# Patient Record
Sex: MALE | Race: WHITE | NOT HISPANIC OR LATINO | Employment: OTHER | URBAN - METROPOLITAN AREA
[De-identification: names, ages, dates, MRNs, and addresses within clinical notes are randomized per-mention and may not be internally consistent; named-entity substitution may affect disease eponyms.]

---

## 2017-01-02 ENCOUNTER — HOSPITAL ENCOUNTER (OUTPATIENT)
Dept: CARDIAC REHAB | Facility: CLINIC | Age: 67
End: 2017-01-02
Attending: INTERNAL MEDICINE
Payer: MEDICARE

## 2017-01-02 PROCEDURE — 93798 PHYS/QHP OP CAR RHAB W/ECG: CPT

## 2017-01-02 PROCEDURE — 40000116 ZZH STATISTIC OP CR VISIT

## 2017-01-04 ENCOUNTER — HOSPITAL ENCOUNTER (OUTPATIENT)
Dept: CARDIAC REHAB | Facility: CLINIC | Age: 67
End: 2017-01-04
Attending: INTERNAL MEDICINE
Payer: MEDICARE

## 2017-01-04 PROCEDURE — 40000116 ZZH STATISTIC OP CR VISIT

## 2017-01-04 PROCEDURE — 93798 PHYS/QHP OP CAR RHAB W/ECG: CPT

## 2017-01-05 ENCOUNTER — OFFICE VISIT (OUTPATIENT)
Dept: FAMILY MEDICINE | Facility: CLINIC | Age: 67
End: 2017-01-05
Payer: MEDICARE

## 2017-01-05 VITALS
RESPIRATION RATE: 22 BRPM | HEIGHT: 68 IN | HEART RATE: 74 BPM | BODY MASS INDEX: 33.04 KG/M2 | OXYGEN SATURATION: 94 % | TEMPERATURE: 95.1 F | DIASTOLIC BLOOD PRESSURE: 88 MMHG | WEIGHT: 218 LBS | SYSTOLIC BLOOD PRESSURE: 128 MMHG

## 2017-01-05 DIAGNOSIS — Z95.5 STATUS POST INSERTION OF DRUG-ELUTING STENT INTO LEFT ANTERIOR DESCENDING ARTERY FOR CORONARY ARTERY DISEASE: Primary | ICD-10-CM

## 2017-01-05 DIAGNOSIS — I25.110 ATHEROSCLEROSIS OF NATIVE CORONARY ARTERY OF NATIVE HEART WITH UNSTABLE ANGINA PECTORIS (H): ICD-10-CM

## 2017-01-05 DIAGNOSIS — M12.9 ARTHROPATHY: ICD-10-CM

## 2017-01-05 DIAGNOSIS — F41.9 ANXIETY: ICD-10-CM

## 2017-01-05 PROCEDURE — 99495 TRANSJ CARE MGMT MOD F2F 14D: CPT | Performed by: FAMILY MEDICINE

## 2017-01-05 RX ORDER — NITROGLYCERIN 0.4 MG/1
TABLET SUBLINGUAL
Qty: 25 TABLET | Refills: 0 | Status: SHIPPED | OUTPATIENT
Start: 2017-01-05 | End: 2020-09-30

## 2017-01-05 RX ORDER — ALPRAZOLAM 0.5 MG
0.5 TABLET ORAL 3 TIMES DAILY PRN
Qty: 90 TABLET | Refills: 1 | Status: SHIPPED | OUTPATIENT
Start: 2017-01-05 | End: 2017-03-03

## 2017-01-05 RX ORDER — HYDROCODONE BITARTRATE AND ACETAMINOPHEN 7.5; 325 MG/1; MG/1
1 TABLET ORAL
Qty: 180 TABLET | Refills: 0 | Status: SHIPPED | OUTPATIENT
Start: 2017-01-05 | End: 2017-02-03

## 2017-01-05 RX ORDER — CITALOPRAM HYDROBROMIDE 40 MG/1
40 TABLET ORAL DAILY
Qty: 30 TABLET | Refills: 6 | Status: SHIPPED | OUTPATIENT
Start: 2017-01-05 | End: 2017-08-04

## 2017-01-05 NOTE — PROGRESS NOTES
SUBJECTIVE:                                                    Quan Murphy is a 66 year old male who presents to clinic today for the following health issues:          Hospital Follow-up Visit:    Hospital/Nursing Home/IP Rehab Facility: [facility]  Date of Admission: 12/24/17  Date of Discharge: 12/28/17  Reason(s) for Admission: Unstable angina, Essential hypertension, Chronic diastolic CHF, Chronic pain syndrome, Major recurrent depressive disorder with anxiety            Problems taking medications regularly:  None       Medication changes since discharge: Plavix       Problems adhering to non-medication therapy:  None    Summary of hospitalization:  [facility] discharge summary reviewed  Diagnostic Tests/Treatments reviewed.  Follow up needed: Cardiac rehabilitation and cardiology.  Other Healthcare Providers Involved in Patient s Care:         Specialist appointment - Cardiology  Update since discharge: improved.     Post Discharge Medication Reconciliation: discharge medications reconciled and changed, per note/orders (see AVS).  Plan of care communicated with patient     Coding guidelines for this visit:  Type of Medical   Decision Making Face-to-Face Visit       within 7 Days of discharge Face-to-Face Visit        within 14 days of discharge   Moderate Complexity [id number]   High Complexity [id number]                Problem list and histories reviewed & adjusted, as indicated.  Additional history: as documented    SUBJECTIVE:  Quan  is a 66 year old male who presents for:  Follow-up of his hospitalization for coronary artery stenting. And was found to have unstable angina. Significant occlusion of the left anterior descending artery and a drug-eluting stent was placed in. He has been feeling well. Slight ecchymosis at the site of the catheterization but otherwise well. He has multiple other medical issues including chronic pain chronic sinusitis and anxiety and insomnia.  These all need addressing today as well.    Past Medical History   Diagnosis Date     Allergy, unspecified not elsewhere classified      Seasonal allergies, pollen, dust, smoke and animals     Malignant neoplasm of prostate (H)      Prostate cancer     Coronary atherosclerosis of unspecified type of vessel, native or graft      Coronary artery disease     Past Surgical History   Procedure Laterality Date     C laparoscopy, surgical prostatectomy, retropubic radical, w/nerve sparing  11/30/2004     With full bilateral pelvic lymphadenectomy.  F-[facility].     Hc correct bunion,simple  08/11/2005     x3     Hc remv toe benign bone lesn  08/11/2005     Surgical history of -   1999/1974     lt knee     Surgical history of -   10/2004     lithotripsy     Surgical history of - 11/05     angiogram with stent     C total knee arthroplasty  05/01/08     Left knee     Mohs micrographic procedure  08/23/11     ear and chin-[facility]     Herniorrhaphy inguinal  7/3/2013     Procedure: HERNIORRHAPHY INGUINAL;  Open Repair Inguinal hernia Right with mesh ;  Surgeon: Sam Escobar MD;  Location: [facility]     Arthrodesis foot  7/23/2013     Procedure: ARTHRODESIS FOOT;  Great Toe Arthrodesis Left Foot;  Surgeon: Ash Gonzalez DPM;  Location: [facility] OR     Colonoscopy  10/7/2013     Procedure: COLONOSCOPY;  Colonoscopy;  Surgeon: Mike Fallon MD;  Location: [facility] GI     Arthrodesis foot  6/10/2014     Procedure: ARTHRODESIS FOOT;  Surgeon: Ash Gonzalez DPM;  Location: [facility] OR     Reconstruct forefoot with metatarsophalangeal (mtp) fusion  6/10/2014     Procedure: RECONSTRUCT FOREFOOT WITH METATARSOPHALANGEAL (MTP) FUSION;  Surgeon: Ash Gonzalez DPM;  Location: [facility] OR     Stent, coronary, cesilia       Social History   Substance Use Topics     Smoking status: Never Smoker      Smokeless tobacco: Never Used     Alcohol Use: 5.0 oz/week     10 Cans of beer per week      Comment: occasional      Current Outpatient  Prescriptions   Medication Sig Dispense Refill     nitroglycerin (NITROSTAT) 0.4 MG sublingual tablet For chest pain place 1 tablet under the tongue every 5 minutes for 3 doses. If symptoms persist 5 minutes after 1st dose call 911. 25 tablet 0     HYDROcodone-acetaminophen (NORCO) 7.5-325 MG per tablet Take 1 tablet by mouth 6 times daily 180 tablet 0     ALPRAZolam (XANAX) 0.5 MG tablet Take 1 tablet (0.5 mg) by mouth 3 times daily as needed for anxiety 90 tablet 1     citalopram (CELEXA) 40 MG tablet Take 1 tablet (40 mg) by mouth daily 30 tablet 6     aspirin EC 81 MG EC tablet Take 1 tablet (81 mg) by mouth daily 30 tablet 1     rosuvastatin (CRESTOR) 40 MG tablet Take 1 tablet (40 mg) by mouth daily 30 tablet 1     clopidogrel (PLAVIX) 75 MG tablet Take 1 tablet (75 mg) by mouth daily 30 tablet 1     zolpidem (AMBIEN) 10 MG tablet Take 1 tablet (10 mg) by mouth nightly as needed for sleep 30 tablet 5     lisinopril (PRINIVIL,ZESTRIL) 10 MG tablet TAKE ONE TABLET BY MOUTH EVERY DAY 30 tablet 6     metoprolol (LOPRESSOR) 25 MG tablet Take 1 tablet (25 mg) by mouth 2 times daily 180 tablet 3     hydrOXYzine (VISTARIL) 50 MG capsule Take 1 capsule (50 mg) by mouth 4 times daily as needed for itching 30 capsule 3     cetirizine (ZYRTEC) 10 MG tablet Take 10 mg by mouth daily       fluorouracil (EFUDEX) 5 % cream Apply topically daily as needed        CIALIS 20 MG tablet Take 20 mg by mouth daily as needed        tretinoin (RETIN-A) 0.1 % cream Apply topically daily as needed        nitroglycerin (NITROSTAT) 0.4 MG SL tablet Place 1 tablet under the tongue every 5 minutes as needed for chest pain. 25 tablet 0     Acetaminophen (TYLENOL PO) Take 500 mg by mouth daily as needed        ORDER FOR DME Nebulizer machine 1 Units 0       REVIEW OF SYSTEMS:   5 point ROS negative except as noted above in HPI, including Gen., Resp, CV, GI &  system review.     OBJECTIVE:  Vitals: /88 mmHg  Pulse 74  Temp(Src) 95.1  " F (35.1  C) (Temporal)  Resp 22  Ht 5' 8\" (1.727 m)  Wt 218 lb (98.884 kg)  BMI 33.15 kg/m2  SpO2 94%  BMI= Body mass index is 33.15 kg/(m^2).  Appears in no distress. Ears nose and throat are clear. Lungs are clear to auscultation. Heart regular rhythm S1-S2 no murmur. Skin clear. Extremities normal.    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 He is doing well on his Plavix no bruising no problems. He was put on Crestor and is doing well with this. Renewed his Vicodin and his Xanax and citalopram. Also refreshed his sublingual nitroglycerin tablets. Follow up with him as needed.        Jose Hernandez MD  [facility]                    "

## 2017-01-05 NOTE — NURSING NOTE
"Chief Complaint   Patient presents with     Hospital F/U       Initial /88 mmHg  Pulse 74  Temp(Src) 95.1  F (35.1  C) (Temporal)  Resp 22  Ht 5' 8\" (1.727 m)  Wt 218 lb (98.884 kg)  BMI 33.15 kg/m2  SpO2 94% Estimated body mass index is 33.15 kg/(m^2) as calculated from the following:    Height as of this encounter: 5' 8\" (1.727 m).    Weight as of this encounter: 218 lb (98.884 kg).  BP completed using cuff size: regular    "

## 2017-01-06 ENCOUNTER — HOSPITAL ENCOUNTER (OUTPATIENT)
Dept: CARDIAC REHAB | Facility: CLINIC | Age: 67
End: 2017-01-06
Attending: INTERNAL MEDICINE
Payer: MEDICARE

## 2017-01-06 PROCEDURE — 40000116 ZZH STATISTIC OP CR VISIT

## 2017-01-06 PROCEDURE — 93798 PHYS/QHP OP CAR RHAB W/ECG: CPT

## 2017-01-09 ENCOUNTER — HOSPITAL ENCOUNTER (OUTPATIENT)
Dept: CARDIAC REHAB | Facility: CLINIC | Age: 67
End: 2017-01-09
Attending: INTERNAL MEDICINE
Payer: MEDICARE

## 2017-01-09 PROCEDURE — 93798 PHYS/QHP OP CAR RHAB W/ECG: CPT | Performed by: REHABILITATION PRACTITIONER

## 2017-01-09 PROCEDURE — 40000116 ZZH STATISTIC OP CR VISIT: Performed by: REHABILITATION PRACTITIONER

## 2017-01-10 ENCOUNTER — OFFICE VISIT (OUTPATIENT)
Dept: CARDIOLOGY | Facility: CLINIC | Age: 67
End: 2017-01-10
Payer: MEDICARE

## 2017-01-10 VITALS
DIASTOLIC BLOOD PRESSURE: 72 MMHG | OXYGEN SATURATION: 97 % | HEIGHT: 68 IN | SYSTOLIC BLOOD PRESSURE: 116 MMHG | RESPIRATION RATE: 12 BRPM | WEIGHT: 220.63 LBS | HEART RATE: 60 BPM | BODY MASS INDEX: 33.44 KG/M2

## 2017-01-10 DIAGNOSIS — I25.10 ATHEROSCLEROSIS OF NATIVE CORONARY ARTERY OF NATIVE HEART WITHOUT ANGINA PECTORIS: ICD-10-CM

## 2017-01-10 DIAGNOSIS — I10 HYPERTENSION GOAL BP (BLOOD PRESSURE) < 140/90: ICD-10-CM

## 2017-01-10 DIAGNOSIS — I25.750 CORONARY ARTERY DISEASE INVOLVING NATIVE ARTERY OF TRANSPLANTED HEART WITH UNSTABLE ANGINA PECTORIS (H): Primary | ICD-10-CM

## 2017-01-10 DIAGNOSIS — E78.5 HYPERLIPIDEMIA LDL GOAL <130: ICD-10-CM

## 2017-01-10 PROCEDURE — 99213 OFFICE O/P EST LOW 20 MIN: CPT | Performed by: NURSE PRACTITIONER

## 2017-01-10 RX ORDER — ROSUVASTATIN CALCIUM 40 MG/1
40 TABLET, COATED ORAL DAILY
Qty: 90 TABLET | Refills: 3 | Status: SHIPPED | OUTPATIENT
Start: 2017-01-10 | End: 2018-02-09

## 2017-01-10 RX ORDER — CLOPIDOGREL BISULFATE 75 MG/1
75 TABLET ORAL DAILY
Qty: 90 TABLET | Refills: 3 | Status: ON HOLD | OUTPATIENT
Start: 2017-01-10 | End: 2017-10-29

## 2017-01-10 ASSESSMENT — PAIN SCALES - GENERAL: PAINLEVEL: NO PAIN (0)

## 2017-01-10 NOTE — PATIENT INSTRUCTIONS
Follow up with a cardiologist in 3 months    If you have questions or concerns please call clinic at [phone].    Please call [phone] for scheduling.      It was a pleasure seeing you today!     Reminder: Please bring in all current medications, over the counter supplements and vitamin bottles to your next appointment.

## 2017-01-10 NOTE — PROGRESS NOTES
Cardiology Clinic Progress Note  Quan Murphy MRN# [id number]   YOB: 1950 Age: 66 year old     Reason for visit: Hospital follow up post LAD stenting           Assessment and Plan:     1. Unstable angina/Coronary artery disease:     s/p PCI to proximal LAD with MARGA    Continue Metoprolol, Lisinopril, and rosuvastatin    DAPT with ASA and Plavix for 1 year uninterrupted    Follow up with cardiologist in 3 months    2. Hypertension    Stable on Metoprolol, Lisinopril    3. Hyperlipidemia    Last LDL 40 on 12/13/16    Crestor increased to 40 mg daily    Can change to Lipitor 80 mg daily in the furture if Crestor is cost prohibitive         History of Presenting Illness:    Quan Murphy is a very pleasant 66 year old patient who presents today for follow up post hospital discharge. He has a past medical history of hypertension, hyperlipidemia, anxiety, depression and chronic pain. He was discharged from [facility] on 12/28/6 post an admission for unstable angina.    He presented to the [facility] ED with chest pain, shortness of breath, and diaphoresis. His EKG showed TWI in V3-V6 and negative serial troponins. Due to concern for unstable angina, he was started on a heparin infusion as well as nitroglycerin patch and transferred to [facility]. His echo showed an estimate LVEF of60-65% with Grade I diastolic dysfunction. There was no significant valvular or wall motion abnormalities.     His coronary angiogram revealed the unstable angina was secondary to  A flow limiting stenosis of the proximal LAD by FFR (0.70). He underwent a successful deployment of a 3.0 x 38 mm Synergy drug-eluting stent to the proximal LAD.  His residual nonobstructive disease in the proximal left circumflex the proximal ramus intermedius and the RCA.  He was commenced on dual antiplatelet therapy with aspirin and Plavix.    Today in clinic he states that he has had no recurrence of his anginal symptoms.  He has been  "participating in cardiac rehabilitation without issue.  He is tolerating his current medications including high-intensity dosed Crestor.          This note was completed in part using Dragon voice recognition software. Although reviewed after completion, some word and grammatical errors may occur       Review of Systems:   Review of Systems:  Skin:  Negative     Eyes:  Negative    ENT:  Negative    Respiratory:  Negative    Cardiovascular:  Negative;chest pain;palpitations;edema;lightheadedness;dizziness fatigue;Positive for  Gastroenterology: Negative    Genitourinary:  Negative    Musculoskeletal:  Negative joint pain  Neurologic:  Negative    Psychiatric:  Negative sleep disturbances;anxiety;depression  Heme/Lymph/Imm:  Positive for allergies  Endocrine:  Negative                Physical Exam:     Vitals: /72 mmHg  Pulse 60  Resp 12  Ht 1.727 m (5' 8\")  Wt 100.077 kg (220 lb 10.1 oz)  BMI 33.55 kg/m2  SpO2 97%  Constitutional:  cooperative, alert and oriented, well developed, well nourished, in no acute distress appears anxious      Skin:  warm and dry to the touch, no apparent skin lesions or masses noted        Head:  normocephalic, no masses or lesions        Eyes:  pupils equal and round        ENT:  no pallor or cyanosis, dentition good        Chest:  clear to auscultation;normal symmetry        Cardiac: regular rhythm;normal S1 and S2                  Abdomen:  abdomen soft        Vascular:                                   Groin site intact    Extremities and Back:  no edema        Neurological:  no gross motor deficits;affect appropriate                 Medications:     Current Outpatient Prescriptions   Medication Sig Dispense Refill     rosuvastatin (CRESTOR) 40 MG tablet Take 1 tablet (40 mg) by mouth daily 90 tablet 3     clopidogrel (PLAVIX) 75 MG tablet Take 1 tablet (75 mg) by mouth daily 90 tablet 3     aspirin 81 MG EC tablet Take 1 tablet (81 mg) by mouth daily 90 tablet 3     " nitroglycerin (NITROSTAT) 0.4 MG sublingual tablet For chest pain place 1 tablet under the tongue every 5 minutes for 3 doses. If symptoms persist 5 minutes after 1st dose call 911. 25 tablet 0     HYDROcodone-acetaminophen (NORCO) 7.5-325 MG per tablet Take 1 tablet by mouth 6 times daily 180 tablet 0     ALPRAZolam (XANAX) 0.5 MG tablet Take 1 tablet (0.5 mg) by mouth 3 times daily as needed for anxiety 90 tablet 1     citalopram (CELEXA) 40 MG tablet Take 1 tablet (40 mg) by mouth daily 30 tablet 6     zolpidem (AMBIEN) 10 MG tablet Take 1 tablet (10 mg) by mouth nightly as needed for sleep 30 tablet 5     lisinopril (PRINIVIL,ZESTRIL) 10 MG tablet TAKE ONE TABLET BY MOUTH EVERY DAY 30 tablet 6     metoprolol (LOPRESSOR) 25 MG tablet Take 1 tablet (25 mg) by mouth 2 times daily 180 tablet 3     hydrOXYzine (VISTARIL) 50 MG capsule Take 1 capsule (50 mg) by mouth 4 times daily as needed for itching 30 capsule 3     cetirizine (ZYRTEC) 10 MG tablet Take 10 mg by mouth daily       fluorouracil (EFUDEX) 5 % cream Apply topically daily as needed        CIALIS 20 MG tablet Take 20 mg by mouth daily as needed        tretinoin (RETIN-A) 0.1 % cream Apply topically daily as needed        nitroglycerin (NITROSTAT) 0.4 MG SL tablet Place 1 tablet under the tongue every 5 minutes as needed for chest pain. 25 tablet 0     Acetaminophen (TYLENOL PO) Take 500 mg by mouth daily as needed        ORDER FOR DME Nebulizer machine 1 Units 0     [DISCONTINUED] rosuvastatin (CRESTOR) 40 MG tablet Take 1 tablet (40 mg) by mouth daily 30 tablet 1     [DISCONTINUED] clopidogrel (PLAVIX) 75 MG tablet Take 1 tablet (75 mg) by mouth daily 30 tablet 1           Family History   Problem Relation Age of Onset     Hypertension Father      has had MI      Connective Tissue Disorder Mother      LUPUS     HEART DISEASE Mother      poor valve-needing replacement       Social History     Social History     Marital Status:      Spouse Name: Alessandra      Number of Children: 2     Years of Education: N/A     Occupational History     Not on file.     Social History Main Topics     Smoking status: Never Smoker      Smokeless tobacco: Never Used     Alcohol Use: 5.0 oz/week     10 Cans of beer per week      Comment: occasional      Drug Use: No     Sexual Activity:     Partners: Female     Other Topics Concern     Not on file     Social History Narrative            Past Medical History:     Past Medical History   Diagnosis Date     Allergy, unspecified not elsewhere classified      Seasonal allergies, pollen, dust, smoke and animals     Malignant neoplasm of prostate (H)      Prostate cancer     Coronary atherosclerosis of unspecified type of vessel, native or graft      Coronary artery disease              Past Surgical History:     Past Surgical History   Procedure Laterality Date     C laparoscopy, surgical prostatectomy, retropubic radical, w/nerve sparing  11/30/2004     With full bilateral pelvic lymphadenectomy.  -[facility].     Hc correct bunion,simple  08/11/2005     x3     Hc remv toe benign bone lesn  08/11/2005     Surgical history of -   1999/1974     lt knee     Surgical history of -   10/2004     lithotripsy     Surgical history of -   11/05     angiogram with stent     C total knee arthroplasty  05/01/08     Left knee     Mohs micrographic procedure  08/23/11     ear and chin-[facility]     Herniorrhaphy inguinal  7/3/2013     Procedure: HERNIORRHAPHY INGUINAL;  Open Repair Inguinal hernia Right with mesh ;  Surgeon: Sam Escobar MD;  Location: [facility] OR     Arthrodesis foot  7/23/2013     Procedure: ARTHRODESIS FOOT;  Great Toe Arthrodesis Left Foot;  Surgeon: Ash Gonzalez DPM;  Location: [facility] OR     Colonoscopy  10/7/2013     Procedure: COLONOSCOPY;  Colonoscopy;  Surgeon: Mike Fallon MD;  Location:  GI     Arthrodesis foot  6/10/2014     Procedure: ARTHRODESIS FOOT;  Surgeon: Ash Gonzalez DPM;  Location:  OR      Reconstruct forefoot with metatarsophalangeal (mtp) fusion  6/10/2014     Procedure: RECONSTRUCT FOREFOOT WITH METATARSOPHALANGEAL (MTP) FUSION;  Surgeon: Ash Gonzalez DPM;  Location: [facility] OR     Stent, coronary, cesilia                Allergies:   Animal dander; Dust mites; Pollen extract; and Smoke.       Data:   All laboratory data reviewed:    LAST CHOLESTEROL:  CHOL      129   12/13/2016  HDL       45   12/13/2016  LDL       40   12/25/2016  LDL       40   12/13/2016  TRIG      219   12/13/2016  CHOLHDLRATIO      2.8   11/12/2014    LAST BMP:  NA      138   12/28/2016   POTASSIUM      4.5   12/28/2016  CHLORIDE      104   12/28/2016  LATRELL      8.6   12/28/2016  CO2       27   12/28/2016  BUN       14   12/28/2016  CR     1.15   12/28/2016  GLC       78   12/28/2016    LAST CBC:  WBC      4.3   12/28/2016  RBC     4.74   12/28/2016  HGB     14.2   12/28/2016  HCT     40.9   12/28/2016  MCV       86   12/28/2016  MCH     30.0   12/28/2016  MCHC     34.7   12/28/2016  RDW     13.4   12/28/2016  PLT      127   12/28/2016      DERRICK SWEENEY, APRN, CNP

## 2017-01-10 NOTE — MR AVS SNAPSHOT
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    Please call [phone] for scheduling.      It was a pleasure seeing you today!     Reminder: Please bring in all current medications, over the counter supplements and vitamin bottles to your next appointment.            Follow-ups after your visit        Additional Services     Follow-Up with Cardiologist                 Your next 10 appointments already scheduled     Jan 11, 2017  3:00 PM   Treatment 60 with Crystal Dickerson [facility] ([facility])    [address] [place] [address] Dr [place] [address]   [phone]            Jan 13, 2017  3:00 PM   Treatment 60 with Crystal Dickerson [facility] ([facility])    [address] [place] [address] Dr [place] [address]   [phone]            Jan 16, 2017  3:00 PM   Treatment 60 with Chastity Serrato [facility] ([facility])    [address] [place] [address] Dr [place] [address]   [phone]            Jan 18, 2017  3:00 PM   Treatment 60 with Crystal Dickerson [facility] ([facility])    [address] [place] [address] Dr [place] [address]   [phone]            Jan 20, 2017  3:00 PM    Treatment 60 with Chastity Serrato, JAVI   [facility] ([facility])    [address] [place] [address]   [phone]            Jan 23, 2017  3:00 PM   Treatment 60 with Chela Raphael, EP   [facility] ([facility])    [address] [place] [address]   [phone]            Jan 25, 2017  3:00 PM   Treatment 60 with Crystal Dickerson, [facility] ([facility])    [address] [place] [address]   [phone]            Jan 27, 2017  3:00 PM   Treatment 60 with Crystal Dickerson, [facility] ([facility])    [address] [place] [address]   [phone]              Future tests that were ordered for you today     Open Future Orders        Priority Expected Expires Ordered    Follow-Up with Cardiologist Routine 4/10/2017 1/10/2018 1/10/2017            Who to contact     If you have questions or need follow up information about today's clinic visit or your schedule please contact [facility] directly at [phone].  Normal or non-critical lab and imaging results will be communicated to you by [employer], letter or phone within 4 business days after the clinic has received the results. If you do not hear from us within 7 days, please contact the clinic through [employer] or phone. If you have a critical or abnormal lab result, we will notify you by phone as soon as possible.  Submit refill requests through [employer] or call your pharmacy and they will forward the refill request to us. Please allow 3 business days for your refill to be completed.          Additional Information About Your Visit        [employer] Information     [employer] gives you secure access to your electronic health record. If you see a primary care provider, you can also send messages to your care team and make appointments. If you have  "questions, please call your primary care clinic.  If you do not have a primary care provider, please call [phone] and they will assist you.        Care EveryWhere ID     This is your Care EveryWhere ID. This could be used by other organizations to access your [place] medical records  [id number]        Your Vitals Were     Pulse Respirations Height BMI (Body Mass Index) Pulse Oximetry       60 12 1.727 m (5' 8\") 33.55 kg/m2 97%        Blood Pressure from Last 3 Encounters:   01/10/17 116/72   01/05/17 128/88   12/28/16 125/71    Weight from Last 3 Encounters:   01/10/17 100.077 kg (220 lb 10.1 oz)   01/05/17 98.884 kg (218 lb)   12/30/16 98.431 kg (217 lb)              We Performed the Following     Follow-Up with Cardiac Advanced Practice Provider          Where to get your medicines      These medications were sent to [facility] [address] - [address]  [address] [facility] [address]     Phone:  [phone]    - aspirin 81 MG EC tablet  - clopidogrel 75 MG tablet  - rosuvastatin 40 MG tablet       Primary Care Provider Office Phone # Fax #    Jose Hernandez [phone]       [place] [address] [address] [address]        Thank you!     Thank you for choosing [facility]  for your care. Our goal is always to provide you with excellent care. Hearing back from our patients is one way we can continue to improve our services. Please take a few minutes to complete the written survey that you may receive in the mail after your visit with us. Thank you!             Your Updated Medication List - Protect others around you: Learn how to safely use, store and throw away your medicines at www.disposemymeds.org.          This list is accurate as of: 1/10/17 11:13 AM.  Always use your most recent med list.                   Brand Name Dispense Instructions for use    ALPRAZolam 0.5 MG tablet    XANAX    90 tablet    Take 1 " tablet (0.5 mg) by mouth 3 times daily as needed for anxiety       aspirin 81 MG EC tablet     90 tablet    Take 1 tablet (81 mg) by mouth daily       cetirizine 10 MG tablet    zyrTEC     Take 10 mg by mouth daily       CIALIS 20 MG tablet   Generic drug:  tadalafil      Take 20 mg by mouth daily as needed       citalopram 40 MG tablet    celeXA    30 tablet    Take 1 tablet (40 mg) by mouth daily       clopidogrel 75 MG tablet    PLAVIX    90 tablet    Take 1 tablet (75 mg) by mouth daily       fluorouracil 5 % cream    EFUDEX     Apply topically daily as needed       HYDROcodone-acetaminophen 7.5-325 MG per tablet    NORCO    180 tablet    Take 1 tablet by mouth 6 times daily       hydrOXYzine 50 MG capsule    VISTARIL    30 capsule    Take 1 capsule (50 mg) by mouth 4 times daily as needed for itching       lisinopril 10 MG tablet    PRINIVIL/ZESTRIL    30 tablet    TAKE ONE TABLET BY MOUTH EVERY DAY       metoprolol 25 MG tablet    LOPRESSOR    180 tablet    Take 1 tablet (25 mg) by mouth 2 times daily       * nitroglycerin 0.4 MG sublingual tablet    NITROSTAT    25 tablet    Place 1 tablet under the tongue every 5 minutes as needed for chest pain.       * nitroglycerin 0.4 MG sublingual tablet    NITROSTAT    25 tablet    For chest pain place 1 tablet under the tongue every 5 minutes for 3 doses. If symptoms persist 5 minutes after 1st dose call 911.       order for DME     1 Units    Nebulizer machine       rosuvastatin 40 MG tablet    CRESTOR    90 tablet    Take 1 tablet (40 mg) by mouth daily       tretinoin 0.1 % cream    RETIN-A     Apply topically daily as needed       TYLENOL PO      Take 500 mg by mouth daily as needed       zolpidem 10 MG tablet    AMBIEN    30 tablet    Take 1 tablet (10 mg) by mouth nightly as needed for sleep       * Notice:  This list has 2 medication(s) that are the same as other medications prescribed for you. Read the directions carefully, and ask your doctor or other care  provider to review them with you.

## 2017-01-10 NOTE — Clinical Note
1/10/2017    Jose Hernandez MD  [facility]   [address]   [place] [address]    RE: Quan Murphy       Dear Colleague,    I had the pleasure of seeing Quan Murphy in the [facility].    Cardiology Clinic Progress Note  Quan Murphy MRN# [id number]   YOB: 1950 Age: 66 year old     Reason for visit: Hospital follow up post LAD stenting           Assessment and Plan:     1. Unstable angina/Coronary artery disease:     s/p PCI to proximal LAD with MARGA    Continue Metoprolol, Lisinopril, and rosuvastatin    DAPT with ASA and Plavix for 1 year uninterrupted    Follow up with cardiologist in 3 months    2. Hypertension    Stable on Metoprolol, Lisinopril    3. Hyperlipidemia    Last LDL 40 on 12/13/16    Crestor increased to 40 mg daily    Can change to Lipitor 80 mg daily in the furture if Crestor is cost prohibitive         History of Presenting Illness:    Quan Murphy is a very pleasant 66 year old patient who presents today for follow up post hospital discharge. He has a past medical history of hypertension, hyperlipidemia, anxiety, depression and chronic pain. He was discharged from [facility] on 12/28/6 post an admission for unstable angina.    He presented to the [facility] ED with chest pain, shortness of breath, and diaphoresis. His EKG showed TWI in V3-V6 and negative serial troponins. Due to concern for unstable angina, he was started on a heparin infusion as well as nitroglycerin patch and transferred to [facility]. His echo showed an estimate LVEF of60-65% with Grade I diastolic dysfunction. There was no significant valvular or wall motion abnormalities.     His coronary angiogram revealed the unstable angina was secondary to  A flow limiting stenosis of the proximal LAD by FFR (0.70). He underwent a successful deployment of a 3.0 x 38 mm Synergy drug-eluting stent to the proximal LAD.  His residual nonobstructive  "disease in the proximal left circumflex the proximal ramus intermedius and the RCA.  He was commenced on dual antiplatelet therapy with aspirin and Plavix.    Today in clinic he states that he has had no recurrence of his anginal symptoms.  He has been participating in cardiac rehabilitation without issue.  He is tolerating his current medications including high-intensity dosed Crestor.          This note was completed in part using Dragon voice recognition software. Although reviewed after completion, some word and grammatical errors may occur       Review of Systems:   Review of Systems:  Skin:  Negative     Eyes:  Negative    ENT:  Negative    Respiratory:  Negative    Cardiovascular:  Negative;chest pain;palpitations;edema;lightheadedness;dizziness fatigue;Positive for  Gastroenterology: Negative    Genitourinary:  Negative    Musculoskeletal:  Negative joint pain  Neurologic:  Negative    Psychiatric:  Negative sleep disturbances;anxiety;depression  Heme/Lymph/Imm:  Positive for allergies  Endocrine:  Negative                Physical Exam:     Vitals: /72 mmHg  Pulse 60  Resp 12  Ht 1.727 m (5' 8\")  Wt 100.077 kg (220 lb 10.1 oz)  BMI 33.55 kg/m2  SpO2 97%  Constitutional:  cooperative, alert and oriented, well developed, well nourished, in no acute distress appears anxious      Skin:  warm and dry to the touch, no apparent skin lesions or masses noted        Head:  normocephalic, no masses or lesions        Eyes:  pupils equal and round        ENT:  no pallor or cyanosis, dentition good        Chest:  clear to auscultation;normal symmetry        Cardiac: regular rhythm;normal S1 and S2                  Abdomen:  abdomen soft        Vascular:                                   Groin site intact    Extremities and Back:  no edema        Neurological:  no gross motor deficits;affect appropriate                 Medications:     Current Outpatient Prescriptions   Medication Sig Dispense Refill     " rosuvastatin (CRESTOR) 40 MG tablet Take 1 tablet (40 mg) by mouth daily 90 tablet 3     clopidogrel (PLAVIX) 75 MG tablet Take 1 tablet (75 mg) by mouth daily 90 tablet 3     aspirin 81 MG EC tablet Take 1 tablet (81 mg) by mouth daily 90 tablet 3     nitroglycerin (NITROSTAT) 0.4 MG sublingual tablet For chest pain place 1 tablet under the tongue every 5 minutes for 3 doses. If symptoms persist 5 minutes after 1st dose call 911. 25 tablet 0     HYDROcodone-acetaminophen (NORCO) 7.5-325 MG per tablet Take 1 tablet by mouth 6 times daily 180 tablet 0     ALPRAZolam (XANAX) 0.5 MG tablet Take 1 tablet (0.5 mg) by mouth 3 times daily as needed for anxiety 90 tablet 1     citalopram (CELEXA) 40 MG tablet Take 1 tablet (40 mg) by mouth daily 30 tablet 6     zolpidem (AMBIEN) 10 MG tablet Take 1 tablet (10 mg) by mouth nightly as needed for sleep 30 tablet 5     lisinopril (PRINIVIL,ZESTRIL) 10 MG tablet TAKE ONE TABLET BY MOUTH EVERY DAY 30 tablet 6     metoprolol (LOPRESSOR) 25 MG tablet Take 1 tablet (25 mg) by mouth 2 times daily 180 tablet 3     hydrOXYzine (VISTARIL) 50 MG capsule Take 1 capsule (50 mg) by mouth 4 times daily as needed for itching 30 capsule 3     cetirizine (ZYRTEC) 10 MG tablet Take 10 mg by mouth daily       fluorouracil (EFUDEX) 5 % cream Apply topically daily as needed        CIALIS 20 MG tablet Take 20 mg by mouth daily as needed        tretinoin (RETIN-A) 0.1 % cream Apply topically daily as needed        nitroglycerin (NITROSTAT) 0.4 MG SL tablet Place 1 tablet under the tongue every 5 minutes as needed for chest pain. 25 tablet 0     Acetaminophen (TYLENOL PO) Take 500 mg by mouth daily as needed        ORDER FOR DME Nebulizer machine 1 Units 0     [DISCONTINUED] rosuvastatin (CRESTOR) 40 MG tablet Take 1 tablet (40 mg) by mouth daily 30 tablet 1     [DISCONTINUED] clopidogrel (PLAVIX) 75 MG tablet Take 1 tablet (75 mg) by mouth daily 30 tablet 1           Family History   Problem Relation  Age of Onset     Hypertension Father      has had MI      Connective Tissue Disorder Mother      LUPUS     HEART DISEASE Mother      poor valve-needing replacement       Social History     Social History     Marital Status:      Spouse Name: Alessandra     Number of Children: 2     Years of Education: N/A     Occupational History     Not on file.     Social History Main Topics     Smoking status: Never Smoker      Smokeless tobacco: Never Used     Alcohol Use: 5.0 oz/week     10 Cans of beer per week      Comment: occasional      Drug Use: No     Sexual Activity:     Partners: Female     Other Topics Concern     Not on file     Social History Narrative            Past Medical History:     Past Medical History   Diagnosis Date     Allergy, unspecified not elsewhere classified      Seasonal allergies, pollen, dust, smoke and animals     Malignant neoplasm of prostate (H)      Prostate cancer     Coronary atherosclerosis of unspecified type of vessel, native or graft      Coronary artery disease              Past Surgical History:     Past Surgical History   Procedure Laterality Date     C laparoscopy, surgical prostatectomy, retropubic radical, w/nerve sparing  11/30/2004     With full bilateral pelvic lymphadenectomy.  -[facility].     Hc correct bunion,simple  08/11/2005     x3     Hc remv toe benign bone lesn  08/11/2005     Surgical history of -   1999/1974     lt knee     Surgical history of -   10/2004     lithotripsy     Surgical history of -   11/05     angiogram with stent     C total knee arthroplasty  05/01/08     Left knee     Mohs micrographic procedure  08/23/11     ear and chin-[facility]     Herniorrhaphy inguinal  7/3/2013     Procedure: HERNIORRHAPHY INGUINAL;  Open Repair Inguinal hernia Right with mesh ;  Surgeon: Sam Escobar MD;  Location: [facility] OR     Arthrodesis foot  7/23/2013     Procedure: ARTHRODESIS FOOT;  Great Toe Arthrodesis Left Foot;  Surgeon: Ash Gonzalez DPM;   Location: [facility] OR     Colonoscopy  10/7/2013     Procedure: COLONOSCOPY;  Colonoscopy;  Surgeon: Mike Fallon MD;  Location: [facility] GI     Arthrodesis foot  6/10/2014     Procedure: ARTHRODESIS FOOT;  Surgeon: Ash Gonzalez DPM;  Location: [facility] OR     Reconstruct forefoot with metatarsophalangeal (mtp) fusion  6/10/2014     Procedure: RECONSTRUCT FOREFOOT WITH METATARSOPHALANGEAL (MTP) FUSION;  Surgeon: Ash Gonzalez DPM;  Location: [facility] OR     Stent, coronary, cesilia                Allergies:   Animal dander; Dust mites; Pollen extract; and Smoke.       Data:   All laboratory data reviewed:    LAST CHOLESTEROL:  CHOL      129   12/13/2016  HDL       45   12/13/2016  LDL       40   12/25/2016  LDL       40   12/13/2016  TRIG      219   12/13/2016  CHOLHDLRATIO      2.8   11/12/2014    LAST BMP:  NA      138   12/28/2016   POTASSIUM      4.5   12/28/2016  CHLORIDE      104   12/28/2016  LATRELL      8.6   12/28/2016  CO2       27   12/28/2016  BUN       14   12/28/2016  CR     1.15   12/28/2016  GLC       78   12/28/2016    LAST CBC:  WBC      4.3   12/28/2016  RBC     4.74   12/28/2016  HGB     14.2   12/28/2016  HCT     40.9   12/28/2016  MCV       86   12/28/2016  MCH     30.0   12/28/2016  MCHC     34.7   12/28/2016  RDW     13.4   12/28/2016  PLT      127   12/28/2016    Thank you for allowing me to participate in the care of your patient.    Sincerely,     MARCY MAZA [facility]

## 2017-01-11 ENCOUNTER — HOSPITAL ENCOUNTER (OUTPATIENT)
Dept: CARDIAC REHAB | Facility: CLINIC | Age: 67
End: 2017-01-11
Attending: INTERNAL MEDICINE
Payer: MEDICARE

## 2017-01-11 PROCEDURE — 93798 PHYS/QHP OP CAR RHAB W/ECG: CPT

## 2017-01-11 PROCEDURE — 40000116 ZZH STATISTIC OP CR VISIT

## 2017-01-13 ENCOUNTER — HOSPITAL ENCOUNTER (OUTPATIENT)
Dept: CARDIAC REHAB | Facility: CLINIC | Age: 67
End: 2017-01-13
Attending: INTERNAL MEDICINE
Payer: MEDICARE

## 2017-01-13 PROCEDURE — 93798 PHYS/QHP OP CAR RHAB W/ECG: CPT

## 2017-01-13 PROCEDURE — 40000116 ZZH STATISTIC OP CR VISIT

## 2017-01-16 ENCOUNTER — HOSPITAL ENCOUNTER (OUTPATIENT)
Dept: CARDIAC REHAB | Facility: CLINIC | Age: 67
End: 2017-01-16
Attending: INTERNAL MEDICINE
Payer: MEDICARE

## 2017-01-16 PROCEDURE — 93798 PHYS/QHP OP CAR RHAB W/ECG: CPT

## 2017-01-16 PROCEDURE — 40000116 ZZH STATISTIC OP CR VISIT

## 2017-01-18 ENCOUNTER — HOSPITAL ENCOUNTER (OUTPATIENT)
Facility: CLINIC | Age: 67
Setting detail: SPECIMEN
End: 2017-01-18
Payer: MEDICARE

## 2017-01-18 ENCOUNTER — HOSPITAL ENCOUNTER (OUTPATIENT)
Dept: CARDIAC REHAB | Facility: CLINIC | Age: 67
End: 2017-01-18
Attending: INTERNAL MEDICINE
Payer: MEDICARE

## 2017-01-18 PROCEDURE — 40000116 ZZH STATISTIC OP CR VISIT

## 2017-01-18 PROCEDURE — 93798 PHYS/QHP OP CAR RHAB W/ECG: CPT

## 2017-01-18 PROCEDURE — 40000575 ZZH STATISTIC OP CARDIAC VISIT #2

## 2017-01-18 PROCEDURE — 93797 PHYS/QHP OP CAR RHAB WO ECG: CPT | Mod: 59

## 2017-01-20 ENCOUNTER — HOSPITAL ENCOUNTER (OUTPATIENT)
Dept: CARDIAC REHAB | Facility: CLINIC | Age: 67
End: 2017-01-20
Attending: INTERNAL MEDICINE
Payer: MEDICARE

## 2017-01-20 PROCEDURE — 93798 PHYS/QHP OP CAR RHAB W/ECG: CPT

## 2017-01-20 PROCEDURE — 40000116 ZZH STATISTIC OP CR VISIT

## 2017-01-23 ENCOUNTER — HOSPITAL ENCOUNTER (OUTPATIENT)
Dept: CARDIAC REHAB | Facility: CLINIC | Age: 67
End: 2017-01-23
Attending: INTERNAL MEDICINE
Payer: MEDICARE

## 2017-01-23 PROCEDURE — 93798 PHYS/QHP OP CAR RHAB W/ECG: CPT | Performed by: REHABILITATION PRACTITIONER

## 2017-01-23 PROCEDURE — 40000116 ZZH STATISTIC OP CR VISIT: Performed by: REHABILITATION PRACTITIONER

## 2017-01-25 ENCOUNTER — HOSPITAL ENCOUNTER (OUTPATIENT)
Dept: CARDIAC REHAB | Facility: CLINIC | Age: 67
End: 2017-01-25
Attending: INTERNAL MEDICINE
Payer: MEDICARE

## 2017-01-25 VITALS — WEIGHT: 212 LBS | BODY MASS INDEX: 32.13 KG/M2 | HEIGHT: 68 IN

## 2017-01-25 PROCEDURE — 40000116 ZZH STATISTIC OP CR VISIT: Performed by: REHABILITATION PRACTITIONER

## 2017-01-25 PROCEDURE — 93798 PHYS/QHP OP CAR RHAB W/ECG: CPT | Performed by: REHABILITATION PRACTITIONER

## 2017-01-25 ASSESSMENT — 6 MINUTE WALK TEST (6MWT)
MALE CALC: 1647.99
PREDICTED: 1658.04
GENDER SELECTION: MALE
FEMALE CALC: 1426.88
TOTAL DISTANCE WALKED (FT): 1290

## 2017-01-25 NOTE — PROGRESS NOTES
01/25/17 1500   Session   Session 30 Day Individualized Treatment Plan   Certified through this date 02/23/17   Cardiac Rehab Assessment   Cardiac Rehab Assessment Quan has made great progress in cardiac rehab over the past month. He has lost 5 pounds and increased his exercise tolerance from 2.8 METs for 35 minutes to 3.4 METs for 50+ minutes. Patient reports today that he has been experiencing some anxiety about his recent heart diagnosis, we discussed this and he will watch the emotions and heart disease video, we will discuss coping mechanisms afterward. Barriers to progress have been none. He will continue to benefit from skilled services to develop coping strategies for stress/anxiety as well as assisting patient in a risk factor reduction plan to lower risk of future heart events.   I have suggested HIIT exercise training for the patient as his hemodynamic response to exercise has been stable, he is agreeable to this. Staff will work closely with him to extablish a safe high intensity exercise program. HIIT protocol calls for 5-8 minute warm up period, followed by bouts of high intensity exercise (OMNI scale 8-9) for 1-2 minutes; and bouts of low to moderate intensity exercise (OMNI scale 3-5) for 3 minutes. Repeat 4 to 6 times, followed by 5 minute cool down. It is expected that patient stay within the hemodynamic guidelines set forth in the exercise prescription policy. Initial exercise workload increase can be above policy guidelines but any subsequent increases will continue to be within guidelines outlined by policy.      General Information   Treatment Diagnosis Stent   Date of Treatment Diagnosis 12/27/16   Significant Past CV History None   Other Medical History .pmh   Lead up symptoms Pain in left shoulder that went up to the left side   Hospital Location [facility] Discharge Date 12/28/16   Signs and Symptoms Post Hospital Discharge Anxiety   Outpatient Cardiac Rehab Start Date  "12/30/16   Primary Physician Jose Hernandez    Primary Physician Follow Up Scheduled   Surgeon Nicolas Levine MD   Cardiologist Lillian Conway MD   Ejection Fraction 60-65%   Risk Stratification Low   Summary of Cath Report   Summary of Cath Report Available   LAD Left Anterior Descending (LAD): is a large type 3 vessel which gives rise to septal perforates and two small caliber diagonal vessels. There is a diffuse 50% stenosis in the proximal LAD.    LCX 30 % stenosis in the proxmial LCX   Ramus There is a mild, tubular stenosis with 30-40% in the proximasl RI   RCA irregularities with < 20% stenosis.   Cath Report Comments Unstable angina secondary to flow limiting stenosis to the proximal LAD by FFR   Living and Work Status    Living Arrangements and Social Status house;spouse   Support System Live with an adult   Return to Employment Retired   Preventative Medications   CMS recommended medications Influenza vaccination;Pneumonia vaccination;Lipid Lowering;Beta Blocker;Ace inhibitors;Antiplatelets   Falls Screen   Have you fallen two or more times in the past year? No   Have you fallen and had an injury in the past year? No   Referral Initiated to Physical Therapy No   Pain   Patient Currently in Pain No   Physical Assessments   Incisions Not assessed   Edema Not assessed   Right Lung Sounds not assessed   Left Lung Sounds not assessed   Limitations Orthopedic  (toes and articifical left knee)   Individualized Treatment Plan   Monitored Sessions Scheduled 36   Monitored Sessions Attended 11   Oxygen   Supplemental Oxygen needed No   Nutrition Management - Weight Management   Assessment Re-assessment   Age 66   Weight 96.163 kg (212 lb)   Height 1.727 m (5' 7.99\")   BMI (Calculated) 32.31   Goal Weight 81.647 kg (180 lb)   Initial Rate Your Plate Score. Dietary tool to assess eating patterns. Scores range from 24 to 72. The higher the score the healthier the eating pattern. 42   Nutrition Management - Lipids "   Lipids Labs Available   Date 12/13/16   Total Cholesterol 129   Triglycerides 219   HDL 45   LDL 40   Prescribed Lipid Medication Yes   Statin Intensity High Intensity   Nutrition Management - Diabetes   Diabetes No   Nutrition Management Summary   Dietary Recommendations Low Fat;Low Cholesterol;Low Sodium   Stages of Change for Diet Compliance Preparation   Interventions Planned Attend Nutrition Education Class(es)   Patient Goals Goal #1   Goal #1 Description Pt will achieve a a daily 250 calories reduction through decreased food intake or increased exercised output.   Goal #1 Target Date 02/13/17   Goal #1 Progress Towards Goal 1/25 Patient has averaged weight loss of 1 pound per week (equal to 500 kcal/day reduction), he states diet has changed and portions have reduced, he is also more consistant with his exercise.    Nutrition Target Outcome Weight loss .5-1 lb/week (if BMI > 25)   Psychosocial Management   Psychosocial Assessment Re-assessment   Is there history of clinical depression or increased risk of depression? History of clinical depression   Current Level of Stress per Patient Report Moderate    Current Coping Skills Patient Unable to Identify Personal Coping Skills   Initial Patient Health Questionnaire -9 Score (PHQ-9) for depression. 5-9 Minimal symptoms, 10-14 Minor depression, 15-19 Major depression, moderately severe, > 20 Major depression, severe  9   Initial [facility] Survey score.  Quality of Life:   If total score > 25 review individual areas where patient rated a 4 or 5.  Consider patients current medical condition and what role that plays on the score.   Adjust treatment protocol to improve areas of concern.  Consider the following:  PHQ9 score, DASI, and re-assessment within the next 30 days to assist with developing treatments.  20   Stages of Change Preparation   Interventions Planned Patient to verbalize understanding of behavioral assessment results;Patient to verbalize  understanding of negative impact of stress to personal health;Patient to attend stress management class(es);Patient interested in implementing one strategy to reduce current level of stress/anxiety   Interventions In Progress or Completed Patient verbalizes understanding of negative impact of stress to personal health;Patient attended stress management class(es)   Patient Goal Yes   Goal Description Pt will move from preparation to action    Goal Target Date 02/25/17   Progress Towards Goal Pt will identify 3 possitive ways to deal with stress.   Psychosocial Comments Dr. Hernandez is aware of pt's deppression   Other Core Components - Hypertension   History of or Diagnosis of Hypertension Yes   Currently taking Anti-Hypertensives Beta blocker;Ace Inhibitor   Other Core Components - Tobacco   History of Tobacco Use Never   Other Core Components Summary   Interventions Planned None   Activity/Exercise History   Activity/Exercise Assessment Re-assessment   Activity/Exercise Status prior to event? Sedentary   Number of Days Currently participating in Moderate Physical Activity? 3   Number of Days Currently performing  Aerobic Exercise (including rehab)? 0   Number of Minutes per Session Currently of Aerobic Exercise (average)? 0   Current Stage of Change (Physical Activity) Preparation   Current Stage of Change (Aerobic Exercise) Preparation   Patient Goals Goal #1   Goal #1 Description Pt will improve in at least 2 risk factors  while attending cardiac rehab 3x a week.   Goal #1 Target Date 02/13/17   Goal #1 Progress Towards Goal 1/25 Patient has been increasing his aerobic exercise and successful with weight loss. Current cholesterol status is unknown. Patient will be joining a health club to continue aerobic exercise when discharged from cardiac rehab. Will continue to attend cardiac rehab 3x/week and establish HIIT exercise to assist in bringing risk factors to guidelines.    Exercise Assessment   6 Minute Walk  Predicted - Gender Selection Male   6 Minute Walk Predicted (Male) 1647.99   6 Minute Walk Predicted (Female) 1426.88   Initial 6 Minute Walk Distance (Feet) 1290 ft   Resting HR 68 bpm   Exercise HR 94 bpm   Post Exercise HR 77 bpm   Resting BP 94/62 mmHg   Exercise /62 mmHg   Post Exercise /62 mmHg   Effort Rating 4   Current MET Level 3.4   MET Level Goal 5-6+   ECG Rhythm Sinus rhythm;Sinus bradycardia   Ectopy None   Current Symptoms Denies symptoms   Limitations/Restrictions None   Exercise Prescription   Mode Treadmill;Nustep;Ambulation;Weights   Duration/Time 15-30 min   Frequency 3 daysweek   THR (85% of age predicted max HR) 131.75   OMNI Effort Rating (0-10 Scale) 4-6/10   Progression Progress peak intensity by 1/2 MET per week;Progress per high intensity interval training (HIIT) program;Continuous bouts;Total exercise time of 30-45 minutes;Aerobic exercise to OMNI rating of 5-7, and heart rate at or below target   Recommended Home Exercise   Type of Exercise Walking   Frequency (days per week) 2   Duration (minutes per session) 15-30 min   Effort Rating Recommended 4-6/10   30 Day Exercise Plan Pt will start walking his dog    Current Home Exercise   Type of Exercise Walking   Frequency (days per week) 3   Duration (minutes per session) 10   Follow-up/On-going Support   Provider follow-up needed on the following No follow-up needed   Learning Assessment   Learner Patient   Primary Language English   Preferred Learning Style Listening;Reading;Demonstration;Pictures/Video   Barriers to Learning No barriers noted   Patient Education   Education recommended Exercise Principles   Education classes attended Exercise Principles;Medication Overview     Physician cosignature/electronic signature indicates approval of this ITP document. I have established, reviewed and made necessary   changes to the individualized treatment plan and exercise prescription for this patient.      Physician Name  (printed): ________________________   Date: _______  Time: ______    Physician Signature: ___________________________________________

## 2017-01-27 ENCOUNTER — TELEPHONE (OUTPATIENT)
Dept: FAMILY MEDICINE | Facility: CLINIC | Age: 67
End: 2017-01-27

## 2017-01-27 ENCOUNTER — HOSPITAL ENCOUNTER (OUTPATIENT)
Dept: CARDIAC REHAB | Facility: CLINIC | Age: 67
End: 2017-01-27
Attending: INTERNAL MEDICINE
Payer: MEDICARE

## 2017-01-27 PROCEDURE — 40000116 ZZH STATISTIC OP CR VISIT

## 2017-01-27 PROCEDURE — 93798 PHYS/QHP OP CAR RHAB W/ECG: CPT

## 2017-01-27 NOTE — TELEPHONE ENCOUNTER
----- Message from [employer] sent at 1/27/2017  1:41 PM CST -----  Regarding: Appointment scheduled from [facility]  Contact: [phone]  Appointment For: CHUCHO MONTOYA ([id number])  Visit Type: [facility] OFFICE VISIT SHORT (910)    2/2/2017     4:00 PM  20 mins.  Jose Hernandez MD     [facility]    Patient Comments:  Primary Care  Have had very little energy since last hospital stay,   when heart stent was put in.  Blood pressure sometimes   quite low and appetite is quite minimal.  Might this   have to do with change in meds since surgery?  Have   felt more anxious since procedure, often times to the   point of being nauseous.  Have stayed pace with rehab   though.

## 2017-01-27 NOTE — TELEPHONE ENCOUNTER
"Quan Murphy is a 66 year old male who set up appointment with PCP via [facility].  Patient declined triage but reports that since his last surgery he has been feeling \"beat down.\"  Patient reports that he continues with cardiac rehab, this is going well, but reports a decrease in appetite, decrease in energy, and decrease in drive to do some of the things he used to do.  Patient reports that he understand that some of this is normal after surgery but reports that he is just not coming out of it as fast this time.  Patient is requesting a increase to his anxiety and depression medication.      NURSING ASSESSMENT:  Onset/duration:  Since surgery 12/24/2016  Associated symptoms:  Decrease appetite, decrease in energy.   Pain scale (0-10)   Denies pain.   Last exam/Treatment:  01/05/2017  Allergies:   Allergies   Allergen Reactions     Animal Dander      Dust Mites      Pollen Extract      Smoke.      NURSING PLAN: Routed to provider Yes    RECOMMENDED DISPOSITION:  Patient has appointment scheduled with PCP for 02/02/2017, and is requesting a follow up on his depression and anxiety medications.  Will route to PCP as FYI.   Will comply with recommendation: Yes  If further questions/concerns or if symptoms do not improve, worsen or new symptoms develop, call your PCP or [facility] as soon as possible.    Guideline used:  Telephone Triage Protocols for Nurses, Fifth Edition, Chastity Travis RN      "

## 2017-01-30 ENCOUNTER — HOSPITAL ENCOUNTER (OUTPATIENT)
Dept: CARDIAC REHAB | Facility: CLINIC | Age: 67
End: 2017-01-30
Attending: INTERNAL MEDICINE
Payer: MEDICARE

## 2017-01-30 PROCEDURE — 93798 PHYS/QHP OP CAR RHAB W/ECG: CPT | Performed by: REHABILITATION PRACTITIONER

## 2017-01-30 PROCEDURE — 40000116 ZZH STATISTIC OP CR VISIT: Performed by: REHABILITATION PRACTITIONER

## 2017-01-30 NOTE — ADDENDUM NOTE
Encounter addended by: Chela Raphael EP on: 1/30/2017  9:55 AM<BR>     Documentation filed: Clinical Notes

## 2017-02-01 ENCOUNTER — HOSPITAL ENCOUNTER (OUTPATIENT)
Dept: CARDIAC REHAB | Facility: CLINIC | Age: 67
End: 2017-02-01
Attending: INTERNAL MEDICINE
Payer: MEDICARE

## 2017-02-01 ENCOUNTER — OFFICE VISIT (OUTPATIENT)
Dept: FAMILY MEDICINE | Facility: CLINIC | Age: 67
End: 2017-02-01
Payer: MEDICARE

## 2017-02-01 VITALS
BODY MASS INDEX: 32.09 KG/M2 | HEART RATE: 74 BPM | OXYGEN SATURATION: 99 % | TEMPERATURE: 96 F | WEIGHT: 211 LBS | RESPIRATION RATE: 20 BRPM | DIASTOLIC BLOOD PRESSURE: 60 MMHG | SYSTOLIC BLOOD PRESSURE: 100 MMHG

## 2017-02-01 DIAGNOSIS — Z95.5 STATUS POST INSERTION OF DRUG-ELUTING STENT INTO LEFT ANTERIOR DESCENDING ARTERY FOR CORONARY ARTERY DISEASE: ICD-10-CM

## 2017-02-01 DIAGNOSIS — F41.9 ANXIETY: Primary | ICD-10-CM

## 2017-02-01 DIAGNOSIS — I10 ESSENTIAL HYPERTENSION WITH GOAL BLOOD PRESSURE LESS THAN 140/90: ICD-10-CM

## 2017-02-01 PROCEDURE — 93798 PHYS/QHP OP CAR RHAB W/ECG: CPT

## 2017-02-01 PROCEDURE — 40000116 ZZH STATISTIC OP CR VISIT

## 2017-02-01 PROCEDURE — 93797 PHYS/QHP OP CAR RHAB WO ECG: CPT

## 2017-02-01 PROCEDURE — 40000575 ZZH STATISTIC OP CARDIAC VISIT #2

## 2017-02-01 PROCEDURE — 99214 OFFICE O/P EST MOD 30 MIN: CPT | Performed by: FAMILY MEDICINE

## 2017-02-01 RX ORDER — LISINOPRIL 10 MG/1
10 TABLET ORAL DAILY
Qty: 90 TABLET | Refills: 1 | Status: SHIPPED | OUTPATIENT
Start: 2017-02-01 | End: 2017-04-04

## 2017-02-01 NOTE — NURSING NOTE
"Patient took his last xanax dose this morning and he also took his norco this morning. KB/CMA    Chief Complaint   Patient presents with     Nausea     Dizziness     Light headedness which is sometimes associated with low BP      Sweats     Patient is reporting that he is sweating constantly     Anxiety     Patient would like something stronger for his anxiety because since his heart surgery his has been very anxious about the health of his heart     Medication Request     Xanax and norco. He knows he is asking for them early but reports taking too many especially of the xanax because he is very anxious.       Initial /60 mmHg  Pulse 74  Temp(Src) 96  F (35.6  C) (Temporal)  Resp 20  Wt 211 lb (95.709 kg)  SpO2 99% Estimated body mass index is 32.09 kg/(m^2) as calculated from the following:    Height as of 1/25/17: 5' 7.99\" (1.727 m).    Weight as of this encounter: 211 lb (95.709 kg).  BP completed using cuff size: regular    "

## 2017-02-01 NOTE — PROGRESS NOTES
SUBJECTIVE:                                                    Quan Murphy is a 66 year old male who presents to clinic today for the following health issues:      Chief Complaint   Patient presents with     Nausea     Dizziness     Light headedness which is sometimes associated with low BP      Sweats     Patient is reporting that he is sweating constantly     Anxiety     Patient would like something stronger for his anxiety because since his heart surgery his has been very anxious about the health of his heart     Medication Request     Xanax and norco. He knows he is asking for them early but reports taking too many especially of the xanax because he is very anxious.             Problem list and histories reviewed & adjusted, as indicated.  Additional history: as documented    SUBJECTIVE:  Quan  is a 66 year old male who presents for:  Marked anxiety. He had coronary artery stenting done over the [place] holiday. Unstable angina he did not have any cardiac damage. He has been in cardiac rehabilitation medically doing very well. Psychologically he is a wreck. Recently saw cardiology they were quite pleased He is concerned about his blood pressure. States he is nauseated. Sweats. Worried that his blood pressure is entirely to low. He has been taking more Xanax. He's been taking more Norco. He says the Norco has a calming effect. He also has Ambien he has Celexa 40 mg. He states he's been losing weight because of nausea. Only difference in medications are Plavix and the increased his Crestor to 40 mg a day.    Past Medical History   Diagnosis Date     Allergy, unspecified not elsewhere classified      Seasonal allergies, pollen, dust, smoke and animals     Malignant neoplasm of prostate (H)      Prostate cancer     Coronary atherosclerosis of unspecified type of vessel, native or graft      Coronary artery disease     Past Surgical History   Procedure Laterality Date     C laparoscopy, surgical  prostatectomy, retropubic radical, w/nerve sparing  11/30/2004     With full bilateral pelvic lymphadenectomy.  F-[facility].     Hc correct bunion,simple  08/11/2005     x3     Hc remv toe benign bone lesn  08/11/2005     Surgical history of -   1999/1974     lt knee     Surgical history of -   10/2004     lithotripsy     Surgical history of -   11/05     angiogram with stent     C total knee arthroplasty  05/01/08     Left knee     Mohs micrographic procedure  08/23/11     ear and chin-[facility]     Herniorrhaphy inguinal  7/3/2013     Procedure: HERNIORRHAPHY INGUINAL;  Open Repair Inguinal hernia Right with mesh ;  Surgeon: Sam Escobar MD;  Location: [facility] OR     Arthrodesis foot  7/23/2013     Procedure: ARTHRODESIS FOOT;  Great Toe Arthrodesis Left Foot;  Surgeon: Ash Gonzalez DPM;  Location: [facility] OR     Colonoscopy  10/7/2013     Procedure: COLONOSCOPY;  Colonoscopy;  Surgeon: Mike Fallon MD;  Location: [facility] GI     Arthrodesis foot  6/10/2014     Procedure: ARTHRODESIS FOOT;  Surgeon: Ash Gonzalez DPM;  Location: [facility] OR     Reconstruct forefoot with metatarsophalangeal (mtp) fusion  6/10/2014     Procedure: RECONSTRUCT FOREFOOT WITH METATARSOPHALANGEAL (MTP) FUSION;  Surgeon: Ash Gonzalez DPM;  Location: [facility] OR     Stent, coronary, cesilia       Social History   Substance Use Topics     Smoking status: Never Smoker      Smokeless tobacco: Never Used     Alcohol Use: 5.0 oz/week     10 Cans of beer per week      Comment: occasional      Current Outpatient Prescriptions   Medication Sig Dispense Refill     lisinopril (PRINIVIL/ZESTRIL) 10 MG tablet Take 1 tablet (10 mg) by mouth daily 90 tablet 1     rosuvastatin (CRESTOR) 40 MG tablet Take 1 tablet (40 mg) by mouth daily 90 tablet 3     clopidogrel (PLAVIX) 75 MG tablet Take 1 tablet (75 mg) by mouth daily 90 tablet 3     aspirin 81 MG EC tablet Take 1 tablet (81 mg) by mouth daily 90 tablet 3     nitroglycerin (NITROSTAT) 0.4 MG  sublingual tablet For chest pain place 1 tablet under the tongue every 5 minutes for 3 doses. If symptoms persist 5 minutes after 1st dose call 911. 25 tablet 0     HYDROcodone-acetaminophen (NORCO) 7.5-325 MG per tablet Take 1 tablet by mouth 6 times daily 180 tablet 0     ALPRAZolam (XANAX) 0.5 MG tablet Take 1 tablet (0.5 mg) by mouth 3 times daily as needed for anxiety 90 tablet 1     citalopram (CELEXA) 40 MG tablet Take 1 tablet (40 mg) by mouth daily 30 tablet 6     zolpidem (AMBIEN) 10 MG tablet Take 1 tablet (10 mg) by mouth nightly as needed for sleep 30 tablet 5     metoprolol (LOPRESSOR) 25 MG tablet Take 1 tablet (25 mg) by mouth 2 times daily 180 tablet 3     cetirizine (ZYRTEC) 10 MG tablet Take 10 mg by mouth daily       fluorouracil (EFUDEX) 5 % cream Apply topically daily as needed        CIALIS 20 MG tablet Take 20 mg by mouth daily as needed        tretinoin (RETIN-A) 0.1 % cream Apply topically daily as needed        [DISCONTINUED] lisinopril (PRINIVIL,ZESTRIL) 10 MG tablet TAKE ONE TABLET BY MOUTH EVERY DAY 30 tablet 6     [DISCONTINUED] nitroglycerin (NITROSTAT) 0.4 MG SL tablet Place 1 tablet under the tongue every 5 minutes as needed for chest pain. 25 tablet 0       REVIEW OF SYSTEMS:   5 point ROS negative except as noted above in HPI, including Gen., Resp, CV, GI &  system review.     OBJECTIVE:  Vitals: /60 mmHg  Pulse 74  Temp(Src) 96  F (35.6  C) (Temporal)  Resp 20  Wt 211 lb (95.709 kg)  SpO2 99%  BMI= Body mass index is 32.09 kg/(m^2).  He is very anxious. Ears nose and throat are normal. Lungs are clear. Heart regular rhythm rate in the 70s. Skin clear. Extremities normal.    ASSESSMENT:  #1 anxiety disorder with exacerbation #2 status post drug-eluting stent #3 hypertension #4 hyperlipidemia    PLAN:  I had a discussion with him and not increasing the use of his Norco or Xanax. I told him was concerning to me that he tells me the Norco has a calming effect. He needs  to work getting down on this and getting down the Xanax. Offered counseling he does not want anything to do with this. States he's been there before. We will decrease his lisinopril to 5 mg a day he will cut them in half and see how that is going with his blood pressure. I'm going to contact cardiology and see if lowering his Crestor to 20 mg might be helpful to possibly reduce this nauseous feeling he's had. Really been the only drug change except for the Plavix ,  his total cholesterol and LDL were excellent when he was just taking the 20 Crestor his LDL being in the 50s. We'll follow him up closely here.        Jose Hernandez MD  [facility]

## 2017-02-01 NOTE — Clinical Note
[facility]    02/01/2017    Patient: Quan Murphy  YOB: 1950  Medical Record Number: [id number]                                                                  Controlled Substance Agreement  I understand that my care provider has prescribed controlled substances (narcotics, tranquilizers, and/or stimulants) to help manage my condition(s).  I am taking this medicine to help me function or work.  I know that this is strong medicine.  It could have serious side effects and even cause a dependency on the drug.  If I stop these medicines suddenly, I could have severe withdrawal symptoms.    The risks, benefits, and side effects of these medication(s) were explained to me.  I agree that:  1. I will take part in other treatments as advised by my provider.  This may be psychiatry or counseling, physical therapy, behavioral therapy, group treatment, or a referral to a pain clinic.  I will reduce or stop my medicine when my provider tells me to do so.   2. I will take my medicines as prescribed.  I will not change the dose or schedule unless my provider tells me to.  There will be no refills if I  run out early.   I may be contacted at any time without warning and asked to complete a drug test or pill count.   3. I will keep all my appointments at the clinic.  If I miss appointments or fail to follow instructions, my provider may stop my medicine.  4. I will not ask other providers to prescribe controlled substances. And I will not accept controlled substances from other people. If I need another prescribed controlled substance for a new reason, I will notify my provider within one business day.  5. If I enroll in the [place] Medical Marijuana program, I will tell my provider.  I will also sign an agreement to share my medical records with my provider.  6. I will use one pharmacy to fill all of my controlled substance prescriptions.  If my prescription is mailed to my  pharmacy, it may take 5 to 7 days for my medicine to be ready.  7. I understand that my provider, clinic care team, and pharmacy can track controlled substance prescriptions from other providers through a central database (prescription monitoring program).  8. I will bring in my list of medications (or my medicine bottles) each time I come to the clinic.  REV- 04/2016                                                                                                                                            Page 1 of 2      [facility]    02/01/2017    Patient: Quan Murphy  YOB: 1950  Medical Record Number: [id number]    9. Refills of controlled substances will be made only during office hours.  It is up to me to make sure that I do not run out of my medicines on weekends or holidays.    10. I am responsible for my prescriptions.  If the medicine is lost or stolen, it will not be replaced.   I also agree not to share these medicines with anyone.  11. I agree to not use ANY illegal or recreational drugs.  This includes marijuana, cocaine, bath salts or other drugs.  I agree not to use alcohol unless my provider says I may.  I agree to give urine samples whenever asked.  If I fail to give a urine sample, the provider may stop my medicine.     12. I will tell my nurse or provider right away if I become pregnant or have a new medical problem treated outside of [facility].  13. I understand that this medicine can affect my thinking and judgment.  It may be unsafe for me to drive, use machinery and do dangerous tasks.  I will not do any of these things until I know how the medicine affects me.  If my dose changes, I will wait to see how it affects me.  I will contact my provider if I have concerns about medicine side effects.  I understand that if I do not follow any of the conditions above, my prescriptions or treatment may be stopped.    I agree that my provider, clinic  care team, and pharmacy may work with any city, state or federal law enforcement agency that investigates the misuse, sale, or other diversion of my controlled medicine. I will allow my provider to discuss my care with or share a copy of this agreement with any other treating provider, pharmacy or emergency room where I receive care.  I agree to give up (waive) any right of privacy or confidentiality with respect to these authorizations.   I have read this agreement and have asked questions about anything I did not understand.   ___________________________________    ___________________________  Patient Signature                                                           Date and Time  ___________________________________     ____________________________  Witness                                                                            Date and Time  ___________________________________  Jose Hernandez MD  REV-  04/2016                                                                                                                                                                 Page 2 of 2  Opioid Pain Medicines (also known as Narcotics)  What You Need to Know      What are opioids?   Opioids are pain medicines that must be prescribed by a doctor. Examples are:     morphine (MS Contin, Khushi)    oxycodone (Oxycontin)    oxycodone and acetaminophen (Percocet)    hydrocodone and acetaminophen (Vicodin, Norco)     fentanyl patch (Duragesic)     hydromorphone (Dilaudid)     methadone     What do opioids do well?   Opioids are best for short-term pain after a surgery or injury. They also work well for cancer pain. Unlike other pain medicines, they do not cause liver or kidney failure or ulcers. They may help some people with long-lasting (chronic) pain.     What do opioids NOT do well?   Opioids never get rid of pain entirely, and they do not work well for most patients with chronic pain. Opioids do not reduce  swelling, one of the causes of pain. They also don t work well for nerve pain.     Side effects  Talk to your doctor before you start or decide to keep taking one of these medicines. Side effects include:    Lowers your breathing rate enough that it could cause death    Death due to taking more than the prescribed dose    Serious lifelong opioid use      Dependence is not the same as addiction. Addiction is when people keep using a substance that harms their body, their mind or their relations with others. If you have a history of drug or alcohol abuse, taking opioids can cause a relapse.  Over time, opioids don t work as well. Most people will need higher and higher doses. The higher the dose, the more serious the side effects. We don t know the long-term effects of opioids.   People who have used opioids for a long time have a lower quality of life, worse depression, higher levels of pain and more visits to doctors.  Overdose from prescription drugs is the second leading cause of death in the [place] The risk of overdose rises when opioids are taken with other drugs such as:    Medicines used for anxiety and panic attacks (such as lorazepam, alprazolam, clonazepam    Other sedatives    Alcohol    Illegal drugs such as heroin  Never share your opioids with others. Be sure to store opioids in a secure place, locked if possible.Young children can easily swallow them and overdose.     Are there other ways to manage pain?  Ways to help reduce pain:    Exercise every day.    Treat health problems that may be causing pain.    Treat mental health problems like depression and anxiety.     Worse depression symptoms; Less pleasure in things you usually enjoy    Feeling tired or sluggish    Slower thoughts or cloudy thinking    Being more sensitive to pain over time; Pain is harder to control.    Trouble sleeping or restless sleep    Changes in hormone levels (for example, less testosterone).     Changes in sex drive or ability  to have sex    Long lasting nausea and constipation    Trouble breathing while asleep; This is worse with lung problems like COPD or sleep apnea.    Unsafe driving    Getting sick more often    Itching    Feeling dizzy    Dry mouth    Sweating    Trouble emptying the bladder (peeing). This is worse if you have an enlarged prostate or get urinary tract infections (UTIs).    What else should I know about opioids?  When someone takes opioids for too long or too often, they become dependent. This means that if you stop or reduce the medicine too quickly, you will have withdrawal symptoms.          Practice good sleep habits.  Try to go to bed and get up at the same time every day.    Stop smoking.  Tobacco use can make pain worse.    Do things that you enjoy.    Find a way to work through pain without drugs.  Try deep breathing, meditation, visual imagery and aromatherapy.    Ask your doctor to help you create a plan to manage your pain.

## 2017-02-01 NOTE — MR AVS SNAPSHOT
After Visit Summary   2/1/2017    Quan Murphy    MRN: [id number]           Patient Information     Date Of Birth          1950        Visit Information        Provider Department      2/1/2017 11:20 AM Jose Hernandez MD [facility]        Today's Diagnoses     Status post insertion of drug-eluting stent into left anterior descending artery for coronary artery disease    -  1     Essential hypertension with goal blood pressure less than 140/90         Anxiety           Care Instructions    Decrease Lisinopril to 5 mg a day (1/2  Tablet). Will check on Crestor dose with heart doctors.          Follow-ups after your visit        Your next 10 appointments already scheduled     Feb 01, 2017  2:00 PM   Treatment 60 with Crystal Dickerson [facility] ([facility])    [address] [place] [address] Dr Sophie POWERS [address]   [phone]            Feb 03, 2017  2:00 PM   Treatment 60 with Crystal Dickerson [facility] ([facility])    [address] [place] [address] Dr Sophie POWERS [address]   [phone]            Feb 06, 2017  2:00 PM   Treatment 60 with JAVI Garcia   [facility] ([facility])    [address] [place] [address] Dr Spohie POWERS [address]   [phone]            Feb 08, 2017  2:00 PM   Treatment 60 with Crystal Dickerson [facility] ([facility])    [address] [place] [address] Dr Sophie POWERS [address]   [phone]            Feb 10, 2017  2:00 PM   Treatment 60 with Crystal Dickerson [facility] ([facility])    [address] [place] [address] Dr Sophie POWERS [address]   [phone]            Feb 13, 2017  2:00 PM   Treatment 60 with JAVI Garcia   [facility] ([facility])    [address] [place] [address] Dr Sophie POWERS [address]   [phone]            Feb 15, 2017  2:00 PM    Treatment 60 with Crystal Dickerson [facility] ([facility])    [address] Sophie POWERS [address] [phone]            Feb 17, 2017  2:00 PM   Treatment 60 with Crystal Dickerson [facility] ([facility])    [address] Sophie POWERS [address] [phone]            Feb 20, 2017  2:00 PM   Treatment 60 with JAVI Garcia   [facility] ([facility])    [address] Sophie POWERS [address]   [phone]            Feb 22, 2017  2:00 PM   Treatment 60 with Crystal Dickerson, [facility] ([facility])    [address] Sophie POWERS [id number] [phone]              Who to contact     If you have questions or need follow up information about today's clinic visit or your schedule please contact [facility] directly at [phone].  Normal or non-critical lab and imaging results will be communicated to you by [employer], letter or phone within 4 business days after the clinic has received the results. If you do not hear from us within 7 days, please contact the clinic through [place] or phone. If you have a critical or abnormal lab result, we will notify you by phone as soon as possible.  Submit refill requests through [employer] or call your pharmacy and they will forward the refill request to us. Please allow 3 business days for your refill to be completed.          Additional Information About Your Visit        [employer] Information     [employer] gives you secure access to your electronic health record. If you see a primary care provider, you can also send messages to your care team and make appointments. If you have questions, please call your primary care clinic.  If you do not have a primary care provider, please call [phone] and they will assist you.        Care EveryWhere ID     This is your Care  EveryWhere ID. This could be used by other organizations to access your [place] medical records  [id number]        Your Vitals Were     Pulse Temperature Respirations Pulse Oximetry          74 96  F (35.6  C) (Temporal) 20 99%         Blood Pressure from Last 3 Encounters:   02/01/17 100/60   01/10/17 116/72   01/05/17 128/88    Weight from Last 3 Encounters:   02/01/17 211 lb (95.709 kg)   01/25/17 212 lb (96.163 kg)   01/10/17 220 lb 10.1 oz (100.077 kg)              Today, you had the following     No orders found for display         Today's Medication Changes          These changes are accurate as of: 2/1/17 12:11 PM.  If you have any questions, ask your nurse or doctor.               These medicines have changed or have updated prescriptions.        Dose/Directions    lisinopril 10 MG tablet   Commonly known as:  PRINIVIL/ZESTRIL   This may have changed:  See the new instructions.   Used for:  Essential hypertension with goal blood pressure less than 140/90   Changed by:  Jose Hernandez MD        Dose:  10 mg   Take 1 tablet (10 mg) by mouth daily   Quantity:  90 tablet   Refills:  1            Where to get your medicines      These medications were sent to [facility], [address] [facility] [address] [facility] [address]     Phone:  [phone]    - lisinopril 10 MG tablet             Primary Care Provider Office Phone # Fax #    Jose Hernandez [phone]       [address]   [facility] [place] [address]        Thank you!     Thank you for choosing [facility]  for your care. Our goal is always to provide you with excellent care. Hearing back from our patients is one way we can continue to improve our services. Please take a few minutes to complete the written survey that you may receive in the mail after your visit with us. Thank you!             Your Updated Medication List - Protect others around you:  Learn how to safely use, store and throw away your medicines at www.disposemymeds.org.          This list is accurate as of: 2/1/17 12:11 PM.  Always use your most recent med list.                   Brand Name Dispense Instructions for use    ALPRAZolam 0.5 MG tablet    XANAX    90 tablet    Take 1 tablet (0.5 mg) by mouth 3 times daily as needed for anxiety       aspirin 81 MG EC tablet     90 tablet    Take 1 tablet (81 mg) by mouth daily       cetirizine 10 MG tablet    zyrTEC     Take 10 mg by mouth daily       CIALIS 20 MG tablet   Generic drug:  tadalafil      Take 20 mg by mouth daily as needed       citalopram 40 MG tablet    celeXA    30 tablet    Take 1 tablet (40 mg) by mouth daily       clopidogrel 75 MG tablet    PLAVIX    90 tablet    Take 1 tablet (75 mg) by mouth daily       fluorouracil 5 % cream    EFUDEX     Apply topically daily as needed       HYDROcodone-acetaminophen 7.5-325 MG per tablet    NORCO    180 tablet    Take 1 tablet by mouth 6 times daily       lisinopril 10 MG tablet    PRINIVIL/ZESTRIL    90 tablet    Take 1 tablet (10 mg) by mouth daily       metoprolol 25 MG tablet    LOPRESSOR    180 tablet    Take 1 tablet (25 mg) by mouth 2 times daily       nitroglycerin 0.4 MG sublingual tablet    NITROSTAT    25 tablet    For chest pain place 1 tablet under the tongue every 5 minutes for 3 doses. If symptoms persist 5 minutes after 1st dose call 911.       rosuvastatin 40 MG tablet    CRESTOR    90 tablet    Take 1 tablet (40 mg) by mouth daily       tretinoin 0.1 % cream    RETIN-A     Apply topically daily as needed       zolpidem 10 MG tablet    AMBIEN    30 tablet    Take 1 tablet (10 mg) by mouth nightly as needed for sleep

## 2017-02-02 ENCOUNTER — MYC MEDICAL ADVICE (OUTPATIENT)
Dept: FAMILY MEDICINE | Facility: CLINIC | Age: 67
End: 2017-02-02

## 2017-02-03 ENCOUNTER — MYC REFILL (OUTPATIENT)
Dept: FAMILY MEDICINE | Facility: CLINIC | Age: 67
End: 2017-02-03

## 2017-02-03 ENCOUNTER — HOSPITAL ENCOUNTER (OUTPATIENT)
Dept: CARDIAC REHAB | Facility: CLINIC | Age: 67
End: 2017-02-03
Attending: INTERNAL MEDICINE
Payer: MEDICARE

## 2017-02-03 DIAGNOSIS — M12.9 ARTHROPATHY: ICD-10-CM

## 2017-02-03 PROCEDURE — 93798 PHYS/QHP OP CAR RHAB W/ECG: CPT

## 2017-02-03 PROCEDURE — 40000116 ZZH STATISTIC OP CR VISIT

## 2017-02-03 RX ORDER — HYDROCODONE BITARTRATE AND ACETAMINOPHEN 7.5; 325 MG/1; MG/1
1 TABLET ORAL
Qty: 180 TABLET | Refills: 0 | Status: CANCELLED | OUTPATIENT
Start: 2017-02-03

## 2017-02-03 RX ORDER — HYDROCODONE BITARTRATE AND ACETAMINOPHEN 7.5; 325 MG/1; MG/1
1 TABLET ORAL 4 TIMES DAILY PRN
Qty: 120 TABLET | Refills: 0 | Status: SHIPPED | OUTPATIENT
Start: 2017-02-03 | End: 2017-03-03

## 2017-02-03 NOTE — TELEPHONE ENCOUNTER
Message from [employer]:  Original authorizing provider: Jose Hernandez MD    Quan Murphy would like a refill of the following medications:  HYDROcodone-acetaminophen (NORCO) 7.5-325 MG per tablet [Jose Hernandez MD]    Preferred pharmacy: [address]     Comment:  Not sure exactly what you are asking Rm. Is it just the Norco that you want me to discontinue, or other meds too? As far as the pain medicine goes, I will make this my last request. Had already planned to cut back completely, until this last crisis. Agree it's time. All I'm asking for is some time, especially right now with so much else going on. Down to four a day since I last saw you. Will bring next refill to zero, if approved. Please consider. Thanks for checking on the Crestor

## 2017-02-03 NOTE — TELEPHONE ENCOUNTER
Per Dr. Hernandez he will wean him off of this and he will refill this on Sunday 2/5/17. Patient notified KB/CMA

## 2017-02-06 ENCOUNTER — HOSPITAL ENCOUNTER (OUTPATIENT)
Dept: CARDIAC REHAB | Facility: CLINIC | Age: 67
End: 2017-02-06
Attending: INTERNAL MEDICINE
Payer: MEDICARE

## 2017-02-06 PROCEDURE — 93798 PHYS/QHP OP CAR RHAB W/ECG: CPT | Performed by: REHABILITATION PRACTITIONER

## 2017-02-06 PROCEDURE — 40000116 ZZH STATISTIC OP CR VISIT: Performed by: REHABILITATION PRACTITIONER

## 2017-02-08 ENCOUNTER — HOSPITAL ENCOUNTER (OUTPATIENT)
Dept: CARDIAC REHAB | Facility: CLINIC | Age: 67
End: 2017-02-08
Attending: INTERNAL MEDICINE
Payer: MEDICARE

## 2017-02-08 PROCEDURE — 40000575 ZZH STATISTIC OP CARDIAC VISIT #2

## 2017-02-08 PROCEDURE — 40000116 ZZH STATISTIC OP CR VISIT

## 2017-02-08 PROCEDURE — 93797 PHYS/QHP OP CAR RHAB WO ECG: CPT | Mod: 59

## 2017-02-08 PROCEDURE — 93798 PHYS/QHP OP CAR RHAB W/ECG: CPT

## 2017-02-10 ENCOUNTER — HOSPITAL ENCOUNTER (OUTPATIENT)
Dept: CARDIAC REHAB | Facility: CLINIC | Age: 67
End: 2017-02-10
Attending: INTERNAL MEDICINE
Payer: MEDICARE

## 2017-02-10 PROCEDURE — 40000116 ZZH STATISTIC OP CR VISIT

## 2017-02-10 PROCEDURE — 93798 PHYS/QHP OP CAR RHAB W/ECG: CPT

## 2017-02-13 ENCOUNTER — HOSPITAL ENCOUNTER (OUTPATIENT)
Dept: CARDIAC REHAB | Facility: CLINIC | Age: 67
End: 2017-02-13
Attending: INTERNAL MEDICINE
Payer: MEDICARE

## 2017-02-13 PROCEDURE — 40000116 ZZH STATISTIC OP CR VISIT: Performed by: REHABILITATION PRACTITIONER

## 2017-02-13 PROCEDURE — 93798 PHYS/QHP OP CAR RHAB W/ECG: CPT | Performed by: REHABILITATION PRACTITIONER

## 2017-02-14 ENCOUNTER — MYC REFILL (OUTPATIENT)
Dept: FAMILY MEDICINE | Facility: CLINIC | Age: 67
End: 2017-02-14

## 2017-02-14 DIAGNOSIS — I10 HYPERTENSION GOAL BP (BLOOD PRESSURE) < 140/90: ICD-10-CM

## 2017-02-15 ENCOUNTER — HOSPITAL ENCOUNTER (OUTPATIENT)
Dept: CARDIAC REHAB | Facility: CLINIC | Age: 67
End: 2017-02-15
Attending: INTERNAL MEDICINE
Payer: MEDICARE

## 2017-02-15 PROCEDURE — 40000575 ZZH STATISTIC OP CARDIAC VISIT #2

## 2017-02-15 PROCEDURE — 93798 PHYS/QHP OP CAR RHAB W/ECG: CPT

## 2017-02-15 PROCEDURE — 93797 PHYS/QHP OP CAR RHAB WO ECG: CPT

## 2017-02-15 PROCEDURE — 40000116 ZZH STATISTIC OP CR VISIT

## 2017-02-15 NOTE — TELEPHONE ENCOUNTER
Message from MyChart:  Original authorizing provider: Jose Hernandez MD    Quan Murphy would like a refill of the following medications:  metoprolol (LOPRESSOR) 25 MG tablet [Jose Hernandez MD]    Preferred pharmacy: [facility], [address]    Comment:           Last Written Prescription Date: 02/17/16  Last Fill Quantity: 180, # refills: 3  Last Office Visit with [facility], [facility] or [facility] prescribing provider:  02/01/17   Next 5 appointments (look out 90 days)     Apr 04, 2017  3:00 PM CDT   [facility] with Aramis Ch MD   [facility]    [address] [place] [address] [phone]                   BP Readings from Last 3 Encounters:   02/01/17 100/60   01/10/17 116/72   01/05/17 128/88     No results found for: MICROL  No results found for: MICROALBUMIN  Creatinine   Date Value Ref Range Status   12/28/2016 1.15 0.66 - 1.25 mg/dL Final   ]  GFR Estimate   Date Value Ref Range Status   12/28/2016 64 >60 mL/min/1.7m2 Final     Comment:     Non  GFR Calc   12/26/2016 78 >60 mL/min/1.7m2 Final     Comment:     Non  GFR Calc   12/24/2016 72 >60 mL/min/1.7m2 Final     Comment:     Non  GFR Calc     GFR Estimate If Black   Date Value Ref Range Status   12/28/2016 77 >60 mL/min/1.7m2 Final     Comment:      GFR Calc   12/26/2016 >90   GFR Calc   >60 mL/min/1.7m2 Final   12/24/2016 88 >60 mL/min/1.7m2 Final     Comment:      GFR Calc     Lab Results   Component Value Date    CHOL 129 12/13/2016     Lab Results   Component Value Date    HDL 45 12/13/2016     Lab Results   Component Value Date    LDL 40 12/25/2016    LDL 40 12/13/2016     Lab Results   Component Value Date    TRIG 219 12/13/2016     Lab Results   Component Value Date    CHOLHDLRATIO 2.8 11/12/2014     Lab Results   Component Value Date    AST 28  12/23/2016     Lab Results   Component Value Date    ALT 45 12/23/2016     No results found for: A1C  Potassium   Date Value Ref Range Status   12/28/2016 4.5 3.4 - 5.3 mmol/L Final

## 2017-02-17 ENCOUNTER — HOSPITAL ENCOUNTER (OUTPATIENT)
Dept: CARDIAC REHAB | Facility: CLINIC | Age: 67
End: 2017-02-17
Attending: INTERNAL MEDICINE
Payer: MEDICARE

## 2017-02-17 DIAGNOSIS — I10 HYPERTENSION GOAL BP (BLOOD PRESSURE) < 140/90: ICD-10-CM

## 2017-02-17 PROCEDURE — 40000116 ZZH STATISTIC OP CR VISIT

## 2017-02-17 PROCEDURE — 93798 PHYS/QHP OP CAR RHAB W/ECG: CPT

## 2017-02-17 RX ORDER — METOPROLOL TARTRATE 25 MG/1
25 TABLET, FILM COATED ORAL 2 TIMES DAILY
Qty: 180 TABLET | Refills: 1 | Status: SHIPPED | OUTPATIENT
Start: 2017-02-17 | End: 2017-03-03

## 2017-02-17 NOTE — TELEPHONE ENCOUNTER
Prescription approved per [facility] Refill Protocol.    Olive Travis RN

## 2017-02-17 NOTE — TELEPHONE ENCOUNTER
lopressor      Last Written Prescription Date: 2/17/16  Last Fill Quantity: 180 # refills: 3  Last Office Visit with [facility] or [facility] prescribing provider: 2/1/17  Next 5 appointments (look out 90 days)     Apr 04, 2017  3:00 PM CDT   [facility] Visit with Aramis Ch MD   [facility]    [address] [place] [address] [phone]                   Potassium   Date Value Ref Range Status   12/28/2016 4.5 3.4 - 5.3 mmol/L Final     Creatinine   Date Value Ref Range Status   12/28/2016 1.15 0.66 - 1.25 mg/dL Final     BP Readings from Last 3 Encounters:   02/01/17 100/60   01/10/17 116/72   01/05/17 128/88

## 2017-02-20 ENCOUNTER — HOSPITAL ENCOUNTER (OUTPATIENT)
Dept: CARDIAC REHAB | Facility: CLINIC | Age: 67
End: 2017-02-20
Attending: INTERNAL MEDICINE
Payer: MEDICARE

## 2017-02-20 PROCEDURE — 93797 PHYS/QHP OP CAR RHAB WO ECG: CPT | Mod: 59 | Performed by: REHABILITATION PRACTITIONER

## 2017-02-20 PROCEDURE — 40000116 ZZH STATISTIC OP CR VISIT: Performed by: REHABILITATION PRACTITIONER

## 2017-02-20 PROCEDURE — 93798 PHYS/QHP OP CAR RHAB W/ECG: CPT | Performed by: REHABILITATION PRACTITIONER

## 2017-02-20 PROCEDURE — 40000575 ZZH STATISTIC OP CARDIAC VISIT #2: Performed by: REHABILITATION PRACTITIONER

## 2017-02-21 RX ORDER — METOPROLOL TARTRATE 25 MG/1
TABLET, FILM COATED ORAL
Qty: 180 TABLET | Refills: 2 | Status: SHIPPED | OUTPATIENT
Start: 2017-02-21 | End: 2017-04-04

## 2017-02-21 NOTE — TELEPHONE ENCOUNTER
Prescription approved per [facility] Refill Protocol.    Olive Travis RN

## 2017-02-22 ENCOUNTER — HOSPITAL ENCOUNTER (OUTPATIENT)
Dept: CARDIAC REHAB | Facility: CLINIC | Age: 67
End: 2017-02-22
Attending: INTERNAL MEDICINE
Payer: MEDICARE

## 2017-02-22 VITALS — BODY MASS INDEX: 30.62 KG/M2 | WEIGHT: 202 LBS | HEIGHT: 68 IN

## 2017-02-22 PROCEDURE — 40000116 ZZH STATISTIC OP CR VISIT

## 2017-02-22 PROCEDURE — 93798 PHYS/QHP OP CAR RHAB W/ECG: CPT

## 2017-02-22 ASSESSMENT — 6 MINUTE WALK TEST (6MWT)
FEMALE CALC: 1461.16
MALE CALC: 1674.74
GENDER SELECTION: MALE
PREDICTED: 1684.95
TOTAL DISTANCE WALKED (FT): 1290

## 2017-02-22 NOTE — PROGRESS NOTES
02/22/17 1300   Session   Session 60 Day Individualized Treatment Plan   Certified through this date 03/23/17   Cardiac Rehab Assessment  Quan Murphy  1950     Cardiac Rehab Assessment Quan has made great progress in cardiac rehab over the past month. He has lost 5 pounds and increased his exercise tolerance from 2.8 METs for 35 minutes to 3.4 METs for 50+ minutes. Patient reports today that he has been experiencing some anxiety about his recent heart diagnosis, we discussed this and he was shows the emotions and heart disease video. Barriers to progress have been none. He will continue to benefit from skilled services to develop coping strategies for stress/anxiety as well as assisting patient in a risk factor reduction plan to lower risk of future heart events. I have suggested HIIT exercise training for the patient as his hemodynamic response to exercise has been stable, he is agreeable to this. Staff will work closely with him to extablish a safe high intensity exercise program.  2/22 Quan has been doing well with the HIIT program over the last 30 days and is currently at 5.3 METs.  In addition he as successfully lost 15# and  has been able to identify and implement 3 possitive solution when dealing with stress and anxiety.  From today today until discharge in 2 weeks pt wants to get under 200# and to be able to continue to maintain his current workload for 45 minutes. At this time skilled intervention remains inportant to help Quan be successful in not only his weightloss goal but his aerobic goal.  Staff will continue to monitor pt's vitals to make sure they are within the normal guidliness for exercise.   General Information   Treatment Diagnosis Stent   Date of Treatment Diagnosis 12/27/16   Significant Past CV History None   Comorbidities None   Other Medical History .pmh   Lead up symptoms Pain in left shoulder that went up to the left side   Hospital Location [facility]  "Discharge Date 12/28/16   Signs and Symptoms Post Hospital Discharge Anxiety   Outpatient Cardiac Rehab Start Date 12/30/16   Primary Physician Jose Hernandez    Primary Physician Follow Up Scheduled   Surgeon Nicolas Levine MD   Cardiologist Lillian Conway MD   Ejection Fraction 60-65%   Risk Stratification Low   Summary of Cath Report   Summary of Cath Report Available   LAD Left Anterior Descending (LAD): is a large type 3 vessel which gives rise to septal perforates and two small caliber diagonal vessels. There is a diffuse 50% stenosis in the proximal LAD.    LCX 30 % stenosis in the proxmial LCX   Ramus There is a mild, tubular stenosis with 30-40% in the proximasl RI   RCA irregularities with < 20% stenosis.   Cath Report Comments Unstable angina secondary to flow limiting stenosis to the proximal LAD by FFR   Living and Work Status    Living Arrangements and Social Status house;spouse   Support System Live with an adult   Return to Employment Retired   Preventative Medications   CMS recommended medications Influenza vaccination;Pneumonia vaccination;Lipid Lowering;Beta Blocker;Ace inhibitors;Antiplatelets   Falls Screen   Have you fallen two or more times in the past year? No   Have you fallen and had an injury in the past year? No   Referral Initiated to Physical Therapy No   Pain   Patient Currently in Pain No   Physical Assessments   Incisions Not assessed   Edema Not assessed   Right Lung Sounds not assessed   Left Lung Sounds not assessed   Limitations Orthopedic  (toes and articifical left knee)   Individualized Treatment Plan   Monitored Sessions Scheduled 36   Monitored Sessions Attended 29   Oxygen   Supplemental Oxygen needed No   Nutrition Management - Weight Management   Assessment Re-assessment   Age 66   Weight 91.6 kg (202 lb)   Height 1.727 m (5' 8\")   BMI (Calculated) 30.78   Goal Weight 81.6 kg (180 lb)   Initial Rate Your Plate Score. Dietary tool to assess eating patterns. Scores range " from 24 to 72. The higher the score the healthier the eating pattern. 42   Nutrition Management - Lipids   Lipids Labs Available   Date 12/25/16   LDL 40   Prescribed Lipid Medication Yes   Statin Intensity High Intensity   Nutrition Management - Diabetes   Diabetes No   Nutrition Management Summary   Dietary Recommendations Low Fat;Low Cholesterol;Low Sodium   Stages of Change for Diet Compliance Preparation   Interventions Planned Attend Nutrition Education Class(es)   Patient Goals Goal #1   Goal #1 Description Pt will achieve a a daily 250 calories reduction through decreased food intake or increased exercised output.   Goal #1 Target Date 02/13/17   Goal #1 Date Met 02/15/17   Goal #1 Progress Towards Goal 1/25 Patient has averaged weight loss of 1 pound per week (equal to 500 kcal/day reduction), he states diet has changed and portions have reduced, he is also more consistant with his exercise. 2/22 Pt is down 15# from watching his caloriac intake and increasing his exercise.  Pt would like to be under 200# on discharge.    Nutrition Target Outcome Weight loss .5-1 lb/week (if BMI > 25)   Psychosocial Management   Psychosocial Assessment Re-assessment   Is there history of clinical depression or increased risk of depression? History of clinical depression   Current Level of Stress per Patient Report Moderate    Current Coping Skills Patient Unable to Identify Personal Coping Skills   Initial Patient Health Questionnaire -9 Score (PHQ-9) for depression. 5-9 Minimal symptoms, 10-14 Minor depression, 15-19 Major depression, moderately severe, > 20 Major depression, severe  9   Initial [facility] Survey score.  Quality of Life:   If total score > 25 review individual areas where patient rated a 4 or 5.  Consider patients current medical condition and what role that plays on the score.   Adjust treatment protocol to improve areas of concern.  Consider the following:  PHQ9 score, DASI, and re-assessment within  the next 30 days to assist with developing treatments.  20   Stages of Change Action   Interventions Planned Patient to verbalize understanding of behavioral assessment results;Patient to verbalize understanding of negative impact of stress to personal health;Patient to attend stress management class(es);Patient interested in implementing one strategy to reduce current level of stress/anxiety   Interventions In Progress or Completed Patient verbalizes understanding of negative impact of stress to personal health;Patient attended stress management class(es)   Patient Goal Yes   Goal Description Pt will move from preparation to action    Goal Target Date 02/25/17   Goal Date Met 02/22/17   Progress Towards Goal Pt will identify 3 possitive ways to deal with stress.  2/22 Pt continues to report that his stress is moderate and in addition to medication he uses meditation, biofeed back and walking.   Psychosocial Comments Dr. Hernandez is aware of pt's deppression   Other Core Components - Hypertension   History of or Diagnosis of Hypertension Yes   Currently taking Anti-Hypertensives Beta blocker;Ace Inhibitor   Other Core Components - Tobacco   History of Tobacco Use Never   Other Core Components Summary   Interventions Planned None   Activity/Exercise History   Activity/Exercise Assessment Re-assessment   Activity/Exercise Status prior to event? Sedentary   Number of Days Currently participating in Moderate Physical Activity? 7   Number of Days Currently performing  Aerobic Exercise (including rehab)? 6   Number of Minutes per Session Currently of Aerobic Exercise (average)? 0   Current Stage of Change (Physical Activity) Action   Current Stage of Change (Aerobic Exercise) Action   Patient Goals Goal #1   Goal #1 Description Pt will improve in at least 2 risk factors  while attending cardiac rehab 3x a week.  2/22 Pt walks daily at a less than moderate intensity.  For jose next 2 weeks until discharge pt will focus on  the FITT principle of exercise   Goal #1 Target Date 03/23/17   Goal #1 Progress Towards Goal 1/25 Patient has been increasing his aerobic exercise and successful with weight loss. Current cholesterol status is unknown. Patient will be joining a health club to continue aerobic exercise when discharged from cardiac rehab. Will continue to attend cardiac rehab 3x/week and establish HIIT exercise to assist in bringing risk factors to guidelines.    Exercise Assessment   6 Minute Walk Predicted - Gender Selection Male   6 Minute Walk Predicted (Male) 1674.74   6 Minute Walk Predicted (Female) 1461.16   Initial 6 Minute Walk Distance (Feet) 1290 ft   Resting HR 68 bpm   Exercise HR 99 bpm   Resting BP 92/64   Exercise /72   Effort Rating 6   Current MET Level 5.3   MET Level Goal 5-6+   ECG Rhythm Sinus rhythm;Sinus bradycardia   Ectopy None   Current Symptoms Denies symptoms   Limitations/Restrictions None   Exercise Prescription   Mode Treadmill;Nustep;Ambulation;Weights   Duration/Time 45-60 min   Frequency 3 daysweek   THR (85% of age predicted max HR) 131.75   OMNI Effort Rating (0-10 Scale) 4-6/10   Progression Continuous bouts;Total exercise time of 30-45 minutes;Aerobic exercise to OMNI rating of 5-7, and heart rate at or below target;Progress peak intensity by 1/2 MET per week;Progress per high intensity interval training (HIIT) program   Recommended Home Exercise   Type of Exercise Walking   Frequency (days per week) 3   Duration (minutes per session) 15-30 min   Effort Rating Recommended 4-6/10   30 Day Exercise Plan Pt will start walking his dog    Current Home Exercise   Type of Exercise Walking   Frequency (days per week) 3   Duration (minutes per session) 20-30   Follow-up/On-going Support   Provider follow-up needed on the following No follow-up needed   Learning Assessment   Learner Patient   Primary Language English   Preferred Learning Style Listening;Reading;Demonstration;Pictures/Video    Barriers to Learning No barriers noted   Patient Education   Education recommended Exercise Principles   Education classes attended Blood Pressure;Exercise Principles;Medication Overview;Nutrition   Physician cosignature/electronic signature indicates approval of this ITP document. I have established, reviewed and made necessary changes to the individualized treatment plan and exercise prescription for this patient.

## 2017-02-24 ENCOUNTER — HOSPITAL ENCOUNTER (OUTPATIENT)
Dept: CARDIAC REHAB | Facility: CLINIC | Age: 67
End: 2017-02-24
Attending: INTERNAL MEDICINE
Payer: MEDICARE

## 2017-02-24 PROCEDURE — 40000116 ZZH STATISTIC OP CR VISIT

## 2017-02-24 PROCEDURE — 93798 PHYS/QHP OP CAR RHAB W/ECG: CPT

## 2017-02-27 ENCOUNTER — HOSPITAL ENCOUNTER (OUTPATIENT)
Dept: CARDIAC REHAB | Facility: CLINIC | Age: 67
End: 2017-02-27
Attending: INTERNAL MEDICINE
Payer: MEDICARE

## 2017-02-27 PROCEDURE — 93798 PHYS/QHP OP CAR RHAB W/ECG: CPT | Performed by: REHABILITATION PRACTITIONER

## 2017-02-27 PROCEDURE — 40000116 ZZH STATISTIC OP CR VISIT: Performed by: REHABILITATION PRACTITIONER

## 2017-03-01 ENCOUNTER — HOSPITAL ENCOUNTER (OUTPATIENT)
Dept: CARDIAC REHAB | Facility: CLINIC | Age: 67
End: 2017-03-01
Attending: INTERNAL MEDICINE
Payer: MEDICARE

## 2017-03-01 PROCEDURE — 40000116 ZZH STATISTIC OP CR VISIT

## 2017-03-01 PROCEDURE — 93798 PHYS/QHP OP CAR RHAB W/ECG: CPT

## 2017-03-03 ENCOUNTER — OFFICE VISIT (OUTPATIENT)
Dept: FAMILY MEDICINE | Facility: CLINIC | Age: 67
End: 2017-03-03
Payer: MEDICARE

## 2017-03-03 ENCOUNTER — HOSPITAL ENCOUNTER (OUTPATIENT)
Dept: CARDIAC REHAB | Facility: CLINIC | Age: 67
End: 2017-03-03
Attending: INTERNAL MEDICINE
Payer: MEDICARE

## 2017-03-03 VITALS
DIASTOLIC BLOOD PRESSURE: 70 MMHG | OXYGEN SATURATION: 97 % | BODY MASS INDEX: 30.56 KG/M2 | TEMPERATURE: 95.9 F | HEART RATE: 82 BPM | WEIGHT: 201 LBS | RESPIRATION RATE: 18 BRPM | SYSTOLIC BLOOD PRESSURE: 120 MMHG

## 2017-03-03 DIAGNOSIS — J32.0 CHRONIC MAXILLARY SINUSITIS: Primary | ICD-10-CM

## 2017-03-03 DIAGNOSIS — Z95.5 STATUS POST INSERTION OF DRUG-ELUTING STENT INTO LEFT ANTERIOR DESCENDING ARTERY FOR CORONARY ARTERY DISEASE: ICD-10-CM

## 2017-03-03 DIAGNOSIS — F41.9 ANXIETY: ICD-10-CM

## 2017-03-03 DIAGNOSIS — M12.9 ARTHROPATHY: ICD-10-CM

## 2017-03-03 PROCEDURE — 40000116 ZZH STATISTIC OP CR VISIT

## 2017-03-03 PROCEDURE — 99214 OFFICE O/P EST MOD 30 MIN: CPT | Performed by: FAMILY MEDICINE

## 2017-03-03 PROCEDURE — 93798 PHYS/QHP OP CAR RHAB W/ECG: CPT

## 2017-03-03 RX ORDER — ALPRAZOLAM 0.5 MG
0.5 TABLET ORAL 3 TIMES DAILY PRN
Qty: 90 TABLET | Refills: 1 | Status: SHIPPED | OUTPATIENT
Start: 2017-03-03 | End: 2017-05-05

## 2017-03-03 RX ORDER — HYDROCODONE BITARTRATE AND ACETAMINOPHEN 7.5; 325 MG/1; MG/1
1 TABLET ORAL 4 TIMES DAILY PRN
Qty: 100 TABLET | Refills: 0 | Status: SHIPPED | OUTPATIENT
Start: 2017-03-03 | End: 2017-03-29

## 2017-03-03 NOTE — MR AVS SNAPSHOT
After Visit Summary   3/3/2017    Quan Murphy    MRN: [id number]           Patient Information     Date Of Birth          1950        Visit Information        Provider Department      3/3/2017 1:00 PM Jose Hernandez MD [facility]        Today's Diagnoses     Chronic maxillary sinusitis    -  1    Arthropathy        Anxiety        Status post insertion of drug-eluting stent into left anterior descending artery for coronary artery disease           Follow-ups after your visit        Your next 10 appointments already scheduled     Mar 06, 2017  2:00 PM CST   Treatment 60 with JAVI Garcia   [facility] ([facility])    [address] [place] [address] [address] [place] [address]   [phone]            Mar 08, 2017  2:00 PM CST   Treatment 60 with ZARINA Miller   [facility] ([facility])    [address] [place] [address] [address] [place] [place] [address]   [phone]            Mar 10, 2017  2:00 PM CST   Treatment 60 with Crystal Dickerson [facility] ([facility])    [address] [place] [address] [address] [place] [address]   [phone]            Apr 04, 2017  3:00 PM CDT   [facility] Ch [facility]   [facility]    [address] [place] [id number] [phone]              Who to contact     If you have questions or need follow up information about today's clinic visit or your schedule please contact [facility] directly at [phone].  Normal or non-critical lab and imaging results will be communicated to you by MyChart, letter or phone within 4 business days after the clinic has received the results. If you do not hear from us within 7 days, please contact the clinic through MyChart or phone. If you have a critical or abnormal lab result, we will notify you by phone as soon as possible.  Submit refill  requests through IfOnly or call your pharmacy and they will forward the refill request to us. Please allow 3 business days for your refill to be completed.          Additional Information About Your Visit        G5hart Information     IfOnly gives you secure access to your electronic health record. If you see a primary care provider, you can also send messages to your care team and make appointments. If you have questions, please call your primary care clinic.  If you do not have a primary care provider, please call [phone] and they will assist you.        Care EveryWhere ID     This is your Care EveryWhere ID. This could be used by other organizations to access your [facility] medical records  [id number]        Your Vitals Were     Pulse Temperature Respirations Pulse Oximetry BMI (Body Mass Index)       82 95.9  F (35.5  C) (Temporal) 18 97% 30.56 kg/m2        Blood Pressure from Last 3 Encounters:   03/03/17 120/70   02/01/17 100/60   01/10/17 116/72    Weight from Last 3 Encounters:   03/03/17 201 lb (91.2 kg)   02/22/17 202 lb (91.6 kg)   02/01/17 211 lb (95.7 kg)              Today, you had the following     No orders found for display         Where to get your medicines      Some of these will need a paper prescription and others can be bought over the counter.  Ask your nurse if you have questions.     Bring a paper prescription for each of these medications     ALPRAZolam 0.5 MG tablet    HYDROcodone-acetaminophen 7.5-325 MG per tablet          Primary Care Provider Office Phone # Fax #    Jose Hernandez [phone]       [facility] [address] [place] [address]        Thank you!     Thank you for choosing [facility]  for your care. Our goal is always to provide you with excellent care. Hearing back from our patients is one way we can continue to improve our services. Please take a few minutes to complete the written survey that you may  receive in the mail after your visit with us. Thank you!             Your Updated Medication List - Protect others around you: Learn how to safely use, store and throw away your medicines at www.disposemymeds.org.          This list is accurate as of: 3/3/17 11:59 PM.  Always use your most recent med list.                   Brand Name Dispense Instructions for use    ALPRAZolam 0.5 MG tablet    XANAX    90 tablet    Take 1 tablet (0.5 mg) by mouth 3 times daily as needed for anxiety       aspirin 81 MG EC tablet     90 tablet    Take 1 tablet (81 mg) by mouth daily       cetirizine 10 MG tablet    zyrTEC     Take 10 mg by mouth daily       CIALIS 20 MG tablet   Generic drug:  tadalafil      Take 20 mg by mouth daily as needed       citalopram 40 MG tablet    celeXA    30 tablet    Take 1 tablet (40 mg) by mouth daily       clopidogrel 75 MG tablet    PLAVIX    90 tablet    Take 1 tablet (75 mg) by mouth daily       HYDROcodone-acetaminophen 7.5-325 MG per tablet    NORCO    100 tablet    Take 1 tablet by mouth 4 times daily as needed for moderate to severe pain       lisinopril 10 MG tablet    PRINIVIL/ZESTRIL    90 tablet    Take 1 tablet (10 mg) by mouth daily       metoprolol 25 MG tablet    LOPRESSOR    180 tablet    TAKE ONE TABLET BY MOUTH TWICE A DAY       nitroglycerin 0.4 MG sublingual tablet    NITROSTAT    25 tablet    For chest pain place 1 tablet under the tongue every 5 minutes for 3 doses. If symptoms persist 5 minutes after 1st dose call 911.       rosuvastatin 40 MG tablet    CRESTOR    90 tablet    Take 1 tablet (40 mg) by mouth daily       zolpidem 10 MG tablet    AMBIEN    30 tablet    Take 1 tablet (10 mg) by mouth nightly as needed for sleep

## 2017-03-03 NOTE — NURSING NOTE
"Chief Complaint   Patient presents with     Recheck Medication       Initial /70 (Cuff Size: Adult Regular)  Pulse 82  Temp 95.9  F (35.5  C) (Temporal)  Resp 18  Wt 201 lb (91.2 kg)  SpO2 97%  BMI 30.56 kg/m2 Estimated body mass index is 30.56 kg/(m^2) as calculated from the following:    Height as of 2/22/17: 5' 8\" (1.727 m).    Weight as of this encounter: 201 lb (91.2 kg).  Medication Reconciliation: complete    "

## 2017-03-03 NOTE — PROGRESS NOTES
SUBJECTIVE:                                                    Quan Murphy is a 66 year old male who presents to clinic today for the following health issues:      Medication Followup of pain medications    Taking Medication as prescribed: yes    Side Effects:  None    Medication Helping Symptoms:  yes           Problem list and histories reviewed & adjusted, as indicated.  Additional history: as documented        Reviewed and updated as needed this visit by clinical staff  Tobacco  Allergies  Meds       Reviewed and updated as needed this visit by Provider        SUBJECTIVE:  Quan  is a 66 year old male who presents for:  Discussion of his use of controlled substances narcotic pain medication. Started a gradual decrease in the amount of Vicodin he is using. He was up to 6 a day of 7.5 mg hydrocodone. He is down now to 4 and were going to moving to 3. He is largely taking this because of sinus issues. He states as long as he takes this he has fewer symptoms. He is also using Xanax and Ambien at night. We have tried to get him to use these more on a when necessary basis. He is very anxious and is also on SSRI for anxiety and depression. Worried about what might happen if he discontinues his medications. He has severe nasal congestion and allergies. He has been to allergist. Recently had coronary artery stenting.    Past Medical History   Diagnosis Date     Allergy, unspecified not elsewhere classified      Seasonal allergies, pollen, dust, smoke and animals     Coronary atherosclerosis of unspecified type of vessel, native or graft      Coronary artery disease     Malignant neoplasm of prostate (H)      Prostate cancer     Past Surgical History   Procedure Laterality Date     C laparoscopy, surgical prostatectomy, retropubic radical, w/nerve sparing  11/30/2004     With full bilateral pelvic lymphadenectomy.  [facility].     Hc correct bunion,simple  08/11/2005     x3     Hc remv toe benign bone lesn  08/11/2005      Surgical history of -   1999/1974     lt knee     Surgical history of -   10/2004     lithotripsy     Surgical history of -   11/05     angiogram with stent     C total knee arthroplasty  05/01/08     Left knee     Mohs micrographic procedure  08/23/11     [facility]     Herniorrhaphy inguinal  7/3/2013     Procedure: HERNIORRHAPHY INGUINAL;  Open Repair Inguinal hernia Right with mesh ;  Surgeon: Sam Escobar MD;  Location: [facility] OR     Arthrodesis foot  7/23/2013     Procedure: ARTHRODESIS FOOT;  Great Toe Arthrodesis Left Foot;  Surgeon: Ash Gonzalez DPM;  Location: [facility] OR     Colonoscopy  10/7/2013     Procedure: COLONOSCOPY;  Colonoscopy;  Surgeon: Mike Fallon MD;  Location: [facility] GI     Arthrodesis foot  6/10/2014     Procedure: ARTHRODESIS FOOT;  Surgeon: Ash Gonzalez DPM;  Location: [facility] OR     Reconstruct forefoot with metatarsophalangeal (mtp) fusion  6/10/2014     Procedure: RECONSTRUCT FOREFOOT WITH METATARSOPHALANGEAL (MTP) FUSION;  Surgeon: Ash Gonzalez DPM;  Location: [facility] OR     Stent, coronary, cesilia       Social History   Substance Use Topics     Smoking status: Never Smoker     Smokeless tobacco: Never Used     Alcohol use 5.0 oz/week     10 Cans of beer per week      Comment: occasional      Current Outpatient Prescriptions   Medication Sig Dispense Refill     HYDROcodone-acetaminophen (NORCO) 7.5-325 MG per tablet Take 1 tablet by mouth 4 times daily as needed for moderate to severe pain 100 tablet 0     ALPRAZolam (XANAX) 0.5 MG tablet Take 1 tablet (0.5 mg) by mouth 3 times daily as needed for anxiety 90 tablet 1     metoprolol (LOPRESSOR) 25 MG tablet TAKE ONE TABLET BY MOUTH TWICE A  tablet 2     lisinopril (PRINIVIL/ZESTRIL) 10 MG tablet Take 1 tablet (10 mg) by mouth daily 90 tablet 1     rosuvastatin (CRESTOR) 40 MG tablet Take 1 tablet (40 mg) by mouth daily 90 tablet 3     clopidogrel (PLAVIX) 75 MG tablet Take 1 tablet (75 mg) by  mouth daily 90 tablet 3     aspirin 81 MG EC tablet Take 1 tablet (81 mg) by mouth daily 90 tablet 3     nitroglycerin (NITROSTAT) 0.4 MG sublingual tablet For chest pain place 1 tablet under the tongue every 5 minutes for 3 doses. If symptoms persist 5 minutes after 1st dose call 911. 25 tablet 0     citalopram (CELEXA) 40 MG tablet Take 1 tablet (40 mg) by mouth daily 30 tablet 6     zolpidem (AMBIEN) 10 MG tablet Take 1 tablet (10 mg) by mouth nightly as needed for sleep 30 tablet 5     cetirizine (ZYRTEC) 10 MG tablet Take 10 mg by mouth daily       CIALIS 20 MG tablet Take 20 mg by mouth daily as needed          REVIEW OF SYSTEMS:   5 point ROS negative except as noted above in HPI, including Gen., Resp, CV, GI &  system review.     OBJECTIVE:  Vitals: /70 (Cuff Size: Adult Regular)  Pulse 82  Temp 95.9  F (35.5  C) (Temporal)  Resp 18  Wt 201 lb (91.2 kg)  SpO2 97%  BMI 30.56 kg/m2  BMI= Body mass index is 30.56 kg/(m^2).  He is alert and oriented and appears in no distress. No further exam was done today.    ASSESSMENT:  #1 chronic maxillary sinusitis #2 anxiety #3 poly- arthropathy #4 history of prostate cancer    PLAN:  He has multiple medical problems make him very anxious. He has tried just about everything for his allergies and he states the Vicodin helps the most. I told him this is really not a use for Vicodin and we still need to work it down. He is agreeable to do this. Were trying to go down to 3 a day for on some days. Hopefully 100 pills will last him a month now. We'll try to go down after that. Try to decrease use of the Xanax as well. He seems motivated. Really suggest that he get in to see another allergist. Suggested he could take more Zyrtec a day than just one. He is going to try this. Over 25 minutes was spent on this encounter over half of which was face-to-face with the patient in counseling and discussing different therapeutic options and approaches.    Weight management plan:  Low cholesterol low carb weight loss diet    Jose Hernandez MD  [facility]

## 2017-03-06 ENCOUNTER — HOSPITAL ENCOUNTER (OUTPATIENT)
Dept: CARDIAC REHAB | Facility: CLINIC | Age: 67
End: 2017-03-06
Attending: INTERNAL MEDICINE
Payer: MEDICARE

## 2017-03-06 PROCEDURE — 93798 PHYS/QHP OP CAR RHAB W/ECG: CPT | Performed by: REHABILITATION PRACTITIONER

## 2017-03-06 PROCEDURE — 40000116 ZZH STATISTIC OP CR VISIT: Performed by: REHABILITATION PRACTITIONER

## 2017-03-08 ENCOUNTER — HOSPITAL ENCOUNTER (OUTPATIENT)
Dept: CARDIAC REHAB | Facility: CLINIC | Age: 67
End: 2017-03-08
Attending: INTERNAL MEDICINE
Payer: MEDICARE

## 2017-03-08 PROCEDURE — 93798 PHYS/QHP OP CAR RHAB W/ECG: CPT

## 2017-03-08 PROCEDURE — 40000116 ZZH STATISTIC OP CR VISIT

## 2017-03-10 ENCOUNTER — HOSPITAL ENCOUNTER (OUTPATIENT)
Dept: CARDIAC REHAB | Facility: CLINIC | Age: 67
End: 2017-03-10
Attending: INTERNAL MEDICINE
Payer: MEDICARE

## 2017-03-10 VITALS — HEIGHT: 68 IN | BODY MASS INDEX: 32.89 KG/M2 | WEIGHT: 217 LBS

## 2017-03-10 PROCEDURE — 93798 PHYS/QHP OP CAR RHAB W/ECG: CPT

## 2017-03-10 PROCEDURE — 40000116 ZZH STATISTIC OP CR VISIT

## 2017-03-10 ASSESSMENT — 6 MINUTE WALK TEST (6MWT)
FEMALE CALC: 1409.81
MALE CALC: 1634.87
PREDICTED: 1644.84
TOTAL DISTANCE WALKED (FT): 1650
TOTAL DISTANCE WALKED (FT): 1290
GENDER SELECTION: MALE

## 2017-03-10 NOTE — PROGRESS NOTES
03/10/17 1400   Session   Session Discharge Note   Certified through this date 03/10/17   Cardiac Rehab Assessment  Quan Murphy  1950  66 year old     Cardiac Rehab Assessment Quan has made great progress in cardiac rehab over the past month. He has lost 5 pounds and increased his exercise tolerance from 2.8 METs for 35 minutes to 3.4 METs for 50+ minutes. Patient reports today that he has been experiencing some anxiety about his recent heart diagnosis, we discussed this and he was shows the emotions and heart disease video. Barriers to progress have been none. He will continue to benefit from skilled services to develop coping strategies for stress/anxiety as well as assisting patient in a risk factor reduction plan to lower risk of future heart events. I have suggested HIIT exercise training for the patient as his hemodynamic response to exercise has been stable, he is agreeable to this. Staff will work closely with him to extablish a safe high intensity exercise program.  2/22 Quan has been doing well with the HIIT program over the last 30 days and is currently at 5.3 METs.  In addition he as successfully lost 15# and  has been able to identify and implement 3 possitive solution when dealing with stress and anxiety.  From today today until discharge in 2 weeks pt wants to get under 200# and to be able to continue to maintain his current workload for 45 minutes. At this time skilled intervention remains inportant to help Quan be successful in not only his weightloss goal but his aerobic goal.  Staff will continue to monitor pt's vitals to make sure they are within the normal guidliness for exercise.    You patient was discharged from the cardiac rehab program today because he reached 36 session. During cardiac rehab this patient made significant gains in exercise tolerance. Initially patient tolerated 36 minutes at a peak of 2.8 METs, and is now tolerating 50 minutes at a peak of 5.5 METs. Patient  also increased 6-minute walk test by 28% (an increase of 360 feet). The patient improved his/her weight by 8%, PHQ-9 depression screening by 67%, and Rate your Plate dietary screening by 45%. Please see progress that was made towards goals at the bottom of this treatment plan.  Barriers to progression were none. Today the patient was given instructions on frequency, intensity, and duration for continued home exercise.  Your patient plans to continue walking in addition to going to the local health club 3x a week  for continued aerobic home exercise.   General Information   Treatment Diagnosis Stent   Date of Treatment Diagnosis 12/27/16   Significant Past CV History None   Comorbidities None   Other Medical History Past Medical History   Diagnosis Date     Allergy, unspecified not elsewhere classified      Seasonal allergies, pollen, dust, smoke and animals     Coronary atherosclerosis of unspecified type of vessel, native or graft      Coronary artery disease     Malignant neoplasm of prostate (H)      Prostate cancer      Lead up symptoms Pain in left shoulder that went up to the left side   Hospital Location [facility] Discharge Date 12/28/16   Signs and Symptoms Post Hospital Discharge Anxiety   Outpatient Cardiac Rehab Start Date 12/30/16   Primary Physician Jose Hernandez    Primary Physician Follow Up Scheduled   Surgeon Nicolas Levine MD   Cardiologist Lillian Conway MD   Ejection Fraction 60-65%   Risk Stratification Low   Summary of Cath Report   Summary of Cath Report Available   LAD Left Anterior Descending (LAD): is a large type 3 vessel which gives rise to septal perforates and two small caliber diagonal vessels. There is a diffuse 50% stenosis in the proximal LAD.    LCX 30 % stenosis in the proxmial LCX   Ramus There is a mild, tubular stenosis with 30-40% in the proximasl RI   RCA irregularities with < 20% stenosis.   Cath Report Comments Unstable angina secondary to flow  "limiting stenosis to the proximal LAD by FFR   Living and Work Status    Living Arrangements and Social Status house;spouse   Support System Live with an adult   Return to Employment Retired   Preventative Medications   CMS recommended medications Influenza vaccination;Pneumonia vaccination;Lipid Lowering;Beta Blocker;Ace inhibitors;Antiplatelets   Falls Screen   Have you fallen two or more times in the past year? No   Have you fallen and had an injury in the past year? No   Referral Initiated to Physical Therapy No   Pain   Patient Currently in Pain No   Physical Assessments   Incisions Not assessed   Edema Not assessed   Right Lung Sounds not assessed   Left Lung Sounds not assessed   Limitations Orthopedic  (toes and articifical left knee)   Individualized Treatment Plan   Monitored Sessions Scheduled 36   Monitored Sessions Attended 36   Oxygen   Supplemental Oxygen needed No   Nutrition Management - Weight Management   Assessment Discharge   Age 66   Weight 98.4 kg (217 lb)   Height 1.727 m (5' 7.99\")   BMI (Calculated) 33.07   Goal Weight 81.6 kg (180 lb)   Initial Rate Your Plate Score. Dietary tool to assess eating patterns. Scores range from 24 to 72. The higher the score the healthier the eating pattern. 42   Weight Management Comments 61   Nutrition Management - Lipids   Lipids Labs Available   Date 12/13/16   Total Cholesterol 129   Triglycerides 219   HDL 45   LDL 40   Prescribed Lipid Medication Yes   Statin Intensity High Intensity   Nutrition Management - Diabetes   Diabetes No   Nutrition Management Summary   Dietary Recommendations Low Fat;Low Cholesterol;Low Sodium   Stages of Change for Diet Compliance Preparation   Interventions Planned Attend Nutrition Education Class(es)   Interventions In Progress or Completed Attended Nutrition Class(es)   Patient Goals Goal #1   Goal #1 Description Pt will achieve a a daily 250 calories reduction through decreased food intake or increased exercised output. "   Goal #1 Target Date 02/13/17   Goal #1 Date Met 02/15/17   Goal #1 Progress Towards Goal 1/25 Patient has averaged weight loss of 1 pound per week (equal to 500 kcal/day reduction), he states diet has changed and portions have reduced, he is also more consistant with his exercise. 2/22 Pt is down 15# from watching his caloriac intake and increasing his exercise.  Pt would like to be under 200# on discharge. 3/10 Pt has lost 17# and was under 200 yesterday.   Nutrition Target Outcome Weight loss .5-1 lb/week (if BMI > 25)   Psychosocial Management   Psychosocial Assessment Discharge   Is there history of clinical depression or increased risk of depression? History of clinical depression   Current Level of Stress per Patient Report Mild   Current Coping Skills Patient Unable to Identify Personal Coping Skills   Initial Patient Health Questionnaire -9 Score (PHQ-9) for depression. 5-9 Minimal symptoms, 10-14 Minor depression, 15-19 Major depression, moderately severe, > 20 Major depression, severe  9   Discharge PHQ-9 Score for Depression 3   Initial [employer] COOP Survey score.  Quality of Life:   If total score > 25 review individual areas where patient rated a 4 or 5.  Consider patients current medical condition and what role that plays on the score.   Adjust treatment protocol to improve areas of concern.  Consider the following:  PHQ9 score, DASI, and re-assessment within the next 30 days to assist with developing treatments.  20   Discharge [employer] COOP Survey Score 20   Stages of Change Action   Interventions Planned Patient to verbalize understanding of behavioral assessment results;Patient to verbalize understanding of negative impact of stress to personal health;Patient to attend stress management class(es);Patient interested in implementing one strategy to reduce current level of stress/anxiety   Interventions In Progress or Completed Patient verbalizes understanding of negative impact of stress to  personal health;Patient attended stress management class(es)   Patient Goal Yes   Goal Description Pt will move from preparation to action    Goal Target Date 02/25/17   Goal Date Met 02/22/17   Progress Towards Goal Pt will identify 3 possitive ways to deal with stress.  2/22 Pt continues to report that his stress is moderate and in addition to medication he uses meditation, biofeed back and walking.  3/10 Pt reports that he is in the action phase of stress management and has it undercontrol.   Psychosocial Comments Dr. Hernandez is aware of pt's deppression   Other Core Components - Hypertension   History of or Diagnosis of Hypertension Yes   Currently taking Anti-Hypertensives Beta blocker;Ace Inhibitor   Other Core Components - Tobacco   History of Tobacco Use Never   Other Core Components Summary   Interventions Planned None   Activity/Exercise History   Activity/Exercise Assessment Discharge   Activity/Exercise Status prior to event? Sedentary   Number of Days Currently participating in Moderate Physical Activity? 7   Number of Days Currently performing  Aerobic Exercise (including rehab)? 6   Number of Minutes per Session Currently of Aerobic Exercise (average)? 30   Current Stage of Change (Physical Activity) Action   Current Stage of Change (Aerobic Exercise) Action   Patient Goals Goal #1   Goal #1 Description Pt will improve in at least 2 risk factors  while attending cardiac rehab 3x a week.  2/22 Pt walks daily at a less than moderate intensity.  For the next 2 weeks until discharge pt will focus on the FITT principle of exercise.   Goal #1 Target Date 03/23/17   Goal #1 Date Met 03/10/17   Goal #1 Progress Towards Goal 1/25 Patient has been increasing his aerobic exercise and successful with weight loss. Current cholesterol status is unknown. Patient will be joining a health club to continue aerobic exercise when discharged from cardiac rehab. Will continue to attend cardiac rehab 3x/week and establish  HIIT exercise to assist in bringing risk factors to guidelines. 3/10 Pt is walking 6x a week at a moderate intensity for 30 minutes.    Exercise Assessment   6 Minute Walk Predicted - Gender Selection Male   6 Minute Walk Predicted (Male) 1634.87   6 Minute Walk Predicted (Female) 1409.81   Initial 6 Minute Walk Distance (Feet) 1290 ft   Discharge 6 Minute Walk Distance (Feet) 1650   Resting HR 67 bpm   Exercise HR 93 bpm   Resting /70   Exercise /72   Effort Rating 7   Current MET Level 5.5   MET Level Goal 5-6+   ECG Rhythm Sinus rhythm;Sinus bradycardia   Ectopy None   Current Symptoms Denies symptoms   Limitations/Restrictions None   Exercise Prescription   Mode Treadmill;Nustep;Ambulation;Weights   Duration/Time 45-60 min   Frequency 3 daysweek   THR (85% of age predicted max HR) 131.75   OMNI Effort Rating (0-10 Scale) 4-6/10   Progression Other (see comments)  (Discharge)   Recommended Home Exercise   Type of Exercise Walking   Frequency (days per week) 6   Duration (minutes per session) 30-45 min   Effort Rating Recommended 4-6/10   30 Day Exercise Plan Pt will continue walking in addition to going to the local health club 3x a week   Current Home Exercise   Type of Exercise Walking   Frequency (days per week) 6   Duration (minutes per session) 30   Follow-up/On-going Support   Provider follow-up needed on the following No follow-up needed   Learning Assessment   Learner Patient   Primary Language English   Preferred Learning Style Listening;Reading;Demonstration;Pictures/Video   Barriers to Learning No barriers noted   Patient Education   Education recommended Exercise Principles   Education classes attended Blood Pressure;Exercise Principles;Medication Overview;Nutrition;Anatomy and Physiology of the Heart;Stress Management   Physician cosignature/electronic signature indicates approval of this ITP document. I have established, reviewed and made necessary changes to the individualized treatment  plan and exercise prescription for this patient.

## 2017-03-16 ENCOUNTER — MYC MEDICAL ADVICE (OUTPATIENT)
Dept: FAMILY MEDICINE | Facility: CLINIC | Age: 67
End: 2017-03-16

## 2017-03-16 NOTE — TELEPHONE ENCOUNTER
Mounika is reporting a couple time per day patient is having low BP of around 92/70's.  Wife is mostly concerned because he now has symptoms when he has these hypotension episodes.  He gets cool, pale, moist skin, dizziness, lightheadedness.    Currently she states he is outside and feeling fine, this only happens a couple times per day.  He is drinking about 6-8 glasses of water per day.  His pulses are good.    She is reporting that PCP dropped patient's 10 mg Lisinopril to 1/2 tablet daily, and she is wondering if it would be OK to just stop the Lisinopril?  She is informed she should be taking patient to ED if he has another episode before we are able to get back to them.  Patient understands and agrees to this plan.  Routing to PCP for further advice.    Christa Chapman RN

## 2017-03-29 ENCOUNTER — MYC REFILL (OUTPATIENT)
Dept: FAMILY MEDICINE | Facility: CLINIC | Age: 67
End: 2017-03-29

## 2017-03-29 DIAGNOSIS — M12.9 ARTHROPATHY: ICD-10-CM

## 2017-03-29 RX ORDER — HYDROCODONE BITARTRATE AND ACETAMINOPHEN 7.5; 325 MG/1; MG/1
1 TABLET ORAL 4 TIMES DAILY PRN
Qty: 100 TABLET | Refills: 0 | Status: SHIPPED | OUTPATIENT
Start: 2017-03-29 | End: 2017-04-26

## 2017-03-29 NOTE — TELEPHONE ENCOUNTER
Message from MyChart:  Original authorizing provider: MD Quan Dean would like a refill of the following medications:  HYDROcodone-acetaminophen (NORCO) 7.5-325 MG per tablet [Jose Hernandez MD]    Preferred pharmacy: [address]     Comment:  Reduced amount (100 vs. 180) still sufficient.

## 2017-04-04 ENCOUNTER — OFFICE VISIT (OUTPATIENT)
Dept: CARDIOLOGY | Facility: CLINIC | Age: 67
End: 2017-04-04
Payer: MEDICARE

## 2017-04-04 VITALS
WEIGHT: 200.2 LBS | OXYGEN SATURATION: 98 % | HEIGHT: 68 IN | DIASTOLIC BLOOD PRESSURE: 64 MMHG | SYSTOLIC BLOOD PRESSURE: 86 MMHG | BODY MASS INDEX: 30.34 KG/M2 | HEART RATE: 60 BPM

## 2017-04-04 DIAGNOSIS — I10 HYPERTENSION GOAL BP (BLOOD PRESSURE) < 140/90: ICD-10-CM

## 2017-04-04 DIAGNOSIS — I25.10 ATHEROSCLEROSIS OF NATIVE CORONARY ARTERY OF NATIVE HEART WITHOUT ANGINA PECTORIS: ICD-10-CM

## 2017-04-04 DIAGNOSIS — I25.750 CORONARY ARTERY DISEASE INVOLVING NATIVE ARTERY OF TRANSPLANTED HEART WITH UNSTABLE ANGINA PECTORIS (H): ICD-10-CM

## 2017-04-04 DIAGNOSIS — E78.5 HYPERLIPIDEMIA LDL GOAL <130: ICD-10-CM

## 2017-04-04 DIAGNOSIS — I10 ESSENTIAL HYPERTENSION WITH GOAL BLOOD PRESSURE LESS THAN 140/90: ICD-10-CM

## 2017-04-04 PROCEDURE — 99214 OFFICE O/P EST MOD 30 MIN: CPT | Performed by: INTERNAL MEDICINE

## 2017-04-04 RX ORDER — LISINOPRIL 5 MG/1
5 TABLET ORAL AT BEDTIME
Qty: 90 TABLET | Refills: 3 | Status: SHIPPED | OUTPATIENT
Start: 2017-04-04 | End: 2017-05-08 | Stop reason: DRUGHIGH

## 2017-04-04 NOTE — PROGRESS NOTES
HPI and Plan:   See dictation    Orders Placed This Encounter   Procedures     Basic metabolic panel     Lipid Profile     ALT     Follow-Up with Cardiologist       Orders Placed This Encounter   Medications     lisinopril (PRINIVIL/ZESTRIL) 5 MG tablet     Sig: Take 1 tablet (5 mg) by mouth At Bedtime     Dispense:  90 tablet     Refill:  3       Medications Discontinued During This Encounter   Medication Reason     metoprolol (LOPRESSOR) 25 MG tablet      lisinopril (PRINIVIL/ZESTRIL) 10 MG tablet Reorder         Encounter Diagnoses   Name Primary?     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)      Essential hypertension with goal blood pressure less than 140/90        CURRENT MEDICATIONS:  Current Outpatient Prescriptions   Medication Sig Dispense Refill     lisinopril (PRINIVIL/ZESTRIL) 5 MG tablet Take 1 tablet (5 mg) by mouth At Bedtime 90 tablet 3     HYDROcodone-acetaminophen (NORCO) 7.5-325 MG per tablet Take 1 tablet by mouth 4 times daily as needed for moderate to severe pain 100 tablet 0     ALPRAZolam (XANAX) 0.5 MG tablet Take 1 tablet (0.5 mg) by mouth 3 times daily as needed for anxiety 90 tablet 1     rosuvastatin (CRESTOR) 40 MG tablet Take 1 tablet (40 mg) by mouth daily 90 tablet 3     clopidogrel (PLAVIX) 75 MG tablet Take 1 tablet (75 mg) by mouth daily 90 tablet 3     aspirin 81 MG EC tablet Take 1 tablet (81 mg) by mouth daily 90 tablet 3     citalopram (CELEXA) 40 MG tablet Take 1 tablet (40 mg) by mouth daily 30 tablet 6     zolpidem (AMBIEN) 10 MG tablet Take 1 tablet (10 mg) by mouth nightly as needed for sleep 30 tablet 5     cetirizine (ZYRTEC) 10 MG tablet Take 10 mg by mouth daily       CIALIS 20 MG tablet Take 20 mg by mouth daily as needed         [DISCONTINUED] lisinopril (PRINIVIL/ZESTRIL) 10 MG tablet Take 1 tablet (10 mg) by mouth daily 90 tablet 1     nitroglycerin (NITROSTAT) 0.4 MG sublingual tablet For chest pain place 1 tablet under the tongue every 5 minutes for 3 doses. If symptoms persist 5 minutes after 1st dose call 911. (Patient not taking: Reported on 4/4/2017) 25 tablet 0       ALLERGIES     Allergies   Allergen Reactions     Animal Dander      Dust Mites      Pollen Extract      Smoke.        PAST MEDICAL HISTORY:  Past Medical History:   Diagnosis Date     Allergy, unspecified not elsewhere classified     Seasonal allergies, pollen, dust, smoke and animals     Coronary atherosclerosis of unspecified type of vessel, native or graft     Coronary artery disease     Malignant neoplasm of prostate (H)     Prostate cancer       PAST SURGICAL HISTORY:  Past Surgical History:   Procedure Laterality Date     ARTHRODESIS FOOT  7/23/2013    Procedure: ARTHRODESIS FOOT;  Great Toe Arthrodesis Left Foot;  Surgeon: Ash Gonzalez DPM;  Location: [facility] OR     ARTHRODESIS FOOT  6/10/2014    Procedure: ARTHRODESIS FOOT;  Surgeon: Ash Gonzalez DPM;  Location:  OR     C LAPAROSCOPY, SURGICAL PROSTATECTOMY, RETROPUBIC RADICAL, W/NERVE SPARING  11/30/2004    With full bilateral pelvic lymphadenectomy.  [facility].     C TOTAL KNEE ARTHROPLASTY  05/01/08    Left knee     COLONOSCOPY  10/7/2013    Procedure: COLONOSCOPY;  Colonoscopy;  Surgeon: Mike Fallon MD;  Location:  GI     HC CORRECT BUNION,SIMPLE  08/11/2005    x3     HC REMV TOE BENIGN BONE LESN  08/11/2005     HERNIORRHAPHY INGUINAL  7/3/2013    Procedure: HERNIORRHAPHY INGUINAL;  Open Repair Inguinal hernia Right with mesh ;  Surgeon: Sam Escobar MD;  Location: [facility] OR     MOHS MICROGRAPHIC PROCEDURE  08/23/11    ear and chin-[facility]     RECONSTRUCT FOREFOOT WITH METATARSOPHALANGEAL (MTP) FUSION  6/10/2014    Procedure: RECONSTRUCT FOREFOOT WITH METATARSOPHALANGEAL (MTP)  "FUSION;  Surgeon: Ash Gonzalez DPM;  Location: [facility] OR     STENT, CORONARY, DEMI       SURGICAL HISTORY OF -   1999/1974    lt knee     SURGICAL HISTORY OF -   10/2004    lithotripsy     SURGICAL HISTORY OF -   11/05    angiogram with stent       FAMILY HISTORY:  Family History   Problem Relation Age of Onset     Hypertension Father      has had MI      Connective Tissue Disorder Mother      LUPUS     HEART DISEASE Mother      poor valve-needing replacement       SOCIAL HISTORY:  Social History     Social History     Marital status:      Spouse name: Alessandra     Number of children: 2     Years of education: N/A     Social History Main Topics     Smoking status: Never Smoker     Smokeless tobacco: Never Used     Alcohol use 5.0 oz/week     10 Cans of beer per week      Comment: occasional      Drug use: No     Sexual activity: Yes     Partners: Female     Other Topics Concern     Not on file     Social History Narrative       Review of Systems:  Skin:  Negative       Eyes:  Negative      ENT:  Negative      Respiratory:  Negative       Cardiovascular:  Negative for;palpitations;chest pain;edema Positive for;lightheadedness in morning feels lightheaded has had some low BP readings   Gastroenterology: Negative      Genitourinary:  Negative      Musculoskeletal:  Positive for joint pain had left knee replaced 15 years ago  Neurologic:         Psychiatric:         Heme/Lymph/Imm:         Endocrine:           Physical Exam:  Vitals: BP (!) 86/64 (BP Location: Left arm, Patient Position: Fowlers, Cuff Size: Adult Large)  Pulse 60  Ht 1.727 m (5' 8\")  Wt 90.8 kg (200 lb 3.2 oz)  SpO2 98%  BMI 30.44 kg/m2    Constitutional:  cooperative, alert and oriented, well developed, well nourished, in no acute distress appears anxious      Skin:  warm and dry to the touch, no apparent skin lesions or masses noted        Head:  normocephalic, no masses or lesions        Eyes:  pupils equal and round        ENT:  no pallor " or cyanosis, dentition good        Neck:  carotid pulses are full and equal bilaterally, JVP normal, no carotid bruit, no thyromegaly        Chest:  clear to auscultation;normal symmetry          Cardiac: regular rhythm;normal S1 and S2     no presence of murmur            Abdomen:  abdomen soft;BS normoactive        Vascular: pulses full and equal, no bruits auscultated                                        Extremities and Back:  no edema;no deformities, clubbing, cyanosis, erythema observed              Neurological:  no gross motor deficits;affect appropriate, oriented to time, person and place              CC  No referring provider defined for this encounter.

## 2017-04-04 NOTE — MR AVS SNAPSHOT
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)        Essential hypertension with goal blood pressure less than 140/90           Follow-ups after your visit        Additional Services     Follow-Up with Cardiologist                 Future tests that were ordered for you today     Open Future Orders        Priority Expected Expires Ordered    Basic metabolic panel Routine 10/1/2017 4/4/2018 4/4/2017    Lipid Profile Routine 10/1/2017 4/4/2018 4/4/2017    ALT Routine 10/1/2017 4/4/2018 4/4/2017    Follow-Up with Cardiologist Routine 10/1/2017 4/4/2018 4/4/2017            Who to contact     If you have questions or need follow up information about today's clinic visit or your schedule please contact [facility] directly at [phone].  Normal or non-critical lab and imaging results will be communicated to you by MyChart, letter or phone within 4 business days after the clinic has received the results. If you do not hear from us within 7 days, please contact the clinic through MyChart or phone. If you have a critical or abnormal lab result, we will notify you by phone as soon as possible.  Submit refill requests through [employer] or call your pharmacy and they will forward the refill request to us. Please allow 3 business days for your refill to be completed.          Additional Information About Your Visit        MyChart Information     [employer] gives you secure access to your  "electronic health record. If you see a primary care provider, you can also send messages to your care team and make appointments. If you have questions, please call your primary care clinic.  If you do not have a primary care provider, please call [phone] and they will assist you.        Care EveryWhere ID     This is your Care EveryWhere ID. This could be used by other organizations to access your [facility] medical records  [id number]        Your Vitals Were     Pulse Height Pulse Oximetry BMI (Body Mass Index)          60 1.727 m (5' 8\") 98% 30.44 kg/m2         Blood Pressure from Last 3 Encounters:   04/04/17 (!) 86/64   03/03/17 120/70   02/01/17 100/60    Weight from Last 3 Encounters:   04/04/17 90.8 kg (200 lb 3.2 oz)   03/10/17 98.4 kg (217 lb)   03/03/17 91.2 kg (201 lb)              We Performed the Following     Follow-Up with Cardiologist          Today's Medication Changes          These changes are accurate as of: 4/4/17  3:38 PM.  If you have any questions, ask your nurse or doctor.               These medicines have changed or have updated prescriptions.        Dose/Directions    lisinopril 5 MG tablet   Commonly known as:  PRINIVIL/ZESTRIL   This may have changed:    - medication strength  - how much to take  - when to take this   Used for:  Essential hypertension with goal blood pressure less than 140/90        Dose:  5 mg   Take 1 tablet (5 mg) by mouth At Bedtime   Quantity:  90 tablet   Refills:  3         Stop taking these medicines if you haven't already. Please contact your care team if you have questions.     metoprolol 25 MG tablet   Commonly known as:  LOPRESSOR                Where to get your medicines      These medications were sent to [facility] [place]  [place], [place] - [address]  [address], [place] [address]     Phone:  [phone]     lisinopril 5 MG tablet                Primary Care Provider Office Phone # Fax #    Jose Hernandez [phone] " [phone]       [facility] [address] [place] [address]        Thank you!     Thank you for choosing [facility]  for your care. Our goal is always to provide you with excellent care. Hearing back from our patients is one way we can continue to improve our services. Please take a few minutes to complete the written survey that you may receive in the mail after your visit with us. Thank you!             Your Updated Medication List - Protect others around you: Learn how to safely use, store and throw away your medicines at www.disposemymeds.org.          This list is accurate as of: 4/4/17  3:38 PM.  Always use your most recent med list.                   Brand Name Dispense Instructions for use    ALPRAZolam 0.5 MG tablet    XANAX    90 tablet    Take 1 tablet (0.5 mg) by mouth 3 times daily as needed for anxiety       aspirin 81 MG EC tablet     90 tablet    Take 1 tablet (81 mg) by mouth daily       cetirizine 10 MG tablet    zyrTEC     Take 10 mg by mouth daily       CIALIS 20 MG tablet   Generic drug:  tadalafil      Take 20 mg by mouth daily as needed       citalopram 40 MG tablet    celeXA    30 tablet    Take 1 tablet (40 mg) by mouth daily       clopidogrel 75 MG tablet    PLAVIX    90 tablet    Take 1 tablet (75 mg) by mouth daily       HYDROcodone-acetaminophen 7.5-325 MG per tablet    NORCO    100 tablet    Take 1 tablet by mouth 4 times daily as needed for moderate to severe pain       lisinopril 5 MG tablet    PRINIVIL/ZESTRIL    90 tablet    Take 1 tablet (5 mg) by mouth At Bedtime       nitroglycerin 0.4 MG sublingual tablet    NITROSTAT    25 tablet    For chest pain place 1 tablet under the tongue every 5 minutes for 3 doses. If symptoms persist 5 minutes after 1st dose call 911.       rosuvastatin 40 MG tablet    CRESTOR    90 tablet    Take 1 tablet (40 mg) by mouth daily       zolpidem 10 MG tablet    AMBIEN    30 tablet    Take 1 tablet (10 mg) by  mouth nightly as needed for sleep

## 2017-04-04 NOTE — LETTER
4/4/2017      RE: Quan Murphy  [address] [place] [place] [address]       Dear Colleague,    Thank you for the opportunity to participate in the care of your patient, Quan Murphy, at the [facility]. Please see a copy of my visit note below.    HPI and Plan:   See dictation    Orders Placed This Encounter   Procedures     Basic metabolic panel     Lipid Profile     ALT     Follow-Up with Cardiologist       Orders Placed This Encounter   Medications     lisinopril (PRINIVIL/ZESTRIL) 5 MG tablet     Sig: Take 1 tablet (5 mg) by mouth At Bedtime     Dispense:  90 tablet     Refill:  3       Medications Discontinued During This Encounter   Medication Reason     metoprolol (LOPRESSOR) 25 MG tablet      lisinopril (PRINIVIL/ZESTRIL) 10 MG tablet Reorder         Encounter Diagnoses   Name Primary?     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)      Essential hypertension with goal blood pressure less than 140/90        CURRENT MEDICATIONS:  Current Outpatient Prescriptions   Medication Sig Dispense Refill     lisinopril (PRINIVIL/ZESTRIL) 5 MG tablet Take 1 tablet (5 mg) by mouth At Bedtime 90 tablet 3     HYDROcodone-acetaminophen (NORCO) 7.5-325 MG per tablet Take 1 tablet by mouth 4 times daily as needed for moderate to severe pain 100 tablet 0     ALPRAZolam (XANAX) 0.5 MG tablet Take 1 tablet (0.5 mg) by mouth 3 times daily as needed for anxiety 90 tablet 1     rosuvastatin (CRESTOR) 40 MG tablet Take 1 tablet (40 mg) by mouth daily 90 tablet 3     clopidogrel (PLAVIX) 75 MG tablet Take 1 tablet (75 mg) by mouth daily 90 tablet 3     aspirin 81 MG EC tablet Take 1 tablet (81 mg) by mouth daily 90 tablet 3      citalopram (CELEXA) 40 MG tablet Take 1 tablet (40 mg) by mouth daily 30 tablet 6     zolpidem (AMBIEN) 10 MG tablet Take 1 tablet (10 mg) by mouth nightly as needed for sleep 30 tablet 5     cetirizine (ZYRTEC) 10 MG tablet Take 10 mg by mouth daily       CIALIS 20 MG tablet Take 20 mg by mouth daily as needed        [DISCONTINUED] lisinopril (PRINIVIL/ZESTRIL) 10 MG tablet Take 1 tablet (10 mg) by mouth daily 90 tablet 1     nitroglycerin (NITROSTAT) 0.4 MG sublingual tablet For chest pain place 1 tablet under the tongue every 5 minutes for 3 doses. If symptoms persist 5 minutes after 1st dose call 911. (Patient not taking: Reported on 4/4/2017) 25 tablet 0       ALLERGIES     Allergies   Allergen Reactions     Animal Dander      Dust Mites      Pollen Extract      Smoke.        PAST MEDICAL HISTORY:  Past Medical History:   Diagnosis Date     Allergy, unspecified not elsewhere classified     Seasonal allergies, pollen, dust, smoke and animals     Coronary atherosclerosis of unspecified type of vessel, native or graft     Coronary artery disease     Malignant neoplasm of prostate (H)     Prostate cancer       PAST SURGICAL HISTORY:  Past Surgical History:   Procedure Laterality Date     ARTHRODESIS FOOT  7/23/2013    Procedure: ARTHRODESIS FOOT;  Great Toe Arthrodesis Left Foot;  Surgeon: Ash Gonzalez DPM;  Location: [facility] OR     ARTHRODESIS FOOT  6/10/2014    Procedure: ARTHRODESIS FOOT;  Surgeon: Ash Gonzalez DPM;  Location: [facility] OR     C LAPAROSCOPY, SURGICAL PROSTATECTOMY, RETROPUBIC RADICAL, W/NERVE SPARING  11/30/2004    [facility]     C TOTAL KNEE ARTHROPLASTY  05/01/08    Left knee     COLONOSCOPY  10/7/2013    Procedure: COLONOSCOPY;  Colonoscopy;  Surgeon: Mike Fallon MD;  Location: [facility] GI     HC CORRECT BUNION,SIMPLE  08/11/2005    x3     HC REMV TOE BENIGN BONE LESN  08/11/2005     HERNIORRHAPHY INGUINAL  7/3/2013    Procedure: HERNIORRHAPHY  "INGUINAL;  Open Repair Inguinal hernia Right with mesh ;  Surgeon: Sam Escobar MD;  Location: [facility] OR     MOHS MICROGRAPHIC PROCEDURE  08/23/11    ear and chin-[facility]     RECONSTRUCT FOREFOOT WITH METATARSOPHALANGEAL (MTP) FUSION  6/10/2014    Procedure: RECONSTRUCT FOREFOOT WITH METATARSOPHALANGEAL (MTP) FUSION;  Surgeon: Ash Gonzalez DPM;  Location: [facility] OR     STENT, CORONARY, DEMI       SURGICAL HISTORY OF -   1999/1974    lt knee     SURGICAL HISTORY OF -   10/2004    lithotripsy     SURGICAL HISTORY OF - 11/05    angiogram with stent       FAMILY HISTORY:  Family History   Problem Relation Age of Onset     Hypertension Father      has had MI      Connective Tissue Disorder Mother      LUPUS     HEART DISEASE Mother      poor valve-needing replacement       SOCIAL HISTORY:  Social History     Social History     Marital status:      Spouse name: Alessandra     Number of children: 2     Years of education: N/A     Social History Main Topics     Smoking status: Never Smoker     Smokeless tobacco: Never Used     Alcohol use 5.0 oz/week     10 Cans of beer per week      Comment: occasional      Drug use: No     Sexual activity: Yes     Partners: Female     Other Topics Concern     Not on file     Social History Narrative       Review of Systems:  Skin:  Negative       Eyes:  Negative      ENT:  Negative      Respiratory:  Negative       Cardiovascular:  Negative for;palpitations;chest pain;edema Positive for;lightheadedness in morning feels lightheaded has had some low BP readings   Gastroenterology: Negative      Genitourinary:  Negative      Musculoskeletal:  Positive for joint pain had left knee replaced 15 years ago  Neurologic:         Psychiatric:         Heme/Lymph/Imm:         Endocrine:           Physical Exam:  Vitals: BP (!) 86/64 (BP Location: Left arm, Patient Position: Fowlers, Cuff Size: Adult Large)  Pulse 60  Ht 1.727 m (5' 8\")  Wt 90.8 kg (200 lb 3.2 oz)  SpO2 98%  " BMI 30.44 kg/m2    Constitutional:  cooperative, alert and oriented, well developed, well nourished, in no acute distress appears anxious      Skin:  warm and dry to the touch, no apparent skin lesions or masses noted        Head:  normocephalic, no masses or lesions        Eyes:  pupils equal and round        ENT:  no pallor or cyanosis, dentition good        Neck:  carotid pulses are full and equal bilaterally, JVP normal, no carotid bruit, no thyromegaly        Chest:  clear to auscultation;normal symmetry          Cardiac: regular rhythm;normal S1 and S2     no presence of murmur            Abdomen:  abdomen soft;BS normoactive        Vascular: pulses full and equal, no bruits auscultated                                        Extremities and Back:  no edema;no deformities, clubbing, cyanosis, erythema observed              Neurological:  no gross motor deficits;affect appropriate, oriented to time, person and place              CC  No referring provider defined for this encounter.      Please do not hesitate to contact me if you have any questions/concerns.     Sincerely,     AIDEN ALEXANDRE MD

## 2017-04-04 NOTE — LETTER
4/4/2017    Jose Hernandez MD  [facility]   [address] [place] [address]    RE: Quan GANT Jeffrey       Dear Colleague,    I had the opportunity to see Mr. Quan Murphy in Cardiology Clinic today at the [facility] [place] [facility] in [place] for reevaluation of coronary artery disease.  As you recall, he is a 66-year-old gentleman who developed left shoulder and arm pain on 12/23/2016 and had some abnormal ECG findings in the emergency room which prompted his transfer down to [facility].  Unfortunately, he was stuck in the hospital over the holiday weekend but eventually got to the cardiac catheterization lab on 12/27/2016 for a coronary angiogram.  He had at least a 70% stenosis within the proximal left anterior descending which was significant by fractional flow wire measurement, with a fractional flow reserve of 0.70.  He underwent stenting within a long portion of his proximal LAD with excellent result and he had no other significant coronary artery disease.  His echocardiogram was completely normal and he was discharged without complication.        Since then, his blood pressures have been running low and he has been fatigued and occasionally lightheaded.  His metoprolol dose has been cut down as has his lisinopril dose.  Currently, he is on metoprolol tartrate 12.5 b.i.d. and lisinopril 5 mg each day at bedtime.      PHYSICAL EXAMINATION:  Today his blood pressure is 86/54, heart rate 60 and weight 200 pounds.  His lungs are clear.  His heart rhythm is regular.  He has no cardiac murmurs or carotid bruits.     Outpatient Encounter Prescriptions as of 4/4/2017   Medication Sig Dispense Refill     [DISCONTINUED] lisinopril (PRINIVIL/ZESTRIL) 5 MG tablet Take 1 tablet (5 mg) by mouth At Bedtime 90 tablet 3     [DISCONTINUED] HYDROcodone-acetaminophen (NORCO) 7.5-325 MG per tablet Take 1 tablet by mouth 4 times daily as needed for moderate to severe pain 100  tablet 0     [DISCONTINUED] ALPRAZolam (XANAX) 0.5 MG tablet Take 1 tablet (0.5 mg) by mouth 3 times daily as needed for anxiety 90 tablet 1     rosuvastatin (CRESTOR) 40 MG tablet Take 1 tablet (40 mg) by mouth daily 90 tablet 3     clopidogrel (PLAVIX) 75 MG tablet Take 1 tablet (75 mg) by mouth daily 90 tablet 3     aspirin 81 MG EC tablet Take 1 tablet (81 mg) by mouth daily 90 tablet 3     citalopram (CELEXA) 40 MG tablet Take 1 tablet (40 mg) by mouth daily 30 tablet 6     [DISCONTINUED] zolpidem (AMBIEN) 10 MG tablet Take 1 tablet (10 mg) by mouth nightly as needed for sleep 30 tablet 5     cetirizine (ZYRTEC) 10 MG tablet Take 10 mg by mouth daily       CIALIS 20 MG tablet Take 20 mg by mouth daily as needed        [DISCONTINUED] metoprolol (LOPRESSOR) 25 MG tablet TAKE ONE TABLET BY MOUTH TWICE A  tablet 2     [DISCONTINUED] lisinopril (PRINIVIL/ZESTRIL) 10 MG tablet Take 1 tablet (10 mg) by mouth daily 90 tablet 1     nitroglycerin (NITROSTAT) 0.4 MG sublingual tablet For chest pain place 1 tablet under the tongue every 5 minutes for 3 doses. If symptoms persist 5 minutes after 1st dose call 911. (Patient not taking: Reported on 4/4/2017) 25 tablet 0     No facility-administered encounter medications on file as of 4/4/2017.       IMPRESSIONS:  Mr. Quan Murphy is a 66-year-old gentleman who presented to [facility] Emergency Room with an acute coronary syndrome on 12/23/2016 and was transferred down to [facility], where he underwent stenting of his proximal LAD.  He is doing well generally, although his blood pressure has been low and he has had some fatigue and lightheadedness from this.  I suggested that he eliminate metoprolol altogether.  He did not have a myocardial infarction and has normal left ventricular function.  I do not think metoprolol is necessary.  I will try to continue a small dose of lisinopril 5 mg a day, although we could certainly discontinue  that as well if he continues to have lightheadedness and low blood pressure issues.      He will continue his Crestor, aspirin and Plavix and follow up with me again in 6 months for reevaluation.     Again, thank you for allowing me to participate in the care of your patient.      Sincerely,    AIDEN ALEXANDRE MD     [facility]

## 2017-04-05 NOTE — PROGRESS NOTES
HISTORY OF PRESENT ILLNESS:  I had the opportunity to see Mr. Quan Murphy in Cardiology Clinic today at the [facility] in [place] for reevaluation of coronary artery disease.  As you recall, he is a 66-year-old gentleman who developed left shoulder and arm pain on 12/23/2016 and had some abnormal ECG findings in the emergency room which prompted his transfer down to [facility].  Unfortunately, he was stuck in the hospital over the holiday weekend but eventually got to the cardiac catheterization lab on 12/27/2016 for a coronary angiogram.  He had at least a 70% stenosis within the proximal left anterior descending which was significant by fractional flow wire measurement, with a fractional flow reserve of 0.70.  He underwent stenting within a long portion of his proximal LAD with excellent result and he had no other significant coronary artery disease.  His echocardiogram was completely normal and he was discharged without complication.        Since then, his blood pressures have been running low and he has been fatigued and occasionally lightheaded.  His metoprolol dose has been cut down as has his lisinopril dose.  Currently, he is on metoprolol tartrate 12.5 b.i.d. and lisinopril 5 mg each day at bedtime.      PHYSICAL EXAMINATION:  Today his blood pressure is 86/54, heart rate 60 and weight 200 pounds.  His lungs are clear.  His heart rhythm is regular.  He has no cardiac murmurs or carotid bruits.      IMPRESSIONS:  Mr. Quan Murphy is a 66-year-old gentleman who presented to [facility] Emergency Room with an acute coronary syndrome on 12/23/2016 and was transferred down to [facility], where he underwent stenting of his proximal LAD.  He is doing well generally, although his blood pressure has been low and he has had some fatigue and lightheadedness from this.  I suggested that he eliminate metoprolol altogether.  He did not have a  myocardial infarction and has normal left ventricular function.  I do not think metoprolol is necessary.  I will try to continue a small dose of lisinopril 5 mg a day, although we could certainly discontinue that as well if he continues to have lightheadedness and low blood pressure issues.      He will continue his Crestor, aspirin and Plavix and follow up with me again in 6 months for reevaluation.      cc:      [place] [address]         A[facility] ALEXANDRE MD, [facility]             D: 2017 15:45   T: 2017 09:53   MT: JOVAN      Name:     CHUCHO MONTOYA   MRN:      [id number]        Account:      [id number]   :      1950           Service Date: 2017      Document: [id number]

## 2017-04-26 ENCOUNTER — MYC REFILL (OUTPATIENT)
Dept: FAMILY MEDICINE | Facility: CLINIC | Age: 67
End: 2017-04-26

## 2017-04-26 DIAGNOSIS — M12.9 ARTHROPATHY: ICD-10-CM

## 2017-04-26 RX ORDER — HYDROCODONE BITARTRATE AND ACETAMINOPHEN 7.5; 325 MG/1; MG/1
1 TABLET ORAL 4 TIMES DAILY PRN
Qty: 100 TABLET | Refills: 0 | Status: SHIPPED | OUTPATIENT
Start: 2017-04-26 | End: 2017-05-22

## 2017-04-26 NOTE — TELEPHONE ENCOUNTER
Message from MyChart:  Original authorizing provider: MD Quan Dean would like a refill of the following medications:  HYDROcodone-acetaminophen (NORCO) 7.5-325 MG per tablet [Jose Hernandez MD]    Preferred pharmacy: [address]     Comment:

## 2017-05-05 ENCOUNTER — MYC REFILL (OUTPATIENT)
Dept: FAMILY MEDICINE | Facility: CLINIC | Age: 67
End: 2017-05-05

## 2017-05-05 DIAGNOSIS — F41.9 ANXIETY: ICD-10-CM

## 2017-05-05 NOTE — TELEPHONE ENCOUNTER
Message from MyChart:  Original authorizing provider: MD Quan Dean would like a refill of the following medications:  ALPRAZolam (XANAX) 0.5 MG tablet [Jose Hernandez MD]    Preferred pharmacy: [address]     Comment:

## 2017-05-08 DIAGNOSIS — I10 ESSENTIAL HYPERTENSION WITH GOAL BLOOD PRESSURE LESS THAN 140/90: Primary | ICD-10-CM

## 2017-05-08 RX ORDER — ALPRAZOLAM 0.5 MG
0.5 TABLET ORAL 3 TIMES DAILY PRN
Qty: 90 TABLET | Refills: 1 | Status: SHIPPED | OUTPATIENT
Start: 2017-05-08 | End: 2017-07-05

## 2017-05-22 ENCOUNTER — MYC REFILL (OUTPATIENT)
Dept: FAMILY MEDICINE | Facility: CLINIC | Age: 67
End: 2017-05-22

## 2017-05-22 DIAGNOSIS — M12.9 ARTHROPATHY: ICD-10-CM

## 2017-05-22 DIAGNOSIS — G47.01 SLEEP DISORDER DUE TO A GENERAL MEDICAL CONDITION, INSOMNIA TYPE: ICD-10-CM

## 2017-05-22 NOTE — TELEPHONE ENCOUNTER
Message from MyChart:  Original authorizing provider: MD Quan Dean would like a refill of the following medications:  zolpidem (AMBIEN) 10 MG tablet [Jose Hernandez MD]  HYDROcodone-acetaminophen (NORCO) 7.5-325 MG per tablet [Jose Hernandez MD]    Preferred pharmacy: [address]     Comment:

## 2017-05-24 RX ORDER — ZOLPIDEM TARTRATE 10 MG/1
10 TABLET ORAL
Qty: 30 TABLET | Refills: 5 | Status: SHIPPED | OUTPATIENT
Start: 2017-05-24 | End: 2017-11-12

## 2017-05-24 RX ORDER — HYDROCODONE BITARTRATE AND ACETAMINOPHEN 7.5; 325 MG/1; MG/1
1 TABLET ORAL 4 TIMES DAILY PRN
Qty: 100 TABLET | Refills: 0 | Status: SHIPPED | OUTPATIENT
Start: 2017-05-24 | End: 2017-06-20

## 2017-06-02 ENCOUNTER — MYC MEDICAL ADVICE (OUTPATIENT)
Dept: FAMILY MEDICINE | Facility: CLINIC | Age: 67
End: 2017-06-02

## 2017-06-02 NOTE — ADDENDUM NOTE
Encounter addended by: Crystal Dickerson  on: 6/2/2017 11:49 AM<BR>     Actions taken: Charge Capture section accepted

## 2017-06-02 NOTE — TELEPHONE ENCOUNTER
[facility] also called to fu on the Prior Auth  for the Ambien.  [facility] will be refaxing the info, please advise [phone] listen for the prompt  Thank you,  Aditi Robledo  Patient Representative

## 2017-06-05 NOTE — TELEPHONE ENCOUNTER
I never received anything for a prior auth for Quan.  I called the pharmacy and they will send me what I need to start this.

## 2017-06-06 ENCOUNTER — TELEPHONE (OUTPATIENT)
Dept: FAMILY MEDICINE | Facility: CLINIC | Age: 67
End: 2017-06-06

## 2017-06-06 NOTE — TELEPHONE ENCOUNTER
[facility] called and I answered questions for them to hopefully approve this medication.     PA needed on:Ambien 10mg  Insurance:[employer]  Ins. Phone:[phone]  Patient ID:[id number] Group [id number] PCN meddadv  Please let us know when PA is granted/denied. Thank You      Brandie Travis CPhT  [facility]  [phone]

## 2017-06-06 NOTE — TELEPHONE ENCOUNTER
PA needed on:Ambien 10mg  Insurance:[employer]  Ins. Phone:[phone]  Patient ID:[id number] Group [id number] PCN meddadv  Please let us know when PA is granted/denied. Thank You      Brandie Travis CPhT  [facility] [place]  [phone]

## 2017-06-20 ENCOUNTER — MYC REFILL (OUTPATIENT)
Dept: FAMILY MEDICINE | Facility: CLINIC | Age: 67
End: 2017-06-20

## 2017-06-20 DIAGNOSIS — E29.1 HYPOGONADISM MALE: ICD-10-CM

## 2017-06-20 DIAGNOSIS — M12.9 ARTHROPATHY: ICD-10-CM

## 2017-06-20 DIAGNOSIS — C61 PROSTATE CANCER (H): Primary | ICD-10-CM

## 2017-06-20 NOTE — TELEPHONE ENCOUNTER
Message from MyChart:  Original authorizing provider: MD Quan Dean would like a refill of the following medications:  HYDROcodone-acetaminophen (NORCO) 7.5-325 MG per tablet [Jose Hernandez MD]    Preferred pharmacy: [address]     Comment:  Request is a bit early, but out of town Thursday morning through Monday afternoon. Thank you   Maritza

## 2017-06-21 DIAGNOSIS — C61 PROSTATE CANCER (H): ICD-10-CM

## 2017-06-21 DIAGNOSIS — E29.1 HYPOGONADISM MALE: ICD-10-CM

## 2017-06-21 PROCEDURE — 84153 ASSAY OF PSA TOTAL: CPT | Performed by: UROLOGY

## 2017-06-21 PROCEDURE — 36415 COLL VENOUS BLD VENIPUNCTURE: CPT | Performed by: UROLOGY

## 2017-06-21 PROCEDURE — 84154 ASSAY OF PSA FREE: CPT | Performed by: UROLOGY

## 2017-06-21 PROCEDURE — 84403 ASSAY OF TOTAL TESTOSTERONE: CPT | Performed by: UROLOGY

## 2017-06-21 RX ORDER — HYDROCODONE BITARTRATE AND ACETAMINOPHEN 7.5; 325 MG/1; MG/1
1 TABLET ORAL 4 TIMES DAILY PRN
Qty: 100 TABLET | Refills: 0 | Status: SHIPPED | OUTPATIENT
Start: 2017-06-21 | End: 2017-07-26

## 2017-06-22 LAB
PSA FREE MFR SERPL: NORMAL %
PSA FREE SERPL-MCNC: NORMAL NG/ML
PSA SERPL-MCNC: NORMAL NG/ML

## 2017-06-23 LAB — TESTOST SERPL-MCNC: 210 NG/DL (ref 240–950)

## 2017-07-04 ENCOUNTER — MYC MEDICAL ADVICE (OUTPATIENT)
Dept: FAMILY MEDICINE | Facility: CLINIC | Age: 67
End: 2017-07-04

## 2017-07-05 DIAGNOSIS — F41.9 ANXIETY: ICD-10-CM

## 2017-07-05 NOTE — TELEPHONE ENCOUNTER
alprazolam      Last Written Prescription Date: 05/08/2017  Last Fill Quantity: 90,  # refills: 0   Last Office Visit with [facility], [facility] or [facility] prescribing provider: 03/03/2017    Thank You,  Dany Somers, Pharmacy [facility] [place]

## 2017-07-07 RX ORDER — ALPRAZOLAM 0.5 MG
0.5 TABLET ORAL 3 TIMES DAILY PRN
Qty: 90 TABLET | Refills: 0 | Status: SHIPPED | OUTPATIENT
Start: 2017-07-07 | End: 2017-08-04

## 2017-07-07 NOTE — TELEPHONE ENCOUNTER
Patient called to fu on RX, please advise if a covering provider is able to approve today?  Thank you,  Aditi Robledo  Patient Representative

## 2017-07-14 ENCOUNTER — APPOINTMENT (OUTPATIENT)
Dept: GENERAL RADIOLOGY | Facility: CLINIC | Age: 67
End: 2017-07-14
Attending: FAMILY MEDICINE
Payer: MEDICARE

## 2017-07-14 ENCOUNTER — HOSPITAL ENCOUNTER (EMERGENCY)
Facility: CLINIC | Age: 67
Discharge: HOME | End: 2017-07-14
Attending: FAMILY MEDICINE | Admitting: FAMILY MEDICINE
Payer: MEDICARE

## 2017-07-14 VITALS
DIASTOLIC BLOOD PRESSURE: 83 MMHG | HEIGHT: 68 IN | HEART RATE: 93 BPM | BODY MASS INDEX: 30.16 KG/M2 | OXYGEN SATURATION: 93 % | TEMPERATURE: 97.3 F | RESPIRATION RATE: 14 BRPM | SYSTOLIC BLOOD PRESSURE: 121 MMHG | WEIGHT: 199 LBS
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
## 2017-07-14 DIAGNOSIS — S52.531A CLOSED COLLES' FRACTURE OF RIGHT RADIUS, INITIAL ENCOUNTER: ICD-10-CM

## 2017-07-14 LAB
ANION GAP SERPL CALCULATED.3IONS-SCNC: 13 MMOL/L (ref 3–14)
BASOPHILS # BLD AUTO: 0 10E9/L (ref 0–0.2)
BASOPHILS NFR BLD AUTO: 0.3 %
BUN SERPL-MCNC: 19 MG/DL (ref 7–30)
CALCIUM SERPL-MCNC: 8.6 MG/DL (ref 8.5–10.1)
CHLORIDE SERPL-SCNC: 106 MMOL/L (ref 94–109)
CO2 SERPL-SCNC: 22 MMOL/L (ref 20–32)
CREAT SERPL-MCNC: 1.17 MG/DL (ref 0.66–1.25)
DIFFERENTIAL METHOD BLD: NORMAL
EOSINOPHIL # BLD AUTO: 0 10E9/L (ref 0–0.7)
EOSINOPHIL NFR BLD AUTO: 0.7 %
ERYTHROCYTE [DISTWIDTH] IN BLOOD BY AUTOMATED COUNT: 14.6 % (ref 10–15)
GFR SERPL CREATININE-BSD FRML MDRD: 62 ML/MIN/1.7M2
GLUCOSE SERPL-MCNC: 83 MG/DL (ref 70–99)
HCT VFR BLD AUTO: 41.7 % (ref 40–53)
HGB BLD-MCNC: 13.8 G/DL (ref 13.3–17.7)
IMM GRANULOCYTES # BLD: 0 10E9/L (ref 0–0.4)
IMM GRANULOCYTES NFR BLD: 0.5 %
LYMPHOCYTES # BLD AUTO: 2 10E9/L (ref 0.8–5.3)
LYMPHOCYTES NFR BLD AUTO: 33.9 %
MCH RBC QN AUTO: 28.7 PG (ref 26.5–33)
MCHC RBC AUTO-ENTMCNC: 33.1 G/DL (ref 31.5–36.5)
MCV RBC AUTO: 87 FL (ref 78–100)
MONOCYTES # BLD AUTO: 0.6 10E9/L (ref 0–1.3)
MONOCYTES NFR BLD AUTO: 9.4 %
NEUTROPHILS # BLD AUTO: 3.3 10E9/L (ref 1.6–8.3)
NEUTROPHILS NFR BLD AUTO: 55.2 %
PLATELET # BLD AUTO: 174 10E9/L (ref 150–450)
POTASSIUM SERPL-SCNC: 3.5 MMOL/L (ref 3.4–5.3)
RBC # BLD AUTO: 4.81 10E12/L (ref 4.4–5.9)
SODIUM SERPL-SCNC: 141 MMOL/L (ref 133–144)
WBC # BLD AUTO: 6 10E9/L (ref 4–11)

## 2017-07-14 PROCEDURE — 96361 HYDRATE IV INFUSION ADD-ON: CPT | Performed by: FAMILY MEDICINE

## 2017-07-14 PROCEDURE — 80048 BASIC METABOLIC PNL TOTAL CA: CPT | Performed by: FAMILY MEDICINE

## 2017-07-14 PROCEDURE — 93308 TTE F-UP OR LMTD: CPT | Mod: Z6 | Performed by: FAMILY MEDICINE

## 2017-07-14 PROCEDURE — 99285 EMERGENCY DEPT VISIT HI MDM: CPT | Mod: 25 | Performed by: FAMILY MEDICINE

## 2017-07-14 PROCEDURE — 96374 THER/PROPH/DIAG INJ IV PUSH: CPT | Mod: 59 | Performed by: FAMILY MEDICINE

## 2017-07-14 PROCEDURE — 96376 TX/PRO/DX INJ SAME DRUG ADON: CPT | Performed by: FAMILY MEDICINE

## 2017-07-14 PROCEDURE — 76604 US EXAM CHEST: CPT | Mod: Z6 | Performed by: FAMILY MEDICINE

## 2017-07-14 PROCEDURE — 85025 COMPLETE CBC W/AUTO DIFF WBC: CPT | Performed by: FAMILY MEDICINE

## 2017-07-14 PROCEDURE — 25605 CLTX DST RDL FX/EPHYS SEP W/: CPT | Mod: 54 | Performed by: FAMILY MEDICINE

## 2017-07-14 PROCEDURE — 25605 CLTX DST RDL FX/EPHYS SEP W/: CPT | Performed by: FAMILY MEDICINE

## 2017-07-14 PROCEDURE — 25000125 ZZHC RX 250: Performed by: FAMILY MEDICINE

## 2017-07-14 PROCEDURE — 93308 TTE F-UP OR LMTD: CPT | Performed by: FAMILY MEDICINE

## 2017-07-14 PROCEDURE — 96375 TX/PRO/DX INJ NEW DRUG ADDON: CPT | Mod: 59 | Performed by: FAMILY MEDICINE

## 2017-07-14 PROCEDURE — S0020 INJECTION, BUPIVICAINE HYDRO: HCPCS | Performed by: FAMILY MEDICINE

## 2017-07-14 PROCEDURE — 40000986 XR WRIST PORT RT  2 VW: Mod: RT

## 2017-07-14 PROCEDURE — 25000128 H RX IP 250 OP 636: Performed by: FAMILY MEDICINE

## 2017-07-14 PROCEDURE — 73110 X-RAY EXAM OF WRIST: CPT | Mod: TC,RT

## 2017-07-14 PROCEDURE — 76604 US EXAM CHEST: CPT | Performed by: FAMILY MEDICINE

## 2017-07-14 PROCEDURE — 76705 ECHO EXAM OF ABDOMEN: CPT | Performed by: FAMILY MEDICINE

## 2017-07-14 PROCEDURE — 76705 ECHO EXAM OF ABDOMEN: CPT | Mod: Z6 | Performed by: FAMILY MEDICINE

## 2017-07-14 RX ORDER — SODIUM CHLORIDE 9 MG/ML
1000 INJECTION, SOLUTION INTRAVENOUS CONTINUOUS
Status: DISCONTINUED | OUTPATIENT
Start: 2017-07-14 | End: 2017-07-15 | Stop reason: HOSPADM

## 2017-07-14 RX ORDER — OXYCODONE HYDROCHLORIDE 5 MG/1
5-10 TABLET ORAL EVERY 4 HOURS PRN
Qty: 20 TABLET | Refills: 0 | Status: SHIPPED | OUTPATIENT
Start: 2017-07-14 | End: 2017-08-03

## 2017-07-14 RX ORDER — LIDOCAINE 40 MG/G
CREAM TOPICAL
Status: DISCONTINUED | OUTPATIENT
Start: 2017-07-14 | End: 2017-07-15 | Stop reason: HOSPADM

## 2017-07-14 RX ORDER — BUPIVACAINE HYDROCHLORIDE 2.5 MG/ML
30 INJECTION, SOLUTION EPIDURAL; INFILTRATION; INTRACAUDAL ONCE
Status: COMPLETED | OUTPATIENT
Start: 2017-07-14 | End: 2017-07-14

## 2017-07-14 RX ORDER — LORAZEPAM 2 MG/ML
1 INJECTION INTRAMUSCULAR ONCE
Status: COMPLETED | OUTPATIENT
Start: 2017-07-14 | End: 2017-07-14

## 2017-07-14 RX ORDER — FENTANYL CITRATE 50 UG/ML
50-100 INJECTION, SOLUTION INTRAMUSCULAR; INTRAVENOUS
Status: DISCONTINUED | OUTPATIENT
Start: 2017-07-14 | End: 2017-07-15 | Stop reason: HOSPADM

## 2017-07-14 RX ADMIN — SODIUM CHLORIDE 1000 ML: 9 INJECTION, SOLUTION INTRAVENOUS at 19:38

## 2017-07-14 RX ADMIN — SODIUM CHLORIDE 1000 ML: 9 INJECTION, SOLUTION INTRAVENOUS at 20:42

## 2017-07-14 RX ADMIN — BUPIVACAINE HYDROCHLORIDE 75 MG: 2.5 INJECTION, SOLUTION EPIDURAL; INFILTRATION; INTRACAUDAL at 20:41

## 2017-07-14 RX ADMIN — LORAZEPAM 1 MG: 2 INJECTION INTRAMUSCULAR; INTRAVENOUS at 20:02

## 2017-07-14 RX ADMIN — FENTANYL CITRATE 100 MCG: 50 INJECTION, SOLUTION INTRAMUSCULAR; INTRAVENOUS at 19:38

## 2017-07-14 RX ADMIN — FENTANYL CITRATE 100 MCG: 50 INJECTION, SOLUTION INTRAMUSCULAR; INTRAVENOUS at 20:43

## 2017-07-14 NOTE — ED AVS SNAPSHOT
[facility] Emergency Department    [address] [place] [address]    Phone:  [phone]    Fax:  [phone]                                       Quan Murphy   MRN: [id number]    Department:  [facility] Emergency Department   Date of Visit:  7/14/2017           After Visit Summary Signature Page     I have received my discharge instructions, and my questions have been answered. I have discussed any challenges I see with this plan with the nurse or doctor.    ..........................................................................................................................................  Patient/Patient Representative Signature      ..........................................................................................................................................  Patient Representative Print Name and Relationship to Patient    ..................................................               ................................................  Date                                            Time    ..........................................................................................................................................  Reviewed by Signature/Title    ...................................................              ..............................................  Date                                                            Time

## 2017-07-14 NOTE — ED AVS SNAPSHOT
[facility] Emergency Department    [address]     [place] [address]    Phone:  [phone]    Fax:  [phone]                                       Quan Murphy   MRN: [id number]    Department:  [facility] Emergency Department   Date of Visit:  7/14/2017           Patient Information     Date Of Birth          1950        Your diagnoses for this visit were:     Closed Colles' fracture of right radius, initial encounter        You were seen by Von Dangelo MD.      Follow-up Information     Follow up with Pedro Blanca DO On 7/17/2017.    Specialty:  Orthopedics    Why:  as scheduled in [facility]    Contact information:    [address] [place]            [place] [address]  [phone]          Discharge Instructions       See Dr. Blanca in the CLINIC on Monday as scheduled.  Elevate your right arm above heart level as much as possible to help decrease the swelling.  Splint/sling until seen by orthopedics.  If the splint is too tight, you can loosen the Ace wraps and reapply them.  The splint should hold its shape.  You may use either the Norco or oxycodone as needed for pain.  Use one or the other, not both.  It was nice visiting with both of you again tonight.   I hope you heal quickly.    Thank you for choosing [facility]. We appreciate the opportunity to meet your urgent medical needs. Please let us know if we could have done anything to make your stay more satisfying.    After discharge, please closely monitor for any new or worsening symptoms. Return to the Emergency Department if you develop any acute worsening signs or symptoms.    If you received an opiate pain medication or sedative during your visit, please do not drive for at least 8 hours.     If you had lab work, cultures or imaging studies done during your stay, the final results may still be pending. We will call you if your plan  of care needs to change. However, if you are not improving as expected, please follow up with your primary care provider or clinic.     Start any prescription medications that were prescribed to you and take them as directed.     Please see additional handouts that may be pertinent to your condition.         Future Appointments        Provider Department Dept Phone Center    7/17/2017 8:30 AM Pedro Blanca, DO [facility] [phone] [facility]      24 Hour Appointment Hotline       To make an appointment at any [facility], call [phone] ([phone]). If you don't have a family doctor or clinic, we will help you find one. [facility] are conveniently located to serve the needs of you and your family.             Review of your medicines      START taking        Dose / Directions Last dose taken    oxyCODONE 5 MG IR tablet   Commonly known as:  ROXICODONE   Dose:  5-10 mg   Quantity:  20 tablet        Take 1-2 tablets (5-10 mg) by mouth every 4 hours as needed for moderate to severe pain   Refills:  0          Our records show that you are taking the medicines listed below. If these are incorrect, please call your family doctor or clinic.        Dose / Directions Last dose taken    ALPRAZolam 0.5 MG tablet   Commonly known as:  XANAX   Dose:  0.5 mg   Quantity:  90 tablet        Take 1 tablet (0.5 mg) by mouth 3 times daily as needed for anxiety   Refills:  0        aspirin 81 MG EC tablet   Dose:  81 mg   Quantity:  90 tablet        Take 1 tablet (81 mg) by mouth daily   Refills:  3        cetirizine 10 MG tablet   Commonly known as:  zyrTEC   Dose:  10 mg        Take 10 mg by mouth daily   Refills:  0        CIALIS 20 MG tablet   Dose:  20 mg   Generic drug:  tadalafil        Take 20 mg by mouth daily as needed   Refills:  0        citalopram 40 MG tablet   Commonly known as:  celeXA   Dose:  40 mg   Quantity:  30 tablet        Take 1 tablet (40 mg) by mouth daily    Refills:  6        clopidogrel 75 MG tablet   Commonly known as:  PLAVIX   Dose:  75 mg   Quantity:  90 tablet        Take 1 tablet (75 mg) by mouth daily   Refills:  3        HYDROcodone-acetaminophen 7.5-325 MG per tablet   Commonly known as:  NORCO   Dose:  1 tablet   Quantity:  100 tablet        Take 1 tablet by mouth 4 times daily as needed for moderate to severe pain   Refills:  0        nitroGLYcerin 0.4 MG sublingual tablet   Commonly known as:  NITROSTAT   Quantity:  25 tablet        For chest pain place 1 tablet under the tongue every 5 minutes for 3 doses. If symptoms persist 5 minutes after 1st dose call 911.   Refills:  0        rosuvastatin 40 MG tablet   Commonly known as:  CRESTOR   Dose:  40 mg   Quantity:  90 tablet        Take 1 tablet (40 mg) by mouth daily   Refills:  3        zolpidem 10 MG tablet   Commonly known as:  AMBIEN   Dose:  10 mg   Quantity:  30 tablet        Take 1 tablet (10 mg) by mouth nightly as needed for sleep   Refills:  5                Prescriptions were sent or printed at these locations (1 Prescription)                   [facility]   [address] [address]    Telephone:  [phone]   Fax:  [phone]   Hours:                  Printed at Department/Unit printer (1 of 1)         oxyCODONE (ROXICODONE) 5 MG IR tablet                Procedures and tests performed during your visit     Basic metabolic panel    CBC with platelets differential    POC US ABDOMEN LIMITED (FAST/RUQ)    Peripheral IV catheter    Wrist XR, G/E 3 views, right    XR Wrist Port Right 2 Views      Orders Needing Specimen Collection     None      Pending Results     No orders found from 7/12/2017 to 7/15/2017.            Pending Culture Results     No orders found from 7/12/2017 to 7/15/2017.            Pending Results Instructions     If you had any lab results that were not finalized at the time of your Discharge, you can call the ED Lab Result RN at  [phone]. You will be contacted by this team for any positive Lab results or changes in treatment. The nurses are available 7 days a week from 10A to 6:30P.  You can leave a message 24 hours per day and they will return your call.        Thank you for choosing [facility]       Thank you for choosing [facility] for your care. Our goal is always to provide you with excellent care. Hearing back from our patients is one way we can continue to improve our services. Please take a few minutes to complete the written survey that you may receive in the mail after you visit with us. Thank you!        [employer] Information     [employer] gives you secure access to your electronic health record. If you see a primary care provider, you can also send messages to your care team and make appointments. If you have questions, please call your primary care clinic.  If you do not have a primary care provider, please call [phone] and they will assist you.        Care EveryWhere ID     This is your Care EveryWhere ID. This could be used by other organizations to access your [facility] medical records  [id number]        Equal Access to Services     MAGGIE HARRIS : Hadsaúl Aly, waerikda lujudith, qaybta kaaldeanna onofre, mitchell cox. [facility] [phone].    ATENCIÓN: Si habla español, tiene a recinos disposición servicios gratuitos de asistencia lingüística. Llame al [phone].    We comply with applicable federal civil rights laws and [place] laws. We do not discriminate on the basis of race, color, national origin, age, disability sex, sexual orientation or gender identity.            After Visit Summary       This is your record. Keep this with you and show to your community pharmacist(s) and doctor(s) at your next visit.

## 2017-07-15 NOTE — ED PROVIDER NOTES
[facility] Trauma Record    Level of trauma activation: Trauma Evaluation  MD to beside at: upon arrival     Chief Complaint:    Chief Complaint   Patient presents with     Fall       History of Present Illness: Quan Murphy is a 66 year old male who was brought by private car from home after falling off of a ladder, about 8 feet to the ground. Patient avoided hitting his head on the ground but landed on his right wrist, causing obvious deformity. Patient is also suffered abrasions to his left elbow and left shoulder blade. Patient rates his pain 7/10. Patient walked back to the house after his fall. Patient is breathing well. He states his abdomen is fine.     Patient denies syncope, head trauma, neck pain, and tobacco or street drug use. Patient had a stent placed in his heart last year and he has been taking Plavix ever since. He has been in good health and exercises. Patient does state that he had a couple beers this afternoon but he thinks that that wasn't the reason why he fell.    Prehospital interventions:    Respiratory Support: None   Spinal precautions: NA   Medications: non   Other: N/A    C-collar placement: Not indicated    Spine board placement: Not indicated      EPIC Medication List reviewed.  Additional Reported Medications:   Intoxicants:  Alcohol  Past History:  Problem List:   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, insomnia type     Allergic conjunctivitis     Anxiety     Hyperlipidemia LDL goal <130     Hypertension goal BP (blood pressure) < 140/90     Advanced directives, counseling/discussion     Inguinal hernia     Degenerative arthritis of foot     Hallux rigidus     Coronary atherosclerosis     Arthropathy     Chest pain     Status post  "insertion of drug-eluting stent into left anterior descending artery for coronary artery disease     Medical:   has a past medical history of Allergy, unspecified not elsewhere classified; Coronary atherosclerosis of unspecified type of vessel, native or graft; and Malignant neoplasm of prostate (H).  Surgical:   has a past surgical history that includes LAPAROSCOPY, SURGICAL PROSTATECTOMY, RETROPUBIC RADICAL, W/NERVE SPARING (11/30/2004); CORRECT BUNION,SIMPLE (08/11/2005); REMV TOE BENIGN BONE LESN (08/11/2005); surgical history of - (1999/1974); surgical history of - (10/2004); surgical history of - (11/05); TOTAL KNEE ARTHROPLASTY (05/01/08); Mohs micrographic procedure (08/23/11); Herniorrhaphy inguinal (7/3/2013); Arthrodesis foot (7/23/2013); Colonoscopy (10/7/2013); Arthrodesis foot (6/10/2014); Reconstruct forefoot with metatarsophalangeal (MTP) fusion (6/10/2014); and stent, coronary, cesilia.    Social History:   reports that he has never smoked. He has never used smokeless tobacco. He reports that he drinks about 5.0 oz of alcohol per week  He reports that he does not use illicit drugs.  Family History:  family history includes Connective Tissue Disorder in his mother; HEART DISEASE in his mother; Hypertension in his father.    ROS: All other systems are reviewed and are negative     Examination:  /80  Pulse 93  Temp 97.7  F (36.5  C) (Oral)  Resp 15  Ht 1.727 m (5' 8\")  Wt 90.3 kg (199 lb)  SpO2 99%  BMI 30.26 kg/m2   Primary Survey:    Airway: Intact, Patent and Talking    Breathing: Spontaneous, Bilateral breath sounds and Non labored    Circulation: Awake and alert with normal blood pressure and normal central and peripheral perfusion.  Was initially pale and sweaty but has been working outside.  Color improved once we got him into bed.    Disability:     Pupils: EOMI PERRL      GCS:   Motor 6=Obeys commands   Verbal 5=Oriented   Eye Opening 4=Spontaneous   Total: 15       Environment & " Exposure: Patient was completely exposed and a head-to-toe visual inspection was done.     Secondary Survey:    Neurologic: Alert, oriented, and coherent.,   Motor strength intact in the upper and lower extremities on manual muscle testing except for right wrist strength not tested due to obvious deformity,   Sensation intact in the upper and lower extremities,   Cranial nerves: no cranial deficits and Reflexes intact    HEENT     Eyes: PERRL, EOMI, lids, lashes, conjunctivae and corneas normal     Head: Atraumatic normocephalic, no areas of erythema, ecchymosis, swelling, deformity or other injury     Ears: Pinnas normal. EACs clear.  TMs normal. No hemotympanum otorrhea     Nose/Sinus: No external injury. No pain or instability on palpation. Nares normal. Septum midline. No septal hematoma,     Throat/Oropharynx: Lips, tongue, and buccal mucosa intact without evidence of injury. , Teeth intact, Posterior pharynx clear. and Jaw motion normal and without trismus or jaw tendersness. No malocclusion.     Face: No areas of  erythema, ecchymosis, swelling, or deformity.    Neck/C-Spine: Able to focus attention on neck, Full, pain free active range of motion of neck and No tenderness to palpation or percussion over the midline cervical spine    Chest: External Exam - No areas of  erythema, ecchymosis, swelling, or deformity, crepitus or subcutaneous emphysema       Pulmonary: Breathing unlabored. Breath sounds clear bilaterally with good air entry and no retractions, tachypnea, or adventitious sounds.    Cardiovascular     Heart: Rhythm regular, rate normal, no murmur     Pulses: Bilateral radial normal and Bilateral DP and PT normal    Gastrointestinal:     Abdomen: Non-distended, bowel sounds active, soft, non-tender, no hepatosplenomegaly or masses. No areas of  abrasion, laceration, or ecchymosis.     Rectal: Not examined    Genitourinary: Not examined    Musculoskeletal:      Back:  No midline tenderness to  palpation or percussion over the length of the spine.     Extremities: Obvious deformity of right wrist/distal forearm.  Abrasion on left elbow.  All else normal.  Hips/pelvis stable and nontender.  Legs normal.             C-spine clearance: C-spine clear by Nexus Criteria (No posterior midline tenderness, No intoxication, Normal alertness, No neuro deficit, No significant distracting injury (able to focus attention on neck))  Time cleared: 1925   Spine clearance: Patient competent, no spine tenderness or pain - Time Spine Board Removed: NA   GCS prior to Discharge:    Motor 6=Obeys commands   Verbal 5=Oriented   Eye Opening 4=Spontaneous   Total: 15     Review of Labs/Path/Imaging:   Results for orders placed or performed during the hospital encounter of 07/14/17 (from the past 24 hour(s))   POC US ABDOMEN LIMITED (FAST/RUQ)    Impression    Bedside FAST (Focused Assessment with Sonography in Trauma), performed and interpreted by me.   Indication: Trauma    With the patient in Trendelenburg, the RUQ, LUQ and subxiphoid views were examined for intraabdominal and thoracic free fluid and pericardial effusion. With the patient in reverse Trendelenburg, the suprapubic view was examined for intraabdominal free fluid. Image quality was satisfactory..     Findings: There is no evidence of free fluid above or below bilateral diaphragms, in the splenorenal or hepatorenal space, or in bilateral paracolic gutters. There was no free fluid seen in the pelvis adjacent to the urinary bladder. There is no free fluid within the pericardium.   Extended FAST exam (eFAST):   Indication: Trauma   The chest wall was evaluated for evidence of pneumothorax.     Right side:  Lung sliding artifact  Present     Comet tail artifacts or B-lines  Absent   Left side:  Lung sliding artifact  Present     Comet tail artifacts or B-lines Absent     IMPRESSION:  Negative FAST     CBC with platelets differential   Result Value Ref Range    WBC 6.0 4.0  - 11.0 10e9/L    RBC Count 4.81 4.4 - 5.9 10e12/L    Hemoglobin 13.8 13.3 - 17.7 g/dL    Hematocrit 41.7 40.0 - 53.0 %    MCV 87 78 - 100 fl    MCH 28.7 26.5 - 33.0 pg    MCHC 33.1 31.5 - 36.5 g/dL    RDW 14.6 10.0 - 15.0 %    Platelet Count 174 150 - 450 10e9/L    Diff Method Automated Method     % Neutrophils 55.2 %    % Lymphocytes 33.9 %    % Monocytes 9.4 %    % Eosinophils 0.7 %    % Basophils 0.3 %    % Immature Granulocytes 0.5 %    Absolute Neutrophil 3.3 1.6 - 8.3 10e9/L    Absolute Lymphocytes 2.0 0.8 - 5.3 10e9/L    Absolute Monocytes 0.6 0.0 - 1.3 10e9/L    Absolute Eosinophils 0.0 0.0 - 0.7 10e9/L    Absolute Basophils 0.0 0.0 - 0.2 10e9/L    Abs Immature Granulocytes 0.0 0 - 0.4 10e9/L   Basic metabolic panel   Result Value Ref Range    Sodium 141 133 - 144 mmol/L    Potassium 3.5 3.4 - 5.3 mmol/L    Chloride 106 94 - 109 mmol/L    Carbon Dioxide 22 20 - 32 mmol/L    Anion Gap 13 3 - 14 mmol/L    Glucose 83 70 - 99 mg/dL    Urea Nitrogen 19 7 - 30 mg/dL    Creatinine 1.17 0.66 - 1.25 mg/dL    GFR Estimate 62 >60 mL/min/1.7m2    GFR Estimate If Black 75 >60 mL/min/1.7m2    Calcium 8.6 8.5 - 10.1 mg/dL   Wrist XR, G/E 3 views, right    Narrative    RIGHT WRIST THREE VIEWS   7/14/2017 7:51 PM    HISTORY: Pain    COMPARISON: None.      Impression    IMPRESSION: There is a moderately comminuted fracture of the distal  radius involving the metaphysis and epiphysis. There is mild dorsal  displacement and angulation of the distal fragment. No definite  intra-articular involvement is seen. There is a mildly displaced ulnar  styloid fracture. No other abnormality is seen.      *Note: Due to a large number of results and/or encounters for the requested time period, some results have not been displayed. A complete set of results can be found in Results Review.          ED Course: He was evaluated immediately upon arrival.  FAST exam was negative.  IV placed.  1 L normal saline started.  Right wrist x-ray ordered.   Fentanyl for pain.    Fentanyl helped take the edge off the pain.  He was given Ativan 1 mg IV for anxiety and to augment the pain control.    1954:  Call placed to ortho.  7:59 PM:  Dr Blanca returned my call and reviewed his xrays.  He suggested a hematoma block and an attempt at reducing the fracture to take some of the pressure off the nerve.  This will most likely need surgery.  Will use finger traps to aid in the reduction.    Under ultrasound guidance, 10 cc of Marcaine 0.25% plain was injected at the fracture site after cleansing the overlying skin.  He was placed in finger traps and 10# of traction applied.    He had excellent results with the hematoma block and finger trap traction.  I was able to further manipulate and reduce the fracture.  Post reduction film was taken while still in the finger traps.  Alignment is much improved.  He was placed in a sugar tong splint.    Splinting:    Splint applied to: Right forearm/wrist.   Type of splint:  Sugar tong   Distal neurovascular function intact pre-splint: Yes   Distal neurovascular function intact post-splint: Yes, full movement but he has some tingling of the distal index and middle fingers, suspect from the finger traps rather than neuro deficit.      Impression:  (K99.119U) Closed Colles' fracture of right radius, initial encounter    Comment: closed, s/p reduction and splinting.    Plan:   oxyCODONE (ROXICODONE) 5 MG IR tablet  He will see Dr. Blanca on Monday, 7/17/17, in the [facility].    0 mins of critical care time, not including procedures, spent directly at patient s bedside, reviewing records, and coordinating care with consultants.    This document serves as a record of services personally performed by Von Dangelo MD. It was created on their behalf by Nathaniel Beckett, a trained medical scribe. The creation of this record is based on the provider's personal observations and the statements of the patient. This document has  been checked and approved by the attending provider.       Von Dangelo MD  07/14/17 2243

## 2017-07-15 NOTE — ED NOTES
Patient placed in Chinese Finger Trap by MD.  Stated to wait and see how trap worked for pain control before further analgesia.

## 2017-07-15 NOTE — ED NOTES
Patient reports tingling right Index and Middle Finger after removal from finger trap - Dr. Dangelo notified and came to room to examine.

## 2017-07-15 NOTE — ED NOTES
Trauma eval called and pt brought to room 6 after 8 foot fall from ladder while outside trimming trees.  Deformity to right wrist, abrasions to left elbow and fell onto tailbone.  Denies LOC, pale and diaphoretic.  Dr. Rios at bedside with U/S

## 2017-07-15 NOTE — DISCHARGE INSTRUCTIONS
See Dr. Blanca in the CLINIC on Monday as scheduled.  Elevate your right arm above heart level as much as possible to help decrease the swelling.  Splint/sling until seen by orthopedics.  If the splint is too tight, you can loosen the Ace wraps and reapply them.  The splint should hold its shape.  You may use either the Norco or oxycodone as needed for pain.  Use one or the other, not both.  It was nice visiting with both of you again tonight.   I hope you heal quickly.    Thank you for choosing [facility]. We appreciate the opportunity to meet your urgent medical needs. Please let us know if we could have done anything to make your stay more satisfying.    After discharge, please closely monitor for any new or worsening symptoms. Return to the Emergency Department if you develop any acute worsening signs or symptoms.    If you received an opiate pain medication or sedative during your visit, please do not drive for at least 8 hours.     If you had lab work, cultures or imaging studies done during your stay, the final results may still be pending. We will call you if your plan of care needs to change. However, if you are not improving as expected, please follow up with your primary care provider or clinic.     Start any prescription medications that were prescribed to you and take them as directed.     Please see additional handouts that may be pertinent to your condition.

## 2017-07-17 ENCOUNTER — OFFICE VISIT (OUTPATIENT)
Dept: FAMILY MEDICINE | Facility: OTHER | Age: 67
End: 2017-07-17
Payer: MEDICARE

## 2017-07-17 ENCOUNTER — TELEPHONE (OUTPATIENT)
Dept: ORTHOPEDICS | Facility: OTHER | Age: 67
End: 2017-07-17

## 2017-07-17 ENCOUNTER — TRANSFERRED RECORDS (OUTPATIENT)
Dept: HEALTH INFORMATION MANAGEMENT | Facility: CLINIC | Age: 67
End: 2017-07-17

## 2017-07-17 ENCOUNTER — OFFICE VISIT (OUTPATIENT)
Dept: ORTHOPEDICS | Facility: OTHER | Age: 67
End: 2017-07-17
Payer: MEDICARE

## 2017-07-17 VITALS
TEMPERATURE: 97.1 F | HEART RATE: 68 BPM | WEIGHT: 204 LBS | RESPIRATION RATE: 12 BRPM | SYSTOLIC BLOOD PRESSURE: 118 MMHG | HEIGHT: 68 IN | DIASTOLIC BLOOD PRESSURE: 80 MMHG | BODY MASS INDEX: 30.92 KG/M2

## 2017-07-17 VITALS — WEIGHT: 204 LBS | BODY MASS INDEX: 30.92 KG/M2 | TEMPERATURE: 97.1 F | HEIGHT: 68 IN

## 2017-07-17 DIAGNOSIS — S52.531A CLOSED COLLES' FRACTURE OF RIGHT RADIUS, INITIAL ENCOUNTER: Primary | ICD-10-CM

## 2017-07-17 DIAGNOSIS — Z01.818 PREOP GENERAL PHYSICAL EXAM: Primary | ICD-10-CM

## 2017-07-17 LAB
CREAT SERPL-MCNC: 1.24 MG/DL (ref 0.66–1.25)
GFR SERPL CREATININE-BSD FRML MDRD: 58 ML/MIN/1.7M2
HGB BLD-MCNC: 13.1 G/DL (ref 13.3–17.7)
POTASSIUM SERPL-SCNC: 4.3 MMOL/L (ref 3.4–5.3)

## 2017-07-17 PROCEDURE — 84132 ASSAY OF SERUM POTASSIUM: CPT | Performed by: STUDENT IN AN ORGANIZED HEALTH CARE EDUCATION/TRAINING PROGRAM

## 2017-07-17 PROCEDURE — 99203 OFFICE O/P NEW LOW 30 MIN: CPT | Mod: 57 | Performed by: ORTHOPAEDIC SURGERY

## 2017-07-17 PROCEDURE — 93000 ELECTROCARDIOGRAM COMPLETE: CPT | Performed by: STUDENT IN AN ORGANIZED HEALTH CARE EDUCATION/TRAINING PROGRAM

## 2017-07-17 PROCEDURE — 82565 ASSAY OF CREATININE: CPT | Performed by: STUDENT IN AN ORGANIZED HEALTH CARE EDUCATION/TRAINING PROGRAM

## 2017-07-17 PROCEDURE — 85018 HEMOGLOBIN: CPT | Performed by: STUDENT IN AN ORGANIZED HEALTH CARE EDUCATION/TRAINING PROGRAM

## 2017-07-17 PROCEDURE — 36415 COLL VENOUS BLD VENIPUNCTURE: CPT | Performed by: STUDENT IN AN ORGANIZED HEALTH CARE EDUCATION/TRAINING PROGRAM

## 2017-07-17 PROCEDURE — 99214 OFFICE O/P EST MOD 30 MIN: CPT | Performed by: STUDENT IN AN ORGANIZED HEALTH CARE EDUCATION/TRAINING PROGRAM

## 2017-07-17 RX ORDER — OXYCODONE HYDROCHLORIDE 5 MG/1
5-10 CAPSULE ORAL EVERY 4 HOURS PRN
Qty: 60 CAPSULE | Refills: 0 | Status: SHIPPED | OUTPATIENT
Start: 2017-07-17 | End: 2017-07-27

## 2017-07-17 RX ORDER — HYDROXYZINE PAMOATE 25 MG/1
25 CAPSULE ORAL 3 TIMES DAILY PRN
Qty: 60 CAPSULE | Refills: 0 | Status: SHIPPED | OUTPATIENT
Start: 2017-07-17 | End: 2017-08-03

## 2017-07-17 ASSESSMENT — PAIN SCALES - GENERAL
PAINLEVEL: MODERATE PAIN (5)
PAINLEVEL: MODERATE PAIN (5)

## 2017-07-17 NOTE — NURSING NOTE
"Chief Complaint   Patient presents with     Musculoskeletal Problem     Right wrist injury- DOI: 7/14/17- fell off ladder     Consult     ref: ED       Initial Temp 97.1  F (36.2  C) (Temporal)  Ht 5' 8\" (1.727 m)  Wt 204 lb (92.5 kg)  BMI 31.02 kg/m2 Estimated body mass index is 31.02 kg/(m^2) as calculated from the following:    Height as of this encounter: 5' 8\" (1.727 m).    Weight as of this encounter: 204 lb (92.5 kg).  Medication Reconciliation: complete     Danni Vidal CMA        "

## 2017-07-17 NOTE — NURSING NOTE
"Chief Complaint   Patient presents with     Pre-Op Exam       Initial /80 (BP Location: Left arm, Patient Position: Chair, Cuff Size: Adult Regular)  Pulse 68  Temp 97.1  F (36.2  C) (Temporal)  Resp 12  Ht 5' 7.99\" (1.727 m)  Wt 204 lb (92.5 kg)  BMI 31.03 kg/m2 Estimated body mass index is 31.03 kg/(m^2) as calculated from the following:    Height as of this encounter: 5' 7.99\" (1.727 m).    Weight as of this encounter: 204 lb (92.5 kg).  Medication Reconciliation: complete   Sarahi Heard CMA      "

## 2017-07-17 NOTE — PROGRESS NOTES
ORTHOPEDIC CONSULT      Chief Complaint: Quan Murphy is a 66 year old male who is being seen for Chief Complaint   Patient presents with     Musculoskeletal Problem     Right wrist injury- DOI: 7/14/17- fell off ladder     Consult     ref: ED       History of Present Illness:   Mechanism of Injury: Fell off a ladder approximately 4 feet in the air on an outstretched arm. He was trimming trees.  Location: right wrist   Duration of Pain:  Date of injury: July 14, 2017  Rating of Pain:  moderate.    Pain Quality: aching and sharp  Pain is better with: Rest  Pain is worse with:  flexion, extension, rotation   Treatment so far consists of:  ER evaluation-closed reduction and splinting. Oxycodone and Vistaril.   Associated Features: None  Prior history of related problems:   No previous problem in the affected area.  Presents with his wife            Patient's past medical, surgical, social and family histories reviewed.     Past Medical History:   Diagnosis Date     Allergy, unspecified not elsewhere classified     Seasonal allergies, pollen, dust, smoke and animals     Coronary atherosclerosis of unspecified type of vessel, native or graft     Coronary artery disease     Malignant neoplasm of prostate (H)     Prostate cancer       Past Surgical History:   Procedure Laterality Date     ARTHRODESIS FOOT  7/23/2013    Procedure: ARTHRODESIS FOOT;  Great Toe Arthrodesis Left Foot;  Surgeon: Ash Gonzalez DPM;  [facility]     ARTHRODESIS FOOT  6/10/2014    Procedure: ARTHRODESIS FOOT;  Surgeon: Ash Gonzalez DPM;  Location: [facility]     C LAPAROSCOPY, SURGICAL PROSTATECTOMY, RETROPUBIC RADICAL, W/NERVE SPARING  11/30/2004    With full bilateral pelvic lymphadenectomy.  F-[facility].     C TOTAL KNEE ARTHROPLASTY  05/01/08    Left knee     COLONOSCOPY  10/7/2013    Procedure: COLONOSCOPY;  Colonoscopy;  Surgeon: Mike Fallon MD;  Location:  GI     HC CORRECT BUNION,SIMPLE  08/11/2005    x3     HC REMV TOE  BENIGN BONE LESN  08/11/2005     HERNIORRHAPHY INGUINAL  7/3/2013    Procedure: HERNIORRHAPHY INGUINAL;  Open Repair Inguinal hernia Right with mesh ;  Surgeon: Sam Escobar MD;  Location: [facility]     MOHS MICROGRAPHIC PROCEDURE  08/23/11    ear and chin-[facility]     RECONSTRUCT FOREFOOT WITH METATARSOPHALANGEAL (MTP) FUSION  6/10/2014    Procedure: RECONSTRUCT FOREFOOT WITH METATARSOPHALANGEAL (MTP) FUSION;  Surgeon: Ash Gonzalez DPM;  Location: [facility]     STENT, CORONARY, DEMI       SURGICAL HISTORY OF -   1999/1974    lt knee     SURGICAL HISTORY OF -   10/2004    lithotripsy     SURGICAL HISTORY OF - 11/05    angiogram with stent       Medications:    Current Outpatient Prescriptions on File Prior to Visit:  oxyCODONE (ROXICODONE) 5 MG IR tablet Take 1-2 tablets (5-10 mg) by mouth every 4 hours as needed for moderate to severe pain   ALPRAZolam (XANAX) 0.5 MG tablet Take 1 tablet (0.5 mg) by mouth 3 times daily as needed for anxiety   HYDROcodone-acetaminophen (NORCO) 7.5-325 MG per tablet Take 1 tablet by mouth 4 times daily as needed for moderate to severe pain   zolpidem (AMBIEN) 10 MG tablet Take 1 tablet (10 mg) by mouth nightly as needed for sleep   rosuvastatin (CRESTOR) 40 MG tablet Take 1 tablet (40 mg) by mouth daily   clopidogrel (PLAVIX) 75 MG tablet Take 1 tablet (75 mg) by mouth daily   aspirin 81 MG EC tablet Take 1 tablet (81 mg) by mouth daily   nitroglycerin (NITROSTAT) 0.4 MG sublingual tablet For chest pain place 1 tablet under the tongue every 5 minutes for 3 doses. If symptoms persist 5 minutes after 1st dose call 911.   citalopram (CELEXA) 40 MG tablet Take 1 tablet (40 mg) by mouth daily   cetirizine (ZYRTEC) 10 MG tablet Take 10 mg by mouth daily   CIALIS 20 MG tablet Take 20 mg by mouth daily as needed      No current facility-administered medications on file prior to visit.     Allergies   Allergen Reactions     Animal Dander      Dust Mites      Pollen Extract  "     Smoke.        Social History     Occupational History     Not on file.     Social History Main Topics     Smoking status: Never Smoker     Smokeless tobacco: Never Used     Alcohol use 5.0 oz/week     10 Cans of beer per week      Comment: occasional      Drug use: No     Sexual activity: Yes     Partners: Female       Family History   Problem Relation Age of Onset     Hypertension Father      has had MI      Connective Tissue Disorder Mother      LUPUS     HEART DISEASE Mother      poor valve-needing replacement       REVIEW OF SYSTEMS  10 point review systems performed otherwise negative as noted as per history of present illness.    Physical Exam:  Vitals: Temp 97.1  F (36.2  C) (Temporal)  Ht 5' 8\" (1.727 m)  Wt 204 lb (92.5 kg)  BMI 31.02 kg/m2  BMI= Body mass index is 31.02 kg/(m^2).  Constitutional: healthy, alert and no acute distress   Psychiatric: mentation appears normal and affect normal/bright  NEURO: no focal deficits  RESP: Normal with easy respirations and no use of accessory muscles to breathe, no audible wheezing or retractions  CV: RUE:  hand only-  non-pitting edema         Regular rate and rhythm by palpation  SKIN: No erythema, rashes, excoriation, or breakdown. No evidence of infection. , Sugar tong splint in place. Skin edges intact.  JOINT/EXTREMITIES:right wrist/forearm: No tenderness surrounding the elbow. Fingers are warm and dry with good cap refill. Some minimal swelling into the fingers. Flexors and extensors are grossly intact.     GAIT: not tested     Diagnostic Modalities:  right wrist X-ray: Initial radiographs show comminuted distal radius fracture. Approximate 40-45  apex volar angulation with comminution along the dorsal margin. Small ulnar styloid fracture.  right wrist X-ray: Much improved alignment. Continued dorsal comminution. Translated approximately 20% dorsal  Independent visualization of the images was performed.      Impression: right comminuted displaced " distal radius fracture with smaller styloid fracture    Plan:  All of the above pertinent physical exam and imaging modalities findings was reviewed with Quan and his wife.    Given the nature of the fracture and the risk for malunion, nonunion, loss of motion, and the need for prolonged immobilization causing loss of motion or stiffness, I have recommend open reduction and internal fixation with a plate and screws. I discussed risks, benefits, complication, limitations, and anticipated post-operative course. Risks such as infection, bleeding, non-union, malunion, hardware failure, need for hardware removal, damage to nerves/vessles, scar, scar sensitivity, wound problems, stiffness and loss of motion was discussed. The patient verbalized an understanding of this conversation.  I have recommend this procedure so the patient will  be able to return back to pre-injury function. We also discussed conservative care including casting. I discussed my concern around his dorsal comminution and the likelihood of loss of reduction.    Restrictions: No use of affected extremity    He is on Plavix. He had a stent placed in December. I recommended he talk with his family doctor before discontinuing it.  He will need a history and physical.  Oxycodone and Vistaril prescription provided today.  Return to clinic 10 days post-operatively. , or sooner as needed for changes.  Re-x-ray on return: Yes.    Amor Blanca D.O.    Please schedule for surgery, pre op H&P, and post ops.      Patient Name:  Quan Murphy ([id number]).  :  1950  Gender:  male  Patient Type:  Same Day Surgery  Surgeon:  Pedro Blanca DO  Physician requests assist from:  PA    Procedures:    Right distal radius open reduction and internal fixation   Approach:  NA  Diagnosis:  Closed Colles' fracture of right radius  C-arm:  No  Mini C-arm:   Yes  Pathology Scheduled:  No  Special instruments/supplies:  [employer]  Vendor Rep:   Yes  Anesthesia:  General  Block:  No   Block type:   Time needed:  90 minutes    [facility] Discussed:  Not Applicable    Post op 1:

## 2017-07-17 NOTE — PROGRESS NOTES
[address]  [facility] [address]  [phone]  Dept: [phone]    PRE-OP EVALUATION:  Today's date: 2017    Quan Murphy (: 1950) presents for pre-operative evaluation assessment as requested by Dr. Blanca.  He requires evaluation and anesthesia risk assessment prior to undergoing surgery/procedure for treatment of right wrist fracture .  Proposed procedure: ORIF of right radius and styloid fracture    Date of Surgery/ Procedure: 2017  Time of Surgery/ Procedure: afternoon  Hospital/Surgical Facility: [facility]    Primary Physician: Jose Hernandez  Type of Anesthesia Anticipated: to be determined    Patient has a Health Care Directive or Living Will:  YES     1. YES - Do you have a history of heart attack, stroke, stent, bypass or surgery on an artery in the head, neck, heart or legs? History of drug-eluting stent of the LAD, on Plavix  2. NO - Do you ever have any pain or discomfort in your chest?  3. NO - Do you have a history of  Heart Failure?  4. NO - Are you troubled by shortness of breath when: walking on the level, up a slight hill or at night?  5. NO - Do you currently have a cold, bronchitis or other respiratory infection?  6. NO - Do you have a cough, shortness of breath or wheezing?  7. NO - Do you sometimes get pains in the calves of your legs when you walk?  8. NO - Do you or anyone in your family have previous history of blood clots?  9. YES - Do you or does anyone in your family have a serious bleeding problem such as prolonged bleeding following surgeries or cuts? Is on Plavix, no history of bleeding/clotting disorder  10. NO - Have you ever had problems with anemia or been told to take iron pills?  11. NO - Have you had any abnormal blood loss such as black, tarry or bloody stools, or abnormal vaginal bleeding?  12. YES - Have you ever had a blood transfusion? Radical prostatectomy with autologous transfusion  13. NO -  Have you or any of your relatives ever had problems with anesthesia?  14. NO - Do you have sleep apnea, excessive snoring or daytime drowsiness?  15. NO - Do you have any prosthetic heart valves?  16. YES - Do you have prosthetic joints? Left knee replacement, fused great toe bilaterally  17. NO - Is there any chance that you may be pregnant?      HPI:                                                      Brief HPI related to upcoming procedure: fall from ladder resulting in Colles fracture of right radius with smaller styloid fracture    See problem list for active medical problems.  Problems all longstanding and stable, except as noted/documented.  See ROS for pertinent symptoms related to these conditions.                                                                                                  .    MEDICAL HISTORY:                                                      Patient Active Problem List    Diagnosis Date Noted     Status post insertion of drug-eluting stent into left anterior descending artery for coronary artery disease 01/05/2017     Priority: Medium     Chest pain 12/24/2016     Priority: Medium     Arthropathy 02/04/2014     Priority: Medium     Problem list name updated by automated process. Provider to review       Coronary atherosclerosis      Priority: Medium     Coronary artery disease  Problem list name updated by automated process. Provider to review       Hallux rigidus 07/16/2013     Priority: Medium     Inguinal hernia 06/28/2013     Priority: Medium     Degenerative arthritis of foot 06/28/2013     Priority: Medium     Advanced directives, counseling/discussion 05/24/2013     Priority: Medium     Advance Care Planning:   Receipt of ACP document:  Received: Health Care Directive which was witnessed or notarized on 8-18-08.  Document previously scanned on 7-8-13.  Validation form completed and sent to be scanned.  Code Status needs to be updated to reflect choices in most recent ACP  document. Confirmed/documented designated decision maker(s). See permanent comments section of demographics in clinical tab. View document(s) and details by clicking on code status.   Added by Vandana Platt RN, System ACP Coordinator on 7/22/2013.    Advance Care Planning:   ACP Review and Resources Provided:  Reviewed chart for advance care plan.  Quan Murphy has no plan or code status on file however states presence of ACP document. Copy requested. Confirmed code status reflects current choices pending receipt of document/advance care plan review. Confirmed/documented designated decision maker(s). See permanent comments section of demographics in clinical tab.   Added by Krysten Jasmine on 5/24/2013             Hypertension goal BP (blood pressure) < 140/90 06/12/2012     Priority: Medium     Hyperlipidemia LDL goal <130 12/22/2011     Priority: Medium     Anxiety 11/02/2011     Priority: Medium     Allergic conjunctivitis 07/08/2008     Priority: Medium     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, insomnia type 11/17/2006     Priority: Medium     Problem list name updated by automated process. Provider to review       Acute reaction to stress 01/12/2005     Priority: Medium     Problem list name updated by automated process. Provider to review       Chronic maxillary sinusitis 01/12/2005     Priority: Medium     Contracture of palmar fascia 01/12/2005     Priority: Medium     Benign neoplasm of skin of other and unspecified parts of face 01/12/2005     Priority: Medium     Malignant neoplasm of prostate (H) 11/26/2004     Priority: Medium      Past Medical History:   Diagnosis Date     Allergy, unspecified not elsewhere classified     Seasonal allergies, pollen, dust, smoke and animals     Coronary atherosclerosis of unspecified type of vessel, native or graft     Coronary artery disease     Malignant neoplasm of prostate (H)     Prostate cancer     Past Surgical History:   Procedure Laterality Date      ARTHRODESIS FOOT  7/23/2013    Procedure: ARTHRODESIS FOOT;  Great Toe Arthrodesis Left Foot;  Surgeon: Ash Gonzalez DPM;  [facility]     ARTHRODESIS FOOT  6/10/2014    Procedure: ARTHRODESIS FOOT;  Surgeon: Ash Gonzalez DPM;  Location: [facility]     C LAPAROSCOPY, SURGICAL PROSTATECTOMY, RETROPUBIC RADICAL, W/NERVE SPARING  11/30/2004    With full bilateral pelvic lymphadenectomy.  F-[facility].     C TOTAL KNEE ARTHROPLASTY  05/01/08    Left knee     COLONOSCOPY  10/7/2013    Procedure: COLONOSCOPY;  Colonoscopy;  Surgeon: Mike Fallon MD;  Location: [facility]     HC CORRECT BUNION,SIMPLE  08/11/2005    x3     HC REMV TOE BENIGN BONE LESN  08/11/2005     HERNIORRHAPHY INGUINAL  7/3/2013    Procedure: HERNIORRHAPHY INGUINAL;  Open Repair Inguinal hernia Right with mesh ;  Surgeon: Sam Escobar MD;  Location: [facility]     MOHS MICROGRAPHIC PROCEDURE  08/23/11    ear and chin-[facility]     RECONSTRUCT FOREFOOT WITH METATARSOPHALANGEAL (MTP) FUSION  6/10/2014    Procedure: RECONSTRUCT FOREFOOT WITH METATARSOPHALANGEAL (MTP) FUSION;  Surgeon: Ash Gonzalez DPM;  Location: [facility]     STENT, CORONARY, DEMI       SURGICAL HISTORY OF -   1999/1974    lt knee     SURGICAL HISTORY OF -   10/2004    lithotripsy     SURGICAL HISTORY OF - 11/05    angiogram with stent     Current Outpatient Prescriptions   Medication Sig Dispense Refill     oxyCODONE (OXY-IR) 5 MG capsule Take 1-2 capsules (5-10 mg) by mouth every 4 hours as needed for moderate to severe pain 60 capsule 0     hydrOXYzine (VISTARIL) 25 MG capsule Take 1 capsule (25 mg) by mouth 3 times daily as needed for itching 60 capsule 0     oxyCODONE (ROXICODONE) 5 MG IR tablet Take 1-2 tablets (5-10 mg) by mouth every 4 hours as needed for moderate to severe pain 20 tablet 0     ALPRAZolam (XANAX) 0.5 MG tablet Take 1 tablet (0.5 mg) by mouth 3 times daily as needed for anxiety 90 tablet 0     HYDROcodone-acetaminophen (NORCO) 7.5-325 MG per  tablet Take 1 tablet by mouth 4 times daily as needed for moderate to severe pain 100 tablet 0     zolpidem (AMBIEN) 10 MG tablet Take 1 tablet (10 mg) by mouth nightly as needed for sleep 30 tablet 5     rosuvastatin (CRESTOR) 40 MG tablet Take 1 tablet (40 mg) by mouth daily 90 tablet 3     clopidogrel (PLAVIX) 75 MG tablet Take 1 tablet (75 mg) by mouth daily 90 tablet 3     aspirin 81 MG EC tablet Take 1 tablet (81 mg) by mouth daily 90 tablet 3     nitroglycerin (NITROSTAT) 0.4 MG sublingual tablet For chest pain place 1 tablet under the tongue every 5 minutes for 3 doses. If symptoms persist 5 minutes after 1st dose call 911. 25 tablet 0     citalopram (CELEXA) 40 MG tablet Take 1 tablet (40 mg) by mouth daily 30 tablet 6     cetirizine (ZYRTEC) 10 MG tablet Take 10 mg by mouth daily       CIALIS 20 MG tablet Take 20 mg by mouth daily as needed        OTC products: Aleve, baby Aspirin    Allergies   Allergen Reactions     Animal Dander      Dust Mites      Pollen Extract      Smoke.       Latex Allergy: NO    Social History   Substance Use Topics     Smoking status: Never Smoker     Smokeless tobacco: Never Used     Alcohol use 5.0 oz/week     10 Cans of beer per week      Comment: occasional      History   Drug Use No       REVIEW OF SYSTEMS:                                                    C: NEGATIVE for fever, chills, change in weight  I: NEGATIVE for worrisome rashes, moles or lesions  E: NEGATIVE for vision changes or irritation  E/M: NEGATIVE for ear, mouth and throat problems  R: NEGATIVE for significant cough or SOB  B: NEGATIVE for masses, tenderness or discharge  CV: NEGATIVE for chest pain, palpitations or peripheral edema  GI: NEGATIVE for nausea, abdominal pain, heartburn, or change in bowel habits  : NEGATIVE for frequency, dysuria, or hematuria  M: POSITIVE for pain in right wrist/forearm NEGATIVE for significant arthralgias or myalgia  N: NEGATIVE for weakness, dizziness or  "paresthesias  E: NEGATIVE for temperature intolerance, skin/hair changes  H: NEGATIVE for bleeding problems  P: NEGATIVE for changes in mood or affect    EXAM:                                                    /80 (BP Location: Left arm, Patient Position: Chair, Cuff Size: Adult Regular)  Pulse 68  Temp 97.1  F (36.2  C) (Temporal)  Resp 12  Ht 5' 7.99\" (1.727 m)  Wt 204 lb (92.5 kg)  BMI 31.03 kg/m2    GENERAL APPEARANCE: healthy, alert and no distress     EYES: EOMI,  PERRL     HENT: ear canals and TM's normal and nose and mouth without ulcers or lesions     NECK: no adenopathy, no asymmetry, masses, or scars and thyroid normal to palpation     RESP: lungs clear to auscultation - no rales, rhonchi or wheezes     CV: regular rates and rhythm, normal S1 S2, no S3 or S4 and no murmur, click or rub     ABDOMEN:  soft, nontender, no HSM or masses and bowel sounds normal     MS: extremities normal- no gross deformities noted, no evidence of inflammation in joints, FROM in all extremities.     SKIN: no suspicious lesions or rashes     NEURO: Normal strength and tone, sensory exam grossly normal, mentation intact and speech normal     PSYCH: mentation appears normal. and affect normal/bright     LYMPHATICS: No axillary, cervical, or supraclavicular nodes    DIAGNOSTICS:                                                    EKG: appears normal, NSR, normal axis, normal intervals, no acute ST/T changes c/w ischemia, no LVH by voltage criteria, unchanged from previous tracings  Results for orders placed or performed in visit on 07/17/17   Hemoglobin   Result Value Ref Range    Hemoglobin 13.1 (L) 13.3 - 17.7 g/dL   Potassium   Result Value Ref Range    Potassium 4.3 3.4 - 5.3 mmol/L   Creatinine   Result Value Ref Range    Creatinine 1.24 0.66 - 1.25 mg/dL    GFR Estimate 58 (L) >60 mL/min/1.7m2    GFR Estimate If Black 71 >60 mL/min/1.7m2     *Note: Due to a large number of results and/or encounters for the " requested time period, some results have not been displayed. A complete set of results can be found in Results Review.       Recent Labs   Lab Test  07/14/17   1923  12/28/16   0544   HGB  13.8  14.2   PLT  174  127*   NA  141  138   POTASSIUM  3.5  4.5   CR  1.17  1.15        IMPRESSION:                                                    Reason for surgery/procedure: right comminuted displaced distal radius fracture with smaller styloid fracture  Diagnosis/reason for consult: preop    The proposed surgical procedure is considered INTERMEDIATE risk.    REVISED CARDIAC RISK INDEX  The patient has the following serious cardiovascular risks for perioperative complications such as (MI, PE, VFib and 3  AV Block):  Coronary Artery Disease (MI, positive stress test, angina, Qs on EKG)  INTERPRETATION: 1 risks: Class II (low risk - 0.9% complication rate)    The patient has the following additional risks for perioperative complications:  No identified additional risks      ICD-10-CM    1. Preop general physical exam Z01.818 EKG 12-lead complete w/read - Clinics     Hemoglobin     Potassium     Creatinine       RECOMMENDATIONS:                                                      APPROVAL GIVEN to proceed with proposed procedure, without further diagnostic evaluation       Signed Electronically by: MARCY Monroe CNP    Copy of this evaluation report is provided to requesting physician.    [facility] Preop Guidelines

## 2017-07-17 NOTE — MR AVS SNAPSHOT
After Visit Summary   7/17/2017    Quan Murphy    MRN: [id number]           Patient Information     Date Of Birth          1950        Visit Information        Provider Department      7/17/2017 8:30 AM Pedro Blanca DO [facility]        Today's Diagnoses     Closed Colles' fracture of right radius, initial encounter    -  1       Follow-ups after your visit        Your next 10 appointments already scheduled     Jul 18, 2017   [facility]    [address] [place] [address]  [place] [address] [phone]           From [address]: Exit at [address] on south side of [place]. Turn right on [address]. Turn left at stoplight on [address]. [facility] will be in view two blocks ahead            Jul 18, 2017  3:45 PM CDT   XR SURGERY DAVID LESS THAN 5 MIN FLUORO W STILLS with PHMINI   [facility])    [address] [place] [id number] [phone]           Please bring a list of your current medicines to your exam. (Include vitamins, minerals and over-thecounter medicines.) Leave your valuables at home.  Tell your doctor if there is a chance you may be pregnant.  You do not need to do anything special for this exam.            Jul 27, 2017  9:20 AM CDT   [facility]    [address] [place] [id number] [phone]              Future tests that were ordered for you today     Open Future Orders        Priority Expected Expires Ordered    XR Surgery DAVID L/T 5 Min Fluoro w Stills Routine 7/17/2017 7/17/2018 7/17/2017            Who to contact     If you have questions or need follow up information about today's clinic visit or your schedule please contact [facility] directly at  "[phone].  Normal or non-critical lab and imaging results will be communicated to you by MyChart, letter or phone within 4 business days after the clinic has received the results. If you do not hear from us within 7 days, please contact the clinic through [facility] or phone. If you have a critical or abnormal lab result, we will notify you by phone as soon as possible.  Submit refill requests through [facility] or call your pharmacy and they will forward the refill request to us. Please allow 3 business days for your refill to be completed.          Additional Information About Your Visit        [facility] Information     [facility] gives you secure access to your electronic health record. If you see a primary care provider, you can also send messages to your care team and make appointments. If you have questions, please call your primary care clinic.  If you do not have a primary care provider, please call [phone] and they will assist you.        Care EveryWhere ID     This is your Care EveryWhere ID. This could be used by other organizations to access your [facility] medical records  [id number]        Your Vitals Were     Temperature Height BMI (Body Mass Index)             97.1  F (36.2  C) (Temporal) 5' 8\" (1.727 m) 31.02 kg/m2          Blood Pressure from Last 3 Encounters:   07/17/17 118/80   07/14/17 121/83   04/04/17 (!) 86/64    Weight from Last 3 Encounters:   07/17/17 204 lb (92.5 kg)   07/17/17 204 lb (92.5 kg)   07/14/17 199 lb (90.3 kg)              Today, you had the following     No orders found for display         Today's Medication Changes          These changes are accurate as of: 7/17/17 10:13 AM.  If you have any questions, ask your nurse or doctor.               Start taking these medicines.        Dose/Directions    hydrOXYzine 25 MG capsule   Commonly known as:  VISTARIL   Used for:  Closed Colles' fracture of right radius, initial encounter   Started by:  Pedro Blanca DO        " Dose:  25 mg   Take 1 capsule (25 mg) by mouth 3 times daily as needed for itching   Quantity:  60 capsule   Refills:  0         These medicines have changed or have updated prescriptions.        Dose/Directions    * oxyCODONE 5 MG IR tablet   Commonly known as:  ROXICODONE   This may have changed:  Another medication with the same name was added. Make sure you understand how and when to take each.   Used for:  Closed Colles' fracture of right radius, initial encounter        Dose:  5-10 mg   Take 1-2 tablets (5-10 mg) by mouth every 4 hours as needed for moderate to severe pain   Quantity:  20 tablet   Refills:  0       * oxyCODONE 5 MG capsule   Commonly known as:  OXY-IR   This may have changed:  You were already taking a medication with the same name, and this prescription was added. Make sure you understand how and when to take each.   Used for:  Closed Colles' fracture of right radius, initial encounter   Changed by:  Pedro Blanca DO        Dose:  5-10 mg   Take 1-2 capsules (5-10 mg) by mouth every 4 hours as needed for moderate to severe pain   Quantity:  60 capsule   Refills:  0       * Notice:  This list has 2 medication(s) that are the same as other medications prescribed for you. Read the directions carefully, and ask your doctor or other care provider to review them with you.         Where to get your medicines      These medications were sent to [facility] [place], [place] - [address]  [address] [address]     Phone:  [phone]     hydrOXYzine 25 MG capsule         Some of these will need a paper prescription and others can be bought over the counter.  Ask your nurse if you have questions.     Bring a paper prescription for each of these medications     oxyCODONE 5 MG capsule                Primary Care Provider Office Phone # Fax #    Jose Hernandez [phone]       [place] [id number] [place]  [phone]        Equal Access to Services     [facility]: Hadii linda bragg luhking Jermain, waerikda luqadaha, qaybta kaalmamitchell prajapati. So [facility] [phone].    ATENCIÓN: Si habla español, tiene a recinos disposición servicios gratuitos de asistencia lingüística. uSsiame al [phone].    We comply with applicable federal civil rights laws and [place] laws. We do not discriminate on the basis of race, color, national origin, age, disability sex, sexual orientation or gender identity.            Thank you!     Thank you for choosing [facility]  for your care. Our goal is always to provide you with excellent care. Hearing back from our patients is one way we can continue to improve our services. Please take a few minutes to complete the written survey that you may receive in the mail after your visit with us. Thank you!             Your Updated Medication List - Protect others around you: Learn how to safely use, store and throw away your medicines at www.disposemymeds.org.          This list is accurate as of: 7/17/17 10:13 AM.  Always use your most recent med list.                   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)       cetirizine 10 MG tablet    zyrTEC     Take 10 mg by mouth daily        CIALIS 20 MG tablet   Generic drug:  tadalafil      Take 20 mg by mouth daily as needed        citalopram 40 MG tablet    celeXA    30 tablet    Take 1 tablet (40 mg) by mouth daily    Anxiety       clopidogrel 75 MG tablet    PLAVIX    90 tablet    Take 1 tablet (75 mg) by mouth daily    Coronary artery disease involving native artery of  transplanted heart with unstable angina pectoris (H)       HYDROcodone-acetaminophen 7.5-325 MG per tablet    NORCO    100 tablet    Take 1 tablet by mouth 4 times daily as needed for moderate to severe pain    Arthropathy       hydrOXYzine 25 MG capsule    VISTARIL    60 capsule    Take 1 capsule (25 mg) by mouth 3 times daily as needed for itching    Closed Colles' fracture of right radius, initial encounter       nitroGLYcerin 0.4 MG sublingual tablet    NITROSTAT    25 tablet    For chest pain place 1 tablet under the tongue every 5 minutes for 3 doses. If symptoms persist 5 minutes after 1st dose call 911.    Atherosclerosis of native coronary artery of native heart with unstable angina pectoris (H)       * oxyCODONE 5 MG IR tablet    ROXICODONE    20 tablet    Take 1-2 tablets (5-10 mg) by mouth every 4 hours as needed for moderate to severe pain    Closed Colles' fracture of right radius, initial encounter       * oxyCODONE 5 MG capsule    OXY-IR    60 capsule    Take 1-2 capsules (5-10 mg) by mouth every 4 hours as needed for moderate to severe pain    Closed Colles' fracture of right radius, initial encounter       rosuvastatin 40 MG tablet    CRESTOR    90 tablet    Take 1 tablet (40 mg) by mouth daily    Hyperlipidemia LDL goal <130       zolpidem 10 MG tablet    AMBIEN    30 tablet    Take 1 tablet (10 mg) by mouth nightly as needed for sleep    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, insomnia type       * Notice:  This list has 2 medication(s) that are the same as other medications prescribed for you. Read the directions carefully, and ask your doctor or other care provider to review them with you.

## 2017-07-17 NOTE — TELEPHONE ENCOUNTER
Surgery Scheduled    Date of Surgery 07/18/17 Time of Surgery 3:45pm  Procedure: Right Distal Radius Open Reduction & Internal Fixation  Hospital/Surgical Facility: [facility]  Surgeon: Dr Blanca  Type of Anesthesia Anticipated: General  Pre-Op: 07/17/17 with Neema Solano   Post-Op: 07/27/17 with Dr Blanca  Pre-Certification -to be completed  Consent Signed -to be completed  Hospital Stay -same day procedure    Surgery Packet (and/or) Colonscopy Prep (was given/or mailed) to patient. Patient was also instructed to arrive 1  hour(s) prior to surgery.  Patient understood and agrees to the plan.      Jennifer Coleman  Specialty

## 2017-07-17 NOTE — MR AVS SNAPSHOT
After Visit Summary   7/17/2017    Quan Murphy    MRN: [id number]           Patient Information     Date Of Birth          1950        Visit Information        Provider Department      7/17/2017 8:50 AM Neema Solano APRN CNP [facility]        Today's Diagnoses     Preop general physical exam    -  1      Care Instructions      Before Your Surgery      Call your surgeon if there is any change in your health. This includes signs of a cold or flu (such as a sore throat, runny nose, cough, rash or fever).    Do not smoke, drink alcohol or take over the counter medicine (unless your surgeon or primary care doctor tells you to) for the 24 hours before and after surgery.    If you take prescribed drugs: Follow your doctor s orders about which medicines to take and which to stop until after surgery.    Eating and drinking prior to surgery: follow the instructions from your surgeon    Take a shower or bath the night before surgery. Use the soap your surgeon gave you to gently clean your skin. If you do not have soap from your surgeon, use your regular soap. Do not shave or scrub the surgery site.  Wear clean pajamas and have clean sheets on your bed.           Follow-ups after your visit        Your next 10 appointments already scheduled     Jul 27, 2017  9:20 AM CDT   [facility]    [address] [place] [address] [phone]              Who to contact     If you have questions or need follow up information about today's clinic visit or your schedule please contact [facility] directly at [phone].  Normal or non-critical lab and imaging results will be communicated to you by MyChart, letter or phone within 4 business days after the clinic has received the results. If you do not hear from us within 7 days, please contact the clinic through  "MyChart or phone. If you have a critical or abnormal lab result, we will notify you by phone as soon as possible.  Submit refill requests through [employer] or call your pharmacy and they will forward the refill request to us. Please allow 3 business days for your refill to be completed.          Additional Information About Your Visit        [facility] Information     [facility] gives you secure access to your electronic health record. If you see a primary care provider, you can also send messages to your care team and make appointments. If you have questions, please call your primary care clinic.  If you do not have a primary care provider, please call [phone] and they will assist you.        Care EveryWhere ID     This is your Care EveryWhere ID. This could be used by other organizations to access your [facility] medical records  [id number]        Your Vitals Were     Pulse Temperature Respirations Height BMI (Body Mass Index)       68 97.1  F (36.2  C) (Temporal) 12 5' 7.99\" (1.727 m) 31.03 kg/m2        Blood Pressure from Last 3 Encounters:   07/18/17 122/70   07/17/17 118/80   07/14/17 121/83    Weight from Last 3 Encounters:   07/17/17 204 lb (92.5 kg)   07/17/17 204 lb (92.5 kg)   07/14/17 199 lb (90.3 kg)              We Performed the Following     Creatinine     EKG 12-lead complete w/read - Clinics     Hemoglobin     Potassium          Today's Medication Changes          These changes are accurate as of: 7/17/17 11:59 PM.  If you have any questions, ask your nurse or doctor.               Start taking these medicines.        Dose/Directions    hydrOXYzine 25 MG capsule   Commonly known as:  VISTARIL   Used for:  Closed Colles' fracture of right radius, initial encounter   Started by:  Pedro Blanca,         Dose:  25 mg   Take 1 capsule (25 mg) by mouth 3 times daily as needed for itching   Quantity:  60 capsule   Refills:  0         These medicines have changed or have updated prescriptions.     "    Dose/Directions    * oxyCODONE 5 MG IR tablet   Commonly known as:  ROXICODONE   This may have changed:  Another medication with the same name was added. Make sure you understand how and when to take each.   Used for:  Closed Colles' fracture of right radius, initial encounter        Dose:  5-10 mg   Take 1-2 tablets (5-10 mg) by mouth every 4 hours as needed for moderate to severe pain   Quantity:  20 tablet   Refills:  0       * oxyCODONE 5 MG capsule   Commonly known as:  OXY-IR   This may have changed:  You were already taking a medication with the same name, and this prescription was added. Make sure you understand how and when to take each.   Used for:  Closed Colles' fracture of right radius, initial encounter   Changed by:  Pedro Blanca DO        Dose:  5-10 mg   Take 1-2 capsules (5-10 mg) by mouth every 4 hours as needed for moderate to severe pain   Quantity:  60 capsule   Refills:  0       * Notice:  This list has 2 medication(s) that are the same as other medications prescribed for you. Read the directions carefully, and ask your doctor or other care provider to review them with you.         Where to get your medicines      These medications were sent to [facility] [place], [place] - [address]  [address] [address]     Phone:  [phone]     hydrOXYzine 25 MG capsule         Some of these will need a paper prescription and others can be bought over the counter.  Ask your nurse if you have questions.     Bring a paper prescription for each of these medications     oxyCODONE 5 MG capsule                Primary Care Provider Office Phone # Fax #    Jose Hernandez [phone]       [place] [address] [place] [address]        Equal Access to Services     CHERYL HARRIS AH: Ramírez Aly, angelita ortiz, karlos onofre, mitchell cox. So [facility]  [phone].    ATENCIÓN: Si garima jennings, tiene a recinos disposición servicios gratuitos de asistencia lingüística. Ramin alarcon [phone].    We comply with applicable federal civil rights laws and [place] laws. We do not discriminate on the basis of race, color, national origin, age, disability sex, sexual orientation or gender identity.            Thank you!     Thank you for choosing [facility]  for your care. Our goal is always to provide you with excellent care. Hearing back from our patients is one way we can continue to improve our services. Please take a few minutes to complete the written survey that you may receive in the mail after your visit with us. Thank you!             Your Updated Medication List - Protect others around you: Learn how to safely use, store and throw away your medicines at www.disposemymeds.org.          This list is accurate as of: 7/17/17 11:59 PM.  Always use your most recent med list.                   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)       cetirizine 10 MG tablet    zyrTEC     Take 10 mg by mouth daily        CIALIS 20 MG tablet   Generic drug:  tadalafil      Take 20 mg by mouth daily as needed        citalopram 40 MG tablet    celeXA    30 tablet    Take 1 tablet (40 mg) by mouth daily    Anxiety       clopidogrel 75 MG tablet    PLAVIX    90 tablet    Take 1 tablet (75 mg) by mouth daily    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 If symptoms persist 5 minutes after 1st dose call 911.    Atherosclerosis of native coronary artery of native heart with unstable angina pectoris (H)       * oxyCODONE 5 MG IR tablet    ROXICODONE    20 tablet    Take 1-2 tablets (5-10 mg) by mouth every 4 hours as needed for moderate to severe pain    Closed Colles' fracture of right radius, initial encounter       * oxyCODONE 5 MG capsule    OXY-IR    60 capsule    Take 1-2 capsules (5-10 mg) by mouth every 4 hours as needed for moderate to severe pain    Closed Colles' fracture of right radius, initial encounter       rosuvastatin 40 MG tablet    CRESTOR    90 tablet    Take 1 tablet (40 mg) by mouth daily    Hyperlipidemia LDL goal <130       zolpidem 10 MG tablet    AMBIEN    30 tablet    Take 1 tablet (10 mg) by mouth nightly as needed for sleep    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, insomnia type       * Notice:  This list has 2 medication(s) that are the same as other medications prescribed for you. Read the directions carefully, and ask your doctor or other care provider to review them with you.

## 2017-07-17 NOTE — LETTER
7/17/2017         RE: Quan Murphy  [id number] [facility] [address]        Dear Colleague,    Thank you for referring your patient, Quan Murphy, to the [facility]. Please see a copy of my visit note below.    ORTHOPEDIC CONSULT      Chief Complaint: Quan Murphy is a 66 year old male who is being seen for Chief Complaint   Patient presents with     Musculoskeletal Problem     Right wrist injury- DOI: 7/14/17- fell off ladder     Consult     ref: ED       History of Present Illness:   Mechanism of Injury: Fell off a ladder approximately 4 feet in the air on an outstretched arm. He was trimming trees.  Location: right wrist   Duration of Pain:  Date of injury: July 14, 2017  Rating of Pain:  moderate.    Pain Quality: aching and sharp  Pain is better with: Rest  Pain is worse with:  flexion, extension, rotation   Treatment so far consists of:  ER evaluation-closed reduction and splinting. Oxycodone and Vistaril.   Associated Features: None  Prior history of related problems:   No previous problem in the affected area.  Presents with his wife            Patient's past medical, surgical, social and family histories reviewed.     Past Medical History:   Diagnosis Date     Allergy, unspecified not elsewhere classified     Seasonal allergies, pollen, dust, smoke and animals     Coronary atherosclerosis of unspecified type of vessel, native or graft     Coronary artery disease     Malignant neoplasm of prostate (H)     Prostate cancer       Past Surgical History:   Procedure Laterality Date     ARTHRODESIS FOOT  7/23/2013    Procedure: ARTHRODESIS FOOT;  Great Toe Arthrodesis Left Foot;  Surgeon: Ash Gonzalez DPM;  Location: [facility] OR     ARTHRODESIS FOOT  6/10/2014    Procedure: ARTHRODESIS FOOT;  Surgeon: Ash Gonzalez DPM;  Location: [facility] OR     C LAPAROSCOPY, SURGICAL PROSTATECTOMY, RETROPUBIC RADICAL, W/NERVE SPARING  11/30/2004    With full bilateral pelvic  lymphadenectomy.  F-[facility].     C TOTAL KNEE ARTHROPLASTY  05/01/08    Left knee     COLONOSCOPY  10/7/2013    Procedure: COLONOSCOPY;  Colonoscopy;  Surgeon: Mike Fallon MD;  Location: [facility] GI     HC CORRECT BUNION,SIMPLE  08/11/2005    x3     HC REMV TOE BENIGN BONE LESN  08/11/2005     HERNIORRHAPHY INGUINAL  7/3/2013    Procedure: HERNIORRHAPHY INGUINAL;  Open Repair Inguinal hernia Right with mesh ;  Surgeon: Sam Escobar MD;  Location: [facility] OR     MOHS MICROGRAPHIC PROCEDURE  08/23/11    ear and chin-[facility]     RECONSTRUCT FOREFOOT WITH METATARSOPHALANGEAL (MTP) FUSION  6/10/2014    Procedure: RECONSTRUCT FOREFOOT WITH METATARSOPHALANGEAL (MTP) FUSION;  Surgeon: Ash Gonzalez DPM;  Location: [facility] OR     STENT, CORONARY, DEMI       SURGICAL HISTORY OF -   1999/1974    lt knee     SURGICAL HISTORY OF -   10/2004    lithotripsy     SURGICAL HISTORY OF - 11/05    angiogram with stent       Medications:    Current Outpatient Prescriptions on File Prior to Visit:  oxyCODONE (ROXICODONE) 5 MG IR tablet Take 1-2 tablets (5-10 mg) by mouth every 4 hours as needed for moderate to severe pain   ALPRAZolam (XANAX) 0.5 MG tablet Take 1 tablet (0.5 mg) by mouth 3 times daily as needed for anxiety   HYDROcodone-acetaminophen (NORCO) 7.5-325 MG per tablet Take 1 tablet by mouth 4 times daily as needed for moderate to severe pain   zolpidem (AMBIEN) 10 MG tablet Take 1 tablet (10 mg) by mouth nightly as needed for sleep   rosuvastatin (CRESTOR) 40 MG tablet Take 1 tablet (40 mg) by mouth daily   clopidogrel (PLAVIX) 75 MG tablet Take 1 tablet (75 mg) by mouth daily   aspirin 81 MG EC tablet Take 1 tablet (81 mg) by mouth daily   nitroglycerin (NITROSTAT) 0.4 MG sublingual tablet For chest pain place 1 tablet under the tongue every 5 minutes for 3 doses. If symptoms persist 5 minutes after 1st dose call 911.   citalopram (CELEXA) 40 MG tablet Take 1 tablet (40 mg) by mouth daily   cetirizine (ZYRTEC) 10  "MG tablet Take 10 mg by mouth daily   CIALIS 20 MG tablet Take 20 mg by mouth daily as needed      No current facility-administered medications on file prior to visit.     Allergies   Allergen Reactions     Animal Dander      Dust Mites      Pollen Extract      Smoke.        Social History     Occupational History     Not on file.     Social History Main Topics     Smoking status: Never Smoker     Smokeless tobacco: Never Used     Alcohol use 5.0 oz/week     10 Cans of beer per week      Comment: occasional      Drug use: No     Sexual activity: Yes     Partners: Female       Family History   Problem Relation Age of Onset     Hypertension Father      has had MI      Connective Tissue Disorder Mother      LUPUS     HEART DISEASE Mother      poor valve-needing replacement       REVIEW OF SYSTEMS  10 point review systems performed otherwise negative as noted as per history of present illness.    Physical Exam:  Vitals: Temp 97.1  F (36.2  C) (Temporal)  Ht 5' 8\" (1.727 m)  Wt 204 lb (92.5 kg)  BMI 31.02 kg/m2  BMI= Body mass index is 31.02 kg/(m^2).  Constitutional: healthy, alert and no acute distress   Psychiatric: mentation appears normal and affect normal/bright  NEURO: no focal deficits  RESP: Normal with easy respirations and no use of accessory muscles to breathe, no audible wheezing or retractions  CV: RUE:  hand only-  non-pitting edema         Regular rate and rhythm by palpation  SKIN: No erythema, rashes, excoriation, or breakdown. No evidence of infection. , Sugar tong splint in place. Skin edges intact.  JOINT/EXTREMITIES:right wrist/forearm: No tenderness surrounding the elbow. Fingers are warm and dry with good cap refill. Some minimal swelling into the fingers. Flexors and extensors are grossly intact.     GAIT: not tested     Diagnostic Modalities:  right wrist X-ray: Initial radiographs show comminuted distal radius fracture. Approximate 40-45  apex volar angulation with comminution along the " dorsal margin. Small ulnar styloid fracture.  right wrist X-ray: Much improved alignment. Continued dorsal comminution. Translated approximately 20% dorsal  Independent visualization of the images was performed.      Impression: right comminuted displaced distal radius fracture with smaller styloid fracture    Plan:  All of the above pertinent physical exam and imaging modalities findings was reviewed with Quan and his wife.    Given the nature of the fracture and the risk for malunion, nonunion, loss of motion, and the need for prolonged immobilization causing loss of motion or stiffness, I have recommend open reduction and internal fixation with a plate and screws. I discussed risks, benefits, complication, limitations, and anticipated post-operative course. Risks such as infection, bleeding, non-union, malunion, hardware failure, need for hardware removal, damage to nerves/vessles, scar, scar sensitivity, wound problems, stiffness and loss of motion was discussed. The patient verbalized an understanding of this conversation.  I have recommend this procedure so the patient will  be able to return back to pre-injury function. We also discussed conservative care including casting. I discussed my concern around his dorsal comminution and the likelihood of loss of reduction.    Restrictions: No use of affected extremity    He is on Plavix. He had a stent placed in December. I recommended he talk with his family doctor before discontinuing it.  He will need a history and physical.  Oxycodone and Vistaril prescription provided today.  Return to clinic 10 days post-operatively. , or sooner as needed for changes.  Re-x-ray on return: Yes.    Amor Blanca D.O.    Please schedule for surgery, pre op H&P, and post ops.      Patient Name:  Quan Murphy ([id number]).  :  1950  Gender:  male  Patient Type:  Same Day Surgery  Surgeon:  Pedro Blanca DO  Physician requests assist from:  PA    Procedures:     Right distal radius open reduction and internal fixation   Approach:  NA  Diagnosis:  Closed Colles' fracture of right radius  C-arm:  No  Mini C-arm:   Yes  Pathology Scheduled:  No  Special instruments/supplies:  [employer]  Vendor Rep:  Yes  Anesthesia:  General  Block:  No   Block type:   Time needed:  90 minutes    [facility] Discussed:  Not Applicable    Post op 1:              Again, thank you for allowing me to participate in the care of your patient.        Sincerely,        Perdo Blanca, DO

## 2017-07-18 ENCOUNTER — HOSPITAL ENCOUNTER (OUTPATIENT)
Facility: CLINIC | Age: 67
Discharge: HOME | End: 2017-07-18
Attending: ORTHOPAEDIC SURGERY | Admitting: ORTHOPAEDIC SURGERY
Payer: MEDICARE

## 2017-07-18 ENCOUNTER — ANESTHESIA EVENT (OUTPATIENT)
Dept: SURGERY | Facility: CLINIC | Age: 67
End: 2017-07-18
Payer: MEDICARE

## 2017-07-18 ENCOUNTER — ANESTHESIA (OUTPATIENT)
Dept: SURGERY | Facility: CLINIC | Age: 67
End: 2017-07-18
Payer: MEDICARE

## 2017-07-18 ENCOUNTER — HOSPITAL ENCOUNTER (OUTPATIENT)
Dept: GENERAL RADIOLOGY | Facility: CLINIC | Age: 67
End: 2017-07-18
Attending: ORTHOPAEDIC SURGERY | Admitting: ORTHOPAEDIC SURGERY
Payer: MEDICARE

## 2017-07-18 VITALS
TEMPERATURE: 98.2 F | DIASTOLIC BLOOD PRESSURE: 70 MMHG | OXYGEN SATURATION: 98 % | RESPIRATION RATE: 16 BRPM | SYSTOLIC BLOOD PRESSURE: 122 MMHG | HEART RATE: 67 BPM

## 2017-07-18 DIAGNOSIS — S52.501A FRACTURE OF RIGHT DISTAL RADIUS: ICD-10-CM

## 2017-07-18 DIAGNOSIS — S52.531A CLOSED COLLES' FRACTURE OF RIGHT RADIUS, INITIAL ENCOUNTER: Primary | ICD-10-CM

## 2017-07-18 PROCEDURE — C1713 ANCHOR/SCREW BN/BN,TIS/BN: HCPCS | Performed by: ORTHOPAEDIC SURGERY

## 2017-07-18 PROCEDURE — 40000277 XR SURGERY CARM FLUORO LESS THAN 5 MIN W STILLS

## 2017-07-18 PROCEDURE — 25000125 ZZHC RX 250: Performed by: ORTHOPAEDIC SURGERY

## 2017-07-18 PROCEDURE — 36000058 ZZH SURGERY LEVEL 3 EA 15 ADDTL MIN: Performed by: ORTHOPAEDIC SURGERY

## 2017-07-18 PROCEDURE — 37000008 ZZH ANESTHESIA TECHNICAL FEE, 1ST 30 MIN: Performed by: ORTHOPAEDIC SURGERY

## 2017-07-18 PROCEDURE — 27210794 ZZH OR GENERAL SUPPLY STERILE: Performed by: ORTHOPAEDIC SURGERY

## 2017-07-18 PROCEDURE — 71000014 ZZH RECOVERY PHASE 1 LEVEL 2 FIRST HR: Performed by: ORTHOPAEDIC SURGERY

## 2017-07-18 PROCEDURE — 25000128 H RX IP 250 OP 636: Performed by: NURSE ANESTHETIST, CERTIFIED REGISTERED

## 2017-07-18 PROCEDURE — A9270 NON-COVERED ITEM OR SERVICE: HCPCS | Mod: GY | Performed by: ORTHOPAEDIC SURGERY

## 2017-07-18 PROCEDURE — 71000027 ZZH RECOVERY PHASE 2 EACH 15 MINS: Performed by: ORTHOPAEDIC SURGERY

## 2017-07-18 PROCEDURE — 37000009 ZZH ANESTHESIA TECHNICAL FEE, EACH ADDTL 15 MIN: Performed by: ORTHOPAEDIC SURGERY

## 2017-07-18 PROCEDURE — 27211024 ZZHC OR SUPPLY OTHER OPNP: Performed by: ORTHOPAEDIC SURGERY

## 2017-07-18 PROCEDURE — 25000125 ZZHC RX 250: Performed by: NURSE ANESTHETIST, CERTIFIED REGISTERED

## 2017-07-18 PROCEDURE — 25000128 H RX IP 250 OP 636: Performed by: ORTHOPAEDIC SURGERY

## 2017-07-18 PROCEDURE — 40000306 ZZH STATISTIC PRE PROC ASSESS II: Performed by: ORTHOPAEDIC SURGERY

## 2017-07-18 PROCEDURE — 36000060 ZZH SURGERY LEVEL 3 W FLUORO 1ST 30 MIN: Performed by: ORTHOPAEDIC SURGERY

## 2017-07-18 PROCEDURE — 25607 OPTX DST RD XARTC FX/EPI SEP: CPT | Mod: 78 | Performed by: ORTHOPAEDIC SURGERY

## 2017-07-18 PROCEDURE — 25000566 ZZH SEVOFLURANE, EA 15 MIN: Performed by: ORTHOPAEDIC SURGERY

## 2017-07-18 PROCEDURE — S0020 INJECTION, BUPIVICAINE HYDRO: HCPCS | Performed by: ORTHOPAEDIC SURGERY

## 2017-07-18 PROCEDURE — 25000132 ZZH RX MED GY IP 250 OP 250 PS 637: Mod: GY | Performed by: ORTHOPAEDIC SURGERY

## 2017-07-18 PROCEDURE — 71000015 ZZH RECOVERY PHASE 1 LEVEL 2 EA ADDTL HR: Performed by: ORTHOPAEDIC SURGERY

## 2017-07-18 DEVICE — IMPLANTABLE DEVICE: Type: IMPLANTABLE DEVICE | Site: WRIST | Status: FUNCTIONAL

## 2017-07-18 DEVICE — IMP PEG HANDINN SUBCHONDRAL 2.0X22MM P22000: Type: IMPLANTABLE DEVICE | Site: WRIST | Status: FUNCTIONAL

## 2017-07-18 DEVICE — IMP PEG HANDINN SUBCHONDRAL 2.0X24MM P24000: Type: IMPLANTABLE DEVICE | Site: WRIST | Status: FUNCTIONAL

## 2017-07-18 DEVICE — IMP PEG HANDINN SUBCHONDRAL 2.0X20MM P20000: Type: IMPLANTABLE DEVICE | Site: WRIST | Status: FUNCTIONAL

## 2017-07-18 DEVICE — IMP PLATE HANDINN VOLAR STANDARD RT DVRAR: Type: IMPLANTABLE DEVICE | Site: WRIST | Status: FUNCTIONAL

## 2017-07-18 DEVICE — IMP SCR CORTICAL HANDINN 3.5X16MM CS16000: Type: IMPLANTABLE DEVICE | Site: WRIST | Status: FUNCTIONAL

## 2017-07-18 RX ORDER — FENTANYL CITRATE 50 UG/ML
25-50 INJECTION, SOLUTION INTRAMUSCULAR; INTRAVENOUS
Status: DISCONTINUED | OUTPATIENT
Start: 2017-07-18 | End: 2017-07-18

## 2017-07-18 RX ORDER — CEFAZOLIN SODIUM 1 G/3ML
1 INJECTION, POWDER, FOR SOLUTION INTRAMUSCULAR; INTRAVENOUS SEE ADMIN INSTRUCTIONS
Status: DISCONTINUED | OUTPATIENT
Start: 2017-07-18 | End: 2017-07-18 | Stop reason: HOSPADM

## 2017-07-18 RX ORDER — CEFAZOLIN SODIUM 2 G/100ML
2 INJECTION, SOLUTION INTRAVENOUS
Status: COMPLETED | OUTPATIENT
Start: 2017-07-18 | End: 2017-07-18

## 2017-07-18 RX ORDER — SODIUM CHLORIDE, SODIUM LACTATE, POTASSIUM CHLORIDE, CALCIUM CHLORIDE 600; 310; 30; 20 MG/100ML; MG/100ML; MG/100ML; MG/100ML
INJECTION, SOLUTION INTRAVENOUS CONTINUOUS
Status: DISCONTINUED | OUTPATIENT
Start: 2017-07-18 | End: 2017-07-18

## 2017-07-18 RX ORDER — SODIUM CHLORIDE, SODIUM LACTATE, POTASSIUM CHLORIDE, CALCIUM CHLORIDE 600; 310; 30; 20 MG/100ML; MG/100ML; MG/100ML; MG/100ML
INJECTION, SOLUTION INTRAVENOUS CONTINUOUS
Status: DISCONTINUED | OUTPATIENT
Start: 2017-07-18 | End: 2017-07-19 | Stop reason: HOSPADM

## 2017-07-18 RX ORDER — NALOXONE HYDROCHLORIDE 0.4 MG/ML
.1-.4 INJECTION, SOLUTION INTRAMUSCULAR; INTRAVENOUS; SUBCUTANEOUS
Status: DISCONTINUED | OUTPATIENT
Start: 2017-07-18 | End: 2017-07-19 | Stop reason: HOSPADM

## 2017-07-18 RX ORDER — HYDROCODONE BITARTRATE AND ACETAMINOPHEN 5; 325 MG/1; MG/1
1-2 TABLET ORAL
Status: COMPLETED | OUTPATIENT
Start: 2017-07-18 | End: 2017-07-18

## 2017-07-18 RX ORDER — LIDOCAINE HYDROCHLORIDE 20 MG/ML
INJECTION, SOLUTION INFILTRATION; PERINEURAL PRN
Status: DISCONTINUED | OUTPATIENT
Start: 2017-07-18 | End: 2017-07-18

## 2017-07-18 RX ORDER — LIDOCAINE 40 MG/G
CREAM TOPICAL
Status: DISCONTINUED | OUTPATIENT
Start: 2017-07-18 | End: 2017-07-18 | Stop reason: HOSPADM

## 2017-07-18 RX ORDER — FENTANYL CITRATE 50 UG/ML
INJECTION, SOLUTION INTRAMUSCULAR; INTRAVENOUS PRN
Status: DISCONTINUED | OUTPATIENT
Start: 2017-07-18 | End: 2017-07-18

## 2017-07-18 RX ORDER — DIMENHYDRINATE 50 MG/ML
25 INJECTION, SOLUTION INTRAMUSCULAR; INTRAVENOUS
Status: DISCONTINUED | OUTPATIENT
Start: 2017-07-18 | End: 2017-07-19 | Stop reason: HOSPADM

## 2017-07-18 RX ORDER — MEPERIDINE HYDROCHLORIDE 50 MG/ML
12.5 INJECTION INTRAMUSCULAR; INTRAVENOUS; SUBCUTANEOUS
Status: DISCONTINUED | OUTPATIENT
Start: 2017-07-18 | End: 2017-07-19 | Stop reason: HOSPADM

## 2017-07-18 RX ORDER — FENTANYL CITRATE 50 UG/ML
25-50 INJECTION, SOLUTION INTRAMUSCULAR; INTRAVENOUS
Status: DISCONTINUED | OUTPATIENT
Start: 2017-07-18 | End: 2017-07-19 | Stop reason: HOSPADM

## 2017-07-18 RX ORDER — ONDANSETRON 2 MG/ML
4 INJECTION INTRAMUSCULAR; INTRAVENOUS EVERY 30 MIN PRN
Status: DISCONTINUED | OUTPATIENT
Start: 2017-07-18 | End: 2017-07-19 | Stop reason: HOSPADM

## 2017-07-18 RX ORDER — DEXAMETHASONE SODIUM PHOSPHATE 10 MG/ML
INJECTION, SOLUTION INTRAMUSCULAR; INTRAVENOUS PRN
Status: DISCONTINUED | OUTPATIENT
Start: 2017-07-18 | End: 2017-07-18

## 2017-07-18 RX ORDER — PROPOFOL 10 MG/ML
INJECTION, EMULSION INTRAVENOUS PRN
Status: DISCONTINUED | OUTPATIENT
Start: 2017-07-18 | End: 2017-07-18

## 2017-07-18 RX ORDER — ONDANSETRON 4 MG/1
4 TABLET, ORALLY DISINTEGRATING ORAL EVERY 30 MIN PRN
Status: DISCONTINUED | OUTPATIENT
Start: 2017-07-18 | End: 2017-07-19 | Stop reason: HOSPADM

## 2017-07-18 RX ORDER — ONDANSETRON 2 MG/ML
INJECTION INTRAMUSCULAR; INTRAVENOUS PRN
Status: DISCONTINUED | OUTPATIENT
Start: 2017-07-18 | End: 2017-07-18

## 2017-07-18 RX ORDER — BUPIVACAINE HYDROCHLORIDE 5 MG/ML
INJECTION, SOLUTION PERINEURAL PRN
Status: DISCONTINUED | OUTPATIENT
Start: 2017-07-18 | End: 2017-07-18 | Stop reason: HOSPADM

## 2017-07-18 RX ADMIN — HYDROMORPHONE HYDROCHLORIDE 0.5 MG: 1 INJECTION, SOLUTION INTRAMUSCULAR; INTRAVENOUS; SUBCUTANEOUS at 19:20

## 2017-07-18 RX ADMIN — SODIUM CHLORIDE, POTASSIUM CHLORIDE, SODIUM LACTATE AND CALCIUM CHLORIDE: 600; 310; 30; 20 INJECTION, SOLUTION INTRAVENOUS at 17:19

## 2017-07-18 RX ADMIN — FENTANYL CITRATE 50 MCG: 50 INJECTION INTRAMUSCULAR; INTRAVENOUS at 18:24

## 2017-07-18 RX ADMIN — PROPOFOL 200 MG: 10 INJECTION, EMULSION INTRAVENOUS at 16:32

## 2017-07-18 RX ADMIN — SUCCINYLCHOLINE CHLORIDE 100 MG: 20 INJECTION, SOLUTION INTRAMUSCULAR; INTRAVENOUS at 16:37

## 2017-07-18 RX ADMIN — FENTANYL CITRATE 50 MCG: 50 INJECTION, SOLUTION INTRAMUSCULAR; INTRAVENOUS at 16:32

## 2017-07-18 RX ADMIN — MIDAZOLAM HYDROCHLORIDE 2 MG: 1 INJECTION, SOLUTION INTRAMUSCULAR; INTRAVENOUS at 16:25

## 2017-07-18 RX ADMIN — HYDROMORPHONE HYDROCHLORIDE 0.5 MG: 1 INJECTION, SOLUTION INTRAMUSCULAR; INTRAVENOUS; SUBCUTANEOUS at 18:44

## 2017-07-18 RX ADMIN — ONDANSETRON 4 MG: 2 INJECTION INTRAMUSCULAR; INTRAVENOUS at 17:07

## 2017-07-18 RX ADMIN — LIDOCAINE HYDROCHLORIDE 100 MG: 20 INJECTION, SOLUTION INFILTRATION; PERINEURAL at 16:32

## 2017-07-18 RX ADMIN — PHENYLEPHRINE HYDROCHLORIDE 150 MCG: 10 INJECTION, SOLUTION INTRAMUSCULAR; INTRAVENOUS; SUBCUTANEOUS at 17:10

## 2017-07-18 RX ADMIN — FENTANYL CITRATE 100 MCG: 50 INJECTION, SOLUTION INTRAMUSCULAR; INTRAVENOUS at 16:57

## 2017-07-18 RX ADMIN — MIDAZOLAM HYDROCHLORIDE 2 MG: 1 INJECTION, SOLUTION INTRAMUSCULAR; INTRAVENOUS at 16:57

## 2017-07-18 RX ADMIN — LIDOCAINE HYDROCHLORIDE 1 ML: 10 INJECTION, SOLUTION EPIDURAL; INFILTRATION; INTRACAUDAL; PERINEURAL at 15:17

## 2017-07-18 RX ADMIN — CEFAZOLIN SODIUM 2 G: 2 INJECTION, SOLUTION INTRAVENOUS at 16:41

## 2017-07-18 RX ADMIN — DEXAMETHASONE SODIUM PHOSPHATE 10 MG: 10 INJECTION, SOLUTION INTRAMUSCULAR; INTRAVENOUS at 17:07

## 2017-07-18 RX ADMIN — FENTANYL CITRATE 50 MCG: 50 INJECTION, SOLUTION INTRAMUSCULAR; INTRAVENOUS at 17:32

## 2017-07-18 RX ADMIN — HYDROMORPHONE HYDROCHLORIDE 0.5 MG: 1 INJECTION, SOLUTION INTRAMUSCULAR; INTRAVENOUS; SUBCUTANEOUS at 19:07

## 2017-07-18 RX ADMIN — FENTANYL CITRATE 50 MCG: 50 INJECTION, SOLUTION INTRAMUSCULAR; INTRAVENOUS at 17:51

## 2017-07-18 RX ADMIN — PROPOFOL 100 MG: 10 INJECTION, EMULSION INTRAVENOUS at 16:37

## 2017-07-18 RX ADMIN — PROPOFOL 50 MG: 10 INJECTION, EMULSION INTRAVENOUS at 17:43

## 2017-07-18 RX ADMIN — SODIUM CHLORIDE, POTASSIUM CHLORIDE, SODIUM LACTATE AND CALCIUM CHLORIDE: 600; 310; 30; 20 INJECTION, SOLUTION INTRAVENOUS at 15:16

## 2017-07-18 RX ADMIN — FENTANYL CITRATE 50 MCG: 50 INJECTION INTRAMUSCULAR; INTRAVENOUS at 18:36

## 2017-07-18 RX ADMIN — HYDROMORPHONE HYDROCHLORIDE 0.5 MG: 1 INJECTION, SOLUTION INTRAMUSCULAR; INTRAVENOUS; SUBCUTANEOUS at 18:54

## 2017-07-18 RX ADMIN — HYDROCODONE BITARTRATE AND ACETAMINOPHEN 2 TABLET: 5; 325 TABLET ORAL at 19:21

## 2017-07-18 RX ADMIN — FENTANYL CITRATE 50 MCG: 50 INJECTION INTRAMUSCULAR; INTRAVENOUS at 18:30

## 2017-07-18 RX ADMIN — FENTANYL CITRATE 50 MCG: 50 INJECTION INTRAMUSCULAR; INTRAVENOUS at 18:17

## 2017-07-18 NOTE — ANESTHESIA PREPROCEDURE EVALUATION
Anesthesia Evaluation     . Pt has had prior anesthetic. Type: General and MAC    No history of anesthetic complications          ROS/MED HX    ENT/Pulmonary:  - neg pulmonary ROS   (+)HERMES risk factors hypertension, , . .    Neurologic:  - neg neurologic ROS     Cardiovascular:     (+) Dyslipidemia, hypertension--CAD, --stent,01/05/2017  Drug Eluting Stent .. : . . . :. . Previous cardiac testing date:results:date: results:ECG reviewed date:07/17/2017 results:NSR date: results:          METS/Exercise Tolerance:     Hematologic:  - neg hematologic  ROS       Musculoskeletal:   (+) arthritis, , , -       GI/Hepatic:  - neg GI/hepatic ROS       Renal/Genitourinary: Comment: Elevated creatinine 1.24    (+) Pt has no history of transplant,       Endo:  - neg endo ROS       Psychiatric:     (+) psychiatric history anxiety      Infectious Disease:  - neg infectious disease ROS       Malignancy:      - no malignancy   Other:    - neg other ROS                 Physical Exam  Normal systems: cardiovascular, pulmonary and dental    Airway   Mallampati: I  TM distance: >3 FB  Neck ROM: full    Dental     Cardiovascular   Rhythm and rate: regular and normal      Pulmonary    breath sounds clear to auscultation                    Anesthesia Plan      History & Physical Review  History and physical reviewed and following examination; no interval change.    ASA Status:  2 .    NPO Status:  > 8 hours    Plan for General and LMA with Intravenous and Propofol induction. Maintenance will be Balanced.    PONV prophylaxis:  Ondansetron (or other 5HT-3) and Dexamethasone or Solumedrol       Postoperative Care  Postoperative pain management:  IV analgesics and Oral pain medications.      Consents  Anesthetic plan, risks, benefits and alternatives discussed with:  Patient.  Use of blood products discussed: No .   .                          .

## 2017-07-18 NOTE — BRIEF OP NOTE
[facility] Brief Operative Note    Pre-operative diagnosis: right displaced comminuted distal radius fracture   Post-operative diagnosis: right displaced comminuted distal radius fracture   Procedure: Procedure(s):  OPEN REDUCTION INTERNAL FIXATION WRIST   Surgeon: Pedro Blanca DO   Assistant(s): Tereso Swain CNP   Estimated blood loss:  Fluids:  TT/pressure: 30 ml  900 ml Crystalloids  39 minutes at 250mmHg   Specimens: None   Findings: See dictated operative report for full details [id number]     Amor Blanca D.O.

## 2017-07-18 NOTE — IP AVS SNAPSHOT
[facility] Phase II    [address]     [place] [address]    Phone:  [phone]                                       After Visit Summary   7/18/2017    Quan Murphy    MRN: [id number]           After Visit Summary Signature Page     I have received my discharge instructions, and my questions have been answered. I have discussed any challenges I see with this plan with the nurse or doctor.    ..........................................................................................................................................  Patient/Patient Representative Signature      ..........................................................................................................................................  Patient Representative Print Name and Relationship to Patient    ..................................................               ................................................  Date                                            Time    ..........................................................................................................................................  Reviewed by Signature/Title    ...................................................              ..............................................  Date                                                            Time

## 2017-07-18 NOTE — IP AVS SNAPSHOT
MRN:[id number]                      After Visit Summary   7/18/2017    Quan Murphy    MRN: [id number]           Thank you!     Thank you for choosing [facility] for your care. Our goal is always to provide you with excellent care. Hearing back from our patients is one way we can continue to improve our services. Please take a few minutes to complete the written survey that you may receive in the mail after you visit with us. Thank you!        Patient Information     Date Of Birth          1950        About your hospital stay     You were admitted on:  July 18, 2017 You last received care in the:  [facility] Phase II    You were discharged on:  July 18, 2017       Who to Call     For medical emergencies, please call 911.  For non-urgent questions about your medical care, please call your primary care provider or clinic, [phone]  For questions related to your surgery, please call your surgery clinic        Attending Provider     Provider Pedro Shetty,  Orthopedics       Primary Care Provider Office Phone # Fax #    Jose Hernandez [phone]      After Care Instructions      Diet as Tolerated       Return to diet before surgery, unless instructed otherwise.            Discharge Instructions       Review outpatient procedure discharge instructions with patient as directed by Provider            Discharge Instructions - Lifting Limit (specify)       Lifting limit  0 pounds until seen at Post-op follow up appointment.            Ice to affected area       Ice pack to surgical site every 15 minutes per hour for 24 hours            No Alcohol       For 24 hours post procedure            No Dressing Change       No dressing change until follow up appointment.            No driving or operating machinery        until the day after procedure            Notify Provider       For signs and symptoms of infection: Fever greater than 101, redness,  swelling, heat at site, drainage, pus.            Return to clinic       Return to clinic in 10 days            Wound care       Do not immerse wound in water until sutures removed                  Your next 10 appointments already scheduled     2017  9:20 AM CDT   Return Visit with Pedro Blanca DO   [facility]    [address] [place] [address]   [phone]              Further instructions from your care team       For bathing, you may want to consider sitting in a bathtub with your right arm propped on a pillow, so as to keep your surgical area dry.  [facility] Same-Day Surgery   Adult Discharge Orders & Instructions     For 24 hours after surgery    1. Get plenty of rest.  A responsible adult must stay with you for at least 24 hours after you leave the hospital.   2. Do not drive or use heavy equipment.  If you have weakness or tingling, don't drive or use heavy equipment until this feeling goes away.  3. Do not drink alcohol.  4. Avoid strenuous or risky activities.  Ask for help when climbing stairs.   5. You may feel lightheaded.  IF so, sit for a few minutes before standing.  Have someone help you get up.   6. If you have nausea (feel sick to your stomach): Drink only clear liquids such as apple juice, ginger ale, broth or 7-Up.  Rest may also help.  Be sure to drink enough fluids.  Move to a regular diet as you feel able.  7. You may have a slight fever. Call the doctor if your fever is over 100 F (37.7 C) (taken under the tongue) or lasts longer than 24 hours.  8. You may have a dry mouth, a sore throat, muscle aches or trouble sleeping.  These should go away after 24 hours.  9. Do not make important or legal decisions.   Call your doctor for any of the followin.  Signs of infection (fever, growing tenderness at the surgery site, a large amount of drainage or bleeding, severe pain, foul-smelling drainage,  redness, swelling).    2. It has been over 8 to 10 hours since surgery and you are still not able to urinate (pass water).    3.  Headache for over 24 hours.       To contact a doctor, call [phone]    Pending Results     No orders found from 7/16/2017 to 7/19/2017.            Admission Information     Date & Time Provider Department Dept. Phone    7/18/2017 Pedro Blanca DO [facility] Phase [phone]      Your Vitals Were     Blood Pressure Pulse Temperature Respirations Pulse Oximetry       158/104 67 98.2  F (36.8  C) (Axillary) 9 97%       MyChart Information     [facility] gives you secure access to your electronic health record. If you see a primary care provider, you can also send messages to your care team and make appointments. If you have questions, please call your primary care clinic.  If you do not have a primary care provider, please call [phone] and they will assist you.        Care EveryWhere ID     This is your Care EveryWhere ID. This could be used by other organizations to access your [facility] medical records  [id number]        Equal Access to Services     CHERYL HARRIS : Hadii linda Aly, angelita ortiz, mitchell khan. So [facility] [phone].    ATENCIÓN: Si habla español, tiene a recinos disposición servicios gratuitos de asistencia lingüística. Ramin al [phone].    We comply with applicable federal civil rights laws and [place] laws. We do not discriminate on the basis of race, color, national origin, age, disability sex, sexual orientation or gender identity.               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)        Dose:  81 mg   Take 1 tablet (81 mg) by mouth daily   Quantity:  90 tablet   Refills:  3       cetirizine 10 MG tablet   Commonly known as:  zyrTEC        Dose:  10 mg   Take 10 mg by mouth daily   Refills:  0       CIALIS 20 MG tablet   Generic drug:  tadalafil        Dose:  20 mg   Take 20 mg by mouth daily as needed   Refills:  0       citalopram 40 MG tablet   Commonly known as:  celeXA   Used for:  Anxiety        Dose:  40 mg   Take 1 tablet (40 mg) by mouth daily   Quantity:  30 tablet   Refills:  6       clopidogrel 75 MG tablet   Commonly known as:  PLAVIX   Used for:  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)        Dose:  75 mg   Take 1 tablet (75 mg) by mouth daily   Quantity:  90 tablet   Refills:  3       HYDROcodone-acetaminophen 7.5-325 MG per tablet   Commonly known as:  NORCO   Used for:  Arthropathy        Dose:  1 tablet   Take 1 tablet by mouth 4 times daily as needed for moderate to severe pain   Quantity:  100 tablet   Refills:  0       hydrOXYzine 25 MG capsule   Commonly known as:  VISTARIL   Used for:  Closed Colles' fracture of right radius, initial encounter        Dose:  25 mg   Take 1 capsule (25 mg) by mouth 3 times daily as needed for itching   Quantity:  60 capsule   Refills:  0       nitroGLYcerin 0.4 MG sublingual tablet   Commonly known as:  NITROSTAT   Used for:  Atherosclerosis of native coronary artery of native heart with unstable angina pectoris (H)        For chest pain place 1 tablet under the tongue every 5 minutes for 3 doses. If symptoms persist 5 minutes after 1st dose call 911.   Quantity:  25 tablet   Refills:  0       * oxyCODONE 5 MG IR tablet   Commonly known as:  ROXICODONE   Used for:  Closed Colles' fracture of right radius, initial encounter        Dose:  5-10 mg    Take 1-2 tablets (5-10 mg) by mouth every 4 hours as needed for moderate to severe pain   Quantity:  20 tablet   Refills:  0       * oxyCODONE 5 MG capsule   Commonly known as:  OXY-IR   Used for:  Closed Colles' fracture of right radius, initial encounter        Dose:  5-10 mg   Take 1-2 capsules (5-10 mg) by mouth every 4 hours as needed for moderate to severe pain   Quantity:  60 capsule   Refills:  0       rosuvastatin 40 MG tablet   Commonly known as:  CRESTOR   Used for:  Hyperlipidemia LDL goal <130        Dose:  40 mg   Take 1 tablet (40 mg) by mouth daily   Quantity:  90 tablet   Refills:  3       zolpidem 10 MG tablet   Commonly known as:  AMBIEN   Used for:  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 Read the directions carefully, and ask your doctor or other care provider to review them with you.             Protect others around you: Learn how to safely use, store and throw away your medicines at www.disposemymeds.org.             Medication List: This is a list of all your medications and when to take them. Check marks below indicate your daily home schedule. Keep this list as a reference.      Medications           Morning Afternoon Evening Bedtime As Needed    ALPRAZolam 0.5 MG tablet   Commonly known as:  XANAX   Take 1 tablet (0.5 mg) by mouth 3 times daily as needed for anxiety                                aspirin 81 MG EC tablet   Take 1 tablet (81 mg) by mouth daily                                cetirizine 10 MG tablet   Commonly known as:  zyrTEC   Take 10 mg by mouth daily                                CIALIS 20 MG tablet   Take 20 mg by mouth daily as needed   Generic drug:  tadalafil                                citalopram 40 MG tablet    Commonly known as:  celeXA   Take 1 tablet (40 mg) by mouth daily                                clopidogrel 75 MG tablet   Commonly known as:  PLAVIX   Take 1 tablet (75 mg) by mouth daily                                HYDROcodone-acetaminophen 7.5-325 MG per tablet   Commonly known as:  NORCO   Take 1 tablet by mouth 4 times daily as needed for moderate to severe pain                                hydrOXYzine 25 MG capsule   Commonly known as:  VISTARIL   Take 1 capsule (25 mg) by mouth 3 times daily as needed for itching                                nitroGLYcerin 0.4 MG sublingual tablet   Commonly known as:  NITROSTAT   For chest pain place 1 tablet under the tongue every 5 minutes for 3 doses. If symptoms persist 5 minutes after 1st dose call 911.                                * oxyCODONE 5 MG IR tablet   Commonly known as:  ROXICODONE   Take 1-2 tablets (5-10 mg) by mouth every 4 hours as needed for moderate to severe pain                                * oxyCODONE 5 MG capsule   Commonly known as:  OXY-IR   Take 1-2 capsules (5-10 mg) by mouth every 4 hours as needed for moderate to severe pain                                rosuvastatin 40 MG tablet   Commonly known as:  CRESTOR   Take 1 tablet (40 mg) by mouth daily                                zolpidem 10 MG tablet   Commonly known as:  AMBIEN   Take 1 tablet (10 mg) by mouth nightly as needed for sleep                                * Notice:  This list has 2 medication(s) that are the same as other medications prescribed for you. Read the directions carefully, and ask your doctor or other care provider to review them with you.

## 2017-07-18 NOTE — DISCHARGE INSTRUCTIONS
For bathing, you may want to consider sitting in a bathtub with your right arm propped on a pillow, so as to keep your surgical area dry.  [facility] Same-Day [facility]   Adult Discharge Orders & Instructions     For 24 hours after surgery    1. Get plenty of rest.  A responsible adult must stay with you for at least 24 hours after you leave the hospital.   2. Do not drive or use heavy equipment.  If you have weakness or tingling, don't drive or use heavy equipment until this feeling goes away.  3. Do not drink alcohol.  4. Avoid strenuous or risky activities.  Ask for help when climbing stairs.   5. You may feel lightheaded.  IF so, sit for a few minutes before standing.  Have someone help you get up.   6. If you have nausea (feel sick to your stomach): Drink only clear liquids such as apple juice, ginger ale, broth or 7-Up.  Rest may also help.  Be sure to drink enough fluids.  Move to a regular diet as you feel able.  7. You may have a slight fever. Call the doctor if your fever is over 100 F (37.7 C) (taken under the tongue) or lasts longer than 24 hours.  8. You may have a dry mouth, a sore throat, muscle aches or trouble sleeping.  These should go away after 24 hours.  9. Do not make important or legal decisions.   Call your doctor for any of the followin.  Signs of infection (fever, growing tenderness at the surgery site, a large amount of drainage or bleeding, severe pain, foul-smelling drainage, redness, swelling).    2. It has been over 8 to 10 hours since surgery and you are still not able to urinate (pass water).    3.  Headache for over 24 hours.       To contact a doctor, call [phone]

## 2017-07-18 NOTE — ANESTHESIA CARE TRANSFER NOTE
Patient: Quan Murphy    Procedure(s):  Right distal radius open reduction and internal fixation - Wound Class: I-Clean    Diagnosis: closed colles fracture of right radius  Diagnosis Additional Information: No value filed.    Anesthesia Type:   General, LMA     Note:  Airway :Face Mask  Patient transferred to:PACU        Vitals: (Last set prior to Anesthesia Care Transfer)    CRNA VITALS  7/18/2017 1733 - 7/18/2017 1809      7/18/2017             SpO2: 100 %                Electronically Signed By: MARCY Carmona CRNA  July 18, 2017  6:09 PM

## 2017-07-19 NOTE — OR NURSING
After use of incentive spirometer and up walking the halls, patient's oxygen level was 100% on room air.  Discharge instructions given to Patient and Patient's spouse;  Alessandra.  Also, the importance of the incentive spirometer use at home.  Both verbalized understanding, and were anxious for discharge.

## 2017-07-19 NOTE — ANESTHESIA POSTPROCEDURE EVALUATION
Patient: Quan Murphy    Procedure(s):  Right distal radius open reduction and internal fixation - Wound Class: I-Clean    Diagnosis:closed colles fracture of right radius  Diagnosis Additional Information: No value filed.    Anesthesia Type:  General, LMA    Note:  Anesthesia Post Evaluation    Patient location during evaluation: Phase 2  Patient participation: Able to fully participate in evaluation  Level of consciousness: awake and alert  Pain management: adequate  Airway patency: patent  Cardiovascular status: acceptable  Respiratory status: acceptable  Hydration status: acceptable  PONV: none     Anesthetic complications: None    Comments: Pt is stable. Pain is well controlled with medication. Pt states he is happy with the care he received. DC'd to home with wife.        Last vitals:  Vitals:    07/18/17 1915 07/18/17 1930 07/18/17 1945   BP: (!) 152/96 (!) 158/104    Pulse:      Resp: 10 9    Temp:      SpO2: 98% 99% 97%         Electronically Signed By: MARCY Carmona CRNA  July 18, 2017  7:53 PM

## 2017-07-22 NOTE — OP NOTE
Surgeon / Clinician: Pedro Blanca DO    DATE OF SURGERY:  07/18/2017    PREOPERATIVE DIAGNOSIS:  Right displaced comminuted distal radius fracture.    POSTOPERATIVE DIAGNOSIS:  Right displaced comminuted distal radius fracture.    PROCEDURE:  Open reduction, internal fixation right distal radius fracture.    SURGEON:  Pedro Blanca MD    First Assistant:  Tereso Carroll NP    ANESTHESIA:  General.    ESTIMATED BLOOD LOSS:  30 ml    FLUIDS:  900 ml OF  crystalloid.      TOURNIQUET TIME PRESSURE:  39 minutes at 250 mmHg on his upper arm.      Counts:  Correct.    Disposition:  PACU in stable condition.    Speculation:  Hand innovation plate.    GROSS FINDINGS:  There is a comminuted displaced distal radius fracture with a small ulnar styloid fracture.      INDICATIONS:  Quan Murphy is a 66-year-old male who fell off a ladder sustaining the above injury.  He initially went to provisional reduction in the ER showed improved alignment.  He subsequently was seen in my office.  We discussed treatment options.  Given the initial displaced nature as well as the comminution we discussed surgical fixation.  I reviewed the risks benefits complications alternatives at length.  Postop timeframe for recovery reviewed once reviewed he has opted to proceed surgically.  He was met preoperatively and informed consent was verified.  Appropriate site was marked.  We medically optimized to proceed with surgery from his H and P.     Description of Procedure:  He was transferred to the operating room table without incident.  When deemed appropriate by anesthesia, right upper extremities prepped and draped in normal manner.  Prior to incision a time-out was performed.  The patient surgery and extremities verified, antibiotics administered.  A Esmarch utilized to exsanguinate the extremity.  Tourniquet was inflated to 250 mmHg in his upper arm.  The FCR approach was utilized.  Skin incision was performed sharply.  A combination of blunt  sharp dissection taken down to the FCR  tendon.  The sheath was then incised.  The FCR  was displaced radially and the deep aspect of the sheath was incised.  The FPL was swept out of the way and a blunt retractor was placed.  The pronator was then released off the radial aspect of the bone revealing the fracture.  Of note, the fracture had lost its reduction while in the splint.  With fracture visualized the interposed soft tissue was removed.  A reduction maneuver was performed.  Multiplanar fluoroscopy showed acceptable reduction.  I placed the hand innovation plate and provisionally held with Cheikh wire.  I confirmed placement both distally as well as centered on the AP.  Without alignment I placed the provisional screw cortical to proximal aspect of placing the plate down nicely.  I then drilled the distal holes in standard AO fashion with drill and measure with no issues.  They used the  distal screw was a cortical screw which compressed down.  The remainder were pegs.  I then placed the remainder of the proximal screws.  I evaluated under fluoroscopy that showed an acceptable reduction.  No prominence of the hardware.  Of note, all screws were placed with no issues and no plunging.  Tourniquet was deflated.  There was some oozing along the skin edges that was electrocautery.  The area was copiously irrigated.  The subcutaneous tissues were approximated 2-0 nylon followed by a running 4-0 Monocryl suture and Dermabond of the skin.  A sterile bandage applied with a volar splint.  He was then transferred PACU in stable condition to discharge home.  He already has pain medications at home.  Follow-up appointment scheduled discharge provide this with Doppler.          Pedro Blnaca DO    D:  07/18/2017 19:09 T:  07/22/2017 08:40  Document:  [id number]\DON

## 2017-07-24 ENCOUNTER — MYC MEDICAL ADVICE (OUTPATIENT)
Dept: ORTHOPEDICS | Facility: OTHER | Age: 67
End: 2017-07-24

## 2017-07-26 ENCOUNTER — MYC REFILL (OUTPATIENT)
Dept: FAMILY MEDICINE | Facility: CLINIC | Age: 67
End: 2017-07-26

## 2017-07-26 DIAGNOSIS — M12.9 ARTHROPATHY: ICD-10-CM

## 2017-07-26 RX ORDER — HYDROCODONE BITARTRATE AND ACETAMINOPHEN 7.5; 325 MG/1; MG/1
1 TABLET ORAL 4 TIMES DAILY PRN
Qty: 100 TABLET | Refills: 0 | Status: SHIPPED | OUTPATIENT
Start: 2017-07-26 | End: 2017-08-28

## 2017-07-26 NOTE — TELEPHONE ENCOUNTER
RX was walked to our clinic pharmacy.     Jyoti Muñoz CMA ([facility])  7/26/2017

## 2017-07-26 NOTE — TELEPHONE ENCOUNTER
Message from MyChart:  Original authorizing provider: MD Quan Dean would like a refill of the following medications:  HYDROcodone-acetaminophen (NORCO) 7.5-325 MG per tablet [Jose Hernandez MD]    Preferred pharmacy: [address]     Comment:

## 2017-07-27 ENCOUNTER — RADIANT APPOINTMENT (OUTPATIENT)
Dept: GENERAL RADIOLOGY | Facility: CLINIC | Age: 67
End: 2017-07-27
Attending: ORTHOPAEDIC SURGERY
Payer: MEDICARE

## 2017-07-27 ENCOUNTER — OFFICE VISIT (OUTPATIENT)
Dept: ORTHOPEDICS | Facility: CLINIC | Age: 67
End: 2017-07-27
Payer: MEDICARE

## 2017-07-27 VITALS — TEMPERATURE: 97.3 F | BODY MASS INDEX: 30.84 KG/M2 | HEIGHT: 68 IN | WEIGHT: 203.5 LBS

## 2017-07-27 DIAGNOSIS — S52.531D CLOSED COLLES' FRACTURE OF RIGHT RADIUS WITH ROUTINE HEALING, SUBSEQUENT ENCOUNTER: Primary | ICD-10-CM

## 2017-07-27 DIAGNOSIS — M25.531 RIGHT WRIST PAIN: ICD-10-CM

## 2017-07-27 DIAGNOSIS — Z98.890 POSTOPERATIVE STATE: ICD-10-CM

## 2017-07-27 PROCEDURE — 99024 POSTOP FOLLOW-UP VISIT: CPT | Performed by: ORTHOPAEDIC SURGERY

## 2017-07-27 PROCEDURE — 73110 X-RAY EXAM OF WRIST: CPT | Mod: TC

## 2017-07-27 RX ORDER — OXYCODONE HYDROCHLORIDE 5 MG/1
5-10 CAPSULE ORAL EVERY 4 HOURS PRN
Qty: 60 CAPSULE | Refills: 0 | Status: SHIPPED | OUTPATIENT
Start: 2017-07-27 | End: 2017-08-03

## 2017-07-27 ASSESSMENT — PAIN SCALES - GENERAL: PAINLEVEL: EXTREME PAIN (8)

## 2017-07-27 NOTE — NURSING NOTE
"Chief Complaint   Patient presents with     Surgical Followup     DOS:7/18/17~Open reduction, internal fixation right distal radius fracture~9 days postop       Initial Temp 97.3  F (36.3  C) (Temporal)  Ht 1.725 m (5' 7.9\")  Wt 92.3 kg (203 lb 8 oz)  BMI 31.03 kg/m2 Estimated body mass index is 31.03 kg/(m^2) as calculated from the following:    Height as of this encounter: 1.725 m (5' 7.9\").    Weight as of this encounter: 92.3 kg (203 lb 8 oz).  Medication Reconciliation: complete    "

## 2017-07-27 NOTE — LETTER
7/27/2017         RE: Quan Murphy  [id number] [facility] [id number]        Dear Colleague,    Thank you for referring your patient, Quan Murphy, to the [facility]. Please see a copy of my visit note below.    Orthopedic Clinic Post-Operative Note    CHIEF COMPLAINT:   Chief Complaint   Patient presents with     Surgical Followup     DOS:7/18/17~Open reduction, internal fixation right distal radius fracture~9 days postop     Patient returns after ORIF of right distal radius repair from 7/18/17.      Patient states pain is slowly improving, he has no new concerns, does have feeling, function of fingers.  Patient states no drainage, no fevers, no loss of feeling, no loss of strength no new injury.  Patient states he does have mild numbness of thumb and index when compared to 3-5 fingers on the right hand but not worsening or significantly changed from pre-op.  Patient states complaint with keeping splint clean and dry.      Patient concerns: No    Patient's past medical, surgical, social and family histories reviewed.     Past Medical History:   Diagnosis Date     Allergy, unspecified not elsewhere classified     Seasonal allergies, pollen, dust, smoke and animals     Coronary atherosclerosis of unspecified type of vessel, native or graft     Coronary artery disease     Malignant neoplasm of prostate (H)     Prostate cancer       Past Surgical History:   Procedure Laterality Date     ARTHRODESIS FOOT  7/23/2013    Procedure: ARTHRODESIS FOOT;  Great Toe Arthrodesis Left Foot;  Surgeon: Ash Gonzalez DPM;  Location: [facility] OR     ARTHRODESIS FOOT  6/10/2014    Procedure: ARTHRODESIS FOOT;  Surgeon: Ash Gonzalez DPM;  Location: [facility] OR     C LAPAROSCOPY, SURGICAL PROSTATECTOMY, RETROPUBIC RADICAL, W/NERVE SPARING  11/30/2004    With full bilateral pelvic lymphadenectomy.  F-[facility].     C TOTAL KNEE ARTHROPLASTY  05/01/08    Left knee     COLONOSCOPY  10/7/2013    Procedure:  COLONOSCOPY;  Colonoscopy;  Surgeon: Mike Fallon MD;  Location: [facility] GI     HC CORRECT BUNION,SIMPLE  08/11/2005    x3     HC REMV TOE BENIGN BONE LESN  08/11/2005     HERNIORRHAPHY INGUINAL  7/3/2013    Procedure: HERNIORRHAPHY INGUINAL;  Open Repair Inguinal hernia Right with mesh ;  Surgeon: Sam Escobar MD;  Location: [facility] OR     MOHS MICROGRAPHIC PROCEDURE  08/23/11    ear and chin-[facility]     OPEN REDUCTION INTERNAL FIXATION WRIST Right 7/18/2017    Procedure: OPEN REDUCTION INTERNAL FIXATION WRIST;  Right distal radius open reduction and internal fixation;  Surgeon: Pedro Blanca DO;  Location: [facility]     RECONSTRUCT FOREFOOT WITH METATARSOPHALANGEAL (MTP) FUSION  6/10/2014    Procedure: RECONSTRUCT FOREFOOT WITH METATARSOPHALANGEAL (MTP) FUSION;  Surgeon: Ash Gonzalez DPM;  Location: [facility]     STENT, CORONARY, DEMI       SURGICAL HISTORY OF -   1999/1974    lt knee     SURGICAL HISTORY OF -   10/2004    lithotripsy     SURGICAL HISTORY OF - 11/05    angiogram with stent       Medications:    Current Outpatient Prescriptions on File Prior to Visit:  HYDROcodone-acetaminophen (NORCO) 7.5-325 MG per tablet Take 1 tablet by mouth 4 times daily as needed for moderate to severe pain   hydrOXYzine (VISTARIL) 25 MG capsule Take 1 capsule (25 mg) by mouth 3 times daily as needed for itching   oxyCODONE (ROXICODONE) 5 MG IR tablet Take 1-2 tablets (5-10 mg) by mouth every 4 hours as needed for moderate to severe pain   ALPRAZolam (XANAX) 0.5 MG tablet Take 1 tablet (0.5 mg) by mouth 3 times daily as needed for anxiety   zolpidem (AMBIEN) 10 MG tablet Take 1 tablet (10 mg) by mouth nightly as needed for sleep   rosuvastatin (CRESTOR) 40 MG tablet Take 1 tablet (40 mg) by mouth daily   clopidogrel (PLAVIX) 75 MG tablet Take 1 tablet (75 mg) by mouth daily   aspirin 81 MG EC tablet Take 1 tablet (81 mg) by mouth daily   nitroglycerin (NITROSTAT) 0.4 MG sublingual tablet For chest  "pain place 1 tablet under the tongue every 5 minutes for 3 doses. If symptoms persist 5 minutes after 1st dose call 911.   citalopram (CELEXA) 40 MG tablet Take 1 tablet (40 mg) by mouth daily   cetirizine (ZYRTEC) 10 MG tablet Take 10 mg by mouth daily   CIALIS 20 MG tablet Take 20 mg by mouth daily as needed      No current facility-administered medications on file prior to visit.     Allergies   Allergen Reactions     Animal Dander      Dust Mites      Pollen Extract      Smoke.        Social History     Occupational History     Not on file.     Social History Main Topics     Smoking status: Never Smoker     Smokeless tobacco: Never Used     Alcohol use 5.0 oz/week     10 Cans of beer per week      Comment: occasional      Drug use: No     Sexual activity: Yes     Partners: Female       Family History   Problem Relation Age of Onset     Hypertension Father      has had MI      Connective Tissue Disorder Mother      LUPUS     HEART DISEASE Mother      poor valve-needing replacement       REVIEW OF SYSTEMS  General: negative for, night sweats, dizziness, fatigue  Resp: No shortness of breath and no cough  CV: negative for chest pain, syncope or near-syncope  GI: negative for nausea, vomiting and diarrhea  : negative for dysuria and hematuria  Musculoskeletal: as above  Neurologic: negative for syncope   Hematologic: negative for bleeding disorder    Physical Exam:  Vitals: Temp 97.3  F (36.3  C) (Temporal)  Ht 1.725 m (5' 7.9\")  Wt 92.3 kg (203 lb 8 oz)  BMI 31.03 kg/m2  BMI= Body mass index is 31.03 kg/(m^2).  Constitutional: healthy, alert and no acute distress   Psychiatric: mentation appears normal and affect normal/bright  NEURO: no focal deficits  SKIN: Splint and padding removed.  Skin is without signs of infection including no erythema, incision breakdown or purlent drainage except  proximal portion of incision is slightly opened/dehisced at the superficial layer, approximately 2mm wide by 1cm.  No " drainage. No erythema, non-tender, no s/s of infection.    JOINT/EXTREMITIES: right hand mild swelling to thumb, index finger, does have feeling but slightly decreased.  Cap refill <2, skin pink warm dry.  Does have ROM to fingers intact but tender with full ROM.      Diagnostic Modalities:  right forearm X-ray: previous fracture has been reduced with hardware in place.  No evidence of hardware failure or malunion on xray.  Independent visualization of the images was performed.      Impression:   Chief Complaint   Patient presents with     Surgical Followup     DOS:7/18/17~Open reduction, internal fixation right distal radius fracture~9 days postop   Patient overall doing well, pain is decreasing, no concerns.      Plan:   Activity: non-weight bearing to right arm, can have ROM to fingers  Staples/Sutures out: steri strips used to reinforce approximation of wound.  Tails of monocryl left in place.  Pain controlled: Yes. Continue to use: oxycodone, refill provided, ICE, rest.   Immobilzation: yes with wrist splint  Physical Therapy: will likely start after next visit  Rest, Ice, Elevation  Return to clinic 1, week(s), or sooner as needed for changes.    Re-x-ray on return: No    Scribed by:  Tereso Carroll, MARCY, CNP, DNP  10:22 AM  7/27/2017           Amor Blanca D.O.    Again, thank you for allowing me to participate in the care of your patient.        Sincerely,        Pedro Blanca, DO

## 2017-07-27 NOTE — PROGRESS NOTES
Orthopedic Clinic Post-Operative Note    CHIEF COMPLAINT:   Chief Complaint   Patient presents with     Surgical Followup     DOS:7/18/17~Open reduction, internal fixation right distal radius fracture~9 days postop     Patient returns after ORIF of right distal radius repair from 7/18/17.      Patient states pain is slowly improving, he has no new concerns, does have feeling, function of fingers.  Patient states no drainage, no fevers, no loss of feeling, no loss of strength no new injury.  Patient states he does have mild numbness of thumb and index when compared to 3-5 fingers on the right hand but not worsening or significantly changed from pre-op.  Patient states complaint with keeping splint clean and dry.      Patient concerns: No    Patient's past medical, surgical, social and family histories reviewed.     Past Medical History:   Diagnosis Date     Allergy, unspecified not elsewhere classified     Seasonal allergies, pollen, dust, smoke and animals     Coronary atherosclerosis of unspecified type of vessel, native or graft     Coronary artery disease     Malignant neoplasm of prostate (H)     Prostate cancer       Past Surgical History:   Procedure Laterality Date     ARTHRODESIS FOOT  7/23/2013    Procedure: ARTHRODESIS FOOT;  Great Toe Arthrodesis Left Foot;  Surgeon: Ash Gonzalez DPM;  [facility]     ARTHRODESIS FOOT  6/10/2014    Procedure: ARTHRODESIS FOOT;  Surgeon: Ash Gonzalez DPM;  [facility]     C LAPAROSCOPY, SURGICAL PROSTATECTOMY, RETROPUBIC RADICAL, W/NERVE SPARING  11/30/2004    With full bilateral pelvic lymphadenectomy.  F-[facility].     C TOTAL KNEE ARTHROPLASTY  05/01/08    Left knee     COLONOSCOPY  10/7/2013    Procedure: COLONOSCOPY;  Colonoscopy;  Surgeon: Mike Fallon MD;  Location:  GI     HC CORRECT BUNION,SIMPLE  08/11/2005    x3     HC REMV TOE BENIGN BONE LESN  08/11/2005     HERNIORRHAPHY INGUINAL  7/3/2013    Procedure: HERNIORRHAPHY INGUINAL;  Open  Repair Inguinal hernia Right with mesh ;  Surgeon: Sam Escobar MD;  Location: [facility]     MOHS MICROGRAPHIC PROCEDURE  08/23/11    ear and chin-[facility]     OPEN REDUCTION INTERNAL FIXATION WRIST Right 7/18/2017    Procedure: OPEN REDUCTION INTERNAL FIXATION WRIST;  Right distal radius open reduction and internal fixation;  Surgeon: Pedro Blanca DO;  [facility]: [facility]     RECONSTRUCT FOREFOOT WITH METATARSOPHALANGEAL (MTP) FUSION  6/10/2014    Procedure: RECONSTRUCT FOREFOOT WITH METATARSOPHALANGEAL (MTP) FUSION;  Surgeon: Ash Gonzalez DPM;  [facility]: [facility]     STENT, CORONARY, DEMI       SURGICAL HISTORY OF -   1999/1974    lt knee     SURGICAL HISTORY OF -   10/2004    lithotripsy     SURGICAL HISTORY OF - 11/05    angiogram with stent       Medications:    Current Outpatient Prescriptions on File Prior to Visit:  HYDROcodone-acetaminophen (NORCO) 7.5-325 MG per tablet Take 1 tablet by mouth 4 times daily as needed for moderate to severe pain   hydrOXYzine (VISTARIL) 25 MG capsule Take 1 capsule (25 mg) by mouth 3 times daily as needed for itching   oxyCODONE (ROXICODONE) 5 MG IR tablet Take 1-2 tablets (5-10 mg) by mouth every 4 hours as needed for moderate to severe pain   ALPRAZolam (XANAX) 0.5 MG tablet Take 1 tablet (0.5 mg) by mouth 3 times daily as needed for anxiety   zolpidem (AMBIEN) 10 MG tablet Take 1 tablet (10 mg) by mouth nightly as needed for sleep   rosuvastatin (CRESTOR) 40 MG tablet Take 1 tablet (40 mg) by mouth daily   clopidogrel (PLAVIX) 75 MG tablet Take 1 tablet (75 mg) by mouth daily   aspirin 81 MG EC tablet Take 1 tablet (81 mg) by mouth daily   nitroglycerin (NITROSTAT) 0.4 MG sublingual tablet For chest pain place 1 tablet under the tongue every 5 minutes for 3 doses. If symptoms persist 5 minutes after 1st dose call 911.   citalopram (CELEXA) 40 MG tablet Take 1 tablet (40 mg) by mouth daily   cetirizine (ZYRTEC) 10 MG tablet Take 10 mg by mouth  "daily   CIALIS 20 MG tablet Take 20 mg by mouth daily as needed      No current facility-administered medications on file prior to visit.     Allergies   Allergen Reactions     Animal Dander      Dust Mites      Pollen Extract      Smoke.        Social History     Occupational History     Not on file.     Social History Main Topics     Smoking status: Never Smoker     Smokeless tobacco: Never Used     Alcohol use 5.0 oz/week     10 Cans of beer per week      Comment: occasional      Drug use: No     Sexual activity: Yes     Partners: Female       Family History   Problem Relation Age of Onset     Hypertension Father      has had MI      Connective Tissue Disorder Mother      LUPUS     HEART DISEASE Mother      poor valve-needing replacement       REVIEW OF SYSTEMS  General: negative for, night sweats, dizziness, fatigue  Resp: No shortness of breath and no cough  CV: negative for chest pain, syncope or near-syncope  GI: negative for nausea, vomiting and diarrhea  : negative for dysuria and hematuria  Musculoskeletal: as above  Neurologic: negative for syncope   Hematologic: negative for bleeding disorder    Physical Exam:  Vitals: Temp 97.3  F (36.3  C) (Temporal)  Ht 1.725 m (5' 7.9\")  Wt 92.3 kg (203 lb 8 oz)  BMI 31.03 kg/m2  BMI= Body mass index is 31.03 kg/(m^2).  Constitutional: healthy, alert and no acute distress   Psychiatric: mentation appears normal and affect normal/bright  NEURO: no focal deficits  SKIN: Splint and padding removed.  Skin is without signs of infection including no erythema, incision breakdown or purlent drainage except  proximal portion of incision is slightly opened/dehisced at the superficial layer, approximately 2mm wide by 1cm.  No drainage. No erythema, non-tender, no s/s of infection.    JOINT/EXTREMITIES: right hand mild swelling to thumb, index finger, does have feeling but slightly decreased.  Cap refill <2, skin pink warm dry.  Does have ROM to fingers intact but tender " with full ROM.      Diagnostic Modalities:  right forearm X-ray: previous fracture has been reduced with hardware in place.  No evidence of hardware failure or malunion on xray.  Independent visualization of the images was performed.      Impression:   Chief Complaint   Patient presents with     Surgical Followup     DOS:7/18/17~Open reduction, internal fixation right distal radius fracture~9 days postop   Patient overall doing well, pain is decreasing, no concerns.      Plan:   Activity: non-weight bearing to right arm, can have ROM to fingers  Staples/Sutures out: steri strips used to reinforce approximation of wound.  Tails of monocryl left in place.  Pain controlled: Yes. Continue to use: oxycodone, refill provided, ICE, rest.   Immobilzation: yes with wrist splint  Physical Therapy: will likely start after next visit  Rest, Ice, Elevation  Return to clinic 1, week(s), or sooner as needed for changes.    Re-x-ray on return: No    Scribed by:  MARCY Dodson, CNP, DNP  10:22 AM  7/27/2017           Amor Blanca D.O.

## 2017-07-27 NOTE — MR AVS SNAPSHOT
After Visit Summary   7/27/2017    Quan Murphy    MRN: [id number]           Patient Information     Date Of Birth          1950        Visit Information        Provider Department      7/27/2017 9:20 AM Pedro Blanca DO [facility]        Today's Diagnoses     Closed Colles' fracture of right radius with routine healing, subsequent encounter    -  1    Postoperative state           Follow-ups after your visit        Your next 10 appointments already scheduled     Aug 03, 2017 11:30 AM CDT   Return Visit with Pedro Blanca DO   [facility]    [address] [place] [address] [id number] [phone]              Who to contact     If you have questions or need follow up information about today's clinic visit or your schedule please contact [facility] directly at [phone].  Normal or non-critical lab and imaging results will be communicated to you by Moblyhart, letter or phone within 4 business days after the clinic has received the results. If you do not hear from us within 7 days, please contact the clinic through Moblyhart or phone. If you have a critical or abnormal lab result, we will notify you by phone as soon as possible.  Submit refill requests through [employer] or call your pharmacy and they will forward the refill request to us. Please allow 3 business days for your refill to be completed.          Additional Information About Your Visit        MyChart Information     [employer] gives you secure access to your electronic health record. If you see a primary care provider, you can also send messages to your care team and make appointments. If you have questions, please call your primary care clinic.  If you do not have a primary care provider, please call [phone] and they will assist you.        Care EveryWhere ID     This is your Care EveryWhere ID. This could be used by other  "organizations to access your [facility] medical records  [id number]        Your Vitals Were     Temperature Height BMI (Body Mass Index)             97.3  F (36.3  C) (Temporal) 5' 7.9\" (1.725 m) 31.03 kg/m2          Blood Pressure from Last 3 Encounters:   07/18/17 122/70   07/17/17 118/80   07/14/17 121/83    Weight from Last 3 Encounters:   07/27/17 203 lb 8 oz (92.3 kg)   07/17/17 204 lb (92.5 kg)   07/17/17 204 lb (92.5 kg)                 Where to get your medicines      Some of these will need a paper prescription and others can be bought over the counter.  Ask your nurse if you have questions.     Bring a paper prescription for each of these medications     oxyCODONE 5 MG capsule          Primary Care Provider Office Phone # Fax #    Jose Hernandez [phone]       [facility] [address] [place] [address]        Equal Access to Services     MAGGIE HARRIS : Hadii linda ku hadasho Soomaali, waaxda luqadaha, qaybta kaalmada adeegyada, waxay padmajain anabell cox. So [facility] [phone].    ATENCIÓN: Si habla español, tiene a recinos disposición servicios gratuitos de asistencia lingüística. Llame al [phone].    We comply with applicable federal civil rights laws and [place] laws. We do not discriminate on the basis of race, color, national origin, age, disability sex, sexual orientation or gender identity.            Thank you!     Thank you for choosing [facility]  for your care. Our goal is always to provide you with excellent care. Hearing back from our patients is one way we can continue to improve our services. Please take a few minutes to complete the written survey that you may receive in the mail after your visit with us. Thank you!             Your Updated Medication List - Protect others around you: Learn how to safely use, store and throw away your medicines at www.disposemymeds.org.          This list is accurate as of: " 7/27/17 10:32 AM.  Always use your most recent med list.                   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)       cetirizine 10 MG tablet    zyrTEC     Take 10 mg by mouth daily        CIALIS 20 MG tablet   Generic drug:  tadalafil      Take 20 mg by mouth daily as needed        citalopram 40 MG tablet    celeXA    30 tablet    Take 1 tablet (40 mg) by mouth daily    Anxiety       clopidogrel 75 MG tablet    PLAVIX    90 tablet    Take 1 tablet (75 mg) by mouth daily    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 If symptoms persist 5 minutes after 1st dose call 911.    Atherosclerosis of native coronary artery of native heart with unstable angina pectoris (H)       * oxyCODONE 5 MG IR tablet    ROXICODONE    20 tablet    Take 1-2 tablets (5-10 mg) by mouth every 4 hours as needed for moderate to severe pain    Closed Colles' fracture of right radius, initial encounter       * oxyCODONE 5 MG capsule    OXY-IR    60 capsule    Take 1-2 capsules (5-10 mg) by mouth every 4 hours as needed for moderate to severe pain     Closed Colles' fracture of right radius with routine healing, subsequent encounter       rosuvastatin 40 MG tablet    CRESTOR    90 tablet    Take 1 tablet (40 mg) by mouth daily    Hyperlipidemia LDL goal <130       zolpidem 10 MG tablet    AMBIEN    30 tablet    Take 1 tablet (10 mg) by mouth nightly as needed for sleep    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, insomnia type       * Notice:  This list has 2 medication(s) that are the same as other medications prescribed for you. Read the directions carefully, and ask your doctor or other care provider to review them with you.

## 2017-08-03 ENCOUNTER — OFFICE VISIT (OUTPATIENT)
Dept: ORTHOPEDICS | Facility: CLINIC | Age: 67
End: 2017-08-03
Payer: MEDICARE

## 2017-08-03 VITALS — TEMPERATURE: 97.2 F | HEIGHT: 68 IN | BODY MASS INDEX: 30.77 KG/M2 | WEIGHT: 203 LBS

## 2017-08-03 DIAGNOSIS — S52.531D CLOSED COLLES' FRACTURE OF RIGHT RADIUS WITH ROUTINE HEALING, SUBSEQUENT ENCOUNTER: ICD-10-CM

## 2017-08-03 DIAGNOSIS — Z98.890 S/P ORIF (OPEN REDUCTION INTERNAL FIXATION) FRACTURE: Primary | ICD-10-CM

## 2017-08-03 DIAGNOSIS — Z87.81 S/P ORIF (OPEN REDUCTION INTERNAL FIXATION) FRACTURE: Primary | ICD-10-CM

## 2017-08-03 PROCEDURE — 99024 POSTOP FOLLOW-UP VISIT: CPT | Performed by: ORTHOPAEDIC SURGERY

## 2017-08-03 RX ORDER — OXYCODONE HYDROCHLORIDE 5 MG/1
5-10 CAPSULE ORAL EVERY 8 HOURS PRN
Qty: 50 CAPSULE | Refills: 0 | Status: ON HOLD | OUTPATIENT
Start: 2017-08-03 | End: 2017-10-18

## 2017-08-03 RX ORDER — HYDROXYZINE PAMOATE 25 MG/1
25 CAPSULE ORAL 3 TIMES DAILY PRN
Qty: 60 CAPSULE | Refills: 0 | Status: SHIPPED | OUTPATIENT
Start: 2017-08-03 | End: 2017-10-16

## 2017-08-03 ASSESSMENT — PAIN SCALES - GENERAL: PAINLEVEL: SEVERE PAIN (7)

## 2017-08-03 NOTE — NURSING NOTE
"Chief Complaint   Patient presents with     RECHECK     Right wrist follow up- DOI: 7/14/17     Surgical Followup      DOS:7/18/17~Open reduction, internal fixation right distal radius fracture~2 weeks postop       Initial Temp 97.2  F (36.2  C) (Temporal)  Ht 1.725 m (5' 7.9\")  Wt 92.1 kg (203 lb)  BMI 30.96 kg/m2 Estimated body mass index is 30.96 kg/(m^2) as calculated from the following:    Height as of this encounter: 1.725 m (5' 7.9\").    Weight as of this encounter: 92.1 kg (203 lb).  Medication Reconciliation: complete   [place]/ALICIA     "

## 2017-08-03 NOTE — PROGRESS NOTES
Orthopedic Clinic Post-Operative Note    CHIEF COMPLAINT:   Chief Complaint   Patient presents with     RECHECK     Right wrist follow up- DOI: 7/14/17     Surgical Followup      DOS:7/18/17~Open reduction, internal fixation right distal radius fracture~2 weeks postop       Today:  S/p ORIF of right distal radius fracture. Now 16 days post-op. Patient states the wrist pain is improved, the tailbone is what hurts primarily.  He describes as center low back, is a bruise, no breakdown.  He states it is getting better slowly but continues to hurt.  Otherwise the mild numbness of thumb and index is slowly improving.  He states has started to shower now and incision is no longer draining.  No incision or wrist concerns states the brace is fitting well and comfortable.  Ready to start hand therapy.    No patient concerns.     7/27/17 visit:  Patient returns after ORIF of right distal radius repair from 7/18/17.       Patient states pain is slowly improving, he has no new concerns, does have feeling, function of fingers.  Patient states no drainage, no fevers, no loss of feeling, no loss of strength no new injury.  Patient states he does have mild numbness of thumb and index when compared to 3-5 fingers on the right hand but not worsening or significantly changed from pre-op.  Patient states complaint with keeping splint clean and dry.       Patient concerns: No      Patient's past medical, surgical, social and family histories reviewed.     Past Medical History:   Diagnosis Date     Allergy, unspecified not elsewhere classified     Seasonal allergies, pollen, dust, smoke and animals     Coronary atherosclerosis of unspecified type of vessel, native or graft     Coronary artery disease     Malignant neoplasm of prostate (H)     Prostate cancer       Past Surgical History:   Procedure Laterality Date     ARTHRODESIS FOOT  7/23/2013    Procedure: ARTHRODESIS FOOT;  Great Toe Arthrodesis Left Foot;  Surgeon: Ash Gonzalez  LUIS Champion;  [facility]     ARTHRODESIS FOOT  6/10/2014    Procedure: ARTHRODESIS FOOT;  Surgeon: Ash Gonzalez DPM;  [facility]     C LAPAROSCOPY, SURGICAL PROSTATECTOMY, RETROPUBIC RADICAL, W/NERVE SPARING  11/30/2004    With full bilateral pelvic lymphadenectomy.  F-[facility].     C TOTAL KNEE ARTHROPLASTY  05/01/08    Left knee     COLONOSCOPY  10/7/2013    Procedure: COLONOSCOPY;  Colonoscopy;  Surgeon: Mike Fallon MD;  [facility]     HC CORRECT BUNION,SIMPLE  08/11/2005    x3     HC REMV TOE BENIGN BONE LESN  08/11/2005     HERNIORRHAPHY INGUINAL  7/3/2013    Procedure: HERNIORRHAPHY INGUINAL;  Open Repair Inguinal hernia Right with mesh ;  Surgeon: Sam Escobar MD;  [facility]     MOHS MICROGRAPHIC PROCEDURE  08/23/11    ear and chin-[facility]     OPEN REDUCTION INTERNAL FIXATION WRIST Right 7/18/2017    Procedure: OPEN REDUCTION INTERNAL FIXATION WRIST;  Right distal radius open reduction and internal fixation;  Surgeon: Pedro Blanca DO;  [facility]     RECONSTRUCT FOREFOOT WITH METATARSOPHALANGEAL (MTP) FUSION  6/10/2014    Procedure: RECONSTRUCT FOREFOOT WITH METATARSOPHALANGEAL (MTP) FUSION;  Surgeon: Ash Gonzalez DPM;  [facility]     STENT, CORONARY, DEMI       SURGICAL HISTORY OF -   1999/1974    lt knee     SURGICAL HISTORY OF -   10/2004    lithotripsy     SURGICAL HISTORY OF - 11/05    angiogram with stent       Medications:    Current Outpatient Prescriptions on File Prior to Visit:  oxyCODONE (OXY-IR) 5 MG capsule Take 1-2 capsules (5-10 mg) by mouth every 4 hours as needed for moderate to severe pain   HYDROcodone-acetaminophen (NORCO) 7.5-325 MG per tablet Take 1 tablet by mouth 4 times daily as needed for moderate to severe pain   hydrOXYzine (VISTARIL) 25 MG capsule Take 1 capsule (25 mg) by mouth 3 times daily as needed for itching   oxyCODONE (ROXICODONE) 5 MG IR tablet Take 1-2 tablets (5-10 mg) by mouth every 4  hours as needed for moderate to severe pain   ALPRAZolam (XANAX) 0.5 MG tablet Take 1 tablet (0.5 mg) by mouth 3 times daily as needed for anxiety   zolpidem (AMBIEN) 10 MG tablet Take 1 tablet (10 mg) by mouth nightly as needed for sleep   rosuvastatin (CRESTOR) 40 MG tablet Take 1 tablet (40 mg) by mouth daily   clopidogrel (PLAVIX) 75 MG tablet Take 1 tablet (75 mg) by mouth daily   aspirin 81 MG EC tablet Take 1 tablet (81 mg) by mouth daily   nitroglycerin (NITROSTAT) 0.4 MG sublingual tablet For chest pain place 1 tablet under the tongue every 5 minutes for 3 doses. If symptoms persist 5 minutes after 1st dose call 911.   citalopram (CELEXA) 40 MG tablet Take 1 tablet (40 mg) by mouth daily   cetirizine (ZYRTEC) 10 MG tablet Take 10 mg by mouth daily   CIALIS 20 MG tablet Take 20 mg by mouth daily as needed      No current facility-administered medications on file prior to visit.     Allergies   Allergen Reactions     Animal Dander      Dust Mites      Pollen Extract      Smoke.        Social History     Occupational History     Not on file.     Social History Main Topics     Smoking status: Never Smoker     Smokeless tobacco: Never Used     Alcohol use 5.0 oz/week     10 Cans of beer per week      Comment: occasional      Drug use: No     Sexual activity: Yes     Partners: Female       Family History   Problem Relation Age of Onset     Hypertension Father      has had MI      Connective Tissue Disorder Mother      LUPUS     HEART DISEASE Mother      poor valve-needing replacement       SKIN: .well approximated skin edges, without signs of infection including no erythema, incision breakdown or purlent drainage. Steri strips in place and starting to come off. No drainage.   JOINT/EXTREMITIES: Wrist Exam: WRIST:  Inspection: no swelling  Palpation: Tender: diffusely around wrist, distal radius  Non-tender: non tender otherwise  Range of Motion: limited by pain.  Is able to supinate and pronate.  Range of motion  limited by pain and stiffness  Strength:  strength weaker on operative side then non-operative, improved since last visit  right hand decreased swelling to thumb, index finger, does have feeling but slightly decreased.  Cap refill <2, skin pink warm dry.  Does have ROM to fingers intact but tender with full ROM.      Diagnostic Modalities:  None today.  Independent visualization of the images was performed.      Impression: Chief Complaint   Patient presents with     RECHECK     Right wrist follow up- DOI: 7/14/17     Surgical Followup      DOS:7/18/17~Open reduction, internal fixation right distal radius fracture~2 weeks postop   Patient overall doing well, pain is decreasing, no concerns. Improved from last visit.     Plan:   Will start OT hand therapy for gentle range of motion.  Recommended against weight bearing yet.  Also recommended patient speak to PCP if tailbone continues to bother him or if there are new symptoms.    Activity: Upper extremity:  no lifting, pushing or pulling and ok for full PROM/AROM but no resistence  Staples/Sutures out: Not applicable.  Pain controlled: Yes. Continue to use: rest, Ice, Oxycodone, brace  Immobilzation: No  Physical Therapy: Begin/continue physical therapy. Work on:  gentle range of motion, hand therapy with OT  Rest, Ice, Elevation, Compression  Return to clinic 2, week(s), or sooner as needed for changes.    Re-x-ray on return: Yes.    Scribed by:  MARCY Dodson, CNP, DNP  9:17 AM  8/3/2017        Amor Blanca D.O.

## 2017-08-03 NOTE — LETTER
8/3/2017         RE: Quan Murphy  [id number] [facility] [address]        Dear Colleague,    Thank you for referring your patient, Quan Murphy, to the [facility]. Please see a copy of my visit note below.    Orthopedic Clinic Post-Operative Note    CHIEF COMPLAINT:   Chief Complaint   Patient presents with     RECHECK     Right wrist follow up- DOI: 7/14/17     Surgical Followup      DOS:7/18/17~Open reduction, internal fixation right distal radius fracture~2 weeks postop       Today:  S/p ORIF of right distal radius fracture. Now 16 days post-op. Patient states the wrist pain is improved, the tailbone is what hurts primarily.  He describes as center low back, is a bruise, no breakdown.  He states it is getting better slowly but continues to hurt.  Otherwise the mild numbness of thumb and index is slowly improving.  He states has started to shower now and incision is no longer draining.  No incision or wrist concerns states the brace is fitting well and comfortable.  Ready to start hand therapy.    No patient concerns.     7/27/17 visit:  Patient returns after ORIF of right distal radius repair from 7/18/17.       Patient states pain is slowly improving, he has no new concerns, does have feeling, function of fingers.  Patient states no drainage, no fevers, no loss of feeling, no loss of strength no new injury.  Patient states he does have mild numbness of thumb and index when compared to 3-5 fingers on the right hand but not worsening or significantly changed from pre-op.  Patient states complaint with keeping splint clean and dry.       Patient concerns: No      Patient's past medical, surgical, social and family histories reviewed.     Past Medical History:   Diagnosis Date     Allergy, unspecified not elsewhere classified     Seasonal allergies, pollen, dust, smoke and animals     Coronary atherosclerosis of unspecified type of vessel, native or graft     Coronary artery disease      Malignant neoplasm of prostate (H)     Prostate cancer       Past Surgical History:   Procedure Laterality Date     ARTHRODESIS FOOT  7/23/2013    Procedure: ARTHRODESIS FOOT;  Great Toe Arthrodesis Left Foot;  Surgeon: Ash Gonzalez DPM;  [facility]     ARTHRODESIS FOOT  6/10/2014    Procedure: ARTHRODESIS FOOT;  Surgeon: Ash Gonzalez DPM;  [facility]     C LAPAROSCOPY, SURGICAL PROSTATECTOMY, RETROPUBIC RADICAL, W/NERVE SPARING  11/30/2004    With full bilateral pelvic lymphadenectomy.  F[facility].     C TOTAL KNEE ARTHROPLASTY  05/01/08    Left knee     COLONOSCOPY  10/7/2013    Procedure: COLONOSCOPY;  Colonoscopy;  Surgeon: Mike Fallon MD;  [facility]     HC CORRECT BUNION,SIMPLE  08/11/2005    x3     HC REMV TOE BENIGN BONE LESN  08/11/2005     HERNIORRHAPHY INGUINAL  7/3/2013    Procedure: HERNIORRHAPHY INGUINAL;  Open Repair Inguinal hernia Right with mesh ;  Surgeon: Sam Escobar MD;  [facility]     MOHS MICROGRAPHIC PROCEDURE  08/23/11    ear and chin-[facility]     OPEN REDUCTION INTERNAL FIXATION WRIST Right 7/18/2017    Procedure: OPEN REDUCTION INTERNAL FIXATION WRIST;  Right distal radius open reduction and internal fixation;  Surgeon: Pedro Blanca DO;  [facility]     RECONSTRUCT FOREFOOT WITH METATARSOPHALANGEAL (MTP) FUSION  6/10/2014    Procedure: RECONSTRUCT FOREFOOT WITH METATARSOPHALANGEAL (MTP) FUSION;  Surgeon: Ash Gonzalez DPM;  [facility]     STENT, CORONARY, DEMI       SURGICAL HISTORY OF -   1999/1974    lt knee     SURGICAL HISTORY OF -   10/2004    lithotripsy     SURGICAL HISTORY OF - 11/05    angiogram with stent       Medications:    Current Outpatient Prescriptions on File Prior to Visit:  oxyCODONE (OXY-IR) 5 MG capsule Take 1-2 capsules (5-10 mg) by mouth every 4 hours as needed for moderate to severe pain   HYDROcodone-acetaminophen (NORCO) 7.5-325 MG per tablet Take 1 tablet by mouth 4 times  daily as needed for moderate to severe pain   hydrOXYzine (VISTARIL) 25 MG capsule Take 1 capsule (25 mg) by mouth 3 times daily as needed for itching   oxyCODONE (ROXICODONE) 5 MG IR tablet Take 1-2 tablets (5-10 mg) by mouth every 4 hours as needed for moderate to severe pain   ALPRAZolam (XANAX) 0.5 MG tablet Take 1 tablet (0.5 mg) by mouth 3 times daily as needed for anxiety   zolpidem (AMBIEN) 10 MG tablet Take 1 tablet (10 mg) by mouth nightly as needed for sleep   rosuvastatin (CRESTOR) 40 MG tablet Take 1 tablet (40 mg) by mouth daily   clopidogrel (PLAVIX) 75 MG tablet Take 1 tablet (75 mg) by mouth daily   aspirin 81 MG EC tablet Take 1 tablet (81 mg) by mouth daily   nitroglycerin (NITROSTAT) 0.4 MG sublingual tablet For chest pain place 1 tablet under the tongue every 5 minutes for 3 doses. If symptoms persist 5 minutes after 1st dose call 911.   citalopram (CELEXA) 40 MG tablet Take 1 tablet (40 mg) by mouth daily   cetirizine (ZYRTEC) 10 MG tablet Take 10 mg by mouth daily   CIALIS 20 MG tablet Take 20 mg by mouth daily as needed      No current facility-administered medications on file prior to visit.     Allergies   Allergen Reactions     Animal Dander      Dust Mites      Pollen Extract      Smoke.        Social History     Occupational History     Not on file.     Social History Main Topics     Smoking status: Never Smoker     Smokeless tobacco: Never Used     Alcohol use 5.0 oz/week     10 Cans of beer per week      Comment: occasional      Drug use: No     Sexual activity: Yes     Partners: Female       Family History   Problem Relation Age of Onset     Hypertension Father      has had MI      Connective Tissue Disorder Mother      LUPUS     HEART DISEASE Mother      poor valve-needing replacement       SKIN: .well approximated skin edges, without signs of infection including no erythema, incision breakdown or purlent drainage. Steri strips in place and starting to come off. No drainage.    JOINT/EXTREMITIES: Wrist Exam: WRIST:  Inspection: no swelling  Palpation: Tender: diffusely around wrist, distal radius  Non-tender: non tender otherwise  Range of Motion: limited by pain.  Is able to supinate and pronate.  Range of motion limited by pain and stiffness  Strength:  strength weaker on operative side then non-operative, improved since last visit  right hand decreased swelling to thumb, index finger, does have feeling but slightly decreased.  Cap refill <2, skin pink warm dry.  Does have ROM to fingers intact but tender with full ROM.      Diagnostic Modalities:  None today.  Independent visualization of the images was performed.      Impression: Chief Complaint   Patient presents with     RECHECK     Right wrist follow up- DOI: 7/14/17     Surgical Followup      DOS:7/18/17~Open reduction, internal fixation right distal radius fracture~2 weeks postop   Patient overall doing well, pain is decreasing, no concerns. Improved from last visit.     Plan:   Will start OT hand therapy for gentle range of motion.  Recommended against weight bearing yet.  Also recommended patient speak to PCP if tailbone continues to bother him or if there are new symptoms.    Activity: Upper extremity:  no lifting, pushing or pulling and ok for full PROM/AROM but no resistence  Staples/Sutures out: Not applicable.  Pain controlled: Yes. Continue to use: rest, Ice, Oxycodone, brace  Immobilzation: No  Physical Therapy: Begin/continue physical therapy. Work on:  gentle range of motion, hand therapy with OT  Rest, Ice, Elevation, Compression  Return to clinic 2, week(s), or sooner as needed for changes.    Re-x-ray on return: Yes.    Scribed by:  MARCY Dodson, CNP, DNP  9:17 AM  8/3/2017        Amor Blanca D.O.    Again, thank you for allowing me to participate in the care of your patient.        Sincerely,        Pedro Blanca, DO

## 2017-08-03 NOTE — MR AVS SNAPSHOT
"              After Visit Summary   8/3/2017    Quan Murphy    MRN: [id number]           Patient Information     Date Of Birth          1950        Visit Information        Provider Department      8/3/2017 9:10 AM Pedro Blanca DO [facility]        Today's Diagnoses     S/P ORIF (open reduction internal fixation) fracture    -  1    Closed Colles' fracture of right radius with routine healing, subsequent encounter           Follow-ups after your visit        Additional Services     OCCUPATIONAL THERAPY REFERRAL       *This therapy referral will be filtered to a centralized scheduling office at [facility] and the patient will receive a call to schedule an appointment at a [facility] location most convenient for them. *     Hand therapy, gentle ROM.  2 weeks s/p ORIF of right distal radius    [facility] provides Occupational Therapy evaluation and treatment and many specialty services across the [facility] system.  If requesting a specialty program, please choose from the list below.    If you have not heard from the scheduling office within 2 business days, please call [phone] for all locations, with the exception of [place], please call [phone].     Treatment: Evaluation & Treatment  Special Instructions/Modalities: work on gentle ROM , no resistance  DOI: 7/14/17  Special Programs:   Please be aware that coverage of these services is subject to the terms and limitations of your health insurance plan.  Call member services at your health plan with any benefit or coverage questions.      **Note to Provider:  If you are referring outside of [facility] for the therapy appointment, please list the name of the location in the \"special instructions\" above, print the referral and give to the patient to schedule the appointment.                  Your next 10 appointments already scheduled     Aug 17, 2017 11:30 AM CDT   [facility] with " "[facility], [facility]    [address] [address] [phone]              Who to contact     If you have questions or need follow up information about today's clinic visit or your schedule please contact [facility] directly at [phone].  Normal or non-critical lab and imaging results will be communicated to you by MyChart, letter or phone within 4 business days after the clinic has received the results. If you do not hear from us within 7 days, please contact the clinic through [employer] or phone. If you have a critical or abnormal lab result, we will notify you by phone as soon as possible.  Submit refill requests through [employer] or call your pharmacy and they will forward the refill request to us. Please allow 3 business days for your refill to be completed.          Additional Information About Your Visit        [employer] Information     [employer] gives you secure access to your electronic health record. If you see a primary care provider, you can also send messages to your care team and make appointments. If you have questions, please call your primary care clinic.  If you do not have a primary care provider, please call [phone] and they will assist you.        Care EveryWhere ID     This is your Care EveryWhere ID. This could be used by other organizations to access your [facility] medical records  [id number]        Your Vitals Were     Temperature Height BMI (Body Mass Index)             97.2  F (36.2  C) (Temporal) 5' 7.9\" (1.725 m) 30.96 kg/m2          Blood Pressure from Last 3 Encounters:   07/18/17 122/70   07/17/17 118/80   07/14/17 121/83    Weight from Last 3 Encounters:   08/03/17 203 lb (92.1 kg)   07/27/17 203 lb 8 oz (92.3 kg)   07/17/17 204 lb (92.5 kg)              We Performed the Following     OCCUPATIONAL THERAPY REFERRAL          Today's Medication Changes          These changes are " accurate as of: 8/3/17  9:47 AM.  If you have any questions, ask your nurse or doctor.               These medicines have changed or have updated prescriptions.        Dose/Directions    oxyCODONE 5 MG capsule   Commonly known as:  OXY-IR   This may have changed:  when to take this   Used for:  Closed Colles' fracture of right radius with routine healing, subsequent encounter   Changed by:  Pedro Blanca,         Dose:  5-10 mg   Take 1-2 capsules (5-10 mg) by mouth every 8 hours as needed for moderate to severe pain   Quantity:  50 capsule   Refills:  0            Where to get your medicines      Some of these will need a paper prescription and others can be bought over the counter.  Ask your nurse if you have questions.     Bring a paper prescription for each of these medications     hydrOXYzine 25 MG capsule    oxyCODONE 5 MG capsule                Primary Care Provider Office Phone # Fax #    Joes Hernandez [phone]       [facility] [address] [place] [address]        Equal Access to Services     [facility] : Hadii aad ku hadasho Soomaali, waaxda luqadaha, qaybta kaalmada adeegyada, waxay padmajain haychely hooper . So [facility] [phone].    ATENCIÓN: Si habla español, tiene a recinos disposición servicios gratuitos de asistencia lingüística. [facility] [phone].    We comply with applicable federal civil rights laws and [place] laws. We do not discriminate on the basis of race, color, national origin, age, disability sex, sexual orientation or gender identity.            Thank you!     Thank you for choosing [facility]  for your care. Our goal is always to provide you with excellent care. Hearing back from our patients is one way we can continue to improve our services. Please take a few minutes to complete the written survey that you may receive in the mail after your visit with us. Thank you!             Your Updated  Medication List - Protect others around you: Learn how to safely use, store and throw away your medicines at www.disposemymeds.org.          This list is accurate as of: 8/3/17  9:47 AM.  Always use your most recent med list.                   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)       cetirizine 10 MG tablet    zyrTEC     Take 10 mg by mouth daily        CIALIS 20 MG tablet   Generic drug:  tadalafil      Take 20 mg by mouth daily as needed        citalopram 40 MG tablet    celeXA    30 tablet    Take 1 tablet (40 mg) by mouth daily    Anxiety       clopidogrel 75 MG tablet    PLAVIX    90 tablet    Take 1 tablet (75 mg) by mouth daily    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 If symptoms persist 5 minutes after 1st dose call 911.    Atherosclerosis of native coronary artery of native heart with unstable angina pectoris (H)       oxyCODONE 5 MG capsule    OXY-IR    50 capsule    Take 1-2 capsules (5-10 mg) by mouth every 8 hours as needed for moderate to severe pain    Closed Colles' fracture of right radius with routine healing, subsequent encounter       rosuvastatin 40 MG  tablet    CRESTOR    90 tablet    Take 1 tablet (40 mg) by mouth daily    Hyperlipidemia LDL goal <130       zolpidem 10 MG tablet    AMBIEN    30 tablet    Take 1 tablet (10 mg) by mouth nightly as needed for sleep    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, insomnia type

## 2017-08-04 ENCOUNTER — MYC REFILL (OUTPATIENT)
Dept: FAMILY MEDICINE | Facility: CLINIC | Age: 67
End: 2017-08-04

## 2017-08-04 DIAGNOSIS — F41.9 ANXIETY: ICD-10-CM

## 2017-08-07 RX ORDER — CITALOPRAM HYDROBROMIDE 40 MG/1
40 TABLET ORAL DAILY
Qty: 30 TABLET | Refills: 6 | Status: SHIPPED | OUTPATIENT
Start: 2017-08-07 | End: 2018-03-02

## 2017-08-07 RX ORDER — ALPRAZOLAM 0.5 MG
0.5 TABLET ORAL 3 TIMES DAILY PRN
Qty: 90 TABLET | Refills: 0 | Status: SHIPPED | OUTPATIENT
Start: 2017-08-07 | End: 2017-09-06

## 2017-08-07 NOTE — TELEPHONE ENCOUNTER
[facility]  Original authorizing provider: MARCY Mitchell CNP would like a refill of the following medications:  ALPRAZolam (XANAX) 0.5 MG tablet [MARCY Mitchell CNP]    Preferred pharmacy: [address]     Comment:      Medication renewals requested in this message routed to other providers:  citalopram (CELEXA) 40 MG tablet [Jose Hernandez MD]

## 2017-08-07 NOTE — TELEPHONE ENCOUNTER
Message from [facility]:  Original authorizing provider: MD Quan Dean would like a refill of the following medications:  citalopram (CELEXA) 40 MG tablet [Jose Hernandez MD]    Preferred pharmacy: [address]     Comment:      Medication renewals requested in this message routed to other providers:  ALPRAZolam (XANAX) 0.5 MG tablet [Priyanka Medellin, APRN CNP]

## 2017-08-09 ENCOUNTER — HOSPITAL ENCOUNTER (OUTPATIENT)
Dept: OCCUPATIONAL THERAPY | Facility: CLINIC | Age: 67
Setting detail: THERAPIES SERIES
End: 2017-08-09
Attending: ORTHOPAEDIC SURGERY
Payer: MEDICARE

## 2017-08-09 PROCEDURE — 97165 OT EVAL LOW COMPLEX 30 MIN: CPT | Mod: GO | Performed by: OCCUPATIONAL THERAPIST

## 2017-08-09 PROCEDURE — G8987 SELF CARE CURRENT STATUS: HCPCS | Mod: GO,CK | Performed by: OCCUPATIONAL THERAPIST

## 2017-08-09 PROCEDURE — G8988 SELF CARE GOAL STATUS: HCPCS | Mod: GO,CI | Performed by: OCCUPATIONAL THERAPIST

## 2017-08-09 PROCEDURE — 97110 THERAPEUTIC EXERCISES: CPT | Mod: GO | Performed by: OCCUPATIONAL THERAPIST

## 2017-08-09 PROCEDURE — 40000839 ZZH STATISTIC HAND THERAPY VISIT: Performed by: OCCUPATIONAL THERAPIST

## 2017-08-09 NOTE — PROGRESS NOTES
[facility]      OUTPATIENT OCCUPATIONAL THERAPY HAND EVALUATION  PLAN OF TREATMENT FOR OUTPATIENT REHABILITATION  (COMPLETE FOR INITIAL CLAIMS ONLY)  Patient's Last Name, First Name, M.I.  YOB: 1950  Quan Murphy                        Provider s Name: [facility] Medical Record No.  [id number]     Onset Date: 07/14/17    Start of Care Date: 08/09/17   Type:     ___PT  _X_OT   ___SLP    Medical Diagnosis: ORIF right distal radius    Occupational Therapy Diagnosis:  Decreased independence, ease and efficiency in ADL's and IADL's     Visits from SOC: 1      _________________________________________________________________________________  Plan of Treatment/Functional Goals:  Planned Therapy Interventions:  Paraffin, Strengthening, ROM, Coordination, Stretching, Manual Therapy, Splinting, Education of splint wear, care, fit and precautions, Edema Management, Scar Management, Desensitization, Self Care/Home Management, Home Program     Goals  1.  Goal Target Task: Squeeze shampoo bottle, Hold washcloth and wash UE, Hold/squeeze washcloth       Goal Target Performance Level: No difficulty       Due date: 11/07/17     2. Goal Target Task: Don/Doff pants, Don/Doff shirt       Goal Target Performance Level: No difficulty       Due Date: 11/07/17     3.. Goal Target Task: Open a tight or new jar, Turn key in lock to open door,  and turn to open a door       Goal Target Performance Level: No difficulty       Due Date: 11/07/17       4. Goal Target Task: Turn car key to start car, Shift into gear,  steering wheel       Goal Target Performance Level: No difficulty       Due Date: 11/07/17    5. Goal Target Task: Staying asleep, Falling asleep       Goal Target Performance Level: No difficulty       Due Date: 11/07/17       Treatment Frequency: Therapy Frequency: 2x/week for 3 weeks,  tapering to 1x/week as indicated   Predicated Duration of Therapy Intervention:  Predicted Duration of Therapy Intervention (days/wks): 10 weeks     Nikki Barnes OT         I CERTIFY THE NEED FOR THESE SERVICES FURNISHED UNDER        THIS PLAN OF TREATMENT AND WHILE UNDER MY CARE     (Physician co-signature of this document indicates review and certification of the therapy plan).                  Certification Period:  08/09/17 to 11/07/17            Referring Physician:  Dr. Pedro Blanca     Initial Assessment        See Epic Evaluation Start of Care Date: 08/09/17

## 2017-08-09 NOTE — PROGRESS NOTES
08/09/17 1400   Quick Adds   Quick Adds Fall Risk Screen;Certification   Therapy Certification   Certification date from 08/09/17   Certification date to 11/07/17   Medical Diagnosis ORIF right distal radius    Certification I certify the need for these services furnished under this plan of treatment and while under my care.  (Physician co-signature of this document indicates review and certification of the therapy plan).   General Information/History   Start Of Care Date 08/09/17   Referring Physician Dr. Pedro Blanca    Orders Evaluate And Treat As Indicated;Other   Other Orders work on gentle ROM, no resistance    Orders Date 08/03/17   Medical Diagnosis ORIF right distal radius    Precautions/Limitations No resistance, splint wear if not for hygiene and ROM exercises    Additional Occupational Profile Info/Pertinent history of current problem Asphalt    Previous treatment or current condition Splinting    How/Where did it occur With a fall;During contact with another person   Onset date of current episode/exacerbation 07/14/17   Date of surgery 07/18/17   Surgical procedure ORIF right distal radius    Chronicity New   Hand Dominance Right   Affected side Right   Functional limitations perform activities of daily living;perform required work activities;perform desired leisure / sports activities   Reported Symptoms Pain;Loss of Motion/Stiffness;Loss of strength;Numbness;Edema   QuickDASH [Functional Disability Questionnaire; 0-100 (0=no dysfunction; 100=dysfunction)] (12 on UEFI )   Prior level of function Independent ADL;Independent IADL   Important Activities Working part time as a school lunch and recess attendant    Living environment [facility]   Patient role/Employment history Retired  (Partially retired )   Occupation Working part time as a school lunch and recess attendant    Employment Status (Summer vacation )   Primary Job Tasks Repetitive tasks;Lifting;Reaching;Carrying   Patient/Family goals  statement To get it back to as normal function as possible    Fall Risk Screen   Fall screen completed by OT   Have you fallen 2 or more times in the last year? No   Have you fallen and had an injury in the past year? Yes   Is the patient a fall risk? No   Comments Situational fall off ladder    Pain   Pain Primary Pain Report   Primary Pain Report   Location Right wrist    Radiation Hand;Dorsal Forearm;Volar Forearm   Pain Quality Aching;Throbbing;Shooting   Frequency Intermittent   Scale 6/10   Pain Is Same All Of The Time   Pain Is Exacerbated By Activity/movement   Pain Is Relieved By Rest;Splints;Immobilization   Progression Since Onset Gradually Improving   Edema   Edema Assessed;Distal Palmar Crease;Distal Wrist Crease   Overall  - Left None   Overall  - Right Moderate   Distal Palmar Crease (measured in cm)   - Left 22.6    - Right 23.1    Distal Wrist Crease (measured in cm)   Distal Wrist Crease - - Left 19.5    Distal Wrist Crease - - Right 20.9    Scar/Wound   Scar/Wound Scar   Scar   Scar location Volar wrist/forearm    Appearance Red   Sensitivity Mild   Quality Adhered;Smooth  (Scabs present )   Tenderness   Tenderness Thenar;Hypothenar;CMC Thumb   Overall - Left None    Overall - Right Moderate    CMC Thumb   Left None;-   Right +;Moderate   Thenar   Left None;-   Right +;Mild   Hypothenar   Left -;None   Right Moderate;+   ROM   ROM AROM   AROM   AROM Wrist;Thumb   Comments Patient can obtain full fist, edema limiting, tight range end feel    Thumb   MP Extension - Left 0   MP Extension - Right 0   MP Flexion - Left 57   MP Flexion - Right 34   IP Extension - Left 0   IP Extension - Right 0   IP Flexion - Left 70   IP Flexion - Right 15   RABD - Left 50   RABD - Right 40   PABD - Left 50   PABD - Right 43   Wrist   Wrist Extension - Left 63   Wrist Extension - Right 15   Wrist Flexion- Left 80   Wrist Flexion - Right 30   Wrist Supination- Left 90   Wrist Supination - Right 67   Wrist Pronation-  Left 90   Wrist Pronation - Right 90   Sensation Findings   Sensation Findings Sensation  (Patient reports intermittent numbness with contact)   Sudomotor   - Left Normal   Sensation   - Left WNL Per Patient   Sudomotor - Right Normal   Sensation - Right Decreased Median Nerve Distribution  (Thumb, index, thenar and hypothenar eminence )   Strength   Strength Other (see comments)   Strength Comments Not tested this date due to restrictions    Education Assessment   Preferred Learning Style Listening;Demonstration;Pictures/video   Barriers to Learning No barriers   Therapy Interventions   Planned Therapy Interventions Paraffin;Strengthening;ROM;Coordination;Stretching;Manual Therapy;Splinting;Education of splint wear, care, fit and precautions;Edema Management;Scar Management;Desensitization;Self Care/Home Management;Home Program   Clinical Impression   Criteria for Skilled Therapeutic Interventions Met yes;treatment indicated   OT Diagnosis Decreased independence, ease and efficiency in ADL's and IADL's    Influenced by the following impairments Pain;Edema;Decreased range of motion;Decreased strength;Impaired sensation   Assessment of Occupational Performance 3-5 Performance Deficits   Identified Performance Deficits dressing, bathing, grooming, household management    Clinical Decision Making (Complexity) Low complexity   Therapy Frequency 2x/week for 3 weeks, tapering to 1x/week as indicated    Predicted Duration of Therapy Intervention (days/wks) 10 weeks    Risks and Benefits of Treatment have been explained. Yes   Patient, Family & other staff in agreement with plan of care Yes   Hand Goals   Hand Goals Household Chores;Dressing;Sleeping;Driving;Bathing   Bathing   Current Functional Task Squeezing;Holding   Previous Performance Level Independent   Current Performance Level Severe difficulty   Goal Target Task Squeeze shampoo bottle;Hold washcloth and wash UE;Hold/squeeze washcloth   Goal Target Performance  Level No difficulty   Due date 11/07/17   Dressing   Current Functional Task Dressing LB;Dressing UB   Previous Performance Level Independent   Current Performance Level Severe difficulty   Goal Target Task Don/Doff pants;Don/Doff shirt   Goal Target Performance Level No difficulty   Due Date 11/07/17   Household Chores   Current Functional Task Gripping;Carrying;Pushing   Previous Performance Level Independent   Current Performance Level Severe difficulty   Goal Target Task Open a tight or new jar;Turn key in lock to open door; and turn to open a door   Goal Target Performance Level No difficulty   Due Date 11/07/17   Driving   Current Functional Task Shifting;Turning car key;Opening car door   Previous Performance Level Independent   Current Performance Level Unable   Goal Target Task Turn car key to start car;Shift into gear; steering wheel   Goal Target Performance Level No difficulty   Due Date 11/07/17   Sleeping   Current Functional Task Falling asleep;Sleeping through the night   Previous Performance Level No difficulty   Current Performance Level Moderate difficulty   Goal Target Task Staying asleep;Falling asleep   Goal Target Performance Level No difficulty   Due Date 11/07/17   Total Evaluation Time   Total Evaluation Time (Minutes) 15        Thank you for referring Quan to Occupational TherapyNikki/SHANTELL

## 2017-08-14 ENCOUNTER — HOSPITAL ENCOUNTER (OUTPATIENT)
Dept: OCCUPATIONAL THERAPY | Facility: CLINIC | Age: 67
Setting detail: THERAPIES SERIES
End: 2017-08-14
Attending: ORTHOPAEDIC SURGERY
Payer: MEDICARE

## 2017-08-14 PROCEDURE — 97110 THERAPEUTIC EXERCISES: CPT | Mod: GO | Performed by: OCCUPATIONAL THERAPIST

## 2017-08-14 PROCEDURE — 40000839 ZZH STATISTIC HAND THERAPY VISIT: Performed by: OCCUPATIONAL THERAPIST

## 2017-08-14 PROCEDURE — 97140 MANUAL THERAPY 1/> REGIONS: CPT | Mod: GO | Performed by: OCCUPATIONAL THERAPIST

## 2017-08-16 ENCOUNTER — HOSPITAL ENCOUNTER (OUTPATIENT)
Dept: OCCUPATIONAL THERAPY | Facility: CLINIC | Age: 67
Setting detail: THERAPIES SERIES
End: 2017-08-16
Attending: ORTHOPAEDIC SURGERY
Payer: MEDICARE

## 2017-08-16 PROCEDURE — 40000839 ZZH STATISTIC HAND THERAPY VISIT: Performed by: OCCUPATIONAL THERAPIST

## 2017-08-16 PROCEDURE — 97140 MANUAL THERAPY 1/> REGIONS: CPT | Mod: GO | Performed by: OCCUPATIONAL THERAPIST

## 2017-08-16 PROCEDURE — 97110 THERAPEUTIC EXERCISES: CPT | Mod: GO | Performed by: OCCUPATIONAL THERAPIST

## 2017-08-18 ENCOUNTER — OFFICE VISIT (OUTPATIENT)
Dept: ORTHOPEDICS | Facility: CLINIC | Age: 67
End: 2017-08-18
Payer: MEDICARE

## 2017-08-18 ENCOUNTER — RADIANT APPOINTMENT (OUTPATIENT)
Dept: GENERAL RADIOLOGY | Facility: CLINIC | Age: 67
End: 2017-08-18
Attending: ORTHOPAEDIC SURGERY
Payer: MEDICARE

## 2017-08-18 VITALS — BODY MASS INDEX: 30.39 KG/M2 | WEIGHT: 200.5 LBS | HEIGHT: 68 IN | TEMPERATURE: 96.9 F

## 2017-08-18 DIAGNOSIS — Z98.890 S/P ORIF (OPEN REDUCTION INTERNAL FIXATION) FRACTURE: ICD-10-CM

## 2017-08-18 DIAGNOSIS — M25.551 HIP PAIN, RIGHT: ICD-10-CM

## 2017-08-18 DIAGNOSIS — S52.531D CLOSED COLLES' FRACTURE OF RIGHT RADIUS WITH ROUTINE HEALING, SUBSEQUENT ENCOUNTER: ICD-10-CM

## 2017-08-18 DIAGNOSIS — M70.61 GREATER TROCHANTERIC BURSITIS, RIGHT: Primary | ICD-10-CM

## 2017-08-18 DIAGNOSIS — Z87.81 S/P ORIF (OPEN REDUCTION INTERNAL FIXATION) FRACTURE: ICD-10-CM

## 2017-08-18 PROCEDURE — 20610 DRAIN/INJ JOINT/BURSA W/O US: CPT | Mod: 79 | Performed by: ORTHOPAEDIC SURGERY

## 2017-08-18 PROCEDURE — 99213 OFFICE O/P EST LOW 20 MIN: CPT | Mod: 24 | Performed by: ORTHOPAEDIC SURGERY

## 2017-08-18 PROCEDURE — 99024 POSTOP FOLLOW-UP VISIT: CPT | Performed by: ORTHOPAEDIC SURGERY

## 2017-08-18 PROCEDURE — 73110 X-RAY EXAM OF WRIST: CPT | Mod: TC

## 2017-08-18 PROCEDURE — 73502 X-RAY EXAM HIP UNI 2-3 VIEWS: CPT | Mod: TC

## 2017-08-18 RX ORDER — TRIAMCINOLONE ACETONIDE 40 MG/ML
40 INJECTION, SUSPENSION INTRA-ARTICULAR; INTRAMUSCULAR ONCE
Qty: 1 ML | Refills: 0 | OUTPATIENT
Start: 2017-08-18 | End: 2017-08-18

## 2017-08-18 ASSESSMENT — PAIN SCALES - GENERAL: PAINLEVEL: MILD PAIN (2)

## 2017-08-18 NOTE — LETTER
8/18/2017         RE: Quan Murphy  [id number] [facility] [id number]        Dear Colleague,    Thank you for referring your patient, Quan Murphy, to the [facility]. Please see a copy of my visit note below.    Orthopedic Clinic Post-Operative Note    CHIEF COMPLAINT:   Chief Complaint   Patient presents with     RECHECK     Right wrist follow up- DOI: 7/14/17     Surgical Followup     DOS:7/18/17~Open reduction, internal fixation right distal radius fracture       HISTORY OF PRESENT ILLNESS  Wrist is doing well. Attending therapy.    Complains of right lateral hip pain. This pain began while prior to his fall. He started earlier this year. Lateral location nonradiating. Worse if he's been walking on the treadmill. Tender to the touch.    Patient's past medical, surgical, social and family histories reviewed.     Past Medical History:   Diagnosis Date     Allergy, unspecified not elsewhere classified     Seasonal allergies, pollen, dust, smoke and animals     Coronary atherosclerosis of unspecified type of vessel, native or graft     Coronary artery disease     Malignant neoplasm of prostate (H)     Prostate cancer       Past Surgical History:   Procedure Laterality Date     ARTHRODESIS FOOT  7/23/2013    Procedure: ARTHRODESIS FOOT;  Great Toe Arthrodesis Left Foot;  Surgeon: Ash Gonzalez DPM;  [facility]     ARTHRODESIS FOOT  6/10/2014    Procedure: ARTHRODESIS FOOT;  Surgeon: Ash Gonzalez DPM;  Location: [facility] OR     C LAPAROSCOPY, SURGICAL PROSTATECTOMY, RETROPUBIC RADICAL, W/NERVE SPARING  11/30/2004    With full bilateral pelvic lymphadenectomy.  F-[facility].     C TOTAL KNEE ARTHROPLASTY  05/01/08    Left knee     COLONOSCOPY  10/7/2013    Procedure: COLONOSCOPY;  Colonoscopy;  Surgeon: Mike Fallon MD;  Location: [facility] GI     HC CORRECT BUNION,SIMPLE  08/11/2005    x3     HC REMV TOE BENIGN BONE LESN  08/11/2005     HERNIORRHAPHY INGUINAL  7/3/2013    Procedure:  HERNIORRHAPHY INGUINAL;  Open Repair Inguinal hernia Right with mesh ;  Surgeon: Sam Escobar MD;  Location: [facility] OR     MOHS MICROGRAPHIC PROCEDURE  08/23/11    ear and chin-[facility]     OPEN REDUCTION INTERNAL FIXATION WRIST Right 7/18/2017    Procedure: OPEN REDUCTION INTERNAL FIXATION WRIST;  Right distal radius open reduction and internal fixation;  Surgeon: Pedro Blanca DO;  [facility]     RECONSTRUCT FOREFOOT WITH METATARSOPHALANGEAL (MTP) FUSION  6/10/2014    Procedure: RECONSTRUCT FOREFOOT WITH METATARSOPHALANGEAL (MTP) FUSION;  Surgeon: Ash Gonzalez DPM;  [facility]     STENT, CORONARY, DEMI       SURGICAL HISTORY OF -   1999/1974    lt knee     SURGICAL HISTORY OF -   10/2004    lithotripsy     SURGICAL HISTORY OF - 11/05    angiogram with stent       Medications:    Current Outpatient Prescriptions on File Prior to Visit:  ALPRAZolam (XANAX) 0.5 MG tablet Take 1 tablet (0.5 mg) by mouth 3 times daily as needed for anxiety   citalopram (CELEXA) 40 MG tablet Take 1 tablet (40 mg) by mouth daily   hydrOXYzine (VISTARIL) 25 MG capsule Take 1 capsule (25 mg) by mouth 3 times daily as needed for itching   oxyCODONE (OXY-IR) 5 MG capsule Take 1-2 capsules (5-10 mg) by mouth every 8 hours as needed for moderate to severe pain   HYDROcodone-acetaminophen (NORCO) 7.5-325 MG per tablet Take 1 tablet by mouth 4 times daily as needed for moderate to severe pain   zolpidem (AMBIEN) 10 MG tablet Take 1 tablet (10 mg) by mouth nightly as needed for sleep   rosuvastatin (CRESTOR) 40 MG tablet Take 1 tablet (40 mg) by mouth daily   clopidogrel (PLAVIX) 75 MG tablet Take 1 tablet (75 mg) by mouth daily   aspirin 81 MG EC tablet Take 1 tablet (81 mg) by mouth daily   nitroglycerin (NITROSTAT) 0.4 MG sublingual tablet For chest pain place 1 tablet under the tongue every 5 minutes for 3 doses. If symptoms persist 5 minutes after 1st dose call 911.   cetirizine (ZYRTEC) 10 MG  "tablet Take 10 mg by mouth daily   CIALIS 20 MG tablet Take 20 mg by mouth daily as needed      No current facility-administered medications on file prior to visit.     Allergies   Allergen Reactions     Animal Dander      Dust Mites      Pollen Extract      Smoke.        Social History     Occupational History     Not on file.     Social History Main Topics     Smoking status: Never Smoker     Smokeless tobacco: Never Used     Alcohol use 5.0 oz/week     10 Cans of beer per week      Comment: occasional      Drug use: No     Sexual activity: Yes     Partners: Female       Family History   Problem Relation Age of Onset     Hypertension Father      has had MI      Connective Tissue Disorder Mother      LUPUS     HEART DISEASE Mother      poor valve-needing replacement       REVIEW OF SYSTEMS  General: negative for, night sweats, dizziness, fatigue  Resp: No shortness of breath and no cough  CV: negative for chest pain, syncope or near-syncope  GI: negative for nausea, vomiting and diarrhea  : negative for dysuria and hematuria  Musculoskeletal: as above  Neurologic: negative for syncope   Hematologic: negative for bleeding disorder    Physical Exam:  Vitals: Temp 96.9  F (36.1  C) (Temporal)  Ht 5' 7.9\" (1.725 m)  Wt 200 lb 8 oz (90.9 kg)  BMI 30.58 kg/m2  BMI= Body mass index is 30.58 kg/(m^2).  Constitutional: healthy, alert and no acute distress   Psychiatric: mentation appears normal and affect normal/bright  NEURO: no focal deficits  SKIN: .well healed, no erythema, no incision breakdown and no drainage.  JOINT/EXTREMITIES: Right wrist: Expected stiffness. Distal neurovascular intact. stiffness with extension however the remainder the motion is doing quite well.  Right hip: Focal pain over the greater trochanteric region which reproduces pain. No atrophy. IT band tightness  GAIT: non-antalgic    Diagnostic Modalities:  right wrist X-ray: Distal radius in acceptable alignment with volar fixation plate. " Unchanged. Fracture still visible.  right hip X-ray: No fractures or dislocations. Some minimal joint space narrowing.  Independent visualization of the images was performed.      Impression:   Chief Complaint   Patient presents with     RECHECK     Right wrist follow up- DOI: 7/14/17     Surgical Followup     DOS:7/18/17~Open reduction, internal fixation right distal radius fracture    doing well from the wrist perspective.    Plan:   Continue working range of motion. No lifting pushing or pulling. Wear brace when not working on motion.    Right hip greater trochanter bursitis: We discussed an injection. Would also recommend physical therapy at some point. Would like to see him recover from his wrist before beginning therapy for his hip    The patient was counseled about an  injection, including discussion of risks (including infection), contents of the injection, rationale for performing the injection, and expected benefits of the injection. The skin was prepped with alcohol and betadine and then utilizing sterile technique an injection of the right hip trochanteric bursa was performed. The injection consisted 1ml of Kenalog (40mg per 1 ml) mixed with 1ml of 0.5% Marcaine. The patient tolerated the injection well, and there were no complications. The injection site was covered with a Band-Aid. The injection was performed by Tereso Carroll, APRN, CNP, DNP      Return to clinic 4-6 , week(s), or sooner as needed for changes.    Re-x-ray on return: No    Amor Blanca D.O.    Again, thank you for allowing me to participate in the care of your patient.        Sincerely,        Pedro Blanca,

## 2017-08-18 NOTE — NURSING NOTE
"Chief Complaint   Patient presents with     RECHECK     Right wrist follow up- DOI: 7/14/17     Surgical Followup     DOS:7/18/17~Open reduction, internal fixation right distal radius fracture       Initial Temp 96.9  F (36.1  C) (Temporal)  Ht 1.725 m (5' 7.9\")  Wt 90.9 kg (200 lb 8 oz)  BMI 30.58 kg/m2 Estimated body mass index is 30.58 kg/(m^2) as calculated from the following:    Height as of this encounter: 1.725 m (5' 7.9\").    Weight as of this encounter: 90.9 kg (200 lb 8 oz).  Medication Reconciliation: adriana Vidal CMA        "

## 2017-08-18 NOTE — MR AVS SNAPSHOT
After Visit Summary   8/18/2017    Quan Murphy    MRN: [id number]           Patient Information     Date Of Birth          1950        Visit Information        Provider Department      8/18/2017 3:20 PM Pedro Blanca DO [facility]        Today's Diagnoses     Greater trochanteric bursitis, right    -  1    Closed Colles' fracture of right radius with routine healing, subsequent encounter           Follow-ups after your visit        Your next 10 appointments already scheduled     Aug 22, 2017  1:30 PM CDT   Hand Treatment with Mirna Whitehead, OT   [facility] ([facility])    [address] [place] [address] [address] [place] [address]   [phone]            Aug 24, 2017  3:30 PM CDT   Hand Treatment with Mirna Whitehead, OT   [facility] ([facility])    [place] [facility] [address] [place] [address]   [phone]            Aug 29, 2017  3:30 PM CDT   Hand Treatment with Mirna Whitehead OT   [facility] ([facility])    [address] [place] [address] [address] [place] [address]   [phone]            Aug 31, 2017  3:30 PM CDT   Hand Treatment with Mirna Whitehead, OT   [facility] ([facility])    [place] [facility] [address] [place] [address]   [phone]            Sep 05, 2017  3:15 PM CDT   Hand Treatment with Mirna Whitehead, OT   [facility] ([facility])    [place] [facility] [address] [place] [address]   [phone]            Sep 07, 2017  2:45 PM CDT   Hand Treatment with Mirna Whitehead, OT   [facility] ([facility])    [place] [facility] [address] [place] [address]   [phone]            Sep 20, 2017  2:00 PM CDT   Return Visit with Pedro Blanca DO   [facility]  [facility]    [address] [facility]  [place] [id number] [phone]              Who to contact     If you have questions or need follow up information about today's clinic visit or your schedule please contact [facility] directly at [phone].  Normal or non-critical lab and imaging results will be communicated to you by MyChart, letter or phone within 4 business days after the clinic has received the results. If you do not hear from us within 7 days, please contact the clinic through MyChart or phone. If you have a critical or abnormal lab result, we will notify you by phone as soon as possible.  Submit refill requests through [employer] or call your pharmacy and they will forward the refill request to us. Please allow 3 business days for your refill to be completed.          Additional Information About Your Visit        MyChart Information     [employer] gives you secure access to your electronic health record. If you see a primary care provider, you can also send messages to your care team and make appointments. If you have questions, please call your primary care clinic.  If you do not have a primary care provider, please call [phone] and they will assist you.        Care EveryWhere ID     This is your Care EveryWhere ID. This could be used by other organizations to access your [place] medical records  [id number]        Your Vitals Were     Temperature Height BMI (Body Mass Index)             96.9  F (36.1  C) (Temporal) 1.725 m (5' 7.9\") 30.58 kg/m2          Blood Pressure from Last 3 Encounters:   07/18/17 122/70   07/17/17 118/80   07/14/17 121/83    Weight from Last 3 Encounters:   08/18/17 90.9 kg (200 lb 8 oz)   08/03/17 92.1 kg (203 lb)   07/27/17 92.3 kg (203 lb 8 oz)               Primary Care Provider Office Phone # Fax #    Jose Hernandez [phone]       [address] [place] [address]        Equal Access to " Services     [facility]: Hadii aad ku hadjjking [place], waaxda luqadaha, qaybta kaalmada jemima, mitchell hooper . So [facility] [phone].    ATENCIÓN: Si habla michaela, tiene a recinos disposición servicios gratuitos de asistencia lingüística. Llame al [phone].    We comply with applicable federal civil rights laws and [place] laws. We do not discriminate on the basis of race, color, national origin, age, disability sex, sexual orientation or gender identity.            Thank you!     Thank you for choosing [facility]  for your care. Our goal is always to provide you with excellent care. Hearing back from our patients is one way we can continue to improve our services. Please take a few minutes to complete the written survey that you may receive in the mail after your visit with us. Thank you!             Your Updated Medication List - Protect others around you: Learn how to safely use, store and throw away your medicines at www.disposemymeds.org.          This list is accurate as of: 8/18/17  4:06 PM.  Always use your most recent med list.                   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)       cetirizine 10 MG tablet    zyrTEC     Take 10 mg by mouth daily        CIALIS 20 MG tablet   Generic drug:  tadalafil      Take 20 mg by mouth daily as needed        citalopram 40 MG tablet    celeXA    30 tablet    Take 1 tablet (40 mg) by mouth daily    Anxiety       clopidogrel 75 MG tablet    PLAVIX    90 tablet    Take 1 tablet (75 mg) by mouth daily    Coronary artery disease involving native artery of transplanted heart with unstable  angina pectoris (H)       HYDROcodone-acetaminophen 7.5-325 MG per tablet    NORCO    100 tablet    Take 1 tablet by mouth 4 times daily as needed for moderate to severe pain    Arthropathy       hydrOXYzine 25 MG capsule    VISTARIL    60 capsule    Take 1 capsule (25 mg) by mouth 3 times daily as needed for itching        nitroGLYcerin 0.4 MG sublingual tablet    NITROSTAT    25 tablet    For chest pain place 1 tablet under the tongue every 5 minutes for 3 doses. If symptoms persist 5 minutes after 1st dose call 911.    Atherosclerosis of native coronary artery of native heart with unstable angina pectoris (H)       oxyCODONE 5 MG capsule    OXY-IR    50 capsule    Take 1-2 capsules (5-10 mg) by mouth every 8 hours as needed for moderate to severe pain    Closed Colles' fracture of right radius with routine healing, subsequent encounter       rosuvastatin 40 MG tablet    CRESTOR    90 tablet    Take 1 tablet (40 mg) by mouth daily    Hyperlipidemia LDL goal <130       zolpidem 10 MG tablet    AMBIEN    30 tablet    Take 1 tablet (10 mg) by mouth nightly as needed for sleep    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, insomnia type

## 2017-08-18 NOTE — PROGRESS NOTES
Orthopedic Clinic Post-Operative Note    CHIEF COMPLAINT:   Chief Complaint   Patient presents with     RECHECK     Right wrist follow up- DOI: 7/14/17     Surgical Followup     DOS:7/18/17~Open reduction, internal fixation right distal radius fracture       HISTORY OF PRESENT ILLNESS  Wrist is doing well. Attending therapy.    Complains of right lateral hip pain. This pain began while prior to his fall. He started earlier this year. Lateral location nonradiating. Worse if he's been walking on the treadmill. Tender to the touch.    Patient's past medical, surgical, social and family histories reviewed.     Past Medical History:   Diagnosis Date     Allergy, unspecified not elsewhere classified     Seasonal allergies, pollen, dust, smoke and animals     Coronary atherosclerosis of unspecified type of vessel, native or graft     Coronary artery disease     Malignant neoplasm of prostate (H)     Prostate cancer       Past Surgical History:   Procedure Laterality Date     ARTHRODESIS FOOT  7/23/2013    Procedure: ARTHRODESIS FOOT;  Great Toe Arthrodesis Left Foot;  Surgeon: Ash Gonzalez DPM;  Location: [facility]     ARTHRODESIS FOOT  6/10/2014    Procedure: ARTHRODESIS FOOT;  Surgeon: Ash Gonzalez DPM;  Location: [facility] OR     C LAPAROSCOPY, SURGICAL PROSTATECTOMY, RETROPUBIC RADICAL, W/NERVE SPARING  11/30/2004    With full bilateral pelvic lymphadenectomy.  F-[facility].     C TOTAL KNEE ARTHROPLASTY  05/01/08    Left knee     COLONOSCOPY  10/7/2013    Procedure: COLONOSCOPY;  Colonoscopy;  Surgeon: Mike Fallon MD;  Location:  GI     HC CORRECT BUNION,SIMPLE  08/11/2005    x3     HC REMV TOE BENIGN BONE LESN  08/11/2005     HERNIORRHAPHY INGUINAL  7/3/2013    Procedure: HERNIORRHAPHY INGUINAL;  Open Repair Inguinal hernia Right with mesh ;  Surgeon: Sam Escobar MD;  Location: [facility] OR     MOHS MICROGRAPHIC PROCEDURE  08/23/11    ear and chin-[facility]     OPEN REDUCTION INTERNAL FIXATION WRIST  Right 7/18/2017    Procedure: OPEN REDUCTION INTERNAL FIXATION WRIST;  Right distal radius open reduction and internal fixation;  Surgeon: Pedro Blanca DO;  Location: [facility]     RECONSTRUCT FOREFOOT WITH METATARSOPHALANGEAL (MTP) FUSION  6/10/2014    Procedure: RECONSTRUCT FOREFOOT WITH METATARSOPHALANGEAL (MTP) FUSION;  Surgeon: Ash Gonzalez DPM;  Location: [facility] OR     STENT, CORONARY, DEMI       SURGICAL HISTORY OF -   1999/1974    lt knee     SURGICAL HISTORY OF -   10/2004    lithotripsy     SURGICAL HISTORY OF - 11/05    angiogram with stent       Medications:    Current Outpatient Prescriptions on File Prior to Visit:  ALPRAZolam (XANAX) 0.5 MG tablet Take 1 tablet (0.5 mg) by mouth 3 times daily as needed for anxiety   citalopram (CELEXA) 40 MG tablet Take 1 tablet (40 mg) by mouth daily   hydrOXYzine (VISTARIL) 25 MG capsule Take 1 capsule (25 mg) by mouth 3 times daily as needed for itching   oxyCODONE (OXY-IR) 5 MG capsule Take 1-2 capsules (5-10 mg) by mouth every 8 hours as needed for moderate to severe pain   HYDROcodone-acetaminophen (NORCO) 7.5-325 MG per tablet Take 1 tablet by mouth 4 times daily as needed for moderate to severe pain   zolpidem (AMBIEN) 10 MG tablet Take 1 tablet (10 mg) by mouth nightly as needed for sleep   rosuvastatin (CRESTOR) 40 MG tablet Take 1 tablet (40 mg) by mouth daily   clopidogrel (PLAVIX) 75 MG tablet Take 1 tablet (75 mg) by mouth daily   aspirin 81 MG EC tablet Take 1 tablet (81 mg) by mouth daily   nitroglycerin (NITROSTAT) 0.4 MG sublingual tablet For chest pain place 1 tablet under the tongue every 5 minutes for 3 doses. If symptoms persist 5 minutes after 1st dose call 911.   cetirizine (ZYRTEC) 10 MG tablet Take 10 mg by mouth daily   CIALIS 20 MG tablet Take 20 mg by mouth daily as needed      No current facility-administered medications on file prior to visit.     Allergies   Allergen Reactions     Animal Dander      Dust Mites      Pollen  "Extract      Smoke.        Social History     Occupational History     Not on file.     Social History Main Topics     Smoking status: Never Smoker     Smokeless tobacco: Never Used     Alcohol use 5.0 oz/week     10 Cans of beer per week      Comment: occasional      Drug use: No     Sexual activity: Yes     Partners: Female       Family History   Problem Relation Age of Onset     Hypertension Father      has had MI      Connective Tissue Disorder Mother      LUPUS     HEART DISEASE Mother      poor valve-needing replacement       REVIEW OF SYSTEMS  General: negative for, night sweats, dizziness, fatigue  Resp: No shortness of breath and no cough  CV: negative for chest pain, syncope or near-syncope  GI: negative for nausea, vomiting and diarrhea  : negative for dysuria and hematuria  Musculoskeletal: as above  Neurologic: negative for syncope   Hematologic: negative for bleeding disorder    Physical Exam:  Vitals: Temp 96.9  F (36.1  C) (Temporal)  Ht 5' 7.9\" (1.725 m)  Wt 200 lb 8 oz (90.9 kg)  BMI 30.58 kg/m2  BMI= Body mass index is 30.58 kg/(m^2).  Constitutional: healthy, alert and no acute distress   Psychiatric: mentation appears normal and affect normal/bright  NEURO: no focal deficits  SKIN: .well healed, no erythema, no incision breakdown and no drainage.  JOINT/EXTREMITIES: Right wrist: Expected stiffness. Distal neurovascular intact. stiffness with extension however the remainder the motion is doing quite well.  Right hip: Focal pain over the greater trochanteric region which reproduces pain. No atrophy. IT band tightness  GAIT: non-antalgic    Diagnostic Modalities:  right wrist X-ray: Distal radius in acceptable alignment with volar fixation plate. Unchanged. Fracture still visible.  right hip X-ray: No fractures or dislocations. Some minimal joint space narrowing.  Independent visualization of the images was performed.      Impression:   Chief Complaint   Patient presents with     RECHECK     " Right wrist follow up- DOI: 7/14/17     Surgical Followup     DOS:7/18/17~Open reduction, internal fixation right distal radius fracture    doing well from the wrist perspective.    Plan:   Continue working range of motion. No lifting pushing or pulling. Wear brace when not working on motion.    Right hip greater trochanter bursitis: We discussed an injection. Would also recommend physical therapy at some point. Would like to see him recover from his wrist before beginning therapy for his hip    The patient was counseled about an  injection, including discussion of risks (including infection), contents of the injection, rationale for performing the injection, and expected benefits of the injection. The skin was prepped with alcohol and betadine and then utilizing sterile technique an injection of the right hip trochanteric bursa was performed. The injection consisted 1ml of Kenalog (40mg per 1 ml) mixed with 1ml of 0.5% Marcaine. The patient tolerated the injection well, and there were no complications. The injection site was covered with a Band-Aid. The injection was performed by Tereso Carroll, APRN, CNP, DNP      Return to clinic 4-6 , week(s), or sooner as needed for changes.    Re-x-ray on return: No    Amor Blanca D.O.

## 2017-08-22 ENCOUNTER — HOSPITAL ENCOUNTER (OUTPATIENT)
Dept: OCCUPATIONAL THERAPY | Facility: CLINIC | Age: 67
Setting detail: THERAPIES SERIES
End: 2017-08-22
Attending: ORTHOPAEDIC SURGERY
Payer: MEDICARE

## 2017-08-22 PROCEDURE — 97010 HOT OR COLD PACKS THERAPY: CPT | Mod: GO | Performed by: OCCUPATIONAL THERAPIST

## 2017-08-22 PROCEDURE — 97140 MANUAL THERAPY 1/> REGIONS: CPT | Mod: GO | Performed by: OCCUPATIONAL THERAPIST

## 2017-08-22 PROCEDURE — 97110 THERAPEUTIC EXERCISES: CPT | Mod: GO | Performed by: OCCUPATIONAL THERAPIST

## 2017-08-22 PROCEDURE — 40000839 ZZH STATISTIC HAND THERAPY VISIT: Performed by: OCCUPATIONAL THERAPIST

## 2017-08-22 NOTE — ADDENDUM NOTE
Encounter addended by: Nikki Barnes OT on: 8/21/2017  9:51 PM<BR>     Actions taken: Flowsheet data copied forward, Flowsheet accepted

## 2017-08-24 ENCOUNTER — HOSPITAL ENCOUNTER (OUTPATIENT)
Dept: OCCUPATIONAL THERAPY | Facility: CLINIC | Age: 67
Setting detail: THERAPIES SERIES
End: 2017-08-24
Attending: ORTHOPAEDIC SURGERY
Payer: MEDICARE

## 2017-08-24 PROCEDURE — 97010 HOT OR COLD PACKS THERAPY: CPT | Mod: GO | Performed by: OCCUPATIONAL THERAPIST

## 2017-08-24 PROCEDURE — 40000839 ZZH STATISTIC HAND THERAPY VISIT: Performed by: OCCUPATIONAL THERAPIST

## 2017-08-24 PROCEDURE — 97140 MANUAL THERAPY 1/> REGIONS: CPT | Mod: GO | Performed by: OCCUPATIONAL THERAPIST

## 2017-08-24 PROCEDURE — 97110 THERAPEUTIC EXERCISES: CPT | Mod: GO | Performed by: OCCUPATIONAL THERAPIST

## 2017-08-28 ENCOUNTER — MYC REFILL (OUTPATIENT)
Dept: FAMILY MEDICINE | Facility: CLINIC | Age: 67
End: 2017-08-28

## 2017-08-28 ENCOUNTER — MYC MEDICAL ADVICE (OUTPATIENT)
Dept: ORTHOPEDICS | Facility: CLINIC | Age: 67
End: 2017-08-28

## 2017-08-28 DIAGNOSIS — M12.9 ARTHROPATHY: ICD-10-CM

## 2017-08-28 DIAGNOSIS — Z96.652 STATUS POST TOTAL LEFT KNEE REPLACEMENT: Primary | ICD-10-CM

## 2017-08-28 RX ORDER — HYDROCODONE BITARTRATE AND ACETAMINOPHEN 7.5; 325 MG/1; MG/1
1 TABLET ORAL 4 TIMES DAILY PRN
Qty: 100 TABLET | Refills: 0 | Status: SHIPPED | OUTPATIENT
Start: 2017-08-28 | End: 2017-09-21

## 2017-08-28 NOTE — TELEPHONE ENCOUNTER
Message from MyChart:  Original authorizing provider: MD Quan Dean would like a refill of the following medications:  HYDROcodone-acetaminophen (NORCO) 7.5-325 MG per tablet [Jose Hernandez MD]    Preferred pharmacy: [address]     Comment:

## 2017-08-31 ENCOUNTER — HOSPITAL ENCOUNTER (OUTPATIENT)
Dept: OCCUPATIONAL THERAPY | Facility: CLINIC | Age: 67
Setting detail: THERAPIES SERIES
End: 2017-08-31
Attending: ORTHOPAEDIC SURGERY
Payer: MEDICARE

## 2017-08-31 PROCEDURE — 40000839 ZZH STATISTIC HAND THERAPY VISIT: Performed by: OCCUPATIONAL THERAPIST

## 2017-08-31 PROCEDURE — 97110 THERAPEUTIC EXERCISES: CPT | Mod: GO | Performed by: OCCUPATIONAL THERAPIST

## 2017-08-31 PROCEDURE — 97010 HOT OR COLD PACKS THERAPY: CPT | Mod: GO | Performed by: OCCUPATIONAL THERAPIST

## 2017-08-31 PROCEDURE — 97140 MANUAL THERAPY 1/> REGIONS: CPT | Mod: GO | Performed by: OCCUPATIONAL THERAPIST

## 2017-09-06 ENCOUNTER — MYC REFILL (OUTPATIENT)
Dept: FAMILY MEDICINE | Facility: CLINIC | Age: 67
End: 2017-09-06

## 2017-09-06 DIAGNOSIS — F41.9 ANXIETY: ICD-10-CM

## 2017-09-06 NOTE — TELEPHONE ENCOUNTER
Message from newMentorhart:  Original authorizing provider: MD Quan Dean ALFREDAFortino Murphy would like a refill of the following medications:  ALPRAZolam (XANAX) 0.5 MG tablet [Jose Hernandez MD]    Preferred pharmacy: [address]     Comment:  Could you please include one refill, as before. It got messed up when I needed a refill and you were on vacation. Thanks, New PARMAR\

## 2017-09-07 ENCOUNTER — HOSPITAL ENCOUNTER (OUTPATIENT)
Dept: OCCUPATIONAL THERAPY | Facility: CLINIC | Age: 67
Setting detail: THERAPIES SERIES
End: 2017-09-07
Attending: ORTHOPAEDIC SURGERY
Payer: MEDICARE

## 2017-09-07 PROCEDURE — 97018 PARAFFIN BATH THERAPY: CPT | Mod: GO | Performed by: OCCUPATIONAL THERAPIST

## 2017-09-07 PROCEDURE — 40000839 ZZH STATISTIC HAND THERAPY VISIT: Performed by: OCCUPATIONAL THERAPIST

## 2017-09-07 PROCEDURE — 97140 MANUAL THERAPY 1/> REGIONS: CPT | Mod: GO | Performed by: OCCUPATIONAL THERAPIST

## 2017-09-07 PROCEDURE — 97110 THERAPEUTIC EXERCISES: CPT | Mod: GO | Performed by: OCCUPATIONAL THERAPIST

## 2017-09-08 RX ORDER — ALPRAZOLAM 0.5 MG
0.5 TABLET ORAL 3 TIMES DAILY PRN
Qty: 90 TABLET | Refills: 1 | Status: SHIPPED | OUTPATIENT
Start: 2017-09-08 | End: 2017-11-03

## 2017-09-08 NOTE — TELEPHONE ENCOUNTER
Xanax      Last Written Prescription Date:  8/7/2017  Last Fill Quantity: 90,   # refills: 0  Last Office Visit with [facility], [facility] or [facility] prescribing provider: 3/3/2017  Future Office visit:    Next 5 appointments (look out 90 days)     Sep 20, 2017  2:00 PM CDT   Return Visit with Pedro Blanca DO   [facility] ([address] [address]   [phone]            Oct 09, 2017  1:30 PM CDT   Return Visit with Aramis Ch MD   [facility] ([address] [address]   [phone]                   Routing refill request to provider for review/approval because:  Drug not on the [facility], [facility] or [facility] refill protocol or controlled substance

## 2017-09-14 ENCOUNTER — HOSPITAL ENCOUNTER (OUTPATIENT)
Dept: OCCUPATIONAL THERAPY | Facility: CLINIC | Age: 67
Setting detail: THERAPIES SERIES
End: 2017-09-14
Attending: ORTHOPAEDIC SURGERY
Payer: MEDICARE

## 2017-09-14 PROCEDURE — 97110 THERAPEUTIC EXERCISES: CPT | Mod: GO | Performed by: OCCUPATIONAL THERAPIST

## 2017-09-14 PROCEDURE — 97140 MANUAL THERAPY 1/> REGIONS: CPT | Mod: GO | Performed by: OCCUPATIONAL THERAPIST

## 2017-09-14 PROCEDURE — 40000839 ZZH STATISTIC HAND THERAPY VISIT: Performed by: OCCUPATIONAL THERAPIST

## 2017-09-18 ENCOUNTER — TELEPHONE (OUTPATIENT)
Dept: ORTHOPEDICS | Facility: CLINIC | Age: 67
End: 2017-09-18

## 2017-09-20 ENCOUNTER — RADIANT APPOINTMENT (OUTPATIENT)
Dept: GENERAL RADIOLOGY | Facility: CLINIC | Age: 67
End: 2017-09-20
Attending: ORTHOPAEDIC SURGERY
Payer: MEDICARE

## 2017-09-20 ENCOUNTER — OFFICE VISIT (OUTPATIENT)
Dept: ORTHOPEDICS | Facility: CLINIC | Age: 67
End: 2017-09-20
Payer: MEDICARE

## 2017-09-20 VITALS — BODY MASS INDEX: 29.81 KG/M2 | WEIGHT: 196.7 LBS | HEIGHT: 68 IN | TEMPERATURE: 97.5 F

## 2017-09-20 DIAGNOSIS — Z96.652 STATUS POST TOTAL LEFT KNEE REPLACEMENT: ICD-10-CM

## 2017-09-20 DIAGNOSIS — Z96.652 STATUS POST TOTAL LEFT KNEE REPLACEMENT: Primary | ICD-10-CM

## 2017-09-20 DIAGNOSIS — S52.531D CLOSED COLLES' FRACTURE OF RIGHT RADIUS WITH ROUTINE HEALING, SUBSEQUENT ENCOUNTER: ICD-10-CM

## 2017-09-20 PROCEDURE — 99024 POSTOP FOLLOW-UP VISIT: CPT | Performed by: ORTHOPAEDIC SURGERY

## 2017-09-20 PROCEDURE — 73562 X-RAY EXAM OF KNEE 3: CPT | Mod: TC

## 2017-09-20 PROCEDURE — 99212 OFFICE O/P EST SF 10 MIN: CPT | Mod: 24 | Performed by: ORTHOPAEDIC SURGERY

## 2017-09-20 NOTE — LETTER
"    9/20/2017         RE: Quan Murphy  [id number] [facility] [address]        Dear Colleague,    Thank you for referring your patient, Quan Murphy, to the [facility]. Please see a copy of my visit note below.    Office Visit-Follow up    Chief Complaint: Quan Murphy is a 67 year old male who is being seen for   Chief Complaint   Patient presents with     RECHECK     Right wrist follow up- DOI: 7/14/17     Surgical Followup     DOS:7/18/17~Open reduction, internal fixation right distal radius fracture~9 weeks postop     Knee Pain     left knee pain       History of Present Illness:   Follow-up for 2 reasons one is postop check right wrist. He reports no pain. Things are going well. Happy with his progress.    Also follows up for left knee. He reports it was replaced 15 years ago. Prior to that surgery he underwent a lateral meniscectomy many years prior. He had no issues healing. His functioning very well. He has no pain or instability.      REVIEW OF SYSTEMS  General: negative for, night sweats, dizziness, fatigue  Resp: No shortness of breath and no cough  CV: negative for chest pain, syncope or near-syncope  GI: negative for nausea, vomiting and diarrhea  : negative for dysuria and hematuria  Musculoskeletal: as above  Neurologic: negative for syncope   Hematologic: negative for bleeding disorder    Physical Exam:  Vitals: Temp 97.5  F (36.4  C) (Temporal)  Ht 5' 7.9\" (1.725 m)  Wt 196 lb 11.2 oz (89.2 kg)  BMI 30 kg/m2  BMI= Body mass index is 30 kg/(m^2).  Constitutional: healthy, alert and no acute distress   Psychiatric: mentation appears normal and affect normal/bright  NEURO: no focal deficits  RESP: Normal with easy respirations and no use of accessory muscles to breathe, no audible wheezing or retractions  CV: LLE:  no edema         Regular rate and rhythm by palpation  SKIN: No erythema, rashes, excoriation, or breakdown. No evidence of infection. , Previous well " healed incisions/laceration: Midline total knee arthroplasty incision as well as a oblique lateral anterior based incision-no evidence of infection  JOINT/EXTREMITIES:left knee: 0-120  active motion. Patella tracks midline. There is no instability. Good muscle tone. No focal areas of tenderness.  Right wrist: Some strict restrictions to terminal flexion and extension however no pain. Some soft tissue swelling along the distal radius. Distal neurovascular intact.  GAIT: non-antalgic            Diagnostic Modalities:  left knee X-ray: Well-seated total knee arthroplasty in place. No evidence of loosening. Components are cemented. Alignment normal.  Independent visualization of the images was performed.      Impression: left total knee arthroplasty-15 years out doing well  Right distal radius fracture 9 weeks out doing well    Plan:  All of the above pertinent physical exam and imaging modalities findings was reviewed with Carloz    As far as his knee and recommended routine evaluation every few years.    Doing well from the wrist perspective I recommend he return back to activities as he tolerates. At things slowly. I will expect some soreness and achiness along the way.        Return to clinic PRN, or sooner as needed for changes.  Re-x-ray on return: No    Amor Blanca D.O.          Again, thank you for allowing me to participate in the care of your patient.        Sincerely,        Pedro Blanca, DO

## 2017-09-20 NOTE — PROGRESS NOTES
"Office Visit-Follow up    Chief Complaint: Quan Murphy is a 67 year old male who is being seen for   Chief Complaint   Patient presents with     RECHECK     Right wrist follow up- DOI: 7/14/17     Surgical Followup     DOS:7/18/17~Open reduction, internal fixation right distal radius fracture~9 weeks postop     Knee Pain     left knee pain       History of Present Illness:   Follow-up for 2 reasons one is postop check right wrist. He reports no pain. Things are going well. Happy with his progress.    Also follows up for left knee. He reports it was replaced 15 years ago. Prior to that surgery he underwent a lateral meniscectomy many years prior. He had no issues healing. His functioning very well. He has no pain or instability.      REVIEW OF SYSTEMS  General: negative for, night sweats, dizziness, fatigue  Resp: No shortness of breath and no cough  CV: negative for chest pain, syncope or near-syncope  GI: negative for nausea, vomiting and diarrhea  : negative for dysuria and hematuria  Musculoskeletal: as above  Neurologic: negative for syncope   Hematologic: negative for bleeding disorder    Physical Exam:  Vitals: Temp 97.5  F (36.4  C) (Temporal)  Ht 5' 7.9\" (1.725 m)  Wt 196 lb 11.2 oz (89.2 kg)  BMI 30 kg/m2  BMI= Body mass index is 30 kg/(m^2).  Constitutional: healthy, alert and no acute distress   Psychiatric: mentation appears normal and affect normal/bright  NEURO: no focal deficits  RESP: Normal with easy respirations and no use of accessory muscles to breathe, no audible wheezing or retractions  CV: LLE:  no edema         Regular rate and rhythm by palpation  SKIN: No erythema, rashes, excoriation, or breakdown. No evidence of infection. , Previous well healed incisions/laceration: Midline total knee arthroplasty incision as well as a oblique lateral anterior based incision-no evidence of infection  JOINT/EXTREMITIES:left knee: 0-120  active motion. Patella tracks midline. There is no " instability. Good muscle tone. No focal areas of tenderness.  Right wrist: Some strict restrictions to terminal flexion and extension however no pain. Some soft tissue swelling along the distal radius. Distal neurovascular intact.  GAIT: non-antalgic            Diagnostic Modalities:  left knee X-ray: Well-seated total knee arthroplasty in place. No evidence of loosening. Components are cemented. Alignment normal.  Independent visualization of the images was performed.      Impression: left total knee arthroplasty-15 years out doing well  Right distal radius fracture 9 weeks out doing well    Plan:  All of the above pertinent physical exam and imaging modalities findings was reviewed with Carloz    As far as his knee and recommended routine evaluation every few years.    Doing well from the wrist perspective I recommend he return back to activities as he tolerates. At things slowly. I will expect some soreness and achiness along the way.        Return to clinic PRN, or sooner as needed for changes.  Re-x-ray on return: No    Amor Blanca D.O.

## 2017-09-20 NOTE — NURSING NOTE
"Chief Complaint   Patient presents with     RECHECK     Right wrist follow up- DOI: 7/14/17     Surgical Followup     DOS:7/18/17~Open reduction, internal fixation right distal radius fracture~9 weeks postop     Knee Pain     left knee pain       Initial Temp 97.5  F (36.4  C) (Temporal)  Ht 1.725 m (5' 7.9\")  Wt 89.2 kg (196 lb 11.2 oz)  BMI 30 kg/m2 Estimated body mass index is 30 kg/(m^2) as calculated from the following:    Height as of this encounter: 1.725 m (5' 7.9\").    Weight as of this encounter: 89.2 kg (196 lb 11.2 oz).  Medication Reconciliation: complete      "

## 2017-10-07 DIAGNOSIS — I10 HYPERTENSION GOAL BP (BLOOD PRESSURE) < 140/90: ICD-10-CM

## 2017-10-07 DIAGNOSIS — E78.5 HYPERLIPIDEMIA LDL GOAL <130: ICD-10-CM

## 2017-10-07 LAB
ALT SERPL W P-5'-P-CCNC: 32 U/L (ref 0–70)
ANION GAP SERPL CALCULATED.3IONS-SCNC: 10 MMOL/L (ref 3–14)
BUN SERPL-MCNC: 13 MG/DL (ref 7–30)
CALCIUM SERPL-MCNC: 9.1 MG/DL (ref 8.5–10.1)
CHLORIDE SERPL-SCNC: 108 MMOL/L (ref 94–109)
CHOLEST SERPL-MCNC: 177 MG/DL
CO2 SERPL-SCNC: 26 MMOL/L (ref 20–32)
CREAT SERPL-MCNC: 1 MG/DL (ref 0.66–1.25)
GFR SERPL CREATININE-BSD FRML MDRD: 75 ML/MIN/1.7M2
GLUCOSE SERPL-MCNC: 97 MG/DL (ref 70–99)
HDLC SERPL-MCNC: 87 MG/DL
LDLC SERPL CALC-MCNC: 76 MG/DL
NONHDLC SERPL-MCNC: 90 MG/DL
POTASSIUM SERPL-SCNC: 4.5 MMOL/L (ref 3.4–5.3)
SODIUM SERPL-SCNC: 144 MMOL/L (ref 133–144)
TRIGL SERPL-MCNC: 68 MG/DL

## 2017-10-07 PROCEDURE — 84460 ALANINE AMINO (ALT) (SGPT): CPT | Performed by: INTERNAL MEDICINE

## 2017-10-07 PROCEDURE — 36415 COLL VENOUS BLD VENIPUNCTURE: CPT | Performed by: INTERNAL MEDICINE

## 2017-10-07 PROCEDURE — 80061 LIPID PANEL: CPT | Performed by: INTERNAL MEDICINE

## 2017-10-07 PROCEDURE — 80048 BASIC METABOLIC PNL TOTAL CA: CPT | Performed by: INTERNAL MEDICINE

## 2017-10-08 ENCOUNTER — MYC MEDICAL ADVICE (OUTPATIENT)
Dept: ORTHOPEDICS | Facility: CLINIC | Age: 67
End: 2017-10-08

## 2017-10-09 ENCOUNTER — OFFICE VISIT (OUTPATIENT)
Dept: CARDIOLOGY | Facility: CLINIC | Age: 67
End: 2017-10-09
Payer: MEDICARE

## 2017-10-09 ENCOUNTER — ALLIED HEALTH/NURSE VISIT (OUTPATIENT)
Dept: FAMILY MEDICINE | Facility: CLINIC | Age: 67
End: 2017-10-09
Payer: MEDICARE

## 2017-10-09 VITALS
BODY MASS INDEX: 30.95 KG/M2 | DIASTOLIC BLOOD PRESSURE: 88 MMHG | WEIGHT: 204.2 LBS | HEART RATE: 66 BPM | HEIGHT: 68 IN | OXYGEN SATURATION: 95 % | SYSTOLIC BLOOD PRESSURE: 140 MMHG

## 2017-10-09 DIAGNOSIS — N20.0 CALCULUS OF KIDNEY: Primary | ICD-10-CM

## 2017-10-09 DIAGNOSIS — I10 HYPERTENSION GOAL BP (BLOOD PRESSURE) < 140/90: ICD-10-CM

## 2017-10-09 DIAGNOSIS — Z23 NEED FOR PROPHYLACTIC VACCINATION AND INOCULATION AGAINST INFLUENZA: ICD-10-CM

## 2017-10-09 DIAGNOSIS — Z23 NEED FOR PNEUMOCOCCAL VACCINATION: Primary | ICD-10-CM

## 2017-10-09 DIAGNOSIS — I25.750 CORONARY ARTERY DISEASE INVOLVING NATIVE ARTERY OF TRANSPLANTED HEART WITH UNSTABLE ANGINA PECTORIS (H): Primary | ICD-10-CM

## 2017-10-09 DIAGNOSIS — E78.5 HYPERLIPIDEMIA LDL GOAL <130: ICD-10-CM

## 2017-10-09 PROCEDURE — 99214 OFFICE O/P EST MOD 30 MIN: CPT | Performed by: INTERNAL MEDICINE

## 2017-10-09 PROCEDURE — G0008 ADMIN INFLUENZA VIRUS VAC: HCPCS | Mod: 59

## 2017-10-09 PROCEDURE — 90670 PCV13 VACCINE IM: CPT

## 2017-10-09 PROCEDURE — 90471 IMMUNIZATION ADMIN: CPT

## 2017-10-09 PROCEDURE — G0009 ADMIN PNEUMOCOCCAL VACCINE: HCPCS | Mod: 59

## 2017-10-09 PROCEDURE — 90662 IIV NO PRSV INCREASED AG IM: CPT

## 2017-10-09 PROCEDURE — 90472 IMMUNIZATION ADMIN EACH ADD: CPT

## 2017-10-09 RX ORDER — LISINOPRIL 5 MG/1
5 TABLET ORAL DAILY
Qty: 90 TABLET | Refills: 3 | Status: SHIPPED | OUTPATIENT
Start: 2017-10-09 | End: 2018-10-01

## 2017-10-09 NOTE — PROGRESS NOTES
Injectable Influenza Immunization Documentation    1.  Is the person to be vaccinated sick today?   No    2. Does the person to be vaccinated have an allergy to a component   of the vaccine?   No    3. Has the person to be vaccinated ever had a serious reaction   to influenza vaccine in the past?   No    4. Has the person to be vaccinated ever had Guillain-Barré syndrome?   No    Form completed by Jocelyn Moody, CMA    Screening Questionnaire for Adult Immunization    Are you sick today?   No   Do you have allergies to medications, food, a vaccine component or latex?   No   Have you ever had a serious reaction after receiving a vaccination?   No   Do you have a long-term health problem with heart disease, lung disease, asthma, kidney disease, metabolic disease (e.g. diabetes), anemia, or other blood disorder?   No   Do you have cancer, leukemia, HIV/AIDS, or any other immune system problem?   No   In the past 3 months, have you taken medications that affect  your immune system, such as prednisone, other steroids, or anticancer drugs; drugs for the treatment of rheumatoid arthritis, Crohn s disease, or psoriasis; or have you had radiation treatments?   No   Have you had a seizure, or a brain or other nervous system problem?   No   During the past year, have you received a transfusion of blood or blood     products, or been given immune (gamma) globulin or antiviral drug?   No   For women: Are you pregnant or is there a chance you could become        pregnant during the next month?   No   Have you received any vaccinations in the past 4 weeks?   No     Immunization questionnaire answers were all negative.        Patient instructed to remain in clinic for 15 minutes afterwards, and to report any adverse reaction to me immediately.       Screening performed by Jocelyn Moody on 10/9/2017 at 2:40 PM.

## 2017-10-09 NOTE — LETTER
10/9/2017      RE: Quan Murphy  [address] [facility] [address]       Dear Colleague,    Thank you for the opportunity to participate in the care of your patient, Quan Murphy, at the [facility]. Please see a copy of my visit note below.    HPI and Plan:     Orders Placed This Encounter   Procedures     Basic metabolic panel     Lipid Profile     ALT     Follow-Up with Cardiologist       Orders Placed This Encounter   Medications     lisinopril (PRINIVIL/ZESTRIL) 5 MG tablet     Sig: Take 1 tablet (5 mg) by mouth daily     Dispense:  90 tablet     Refill:  3       There are no discontinued medications.      Encounter Diagnoses   Name Primary?     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) Yes     Hypertension goal BP (blood pressure) < 140/90      Hyperlipidemia LDL goal <130        CURRENT MEDICATIONS:  Current Outpatient Prescriptions   Medication Sig Dispense Refill     lisinopril (PRINIVIL/ZESTRIL) 5 MG tablet Take 1 tablet (5 mg) by mouth daily 90 tablet 3     HYDROcodone-acetaminophen (NORCO) 7.5-325 MG per tablet Take 1 tablet by mouth 4 times daily as needed for moderate to severe pain 100 tablet 0     ALPRAZolam (XANAX) 0.5 MG tablet Take 1 tablet (0.5 mg) by mouth 3 times daily as needed for anxiety 90 tablet 1     citalopram (CELEXA) 40 MG tablet Take 1 tablet (40 mg) by mouth daily 30 tablet 6     hydrOXYzine (VISTARIL) 25 MG capsule Take 1 capsule (25 mg) by mouth 3 times daily as needed for itching 60 capsule 0     zolpidem (AMBIEN) 10 MG tablet Take 1 tablet (10 mg) by mouth nightly as needed for sleep 30 tablet 5     rosuvastatin (CRESTOR) 40 MG tablet Take 1 tablet (40 mg) by mouth daily 90 tablet 3     clopidogrel (PLAVIX) 75 MG tablet Take 1 tablet (75 mg) by mouth daily 90 tablet 3     cetirizine (ZYRTEC) 10 MG tablet Take 10 mg by mouth daily       CIALIS  20 MG tablet Take 20 mg by mouth daily as needed        oxyCODONE (OXY-IR) 5 MG capsule Take 1-2 capsules (5-10 mg) by mouth every 8 hours as needed for moderate to severe pain (Patient not taking: Reported on 10/9/2017) 50 capsule 0     aspirin 81 MG EC tablet Take 1 tablet (81 mg) by mouth daily 90 tablet 3     nitroglycerin (NITROSTAT) 0.4 MG sublingual tablet For chest pain place 1 tablet under the tongue every 5 minutes for 3 doses. If symptoms persist 5 minutes after 1st dose call 911. 25 tablet 0       ALLERGIES     Allergies   Allergen Reactions     Animal Dander      Dust Mites      Pollen Extract      Smoke.        PAST MEDICAL HISTORY:  Past Medical History:   Diagnosis Date     Allergy, unspecified not elsewhere classified     Seasonal allergies, pollen, dust, smoke and animals     Coronary atherosclerosis of unspecified type of vessel, native or graft     Coronary artery disease     Malignant neoplasm of prostate (H)     Prostate cancer       PAST SURGICAL HISTORY:  Past Surgical History:   Procedure Laterality Date     ARTHRODESIS FOOT  7/23/2013    Procedure: ARTHRODESIS FOOT;  Great Toe Arthrodesis Left Foot;  Surgeon: Ash Gonzalez DPM;  Location: [facility] OR     ARTHRODESIS FOOT  6/10/2014    Procedure: ARTHRODESIS FOOT;  Surgeon: Ash Gonzalez DPM;  Location: [facility] OR     C LAPAROSCOPY, SURGICAL PROSTATECTOMY, RETROPUBIC RADICAL, W/NERVE SPARING  11/30/2004    With full bilateral pelvic lymphadenectomy.  [facility].     C TOTAL KNEE ARTHROPLASTY  05/01/08    Left knee     COLONOSCOPY  10/7/2013    Procedure: COLONOSCOPY;  Colonoscopy;  Surgeon: Mike aFllon MD;  Location: [facility] GI     HC CORRECT BUNION,SIMPLE  08/11/2005    x3     HC REMV TOE BENIGN BONE LESN  08/11/2005     HERNIORRHAPHY INGUINAL  7/3/2013    Procedure: HERNIORRHAPHY INGUINAL;  Open Repair Inguinal hernia Right with mesh ;  Surgeon: Sam Escobar MD;  Location: [facility] OR     MOHS MICROGRAPHIC PROCEDURE  08/23/11    ear and  [facility]     OPEN REDUCTION INTERNAL FIXATION WRIST Right 7/18/2017    Procedure: OPEN REDUCTION INTERNAL FIXATION WRIST;  Right distal radius open reduction and internal fixation;  Surgeon: Pedro Blanca DO;  Location: [facility]     RECONSTRUCT FOREFOOT WITH METATARSOPHALANGEAL (MTP) FUSION  6/10/2014    Procedure: RECONSTRUCT FOREFOOT WITH METATARSOPHALANGEAL (MTP) FUSION;  Surgeon: Ash Gonzalez DPM;  Location: [facility] OR     STENT, CORONARY, DEMI       SURGICAL HISTORY OF -   1999/1974    lt knee     SURGICAL HISTORY OF -   10/2004    lithotripsy     SURGICAL HISTORY OF - 11/05    angiogram with stent       FAMILY HISTORY:  Family History   Problem Relation Age of Onset     Hypertension Father      has had MI      Connective Tissue Disorder Mother      LUPUS     HEART DISEASE Mother      poor valve-needing replacement       SOCIAL HISTORY:  Social History     Social History     Marital status:      Spouse name: Alessandra     Number of children: 2     Years of education: N/A     Social History Main Topics     Smoking status: Never Smoker     Smokeless tobacco: Never Used     Alcohol use 5.0 oz/week     10 Cans of beer per week      Comment: occasional      Drug use: No     Sexual activity: Yes     Partners: Female     Other Topics Concern     Not on file     Social History Narrative       Review of Systems:  Skin:  Negative       Eyes:  Negative      ENT:  Negative      Respiratory:  Negative       Cardiovascular:  Negative for;edema;lightheadedness;dizziness;fatigue Positive for;chest pain when gets anxious will get a little chest tightness   Gastroenterology: Negative      Genitourinary:  Positive for kidney stone problem with kidney stones will f/u with Dr. Wang next week  Musculoskeletal:  Positive for joint pain right wrist had plate put in due to fall this summer   Neurologic:  Negative      Psychiatric:  Negative      Heme/Lymph/Imm:  Positive for allergies    Endocrine:   "Negative        Physical Exam:  Vitals: /88 (BP Location: Left arm, Patient Position: Fowlers, Cuff Size: Adult Large)  Pulse 66  Ht 1.725 m (5' 7.9\")  Wt 92.6 kg (204 lb 3.2 oz)  SpO2 95%  BMI 31.14 kg/m2    Constitutional:  cooperative, alert and oriented, well developed, well nourished, in no acute distress appears anxious      Skin:  warm and dry to the touch, no apparent skin lesions or masses noted        Head:  normocephalic, no masses or lesions        Eyes:  pupils equal and round        ENT:  no pallor or cyanosis, dentition good        Neck:  carotid pulses are full and equal bilaterally, JVP normal, no carotid bruit, no thyromegaly        Chest:  clear to auscultation;normal symmetry          Cardiac: regular rhythm;normal S1 and S2     no presence of murmur            Abdomen:  abdomen soft;BS normoactive        Vascular: pulses full and equal, no bruits auscultated                                   Groin site intact    Extremities and Back:  no edema;no deformities, clubbing, cyanosis, erythema observed              Neurological:  no gross motor deficits;affect appropriate, oriented to time, person and place          AIDEN ALEXANDRE MD    "

## 2017-10-09 NOTE — LETTER
10/9/2017    Jose Hernandez MD  [facility] [address] [facility]   [place] [address]      RE: Quan GANT Jeffrey       Dear Colleague,    I had the pleasure of seeing Quan Murphy in the [facility].    I had the opportunity to see Mr. Quan Murphy in Cardiology Clinic today at the [facility] in [place] for re-evaluation of coronary artery disease.  As you know, he is a 67-year-old gentleman who had left shoulder and arm pain in 12/2016 and was transferred to [facility] where he underwent stenting of a 70% proximal LAD stenosis.  His result was good and his echocardiogram was normal.  He has had some low blood pressure readings and we have eventually taken him off of both metoprolol and lisinopril.  However, he shows me some blood pressures taken at home by his wife who is a nurse and they are running over 140/90 recently.  He occasionally has a little bit of left shoulder discomfort during times of emotional stress.  These episodes are rare and non-exertional.  They are brief and not very severe.      PHYSICAL EXAMINATION:  Today, his blood pressure here was 140/88, heart rate 66 and weight 204 pounds.  His lungs are clear.  Heart rhythm is regular.  There are no cardiac murmurs or carotid bruits.      Outpatient Encounter Prescriptions as of 10/9/2017   Medication Sig Dispense Refill     lisinopril (PRINIVIL/ZESTRIL) 5 MG tablet Take 1 tablet (5 mg) by mouth daily 90 tablet 3     HYDROcodone-acetaminophen (NORCO) 7.5-325 MG per tablet Take 1 tablet by mouth 4 times daily as needed for moderate to severe pain 100 tablet 0     ALPRAZolam (XANAX) 0.5 MG tablet Take 1 tablet (0.5 mg) by mouth 3 times daily as needed for anxiety 90 tablet 1     citalopram (CELEXA) 40 MG tablet Take 1 tablet (40 mg) by mouth daily 30 tablet 6     hydrOXYzine (VISTARIL) 25 MG capsule Take 1 capsule (25 mg) by mouth 3 times daily as needed for  itching 60 capsule 0     zolpidem (AMBIEN) 10 MG tablet Take 1 tablet (10 mg) by mouth nightly as needed for sleep 30 tablet 5     rosuvastatin (CRESTOR) 40 MG tablet Take 1 tablet (40 mg) by mouth daily 90 tablet 3     clopidogrel (PLAVIX) 75 MG tablet Take 1 tablet (75 mg) by mouth daily 90 tablet 3     cetirizine (ZYRTEC) 10 MG tablet Take 10 mg by mouth daily       CIALIS 20 MG tablet Take 20 mg by mouth daily as needed        oxyCODONE (OXY-IR) 5 MG capsule Take 1-2 capsules (5-10 mg) by mouth every 8 hours as needed for moderate to severe pain (Patient not taking: Reported on 10/9/2017) 50 capsule 0     aspirin 81 MG EC tablet Take 1 tablet (81 mg) by mouth daily 90 tablet 3     nitroglycerin (NITROSTAT) 0.4 MG sublingual tablet For chest pain place 1 tablet under the tongue every 5 minutes for 3 doses. If symptoms persist 5 minutes after 1st dose call 911. 25 tablet 0     No facility-administered encounter medications on file as of 10/9/2017.      IMPRESSIONS:  Mr. Quan Murphy is a 67-year-old gentleman with a history of stenting of the proximal LAD in 12/2016.  He has dyslipidemia which is well treated with his current medical therapy.  His blood pressure was running low on medication treatment and his medications were eventually stopped.  Now his blood pressure is running high again and I will restart a low dose of lisinopril 5 mg daily.  I will have him follow up with me again in 1 year, or sooner if his left shoulder discomfort gets to be more typical of angina.      Again, thank you for allowing me to participate in the care of your patient.      Sincerely,    AIDEN ALEXANDRE MD     [facility]

## 2017-10-09 NOTE — PROGRESS NOTES
HISTORY OF PRESENT ILLNESS:  I had the opportunity to see Mr. Chucho Murphy in Cardiology Clinic today at the [facility] in [place] for re-evaluation of coronary artery disease.  As you know, he is a 67-year-old gentleman who had left shoulder and arm pain in 2016 and was transferred to [facility] where he underwent stenting of a 70% proximal LAD stenosis.  His result was good and his echocardiogram was normal.  He has had some low blood pressure readings and we have eventually taken him off of both metoprolol and lisinopril.  However, he shows me some blood pressures taken at home by his wife who is a nurse and they are running over 140/90 recently.  He occasionally has a little bit of left shoulder discomfort during times of emotional stress.  These episodes are rare and non-exertional.  They are brief and not very severe.      PHYSICAL EXAMINATION:  Today, his blood pressure here was 140/88, heart rate 66 and weight 204 pounds.  His lungs are clear.  Heart rhythm is regular.  There are no cardiac murmurs or carotid bruits.      IMPRESSIONS:  Mr. Chucho Murphy is a 67-year-old gentleman with a history of stenting of the proximal LAD in 2016.  He has dyslipidemia which is well treated with his current medical therapy.  His blood pressure was running low on medication treatment and his medications were eventually stopped.  Now his blood pressure is running high again and I will restart a low dose of lisinopril 5 mg daily.  I will have him follow up with me again in 1 year, or sooner if his left shoulder discomfort gets to be more typical of angina.      cc:   Jose Hernandez MD   [address]  [address]         AIDEN ALEXANDRE MD, [facility]             D: 10/09/2017 14:17   T: 10/09/2017 15:03   MT: ELANA      Name:     CHUCHO MURPHY   MRN:      [id number]        Account:      [id number]   :      1950            Service Date: 10/09/2017      Document: [id number]

## 2017-10-09 NOTE — PROGRESS NOTES
HPI and Plan:   See dictation    Orders Placed This Encounter   Procedures     Basic metabolic panel     Lipid Profile     ALT     Follow-Up with Cardiologist       Orders Placed This Encounter   Medications     lisinopril (PRINIVIL/ZESTRIL) 5 MG tablet     Sig: Take 1 tablet (5 mg) by mouth daily     Dispense:  90 tablet     Refill:  3       There are no discontinued medications.      Encounter Diagnoses   Name Primary?     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) Yes     Hypertension goal BP (blood pressure) < 140/90      Hyperlipidemia LDL goal <130        CURRENT MEDICATIONS:  Current Outpatient Prescriptions   Medication Sig Dispense Refill     lisinopril (PRINIVIL/ZESTRIL) 5 MG tablet Take 1 tablet (5 mg) by mouth daily 90 tablet 3     HYDROcodone-acetaminophen (NORCO) 7.5-325 MG per tablet Take 1 tablet by mouth 4 times daily as needed for moderate to severe pain 100 tablet 0     ALPRAZolam (XANAX) 0.5 MG tablet Take 1 tablet (0.5 mg) by mouth 3 times daily as needed for anxiety 90 tablet 1     citalopram (CELEXA) 40 MG tablet Take 1 tablet (40 mg) by mouth daily 30 tablet 6     hydrOXYzine (VISTARIL) 25 MG capsule Take 1 capsule (25 mg) by mouth 3 times daily as needed for itching 60 capsule 0     zolpidem (AMBIEN) 10 MG tablet Take 1 tablet (10 mg) by mouth nightly as needed for sleep 30 tablet 5     rosuvastatin (CRESTOR) 40 MG tablet Take 1 tablet (40 mg) by mouth daily 90 tablet 3     clopidogrel (PLAVIX) 75 MG tablet Take 1 tablet (75 mg) by mouth daily 90 tablet 3     cetirizine (ZYRTEC) 10 MG tablet Take 10 mg by mouth daily       CIALIS 20 MG tablet Take 20 mg by mouth daily as needed        oxyCODONE (OXY-IR) 5 MG capsule Take 1-2 capsules (5-10 mg) by mouth every 8 hours as needed for moderate to severe pain (Patient not taking: Reported on 10/9/2017) 50 capsule 0     aspirin 81 MG EC tablet Take 1 tablet (81 mg) by mouth daily 90  tablet 3     nitroglycerin (NITROSTAT) 0.4 MG sublingual tablet For chest pain place 1 tablet under the tongue every 5 minutes for 3 doses. If symptoms persist 5 minutes after 1st dose call 911. 25 tablet 0       ALLERGIES     Allergies   Allergen Reactions     Animal Dander      Dust Mites      Pollen Extract      Smoke.        PAST MEDICAL HISTORY:  Past Medical History:   Diagnosis Date     Allergy, unspecified not elsewhere classified     Seasonal allergies, pollen, dust, smoke and animals     Coronary atherosclerosis of unspecified type of vessel, native or graft     Coronary artery disease     Malignant neoplasm of prostate (H)     Prostate cancer       PAST SURGICAL HISTORY:  Past Surgical History:   Procedure Laterality Date     ARTHRODESIS FOOT  7/23/2013    Procedure: ARTHRODESIS FOOT;  Great Toe Arthrodesis Left Foot;  Surgeon: Ash Gonzalez DPM;  Location: [facility]     ARTHRODESIS FOOT  6/10/2014    Procedure: ARTHRODESIS FOOT;  Surgeon: Ash Gonzalez DPM;  Location: [facility]     C LAPAROSCOPY, SURGICAL PROSTATECTOMY, RETROPUBIC RADICAL, W/NERVE SPARING  11/30/2004    With full bilateral pelvic lymphadenectomy.  F-[facility].     C TOTAL KNEE ARTHROPLASTY  05/01/08    Left knee     COLONOSCOPY  10/7/2013    Procedure: COLONOSCOPY;  Colonoscopy;  Surgeon: Mike Fallon MD;  Location: [facility]     HC CORRECT BUNION,SIMPLE  08/11/2005    x3     HC REMV TOE BENIGN BONE LESN  08/11/2005     HERNIORRHAPHY INGUINAL  7/3/2013    Procedure: HERNIORRHAPHY INGUINAL;  Open Repair Inguinal hernia Right with mesh ;  Surgeon: Sam Escobar MD;  Location: [facility]     MOHS MICROGRAPHIC PROCEDURE  08/23/11    ear and chin-[facility]     OPEN REDUCTION INTERNAL FIXATION WRIST Right 7/18/2017    Procedure: OPEN REDUCTION INTERNAL FIXATION WRIST;  Right distal radius open reduction and internal fixation;  Surgeon: Pedro Blanca DO;  Location: [facility]     RECONSTRUCT FOREFOOT WITH METATARSOPHALANGEAL  "(MTP) FUSION  6/10/2014    Procedure: RECONSTRUCT FOREFOOT WITH METATARSOPHALANGEAL (MTP) FUSION;  Surgeon: Ash Gonzalez DPM;  Location: [facility]     STENT, CORONARY, DEMI       SURGICAL HISTORY OF -   1999/1974    lt knee     SURGICAL HISTORY OF -   10/2004    lithotripsy     SURGICAL HISTORY OF - 11/05    angiogram with stent       FAMILY HISTORY:  Family History   Problem Relation Age of Onset     Hypertension Father      has had MI      Connective Tissue Disorder Mother      LUPUS     HEART DISEASE Mother      poor valve-needing replacement       SOCIAL HISTORY:  Social History     Social History     Marital status:      Spouse name: Alessandra     Number of children: 2     Years of education: N/A     Social History Main Topics     Smoking status: Never Smoker     Smokeless tobacco: Never Used     Alcohol use 5.0 oz/week     10 Cans of beer per week      Comment: occasional      Drug use: No     Sexual activity: Yes     Partners: Female     Other Topics Concern     Not on file     Social History Narrative       Review of Systems:  Skin:  Negative       Eyes:  Negative      ENT:  Negative      Respiratory:  Negative       Cardiovascular:  Negative for;edema;lightheadedness;dizziness;fatigue Positive for;chest pain when gets anxious will get a little chest tightness   Gastroenterology: Negative      Genitourinary:  Positive for kidney stone problem with kidney stones will f/u with Dr. Wang next week  Musculoskeletal:  Positive for joint pain right wrist had plate put in due to fall this summer   Neurologic:  Negative      Psychiatric:  Negative      Heme/Lymph/Imm:  Positive for allergies    Endocrine:  Negative        Physical Exam:  Vitals: /88 (BP Location: Left arm, Patient Position: Fowlers, Cuff Size: Adult Large)  Pulse 66  Ht 1.725 m (5' 7.9\")  Wt 92.6 kg (204 lb 3.2 oz)  SpO2 95%  BMI 31.14 kg/m2    Constitutional:  cooperative, alert and oriented, well developed, well nourished, in " no acute distress appears anxious      Skin:  warm and dry to the touch, no apparent skin lesions or masses noted        Head:  normocephalic, no masses or lesions        Eyes:  pupils equal and round        ENT:  no pallor or cyanosis, dentition good        Neck:  carotid pulses are full and equal bilaterally, JVP normal, no carotid bruit, no thyromegaly        Chest:  clear to auscultation;normal symmetry          Cardiac: regular rhythm;normal S1 and S2     no presence of murmur            Abdomen:  abdomen soft;BS normoactive        Vascular: pulses full and equal, no bruits auscultated                                   Groin site intact    Extremities and Back:  no edema;no deformities, clubbing, cyanosis, erythema observed              Neurological:  no gross motor deficits;affect appropriate, oriented to time, person and place              CC  Aramis Ch MD  [address]  [place] [id number]

## 2017-10-09 NOTE — TELEPHONE ENCOUNTER
Might have some soreness, but if you have some swelling I would back off a little. Do lighter wait and a few less reps. Then progress slowly as you tolerate.

## 2017-10-09 NOTE — MR AVS SNAPSHOT
After Visit Summary   10/9/2017    Quan Murphy    MRN: [id number]           Patient Information     Date Of Birth          1950        Visit Information        Provider Department      10/9/2017 1:30 PM Aramis Ch MD 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 ([facility]    [address] [facility] [id number] [phone]              Future tests that were ordered for you today     Open Future Orders        Priority Expected Expires Ordered    Basic metabolic panel Routine 10/9/2018 10/10/2018 10/9/2017    Lipid Profile Routine 10/9/2018 10/9/2018 10/9/2017    ALT Routine 10/9/2018 10/9/2018 10/9/2017    Follow-Up with Cardiologist Routine 10/9/2018 10/10/2018 10/9/2017    CT Abdomen Pelvis w/o Contrast Routine  10/9/2018 10/9/2017            Who to contact     If you have questions or need follow up information about today's clinic visit or your schedule please contact [facility] directly at [phone].  Normal or non-critical lab and imaging results will be communicated to you by MyChart, letter or phone within 4 business days after the clinic has received the results. If you do not hear from us within 7 days, please contact the clinic through MyChart or phone. If you have a critical or abnormal lab result, we will notify you by phone as soon as possible.  Submit refill requests through incrediblue or call your pharmacy and they will forward the refill request to us. Please allow 3  "business days for your refill to be completed.          Additional Information About Your Visit        MyChart Information     [employer] gives you secure access to your electronic health record. If you see a primary care provider, you can also send messages to your care team and make appointments. If you have questions, please call your primary care clinic.  If you do not have a primary care provider, please call [phone] and they will assist you.        Care EveryWhere ID     This is your Care EveryWhere ID. This could be used by other organizations to access your [facility] medical records  [id number]        Your Vitals Were     Pulse Height Pulse Oximetry BMI (Body Mass Index)          66 1.725 m (5' 7.9\") 95% 31.14 kg/m2         Blood Pressure from Last 3 Encounters:   10/09/17 140/88   07/18/17 122/70   07/17/17 118/80    Weight from Last 3 Encounters:   10/09/17 92.6 kg (204 lb 3.2 oz)   09/20/17 89.2 kg (196 lb 11.2 oz)   08/18/17 90.9 kg (200 lb 8 oz)              We Performed the Following     Follow-Up with Cardiologist          Today's Medication Changes          These changes are accurate as of: 10/9/17  2:12 PM.  If you have any questions, ask your nurse or doctor.               Start taking these medicines.        Dose/Directions    lisinopril 5 MG tablet   Commonly known as:  PRINIVIL/ZESTRIL   Used for:  Hypertension goal BP (blood pressure) < 140/90   Started by:  Aramis Ch MD        Dose:  5 mg   Take 1 tablet (5 mg) by mouth daily   Quantity:  90 tablet   Refills:  3            Where to get your medicines      These medications were sent to [facility] [place], [place] - [address] [address]     Phone:  [phone]     lisinopril 5 MG tablet                Primary Care Provider Office Phone # Fax #    Jose Hernandez [phone]       [place] [address] [address]        Equal " Access to Services     [facility]: Hadii linda bragg luhking Jermain, waaxda luqadaha, qaybta kaalmamitchell prajapati. So [facility] [place] [facility] [phone].    ATENCIÓN: Si habla español, tiene a recinos disposición servicios gratuitos de asistencia lingüística. Llame al [phone].    We comply with applicable federal civil rights laws and [place] laws. We do not discriminate on the basis of race, color, national origin, age, disability, sex, sexual orientation, or gender identity.            Thank you!     Thank you for choosing [facility]  for your care. Our goal is always to provide you with excellent care. Hearing back from our patients is one way we can continue to improve our services. Please take a few minutes to complete the written survey that you may receive in the mail after your visit with us. Thank you!             Your Updated Medication List - Protect others around you: Learn how to safely use, store and throw away your medicines at www.disposemymeds.org.          This list is accurate as of: 10/9/17  2:12 PM.  Always use your most recent med list.                   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)       cetirizine 10 MG tablet    zyrTEC     Take 10 mg by mouth daily        CIALIS 20 MG tablet   Generic drug:  tadalafil      Take 20 mg by mouth daily as needed        citalopram 40 MG tablet    celeXA    30 tablet    Take 1 tablet (40 mg) by mouth daily    Anxiety       clopidogrel 75 MG tablet    PLAVIX    90 tablet    Take 1 tablet (75 mg) by mouth daily    Coronary artery disease involving native artery of transplanted heart  with unstable angina pectoris (H)       HYDROcodone-acetaminophen 7.5-325 MG per tablet    NORCO    100 tablet    Take 1 tablet by mouth 4 times daily as needed for moderate to severe pain    Arthropathy       hydrOXYzine 25 MG capsule    VISTARIL    60 capsule    Take 1 capsule (25 mg) by mouth 3 times daily as needed for itching        lisinopril 5 MG tablet    PRINIVIL/ZESTRIL    90 tablet    Take 1 tablet (5 mg) by mouth daily    Hypertension goal BP (blood pressure) < 140/90       nitroGLYcerin 0.4 MG sublingual tablet    NITROSTAT    25 tablet    For chest pain place 1 tablet under the tongue every 5 minutes for 3 doses. If symptoms persist 5 minutes after 1st dose call 911.    Atherosclerosis of native coronary artery of native heart with unstable angina pectoris (H)       oxyCODONE 5 MG capsule    OXY-IR    50 capsule    Take 1-2 capsules (5-10 mg) by mouth every 8 hours as needed for moderate to severe pain    Closed Colles' fracture of right radius with routine healing, subsequent encounter       rosuvastatin 40 MG tablet    CRESTOR    90 tablet    Take 1 tablet (40 mg) by mouth daily    Hyperlipidemia LDL goal <130       zolpidem 10 MG tablet    AMBIEN    30 tablet    Take 1 tablet (10 mg) by mouth nightly as needed for sleep    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, insomnia type

## 2017-10-09 NOTE — MR AVS SNAPSHOT
After Visit Summary   10/9/2017    Quan Murphy    MRN: [id number]           Patient Information     Date Of Birth          1950        Visit Information        Provider Department      10/9/2017 2:15 PM NL FLOAT TEAM D [facility]        Today's Diagnoses     Need for pneumococcal vaccination    -  1    Need for prophylactic vaccination and inoculation against influenza           Follow-ups after your visit        Future tests that were ordered for you today     Open Future Orders        Priority Expected Expires Ordered    Basic metabolic panel Routine 10/9/2018 10/10/2018 10/9/2017    Lipid Profile Routine 10/9/2018 10/9/2018 10/9/2017    ALT Routine 10/9/2018 10/9/2018 10/9/2017    Follow-Up with Cardiologist Routine 10/9/2018 10/10/2018 10/9/2017    CT Abdomen Pelvis w/o Contrast Routine  10/9/2018 10/9/2017            Who to contact     If you have questions or need follow up information about today's clinic visit or your schedule please contact [facility] directly at [phone].  Normal or non-critical lab and imaging results will be communicated to you by Astarohart, letter or phone within 4 business days after the clinic has received the results. If you do not hear from us within 7 days, please contact the clinic through Astarohart or phone. If you have a critical or abnormal lab result, we will notify you by phone as soon as possible.  Submit refill requests through BlooBox or call your pharmacy and they will forward the refill request to us. Please allow 3 business days for your refill to be completed.          Additional Information About Your Visit        MyChart Information     Bl[employer] gives you secure access to your electronic health record. If you see a primary care provider, you can also send messages to your care team and make appointments. If you have questions, please call your primary care clinic.  If you do not have a primary care provider,  please call [phone] and they will assist you.        Care EveryWhere ID     This is your Care EveryWhere ID. This could be used by other organizations to access your [facility] medical records  [id number]         Blood Pressure from Last 3 Encounters:   10/09/17 140/88   07/18/17 122/70   07/17/17 118/80    Weight from Last 3 Encounters:   10/09/17 204 lb 3.2 oz (92.6 kg)   09/20/17 196 lb 11.2 oz (89.2 kg)   08/18/17 200 lb 8 oz (90.9 kg)              We Performed the Following     ADMIN INFLUENZA (For MEDICARE Patients ONLY) []     ADMIN MEDICARE: Pneumococcal Vaccine ()     FLU VACCINE, INCREASED ANTIGEN, PRESV FREE, AGE 65+ [44364]     Pneumococcal vaccine 13 valent PCV13 IM (Prevnar) [89220]          Today's Medication Changes          These changes are accurate as of: 10/9/17  2:43 PM.  If you have any questions, ask your nurse or doctor.               Start taking these medicines.        Dose/Directions    lisinopril 5 MG tablet   Commonly known as:  PRINIVIL/ZESTRIL   Used for:  Hypertension goal BP (blood pressure) < 140/90   Started by:  Aramis Ch MD        Dose:  5 mg   Take 1 tablet (5 mg) by mouth daily   Quantity:  90 tablet   Refills:  3            Where to get your medicines      These medications were sent to [facility] [place] [address] [facility]   [address] [place] [address] [facility] [address]     Phone:  [phone]     lisinopril 5 MG tablet                Primary Care Provider Office Phone # Fax #    Jose Hernandez [phone]       [facility] [address] [place] [address]   [facility] [address]        Equal Access to Services     CHERYL HARRIS AH: Hadsaúl Aly, waaxda luqadaha, qaybta kaalmada aderadhayada, mitchell cox. So [facility] [phone].    ATENCIÓN: Si habla español, tiene a recinos disposición servicios gratuitos de asistencia lingüística. Llame al [phone].    We comply with applicable  federal civil rights laws and [place] laws. We do not discriminate on the basis of race, color, national origin, age, disability, sex, sexual orientation, or gender identity.            Thank you!     Thank you for choosing [facility]  for your care. Our goal is always to provide you with excellent care. Hearing back from our patients is one way we can continue to improve our services. Please take a few minutes to complete the written survey that you may receive in the mail after your visit with us. Thank you!             Your Updated Medication List - Protect others around you: Learn how to safely use, store and throw away your medicines at www.disposemymeds.org.          This list is accurate as of: 10/9/17  2:43 PM.  Always use your most recent med list.                   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)       cetirizine 10 MG tablet    zyrTEC     Take 10 mg by mouth daily        CIALIS 20 MG tablet   Generic drug:  tadalafil      Take 20 mg by mouth daily as needed        citalopram 40 MG tablet    celeXA    30 tablet    Take 1 tablet (40 mg) by mouth daily    Anxiety       clopidogrel 75 MG tablet    PLAVIX    90 tablet    Take 1 tablet (75 mg) by mouth daily    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)       HYDROcodone-acetaminophen 7.5-325 MG per tablet    NORCO    100 tablet    Take 1 tablet by mouth 4 times daily as needed for moderate to severe pain    Arthropathy       hydrOXYzine 25 MG capsule    VISTARIL    60 capsule    Take 1 capsule (25 mg) by mouth 3 times daily as needed for itching         lisinopril 5 MG tablet    PRINIVIL/ZESTRIL    90 tablet    Take 1 tablet (5 mg) by mouth daily    Hypertension goal BP (blood pressure) < 140/90       nitroGLYcerin 0.4 MG sublingual tablet    NITROSTAT    25 tablet    For chest pain place 1 tablet under the tongue every 5 minutes for 3 doses. If symptoms persist 5 minutes after 1st dose call 911.    Atherosclerosis of native coronary artery of native heart with unstable angina pectoris (H)       oxyCODONE 5 MG capsule    OXY-IR    50 capsule    Take 1-2 capsules (5-10 mg) by mouth every 8 hours as needed for moderate to severe pain    Closed Colles' fracture of right radius with routine healing, subsequent encounter       rosuvastatin 40 MG tablet    CRESTOR    90 tablet    Take 1 tablet (40 mg) by mouth daily    Hyperlipidemia LDL goal <130       zolpidem 10 MG tablet    AMBIEN    30 tablet    Take 1 tablet (10 mg) by mouth nightly as needed for sleep    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, insomnia type

## 2017-10-11 ENCOUNTER — HOSPITAL ENCOUNTER (OUTPATIENT)
Dept: CT IMAGING | Facility: CLINIC | Age: 67
Discharge: HOME | End: 2017-10-11
Attending: UROLOGY | Admitting: UROLOGY
Payer: MEDICARE

## 2017-10-11 DIAGNOSIS — N20.0 CALCULUS OF KIDNEY: ICD-10-CM

## 2017-10-11 PROCEDURE — 74176 CT ABD & PELVIS W/O CONTRAST: CPT

## 2017-10-12 ENCOUNTER — TRANSFERRED RECORDS (OUTPATIENT)
Dept: HEALTH INFORMATION MANAGEMENT | Facility: CLINIC | Age: 67
End: 2017-10-12

## 2017-10-16 ENCOUNTER — OFFICE VISIT (OUTPATIENT)
Dept: FAMILY MEDICINE | Facility: CLINIC | Age: 67
End: 2017-10-16
Payer: MEDICARE

## 2017-10-16 VITALS
WEIGHT: 202 LBS | SYSTOLIC BLOOD PRESSURE: 100 MMHG | BODY MASS INDEX: 30.62 KG/M2 | HEIGHT: 68 IN | DIASTOLIC BLOOD PRESSURE: 60 MMHG | TEMPERATURE: 98.3 F | HEART RATE: 93 BPM

## 2017-10-16 DIAGNOSIS — M12.9 ARTHROPATHY: ICD-10-CM

## 2017-10-16 DIAGNOSIS — Z01.818 PREOP GENERAL PHYSICAL EXAM: Primary | ICD-10-CM

## 2017-10-16 DIAGNOSIS — N20.0 CALCULUS OF KIDNEY: ICD-10-CM

## 2017-10-16 LAB
ERYTHROCYTE [DISTWIDTH] IN BLOOD BY AUTOMATED COUNT: 14.4 % (ref 10–15)
HCT VFR BLD AUTO: 42 % (ref 40–53)
HGB BLD-MCNC: 13.5 G/DL (ref 13.3–17.7)
MCH RBC QN AUTO: 28.9 PG (ref 26.5–33)
MCHC RBC AUTO-ENTMCNC: 32.1 G/DL (ref 31.5–36.5)
MCV RBC AUTO: 90 FL (ref 78–100)
PLATELET # BLD AUTO: 170 10E9/L (ref 150–450)
RBC # BLD AUTO: 4.67 10E12/L (ref 4.4–5.9)
WBC # BLD AUTO: 4.8 10E9/L (ref 4–11)

## 2017-10-16 PROCEDURE — 85027 COMPLETE CBC AUTOMATED: CPT | Performed by: FAMILY MEDICINE

## 2017-10-16 PROCEDURE — 99214 OFFICE O/P EST MOD 30 MIN: CPT | Performed by: FAMILY MEDICINE

## 2017-10-16 PROCEDURE — 36415 COLL VENOUS BLD VENIPUNCTURE: CPT | Performed by: FAMILY MEDICINE

## 2017-10-16 RX ORDER — HYDROCODONE BITARTRATE AND ACETAMINOPHEN 7.5; 325 MG/1; MG/1
1 TABLET ORAL
Qty: 150 TABLET | Refills: 0 | Status: ON HOLD | OUTPATIENT
Start: 2017-10-16 | End: 2017-10-29

## 2017-10-16 RX ORDER — HYDROXYZINE PAMOATE 25 MG/1
25 CAPSULE ORAL 3 TIMES DAILY PRN
Qty: 60 CAPSULE | Refills: 0 | Status: SHIPPED | OUTPATIENT
Start: 2017-10-16 | End: 2017-11-01

## 2017-10-16 RX ORDER — OXYCODONE HYDROCHLORIDE 5 MG/1
5-10 CAPSULE ORAL EVERY 8 HOURS PRN
Qty: 50 CAPSULE | Refills: 0 | Status: CANCELLED | OUTPATIENT
Start: 2017-10-16

## 2017-10-16 NOTE — MR AVS SNAPSHOT
After Visit Summary   10/16/2017    Quan Murphy    MRN: [id number]           Patient Information     Date Of Birth          1950        Visit Information        Provider Department      10/16/2017 3:00 PM Jose Hernandez MD [facility]        Today's Diagnoses     Preop general physical exam    -  1    Calculus of kidney        Arthropathy          Care Instructions      Before Your Surgery      Call your surgeon if there is any change in your health. This includes signs of a cold or flu (such as a sore throat, runny nose, cough, rash or fever).    Do not smoke, drink alcohol or take over the counter medicine (unless your surgeon or primary care doctor tells you to) for the 24 hours before and after surgery.    If you take prescribed drugs: Follow your doctor s orders about which medicines to take and which to stop until after surgery.    Eating and drinking prior to surgery: follow the instructions from your surgeon    Take a shower or bath the night before surgery. Use the soap your surgeon gave you to gently clean your skin. If you do not have soap from your surgeon, use your regular soap. Do not shave or scrub the surgery site.  Wear clean pajamas and have clean sheets on your bed.           Follow-ups after your visit        Your next 10 appointments already scheduled     Oct 18, 2017   Pro[facility] with Me[facility] Torres [facility]   [facility]    [address]  [facility] [id number] [phone]              Who to contact     If you have questions or need follow up information about today's clinic visit or your schedule please contact [facility] directly at [phone].  Normal or non-critical lab and imaging results will be communicated to you by MyChart, letter or phone within 4 business days after the clinic has received the results. If you do not hear from us within 7 days, please contact the clinic through  "MyChart or phone. If you have a critical or abnormal lab result, we will notify you by phone as soon as possible.  Submit refill requests through [employer] or call your pharmacy and they will forward the refill request to us. Please allow 3 business days for your refill to be completed.          Additional Information About Your Visit        [employer] Information     [employer] gives you secure access to your electronic health record. If you see a primary care provider, you can also send messages to your care team and make appointments. If you have questions, please call your primary care clinic.  If you do not have a primary care provider, please call [phone] and they will assist you.        Care EveryWhere ID     This is your Care EveryWhere ID. This could be used by other organizations to access your [facility] medical records  [id number]        Your Vitals Were     Pulse Temperature Height BMI (Body Mass Index)          93 98.3  F (36.8  C) (Tympanic) 5' 7.9\" (1.725 m) 30.8 kg/m2         Blood Pressure from Last 3 Encounters:   10/16/17 100/60   10/09/17 140/88   07/18/17 122/70    Weight from Last 3 Encounters:   10/16/17 202 lb (91.6 kg)   10/09/17 204 lb 3.2 oz (92.6 kg)   09/20/17 196 lb 11.2 oz (89.2 kg)              We Performed the Following     CBC with platelets          Today's Medication Changes          These changes are accurate as of: 10/16/17  5:13 PM.  If you have any questions, ask your nurse or doctor.               These medicines have changed or have updated prescriptions.        Dose/Directions    HYDROcodone-acetaminophen 7.5-325 MG per tablet   Commonly known as:  NORCO   This may have changed:    - when to take this  - reasons to take this   Used for:  Arthropathy   Changed by:  Jose Hernandez MD        Dose:  1 tablet   Take 1 tablet by mouth 5 times daily   Quantity:  150 tablet   Refills:  0       hydrOXYzine 25 MG capsule   Commonly known as:  VISTARIL   This may have changed:  " reasons to take this   Used for:  Arthropathy   Changed by:  Jose Hernandez MD        Dose:  25 mg   Take 1 capsule (25 mg) by mouth 3 times daily as needed for anxiety   Quantity:  60 capsule   Refills:  0            Where to get your medicines      These medications were sent to [facility], [place] - [address] [place] [facility] [place] [address]     Phone:  [phone]     hydrOXYzine 25 MG capsule         Some of these will need a paper prescription and others can be bought over the counter.  Ask your nurse if you have questions.     Bring a paper prescription for each of these medications     HYDROcodone-acetaminophen 7.5-325 MG per tablet                Primary Care Provider Office Phone # Fax #    Jose Hernandez [phone]       [address]   [place] [facility] [address]        Equal Access to Services     [facility]: Hadii aad ku hadasho Soomaali, waaxda luqadaha, qaybta kaalmada adeegyada, waxay padmajain hayaan kay hooper . So [facility] [phone].    ATENCIÓN: Si habla español, tiene a recinos disposición servicios gratuitos de asistencia lingüística. Llame al [phone].    We comply with applicable federal civil rights laws and [place] laws. We do not discriminate on the basis of race, color, national origin, age, disability, sex, sexual orientation, or gender identity.            Thank you!     Thank you for choosing [facility]  for your care. Our goal is always to provide you with excellent care. Hearing back from our patients is one way we can continue to improve our services. Please take a few minutes to complete the written survey that you may receive in the mail after your visit with us. Thank you!             Your Updated Medication List - Protect others around you: Learn how to safely use, store and throw away your medicines at www.disposemymeds.org.          This list is accurate as of:  10/16/17  5:13 PM.  Always use your most recent med list.                   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)       cetirizine 10 MG tablet    zyrTEC     Take 10 mg by mouth daily        CIALIS 20 MG tablet   Generic drug:  tadalafil      Take 20 mg by mouth daily as needed        citalopram 40 MG tablet    celeXA    30 tablet    Take 1 tablet (40 mg) by mouth daily    Anxiety       clopidogrel 75 MG tablet    PLAVIX    90 tablet    Take 1 tablet (75 mg) by mouth daily    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 If symptoms persist 5 minutes after 1st dose call 911.    Atherosclerosis of native coronary artery of native heart with unstable angina pectoris (H)       oxyCODONE 5 MG capsule    OXY-IR    50 capsule    Take 1-2 capsules (5-10 mg) by mouth every 8 hours as needed for moderate to severe pain    Closed Colles' fracture of right radius with routine healing, subsequent encounter       rosuvastatin 40 MG tablet    CRESTOR    90 tablet    Take 1  tablet (40 mg) by mouth daily    Hyperlipidemia LDL goal <130       zolpidem 10 MG tablet    AMBIEN    30 tablet    Take 1 tablet (10 mg) by mouth nightly as needed for sleep    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, insomnia type

## 2017-10-16 NOTE — PROGRESS NOTES
"[address] [address]  [phone]  Dept: [phone]    PRE-OP EVALUATION:  Today's date: 10/16/2017    Quan Murphy (: 1950) presents for pre-operative evaluation assessment as requested by Dr. Torres.  He requires evaluation and anesthesia risk assessment prior to undergoing surgery/procedure for treatment of Stones .  Proposed procedure: Extracorporeal shock wave lithotripsy, bilateral     Patient would like to discuss getting pain meds before his surgery.  He has had this done before and remembers it being very painful.  He has been using more than he should due to the severity of the back pain he's been experiencing. He would also like a refill of his vistaril.     Date of Surgery/ Procedure: 10/18/2017  Time of Surgery/ Procedure: 1535  Hospital/Surgical Facility:  OR     Primary Physician: Jose Hernandez  Type of Anesthesia Anticipated: General    Patient has a Health Care Directive or Living Will:  YES Wife will be brining an up to date one today.      PREOP QUESTIONNAIRE OPTIONS  1. YES - Do you have a history of heart attack, stroke, stent, bypass or surgery on an artery in the head, neck, heart or legs? \" maker\" and iliac artery.  2. NO - Do you ever have any pain or discomfort in your chest?  3. NO - Do you have a history of  Heart Failure?  4. NO - Are you troubled by shortness of breath when: walking on the level, up a slight hill or at night?  5. NO - Do you currently have a cold, bronchitis or other respiratory infection?  6. NO - Do you have a cough, shortness of breath or wheezing?  7. NO - Do you sometimes get pains in the calves of your legs when you walk?  8. NO - Do you or anyone in your family have previous history of blood clots?  9. NO - Do you or does anyone in your family have a serious bleeding problem such as prolonged bleeding following surgeries or cuts?  10. NO - Have you ever had problems with anemia or " been told to take iron pills?  11. NO - Have you had any abnormal blood loss such as black, tarry or bloody stools, or abnormal vaginal bleeding?  12. YES - Have you ever had a blood transfusion? Patients own blood.   13. NO - Have you or any of your relatives ever had problems with anesthesia?  14. NO - Do you have sleep apnea, excessive snoring or daytime drowsiness?  15. NO - Do you have any prosthetic heart valves?  16. YES - Do you have prosthetic joints? Left knee  17. NO - Is there any chance that you may be pregnant?        HPI:                                                      Brief HPI related to upcoming procedure: Recurrence of multiple kidney stones          MEDICAL HISTORY:                                                    Patient Active Problem List    Diagnosis Date Noted     S/P ORIF (open reduction internal fixation) fracture 08/03/2017     Priority: Medium     Closed Colles' fracture of right radius with routine healing, subsequent encounter 08/03/2017     Priority: Medium     Status post insertion of drug-eluting stent into left anterior descending artery for coronary artery disease 01/05/2017     Priority: Medium     Chest pain 12/24/2016     Priority: Medium     Arthropathy 02/04/2014     Priority: Medium     Problem list name updated by automated process. Provider to review       Coronary atherosclerosis      Priority: Medium     Coronary artery disease  Problem list name updated by automated process. Provider to review       Hallux rigidus 07/16/2013     Priority: Medium     Inguinal hernia 06/28/2013     Priority: Medium     Degenerative arthritis of foot 06/28/2013     Priority: Medium     Advanced directives, counseling/discussion 05/24/2013     Priority: Medium     Advance Care Planning:   Receipt of ACP document:  Received: Health Care Directive which was witnessed or notarized on 8-18-08.  Document previously scanned on 7-8-13.  Validation form completed and sent to be scanned.   Code Status needs to be updated to reflect choices in most recent ACP document. Confirmed/documented designated decision maker(s). See permanent comments section of demographics in clinical tab. View document(s) and details by clicking on code status.   Added by Vandana Platt RN, System ACP Coordinator on 7/22/2013.    Advance Care Planning:   ACP Review and Resources Provided:  Reviewed chart for advance care plan.  Quan Murphy has no plan or code status on file however states presence of ACP document. Copy requested. Confirmed code status reflects current choices pending receipt of document/advance care plan review. Confirmed/documented designated decision maker(s). See permanent comments section of demographics in clinical tab.   Added by Krysten Jasmine on 5/24/2013             Hypertension goal BP (blood pressure) < 140/90 06/12/2012     Priority: Medium     Hyperlipidemia LDL goal <130 12/22/2011     Priority: Medium     Anxiety 11/02/2011     Priority: Medium     Allergic conjunctivitis 07/08/2008     Priority: Medium     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, insomnia type 11/17/2006     Priority: Medium     Problem list name updated by automated process. Provider to review       Acute reaction to stress 01/12/2005     Priority: Medium     Problem list name updated by automated process. Provider to review       Chronic maxillary sinusitis 01/12/2005     Priority: Medium     Contracture of palmar fascia 01/12/2005     Priority: Medium     Benign neoplasm of skin of other and unspecified parts of face 01/12/2005     Priority: Medium     Malignant neoplasm of prostate (H) 11/26/2004     Priority: Medium      Past Medical History:   Diagnosis Date     Allergy, unspecified not elsewhere classified     Seasonal allergies, pollen, dust, smoke and animals     Coronary atherosclerosis of unspecified type of vessel, native or graft     Coronary artery disease     Malignant neoplasm of prostate (H)      Prostate cancer     Past Surgical History:   Procedure Laterality Date     ARTHRODESIS FOOT  7/23/2013    Procedure: ARTHRODESIS FOOT;  Great Toe Arthrodesis Left Foot;  Surgeon: Ash Gonzalez DPM;  Location: [facility] OR     ARTHRODESIS FOOT  6/10/2014    Procedure: ARTHRODESIS FOOT;  Surgeon: Ash Gonzalez DPM;  Location: [facility] OR     C LAPAROSCOPY, SURGICAL PROSTATECTOMY, RETROPUBIC RADICAL, W/NERVE SPARING  11/30/2004    With full bilateral pelvic lymphadenectomy.  F-[facility].     C TOTAL KNEE ARTHROPLASTY  05/01/08    Left knee     COLONOSCOPY  10/7/2013    Procedure: COLONOSCOPY;  Colonoscopy;  Surgeon: Mike Fallon MD;  Location: [facility] GI     HC CORRECT BUNION,SIMPLE  08/11/2005    x3     HC REMV TOE BENIGN BONE LESN  08/11/2005     HERNIORRHAPHY INGUINAL  7/3/2013    Procedure: HERNIORRHAPHY INGUINAL;  Open Repair Inguinal hernia Right with mesh ;  Surgeon: Sam Escobar MD;  Location: [facility] OR     MOHS MICROGRAPHIC PROCEDURE  08/23/11    ear and chin-[facility]     OPEN REDUCTION INTERNAL FIXATION WRIST Right 7/18/2017    Procedure: OPEN REDUCTION INTERNAL FIXATION WRIST;  Right distal radius open reduction and internal fixation;  Surgeon: Pedro Blanca DO;  Location: [facility] OR     RECONSTRUCT FOREFOOT WITH METATARSOPHALANGEAL (MTP) FUSION  6/10/2014    Procedure: RECONSTRUCT FOREFOOT WITH METATARSOPHALANGEAL (MTP) FUSION;  Surgeon: Ash Gonzalez DPM;  Location: [facility] OR     STENT, CORONARY, DEMI       SURGICAL HISTORY OF -   1999/1974    lt knee     SURGICAL HISTORY OF -   10/2004    lithotripsy     SURGICAL HISTORY OF - 11/05    angiogram with stent     Current Outpatient Prescriptions   Medication Sig Dispense Refill     lisinopril (PRINIVIL/ZESTRIL) 5 MG tablet Take 1 tablet (5 mg) by mouth daily 90 tablet 3     HYDROcodone-acetaminophen (NORCO) 7.5-325 MG per tablet Take 1 tablet by mouth 4 times daily as needed for moderate to severe pain 100 tablet 0     ALPRAZolam (XANAX)  0.5 MG tablet Take 1 tablet (0.5 mg) by mouth 3 times daily as needed for anxiety 90 tablet 1     citalopram (CELEXA) 40 MG tablet Take 1 tablet (40 mg) by mouth daily 30 tablet 6     hydrOXYzine (VISTARIL) 25 MG capsule Take 1 capsule (25 mg) by mouth 3 times daily as needed for itching 60 capsule 0     oxyCODONE (OXY-IR) 5 MG capsule Take 1-2 capsules (5-10 mg) by mouth every 8 hours as needed for moderate to severe pain (Patient not taking: Reported on 10/9/2017) 50 capsule 0     zolpidem (AMBIEN) 10 MG tablet Take 1 tablet (10 mg) by mouth nightly as needed for sleep 30 tablet 5     rosuvastatin (CRESTOR) 40 MG tablet Take 1 tablet (40 mg) by mouth daily 90 tablet 3     clopidogrel (PLAVIX) 75 MG tablet Take 1 tablet (75 mg) by mouth daily 90 tablet 3     aspirin 81 MG EC tablet Take 1 tablet (81 mg) by mouth daily 90 tablet 3     nitroglycerin (NITROSTAT) 0.4 MG sublingual tablet For chest pain place 1 tablet under the tongue every 5 minutes for 3 doses. If symptoms persist 5 minutes after 1st dose call 911. 25 tablet 0     cetirizine (ZYRTEC) 10 MG tablet Take 10 mg by mouth daily       CIALIS 20 MG tablet Take 20 mg by mouth daily as needed        OTC products: Aspirin    Allergies   Allergen Reactions     Animal Dander      Dust Mites      Pollen Extract      Smoke.       Latex Allergy: NO    Social History   Substance Use Topics     Smoking status: Never Smoker     Smokeless tobacco: Never Used     Alcohol use 5.0 oz/week     10 Cans of beer per week      Comment: occasional      History   Drug Use No       REVIEW OF SYSTEMS:                                                    Constitutional, neuro, ENT, endocrine, pulmonary, cardiac, gastrointestinal, genitourinary, musculoskeletal, integument and psychiatric systems are negative, except as otherwise noted.      EXAM:                                                    There were no vitals taken for this visit.    GENERAL APPEARANCE: healthy, alert and no  distress     EYES: EOMI,  PERRL     HENT: ear canals and TM's normal and nose and mouth without ulcers or lesions     NECK: no adenopathy, no asymmetry, masses, or scars and thyroid normal to palpation     RESP: lungs clear to auscultation - no rales, rhonchi or wheezes     CV: regular rates and rhythm, normal S1 S2, no S3 or S4 and no murmur, click or rub     ABDOMEN:  soft, nontender, no HSM or masses and bowel sounds normal     MS: extremities normal- no gross deformities noted, no evidence of inflammation in joints, FROM in all extremities.     SKIN: no suspicious lesions or rashes     NEURO: Normal strength and tone, sensory exam grossly normal, mentation intact and speech normal     PSYCH: mentation appears normal. and affect normal/bright     LYMPHATICS: No axillary, cervical, or supraclavicular nodes    DIAGNOSTICS:                                                    EKG: appears normal, NSR, normal axis, normal intervals, no acute ST/T changes c/w ischemia, no LVH by voltage criteria, this was from July 2017    Recent Labs   Lab Test  10/07/17   1009  07/17/17   0936  07/14/17   1923  12/28/16   0544   HGB   --   13.1*  13.8  14.2   PLT   --    --   174  127*   NA  144   --   141  138   POTASSIUM  4.5  4.3  3.5  4.5   CR  1.00  1.24  1.17  1.15        IMPRESSION:                                                    Reason for surgery/procedure: Recurrent multiple kidney stones     The proposed surgical procedure is considered INTERMEDIATE risk.    REVISED CARDIAC RISK INDEX  The patient has the following serious cardiovascular risks for perioperative complications such as (MI, PE, VFib and 3  AV Block):  Coronary artery disease and placement     INTERPRETATION: 1 risks: Class II (low risk - 0.9% complication rate)    The patient has the following additional risks for perioperative complications:  No identified additional risks    No diagnosis found.    RECOMMENDATIONS:                                                           --Patient is to take all scheduled medications on the day of surgery EXCEPT for modifications listed below.    Anticoagulant or Antiplatelet Medication Use  ASPIRIN: Discontinue ASA 7-10 days prior to procedure to reduce bleeding risk.  It should be resumed post-operatively.  PLAVIX: No contraindication to stopping plavix.  Stop 7-10 days prior to surgery          APPROVAL GIVEN to proceed with proposed procedure, without further diagnostic evaluation       Signed Electronically by: Jose Hernandez MD    Copy of this evaluation report is provided to requesting physician.    [facility] Preop Guidelines

## 2017-10-16 NOTE — NURSING NOTE
"Chief Complaint   Patient presents with     Pre-Op Exam     bilateral lithotripsy        Initial /60 (BP Location: Left arm, Patient Position: Chair, Cuff Size: Adult Large)  Pulse 93  Temp 98.3  F (36.8  C) (Tympanic)  Ht 5' 7.9\" (1.725 m)  Wt 202 lb (91.6 kg)  BMI 30.8 kg/m2 Estimated body mass index is 30.8 kg/(m^2) as calculated from the following:    Height as of this encounter: 5' 7.9\" (1.725 m).    Weight as of this encounter: 202 lb (91.6 kg).  Medication Reconciliation: complete    "

## 2017-10-17 NOTE — H&P (VIEW-ONLY)
"[address] [address]  [phone]  Dept: [phone]    PRE-OP EVALUATION:  Today's date: 10/16/2017    Quan Murphy (: 1950) presents for pre-operative evaluation assessment as requested by Dr. Torres.  He requires evaluation and anesthesia risk assessment prior to undergoing surgery/procedure for treatment of Stones .  Proposed procedure: Extracorporeal shock wave lithotripsy, bilateral     Patient would like to discuss getting pain meds before his surgery.  He has had this done before and remembers it being very painful.  He has been using more than he should due to the severity of the back pain he's been experiencing. He would also like a refill of his vistaril.     Date of Surgery/ Procedure: 10/18/2017  Time of Surgery/ Procedure: 1535  Hospital/Surgical Facility:  OR     Primary Physician: Jose Hernandez  Type of Anesthesia Anticipated: General    Patient has a Health Care Directive or Living Will:  YES Wife will be brining an up to date one today.      PREOP QUESTIONNAIRE OPTIONS  1. YES - Do you have a history of heart attack, stroke, stent, bypass or surgery on an artery in the head, neck, heart or legs? \" maker\" and iliac artery.  2. NO - Do you ever have any pain or discomfort in your chest?  3. NO - Do you have a history of  Heart Failure?  4. NO - Are you troubled by shortness of breath when: walking on the level, up a slight hill or at night?  5. NO - Do you currently have a cold, bronchitis or other respiratory infection?  6. NO - Do you have a cough, shortness of breath or wheezing?  7. NO - Do you sometimes get pains in the calves of your legs when you walk?  8. NO - Do you or anyone in your family have previous history of blood clots?  9. NO - Do you or does anyone in your family have a serious bleeding problem such as prolonged bleeding following surgeries or cuts?  10. NO - Have you ever had problems with anemia or " been told to take iron pills?  11. NO - Have you had any abnormal blood loss such as black, tarry or bloody stools, or abnormal vaginal bleeding?  12. YES - Have you ever had a blood transfusion? Patients own blood.   13. NO - Have you or any of your relatives ever had problems with anesthesia?  14. NO - Do you have sleep apnea, excessive snoring or daytime drowsiness?  15. NO - Do you have any prosthetic heart valves?  16. YES - Do you have prosthetic joints? Left knee  17. NO - Is there any chance that you may be pregnant?        HPI:                                                      Brief HPI related to upcoming procedure: Recurrence of multiple kidney stones          MEDICAL HISTORY:                                                    Patient Active Problem List    Diagnosis Date Noted     S/P ORIF (open reduction internal fixation) fracture 08/03/2017     Priority: Medium     Closed Colles' fracture of right radius with routine healing, subsequent encounter 08/03/2017     Priority: Medium     Status post insertion of drug-eluting stent into left anterior descending artery for coronary artery disease 01/05/2017     Priority: Medium     Chest pain 12/24/2016     Priority: Medium     Arthropathy 02/04/2014     Priority: Medium     Problem list name updated by automated process. Provider to review       Coronary atherosclerosis      Priority: Medium     Coronary artery disease  Problem list name updated by automated process. Provider to review       Hallux rigidus 07/16/2013     Priority: Medium     Inguinal hernia 06/28/2013     Priority: Medium     Degenerative arthritis of foot 06/28/2013     Priority: Medium     Advanced directives, counseling/discussion 05/24/2013     Priority: Medium     Advance Care Planning:   Receipt of ACP document:  Received: Health Care Directive which was witnessed or notarized on 8-18-08.  Document previously scanned on 7-8-13.  Validation form completed and sent to be scanned.   Code Status needs to be updated to reflect choices in most recent ACP document. Confirmed/documented designated decision maker(s). See permanent comments section of demographics in clinical tab. View document(s) and details by clicking on code status.   Added by Vandana Platt RN, System ACP Coordinator on 7/22/2013.    Advance Care Planning:   ACP Review and Resources Provided:  Reviewed chart for advance care plan.  Quan Murphy has no plan or code status on file however states presence of ACP document. Copy requested. Confirmed code status reflects current choices pending receipt of document/advance care plan review. Confirmed/documented designated decision maker(s). See permanent comments section of demographics in clinical tab.   Added by Krysten Jasmine on 5/24/2013             Hypertension goal BP (blood pressure) < 140/90 06/12/2012     Priority: Medium     Hyperlipidemia LDL goal <130 12/22/2011     Priority: Medium     Anxiety 11/02/2011     Priority: Medium     Allergic conjunctivitis 07/08/2008     Priority: Medium     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, insomnia type 11/17/2006     Priority: Medium     Problem list name updated by automated process. Provider to review       Acute reaction to stress 01/12/2005     Priority: Medium     Problem list name updated by automated process. Provider to review       Chronic maxillary sinusitis 01/12/2005     Priority: Medium     Contracture of palmar fascia 01/12/2005     Priority: Medium     Benign neoplasm of skin of other and unspecified parts of face 01/12/2005     Priority: Medium     Malignant neoplasm of prostate (H) 11/26/2004     Priority: Medium      Past Medical History:   Diagnosis Date     Allergy, unspecified not elsewhere classified     Seasonal allergies, pollen, dust, smoke and animals     Coronary atherosclerosis of unspecified type of vessel, native or graft     Coronary artery disease     Malignant neoplasm of prostate (H)      Prostate cancer     Past Surgical History:   Procedure Laterality Date     ARTHRODESIS FOOT  7/23/2013    Procedure: ARTHRODESIS FOOT;  Great Toe Arthrodesis Left Foot;  Surgeon: Ash Gonzalez DPM;  Location: [facility] OR     ARTHRODESIS FOOT  6/10/2014    Procedure: ARTHRODESIS FOOT;  Surgeon: Ash Gonzalez DPM;  Location: [facility] OR     C LAPAROSCOPY, SURGICAL PROSTATECTOMY, RETROPUBIC RADICAL, W/NERVE SPARING  11/30/2004    With full bilateral pelvic lymphadenectomy.  F-[facility].     C TOTAL KNEE ARTHROPLASTY  05/01/08    Left knee     COLONOSCOPY  10/7/2013    Procedure: COLONOSCOPY;  Colonoscopy;  Surgeon: Mike Fallon MD;  Location: [facility] GI     HC CORRECT BUNION,SIMPLE  08/11/2005    x3     HC REMV TOE BENIGN BONE LESN  08/11/2005     HERNIORRHAPHY INGUINAL  7/3/2013    Procedure: HERNIORRHAPHY INGUINAL;  Open Repair Inguinal hernia Right with mesh ;  Surgeon: Sam Escobar MD;  Location: [facility] OR     MOHS MICROGRAPHIC PROCEDURE  08/23/11    ear and chin-[facility]     OPEN REDUCTION INTERNAL FIXATION WRIST Right 7/18/2017    Procedure: OPEN REDUCTION INTERNAL FIXATION WRIST;  Right distal radius open reduction and internal fixation;  Surgeon: Pedro Blanca DO;  Location: [facility]     RECONSTRUCT FOREFOOT WITH METATARSOPHALANGEAL (MTP) FUSION  6/10/2014    Procedure: RECONSTRUCT FOREFOOT WITH METATARSOPHALANGEAL (MTP) FUSION;  Surgeon: Ash Gonzalez DPM;  Location: [facility]     STENT, CORONARY, DEMI       SURGICAL HISTORY OF -   1999/1974    lt knee     SURGICAL HISTORY OF -   10/2004    lithotripsy     SURGICAL HISTORY OF - 11/05    angiogram with stent     Current Outpatient Prescriptions   Medication Sig Dispense Refill     lisinopril (PRINIVIL/ZESTRIL) 5 MG tablet Take 1 tablet (5 mg) by mouth daily 90 tablet 3     HYDROcodone-acetaminophen (NORCO) 7.5-325 MG per tablet Take 1 tablet by mouth 4 times daily as needed for moderate to severe pain 100 tablet 0     ALPRAZolam (XANAX)  0.5 MG tablet Take 1 tablet (0.5 mg) by mouth 3 times daily as needed for anxiety 90 tablet 1     citalopram (CELEXA) 40 MG tablet Take 1 tablet (40 mg) by mouth daily 30 tablet 6     hydrOXYzine (VISTARIL) 25 MG capsule Take 1 capsule (25 mg) by mouth 3 times daily as needed for itching 60 capsule 0     oxyCODONE (OXY-IR) 5 MG capsule Take 1-2 capsules (5-10 mg) by mouth every 8 hours as needed for moderate to severe pain (Patient not taking: Reported on 10/9/2017) 50 capsule 0     zolpidem (AMBIEN) 10 MG tablet Take 1 tablet (10 mg) by mouth nightly as needed for sleep 30 tablet 5     rosuvastatin (CRESTOR) 40 MG tablet Take 1 tablet (40 mg) by mouth daily 90 tablet 3     clopidogrel (PLAVIX) 75 MG tablet Take 1 tablet (75 mg) by mouth daily 90 tablet 3     aspirin 81 MG EC tablet Take 1 tablet (81 mg) by mouth daily 90 tablet 3     nitroglycerin (NITROSTAT) 0.4 MG sublingual tablet For chest pain place 1 tablet under the tongue every 5 minutes for 3 doses. If symptoms persist 5 minutes after 1st dose call 911. 25 tablet 0     cetirizine (ZYRTEC) 10 MG tablet Take 10 mg by mouth daily       CIALIS 20 MG tablet Take 20 mg by mouth daily as needed        OTC products: Aspirin    Allergies   Allergen Reactions     Animal Dander      Dust Mites      Pollen Extract      Smoke.       Latex Allergy: NO    Social History   Substance Use Topics     Smoking status: Never Smoker     Smokeless tobacco: Never Used     Alcohol use 5.0 oz/week     10 Cans of beer per week      Comment: occasional      History   Drug Use No       REVIEW OF SYSTEMS:                                                    Constitutional, neuro, ENT, endocrine, pulmonary, cardiac, gastrointestinal, genitourinary, musculoskeletal, integument and psychiatric systems are negative, except as otherwise noted.      EXAM:                                                    There were no vitals taken for this visit.    GENERAL APPEARANCE: healthy, alert and no  distress     EYES: EOMI,  PERRL     HENT: ear canals and TM's normal and nose and mouth without ulcers or lesions     NECK: no adenopathy, no asymmetry, masses, or scars and thyroid normal to palpation     RESP: lungs clear to auscultation - no rales, rhonchi or wheezes     CV: regular rates and rhythm, normal S1 S2, no S3 or S4 and no murmur, click or rub     ABDOMEN:  soft, nontender, no HSM or masses and bowel sounds normal     MS: extremities normal- no gross deformities noted, no evidence of inflammation in joints, FROM in all extremities.     SKIN: no suspicious lesions or rashes     NEURO: Normal strength and tone, sensory exam grossly normal, mentation intact and speech normal     PSYCH: mentation appears normal. and affect normal/bright     LYMPHATICS: No axillary, cervical, or supraclavicular nodes    DIAGNOSTICS:                                                    EKG: appears normal, NSR, normal axis, normal intervals, no acute ST/T changes c/w ischemia, no LVH by voltage criteria, this was from July 2017    Recent Labs   Lab Test  10/07/17   1009  07/17/17   0936  07/14/17   1923  12/28/16   0544   HGB   --   13.1*  13.8  14.2   PLT   --    --   174  127*   NA  144   --   141  138   POTASSIUM  4.5  4.3  3.5  4.5   CR  1.00  1.24  1.17  1.15        IMPRESSION:                                                    Reason for surgery/procedure: Recurrent multiple kidney stones     The proposed surgical procedure is considered INTERMEDIATE risk.    REVISED CARDIAC RISK INDEX  The patient has the following serious cardiovascular risks for perioperative complications such as (MI, PE, VFib and 3  AV Block):  Coronary artery disease and placement     INTERPRETATION: 1 risks: Class II (low risk - 0.9% complication rate)    The patient has the following additional risks for perioperative complications:  No identified additional risks    No diagnosis found.    RECOMMENDATIONS:                                                           --Patient is to take all scheduled medications on the day of surgery EXCEPT for modifications listed below.    Anticoagulant or Antiplatelet Medication Use  ASPIRIN: Discontinue ASA 7-10 days prior to procedure to reduce bleeding risk.  It should be resumed post-operatively.  PLAVIX: No contraindication to stopping plavix.  Stop 7-10 days prior to surgery          APPROVAL GIVEN to proceed with proposed procedure, without further diagnostic evaluation       Signed Electronically by: Jose Hernandez MD    Copy of this evaluation report is provided to requesting physician.    [facility] Preop Guidelines

## 2017-10-18 ENCOUNTER — ANESTHESIA EVENT (OUTPATIENT)
Dept: SURGERY | Facility: CLINIC | Age: 67
End: 2017-10-18
Payer: MEDICARE

## 2017-10-18 ENCOUNTER — HOSPITAL ENCOUNTER (OUTPATIENT)
Facility: CLINIC | Age: 67
Setting detail: OBSERVATION
Discharge: HOME | End: 2017-10-19
Attending: UROLOGY | Admitting: INTERNAL MEDICINE
Payer: MEDICARE

## 2017-10-18 ENCOUNTER — ANESTHESIA (OUTPATIENT)
Dept: SURGERY | Facility: CLINIC | Age: 67
End: 2017-10-18
Payer: MEDICARE

## 2017-10-18 ENCOUNTER — SURGERY (OUTPATIENT)
Age: 67
End: 2017-10-18

## 2017-10-18 DIAGNOSIS — N20.0 RENAL STONES: Primary | ICD-10-CM

## 2017-10-18 PROBLEM — R55 SYNCOPE: Status: ACTIVE | Noted: 2017-10-18

## 2017-10-18 LAB
CREAT SERPL-MCNC: 1.67 MG/DL (ref 0.66–1.25)
GFR SERPL CREATININE-BSD FRML MDRD: 41 ML/MIN/1.7M2

## 2017-10-18 PROCEDURE — 99207 ZZC CONSULT E&M CHANGED TO INITIAL LEVEL: CPT | Performed by: INTERNAL MEDICINE

## 2017-10-18 PROCEDURE — 25000128 H RX IP 250 OP 636: Performed by: INTERNAL MEDICINE

## 2017-10-18 PROCEDURE — 25000125 ZZHC RX 250: Performed by: NURSE ANESTHETIST, CERTIFIED REGISTERED

## 2017-10-18 PROCEDURE — 99220 ZZC INITIAL OBSERVATION CARE,LEVL III: CPT | Performed by: INTERNAL MEDICINE

## 2017-10-18 PROCEDURE — 40000648 ZZH STATISTIC LITHOTRIPSY IDENTIFIER: Performed by: UROLOGY

## 2017-10-18 PROCEDURE — 25000128 H RX IP 250 OP 636: Performed by: ANESTHESIOLOGY

## 2017-10-18 PROCEDURE — 25000132 ZZH RX MED GY IP 250 OP 250 PS 637: Mod: GY | Performed by: INTERNAL MEDICINE

## 2017-10-18 PROCEDURE — 25000128 H RX IP 250 OP 636: Performed by: UROLOGY

## 2017-10-18 PROCEDURE — A9270 NON-COVERED ITEM OR SERVICE: HCPCS | Mod: GY | Performed by: INTERNAL MEDICINE

## 2017-10-18 PROCEDURE — 36000063 ZZH SURGERY LEVEL 4 EA 15 ADDTL MIN: Performed by: UROLOGY

## 2017-10-18 PROCEDURE — 40000170 ZZH STATISTIC PRE-PROCEDURE ASSESSMENT II: Performed by: UROLOGY

## 2017-10-18 PROCEDURE — 25000128 H RX IP 250 OP 636: Performed by: NURSE ANESTHETIST, CERTIFIED REGISTERED

## 2017-10-18 PROCEDURE — A9270 NON-COVERED ITEM OR SERVICE: HCPCS | Mod: GY | Performed by: UROLOGY

## 2017-10-18 PROCEDURE — 37000008 ZZH ANESTHESIA TECHNICAL FEE, 1ST 30 MIN: Performed by: UROLOGY

## 2017-10-18 PROCEDURE — 37000009 ZZH ANESTHESIA TECHNICAL FEE, EACH ADDTL 15 MIN: Performed by: UROLOGY

## 2017-10-18 PROCEDURE — 71000012 ZZH RECOVERY PHASE 1 LEVEL 1 FIRST HR: Performed by: UROLOGY

## 2017-10-18 PROCEDURE — 71000013 ZZH RECOVERY PHASE 1 LEVEL 1 EA ADDTL HR: Performed by: UROLOGY

## 2017-10-18 PROCEDURE — G0378 HOSPITAL OBSERVATION PER HR: HCPCS

## 2017-10-18 PROCEDURE — 36415 COLL VENOUS BLD VENIPUNCTURE: CPT | Performed by: INTERNAL MEDICINE

## 2017-10-18 PROCEDURE — 82565 ASSAY OF CREATININE: CPT | Performed by: INTERNAL MEDICINE

## 2017-10-18 PROCEDURE — 25000132 ZZH RX MED GY IP 250 OP 250 PS 637: Mod: GY | Performed by: UROLOGY

## 2017-10-18 PROCEDURE — 36000093 ZZH SURGERY LEVEL 4 1ST 30 MIN: Performed by: UROLOGY

## 2017-10-18 RX ORDER — SODIUM CHLORIDE, SODIUM LACTATE, POTASSIUM CHLORIDE, CALCIUM CHLORIDE 600; 310; 30; 20 MG/100ML; MG/100ML; MG/100ML; MG/100ML
INJECTION, SOLUTION INTRAVENOUS CONTINUOUS
Status: DISCONTINUED | OUTPATIENT
Start: 2017-10-18 | End: 2017-10-18

## 2017-10-18 RX ORDER — LIDOCAINE 40 MG/G
CREAM TOPICAL
Status: DISCONTINUED | OUTPATIENT
Start: 2017-10-18 | End: 2017-10-19 | Stop reason: HOSPADM

## 2017-10-18 RX ORDER — KETOROLAC TROMETHAMINE 15 MG/ML
15 INJECTION, SOLUTION INTRAMUSCULAR; INTRAVENOUS EVERY 6 HOURS PRN
Status: DISCONTINUED | OUTPATIENT
Start: 2017-10-18 | End: 2017-10-19

## 2017-10-18 RX ORDER — MEPERIDINE HYDROCHLORIDE 25 MG/ML
12.5 INJECTION INTRAMUSCULAR; INTRAVENOUS; SUBCUTANEOUS
Status: DISCONTINUED | OUTPATIENT
Start: 2017-10-18 | End: 2017-10-18

## 2017-10-18 RX ORDER — ACETAMINOPHEN 325 MG/1
650 TABLET ORAL EVERY 4 HOURS PRN
Status: DISCONTINUED | OUTPATIENT
Start: 2017-10-18 | End: 2017-10-19 | Stop reason: HOSPADM

## 2017-10-18 RX ORDER — PROPOFOL 10 MG/ML
INJECTION, EMULSION INTRAVENOUS CONTINUOUS PRN
Status: DISCONTINUED | OUTPATIENT
Start: 2017-10-18 | End: 2017-10-18

## 2017-10-18 RX ORDER — HYDROXYZINE PAMOATE 25 MG/1
25 CAPSULE ORAL 3 TIMES DAILY PRN
Status: DISCONTINUED | OUTPATIENT
Start: 2017-10-18 | End: 2017-10-19 | Stop reason: HOSPADM

## 2017-10-18 RX ORDER — ONDANSETRON 2 MG/ML
4 INJECTION INTRAMUSCULAR; INTRAVENOUS EVERY 6 HOURS PRN
Status: DISCONTINUED | OUTPATIENT
Start: 2017-10-18 | End: 2017-10-19 | Stop reason: HOSPADM

## 2017-10-18 RX ORDER — CEPHALEXIN 500 MG/1
500 CAPSULE ORAL 2 TIMES DAILY
Status: DISCONTINUED | OUTPATIENT
Start: 2017-10-18 | End: 2017-10-19 | Stop reason: HOSPADM

## 2017-10-18 RX ORDER — DEXAMETHASONE SODIUM PHOSPHATE 4 MG/ML
INJECTION, SOLUTION INTRA-ARTICULAR; INTRALESIONAL; INTRAMUSCULAR; INTRAVENOUS; SOFT TISSUE PRN
Status: DISCONTINUED | OUTPATIENT
Start: 2017-10-18 | End: 2017-10-18

## 2017-10-18 RX ORDER — SODIUM CHLORIDE, SODIUM LACTATE, POTASSIUM CHLORIDE, CALCIUM CHLORIDE 600; 310; 30; 20 MG/100ML; MG/100ML; MG/100ML; MG/100ML
INJECTION, SOLUTION INTRAVENOUS CONTINUOUS
Status: DISCONTINUED | OUTPATIENT
Start: 2017-10-18 | End: 2017-10-19 | Stop reason: HOSPADM

## 2017-10-18 RX ORDER — HYDROXYZINE HYDROCHLORIDE 50 MG/ML
50 INJECTION, SOLUTION INTRAMUSCULAR EVERY 6 HOURS PRN
Status: DISCONTINUED | OUTPATIENT
Start: 2017-10-18 | End: 2017-10-18

## 2017-10-18 RX ORDER — ONDANSETRON 4 MG/1
4 TABLET, ORALLY DISINTEGRATING ORAL EVERY 6 HOURS PRN
Status: DISCONTINUED | OUTPATIENT
Start: 2017-10-18 | End: 2017-10-19 | Stop reason: HOSPADM

## 2017-10-18 RX ORDER — PHYSOSTIGMINE SALICYLATE 1 MG/ML
1.2 INJECTION INTRAVENOUS
Status: DISCONTINUED | OUTPATIENT
Start: 2017-10-18 | End: 2017-10-18

## 2017-10-18 RX ORDER — PROCHLORPERAZINE 25 MG
12.5 SUPPOSITORY, RECTAL RECTAL EVERY 12 HOURS PRN
Status: DISCONTINUED | OUTPATIENT
Start: 2017-10-18 | End: 2017-10-19 | Stop reason: HOSPADM

## 2017-10-18 RX ORDER — ONDANSETRON 2 MG/ML
INJECTION INTRAMUSCULAR; INTRAVENOUS PRN
Status: DISCONTINUED | OUTPATIENT
Start: 2017-10-18 | End: 2017-10-18

## 2017-10-18 RX ORDER — CEFAZOLIN SODIUM 2 G/100ML
2 INJECTION, SOLUTION INTRAVENOUS
Status: COMPLETED | OUTPATIENT
Start: 2017-10-18 | End: 2017-10-18

## 2017-10-18 RX ORDER — ONDANSETRON 4 MG/1
4 TABLET, ORALLY DISINTEGRATING ORAL EVERY 30 MIN PRN
Status: DISCONTINUED | OUTPATIENT
Start: 2017-10-18 | End: 2017-10-18

## 2017-10-18 RX ORDER — OXYCODONE HYDROCHLORIDE 5 MG/1
5 TABLET ORAL ONCE
Status: COMPLETED | OUTPATIENT
Start: 2017-10-18 | End: 2017-10-18

## 2017-10-18 RX ORDER — CEPHALEXIN 500 MG/1
500 CAPSULE ORAL 3 TIMES DAILY
Qty: 15 CAPSULE | Refills: 0 | Status: SHIPPED | OUTPATIENT
Start: 2017-10-18 | End: 2017-10-23

## 2017-10-18 RX ORDER — PROPOFOL 10 MG/ML
INJECTION, EMULSION INTRAVENOUS PRN
Status: DISCONTINUED | OUTPATIENT
Start: 2017-10-18 | End: 2017-10-18

## 2017-10-18 RX ORDER — HYDROMORPHONE HYDROCHLORIDE 1 MG/ML
.3-.5 INJECTION, SOLUTION INTRAMUSCULAR; INTRAVENOUS; SUBCUTANEOUS EVERY 10 MIN PRN
Status: DISCONTINUED | OUTPATIENT
Start: 2017-10-18 | End: 2017-10-18

## 2017-10-18 RX ORDER — ZOLPIDEM TARTRATE 5 MG/1
5 TABLET ORAL
Status: DISCONTINUED | OUTPATIENT
Start: 2017-10-18 | End: 2017-10-19 | Stop reason: DRUGHIGH

## 2017-10-18 RX ORDER — OXYCODONE HYDROCHLORIDE 5 MG/1
5 TABLET ORAL EVERY 4 HOURS PRN
Qty: 18 TABLET | Refills: 0 | Status: ON HOLD | OUTPATIENT
Start: 2017-10-18 | End: 2017-10-29

## 2017-10-18 RX ORDER — HYDROCODONE BITARTRATE AND ACETAMINOPHEN 7.5; 325 MG/1; MG/1
1 TABLET ORAL EVERY 4 HOURS PRN
Status: DISCONTINUED | OUTPATIENT
Start: 2017-10-18 | End: 2017-10-19 | Stop reason: HOSPADM

## 2017-10-18 RX ORDER — FENTANYL CITRATE 50 UG/ML
25-50 INJECTION, SOLUTION INTRAMUSCULAR; INTRAVENOUS EVERY 5 MIN PRN
Status: DISCONTINUED | OUTPATIENT
Start: 2017-10-18 | End: 2017-10-18

## 2017-10-18 RX ORDER — METOCLOPRAMIDE HYDROCHLORIDE 5 MG/ML
10 INJECTION INTRAMUSCULAR; INTRAVENOUS ONCE
Status: COMPLETED | OUTPATIENT
Start: 2017-10-18 | End: 2017-10-18

## 2017-10-18 RX ORDER — OXYCODONE HYDROCHLORIDE 5 MG/1
5-10 CAPSULE ORAL EVERY 8 HOURS PRN
Status: DISCONTINUED | OUTPATIENT
Start: 2017-10-18 | End: 2017-10-18

## 2017-10-18 RX ORDER — FENTANYL CITRATE 50 UG/ML
INJECTION, SOLUTION INTRAMUSCULAR; INTRAVENOUS PRN
Status: DISCONTINUED | OUTPATIENT
Start: 2017-10-18 | End: 2017-10-18

## 2017-10-18 RX ORDER — NITROGLYCERIN 0.4 MG/1
0.4 TABLET SUBLINGUAL EVERY 5 MIN PRN
Status: DISCONTINUED | OUTPATIENT
Start: 2017-10-18 | End: 2017-10-19 | Stop reason: HOSPADM

## 2017-10-18 RX ORDER — ONDANSETRON 2 MG/ML
4 INJECTION INTRAMUSCULAR; INTRAVENOUS EVERY 30 MIN PRN
Status: DISCONTINUED | OUTPATIENT
Start: 2017-10-18 | End: 2017-10-18

## 2017-10-18 RX ORDER — FENTANYL CITRATE 50 UG/ML
25-50 INJECTION, SOLUTION INTRAMUSCULAR; INTRAVENOUS
Status: DISCONTINUED | OUTPATIENT
Start: 2017-10-18 | End: 2017-10-18

## 2017-10-18 RX ORDER — ALPRAZOLAM 0.5 MG
0.5 TABLET ORAL 3 TIMES DAILY PRN
Status: DISCONTINUED | OUTPATIENT
Start: 2017-10-18 | End: 2017-10-19 | Stop reason: HOSPADM

## 2017-10-18 RX ORDER — NALOXONE HYDROCHLORIDE 0.4 MG/ML
.1-.4 INJECTION, SOLUTION INTRAMUSCULAR; INTRAVENOUS; SUBCUTANEOUS
Status: DISCONTINUED | OUTPATIENT
Start: 2017-10-18 | End: 2017-10-19 | Stop reason: HOSPADM

## 2017-10-18 RX ORDER — NALOXONE HYDROCHLORIDE 0.4 MG/ML
.1-.4 INJECTION, SOLUTION INTRAMUSCULAR; INTRAVENOUS; SUBCUTANEOUS
Status: DISCONTINUED | OUTPATIENT
Start: 2017-10-18 | End: 2017-10-18

## 2017-10-18 RX ORDER — PROCHLORPERAZINE MALEATE 5 MG
5 TABLET ORAL EVERY 6 HOURS PRN
Status: DISCONTINUED | OUTPATIENT
Start: 2017-10-18 | End: 2017-10-19 | Stop reason: HOSPADM

## 2017-10-18 RX ORDER — LIDOCAINE HYDROCHLORIDE 20 MG/ML
INJECTION, SOLUTION INFILTRATION; PERINEURAL PRN
Status: DISCONTINUED | OUTPATIENT
Start: 2017-10-18 | End: 2017-10-18

## 2017-10-18 RX ORDER — ACETAMINOPHEN 650 MG/1
650 SUPPOSITORY RECTAL EVERY 4 HOURS PRN
Status: DISCONTINUED | OUTPATIENT
Start: 2017-10-18 | End: 2017-10-19 | Stop reason: HOSPADM

## 2017-10-18 RX ORDER — ACETAMINOPHEN 10 MG/ML
INJECTION, SOLUTION INTRAVENOUS PRN
Status: DISCONTINUED | OUTPATIENT
Start: 2017-10-18 | End: 2017-10-18

## 2017-10-18 RX ORDER — EPHEDRINE SULFATE 50 MG/ML
INJECTION, SOLUTION INTRAMUSCULAR; INTRAVENOUS; SUBCUTANEOUS PRN
Status: DISCONTINUED | OUTPATIENT
Start: 2017-10-18 | End: 2017-10-18

## 2017-10-18 RX ORDER — SODIUM CHLORIDE, SODIUM LACTATE, POTASSIUM CHLORIDE, CALCIUM CHLORIDE 600; 310; 30; 20 MG/100ML; MG/100ML; MG/100ML; MG/100ML
INJECTION, SOLUTION INTRAVENOUS CONTINUOUS PRN
Status: DISCONTINUED | OUTPATIENT
Start: 2017-10-18 | End: 2017-10-18

## 2017-10-18 RX ADMIN — PHENYLEPHRINE HYDROCHLORIDE 100 MCG: 10 INJECTION INTRAVENOUS at 14:28

## 2017-10-18 RX ADMIN — LIDOCAINE HYDROCHLORIDE 60 MG: 20 INJECTION, SOLUTION INFILTRATION; PERINEURAL at 14:03

## 2017-10-18 RX ADMIN — Medication 7.5 MG: at 14:10

## 2017-10-18 RX ADMIN — FENTANYL CITRATE 50 MCG: 50 INJECTION, SOLUTION INTRAMUSCULAR; INTRAVENOUS at 16:50

## 2017-10-18 RX ADMIN — ONDANSETRON 4 MG: 2 INJECTION INTRAMUSCULAR; INTRAVENOUS at 14:57

## 2017-10-18 RX ADMIN — SODIUM CHLORIDE, POTASSIUM CHLORIDE, SODIUM LACTATE AND CALCIUM CHLORIDE: 600; 310; 30; 20 INJECTION, SOLUTION INTRAVENOUS at 15:38

## 2017-10-18 RX ADMIN — MIDAZOLAM HYDROCHLORIDE 2 MG: 1 INJECTION, SOLUTION INTRAMUSCULAR; INTRAVENOUS at 14:03

## 2017-10-18 RX ADMIN — SODIUM CHLORIDE, POTASSIUM CHLORIDE, SODIUM LACTATE AND CALCIUM CHLORIDE: 600; 310; 30; 20 INJECTION, SOLUTION INTRAVENOUS at 14:01

## 2017-10-18 RX ADMIN — FENTANYL CITRATE 25 MCG: 50 INJECTION, SOLUTION INTRAMUSCULAR; INTRAVENOUS at 15:08

## 2017-10-18 RX ADMIN — CEFAZOLIN SODIUM 2 G: 2 INJECTION, SOLUTION INTRAVENOUS at 14:06

## 2017-10-18 RX ADMIN — KETAMINE HCL-NACL SOLN PREF SY 50 MG/5ML-0.9% (10MG/ML) 10 MG: 10 SOLUTION PREFILLED SYRINGE at 15:18

## 2017-10-18 RX ADMIN — KETOROLAC TROMETHAMINE 15 MG: 15 INJECTION, SOLUTION INTRAMUSCULAR; INTRAVENOUS at 20:42

## 2017-10-18 RX ADMIN — FENTANYL CITRATE 50 MCG: 50 INJECTION, SOLUTION INTRAMUSCULAR; INTRAVENOUS at 17:08

## 2017-10-18 RX ADMIN — FENTANYL CITRATE 50 MCG: 50 INJECTION, SOLUTION INTRAMUSCULAR; INTRAVENOUS at 14:03

## 2017-10-18 RX ADMIN — HYDROMORPHONE HYDROCHLORIDE 0.5 MG: 1 INJECTION, SOLUTION INTRAMUSCULAR; INTRAVENOUS; SUBCUTANEOUS at 17:19

## 2017-10-18 RX ADMIN — PROPOFOL 200 MG: 10 INJECTION, EMULSION INTRAVENOUS at 14:03

## 2017-10-18 RX ADMIN — OXYCODONE HYDROCHLORIDE 5 MG: 5 TABLET ORAL at 17:29

## 2017-10-18 RX ADMIN — HYDROXYZINE PAMOATE 25 MG: 25 CAPSULE ORAL at 22:59

## 2017-10-18 RX ADMIN — FENTANYL CITRATE 50 MCG: 50 INJECTION, SOLUTION INTRAMUSCULAR; INTRAVENOUS at 16:19

## 2017-10-18 RX ADMIN — ONDANSETRON 4 MG: 2 SOLUTION INTRAMUSCULAR; INTRAVENOUS at 16:25

## 2017-10-18 RX ADMIN — ACETAMINOPHEN 1000 MG: 10 INJECTION, SOLUTION INTRAVENOUS at 14:16

## 2017-10-18 RX ADMIN — HYDROMORPHONE HYDROCHLORIDE 0.5 MG: 1 INJECTION, SOLUTION INTRAMUSCULAR; INTRAVENOUS; SUBCUTANEOUS at 17:29

## 2017-10-18 RX ADMIN — HYDROXYZINE HYDROCHLORIDE 50 MG: 50 INJECTION, SOLUTION INTRAMUSCULAR at 17:06

## 2017-10-18 RX ADMIN — PROPOFOL 150 MCG/KG/MIN: 10 INJECTION, EMULSION INTRAVENOUS at 14:03

## 2017-10-18 RX ADMIN — ZOLPIDEM TARTRATE 5 MG: 5 TABLET, FILM COATED ORAL at 20:57

## 2017-10-18 RX ADMIN — CEPHALEXIN 500 MG: 500 CAPSULE ORAL at 20:38

## 2017-10-18 RX ADMIN — METOCLOPRAMIDE 10 MG: 5 INJECTION, SOLUTION INTRAMUSCULAR; INTRAVENOUS at 17:05

## 2017-10-18 RX ADMIN — DEXAMETHASONE SODIUM PHOSPHATE 4 MG: 4 INJECTION, SOLUTION INTRA-ARTICULAR; INTRALESIONAL; INTRAMUSCULAR; INTRAVENOUS; SOFT TISSUE at 14:18

## 2017-10-18 RX ADMIN — KETAMINE HCL-NACL SOLN PREF SY 50 MG/5ML-0.9% (10MG/ML) 10 MG: 10 SOLUTION PREFILLED SYRINGE at 14:06

## 2017-10-18 RX ADMIN — PROPOFOL: 10 INJECTION, EMULSION INTRAVENOUS at 14:37

## 2017-10-18 RX ADMIN — PROPOFOL: 10 INJECTION, EMULSION INTRAVENOUS at 15:22

## 2017-10-18 RX ADMIN — FENTANYL CITRATE 25 MCG: 50 INJECTION, SOLUTION INTRAMUSCULAR; INTRAVENOUS at 14:55

## 2017-10-18 RX ADMIN — HYDROCODONE BITARTRATE AND ACETAMINOPHEN 1 TABLET: 7.5; 325 TABLET ORAL at 22:59

## 2017-10-18 RX ADMIN — ALPRAZOLAM 0.5 MG: 0.5 TABLET ORAL at 20:57

## 2017-10-18 RX ADMIN — PROCHLORPERAZINE EDISYLATE 5 MG: 5 INJECTION INTRAMUSCULAR; INTRAVENOUS at 20:43

## 2017-10-18 RX ADMIN — ONDANSETRON 4 MG: 2 SOLUTION INTRAMUSCULAR; INTRAVENOUS at 19:52

## 2017-10-18 RX ADMIN — FENTANYL CITRATE 50 MCG: 50 INJECTION, SOLUTION INTRAMUSCULAR; INTRAVENOUS at 16:31

## 2017-10-18 RX ADMIN — PHENYLEPHRINE HYDROCHLORIDE 100 MCG: 10 INJECTION INTRAVENOUS at 14:10

## 2017-10-18 RX ADMIN — SODIUM CHLORIDE, POTASSIUM CHLORIDE, SODIUM LACTATE AND CALCIUM CHLORIDE: 600; 310; 30; 20 INJECTION, SOLUTION INTRAVENOUS at 20:53

## 2017-10-18 NOTE — IP AVS SNAPSHOT
[address], [address]    [facility] [address]    Phone:  [phone]                                       After Visit Summary   10/18/2017    Quan Murphy    MRN: [id number]           After Visit Summary Signature Page     I have received my discharge instructions, and my questions have been answered. I have discussed any challenges I see with this plan with the nurse or doctor.    ..........................................................................................................................................  Patient/Patient Representative Signature      ..........................................................................................................................................  Patient Representative Print Name and Relationship to Patient    ..................................................               ................................................  Date                                            Time    ..........................................................................................................................................  Reviewed by Signature/Title    ...................................................              ..............................................  Date                                                            Time

## 2017-10-18 NOTE — OR NURSING
Hospitalist updated- will come down and assess pt- wife at bedside- HR 84 NSR- RR 16- /79- O2 sats 100% w face mask FiO2 15 ltres

## 2017-10-18 NOTE — ANESTHESIA PREPROCEDURE EVALUATION
Anesthesia Evaluation     .             ROS/MED HX    ENT/Pulmonary:       Neurologic:       Cardiovascular:     (+) Dyslipidemia, hypertension--CAD, --stent,1/LAD Drug Eluting Stent .. : . . . :. .       METS/Exercise Tolerance:     Hematologic:         Musculoskeletal:         GI/Hepatic:         Renal/Genitourinary:         Endo:         Psychiatric:     (+) psychiatric history anxiety      Infectious Disease:         Malignancy:   (+) Malignancy History of Prostate          Other:                     Physical Exam  Normal systems: cardiovascular and pulmonary    Airway   Mallampati: II  TM distance: <3 FB  Neck ROM: limited    Dental     Cardiovascular       Pulmonary                     Anesthesia Plan      History & Physical Review  History and physical reviewed and following examination; no interval change.    ASA Status:  2 .    NPO Status:  > 8 hours    Plan for General and LMA with Intravenous and Propofol induction. Maintenance will be TIVA.    PONV prophylaxis:  Ondansetron (or other 5HT-3) and Dexamethasone or Solumedrol  Tylenol,  Ketamine (0.15mg/kg pre-incision and q1H until emergence)      Postoperative Care  Postoperative pain management:  IV analgesics and Oral pain medications.      Consents  Anesthetic plan, risks, benefits and alternatives discussed with:  Patient..                          .  DPreop diagnosis: BILATERLA NEPHROLITHIASIS   Procedure(s):  EXTRACORPOREAL SHOCK WAVE LITHOTRIPSY (ESWL)  Allergies   Allergen Reactions     Animal Dander      Dust Mites      Pollen Extract      Smoke.        No current facility-administered medications on file prior to encounter.   Current Outpatient Prescriptions on File Prior to Encounter:  lisinopril (PRINIVIL/ZESTRIL) 5 MG tablet Take 1 tablet (5 mg) by mouth daily   ALPRAZolam (XANAX) 0.5 MG tablet Take 1 tablet (0.5 mg) by mouth 3 times daily as needed for anxiety   citalopram (CELEXA) 40 MG tablet Take 1 tablet (40 mg) by mouth daily    zolpidem (AMBIEN) 10 MG tablet Take 1 tablet (10 mg) by mouth nightly as needed for sleep   rosuvastatin (CRESTOR) 40 MG tablet Take 1 tablet (40 mg) by mouth daily   cetirizine (ZYRTEC) 10 MG tablet Take 10 mg by mouth daily   oxyCODONE (OXY-IR) 5 MG capsule Take 1-2 capsules (5-10 mg) by mouth every 8 hours as needed for moderate to severe pain (Patient not taking: Reported on 10/9/2017)   clopidogrel (PLAVIX) 75 MG tablet Take 1 tablet (75 mg) by mouth daily (Patient not taking: Reported on 10/16/2017)   aspirin 81 MG EC tablet Take 1 tablet (81 mg) by mouth daily (Patient not taking: Reported on 10/16/2017)   nitroglycerin (NITROSTAT) 0.4 MG sublingual tablet For chest pain place 1 tablet under the tongue every 5 minutes for 3 doses. If symptoms persist 5 minutes after 1st dose call 911.   CIALIS 20 MG tablet Take 20 mg by mouth daily as needed      Hemoglobin   Date Value Ref Range Status   10/16/2017 13.5 13.3 - 17.7 g/dL Final       Potassium   Date Value Ref Range Status   10/07/2017 4.5 3.4 - 5.3 mmol/L Final

## 2017-10-18 NOTE — BRIEF OP NOTE
[facility] Urology Brief Operative Note    Pre-operative diagnosis: BILATERLA NEPHROLITHIASIS    Post-operative diagnosis: Same   Procedure: Procedure(s):  EXTRACORPOREAL SHOCK WAVE LITHOTRIPSY (ESWL)   Surgeon: Meir Torres MD, MD   Assistant(s): None   Anesthesia: LMA   Estimated blood loss: None   Total IV fluids: (See anesthesia record)   Blood transfusion: No transfusion was given during surgery   Total urine output: None   Drains: None   Specimens: None   Implants: None   Findings: See dictation   Complications: None   Condition: Stable   Comments: See dictated operative report for full details    Meir Torres MD

## 2017-10-18 NOTE — DISCHARGE INSTRUCTIONS
You were given a vistaril injection today- do not take your atarax medication until after 12:30 AM [facility]    Same Day Surgery Discharge Instructions for  Sedation and General Anesthesia       It's not unusual to feel dizzy, light-headed or faint for up to 24 hours after surgery or while taking pain medication.  If you have these symptoms: sit for a few minutes before standing and have someone assist you when you get up to walk or use the bathroom.      You should rest and relax for the next 24 hours. We recommend you make arrangements to have an adult stay with you for at least 24 hours after your discharge.  Avoid hazardous and strenuous activity.      DO NOT DRIVE any vehicle or operate mechanical equipment for 24 hours following the end of your surgery.  Even though you may feel normal, your reactions may be affected by the medication you have received.      Do not drink alcoholic beverages for 24 hours following surgery.       Slowly progress to your regular diet as you feel able. It's not unusual to feel nauseated and/or vomit after receiving anesthesia.  If you develop these symptoms, drink clear liquids (apple juice, ginger ale, broth, 7-up, etc. ) until you feel better.  If your nausea and vomiting persists for 24 hours, please notify your surgeon.        All narcotic pain medications, along with inactivity and anesthesia, can cause constipation. Drinking plenty of liquids and increasing fiber intake will help.      For any questions of a medical nature, call your surgeon.      Do not make important decisions for 24 hours.      If you had general anesthesia, you may have a sore throat for a couple of days related to the breathing tube used during surgery.  You may use Cepacol lozenges to help with this discomfort.  If it worsens or if you develop a fever, contact your surgeon.       If you feel your pain is not well managed with the pain medications prescribed by your surgeon, please contact your surgeon's  office to let them know so they can address your concerns.     While you were at the hospital today you were given 1000 mg of Tylenol at 2:10 PM. The recommended daily maximum dose is 4000 mg.     DISCHARGE INSTRUCTIONS FOLLOWING EXTRACORPOREAL SHOCK LITHOTRIPSY (ESWL)      Your stone(s) has been fragmented into many tiny pieces, which must now pass in your urine.  Usually this process is uneventful.  Most fragments pass in the first one or two week, but some may continue to pass for three months or more.  Some pain or discomfort may accompany the passage of these fragments.    To aid in the passage of fragments, drink lots of fluid.  Aim for 1 glass an hour for the next 1 to 2 weeks (2 quarts or more a day).  Most stone patients will benefit from a continued high fluid intake and urine output indefinitely, even after the fragments are gone.  This helps prevent new stone formation.    Strain your urine.  Take the stone fragments to your urologist.  They will have them analyzed to help determine the cause of your stone(s).    You should walk around and resume every day activities.  Activity may help the stone fragments pass.  You should, however, avoid sports or really strenuous exercise for about a week, or at least until there is no more blood in your urine.    You may resume regular diet.    Call your urologist if you have:  a. Persistent severe pain not relieved by oral medications.  b. Fever (over 101 ).  c. Persistent vomiting.    See your urologist as directed.  They will need to take an x-ray to check your progress.  Take your stone fragments to your follow-up appointment.        **If you have questions or concerns about your procedure,   call Dr. Torres at [phone]**

## 2017-10-18 NOTE — OR NURSING
While getting up to recliner- standing up at recliner- suddedenly passed out- Back to bed- Dr Thomson notified, oral airway established- face mask 15 ltres- put on monitor NSR- BP 83/66- shaken, awakens put pale and lethargic-  strong equal bilat- is more awoken now- wife updated- calling Dr Torres for orders to admin for overnight- current HR 80 BP 90/67- RR 15 O2 sats

## 2017-10-18 NOTE — CONSULTS
[facility]    Hospitalist Consultation    Date of Admission:  10/18/2017  Date of Consult (When I saw the patient): 10/18/17    Assessment & Plan   Quan Murphy is a 67 year old male with hx of CAD s/p PCI, dCHF, chronic low back pain, and bilateral nephrolithiasis who was admitted to the Urology service on 10/18/2017 for extracorporeal shock wave lithotripsy. Hospitalist service was consulted for syncope post-op.    Syncope  Lost consciousness while attempting to get up in the PACU. Intra-operatively, he received Fentanyl 100 mg IV x1, Ketamine 20 mg IV x1, and Versed 2 mg IV x1. In the PACU, he received an additional Fentanyl 100 mg IV x2 and Dilaudid 1 mg IV x1 for severe flank pain. Following the syncopal event, he was noted to have a SBP of 70s-80s. He received IV fluids with improvement in BP as well as mentation.   - Syncope is likely related to orthostasis after having received multiple high-dose sedating medications  - Continues on maintenance IV fluids  - Follow orthostatics and monitor on telemetry overnight  - Cardiac source unlikely. TTE in Dec 2016 showed no significant valvular abnormalities. I do not feel this needs to be repeated    Bilateral nephrolithiasis s/p ESWL on 10/18/2017  - Post-op cares as per Urology  - On Keflex BID as per Urology    Chronic low back pain  [PTA: Norco 7.5-325 mg 5x daily, oxycodone 5-10 mg q8h prn]  - Resume PTA Norco tomorrow  - Oxycodone available prn    CAD s/p PCI, native heart, native vessels  Essential hypertension  [PTA: aspirin 81 mg daily, Plavix 75 mg daily, lisinopril 5 mg daily, rosuvastatin 40 mg daily]  - Holding aspirin and Plavix until cleared by Urology  - Holding lisinopril due to post-op hypotension  - Resume rosuvastatin at discharge    Chronic diastolic CHF  - Compensated    Major recurrent depressive disorder with anxiety  [PTA: citalopram 40 mg daily, alprazolam 0.5 mg TID prn]  - Continues on alprazolam prn  - Resume  citalopram at discharge    FEN: Regular diet, LR at 100 ml/h  DVT Prophylaxis: Low Risk/Ambulatory with no VTE prophylaxis indicated  Code Status: Full Code    Disposition: Expected discharge as per Urology, probably tomorrow    Gretta Qing Ramos    Reason for Consult   Reason for consult: I was asked by Dr. Torres to evaluate this patient for syncope.    Primary Care Physician   Jose Hernandez    Chief Complaint   Syncope    History is obtained from the patient, his wife, and review of medical records    History of Present Illness   Quan Murphy is a 67 year old male with hx of CAD s/p PCI, dCHF, chronic low back pain, and bilateral nephrolithiasis who was admitted to the Urology service on 10/18/2017 for extracorporeal shock wave lithotripsy. Hospitalist service was consulted for syncope post-op and to put in admission orders.     The patient underwent uncomplicated ESWL by Dr. Torres earlier today. He was going to be discharged following his procedure, but had a syncopal event while in the PACU. Intra-operatively, he received Fentanyl 100 mg IV x1, Ketamine 20 mg IV x1, and Versed 2 mg IV x1. He was recovering in PACU when he began complaining of 10/10 bilateral flank pain for which he received additional Fentanyl 100 mg IV x2 and Dilaudid 1 mg IV x1. Shortly thereafter, he lost consciousness while attempting to get up to the recliner. Per discussion with the PACU nurse, he was apneic and hypotensive with a blood pressure of 70s-80s systolic. He was initiated on IV fluids with progressive improvement in his mentation as well as his blood pressure to 100s systolic. Currently, he feels somewhat dizzy and lightheaded and continues to have low back pain, but states this is tolerable. He denies chest pain, shortness of breath, nausea, or pain elsewhere. He has been eating and drinking well.    Past Medical History    I have reviewed this patient's medical history and updated the medical record  Past Medical  History:   Diagnosis Date     Allergy, unspecified not elsewhere classified     Seasonal allergies, pollen, dust, smoke and animals     Antiplatelet or antithrombotic long-term use      Anxiety      Arthritis      Chest pain      Chronic sinusitis      Coronary atherosclerosis of unspecified type of vessel, native or graft     Coronary artery disease     Hyperlipidemia      Hypertension      Inguinal hernia      Kidney stones      Malignant neoplasm of prostate (H)     Prostate cancer     Prostate cancer (H)        Past Surgical History   I have reviewed this patient's surgical history and updated the medical record  Past Surgical History:   Procedure Laterality Date     ARTHRODESIS FOOT  7/23/2013    Procedure: ARTHRODESIS FOOT;  Great Toe Arthrodesis Left Foot;  Surgeon: Ash Gonzalez DPM;  Location: [facility] OR     ARTHRODESIS FOOT  6/10/2014    Procedure: ARTHRODESIS FOOT;  Surgeon: Ash Gonzalez DPM;  Location: [facility] OR     C LAPAROSCOPY, SURGICAL PROSTATECTOMY, RETROPUBIC RADICAL, W/NERVE SPARING  11/30/2004    With full bilateral pelvic lymphadenectomy.  [facility].     C TOTAL KNEE ARTHROPLASTY  05/01/08    Left knee     COLONOSCOPY  10/7/2013    Procedure: COLONOSCOPY;  Colonoscopy;  Surgeon: Mike Fallon MD;  Location: [facility] GI     HC CORRECT BUNION,SIMPLE  08/11/2005    x3     HC REMV TOE BENIGN BONE LESN  08/11/2005     HERNIORRHAPHY INGUINAL  7/3/2013    Procedure: HERNIORRHAPHY INGUINAL;  Open Repair Inguinal hernia Right with mesh ;  Surgeon: Sam Escobar MD;  Location: [facility] OR     MOHS MICROGRAPHIC PROCEDURE  08/23/11    ear and chin-[facility]     OPEN REDUCTION INTERNAL FIXATION WRIST Right 7/18/2017    Procedure: OPEN REDUCTION INTERNAL FIXATION WRIST;  Right distal radius open reduction and internal fixation;  Surgeon: Pedro Blanca DO;  Location: [facility] OR     RECONSTRUCT FOREFOOT WITH METATARSOPHALANGEAL (MTP) FUSION  6/10/2014    Procedure: RECONSTRUCT FOREFOOT WITH  METATARSOPHALANGEAL (MTP) FUSION;  Surgeon: Ash Gonzalez DPM;  Location: [facility] OR     STENT, CORONARY, DEMI       SURGICAL HISTORY OF -   1999/1974    lt knee     SURGICAL HISTORY OF -   10/2004    lithotripsy     SURGICAL HISTORY OF -   11/05    angiogram with stent       Prior to Admission Medications   Prior to Admission Medications   Prescriptions Last Dose Informant Patient Reported? Taking?   ALPRAZolam (XANAX) 0.5 MG tablet 10/17/2017 at pm  No Yes   Sig: Take 1 tablet (0.5 mg) by mouth 3 times daily as needed for anxiety   CIALIS 20 MG tablet Unknown at Unknown time Self Yes No   Sig: Take 20 mg by mouth daily as needed    HYDROcodone-acetaminophen (NORCO) 7.5-325 MG per tablet 10/17/2017 at pm  No Yes   Sig: Take 1 tablet by mouth 5 times daily   aspirin 81 MG EC tablet 10/15/2017  No No   Sig: Take 1 tablet (81 mg) by mouth daily   Patient not taking: Reported on 10/16/2017   cetirizine (ZYRTEC) 10 MG tablet 10/18/2017 at am Self Yes Yes   Sig: Take 10 mg by mouth daily   citalopram (CELEXA) 40 MG tablet 10/17/2017 at pm  No Yes   Sig: Take 1 tablet (40 mg) by mouth daily   clopidogrel (PLAVIX) 75 MG tablet 10/12/2017  No No   Sig: Take 1 tablet (75 mg) by mouth daily   Patient not taking: Reported on 10/16/2017   hydrOXYzine (VISTARIL) 25 MG capsule 10/17/2017 at pm  No Yes   Sig: Take 1 capsule (25 mg) by mouth 3 times daily as needed for anxiety   lisinopril (PRINIVIL/ZESTRIL) 5 MG tablet 10/17/2017 at am  No Yes   Sig: Take 1 tablet (5 mg) by mouth daily   nitroglycerin (NITROSTAT) 0.4 MG sublingual tablet More than a month at Unknown time  No No   Sig: For chest pain place 1 tablet under the tongue every 5 minutes for 3 doses. If symptoms persist 5 minutes after 1st dose call 911.   oxyCODONE (OXY-IR) 5 MG capsule More than a month at Unknown time  No No   Sig: Take 1-2 capsules (5-10 mg) by mouth every 8 hours as needed for moderate to severe pain   Patient not taking: Reported on 10/9/2017    rosuvastatin (CRESTOR) 40 MG tablet 10/17/2017 at pm  No Yes   Sig: Take 1 tablet (40 mg) by mouth daily   zolpidem (AMBIEN) 10 MG tablet 10/17/2017 at pm  No Yes   Sig: Take 1 tablet (10 mg) by mouth nightly as needed for sleep      Facility-Administered Medications: None     Allergies   Allergies   Allergen Reactions     Animal Dander      Dust Mites      Pollen Extract      Smoke.        Social History   I have reviewed this patient's social history  He denies history of smoking. He drinks between 2-3 alcoholic beverages per week. He is  and lives with his wife in [place].    Family History   I have reviewed this patient's family history  Family History   Problem Relation Age of Onset     Hypertension Father      has had MI      Connective Tissue Disorder Mother      LUPUS     HEART DISEASE Mother      poor valve-needing replacement       Review of Systems   The 10 point Review of Systems is negative other than noted in the HPI    Physical Exam   Temp: 96.9  F (36.1  C) Temp src: Temporal BP: 103/76   Heart Rate: 80 Resp: 9 SpO2: 100 % O2 Device: Nasal cannula Oxygen Delivery: 4 LPM  Vital Signs with Ranges  Temp:  [96.1  F (35.6  C)-97.3  F (36.3  C)] 96.9  F (36.1  C)  Heart Rate:  [67-86] 80  Resp:  [6-28] 9  BP: ()/() 103/76  SpO2:  [83 %-100 %] 100 %  197 lbs 6 oz    Constitutional: Appears comfortable, NAD  HEENT: Sclera white, conjunctiva clear, EOMI, MMM  Respiratory: Breathing non-labored. Lungs CTAB - no wheezes/crackles/rhonchi  Cardiovascular: Heart RRR, no m/r/g. No pedal edema.   GI: +BS. Abd soft/NT  Lymph/Hematologic: No cervical LAD  Skin: Warm and dry. No rash.  Musculoskeletal: Normal muscle bulk and tone  Neurologic: Alert and appropriate. MURO  Psychiatric: Normal affect    Data   -Data reviewed today: All pertinent laboratory and imaging results from this encounter were reviewed. I personally reviewed no images or EKG's today.    Recent Labs  Lab 10/16/17  1631   WBC  4.8   HGB 13.5   MCV 90          No results found for this or any previous visit (from the past 24 hour(s)).

## 2017-10-18 NOTE — IP AVS SNAPSHOT
MRN:[id number]                      After Visit Summary   10/18/2017    Quan Murphy    MRN: [id number]           Thank you!     Thank you for choosing [facility] for your care. Our goal is always to provide you with excellent care. Hearing back from our patients is one way we can continue to improve our services. Please take a few minutes to complete the written survey that you may receive in the mail after you visit with us. Thank you!        Patient Information     Date Of Birth          1950        About your hospital stay     You were admitted on:  October 18, 2017 You last received care in the:  [place] [facility] [address] [facility]    You were discharged on:  October 19, 2017        Reason for your hospital stay       Kidney stone                  Who to Call     For medical emergencies, please call 911.  For non-urgent questions about your medical care, please call your primary care provider or clinic, [phone]  For questions related to your surgery, please call your surgery clinic        Attending Provider     Provider Specialty    Meir Torres MD Urology    Gretta Ramos MD Internal Medicine       Primary Care Provider Office Phone # Fax #    Jose Hernandez [phone]      After Care Instructions     Diet       Follow this diet upon discharge: Orders Placed This Encounter      Advance Diet as Tolerated: Regular Diet Adult            Diet Instructions       Resume pre procedure diet            Discharge Instructions       When to start anticoagulant medication- after urine clear for 24 hours            Discharge Instructions       Patient to arrange for follow up appointment in 6 weeks with KUB X-ray            Discharge Instructions       .Tylenol and prescribed pain medications are ok to take right away after leaving the hospital. You may resume your over the counter aspirin, NSAIDS (ibuprofen, naproxen, aleve, advil, motrin, etc.), and vitamins /  dietary supplements in 1-2 weeks once your urine is clear of blood and your kidney function has returned to normal and is OK'd by your primary care provider.            Discharge Instructions       -Continue conservative treatment to help pass your kidney stone fragements: take Flomax, increase oral fluids, take pain medications as needed (aovid NSAIDs as instructed. Tylenol OK)and strain urine to retrieve stone.  If a stone is retrieved, you may bring it to our office for analysis.    However, if you develop a fever or chills with shaking, return to your local Emergency Department.            Encourage fluids       Encourage fluids at home to keep urine clear to light pink                  Follow-up Appointments     Follow Up       Please follow up with your primary care provider (Dr Jose Hernandez) next week to check your CREATININE ( kidney function). You can schedule on Front [facility]k [facility]  [address] [place] [address], [address]  [place]. [address]    You may call [phone] with any questions or concerns or if you need to cancel/reschedule appointment.            Follow Up       Please follow up with Dr. Torres in 1-2 months for a routine postoperative check and follow-up of stones. If you strain stones from your urine, you can call the  of our office to arrange to drop them off and then get a stone analysis. It would be best to drop the stones off 2-3 weeks prior to any follow-up appointment, so that Dr. Torres can go over the stone analysis with you at your follow-up appointment.     [facility]  [address] [address]    You may call [phone] with any questions or concerns or if you need to cancel/reschedule appointment.                  Future tests that were ordered for you     XR  KUB [[id number]]                 Further instructions from your care team       You were given a vistaril injection today- do not take your  atarax medication until after 12:30 AM [facility]    Same Day Surgery Discharge Instructions for  Sedation and General Anesthesia       It's not unusual to feel dizzy, light-headed or faint for up to 24 hours after surgery or while taking pain medication.  If you have these symptoms: sit for a few minutes before standing and have someone assist you when you get up to walk or use the bathroom.      You should rest and relax for the next 24 hours. We recommend you make arrangements to have an adult stay with you for at least 24 hours after your discharge.  Avoid hazardous and strenuous activity.      DO NOT DRIVE any vehicle or operate mechanical equipment for 24 hours following the end of your surgery.  Even though you may feel normal, your reactions may be affected by the medication you have received.      Do not drink alcoholic beverages for 24 hours following surgery.       Slowly progress to your regular diet as you feel able. It's not unusual to feel nauseated and/or vomit after receiving anesthesia.  If you develop these symptoms, drink clear liquids (apple juice, ginger ale, broth, 7-up, etc. ) until you feel better.  If your nausea and vomiting persists for 24 hours, please notify your surgeon.        All narcotic pain medications, along with inactivity and anesthesia, can cause constipation. Drinking plenty of liquids and increasing fiber intake will help.      For any questions of a medical nature, call your surgeon.      Do not make important decisions for 24 hours.      If you had general anesthesia, you may have a sore throat for a couple of days related to the breathing tube used during surgery.  You may use Cepacol lozenges to help with this discomfort.  If it worsens or if you develop a fever, contact your surgeon.       If you feel your pain is not well managed with the pain medications prescribed by your surgeon, please contact your surgeon's office to let them know so they can address your concerns.      While you were at the hospital today you were given 1000 mg of Tylenol at 2:10 PM. The recommended daily maximum dose is 4000 mg.     DISCHARGE INSTRUCTIONS FOLLOWING EXTRACORPOREAL SHOCK LITHOTRIPSY (ESWL)      Your stone(s) has been fragmented into many tiny pieces, which must now pass in your urine.  Usually this process is uneventful.  Most fragments pass in the first one or two week, but some may continue to pass for three months or more.  Some pain or discomfort may accompany the passage of these fragments.    To aid in the passage of fragments, drink lots of fluid.  Aim for 1 glass an hour for the next 1 to 2 weeks (2 quarts or more a day).  Most stone patients will benefit from a continued high fluid intake and urine output indefinitely, even after the fragments are gone.  This helps prevent new stone formation.    Strain your urine.  Take the stone fragments to your urologist.  They will have them analyzed to help determine the cause of your stone(s).    You should walk around and resume every day activities.  Activity may help the stone fragments pass.  You should, however, avoid sports or really strenuous exercise for about a week, or at least until there is no more blood in your urine.    You may resume regular diet.    Call your urologist if you have:  a. Persistent severe pain not relieved by oral medications.  b. Fever (over 101 ).  c. Persistent vomiting.    See your urologist as directed.  They will need to take an x-ray to check your progress.  Take your stone fragments to your follow-up appointment.        **If you have questions or concerns about your procedure,   call Dr. Torres at [phone]**            Pending Results     No orders found for last 3 day(s).            Statement of Approval     Ordered          10/19/17 1205  I have reviewed and agree with all the recommendations and orders detailed in this document.  EFFECTIVE NOW     Approved and electronically signed by:  Alistair  "Sridhar Bazzi, DO             Admission Information     Date & Time Provider Department Dept. Phone    10/18/2017 Gretta Ramos MD [place] [address] [facility] [phone]      Your Vitals Were     Blood Pressure Pulse Temperature Respirations Height Weight    147/80 (BP Location: Right arm) 99 95.8  F (35.4  C) (Axillary) 16 1.727 m (5' 8\") 89.5 kg (197 lb 6 oz)    Pulse Oximetry BMI (Body Mass Index)                98% 30.01 kg/m2          MyChart Information     [employer] gives you secure access to your electronic health record. If you see a primary care provider, you can also send messages to your care team and make appointments. If you have questions, please call your primary care clinic.  If you do not have a primary care provider, please call [phone] and they will assist you.        Care EveryWhere ID     This is your Care EveryWhere ID. This could be used by other organizations to access your [facility] medical records  [id number]        Equal Access to Services     MAGGIE HARRIS AH: Hadii linda arvizuo [place], waaxda luqadaha, qaybta kaalmada adeegyada, mitchell hooper . So [facility] [phone].    ATENCIÓN: Si habla español, tiene a recinos disposición servicios gratuitos de asistencia lingüística. Llame al [phone].    We comply with applicable federal civil rights laws and [place] laws. We do not discriminate on the basis of race, color, national origin, age, disability, sex, sexual orientation, or gender identity.               Review of your medicines      START taking        Dose / Directions    cephALEXin 500 MG capsule   Commonly known as:  KEFLEX   Used for:  Renal stones        Dose:  500 mg   Take 1 capsule (500 mg) by mouth 3 times daily for 5 days   Quantity:  15 capsule   Refills:  0       oxyCODONE 5 MG IR tablet   Commonly known as:  ROXICODONE   Used for:  Renal stones   Replaces:  oxyCODONE 5 MG capsule        Dose:  5 mg   Take 1 tablet (5 mg) by " mouth every 4 hours as needed for pain maximum 6 tablet(s) per day   Quantity:  18 tablet   Refills:  0         CONTINUE these medicines which may have CHANGED, or have new prescriptions. If we are uncertain of the size of tablets/capsules you have at home, strength may be listed as something that might have changed.        Dose / Directions    HYDROcodone-acetaminophen 7.5-325 MG per tablet   Commonly known as:  NORCO   This may have changed:    - when to take this  - reasons to take this  - additional instructions   Used for:  Arthropathy        Dose:  1 tablet   Take 1 tablet by mouth 5 times daily   Quantity:  150 tablet   Refills:  0         CONTINUE these medicines which have NOT CHANGED        Dose / Directions    ALPRAZolam 0.5 MG tablet   Commonly known as:  XANAX   Used for:  Anxiety        Dose:  0.5 mg   Take 1 tablet (0.5 mg) by mouth 3 times daily as needed for anxiety   Quantity:  90 tablet   Refills:  1       aspirin 81 MG EC tablet   Used for:  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)        Dose:  81 mg   Take 1 tablet (81 mg) by mouth daily   Quantity:  90 tablet   Refills:  3       cetirizine 10 MG tablet   Commonly known as:  zyrTEC        Dose:  10 mg   Take 10 mg by mouth daily   Refills:  0       CIALIS 20 MG tablet   Generic drug:  tadalafil        Dose:  20 mg   Take 20 mg by mouth daily as needed   Refills:  0       citalopram 40 MG tablet   Commonly known as:  celeXA   Used for:  Anxiety        Dose:  40 mg   Take 1 tablet (40 mg) by mouth daily   Quantity:  30 tablet   Refills:  6       clopidogrel 75 MG tablet   Commonly known as:  PLAVIX   Used for:  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)        Dose:  75 mg   Take 1 tablet (75 mg) by mouth daily   Quantity:  90 tablet   Refills:  3       hydrOXYzine 25 MG capsule   Commonly known as:  VISTARIL   Used for:   Arthropathy   Notes to Patient:  Do not take your atrax until after 12:30 AM        Dose:  25 mg   Take 1 capsule (25 mg) by mouth 3 times daily as needed for anxiety   Quantity:  60 capsule   Refills:  0       lisinopril 5 MG tablet   Commonly known as:  PRINIVIL/ZESTRIL   Used for:  Hypertension goal BP (blood pressure) < 140/90        Dose:  5 mg   Take 1 tablet (5 mg) by mouth daily   Quantity:  90 tablet   Refills:  3       nitroGLYcerin 0.4 MG sublingual tablet   Commonly known as:  NITROSTAT   Used for:  Atherosclerosis of native coronary artery of native heart with unstable angina pectoris (H)        For chest pain place 1 tablet under the tongue every 5 minutes for 3 doses. If symptoms persist 5 minutes after 1st dose call 911.   Quantity:  25 tablet   Refills:  0       rosuvastatin 40 MG tablet   Commonly known as:  CRESTOR   Used for:  Hyperlipidemia LDL goal <130        Dose:  40 mg   Take 1 tablet (40 mg) by mouth daily   Quantity:  90 tablet   Refills:  3       zolpidem 10 MG tablet   Commonly known as:  AMBIEN   Used for:  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, insomnia type        Dose:  10 mg   Take 1 tablet (10 mg) by mouth nightly as needed for sleep   Quantity:  30 tablet   Refills:  5         STOP taking     oxyCODONE 5 MG capsule   Commonly known as:  OXY-IR   Replaced by:  oxyCODONE 5 MG IR tablet                Where to get your medicines      Some of these will need a paper prescription and others can be bought over the counter. Ask your nurse if you have questions.     Bring a paper prescription for each of these medications     cephALEXin 500 MG capsule    oxyCODONE 5 MG IR tablet               ANTIBIOTIC INSTRUCTION     You've Been Prescribed an Antibiotic - Now What?  Your healthcare team thinks that you or your loved one might have an infection. Some infections can be treated with antibiotics, which are powerful, life-saving drugs. Like all medications, antibiotics have side  effects and should only be used when necessary. There are some important things you should know about your antibiotic treatment.      Your healthcare team may run tests before you start taking an antibiotic.    Your team may take samples (e.g., from your blood, urine or other areas) to run tests to look for bacteria. These test can be important to determine if you need an antibiotic at all and, if you do, which antibiotic will work best.      Within a few days, your healthcare team might change or even stop your antibiotic.    Your team may start you on an antibiotic while they are working to find out what is making you sick.    Your team might change your antibiotic because test results show that a different antibiotic would be better to treat your infection.    In some cases, once your team has more information, they learn that you do not need an antibiotic at all. They may find out that you don't have an infection, or that the antibiotic you're taking won't work against your infection. For example, an infection caused by a virus can't be treated with antibiotics. Staying on an antibiotic when you don't need it is more likely to be harmful than helpful.      You may experience side effects from your antibiotic.    Like all medications, antibiotics have side effects. Some of these can be serious.    Let you healthcare team know if you have any known allergies when you are admitted to the hospital.    One significant side effect of nearly all antibiotics is the risk of severe and sometimes deadly diarrhea caused by Clostridium difficile (C. Difficile). This occurs when a person takes antibiotics because some good germs are destroyed. Antibiotic use allows C. diificile to take over, putting patients at high risk for this serious infection.    As a patient or caregiver, it is important to understand your or your loved one's antibiotic treatment. It is especially important for caregivers to speak up when patients can't  speak for themselves. Here are some important questions to ask your healthcare team.    What infection is this antibiotic treating and how do you know I have that infection?    What side effects might occur from this antibiotic?    How long will I need to take this antibiotic?    Is it safe to take this antibiotic with other medications or supplements (e.g., vitamins) that I am taking?     Are there any special directions I need to know about taking this antibiotic? For example, should I take it with food?    How will I be monitored to know whether my infection is responding to the antibiotic?    What tests may help to make sure the right antibiotic is prescribed for me?      Information provided by:  www.cdc.gov/getsmart  U.S. Department of Health and Human Services  Centers for disease Control and Prevention  National Center for Emerging and Zoonotic Infectious Diseases  Division of Healthcare Quality Promotion         Protect others around you: Learn how to safely use, store and throw away your medicines at www.disposemymeds.org.             Medication List: This is a list of all your medications and when to take them. Check marks below indicate your daily home schedule. Keep this list as a reference.      Medications           Morning Afternoon Evening Bedtime As Needed    ALPRAZolam 0.5 MG tablet   Commonly known as:  XANAX   Take 1 tablet (0.5 mg) by mouth 3 times daily as needed for anxiety   Last time this was given:  0.5 mg on 10/19/2017 11:45 AM   Next Dose Due:  10/19 pm, one dose left today                                   aspirin 81 MG EC tablet   Take 1 tablet (81 mg) by mouth daily   Next Dose Due:  Resume per urology instructions                                cephALEXin 500 MG capsule   Commonly known as:  KEFLEX   Take 1 capsule (500 mg) by mouth 3 times daily for 5 days   Last time this was given:  500 mg on 10/19/2017  8:00 AM   Next Dose Due:  10/19 afternoon                                          cetirizine 10 MG tablet   Commonly known as:  zyrTEC   Take 10 mg by mouth daily   Last time this was given:  5 mg on 10/19/2017 11:45 AM   Next Dose Due:  10/20 am                                   CIALIS 20 MG tablet   Take 20 mg by mouth daily as needed   Generic drug:  tadalafil   Next Dose Due:  As needed                                   citalopram 40 MG tablet   Commonly known as:  celeXA   Take 1 tablet (40 mg) by mouth daily   Next Dose Due:  Resume home schedule                                clopidogrel 75 MG tablet   Commonly known as:  PLAVIX   Take 1 tablet (75 mg) by mouth daily   Next Dose Due:  Resume home schedule                                HYDROcodone-acetaminophen 7.5-325 MG per tablet   Commonly known as:  NORCO   Take 1 tablet by mouth 5 times daily   Last time this was given:  1 tablet on 10/19/2017 11:45 AM   Next Dose Due:  After 10/19 3:45pm                                   hydrOXYzine 25 MG capsule   Commonly known as:  VISTARIL   Take 1 capsule (25 mg) by mouth 3 times daily as needed for anxiety   Last time this was given:  25 mg on 10/18/2017 10:59 PM   Notes to Patient:  Do not take your atrax until after 12:30 AM                                   lisinopril 5 MG tablet   Commonly known as:  PRINIVIL/ZESTRIL   Take 1 tablet (5 mg) by mouth daily   Next Dose Due:  Resume home schedule                                nitroGLYcerin 0.4 MG sublingual tablet   Commonly known as:  NITROSTAT   For chest pain place 1 tablet under the tongue every 5 minutes for 3 doses. If symptoms persist 5 minutes after 1st dose call 911.   Next Dose Due:  As needed                                   oxyCODONE 5 MG IR tablet   Commonly known as:  ROXICODONE   Take 1 tablet (5 mg) by mouth every 4 hours as needed for pain maximum 6 tablet(s) per day   Last time this was given:  5 mg on 10/18/2017  5:29 PM   Next Dose Due:  dont take norco and oxycodone together- only use one of them.                                     rosuvastatin 40 MG tablet   Commonly known as:  CRESTOR   Take 1 tablet (40 mg) by mouth daily   Next Dose Due:  Resume home schedule                                zolpidem 10 MG tablet   Commonly known as:  AMBIEN   Take 1 tablet (10 mg) by mouth nightly as needed for sleep   Last time this was given:  5 mg on 10/19/2017  3:09 AM   Next Dose Due:  As needed for sleep

## 2017-10-18 NOTE — ANESTHESIA POSTPROCEDURE EVALUATION
Patient: Quan Murphy    Procedure(s):  BILATERAL EXTRACORPOREAL SHOCKWAVE LITHOTRIPSY  - Wound Class: I-Clean    Diagnosis:BILATERLA NEPHROLITHIASIS   Diagnosis Additional Information: No value filed.    Anesthesia Type:  General, LMA    Note:  Anesthesia Post Evaluation    Patient location during evaluation: PACU  Patient participation: Able to fully participate in evaluation  Level of consciousness: awake  Pain management: adequate  Airway patency: patent  Cardiovascular status: acceptable  Respiratory status: acceptable  Hydration status: acceptable  PONV: none     Anesthetic complications: None          Last vitals:  Vitals:    10/18/17 1630 10/18/17 1645 10/18/17 1700   BP: (!) 157/118 (!) 124/95 103/81   Resp: 14 19 12   Temp: 36.1  C (97  F) 36.3  C (97.3  F) 36.3  C (97.3  F)   SpO2: 100% 99% 95%         Electronically Signed By: Mele Solorzano MD  October 18, 2017  5:13 PM

## 2017-10-18 NOTE — ANESTHESIA CARE TRANSFER NOTE
Patient: Quan Murphy    Procedure(s):  BILATERAL EXTRACORPOREAL SHOCKWAVE LITHOTRIPSY  - Wound Class: I-Clean    Diagnosis: BILATERLA NEPHROLITHIASIS   Diagnosis Additional Information: No value filed.    Anesthesia Type:   General, LMA     Note:  Airway :Face Mask  Patient transferred to:PACU  Comments: Pt exhibits spont resps, all monitors and alarms on in pacu, report given to RN, vss.      Vitals: (Last set prior to Anesthesia Care Transfer)    CRNA VITALS  10/18/2017 1527 - 10/18/2017 1605      10/18/2017             Pulse: 65    Ht Rate: 64    SpO2: 99 %    Resp Rate (observed): 17                Electronically Signed By: MARCY Bella CRNA  October 18, 2017  4:05 PM

## 2017-10-18 NOTE — OR NURSING
VS currently NSR 85 BP 95/74- RR- 20- )2 sats 100% w face mask FiO2 15 ltres- Dr Torres updated- orders received to admit to 88- In House hospitalists consult- ASAT- VS Q4 hours- O2 sat monitoring continual- telemetry continual- Hospitalist to address po keflex anti-Bx and pain medication orders - Charge Nurse updated- pt is now more awake- oral airway removed.

## 2017-10-19 ENCOUNTER — TELEPHONE (OUTPATIENT)
Dept: FAMILY MEDICINE | Facility: CLINIC | Age: 67
End: 2017-10-19

## 2017-10-19 VITALS
SYSTOLIC BLOOD PRESSURE: 147 MMHG | BODY MASS INDEX: 29.91 KG/M2 | HEIGHT: 68 IN | TEMPERATURE: 95.8 F | HEART RATE: 99 BPM | DIASTOLIC BLOOD PRESSURE: 80 MMHG | OXYGEN SATURATION: 98 % | WEIGHT: 197.38 LBS | RESPIRATION RATE: 16 BRPM

## 2017-10-19 LAB
BUN SERPL-MCNC: 25 MG/DL (ref 7–30)
CALCIUM SERPL-MCNC: 8.1 MG/DL (ref 8.5–10.1)
CHLORIDE SERPL-SCNC: 104 MMOL/L (ref 94–109)
CO2 SERPL-SCNC: 23 MMOL/L (ref 20–32)
CREAT SERPL-MCNC: 2.51 MG/DL (ref 0.66–1.25)
GFR SERPL CREATININE-BSD FRML MDRD: 26 ML/MIN/1.7M2
GLUCOSE SERPL-MCNC: 118 MG/DL (ref 70–99)
POTASSIUM SERPL-SCNC: 5 MMOL/L (ref 3.4–5.3)
SODIUM SERPL-SCNC: 136 MMOL/L (ref 133–144)

## 2017-10-19 PROCEDURE — A9270 NON-COVERED ITEM OR SERVICE: HCPCS | Mod: GY | Performed by: INTERNAL MEDICINE

## 2017-10-19 PROCEDURE — G0378 HOSPITAL OBSERVATION PER HR: HCPCS

## 2017-10-19 PROCEDURE — 99207 ZZC CDG-CODE CATEGORY CHANGED: CPT | Performed by: INTERNAL MEDICINE

## 2017-10-19 PROCEDURE — 80048 BASIC METABOLIC PNL TOTAL CA: CPT | Performed by: INTERNAL MEDICINE

## 2017-10-19 PROCEDURE — 36415 COLL VENOUS BLD VENIPUNCTURE: CPT | Performed by: INTERNAL MEDICINE

## 2017-10-19 PROCEDURE — 25000125 ZZHC RX 250: Performed by: INTERNAL MEDICINE

## 2017-10-19 PROCEDURE — 25000132 ZZH RX MED GY IP 250 OP 250 PS 637: Mod: GY | Performed by: INTERNAL MEDICINE

## 2017-10-19 PROCEDURE — 25000128 H RX IP 250 OP 636: Performed by: UROLOGY

## 2017-10-19 PROCEDURE — 25000128 H RX IP 250 OP 636: Performed by: INTERNAL MEDICINE

## 2017-10-19 PROCEDURE — 99217 ZZC OBSERVATION CARE DISCHARGE: CPT | Performed by: INTERNAL MEDICINE

## 2017-10-19 PROCEDURE — A9270 NON-COVERED ITEM OR SERVICE: HCPCS | Mod: GY | Performed by: PHYSICIAN ASSISTANT

## 2017-10-19 PROCEDURE — 25000132 ZZH RX MED GY IP 250 OP 250 PS 637: Mod: GY | Performed by: PHYSICIAN ASSISTANT

## 2017-10-19 RX ORDER — CETIRIZINE HYDROCHLORIDE 10 MG/1
10 TABLET ORAL DAILY
Status: DISCONTINUED | OUTPATIENT
Start: 2017-10-20 | End: 2017-10-19 | Stop reason: HOSPADM

## 2017-10-19 RX ORDER — CETIRIZINE HYDROCHLORIDE 5 MG/1
5 TABLET ORAL DAILY
Status: DISCONTINUED | OUTPATIENT
Start: 2017-10-20 | End: 2017-10-19

## 2017-10-19 RX ORDER — CETIRIZINE HYDROCHLORIDE 5 MG/1
5 TABLET ORAL ONCE
Status: COMPLETED | OUTPATIENT
Start: 2017-10-19 | End: 2017-10-19

## 2017-10-19 RX ORDER — CETIRIZINE HYDROCHLORIDE 10 MG/1
10 TABLET ORAL DAILY
Status: DISCONTINUED | OUTPATIENT
Start: 2017-10-19 | End: 2017-10-19 | Stop reason: DRUGHIGH

## 2017-10-19 RX ADMIN — HYDROCODONE BITARTRATE AND ACETAMINOPHEN 1 TABLET: 7.5; 325 TABLET ORAL at 07:41

## 2017-10-19 RX ADMIN — ONDANSETRON 4 MG: 4 TABLET, ORALLY DISINTEGRATING ORAL at 03:01

## 2017-10-19 RX ADMIN — ZOLPIDEM TARTRATE 5 MG: 5 TABLET, FILM COATED ORAL at 03:09

## 2017-10-19 RX ADMIN — CETIRIZINE HYDROCHLORIDE 5 MG: 5 TABLET ORAL at 11:45

## 2017-10-19 RX ADMIN — KETOROLAC TROMETHAMINE 15 MG: 15 INJECTION, SOLUTION INTRAMUSCULAR; INTRAVENOUS at 03:02

## 2017-10-19 RX ADMIN — HYDROCODONE BITARTRATE AND ACETAMINOPHEN 1 TABLET: 7.5; 325 TABLET ORAL at 11:45

## 2017-10-19 RX ADMIN — ALPRAZOLAM 0.5 MG: 0.5 TABLET ORAL at 05:55

## 2017-10-19 RX ADMIN — CEPHALEXIN 500 MG: 500 CAPSULE ORAL at 08:00

## 2017-10-19 RX ADMIN — SODIUM CHLORIDE, POTASSIUM CHLORIDE, SODIUM LACTATE AND CALCIUM CHLORIDE: 600; 310; 30; 20 INJECTION, SOLUTION INTRAVENOUS at 06:37

## 2017-10-19 RX ADMIN — ALPRAZOLAM 0.5 MG: 0.5 TABLET ORAL at 11:45

## 2017-10-19 RX ADMIN — HYDROCODONE BITARTRATE AND ACETAMINOPHEN 1 TABLET: 7.5; 325 TABLET ORAL at 03:24

## 2017-10-19 RX ADMIN — CETIRIZINE HYDROCHLORIDE 10 MG: 10 TABLET, FILM COATED ORAL at 08:00

## 2017-10-19 NOTE — PLAN OF CARE
Problem: Patient Care Overview  Goal: Plan of Care/Patient Progress Review  Outcome: Adequate for Discharge Date Met:  10/19/17  VSS. RA. A&Ox4. Pain managed with norco, anxiety managed with xanax. Tele: NSR. Voiding adequately, PVRs improving 288, 106. Bloody urine (to be exepcted). Tolerating diet. Up SBA, no lightheadedness. D/c instructions and meds given to pt. D/c home with wife once she gets here.

## 2017-10-19 NOTE — PROVIDER NOTIFICATION
MD Notification    Notified Person:  Urology PA    Notified Persons Name: Ayse    Notification Date/Time: 10/19/17 0945    Notification Interaction:  Talked with Physician    Purpose of Notification: pt's allergies very bad. Zyrtec 10 mg this morning, not working well enough, requesting additional dose today.     Orders Received: additional one time dose of 5 mg of zyrtec.     Comments:

## 2017-10-19 NOTE — TELEPHONE ENCOUNTER
Patient scheduled a appointment through [facility] for 11/1/17 and he had a question about if he needed labs.  Please call him to let him know if he does or not.     Primary Care  This is a follow up visit requested by Dr. Torres  post operative, to make sure creatinine ( kidney  function) is back to normal.   Apparently was a bit  off after lithrotripsy done at [facility]. Will this require  a lab a day or two before the appointment?  [facility]

## 2017-10-19 NOTE — OP NOTE
DATE OF PROCEDURE:  10/18/2017      PROCEDURE:  Bilateral extracorporeal shock wave lithotripsy.      PREOPERATIVE DIAGNOSIS:  Bilateral renal stones.      POSTOPERATIVE DIAGNOSIS:  Bilateral renal stones.      OPERATIVE NOTE:  Informed consent was obtained from Quan Murphy.  He was brought to the operating room, given laryngeal mask anesthesia in the supine position on the lithotripsy table.  Right-sided stones were treated first.  Five stones treated with a total of 3000 shocks with excellent fragmentation seen.  Three visible stones were treated on the left side until fragmentation occurred.  The procedure was terminated at that point.  He tolerated the procedure well, no complications, was taken to recovery room in stable condition.      He will be discharged home from the recovery room with Keflex t.i.d. x5 days and oxycodone p.r.n. for pain.  He can restart his anticoagulants once his urine has been clear for 24 hours.  This will likely be in 4-5 days.  He should return to clinic in 6 weeks' time with a KUB x-ray.         MEIR GLYNN MD             D: 10/18/2017 15:40   T: 10/18/2017 22:48   MT: #[id number]      Name:     QUAN MURPHY   MRN:      [id number]        Account:        [id number]   :      1950           Procedure Date: 10/18/2017      Document: [id number]       cc: Meir Glynn MD

## 2017-10-19 NOTE — PLAN OF CARE
Problem: Patient Care Overview  Goal: Plan of Care/Patient Progress Review  Pt is alert and oriented X4, BP soft, other VSS.  Pt complained of 6/10 flank pain, Toradol given X1, Norco given X1 with some relief.  Pt nauseated nauseated zofran and compazine given X1 with relief.  PIV infusing 100ml/hr.  Pt ambulated in [place] with assist of 1.  Will continue to monitor.

## 2017-10-19 NOTE — PROGRESS NOTES
"Urology POD #1  S/P Bilateral extracorporeal shock wave lithotripsy    Subjective:  Pt found standing at bedside. Pt reports he voided this AM and had some discomfort. He's also having some flank pain, but this is normal for him following ESWL- he had a previous ESWL years ago and it was the same afterwards for him. Pt feels well overall, is very anxious to go home. Wants to leave ASAP. Pt not feeling faint, is ambulating around fine. Pt denies nausea, vomiting, difficulty eating. Pt having some rhinorrhea, he suspects due to allergies in the hospital.     Objective:  /71 (BP Location: Right arm)  Pulse 99  Temp 96  F (35.6  C) (Oral)  Resp 16  Ht 1.727 m (5' 8\")  Wt 89.5 kg (197 lb 6 oz)  SpO2 98%  BMI 30.01 kg/m2    Intake/Output Summary (Last 24 hours) at 10/19/17 0952  Last data filed at 10/19/17 0934   Gross per 24 hour   Intake             4451 ml   Output              200 ml   Net             4251 ml     Labs:  ROUTINE IP LABS (Last four results)  BMP  Recent Labs  Lab 10/19/17  0720 10/18/17  2004   CR 2.51* 1.67*     CBC  Recent Labs  Lab 10/16/17  1631   WBC 4.8   RBC 4.67   HGB 13.5   HCT 42.0   MCV 90   MCH 28.9   MCHC 32.1   RDW 14.4        Exam:  Gen: Alert and oriented, cooperative, NAD  Ab: soft, rounded, NTTP  : NO Catheter in place  Ext: Calves soft, nttp, no edema, no PCDs    Assessment/Plan:  S/P Bilateral extracorporeal shock wave lithotripsy with post-op syncopal episode   -Voiding ok, PVRs continue to improve. Expect blood in the urine. Push fluids    -Avoid nephrotoxic meds, encourage fluids, check Cr with PCP in 1 week   -Pt ok to D/C from a urologic perspective once cleared by hospitalist    Discussed exam, lab work and plan with Dr. Torres  Please contact me with any questions or concerns.     Ayse Mills PA-C  [facility]  Pager:  [phone]  After 4pm and on weekends, please call [phone]    "

## 2017-10-19 NOTE — DISCHARGE SUMMARY
[facility]    Discharge Summary  Hospitalist    Date of Admission:  10/18/2017  Date of Discharge:  10/19/2017  1:57 PM  Discharging Provider: Sridhar Sainz,     Discharge Diagnoses   1. Bilateral nephrolithiasis  2. Syncope, suspect orthostatic  3. Chronic diastolic CHF, not decompensatedf    History of Present Illness   Quan Murphy is a 67 year old male with hx of CAD s/p PCI, dCHF, chronic low back pain, and bilateral nephrolithiasis who was admitted to the Urology service on 10/18/2017 for extracorporeal shock wave lithotripsy. Hospitalist service was consulted for syncope post-op     The patient underwent uncomplicated ESWL by Dr. Torres earlier today. He was going to be discharged following his procedure, but had a syncopal event while in the PACU. Intra-operatively, he received Fentanyl 100 mg IV x1, Ketamine 20 mg IV x1, and Versed 2 mg IV x1. He was recovering in PACU when he began complaining of 10/10 bilateral flank pain for which he received additional Fentanyl 100 mg IV x2 and Dilaudid 1 mg IV x1. Shortly thereafter, he lost consciousness while attempting to get up to the recliner. Per discussion with the PACU nurse, he was apneic and hypotensive with a blood pressure of 70s-80s systolic. He was initiated on IV fluids with progressive improvement in his mentation as well as his blood pressure to 100s systolic. Currently, he feels somewhat dizzy and lightheaded and continues to have low back pain, but states this is tolerable. He denies chest pain, shortness of breath, nausea, or pain elsewhere. He has been eating and drinking well.    Hospital Course   Quan Murphy was admitted on 10/18/2017.  The following problems were addressed during his hospitalization:    Patient initially underwent a ESWL for bilateral nephrolithiasis.  Post-op he had a syncopal episode upon standing.  It was felt to be orthostatic and related to the sedating medications he received.  He was given  IVF and had no further issues.  Patient was then discharged in stable condition.    Sridhar Sainz, DO    Significant Results and Procedures   See below    Pending Results   No pending results  Unresulted Labs Ordered in the Past 30 Days of this Admission     No orders found for last 61 day(s).          Code Status   Full Code       Primary Care Physician   Jose Hernandez    Physical Exam   Temp: 95.8  F (35.4  C) Temp src: Axillary BP: 147/80 Pulse: 99 Heart Rate: 80 Resp: 16 SpO2: 98 % O2 Device: None (Room air) Oxygen Delivery: 1 LPM  Vitals:    10/18/17 1228   Weight: 89.5 kg (197 lb 6 oz)     Vital Signs with Ranges  Temp:  [95.3  F (35.2  C)-97.4  F (36.3  C)] 95.8  F (35.4  C)  Pulse:  [92-99] 99  Heart Rate:  [67-86] 80  Resp:  [6-28] 16  BP: ()/() 147/80  SpO2:  [83 %-100 %] 98 %  I/O last 3 completed shifts:  In: 4091 [P.O.:300; I.V.:3791]  Out: 0     Constitutional: Resting comfortably in bed NAD  Eyes: EOMI  Respiratory: Clear to auscultation.  No respiratory distress  Cardiovascular: RRR. No murmurs  GI: bowel sounds present.  No tenderness    Discharge Disposition   Discharged to home  Condition at discharge: Stable    Consultations This Hospital Stay   HOSPITALIST IP CONSULT    Time Spent on this Encounter   I, Sridhar Sainz, personally saw the patient today and spent less than or equal to 30 minutes discharging this patient.    Discharge Orders     XR  KUB [[id number]]     Discharge Instructions   When to start anticoagulant medication- after urine clear for 24 hours     Diet Instructions   Resume pre procedure diet     Encourage fluids   Encourage fluids at home to keep urine clear to light pink     Discharge Instructions   Patient to arrange for follow up appointment in 6 weeks with KUB X-ray     Follow Up   Please follow up with your primary care provider next week to check your creatinine (kidney function).     [facility]  [address], [address]  [place]. [address]    You may call [phone] with any questions or concerns or if you need to cancel/reschedule appointment.     Discharge Instructions   .Tylenol and prescribed pain medications are ok to take right away after leaving the hospital. You may resume your over the counter aspirin, NSAIDS (ibuprofen, naproxen, aleve, advil, motrin, etc.), and vitamins / dietary supplements in 1-2 weeks once your urine is clear of blood and your kidney function has returned to normal and is OK'd by your primary care provider.     Reason for your hospital stay   Kidney stone     Follow-up and recommended labs and tests    Follow up with primary care provider, Jose Hernandez, within 2-4 weeks, for hospital follow- up. The following labs/tests are recommended: renal panel.     Full Code     Diet   Follow this diet upon discharge: Orders Placed This Encounter     Advance Diet as Tolerated: Regular Diet Adult       Discharge Medications   Current Discharge Medication List      START taking these medications    Details   cephALEXin (KEFLEX) 500 MG capsule Take 1 capsule (500 mg) by mouth 3 times daily for 5 days  Qty: 15 capsule, Refills: 0    Associated Diagnoses: Renal stones      oxyCODONE (ROXICODONE) 5 MG IR tablet Take 1 tablet (5 mg) by mouth every 4 hours as needed for pain maximum 6 tablet(s) per day  Qty: 18 tablet, Refills: 0    Associated Diagnoses: Renal stones         CONTINUE these medications which have NOT CHANGED    Details   hydrOXYzine (VISTARIL) 25 MG capsule Take 1 capsule (25 mg) by mouth 3 times daily as needed for anxiety  Qty: 60 capsule, Refills: 0    Associated Diagnoses: Arthropathy      HYDROcodone-acetaminophen (NORCO) 7.5-325 MG per tablet Take 1 tablet by mouth 5 times daily  Qty: 150 tablet, Refills: 0    Associated Diagnoses: Arthropathy      lisinopril (PRINIVIL/ZESTRIL) 5 MG tablet Take 1 tablet (5 mg) by mouth daily  Qty: 90 tablet, Refills: 3    Associated Diagnoses: Hypertension  goal BP (blood pressure) < 140/90      ALPRAZolam (XANAX) 0.5 MG tablet Take 1 tablet (0.5 mg) by mouth 3 times daily as needed for anxiety  Qty: 90 tablet, Refills: 1    Associated Diagnoses: Anxiety      citalopram (CELEXA) 40 MG tablet Take 1 tablet (40 mg) by mouth daily  Qty: 30 tablet, Refills: 6    Associated Diagnoses: Anxiety      zolpidem (AMBIEN) 10 MG tablet Take 1 tablet (10 mg) by mouth nightly as needed for sleep  Qty: 30 tablet, Refills: 5    Associated Diagnoses: 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, insomnia type      rosuvastatin (CRESTOR) 40 MG tablet Take 1 tablet (40 mg) by mouth daily  Qty: 90 tablet, Refills: 3    Associated Diagnoses: Hyperlipidemia LDL goal <130      cetirizine (ZYRTEC) 10 MG tablet Take 10 mg by mouth daily      clopidogrel (PLAVIX) 75 MG tablet Take 1 tablet (75 mg) by mouth daily  Qty: 90 tablet, Refills: 3    Associated Diagnoses: 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 If symptoms persist 5 minutes after 1st dose call 911.  Qty: 25 tablet, Refills: 0    Associated Diagnoses: Atherosclerosis of native coronary artery of native heart with unstable angina pectoris (H)      CIALIS 20 MG tablet Take 20 mg by mouth daily as needed          STOP taking these medications       oxyCODONE (OXY-IR) 5 MG capsule Comments:   Reason for Stopping:             Allergies   Allergies   Allergen Reactions     Animal Dander      Dust Mites      Pollen Extract      Smoke.      Data   Most Recent 3 CBC's:  Recent Labs   Lab Test  10/16/17   1631  07/17/17   0936   07/14/17 1923 12/28/16   0544   WBC  4.8   --   6.0  4.3   HGB  13.5  13.1*  13.8  14.2   MCV  90   --   87  86   PLT  170   --   174  127*      Most Recent 3 BMP's:  Recent Labs   Lab Test  10/19/17   0720  10/18/17   2004  10/07/17   1009  07/17/17   0936  07/14/17 1923 12/28/16   0544   NA  136   --   144   --   141  138   POTASSIUM  5.0   --   4.5  4.3  3.5  4.5   CHLORIDE  104   --   108   --   106  104   CO2  23   --   26   --   22  27   BUN  25   --   13   --   19  14   CR  2.51*  1.67*  1.00  1.24  1.17  1.15   ANIONGAP   --    --   10   --   13  7   LATRELL  8.1*   --   9.1   --   8.6  8.6   GLC  118*   --   97   --   83  78     Most Recent 2 LFT's:  Recent Labs   Lab Test  10/07/17   1009  12/23/16   2130  12/13/16   1359   AST   --   28  31   ALT  32  45  49   ALKPHOS   --   72  76   BILITOTAL   --   1.1  1.1     Most Recent INR's and Anticoagulation Dosing History:  Anticoagulation Dose History     Recent Dosing and Labs Latest Ref Rng & Units 11/17/2005    INR 0.86 - 1.14 0.99        Most Recent 3 Troponin's:  Recent Labs   Lab Test  12/24/16   1020  12/24/16   0325  12/23/16   2130   TROPI  <0.015  The 99th percentile for upper reference range is 0.045 ug/L.  Troponin values in   the range of 0.045 - 0.120 ug/L may be associated with risks of adverse   clinical events.    <0.015  The 99th percentile for upper reference range is 0.045 ug/L.  Troponin values in   the range of 0.045 - 0.120 ug/L may be associated with risks of adverse   clinical events.    <0.015  The 99th percentile for upper reference range is 0.045 ug/L.  Troponin values in   the range of 0.045 - 0.120 ug/L may be associated with risks of adverse   clinical events.       Most Recent Cholesterol Panel:  Recent Labs   Lab Test  10/07/17   1009   CHOL  177   LDL  76   HDL  87   TRIG  68     Most Recent 6 Bacteria Isolates From Any Culture (See EPIC Reports for Culture Details):No lab results found.  Most Recent TSH, T4 and A1c  Labs:  Recent Labs   Lab Test  10/20/11   1003   TSH  4.33     Results for orders placed or performed during the hospital encounter of 10/11/17   CT Abdomen Pelvis w/o Contrast    Narrative    CT ABDOMEN/PELVIS WITHOUT CONTRAST October 11, 2017 1:50 PM     HISTORY: Calculus of kidney.    TECHNIQUE: Axial CT images of the abdomen and pelvis from the  diaphragm to the symphysis pubis were acquired without IV contrast or  oral contrast. Radiation dose for this scan was reduced using  automated exposure control, adjustment of the mA and/or kV according  to patient size, or iterative reconstruction technique.    COMPARISON: None.    FINDINGS: CT dated 4/4/2005.      Impression    IMPRESSION: Multiple bilateral renal calculi are seen. These range in  size from 1mm-7 mm in diameter. On the right there are at least 13  calculi present. On the left there are at least 12 separate calculi.  No hydronephrosis is identified. No hydroureter is seen. No ureteral  calculi are identified.    Scans through the lung bases are negative. The liver is unremarkable.  Gallbladder is unremarkable. Spleen is grossly normal. Pancreas is  unremarkable. No retroperitoneal adenopathy is seen. Vascular  calcifications are noted. A stent is present in the right iliac  artery. Multiple diverticula are seen in the sigmoid colon but no  diverticulitis is identified. Degenerative changes are seen in the  lumbar spine and sacroiliac joints.    IMPRESSION: Multiple small bilateral renal calculi. No hydronephrosis  or hydroureter.    GRAEME ROSE MD     *Note: Due to a large number of results and/or encounters for the requested time period, some results have not been displayed. A complete set of results can be found in Results Review.

## 2017-10-19 NOTE — PLAN OF CARE
Problem: Patient Care Overview  Goal: Plan of Care/Patient Progress Review  Outcome: No Change  Pt is A&Ox4. Soft BP, other VSS. Pt complained of 6/10 flank pain, Toradol given x1, Norco given x1 with some relief. Pt c/o nausea so Zofran given x1 with relief. Xanax given x1 for restlestness. PIV infusing LR 100mL/hr. Pt removed O2 overnight. Will continue to monitor.

## 2017-10-20 DIAGNOSIS — N20.0 CALCULUS OF KIDNEY: Primary | ICD-10-CM

## 2017-10-24 LAB
APPEARANCE STONE: NORMAL
COMPN STONE: NORMAL
NUMBER STONE: NORMAL
SIZE STONE: NORMAL MM
WT STONE: 14 MG

## 2017-10-26 NOTE — TELEPHONE ENCOUNTER
Patient does not appear to need any further lab work done per Dr. Hernandez. Dr. Hernandez will review his visit with Brian at his upcoming visit with David.

## 2017-10-28 ENCOUNTER — HOSPITAL ENCOUNTER (OUTPATIENT)
Facility: CLINIC | Age: 67
Setting detail: OBSERVATION
Discharge: HOME | End: 2017-10-29
Attending: EMERGENCY MEDICINE | Admitting: FAMILY MEDICINE
Payer: MEDICARE

## 2017-10-28 ENCOUNTER — APPOINTMENT (OUTPATIENT)
Dept: CT IMAGING | Facility: CLINIC | Age: 67
End: 2017-10-28
Attending: EMERGENCY MEDICINE
Payer: MEDICARE

## 2017-10-28 DIAGNOSIS — K64.9 HEMORRHOIDS, UNSPECIFIED HEMORRHOID TYPE: ICD-10-CM

## 2017-10-28 DIAGNOSIS — S37.019A KIDNEY HEMATOMA, UNSPECIFIED LATERALITY, INITIAL ENCOUNTER: ICD-10-CM

## 2017-10-28 DIAGNOSIS — K68.3 RETROPERITONEAL HEMATOMA: ICD-10-CM

## 2017-10-28 LAB
ALBUMIN SERPL-MCNC: 3.1 G/DL (ref 3.4–5)
ALBUMIN UR-MCNC: NEGATIVE MG/DL
ALP SERPL-CCNC: 76 U/L (ref 40–150)
ALT SERPL W P-5'-P-CCNC: 22 U/L (ref 0–70)
ANION GAP SERPL CALCULATED.3IONS-SCNC: 9 MMOL/L (ref 3–14)
APPEARANCE UR: CLEAR
APTT PPP: 47 SEC (ref 22–37)
AST SERPL W P-5'-P-CCNC: 31 U/L (ref 0–45)
BASOPHILS # BLD AUTO: 0 10E9/L (ref 0–0.2)
BASOPHILS NFR BLD AUTO: 0.3 %
BILIRUB SERPL-MCNC: 1.7 MG/DL (ref 0.2–1.3)
BILIRUB UR QL STRIP: NEGATIVE
BUN SERPL-MCNC: 15 MG/DL (ref 7–30)
CALCIUM SERPL-MCNC: 9.1 MG/DL (ref 8.5–10.1)
CHLORIDE SERPL-SCNC: 104 MMOL/L (ref 94–109)
CO2 SERPL-SCNC: 24 MMOL/L (ref 20–32)
COLOR UR AUTO: YELLOW
CREAT SERPL-MCNC: 1.15 MG/DL (ref 0.66–1.25)
DIFFERENTIAL METHOD BLD: ABNORMAL
EOSINOPHIL # BLD AUTO: 0 10E9/L (ref 0–0.7)
EOSINOPHIL NFR BLD AUTO: 0.3 %
ERYTHROCYTE [DISTWIDTH] IN BLOOD BY AUTOMATED COUNT: 14.1 % (ref 10–15)
GFR SERPL CREATININE-BSD FRML MDRD: 64 ML/MIN/1.7M2
GLUCOSE SERPL-MCNC: 99 MG/DL (ref 70–99)
GLUCOSE UR STRIP-MCNC: NEGATIVE MG/DL
HCT VFR BLD AUTO: 29.1 % (ref 40–53)
HEMOCCULT STL QL: NEGATIVE
HGB BLD-MCNC: 8.5 G/DL (ref 13.3–17.7)
HGB BLD-MCNC: 9.3 G/DL (ref 13.3–17.7)
HGB UR QL STRIP: ABNORMAL
IMM GRANULOCYTES # BLD: 0 10E9/L (ref 0–0.4)
IMM GRANULOCYTES NFR BLD: 0.3 %
INR PPP: 1.08 (ref 0.86–1.14)
KETONES UR STRIP-MCNC: NEGATIVE MG/DL
LEUKOCYTE ESTERASE UR QL STRIP: NEGATIVE
LYMPHOCYTES # BLD AUTO: 0.9 10E9/L (ref 0.8–5.3)
LYMPHOCYTES NFR BLD AUTO: 13.6 %
MCH RBC QN AUTO: 28.3 PG (ref 26.5–33)
MCHC RBC AUTO-ENTMCNC: 32 G/DL (ref 31.5–36.5)
MCV RBC AUTO: 88 FL (ref 78–100)
MONOCYTES # BLD AUTO: 0.7 10E9/L (ref 0–1.3)
MONOCYTES NFR BLD AUTO: 9.7 %
MUCOUS THREADS #/AREA URNS LPF: PRESENT /LPF
NEUTROPHILS # BLD AUTO: 5.1 10E9/L (ref 1.6–8.3)
NEUTROPHILS NFR BLD AUTO: 75.8 %
NITRATE UR QL: NEGATIVE
PH UR STRIP: 6 PH (ref 5–7)
PLATELET # BLD AUTO: 450 10E9/L (ref 150–450)
POTASSIUM SERPL-SCNC: 4.2 MMOL/L (ref 3.4–5.3)
PROT SERPL-MCNC: 6.8 G/DL (ref 6.8–8.8)
RBC # BLD AUTO: 3.29 10E12/L (ref 4.4–5.9)
RBC #/AREA URNS AUTO: <1 /HPF (ref 0–2)
SODIUM SERPL-SCNC: 137 MMOL/L (ref 133–144)
SOURCE: ABNORMAL
SP GR UR STRIP: 1.01 (ref 1–1.03)
UROBILINOGEN UR STRIP-MCNC: 0 MG/DL (ref 0–2)
WBC # BLD AUTO: 6.7 10E9/L (ref 4–11)
WBC #/AREA URNS AUTO: 1 /HPF (ref 0–2)

## 2017-10-28 PROCEDURE — 80053 COMPREHEN METABOLIC PANEL: CPT | Performed by: EMERGENCY MEDICINE

## 2017-10-28 PROCEDURE — G0378 HOSPITAL OBSERVATION PER HR: HCPCS

## 2017-10-28 PROCEDURE — 25000128 H RX IP 250 OP 636: Performed by: FAMILY MEDICINE

## 2017-10-28 PROCEDURE — 99285 EMERGENCY DEPT VISIT HI MDM: CPT | Mod: 25

## 2017-10-28 PROCEDURE — 81001 URINALYSIS AUTO W/SCOPE: CPT | Performed by: EMERGENCY MEDICINE

## 2017-10-28 PROCEDURE — 96375 TX/PRO/DX INJ NEW DRUG ADDON: CPT

## 2017-10-28 PROCEDURE — 74176 CT ABD & PELVIS W/O CONTRAST: CPT

## 2017-10-28 PROCEDURE — 25000132 ZZH RX MED GY IP 250 OP 250 PS 637: Mod: GY | Performed by: FAMILY MEDICINE

## 2017-10-28 PROCEDURE — 82272 OCCULT BLD FECES 1-3 TESTS: CPT | Performed by: EMERGENCY MEDICINE

## 2017-10-28 PROCEDURE — A9270 NON-COVERED ITEM OR SERVICE: HCPCS | Mod: GY | Performed by: FAMILY MEDICINE

## 2017-10-28 PROCEDURE — 85018 HEMOGLOBIN: CPT | Mod: 91 | Performed by: FAMILY MEDICINE

## 2017-10-28 PROCEDURE — 99285 EMERGENCY DEPT VISIT HI MDM: CPT | Mod: 25 | Performed by: EMERGENCY MEDICINE

## 2017-10-28 PROCEDURE — 96376 TX/PRO/DX INJ SAME DRUG ADON: CPT

## 2017-10-28 PROCEDURE — 36415 COLL VENOUS BLD VENIPUNCTURE: CPT | Performed by: FAMILY MEDICINE

## 2017-10-28 PROCEDURE — 85025 COMPLETE CBC W/AUTO DIFF WBC: CPT | Performed by: EMERGENCY MEDICINE

## 2017-10-28 PROCEDURE — 99207 ZZC MOONLIGHTING INDICATOR: CPT | Performed by: FAMILY MEDICINE

## 2017-10-28 PROCEDURE — 96374 THER/PROPH/DIAG INJ IV PUSH: CPT

## 2017-10-28 PROCEDURE — 85610 PROTHROMBIN TIME: CPT | Performed by: EMERGENCY MEDICINE

## 2017-10-28 PROCEDURE — 96361 HYDRATE IV INFUSION ADD-ON: CPT

## 2017-10-28 PROCEDURE — 85730 THROMBOPLASTIN TIME PARTIAL: CPT | Performed by: EMERGENCY MEDICINE

## 2017-10-28 PROCEDURE — 99218 ZZC INITIAL OBSERVATION CARE,LEVL I: CPT | Performed by: FAMILY MEDICINE

## 2017-10-28 PROCEDURE — 25000128 H RX IP 250 OP 636: Performed by: EMERGENCY MEDICINE

## 2017-10-28 RX ORDER — ALPRAZOLAM 0.5 MG
0.5 TABLET ORAL 3 TIMES DAILY PRN
Status: DISCONTINUED | OUTPATIENT
Start: 2017-10-28 | End: 2017-10-29 | Stop reason: HOSPADM

## 2017-10-28 RX ORDER — LORAZEPAM 0.5 MG/1
.5-1 TABLET ORAL EVERY 4 HOURS PRN
Status: DISCONTINUED | OUTPATIENT
Start: 2017-10-28 | End: 2017-10-28

## 2017-10-28 RX ORDER — LIDOCAINE 40 MG/G
CREAM TOPICAL
Status: DISCONTINUED | OUTPATIENT
Start: 2017-10-28 | End: 2017-10-28

## 2017-10-28 RX ORDER — LORAZEPAM 2 MG/ML
0.5 INJECTION INTRAMUSCULAR ONCE
Status: COMPLETED | OUTPATIENT
Start: 2017-10-28 | End: 2017-10-28

## 2017-10-28 RX ORDER — HYDROMORPHONE HYDROCHLORIDE 1 MG/ML
0.5 INJECTION, SOLUTION INTRAMUSCULAR; INTRAVENOUS; SUBCUTANEOUS
Status: COMPLETED | OUTPATIENT
Start: 2017-10-28 | End: 2017-10-28

## 2017-10-28 RX ORDER — DOCUSATE SODIUM 100 MG/1
100 CAPSULE, LIQUID FILLED ORAL 2 TIMES DAILY
Status: DISCONTINUED | OUTPATIENT
Start: 2017-10-28 | End: 2017-10-29 | Stop reason: HOSPADM

## 2017-10-28 RX ORDER — POLYETHYLENE GLYCOL 3350 17 G/17G
17 POWDER, FOR SOLUTION ORAL DAILY PRN
Status: DISCONTINUED | OUTPATIENT
Start: 2017-10-28 | End: 2017-10-29 | Stop reason: HOSPADM

## 2017-10-28 RX ORDER — NALOXONE HYDROCHLORIDE 0.4 MG/ML
.1-.4 INJECTION, SOLUTION INTRAMUSCULAR; INTRAVENOUS; SUBCUTANEOUS
Status: DISCONTINUED | OUTPATIENT
Start: 2017-10-28 | End: 2017-10-29 | Stop reason: HOSPADM

## 2017-10-28 RX ORDER — ONDANSETRON 2 MG/ML
4 INJECTION INTRAMUSCULAR; INTRAVENOUS
Status: COMPLETED | OUTPATIENT
Start: 2017-10-28 | End: 2017-10-28

## 2017-10-28 RX ORDER — ONDANSETRON 2 MG/ML
4 INJECTION INTRAMUSCULAR; INTRAVENOUS EVERY 6 HOURS PRN
Status: DISCONTINUED | OUTPATIENT
Start: 2017-10-28 | End: 2017-10-28

## 2017-10-28 RX ORDER — CITALOPRAM HYDROBROMIDE 20 MG/1
40 TABLET ORAL DAILY
Status: DISCONTINUED | OUTPATIENT
Start: 2017-10-29 | End: 2017-10-29 | Stop reason: HOSPADM

## 2017-10-28 RX ORDER — LIDOCAINE 40 MG/G
CREAM TOPICAL
Status: DISCONTINUED | OUTPATIENT
Start: 2017-10-28 | End: 2017-10-29 | Stop reason: HOSPADM

## 2017-10-28 RX ORDER — LORAZEPAM 2 MG/ML
.5-1 INJECTION INTRAMUSCULAR EVERY 4 HOURS PRN
Status: DISCONTINUED | OUTPATIENT
Start: 2017-10-28 | End: 2017-10-28

## 2017-10-28 RX ORDER — DEXTROSE MONOHYDRATE, SODIUM CHLORIDE, AND POTASSIUM CHLORIDE 50; 1.49; 4.5 G/1000ML; G/1000ML; G/1000ML
INJECTION, SOLUTION INTRAVENOUS CONTINUOUS
Status: DISCONTINUED | OUTPATIENT
Start: 2017-10-28 | End: 2017-10-28

## 2017-10-28 RX ORDER — HYDROMORPHONE HYDROCHLORIDE 1 MG/ML
0.5 INJECTION, SOLUTION INTRAMUSCULAR; INTRAVENOUS; SUBCUTANEOUS
Status: DISCONTINUED | OUTPATIENT
Start: 2017-10-28 | End: 2017-10-28

## 2017-10-28 RX ORDER — ZOLPIDEM TARTRATE 5 MG/1
5 TABLET ORAL
Status: DISCONTINUED | OUTPATIENT
Start: 2017-10-28 | End: 2017-10-29 | Stop reason: HOSPADM

## 2017-10-28 RX ORDER — HYDROCORTISONE ACETATE 25 MG/1
25 SUPPOSITORY RECTAL 2 TIMES DAILY
Status: DISCONTINUED | OUTPATIENT
Start: 2017-10-28 | End: 2017-10-28 | Stop reason: DRUGHIGH

## 2017-10-28 RX ORDER — LIDOCAINE 40 MG/G
CREAM TOPICAL
Status: DISCONTINUED | OUTPATIENT
Start: 2017-10-28 | End: 2017-10-29

## 2017-10-28 RX ORDER — BENZOCAINE/MENTHOL 6 MG-10 MG
LOZENGE MUCOUS MEMBRANE 2 TIMES DAILY PRN
Status: DISCONTINUED | OUTPATIENT
Start: 2017-10-28 | End: 2017-10-29 | Stop reason: HOSPADM

## 2017-10-28 RX ORDER — CITALOPRAM HYDROBROMIDE 20 MG/1
40 TABLET ORAL DAILY
Status: DISCONTINUED | OUTPATIENT
Start: 2017-10-28 | End: 2017-10-28

## 2017-10-28 RX ORDER — HYDROCODONE BITARTRATE AND ACETAMINOPHEN 7.5; 325 MG/1; MG/1
1 TABLET ORAL EVERY 4 HOURS PRN
Status: DISCONTINUED | OUTPATIENT
Start: 2017-10-28 | End: 2017-10-29

## 2017-10-28 RX ORDER — AMOXICILLIN 250 MG
1-2 CAPSULE ORAL 2 TIMES DAILY PRN
Status: DISCONTINUED | OUTPATIENT
Start: 2017-10-28 | End: 2017-10-29 | Stop reason: HOSPADM

## 2017-10-28 RX ORDER — SODIUM CHLORIDE 9 MG/ML
INJECTION, SOLUTION INTRAVENOUS CONTINUOUS
Status: DISCONTINUED | OUTPATIENT
Start: 2017-10-28 | End: 2017-10-29

## 2017-10-28 RX ORDER — NALOXONE HYDROCHLORIDE 0.4 MG/ML
.1-.4 INJECTION, SOLUTION INTRAMUSCULAR; INTRAVENOUS; SUBCUTANEOUS
Status: DISCONTINUED | OUTPATIENT
Start: 2017-10-28 | End: 2017-10-28

## 2017-10-28 RX ORDER — HYDROMORPHONE HYDROCHLORIDE 1 MG/ML
0.5 INJECTION, SOLUTION INTRAMUSCULAR; INTRAVENOUS; SUBCUTANEOUS
Status: DISCONTINUED | OUTPATIENT
Start: 2017-10-28 | End: 2017-10-29 | Stop reason: HOSPADM

## 2017-10-28 RX ORDER — LISINOPRIL 2.5 MG/1
5 TABLET ORAL DAILY
Status: DISCONTINUED | OUTPATIENT
Start: 2017-10-28 | End: 2017-10-28

## 2017-10-28 RX ORDER — HYDROCODONE BITARTRATE AND ACETAMINOPHEN 5; 325 MG/1; MG/1
1-2 TABLET ORAL EVERY 4 HOURS PRN
Status: DISCONTINUED | OUTPATIENT
Start: 2017-10-28 | End: 2017-10-28

## 2017-10-28 RX ORDER — ONDANSETRON 4 MG/1
4 TABLET, ORALLY DISINTEGRATING ORAL EVERY 6 HOURS PRN
Status: DISCONTINUED | OUTPATIENT
Start: 2017-10-28 | End: 2017-10-28

## 2017-10-28 RX ORDER — ZOLPIDEM TARTRATE 5 MG/1
10 TABLET ORAL
Status: DISCONTINUED | OUTPATIENT
Start: 2017-10-28 | End: 2017-10-28

## 2017-10-28 RX ORDER — LISINOPRIL 2.5 MG/1
5 TABLET ORAL DAILY
Status: DISCONTINUED | OUTPATIENT
Start: 2017-10-29 | End: 2017-10-29 | Stop reason: HOSPADM

## 2017-10-28 RX ADMIN — SODIUM CHLORIDE 1000 ML: 9 INJECTION, SOLUTION INTRAVENOUS at 14:21

## 2017-10-28 RX ADMIN — HYDROCODONE BITARTRATE AND ACETAMINOPHEN 1 TABLET: 7.5; 325 TABLET ORAL at 19:16

## 2017-10-28 RX ADMIN — HYDROCORTISONE: 10 CREAM TOPICAL at 21:49

## 2017-10-28 RX ADMIN — HYDROMORPHONE HYDROCHLORIDE 0.5 MG: 1 INJECTION, SOLUTION INTRAMUSCULAR; INTRAVENOUS; SUBCUTANEOUS at 13:01

## 2017-10-28 RX ADMIN — LORAZEPAM 0.5 MG: 2 INJECTION INTRAMUSCULAR; INTRAVENOUS at 15:38

## 2017-10-28 RX ADMIN — ONDANSETRON 4 MG: 2 INJECTION INTRAMUSCULAR; INTRAVENOUS at 13:09

## 2017-10-28 RX ADMIN — DOCUSATE SODIUM 100 MG: 100 CAPSULE, LIQUID FILLED ORAL at 21:15

## 2017-10-28 RX ADMIN — HYDROMORPHONE HYDROCHLORIDE 0.5 MG: 1 INJECTION, SOLUTION INTRAMUSCULAR; INTRAVENOUS; SUBCUTANEOUS at 21:15

## 2017-10-28 RX ADMIN — SENNOSIDES AND DOCUSATE SODIUM 1 TABLET: 8.6; 5 TABLET ORAL at 21:15

## 2017-10-28 RX ADMIN — ZOLPIDEM TARTRATE 5 MG: 5 TABLET, FILM COATED ORAL at 21:16

## 2017-10-28 RX ADMIN — SODIUM CHLORIDE: 9 INJECTION, SOLUTION INTRAVENOUS at 21:17

## 2017-10-28 RX ADMIN — HYDROMORPHONE HYDROCHLORIDE 0.5 MG: 1 INJECTION, SOLUTION INTRAMUSCULAR; INTRAVENOUS; SUBCUTANEOUS at 14:21

## 2017-10-28 RX ADMIN — ALPRAZOLAM 0.5 MG: 0.5 TABLET ORAL at 20:05

## 2017-10-28 RX ADMIN — HYDROMORPHONE HYDROCHLORIDE 0.5 MG: 1 INJECTION, SOLUTION INTRAMUSCULAR; INTRAVENOUS; SUBCUTANEOUS at 16:32

## 2017-10-28 RX ADMIN — HYDROMORPHONE HYDROCHLORIDE 0.5 MG: 1 INJECTION, SOLUTION INTRAMUSCULAR; INTRAVENOUS; SUBCUTANEOUS at 12:40

## 2017-10-28 ASSESSMENT — PAIN DESCRIPTION - DESCRIPTORS
DESCRIPTORS: ACHING;CONSTANT

## 2017-10-28 ASSESSMENT — ENCOUNTER SYMPTOMS
VOMITING: 0
RECTAL PAIN: 1
DIARRHEA: 0
CHILLS: 0
CONSTIPATION: 0
BLOOD IN STOOL: 0
FEVER: 0

## 2017-10-28 NOTE — ED NOTES
ED Nursing criteria listed below was addressed during verbal handoff:     Abnormal vitals: N/A  Abnormal results: Yes  Med Reconciliation completed: Yes  Meds given in ED: Yes  Any Overdue Meds: N/A  Core Measures: N/A  Isolation: N/A  Special needs: N/A  Skin assessment: Yes    Observation Patient  Education provided: No ( wife and pt declined)

## 2017-10-28 NOTE — ED PROVIDER NOTES
History     Chief Complaint   Patient presents with     Rectal pain and swelling     The history is provided by the patient.     Quan Murphy is a 67 year old male who presents to the ED with wife for evaluation of rectal pain and swelling. Pain started yesterday afternoon and continued to worsen throughout the night. He had a bowel movement yesterday morning, which was normal. He did not experience pain right away. Later on in the day he started feeling pain around his buttocks and then felt a lump. During the night the lump got bigger, harder and more painful. He noticed that his scrotum was twice the size as well. Has never had a hemorrhoid before. Had extracorporeal shock wave lithotripsy on 10/18/17. Was sent home on Keflex TID x 5 days. Had low-grade fever up to 100.9 a week after surgery. Has not had a fever for the past two days. Denies troubles urinating, chills, diarrhea, constipation, blood in stool or any other associated symptoms.       Problem List:    Patient Active Problem List    Diagnosis Date Noted     Syncope 10/18/2017     Priority: Medium     S/P ORIF (open reduction internal fixation) fracture 08/03/2017     Priority: Medium     Closed Colles' fracture of right radius with routine healing, subsequent encounter 08/03/2017     Priority: Medium     Status post insertion of drug-eluting stent into left anterior descending artery for coronary artery disease 01/05/2017     Priority: Medium     Chest pain 12/24/2016     Priority: Medium     Arthropathy 02/04/2014     Priority: Medium     Problem list name updated by automated process. Provider to review       Coronary atherosclerosis      Priority: Medium     Coronary artery disease  Problem list name updated by automated process. Provider to review       Hallux rigidus 07/16/2013     Priority: Medium     Inguinal hernia 06/28/2013     Priority: Medium     Degenerative arthritis of foot 06/28/2013     Priority: Medium     Advanced directives,  counseling/discussion 05/24/2013     Priority: Medium     Advance Care Planning:   Receipt of ACP document:  Received: Health Care Directive which was witnessed or notarized on 8-18-08.  Document previously scanned on 7-8-13.  Validation form completed and sent to be scanned.  Code Status needs to be updated to reflect choices in most recent ACP document. Confirmed/documented designated decision maker(s). See permanent comments section of demographics in clinical tab. View document(s) and details by clicking on code status.   Added by Vandana Platt RN, System ACP Coordinator on 7/22/2013.    Advance Care Planning:   ACP Review and Resources Provided:  Reviewed chart for advance care plan.  Quan Murphy has no plan or code status on file however states presence of ACP document. Copy requested. Confirmed code status reflects current choices pending receipt of document/advance care plan review. Confirmed/documented designated decision maker(s). See permanent comments section of demographics in clinical tab.   Added by Krysten Jasmine on 5/24/2013             Hypertension goal BP (blood pressure) < 140/90 06/12/2012     Priority: Medium     Hyperlipidemia LDL goal <130 12/22/2011     Priority: Medium     Anxiety 11/02/2011     Priority: Medium     Allergic conjunctivitis 07/08/2008     Priority: Medium     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, insomnia type 11/17/2006     Priority: Medium     Problem list name updated by automated process. Provider to review       Acute reaction to stress 01/12/2005     Priority: Medium     Problem list name updated by automated process. Provider to review       Chronic maxillary sinusitis 01/12/2005     Priority: Medium     Contracture of palmar fascia 01/12/2005     Priority: Medium     Benign neoplasm of skin of other and unspecified parts of face 01/12/2005     Priority: Medium     Malignant neoplasm of prostate (H) 11/26/2004     Priority: Medium        Past Medical  History:    Past Medical History:   Diagnosis Date     Allergy, unspecified not elsewhere classified      Antiplatelet or antithrombotic long-term use      Anxiety      Arthritis      Chest pain      Chronic sinusitis      Coronary atherosclerosis of unspecified type of vessel, native or graft      Hyperlipidemia      Hypertension      Inguinal hernia      Kidney stones      Malignant neoplasm of prostate (H)      Prostate cancer (H)        Past Surgical History:    Past Surgical History:   Procedure Laterality Date     ARTHRODESIS FOOT  7/23/2013    Procedure: ARTHRODESIS FOOT;  Great Toe Arthrodesis Left Foot;  Surgeon: Ash Gonzalez DPM;  Location: [facility]     ARTHRODESIS FOOT  6/10/2014    Procedure: ARTHRODESIS FOOT;  Surgeon: Ash Gonzalez DPM;  Location: [facility] OR     C LAPAROSCOPY, SURGICAL PROSTATECTOMY, RETROPUBIC RADICAL, W/NERVE SPARING  11/30/2004    With full bilateral pelvic lymphadenectomy.  [id number].     C TOTAL KNEE ARTHROPLASTY  05/01/08    Left knee     COLONOSCOPY  10/7/2013    Procedure: COLONOSCOPY;  Colonoscopy;  Surgeon: Mike Fallon MD;  Location:  GI     EXTRACORPOREAL SHOCK WAVE LITHOTRIPSY (ESWL) Bilateral 10/18/2017    Procedure: EXTRACORPOREAL SHOCK WAVE LITHOTRIPSY (ESWL);  BILATERAL EXTRACORPOREAL SHOCKWAVE LITHOTRIPSY ;  Surgeon: Meir Torres MD;  Location: [facility] OR     HC CORRECT BUNION,SIMPLE  08/11/2005    x3     HC REMV TOE BENIGN BONE LESN  08/11/2005     HERNIORRHAPHY INGUINAL  7/3/2013    Procedure: HERNIORRHAPHY INGUINAL;  Open Repair Inguinal hernia Right with mesh ;  Surgeon: Sam Escobar MD;  Location: [facility] OR     MOHS MICROGRAPHIC PROCEDURE  08/23/11    ear and chin-[facility]     OPEN REDUCTION INTERNAL FIXATION WRIST Right 7/18/2017    Procedure: OPEN REDUCTION INTERNAL FIXATION WRIST;  Right distal radius open reduction and internal fixation;  Surgeon: Pedro Blanca DO;  Location: [facility]     RECONSTRUCT FOREFOOT WITH  "METATARSOPHALANGEAL (MTP) FUSION  6/10/2014    Procedure: RECONSTRUCT FOREFOOT WITH METATARSOPHALANGEAL (MTP) FUSION;  Surgeon: Ash Gonzalez DPM;  Location: [facility]     STENT, CORONARY, DEMI       SURGICAL HISTORY OF -   1999/1974    lt knee     SURGICAL HISTORY OF -   10/2004    lithotripsy     SURGICAL HISTORY OF -   11/05    angiogram with stent       Family History:    Family History   Problem Relation Age of Onset     Hypertension Father      has had MI      Connective Tissue Disorder Mother      LUPUS     HEART DISEASE Mother      poor valve-needing replacement       Social History:  Marital Status:   [2]  Social History   Substance Use Topics     Smoking status: Never Smoker     Smokeless tobacco: Never Used     Alcohol use 5.0 oz/week     10 Cans of beer per week      Comment: occasional         Medications:      oxyCODONE (ROXICODONE) 5 MG IR tablet   hydrOXYzine (VISTARIL) 25 MG capsule   HYDROcodone-acetaminophen (NORCO) 7.5-325 MG per tablet   lisinopril (PRINIVIL/ZESTRIL) 5 MG tablet   ALPRAZolam (XANAX) 0.5 MG tablet   citalopram (CELEXA) 40 MG tablet   zolpidem (AMBIEN) 10 MG tablet   rosuvastatin (CRESTOR) 40 MG tablet   cetirizine (ZYRTEC) 10 MG tablet   clopidogrel (PLAVIX) 75 MG tablet   aspirin 81 MG EC tablet   nitroglycerin (NITROSTAT) 0.4 MG sublingual tablet   CIALIS 20 MG tablet       Review of Systems   Constitutional: Negative for chills and fever.   Gastrointestinal: Positive for rectal pain. Negative for blood in stool, constipation, diarrhea and vomiting.   Genitourinary: Positive for scrotal swelling.   All other systems reviewed and are negative.      Physical Exam   BP: 119/86  Pulse: 99  Temp: 97.8  F (36.6  C)  Resp: 19  Height: 172.7 cm (5' 8\")  Weight: 90.7 kg (200 lb)  SpO2: 99 %  Lying Orthostatic BP: 136/86  Lying Orthostatic Pulse: 79 bpm  Sitting Orthostatic BP: 142/88  Sitting Orthostatic Pulse: 81 bpm  Standing Orthostatic BP: 134/84  Standing Orthostatic " Pulse: 81 bpm (no dizziness or lightheadedness)      Physical Exam   Constitutional: He is oriented to person, place, and time. He appears well-developed and well-nourished. He appears distressed.   HENT:   Head: Normocephalic and atraumatic.   Eyes: Conjunctivae are normal. No scleral icterus.   Neck: Normal range of motion. Neck supple.   Genitourinary: Rectal exam shows external hemorrhoid. Rectal exam shows anal tone normal.   Genitourinary Comments: Perirectal ecchymosis and swelling consistent with hemorrhoid; ecchymosis and swelling of the scrotal area   Lymphadenopathy:     He has no cervical adenopathy.   Neurological: He is alert and oriented to person, place, and time.   Skin: Skin is warm and dry. No rash noted. No pallor.   Psychiatric: He has a normal mood and affect. His behavior is normal.   Nursing note and vitals reviewed.    ED Course     ED Course     Procedures               Critical Care time:  none               Labs Ordered and Resulted from Time of ED Arrival Up to the Time of Departure from the ED   CBC WITH PLATELETS DIFFERENTIAL - Abnormal; Notable for the following:        Result Value    RBC Count 3.29 (*)     Hemoglobin 9.3 (*)     Hematocrit 29.1 (*)     All other components within normal limits   PARTIAL THROMBOPLASTIN TIME - Abnormal; Notable for the following:     PTT 47 (*)     All other components within normal limits   COMPREHENSIVE METABOLIC PANEL - Abnormal; Notable for the following:     Bilirubin Total 1.7 (*)     Albumin 3.1 (*)     All other components within normal limits   ROUTINE UA WITH MICROSCOPIC - Abnormal; Notable for the following:     Blood Urine Small (*)     Mucous Urine Present (*)     All other components within normal limits   INR   OCCULT BLOOD STOOL   PERIPHERAL IV CATHETER     Results for orders placed or performed during the hospital encounter of 10/28/17 (from the past 24 hour(s))   CBC with platelets differential   Result Value Ref Range    WBC 6.7 4.0  - 11.0 10e9/L    RBC Count 3.29 (L) 4.4 - 5.9 10e12/L    Hemoglobin 9.3 (L) 13.3 - 17.7 g/dL    Hematocrit 29.1 (L) 40.0 - 53.0 %    MCV 88 78 - 100 fl    MCH 28.3 26.5 - 33.0 pg    MCHC 32.0 31.5 - 36.5 g/dL    RDW 14.1 10.0 - 15.0 %    Platelet Count 450 150 - 450 10e9/L    Diff Method Automated Method     % Neutrophils 75.8 %    % Lymphocytes 13.6 %    % Monocytes 9.7 %    % Eosinophils 0.3 %    % Basophils 0.3 %    % Immature Granulocytes 0.3 %    Absolute Neutrophil 5.1 1.6 - 8.3 10e9/L    Absolute Lymphocytes 0.9 0.8 - 5.3 10e9/L    Absolute Monocytes 0.7 0.0 - 1.3 10e9/L    Absolute Eosinophils 0.0 0.0 - 0.7 10e9/L    Absolute Basophils 0.0 0.0 - 0.2 10e9/L    Abs Immature Granulocytes 0.0 0 - 0.4 10e9/L   INR   Result Value Ref Range    INR 1.08 0.86 - 1.14   Partial thromboplastin time   Result Value Ref Range    PTT 47 (H) 22 - 37 sec   Comprehensive metabolic panel   Result Value Ref Range    Sodium 137 133 - 144 mmol/L    Potassium 4.2 3.4 - 5.3 mmol/L    Chloride 104 94 - 109 mmol/L    Carbon Dioxide 24 20 - 32 mmol/L    Anion Gap 9 3 - 14 mmol/L    Glucose 99 70 - 99 mg/dL    Urea Nitrogen 15 7 - 30 mg/dL    Creatinine 1.15 0.66 - 1.25 mg/dL    GFR Estimate 64 >60 mL/min/1.7m2    GFR Estimate If Black 77 >60 mL/min/1.7m2    Calcium 9.1 8.5 - 10.1 mg/dL    Bilirubin Total 1.7 (H) 0.2 - 1.3 mg/dL    Albumin 3.1 (L) 3.4 - 5.0 g/dL    Protein Total 6.8 6.8 - 8.8 g/dL    Alkaline Phosphatase 76 40 - 150 U/L    ALT 22 0 - 70 U/L    AST 31 0 - 45 U/L   Occult blood stool   Result Value Ref Range    Occult Blood Negative NEG^Negative   CT Abdomen Pelvis WITHOUT Contrast (stone protocol)    Narrative    ABDOMEN AND PELVIS CT WITHOUT CONTRAST 10/28/2017 2:03 PM    HISTORY: Status post lithotripsy with severe pelvic pain and bruising  of the scrotum.    COMPARISON: 10/11/2017    TECHNIQUE: Axial images performed from the lung bases to the ischial  tuberosities without IV or oral contrast. Coronal reformatted  images  were also evaluated. Radiation dose for this scan was reduced using  automated exposure control, adjustment of the mA and/or kV according  to patient size, or iterative reconstruction technique.    FINDINGS: The kidneys are enlarged and heterogeneous in attenuation.  There are areas of increased attenuation in both kidneys consistent  with hemorrhage. There is a moderate amount of perinephric hemorrhage  posterior and inferior to the right kidney, and a small amount of  hemorrhagic fluid surrounding the left kidney. Small nonobstructing  bilateral renal calculi, decreased in number since the previous  examination. There are also small bladder calculi present.    The lung bases are clear. Allowing for unenhanced technique, the  liver, spleen, pancreas, and adrenal glands are unremarkable. The  bowel is normal in caliber. There are scattered colonic diverticula. A  right iliac stent is again noted.      Impression    IMPRESSION:   1. Bilateral renal hematomas which are likely subcapsular. There are  also perinephric hematomas, right greater than left.  2. Bilateral nonobstructing renal calculi as well as bladder calculi.    Findings were discussed with Dr. Sexton at the time of dictation.   UA with Microscopic   Result Value Ref Range    Color Urine Yellow     Appearance Urine Clear     Glucose Urine Negative NEG^Negative mg/dL    Bilirubin Urine Negative NEG^Negative    Ketones Urine Negative NEG^Negative mg/dL    Specific Gravity Urine 1.008 1.003 - 1.035    Blood Urine Small (A) NEG^Negative    pH Urine 6.0 5.0 - 7.0 pH    Protein Albumin Urine Negative NEG^Negative mg/dL    Urobilinogen mg/dL 0.0 0.0 - 2.0 mg/dL    Nitrite Urine Negative NEG^Negative    Leukocyte Esterase Urine Negative NEG^Negative    Source Unspecified Urine     WBC Urine 1 0 - 2 /HPF    RBC Urine <1 0 - 2 /HPF    Mucous Urine Present (A) NEG^Negative /LPF     *Note: Due to a large number of results and/or encounters for the requested  time period, some results have not been displayed. A complete set of results can be found in Results Review.       Medications   HYDROmorphone (PF) (DILAUDID) injection 0.5 mg (0.5 mg Intravenous Given 10/28/17 1421)   ondansetron (ZOFRAN) injection 4 mg (4 mg Intravenous Given 10/28/17 1309)   0.9% sodium chloride BOLUS (0 mLs Intravenous Stopped 10/28/17 1530)   LORazepam (ATIVAN) injection 0.5 mg (0.5 mg Intravenous Given 10/28/17 1538)         Assessments & Plan (with Medical Decision Making)  67-year-old male who is 10 days status post lithotripsy for bilateral ureteral lithiasis who presents with significant rectal pain and swelling consistent with a hemorrhoid.  His workup is also notable for some significant scrotal ecchymosis that is been present since lithotripsy.  His hemoglobin was checked today for the 1st time in 12 days is now 9.3 down from 13.5 on October 16.  Also his PTT is elevated at 47.  This has not been checked for 12 years.  He was on Plavix and aspirin up until 6 days before lithotripsy.  He reports he has not restarted these.  My suspicion is more of the bleeding was acutely we don't have any clear objective documentation of this.  I did consult Dr. Linda, who was on call for his urologist Dr. Torres.  A noncontrast CT is reported as above with a mixed picture of bilateral renal subcapsular hematomas.  Radiologist said the picture looked consistent with a combination of subacute and possible some acute bleeding.  He felt it was appropriate to keep the patient here, monitor serial hemoglobins and INR.  At this point we did not feel it was necessary to replace his clotting factors at this point.  He is hemodynamically stable here with normal orthostatic vital signs.  Rectal exam is negative for occult blood.  We'll continue to hold his anticoagulation.  He is admitted to observation to Dr. Owens.  If he became hemodynamically unstable Dr. Dixon felt the next step would be through  interventional radiology most likely.       I have reviewed the nursing notes.    I have reviewed the findings, diagnosis, plan and need for follow up with the patient.       New Prescriptions    No medications on file       Final diagnoses:   Kidney hematoma, unspecified laterality, initial encounter   Retroperitoneal hematoma   Hemorrhoids, unspecified hemorrhoid type     This document serves as a record of services personally performed by Dwight Sexton MD. It was created on their behalf by Judy Parks, a trained medical scribe. The creation of this record is based on the provider's personal observations and the statements of the patient. This document has been checked and approved by the attending provider.    Note: Chart documentation done in part with Dragon Voice Recognition software. Although reviewed after completion, some word and grammatical errors may remain.        10/28/2017   [facility] EMERGENCY DEPARTMENT     Dwight Sexton MD  10/28/17 0944

## 2017-10-28 NOTE — IP AVS SNAPSHOT
MRN:[id number]                      After Visit Summary   10/28/2017    Quan Murphy    MRN: [id number]           Thank you!     Thank you for choosing [facility] for your care. Our goal is always to provide you with excellent care. Hearing back from our patients is one way we can continue to improve our services. Please take a few minutes to complete the written survey that you may receive in the mail after you visit with us. Thank you!        Patient Information     Date Of Birth          1950        About your hospital stay     You were admitted on:  October 28, 2017 You last received care in the:  [address] [facility]    You were discharged on:  October 29, 2017        Reason for your hospital stay       Active Problems:    Renal hematoma    Retroperitoneal hematoma                    Who to Call     For medical emergencies, please call 911.  For non-urgent questions about your medical care, please call your primary care provider or clinic, [phone]          Attending Provider     Provider Specialty    Dwight Sexton MD Emergency Medicine    [facility], Danny Robledo MD Family Practice       Primary Care Provider Office Phone # Fax #    Jose Hernandez [phone]      After Care Instructions     Activity       Your activity upon discharge: activity as tolerated            Diet       Follow this diet upon discharge: Orders Placed This Encounter      Regular Diet Adult                    Follow-up Appointments     Follow-up and recommended labs and tests       Follow up with primary care provider, Jose Hernandez, within 1-2 wks, for hospital follow- up. The following labs/tests are recommended: hgb.                  Your next 10 appointments already scheduled     Nov 01, 2017  2:00 PM CDT   Kash Contreras with oJse Hernandez MD   [facility]    [address] [place] [id number]  "  [phone]            Nov 27, 2017  1:15 PM CST   XR KUB with PHXR1   [facility] ([facility])    [address] [facility] [address] [phone]           Please bring a list of your current medicines to your exam. (Include vitamins, minerals and over-thecounter medicines.) Leave your valuables at home.  Tell your doctor if there is a chance you may be pregnant.  You do not need to do anything special for this exam.              Pending Results     No orders found for last 3 day(s).            Statement of Approval     Ordered          10/29/17 1426  I have reviewed and agree with all the recommendations and orders detailed in this document.  EFFECTIVE NOW     Approved and electronically signed by:  Danny Owens MD             Admission Information     Date & Time Provider Department Dept. Phone    10/28/2017 Danny Owens MD [address] [facility] [phone]      Your Vitals Were     Blood Pressure Pulse Temperature Respirations Height Weight    138/83 (BP Location: Left arm) 74 97.5  F (36.4  C) (Tympanic) 18 1.727 m (5' 8\") 90 kg (198 lb 6.6 oz)    Pulse Oximetry BMI (Body Mass Index)                99% 30.17 kg/m2          [facility] Information     "[employer] gives you secure access to your electronic health record. If you see a primary care provider, you can also send messages to your care team and make appointments. If you have questions, please call your primary care clinic.  If you do not have a primary care provider, please call [phone] and they will assist you.        Care EveryWhere ID     This is your Care EveryWhere ID. This could be used by other organizations to access your [facility] medical records  [id number]        Equal Access to Services     MAGGIE HARRIS : Ramírez Aly, angelita ortiz, mitchell khan. [facility] [phone].    ATENCIÓN: Si " garima jennings, tiene a recinos disposición servicios gratuitos de asistencia lingüística. Ramin alarcon [phone].    We comply with applicable federal civil rights laws and [place] laws. We do not discriminate on the basis of race, color, national origin, age, disability, sex, sexual orientation, or gender identity.               Review of your medicines      CONTINUE these medicines which may have CHANGED, or have new prescriptions. If we are uncertain of the size of tablets/capsules you have at home, strength may be listed as something that might have changed.        Dose / Directions    oxyCODONE 5 MG IR tablet   Commonly known as:  ROXICODONE   This may have changed:  additional instructions   Used for:  Kidney hematoma, unspecified laterality, initial encounter        Dose:  5 mg   Take 1 tablet (5 mg) by mouth every 4 hours as needed for pain maximum 4 tablet(s) per day   Quantity:  18 tablet   Refills:  0         CONTINUE these medicines which have NOT CHANGED        Dose / Directions    ALPRAZolam 0.5 MG tablet   Commonly known as:  XANAX   Used for:  Anxiety        Dose:  0.5 mg   Take 1 tablet (0.5 mg) by mouth 3 times daily as needed for anxiety   Quantity:  90 tablet   Refills:  1       cetirizine 10 MG tablet   Commonly known as:  zyrTEC        Dose:  10 mg   Take 10 mg by mouth daily   Refills:  0       CIALIS 20 MG tablet   Generic drug:  tadalafil        Dose:  20 mg   Take 20 mg by mouth daily as needed   Refills:  0       citalopram 40 MG tablet   Commonly known as:  celeXA   Used for:  Anxiety        Dose:  40 mg   Take 1 tablet (40 mg) by mouth daily   Quantity:  30 tablet   Refills:  6       hydrOXYzine 25 MG capsule   Commonly known as:  VISTARIL   Used for:  Arthropathy        Dose:  25 mg   Take 1 capsule (25 mg) by mouth 3 times daily as needed for anxiety   Quantity:  60 capsule   Refills:  0       lisinopril 5 MG tablet   Commonly known as:  PRINIVIL/ZESTRIL   Used for:  Hypertension goal BP  (blood pressure) < 140/90        Dose:  5 mg   Take 1 tablet (5 mg) by mouth daily   Quantity:  90 tablet   Refills:  3       nitroGLYcerin 0.4 MG sublingual tablet   Commonly known as:  NITROSTAT   Used for:  Atherosclerosis of native coronary artery of native heart with unstable angina pectoris (H)        For chest pain place 1 tablet under the tongue every 5 minutes for 3 doses. If symptoms persist 5 minutes after 1st dose call 911.   Quantity:  25 tablet   Refills:  0       rosuvastatin 40 MG tablet   Commonly known as:  CRESTOR   Used for:  Hyperlipidemia LDL goal <130        Dose:  40 mg   Take 1 tablet (40 mg) by mouth daily   Quantity:  90 tablet   Refills:  3       zolpidem 10 MG tablet   Commonly known as:  AMBIEN   Used for:  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, insomnia type        Dose:  10 mg   Take 1 tablet (10 mg) by mouth nightly as needed for sleep   Quantity:  30 tablet   Refills:  5         STOP taking     aspirin 81 MG EC tablet           clopidogrel 75 MG tablet   Commonly known as:  PLAVIX           HYDROcodone-acetaminophen 7.5-325 MG per tablet   Commonly known as:  NORCO                Where to get your medicines      Some of these will need a paper prescription and others can be bought over the counter. Ask your nurse if you have questions.     Bring a paper prescription for each of these medications     oxyCODONE 5 MG IR tablet                Protect others around you: Learn how to safely use, store and throw away your medicines at www.disposemymeds.org.             Medication List: This is a list of all your medications and when to take them. Check marks below indicate your daily home schedule. Keep this list as a reference.      Medications           Morning Afternoon Evening Bedtime As Needed    ALPRAZolam 0.5 MG tablet   Commonly known as:  XANAX   Take 1 tablet (0.5 mg) by mouth 3 times daily as needed for anxiety   Last time this was given:  0.5 mg on 10/29/2017  5:23  AM                                   cetirizine 10 MG tablet   Commonly known as:  zyrTEC   Take 10 mg by mouth daily                                   CIALIS 20 MG tablet   Take 20 mg by mouth daily as needed   Generic drug:  tadalafil                                   citalopram 40 MG tablet   Commonly known as:  celeXA   Take 1 tablet (40 mg) by mouth daily   Last time this was given:  40 mg on 10/29/2017  8:16 AM                                   hydrOXYzine 25 MG capsule   Commonly known as:  VISTARIL   Take 1 capsule (25 mg) by mouth 3 times daily as needed for anxiety                                   lisinopril 5 MG tablet   Commonly known as:  PRINIVIL/ZESTRIL   Take 1 tablet (5 mg) by mouth daily   Last time this was given:  5 mg on 10/29/2017  8:15 AM                                   nitroGLYcerin 0.4 MG sublingual tablet   Commonly known as:  NITROSTAT   For chest pain place 1 tablet under the tongue every 5 minutes for 3 doses. If symptoms persist 5 minutes after 1st dose call 911.                                oxyCODONE 5 MG IR tablet   Commonly known as:  ROXICODONE   Take 1 tablet (5 mg) by mouth every 4 hours as needed for pain maximum 4 tablet(s) per day   Last time this was given:  5 mg on 10/29/2017 12:58 PM                                rosuvastatin 40 MG tablet   Commonly known as:  CRESTOR   Take 1 tablet (40 mg) by mouth daily                                zolpidem 10 MG tablet   Commonly known as:  AMBIEN   Take 1 tablet (10 mg) by mouth nightly as needed for sleep   Last time this was given:  5 mg on 10/29/2017  1:51 AM                                          More Information        Patient Education    Ascorbic Acid (Vitamin C), Ferrous Sulfate Oral tablet, extended-release    Carbonyl Iron Chewable tablet    Carbonyl Iron Oral suspension    Ferrous Fumarate Oral capsule, liquid filled    Ferrous Fumarate Oral tablet    Ferrous Fumarate Oral tablet, extended-release    Ferrous  Fumarate, Polysaccharide-Iron Complex Oral capsule    Ferrous Gluconate Oral tablet    Ferrous Sulfate Elixir    Ferrous Sulfate Gastro-resistant tablet    Ferrous Sulfate Oral capsule, extended-release    Ferrous Sulfate Oral drops, solution    Ferrous Sulfate Oral solution    Ferrous Sulfate Oral tablet    Ferrous Sulfate Oral tablet, extended-release    Iron Oral drops, solution    Iron Oral tablet    Iron Oral tablet, extended-release    Polysaccharide-Iron Complex, Heme Iron Polypeptide Oral tablet  Ferrous Sulfate Oral tablet  What is this medicine?  IRON (AHY oscar) replaces iron that is essential to healthy red blood cells. Iron is used to treat iron deficiency anemia. Anemia may cause problems like tiredness, shortness of breath, or slowed growth in children. Only take iron if your doctor has told you to. Do not treat yourself with iron if you are feeling tired. Most healthy people get enough iron in their diets, particularly if they eat cereals, meat, poultry, and fish.  This medicine may be used for other purposes; ask your health care provider or pharmacist if you have questions.  What should I tell my health care provider before I take this medicine?  They need to know if you have any of these conditions:    frequently drink alcohol    bowel disease    hemolytic anemia    iron overload (hemochromatosis, hemosiderosis)    liver disease    problems with swallowing    stomach ulcer or other stomach problems    an unusual or allergic reaction to iron, other medicines, foods, dyes, or preservatives    pregnant or trying to get pregnant    breast-feeding  How should I use this medicine?  Take this medicine by mouth with a glass of water or fruit juice. Follow the directions on the prescription label. Swallow whole. Do not crush or chew. Take this medicine in an upright or sitting position. Try to take any bedtime doses at least 10 minutes before lying down. You may take this medicine with food. Take your  medicine at regular intervals. Do not take your medicine more often than directed. Do not stop taking except on your doctor's advice.  Talk to your pediatrician regarding the use of this medicine in children. While this drug may be prescribed for selected conditions, precautions do apply.  Overdosage: If you think you have taken too much of this medicine contact a poison control center or emergency room at once.  NOTE: This medicine is only for you. Do not share this medicine with others.  What if I miss a dose?  If you miss a dose, take it as soon as you can. If it is almost time for your next dose, take only that dose. Do not take double or extra doses.  What may interact with this medicine?  If you are taking this iron product, you should not take iron in any other medicine or dietary supplement.  This medicine may also interact with the following medications:    alendronate    antacids    cefdinir    chloramphenicol    cholestyramine    deferoxamine    dimercaprol    etidronate    medicines for stomach ulcers or other stomach problems    pancreatic enzymes    quinolone antibiotics (examples: Cipro, Floxin, Levaquin, Tequin and others)    risedronate    tetracycline antibiotics (examples: doxycycline, tetracycline, minocycline, and others)    thyroid hormones  This list may not describe all possible interactions. Give your health care provider a list of all the medicines, herbs, non-prescription drugs, or dietary supplements you use. Also tell them if you smoke, drink alcohol, or use illegal drugs. Some items may interact with your medicine.  What should I watch for while using this medicine?  Use iron supplements only as directed by your health care professional. You will need important blood work while you are taking this medicine. It may take 3 to 6 months of therapy to treat low iron levels. Pregnant women should follow the dose and length of iron treatment as directed by their doctors.  Do not use iron  longer than prescribed, and do not take a higher dose than recommended. Long-term use may cause excess iron to build-up in the body.  Do not take iron with antacids. If you need to take an antacid, take it 2 hours after a dose of iron.  What side effects may I notice from receiving this medicine?  Side effects that you should report to your doctor or health care professional as soon as possible:    allergic reactions like skin rash, itching or hives, swelling of the face, lips, or tongue    blue lips, nails, or palms    dark colored stools (this may be due to the iron, but can indicate a more serious condition)    drowsiness    pain with or difficulty swallowing    pale or clammy skin    seizures    stomach pain    unusually weak or tired    vomiting    weak, fast, or irregular heartbeat  Side effects that usually do not require medical attention (report to your doctor or health care professional if they continue or are bothersome):    constipation    indigestion    nausea or stomach upset  This list may not describe all possible side effects. Call your doctor for medical advice about side effects. You may report side effects to FDA at 7-079-FDA-0790.  Where should I keep my medicine?  Keep out of the reach of children. Even small amounts of iron can be harmful to a child.  Store at room temperature between 15 and 30 degrees C (59 and 86 degrees F). Keep container tightly closed. Throw away any unused medicine after the expiration date.  NOTE:This sheet is a summary. It may not cover all possible information. If you have questions about this medicine, talk to your doctor, pharmacist, or health care provider. Copyright  2016 Gold Standard                Patient Education    Oxycodone Hydrochloride Oral capsule    Oxycodone Hydrochloride Oral solution    Oxycodone Hydrochloride Oral tablet    Oxycodone Hydrochloride Oral tablet [Abuse Deterrent]    Oxycodone Hydrochloride Oral tablet, extended-release  Oxycodone  Hydrochloride Oral tablet  What is this medicine?  OXYCODONE (ox i KOE done) is a pain reliever. It is used to treat moderate to severe pain.  This medicine may be used for other purposes; ask your health care provider or pharmacist if you have questions.  What should I tell my health care provider before I take this medicine?  They need to know if you have any of these conditions:    Concho's disease    brain tumor    drug abuse or addiction    head injury    heart disease    if you frequently drink alcohol containing drinks    kidney disease or problems going to the bathroom    liver disease    lung disease, asthma, or breathing problems    mental problems    an unusual or allergic reaction to oxycodone, codeine, hydrocodone, morphine, other medicines, foods, dyes, or preservatives    pregnant or trying to get pregnant    breast-feeding  How should I use this medicine?  Take this medicine by mouth with a glass of water. Follow the directions on the prescription label. You can take it with or without food. If it upsets your stomach, take it with food. Take your medicine at regular intervals. Do not take it more often than directed. Do not stop taking except on your doctor's advice.  Some brands of this medicine, like Oxecta, have special instructions. Ask your doctor or pharmacist if these directions are for you: Do not cut, crush or chew this medicine. Swallow only one tablet at a time. Do not wet, soak, or lick the tablet before you take it.  Talk to your pediatrician regarding the use of this medicine in children. Special care may be needed.  Overdosage: If you think you have taken too much of this medicine contact a poison control center or emergency room at once.  NOTE: This medicine is only for you. Do not share this medicine with others.  What if I miss a dose?  If you miss a dose, take it as soon as you can. If it is almost time for your next dose, take only that dose. Do not take double or extra  doses.  What may interact with this medicine?    alcohol    antihistamines    certain medicines used for nausea like chlorpromazine, droperidol    erythromycin    ketoconazole    medicines for depression, anxiety, or psychotic disturbances    medicines for sleep    muscle relaxants    naloxone    naltrexone    narcotic medicines (opiates) for pain    nilotinib    phenobarbital    phenytoin    rifampin    ritonavir    voriconazole  This list may not describe all possible interactions. Give your health care provider a list of all the medicines, herbs, non-prescription drugs, or dietary supplements you use. Also tell them if you smoke, drink alcohol, or use illegal drugs. Some items may interact with your medicine.  What should I watch for while using this medicine?  Tell your doctor or health care professional if your pain does not go away, if it gets worse, or if you have new or a different type of pain. You may develop tolerance to the medicine. Tolerance means that you will need a higher dose of the medicine for pain relief. Tolerance is normal and is expected if you take this medicine for a long time.  Do not suddenly stop taking your medicine because you may develop a severe reaction. Your body becomes used to the medicine. This does NOT mean you are addicted. Addiction is a behavior related to getting and using a drug for a non-medical reason. If you have pain, you have a medical reason to take pain medicine. Your doctor will tell you how much medicine to take. If your doctor wants you to stop the medicine, the dose will be slowly lowered over time to avoid any side effects.  You may get drowsy or dizzy when you first start taking this medicine or change doses. Do not drive, use machinery, or do anything that may be dangerous until you know how the medicine affects you. Stand or sit up slowly.  There are different types of narcotic medicines (opiates) for pain. If you take more than one type at the same time,  you may have more side effects. Give your health care provider a list of all medicines you use. Your doctor will tell you how much medicine to take. Do not take more medicine than directed. Call emergency for help if you have problems breathing.  This medicine will cause constipation. Try to have a bowel movement at least every 2 to 3 days. If you do not have a bowel movement for 3 days, call your doctor or health care professional.  Your mouth may get dry. Drinking water, chewing sugarless gum, or sucking on hard candy may help. See your dentist every 6 months.  What side effects may I notice from receiving this medicine?  Side effects that you should report to your doctor or health care professional as soon as possible:    allergic reactions like skin rash, itching or hives, swelling of the face, lips, or tongue    breathing problems    confusion    feeling faint or lightheaded, falls    trouble passing urine or change in the amount of urine    unusually weak or tired  Side effects that usually do not require medical attention (report to your doctor or health care professional if they continue or are bothersome):    constipation    dry mouth    itching    nausea, vomiting    upset stomach  This list may not describe all possible side effects. Call your doctor for medical advice about side effects. You may report side effects to FDA at 7-834-FDA-3138.  Where should I keep my medicine?  Keep out of the reach of children. This medicine can be abused. Keep your medicine in a safe place to protect it from theft. Do not share this medicine with anyone. Selling or giving away this medicine is dangerous and against the law.  Store at room temperature between 15 and 30 degrees C (59 and 86 degrees F). Protect from light. Keep container tightly closed.  This medicine may cause accidental overdose and death if it is taken by other adults, children, or pets. Flush any unused medicine down the toilet to reduce the chance of  harm. Do not use the medicine after the expiration date.  NOTE: This sheet is a summary. It may not cover all possible information. If you have questions about this medicine, talk to your doctor, pharmacist, or health care provider.  NOTE:This sheet is a summary. It may not cover all possible information. If you have questions about this medicine, talk to your doctor, pharmacist, or health care provider. Copyright  2016 Gold Standard

## 2017-10-28 NOTE — ED NOTES
Recent lithotripsy on 10/18 from [facility].  Had significant testicular swelling and bruising after procedure, bruising in groin.  Has been having increasing pain since procedure, was running low grade fevers for about 1 week.  Yesterday developed large swollen area to rectal area.  Has been on plavix and baby aspirin for about 1 year and stopped beginning of October for procedure.
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
## 2017-10-28 NOTE — H&P
[facility] History and Physical    Quan Murphy MRN# [id number]   Age: 67 year old YOB: 1950     Date of Admission:  10/28/2017    Primary care provider: Jose Hernandez          Assessment and Plan:   Quan Murphy is a 67 year old who presented to the ED with complaints of hemorrhoids but was found to have a significant retroperitoneal peritoneal hematoma stemming from his shock wave lithotripsy 10 days ago. In the ED vitals are stable.. Hemoglobin has dropped from 13 preoperatively now to 9. I suspect that the bleeding episode actually happened acutely postoperatively. Reviewing his discharge summary show that he is syncopal, hypotensive episode that was likely his bleeding event, and the blood we are seeing now is old. That said, I would like to bring him in observation and monitor his hemoglobin and vital signs overnight to make sure there is not ongoing occult bleeding.    Plan: Admit. Serial hemoglobins. Serial monitoring of vital signs. Hold aspirin and Plavix. Topical treatment for his hemorrhoids. I suspect low-grade fever is from his body's reabsorption of the hematomas. Discharge in the a.m. if stable. Follow-up with primary next week, we'll send a note to his urologist regarding complication.     Active Problems:    Renal hematoma    Retroperitoneal hematoma                 Chief Complaint:   Rectal pain and swelling      67 year old presents to the emergency department by private vehicle with complaints of hemorrhoids. He has felt more comfortable in his anal area and wanted checked out today. During his evaluation in the ED he was found to have a scrotal hematoma and upon further questioning some flank pain. He's also had a low-grade fever. This started after his shockwave lithotripsy for his bilateral renal stones.    In the ED, he underwent a CT scan which showed bilateral subcapsular hematomas and perinephric hematomas. His urinalysis showed small blood but no  infection. He is not complaining of flank pain. His vital signs were stable. His hemoglobin is 9.3, down from 13 preoperatively.      History is obtained from the patient              Past Medical History:     Past Medical History:   Diagnosis Date     Allergy, unspecified not elsewhere classified     Seasonal allergies, pollen, dust, smoke and animals     Antiplatelet or antithrombotic long-term use      Anxiety      Arthritis      Chest pain      Chronic sinusitis      Coronary atherosclerosis of unspecified type of vessel, native or graft     Coronary artery disease     Hyperlipidemia      Hypertension      Inguinal hernia      Kidney stones      Malignant neoplasm of prostate (H)     Prostate cancer     Prostate cancer (H)              Past Surgical History:      Past Surgical History:   Procedure Laterality Date     ARTHRODESIS FOOT  7/23/2013    Procedure: ARTHRODESIS FOOT;  Great Toe Arthrodesis Left Foot;  Surgeon: Ash Gonzalez DPM;  Location: [facility] OR     ARTHRODESIS FOOT  6/10/2014    Procedure: ARTHRODESIS FOOT;  Surgeon: Ash Gonzalez DPM;  Location:  OR     C LAPAROSCOPY, SURGICAL PROSTATECTOMY, RETROPUBIC RADICAL, W/NERVE SPARING  11/30/2004    With full bilateral pelvic lymphadenectomy.  F-[facility].     C TOTAL KNEE ARTHROPLASTY  05/01/08    Left knee     COLONOSCOPY  10/7/2013    Procedure: COLONOSCOPY;  Colonoscopy;  Surgeon: Mike Fallon MD;  Location:  GI     EXTRACORPOREAL SHOCK WAVE LITHOTRIPSY (ESWL) Bilateral 10/18/2017    Procedure: EXTRACORPOREAL SHOCK WAVE LITHOTRIPSY (ESWL);  BILATERAL EXTRACORPOREAL SHOCKWAVE LITHOTRIPSY ;  Surgeon: Meir Torres MD;  Location: [facility] OR     HC CORRECT BUNION,SIMPLE  08/11/2005    x3     HC REMV TOE BENIGN BONE LESN  08/11/2005     HERNIORRHAPHY INGUINAL  7/3/2013    Procedure: HERNIORRHAPHY INGUINAL;  Open Repair Inguinal hernia Right with mesh ;  Surgeon: Sam Escobar MD;  Location: [facility] OR     MOHS MICROGRAPHIC PROCEDURE  08/23/11     ear and chin-[facility]     OPEN REDUCTION INTERNAL FIXATION WRIST Right 7/18/2017    Procedure: OPEN REDUCTION INTERNAL FIXATION WRIST;  Right distal radius open reduction and internal fixation;  Surgeon: Pedro Blanca DO;  Location: [facility] OR     RECONSTRUCT FOREFOOT WITH METATARSOPHALANGEAL (MTP) FUSION  6/10/2014    Procedure: RECONSTRUCT FOREFOOT WITH METATARSOPHALANGEAL (MTP) FUSION;  Surgeon: Ash Gonzalez DPM;  Location: [facility] OR     STENT, CORONARY, DEMI       SURGICAL HISTORY OF -   1999/1974    lt knee     SURGICAL HISTORY OF -   10/2004    lithotripsy     SURGICAL HISTORY OF - 11/05    angiogram with stent             Social History:     Social History   Substance Use Topics     Smoking status: Never Smoker     Smokeless tobacco: Never Used     Alcohol use 5.0 oz/week     10 Cans of beer per week      Comment: occasional              Family History:   I have reviewed this patient's family history            Allergies:     Allergies   Allergen Reactions     Animal Dander      Dust Mites      Pollen Extract      Smoke.              Medications:     Prescriptions Prior to Admission   Medication Sig Dispense Refill Last Dose     oxyCODONE (ROXICODONE) 5 MG IR tablet Take 1 tablet (5 mg) by mouth every 4 hours as needed for pain maximum 6 tablet(s) per day 18 tablet 0 10/27/2017 at 2100     hydrOXYzine (VISTARIL) 25 MG capsule Take 1 capsule (25 mg) by mouth 3 times daily as needed for anxiety 60 capsule 0 10/28/2017 at 0800     HYDROcodone-acetaminophen (NORCO) 7.5-325 MG per tablet Take 1 tablet by mouth 5 times daily (Patient taking differently: Take 1 tablet by mouth as needed 5 times daily as needed) 150 tablet 0 10/28/2017 at 0800     lisinopril (PRINIVIL/ZESTRIL) 5 MG tablet Take 1 tablet (5 mg) by mouth daily 90 tablet 3 10/28/2017 at 0800     ALPRAZolam (XANAX) 0.5 MG tablet Take 1 tablet (0.5 mg) by mouth 3 times daily as needed for anxiety 90 tablet 1 10/28/2017 at 0800      citalopram (CELEXA) 40 MG tablet Take 1 tablet (40 mg) by mouth daily 30 tablet 6 10/27/2017 at 0800     zolpidem (AMBIEN) 10 MG tablet Take 1 tablet (10 mg) by mouth nightly as needed for sleep 30 tablet 5 10/27/2017 at 2200     rosuvastatin (CRESTOR) 40 MG tablet Take 1 tablet (40 mg) by mouth daily 90 tablet 3 10/27/2017 at 0800     cetirizine (ZYRTEC) 10 MG tablet Take 10 mg by mouth daily   Past Month at Unknown time     clopidogrel (PLAVIX) 75 MG tablet Take 1 tablet (75 mg) by mouth daily (Patient not taking: Reported on 10/16/2017) 90 tablet 3      aspirin 81 MG EC tablet Take 1 tablet (81 mg) by mouth daily (Patient not taking: Reported on 10/16/2017) 90 tablet 3      nitroglycerin (NITROSTAT) 0.4 MG sublingual tablet For chest pain place 1 tablet under the tongue every 5 minutes for 3 doses. If symptoms persist 5 minutes after 1st dose call 911. 25 tablet 0 More than a month at Unknown time     CIALIS 20 MG tablet Take 20 mg by mouth daily as needed    More than a month at Unknown time             Review of Systems:   The Review of Systems is negative other than noted in the HPI           Physical Exam:   Vitals were reviewed  Temp: 96.8  F (36  C) Temp src: Oral BP: 144/85 Pulse: 81 Heart Rate: 80 Resp: 16 SpO2: 99 % O2 Device: None (Room air)    Well-appearing male no distress. Heart regular. Lungs clear unlabored. No flank pain or ecchymosis. The scrotal has ecchymosis but no swelling. He has a large 2 cm thrombosed external hemorrhoid.          Data:   All laboratory data reviewed   All cardiac studies reviewed by me.   All imaging studies reviewed by me.      Attestation:  Care coordination / counseling time: 30 minutes     Danny Owens MD

## 2017-10-28 NOTE — IP AVS SNAPSHOT
[address] [facility]    [address] [place] [address]    Phone:  [phone]                                       After Visit Summary   10/28/2017    Quan Murphy    MRN: [id number]           After Visit Summary Signature Page     I have received my discharge instructions, and my questions have been answered. I have discussed any challenges I see with this plan with the nurse or doctor.    ..........................................................................................................................................  Patient/Patient Representative Signature      ..........................................................................................................................................  Patient Representative Print Name and Relationship to Patient    ..................................................               ................................................  Date                                            Time    ..........................................................................................................................................  Reviewed by Signature/Title    ...................................................              ..............................................  Date                                                            Time

## 2017-10-28 NOTE — ED NOTES
Had lithotripsy 10/18 at [facility]. Week of low grade temp. Rectal pain. Perineal area bruised.color pale.

## 2017-10-29 VITALS
TEMPERATURE: 97.5 F | BODY MASS INDEX: 30.07 KG/M2 | HEIGHT: 68 IN | WEIGHT: 198.41 LBS | DIASTOLIC BLOOD PRESSURE: 83 MMHG | RESPIRATION RATE: 18 BRPM | OXYGEN SATURATION: 99 % | HEART RATE: 74 BPM | SYSTOLIC BLOOD PRESSURE: 138 MMHG

## 2017-10-29 LAB
HGB BLD-MCNC: 8.3 G/DL (ref 13.3–17.7)
HGB BLD-MCNC: 8.6 G/DL (ref 13.3–17.7)

## 2017-10-29 PROCEDURE — 96361 HYDRATE IV INFUSION ADD-ON: CPT

## 2017-10-29 PROCEDURE — 99207 ZZC MOONLIGHTING INDICATOR: CPT | Performed by: FAMILY MEDICINE

## 2017-10-29 PROCEDURE — 25000128 H RX IP 250 OP 636: Performed by: FAMILY MEDICINE

## 2017-10-29 PROCEDURE — A9270 NON-COVERED ITEM OR SERVICE: HCPCS | Mod: GY | Performed by: FAMILY MEDICINE

## 2017-10-29 PROCEDURE — 99217 ZZC OBSERVATION CARE DISCHARGE: CPT | Performed by: FAMILY MEDICINE

## 2017-10-29 PROCEDURE — G0378 HOSPITAL OBSERVATION PER HR: HCPCS

## 2017-10-29 PROCEDURE — 25000132 ZZH RX MED GY IP 250 OP 250 PS 637: Mod: GY | Performed by: FAMILY MEDICINE

## 2017-10-29 PROCEDURE — 36415 COLL VENOUS BLD VENIPUNCTURE: CPT | Performed by: FAMILY MEDICINE

## 2017-10-29 PROCEDURE — 25000125 ZZHC RX 250: Performed by: FAMILY MEDICINE

## 2017-10-29 PROCEDURE — 85018 HEMOGLOBIN: CPT | Mod: 91 | Performed by: FAMILY MEDICINE

## 2017-10-29 PROCEDURE — 96376 TX/PRO/DX INJ SAME DRUG ADON: CPT

## 2017-10-29 RX ORDER — FERROUS SULFATE 325(65) MG
325 TABLET ORAL DAILY
Status: DISCONTINUED | OUTPATIENT
Start: 2017-10-29 | End: 2017-10-29 | Stop reason: HOSPADM

## 2017-10-29 RX ORDER — ONDANSETRON 4 MG/1
4 TABLET, ORALLY DISINTEGRATING ORAL EVERY 6 HOURS PRN
Status: DISCONTINUED | OUTPATIENT
Start: 2017-10-29 | End: 2017-10-29 | Stop reason: HOSPADM

## 2017-10-29 RX ORDER — OXYCODONE HYDROCHLORIDE 5 MG/1
5 TABLET ORAL
Status: DISCONTINUED | OUTPATIENT
Start: 2017-10-29 | End: 2017-10-29 | Stop reason: HOSPADM

## 2017-10-29 RX ORDER — OXYCODONE HYDROCHLORIDE 5 MG/1
5 TABLET ORAL EVERY 4 HOURS PRN
Qty: 18 TABLET | Refills: 0 | Status: SHIPPED | OUTPATIENT
Start: 2017-10-29 | End: 2017-11-01

## 2017-10-29 RX ADMIN — SODIUM CHLORIDE: 9 INJECTION, SOLUTION INTRAVENOUS at 04:38

## 2017-10-29 RX ADMIN — OXYCODONE HYDROCHLORIDE 5 MG: 5 TABLET ORAL at 12:58

## 2017-10-29 RX ADMIN — ALPRAZOLAM 0.5 MG: 0.5 TABLET ORAL at 05:23

## 2017-10-29 RX ADMIN — HYDROMORPHONE HYDROCHLORIDE 0.5 MG: 1 INJECTION, SOLUTION INTRAMUSCULAR; INTRAVENOUS; SUBCUTANEOUS at 08:05

## 2017-10-29 RX ADMIN — HYDROCODONE BITARTRATE AND ACETAMINOPHEN 1 TABLET: 7.5; 325 TABLET ORAL at 07:34

## 2017-10-29 RX ADMIN — HYDROCODONE BITARTRATE AND ACETAMINOPHEN 1 TABLET: 7.5; 325 TABLET ORAL at 00:09

## 2017-10-29 RX ADMIN — CITALOPRAM HYDROBROMIDE 40 MG: 20 TABLET ORAL at 08:16

## 2017-10-29 RX ADMIN — LISINOPRIL 5 MG: 2.5 TABLET ORAL at 08:15

## 2017-10-29 RX ADMIN — HYDROMORPHONE HYDROCHLORIDE 0.5 MG: 1 INJECTION, SOLUTION INTRAMUSCULAR; INTRAVENOUS; SUBCUTANEOUS at 12:25

## 2017-10-29 RX ADMIN — HYDROMORPHONE HYDROCHLORIDE 0.5 MG: 1 INJECTION, SOLUTION INTRAMUSCULAR; INTRAVENOUS; SUBCUTANEOUS at 03:12

## 2017-10-29 RX ADMIN — FERROUS SULFATE 325 MG: 325 TABLET, FILM COATED ORAL at 10:44

## 2017-10-29 RX ADMIN — DOCUSATE SODIUM 100 MG: 100 CAPSULE, LIQUID FILLED ORAL at 08:16

## 2017-10-29 RX ADMIN — ONDANSETRON 4 MG: 4 TABLET, ORALLY DISINTEGRATING ORAL at 10:44

## 2017-10-29 RX ADMIN — ZOLPIDEM TARTRATE 5 MG: 5 TABLET, FILM COATED ORAL at 01:51

## 2017-10-29 ASSESSMENT — PAIN DESCRIPTION - DESCRIPTORS
DESCRIPTORS: ACHING

## 2017-10-29 NOTE — PLAN OF CARE
Problem: Patient Care Overview  Goal: Plan of Care/Patient Progress Review  Outcome: No Change  VSS. Afebrile. Pt c/o pain/discomfor to rectum. PRN pain medication given per request per MAR. Meds effective. No complaints at this time. Pt encouraged to keep pressure off rectal area while in bed.

## 2017-10-29 NOTE — PROGRESS NOTES
"S-(situation): end of shift note    B-(background): renal hematoma, rectal pain    A-(assessment): VSS.  Slight temp this evening, 99.7, recheck was down to 97.9 orally.  Reports constant rectal pain, generally rated at 6/10.  Norco given with no relief.  Order obtained for IV dilaudid, 0.5mg improves pain.  Patient was feeling anxious and he and wife were concerned with no IVF being given due to not \"flushing kidneys\" with stone history and also with being told patient was dehydrated.  Order obtained for NS @ 125mL/hr.  Xanax given for anxiety.  Order received for ambien.  Patient takes 1/2 of dose around 9pm, and then wakes up later to take 2nd half, MD changed order to reflect this preference.  Hgb down to 8.5 from 9.3 this afternoon.  Patient and wife were concerned and asked that it be checked again prior to 6am.  Order received for recheck at midnight.  Hydrocortisone cream received for hemorrhoid.  Patient applied himself.  Up to bathroom independently and uses urinal.  Steady on feet.      R-(recommendations): Continue to monitor per POC.    "

## 2017-10-29 NOTE — PROGRESS NOTES
S-(situation): Patient discharged to home with spouse    B-(background): Observation goals met     A-(assessment): VSS, afebrile, RA, bruising with decrease edema around the scrotal area and tenderness around the hemorrhoids on the buttock with cream applied. Pain decreasing with medication and cream.    R-(recommendations): Discharge instructions reviewed with patient and spouse. Listed belongings gathered and returned to patient.  Patient Education resolved: Yes  New medications-Pt. Has been educated about reason of use and side effects Yes  Home and hospital acquired medications returned to patient Yes  Medication Bin checked and emptied on discharge Yes

## 2017-10-29 NOTE — DISCHARGE SUMMARY
[facility]    Discharge Summary  Hospitalist    Date of Admission:  10/28/2017  Date of Discharge:  10/29/2017  Discharging Provider: Danny Owens      Discharge Diagnoses     Renal hematoma    Retroperitoneal hematoma    * No resolved hospital problems. *      History of Present Illness   Quan Murphy is an 67 year old male  who presented with:    Copied from admission H&P:   67-year-old who presented to ED with complaining of hemorrhoids but was found to have a kidney hematoma and retroperitoneal hemorrhage. This likely occurred from shock wave lithotripsy 10 days ago, but was discovered just now. Review of the hospital discharge note showed that he had a syncopal, apneic, hypotensive event in the PACU. Since then home he's been feeling well. He had a low-grade fever. He was seen in the ER. He was admitted for observation trending of a hemoglobin to make sure this was not an acute bleed after discussion with the on-call urologist.    Hospital Course   Quan Murphy was admitted on 10/28/2017.  He was admitted observation. Initial hemoglobin was 9.3, subsequent hemoglobin the next day is 8.3. He did receive nearly a liter of fluids and I think is dilutional. Hemodynamically he is stable and at his baseline. His home medications were unchanged. He'll be discharged to home on pain medicine with oxycodone, iron supplementation, senna to keep his stools soft, and instructed on hemorrhoidal care at home. He is happy to be discharged. Follow up with his primary in one week, sooner if signs of bleeding develop we did discuss those in detail.    Danny Owens    Significant Results and Procedures   No procedures performed during this admission    Pending Results     Unresulted Labs Ordered in the Past 30 Days of this Admission     No orders found for last 61 day(s).             Primary Care Physician   Jose Hernandez    Physical Exam   198 lbs 6.62 oz  /83 (BP Location:  "Left arm)  Pulse 74  Temp 97.5  F (36.4  C) (Tympanic)  Resp 18  Ht 1.727 m (5' 8\")  Wt 90 kg (198 lb 6.6 oz)  SpO2 99%  BMI 30.17 kg/m2      Well-appearing male no distress. Heart rate appeared mostly related. Flank pain with mild tenderness. No ecchymosis noted. Extremity is warm and perfused no edema. Alert and oriented.    Discharge Disposition   Discharged to home  Condition at discharge: Stable    Consultations This Hospital Stay   None    Please carbon copy to Dr. Brian MD    Time Spent on this Encounter   I, Danny Owens, personally saw the patient today and spent less than or equal to 30 minutes discharging this patient.    Discharge Orders     Reason for your hospital stay   Active Problems:   Renal hematoma   Retroperitoneal hematoma       Follow-up and recommended labs and tests   Follow up with primary care provider, Jose Hernandez, within 1-2 wks, for hospital follow- up. The following labs/tests are recommended: hgb.     Activity   Your activity upon discharge: activity as tolerated     Diet   Follow this diet upon discharge: Orders Placed This Encounter     Regular Diet Adult         Discharge Medications   Current Discharge Medication List      START taking these medications    Details   !! oxyCODONE (ROXICODONE) 5 MG IR tablet Take 1 tablet (5 mg) by mouth every 4 hours as needed for pain maximum 4 tablet(s) per day  Qty: 18 tablet, Refills: 0    Associated Diagnoses: Kidney hematoma, unspecified laterality, initial encounter       !! - Potential duplicate medications found. Please discuss with provider.      CONTINUE these medications which have NOT CHANGED    Details   !! oxyCODONE (ROXICODONE) 5 MG IR tablet Take 1 tablet (5 mg) by mouth every 4 hours as needed for pain maximum 6 tablet(s) per day  Qty: 18 tablet, Refills: 0    Associated Diagnoses: Renal stones      hydrOXYzine (VISTARIL) 25 MG capsule Take 1 capsule (25 mg) by mouth 3 times daily as needed for anxiety  Qty: 60 " capsule, Refills: 0    Associated Diagnoses: Arthropathy      lisinopril (PRINIVIL/ZESTRIL) 5 MG tablet Take 1 tablet (5 mg) by mouth daily  Qty: 90 tablet, Refills: 3    Associated Diagnoses: Hypertension goal BP (blood pressure) < 140/90      ALPRAZolam (XANAX) 0.5 MG tablet Take 1 tablet (0.5 mg) by mouth 3 times daily as needed for anxiety  Qty: 90 tablet, Refills: 1    Associated Diagnoses: Anxiety      citalopram (CELEXA) 40 MG tablet Take 1 tablet (40 mg) by mouth daily  Qty: 30 tablet, Refills: 6    Associated Diagnoses: Anxiety      zolpidem (AMBIEN) 10 MG tablet Take 1 tablet (10 mg) by mouth nightly as needed for sleep  Qty: 30 tablet, Refills: 5    Associated Diagnoses: 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 If symptoms persist 5 minutes after 1st dose call 911.  Qty: 25 tablet, Refills: 0    Associated Diagnoses: Atherosclerosis of native coronary artery of native heart with unstable angina pectoris (H)      CIALIS 20 MG tablet Take 20 mg by mouth daily as needed        !! - Potential duplicate medications found. Please discuss with provider.      STOP taking these medications       HYDROcodone-acetaminophen (NORCO) 7.5-325 MG per tablet Comments:   Reason for Stopping:         clopidogrel (PLAVIX) 75 MG tablet Comments:   Reason for Stopping:         aspirin 81 MG EC tablet Comments:   Reason for Stopping:             Allergies   Allergies   Allergen Reactions     Animal Dander      Dust Mites      Pollen Extract      Smoke.      Data   Most Recent 3 CBC's:  Recent Labs   Lab Test  10/29/17   0515  10/29/17   0001  10/28/17    1845  10/28/17   1210  10/16/17   1631   07/14/17   1923   WBC   --    --    --   6.7  4.8   --   6.0   HGB  8.3*  8.6*  8.5*  9.3*  13.5   < >  13.8   MCV   --    --    --   88  90   --   87   PLT   --    --    --   450  170   --   174    < > = values in this interval not displayed.      Most Recent 3 BMP's:  Recent Labs   Lab Test  10/28/17   1210  10/19/17   0720  10/18/17   2004  10/07/17   1009   07/14/17   1923   NA  137  136   --   144   --   141   POTASSIUM  4.2  5.0   --   4.5   < >  3.5   CHLORIDE  104  104   --   108   --   106   CO2  24  23   --   26   --   22   BUN  15  25   --   13   --   19   CR  1.15  2.51*  1.67*  1.00   < >  1.17   ANIONGAP  9   --    --   10   --   13   LATRELL  9.1  8.1*   --   9.1   --   8.6   GLC  99  118*   --   97   --   83    < > = values in this interval not displayed.     Most Recent 2 LFT's:  Recent Labs   Lab Test  10/28/17   1210  10/07/17   1009  12/23/16   2130   AST  31   --   28   ALT  22  32  45   ALKPHOS  76   --   72   BILITOTAL  1.7*   --   1.1     Most Recent INR's and Anticoagulation Dosing History:  Anticoagulation Dose History     Recent Dosing and Labs Latest Ref Rng & Units 11/17/2005 10/28/2017    INR 0.86 - 1.14 0.99 1.08        Most Recent 3 Troponin's:  Recent Labs   Lab Test  12/24/16   1020  12/24/16   0325  12/23/16   2130   TROPI  <0.015  The 99th percentile for upper reference range is 0.045 ug/L.  Troponin values in   the range of 0.045 - 0.120 ug/L may be associated with risks of adverse   clinical events.    <0.015  The 99th percentile for upper reference range is 0.045 ug/L.  Troponin values in   the range of 0.045 - 0.120 ug/L may be associated with risks of adverse   clinical events.    <0.015  The 99th percentile for upper reference range is 0.045 ug/L.  Troponin values in   the range of 0.045 - 0.120 ug/L may be associated with risks of adverse   clinical events.       Most Recent Cholesterol Panel:  Recent Labs   Lab Test  10/07/17   1006    CHOL  177   LDL  76   HDL  87   TRIG  68     Most Recent 6 Bacteria Isolates From Any Culture (See EPIC Reports for Culture Details):No lab results found.  Most Recent TSH, T4 and A1c Labs:  Recent Labs   Lab Test  10/20/11   1003   TSH  4.33

## 2017-10-30 ENCOUNTER — TELEPHONE (OUTPATIENT)
Dept: INTERNAL MEDICINE | Facility: CLINIC | Age: 67
End: 2017-10-30

## 2017-10-30 NOTE — TELEPHONE ENCOUNTER
Inpatient Visit Date: 10/29/17  Diagnosis / Reason for Visit: Kidney Hematoma, Unspecified Laterality, Initial Encounter

## 2017-11-01 ENCOUNTER — OFFICE VISIT (OUTPATIENT)
Dept: FAMILY MEDICINE | Facility: CLINIC | Age: 67
End: 2017-11-01
Payer: MEDICARE

## 2017-11-01 VITALS
BODY MASS INDEX: 29.92 KG/M2 | DIASTOLIC BLOOD PRESSURE: 66 MMHG | HEART RATE: 104 BPM | HEIGHT: 68 IN | SYSTOLIC BLOOD PRESSURE: 100 MMHG | WEIGHT: 197.4 LBS | OXYGEN SATURATION: 100 % | TEMPERATURE: 98.2 F

## 2017-11-01 DIAGNOSIS — D50.0 IRON DEFICIENCY ANEMIA DUE TO CHRONIC BLOOD LOSS: ICD-10-CM

## 2017-11-01 DIAGNOSIS — M12.9 ARTHROPATHY: ICD-10-CM

## 2017-11-01 DIAGNOSIS — S37.019A KIDNEY HEMATOMA, UNSPECIFIED LATERALITY, INITIAL ENCOUNTER: Primary | ICD-10-CM

## 2017-11-01 LAB
ERYTHROCYTE [DISTWIDTH] IN BLOOD BY AUTOMATED COUNT: 14.4 % (ref 10–15)
HCT VFR BLD AUTO: 30.7 % (ref 40–53)
HGB BLD-MCNC: 9.9 G/DL (ref 13.3–17.7)
MCH RBC QN AUTO: 28.5 PG (ref 26.5–33)
MCHC RBC AUTO-ENTMCNC: 32.2 G/DL (ref 31.5–36.5)
MCV RBC AUTO: 89 FL (ref 78–100)
PLATELET # BLD AUTO: 521 10E9/L (ref 150–450)
RBC # BLD AUTO: 3.47 10E12/L (ref 4.4–5.9)
WBC # BLD AUTO: 9.2 10E9/L (ref 4–11)

## 2017-11-01 PROCEDURE — 99214 OFFICE O/P EST MOD 30 MIN: CPT | Performed by: FAMILY MEDICINE

## 2017-11-01 PROCEDURE — 36415 COLL VENOUS BLD VENIPUNCTURE: CPT | Performed by: FAMILY MEDICINE

## 2017-11-01 PROCEDURE — 85027 COMPLETE CBC AUTOMATED: CPT | Performed by: FAMILY MEDICINE

## 2017-11-01 RX ORDER — HYDROXYZINE PAMOATE 25 MG/1
25 CAPSULE ORAL 3 TIMES DAILY PRN
Qty: 60 CAPSULE | Refills: 0 | Status: SHIPPED | OUTPATIENT
Start: 2017-11-01 | End: 2019-03-15

## 2017-11-01 RX ORDER — OXYCODONE HYDROCHLORIDE 5 MG/1
5 TABLET ORAL EVERY 4 HOURS PRN
Qty: 20 TABLET | Refills: 0 | Status: SHIPPED | OUTPATIENT
Start: 2017-11-01 | End: 2020-01-12

## 2017-11-01 NOTE — PROGRESS NOTES
SUBJECTIVE:   Quan Murphy is a 67 year old male who presents to clinic today for the following health issues:  Patient had a lithotripsy 10/18/2017 and unfortunately had some kidney bleeding. He was then kept over night and released. He then got home and started to notice some bruising in his groin and ended up in the ED over night 10/28/2017. His wife wants an INR done. Patient also c/o hemorrhoids . Had a soft BM this AM. He is on iron pills and pain meds at the moment. Patient would also like to know when he is to start plavix again.     ED/UC Followup:    Facility:  [facility]   Date of visit: 10/28/2017  Reason for visit: Kidney hematoma  Current Status: follow up              Problem list and histories reviewed & adjusted, as indicated.  Additional history: as documented        Reviewed and updated as needed this visit by clinical staff       Reviewed and updated as needed this visit by Provider        SUBJECTIVE:  Quan  is a 67 year old male who presents for:  Above his lithotripsy. He had a very isabel course after this. Developed some bleeding and developed hematoma in the kidney. Had some bleeding into his groin area. He was actually kept overnight in the hospital after an emergency room visit 10 days after this. Hemoglobin dropped down to 8.5. Prior to this procedure he was on Plavix and aspirin because of a stent placed last December. They were stopped before the procedure. He questions if he needs to start back up on these again.    Past Medical History:   Diagnosis Date     Allergy, unspecified not elsewhere classified     Seasonal allergies, pollen, dust, smoke and animals     Antiplatelet or antithrombotic long-term use      Anxiety      Arthritis      Chest pain      Chronic sinusitis      Coronary atherosclerosis of unspecified type of vessel, native or graft     Coronary artery disease     Hyperlipidemia      Hypertension      Inguinal hernia      Kidney stones      Malignant neoplasm of prostate  (H)     Prostate cancer     Prostate cancer (H)      Past Surgical History:   Procedure Laterality Date     ARTHRODESIS FOOT  7/23/2013    Procedure: ARTHRODESIS FOOT;  Great Toe Arthrodesis Left Foot;  Surgeon: Ash Gonzalez DPM;  Location: [facility] OR     ARTHRODESIS FOOT  6/10/2014    Procedure: ARTHRODESIS FOOT;  Surgeon: Ash Gonzalez DPM;  Location: [facility] OR     C LAPAROSCOPY, SURGICAL PROSTATECTOMY, RETROPUBIC RADICAL, W/NERVE SPARING  11/30/2004    With full bilateral pelvic lymphadenectomy.  F-[facility].     C TOTAL KNEE ARTHROPLASTY  05/01/08    Left knee     COLONOSCOPY  10/7/2013    Procedure: COLONOSCOPY;  Colonoscopy;  Surgeon: Mike Fallon MD;  Location: [facility] GI     EXTRACORPOREAL SHOCK WAVE LITHOTRIPSY (ESWL) Bilateral 10/18/2017    Procedure: EXTRACORPOREAL SHOCK WAVE LITHOTRIPSY (ESWL);  BILATERAL EXTRACORPOREAL SHOCKWAVE LITHOTRIPSY ;  Surgeon: Meir Torres MD;  Location: [facility] OR     HC CORRECT BUNION,SIMPLE  08/11/2005    x3     HC REMV TOE BENIGN BONE LESN  08/11/2005     HERNIORRHAPHY INGUINAL  7/3/2013    Procedure: HERNIORRHAPHY INGUINAL;  Open Repair Inguinal hernia Right with mesh ;  Surgeon: Sam Escobar MD;  Location: [facility] OR     MOHS MICROGRAPHIC PROCEDURE  08/23/11    ear and chin-[facility]     OPEN REDUCTION INTERNAL FIXATION WRIST Right 7/18/2017    Procedure: OPEN REDUCTION INTERNAL FIXATION WRIST;  Right distal radius open reduction and internal fixation;  Surgeon: Pedro Blanca DO;  Location: [facility]     RECONSTRUCT FOREFOOT WITH METATARSOPHALANGEAL (MTP) FUSION  6/10/2014    Procedure: RECONSTRUCT FOREFOOT WITH METATARSOPHALANGEAL (MTP) FUSION;  Surgeon: Ash Gonzalez DPM;  Location: [facility]     STENT, CORONARY, DEMI       SURGICAL HISTORY OF -   1999/1974    lt knee     SURGICAL HISTORY OF -   10/2004    lithotripsy     SURGICAL HISTORY OF - 11/05    angiogram with stent     Social History   Substance Use Topics     Smoking status: Never  "Smoker     Smokeless tobacco: Never Used     Alcohol use 5.0 oz/week     10 Cans of beer per week      Comment: occasional      Current Outpatient Prescriptions   Medication Sig Dispense Refill     hydrOXYzine (VISTARIL) 25 MG capsule Take 1 capsule (25 mg) by mouth 3 times daily as needed for anxiety 60 capsule 0     oxyCODONE (ROXICODONE) 5 MG IR tablet Take 1 tablet (5 mg) by mouth every 4 hours as needed for pain maximum 4 tablet(s) per day 20 tablet 0     lisinopril (PRINIVIL/ZESTRIL) 5 MG tablet Take 1 tablet (5 mg) by mouth daily 90 tablet 3     citalopram (CELEXA) 40 MG tablet Take 1 tablet (40 mg) by mouth daily 30 tablet 6     rosuvastatin (CRESTOR) 40 MG tablet Take 1 tablet (40 mg) by mouth daily 90 tablet 3     nitroglycerin (NITROSTAT) 0.4 MG sublingual tablet For chest pain place 1 tablet under the tongue every 5 minutes for 3 doses. If symptoms persist 5 minutes after 1st dose call 911. 25 tablet 0     cetirizine (ZYRTEC) 10 MG tablet Take 10 mg by mouth daily       CIALIS 20 MG tablet Take 20 mg by mouth daily as needed        azithromycin (ZITHROMAX) 250 MG tablet Two tablets first day, then one tablet daily for four days. 6 tablet 0     zolpidem (AMBIEN) 10 MG tablet Take 1 tablet (10 mg) by mouth nightly as needed for sleep 30 tablet 5     HYDROcodone-acetaminophen (NORCO) 7.5-325 MG per tablet Take 1 tablet by mouth every 6 hours as needed for moderate to severe pain maximum 4 tablet(s) per day 120 tablet 0     ALPRAZolam (XANAX) 0.5 MG tablet Take 1 tablet (0.5 mg) by mouth 3 times daily as needed for anxiety 90 tablet 0       REVIEW OF SYSTEMS:   5 point ROS negative except as noted above in HPI, including Gen., Resp, CV, GI &  system review.     OBJECTIVE:  Vitals: /66 (BP Location: Right arm, Patient Position: Chair, Cuff Size: Adult Large)  Pulse 104  Temp 98.2  F (36.8  C) (Tympanic)  Ht 5' 8\" (1.727 m)  Wt 197 lb 6.4 oz (89.5 kg)  SpO2 100%  BMI 30.01 kg/m2  BMI= Body mass " index is 30.01 kg/(m^2).  He is alert and oriented. In mild discomfort. Pale appearing. Neck is supple. Lungs are clear. Heart regular rhythm rate in the 80s. Abdomen is a little tender but bowel sounds are present no masses no distention. Extremities normal. Skin clear. Hemoglobin is up to 9.9.    ASSESSMENT:  #1 kidney hematoma status post lithotripsy #2 iron deficiency anemia secondary to above #3 coronary artery disease status post stent placement    PLAN:  Repeat hemoglobin in a couple of weeks. He seems to be getting better from this. Consulted cardiology regarding the Plavix and he said he could remain off this. This message was relayed to him. We'll continue to follow him. He will be seen by urology as well. He's had an uptake in his pain medication usage and we will watch this closely and try to wean him back down again.        Jose Hernandez MD  [facility]

## 2017-11-01 NOTE — PROGRESS NOTES
"  SUBJECTIVE:   Quan Murphy is a 67 year old male who presents to clinic today for the following health issues:  Patient is in clinic to follow up from his lithotripsy    Post op lithotripsy    {additional problems for provider to add:936221}    Problem list and histories reviewed & adjusted, as indicated.  Additional history: {NONE - AS DOCUMENTED:635643::\"as documented\"}    {HIST REVIEW/ LINKS 2:140644}    Reviewed and updated as needed this visit by clinical staff       Reviewed and updated as needed this visit by Provider         {PROVIDER CHARTING PREFERENCE:761225}    "

## 2017-11-01 NOTE — NURSING NOTE
"Chief Complaint   Patient presents with     RECHECK     Post op lithotripsy      RECHECK     ED visit        Initial /66 (BP Location: Right arm, Patient Position: Chair, Cuff Size: Adult Large)  Pulse 104  Temp 98.2  F (36.8  C) (Tympanic)  Ht 5' 8\" (1.727 m)  Wt 197 lb 6.4 oz (89.5 kg)  SpO2 100%  BMI 30.01 kg/m2 Estimated body mass index is 30.01 kg/(m^2) as calculated from the following:    Height as of this encounter: 5' 8\" (1.727 m).    Weight as of this encounter: 197 lb 6.4 oz (89.5 kg).  Medication Reconciliation: complete    "

## 2017-11-01 NOTE — MR AVS SNAPSHOT
After Visit Summary   11/1/2017    Quan Murphy    MRN: [id number]           Patient Information     Date Of Birth          1950        Visit Information        Provider Department      11/1/2017 2:00 PM Jose Hernandez MD [facility]        Today's Diagnoses     Kidney hematoma, unspecified laterality, initial encounter    -  1    Iron deficiency anemia due to chronic blood loss        Arthropathy           Follow-ups after your visit        Your next 10 appointments already scheduled     Nov 27, 2017  1:15 PM CST   XR KUB with PHXR1   [facility] ([facility])    [address] [place] [address] [id number] [phone]           Please bring a list of your current medicines to your exam. (Include vitamins, minerals and over-thecounter medicines.) Leave your valuables at home.  Tell your doctor if there is a chance you may be pregnant.  You do not need to do anything special for this exam.              Who to contact     If you have questions or need follow up information about today's clinic visit or your schedule please contact [facility] directly at [phone].  Normal or non-critical lab and imaging results will be communicated to you by [employer], letter or phone within 4 business days after the clinic has received the results. If you do not hear from us within 7 days, please contact the clinic through [employer] or phone. If you have a critical or abnormal lab result, we will notify you by phone as soon as possible.  Submit refill requests through [employer] or call your pharmacy and they will forward the refill request to us. Please allow 3 business days for your refill to be completed.          Additional Information About Your Visit        [employer] Information     [employer] gives you secure access to your electronic health record. If you see a primary care provider, you can also send messages to your care team and make  "appointments. If you have questions, please call your primary care clinic.  If you do not have a primary care provider, please call [phone] and they will assist you.        Care EveryWhere ID     This is your Care EveryWhere ID. This could be used by other organizations to access your [facility] medical records  [id number]        Your Vitals Were     Pulse Temperature Height Pulse Oximetry BMI (Body Mass Index)       104 98.2  F (36.8  C) (Tympanic) 5' 8\" (1.727 m) 100% 30.01 kg/m2        Blood Pressure from Last 3 Encounters:   11/01/17 100/66   10/29/17 138/83   10/19/17 147/80    Weight from Last 3 Encounters:   11/01/17 197 lb 6.4 oz (89.5 kg)   10/28/17 198 lb 6.6 oz (90 kg)   10/18/17 197 lb 6 oz (89.5 kg)              We Performed the Following     CBC with platelets          Where to get your medicines      These medications were sent to [facility] [address]   [address] [place] [address] , [facility] [id number]     Phone:  [phone]     hydrOXYzine 25 MG capsule         Some of these will need a paper prescription and others can be bought over the counter.  Ask your nurse if you have questions.     Bring a paper prescription for each of these medications     oxyCODONE IR 5 MG tablet          Primary Care Provider Office Phone # Fax #    Jose Hernandez [phone]       [address]   [facility] [address]        Equal Access to Services     MAGGIE HARRIS AH: Hadii aad ku hadasho Soelainaali, waaxda luqadaha, qaybta kaalmada adeegyada, mitchell cox. So [facility] [phone].    ATENCIÓN: Si habla español, tiene a recinos disposición servicios gratuitos de asistencia lingüística. Llame al [phone].    We comply with applicable federal civil rights laws and [place] laws. We do not discriminate on the basis of race, color, national origin, age, disability, sex, sexual orientation, or gender identity.          "   Thank you!     Thank you for choosing [facility]  for your care. Our goal is always to provide you with excellent care. Hearing back from our patients is one way we can continue to improve our services. Please take a few minutes to complete the written survey that you may receive in the mail after your visit with us. Thank you!             Your Updated Medication List - Protect others around you: Learn how to safely use, store and throw away your medicines at www.disposemymeds.org.          This list is accurate as of: 11/1/17 11:59 PM.  Always use your most recent med list.                   Brand Name Dispense Instructions for use Diagnosis    cetirizine 10 MG tablet    zyrTEC     Take 10 mg by mouth daily        CIALIS 20 MG tablet   Generic drug:  tadalafil      Take 20 mg by mouth daily as needed        citalopram 40 MG tablet    celeXA    30 tablet    Take 1 tablet (40 mg) by mouth daily    Anxiety       hydrOXYzine 25 MG capsule    VISTARIL    60 capsule    Take 1 capsule (25 mg) by mouth 3 times daily as needed for anxiety    Arthropathy       lisinopril 5 MG tablet    PRINIVIL/ZESTRIL    90 tablet    Take 1 tablet (5 mg) by mouth daily    Hypertension goal BP (blood pressure) < 140/90       nitroGLYcerin 0.4 MG sublingual tablet    NITROSTAT    25 tablet    For chest pain place 1 tablet under the tongue every 5 minutes for 3 doses. If symptoms persist 5 minutes after 1st dose call 911.    Atherosclerosis of native coronary artery of native heart with unstable angina pectoris (H)       oxyCODONE IR 5 MG tablet    ROXICODONE    20 tablet    Take 1 tablet (5 mg) by mouth every 4 hours as needed for pain maximum 4 tablet(s) per day    Kidney hematoma, unspecified laterality, initial encounter       rosuvastatin 40 MG tablet    CRESTOR    90 tablet    Take 1 tablet (40 mg) by mouth daily    Hyperlipidemia LDL goal <130

## 2017-11-03 ENCOUNTER — MYC REFILL (OUTPATIENT)
Dept: FAMILY MEDICINE | Facility: CLINIC | Age: 67
End: 2017-11-03

## 2017-11-03 ENCOUNTER — MYC MEDICAL ADVICE (OUTPATIENT)
Dept: FAMILY MEDICINE | Facility: CLINIC | Age: 67
End: 2017-11-03

## 2017-11-03 DIAGNOSIS — F41.9 ANXIETY: ICD-10-CM

## 2017-11-03 RX ORDER — ALPRAZOLAM 0.5 MG
0.5 TABLET ORAL 3 TIMES DAILY PRN
Qty: 90 TABLET | Refills: 0 | Status: SHIPPED | OUTPATIENT
Start: 2017-11-03 | End: 2017-12-03

## 2017-11-03 NOTE — TELEPHONE ENCOUNTER
Xanax - Patient is aware he is not due for this until 11/8/17      Last Written Prescription Date: 9/8/17  Last Fill Quantity: 90,  # refills: 1   Last Office Visit with [facility], [facility] or [facility] prescribing provider: 10/16/17

## 2017-11-03 NOTE — TELEPHONE ENCOUNTER
Message from MyChart:  Original authorizing provider: MD Quan Dean would like a refill of the following medications:  ALPRAZolam (XANAX) 0.5 MG tablet [Jose Hernandez MD]    Preferred pharmacy: [address]     Comment:  Please include one refill, as before. Thanks, New PARMAR

## 2017-11-04 ENCOUNTER — MYC MEDICAL ADVICE (OUTPATIENT)
Dept: FAMILY MEDICINE | Facility: CLINIC | Age: 67
End: 2017-11-04

## 2017-11-06 ENCOUNTER — MYC MEDICAL ADVICE (OUTPATIENT)
Dept: FAMILY MEDICINE | Facility: CLINIC | Age: 67
End: 2017-11-06

## 2017-11-06 DIAGNOSIS — J34.89 SINUS PRESSURE: Primary | ICD-10-CM

## 2017-11-06 NOTE — TELEPHONE ENCOUNTER
"Patient is reporting he has been feeling worn out and \"same old, same old\".  He states the family has been going through some major health issues and in the past and Kate seems to always give his body the strength it needs to keep going.  He is reporting the following symptoms for the past few days: low grade temp, but no actual reading, runny nose, sneezing, facial sinus pressure below his eyes that worsens with position change.      He states in the past if he has let things like this go, he ends up in the ED.  He would like this message sent to Dr. Carlisle in PCP's absence since he knows his history.  Routing to Dr. Carlisle for further advice.    Christa Chapman RN    Message handled by Nurse Triage with [facility] - provider name: Dr. Carlisle.  RN T'd up medication, cannot sign due to high medication reaction with citalopram.     "

## 2017-11-07 RX ORDER — AZITHROMYCIN 250 MG/1
TABLET, FILM COATED ORAL
Qty: 6 TABLET | Refills: 0 | Status: SHIPPED | OUTPATIENT
Start: 2017-11-07 | End: 2017-11-16

## 2017-11-07 RX ORDER — LEVOFLOXACIN 500 MG/1
500 TABLET, FILM COATED ORAL DAILY
Qty: 7 TABLET | Refills: 0 | Status: SHIPPED | OUTPATIENT
Start: 2017-11-07 | End: 2017-11-07 | Stop reason: SINTOL

## 2017-11-07 NOTE — TELEPHONE ENCOUNTER
Per RF- he has never been on levaquin and wants him on a z-pack instead. Will place order.  Monica Ley, [facility]

## 2017-11-07 NOTE — TELEPHONE ENCOUNTER
Pharmacy called and states that the levaquin that was sent in today can cause increased QT waves since he is on the celexa. Need to get the ok to fill med.  Christa Padgett MA

## 2017-11-09 ENCOUNTER — TELEPHONE (OUTPATIENT)
Dept: FAMILY MEDICINE | Facility: CLINIC | Age: 67
End: 2017-11-09

## 2017-11-09 NOTE — TELEPHONE ENCOUNTER
Per Dr. Carlisle he will not be refilling oxycodone for patient at this time.  Pt advised on MD notes as written and states understanding.   Pattie Patel CMA

## 2017-11-09 NOTE — TELEPHONE ENCOUNTER
Reason for Call:  Other prescription    Detailed comments: Pt has had issues after and since his lithotripsy procedure and is wondering if Dr Carlisle would be willing to refill his Oxycodone one more time for him in Dr Gracia absence.  [facility]    Phone Number Patient can be reached at: Home number on file [phone] (home)    Best Time: any    Can we leave a detailed message on this number? YES    Call taken on 11/9/2017 at 8:42 AM by Leisa Nava

## 2017-11-12 ENCOUNTER — MYC REFILL (OUTPATIENT)
Dept: FAMILY MEDICINE | Facility: CLINIC | Age: 67
End: 2017-11-12

## 2017-11-12 DIAGNOSIS — S37.019A KIDNEY HEMATOMA, UNSPECIFIED LATERALITY, INITIAL ENCOUNTER: ICD-10-CM

## 2017-11-12 DIAGNOSIS — G47.01 SLEEP DISORDER DUE TO A GENERAL MEDICAL CONDITION, INSOMNIA TYPE: ICD-10-CM

## 2017-11-12 RX ORDER — OXYCODONE HYDROCHLORIDE 5 MG/1
5 TABLET ORAL EVERY 4 HOURS PRN
Qty: 20 TABLET | Refills: 0 | Status: CANCELLED | OUTPATIENT
Start: 2017-11-12

## 2017-11-13 RX ORDER — HYDROCODONE BITARTRATE AND ACETAMINOPHEN 7.5; 325 MG/1; MG/1
1 TABLET ORAL EVERY 6 HOURS PRN
Qty: 120 TABLET | Refills: 0 | Status: SHIPPED | OUTPATIENT
Start: 2017-11-13 | End: 2017-12-10

## 2017-11-13 RX ORDER — ZOLPIDEM TARTRATE 10 MG/1
10 TABLET ORAL
Qty: 30 TABLET | Refills: 5 | Status: SHIPPED | OUTPATIENT
Start: 2017-11-13 | End: 2018-04-02

## 2017-11-13 NOTE — TELEPHONE ENCOUNTER
Message from [employer]:  Original authorizing provider: Jose Hernandez MD    Quan Murphy would like a refill of the following medications:  oxyCODONE (ROXICODONE) 5 MG IR tablet [Jose Hernandez MD]    Preferred pharmacy: [address]     Comment:  Rm - Requesting Norco refill, not oxycodone. 4/day vs. 5/day would work now. Please consider 4/day (120 vs. 100). Fighting sinus infection but back pain is finally letting up and internal bleeding seems to be getting absorbed now. Would so appreciate it if you could get final approval in on Monday. You're right. Getting hemoglobin back is going to take time. Read Felipe's report this a.m. No wonder I hurt so bad and bled inside. p.s. Need to be there for Pedro scott. Tuff times.

## 2017-11-13 NOTE — TELEPHONE ENCOUNTER
Message from MyChart:  Original authorizing provider: MD Quan Dean would like a refill of the following medications:  zolpidem (AMBIEN) 10 MG tablet [Jose Hernandez MD]    Preferred pharmacy: [address]     Comment:  Almost missed an important one. Thanks Mera

## 2017-11-16 ENCOUNTER — MYC MEDICAL ADVICE (OUTPATIENT)
Dept: FAMILY MEDICINE | Facility: CLINIC | Age: 67
End: 2017-11-16

## 2017-11-16 ENCOUNTER — MYC REFILL (OUTPATIENT)
Dept: FAMILY MEDICINE | Facility: CLINIC | Age: 67
End: 2017-11-16

## 2017-11-16 DIAGNOSIS — S37.019A: Primary | ICD-10-CM

## 2017-11-16 DIAGNOSIS — J34.89 SINUS PRESSURE: ICD-10-CM

## 2017-11-16 RX ORDER — AZITHROMYCIN 250 MG/1
TABLET, FILM COATED ORAL
Qty: 6 TABLET | Refills: 0 | Status: SHIPPED | OUTPATIENT
Start: 2017-11-16 | End: 2018-06-20

## 2017-11-16 NOTE — TELEPHONE ENCOUNTER
Message from [employer]:  Original authorizing provider: Hoang Carlisle MD, MD Quan Murphy would like a refill of the following medications:  azithromycin (ZITHROMAX) 250 MG tablet [Hoang Carlisle MD, MD]    Preferred pharmacy: [address]     Comment:  Rm, Sinus infection is not getting much better. Still coughing and blowing green and yellow. Asked Hoang for some Levaquin when you were on vacation, but he gave me a Z-Pac instead. Didn't do much. Hasn't in the past either. Confident I need something stronger. Being run down I'm sure doesn't help. Please consider and thank you one more time. New PARMAR

## 2017-11-27 ENCOUNTER — TRANSFERRED RECORDS (OUTPATIENT)
Dept: HEALTH INFORMATION MANAGEMENT | Facility: CLINIC | Age: 67
End: 2017-11-27

## 2017-11-27 ENCOUNTER — HOSPITAL ENCOUNTER (OUTPATIENT)
Dept: GENERAL RADIOLOGY | Facility: CLINIC | Age: 67
Discharge: HOME | End: 2017-11-27
Attending: UROLOGY | Admitting: UROLOGY
Payer: MEDICARE

## 2017-11-27 DIAGNOSIS — N20.0 RENAL STONES: ICD-10-CM

## 2017-11-27 DIAGNOSIS — S37.019A: ICD-10-CM

## 2017-11-27 LAB — HGB BLD-MCNC: 11.7 G/DL (ref 13.3–17.7)

## 2017-11-27 PROCEDURE — 85018 HEMOGLOBIN: CPT | Performed by: FAMILY MEDICINE

## 2017-11-27 PROCEDURE — 36415 COLL VENOUS BLD VENIPUNCTURE: CPT | Performed by: FAMILY MEDICINE

## 2017-11-27 PROCEDURE — 74010 XR KUB: CPT | Mod: TC

## 2017-12-03 ENCOUNTER — MYC REFILL (OUTPATIENT)
Dept: FAMILY MEDICINE | Facility: CLINIC | Age: 67
End: 2017-12-03

## 2017-12-03 DIAGNOSIS — F41.9 ANXIETY: ICD-10-CM

## 2017-12-04 RX ORDER — ALPRAZOLAM 0.5 MG
0.5 TABLET ORAL 3 TIMES DAILY PRN
Qty: 90 TABLET | Refills: 1 | Status: SHIPPED | OUTPATIENT
Start: 2017-12-04 | End: 2018-01-30

## 2017-12-04 NOTE — TELEPHONE ENCOUNTER
Message from [employer]:  Original authorizing provider: MD Quan Granado would like a refill of the following medications:  ALPRAZolam (XANAX) 0.5 MG tablet [Shiv Beckett MD]    Preferred pharmacy: [address]     Comment:  You were on vacation when last filled. Please include one refill, as before. p.s. Not sure, but hope all is well with you and your family. New PARMAR

## 2017-12-07 ENCOUNTER — MYC MEDICAL ADVICE (OUTPATIENT)
Dept: FAMILY MEDICINE | Facility: CLINIC | Age: 67
End: 2017-12-07

## 2017-12-07 DIAGNOSIS — R05.9 COUGH: Primary | ICD-10-CM

## 2017-12-07 RX ORDER — LEVOFLOXACIN 500 MG/1
500 TABLET, FILM COATED ORAL DAILY
Qty: 10 TABLET | Refills: 1 | Status: SHIPPED | OUTPATIENT
Start: 2017-12-07 | End: 2018-09-21

## 2017-12-07 RX ORDER — LEVOFLOXACIN 500 MG/1
500 TABLET, FILM COATED ORAL DAILY
Qty: 10 TABLET | Refills: 0 | COMMUNITY
Start: 2017-12-07 | End: 2017-12-07

## 2017-12-10 ENCOUNTER — MYC REFILL (OUTPATIENT)
Dept: FAMILY MEDICINE | Facility: CLINIC | Age: 67
End: 2017-12-10

## 2017-12-10 DIAGNOSIS — S37.019A KIDNEY HEMATOMA, UNSPECIFIED LATERALITY, INITIAL ENCOUNTER: ICD-10-CM

## 2017-12-10 DIAGNOSIS — G47.01 SLEEP DISORDER DUE TO A GENERAL MEDICAL CONDITION, INSOMNIA TYPE: ICD-10-CM

## 2017-12-10 RX ORDER — ZOLPIDEM TARTRATE 10 MG/1
10 TABLET ORAL
Qty: 30 TABLET | Refills: 5 | Status: CANCELLED | OUTPATIENT
Start: 2017-12-10

## 2017-12-11 RX ORDER — HYDROCODONE BITARTRATE AND ACETAMINOPHEN 7.5; 325 MG/1; MG/1
1 TABLET ORAL EVERY 6 HOURS PRN
Qty: 120 TABLET | Refills: 0 | Status: SHIPPED | OUTPATIENT
Start: 2017-12-11 | End: 2018-01-08

## 2017-12-11 NOTE — TELEPHONE ENCOUNTER
Message from MyChart:  Original authorizing provider: MD Quan Dean would like a refill of the following medications:     HYDROcodone-acetaminophen (NORCO) 7.5-325 MG per tablet [Jose Hernandez MD]    Preferred pharmacy: [address]     Comment:

## 2018-01-08 ENCOUNTER — MYC REFILL (OUTPATIENT)
Dept: FAMILY MEDICINE | Facility: CLINIC | Age: 68
End: 2018-01-08

## 2018-01-08 DIAGNOSIS — S37.019A KIDNEY HEMATOMA, UNSPECIFIED LATERALITY, INITIAL ENCOUNTER: ICD-10-CM

## 2018-01-08 RX ORDER — HYDROCODONE BITARTRATE AND ACETAMINOPHEN 7.5; 325 MG/1; MG/1
1 TABLET ORAL EVERY 6 HOURS PRN
Qty: 120 TABLET | Refills: 0 | Status: SHIPPED | OUTPATIENT
Start: 2018-01-08 | End: 2018-02-04

## 2018-01-08 NOTE — TELEPHONE ENCOUNTER
Message from MyChart:  Original authorizing provider: MD Quan Dean would like a refill of the following medications:  HYDROcodone-acetaminophen (NORCO) 7.5-325 MG per tablet [Jose Hernandez MD]    Preferred pharmacy: [address]     Comment:

## 2018-01-30 ENCOUNTER — MYC REFILL (OUTPATIENT)
Dept: FAMILY MEDICINE | Facility: CLINIC | Age: 68
End: 2018-01-30

## 2018-01-30 DIAGNOSIS — F41.9 ANXIETY: ICD-10-CM

## 2018-01-31 RX ORDER — ALPRAZOLAM 0.5 MG
0.5 TABLET ORAL 3 TIMES DAILY PRN
Qty: 90 TABLET | Refills: 1 | Status: SHIPPED | OUTPATIENT
Start: 2018-01-31 | End: 2018-03-28

## 2018-01-31 NOTE — TELEPHONE ENCOUNTER
Message from MyChart:  Original authorizing provider: MD Quan Dean would like a refill of the following medications:  ALPRAZolam (XANAX) 0.5 MG tablet [Jose Hernandez MD]    Preferred pharmacy: [address]     Comment:  thank you - chris acosta

## 2018-02-04 ENCOUNTER — MYC REFILL (OUTPATIENT)
Dept: FAMILY MEDICINE | Facility: CLINIC | Age: 68
End: 2018-02-04

## 2018-02-04 DIAGNOSIS — S37.019A KIDNEY HEMATOMA, UNSPECIFIED LATERALITY, INITIAL ENCOUNTER: ICD-10-CM

## 2018-02-05 RX ORDER — HYDROCODONE BITARTRATE AND ACETAMINOPHEN 7.5; 325 MG/1; MG/1
1 TABLET ORAL EVERY 6 HOURS PRN
Qty: 120 TABLET | Refills: 0 | Status: SHIPPED | OUTPATIENT
Start: 2018-02-05 | End: 2018-03-02

## 2018-02-05 NOTE — TELEPHONE ENCOUNTER
Message from MyChart:  Original authorizing provider: MD Quan Dean would like a refill of the following medications:  HYDROcodone-acetaminophen (NORCO) 7.5-325 MG per tablet [Jose Hernandez MD]    Preferred pharmacy: [address]     Comment:  Side Note: Rm - thanks for continuing to help Pedro. He can't say enough good things about you.

## 2018-02-08 NOTE — ADDENDUM NOTE
Encounter addended by: Mirna Whitehead OT on: 2/8/2018 10:33 AM<BR>     Actions taken: Episode resolved, Sign clinical note

## 2018-02-08 NOTE — PROGRESS NOTES
Outpatient Occupational Therapy Discharge Note     Patient: Quan Murphy  : 1950    Beginning/End Dates of Reporting Period:   to 18   The patient was seen for 8 skilled OT treatment sessions during this reporting period    Referring Provider: Dr Pedro Blanca    Therapy Diagnosis: ORIF radialis/dital effecting his functional use of the hand for BADL/IADLs.    Client Self Report:   The patient requested on OT unitl 17.  Patient has not contacted rehab to set any further OT appointments.  OT discharge at this time.    Objective Measurements:  Patient not available for discharge testing      Goals:   Patient not available for findings and details    Plan:  Discharge from therapy    Reason for Discharge: Patient chooses to discontinue therapy.  Patient has failed to schedule further appointments.  Medicare G-code: Patient did not attend a final scheduled session prior to discharge. Unable to determine discharge functional status.    Discharge Plan: Patient to continue home program.          Thank you for referring Quan To OT services to assist with improve functional use of the hands for daily tasks/activities    If you have any questions or concerns, please contact me at [phone].    Mirna Whitehead MA, OTR/L  [facility]

## 2018-02-09 ENCOUNTER — MYC REFILL (OUTPATIENT)
Dept: CARDIOLOGY | Facility: CLINIC | Age: 68
End: 2018-02-09

## 2018-02-09 DIAGNOSIS — E78.5 HYPERLIPIDEMIA LDL GOAL <130: ICD-10-CM

## 2018-02-09 RX ORDER — ROSUVASTATIN CALCIUM 40 MG/1
40 TABLET, COATED ORAL DAILY
Qty: 90 TABLET | Refills: 3 | Status: SHIPPED | OUTPATIENT
Start: 2018-02-09 | End: 2019-02-04

## 2018-02-09 NOTE — TELEPHONE ENCOUNTER
Message from [employer]:  Sona Cha RN Fri Feb 9, 2018 8:37 AM        ----- Message -----   From: Quan Murphy   Sent: 2/9/2018 8:23 AM   To: [facility] Heart Team 5  Subject: Medication Renewal Request     Original authorizing provider: MARCY MAZA CNP would like a refill of the following medications:  rosuvastatin (CRESTOR) 40 MG tablet [MARCY MAZA CNP]    Preferred pharmacy: [address]     Comment:

## 2018-02-25 DIAGNOSIS — I10 BENIGN ESSENTIAL HYPERTENSION: Primary | ICD-10-CM

## 2018-02-26 NOTE — TELEPHONE ENCOUNTER
"Requested Prescriptions   Pending Prescriptions Disp Refills     aspirin 81 MG EC tablet 90 tablet 3     Sig: Take 1 tablet (81 mg) by mouth daily    Analgesics (Non-Narcotic Tylenol and ASA Only) Passed    2/25/2018 10:20 AM       Passed - Recent or future visit with authorizing provider's specialty    Patient had office visit in the last year or has a visit in the next 30 days with authorizing provider.  See \"Patient Info\" tab in inbasket, or \"Choose Columns\" in Meds & Orders section of the refill encounter.            Passed - Patient is age 20 years or older    If ASA is flagged for ages under 20 years old. Forward to provider for confirmation Ryes Syndrome is not a concern.              Last Written Prescription Date:  1/10/2017 D/C 10/29/2017  Last Fill Quantity: 90,  # refills: 3   Last office visit: 11/1/2017 with prescribing provider:  11/1/2017   Future Office Visit:      "

## 2018-03-02 ENCOUNTER — MYC REFILL (OUTPATIENT)
Dept: FAMILY MEDICINE | Facility: CLINIC | Age: 68
End: 2018-03-02

## 2018-03-02 DIAGNOSIS — F41.9 ANXIETY: ICD-10-CM

## 2018-03-02 DIAGNOSIS — S37.019A KIDNEY HEMATOMA, UNSPECIFIED LATERALITY, INITIAL ENCOUNTER: ICD-10-CM

## 2018-03-02 RX ORDER — HYDROCODONE BITARTRATE AND ACETAMINOPHEN 7.5; 325 MG/1; MG/1
1 TABLET ORAL EVERY 6 HOURS PRN
Qty: 120 TABLET | Refills: 0 | Status: SHIPPED | OUTPATIENT
Start: 2018-03-02 | End: 2018-04-01

## 2018-03-02 RX ORDER — CITALOPRAM HYDROBROMIDE 40 MG/1
40 TABLET ORAL DAILY
Qty: 30 TABLET | Refills: 6 | Status: SHIPPED | OUTPATIENT
Start: 2018-03-02 | End: 2018-09-20

## 2018-03-02 NOTE — TELEPHONE ENCOUNTER
Message from MyChart:  Original authorizing provider: MD Quan Dean would like a refill of the following medications:  citalopram (CELEXA) 40 MG tablet [Jose Hernandez MD]  HYDROcodone-acetaminophen (NORCO) 7.5-325 MG per tablet [Jose Hernandez MD]    Preferred pharmacy: [address]     Comment:

## 2018-03-28 ENCOUNTER — MYC REFILL (OUTPATIENT)
Dept: FAMILY MEDICINE | Facility: CLINIC | Age: 68
End: 2018-03-28

## 2018-03-28 DIAGNOSIS — F41.9 ANXIETY: ICD-10-CM

## 2018-03-28 RX ORDER — ALPRAZOLAM 0.5 MG
0.5 TABLET ORAL 3 TIMES DAILY PRN
Qty: 90 TABLET | Refills: 1 | Status: SHIPPED | OUTPATIENT
Start: 2018-03-28 | End: 2018-05-25

## 2018-03-28 NOTE — TELEPHONE ENCOUNTER
Message from MyChart:  Original authorizing provider: MD Quan Dean would like a refill of the following medications:  ALPRAZolam (XANAX) 0.5 MG tablet [Jose Hernandez MD]    Preferred pharmacy: [address]     Comment:

## 2018-04-01 ENCOUNTER — MYC REFILL (OUTPATIENT)
Dept: FAMILY MEDICINE | Facility: CLINIC | Age: 68
End: 2018-04-01

## 2018-04-01 DIAGNOSIS — S37.019A KIDNEY HEMATOMA, UNSPECIFIED LATERALITY, INITIAL ENCOUNTER: ICD-10-CM

## 2018-04-02 ENCOUNTER — TELEPHONE (OUTPATIENT)
Dept: FAMILY MEDICINE | Facility: CLINIC | Age: 68
End: 2018-04-02

## 2018-04-02 ENCOUNTER — MYC REFILL (OUTPATIENT)
Dept: FAMILY MEDICINE | Facility: CLINIC | Age: 68
End: 2018-04-02

## 2018-04-02 DIAGNOSIS — G47.01 SLEEP DISORDER DUE TO A GENERAL MEDICAL CONDITION, INSOMNIA TYPE: ICD-10-CM

## 2018-04-02 RX ORDER — HYDROCODONE BITARTRATE AND ACETAMINOPHEN 7.5; 325 MG/1; MG/1
1 TABLET ORAL EVERY 6 HOURS PRN
Qty: 120 TABLET | Refills: 0 | Status: SHIPPED | OUTPATIENT
Start: 2018-04-02 | End: 2018-04-30

## 2018-04-02 NOTE — TELEPHONE ENCOUNTER
Prior Authorization Retail Medication Request    Medication/Dose: ambien  ICD code (if different than what is on RX):     Previously Tried and Failed:     Rationale:       Insurance Name:  Medicare part d  Insurance ID:  [id number]      Pharmacy Information (if different than what is on RX)  Name:     Phone:

## 2018-04-02 NOTE — TELEPHONE ENCOUNTER
Message from MyChart:  Original authorizing provider: MD Quan Dean would like a refill of the following medications:  HYDROcodone-acetaminophen (NORCO) 7.5-325 MG per tablet [Jose Hernandez MD]    Preferred pharmacy: [address]     Comment:

## 2018-04-03 RX ORDER — ZOLPIDEM TARTRATE 10 MG/1
10 TABLET ORAL
Qty: 30 TABLET | Refills: 5 | Status: SHIPPED | OUTPATIENT
Start: 2018-04-03 | End: 2018-04-30

## 2018-04-03 NOTE — TELEPHONE ENCOUNTER
"Message from amaysimt:  Original authorizing provider: MD Quan Dean would like a refill of the following medications:  zolpidem (AMBIEN) 10 MG tablet [Jose Hernandez MD]    Preferred pharmacy: [address]    Comment:  This has a refill on it but I have notified that it needs \"new approval\". If you can assist me in this regard, I would appreciate it very much. It's getting hard to keep up with all the regs. Thank you for your help in this matter. [place] Murphy  "

## 2018-04-04 NOTE — TELEPHONE ENCOUNTER
PA Initiation    Medication: ambien  Insurance Company: Silver [employer] Part D - Phone [phone] Fax [phone]  Pharmacy Filling the Rx: [address]   Filling Pharmacy Phone: [phone]  Filling Pharmacy Fax:    Start Date: 4/4/2018    THIS HAS BEEN SUBMITTED BY THE PRIOR-AUTHORIZATION TEAM. ANY QUESTIONS PLEASE CALL [phone]. THANK YOU

## 2018-04-04 NOTE — TELEPHONE ENCOUNTER
Prior Authorization Approval    Authorization Effective Date: 1/4/2018  Authorization Expiration Date: 4/4/2019  Medication: ambien APPROVED   Approved Dose/Quantity:   Reference #:     Insurance Company: Silver [employer] Part D - Phone [phone] Fax [phone]  Expected CoPay: $12.82     CoPay Card Available:      Foundation Assistance Needed:    Which Pharmacy is filling the prescription (Not needed for infusion/clinic administered): [facility] [address]   Pharmacy Notified: Yes  Patient Notified: Yes

## 2018-04-30 ENCOUNTER — MYC REFILL (OUTPATIENT)
Dept: FAMILY MEDICINE | Facility: CLINIC | Age: 68
End: 2018-04-30

## 2018-04-30 DIAGNOSIS — G47.01 SLEEP DISORDER DUE TO A GENERAL MEDICAL CONDITION, INSOMNIA TYPE: ICD-10-CM

## 2018-04-30 DIAGNOSIS — S37.019A KIDNEY HEMATOMA, UNSPECIFIED LATERALITY, INITIAL ENCOUNTER: ICD-10-CM

## 2018-04-30 RX ORDER — HYDROCODONE BITARTRATE AND ACETAMINOPHEN 7.5; 325 MG/1; MG/1
1 TABLET ORAL EVERY 6 HOURS PRN
Qty: 120 TABLET | Refills: 0 | Status: SHIPPED | OUTPATIENT
Start: 2018-04-30 | End: 2018-05-29

## 2018-04-30 RX ORDER — ZOLPIDEM TARTRATE 10 MG/1
10 TABLET ORAL
Qty: 30 TABLET | Refills: 5 | Status: SHIPPED | OUTPATIENT
Start: 2018-04-30 | End: 2018-05-29

## 2018-05-07 ENCOUNTER — TELEPHONE (OUTPATIENT)
Dept: FAMILY MEDICINE | Facility: CLINIC | Age: 68
End: 2018-05-07

## 2018-05-07 ENCOUNTER — MYC MEDICAL ADVICE (OUTPATIENT)
Dept: FAMILY MEDICINE | Facility: CLINIC | Age: 68
End: 2018-05-07

## 2018-05-07 NOTE — TELEPHONE ENCOUNTER
----- Message from Quan Murphy sent at 5/5/2018 10:52 AM CDT -----  Regarding: Appointment Request ()  Contact: [phone]  Appointment Request From: Quan Murphy    With Provider: Jose Hernandez MD [-Primary Care Physician-]    Preferred Date Range: Any    Preferred Times: Any    Reason: To address the following health maintenance concerns.  Advance Directive Planning Q5 Yrs    Comments:  Please note for your records that this was done in the fall of 2017.

## 2018-05-07 NOTE — TELEPHONE ENCOUNTER
Per Dr. Hernandez, patient needs to be seen. Left message for patient to return call. Please assist in scheduling either in an acute spot with Dr. Hernandez on Wednesday, or with a different provider if he wants to be seen sooner.

## 2018-05-09 ENCOUNTER — OFFICE VISIT (OUTPATIENT)
Dept: FAMILY MEDICINE | Facility: CLINIC | Age: 68
End: 2018-05-09
Payer: MEDICARE

## 2018-05-09 VITALS
BODY MASS INDEX: 30.36 KG/M2 | RESPIRATION RATE: 18 BRPM | TEMPERATURE: 97 F | OXYGEN SATURATION: 98 % | HEIGHT: 69 IN | HEART RATE: 74 BPM | DIASTOLIC BLOOD PRESSURE: 58 MMHG | SYSTOLIC BLOOD PRESSURE: 106 MMHG | WEIGHT: 205 LBS

## 2018-05-09 DIAGNOSIS — H60.502 ACUTE OTITIS EXTERNA OF LEFT EAR, UNSPECIFIED TYPE: Primary | ICD-10-CM

## 2018-05-09 PROCEDURE — 99213 OFFICE O/P EST LOW 20 MIN: CPT | Performed by: FAMILY MEDICINE

## 2018-05-09 RX ORDER — NEOMYCIN SULFATE, POLYMYXIN B SULFATE AND HYDROCORTISONE 10; 3.5; 1 MG/ML; MG/ML; [USP'U]/ML
4 SUSPENSION/ DROPS AURICULAR (OTIC) 4 TIMES DAILY
Qty: 10 ML | Refills: 0 | Status: SHIPPED | OUTPATIENT
Start: 2018-05-09 | End: 2018-07-22

## 2018-05-09 ASSESSMENT — PAIN SCALES - GENERAL: PAINLEVEL: NO PAIN (0)

## 2018-05-09 NOTE — PROGRESS NOTES
SUBJECTIVE:   Quan Murphy is a 67 year old male who presents to clinic today for the following health issues:      Acute Illness   Acute illness concerns: Ear infection possible  Onset: 6 days ago 5/3/18    Fever: no    Chills/Sweats: YES- sweats at night    Headache (location?): no    Sinus Pressure:no    Conjunctivitis:  no    Ear Pain: YES- left     Rhinorrhea: no    Congestion: no    Sore Throat: no     Cough: no    Wheeze: no    Decreased Appetite: no    Nausea: YES- little bit lately usually mornings    Vomiting: no    Diarrhea:  no    Dysuria/Freq.: no    Fatigue/Achiness: YES- fatigue    Sick/Strep Exposure: no      Therapies Tried and outcome: over the counter wax products           Problem list and histories reviewed & adjusted, as indicated.  Additional history: as documented        Reviewed and updated as needed this visit by clinical staff  Tobacco  Allergies  Meds  Med Hx  Surg Hx  Fam Hx  Soc Hx      Reviewed and updated as needed this visit by Provider        SUBJECTIVE:  Quna  is a 67 year old male who presents for: On and off draining from his left ear.  Not really any pain.  This is been going on for about a week.  States he had one episode of night sweats but no fevers.  No congestion.    Past Medical History:   Diagnosis Date     Allergy, unspecified not elsewhere classified     Seasonal allergies, pollen, dust, smoke and animals     Antiplatelet or antithrombotic long-term use      Anxiety      Arthritis      Chest pain      Chronic sinusitis      Coronary atherosclerosis of unspecified type of vessel, native or graft     Coronary artery disease     Hyperlipidemia      Hypertension      Inguinal hernia      Kidney stones      Malignant neoplasm of prostate (H)     Prostate cancer     Prostate cancer (H)      Past Surgical History:   Procedure Laterality Date     ARTHRODESIS FOOT  7/23/2013    Procedure: ARTHRODESIS FOOT;  Great Toe Arthrodesis Left Foot;  Surgeon: Ash Gonzalez  LUIS Champion;  Location: [facility] OR     ARTHRODESIS FOOT  6/10/2014    Procedure: ARTHRODESIS FOOT;  Surgeon: Ash Gonzalez DPM;  Location: [facility] OR     C LAPAROSCOPY, SURGICAL PROSTATECTOMY, RETROPUBIC RADICAL, W/NERVE SPARING  11/30/2004    With full bilateral pelvic lymphadenectomy.  F-[facility].     C TOTAL KNEE ARTHROPLASTY  05/01/08    Left knee     COLONOSCOPY  10/7/2013    Procedure: COLONOSCOPY;  Colonoscopy;  Surgeon: Mike Fallon MD;  Location: [facility] GI     EXTRACORPOREAL SHOCK WAVE LITHOTRIPSY (ESWL) Bilateral 10/18/2017    Procedure: EXTRACORPOREAL SHOCK WAVE LITHOTRIPSY (ESWL);  BILATERAL EXTRACORPOREAL SHOCKWAVE LITHOTRIPSY ;  Surgeon: Meir Torres MD;  Location: [facility] OR     HC CORRECT BUNION,SIMPLE  08/11/2005    x3     HC REMV TOE BENIGN BONE LESN  08/11/2005     HERNIORRHAPHY INGUINAL  7/3/2013    Procedure: HERNIORRHAPHY INGUINAL;  Open Repair Inguinal hernia Right with mesh ;  Surgeon: Sam Escobar MD;  Location: [facility] OR     MOHS MICROGRAPHIC PROCEDURE  08/23/11    ear and chin-[facility]     OPEN REDUCTION INTERNAL FIXATION WRIST Right 7/18/2017    Procedure: OPEN REDUCTION INTERNAL FIXATION WRIST;  Right distal radius open reduction and internal fixation;  Surgeon: Pedro Blanca DO;  Location: [facility] OR     RECONSTRUCT FOREFOOT WITH METATARSOPHALANGEAL (MTP) FUSION  6/10/2014    Procedure: RECONSTRUCT FOREFOOT WITH METATARSOPHALANGEAL (MTP) FUSION;  Surgeon: Ash Gonzalez DPM;  Location: [facility] OR     STENT, CORONARY, DEMI       SURGICAL HISTORY OF -   1999/1974    lt knee     SURGICAL HISTORY OF -   10/2004    lithotripsy     SURGICAL HISTORY OF - 11/05    angiogram with stent     Social History   Substance Use Topics     Smoking status: Never Smoker     Smokeless tobacco: Never Used     Alcohol use 5.0 oz/week     10 Cans of beer per week      Comment: occasional      Current Outpatient Prescriptions   Medication Sig Dispense Refill     ALPRAZolam (XANAX) 0.5 MG  "tablet Take 1 tablet (0.5 mg) by mouth 3 times daily as needed for anxiety 90 tablet 1     aspirin 81 MG EC tablet Take 1 tablet (81 mg) by mouth daily 90 tablet 3     azithromycin (ZITHROMAX) 250 MG tablet Two tablets first day, then one tablet daily for four days. 6 tablet 0     cetirizine (ZYRTEC) 10 MG tablet Take 10 mg by mouth daily       CIALIS 20 MG tablet Take 20 mg by mouth daily as needed        citalopram (CELEXA) 40 MG tablet Take 1 tablet (40 mg) by mouth daily 30 tablet 6     HYDROcodone-acetaminophen (NORCO) 7.5-325 MG per tablet Take 1 tablet by mouth every 6 hours as needed for moderate to severe pain maximum 4 tablet(s) per day 120 tablet 0     hydrOXYzine (VISTARIL) 25 MG capsule Take 1 capsule (25 mg) by mouth 3 times daily as needed for anxiety 60 capsule 0     levofloxacin (LEVAQUIN) 500 MG tablet Take 1 tablet (500 mg) by mouth daily 10 tablet 1     lisinopril (PRINIVIL/ZESTRIL) 5 MG tablet Take 1 tablet (5 mg) by mouth daily 90 tablet 3     neomycin-polymyxin-hydrocortisone (CORTISPORIN) 3.5-59035-2 otic suspension Place 4 drops Into the left ear 4 times daily 10 mL 0     nitroglycerin (NITROSTAT) 0.4 MG sublingual tablet For chest pain place 1 tablet under the tongue every 5 minutes for 3 doses. If symptoms persist 5 minutes after 1st dose call 911. 25 tablet 0     oxyCODONE (ROXICODONE) 5 MG IR tablet Take 1 tablet (5 mg) by mouth every 4 hours as needed for pain maximum 4 tablet(s) per day 20 tablet 0     rosuvastatin (CRESTOR) 40 MG tablet Take 1 tablet (40 mg) by mouth daily 90 tablet 3     zolpidem (AMBIEN) 10 MG tablet Take 1 tablet (10 mg) by mouth nightly as needed for sleep 30 tablet 5       REVIEW OF SYSTEMS:   5 point ROS negative except as noted above in HPI, including Gen., Resp, CV, GI &  system review.     OBJECTIVE:  Vitals: /58  Pulse 74  Temp 97  F (36.1  C) (Temporal)  Resp 18  Ht 5' 8.5\" (1.74 m)  Wt 205 lb (93 kg)  SpO2 98%  BMI 30.71 kg/m2  BMI= Body " mass index is 30.71 kg/(m^2).  He is alert appears in no distress.  Eyes are normal.  Left canal is a little bit swollen.  Some bogginess noted.  TM looks okay.  No tenderness to traction on the tragus or in the periauricular area.  Right canal and TM are normal.  Throat clear.  Neck supple no adenopathy.    ASSESSMENT:  Left external otitis    PLAN:  Cortisporin drops up to 4 times a day.  Probably for 5 days to week.  Follow-up if not improving.        Jose Hernandez MD  [facility]

## 2018-05-09 NOTE — MR AVS SNAPSHOT
"              After Visit Summary   5/9/2018    Quan Murphy    MRN: [id number]           Patient Information     Date Of Birth          1950        Visit Information        Provider Department      5/9/2018 1:40 PM Jose Hernandez MD [facility]        Today's Diagnoses     Acute otitis externa of left ear, unspecified type    -  1       Follow-ups after your visit        Who to contact     If you have questions or need follow up information about today's clinic visit or your schedule please contact [facility] directly at [phone].  Normal or non-critical lab and imaging results will be communicated to you by [facility] [employer], letter or phone within 4 business days after the clinic has received the results. If you do not hear from us within 7 days, please contact the clinic through [employer] or phone. If you have a critical or abnormal lab result, we will notify you by phone as soon as possible.  Submit refill requests through [employer] or call your pharmacy and they will forward the refill request to us. Please allow 3 business days for your refill to be completed.          Additional Information About Your Visit        MyChart Information     [employer] gives you secure access to your electronic health record. If you see a primary care provider, you can also send messages to your care team and make appointments. If you have questions, please call your primary care clinic.  If you do not have a primary care provider, please call [phone] and they will assist you.        Care EveryWhere ID     This is your Care EveryWhere ID. This could be used by other organizations to access your [facility] medical records  [id number]        Your Vitals Were     Pulse Temperature Respirations Height Pulse Oximetry BMI (Body Mass Index)    74 97  F (36.1  C) (Temporal) 18 5' 8.5\" (1.74 m) 98% 30.71 kg/m2       Blood Pressure from Last 3 Encounters:   05/09/18 106/58   11/01/17 100/66   10/29/17 " 138/83    Weight from Last 3 Encounters:   05/09/18 205 lb (93 kg)   11/01/17 197 lb 6.4 oz (89.5 kg)   10/28/17 198 lb 6.6 oz (90 kg)              Today, you had the following     No orders found for display         Today's Medication Changes          These changes are accurate as of 5/9/18  2:35 PM.  If you have any questions, ask your nurse or doctor.               Start taking these medicines.        Dose/Directions    neomycin-polymyxin-hydrocortisone 3.5-21416-0 otic suspension   Commonly known as:  CORTISPORIN   Used for:  Acute otitis externa of left ear, unspecified type   Started by:  Jose Hernandez MD        Dose:  4 drop   Place 4 drops Into the left ear 4 times daily   Quantity:  10 mL   Refills:  0            Where to get your medicines      These medications were sent to [facility] [address]  [address] [facility] [address]     Phone:  [phone]     neomycin-polymyxin-hydrocortisone 3.5-80373-1 otic suspension                Primary Care Provider Office Phone # Fax #    Jose Hernandez [phone]       [address] [address]        Equal Access to Services     MAGGIE HARRIS : Hadii linda bragg hadasho Soomaali, waaxda luqadaha, qaybta kaalmada adeegyada, mitchell cox. So [facility] [phone].    ATENCIÓN: Si habla español, tiene a recinos disposición servicios gratuitos de asistencia lingüística. Llame al [phone].    We comply with applicable federal civil rights laws and [place] laws. We do not discriminate on the basis of race, color, national origin, age, disability, sex, sexual orientation, or gender identity.            Thank you!     Thank you for choosing [facility]  for your care. Our goal is always to provide you with excellent care. Hearing back from our patients is one way we can continue to improve our services. Please take a few minutes to complete the  written survey that you may receive in the mail after your visit with us. Thank you!             Your Updated Medication List - Protect others around you: Learn how to safely use, store and throw away your medicines at www.[employer] [facility].org.          This list is accurate as of 5/9/18  2:35 PM.  Always use your most recent med list.                   Brand Name Dispense Instructions for use Diagnosis    ALPRAZolam 0.5 MG tablet    XANAX    90 tablet    Take 1 tablet (0.5 mg) by mouth 3 times daily as needed for anxiety    Anxiety       aspirin 81 MG EC tablet     90 tablet    Take 1 tablet (81 mg) by mouth daily    Benign essential hypertension       azithromycin 250 MG tablet    ZITHROMAX    6 tablet    Two tablets first day, then one tablet daily for four days.    Sinus pressure       cetirizine 10 MG tablet    zyrTEC     Take 10 mg by mouth daily        CIALIS 20 MG tablet   Generic drug:  tadalafil      Take 20 mg by mouth daily as needed        citalopram 40 MG tablet    celeXA    30 tablet    Take 1 tablet (40 mg) by mouth daily    Anxiety       HYDROcodone-acetaminophen 7.5-325 MG per tablet    NORCO    120 tablet    Take 1 tablet by mouth every 6 hours as needed for moderate to severe pain maximum 4 tablet(s) per day    Kidney hematoma, unspecified laterality, initial encounter       hydrOXYzine 25 MG capsule    VISTARIL    60 capsule    Take 1 capsule (25 mg) by mouth 3 times daily as needed for anxiety    Arthropathy       levofloxacin 500 MG tablet    LEVAQUIN    10 tablet    Take 1 tablet (500 mg) by mouth daily    Cough       lisinopril 5 MG tablet    PRINIVIL/ZESTRIL    90 tablet    Take 1 tablet (5 mg) by mouth daily    Hypertension goal BP (blood pressure) < 140/90       neomycin-polymyxin-hydrocortisone 3.5-72054-4 otic suspension    CORTISPORIN    10 mL    Place 4 drops Into the left ear 4 times daily    Acute otitis externa of left ear, unspecified type       nitroGLYcerin 0.4 MG sublingual tablet     NITROSTAT    25 tablet    For chest pain place 1 tablet under the tongue every 5 minutes for 3 doses. If symptoms persist 5 minutes after 1st dose call 911.    Atherosclerosis of native coronary artery of native heart with unstable angina pectoris (H)       oxyCODONE IR 5 MG tablet    ROXICODONE    20 tablet    Take 1 tablet (5 mg) by mouth every 4 hours as needed for pain maximum 4 tablet(s) per day    Kidney hematoma, unspecified laterality, initial encounter       rosuvastatin 40 MG tablet    CRESTOR    90 tablet    Take 1 tablet (40 mg) by mouth daily    Hyperlipidemia LDL goal <130       zolpidem 10 MG tablet    AMBIEN    30 tablet    Take 1 tablet (10 mg) by mouth nightly as needed for sleep    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, insomnia type

## 2018-05-21 ENCOUNTER — MYC MEDICAL ADVICE (OUTPATIENT)
Dept: FAMILY MEDICINE | Facility: CLINIC | Age: 68
End: 2018-05-21

## 2018-05-21 DIAGNOSIS — J01.01 ACUTE RECURRENT MAXILLARY SINUSITIS: ICD-10-CM

## 2018-05-21 DIAGNOSIS — J32.9 SINUSITIS: Primary | ICD-10-CM

## 2018-05-22 ENCOUNTER — MYC MEDICAL ADVICE (OUTPATIENT)
Dept: FAMILY MEDICINE | Facility: CLINIC | Age: 68
End: 2018-05-22

## 2018-05-25 ENCOUNTER — MYC REFILL (OUTPATIENT)
Dept: FAMILY MEDICINE | Facility: CLINIC | Age: 68
End: 2018-05-25

## 2018-05-25 DIAGNOSIS — F41.9 ANXIETY: ICD-10-CM

## 2018-05-25 RX ORDER — ALPRAZOLAM 0.5 MG
0.5 TABLET ORAL 3 TIMES DAILY PRN
Qty: 90 TABLET | Refills: 1 | Status: SHIPPED | OUTPATIENT
Start: 2018-05-25 | End: 2018-07-22

## 2018-05-25 NOTE — TELEPHONE ENCOUNTER
Message from MyChart:  Original authorizing provider: MD Quan Dean would like a refill of the following medications:  ALPRAZolam (XANAX) 0.5 MG tablet [Jose Hernandez MD]    Preferred pharmacy: [address]     Comment:  LOV - 05/09/18

## 2018-05-29 ENCOUNTER — MYC REFILL (OUTPATIENT)
Dept: FAMILY MEDICINE | Facility: CLINIC | Age: 68
End: 2018-05-29

## 2018-05-29 DIAGNOSIS — S37.019A KIDNEY HEMATOMA, UNSPECIFIED LATERALITY, INITIAL ENCOUNTER: ICD-10-CM

## 2018-05-29 DIAGNOSIS — G47.01 SLEEP DISORDER DUE TO A GENERAL MEDICAL CONDITION, INSOMNIA TYPE: ICD-10-CM

## 2018-05-29 NOTE — TELEPHONE ENCOUNTER
Message from [email or web address]:  Original authorizing provider: MD Quan Dean ALFREDAFortino Murphy would like a refill of the following medications:  HYDROcodone-acetaminophen (NORCO) 7.5-325 MG per tablet [Jose Hernandez MD]  zolpidem (AMBIEN) 10 MG tablet [Jose Hernandez MD]    Preferred pharmacy: [address]     Comment:  Rm, Would like to try some Patenal ? (eye drops) again, if that is okay with you. Dry with a lot of pollen this spring.  LOV-05/09/2018

## 2018-05-30 RX ORDER — ZOLPIDEM TARTRATE 10 MG/1
10 TABLET ORAL
Qty: 30 TABLET | Refills: 5 | Status: SHIPPED | OUTPATIENT
Start: 2018-05-30 | End: 2018-10-17

## 2018-05-30 RX ORDER — HYDROCODONE BITARTRATE AND ACETAMINOPHEN 7.5; 325 MG/1; MG/1
1 TABLET ORAL EVERY 6 HOURS PRN
Qty: 120 TABLET | Refills: 0 | Status: SHIPPED | OUTPATIENT
Start: 2018-05-30 | End: 2018-06-27

## 2018-05-31 ENCOUNTER — MYC MEDICAL ADVICE (OUTPATIENT)
Dept: FAMILY MEDICINE | Facility: CLINIC | Age: 68
End: 2018-05-31

## 2018-05-31 DIAGNOSIS — H10.13 ALLERGIC CONJUNCTIVITIS, BILATERAL: Primary | ICD-10-CM

## 2018-05-31 RX ORDER — OLOPATADINE HYDROCHLORIDE 1 MG/ML
1 SOLUTION/ DROPS OPHTHALMIC 2 TIMES DAILY
Qty: 5 ML | Refills: 3 | Status: SHIPPED | OUTPATIENT
Start: 2018-05-31 | End: 2020-09-14

## 2018-05-31 NOTE — TELEPHONE ENCOUNTER
Quan calls this morning to inquire about his refill requests and if they have been taken to [facility] yet.     Quan is also asking about the refill of the medication he requested for his eyes/allergies.

## 2018-06-13 DIAGNOSIS — E29.1 MALE HYPOGONADISM: ICD-10-CM

## 2018-06-13 DIAGNOSIS — C61 PROSTATE CANCER (H): Primary | ICD-10-CM

## 2018-06-13 DIAGNOSIS — N20.0 KIDNEY STONES: ICD-10-CM

## 2018-06-17 NOTE — PATIENT INSTRUCTIONS
Date: 6/16/2018    Time: 10:57 PM    Patient Placed On BIPAP/CPAP/ Non-Invasive Ventilation? Yes    If no must comment. Facial area red/color change? Yes           If YES are Blister/Lesion present? Yes   If yes must notify nursing staff  BIPAP/CPAP skin barrier?   Yes    Skin barrier type:Liquicel       Comments:  RN was notified of small lesion on patient's nose    General Gunning Per my chart message reviewed by Dr. Ch, patient is to discontinue the lisinopril due to low BP's.

## 2018-06-20 ENCOUNTER — OFFICE VISIT (OUTPATIENT)
Dept: PODIATRY | Facility: CLINIC | Age: 68
End: 2018-06-20
Payer: MEDICARE

## 2018-06-20 ENCOUNTER — RADIANT APPOINTMENT (OUTPATIENT)
Dept: GENERAL RADIOLOGY | Facility: CLINIC | Age: 68
End: 2018-06-20
Attending: PODIATRIST
Payer: MEDICARE

## 2018-06-20 VITALS
WEIGHT: 210 LBS | DIASTOLIC BLOOD PRESSURE: 86 MMHG | SYSTOLIC BLOOD PRESSURE: 136 MMHG | HEIGHT: 69 IN | BODY MASS INDEX: 31.1 KG/M2

## 2018-06-20 DIAGNOSIS — M21.622 TAILOR'S BUNION OF LEFT FOOT: Primary | ICD-10-CM

## 2018-06-20 DIAGNOSIS — C61 PROSTATE CANCER (H): ICD-10-CM

## 2018-06-20 DIAGNOSIS — N20.0 KIDNEY STONES: ICD-10-CM

## 2018-06-20 DIAGNOSIS — E29.1 MALE HYPOGONADISM: ICD-10-CM

## 2018-06-20 LAB
CREAT SERPL-MCNC: 1.15 MG/DL (ref 0.66–1.25)
GFR SERPL CREATININE-BSD FRML MDRD: 63 ML/MIN/1.7M2
PSA SERPL-MCNC: 0.09 UG/L (ref 0–4)

## 2018-06-20 PROCEDURE — 82565 ASSAY OF CREATININE: CPT | Performed by: UROLOGY

## 2018-06-20 PROCEDURE — 84153 ASSAY OF PSA TOTAL: CPT | Performed by: UROLOGY

## 2018-06-20 PROCEDURE — 73630 X-RAY EXAM OF FOOT: CPT | Mod: TC

## 2018-06-20 PROCEDURE — 36415 COLL VENOUS BLD VENIPUNCTURE: CPT | Performed by: UROLOGY

## 2018-06-20 PROCEDURE — 84403 ASSAY OF TOTAL TESTOSTERONE: CPT | Performed by: UROLOGY

## 2018-06-20 PROCEDURE — 99203 OFFICE O/P NEW LOW 30 MIN: CPT | Performed by: PODIATRIST

## 2018-06-20 ASSESSMENT — PAIN SCALES - GENERAL: PAINLEVEL: MILD PAIN (2)

## 2018-06-20 NOTE — PATIENT INSTRUCTIONS
Tailors bunion or bunionette - Reliable shoe stores: To maximize your experience and provide the best possible fit.  Be sure to show them your foot concerns and tell them Dr. Gonzalez sent you.      Stores listed in bold have only athletic shoes, and stores that are not bold are mostly casual or variety of shoes    [employer]  [address] [place] [address]  [phone]    [employer]  [address] [place] [address]  [phone]    [employer] [place]  [address] [place] [address]  [phone]    [employer]  [address] [facility] [facility] [address]  [phone]    [employer]  [address] [place] [address]  [phone]    [employer]  [address] [place] [address]  [phone]                         [employer] Locations:     [address] [place] [address]   [phone]     [address] [place] [address] [address] [place] [address]   [phone]     [address] [place] [address]   [phone]     [address] [place] [address]   [phone]     [address] [place] [address] [place] [address]   [phone]     [address] [place] [address]   [phone]     [address]  [facility] [address]   [place] [address]   [phone]     [address] [facility]. [address]   [place], [place] [id number] [phone]             How to find reasonable shoes          The correct width    Correct Fitting    Correct Length      Foot Distortion    Posture Distortion                          Torsional Rigidity      Grasp behind the heel and underneath the foot and twist      Bad    Excessive torsion/twist in midfoot     Less torsion/twist in midfoot is better                   Heel Counter Rigidity      Grasp just above   midsole and squeeze      Bad    Soft heel counter      Good    Rigid Heel Counter      Flexion Rigidity      Grasp shoe and bend from forefoot to rearfoot

## 2018-06-20 NOTE — PROGRESS NOTES
HPI:  Quan Murphy is a 67 year old male who is seen in consultation at the request of self.    Pt presents for eval of:   (Onset, Location, L/R, Character, Treatments, Injury if yes)    XR Left foot today, 6/20/2018     Onset Fall 2017, lateral Left bunion pain. No injury noted. 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    Weight management plan: Patient was referred to their PCP to discuss a diet and exercise plan.     Patient to follow up with Primary Care provider regarding elevated blood pressure.    ROS:  10 point ROS neg other than the symptoms noted above in the HPI.    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, insomnia type     Allergic conjunctivitis     Anxiety     Hyperlipidemia LDL goal <130     Hypertension goal BP (blood pressure) < 140/90     Advanced directives, counseling/discussion     Inguinal hernia     Degenerative arthritis of foot     Hallux rigidus     Coronary atherosclerosis     Arthropathy     Chest pain     Status post insertion of drug-eluting stent into left anterior descending artery for coronary artery disease     S/P ORIF (open reduction internal fixation) fracture     Closed Colles' fracture of right radius with routine healing, subsequent encounter     Syncope     Renal hematoma     Retroperitoneal hematoma       PAST MEDICAL HISTORY:   Past Medical History:   Diagnosis Date     Allergy, unspecified not elsewhere classified     Seasonal allergies, pollen, dust, smoke and animals     Antiplatelet or antithrombotic long-term use      Anxiety      Arthritis      Chest pain      Chronic sinusitis      Coronary atherosclerosis of  unspecified type of vessel, native or graft     Coronary artery disease     Hyperlipidemia      Hypertension      Inguinal hernia      Kidney stones      Malignant neoplasm of prostate (H)     Prostate cancer     Prostate cancer (H)         PAST SURGICAL HISTORY:   Past Surgical History:   Procedure Laterality Date     ARTHRODESIS FOOT  7/23/2013    Procedure: ARTHRODESIS FOOT;  Great Toe Arthrodesis Left Foot;  Surgeon: Ahs Gonzalez DPM;  Location: [facility]     ARTHRODESIS FOOT  6/10/2014    Procedure: ARTHRODESIS FOOT;  Surgeon: Ash Gonzalez DPM;  Location: [facility] OR     C LAPAROSCOPY, SURGICAL PROSTATECTOMY, RETROPUBIC RADICAL, W/NERVE SPARING  11/30/2004    With full bilateral pelvic lymphadenectomy.  [facility].     C TOTAL KNEE ARTHROPLASTY  05/01/08    Left knee     COLONOSCOPY  10/7/2013    Procedure: COLONOSCOPY;  Colonoscopy;  Surgeon: Mike Fallon MD;  Location: [facility]     EXTRACORPOREAL SHOCK WAVE LITHOTRIPSY (ESWL) Bilateral 10/18/2017    Procedure: EXTRACORPOREAL SHOCK WAVE LITHOTRIPSY (ESWL);  BILATERAL EXTRACORPOREAL SHOCKWAVE LITHOTRIPSY ;  Surgeon: Meir Torres MD;  Location: [facility] OR     HC CORRECT BUNION,SIMPLE  08/11/2005    x3     HC REMV TOE BENIGN BONE LESN  08/11/2005     HERNIORRHAPHY INGUINAL  7/3/2013    Procedure: HERNIORRHAPHY INGUINAL;  Open Repair Inguinal hernia Right with mesh ;  Surgeon: Sam Escobar MD;  Location: [facility] OR     MOHS MICROGRAPHIC PROCEDURE  08/23/11    ear and chin-[facility]     OPEN REDUCTION INTERNAL FIXATION WRIST Right 7/18/2017    Procedure: OPEN REDUCTION INTERNAL FIXATION WRIST;  Right distal radius open reduction and internal fixation;  Surgeon: Pedro Blanca DO;  Location: [facility]     RECONSTRUCT FOREFOOT WITH METATARSOPHALANGEAL (MTP) FUSION  6/10/2014    Procedure: RECONSTRUCT FOREFOOT WITH METATARSOPHALANGEAL (MTP) FUSION;  Surgeon: Ash Gonzalez DPM;  Location: [facility]     STENT, CORONARY, DEMI       SURGICAL  HISTORY OF -   1999/1974    lt knee     SURGICAL HISTORY OF -   10/2004    lithotripsy     SURGICAL HISTORY OF -   11/05    angiogram with stent        MEDICATIONS:   Current Outpatient Prescriptions:      ALPRAZolam (XANAX) 0.5 MG tablet, Take 1 tablet (0.5 mg) by mouth 3 times daily as needed for anxiety, Disp: 90 tablet, Rfl: 1     aspirin 81 MG EC tablet, Take 1 tablet (81 mg) by mouth daily, Disp: 90 tablet, Rfl: 3     cetirizine (ZYRTEC) 10 MG tablet, Take 10 mg by mouth daily, Disp: , Rfl:      CIALIS 20 MG tablet, Take 20 mg by mouth daily as needed , Disp: , Rfl:      citalopram (CELEXA) 40 MG tablet, Take 1 tablet (40 mg) by mouth daily, Disp: 30 tablet, Rfl: 6     HYDROcodone-acetaminophen (NORCO) 7.5-325 MG per tablet, Take 1 tablet by mouth every 6 hours as needed for moderate to severe pain maximum 4 tablet(s) per day, Disp: 120 tablet, Rfl: 0     hydrOXYzine (VISTARIL) 25 MG capsule, Take 1 capsule (25 mg) by mouth 3 times daily as needed for anxiety, Disp: 60 capsule, Rfl: 0     levofloxacin (LEVAQUIN) 500 MG tablet, Take 1 tablet (500 mg) by mouth daily, Disp: 10 tablet, Rfl: 1     lisinopril (PRINIVIL/ZESTRIL) 5 MG tablet, Take 1 tablet (5 mg) by mouth daily, Disp: 90 tablet, Rfl: 3     neomycin-polymyxin-hydrocortisone (CORTISPORIN) 3.5-23625-1 otic suspension, Place 4 drops Into the left ear 4 times daily, Disp: 10 mL, Rfl: 0     nitroglycerin (NITROSTAT) 0.4 MG sublingual tablet, For chest pain place 1 tablet under the tongue every 5 minutes for 3 doses. If symptoms persist 5 minutes after 1st dose call 911., Disp: 25 tablet, Rfl: 0     olopatadine (PATANOL) 0.1 % ophthalmic solution, Place 1 drop into both eyes 2 times daily, Disp: 5 mL, Rfl: 3     oxyCODONE (ROXICODONE) 5 MG IR tablet, Take 1 tablet (5 mg) by mouth every 4 hours as needed for pain maximum 4 tablet(s) per day, Disp: 20 tablet, Rfl: 0     rosuvastatin (CRESTOR) 40 MG tablet, Take 1 tablet (40 mg) by mouth daily, Disp: 90 tablet,  "Rfl: 3     zolpidem (AMBIEN) 10 MG tablet, Take 1 tablet (10 mg) by mouth nightly as needed for sleep, Disp: 30 tablet, Rfl: 5     ALLERGIES:    Allergies   Allergen Reactions     Animal Dander      Dust Mites      Pollen Extract      Smoke.         SOCIAL HISTORY:   Social History     Social History     Marital status:      Spouse name: Alessandra     Number of children: 2     Years of education: N/A     Occupational History     Not on file.     Social History Main Topics     Smoking status: Never Smoker     Smokeless tobacco: Never Used     Alcohol use 5.0 oz/week     10 Cans of beer per week      Comment: occasional      Drug use: No     Sexual activity: Yes     Partners: Female     Other Topics Concern     Not on file     Social History Narrative        FAMILY HISTORY:   Family History   Problem Relation Age of Onset     Hypertension Father      has had MI      Connective Tissue Disorder Mother      LUPUS     HEART DISEASE Mother      poor valve-needing replacement        EXAM:Vitals: /86 (BP Location: Left arm, Cuff Size: Adult Regular)  Ht 5' 8.5\" (1.74 m)  Wt 210 lb (95.3 kg)  BMI 31.47 kg/m2  BMI= Body mass index is 31.47 kg/(m^2).    General appearance: Patient is alert and fully cooperative with history & exam.  No sign of distress is noted during the visit.     Psychiatric: Affect is pleasant & appropriate.  Patient appears motivated to improve health.     Respiratory: Breathing is regular & unlabored while sitting.     HEENT: Hearing is intact to spoken word.  Speech is clear.  No gross evidence of visual impairment that would impact ambulation.     Vascular: DP & PT pulses are intact & regular bilaterally.  No significant edema or varicosities noted.  CFT and skin temperature is normal to both lower extremities.     Neurologic: Lower extremity sensation is intact to light touch.  No evidence of weakness or contracture in the lower extremities.  No evidence of neuropathy.    Dermatologic: Skin " is intact to both lower extremities with adequate texture, turgor and tone about the integument.  No paronychia or evidence of soft tissue infection is noted.     Musculoskeletal: Patient is ambulatory with custom molded orthotic and proper shoe gear.  Moderate prominence of the left foot tailor's bunion which is nonreducible.  Adequate range of motion through the first MPJ.    Radiographs: 3 views of the left foot demonstrate slightly elevated intermetatarsal angle and lateral deviation angle of the left fifth metatarsal phalangeal joint.  Radiographic union of first metatarsal phalangeal joint.  Overall metatarsus adductus of the forefoot at the metatarsal phalangeal joints.    Creatinine (mg/dL)   Date Value   06/20/2018 1.15   10/28/2017 1.15   10/19/2017 2.51 (H)   10/18/2017 1.67 (H)   10/07/2017 1.00   07/17/2017 1.24        ASSESSMENT:       ICD-10-CM    1. Tailor's bunion of left foot M21.622 XR Foot Left G/E 3 Views        PLAN:  Reviewed patient's chart in [facility].      6/20/2018   Interpret xray   Discussed options of accommodation as well as surgical reconstruction and recovery  All questions were answered.  Written instructions dispensed  Follow-up as needed    Ash Gonzalez DPM

## 2018-06-20 NOTE — MR AVS SNAPSHOT
After Visit Summary   6/20/2018    Quan Murphy    MRN: [id number]           Patient Information     Date Of Birth          1950        Visit Information        Provider Department      6/20/2018 2:00 PM Ash Gonzalez, [facility]        Today's Diagnoses     Tailor's bunion of left foot    -  1      Care Instructions    Tailors bunion or bunionette - Reliable shoe stores: To maximize your experience and provide the best possible fit.  Be sure to show them your foot concerns and tell them Dr. Gonzalez sent you.      Stores listed in bold have only athletic shoes, and stores that are not bold are mostly casual or variety of shoes    [employer]  [address] [place] [address]  [phone]    [employer]  [address] [place] [address]  [phone]    [employer] [place]  [address] [place] [address]  [phone]    [employer] [facility]  [address] [facility] [address]  [phone]    [employer]  [address] [place] [address]  [phone]    [employer]  [address] [place] [address]  [phone]                         [employer] Locations:     [address] [place] [address]   [phone]     [address] [place] [address] [address] [place] [address]   [phone]     [address] [place] [address]   [phone]     [address] [place] [address]   [phone]     [address] [place] [address] [place] [address]   [phone]     [address] [place] [address]   [phone]     [address]  [facility] [address]   [place] [address]   [phone]     [address]   [place], [place] [id number] [phone]             How to find reasonable shoes          The correct width    Correct Fitting    Correct Length      Foot Distortion    Posture Distortion                          Torsional Rigidity      Grasp behind the heel and  underneath the foot and twist      Bad    Excessive torsion/twist in midfoot     Less torsion/twist in midfoot is better                   Heel Counter Rigidity      Grasp just above   midsole and squeeze      Bad    Soft heel counter      Good    Rigid Heel Counter      Flexion Rigidity      Grasp shoe and bend from forefoot to rearfoot                        Follow-ups after your visit        Your next 10 appointments already scheduled     Jun 28, 2018 10:00 AM CDT   (Arrive by 9:45 AM)   XR KUB with PHXR2   [facility] ([facility])    [address] [place] [address] [phone]           Please bring a list of your current medicines to your exam. (Include vitamins, minerals and over-thecounter medicines.) Leave your valuables at home.  Tell your doctor if there is a chance you may be pregnant.  You do not need to do anything special for this exam.              Who to contact     If you have questions or need follow up information about today's clinic visit or your schedule please contact [facility] directly at [phone].  Normal or non-critical lab and imaging results will be communicated to you by Babl[employer], letter or phone within 4 business days after the clinic has received the results. If you do not hear from us within 7 days, please contact the clinic through [employer] or phone. If you have a critical or abnormal lab result, we will notify you by phone as soon as possible.  Submit refill requests through [employer] [place] or call your pharmacy and they will forward the refill request to us. Please allow 3 business days for your refill to be completed.          Additional Information About Your Visit        [employer] Information     [employer] gives you secure access to your electronic health record. If you see a primary care provider, you can also send messages to your care team and make appointments. If you have questions, please call your primary care clinic.  " If you do not have a primary care provider, please call [phone] and they will assist you.        Care EveryWhere ID     This is your Care EveryWhere ID. This could be used by other organizations to access your [facility] medical records  [id number]        Your Vitals Were     Height BMI (Body Mass Index)                5' 8.5\" (1.74 m) 31.47 kg/m2           Blood Pressure from Last 3 Encounters:   06/20/18 136/86   05/09/18 106/58   11/01/17 100/66    Weight from Last 3 Encounters:   06/20/18 210 lb (95.3 kg)   05/09/18 205 lb (93 kg)   11/01/17 197 lb 6.4 oz (89.5 kg)              We Performed the Following     XR Foot Left G/E 3 Views        Primary Care Provider Office Phone # Fax #    Jose Hernandez [phone]       [address] [facility] [address]        Equal Access to Services     [facility] : Hadii aad ku hadasho Soomaali, waaxda luqadaha, qaybta kaalmada adeegyada, waxay idiin hayalfn kay mckeonarapedro hooper . So [facility] [phone].    ATENCIÓN: Si habla español, tiene a recinos disposición servicios gratuitos de asistencia lingüística. Llame al [phone].    We comply with applicable federal civil rights laws and [place] laws. We do not discriminate on the basis of race, color, national origin, age, disability, sex, sexual orientation, or gender identity.            Thank you!     Thank you for choosing [facility]  for your care. Our goal is always to provide you with excellent care. Hearing back from our patients is one way we can continue to improve our services. Please take a few minutes to complete the written survey that you may receive in the mail after your visit with us. Thank you!             Your Updated Medication List - Protect others around you: Learn how to safely use, store and throw away your medicines at www.disposemymeds.org.          This list is accurate as of 6/20/18  2:40 PM.  Always use your most recent med list.                   " Brand Name Dispense Instructions for use Diagnosis    ALPRAZolam 0.5 MG tablet    XANAX    90 tablet    Take 1 tablet (0.5 mg) by mouth 3 times daily as needed for anxiety    Anxiety       aspirin 81 MG EC tablet     90 tablet    Take 1 tablet (81 mg) by mouth daily    Benign essential hypertension       cetirizine 10 MG tablet    zyrTEC     Take 10 mg by mouth daily        CIALIS 20 MG tablet   Generic drug:  tadalafil      Take 20 mg by mouth daily as needed        citalopram 40 MG tablet    celeXA    30 tablet    Take 1 tablet (40 mg) by mouth daily    Anxiety       HYDROcodone-acetaminophen 7.5-325 MG per tablet    NORCO    120 tablet    Take 1 tablet by mouth every 6 hours as needed for moderate to severe pain maximum 4 tablet(s) per day    Kidney hematoma, unspecified laterality, initial encounter       hydrOXYzine 25 MG capsule    VISTARIL    60 capsule    Take 1 capsule (25 mg) by mouth 3 times daily as needed for anxiety    Arthropathy       levofloxacin 500 MG tablet    LEVAQUIN    10 tablet    Take 1 tablet (500 mg) by mouth daily    Cough       lisinopril 5 MG tablet    PRINIVIL/ZESTRIL    90 tablet    Take 1 tablet (5 mg) by mouth daily    Hypertension goal BP (blood pressure) < 140/90       neomycin-polymyxin-hydrocortisone 3.5-06629-0 otic suspension    CORTISPORIN    10 mL    Place 4 drops Into the left ear 4 times daily    Acute otitis externa of left ear, unspecified type       nitroGLYcerin 0.4 MG sublingual tablet    NITROSTAT    25 tablet    For chest pain place 1 tablet under the tongue every 5 minutes for 3 doses. If symptoms persist 5 minutes after 1st dose call 911.    Atherosclerosis of native coronary artery of native heart with unstable angina pectoris (H)       olopatadine 0.1 % ophthalmic solution    PATANOL    5 mL    Place 1 drop into both eyes 2 times daily    Allergic conjunctivitis, bilateral       oxyCODONE IR 5 MG tablet    ROXICODONE    20 tablet    Take 1 tablet (5 mg) by mouth  every 4 hours as needed for pain maximum 4 tablet(s) per day    Kidney hematoma, unspecified laterality, initial encounter       rosuvastatin 40 MG tablet    CRESTOR    90 tablet    Take 1 tablet (40 mg) by mouth daily    Hyperlipidemia LDL goal <130       zolpidem 10 MG tablet    AMBIEN    30 tablet    Take 1 tablet (10 mg) by mouth nightly as needed for sleep    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, insomnia type

## 2018-06-22 LAB — TESTOST SERPL-MCNC: 1009 NG/DL (ref 240–950)

## 2018-06-27 ENCOUNTER — MYC REFILL (OUTPATIENT)
Dept: FAMILY MEDICINE | Facility: CLINIC | Age: 68
End: 2018-06-27

## 2018-06-27 DIAGNOSIS — S37.019A KIDNEY HEMATOMA, UNSPECIFIED LATERALITY, INITIAL ENCOUNTER: ICD-10-CM

## 2018-06-27 RX ORDER — HYDROCODONE BITARTRATE AND ACETAMINOPHEN 7.5; 325 MG/1; MG/1
1 TABLET ORAL EVERY 6 HOURS PRN
Qty: 120 TABLET | Refills: 0 | Status: SHIPPED | OUTPATIENT
Start: 2018-06-27 | End: 2018-07-22

## 2018-06-27 NOTE — TELEPHONE ENCOUNTER
Message from MyChart:  Original authorizing provider: MD Quan Dean would like a refill of the following medications:  HYDROcodone-acetaminophen (NORCO) 7.5-325 MG per tablet [Jose Hernandez MD]    Preferred pharmacy: [address]     Comment:

## 2018-06-27 NOTE — TELEPHONE ENCOUNTER
nbaco      Last Written Prescription Date:  5/30/18  Last Fill Quantity: 120,   # refills: 0  Last Office Visit: 5/09/18  Future Office visit:       Routing refill request to provider for review/approval because:  Drug not on the [facility], P or [facility] refill protocol or controlled substance

## 2018-06-28 ENCOUNTER — HOSPITAL ENCOUNTER (OUTPATIENT)
Dept: GENERAL RADIOLOGY | Facility: CLINIC | Age: 68
Discharge: HOME | End: 2018-06-28
Attending: UROLOGY | Admitting: UROLOGY
Payer: MEDICARE

## 2018-06-28 ENCOUNTER — TRANSFERRED RECORDS (OUTPATIENT)
Dept: HEALTH INFORMATION MANAGEMENT | Facility: CLINIC | Age: 68
End: 2018-06-28

## 2018-06-28 DIAGNOSIS — N20.0 KIDNEY STONES: ICD-10-CM

## 2018-06-28 PROCEDURE — 74019 RADEX ABDOMEN 2 VIEWS: CPT | Mod: TC

## 2018-07-22 ENCOUNTER — MYC REFILL (OUTPATIENT)
Dept: FAMILY MEDICINE | Facility: CLINIC | Age: 68
End: 2018-07-22

## 2018-07-22 DIAGNOSIS — S37.019A KIDNEY HEMATOMA, UNSPECIFIED LATERALITY, INITIAL ENCOUNTER: ICD-10-CM

## 2018-07-22 DIAGNOSIS — H60.502 ACUTE OTITIS EXTERNA OF LEFT EAR, UNSPECIFIED TYPE: ICD-10-CM

## 2018-07-22 DIAGNOSIS — F41.9 ANXIETY: ICD-10-CM

## 2018-07-23 RX ORDER — HYDROCODONE BITARTRATE AND ACETAMINOPHEN 7.5; 325 MG/1; MG/1
1 TABLET ORAL EVERY 6 HOURS PRN
Qty: 120 TABLET | Refills: 0 | Status: SHIPPED | OUTPATIENT
Start: 2018-07-23 | End: 2018-08-21

## 2018-07-23 RX ORDER — ALPRAZOLAM 0.5 MG
0.5 TABLET ORAL 3 TIMES DAILY PRN
Qty: 90 TABLET | Refills: 1 | Status: SHIPPED | OUTPATIENT
Start: 2018-07-23 | End: 2018-09-19

## 2018-07-23 RX ORDER — NEOMYCIN SULFATE, POLYMYXIN B SULFATE AND HYDROCORTISONE 10; 3.5; 1 MG/ML; MG/ML; [USP'U]/ML
4 SUSPENSION/ DROPS AURICULAR (OTIC) 4 TIMES DAILY
Qty: 10 ML | Refills: 0 | Status: SHIPPED | OUTPATIENT
Start: 2018-07-23 | End: 2020-07-15

## 2018-07-23 NOTE — TELEPHONE ENCOUNTER
Requested Prescriptions   Pending Prescriptions Disp Refills     neomycin-polymyxin-hydrocortisone (CORTISPORIN) 3.5-85580-9 otic suspension 10 mL 0     Sig: Place 4 drops Into the left ear 4 times daily    There is no refill protocol information for this order   Last Written Prescription Date:  05/09/2018  Last Fill Quantity: 10ml,  # refills: 0   Last office visit: 5/9/2018 with prescribing provider:  05/09/2018   Future Office Visit:         ALPRAZolam (XANAX) 0.5 MG tablet 90 tablet 1     Sig: Take 1 tablet (0.5 mg) by mouth 3 times daily as needed for anxiety    There is no refill protocol information for this order   Last Written Prescription Date:  05/25/2018  Last Fill Quantity: 90,  # refills: 1   Last office visit: 5/9/2018 with prescribing provider:  05/09/2018   Future Office Visit:         HYDROcodone-acetaminophen (NORCO) 7.5-325 MG per tablet 120 tablet 0     Sig: Take 1 tablet by mouth every 6 hours as needed for moderate to severe pain maximum 4 tablet(s) per day    There is no refill protocol information for this order      'Last Written Prescription Date:  06/27/2018  Last Fill Quantity: 120,  # refills: 0   Last office visit: 5/9/2018 with prescribing provider:  05/09/2018   Future Office Visit:

## 2018-08-21 ENCOUNTER — MYC REFILL (OUTPATIENT)
Dept: FAMILY MEDICINE | Facility: CLINIC | Age: 68
End: 2018-08-21

## 2018-08-21 DIAGNOSIS — S37.019A KIDNEY HEMATOMA, UNSPECIFIED LATERALITY, INITIAL ENCOUNTER: ICD-10-CM

## 2018-08-21 NOTE — TELEPHONE ENCOUNTER
Message from Get.comhart:  Original authorizing provider: MD Quan Dean ALFREDAFortino Kearnssalvatore would like a refill of the following medications:  HYDROcodone-acetaminophen (NORCO) 7.5-325 MG per tablet [Jose Hernandez MD]    Preferred pharmacy: [address]     Comment:  Rm, Looking out a few weeks at my messaging chart, I was wondering if I could schedule a blood draw for cholestrol and get my flu shot on the same day? (They may have done a cholestrol check a short time back as part of a visit with Dr. Ch.) Just let me know. Thanks, New PARMAR          Last Written Prescription Date:  07/23/18  Last Fill Quantity: 120,   # refills: 0  Last Office Visit: 5/09/18  Future Office visit:       Routing refill request to provider for review/approval because:  Drug not on the [facility], [facility] or  Health refill protocol or controlled substance

## 2018-08-22 RX ORDER — HYDROCODONE BITARTRATE AND ACETAMINOPHEN 7.5; 325 MG/1; MG/1
1 TABLET ORAL EVERY 6 HOURS PRN
Qty: 120 TABLET | Refills: 0 | Status: SHIPPED | OUTPATIENT
Start: 2018-08-22 | End: 2018-09-19

## 2018-08-23 ENCOUNTER — TELEPHONE (OUTPATIENT)
Dept: FAMILY MEDICINE | Facility: CLINIC | Age: 68
End: 2018-08-23

## 2018-08-23 NOTE — TELEPHONE ENCOUNTER
Left message for patient informing labs are not due until 10/9/18 for cholesterol and there are future orders in to have done before 10/9/18. Also informed that the flu shot is not available.  Gretta Barron MA

## 2018-08-24 DIAGNOSIS — E78.5 HYPERLIPIDEMIA LDL GOAL <130: ICD-10-CM

## 2018-08-24 DIAGNOSIS — I10 HYPERTENSION GOAL BP (BLOOD PRESSURE) < 140/90: ICD-10-CM

## 2018-08-24 LAB
ALT SERPL W P-5'-P-CCNC: 40 U/L (ref 0–70)
ANION GAP SERPL CALCULATED.3IONS-SCNC: 12 MMOL/L (ref 3–14)
BUN SERPL-MCNC: 17 MG/DL (ref 7–30)
CALCIUM SERPL-MCNC: 8.8 MG/DL (ref 8.5–10.1)
CHLORIDE SERPL-SCNC: 105 MMOL/L (ref 94–109)
CHOLEST SERPL-MCNC: 170 MG/DL
CO2 SERPL-SCNC: 25 MMOL/L (ref 20–32)
CREAT SERPL-MCNC: 1.18 MG/DL (ref 0.66–1.25)
GFR SERPL CREATININE-BSD FRML MDRD: 62 ML/MIN/1.7M2
GLUCOSE SERPL-MCNC: 91 MG/DL (ref 70–99)
HDLC SERPL-MCNC: 70 MG/DL
LDLC SERPL CALC-MCNC: 63 MG/DL
NONHDLC SERPL-MCNC: 100 MG/DL
POTASSIUM SERPL-SCNC: 4 MMOL/L (ref 3.4–5.3)
SODIUM SERPL-SCNC: 142 MMOL/L (ref 133–144)
TRIGL SERPL-MCNC: 186 MG/DL

## 2018-08-24 PROCEDURE — 80048 BASIC METABOLIC PNL TOTAL CA: CPT | Performed by: INTERNAL MEDICINE

## 2018-08-24 PROCEDURE — 80061 LIPID PANEL: CPT | Performed by: INTERNAL MEDICINE

## 2018-08-24 PROCEDURE — 84460 ALANINE AMINO (ALT) (SGPT): CPT | Performed by: INTERNAL MEDICINE

## 2018-08-24 PROCEDURE — 36415 COLL VENOUS BLD VENIPUNCTURE: CPT | Performed by: INTERNAL MEDICINE

## 2018-09-19 ENCOUNTER — MYC REFILL (OUTPATIENT)
Dept: FAMILY MEDICINE | Facility: CLINIC | Age: 68
End: 2018-09-19

## 2018-09-19 DIAGNOSIS — F41.9 ANXIETY: ICD-10-CM

## 2018-09-19 DIAGNOSIS — S37.019A KIDNEY HEMATOMA, UNSPECIFIED LATERALITY, INITIAL ENCOUNTER: ICD-10-CM

## 2018-09-19 RX ORDER — ALPRAZOLAM 0.5 MG
0.5 TABLET ORAL 3 TIMES DAILY PRN
Qty: 90 TABLET | Refills: 1 | Status: CANCELLED | OUTPATIENT
Start: 2018-09-19

## 2018-09-19 RX ORDER — HYDROCODONE BITARTRATE AND ACETAMINOPHEN 7.5; 325 MG/1; MG/1
1 TABLET ORAL EVERY 6 HOURS PRN
Qty: 120 TABLET | Refills: 0 | Status: CANCELLED | OUTPATIENT
Start: 2018-09-19

## 2018-09-19 NOTE — TELEPHONE ENCOUNTER
Message from MyChart:  Original authorizing provider: MD Quan Dean ALFREDAFortino Murphy would like a refill of the following medications:  ALPRAZolam (XANAX) 0.5 MG tablet [Jose Hernandez MD]  HYDROcodone-acetaminophen (NORCO) 7.5-325 MG per tablet [Jose Hernandez MD]    Preferred pharmacy: [address]     Comment:        Last Written Prescription Date:  07/23/18-alprazolam, 08/22/18  Last Fill Quantity: 90, 122  # refills: 1, 0  Last Office Visit: 05/09/18  Future Office visit:       Routing refill request to provider for review/approval because:  Drug not on the [facility], [facility] or [facility] refill protocol or controlled substance

## 2018-09-20 ENCOUNTER — MYC REFILL (OUTPATIENT)
Dept: FAMILY MEDICINE | Facility: CLINIC | Age: 68
End: 2018-09-20

## 2018-09-20 DIAGNOSIS — F41.9 ANXIETY: ICD-10-CM

## 2018-09-20 RX ORDER — HYDROCODONE BITARTRATE AND ACETAMINOPHEN 7.5; 325 MG/1; MG/1
1 TABLET ORAL EVERY 6 HOURS PRN
Qty: 120 TABLET | Refills: 0 | Status: SHIPPED | OUTPATIENT
Start: 2018-09-20 | End: 2018-10-17

## 2018-09-20 RX ORDER — ALPRAZOLAM 0.5 MG
0.5 TABLET ORAL 3 TIMES DAILY PRN
Qty: 90 TABLET | Refills: 1 | Status: SHIPPED | OUTPATIENT
Start: 2018-09-20 | End: 2018-11-13

## 2018-09-20 NOTE — TELEPHONE ENCOUNTER
Message from MyChart:  Original authorizing provider: MD Quan Dean ALFREDAFortino Murphy would like a refill of the following medications:  ALPRAZolam (XANAX) 0.5 MG tablet [Jose Hernandez MD]  HYDROcodone-acetaminophen (NORCO) 7.5-325 MG per tablet [Jose Hernandez MD]    Preferred pharmacy: [address]    Comment:  Team, Not sure if the first request got through. Rm usually responds same day. New PARMAR

## 2018-09-20 NOTE — TELEPHONE ENCOUNTER
"Requested Prescriptions   Pending Prescriptions Disp Refills     citalopram (CELEXA) 40 MG tablet [Pharmacy Med Name: CITALOPRAM HYDROBROMIDE 40MG TABS] 30 tablet 6    Last Written Prescription Date:  03/02/2018  Last Fill Quantity: 30,  # refills: 6   Last office visit: 5/9/2018 with prescribing provider:  05/09/2018Wilson    Future Office Visit:     Sig: TAKE ONE TABLET BY MOUTH EVERY DAY    SSRIs Protocol Passed    9/20/2018  6:22 PM       Passed - Recent (12 mo) or future (30 days) visit within the authorizing provider's specialty    Patient had office visit in the last 12 months or has a visit in the next 30 days with authorizing provider or within the authorizing provider's specialty.  See \"Patient Info\" tab in inbasket, or \"Choose Columns\" in Meds & Orders section of the refill encounter.           Passed - Patient is age 18 or older          "

## 2018-09-21 RX ORDER — CITALOPRAM HYDROBROMIDE 40 MG/1
40 TABLET ORAL DAILY
Qty: 30 TABLET | Refills: 6 | Status: SHIPPED | OUTPATIENT
Start: 2018-09-21 | End: 2019-04-11

## 2018-09-21 NOTE — TELEPHONE ENCOUNTER
"Requested Prescriptions   Pending Prescriptions Disp Refills     citalopram (CELEXA) 40 MG tablet 30 tablet 6    Last Written Prescription Date:  3/2/18  Last Fill Quantity: 30,  # refills: 6   Last office visit: 5/9/2018 with prescribing provider:  5/9/18   Future Office Visit:     Sig: Take 1 tablet (40 mg) by mouth daily    SSRIs Protocol Passed    9/21/2018  7:40 AM       Passed - Recent (12 mo) or future (30 days) visit within the authorizing provider's specialty    Patient had office visit in the last 12 months or has a visit in the next 30 days with authorizing provider or within the authorizing provider's specialty.  See \"Patient Info\" tab in inbasket, or \"Choose Columns\" in Meds & Orders section of the refill encounter.           Passed - Patient is age 18 or older          "

## 2018-09-21 NOTE — TELEPHONE ENCOUNTER
Message from [employer]:  Original authorizing provider: MD Quan Dean ALFREDAFortino Murphy would like a refill of the following medications:  citalopram (CELEXA) 40 MG tablet [Jose Hernandez MD]    Preferred pharmacy: [address]     Comment:  Called its in, thinking that I had a refill. Did not, so you may get a duplicate request. My mistake. Thanks, New PRAMAR

## 2018-09-21 NOTE — TELEPHONE ENCOUNTER
Prescription approved per [facility] Refill Protocol.  Nichol Hernandez RN

## 2018-09-24 RX ORDER — CITALOPRAM HYDROBROMIDE 40 MG/1
TABLET ORAL
Qty: 30 TABLET | Refills: 6 | OUTPATIENT
Start: 2018-09-24

## 2018-09-24 NOTE — TELEPHONE ENCOUNTER
Rx was sent 9/21/2018 for 1 months and 6 refills. Patient should have medication through 3/21/2019  Pharmacy notified via E-prescribe refusal  Chela Garsia, RN, BSN

## 2018-10-01 ENCOUNTER — TELEPHONE (OUTPATIENT)
Dept: FAMILY MEDICINE | Facility: CLINIC | Age: 68
End: 2018-10-01

## 2018-10-01 DIAGNOSIS — M12.9 ARTHROPATHY: ICD-10-CM

## 2018-10-01 DIAGNOSIS — I10 HYPERTENSION GOAL BP (BLOOD PRESSURE) < 140/90: ICD-10-CM

## 2018-10-01 RX ORDER — LISINOPRIL 5 MG/1
5 TABLET ORAL DAILY
Qty: 90 TABLET | Refills: 0 | Status: SHIPPED | OUTPATIENT
Start: 2018-10-01 | End: 2018-11-06

## 2018-10-01 NOTE — TELEPHONE ENCOUNTER
----- Message from Quan Murphy sent at 9/30/2018 10:18 AM CDT -----  Regarding: Appointment Request  Contact: [phone]  Appointment Request From: Quan Murphy    With Provider: AIDEN ALEXANDRE MD [[facility]; [place]]    Preferred Date Range: From 9/30/2018 To 10/5/2018    Preferred Times: Any    Reason for visit: Request an Appointment    Comments:  Annual check up

## 2018-10-08 ENCOUNTER — ALLIED HEALTH/NURSE VISIT (OUTPATIENT)
Dept: FAMILY MEDICINE | Facility: CLINIC | Age: 68
End: 2018-10-08
Payer: MEDICARE

## 2018-10-08 DIAGNOSIS — Z23 NEED FOR PROPHYLACTIC VACCINATION AND INOCULATION AGAINST INFLUENZA: Primary | ICD-10-CM

## 2018-10-08 PROCEDURE — 90662 IIV NO PRSV INCREASED AG IM: CPT

## 2018-10-08 PROCEDURE — G0008 ADMIN INFLUENZA VIRUS VAC: HCPCS

## 2018-10-08 NOTE — MR AVS SNAPSHOT
After Visit Summary   10/8/2018    Quan Murphy    MRN: [id number]           Patient Information     Date Of Birth          1950        Visit Information        Provider Department      10/8/2018 2:00 PM MICHAEL MONTEZ MA [facility]        Today's Diagnoses     Need for prophylactic vaccination and inoculation against influenza    -  1       Follow-ups after your visit        Your next 10 appointments already scheduled     Nov 06, 2018  3:00 PM CST   Return Visit with Aramis Ch MD   [facility] ([facility])    [address] [place] [address] [phone]              Who to contact     If you have questions or need follow up information about today's clinic visit or your schedule please contact [facility] directly at [phone].  Normal or non-critical lab and imaging results will be communicated to you by MyChart, letter or phone within 4 business days after the clinic has received the results. If you do not hear from us within 7 days, please contact the clinic through [email or web address] or phone. If you have a critical or abnormal lab result, we will notify you by phone as soon as possible.  Submit refill requests through [employer] or call your pharmacy and they will forward the refill request to us. Please allow 3 business days for your refill to be completed.          Additional Information About Your Visit        MyChart Information     [employer] gives you secure access to your electronic health record. If you see a primary care provider, you can also send messages to your care team and make appointments. If you have questions, please call your primary care clinic.  If you do not have a primary care provider, please call [phone] and they will assist you.        Care EveryWhere ID     This is your Care EveryWhere ID. This could be used by other organizations to access your [facility]  records  [id number]         Blood Pressure from Last 3 Encounters:   06/20/18 136/86   05/09/18 106/58   11/01/17 100/66    Weight from Last 3 Encounters:   06/20/18 210 lb (95.3 kg)   05/09/18 205 lb (93 kg)   11/01/17 197 lb 6.4 oz (89.5 kg)              We Performed the Following     FLU VACCINE, INCREASED ANTIGEN, PRESV FREE, AGE 65+ [31409]     Vaccine Administration, Initial [79678]        Primary Care Provider Office Phone # Fax #    Jose Hernandez [phone]       [address] [facility] [address]        Equal Access to Services     MAGGIE HARRIS : Ramírez Aly, wajaime ortiz, qaybta kaalmada jemima, mitchell cox. So [facility] [phone].    ATENCIÓN: Si habla español, tiene a recinos disposición servicios gratuitos de asistencia lingüística. Llame al [phone].    We comply with applicable federal civil rights laws and [place] laws. We do not discriminate on the basis of race, color, national origin, age, disability, sex, sexual orientation, or gender identity.            Thank you!     Thank you for choosing [facility]  for your care. Our goal is always to provide you with excellent care. Hearing back from our patients is one way we can continue to improve our services. Please take a few minutes to complete the written survey that you may receive in the mail after your visit with us. Thank you!             Your Updated Medication List - Protect others around you: Learn how to safely use, store and throw away your medicines at www.disposemymeds.org.          This list is accurate as of 10/8/18  2:04 PM.  Always use your most recent med list.                   Brand Name Dispense Instructions for use Diagnosis    ALPRAZolam 0.5 MG tablet    XANAX    90 tablet    Take 1 tablet (0.5 mg) by mouth 3 times daily as needed for anxiety    Anxiety       aspirin 81 MG EC tablet     90 tablet    Take 1 tablet (81 mg) by  mouth daily    Benign essential hypertension       cetirizine 10 MG tablet    zyrTEC     Take 10 mg by mouth daily        CIALIS 20 MG tablet   Generic drug:  tadalafil      Take 20 mg by mouth daily as needed        citalopram 40 MG tablet    celeXA    30 tablet    Take 1 tablet (40 mg) by mouth daily    Anxiety       HYDROcodone-acetaminophen 7.5-325 MG per tablet    NORCO    120 tablet    Take 1 tablet by mouth every 6 hours as needed for moderate to severe pain maximum 4 tablet(s) per day    Kidney hematoma, unspecified laterality, initial encounter       hydrOXYzine 25 MG capsule    VISTARIL    60 capsule    Take 1 capsule (25 mg) by mouth 3 times daily as needed for anxiety    Arthropathy       lisinopril 5 MG tablet    PRINIVIL/ZESTRIL    90 tablet    Take 1 tablet (5 mg) by mouth daily    Hypertension goal BP (blood pressure) < 140/90       neomycin-polymyxin-hydrocortisone 3.5-13522-4 otic suspension    CORTISPORIN    10 mL    Place 4 drops Into the left ear 4 times daily    Acute otitis externa of left ear, unspecified type       nitroGLYcerin 0.4 MG sublingual tablet    NITROSTAT    25 tablet    For chest pain place 1 tablet under the tongue every 5 minutes for 3 doses. If symptoms persist 5 minutes after 1st dose call 911.    Atherosclerosis of native coronary artery of native heart with unstable angina pectoris (H)       olopatadine 0.1 % ophthalmic solution    PATANOL    5 mL    Place 1 drop into both eyes 2 times daily    Allergic conjunctivitis, bilateral       oxyCODONE IR 5 MG tablet    ROXICODONE    20 tablet    Take 1 tablet (5 mg) by mouth every 4 hours as needed for pain maximum 4 tablet(s) per day    Kidney hematoma, unspecified laterality, initial encounter       rosuvastatin 40 MG tablet    CRESTOR    90 tablet    Take 1 tablet (40 mg) by mouth daily    Hyperlipidemia LDL goal <130       zolpidem 10 MG tablet    AMBIEN    30 tablet    Take 1 tablet (10 mg) by mouth nightly as needed for  sleep    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, insomnia type

## 2018-10-08 NOTE — PROGRESS NOTES
Injectable Influenza Immunization Documentation    1.  Is the person to be vaccinated sick today?   No    2. Does the person to be vaccinated have an allergy to a component   of the vaccine?   No  Egg Allergy Algorithm Link    3. Has the person to be vaccinated ever had a serious reaction   to influenza vaccine in the past?   No    4. Has the person to be vaccinated ever had Guillain-Barré syndrome?   No    Form completed by Louann Harrell CMA  Prior to injection verified patient identity using patient's name and date of birth.  Due to injection administration, patient instructed to remain in clinic for 15 minutes  afterwards, and to report any adverse reaction to me immediately.

## 2018-10-17 ENCOUNTER — MYC REFILL (OUTPATIENT)
Dept: FAMILY MEDICINE | Facility: CLINIC | Age: 68
End: 2018-10-17

## 2018-10-17 ENCOUNTER — MYC MEDICAL ADVICE (OUTPATIENT)
Dept: FAMILY MEDICINE | Facility: CLINIC | Age: 68
End: 2018-10-17

## 2018-10-17 DIAGNOSIS — G47.01 SLEEP DISORDER DUE TO A GENERAL MEDICAL CONDITION, INSOMNIA TYPE: ICD-10-CM

## 2018-10-17 DIAGNOSIS — S37.019A KIDNEY HEMATOMA, UNSPECIFIED LATERALITY, INITIAL ENCOUNTER: ICD-10-CM

## 2018-10-17 RX ORDER — ZOLPIDEM TARTRATE 10 MG/1
10 TABLET ORAL
Qty: 30 TABLET | Refills: 5 | Status: CANCELLED | OUTPATIENT
Start: 2018-10-17

## 2018-10-17 RX ORDER — HYDROCODONE BITARTRATE AND ACETAMINOPHEN 7.5; 325 MG/1; MG/1
1 TABLET ORAL EVERY 6 HOURS PRN
Qty: 120 TABLET | Refills: 0 | Status: SHIPPED | OUTPATIENT
Start: 2018-10-17 | End: 2018-11-13

## 2018-10-17 NOTE — TELEPHONE ENCOUNTER
Message from MyChart:  Original authorizing provider: MD Quan Dean ALFREDAFortino Murphy would like a refill of the following medications:   HYDROcodone-acetaminophen (NORCO) 7.5-325 MG per tablet [Jose Hernandez MD]    Preferred pharmacy: [address]    Comment:        Last Written Prescription Date:  09/20/18  Last Fill Quantity: 120,   # refills: 0  Last Office Visit: 05/09/18  Future Office visit:    Next 5 appointments (look out 90 days)     Nov 06, 2018  3:00 PM CST   Return Visit with Aramis Ch MD   [facility] ([address] [address] [phone]                   Routing refill request to provider for review/approval because:  Drug not on the [facility], [facility] or  Health refill protocol or controlled substance

## 2018-10-18 RX ORDER — ZOLPIDEM TARTRATE 10 MG/1
10 TABLET ORAL
Qty: 30 TABLET | Refills: 5 | Status: SHIPPED | OUTPATIENT
Start: 2018-10-18 | End: 2019-04-02

## 2018-10-18 NOTE — TELEPHONE ENCOUNTER
Message from MyChart:  Original authorizing provider: MD Quan Dean ALFREDAFortino Kearnssalvatore would like a refill of the following medications:  zolpidem (AMBIEN) 10 MG tablet [Jose Hernandez MD]    Preferred pharmacy: [address]    Comment:  Received notice that this med had a refill. Bottle said no refill. Call in said no refill. Not sure where disconnect arose.        Last Written Prescription Date:  05/30/18  Last Fill Quantity: 30,   # refills: 5  Last Office Visit: 05/09/18  Future Office visit:    Next 5 appointments (look out 90 days)     Nov 06, 2018  3:00 PM CST   Return Visit with Aramis Ch MD   [facility] ([address] [id number] [phone]                   Routing refill request to provider for review/approval because:  Drug not on the G, P or  Health refill protocol or controlled substance

## 2018-11-06 ENCOUNTER — OFFICE VISIT (OUTPATIENT)
Dept: CARDIOLOGY | Facility: CLINIC | Age: 68
End: 2018-11-06
Payer: MEDICARE

## 2018-11-06 VITALS
RESPIRATION RATE: 12 BRPM | DIASTOLIC BLOOD PRESSURE: 62 MMHG | HEART RATE: 72 BPM | OXYGEN SATURATION: 97 % | HEIGHT: 69 IN | BODY MASS INDEX: 31.44 KG/M2 | SYSTOLIC BLOOD PRESSURE: 124 MMHG | WEIGHT: 212.3 LBS

## 2018-11-06 DIAGNOSIS — I25.750 CORONARY ARTERY DISEASE INVOLVING NATIVE ARTERY OF TRANSPLANTED HEART WITH UNSTABLE ANGINA PECTORIS (H): Primary | ICD-10-CM

## 2018-11-06 DIAGNOSIS — E78.5 HYPERLIPIDEMIA LDL GOAL <130: ICD-10-CM

## 2018-11-06 PROCEDURE — 99213 OFFICE O/P EST LOW 20 MIN: CPT | Performed by: INTERNAL MEDICINE

## 2018-11-06 ASSESSMENT — PAIN SCALES - GENERAL: PAINLEVEL: NO PAIN (0)

## 2018-11-06 NOTE — LETTER
11/6/2018      Jose Hernandez MD  [address] [facility] [address]      RE: Quan Murphy       Dear Colleague,    I had the pleasure of seeing Quan Murphy in the [facility].    Service Date: 11/06/2018      HISTORY OF PRESENT ILLNESS:  I had the opportunity to see Mr. Quan Murphy in Cardiology Clinic today at the [facility] in [place] for reevaluation of coronary artery disease and dyslipidemia.  He is a 68-year-old gentleman who had anginal symptoms including left shoulder and left arm pain in 12/2016.  He was transferred to [facility] where he was found to have a significant proximal LAD stenosis treated with angioplasty and stenting.  His echocardiogram was normal.  He showed no evidence of previous infarction.  Initially his blood pressure was low, and metoprolol and lisinopril were discontinued.  He remained on Crestor for treatment of his dyslipidemia, which seemed to be his primary cardiac risk factor.  His blood pressure was a little higher last year, and I started a low dose of lisinopril.  His blood pressure again was low, and he was lightheaded.  He eventually stopped that medication, and his blood pressures have been good.  He is not experiencing any recurrence of cardiac symptoms.  No chest pain, shortness of breath or lightheadedness at this point.      PHYSICAL EXAMINATION:  On examination today his blood pressure is 124/62, heart rate 72 and weight 212 pounds.  His lungs are clear.  His heart rhythm is regular.  He has no cardiac murmurs or carotid bruits.  He has no lower extremity edema.      IMPRESSIONS:  Mr. Quan Murphy is a 68-year-old gentleman with hypercholesterolemia which is currently well treated on Crestor.  His LDL is 63 and HDL 70.  He has undergone angioplasty and stenting of the proximal LAD but had no other significant residual coronary artery disease.  He is feeling well without any  recurrent cardiac symptoms, and I will continue his current medications.  I will have him follow up with me again in 1 year.      cc:   Jose Hernandez MD    [address]  [address]         AIDEN ALEXANDRE MD, [facility]             D: 2018   T: 2018   MT: NIDHI      Name:     CHUCHO MONTOYA   MRN:      [id number]        Account:      [id number]   :      1950           Service Date: 2018      Document: [id number]         Outpatient Encounter Prescriptions as of 2018   Medication Sig Dispense Refill     ALPRAZolam (XANAX) 0.5 MG tablet Take 1 tablet (0.5 mg) by mouth 3 times daily as needed for anxiety 90 tablet 1     aspirin 81 MG EC tablet Take 1 tablet (81 mg) by mouth daily 90 tablet 3     cetirizine (ZYRTEC) 10 MG tablet Take 10 mg by mouth daily       CIALIS 20 MG tablet Take 20 mg by mouth daily as needed        citalopram (CELEXA) 40 MG tablet Take 1 tablet (40 mg) by mouth daily 30 tablet 6     HYDROcodone-acetaminophen (NORCO) 7.5-325 MG per tablet Take 1 tablet by mouth every 6 hours as needed for moderate to severe pain maximum 4 tablet(s) per day 120 tablet 0     hydrOXYzine (VISTARIL) 25 MG capsule Take 1 capsule (25 mg) by mouth 3 times daily as needed for anxiety 60 capsule 0     olopatadine (PATANOL) 0.1 % ophthalmic solution Place 1 drop into both eyes 2 times daily 5 mL 3     rosuvastatin (CRESTOR) 40 MG tablet Take 1 tablet (40 mg) by mouth daily 90 tablet 3     zolpidem (AMBIEN) 10 MG tablet Take 1 tablet (10 mg) by mouth nightly as needed for sleep 30 tablet 5     neomycin-polymyxin-hydrocortisone (CORTISPORIN) 3.5-10698-4 otic suspension Place 4 drops Into the left ear 4 times daily (Patient not taking: Reported on 2018) 10 mL 0     nitroglycerin (NITROSTAT) 0.4 MG sublingual tablet For chest pain place 1 tablet under the tongue every 5 minutes for 3 doses. If symptoms persist 5 minutes after 1st dose call  911. 25 tablet 0     oxyCODONE (ROXICODONE) 5 MG IR tablet Take 1 tablet (5 mg) by mouth every 4 hours as needed for pain maximum 4 tablet(s) per day (Patient not taking: Reported on 11/6/2018) 20 tablet 0     [DISCONTINUED] lisinopril (PRINIVIL/ZESTRIL) 5 MG tablet Take 1 tablet (5 mg) by mouth daily (Patient not taking: Reported on 11/6/2018) 90 tablet 0     No facility-administered encounter medications on file as of 11/6/2018.        Again, thank you for allowing me to participate in the care of your patient.      Sincerely,    AIDEN ALEXANDRE MD     [facility]

## 2018-11-06 NOTE — LETTER
11/6/2018    Jose Hernandez MD  [address] [facility] [address]    RE: Quan Murphy       Dear Colleague,    I had the pleasure of seeing Quan Murphy in the [facility].    HPI and Plan:   See dictation    Orders Placed This Encounter   Procedures     Lipid Profile     ALT     Follow-Up with Cardiologist       No orders of the defined types were placed in this encounter.      Medications Discontinued During This Encounter   Medication Reason     lisinopril (PRINIVIL/ZESTRIL) 5 MG tablet          Encounter Diagnoses   Name Primary?     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) Yes       CURRENT MEDICATIONS:  Current Outpatient Prescriptions   Medication Sig Dispense Refill     ALPRAZolam (XANAX) 0.5 MG tablet Take 1 tablet (0.5 mg) by mouth 3 times daily as needed for anxiety 90 tablet 1     aspirin 81 MG EC tablet Take 1 tablet (81 mg) by mouth daily 90 tablet 3     cetirizine (ZYRTEC) 10 MG tablet Take 10 mg by mouth daily       CIALIS 20 MG tablet Take 20 mg by mouth daily as needed        citalopram (CELEXA) 40 MG tablet Take 1 tablet (40 mg) by mouth daily 30 tablet 6     HYDROcodone-acetaminophen (NORCO) 7.5-325 MG per tablet Take 1 tablet by mouth every 6 hours as needed for moderate to severe pain maximum 4 tablet(s) per day 120 tablet 0     hydrOXYzine (VISTARIL) 25 MG capsule Take 1 capsule (25 mg) by mouth 3 times daily as needed for anxiety 60 capsule 0     olopatadine (PATANOL) 0.1 % ophthalmic solution Place 1 drop into both eyes 2 times daily 5 mL 3     rosuvastatin (CRESTOR) 40 MG tablet Take 1 tablet (40 mg) by mouth daily 90 tablet 3     zolpidem (AMBIEN) 10 MG tablet Take 1 tablet (10 mg) by mouth nightly as needed for sleep 30 tablet 5     neomycin-polymyxin-hydrocortisone (CORTISPORIN) 3.5-26140-0 otic suspension Place 4 drops Into the left ear 4  times daily (Patient not taking: Reported on 11/6/2018) 10 mL 0     nitroglycerin (NITROSTAT) 0.4 MG sublingual tablet For chest pain place 1 tablet under the tongue every 5 minutes for 3 doses. If symptoms persist 5 minutes after 1st dose call 911. 25 tablet 0     oxyCODONE (ROXICODONE) 5 MG IR tablet Take 1 tablet (5 mg) by mouth every 4 hours as needed for pain maximum 4 tablet(s) per day (Patient not taking: Reported on 11/6/2018) 20 tablet 0       ALLERGIES     Allergies   Allergen Reactions     Animal Dander      Dust Mites      Pollen Extract      Smoke.        PAST MEDICAL HISTORY:  Past Medical History:   Diagnosis Date     Allergy, unspecified not elsewhere classified     Seasonal allergies, pollen, dust, smoke and animals     Antiplatelet or antithrombotic long-term use      Anxiety      Arthritis      Chest pain      Chronic sinusitis      Coronary atherosclerosis of unspecified type of vessel, native or graft     Coronary artery disease     Hyperlipidemia      Hypertension      Inguinal hernia      Kidney stones      Malignant neoplasm of prostate (H)     Prostate cancer     Prostate cancer (H)        PAST SURGICAL HISTORY:  Past Surgical History:   Procedure Laterality Date     ARTHRODESIS FOOT  7/23/2013    Procedure: ARTHRODESIS FOOT;  Great Toe Arthrodesis Left Foot;  Surgeon: Ash Gonzalez DPM;  Location: [facility] OR     ARTHRODESIS FOOT  6/10/2014    Procedure: ARTHRODESIS FOOT;  Surgeon: Ash Gonzalez DPM;  Location:  OR     C LAPAROSCOPY, SURGICAL PROSTATECTOMY, RETROPUBIC RADICAL, W/NERVE SPARING  11/30/2004    With full bilateral pelvic lymphadenectomy.  [facility].     C TOTAL KNEE ARTHROPLASTY  05/01/08    Left knee     COLONOSCOPY  10/7/2013    Procedure: COLONOSCOPY;  Colonoscopy;  Surgeon: Mike Fallon MD;  Location:  GI     EXTRACORPOREAL SHOCK WAVE LITHOTRIPSY (ESWL) Bilateral 10/18/2017    Procedure: EXTRACORPOREAL SHOCK WAVE LITHOTRIPSY (ESWL);  BILATERAL EXTRACORPOREAL  SHOCKWAVE LITHOTRIPSY ;  Surgeon: Meir Torres MD;  Location: [facility] OR     HC CORRECT BUNION,SIMPLE  08/11/2005    x3     HC REMV TOE BENIGN BONE LESN  08/11/2005     HERNIORRHAPHY INGUINAL  7/3/2013    Procedure: HERNIORRHAPHY INGUINAL;  Open Repair Inguinal hernia Right with mesh ;  Surgeon: Sam Escobar MD;  Location: [facility] OR     MOHS MICROGRAPHIC PROCEDURE  08/23/11    ear and chin-[facility]     OPEN REDUCTION INTERNAL FIXATION WRIST Right 7/18/2017    Procedure: OPEN REDUCTION INTERNAL FIXATION WRIST;  Right distal radius open reduction and internal fixation;  Surgeon: Pedro Blanca DO;  Location: [facility] OR     RECONSTRUCT FOREFOOT WITH METATARSOPHALANGEAL (MTP) FUSION  6/10/2014    Procedure: RECONSTRUCT FOREFOOT WITH METATARSOPHALANGEAL (MTP) FUSION;  Surgeon: Ash Gonzalez DPM;  Location: [facility] OR     STENT, CORONARY, DEMI       SURGICAL HISTORY OF -   1999/1974    lt knee     SURGICAL HISTORY OF -   10/2004    lithotripsy     SURGICAL HISTORY OF - 11/05    angiogram with stent       FAMILY HISTORY:  Family History   Problem Relation Age of Onset     Hypertension Father      has had MI      Connective Tissue Disorder Mother      LUPUS     HEART DISEASE Mother      poor valve-needing replacement       SOCIAL HISTORY:  Social History     Social History     Marital status:      Spouse name: Alessandra     Number of children: 2     Years of education: N/A     Social History Main Topics     Smoking status: Never Smoker     Smokeless tobacco: Never Used     Alcohol use 5.0 oz/week     10 Cans of beer per week      Comment: occasional      Drug use: No     Sexual activity: Yes     Partners: Female     Other Topics Concern     Not on file     Social History Narrative       Review of Systems:  Skin:  Negative       Eyes:  Negative      ENT:  Negative postnasal drainage chronic  Respiratory:  Negative       Cardiovascular:  Negative  "for;edema;lightheadedness;dizziness;fatigue;palpitations;chest pain Positive for    Gastroenterology: Negative      Genitourinary:  Positive for kidney stone problem with kidney stones will f/u with Dr. Wang  Musculoskeletal:  Positive for joint pain    Neurologic:  Negative      Psychiatric:  Negative sleep disturbances;anxiety;depression worries  Heme/Lymph/Imm:  Positive for allergies    Endocrine:  Negative        Physical Exam:  Vitals: /62 (BP Location: Right arm, Cuff Size: Adult Regular)  Pulse 72  Resp 12  Ht 1.74 m (5' 8.5\")  Wt 96.3 kg (212 lb 4.8 oz)  SpO2 97%  BMI 31.81 kg/m2    Constitutional:  cooperative, alert and oriented, well developed, well nourished, in no acute distress        Skin:  warm and dry to the touch, no apparent skin lesions or masses noted          Head:  normocephalic, no masses or lesions        Eyes:  pupils equal and round        Lymph:No Cervical lymphadenopathy present     ENT:  no pallor or cyanosis, dentition good        Neck:           Respiratory:  clear to auscultation;normal symmetry         Cardiac: regular rhythm;normal S1 and S2     no presence of murmur          pulses full and equal, no bruits auscultated                                   Groin site intact    GI:  abdomen soft;BS normoactive        Extremities and Muscular Skeletal:  no edema;no deformities, clubbing, cyanosis, erythema observed              Neurological:  no gross motor deficits;affect appropriate        Psych:  oriented to time, person and place        CC  No referring provider defined for this encounter.                Thank you for allowing me to participate in the care of your patient.      Sincerely,     AIDEN ALEXANDRE MD     [facility]    cc:   No referring provider defined for this encounter.        "

## 2018-11-06 NOTE — MR AVS SNAPSHOT
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)    -  1    Hyperlipidemia LDL goal <130           Follow-ups after your visit        Additional Services     Follow-Up with Cardiologist                 Future tests that were ordered for you today     Open Future Orders        Priority Expected Expires Ordered    Lipid Profile Routine 11/6/2019 11/6/2019 11/6/2018    ALT Routine 11/6/2019 11/6/2019 11/6/2018    Follow-Up with Cardiologist Routine 11/6/2019 11/7/2019 11/6/2018            Who to contact     If you have questions or need follow up information about today's clinic visit or your schedule please contact [facility] directly at [phone].  Normal or non-critical lab and imaging results will be communicated to you by [email or web address], letter or phone within 4 business days after the clinic has received the results. If you do not hear from us within 7 days, please contact the clinic through [employer] or phone. If you have a critical or abnormal lab result, we will notify you by phone as soon as possible.  Submit refill requests through [employer] or call your pharmacy and they will forward the refill request to us. Please allow 3 business days for your refill to be completed.          Additional Information About Your Visit        [email or web address] Information     [employer] gives you secure access to your electronic health record. If you see a primary care provider, you can also send messages to your care team and make appointments. If you have questions, please call your primary care clinic.  If you do not have  "a primary care provider, please call [phone] and they will assist you.        Care EveryWhere ID     This is your Care EveryWhere ID. This could be used by other organizations to access your [place] medical records  [id number]        Your Vitals Were     Pulse Respirations Height Pulse Oximetry BMI (Body Mass Index)       72 12 1.74 m (5' 8.5\") 97% 31.81 kg/m2        Blood Pressure from Last 3 Encounters:   11/06/18 124/62   06/20/18 136/86   05/09/18 106/58    Weight from Last 3 Encounters:   11/06/18 96.3 kg (212 lb 4.8 oz)   06/20/18 95.3 kg (210 lb)   05/09/18 93 kg (205 lb)                 Today's Medication Changes          These changes are accurate as of 11/6/18  3:33 PM.  If you have any questions, ask your nurse or doctor.               Stop taking these medicines if you haven't already. Please contact your care team if you have questions.     lisinopril 5 MG tablet   Commonly known as:  PRINIVIL/ZESTRIL                    Primary Care Provider Office Phone # Fax #    Jose Hernandez [phone]       [address] [facility] [id number]        Equal Access to Services     MAGGIE HARRIS : Hadii linda ku hadasho Soomaali, waaxda luqadaha, qaybta kaalmada adeegyada, mitchell cox. So [facility] [phone].    ATENCIÓN: Si habla español, tiene a recinos disposición servicios gratuitos de asistencia lingüística. Llame al [phone].    We comply with applicable federal civil rights laws and [place] laws. We do not discriminate on the basis of race, color, national origin, age, disability, sex, sexual orientation, or gender identity.            Thank you!     Thank you for choosing [facility]  for your care. Our goal is always to provide you with excellent care. Hearing back from our patients is one way we can continue to improve our services. Please take a few minutes to complete the written survey that you may " receive in the mail after your visit with us. Thank you!             Your Updated Medication List - Protect others around you: Learn how to safely use, store and throw away your medicines at www.disposemymeds.org.          This list is accurate as of 11/6/18  3:33 PM.  Always use your most recent med list.                   Brand Name Dispense Instructions for use Diagnosis    ALPRAZolam 0.5 MG tablet    XANAX    90 tablet    Take 1 tablet (0.5 mg) by mouth 3 times daily as needed for anxiety    Anxiety       aspirin 81 MG EC tablet     90 tablet    Take 1 tablet (81 mg) by mouth daily    Benign essential hypertension       cetirizine 10 MG tablet    zyrTEC     Take 10 mg by mouth daily        CIALIS 20 MG tablet   Generic drug:  tadalafil      Take 20 mg by mouth daily as needed        citalopram 40 MG tablet    celeXA    30 tablet    Take 1 tablet (40 mg) by mouth daily    Anxiety       HYDROcodone-acetaminophen 7.5-325 MG per tablet    NORCO    120 tablet    Take 1 tablet by mouth every 6 hours as needed for moderate to severe pain maximum 4 tablet(s) per day    Kidney hematoma, unspecified laterality, initial encounter       hydrOXYzine 25 MG capsule    VISTARIL    60 capsule    Take 1 capsule (25 mg) by mouth 3 times daily as needed for anxiety    Arthropathy       neomycin-polymyxin-hydrocortisone 3.5-04683-6 otic suspension    CORTISPORIN    10 mL    Place 4 drops Into the left ear 4 times daily    Acute otitis externa of left ear, unspecified type       nitroGLYcerin 0.4 MG sublingual tablet    NITROSTAT    25 tablet    For chest pain place 1 tablet under the tongue every 5 minutes for 3 doses. If symptoms persist 5 minutes after 1st dose call 911.    Atherosclerosis of native coronary artery of native heart with unstable angina pectoris (H)       olopatadine 0.1 % ophthalmic solution    PATANOL    5 mL    Place 1 drop into both eyes 2 times daily    Allergic conjunctivitis, bilateral       oxyCODONE IR 5 MG  tablet    ROXICODONE    20 tablet    Take 1 tablet (5 mg) by mouth every 4 hours as needed for pain maximum 4 tablet(s) per day    Kidney hematoma, unspecified laterality, initial encounter       rosuvastatin 40 MG tablet    CRESTOR    90 tablet    Take 1 tablet (40 mg) by mouth daily    Hyperlipidemia LDL goal <130       zolpidem 10 MG tablet    AMBIEN    30 tablet    Take 1 tablet (10 mg) by mouth nightly as needed for sleep    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, insomnia type

## 2018-11-06 NOTE — PROGRESS NOTES
HPI and Plan:   See dictation    Orders Placed This Encounter   Procedures     Lipid Profile     ALT     Follow-Up with Cardiologist       No orders of the defined types were placed in this encounter.      Medications Discontinued During This Encounter   Medication Reason     lisinopril (PRINIVIL/ZESTRIL) 5 MG tablet          Encounter Diagnoses   Name Primary?     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) Yes       CURRENT MEDICATIONS:  Current Outpatient Prescriptions   Medication Sig Dispense Refill     ALPRAZolam (XANAX) 0.5 MG tablet Take 1 tablet (0.5 mg) by mouth 3 times daily as needed for anxiety 90 tablet 1     aspirin 81 MG EC tablet Take 1 tablet (81 mg) by mouth daily 90 tablet 3     cetirizine (ZYRTEC) 10 MG tablet Take 10 mg by mouth daily       CIALIS 20 MG tablet Take 20 mg by mouth daily as needed        citalopram (CELEXA) 40 MG tablet Take 1 tablet (40 mg) by mouth daily 30 tablet 6     HYDROcodone-acetaminophen (NORCO) 7.5-325 MG per tablet Take 1 tablet by mouth every 6 hours as needed for moderate to severe pain maximum 4 tablet(s) per day 120 tablet 0     hydrOXYzine (VISTARIL) 25 MG capsule Take 1 capsule (25 mg) by mouth 3 times daily as needed for anxiety 60 capsule 0     olopatadine (PATANOL) 0.1 % ophthalmic solution Place 1 drop into both eyes 2 times daily 5 mL 3     rosuvastatin (CRESTOR) 40 MG tablet Take 1 tablet (40 mg) by mouth daily 90 tablet 3     zolpidem (AMBIEN) 10 MG tablet Take 1 tablet (10 mg) by mouth nightly as needed for sleep 30 tablet 5     neomycin-polymyxin-hydrocortisone (CORTISPORIN) 3.5-37631-4 otic suspension Place 4 drops Into the left ear 4 times daily (Patient not taking: Reported on 11/6/2018) 10 mL 0     nitroglycerin (NITROSTAT) 0.4 MG sublingual tablet For chest pain place 1 tablet under the tongue every 5 minutes for 3 doses. If symptoms persist 5 minutes  after 1st dose call 911. 25 tablet 0     oxyCODONE (ROXICODONE) 5 MG IR tablet Take 1 tablet (5 mg) by mouth every 4 hours as needed for pain maximum 4 tablet(s) per day (Patient not taking: Reported on 11/6/2018) 20 tablet 0       ALLERGIES     Allergies   Allergen Reactions     Animal Dander      Dust Mites      Pollen Extract      Smoke.        PAST MEDICAL HISTORY:  Past Medical History:   Diagnosis Date     Allergy, unspecified not elsewhere classified     Seasonal allergies, pollen, dust, smoke and animals     Antiplatelet or antithrombotic long-term use      Anxiety      Arthritis      Chest pain      Chronic sinusitis      Coronary atherosclerosis of unspecified type of vessel, native or graft     Coronary artery disease     Hyperlipidemia      Hypertension      Inguinal hernia      Kidney stones      Malignant neoplasm of prostate (H)     Prostate cancer     Prostate cancer (H)        PAST SURGICAL HISTORY:  Past Surgical History:   Procedure Laterality Date     ARTHRODESIS FOOT  7/23/2013    Procedure: ARTHRODESIS FOOT;  Great Toe Arthrodesis Left Foot;  Surgeon: Ash Gonzalez DPM;  Location: [facility] OR     ARTHRODESIS FOOT  6/10/2014    Procedure: ARTHRODESIS FOOT;  Surgeon: Ash Gonzalez DPM;  Location: [facility] OR     C LAPAROSCOPY, SURGICAL PROSTATECTOMY, RETROPUBIC RADICAL, W/NERVE SPARING  11/30/2004    With full bilateral pelvic lymphadenectomy.  [facility].     C TOTAL KNEE ARTHROPLASTY  05/01/08    Left knee     COLONOSCOPY  10/7/2013    Procedure: COLONOSCOPY;  Colonoscopy;  Surgeon: Mike Fallon MD;  Location: [facility] GI     EXTRACORPOREAL SHOCK WAVE LITHOTRIPSY (ESWL) Bilateral 10/18/2017    Procedure: EXTRACORPOREAL SHOCK WAVE LITHOTRIPSY (ESWL);  BILATERAL EXTRACORPOREAL SHOCKWAVE LITHOTRIPSY ;  Surgeon: Meir Torres MD;  Location: [facility] OR     HC CORRECT BUNION,SIMPLE  08/11/2005    x3     HC REMV TOE BENIGN BONE LESN  08/11/2005     HERNIORRHAPHY INGUINAL  7/3/2013    Procedure:  HERNIORRHAPHY INGUINAL;  Open Repair Inguinal hernia Right with mesh ;  Surgeon: Sam Escobar MD;  Location: [facility] OR     MOHS MICROGRAPHIC PROCEDURE  08/23/11    ear and chin-[facility]     OPEN REDUCTION INTERNAL FIXATION WRIST Right 7/18/2017    Procedure: OPEN REDUCTION INTERNAL FIXATION WRIST;  Right distal radius open reduction and internal fixation;  Surgeon: Pedro Blanca DO;  Location: [facility]     RECONSTRUCT FOREFOOT WITH METATARSOPHALANGEAL (MTP) FUSION  6/10/2014    Procedure: RECONSTRUCT FOREFOOT WITH METATARSOPHALANGEAL (MTP) FUSION;  Surgeon: Ash Gonzalez DPM;  Location: [facility]     STENT, CORONARY, DEMI       SURGICAL HISTORY OF -   1999/1974    lt knee     SURGICAL HISTORY OF -   10/2004    lithotripsy     SURGICAL HISTORY OF - 11/05    angiogram with stent       FAMILY HISTORY:  Family History   Problem Relation Age of Onset     Hypertension Father      has had MI      Connective Tissue Disorder Mother      LUPUS     HEART DISEASE Mother      poor valve-needing replacement       SOCIAL HISTORY:  Social History     Social History     Marital status:      Spouse name: Alessandra     Number of children: 2     Years of education: N/A     Social History Main Topics     Smoking status: Never Smoker     Smokeless tobacco: Never Used     Alcohol use 5.0 oz/week     10 Cans of beer per week      Comment: occasional      Drug use: No     Sexual activity: Yes     Partners: Female     Other Topics Concern     Not on file     Social History Narrative       Review of Systems:  Skin:  Negative       Eyes:  Negative      ENT:  Negative postnasal drainage chronic  Respiratory:  Negative       Cardiovascular:  Negative for;edema;lightheadedness;dizziness;fatigue;palpitations;chest pain Positive for    Gastroenterology: Negative      Genitourinary:  Positive for kidney stone problem with kidney stones will f/u with Dr. Wang  Musculoskeletal:  Positive for joint pain    Neurologic:   "Negative      Psychiatric:  Negative sleep disturbances;anxiety;depression worries  Heme/Lymph/Imm:  Positive for allergies    Endocrine:  Negative        Physical Exam:  Vitals: /62 (BP Location: Right arm, Cuff Size: Adult Regular)  Pulse 72  Resp 12  Ht 1.74 m (5' 8.5\")  Wt 96.3 kg (212 lb 4.8 oz)  SpO2 97%  BMI 31.81 kg/m2    Constitutional:  cooperative, alert and oriented, well developed, well nourished, in no acute distress        Skin:  warm and dry to the touch, no apparent skin lesions or masses noted          Head:  normocephalic, no masses or lesions        Eyes:  pupils equal and round        Lymph:No Cervical lymphadenopathy present     ENT:  no pallor or cyanosis, dentition good        Neck:           Respiratory:  clear to auscultation;normal symmetry         Cardiac: regular rhythm;normal S1 and S2     no presence of murmur          pulses full and equal, no bruits auscultated                                   Groin site intact    GI:  abdomen soft;BS normoactive        Extremities and Muscular Skeletal:  no edema;no deformities, clubbing, cyanosis, erythema observed              Neurological:  no gross motor deficits;affect appropriate        Psych:  oriented to time, person and place        CC  No referring provider defined for this encounter.              "

## 2018-11-07 NOTE — PROGRESS NOTES
Service Date: 11/06/2018      HISTORY OF PRESENT ILLNESS:  I had the opportunity to see Mr. Quan Murphy in Cardiology Clinic today at the [facility] in [place] for reevaluation of coronary artery disease and dyslipidemia.  He is a 68-year-old gentleman who had anginal symptoms including left shoulder and left arm pain in 12/2016.  He was transferred to [facility] where he was found to have a significant proximal LAD stenosis treated with angioplasty and stenting.  His echocardiogram was normal.  He showed no evidence of previous infarction.  Initially his blood pressure was low, and metoprolol and lisinopril were discontinued.  He remained on Crestor for treatment of his dyslipidemia, which seemed to be his primary cardiac risk factor.  His blood pressure was a little higher last year, and I started a low dose of lisinopril.  His blood pressure again was low, and he was lightheaded.  He eventually stopped that medication, and his blood pressures have been good.  He is not experiencing any recurrence of cardiac symptoms.  No chest pain, shortness of breath or lightheadedness at this point.      PHYSICAL EXAMINATION:  On examination today his blood pressure is 124/62, heart rate 72 and weight 212 pounds.  His lungs are clear.  His heart rhythm is regular.  He has no cardiac murmurs or carotid bruits.  He has no lower extremity edema.      IMPRESSIONS:  Mr. Quan Murphy is a 68-year-old gentleman with hypercholesterolemia which is currently well treated on Crestor.  His LDL is 63 and HDL 70.  He has undergone angioplasty and stenting of the proximal LAD but had no other significant residual coronary artery disease.  He is feeling well without any recurrent cardiac symptoms, and I will continue his current medications.  I will have him follow up with me again in 1 year.      cc:   Jose Hernandez MD    [address]   [id number]         AIDEN ALEXANDRE MD, [facility]             D: 2018   T: 2018   MT: NIDHI      Name:     CHUCHO MONTOYA   MRN:      [id number]        Account:      [id number]   :      1950           Service Date: 2018      Document: [id number]

## 2018-11-13 ENCOUNTER — MYC REFILL (OUTPATIENT)
Dept: FAMILY MEDICINE | Facility: CLINIC | Age: 68
End: 2018-11-13

## 2018-11-13 DIAGNOSIS — F41.9 ANXIETY: ICD-10-CM

## 2018-11-13 DIAGNOSIS — S37.019A KIDNEY HEMATOMA, UNSPECIFIED LATERALITY, INITIAL ENCOUNTER: ICD-10-CM

## 2018-11-14 RX ORDER — HYDROCODONE BITARTRATE AND ACETAMINOPHEN 7.5; 325 MG/1; MG/1
1 TABLET ORAL EVERY 6 HOURS PRN
Qty: 120 TABLET | Refills: 0 | Status: SHIPPED | OUTPATIENT
Start: 2018-11-14 | End: 2018-12-11

## 2018-11-14 RX ORDER — ALPRAZOLAM 0.5 MG
0.5 TABLET ORAL 3 TIMES DAILY PRN
Qty: 90 TABLET | Refills: 1 | Status: SHIPPED | OUTPATIENT
Start: 2018-11-14 | End: 2019-01-08

## 2018-11-14 NOTE — TELEPHONE ENCOUNTER
Message from MyChart:  Original authorizing provider: MD Quan Dean would like a refill of the following medications:  ALPRAZolam (XANAX) 0.5 MG tablet [Jose Hernandez MD]  HYDROcodone-acetaminophen (NORCO) 7.5-325 MG per tablet [Jose Hernandez MD]    Preferred pharmacy: [address]     Comment:

## 2018-11-14 NOTE — TELEPHONE ENCOUNTER
norco      Last Written Prescription Date:  10/17/18  Last Fill Quantity: 120,   # refills: 0  Last Office Visit: 5/09/18  Future Office visit:       Routing refill request to provider for review/approval because:  Drug not on the [facility], [facility] or [facility] refill protocol or controlled substance    xanax      Last Written Prescription Date:  9/20/18  Last Fill Quantity: 90,   # refills: 1  Last Office Visit: 5/09/18  Future Office visit:       Routing refill request to provider for review/approval because:  Drug not on the [facility], [facility] or [facility] refill protocol or controlled substance

## 2018-12-11 ENCOUNTER — MYC REFILL (OUTPATIENT)
Dept: FAMILY MEDICINE | Facility: CLINIC | Age: 68
End: 2018-12-11

## 2018-12-11 DIAGNOSIS — S37.019A KIDNEY HEMATOMA, UNSPECIFIED LATERALITY, INITIAL ENCOUNTER: ICD-10-CM

## 2018-12-11 RX ORDER — HYDROCODONE BITARTRATE AND ACETAMINOPHEN 7.5; 325 MG/1; MG/1
1 TABLET ORAL EVERY 6 HOURS PRN
Qty: 120 TABLET | Refills: 0 | Status: SHIPPED | OUTPATIENT
Start: 2018-12-11 | End: 2019-01-08

## 2018-12-11 NOTE — TELEPHONE ENCOUNTER
Norco 7.5-325 MG       Last Written Prescription Date:  11/14/18  Last Fill Quantity: 120,   # refills: 0  Last Office Visit: 5/9/18  Future Office visit:       Routing refill request to provider for review/approval because:  Drug not on the [facility], [facility] or [facility] refill protocol or controlled substance

## 2018-12-12 ENCOUNTER — MYC MEDICAL ADVICE (OUTPATIENT)
Dept: FAMILY MEDICINE | Facility: CLINIC | Age: 68
End: 2018-12-12

## 2018-12-13 NOTE — TELEPHONE ENCOUNTER
Left message for patient to return call. Per PCP patient needs to schedule next available spot to discuss medication. Please assist patient in this when returns call.  Thank you  Gretta Barron MA

## 2018-12-13 NOTE — TELEPHONE ENCOUNTER
Patient returned call, message below per Gretta was relayed, no further questions, patient states he will schedule an appt through [facility].  Thank you,  Aditi Robledo  Patient Representative

## 2018-12-15 ENCOUNTER — MYC MEDICAL ADVICE (OUTPATIENT)
Dept: FAMILY MEDICINE | Facility: CLINIC | Age: 68
End: 2018-12-15

## 2018-12-26 ENCOUNTER — E-VISIT (OUTPATIENT)
Dept: FAMILY MEDICINE | Facility: CLINIC | Age: 68
End: 2018-12-26
Payer: MEDICARE

## 2018-12-26 DIAGNOSIS — J01.00 ACUTE MAXILLARY SINUSITIS, RECURRENCE NOT SPECIFIED: Primary | ICD-10-CM

## 2018-12-26 PROCEDURE — 99207 ZZC NO BILLABLE SERVICE THIS VISIT: CPT | Performed by: FAMILY MEDICINE

## 2018-12-28 ENCOUNTER — MYC MEDICAL ADVICE (OUTPATIENT)
Dept: FAMILY MEDICINE | Facility: CLINIC | Age: 68
End: 2018-12-28

## 2018-12-28 RX ORDER — AMOXICILLIN 875 MG
875 TABLET ORAL 2 TIMES DAILY
Qty: 20 TABLET | Refills: 0 | Status: SHIPPED | OUTPATIENT
Start: 2018-12-28 | End: 2019-01-07

## 2018-12-28 NOTE — PATIENT INSTRUCTIONS
Thank you for choosing us for your care. I have placed an order for a prescription so that you can start treatment. View your full visit summary for details by clicking on the link below. Your pharmacist will able to address any questions you may have about the medication.     If you're not feeling better within 5-7 days, please schedule an appointment.  You can schedule an appointment right here in [place], or call [phone]  If the visit is for the same symptoms as your e-visit, we'll refund the cost of your e-visit if seen within seven days.      Sinusitis (Antibiotic Treatment)    The sinuses are air-filled spaces within the bones of the face. They connect to the inside of the nose. Sinusitis is an inflammation of the tissue that lines the sinuses. Sinusitis can occur during a cold. It can also happen due to allergies to pollens and other particles in the air. Sinusitis can cause symptoms of sinus congestion and a feeling of fullness. A sinus infection causes fever, headache, and facial pain. There is often green or yellow fluid draining from the nose or into the back of the throat (post-nasal drip). You have been given antibiotics to treat this condition.  Home care    Take the full course of antibiotics as instructed. Do not stop taking them, even when you feel better.    Drink plenty of water, hot tea, and other liquids. This may help thin nasal mucus. It also may help your sinuses drain fluids.    Heat may help soothe painful areas of your face. Use a towel soaked in hot water. Or,  the shower and direct the warm spray onto your face. Using a vaporizer along with a menthol rub at night may also help soothe symptoms.     An expectorant with guaifenesin may help thin nasal mucus and help your sinuses drain fluids.    You can use an over-the-counter decongestant, unless a similar medicine was prescribed to you. Nasal sprays work the fastest. Use one that contains phenylephrine or oxymetazoline.  First blow your nose gently. Then use the spray. Do not use these medicines more often than directed on the label. If you do, your symptoms may get worse. You may also take pills that contain pseudoephedrine. Don t use products that combine multiple medicines. This is because side effects may be increased. Read labels. You can also ask the pharmacist for help. (People with high blood pressure should not use decongestants. They can raise blood pressure.)    Over-the-counter antihistamines may help if allergies contributed to your sinusitis.      Do not use nasal rinses or irrigation during an acute sinus infection, unless your healthcare provider tells you to. Rinsing may spread the infection to other areas in your sinuses.    Use acetaminophen or ibuprofen to control pain, unless another pain medicine was prescribed to you. If you have chronic liver or kidney disease or ever had a stomach ulcer, talk with your healthcare provider before using these medicines. (Aspirin should never be taken by anyone under age 18 who is ill with a fever. It may cause severe liver damage.)    Don't smoke. This can make symptoms worse.  Follow-up care  Follow up with your healthcare provider or our staff if you are better in 1 week.  When to seek medical advice  Call your healthcare provider if any of these occur:    Facial pain or headache that gets worse    Stiff neck    Unusual drowsiness or confusion    Swelling of your forehead or eyelids    Vision problems, such as blurred or double vision    Fever of 100.4 F (38 C) or higher, or as directed by your healthcare provider    Seizure    Breathing problems    Symptoms don't go away in 10 days  Prevention  Here are steps you can take to help prevent an infection:    Keep good hand washing habits.    Don t have close contact with people who have sore throats, colds, or other upper respiratory infections.    Don t smoke, and stay away from secondhand smoke.    Stay up to date with of  your vaccines.  Date Last Reviewed: 11/1/2017 2000-2018 [employer]. [address] [place] [address], [place] [address]. All rights reserved. This information is not intended as a substitute for professional medical care. Always follow your healthcare professional's instructions.

## 2019-01-08 ENCOUNTER — MYC REFILL (OUTPATIENT)
Dept: FAMILY MEDICINE | Facility: CLINIC | Age: 69
End: 2019-01-08

## 2019-01-08 DIAGNOSIS — F41.9 ANXIETY: ICD-10-CM

## 2019-01-08 DIAGNOSIS — S37.019A KIDNEY HEMATOMA, UNSPECIFIED LATERALITY, INITIAL ENCOUNTER: ICD-10-CM

## 2019-01-09 ENCOUNTER — CARE COORDINATION (OUTPATIENT)
Dept: CARDIOLOGY | Facility: CLINIC | Age: 69
End: 2019-01-09

## 2019-01-09 DIAGNOSIS — I10 BENIGN ESSENTIAL HYPERTENSION: Primary | ICD-10-CM

## 2019-01-09 RX ORDER — ALPRAZOLAM 0.5 MG
0.5 TABLET ORAL 3 TIMES DAILY PRN
Qty: 90 TABLET | Refills: 1 | Status: SHIPPED | OUTPATIENT
Start: 2019-01-09 | End: 2019-03-01

## 2019-01-09 RX ORDER — HYDROCODONE BITARTRATE AND ACETAMINOPHEN 7.5; 325 MG/1; MG/1
1 TABLET ORAL EVERY 6 HOURS PRN
Qty: 120 TABLET | Refills: 0 | Status: SHIPPED | OUTPATIENT
Start: 2019-01-09 | End: 2019-02-05

## 2019-01-09 NOTE — PROGRESS NOTES
Received refill request for pt's lisinopril 5 mg daily. Lisinopril no longer on medication list, and it looks like it was stopped at 11/6/18 OV with . I left message with pt to verify if he stopped his lisinopril. Nettie MIRANDA January 9, 2019, 3:08 PM

## 2019-01-14 RX ORDER — LISINOPRIL 5 MG/1
5 TABLET ORAL DAILY
Qty: 90 TABLET | Refills: 3 | Status: SHIPPED | OUTPATIENT
Start: 2019-01-14 | End: 2019-11-12

## 2019-01-14 NOTE — PROGRESS NOTES
Pt called back and confirmed that he is taking lisinopril 5 mg daily. He said he was never instructed to stop it. I don't see it mentioned in 's last OV note that he should stop it. Script sent to [facility]. Nettie MIRANDA January 14, 2019, 3:27 PM

## 2019-01-28 DIAGNOSIS — K21.00 GASTROESOPHAGEAL REFLUX DISEASE WITH ESOPHAGITIS: Primary | ICD-10-CM

## 2019-01-28 RX ORDER — OMEPRAZOLE 40 MG/1
40 CAPSULE, DELAYED RELEASE ORAL DAILY
Qty: 90 CAPSULE | Refills: 0 | Status: SHIPPED | OUTPATIENT
Start: 2019-01-28 | End: 2019-06-04

## 2019-01-28 NOTE — TELEPHONE ENCOUNTER
Please see [facility] message below.  Pt scheduled self an appt thru [facility] want to make sure they are ok to wait until 2/1/2019    Appointment For: Quan Murphy ([id number])   Visit Type: MYCHART OFFICE VISIT SHORT (91)      2/1/2019     2:20 PM  20 mins.  Jose Hernandez MD     [facility]      Patient Comments:      Stomach pain has resumed.  Worse at certain times.  Not looking for any increases in medication.  Would like however to see if there is anything that would help relax GI track, hopefully without causing drowsiness.

## 2019-01-28 NOTE — TELEPHONE ENCOUNTER
Called and left message for patient. Please inform a prescription of omeprazole was sent to Dr. Hernandez for approval once approved will be sent to  pharmacy.  Thank you  Gretta Barron MA

## 2019-02-02 DIAGNOSIS — E78.5 HYPERLIPIDEMIA LDL GOAL <130: ICD-10-CM

## 2019-02-04 RX ORDER — ROSUVASTATIN CALCIUM 40 MG/1
40 TABLET, COATED ORAL DAILY
Qty: 90 TABLET | Refills: 3 | Status: SHIPPED | OUTPATIENT
Start: 2019-02-04 | End: 2020-01-23

## 2019-02-05 ENCOUNTER — MYC REFILL (OUTPATIENT)
Dept: FAMILY MEDICINE | Facility: CLINIC | Age: 69
End: 2019-02-05

## 2019-02-05 DIAGNOSIS — S37.019A KIDNEY HEMATOMA, UNSPECIFIED LATERALITY, INITIAL ENCOUNTER: ICD-10-CM

## 2019-02-05 NOTE — TELEPHONE ENCOUNTER
Norco 7.5-325 MG       Last Written Prescription Date:  1/9/19  Last Fill Quantity: 120,   # refills: 0  Last Office Visit: 5/9/18  Future Office visit:       Routing refill request to provider for review/approval because:  Drug not on the [facility], [facility] or [facility] refill protocol or controlled substance

## 2019-02-06 RX ORDER — HYDROCODONE BITARTRATE AND ACETAMINOPHEN 7.5; 325 MG/1; MG/1
1 TABLET ORAL EVERY 6 HOURS PRN
Qty: 120 TABLET | Refills: 0 | Status: SHIPPED | OUTPATIENT
Start: 2019-02-06 | End: 2019-03-01

## 2019-02-14 DIAGNOSIS — I10 BENIGN ESSENTIAL HYPERTENSION: ICD-10-CM

## 2019-02-15 RX ORDER — ASPIRIN 81 MG/1
TABLET, COATED ORAL
Qty: 90 TABLET | Refills: 1 | Status: SHIPPED | OUTPATIENT
Start: 2019-02-15 | End: 2019-09-02

## 2019-02-15 NOTE — TELEPHONE ENCOUNTER
"asa  Last Written Prescription Date:  2/26/2018  Last Fill Quantity: 90,  # refills: 3   Last office visit: 10/8/2018 with prescribing provider:      Future Office Visit:      Requested Prescriptions   Pending Prescriptions Disp Refills     ASPIRIN LOW DOSE 81 MG EC tablet [Pharmacy Med Name: ASPIRIN LOW DOSE 81MG TBEC] 90 tablet 3     Sig: TAKE ONE TABLET BY MOUTH EVERY DAY    Analgesics (Non-Narcotic Tylenol and ASA Only) Passed - 2/14/2019  4:25 PM       Passed - Recent (12 mo) or future (30 days) visit within the authorizing provider's specialty    Patient had office visit in the last 12 months or has a visit in the next 30 days with authorizing provider or within the authorizing provider's specialty.  See \"Patient Info\" tab in inbasket, or \"Choose Columns\" in Meds & Orders section of the refill encounter.             Passed - Patient is age 20 years or older    If ASA is flagged for ages under 20 years old. Forward to provider for confirmation Ryes Syndrome is not a concern.             Passed - Medication is active on med list          Prescription approved per [facility] Refill Protocol.      Nichol Hernandez RN on 2/15/2019 at 3:13 PM      "

## 2019-02-26 ENCOUNTER — TELEPHONE (OUTPATIENT)
Dept: FAMILY MEDICINE | Facility: CLINIC | Age: 69
End: 2019-02-26

## 2019-02-26 NOTE — TELEPHONE ENCOUNTER
Prior Authorization Not Needed per Insurance    Medication: HYDROcodone-acetaminophen (NORCO) 7.5-325 MG per tablet - PA NOT NEEDED PER INS  Insurance Company: [employer] - Phone [phone] Fax [phone]  Expected CoPay:      Pharmacy Filling the Rx: [facility] [address]   Pharmacy Notified:  NO  Patient Notified:  NO    I received a phone call from [facility] from their PA department, the representative's name is Louann and the patient initiated their own PA.  I just answered the clinical questions with her on the phone and it was approved based on the answers.  She did state that she we should receive the approval letter to the PA department directly ([phone]).  I called the pharmacy so that they have documentation that it has been approved.  I also called the patient to let him know.  It was a very generic message that I left and I did leave the PA Team's direct phone number if he has further questions [phone]

## 2019-02-26 NOTE — TELEPHONE ENCOUNTER
Prior Authorization Approval    Authorization Effective Date: 11/28/2018  Authorization Expiration Date: 2/26/2020  Medication: zolpidem (AMBIEN) 10 MG tablet -PA APPROVED 02/26/2019  Approved Dose/Quantity:   Reference #: [id number]   Insurance Company: [facility] [employer] - Phone [phone] Fax [phone]  Expected CoPay:       CoPay Card Available:      Foundation Assistance Needed:    Which Pharmacy is filling the prescription (Not needed for infusion/clinic administered): [facility] [address]   Pharmacy Notified: Yes  Patient Notified: Yes    I received a phone call from [facility] from their PA department, the representative's name is Louann and the patient initiated their own PA.  I just answered the clinical questions with her on the phone and it was approved based on the answers.  She did state that she we should receive the approval letter to the PA department directly ([phone]).  I called the pharmacy so that they have documentation that it has been approved.  I also called the patient to let him know.  It was a very generic message that I left and I did leave the PA Team's direct phone number if he has further questions [phone].     Once the approval letter has been received, I will update the documentation.

## 2019-03-01 ENCOUNTER — MYC REFILL (OUTPATIENT)
Dept: FAMILY MEDICINE | Facility: CLINIC | Age: 69
End: 2019-03-01

## 2019-03-01 DIAGNOSIS — S37.019A KIDNEY HEMATOMA, UNSPECIFIED LATERALITY, INITIAL ENCOUNTER: ICD-10-CM

## 2019-03-01 DIAGNOSIS — F41.9 ANXIETY: ICD-10-CM

## 2019-03-01 RX ORDER — HYDROCODONE BITARTRATE AND ACETAMINOPHEN 7.5; 325 MG/1; MG/1
1 TABLET ORAL EVERY 6 HOURS PRN
Qty: 120 TABLET | Refills: 0 | Status: SHIPPED | OUTPATIENT
Start: 2019-03-01 | End: 2019-04-02

## 2019-03-01 RX ORDER — ALPRAZOLAM 0.5 MG
0.5 TABLET ORAL 3 TIMES DAILY PRN
Qty: 90 TABLET | Refills: 1 | Status: SHIPPED | OUTPATIENT
Start: 2019-03-01 | End: 2019-04-30

## 2019-03-01 NOTE — TELEPHONE ENCOUNTER
Hydrocodone and alprazolam      Last Written Prescription Date:  02/06/19-hydrocodone, 01/09/19  Last Fill Quantity: 120,90   # refills: 0, 1  Last Office Visit: 05/09/18  Future Office visit:    Next 5 appointments (look out 90 days)    Mar 15, 2019  2:00 PM CDT  Kash Contreras with Jose Hernandez MD  [facility] [address] [place] [id number] [phone]           Routing refill request to provider for review/approval because:  Drug not on the [facility], [facility] or  Health refill protocol or controlled substance

## 2019-03-15 ENCOUNTER — OFFICE VISIT (OUTPATIENT)
Dept: FAMILY MEDICINE | Facility: CLINIC | Age: 69
End: 2019-03-15
Payer: MEDICARE

## 2019-03-15 VITALS
HEART RATE: 80 BPM | WEIGHT: 216.2 LBS | OXYGEN SATURATION: 97 % | DIASTOLIC BLOOD PRESSURE: 64 MMHG | TEMPERATURE: 97.9 F | HEIGHT: 69 IN | RESPIRATION RATE: 18 BRPM | SYSTOLIC BLOOD PRESSURE: 122 MMHG | BODY MASS INDEX: 32.02 KG/M2

## 2019-03-15 DIAGNOSIS — S37.019A KIDNEY HEMATOMA, UNSPECIFIED LATERALITY, INITIAL ENCOUNTER: ICD-10-CM

## 2019-03-15 DIAGNOSIS — M12.9 ARTHROPATHY: ICD-10-CM

## 2019-03-15 DIAGNOSIS — G47.01 SLEEP DISORDER DUE TO A GENERAL MEDICAL CONDITION, INSOMNIA TYPE: ICD-10-CM

## 2019-03-15 DIAGNOSIS — Z00.00 ROUTINE GENERAL MEDICAL EXAMINATION AT A HEALTH CARE FACILITY: Primary | ICD-10-CM

## 2019-03-15 DIAGNOSIS — F41.9 ANXIETY: ICD-10-CM

## 2019-03-15 PROCEDURE — G0438 PPPS, INITIAL VISIT: HCPCS | Performed by: FAMILY MEDICINE

## 2019-03-15 RX ORDER — ALPRAZOLAM 0.5 MG
0.5 TABLET ORAL 3 TIMES DAILY PRN
Qty: 90 TABLET | Refills: 1 | Status: CANCELLED | OUTPATIENT
Start: 2019-03-15

## 2019-03-15 RX ORDER — ZOLPIDEM TARTRATE 10 MG/1
10 TABLET ORAL
Qty: 30 TABLET | Refills: 5 | Status: CANCELLED | OUTPATIENT
Start: 2019-03-15

## 2019-03-15 RX ORDER — HYDROXYZINE PAMOATE 25 MG/1
25 CAPSULE ORAL 3 TIMES DAILY PRN
Qty: 60 CAPSULE | Refills: 0 | Status: SHIPPED | OUTPATIENT
Start: 2019-03-15 | End: 2019-04-02

## 2019-03-15 RX ORDER — HYDROCODONE BITARTRATE AND ACETAMINOPHEN 7.5; 325 MG/1; MG/1
1 TABLET ORAL EVERY 6 HOURS PRN
Qty: 120 TABLET | Refills: 0 | Status: CANCELLED | OUTPATIENT
Start: 2019-03-15

## 2019-03-15 ASSESSMENT — ENCOUNTER SYMPTOMS
DYSURIA: 0
WEAKNESS: 0
HEADACHES: 0
DIZZINESS: 0
NAUSEA: 0
PARESTHESIAS: 0
CHILLS: 0
EYE PAIN: 0
ARTHRALGIAS: 1
DIARRHEA: 0
ABDOMINAL PAIN: 0
HEMATOCHEZIA: 0
COUGH: 0
SHORTNESS OF BREATH: 0
CONSTIPATION: 0
FEVER: 0
SORE THROAT: 0
FREQUENCY: 0
MYALGIAS: 0
NERVOUS/ANXIOUS: 1
HEMATURIA: 0
HEARTBURN: 1
JOINT SWELLING: 1

## 2019-03-15 ASSESSMENT — MIFFLIN-ST. JEOR: SCORE: 1741.31

## 2019-03-15 ASSESSMENT — ACTIVITIES OF DAILY LIVING (ADL): CURRENT_FUNCTION: NO ASSISTANCE NEEDED

## 2019-03-15 NOTE — PATIENT INSTRUCTIONS
Preventive Health Recommendations:     See your health care provider every year to    Review health changes.     Discuss preventive care.      Review your medicines if your doctor has prescribed any.    Talk with your health care provider about whether you should have a test to screen for prostate cancer (PSA).    Every 3 years, have a diabetes test (fasting glucose). If you are at risk for diabetes, you should have this test more often.    Every 5 years, have a cholesterol test. Have this test more often if you are at risk for high cholesterol or heart disease.     Every 10 years, have a colonoscopy. Or, have a yearly FIT test (stool test). These exams will check for colon cancer.    Talk to with your health care provider about screening for Abdominal Aortic Aneurysm if you have a family history of AAA or have a history of smoking.  Shots:     Get a flu shot each year.     Get a tetanus shot every 10 years.     Talk to your doctor about your pneumonia vaccines. There are now two you should receive - Pneumovax (PPSV 23) and Prevnar (PCV 13).    Talk to your pharmacist about a shingles vaccine.     Talk to your doctor about the hepatitis B vaccine.  Nutrition:     Eat at least 5 servings of fruits and vegetables each day.     Eat whole-grain bread, whole-wheat pasta and brown rice instead of white grains and rice.     Get adequate Calcium and Vitamin D.   Lifestyle    Exercise for at least 150 minutes a week (30 minutes a day, 5 days a week). This will help you control your weight and prevent disease.     Limit alcohol to one drink per day.     No smoking.     Wear sunscreen to prevent skin cancer.     See your dentist every six months for an exam and cleaning.     See your eye doctor every 1 to 2 years to screen for conditions such as glaucoma, macular degeneration and cataracts.    Personalized Prevention Plan  You are due for the preventive services outlined below.  Your care team is available to assist you in  scheduling these services.  If you have already completed any of these items, please share that information with your care team to update in your medical record.    Health Maintenance Due   Topic Date Due     Zoster (Shingles) Vaccine (2 of 3) 03/30/2016     Annual Wellness Visit  07/31/2016     FALL RISK ASSESSMENT  01/05/2018     Discuss Advance Directive Planning  05/24/2018

## 2019-03-15 NOTE — PROGRESS NOTES
"SUBJECTIVE:   Quan Murphy is a 68 year old male who presents for Preventive Visit.      Are you in the first 12 months of your Medicare coverage?  No    Annual Wellness Visit     In general, how would you rate your overall health?  Good    Frequency of exercise:  2-3 days/week    Do you usually eat at least 4 servings of fruit and vegetables a day, include whole grains    & fiber and avoid regularly eating high fat or \"junk\" foods?  Yes    Taking medications regularly:  Yes    Medication side effects:  None    Ability to successfully perform activities of daily living:  No assistance needed    Home Safety:  No safety concerns identified    Hearing Impairment:  No hearing concerns    In the past 6 months, have you been bothered by leaking of urine?  No    In general, how would you rate your overall mental or emotional health?  Good    PHQ-2 Total Score: 0    Additional concerns today:  No    Do you feel safe in your environment? Yes    Do you have a Health Care Directive? No: Advance care planning was reviewed with patient; patient declined at this time.    Patient would like to discuss possible referral for ortho.    Fall risk  Fallen 2 or more times in the past year?: No  Any fall with injury in the past year?: No  Cognitive Screening   1) Repeat 3 items (Leader, Season, Table)    2) Clock draw: NORMAL  3) 3 item recall: Recalls 3 objects  Results: NORMAL clock, 1-2 items recalled: COGNITIVE IMPAIRMENT LESS LIKELY    Mini-CogTM Copyright S Diana. Licensed by the author for use in [facility]; reprinted with permission (lou@.[facility]). All rights reserved.      Do you have sleep apnea, excessive snoring or daytime drowsiness?: yes    Reviewed and updated as needed this visit by clinical staff  Tobacco  Allergies  Med Hx  Surg Hx  Fam Hx  Soc Hx        Reviewed and updated as needed this visit by Provider        Social History     Tobacco Use     Smoking status: Never Smoker     Smokeless " tobacco: Never Used   Substance Use Topics     Alcohol use: Yes     Alcohol/week: 5.0 oz     Types: 10 Cans of beer per week     Comment: occasional        Alcohol Use 3/15/2019   If you drink alcohol do you typically have greater than 3 drinks per day OR greater than 7 drinks per week? No               Current providers sharing in care for this patient include:   Patient Care Team:  Jose Hernandez MD as PCP - General  Jose Hernandez MD as Assigned PCP    The following health maintenance items are reviewed in Epic and correct as of today:  Health Maintenance   Topic Date Due     ZOSTER IMMUNIZATION (2 of 3) 03/30/2016     MEDICARE ANNUAL WELLNESS VISIT  07/31/2016     FALL RISK ASSESSMENT  01/05/2018     ADVANCE DIRECTIVE PLANNING Q5 YRS  05/24/2018     PHQ-2 Q1 YR  05/09/2019     LIPID MONITORING Q1 YEAR  08/24/2019     COLON CANCER SCREEN (SYSTEM ASSIGNED)  10/07/2023     DTAP/TDAP/TD IMMUNIZATION (3 - Td) 12/13/2026     INFLUENZA VACCINE  Completed     AORTIC ANEURYSM SCREENING (SYSTEM ASSIGNED)  Completed     HEPATITIS C SCREENING  Completed     IPV IMMUNIZATION  Aged Out     MENINGITIS IMMUNIZATION  Aged Out           Review of Systems   Constitutional: Negative for chills and fever.   HENT: Negative for congestion, ear pain, hearing loss and sore throat.    Eyes: Negative for pain and visual disturbance.   Respiratory: Negative for cough and shortness of breath.    Cardiovascular: Negative for chest pain and peripheral edema.   Gastrointestinal: Positive for heartburn. Negative for abdominal pain, constipation, diarrhea, hematochezia and nausea.   Genitourinary: Positive for urgency. Negative for discharge, dysuria, frequency, genital sores, hematuria and impotence.   Musculoskeletal: Positive for arthralgias and joint swelling. Negative for myalgias.   Skin: Negative for rash.   Neurological: Negative for dizziness, weakness, headaches and paresthesias.   Psychiatric/Behavioral: Negative for mood  "changes. The patient is nervous/anxious.      Constitutional, HEENT, cardiovascular, pulmonary, GI, , musculoskeletal, neuro, skin, endocrine and psych systems are negative, except as otherwise noted.    OBJECTIVE:   There were no vitals taken for this visit. Estimated body mass index is 31.81 kg/m  as calculated from the following:    Height as of 11/6/18: 1.74 m (5' 8.5\").    Weight as of 11/6/18: 96.3 kg (212 lb 4.8 oz).  Physical Exam  GENERAL: healthy, alert and no distress  EYES: Eyes grossly normal to inspection, PERRL and conjunctivae and sclerae normal  HENT: ear canals and TM's normal, nose and mouth without ulcers or lesions  NECK: no adenopathy, no asymmetry, masses, or scars and thyroid normal to palpation  RESP: lungs clear to auscultation - no rales, rhonchi or wheezes  CV: regular rate and rhythm, normal S1 S2, no S3 or S4, no murmur, click or rub, no peripheral edema and peripheral pulses strong  ABDOMEN: soft, nontender, no hepatosplenomegaly, no masses and bowel sounds normal  MS: no gross musculoskeletal defects noted, no edema  SKIN: no suspicious lesions or rashes  NEURO: Normal strength and tone, mentation intact and speech normal  PSYCH: mentation appears normal, affect normal/bright    Diagnostic Test Results:  none     ASSESSMENT / PLAN:   1. Routine general medical examination at a health care facility  He has multiple problems in his medical history from cardiac to urologic sinus but is actually quite stable right now and has no complaints.    2. Arthropathy  This was renewed it is helped him.  - hydrOXYzine (VISTARIL) 25 MG capsule; Take 1 capsule (25 mg) by mouth 3 times daily as needed for anxiety  Dispense: 60 capsule; Refill: 0    3. Kidney hematoma, unspecified laterality, initial encounter      4. Anxiety  Multiple medications.    5. 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, insomnia type  Chronically takes Ambien.      End of Life Planning:  Patient currently has an " "advanced directive: Yes.  Practitioner is supportive of decision.    COUNSELING:  Reviewed preventive health counseling, as reflected in patient instructions       Regular exercise       Healthy diet/nutrition       Vision screening       Hearing screening    BP Readings from Last 1 Encounters:   11/06/18 124/62     Estimated body mass index is 31.81 kg/m  as calculated from the following:    Height as of 11/6/18: 1.74 m (5' 8.5\").    Weight as of 11/6/18: 96.3 kg (212 lb 4.8 oz).      Weight management plan: Discussed healthy diet and exercise guidelines     reports that  has never smoked. he has never used smokeless tobacco.      Appropriate preventive services were discussed with this patient, including applicable screening as appropriate for cardiovascular disease, diabetes, osteopenia/osteoporosis, and glaucoma.  As appropriate for age/gender, discussed screening for colorectal cancer, prostate cancer, breast cancer, and cervical cancer. Checklist reviewing preventive services available has been given to the patient.    Reviewed patients plan of care and provided an AVS. The Basic Care Plan (routine screening as documented in Health Maintenance) for Quan meets the Care Plan requirement. This Care Plan has been established and reviewed with the Patient.    Counseling Resources:  ATP IV Guidelines  Pooled Cohorts Equation Calculator  Breast Cancer Risk Calculator  FRAX Risk Assessment  ICSI Preventive Guidelines  Dietary Guidelines for Americans, 2010  USDA's MyPlate  ASA Prophylaxis  Lung CA Screening    Jose Hernandez MD  [facility]  "

## 2019-04-02 ENCOUNTER — MYC REFILL (OUTPATIENT)
Dept: FAMILY MEDICINE | Facility: CLINIC | Age: 69
End: 2019-04-02

## 2019-04-02 DIAGNOSIS — S37.019A KIDNEY HEMATOMA, UNSPECIFIED LATERALITY, INITIAL ENCOUNTER: ICD-10-CM

## 2019-04-02 DIAGNOSIS — M12.9 ARTHROPATHY: ICD-10-CM

## 2019-04-02 DIAGNOSIS — G47.01 SLEEP DISORDER DUE TO A GENERAL MEDICAL CONDITION, INSOMNIA TYPE: ICD-10-CM

## 2019-04-02 NOTE — TELEPHONE ENCOUNTER
Noceo 7.5-325 MG       Last Written Prescription Date:  3/1/19  Last Fill Quantity: 120,   # refills: 0  Last Office Visit: 3/15/19  Future Office visit:       Routing refill request to provider for review/approval because:  Drug not on the [facility], [facility] or [facility] refill protocol or controlled substance  Zolpidem       Last Written Prescription Date:  10/18/18  Last Fill Quantity: 30,   # refills: 5  Last Office Visit: 3/15/19  Future Office visit:       Routing refill request to provider for review/approval because:  Drug not on the [facility], [facility] or [facility] refill protocol or controlled substance

## 2019-04-03 RX ORDER — HYDROXYZINE PAMOATE 25 MG/1
25 CAPSULE ORAL 3 TIMES DAILY PRN
Qty: 60 CAPSULE | Refills: 0 | Status: SHIPPED | OUTPATIENT
Start: 2019-04-03 | End: 2019-04-30

## 2019-04-03 RX ORDER — ZOLPIDEM TARTRATE 10 MG/1
10 TABLET ORAL
Qty: 30 TABLET | Refills: 5 | Status: SHIPPED | OUTPATIENT
Start: 2019-04-03 | End: 2019-09-18

## 2019-04-03 RX ORDER — HYDROCODONE BITARTRATE AND ACETAMINOPHEN 7.5; 325 MG/1; MG/1
1 TABLET ORAL EVERY 6 HOURS PRN
Qty: 120 TABLET | Refills: 0 | Status: SHIPPED | OUTPATIENT
Start: 2019-04-03 | End: 2019-04-30

## 2019-04-11 ENCOUNTER — MYC REFILL (OUTPATIENT)
Dept: FAMILY MEDICINE | Facility: CLINIC | Age: 69
End: 2019-04-11

## 2019-04-11 DIAGNOSIS — F41.9 ANXIETY: ICD-10-CM

## 2019-04-15 RX ORDER — CITALOPRAM HYDROBROMIDE 40 MG/1
40 TABLET ORAL DAILY
Qty: 30 TABLET | Refills: 10 | Status: SHIPPED | OUTPATIENT
Start: 2019-04-15 | End: 2020-03-06

## 2019-04-15 NOTE — TELEPHONE ENCOUNTER
"Prescription approved per RN refill protocol.  Chela Garsia RN, BSN      Celexa  Last Written Prescription Date:  9/21/2018  Last Fill Quantity: 30,  # refills: 6   Last office visit: 3/15/2019 with prescribing provider: edy   Future Office Visit:      Requested Prescriptions   Pending Prescriptions Disp Refills     citalopram (CELEXA) 40 MG tablet 30 tablet 6     Sig: Take 1 tablet (40 mg) by mouth daily       SSRIs Protocol Passed - 4/11/2019 10:56 AM        Passed - Recent (12 mo) or future (30 days) visit within the authorizing provider's specialty     Patient had office visit in the last 12 months or has a visit in the next 30 days with authorizing provider or within the authorizing provider's specialty.  See \"Patient Info\" tab in inbasket, or \"Choose Columns\" in Meds & Orders section of the refill encounter.              Passed - Medication is active on med list        Passed - Patient is age 18 or older          Chela Garsia RN on 4/15/2019 at 1:23 PM    "

## 2019-04-30 ENCOUNTER — MYC REFILL (OUTPATIENT)
Dept: FAMILY MEDICINE | Facility: CLINIC | Age: 69
End: 2019-04-30

## 2019-04-30 DIAGNOSIS — S37.019A KIDNEY HEMATOMA, UNSPECIFIED LATERALITY, INITIAL ENCOUNTER: ICD-10-CM

## 2019-04-30 DIAGNOSIS — F41.9 ANXIETY: ICD-10-CM

## 2019-04-30 DIAGNOSIS — M12.9 ARTHROPATHY: ICD-10-CM

## 2019-04-30 RX ORDER — HYDROXYZINE PAMOATE 25 MG/1
25 CAPSULE ORAL 3 TIMES DAILY PRN
Qty: 60 CAPSULE | Refills: 0 | Status: SHIPPED | OUTPATIENT
Start: 2019-04-30 | End: 2019-05-18

## 2019-04-30 NOTE — TELEPHONE ENCOUNTER
Controlled Substance Refill Request for Hydrocodone  Problem List Complete:  No     PROVIDER TO CONSIDER COMPLETION OF PROBLEM LIST AND OVERVIEW/CONTROLLED SUBSTANCE AGREEMENT    Last Written Prescription Date:  4/3/2019  Last Fill Quantity: 120,   # refills: 0    THE MOST RECENT OFFICE VISIT MUST BE WITHIN THE PAST 3 MONTHS. AT LEAST ONE FACE TO FACE VISIT MUST OCCUR EVERY 6 MONTHS. ADDITIONAL VISITS CAN BE VIRTUAL.  (THIS STATEMENT SHOULD BE DELETED.)    [place] [facility] [place]: 3/15/2019    Future Office visit:     Controlled substance agreement:   Encounter-Level CSA - 02/01/2017:    Controlled Substance Agreement - Scan on 2/6/2017 11:13 AM: CONTROLLED SUBSTANCE AGREEMENT (below)       Patient-Level CSA:    There are no patient-level csa.         Last Urine Drug Screen: No results found for: CDAUT, No results found for: COMDAT, No results found for: THC13, PCP13, COC13, MAMP13, OPI13, AMP13, BZO13, TCA13, MTD13, BAR13, OXY13, PPX13, BUP13     [email or web address]       checked in past 3 months?  Yes 4/30/2019      Controlled Substance Refill Request for Alprazolam  Problem List Complete:  No     PROVIDER TO CONSIDER COMPLETION OF PROBLEM LIST AND OVERVIEW/CONTROLLED SUBSTANCE AGREEMENT    Last Written Prescription Date:  3/1/2019  Last Fill Quantity: 90,   # refills: 1

## 2019-05-01 RX ORDER — HYDROCODONE BITARTRATE AND ACETAMINOPHEN 7.5; 325 MG/1; MG/1
1 TABLET ORAL EVERY 6 HOURS PRN
Qty: 120 TABLET | Refills: 0 | Status: SHIPPED | OUTPATIENT
Start: 2019-05-01 | End: 2019-05-27

## 2019-05-01 RX ORDER — ALPRAZOLAM 0.5 MG
0.5 TABLET ORAL 3 TIMES DAILY PRN
Qty: 90 TABLET | Refills: 1 | Status: SHIPPED | OUTPATIENT
Start: 2019-05-01 | End: 2019-06-26

## 2019-05-03 ENCOUNTER — TELEPHONE (OUTPATIENT)
Dept: FAMILY MEDICINE | Facility: CLINIC | Age: 69
End: 2019-05-03

## 2019-05-18 ENCOUNTER — MYC REFILL (OUTPATIENT)
Dept: FAMILY MEDICINE | Facility: CLINIC | Age: 69
End: 2019-05-18

## 2019-05-18 DIAGNOSIS — M12.9 ARTHROPATHY: ICD-10-CM

## 2019-05-21 ENCOUNTER — MYC REFILL (OUTPATIENT)
Dept: FAMILY MEDICINE | Facility: CLINIC | Age: 69
End: 2019-05-21

## 2019-05-21 DIAGNOSIS — M12.9 ARTHROPATHY: ICD-10-CM

## 2019-05-21 RX ORDER — HYDROXYZINE PAMOATE 25 MG/1
25 CAPSULE ORAL 3 TIMES DAILY PRN
Qty: 60 CAPSULE | Refills: 3 | Status: SHIPPED | OUTPATIENT
Start: 2019-05-21 | End: 2019-09-23

## 2019-05-22 RX ORDER — HYDROXYZINE PAMOATE 25 MG/1
25 CAPSULE ORAL 3 TIMES DAILY PRN
Qty: 60 CAPSULE | Refills: 0 | OUTPATIENT
Start: 2019-05-22

## 2019-05-22 NOTE — TELEPHONE ENCOUNTER
"Hydroxyzine  Last Written Prescription Date:  5/21/2019  Last Fill Quantity: 60,  # refills: 3   Last office visit: 3/15/2019 with prescribing provider:  David   Future Office Visit:      Requested Prescriptions   Pending Prescriptions Disp Refills     hydrOXYzine (VISTARIL) 25 MG capsule 60 capsule 0     Sig: Take 1 capsule (25 mg) by mouth 3 times daily as needed for anxiety       Antihistamines Protocol Passed - 5/21/2019  1:38 PM        Passed - Recent (12 mo) or future (30 days) visit within the authorizing provider's specialty     Patient had office visit in the last 12 months or has a visit in the next 30 days with authorizing provider or within the authorizing provider's specialty.  See \"Patient Info\" tab in inbasket, or \"Choose Columns\" in Meds & Orders section of the refill encounter.              Passed - Patient is age 3 or older     Apply age and/or weight-based dosing for peds patients age 3 and older.    Forward request to provider for patients under the age of 3.          Passed - Medication is active on med list        Rx was sent 5/21/2019 for 60 tabs and 3 refills. Pharmacy notified via E-prescribe refusal    Chela Garsia RN on 5/22/2019 at 1:59 PM    "

## 2019-05-27 ENCOUNTER — MYC REFILL (OUTPATIENT)
Dept: FAMILY MEDICINE | Facility: CLINIC | Age: 69
End: 2019-05-27

## 2019-05-27 DIAGNOSIS — S37.019A KIDNEY HEMATOMA, UNSPECIFIED LATERALITY, INITIAL ENCOUNTER: ICD-10-CM

## 2019-05-28 NOTE — TELEPHONE ENCOUNTER
NORCO      Last Written Prescription Date:  5/01/19  Last Fill Quantity: 120,   # refills: 0  Last Office Visit: 03/15/19  Future Office visit:       Routing refill request to provider for review/approval because:  Drug not on the [facility], [facility] or [facility] refill protocol or controlled substance  RUTH Rolle

## 2019-05-29 ENCOUNTER — MYC REFILL (OUTPATIENT)
Dept: FAMILY MEDICINE | Facility: CLINIC | Age: 69
End: 2019-05-29

## 2019-05-29 DIAGNOSIS — S37.019A KIDNEY HEMATOMA, UNSPECIFIED LATERALITY, INITIAL ENCOUNTER: ICD-10-CM

## 2019-05-30 RX ORDER — HYDROCODONE BITARTRATE AND ACETAMINOPHEN 7.5; 325 MG/1; MG/1
1 TABLET ORAL EVERY 6 HOURS PRN
Qty: 120 TABLET | Refills: 0 | Status: SHIPPED | OUTPATIENT
Start: 2019-05-30 | End: 2019-06-26

## 2019-05-31 RX ORDER — HYDROCODONE BITARTRATE AND ACETAMINOPHEN 7.5; 325 MG/1; MG/1
1 TABLET ORAL EVERY 6 HOURS PRN
Qty: 120 TABLET | Refills: 0 | OUTPATIENT
Start: 2019-05-31

## 2019-05-31 NOTE — TELEPHONE ENCOUNTER
Requested Prescriptions   Pending Prescriptions Disp Refills     HYDROcodone-acetaminophen (NORCO) 7.5-325 MG per tablet 120 tablet 0     Sig: Take 1 tablet by mouth every 6 hours as needed for moderate to severe pain maximum 4 tablet(s) per day       There is no refill protocol information for this order        Last Written Prescription Date:  5/30/2019  Last Fill Quantity: 120,  # refills:    Last office visit: 3/15/2019 with prescribing provider:     Future Office Visit:      Denied  Duplicate  Renay Jackson RN      '

## 2019-06-03 DIAGNOSIS — C61 MALIGNANT NEOPLASM OF PROSTATE (H): Primary | ICD-10-CM

## 2019-06-03 PROCEDURE — 84403 ASSAY OF TOTAL TESTOSTERONE: CPT | Performed by: UROLOGY

## 2019-06-03 PROCEDURE — 36415 COLL VENOUS BLD VENIPUNCTURE: CPT | Performed by: UROLOGY

## 2019-06-03 PROCEDURE — 84153 ASSAY OF PSA TOTAL: CPT | Performed by: UROLOGY

## 2019-06-04 ENCOUNTER — MYC REFILL (OUTPATIENT)
Dept: FAMILY MEDICINE | Facility: CLINIC | Age: 69
End: 2019-06-04

## 2019-06-04 DIAGNOSIS — K21.00 GASTROESOPHAGEAL REFLUX DISEASE WITH ESOPHAGITIS: ICD-10-CM

## 2019-06-04 LAB — PSA SERPL-MCNC: 0.1 UG/L (ref 0–4)

## 2019-06-06 ENCOUNTER — HOSPITAL ENCOUNTER (OUTPATIENT)
Dept: ULTRASOUND IMAGING | Facility: CLINIC | Age: 69
Discharge: HOME | End: 2019-06-06
Attending: UROLOGY | Admitting: UROLOGY
Payer: MEDICARE

## 2019-06-06 ENCOUNTER — TRANSFERRED RECORDS (OUTPATIENT)
Dept: HEALTH INFORMATION MANAGEMENT | Facility: CLINIC | Age: 69
End: 2019-06-06

## 2019-06-06 DIAGNOSIS — E29.1 HYPOGONADISM MALE: ICD-10-CM

## 2019-06-06 DIAGNOSIS — C61 PROSTATE CANCER (H): ICD-10-CM

## 2019-06-06 DIAGNOSIS — N20.0 KIDNEY STONES: ICD-10-CM

## 2019-06-06 DIAGNOSIS — N20.0 NEPHROLITHIASIS: Primary | ICD-10-CM

## 2019-06-06 LAB — TESTOST SERPL-MCNC: 301 NG/DL (ref 240–950)

## 2019-06-06 PROCEDURE — 76770 US EXAM ABDO BACK WALL COMP: CPT

## 2019-06-06 RX ORDER — OMEPRAZOLE 40 MG/1
40 CAPSULE, DELAYED RELEASE ORAL DAILY
Qty: 90 CAPSULE | Refills: 2 | Status: SHIPPED | OUTPATIENT
Start: 2019-06-06 | End: 2020-02-24

## 2019-06-06 NOTE — TELEPHONE ENCOUNTER
"Requested Prescriptions   Pending Prescriptions Disp Refills     omeprazole (PRILOSEC) 40 MG DR capsule 90 capsule 0     Sig: Take 1 capsule (40 mg) by mouth daily   Last Written Prescription Date:  1/28/2019  Last Fill Quantity: 90,  # refills: 0   Last office visit: 3/15/2019 with prescribing provider:     Future Office Visit:        PPI Protocol Passed - 6/4/2019 11:04 AM        Passed - Not on Clopidogrel (unless Pantoprazole ordered)        Passed - No diagnosis of osteoporosis on record        Passed - Recent (12 mo) or future (30 days) visit within the authorizing provider's specialty     Patient had office visit in the last 12 months or has a visit in the next 30 days with authorizing provider or within the authorizing provider's specialty.  See \"Patient Info\" tab in inbasket, or \"Choose Columns\" in Meds & Orders section of the refill encounter.              Passed - Medication is active on med list        Passed - Patient is age 18 or older        Prescription approved per [facility] Refill Protocol.  Renay Jackson RN    "

## 2019-06-13 ENCOUNTER — TELEPHONE (OUTPATIENT)
Dept: FAMILY MEDICINE | Facility: CLINIC | Age: 69
End: 2019-06-13

## 2019-06-13 NOTE — TELEPHONE ENCOUNTER
Please see [employer] message from pt:    ----- Message from [employer] sent at 6/12/2019  2:02 PM CDT -----      Topic: Procedural Question      Is my chart now having problems processing information in a timely manner?  My doctor is notorious for addressing my chart  requests promptly and for some time now this has not been the case.  If Dr. Hernandez is on overload, I accept that.  If it however is a my chart malfunction, I would like to know that so I can plan accordingly.  (I tried calling their help line, but got tired of being on hold.)

## 2019-06-13 NOTE — TELEPHONE ENCOUNTER
Patient is aware that we have a new refill process and that he needs to ask for his medications 3 to 4 days prior to being out.

## 2019-06-23 ENCOUNTER — MYC REFILL (OUTPATIENT)
Dept: FAMILY MEDICINE | Facility: CLINIC | Age: 69
End: 2019-06-23

## 2019-06-23 DIAGNOSIS — F41.9 ANXIETY: ICD-10-CM

## 2019-06-23 DIAGNOSIS — S37.019A KIDNEY HEMATOMA, UNSPECIFIED LATERALITY, INITIAL ENCOUNTER: ICD-10-CM

## 2019-06-23 RX ORDER — ALPRAZOLAM 0.5 MG
0.5 TABLET ORAL 3 TIMES DAILY PRN
Qty: 90 TABLET | Refills: 1 | Status: CANCELLED | OUTPATIENT
Start: 2019-06-23

## 2019-06-23 RX ORDER — HYDROCODONE BITARTRATE AND ACETAMINOPHEN 7.5; 325 MG/1; MG/1
1 TABLET ORAL EVERY 6 HOURS PRN
Qty: 120 TABLET | Refills: 0 | Status: CANCELLED | OUTPATIENT
Start: 2019-06-23

## 2019-06-26 RX ORDER — ALPRAZOLAM 0.5 MG
0.5 TABLET ORAL 3 TIMES DAILY PRN
Qty: 90 TABLET | Refills: 1 | Status: SHIPPED | OUTPATIENT
Start: 2019-06-26 | End: 2019-08-22

## 2019-06-26 RX ORDER — HYDROCODONE BITARTRATE AND ACETAMINOPHEN 7.5; 325 MG/1; MG/1
1 TABLET ORAL EVERY 6 HOURS PRN
Qty: 120 TABLET | Refills: 0 | Status: SHIPPED | OUTPATIENT
Start: 2019-06-26 | End: 2019-07-22

## 2019-06-26 NOTE — TELEPHONE ENCOUNTER
Controlled Substance Refill Request for alprazolam  Problem List Complete:  No   Last Written Prescription Date:  5/1/2019  Last Fill Quantity: 90,   # refills: 1        Controlled Substance Refill Request for Norco  Problem List Complete:  No   Last Written Prescription Date:  5/30/2019  Last Fill Quantity: 120,   # refills: 0  Last Office Visit with [facility] primary care provider: 3/15/2019    Future Office visit:   Next 5 appointments (look out 90 days)    Jun 28, 2019 11:40 AM CDT  Kash Coffman [facility] [address] [place] [id number]  [phone]          Controlled substance agreement:   Encounter-Level CSA - 02/01/2017:    Controlled Substance Agreement - Scan on 2/6/2017 11:13 AM: CONTROLLED SUBSTANCE AGREEMENT (below)       Patient-Level CSA:    There are no patient-level csa.         Last Urine Drug Screen: No results found for: CDAUT, No results found for: COMDAT, No results found for: THC13, PCP13, COC13, MAMP13, OPI13, AMP13, BZO13, TCA13, MTD13, BAR13, OXY13, PPX13, BUP13     [email or web address]       checked in past 3 months?  Yes 4/30/2019

## 2019-06-27 ENCOUNTER — ALLIED HEALTH/NURSE VISIT (OUTPATIENT)
Dept: FAMILY MEDICINE | Facility: CLINIC | Age: 69
End: 2019-06-27
Payer: COMMERCIAL

## 2019-06-27 DIAGNOSIS — J30.2 SEASONAL ALLERGIC RHINITIS, UNSPECIFIED TRIGGER: Primary | ICD-10-CM

## 2019-06-27 RX ORDER — DEXAMETHASONE SODIUM PHOSPHATE 10 MG/ML
8 INJECTION INTRAMUSCULAR; INTRAVENOUS ONCE
Status: DISCONTINUED | OUTPATIENT
Start: 2019-06-27 | End: 2019-06-27 | Stop reason: CLARIF

## 2019-06-28 ENCOUNTER — ALLIED HEALTH/NURSE VISIT (OUTPATIENT)
Dept: FAMILY MEDICINE | Facility: CLINIC | Age: 69
End: 2019-06-28
Payer: MEDICARE

## 2019-06-28 DIAGNOSIS — J30.2 SEASONAL ALLERGIC RHINITIS, UNSPECIFIED TRIGGER: Primary | ICD-10-CM

## 2019-06-28 PROCEDURE — 96372 THER/PROPH/DIAG INJ SC/IM: CPT

## 2019-06-28 RX ORDER — DEXAMETHASONE SODIUM PHOSPHATE 10 MG/ML
8 INJECTION INTRAMUSCULAR; INTRAVENOUS ONCE
Status: COMPLETED | OUTPATIENT
Start: 2019-06-28 | End: 2019-06-28

## 2019-06-28 RX ADMIN — DEXAMETHASONE SODIUM PHOSPHATE 8 MG: 10 INJECTION INTRAMUSCULAR; INTRAVENOUS at 11:13

## 2019-06-28 NOTE — PROGRESS NOTES
Prior to injection, verified patient identity using patient's name and date of birth.  Due to injection administration, patient instructed to remain in clinic for 15 minutes  afterwards, and to report any adverse reaction to me immediately.    Decadron    Drug Amount Wasted:  Yes: 2 mg/ml   Vial/Syringe: Single dose vial  Expiration Date:  11/20  Location: Left Gluteus Sang    Nita Livingston [facility]

## 2019-07-22 ENCOUNTER — MYC REFILL (OUTPATIENT)
Dept: FAMILY MEDICINE | Facility: CLINIC | Age: 69
End: 2019-07-22

## 2019-07-22 DIAGNOSIS — J32.0 CHRONIC MAXILLARY SINUSITIS: Primary | ICD-10-CM

## 2019-07-22 DIAGNOSIS — M12.9 ARTHROPATHY: ICD-10-CM

## 2019-07-22 DIAGNOSIS — S37.019A KIDNEY HEMATOMA, UNSPECIFIED LATERALITY, INITIAL ENCOUNTER: ICD-10-CM

## 2019-07-22 DIAGNOSIS — M19.079: ICD-10-CM

## 2019-07-23 RX ORDER — HYDROCODONE BITARTRATE AND ACETAMINOPHEN 7.5; 325 MG/1; MG/1
1 TABLET ORAL EVERY 6 HOURS PRN
Qty: 28 TABLET | Refills: 0 | Status: SHIPPED | OUTPATIENT
Start: 2019-07-23 | End: 2019-07-30

## 2019-07-23 NOTE — TELEPHONE ENCOUNTER
I will give him #28 to last him a week, then has to talk with Dr Hernandez.  He should not need narcotic pain medications for sinus disease.  He should start weaning this down by try 2-3 a day.

## 2019-07-23 NOTE — TELEPHONE ENCOUNTER
Requested Prescriptions   Pending Prescriptions Disp Refills     HYDROcodone-acetaminophen (NORCO) 7.5-325 MG per tablet 120 tablet 0     Sig: Take 1 tablet by mouth every 6 hours as needed for moderate to severe pain maximum 4 tablet(s) per day       There is no refill protocol information for this order        Last Written Prescription Date:  6/26/2019  Last Fill Quantity: 120,  # refills: 0   Last office visit: 3/15/2019 with prescribing provider:     Future Office Visit:      Kidney hematoma, unspecified laterality, initial encounter [S37.019A]   Routing refill request to provider for review/approval because:  Drug not on the [facility] refill protocol       Renay Jackson RN

## 2019-07-23 NOTE — TELEPHONE ENCOUNTER
Patient calls asking for Nita to call him, in regards to the medication refill.  Is informed we are waiting for Dr Ponce's determination on this.  He jsut asks again f

## 2019-07-23 NOTE — TELEPHONE ENCOUNTER
This doesn't have a chronic diagnosis for pain management. If for kidney hematoma that should be resolved by now.    Please check on reason for the pain.

## 2019-07-23 NOTE — TELEPHONE ENCOUNTER
Patient states that it is used for chronic sinusitis and joint pain.     Nita Livingston, [facility]

## 2019-08-01 ENCOUNTER — MYC MEDICAL ADVICE (OUTPATIENT)
Dept: FAMILY MEDICINE | Facility: CLINIC | Age: 69
End: 2019-08-01

## 2019-08-05 ENCOUNTER — APPOINTMENT (OUTPATIENT)
Dept: CT IMAGING | Facility: CLINIC | Age: 69
End: 2019-08-05
Attending: FAMILY MEDICINE
Payer: MEDICARE

## 2019-08-05 ENCOUNTER — HOSPITAL ENCOUNTER (EMERGENCY)
Facility: CLINIC | Age: 69
Discharge: HOME | End: 2019-08-05
Attending: FAMILY MEDICINE | Admitting: FAMILY MEDICINE
Payer: MEDICARE

## 2019-08-05 VITALS
OXYGEN SATURATION: 96 % | DIASTOLIC BLOOD PRESSURE: 77 MMHG | RESPIRATION RATE: 18 BRPM | SYSTOLIC BLOOD PRESSURE: 123 MMHG | TEMPERATURE: 96.6 F

## 2019-08-05 DIAGNOSIS — A09 INFECTIOUS DIARRHEA IN ADULT PATIENT: ICD-10-CM

## 2019-08-05 DIAGNOSIS — R10.84 ABDOMINAL PAIN, GENERALIZED: ICD-10-CM

## 2019-08-05 LAB
ALBUMIN SERPL-MCNC: 3.3 G/DL (ref 3.4–5)
ALP SERPL-CCNC: 77 U/L (ref 40–150)
ALT SERPL W P-5'-P-CCNC: 46 U/L (ref 0–70)
ANION GAP SERPL CALCULATED.3IONS-SCNC: 6 MMOL/L (ref 3–14)
AST SERPL W P-5'-P-CCNC: 33 U/L (ref 0–45)
BASOPHILS # BLD AUTO: 0 10E9/L (ref 0–0.2)
BASOPHILS NFR BLD AUTO: 0.3 %
BILIRUB SERPL-MCNC: 0.6 MG/DL (ref 0.2–1.3)
BUN SERPL-MCNC: 21 MG/DL (ref 7–30)
CALCIUM SERPL-MCNC: 8.4 MG/DL (ref 8.5–10.1)
CHLORIDE SERPL-SCNC: 106 MMOL/L (ref 94–109)
CO2 SERPL-SCNC: 26 MMOL/L (ref 20–32)
CREAT SERPL-MCNC: 1.22 MG/DL (ref 0.66–1.25)
CRP SERPL-MCNC: <2.9 MG/L (ref 0–8)
DIFFERENTIAL METHOD BLD: ABNORMAL
EOSINOPHIL NFR BLD AUTO: 1.7 %
ERYTHROCYTE [DISTWIDTH] IN BLOOD BY AUTOMATED COUNT: 13.4 % (ref 10–15)
ERYTHROCYTE [SEDIMENTATION RATE] IN BLOOD BY WESTERGREN METHOD: 6 MM/H (ref 0–20)
GFR SERPL CREATININE-BSD FRML MDRD: 61 ML/MIN/{1.73_M2}
GLUCOSE SERPL-MCNC: 103 MG/DL (ref 70–99)
HCT VFR BLD AUTO: 37.2 % (ref 40–53)
HGB BLD-MCNC: 12.4 G/DL (ref 13.3–17.7)
IMM GRANULOCYTES # BLD: 0 10E9/L (ref 0–0.4)
IMM GRANULOCYTES NFR BLD: 0.2 %
LIPASE SERPL-CCNC: 128 U/L (ref 73–393)
LYMPHOCYTES # BLD AUTO: 1.4 10E9/L (ref 0.8–5.3)
LYMPHOCYTES NFR BLD AUTO: 23.6 %
MCH RBC QN AUTO: 28.8 PG (ref 26.5–33)
MCHC RBC AUTO-ENTMCNC: 33.3 G/DL (ref 31.5–36.5)
MCV RBC AUTO: 86 FL (ref 78–100)
MONOCYTES # BLD AUTO: 0.5 10E9/L (ref 0–1.3)
MONOCYTES NFR BLD AUTO: 8.6 %
NEUTROPHILS # BLD AUTO: 3.8 10E9/L (ref 1.6–8.3)
NEUTROPHILS NFR BLD AUTO: 65.6 %
NRBC # BLD AUTO: 0 10*3/UL
NRBC BLD AUTO-RTO: 0 /100
PLATELET # BLD AUTO: 147 10E9/L (ref 150–450)
POTASSIUM SERPL-SCNC: 3.7 MMOL/L (ref 3.4–5.3)
PROT SERPL-MCNC: 6.1 G/DL (ref 6.8–8.8)
RBC # BLD AUTO: 4.31 10E12/L (ref 4.4–5.9)
SODIUM SERPL-SCNC: 138 MMOL/L (ref 133–144)
WBC # BLD AUTO: 5.7 10E9/L (ref 4–11)

## 2019-08-05 PROCEDURE — 85652 RBC SED RATE AUTOMATED: CPT | Performed by: FAMILY MEDICINE

## 2019-08-05 PROCEDURE — 96374 THER/PROPH/DIAG INJ IV PUSH: CPT | Mod: 59 | Performed by: FAMILY MEDICINE

## 2019-08-05 PROCEDURE — 99285 EMERGENCY DEPT VISIT HI MDM: CPT | Mod: 25 | Performed by: FAMILY MEDICINE

## 2019-08-05 PROCEDURE — 80053 COMPREHEN METABOLIC PANEL: CPT | Performed by: FAMILY MEDICINE

## 2019-08-05 PROCEDURE — 96375 TX/PRO/DX INJ NEW DRUG ADDON: CPT | Performed by: FAMILY MEDICINE

## 2019-08-05 PROCEDURE — 25000125 ZZHC RX 250: Performed by: FAMILY MEDICINE

## 2019-08-05 PROCEDURE — 86140 C-REACTIVE PROTEIN: CPT | Performed by: FAMILY MEDICINE

## 2019-08-05 PROCEDURE — 83690 ASSAY OF LIPASE: CPT | Performed by: FAMILY MEDICINE

## 2019-08-05 PROCEDURE — 25000128 H RX IP 250 OP 636: Performed by: FAMILY MEDICINE

## 2019-08-05 PROCEDURE — 85025 COMPLETE CBC W/AUTO DIFF WBC: CPT | Performed by: FAMILY MEDICINE

## 2019-08-05 PROCEDURE — 99285 EMERGENCY DEPT VISIT HI MDM: CPT | Mod: Z6 | Performed by: FAMILY MEDICINE

## 2019-08-05 PROCEDURE — 96361 HYDRATE IV INFUSION ADD-ON: CPT | Performed by: FAMILY MEDICINE

## 2019-08-05 PROCEDURE — 74177 CT ABD & PELVIS W/CONTRAST: CPT

## 2019-08-05 RX ORDER — ONDANSETRON 2 MG/ML
4 INJECTION INTRAMUSCULAR; INTRAVENOUS EVERY 30 MIN PRN
Status: DISCONTINUED | OUTPATIENT
Start: 2019-08-05 | End: 2019-08-05 | Stop reason: HOSPADM

## 2019-08-05 RX ORDER — LORAZEPAM 2 MG/ML
0.5 INJECTION INTRAMUSCULAR ONCE
Status: COMPLETED | OUTPATIENT
Start: 2019-08-05 | End: 2019-08-05

## 2019-08-05 RX ORDER — SODIUM CHLORIDE 9 MG/ML
1000 INJECTION, SOLUTION INTRAVENOUS CONTINUOUS
Status: DISCONTINUED | OUTPATIENT
Start: 2019-08-05 | End: 2019-08-05 | Stop reason: HOSPADM

## 2019-08-05 RX ORDER — IOPAMIDOL 755 MG/ML
500 INJECTION, SOLUTION INTRAVASCULAR ONCE
Status: COMPLETED | OUTPATIENT
Start: 2019-08-05 | End: 2019-08-05

## 2019-08-05 RX ADMIN — IOPAMIDOL 100 ML: 755 INJECTION, SOLUTION INTRAVENOUS at 07:34

## 2019-08-05 RX ADMIN — SODIUM CHLORIDE 60 ML: 9 INJECTION, SOLUTION INTRAVENOUS at 07:33

## 2019-08-05 RX ADMIN — SODIUM CHLORIDE 1000 ML: 9 INJECTION, SOLUTION INTRAVENOUS at 06:57

## 2019-08-05 RX ADMIN — LORAZEPAM 0.5 MG: 2 INJECTION INTRAMUSCULAR; INTRAVENOUS at 06:59

## 2019-08-05 RX ADMIN — ONDANSETRON 4 MG: 2 INJECTION INTRAMUSCULAR; INTRAVENOUS at 06:58

## 2019-08-05 NOTE — DISCHARGE INSTRUCTIONS
Please collect stool and return for testing.  Wash hands thoroughly.  Maintain good hydration.    Red flags that would require return for reassessment include:  Fever greater than 100.4 Fahrenheit  Repetitive vomiting  Bloody stools  Abdominal distention with worsening of abdominal discomfort    Most of these infections are self-limiting and do not require antibiotic therapy.  If you are having a less than 6 loose stools per day we would prefer that she not use Imodium or other medications that help reduce frequency of diarrhea.  Limited diarrhea is beneficial helping flush your system of the infection.

## 2019-08-05 NOTE — ED PROVIDER NOTES
Emergency Department Patient Sign-out       Brief HPI:  This is a 69 year old male signed out to me by Dr. Dangelo .  See initial ED Provider note for details of the presentation.         Patient was signed out pending CT the abdomen and pelvis with both IV and oral contrast.  Indication was abdominal cramping, abdominal pain, chills and diarrhea.     Laboratory work was normal for CBC, CRP  Patient has been unable to give a stool sample in the emergency department    CT shows no evidence for colitis, diverticulitis or other inflammatory/ischemic processes.          Exam:   Patient Vitals for the past 24 hrs:   BP Temp Temp src Heart Rate Resp SpO2   08/05/19 0745 123/77 -- -- -- 18 96 %   08/05/19 0617 (!) 132/93 96.6  F (35.9  C) Oral 78 20 92 %           ED RESULTS:   Results for orders placed or performed during the hospital encounter of 08/05/19 (from the past 24 hour(s))   CBC with platelets differential     Status: Abnormal    Collection Time: 08/05/19  6:49 AM   Result Value Ref Range    WBC 5.7 4.0 - 11.0 10e9/L    RBC Count 4.31 (L) 4.4 - 5.9 10e12/L    Hemoglobin 12.4 (L) 13.3 - 17.7 g/dL    Hematocrit 37.2 (L) 40.0 - 53.0 %    MCV 86 78 - 100 fl    MCH 28.8 26.5 - 33.0 pg    MCHC 33.3 31.5 - 36.5 g/dL    RDW 13.4 10.0 - 15.0 %    Platelet Count 147 (L) 150 - 450 10e9/L    Diff Method Automated Method     % Neutrophils 65.6 %    % Lymphocytes 23.6 %    % Monocytes 8.6 %    % Eosinophils 1.7 %    % Basophils 0.3 %    % Immature Granulocytes 0.2 %    Nucleated RBCs 0 0 /100    Absolute Neutrophil 3.8 1.6 - 8.3 10e9/L    Absolute Lymphocytes 1.4 0.8 - 5.3 10e9/L    Absolute Monocytes 0.5 0.0 - 1.3 10e9/L    Absolute Basophils 0.0 0.0 - 0.2 10e9/L    Abs Immature Granulocytes 0.0 0 - 0.4 10e9/L    Absolute Nucleated RBC 0.0    Comprehensive metabolic panel     Status: Abnormal    Collection Time: 08/05/19  6:49 AM   Result Value Ref Range    Sodium 138 133 - 144 mmol/L    Potassium 3.7 3.4 - 5.3 mmol/L     Chloride 106 94 - 109 mmol/L    Carbon Dioxide 26 20 - 32 mmol/L    Anion Gap 6 3 - 14 mmol/L    Glucose 103 (H) 70 - 99 mg/dL    Urea Nitrogen 21 7 - 30 mg/dL    Creatinine 1.22 0.66 - 1.25 mg/dL    GFR Estimate 61 >60 mL/min/[1.73_m2]    GFR Estimate If Black 70 >60 mL/min/[1.73_m2]    Calcium 8.4 (L) 8.5 - 10.1 mg/dL    Bilirubin Total 0.6 0.2 - 1.3 mg/dL    Albumin 3.3 (L) 3.4 - 5.0 g/dL    Protein Total 6.1 (L) 6.8 - 8.8 g/dL    Alkaline Phosphatase 77 40 - 150 U/L    ALT 46 0 - 70 U/L    AST 33 0 - 45 U/L   Lipase     Status: None    Collection Time: 08/05/19  6:49 AM   Result Value Ref Range    Lipase 128 73 - 393 U/L   CRP inflammation     Status: None    Collection Time: 08/05/19  6:49 AM   Result Value Ref Range    CRP Inflammation <2.9 0.0 - 8.0 mg/L   Erythrocyte sedimentation rate auto     Status: None    Collection Time: 08/05/19  6:49 AM   Result Value Ref Range    Sed Rate 6 0 - 20 mm/h   CT Abdomen Pelvis w Contrast     Status: None (Preliminary result)    Collection Time: 08/05/19  7:46 AM    Narrative    CT ABDOMEN AND PELVIS WITH CONTRAST 8/5/2019 8:31 AM    HISTORY: Abdominal pain and diarrhea. Evaluate for colitis. Known  history of kidney stones.    TECHNIQUE: Helical axial scans from dome of liver through pubic  symphysis with 100 mL Isovue 370  IV contrast. Radiation dose for this  scan was reduced using automated exposure control, adjustment of the  mA and/or kV according to patient size, or iterative reconstruction  technique.    COMPARISON: 10/28/2017.    FINDINGS: The liver, spleen, pancreas and adrenal glands bilaterally  are unremarkable. Interval resolution of previously seen bilateral  abnormal kidney enlargement and perinephric hemorrhage. The prior exam  also showed multiple tiny nonobstructing renal collecting system  calculi bilaterally. Evaluation for calculi is compromised due to  intravenous contrast on the current study, but there is probably only  a single nonobstructing  calculus in the midpole of the left kidney  measuring 2 mm. No urinary tract obstruction bilaterally. Vascular  calcifications are seen. The bowel and mesentery in the upper abdomen  are unremarkable.    Scans through the pelvis show diverticulosis in the distal descending  and sigmoid colon with no evidence for diverticulitis. No free fluid.  The appendix is not definitely identified, but there is no CT evidence  for appendicitis. There are two bladder calculi measuring 0.8 and 1.0  cm. The prior exam showed only a single bladder calculus measuring 0.7  cm. There has also been interval development of mild thickening of the  urinary bladder wall which may be related to cystitis, and clinical  correlation is recommended. Small left inguinal hernia containing only  fat without change. Right iliac vascular stent again noted. Curvature  of the lumbar spine convex to the left and multilevel advanced  degenerative disc disease in the lumbar region.      Impression    IMPRESSION:  1. Interval resolution of bilateral abnormal renal enlargement  associated with perinephric hemorrhage. The kidneys currently are  unremarkable with the exception of a probable tiny nonobstructing  calculus in the midpole collecting system of the left kidney.  2. Vascular calcifications in right iliac artery stent again noted.  3. Distal descending and sigmoid colon diverticulosis.  4. 0.8 and 1.0 cm bladder calculi.  5. Mild thickening of the wall of the urinary bladder may indicate  cystitis, and clinical correlation is recommended.  6. Small left inguinal hernia.       ED MEDICATIONS:   Medications   0.9% sodium chloride BOLUS (1,000 mLs Intravenous New Bag 8/5/19 0657)     Followed by   sodium chloride 0.9% infusion (has no administration in time range)   ondansetron (ZOFRAN) injection 4 mg (4 mg Intravenous Given 8/5/19 4858)   LORazepam (ATIVAN) injection 0.5 mg (0.5 mg Intravenous Given 8/5/19 0659)   sodium chloride (PF) 0.9% PF flush 3  mL (3 mLs Intravenous Given 8/5/19 0732)   Saline Bag 100mL  CT  flush use only (60 mLs Intravenous Given 8/5/19 0733)   iopamidol (ISOVUE-370) solution 500 mL (100 mLs Intravenous Given 8/5/19 0734)         Impression:    ICD-10-CM    1. Abdominal pain, generalized R10.84    2. Infectious diarrhea in adult patient A09 Clostridium difficile toxin B PCR     Enteric Bacteria and Virus Panel by NARAYAN Stool       Plan:    Clinically the patient is feeling much improved.  He had no stool output since ED arrival.  We suspect that this is been triggered by improperly cooked hamburger.  He is not showing any dysentery.  Plan to discharge home.  Collect stool sample.    Refer to discharge instructions.      Dhiraj Hernandez Daniel Eugene, DO  08/05/19 0980

## 2019-08-05 NOTE — ED AVS SNAPSHOT
[facility] Emergency Department  [address] [place] [address]  Phone:  [phone]  Fax:  [phone]                                    Quan Murphy   MRN: [id number]    Department:  [facility] Emergency Department   Date of Visit:  8/5/2019           After Visit Summary Signature Page    I have received my discharge instructions, and my questions have been answered. I have discussed any challenges I see with this plan with the nurse or doctor.    ..........................................................................................................................................  Patient/Patient Representative Signature      ..........................................................................................................................................  Patient Representative Print Name and Relationship to Patient    ..................................................               ................................................  Date                                   Time    ..........................................................................................................................................  Reviewed by Signature/Title    ...................................................              ..............................................  Date                                               Time          22EPIC Rev 08/18

## 2019-08-05 NOTE — ED PROVIDER NOTES
History     Chief Complaint   Patient presents with     Abdominal Pain     HPI  Quan Murphy is a 69 year old male who presents to the ED this morning with a 3-day history of abdominal pain cramping and diarrhea.  [place]  He likes them a little on the raw side.  It may have been undercooked.  That night he developed abdominal cramping and diarrhea.  Was sitting on the toilet, shivering and did not know if he was going to vomit.  [place] nauseous and his mouth was watering but never did vomit.  Has had diarrhea all weekend but no blood.  Every time he tries to eat or drink he has more cramping and diarrhea.  Oral fluid intake has thus been down.  Mouth feels dry.  Has not had much sleep tonight because he is been on the toilet so much.    Has not had any recent antibiotics.  Maybe 3 months ago when he had a sinus infection.  Has had some pain and cramping off and on over the last few months but nothing like this.  Has used Prilosec in the past which helped but this seems different.     Here with his wife, Alessandra.  Likes to have a couple of beers in the evening.    Allergies:  Allergies   Allergen Reactions     Animal Dander      Dust Mites      Pollen Extract      Smoke.        Problem List:    Patient Active Problem List    Diagnosis Date Noted     Renal hematoma 10/28/2017     Priority: Medium     Retroperitoneal hematoma 10/28/2017     Priority: Medium     Syncope 10/18/2017     Priority: Medium     S/P ORIF (open reduction internal fixation) fracture 08/03/2017     Priority: Medium     Closed Colles' fracture of right radius with routine healing, subsequent encounter 08/03/2017     Priority: Medium     Status post insertion of drug-eluting stent into left anterior descending artery for coronary artery disease 01/05/2017     Priority: Medium     Chest pain 12/24/2016     Priority: Medium     Arthropathy 02/04/2014     Priority: Medium     Problem list name updated by  automated process. Provider to review       Coronary atherosclerosis      Priority: Medium     Coronary artery disease  Problem list name updated by automated process. Provider to review       Hallux rigidus 07/16/2013     Priority: Medium     Inguinal hernia 06/28/2013     Priority: Medium     Degenerative arthritis of foot 06/28/2013     Priority: Medium     Advanced directives, counseling/discussion 05/24/2013     Priority: Medium     Advance Care Planning:   Receipt of ACP document:  Received: Health Care Directive which was witnessed or notarized on 8-18-08.  Document previously scanned on 7-8-13.  Validation form completed and sent to be scanned.  Code Status needs to be updated to reflect choices in most recent ACP document. Confirmed/documented designated decision maker(s). See permanent comments section of demographics in clinical tab. View document(s) and details by clicking on code status.   Added by Vandana Platt RN, System ACP Coordinator on 7/22/2013.    Advance Care Planning:   ACP Review and Resources Provided:  Reviewed chart for advance care plan.  Quan Murphy has no plan or code status on file however states presence of ACP document. Copy requested. Confirmed code status reflects current choices pending receipt of document/advance care plan review. Confirmed/documented designated decision maker(s). See permanent comments section of demographics in clinical tab.   Added by Krysten Jasmine on 5/24/2013             Hypertension goal BP (blood pressure) < 140/90 06/12/2012     Priority: Medium     Hyperlipidemia LDL goal <130 12/22/2011     Priority: Medium     Anxiety 11/02/2011     Priority: Medium     Allergic conjunctivitis 07/08/2008     Priority: Medium     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, insomnia type 11/17/2006     Priority: Medium     Problem list name updated by automated process. Provider to review       Acute reaction to stress 01/12/2005     Priority: Medium     Problem  list name updated by automated process. Provider to review       Chronic maxillary sinusitis 01/12/2005     Priority: Medium     Contracture of palmar fascia 01/12/2005     Priority: Medium     Benign neoplasm of skin of other and unspecified parts of face 01/12/2005     Priority: Medium     Malignant neoplasm of prostate (H) 11/26/2004     Priority: Medium        Past Medical History:    Past Medical History:   Diagnosis Date     Allergy, unspecified not elsewhere classified      Antiplatelet or antithrombotic long-term use      Anxiety      Arthritis      Chest pain      Chronic sinusitis      Coronary atherosclerosis of unspecified type of vessel, native or graft      Hyperlipidemia      Hypertension      Inguinal hernia      Kidney stones      Malignant neoplasm of prostate (H)      Prostate cancer (H)        Past Surgical History:    Past Surgical History:   Procedure Laterality Date     ARTHRODESIS FOOT  7/23/2013    Procedure: ARTHRODESIS FOOT;  Great Toe Arthrodesis Left Foot;  Surgeon: Ash Gonzalez DPM;  Location: [facility] OR     ARTHRODESIS FOOT  6/10/2014    Procedure: ARTHRODESIS FOOT;  Surgeon: Ash Gonzalez DPM;  Location: [facility] OR     C LAPAROSCOPY, SURGICAL PROSTATECTOMY, RETROPUBIC RADICAL, W/NERVE SPARING  11/30/2004    [facility]     C TOTAL KNEE ARTHROPLASTY  05/01/08    Left knee     COLONOSCOPY  10/7/2013    Procedure: COLONOSCOPY;  Colonoscopy;  Surgeon: Mike Fallon MD;  Location: [facility]     EXTRACORPOREAL SHOCK WAVE LITHOTRIPSY (ESWL) Bilateral 10/18/2017    Procedure: EXTRACORPOREAL SHOCK WAVE LITHOTRIPSY (ESWL);  BILATERAL EXTRACORPOREAL SHOCKWAVE LITHOTRIPSY ;  Surgeon: Meir Torres MD;  Location: [facility] OR     HC CORRECT BUNION,SIMPLE  08/11/2005    x3     HC REMV TOE BENIGN BONE LESN  08/11/2005     HERNIORRHAPHY INGUINAL  7/3/2013    Procedure: HERNIORRHAPHY INGUINAL;  Open Repair Inguinal hernia Right with mesh ;  Surgeon: Luz  MD Sam;  Location: [facility] OR     MOHS MICROGRAPHIC PROCEDURE  08/23/11    ear and chin-[facility]     OPEN REDUCTION INTERNAL FIXATION WRIST Right 7/18/2017    Procedure: OPEN REDUCTION INTERNAL FIXATION WRIST;  Right distal radius open reduction and internal fixation;  Surgeon: Pedro Blanca DO;  Location: [facility]     RECONSTRUCT FOREFOOT WITH METATARSOPHALANGEAL (MTP) FUSION  6/10/2014    Procedure: RECONSTRUCT FOREFOOT WITH METATARSOPHALANGEAL (MTP) FUSION;  Surgeon: Ash Gonzalez DPM;  Location: [facility]     STENT, CORONARY, DEMI       SURGICAL HISTORY OF -   1999/1974    lt knee     SURGICAL HISTORY OF -   10/2004    lithotripsy     SURGICAL HISTORY OF - 11/05    angiogram with stent       Family History:    Family History   Problem Relation Age of Onset     Hypertension Father         has had MI      Connective Tissue Disorder Mother         LUPUS     Heart Disease Mother         poor valve-needing replacement       Social History:  Marital Status:   [2]  Social History     Tobacco Use     Smoking status: Never Smoker     Smokeless tobacco: Never Used   Substance Use Topics     Alcohol use: Yes     Alcohol/week: 5.0 oz     Types: 10 Cans of beer per week     Comment: occasional      Drug use: No        Medications:      ALPRAZolam (XANAX) 0.5 MG tablet   ASPIRIN LOW DOSE 81 MG EC tablet   cetirizine (ZYRTEC) 10 MG tablet   citalopram (CELEXA) 40 MG tablet   HYDROcodone-acetaminophen (NORCO) 7.5-325 MG per tablet   hydrOXYzine (VISTARIL) 25 MG capsule   lisinopril (PRINIVIL/ZESTRIL) 5 MG tablet   nitroglycerin (NITROSTAT) 0.4 MG sublingual tablet   omeprazole (PRILOSEC) 40 MG DR capsule   rosuvastatin (CRESTOR) 40 MG tablet   zolpidem (AMBIEN) 10 MG tablet   CIALIS 20 MG tablet   neomycin-polymyxin-hydrocortisone (CORTISPORIN) 3.5-51362-2 otic suspension   olopatadine (PATANOL) 0.1 % ophthalmic solution   oxyCODONE (ROXICODONE) 5 MG IR tablet         Review of Systems   All  other systems reviewed and are negative.      Physical Exam   BP: (!) 132/93  Heart Rate: 78  Temp: 96.6  F (35.9  C)  Resp: 20  SpO2: 92 %      Physical Exam   Constitutional: He is oriented to person, place, and time. He appears well-developed and well-nourished. No distress.   HENT:   Head: Normocephalic.   Oral mucosa is tacky   Eyes: EOM are normal.   Cardiovascular: Normal rate and regular rhythm.   Pulmonary/Chest: Effort normal and breath sounds normal.   Abdominal: Soft. Normal appearance. There is generalized tenderness ( slightly more in the LLQ). There is no rebound.   Neurological: He is oriented to person, place, and time.   Skin: Skin is warm and dry.       ED Course  (with Medical Decision Making)    69-year-old gentleman with a 3-day history of abdominal cramping and diarrhea after eating undercooked hamburger.  Has not document any fevers and no blood in the stool.  Last course of antibiotics was about 3 months ago for sinus infection.  Nothing more recent.  Every time he eats or drinks has more waves of cramping and diarrhea and his oral fluid intake is been down.    IV placed.  Labs drawn.  If he has more diarrhea will send for stool culture and C. difficile studies along with stool guaiac.  1 L normal saline while waiting for labs to come back.  Zofran for nausea.  Ativan 0.5 mg for the cramping.  Abdominal CT ordered to look for evidence of colitis.    Dr Hernandez will assume his care at change of shift and follow up on the lab/CT results.          Procedures               Critical Care time:  none              Medications   0.9% sodium chloride BOLUS (1,000 mLs Intravenous New Bag 8/5/19 0657)     Followed by   sodium chloride 0.9% infusion (has no administration in time range)   ondansetron (ZOFRAN) injection 4 mg (4 mg Intravenous Given 8/5/19 0658)   sodium chloride (PF) 0.9% PF flush 3 mL (has no administration in time range)   Saline Bag 100mL  CT  flush use only (has no administration in  time range)   iopamidol (ISOVUE-370) solution 500 mL (has no administration in time range)   LORazepam (ATIVAN) injection 0.5 mg (0.5 mg Intravenous Given 8/5/19 0604)       Assessments & Plan     I have reviewed the nursing notes.    I have reviewed the findings, diagnosis, plan and need for follow up with the patient.          Medication List      There are no discharge medications for this visit.         Final diagnoses:   Abdominal pain, generalized       8/5/2019   [facility] EMERGENCY DEPARTMENT     Von Dangelo MD  08/05/19 9888

## 2019-08-06 ENCOUNTER — TELEPHONE (OUTPATIENT)
Dept: EMERGENCY MEDICINE | Facility: CLINIC | Age: 69
End: 2019-08-06

## 2019-08-06 ENCOUNTER — TELEPHONE (OUTPATIENT)
Dept: FAMILY MEDICINE | Facility: CLINIC | Age: 69
End: 2019-08-06

## 2019-08-06 PROCEDURE — 82274 ASSAY TEST FOR BLOOD FECAL: CPT | Performed by: EMERGENCY MEDICINE

## 2019-08-06 PROCEDURE — 87506 IADNA-DNA/RNA PROBE TQ 6-11: CPT | Performed by: EMERGENCY MEDICINE

## 2019-08-06 PROCEDURE — 87493 C DIFF AMPLIFIED PROBE: CPT | Mod: XU | Performed by: EMERGENCY MEDICINE

## 2019-08-06 NOTE — TELEPHONE ENCOUNTER
Reason for call:  Patient reporting a symptom    Symptom or request: Patient was seen in the ED yesterday for stomach cramps and loose stools. He had a lot of lab work done including CDiff. It came back negative. Cramps and loose stools have not gotten any better today. Wondering what he can do for this. Asking for a triage nurse to call him back.     Duration (how long have symptoms been present): 5 days    Have you been treated for this before? Yes    Additional comments:     Phone Number patient can be reached at:  Home number on file [phone] (home)    Best Time:  any    Can we leave a detailed message on this number:  YES    Call taken on 8/6/2019 at 3:36 PM by China Palma

## 2019-08-06 NOTE — TELEPHONE ENCOUNTER
".    S:  New is requesting ATIVAN from Dr David CARMICHAEL New is on day 5 of cramping and diarrhea. He thinks he may have gotten some food poisoning. Currently he states he has \"about a half a dozen loose stools per day.\"  And \"the cramping wakes me up at night.\"  He is aware that cdiff is normal and that he had some blood in his sample.New states he was given a shot of avitivan in the ED and it helped his cramping.  A: cramping and diarrhea  R:Sending to David for review on Wednesday.       Nichol Hernandez RN      "

## 2019-08-06 NOTE — ED TRIAGE NOTES
Attempted to call pt to answer questions about results, but no answer and no identifying voicemail. Called his wife's cell phone also. Voicemail left.

## 2019-08-07 ENCOUNTER — ALLIED HEALTH/NURSE VISIT (OUTPATIENT)
Dept: FAMILY MEDICINE | Facility: CLINIC | Age: 69
End: 2019-08-07
Payer: MEDICARE

## 2019-08-07 ENCOUNTER — TELEPHONE (OUTPATIENT)
Dept: FAMILY MEDICINE | Facility: CLINIC | Age: 69
End: 2019-08-07

## 2019-08-07 DIAGNOSIS — Z23 NEED FOR VACCINATION: Primary | ICD-10-CM

## 2019-08-07 PROCEDURE — 90471 IMMUNIZATION ADMIN: CPT

## 2019-08-07 PROCEDURE — 90750 HZV VACC RECOMBINANT IM: CPT

## 2019-08-07 NOTE — TELEPHONE ENCOUNTER
Per Dr. Hernandez: Patient is already on Xanax which is essentially the same as Ativan. Call in Lomotil 2 tabs by mouth every 6 hours PRN diarrhea #30.  Called patient to notify.

## 2019-08-07 NOTE — TELEPHONE ENCOUNTER
Patient calls reporting his my chart told him he was due for lab work.  States a message was to have been sent to Dr Hernandez  for an order.  He now states to disregard this, he will have this done end of October as advised by Dr Ch.

## 2019-08-15 NOTE — MR AVS SNAPSHOT
After Visit Summary   9/20/2017    Quan Murphy    MRN: [id number]           Patient Information     Date Of Birth          1950        Visit Information        Provider Department      9/20/2017 2:00 PM Pedro Blanca,  [facility]        Today's Diagnoses     Status post total left knee replacement    -  1    Closed Colles' fracture of right radius with routine healing, subsequent encounter           Follow-ups after your visit        Your next 10 appointments already scheduled     Oct 07, 2017 10:00 AM CDT   LAB with NL LAB PMC   [facility] ([facility]    [address] [place] [phone]           Patient must bring picture ID. Patient should be prepared to give a urine specimen  Please do not eat 10-12 hours before your appointment if you are coming in fasting for labs on lipids, cholesterol, or glucose (sugar). Pregnant women should follow their Care Team instructions. Water with medications is okay. Do not drink coffee or other fluids. If you have concerns about taking  your medications, please ask at office or if scheduling via [employer], send a message by clicking on Secure Messaging, Message Your Care Team.            Oct 09, 2017  1:30 PM CDT   Return Visit with Aramis Ch MD   [facility] ([address] [phone]              Who to contact     If you have questions or need follow up information about today's clinic visit or your schedule please contact [facility] directly at [phone].  Normal or non-critical lab and imaging results will be communicated to you by MyChart, letter or phone within 4 business days after the clinic has received the results. If you do not hear from us within 7 days, please contact the clinic through MyChart or phone. If you have a critical or abnormal lab result, we  "will notify you by phone as soon as possible.  Submit refill requests through [employer] or call your pharmacy and they will forward the refill request to us. Please allow 3 business days for your refill to be completed.          Additional Information About Your Visit        [employer] Information     [employer] gives you secure access to your electronic health record. If you see a primary care provider, you can also send messages to your care team and make appointments. If you have questions, please call your primary care clinic.  If you do not have a primary care provider, please call [phone] and they will assist you.        Care EveryWhere ID     This is your Care EveryWhere ID. This could be used by other organizations to access your [place] medical records  [id number]        Your Vitals Were     Temperature Height BMI (Body Mass Index)             97.5  F (36.4  C) (Temporal) 5' 7.9\" (1.725 m) 30 kg/m2          Blood Pressure from Last 3 Encounters:   07/18/17 122/70   07/17/17 118/80   07/14/17 121/83    Weight from Last 3 Encounters:   09/20/17 196 lb 11.2 oz (89.2 kg)   08/18/17 200 lb 8 oz (90.9 kg)   08/03/17 203 lb (92.1 kg)              Today, you had the following     No orders found for display       Primary Care Provider Office Phone # Fax #    Jose Hernandez [phone]       [facility] [address] [place] [address]        Equal Access to Services     [facility] : Hadii aad ku hadasho Soomaali, waaxda luqadaha, qaybta kaalmada adeegyada, mitchell hooper . So [facility] [phone].    ATENCIÓN: Si habla español, tiene a recinos disposición servicios gratuitos de asistencia lingüística. Llame al [phone].    We comply with applicable federal civil rights laws and [place] laws. We do not discriminate on the basis of race, color, national origin, age, disability sex, sexual orientation or gender identity.            Thank you!     " Thank you for choosing [facility]  for your care. Our goal is always to provide you with excellent care. Hearing back from our patients is one way we can continue to improve our services. Please take a few minutes to complete the written survey that you may receive in the mail after your visit with us. Thank you!             Your Updated Medication List - Protect others around you: Learn how to safely use, store and throw away your medicines at www.disposemymeds.org.          This list is accurate as of: 9/20/17  2:20 PM.  Always use your most recent med list.                   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)       cetirizine 10 MG tablet    zyrTEC     Take 10 mg by mouth daily        CIALIS 20 MG tablet   Generic drug:  tadalafil      Take 20 mg by mouth daily as needed        citalopram 40 MG tablet    celeXA    30 tablet    Take 1 tablet (40 mg) by mouth daily    Anxiety       clopidogrel 75 MG tablet    PLAVIX    90 tablet    Take 1 tablet (75 mg) by mouth daily    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 If symptoms persist 5 minutes after 1st dose call  911.    Atherosclerosis of native coronary artery of native heart with unstable angina pectoris (H)       oxyCODONE 5 MG capsule    OXY-IR    50 capsule    Take 1-2 capsules (5-10 mg) by mouth every 8 hours as needed for moderate to severe pain    Closed Colles' fracture of right radius with routine healing, subsequent encounter       rosuvastatin 40 MG tablet    CRESTOR    90 tablet    Take 1 tablet (40 mg) by mouth daily    Hyperlipidemia LDL goal <130       zolpidem 10 MG tablet    AMBIEN    30 tablet    Take 1 tablet (10 mg) by mouth nightly as needed for sleep    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, insomnia type          No

## 2019-08-22 ENCOUNTER — MYC REFILL (OUTPATIENT)
Dept: FAMILY MEDICINE | Facility: CLINIC | Age: 69
End: 2019-08-22

## 2019-08-22 DIAGNOSIS — F41.9 ANXIETY: ICD-10-CM

## 2019-08-22 DIAGNOSIS — M12.9 ARTHROPATHY: ICD-10-CM

## 2019-08-22 DIAGNOSIS — S37.019A KIDNEY HEMATOMA, UNSPECIFIED LATERALITY, INITIAL ENCOUNTER: ICD-10-CM

## 2019-08-22 DIAGNOSIS — J32.0 CHRONIC MAXILLARY SINUSITIS: ICD-10-CM

## 2019-08-22 RX ORDER — ALPRAZOLAM 0.5 MG
0.5 TABLET ORAL 3 TIMES DAILY PRN
Qty: 90 TABLET | Refills: 1 | Status: SHIPPED | OUTPATIENT
Start: 2019-08-22 | End: 2019-10-15

## 2019-08-22 RX ORDER — HYDROCODONE BITARTRATE AND ACETAMINOPHEN 7.5; 325 MG/1; MG/1
1 TABLET ORAL EVERY 6 HOURS PRN
Qty: 120 TABLET | Refills: 0 | Status: SHIPPED | OUTPATIENT
Start: 2019-08-22 | End: 2019-09-18

## 2019-08-22 NOTE — TELEPHONE ENCOUNTER
Norco 7.5-325 MG       Last Written Prescription Date:  8/1/19  Last Fill Quantity: 120,   # refills: 0  Last Office Visit: 3/15/19  Future Office visit:       Routing refill request to provider for review/approval because:  Drug not on the [facility], [facility] or [facility] refill protocol or controlled substance  Alprazolam   0.5 MG     Last Written Prescription Date:  6-26-19  Last Fill Quantity: 90,   # refills: 1  Last Office Visit: 3/15/19  Future Office visit:       Routing refill request to provider for review/approval because:  Drug not on the [facility], [facility] or [facility] refill protocol or controlled substance

## 2019-09-02 DIAGNOSIS — I10 BENIGN ESSENTIAL HYPERTENSION: ICD-10-CM

## 2019-09-04 RX ORDER — ASPIRIN 81 MG/1
TABLET ORAL
Qty: 90 TABLET | Refills: 1 | Status: SHIPPED | OUTPATIENT
Start: 2019-09-04 | End: 2020-03-06

## 2019-09-04 NOTE — TELEPHONE ENCOUNTER
"Aspirin  Last Written Prescription Date:  2/15/2019  Last Fill Quantity: 90,  # refills: 1   Last office visit: 3/15/2019 with prescribing provider:  David   Future Office Visit:      Requested Prescriptions   Pending Prescriptions Disp Refills     ASPIRIN ADULT LOW STRENGTH 81 MG EC tablet [Pharmacy Med Name: ASPIRIN ADULT LOW STRENGTH 81 TBEC] 90 tablet 1     Sig: TAKE ONE TABLET BY MOUTH ONCE DAILY       Analgesics (Non-Narcotic Tylenol and ASA Only) Passed - 9/2/2019 12:41 PM        Passed - Recent (12 mo) or future (30 days) visit within the authorizing provider's specialty     Patient had office visit in the last 12 months or has a visit in the next 30 days with authorizing provider or within the authorizing provider's specialty.  See \"Patient Info\" tab in inbasket, or \"Choose Columns\" in Meds & Orders section of the refill encounter.              Passed - Patient is age 20 years or older     If ASA is flagged for ages under 20 years old. Forward to provider for confirmation Ryes Syndrome is not a concern.              Passed - Medication is active on med list        Prescription approved per RN refill protocol.  Chela Garsia RN on 9/4/2019 at 12:18 PM    "

## 2019-09-18 ENCOUNTER — MYC REFILL (OUTPATIENT)
Dept: FAMILY MEDICINE | Facility: CLINIC | Age: 69
End: 2019-09-18

## 2019-09-18 DIAGNOSIS — G47.01 SLEEP DISORDER DUE TO A GENERAL MEDICAL CONDITION, INSOMNIA TYPE: ICD-10-CM

## 2019-09-18 DIAGNOSIS — M12.9 ARTHROPATHY: ICD-10-CM

## 2019-09-18 DIAGNOSIS — J32.0 CHRONIC MAXILLARY SINUSITIS: ICD-10-CM

## 2019-09-18 DIAGNOSIS — S37.019A KIDNEY HEMATOMA, UNSPECIFIED LATERALITY, INITIAL ENCOUNTER: ICD-10-CM

## 2019-09-18 RX ORDER — HYDROCODONE BITARTRATE AND ACETAMINOPHEN 7.5; 325 MG/1; MG/1
1 TABLET ORAL EVERY 6 HOURS PRN
Qty: 120 TABLET | Refills: 0 | Status: SHIPPED | OUTPATIENT
Start: 2019-09-18 | End: 2019-10-22

## 2019-09-18 RX ORDER — ZOLPIDEM TARTRATE 10 MG/1
10 TABLET ORAL
Qty: 30 TABLET | Refills: 5 | Status: SHIPPED | OUTPATIENT
Start: 2019-09-18 | End: 2020-03-06

## 2019-09-18 NOTE — TELEPHONE ENCOUNTER
Hydrocodone and zolpidem      Last Written Prescription Date:  08/22/19-hydro, 04/03/19-zolpidem  Last Fill Quantity: 120, 30,   # refills: 0,5  Last Office Visit: 03/15/19  Future Office visit:       Routing refill request to provider for review/approval because:  Drug not on the [facility], P or [facility] refill protocol or controlled substance

## 2019-09-23 ENCOUNTER — MYC REFILL (OUTPATIENT)
Dept: FAMILY MEDICINE | Facility: CLINIC | Age: 69
End: 2019-09-23

## 2019-09-23 ENCOUNTER — IMMUNIZATION (OUTPATIENT)
Dept: FAMILY MEDICINE | Facility: CLINIC | Age: 69
End: 2019-09-23
Payer: MEDICARE

## 2019-09-23 DIAGNOSIS — M12.9 ARTHROPATHY: ICD-10-CM

## 2019-09-23 DIAGNOSIS — Z23 NEED FOR PROPHYLACTIC VACCINATION AND INOCULATION AGAINST INFLUENZA: Primary | ICD-10-CM

## 2019-09-23 PROCEDURE — G0008 ADMIN INFLUENZA VIRUS VAC: HCPCS

## 2019-09-23 PROCEDURE — 90662 IIV NO PRSV INCREASED AG IM: CPT

## 2019-09-23 RX ORDER — HYDROXYZINE PAMOATE 25 MG/1
25 CAPSULE ORAL 3 TIMES DAILY PRN
Qty: 60 CAPSULE | Refills: 3 | Status: CANCELLED | OUTPATIENT
Start: 2019-09-23

## 2019-09-25 RX ORDER — HYDROXYZINE PAMOATE 25 MG/1
25 CAPSULE ORAL 3 TIMES DAILY PRN
Qty: 60 CAPSULE | Refills: 3 | Status: SHIPPED | OUTPATIENT
Start: 2019-09-25 | End: 2019-12-05

## 2019-09-25 RX ORDER — HYDROXYZINE PAMOATE 25 MG/1
CAPSULE ORAL
Qty: 60 CAPSULE | Refills: 3 | OUTPATIENT
Start: 2019-09-25

## 2019-09-25 NOTE — TELEPHONE ENCOUNTER
"Hydroxyzine  Last Written Prescription Date:  5/21/2019  Last Fill Quantity: 60,  # refills: 3   Last office visit: 3/15/2019 with prescribing provider:  David   Future Office Visit:   Next 5 appointments (look out 90 days)    Nov 12, 2019  1:00 PM CST  Return Visit with Aramis Ch MD  [facility] ([facility]) [address] [place] [id number] [phone]           Requested Prescriptions   Pending Prescriptions Disp Refills     hydrOXYzine (VISTARIL) 25 MG capsule 60 capsule 3     Sig: Take 1 capsule (25 mg) by mouth 3 times daily as needed for anxiety       Antihistamines Protocol Passed - 9/23/2019 10:57 AM        Passed - Recent (12 mo) or future (30 days) visit within the authorizing provider's specialty     Patient had office visit in the last 12 months or has a visit in the next 30 days with authorizing provider or within the authorizing provider's specialty.  See \"Patient Info\" tab in inbasket, or \"Choose Columns\" in Meds & Orders section of the refill encounter.              Passed - Patient is age 3 or older     Apply age and/or weight-based dosing for peds patients age 3 and older.    Forward request to provider for patients under the age of 3.          Passed - Medication is active on med list        Prescription approved per RN refill protocol.  Chela Garsia RN on 9/25/2019 at 12:34 PM    "

## 2019-09-28 ENCOUNTER — HEALTH MAINTENANCE LETTER (OUTPATIENT)
Age: 69
End: 2019-09-28

## 2019-10-15 ENCOUNTER — MYC REFILL (OUTPATIENT)
Dept: FAMILY MEDICINE | Facility: CLINIC | Age: 69
End: 2019-10-15

## 2019-10-15 DIAGNOSIS — F41.9 ANXIETY: ICD-10-CM

## 2019-10-15 DIAGNOSIS — M12.9 ARTHROPATHY: ICD-10-CM

## 2019-10-15 RX ORDER — HYDROXYZINE PAMOATE 25 MG/1
25 CAPSULE ORAL 3 TIMES DAILY PRN
Qty: 60 CAPSULE | Refills: 3 | Status: CANCELLED | OUTPATIENT
Start: 2019-10-15

## 2019-10-16 RX ORDER — ALPRAZOLAM 0.5 MG
0.5 TABLET ORAL 3 TIMES DAILY PRN
Qty: 90 TABLET | Refills: 1 | Status: SHIPPED | OUTPATIENT
Start: 2019-10-16 | End: 2019-12-13

## 2019-10-16 NOTE — TELEPHONE ENCOUNTER
xanax      Last Written Prescription Date:  8/22/19  Last Fill Quantity: 90,   # refills: 1  Last Office Visit: 3/15/19  Future Office visit:    Next 5 appointments (look out 90 days)    Nov 12, 2019  1:00 PM CST  Return Visit with Aramis Ch MD  [facility] ([facility]) [address] [place] [id number] [phone]           Routing refill request to provider for review/approval because:  Drug not on the [facility], [facility] or  Health refill protocol or controlled substance    He has refills remaining on his vistaril.

## 2019-10-22 ENCOUNTER — MYC REFILL (OUTPATIENT)
Dept: FAMILY MEDICINE | Facility: CLINIC | Age: 69
End: 2019-10-22

## 2019-10-22 DIAGNOSIS — S37.019A KIDNEY HEMATOMA, UNSPECIFIED LATERALITY, INITIAL ENCOUNTER: ICD-10-CM

## 2019-10-22 DIAGNOSIS — J32.0 CHRONIC MAXILLARY SINUSITIS: ICD-10-CM

## 2019-10-22 DIAGNOSIS — M12.9 ARTHROPATHY: ICD-10-CM

## 2019-10-22 NOTE — TELEPHONE ENCOUNTER
HYDROcodone-acetaminophen (NORCO) 7.5-325 MG per tablet       Last Written Prescription Date:  9/18/19  Last Fill Quantity: 120,   # refills: 0  Last Office Visit: 3/15/19  Future Office visit:    Next 5 appointments (look out 90 days)    Nov 12, 2019  1:00 PM CST  Return Visit with Aramis Ch MD  [facility] ([address] [phone]           Routing refill request to provider for review/approval because:  Drug not on the [facility], [facility] or  Health refill protocol or controlled substance

## 2019-10-23 RX ORDER — HYDROCODONE BITARTRATE AND ACETAMINOPHEN 7.5; 325 MG/1; MG/1
1 TABLET ORAL EVERY 6 HOURS PRN
Qty: 120 TABLET | Refills: 0 | Status: SHIPPED | OUTPATIENT
Start: 2019-10-23 | End: 2019-11-17

## 2019-11-04 ENCOUNTER — HOSPITAL ENCOUNTER (OUTPATIENT)
Dept: GENERAL RADIOLOGY | Facility: CLINIC | Age: 69
Discharge: HOME | End: 2019-11-04
Attending: UROLOGY | Admitting: UROLOGY
Payer: MEDICARE

## 2019-11-04 ENCOUNTER — TRANSFERRED RECORDS (OUTPATIENT)
Dept: HEALTH INFORMATION MANAGEMENT | Facility: CLINIC | Age: 69
End: 2019-11-04

## 2019-11-04 DIAGNOSIS — R35.1 NOCTURIA: ICD-10-CM

## 2019-11-04 DIAGNOSIS — E78.5 HYPERLIPIDEMIA LDL GOAL <130: ICD-10-CM

## 2019-11-04 LAB
ALT SERPL W P-5'-P-CCNC: 38 U/L (ref 0–70)
CHOLEST SERPL-MCNC: 159 MG/DL
HDLC SERPL-MCNC: 61 MG/DL
LDLC SERPL CALC-MCNC: 45 MG/DL
NONHDLC SERPL-MCNC: 98 MG/DL
TRIGL SERPL-MCNC: 266 MG/DL

## 2019-11-04 PROCEDURE — 80061 LIPID PANEL: CPT | Performed by: INTERNAL MEDICINE

## 2019-11-04 PROCEDURE — 74019 RADEX ABDOMEN 2 VIEWS: CPT | Mod: TC

## 2019-11-04 PROCEDURE — 36415 COLL VENOUS BLD VENIPUNCTURE: CPT | Performed by: INTERNAL MEDICINE

## 2019-11-04 PROCEDURE — 84460 ALANINE AMINO (ALT) (SGPT): CPT | Performed by: INTERNAL MEDICINE

## 2019-11-12 ENCOUNTER — OFFICE VISIT (OUTPATIENT)
Dept: CARDIOLOGY | Facility: CLINIC | Age: 69
End: 2019-11-12
Payer: MEDICARE

## 2019-11-12 VITALS
HEART RATE: 74 BPM | OXYGEN SATURATION: 97 % | RESPIRATION RATE: 12 BRPM | BODY MASS INDEX: 32.88 KG/M2 | WEIGHT: 222 LBS | SYSTOLIC BLOOD PRESSURE: 142 MMHG | HEIGHT: 69 IN | DIASTOLIC BLOOD PRESSURE: 78 MMHG

## 2019-11-12 DIAGNOSIS — I10 BENIGN ESSENTIAL HYPERTENSION: ICD-10-CM

## 2019-11-12 DIAGNOSIS — E78.5 HYPERLIPIDEMIA LDL GOAL <70: ICD-10-CM

## 2019-11-12 DIAGNOSIS — I25.750 CORONARY ARTERY DISEASE INVOLVING NATIVE ARTERY OF TRANSPLANTED HEART WITH UNSTABLE ANGINA PECTORIS (H): Primary | ICD-10-CM

## 2019-11-12 PROCEDURE — 99214 OFFICE O/P EST MOD 30 MIN: CPT | Performed by: INTERNAL MEDICINE

## 2019-11-12 RX ORDER — LISINOPRIL 10 MG/1
10 TABLET ORAL DAILY
Qty: 90 TABLET | Refills: 3 | Status: SHIPPED | OUTPATIENT
Start: 2019-11-12 | End: 2020-10-08

## 2019-11-12 ASSESSMENT — MIFFLIN-ST. JEOR: SCORE: 1759.43

## 2019-11-12 ASSESSMENT — PAIN SCALES - GENERAL: PAINLEVEL: NO PAIN (0)

## 2019-11-12 NOTE — PROGRESS NOTES
HPI and Plan:   See dictation    Orders Placed This Encounter   Procedures     Lipid Profile     ALT     Basic metabolic panel     Follow-Up with Cardiologist       Orders Placed This Encounter   Medications     lisinopril (PRINIVIL/ZESTRIL) 10 MG tablet     Sig: Take 1 tablet (10 mg) by mouth daily     Dispense:  90 tablet     Refill:  3       Medications Discontinued During This Encounter   Medication Reason     lisinopril (PRINIVIL/ZESTRIL) 5 MG tablet          Encounter Diagnoses   Name Primary?     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) Yes     Hyperlipidemia LDL goal <70        CURRENT MEDICATIONS:  Current Outpatient Medications   Medication Sig Dispense Refill     ALPRAZolam (XANAX) 0.5 MG tablet Take 1 tablet (0.5 mg) by mouth 3 times daily as needed for anxiety 90 tablet 1     ASPIRIN ADULT LOW STRENGTH 81 MG EC tablet TAKE ONE TABLET BY MOUTH ONCE DAILY 90 tablet 1     CIALIS 20 MG tablet Take 20 mg by mouth daily as needed        citalopram (CELEXA) 40 MG tablet Take 1 tablet (40 mg) by mouth daily 30 tablet 10     HYDROcodone-acetaminophen (NORCO) 7.5-325 MG per tablet Take 1 tablet by mouth every 6 hours as needed for moderate to severe pain maximum 4 tablet(s) per day 120 tablet 0     hydrOXYzine (VISTARIL) 25 MG capsule Take 1 capsule (25 mg) by mouth 3 times daily as needed for anxiety 60 capsule 3     lisinopril (PRINIVIL/ZESTRIL) 10 MG tablet Take 1 tablet (10 mg) by mouth daily 90 tablet 3     omeprazole (PRILOSEC) 40 MG DR capsule Take 1 capsule (40 mg) by mouth daily 90 capsule 2     rosuvastatin (CRESTOR) 40 MG tablet Take 1 tablet (40 mg) by mouth daily 90 tablet 3     zolpidem (AMBIEN) 10 MG tablet Take 1 tablet (10 mg) by mouth nightly as needed for sleep 30 tablet 5     cetirizine (ZYRTEC) 10 MG tablet Take 10 mg by mouth daily       neomycin-polymyxin-hydrocortisone (CORTISPORIN)  3.5-61498-9 otic suspension Place 4 drops Into the left ear 4 times daily (Patient not taking: Reported on 11/6/2018) 10 mL 0     nitroglycerin (NITROSTAT) 0.4 MG sublingual tablet For chest pain place 1 tablet under the tongue every 5 minutes for 3 doses. If symptoms persist 5 minutes after 1st dose call 911. (Patient not taking: Reported on 11/12/2019) 25 tablet 0     olopatadine (PATANOL) 0.1 % ophthalmic solution Place 1 drop into both eyes 2 times daily (Patient not taking: Reported on 3/15/2019) 5 mL 3     oxyCODONE (ROXICODONE) 5 MG IR tablet Take 1 tablet (5 mg) by mouth every 4 hours as needed for pain maximum 4 tablet(s) per day (Patient not taking: Reported on 11/6/2018) 20 tablet 0       ALLERGIES     Allergies   Allergen Reactions     Animal Dander      Dust Mites      Pollen Extract      Smoke.        PAST MEDICAL HISTORY:  Past Medical History:   Diagnosis Date     Allergy, unspecified not elsewhere classified     Seasonal allergies, pollen, dust, smoke and animals     Antiplatelet or antithrombotic long-term use      Anxiety      Arthritis      Chest pain      Chronic sinusitis      Coronary atherosclerosis of unspecified type of vessel, native or graft     Coronary artery disease     Hyperlipidemia      Hypertension      Inguinal hernia      Kidney stones      Malignant neoplasm of prostate (H)     Prostate cancer     Prostate cancer (H)        PAST SURGICAL HISTORY:  Past Surgical History:   Procedure Laterality Date     ARTHRODESIS FOOT  7/23/2013    Procedure: ARTHRODESIS FOOT;  Great Toe Arthrodesis Left Foot;  Surgeon: Ash Gonzalez DPM;  Location: [facility] OR     ARTHRODESIS FOOT  6/10/2014    Procedure: ARTHRODESIS FOOT;  Surgeon: Ash Gonzalez DPM;  Location: [facility] OR     C LAPAROSCOPY, SURGICAL PROSTATECTOMY, RETROPUBIC RADICAL, W/NERVE SPARING  11/30/2004    With full bilateral pelvic lymphadenectomy.  F[facility].     C TOTAL KNEE ARTHROPLASTY  05/01/08    Left knee     COLONOSCOPY   10/7/2013    Procedure: COLONOSCOPY;  Colonoscopy;  Surgeon: Mike Fallon MD;  Location: [facility]     EXTRACORPOREAL SHOCK WAVE LITHOTRIPSY (ESWL) Bilateral 10/18/2017    Procedure: EXTRACORPOREAL SHOCK WAVE LITHOTRIPSY (ESWL);  BILATERAL EXTRACORPOREAL SHOCKWAVE LITHOTRIPSY ;  Surgeon: Meir Torres MD;  Location: [facility] OR     HC CORRECT BUNION,SIMPLE  08/11/2005    x3     HC REMV TOE BENIGN BONE LESN  08/11/2005     HERNIORRHAPHY INGUINAL  7/3/2013    Procedure: HERNIORRHAPHY INGUINAL;  Open Repair Inguinal hernia Right with mesh ;  Surgeon: Sam Escobar MD;  Location: [facility] OR     MOHS MICROGRAPHIC PROCEDURE  08/23/11    ear and chin-[facility]     OPEN REDUCTION INTERNAL FIXATION WRIST Right 7/18/2017    Procedure: OPEN REDUCTION INTERNAL FIXATION WRIST;  Right distal radius open reduction and internal fixation;  Surgeon: Pedro Blanca DO;  Location: [facility]     RECONSTRUCT FOREFOOT WITH METATARSOPHALANGEAL (MTP) FUSION  6/10/2014    Procedure: RECONSTRUCT FOREFOOT WITH METATARSOPHALANGEAL (MTP) FUSION;  Surgeon: Ash Gonzalez DPM;  Location: [facility]     STENT, CORONARY, DEMI       SURGICAL HISTORY OF -   1999/1974    lt knee     SURGICAL HISTORY OF -   10/2004    lithotripsy     SURGICAL HISTORY OF - 11/05    angiogram with stent       FAMILY HISTORY:  Family History   Problem Relation Age of Onset     Hypertension Father         has had MI      Connective Tissue Disorder Mother         LUPUS     Heart Disease Mother         poor valve-needing replacement       SOCIAL HISTORY:  Social History     Socioeconomic History     Marital status:      Spouse name: Alessandra     Number of children: 2     Years of education: Not on file     Highest education level: Not on file   Occupational History     Not on file   Social Needs     Financial resource strain: Not on file     Food insecurity:     Worry: Not on file     Inability: Not on file     Transportation needs:     Medical: Not on  file     Non-medical: Not on file   Tobacco Use     Smoking status: Never Smoker     Smokeless tobacco: Never Used   Substance and Sexual Activity     Alcohol use: Yes     Alcohol/week: 8.3 standard drinks     Types: 10 Cans of beer per week     Comment: occasional      Drug use: No     Sexual activity: Yes     Partners: Female   Lifestyle     Physical activity:     Days per week: Not on file     Minutes per session: Not on file     Stress: Not on file   Relationships     Social connections:     Talks on phone: Not on file     Gets together: Not on file     Attends Islam service: Not on file     Active member of club or organization: Not on file     Attends meetings of clubs or organizations: Not on file     Relationship status: Not on file     Intimate partner violence:     Fear of current or ex partner: Not on file     Emotionally abused: Not on file     Physically abused: Not on file     Forced sexual activity: Not on file   Other Topics Concern      Service Not Asked     Blood Transfusions Not Asked     Caffeine Concern Not Asked     Occupational Exposure Not Asked     Hobby Hazards Not Asked     Sleep Concern Not Asked     Stress Concern Not Asked     Weight Concern Not Asked     Special Diet Not Asked     Back Care Not Asked     Exercise Not Asked     Bike Helmet Not Asked     Seat Belt Not Asked     Self-Exams Not Asked     Parent/sibling w/ CABG, MI or angioplasty before 65F 55M? Not Asked   Social History Narrative     Not on file       Review of Systems:  Skin:  Negative       Eyes:  Negative      ENT:  Negative postnasal drainage chronic  Respiratory:  Negative       Cardiovascular:  Negative for;edema;lightheadedness;dizziness;fatigue;palpitations;chest pain      Gastroenterology: Negative      Genitourinary:  Positive for kidney stone problem with kidney stones will f/u with Dr. Wang  Musculoskeletal:  Positive for joint pain right wrist had plate put in due to fall this summer  "  Neurologic:  Negative      Psychiatric:  Negative sleep disturbances;anxiety;depression worries  Heme/Lymph/Imm:  Positive for allergies    Endocrine:  Negative        Physical Exam:  Vitals: BP (!) 142/78 (BP Location: Right arm, Cuff Size: Adult Regular)   Pulse 74   Resp 12   Ht 1.74 m (5' 8.5\")   Wt 100.7 kg (222 lb)   SpO2 97%   BMI 33.26 kg/m      Constitutional:  cooperative, alert and oriented, well developed, well nourished, in no acute distress        Skin:  warm and dry to the touch, no apparent skin lesions or masses noted          Head:  normocephalic, no masses or lesions        Eyes:  pupils equal and round        Lymph:No Cervical lymphadenopathy present     ENT:  no pallor or cyanosis, dentition good        Neck:           Respiratory:  clear to auscultation;normal symmetry         Cardiac: regular rhythm;normal S1 and S2     no presence of murmur          pulses full and equal, no bruits auscultated                                   Groin site intact    GI:  abdomen soft;BS normoactive        Extremities and Muscular Skeletal:  no edema;no deformities, clubbing, cyanosis, erythema observed              Neurological:  no gross motor deficits;affect appropriate        Psych:  oriented to time, person and place        CC  No referring provider defined for this encounter.              "

## 2019-11-12 NOTE — PATIENT INSTRUCTIONS
Take Blood pressure at home periodically, a couple times each week, and let me know if the blood pressure is over 140/90.     Increase lisinopril to 10 mg daily. If you feel lightheaded, cut back to lisinopril 5 mg daily and keep checking blood pressures.

## 2019-11-12 NOTE — LETTER
11/12/2019      Jose Hernandez MD  [address] [facility] [address]      RE: Quan Murphy       Dear Colleague,    I had the pleasure of seeing Quan Murphy in the [facility].    Service Date: 11/12/2019      HISTORY OF PRESENT ILLNESS:  I had the opportunity to see Mr. Quan Murphy in Cardiology Clinic today at the [facility] in [place] for reevaluation of coronary artery disease and dyslipidemia.  Mr. Murphy is a 69-year-old gentleman who had symptoms of left shoulder and left arm discomfort in 12/2016 when he presented to [facility] in [place].  He was transferred to [facility] and found to have evidence of severe proximal LAD disease treated with angioplasty and stenting.  He had no evidence of infarction and his left ventricular function was normal.  He was started on metoprolol and lisinopril, but they were discontinued due to low blood pressures.  His cholesterol numbers have been well controlled on Crestor.      This year, he has been doing well.  He has not been having any recurrent chest, shoulder or left arm discomfort symptoms.  He has been able to tolerate a low dose of lisinopril 5 mg daily without lightheadedness.  His cholesterol numbers are excellent with an LDL down to 45.  His basic metabolic panel and ALT are normal.      PHYSICAL EXAMINATION:  His blood pressure was 142/78, heart rate 74 and weight 222 pounds.  He is up about 10 pounds since last year.  His lungs are clear.  His heart rhythm is regular.  There are no cardiac murmurs or carotid bruits.      IMPRESSIONS:  Mr. Quan Murphy is a 69-year-old gentleman with dyslipidemia and probably mild hypertension and a history of coronary artery disease with stenting of the proximal LAD in 2016.      I suggested that we try increasing his lisinopril to 10 mg a day to get better blood pressure control.  If he has lightheadedness, he  will decrease that back down to 5 mg a day.  His wife is a nurse and she will take his blood pressure periodically and let us know if it continues to run high in which case we can increase his medications further.  Otherwise, I will have him continue his other medications and plan on following up with me again in 1 year for reevaluation.      cc:   Jose Hernandez MD   [address]  [address]         AIDEN ALEXANDRE MD, [facility]             D: 2019   T: 2019   MT: ELANA      Name:     CHUCHO MONTOYA   MRN:      [id number]        Account:      [id number]   :      1950           Service Date: 2019      Document: [id number]         Outpatient Encounter Medications as of 2019   Medication Sig Dispense Refill     ALPRAZolam (XANAX) 0.5 MG tablet Take 1 tablet (0.5 mg) by mouth 3 times daily as needed for anxiety 90 tablet 1     ASPIRIN ADULT LOW STRENGTH 81 MG EC tablet TAKE ONE TABLET BY MOUTH ONCE DAILY 90 tablet 1     CIALIS 20 MG tablet Take 20 mg by mouth daily as needed        citalopram (CELEXA) 40 MG tablet Take 1 tablet (40 mg) by mouth daily 30 tablet 10     HYDROcodone-acetaminophen (NORCO) 7.5-325 MG per tablet Take 1 tablet by mouth every 6 hours as needed for moderate to severe pain maximum 4 tablet(s) per day 120 tablet 0     hydrOXYzine (VISTARIL) 25 MG capsule Take 1 capsule (25 mg) by mouth 3 times daily as needed for anxiety 60 capsule 3     lisinopril (PRINIVIL/ZESTRIL) 10 MG tablet Take 1 tablet (10 mg) by mouth daily 90 tablet 3     omeprazole (PRILOSEC) 40 MG DR capsule Take 1 capsule (40 mg) by mouth daily 90 capsule 2     rosuvastatin (CRESTOR) 40 MG tablet Take 1 tablet (40 mg) by mouth daily 90 tablet 3     zolpidem (AMBIEN) 10 MG tablet Take 1 tablet (10 mg) by mouth nightly as needed for sleep 30 tablet 5     cetirizine (ZYRTEC) 10 MG tablet Take 10 mg by mouth daily       neomycin-polymyxin-hydrocortisone  (CORTISPORIN) 3.5-06879-6 otic suspension Place 4 drops Into the left ear 4 times daily (Patient not taking: Reported on 11/6/2018) 10 mL 0     nitroglycerin (NITROSTAT) 0.4 MG sublingual tablet For chest pain place 1 tablet under the tongue every 5 minutes for 3 doses. If symptoms persist 5 minutes after 1st dose call 911. (Patient not taking: Reported on 11/12/2019) 25 tablet 0     olopatadine (PATANOL) 0.1 % ophthalmic solution Place 1 drop into both eyes 2 times daily (Patient not taking: Reported on 3/15/2019) 5 mL 3     oxyCODONE (ROXICODONE) 5 MG IR tablet Take 1 tablet (5 mg) by mouth every 4 hours as needed for pain maximum 4 tablet(s) per day (Patient not taking: Reported on 11/6/2018) 20 tablet 0     [DISCONTINUED] lisinopril (PRINIVIL/ZESTRIL) 5 MG tablet Take 1 tablet (5 mg) by mouth daily 90 tablet 3     No facility-administered encounter medications on file as of 11/12/2019.        Again, thank you for allowing me to participate in the care of your patient.      Sincerely,    AIDEN ALEXANDRE MD     [facility]

## 2019-11-12 NOTE — LETTER
11/12/2019    Jose Hernandez MD  [address] [facility] [address]    RE: Quan Murphy       Dear Colleague,    I had the pleasure of seeing Quan Murphy in the [facility].    HPI and Plan:   See dictation    Orders Placed This Encounter   Procedures     Lipid Profile     ALT     Basic metabolic panel     Follow-Up with Cardiologist       Orders Placed This Encounter   Medications     lisinopril (PRINIVIL/ZESTRIL) 10 MG tablet     Sig: Take 1 tablet (10 mg) by mouth daily     Dispense:  90 tablet     Refill:  3       Medications Discontinued During This Encounter   Medication Reason     lisinopril (PRINIVIL/ZESTRIL) 5 MG tablet          Encounter Diagnoses   Name Primary?     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) Yes     Hyperlipidemia LDL goal <70        CURRENT MEDICATIONS:  Current Outpatient Medications   Medication Sig Dispense Refill     ALPRAZolam (XANAX) 0.5 MG tablet Take 1 tablet (0.5 mg) by mouth 3 times daily as needed for anxiety 90 tablet 1     ASPIRIN ADULT LOW STRENGTH 81 MG EC tablet TAKE ONE TABLET BY MOUTH ONCE DAILY 90 tablet 1     CIALIS 20 MG tablet Take 20 mg by mouth daily as needed        citalopram (CELEXA) 40 MG tablet Take 1 tablet (40 mg) by mouth daily 30 tablet 10     HYDROcodone-acetaminophen (NORCO) 7.5-325 MG per tablet Take 1 tablet by mouth every 6 hours as needed for moderate to severe pain maximum 4 tablet(s) per day 120 tablet 0     hydrOXYzine (VISTARIL) 25 MG capsule Take 1 capsule (25 mg) by mouth 3 times daily as needed for anxiety 60 capsule 3     lisinopril (PRINIVIL/ZESTRIL) 10 MG tablet Take 1 tablet (10 mg) by mouth daily 90 tablet 3     omeprazole (PRILOSEC) 40 MG DR capsule Take 1 capsule (40 mg) by mouth daily 90 capsule 2     rosuvastatin (CRESTOR) 40 MG tablet Take 1 tablet (40 mg) by mouth daily 90 tablet 3      zolpidem (AMBIEN) 10 MG tablet Take 1 tablet (10 mg) by mouth nightly as needed for sleep 30 tablet 5     cetirizine (ZYRTEC) 10 MG tablet Take 10 mg by mouth daily       neomycin-polymyxin-hydrocortisone (CORTISPORIN) 3.5-84881-3 otic suspension Place 4 drops Into the left ear 4 times daily (Patient not taking: Reported on 11/6/2018) 10 mL 0     nitroglycerin (NITROSTAT) 0.4 MG sublingual tablet For chest pain place 1 tablet under the tongue every 5 minutes for 3 doses. If symptoms persist 5 minutes after 1st dose call 911. (Patient not taking: Reported on 11/12/2019) 25 tablet 0     olopatadine (PATANOL) 0.1 % ophthalmic solution Place 1 drop into both eyes 2 times daily (Patient not taking: Reported on 3/15/2019) 5 mL 3     oxyCODONE (ROXICODONE) 5 MG IR tablet Take 1 tablet (5 mg) by mouth every 4 hours as needed for pain maximum 4 tablet(s) per day (Patient not taking: Reported on 11/6/2018) 20 tablet 0       ALLERGIES     Allergies   Allergen Reactions     Animal Dander      Dust Mites      Pollen Extract      Smoke.        PAST MEDICAL HISTORY:  Past Medical History:   Diagnosis Date     Allergy, unspecified not elsewhere classified     Seasonal allergies, pollen, dust, smoke and animals     Antiplatelet or antithrombotic long-term use      Anxiety      Arthritis      Chest pain      Chronic sinusitis      Coronary atherosclerosis of unspecified type of vessel, native or graft     Coronary artery disease     Hyperlipidemia      Hypertension      Inguinal hernia      Kidney stones      Malignant neoplasm of prostate (H)     Prostate cancer     Prostate cancer (H)        PAST SURGICAL HISTORY:  Past Surgical History:   Procedure Laterality Date     ARTHRODESIS FOOT  7/23/2013    Procedure: ARTHRODESIS FOOT;  Great Toe Arthrodesis Left Foot;  Surgeon: Ash Gonzalez DPM;  Location: [facility] OR     ARTHRODESIS FOOT  6/10/2014    Procedure: ARTHRODESIS FOOT;  Surgeon: Ash Gonzalez DPM;  Location:  OR      C LAPAROSCOPY, SURGICAL PROSTATECTOMY, RETROPUBIC RADICAL, W/NERVE SPARING  11/30/2004    With full bilateral pelvic lymphadenectomy.  F-[facility].     C TOTAL KNEE ARTHROPLASTY  05/01/08    Left knee     COLONOSCOPY  10/7/2013    Procedure: COLONOSCOPY;  Colonoscopy;  Surgeon: Mike Fallon MD;  Location: [facility] GI     EXTRACORPOREAL SHOCK WAVE LITHOTRIPSY (ESWL) Bilateral 10/18/2017    Procedure: EXTRACORPOREAL SHOCK WAVE LITHOTRIPSY (ESWL);  BILATERAL EXTRACORPOREAL SHOCKWAVE LITHOTRIPSY ;  Surgeon: Meir Torres MD;  Location: [facility] OR     HC CORRECT BUNION,SIMPLE  08/11/2005    x3     HC REMV TOE BENIGN BONE LESN  08/11/2005     HERNIORRHAPHY INGUINAL  7/3/2013    Procedure: HERNIORRHAPHY INGUINAL;  Open Repair Inguinal hernia Right with mesh ;  Surgeon: Sam Escobar MD;  Location: [facility] OR     MOHS MICROGRAPHIC PROCEDURE  08/23/11    ear and chin-[facility]     OPEN REDUCTION INTERNAL FIXATION WRIST Right 7/18/2017    Procedure: OPEN REDUCTION INTERNAL FIXATION WRIST;  Right distal radius open reduction and internal fixation;  Surgeon: Pedro Blanca DO;  Location: [facility] OR     RECONSTRUCT FOREFOOT WITH METATARSOPHALANGEAL (MTP) FUSION  6/10/2014    Procedure: RECONSTRUCT FOREFOOT WITH METATARSOPHALANGEAL (MTP) FUSION;  Surgeon: Ash Gonzalez DPM;  Location: [facility] OR     STENT, CORONARY, DEMI       SURGICAL HISTORY OF -   1999/1974    lt knee     SURGICAL HISTORY OF -   10/2004    lithotripsy     SURGICAL HISTORY OF - 11/05    angiogram with stent       FAMILY HISTORY:  Family History   Problem Relation Age of Onset     Hypertension Father         has had MI      Connective Tissue Disorder Mother         LUPUS     Heart Disease Mother         poor valve-needing replacement       SOCIAL HISTORY:  Social History     Socioeconomic History     Marital status:      Spouse name: Alessandra     Number of children: 2     Years of education: Not on file     Highest education level: Not on file    Occupational History     Not on file   Social Needs     Financial resource strain: Not on file     Food insecurity:     Worry: Not on file     Inability: Not on file     Transportation needs:     Medical: Not on file     Non-medical: Not on file   Tobacco Use     Smoking status: Never Smoker     Smokeless tobacco: Never Used   Substance and Sexual Activity     Alcohol use: Yes     Alcohol/week: 8.3 standard drinks     Types: 10 Cans of beer per week     Comment: occasional      Drug use: No     Sexual activity: Yes     Partners: Female   Lifestyle     Physical activity:     Days per week: Not on file     Minutes per session: Not on file     Stress: Not on file   Relationships     Social connections:     Talks on phone: Not on file     Gets together: Not on file     Attends Zoroastrianism service: Not on file     Active member of club or organization: Not on file     Attends meetings of clubs or organizations: Not on file     Relationship status: Not on file     Intimate partner violence:     Fear of current or ex partner: Not on file     Emotionally abused: Not on file     Physically abused: Not on file     Forced sexual activity: Not on file   Other Topics Concern      Service Not Asked     Blood Transfusions Not Asked     Caffeine Concern Not Asked     Occupational Exposure Not Asked     Hobby Hazards Not Asked     Sleep Concern Not Asked     Stress Concern Not Asked     Weight Concern Not Asked     Special Diet Not Asked     Back Care Not Asked     Exercise Not Asked     Bike Helmet Not Asked     Seat Belt Not Asked     Self-Exams Not Asked     Parent/sibling w/ CABG, MI or angioplasty before 65F 55M? Not Asked   Social History Narrative     Not on file       Review of Systems:  Skin:  Negative       Eyes:  Negative      ENT:  Negative postnasal drainage chronic  Respiratory:  Negative       Cardiovascular:  Negative for;edema;lightheadedness;dizziness;fatigue;palpitations;chest pain      Gastroenterology:  "Negative      Genitourinary:  Positive for kidney stone problem with kidney stones will f/u with Dr. Wang  Musculoskeletal:  Positive for joint pain right wrist had plate put in due to fall this summer   Neurologic:  Negative      Psychiatric:  Negative sleep disturbances;anxiety;depression worries  Heme/Lymph/Imm:  Positive for allergies    Endocrine:  Negative        Physical Exam:  Vitals: BP (!) 142/78 (BP Location: Right arm, Cuff Size: Adult Regular)   Pulse 74   Resp 12   Ht 1.74 m (5' 8.5\")   Wt 100.7 kg (222 lb)   SpO2 97%   BMI 33.26 kg/m       Constitutional:  cooperative, alert and oriented, well developed, well nourished, in no acute distress        Skin:  warm and dry to the touch, no apparent skin lesions or masses noted          Head:  normocephalic, no masses or lesions        Eyes:  pupils equal and round        Lymph:No Cervical lymphadenopathy present     ENT:  no pallor or cyanosis, dentition good        Neck:           Respiratory:  clear to auscultation;normal symmetry         Cardiac: regular rhythm;normal S1 and S2     no presence of murmur          pulses full and equal, no bruits auscultated                                   Groin site intact    GI:  abdomen soft;BS normoactive        Extremities and Muscular Skeletal:  no edema;no deformities, clubbing, cyanosis, erythema observed              Neurological:  no gross motor deficits;affect appropriate        Psych:  oriented to time, person and place        CC  No referring provider defined for this encounter.                Thank you for allowing me to participate in the care of your patient.      Sincerely,     AIDEN ALEXANDRE MD     [facility]    cc:   No referring provider defined for this encounter.        "

## 2019-11-12 NOTE — PROGRESS NOTES
Service Date: 11/12/2019      HISTORY OF PRESENT ILLNESS:  I had the opportunity to see Mr. Quan Murphy in Cardiology Clinic today at the [facility] in [place] for reevaluation of coronary artery disease and dyslipidemia.  Mr. Murphy is a 69-year-old gentleman who had symptoms of left shoulder and left arm discomfort in 12/2016 when he presented to [facility] in [place].  He was transferred to [facility] and found to have evidence of severe proximal LAD disease treated with angioplasty and stenting.  He had no evidence of infarction and his left ventricular function was normal.  He was started on metoprolol and lisinopril, but they were discontinued due to low blood pressures.  His cholesterol numbers have been well controlled on Crestor.      This year, he has been doing well.  He has not been having any recurrent chest, shoulder or left arm discomfort symptoms.  He has been able to tolerate a low dose of lisinopril 5 mg daily without lightheadedness.  His cholesterol numbers are excellent with an LDL down to 45.  His basic metabolic panel and ALT are normal.      PHYSICAL EXAMINATION:  His blood pressure was 142/78, heart rate 74 and weight 222 pounds.  He is up about 10 pounds since last year.  His lungs are clear.  His heart rhythm is regular.  There are no cardiac murmurs or carotid bruits.      IMPRESSIONS:  Mr. Quan Murphy is a 69-year-old gentleman with dyslipidemia and probably mild hypertension and a history of coronary artery disease with stenting of the proximal LAD in 2016.      I suggested that we try increasing his lisinopril to 10 mg a day to get better blood pressure control.  If he has lightheadedness, he will decrease that back down to 5 mg a day.  His wife is a nurse and she will take his blood pressure periodically and let us know if it continues to run high in which case we can increase his medications further.  Otherwise, I  will have him continue his other medications and plan on following up with me again in 1 year for reevaluation.      cc:   Jose Hernandez MD   [address]  [address]         AIDEN ALEXANDRE MD, [facility]             D: 2019   T: 2019   MT: ELANA      Name:     CHUCHO MONTOYA   MRN:      [id number]        Account:      [id number]   :      1950           Service Date: 2019      Document: [id number]

## 2019-11-17 ENCOUNTER — MYC REFILL (OUTPATIENT)
Dept: FAMILY MEDICINE | Facility: CLINIC | Age: 69
End: 2019-11-17

## 2019-11-17 DIAGNOSIS — J32.0 CHRONIC MAXILLARY SINUSITIS: ICD-10-CM

## 2019-11-17 DIAGNOSIS — S37.019A KIDNEY HEMATOMA, UNSPECIFIED LATERALITY, INITIAL ENCOUNTER: ICD-10-CM

## 2019-11-17 DIAGNOSIS — M12.9 ARTHROPATHY: ICD-10-CM

## 2019-11-18 NOTE — TELEPHONE ENCOUNTER
Norco 7.5-325 MG       Last Written Prescription Date:  10/23/19  Last Fill Quantity: 120,   # refills: 0  Last Office Visit: 3/15/19  Future Office visit:       Routing refill request to provider for review/approval because:  Drug not on the [facility], [facility] or [facility] refill protocol or controlled substance

## 2019-11-19 NOTE — TELEPHONE ENCOUNTER
Requested Prescriptions   Pending Prescriptions Disp Refills     ALPRAZolam (XANAX) 0.5 MG tablet 90 tablet 1     Sig: Take 1 tablet (0.5 mg) by mouth 3 times daily as needed for anxiety    There is no refill protocol information for this order   Last Written Prescription Date:  11/14/18  Last Fill Quantity: 90,  # refills: 1   Last office visit: 10/8/2018 with prescribing provider:     Future Office Visit:      Routing refill request to provider for review/approval because:  Drug not on the [facility] refill protocol            HYDROcodone-acetaminophen (NORCO) 7.5-325 MG per tablet 120 tablet 0     Sig: Take 1 tablet by mouth every 6 hours as needed for moderate to severe pain maximum 4 tablet(s) per day    There is no refill protocol information for this order      Last Written Prescription Date:  12/11/18  Last Fill Quantity: 120,  # refills: 0   Last office visit: 10/8/2018 with prescribing provider:     Future Office Visit:      Routing refill request to provider for review/approval because:  Drug not on the [facility] refill protocol     Renay Jackson RN             Former smoker

## 2019-11-20 RX ORDER — HYDROCODONE BITARTRATE AND ACETAMINOPHEN 7.5; 325 MG/1; MG/1
1 TABLET ORAL EVERY 6 HOURS PRN
Qty: 120 TABLET | Refills: 0 | Status: SHIPPED | OUTPATIENT
Start: 2019-11-20 | End: 2019-12-13

## 2019-11-29 ENCOUNTER — MYC MEDICAL ADVICE (OUTPATIENT)
Dept: FAMILY MEDICINE | Facility: CLINIC | Age: 69
End: 2019-11-29

## 2019-11-29 DIAGNOSIS — R21 RASH: Primary | ICD-10-CM

## 2019-11-29 RX ORDER — KETOCONAZOLE 20 MG/G
CREAM TOPICAL 2 TIMES DAILY
Qty: 60 G | Refills: 1 | Status: SHIPPED | OUTPATIENT
Start: 2019-11-29 | End: 2020-10-23

## 2019-12-05 ENCOUNTER — MYC REFILL (OUTPATIENT)
Dept: FAMILY MEDICINE | Facility: CLINIC | Age: 69
End: 2019-12-05

## 2019-12-05 DIAGNOSIS — M12.9 ARTHROPATHY: ICD-10-CM

## 2019-12-06 RX ORDER — HYDROXYZINE PAMOATE 25 MG/1
25 CAPSULE ORAL 3 TIMES DAILY PRN
Qty: 60 CAPSULE | Refills: 3 | Status: SHIPPED | OUTPATIENT
Start: 2019-12-06 | End: 2020-03-17

## 2019-12-06 NOTE — TELEPHONE ENCOUNTER
Routing refill request to provider for review/approval because:  Drug not on the [facility] refill protocol for this diagnosis    Anita Damon RN on 12/6/2019 at 1:06 PM

## 2019-12-06 NOTE — TELEPHONE ENCOUNTER
"Requested Prescriptions   Pending Prescriptions Disp Refills     hydrOXYzine (VISTARIL) 25 MG capsule 60 capsule 3     Sig: Take 1 capsule (25 mg) by mouth 3 times daily as needed for anxiety   Last Written Prescription Date:  9/25/19  Last Fill Quantity: 60,  # refills: 3   Last office visit: 3/15/19 David  Future Office Visit:        Antihistamines Protocol Passed - 12/5/2019  8:54 AM        Passed - Recent (12 mo) or future (30 days) visit within the authorizing provider's specialty     Patient has had an office visit with the authorizing provider or a provider within the authorizing providers department within the previous 12 mos or has a future within next 30 days. See \"Patient Info\" tab in inbasket, or \"Choose Columns\" in Meds & Orders section of the refill encounter.              Passed - Patient is age 3 or older     Apply age and/or weight-based dosing for peds patients age 3 and older.    Forward request to provider for patients under the age of 3.          Passed - Medication is active on med list          "

## 2019-12-19 ENCOUNTER — MYC MEDICAL ADVICE (OUTPATIENT)
Dept: FAMILY MEDICINE | Facility: CLINIC | Age: 69
End: 2019-12-19

## 2019-12-19 DIAGNOSIS — R21 RASH: Primary | ICD-10-CM

## 2019-12-19 RX ORDER — FLUCONAZOLE 100 MG/1
100 TABLET ORAL DAILY
Qty: 14 TABLET | Refills: 0 | Status: SHIPPED | OUTPATIENT
Start: 2019-12-19 | End: 2020-05-01

## 2020-01-12 ENCOUNTER — MYC REFILL (OUTPATIENT)
Dept: FAMILY MEDICINE | Facility: CLINIC | Age: 70
End: 2020-01-12

## 2020-01-12 DIAGNOSIS — S37.019A KIDNEY HEMATOMA, UNSPECIFIED LATERALITY, INITIAL ENCOUNTER: ICD-10-CM

## 2020-01-13 NOTE — TELEPHONE ENCOUNTER
Roxicodone 5 MG       Last Written Prescription Date:  11/1/17  Last Fill Quantity: 20,   # refills: 0  Last Office Visit: 3/15/19  Future Office visit:       Routing refill request to provider for review/approval because:  Drug not on the [facility], [facility] or [facility] refill protocol or controlled substance

## 2020-01-14 ENCOUNTER — MYC REFILL (OUTPATIENT)
Dept: FAMILY MEDICINE | Facility: CLINIC | Age: 70
End: 2020-01-14

## 2020-01-14 DIAGNOSIS — J32.0 CHRONIC MAXILLARY SINUSITIS: ICD-10-CM

## 2020-01-14 DIAGNOSIS — J01.00 ACUTE MAXILLARY SINUSITIS, RECURRENCE NOT SPECIFIED: Primary | ICD-10-CM

## 2020-01-14 DIAGNOSIS — M12.9 ARTHROPATHY: ICD-10-CM

## 2020-01-14 DIAGNOSIS — S37.019A KIDNEY HEMATOMA, UNSPECIFIED LATERALITY, INITIAL ENCOUNTER: ICD-10-CM

## 2020-01-14 RX ORDER — OXYCODONE HYDROCHLORIDE 5 MG/1
5 TABLET ORAL EVERY 4 HOURS PRN
Qty: 10 TABLET | Refills: 0 | Status: SHIPPED | OUTPATIENT
Start: 2020-01-14 | End: 2020-02-11

## 2020-01-14 RX ORDER — AZITHROMYCIN 250 MG/1
TABLET, FILM COATED ORAL
Qty: 6 TABLET | Refills: 0 | Status: SHIPPED | OUTPATIENT
Start: 2020-01-14 | End: 2020-03-08

## 2020-01-14 RX ORDER — HYDROCODONE BITARTRATE AND ACETAMINOPHEN 7.5; 325 MG/1; MG/1
1 TABLET ORAL EVERY 6 HOURS PRN
Qty: 120 TABLET | Refills: 0 | Status: SHIPPED | OUTPATIENT
Start: 2020-01-14 | End: 2020-02-11

## 2020-01-14 NOTE — TELEPHONE ENCOUNTER
Fighting first sinus infection of the year.  Coughing up a lot of junk too.  Wondering if you could prescribe an antibiotic for me also.     Norco      Last Written Prescription Date:  12/16/19  Last Fill Quantity: 120,   # refills: 0  Last Office Visit: 3/15/19  Future Office visit:       Routing refill request to provider for review/approval because:  Drug not on the [facility], [facility] or [facility] refill protocol or controlled substance

## 2020-01-23 ENCOUNTER — MYC REFILL (OUTPATIENT)
Dept: CARDIOLOGY | Facility: CLINIC | Age: 70
End: 2020-01-23

## 2020-01-23 DIAGNOSIS — E78.5 HYPERLIPIDEMIA LDL GOAL <130: ICD-10-CM

## 2020-01-24 RX ORDER — ROSUVASTATIN CALCIUM 40 MG/1
40 TABLET, COATED ORAL DAILY
Qty: 90 TABLET | Refills: 3 | Status: SHIPPED | OUTPATIENT
Start: 2020-01-24 | End: 2021-03-02

## 2020-02-10 DIAGNOSIS — F41.9 ANXIETY: ICD-10-CM

## 2020-02-11 ENCOUNTER — MYC REFILL (OUTPATIENT)
Dept: FAMILY MEDICINE | Facility: CLINIC | Age: 70
End: 2020-02-11

## 2020-02-11 ENCOUNTER — APPOINTMENT (OUTPATIENT)
Dept: GENERAL RADIOLOGY | Facility: CLINIC | Age: 70
End: 2020-02-11
Attending: EMERGENCY MEDICINE
Payer: MEDICARE

## 2020-02-11 ENCOUNTER — ANESTHESIA EVENT (OUTPATIENT)
Dept: GASTROENTEROLOGY | Facility: CLINIC | Age: 70
End: 2020-02-11
Payer: MEDICARE

## 2020-02-11 ENCOUNTER — HOSPITAL ENCOUNTER (EMERGENCY)
Facility: CLINIC | Age: 70
Discharge: HOME | End: 2020-02-11
Attending: EMERGENCY MEDICINE | Admitting: EMERGENCY MEDICINE
Payer: MEDICARE

## 2020-02-11 VITALS
WEIGHT: 223 LBS | SYSTOLIC BLOOD PRESSURE: 129 MMHG | OXYGEN SATURATION: 95 % | DIASTOLIC BLOOD PRESSURE: 89 MMHG | RESPIRATION RATE: 18 BRPM | HEART RATE: 66 BPM | HEIGHT: 68 IN | BODY MASS INDEX: 33.8 KG/M2 | TEMPERATURE: 99 F

## 2020-02-11 DIAGNOSIS — J32.0 CHRONIC MAXILLARY SINUSITIS: ICD-10-CM

## 2020-02-11 DIAGNOSIS — M12.9 ARTHROPATHY: ICD-10-CM

## 2020-02-11 DIAGNOSIS — K21.9 GASTROESOPHAGEAL REFLUX DISEASE, ESOPHAGITIS PRESENCE NOT SPECIFIED: ICD-10-CM

## 2020-02-11 DIAGNOSIS — K22.89 ESOPHAGEAL PAIN: ICD-10-CM

## 2020-02-11 DIAGNOSIS — S37.019A KIDNEY HEMATOMA, UNSPECIFIED LATERALITY, INITIAL ENCOUNTER: ICD-10-CM

## 2020-02-11 DIAGNOSIS — F41.9 ANXIETY: ICD-10-CM

## 2020-02-11 LAB
ALBUMIN SERPL-MCNC: 3.6 G/DL (ref 3.4–5)
ALP SERPL-CCNC: 82 U/L (ref 40–150)
ALT SERPL W P-5'-P-CCNC: 30 U/L (ref 0–70)
ANION GAP SERPL CALCULATED.3IONS-SCNC: 8 MMOL/L (ref 3–14)
AST SERPL W P-5'-P-CCNC: 29 U/L (ref 0–45)
BASOPHILS # BLD AUTO: 0 10E9/L (ref 0–0.2)
BASOPHILS NFR BLD AUTO: 0.2 %
BILIRUB SERPL-MCNC: 0.8 MG/DL (ref 0.2–1.3)
BUN SERPL-MCNC: 21 MG/DL (ref 7–30)
CALCIUM SERPL-MCNC: 8.3 MG/DL (ref 8.5–10.1)
CHLORIDE SERPL-SCNC: 109 MMOL/L (ref 94–109)
CO2 SERPL-SCNC: 22 MMOL/L (ref 20–32)
CREAT SERPL-MCNC: 1.09 MG/DL (ref 0.66–1.25)
CRP SERPL-MCNC: 8.6 MG/L (ref 0–8)
DEPRECATED S PYO AG THROAT QL EIA: NORMAL
DIFFERENTIAL METHOD BLD: ABNORMAL
EOSINOPHIL NFR BLD AUTO: 0.7 %
ERYTHROCYTE [DISTWIDTH] IN BLOOD BY AUTOMATED COUNT: 14.6 % (ref 10–15)
ERYTHROCYTE [SEDIMENTATION RATE] IN BLOOD BY WESTERGREN METHOD: 8 MM/H (ref 0–20)
GFR SERPL CREATININE-BSD FRML MDRD: 69 ML/MIN/{1.73_M2}
GLUCOSE SERPL-MCNC: 91 MG/DL (ref 70–99)
HCT VFR BLD AUTO: 36.8 % (ref 40–53)
HGB BLD-MCNC: 12.1 G/DL (ref 13.3–17.7)
IMM GRANULOCYTES # BLD: 0 10E9/L (ref 0–0.4)
IMM GRANULOCYTES NFR BLD: 0.5 %
LIPASE SERPL-CCNC: 99 U/L (ref 73–393)
LYMPHOCYTES # BLD AUTO: 0.8 10E9/L (ref 0.8–5.3)
LYMPHOCYTES NFR BLD AUTO: 9.3 %
MCH RBC QN AUTO: 27.9 PG (ref 26.5–33)
MCHC RBC AUTO-ENTMCNC: 32.9 G/DL (ref 31.5–36.5)
MCV RBC AUTO: 85 FL (ref 78–100)
MONOCYTES # BLD AUTO: 0.7 10E9/L (ref 0–1.3)
MONOCYTES NFR BLD AUTO: 8.2 %
NEUTROPHILS # BLD AUTO: 6.5 10E9/L (ref 1.6–8.3)
NEUTROPHILS NFR BLD AUTO: 81.1 %
NRBC # BLD AUTO: 0 10*3/UL
NRBC BLD AUTO-RTO: 0 /100
PLATELET # BLD AUTO: 149 10E9/L (ref 150–450)
POTASSIUM SERPL-SCNC: 4.4 MMOL/L (ref 3.4–5.3)
PROT SERPL-MCNC: 6.6 G/DL (ref 6.8–8.8)
RBC # BLD AUTO: 4.34 10E12/L (ref 4.4–5.9)
SODIUM SERPL-SCNC: 139 MMOL/L (ref 133–144)
SPECIMEN SOURCE: NORMAL
WBC # BLD AUTO: 8 10E9/L (ref 4–11)

## 2020-02-11 PROCEDURE — 80053 COMPREHEN METABOLIC PANEL: CPT | Performed by: EMERGENCY MEDICINE

## 2020-02-11 PROCEDURE — 71046 X-RAY EXAM CHEST 2 VIEWS: CPT | Mod: TC

## 2020-02-11 PROCEDURE — 85652 RBC SED RATE AUTOMATED: CPT | Performed by: EMERGENCY MEDICINE

## 2020-02-11 PROCEDURE — 25000132 ZZH RX MED GY IP 250 OP 250 PS 637: Mod: GY | Performed by: EMERGENCY MEDICINE

## 2020-02-11 PROCEDURE — 87880 STREP A ASSAY W/OPTIC: CPT | Performed by: EMERGENCY MEDICINE

## 2020-02-11 PROCEDURE — 87081 CULTURE SCREEN ONLY: CPT | Performed by: EMERGENCY MEDICINE

## 2020-02-11 PROCEDURE — 86140 C-REACTIVE PROTEIN: CPT | Performed by: EMERGENCY MEDICINE

## 2020-02-11 PROCEDURE — 83690 ASSAY OF LIPASE: CPT | Performed by: EMERGENCY MEDICINE

## 2020-02-11 PROCEDURE — 25000125 ZZHC RX 250: Performed by: EMERGENCY MEDICINE

## 2020-02-11 PROCEDURE — 99284 EMERGENCY DEPT VISIT MOD MDM: CPT | Mod: Z6 | Performed by: EMERGENCY MEDICINE

## 2020-02-11 PROCEDURE — 99284 EMERGENCY DEPT VISIT MOD MDM: CPT | Mod: 25 | Performed by: EMERGENCY MEDICINE

## 2020-02-11 PROCEDURE — 85025 COMPLETE CBC W/AUTO DIFF WBC: CPT | Performed by: EMERGENCY MEDICINE

## 2020-02-11 RX ORDER — ALPRAZOLAM 0.5 MG
TABLET ORAL
Qty: 90 TABLET | Refills: 1 | Status: SHIPPED | OUTPATIENT
Start: 2020-02-13 | End: 2020-04-08

## 2020-02-11 RX ORDER — SUCRALFATE ORAL 1 G/10ML
1 SUSPENSION ORAL 4 TIMES DAILY
Qty: 420 ML | Refills: 0 | Status: SHIPPED | OUTPATIENT
Start: 2020-02-11 | End: 2020-07-15

## 2020-02-11 RX ORDER — HYDROCODONE BITARTRATE AND ACETAMINOPHEN 7.5; 325 MG/1; MG/1
1 TABLET ORAL EVERY 6 HOURS PRN
Qty: 120 TABLET | Refills: 0 | Status: SHIPPED | OUTPATIENT
Start: 2020-02-11 | End: 2020-03-15

## 2020-02-11 RX ORDER — OXYCODONE HYDROCHLORIDE 5 MG/1
5 TABLET ORAL EVERY 6 HOURS PRN
Qty: 12 TABLET | Refills: 0 | Status: SHIPPED | OUTPATIENT
Start: 2020-02-11 | End: 2021-05-12

## 2020-02-11 RX ORDER — ALPRAZOLAM 0.5 MG
0.5 TABLET ORAL 3 TIMES DAILY PRN
Qty: 90 TABLET | Refills: 1 | OUTPATIENT
Start: 2020-02-11

## 2020-02-11 RX ADMIN — LIDOCAINE HYDROCHLORIDE 30 ML: 20 SOLUTION ORAL; TOPICAL at 11:10

## 2020-02-11 ASSESSMENT — MIFFLIN-ST. JEOR: SCORE: 1756.02

## 2020-02-11 NOTE — TELEPHONE ENCOUNTER
Alprazolam 0.5 MG       Last Written Prescription Date:  12/18/19  Last Fill Quantity: 90,   # refills: 1  Last Office Visit: 3/15/19  Future Office visit:       Routing refill request to provider for review/approval because:  Drug not on the [facility], [facility] or [facility] refill protocol or controlled substance

## 2020-02-11 NOTE — ED AVS SNAPSHOT
[facility]  [address] [facility] [address]   [place] [address]  Phone:  [phone]  Fax:  [phone]                                    Quan Murphy   MRN: [id number]    Department:  [facility] Emergency Department   Date of Visit:  2/11/2020           After Visit Summary Signature Page    I have received my discharge instructions, and my questions have been answered. I have discussed any challenges I see with this plan with the nurse or doctor.    ..........................................................................................................................................  Patient/Patient Representative Signature      ..........................................................................................................................................  Patient Representative Print Name and Relationship to Patient    ..................................................               ................................................  Date                                   Time    ..........................................................................................................................................  Reviewed by Signature/Title    ...................................................              ..............................................  Date                                               Time          22EPIC Rev 08/18

## 2020-02-11 NOTE — DISCHARGE INSTRUCTIONS
Follow the instructions for your endoscopy tomorrow.    Afterwards, you can continue your omeprazole and try Carafate up to 4 times daily as needed for pain/discomfort.    I will send a message to Dr. Hernandez about your ED visit and endoscopy so you can have good follow-up.  Return at anytime for worsening, changes or concerns.    I hope that you start to improve quickly!!

## 2020-02-11 NOTE — H&P
[facility], [place] History and Physical    Quan Murphy MRN# [id number]   Age: 69 year old YOB: 1950     Date of Admission:  (Not on file)    Home clinic: [facility]  Primary care provider: Jose Hernandez          Impression and Plan:   Impression:   Esophageal pain [K22.8]  Gastroesophageal reflux disease, esophagitis presence not specified [K21.9]  Last EGD 2013      Plan:   Proceed to EGD as planned.  The procedure, risks(bleeding, perforation), benefits and alternatives were discussed and the patient agrees to proceed. Cleared for Anesthesia             Chief Complaint:   Esophageal pain [K22.8]  Gastroesophageal reflux disease, esophagitis presence not specified [K21.9]    History is obtained from the patient          History of Present Illness:   This 69 year old male is being seen at this time for evaluation for EGD.  Developed a sore throat 1 week ago.  It then progressed to dysphagia.  Seen in ER yesterday.  Scheduled for EGD today.  No other complaints           Past Medical History:     Past Medical History:   Diagnosis Date     Allergy, unspecified not elsewhere classified     Seasonal allergies, pollen, dust, smoke and animals     Antiplatelet or antithrombotic long-term use      Anxiety      Arthritis      Chest pain      Chronic sinusitis      Coronary atherosclerosis of unspecified type of vessel, native or graft     Coronary artery disease     Hyperlipidemia      Hypertension      Inguinal hernia      Kidney stones      Malignant neoplasm of prostate (H)     Prostate cancer     Prostate cancer (H)             Past Surgical History:     Past Surgical History:   Procedure Laterality Date     ARTHRODESIS FOOT  7/23/2013    Procedure: ARTHRODESIS FOOT;  Great Toe Arthrodesis Left Foot;  Surgeon: Ash Gonzalez DPM;  [facility]: [facility]     ARTHRODESIS FOOT  6/10/2014    Procedure: ARTHRODESIS FOOT;  Surgeon: Ash Gonzalez DPM;  Location:  OR     C  LAPAROSCOPY, SURGICAL PROSTATECTOMY, RETROPUBIC RADICAL, W/NERVE SPARING  11/30/2004    With full bilateral pelvic lymphadenectomy.  F[facility].     C TOTAL KNEE ARTHROPLASTY  05/01/08    Left knee     COLONOSCOPY  10/7/2013    Procedure: COLONOSCOPY;  Colonoscopy;  Surgeon: Mike Fallon MD;  Location: [facility]     EXTRACORPOREAL SHOCK WAVE LITHOTRIPSY (ESWL) Bilateral 10/18/2017    Procedure: EXTRACORPOREAL SHOCK WAVE LITHOTRIPSY (ESWL);  BILATERAL EXTRACORPOREAL SHOCKWAVE LITHOTRIPSY ;  Surgeon: Meir Torres MD;  Location: [facility] OR     HC CORRECT BUNION,SIMPLE  08/11/2005    x3     HC REMV TOE BENIGN BONE LESN  08/11/2005     HERNIORRHAPHY INGUINAL  7/3/2013    Procedure: HERNIORRHAPHY INGUINAL;  Open Repair Inguinal hernia Right with mesh ;  Surgeon: Sam Escobar MD;  Location: [facility] OR     MOHS MICROGRAPHIC PROCEDURE  08/23/11    ear and chin-[facility]     OPEN REDUCTION INTERNAL FIXATION WRIST Right 7/18/2017    Procedure: OPEN REDUCTION INTERNAL FIXATION WRIST;  Right distal radius open reduction and internal fixation;  Surgeon: Pedro Blanca DO;  Location: [facility]     RECONSTRUCT FOREFOOT WITH METATARSOPHALANGEAL (MTP) FUSION  6/10/2014    Procedure: RECONSTRUCT FOREFOOT WITH METATARSOPHALANGEAL (MTP) FUSION;  Surgeon: Ash Gonzalez DPM;  Location: [facility]     STENT, CORONARY, DEMI       SURGICAL HISTORY OF -   1999/1974    lt knee     SURGICAL HISTORY OF -   10/2004    lithotripsy     SURGICAL HISTORY OF - 11/05    angiogram with stent            Social History:     Social History     Tobacco Use     Smoking status: Never Smoker     Smokeless tobacco: Never Used   Substance Use Topics     Alcohol use: Yes     Alcohol/week: 8.3 standard drinks     Types: 10 Cans of beer per week     Comment: occasional             Family History:     Family History   Problem Relation Age of Onset     Hypertension Father         has had MI      Connective Tissue Disorder Mother         LUPUS      Heart Disease Mother         poor valve-needing replacement            Immunizations:     VACCINE/DOSE   Diptheria   DPT   DTAP   HBIG   Hepatitis A   Hepatitis B   HIB   Influenza   Measles   Meningococcal   MMR   Mumps   Pneumococcal   Polio   Rubella   Small Pox   TDAP   Varicella   Zoster            Allergies:     Allergies   Allergen Reactions     Animal Dander      Azithromycin Nausea and Vomiting     Dust Mites      Pollen Extract      Smoke.             Medications:     No current facility-administered medications for this encounter.      Current Outpatient Medications   Medication Sig     ASPIRIN ADULT LOW STRENGTH 81 MG EC tablet TAKE ONE TABLET BY MOUTH ONCE DAILY     [START ON 2/13/2020] ALPRAZolam (XANAX) 0.5 MG tablet TAKE ONE TABLET BY MOUTH THREE TIMES A DAY AS NEEDED FOR ANXIETY     azithromycin (ZITHROMAX) 250 MG tablet Two tablets first day, then one tablet daily for four days.     cetirizine (ZYRTEC) 10 MG tablet Take 10 mg by mouth daily     CIALIS 20 MG tablet Take 20 mg by mouth daily as needed      citalopram (CELEXA) 40 MG tablet Take 1 tablet (40 mg) by mouth daily     HYDROcodone-acetaminophen (NORCO) 7.5-325 MG per tablet Take 1 tablet by mouth every 6 hours as needed for moderate to severe pain maximum 4 tablet(s) per day     hydrOXYzine (VISTARIL) 25 MG capsule Take 1 capsule (25 mg) by mouth 3 times daily as needed for anxiety     ketoconazole (NIZORAL) 2 % external cream Apply topically 2 times daily     lisinopril (PRINIVIL/ZESTRIL) 10 MG tablet Take 1 tablet (10 mg) by mouth daily     neomycin-polymyxin-hydrocortisone (CORTISPORIN) 3.5-18193-0 otic suspension Place 4 drops Into the left ear 4 times daily (Patient not taking: Reported on 11/6/2018)     nitroglycerin (NITROSTAT) 0.4 MG sublingual tablet For chest pain place 1 tablet under the tongue every 5 minutes for 3 doses. If symptoms persist 5 minutes after 1st dose call 911. (Patient not taking: Reported on 11/12/2019)      olopatadine (PATANOL) 0.1 % ophthalmic solution Place 1 drop into both eyes 2 times daily (Patient not taking: Reported on 3/15/2019)     omeprazole (PRILOSEC) 40 MG DR capsule Take 1 capsule (40 mg) by mouth daily     oxyCODONE (ROXICODONE) 5 MG tablet Take 1 tablet (5 mg) by mouth every 6 hours as needed for pain     rosuvastatin (CRESTOR) 40 MG tablet Take 1 tablet (40 mg) by mouth daily     sucralfate (CARAFATE) 1 GM/10ML suspension Take 10 mLs (1 g) by mouth 4 times daily     zolpidem (AMBIEN) 10 MG tablet Take 1 tablet (10 mg) by mouth nightly as needed for sleep             Review of Systems:   The review of systems was positive for the following findings.  dysphagia.  The remainder of the review of systems was unremarkable.          Physical Exam:   All vitals have been reviewed  There were no vitals taken for this visit.  No intake or output data in the 24 hours ending 02/11/20 1613  SHEENT examination revealed NC/AT, EOMI  Examination of the chest revealed CTA.  Examination of the heart revealed S1. S2. (-)m/r/g.  Examination of the abdomen revealed soft, non tender, non disten ded  The neuromuscular examination was WNL          Data:   All laboratory data reviewed  Results for orders placed or performed during the hospital encounter of 02/11/20   XR Chest 2 Views     Status: None (Preliminary result)    Narrative    CHEST TWO VIEWS   2/11/2020 11:17 AM     HISTORY: Chest pain - throat/esophagus/epigastric.    COMPARISON: Chest x-ray dated 12/23/2016 and 3/23/2012.    FINDINGS:  Broad-based dextroconvex curvature of the mid to lower  thoracic spine is similar in appearance to the prior study dated  12/23/2016 and may be slightly more prominent than on the prior study  dated 3/23/2012. Lungs are grossly clear. Heart size, mediastinum and  pulmonary vascularity are within normal limits. No pneumothorax,  significant pleural fluid collection, or acute fracture is identified.  Amorphous calcification projected  over the cephalad aspect of the  right humeral head could represent an ossified intra-articular right  shoulder free body. It could also represent calcific tendinitis.  Spondylotic spurring is seen in the spine. No fracture.       Impression    IMPRESSION:   1. No evidence of acute cardiopulmonary disease is seen.   2. Dextroconvex lateral curvature of the thoracic spine is again  noted.  3. Question ossified intra-articular free body in the right shoulder  versus possible calcific tendinitis or summation artifact. Recommend  clinical correlation.  5. No acute fracture, pneumothorax, pleural fluid collection,  pneumonia, or congestive heart failure to suggest etiology for  patient's chest pain.   CBC with platelets differential     Status: Abnormal   Result Value Ref Range    WBC 8.0 4.0 - 11.0 10e9/L    RBC Count 4.34 (L) 4.4 - 5.9 10e12/L    Hemoglobin 12.1 (L) 13.3 - 17.7 g/dL    Hematocrit 36.8 (L) 40.0 - 53.0 %    MCV 85 78 - 100 fl    MCH 27.9 26.5 - 33.0 pg    MCHC 32.9 31.5 - 36.5 g/dL    RDW 14.6 10.0 - 15.0 %    Platelet Count 149 (L) 150 - 450 10e9/L    Diff Method Automated Method     % Neutrophils 81.1 %    % Lymphocytes 9.3 %    % Monocytes 8.2 %    % Eosinophils 0.7 %    % Basophils 0.2 %    % Immature Granulocytes 0.5 %    Nucleated RBCs 0 0 /100    Absolute Neutrophil 6.5 1.6 - 8.3 10e9/L    Absolute Lymphocytes 0.8 0.8 - 5.3 10e9/L    Absolute Monocytes 0.7 0.0 - 1.3 10e9/L    Absolute Basophils 0.0 0.0 - 0.2 10e9/L    Abs Immature Granulocytes 0.0 0 - 0.4 10e9/L    Absolute Nucleated RBC 0.0    Erythrocyte sedimentation rate auto     Status: None   Result Value Ref Range    Sed Rate 8 0 - 20 mm/h   CRP inflammation     Status: Abnormal   Result Value Ref Range    CRP Inflammation 8.6 (H) 0.0 - 8.0 mg/L   Comprehensive metabolic panel     Status: Abnormal   Result Value Ref Range    Sodium 139 133 - 144 mmol/L    Potassium 4.4 3.4 - 5.3 mmol/L    Chloride 109 94 - 109 mmol/L    Carbon Dioxide 22 20  - 32 mmol/L    Anion Gap 8 3 - 14 mmol/L    Glucose 91 70 - 99 mg/dL    Urea Nitrogen 21 7 - 30 mg/dL    Creatinine 1.09 0.66 - 1.25 mg/dL    GFR Estimate 69 >60 mL/min/[1.73_m2]    GFR Estimate If Black 80 >60 mL/min/[1.73_m2]    Calcium 8.3 (L) 8.5 - 10.1 mg/dL    Bilirubin Total 0.8 0.2 - 1.3 mg/dL    Albumin 3.6 3.4 - 5.0 g/dL    Protein Total 6.6 (L) 6.8 - 8.8 g/dL    Alkaline Phosphatase 82 40 - 150 U/L    ALT 30 0 - 70 U/L    AST 29 0 - 45 U/L   Lipase     Status: None   Result Value Ref Range    Lipase 99 73 - 393 U/L   Rapid strep screen     Status: None   Result Value Ref Range    Specimen Description Throat     Rapid Strep A Screen       NEGATIVE: No Group A streptococcal antigen detected by immunoassay, await culture report.     -     Sam Escobar MD, FACS

## 2020-02-11 NOTE — TELEPHONE ENCOUNTER
Alprazolam 0.5 MG       Last Written Prescription Date:  12/18/19  Last Fill Quantity: 90,   # refills: 1  Last Office Visit: 3/15/19  Future Office visit:       Routing refill request to provider for review/approval because:  Drug not on the [facility], [facility] or [facility] refill protocol or controlled substance  Norco 73-. MG       Last Written Prescription Date:  1/14/2020  Last Fill Quantity: 120,   # refills: 0  Last Office Visit: 3/15/19  Future Office visit:       Routing refill request to provider for review/approval because:  Drug not on the [facility], [facility] or [facility] refill protocol or controlled substance

## 2020-02-11 NOTE — ED TRIAGE NOTES
He has had a sore throat since Sunday and some cough.  His throat pain radiates to his stomach when he swallows.

## 2020-02-12 ENCOUNTER — HOSPITAL ENCOUNTER (OUTPATIENT)
Facility: CLINIC | Age: 70
Discharge: HOME | End: 2020-02-12
Attending: SPECIALIST | Admitting: SPECIALIST
Payer: MEDICARE

## 2020-02-12 ENCOUNTER — ANESTHESIA (OUTPATIENT)
Dept: GASTROENTEROLOGY | Facility: CLINIC | Age: 70
End: 2020-02-12
Payer: MEDICARE

## 2020-02-12 ENCOUNTER — TELEPHONE (OUTPATIENT)
Dept: FAMILY MEDICINE | Facility: CLINIC | Age: 70
End: 2020-02-12

## 2020-02-12 VITALS — DIASTOLIC BLOOD PRESSURE: 76 MMHG | SYSTOLIC BLOOD PRESSURE: 147 MMHG | OXYGEN SATURATION: 92 % | HEART RATE: 78 BPM

## 2020-02-12 DIAGNOSIS — B37.81 CANDIDA INFECTION, ESOPHAGEAL (H): Primary | ICD-10-CM

## 2020-02-12 LAB — UPPER GI ENDOSCOPY: NORMAL

## 2020-02-12 PROCEDURE — 88342 IMHCHEM/IMCYTCHM 1ST ANTB: CPT | Mod: 26 | Performed by: SPECIALIST

## 2020-02-12 PROCEDURE — 25000125 ZZHC RX 250: Performed by: NURSE ANESTHETIST, CERTIFIED REGISTERED

## 2020-02-12 PROCEDURE — 88305 TISSUE EXAM BY PATHOLOGIST: CPT | Performed by: SPECIALIST

## 2020-02-12 PROCEDURE — 88312 SPECIAL STAINS GROUP 1: CPT | Mod: 26 | Performed by: SPECIALIST

## 2020-02-12 PROCEDURE — 43235 EGD DIAGNOSTIC BRUSH WASH: CPT | Performed by: SPECIALIST

## 2020-02-12 PROCEDURE — 88305 TISSUE EXAM BY PATHOLOGIST: CPT | Mod: 26 | Performed by: SPECIALIST

## 2020-02-12 PROCEDURE — 43239 EGD BIOPSY SINGLE/MULTIPLE: CPT | Performed by: SPECIALIST

## 2020-02-12 PROCEDURE — 88312 SPECIAL STAINS GROUP 1: CPT | Performed by: SPECIALIST

## 2020-02-12 PROCEDURE — 25000128 H RX IP 250 OP 636: Performed by: NURSE ANESTHETIST, CERTIFIED REGISTERED

## 2020-02-12 PROCEDURE — 25800030 ZZH RX IP 258 OP 636: Performed by: NURSE ANESTHETIST, CERTIFIED REGISTERED

## 2020-02-12 PROCEDURE — 40000296 ZZH STATISTIC ENDO RECOVERY CLASS 1:2 FIRST HOUR: Performed by: SPECIALIST

## 2020-02-12 PROCEDURE — 88342 IMHCHEM/IMCYTCHM 1ST ANTB: CPT | Performed by: SPECIALIST

## 2020-02-12 PROCEDURE — 25000125 ZZHC RX 250: Performed by: SPECIALIST

## 2020-02-12 PROCEDURE — 37000008 ZZH ANESTHESIA TECHNICAL FEE, 1ST 30 MIN: Performed by: SPECIALIST

## 2020-02-12 RX ORDER — NALOXONE HYDROCHLORIDE 0.4 MG/ML
.1-.4 INJECTION, SOLUTION INTRAMUSCULAR; INTRAVENOUS; SUBCUTANEOUS
Status: DISCONTINUED | OUTPATIENT
Start: 2020-02-12 | End: 2020-02-12 | Stop reason: HOSPADM

## 2020-02-12 RX ORDER — ONDANSETRON 2 MG/ML
4 INJECTION INTRAMUSCULAR; INTRAVENOUS EVERY 30 MIN PRN
Status: DISCONTINUED | OUTPATIENT
Start: 2020-02-12 | End: 2020-02-12 | Stop reason: HOSPADM

## 2020-02-12 RX ORDER — ONDANSETRON 4 MG/1
4 TABLET, ORALLY DISINTEGRATING ORAL EVERY 30 MIN PRN
Status: DISCONTINUED | OUTPATIENT
Start: 2020-02-12 | End: 2020-02-12 | Stop reason: HOSPADM

## 2020-02-12 RX ORDER — SODIUM CHLORIDE, SODIUM LACTATE, POTASSIUM CHLORIDE, CALCIUM CHLORIDE 600; 310; 30; 20 MG/100ML; MG/100ML; MG/100ML; MG/100ML
INJECTION, SOLUTION INTRAVENOUS CONTINUOUS
Status: DISCONTINUED | OUTPATIENT
Start: 2020-02-12 | End: 2020-02-12 | Stop reason: HOSPADM

## 2020-02-12 RX ORDER — LIDOCAINE HYDROCHLORIDE 20 MG/ML
INJECTION, SOLUTION INFILTRATION; PERINEURAL PRN
Status: DISCONTINUED | OUTPATIENT
Start: 2020-02-12 | End: 2020-02-12

## 2020-02-12 RX ORDER — LIDOCAINE 40 MG/G
CREAM TOPICAL
Status: DISCONTINUED | OUTPATIENT
Start: 2020-02-12 | End: 2020-02-12 | Stop reason: HOSPADM

## 2020-02-12 RX ORDER — FLUMAZENIL 0.1 MG/ML
0.2 INJECTION, SOLUTION INTRAVENOUS
Status: DISCONTINUED | OUTPATIENT
Start: 2020-02-12 | End: 2020-02-12 | Stop reason: HOSPADM

## 2020-02-12 RX ORDER — NYSTATIN 100000/ML
500000 SUSPENSION, ORAL (FINAL DOSE FORM) ORAL 4 TIMES DAILY
Qty: 60 ML | Refills: 3 | Status: SHIPPED | OUTPATIENT
Start: 2020-02-12 | End: 2020-07-15

## 2020-02-12 RX ORDER — FENTANYL CITRATE 50 UG/ML
INJECTION, SOLUTION INTRAMUSCULAR; INTRAVENOUS PRN
Status: DISCONTINUED | OUTPATIENT
Start: 2020-02-12 | End: 2020-02-12

## 2020-02-12 RX ORDER — PROPOFOL 10 MG/ML
INJECTION, EMULSION INTRAVENOUS PRN
Status: DISCONTINUED | OUTPATIENT
Start: 2020-02-12 | End: 2020-02-12

## 2020-02-12 RX ORDER — GLYCOPYRROLATE 0.2 MG/ML
INJECTION, SOLUTION INTRAMUSCULAR; INTRAVENOUS PRN
Status: DISCONTINUED | OUTPATIENT
Start: 2020-02-12 | End: 2020-02-12

## 2020-02-12 RX ADMIN — PROPOFOL 20 MG: 10 INJECTION, EMULSION INTRAVENOUS at 08:49

## 2020-02-12 RX ADMIN — FENTANYL CITRATE 50 MCG: 50 INJECTION, SOLUTION INTRAMUSCULAR; INTRAVENOUS at 08:37

## 2020-02-12 RX ADMIN — PROPOFOL 10 MG: 10 INJECTION, EMULSION INTRAVENOUS at 08:51

## 2020-02-12 RX ADMIN — LIDOCAINE HYDROCHLORIDE 1 ML: 10 INJECTION, SOLUTION EPIDURAL; INFILTRATION; INTRACAUDAL; PERINEURAL at 07:52

## 2020-02-12 RX ADMIN — PROPOFOL 50 MG: 10 INJECTION, EMULSION INTRAVENOUS at 08:43

## 2020-02-12 RX ADMIN — SODIUM CHLORIDE, POTASSIUM CHLORIDE, SODIUM LACTATE AND CALCIUM CHLORIDE: 600; 310; 30; 20 INJECTION, SOLUTION INTRAVENOUS at 07:52

## 2020-02-12 RX ADMIN — PROPOFOL 50 MG: 10 INJECTION, EMULSION INTRAVENOUS at 08:44

## 2020-02-12 RX ADMIN — GLYCOPYRROLATE 0.1 MG: 0.2 INJECTION, SOLUTION INTRAMUSCULAR; INTRAVENOUS at 08:37

## 2020-02-12 RX ADMIN — LIDOCAINE HYDROCHLORIDE 100 MG: 20 INJECTION, SOLUTION INFILTRATION; PERINEURAL at 08:43

## 2020-02-12 NOTE — ANESTHESIA PREPROCEDURE EVALUATION
Anesthesia Pre-Procedure Evaluation    Patient: Quan Murphy   MRN: [id number] : 1950          Preoperative Diagnosis: Esophageal pain [K22.8]  Gastroesophageal reflux disease, esophagitis presence not specified [K21.9]    Procedure(s):  ESOPHAGOGASTRODUODENOSCOPY (EGD)    Past Medical History:   Diagnosis Date     Allergy, unspecified not elsewhere classified     Seasonal allergies, pollen, dust, smoke and animals     Antiplatelet or antithrombotic long-term use      Anxiety      Arthritis      Chest pain      Chronic sinusitis      Coronary atherosclerosis of unspecified type of vessel, native or graft     Coronary artery disease     Hyperlipidemia      Hypertension      Inguinal hernia      Kidney stones      Malignant neoplasm of prostate (H)     Prostate cancer     Prostate cancer (H)      Past Surgical History:   Procedure Laterality Date     ARTHRODESIS FOOT  2013    Procedure: ARTHRODESIS FOOT;  Great Toe Arthrodesis Left Foot;  Surgeon: Ash Gonzalez DPM;  [facility]     ARTHRODESIS FOOT  6/10/2014    Procedure: ARTHRODESIS FOOT;  Surgeon: Ash Gonzalez DPM;  Location: [facility]     C LAPAROSCOPY, SURGICAL PROSTATECTOMY, RETROPUBIC RADICAL, W/NERVE SPARING  2004    With full bilateral pelvic lymphadenectomy.  [facility].     C TOTAL KNEE ARTHROPLASTY  08    Left knee     COLONOSCOPY  10/7/2013    Procedure: COLONOSCOPY;  Colonoscopy;  Surgeon: Mike Fallon MD;  [facility]     EXTRACORPOREAL SHOCK WAVE LITHOTRIPSY (ESWL) Bilateral 10/18/2017    Procedure: EXTRACORPOREAL SHOCK WAVE LITHOTRIPSY (ESWL);  BILATERAL EXTRACORPOREAL SHOCKWAVE LITHOTRIPSY ;  Surgeon: Meir Torres MD;  Location: [facility] OR     HC CORRECT BUNION,SIMPLE  08/11/2005    x3     HC REMV TOE BENIGN BONE LESN  2005     HERNIORRHAPHY INGUINAL  7/3/2013    Procedure: HERNIORRHAPHY INGUINAL;  Open Repair Inguinal hernia Right with mesh ;  Surgeon: Sam Escobar MD;  Location: [facility]      MOHS MICROGRAPHIC PROCEDURE  08/23/11    ear and chin-[facility]     OPEN REDUCTION INTERNAL FIXATION WRIST Right 7/18/2017    Procedure: OPEN REDUCTION INTERNAL FIXATION WRIST;  Right distal radius open reduction and internal fixation;  Surgeon: Pedro Blanca DO;  [facility]: [facility]     RECONSTRUCT FOREFOOT WITH METATARSOPHALANGEAL (MTP) FUSION  6/10/2014    Procedure: RECONSTRUCT FOREFOOT WITH METATARSOPHALANGEAL (MTP) FUSION;  Surgeon: Ash Gonzalez DPM;  Location: [facility]     STENT, CORONARY, DEMI       SURGICAL HISTORY OF -   1999/1974    lt knee     SURGICAL HISTORY OF -   10/2004    lithotripsy     SURGICAL HISTORY OF -   11/05    angiogram with stent       Anesthesia Evaluation     . Pt has had prior anesthetic. Type: General and MAC    No history of anesthetic complications          ROS/MED HX    ENT/Pulmonary:  - neg pulmonary ROS   (+)HERMES risk factors hypertension, , . .    Neurologic:  - neg neurologic ROS     Cardiovascular:     (+) Dyslipidemia, hypertension--CAD, --stent,01/05/2017  Drug Eluting Stent .. Taking blood thinners Pt has not received instructions: Instructions Given to patient: taking ASA. . fainting (syncope) (2017). :. . Previous cardiac testing date:results:date: results:ECG reviewed date:07/17/2017-2-12-20 results:NSR  2/12/20- SR date: results:          METS/Exercise Tolerance:     Hematologic:  - neg hematologic  ROS       Musculoskeletal:   (+) arthritis,  -       GI/Hepatic: Comment: Sore throat currently    (+) GERD Asymptomatic on medication, Other GI/Hepatic 8.3 standard drinks per week      Renal/Genitourinary:     (+) Nephrolithiasis , Pt has no history of transplant,       Endo:  - neg endo ROS       Psychiatric:     (+) psychiatric history anxiety      Infectious Disease:  - neg infectious disease ROS       Malignancy:      - no malignancy   Other:    - neg other ROS                      Physical Exam  Normal systems: cardiovascular and  "pulmonary    Airway   Mallampati: II  TM distance: <3 FB  Neck ROM: limited    Dental   (+) caps    Cardiovascular   Rhythm and rate: regular and normal      Pulmonary    breath sounds clear to auscultation            Lab Results   Component Value Date    WBC 8.0 02/11/2020    HGB 12.1 (L) 02/11/2020    HCT 36.8 (L) 02/11/2020     (L) 02/11/2020    CRP 8.6 (H) 02/11/2020    SED 8 02/11/2020     02/11/2020    POTASSIUM 4.4 02/11/2020    CHLORIDE 109 02/11/2020    CO2 22 02/11/2020    BUN 21 02/11/2020    CR 1.09 02/11/2020    GLC 91 02/11/2020    LATRELL 8.3 (L) 02/11/2020    ALBUMIN 3.6 02/11/2020    PROTTOTAL 6.6 (L) 02/11/2020    ALT 30 02/11/2020    AST 29 02/11/2020    ALKPHOS 82 02/11/2020    BILITOTAL 0.8 02/11/2020    LIPASE 99 02/11/2020    PTT 47 (H) 10/28/2017    INR 1.08 10/28/2017    TSH 4.33 10/20/2011       Preop Vitals  BP Readings from Last 3 Encounters:   02/11/20 129/89   11/12/19 (!) 142/78   08/05/19 123/77    Pulse Readings from Last 3 Encounters:   02/11/20 66   11/12/19 74   03/15/19 80      Resp Readings from Last 3 Encounters:   02/11/20 18   11/12/19 12   08/05/19 18    SpO2 Readings from Last 3 Encounters:   02/11/20 95%   11/12/19 97%   08/05/19 96%      Temp Readings from Last 1 Encounters:   02/11/20 99  F (37.2  C) (Oral)    Ht Readings from Last 1 Encounters:   02/11/20 1.727 m (5' 8\")      Wt Readings from Last 1 Encounters:   02/11/20 101.2 kg (223 lb)    Estimated body mass index is 33.91 kg/m  as calculated from the following:    Height as of 2/11/20: 1.727 m (5' 8\").    Weight as of 2/11/20: 101.2 kg (223 lb).       Anesthesia Plan      History & Physical Review  History and physical reviewed and following examination; no interval change.    ASA Status:  2 .    NPO Status:  > 8 hours    Plan for MAC Maintenance will be TIVA.  Reason for MAC:  Difficulty with conscious sedation (QS)         Postoperative Care  Postoperative pain management:  IV analgesics.  "     Consents  Anesthetic plan, risks, benefits and alternatives discussed with:  Patient.  Use of blood products discussed: No .   .                 MARCY Neumann CRNA

## 2020-02-12 NOTE — DISCHARGE INSTRUCTIONS
[facility]    Home Care Following Endoscopy          Activity:    You have just undergone an endoscopic procedure usually performed with conscious sedation.  Do not work or operate machinery (including a car) for at least 12 hours.      I encourage you to walk and attempt to pass this air as soon as possible.    Diet:    Return to the diet you were on before your procedure but eat lightly for the first 12-24 hours.    Drink plenty of water.    Resume any regular medications unless otherwise advised by your physician.  Please begin any new medication prescribed as a result of your procedure as directed by your physician.     If you had any biopsy or polyp removed please refrain from aspirin or aspirin products for 2 days.  If on Coumadin please restart as instructed by your physician.   Pain:    You may take Tylenol as needed for pain.  Expected Recovery:    You can expect some mild abdominal fullness and/or discomfort due to the air used to inflate your intestinal tract. It is also normal to have a mild sore throat after upper endoscopy.    Call Your Physician if You Have:    After Upper Endoscopy:  o Shoulder, back or chest pain.  o Difficulty breathing or swallowing.  o Vomiting blood.    Any questions or concerns about your recovery, please call [phone] or after hours [phone] Nurse Advice Line.    Follow-up Care:                   See your regular DR in 7-10 days to go over results and discuss findings.

## 2020-02-12 NOTE — ANESTHESIA CARE TRANSFER NOTE
Patient: Quan Murphy    Procedure(s):  ESOPHAGOGASTRODUODENOSCOPY (EGD)    Diagnosis: Esophageal pain [K22.8]  Gastroesophageal reflux disease, esophagitis presence not specified [K21.9]  Diagnosis Additional Information: No value filed.    Anesthesia Type:   MAC     Note:  Airway :Room Air  Patient transferred to:Phase II  Handoff Report: Identifed the Patient, Identified the Reponsible Provider, Reviewed the pertinent medical history, Discussed the surgical course, Reviewed Intra-OP anesthesia mangement and issues during anesthesia, Set expectations for post-procedure period and Allowed opportunity for questions and acknowledgement of understanding      Vitals: (Last set prior to Anesthesia Care Transfer)    CRNA VITALS  2/12/2020 0832 - 2/12/2020 0906      2/12/2020             Resp Rate (observed):  23                Electronically Signed By: MARCY Neumann CRNA  February 12, 2020  9:06 AM

## 2020-02-12 NOTE — ED PROVIDER NOTES
History     Chief Complaint   Patient presents with     Pharyngitis     HPI  History per patient and medical records    This is a 69-year-old male with history of hypertension, hyperlipidemia, coronary disease status post stenting, prostate cancer, presenting with pharyngitis.  Patient has noted 2 days of throat pain.  The pain extends from his throat down to his stomach/epigastric area.  It was at its worst this morning and he noted the pain increased when trying to swallow his coffee and some pain medications.  No difficulty swallowing or foreign body sensation.  No fevers or chills.  He has had reflux symptoms, especially at night and usually takes omeprazole 40 mg daily.  He has history of coronary disease but this pain is not the same as the pain related to his heart disease.  He denies any nausea or vomiting.  He is not on any nonsteroidal anti-inflammatories or prednisone.  No blackness or blood in his stools.  No shortness of breath.  He has had an occasional cough.  He does not smoke.  No recent illnesses, fevers, chills, vomiting.  No mouth sores.      Allergies:  Allergies   Allergen Reactions     Animal Dander      Azithromycin Nausea and Vomiting     Dust Mites      Pollen Extract      Smoke.        Problem List:    Patient Active Problem List    Diagnosis Date Noted     Renal hematoma 10/28/2017     Priority: Medium     Retroperitoneal hematoma 10/28/2017     Priority: Medium     Syncope 10/18/2017     Priority: Medium     S/P ORIF (open reduction internal fixation) fracture 08/03/2017     Priority: Medium     Closed Colles' fracture of right radius with routine healing, subsequent encounter 08/03/2017     Priority: Medium     Status post insertion of drug-eluting stent into left anterior descending artery for coronary artery disease 01/05/2017     Priority: Medium     Chest pain 12/24/2016     Priority: Medium     Arthropathy 02/04/2014     Priority: Medium     Problem list name updated by automated  process. Provider to review       Coronary atherosclerosis      Priority: Medium     Coronary artery disease  Problem list name updated by automated process. Provider to review       Hallux rigidus 07/16/2013     Priority: Medium     Inguinal hernia 06/28/2013     Priority: Medium     Degenerative arthritis of foot 06/28/2013     Priority: Medium     Advanced directives, counseling/discussion 05/24/2013     Priority: Medium     Advance Care Planning:   Receipt of ACP document:  Received: Health Care Directive which was witnessed or notarized on 8-18-08.  Document previously scanned on 7-8-13.  Validation form completed and sent to be scanned.  Code Status needs to be updated to reflect choices in most recent ACP document. Confirmed/documented designated decision maker(s). See permanent comments section of demographics in clinical tab. View document(s) and details by clicking on code status.   Added by Vandana Platt RN, System ACP Coordinator on 7/22/2013.    Advance Care Planning:   ACP Review and Resources Provided:  Reviewed chart for advance care plan.  Quan Murphy has no plan or code status on file however states presence of ACP document. Copy requested. Confirmed code status reflects current choices pending receipt of document/advance care plan review. Confirmed/documented designated decision maker(s). See permanent comments section of demographics in clinical tab.   Added by Krysten Jasmine on 5/24/2013             Hypertension goal BP (blood pressure) < 140/90 06/12/2012     Priority: Medium     Hyperlipidemia LDL goal <130 12/22/2011     Priority: Medium     Anxiety 11/02/2011     Priority: Medium     Allergic conjunctivitis 07/08/2008     Priority: Medium     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, insomnia type 11/17/2006     Priority: Medium     Problem list name updated by automated process. Provider to review       Acute reaction to stress 01/12/2005     Priority: Medium     Problem list name  updated by automated process. Provider to review       Chronic maxillary sinusitis 01/12/2005     Priority: Medium     Contracture of palmar fascia 01/12/2005     Priority: Medium     Benign neoplasm of skin of other and unspecified parts of face 01/12/2005     Priority: Medium     Malignant neoplasm of prostate (H) 11/26/2004     Priority: Medium        Past Medical History:    Past Medical History:   Diagnosis Date     Allergy, unspecified not elsewhere classified      Antiplatelet or antithrombotic long-term use      Anxiety      Arthritis      Chest pain      Chronic sinusitis      Coronary atherosclerosis of unspecified type of vessel, native or graft      Hyperlipidemia      Hypertension      Inguinal hernia      Kidney stones      Malignant neoplasm of prostate (H)      Prostate cancer (H)        Past Surgical History:    Past Surgical History:   Procedure Laterality Date     ARTHRODESIS FOOT  7/23/2013    Procedure: ARTHRODESIS FOOT;  Great Toe Arthrodesis Left Foot;  Surgeon: Ash Gonzalez DPM;  Location: [facility]     ARTHRODESIS FOOT  6/10/2014    Procedure: ARTHRODESIS FOOT;  Surgeon: Ash Gonzalez DPM;  Location: [facility] OR     C LAPAROSCOPY, SURGICAL PROSTATECTOMY, RETROPUBIC RADICAL, W/NERVE SPARING  11/30/2004    With full bilateral pelvic lymphadenectomy.  [facility].     C TOTAL KNEE ARTHROPLASTY  05/01/08    Left knee     COLONOSCOPY  10/7/2013    Procedure: COLONOSCOPY;  Colonoscopy;  Surgeon: Mike Fallon MD;  Location: [facility]     EXTRACORPOREAL SHOCK WAVE LITHOTRIPSY (ESWL) Bilateral 10/18/2017    Procedure: EXTRACORPOREAL SHOCK WAVE LITHOTRIPSY (ESWL);  BILATERAL EXTRACORPOREAL SHOCKWAVE LITHOTRIPSY ;  Surgeon: Meir Torres MD;  Location: [facility] OR     HC CORRECT BUNION,SIMPLE  08/11/2005    x3     HC REMV TOE BENIGN BONE LESN  08/11/2005     HERNIORRHAPHY INGUINAL  7/3/2013    Procedure: HERNIORRHAPHY INGUINAL;  Open Repair Inguinal hernia Right with mesh ;  Surgeon: Sam Escobar,  MD;  Location: [facility] OR     MOHS MICROGRAPHIC PROCEDURE  11    ear and chin-[facility]     OPEN REDUCTION INTERNAL FIXATION WRIST Right 2017    Procedure: OPEN REDUCTION INTERNAL FIXATION WRIST;  Right distal radius open reduction and internal fixation;  Surgeon: Pedro Blanca DO;  Location: [facility]     RECONSTRUCT FOREFOOT WITH METATARSOPHALANGEAL (MTP) FUSION  6/10/2014    Procedure: RECONSTRUCT FOREFOOT WITH METATARSOPHALANGEAL (MTP) FUSION;  Surgeon: Ash Gonzalez DPM;  Location: [facility]     STENT, CORONARY, DEMI       SURGICAL HISTORY OF -       lt knee     SURGICAL HISTORY OF -   10/2004    lithotripsy     SURGICAL HISTORY OF     angiogram with stent       Family History:    Family History   Problem Relation Age of Onset     Hypertension Father         has had MI      Connective Tissue Disorder Mother         LUPUS     Heart Disease Mother         poor valve-needing replacement       Social History:  Marital Status:   [2]  Social History     Tobacco Use     Smoking status: Never Smoker     Smokeless tobacco: Never Used   Substance Use Topics     Alcohol use: Yes     Alcohol/week: 8.3 standard drinks     Types: 10 Cans of beer per week     Comment: occasional      Drug use: No        Medications:    No current facility-administered medications on file prior to encounter.   [START ON 2020] ALPRAZolam (XANAX) 0.5 MG tablet, TAKE ONE TABLET BY MOUTH THREE TIMES A DAY AS NEEDED FOR ANXIETY  ASPIRIN ADULT LOW STRENGTH 81 MG EC tablet, TAKE ONE TABLET BY MOUTH ONCE DAILY  azithromycin (ZITHROMAX) 250 MG tablet, Two tablets first day, then one tablet daily for four days.  cetirizine (ZYRTEC) 10 MG tablet, Take 10 mg by mouth daily  CIALIS 20 MG tablet, Take 20 mg by mouth daily as needed   citalopram (CELEXA) 40 MG tablet, Take 1 tablet (40 mg) by mouth daily  [] fluconazole (DIFLUCAN) 100 MG tablet, Take 1 tablet (100 mg) by mouth daily for 14  "days  HYDROcodone-acetaminophen (NORCO) 7.5-325 MG per tablet, Take 1 tablet by mouth every 6 hours as needed for moderate to severe pain maximum 4 tablet(s) per day  hydrOXYzine (VISTARIL) 25 MG capsule, Take 1 capsule (25 mg) by mouth 3 times daily as needed for anxiety  ketoconazole (NIZORAL) 2 % external cream, Apply topically 2 times daily  lisinopril (PRINIVIL/ZESTRIL) 10 MG tablet, Take 1 tablet (10 mg) by mouth daily  neomycin-polymyxin-hydrocortisone (CORTISPORIN) 3.5-89165-6 otic suspension, Place 4 drops Into the left ear 4 times daily (Patient not taking: Reported on 11/6/2018)  nitroglycerin (NITROSTAT) 0.4 MG sublingual tablet, For chest pain place 1 tablet under the tongue every 5 minutes for 3 doses. If symptoms persist 5 minutes after 1st dose call 911. (Patient not taking: Reported on 11/12/2019)  olopatadine (PATANOL) 0.1 % ophthalmic solution, Place 1 drop into both eyes 2 times daily (Patient not taking: Reported on 3/15/2019)  omeprazole (PRILOSEC) 40 MG DR capsule, Take 1 capsule (40 mg) by mouth daily  rosuvastatin (CRESTOR) 40 MG tablet, Take 1 tablet (40 mg) by mouth daily  zolpidem (AMBIEN) 10 MG tablet, Take 1 tablet (10 mg) by mouth nightly as needed for sleep       Review of Systems  All other ROS reviewed and are negative or non-contributory except as stated in HPI.    Physical Exam   BP: 129/83  Pulse: 79  Temp: 99  F (37.2  C)  Resp: 18  Height: 172.7 cm (5' 8\")  Weight: 101.2 kg (223 lb)  SpO2: 94 %      Physical Exam  Vitals signs and nursing note reviewed.   Constitutional:       Appearance: He is well-developed and normal weight.      Comments: Pleasant, mildly anxious, older male sitting in the bed   HENT:      Head: Normocephalic.      Right Ear: Tympanic membrane normal.      Left Ear: Tympanic membrane normal.      Nose: No congestion.      Mouth/Throat:      Mouth: Mucous membranes are moist. No oral lesions.      Pharynx: Oropharynx is clear.   Eyes:      General: No " scleral icterus.     Conjunctiva/sclera: Conjunctivae normal.      Pupils: Pupils are equal, round, and reactive to light.   Neck:      Musculoskeletal: Normal range of motion and neck supple.   Cardiovascular:      Rate and Rhythm: Normal rate and regular rhythm.      Heart sounds: Normal heart sounds.   Pulmonary:      Effort: Pulmonary effort is normal.      Breath sounds: Normal breath sounds.   Chest:      Chest wall: No tenderness.   Abdominal:      General: Bowel sounds are normal.      Palpations: Abdomen is soft. There is no mass.      Comments: Mild epigastric tenderness without rebound   Musculoskeletal: Normal range of motion.      Right lower leg: No edema.      Left lower leg: No edema.   Skin:     General: Skin is warm and dry.      Findings: No rash.   Neurological:      General: No focal deficit present.      Mental Status: He is alert and oriented to person, place, and time.   Psychiatric:         Mood and Affect: Mood normal.         Behavior: Behavior normal.         ED Course (with Medical Decision Making)    Pt seen and examined by me.  RN and EPIC notes reviewed.      Patient with sore throat pain.  Pain extends down his esophagus into his epigastric area.  Pain worse with swallowing.  He has had a history of reflux and is on higher dose PPI.  No obvious intraoral lesions on exam.  Pain may be due to strep, esophageal candidiasis, reflux, gastritis, numerous other causes.  Plan strep swab, labs, chest x-ray.  Will try a GI cocktail.  Check lipase.    Strep was negative.  CBC shows slightly low hemoglobin at 12.1 but otherwise unremarkable.  Inflammatory markers are unremarkable and chemistry panel and lipase are all normal.  Chest x-ray shows no evidence of acute, cardio pulmonary disease.    Patient noted relief of his pain symptoms with GI cocktail.    I think he needs an endoscopy.  I was able to set 1 up for him tomorrow and he was reassured.  I am going to give him an Rx for Carafate to  use if needed.  I am going to send a message to his primary care provider to let them know about his ED visit and to follow-up with the endoscopy.  If he has any worsening, changes or concerns he can return at any time.        Procedures    Results for orders placed or performed during the hospital encounter of 02/11/20 (from the past 24 hour(s))   Rapid strep screen   Result Value Ref Range    Specimen Description Throat     Rapid Strep A Screen       NEGATIVE: No Group A streptococcal antigen detected by immunoassay, await culture report.   CBC with platelets differential   Result Value Ref Range    WBC 8.0 4.0 - 11.0 10e9/L    RBC Count 4.34 (L) 4.4 - 5.9 10e12/L    Hemoglobin 12.1 (L) 13.3 - 17.7 g/dL    Hematocrit 36.8 (L) 40.0 - 53.0 %    MCV 85 78 - 100 fl    MCH 27.9 26.5 - 33.0 pg    MCHC 32.9 31.5 - 36.5 g/dL    RDW 14.6 10.0 - 15.0 %    Platelet Count 149 (L) 150 - 450 10e9/L    Diff Method Automated Method     % Neutrophils 81.1 %    % Lymphocytes 9.3 %    % Monocytes 8.2 %    % Eosinophils 0.7 %    % Basophils 0.2 %    % Immature Granulocytes 0.5 %    Nucleated RBCs 0 0 /100    Absolute Neutrophil 6.5 1.6 - 8.3 10e9/L    Absolute Lymphocytes 0.8 0.8 - 5.3 10e9/L    Absolute Monocytes 0.7 0.0 - 1.3 10e9/L    Absolute Basophils 0.0 0.0 - 0.2 10e9/L    Abs Immature Granulocytes 0.0 0 - 0.4 10e9/L    Absolute Nucleated RBC 0.0    Erythrocyte sedimentation rate auto   Result Value Ref Range    Sed Rate 8 0 - 20 mm/h   CRP inflammation   Result Value Ref Range    CRP Inflammation 8.6 (H) 0.0 - 8.0 mg/L   Comprehensive metabolic panel   Result Value Ref Range    Sodium 139 133 - 144 mmol/L    Potassium 4.4 3.4 - 5.3 mmol/L    Chloride 109 94 - 109 mmol/L    Carbon Dioxide 22 20 - 32 mmol/L    Anion Gap 8 3 - 14 mmol/L    Glucose 91 70 - 99 mg/dL    Urea Nitrogen 21 7 - 30 mg/dL    Creatinine 1.09 0.66 - 1.25 mg/dL    GFR Estimate 69 >60 mL/min/[1.73_m2]    GFR Estimate If Black 80 >60 mL/min/[1.73_m2]     Calcium 8.3 (L) 8.5 - 10.1 mg/dL    Bilirubin Total 0.8 0.2 - 1.3 mg/dL    Albumin 3.6 3.4 - 5.0 g/dL    Protein Total 6.6 (L) 6.8 - 8.8 g/dL    Alkaline Phosphatase 82 40 - 150 U/L    ALT 30 0 - 70 U/L    AST 29 0 - 45 U/L   Lipase   Result Value Ref Range    Lipase 99 73 - 393 U/L   XR Chest 2 Views    Narrative    CHEST TWO VIEWS   2/11/2020 11:17 AM     HISTORY: Chest pain - throat/esophagus/epigastric.    COMPARISON: Chest x-ray dated 12/23/2016 and 3/23/2012.    FINDINGS:  Broad-based dextroconvex curvature of the mid to lower  thoracic spine is similar in appearance to the prior study dated  12/23/2016 and may be slightly more prominent than on the prior study  dated 3/23/2012. Lungs are grossly clear. Heart size, mediastinum and  pulmonary vascularity are within normal limits. No pneumothorax,  significant pleural fluid collection, or acute fracture is identified.  Amorphous calcification projected over the cephalad aspect of the  right humeral head could represent an ossified intra-articular right  shoulder free body. It could also represent calcific tendinitis.  Spondylotic spurring is seen in the spine. No fracture.       Impression    IMPRESSION:   1. No evidence of acute cardiopulmonary disease is seen.   2. Dextroconvex lateral curvature of the thoracic spine is again  noted.  3. Question ossified intra-articular free body in the right shoulder  versus possible calcific tendinitis or summation artifact. Recommend  clinical correlation.  5. No acute fracture, pneumothorax, pleural fluid collection,  pneumonia, or congestive heart failure to suggest etiology for  patient's chest pain.    SHANE CONTRERAS MD     *Note: Due to a large number of results and/or encounters for the requested time period, some results have not been displayed. A complete set of results can be found in Results Review.       Medications   lidocaine (XYLOCAINE) 2 % 15 mL, alum & mag hydroxide-simethicone (MYLANTA ES/MAALOX  ES) 15 mL  GI Cocktail (30 mLs Oral Given 2/11/20 1110)       Assessments & Plan     I have reviewed the findings, diagnosis, plan and need for follow up with the patient.    Discharge Medication List as of 2/11/2020 12:57 PM      START taking these medications    Details   sucralfate (CARAFATE) 1 GM/10ML suspension Take 10 mLs (1 g) by mouth 4 times daily, Disp-420 mL, R-0, E-Prescribe      ALPRAZolam (XANAX) 0.5 MG tablet TAKE ONE TABLET BY MOUTH THREE TIMES A DAY AS NEEDED FOR ANXIETY, Disp-90 tablet, R-1, E-Prescribe      HYDROcodone-acetaminophen (NORCO) 7.5-325 MG per tablet Take 1 tablet by mouth every 6 hours as needed for moderate to severe pain maximum 4 tablet(s) per day, Disp-120 tablet, R-0, E-Prescribe             Final diagnoses:   Esophageal pain   Gastroesophageal reflux disease, esophagitis presence not specified     Disposition: Patient discharged home in stable condition.  Plan as above.  Return for concerns.     Note: Chart documentation done in part with Dragon Voice Recognition software. Although reviewed after completion, some word and grammatical errors may remain.     2/11/2020   [facility] EMERGENCY DEPARTMENT     Janet Colon MD  02/11/20 [id number]

## 2020-02-12 NOTE — TELEPHONE ENCOUNTER
Reason for Call:  Work In Appointment, Requested Provider:  Jose Hernandez MD    PCP: Jose Hernandez    Reason for visit: Follow up    Duration of symptoms: NA    Have you been treated for this in the past? Yes    Additional comments: Pt had EGD today 2/12/20 and needs to schedule follow up for 7-10 days. Please call pt for work in.    Can we leave a detailed message on this number? YES    Phone number patient can be reached at: Home number on file [phone] (home)    Best Time: Anytime    Call taken on 2/12/2020 at 10:46 AM by Tammi Scott

## 2020-02-12 NOTE — ANESTHESIA POSTPROCEDURE EVALUATION
Patient: Quan GANT Jeffrey    Procedure(s):  ESOPHAGOGASTRODUODENOSCOPY (EGD)    Diagnosis:Esophageal pain [K22.8]  Gastroesophageal reflux disease, esophagitis presence not specified [K21.9]  Diagnosis Additional Information: No value filed.    Anesthesia Type:  MAC    Note:  Anesthesia Post Evaluation    Patient location during evaluation: Phase 2 and Bedside  Patient participation: Able to fully participate in evaluation  Level of consciousness: awake  Pain management: adequate  Airway patency: patent  Cardiovascular status: acceptable and hemodynamically stable  Respiratory status: acceptable, room air and nonlabored ventilation  Hydration status: stable  PONV: none     Anesthetic complications: None    Comments: Patient was happy with the anesthesia care received and no anesthesia related complications were noted.  I will follow up with the patient again if it is needed.        Last vitals:  Vitals:    02/12/20 0740   SpO2: 94%         Electronically Signed By: MARCY Neumann CRNA  February 12, 2020  9:07 AM

## 2020-02-13 LAB
BACTERIA SPEC CULT: NORMAL
SPECIMEN SOURCE: NORMAL

## 2020-02-13 NOTE — RESULT ENCOUNTER NOTE
Final Beta strep group A r/o culture is NEGATIVE for Group A streptococcus.    No treatment or change in treatment per [facility] Strep protocol

## 2020-02-14 ENCOUNTER — MYC MEDICAL ADVICE (OUTPATIENT)
Dept: FAMILY MEDICINE | Facility: CLINIC | Age: 70
End: 2020-02-14

## 2020-02-14 NOTE — TELEPHONE ENCOUNTER
Dr. Hernandez can see this patient Monday at 10:20 AM. Patient called and notified. He was instructed that he should ignore the [facility] message telling him to come in at noon. He understands that he should come in at 10:20 AM.    Jessica Bynum on 2/14/2020 at 3:28 PM

## 2020-02-17 ENCOUNTER — TELEPHONE (OUTPATIENT)
Dept: FAMILY MEDICINE | Facility: CLINIC | Age: 70
End: 2020-02-17

## 2020-02-17 ENCOUNTER — OFFICE VISIT (OUTPATIENT)
Dept: FAMILY MEDICINE | Facility: CLINIC | Age: 70
End: 2020-02-17
Payer: MEDICARE

## 2020-02-17 VITALS
TEMPERATURE: 98.1 F | BODY MASS INDEX: 32.08 KG/M2 | OXYGEN SATURATION: 97 % | RESPIRATION RATE: 16 BRPM | WEIGHT: 216.6 LBS | DIASTOLIC BLOOD PRESSURE: 84 MMHG | HEIGHT: 69 IN | HEART RATE: 103 BPM | SYSTOLIC BLOOD PRESSURE: 136 MMHG

## 2020-02-17 DIAGNOSIS — K44.9 PARAESOPHAGEAL HERNIA: Primary | ICD-10-CM

## 2020-02-17 DIAGNOSIS — B37.0 THRUSH: ICD-10-CM

## 2020-02-17 LAB — COPATH REPORT: NORMAL

## 2020-02-17 PROCEDURE — 99214 OFFICE O/P EST MOD 30 MIN: CPT | Performed by: FAMILY MEDICINE

## 2020-02-17 RX ORDER — FLUCONAZOLE 100 MG/1
100 TABLET ORAL DAILY
Qty: 10 TABLET | Refills: 0 | Status: SHIPPED | OUTPATIENT
Start: 2020-02-17 | End: 2020-03-09

## 2020-02-17 ASSESSMENT — MIFFLIN-ST. JEOR: SCORE: 1738.1

## 2020-02-17 NOTE — TELEPHONE ENCOUNTER
ONCOLOGY INTAKE: Records Information      APPT INFORMATION: [id number] Dr. Fuentes Adams  Referring provider:  Dr. 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 Internal Referral    Has patient been seen for any external appt for this diagnosis? No    If yes, where? NA    Has patient had any imaging or procedures outside of [facility] for this condition? No      If Yes, where? NA    ADDITIONAL INFORMATION:  None

## 2020-02-17 NOTE — TELEPHONE ENCOUNTER
Action    Action Taken 2/17/20: Images from [facility] resolved, and now viewable to PACS  2:43 PM       RECORDS STATUS - ALL OTHER DIAGNOSIS      [facility]   DATE RECEIVED: 2/17/20   NOTES STATUS DETAILS   OFFICE NOTE from referring provider Jose Bush MD: 2/17/20   OFFICE NOTE from medical oncologist     DISCHARGE SUMMARY from hospital [facility] 2/12/20 12/29/19: David   DISCHARGE REPORT from the ER [facility] 2/11/20   OPERATIVE REPORT Epic 2/12/20: EGD   MEDICATION LIST [facility] 2/17/20   CLINICAL TRIAL TREATMENTS TO DATE     LABS     PATHOLOGY REPORTS [facility] 2/12/20: Path   ANYTHING RELATED TO DIAGNOSIS Epic 2/11/20   GENONOMIC TESTING     TYPE:     IMAGING (NEED IMAGES & REPORT)     XR PACS 2/11/20   CT SCANS PACS 12/29/19 - Hernandez, Report in [facility]   MRI     MAMMO     ULTRASOUND     PET

## 2020-02-18 ENCOUNTER — OFFICE VISIT (OUTPATIENT)
Dept: SURGERY | Facility: CLINIC | Age: 70
End: 2020-02-18
Attending: FAMILY MEDICINE
Payer: MEDICARE

## 2020-02-18 ENCOUNTER — PRE VISIT (OUTPATIENT)
Dept: ONCOLOGY | Facility: CLINIC | Age: 70
End: 2020-02-18

## 2020-02-18 ENCOUNTER — ALLIED HEALTH/NURSE VISIT (OUTPATIENT)
Dept: FAMILY MEDICINE | Facility: CLINIC | Age: 70
End: 2020-02-18
Payer: MEDICARE

## 2020-02-18 VITALS
OXYGEN SATURATION: 96 % | SYSTOLIC BLOOD PRESSURE: 132 MMHG | WEIGHT: 216.3 LBS | HEIGHT: 69 IN | DIASTOLIC BLOOD PRESSURE: 88 MMHG | HEART RATE: 64 BPM | RESPIRATION RATE: 14 BRPM | TEMPERATURE: 98.1 F | BODY MASS INDEX: 32.04 KG/M2

## 2020-02-18 DIAGNOSIS — K44.9 PARAESOPHAGEAL HERNIA: ICD-10-CM

## 2020-02-18 DIAGNOSIS — Z23 NEED FOR VACCINATION: Primary | ICD-10-CM

## 2020-02-18 DIAGNOSIS — K44.9 HIATAL HERNIA: ICD-10-CM

## 2020-02-18 PROCEDURE — 90750 HZV VACC RECOMBINANT IM: CPT

## 2020-02-18 PROCEDURE — 90471 IMMUNIZATION ADMIN: CPT

## 2020-02-18 PROCEDURE — 99207 ZZC NO CHARGE NURSE ONLY: CPT

## 2020-02-18 PROCEDURE — 99214 OFFICE O/P EST MOD 30 MIN: CPT | Mod: ZP | Performed by: THORACIC SURGERY (CARDIOTHORACIC VASCULAR SURGERY)

## 2020-02-18 PROCEDURE — G0463 HOSPITAL OUTPT CLINIC VISIT: HCPCS | Mod: ZF

## 2020-02-18 ASSESSMENT — PAIN SCALES - GENERAL: PAINLEVEL: NO PAIN (0)

## 2020-02-18 ASSESSMENT — MIFFLIN-ST. JEOR: SCORE: 1736.74

## 2020-02-18 NOTE — LETTER
2/18/2020       RE: Quan Murphy  [address]  [facility] [address]     Dear Colleague,    Thank you for referring your patient, Quan Murphy, to the [facility]. Please see a copy of my visit note below.    THORACIC SURGERY - NEW PATIENT OFFICE VISIT      Dear Dr. Hernandez,    I saw Mr. Murphy at your request in consultation for the evaluation and treatment of a sliding hiatal hernia.     HPI  Quan Murphy is a 69 year old man with recent dysphagia and odynophagia who was found to have a small hiatal hernia on EGD performed 2/12/20. He has a history of chronic sinusitis and was recently on antibiotics. He had some abdominal pain starting about a month ago and started taking omeprazole, which helped with the pain. He subsequently had a sore throat starting about 2 weeks ago leading to a visit to the ED. The subsequent EGD demonstrated esophageal candidiasis and concern for a small paraesophageal hernia. He did not tolerate initial treatment for his candidiasis and just started diflucan yesterday. Overall, his sore throat and abdominal pain are much improved.     Previsit Tests   CT Chest/Ab/Pelvis (8/5/19)     IMPRESSION:  1. No evidence of hiatal hernia    EGD (2/12/20) -           Impression:          - Esophageal plaques were found, suspicious for                        candidiasis. Biopsied.                        - Z-line irregular, 42 cm from the incisors. Biopsied. Diaphragm pinch at 45 cm                       - Small paraesophageal hernia.     PMH  HTN  HLD  CAD s/p stenting in 2016  Multiple kidney stones    Past Medical History:   Diagnosis Date     Allergy, unspecified not elsewhere classified     Seasonal allergies, pollen, dust, smoke and animals     Antiplatelet or antithrombotic long-term use      Anxiety      Arthritis      Chest pain      Chronic sinusitis      Coronary atherosclerosis of unspecified type of vessel, native or graft     Coronary artery disease      Hyperlipidemia      Hypertension      Inguinal hernia      Kidney stones      Malignant neoplasm of prostate (H)     Prostate cancer     Prostate cancer (H)         PSH  Inguinal hernia  Multiple orthopedic procedures  Lithotripsy x 2    Past Surgical History:   Procedure Laterality Date     ARTHRODESIS FOOT  7/23/2013    Procedure: ARTHRODESIS FOOT;  Great Toe Arthrodesis Left Foot;  Surgeon: Ash Gonzalez DPM;  Location: [facility]     ARTHRODESIS FOOT  6/10/2014    Procedure: ARTHRODESIS FOOT;  Surgeon: Ash Gonzalez DPM;  Location: [facility] OR     C LAPAROSCOPY, SURGICAL PROSTATECTOMY, RETROPUBIC RADICAL, W/NERVE SPARING  11/30/2004    With full bilateral pelvic lymphadenectomy.  [facility].     C TOTAL KNEE ARTHROPLASTY  05/01/08    Left knee     COLONOSCOPY  10/7/2013    Procedure: COLONOSCOPY;  Colonoscopy;  Surgeon: Mike Fallon MD;  Location:  GI     ESOPHAGOSCOPY, GASTROSCOPY, DUODENOSCOPY (EGD), COMBINED N/A 2/12/2020    Procedure: ESOPHAGOGASTRODUODENOSCOPY (EGD);  Surgeon: Sam Escobar MD;  Location: [facility]     EXTRACORPOREAL SHOCK WAVE LITHOTRIPSY (ESWL) Bilateral 10/18/2017    Procedure: EXTRACORPOREAL SHOCK WAVE LITHOTRIPSY (ESWL);  BILATERAL EXTRACORPOREAL SHOCKWAVE LITHOTRIPSY ;  Surgeon: Meir Torres MD;  Location: [facility] OR     HC CORRECT BUNION,SIMPLE  08/11/2005    x3     HC REMV TOE BENIGN BONE LESN  08/11/2005     HERNIORRHAPHY INGUINAL  7/3/2013    Procedure: HERNIORRHAPHY INGUINAL;  Open Repair Inguinal hernia Right with mesh ;  Surgeon: Sam Escobar MD;  Location: [facility] OR     MOHS MICROGRAPHIC PROCEDURE  08/23/11    ear and chin-[facility]     OPEN REDUCTION INTERNAL FIXATION WRIST Right 7/18/2017    Procedure: OPEN REDUCTION INTERNAL FIXATION WRIST;  Right distal radius open reduction and internal fixation;  Surgeon: Pedro Blanca DO;  Location: [facility]     RECONSTRUCT FOREFOOT WITH METATARSOPHALANGEAL (MTP) FUSION  6/10/2014    Procedure: RECONSTRUCT  FOREFOOT WITH METATARSOPHALANGEAL (MTP) FUSION;  Surgeon: Ash Gonzalez DPM;  Location: [facility] OR     STENT, CORONARY, DEMI       SURGICAL HISTORY OF -   1999/1974    lt knee     SURGICAL HISTORY OF -   10/2004    lithotripsy     SURGICAL HISTORY OF -   11/05    angiogram with stent        ETOH - 1-2 drinks per day  TOB - Never smoker  He is on a low dose opioid for chronic sinusitis    Physical examination  BMI 32.22  Overall well appearing, anxious    From a personal perspective, patient is here with his wife. They live in [place], [place]. He is a retired  and . He works part-time at a local school.       IMPRESSION   (K44.9) Hiatal hernia     This is a 69 year old man with what appears to be a sliding hiatal hernia, not a paraesophageal hernia. His symptoms were likely related to esophageal candidiasis and not his hernia, which he has likely had for many years. His symptoms have nearly resolved with PPI and candida treatment.    PLAN  I spent a total of 30 minutes with Mr. Murphy and his wife, more than 50% of which were spent in counseling, coordination of care, and face-to-face time. I reviewed the plan as follows:  - Discussed with patient that his symptoms are likely unrelated to his hiatal hernia  - Recommended completion of candidiasis treatment and discontinuing PPI if his symptoms resolve completely  - Educated patient on worsening symptoms related to hiatal hernia including dysphagia and regurgitation, recommended that he contact our office if he notices worsening of these symptoms  - Follow up in clinic as needed    All questions were answered and Quan Murphy and present family were in agreement with the plan.  I appreciate the opportunity to participate in the care of your patient and will keep you updated.  Sincerely,    Fuentes Adams

## 2020-02-18 NOTE — NURSING NOTE
"Oncology Rooming Note    February 18, 2020 1:57 PM   Quan Murphy is a 69 year old male who presents for:    Chief Complaint   Patient presents with     Oncology Clinic Visit     New; Paraesophageal Hernia     Initial Vitals: /88   Pulse 64   Temp 98.1  F (36.7  C) (Oral)   Resp 14   Ht 1.745 m (5' 8.7\")   Wt 98.1 kg (216 lb 4.8 oz)   SpO2 96%   BMI 32.22 kg/m   Estimated body mass index is 32.22 kg/m  as calculated from the following:    Height as of this encounter: 1.745 m (5' 8.7\").    Weight as of this encounter: 98.1 kg (216 lb 4.8 oz). Body surface area is 2.18 meters squared.  No Pain (0) Comment: Data Unavailable   No LMP for male patient.  Allergies reviewed: Yes  Medications reviewed: Yes    Medications: Medication refills not needed today.  Pharmacy name entered into [facility]:    [facility] [address] [facility]    Clinical concerns: New pt to discuss paraesophageal hernia. Dr Adams was notified.      Priyanka Mckeon CMA              "

## 2020-02-18 NOTE — PROGRESS NOTES
THORACIC SURGERY - NEW PATIENT OFFICE VISIT      Dear Dr. Hernandez,    I saw Mr. Murphy at your request in consultation for the evaluation and treatment of a sliding hiatal hernia.     HPI  Quan Murphy is a 69 year old man with recent dysphagia and odynophagia who was found to have a small hiatal hernia on EGD performed 2/12/20. He has a history of chronic sinusitis and was recently on antibiotics. He had some abdominal pain starting about a month ago and started taking omeprazole, which helped with the pain. He subsequently had a sore throat starting about 2 weeks ago leading to a visit to the ED. The subsequent EGD demonstrated esophageal candidiasis and concern for a small paraesophageal hernia. He did not tolerate initial treatment for his candidiasis and just started diflucan yesterday. Overall, his sore throat and abdominal pain are much improved.     Previsit Tests   CT Chest/Ab/Pelvis (8/5/19)     IMPRESSION:  1. No evidence of hiatal hernia    EGD (2/12/20) -           Impression:          - Esophageal plaques were found, suspicious for                        candidiasis. Biopsied.                        - Z-line irregular, 42 cm from the incisors. Biopsied. Diaphragm pinch at 45 cm                       - Small paraesophageal hernia.     PMH  HTN  HLD  CAD s/p stenting in 2016  Multiple kidney stones    Past Medical History:   Diagnosis Date     Allergy, unspecified not elsewhere classified     Seasonal allergies, pollen, dust, smoke and animals     Antiplatelet or antithrombotic long-term use      Anxiety      Arthritis      Chest pain      Chronic sinusitis      Coronary atherosclerosis of unspecified type of vessel, native or graft     Coronary artery disease     Hyperlipidemia      Hypertension      Inguinal hernia      Kidney stones      Malignant neoplasm of prostate (H)     Prostate cancer     Prostate cancer (H)         PSH  Inguinal hernia  Multiple orthopedic procedures  Lithotripsy x 2    Past  Surgical History:   Procedure Laterality Date     ARTHRODESIS FOOT  7/23/2013    Procedure: ARTHRODESIS FOOT;  Great Toe Arthrodesis Left Foot;  Surgeon: Ash Gonzalez DPM;  Location: [facility]     ARTHRODESIS FOOT  6/10/2014    Procedure: ARTHRODESIS FOOT;  Surgeon: Ash Gonzalez DPM;  Location: [facility]     C LAPAROSCOPY, SURGICAL PROSTATECTOMY, RETROPUBIC RADICAL, W/NERVE SPARING  11/30/2004    With full bilateral pelvic lymphadenectomy.  [facility].     C TOTAL KNEE ARTHROPLASTY  05/01/08    Left knee     COLONOSCOPY  10/7/2013    Procedure: COLONOSCOPY;  Colonoscopy;  Surgeon: Mike Fallon MD;  Location: [facility]     ESOPHAGOSCOPY, GASTROSCOPY, DUODENOSCOPY (EGD), COMBINED N/A 2/12/2020    Procedure: ESOPHAGOGASTRODUODENOSCOPY (EGD);  Surgeon: Sam Escobar MD;  [facility]     EXTRACORPOREAL SHOCK WAVE LITHOTRIPSY (ESWL) Bilateral 10/18/2017    Procedure: EXTRACORPOREAL SHOCK WAVE LITHOTRIPSY (ESWL);  BILATERAL EXTRACORPOREAL SHOCKWAVE LITHOTRIPSY ;  Surgeon: Meir Torres MD;  Location: [facility] OR     HC CORRECT BUNION,SIMPLE  08/11/2005    x3     HC REMV TOE BENIGN BONE LESN  08/11/2005     HERNIORRHAPHY INGUINAL  7/3/2013    Procedure: HERNIORRHAPHY INGUINAL;  Open Repair Inguinal hernia Right with mesh ;  Surgeon: Sam Escobar MD;  Location: [facility]     MOHS MICROGRAPHIC PROCEDURE  08/23/11    ear and chin-[facility]     OPEN REDUCTION INTERNAL FIXATION WRIST Right 7/18/2017    Procedure: OPEN REDUCTION INTERNAL FIXATION WRIST;  Right distal radius open reduction and internal fixation;  Surgeon: Pedro Blanca DO;  Location: [facility]     RECONSTRUCT FOREFOOT WITH METATARSOPHALANGEAL (MTP) FUSION  6/10/2014    Procedure: RECONSTRUCT FOREFOOT WITH METATARSOPHALANGEAL (MTP) FUSION;  Surgeon: Ash Gonzalez DPM;  Location: [facility]     STENT, CORONARY, DEMI       SURGICAL HISTORY OF -   1999/1974    lt knee     SURGICAL HISTORY OF -   10/2004    lithotripsy     SURGICAL  HISTORY OF -   11/05    angiogram with stent        ETOH - 1-2 drinks per day  TOB - Never smoker  He is on a low dose opioid for chronic sinusitis    Physical examination  BMI 32.22  Overall well appearing, anxious    From a personal perspective, patient is here with his wife. They live in [place], [place]. He is a retired  and . He works part-time at a local school.       IMPRESSION   (K44.9) Hiatal hernia     This is a 69 year old man with what appears to be a sliding hiatal hernia, not a paraesophageal hernia. His symptoms were likely related to esophageal candidiasis and not his hernia, which he has likely had for many years. His symptoms have nearly resolved with PPI and candida treatment.    PLAN  I spent a total of 30 minutes with Mr. Murphy and his wife, more than 50% of which were spent in counseling, coordination of care, and face-to-face time. I reviewed the plan as follows:  - Discussed with patient that his symptoms are likely unrelated to his hiatal hernia  - Recommended completion of candidiasis treatment and discontinuing PPI if his symptoms resolve completely  - Educated patient on worsening symptoms related to hiatal hernia including dysphagia and regurgitation, recommended that he contact our office if he notices worsening of these symptoms  - Follow up in clinic as needed    All questions were answered and Quan Murphy and present family were in agreement with the plan.  I appreciate the opportunity to participate in the care of your patient and will keep you updated.  Sincerely,    Fuentes Adams

## 2020-02-20 ENCOUNTER — MYC MEDICAL ADVICE (OUTPATIENT)
Dept: FAMILY MEDICINE | Facility: CLINIC | Age: 70
End: 2020-02-20

## 2020-02-22 ENCOUNTER — MYC REFILL (OUTPATIENT)
Dept: FAMILY MEDICINE | Facility: CLINIC | Age: 70
End: 2020-02-22

## 2020-02-22 DIAGNOSIS — K21.00 GASTROESOPHAGEAL REFLUX DISEASE WITH ESOPHAGITIS: ICD-10-CM

## 2020-02-24 ENCOUNTER — MYC REFILL (OUTPATIENT)
Dept: FAMILY MEDICINE | Facility: CLINIC | Age: 70
End: 2020-02-24

## 2020-02-24 DIAGNOSIS — K21.00 GASTROESOPHAGEAL REFLUX DISEASE WITH ESOPHAGITIS: Primary | ICD-10-CM

## 2020-02-24 DIAGNOSIS — K21.00 GASTROESOPHAGEAL REFLUX DISEASE WITH ESOPHAGITIS: ICD-10-CM

## 2020-02-25 RX ORDER — OMEPRAZOLE 40 MG/1
40 CAPSULE, DELAYED RELEASE ORAL DAILY
Qty: 90 CAPSULE | Refills: 3 | Status: SHIPPED | OUTPATIENT
Start: 2020-02-25 | End: 2020-07-15

## 2020-02-25 NOTE — TELEPHONE ENCOUNTER
"Omeprazole  Last Written Prescription Date:  6/6/19  Last Fill Quantity: 90,  # refills: 2   Last office visit: 2/18/2020 with prescribing provider:   Dr. Hernandez   Future Office Visit:  NONE  Requested Prescriptions   Pending Prescriptions Disp Refills     omeprazole (PRILOSEC) 40 MG DR capsule 90 capsule 2     Sig: Take 1 capsule (40 mg) by mouth daily       PPI Protocol Passed - 2/25/2020 10:27 AM        Passed - Not on Clopidogrel (unless Pantoprazole ordered)        Passed - No diagnosis of osteoporosis on record        Passed - Recent (12 mo) or future (30 days) visit within the authorizing provider's specialty     Patient has had an office visit with the authorizing provider or a provider within the authorizing providers department within the previous 12 mos or has a future within next 30 days. See \"Patient Info\" tab in inbasket, or \"Choose Columns\" in Meds & Orders section of the refill encounter.            Passed - Medication is active on med list        Passed - Patient is age 18 or older        Prescription approved per [facility] Refill Protocol.  RUTH Rolle      "

## 2020-02-26 RX ORDER — OMEPRAZOLE 40 MG/1
40 CAPSULE, DELAYED RELEASE ORAL DAILY
Qty: 90 CAPSULE | Refills: 2 | OUTPATIENT
Start: 2020-02-26

## 2020-02-26 RX ORDER — OMEPRAZOLE 40 MG/1
CAPSULE, DELAYED RELEASE ORAL
Qty: 90 CAPSULE | Refills: 2 | OUTPATIENT
Start: 2020-02-26

## 2020-02-26 NOTE — TELEPHONE ENCOUNTER
"omeprazole  Last Written Prescription Date:  2/25/2020  Last Fill Quantity: 90,  # refills: 3   Last office visit: 2/18/2020 with prescribing provider:      Future Office Visit:      Requested Prescriptions   Pending Prescriptions Disp Refills     omeprazole (PRILOSEC) 40 MG DR capsule 90 capsule 2     Sig: Take 1 capsule (40 mg) by mouth daily       PPI Protocol Passed - 2/24/2020  9:07 AM        Passed - Not on Clopidogrel (unless Pantoprazole ordered)        Passed - No diagnosis of osteoporosis on record        Passed - Recent (12 mo) or future (30 days) visit within the authorizing provider's specialty     Patient has had an office visit with the authorizing provider or a provider within the authorizing providers department within the previous 12 mos or has a future within next 30 days. See \"Patient Info\" tab in inbasket, or \"Choose Columns\" in Meds & Orders section of the refill encounter.              Passed - Medication is active on med list        Passed - Patient is age 18 or older          Duplicate-denied      Nichol Hernandez RN on 2/26/2020 at 11:22 AM    "

## 2020-02-27 NOTE — TELEPHONE ENCOUNTER
Omeprazole  Last Written Prescription Date:  02/25/2020  Last Fill Quantity: 90,  # refills: 3   Last office visit: 2/18/2020 with prescribing provider:     Future Office Visit:      Declined filled yesterday.  Lanny Mendoza RN BSN

## 2020-03-02 ENCOUNTER — MYC MEDICAL ADVICE (OUTPATIENT)
Dept: FAMILY MEDICINE | Facility: CLINIC | Age: 70
End: 2020-03-02

## 2020-03-06 ENCOUNTER — MYC REFILL (OUTPATIENT)
Dept: FAMILY MEDICINE | Facility: CLINIC | Age: 70
End: 2020-03-06

## 2020-03-06 DIAGNOSIS — F41.9 ANXIETY: ICD-10-CM

## 2020-03-06 DIAGNOSIS — I10 BENIGN ESSENTIAL HYPERTENSION: ICD-10-CM

## 2020-03-06 DIAGNOSIS — G47.01 SLEEP DISORDER DUE TO A GENERAL MEDICAL CONDITION, INSOMNIA TYPE: ICD-10-CM

## 2020-03-06 NOTE — TELEPHONE ENCOUNTER
"Celexa  Last Written Prescription Date:  /04/15/2019/  Last Fill Quantity: 30,  # refills: 10   Last office visit: 2/18/2020 with prescribing provider:  David Wise Office Visit:      Aspirin  Last Written Prescription Date:  09/04/2019  Last Fill Quantity: 90,  # refills: 1   Last office visit: 2/18/2020 with prescribing provider:  David Wise Office Visit:    Requested Prescriptions   Pending Prescriptions Disp Refills     citalopram (CELEXA) 40 MG tablet 30 tablet 10     Sig: Take 1 tablet (40 mg) by mouth daily       SSRIs Protocol Passed - 3/6/2020  8:27 AM        Passed - Recent (12 mo) or future (30 days) visit within the authorizing provider's specialty     Patient has had an office visit with the authorizing provider or a provider within the authorizing providers department within the previous 12 mos or has a future within next 30 days. See \"Patient Info\" tab in inbasket, or \"Choose Columns\" in Meds & Orders section of the refill encounter.              Passed - Medication is active on med list        Passed - Patient is age 18 or older        aspirin (ASPIRIN ADULT LOW STRENGTH) 81 MG EC tablet 90 tablet 1     Sig: Take 1 tablet (81 mg) by mouth daily       Analgesics (Non-Narcotic Tylenol and ASA Only) Passed - 3/6/2020  8:27 AM        Passed - Recent (12 mo) or future (30 days) visit within the authorizing provider's specialty     Patient has had an office visit with the authorizing provider or a provider within the authorizing providers department within the previous 12 mos or has a future within next 30 days. See \"Patient Info\" tab in inbasket, or \"Choose Columns\" in Meds & Orders section of the refill encounter.              Passed - Patient is age 20 years or older     If ASA is flagged for ages under 20 years old. Forward to provider for confirmation Ryes Syndrome is not a concern.              Passed - Medication is active on med list      Kesha Meeks CMA       "

## 2020-03-06 NOTE — TELEPHONE ENCOUNTER
Zolpidem 10 MG       Last Written Prescription Date:  9-18-19  Last Fill Quantity: 30,   # refills: 5  Last Office Visit: 2/17/2020  Future Office visit:       Routing refill request to provider for review/approval because:  Drug not on the [facility], [facility] or [facility] refill protocol or controlled substance

## 2020-03-08 ENCOUNTER — MYC REFILL (OUTPATIENT)
Dept: FAMILY MEDICINE | Facility: CLINIC | Age: 70
End: 2020-03-08

## 2020-03-08 DIAGNOSIS — J01.00 ACUTE MAXILLARY SINUSITIS, RECURRENCE NOT SPECIFIED: ICD-10-CM

## 2020-03-09 ENCOUNTER — MYC REFILL (OUTPATIENT)
Dept: FAMILY MEDICINE | Facility: CLINIC | Age: 70
End: 2020-03-09

## 2020-03-09 DIAGNOSIS — B37.0 THRUSH: ICD-10-CM

## 2020-03-09 RX ORDER — CITALOPRAM HYDROBROMIDE 40 MG/1
40 TABLET ORAL DAILY
Qty: 30 TABLET | Refills: 10 | Status: SHIPPED | OUTPATIENT
Start: 2020-03-09 | End: 2021-01-08

## 2020-03-09 RX ORDER — AZITHROMYCIN 250 MG/1
TABLET, FILM COATED ORAL
Qty: 6 TABLET | Refills: 0 | Status: SHIPPED | OUTPATIENT
Start: 2020-03-09 | End: 2020-05-01

## 2020-03-09 NOTE — TELEPHONE ENCOUNTER
Prescription approved per [facility] Refill Protocol.  Nichol Hernandez RN

## 2020-03-09 NOTE — TELEPHONE ENCOUNTER
"Ongoing \"sinus infections\" was given zpak 1/14/2020. Symptoms back. Sending to PCP for review. Seen in ED 2/11 for throat pain, strep negative.     \"Last dose appears to have caused a yeast problem.  Sore throat and chest congestion have returned.  Could I try an antibiotic where that might be less of a concern.  This has been going on for some time now -(coughing up green gunk and having a very sore  throat - although strep was ruled out).  I see you on the 25th, but I hate to wait that long to get something started.\"    Nichol Hernandez RN      "

## 2020-03-09 NOTE — TELEPHONE ENCOUNTER
"Pt called stating he is frustrated that he specifically stated he came down with a yeast infection that caused many issues the last time he used a zpack and then was still prescribed this for his ongoing upper respiratory infection. He is asking to be prescribed ampicillin, as he knows this is affective and does not cause any reactions. I did inform him that Dr. Hernandez and most other doctors are gone for the day and he is also aware that Dr. Hernandez is out tomorrow. In talking with RUTH Schaefer, she said she would talk to one of the late providers and see if there is any assistance that can be done yet tonight. Pt was made aware of this and stated that he would very much appreciate if it could be addressed tonight as he is very ill and the \"history of other doctors getting back to me when David is out is horrible at best.\" Please call pt to inform of medication request status. [phone].  "

## 2020-03-10 RX ORDER — FLUCONAZOLE 100 MG/1
100 TABLET ORAL DAILY
Qty: 10 TABLET | Refills: 0 | Status: SHIPPED | OUTPATIENT
Start: 2020-03-10 | End: 2020-03-18

## 2020-03-11 RX ORDER — ZOLPIDEM TARTRATE 10 MG/1
10 TABLET ORAL
Qty: 30 TABLET | Refills: 5 | Status: SHIPPED | OUTPATIENT
Start: 2020-03-11 | End: 2020-08-26

## 2020-03-11 NOTE — TELEPHONE ENCOUNTER
Patient calling regarding this refill. He is out of this medication. Please call him back when ready.

## 2020-03-15 ENCOUNTER — MYC REFILL (OUTPATIENT)
Dept: FAMILY MEDICINE | Facility: CLINIC | Age: 70
End: 2020-03-15

## 2020-03-15 DIAGNOSIS — J32.0 CHRONIC MAXILLARY SINUSITIS: ICD-10-CM

## 2020-03-15 DIAGNOSIS — S37.019A KIDNEY HEMATOMA, UNSPECIFIED LATERALITY, INITIAL ENCOUNTER: ICD-10-CM

## 2020-03-15 DIAGNOSIS — M12.9 ARTHROPATHY: ICD-10-CM

## 2020-03-16 ENCOUNTER — MYC REFILL (OUTPATIENT)
Dept: FAMILY MEDICINE | Facility: CLINIC | Age: 70
End: 2020-03-16

## 2020-03-16 DIAGNOSIS — M12.9 ARTHROPATHY: ICD-10-CM

## 2020-03-16 RX ORDER — HYDROXYZINE PAMOATE 25 MG/1
25 CAPSULE ORAL 3 TIMES DAILY PRN
Qty: 60 CAPSULE | Refills: 3 | Status: CANCELLED | OUTPATIENT
Start: 2020-03-16

## 2020-03-16 RX ORDER — HYDROCODONE BITARTRATE AND ACETAMINOPHEN 7.5; 325 MG/1; MG/1
1 TABLET ORAL EVERY 6 HOURS PRN
Qty: 120 TABLET | Refills: 0 | Status: SHIPPED | OUTPATIENT
Start: 2020-03-16 | End: 2020-04-08

## 2020-03-16 NOTE — TELEPHONE ENCOUNTER
HYDROcodone-acetaminophen (NORCO) 7.5-325 MG per tablet       Last Written Prescription Date:  2/11/20  Last Fill Quantity: 120,   # refills: 0  Last Office Visit: 2/17/20  Future Office visit:       Routing refill request to provider for review/approval because:  Drug not on the [facility], [facility] or [facility] refill protocol or controlled substance

## 2020-03-17 ENCOUNTER — MYC REFILL (OUTPATIENT)
Dept: FAMILY MEDICINE | Facility: CLINIC | Age: 70
End: 2020-03-17

## 2020-03-17 DIAGNOSIS — M12.9 ARTHROPATHY: ICD-10-CM

## 2020-03-17 RX ORDER — HYDROXYZINE PAMOATE 25 MG/1
25 CAPSULE ORAL 3 TIMES DAILY PRN
Qty: 60 CAPSULE | Refills: 3 | Status: SHIPPED | OUTPATIENT
Start: 2020-03-17 | End: 2020-06-04

## 2020-03-17 NOTE — TELEPHONE ENCOUNTER
This was filled #60 with 3 refills on 3/17/2020.  Closing this encounter.  MELLY GarzonN, RN

## 2020-03-17 NOTE — TELEPHONE ENCOUNTER
"Requested Prescriptions   Pending Prescriptions Disp Refills     hydrOXYzine (VISTARIL) 25 MG capsule 60 capsule 3     Sig: Take 1 capsule (25 mg) by mouth 3 times daily as needed for anxiety       Antihistamines Protocol Passed - 3/16/2020  2:48 PM        Passed - Recent (12 mo) or future (30 days) visit within the authorizing provider's specialty     Patient has had an office visit with the authorizing provider or a provider within the authorizing providers department within the previous 12 mos or has a future within next 30 days. See \"Patient Info\" tab in inbasket, or \"Choose Columns\" in Meds & Orders section of the refill encounter.              Passed - Patient is age 3 or older     Apply age and/or weight-based dosing for peds patients age 3 and older.    Forward request to provider for patients under the age of 3.          Passed - Medication is active on med list             " Yes, record another set of vital signs

## 2020-03-17 NOTE — TELEPHONE ENCOUNTER
hydrOXYzine (VISTARIL) 25 MG capsule      Last Written Prescription Date:  12/06/2019  Last Fill Quantity: 60,   # refills: 3  Last Office Visit: 02/17/2020  Future Office visit:       Routing refill request to provider for review/approval because:  Drug not on the [facility], [facility] or [facility] refill protocol or controlled substance

## 2020-03-18 ENCOUNTER — MYC REFILL (OUTPATIENT)
Dept: FAMILY MEDICINE | Facility: CLINIC | Age: 70
End: 2020-03-18

## 2020-03-18 DIAGNOSIS — B37.0 THRUSH: ICD-10-CM

## 2020-03-18 RX ORDER — FLUCONAZOLE 100 MG/1
100 TABLET ORAL DAILY
Qty: 10 TABLET | Refills: 0 | Status: SHIPPED | OUTPATIENT
Start: 2020-03-18 | End: 2020-04-08

## 2020-03-18 NOTE — TELEPHONE ENCOUNTER
Prescription approved per [facility] Refill Protocol.  Nichol Hernandez RN

## 2020-03-18 NOTE — TELEPHONE ENCOUNTER
"Requested Prescriptions   Pending Prescriptions Disp Refills     fluconazole (DIFLUCAN) 100 MG tablet 10 tablet 0     Sig: Take 1 tablet (100 mg) by mouth daily       Antifungal Agents Failed - 3/18/2020 10:15 AM        Failed - Not Fluconazole or Terconazole      If oral Fluconazole or Terconazole, may refill if indicated in progress notes.           Passed - Recent (12 mo) or future (30 days) visit within the authorizing provider's specialty     Patient has had an office visit with the authorizing provider or a provider within the authorizing providers department within the previous 12 mos or has a future within next 30 days. See \"Patient Info\" tab in inbasket, or \"Choose Columns\" in Meds & Orders section of the refill encounter.              Passed - Medication is active on med list             "

## 2020-03-20 ENCOUNTER — TELEPHONE (OUTPATIENT)
Dept: FAMILY MEDICINE | Facility: CLINIC | Age: 70
End: 2020-03-20

## 2020-03-20 NOTE — TELEPHONE ENCOUNTER
Quan was on the schedule for a Wellness Visit 3/25/20. He has declined to reschedule at this time and has denied needing refills for any medications. Quan will reach out to us should he needs anything.  Jessica Bynum on 3/20/2020 at 11:53 AM

## 2020-04-08 ENCOUNTER — MYC REFILL (OUTPATIENT)
Dept: FAMILY MEDICINE | Facility: CLINIC | Age: 70
End: 2020-04-08

## 2020-04-08 DIAGNOSIS — J32.0 CHRONIC MAXILLARY SINUSITIS: ICD-10-CM

## 2020-04-08 DIAGNOSIS — M12.9 ARTHROPATHY: ICD-10-CM

## 2020-04-08 DIAGNOSIS — B37.0 THRUSH: ICD-10-CM

## 2020-04-08 DIAGNOSIS — S37.019A KIDNEY HEMATOMA, UNSPECIFIED LATERALITY, INITIAL ENCOUNTER: ICD-10-CM

## 2020-04-08 DIAGNOSIS — F41.9 ANXIETY: ICD-10-CM

## 2020-04-08 NOTE — TELEPHONE ENCOUNTER
xanax      Last Written Prescription Date:  4/8/20  Last Fill Quantity: 90,   # refills: 1  Last Office Visit: 2/17/20  Future Office visit:       Routing refill request to provider for review/approval because:  Drug not on the [facility], [facility] or [facility] refill protocol or controlled substance    norco      Last Written Prescription Date:  3/16/20  Last Fill Quantity: 120,   # refills: 0  Last Office Visit: 2/17/20  Future Office visit:       Routing refill request to provider for review/approval because:  Drug not on the [facility], [facility] or [facility] refill protocol or controlled substance

## 2020-04-09 RX ORDER — FLUCONAZOLE 100 MG/1
100 TABLET ORAL DAILY
Qty: 10 TABLET | Refills: 0 | Status: SHIPPED | OUTPATIENT
Start: 2020-04-09 | End: 2020-07-15

## 2020-04-09 RX ORDER — ALPRAZOLAM 0.5 MG
TABLET ORAL
Qty: 90 TABLET | Refills: 1 | OUTPATIENT
Start: 2020-04-09

## 2020-04-09 RX ORDER — HYDROCODONE BITARTRATE AND ACETAMINOPHEN 7.5; 325 MG/1; MG/1
1 TABLET ORAL EVERY 6 HOURS PRN
Qty: 120 TABLET | Refills: 0 | Status: SHIPPED | OUTPATIENT
Start: 2020-04-13 | End: 2020-05-08

## 2020-04-09 NOTE — TELEPHONE ENCOUNTER
Routing refill request to provider for review/approval because:  Drug not on the [facility] refill protocol   MELLY GarzonN, RN

## 2020-04-30 ENCOUNTER — TELEPHONE (OUTPATIENT)
Dept: FAMILY MEDICINE | Facility: CLINIC | Age: 70
End: 2020-04-30

## 2020-04-30 ENCOUNTER — NURSE TRIAGE (OUTPATIENT)
Dept: NURSING | Facility: CLINIC | Age: 70
End: 2020-04-30

## 2020-04-30 DIAGNOSIS — B35.6 TINEA CRURIS: Primary | ICD-10-CM

## 2020-04-30 NOTE — TELEPHONE ENCOUNTER
Reason for call:  Patient reporting a symptom    Symptom or request: yeast infection groin area. Persists, has used ketoconazole, and Fluconazole.     Duration (how long have symptoms been present): ongoing for a month or more     Have you been treated for this before? Yes    Additional comments: asking Dr Hernandez  if there is another type of medication or cream to try. Uses [facility].     Phone Number patient can be reached at:  Home number on file [phone] (home)    Best Time:  Any time    Can we leave a detailed message on this number:  YES    Call taken on 4/30/2020 at 3:51 PM by Kirsten Morris

## 2020-05-01 RX ORDER — ITRACONAZOLE 100 MG/1
200 CAPSULE ORAL DAILY
Qty: 14 CAPSULE | Refills: 0 | Status: SHIPPED | OUTPATIENT
Start: 2020-05-01 | End: 2020-10-23

## 2020-05-01 NOTE — TELEPHONE ENCOUNTER
Per Dr. Hernandez, he wants patient to try sporanox 100 mg, two daily for one one week.  Also, get OTC Lamisil cream and apply twice daily.  Patient has been informed of this.  Rx is pending

## 2020-05-01 NOTE — TELEPHONE ENCOUNTER
Pt called and stated that he made a call to the clinic today and he wants to make sure he didn't miss the callback.      Review of chart indicates the Pt has not had a call back yet.    Writer informed Pt that his DrFortino Has not reviewed the message and it doesn't appear that Pt missed a call.      Writer informed Pt that he will likely hear back from the clinic tomorrow.    Ash Ragland RN on 4/30/2020 at 7:39 PM    Reason for Disposition    General information question, no triage required and triager able to answer question    Protocols used: INFORMATION ONLY CALL-A-AH

## 2020-05-02 ENCOUNTER — TELEPHONE (OUTPATIENT)
Dept: FAMILY MEDICINE | Facility: CLINIC | Age: 70
End: 2020-05-02

## 2020-05-02 NOTE — TELEPHONE ENCOUNTER
Prior Authorization Retail Medication Request    Medication/Dose: Prior Auth Needed Itraconazole 100mg caps  ICD code (if different than what is on RX):  --  Previously Tried and Failed:  --  Rationale:  Prior Auth needed on Itraconazole 100mg caps    Insurance Name:  Medicareblue [phone]  Insurance ID:  [id number]      Pharmacy Information (if different than what is on RX)  Name:  [facility]  Phone:  [phone]    Shelly Ponce, Pharmacy Technician  [facility]  [phone]

## 2020-05-04 NOTE — TELEPHONE ENCOUNTER
Prior Authorization Approval    Authorization Effective Date: 2/4/2020  Authorization Expiration Date: 10/31/2020  Medication: traconazole 100mg caps-APPROVED  Approved Dose/Quantity:   Reference #:     Insurance Company: Medicare Blue - Phone [phone] Fax [phone]  Expected CoPay:       CoPay Card Available:      Foundation Assistance Needed:    Which Pharmacy is filling the prescription (Not needed for infusion/clinic administered): [facility] [address]   Pharmacy Notified: Yes  Patient Notified: No    Pharmacy will notify patient when medication is ready.

## 2020-05-04 NOTE — TELEPHONE ENCOUNTER
Central Prior Authorization Team   Phone: [phone]    PA Initiation    Medication: traconazole 100mg caps-Initiated  Insurance Company: Medicare Blue - Phone [phone] Fax [phone]  Pharmacy Filling the Rx: [address]   Filling Pharmacy Phone: [phone]  Filling Pharmacy Fax:    Start Date: 5/4/2020

## 2020-05-06 ENCOUNTER — TELEPHONE (OUTPATIENT)
Dept: FAMILY MEDICINE | Facility: CLINIC | Age: 70
End: 2020-05-06

## 2020-05-06 NOTE — TELEPHONE ENCOUNTER
Dr Hernandez,    Contacting you regarding interaction of newly prescribed Itraconazole with patient's Alprazolam.  Combination is listed as contraindicated (see below).  Please contact pharmacy to let us know how you would like to proceed.    Thank you,  Anil Welsh PharmD - Float Pharmacist, on behalf of [facility]      Title ALPRAZolam / Itraconazole  Risk Rating X: Avoid combination  Summary Itraconazole may increase the serum concentration of ALPRAZolam. Severity Major Reliability Rating Good   Patient Management Avoid the use of itraconazole and alprazolam in combination. Alprazolam [place] prescribing information specifically lists this combination as contraindicated.  Discussion Alprazolam [place] prescribing information lists its use in combination with itraconazole as contraindicated due to the potential for increased alprazolam exposure and enhanced or prolonged sedative effects.1 In a published clinical study, coadministration of itraconazole (200 mg/day orally for 6 days) increased the AUC of alprazolam (0.8 mg single oral dose) by roughly 2.7 fold in healthy volunteers.1,2 Itraconazole significantly enhanced alprazolam pharmacodynamic effects via several measures (patient reported sleepiness, digit symbol substitution test, several others).    The suspected primary mechanism of this interaction is itraconazole inhibition of CY mediated alprazolam metabolism.

## 2020-05-07 NOTE — TELEPHONE ENCOUNTER
Per Dr. Hernandez he needs to cut back on alprazolam or stop the sporanox.  Pharmacy notified and will let the patient know.

## 2020-07-01 ENCOUNTER — TELEPHONE (OUTPATIENT)
Dept: FAMILY MEDICINE | Facility: CLINIC | Age: 70
End: 2020-07-01

## 2020-07-15 ENCOUNTER — OFFICE VISIT (OUTPATIENT)
Dept: FAMILY MEDICINE | Facility: CLINIC | Age: 70
End: 2020-07-15
Payer: MEDICARE

## 2020-07-15 VITALS
SYSTOLIC BLOOD PRESSURE: 118 MMHG | OXYGEN SATURATION: 96 % | HEART RATE: 74 BPM | DIASTOLIC BLOOD PRESSURE: 74 MMHG | BODY MASS INDEX: 32.62 KG/M2 | WEIGHT: 219 LBS | TEMPERATURE: 97.5 F

## 2020-07-15 DIAGNOSIS — Z00.01 ENCOUNTER FOR GENERAL ADULT MEDICAL EXAMINATION WITH ABNORMAL FINDINGS: Primary | ICD-10-CM

## 2020-07-15 DIAGNOSIS — H60.63 CHRONIC OTITIS EXTERNA OF BOTH EARS, UNSPECIFIED TYPE: ICD-10-CM

## 2020-07-15 DIAGNOSIS — K21.00 GASTROESOPHAGEAL REFLUX DISEASE WITH ESOPHAGITIS: ICD-10-CM

## 2020-07-15 PROCEDURE — G0438 PPPS, INITIAL VISIT: HCPCS | Performed by: FAMILY MEDICINE

## 2020-07-15 PROCEDURE — 99214 OFFICE O/P EST MOD 30 MIN: CPT | Mod: 25 | Performed by: FAMILY MEDICINE

## 2020-07-15 RX ORDER — OMEPRAZOLE 40 MG/1
40 CAPSULE, DELAYED RELEASE ORAL DAILY
Qty: 90 CAPSULE | Refills: 3 | Status: ON HOLD | OUTPATIENT
Start: 2020-07-15 | End: 2023-01-01

## 2020-07-15 RX ORDER — TRIAMCINOLONE ACETONIDE 1 MG/G
CREAM TOPICAL 2 TIMES DAILY
Qty: 30 G | Refills: 1 | Status: SHIPPED | OUTPATIENT
Start: 2020-07-15 | End: 2020-10-23

## 2020-07-15 NOTE — PROGRESS NOTES
"Subjective     Quan Murphy is a 69 year old male who presents to clinic today for the following health issues:  Chief Complaint   Patient presents with     Wellness Visit     Gastrophageal Reflux     seen a few months ago still seems to be an issue     Ear Problem     ears are crusty and have drainage       HPI       Annual Wellness Visit    Are you in the first 12 months of your Medicare Part B coverage?  No    Physical Health:    In general, how would you rate your overall physical health? good    Outside of work, how many days during the week do you exercise?2-3 days/week    Outside of work, approximately how many minutes a day do you exercise?greater than 60 minutes    If you drink alcohol do you typically have >3 drinks per day or >7 drinks per week? No    Do you usually eat at least 4 servings of fruit and vegetables a day, include whole grains & fiber and avoid regularly eating high fat or \"junk\" foods? Yes    Do you have any problems taking medications regularly? No    Do you have any side effects from medications? none    Needs assistance for the following daily activities: no assistance needed    Which of the following safety concerns are present in your home?  none identified     Hearing impairment: No    In the past 6 months, have you been bothered by leaking of urine? no    Mental Health:    In general, how would you rate your overall mental or emotional health? good  PHQ-2 Score: 0    Do you feel safe in your environment? Yes    Have you ever done Advance Care Planning? (For example, a Health Directive, POLST, or a discussion with a medical provider or your loved ones about your wishes)? Yes, advance care planning is on file.    Fall risk:  Fallen 2 or more times in the past year?: No  Any fall with injury in the past year?: No    Cognitive Screenin) Repeat 3 items (Leader, Season, Table)    2) Clock draw: NORMAL  3) 3 item recall: Recalls 3 objects  Results: 3 items recalled: COGNITIVE " IMPAIRMENT LESS LIKELY    Mini-CogTM Copyright LUCI Ortiz. Licensed by the author for use in [facility]; reprinted with permission (lou@.[facility]). All rights reserved.      Do you have sleep apnea, excessive snoring or daytime drowsiness?: no    Current providers sharing in care for this patient include:   Patient Care Team:  Jose Hernandez MD as PCP - General  Jose Hernandez MD as Assigned PCP  Preventive Health Recommendations  See your health care provider every year to    Review health changes.     Discuss preventive care.      Review your medicines if your doctor has prescribed any.    Talk with your health care provider about whether you should have a test to screen for prostate cancer (PSA).    Every 3 years, have a diabetes test (fasting glucose). If you are at risk for diabetes, you should have this test more often.    Every 5 years, have a cholesterol test. Have this test more often if you are at risk for high cholesterol or heart disease.     Every 10 years, have a colonoscopy. Or, have a yearly FIT test (stool test). These exams will check for colon cancer.    Talk to with your health care provider about screening for Abdominal Aortic Aneurysm if you have a family history of AAA or have a history of smoking.    Shots:     Get a flu shot each year.     Get a tetanus shot every 10 years.     Talk to your doctor about your pneumonia vaccines. There are now two you should receive - Pneumovax (PPSV 23) and Prevnar (PCV 13).    Talk to your pharmacist about a shingles vaccine.     Talk to your doctor about the hepatitis B vaccine.    Nutrition:     Eat at least 5 servings of fruits and vegetables each day.     Eat whole-grain bread, whole-wheat pasta and brown rice instead of white grains and rice.     Get adequate Calcium and Vitamin D.     Lifestyle    Exercise for at least 150 minutes a week (30 minutes a day, 5 days a week). This will help you control your weight and prevent disease.      Limit alcohol to one drink per day.     No smoking.     Wear sunscreen to prevent skin cancer.     See your dentist every six months for an exam and cleaning.     See your eye doctor every 1 to 2 years to screen for conditions such as glaucoma, macular degeneration and cataracts.    Personalized Prevention Plan  You are due for the preventive services outlined below.  Your care team is available to assist you in scheduling these services.  If you have already completed any of these items, please share that information with your care team to update in your medical record.  Health Maintenance Due   Topic Date Due     PHQ-2  01/01/2020     Annual Wellness Visit  03/15/2020     FALL RISK ASSESSMENT  03/15/2020             Several issues today.  One is crusting in his outer ears.  Itches a bit.  No pain.    Second issue is his GERD.  He was seen and scoped and has a bit of a hernia in the esophagus.  Wants to get back on PPI.  This is helped him.    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, insomnia type     Allergic conjunctivitis     Anxiety     Hyperlipidemia LDL goal <130     Hypertension goal BP (blood pressure) < 140/90     Advanced directives, counseling/discussion     Inguinal hernia     Degenerative arthritis of foot     Hallux rigidus     Coronary atherosclerosis     Arthropathy     Chest pain     Status post insertion of drug-eluting stent into left anterior descending artery for coronary artery disease     S/P ORIF (open reduction internal fixation) fracture     Closed Colles' fracture of right radius with routine healing, subsequent encounter     Syncope     Renal hematoma     Retroperitoneal hematoma     Hiatal hernia     Past Surgical History:    Procedure Laterality Date     ARTHRODESIS FOOT  7/23/2013    Procedure: ARTHRODESIS FOOT;  Great Toe Arthrodesis Left Foot;  Surgeon: Ash Gonzalez DPM;  Location: [facility]     ARTHRODESIS FOOT  6/10/2014    Procedure: ARTHRODESIS FOOT;  Surgeon: Ash Gonzalez DPM;  Location: [facility] OR     C LAPAROSCOPY, SURGICAL PROSTATECTOMY, RETROPUBIC RADICAL, W/NERVE SPARING  11/30/2004    With full bilateral pelvic lymphadenectomy.  F-[facility].     C TOTAL KNEE ARTHROPLASTY  05/01/08    Left knee     COLONOSCOPY  10/7/2013    Procedure: COLONOSCOPY;  Colonoscopy;  Surgeon: Mike Fallon MD;  Location: [facility]     ESOPHAGOSCOPY, GASTROSCOPY, DUODENOSCOPY (EGD), COMBINED N/A 2/12/2020    Procedure: ESOPHAGOGASTRODUODENOSCOPY (EGD);  Surgeon: Sam Escobar MD;  [facility]     EXTRACORPOREAL SHOCK WAVE LITHOTRIPSY (ESWL) Bilateral 10/18/2017    Procedure: EXTRACORPOREAL SHOCK WAVE LITHOTRIPSY (ESWL);  BILATERAL EXTRACORPOREAL SHOCKWAVE LITHOTRIPSY ;  Surgeon: Meir Torres MD;  Location: [facility] OR     HC CORRECT BUNION,SIMPLE  08/11/2005    x3     HC REMV TOE BENIGN BONE LESN  08/11/2005     HERNIORRHAPHY INGUINAL  7/3/2013    Procedure: HERNIORRHAPHY INGUINAL;  Open Repair Inguinal hernia Right with mesh ;  Surgeon: Sam Escobar MD;  Location: [facility]     MOHS MICROGRAPHIC PROCEDURE  08/23/11    ear and chin-[facility]     OPEN REDUCTION INTERNAL FIXATION WRIST Right 7/18/2017    Procedure: OPEN REDUCTION INTERNAL FIXATION WRIST;  Right distal radius open reduction and internal fixation;  Surgeon: Pedro Blanca DO;  Location: [facility]     RECONSTRUCT FOREFOOT WITH METATARSOPHALANGEAL (MTP) FUSION  6/10/2014    Procedure: RECONSTRUCT FOREFOOT WITH METATARSOPHALANGEAL (MTP) FUSION;  Surgeon: Ash Gonzalez DPM;  Location: [facility]     STENT, CORONARY, DEMI       SURGICAL HISTORY OF -   1999/1974    lt knee     SURGICAL HISTORY OF -   10/2004    lithotripsy     SURGICAL HISTORY OF -   11/05     angiogram with stent       Social History     Tobacco Use     Smoking status: Never Smoker     Smokeless tobacco: Never Used   Substance Use Topics     Alcohol use: Yes     Alcohol/week: 8.3 standard drinks     Types: 10 Cans of beer per week     Comment: 2 beers a day      Family History   Problem Relation Age of Onset     Hypertension Father         has had MI      Connective Tissue Disorder Mother         LUPUS     Heart Disease Mother         poor valve-needing replacement         Current Outpatient Medications   Medication Sig Dispense Refill     ALPRAZolam (XANAX) 0.5 MG tablet Take 1 tablet (0.5 mg) by mouth 3 times daily as needed for anxiety TAKE ONE TABLET BY MOUTH THREE TIMES A DAY AS NEEDED FOR ANXIETY 90 tablet 1     aspirin (ASPIRIN ADULT LOW STRENGTH) 81 MG EC tablet Take 1 tablet (81 mg) by mouth daily 90 tablet 3     cetirizine (ZYRTEC) 10 MG tablet Take 10 mg by mouth daily       CIALIS 20 MG tablet Take 20 mg by mouth daily as needed        citalopram (CELEXA) 40 MG tablet Take 1 tablet (40 mg) by mouth daily 30 tablet 10     HYDROcodone-acetaminophen (NORCO) 7.5-325 MG per tablet Take 1 tablet by mouth every 6 hours as needed for moderate to severe pain maximum 4 tablet(s) per day 120 tablet 0     hydrOXYzine (VISTARIL) 25 MG capsule Take 1 capsule (25 mg) by mouth 3 times daily as needed for anxiety 90 capsule 1     itraconazole (SPORANOX) 100 MG capsule Take 2 capsules (200 mg) by mouth daily 14 capsule 0     ketoconazole (NIZORAL) 2 % external cream Apply topically 2 times daily 60 g 1     lisinopril (PRINIVIL/ZESTRIL) 10 MG tablet Take 1 tablet (10 mg) by mouth daily 90 tablet 3     olopatadine (PATANOL) 0.1 % ophthalmic solution Place 1 drop into both eyes 2 times daily 5 mL 3     omeprazole (PRILOSEC) 40 MG DR capsule Take 1 capsule (40 mg) by mouth daily 90 capsule 3     oxyCODONE (ROXICODONE) 5 MG tablet Take 1 tablet (5 mg) by mouth every 6 hours as needed for pain 12 tablet 0      rosuvastatin (CRESTOR) 40 MG tablet Take 1 tablet (40 mg) by mouth daily 90 tablet 3     triamcinolone (KENALOG) 0.1 % external cream Apply topically 2 times daily 30 g 1     zolpidem (AMBIEN) 10 MG tablet Take 1 tablet (10 mg) by mouth nightly as needed for sleep 30 tablet 5     nitroglycerin (NITROSTAT) 0.4 MG sublingual tablet For chest pain place 1 tablet under the tongue every 5 minutes for 3 doses. If symptoms persist 5 minutes after 1st dose call 911. (Patient not taking: Reported on 7/15/2020) 25 tablet 0       Reviewed and updated as needed this visit by Provider         Review of Systems   Constitutional, HEENT, cardiovascular, pulmonary, GI, , musculoskeletal, neuro, skin, endocrine and psych systems are negative, except as otherwise noted.      Objective    /74   Pulse 74   Temp 97.5  F (36.4  C) (Temporal)   Wt 99.3 kg (219 lb)   SpO2 96%   BMI 32.62 kg/m    Body mass index is 32.62 kg/m .  Physical Exam   GENERAL: healthy, alert and no distress  EYES: Eyes grossly normal to inspection, PERRL and conjunctivae and sclerae normal  HENT: normal cephalic/atraumatic, both ears: normal TM mobility on insufflation and flaking crusting area at the entry of the outer canal both sides., nose and mouth without ulcers or lesions, oropharynx clear and oral mucous membranes moist  NECK: no adenopathy, no asymmetry, masses, or scars and thyroid normal to palpation  RESP: lungs clear to auscultation - no rales, rhonchi or wheezes  CV: regular rate and rhythm, normal S1 S2, no S3 or S4, no murmur, click or rub, no peripheral edema and peripheral pulses strong  ABDOMEN: soft, nontender, no hepatosplenomegaly, no masses and bowel sounds normal  MS: no gross musculoskeletal defects noted, no edema  SKIN: no suspicious lesions or rashes  NEURO: Normal strength and tone, mentation intact and speech normal  PSYCH: mentation appears normal, affect normal/bright    Diagnostic Test Results:  none         Assessment  "& Plan     1. Encounter for general adult medical examination with abnormal findings  Doing better.  Serious problems with allergies, anxiety and sinus.  Chronically takes also diazepam's and opiates for this.    2. Gastroesophageal reflux disease with esophagitis  Recent EGD and consult with chest surgeon.  Recommend he be on omeprazole he feels better when he is on it.  He had gone off for a while.  - omeprazole (PRILOSEC) 40 MG DR capsule; Take 1 capsule (40 mg) by mouth daily  Dispense: 90 capsule; Refill: 3    3. Chronic otitis externa of both ears, unspecified type  We will have him take some triamcinolone cream with a Q-tip and apply it to the entrance of the canals bilaterally.  - triamcinolone (KENALOG) 0.1 % external cream; Apply topically 2 times daily  Dispense: 30 g; Refill: 1     BMI:   Estimated body mass index is 32.62 kg/m  as calculated from the following:    Height as of 2/18/20: 1.745 m (5' 8.7\").    Weight as of this encounter: 99.3 kg (219 lb).   Weight management plan: Discussed healthy diet and exercise guidelines            Return in about 53 weeks (around 7/21/2021) for Annual Wellness Visit.    Jose Hernandez MD  [facility]    "

## 2020-07-17 NOTE — PATIENT INSTRUCTIONS
Patient Education   Personalized Prevention Plan  You are due for the preventive services outlined below.  Your care team is available to assist you in scheduling these services.  If you have already completed any of these items, please share that information with your care team to update in your medical record.  Health Maintenance Due   Topic Date Due     FALL RISK ASSESSMENT  03/15/2020        
73

## 2020-07-31 ENCOUNTER — MYC REFILL (OUTPATIENT)
Dept: FAMILY MEDICINE | Facility: CLINIC | Age: 70
End: 2020-07-31

## 2020-07-31 DIAGNOSIS — M12.9 ARTHROPATHY: ICD-10-CM

## 2020-07-31 RX ORDER — HYDROXYZINE PAMOATE 25 MG/1
25 CAPSULE ORAL 3 TIMES DAILY PRN
Qty: 90 CAPSULE | Refills: 3 | Status: SHIPPED | OUTPATIENT
Start: 2020-07-31 | End: 2020-11-02

## 2020-07-31 NOTE — TELEPHONE ENCOUNTER
Prescription approved per [facility] Refill Protocol.    Anita Damon RN on 7/31/2020 at 1:06 PM

## 2020-08-26 ENCOUNTER — MYC MEDICAL ADVICE (OUTPATIENT)
Dept: FAMILY MEDICINE | Facility: CLINIC | Age: 70
End: 2020-08-26

## 2020-09-13 ENCOUNTER — MYC MEDICAL ADVICE (OUTPATIENT)
Dept: FAMILY MEDICINE | Facility: CLINIC | Age: 70
End: 2020-09-13

## 2020-09-14 ENCOUNTER — VIRTUAL VISIT (OUTPATIENT)
Dept: FAMILY MEDICINE | Facility: CLINIC | Age: 70
End: 2020-09-14
Payer: MEDICARE

## 2020-09-14 DIAGNOSIS — H10.13 ALLERGIC CONJUNCTIVITIS, BILATERAL: Primary | ICD-10-CM

## 2020-09-14 PROCEDURE — 99213 OFFICE O/P EST LOW 20 MIN: CPT | Mod: 95 | Performed by: PHYSICIAN ASSISTANT

## 2020-09-14 RX ORDER — OLOPATADINE HYDROCHLORIDE 1 MG/ML
1 SOLUTION/ DROPS OPHTHALMIC 2 TIMES DAILY
Qty: 5 ML | Refills: 0 | Status: SHIPPED | OUTPATIENT
Start: 2020-09-14 | End: 2021-03-04

## 2020-09-14 RX ORDER — AZELASTINE HYDROCHLORIDE 0.5 MG/ML
1 SOLUTION/ DROPS OPHTHALMIC 2 TIMES DAILY
Qty: 6 ML | Refills: 0 | Status: SHIPPED | OUTPATIENT
Start: 2020-09-14 | End: 2021-03-04

## 2020-09-14 ASSESSMENT — ENCOUNTER SYMPTOMS
EYE REDNESS: 1
FATIGUE: 0
RHINORRHEA: 0
SORE THROAT: 0
PHOTOPHOBIA: 0
EYE ITCHING: 1
EYE PAIN: 0
SINUS PRESSURE: 0
HEADACHES: 0
FEVER: 0
SINUS PAIN: 0
EYE DISCHARGE: 0
COUGH: 0

## 2020-09-14 NOTE — PROGRESS NOTES
"Quan Murphy is a 70 year old male who is being evaluated via a billable telephone visit.      The patient has been notified of following:     \"This telephone visit will be conducted via a call between you and your physician/provider. We have found that certain health care needs can be provided without the need for a physical exam.  This service lets us provide the care you need with a short phone conversation.  If a prescription is necessary we can send it directly to your pharmacy.  If lab work is needed we can place an order for that and you can then stop by our lab to have the test done at a later time.    Telephone visits are billed at different rates depending on your insurance coverage. During this emergency period, for some insurers they may be billed the same as an in-person visit.  Please reach out to your insurance provider with any questions.    If during the course of the call the physician/provider feels a telephone visit is not appropriate, you will not be charged for this service.\"    Patient has given verbal consent for Telephone visit?  Yes    What phone number would you like to be contacted at?     How would you like to obtain your AVS? Pasqualehart    Subjective     Quan Murphy is a 70 year old male who presents via phone visit today for the following health issues:    HPI    Acute Illness  Acute illness concerns: pink eye   Onset/Duration: 10 days   Symptoms:  Fever: no  Chills/Sweats: no  Headache (location?): no  Sinus Pressure: no  Conjunctivitis:  YES, left eye   Ear Pain: no  Rhinorrhea: no  Congestion: no  Sore Throat: no  Cough: no  Wheeze: no  Decreased Appetite: no  Nausea: no  Vomiting: no  Diarrhea: no  Dysuria/Freq.: no  Dysuria or Hematuria: no  Fatigue/Achiness: no  Sick/Strep Exposure: no  Therapies tried and outcome: None    Quan is evaluated via telephone visit today for red itchy eyes. He states his left eye has been red and itchy with some discharge when he wakes up in the " morning for the last 10 days or so. Now symptoms are starting gradually in his right eye too today. He denies changes in vision, light sensitivity and pain. He has not had any discharge throughout the day. He does use sustain eye drops for lubrication regularly. He does have seasonal allergies and has used patanol eye drops in the past but when he turned 66, this was no longer covered by his medicare insurance.     Review of Systems   Constitutional: Negative for fatigue and fever.   HENT: Negative for congestion, ear pain, rhinorrhea, sinus pressure, sinus pain and sore throat.    Eyes: Positive for redness and itching. Negative for photophobia, pain, discharge and visual disturbance.   Respiratory: Negative for cough.    Neurological: Negative for headaches.      Objective        Vitals:  No vitals were obtained today due to virtual visit.    healthy, alert and no distress  PSYCH: Alert and oriented times 3; coherent speech, normal   rate and volume, able to articulate logical thoughts, able   to abstract reason, no tangential thoughts, no hallucinations   or delusions  His affect is normal and pleasant  RESP: No cough, no audible wheezing, able to talk in full sentences  Remainder of exam unable to be completed due to telephone visits      Assessment/Plan:    Assessment & Plan     Allergic conjunctivitis, bilateral  - olopatadine (PATANOL) 0.1 % ophthalmic solution; Place 1 drop into both eyes 2 times daily  - azelastine (OPTIVAR) 0.05 % ophthalmic solution; Apply 1 drop to eye 2 times daily     Patient Instructions   Prescriptions send for Patanol and Azelastine- take whichever is covered by insurance.  If these are too expensive, start over the counter antihistamine eye drop such as Zaditor or Alaway (ketotifen) every 8-12 hours as directed on box    Cool packs are soothing and may help reduce swelling.  Please follow up with primary care provider if not improving, worsening or new symptoms or for any adverse  reactions to medications.       No follow-ups on file.    Tish James PA-C  [facility]    Phone call duration:  9 minutes

## 2020-09-14 NOTE — PATIENT INSTRUCTIONS
Prescriptions send for Patanol and Azelastine- take whichever is covered by insurance.  If these are too expensive, start over the counter antihistamine eye drop such as Zaditor or Alaway (ketotifen) every 8-12 hours as directed on box    Cool packs are soothing and may help reduce swelling.  Please follow up with primary care provider if not improving, worsening or new symptoms or for any adverse reactions to medications.

## 2020-09-14 NOTE — TELEPHONE ENCOUNTER
Left message for patient to return call.  Patient can be put on [facility] schedule.     Dipti Hilton MA 9/14/2020

## 2020-09-21 ENCOUNTER — IMMUNIZATION (OUTPATIENT)
Dept: FAMILY MEDICINE | Facility: CLINIC | Age: 70
End: 2020-09-21
Payer: MEDICARE

## 2020-09-21 DIAGNOSIS — Z23 NEED FOR PROPHYLACTIC VACCINATION AND INOCULATION AGAINST INFLUENZA: Primary | ICD-10-CM

## 2020-09-21 PROCEDURE — 90662 IIV NO PRSV INCREASED AG IM: CPT

## 2020-09-21 PROCEDURE — 99207 ZZC NO CHARGE NURSE ONLY: CPT

## 2020-09-21 PROCEDURE — G0008 ADMIN INFLUENZA VIRUS VAC: HCPCS

## 2020-09-25 ENCOUNTER — MYC MEDICAL ADVICE (OUTPATIENT)
Dept: CARDIOLOGY | Facility: CLINIC | Age: 70
End: 2020-09-25

## 2020-09-25 NOTE — TELEPHONE ENCOUNTER
Messaged pt in [facility] regarding appts. He doesn't have anything scheduled at this time. Sent blood pressures and med list to Dr. Ch in the meantime.  Judy Ramírez RN on 9/25/2020 at 5:21 PM      Current Outpatient Medications   Medication     [START ON 9/29/2020] ALPRAZolam (XANAX) 0.5 MG tablet     aspirin (ASPIRIN ADULT LOW STRENGTH) 81 MG EC tablet     azelastine (OPTIVAR) 0.05 % ophthalmic solution     cetirizine (ZYRTEC) 10 MG tablet     CIALIS 20 MG tablet     citalopram (CELEXA) 40 MG tablet     [START ON 9/29/2020] HYDROcodone-acetaminophen (NORCO) 7.5-325 MG per tablet     hydrOXYzine (VISTARIL) 25 MG capsule     itraconazole (SPORANOX) 100 MG capsule     ketoconazole (NIZORAL) 2 % external cream     lisinopril (PRINIVIL/ZESTRIL) 10 MG tablet     nitroglycerin (NITROSTAT) 0.4 MG sublingual tablet     olopatadine (PATANOL) 0.1 % ophthalmic solution     omeprazole (PRILOSEC) 40 MG DR capsule     oxyCODONE (ROXICODONE) 5 MG tablet     rosuvastatin (CRESTOR) 40 MG tablet     triamcinolone (KENALOG) 0.1 % external cream     zolpidem (AMBIEN) 10 MG tablet     No current facility-administered medications for this visit.          Quan Murphy, Aramis Braswell MD 5 hours ago (12:11 PM)          Dr. Ch,     I am scheduled to see you in November, with lab draws scheduled for sometime before November 6th.  Will that still happen with all that is going on with Covid 19?  One concern I have is that lately my blood pressure is running 140 to 150 over 85 to 100.  This have been morning readings.  Should I increase my meds?  Right now you have me on 10 mg of lisiniplil per day.  I am retired now so I can see you sooner if you prefer.     Thank you,  Quan Murphy ([birth year])

## 2020-09-30 ENCOUNTER — MYC REFILL (OUTPATIENT)
Dept: FAMILY MEDICINE | Facility: CLINIC | Age: 70
End: 2020-09-30

## 2020-09-30 DIAGNOSIS — J01.00 ACUTE MAXILLARY SINUSITIS, RECURRENCE NOT SPECIFIED: Primary | ICD-10-CM

## 2020-09-30 DIAGNOSIS — I25.110 ATHEROSCLEROSIS OF NATIVE CORONARY ARTERY OF NATIVE HEART WITH UNSTABLE ANGINA PECTORIS (H): ICD-10-CM

## 2020-09-30 NOTE — TELEPHONE ENCOUNTER
Routing refill request to provider for review/approval because:   Pt is not on erectile dysfunction medications     Last Written Prescription Date:  1/15/17  Last Fill Quantity: 25,  # refills: 0   Last office visit: 7/15/2020 with prescribing provider:     Future Office Visit:   Next 5 appointments (look out 90 days)    Oct 20, 2020  2:30 PM CDT  Return Visit with Aramis Ch MD  [facility] ([facility]) [address] [place] [id number] [phone]         Christa Chapman, MELLYN, RN

## 2020-10-02 RX ORDER — NITROGLYCERIN 0.4 MG/1
TABLET SUBLINGUAL
Qty: 25 TABLET | Refills: 0 | Status: SHIPPED | OUTPATIENT
Start: 2020-10-02

## 2020-10-05 NOTE — TELEPHONE ENCOUNTER
I agree that we need to adjust the BP meds. Let's increase the lisinopril to 20 mg daily and continue checking BP's. We will make sure to do a BMP with the labs. EE

## 2020-10-07 ENCOUNTER — MYC MEDICAL ADVICE (OUTPATIENT)
Dept: CARDIOLOGY | Facility: CLINIC | Age: 70
End: 2020-10-07

## 2020-10-08 ENCOUNTER — MYC MEDICAL ADVICE (OUTPATIENT)
Dept: CARDIOLOGY | Facility: CLINIC | Age: 70
End: 2020-10-08

## 2020-10-08 DIAGNOSIS — I10 BENIGN ESSENTIAL HYPERTENSION: ICD-10-CM

## 2020-10-08 RX ORDER — LISINOPRIL 20 MG/1
20 TABLET ORAL DAILY
Qty: 90 TABLET | Refills: 3 | Status: SHIPPED | OUTPATIENT
Start: 2020-10-08 | End: 2020-10-20

## 2020-10-13 DIAGNOSIS — E29.1 TESTICULAR HYPOFUNCTION: Primary | ICD-10-CM

## 2020-10-13 LAB — PSA SERPL-MCNC: 0.14 UG/L (ref 0–4)

## 2020-10-13 PROCEDURE — 36415 COLL VENOUS BLD VENIPUNCTURE: CPT | Performed by: UROLOGY

## 2020-10-13 PROCEDURE — 84153 ASSAY OF PSA TOTAL: CPT | Performed by: UROLOGY

## 2020-10-14 ENCOUNTER — TRANSFERRED RECORDS (OUTPATIENT)
Dept: HEALTH INFORMATION MANAGEMENT | Facility: CLINIC | Age: 70
End: 2020-10-14

## 2020-10-15 LAB — TESTOST SERPL-MCNC: 484 NG/DL (ref 240–950)

## 2020-10-18 DIAGNOSIS — I10 BENIGN ESSENTIAL HYPERTENSION: ICD-10-CM

## 2020-10-18 DIAGNOSIS — E78.5 HYPERLIPIDEMIA LDL GOAL <70: ICD-10-CM

## 2020-10-18 LAB
ALT SERPL W P-5'-P-CCNC: 33 U/L (ref 0–70)
ANION GAP SERPL CALCULATED.3IONS-SCNC: 5 MMOL/L (ref 3–14)
BUN SERPL-MCNC: 16 MG/DL (ref 7–30)
CALCIUM SERPL-MCNC: 9 MG/DL (ref 8.5–10.1)
CHLORIDE SERPL-SCNC: 108 MMOL/L (ref 94–109)
CHOLEST SERPL-MCNC: 128 MG/DL
CO2 SERPL-SCNC: 29 MMOL/L (ref 20–32)
CREAT SERPL-MCNC: 1.31 MG/DL (ref 0.66–1.25)
GFR SERPL CREATININE-BSD FRML MDRD: 55 ML/MIN/{1.73_M2}
GLUCOSE SERPL-MCNC: 88 MG/DL (ref 70–99)
HDLC SERPL-MCNC: 66 MG/DL
LDLC SERPL CALC-MCNC: 32 MG/DL
NONHDLC SERPL-MCNC: 62 MG/DL
POTASSIUM SERPL-SCNC: 3.8 MMOL/L (ref 3.4–5.3)
SODIUM SERPL-SCNC: 142 MMOL/L (ref 133–144)
TRIGL SERPL-MCNC: 149 MG/DL

## 2020-10-18 PROCEDURE — 80048 BASIC METABOLIC PNL TOTAL CA: CPT | Performed by: INTERNAL MEDICINE

## 2020-10-18 PROCEDURE — 84460 ALANINE AMINO (ALT) (SGPT): CPT | Performed by: INTERNAL MEDICINE

## 2020-10-18 PROCEDURE — 80061 LIPID PANEL: CPT | Performed by: INTERNAL MEDICINE

## 2020-10-18 PROCEDURE — 36415 COLL VENOUS BLD VENIPUNCTURE: CPT | Performed by: INTERNAL MEDICINE

## 2020-10-20 ENCOUNTER — OFFICE VISIT (OUTPATIENT)
Dept: CARDIOLOGY | Facility: CLINIC | Age: 70
End: 2020-10-20
Attending: INTERNAL MEDICINE
Payer: MEDICARE

## 2020-10-20 VITALS
BODY MASS INDEX: 32.33 KG/M2 | WEIGHT: 218.3 LBS | HEART RATE: 72 BPM | DIASTOLIC BLOOD PRESSURE: 86 MMHG | SYSTOLIC BLOOD PRESSURE: 136 MMHG | OXYGEN SATURATION: 94 % | HEIGHT: 69 IN

## 2020-10-20 DIAGNOSIS — I25.750 CORONARY ARTERY DISEASE INVOLVING NATIVE ARTERY OF TRANSPLANTED HEART WITH UNSTABLE ANGINA PECTORIS (H): Primary | ICD-10-CM

## 2020-10-20 DIAGNOSIS — I10 BENIGN ESSENTIAL HYPERTENSION: ICD-10-CM

## 2020-10-20 DIAGNOSIS — E78.5 HYPERLIPIDEMIA LDL GOAL <130: ICD-10-CM

## 2020-10-20 PROCEDURE — 99214 OFFICE O/P EST MOD 30 MIN: CPT | Performed by: INTERNAL MEDICINE

## 2020-10-20 RX ORDER — METOPROLOL SUCCINATE 25 MG/1
25 TABLET, EXTENDED RELEASE ORAL DAILY
Qty: 90 TABLET | Refills: 3 | Status: SHIPPED | OUTPATIENT
Start: 2020-10-20 | End: 2020-11-03

## 2020-10-20 RX ORDER — LISINOPRIL 10 MG/1
10 TABLET ORAL DAILY
Qty: 90 TABLET | Refills: 3 | Status: SHIPPED | OUTPATIENT
Start: 2020-10-20 | End: 2020-11-03

## 2020-10-20 ASSESSMENT — MIFFLIN-ST. JEOR: SCORE: 1740.81

## 2020-10-20 NOTE — LETTER
10/20/2020      Jose Hernandez MD  [address] [facility] [address]      RE: Quan Murphy       Dear Colleague,    I had the pleasure of seeing Quan Murphy in the [facility].    Service Date: 10/20/2020      HISTORY OF PRESENT ILLNESS:  I had the opportunity to see Mr. Quan Murphy in Cardiology Clinic today at [facility] in [place] for reevaluation of coronary artery disease, hypertension and dyslipidemia.        Mr. Murphy is a 70-year-old gentleman who had left shoulder and left arm discomfort in 12/2016 and was transferred down to [facility] for evaluation.  He was found to have a severe proximal LAD stenosis treated with angioplasty and stenting.  His left ventricular function was normal.  He was initially on metoprolol and lisinopril.  Metoprolol was discontinued due to low blood pressures.  He has hypercholesterolemia treated well with Crestor.      He continues to do well without any recurrent symptoms of chest, shoulder or arm discomfort.  His breathing is good and he has no lightheadedness or dizziness.  He was recently evaluated in the emergency room for some balance problems, but there was no specific problem identified.  I increased his lisinopril last year for treatment of elevated blood pressures.  This year, his blood pressures at home have continued to be elevated in the 140s and 150s and his creatinine is up from 1.1 to 1.3.  His cholesterol numbers are excellent.      PHYSICAL EXAMINATION:  His blood pressure is a little better at 136/86, heart rate 72 and weight 218 pounds.  His lungs are clear.  His heart rhythm is regular.  He has no cardiac murmurs or carotid bruits.      IMPRESSIONS:  Mr. Quan Murphy is a 70-year-old gentleman with hypertension, dyslipidemia and coronary artery disease including proximal LAD stenosis treated in 2016 with coronary stenting.      I am glad that he is doing well and  has no recurrent coronary artery disease symptoms.  However, I am concerned that his creatinine is up slightly.  This may have been due simply to some dehydration on the day of testing, but I suggested we decrease his lisinopril to 10 mg a day and add metoprolol XL 25 mg a day for additional blood pressure control.  I will recheck his creatinine in 2 weeks.  His wife is a nurse and will continue checking his blood pressures at home.  If his blood pressures are elevated above 140/90, I would add additional metoprolol 25 mg for a total of 50 mg daily.  If his creatinine continues to be elevated, I would stop his lisinopril altogether and increase his metoprolol at that point.      I will plan to see him back again in 1 year for reevaluation.      cc:      [place] [address]         AIDEN ALEXANDRE MD, [facility]             D: 10/20/2020   T: 10/20/2020   MT:       Name:     CHUCHO MONTOYA   MRN:      [id number]        Account:      [id number]   :      1950           Service Date: 10/20/2020      Document: [id number]        Outpatient Encounter Medications as of 10/20/2020   Medication Sig Dispense Refill     ALPRAZolam (XANAX) 0.5 MG tablet Take 1 tablet (0.5 mg) by mouth 3 times daily as needed for anxiety TAKE ONE TABLET BY MOUTH THREE TIMES A DAY AS NEEDED FOR ANXIETY 90 tablet 1     aspirin (ASPIRIN ADULT LOW STRENGTH) 81 MG EC tablet Take 1 tablet (81 mg) by mouth daily 90 tablet 3     azelastine (OPTIVAR) 0.05 % ophthalmic solution Apply 1 drop to eye 2 times daily 6 mL 0     citalopram (CELEXA) 40 MG tablet Take 1 tablet (40 mg) by mouth daily 30 tablet 10     HYDROcodone-acetaminophen (NORCO) 7.5-325 MG per tablet Take 1 tablet by mouth every 6 hours as needed for moderate to severe pain maximum 4 tablet(s) per day 120 tablet 0     hydrOXYzine (VISTARIL) 25 MG capsule Take 1 capsule (25 mg) by mouth 3 times daily as needed for  anxiety 90 capsule 3     itraconazole (SPORANOX) 100 MG capsule Take 2 capsules (200 mg) by mouth daily 14 capsule 0     ketoconazole (NIZORAL) 2 % external cream Apply topically 2 times daily 60 g 1     lisinopril (ZESTRIL) 10 MG tablet Take 1 tablet (10 mg) by mouth daily 90 tablet 3     metoprolol succinate ER (TOPROL-XL) 25 MG 24 hr tablet Take 1 tablet (25 mg) by mouth daily 90 tablet 3     omeprazole (PRILOSEC) 40 MG DR capsule Take 1 capsule (40 mg) by mouth daily 90 capsule 3     rosuvastatin (CRESTOR) 40 MG tablet Take 1 tablet (40 mg) by mouth daily 90 tablet 3     triamcinolone (KENALOG) 0.1 % external cream Apply topically 2 times daily 30 g 1     zolpidem (AMBIEN) 10 MG tablet Take 1 tablet (10 mg) by mouth nightly as needed for sleep 30 tablet 5     cetirizine (ZYRTEC) 10 MG tablet Take 10 mg by mouth daily       CIALIS 20 MG tablet Take 20 mg by mouth daily as needed        nitroGLYcerin (NITROSTAT) 0.4 MG sublingual tablet For chest pain place 1 tablet under the tongue every 5 minutes for 3 doses. If symptoms persist 5 minutes after 1st dose call 911. (Patient not taking: Reported on 10/20/2020) 25 tablet 0     olopatadine (PATANOL) 0.1 % ophthalmic solution Place 1 drop into both eyes 2 times daily (Patient not taking: Reported on 10/20/2020) 5 mL 0     oxyCODONE (ROXICODONE) 5 MG tablet Take 1 tablet (5 mg) by mouth every 6 hours as needed for pain (Patient not taking: Reported on 10/20/2020) 12 tablet 0     [DISCONTINUED] lisinopril (ZESTRIL) 20 MG tablet Take 1 tablet (20 mg) by mouth daily 90 tablet 3     No facility-administered encounter medications on file as of 10/20/2020.        Again, thank you for allowing me to participate in the care of your patient.      Sincerely,    AIDEN ALEXANDRE MD     [facility]

## 2020-10-20 NOTE — PROGRESS NOTES
Service Date: 10/20/2020      HISTORY OF PRESENT ILLNESS:  I had the opportunity to see Mr. Quan Murphy in Cardiology Clinic today at [facility] in [place] for reevaluation of coronary artery disease, hypertension and dyslipidemia.        Mr. Murphy is a 70-year-old gentleman who had left shoulder and left arm discomfort in 12/2016 and was transferred down to [facility] for evaluation.  He was found to have a severe proximal LAD stenosis treated with angioplasty and stenting.  His left ventricular function was normal.  He was initially on metoprolol and lisinopril.  Metoprolol was discontinued due to low blood pressures.  He has hypercholesterolemia treated well with Crestor.      He continues to do well without any recurrent symptoms of chest, shoulder or arm discomfort.  His breathing is good and he has no lightheadedness or dizziness.  He was recently evaluated in the emergency room for some balance problems, but there was no specific problem identified.  I increased his lisinopril last year for treatment of elevated blood pressures.  This year, his blood pressures at home have continued to be elevated in the 140s and 150s and his creatinine is up from 1.1 to 1.3.  His cholesterol numbers are excellent.      PHYSICAL EXAMINATION:  His blood pressure is a little better at 136/86, heart rate 72 and weight 218 pounds.  His lungs are clear.  His heart rhythm is regular.  He has no cardiac murmurs or carotid bruits.      IMPRESSIONS:  Mr. Quan Murphy is a 70-year-old gentleman with hypertension, dyslipidemia and coronary artery disease including proximal LAD stenosis treated in 2016 with coronary stenting.      I am glad that he is doing well and has no recurrent coronary artery disease symptoms.  However, I am concerned that his creatinine is up slightly.  This may have been due simply to some dehydration on the day of testing, but I suggested we decrease his lisinopril to 10  mg a day and add metoprolol XL 25 mg a day for additional blood pressure control.  I will recheck his creatinine in 2 weeks.  His wife is a nurse and will continue checking his blood pressures at home.  If his blood pressures are elevated above 140/90, I would add additional metoprolol 25 mg for a total of 50 mg daily.  If his creatinine continues to be elevated, I would stop his lisinopril altogether and increase his metoprolol at that point.      I will plan to see him back again in 1 year for reevaluation.      cc:      [place] [address]         AIDEN ALEXANDRE MD, [facility]             D: 10/20/2020   T: 10/20/2020   MT: JOVAN      Name:     CHUCHO MONTOYA   MRN:      [id number]        Account:      [id number]   :      1950           Service Date: 10/20/2020      Document: [id number]

## 2020-10-20 NOTE — PROGRESS NOTES
HPI and Plan:   See dictation    Orders Placed This Encounter   Procedures     Basic metabolic panel     Lipid Profile     ALT     Basic metabolic panel     Follow-Up with Cardiologist       Orders Placed This Encounter   Medications     lisinopril (ZESTRIL) 10 MG tablet     Sig: Take 1 tablet (10 mg) by mouth daily     Dispense:  90 tablet     Refill:  3     metoprolol succinate ER (TOPROL-XL) 25 MG 24 hr tablet     Sig: Take 1 tablet (25 mg) by mouth daily     Dispense:  90 tablet     Refill:  3       Medications Discontinued During This Encounter   Medication Reason     lisinopril (ZESTRIL) 20 MG tablet          Encounter Diagnoses   Name Primary?     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) Yes     Benign essential hypertension      Hyperlipidemia LDL goal <130        CURRENT MEDICATIONS:  Current Outpatient Medications   Medication Sig Dispense Refill     ALPRAZolam (XANAX) 0.5 MG tablet Take 1 tablet (0.5 mg) by mouth 3 times daily as needed for anxiety TAKE ONE TABLET BY MOUTH THREE TIMES A DAY AS NEEDED FOR ANXIETY 90 tablet 1     aspirin (ASPIRIN ADULT LOW STRENGTH) 81 MG EC tablet Take 1 tablet (81 mg) by mouth daily 90 tablet 3     azelastine (OPTIVAR) 0.05 % ophthalmic solution Apply 1 drop to eye 2 times daily 6 mL 0     citalopram (CELEXA) 40 MG tablet Take 1 tablet (40 mg) by mouth daily 30 tablet 10     HYDROcodone-acetaminophen (NORCO) 7.5-325 MG per tablet Take 1 tablet by mouth every 6 hours as needed for moderate to severe pain maximum 4 tablet(s) per day 120 tablet 0     hydrOXYzine (VISTARIL) 25 MG capsule Take 1 capsule (25 mg) by mouth 3 times daily as needed for anxiety 90 capsule 3     itraconazole (SPORANOX) 100 MG capsule Take 2 capsules (200 mg) by mouth daily 14 capsule 0     ketoconazole (NIZORAL) 2 % external cream Apply topically 2 times daily 60 g 1     lisinopril (ZESTRIL) 10 MG tablet Take 1 tablet (10 mg) by mouth  daily 90 tablet 3     metoprolol succinate ER (TOPROL-XL) 25 MG 24 hr tablet Take 1 tablet (25 mg) by mouth daily 90 tablet 3     omeprazole (PRILOSEC) 40 MG DR capsule Take 1 capsule (40 mg) by mouth daily 90 capsule 3     rosuvastatin (CRESTOR) 40 MG tablet Take 1 tablet (40 mg) by mouth daily 90 tablet 3     triamcinolone (KENALOG) 0.1 % external cream Apply topically 2 times daily 30 g 1     zolpidem (AMBIEN) 10 MG tablet Take 1 tablet (10 mg) by mouth nightly as needed for sleep 30 tablet 5     cetirizine (ZYRTEC) 10 MG tablet Take 10 mg by mouth daily       CIALIS 20 MG tablet Take 20 mg by mouth daily as needed        nitroGLYcerin (NITROSTAT) 0.4 MG sublingual tablet For chest pain place 1 tablet under the tongue every 5 minutes for 3 doses. If symptoms persist 5 minutes after 1st dose call 911. (Patient not taking: Reported on 10/20/2020) 25 tablet 0     olopatadine (PATANOL) 0.1 % ophthalmic solution Place 1 drop into both eyes 2 times daily (Patient not taking: Reported on 10/20/2020) 5 mL 0     oxyCODONE (ROXICODONE) 5 MG tablet Take 1 tablet (5 mg) by mouth every 6 hours as needed for pain (Patient not taking: Reported on 10/20/2020) 12 tablet 0       ALLERGIES     Allergies   Allergen Reactions     Animal Dander      Azithromycin Nausea and Vomiting     Dust Mites      Pollen Extract      Smoke.        PAST MEDICAL HISTORY:  Past Medical History:   Diagnosis Date     Allergy, unspecified not elsewhere classified     Seasonal allergies, pollen, dust, smoke and animals     Antiplatelet or antithrombotic long-term use      Anxiety      Arthritis      Chest pain      Chronic sinusitis      Coronary atherosclerosis of unspecified type of vessel, native or graft     Coronary artery disease     Hyperlipidemia      Hypertension      Inguinal hernia      Kidney stones      Malignant neoplasm of prostate (H)     Prostate cancer     Prostate cancer (H)        PAST SURGICAL HISTORY:  Past Surgical History:    Procedure Laterality Date     ARTHRODESIS FOOT  7/23/2013    Procedure: ARTHRODESIS FOOT;  Great Toe Arthrodesis Left Foot;  Surgeon: Ash Gonzalez DPM;  Location: [facility] OR     ARTHRODESIS FOOT  6/10/2014    Procedure: ARTHRODESIS FOOT;  Surgeon: Ash Gonzalez DPM;  Location: [facility] OR     C LAPAROSCOPY, SURGICAL PROSTATECTOMY, RETROPUBIC RADICAL, W/NERVE SPARING  11/30/2004    With full bilateral pelvic lymphadenectomy.  F-[facility].     C TOTAL KNEE ARTHROPLASTY  05/01/08    Left knee     COLONOSCOPY  10/7/2013    Procedure: COLONOSCOPY;  Colonoscopy;  Surgeon: Mike Fallon MD;  Location: [facility]     ESOPHAGOSCOPY, GASTROSCOPY, DUODENOSCOPY (EGD), COMBINED N/A 2/12/2020    Procedure: ESOPHAGOGASTRODUODENOSCOPY (EGD);  Surgeon: Sam Escobar MD;  Location: [facility]     EXTRACORPOREAL SHOCK WAVE LITHOTRIPSY (ESWL) Bilateral 10/18/2017    Procedure: EXTRACORPOREAL SHOCK WAVE LITHOTRIPSY (ESWL);  BILATERAL EXTRACORPOREAL SHOCKWAVE LITHOTRIPSY ;  Surgeon: Meir Torres MD;  Location: [facility] OR     HC CORRECT BUNION,SIMPLE  08/11/2005    x3     HC REMV TOE BENIGN BONE LESN  08/11/2005     HERNIORRHAPHY INGUINAL  7/3/2013    Procedure: HERNIORRHAPHY INGUINAL;  Open Repair Inguinal hernia Right with mesh ;  Surgeon: Sam Escobar MD;  Location: [facility] OR     MOHS MICROGRAPHIC PROCEDURE  08/23/11    ear and chin-[facility]     OPEN REDUCTION INTERNAL FIXATION WRIST Right 7/18/2017    Procedure: OPEN REDUCTION INTERNAL FIXATION WRIST;  Right distal radius open reduction and internal fixation;  Surgeon: Pedro Blanca DO;  Location: [facility]     RECONSTRUCT FOREFOOT WITH METATARSOPHALANGEAL (MTP) FUSION  6/10/2014    Procedure: RECONSTRUCT FOREFOOT WITH METATARSOPHALANGEAL (MTP) FUSION;  Surgeon: Ash Gonzalez DPM;  Location: [facility]     STENT, CORONARY, DEMI       SURGICAL HISTORY OF -   1999/1974    lt knee     SURGICAL HISTORY OF -   10/2004    lithotripsy     SURGICAL HISTORY OF -   11/05     angiogram with stent       FAMILY HISTORY:  Family History   Problem Relation Age of Onset     Hypertension Father         has had MI      Connective Tissue Disorder Mother         LUPUS     Heart Disease Mother         poor valve-needing replacement       SOCIAL HISTORY:  Social History     Socioeconomic History     Marital status:      Spouse name: Alessandra     Number of children: 2     Years of education: Not on file     Highest education level: Not on file   Occupational History     Not on file   Social Needs     Financial resource strain: Not on file     Food insecurity     Worry: Not on file     Inability: Not on file     Transportation needs     Medical: Not on file     Non-medical: Not on file   Tobacco Use     Smoking status: Never Smoker     Smokeless tobacco: Never Used   Substance and Sexual Activity     Alcohol use: Yes     Alcohol/week: 8.3 standard drinks     Types: 10 Cans of beer per week     Comment: 2 beers a day      Drug use: No     Sexual activity: Yes     Partners: Female   Lifestyle     Physical activity     Days per week: Not on file     Minutes per session: Not on file     Stress: Not on file   Relationships     Social connections     Talks on phone: Not on file     Gets together: Not on file     Attends [employer] service: Not on file     Active member of club or organization: Not on file     Attends meetings of clubs or organizations: Not on file     Relationship status: Not on file     Intimate partner violence     Fear of current or ex partner: Not on file     Emotionally abused: Not on file     Physically abused: Not on file     Forced sexual activity: Not on file   Other Topics Concern      Service Not Asked     Blood Transfusions Not Asked     Caffeine Concern Not Asked     Occupational Exposure Not Asked     Hobby Hazards Not Asked     Sleep Concern Not Asked     Stress Concern Not Asked     Weight Concern Not Asked     Special Diet Not Asked     Back Care Not Asked      "Exercise Not Asked     Bike Helmet Not Asked     Seat Belt Not Asked     Self-Exams Not Asked     Parent/sibling w/ CABG, MI or angioplasty before 65F 55M? Not Asked   Social History Narrative     Not on file       Review of Systems:  Skin:  Negative       Eyes:  Negative      ENT:  Positive for nasal congestion    Respiratory:  Negative       Cardiovascular:  Negative for;palpitations;chest pain;edema Positive for;lightheadedness;dizziness little jitters and dizzy today/ loses balance a little bit  Gastroenterology: Negative      Genitourinary:  Negative      Musculoskeletal:  Negative      Neurologic:  Positive for tremors    Psychiatric:  Positive for anxiety;sleep disturbances    Heme/Lymph/Imm:  Positive for allergies    Endocrine:  Negative        Physical Exam:  Vitals: /86 (BP Location: Left arm, Patient Position: Fowlers, Cuff Size: Adult Large)   Pulse 72   Ht 1.745 m (5' 8.7\")   Wt 99 kg (218 lb 4.8 oz)   SpO2 94%   BMI 32.52 kg/m      Constitutional:  cooperative, alert and oriented, well developed, well nourished, in no acute distress        Skin:  warm and dry to the touch, no apparent skin lesions or masses noted          Head:  normocephalic, no masses or lesions        Eyes:  pupils equal and round        Lymph:No Cervical lymphadenopathy present     ENT:  no pallor or cyanosis, dentition good        Neck:           Respiratory:  clear to auscultation;normal symmetry         Cardiac: regular rhythm;normal S1 and S2     no presence of murmur          pulses full and equal, no bruits auscultated                                   Groin site intact    GI:  abdomen soft;BS normoactive        Extremities and Muscular Skeletal:  no edema;no deformities, clubbing, cyanosis, erythema observed              Neurological:  no gross motor deficits;affect appropriate        Psych:  oriented to time, person and place        CC  Aramis Ch MD  [address]  [place] [id number]              "

## 2020-10-20 NOTE — LETTER
10/20/2020    Jose Hernandez MD  [address] [facility] [id number]    RE: Quan Murphy       Dear Colleague,    I had the pleasure of seeing Quan Murphy in the [facility].    HPI and Plan:   See dictation    Orders Placed This Encounter   Procedures     Basic metabolic panel     Lipid Profile     ALT     Basic metabolic panel     Follow-Up with Cardiologist       Orders Placed This Encounter   Medications     lisinopril (ZESTRIL) 10 MG tablet     Sig: Take 1 tablet (10 mg) by mouth daily     Dispense:  90 tablet     Refill:  3     metoprolol succinate ER (TOPROL-XL) 25 MG 24 hr tablet     Sig: Take 1 tablet (25 mg) by mouth daily     Dispense:  90 tablet     Refill:  3       Medications Discontinued During This Encounter   Medication Reason     lisinopril (ZESTRIL) 20 MG tablet          Encounter Diagnoses   Name Primary?     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) Yes     Benign essential hypertension      Hyperlipidemia LDL goal <130        CURRENT MEDICATIONS:  Current Outpatient Medications   Medication Sig Dispense Refill     ALPRAZolam (XANAX) 0.5 MG tablet Take 1 tablet (0.5 mg) by mouth 3 times daily as needed for anxiety TAKE ONE TABLET BY MOUTH THREE TIMES A DAY AS NEEDED FOR ANXIETY 90 tablet 1     aspirin (ASPIRIN ADULT LOW STRENGTH) 81 MG EC tablet Take 1 tablet (81 mg) by mouth daily 90 tablet 3     azelastine (OPTIVAR) 0.05 % ophthalmic solution Apply 1 drop to eye 2 times daily 6 mL 0     citalopram (CELEXA) 40 MG tablet Take 1 tablet (40 mg) by mouth daily 30 tablet 10     HYDROcodone-acetaminophen (NORCO) 7.5-325 MG per tablet Take 1 tablet by mouth every 6 hours as needed for moderate to severe pain maximum 4 tablet(s) per day 120 tablet 0     hydrOXYzine (VISTARIL) 25 MG capsule Take 1 capsule (25 mg) by mouth 3 times daily as needed for anxiety 90 capsule 3     itraconazole  (SPORANOX) 100 MG capsule Take 2 capsules (200 mg) by mouth daily 14 capsule 0     ketoconazole (NIZORAL) 2 % external cream Apply topically 2 times daily 60 g 1     lisinopril (ZESTRIL) 10 MG tablet Take 1 tablet (10 mg) by mouth daily 90 tablet 3     metoprolol succinate ER (TOPROL-XL) 25 MG 24 hr tablet Take 1 tablet (25 mg) by mouth daily 90 tablet 3     omeprazole (PRILOSEC) 40 MG DR capsule Take 1 capsule (40 mg) by mouth daily 90 capsule 3     rosuvastatin (CRESTOR) 40 MG tablet Take 1 tablet (40 mg) by mouth daily 90 tablet 3     triamcinolone (KENALOG) 0.1 % external cream Apply topically 2 times daily 30 g 1     zolpidem (AMBIEN) 10 MG tablet Take 1 tablet (10 mg) by mouth nightly as needed for sleep 30 tablet 5     cetirizine (ZYRTEC) 10 MG tablet Take 10 mg by mouth daily       CIALIS 20 MG tablet Take 20 mg by mouth daily as needed        nitroGLYcerin (NITROSTAT) 0.4 MG sublingual tablet For chest pain place 1 tablet under the tongue every 5 minutes for 3 doses. If symptoms persist 5 minutes after 1st dose call 911. (Patient not taking: Reported on 10/20/2020) 25 tablet 0     olopatadine (PATANOL) 0.1 % ophthalmic solution Place 1 drop into both eyes 2 times daily (Patient not taking: Reported on 10/20/2020) 5 mL 0     oxyCODONE (ROXICODONE) 5 MG tablet Take 1 tablet (5 mg) by mouth every 6 hours as needed for pain (Patient not taking: Reported on 10/20/2020) 12 tablet 0       ALLERGIES     Allergies   Allergen Reactions     Animal Dander      Azithromycin Nausea and Vomiting     Dust Mites      Pollen Extract      Smoke.        PAST MEDICAL HISTORY:  Past Medical History:   Diagnosis Date     Allergy, unspecified not elsewhere classified     Seasonal allergies, pollen, dust, smoke and animals     Antiplatelet or antithrombotic long-term use      Anxiety      Arthritis      Chest pain      Chronic sinusitis      Coronary atherosclerosis of unspecified type of vessel, native or graft     Coronary artery  disease     Hyperlipidemia      Hypertension      Inguinal hernia      Kidney stones      Malignant neoplasm of prostate (H)     Prostate cancer     Prostate cancer (H)        PAST SURGICAL HISTORY:  Past Surgical History:   Procedure Laterality Date     ARTHRODESIS FOOT  7/23/2013    Procedure: ARTHRODESIS FOOT;  Great Toe Arthrodesis Left Foot;  Surgeon: Ash Gonzalez DPM;  Location: [facility] OR     ARTHRODESIS FOOT  6/10/2014    Procedure: ARTHRODESIS FOOT;  Surgeon: Ash Gonzalez DPM;  Location: [facility] OR     C LAPAROSCOPY, SURGICAL PROSTATECTOMY, RETROPUBIC RADICAL, W/NERVE SPARING  11/30/2004    With full bilateral pelvic lymphadenectomy.  [facility].     C TOTAL KNEE ARTHROPLASTY  05/01/08    Left knee     COLONOSCOPY  10/7/2013    Procedure: COLONOSCOPY;  Colonoscopy;  Surgeon: Mike Fallon MD;  Location:  GI     ESOPHAGOSCOPY, GASTROSCOPY, DUODENOSCOPY (EGD), COMBINED N/A 2/12/2020    Procedure: ESOPHAGOGASTRODUODENOSCOPY (EGD);  Surgeon: Sam Escobar MD;  Location: [facility] GI     EXTRACORPOREAL SHOCK WAVE LITHOTRIPSY (ESWL) Bilateral 10/18/2017    Procedure: EXTRACORPOREAL SHOCK WAVE LITHOTRIPSY (ESWL);  BILATERAL EXTRACORPOREAL SHOCKWAVE LITHOTRIPSY ;  Surgeon: Meir Torres MD;  Location: [facility] OR     HC CORRECT BUNION,SIMPLE  08/11/2005    x3     HC REMV TOE BENIGN BONE LESN  08/11/2005     HERNIORRHAPHY INGUINAL  7/3/2013    Procedure: HERNIORRHAPHY INGUINAL;  Open Repair Inguinal hernia Right with mesh ;  Surgeon: Sam Escobar MD;  Location: [facility] OR     MOHS MICROGRAPHIC PROCEDURE  08/23/11    ear and chin-[facility]     OPEN REDUCTION INTERNAL FIXATION WRIST Right 7/18/2017    Procedure: OPEN REDUCTION INTERNAL FIXATION WRIST;  Right distal radius open reduction and internal fixation;  Surgeon: Pedro Blanca DO;  Location: [facility] OR     RECONSTRUCT FOREFOOT WITH METATARSOPHALANGEAL (MTP) FUSION  6/10/2014    Procedure: RECONSTRUCT FOREFOOT WITH METATARSOPHALANGEAL  (MTP) FUSION;  Surgeon: Ash Gonzalez DPM;  Location: [facility] OR     STENT, CORONARY, DEMI       SURGICAL HISTORY OF -   1999/1974    lt knee     SURGICAL HISTORY OF -   10/2004    lithotripsy     SURGICAL HISTORY OF - 11/05    angiogram with stent       FAMILY HISTORY:  Family History   Problem Relation Age of Onset     Hypertension Father         has had MI      Connective Tissue Disorder Mother         LUPUS     Heart Disease Mother         poor valve-needing replacement       SOCIAL HISTORY:  Social History     Socioeconomic History     Marital status:      Spouse name: Alessandra     Number of children: 2     Years of education: Not on file     Highest education level: Not on file   Occupational History     Not on file   Social Needs     Financial resource strain: Not on file     Food insecurity     Worry: Not on file     Inability: Not on file     Transportation needs     Medical: Not on file     Non-medical: Not on file   Tobacco Use     Smoking status: Never Smoker     Smokeless tobacco: Never Used   Substance and Sexual Activity     Alcohol use: Yes     Alcohol/week: 8.3 standard drinks     Types: 10 Cans of beer per week     Comment: 2 beers a day      Drug use: No     Sexual activity: Yes     Partners: Female   Lifestyle     Physical activity     Days per week: Not on file     Minutes per session: Not on file     Stress: Not on file   Relationships     Social connections     Talks on phone: Not on file     Gets together: Not on file     Attends Muslim service: Not on file     Active member of club or organization: Not on file     Attends meetings of clubs or organizations: Not on file     Relationship status: Not on file     Intimate partner violence     Fear of current or ex partner: Not on file     Emotionally abused: Not on file     Physically abused: Not on file     Forced sexual activity: Not on file   Other Topics Concern      Service Not Asked     Blood Transfusions Not Asked      "Caffeine Concern Not Asked     Occupational Exposure Not Asked     Hobby Hazards Not Asked     Sleep Concern Not Asked     Stress Concern Not Asked     Weight Concern Not Asked     Special Diet Not Asked     Back Care Not Asked     Exercise Not Asked     Bike Helmet Not Asked     Seat Belt Not Asked     Self-Exams Not Asked     Parent/sibling w/ CABG, MI or angioplasty before 65F 55M? Not Asked   Social History Narrative     Not on file       Review of Systems:  Skin:  Negative       Eyes:  Negative      ENT:  Positive for nasal congestion    Respiratory:  Negative       Cardiovascular:  Negative for;palpitations;chest pain;edema Positive for;lightheadedness;dizziness little jitters and dizzy today/ loses balance a little bit  Gastroenterology: Negative      Genitourinary:  Negative      Musculoskeletal:  Negative      Neurologic:  Positive for tremors    Psychiatric:  Positive for anxiety;sleep disturbances    Heme/Lymph/Imm:  Positive for allergies    Endocrine:  Negative        Physical Exam:  Vitals: /86 (BP Location: Left arm, Patient Position: Fowlers, Cuff Size: Adult Large)   Pulse 72   Ht 1.745 m (5' 8.7\")   Wt 99 kg (218 lb 4.8 oz)   SpO2 94%   BMI 32.52 kg/m      Constitutional:  cooperative, alert and oriented, well developed, well nourished, in no acute distress        Skin:  warm and dry to the touch, no apparent skin lesions or masses noted          Head:  normocephalic, no masses or lesions        Eyes:  pupils equal and round        Lymph:No Cervical lymphadenopathy present     ENT:  no pallor or cyanosis, dentition good        Neck:           Respiratory:  clear to auscultation;normal symmetry         Cardiac: regular rhythm;normal S1 and S2     no presence of murmur          pulses full and equal, no bruits auscultated                                   Groin site intact    GI:  abdomen soft;BS normoactive        Extremities and Muscular Skeletal:  no edema;no deformities, clubbing, " cyanosis, erythema observed              Neurological:  no gross motor deficits;affect appropriate        Psych:  oriented to time, person and place        CC  Aramis Ch MD  [address]  [place] [id number]                  Thank you for allowing me to participate in the care of your patient.      Sincerely,     ARAMIS CH MD     [facility]    cc:   Aramis Ch MD  [address]  [place] [id number]

## 2020-10-21 ENCOUNTER — MYC MEDICAL ADVICE (OUTPATIENT)
Dept: FAMILY MEDICINE | Facility: CLINIC | Age: 70
End: 2020-10-21

## 2020-10-23 ENCOUNTER — VIRTUAL VISIT (OUTPATIENT)
Dept: FAMILY MEDICINE | Facility: CLINIC | Age: 70
End: 2020-10-23
Payer: MEDICARE

## 2020-10-23 DIAGNOSIS — F41.9 ANXIETY: ICD-10-CM

## 2020-10-23 PROCEDURE — 99442 PR PHYSICIAN TELEPHONE EVALUATION 11-20 MIN: CPT | Mod: 95 | Performed by: FAMILY MEDICINE

## 2020-10-23 RX ORDER — ALPRAZOLAM 0.5 MG
0.5 TABLET ORAL 4 TIMES DAILY PRN
Qty: 120 TABLET | Refills: 1 | Status: SHIPPED | OUTPATIENT
Start: 2020-10-23 | End: 2020-12-16

## 2020-10-23 RX ORDER — ALPRAZOLAM 0.5 MG
0.5 TABLET ORAL 4 TIMES DAILY PRN
Qty: 120 TABLET | Refills: 1 | Status: SHIPPED | OUTPATIENT
Start: 2020-10-23 | End: 2020-10-23

## 2020-10-23 ASSESSMENT — ANXIETY QUESTIONNAIRES
5. BEING SO RESTLESS THAT IT IS HARD TO SIT STILL: NOT AT ALL
6. BECOMING EASILY ANNOYED OR IRRITABLE: NOT AT ALL
2. NOT BEING ABLE TO STOP OR CONTROL WORRYING: NEARLY EVERY DAY
1. FEELING NERVOUS, ANXIOUS, OR ON EDGE: NEARLY EVERY DAY
GAD7 TOTAL SCORE: 12
3. WORRYING TOO MUCH ABOUT DIFFERENT THINGS: NEARLY EVERY DAY
7. FEELING AFRAID AS IF SOMETHING AWFUL MIGHT HAPPEN: NOT AT ALL
IF YOU CHECKED OFF ANY PROBLEMS ON THIS QUESTIONNAIRE, HOW DIFFICULT HAVE THESE PROBLEMS MADE IT FOR YOU TO DO YOUR WORK, TAKE CARE OF THINGS AT HOME, OR GET ALONG WITH OTHER PEOPLE: NOT DIFFICULT AT ALL

## 2020-10-23 ASSESSMENT — PATIENT HEALTH QUESTIONNAIRE - PHQ9
SUM OF ALL RESPONSES TO PHQ QUESTIONS 1-9: 5
5. POOR APPETITE OR OVEREATING: NEARLY EVERY DAY

## 2020-10-23 NOTE — PROGRESS NOTES
"Quan Murphy is a 70 year old male who is being evaluated via a billable telephone visit.      The patient has been notified of following:     \"This telephone visit will be conducted via a call between you and your physician/provider. We have found that certain health care needs can be provided without the need for a physical exam.  This service lets us provide the care you need with a short phone conversation.  If a prescription is necessary we can send it directly to your pharmacy.  If lab work is needed we can place an order for that and you can then stop by our lab to have the test done at a later time.    Telephone visits are billed at different rates depending on your insurance coverage. During this emergency period, for some insurers they may be billed the same as an in-person visit.  Please reach out to your insurance provider with any questions.    If during the course of the call the physician/provider feels a telephone visit is not appropriate, you will not be charged for this service.\"    Patient has given verbal consent for Telephone visit?  Yes    What phone number would you like to be contacted at? [phone]    How would you like to obtain your AVS? Kash Sanford     Quan Murphy is a 70 year old male who presents via phone visit today for the following health issues: Discussion of his anxiety.  He has been stressed out recently with some issues.  He cannot sleep.  Between Vistaril and Xanax they do help.  He is also on Celexa.  He confides in me that he was drinking too much liquor.  He thinks if he has another 0.5 Xanax a day may help reduce this.  Will allow him to do this.  I told him that he has a combination of the benzodiazepines and the hydrocodone this is concern for respiratory depression.  He is aware of this.    HPI     Anxiety Follow-Up    How are you doing with your anxiety since your last visit? Worsened     Are you having other symptoms that might be associated with " anxiety? No    Have you had a significant life event? No     Are you feeling depressed? Yes:  He is on citalopram    Do you have any concerns with your use of alcohol or other drugs? No    Social History     Tobacco Use     Smoking status: Never Smoker     Smokeless tobacco: Never Used   Substance Use Topics     Alcohol use: Yes     Alcohol/week: 8.3 standard drinks     Types: 10 Cans of beer per week     Comment: 2 beers a day      Drug use: No     AME-7 SCORE 10/23/2020   Total Score 12     No flowsheet data found.  No flowsheet data found.  AME-7  10/23/2020   1. Feeling nervous, anxious, or on edge 3   2. Not being able to stop or control worrying 3   3. Worrying too much about different things 3   4. Trouble relaxing 3   5. Being so restless that it is hard to sit still 0   6. Becoming easily annoyed or irritable 0   7. Feeling afraid, as if something awful might happen 0   AME-7 Total Score 12   If you checked any problems, how difficult have they made it for you to do your work, take care of things at home, or get along with other people? Not difficult at all   PHQ-9 score:  No flowsheet data found.        How many servings of fruits and vegetables do you eat daily?  0-1    On average, how many sweetened beverages do you drink each day (Examples: soda, juice, sweet tea, etc.  Do NOT count diet or artificially sweetened beverages)?   0    How many days per week do you exercise enough to make your heart beat faster? 3 or less    How many minutes a day do you exercise enough to make your heart beat faster? 60 or more    How many days per week do you miss taking your medication? 0             Review of Systems   Constitutional, HEENT, cardiovascular, pulmonary, gi and gu systems are negative, except as otherwise noted.       Objective   Vitals - Patient Reported  Weight (Patient Reported): 98.9 kg (218 lb)  Pain Score: No Pain (0)        healthy, alert and mild distress  PSYCH: Alert and oriented times 3;  coherent speech, normal   rate and volume, able to articulate logical thoughts, able   to abstract reason, no tangential thoughts, no hallucinations   or delusions  His affect is normal and pleasant  RESP: No cough, no audible wheezing, able to talk in full sentences  Remainder of exam unable to be completed due to telephone visits            Assessment/Plan:    Assessment & Plan     Anxiety  Somewhat reluctantly have been alone to increase his Xanax to up to 4 times a day.  He states he needs to take 1 in the middle the night because he wakes up and cannot get back to sleep.  He had been getting into drinking some bourbon and thought it was too much.  He rather use the Xanax.  He is on 40 mg of Celexa he is on hydroxyzine which also helps him he may even take that more a day than 3 times.  Very anxious individual.  Again had a discussion with him and he understands my concerns over the opioids and benzodiazepine use.  He is also taking Ambien at night.  I know he is not abusing these items but he certainly has dependent and has developed some tolerance to them.  - ALPRAZolam (XANAX) 0.5 MG tablet; Take 1 tablet (0.5 mg) by mouth 4 times daily as needed for anxiety        See Patient Instructions    No follow-ups on file.    Jose Hernandez MD  [facility]    Phone call duration:  12 minutes

## 2020-10-24 DIAGNOSIS — S37.019A KIDNEY HEMATOMA, UNSPECIFIED LATERALITY, INITIAL ENCOUNTER: ICD-10-CM

## 2020-10-24 DIAGNOSIS — J32.0 CHRONIC MAXILLARY SINUSITIS: ICD-10-CM

## 2020-10-24 DIAGNOSIS — M12.9 ARTHROPATHY: ICD-10-CM

## 2020-10-24 ASSESSMENT — ANXIETY QUESTIONNAIRES: GAD7 TOTAL SCORE: 12

## 2020-10-26 RX ORDER — HYDROCODONE BITARTRATE AND ACETAMINOPHEN 7.5; 325 MG/1; MG/1
1 TABLET ORAL EVERY 6 HOURS PRN
Qty: 120 TABLET | Refills: 0 | Status: SHIPPED | OUTPATIENT
Start: 2020-10-26 | End: 2020-11-19

## 2020-10-26 NOTE — TELEPHONE ENCOUNTER
[facility]      Last Written Prescription Date:  9/29/2020  Last Fill Quantity: 120,   # refills: 0  Last Office Visit: 10/23/2020  Future Office visit:       Routing refill request to provider for review/approval because:  Drug not on the [facility], [facility] or [facility] refill protocol or controlled substance

## 2020-11-02 ENCOUNTER — MYC MEDICAL ADVICE (OUTPATIENT)
Dept: CARDIOLOGY | Facility: CLINIC | Age: 70
End: 2020-11-02

## 2020-11-02 DIAGNOSIS — I10 BENIGN ESSENTIAL HYPERTENSION: ICD-10-CM

## 2020-11-02 LAB
ANION GAP SERPL CALCULATED.3IONS-SCNC: 5 MMOL/L (ref 3–14)
BUN SERPL-MCNC: 18 MG/DL (ref 7–30)
CALCIUM SERPL-MCNC: 9.4 MG/DL (ref 8.5–10.1)
CHLORIDE SERPL-SCNC: 107 MMOL/L (ref 94–109)
CO2 SERPL-SCNC: 28 MMOL/L (ref 20–32)
CREAT SERPL-MCNC: 1.93 MG/DL (ref 0.66–1.25)
GFR SERPL CREATININE-BSD FRML MDRD: 34 ML/MIN/{1.73_M2}
GLUCOSE SERPL-MCNC: 106 MG/DL (ref 70–99)
POTASSIUM SERPL-SCNC: 4.6 MMOL/L (ref 3.4–5.3)
SODIUM SERPL-SCNC: 140 MMOL/L (ref 133–144)

## 2020-11-02 PROCEDURE — 36415 COLL VENOUS BLD VENIPUNCTURE: CPT | Performed by: INTERNAL MEDICINE

## 2020-11-02 PROCEDURE — 80048 BASIC METABOLIC PNL TOTAL CA: CPT | Performed by: INTERNAL MEDICINE

## 2020-11-03 RX ORDER — METOPROLOL SUCCINATE 50 MG/1
50 TABLET, EXTENDED RELEASE ORAL DAILY
Qty: 90 TABLET | Refills: 3 | Status: SHIPPED | OUTPATIENT
Start: 2020-11-03 | End: 2021-11-02

## 2020-11-15 DIAGNOSIS — I10 BENIGN ESSENTIAL HYPERTENSION: Primary | ICD-10-CM

## 2020-11-15 LAB
ANION GAP SERPL CALCULATED.3IONS-SCNC: 6 MMOL/L (ref 3–14)
BUN SERPL-MCNC: 17 MG/DL (ref 7–30)
CALCIUM SERPL-MCNC: 9.1 MG/DL (ref 8.5–10.1)
CHLORIDE SERPL-SCNC: 106 MMOL/L (ref 94–109)
CO2 SERPL-SCNC: 28 MMOL/L (ref 20–32)
CREAT SERPL-MCNC: 1.21 MG/DL (ref 0.66–1.25)
GFR SERPL CREATININE-BSD FRML MDRD: 61 ML/MIN/{1.73_M2}
GLUCOSE SERPL-MCNC: 94 MG/DL (ref 70–99)
POTASSIUM SERPL-SCNC: 3.9 MMOL/L (ref 3.4–5.3)
SODIUM SERPL-SCNC: 140 MMOL/L (ref 133–144)

## 2020-11-15 PROCEDURE — 80048 BASIC METABOLIC PNL TOTAL CA: CPT | Performed by: INTERNAL MEDICINE

## 2020-11-15 PROCEDURE — 36415 COLL VENOUS BLD VENIPUNCTURE: CPT | Performed by: INTERNAL MEDICINE

## 2020-11-16 ENCOUNTER — CARE COORDINATION (OUTPATIENT)
Dept: CARDIOLOGY | Facility: CLINIC | Age: 70
End: 2020-11-16

## 2020-11-16 DIAGNOSIS — I10 BENIGN ESSENTIAL HYPERTENSION: Primary | ICD-10-CM

## 2020-11-16 NOTE — PROGRESS NOTES
BMP results from 11/15/20 noted. Pt had repeat bmp after stopping lisinopril, and taking Aleve/advil due to decreased renal function with last bmp. His metoprolol succinate was increased to 50 mg daily.   Pt wrote a my chart stating he is happy with his results, but that he would feel more comfortable with the creatinine even lower. Pt stated his BP and pulse rate are great on the increased metoprolol XL dose. Pt wanted to know if  is ok with him doing 2-3 strenuous workouts at the gym every week as long as he stays hydrated. I will send an update on results to  to see if pt needs to have another bmp anytime soon. ESletteboe RN November 16, 2020, 9:57 AM    Component      Latest Ref Rng & Units 11/2/2020 11/15/2020   Sodium      133 - 144 mmol/L 140 140   Potassium      3.4 - 5.3 mmol/L 4.6 3.9   Chloride      94 - 109 mmol/L 107 106   Carbon Dioxide      20 - 32 mmol/L 28 28   Anion Gap      3 - 14 mmol/L 5 6   Glucose      70 - 99 mg/dL 106 (H) 94   Urea Nitrogen      7 - 30 mg/dL 18 17   Creatinine      0.66 - 1.25 mg/dL 1.93 (H) 1.21   GFR Estimate      >60 mL/min/1.73:m2 34 (L) 61   GFR Estimate If Black      >60 mL/min/1.73:m2 40 (L) 70   Calcium      8.5 - 10.1 mg/dL 9.4 9.1

## 2020-11-16 NOTE — TELEPHONE ENCOUNTER
"I am ok with him doing \"strenuous\" workouts at the gym. I am happy with the BMP. Creat normalized. If he would be more comfortable watching the creatinine more, we could repeat a BMP in a month. EE  "

## 2020-11-17 ENCOUNTER — DOCUMENTATION ONLY (OUTPATIENT)
Dept: CARDIOLOGY | Facility: CLINIC | Age: 70
End: 2020-11-17

## 2020-11-17 NOTE — PROGRESS NOTES
Patient called today to schedule follow up lab work, and wanted to verify that all future orders were placed so we can release them, and not sent to any other facility as there has been some confusion in the past. Thank you! -lab

## 2020-11-24 ENCOUNTER — VIRTUAL VISIT (OUTPATIENT)
Dept: FAMILY MEDICINE | Facility: OTHER | Age: 70
End: 2020-11-24
Payer: COMMERCIAL

## 2020-11-24 DIAGNOSIS — Z20.822 SUSPECTED COVID-19 VIRUS INFECTION: Primary | ICD-10-CM

## 2020-11-24 PROCEDURE — 99421 OL DIG E/M SVC 5-10 MIN: CPT | Performed by: PHYSICIAN ASSISTANT

## 2020-11-24 NOTE — PROGRESS NOTES
"Date: 2020 13:26:29  Clinician: Aniyah Lai  Clinician NPI: [id number]  Patient: Quan Murphy  Patient : 1950  Patient Address: [address] [place] [address]  Patient Phone: [phone]  Visit Protocol: URI  Patient Summary:  Quan is a 70 year old ( : 1950 ) male who initiated a [facility] Visit for COVID-19 (Coronavirus) evaluation and screening. When asked the question \"Please sign me up to receive news, health information and promotions from [facility].\", Quan responded \"Yes\".    Quan states his symptoms started gradually 3-4 days ago. After his symptoms started, they improved and then got worse again.   His symptoms consist of facial pain or pressure, myalgia, malaise, a sore throat, a headache, enlarged lymph nodes, and nasal congestion. He is experiencing difficulty breathing due to nasal congestion but he is not short of breath.   Symptom details     Nasal secretions: The color of his mucus is clear.    Sore throat: Quan reports having moderate throat pain (4-6 on a 10 point pain scale), does not have exudate on his tonsils, and can swallow liquids. The lymph nodes in his neck are enlarged. A rash has not appeared on the skin since the sore throat started.     Facial pain or pressure: The facial pain or pressure does not feel worse when bending or leaning forward.     Headache: He states the headache is mild (1-3 on a 10 point pain scale).      Quan denies having vomiting, rhinitis, chills, teeth pain, ageusia, diarrhea, ear pain, wheezing, fever, cough, nausea, and anosmia. He also denies taking antibiotic medication in the past month and having recent facial or sinus surgery in the past 60 days.   Precipitating events  Quan is not sure if he has been exposed to someone with strep throat. He has not recently been exposed to someone with influenza. Quan has been in close contact with the following high risk individuals: adults 65 or older and children under the age of " 5.   Pertinent COVID-19 (Coronavirus) information  Quan does not work or volunteer as healthcare worker or a . In the past 14 days, Quan has not worked or volunteered at a healthcare facility or group living setting.   In the past 14 days, he also has not lived in a congregate living setting.   Quan has not had a close contact with a laboratory-confirmed COVID-19 patient within 14 days of symptom onset.    Since December 2019, Quan has not been tested for COVID-19 and has had upper respiratory infection (URI) or influenza-like illness.      Date(s) of previous URI or influenza-like illness (free-text): November and January 2020     Symptoms Quan experienced during previous URI or influenza-like illness as reported by the patient (free-text): Fever, body aches, coughing, sneezing, chronic sinusitis        Pertinent medical history  Quan had 2 sinus infections within the past year.   Quan does not need a return to work/school note.   Weight: 220 lbs   Quan does not smoke or use smokeless tobacco.   Weight: 220 lbs    MEDICATIONS: Ambien oral, metoprolol succinate oral, Norco oral, Crestor oral, Celexa oral, alprazolam (bulk), ALLERGIES: NKDA  Clinician Response:  Dear Quan,   Your symptoms show that you may have coronavirus (COVID-19). This illness can cause fever, cough and trouble breathing. Many people get a mild case and get better on their own. Some people can get very sick.  What should I do?  We would like to test you for this virus.   1. Please call [phone] to schedule your visit. Explain that you were referred by [facility] to have a COVID-19 test. Be ready to share your [facility] visit ID number.  * If you need to schedule in [facility] please call [phone] or for [employer] [place] employees please call [phone].  * If you need to schedule in the [place] area please call [phone]. [place] employees call [phone].  The following will serve as your written order for this COVID  "Test, ordered by me, for the indication of suspected COVID [Z20.828]: The test will be ordered in [employer], our electronic health record, after you are scheduled. It will show as ordered and authorized by Dequan Carroll MD.  Order: COVID-19 (Coronavirus) PCR for SYMPTOMATIC testing from [facility].   2. When it's time for your COVID test:  Stay at least 6 feet away from others. (If someone will drive you to your test, stay in the backseat, as far away from the  as you can.)   Cover your mouth and nose with a mask, tissue or washcloth.  Go straight to the testing site. Don't make any stops on the way there or back.      3.Starting now: Stay home and away from others (self-isolate) until:   You've had no fever---and no medicine that reduces fever---for one full day (24 hours). And...   Your other symptoms have gotten better. For example, your cough or breathing has improved. And...   At least 10 days have passed since your symptoms started.       During this time, don't leave the house except for testing or medical care.   Stay in your own room, even for meals. Use your own bathroom if you can.   Stay away from others in your home. No hugging, kissing or shaking hands. No visitors.  Don't go to work, school or anywhere else.    Clean \"high touch\" surfaces often (doorknobs, counters, handles, etc.). Use a household cleaning spray or wipes. You'll find a full list of  on the EPA website: www.epa.gov/pesticide-registration/list-n-disinfectants-use-against-sars-cov-2.   Cover your mouth and nose with a mask, tissue or washcloth to avoid spreading germs.  Wash your hands and face often. Use soap and water.  Caregivers in these groups are at risk for severe illness due to COVID-19:  o People 65 years and older  o People who live in a nursing home or long-term care facility  o People with chronic disease (lung, heart, cancer, diabetes, kidney, liver, immunologic)  o People who have a weakened immune system, including " those who:   Are in cancer treatment  Take medicine that weakens the immune system, such as corticosteroids  Had a bone marrow or organ transplant  Have an immune deficiency  Have poorly controlled HIV or AIDS  Are obese (body mass index of 40 or higher)  Smoke regularly   o Caregivers should wear gloves while washing dishes, handling laundry and cleaning bedrooms and bathrooms.  o Use caution when washing and drying laundry: Don't shake dirty laundry, and use the warmest water setting that you can.  o For more tips, go to www.cdc.gov/coronavirus/2019-ncov/downloads/10Things.pdf.    4.Sign up for [employer]. We know it's scary to hear that you might have COVID-19. We want to track your symptoms to make sure you're okay over the next 2 weeks. Please look for an email from [employer]---this is a free, online program that we'll use to keep in touch. To sign up, follow the link in the email. Learn more at [email or web address]  How can I take care of myself?   Get lots of rest. Drink extra fluids (unless a doctor has told you not to).   Take Tylenol (acetaminophen) for fever or pain. If you have liver or kidney problems, ask your family doctor if it's okay to take Tylenol.   Adults can take either:    650 mg (two 325 mg pills) every 4 to 6 hours, or...   1,000 mg (two 500 mg pills) every 8 hours as needed.    Note: Don't take more than 3,000 mg in one day. Acetaminophen is found in many medicines (both prescribed and over-the-counter medicines). Read all labels to be sure you don't take too much.   For children, check the Tylenol bottle for the right dose. The dose is based on the child's age or weight.    If you have other health problems (like cancer, heart failure, an organ transplant or severe kidney disease): Call your specialty clinic if you don't feel better in the next 2 days.       Know when to call 911. Emergency warning signs include:    Trouble breathing or shortness of breath Pain or  pressure in the chest that doesn't go away Feeling confused like you haven't felt before, or not being able to wake up Bluish-colored lips or face.  Where can I get more information?   [facility] -- About COVID-19: [email or web address]   CDC -- What to Do If You're Sick: www.cdc.gov/coronavirus/2019-ncov/about/steps-when-sick.html   CDC -- Ending Home Isolation: www.cdc.gov/coronavirus/2019-ncov/hcp/disposition-in-home-patients.html   [facility] -- Caring for Someone: www.cdc.gov/coronavirus/2019-ncov/if-you-are-sick/care-for-someone.html   [facility] -- Interim Guidance for Hospital Discharge to Home: www.health.[facility].mn./diseases/coronavirus/hcp/hospdischarge.pdf   [facility] clinical trials (COVID-19 research studies): clinicalaffairs.[facility]-clinical-trials    Below are the COVID-19 hotlines at the [facility] of Health ([facility]). Interpreters are available.    For health questions: Call [phone] or 1-448.186.1299 (7 a.m. to 7 p.m.) For questions about schools and childcare: Call 373-593-7324 or 1-368.690.4174 (7 a.m. to 7 p.m.)    Diagnosis: Contact with and (suspected) exposure to other viral communicable diseases  Diagnosis ICD: Z20.828

## 2020-11-25 DIAGNOSIS — Z20.822 SUSPECTED COVID-19 VIRUS INFECTION: ICD-10-CM

## 2020-11-25 PROCEDURE — U0003 INFECTIOUS AGENT DETECTION BY NUCLEIC ACID (DNA OR RNA); SEVERE ACUTE RESPIRATORY SYNDROME CORONAVIRUS 2 (SARS-COV-2) (CORONAVIRUS DISEASE [COVID-19]), AMPLIFIED PROBE TECHNIQUE, MAKING USE OF HIGH THROUGHPUT TECHNOLOGIES AS DESCRIBED BY CMS-2020-01-R: HCPCS | Performed by: FAMILY MEDICINE

## 2020-11-26 LAB
SARS-COV-2 RNA SPEC QL NAA+PROBE: NOT DETECTED
SPECIMEN SOURCE: NORMAL

## 2020-12-08 ENCOUNTER — MYC MEDICAL ADVICE (OUTPATIENT)
Dept: FAMILY MEDICINE | Facility: CLINIC | Age: 70
End: 2020-12-08

## 2020-12-20 ENCOUNTER — MYC MEDICAL ADVICE (OUTPATIENT)
Dept: FAMILY MEDICINE | Facility: CLINIC | Age: 70
End: 2020-12-20

## 2020-12-20 DIAGNOSIS — I10 BENIGN ESSENTIAL HYPERTENSION: ICD-10-CM

## 2020-12-20 LAB
ANION GAP SERPL CALCULATED.3IONS-SCNC: 3 MMOL/L (ref 3–14)
BUN SERPL-MCNC: 15 MG/DL (ref 7–30)
CALCIUM SERPL-MCNC: 8.7 MG/DL (ref 8.5–10.1)
CHLORIDE SERPL-SCNC: 108 MMOL/L (ref 94–109)
CO2 SERPL-SCNC: 29 MMOL/L (ref 20–32)
CREAT SERPL-MCNC: 1.22 MG/DL (ref 0.66–1.25)
GFR SERPL CREATININE-BSD FRML MDRD: 60 ML/MIN/{1.73_M2}
GLUCOSE SERPL-MCNC: 85 MG/DL (ref 70–99)
POTASSIUM SERPL-SCNC: 4.2 MMOL/L (ref 3.4–5.3)
SODIUM SERPL-SCNC: 140 MMOL/L (ref 133–144)

## 2020-12-20 PROCEDURE — 80048 BASIC METABOLIC PNL TOTAL CA: CPT | Performed by: INTERNAL MEDICINE

## 2020-12-20 PROCEDURE — 36415 COLL VENOUS BLD VENIPUNCTURE: CPT | Performed by: INTERNAL MEDICINE

## 2020-12-21 NOTE — PROGRESS NOTES
Updated patient with results via [facility]. BMP was done just for a one month recheck to make sure creatinine was remaining stable.     Component      Latest Ref Rng & Units 12/20/2020   Sodium      133 - 144 mmol/L 140   Potassium      3.4 - 5.3 mmol/L 4.2   Chloride      94 - 109 mmol/L 108   Carbon Dioxide      20 - 32 mmol/L 29   Anion Gap      3 - 14 mmol/L 3   Glucose      70 - 99 mg/dL 85   Urea Nitrogen      7 - 30 mg/dL 15   Creatinine      0.66 - 1.25 mg/dL 1.22   GFR Estimate      >60 mL/min/1.73:m2 60 (L)   GFR Estimate If Black      >60 mL/min/1.73:m2 69   Calcium      8.5 - 10.1 mg/dL 8.7

## 2021-01-08 ENCOUNTER — MYC MEDICAL ADVICE (OUTPATIENT)
Dept: FAMILY MEDICINE | Facility: CLINIC | Age: 71
End: 2021-01-08

## 2021-01-08 DIAGNOSIS — F41.9 ANXIETY: ICD-10-CM

## 2021-01-08 RX ORDER — CITALOPRAM HYDROBROMIDE 40 MG/1
TABLET ORAL
Qty: 30 TABLET | Refills: 8 | Status: SHIPPED | OUTPATIENT
Start: 2021-01-08 | End: 2021-09-16

## 2021-01-08 NOTE — TELEPHONE ENCOUNTER
"  Requested Prescriptions   Pending Prescriptions Disp Refills     citalopram (CELEXA) 40 MG tablet [Pharmacy Med Name: CITALOPRAM HYDROBROMIDE 40MG TABS] 30 tablet 10     Sig: TAKE ONE TABLET BY MOUTH ONCE DAILY   10/23/2020    SSRIs Protocol Passed - 1/8/2021 11:35 AM        Passed - Recent (12 mo) or future (30 days) visit within the authorizing provider's specialty     Patient has had an office visit with the authorizing provider or a provider within the authorizing providers department within the previous 12 mos or has a future within next 30 days. See \"Patient Info\" tab in inbasket, or \"Choose Columns\" in Meds & Orders section of the refill encounter.              Passed - Medication is active on med list        Passed - Patient is age 18 or older         Prescription approved per [facility] Refill Protocol.  Renay Jackson RN      "

## 2021-01-13 DIAGNOSIS — I10 BENIGN ESSENTIAL HYPERTENSION: ICD-10-CM

## 2021-01-13 RX ORDER — ASPIRIN 81 MG/1
TABLET ORAL
Qty: 90 TABLET | Refills: 1 | Status: SHIPPED | OUTPATIENT
Start: 2021-01-13 | End: 2022-02-02

## 2021-01-13 NOTE — TELEPHONE ENCOUNTER
Prescription approved per [facility] Refill Protocol.    MELLY CarlosN, RN  [facility]

## 2021-01-15 ENCOUNTER — MYC MEDICAL ADVICE (OUTPATIENT)
Dept: CARDIOLOGY | Facility: CLINIC | Age: 71
End: 2021-01-15

## 2021-02-22 ENCOUNTER — MYC MEDICAL ADVICE (OUTPATIENT)
Dept: FAMILY MEDICINE | Facility: CLINIC | Age: 71
End: 2021-02-22

## 2021-02-22 DIAGNOSIS — M12.9 ARTHROPATHY: ICD-10-CM

## 2021-02-23 ENCOUNTER — MYC MEDICAL ADVICE (OUTPATIENT)
Dept: FAMILY MEDICINE | Facility: CLINIC | Age: 71
End: 2021-02-23

## 2021-02-24 RX ORDER — HYDROXYZINE PAMOATE 25 MG/1
25 CAPSULE ORAL 4 TIMES DAILY PRN
Qty: 120 CAPSULE | Refills: 3 | Status: SHIPPED | OUTPATIENT
Start: 2021-02-24 | End: 2021-06-23

## 2021-02-24 NOTE — TELEPHONE ENCOUNTER
Prescription approved per [facility] Refill Protocol.  Nichol Hernandez RN

## 2021-03-02 DIAGNOSIS — E78.5 HYPERLIPIDEMIA LDL GOAL <130: ICD-10-CM

## 2021-03-02 RX ORDER — ROSUVASTATIN CALCIUM 40 MG/1
40 TABLET, COATED ORAL DAILY
Qty: 90 TABLET | Refills: 1 | Status: SHIPPED | OUTPATIENT
Start: 2021-03-02 | End: 2021-06-23

## 2021-03-04 ENCOUNTER — OFFICE VISIT (OUTPATIENT)
Dept: FAMILY MEDICINE | Facility: CLINIC | Age: 71
End: 2021-03-04
Payer: MEDICARE

## 2021-03-04 VITALS
DIASTOLIC BLOOD PRESSURE: 82 MMHG | HEIGHT: 69 IN | BODY MASS INDEX: 32.29 KG/M2 | TEMPERATURE: 96.2 F | HEART RATE: 82 BPM | SYSTOLIC BLOOD PRESSURE: 130 MMHG | WEIGHT: 218 LBS | RESPIRATION RATE: 18 BRPM | OXYGEN SATURATION: 96 %

## 2021-03-04 DIAGNOSIS — J32.0 CHRONIC MAXILLARY SINUSITIS: ICD-10-CM

## 2021-03-04 DIAGNOSIS — F41.9 ANXIETY: ICD-10-CM

## 2021-03-04 DIAGNOSIS — R07.0 THROAT PAIN: Primary | ICD-10-CM

## 2021-03-04 PROCEDURE — 99213 OFFICE O/P EST LOW 20 MIN: CPT | Performed by: FAMILY MEDICINE

## 2021-03-04 ASSESSMENT — MIFFLIN-ST. JEOR: SCORE: 1736.28

## 2021-03-04 ASSESSMENT — PAIN SCALES - GENERAL: PAINLEVEL: NO PAIN (0)

## 2021-03-04 NOTE — PROGRESS NOTES
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  This needs to be evaluated by ENT and he has an appointment in 2 and half weeks.  In the meantime we will put him on some Augmentin.  No acute difficulty swallowing.  - amoxicillin-clavulanate (AUGMENTIN) 875-125 MG tablet; Take 1 tablet by mouth 2 times daily for 10 days             BMI:   Estimated body mass index is 32.66 kg/m  as calculated from the following:    Height as of this encounter: 1.74 m (5' 8.5\").    Weight as of this encounter: 98.9 kg (218 lb).           No follow-ups on file.    Jose Hernandez MD  [facility] is [facility] 70 year [facility] who presents for the following health issues : As noted below.  Is been going on for a while.  Feels like something is in the left side of his throat.  No difficulty swallowing.  But it just does not go away.    HPI       Concern - Mouth problem  Onset: months  Description: feels like something is stuck in the back of his throat. Mild irritation    Intensity: mild  Progression of Symptoms:  same  Accompanying Signs & Symptoms: none  Previous history of similar problem: none  Precipitating factors:        Worsened by: none  Alleviating factors:        Improved by: none  Therapies tried and outcome: tried gargling and neddi pot- no relief. Has an appointment with ENT at the end of this month.        Review of Systems   Constitutional, HEENT, cardiovascular, pulmonary, gi and gu systems are negative, except as otherwise noted.      Objective    /82 (BP Location: Left arm, Patient Position: Sitting, Cuff Size: Adult Large)   Pulse 82   Temp 96.2  F (35.7  C) (Temporal)   Resp 18   Ht 1.74 m (5' 8.5\")   Wt 98.9 kg (218 lb)   SpO2 96%   BMI 32.66 kg/m    Body mass index is 32.66 kg/m .  Physical Exam   GENERAL: healthy, alert and no distress  EYES: Eyes grossly normal to inspection, PERRL and conjunctivae and " sclerae normal  HENT: normal cephalic/atraumatic, ear canals and TM's normal and erythema and perhaps some swelling in the left tonsillar pillar with the area of leukoplakia.  Not able to palpate for fluctuance because of active gag reflex.  NECK: no adenopathy, no asymmetry, masses, or scars and thyroid normal to palpation  RESP: lungs clear to auscultation - no rales, rhonchi or wheezes  SKIN: no suspicious lesions or rashes  PSYCH: mentation appears normal, affect normal/bright

## 2021-03-05 ENCOUNTER — MYC MEDICAL ADVICE (OUTPATIENT)
Dept: FAMILY MEDICINE | Facility: CLINIC | Age: 71
End: 2021-03-05

## 2021-03-19 NOTE — PROGRESS NOTES
ENT Consultation    Quan Murphy who is a 70 year old male seen in consultation at the request of self.      History of Present Illness - Quan Murphy is a 70 year old male presents with chief complaint of soreness and discomfort in the left side of his throat foreign body sensation also discomfort in his neck on the left side.  Right ear does not bother him.  He started noticing those symptoms couple months ago.  He was initially started on a course of Augmentin for possible infectious etiology but the symptoms felt improved.  He is not a smoker never did.  He does have history of prostate cancer.      Body mass index is 33.19 kg/m .    Weight management plan: Patient was referred to their PCP to discuss a diet and exercise plan.    BP Readings from Last 1 Encounters:   03/22/21 138/80       BP noted to be well controlled today in office.     Quan IS NOT a smoker/uses chewing tobacco.      Past Medical History -   Past Medical History:   Diagnosis Date     Allergy, unspecified not elsewhere classified     Seasonal allergies, pollen, dust, smoke and animals     Antiplatelet or antithrombotic long-term use      Anxiety      Arthritis      Chest pain      Chronic sinusitis      Coronary atherosclerosis of unspecified type of vessel, native or graft     Coronary artery disease     Hyperlipidemia      Hypertension      Inguinal hernia      Kidney stones      Malignant neoplasm of prostate (H)     Prostate cancer     Prostate cancer (H)        Current Medications -   Current Outpatient Medications:      ALPRAZolam (XANAX) 0.5 MG tablet, TAKE 1 TABLET (0.5 MG) BY MOUTH 4 TIMES DAILY AS NEEDED FOR ANXIETY, Disp: 120 tablet, Rfl: 1     ASPIRIN ADULT LOW STRENGTH 81 MG EC tablet, TAKE ONE TABLET BY MOUTH ONCE DAILY, Disp: 90 tablet, Rfl: 1     citalopram (CELEXA) 40 MG tablet, TAKE ONE TABLET BY MOUTH ONCE DAILY, Disp: 30 tablet, Rfl: 8     HYDROcodone-acetaminophen (NORCO) 7.5-325 MG per tablet, Take 1 tablet by  mouth every 6 hours as needed for moderate to severe pain maximum 4 tablet(s) per day, Disp: 120 tablet, Rfl: 0     hydrOXYzine (VISTARIL) 25 MG capsule, TAKE 1 CAPSULE (25 MG) BY MOUTH 4 TIMES DAILY AS NEEDED FOR ANXIETY, Disp: 120 capsule, Rfl: 3     metoprolol succinate ER (TOPROL-XL) 50 MG 24 hr tablet, Take 1 tablet (50 mg) by mouth daily, Disp: 90 tablet, Rfl: 3     omeprazole (PRILOSEC) 40 MG DR capsule, Take 1 capsule (40 mg) by mouth daily, Disp: 90 capsule, Rfl: 3     rosuvastatin (CRESTOR) 40 MG tablet, Take 1 tablet (40 mg) by mouth daily, Disp: 90 tablet, Rfl: 1     zolpidem (AMBIEN) 10 MG tablet, TAKE ONE TABLET BY MOUTH EVERY EVENING AS NEEDED FOR SLEEP, Disp: 30 tablet, Rfl: 5     nitroGLYcerin (NITROSTAT) 0.4 MG sublingual tablet, For chest pain place 1 tablet under the tongue every 5 minutes for 3 doses. If symptoms persist 5 minutes after 1st dose call 911. (Patient not taking: Reported on 10/20/2020), Disp: 25 tablet, Rfl: 0     oxyCODONE (ROXICODONE) 5 MG tablet, Take 1 tablet (5 mg) by mouth every 6 hours as needed for pain (Patient not taking: Reported on 10/20/2020), Disp: 12 tablet, Rfl: 0    Allergies -   Allergies   Allergen Reactions     Animal Dander      Azithromycin Nausea and Vomiting     Dust Mites      Pollen Extract      Smoke.        Social History -   Social History     Socioeconomic History     Marital status:      Spouse name: Alessandra     Number of children: 2     Years of education: Not on file     Highest education level: Not on file   Occupational History     Not on file   Social Needs     Financial resource strain: Not on file     Food insecurity     Worry: Not on file     Inability: Not on file     Transportation needs     Medical: Not on file     Non-medical: Not on file   Tobacco Use     Smoking status: Never Smoker     Smokeless tobacco: Never Used   Substance and Sexual Activity     Alcohol use: Yes     Alcohol/week: 8.3 standard drinks     Types: 10 Cans of beer  "per week     Comment: 2 beers a day      Drug use: No     Sexual activity: Yes     Partners: Female   Lifestyle     Physical activity     Days per week: Not on file     Minutes per session: Not on file     Stress: Not on file   Relationships     Social connections     Talks on phone: Not on file     Gets together: Not on file     Attends [employer] service: Not on file     Active member of club or organization: Not on file     Attends meetings of clubs or organizations: Not on file     Relationship status: Not on file     Intimate partner violence     Fear of current or ex partner: Not on file     Emotionally abused: Not on file     Physically abused: Not on file     Forced sexual activity: Not on file   Other Topics Concern      Service Not Asked     Blood Transfusions Not Asked     Caffeine Concern Not Asked     Occupational Exposure Not Asked     Hobby Hazards Not Asked     Sleep Concern Not Asked     Stress Concern Not Asked     Weight Concern Not Asked     Special Diet Not Asked     Back Care Not Asked     Exercise Not Asked     Bike Helmet Not Asked     Seat Belt Not Asked     Self-Exams Not Asked     Parent/sibling w/ CABG, MI or angioplasty before 65F 55M? Not Asked   Social History Narrative     Not on file       Family History -   Family History   Problem Relation Age of Onset     Hypertension Father         has had MI      Connective Tissue Disorder Mother         LUPUS     Heart Disease Mother         poor valve-needing replacement       Review of Systems - As per HPI and PMHx, otherwise review of system review of the head and neck negative. Otherwise 10+ review of system is negative    Physical Exam  /80   Temp 97  F (36.1  C) (Temporal)   Ht 1.745 m (5' 8.7\")   Wt 101.1 kg (222 lb 12.5 oz)   BMI 33.19 kg/m    BMI: Body mass index is 33.19 kg/m .    General - The patient is well nourished and well developed, and appears to have good nutritional status.  Alert and oriented to person and " place, answers questions and cooperates with examination appropriately.    SKIN - No suspicious lesions or rashes.  Respiration - No respiratory distress.  Head and Face - Normocephalic and atraumatic, with no gross asymmetry noted of the contour of the facial features.  The facial nerve is intact, with strong symmetric movements.    Voice and Breathing - The patient was breathing comfortably without the use of accessory muscles. The patients voice was clear and strong, and had appropriate pitch and quality.    Ears - Bilateral pinna and EACs with normal appearing overlying skin. Tympanic membrane intact with good mobility on pneumatic otoscopy bilaterally. Bony landmarks of the ossicular chain are normal. The tympanic membranes are normal in appearance. No retraction, perforation, or masses.  No fluid or purulence was seen in the external canal or the middle ear.     Eyes - Extraocular movements intact.  Sclera were not icteric or injected, conjunctiva were pink and moist.    Mouth - Examination of the oral cavity showed pink, healthy oral mucosa.  The tongue was mobile and midline, and the dentition were in good condition.      Throat - The walls of the oropharynx were smooth, pink, moist, symmetric, and had no lesions or ulcerations.  The tonsillar pillars and soft palate were symmetric.  The uvula was midline on elevation.  Left tonsil appears to be 3+ in size with quite indurated erythematous irregular.         neck - Normal midline excursion of the laryngotracheal complex during swallowing.  Full range of motion on passive movement.  Palpation of the occipital, submental, submandibular, internal jugular chain, and supraclavicular nodes did not demonstrate any abnormal lymph nodes or masses.  The carotid pulse was palpable bilaterally.  Palpation of the thyroid was soft and smooth, with no nodules or goiter appreciated.  The trachea was mobile and midline.  There is definitely tenderness on palpation around  level 2 level 3 area on the left side of the neck but no discrete lymphadenopathy appreciated.    Nose - External contour is symmetric, no gross deflection or scars.  Nasal mucosa is pink and moist with no abnormal mucus.  The septum was midline and non-obstructive, turbinates of normal size and position.  No polyps, masses, or purulence noted on examination.    Neuro - Nonfocal neuro exam is normal, CN 2 through 12 intact, normal gait and muscle tone.      Performed in clinic today:  Biopsy of the left tonsillar tumor.  Patient status and benefits of the biopsy of the left tonsil which is to go ahead with it.  Patient is set up anesthetized with Cetacaine.  Then local anesthetic was performed using 1% lidocaine 1 100,000 epinephrine.  Using 4 mm punch biopsy tonsils entered steroids anterior pillar.  Tissues obtained using cup forceps both for pathology as well as for virology looking at HPV type XVI and XVIII.  Patient tolerated procedure well.  Hemostasis was controlled with silver nitrate.      A/P - Quan Murphy is a 70 year old male with left tonsil tumor.  Will await the results of pathology in the virology for HPV positivity.  Also CT scan of the soft tissues of the neck will be obtained.  Patient is referred to the [facility] tumor board assuming positive results of the work-up.      Too Christensen MD

## 2021-03-22 ENCOUNTER — OFFICE VISIT (OUTPATIENT)
Dept: OTOLARYNGOLOGY | Facility: CLINIC | Age: 71
End: 2021-03-22
Payer: MEDICARE

## 2021-03-22 VITALS
WEIGHT: 222.78 LBS | BODY MASS INDEX: 33 KG/M2 | HEIGHT: 69 IN | TEMPERATURE: 97 F | DIASTOLIC BLOOD PRESSURE: 80 MMHG | SYSTOLIC BLOOD PRESSURE: 138 MMHG

## 2021-03-22 DIAGNOSIS — C09.9 MALIGNANT NEOPLASM OF TONSIL (H): Primary | ICD-10-CM

## 2021-03-22 PROCEDURE — 40808 BIOPSY OF MOUTH LESION: CPT | Performed by: OTOLARYNGOLOGY

## 2021-03-22 PROCEDURE — 99N1020 PR STATISTIC H-SEND OUTS PREP: Performed by: OTOLARYNGOLOGY

## 2021-03-22 PROCEDURE — G0452 MOLECULAR PATHOLOGY INTERPR: HCPCS | Performed by: PATHOLOGY

## 2021-03-22 PROCEDURE — 99N1102: Performed by: OTOLARYNGOLOGY

## 2021-03-22 PROCEDURE — 99204 OFFICE O/P NEW MOD 45 MIN: CPT | Mod: 25 | Performed by: OTOLARYNGOLOGY

## 2021-03-22 PROCEDURE — 88305 TISSUE EXAM BY PATHOLOGIST: CPT | Performed by: PATHOLOGY

## 2021-03-22 PROCEDURE — 88342 IMHCHEM/IMCYTCHM 1ST ANTB: CPT | Performed by: PATHOLOGY

## 2021-03-22 PROCEDURE — 87624 HPV HI-RISK TYP POOLED RSLT: CPT | Performed by: OTOLARYNGOLOGY

## 2021-03-22 ASSESSMENT — MIFFLIN-ST. JEOR: SCORE: 1761.13

## 2021-03-23 ENCOUNTER — HOSPITAL ENCOUNTER (OUTPATIENT)
Dept: CT IMAGING | Facility: CLINIC | Age: 71
Discharge: HOME | End: 2021-03-23
Attending: OTOLARYNGOLOGY | Admitting: OTOLARYNGOLOGY
Payer: MEDICARE

## 2021-03-23 DIAGNOSIS — C09.9 MALIGNANT NEOPLASM OF TONSIL (H): ICD-10-CM

## 2021-03-23 PROCEDURE — 250N000011 HC RX IP 250 OP 636: Performed by: RADIOLOGY

## 2021-03-23 PROCEDURE — 250N000009 HC RX 250: Performed by: RADIOLOGY

## 2021-03-23 PROCEDURE — G1004 CDSM NDSC: HCPCS

## 2021-03-23 RX ORDER — IOPAMIDOL 755 MG/ML
500 INJECTION, SOLUTION INTRAVASCULAR ONCE
Status: COMPLETED | OUTPATIENT
Start: 2021-03-23 | End: 2021-03-23

## 2021-03-23 RX ADMIN — SODIUM CHLORIDE 68 ML: 9 INJECTION, SOLUTION INTRAVENOUS at 15:11

## 2021-03-23 RX ADMIN — IOPAMIDOL 80 ML: 755 INJECTION, SOLUTION INTRAVENOUS at 15:11

## 2021-03-24 ENCOUNTER — MYC MEDICAL ADVICE (OUTPATIENT)
Dept: FAMILY MEDICINE | Facility: CLINIC | Age: 71
End: 2021-03-24

## 2021-03-24 ENCOUNTER — TELEPHONE (OUTPATIENT)
Dept: OTOLARYNGOLOGY | Facility: CLINIC | Age: 71
End: 2021-03-24
Payer: COMMERCIAL

## 2021-03-24 ENCOUNTER — TELEPHONE (OUTPATIENT)
Dept: OTOLARYNGOLOGY | Facility: OTHER | Age: 71
End: 2021-03-24

## 2021-03-24 DIAGNOSIS — R59.0 CERVICAL LYMPHADENOPATHY: ICD-10-CM

## 2021-03-24 DIAGNOSIS — D49.0 TONSILLAR TUMOR: Primary | ICD-10-CM

## 2021-03-24 LAB — COPATH REPORT: NORMAL

## 2021-03-24 NOTE — TELEPHONE ENCOUNTER
Reason for Call:  Request for results:    Name of test or procedure: CT Scan     Date of test of procedure: yesterday     Pt saw the results on Mychart (without provider explanation or plan)    Pt is very concerned that his tonsil is malignant and has metastasized in his lymph nodes.     Pt and wife are hoping to learn the plan and next steps. He is aware we are still waiting on pathology but right now he would like to speak with provider or staff so that he can sleep tonight.         OK to leave the result message on voice mail or with a family member? YES    Phone number Patient can be reached at:  Cell number on file:    Telephone Information:   Mobile [phone]   Or wife Alessandra at [phone]        Call taken on 3/24/2021 at 11:50 AM by Suri Davison

## 2021-03-24 NOTE — TELEPHONE ENCOUNTER
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

## 2021-03-24 NOTE — TELEPHONE ENCOUNTER
Patient states that Dr. Christensen reached out to patient this afternoon and answered some of his questions. He does have an appointment at the  of  on Monday morning. He was just looking for Dr. Hernandez's opinion because he trusts you.    Patient was informed that it is alright with Dr. Hernandez to bump his Norco up to 5 a day and once he is over this crisis we will go back to the 4 a day.     Patient states that he has is questions answered there is no need for Dr. Hernandez to call him back at this time.     He is thankful for the phone call.     Nita Livingston, [facility]

## 2021-03-24 NOTE — TELEPHONE ENCOUNTER
Dr. Hernandez, You do NOT have any appt openings virtual or otherwise until 3/31/21. New wants to talk with you ASAP. Please make recommendation?   Thank you..............RUTH Rolle

## 2021-03-24 NOTE — TELEPHONE ENCOUNTER
[facility] Call Center    Phone Message    May a detailed message be left on voicemail: no     Reason for Call: Appointment Intake    Referring Provider Name: Too Christensen MD in  ENT  Diagnosis and/or Symptoms: Malignant neoplasm of tonsil    Action Taken: Message routed to:  [facility] ([facility] [[id number]]    Travel Screening: Not Applicable

## 2021-03-25 DIAGNOSIS — C09.9 SQUAMOUS CELL CARCINOMA OF TONSIL (H): Primary | ICD-10-CM

## 2021-03-25 NOTE — TELEPHONE ENCOUNTER
Dr. Christensen spoke with patient yesterday. He said it's ok to close this encounter and he will get the HPV results to his inbasket when results.  Christa Moran RN on 3/25/2021 at 11:24 AM

## 2021-03-29 ENCOUNTER — PRE VISIT (OUTPATIENT)
Dept: OTOLARYNGOLOGY | Facility: CLINIC | Age: 71
End: 2021-03-29

## 2021-03-31 ENCOUNTER — PATIENT OUTREACH (OUTPATIENT)
Dept: OTOLARYNGOLOGY | Facility: CLINIC | Age: 71
End: 2021-03-31

## 2021-03-31 NOTE — TELEPHONE ENCOUNTER
Called and left message for patient after receiving message regarding questions patient has prior to his upcoming appointments with Dr. Fnag and Dr. Robbins on Friday. Left direct line and encouraged patient return call to further discuss his questions.     Chela Mariano, RN, BSN

## 2021-04-02 ENCOUNTER — OFFICE VISIT (OUTPATIENT)
Dept: OTOLARYNGOLOGY | Facility: CLINIC | Age: 71
End: 2021-04-02
Payer: MEDICARE

## 2021-04-02 ENCOUNTER — HOSPITAL ENCOUNTER (OUTPATIENT)
Dept: PET IMAGING | Facility: CLINIC | Age: 71
End: 2021-04-02
Attending: OTOLARYNGOLOGY
Payer: MEDICARE

## 2021-04-02 ENCOUNTER — ALLIED HEALTH/NURSE VISIT (OUTPATIENT)
Dept: SPEECH THERAPY | Facility: CLINIC | Age: 71
End: 2021-04-02

## 2021-04-02 VITALS
BODY MASS INDEX: 33.31 KG/M2 | TEMPERATURE: 96.6 F | WEIGHT: 224.87 LBS | HEIGHT: 69 IN | OXYGEN SATURATION: 96 % | HEART RATE: 80 BPM

## 2021-04-02 DIAGNOSIS — C09.9 SQUAMOUS CELL CARCINOMA OF TONSIL (H): Primary | ICD-10-CM

## 2021-04-02 DIAGNOSIS — C09.9 SQUAMOUS CELL CARCINOMA OF TONSIL (H): ICD-10-CM

## 2021-04-02 LAB — COPATH REPORT: NORMAL

## 2021-04-02 PROCEDURE — 74177 CT ABD & PELVIS W/CONTRAST: CPT | Mod: 26

## 2021-04-02 PROCEDURE — 70491 CT SOFT TISSUE NECK W/DYE: CPT

## 2021-04-02 PROCEDURE — G1004 CDSM NDSC: HCPCS | Mod: GC

## 2021-04-02 PROCEDURE — 78816 PET IMAGE W/CT FULL BODY: CPT | Mod: PI,MG

## 2021-04-02 PROCEDURE — 78816 PET IMAGE W/CT FULL BODY: CPT | Mod: 26

## 2021-04-02 PROCEDURE — 74177 CT ABD & PELVIS W/CONTRAST: CPT

## 2021-04-02 PROCEDURE — 70491 CT SOFT TISSUE NECK W/DYE: CPT | Mod: 26 | Performed by: RADIOLOGY

## 2021-04-02 PROCEDURE — 99215 OFFICE O/P EST HI 40 MIN: CPT | Mod: 25 | Performed by: OTOLARYNGOLOGY

## 2021-04-02 PROCEDURE — 71260 CT THORAX DX C+: CPT | Mod: 26

## 2021-04-02 PROCEDURE — 250N000011 HC RX IP 250 OP 636: Performed by: OTOLARYNGOLOGY

## 2021-04-02 PROCEDURE — 99204 OFFICE O/P NEW MOD 45 MIN: CPT | Performed by: RADIOLOGY

## 2021-04-02 PROCEDURE — A9552 F18 FDG: HCPCS | Performed by: OTOLARYNGOLOGY

## 2021-04-02 PROCEDURE — 31575 DIAGNOSTIC LARYNGOSCOPY: CPT | Performed by: OTOLARYNGOLOGY

## 2021-04-02 PROCEDURE — 343N000001 HC RX 343: Performed by: OTOLARYNGOLOGY

## 2021-04-02 RX ORDER — IOPAMIDOL 755 MG/ML
50-135 INJECTION, SOLUTION INTRAVASCULAR ONCE
Status: COMPLETED | OUTPATIENT
Start: 2021-04-02 | End: 2021-04-02

## 2021-04-02 RX ADMIN — FLUDEOXYGLUCOSE F-18 13.19 MCI.: 500 INJECTION, SOLUTION INTRAVENOUS at 12:56

## 2021-04-02 RX ADMIN — IOPAMIDOL 135 ML: 755 INJECTION, SOLUTION INTRAVENOUS at 13:54

## 2021-04-02 ASSESSMENT — MIFFLIN-ST. JEOR: SCORE: 1767.44

## 2021-04-02 NOTE — LETTER
4/2/2021       RE: Quan Murphy  [address]  [place] [address]     Dear Colleague,    Thank you for referring your patient, Quan Murphy, to the [facility] [place] [facility]. Please see a copy of my visit note below.    Dear Dr. Christensen:    I had the pleasure of meeting Quan Murphy in consultation today at the [facility] at your request.     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 The patient has a history of foreign body sensation on the left side starting a few months ago. He tried gargling and using a neti pot with no improvement. He also developed left neck discomfort which radiates into his head. He saw primary care on 3/4/2021. He was treated with a course of antibiotics with no improvement. He saw seen by Dr Christensen on 3/22/2021. At that time he had an enlarged left tonsil. A biopsy was performed at that time which was consistent with a p16+ SCC. He had a CT scan on 3/23/2021 which showed a 2.9 x 2.7 x 3.5 cm left tonsil cancer, involving the soft palate, left level II and III lymphadenopathy. He had a PET scan performed today which showed the primary tumor in the tonsil and soft palate, left-sided lymphadenopathy with SHAYNE present (3.4 x 2.0 cm, 1.8 x 1.8 cm), right-sided node (1.4 x 0.9 cm) with FDG activity, no distant disease.    He says today that he has no issues with his swallowing.  He did have some tenderness after the biopsy.  He denies any coughing or choking on foods or liquids.  He has no changes in his weight.    He sees his dentist yearly.    He has borderline normal kidney function: Cr 1.22, GFR 60.    He got his COVID vaccine.        Past medical history: prostate cancer, anxiety, CAD, hypertension, hyperlipidemia    Past surgical history: cardiac angioplasty and stenting in 2016,  radical prostatectomy    Social history: Nonsmoker. Chewing tobacco use occasionally when fishing. . Drink 2-3 beers per day. Lives with wife (nurse). Retired .     Family history: Negative    MEDICATIONS:     Current Outpatient Medications   Medication Sig Dispense Refill     ALPRAZolam (XANAX) 0.5 MG tablet TAKE 1 TABLET (0.5 MG) BY MOUTH 4 TIMES DAILY AS NEEDED FOR ANXIETY 120 tablet 1     ASPIRIN ADULT LOW STRENGTH 81 MG EC tablet TAKE ONE TABLET BY MOUTH ONCE DAILY 90 tablet 1     citalopram (CELEXA) 40 MG tablet TAKE ONE TABLET BY MOUTH ONCE DAILY 30 tablet 8     HYDROcodone-acetaminophen (NORCO) 7.5-325 MG per tablet Take 1 tablet by mouth every 6 hours as needed for moderate to severe pain maximum 4 tablet(s) per day 120 tablet 0     hydrOXYzine (VISTARIL) 25 MG capsule TAKE 1 CAPSULE (25 MG) BY MOUTH 4 TIMES DAILY AS NEEDED FOR ANXIETY 120 capsule 3     metoprolol succinate ER (TOPROL-XL) 50 MG 24 hr tablet Take 1 tablet (50 mg) by mouth daily 90 tablet 3     nitroGLYcerin (NITROSTAT) 0.4 MG sublingual tablet For chest pain place 1 tablet under the tongue every 5 minutes for 3 doses. If symptoms persist 5 minutes after 1st dose call 911. 25 tablet 0     omeprazole (PRILOSEC) 40 MG DR capsule Take 1 capsule (40 mg) by mouth daily 90 capsule 3     oxyCODONE (ROXICODONE) 5 MG tablet Take 1 tablet (5 mg) by mouth every 6 hours as needed for pain 12 tablet 0     rosuvastatin (CRESTOR) 40 MG tablet Take 1 tablet (40 mg) by mouth daily 90 tablet 1     zolpidem (AMBIEN) 10 MG tablet TAKE ONE TABLET BY MOUTH EVERY EVENING AS NEEDED FOR SLEEP 30 tablet 5       ALLERGIES:    Allergies   Allergen Reactions     Animal Dander      Azithromycin Nausea and Vomiting     Dust Mites      Pollen Extract      Smoke.        HABITS/SOCIAL HISTORY:   Nonsmoker. Chewing tobacco use occasionally when fishing. . Drink 2-3 beers per day.   Lives with wife (nurse). Retired .      Social History     Socioeconomic History     Marital status:      Spouse name: Alessandra     Number of children: 2     Years of education: Not on file     Highest education level: Not on file   Occupational History     Not on file   Social Needs     Financial resource strain: Not on file     Food insecurity     Worry: Not on file     Inability: Not on file     Transportation needs     Medical: Not on file     Non-medical: Not on file   Tobacco Use     Smoking status: Never Smoker     Smokeless tobacco: Never Used   Substance and Sexual Activity     Alcohol use: Yes     Alcohol/week: 8.3 standard drinks     Types: 10 Cans of beer per week     Comment: 2 beers a day      Drug use: No     Sexual activity: Yes     Partners: Female   Lifestyle     Physical activity     Days per week: Not on file     Minutes per session: Not on file     Stress: Not on file   Relationships     Social connections     Talks on phone: Not on file     Gets together: Not on file     Attends [employer] service: Not on file     Active member of club or organization: Not on file     Attends meetings of clubs or organizations: Not on file     Relationship status: Not on file     Intimate partner violence     Fear of current or ex partner: Not on file     Emotionally abused: Not on file     Physically abused: Not on file     Forced sexual activity: Not on file   Other Topics Concern      Service Not Asked     Blood Transfusions Not Asked     Caffeine Concern Not Asked     Occupational Exposure Not Asked     Hobby Hazards Not Asked     Sleep Concern Not Asked     Stress Concern Not Asked     Weight Concern Not Asked     Special Diet Not Asked     Back Care Not Asked     Exercise Not Asked     Bike Helmet Not Asked     Seat Belt Not Asked     Self-Exams Not Asked     Parent/sibling w/ CABG, MI or angioplasty before 65F 55M? Not Asked   Social History Narrative     Not on file       PAST MEDICAL HISTORY:   Past Medical History:   Diagnosis  Date     Allergy, unspecified not elsewhere classified     Seasonal allergies, pollen, dust, smoke and animals     Antiplatelet or antithrombotic long-term use      Anxiety      Arthritis      Chest pain      Chronic sinusitis      Coronary atherosclerosis of unspecified type of vessel, native or graft     Coronary artery disease     Hyperlipidemia      Hypertension      Inguinal hernia      Kidney stones      Malignant neoplasm of prostate (H)     Prostate cancer     Prostate cancer (H)         PAST SURGICAL HISTORY:   Past Surgical History:   Procedure Laterality Date     ARTHRODESIS FOOT  7/23/2013    Procedure: ARTHRODESIS FOOT;  Great Toe Arthrodesis Left Foot;  Surgeon: Ash Gonzalez DPM;  Location: [facility] OR     ARTHRODESIS FOOT  6/10/2014    Procedure: ARTHRODESIS FOOT;  Surgeon: Ash Gonzalez DPM;  Location: [facility] OR     C LAPAROSCOPY, SURGICAL PROSTATECTOMY, RETROPUBIC RADICAL, W/NERVE SPARING  11/30/2004    With full bilateral pelvic lymphadenectomy.  [facility].     C TOTAL KNEE ARTHROPLASTY  05/01/08    Left knee     COLONOSCOPY  10/7/2013    Procedure: COLONOSCOPY;  Colonoscopy;  Surgeon: Mike Fallon MD;  Location:  GI     ESOPHAGOSCOPY, GASTROSCOPY, DUODENOSCOPY (EGD), COMBINED N/A 2/12/2020    Procedure: ESOPHAGOGASTRODUODENOSCOPY (EGD);  Surgeon: Sam Escobar MD;  Location:  GI     EXTRACORPOREAL SHOCK WAVE LITHOTRIPSY (ESWL) Bilateral 10/18/2017    Procedure: EXTRACORPOREAL SHOCK WAVE LITHOTRIPSY (ESWL);  BILATERAL EXTRACORPOREAL SHOCKWAVE LITHOTRIPSY ;  Surgeon: Meir Torres MD;  Location: [facility] OR     HC CORRECT BUNION,SIMPLE  08/11/2005    x3     HC REMV TOE BENIGN BONE LESN  08/11/2005     HERNIORRHAPHY INGUINAL  7/3/2013    Procedure: HERNIORRHAPHY INGUINAL;  Open Repair Inguinal hernia Right with mesh ;  Surgeon: Sam Escobar MD;  Location: [facility] OR     MOHS MICROGRAPHIC PROCEDURE  08/23/11    ear and chin-[facility]     OPEN REDUCTION INTERNAL FIXATION  "WRIST Right 7/18/2017    Procedure: OPEN REDUCTION INTERNAL FIXATION WRIST;  Right distal radius open reduction and internal fixation;  Surgeon: Pedro Blanca DO;  Location: [facility] OR     RECONSTRUCT FOREFOOT WITH METATARSOPHALANGEAL (MTP) FUSION  6/10/2014    Procedure: RECONSTRUCT FOREFOOT WITH METATARSOPHALANGEAL (MTP) FUSION;  Surgeon: Ash Gonzalez DPM;  Location: [facility] OR     STENT, CORONARY, DEMI       SURGICAL HISTORY OF -   1999/1974    lt knee     SURGICAL HISTORY OF -   10/2004    lithotripsy     SURGICAL HISTORY OF -   11/05    angiogram with stent       FAMILY HISTORY:    Family History   Problem Relation Age of Onset     Hypertension Father         has had MI      Connective Tissue Disorder Mother         LUPUS     Heart Disease Mother         poor valve-needing replacement       REVIEW OF SYSTEMS:  12 point ROS was negative other than the symptoms noted above in the HPI.  Patient Supplied Answers to Review of Systems  [facility] ROS 3/28/2021   Constitutional Problems with sleep   Neurology Headache   Psychology Frequently feeling anxious   Ears, Nose, Throat Nasal congestion or drainage, Sore throat, Trouble swallowing   Allergy/Immunology Allergies or hay fever   Hematologic Lymph node swelling         PHYSICAL EXAMINATION:   Pulse 80   Temp 96.6  F (35.9  C) (Temporal)   Ht 1.74 m (5' 8.5\")   Wt 102 kg (224 lb 13.9 oz)   SpO2 96%   BMI 33.69 kg/m     Appearance:   normal; NAD, age-appropriate appearance, well-developed, normal habitus   Communication:   normal; communicates verbally, normal voice quality   Head/Face:   inspection -  Normal; no scars or visible lesions   Salivary glands -  Normal size, no tenderness, swelling, or palpable masses   Facial strength -  Normal and symmetric    Skin:  normal, no rash   Ears:  auricle (AD) -  normal  EAC (AD) -  normal  TM (AD) -  Normal, no effusion  auricle (AS) -  normal  EAC (AS) -  normal  TM (AS) -  Normal, no effusion  Normal clinical " speech reception   Nose:  Ext. inspection -  Normal  Internal Inspection -  Normal mucosa, septum, and turbinates   Nasopharynx:  normal mucosa, no masses   Oral Cavity:  lips -  Normal mucosa, oral competence, and stoma size   Age-appropriate dentition, healthy gingival mucosa   Hard palate, buccal, floor of mouth mucosa normal   Tongue - normal movement, no lesions, no palpable masses   Oropharynx:  Left tonsil with an ulcerative lesion extending onto the soft palate, extends towards the uvula but does not cross midline, on palpation intraorally the tumor extends towards but not involving the RMT, does not appear to extend into the base of tongue or vallecula  On scope exam there is submucosal extension of the tumor into the soft palate extending up towards the nasopharynx, and inferiorly has some fullness along the lateral pharyngeal wall extending towards the AE fold on the left   Hypopharynx:  Normal pyriform sinus and pharyngeal wall mucosa   No pooled secretions    Larynx:  Epiglottis, AE folds, false vocal cords, true vocal cords, arytenoids normal in appearance, bilaterally mobile cords    Neck: No visible mass or asymmetry   thyroid -  Normal   Normal range of motion   Lymphatic:  Level 3 node about 2.5 cm in size palpable in the neck that is fixed to the SCM, level 2 node palpable in the neck about 2 cm in size   Cardiovascular:  warm, pink, well-perfused extremities without swelling, tenderness, or edema   Respiratory:  Normal respiratory effort, no stridor   Neuro/Psych.:  mood/affect -  normal  mental status -  normal           PROCEDURES:   Flexible fiberoptic laryngoscopy: Scope exam was indicated due to tonsil cancer. Verbal consent was obtained. The nasal cavity was prepped with an aerosolized solution of topical anesthetic and vasoconstrictive agent. The scope was passed through the anterior nasal cavity and advanced. Inspection of the nasopharynx revealed no gross abnormality.  There is  submucosal extension of the tumor into the soft palate on the left side.  There is fullness along the lateral pharyngeal wall extending towards the AE fold.  There is tumor in the left tonsil.  The vallecula is clear. The epiglottis, AE folds, false cords, true cords, arytenoids are normal.  Inspection of the larynx revealed bilaterally mobile vocal cords. Pyriform sinuses are symmetric. The airway is patent. Procedure tolerated well with no immediate complications noted.                    RESULTS REVIEWED:   I reviewed the notes from Dr Christensen, notes from PCP and cardiology, pathology report, CT/PET scan report from 4/2021, CT report from 3/2021 (15 minutes spent on previsit chart review on 4/2/201)    I independently reviewed the PET/CT scan images from 4/2021    I independently reviewed the CT scan images from 3/2021 which show a left tonsil mass with soft palate involvement, vessel present along the posterior aspect of the tumor but plane present between carotid, left level II and III metastatic lymphadenopathy, level III node appears to have less clearly defined borders with concerns for SHAYNE, does have some enlargement of the right tonsil    Care discussed with Dr Robbins and SLP team      IMPRESSION AND PLAN:   Quan Murphy is a 70 year old man with a 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    He had a PET scan today. This shows tumor involvement in the left tonsil extending into the soft palate.  Clinically extends towards the RMT but does not involve the RMT itself.  There is lymphadenopathy on the left side with clinical and radiologic SHAYNE.  On imaging he does have a contralateral node that is FDG avid that is concerning for metastatic disease.    Dr Robbins and I discussed treatment options with him. We discussed that given his clinical exam and his imaging findings he would be most appropriate for definitive chemoradiation.  I am concerned not only about the soft palate involvement but also the clinical and  radiologic signs of any that would require triple modality therapy if we are to start with surgery.    Dr. Robbins spoke with the patient in detail about radiation therapy along with the side effects.      I have placed a medical oncology referral.    I have ordered a baseline video swallow study to be done prior to treatment.     We discussed with him that we will try to avoid a PEG. He will need one if he loses more than 10% of his weight.  He is motivated to avoid the feeding tube.    He has a dentist. He was recommended to get an appointment for cleaning and fluoride treatment.     We will review his case at tumor board on Friday but I do not anticipate any major changes in the plan.    I will see him back 6 weeks post treatment in [facility] D clinic in conjunction with Dr Robbins.  We will then see him back in 12 weeks post treatment in [facility] D clinic with his PET scan.    Thank you very much for the opportunity to participate in the care of your patient.      Ligia Fang MD, M.D.  Otolaryngology- Head & Neck Surgery      This note was dictated with voice recognition software and then edited. Please excuse any unintentional errors.         CC:  Ahmet Robbins MD  Department of Radiation Oncology  [facility]      Too Christensen MD  [address] [place] [address]

## 2021-04-02 NOTE — PROGRESS NOTES
Dear Dr. Christensen:    I had the pleasure of meeting Quan Murphy in consultation today at the [facility] at your request.     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 The patient has a history of foreign body sensation on the left side starting a few months ago. He tried gargling and using a neti pot with no improvement. He also developed left neck discomfort which radiates into his head. He saw primary care on 3/4/2021. He was treated with a course of antibiotics with no improvement. He saw seen by Dr Christensen on 3/22/2021. At that time he had an enlarged left tonsil. A biopsy was performed at that time which was consistent with a p16+ SCC. He had a CT scan on 3/23/2021 which showed a 2.9 x 2.7 x 3.5 cm left tonsil cancer, involving the soft palate, left level II and III lymphadenopathy. He had a PET scan performed today which showed the primary tumor in the tonsil and soft palate, left-sided lymphadenopathy with SHAYNE present (3.4 x 2.0 cm, 1.8 x 1.8 cm), right-sided node (1.4 x 0.9 cm) with FDG activity, no distant disease.    He says today that he has no issues with his swallowing.  He did have some tenderness after the biopsy.  He denies any coughing or choking on foods or liquids.  He has no changes in his weight.    He sees his dentist yearly.    He has borderline normal kidney function: Cr 1.22, GFR 60.    He got his COVID vaccine.        Past medical history: prostate cancer, anxiety, CAD, hypertension, hyperlipidemia    Past surgical history: cardiac angioplasty and stenting in 2016, radical prostatectomy    Social history: Nonsmoker. Chewing tobacco use occasionally when fishing. . Drink 2-3 beers per day. Lives with wife (nurse). Retired .     Family history: Negative    MEDICATIONS:     Current Outpatient Medications   Medication Sig Dispense Refill     ALPRAZolam (XANAX)  0.5 MG tablet TAKE 1 TABLET (0.5 MG) BY MOUTH 4 TIMES DAILY AS NEEDED FOR ANXIETY 120 tablet 1     ASPIRIN ADULT LOW STRENGTH 81 MG EC tablet TAKE ONE TABLET BY MOUTH ONCE DAILY 90 tablet 1     citalopram (CELEXA) 40 MG tablet TAKE ONE TABLET BY MOUTH ONCE DAILY 30 tablet 8     HYDROcodone-acetaminophen (NORCO) 7.5-325 MG per tablet Take 1 tablet by mouth every 6 hours as needed for moderate to severe pain maximum 4 tablet(s) per day 120 tablet 0     hydrOXYzine (VISTARIL) 25 MG capsule TAKE 1 CAPSULE (25 MG) BY MOUTH 4 TIMES DAILY AS NEEDED FOR ANXIETY 120 capsule 3     metoprolol succinate ER (TOPROL-XL) 50 MG 24 hr tablet Take 1 tablet (50 mg) by mouth daily 90 tablet 3     nitroGLYcerin (NITROSTAT) 0.4 MG sublingual tablet For chest pain place 1 tablet under the tongue every 5 minutes for 3 doses. If symptoms persist 5 minutes after 1st dose call 911. 25 tablet 0     omeprazole (PRILOSEC) 40 MG DR capsule Take 1 capsule (40 mg) by mouth daily 90 capsule 3     oxyCODONE (ROXICODONE) 5 MG tablet Take 1 tablet (5 mg) by mouth every 6 hours as needed for pain 12 tablet 0     rosuvastatin (CRESTOR) 40 MG tablet Take 1 tablet (40 mg) by mouth daily 90 tablet 1     zolpidem (AMBIEN) 10 MG tablet TAKE ONE TABLET BY MOUTH EVERY EVENING AS NEEDED FOR SLEEP 30 tablet 5       ALLERGIES:    Allergies   Allergen Reactions     Animal Dander      Azithromycin Nausea and Vomiting     Dust Mites      Pollen Extract      Smoke.        HABITS/SOCIAL HISTORY:   Nonsmoker. Chewing tobacco use occasionally when fishing. . Drink 2-3 beers per day.   Lives with wife (nurse). Retired .     Social History     Socioeconomic History     Marital status:      Spouse name: Alessandra     Number of children: 2     Years of education: Not on file     Highest education level: Not on file   Occupational History     Not on file   Social Needs     Financial resource strain: Not on file     Food insecurity     Worry: Not on  file     Inability: Not on file     Transportation needs     Medical: Not on file     Non-medical: Not on file   Tobacco Use     Smoking status: Never Smoker     Smokeless tobacco: Never Used   Substance and Sexual Activity     Alcohol use: Yes     Alcohol/week: 8.3 standard drinks     Types: 10 Cans of beer per week     Comment: 2 beers a day      Drug use: No     Sexual activity: Yes     Partners: Female   Lifestyle     Physical activity     Days per week: Not on file     Minutes per session: Not on file     Stress: Not on file   Relationships     Social connections     Talks on phone: Not on file     Gets together: Not on file     Attends Jewish service: Not on file     Active member of club or organization: Not on file     Attends meetings of clubs or organizations: Not on file     Relationship status: Not on file     Intimate partner violence     Fear of current or ex partner: Not on file     Emotionally abused: Not on file     Physically abused: Not on file     Forced sexual activity: Not on file   Other Topics Concern      Service Not Asked     Blood Transfusions Not Asked     Caffeine Concern Not Asked     Occupational Exposure Not Asked     Hobby Hazards Not Asked     Sleep Concern Not Asked     Stress Concern Not Asked     Weight Concern Not Asked     Special Diet Not Asked     Back Care Not Asked     Exercise Not Asked     Bike Helmet Not Asked     Seat Belt Not Asked     Self-Exams Not Asked     Parent/sibling w/ CABG, MI or angioplasty before 65F 55M? Not Asked   Social History Narrative     Not on file       PAST MEDICAL HISTORY:   Past Medical History:   Diagnosis Date     Allergy, unspecified not elsewhere classified     Seasonal allergies, pollen, dust, smoke and animals     Antiplatelet or antithrombotic long-term use      Anxiety      Arthritis      Chest pain      Chronic sinusitis      Coronary atherosclerosis of unspecified type of vessel, native or graft     Coronary artery disease      Hyperlipidemia      Hypertension      Inguinal hernia      Kidney stones      Malignant neoplasm of prostate ([facility])     Prostate cancer     Prostate cancer ([facility])         PAST SURGICAL HISTORY:   Past Surgical History:   Procedure Laterality Date     ARTHRODESIS FOOT  7/23/2013    Procedure: ARTHRODESIS FOOT;  Great Toe Arthrodesis Left Foot;  Surgeon: Ash Gonzalez DPM;  Location: [facility] OR     ARTHRODESIS FOOT  6/10/2014    Procedure: ARTHRODESIS FOOT;  Surgeon: Ash Gonzalez DPM;  Location: [facility] OR     C LAPAROSCOPY, SURGICAL PROSTATECTOMY, RETROPUBIC RADICAL, W/NERVE SPARING  11/30/2004    With full bilateral pelvic lymphadenectomy.  [facility].     C TOTAL KNEE ARTHROPLASTY  05/01/08    Left knee     COLONOSCOPY  10/7/2013    Procedure: COLONOSCOPY;  Colonoscopy;  Surgeon: Mike Fallon MD;  Location:  GI     ESOPHAGOSCOPY, GASTROSCOPY, DUODENOSCOPY (EGD), COMBINED N/A 2/12/2020    Procedure: ESOPHAGOGASTRODUODENOSCOPY (EGD);  Surgeon: Sam Escobar MD;  Location:  GI     EXTRACORPOREAL SHOCK WAVE LITHOTRIPSY (ESWL) Bilateral 10/18/2017    Procedure: EXTRACORPOREAL SHOCK WAVE LITHOTRIPSY (ESWL);  BILATERAL EXTRACORPOREAL SHOCKWAVE LITHOTRIPSY ;  Surgeon: Meir Torres MD;  Location: [facility] OR     HC CORRECT BUNION,SIMPLE  08/11/2005    x3     HC REMV TOE BENIGN BONE LESN  08/11/2005     HERNIORRHAPHY INGUINAL  7/3/2013    Procedure: HERNIORRHAPHY INGUINAL;  Open Repair Inguinal hernia Right with mesh ;  Surgeon: Sam Escobar MD;  Location: [facility] OR     MOHS MICROGRAPHIC PROCEDURE  08/23/11    ear and chin-[facility]     OPEN REDUCTION INTERNAL FIXATION WRIST Right 7/18/2017    Procedure: OPEN REDUCTION INTERNAL FIXATION WRIST;  Right distal radius open reduction and internal fixation;  Surgeon: Pedro Blanca DO;  Location: [facility] OR     RECONSTRUCT FOREFOOT WITH METATARSOPHALANGEAL (MTP) FUSION  6/10/2014    Procedure: RECONSTRUCT FOREFOOT WITH METATARSOPHALANGEAL (MTP)  "FUSION;  Surgeon: Ash Gonzalez DPM;  Location: [facility]     STENT, CORONARY, DEMI       SURGICAL HISTORY OF -   1999/1974    lt knee     SURGICAL HISTORY OF -   10/2004    lithotripsy     SURGICAL HISTORY OF -   11/05    angiogram with stent       FAMILY HISTORY:    Family History   Problem Relation Age of Onset     Hypertension Father         has had MI      Connective Tissue Disorder Mother         LUPUS     Heart Disease Mother         poor valve-needing replacement       REVIEW OF SYSTEMS:  12 point ROS was negative other than the symptoms noted above in the HPI.  Patient Supplied Answers to Review of Systems  [facility] ROS 3/28/2021   Constitutional Problems with sleep   Neurology Headache   Psychology Frequently feeling anxious   Ears, Nose, Throat Nasal congestion or drainage, Sore throat, Trouble swallowing   Allergy/Immunology Allergies or hay fever   Hematologic Lymph node swelling         PHYSICAL EXAMINATION:   Pulse 80   Temp 96.6  F (35.9  C) (Temporal)   Ht 1.74 m (5' 8.5\")   Wt 102 kg (224 lb 13.9 oz)   SpO2 96%   BMI 33.69 kg/m     Appearance:   normal; NAD, age-appropriate appearance, well-developed, normal habitus   Communication:   normal; communicates verbally, normal voice quality   Head/Face:   inspection -  Normal; no scars or visible lesions   Salivary glands -  Normal size, no tenderness, swelling, or palpable masses   Facial strength -  Normal and symmetric    Skin:  normal, no rash   Ears:  auricle (AD) -  normal  EAC (AD) -  normal  TM (AD) -  Normal, no effusion  auricle (AS) -  normal  EAC (AS) -  normal  TM (AS) -  Normal, no effusion  Normal clinical speech reception   Nose:  Ext. inspection -  Normal  Internal Inspection -  Normal mucosa, septum, and turbinates   Nasopharynx:  normal mucosa, no masses   Oral Cavity:  lips -  Normal mucosa, oral competence, and stoma size   Age-appropriate dentition, healthy gingival mucosa   Hard palate, buccal, floor of mouth mucosa normal   " Tongue - normal movement, no lesions, no palpable masses   Oropharynx:  Left tonsil with an ulcerative lesion extending onto the soft palate, extends towards the uvula but does not cross midline, on palpation intraorally the tumor extends towards but not involving the RMT, does not appear to extend into the base of tongue or vallecula  On scope exam there is submucosal extension of the tumor into the soft palate extending up towards the nasopharynx, and inferiorly has some fullness along the lateral pharyngeal wall extending towards the AE fold on the left   Hypopharynx:  Normal pyriform sinus and pharyngeal wall mucosa   No pooled secretions    Larynx:  Epiglottis, AE folds, false vocal cords, true vocal cords, arytenoids normal in appearance, bilaterally mobile cords    Neck: No visible mass or asymmetry   thyroid -  Normal   Normal range of motion   Lymphatic:  Level 3 node about 2.5 cm in size palpable in the neck that is fixed to the SCM, level 2 node palpable in the neck about 2 cm in size   Cardiovascular:  warm, pink, well-perfused extremities without swelling, tenderness, or edema   Respiratory:  Normal respiratory effort, no stridor   Neuro/Psych.:  mood/affect -  normal  mental status -  normal           PROCEDURES:   Flexible fiberoptic laryngoscopy: Scope exam was indicated due to tonsil cancer. Verbal consent was obtained. The nasal cavity was prepped with an aerosolized solution of topical anesthetic and vasoconstrictive agent. The scope was passed through the anterior nasal cavity and advanced. Inspection of the nasopharynx revealed no gross abnormality.  There is submucosal extension of the tumor into the soft palate on the left side.  There is fullness along the lateral pharyngeal wall extending towards the AE fold.  There is tumor in the left tonsil.  The vallecula is clear. The epiglottis, AE folds, false cords, true cords, arytenoids are normal.  Inspection of the larynx revealed bilaterally  mobile vocal cords. Pyriform sinuses are symmetric. The airway is patent. Procedure tolerated well with no immediate complications noted.                    RESULTS REVIEWED:   I reviewed the notes from Dr Christensen, notes from PCP and cardiology, pathology report, CT/PET scan report from 4/2021, CT report from 3/2021 (15 minutes spent on previsit chart review on 4/2/201)    I independently reviewed the PET/CT scan images from 4/2021    I independently reviewed the CT scan images from 3/2021 which show a left tonsil mass with soft palate involvement, vessel present along the posterior aspect of the tumor but plane present between carotid, left level II and III metastatic lymphadenopathy, level III node appears to have less clearly defined borders with concerns for SHAYNE, does have some enlargement of the right tonsil    Care discussed with Dr Robbins and SLP team      IMPRESSION AND PLAN:   Quan Murphy is a 70 year old man with a 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    He had a PET scan today. This shows tumor involvement in the left tonsil extending into the soft palate.  Clinically extends towards the RMT but does not involve the RMT itself.  There is lymphadenopathy on the left side with clinical and radiologic SHAYNE.  On imaging he does have a contralateral node that is FDG avid that is concerning for metastatic disease.    Dr Robbins and I discussed treatment options with him. We discussed that given his clinical exam and his imaging findings he would be most appropriate for definitive chemoradiation.  I am concerned not only about the soft palate involvement but also the clinical and radiologic signs of any that would require triple modality therapy if we are to start with surgery.    Dr. Robbins spoke with the patient in detail about radiation therapy along with the side effects.      I have placed a medical oncology referral.    I have ordered a baseline video swallow study to be done prior to treatment.     We  discussed with him that we will try to avoid a PEG. He will need one if he loses more than 10% of his weight.  He is motivated to avoid the feeding tube.    He has a dentist. He was recommended to get an appointment for cleaning and fluoride treatment.     We will review his case at tumor board on Friday but I do not anticipate any major changes in the plan.    I will see him back 6 weeks post treatment in [facility] clinic in conjunction with Dr Robbins.  We will then see him back in 12 weeks post treatment in [facility] clinic with his PET scan.    Thank you very much for the opportunity to participate in the care of your patient.      Ligia Fang MD, M.D.  Otolaryngology- Head & Neck Surgery      This note was dictated with voice recognition software and then edited. Please excuse any unintentional errors.         CC:  Ahmet Robbins MD  Department of Radiation Oncology  [facility]      Too Christensen MD  [address] [place] [address]

## 2021-04-02 NOTE — PROGRESS NOTES
Pt seen for brief allied health visit per MD request. Pt accompanied by his wife. Pt has newly dx T2N1 SCC p16+ of the left tonsil with bilateral neck disease. He will be undergoing chemoXRT. Introduced self and services. Pt will benefit from video swallow study prior to beginning treatment.     Time spent with pt: 4 minutes    JAJA Vaca MA (music), CCC-SLP   Speech Language Pathologist  [facility] Trained Vocologist   [facility]  Dept. of Otolaryngology  Department of Rehabilitation Services  [address] [place] [id number]  Email: [email or web address]   Phone: [phone]  Pronouns: she/her/hers

## 2021-04-02 NOTE — LETTER
2021      RE: Quan Murphy   [address]  [facility] [address]          Department of Radiation Oncology  [facility]  [address] [place] [address]  [phone]       Consultation Note    Name: Quan Murphy MRN: [id number]   : 1950   Date of Service: 2021  Referring: Dr. Ligia Fang / head and neck surgery     Reason for consultation: cT2 N2 M0 p16-positive squamous cell carcinoma of the left tonsil    History of Present Illness   Mr. Murphy is a 70 year old male who presented to ENT on 3/22/2021 with a couple month history of left-sided otalgia and globus sensation. Physical examination revealed a large left tonsil with biopsy (specimen: [id number]) confirming a p16-positive squamous cell carcinoma. He was subsequently referred to ENT multiD clinic and underwent a staging PET/CT earlier today that demonstrates a 2.7 cm FDG-avid left palatine tonsil lesion along with two hypermetabolic left cervical lymph nodes in levels II and IV measuring 1.8 and 3.4 cm, respectively, as well as a FDG-avid 1.4 cm right level II lymph node. The left cervical adenopathy additionally appeared to have extranodal extension with invasion of the adjacent sternocleidomastoid muscle. There was no evidence of distant metastatic disease.     On exam today, Mr. Murphy reports that overall he is doing fairly well with mildly increased left oropharyngeal pain following his recent biopsy. He is eating a regular diet without difficulty and his remaining ROS are otherwise negative.    Past Medical History:    Prostate cancer    CAD    HTN    Hyperlipidemia    Anxiety    Inguinal hernia    Nephrolithiasis    Chronic sinusitis    Past Surgical History:    Radical prostatectomy    Left total knee arthroplasty    Mohs excision of several small head and neck lesions    Inguinal herniorrhaphy    Metatarsophalangeal fusion    Coronary stent    ORIF right wrist    Extracorporeal shock wave  lithotripsy    Chemotherapy History:  None    Radiation History:  None    Pregnant: Not Applicable  Implanted Cardiac Devices: No    Medications:  Current Outpatient Medications   Medication Sig Dispense Refill     ALPRAZolam (XANAX) 0.5 MG tablet TAKE 1 TABLET (0.5 MG) BY MOUTH 4 TIMES DAILY AS NEEDED FOR ANXIETY 120 tablet 1     ASPIRIN ADULT LOW STRENGTH 81 MG EC tablet TAKE ONE TABLET BY MOUTH ONCE DAILY 90 tablet 1     citalopram (CELEXA) 40 MG tablet TAKE ONE TABLET BY MOUTH ONCE DAILY 30 tablet 8     HYDROcodone-acetaminophen (NORCO) 7.5-325 MG per tablet Take 1 tablet by mouth every 4 hours as needed for moderate to severe pain maximum 5 tablet(s) per day 150 tablet 0     hydrOXYzine (VISTARIL) 25 MG capsule TAKE 1 CAPSULE (25 MG) BY MOUTH 4 TIMES DAILY AS NEEDED FOR ANXIETY 120 capsule 3     metoprolol succinate ER (TOPROL-XL) 50 MG 24 hr tablet Take 1 tablet (50 mg) by mouth daily 90 tablet 3     nitroGLYcerin (NITROSTAT) 0.4 MG sublingual tablet For chest pain place 1 tablet under the tongue every 5 minutes for 3 doses. If symptoms persist 5 minutes after 1st dose call 911. 25 tablet 0     omeprazole (PRILOSEC) 40 MG DR capsule Take 1 capsule (40 mg) by mouth daily 90 capsule 3     oxyCODONE (ROXICODONE) 5 MG tablet Take 1 tablet (5 mg) by mouth every 6 hours as needed for pain 12 tablet 0     rosuvastatin (CRESTOR) 40 MG tablet Take 1 tablet (40 mg) by mouth daily 90 tablet 1     zolpidem (AMBIEN) 10 MG tablet TAKE ONE TABLET BY MOUTH EVERY EVENING AS NEEDED FOR SLEEP 30 tablet 5      Allergies:    Azithromycin    Social History:  Tobacco: Occasional chewing tobacco. Non-smoker.  Alcohol: 2-3 beers/day  Employment: Retired   Lives in [place], [place] with his wife    Family History:    No history of head and neck cancer    Review of Systems   A 10-point review of systems was performed. Pertinent findings are noted in the HPI.    Physical Exam   ECOG Status: 0    Vitals:  Weight: 102  kg  P: 80  Pain: Mild    General: Healthy-appearing 70 year old gentleman seated comfortably in an examination chair in [employer]  HEENT: NC/AT. EOMI. EACs clear bilaterally with normal appearing TMs. No rhinorrhea or epistaxis. Moist mucus membranes. There is a large exophytic lesion centered within the left tonsil that extends onto the left soft palate and extends to, but does not involve the uvula. Laterally, the tumor involves the anterior tonsillar pillar but does not extend to the left RMT. Inferiorly, there is no tumor palpated along the left glossotonsillar sulcus and the left base of tongue does not appear to be involved by the tumor.   Neck: There is a firm, fixed mass measuring approximately 3 cm in the left level II/III with a similarly-sized lesion located just inferior to this mass. There is no right cervical adenopathy appreciated.   Pulmonary: No wheezing, stridor or respiratory distress  Skin: Normal color and turgor    Dr. Fang performed a flexible nasopharyngoscopy in clinic. Please see her note for complete details. Briefly, this demonstrated a large exophytic tumor arising from the left tonsil without involvement of the left base of tongue, vallecula or epiglottis.    Imaging/Path/Labs   Imaging: Reviewed    Path: Reviewed    Labs: Reviewed    Assessment    Mr. Murphy is a 70 year old male with a newly diagnosed cT2 N2 M0 [id number] cell carcinoma of the left tonsil.    Plan   Dr. Fang and I both had lengthy discussions with Mr. Murphy and his wife regarding his newly diagnosed left tonsillar tumor. In light of the degree of soft palate involvement and bilateral otto involvement with radiographic evidence concerning for extranodal extension, we did not feel that he would be a great candidate for surgical resection. We instead proposed treatment using an organ-sparing approach with concurrent chemoradiotherapy. I specifically discussed a treatment plan consisting of 70 Gy delivered in 33  once-daily fractions with concurrent chemotherapy pending evaluation by our medical oncology colleagues. I reviewed the anticipated toxicities associated with this treatment and answered Mr. Murphy and his wife's questions to their stated satisfaction.    I briefly discussed PEG placement in the prophylactic vs reactive setting. Given Mr. Murphy' overall good performance status and his desire to avoid feeding tube placement, I discussed that it would be reasonable to consider reactive placement assuming he is not found to have any silent aspiration by our SLP team on baseline VSS.    I will plan to have Mr. Murphy return to radiation oncology clinic in the following 1-2 weeks to undergo a CT-simulation for radiotherapy treatment-planning purposes with treatment to commence shortly thereafter. In the interim, Dr. Fang and I will present his case at our next upcoming interdisciplinary head and neck tumor board and will also arrange for him to be seen by one of our medical oncology colleagues to evaluate his candidacy for concurrent chemotherapy.    Ahmet Robbins MD/PhD    Dept of Radiation Oncology  [facility]

## 2021-04-02 NOTE — NURSING NOTE
"Chief Complaint   Patient presents with     Consult     Malinant neoplasm of the tonsil       Pulse 80, temperature 96.6  F (35.9  C), temperature source Temporal, height 1.74 m (5' 8.5\"), weight 102 kg (224 lb 13.9 oz), SpO2 96 %.    Rhea Ovalles, EMT    "

## 2021-04-05 ENCOUNTER — TELEPHONE (OUTPATIENT)
Dept: OTOLARYNGOLOGY | Facility: CLINIC | Age: 71
End: 2021-04-05

## 2021-04-05 DIAGNOSIS — C09.9 SQUAMOUS CELL CARCINOMA OF TONSIL (H): Primary | ICD-10-CM

## 2021-04-05 NOTE — TELEPHONE ENCOUNTER
Returned call to patient who states that he would [facility] instead of his local [facility]. Dental referral placed and advised patient he will receive a call from dental clinic to schedule first consult. Patient was encouraged to call writer if he does not hear from them by tomorrow to arrange appointment. Patient verbalized understanding and was encouraged to call with further questions or concerns.     Chela Mariano, RN, BSN

## 2021-04-05 NOTE — TELEPHONE ENCOUNTER
[facility] Call Center    Phone Message    May a detailed message be left on voicemail: yes     Reason for Call: Other: Pt calling to let Team know that he wants to go to our Dentist that they referred last Friday at his Appt, Chela is gone so wanted me to send a message , Pt wanted a call back to discuss  Thank you,    Action Taken: Message routed to:  [facility] ([facility] ENT    Travel Screening: Not Applicable

## 2021-04-07 NOTE — PROGRESS NOTES
Head & Neck Tumor Conference Note        Status: New  Staff: Dr. 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 The patient has a history of foreign body sensation on the left side starting a few months ago. He tried gargling and using a neti pot with no improvement. He also developed left neck discomfort which radiates into his head. He saw primary care on 3/4/2021. He was treated with a course of antibiotics with no improvement. He saw seen by Dr Christensen on 3/22/2021. At that time he had an enlarged left tonsil. A biopsy was performed at that time which was consistent with a p16+ SCC. He had a CT scan on 3/23/2021 which showed a 2.9 x 2.7 x 3.5 cm left tonsil cancer, involving the soft palate, left level II and III lymphadenopathy. He had a PET scan performed today which showed the primary tumor in the tonsil and soft palate, left-sided lymphadenopathy with SHAYNE present (3.4 x 2.0 cm, 1.8 x 1.8 cm), right-sided node (1.4 x 0.9 cm) with FDG activity, no distant disease. A scope exam demonstrated a left sided tumor involving the tonsil with extension into the soft palpate and fullness along the left pharyngeal wall.     Pertinent PMH:   Past Medical History:   Diagnosis Date     Allergy, unspecified not elsewhere classified     Seasonal allergies, pollen, dust, smoke and animals     Antiplatelet or antithrombotic long-term use      Anxiety      Arthritis      Chest pain      Chronic sinusitis      Coronary atherosclerosis of unspecified type of vessel, native or graft     Coronary artery disease     Hyperlipidemia      Hypertension      Inguinal hernia      Kidney stones      Malignant neoplasm of prostate (H)     Prostate cancer     Prostate cancer (H)         Smoking Hx:   Social History     Tobacco Use      Smoking status: Never Smoker     Smokeless tobacco: Never Used   Substance Use Topics     Alcohol use: Yes     Alcohol/week: 8.3 standard drinks     Types: 10 Cans of beer per week     Comment: 2 beers a day      Drug use: No     Imaging:   PET 4/2/21  IMPRESSION:     1. FDG avid left axillary mass representing the biopsy-proven squamous  cell carcinoma.   2. Presumed metastatic, hypermetabolic left level 2/3 lymph nodes with  extracapsular extension and invasion of the SCM muscle. Single FDG  avid right level 2 lymph node suspicious for metastatic disease.     3. Please see separately dictated whole body PET/CT report for  evaluation of the thorax.    IMPRESSION:      1. No evidence of metastatic disease in the chest, abdomen, or pelvis.  See dedicated neuroradiology report of high-resolution PET/CT the neck  for findings concerning cervical malignancy.    Pathology:   Tonsil biopsy   SPECIMEN(S):   Left tonsil biopsy     FINAL DIAGNOSIS:   Oropharynx, left tonsil: Biopsy:   - Nonkeratinizing squamous cell carcinoma, P16 positive     Tumor Board Recommendation:   Discussion: This patients imaging was reviewed at tumor conference today. The above biopsy proven mass was noted as well as otto disease within left level II and III. Level II nodes are semi necrotic while level III are large and demonstrate likely extranodal extension with invasion of the SCM. After review a recommendation was made for chemorads.     Arun Arce MD PGY-3  Otolaryngology- Head and Neck Surgery    Documentation / Disclaimer Cancer Tumor Board Note  Cancer tumor board recommendations do not override what is determined to be reasonable care and treatment, which is dependent on the circumstances of a patient's case; the patient's medical, social, and personal concerns; and the clinical judgment of the oncologist [physician].

## 2021-04-08 ENCOUNTER — MYC MEDICAL ADVICE (OUTPATIENT)
Dept: FAMILY MEDICINE | Facility: CLINIC | Age: 71
End: 2021-04-08

## 2021-04-08 ENCOUNTER — TELEPHONE (OUTPATIENT)
Dept: FAMILY MEDICINE | Facility: CLINIC | Age: 71
End: 2021-04-08

## 2021-04-08 DIAGNOSIS — F41.9 ANXIETY: ICD-10-CM

## 2021-04-08 NOTE — TELEPHONE ENCOUNTER
Patient states that he called his insurance and got a over-ride to get his medication filled. Patient was informed that he is only allowed 5 tablets a day. He will not be able to go up to 6 tablets a day.  There will not be no further changes.     Nita Livingston, [facility]

## 2021-04-08 NOTE — PROGRESS NOTES
Department of Radiation Oncology  [address] [address]  [phone]       Consultation Note    Name: Quan Murphy MRN: [id number]   : 1950   Date of Service: 2021  Referring: Dr. Ligia Fang / head and neck surgery     Reason for consultation: cT2 N2 M0 p16-positive squamous cell carcinoma of the left tonsil    History of Present Illness   Mr. Murphy is a 70 year old male who presented to ENT on 3/22/2021 with a couple month history of left-sided otalgia and globus sensation. Physical examination revealed a large left tonsil with biopsy (specimen: [id number]) confirming a p16-positive squamous cell carcinoma. He was subsequently referred to ENT multiD clinic and underwent a staging PET/CT earlier today that demonstrates a 2.7 cm FDG-avid left palatine tonsil lesion along with two hypermetabolic left cervical lymph nodes in levels II and IV measuring 1.8 and 3.4 cm, respectively, as well as a FDG-avid 1.4 cm right level II lymph node. The left cervical adenopathy additionally appeared to have extranodal extension with invasion of the adjacent sternocleidomastoid muscle. There was no evidence of distant metastatic disease.     On exam today, Mr. Murphy reports that overall he is doing fairly well with mildly increased left oropharyngeal pain following his recent biopsy. He is eating a regular diet without difficulty and his remaining ROS are otherwise negative.    Past Medical History:    Prostate cancer    CAD    HTN    Hyperlipidemia    Anxiety    Inguinal hernia    Nephrolithiasis    Chronic sinusitis    Past Surgical History:    Radical prostatectomy    Left total knee arthroplasty    Mohs excision of several small head and neck lesions    Inguinal herniorrhaphy    Metatarsophalangeal fusion    Coronary stent    ORIF right wrist    Extracorporeal shock wave lithotripsy    Chemotherapy History:  None    Radiation  History:  None    Pregnant: Not Applicable  Implanted Cardiac Devices: No    Medications:  Current Outpatient Medications   Medication Sig Dispense Refill     ALPRAZolam (XANAX) 0.5 MG tablet TAKE 1 TABLET (0.5 MG) BY MOUTH 4 TIMES DAILY AS NEEDED FOR ANXIETY 120 tablet 1     ASPIRIN ADULT LOW STRENGTH 81 MG EC tablet TAKE ONE TABLET BY MOUTH ONCE DAILY 90 tablet 1     citalopram (CELEXA) 40 MG tablet TAKE ONE TABLET BY MOUTH ONCE DAILY 30 tablet 8     HYDROcodone-acetaminophen (NORCO) 7.5-325 MG per tablet Take 1 tablet by mouth every 4 hours as needed for moderate to severe pain maximum 5 tablet(s) per day 150 tablet 0     hydrOXYzine (VISTARIL) 25 MG capsule TAKE 1 CAPSULE (25 MG) BY MOUTH 4 TIMES DAILY AS NEEDED FOR ANXIETY 120 capsule 3     metoprolol succinate ER (TOPROL-XL) 50 MG 24 hr tablet Take 1 tablet (50 mg) by mouth daily 90 tablet 3     nitroGLYcerin (NITROSTAT) 0.4 MG sublingual tablet For chest pain place 1 tablet under the tongue every 5 minutes for 3 doses. If symptoms persist 5 minutes after 1st dose call 911. 25 tablet 0     omeprazole (PRILOSEC) 40 MG DR capsule Take 1 capsule (40 mg) by mouth daily 90 capsule 3     oxyCODONE (ROXICODONE) 5 MG tablet Take 1 tablet (5 mg) by mouth every 6 hours as needed for pain 12 tablet 0     rosuvastatin (CRESTOR) 40 MG tablet Take 1 tablet (40 mg) by mouth daily 90 tablet 1     zolpidem (AMBIEN) 10 MG tablet TAKE ONE TABLET BY MOUTH EVERY EVENING AS NEEDED FOR SLEEP 30 tablet 5      Allergies:    Azithromycin    Social History:  Tobacco: Occasional chewing tobacco. Non-smoker.  Alcohol: 2-3 beers/day  Employment: Retired   Lives in [place], [place] with his wife    Family History:    No history of head and neck cancer    Review of Systems   A 10-point review of systems was performed. Pertinent findings are noted in the HPI.    Physical Exam   ECOG Status: 0    Vitals:  Weight: 102 kg  P: 80  Pain: Mild    General: Healthy-appearing 70 year  old gentleman seated comfortably in an examination chair in [facility]  HEENT: NC/AT. EOMI. EACs clear bilaterally with normal appearing TMs. No rhinorrhea or epistaxis. Moist mucus membranes. There is a large exophytic lesion centered within the left tonsil that extends onto the left soft palate and extends to, but does not involve the uvula. Laterally, the tumor involves the anterior tonsillar pillar but does not extend to the left RMT. Inferiorly, there is no tumor palpated along the left glossotonsillar sulcus and the left base of tongue does not appear to be involved by the tumor.   Neck: There is a firm, fixed mass measuring approximately 3 cm in the left level II/III with a similarly-sized lesion located just inferior to this mass. There is no right cervical adenopathy appreciated.   Pulmonary: No wheezing, stridor or respiratory distress  Skin: Normal color and turgor    Dr. Fang performed a flexible nasopharyngoscopy in clinic. Please see her note for complete details. Briefly, this demonstrated a large exophytic tumor arising from the left tonsil without involvement of the left base of tongue, vallecula or epiglottis.    Imaging/Path/Labs   Imaging: Reviewed    Path: Reviewed    Labs: Reviewed    Assessment    Mr. 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    Plan   Dr. Fang and I both had lengthy discussions with Mr. Murphy and his wife regarding his newly diagnosed left tonsillar tumor. In light of the degree of soft palate involvement and bilateral otto involvement with radiographic evidence concerning for extranodal extension, we did not feel that he would be a great candidate for surgical resection. We instead proposed treatment using an organ-sparing approach with concurrent chemoradiotherapy. I specifically discussed a treatment plan consisting of 70 Gy delivered in 33 once-daily fractions with concurrent chemotherapy pending  evaluation by our medical oncology colleagues. I reviewed the anticipated toxicities associated with this treatment and answered Mr. Murphy and his wife's questions to their stated satisfaction.    I briefly discussed PEG placement in the prophylactic vs reactive setting. Given Mr. Murphy' overall good performance status and his desire to avoid feeding tube placement, I discussed that it would be reasonable to consider reactive placement assuming he is not found to have any silent aspiration by our SLP team on baseline VSS.    I will plan to have Mr. Murphy return to radiation oncology clinic in the following 1-2 weeks to undergo a CT-simulation for radiotherapy treatment-planning purposes with treatment to commence shortly thereafter. In the interim, Dr. Fang and I will present his case at our next upcoming interdisciplinary head and neck tumor board and will also arrange for him to be seen by one of our medical oncology colleagues to evaluate his candidacy for concurrent chemotherapy.    Ahmet Robbins MD/PhD    Dept of Radiation Oncology  [facility]

## 2021-04-08 NOTE — TELEPHONE ENCOUNTER
Patient is calling and stating he needs to speak to PCP or Donda urgently.  He states he was told by PCP that he could increase his Norco to 5 pills per day and that PCP would just send in a new prescription when he ran out.      Patient is reporting that he is currently out and that is because the pharmacy is wrong and he is due just based off of increasing to 5 per day.    RN called pharmacy and discussed with pharmacist.  Patient has been increasingly getting out of control with the medications he has been on and insisting he get refills early for the past couple of months.      *Patient picked up #120 at 3:46pm on 3/16/2021 (should not have taken 4 pills this day)  *Message from 3/24/2021 stating he could increase to 5 pills per day  *Even if he tool 5 pills on 3/24/21 (which patient was contacted at 6:03pm on 3/24/2021) the 120 pills should have lasted him through 3/9/2021 (this includes him taking 4 pills on the 16th) with 3 pills for the 10th.  *Patient is taking more than prescribed and insurance will not cover him to  the new prescription as he is not to be due to be out until 4/9/2021    Routing to PCP for further advice.  Patient states he is completely out.    Christa Chapman RN

## 2021-04-09 ENCOUNTER — TUMOR CONFERENCE (OUTPATIENT)
Dept: ONCOLOGY | Facility: CLINIC | Age: 71
End: 2021-04-09
Payer: MEDICARE

## 2021-04-09 LAB — COPATH REPORT: NORMAL

## 2021-04-09 RX ORDER — ALPRAZOLAM 0.5 MG
0.5 TABLET ORAL 4 TIMES DAILY PRN
Qty: 120 TABLET | Refills: 1 | Status: SHIPPED | OUTPATIENT
Start: 2021-04-09 | End: 2021-05-26

## 2021-04-09 NOTE — TELEPHONE ENCOUNTER
Xanax      Last Written Prescription Date:  02/12/2021  Last Fill Quantity: 120,   # refills: 1  Last Office Visit: 03/04/2021  Future Office visit:    Next 5 appointments (look out 90 days)    Apr 14, 2021 12:30 PM  Return Visit with [facility] Rad Onc Nurse  [facility]iation [facility] ([facility] ) [address]  [facility] [id number] [phone]       Routing refill request to provider for review/approval because:  Drug not on the [facility], [facility] or [facility] refill protocol or controlled substance    Olive Travis RN

## 2021-04-09 NOTE — PROGRESS NOTES
Called and spoke with patient regarding tumor board review. PET scan results were reviewed:    IMPRESSION:      1. No evidence of metastatic disease in the chest, abdomen, or pelvis.  See dedicated neuroradiology report of high-resolution PET/CT the neck  for findings concerning cervical malignancy.     2. Incidental findings including 8 mm bladder stone, patent right  common iliac artery stent, and colon diverticulosis without evidence  of diverticulitis.      IMPRESSION:     1. FDG avid left axillary mass representing the biopsy-proven squamous  cell carcinoma.   2. Presumed metastatic, hypermetabolic left level 2/3 lymph nodes with  extracapsular extension and invasion of the SCM muscle. Single FDG  avid right level 2 lymph node suspicious for metastatic disease.     3. Please see separately dictated whole body PET/CT report for  evaluation of the thorax.    Reviewed with patient that tumor board recommendation is to proceed with plan for chemo/rads. Patient has completed dental clearance and will obtain radiation guards and trays next week. He will proceed with medical oncology consult on Tuesday and simulation scan on Wednesday with Dr. Robbins. Patient in agreement with plan.     All questions answered and patient was encouraged to call with further questions or concerns.     Chela Mariano, RN, BSN

## 2021-04-13 ENCOUNTER — VIRTUAL VISIT (OUTPATIENT)
Dept: ONCOLOGY | Facility: CLINIC | Age: 71
End: 2021-04-13
Attending: OTOLARYNGOLOGY
Payer: MEDICARE

## 2021-04-13 DIAGNOSIS — C09.9 SQUAMOUS CELL CARCINOMA OF TONSIL (H): Primary | ICD-10-CM

## 2021-04-13 DIAGNOSIS — E83.42 HYPOMAGNESEMIA: ICD-10-CM

## 2021-04-13 DIAGNOSIS — C09.9 SQUAMOUS CELL CARCINOMA OF LEFT TONSIL (H): ICD-10-CM

## 2021-04-13 PROCEDURE — 999N001193 HC VIDEO/TELEPHONE VISIT; NO CHARGE

## 2021-04-13 PROCEDURE — 99205 OFFICE O/P NEW HI 60 MIN: CPT | Mod: 95 | Performed by: INTERNAL MEDICINE

## 2021-04-13 RX ORDER — ALBUTEROL SULFATE 0.83 MG/ML
2.5 SOLUTION RESPIRATORY (INHALATION)
Status: CANCELLED | OUTPATIENT
Start: 2021-04-26

## 2021-04-13 RX ORDER — EPINEPHRINE 1 MG/ML
0.3 INJECTION, SOLUTION INTRAMUSCULAR; SUBCUTANEOUS EVERY 5 MIN PRN
Status: CANCELLED | OUTPATIENT
Start: 2021-04-26

## 2021-04-13 RX ORDER — PALONOSETRON 0.05 MG/ML
0.25 INJECTION, SOLUTION INTRAVENOUS ONCE
Status: CANCELLED
Start: 2021-04-26

## 2021-04-13 RX ORDER — TRIAMCINOLONE ACETONIDE 1 MG/G
CREAM TOPICAL
COMMUNITY
Start: 2021-04-04 | End: 2021-05-14

## 2021-04-13 RX ORDER — SODIUM CHLORIDE 9 MG/ML
1000 INJECTION, SOLUTION INTRAVENOUS CONTINUOUS PRN
Status: CANCELLED
Start: 2021-04-26

## 2021-04-13 RX ORDER — METHYLPREDNISOLONE SODIUM SUCCINATE 125 MG/2ML
125 INJECTION, POWDER, LYOPHILIZED, FOR SOLUTION INTRAMUSCULAR; INTRAVENOUS
Status: CANCELLED
Start: 2021-04-26

## 2021-04-13 RX ORDER — LORAZEPAM 2 MG/ML
0.5 INJECTION INTRAMUSCULAR EVERY 4 HOURS PRN
Status: CANCELLED
Start: 2021-04-26

## 2021-04-13 RX ORDER — DIPHENHYDRAMINE HYDROCHLORIDE 50 MG/ML
50 INJECTION INTRAMUSCULAR; INTRAVENOUS
Status: CANCELLED
Start: 2021-04-26

## 2021-04-13 RX ORDER — TESTOSTERONE 1.62 MG/G
GEL TRANSDERMAL
COMMUNITY
End: 2022-02-10

## 2021-04-13 RX ORDER — MEPERIDINE HYDROCHLORIDE 25 MG/ML
25 INJECTION INTRAMUSCULAR; INTRAVENOUS; SUBCUTANEOUS EVERY 30 MIN PRN
Status: CANCELLED | OUTPATIENT
Start: 2021-04-26

## 2021-04-13 RX ORDER — HEPARIN SODIUM (PORCINE) LOCK FLUSH IV SOLN 100 UNIT/ML 100 UNIT/ML
5 SOLUTION INTRAVENOUS
Status: CANCELLED | OUTPATIENT
Start: 2021-04-26

## 2021-04-13 RX ORDER — ALBUTEROL SULFATE 90 UG/1
1-2 AEROSOL, METERED RESPIRATORY (INHALATION)
Status: CANCELLED
Start: 2021-04-26

## 2021-04-13 RX ORDER — HEPARIN SODIUM,PORCINE 10 UNIT/ML
5 VIAL (ML) INTRAVENOUS
Status: CANCELLED | OUTPATIENT
Start: 2021-04-26

## 2021-04-13 RX ORDER — NALOXONE HYDROCHLORIDE 0.4 MG/ML
.1-.4 INJECTION, SOLUTION INTRAMUSCULAR; INTRAVENOUS; SUBCUTANEOUS
Status: CANCELLED | OUTPATIENT
Start: 2021-04-26

## 2021-04-13 NOTE — PROGRESS NOTES
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 Christensen). Path: SCC, non keratinizing, p16 +gardenia  3/23/21 CT neck. 2.9 x 2.7 x 3.5 cm L tonsil fullness involving soft palate, level 2-3 neck nodes  4/2/21 PET/CT. 2.7 x 2.2 x 2.7 cm L palatine tonsil mass (SUV 24), extension to oral cavity, 3.4 x 2.0 cm L level 4 node invading SCM, 1.8 x 1.8 cm L level 2A/3 node (SUV 7.9) w/ECS, 1.4 x 0.9 cm R level 2A node (SUV 6.7)    ASSESSMENT AND PLAN  SCC L tonsil, [id number], p16 +gardenia, ECS +gardenia: We reviewed the rationale for adding chemotherapy as a radiosensitizer to radiation. Because of CKD with intermittent SURESH, I recommended weekly cisplatin instead of HD cisplatin for about 6 doses concurrently with radiation. We reviewed the potential side effects such as hearing loss/tinnitus, neuropathy, risk of infection which can sometimes be life threatening, SURESH, hypoK/mg, and N/V. Reviewed weekly visits in [facility] and weekly visits with Dr. Robbins. Has simulation scheduled tomorrow and will coordinate with Dr. Robbins's clinic but aim to start likely 4/26. Getting his fluoride trays tomorrow. He and Dr. Robbins have discussed a reactive PEG and he's motivated to eat. Video swallow scheduled 4/27. PRN antiemetics with compazine and ondansetron, can redose emend in clinic if issues arise.     Anticipated acute on chronic opioid use: Uses hydrocodone-acetaminophen daily at night to help with his sinuses, has had longstanding issues with that and has tried many many  interventions without success. He discussed his concern that we would hesitate to use opioids to manage mucositis-related pain. I reassured him that we would manage the pain with opioids and non-opioid measures as we usually do, and acknowledge that he might need more than someone who is opioid naive. If we're having trouble managing pain, we can involve our colleagues in Palliative Care. Oxycodone has not worked well for other pain in the past; I explained that we often use methadone in this situation. Hydromorphone is another option that comes as a liquid. He will continue to get hydrocodone-acetaminophen from his PCP. Discussed anticipation and expectation that he will be able to wean off additional opioids in about a month after chemoradiation is completed.    CKD: Discussed risk of SURESH with cisplatin. Will change to carbo if needed.     Anxiety: Uses alprazolam nightly along with zolpidem, which has worked pretty well. Asked him to let us know if anxiety is getting worse. Would rather avoid benzodiazepines to manage this. Avoid lorazepam.     ASCVD:  See note by Dr. Ch, Cardiology, from 10/20/20. Might need to add metoprolol if his BP is consistently >140/90. Would also decrease lisinopril if creatinine is a problem.     Exercise: He can continue going to the gym as he tolerates.     COVID: Has been vaccinated    70 minutes spent on the date of the encounter doing chart review, history and exam, documentation and further activities per the note     Shanthi Schrader MD  Associate Professor of Medicine  Hematology, Oncology and Transplantation      SUBJECTIVE  Mr. Murphy is a 71 yo male who presents today to establish care with me for newly diagnosed SCC L tonsil. He is accompanied on the video visit by his wife. He's doing ok overall physically. Still has a bit of sore throat after the tonsil bx. Eating and drinking fine. No weight loss, no dysphagia. Has some bladder spasms sometimes on a chronic basis, no  change from baseline, that started after the prostatectomy. Breathing fine. Goes to the gym 3 times per week.   Some trigeminal neuralgia on the left face intermittently - longstanding.   No other numbness/tingling  No hearing loss or tinnitus  Oxycodone tends not to work as well in the past.  No leg swelling, chest pain/pressure  No other symptoms     PAST MEDICAL HISTORY  SCC as above  ASCVD. Angina in 12/2016. [facility] [place] with proximal LAD stenosis, s/p PTCA and stent. Not on metoprolol due to hypotension. Ses Dr. Aramis Ch, Cardiologist.  CKD baseline ~ 1.2-1.3, up to 1.93 on 11/2/20  HTN  Dyslipidemia  H/o prostate ca s/p robotic prostatectomy about 16 years ago followed by Dr. Meir Torres at [facility] in [place]  Nephrolithasis  Anxiety, insomnia. Takes zolpidem and alprazolam nightly   OA  Chronic sinusitus. Takes chronic hydrocodone-acetaminophen for this  Inguinal hernia  GERD, hiatal hernia. Met with Dr. Adams 2/18/20, symptoms not due to hiatal hernia  ORIF R radius 2017  H/o retroperitoneal hematoma 10/2017. Admitted to [facility]. [place] to be related to lithotripsy 10 days prior  Hemorrhoids  PAD s/p R iliac artery stent after injury during [facility] [place]    CURRENT OUTPATIENT MEDICATIONS  Current Outpatient Medications   Medication Sig Dispense Refill     ALPRAZolam (XANAX) 0.5 MG tablet TAKE 1 TABLET (0.5 MG) BY MOUTH 4 TIMES DAILY AS NEEDED FOR ANXIETY 120 tablet 1     ASPIRIN ADULT LOW STRENGTH 81 MG EC tablet TAKE ONE TABLET BY MOUTH ONCE DAILY 90 tablet 1     citalopram (CELEXA) 40 MG tablet TAKE ONE TABLET BY MOUTH ONCE DAILY 30 tablet 8     HYDROcodone-acetaminophen (NORCO) 7.5-325 MG per tablet Take 1 tablet by mouth every 4 hours as needed for moderate to severe pain maximum 5 tablet(s) per day 150 tablet 0     hydrOXYzine (VISTARIL) 25 MG capsule TAKE 1 CAPSULE (25 MG) BY MOUTH 4 TIMES DAILY AS NEEDED FOR ANXIETY 120 capsule 3     metoprolol succinate ER (TOPROL-XL) 50 MG 24 hr tablet Take 1 tablet  "(50 mg) by mouth daily 90 tablet 3     nitroGLYcerin (NITROSTAT) 0.4 MG sublingual tablet For chest pain place 1 tablet under the tongue every 5 minutes for 3 doses. If symptoms persist 5 minutes after 1st dose call 911. 25 tablet 0     omeprazole (PRILOSEC) 40 MG DR capsule Take 1 capsule (40 mg) by mouth daily 90 capsule 3     oxyCODONE (ROXICODONE) 5 MG tablet Take 1 tablet (5 mg) by mouth every 6 hours as needed for pain 12 tablet 0     rosuvastatin (CRESTOR) 40 MG tablet Take 1 tablet (40 mg) by mouth daily 90 tablet 1     testosterone (ANDROGEL 1.62 % PUMP) 20.25 MG/ACT gel testosterone 20.25 mg/1.25 gram (1.62 %) transdermal gel pump       triamcinolone (KENALOG) 0.1 % external cream        zolpidem (AMBIEN) 10 MG tablet TAKE ONE TABLET BY MOUTH EVERY EVENING AS NEEDED FOR SLEEP 30 tablet 5     ALLERGIES  Allergies   Allergen Reactions     Animal Dander      Azithromycin Nausea and Vomiting     Dust Mites      Pollen Extract      Smoke.      SOCIAL HISTORY: . Wife is a nurse, Alessandra, retired, used to work in med-surg at the [employer]. Never smoker. Drinks beer regularly per notes.     FAMILY HISTORY:   Family History   Problem Relation Age of Onset     Hypertension Father         has had MI      Connective Tissue Disorder Mother         LUPUS     Heart Disease Mother         poor valve-needing replacement      REVIEW OF SYSTEMS  As above in the HPI, o/w complete 12-point ROS was negative.    PHYSICAL EXAM   4/2/2021   Weight 102 kg (224 lb 13.9 oz)   Height 1.74 m (5' 8.5\")   BSA (Calculated - sq m) 2.22   Temp 96.6  F (35.9  C)   Pulse 80     Wt Readings from Last 3 Encounters:   04/02/21 102 kg (224 lb 13.9 oz)   03/22/21 101.1 kg (222 lb 12.5 oz)   03/04/21 98.9 kg (218 lb)     GEN: NAD  HEENT: EOMI, no icterus, injection or pallor.  NEURO: alert    Remainder of physical exam deferred due to public health emergency and limitations of video visit.    LABORATORY AND IMAGING STUDIES  Results " "for CHUCHO MONTOYA (MRN [id number]) as of 4/13/2021 20:16   10/18/2020 09:50 11/2/2020 12:47 11/15/2020 11:55 11/25/2020 10:08 12/20/2020 13:01   Sodium 142 140 140  140   Potassium 3.8 4.6 3.9  4.2   Chloride 108 107 106  108   Carbon Dioxide 29 28 28  29   Urea Nitrogen 16 18 17  15   Creatinine 1.31 (H) 1.93 (H) 1.21  1.22   GFR Estimate 55 (L) 34 (L) 61  60 (L)   GFR Estimate If Black 64 40 (L) 70  69   Calcium 9.0 9.4 9.1  8.7     Labs were independently reviewed and interpreted by clair Paul Report Patient Name: CHUCHO MONTOYA   MR#: [id number]   Specimen #: [id number]   Collected: 3/22/2021   Received: 3/23/2021   Reported: 3/24/2021 16:11   Ordering Phy(s): MAMADOU GAVIN     For improved result formatting, select 'View Enhanced Report Format' under    Linked Documents section.     SPECIMEN(S):   Left tonsil biopsy     FINAL DIAGNOSIS:   Oropharynx, left tonsil: Biopsy:   - Nonkeratinizing squamous cell carcinoma, P16 positive     Electronically signed out by:     Terry Seo M.D.      CT Soft Tissue Neck w Contrast  Addendum: Addendum:   The first sentence in the impression should read as FDG avid left   \"tonsillary\" mass instead of \"axillary\".      MIKY SEXTON MD  Narrative: EXAM: PET CT fusion examination 4/2/2021  1. Neck CT with contrast  2. PET study of the neck  3. PET CT fusion study of the neck    Indication: Squamous cell carcinoma of tonsil (H), initial workup.    COMPARISON: CT 3/23/2021 and CTA 12/29/2019.    TECHNIQUE: Please refer to the accompanying whole body PET-CT for  report of the dose and whole body PET-CT findings.  Regarding the neck, axial images were obtained after nonionic  intravenous contrast administration, with sagittal and coronal  reconstructions performed. Neck CT images were reviewed in bone, soft  tissue, and lung windows, with review of the fused PET-CT images as  well in multiple planes.    FINDINGS:  FDG-avid lesion in the left palatine tonsil measuring 2.7 " x 2.2 x 2.7  cm (trans x AP x sag) with max SUV 24.0. There is minimal extension  into the oral cavity. There are multiple enlarged, FDG-avid cervical  lymph nodes as follows on series 5:    1. Location: Left level 4, image 135              Size: 3.4 x 2.0 cm, max uptake: 26.0 SUV. There is no fat  plane between this lymph node and the SCM muscle. There is evidence  for extracapsular extension and invasion of the SCM muscle.  2. Location: Left level 2A/3, image 181 with central necrosis             Size: 1.8 x 1.8 cm, max uptake: 7.9 SUV. There is evidence  for extracapsular extension and invasion of the SCM muscle.  3. Location: Right level 2A, image 109              Size: 1.4 x 0.9 cm, max uptake: 6.7 SUV    The remaining mucosal spaces are unremarkable. No abnormality of the  tongue base, major salivary glands, thyroid gland. No flow is seen  within the left internal jugular vein, similar to 12/29/2019.  Remaining major cervical vasculature is patent. The paranasal sinuses  and mastoid air cells are clear.     Please see separately dictated whole body PET/CT report for evaluation  of the thorax.  Impression: IMPRESSION:    1. FDG avid left axillary mass representing the biopsy-proven squamous  cell carcinoma.   2. Presumed metastatic, hypermetabolic left level 2/3 lymph nodes with  extracapsular extension and invasion of the SCM muscle. Single FDG  avid right level 2 lymph node suspicious for metastatic disease.    3. Please see separately dictated whole body PET/CT report for  evaluation of the thorax.    I have personally reviewed the examination and initial interpretation  and I agree with the findings.    MIKY SEXTON MD   Combined Report of:    PET and CT on  4/2/2021 :     1. PET of the neck, chest, abdomen, and pelvis.  2. PET CT Fusion for Attenuation Correction and Anatomical  Localization:    3. Diagnostic CT scan of the chest, abdomen, and pelvis with  intravenous contrast for interpretation.  3.  CT of the chest, abdomen and pelvis obtained for diagnostic  interpretation.  4. 3D MIP and PET-CT fused images were processed on an independent  workstation and archived to PACS and reviewed by a radiologist.     Technique:  1. PET: The patient received 13.19 mCi of F-18-FDG; the serum glucose  was 94 mg/dL prior to administration, body weight was 101.1 kg. Images  were evaluated in the axial, sagittal, and coronal planes as well as  the rotational whole body MIP. Images were acquired from the Vertex to  the Feet.     UPTAKE WAS MEASURED AT 63 MINUTES.      BACKGROUND:  Liver SUV max= 4.4,   Aorta Blood SUV Max: 3.2.      2. CT: Volumetric acquisition for clinical interpretation of the  chest, abdomen, and pelvis acquired at 3 mm sections  after the  uneventful administration of intravenous contrast. The chest, abdomen,  and pelvis were evaluated at 5 mm sections in bone, soft tissue, and  lung windows.       The patient received 135 cc. Of Isovue 370 intravenously for the  examination.    High resolution images of the neck were obtained with multiple oblique  projection reformats.     3. 3D MIP and PET-CT fused images were processed on an independent  workstation and archived to PACS and reviewed by a radiologist.     INDICATION: 70 year old male with scc of tonsil; Squamous cell  carcinoma of tonsil (H)     ADDITIONAL INFORMATION OBTAINED FROM EMR: Recently diagnosed P16+ SCC  of the left tonsil. Recent CT shows left level II and III  lymphadenopathy.      COMPARISON: CT 3/23/2021     FINDINGS:      Head, neck:  Please see dedicated neuroradiology report for findings of the  high-resolution PET/CT the neck.     Chest:  No suspicious FDG uptake in the chest.      No pathologically enlarged mediastinal, hilar or axillary lymph nodes.  No suspicious lung nodules or evidence for infection.   No significant  pericardial or plural effusions. Left coronary artery stent.  Nonaneurysmal thoracic aorta with mild  atherosclerosis.     Abdomen, pelvis:  No suspicious FDG uptake in the abdomen or pelvis.     No suspicious hepatic lesions. There is No splenomegaly or evidence  for splenic or pancreatic mass lesion. No suspicious adrenal mass  lesions or opaque gallbladder calculi.  There is symmetric  nephrographic renal phase without hydronephrosis. 8 mm calcified stone  in the dependent bladder. Colonic diverticulosis without evidence for  evidence for diverticulitis, bowel obstruction or free fluid.  Right  common iliac artery stent appears patent.     Lower extremities:   No abnormal masses or hypermetabolic lesions.     Bones:   No suspicious FDG uptake in the skeleton. No suspicious lytic or  blastic osseous lesions. Thoracolumbar scoliosis with convex right  thoracic component centered at T8 and convex left component centered  at L2.                                                                          IMPRESSION:      1. No evidence of metastatic disease in the chest, abdomen, or pelvis.  See dedicated neuroradiology report of high-resolution PET/CT the neck  for findings concerning cervical malignancy.     2. Incidental findings including 8 mm bladder stone, patent right  common iliac artery stent, and colon diverticulosis without evidence  of diverticulitis.     I have personally reviewed the examination and initial interpretation  and I agree with the findings.     ROHAN RAMIREZ MD    Imaging was personally reviewed and interpreted by me       Quan is a 70 year old who is being evaluated via a billable video visit.      How would you like to obtain your AVS? MyChart  If the video visit is dropped, the invitation should be resent by: Send to e-mail at: [email or web address]  Will anyone else be joining your video visit? No       I have reviewed and updated the patient's allergies and medication list.    Concerns: Wants to know if he's going to die, what is chemo and radiation like, how hard is it going to get  "to swallow - LOTS of questions  Refills: none     Vitals - Patient Reported  Weight (Patient Reported): 102.1 kg (225 lb)  Height (Patient Reported): 174 cm (5' 8.5\")  BMI (Based on Pt Reported Ht/Wt): 33.71    Darcie Tam CMA    Video Start Time: 1:33 PM    Video-Visit Details  Type of service:  Video Visit  Video End Time: 2:30 PM  Originating Location (pt. Location): Home  Distant Location (provider location):  [facility] CANCER [facility]   Platform used for Video Visit: [place]    "

## 2021-04-13 NOTE — LETTER
4/13/2021         RE: Quan Murphy  [address] [facility] [address]        Dear Colleague,    Thank you for referring your patient, Quan Murphy, to the [facility] CANCER CLINIC. Please see a copy of my visit note below.    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 Christensen). Path: SCC, non keratinizing, p16 +gardenia  3/23/21 CT neck. 2.9 x 2.7 x 3.5 cm L tonsil fullness involving soft palate, level 2-3 neck nodes  4/2/21 PET/CT. 2.7 x 2.2 x 2.7 cm L palatine tonsil mass (SUV 24), extension to oral cavity, 3.4 x 2.0 cm L level 4 node invading SCM, 1.8 x 1.8 cm L level 2A/3 node (SUV 7.9) w/ECS, 1.4 x 0.9 cm R level 2A node (SUV 6.7)    ASSESSMENT AND PLAN  SCC L tonsil, gP3R2C4, p16 +gardenia, ECS +gardenia: We reviewed the rationale for adding chemotherapy as a radiosensitizer to radiation. Because of CKD with intermittent SURESH, I recommended weekly cisplatin instead of HD cisplatin for about 6 doses concurrently with radiation. We reviewed the potential side effects such as hearing loss/tinnitus, neuropathy, risk of infection which can sometimes be life threatening, SURESH, hypoK/mg, and N/V. Reviewed weekly visits in [facility] and weekly visits with Dr. Robbins. Has simulation scheduled tomorrow and will coordinate with Dr. Robbins's clinic but aim to start likely 4/26. Getting his fluoride trays tomorrow. He and Dr. Robbins have discussed a reactive PEG and he's motivated to eat. Video swallow scheduled 4/27. PRN antiemetics with  compazine and ondansetron, can redose emend in clinic if issues arise.     Anticipated acute on chronic opioid use: Uses hydrocodone-acetaminophen daily at night to help with his sinuses, has had longstanding issues with that and has tried many many interventions without success. He discussed his concern that we would hesitate to use opioids to manage mucositis-related pain. I reassured him that we would manage the pain with opioids and non-opioid measures as we usually do, and acknowledge that he might need more than someone who is opioid naive. If we're having trouble managing pain, we can involve our colleagues in Palliative Care. Oxycodone has not worked well for other pain in the past; I explained that we often use methadone in this situation. Hydromorphone is another option that comes as a liquid. He will continue to get hydrocodone-acetaminophen from his PCP. Discussed anticipation and expectation that he will be able to wean off additional opioids in about a month after chemoradiation is completed.    CKD: Discussed risk of SURESH with cisplatin. Will change to carbo if needed.     Anxiety: Uses alprazolam nightly along with zolpidem, which has worked pretty well. Asked him to let us know if anxiety is getting worse. Would rather avoid benzodiazepines to manage this. Avoid lorazepam.     ASCVD:  See note by Dr. Ch, Cardiology, from 10/20/20. Might need to add metoprolol if his BP is consistently >140/90. Would also decrease lisinopril if creatinine is a problem.     Exercise: He can continue going to the gym as he tolerates.     COVID: Has been vaccinated    70 minutes spent on the date of the encounter doing chart review, history and exam, documentation and further activities per the note     Shanthi Schrader MD  Associate Professor of Medicine  Hematology, Oncology and Transplantation      SUBJECTIVE  Mr. Murphy is a 69 yo male who presents today to establish care with me for newly diagnosed SCC L tonsil.  He is accompanied on the video visit by his wife. He's doing ok overall physically. Still has a bit of sore throat after the tonsil bx. Eating and drinking fine. No weight loss, no dysphagia. Has some bladder spasms sometimes on a chronic basis, no change from baseline, that started after the prostatectomy. Breathing fine. Goes to the gym 3 times per week.   Some trigeminal neuralgia on the left face intermittently - longstanding.   No other numbness/tingling  No hearing loss or tinnitus  Oxycodone tends not to work as well in the past.  No leg swelling, chest pain/pressure  No other symptoms     PAST MEDICAL HISTORY  SCC as above  ASCVD. Angina in 12/2016. [facility] with proximal LAD stenosis, s/p PTCA and stent. Not on metoprolol due to hypotension. Ses Dr. Aramis Ch, Cardiologist.  CKD baseline ~ 1.2-1.3, up to 1.93 on 11/2/20  HTN  Dyslipidemia  H/o prostate ca s/p robotic prostatectomy about 16 years ago followed by Dr. Meir Torres at [facility] in [place]  Nephrolithasis  Anxiety, insomnia. Takes zolpidem and alprazolam nightly   OA  Chronic sinusitus. Takes chronic hydrocodone-acetaminophen for this  Inguinal hernia  GERD, hiatal hernia. Met with Dr. Adams 2/18/20, symptoms not due to hiatal hernia  ORIF R radius 2017  H/o retroperitoneal hematoma 10/2017. Admitted to [facility] [place] to be related to lithotripsy 10 days prior  Hemorrhoids  PAD s/p R iliac artery stent after injury during [facility]    CURRENT OUTPATIENT MEDICATIONS  Current Outpatient Medications   Medication Sig Dispense Refill     ALPRAZolam (XANAX) 0.5 MG tablet TAKE 1 TABLET (0.5 MG) BY MOUTH 4 TIMES DAILY AS NEEDED FOR ANXIETY 120 tablet 1     ASPIRIN ADULT LOW STRENGTH 81 MG EC tablet TAKE ONE TABLET BY MOUTH ONCE DAILY 90 tablet 1     citalopram (CELEXA) 40 MG tablet TAKE ONE TABLET BY MOUTH ONCE DAILY 30 tablet 8     HYDROcodone-acetaminophen (NORCO) 7.5-325 MG per tablet Take 1 tablet by mouth every 4 hours as needed for moderate to  "severe pain maximum 5 tablet(s) per day 150 tablet 0     hydrOXYzine (VISTARIL) 25 MG capsule TAKE 1 CAPSULE (25 MG) BY MOUTH 4 TIMES DAILY AS NEEDED FOR ANXIETY 120 capsule 3     metoprolol succinate ER (TOPROL-XL) 50 MG 24 hr tablet Take 1 tablet (50 mg) by mouth daily 90 tablet 3     nitroGLYcerin (NITROSTAT) 0.4 MG sublingual tablet For chest pain place 1 tablet under the tongue every 5 minutes for 3 doses. If symptoms persist 5 minutes after 1st dose call 911. 25 tablet 0     omeprazole (PRILOSEC) 40 MG DR capsule Take 1 capsule (40 mg) by mouth daily 90 capsule 3     oxyCODONE (ROXICODONE) 5 MG tablet Take 1 tablet (5 mg) by mouth every 6 hours as needed for pain 12 tablet 0     rosuvastatin (CRESTOR) 40 MG tablet Take 1 tablet (40 mg) by mouth daily 90 tablet 1     testosterone (ANDROGEL 1.62 % PUMP) 20.25 MG/ACT gel testosterone 20.25 mg/1.25 gram (1.62 %) transdermal gel pump       triamcinolone (KENALOG) 0.1 % external cream        zolpidem (AMBIEN) 10 MG tablet TAKE ONE TABLET BY MOUTH EVERY EVENING AS NEEDED FOR SLEEP 30 tablet 5     ALLERGIES  Allergies   Allergen Reactions     Animal Dander      Azithromycin Nausea and Vomiting     Dust Mites      Pollen Extract      Smoke.      SOCIAL HISTORY: . Wife is a nurse, Alessandra, retired, used to work in [employer] at the [employer] in [place]. Never smoker. Drinks beer regularly per notes.     FAMILY HISTORY:   Family History   Problem Relation Age of Onset     Hypertension Father         has had MI      Connective Tissue Disorder Mother         LUPUS     Heart Disease Mother         poor valve-needing replacement      REVIEW OF SYSTEMS  As above in the HPI, o/w complete 12-point ROS was negative.    PHYSICAL EXAM   4/2/2021   Weight 102 kg (224 lb 13.9 oz)   Height 1.74 m (5' 8.5\")   BSA (Calculated - sq m) 2.22   Temp 96.6  F (35.9  C)   Pulse 80     Wt Readings from Last 3 Encounters:   04/02/21 102 kg (224 lb 13.9 oz)   03/22/21 101.1 kg (222 lb 12.5 oz) " "  03/04/21 98.9 kg (218 lb)     GEN: NAD  HEENT: EOMI, no icterus, injection or pallor.  NEURO: alert    Remainder of physical exam deferred due to public health emergency and limitations of video visit.    LABORATORY AND IMAGING STUDIES  Results for CHUHCO MONTOYA (MRN [id number]) as of 4/13/2021 20:16   10/18/2020 09:50 11/2/2020 12:47 11/15/2020 11:55 11/25/2020 10:08 12/20/2020 13:01   Sodium 142 140 140  140   Potassium 3.8 4.6 3.9  4.2   Chloride 108 107 106  108   Carbon Dioxide 29 28 28  29   Urea Nitrogen 16 18 17  15   Creatinine 1.31 (H) 1.93 (H) 1.21  1.22   GFR Estimate 55 (L) 34 (L) 61  60 (L)   GFR Estimate If Black 64 40 (L) 70  69   Calcium 9.0 9.4 9.1  8.7     Labs were independently reviewed and interpreted by clair Paul Report Patient Name: CHUCHO MONTOYA   MR#: [id number]   Specimen #: [id number]   Collected: 3/22/2021   Received: 3/23/2021   Reported: 3/24/2021 16:11   Ordering Phy(s): MAMADOU GAVIN     For improved result formatting, select 'View Enhanced Report Format' under    Linked Documents section.     SPECIMEN(S):   Left tonsil biopsy     FINAL DIAGNOSIS:   Oropharynx, left tonsil: Biopsy:   - Nonkeratinizing squamous cell carcinoma, P16 positive     Electronically signed out by:     Terry Seo M.D.      CT Soft Tissue Neck w Contrast  Addendum: Addendum:   The first sentence in the impression should read as FDG avid left   \"tonsillary\" mass instead of \"axillary\".      MIKY SEXTON MD  Narrative: EXAM: PET CT fusion examination 4/2/2021  1. Neck CT with contrast  2. PET study of the neck  3. PET CT fusion study of the neck    Indication: Squamous cell carcinoma of tonsil (H), initial workup.    COMPARISON: CT 3/23/2021 and CTA 12/29/2019.    TECHNIQUE: Please refer to the accompanying whole body PET-CT for  report of the dose and whole body PET-CT findings.  Regarding the neck, axial images were obtained after nonionic  intravenous contrast administration, with sagittal and " coronal  reconstructions performed. Neck CT images were reviewed in bone, soft  tissue, and lung windows, with review of the fused PET-CT images as  well in multiple planes.    FINDINGS:  FDG-avid lesion in the left palatine tonsil measuring 2.7 x 2.2 x 2.7  cm (trans x AP x sag) with max SUV 24.0. There is minimal extension  into the oral cavity. There are multiple enlarged, FDG-avid cervical  lymph nodes as follows on series 5:    1. Location: Left level 4, image 135              Size: 3.4 x 2.0 cm, max uptake: 26.0 SUV. There is no fat  plane between this lymph node and the SCM muscle. There is evidence  for extracapsular extension and invasion of the SCM muscle.  2. Location: Left level 2A/3, image 181 with central necrosis             Size: 1.8 x 1.8 cm, max uptake: 7.9 SUV. There is evidence  for extracapsular extension and invasion of the SCM muscle.  3. Location: Right level 2A, image 109              Size: 1.4 x 0.9 cm, max uptake: 6.7 SUV    The remaining mucosal spaces are unremarkable. No abnormality of the  tongue base, major salivary glands, thyroid gland. No flow is seen  within the left internal jugular vein, similar to 12/29/2019.  Remaining major cervical vasculature is patent. The paranasal sinuses  and mastoid air cells are clear.     Please see separately dictated whole body PET/CT report for evaluation  of the thorax.  Impression: IMPRESSION:    1. FDG avid left axillary mass representing the biopsy-proven squamous  cell carcinoma.   2. Presumed metastatic, hypermetabolic left level 2/3 lymph nodes with  extracapsular extension and invasion of the SCM muscle. Single FDG  avid right level 2 lymph node suspicious for metastatic disease.    3. Please see separately dictated whole body PET/CT report for  evaluation of the thorax.    I have personally reviewed the examination and initial interpretation  and I agree with the findings.    MIKY SEXTON MD   Combined Report of:    PET and CT on   4/2/2021 :     1. PET of the neck, chest, abdomen, and pelvis.  2. PET CT Fusion for Attenuation Correction and Anatomical  Localization:    3. Diagnostic CT scan of the chest, abdomen, and pelvis with  intravenous contrast for interpretation.  3. CT of the chest, abdomen and pelvis obtained for diagnostic  interpretation.  4. 3D MIP and PET-CT fused images were processed on an independent  workstation and archived to PACS and reviewed by a radiologist.     Technique:  1. PET: The patient received 13.19 mCi of F-18-FDG; the serum glucose  was 94 mg/dL prior to administration, body weight was 101.1 kg. Images  were evaluated in the axial, sagittal, and coronal planes as well as  the rotational whole body MIP. Images were acquired from the Vertex to  the Feet.     UPTAKE WAS MEASURED AT 63 MINUTES.      BACKGROUND:  Liver SUV max= 4.4,   Aorta Blood SUV Max: 3.2.      2. CT: Volumetric acquisition for clinical interpretation of the  chest, abdomen, and pelvis acquired at 3 mm sections  after the  uneventful administration of intravenous contrast. The chest, abdomen,  and pelvis were evaluated at 5 mm sections in bone, soft tissue, and  lung windows.       The patient received 135 cc. Of Isovue 370 intravenously for the  examination.    High resolution images of the neck were obtained with multiple oblique  projection reformats.     3. 3D MIP and PET-CT fused images were processed on an independent  workstation and archived to PACS and reviewed by a radiologist.     INDICATION: 70 year old male with scc of tonsil; Squamous cell  carcinoma of tonsil (H)     ADDITIONAL INFORMATION OBTAINED FROM EMR: Recently diagnosed P16+ SCC  of the left tonsil. Recent CT shows left level II and III  lymphadenopathy.      COMPARISON: CT 3/23/2021     FINDINGS:      Head, neck:  Please see dedicated neuroradiology report for findings of the  high-resolution PET/CT the neck.     Chest:  No suspicious FDG uptake in the chest.      No  pathologically enlarged mediastinal, hilar or axillary lymph nodes.  No suspicious lung nodules or evidence for infection.   No significant  pericardial or plural effusions. Left coronary artery stent.  Nonaneurysmal thoracic aorta with mild atherosclerosis.     Abdomen, pelvis:  No suspicious FDG uptake in the abdomen or pelvis.     No suspicious hepatic lesions. There is No splenomegaly or evidence  for splenic or pancreatic mass lesion. No suspicious adrenal mass  lesions or opaque gallbladder calculi.  There is symmetric  nephrographic renal phase without hydronephrosis. 8 mm calcified stone  in the dependent bladder. Colonic diverticulosis without evidence for  evidence for diverticulitis, bowel obstruction or free fluid.  Right  common iliac artery stent appears patent.     Lower extremities:   No abnormal masses or hypermetabolic lesions.     Bones:   No suspicious FDG uptake in the skeleton. No suspicious lytic or  blastic osseous lesions. Thoracolumbar scoliosis with convex right  thoracic component centered at T8 and convex left component centered  at L2.                                                                          IMPRESSION:      1. No evidence of metastatic disease in the chest, abdomen, or pelvis.  See dedicated neuroradiology report of high-resolution PET/CT the neck  for findings concerning cervical malignancy.     2. Incidental findings including 8 mm bladder stone, patent right  common iliac artery stent, and colon diverticulosis without evidence  of diverticulitis.     I have personally reviewed the examination and initial interpretation  and I agree with the findings.     ROHAN RAMIREZ MD    Imaging was personally reviewed and interpreted by me       Again, thank you for allowing me to participate in the care of your patient.      Sincerely,    Shanthi Schrader MD

## 2021-04-14 ENCOUNTER — OFFICE VISIT (OUTPATIENT)
Dept: RADIATION ONCOLOGY | Facility: CLINIC | Age: 71
End: 2021-04-14
Attending: RADIOLOGY
Payer: MEDICARE

## 2021-04-14 DIAGNOSIS — C09.9 SQUAMOUS CELL CARCINOMA OF LEFT TONSIL (H): Primary | ICD-10-CM

## 2021-04-14 DIAGNOSIS — C09.9 SQUAMOUS CELL CARCINOMA OF LEFT TONSIL (H): ICD-10-CM

## 2021-04-14 DIAGNOSIS — R13.10 DYSPHAGIA: Primary | ICD-10-CM

## 2021-04-14 PROCEDURE — 77334 RADIATION TREATMENT AID(S): CPT | Mod: 26 | Performed by: RADIOLOGY

## 2021-04-14 PROCEDURE — 77263 THER RADIOLOGY TX PLNG CPLX: CPT | Performed by: RADIOLOGY

## 2021-04-14 PROCEDURE — 77334 RADIATION TREATMENT AID(S): CPT | Performed by: RADIOLOGY

## 2021-04-14 NOTE — LETTER
4/14/2021         RE: Quan Murphy  [address] [facility] [address]        Dear Colleague,    Thank you for referring your patient, Quan Murphy, to the [facility] RADIATION ONCOLOGY. Please see a copy of my visit note below.    Treatment Planning      Again, thank you for allowing me to participate in the care of your patient.        Sincerely,        [facility] Rad Onc Nurse

## 2021-04-14 NOTE — LETTER
4/14/2021         RE: Quan Murphy  [address] [facility] [address]        Dear Colleague,    Thank you for referring your patient, Quan Murphy, to the [facility] RADIATION ONCOLOGY. Please see a copy of my visit note below.    Radiation Therapy Patient Education    Person involved with teaching: Patient    Patient educational needs for self management of treatment-related side effects assessment completed.  EPIC Patient Ed tab contains Patient Learning Assessment    Education Materials Given  Radiation Therapy for Head and Neck    Educational Topics Discussed  Side effects expected, Pain management, Skin care, Nutrition and weight loss and When to call MD/RN    Response To Teaching  Verbalizes understanding    GYN Only  Vaginal Dilator-given and educated: N/A    Referrals sent: Dental, Speech and Swallowing and Nutrition and Palliative    Chemotherapy?  Yes, medical oncology notified of start date 04/28/20 thru 0614/21    Spent a lot of time talking about pain management and the role of palliative care.        Again, thank you for allowing me to participate in the care of your patient.        Sincerely,        Ahmet Robbins MD

## 2021-04-14 NOTE — LETTER
4/14/2021      RE: Quan Murphy  [address]  [facility] [address]       Treatment Planning      [facility] Rad Onc Nurse

## 2021-04-14 NOTE — PROGRESS NOTES
Radiation Therapy Patient Education    Person involved with teaching: Patient    Patient educational needs for self management of treatment-related side effects assessment completed.  [facility] Patient Ed tab contains Patient Learning Assessment    Education Materials Given  Radiation Therapy for Head and Neck    Educational Topics Discussed  Side effects expected, Pain management, Skin care, Nutrition and weight loss and When to call MD/RN    Response To Teaching  Verbalizes understanding    GYN Only  Vaginal Dilator-given and educated: N/A    Referrals sent: Dental, Speech and Swallowing and Nutrition and Palliative    Chemotherapy?  Yes, medical oncology notified of start date 04/28/20 thru 0614/21    Spent a lot of time talking about pain management and the role of palliative care.

## 2021-04-16 ENCOUNTER — VIRTUAL VISIT (OUTPATIENT)
Dept: ONCOLOGY | Facility: CLINIC | Age: 71
End: 2021-04-16
Attending: RADIOLOGY
Payer: MEDICARE

## 2021-04-16 DIAGNOSIS — N18.2 CKD (CHRONIC KIDNEY DISEASE) STAGE 2, GFR 60-89 ML/MIN: ICD-10-CM

## 2021-04-16 DIAGNOSIS — C09.9 SQUAMOUS CELL CARCINOMA OF TONSIL (H): Primary | ICD-10-CM

## 2021-04-16 PROCEDURE — 97802 MEDICAL NUTRITION INDIV IN: CPT | Mod: TEL | Performed by: DIETITIAN, REGISTERED

## 2021-04-16 NOTE — LETTER
"    4/16/2021         RE: Quan Murphy  [address] [facility] [address]        Dear Colleague,    Thank you for referring your patient, Quan Murphy, to the [facility] CANCER CLINIC. Please see a copy of my visit note below.    The patient has been notified of the following:      \"We have found that certain health care needs can be provided without the need for a face to face visit.  This service lets us provide the care you need with a phone conversation.       I will have full access to your [facility] medical record during this entire phone call.   I will be taking notes for your medical record.      Since this is like an office visit, we will bill your insurance company for this service.       There are potential benefits and risks of telephone visits (e.g. limits to patient confidentiality) that differ from in-person visits.?  Confidentiality still applies for telephone services, and nobody will record the visit.  It is important to be in a quiet, private space that is free of distractions (including cell phone or other devices) during the visit.??      If during the course of the call I believe a telephone visit is not appropriate, you will not be charged for this service\"     Consent has been obtained for this service by care team member: Yes     CLINICAL NUTRITION SERVICES - ASSESSMENT NOTE    Quan Murphy 70 year old referred for MNT related to SCC of tonsil     Time Spent: 45 minutes  Visit Type: phone  Referring Physician: Itzel Christian accompanied by: self     NUTRITION HISTORY  Factors affecting nutrition intake include: none at this time  Current diet/appetite: Good - general diet  Chemotherapy: Starting 4/28 - LD Cisplatin  Radiation: Starting 4/28    Quan denies barriers to eating and drinking at this time.   He has obtained some food and nutritional supplement resources in preparation for upcoming treatment.  He has purchased [facility] ermias powder and powerade sports drink.   He is " hoping to prevent having to get a feeding tube.     ANTHROPOMETRICS  Height: 68  Weight: 224 lbs/102kg  BMI: 33  Weight Status:  Obesity Grade I BMI 30-34.9  IBW: 156 lbs (143%)  Weight History:   Wt Readings from Last 6 Encounters:   04/02/21 102 kg (224 lb 13.9 oz)   03/22/21 101.1 kg (222 lb 12.5 oz)   03/04/21 98.9 kg (218 lb)   10/20/20 99 kg (218 lb 4.8 oz)   07/15/20 99.3 kg (219 lb)   02/18/20 98.1 kg (216 lb 4.8 oz)       Dosing Weight: 78kg (adjusted for obesity)    Medications/vitamins/minerals/herbals:   Reviewed    Labs:   Labs reviewed    NUTRITION FOCUSED PHYSICAL ASSESSMENT FOR DIAGNOSING MALNUTRITION:  Not observed due to Covid 19 pandemic - no clinic contact  Consult for education only      ASSESSED NUTRITION NEEDS:  Estimated Energy Needs: >2400 kcals (30 Kcal/Kg)  Justification: maintenance  Estimated Protein Needs: 100 grams protein (1.2 g pro/Kg)  Justification: increased needs impending CRT treatment  Estimated Fluid Needs: >3000  mL   Justification: increased needs impending chemo (cisplatin) with CKD    NUTRITION DIAGNOSIS:  Predicted inadequate nutrient intake related to cancer treatment to head/neck region     INTERVENTIONS  Provided written & verbal education:   - Discussed strategies to help fortify meals and snacks.  - Encouraged to focus on smaller, more frequent meals.  Encouraged to have a protein source with each meal and snack.   Reviewed sources of protein.   - Reviewed nutrition and hydration needs. Advised pt to aim for at least >2400kcal and >100g protein daily. Advised pt to aim for 12 to 14 cups non-caffeine containing beverages daily. Discussed importance of fluids with CKD and chemotherapy.   - Reviewed common barriers to eating with cancer treatment.  Discussed ways to cope with mucositis and dysgeusia.   - Reviewed oral nutrition supplement options (Mass Pro weight ermias, CIB with [employer] milk etc). Encouraged utilizing these ONS in home made shakes/smoothies to prevent  flavor fatigue.      Provided pt with corresponding education materials/handouts on:  High Calorie/High Protein Recipe booklet, Tips to Increase the Calories in Your Diet, Sources of Protein    Pt verbalize understanding of materials provided during consult.   Patient Understanding: Excellent  Expected patient engagement: Excellent     Goals  1.  Aim for 5-6 small frequent meals  2.  Aim for 2400kcal and 100g protein/day  3. Weight maintenance     Follow-Up Plans: Pt has RD contact information for questions.      MONITORING AND EVALUATION:  -Food/beverage intake  -Weight trends    Crystal Ramos RD, LD          Again, thank you for allowing me to participate in the care of your patient.        Sincerely,        Crystal Ramos RD

## 2021-04-16 NOTE — PROGRESS NOTES
"The patient has been notified of the following:      \"We have found that certain health care needs can be provided without the need for a face to face visit.  This service lets us provide the care you need with a phone conversation.       I will have full access to your [facility] medical record during this entire phone call.   I will be taking notes for your medical record.      Since this is like an office visit, we will bill your insurance company for this service.       There are potential benefits and risks of telephone visits (e.g. limits to patient confidentiality) that differ from in-person visits.?  Confidentiality still applies for telephone services, and nobody will record the visit.  It is important to be in a quiet, private space that is free of distractions (including cell phone or other devices) during the visit.??      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   He has obtained some food and nutritional supplement resources in preparation for upcoming treatment.  He has purchased [employer] ermias powder and powerade sports drink.   He is hoping to prevent having to get a feeding tube.     ANTHROPOMETRICS  Height: 68  Weight: 224 lbs/102kg  BMI: 33  Weight Status:  Obesity Grade I BMI 30-34.9  IBW: 156 lbs (143%)  Weight History:   Wt Readings from Last 6 Encounters:   04/02/21 102 kg " (224 lb 13.9 oz)   03/22/21 101.1 kg (222 lb 12.5 oz)   03/04/21 98.9 kg (218 lb)   10/20/20 99 kg (218 lb 4.8 oz)   07/15/20 99.3 kg (219 lb)   02/18/20 98.1 kg (216 lb 4.8 oz)       Dosing Weight: 78kg (adjusted for obesity)    Medications/vitamins/minerals/herbals:   Reviewed    Labs:   Labs reviewed    NUTRITION FOCUSED PHYSICAL ASSESSMENT FOR DIAGNOSING MALNUTRITION:  Not observed due to Covid 19 pandemic - no clinic contact  Consult for education only      ASSESSED NUTRITION NEEDS:  Estimated Energy Needs: >2400 kcals (30 Kcal/Kg)  Justification: maintenance  Estimated Protein Needs: 100 grams protein (1.2 g pro/Kg)  Justification: increased needs impending CRT treatment  Estimated Fluid Needs: >3000  mL   Justification: increased needs impending chemo (cisplatin) with CKD    NUTRITION DIAGNOSIS:  Predicted inadequate nutrient intake related to cancer treatment to head/neck region     INTERVENTIONS  Provided written & verbal education:   - Discussed strategies to help fortify meals and snacks.  - Encouraged to focus on smaller, more frequent meals.  Encouraged to have a protein source with each meal and snack.   Reviewed sources of protein.   - Reviewed nutrition and hydration needs. Advised pt to aim for at least >2400kcal and >100g protein daily. Advised pt to aim for 12 to 14 cups non-caffeine containing beverages daily. Discussed importance of fluids with CKD and chemotherapy.   - Reviewed common barriers to eating with cancer treatment.  Discussed ways to cope with mucositis and dysgeusia.   - Reviewed oral nutrition supplement options (Mass [employer] weight ermias, CIB with [employer] milk etc). Encouraged utilizing these ONS in home made shakes/smoothies to prevent flavor fatigue.      Provided pt with corresponding education materials/handouts on:  High Calorie/High Protein Recipe booklet, Tips to Increase the Calories in Your Diet, Sources of Protein    Pt verbalize understanding of materials provided during  consult.   Patient Understanding: Excellent  Expected patient engagement: Excellent     Goals  1.  Aim for 5-6 small frequent meals  2.  Aim for 2400kcal and 100g protein/day  3. Weight maintenance     Follow-Up Plans: Pt has RD contact information for questions.      MONITORING AND EVALUATION:  -Food/beverage intake  -Weight trends    Crystal Ramos RD, LD

## 2021-04-27 ENCOUNTER — THERAPY VISIT (OUTPATIENT)
Dept: SPEECH THERAPY | Facility: CLINIC | Age: 71
End: 2021-04-27
Attending: OTOLARYNGOLOGY
Payer: MEDICARE

## 2021-04-27 ENCOUNTER — TELEPHONE (OUTPATIENT)
Dept: ONCOLOGY | Facility: CLINIC | Age: 71
End: 2021-04-27

## 2021-04-27 ENCOUNTER — ANCILLARY PROCEDURE (OUTPATIENT)
Dept: GENERAL RADIOLOGY | Facility: CLINIC | Age: 71
End: 2021-04-27
Attending: OTOLARYNGOLOGY
Payer: MEDICARE

## 2021-04-27 DIAGNOSIS — C09.9 SQUAMOUS CELL CARCINOMA OF TONSIL (H): ICD-10-CM

## 2021-04-27 DIAGNOSIS — R13.12 OROPHARYNGEAL DYSPHAGIA: Primary | ICD-10-CM

## 2021-04-27 PROCEDURE — 92610 EVALUATE SWALLOWING FUNCTION: CPT | Mod: GN | Performed by: SPEECH-LANGUAGE PATHOLOGIST

## 2021-04-27 PROCEDURE — 92611 MOTION FLUOROSCOPY/SWALLOW: CPT | Mod: GN | Performed by: SPEECH-LANGUAGE PATHOLOGIST

## 2021-04-27 PROCEDURE — 92526 ORAL FUNCTION THERAPY: CPT | Mod: GN | Performed by: SPEECH-LANGUAGE PATHOLOGIST

## 2021-04-27 PROCEDURE — 74230 X-RAY XM SWLNG FUNCJ C+: CPT | Mod: GC | Performed by: RADIOLOGY

## 2021-04-27 RX ORDER — ONDANSETRON 8 MG/1
8 TABLET, ORALLY DISINTEGRATING ORAL EVERY 8 HOURS PRN
Qty: 30 TABLET | Refills: 11 | Status: SHIPPED | OUTPATIENT
Start: 2021-04-27 | End: 2021-06-28

## 2021-04-27 RX ORDER — PROCHLORPERAZINE MALEATE 10 MG
5 TABLET ORAL EVERY 6 HOURS PRN
Qty: 30 TABLET | Refills: 11 | Status: SHIPPED | OUTPATIENT
Start: 2021-04-27 | End: 2021-06-28

## 2021-04-27 RX ORDER — BARIUM SULFATE 400 MG/ML
30 SUSPENSION ORAL ONCE
Status: COMPLETED | OUTPATIENT
Start: 2021-04-27 | End: 2021-04-27

## 2021-04-27 RX ADMIN — BARIUM SULFATE 30 ML: 400 SUSPENSION ORAL at 11:16

## 2021-04-27 NOTE — PROGRESS NOTES
Speech-Language Pathology Department   EVALUATION  [facility]  Video Swallow Study Evaluation    04/27/21 1100   General Information   Type Of Visit Initial   Start Of Care Date 04/27/21   Referring Physician Dr. Ligia Fang   Orders Evaluate And Treat   Orders Comment Video Swallow Study   Medical Diagnosis SCC left tonsil   Onset Of Illness/injury Or Date Of Surgery 04/27/21   Precautions/limitations No Known Precautions/limitations   Hearing Functional in 1:1 setting   Pertinent History of Current Problem/OT: Additional Occupational Profile Info Quan Murphy is a 70 year old male with a [id number] p16+ SCC of the left tonsil. He is about to begin definitive chemoXRT. He will not be getting a prophylactic PEG. He presents today for baseline video swallow study accompanied by his wife. Endorses throat discomfort and odynophagia. Denies modifying his diet, coughing/TC or feeling of stasis with PO.   Respiratory Status Room air   Prior Level Of Function Swallowing   Prior Level Of Function Comment Regular textures and thin liquids   General Observations Pt is pleasant and cooperative throughout evaluation. Reports being nervous about starting treatment.   Patient/family Goals To avoid a feeding tube during chemoXRT   General Information Comments Pt is accompanied today by his wife   Clinical Swallow Evaluation   Oral Musculature generally intact   Structural Abnormalities present  (tumor observed originating from left tonsil)   Dentition present and adequate   Mucosal Quality adequate   Mandibular Strength and Mobility intact   Oral Labial Strength and Mobility WFL   Lingual Strength and Mobility WFL   Velar Elevation intact   Buccal Strength and Mobility intact   Laryngeal Function Cough;Swallow;Voicing initiated   Oral Musculature Comments Tumor observed originating from left tonsil; otherwise WFL   Additional evaluation(s) completed today Yes   Rationale for  completing additional evaluation To further assess pharyngeal phase   VFSS Evaluation   VFSS Additional Documentation Yes   VFSS Eval: Radiology   Radiologist Resident   Views Taken left lateral;A/P   Physical Location of Procedure [facility]   VFSS Eval: Thin Liquid Texture Trial   Mode of Presentation, Thin Liquid cup;self-fed   Order of Presentation Series 1,2,8,9,11  (9-tablet, 11-AP)   Preparatory Phase WFL   Oral Phase, Thin Liquid Premature pharyngeal entry   Pharyngeal Phase, Thin Liquid Residue in valleculae;Pharyngeal wall coating;Residue in pyriform sinus  (trace)   Rosenbek's Penetration Aspiration Scale: Thin Liquid Trial Results 2 - contrast enters airway, remains above the vocal cords, no residue remains (penetration)   Diagnostic Statement Intermittent penetration with no remaining residuals in laryngeal vestibule. No aspiration. Trace vallecular, piriform and posterior pharyngeal wall residue. Barium tablet passed without difficulty. In AP, premature spillage to the left piriform is observed.    VFSS Eval: Nectar Thick Liquid Texture Trial   Mode of Presentation, Nectar cup;self-fed   Order of Presentation Series 3   Preparatory Phase WFL   Oral Phase, Nectar WFL   Pharyngeal Phase, Nectar Pharyngeal wall coating;Residue in valleculae  (trace)   Rosenbek's Penetration Aspiration Scale: Nectar-Thick Liquid Trial Results 1 - no aspiration, contrast does not enter airway   Diagnostic Statement No penetration/aspiration. Trace posterior pharyngeal wall and vallecular residue.   VFSS Eval: Puree Solid Texture Trial   Mode of Presentation, Puree spoon;self-fed   Order of Presentation Series 4,5,10  (10-AP)   Preparatory Phase WFL   Oral Phase, Puree WFL   Pharyngeal Phase, Puree WFL   Rosenbek's Penetration Aspiration Scale: Puree Food Trial Results 1 - no aspiration, contrast does not enter airway   Diagnostic Statement No penetration/aspiration or significant residuals. AP reveals left preference.     VFSS Eval: Solid Food Texture Trial   Mode of Presentation, Solid self-fed   Order of Presentation Series 6,7   Preparatory Phase WFL   Oral Phase, Solid WFL   Pharyngeal Phase, Solid WFL   Rosenbek's Penetration Aspiration Scale: Solid Food Trial Results 1 - no aspiration, contrast does not enter airway   Diagnostic Statement No penetration/aspiration or significant residuals.    Swallow Compensations   Swallow Compensations No compensations were used   Educational Assessment   Barriers to Learning No barriers   General Therapy Interventions   Planned Therapy Interventions Dysphagia Treatment   Dysphagia treatment Oropharyngeal exercise training;Modified diet education;Instruction of safe swallow strategies;Compensatory strategies for swallowing   Swallow Eval: Clinical Impressions   Skilled Criteria for Therapy Intervention Skilled criteria met.  Treatment indicated.   Dysphagia Outcome Severity Scale (RIVAS) Level 5 - RIVAS   Treatment Diagnosis Mild oropharyngeal dysphagia expected to worsen to moderately severe during XRT   Diet texture recommendations Regular diet;Thin liquids   Recommended Feeding/Eating Techniques alternate between small bites and sips of food/liquid;maintain upright posture during/after eating for 30 mins;small sips/bites   Rehab Potential good, to achieve stated therapy goals   Predicted Duration of Therapy Intervention (days/wks) 2x/month for 6 months   Anticipated Discharge Disposition home w/ outpatient services   Risks and Benefits of Treatment have been explained. Yes   Patient, family and/or staff in agreement with Plan of Care Yes   Clinical Impression Comments Pt presents today with Mild oropharyngeal dysphagia expected to worsen to moderately severe during XRT.Tumor observed originating from left tonsil; oral motor examination is otherwise WFL. Prompt swallow response with adequate airway protection. Intermittent penetration with thin liquids with no remaining residuals in  laryngeal vestibule. No other penetration or any aspiration throughout the study. With liquids, trace residuals in valleculae, piriforms and on posterior pharyngeal wall. Barium tablet passed without difficulty. In AP, premature spillage to left piriform sinus with thin liquids is observed, as well as a left preference with puree textures. Recommend pt continue with regular, moist textures and thin liquids with use of safe swallow strategies. Trained pt and his wife re: anatomy/physiology of swallowing, results of today's study, and diet recommendations. Pt will benefit from ongoing SLP services to maximize swallow function during XRT and reduce risk of XRT induced fibrosis and subsequent risk of progressive dysphagia.    Swallow Goals   SLP Swallow Goals 1;2   Swallow Goal 1   Goal Identifier Diet   Goal Description 1. Pt will tolerate regular textures and thin liquids with no overt clinical s/sx of penetration/aspiration in 100% of PO trials.    Target Date 07/26/21   Swallow Goal 2   Goal Identifier Exercises   Goal Description 2. Pt will complete 10 reps of 5/5 oropharyngeal and jaw strengthening/ROM exercises with minimal written and/or verbal cues.    Target Date 07/26/21   Total Session Time   SLP Eval: oral/pharyngeal swallow function, clinical minutes (61729) 12   SLP Eval: VideoFluoroscopic Swallow function Minutes (97877) 15   Total Evaluation Time 27   Therapy Certification   Certification date from 04/27/21   Certification date to 07/26/21   Medical Diagnosis Oropharyngeal dysphagia   Certification I certify the need for these services furnished under this plan of treatment and while under my care.  (Physician co-signature of this document indicates review and certification of the therapy plan).     Thank you for the referral of Quan Murphy. If you have any questions about this report, please contact me using the information below.     DUSTY Vaca. (holland), MA, CCC-SLP   Speech Language  Pathologist  RODY Trained Vocologist   [facility]  Dept. of Otolaryngology  Department of Rehabilitation Services  [address] [place] [id number]  Email: [email or web address]   Pronouns: she/her/hers

## 2021-04-27 NOTE — TELEPHONE ENCOUNTER
Prior Authorization Approval    Authorization Effective Date: 4/27/2021  Authorization Expiration Date: 4/27/2022  Medication: Ondansetron - APPROVED  Approved Dose/Quantity: 30/10  Reference #:     Insurance Company: [employer] Part D - Phone [phone] Fax [phone]  Expected CoPay:       CoPay Card Available:      Foundation Assistance Needed:    Which Pharmacy is filling the prescription (Not needed for infusion/clinic administered):    Pharmacy Notified:    Patient Notified:          Samantha Francois CPhT  [facility]  Oncology Pharmacy Liaison  [email or web address]  Phone: [phone]  Fax: [phone]

## 2021-04-27 NOTE — TELEPHONE ENCOUNTER
PA Initiation    Medication: Ondansetron - Submitted  Insurance Company: [employer] Part D - Phone [phone] Fax [phone]  Pharmacy Filling the Rx:    Filling Pharmacy Phone:    Filling Pharmacy Fax:    Start Date: 4/27/2021        Samantha Francois CPhT  [facility]  Oncology Pharmacy Liaison  [email or web address]  Phone: [phone]  Fax: [phone]

## 2021-04-28 ENCOUNTER — APPOINTMENT (OUTPATIENT)
Dept: RADIATION ONCOLOGY | Facility: CLINIC | Age: 71
End: 2021-04-28
Attending: RADIOLOGY
Payer: MEDICARE

## 2021-04-28 ENCOUNTER — INFUSION THERAPY VISIT (OUTPATIENT)
Dept: ONCOLOGY | Facility: CLINIC | Age: 71
End: 2021-04-28
Attending: INTERNAL MEDICINE
Payer: MEDICARE

## 2021-04-28 VITALS — BODY MASS INDEX: 33.95 KG/M2 | HEIGHT: 69 IN

## 2021-04-28 VITALS
BODY MASS INDEX: 33.95 KG/M2 | HEART RATE: 94 BPM | RESPIRATION RATE: 16 BRPM | OXYGEN SATURATION: 96 % | TEMPERATURE: 97.2 F | WEIGHT: 226.6 LBS | DIASTOLIC BLOOD PRESSURE: 95 MMHG | SYSTOLIC BLOOD PRESSURE: 143 MMHG

## 2021-04-28 DIAGNOSIS — C09.9 SQUAMOUS CELL CARCINOMA OF LEFT TONSIL (H): Primary | ICD-10-CM

## 2021-04-28 DIAGNOSIS — E83.42 HYPOMAGNESEMIA: ICD-10-CM

## 2021-04-28 DIAGNOSIS — F41.9 ANXIETY: ICD-10-CM

## 2021-04-28 LAB
ALBUMIN SERPL-MCNC: 3.5 G/DL (ref 3.4–5)
ALP SERPL-CCNC: 99 U/L (ref 40–150)
ALT SERPL W P-5'-P-CCNC: 86 U/L (ref 0–70)
ANION GAP SERPL CALCULATED.3IONS-SCNC: 9 MMOL/L (ref 3–14)
AST SERPL W P-5'-P-CCNC: 59 U/L (ref 0–45)
AST SERPL W P-5'-P-CCNC: ABNORMAL U/L (ref 0–45)
BASOPHILS # BLD AUTO: 0 10E9/L (ref 0–0.2)
BASOPHILS NFR BLD AUTO: 0.6 %
BILIRUB SERPL-MCNC: 0.7 MG/DL (ref 0.2–1.3)
BUN SERPL-MCNC: 23 MG/DL (ref 7–30)
CALCIUM SERPL-MCNC: 8.9 MG/DL (ref 8.5–10.1)
CHLORIDE SERPL-SCNC: 105 MMOL/L (ref 94–109)
CO2 SERPL-SCNC: 23 MMOL/L (ref 20–32)
CREAT SERPL-MCNC: 1.03 MG/DL (ref 0.66–1.25)
DIFFERENTIAL METHOD BLD: NORMAL
EOSINOPHIL # BLD AUTO: 0.1 10E9/L (ref 0–0.7)
EOSINOPHIL NFR BLD AUTO: 1.5 %
ERYTHROCYTE [DISTWIDTH] IN BLOOD BY AUTOMATED COUNT: 14.2 % (ref 10–15)
GFR SERPL CREATININE-BSD FRML MDRD: 73 ML/MIN/{1.73_M2}
GLUCOSE SERPL-MCNC: 101 MG/DL (ref 70–99)
HCT VFR BLD AUTO: 43.4 % (ref 40–53)
HGB BLD-MCNC: 14.4 G/DL (ref 13.3–17.7)
IMM GRANULOCYTES # BLD: 0 10E9/L (ref 0–0.4)
IMM GRANULOCYTES NFR BLD: 0.2 %
LYMPHOCYTES # BLD AUTO: 1.6 10E9/L (ref 0.8–5.3)
LYMPHOCYTES NFR BLD AUTO: 30.1 %
MAGNESIUM SERPL-MCNC: 2 MG/DL (ref 1.6–2.3)
MCH RBC QN AUTO: 27.6 PG (ref 26.5–33)
MCHC RBC AUTO-ENTMCNC: 33.2 G/DL (ref 31.5–36.5)
MCV RBC AUTO: 83 FL (ref 78–100)
MONOCYTES # BLD AUTO: 0.6 10E9/L (ref 0–1.3)
MONOCYTES NFR BLD AUTO: 10.6 %
NEUTROPHILS # BLD AUTO: 3.1 10E9/L (ref 1.6–8.3)
NEUTROPHILS NFR BLD AUTO: 57 %
NRBC # BLD AUTO: 0 10*3/UL
NRBC BLD AUTO-RTO: 0 /100
PLATELET # BLD AUTO: 159 10E9/L (ref 150–450)
POTASSIUM SERPL-SCNC: 4.8 MMOL/L (ref 3.4–5.3)
PROT SERPL-MCNC: 7.4 G/DL (ref 6.8–8.8)
RBC # BLD AUTO: 5.21 10E12/L (ref 4.4–5.9)
SODIUM SERPL-SCNC: 137 MMOL/L (ref 133–144)
WBC # BLD AUTO: 5.5 10E9/L (ref 4–11)

## 2021-04-28 PROCEDURE — 84460 ALANINE AMINO (ALT) (SGPT): CPT | Performed by: PHYSICIAN ASSISTANT

## 2021-04-28 PROCEDURE — 96375 TX/PRO/DX INJ NEW DRUG ADDON: CPT

## 2021-04-28 PROCEDURE — 96367 TX/PROPH/DG ADDL SEQ IV INF: CPT

## 2021-04-28 PROCEDURE — 83735 ASSAY OF MAGNESIUM: CPT | Performed by: INTERNAL MEDICINE

## 2021-04-28 PROCEDURE — 77332 RADIATION TREATMENT AID(S): CPT | Performed by: RADIOLOGY

## 2021-04-28 PROCEDURE — 99215 OFFICE O/P EST HI 40 MIN: CPT | Performed by: PHYSICIAN ASSISTANT

## 2021-04-28 PROCEDURE — 96413 CHEMO IV INFUSION 1 HR: CPT

## 2021-04-28 PROCEDURE — 77332 RADIATION TREATMENT AID(S): CPT | Mod: 26 | Performed by: RADIOLOGY

## 2021-04-28 PROCEDURE — 84155 ASSAY OF PROTEIN SERUM: CPT | Performed by: PHYSICIAN ASSISTANT

## 2021-04-28 PROCEDURE — 250N000011 HC RX IP 250 OP 636: Performed by: INTERNAL MEDICINE

## 2021-04-28 PROCEDURE — 85025 COMPLETE CBC W/AUTO DIFF WBC: CPT | Performed by: INTERNAL MEDICINE

## 2021-04-28 PROCEDURE — 84075 ASSAY ALKALINE PHOSPHATASE: CPT | Performed by: PHYSICIAN ASSISTANT

## 2021-04-28 PROCEDURE — 84450 TRANSFERASE (AST) (SGOT): CPT | Performed by: PHYSICIAN ASSISTANT

## 2021-04-28 PROCEDURE — 258N000003 HC RX IP 258 OP 636: Performed by: INTERNAL MEDICINE

## 2021-04-28 PROCEDURE — 96415 CHEMO IV INFUSION ADDL HR: CPT

## 2021-04-28 PROCEDURE — 80048 BASIC METABOLIC PNL TOTAL CA: CPT | Performed by: PHYSICIAN ASSISTANT

## 2021-04-28 PROCEDURE — 82040 ASSAY OF SERUM ALBUMIN: CPT | Performed by: PHYSICIAN ASSISTANT

## 2021-04-28 PROCEDURE — 82247 BILIRUBIN TOTAL: CPT | Performed by: PHYSICIAN ASSISTANT

## 2021-04-28 PROCEDURE — 77014 PR CT GUIDE FOR PLACEMENT RADIATION THERAPY FIELDS: CPT | Mod: 26 | Performed by: RADIOLOGY

## 2021-04-28 PROCEDURE — 77386 HC IMRT TREATMENT DELIVERY, COMPLEX: CPT | Performed by: RADIOLOGY

## 2021-04-28 RX ORDER — PALONOSETRON 0.05 MG/ML
0.25 INJECTION, SOLUTION INTRAVENOUS ONCE
Status: COMPLETED | OUTPATIENT
Start: 2021-04-28 | End: 2021-04-28

## 2021-04-28 RX ADMIN — PALONOSETRON 0.25 MG: 0.05 INJECTION, SOLUTION INTRAVENOUS at 08:53

## 2021-04-28 RX ADMIN — DEXAMETHASONE SODIUM PHOSPHATE: 10 INJECTION, SOLUTION INTRAMUSCULAR; INTRAVENOUS at 09:05

## 2021-04-28 RX ADMIN — MAGNESIUM SULFATE HEPTAHYDRATE: 500 INJECTION, SOLUTION INTRAMUSCULAR; INTRAVENOUS at 09:34

## 2021-04-28 RX ADMIN — CISPLATIN 90 MG: 1 INJECTION, SOLUTION INTRAVENOUS at 10:52

## 2021-04-28 ASSESSMENT — PAIN SCALES - GENERAL: PAINLEVEL: NO PAIN (0)

## 2021-04-28 NOTE — PROGRESS NOTES
Infusion Nursing Note:  Quan Murphy presents today for Day 1 Cycle 1 Cisplatin.    Patient seen by provider today: Yes: CARLOS Kiser   present during visit today: Not Applicable.    Note: Quan arrives new to infusion today doing OK. Patient new to oncology infusion room and is receiving Cisplatin for the first time. Pt oriented to infusion room, including chair, nutrition station and call light. New patient teaching done previously by CARLOS Kiser and Dr. Schrader. Writer reinforced chemotherapy teaching/side effects and schedule.     Pt instructed to call care coordinator, triage (or MD on call if after hours/weekends) with chills/temp >=100.5, questions/concerns. Pt stated understanding of plan.     Intravenous Access:  Peripheral IV placed.    Treatment Conditions:  Lab Results   Component Value Date    HGB 14.4 04/28/2021     Lab Results   Component Value Date    WBC 5.5 04/28/2021      Lab Results   Component Value Date    ANEU 3.1 04/28/2021     Lab Results   Component Value Date     04/28/2021      Lab Results   Component Value Date     04/28/2021                   Lab Results   Component Value Date    POTASSIUM 4.8 04/28/2021           Lab Results   Component Value Date    MAG 2.0 04/28/2021            Lab Results   Component Value Date    CR 1.03 04/28/2021                   Lab Results   Component Value Date    LATRELL 8.9 04/28/2021                Lab Results   Component Value Date    BILITOTAL 0.7 04/28/2021           Lab Results   Component Value Date    ALBUMIN 3.5 04/28/2021                    Lab Results   Component Value Date    ALT 86 04/28/2021           Lab Results   Component Value Date    AST Canceled, Test credited 04/28/2021       Results reviewed, labs MET treatment parameters, ok to proceed with treatment.    Post Infusion Assessment:  Patient tolerated infusion without incident.  Blood return noted pre and post infusion.  Site patent and intact, free from  redness, edema or discomfort.  No evidence of extravasations.  Access discontinued per protocol.   Patient voided pre, during and post Cisplatin without difficulty.     Discharge Plan:   Prescription refills given for Zofran and Compazine.  Discharge instructions reviewed with: Patient.  Patient and/or family verbalized understanding of discharge instructions and all questions answered.  Copy of AVS reviewed with patient and/or family.  Patient will return 5/5 for next appointment.  Patient discharged in stable condition accompanied by: self.  Departure Mode: Ambulatory.    Zuleyka Jeronimo RN

## 2021-04-28 NOTE — PATIENT INSTRUCTIONS
Contact Numbers  [facility]: [phone] (for symptom and scheduling needs)    Please call the [facility] Triage line if you experience a temperature greater than or equal to 100.4, shaking chills, have uncontrolled nausea, vomiting and/or diarrhea, dizziness, shortness of breath, chest pain, bleeding, unexplained bruising, or if you have any other new/concerning symptoms, questions or concerns.     If you are having any concerning symptoms or wish to speak to a provider before your next infusion visit, please call your care coordinator or triage to notify them so we can adequately serve you.     If you need a refill on a narcotic prescription or other medication, please call triage before your infusion appointment.

## 2021-04-28 NOTE — PATIENT INSTRUCTIONS
Contact Numbers  [facility]: [phone] (for symptom and scheduling needs)    Please call the [facility] Triage line if you experience a temperature greater than or equal to 100.4, shaking chills, have uncontrolled nausea, vomiting and/or diarrhea, dizziness, shortness of breath, chest pain, bleeding, unexplained bruising, or if you have any other new/concerning symptoms, questions or concerns.     If you are having any concerning symptoms or wish to speak to a provider before your next infusion visit, please call your care coordinator or triage to notify them so we can adequately serve you.     If you need a refill on a narcotic prescription or other medication, please call triage before your infusion appointment.      1. May take Compazine anytime for nausea  2. OK to take Zofran anytime starting Saturday morning

## 2021-04-28 NOTE — NURSING NOTE
Chief Complaint   Patient presents with     Blood Draw     labs drawn with IV start by rn in lab. vital signs taken.     Labs drawn with IV start by RN in lab.  Vital signs taken.  Pt checked in to next appointment.    Jennifer Ramsey RN

## 2021-04-28 NOTE — NURSING NOTE
"Oncology Rooming Note    April 28, 2021 6:58 AM   Quan Murphy is a 70 year old male who presents for:    Chief Complaint   Patient presents with     Blood Draw     labs drawn with IV start by rn in lab. vital signs taken.     Oncology Clinic Visit     Pt is here for a rtn for Squamous Cell Carcinoma of Tonsil     Initial Vitals: Blood Pressure (Abnormal) 143/95 (BP Location: Right arm, Patient Position: Sitting, Cuff Size: Adult Regular)   Pulse 94   Temperature 97.2  F (36.2  C) (Tympanic)   Respiration 16   Weight 102.8 kg (226 lb 9.6 oz)   Oxygen Saturation 96%   Body Mass Index 33.95 kg/m   Estimated body mass index is 33.95 kg/m  as calculated from the following:    Height as of 4/2/21: 1.74 m (5' 8.5\").    Weight as of this encounter: 102.8 kg (226 lb 9.6 oz). Body surface area is 2.23 meters squared.  No Pain (0) Comment: Data Unavailable   No LMP for male patient.  Allergies reviewed: Yes  Medications reviewed: Yes    Medications: Medication refills not needed today.  Pharmacy name entered into [employer]:    [facility] [place], [place] - [address] [place] [address] [place] [address] - [address]    Clinical concerns: none       Ashley Hu MA            "

## 2021-04-28 NOTE — LETTER
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 Christensen). Path: SCC, non keratinizing, p16 +gardenia  3/23/21 CT neck. 2.9 x 2.7 x 3.5 cm L tonsil fullness involving soft palate, level 2-3 neck nodes  4/2/21 PET/CT. 2.7 x 2.2 x 2.7 cm L palatine tonsil mass (SUV 24), extension to oral cavity, 3.4 x 2.0 cm L level 4 node invading SCM, 1.8 x 1.8 cm L level 2A/3 node (SUV 7.9) w/ECS, 1.4 x 0.9 cm R level 2A node (SUV 6.7)    SUBJECTIVE  Patient reports that he feels anxious and tired.  He does feel panicky at times about his cancer diagnosis.  He denies any pain or trouble swallowing.  He reports eating okay.  About a week ago he did vomit once for unclear reasons.  He reports having chronic bladder spasms related to his prior prostate surgery.  He typically is up about every 2 hours at night to urinate.  He has chronic sinus issues for which he is taking Norco.  He is very worried about having sufficient pain control as he goes through his treatment.  He currently is drinking about 2 beers per day.  He denies other concerns.    REVIEW OF SYSTEMS  Patient denies any of the following except if noted above: fevers, chills, difficulty with energy, vision or hearing changes, chest pain, dyspnea, abdominal pain, nausea, diarrhea, constipation, new urinary concerns, headaches, numbness, or tingling.     CURRENT OUTPATIENT MEDICATIONS  Current Outpatient Medications   Medication  Sig Dispense Refill     ALPRAZolam (XANAX) 0.5 MG tablet TAKE 1 TABLET (0.5 MG) BY MOUTH 4 TIMES DAILY AS NEEDED FOR ANXIETY 120 tablet 1     ASPIRIN ADULT LOW STRENGTH 81 MG EC tablet TAKE ONE TABLET BY MOUTH ONCE DAILY 90 tablet 1     citalopram (CELEXA) 40 MG tablet TAKE ONE TABLET BY MOUTH ONCE DAILY 30 tablet 8     HYDROcodone-acetaminophen (NORCO) 7.5-325 MG per tablet Take 1 tablet by mouth every 4 hours as needed for moderate to severe pain maximum 5 tablet(s) per day 150 tablet 0     hydrOXYzine (VISTARIL) 25 MG capsule TAKE 1 CAPSULE (25 MG) BY MOUTH 4 TIMES DAILY AS NEEDED FOR ANXIETY 120 capsule 3     metoprolol succinate ER (TOPROL-XL) 50 MG 24 hr tablet Take 1 tablet (50 mg) by mouth daily 90 tablet 3     nitroGLYcerin (NITROSTAT) 0.4 MG sublingual tablet For chest pain place 1 tablet under the tongue every 5 minutes for 3 doses. If symptoms persist 5 minutes after 1st dose call 911. 25 tablet 0     omeprazole (PRILOSEC) 40 MG DR capsule Take 1 capsule (40 mg) by mouth daily 90 capsule 3     ondansetron (ZOFRAN-ODT) 8 MG ODT tab Take 1 tablet (8 mg) by mouth every 8 hours as needed for nausea 30 tablet 11     oxyCODONE (ROXICODONE) 5 MG tablet Take 1 tablet (5 mg) by mouth every 6 hours as needed for pain 12 tablet 0     prochlorperazine (COMPAZINE) 10 MG tablet Take 0.5 tablets (5 mg) by mouth every 6 hours as needed (Nausea/Vomiting) 30 tablet 11     rosuvastatin (CRESTOR) 40 MG tablet Take 1 tablet (40 mg) by mouth daily 90 tablet 1     testosterone (ANDROGEL 1.62 % PUMP) 20.25 MG/ACT gel testosterone 20.25 mg/1.25 gram (1.62 %) transdermal gel pump       triamcinolone (KENALOG) 0.1 % external cream        zolpidem (AMBIEN) 10 MG tablet TAKE ONE TABLET BY MOUTH EVERY EVENING AS NEEDED FOR SLEEP 30 tablet 5     PHYSICAL EXAM  General: The patient is a pleasant male in no acute distress.  BP (!) 143/95 (BP Location: Right arm, Patient Position: Sitting, Cuff Size: Adult Regular)   Pulse 94   Temp  97.2  F (36.2  C) (Tympanic)   Resp 16   Wt 102.8 kg (226 lb 9.6 oz)   SpO2 96%   BMI 33.95 kg/m    Wt Readings from Last 10 Encounters:   04/28/21 102.8 kg (226 lb 9.6 oz)   04/02/21 102 kg (224 lb 13.9 oz)   03/22/21 101.1 kg (222 lb 12.5 oz)   03/04/21 98.9 kg (218 lb)   10/20/20 99 kg (218 lb 4.8 oz)   07/15/20 99.3 kg (219 lb)   02/18/20 98.1 kg (216 lb 4.8 oz)   02/17/20 98.2 kg (216 lb 9.6 oz)   02/11/20 101.2 kg (223 lb)   11/12/19 100.7 kg (222 lb)   HEENT: EOMI, PERRL. Sclerae are anicteric. Oral mucosa is pink and moist. Left tonsil with ulcerative lesion.   Lymph: Neck is supple with Level 3 node about 2.5 cm in size palpable in the neck that is fixed to the SCM, level 2 node palpable in the neck about 2 cm in size, no other lymphadenopathy in the cervical or supraclavicular areas.   Heart: Regular rate and rhythm.   Lungs: Clear to auscultation bilaterally.   Abdomen: Bowel sounds present, soft, nontender with no palpable hepatosplenomegaly or masses.   Extremities: No lower extremity edema noted bilaterally.   Neuro: Cranial nerves II through XII are grossly intact.  Skin: No rashes, petechiae, or bruising noted on exposed skin.    LABORATORY AND IMAGING STUDIES   4/28/2021 06:43   Sodium 137   Potassium 4.8   Chloride 105   Carbon Dioxide 23   Urea Nitrogen 23   Creatinine 1.03   GFR Estimate 73   GFR Estimate If Black 85   Calcium 8.9   Anion Gap 9   Magnesium 2.0   Albumin 3.5   Protein Total 7.4   Bilirubin Total 0.7   Alkaline Phosphatase 99   ALT 86 (H)   AST Canceled, Test credited   Glucose 101 (H)   WBC 5.5   Hemoglobin 14.4   Hematocrit 43.4   Platelet Count 159   RBC Count 5.21   MCV 83   MCH 27.6   MCHC 33.2   RDW 14.2   Diff Method Automated Method   % Neutrophils 57.0   % Lymphocytes 30.1   % Monocytes 10.6   % Eosinophils 1.5   % Basophils 0.6   % Immature Granulocytes 0.2   Nucleated RBCs 0   Absolute Neutrophil 3.1   Absolute Lymphocytes 1.6   Absolute Monocytes 0.6   Absolute  Eosinophils 0.1   Absolute Basophils 0.0   Abs Immature Granulocytes 0.0   Absolute Nucleated RBC 0.0     ASSESSMENT AND PLAN  SCC L tonsil, [id number], p16 +gardenia, ECS +gardenia: We reviewed the rationale for adding chemotherapy as a radiosensitizer to radiation. 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 We discussed the potential side effects of chemoradiation therapy and their management in detail including nephrotoxicity, nausea, vomiting, anorexia, tinnitus, hearing loss, peripheral neuropathy, myelosuppression, elevated LFT's, mucositis, throat pain, neck pain and erythema with skin breakdown, xerostomia, dysgeusia, and hypothyroidism. Antiemetics, saliva replacement, increased fluid and caloric needs, skin and mouth care were all discussed in detail.  Patient was given a handout on side effect management. Patient was instructed that he will follow up with us in clinic weekly. He was given the triage number to call with any concerns.    Anxiety: AME 7 score today was 10, which falls into the moderate anxiety category. Patient has tried a number of medications in the past. Will refer him to psychiatry to see what might be most helpful for him. He is currently on the maximum dose of Celexa at 40 mg daily. Upon chart review, it appears he has previously been on Abilify, buspirone, escitalopram, sertraline, and hydroxyzine. He was not able to recall this list of medications when I attempted to discuss his mental health history with him.     Anticipated acute on chronic opioid use: Uses hydrocodone-acetaminophen daily at night to help with his sinuses, has had longstanding issues with that and has tried many many interventions without success. We again reviewed that we will assist in managing his cancer treatment associated pain through his treatment. He is also set up to meet with Dr. Serrano on 5/14.     CKD: Discussed risk  of SURESH with cisplatin. Will change to carbo if needed.     Anxiety: Uses alprazolam nightly along with zolpidem, which has worked pretty well. Asked him to let us know if anxiety is getting worse. Would rather avoid benzodiazepines to manage this. Avoid lorazepam.     ASCVD:  See note by Dr. Ch, Cardiology, from 10/20/20. Might need to add metoprolol if his BP is consistently >140/90. Would also decrease lisinopril if creatinine is a problem.     Exercise: He can continue going to the gym as he tolerates.     COVID: Has been vaccinated.    Alcohol use: Recommend minimizing use through his cancer treatment.     Nita Nolasco PA-C  [facility]  [address], [place] [address] [phone]    80 minutes spent on the date of the encounter doing chart review, review of test results, interpretation of tests, patient visit and documentation               Nita Nolasco PA-C

## 2021-04-28 NOTE — LETTER
4/28/2021         RE: Quan Murphy  [address] [facility] [address]        Dear Colleague,    Thank you for referring your patient, Quan Murphy, to the [facility]. Please see a copy of my visit note below.    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 Christensen). Path: SCC, non keratinizing, p16 +gardenia  3/23/21 CT neck. 2.9 x 2.7 x 3.5 cm L tonsil fullness involving soft palate, level 2-3 neck nodes  4/2/21 PET/CT. 2.7 x 2.2 x 2.7 cm L palatine tonsil mass (SUV 24), extension to oral cavity, 3.4 x 2.0 cm L level 4 node invading SCM, 1.8 x 1.8 cm L level 2A/3 node (SUV 7.9) w/ECS, 1.4 x 0.9 cm R level 2A node (SUV 6.7)    SUBJECTIVE  Patient reports that he feels anxious and tired.  He does feel panicky at times about his cancer diagnosis.  He denies any pain or trouble swallowing.  He reports eating okay.  About a week ago he did vomit once for unclear reasons.  He reports having chronic bladder spasms related to his prior prostate surgery.  He typically is up about every 2 hours at night to urinate.  He has chronic sinus issues for which he is taking Norco.  He is very worried about having sufficient pain control as he goes through his treatment.  He currently is drinking about 2 beers per day.  He denies other concerns.    REVIEW OF SYSTEMS  Patient denies any of the following except if noted above: fevers, chills, difficulty with energy, vision or hearing changes, chest pain, dyspnea, abdominal pain,  nausea, diarrhea, constipation, new urinary concerns, headaches, numbness, or tingling.     CURRENT OUTPATIENT MEDICATIONS  Current Outpatient Medications   Medication Sig Dispense Refill     ALPRAZolam (XANAX) 0.5 MG tablet TAKE 1 TABLET (0.5 MG) BY MOUTH 4 TIMES DAILY AS NEEDED FOR ANXIETY 120 tablet 1     ASPIRIN ADULT LOW STRENGTH 81 MG EC tablet TAKE ONE TABLET BY MOUTH ONCE DAILY 90 tablet 1     citalopram (CELEXA) 40 MG tablet TAKE ONE TABLET BY MOUTH ONCE DAILY 30 tablet 8     HYDROcodone-acetaminophen (NORCO) 7.5-325 MG per tablet Take 1 tablet by mouth every 4 hours as needed for moderate to severe pain maximum 5 tablet(s) per day 150 tablet 0     hydrOXYzine (VISTARIL) 25 MG capsule TAKE 1 CAPSULE (25 MG) BY MOUTH 4 TIMES DAILY AS NEEDED FOR ANXIETY 120 capsule 3     metoprolol succinate ER (TOPROL-XL) 50 MG 24 hr tablet Take 1 tablet (50 mg) by mouth daily 90 tablet 3     nitroGLYcerin (NITROSTAT) 0.4 MG sublingual tablet For chest pain place 1 tablet under the tongue every 5 minutes for 3 doses. If symptoms persist 5 minutes after 1st dose call 911. 25 tablet 0     omeprazole (PRILOSEC) 40 MG DR capsule Take 1 capsule (40 mg) by mouth daily 90 capsule 3     ondansetron (ZOFRAN-ODT) 8 MG ODT tab Take 1 tablet (8 mg) by mouth every 8 hours as needed for nausea 30 tablet 11     oxyCODONE (ROXICODONE) 5 MG tablet Take 1 tablet (5 mg) by mouth every 6 hours as needed for pain 12 tablet 0     prochlorperazine (COMPAZINE) 10 MG tablet Take 0.5 tablets (5 mg) by mouth every 6 hours as needed (Nausea/Vomiting) 30 tablet 11     rosuvastatin (CRESTOR) 40 MG tablet Take 1 tablet (40 mg) by mouth daily 90 tablet 1     testosterone (ANDROGEL 1.62 % PUMP) 20.25 MG/ACT gel testosterone 20.25 mg/1.25 gram (1.62 %) transdermal gel pump       triamcinolone (KENALOG) 0.1 % external cream        zolpidem (AMBIEN) 10 MG tablet TAKE ONE TABLET BY MOUTH EVERY EVENING AS NEEDED FOR SLEEP 30 tablet 5     PHYSICAL EXAM  General:  The patient is a pleasant male in no acute distress.  BP (!) 143/95 (BP Location: Right arm, Patient Position: Sitting, Cuff Size: Adult Regular)   Pulse 94   Temp 97.2  F (36.2  C) (Tympanic)   Resp 16   Wt 102.8 kg (226 lb 9.6 oz)   SpO2 96%   BMI 33.95 kg/m    Wt Readings from Last 10 Encounters:   04/28/21 102.8 kg (226 lb 9.6 oz)   04/02/21 102 kg (224 lb 13.9 oz)   03/22/21 101.1 kg (222 lb 12.5 oz)   03/04/21 98.9 kg (218 lb)   10/20/20 99 kg (218 lb 4.8 oz)   07/15/20 99.3 kg (219 lb)   02/18/20 98.1 kg (216 lb 4.8 oz)   02/17/20 98.2 kg (216 lb 9.6 oz)   02/11/20 101.2 kg (223 lb)   11/12/19 100.7 kg (222 lb)   HEENT: EOMI, PERRL. Sclerae are anicteric. Oral mucosa is pink and moist. Left tonsil with ulcerative lesion.   Lymph: Neck is supple with Level 3 node about 2.5 cm in size palpable in the neck that is fixed to the SCM, level 2 node palpable in the neck about 2 cm in size, no other lymphadenopathy in the cervical or supraclavicular areas.   Heart: Regular rate and rhythm.   Lungs: Clear to auscultation bilaterally.   Abdomen: Bowel sounds present, soft, nontender with no palpable hepatosplenomegaly or masses.   Extremities: No lower extremity edema noted bilaterally.   Neuro: Cranial nerves II through XII are grossly intact.  Skin: No rashes, petechiae, or bruising noted on exposed skin.    LABORATORY AND IMAGING STUDIES   4/28/2021 06:43   Sodium 137   Potassium 4.8   Chloride 105   Carbon Dioxide 23   Urea Nitrogen 23   Creatinine 1.03   GFR Estimate 73   GFR Estimate If Black 85   Calcium 8.9   Anion Gap 9   Magnesium 2.0   Albumin 3.5   Protein Total 7.4   Bilirubin Total 0.7   Alkaline Phosphatase 99   ALT 86 (H)   AST Canceled, Test credited   Glucose 101 (H)   WBC 5.5   Hemoglobin 14.4   Hematocrit 43.4   Platelet Count 159   RBC Count 5.21   MCV 83   MCH 27.6   MCHC 33.2   RDW 14.2   Diff Method Automated Method   % Neutrophils 57.0   % Lymphocytes 30.1   % Monocytes 10.6   %  Eosinophils 1.5   % Basophils 0.6   % Immature Granulocytes 0.2   Nucleated RBCs 0   Absolute Neutrophil 3.1   Absolute Lymphocytes 1.6   Absolute Monocytes 0.6   Absolute Eosinophils 0.1   Absolute Basophils 0.0   Abs Immature Granulocytes 0.0   Absolute Nucleated RBC 0.0     ASSESSMENT AND PLAN  SCC L tonsil, [id number], p16 +gardenia, ECS +gardenia: We reviewed the rationale for adding chemotherapy as a radiosensitizer to radiation. 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 We discussed the potential side effects of chemoradiation therapy and their management in detail including nephrotoxicity, nausea, vomiting, anorexia, tinnitus, hearing loss, peripheral neuropathy, myelosuppression, elevated LFT's, mucositis, throat pain, neck pain and erythema with skin breakdown, xerostomia, dysgeusia, and hypothyroidism. Antiemetics, saliva replacement, increased fluid and caloric needs, skin and mouth care were all discussed in detail.  Patient was given a handout on side effect management. Patient was instructed that he will follow up with us in clinic weekly. He was given the triage number to call with any concerns.    Anxiety: AME 7 score today was 10, which falls into the moderate anxiety category. Patient has tried a number of medications in the past. Will refer him to psychiatry to see what might be most helpful for him. He is currently on the maximum dose of Celexa at 40 mg daily. Upon chart review, it appears he has previously been on Abilify, buspirone, escitalopram, sertraline, and hydroxyzine. He was not able to recall this list of medications when I attempted to discuss his mental health history with him.     Anticipated acute on chronic opioid use: Uses hydrocodone-acetaminophen daily at night to help with his sinuses, has had longstanding issues with that and has tried many many interventions without success. We again  reviewed that we will assist in managing his cancer treatment associated pain through his treatment. He is also set up to meet with Dr. Serrano on 5/14.     CKD: Discussed risk of SURESH with cisplatin. Will change to carbo if needed.     Anxiety: Uses alprazolam nightly along with zolpidem, which has worked pretty well. Asked him to let us know if anxiety is getting worse. Would rather avoid benzodiazepines to manage this. Avoid lorazepam.     ASCVD:  See note by Dr. Ch, Cardiology, from 10/20/20. Might need to add metoprolol if his BP is consistently >140/90. Would also decrease lisinopril if creatinine is a problem.     Exercise: He can continue going to the gym as he tolerates.     COVID: Has been vaccinated.    Alcohol use: Recommend minimizing use through his cancer treatment.     Nita Nolasco PA-C  [facility]  [address] [place] [address]  [phone]    80 minutes spent on the date of the encounter doing chart review, review of test results, interpretation of tests, patient visit and documentation             Again, thank you for allowing me to participate in the care of your patient.        Sincerely,        Nita Nolasco PA-C

## 2021-04-28 NOTE — PROGRESS NOTES
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 Christensen). Path: SCC, non keratinizing, p16 +gardenia  3/23/21 CT neck. 2.9 x 2.7 x 3.5 cm L tonsil fullness involving soft palate, level 2-3 neck nodes  4/2/21 PET/CT. 2.7 x 2.2 x 2.7 cm L palatine tonsil mass (SUV 24), extension to oral cavity, 3.4 x 2.0 cm L level 4 node invading SCM, 1.8 x 1.8 cm L level 2A/3 node (SUV 7.9) w/ECS, 1.4 x 0.9 cm R level 2A node (SUV 6.7)    SUBJECTIVE  Patient reports that he feels anxious and tired.  He does feel panicky at times about his cancer diagnosis.  He denies any pain or trouble swallowing.  He reports eating okay.  About a week ago he did vomit once for unclear reasons.  He reports having chronic bladder spasms related to his prior prostate surgery.  He typically is up about every 2 hours at night to urinate.  He has chronic sinus issues for which he is taking Norco.  He is very worried about having sufficient pain control as he goes through his treatment.  He currently is drinking about 2 beers per day.  He denies other concerns.    REVIEW OF SYSTEMS  Patient denies any of the following except if noted above: fevers, chills, difficulty with energy, vision or hearing changes, chest pain, dyspnea, abdominal pain, nausea, diarrhea, constipation, new urinary concerns, headaches, numbness, or tingling.     CURRENT OUTPATIENT MEDICATIONS  Current Outpatient Medications   Medication Sig Dispense Refill     ALPRAZolam (XANAX) 0.5 MG tablet TAKE 1 TABLET (0.5 MG) BY  MOUTH 4 TIMES DAILY AS NEEDED FOR ANXIETY 120 tablet 1     ASPIRIN ADULT LOW STRENGTH 81 MG EC tablet TAKE ONE TABLET BY MOUTH ONCE DAILY 90 tablet 1     citalopram (CELEXA) 40 MG tablet TAKE ONE TABLET BY MOUTH ONCE DAILY 30 tablet 8     HYDROcodone-acetaminophen (NORCO) 7.5-325 MG per tablet Take 1 tablet by mouth every 4 hours as needed for moderate to severe pain maximum 5 tablet(s) per day 150 tablet 0     hydrOXYzine (VISTARIL) 25 MG capsule TAKE 1 CAPSULE (25 MG) BY MOUTH 4 TIMES DAILY AS NEEDED FOR ANXIETY 120 capsule 3     metoprolol succinate ER (TOPROL-XL) 50 MG 24 hr tablet Take 1 tablet (50 mg) by mouth daily 90 tablet 3     nitroGLYcerin (NITROSTAT) 0.4 MG sublingual tablet For chest pain place 1 tablet under the tongue every 5 minutes for 3 doses. If symptoms persist 5 minutes after 1st dose call 911. 25 tablet 0     omeprazole (PRILOSEC) 40 MG DR capsule Take 1 capsule (40 mg) by mouth daily 90 capsule 3     ondansetron (ZOFRAN-ODT) 8 MG ODT tab Take 1 tablet (8 mg) by mouth every 8 hours as needed for nausea 30 tablet 11     oxyCODONE (ROXICODONE) 5 MG tablet Take 1 tablet (5 mg) by mouth every 6 hours as needed for pain 12 tablet 0     prochlorperazine (COMPAZINE) 10 MG tablet Take 0.5 tablets (5 mg) by mouth every 6 hours as needed (Nausea/Vomiting) 30 tablet 11     rosuvastatin (CRESTOR) 40 MG tablet Take 1 tablet (40 mg) by mouth daily 90 tablet 1     testosterone (ANDROGEL 1.62 % PUMP) 20.25 MG/ACT gel testosterone 20.25 mg/1.25 gram (1.62 %) transdermal gel pump       triamcinolone (KENALOG) 0.1 % external cream        zolpidem (AMBIEN) 10 MG tablet TAKE ONE TABLET BY MOUTH EVERY EVENING AS NEEDED FOR SLEEP 30 tablet 5     PHYSICAL EXAM  General: The patient is a pleasant male in no acute distress.  BP (!) 143/95 (BP Location: Right arm, Patient Position: Sitting, Cuff Size: Adult Regular)   Pulse 94   Temp 97.2  F (36.2  C) (Tympanic)   Resp 16   Wt 102.8 kg (226 lb 9.6 oz)   SpO2 96%    BMI 33.95 kg/m    Wt Readings from Last 10 Encounters:   04/28/21 102.8 kg (226 lb 9.6 oz)   04/02/21 102 kg (224 lb 13.9 oz)   03/22/21 101.1 kg (222 lb 12.5 oz)   03/04/21 98.9 kg (218 lb)   10/20/20 99 kg (218 lb 4.8 oz)   07/15/20 99.3 kg (219 lb)   02/18/20 98.1 kg (216 lb 4.8 oz)   02/17/20 98.2 kg (216 lb 9.6 oz)   02/11/20 101.2 kg (223 lb)   11/12/19 100.7 kg (222 lb)   HEENT: EOMI, PERRL. Sclerae are anicteric. Oral mucosa is pink and moist. Left tonsil with ulcerative lesion.   Lymph: Neck is supple with Level 3 node about 2.5 cm in size palpable in the neck that is fixed to the SCM, level 2 node palpable in the neck about 2 cm in size, no other lymphadenopathy in the cervical or supraclavicular areas.   Heart: Regular rate and rhythm.   Lungs: Clear to auscultation bilaterally.   Abdomen: Bowel sounds present, soft, nontender with no palpable hepatosplenomegaly or masses.   Extremities: No lower extremity edema noted bilaterally.   Neuro: Cranial nerves II through XII are grossly intact.  Skin: No rashes, petechiae, or bruising noted on exposed skin.    LABORATORY AND IMAGING STUDIES   4/28/2021 06:43   Sodium 137   Potassium 4.8   Chloride 105   Carbon Dioxide 23   Urea Nitrogen 23   Creatinine 1.03   GFR Estimate 73   GFR Estimate If Black 85   Calcium 8.9   Anion Gap 9   Magnesium 2.0   Albumin 3.5   Protein Total 7.4   Bilirubin Total 0.7   Alkaline Phosphatase 99   ALT 86 (H)   AST Canceled, Test credited   Glucose 101 (H)   WBC 5.5   Hemoglobin 14.4   Hematocrit 43.4   Platelet Count 159   RBC Count 5.21   MCV 83   MCH 27.6   MCHC 33.2   RDW 14.2   Diff Method Automated Method   % Neutrophils 57.0   % Lymphocytes 30.1   % Monocytes 10.6   % Eosinophils 1.5   % Basophils 0.6   % Immature Granulocytes 0.2   Nucleated RBCs 0   Absolute Neutrophil 3.1   Absolute Lymphocytes 1.6   Absolute Monocytes 0.6   Absolute Eosinophils 0.1   Absolute Basophils 0.0   Abs Immature Granulocytes 0.0   Absolute  Nucleated RBC 0.0     ASSESSMENT AND PLAN  SCC L tonsil, qH0M2V0, p16 +gardenia, ECS +gardenia: We reviewed the rationale for adding chemotherapy as a radiosensitizer to radiation. Because of CKD with intermittent USRESH, weekly cisplatin was recommended instead of HD cisplatin for about 6 doses concurrently with radiation. We discussed the potential side effects of chemoradiation therapy and their management in detail including nephrotoxicity, nausea, vomiting, anorexia, tinnitus, hearing loss, peripheral neuropathy, myelosuppression, elevated LFT's, mucositis, throat pain, neck pain and erythema with skin breakdown, xerostomia, dysgeusia, and hypothyroidism. Antiemetics, saliva replacement, increased fluid and caloric needs, skin and mouth care were all discussed in detail.  Patient was given a handout on side effect management. Patient was instructed that he will follow up with us in clinic weekly. He was given the triage number to call with any concerns.    Anxiety: AME 7 score today was 10, which falls into the moderate anxiety category. Patient has tried a number of medications in the past. Will refer him to psychiatry to see what might be most helpful for him. He is currently on the maximum dose of Celexa at 40 mg daily. Upon chart review, it appears he has previously been on Abilify, buspirone, escitalopram, sertraline, and hydroxyzine. He was not able to recall this list of medications when I attempted to discuss his mental health history with him.     Anticipated acute on chronic opioid use: Uses hydrocodone-acetaminophen daily at night to help with his sinuses, has had longstanding issues with that and has tried many many interventions without success. We again reviewed that we will assist in managing his cancer treatment associated pain through his treatment. He is also set up to meet with Dr. Serrano on 5/14.     CKD: Discussed risk of SURESH with cisplatin. Will change to carbo if needed.     Anxiety: Uses alprazolam  nightly along with zolpidem, which has worked pretty well. Asked him to let us know if anxiety is getting worse. Would rather avoid benzodiazepines to manage this. Avoid lorazepam.     ASCVD:  See note by Dr. Ch, Cardiology, from 10/20/20. Might need to add metoprolol if his BP is consistently >140/90. Would also decrease lisinopril if creatinine is a problem.     Exercise: He can continue going to the gym as he tolerates.     COVID: Has been vaccinated.    Alcohol use: Recommend minimizing use through his cancer treatment.     Nita Nolasco PA-C  [facility]  [address], [place] [address] [phone]    80 minutes spent on the date of the encounter doing chart review, review of test results, interpretation of tests, patient visit and documentation

## 2021-04-28 NOTE — PATIENT INSTRUCTIONS
Contact Numbers  [facility]: [phone] (for symptom and scheduling needs)    Please call the [facility] Triage line if you experience a temperature greater than or equal to 100.4, shaking chills, have uncontrolled nausea, vomiting and/or diarrhea, dizziness, shortness of breath, chest pain, bleeding, unexplained bruising, or if you have any other new/concerning symptoms, questions or concerns.     If you are having any concerning symptoms or wish to speak to a provider before your next infusion visit, please call your care coordinator or triage to notify them so we can adequately serve you.     If you need a refill on a narcotic prescription or other medication, please call triage before your infusion appointment.          Cisplatin side effect management  Decreased kidney function- Drink at least 64oz of fluids daily (water, gatorade, powerade, milk, juice, tea)  Nausea/Vomiting- Through IV  Aloxi,  Decadron, and Emend  Zofran (ondansetron) 8 mg every 8 hours as needed, starting 72 hours after chemotherapy  Compazine (prochlorperazine) 5 mg every 6 hours as needed any day  Decreased appetite- Use nutritional supplements like Ensure, Boost, Dugspur Instant Breakfast, or Scandi shakes with total calorie intake goal of 9993-2623/day, push through tube if unable to swallow  Ringing in ears or hearing loss- monitor and let us know in clinic if ringing or hearing loss lasts more than 1 week  Numbness/Tingling in hands/feet- monitor and let us know in clinic if persists or painful  Decreased blood counts and elevated liver tests- monitor in clinic with weekly lab checks. Need to call or go to ED with any fever of 100.4 or greater     Radiation side effect management  Mouth sores- Start 1 tsp salt/ 1 tsp baking soda in 1 cup of water swishes at least 4 times per day to prevent and minimize sore, may be prescribed Magic Mouthwash to numb mouth once sores occur  Throat pain- May take Tylenol 1000 mg every 8 hours, if  insufficient, contact us in clinic for stronger pain medications  Neck pain and redness- Apply lotions to neck as recommended and prescribed by radiation oncology, including Aquaphor  Increased throat secretions and dry mouth- Carry a water bottle and take sips frequently, may use Biotene products for saliva replacement, May use Mucinex (guaifenesin) for thick throat secretions  Loss of taste- Continue to eat frequent small meals, eat foods that do still taste good, use nutritional supplements, as above  Thrush with white tongue- contact clinic for prescription Nystatin  Nausea-see above  Hypothyroidism- Will watch for symptoms in clinic    You will be seen in clinic on a weekly basis to manage symptoms

## 2021-04-29 ENCOUNTER — OFFICE VISIT (OUTPATIENT)
Dept: RADIATION ONCOLOGY | Facility: CLINIC | Age: 71
End: 2021-04-29
Attending: RADIOLOGY
Payer: MEDICARE

## 2021-04-29 ENCOUNTER — PATIENT OUTREACH (OUTPATIENT)
Dept: ONCOLOGY | Facility: CLINIC | Age: 71
End: 2021-04-29

## 2021-04-29 VITALS
DIASTOLIC BLOOD PRESSURE: 91 MMHG | SYSTOLIC BLOOD PRESSURE: 172 MMHG | BODY MASS INDEX: 34.91 KG/M2 | HEART RATE: 76 BPM | WEIGHT: 233 LBS

## 2021-04-29 DIAGNOSIS — C09.9 SQUAMOUS CELL CARCINOMA OF LEFT TONSIL (H): Primary | ICD-10-CM

## 2021-04-29 PROCEDURE — 77386 HC IMRT TREATMENT DELIVERY, COMPLEX: CPT | Performed by: RADIOLOGY

## 2021-04-29 NOTE — PROGRESS NOTES
RADIATION ONCOLOGY WEEKLY ON TREATMENT VISIT   Encounter Date: 2021    Patient Name: Quan Murphy  MRN: [id number]  : 1950     Disease and Stage: cT2 N2 M0 p16 positive squamous cell carcinoma of the left tonsil  Treatment Site: Oropharynx and bilateral neck  Current Dose/Planned Total Dose: 424 / 6996 cGy  Daily Fraction Size: 212 cGy/day, 5 times/week  Concurrent Chemotherapy: Yes  Drug and Frequency: Cisplatin (40 mg/m ) weekly    Treatment Summary:    2021: Initiation of radiotherapy. Cycle 1, day 1 of weekly cisplatin.    2021: Weekly RT visit. Current dose of 424 cGy. Tolerating treatment well. No issues.    ED visits/Hospitalizations:  None    Missed Treatments:  None    Subjective: Mr. Murphy presents to clinic today for his weekly on-treatment visit. He has tolerated his first 2 fractions of head and neck radiotherapy very well and has no concerns or complaints. He reports that he is compliant with his salt/soda rinses, SLP exercises and nutrition.     ROS:   Constitutional  Pain (0-10): 0  Fatigue: None    CNS  Headaches: None    ENT  Mucositis: None    Cardiopulmonary  Dyspnea: None    GI  Nausea/vomiting: None    Nutrition  PEG: No  Diet: Regular    Integumentary  Dermatitis: None    Objective:   Current weight: 105.7 kg    BP: 172/91 (sitting), 172/90 (standing)  Pulse: 76 (sitting), 83 (standing)    General: Healthy-appearing 70-year-old gentleman seated comfortably in a chair in no acute distress  HEENT: NC/AT.  EOMI.  No rhinorrhea or epistaxis.  Moist mucous membranes.  Exophytic mass involving the left tonsillar fossa extending onto the left soft palate.  No oral thrush.  Pulmonary: No wheezing, stridor or respiratory distress  Skin: No erythema    Treatment-related toxicities (CTCAE v5.0):  None    Assessment:    Mr. 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 He is receiving curative-intent concurrent  chemoradiation with weekly cisplatin.  He has tolerated the initiation of treatment well with no significant acute radiation-induced toxicities.    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:    Continue chemoradiotherapy    Pain management:    No symptoms requiring medical management    Fluids/Electrolytes/Nutrition:    Continue diet as tolerated with caloric goals as recommended by the oncology dietitian    Dermatitis:    Continue twice daily Aquaphor use to the lower face bilateral neck    Mucositis:    Continue frequent salt/soda rinses    Mosaiq chart and setup information reviewed  IGRT images reviewed    Medication list reviewed    Ahmet Robbins MD/PhD    Dept of Radiation Oncology  [facility]

## 2021-04-29 NOTE — PROGRESS NOTES
Talked to patient at length today.  Chemoteach done.  Materials sent via [employer].    1. Introduction-roles of MD, RIVERA/NP, RNCC reviewed in detail.  2. Contact information given.  3. Education done.  4. Infection prevention, neutropenia and when to call RNCC reviewed in detail.. Port and peg information given.  5. Schedule reviewed.    Patient is highly anxious regarding pain and we discussed this at length.  Reassurance given numerous times that we do not want our patients to be in pain and that it is treated appropriately.  Patient is very concerned that he will have pain before his 5/24 palliative care visit.  Process reviewed for how we treat pain prior to palliative appt.  Patient knows to call me directly and that I will discuss with provider.  Patient has also decided not to pursue psychiatry appt. At this time and he has canceled that.      Many questions asked and answered.  My direct dial phone number given to patient.  He was very happy to receive this.    Mirna Ramires MSN, RN, OCN  RN Care Coordinator  [facility]  [phone]

## 2021-04-30 ENCOUNTER — APPOINTMENT (OUTPATIENT)
Dept: RADIATION ONCOLOGY | Facility: CLINIC | Age: 71
End: 2021-04-30
Attending: RADIOLOGY
Payer: MEDICARE

## 2021-04-30 PROCEDURE — 77386 HC IMRT TREATMENT DELIVERY, COMPLEX: CPT | Performed by: RADIOLOGY

## 2021-04-30 PROCEDURE — 77014 PR CT GUIDE FOR PLACEMENT RADIATION THERAPY FIELDS: CPT | Mod: 26 | Performed by: RADIOLOGY

## 2021-05-03 ENCOUNTER — APPOINTMENT (OUTPATIENT)
Dept: RADIATION ONCOLOGY | Facility: CLINIC | Age: 71
End: 2021-05-03
Attending: RADIOLOGY
Payer: MEDICARE

## 2021-05-03 PROCEDURE — 77014 PR CT GUIDE FOR PLACEMENT RADIATION THERAPY FIELDS: CPT | Mod: 26 | Performed by: RADIOLOGY

## 2021-05-03 PROCEDURE — 77386 HC IMRT TREATMENT DELIVERY, COMPLEX: CPT | Performed by: RADIOLOGY

## 2021-05-04 ENCOUNTER — APPOINTMENT (OUTPATIENT)
Dept: RADIATION ONCOLOGY | Facility: CLINIC | Age: 71
End: 2021-05-04
Attending: RADIOLOGY
Payer: MEDICARE

## 2021-05-04 PROCEDURE — 77427 RADIATION TX MANAGEMENT X5: CPT | Performed by: RADIOLOGY

## 2021-05-04 PROCEDURE — 77386 HC IMRT TREATMENT DELIVERY, COMPLEX: CPT | Performed by: RADIOLOGY

## 2021-05-04 PROCEDURE — 77014 PR CT GUIDE FOR PLACEMENT RADIATION THERAPY FIELDS: CPT | Mod: 26 | Performed by: RADIOLOGY

## 2021-05-04 PROCEDURE — 77336 RADIATION PHYSICS CONSULT: CPT | Performed by: RADIOLOGY

## 2021-05-04 NOTE — PROGRESS NOTES
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 Christensen). Path: SCC, non keratinizing, p16 +gardenia  3/23/21 CT neck. 2.9 x 2.7 x 3.5 cm L tonsil fullness involving soft palate, level 2-3 neck nodes  4/2/21 PET/CT. 2.7 x 2.2 x 2.7 cm L palatine tonsil mass (SUV 24), extension to oral cavity, 3.4 x 2.0 cm L level 4 node invading SCM, 1.8 x 1.8 cm L level 2A/3 node (SUV 7.9) w/ECS, 1.4 x 0.9 cm R level 2A node (SUV 6.7)    Concurrent chemoradiation with weekly cisplatin began 4/28/21    SUBJECTIVE  New is feeling okay today. He notes some intermittent nausea so is taking compazine 3x per day along with zofran as needed. His bowels are moving, he takes stool softeners as needed.     Perhaps has low buzz in his ears but did not really notice until I asked about it. As far as neuropathy, he notes soft tingling in his fingertips. He has still been able to play his guitar which he enjoys.     Throat is uncomfortable when he does swallowing exercises but overall pain is controlled with prn norco q4 hours, he takes 5 total throughout the day . He has no limitation of eating right now but they have purchased high protein powder in anticipation. Feels he is staying well hydrated, drinking at least 8 glasses of water per day. No dizziness or urinary concerns.     He stopped his lisinopril and increased his metoprolol but cannot remember to what dose.    He denies other concerns.    REVIEW OF SYSTEMS  Patient denies any of the following except if noted above: fevers, chills,  difficulty with energy, vision or hearing changes, chest pain, dyspnea, abdominal pain, nausea, diarrhea, constipation, new urinary concerns, headaches, numbness, or tingling.     CURRENT OUTPATIENT MEDICATIONS  Current Outpatient Medications   Medication Sig Dispense Refill     ALPRAZolam (XANAX) 0.5 MG tablet TAKE 1 TABLET (0.5 MG) BY MOUTH 4 TIMES DAILY AS NEEDED FOR ANXIETY 120 tablet 1     ASPIRIN ADULT LOW STRENGTH 81 MG EC tablet TAKE ONE TABLET BY MOUTH ONCE DAILY 90 tablet 1     citalopram (CELEXA) 40 MG tablet TAKE ONE TABLET BY MOUTH ONCE DAILY 30 tablet 8     HYDROcodone-acetaminophen (NORCO) 7.5-325 MG per tablet Take 1 tablet by mouth every 4 hours as needed for moderate to severe pain maximum 5 tablet(s) per day 150 tablet 0     hydrOXYzine (VISTARIL) 25 MG capsule TAKE 1 CAPSULE (25 MG) BY MOUTH 4 TIMES DAILY AS NEEDED FOR ANXIETY 120 capsule 3     metoprolol succinate ER (TOPROL-XL) 50 MG 24 hr tablet Take 1 tablet (50 mg) by mouth daily 90 tablet 3     nitroGLYcerin (NITROSTAT) 0.4 MG sublingual tablet For chest pain place 1 tablet under the tongue every 5 minutes for 3 doses. If symptoms persist 5 minutes after 1st dose call 911. 25 tablet 0     omeprazole (PRILOSEC) 40 MG DR capsule Take 1 capsule (40 mg) by mouth daily 90 capsule 3     ondansetron (ZOFRAN-ODT) 8 MG ODT tab Take 1 tablet (8 mg) by mouth every 8 hours as needed for nausea 30 tablet 11     oxyCODONE (ROXICODONE) 5 MG tablet Take 1 tablet (5 mg) by mouth every 6 hours as needed for pain 12 tablet 0     prochlorperazine (COMPAZINE) 10 MG tablet Take 0.5 tablets (5 mg) by mouth every 6 hours as needed (Nausea/Vomiting) 30 tablet 11     rosuvastatin (CRESTOR) 40 MG tablet Take 1 tablet (40 mg) by mouth daily 90 tablet 1     testosterone (ANDROGEL 1.62 % PUMP) 20.25 MG/ACT gel testosterone 20.25 mg/1.25 gram (1.62 %) transdermal gel pump       triamcinolone (KENALOG) 0.1 % external cream        zolpidem (AMBIEN) 10 MG tablet TAKE ONE  TABLET BY MOUTH EVERY EVENING AS NEEDED FOR SLEEP 30 tablet 5     PHYSICAL EXAM  General: The patient is a pleasant male in no acute distress.  There were no vitals taken for this visit.  Wt Readings from Last 10 Encounters:   04/29/21 105.7 kg (233 lb)   04/28/21 102.8 kg (226 lb 9.6 oz)   04/02/21 102 kg (224 lb 13.9 oz)   03/22/21 101.1 kg (222 lb 12.5 oz)   03/04/21 98.9 kg (218 lb)   10/20/20 99 kg (218 lb 4.8 oz)   07/15/20 99.3 kg (219 lb)   02/18/20 98.1 kg (216 lb 4.8 oz)   02/17/20 98.2 kg (216 lb 9.6 oz)   02/11/20 101.2 kg (223 lb)   HEENT: EOMI, PERRL. Sclerae are anicteric. Oral mucosa is pink and moist. Left tonsil with ulcerative lesion.   Lymph: Neck is supple with Level 3 left node about 2 cm in size palpable in the neck. level 2 node palpable in the neck about 2 cm in size, no other lymphadenopathy in the cervical or supraclavicular areas.   Heart: Regular rate and rhythm.   Lungs: Clear to auscultation bilaterally.   Abdomen: Bowel sounds present, soft, nontender with no palpable hepatosplenomegaly or masses.   Extremities: No lower extremity edema noted bilaterally.   Neuro: Cranial nerves II through XII are grossly intact.  Skin: No rashes, petechiae, or bruising noted on exposed skin.    LABORATORY AND IMAGING STUDIES     Ref. Range 5/5/2021 08:00   Sodium Latest Ref Range: 133 - 144 mmol/L 137   Potassium Latest Ref Range: 3.4 - 5.3 mmol/L 4.4   Chloride Latest Ref Range: 94 - 109 mmol/L 106   Carbon Dioxide Latest Ref Range: 20 - 32 mmol/L 25   Urea Nitrogen Latest Ref Range: 7 - 30 mg/dL 23   Creatinine Latest Ref Range: 0.66 - 1.25 mg/dL 1.35 (H)   GFR Estimate Latest Ref Range: >60 mL/min/1.73_m2 53 (L)   GFR Estimate If Black Latest Ref Range: >60 mL/min/1.73_m2 61   Calcium Latest Ref Range: 8.5 - 10.1 mg/dL 9.2   Anion Gap Latest Ref Range: 3 - 14 mmol/L 6   Magnesium Latest Ref Range: 1.6 - 2.3 mg/dL 2.2   Albumin Latest Ref Range: 3.4 - 5.0 g/dL 3.5   Protein Total Latest Ref Range:  6.8 - 8.8 g/dL 7.2   Bilirubin Total Latest Ref Range: 0.2 - 1.3 mg/dL 0.5   Alkaline Phosphatase Latest Ref Range: 40 - 150 U/L 91   ALT Latest Ref Range: 0 - 70 U/L 55   AST Latest Ref Range: 0 - 45 U/L 38   Glucose Latest Ref Range: 70 - 99 mg/dL 101 (H)   WBC Latest Ref Range: 4.0 - 11.0 10e9/L 7.4   Hemoglobin Latest Ref Range: 13.3 - 17.7 g/dL 14.0   Hematocrit Latest Ref Range: 40.0 - 53.0 % 42.5   Platelet Count Latest Ref Range: 150 - 450 10e9/L 194   RBC Count Latest Ref Range: 4.4 - 5.9 10e12/L 5.03   MCV Latest Ref Range: 78 - 100 fl 85   MCH Latest Ref Range: 26.5 - 33.0 pg 27.8   MCHC Latest Ref Range: 31.5 - 36.5 g/dL 32.9   RDW Latest Ref Range: 10.0 - 15.0 % 14.0   Diff Method Unknown Automated Method   % Neutrophils Latest Units: % 72.8   % Lymphocytes Latest Units: % 12.7   % Monocytes Latest Units: % 12.2   % Eosinophils Latest Units: % 1.9   % Basophils Latest Units: % 0.1   % Immature Granulocytes Latest Units: % 0.3   Nucleated RBCs Latest Ref Range: 0 /100 0   Absolute Neutrophil Latest Ref Range: 1.6 - 8.3 10e9/L 5.4   Absolute Lymphocytes Latest Ref Range: 0.8 - 5.3 10e9/L 0.9   Absolute Monocytes Latest Ref Range: 0.0 - 1.3 10e9/L 0.9   Absolute Eosinophils Latest Ref Range: 0.0 - 0.7 10e9/L 0.1   Absolute Basophils Latest Ref Range: 0.0 - 0.2 10e9/L 0.0   Abs Immature Granulocytes Latest Ref Range: 0 - 0.4 10e9/L 0.0   Absolute Nucleated RBC Unknown 0.0       ASSESSMENT AND PLAN  SCC L tonsil, sP2M1K0, p16 +gardenia, ECS +gardenia: began chemotherapy 4/28/21 as a radiosensitizer to radiation with weekly cisplatin, recommended because of CKD with intermittent SURESH. Plan for about 6 doses concurrently with radiation. He tolerated first week okay ex some nausea and kidney injury. Cr Cl within limits to proceed with same dose. Will update Dr. Schrader and see what her threshold is to switch to carbo.   --follow up weekly    SURESH on CKD; baseline looks to be around 1.1: 1.35 today. BUN wnl and he feels  well hydrated so likely mostly 2/2 cisplatin. Getting about 1.5 L total fluid today with treatment and he will continue to push PO liquid ~80 oz daily.   --checking FENA and UA  --recheck Creat after fluids  --consider switch to carbo pending trend    Nausea: using compazine and zofran prn. No ativan    Nutrition: weight stable (4/29 seems like outlier). No limit on PO intake yet.     Anxiety: evident during visit. Patient has tried a number of medications in the past. Referral placed to psychiatry last week, watch for appt to be scheduled. He is also scheduled with palliative next Friday and then could offer management as well. He is on celexa 40 mg daily. He is currently on the maximum dose of Celexa at 40 mg daily. Upon chart review, it appears he has previously been on Abilify, buspirone, escitalopram, sertraline, and hydroxyzine.    Anticipated acute on chronic opioid use: Uses hydrocodone-acetaminophen 4x daily at baseline for his sinuses, has had longstanding issues with that and has tried many many interventions without success. We again reviewed that we will assist in managing his cancer treatment associated pain through his treatment. He is also set up to meet with Dr. Serrano on 5/14.   --he states that his anxiety will go down once he knows we will refill medication for him. I reassured him of this.   --now using 5 norco per day for pain control/throat pain. This is controlled today.     Anxiety: Uses alprazolam nightly along with zolpidem. Would rather avoid benzodiazepines to manage this. Avoid lorazepam. Psychiatry as above.     ASCVD:  See note by Dr. Ch, Cardiology, from 10/20/20. Confirmed he stopped lisinopril and metoprolol increased, but he cannot recall what dose. BP slightly high still today but anxiety could contribute. Monitor trend    Exercise: He can continue going to the gym as he tolerates.     COVID: Has been vaccinated.    Alcohol use: Recommend minimizing use through his cancer  treatment.     80 minutes spent on the date of the encounter doing chart review, review of test results, interpretation of tests, patient visit, documentation, discussion with other provider(s) and discussion with family     Leisa Zarate CNP on 5/5/2021 at 10:10 AM

## 2021-05-05 ENCOUNTER — INFUSION THERAPY VISIT (OUTPATIENT)
Dept: ONCOLOGY | Facility: CLINIC | Age: 71
End: 2021-05-05
Attending: INTERNAL MEDICINE
Payer: MEDICARE

## 2021-05-05 ENCOUNTER — THERAPY VISIT (OUTPATIENT)
Dept: SPEECH THERAPY | Facility: CLINIC | Age: 71
End: 2021-05-05
Payer: MEDICARE

## 2021-05-05 ENCOUNTER — APPOINTMENT (OUTPATIENT)
Dept: LAB | Facility: CLINIC | Age: 71
End: 2021-05-05
Attending: INTERNAL MEDICINE
Payer: MEDICARE

## 2021-05-05 ENCOUNTER — APPOINTMENT (OUTPATIENT)
Dept: RADIATION ONCOLOGY | Facility: CLINIC | Age: 71
End: 2021-05-05
Attending: RADIOLOGY
Payer: MEDICARE

## 2021-05-05 VITALS
WEIGHT: 225.3 LBS | DIASTOLIC BLOOD PRESSURE: 85 MMHG | OXYGEN SATURATION: 96 % | SYSTOLIC BLOOD PRESSURE: 151 MMHG | TEMPERATURE: 98.4 F | BODY MASS INDEX: 33.75 KG/M2 | HEART RATE: 80 BPM

## 2021-05-05 DIAGNOSIS — N18.2 CKD (CHRONIC KIDNEY DISEASE) STAGE 2, GFR 60-89 ML/MIN: ICD-10-CM

## 2021-05-05 DIAGNOSIS — F41.9 ANXIETY: ICD-10-CM

## 2021-05-05 DIAGNOSIS — C09.9 SQUAMOUS CELL CARCINOMA OF LEFT TONSIL (H): Primary | ICD-10-CM

## 2021-05-05 DIAGNOSIS — E83.42 HYPOMAGNESEMIA: ICD-10-CM

## 2021-05-05 DIAGNOSIS — R13.12 OROPHARYNGEAL DYSPHAGIA: Primary | ICD-10-CM

## 2021-05-05 DIAGNOSIS — C09.9 SQUAMOUS CELL CARCINOMA OF TONSIL (H): ICD-10-CM

## 2021-05-05 LAB
ALBUMIN SERPL-MCNC: 3.5 G/DL (ref 3.4–5)
ALBUMIN UR-MCNC: NEGATIVE MG/DL
ALP SERPL-CCNC: 91 U/L (ref 40–150)
ALT SERPL W P-5'-P-CCNC: 55 U/L (ref 0–70)
ANION GAP SERPL CALCULATED.3IONS-SCNC: 6 MMOL/L (ref 3–14)
APPEARANCE UR: CLEAR
AST SERPL W P-5'-P-CCNC: 38 U/L (ref 0–45)
BASOPHILS # BLD AUTO: 0 10E9/L (ref 0–0.2)
BASOPHILS NFR BLD AUTO: 0.1 %
BILIRUB SERPL-MCNC: 0.5 MG/DL (ref 0.2–1.3)
BILIRUB UR QL STRIP: NEGATIVE
BUN SERPL-MCNC: 23 MG/DL (ref 7–30)
CALCIUM SERPL-MCNC: 9.2 MG/DL (ref 8.5–10.1)
CHLORIDE SERPL-SCNC: 106 MMOL/L (ref 94–109)
CO2 SERPL-SCNC: 25 MMOL/L (ref 20–32)
COLOR UR AUTO: YELLOW
CREAT SERPL-MCNC: 1.13 MG/DL (ref 0.66–1.25)
CREAT SERPL-MCNC: 1.35 MG/DL (ref 0.66–1.25)
CREAT UR-MCNC: 73 MG/DL
DIFFERENTIAL METHOD BLD: NORMAL
EOSINOPHIL # BLD AUTO: 0.1 10E9/L (ref 0–0.7)
EOSINOPHIL NFR BLD AUTO: 1.9 %
ERYTHROCYTE [DISTWIDTH] IN BLOOD BY AUTOMATED COUNT: 14 % (ref 10–15)
FRACT EXCRET NA UR+SERPL-RTO: 0.8 %
GFR SERPL CREATININE-BSD FRML MDRD: 53 ML/MIN/{1.73_M2}
GFR SERPL CREATININE-BSD FRML MDRD: 66 ML/MIN/{1.73_M2}
GLUCOSE SERPL-MCNC: 101 MG/DL (ref 70–99)
GLUCOSE UR STRIP-MCNC: NEGATIVE MG/DL
HCT VFR BLD AUTO: 42.5 % (ref 40–53)
HGB BLD-MCNC: 14 G/DL (ref 13.3–17.7)
HGB UR QL STRIP: NEGATIVE
IMM GRANULOCYTES # BLD: 0 10E9/L (ref 0–0.4)
IMM GRANULOCYTES NFR BLD: 0.3 %
KETONES UR STRIP-MCNC: NEGATIVE MG/DL
LEUKOCYTE ESTERASE UR QL STRIP: NEGATIVE
LYMPHOCYTES # BLD AUTO: 0.9 10E9/L (ref 0.8–5.3)
LYMPHOCYTES NFR BLD AUTO: 12.7 %
MAGNESIUM SERPL-MCNC: 2.2 MG/DL (ref 1.6–2.3)
MCH RBC QN AUTO: 27.8 PG (ref 26.5–33)
MCHC RBC AUTO-ENTMCNC: 32.9 G/DL (ref 31.5–36.5)
MCV RBC AUTO: 85 FL (ref 78–100)
MONOCYTES # BLD AUTO: 0.9 10E9/L (ref 0–1.3)
MONOCYTES NFR BLD AUTO: 12.2 %
NEUTROPHILS # BLD AUTO: 5.4 10E9/L (ref 1.6–8.3)
NEUTROPHILS NFR BLD AUTO: 72.8 %
NITRATE UR QL: NEGATIVE
NRBC # BLD AUTO: 0 10*3/UL
NRBC BLD AUTO-RTO: 0 /100
PH UR STRIP: 5 PH (ref 5–7)
PLATELET # BLD AUTO: 194 10E9/L (ref 150–450)
POTASSIUM SERPL-SCNC: 4.4 MMOL/L (ref 3.4–5.3)
PROT SERPL-MCNC: 7.2 G/DL (ref 6.8–8.8)
RBC # BLD AUTO: 5.03 10E12/L (ref 4.4–5.9)
RBC #/AREA URNS AUTO: <1 /HPF (ref 0–2)
SODIUM SERPL-SCNC: 137 MMOL/L (ref 133–144)
SODIUM UR-SCNC: 67 MMOL/L
SOURCE: NORMAL
SP GR UR STRIP: 1.01 (ref 1–1.03)
UROBILINOGEN UR STRIP-MCNC: 0 MG/DL (ref 0–2)
WBC # BLD AUTO: 7.4 10E9/L (ref 4–11)
WBC #/AREA URNS AUTO: <1 /HPF (ref 0–5)

## 2021-05-05 PROCEDURE — 82565 ASSAY OF CREATININE: CPT | Mod: 91 | Performed by: NURSE PRACTITIONER

## 2021-05-05 PROCEDURE — 250N000011 HC RX IP 250 OP 636: Performed by: NURSE PRACTITIONER

## 2021-05-05 PROCEDURE — 85025 COMPLETE CBC W/AUTO DIFF WBC: CPT | Performed by: INTERNAL MEDICINE

## 2021-05-05 PROCEDURE — 96413 CHEMO IV INFUSION 1 HR: CPT

## 2021-05-05 PROCEDURE — 92526 ORAL FUNCTION THERAPY: CPT | Mod: GN | Performed by: SPEECH-LANGUAGE PATHOLOGIST

## 2021-05-05 PROCEDURE — G0463 HOSPITAL OUTPT CLINIC VISIT: HCPCS

## 2021-05-05 PROCEDURE — 77386 HC IMRT TREATMENT DELIVERY, COMPLEX: CPT | Performed by: RADIOLOGY

## 2021-05-05 PROCEDURE — 99417 PROLNG OP E/M EACH 15 MIN: CPT | Performed by: NURSE PRACTITIONER

## 2021-05-05 PROCEDURE — 81001 URINALYSIS AUTO W/SCOPE: CPT | Performed by: NURSE PRACTITIONER

## 2021-05-05 PROCEDURE — 80053 COMPREHEN METABOLIC PANEL: CPT | Performed by: INTERNAL MEDICINE

## 2021-05-05 PROCEDURE — 82570 ASSAY OF URINE CREATININE: CPT | Performed by: NURSE PRACTITIONER

## 2021-05-05 PROCEDURE — 96367 TX/PROPH/DG ADDL SEQ IV INF: CPT

## 2021-05-05 PROCEDURE — 83735 ASSAY OF MAGNESIUM: CPT | Performed by: INTERNAL MEDICINE

## 2021-05-05 PROCEDURE — 84300 ASSAY OF URINE SODIUM: CPT | Performed by: NURSE PRACTITIONER

## 2021-05-05 PROCEDURE — 99215 OFFICE O/P EST HI 40 MIN: CPT | Performed by: NURSE PRACTITIONER

## 2021-05-05 PROCEDURE — 258N000003 HC RX IP 258 OP 636: Performed by: NURSE PRACTITIONER

## 2021-05-05 PROCEDURE — 96375 TX/PRO/DX INJ NEW DRUG ADDON: CPT

## 2021-05-05 RX ORDER — SODIUM CHLORIDE 9 MG/ML
1000 INJECTION, SOLUTION INTRAVENOUS CONTINUOUS PRN
Status: CANCELLED
Start: 2021-05-05

## 2021-05-05 RX ORDER — METHYLPREDNISOLONE SODIUM SUCCINATE 125 MG/2ML
125 INJECTION, POWDER, LYOPHILIZED, FOR SOLUTION INTRAMUSCULAR; INTRAVENOUS
Status: CANCELLED
Start: 2021-05-05

## 2021-05-05 RX ORDER — ALBUTEROL SULFATE 90 UG/1
1-2 AEROSOL, METERED RESPIRATORY (INHALATION)
Status: CANCELLED
Start: 2021-05-05

## 2021-05-05 RX ORDER — MEPERIDINE HYDROCHLORIDE 25 MG/ML
25 INJECTION INTRAMUSCULAR; INTRAVENOUS; SUBCUTANEOUS EVERY 30 MIN PRN
Status: CANCELLED | OUTPATIENT
Start: 2021-05-05

## 2021-05-05 RX ORDER — PALONOSETRON 0.05 MG/ML
0.25 INJECTION, SOLUTION INTRAVENOUS ONCE
Status: CANCELLED
Start: 2021-05-05

## 2021-05-05 RX ORDER — ALBUTEROL SULFATE 0.83 MG/ML
2.5 SOLUTION RESPIRATORY (INHALATION)
Status: CANCELLED | OUTPATIENT
Start: 2021-05-05

## 2021-05-05 RX ORDER — DIPHENHYDRAMINE HYDROCHLORIDE 50 MG/ML
50 INJECTION INTRAMUSCULAR; INTRAVENOUS
Status: CANCELLED
Start: 2021-05-05

## 2021-05-05 RX ORDER — PALONOSETRON 0.05 MG/ML
0.25 INJECTION, SOLUTION INTRAVENOUS ONCE
Status: COMPLETED | OUTPATIENT
Start: 2021-05-05 | End: 2021-05-05

## 2021-05-05 RX ORDER — LORAZEPAM 2 MG/ML
0.5 INJECTION INTRAMUSCULAR EVERY 4 HOURS PRN
Status: CANCELLED
Start: 2021-05-05

## 2021-05-05 RX ORDER — NALOXONE HYDROCHLORIDE 0.4 MG/ML
.1-.4 INJECTION, SOLUTION INTRAMUSCULAR; INTRAVENOUS; SUBCUTANEOUS
Status: CANCELLED | OUTPATIENT
Start: 2021-05-05

## 2021-05-05 RX ORDER — EPINEPHRINE 1 MG/ML
0.3 INJECTION, SOLUTION INTRAMUSCULAR; SUBCUTANEOUS EVERY 5 MIN PRN
Status: CANCELLED | OUTPATIENT
Start: 2021-05-05

## 2021-05-05 RX ORDER — HEPARIN SODIUM,PORCINE 10 UNIT/ML
5 VIAL (ML) INTRAVENOUS
Status: CANCELLED | OUTPATIENT
Start: 2021-05-05

## 2021-05-05 RX ORDER — HEPARIN SODIUM (PORCINE) LOCK FLUSH IV SOLN 100 UNIT/ML 100 UNIT/ML
5 SOLUTION INTRAVENOUS
Status: CANCELLED | OUTPATIENT
Start: 2021-05-05

## 2021-05-05 RX ADMIN — PALONOSETRON HYDROCHLORIDE 0.25 MG: 0.25 INJECTION, SOLUTION INTRAVENOUS at 09:55

## 2021-05-05 RX ADMIN — DEXAMETHASONE SODIUM PHOSPHATE: 10 INJECTION, SOLUTION INTRAMUSCULAR; INTRAVENOUS at 10:02

## 2021-05-05 RX ADMIN — CISPLATIN 90 MG: 1 INJECTION, SOLUTION INTRAVENOUS at 11:51

## 2021-05-05 RX ADMIN — MAGNESIUM SULFATE HEPTAHYDRATE: 500 INJECTION, SOLUTION INTRAMUSCULAR; INTRAVENOUS at 10:26

## 2021-05-05 ASSESSMENT — PAIN SCALES - GENERAL: PAINLEVEL: NO PAIN (0)

## 2021-05-05 NOTE — PROGRESS NOTES
Infusion Nursing Note:  Quan Murphy presents today for C1 D8 Cisplatin.    Patient seen by provider today: Yes: Leisa Zarate NP   present during visit today: Not Applicable.    Note: pt had provider visit prior to infusion.  Pt states he is feeling well for treatment.  Reviewed today's plan of care with patient.  UA sent  Post Cisplatitin Cr drawn    Intravenous Access:  Peripheral IV placed.     Treatment Conditions:  Lab Results   Component Value Date    HGB 14.0 05/05/2021     Lab Results   Component Value Date    WBC 7.4 05/05/2021      Lab Results   Component Value Date    ANEU 5.4 05/05/2021     Lab Results   Component Value Date     05/05/2021      Lab Results   Component Value Date     05/05/2021                   Lab Results   Component Value Date    POTASSIUM 4.4 05/05/2021           Lab Results   Component Value Date    MAG 2.2 05/05/2021            Lab Results   Component Value Date    CR 1.35 05/05/2021                   Lab Results   Component Value Date    LATRELL 9.2 05/05/2021                Lab Results   Component Value Date    BILITOTAL 0.5 05/05/2021           Lab Results   Component Value Date    ALBUMIN 3.5 05/05/2021                    Lab Results   Component Value Date    ALT 55 05/05/2021           Lab Results   Component Value Date    AST 38 05/05/2021       Results reviewed, labs MET treatment parameters, ok to proceed with treatment.      Post Infusion Assessment:  Patient tolerated infusion without incident.  Blood return noted pre and post infusion.  Site patent and intact, free from redness, edema or discomfort.  No evidence of extravasations.  Access discontinued per protocol.       Discharge Plan:   Patient declined prescription refills.  Discharge instructions reviewed with: Patient.  Patient and/or family verbalized understanding of discharge instructions and all questions answered.  Copy of AVS reviewed with patient and/or family.  Patient will return  5/12/21 for next appointment.  Patient discharged in stable condition accompanied by: self.  Departure Mode: Ambulatory.    Chastity Arriola RN

## 2021-05-05 NOTE — PROGRESS NOTES
Chief Complaint   Patient presents with     Blood Draw     PIV blood draw, vitals taken and checked into next appointment     Blood drawn from left wrist.    -Krisatl GANT CMA

## 2021-05-05 NOTE — PATIENT INSTRUCTIONS
[facility] Triage and after hours / weekends / holidays:  [phone]    Please call the triage or after hours line if you experience a temperature greater than or equal to 100.5, shaking chills, have uncontrolled nausea, vomiting and/or diarrhea, dizziness, shortness of breath, chest pain, bleeding, unexplained bruising, or if you have any other new/concerning symptoms, questions or concerns.      If you are having any concerning symptoms or wish to speak to a provider before your next infusion visit, please call your care coordinator or triage to notify them so we can adequately serve you.     If you need a refill on a narcotic prescription or other medication, please call before your infusion appointment.                 May 2021      [place] Monday Tuesday Wednesday Thursday Friday Saturday                                 1       2     3    TREATMENT  12:45 PM   (30 min.)   [facility] RAD ONC VARIAN   [facility] Radiation Oncology 4    TREATMENT  12:45 PM   (30 min.)   [facility] RAD ONC [facility] Radiation Oncology 5    LAB PERIPHERAL   7:30 AM   (15 min.)    [facility] DRAW   [facility]    RETURN   8:00 AM   (45 min.)   Leisa Zarate, CNP   [facility]    [facility] ONC INFUSION 360   9:00 AM   (360 min.)    ONC INFUSION NURSE   [facility]    SWALLOW TREATMENT  10:00 AM   (30 min.)   Judy Bunch, SLP   [facility]    TREATMENT  12:45 PM   (30 min.)   [facility] RAD ONC VARIAN   [facility] Radiation Oncology 6    TREATMENT  12:45 PM   (30 min.)   [facility] RAD ONC [facility] Radiation Oncology    OTV   1:15 PM   (15 min.)   Ahmet Robbins MD   [facility] Radiation Oncology 7    TREATMENT  12:45 PM   (30 min.)   [facility] RAD ONC [facility] Radiation Oncology 8       9     10    TREATMENT  12:45 PM   (30  min.)   [facility] RAD ONC [facility] Radiation Oncology 11    TREATMENT  12:45 PM   (30 min.)   [facility] RAD ONC [facility] Radiation Oncology 12    LAB PERIPHERAL   7:30 AM   (15 min.)   [facility]   [facility]    RETURN   8:00 AM   (45 min.)   Leisa Zarate CNP   [facility]    [facility] ONC INFUSION 360   9:00 AM   (360 min.)   [facility] ONC INFUSION NURSE   [facility]    TREATMENT  12:45 PM   (30 min.)   [facility] RAD ONC [facility] Radiation Oncology 13    TREATMENT  12:45 PM   (30 min.)   [facility] RAD ONC [facility] Radiation Oncology    OTV   1:15 PM   (15 min.)   Ahmet Robbins MD   [facility] Radiation Oncology 14    NEW   9:05 AM   (80 min.)   Elier Serrano MD   [facility]    TREATMENT  12:45 PM   (30 min.)   [facility] RAD ONC [facility] Radiation Oncology 15       16     17    TELEPHONE VISIT RETURN   9:00 AM   (30 min.)   Crystal Marino RD   [facility]    TREATMENT  12:45 PM   (30 min.)   [facility] RAD ONC [facility] Radiation Oncology 18    TREATMENT  12:45 PM   (30 min.)   [facility] RAD ONC [facility] Radiation Oncology 19    LAB PERIPHERAL   7:30 AM   (15 min.)   [facility]   [facility]    RETURN   8:00 AM   (45 min.)   [facility]   9:00 AM   (360 min.)   [facility] ONC INFUSION NURSE   [facility]    SWALLOW TREATMENT   9:30 AM   (30 min.)   Reba Oshea SLP   [facility]    TREATMENT  12:45 PM   (30 min.)   [facility] RAD ONC [facility] Radiation Oncology 20    TREATMENT  12:45 PM   (30 min.)   [facility] RAD ONC [facility]  [facility] Radiation Oncology    OTV   1:15 PM   (15 min.)   Ahmet Robbins MD   [facility] Radiation Oncology 21    TREATMENT  12:45 PM   (30 min.)   [facility] RAD ONC VARIAN   [facility] Radiation Oncology 22       23     24    TREATMENT  12:45 PM   (30 min.)   [facility] RAD ONC VARIAN   [facility] Radiation Oncology 25    TREATMENT  12:45 PM   (30 min.)   [facility] RAD ONC [facility] Radiation Oncology 26    TREATMENT  12:45 PM   (30 min.)   [facility] RAD ONC [facility] Radiation Oncology    LAB PERIPHERAL   1:30 PM   (15 min.)   [facility]   [facility]    RETURN   1:45 PM   (30 min.)   Shanthi Schrader MD   [facility] 27    [facility] ONC INFUSION 360   9:00 AM   (360 min.)   [facility] ONC INFUSION NURSE   [facility]    TREATMENT  12:45 PM   (30 min.)   [facility] RAD ONC [facility] Radiation Oncology    OTV   1:15 PM   (15 min.)   Ahmet Robbins MD   [facility] Radiation Oncology 28    TREATMENT  12:45 PM   (30 min.)   [facility] RAD ONC [facility] Radiation Oncology 29       30     31 June 2021 Sunday Monday Tuesday Wednesday Thursday Friday Saturday             1    TREATMENT  12:45 PM   (30 min.)   [facility] RAD ONC VARIAN   [facility] Radiation Oncology 2    LAB PERIPHERAL   7:30 AM   (15 min.)   [facility]   [facility]    RETURN   7:55 AM   (50 min.)   Leisa Zarate CNP   [facility]    [facility] ONC INFUSION 360   9:00 AM   (360 min.)   [facility] ONC INFUSION NURSE   [facility]    SWALLOW TREATMENT   9:30 AM   (30 min.)   Reba Oshea, SLP   [facility]    TREATMENT  12:45 PM   (30 min.)   [facility] RAD ONC [facility]  Radiation Oncology 3    TREATMENT  12:45 PM   (30 min.)   [facility] RAD ONC VARIAN   [facility] Radiation Oncology    OTV   1:15 PM   (15 min.)   Ahmet Robbins MD   [facility] Radiation Oncology 4    TREATMENT  12:45 PM   (30 min.)   [facility] RAD ONC VARIAN   [facility] Radiation Oncology 5       6     7    TREATMENT  12:45 PM   (30 min.)   [facility] RAD ONC [facility] Radiation Oncology 8    LAB PERIPHERAL   8:30 AM   (15 min.)   [facility]   [facility]    RETURN   8:45 AM   (30 min.)   Shanthi Schrader MD   [facility]    [facility] ONC INFUSION 120  10:00 AM   (120 min.)   UC ONC INFUSION NURSE   [facility]    TREATMENT  12:45 PM   (30 min.)   [facility] RAD ONC VARIAN   [facility] Radiation Oncology 9    TREATMENT  12:45 PM   (30 min.)   [facility] ONC [facility] Radiation Oncology 10    TREATMENT  12:45 PM   (30 min.)   [facility] RAD ONC [facility] Radiation On[facility]    OTV   1:15 PM   (15 min.)   Ahmet Robbins MD   [facility] Radiation Oncology 11    TREATMENT  12:45 PM   (30 min.)   [facility] RAD ONC [facility] Radiation Oncology 12       13     14    TREATMENT  12:45 PM   (30 min.)   [facility] ONC [facility] Radiation Oncology 15     16    SWALLOW TREATMENT   9:30 AM   (30 min.)   Reba Oshea SLP   [facility] 17     18     19       20     21     22     23     24     25     26       27     28     29     30    SWALLOW TREATMENT   9:30 AM   (30 min.)   Reba Oshea SLP   [facility]                               Recent Results (from the past 24 hour(s))   CBC with platelets differential    Collection Time: 05/05/21  8:00 AM   Result Value Ref Range    WBC 7.4 4.0 - 11.0 10e9/L    RBC Count 5.03 4.4 - 5.9  10e12/L    Hemoglobin 14.0 13.3 - 17.7 g/dL    Hematocrit 42.5 40.0 - 53.0 %    MCV 85 78 - 100 fl    MCH 27.8 26.5 - 33.0 pg    MCHC 32.9 31.5 - 36.5 g/dL    RDW 14.0 10.0 - 15.0 %    Platelet Count 194 150 - 450 10e9/L    Diff Method Automated Method     % Neutrophils 72.8 %    % Lymphocytes 12.7 %    % Monocytes 12.2 %    % Eosinophils 1.9 %    % Basophils 0.1 %    % Immature Granulocytes 0.3 %    Nucleated RBCs 0 0 /100    Absolute Neutrophil 5.4 1.6 - 8.3 10e9/L    Absolute Lymphocytes 0.9 0.8 - 5.3 10e9/L    Absolute Monocytes 0.9 0.0 - 1.3 10e9/L    Absolute Eosinophils 0.1 0.0 - 0.7 10e9/L    Absolute Basophils 0.0 0.0 - 0.2 10e9/L    Abs Immature Granulocytes 0.0 0 - 0.4 10e9/L    Absolute Nucleated RBC 0.0    Comprehensive metabolic panel    Collection Time: 05/05/21  8:00 AM   Result Value Ref Range    Sodium 137 133 - 144 mmol/L    Potassium 4.4 3.4 - 5.3 mmol/L    Chloride 106 94 - 109 mmol/L    Carbon Dioxide 25 20 - 32 mmol/L    Anion Gap 6 3 - 14 mmol/L    Glucose 101 (H) 70 - 99 mg/dL    Urea Nitrogen 23 7 - 30 mg/dL    Creatinine 1.35 (H) 0.66 - 1.25 mg/dL    GFR Estimate 53 (L) >60 mL/min/[1.73_m2]    GFR Estimate If Black 61 >60 mL/min/[1.73_m2]    Calcium 9.2 8.5 - 10.1 mg/dL    Bilirubin Total 0.5 0.2 - 1.3 mg/dL    Albumin 3.5 3.4 - 5.0 g/dL    Protein Total 7.2 6.8 - 8.8 g/dL    Alkaline Phosphatase 91 40 - 150 U/L    ALT 55 0 - 70 U/L    AST 38 0 - 45 U/L   Magnesium    Collection Time: 05/05/21  8:00 AM   Result Value Ref Range    Magnesium 2.2 1.6 - 2.3 mg/dL   Routine UA with micro reflex to culture    Collection Time: 05/05/21 10:40 AM    Specimen: Unspecified Urine   Result Value Ref Range    Color Urine Yellow     Appearance Urine Clear     Glucose Urine Negative NEG^Negative mg/dL    Bilirubin Urine Negative NEG^Negative    Ketones Urine Negative NEG^Negative mg/dL    Specific Gravity Urine 1.014 1.003 - 1.035    Blood Urine Negative NEG^Negative    pH Urine 5.0 5.0 - 7.0 pH     Protein Albumin Urine Negative NEG^Negative mg/dL    Urobilinogen mg/dL 0.0 0.0 - 2.0 mg/dL    Nitrite Urine Negative NEG^Negative    Leukocyte Esterase Urine Negative NEG^Negative    Source Unspecified Urine     WBC Urine <1 0 - 5 /HPF    RBC Urine <1 0 - 2 /HPF

## 2021-05-05 NOTE — LETTER
5/5/2021         RE: Quan Murphy  [address] [facility] [address]        Dear Colleague,    Thank you for referring your patient, Quan Murphy, to the [facility]. Please see a copy of my visit note below.    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 Christensen). Path: SCC, non keratinizing, p16 +gardenia  3/23/21 CT neck. 2.9 x 2.7 x 3.5 cm L tonsil fullness involving soft palate, level 2-3 neck nodes  4/2/21 PET/CT. 2.7 x 2.2 x 2.7 cm L palatine tonsil mass (SUV 24), extension to oral cavity, 3.4 x 2.0 cm L level 4 node invading SCM, 1.8 x 1.8 cm L level 2A/3 node (SUV 7.9) w/ECS, 1.4 x 0.9 cm R level 2A node (SUV 6.7)    Concurrent chemoradiation with weekly cisplatin began 4/28/21    SUBJECTIVE  New is feeling okay today. He notes some intermittent nausea so is taking compazine 3x per day along with zofran as needed. His bowels are moving, he takes stool softeners as needed.     Perhaps has low buzz in his ears but did not really notice until I asked about it. As far as neuropathy, he notes soft tingling in his fingertips. He has still been able to play his guitar which he enjoys.     Throat is uncomfortable when he does swallowing exercises but overall pain is controlled with prn norco q4 hours, he takes 5 total throughout the day . He has no limitation of eating right now but they have purchased high protein powder in anticipation. Feels he is staying well hydrated, drinking at least 8 glasses of water per day. No  dizziness or urinary concerns.     He stopped his lisinopril and increased his metoprolol but cannot remember to what dose.    He denies other concerns.    REVIEW OF SYSTEMS  Patient denies any of the following except if noted above: fevers, chills, difficulty with energy, vision or hearing changes, chest pain, dyspnea, abdominal pain, nausea, diarrhea, constipation, new urinary concerns, headaches, numbness, or tingling.     CURRENT OUTPATIENT MEDICATIONS  Current Outpatient Medications   Medication Sig Dispense Refill     ALPRAZolam (XANAX) 0.5 MG tablet TAKE 1 TABLET (0.5 MG) BY MOUTH 4 TIMES DAILY AS NEEDED FOR ANXIETY 120 tablet 1     ASPIRIN ADULT LOW STRENGTH 81 MG EC tablet TAKE ONE TABLET BY MOUTH ONCE DAILY 90 tablet 1     citalopram (CELEXA) 40 MG tablet TAKE ONE TABLET BY MOUTH ONCE DAILY 30 tablet 8     HYDROcodone-acetaminophen (NORCO) 7.5-325 MG per tablet Take 1 tablet by mouth every 4 hours as needed for moderate to severe pain maximum 5 tablet(s) per day 150 tablet 0     hydrOXYzine (VISTARIL) 25 MG capsule TAKE 1 CAPSULE (25 MG) BY MOUTH 4 TIMES DAILY AS NEEDED FOR ANXIETY 120 capsule 3     metoprolol succinate ER (TOPROL-XL) 50 MG 24 hr tablet Take 1 tablet (50 mg) by mouth daily 90 tablet 3     nitroGLYcerin (NITROSTAT) 0.4 MG sublingual tablet For chest pain place 1 tablet under the tongue every 5 minutes for 3 doses. If symptoms persist 5 minutes after 1st dose call 911. 25 tablet 0     omeprazole (PRILOSEC) 40 MG DR capsule Take 1 capsule (40 mg) by mouth daily 90 capsule 3     ondansetron (ZOFRAN-ODT) 8 MG ODT tab Take 1 tablet (8 mg) by mouth every 8 hours as needed for nausea 30 tablet 11     oxyCODONE (ROXICODONE) 5 MG tablet Take 1 tablet (5 mg) by mouth every 6 hours as needed for pain 12 tablet 0     prochlorperazine (COMPAZINE) 10 MG tablet Take 0.5 tablets (5 mg) by mouth every 6 hours as needed (Nausea/Vomiting) 30 tablet 11     rosuvastatin (CRESTOR) 40 MG tablet Take 1 tablet (40  mg) by mouth daily 90 tablet 1     testosterone (ANDROGEL 1.62 % PUMP) 20.25 MG/ACT gel testosterone 20.25 mg/1.25 gram (1.62 %) transdermal gel pump       triamcinolone (KENALOG) 0.1 % external cream        zolpidem (AMBIEN) 10 MG tablet TAKE ONE TABLET BY MOUTH EVERY EVENING AS NEEDED FOR SLEEP 30 tablet 5     PHYSICAL EXAM  General: The patient is a pleasant male in no acute distress.  There were no vitals taken for this visit.  Wt Readings from Last 10 Encounters:   04/29/21 105.7 kg (233 lb)   04/28/21 102.8 kg (226 lb 9.6 oz)   04/02/21 102 kg (224 lb 13.9 oz)   03/22/21 101.1 kg (222 lb 12.5 oz)   03/04/21 98.9 kg (218 lb)   10/20/20 99 kg (218 lb 4.8 oz)   07/15/20 99.3 kg (219 lb)   02/18/20 98.1 kg (216 lb 4.8 oz)   02/17/20 98.2 kg (216 lb 9.6 oz)   02/11/20 101.2 kg (223 lb)   HEENT: EOMI, PERRL. Sclerae are anicteric. Oral mucosa is pink and moist. Left tonsil with ulcerative lesion.   Lymph: Neck is supple with Level 3 left node about 2 cm in size palpable in the neck. level 2 node palpable in the neck about 2 cm in size, no other lymphadenopathy in the cervical or supraclavicular areas.   Heart: Regular rate and rhythm.   Lungs: Clear to auscultation bilaterally.   Abdomen: Bowel sounds present, soft, nontender with no palpable hepatosplenomegaly or masses.   Extremities: No lower extremity edema noted bilaterally.   Neuro: Cranial nerves II through XII are grossly intact.  Skin: No rashes, petechiae, or bruising noted on exposed skin.    LABORATORY AND IMAGING STUDIES     Ref. Range 5/5/2021 08:00   Sodium Latest Ref Range: 133 - 144 mmol/L 137   Potassium Latest Ref Range: 3.4 - 5.3 mmol/L 4.4   Chloride Latest Ref Range: 94 - 109 mmol/L 106   Carbon Dioxide Latest Ref Range: 20 - 32 mmol/L 25   Urea Nitrogen Latest Ref Range: 7 - 30 mg/dL 23   Creatinine Latest Ref Range: 0.66 - 1.25 mg/dL 1.35 (H)   GFR Estimate Latest Ref Range: >60 mL/min/1.73_m2 53 (L)   GFR Estimate If Black Latest Ref Range:  >60 mL/min/1.73_m2 61   Calcium Latest Ref Range: 8.5 - 10.1 mg/dL 9.2   Anion Gap Latest Ref Range: 3 - 14 mmol/L 6   Magnesium Latest Ref Range: 1.6 - 2.3 mg/dL 2.2   Albumin Latest Ref Range: 3.4 - 5.0 g/dL 3.5   Protein Total Latest Ref Range: 6.8 - 8.8 g/dL 7.2   Bilirubin Total Latest Ref Range: 0.2 - 1.3 mg/dL 0.5   Alkaline Phosphatase Latest Ref Range: 40 - 150 U/L 91   ALT Latest Ref Range: 0 - 70 U/L 55   AST Latest Ref Range: 0 - 45 U/L 38   Glucose Latest Ref Range: 70 - 99 mg/dL 101 (H)   WBC Latest Ref Range: 4.0 - 11.0 10e9/L 7.4   Hemoglobin Latest Ref Range: 13.3 - 17.7 g/dL 14.0   Hematocrit Latest Ref Range: 40.0 - 53.0 % 42.5   Platelet Count Latest Ref Range: 150 - 450 10e9/L 194   RBC Count Latest Ref Range: 4.4 - 5.9 10e12/L 5.03   MCV Latest Ref Range: 78 - 100 fl 85   MCH Latest Ref Range: 26.5 - 33.0 pg 27.8   MCHC Latest Ref Range: 31.5 - 36.5 g/dL 32.9   RDW Latest Ref Range: 10.0 - 15.0 % 14.0   Diff Method Unknown Automated Method   % Neutrophils Latest Units: % 72.8   % Lymphocytes Latest Units: % 12.7   % Monocytes Latest Units: % 12.2   % Eosinophils Latest Units: % 1.9   % Basophils Latest Units: % 0.1   % Immature Granulocytes Latest Units: % 0.3   Nucleated RBCs Latest Ref Range: 0 /100 0   Absolute Neutrophil Latest Ref Range: 1.6 - 8.3 10e9/L 5.4   Absolute Lymphocytes Latest Ref Range: 0.8 - 5.3 10e9/L 0.9   Absolute Monocytes Latest Ref Range: 0.0 - 1.3 10e9/L 0.9   Absolute Eosinophils Latest Ref Range: 0.0 - 0.7 10e9/L 0.1   Absolute Basophils Latest Ref Range: 0.0 - 0.2 10e9/L 0.0   Abs Immature Granulocytes Latest Ref Range: 0 - 0.4 10e9/L 0.0   Absolute Nucleated RBC Unknown 0.0       ASSESSMENT AND PLAN  SCC L tonsil, [id number], p16 +gardenia, ECS +gardenia: began chemotherapy 4/28/21 as a radiosensitizer to radiation with weekly cisplatin, recommended because of CKD with intermittent SURESH. Plan for about 6 doses concurrently with radiation. He tolerated first week okay ex some  nausea and kidney injury. Cr Cl within limits to proceed with same dose. Will update Dr. Schrader and see what her threshold is to switch to carbo.   --follow up weekly    SURESH on CKD; baseline looks to be around 1.1: 1.35 today. BUN wnl and he feels well hydrated so likely mostly 2/2 cisplatin. Getting about 1.5 L total fluid today with treatment and he will continue to push PO liquid ~80 oz daily.   --checking FENA and UA  --recheck Creat after fluids  --consider switch to carbo pending trend    Nausea: using compazine and zofran prn. No ativan    Nutrition: weight stable (4/29 seems like outlier). No limit on PO intake yet.     Anxiety: evident during visit. Patient has tried a number of medications in the past. Referral placed to psychiatry last week, watch for appt to be scheduled. He is also scheduled with palliative next Friday and then could offer management as well. He is on celexa 40 mg daily. He is currently on the maximum dose of Celexa at 40 mg daily. Upon chart review, it appears he has previously been on Abilify, buspirone, escitalopram, sertraline, and hydroxyzine.    Anticipated acute on chronic opioid use: Uses hydrocodone-acetaminophen 4x daily at baseline for his sinuses, has had longstanding issues with that and has tried many many interventions without success. We again reviewed that we will assist in managing his cancer treatment associated pain through his treatment. He is also set up to meet with Dr. Serrano on 5/14.   --he states that his anxiety will go down once he knows we will refill medication for him. I reassured him of this.   --now using 5 norco per day for pain control/throat pain. This is controlled today.     Anxiety: Uses alprazolam nightly along with zolpidem. Would rather avoid benzodiazepines to manage this. Avoid lorazepam. Psychiatry as above.     ASCVD:  See note by Dr. hC, Cardiology, from 10/20/20. Confirmed he stopped lisinopril and metoprolol increased, but he cannot  recall what dose. BP slightly high still today but anxiety could contribute. Monitor trend    Exercise: He can continue going to the gym as he tolerates.     COVID: Has been vaccinated.    Alcohol use: Recommend minimizing use through his cancer treatment.     80 minutes spent on the date of the encounter doing chart review, review of test results, interpretation of tests, patient visit, documentation, discussion with other provider(s) and discussion with family     Leisa Zarate CNP on 5/5/2021 at 10:10 AM             Chief Complaint   Patient presents with     Blood Draw     PIV blood draw, vitals taken and checked into next appointment     Blood drawn from left wrist.    -Kristal GANT CMA      Again, thank you for allowing me to participate in the care of your patient.        Sincerely,        Leisa Zarate CNP

## 2021-05-05 NOTE — NURSING NOTE
"Oncology Rooming Note    May 5, 2021 8:14 AM   Quan Murphy is a 70 year old male who presents for:    Chief Complaint   Patient presents with     Blood Draw     PIV blood draw, vitals taken and checked into next appointment     Oncology Clinic Visit     squamous cell carcinoma of tonsil      Initial Vitals: BP (!) 151/85   Pulse 80   Temp 98.4  F (36.9  C) (Oral)   Wt 102.2 kg (225 lb 4.8 oz)   SpO2 96%   BMI 33.75 kg/m   Estimated body mass index is 33.75 kg/m  as calculated from the following:    Height as of 4/28/21: 1.74 m (5' 8.5\").    Weight as of this encounter: 102.2 kg (225 lb 4.8 oz). Body surface area is 2.22 meters squared.  No Pain (0) Comment: Data Unavailable   No LMP for male patient.  Allergies reviewed: Yes  Medications reviewed: Yes    Medications: Medication refills not needed today.  Pharmacy name entered into [facility]:    [facility] [place], [place] - [address] [place] [address] [place] [address] - [address]    Clinical concerns: none       Benita Walker CMA              "

## 2021-05-06 ENCOUNTER — APPOINTMENT (OUTPATIENT)
Dept: RADIATION ONCOLOGY | Facility: CLINIC | Age: 71
End: 2021-05-06
Attending: RADIOLOGY
Payer: MEDICARE

## 2021-05-06 VITALS
BODY MASS INDEX: 34.16 KG/M2 | WEIGHT: 228 LBS | DIASTOLIC BLOOD PRESSURE: 107 MMHG | HEART RATE: 75 BPM | SYSTOLIC BLOOD PRESSURE: 152 MMHG

## 2021-05-06 DIAGNOSIS — C09.9 SQUAMOUS CELL CARCINOMA OF LEFT TONSIL (H): Primary | ICD-10-CM

## 2021-05-06 PROCEDURE — 77386 HC IMRT TREATMENT DELIVERY, COMPLEX: CPT | Performed by: RADIOLOGY

## 2021-05-06 NOTE — PROGRESS NOTES
RADIATION ONCOLOGY WEEKLY ON TREATMENT VISIT   Encounter Date: May 6, 2021    Patient Name: Quan Murphy  MRN: [id number]  : 1950     Disease and Stage: cT2 N2 M0 p16 positive squamous cell carcinoma of the left tonsil  Treatment Site: Oropharynx and bilateral neck  Current Dose/Planned Total Dose: 1484 / 6996 cGy  Daily Fraction Size: 212 cGy/day, 5 times/week  Concurrent Chemotherapy: Yes  Drug and Frequency: Cisplatin (40 mg/m ) weekly    Treatment Summary:    2021: Initiation of radiotherapy. Cycle 1, day 1 of weekly cisplatin.    2021: Weekly RT visit. Current dose of 424 cGy. Tolerating treatment well. No issues.    2021: Cycle 1, day 8 of weekly cisplatin.    2021: Weekly RT visit. Current dose of 1484 cGy. Tolerating treatment well.    ED visits/Hospitalizations:  None    Missed Treatments:  None    Subjective: Mr. Murphy presents to clinic today for his weekly on-treatment visit. He continues to tolerate head and neck chemoradiotherapy very well and has no pressing concerns or complaints on examination. He is eating a regular diet without difficulty and denies any dysphagia or odynophagia. He remains on Norco for treatment of his long-standing chronic pain and reports that his oral cavity/oropharyngeal symptoms are well-controlled at this time. He is compliant with his recommended skin/oral cares and his remaining ROS are otherwise negative.    ROS:   Constitutional  Pain (0-10): 0  Fatigue: None    CNS  Headaches: None    ENT  Mucositis: None    Cardiopulmonary  Dyspnea: None    GI  Nausea/vomiting: None    Nutrition  PEG: No  Diet: Regular    Integumentary  Dermatitis: None    Objective:   Current weight: 103.4 kg  Last week's weight: 105.7 kg  Starting weight: 105.7 kg    BP: 152/107 (sitting), 171/100 (standing)  Pulse: 75 (sitting), 79 (standing)     General: Healthy-appearing 70-year-old gentleman seated comfortably in an examination chair in no acute distress  HEENT:  NC/AT.  EOMI.  No rhinorrhea or epistaxis.  Moist mucous membranes.  There is a scant amount of patchy mucositis involving the left tonsillar fossa and the soft palate.  No oral thrush.  Pulmonary: No wheezing, stridor or respiratory distress  Skin: No erythema    Treatment-related toxicities (CTCAE v5.0):  None    Assessment:    Mr. 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 He is receiving curative-intent concurrent chemoradiation with weekly cisplatin. He is tolerating treatment well with no significant acute therapy-induced toxicities.    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:    Continue chemoradiotherapy    Pain management:    Continue current cares with prescribed Norco    Palliative care consult scheduled for 5/14/2021     Fluids/Electrolytes/Nutrition:    Continue diet as tolerated with caloric goals as recommended by the oncology dietitian    Dermatitis:    Continue twice daily Aquaphor use to the lower face bilateral neck    Mucositis:    Pain control as described above    Continue frequent salt/soda rinses    Anxiety:    Continue Celexa    Follow-up with psychiatry consult    Mosaiq chart and setup information reviewed  IGRT images reviewed    Medication list reviewed    Ahmet Robbins MD/PhD    Dept of Radiation Oncology  [facility]

## 2021-05-07 ENCOUNTER — APPOINTMENT (OUTPATIENT)
Dept: RADIATION ONCOLOGY | Facility: CLINIC | Age: 71
End: 2021-05-07
Attending: RADIOLOGY
Payer: MEDICARE

## 2021-05-07 PROCEDURE — 77386 HC IMRT TREATMENT DELIVERY, COMPLEX: CPT | Performed by: RADIOLOGY

## 2021-05-07 PROCEDURE — 77014 PR CT GUIDE FOR PLACEMENT RADIATION THERAPY FIELDS: CPT | Mod: 26 | Performed by: RADIOLOGY

## 2021-05-10 ENCOUNTER — APPOINTMENT (OUTPATIENT)
Dept: RADIATION ONCOLOGY | Facility: CLINIC | Age: 71
End: 2021-05-10
Attending: RADIOLOGY
Payer: MEDICARE

## 2021-05-10 PROCEDURE — 77386 HC IMRT TREATMENT DELIVERY, COMPLEX: CPT | Performed by: RADIOLOGY

## 2021-05-10 PROCEDURE — 77014 PR CT GUIDE FOR PLACEMENT RADIATION THERAPY FIELDS: CPT | Mod: 26 | Performed by: RADIOLOGY

## 2021-05-11 ENCOUNTER — APPOINTMENT (OUTPATIENT)
Dept: RADIATION ONCOLOGY | Facility: CLINIC | Age: 71
End: 2021-05-11
Attending: RADIOLOGY
Payer: MEDICARE

## 2021-05-11 PROCEDURE — 77386 HC IMRT TREATMENT DELIVERY, COMPLEX: CPT | Performed by: RADIOLOGY

## 2021-05-11 PROCEDURE — 77014 PR CT GUIDE FOR PLACEMENT RADIATION THERAPY FIELDS: CPT | Mod: 26 | Performed by: RADIOLOGY

## 2021-05-11 PROCEDURE — 77427 RADIATION TX MANAGEMENT X5: CPT | Performed by: RADIOLOGY

## 2021-05-11 PROCEDURE — 77336 RADIATION PHYSICS CONSULT: CPT | Performed by: RADIOLOGY

## 2021-05-11 NOTE — PROGRESS NOTES
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 Christensen). Path: SCC, non keratinizing, p16 +gardenia  3/23/21 CT neck. 2.9 x 2.7 x 3.5 cm L tonsil fullness involving soft palate, level 2-3 neck nodes  4/2/21 PET/CT. 2.7 x 2.2 x 2.7 cm L palatine tonsil mass (SUV 24), extension to oral cavity, 3.4 x 2.0 cm L level 4 node invading SCM, 1.8 x 1.8 cm L level 2A/3 node (SUV 7.9) w/ECS, 1.4 x 0.9 cm R level 2A node (SUV 6.7)    Concurrent chemoradiation with weekly cisplatin began 4/28/21    YENNY Wolff had an increase in pain since his visit with Dr. Robbins last week. Had a rougher weekend. He notes pain in his mouth so increased his norco from 1 tab every 4 hours to 2 tabs every 4 hours for relief. He has been taking Excedrin in addition to this. Doing less of swallowing exercises because of pain.     For nausea management, he is taking compazine TID and zofran only as needed for break through. No emesis and besides taste, he feels he is tolerating food intake okay. He is hungry in the morning but finds himself more snacking later in the day. Still feels he is hydrating okay. No dizziness or LH.    No f/c/breathing concerns. No other new concerns.      10 point review of systems otherwise negative    CURRENT OUTPATIENT MEDICATIONS  Current Outpatient Medications   Medication Sig Dispense Refill     ALPRAZolam (XANAX) 0.5 MG tablet TAKE 1 TABLET (0.5 MG) BY MOUTH 4 TIMES DAILY AS NEEDED FOR ANXIETY 120 tablet 1     ASPIRIN ADULT LOW STRENGTH 81 MG EC tablet TAKE ONE  TABLET BY MOUTH ONCE DAILY 90 tablet 1     citalopram (CELEXA) 40 MG tablet TAKE ONE TABLET BY MOUTH ONCE DAILY 30 tablet 8     HYDROcodone-acetaminophen (NORCO) 7.5-325 MG per tablet Take 1 tablet by mouth every 4 hours as needed for moderate to severe pain maximum 5 tablet(s) per day 150 tablet 0     hydrOXYzine (VISTARIL) 25 MG capsule TAKE 1 CAPSULE (25 MG) BY MOUTH 4 TIMES DAILY AS NEEDED FOR ANXIETY 120 capsule 3     metoprolol succinate ER (TOPROL-XL) 50 MG 24 hr tablet Take 1 tablet (50 mg) by mouth daily 90 tablet 3     nitroGLYcerin (NITROSTAT) 0.4 MG sublingual tablet For chest pain place 1 tablet under the tongue every 5 minutes for 3 doses. If symptoms persist 5 minutes after 1st dose call 911. 25 tablet 0     omeprazole (PRILOSEC) 40 MG DR capsule Take 1 capsule (40 mg) by mouth daily 90 capsule 3     ondansetron (ZOFRAN-ODT) 8 MG ODT tab Take 1 tablet (8 mg) by mouth every 8 hours as needed for nausea 30 tablet 11     oxyCODONE (ROXICODONE) 5 MG tablet Take 1 tablet (5 mg) by mouth every 6 hours as needed for pain 12 tablet 0     prochlorperazine (COMPAZINE) 10 MG tablet Take 0.5 tablets (5 mg) by mouth every 6 hours as needed (Nausea/Vomiting) 30 tablet 11     rosuvastatin (CRESTOR) 40 MG tablet Take 1 tablet (40 mg) by mouth daily 90 tablet 1     testosterone (ANDROGEL 1.62 % PUMP) 20.25 MG/ACT gel testosterone 20.25 mg/1.25 gram (1.62 %) transdermal gel pump       triamcinolone (KENALOG) 0.1 % external cream        zolpidem (AMBIEN) 10 MG tablet TAKE ONE TABLET BY MOUTH EVERY EVENING AS NEEDED FOR SLEEP 30 tablet 5     PHYSICAL EXAM  BP (!) 144/84 (BP Location: Right arm, Patient Position: Sitting, Cuff Size: Adult Large)   Pulse 70   Temp 98.2  F (36.8  C) (Tympanic)   Resp 16   Wt 101.3 kg (223 lb 6.4 oz)   SpO2 96%   BMI 33.47 kg/m    Wt Readings from Last 10 Encounters:   05/06/21 103.4 kg (228 lb)   05/05/21 102.2 kg (225 lb 4.8 oz)   04/29/21 105.7 kg (233 lb)   04/28/21 102.8 kg (226  lb 9.6 oz)   04/02/21 102 kg (224 lb 13.9 oz)   03/22/21 101.1 kg (222 lb 12.5 oz)   03/04/21 98.9 kg (218 lb)   10/20/20 99 kg (218 lb 4.8 oz)   07/15/20 99.3 kg (219 lb)   02/18/20 98.1 kg (216 lb 4.8 oz)   General: The patient is a pleasant male in no acute distress.  HEENT: EOMI, PERRL. Sclerae are anicteric. Oral mucosa is pink and moist. Left tonsil with ulcerative lesion.   Lymph: Neck is supple with Level 3 left node about 2 cm in size palpable in the neck. level 2 node less palpable today, no other lymphadenopathy in the cervical or supraclavicular areas.   Heart: Regular rate and rhythm.   Lungs: Clear to auscultation bilaterally.   Abdomen: Bowel sounds present, soft, nontender with no palpable hepatosplenomegaly or masses.   Extremities: No lower extremity edema noted bilaterally.   Neuro: Cranial nerves II through XII are grossly intact.  Skin: No rashes, petechiae, or bruising noted on exposed skin.    LABORATORY AND IMAGING STUDIES     Ref. Range 5/12/2021 07:59   Sodium Latest Ref Range: 133 - 144 mmol/L 136   Potassium Latest Ref Range: 3.4 - 5.3 mmol/L 3.9   Chloride Latest Ref Range: 94 - 109 mmol/L 106   Carbon Dioxide Latest Ref Range: 20 - 32 mmol/L 27   Urea Nitrogen Latest Ref Range: 7 - 30 mg/dL 18   Creatinine Latest Ref Range: 0.66 - 1.25 mg/dL 1.15   GFR Estimate Latest Ref Range: >60 mL/min/1.73_m2 64   GFR Estimate If Black Latest Ref Range: >60 mL/min/1.73_m2 74   Calcium Latest Ref Range: 8.5 - 10.1 mg/dL 9.0   Anion Gap Latest Ref Range: 3 - 14 mmol/L 3   Magnesium Latest Ref Range: 1.6 - 2.3 mg/dL 1.9   Albumin Latest Ref Range: 3.4 - 5.0 g/dL 3.3 (L)   Protein Total Latest Ref Range: 6.8 - 8.8 g/dL 6.9   Bilirubin Total Latest Ref Range: 0.2 - 1.3 mg/dL 0.3   Alkaline Phosphatase Latest Ref Range: 40 - 150 U/L 78   ALT Latest Ref Range: 0 - 70 U/L 39   AST Latest Ref Range: 0 - 45 U/L 26   Glucose Latest Ref Range: 70 - 99 mg/dL 114 (H)   WBC Latest Ref Range: 4.0 - 11.0 10e9/L 5.7    Hemoglobin Latest Ref Range: 13.3 - 17.7 g/dL 13.3   Hematocrit Latest Ref Range: 40.0 - 53.0 % 40.7   Platelet Count Latest Ref Range: 150 - 450 10e9/L 154   RBC Count Latest Ref Range: 4.4 - 5.9 10e12/L 4.80   MCV Latest Ref Range: 78 - 100 fl 85   MCH Latest Ref Range: 26.5 - 33.0 pg 27.7   MCHC Latest Ref Range: 31.5 - 36.5 g/dL 32.7   RDW Latest Ref Range: 10.0 - 15.0 % 14.0   Diff Method Unknown Automated Method   % Neutrophils Latest Units: % 76.4   % Lymphocytes Latest Units: % 10.5   % Monocytes Latest Units: % 10.4   % Eosinophils Latest Units: % 1.8   % Basophils Latest Units: % 0.5   % Immature Granulocytes Latest Units: % 0.4   Nucleated RBCs Latest Ref Range: 0 /100 0   Absolute Neutrophil Latest Ref Range: 1.6 - 8.3 10e9/L 4.4   Absolute Lymphocytes Latest Ref Range: 0.8 - 5.3 10e9/L 0.6 (L)   Absolute Monocytes Latest Ref Range: 0.0 - 1.3 10e9/L 0.6   Absolute Eosinophils Latest Ref Range: 0.0 - 0.7 10e9/L 0.1   Absolute Basophils Latest Ref Range: 0.0 - 0.2 10e9/L 0.0   Abs Immature Granulocytes Latest Ref Range: 0 - 0.4 10e9/L 0.0   Absolute Nucleated RBC Unknown 0.0       ASSESSMENT AND PLAN  SCC L tonsil, [id number], p16 +gardenia, ECS +gardenia: began chemotherapy 4/28/21 as a radiosensitizer to radiation with weekly cisplatin, recommended because of CKD with intermittent SURESH. Plan for about 6 doses concurrently with radiation. He is tolerating okay ex some nausea and increase in pain. Kidney function back to baseline. Proceed today.   --follow up weekly    CKD; baseline looks to be around 1.1: SURESH resolved, Cr better today. FENA and recheck last week supporing pre-renal    Nausea: using compazine TID and zofran prn. No ativan. Consider addition of zyprexa    Nutrition: weight stable (4/29 seems like outlier). Pain starting to limit PO intake     Acute treatment pain on chronic opioid use: has historically used hydrocodone-acetaminophen 4x daily at baseline for his sinuses, has had longstanding issues with  that and has tried many many interventions without success. We again reviewed that we will assist in managing his cancer treatment associated pain through his treatment.   --This last week he doubled his norco dosing from 5 tabs daily last week to 2 tabs q 4 hours. He is also taking Excedrin. We reviewed that with the amount of norco alone he is taking he is >4g tylenol which is above daily recommendation, addition of Excedrin is adding even more tylenol, plus NSAIDs which we recommend against. We discussed options and ultimately agreed to try something else with similar MME between now and Friday (palliative care visit), to see if it works, and then can make adjustments with Dr. Serrano pending next couple days.   --Starting 2-4 mg PO dilaudid every 4 hours prn, asked for first dose he start with 2mg but anticipate he will need/tolerate 4mg. We discussed the need to monittor for over-sedation and reiterated this is instead of norco and he should not be mixing or self increasing, he verbalized understanding. I will ask lena MIRANDACC to touch base with him tomorrow to see how the change is going.    --Recommended he take tylenol TID scheduled and stop Excedrin as it is too much acetaminophen and has NSAIDs  --start MMW prn; discussed short lived relief but particularly good for prior to eating or if needs acute breakthrough relief could try.   --Of note, I let Dr. Serrano know about the above plan and our discussion in preparation for their visit Friday.      Anxiety: Patient has tried a number of medications in the past. Referral placed to psychiatry after one of his initial visits but he cancelled this as he feels it is unnecessary. He is scheduled with palliative Friday and they may be able to offer management as well.   --He is currently on the maximum dose of Celexa at 40 mg daily. Upon chart review, it appears he has previously been on Abilify, buspirone, escitalopram, sertraline, and hydroxyzine.  --Also  uses alprazolam nightly along with zolpidem for sleep. Would rather avoid benzodiazepines to manage this. Avoiding lorazepam.   --consider zyprexa?    ASCVD:  See note by Dr. Ch, Cardiology, from 10/20/20. Confirmed he stopped lisinopril and metoprolol increased, but he cannot recall what dose. BP slightly high still today but anxiety could contribute. Monitor trend    Exercise: He can continue going to the gym as he tolerates.     COVID: Has been vaccinated.    Alcohol use: Recommend minimizing use through his cancer treatment.     90 minutes spent on the date of the encounter doing chart review, review of test results, interpretation of tests, patient visit, documentation, discussion with other provider(s) and discussion with family     Leisa Zarate CNP on 5/12/2021 at 10:40 AM

## 2021-05-12 ENCOUNTER — APPOINTMENT (OUTPATIENT)
Dept: LAB | Facility: CLINIC | Age: 71
End: 2021-05-12
Attending: INTERNAL MEDICINE
Payer: MEDICARE

## 2021-05-12 ENCOUNTER — INFUSION THERAPY VISIT (OUTPATIENT)
Dept: ONCOLOGY | Facility: CLINIC | Age: 71
End: 2021-05-12
Attending: INTERNAL MEDICINE
Payer: MEDICARE

## 2021-05-12 ENCOUNTER — HOSPITAL ENCOUNTER (EMERGENCY)
Facility: CLINIC | Age: 71
Discharge: HOME | End: 2021-05-13
Attending: NURSE PRACTITIONER | Admitting: NURSE PRACTITIONER
Payer: MEDICARE

## 2021-05-12 ENCOUNTER — APPOINTMENT (OUTPATIENT)
Dept: RADIATION ONCOLOGY | Facility: CLINIC | Age: 71
End: 2021-05-12
Attending: RADIOLOGY
Payer: MEDICARE

## 2021-05-12 ENCOUNTER — APPOINTMENT (OUTPATIENT)
Dept: GENERAL RADIOLOGY | Facility: CLINIC | Age: 71
End: 2021-05-12
Attending: NURSE PRACTITIONER
Payer: MEDICARE

## 2021-05-12 ENCOUNTER — TELEPHONE (OUTPATIENT)
Dept: ONCOLOGY | Facility: CLINIC | Age: 71
End: 2021-05-12

## 2021-05-12 VITALS
OXYGEN SATURATION: 96 % | BODY MASS INDEX: 33.47 KG/M2 | RESPIRATION RATE: 16 BRPM | DIASTOLIC BLOOD PRESSURE: 84 MMHG | HEART RATE: 70 BPM | TEMPERATURE: 98.2 F | SYSTOLIC BLOOD PRESSURE: 144 MMHG | WEIGHT: 223.4 LBS

## 2021-05-12 DIAGNOSIS — E83.42 HYPOMAGNESEMIA: ICD-10-CM

## 2021-05-12 DIAGNOSIS — G89.3 CANCER RELATED PAIN: ICD-10-CM

## 2021-05-12 DIAGNOSIS — F41.9 ANXIETY: ICD-10-CM

## 2021-05-12 DIAGNOSIS — N18.2 CKD (CHRONIC KIDNEY DISEASE) STAGE 2, GFR 60-89 ML/MIN: ICD-10-CM

## 2021-05-12 DIAGNOSIS — R50.9 FEVER AND CHILLS: ICD-10-CM

## 2021-05-12 DIAGNOSIS — C09.9 SQUAMOUS CELL CARCINOMA OF LEFT TONSIL (H): Primary | ICD-10-CM

## 2021-05-12 DIAGNOSIS — R94.4 ABNORMAL RENAL FUNCTION TEST: ICD-10-CM

## 2021-05-12 LAB
ALBUMIN SERPL-MCNC: 3.2 G/DL (ref 3.4–5)
ALBUMIN SERPL-MCNC: 3.3 G/DL (ref 3.4–5)
ALP SERPL-CCNC: 76 U/L (ref 40–150)
ALP SERPL-CCNC: 78 U/L (ref 40–150)
ALT SERPL W P-5'-P-CCNC: 35 U/L (ref 0–70)
ALT SERPL W P-5'-P-CCNC: 39 U/L (ref 0–70)
ANION GAP SERPL CALCULATED.3IONS-SCNC: 3 MMOL/L (ref 3–14)
ANION GAP SERPL CALCULATED.3IONS-SCNC: 6 MMOL/L (ref 3–14)
AST SERPL W P-5'-P-CCNC: 21 U/L (ref 0–45)
AST SERPL W P-5'-P-CCNC: 26 U/L (ref 0–45)
BASOPHILS # BLD AUTO: 0 10E9/L (ref 0–0.2)
BASOPHILS # BLD AUTO: 0 10E9/L (ref 0–0.2)
BASOPHILS NFR BLD AUTO: 0.1 %
BASOPHILS NFR BLD AUTO: 0.5 %
BILIRUB SERPL-MCNC: 0.3 MG/DL (ref 0.2–1.3)
BILIRUB SERPL-MCNC: 0.3 MG/DL (ref 0.2–1.3)
BUN SERPL-MCNC: 15 MG/DL (ref 7–30)
BUN SERPL-MCNC: 18 MG/DL (ref 7–30)
CALCIUM SERPL-MCNC: 8.6 MG/DL (ref 8.5–10.1)
CALCIUM SERPL-MCNC: 9 MG/DL (ref 8.5–10.1)
CHLORIDE SERPL-SCNC: 105 MMOL/L (ref 94–109)
CHLORIDE SERPL-SCNC: 106 MMOL/L (ref 94–109)
CO2 SERPL-SCNC: 25 MMOL/L (ref 20–32)
CO2 SERPL-SCNC: 27 MMOL/L (ref 20–32)
CREAT SERPL-MCNC: 1.15 MG/DL (ref 0.66–1.25)
CREAT SERPL-MCNC: 1.44 MG/DL (ref 0.66–1.25)
DIFFERENTIAL METHOD BLD: ABNORMAL
DIFFERENTIAL METHOD BLD: ABNORMAL
EOSINOPHIL # BLD AUTO: 0 10E9/L (ref 0–0.7)
EOSINOPHIL # BLD AUTO: 0.1 10E9/L (ref 0–0.7)
EOSINOPHIL NFR BLD AUTO: 0 %
EOSINOPHIL NFR BLD AUTO: 1.8 %
ERYTHROCYTE [DISTWIDTH] IN BLOOD BY AUTOMATED COUNT: 14 % (ref 10–15)
ERYTHROCYTE [DISTWIDTH] IN BLOOD BY AUTOMATED COUNT: 14.2 % (ref 10–15)
GFR SERPL CREATININE-BSD FRML MDRD: 49 ML/MIN/{1.73_M2}
GFR SERPL CREATININE-BSD FRML MDRD: 64 ML/MIN/{1.73_M2}
GLUCOSE SERPL-MCNC: 114 MG/DL (ref 70–99)
GLUCOSE SERPL-MCNC: 138 MG/DL (ref 70–99)
HCT VFR BLD AUTO: 37.1 % (ref 40–53)
HCT VFR BLD AUTO: 40.7 % (ref 40–53)
HGB BLD-MCNC: 12.3 G/DL (ref 13.3–17.7)
HGB BLD-MCNC: 13.3 G/DL (ref 13.3–17.7)
IMM GRANULOCYTES # BLD: 0 10E9/L (ref 0–0.4)
IMM GRANULOCYTES # BLD: 0 10E9/L (ref 0–0.4)
IMM GRANULOCYTES NFR BLD: 0.4 %
IMM GRANULOCYTES NFR BLD: 0.5 %
LABORATORY COMMENT REPORT: NORMAL
LACTATE BLD-SCNC: 1 MMOL/L (ref 0.7–2)
LYMPHOCYTES # BLD AUTO: 0 10E9/L (ref 0.8–5.3)
LYMPHOCYTES # BLD AUTO: 0.6 10E9/L (ref 0.8–5.3)
LYMPHOCYTES NFR BLD AUTO: 0.5 %
LYMPHOCYTES NFR BLD AUTO: 10.5 %
MAGNESIUM SERPL-MCNC: 1.9 MG/DL (ref 1.6–2.3)
MCH RBC QN AUTO: 27.7 PG (ref 26.5–33)
MCH RBC QN AUTO: 28.3 PG (ref 26.5–33)
MCHC RBC AUTO-ENTMCNC: 32.7 G/DL (ref 31.5–36.5)
MCHC RBC AUTO-ENTMCNC: 33.2 G/DL (ref 31.5–36.5)
MCV RBC AUTO: 85 FL (ref 78–100)
MCV RBC AUTO: 86 FL (ref 78–100)
MONOCYTES # BLD AUTO: 0.4 10E9/L (ref 0–1.3)
MONOCYTES # BLD AUTO: 0.6 10E9/L (ref 0–1.3)
MONOCYTES NFR BLD AUTO: 10.4 %
MONOCYTES NFR BLD AUTO: 4.8 %
NEUTROPHILS # BLD AUTO: 4.4 10E9/L (ref 1.6–8.3)
NEUTROPHILS # BLD AUTO: 7.5 10E9/L (ref 1.6–8.3)
NEUTROPHILS NFR BLD AUTO: 76.4 %
NEUTROPHILS NFR BLD AUTO: 94.1 %
NRBC # BLD AUTO: 0 10*3/UL
NRBC # BLD AUTO: 0 10*3/UL
NRBC BLD AUTO-RTO: 0 /100
NRBC BLD AUTO-RTO: 0 /100
PLATELET # BLD AUTO: 152 10E9/L (ref 150–450)
PLATELET # BLD AUTO: 154 10E9/L (ref 150–450)
POTASSIUM SERPL-SCNC: 3.9 MMOL/L (ref 3.4–5.3)
POTASSIUM SERPL-SCNC: 4.3 MMOL/L (ref 3.4–5.3)
PROT SERPL-MCNC: 6.9 G/DL (ref 6.8–8.8)
PROT SERPL-MCNC: 6.9 G/DL (ref 6.8–8.8)
RBC # BLD AUTO: 4.34 10E12/L (ref 4.4–5.9)
RBC # BLD AUTO: 4.8 10E12/L (ref 4.4–5.9)
SARS-COV-2 RNA RESP QL NAA+PROBE: NEGATIVE
SODIUM SERPL-SCNC: 136 MMOL/L (ref 133–144)
SODIUM SERPL-SCNC: 136 MMOL/L (ref 133–144)
SPECIMEN SOURCE: NORMAL
WBC # BLD AUTO: 5.7 10E9/L (ref 4–11)
WBC # BLD AUTO: 7.9 10E9/L (ref 4–11)

## 2021-05-12 PROCEDURE — C9803 HOPD COVID-19 SPEC COLLECT: HCPCS | Performed by: NURSE PRACTITIONER

## 2021-05-12 PROCEDURE — 258N000003 HC RX IP 258 OP 636: Performed by: NURSE PRACTITIONER

## 2021-05-12 PROCEDURE — 85025 COMPLETE CBC W/AUTO DIFF WBC: CPT | Performed by: INTERNAL MEDICINE

## 2021-05-12 PROCEDURE — 96367 TX/PROPH/DG ADDL SEQ IV INF: CPT

## 2021-05-12 PROCEDURE — 96374 THER/PROPH/DIAG INJ IV PUSH: CPT | Performed by: NURSE PRACTITIONER

## 2021-05-12 PROCEDURE — 96361 HYDRATE IV INFUSION ADD-ON: CPT | Performed by: NURSE PRACTITIONER

## 2021-05-12 PROCEDURE — 96375 TX/PRO/DX INJ NEW DRUG ADDON: CPT

## 2021-05-12 PROCEDURE — 71045 X-RAY EXAM CHEST 1 VIEW: CPT

## 2021-05-12 PROCEDURE — 85025 COMPLETE CBC W/AUTO DIFF WBC: CPT | Performed by: NURSE PRACTITIONER

## 2021-05-12 PROCEDURE — 99417 PROLNG OP E/M EACH 15 MIN: CPT | Performed by: NURSE PRACTITIONER

## 2021-05-12 PROCEDURE — 77386 HC IMRT TREATMENT DELIVERY, COMPLEX: CPT | Performed by: RADIOLOGY

## 2021-05-12 PROCEDURE — 80053 COMPREHEN METABOLIC PANEL: CPT | Performed by: INTERNAL MEDICINE

## 2021-05-12 PROCEDURE — 83605 ASSAY OF LACTIC ACID: CPT | Performed by: NURSE PRACTITIONER

## 2021-05-12 PROCEDURE — 96413 CHEMO IV INFUSION 1 HR: CPT

## 2021-05-12 PROCEDURE — 250N000011 HC RX IP 250 OP 636: Performed by: NURSE PRACTITIONER

## 2021-05-12 PROCEDURE — G0463 HOSPITAL OUTPT CLINIC VISIT: HCPCS

## 2021-05-12 PROCEDURE — 80053 COMPREHEN METABOLIC PANEL: CPT | Performed by: NURSE PRACTITIONER

## 2021-05-12 PROCEDURE — 83735 ASSAY OF MAGNESIUM: CPT | Performed by: INTERNAL MEDICINE

## 2021-05-12 PROCEDURE — 99285 EMERGENCY DEPT VISIT HI MDM: CPT | Performed by: NURSE PRACTITIONER

## 2021-05-12 PROCEDURE — 87635 SARS-COV-2 COVID-19 AMP PRB: CPT | Performed by: NURSE PRACTITIONER

## 2021-05-12 PROCEDURE — 99215 OFFICE O/P EST HI 40 MIN: CPT | Performed by: NURSE PRACTITIONER

## 2021-05-12 PROCEDURE — 87040 BLOOD CULTURE FOR BACTERIA: CPT | Performed by: NURSE PRACTITIONER

## 2021-05-12 PROCEDURE — 99285 EMERGENCY DEPT VISIT HI MDM: CPT | Mod: 25 | Performed by: NURSE PRACTITIONER

## 2021-05-12 RX ORDER — SODIUM CHLORIDE 9 MG/ML
1000 INJECTION, SOLUTION INTRAVENOUS CONTINUOUS PRN
Status: CANCELLED
Start: 2021-05-12

## 2021-05-12 RX ORDER — MEPERIDINE HYDROCHLORIDE 25 MG/ML
25 INJECTION INTRAMUSCULAR; INTRAVENOUS; SUBCUTANEOUS EVERY 30 MIN PRN
Status: CANCELLED | OUTPATIENT
Start: 2021-05-12

## 2021-05-12 RX ORDER — METHYLPREDNISOLONE SODIUM SUCCINATE 125 MG/2ML
125 INJECTION, POWDER, LYOPHILIZED, FOR SOLUTION INTRAMUSCULAR; INTRAVENOUS
Status: CANCELLED
Start: 2021-05-12

## 2021-05-12 RX ORDER — PALONOSETRON 0.05 MG/ML
0.25 INJECTION, SOLUTION INTRAVENOUS ONCE
Status: CANCELLED
Start: 2021-05-12

## 2021-05-12 RX ORDER — LORAZEPAM 2 MG/ML
0.5 INJECTION INTRAMUSCULAR EVERY 4 HOURS PRN
Status: CANCELLED
Start: 2021-05-12

## 2021-05-12 RX ORDER — ALBUTEROL SULFATE 90 UG/1
1-2 AEROSOL, METERED RESPIRATORY (INHALATION)
Status: CANCELLED
Start: 2021-05-12

## 2021-05-12 RX ORDER — NALOXONE HYDROCHLORIDE 0.4 MG/ML
.1-.4 INJECTION, SOLUTION INTRAMUSCULAR; INTRAVENOUS; SUBCUTANEOUS
Status: CANCELLED | OUTPATIENT
Start: 2021-05-12

## 2021-05-12 RX ORDER — ALBUTEROL SULFATE 0.83 MG/ML
2.5 SOLUTION RESPIRATORY (INHALATION)
Status: CANCELLED | OUTPATIENT
Start: 2021-05-12

## 2021-05-12 RX ORDER — EPINEPHRINE 1 MG/ML
0.3 INJECTION, SOLUTION INTRAMUSCULAR; SUBCUTANEOUS EVERY 5 MIN PRN
Status: CANCELLED | OUTPATIENT
Start: 2021-05-12

## 2021-05-12 RX ORDER — DIPHENHYDRAMINE HYDROCHLORIDE 50 MG/ML
50 INJECTION INTRAMUSCULAR; INTRAVENOUS
Status: CANCELLED
Start: 2021-05-12

## 2021-05-12 RX ORDER — HEPARIN SODIUM (PORCINE) LOCK FLUSH IV SOLN 100 UNIT/ML 100 UNIT/ML
5 SOLUTION INTRAVENOUS
Status: CANCELLED | OUTPATIENT
Start: 2021-05-12

## 2021-05-12 RX ORDER — HYDROMORPHONE HYDROCHLORIDE 1 MG/ML
0.5 INJECTION, SOLUTION INTRAMUSCULAR; INTRAVENOUS; SUBCUTANEOUS ONCE
Status: COMPLETED | OUTPATIENT
Start: 2021-05-12 | End: 2021-05-12

## 2021-05-12 RX ORDER — HEPARIN SODIUM,PORCINE 10 UNIT/ML
5 VIAL (ML) INTRAVENOUS
Status: CANCELLED | OUTPATIENT
Start: 2021-05-12

## 2021-05-12 RX ORDER — HYDROMORPHONE HYDROCHLORIDE 2 MG/1
2-4 TABLET ORAL EVERY 4 HOURS PRN
Qty: 36 TABLET | Refills: 0 | Status: SHIPPED | OUTPATIENT
Start: 2021-05-12 | End: 2021-05-19

## 2021-05-12 RX ORDER — PALONOSETRON 0.05 MG/ML
0.25 INJECTION, SOLUTION INTRAVENOUS ONCE
Status: COMPLETED | OUTPATIENT
Start: 2021-05-12 | End: 2021-05-12

## 2021-05-12 RX ADMIN — SODIUM CHLORIDE 1000 ML: 9 INJECTION, SOLUTION INTRAVENOUS at 22:47

## 2021-05-12 RX ADMIN — MAGNESIUM SULFATE HEPTAHYDRATE: 500 INJECTION, SOLUTION INTRAMUSCULAR; INTRAVENOUS at 10:24

## 2021-05-12 RX ADMIN — DEXAMETHASONE SODIUM PHOSPHATE: 10 INJECTION, SOLUTION INTRAMUSCULAR; INTRAVENOUS at 09:58

## 2021-05-12 RX ADMIN — CISPLATIN 90 MG: 1 INJECTION, SOLUTION INTRAVENOUS at 11:01

## 2021-05-12 RX ADMIN — HYDROMORPHONE HYDROCHLORIDE 0.5 MG: 1 INJECTION, SOLUTION INTRAMUSCULAR; INTRAVENOUS; SUBCUTANEOUS at 23:07

## 2021-05-12 RX ADMIN — PALONOSETRON HYDROCHLORIDE 0.25 MG: 0.25 INJECTION, SOLUTION INTRAVENOUS at 09:42

## 2021-05-12 ASSESSMENT — PAIN SCALES - GENERAL: PAINLEVEL: NO PAIN (0)

## 2021-05-12 NOTE — TELEPHONE ENCOUNTER
PA Initiation    Medication: Hydromorphone - Submitted  Insurance Company: [employer] - Phone [phone] Fax [phone]  Pharmacy Filling the Rx:    Filling Pharmacy Phone:    Filling Pharmacy Fax:    Start Date: 5/12/2021        Samantha Francois CPhT  [facility]  Oncology Pharmacy Liaison  [email or web address]  Phone: [phone]  Fax: [phone]

## 2021-05-12 NOTE — Clinical Note
5/12/2021         RE: Quan Murphy  [address] [facility] [address]        Dear Colleague,    Thank you for referring your patient, Quan Murphy, to the [facility] CANCER CLINIC. Please see a copy of my visit note below.    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 Christensen). Path: SCC, non keratinizing, p16 +gardenia  3/23/21 CT neck. 2.9 x 2.7 x 3.5 cm L tonsil fullness involving soft palate, level 2-3 neck nodes  4/2/21 PET/CT. 2.7 x 2.2 x 2.7 cm L palatine tonsil mass (SUV 24), extension to oral cavity, 3.4 x 2.0 cm L level 4 node invading SCM, 1.8 x 1.8 cm L level 2A/3 node (SUV 7.9) w/ECS, 1.4 x 0.9 cm R level 2A node (SUV 6.7)    Concurrent chemoradiation with weekly cisplatin began 4/28/21    SUBJECTIVE              New is feeling okay today. He notes some intermittent nausea so is taking compazine 3x per day along with zofran as needed. His bowels are moving, he takes stool softeners as needed.     Perhaps has low buzz in his ears but did not really notice until I asked about it. As far as neuropathy, he notes soft tingling in his fingertips. He has still been able to play his guitar which he enjoys.     Throat is uncomfortable when he does swallowing exercises but overall pain is controlled with prn norco q4 hours, he takes 5 total throughout the day . He has no limitation of eating right now but they have purchased high protein powder in anticipation. Feels he is staying well hydrated, drinking at least 8 glasses of  water per day. No dizziness or urinary concerns.     He stopped his lisinopril and increased his metoprolol but cannot remember to what dose.    He denies other concerns.    REVIEW OF SYSTEMS  Patient denies any of the following except if noted above: fevers, chills, difficulty with energy, vision or hearing changes, chest pain, dyspnea, abdominal pain, nausea, diarrhea, constipation, new urinary concerns, headaches, numbness, or tingling.     CURRENT OUTPATIENT MEDICATIONS  Current Outpatient Medications   Medication Sig Dispense Refill     ALPRAZolam (XANAX) 0.5 MG tablet TAKE 1 TABLET (0.5 MG) BY MOUTH 4 TIMES DAILY AS NEEDED FOR ANXIETY 120 tablet 1     ASPIRIN ADULT LOW STRENGTH 81 MG EC tablet TAKE ONE TABLET BY MOUTH ONCE DAILY 90 tablet 1     citalopram (CELEXA) 40 MG tablet TAKE ONE TABLET BY MOUTH ONCE DAILY 30 tablet 8     HYDROcodone-acetaminophen (NORCO) 7.5-325 MG per tablet Take 1 tablet by mouth every 4 hours as needed for moderate to severe pain maximum 5 tablet(s) per day 150 tablet 0     hydrOXYzine (VISTARIL) 25 MG capsule TAKE 1 CAPSULE (25 MG) BY MOUTH 4 TIMES DAILY AS NEEDED FOR ANXIETY 120 capsule 3     metoprolol succinate ER (TOPROL-XL) 50 MG 24 hr tablet Take 1 tablet (50 mg) by mouth daily 90 tablet 3     nitroGLYcerin (NITROSTAT) 0.4 MG sublingual tablet For chest pain place 1 tablet under the tongue every 5 minutes for 3 doses. If symptoms persist 5 minutes after 1st dose call 911. 25 tablet 0     omeprazole (PRILOSEC) 40 MG DR capsule Take 1 capsule (40 mg) by mouth daily 90 capsule 3     ondansetron (ZOFRAN-ODT) 8 MG ODT tab Take 1 tablet (8 mg) by mouth every 8 hours as needed for nausea 30 tablet 11     oxyCODONE (ROXICODONE) 5 MG tablet Take 1 tablet (5 mg) by mouth every 6 hours as needed for pain 12 tablet 0     prochlorperazine (COMPAZINE) 10 MG tablet Take 0.5 tablets (5 mg) by mouth every 6 hours as needed (Nausea/Vomiting) 30 tablet 11     rosuvastatin (CRESTOR) 40 MG tablet  Take 1 tablet (40 mg) by mouth daily 90 tablet 1     testosterone (ANDROGEL 1.62 % PUMP) 20.25 MG/ACT gel testosterone 20.25 mg/1.25 gram (1.62 %) transdermal gel pump       triamcinolone (KENALOG) 0.1 % external cream        zolpidem (AMBIEN) 10 MG tablet TAKE ONE TABLET BY MOUTH EVERY EVENING AS NEEDED FOR SLEEP 30 tablet 5     PHYSICAL EXAM  General: The patient is a pleasant male in no acute distress.  There were no vitals taken for this visit.  Wt Readings from Last 10 Encounters:   05/06/21 103.4 kg (228 lb)   05/05/21 102.2 kg (225 lb 4.8 oz)   04/29/21 105.7 kg (233 lb)   04/28/21 102.8 kg (226 lb 9.6 oz)   04/02/21 102 kg (224 lb 13.9 oz)   03/22/21 101.1 kg (222 lb 12.5 oz)   03/04/21 98.9 kg (218 lb)   10/20/20 99 kg (218 lb 4.8 oz)   07/15/20 99.3 kg (219 lb)   02/18/20 98.1 kg (216 lb 4.8 oz)   HEENT: EOMI, PERRL. Sclerae are anicteric. Oral mucosa is pink and moist. Left tonsil with ulcerative lesion.   Lymph: Neck is supple with Level 3 left node about 2 cm in size palpable in the neck. level 2 node palpable in the neck about 2 cm in size, no other lymphadenopathy in the cervical or supraclavicular areas.   Heart: Regular rate and rhythm.   Lungs: Clear to auscultation bilaterally.   Abdomen: Bowel sounds present, soft, nontender with no palpable hepatosplenomegaly or masses.   Extremities: No lower extremity edema noted bilaterally.   Neuro: Cranial nerves II through XII are grossly intact.  Skin: No rashes, petechiae, or bruising noted on exposed skin.    LABORATORY AND IMAGING STUDIES        ASSESSMENT AND PLAN  SCC L tonsil, [id number], p16 +gardenia, ECS +gardenia: began chemotherapy 4/28/21 as a radiosensitizer to radiation with weekly cisplatin, recommended because of CKD with intermittent SURESH. Plan for about 6 doses concurrently with radiation. He tolerated first week okay ex some nausea and kidney injury. Cr Cl within limits to proceed with same dose. Will update Dr. Schrader and see what her threshold is  to switch to carbo.   --follow up weekly    SURESH on CKD; baseline looks to be around 1.1: 1.35 today. BUN wnl and he feels well hydrated so likely mostly 2/2 cisplatin. Getting about 1.5 L total fluid today with treatment and he will continue to push PO liquid ~80 oz daily.   --checking FENA and UA  --recheck Creat after fluids  --consider switch to carbo pending trend    Nausea: using compazine and zofran prn. No ativan    Nutrition: weight stable (4/29 seems like outlier). No limit on PO intake yet.     Anxiety: evident during visit. Patient has tried a number of medications in the past. Referral placed to psychiatry last week, watch for appt to be scheduled. He is also scheduled with palliative next Friday and then could offer management as well. He is on celexa 40 mg daily. He is currently on the maximum dose of Celexa at 40 mg daily. Upon chart review, it appears he has previously been on Abilify, buspirone, escitalopram, sertraline, and hydroxyzine.    Anticipated acute on chronic opioid use: Uses hydrocodone-acetaminophen 4x daily at baseline for his sinuses, has had longstanding issues with that and has tried many many interventions without success. We again reviewed that we will assist in managing his cancer treatment associated pain through his treatment. He is also set up to meet with Dr. Serrano on 5/14.   --he states that his anxiety will go down once he knows we will refill medication for him. I reassured him of this.   --now using 5 norco per day for pain control/throat pain. This is controlled today.     Anxiety: Uses alprazolam nightly along with zolpidem. Would rather avoid benzodiazepines to manage this. Avoid lorazepam. Psychiatry as above.     ASCVD:  See note by Dr. Ch, Cardiology, from 10/20/20. Confirmed he stopped lisinopril and metoprolol increased, but he cannot recall what dose. BP slightly high still today but anxiety could contribute. Monitor trend    Exercise: He can continue going  to the gym as he tolerates.     COVID: Has been vaccinated.    Alcohol use: Recommend minimizing use through his cancer treatment.               Again, thank you for allowing me to participate in the care of your patient.        Sincerely,        Leisa Zarate, CNP

## 2021-05-12 NOTE — NURSING NOTE
"Oncology Rooming Note    May 12, 2021 8:10 AM   Quan Murphy is a 70 year old male who presents for:    Chief Complaint   Patient presents with     Oncology Clinic Visit     RETURN; SQUAMOUS CELL CARCINOMA OF TONSIL     Blood Draw     labs drawn with piv start by rn.  vs taken     Initial Vitals: BP (!) 144/84 (BP Location: Right arm, Patient Position: Sitting, Cuff Size: Adult Large)   Pulse 70   Temp 98.2  F (36.8  C) (Tympanic)   Resp 16   Wt 101.3 kg (223 lb 6.4 oz)   SpO2 96%   BMI 33.47 kg/m   Estimated body mass index is 33.47 kg/m  as calculated from the following:    Height as of 4/28/21: 1.74 m (5' 8.5\").    Weight as of this encounter: 101.3 kg (223 lb 6.4 oz). Body surface area is 2.21 meters squared.  No Pain (0) Comment: Data Unavailable   No LMP for male patient.  Allergies reviewed: Yes  Medications reviewed: Yes    Medications: Medication refills not needed today.  Pharmacy name entered into EPIC:    [facility] [place], [place] - [address] [place] [address] [address] 2019 - [place], [place] - [address] [facility] [place], [place] - [address] [place] [address] [id number]    Clinical concerns: No new concerns.        Mary Jane Michelle CMA              "

## 2021-05-12 NOTE — TELEPHONE ENCOUNTER
Prior Authorization Approval    Authorization Effective Date: 5/12/2021  Authorization Expiration Date: 5/12/2022  Medication: Hydromorphone - APPROVED  Approved Dose/Quantity:   Reference #:     Insurance Company: [employer] - Phone [phone] Fax [phone]  Expected CoPay:       CoPay Card Available:      Foundation Assistance Needed:    Which Pharmacy is filling the prescription (Not needed for infusion/clinic administered):    Pharmacy Notified:    Patient Notified:          Samantha Francois CPhT  [facility]  Oncology Pharmacy Liaison  [email or web address]  Phone: [phone]  Fax: [phone]

## 2021-05-12 NOTE — NURSING NOTE
Chief Complaint   Patient presents with     Oncology Clinic Visit     RETURN; SQUAMOUS CELL CARCINOMA OF TONSIL     Blood Draw     labs drawn with piv start by rn.  vs taken     Labs drawn with PIV start by rn.  Pt tolerated well.  VS taken and pt checked in for next appt.    Alia Contreras RN

## 2021-05-12 NOTE — PROGRESS NOTES
Infusion Nursing Note:  Quan Murphy presents today for C1 D15 Cisplatin.    Patient seen by provider today: Yes: Leisa Zarate NP   present during visit today: Not Applicable.    Note: Patient arrives feeling well. No further complaints or concerns after RIVERA visit. Stated he had some nausea and didn't feel well a couple days after his first infusion, but side effects were manageable with PRN antiemetics.    Intravenous Access:  Peripheral IV placed.    Treatment Conditions:  Lab Results   Component Value Date    HGB 13.3 05/12/2021     Lab Results   Component Value Date    WBC 5.7 05/12/2021      Lab Results   Component Value Date    ANEU 4.4 05/12/2021     Lab Results   Component Value Date     05/12/2021      Lab Results   Component Value Date     05/12/2021                   Lab Results   Component Value Date    POTASSIUM 3.9 05/12/2021           Lab Results   Component Value Date    MAG 1.9 05/12/2021            Lab Results   Component Value Date    CR 1.15 05/12/2021                   Lab Results   Component Value Date    LATRELL 9.0 05/12/2021                Lab Results   Component Value Date    BILITOTAL 0.3 05/12/2021           Lab Results   Component Value Date    ALBUMIN 3.3 05/12/2021                    Lab Results   Component Value Date    ALT 39 05/12/2021           Lab Results   Component Value Date    AST 26 05/12/2021       Results reviewed, labs MET treatment parameters, ok to proceed with treatment.      Post Infusion Assessment:  Patient tolerated infusion without incident.  Patient voided pre, during and post Cisplatin.  Blood return noted pre and post infusion.  Site patent and intact, free from redness, edema or discomfort.  No evidence of extravasations.  Access discontinued per protocol.       Discharge Plan:   Prescription refills given for MMW and Dilaudid.  Discharge instructions reviewed with: Patient.  Patient and/or family verbalized understanding of discharge  instructions and all questions answered.  AVS to patient via [employer].  Patient will return 5/19 for next appointment.   Patient discharged in stable condition accompanied by: self.  Departure Mode: Ambulatory.    Beatrice Hicks RN

## 2021-05-13 ENCOUNTER — TELEPHONE (OUTPATIENT)
Dept: ONCOLOGY | Facility: CLINIC | Age: 71
End: 2021-05-13

## 2021-05-13 ENCOUNTER — OFFICE VISIT (OUTPATIENT)
Dept: RADIATION ONCOLOGY | Facility: CLINIC | Age: 71
End: 2021-05-13
Attending: RADIOLOGY
Payer: MEDICARE

## 2021-05-13 VITALS
BODY MASS INDEX: 34.01 KG/M2 | WEIGHT: 227 LBS | HEART RATE: 76 BPM | SYSTOLIC BLOOD PRESSURE: 150 MMHG | DIASTOLIC BLOOD PRESSURE: 87 MMHG

## 2021-05-13 VITALS
RESPIRATION RATE: 18 BRPM | DIASTOLIC BLOOD PRESSURE: 99 MMHG | OXYGEN SATURATION: 96 % | HEART RATE: 101 BPM | TEMPERATURE: 100.1 F | SYSTOLIC BLOOD PRESSURE: 161 MMHG

## 2021-05-13 DIAGNOSIS — C09.9 SQUAMOUS CELL CARCINOMA OF LEFT TONSIL (H): Primary | ICD-10-CM

## 2021-05-13 LAB
ALBUMIN UR-MCNC: 30 MG/DL
APPEARANCE UR: CLEAR
BILIRUB UR QL STRIP: NEGATIVE
COLOR UR AUTO: YELLOW
GLUCOSE UR STRIP-MCNC: 50 MG/DL
HGB UR QL STRIP: NEGATIVE
KETONES UR STRIP-MCNC: NEGATIVE MG/DL
LEUKOCYTE ESTERASE UR QL STRIP: NEGATIVE
MUCOUS THREADS #/AREA URNS LPF: PRESENT /LPF
NITRATE UR QL: NEGATIVE
PH UR STRIP: 5 PH (ref 5–7)
RBC #/AREA URNS AUTO: <1 /HPF (ref 0–2)
SOURCE: ABNORMAL
SP GR UR STRIP: 1.03 (ref 1–1.03)
UROBILINOGEN UR STRIP-MCNC: 0 MG/DL (ref 0–2)
WBC #/AREA URNS AUTO: 1 /HPF (ref 0–5)

## 2021-05-13 PROCEDURE — 77386 HC IMRT TREATMENT DELIVERY, COMPLEX: CPT | Performed by: RADIOLOGY

## 2021-05-13 PROCEDURE — 81001 URINALYSIS AUTO W/SCOPE: CPT | Performed by: NURSE PRACTITIONER

## 2021-05-13 ASSESSMENT — ENCOUNTER SYMPTOMS
COUGH: 0
SORE THROAT: 1
APPETITE CHANGE: 0
CHILLS: 1
MUSCULOSKELETAL NEGATIVE: 1
CONSTIPATION: 0
NEUROLOGICAL NEGATIVE: 1
FEVER: 1
NAUSEA: 1
ABDOMINAL PAIN: 0
VOMITING: 0
DIARRHEA: 0
SHORTNESS OF BREATH: 0

## 2021-05-13 NOTE — PROGRESS NOTES
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 Cycle 1, day 1 of weekly cisplatin.    2021: Weekly RT visit. Current dose of 424 cGy. Tolerating treatment well. No issues.    2021: Cycle 1, day 8 of weekly cisplatin.    2021: Weekly RT visit. Current dose of 1484 cGy. Tolerating treatment well.    2021: Cycle 1, day 15 of weekly cisplatin    2021: Weekly RT visit. Current dose of 2544 cGy. Moderate oropharyngeal pain.    ED visits/Hospitalizations:  1. 2021: Presentation to ED with low-grade fevers (99.6-100.1  F) and tachycardia to the low 100-120s. Work-up negative. Symptoms improved with IV fluid support and pain medication.    Missed Treatments:  None    Subjective: Mr. Murphy presents to clinic today for his weekly on-treatment visit. He describes moderate oropharyngeal pain which he rates as a 7/10 in severity. He has been taking acetaminophen and viscous lidocaine and was recently started on antibiotic by the medical oncology service. He is eating a soft diet without any reported dysphagia and his weight has remained stable over the past week.    ROS:   Constitutional  Pain (0-10): 7 (mouth), 7 (throat), 3 (skin)  Fatigue: Moderate    CNS  Headaches: Mild    ENT  Mucositis: Moderate    Cardiopulmonary  Dyspnea: None    GI  Nausea/vomiting: Mild nausea without vomiting    Nutrition  PEG: No  Diet: Soft/puréed    Integumentary  Dermatitis: Mild    Objective:   Current weight: 103.0 kg  Last week's weight: 103.4  kg  Starting weight: 105.7 kg    BP: 150/87 (sitting), 161/89 (standing)  Pulse: 76 (sitting), 80 (standing)     General: Healthy-appearing 70-year-old gentleman seated comfortably in an examination chair in no acute distress  HEENT: NC/AT.  EOMI.  No rhinorrhea or epistaxis.  Mildly dry mucous membranes.  Patchy mucositis involving the left lateral oral tongue, left posterior buccal mucosa, left retromolar trigone and left tonsillar fossa.  No oral thrush.  Pulmonary: No wheezing, stridor or respiratory distress   Skin: Mild erythema of the left lower face and bilateral neck.  No desquamation    Treatment-related toxicities (CTCAE v5.0):    Mucositis: Grade 2    Dermatitis: Grade 1    Assessment:    Mr. 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 He is receiving curative-intent concurrent chemoradiation with weekly cisplatin. He is developing progressive treatment-induced mucositis and dermatitis.    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:    Continue chemoradiotherapy    Pain management:    Continue acetaminophen and viscous lidocaine as needed for mild to moderate pain    Continue Dilaudid as prescribed for moderate to severe breakthrough symptoms    Follow-up with palliative care as scheduled on 5/14/2021     Fluids/Electrolytes/Nutrition:    Continue diet as tolerated with caloric goals as recommended by the oncology dietitian    Dermatitis:    Continue twice daily Aquaphor use to the lower face bilateral neck    Mucositis:    Pain control as described above    Continue frequent salt/soda rinses    Anxiety:    Continue Celexa    Follow-up with mental health consult (currently pending scheduling)    Mosaiq chart and setup information reviewed  IGRT images reviewed    Medication list reviewed    Ahmet Robbins MD/PhD    Dept of Radiation Oncology  [facility]

## 2021-05-13 NOTE — DISCHARGE INSTRUCTIONS
Continue to use Tylenol 650 mg every 4 - 6 hours as needed.  Drink plenty of fluids.  Follow-up tomorrow as planned with oncology. You should have kidney function labs rechecked.

## 2021-05-13 NOTE — ED TRIAGE NOTES
Patient been getting chemo but is having fevers. Patient has had both covid shots. Patient in third week of treatment.

## 2021-05-13 NOTE — ED PROVIDER NOTES
History     Chief Complaint   Patient presents with     Fever     HPI  Quan Murphy is a 70 year old male with history of squamous cell carcinoma of the left tonsil (on chemo and radiation), Htn, hyperlipidemia and prostate cancer who presents to the emergency department for evaluation of fever. Patient had chemo and radiation today (first treatment was 4/282/2021). Labs prior to chemo were unremarkable. Patient spiked a fever up to 102 at home today.  Denies cough or congestion. Denies chest pain or shortness of breath. Denies abdominal pain. Denies dysuria. He has nausea since being on chemo but states well controlled with home medications. Patient tells me he his pain medication was changed today from taking tylenol and Norco to taking Dilaudid. Patient thinks his fever is related to stopping taking Tylenol. Patient was instructed to go the ED if he has fevers >101. When his fever developed this evening he took tylenol and presented here. Temp now is 99.6.     Allergies:  Allergies   Allergen Reactions     Animal Dander      Azithromycin Nausea and Vomiting     Dust Mites      Pollen Extract      Smoke.        Problem List:    Patient Active Problem List    Diagnosis Date Noted     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     Priority: Medium     Check 9.21 for f/u Fujioka       Hypomagnesemia 04/13/2021     Priority: Medium     Hiatal hernia 02/17/2020     Priority: Medium     Renal hematoma 10/28/2017     Priority: Medium     Retroperitoneal hematoma 10/28/2017     Priority: Medium     Syncope 10/18/2017     Priority: Medium     S/P ORIF (open reduction internal fixation) fracture 08/03/2017     Priority: Medium     Closed Colles' fracture of right radius with routine healing, subsequent encounter 08/03/2017     Priority: Medium     Status post insertion of drug-eluting stent into left anterior descending artery for coronary artery disease 01/05/2017     Priority: Medium     Chest pain  12/24/2016     Priority: Medium     Arthropathy 02/04/2014     Priority: Medium     Problem list name updated by automated process. Provider to review       Coronary atherosclerosis      Priority: Medium     Coronary artery disease  Problem list name updated by automated process. Provider to review       Hallux rigidus 07/16/2013     Priority: Medium     Inguinal hernia 06/28/2013     Priority: Medium     Degenerative arthritis of foot 06/28/2013     Priority: Medium     Advanced directives, counseling/discussion 05/24/2013     Priority: Medium     Advance Care Planning:   Receipt of ACP document:  Received: Health Care Directive which was witnessed or notarized on 8-18-08.  Document previously scanned on 7-8-13.  Validation form completed and sent to be scanned.  Code Status needs to be updated to reflect choices in most recent ACP document. Confirmed/documented designated decision maker(s). See permanent comments section of demographics in clinical tab. View document(s) and details by clicking on code status.   Added by Vandana Platt RN, System ACP Coordinator on 7/22/2013.    Advance Care Planning:   ACP Review and Resources Provided:  Reviewed chart for advance care plan.  Quan ALFREDA Murphy has no plan or code status on file however states presence of ACP document. Copy requested. Confirmed code status reflects current choices pending receipt of document/advance care plan review. Confirmed/documented designated decision maker(s). See permanent comments section of demographics in clinical tab.   Added by Krysten Jasmine on 5/24/2013             Hypertension goal BP (blood pressure) < 140/90 06/12/2012     Priority: Medium     Hyperlipidemia LDL goal <130 12/22/2011     Priority: Medium     Anxiety 11/02/2011     Priority: Medium     Allergic conjunctivitis 07/08/2008     Priority: Medium     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, insomnia type 11/17/2006     Priority: Medium     Problem list name updated by  automated process. Provider to review       Acute reaction to stress 01/12/2005     Priority: Medium     Problem list name updated by automated process. Provider to review       Chronic maxillary sinusitis 01/12/2005     Priority: Medium     Contracture of palmar fascia 01/12/2005     Priority: Medium     Benign neoplasm of skin of other and unspecified parts of face 01/12/2005     Priority: Medium     Malignant neoplasm of prostate (H) 11/26/2004     Priority: Medium        Past Medical History:    Past Medical History:   Diagnosis Date     Allergy, unspecified not elsewhere classified      Antiplatelet or antithrombotic long-term use      Anxiety      Arthritis      Chest pain      Chronic sinusitis      Coronary atherosclerosis of unspecified type of vessel, native or graft      Hyperlipidemia      Hypertension      Inguinal hernia      Kidney stones      Malignant neoplasm of prostate (H)      Prostate cancer (H)        Past Surgical History:    Past Surgical History:   Procedure Laterality Date     ARTHRODESIS FOOT  7/23/2013    Procedure: ARTHRODESIS FOOT;  Great Toe Arthrodesis Left Foot;  Surgeon: Ash Gonzalez DPM;  Location: [facility] OR     ARTHRODESIS FOOT  6/10/2014    Procedure: ARTHRODESIS FOOT;  Surgeon: Ash Gonzalez DPM;  Location:  OR     C LAPAROSCOPY, SURGICAL PROSTATECTOMY, RETROPUBIC RADICAL, W/NERVE SPARING  11/30/2004    [facility]     C TOTAL KNEE ARTHROPLASTY  05/01/08    Left knee     COLONOSCOPY  10/7/2013    Procedure: COLONOSCOPY;  Colonoscopy;  Surgeon: Mike Fallon MD;  Location:  GI     ESOPHAGOSCOPY, GASTROSCOPY, DUODENOSCOPY (EGD), COMBINED N/A 2/12/2020    Procedure: ESOPHAGOGASTRODUODENOSCOPY (EGD);  Surgeon: Sam Escobar MD;  Location:  GI     EXTRACORPOREAL SHOCK WAVE LITHOTRIPSY (ESWL) Bilateral 10/18/2017    Procedure: EXTRACORPOREAL SHOCK WAVE LITHOTRIPSY (ESWL);  BILATERAL EXTRACORPOREAL SHOCKWAVE LITHOTRIPSY ;   Surgeon: Meir Torres MD;  Location: [facility] OR     HC CORRECT BUNION,SIMPLE  08/11/2005    x3     HC REMV TOE BENIGN BONE LESN  08/11/2005     HERNIORRHAPHY INGUINAL  7/3/2013    Procedure: HERNIORRHAPHY INGUINAL;  Open Repair Inguinal hernia Right with mesh ;  Surgeon: Sam Escobar MD;  Location: [facility] OR     MOHS MICROGRAPHIC PROCEDURE  08/23/11    ear and chin-[facility]     OPEN REDUCTION INTERNAL FIXATION WRIST Right 7/18/2017    Procedure: OPEN REDUCTION INTERNAL FIXATION WRIST;  Right distal radius open reduction and internal fixation;  Surgeon: Pedro Blanca DO;  Location: [facility] OR     RECONSTRUCT FOREFOOT WITH METATARSOPHALANGEAL (MTP) FUSION  6/10/2014    Procedure: RECONSTRUCT FOREFOOT WITH METATARSOPHALANGEAL (MTP) FUSION;  Surgeon: Ash Gonzalez DPM;  Location: [facility] OR     STENT, CORONARY, DEMI       SURGICAL HISTORY OF -   1999/1974    lt knee     SURGICAL HISTORY OF -   10/2004    lithotripsy     SURGICAL HISTORY OF - 11/05    angiogram with stent       Family History:    Family History   Problem Relation Age of Onset     Hypertension Father         has had MI      Connective Tissue Disorder Mother         LUPUS     Heart Disease Mother         poor valve-needing replacement       Social History:  Marital Status:   [2]  Social History     Tobacco Use     Smoking status: Never Smoker     Smokeless tobacco: Never Used   Substance Use Topics     Alcohol use: Yes     Alcohol/week: 8.3 standard drinks     Types: 10 Cans of beer per week     Comment: 2 beers a day      Drug use: No        Medications:    ALPRAZolam (XANAX) 0.5 MG tablet  ASPIRIN ADULT LOW STRENGTH 81 MG EC tablet  citalopram (CELEXA) 40 MG tablet  HYDROmorphone (DILAUDID) 2 MG tablet  hydrOXYzine (VISTARIL) 25 MG capsule  magic mouthwash (ENTER INGREDIENTS IN COMMENTS) suspension  metoprolol succinate ER (TOPROL-XL) 50 MG 24 hr tablet  nitroGLYcerin (NITROSTAT) 0.4 MG sublingual tablet  omeprazole  (PRILOSEC) 40 MG DR capsule  ondansetron (ZOFRAN-ODT) 8 MG ODT tab  prochlorperazine (COMPAZINE) 10 MG tablet  rosuvastatin (CRESTOR) 40 MG tablet  testosterone (ANDROGEL 1.62 % PUMP) 20.25 MG/ACT gel  triamcinolone (KENALOG) 0.1 % external cream  zolpidem (AMBIEN) 10 MG tablet          Review of Systems   Constitutional: Positive for chills and fever. Negative for appetite change.   HENT: Positive for sore throat (due to tonsilar cancer). Negative for congestion.    Respiratory: Negative for cough and shortness of breath.    Cardiovascular: Negative for chest pain.   Gastrointestinal: Positive for nausea (due to chemo). Negative for abdominal pain, constipation, diarrhea and vomiting.   Genitourinary: Negative.    Musculoskeletal: Negative.    Skin: Negative.    Neurological: Negative.    All other systems reviewed and are negative.      Physical Exam   BP: (!) 143/102  Pulse: 123  Temp: 99.6  F (37.6  C)  Resp: 18  SpO2: 95 %      Physical Exam  Constitutional:       General: He is in acute distress.      Appearance: Normal appearance. He is ill-appearing.   HENT:      Head: Normocephalic and atraumatic.      Nose: Nose normal. No congestion.      Mouth/Throat:      Mouth: Mucous membranes are moist.      Comments: Left sided tonsillar mass.  Eyes:      General: No scleral icterus.     Conjunctiva/sclera: Conjunctivae normal.   Cardiovascular:      Rate and Rhythm: Regular rhythm. Tachycardia present.   Pulmonary:      Effort: Pulmonary effort is normal. No respiratory distress.      Breath sounds: Normal breath sounds.   Abdominal:      General: There is no distension.      Palpations: Abdomen is soft.      Tenderness: There is no abdominal tenderness.   Musculoskeletal: Normal range of motion.   Skin:     General: Skin is warm and dry.   Neurological:      General: No focal deficit present.      Mental Status: He is alert and oriented to person, place, and time.         ED Course        Procedures                Results for orders placed or performed during the hospital encounter of 05/12/21 (from the past 24 hour(s))   CBC with platelets differential   Result Value Ref Range    WBC 7.9 4.0 - 11.0 10e9/L    RBC Count 4.34 (L) 4.4 - 5.9 10e12/L    Hemoglobin 12.3 (L) 13.3 - 17.7 g/dL    Hematocrit 37.1 (L) 40.0 - 53.0 %    MCV 86 78 - 100 fl    MCH 28.3 26.5 - 33.0 pg    MCHC 33.2 31.5 - 36.5 g/dL    RDW 14.2 10.0 - 15.0 %    Platelet Count 152 150 - 450 10e9/L    Diff Method Automated Method     % Neutrophils 94.1 %    % Lymphocytes 0.5 %    % Monocytes 4.8 %    % Eosinophils 0.0 %    % Basophils 0.1 %    % Immature Granulocytes 0.5 %    Nucleated RBCs 0 0 /100    Absolute Neutrophil 7.5 1.6 - 8.3 10e9/L    Absolute Lymphocytes 0.0 (L) 0.8 - 5.3 10e9/L    Absolute Monocytes 0.4 0.0 - 1.3 10e9/L    Absolute Eosinophils 0.0 0.0 - 0.7 10e9/L    Absolute Basophils 0.0 0.0 - 0.2 10e9/L    Abs Immature Granulocytes 0.0 0 - 0.4 10e9/L    Absolute Nucleated RBC 0.0    Comprehensive metabolic panel   Result Value Ref Range    Sodium 136 133 - 144 mmol/L    Potassium 4.3 3.4 - 5.3 mmol/L    Chloride 105 94 - 109 mmol/L    Carbon Dioxide 25 20 - 32 mmol/L    Anion Gap 6 3 - 14 mmol/L    Glucose 138 (H) 70 - 99 mg/dL    Urea Nitrogen 15 7 - 30 mg/dL    Creatinine 1.44 (H) 0.66 - 1.25 mg/dL    GFR Estimate 49 (L) >60 mL/min/[1.73_m2]    GFR Estimate If Black 57 (L) >60 mL/min/[1.73_m2]    Calcium 8.6 8.5 - 10.1 mg/dL    Bilirubin Total 0.3 0.2 - 1.3 mg/dL    Albumin 3.2 (L) 3.4 - 5.0 g/dL    Protein Total 6.9 6.8 - 8.8 g/dL    Alkaline Phosphatase 76 40 - 150 U/L    ALT 35 0 - 70 U/L    AST 21 0 - 45 U/L   Lactic acid whole blood   Result Value Ref Range    Lactic Acid 1.0 0.7 - 2.0 mmol/L   Symptomatic SARS-CoV-2 COVID-19 Virus (Coronavirus) by PCR    Specimen: Nasopharyngeal   Result Value Ref Range    SARS-CoV-2 Virus Specimen Source Nasopharyngeal     SARS-CoV-2 PCR Result NEGATIVE     SARS-CoV-2 PCR Comment (Note)    XR Chest  Port 1 View    Narrative    EXAM: XR CHEST PORT 1 VIEW  LOCATION: [facility]  DATE/TIME: 5/12/2021 11:13 PM    INDICATION: Fever  COMPARISON: None.      Impression    IMPRESSION: Scoliosis. Normal heart size. Linear atelectasis left base.   UA with Microscopic   Result Value Ref Range    Color Urine Yellow     Appearance Urine Clear     Glucose Urine 50 (A) NEG^Negative mg/dL    Bilirubin Urine Negative NEG^Negative    Ketones Urine Negative NEG^Negative mg/dL    Specific Gravity Urine 1.026 1.003 - 1.035    Blood Urine Negative NEG^Negative    pH Urine 5.0 5.0 - 7.0 pH    Protein Albumin Urine 30 (A) NEG^Negative mg/dL    Urobilinogen mg/dL 0.0 0.0 - 2.0 mg/dL    Nitrite Urine Negative NEG^Negative    Leukocyte Esterase Urine Negative NEG^Negative    Source Midstream Urine     WBC Urine 1 0 - 5 /HPF    RBC Urine <1 0 - 2 /HPF    Mucous Urine Present (A) NEG^Negative /LPF     *Note: Due to a large number of results and/or encounters for the requested time period, some results have not been displayed. A complete set of results can be found in Results Review.       Medications   0.9% sodium chloride BOLUS (0 mLs Intravenous Stopped 5/13/21 0106)   HYDROmorphone (PF) (DILAUDID) injection 0.5 mg (0.5 mg Intravenous Given 5/12/21 2307)       Assessments & Plan (with Medical Decision Making)     70 year old male with history of squamous cell carcinoma of the left tonsil (on chemo and radiation), Htn, hyperlipidemia and prostate cancer who presents to the emergency department for evaluation of fever. Started this evening with Temp up to 102 at home.  Denies cough or congestion.  Denies chest pain or shortness of breath.  Denies urinary symptoms.  Patient has ongoing nausea from chemo, but improves with his home medications.  Denies vomiting or diarrhea.    On arrival temp here is 99.6 and on recheck is 100.1.  Tachycardia with heart rate 123 b/min.  Normotensive.  Lung sounds CTA.  No hypoxia.  WBC is normal.   Hemoglobin 12.3.  Lactic acid is normal. Glucose 138.  Electrolytes are normal.  Creatinine 1.44 which is increased 1.15 this morning.  GFR is low at 49 and was 64 this morning. Covid-19 is negative. UA reveals 30 protein, 50 glucose, but no evidence of infection. Chest xray is negative.  Patient was given IV NS 1 liter bolus and his heart rate improved down to 100 b/min.  Patient was given Dilaudid 0.5 mg IV for pain.      I consulted with Dr. Hogue, on call for oncology regarding fevers, unknown source with no leukocytosis, blood cultures pending. No neutropenia. Dr. Hogue recommends patient can be discharged home and should follow-up tomorrow with Dr. Robbins ([facility] oncology) as planned.     I reviewed the lab and urine findings with patient.  I discussed the recommendation from oncology.  Patient instructed to continue to drink plenty of fluids.  Tylenol 650mg every 4-6 hours as needed.  Patient made aware that he needs to have his kidney function labs rechecked.        I have reviewed the nursing notes.    I have reviewed the findings, diagnosis, plan and need for follow up with the patient.      New Prescriptions    No medications on file       Final diagnoses:   Fever and chills   Abnormal renal function test       5/12/2021   [facility] EMERGENCY DEPT     Mumtaz, MARCY Joyce CNP  05/13/21 0114

## 2021-05-13 NOTE — TELEPHONE ENCOUNTER
Pt has tonsillar carcinomq squamous on XRT at [facility] ED with low grad temp. Other VS stable Temp 100.1Give fluids Tylenol and WBC 7.9.Will see Dr Robbins in Radiation oncology in morning

## 2021-05-14 ENCOUNTER — APPOINTMENT (OUTPATIENT)
Dept: RADIATION ONCOLOGY | Facility: CLINIC | Age: 71
End: 2021-05-14
Attending: RADIOLOGY
Payer: MEDICARE

## 2021-05-14 ENCOUNTER — OFFICE VISIT (OUTPATIENT)
Dept: PALLIATIVE CARE | Facility: CLINIC | Age: 71
End: 2021-05-14
Attending: INTERNAL MEDICINE
Payer: MEDICARE

## 2021-05-14 VITALS
DIASTOLIC BLOOD PRESSURE: 75 MMHG | WEIGHT: 228.2 LBS | SYSTOLIC BLOOD PRESSURE: 125 MMHG | HEART RATE: 82 BPM | RESPIRATION RATE: 16 BRPM | HEIGHT: 69 IN | TEMPERATURE: 98.2 F | BODY MASS INDEX: 33.8 KG/M2 | OXYGEN SATURATION: 97 %

## 2021-05-14 DIAGNOSIS — G47.01 INSOMNIA DUE TO MEDICAL CONDITION: ICD-10-CM

## 2021-05-14 DIAGNOSIS — C09.9 SQUAMOUS CELL CARCINOMA OF LEFT TONSIL (H): ICD-10-CM

## 2021-05-14 DIAGNOSIS — G89.3 NEOPLASM RELATED PAIN: Primary | ICD-10-CM

## 2021-05-14 PROCEDURE — G0463 HOSPITAL OUTPT CLINIC VISIT: HCPCS

## 2021-05-14 PROCEDURE — 99205 OFFICE O/P NEW HI 60 MIN: CPT | Performed by: INTERNAL MEDICINE

## 2021-05-14 PROCEDURE — 77386 HC IMRT TREATMENT DELIVERY, COMPLEX: CPT | Performed by: RADIOLOGY

## 2021-05-14 RX ORDER — METHADONE HYDROCHLORIDE 5 MG/1
2.5 TABLET ORAL EVERY 8 HOURS
Qty: 45 TABLET | Refills: 0 | Status: SHIPPED | OUTPATIENT
Start: 2021-05-14 | End: 2021-05-19

## 2021-05-14 RX ORDER — HYDROCODONE BITARTRATE AND ACETAMINOPHEN 10; 325 MG/1; MG/1
1-2 TABLET ORAL EVERY 4 HOURS PRN
Qty: 150 TABLET | Refills: 0 | Status: SHIPPED | OUTPATIENT
Start: 2021-05-14 | End: 2021-05-24

## 2021-05-14 RX ORDER — DOXEPIN HYDROCHLORIDE 10 MG/ML
SOLUTION ORAL
Qty: 118 ML | Refills: 0 | Status: SHIPPED | OUTPATIENT
Start: 2021-05-14 | End: 2021-06-28

## 2021-05-14 ASSESSMENT — MIFFLIN-ST. JEOR: SCORE: 1782.55

## 2021-05-14 ASSESSMENT — PAIN SCALES - GENERAL: PAINLEVEL: NO PAIN (0)

## 2021-05-14 NOTE — PROGRESS NOTES
Palliative Care Outpatient Clinic    (This note was transcribed using voice recognition software. While I review and edit the transcription, I may miss errors, and the software sometimes does unexpected capitalizations and formatting that I miss. Please let me know of any serious mistranscriptions and I will addend this note.)    Patient ID:  Medical - He has SCC L tonsil dx 3/2021 [id number] p16+  Definitive chemoradiotherapy 4-5/2021    He has CKD and chronic pain (headaches/sinus pain) on long-term hydrocodone therapy.  On ambien and alprazolam for insomnia/anxiety    Social - Lives with wife. 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     Care Planning -     Opioid Safety -   +naloxone  See 5/14/2021 opioid risk/safety discussion; I consider him higher risk due to long history of higher risk polysubstance use (opioids, benzos, alcohol), although without a clear BRENNAN history, self-titration of meds, anxiety.     History:  History gathered today from: patient, medical chart    About 10y hx of chronic pain sinus/HA pain for which he takes #4 7.5 mg hydrocodone/apap tabs for.  Remote hx depression, been on citalopram long term; currently denies active depression sx.  Long hx of anxiety, and insomnia; takes ambien and 3-4 0.5 mg tabs of Xanax overnight. Eg takes xanax, sleeps a few h, wakes up, takes another, etc.   Daily drinking of alcohol long-term. CAGE negative. Has cut down with his cancer but indicates interest in continuing to drink and eg asks me today if he can take wine (beer hurts his tongue and he considers liquor to be too strong).     Discusses self-adjusting meds in past (see below).    Currently he's a few weeks into definitive chemorads for his tonsilar cancer with now escalating severe pain in L face/oral cavity. He knows it will worsen. Increased his HC to #2 tabs at a time (10 a day), then this week onc told him to stop that, and do dilaudid;  2mg did nothing, 4mg a little, he ended up taking 4mg dilaudid with 1 hydrocodone tab which was helpful. No one told him to do this, he knows Leisa told him to put away the hydrocodone. Manages constipation ok with senna, prune juice. No sedation.     PE: There were no vitals taken for this visit.   Wt Readings from Last 3 Encounters:   05/13/21 103 kg (227 lb)   05/12/21 101.3 kg (223 lb 6.4 oz)   05/06/21 103.4 kg (228 lb)     Alert NAD   Plethoric face  ENT: large obvious L tonsilar mass, widespread ulcerations mass, palate, L tongue  Clear sensorium      Data reviewed:  I reviewed recent labs and imaging, my comments:  Cr 1.44, Alb 3.2  PET April L tonisilar mass + local LN invading SCM; no distant mets     database reviewed: Alpraz 0.5mg #120 5/8; Ambien; Hydrocodone/APAP 7.5 mg #150 5/6, 4/8.      Impression & Recommendations:  71 yo with SCC L tonsil undergoing definitive chemoradiation complicated by severe pain and mucositis.  Long-term chronic opioid use related to sinus/allergy HAs, anxiety, insomnia chronic benzodiazepine use; hx of daily alcohol drinking.    Long discussion with him & wife about this. What he is going through is really painful and he will require higher, and probably 'high' doses of opioids to get him through this life saving therapy. I also worry he is at high risk of harm from opioids given his long term use, anxiety, chronic alcohol and benzodiazepine use; hx of self-titration of meds (within recent past has increased hydrocodone on his own, mixed hydrocodone and dilaudid, and increased xanax use in the past too). Eg he discusses when he first started opioids he immediately felt good on them, like himself, his anxiety/worries much better--d/w him this only happens to ~~15% of patients exposed to opioids but it is a risk factor for developing a use disorder. I don't think he clearly has an BRENNAN although may have a mild one, and he is at high risk of developing one; plus the risks of  concomitant benzo use.  I had a detailed and maryse discussion with him about this today which he was engaged in, asked good questions, and was respectful.     For pain:  Stop dilaudid  Hydrocodone 10mg/APAP tabs, up to 10 a day (prior was on 10 a day of 7.5 mg tabs)  Methadone 2.5mg tid  (I'd like to start gabap too but didn't add today given all the other changes)    For insomnia and mucositis:  Doxepin  Asked him to limit xanax to 2 pills overnight not 4.  Ok to continue ambien.    No alcohol at all until a doctor tells him it is safe to resume    D/w him CBT for insomnia as a long term thing when he is through his cancer treatments.    D/w him realistically we'll escalate opioids during his tx, that is necessary and inevitable to get him through this life saving therapy, but will be important to reduce doses back to his long term doses after he's done; this typically takes 1-2 months; at this point we'd hand his opioid / pain management back to his PCP who has managed it long term. D/w him long term he could consider seeing a cephalalgist; vs a chronic pain program; he may benefit from a sleep eval as well given his insomnia polypharmacy. D/w him buprenorphine may be a better long term opioid for him given its excellent safety profile vs other opioids.     We'll call him next week  Follow-up 2-3 weeks    90 minutes spent on the date of the encounter doing chart review, history and exam, patient education & counseling, documentation and other activities as noted above.    Thank you for involving us in the patient's care.   Elier Serrano MD / Palliative Medicine / Text me via [facility].

## 2021-05-14 NOTE — PATIENT INSTRUCTIONS
Thank you for meeting with us in the [facility] Palliative Care Clinic.    How to get a hold of us:  For non-urgent matters, MyChart works best.    For more urgent matters, or if you prefer not to use MyChart, call our clinic nurse coordinator Gabriella Shaw RN at [phone].    We have an on-call number for evenings and weekends. Please call this only if you are having uncontrolled symptoms or serious side effects from your medicines: [phone].     For refills, please give us a week (5 working days) notice. We don't always have providers available everyday to do refills. If you call the day you run out of your medicine, we may not be able to refill it in time, so call 5 days in advance!      [facility] Palliative Care [facility]   The [facility] Palliative Care Team is committed to treating your pain and other symptoms. This handout is for all patients treated with opioid medications in our clinics.     What are opioid medicines?   Opioid medications are used to ease some types of pain and shortness of breath. They are sometimes called narcotics. They include: Morphine (MS Contin, Roxanol), Oxycodone (OxyContin, OxyFast, Percocet), Hydrocodone (Vicodin, Norco), Hydromorphone (Dilaudid), Fentanyl (Duragesic), Methadone (Dolophine).   Are opioid medicines safe to take?   Opioid medicines are safe when you follow the directions from your doctor or medical provider.  Opioids can cause serious side effects and become unsafe if you do not follow your instructions.   To make sure you are taking opioid medicines safely, we may ask you to bring in your pill bottles or to allow us to test your urine. Our goal is to keep you safe and to improve your ability to function & be active. If we don t think opioids are safely doing that, we will work with you to taper you off of them.   Do not drive unless your medical provider tells you it is safe.   Do not take opioids with  alcohol or anxiety/sleep medicines unless your doctor tells you it is ok to do so.   Are opioids addictive?   Addiction means you crave a drug and have trouble limiting the amount you use.   Addiction is more likely to happen if you take opioids to relieve stress or to feel altered. If you or your loved one feels this way when taking opioid medicines, let your medical provider know. We may refer you for additional assessment or services if this is a concern.   Your body will get used to the opioid medicines if you take them for more than two weeks in a row. This means if you stop them suddenly, you may have withdrawal. If this occurs, you will feel uncomfortable (nausea, diarrhea, increased pain). This does not mean you are addicted. Never stop taking your pain medicines all at once unless your medical provider tells you to. If you think you need less medicine, your medical provider will help you to safely lower your dose.   What is the safest way to store opioids?   Keep your medicines in a safe place away from children, teens, pets, and visitors. Be careful about who knows that you have these medicines.   How do I get rid of my old opioids?   Opioids should be brought to your [facility] drop-off site, or you can purchase a disposal kit from your local pharmacy. If neither of these options is available, the Food and Drug Administration recommends that you flush unused opioids down the toilet.   Do not share or sell your pain medicines. This is illegal and very dangerous. We cannot prescribe opioid pain medicines to you if you do this.   How do I get refills?   Opioid medicine prescriptions have special safety features. This means it may take longer to refill than other medicines. Our clinic cannot prescribe them on weekends or at night.     Give us one week s notice when requesting a refill. This gives us the time we need to get it signed and back to you. It may be possible to mail prescriptions to  you.      Constipation and Opioid (Pain Medicine) Use    Constipation is when you are not able to have a bowel movement (BM) for several days. Constipation can also mean that stools that are hard or difficult to pass without straining and pushing. Constipation is a common problem and the reason many people are admitted to the hospital. Even if you are not eating, you still need to have a bowel movement at least every other day.      Taking opioids (pain medicine) will make you constipated. This problem will not go away as long as you are taking opioids. Common opioids which are prescribed are: hydrocodone (Vicodin , Norco , Lortab ); morphine (MSContin , MSIR ); oxycodone (OxyContin , Percocet , OxyIR , Roxicodone); hydromorphone (Dilaudid , Exalgo ); fentanyl (Duragesic ); methadone.    What can I do to avoid constipation?  Most people taking opioids need to take laxatives every day to prevent constipation.  See the guide below for using laxatives.       Step Medicine Directions     1.  You should start taking these medicines the same day you start taking opioids (pain medication).    Senna 8.6mg tablets (over the counter medication - you do not need a prescription).  Also known as Senokot or Ex-lax 1-2 tablets twice a day. May increase up to 4 tablets twice a day if needed for a daily bowel movement and may need to decrease if your bowels become loose.   2.  If you have not had a bowel movement in 48 hours, CALL YOUR DOCTOR. Your doctor may tell you to use one of the medicines listed below:    Miralax (polyethylene glycol) Over the counter medication - you do not need a prescription.  17g dissolved in 4-8oz of liquid once or twice a day. Again, may need to increase to twice a day if not having a daily bowel movement or decrease if stools become loose.    Milk of Magnesia (Over the counter medication - you do not need a prescription.) 1-2 tablespoons once a day           When should I call my doctor?  Call your  doctor if you have not had a bowel movement 24 hours after following the above steps.    You should also call your doctor if you have any of the following symptoms:    Watery stools (this can be a sign of severe constipation or too much bowel medicine)    No bowel movement in 48 hours     Small stools; Hard stools    Pain    Special Instructions:    _______________________________________________________________    _______________________________________________________________    _______________________________________________________________    _______________________________________________________________    _______________________________________________________________    _______________________________________________________________    _______________________________________________________________    _______________________________________________________________    _______________________________________________________________        INVESTIGATE BUPRENORPHINE FOR LONG-TERM CHRONIC PAIN.

## 2021-05-14 NOTE — NURSING NOTE
"Oncology Rooming Note    May 14, 2021 9:25 AM   Quan Murphy is a 70 year old male who presents for:    Chief Complaint   Patient presents with     Oncology Clinic Visit     New pt- Squamous cell carcinoma of left tonsil     Initial Vitals: /75 (BP Location: Right arm, Patient Position: Sitting, Cuff Size: Adult Large)   Pulse 82   Temp 98.2  F (36.8  C) (Tympanic)   Resp 16   Ht 1.74 m (5' 8.5\")   Wt 103.5 kg (228 lb 3.2 oz)   SpO2 97%   BMI 34.19 kg/m   Estimated body mass index is 34.19 kg/m  as calculated from the following:    Height as of this encounter: 1.74 m (5' 8.5\").    Weight as of this encounter: 103.5 kg (228 lb 3.2 oz). Body surface area is 2.24 meters squared.  No Pain (0) Comment: Data Unavailable   No LMP for male patient.  Allergies reviewed: Yes  Medications reviewed: Yes    Medications: Medication refills not needed today.  Pharmacy name entered into [employer]:    [facility] [place], [place] - [address] [place] [address] [address] - [place], [place] - [address]  [facility] [place], [place] - [address] [place] [address] [phone]    Clinical concerns: N/A       Chiquis Orozco CMA              "

## 2021-05-14 NOTE — LETTER
5/14/2021       RE: Quan Murphy  [address]  [place] [facility] [address]     Dear Colleague,    [facility] you [facility] your patient, [facility]an Murphy[facility]. Please see a copy of my visit note below.    Palliative Care Outpatient Clinic    (This note was transcribed using voice recognition software. While I review and edit the transcription, I may miss errors, and the software sometimes does unexpected capitalizations and formatting that I miss. Please let me know of any serious mistranscriptions and I will addend this note.)    Patient ID:  Medical - He has SCC L tonsil dx 3/2021 [id number] p16+  Definitive chemoradiotherapy 4-5/2021    He has CKD and chronic pain (headaches/sinus pain) on long-term hydrocodone therapy.  On ambien and alprazolam for insomnia/anxiety    Social - Lives with wife. 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     Care Planning -     Opioid Safety -   +naloxone  See 5/14/2021 opioid risk/safety discussion; I consider him higher risk due to long history of higher risk polysubstance use (opioids, benzos, alcohol), although without a clear BRENNAN history, self-titration of meds, anxiety.     History:  History gathered today from: patient, medical chart    About 10y hx of chronic pain sinus/HA pain for which he takes #4 7.5 mg hydrocodone/apap tabs for.  Remote hx depression, been on citalopram long term; currently denies active depression sx.  Long hx of anxiety, and insomnia; takes ambien and 3-4 0.5 mg tabs of Xanax overnight. Eg takes xanax, sleeps a few h, wakes up, takes another, etc.   Daily drinking of alcohol long-term. CAGE negative. Has cut down with his cancer but indicates interest in continuing to drink and eg asks me today if he can take wine (beer hurts his tongue and he considers liquor to be too strong).     Discusses  self-adjusting meds in past (see below).    Currently he's a few weeks into definitive chemorads for his tonsilar cancer with now escalating severe pain in L face/oral cavity. He knows it will worsen. Increased his HC to #2 tabs at a time (10 a day), then this week onc told him to stop that, and do dilaudid; 2mg did nothing, 4mg a little, he ended up taking 4mg dilaudid with 1 hydrocodone tab which was helpful. No one told him to do this, he knows Leisa told him to put away the hydrocodone. Manages constipation ok with senna, prune juice. No sedation.     PE: There were no vitals taken for this visit.   Wt Readings from Last 3 Encounters:   05/13/21 103 kg (227 lb)   05/12/21 101.3 kg (223 lb 6.4 oz)   05/06/21 103.4 kg (228 lb)     Alert NAD   Plethoric face  ENT: large obvious L tonsilar mass, widespread ulcerations mass, palate, L tongue  Clear sensorium      Data reviewed:  I reviewed recent labs and imaging, my comments:  Cr 1.44, Alb 3.2  PET April L tonisilar mass + local LN invading SCM; no distant mets     database reviewed: Alpraz 0.5mg #120 5/8; Ambien; Hydrocodone/APAP 7.5 mg #150 5/6, 4/8.      Impression & Recommendations:  71 yo with SCC L tonsil undergoing definitive chemoradiation complicated by severe pain and mucositis.  Long-term chronic opioid use related to sinus/allergy HAs, anxiety, insomnia chronic benzodiazepine use; hx of daily alcohol drinking.    Long discussion with him & wife about this. What he is going through is really painful and he will require higher, and probably 'high' doses of opioids to get him through this life saving therapy. I also worry he is at high risk of harm from opioids given his long term use, anxiety, chronic alcohol and benzodiazepine use; hx of self-titration of meds (within recent past has increased hydrocodone on his own, mixed hydrocodone and dilaudid, and increased xanax use in the past too). Eg he discusses when he first started opioids he immediately  felt good on them, like himself, his anxiety/worries much better--d/w him this only happens to ~~15% of patients exposed to opioids but it is a risk factor for developing a use disorder. I don't think he clearly has an BRENNAN although may have a mild one, and he is at high risk of developing one; plus the risks of concomitant benzo use.  I had a detailed and maryse discussion with him about this today which he was engaged in, asked good questions, and was respectful.     For pain:  Stop dilaudid  Hydrocodone 10mg/APAP tabs, up to 10 a day (prior was on 10 a day of 7.5 mg tabs)  Methadone 2.5mg tid  (I'd like to start gabap too but didn't add today given all the other changes)    For insomnia and mucositis:  Doxepin  Asked him to limit xanax to 2 pills overnight not 4.  Ok to continue ambien.    No alcohol at all until a doctor tells him it is safe to resume    D/w him CBT for insomnia as a long term thing when he is through his cancer treatments.    D/w him realistically we'll escalate opioids during his tx, that is necessary and inevitable to get him through this life saving therapy, but will be important to reduce doses back to his long term doses after he's done; this typically takes 1-2 months; at this point we'd hand his opioid / pain management back to his PCP who has managed it long term. D/w him long term he could consider seeing a cephalalgist; vs a chronic pain program; he may benefit from a sleep eval as well given his insomnia polypharmacy. D/w him buprenorphine may be a better long term opioid for him given its excellent safety profile vs other opioids.     We'll call him next week  Follow-up 2-3 weeks    90 minutes spent on the date of the encounter doing chart review, history and exam, patient education & counseling, documentation and other activities as noted above.    Thank you for involving us in the patient's care.   Elier Serrano MD / Palliative Medicine / Text me via [facility].

## 2021-05-17 ENCOUNTER — HOSPITAL ENCOUNTER (OUTPATIENT)
Dept: NUTRITION | Facility: CLINIC | Age: 71
Discharge: HOME | End: 2021-05-17
Attending: RADIOLOGY | Admitting: RADIOLOGY
Payer: MEDICARE

## 2021-05-17 ENCOUNTER — APPOINTMENT (OUTPATIENT)
Dept: RADIATION ONCOLOGY | Facility: CLINIC | Age: 71
End: 2021-05-17
Attending: RADIOLOGY
Payer: MEDICARE

## 2021-05-17 ENCOUNTER — PATIENT OUTREACH (OUTPATIENT)
Dept: PALLIATIVE CARE | Facility: CLINIC | Age: 71
End: 2021-05-17

## 2021-05-17 DIAGNOSIS — G89.3 NEOPLASM RELATED PAIN: ICD-10-CM

## 2021-05-17 DIAGNOSIS — C09.9 SQUAMOUS CELL CARCINOMA OF LEFT TONSIL (H): ICD-10-CM

## 2021-05-17 PROCEDURE — 97803 MED NUTRITION INDIV SUBSEQ: CPT | Mod: TEL,GZ | Performed by: DIETITIAN, REGISTERED

## 2021-05-17 PROCEDURE — 77386 HC IMRT TREATMENT DELIVERY, COMPLEX: CPT | Performed by: RADIOLOGY

## 2021-05-17 NOTE — PROGRESS NOTES
"Received 2 VMs from patient. He advised he spoke with Dr. Serrano last week and he had some long term concerns about patient's pain medication. He says he doesn't see himself \"going down that road,\" but he is trying to listen.     The \"extra strong dose\" of hydrocodone and that is working. Doesn't think the methadone is working though and asks if that dose can be adjusted.     He also advised that Dr. Serrano asked him to try to decrease is xanax from 4 down to 2 tabs, he says he tried but without 4 he won't sleep. Without sleep he is run down and already has a compromised immune system so doesn't know what to do.     Nausea also an issue - last night he tried to hold back on pain meds and nausea was really bad and he ended up throwing up from pain. Says that was awful with how his throat has been.     Also wanted to mention that it is important that we know he has not touched any alcohol. He says he brought up the possibility of wine, but that didn't go well. Says he doesn't know why he brought it up since it doesn't sound good and would burn. Wants to be clear to MD that no alcohol is in the mix right now.     --    Called him back - he thinks the hydrocodone is working. He says he is allowed up to #10 tabs per day and so that is what he is taking. Says sometimes he tried to take only one at a time, but that he usually finds he needs the second one soon after so mostly taking 2 at a time every 5 hours or so. Is sticking with no more the #10 in 24 hour period. Feels hydrocodone is working well and he was glad to have it today.     Says last night nausea was an issue as he was trying to stretch the interval between hydrocodone doses and that made him nauseated and he did vomit. Nausea is better today. Thinks it gets bad on weekends following treatment.    Doesn't think methadone is doing anything. Is taking 1/2 tab TID. Denies side effects. No constipation (except some from zofran he manages with stool softener). " "Explained methadone takes 5-7 days to get to peak effective level for patients and that generally we do not recommend dose adjustments until day 5 at the earliest. He verbalized understanding.     He reports that sleeping has always been an issue for him. He says he does tend to wake up overnight and if it's been 5-6 hours will take meds then so he doesn't get behind on pain and have to play catch up.     Doxepine \"doesn't do anything but burn\" and he is still needing to take 4 xanax. He wonders if he could try 2 ambien, thinks he could then reduce xanax. Advised he should not make that adjustment on his own, but that I would of course let the MD know his question. He is clear he will not adjust medications on his own.     He also reiterates again that he is not using alcohol right now and will not.      Will route update to MD for review.  "

## 2021-05-17 NOTE — PROGRESS NOTES
"The patient has been notified of the following:      \"We have found that certain health care needs can be provided without the need for a face to face visit.  This service lets us provide the care you need with a phone conversation.       I will have full access to your [facility] medical record during this entire phone call.   I will be taking notes for your medical record.      Since this is like an office visit, we will bill your insurance company for this service.       There are potential benefits and risks of telephone visits (e.g. limits to patient confidentiality) that differ from in-person visits.?  Confidentiality still applies for telephone services, and nobody will record the visit.  It is important to be in a quiet, private space that is free of distractions (including cell phone or other devices) during the visit.??      If during the course of the call I believe a telephone visit is not appropriate, you will not be charged for this service\"     Consent has been obtained for this service by care team member: Yes     CLINICAL NUTRITION SERVICES - REASSESSMENT NOTE   EVALUATION OF PREVIOUS PLAN OF CARE:   Referring Physician: Itzel  Time spent with patient: 30 minutes.    Current diet/appetite: Good - general diet  Chemotherapy: Starting 4/28 - LD Cisplatin  Radiation: Starting 4/28 (~14/33 fractions completed)  Accompanied by: his wife, Mounika  Monitoring from previous assessment:   -Food intake - New tells me that eating has become more difficult due to odynophagia and dysgeusia.  He has been focusing on softer foods such as pancakes, soups and mashed potatoes.  He is interested in finding other foods to help maximize calories and protein to meet his nutrition needs in prevention of needing a feeding tube.    He is willing to take protein shakes as he likes the flavor of [employer] and [employer].    -Fluid/beverage intake - 2 cups electrolyte fluids and >8 cups water/day  -Liquid meal replacement or " supplement - [employer] powder, 1 shake /day mixed with [employer] protein shakes.   -Weight trends - up 4 lb since initiation of treatment  Wt Readings from Last 6 Encounters:   05/14/21 103.5 kg (228 lb 3.2 oz)   05/13/21 103 kg (227 lb)   05/12/21 101.3 kg (223 lb 6.4 oz)   05/06/21 103.4 kg (228 lb)   05/05/21 102.2 kg (225 lb 4.8 oz)   04/29/21 105.7 kg (233 lb)     Previous Goals:   1.  Aim for 5-6 small frequent meals  2.  Aim for 2400kcal and 100g protein/day  3. Weight maintenance   Evaluation: Met /ongoing  Previous Nutrition Diagnosis:   Predicted inadequate nutrient intake related to cancer treatment to head/neck region   Evaluation: No change   NEW FINDINGS:   No new findings   CURRENT NUTRITION DIAGNOSIS   Predicted inadequate nutrient intake related to cancer treatment to head/neck region   INTERVENTIONS   Composition of Meals and Snacks - reviewed ways to fortify foods with calories and protein. Encouraged to add fats and proteins to foods such as olive oil, butter, powdered milk etc. Reviewed soft foods to try such as cream based soups, cream of wheat, polenta and home made smoothies/shakes. Reviewed calorie, protein and fluid needs (2400kcal, 100g protein, >10 cups non-caffeine containing bevg)  Medical Food Supplement - reviewed supplements and shakes such as Scandi and Benecalorie.    Goals  1.  Aim for 5-6 small frequent meals  2.  Aim for 2400kcal and 100g protein/day  3. Weight maintenance      Follow-Up Plans: Pt has RD contact information for questions.  Scheduled for follow-up in one week, May 24th at 9:30 - message sent to rad/onc scheduling.     MONITORING AND EVALUATION:  -Food/beverage intake  -Weight trends     Crystal Ramos RD, LD

## 2021-05-18 ENCOUNTER — APPOINTMENT (OUTPATIENT)
Dept: RADIATION ONCOLOGY | Facility: CLINIC | Age: 71
End: 2021-05-18
Attending: RADIOLOGY
Payer: MEDICARE

## 2021-05-18 PROCEDURE — 77386 HC IMRT TREATMENT DELIVERY, COMPLEX: CPT | Performed by: RADIOLOGY

## 2021-05-18 PROCEDURE — 77427 RADIATION TX MANAGEMENT X5: CPT | Performed by: RADIOLOGY

## 2021-05-18 PROCEDURE — 77336 RADIATION PHYSICS CONSULT: CPT | Performed by: RADIOLOGY

## 2021-05-18 PROCEDURE — 77014 PR CT GUIDE FOR PLACEMENT RADIATION THERAPY FIELDS: CPT | Mod: 26 | Performed by: RADIOLOGY

## 2021-05-18 NOTE — PROGRESS NOTES
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 Christensen). Path: SCC, non keratinizing, p16 +gardenia  3/23/21 CT neck. 2.9 x 2.7 x 3.5 cm L tonsil fullness involving soft palate, level 2-3 neck nodes  4/2/21 PET/CT. 2.7 x 2.2 x 2.7 cm L palatine tonsil mass (SUV 24), extension to oral cavity, 3.4 x 2.0 cm L level 4 node invading SCM, 1.8 x 1.8 cm L level 2A/3 node (SUV 7.9) w/ECS, 1.4 x 0.9 cm R level 2A node (SUV 6.7)    Concurrent chemoradiation with weekly cisplatin began 4/28/21    SUBJECTIVE  New is taking 10 norco tabs in 24 hours, every 4-5 hours he will take 2 tabs with near good pain control. No alcohol use. As far as nutrition, he is mainly doing liquid/soft intake now with 2 shakes that are high-debby (unsure how many cals) plus pudding, cream of wheat, pancakes, etc. In addition to sipping on Gatorade, he drinks at least 3 water bottles of water per day. Sometimes feels full when he is drinking all these liquids. He is using biotene regularly and doing the salt and soda rinses. He can't recall if he tried MMW but does note the doxepin burned when he tried it. He is doing swallowing exercises. Nausea controlled with compazine TID. Secretions are thicker.    No change in hearing, no neuropathy. No fever or chills since last Thursday when in ED. No breathing concerns. No other new concerns.      10 point review of systems otherwise negative    CURRENT OUTPATIENT MEDICATIONS  Current Outpatient Medications   Medication Sig Dispense Refill     ALPRAZolam  (XANAX) 0.5 MG tablet TAKE 1 TABLET (0.5 MG) BY MOUTH 4 TIMES DAILY AS NEEDED FOR ANXIETY 120 tablet 1     ASPIRIN ADULT LOW STRENGTH 81 MG EC tablet TAKE ONE TABLET BY MOUTH ONCE DAILY 90 tablet 1     citalopram (CELEXA) 40 MG tablet TAKE ONE TABLET BY MOUTH ONCE DAILY 30 tablet 8     doxepin (SINEQUAN) 10 MG/ML (HIGH CONC) solution Mouth pain: take 1.5 mL mixed with equal amount tap water. Swish for 60 sec then spit. Take every 4 h as needed. Sleep: take 0.6 mL at night. May repeat this once more overnight. Do not take more than 2 xanax overnight. 118 mL 0     HYDROcodone-acetaminophen (NORCO)  MG per tablet Take 1-2 tablets by mouth every 4 hours as needed for severe pain (No more than 10 a day. Stop dilaudid.) 150 tablet 0     HYDROmorphone (DILAUDID) 2 MG tablet Take 1-2 tablets (2-4 mg) by mouth every 4 hours as needed for moderate to severe pain 36 tablet 0     hydrOXYzine (VISTARIL) 25 MG capsule TAKE 1 CAPSULE (25 MG) BY MOUTH 4 TIMES DAILY AS NEEDED FOR ANXIETY 120 capsule 3     magic mouthwash (ENTER INGREDIENTS IN COMMENTS) suspension Take 10 mLs by mouth every 4 hours as needed (pain) Swish and spit 200 mL 1     methadone (DOLOPHINE) 5 MG tablet Take 0.5 tablets (2.5 mg) by mouth every 8 hours 45 tablet 0     metoprolol succinate ER (TOPROL-XL) 50 MG 24 hr tablet Take 1 tablet (50 mg) by mouth daily 90 tablet 3     nitroGLYcerin (NITROSTAT) 0.4 MG sublingual tablet For chest pain place 1 tablet under the tongue every 5 minutes for 3 doses. If symptoms persist 5 minutes after 1st dose call 911. 25 tablet 0     omeprazole (PRILOSEC) 40 MG DR capsule Take 1 capsule (40 mg) by mouth daily 90 capsule 3     ondansetron (ZOFRAN-ODT) 8 MG ODT tab Take 1 tablet (8 mg) by mouth every 8 hours as needed for nausea 30 tablet 11     prochlorperazine (COMPAZINE) 10 MG tablet Take 0.5 tablets (5 mg) by mouth every 6 hours as needed (Nausea/Vomiting) 30 tablet 11     rosuvastatin (CRESTOR) 40 MG tablet Take 1  tablet (40 mg) by mouth daily 90 tablet 1     testosterone (ANDROGEL 1.62 % PUMP) 20.25 MG/ACT gel testosterone 20.25 mg/1.25 gram (1.62 %) transdermal gel pump       zolpidem (AMBIEN) 10 MG tablet TAKE ONE TABLET BY MOUTH EVERY EVENING AS NEEDED FOR SLEEP 30 tablet 5     PHYSICAL EXAM  /86   Pulse 83   Temp 98.8  F (37.1  C) (Oral)   Resp 16   Wt 99.9 kg (220 lb 4.8 oz)   SpO2 97%   BMI 33.01 kg/m    Wt Readings from Last 10 Encounters:   05/14/21 103.5 kg (228 lb 3.2 oz)   05/13/21 103 kg (227 lb)   05/12/21 101.3 kg (223 lb 6.4 oz)   05/06/21 103.4 kg (228 lb)   05/05/21 102.2 kg (225 lb 4.8 oz)   04/29/21 105.7 kg (233 lb)   04/28/21 102.8 kg (226 lb 9.6 oz)   04/02/21 102 kg (224 lb 13.9 oz)   03/22/21 101.1 kg (222 lb 12.5 oz)   03/04/21 98.9 kg (218 lb)   General: The patient is a pleasant male in no acute distress.  HEENT: EOMI, PERRL. Sclerae are anicteric. Oral mucosa is pink, diffuse mucositis.   Lymph: Neck is supple with Level 3 left node about 2 cm in size palpable in the neck. level 2 node less palpable today, no other lymphadenopathy in the cervical or supraclavicular areas.   Heart: Regular rate and rhythm.   Lungs: Clear to auscultation bilaterally.   Abdomen: Bowel sounds present, soft, nontender with no palpable hepatosplenomegaly or masses.   Extremities: No lower extremity edema noted bilaterally.   Neuro: Cranial nerves II through XII are grossly intact.  Skin: No rashes, petechiae, or bruising noted on exposed skin.    LABORATORY AND IMAGING STUDIES     Ref. Range 5/19/2021 07:54   Sodium Latest Ref Range: 133 - 144 mmol/L 135   Potassium Latest Ref Range: 3.4 - 5.3 mmol/L 4.0   Chloride Latest Ref Range: 94 - 109 mmol/L 102   Carbon Dioxide Latest Ref Range: 20 - 32 mmol/L 28   Urea Nitrogen Latest Ref Range: 7 - 30 mg/dL 23   Creatinine Latest Ref Range: 0.66 - 1.25 mg/dL 1.16   GFR Estimate Latest Ref Range: >60 mL/min/1.73_m2 64   GFR Estimate If Black Latest Ref Range: >60  mL/min/1.73_m2 74   Calcium Latest Ref Range: 8.5 - 10.1 mg/dL 8.9   Anion Gap Latest Ref Range: 3 - 14 mmol/L 4   Magnesium Latest Ref Range: 1.6 - 2.3 mg/dL 1.9   Albumin Latest Ref Range: 3.4 - 5.0 g/dL 3.0 (L)   Protein Total Latest Ref Range: 6.8 - 8.8 g/dL 6.8   Bilirubin Total Latest Ref Range: 0.2 - 1.3 mg/dL 0.4   Alkaline Phosphatase Latest Ref Range: 40 - 150 U/L 69   ALT Latest Ref Range: 0 - 70 U/L 22   AST Latest Ref Range: 0 - 45 U/L 17   Glucose Latest Ref Range: 70 - 99 mg/dL 106 (H)   WBC Latest Ref Range: 4.0 - 11.0 10e9/L 5.1   Hemoglobin Latest Ref Range: 13.3 - 17.7 g/dL 11.7 (L)   Hematocrit Latest Ref Range: 40.0 - 53.0 % 36.2 (L)   Platelet Count Latest Ref Range: 150 - 450 10e9/L 140 (L)   RBC Count Latest Ref Range: 4.4 - 5.9 10e12/L 4.18 (L)   MCV Latest Ref Range: 78 - 100 fl 87   MCH Latest Ref Range: 26.5 - 33.0 pg 28.0   MCHC Latest Ref Range: 31.5 - 36.5 g/dL 32.3   RDW Latest Ref Range: 10.0 - 15.0 % 13.9   Diff Method Unknown Automated Method   % Neutrophils Latest Units: % 72.4   % Lymphocytes Latest Units: % 10.6   % Monocytes Latest Units: % 14.6   % Eosinophils Latest Units: % 1.8   % Basophils Latest Units: % 0.4   % Immature Granulocytes Latest Units: % 0.2   Nucleated RBCs Latest Ref Range: 0 /100 0   Absolute Neutrophil Latest Ref Range: 1.6 - 8.3 10e9/L 3.7   Absolute Lymphocytes Latest Ref Range: 0.8 - 5.3 10e9/L 0.5 (L)   Absolute Monocytes Latest Ref Range: 0.0 - 1.3 10e9/L 0.7   Absolute Eosinophils Latest Ref Range: 0.0 - 0.7 10e9/L 0.1   Absolute Basophils Latest Ref Range: 0.0 - 0.2 10e9/L 0.0   Abs Immature Granulocytes Latest Ref Range: 0 - 0.4 10e9/L 0.0   Absolute Nucleated RBC Unknown 0.0       ASSESSMENT AND PLAN  SCC L tonsil, [id number], p16 +gardenia, ECS +gardenia: began chemotherapy 4/28/21 as a radiosensitizer to radiation with weekly cisplatin, recommended because of CKD with intermittent SURESH. Plan for about 6 doses concurrently with radiation. He is tolerating okay  ex some nausea and increase in pain. Kidney function around baseline. Proceed today with week 4.   --follow up weekly, sees Dr. Schrader next week    CKD; baseline looks to be around 1.1: SURESH resolved, Cr better today. RADHA supporing pre-renal    Nausea: using compazine TID and can take zofran prn starting saturday. No ativan.     Nutrition: weight down a bit. Talked about increasing shakes from 2 to 3x daily and adding butter/whole milk to cream of wheat in the morning. He meets with Crystal Monday. Offered IVF if he is feeling dehydrated at any point during the week, declined for now but will let us know.     Fever: had LFG last week day after chemo prompting trip to ED. No fevers since and no ifx concerns today. monitor    Acute treatment pain on chronic opioid use: has historically used hydrocodone-acetaminophen 4x daily at baseline for his sinuses, has had longstanding issues with that and has tried many many interventions without success. Pain increasing with treatment as expected. Recently saw Dr. Reza, please see his note for full details but in brief he started New on Norco  with max 10 tabs daily and methadone 2.5 TID. This is keeping New's pain under tolerable control but he asks about increasing methadone to 6x daily from 3 since the 2.5 mg seems to not be helping. Reminded him why he should not self titrate and how we need to direct increases, especially with onset of methadone effect. Confirmed he is not taking dilaudid in addition to the above.   -again discussed tyring MMW prn; particularly good for prior to eating or if needs acute breakthrough relief could try.   --Will send message to Gabriella in follow up of her phone call 5/17 with New, perhaps methadone can be increased prior to weekend.     Anxiety: Patient has tried a number of medications in the past. Referral placed to psychiatry after one of his initial visits but he cancelled this as he feels it is unnecessary.   --He is  currently on the maximum dose of Celexa at 40 mg daily. Upon chart review, it appears he has previously been on Abilify, buspirone, escitalopram, sertraline, and hydroxyzine.  --Also uses alprazolam nightly along with zolpidem for sleep, he was unable to wean down on alprazolam as Dr. Serrano recommended     ASCVD:  See note by Dr. Ch, Cardiology, from 10/20/20. Confirmed he stopped lisinopril and metoprolol increased, but he cannot recall what dose. BP ok today. Monitor trend    Exercise: He can continue going to the gym as he tolerates.     COVID: Has been vaccinated.    Alcohol use: abstaining per last visit with Dr. Serrano     60 minutes spent on the date of the encounter doing chart review, review of test results, interpretation of tests, patient visit, documentation and discussion with other provider(s)     Leisa Zarate CNP on 5/19/2021 at 9:02 AM

## 2021-05-19 ENCOUNTER — APPOINTMENT (OUTPATIENT)
Dept: LAB | Facility: CLINIC | Age: 71
End: 2021-05-19
Attending: INTERNAL MEDICINE
Payer: MEDICARE

## 2021-05-19 ENCOUNTER — ONCOLOGY VISIT (OUTPATIENT)
Dept: ONCOLOGY | Facility: CLINIC | Age: 71
End: 2021-05-19
Attending: INTERNAL MEDICINE
Payer: MEDICARE

## 2021-05-19 ENCOUNTER — APPOINTMENT (OUTPATIENT)
Dept: RADIATION ONCOLOGY | Facility: CLINIC | Age: 71
End: 2021-05-19
Attending: RADIOLOGY
Payer: MEDICARE

## 2021-05-19 ENCOUNTER — THERAPY VISIT (OUTPATIENT)
Dept: SPEECH THERAPY | Facility: CLINIC | Age: 71
End: 2021-05-19
Payer: MEDICARE

## 2021-05-19 VITALS
TEMPERATURE: 98.8 F | WEIGHT: 220.3 LBS | DIASTOLIC BLOOD PRESSURE: 86 MMHG | RESPIRATION RATE: 16 BRPM | HEART RATE: 83 BPM | OXYGEN SATURATION: 97 % | BODY MASS INDEX: 33.01 KG/M2 | SYSTOLIC BLOOD PRESSURE: 131 MMHG

## 2021-05-19 DIAGNOSIS — C09.9 SQUAMOUS CELL CARCINOMA OF LEFT TONSIL (H): Primary | ICD-10-CM

## 2021-05-19 DIAGNOSIS — C09.9 SQUAMOUS CELL CARCINOMA OF TONSIL (H): ICD-10-CM

## 2021-05-19 DIAGNOSIS — E83.42 HYPOMAGNESEMIA: ICD-10-CM

## 2021-05-19 DIAGNOSIS — R13.12 OROPHARYNGEAL DYSPHAGIA: Primary | ICD-10-CM

## 2021-05-19 LAB
ALBUMIN SERPL-MCNC: 3 G/DL (ref 3.4–5)
ALP SERPL-CCNC: 69 U/L (ref 40–150)
ALT SERPL W P-5'-P-CCNC: 22 U/L (ref 0–70)
ANION GAP SERPL CALCULATED.3IONS-SCNC: 4 MMOL/L (ref 3–14)
AST SERPL W P-5'-P-CCNC: 17 U/L (ref 0–45)
BACTERIA SPEC CULT: NO GROWTH
BACTERIA SPEC CULT: NO GROWTH
BASOPHILS # BLD AUTO: 0 10E9/L (ref 0–0.2)
BASOPHILS NFR BLD AUTO: 0.4 %
BILIRUB SERPL-MCNC: 0.4 MG/DL (ref 0.2–1.3)
BUN SERPL-MCNC: 23 MG/DL (ref 7–30)
CALCIUM SERPL-MCNC: 8.9 MG/DL (ref 8.5–10.1)
CHLORIDE SERPL-SCNC: 102 MMOL/L (ref 94–109)
CO2 SERPL-SCNC: 28 MMOL/L (ref 20–32)
CREAT SERPL-MCNC: 1.16 MG/DL (ref 0.66–1.25)
DIFFERENTIAL METHOD BLD: ABNORMAL
EOSINOPHIL # BLD AUTO: 0.1 10E9/L (ref 0–0.7)
EOSINOPHIL NFR BLD AUTO: 1.8 %
ERYTHROCYTE [DISTWIDTH] IN BLOOD BY AUTOMATED COUNT: 13.9 % (ref 10–15)
GFR SERPL CREATININE-BSD FRML MDRD: 64 ML/MIN/{1.73_M2}
GLUCOSE SERPL-MCNC: 106 MG/DL (ref 70–99)
HCT VFR BLD AUTO: 36.2 % (ref 40–53)
HGB BLD-MCNC: 11.7 G/DL (ref 13.3–17.7)
IMM GRANULOCYTES # BLD: 0 10E9/L (ref 0–0.4)
IMM GRANULOCYTES NFR BLD: 0.2 %
LYMPHOCYTES # BLD AUTO: 0.5 10E9/L (ref 0.8–5.3)
LYMPHOCYTES NFR BLD AUTO: 10.6 %
MAGNESIUM SERPL-MCNC: 1.9 MG/DL (ref 1.6–2.3)
MCH RBC QN AUTO: 28 PG (ref 26.5–33)
MCHC RBC AUTO-ENTMCNC: 32.3 G/DL (ref 31.5–36.5)
MCV RBC AUTO: 87 FL (ref 78–100)
MONOCYTES # BLD AUTO: 0.7 10E9/L (ref 0–1.3)
MONOCYTES NFR BLD AUTO: 14.6 %
NEUTROPHILS # BLD AUTO: 3.7 10E9/L (ref 1.6–8.3)
NEUTROPHILS NFR BLD AUTO: 72.4 %
NRBC # BLD AUTO: 0 10*3/UL
NRBC BLD AUTO-RTO: 0 /100
PLATELET # BLD AUTO: 140 10E9/L (ref 150–450)
POTASSIUM SERPL-SCNC: 4 MMOL/L (ref 3.4–5.3)
PROT SERPL-MCNC: 6.8 G/DL (ref 6.8–8.8)
RBC # BLD AUTO: 4.18 10E12/L (ref 4.4–5.9)
SODIUM SERPL-SCNC: 135 MMOL/L (ref 133–144)
SPECIMEN SOURCE: NORMAL
SPECIMEN SOURCE: NORMAL
WBC # BLD AUTO: 5.1 10E9/L (ref 4–11)

## 2021-05-19 PROCEDURE — 250N000011 HC RX IP 250 OP 636: Performed by: NURSE PRACTITIONER

## 2021-05-19 PROCEDURE — 96413 CHEMO IV INFUSION 1 HR: CPT

## 2021-05-19 PROCEDURE — 96367 TX/PROPH/DG ADDL SEQ IV INF: CPT

## 2021-05-19 PROCEDURE — 80053 COMPREHEN METABOLIC PANEL: CPT | Performed by: NURSE PRACTITIONER

## 2021-05-19 PROCEDURE — 258N000003 HC RX IP 258 OP 636: Performed by: NURSE PRACTITIONER

## 2021-05-19 PROCEDURE — 83735 ASSAY OF MAGNESIUM: CPT | Performed by: NURSE PRACTITIONER

## 2021-05-19 PROCEDURE — 96375 TX/PRO/DX INJ NEW DRUG ADDON: CPT

## 2021-05-19 PROCEDURE — 92526 ORAL FUNCTION THERAPY: CPT | Mod: GN | Performed by: SPEECH-LANGUAGE PATHOLOGIST

## 2021-05-19 PROCEDURE — 99215 OFFICE O/P EST HI 40 MIN: CPT | Performed by: NURSE PRACTITIONER

## 2021-05-19 PROCEDURE — 85025 COMPLETE CBC W/AUTO DIFF WBC: CPT | Performed by: NURSE PRACTITIONER

## 2021-05-19 PROCEDURE — 77386 HC IMRT TREATMENT DELIVERY, COMPLEX: CPT | Performed by: RADIOLOGY

## 2021-05-19 RX ORDER — PALONOSETRON 0.05 MG/ML
0.25 INJECTION, SOLUTION INTRAVENOUS ONCE
Status: CANCELLED
Start: 2021-05-19

## 2021-05-19 RX ORDER — LORAZEPAM 2 MG/ML
0.5 INJECTION INTRAMUSCULAR EVERY 4 HOURS PRN
Status: CANCELLED
Start: 2021-05-19

## 2021-05-19 RX ORDER — MEPERIDINE HYDROCHLORIDE 25 MG/ML
25 INJECTION INTRAMUSCULAR; INTRAVENOUS; SUBCUTANEOUS EVERY 30 MIN PRN
Status: CANCELLED | OUTPATIENT
Start: 2021-05-19

## 2021-05-19 RX ORDER — NALOXONE HYDROCHLORIDE 0.4 MG/ML
.1-.4 INJECTION, SOLUTION INTRAMUSCULAR; INTRAVENOUS; SUBCUTANEOUS
Status: CANCELLED | OUTPATIENT
Start: 2021-05-19

## 2021-05-19 RX ORDER — PALONOSETRON 0.05 MG/ML
0.25 INJECTION, SOLUTION INTRAVENOUS ONCE
Status: COMPLETED | OUTPATIENT
Start: 2021-05-19 | End: 2021-05-19

## 2021-05-19 RX ORDER — SODIUM CHLORIDE 9 MG/ML
1000 INJECTION, SOLUTION INTRAVENOUS CONTINUOUS PRN
Status: CANCELLED
Start: 2021-05-19

## 2021-05-19 RX ORDER — METHYLPREDNISOLONE SODIUM SUCCINATE 125 MG/2ML
125 INJECTION, POWDER, LYOPHILIZED, FOR SOLUTION INTRAMUSCULAR; INTRAVENOUS
Status: CANCELLED
Start: 2021-05-19

## 2021-05-19 RX ORDER — EPINEPHRINE 1 MG/ML
0.3 INJECTION, SOLUTION INTRAMUSCULAR; SUBCUTANEOUS EVERY 5 MIN PRN
Status: CANCELLED | OUTPATIENT
Start: 2021-05-19

## 2021-05-19 RX ORDER — HEPARIN SODIUM (PORCINE) LOCK FLUSH IV SOLN 100 UNIT/ML 100 UNIT/ML
5 SOLUTION INTRAVENOUS
Status: CANCELLED | OUTPATIENT
Start: 2021-05-19

## 2021-05-19 RX ORDER — ALBUTEROL SULFATE 0.83 MG/ML
2.5 SOLUTION RESPIRATORY (INHALATION)
Status: CANCELLED | OUTPATIENT
Start: 2021-05-19

## 2021-05-19 RX ORDER — DIPHENHYDRAMINE HYDROCHLORIDE 50 MG/ML
50 INJECTION INTRAMUSCULAR; INTRAVENOUS
Status: CANCELLED
Start: 2021-05-19

## 2021-05-19 RX ORDER — ALBUTEROL SULFATE 90 UG/1
1-2 AEROSOL, METERED RESPIRATORY (INHALATION)
Status: CANCELLED
Start: 2021-05-19

## 2021-05-19 RX ORDER — METHADONE HYDROCHLORIDE 5 MG/1
5 TABLET ORAL EVERY 8 HOURS
Qty: 45 TABLET | Refills: 0 | COMMUNITY
Start: 2021-05-19 | End: 2021-05-24

## 2021-05-19 RX ORDER — HEPARIN SODIUM,PORCINE 10 UNIT/ML
5 VIAL (ML) INTRAVENOUS
Status: CANCELLED | OUTPATIENT
Start: 2021-05-19

## 2021-05-19 RX ADMIN — PALONOSETRON HYDROCHLORIDE 0.25 MG: 0.25 INJECTION, SOLUTION INTRAVENOUS at 09:54

## 2021-05-19 RX ADMIN — MAGNESIUM SULFATE HEPTAHYDRATE: 500 INJECTION, SOLUTION INTRAMUSCULAR; INTRAVENOUS at 09:54

## 2021-05-19 RX ADMIN — DEXAMETHASONE SODIUM PHOSPHATE: 10 INJECTION, SOLUTION INTRAMUSCULAR; INTRAVENOUS at 09:57

## 2021-05-19 RX ADMIN — CISPLATIN 90 MG: 1 INJECTION INTRAVENOUS at 11:05

## 2021-05-19 ASSESSMENT — PAIN SCALES - GENERAL: PAINLEVEL: MODERATE PAIN (4)

## 2021-05-19 NOTE — NURSING NOTE
"Oncology Rooming Note    May 19, 2021 8:06 AM   Quan Murphy is a 70 year old male who presents for:    Chief Complaint   Patient presents with     Blood Draw     Labs drawn via PIV start by RN. VS taken.     Initial Vitals: /86   Pulse 83   Temp 98.8  F (37.1  C) (Oral)   Resp 16   Wt 99.9 kg (220 lb 4.8 oz)   SpO2 97%   BMI 33.01 kg/m   Estimated body mass index is 33.01 kg/m  as calculated from the following:    Height as of 5/14/21: 1.74 m (5' 8.5\").    Weight as of this encounter: 99.9 kg (220 lb 4.8 oz). Body surface area is 2.2 meters squared.  Moderate Pain (4) Comment: Data Unavailable   No LMP for male patient.  Allergies reviewed: Yes  Medications reviewed: Yes    Medications: Medication refills not needed today.  Pharmacy name entered into EPIC:    [facility] [place], [place] - [address] [place] [address] DR GARSIA [facility] - [place], [place] - [address] [place] [address] PHARMACY [place], [place] - [address] [facility] [phone]    Clinical concerns: Pt had some medication changes from palliative care. Leisa was notified.      Priyanka Mckeon CMA              "

## 2021-05-19 NOTE — LETTER
5/19/2021         RE: Quan Murphy  [address] [facility] [address]        Dear Colleague,    Thank you for referring your patient, Quan Murphy, to the [facility]. Please see a copy of my visit note below.    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 Christensen). Path: SCC, non keratinizing, p16 +gardenia  3/23/21 CT neck. 2.9 x 2.7 x 3.5 cm L tonsil fullness involving soft palate, level 2-3 neck nodes  4/2/21 PET/CT. 2.7 x 2.2 x 2.7 cm L palatine tonsil mass (SUV 24), extension to oral cavity, 3.4 x 2.0 cm L level 4 node invading SCM, 1.8 x 1.8 cm L level 2A/3 node (SUV 7.9) w/ECS, 1.4 x 0.9 cm R level 2A node (SUV 6.7)    Concurrent chemoradiation with weekly cisplatin began 4/28/21    SUBJECTIVE  New is taking 10 norco tabs in 24 hours, every 4-5 hours he will take 2 tabs with near good pain control. No alcohol use. As far as nutrition, he is mainly doing liquid/soft intake now with 2 shakes that are high-debby (unsure how many cals) plus pudding, cream of wheat, pancakes, etc. In addition to sipping on Gatorade, he drinks at least 3 water bottles of water per day. Sometimes feels full when he is drinking all these liquids. He is using biotene regularly and doing the salt and soda rinses. He can't recall if he tried MMW but does note the doxepin burned when he tried it. He is doing swallowing exercises. Nausea controlled with compazine TID. Secretions are thicker.    No change in hearing, no neuropathy. No fever or  chills since last Thursday when in ED. No breathing concerns. No other new concerns.      10 point review of systems otherwise negative    CURRENT OUTPATIENT MEDICATIONS  Current Outpatient Medications   Medication Sig Dispense Refill     ALPRAZolam (XANAX) 0.5 MG tablet TAKE 1 TABLET (0.5 MG) BY MOUTH 4 TIMES DAILY AS NEEDED FOR ANXIETY 120 tablet 1     ASPIRIN ADULT LOW STRENGTH 81 MG EC tablet TAKE ONE TABLET BY MOUTH ONCE DAILY 90 tablet 1     citalopram (CELEXA) 40 MG tablet TAKE ONE TABLET BY MOUTH ONCE DAILY 30 tablet 8     doxepin (SINEQUAN) 10 MG/ML (HIGH CONC) solution Mouth pain: take 1.5 mL mixed with equal amount tap water. Swish for 60 sec then spit. Take every 4 h as needed. Sleep: take 0.6 mL at night. May repeat this once more overnight. Do not take more than 2 xanax overnight. 118 mL 0     HYDROcodone-acetaminophen (NORCO)  MG per tablet Take 1-2 tablets by mouth every 4 hours as needed for severe pain (No more than 10 a day. Stop dilaudid.) 150 tablet 0     HYDROmorphone (DILAUDID) 2 MG tablet Take 1-2 tablets (2-4 mg) by mouth every 4 hours as needed for moderate to severe pain 36 tablet 0     hydrOXYzine (VISTARIL) 25 MG capsule TAKE 1 CAPSULE (25 MG) BY MOUTH 4 TIMES DAILY AS NEEDED FOR ANXIETY 120 capsule 3     magic mouthwash (ENTER INGREDIENTS IN COMMENTS) suspension Take 10 mLs by mouth every 4 hours as needed (pain) Swish and spit 200 mL 1     methadone (DOLOPHINE) 5 MG tablet Take 0.5 tablets (2.5 mg) by mouth every 8 hours 45 tablet 0     metoprolol succinate ER (TOPROL-XL) 50 MG 24 hr tablet Take 1 tablet (50 mg) by mouth daily 90 tablet 3     nitroGLYcerin (NITROSTAT) 0.4 MG sublingual tablet For chest pain place 1 tablet under the tongue every 5 minutes for 3 doses. If symptoms persist 5 minutes after 1st dose call 911. 25 tablet 0     omeprazole (PRILOSEC) 40 MG DR capsule Take 1 capsule (40 mg) by mouth daily 90 capsule 3     ondansetron (ZOFRAN-ODT) 8 MG ODT tab Take 1 tablet  (8 mg) by mouth every 8 hours as needed for nausea 30 tablet 11     prochlorperazine (COMPAZINE) 10 MG tablet Take 0.5 tablets (5 mg) by mouth every 6 hours as needed (Nausea/Vomiting) 30 tablet 11     rosuvastatin (CRESTOR) 40 MG tablet Take 1 tablet (40 mg) by mouth daily 90 tablet 1     testosterone (ANDROGEL 1.62 % PUMP) 20.25 MG/ACT gel testosterone 20.25 mg/1.25 gram (1.62 %) transdermal gel pump       zolpidem (AMBIEN) 10 MG tablet TAKE ONE TABLET BY MOUTH EVERY EVENING AS NEEDED FOR SLEEP 30 tablet 5     PHYSICAL EXAM  /86   Pulse 83   Temp 98.8  F (37.1  C) (Oral)   Resp 16   Wt 99.9 kg (220 lb 4.8 oz)   SpO2 97%   BMI 33.01 kg/m    Wt Readings from Last 10 Encounters:   05/14/21 103.5 kg (228 lb 3.2 oz)   05/13/21 103 kg (227 lb)   05/12/21 101.3 kg (223 lb 6.4 oz)   05/06/21 103.4 kg (228 lb)   05/05/21 102.2 kg (225 lb 4.8 oz)   04/29/21 105.7 kg (233 lb)   04/28/21 102.8 kg (226 lb 9.6 oz)   04/02/21 102 kg (224 lb 13.9 oz)   03/22/21 101.1 kg (222 lb 12.5 oz)   03/04/21 98.9 kg (218 lb)   General: The patient is a pleasant male in no acute distress.  HEENT: EOMI, PERRL. Sclerae are anicteric. Oral mucosa is pink, diffuse mucositis.   Lymph: Neck is supple with Level 3 left node about 2 cm in size palpable in the neck. level 2 node less palpable today, no other lymphadenopathy in the cervical or supraclavicular areas.   Heart: Regular rate and rhythm.   Lungs: Clear to auscultation bilaterally.   Abdomen: Bowel sounds present, soft, nontender with no palpable hepatosplenomegaly or masses.   Extremities: No lower extremity edema noted bilaterally.   Neuro: Cranial nerves II through XII are grossly intact.  Skin: No rashes, petechiae, or bruising noted on exposed skin.    LABORATORY AND IMAGING STUDIES     Ref. Range 5/19/2021 07:54   Sodium Latest Ref Range: 133 - 144 mmol/L 135   Potassium Latest Ref Range: 3.4 - 5.3 mmol/L 4.0   Chloride Latest Ref Range: 94 - 109 mmol/L 102   Carbon  Dioxide Latest Ref Range: 20 - 32 mmol/L 28   Urea Nitrogen Latest Ref Range: 7 - 30 mg/dL 23   Creatinine Latest Ref Range: 0.66 - 1.25 mg/dL 1.16   GFR Estimate Latest Ref Range: >60 mL/min/1.73_m2 64   GFR Estimate If Black Latest Ref Range: >60 mL/min/1.73_m2 74   Calcium Latest Ref Range: 8.5 - 10.1 mg/dL 8.9   Anion Gap Latest Ref Range: 3 - 14 mmol/L 4   Magnesium Latest Ref Range: 1.6 - 2.3 mg/dL 1.9   Albumin Latest Ref Range: 3.4 - 5.0 g/dL 3.0 (L)   Protein Total Latest Ref Range: 6.8 - 8.8 g/dL 6.8   Bilirubin Total Latest Ref Range: 0.2 - 1.3 mg/dL 0.4   Alkaline Phosphatase Latest Ref Range: 40 - 150 U/L 69   ALT Latest Ref Range: 0 - 70 U/L 22   AST Latest Ref Range: 0 - 45 U/L 17   Glucose Latest Ref Range: 70 - 99 mg/dL 106 (H)   WBC Latest Ref Range: 4.0 - 11.0 10e9/L 5.1   Hemoglobin Latest Ref Range: 13.3 - 17.7 g/dL 11.7 (L)   Hematocrit Latest Ref Range: 40.0 - 53.0 % 36.2 (L)   Platelet Count Latest Ref Range: 150 - 450 10e9/L 140 (L)   RBC Count Latest Ref Range: 4.4 - 5.9 10e12/L 4.18 (L)   MCV Latest Ref Range: 78 - 100 fl 87   MCH Latest Ref Range: 26.5 - 33.0 pg 28.0   MCHC Latest Ref Range: 31.5 - 36.5 g/dL 32.3   RDW Latest Ref Range: 10.0 - 15.0 % 13.9   Diff Method Unknown Automated Method   % Neutrophils Latest Units: % 72.4   % Lymphocytes Latest Units: % 10.6   % Monocytes Latest Units: % 14.6   % Eosinophils Latest Units: % 1.8   % Basophils Latest Units: % 0.4   % Immature Granulocytes Latest Units: % 0.2   Nucleated RBCs Latest Ref Range: 0 /100 0   Absolute Neutrophil Latest Ref Range: 1.6 - 8.3 10e9/L 3.7   Absolute Lymphocytes Latest Ref Range: 0.8 - 5.3 10e9/L 0.5 (L)   Absolute Monocytes Latest Ref Range: 0.0 - 1.3 10e9/L 0.7   Absolute Eosinophils Latest Ref Range: 0.0 - 0.7 10e9/L 0.1   Absolute Basophils Latest Ref Range: 0.0 - 0.2 10e9/L 0.0   Abs Immature Granulocytes Latest Ref Range: 0 - 0.4 10e9/L 0.0   Absolute Nucleated RBC Unknown 0.0       ASSESSMENT AND  PLAN  SCC L tonsil, [id number], p16 +gardenia, ECS +gardenia: began chemotherapy 4/28/21 as a radiosensitizer to radiation with weekly cisplatin, recommended because of CKD with intermittent SURESH. Plan for about 6 doses concurrently with radiation. He is tolerating okay ex some nausea and increase in pain. Kidney function around baseline. Proceed today with week 4.   --follow up weekly, sees Dr. Schrader next week    CKD; baseline looks to be around 1.1: SURESH resolved, Cr better today. FENA supporing pre-renal    Nausea: using compazine TID and can take zofran prn starting saturday. No ativan.     Nutrition: weight down a bit. Talked about increasing shakes from 2 to 3x daily and adding butter/whole milk to cream of wheat in the morning. He meets with Crystal Monday. Offered IVF if he is feeling dehydrated at any point during the week, declined for now but will let us know.     Fever: had LFG last week day after chemo prompting trip to ED. No fevers since and no ifx concerns today. monitor    Acute treatment pain on chronic opioid use: has historically used hydrocodone-acetaminophen 4x daily at baseline for his sinuses, has had longstanding issues with that and has tried many many interventions without success. Pain increasing with treatment as expected. Recently saw Dr. Reza, please see his note for full details but in brief he started New on Norco  with max 10 tabs daily and methadone 2.5 TID. This is keeping New's pain under tolerable control but he asks about increasing methadone to 6x daily from 3 since the 2.5 mg seems to not be helping. Reminded him why he should not self titrate and how we need to direct increases, especially with onset of methadone effect. Confirmed he is not taking dilaudid in addition to the above.   -again discussed tyring MMW prn; particularly good for prior to eating or if needs acute breakthrough relief could try.   --Will send message to Gabriella in follow up of her phone call 5/17 with  New, perhaps methadone can be increased prior to weekend.     Anxiety: Patient has tried a number of medications in the past. Referral placed to psychiatry after one of his initial visits but he cancelled this as he feels it is unnecessary.   --He is currently on the maximum dose of Celexa at 40 mg daily. Upon chart review, it appears he has previously been on Abilify, buspirone, escitalopram, sertraline, and hydroxyzine.  --Also uses alprazolam nightly along with zolpidem for sleep, he was unable to wean down on alprazolam as Dr. Serrano recommended     ASCVD:  See note by Dr. Ch, Cardiology, from 10/20/20. Confirmed he stopped lisinopril and metoprolol increased, but he cannot recall what dose. BP ok today. Monitor trend    Exercise: He can continue going to the gym as he tolerates.     COVID: Has been vaccinated.    Alcohol use: abstaining per last visit with Dr. Serrano     60 minutes spent on the date of the encounter doing chart review, review of test results, interpretation of tests, patient visit, documentation and discussion with other provider(s)     Leisa Zarate CNP on 5/19/2021 at 9:02 AM            Again, thank you for allowing me to participate in the care of your patient.        Sincerely,        Leisa Zarate CNP

## 2021-05-19 NOTE — NURSING NOTE
Chief Complaint   Patient presents with     Blood Draw     Labs drawn via PIV start by RN. VS taken.     Labs drawn with PIV start by rn.  Pt tolerated well.  VS taken and pt checked in for next appt.    Slade Armstrong RN

## 2021-05-19 NOTE — PROGRESS NOTES
Per MD, okay to stick with 4 xanax per night as that is what he has been doing long term. Called and left  for patient advising him of that.

## 2021-05-19 NOTE — PROGRESS NOTES
"Called patient and advised of MD's below recommendation for methadone increase to 5mg Q8H. He verbalized understanding and repeated my instructions back to me.     Reminded him it takes a few days for methadone to get to peak effective level in his body and not to make any changes to his medication on his own. He tells me that Leisa reinforced no med changes on his own today and that he will not. He said he will be honest with us on how he is taking his meds.      He asks for an update on his xanax question - wants to stick with 4 tabs, 2 wasn't enough. Is really worried that without 4 tabs he won't sleep as well and then will be more run down and feels this would be really tough for him since he is already \"comprimised\" with infusion. Advised I would check with the MD on the xanax again. He was in agreement and will wait for my call back. Doesn't want more, just wants to stick with 4 during treatment.  "

## 2021-05-19 NOTE — PROGRESS NOTES
Thanks for your note today Leisa and yours yesterday Gabriella    Now it has been a full 4 days now on methadone.  Increase to 5mg tid    Can you call him KM to let him know? Thanks. And, again, reinforce the idea that he cannot self-adjust/increase his meds. Appreciate it.

## 2021-05-19 NOTE — PROGRESS NOTES
Infusion Nursing Note:  Quan GANT Jeffrey presents today for Cycle 1 Day 22 Cisplatin.    Patient seen by provider today: Yes: Leisa Zarate NP.    present during visit today: Not Applicable.    Note: Patient had swallow therapy visit in infusion. Voiding in large amounts pre during and post cisplatin. Requires no intervention for pain today in infusion.     Intravenous Access:  Peripheral IV placed.    Treatment Conditions:  Lab Results   Component Value Date    HGB 11.7 05/19/2021     Lab Results   Component Value Date    WBC 5.1 05/19/2021      Lab Results   Component Value Date    ANEU 3.7 05/19/2021     Lab Results   Component Value Date     05/19/2021      Lab Results   Component Value Date     05/19/2021                   Lab Results   Component Value Date    POTASSIUM 4.0 05/19/2021           Lab Results   Component Value Date    MAG 1.9 05/19/2021            Lab Results   Component Value Date    CR 1.16 05/19/2021                   Lab Results   Component Value Date    LATRELL 8.9 05/19/2021                Lab Results   Component Value Date    BILITOTAL 0.4 05/19/2021           Lab Results   Component Value Date    ALBUMIN 3.0 05/19/2021                    Lab Results   Component Value Date    ALT 22 05/19/2021           Lab Results   Component Value Date    AST 17 05/19/2021       Results reviewed, labs MET treatment parameters, ok to proceed with treatment.      Post Infusion Assessment:  Patient tolerated infusion without incident.  Blood return noted pre and post infusion.  Access discontinued per protocol.   To radiation following infusion.     Discharge Plan:   Patient declined prescription refills.  AVS to patient via [facility].  Patient will return 5/27 for next appointment.   Patient discharged in stable condition accompanied by: self.  Departure Mode: Ambulatory.  Face to Face time: 0.    Shanthi Magallanes RN

## 2021-05-20 ENCOUNTER — APPOINTMENT (OUTPATIENT)
Dept: RADIATION ONCOLOGY | Facility: CLINIC | Age: 71
End: 2021-05-20
Attending: RADIOLOGY
Payer: MEDICARE

## 2021-05-20 VITALS
WEIGHT: 222 LBS | BODY MASS INDEX: 33.26 KG/M2 | SYSTOLIC BLOOD PRESSURE: 126 MMHG | HEART RATE: 73 BPM | DIASTOLIC BLOOD PRESSURE: 74 MMHG

## 2021-05-20 DIAGNOSIS — C61 MALIGNANT NEOPLASM OF PROSTATE (H): Primary | ICD-10-CM

## 2021-05-20 PROCEDURE — 77386 HC IMRT TREATMENT DELIVERY, COMPLEX: CPT | Performed by: RADIOLOGY

## 2021-05-20 NOTE — PROGRESS NOTES
RADIATION ONCOLOGY WEEKLY ON TREATMENT VISIT   Encounter Date: May 20, 2021    Patient Name: Quan Murphy  MRN: [id number]  : 1950     Disease and Stage: cT2 N2 M0 p16 positive squamous cell carcinoma of the left tonsil  Treatment Site: Oropharynx and bilateral neck  Current Dose/Planned Total Dose: 3604 / 6996 cGy  Daily Fraction Size: 212 cGy/day, 5 times/week  Concurrent Chemotherapy: Yes  Drug and Frequency: Cisplatin (40 mg/m ) weekly    Treatment Summary:    2021: Initiation of radiotherapy. Cycle 1, day 1 of weekly cisplatin.    2021: Weekly RT visit. Current dose of 424 cGy. Tolerating treatment well. No issues.    2021: Cycle 1, day 8 of weekly cisplatin.    2021: Weekly RT visit. Current dose of 1484 cGy. Tolerating treatment well.    2021: Cycle 1, day 15 of weekly cisplatin    2021: Weekly RT visit. Current dose of 2544 cGy. Moderate oropharyngeal pain.    2021: Cycle one, day 22 of weekly cisplatin    2021: Weekly RT visit. Current dose of 3604 cGy. Moderate to severe odynophagia. 2 kg weight loss over the past week.    ED visits/Hospitalizations:  1. 2021: Presentation to ED with low-grade fevers (99.6-100.1  F) and tachycardia to the low 100-120s. Work-up negative. Symptoms improved with IV fluid support and pain medication.    Missed Treatments:  None    Subjective: Mr. Murphy presents to clinic today for his weekly on-treatment visit. He was seen by palliative care on 2021 where his pain medication regimen was reviewed and adjusted, namely discontinuation of Dilaudid with initiation of methadone and continuation of as-needed hydrocodone for breakthrough symptoms. He currently describes his pain as a 7-9/10 in severity although is satisfied with his current analgesic regimen. He continues to take a small amount of solid food with his diet predominantly puréed/liquid in nature. His weight is down approximately 2 kg over the past week.    ROS:    Constitutional  Pain (0-10): 7 (mouth), 9 (throat), 6 (skin)  Fatigue: Moderate    CNS  Headaches: None    ENT  Mucositis: Moderate to severe    Cardiopulmonary  Dyspnea: None    GI  Nausea/vomiting: Mild nausea without vomiting    Nutrition  PEG: No  Diet: Puréed/liquid    Integumentary  Dermatitis: Moderate    Objective:   Current weight: 100.7 kg  Last week's weight: 103.0 kg  Starting weight: 105.7 kg    BP: 126/74 (sitting), 130/70 (standing)  Pulse: 73 (sitting), 75 (standing)     General: Mildly fatigued-appearing 70-year-old gentleman seated comfortably in an examination chair no acute distress  HEENT: NC/AT.  EOMI.  No rhinorrhea or epistaxis.  Mildly dry mucous membranes with thickened oral cavity secretions.  Confluent mucositis involving the left posterior lateral oral tongue, left tonsillar fossa, left soft palate and posterior oropharyngeal wall.  No thrush.  Pulmonary: No wheezing, stridor or respiratory distress   Skin: Moderate erythema of the left lower face and bilateral neck.  No desquamation    Treatment-related toxicities (CTCAE v5.0):    Mucositis: Grade 2    Dermatitis: Grade 2    Assessment:    Mr. 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 He is receiving curative-intent concurrent chemoradiation with weekly cisplatin. He continues to have the anticipated acute radiation-induced mucositis and dermatitis.    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:    Continue chemoradiotherapy    Pain management:    Continue pain control regimen as delineated by the palliative care service with methadone for long-acting pain control and as needed Norco for moderate to severe breakthrough symptoms    Fluids/Electrolytes/Nutrition:    Continue diet as tolerated with caloric goals as recommended by the oncology dietitian    Dermatitis:    Continue twice daily Aquaphor use to the lower face bilateral  neck    Mucositis:    Pain control as described above    Continue frequent salt/soda rinses    Anxiety:    Continue Celexa    Mosaiq chart and setup information reviewed  IGRT images reviewed    Medication list reviewed    Ahmet Robbins MD/PhD    Dept of Radiation Oncology  [facility]

## 2021-05-21 ENCOUNTER — APPOINTMENT (OUTPATIENT)
Dept: RADIATION ONCOLOGY | Facility: CLINIC | Age: 71
End: 2021-05-21
Attending: RADIOLOGY
Payer: MEDICARE

## 2021-05-21 PROCEDURE — 77386 HC IMRT TREATMENT DELIVERY, COMPLEX: CPT | Performed by: RADIOLOGY

## 2021-05-21 PROCEDURE — 77014 PR CT GUIDE FOR PLACEMENT RADIATION THERAPY FIELDS: CPT | Mod: 26 | Performed by: RADIOLOGY

## 2021-05-24 ENCOUNTER — HOSPITAL ENCOUNTER (OUTPATIENT)
Dept: NUTRITION | Facility: CLINIC | Age: 71
Discharge: HOME | End: 2021-05-24
Attending: RADIOLOGY | Admitting: RADIOLOGY
Payer: MEDICARE

## 2021-05-24 ENCOUNTER — PATIENT OUTREACH (OUTPATIENT)
Dept: PALLIATIVE CARE | Facility: CLINIC | Age: 71
End: 2021-05-24

## 2021-05-24 ENCOUNTER — TELEPHONE (OUTPATIENT)
Dept: ONCOLOGY | Facility: CLINIC | Age: 71
End: 2021-05-24

## 2021-05-24 ENCOUNTER — APPOINTMENT (OUTPATIENT)
Dept: RADIATION ONCOLOGY | Facility: CLINIC | Age: 71
End: 2021-05-24
Attending: RADIOLOGY
Payer: MEDICARE

## 2021-05-24 DIAGNOSIS — G89.3 NEOPLASM RELATED PAIN: ICD-10-CM

## 2021-05-24 DIAGNOSIS — C09.9 SQUAMOUS CELL CARCINOMA OF LEFT TONSIL (H): ICD-10-CM

## 2021-05-24 PROCEDURE — 77386 HC IMRT TREATMENT DELIVERY, COMPLEX: CPT | Performed by: RADIOLOGY

## 2021-05-24 PROCEDURE — 97803 MED NUTRITION INDIV SUBSEQ: CPT | Mod: TEL,GZ | Performed by: DIETITIAN, REGISTERED

## 2021-05-24 RX ORDER — HYDROCODONE BITARTRATE AND ACETAMINOPHEN 10; 325 MG/1; MG/1
1-2 TABLET ORAL EVERY 4 HOURS PRN
Qty: 150 TABLET | Refills: 0 | Status: SHIPPED | OUTPATIENT
Start: 2021-05-24 | End: 2021-06-21

## 2021-05-24 RX ORDER — METHADONE HYDROCHLORIDE 5 MG/1
5 TABLET ORAL 4 TIMES DAILY
Qty: 45 TABLET | Refills: 0 | COMMUNITY
Start: 2021-05-24 | End: 2021-05-24

## 2021-05-24 NOTE — TELEPHONE ENCOUNTER
PA Initiation    Medication: Hydrocodone-Acetaminophen - Submitted  Insurance Company: [employer] - Phone [phone] Fax [phone]  Pharmacy Filling the Rx:    Filling Pharmacy Phone:    Filling Pharmacy Fax:    Start Date: 5/24/2021        Samantha Franocis CPhT  [facility]  Oncology Pharmacy Liaison  [email or web address]  Phone: [phone]  Fax: [phone]

## 2021-05-24 NOTE — TELEPHONE ENCOUNTER
Prior Authorization Approval    Authorization Effective Date: 5/24/2021  Authorization Expiration Date: 5/24/2022  Medication: Hydrocodone-Acetaminophen - APPROVED  Approved Dose/Quantity:   Reference #:     Insurance Company: [employer] - Phone [phone] Fax [phone]  Expected CoPay:       CoPay Card Available:      Foundation Assistance Needed:    Which Pharmacy is filling the prescription (Not needed for infusion/clinic administered):    Pharmacy Notified:    Patient Notified:          Samantha Francois CPhT  [facility]  Oncology Pharmacy Liaison  [email or web address]  Phone: [phone]  Fax: [phone]

## 2021-05-24 NOTE — PROGRESS NOTES
"The patient has been notified of the following:      \"We have found that certain health care needs can be provided without the need for a face to face visit.  This service lets us provide the care you need with a phone conversation.       I will have full access to your [facility] medical record during this entire phone call.   I will be taking notes for your medical record.      Since this is like an office visit, we will bill your insurance company for this service.       There are potential benefits and risks of telephone visits (e.g. limits to patient confidentiality) that differ from in-person visits.?  Confidentiality still applies for telephone services, and nobody will record the visit.  It is important to be in a quiet, private space that is free of distractions (including cell phone or other devices) during the visit.??      If during the course of the call I believe a telephone visit is not appropriate, you will not be charged for this service\"     Consent has been obtained for this service by care team member: Yes       CLINICAL NUTRITION SERVICES - REASSESSMENT NOTE   EVALUATION OF PREVIOUS PLAN OF CARE:   Referring Physician: Itzel  Time spent with patient: 15 minutes.     Current diet/appetite: Good - general diet  Chemotherapy: Starting 4/28 - LD Cisplatin  Radiation: Starting 4/28 (~19/33 fractions completed)  Accompanied by: his wife, Mounika    Monitoring from previous assessment:   -Food intake - New tells me that eating remains very difficult due to odynophagia and dysgeusia.  He is relying mostly on [employer] and [employer] shakes.  He mixed 1340 bulk with whole milk, core power and ice cream.   He is striving for 2-3 shakes /day which is equal to >2800cal and 100 g protein/day.     -Fluid/beverage intake - fluids have also become more difficult.  He continues to strive for 2 cups electrolyte fluids and >6 cups water/day    -Weight trends -   Wt Readings from Last 6 Encounters:   05/20/21 100.7 kg (222 " lb)   05/19/21 99.9 kg (220 lb 4.8 oz)   05/14/21 103.5 kg (228 lb 3.2 oz)   05/13/21 103 kg (227 lb)   05/12/21 101.3 kg (223 lb 6.4 oz)   05/06/21 103.4 kg (228 lb)     Previous Goals:   1.  Aim for 5-6 small frequent meals  2.  Aim for 2400kcal and 100g protein/day  3. Weight maintenance       Evaluation: Not met   Previous Nutrition Diagnosis:   Predicted inadequate nutrient intake related to cancer treatment to head/neck region   Evaluation: Declining   NEW FINDINGS:   Weight loss of 6 lb x 1 week   CURRENT NUTRITION DIAGNOSIS   Inadequate oral intake related to odynophagia, mucositis and dysgeusia as evidenced by 6 lb wt loss x past one week.    INTERVENTIONS   Composition of Meals and Snacks - Encouraged to continue making home made shakes using Bulk 1340 powder and having 2-3 times/day.   Encouraged to try soft cereals such as cream of wheat.   Reviewed calorie, protein and fluid needs (2400kcal, 100g protein, >10 cups non-caffeine containing bevg)      Goals  1.  Aim for 2-3 Bulk 1340 shakes/day  2.  Aim for >10 cups water/electrolyte fluids/day  3. Weight maintenance      Follow-Up Plans: Pt has RD contact information for questions.  Scheduled for follow-up in one week, June 1st at 9:30 - message sent to MG scheduling (cancer center)     MONITORING AND EVALUATION:  -Food/beverage intake  -Weight trends     Crystal Ramos, DARLENE, LD

## 2021-05-24 NOTE — PROGRESS NOTES
Per MD - okay to increase methadone to 5mg QID. Also refill for norco completed.     Called patient to discuss and verify how many methadone he has left, but unable to reach him. Left VM asking for call back.

## 2021-05-24 NOTE — PROGRESS NOTES
Received call back - advised okay to increase methadone to 5mg QID. He was very grateful to hear of that okay. Discussed norco needs to remain at #10 tabs total per day.     He advised he has #11 methadone tabs left. Advised I would request the refill of that as well.     Last refill: 5/14/2021

## 2021-05-24 NOTE — PROGRESS NOTES
"Called patient to check in about methadone increase - he tells me that he feels like it has \"helped tremendously, but not sure we are there yet.\" He says someone told him \"the worst is yet to come\" and he has found that to be true. Pain is more intense.     He does think that methadone is helpful, but says he would like to be able to take 1 more tab during the day. Doesn't feel it lasts 8 hours, more like 6 hours.   Has still been taking #10 total norco daily. Was really trying to space out the norco and the methadone. Taking norco early then methadone mid morning. We talked about this at length - advised take the methadone scheduled consistently and okay if norco happens to be at the same time, doesn't need to time the two of them together. He verbalized understanding, says he got confused as he was told not to take dilaudid and norco together - confirmed again he should not be taking any dilaudid right now, just the norco. We discussed that both of those meds are short acting opiates and he should be on only one at a time. He verbalized understanding.     Says he has been logging when he is taking his pain meds, but not at home with log. Is sure he is taking #10 norco in 24 hours. Would really like 1 more dose of methadone during the day.     No side effects - BMs every other day with stool softeners.     He tells me that weekends are the worst, thinks that is when his chemo side effects kick in.     Further states he \"can't emphasize enough how much the methadone is helping\" and that he thinks one more tab would just right for him.     He notes if he was able to just lay around and not do his swallowing exercises or eat/drink then he would be at a tolerable pain level, but anything touching his throat is almost unbearable.     He also asks about refills, again not a home though. We reviewed dates and he thinks he has enough norco to make it to Thursday and beginning of Friday potentially. Will count methadone " when he gets home - wants me to guarantee a script on Friday for him as he doesn't want to run out over the weekend. Reassured him we also don't want him to run out over the weekend and then we could update the script and get that sent in.     He is aware of apt Friday morning. He will call me when he gets home with a count of how much methadone he has left.    He also asks if we will take over prescribing ambien and xanax - PCP was prescribing but is on leave and he says they have had trouble in the past with covering MDs. Does not need refills of that now, just wants to know what to do in the future.    Last refill norco: 5/14/2021  Last office visit: 5/14/2021  Scheduled for follow up 4/28/2021    [place]  Reviewed.

## 2021-05-25 ENCOUNTER — APPOINTMENT (OUTPATIENT)
Dept: RADIATION ONCOLOGY | Facility: CLINIC | Age: 71
End: 2021-05-25
Attending: RADIOLOGY
Payer: MEDICARE

## 2021-05-25 PROCEDURE — 77386 HC IMRT TREATMENT DELIVERY, COMPLEX: CPT | Performed by: RADIOLOGY

## 2021-05-25 PROCEDURE — 77336 RADIATION PHYSICS CONSULT: CPT | Performed by: RADIOLOGY

## 2021-05-25 PROCEDURE — 77014 PR CT GUIDE FOR PLACEMENT RADIATION THERAPY FIELDS: CPT | Mod: 26 | Performed by: RADIOLOGY

## 2021-05-25 PROCEDURE — 77427 RADIATION TX MANAGEMENT X5: CPT | Performed by: RADIOLOGY

## 2021-05-25 RX ORDER — METHADONE HYDROCHLORIDE 5 MG/1
5 TABLET ORAL 4 TIMES DAILY
Qty: 120 TABLET | Refills: 0 | Status: SHIPPED | OUTPATIENT
Start: 2021-05-25 | End: 2021-06-01

## 2021-05-26 ENCOUNTER — APPOINTMENT (OUTPATIENT)
Dept: LAB | Facility: CLINIC | Age: 71
End: 2021-05-26
Attending: INTERNAL MEDICINE
Payer: MEDICARE

## 2021-05-26 ENCOUNTER — ONCOLOGY VISIT (OUTPATIENT)
Dept: ONCOLOGY | Facility: CLINIC | Age: 71
End: 2021-05-26
Attending: INTERNAL MEDICINE
Payer: MEDICARE

## 2021-05-26 ENCOUNTER — APPOINTMENT (OUTPATIENT)
Dept: RADIATION ONCOLOGY | Facility: CLINIC | Age: 71
End: 2021-05-26
Attending: RADIOLOGY
Payer: MEDICARE

## 2021-05-26 VITALS
SYSTOLIC BLOOD PRESSURE: 117 MMHG | DIASTOLIC BLOOD PRESSURE: 78 MMHG | RESPIRATION RATE: 18 BRPM | HEART RATE: 90 BPM | HEIGHT: 69 IN | TEMPERATURE: 98.3 F | BODY MASS INDEX: 31.43 KG/M2 | OXYGEN SATURATION: 97 % | WEIGHT: 212.2 LBS

## 2021-05-26 DIAGNOSIS — K12.1 ORAL ULCER: ICD-10-CM

## 2021-05-26 DIAGNOSIS — F41.9 ANXIETY: ICD-10-CM

## 2021-05-26 DIAGNOSIS — Z91.89 AT RISK FOR PROLONGED QT INTERVAL SYNDROME: ICD-10-CM

## 2021-05-26 DIAGNOSIS — E83.42 HYPOMAGNESEMIA: ICD-10-CM

## 2021-05-26 DIAGNOSIS — R50.9 FEVER AND CHILLS: ICD-10-CM

## 2021-05-26 DIAGNOSIS — E44.0 MODERATE PROTEIN-CALORIE MALNUTRITION (H): ICD-10-CM

## 2021-05-26 DIAGNOSIS — C09.9 SQUAMOUS CELL CARCINOMA OF LEFT TONSIL (H): Primary | ICD-10-CM

## 2021-05-26 LAB
ALBUMIN SERPL-MCNC: 2.8 G/DL (ref 3.4–5)
ALP SERPL-CCNC: 58 U/L (ref 40–150)
ALT SERPL W P-5'-P-CCNC: 25 U/L (ref 0–70)
ANION GAP SERPL CALCULATED.3IONS-SCNC: 9 MMOL/L (ref 3–14)
AST SERPL W P-5'-P-CCNC: 36 U/L (ref 0–45)
BASOPHILS # BLD AUTO: 0 10E9/L (ref 0–0.2)
BASOPHILS NFR BLD AUTO: 0.4 %
BILIRUB SERPL-MCNC: 0.5 MG/DL (ref 0.2–1.3)
BUN SERPL-MCNC: 27 MG/DL (ref 7–30)
CALCIUM SERPL-MCNC: 8.6 MG/DL (ref 8.5–10.1)
CHLORIDE SERPL-SCNC: 101 MMOL/L (ref 94–109)
CO2 SERPL-SCNC: 28 MMOL/L (ref 20–32)
CREAT SERPL-MCNC: 1.33 MG/DL (ref 0.66–1.25)
DIFFERENTIAL METHOD BLD: ABNORMAL
EOSINOPHIL # BLD AUTO: 0 10E9/L (ref 0–0.7)
EOSINOPHIL NFR BLD AUTO: 0.4 %
ERYTHROCYTE [DISTWIDTH] IN BLOOD BY AUTOMATED COUNT: 14.6 % (ref 10–15)
GFR SERPL CREATININE-BSD FRML MDRD: 54 ML/MIN/{1.73_M2}
GLUCOSE SERPL-MCNC: 93 MG/DL (ref 70–99)
HCT VFR BLD AUTO: 33.3 % (ref 40–53)
HGB BLD-MCNC: 10.7 G/DL (ref 13.3–17.7)
IMM GRANULOCYTES # BLD: 0 10E9/L (ref 0–0.4)
IMM GRANULOCYTES NFR BLD: 0.4 %
LYMPHOCYTES # BLD AUTO: 0.2 10E9/L (ref 0.8–5.3)
LYMPHOCYTES NFR BLD AUTO: 8.3 %
MAGNESIUM SERPL-MCNC: 1.8 MG/DL (ref 1.6–2.3)
MCH RBC QN AUTO: 28.7 PG (ref 26.5–33)
MCHC RBC AUTO-ENTMCNC: 32.1 G/DL (ref 31.5–36.5)
MCV RBC AUTO: 89 FL (ref 78–100)
MONOCYTES # BLD AUTO: 0.5 10E9/L (ref 0–1.3)
MONOCYTES NFR BLD AUTO: 19 %
NEUTROPHILS # BLD AUTO: 1.8 10E9/L (ref 1.6–8.3)
NEUTROPHILS NFR BLD AUTO: 71.5 %
NRBC # BLD AUTO: 0 10*3/UL
NRBC BLD AUTO-RTO: 0 /100
PLATELET # BLD AUTO: 129 10E9/L (ref 150–450)
POTASSIUM SERPL-SCNC: 4.4 MMOL/L (ref 3.4–5.3)
PROT SERPL-MCNC: 6.8 G/DL (ref 6.8–8.8)
RBC # BLD AUTO: 3.73 10E12/L (ref 4.4–5.9)
SODIUM SERPL-SCNC: 138 MMOL/L (ref 133–144)
WBC # BLD AUTO: 2.5 10E9/L (ref 4–11)

## 2021-05-26 PROCEDURE — 83735 ASSAY OF MAGNESIUM: CPT | Performed by: INTERNAL MEDICINE

## 2021-05-26 PROCEDURE — 99215 OFFICE O/P EST HI 40 MIN: CPT | Performed by: INTERNAL MEDICINE

## 2021-05-26 PROCEDURE — 36415 COLL VENOUS BLD VENIPUNCTURE: CPT

## 2021-05-26 PROCEDURE — 80053 COMPREHEN METABOLIC PANEL: CPT | Performed by: INTERNAL MEDICINE

## 2021-05-26 PROCEDURE — 85025 COMPLETE CBC W/AUTO DIFF WBC: CPT | Performed by: INTERNAL MEDICINE

## 2021-05-26 PROCEDURE — 77386 HC IMRT TREATMENT DELIVERY, COMPLEX: CPT | Performed by: RADIOLOGY

## 2021-05-26 PROCEDURE — 87529 HSV DNA AMP PROBE: CPT | Mod: 59 | Performed by: INTERNAL MEDICINE

## 2021-05-26 RX ORDER — PALONOSETRON 0.05 MG/ML
0.25 INJECTION, SOLUTION INTRAVENOUS ONCE
Status: CANCELLED
Start: 2021-05-26

## 2021-05-26 RX ORDER — SODIUM CHLORIDE 9 MG/ML
1000 INJECTION, SOLUTION INTRAVENOUS CONTINUOUS PRN
Status: CANCELLED
Start: 2021-05-26

## 2021-05-26 RX ORDER — EPINEPHRINE 1 MG/ML
0.3 INJECTION, SOLUTION INTRAMUSCULAR; SUBCUTANEOUS EVERY 5 MIN PRN
Status: CANCELLED | OUTPATIENT
Start: 2021-05-26

## 2021-05-26 RX ORDER — ALPRAZOLAM 0.5 MG
0.5 TABLET ORAL 4 TIMES DAILY PRN
Qty: 120 TABLET | Refills: 0 | Status: ON HOLD | OUTPATIENT
Start: 2021-05-26 | End: 2021-06-09

## 2021-05-26 RX ORDER — ALBUTEROL SULFATE 90 UG/1
1-2 AEROSOL, METERED RESPIRATORY (INHALATION)
Status: CANCELLED
Start: 2021-05-26

## 2021-05-26 RX ORDER — HEPARIN SODIUM,PORCINE 10 UNIT/ML
5 VIAL (ML) INTRAVENOUS
Status: CANCELLED | OUTPATIENT
Start: 2021-05-26

## 2021-05-26 RX ORDER — METHYLPREDNISOLONE SODIUM SUCCINATE 125 MG/2ML
125 INJECTION, POWDER, LYOPHILIZED, FOR SOLUTION INTRAMUSCULAR; INTRAVENOUS
Status: CANCELLED
Start: 2021-05-26

## 2021-05-26 RX ORDER — HEPARIN SODIUM (PORCINE) LOCK FLUSH IV SOLN 100 UNIT/ML 100 UNIT/ML
5 SOLUTION INTRAVENOUS
Status: CANCELLED | OUTPATIENT
Start: 2021-05-26

## 2021-05-26 RX ORDER — LORAZEPAM 2 MG/ML
0.5 INJECTION INTRAMUSCULAR EVERY 4 HOURS PRN
Status: CANCELLED
Start: 2021-05-26

## 2021-05-26 RX ORDER — ALBUTEROL SULFATE 0.83 MG/ML
2.5 SOLUTION RESPIRATORY (INHALATION)
Status: CANCELLED | OUTPATIENT
Start: 2021-05-26

## 2021-05-26 RX ORDER — MEPERIDINE HYDROCHLORIDE 25 MG/ML
25 INJECTION INTRAMUSCULAR; INTRAVENOUS; SUBCUTANEOUS EVERY 30 MIN PRN
Status: CANCELLED | OUTPATIENT
Start: 2021-05-26

## 2021-05-26 RX ORDER — NALOXONE HYDROCHLORIDE 0.4 MG/ML
.1-.4 INJECTION, SOLUTION INTRAMUSCULAR; INTRAVENOUS; SUBCUTANEOUS
Status: CANCELLED | OUTPATIENT
Start: 2021-05-26

## 2021-05-26 RX ORDER — DIPHENHYDRAMINE HYDROCHLORIDE 50 MG/ML
50 INJECTION INTRAMUSCULAR; INTRAVENOUS
Status: CANCELLED
Start: 2021-05-26

## 2021-05-26 ASSESSMENT — MIFFLIN-ST. JEOR: SCORE: 1709.97

## 2021-05-26 ASSESSMENT — PAIN SCALES - GENERAL: PAINLEVEL: MODERATE PAIN (5)

## 2021-05-26 NOTE — LETTER
5/26/2021         RE: Quan Murpyh  [address]  [facility] [address]        Dear Colleague,    Thank you for referring your patient, Quan Murphy, to the [facility]. Please see a copy of my visit note below.    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 Christensen). Path: SCC, non keratinizing, p16 +gardenia  3/23/21 CT neck. 2.9 x 2.7 x 3.5 cm L tonsil fullness involving soft palate, level 2-3 neck nodes  4/2/21 PET/CT. 2.7 x 2.2 x 2.7 cm L palatine tonsil mass (SUV 24), extension to oral cavity, 3.4 x 2.0 cm L level 4 node invading SCM, 1.8 x 1.8 cm L level 2A/3 node (SUV 7.9) w/ECS, 1.4 x 0.9 cm R level 2A node (SUV 6.7)  4/28~current  Chemoradiation with weekly cisplatin. No HD cisplatin due to CKD.  5/21/21 [facility] ED for fever to 102. No localizing source, cultures negative, not neutropenic    ASSESSMENT AND PLAN  SCC L tonsil, [id number], p16 +gardenia, ECS +gardenia: Currently in week 5 of chemoradiation. Having the expected toxicities. Got quite difficult in the last few days. Continue treatment. No neuropathy. Might be having very slight tinnitus.    Malnutrition, weight loss: Has lost about 15 pounds, with about 10 pounds in the last week alone. He did ok with oral intake yesterday but has essentially eaten nothing today. Seeing we still have 3 weeks of radiation left, I think he needs a Gtube. He was reluctant to get one upfront, and is really disappointed about that now, but discussed the rationale and  negative impact of malnutrition. Will start working on getting that scheduled at the  with thoracic surgery    Mucositis: Unlikely, but will check HSV PCR just in case.     Fever, chills: Having intermittent temps to the 99 range mostly a few days after cisplatin. No other symptoms to suggest infection. HSV swab as above. Blood culture and UA/UC tomorrow.     Secretions: He's having a hard time while getting radiation. Unfortunately, has issues from BPH already and we don't have drugs that do not have a risk of causing worsening urinary retention.      Constipation: Taking senna + colace 2 tabs of both BID and having trouble. Asked him to add miralax, combine the senna + colace into one, and take milk of magnesia prn.    Anticipated acute on chronic opioid use: Working with Dr. Serrano on this; methadone increased this week and will start the 5 mg QID today or tomorrow. Will get EKG tomorrow to look at QTc.    CKD: Creatinine stable    Anxiety, depression: Will ask Dr. Serrano if he can recommend an antidepressant to get us through the acute period.     ASCVD:  See note by Dr. Ch, Cardiology, from 10/20/20. Might need to add metoprolol if his BP is consistently >140/90. Would also decrease lisinopril if creatinine is a problem.    Review of the result(s) of each unique test - CMP, CBC pd    40 minutes spent on the date of the encounter doing chart review, history and exam, documentation and further activities per the note     Shanthi Schrader MD  Associate Professor of Medicine  Hematology, Oncology and Transplantation      YENNY Wolff returns today for follow up of tonsil cancer, just starting week 5 of chemoradiation. Having more trouble eating since last week. Yesterday had half egg, cream of wheat, sausage x 2, 1 malt (1340 calories - made with KEYUR powder, milk, ice protein). Hasn't really had anything today (now mid-afternoon). Have some issues with constipation. Taking senna-S 2 tabs BID. Says he  spikes temps a few days after each chemo and has occasional chills.   No hearing loss. Maybe a tiny bit of tinnitus  No other numbness/tingling  For mucositis, he's going to increase methadone to 5 mg q8h - plans to pick it up today. Is very worried about running out of prn hydrocodone-acetaminophen.  No other new problems.     PAST MEDICAL HISTORY  SCC as above  ASCVD. Angina in 12/2016. [facility] with proximal LAD stenosis, s/p PTCA and stent. Not on metoprolol due to hypotension. Ses Dr. Aramis Ch, Cardiologist.  CKD baseline ~ 1.2-1.3, up to 1.93 on 11/2/20  HTN  Dyslipidemia  H/o prostate ca s/p robotic prostatectomy about 16 years ago followed by Dr. Meir Torres at [facility] in [place]  Nephrolithasis  Anxiety, insomnia. Takes zolpidem and alprazolam nightly   OA  Chronic sinusitus. Takes chronic hydrocodone-acetaminophen for this  Inguinal hernia  GERD, hiatal hernia. Met with Dr. Adams 2/18/20, symptoms not due to hiatal hernia  ORIF R radius 2017  H/o retroperitoneal hematoma 10/2017. Admitted to [facility]. [place] to be related to lithotripsy 10 days prior  Hemorrhoids  PAD s/p R iliac artery stent after injury during [facility]    CURRENT OUTPATIENT MEDICATIONS  Current Outpatient Medications   Medication Sig Dispense Refill     ASPIRIN ADULT LOW STRENGTH 81 MG EC tablet TAKE ONE TABLET BY MOUTH ONCE DAILY 90 tablet 1     citalopram (CELEXA) 40 MG tablet TAKE ONE TABLET BY MOUTH ONCE DAILY 30 tablet 8     doxepin (SINEQUAN) 10 MG/ML (HIGH CONC) solution Mouth pain: take 1.5 mL mixed with equal amount tap water. Swish for 60 sec then spit. Take every 4 h as needed. Sleep: take 0.6 mL at night. May repeat this once more overnight. Do not take more than 2 xanax overnight. 118 mL 0     guaiFENesin (MUCINEX) 600 MG 12 hr tablet Take 2 tablets (1,200 mg) by mouth 2 times daily 60 tablet 1     HYDROcodone-acetaminophen (NORCO)  MG per tablet Take 1-2 tablets by mouth every 4 hours as needed for severe pain  (No more than 10 a day. Stop dilaudid.) 150 tablet 0     hydrOXYzine (VISTARIL) 25 MG capsule TAKE 1 CAPSULE (25 MG) BY MOUTH 4 TIMES DAILY AS NEEDED FOR ANXIETY 120 capsule 3     magic mouthwash (ENTER INGREDIENTS IN COMMENTS) suspension Take 10 mLs by mouth every 4 hours as needed (pain) Swish and spit 200 mL 1     methadone (DOLOPHINE) 5 MG tablet Take 1 tablet (5 mg) by mouth 4 times daily 120 tablet 0     metoprolol succinate ER (TOPROL-XL) 50 MG 24 hr tablet Take 1 tablet (50 mg) by mouth daily 90 tablet 3     naloxone (NARCAN) 4 MG/0.1ML nasal spray Spray 1 spray (4 mg) into one nostril alternating nostrils as needed for opioid reversal every 2-3 minutes until assistance arrives 0.2 mL      nitroGLYcerin (NITROSTAT) 0.4 MG sublingual tablet For chest pain place 1 tablet under the tongue every 5 minutes for 3 doses. If symptoms persist 5 minutes after 1st dose call 911. 25 tablet 0     omeprazole (PRILOSEC) 40 MG DR capsule Take 1 capsule (40 mg) by mouth daily 90 capsule 3     ondansetron (ZOFRAN-ODT) 8 MG ODT tab Take 1 tablet (8 mg) by mouth every 8 hours as needed for nausea 30 tablet 11     prochlorperazine (COMPAZINE) 10 MG tablet Take 0.5 tablets (5 mg) by mouth every 6 hours as needed (Nausea/Vomiting) 30 tablet 11     rosuvastatin (CRESTOR) 40 MG tablet Take 1 tablet (40 mg) by mouth daily 90 tablet 1     testosterone (ANDROGEL 1.62 % PUMP) 20.25 MG/ACT gel testosterone 20.25 mg/1.25 gram (1.62 %) transdermal gel pump       zolpidem (AMBIEN) 10 MG tablet TAKE ONE TABLET BY MOUTH EVERY EVENING AS NEEDED FOR SLEEP 30 tablet 5     ALPRAZolam (XANAX) 0.5 MG tablet TAKE 1 TABLET (0.5 MG) BY MOUTH 4 TIMES DAILY AS NEEDED FOR ANXIETY 120 tablet 0     ALLERGIES  Allergies   Allergen Reactions     Animal Dander      Azithromycin Nausea and Vomiting     Dust Mites      Pollen Extract      Smoke.      REVIEW OF SYSTEMS  As above in the HPI, o/w complete 12-point ROS was negative.    PHYSICAL EXAM  /78    "Pulse 90   Temp 98.3  F (36.8  C) (Oral)   Resp 18   Ht 1.74 m (5' 8.5\")   Wt 96.3 kg (212 lb 3.2 oz)   SpO2 97%   BMI 31.80 kg/m    Wt Readings from Last 3 Encounters:   05/26/21 96.3 kg (212 lb 3.2 oz)   05/20/21 100.7 kg (222 lb)   05/19/21 99.9 kg (220 lb 4.8 oz)     GEN: NAD  HEENT: EOMI, no icterus, injection or pallor. Oropharynx shows a clean based ulcer on the hard palate, other expected erythematous changes in the oropharynx from radiation  NECK: Mild radiation dermatitis  LUNGS: clear bilaterally  CV: regular, no murmurs, rubs, or gallops  ABDOMEN: soft, non-tender, non-distended, normal bowel sounds  EXT: warm, no edema  NEURO: alert    LABORATORY AND IMAGING STUDIES  Results for MANUEL MONTOYA (MRN [id number]) as of 5/26/2021 14:34   5/19/2021 07:54   Sodium 135   Potassium 4.0   Chloride 102   Carbon Dioxide 28   Urea Nitrogen 23   Creatinine 1.16   GFR Estimate 64   GFR Estimate If Black 74   Calcium 8.9   Anion Gap 4   Magnesium 1.9   Albumin 3.0 (L)   Protein Total 6.8   Bilirubin Total 0.4   Alkaline Phosphatase 69   ALT 22   AST 17   Glucose 106 (H)   WBC 5.1   Hemoglobin 11.7 (L)   Hematocrit 36.2 (L)   Platelet Count 140 (L)   RBC Count 4.18 (L)   MCV 87   MCH 28.0   MCHC 32.3   RDW 13.9   Diff Method Automated Method   % Neutrophils 72.4   % Lymphocytes 10.6   % Monocytes 14.6   % Eosinophils 1.8   % Basophils 0.4   % Immature Granulocytes 0.2   Nucleated RBCs 0   Absolute Neutrophil 3.7   Absolute Lymphocytes 0.5 (L)   Absolute Monocytes 0.7   Absolute Eosinophils 0.1   Absolute Basophils 0.0   Abs Immature Granulocytes 0.0   Absolute Nucleated RBC 0.0     Labs were independently reviewed and interpreted by me          Again, thank you for allowing me to participate in the care of your patient.        Sincerely,        Shanthi Schrader MD    "

## 2021-05-26 NOTE — PROGRESS NOTES
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 Christensen). Path: SCC, non keratinizing, p16 +gardenia  3/23/21 CT neck. 2.9 x 2.7 x 3.5 cm L tonsil fullness involving soft palate, level 2-3 neck nodes  4/2/21 PET/CT. 2.7 x 2.2 x 2.7 cm L palatine tonsil mass (SUV 24), extension to oral cavity, 3.4 x 2.0 cm L level 4 node invading SCM, 1.8 x 1.8 cm L level 2A/3 node (SUV 7.9) w/ECS, 1.4 x 0.9 cm R level 2A node (SUV 6.7)  4/28~current  Chemoradiation with weekly cisplatin. No HD cisplatin due to CKD.  5/21/21 [facility] ED for fever to 102. No localizing source, cultures negative, not neutropenic    ASSESSMENT AND PLAN  SCC L tonsil, [id number], p16 +gardenia, ECS +gardenia: Currently in week 5 of chemoradiation. Having the expected toxicities. Got quite difficult in the last few days. Continue treatment. No neuropathy. Might be having very slight tinnitus.    Malnutrition, weight loss: Has lost about 15 pounds, with about 10 pounds in the last week alone. He did ok with oral intake yesterday but has essentially eaten nothing today. Seeing we still have 3 weeks of radiation left, I think he needs a Gtube. He was reluctant to get one upfront, and is really disappointed about that now, but discussed the rationale and negative impact of malnutrition. Will start working on getting that scheduled at the  with thoracic surgery    Mucositis: Unlikely, but will check HSV PCR just in case.     Fever, chills: Having intermittent temps to the 99 range mostly a few days after  cisplatin. No other symptoms to suggest infection. HSV swab as above. Blood culture and UA/UC tomorrow.     Secretions: He's having a hard time while getting radiation. Unfortunately, has issues from BPH already and we don't have drugs that do not have a risk of causing worsening urinary retention.      Constipation: Taking senna + colace 2 tabs of both BID and having trouble. Asked him to add miralax, combine the senna + colace into one, and take milk of magnesia prn.    Anticipated acute on chronic opioid use: Working with Dr. Serrano on this; methadone increased this week and will start the 5 mg QID today or tomorrow. Will get EKG tomorrow to look at QTc.    CKD: Creatinine stable    Anxiety, depression: Will ask Dr. Serrano if he can recommend an antidepressant to get us through the acute period.     ASCVD:  See note by Dr. Ch, Cardiology, from 10/20/20. Might need to add metoprolol if his BP is consistently >140/90. Would also decrease lisinopril if creatinine is a problem.    Review of the result(s) of each unique test - CMP, CBC pd    40 minutes spent on the date of the encounter doing chart review, history and exam, documentation and further activities per the note     Shanthi Schrader MD  Associate Professor of Medicine  Hematology, Oncology and Transplantation      SUBJECTIVE  New returns today for follow up of tonsil cancer, just starting week 5 of chemoradiation. Having more trouble eating since last week. Yesterday had half egg, cream of wheat, sausage x 2, 1 malt (1340 calories - made with KEYUR powder, milk, ice protein). Hasn't really had anything today (now mid-afternoon). Have some issues with constipation. Taking senna-S 2 tabs BID. Says he spikes temps a few days after each chemo and has occasional chills.   No hearing loss. Maybe a tiny bit of tinnitus  No other numbness/tingling  For mucositis, he's going to increase methadone to 5 mg q8h - plans to pick it up today. Is very worried about  running out of prn hydrocodone-acetaminophen.  No other new problems.     PAST MEDICAL HISTORY  SCC as above  ASCVD. Angina in 12/2016. [facility] with proximal LAD stenosis, s/p PTCA and stent. Not on metoprolol due to hypotension. Ses Dr. Aramis Ch, Cardiologist.  CKD baseline ~ 1.2-1.3, up to 1.93 on 11/2/20  HTN  Dyslipidemia  H/o prostate ca s/p robotic prostatectomy about 16 years ago followed by Dr. Meir Torres at [facility] in [place]  Nephrolithasis  Anxiety, insomnia. Takes zolpidem and alprazolam nightly   OA  Chronic sinusitus. Takes chronic hydrocodone-acetaminophen for this  Inguinal hernia  GERD, hiatal hernia. Met with Dr. Adams 2/18/20, symptoms not due to hiatal hernia  ORIF R radius 2017  H/o retroperitoneal hematoma 10/2017. Admitted to [facility]. [place] to be related to lithotripsy 10 days prior  Hemorrhoids  PAD s/p R iliac artery stent after injury during [facility]    CURRENT OUTPATIENT MEDICATIONS  Current Outpatient Medications   Medication Sig Dispense Refill     ASPIRIN ADULT LOW STRENGTH 81 MG EC tablet TAKE ONE TABLET BY MOUTH ONCE DAILY 90 tablet 1     citalopram (CELEXA) 40 MG tablet TAKE ONE TABLET BY MOUTH ONCE DAILY 30 tablet 8     doxepin (SINEQUAN) 10 MG/ML (HIGH CONC) solution Mouth pain: take 1.5 mL mixed with equal amount tap water. Swish for 60 sec then spit. Take every 4 h as needed. Sleep: take 0.6 mL at night. May repeat this once more overnight. Do not take more than 2 xanax overnight. 118 mL 0     guaiFENesin (MUCINEX) 600 MG 12 hr tablet Take 2 tablets (1,200 mg) by mouth 2 times daily 60 tablet 1     HYDROcodone-acetaminophen (NORCO)  MG per tablet Take 1-2 tablets by mouth every 4 hours as needed for severe pain (No more than 10 a day. Stop dilaudid.) 150 tablet 0     hydrOXYzine (VISTARIL) 25 MG capsule TAKE 1 CAPSULE (25 MG) BY MOUTH 4 TIMES DAILY AS NEEDED FOR ANXIETY 120 capsule 3     magic mouthwash (ENTER INGREDIENTS IN COMMENTS) suspension Take 10 mLs by  "mouth every 4 hours as needed (pain) Swish and spit 200 mL 1     methadone (DOLOPHINE) 5 MG tablet Take 1 tablet (5 mg) by mouth 4 times daily 120 tablet 0     metoprolol succinate ER (TOPROL-XL) 50 MG 24 hr tablet Take 1 tablet (50 mg) by mouth daily 90 tablet 3     naloxone (NARCAN) 4 MG/0.1ML nasal spray Spray 1 spray (4 mg) into one nostril alternating nostrils as needed for opioid reversal every 2-3 minutes until assistance arrives 0.2 mL      nitroGLYcerin (NITROSTAT) 0.4 MG sublingual tablet For chest pain place 1 tablet under the tongue every 5 minutes for 3 doses. If symptoms persist 5 minutes after 1st dose call 911. 25 tablet 0     omeprazole (PRILOSEC) 40 MG DR capsule Take 1 capsule (40 mg) by mouth daily 90 capsule 3     ondansetron (ZOFRAN-ODT) 8 MG ODT tab Take 1 tablet (8 mg) by mouth every 8 hours as needed for nausea 30 tablet 11     prochlorperazine (COMPAZINE) 10 MG tablet Take 0.5 tablets (5 mg) by mouth every 6 hours as needed (Nausea/Vomiting) 30 tablet 11     rosuvastatin (CRESTOR) 40 MG tablet Take 1 tablet (40 mg) by mouth daily 90 tablet 1     testosterone (ANDROGEL 1.62 % PUMP) 20.25 MG/ACT gel testosterone 20.25 mg/1.25 gram (1.62 %) transdermal gel pump       zolpidem (AMBIEN) 10 MG tablet TAKE ONE TABLET BY MOUTH EVERY EVENING AS NEEDED FOR SLEEP 30 tablet 5     ALPRAZolam (XANAX) 0.5 MG tablet TAKE 1 TABLET (0.5 MG) BY MOUTH 4 TIMES DAILY AS NEEDED FOR ANXIETY 120 tablet 0     ALLERGIES  Allergies   Allergen Reactions     Animal Dander      Azithromycin Nausea and Vomiting     Dust Mites      Pollen Extract      Smoke.      REVIEW OF SYSTEMS  As above in the HPI, o/w complete 12-point ROS was negative.    PHYSICAL EXAM  /78   Pulse 90   Temp 98.3  F (36.8  C) (Oral)   Resp 18   Ht 1.74 m (5' 8.5\")   Wt 96.3 kg (212 lb 3.2 oz)   SpO2 97%   BMI 31.80 kg/m    Wt Readings from Last 3 Encounters:   05/26/21 96.3 kg (212 lb 3.2 oz)   05/20/21 100.7 kg (222 lb)   05/19/21 99.9 " kg (220 lb 4.8 oz)     GEN: NAD  HEENT: EOMI, no icterus, injection or pallor. Oropharynx shows a clean based ulcer on the hard palate, other expected erythematous changes in the oropharynx from radiation  NECK: Mild radiation dermatitis  LUNGS: clear bilaterally  CV: regular, no murmurs, rubs, or gallops  ABDOMEN: soft, non-tender, non-distended, normal bowel sounds  EXT: warm, no edema  NEURO: alert    LABORATORY AND IMAGING STUDIES  Results for MANUEL MONTOYA (MRN [id number]) as of 5/26/2021 14:34   5/19/2021 07:54   Sodium 135   Potassium 4.0   Chloride 102   Carbon Dioxide 28   Urea Nitrogen 23   Creatinine 1.16   GFR Estimate 64   GFR Estimate If Black 74   Calcium 8.9   Anion Gap 4   Magnesium 1.9   Albumin 3.0 (L)   Protein Total 6.8   Bilirubin Total 0.4   Alkaline Phosphatase 69   ALT 22   AST 17   Glucose 106 (H)   WBC 5.1   Hemoglobin 11.7 (L)   Hematocrit 36.2 (L)   Platelet Count 140 (L)   RBC Count 4.18 (L)   MCV 87   MCH 28.0   MCHC 32.3   RDW 13.9   Diff Method Automated Method   % Neutrophils 72.4   % Lymphocytes 10.6   % Monocytes 14.6   % Eosinophils 1.8   % Basophils 0.4   % Immature Granulocytes 0.2   Nucleated RBCs 0   Absolute Neutrophil 3.7   Absolute Lymphocytes 0.5 (L)   Absolute Monocytes 0.7   Absolute Eosinophils 0.1   Absolute Basophils 0.0   Abs Immature Granulocytes 0.0   Absolute Nucleated RBC 0.0     Labs were independently reviewed and interpreted by me

## 2021-05-26 NOTE — TELEPHONE ENCOUNTER
Xanax      Last Written Prescription Date:  4/9/2021  Last Fill Quantity: 120,   # refills: 1  Last Office Visit: 3/4/2021  Future Office visit:    Next 5 appointments (look out 90 days)    Jun 02, 2021  8:15 AM  (Arrive by 8:00 AM)  Return Visit with Leisa Zarate CNP  [facility] [place] ) [address] [place] [id number]  [phone]   Jun 08, 2021  9:00 AM  (Arrive by 8:45 AM)  Return Visit with Shanthi Schrader MD  [facility] ([facility] [place] ) [address] [place] [id number] [phone]           Routing refill request to provider for review/approval because:  Drug not on the [facility], P or [facility] refill protocol or controlled substance

## 2021-05-26 NOTE — NURSING NOTE
Chief Complaint   Patient presents with     Blood Draw     VPT blood draw and vitals      Venipuncture labs drawn from left arm. [facility]IC labs drawn today due to unsigned orders. Provider notified.

## 2021-05-27 ENCOUNTER — INFUSION THERAPY VISIT (OUTPATIENT)
Dept: ONCOLOGY | Facility: CLINIC | Age: 71
End: 2021-05-27
Attending: INTERNAL MEDICINE
Payer: MEDICARE

## 2021-05-27 ENCOUNTER — OFFICE VISIT (OUTPATIENT)
Dept: RADIATION ONCOLOGY | Facility: CLINIC | Age: 71
End: 2021-05-27
Attending: RADIOLOGY
Payer: MEDICARE

## 2021-05-27 VITALS
BODY MASS INDEX: 32.13 KG/M2 | WEIGHT: 214.4 LBS | DIASTOLIC BLOOD PRESSURE: 76 MMHG | HEART RATE: 87 BPM | SYSTOLIC BLOOD PRESSURE: 135 MMHG

## 2021-05-27 VITALS
TEMPERATURE: 98.3 F | RESPIRATION RATE: 16 BRPM | SYSTOLIC BLOOD PRESSURE: 138 MMHG | DIASTOLIC BLOOD PRESSURE: 82 MMHG | OXYGEN SATURATION: 96 % | HEART RATE: 98 BPM

## 2021-05-27 DIAGNOSIS — C09.9 SQUAMOUS CELL CARCINOMA OF LEFT TONSIL (H): Primary | ICD-10-CM

## 2021-05-27 DIAGNOSIS — C09.9 SQUAMOUS CELL CARCINOMA OF LEFT TONSIL (H): ICD-10-CM

## 2021-05-27 DIAGNOSIS — E83.42 HYPOMAGNESEMIA: Primary | ICD-10-CM

## 2021-05-27 DIAGNOSIS — R50.9 FEVER AND CHILLS: ICD-10-CM

## 2021-05-27 LAB
ALBUMIN UR-MCNC: 30 MG/DL
APPEARANCE UR: CLEAR
BILIRUB UR QL STRIP: NEGATIVE
COLOR UR AUTO: YELLOW
GLUCOSE UR STRIP-MCNC: NEGATIVE MG/DL
HGB UR QL STRIP: NEGATIVE
HSV1 DNA SPEC QL NAA+PROBE: NOT DETECTED
HSV2 DNA SPEC QL NAA+PROBE: NOT DETECTED
KETONES UR STRIP-MCNC: 20 MG/DL
LABORATORY COMMENT REPORT: NORMAL
LEUKOCYTE ESTERASE UR QL STRIP: NEGATIVE
MUCOUS THREADS #/AREA URNS LPF: PRESENT /LPF
NITRATE UR QL: NEGATIVE
PH UR STRIP: 5 PH (ref 5–7)
RBC #/AREA URNS AUTO: 1 /HPF (ref 0–2)
SOURCE: ABNORMAL
SP GR UR STRIP: 1.02 (ref 1–1.03)
SPECIMEN SOURCE: NORMAL
SQUAMOUS #/AREA URNS AUTO: <1 /HPF (ref 0–1)
UROBILINOGEN UR STRIP-MCNC: 0 MG/DL (ref 0–2)
WBC #/AREA URNS AUTO: 3 /HPF (ref 0–5)

## 2021-05-27 PROCEDURE — 96367 TX/PROPH/DG ADDL SEQ IV INF: CPT

## 2021-05-27 PROCEDURE — 77386 HC IMRT TREATMENT DELIVERY, COMPLEX: CPT | Performed by: RADIOLOGY

## 2021-05-27 PROCEDURE — 96413 CHEMO IV INFUSION 1 HR: CPT

## 2021-05-27 PROCEDURE — 258N000003 HC RX IP 258 OP 636: Performed by: INTERNAL MEDICINE

## 2021-05-27 PROCEDURE — 87086 URINE CULTURE/COLONY COUNT: CPT | Performed by: INTERNAL MEDICINE

## 2021-05-27 PROCEDURE — 96375 TX/PRO/DX INJ NEW DRUG ADDON: CPT

## 2021-05-27 PROCEDURE — 250N000011 HC RX IP 250 OP 636: Performed by: INTERNAL MEDICINE

## 2021-05-27 PROCEDURE — 81001 URINALYSIS AUTO W/SCOPE: CPT | Performed by: INTERNAL MEDICINE

## 2021-05-27 PROCEDURE — 999N000127 HC STATISTIC PERIPHERAL IV START W US GUIDANCE

## 2021-05-27 PROCEDURE — 87040 BLOOD CULTURE FOR BACTERIA: CPT | Performed by: INTERNAL MEDICINE

## 2021-05-27 PROCEDURE — 93010 ELECTROCARDIOGRAM REPORT: CPT | Performed by: INTERNAL MEDICINE

## 2021-05-27 PROCEDURE — 93005 ELECTROCARDIOGRAM TRACING: CPT

## 2021-05-27 RX ORDER — PALONOSETRON 0.05 MG/ML
0.25 INJECTION, SOLUTION INTRAVENOUS ONCE
Status: COMPLETED | OUTPATIENT
Start: 2021-05-27 | End: 2021-05-27

## 2021-05-27 RX ADMIN — CISPLATIN 90 MG: 1 INJECTION, SOLUTION INTRAVENOUS at 11:19

## 2021-05-27 RX ADMIN — MAGNESIUM SULFATE HEPTAHYDRATE: 500 INJECTION, SOLUTION INTRAMUSCULAR; INTRAVENOUS at 10:10

## 2021-05-27 RX ADMIN — PALONOSETRON HYDROCHLORIDE 0.25 MG: 0.25 INJECTION, SOLUTION INTRAVENOUS at 10:42

## 2021-05-27 RX ADMIN — DEXAMETHASONE SODIUM PHOSPHATE: 10 INJECTION, SOLUTION INTRAMUSCULAR; INTRAVENOUS at 10:42

## 2021-05-27 ASSESSMENT — PAIN SCALES - GENERAL: PAINLEVEL: MODERATE PAIN (4)

## 2021-05-27 NOTE — LETTER
2021         RE: Quan Murphy   [address] [facility] [address]        Dear Colleague,    Thank you for referring your patient, Quan Murphy, to the [facility] RADIATION ONCOLOGY. Please see a copy of my visit note below.    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 Cycle 1, day 1 of weekly cisplatin.    2021: Weekly RT visit. Current dose of 424 cGy. Tolerating treatment well. No issues.    2021: Cycle 1, day 8 of weekly cisplatin.    2021: Weekly RT visit. Current dose of 1484 cGy. Tolerating treatment well.    2021: Cycle 1, day 15 of weekly cisplatin    2021: Weekly RT visit. Current dose of 2544 cGy. Moderate oropharyngeal pain.    2021: Cycle one, day 22 of weekly cisplatin    2021: Weekly RT visit. Current dose of 3604 cGy. Moderate to severe odynophagia. 2 kg weight loss over the past week.    2021: Cycle 1, day 29 of weekly cisplatin. Weekly RT visit. Current dose of 4664 cGy. Severe oral cavity and oropharyngeal pain. 3 kg weight loss over the past week.    ED visits/Hospitalizations:  1. 2021: Presentation to ED with low-grade fevers (99.6-100.1  F) and tachycardia to the low 100-120s. Work-up negative. Symptoms improved with IV fluid support and pain medication.    Missed Treatments:  None    Subjective: Mr. Murphy presents to clinic today for his weekly on-treatment visit.  His biggest complaint today is related to moderate to severe oral cavity and oropharyngeal  pain which he rates as an 8-9/10 in severity along with thickened oral cavity secretions. He is currently using methadone 5 mg every 6 hours with hydrocodone 2 tabs every 5-6 h for control of his symptoms. His p.o. intake has been minimal over the past week with small amounts of liquids and puréed foods by mouth. His weight is down approximately 3 kg over the past week as well. Medical oncology has scheduled him for PEG placement given his weight loss over the past 2 weeks.    ROS:   Constitutional  Pain (0-10): 8 (mouth), 9 (throat), 8 (skin)  Fatigue: Moderate to severe    CNS  Headaches: None    ENT  Mucositis: Severe    Cardiopulmonary  Dyspnea: None    GI  Nausea/vomiting: Mild nausea without vomiting    Nutrition  PEG: No  Diet: Puréed/liquid    Integumentary  Dermatitis: Moderate    Objective:   Current weight: 97.3 kg  Last week's weight: 100.7 kg  Starting weight: 105.7 kg    BP: 135/76 (sitting), 148/76 (standing)  Pulse: 87 (sitting), 91 (standing)    General: Moderately fatigued-appearing 70-year-old gentleman seated in an examination chair in mild discomfort secondary to oral cavity/oropharyngeal pain.  HEENT: NC/AT.  EOMI.  No rhinorrhea or epistaxis.  Moderately thickened oral cavity secretions which pooled within the anterior floor of mouth.  Confluent mucositis predominantly involving the left lateral oral tongue, left RMT, left soft palate and posterior oropharyngeal wall.  No evidence of oral thrush.  Pulmonary: No wheezing, stridor or respiratory distress  Skin: Moderate erythema of the bilateral neck without desquamation.      Treatment-related toxicities (CTCAE v5.0):    Mucositis: Grade 2    Dermatitis: Grade 2    Assessment:    Mr. 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 He is receiving curative-intent concurrent chemoradiation with weekly cisplatin. He has developed worsening mucositis and associated decreased p.o. intake over  the past week.    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:    Continue chemoradiotherapy    Pain management:    Continue pain control regimen as delineated by the palliative care service with methadone for long-acting pain control and as needed Norco for moderate to severe breakthrough symptoms    Fluids/Electrolytes/Nutrition:    Agree with PEG placement    Recommended taking small but frequent quantities of high caloric foods to minimize weight loss in the interim    Dermatitis:    Continue twice daily Aquaphor use to the lower face bilateral neck    Mucositis:    Pain control as described above    Continue frequent salt/soda rinses    Anxiety:    Continue Celexa    Mosaiq chart and setup information reviewed  IGRT images reviewed    Medication list reviewed    Ahmet Robbins MD/PhD    Dept of Radiation Oncology  [facility]

## 2021-05-27 NOTE — PROGRESS NOTES
Infusion Nursing Note:  Quan GANT Somsalvatore presents today for cycle 1 day 29 Cisplatin.    Patient seen by provider today: No   present during visit today: Not Applicable.    Note: cycle 1 day 29 cisplatin  EKG competed QTc 430. Blood cultures drawn and urine collected per treatment plan orders today.    Pt void prior, during, and after cisplatin    Intravenous Access:  PIV     Treatment Conditions:  Lab Results   Component Value Date    HGB 10.7 05/26/2021     Lab Results   Component Value Date    WBC 2.5 05/26/2021      Lab Results   Component Value Date    ANEU 1.8 05/26/2021     Lab Results   Component Value Date     05/26/2021      Lab Results   Component Value Date     05/26/2021                   Lab Results   Component Value Date    POTASSIUM 4.4 05/26/2021           Lab Results   Component Value Date    MAG 1.8 05/26/2021            Lab Results   Component Value Date    CR 1.33 05/26/2021                   Lab Results   Component Value Date    LATRELL 8.6 05/26/2021                Lab Results   Component Value Date    BILITOTAL 0.5 05/26/2021           Lab Results   Component Value Date    ALBUMIN 2.8 05/26/2021                    Lab Results   Component Value Date    ALT 25 05/26/2021           Lab Results   Component Value Date    AST 36 05/26/2021       Results reviewed, labs MET treatment parameters, ok to proceed with treatment.    Post Infusion Assessment:  Patient tolerated infusion without incident.  Blood return noted pre and post infusion.  Site patent and intact, free from redness, edema or discomfort.  No evidence of extravasations.  Access discontinued per protocol.       Discharge Plan:   Patient declined prescription refills.  Discharge instructions reviewed with: Patient.  Patient and/or family verbalized understanding of discharge instructions and all questions answered.  AVS to patient via [employer].  Patient will return 6/2/21 for next appointment.   Patient discharged in  stable condition accompanied by: self.  Departure Mode: Ambulatory.      Sushma Vasquez RN

## 2021-05-27 NOTE — PROGRESS NOTES
EKG ordered by Dr. Schrader for prolong QTc syndrome follow up. EKG performed, given to RN for evaluation and transmitted to chart.    -Kristal GANT CMA

## 2021-05-27 NOTE — PATIENT INSTRUCTIONS
Contact Numbers:   [facility] Main Line: [phone]    Call triage to speak with triage if you are experiencing chills and/or temperature greater than or equal to 100.5, uncontrolled nausea/vomiting, diarrhea, constipation, dizziness, shortness of breath, chest pain, bleeding, unexplained bruising, or any new/concerning symptoms, questions/concerns.     If you are having any concerning symptoms or wish to speak to a provider before your next infusion visit, please call your care coordinator or triage to notify them so we can adequately serve you.     If you need a refill on a medication or narcotic prescription, please call triage or your care coordinator before your infusion appointment.                 May 2021      [place] Monday Tuesday Wednesday Thursday Friday Saturday                                 1       2     3    TREATMENT  12:45 PM   (30 min.)   [facility] RAD ONC VARIAN   [facility] Radiation Oncology 4    TREATMENT  12:45 PM   (30 min.)   [facility] RAD ONC [facility] Radiation Oncology 5    LAB PERIPHERAL   7:30 AM   (15 min.)   [facility] LAB DRAW   [facility]    RETURN   8:00 AM   (45 min.)   Leisa Zarate, CNP   [facility]    ONC INFUSION 6 HR (360 MIN)   9:00 AM   (360 min.)   [facility] ONC INFUSION NURSE   [facility]    SWALLOW TREATMENT  10:00 AM   (30 min.)   Judy Bunhc, SLP   [facility]    TREATMENT  12:45 PM   (30 min.)   [facility] RAD ONC VARIAN   [facility] Radiation Oncology 6    TREATMENT  12:45 PM   (30 min.)   [facility] RAD ONC [facility] Radiation Oncology    OTV   1:15 PM   (15 min.)   Ahmet Robbins MD   [facility] Radiation Oncology 7    TREATMENT  12:45 PM   (30 min.)   [facility] RAD ONC [facility] Radiation Oncology 8       9     10    TREATMENT  12:45 PM   (30 min.)   UMP RAD ONC  VARIAN   [facility] Radiation Oncology 11    TREATMENT  12:45 PM   (30 min.)   UMP RAD ONC VARIAN   [facility] Radiation Oncology 12    LAB PERIPHERAL   7:30 AM   (15 min.)   [facility] LAB DRAW   [facility]    RETURN   8:00 AM   (45 min.)   Leisa Zarate CNP   [facility]    ONC INFUSION 6 HR (360 MIN)   9:00 AM   (360 min.)   [facility] ONC INFUSION NURSE   [facility]    TREATMENT  12:45 PM   (30 min.)   [facility] RAD ONC [facility] Radiation Oncology    Admission   9:34 PM   Gemini Pandya APRN CNP   [facility]   (Discharge: 5/13/2021)    XR CHEST PORT 1 VIEW  10:15 PM   (20 min.)   PHXP1   [facility] 13    TREATMENT  12:45 PM   (30 min.)   [facility] RAD ONC [facility] Radiation Oncology    OTV   1:15 PM   (15 min.)   Ahmet Robbins MD   [facility] Radiation Oncology 14    NEW   9:05 AM   (80 min.)   Elier Serrano MD   [facility]    TREATMENT  12:45 PM   (30 min.)   [facility] RAD ONC [facility] Radiation Oncology 15       16     17    TELEPHONE VISIT RETURN   9:00 AM   (30 min.)   Crystal Marino RD   [facility]    TREATMENT  12:45 PM   (30 min.)   [facility] RAD ONC [facility] Radiation Oncology 18    TREATMENT  12:45 PM   (30 min.)   [facility] RAD ONC [facility] Radiation Oncology 19    LAB PERIPHERAL   7:30 AM   (15 min.)   [facility] DRAW   [facility]    RETURN   8:00 AM   (45 min.)   Leisa Zarate CNP   [facility]    ONC INFUSION 6 HR (360 MIN)   9:00 AM   (360 min.)   [facility] ONC INFUSION NURSE   [facility]    SWALLOW TREATMENT   9:30 AM   (30 min.)   Reba Oshea SLP   M  [facility]    TREATMENT  12:45 PM   (30 min.)   [facility] RAD ONC VARIAN   [facility] Radiation Oncology 20    TREATMENT  12:45 PM   (30 min.)   UMP RAD ONC [facility] Radiation Oncology    OTV   1:15 PM   (15 min.)   Ahmet Robbins MD   [facility] Radiation Oncology 21    TREATMENT  12:45 PM   (30 min.)   [facility] RAD ONC [facility] Radiation Oncology 22       23     24    TELEPHONE VISIT RETURN   9:30 AM   (30 min.)   Crystal Marino RD   [facility]    TREATMENT  12:45 PM   (30 min.)   [facility] RAD ONC [facility] Radiation Oncology 25    TREATMENT  12:45 PM   (30 min.)   [facility] RAD ONC [facility] Radiation Oncology 26    TREATMENT  12:45 PM   (30 min.)   [facility] RAD ONC [facility] Radiation Oncology    LAB PERIPHERAL   1:30 PM   (15 min.)   [facility] DRAW   [facility]    RETURN   1:45 PM   (30 min.)   Shanthi Schrader MD   [facility] 27    ONC INFUSION 6 HR (360 MIN)   9:00 AM   (360 min.)   [facility] ONC INFUSION NURSE   [facility]    TREATMENT  12:45 PM   (30 min.)   [facility] RAD ONC [facility] Radiation Oncology    OTV   1:15 PM   (15 min.)   Ahmet Robbins MD   [facility] Radiation Oncology 28    TELEPHONE VISIT RETURN   9:20 AM   (40 min.)   Elier Serrano MD   [facility]    TREATMENT  12:45 PM   (30 min.)   [facility] RAD ONC VARIAN   [facility] Radiation Oncology 29       30     31 June 2021 Sunday Monday Tuesday Wednesday Thursday Friday Saturday 1    TELEPHONE VISIT RETURN   9:30 AM   (30 min.)   Crystal Marino RD   [facility]    TREATMENT  12:45 PM    (30 min.)   [facility] RAD ONC [facility] Radiation Oncology 2    LAB PERIPHERAL   7:30 AM   (15 min.)   [facility] DRAW   [facility]    RETURN   8:00 AM   (45 min.)   Leisa Zarate CNP   [facility]    ONC INFUSION 6 HR (360 MIN)   9:00 AM   (360 min.)   [facility] ONC INFUSION NURSE   [facility]    SWALLOW TREATMENT   9:30 AM   (30 min.)   Reba Oshea SLP   [facility]    TREATMENT  12:45 PM   (30 min.)   [facility] RAD ONC [facility] Radiation Oncology 3    TREATMENT  12:45 PM   (30 min.)   [facility] RAD ONC [facility] Radiation Oncology    OTV   1:15 PM   (15 min.)   Ahmet Robbins MD   [facility] Radiation Oncology 4    TREATMENT  12:45 PM   (30 min.)   [facility] RAD ONC [facility] Radiation Oncology 5       6     7    TREATMENT  12:45 PM   (30 min.)   [facility] RAD ONC [facility] Radiation Oncology 8    LAB PERIPHERAL   8:30 AM   (15 min.)   [facility] DRAW   [facility]    RETURN   8:45 AM   (30 min.)   Shanthi Schrader MD   [facility]    ONC INFUSION 2 HR (120 MIN)  10:00 AM   (120 min.)   [facility] ONC INFUSION NURSE   [facility]    TREATMENT  12:45 PM   (30 min.)   [facility] RAD ONC [facility] Radiation Oncology 9    TREATMENT  12:45 PM   (30 min.)   [facility] RAD ONC [facility] Radiation Oncology 10    TREATMENT  12:45 PM   (30 min.)   [facility] RAD ONC [facility] Radiation Oncology    OTV   1:15 PM   (15 min.)   Ahmet Robbins MD   [facility] Radiation Oncology 11    TREATMENT  12:45 PM   (30 min.)   [facility] RAD ONC [facility] Radiation Oncology 12       13     14    TREATMENT  12:45 PM   (30 min.)   [facility] RAD ONC [facility]  [facility] Radiation Oncology 15     16    SWALLOW TREATMENT   9:30 AM   (30 min.)   Reba Oshea SLP M [facility] 17     18     19       20     21     22     23     24     25     26       27     28     29     30    SWALLOW TREATMENT   9:30 AM   (30 min.)   Reba Oshea SLP M [facility]                                Lab Results:  Recent Results (from the past 12 hour(s))   UA reflex to Microscopic    Collection Time: 05/27/21 10:15 AM   Result Value Ref Range    Color Urine Yellow     Appearance Urine Clear     Glucose Urine Negative NEG^Negative mg/dL    Bilirubin Urine Negative NEG^Negative    Ketones Urine 20 (A) NEG^Negative mg/dL    Specific Gravity Urine 1.024 1.003 - 1.035    Blood Urine Negative NEG^Negative    pH Urine 5.0 5.0 - 7.0 pH    Protein Albumin Urine 30 (A) NEG^Negative mg/dL    Urobilinogen mg/dL 0.0 0.0 - 2.0 mg/dL    Nitrite Urine Negative NEG^Negative    Leukocyte Esterase Urine Negative NEG^Negative    Source Unspecified Urine     RBC Urine 1 0 - 2 /HPF    WBC Urine 3 0 - 5 /HPF    Squamous Epithelial /HPF Urine <1 0 - 1 /HPF    Mucous Urine Present (A) NEG^Negative /LPF

## 2021-05-27 NOTE — PROGRESS NOTES
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 Cycle 1, day 1 of weekly cisplatin.    2021: Weekly RT visit. Current dose of 424 cGy. Tolerating treatment well. No issues.    2021: Cycle 1, day 8 of weekly cisplatin.    2021: Weekly RT visit. Current dose of 1484 cGy. Tolerating treatment well.    2021: Cycle 1, day 15 of weekly cisplatin    2021: Weekly RT visit. Current dose of 2544 cGy. Moderate oropharyngeal pain.    2021: Cycle one, day 22 of weekly cisplatin    2021: Weekly RT visit. Current dose of 3604 cGy. Moderate to severe odynophagia. 2 kg weight loss over the past week.    2021: Cycle 1, day 29 of weekly cisplatin. Weekly RT visit. Current dose of 4664 cGy. Severe oral cavity and oropharyngeal pain. 3 kg weight loss over the past week.    ED visits/Hospitalizations:  1. 2021: Presentation to ED with low-grade fevers (99.6-100.1  F) and tachycardia to the low 100-120s. Work-up negative. Symptoms improved with IV fluid support and pain medication.    Missed Treatments:  None    Subjective: Mr. Murphy presents to clinic today for his weekly on-treatment visit.  His biggest complaint today is related to moderate to severe oral cavity and oropharyngeal pain which he rates as an 8-9/10 in severity along with thickened oral cavity secretions. He is currently using methadone 5 mg every 6 hours with hydrocodone 2 tabs every 5-6 h for control of his symptoms. His p.o. intake has been minimal over the past  week with small amounts of liquids and puréed foods by mouth. His weight is down approximately 3 kg over the past week as well. Medical oncology has scheduled him for PEG placement given his weight loss over the past 2 weeks.    ROS:   Constitutional  Pain (0-10): 8 (mouth), 9 (throat), 8 (skin)  Fatigue: Moderate to severe    CNS  Headaches: None    ENT  Mucositis: Severe    Cardiopulmonary  Dyspnea: None    GI  Nausea/vomiting: Mild nausea without vomiting    Nutrition  PEG: No  Diet: Puréed/liquid    Integumentary  Dermatitis: Moderate    Objective:   Current weight: 97.3 kg  Last week's weight: 100.7 kg  Starting weight: 105.7 kg    BP: 135/76 (sitting), 148/76 (standing)  Pulse: 87 (sitting), 91 (standing)    General: Moderately fatigued-appearing 70-year-old gentleman seated in an examination chair in mild discomfort secondary to oral cavity/oropharyngeal pain.  HEENT: NC/AT.  EOMI.  No rhinorrhea or epistaxis.  Moderately thickened oral cavity secretions which pooled within the anterior floor of mouth.  Confluent mucositis predominantly involving the left lateral oral tongue, left RMT, left soft palate and posterior oropharyngeal wall.  No evidence of oral thrush.  Pulmonary: No wheezing, stridor or respiratory distress  Skin: Moderate erythema of the bilateral neck without desquamation.      Treatment-related toxicities (CTCAE v5.0):    Mucositis: Grade 2    Dermatitis: Grade 2    Assessment:    Mr. 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 He is receiving curative-intent concurrent chemoradiation with weekly cisplatin. He has developed worsening mucositis and associated decreased p.o. intake over the past week.    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:    Continue chemoradiotherapy    Pain management:    Continue pain control regimen as delineated by the palliative care service with methadone for long-acting  pain control and as needed Norco for moderate to severe breakthrough symptoms    Fluids/Electrolytes/Nutrition:    Agree with PEG placement    Recommended taking small but frequent quantities of high caloric foods to minimize weight loss in the interim    Dermatitis:    Continue twice daily Aquaphor use to the lower face bilateral neck    Mucositis:    Pain control as described above    Continue frequent salt/soda rinses    Anxiety:    Continue Celexa    Mosaiq chart and setup information reviewed  IGRT images reviewed    Medication list reviewed    Ahmet Robbins MD/PhD    Dept of Radiation Oncology  [facility]

## 2021-05-28 ENCOUNTER — VIRTUAL VISIT (OUTPATIENT)
Dept: PALLIATIVE CARE | Facility: CLINIC | Age: 71
End: 2021-05-28
Attending: INTERNAL MEDICINE
Payer: MEDICARE

## 2021-05-28 ENCOUNTER — PREP FOR PROCEDURE (OUTPATIENT)
Dept: SURGERY | Facility: CLINIC | Age: 71
End: 2021-05-28

## 2021-05-28 ENCOUNTER — APPOINTMENT (OUTPATIENT)
Dept: RADIATION ONCOLOGY | Facility: CLINIC | Age: 71
End: 2021-05-28
Attending: RADIOLOGY
Payer: MEDICARE

## 2021-05-28 VITALS
DIASTOLIC BLOOD PRESSURE: 75 MMHG | HEART RATE: 75 BPM | SYSTOLIC BLOOD PRESSURE: 117 MMHG | BODY MASS INDEX: 32.23 KG/M2 | WEIGHT: 215.1 LBS

## 2021-05-28 DIAGNOSIS — R62.51 FAILURE TO THRIVE (0-17): ICD-10-CM

## 2021-05-28 DIAGNOSIS — C76.0 HEAD AND NECK CANCER (H): Primary | ICD-10-CM

## 2021-05-28 DIAGNOSIS — G89.3 NEOPLASM RELATED PAIN: ICD-10-CM

## 2021-05-28 DIAGNOSIS — C09.9 SQUAMOUS CELL CARCINOMA OF LEFT TONSIL (H): Primary | ICD-10-CM

## 2021-05-28 LAB
BACTERIA SPEC CULT: NO GROWTH
Lab: NORMAL
SPECIMEN SOURCE: NORMAL

## 2021-05-28 PROCEDURE — 999N000109 HC STATISTIC OP CR VISIT

## 2021-05-28 PROCEDURE — 77386 HC IMRT TREATMENT DELIVERY, COMPLEX: CPT | Performed by: RADIOLOGY

## 2021-05-28 PROCEDURE — 99443 PR PHYSICIAN TELEPHONE EVALUATION 21-30 MIN: CPT | Mod: 95 | Performed by: INTERNAL MEDICINE

## 2021-05-28 RX ORDER — SILVER SULFADIAZINE 10 MG/G
CREAM TOPICAL DAILY
Qty: 400 G | Refills: 0 | Status: SHIPPED | OUTPATIENT
Start: 2021-05-28 | End: 2021-06-28

## 2021-05-28 NOTE — PROGRESS NOTES
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 Cycle 1, day 1 of weekly cisplatin.    2021: Weekly RT visit. Current dose of 424 cGy. Tolerating treatment well. No issues.    2021: Cycle 1, day 8 of weekly cisplatin.    2021: Weekly RT visit. Current dose of 1484 cGy. Tolerating treatment well.    2021: Cycle 1, day 15 of weekly cisplatin    2021: Weekly RT visit. Current dose of 2544 cGy. Moderate oropharyngeal pain.    2021: Cycle one, day 22 of weekly cisplatin    2021: Weekly RT visit. Current dose of 3604 cGy. Moderate to severe odynophagia. 2 kg weight loss over the past week.    2021: Cycle 1, day 29 of weekly cisplatin. Weekly RT visit. Current dose of 4664 cGy. Severe oral cavity and oropharyngeal pain. 3 kg weight loss over the past week.    ED visits/Hospitalizations:  1. 2021: Presentation to ED with low-grade fevers (99.6-100.1  F) and tachycardia to the low 100-120s. Work-up negative. Symptoms improved with IV fluid support and pain medication.    Missed Treatments:  None    Subjective: Mr. Murphy presents to clinic today for his weekly on-treatment visit.  His biggest complaint today is related to moderate to severe oral cavity and oropharyngeal pain which he rates as an 8-9/10 in severity along with thickened oral cavity secretions. He is currently using methadone 5 mg every 6 hours with hydrocodone 2 tabs every 5-6 h for control of his symptoms. His p.o. intake has been minimal over the past week with  small amounts of liquids and puréed foods by mouth. His weight is down approximately 3 kg over the past week as well. Medical oncology has scheduled him for PEG placement given his weight loss over the past 2 weeks.    ROS:   Constitutional  Pain (0-10): 8 (mouth), 9 (throat), 8 (skin)  Fatigue: Moderate to severe    CNS  Headaches: None    ENT  Mucositis: Severe    Cardiopulmonary  Dyspnea: None    GI  Nausea/vomiting: Mild nausea without vomiting    Nutrition  PEG: No  Diet: Puréed/liquid    Integumentary  Dermatitis: Moderate    Objective:   Wt Readings from Last 4 Encounters:   05/27/21 97.3 kg (214 lb 6.4 oz)   05/26/21 96.3 kg (212 lb 3.2 oz)   05/20/21 100.7 kg (222 lb)   05/19/21 99.9 kg (220 lb 4.8 oz)       Last week's weight: 100.7 kg  Starting weight: 105.7 kg    BP sitting 95/68 repeated sitting and was 117/75 p-78  Standing 124/78 repeat 116/74 p-80    General: Moderately fatigued-appearing 70-year-old gentleman seated in an examination chair in mild discomfort secondary to oral cavity/oropharyngeal pain.  HEENT: NC/AT.  EOMI.  No rhinorrhea or epistaxis.  Moderately thickened oral cavity secretions which pooled within the anterior floor of mouth.  Confluent mucositis predominantly involving the left lateral oral tongue, left RMT, left soft palate and posterior oropharyngeal wall.  No evidence of oral thrush.  Pulmonary: No wheezing, stridor or respiratory distress  Skin: Moderate erythema of the bilateral neck without desquamation.  There is yellow crusting scattered where blistered skin opened.      Treatment-related toxicities (CTCAE v5.0):    Mucositis: Grade 2    Dermatitis: Grade 2    Assessment:    Mr. 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 He is receiving curative-intent concurrent chemoradiation with weekly cisplatin. He has developed worsening mucositis and associated decreased p.o. intake over the past week.    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  He will try hard at this.    IV fluids not available over the holiday weekend in [place].  If he is not able to take in adequate fluids, then he should go to ER in [place].    Dermatitis:    Continue twice daily Aquaphor use to the lower face bilateral neck    He does have blisters that opened and have a yellow crust to them now.  Silvadene given to use BID with aquaphor over where the blisters opened.    Mucositis:    Pain control as described above    Continue frequent salt/soda rinses    Anxiety:    Continue Celexa    Mosaiq chart and setup information reviewed  IGRT images reviewed    Medication list reviewed    Maribell Martines NP  Dept of Radiation Oncology  [facility]

## 2021-05-28 NOTE — PROGRESS NOTES
"New is a 70 year old who is being evaluated via a billable telephone visit.      What phone number would you like to be contacted at? [phone]  How would you like to obtain your AVS? Kash  Vitals - Patient Reported  Weight (Patient Reported): 97.1 kg (214 lb)  Height (Patient Reported): 174 cm (5' 8.5\")  BMI (Based on Pt Reported Ht/Wt): 32.06  Pain Score: No Pain (0)  Phone call duration:  minutes    Lauren Jensen MA    P[facility]    (This note was transcribed using voice recognition software. While I review and edit the transcription, I may miss errors, and the software sometimes does unexpected capitalizations and formatting that I miss. Please let me know of any serious mistranscriptions and I will addend this note.)    Patient ID:  Medical - He has SCC L tonsil dx 3/2021 [id number] p16+  Definitive chemoradiotherapy 4-5/2021     He has CKD and chronic pain (headaches/sinus pain) on long-term hydrocodone therapy.  On ambien and alprazolam for insomnia/anxiety     Social - Lives with wife. 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      Care Planning -      Opioid Safety -   +naloxone  See 5/14/2021 opioid risk/safety discussion; I consider him higher risk due to long history of higher risk polysubstance use (opioids, benzos, alcohol), although without a clear BRENNAN history, self-titration of meds, anxiety. Expectations for opioid tapering after cancer treatment discussed.     History:  History gathered today from: patient, family/loved ones, medical chart    \"Pain control is working good\" but also feels mucositis is worsening as his treatment continues. He's about 3/4 done with RT.  Methadone increase to 5mg qid from tid 'has been a life saver.' Continues on #10 hydrocodone a day.  No sedation, but his wife notes he is not cognitively 100% on the meds.  She does all the driving.    Bowels--2 senna bid but still constipated. " Dr Schrader put him on Senna-S yesterday he notes.     Handling fluids orally well he reports. Getting a feeding tube per Dr Schrader.    Not drinking any alcohol.       PE: There were no vitals taken for this visit.   Wt Readings from Last 3 Encounters:   05/27/21 97.3 kg (214 lb 6.4 oz)   05/26/21 96.3 kg (212 lb 3.2 oz)   05/20/21 100.7 kg (222 lb)     On the phone, patient sounds alert and in NAD.  Speaking full sentences, no cough, no noisy resps. Voice hoarse, wet.  Affect full; pleasant; interactive; clear sounding sensorium; fluent speech; memory/thought processes/fund of knowledge all normal.         Data reviewed:  I reviewed recent labs and imaging, my comments:  Cr 1.3  LfTs nl     database reviewed: y      Impression & Recommendations:    69 yo with H&N cancer on definitive chemoradiotherapy causing severe pain    We'll touch base Tuesday to see how he's doing; probably will increase to 30mg a day next week but too early to do that today (METHADONE)    OIC--reminded them Dr Schrader recommended PEG too, and instructed them to use it daily; he can mix it in his gatorade    Commiserated with him about the FT and let him know a lot of people can't get through this without a FT and he should just proceed. Reminded him that he's not 'dieting' and losing weight (which would mean he's mostly losing fat), this type of weight loss he's having he is losing muscle+fat together and that's not good for his health      30 minutes spent on the date of the encounter doing chart review, history and exam, patient education & counseling, documentation and other activities as noted above.  Over 21 min on the phone    Thank you for involving us in the patient's care.   Eiler Serrano MD / Palliative Medicine / Text me via [facility].

## 2021-05-28 NOTE — LETTER
"5/28/2021       RE: Quan Murphy  [address]  [facility] [address]     Dear Colleague,    [facility] you [facility] your [facility] Murphy[facility]. Please see a copy of my visit note below.    New is a 70 year old who is being evaluated via a billable telephone visit.      What phone number would you like to be contacted at? [phone]  How would you like to obtain your AVS? MyChart  Vitals - Patient Reported  Weight (Patient Reported): 97.1 kg (214 lb)  Height (Patient Reported): 174 cm (5' 8.5\")  BMI (Based on Pt Reported Ht/Wt): 32.06  Pain Score: No Pain (0)  Phone call duration:  minutes    Lauren Jensen MA    Palliative Care Outpatient Clinic    (This note was transcribed using voice recognition software. While I review and edit the transcription, I may miss errors, and the software sometimes does unexpected capitalizations and formatting that I miss. Please let me know of any serious mistranscriptions and I will addend this note.)    Patient ID:  Medical - He has SCC L tonsil dx 3/2021 [id number] p16+  Definitive chemoradiotherapy 4-5/2021     He has CKD and chronic pain (headaches/sinus pain) on long-term hydrocodone therapy.  On ambien and alprazolam for insomnia/anxiety     Social - Lives with wife. 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      Care Planning -      Opioid Safety -   +naloxone  See 5/14/2021 opioid risk/safety discussion; I consider him higher risk due to long history of higher risk polysubstance use (opioids, benzos, alcohol), although without a clear BRENNAN history, self-titration of meds, anxiety. Expectations for opioid tapering after cancer treatment discussed.     History:  History gathered today from: patient, family/loved ones, medical chart    \"Pain control is working good\" but also feels mucositis is worsening " as his treatment continues. He's about 3/4 done with RT.  Methadone increase to 5mg qid from tid 'has been a life saver.' Continues on #10 hydrocodone a day.  No sedation, but his wife notes he is not cognitively 100% on the meds.  She does all the driving.    Bowels--2 senna bid but still constipated. Dr Schrader put him on Senna-S yesterday he notes.     Handling fluids orally well he reports. Getting a feeding tube per Dr Schrader.    Not drinking any alcohol.       PE: There were no vitals taken for this visit.   Wt Readings from Last 3 Encounters:   05/27/21 97.3 kg (214 lb 6.4 oz)   05/26/21 96.3 kg (212 lb 3.2 oz)   05/20/21 100.7 kg (222 lb)     On the phone, patient sounds alert and in NAD.  Speaking full sentences, no cough, no noisy resps. Voice hoarse, wet.  Affect full; pleasant; interactive; clear sounding sensorium; fluent speech; memory/thought processes/fund of knowledge all normal.         Data reviewed:  I reviewed recent labs and imaging, my comments:  Cr 1.3  LfTs nl     database reviewed: y      Impression & Recommendations:    69 yo with H&N cancer on definitive chemoradiotherapy causing severe pain    We'll touch base Tuesday to see how he's doing; probably will increase to 30mg a day next week but too early to do that today (METHADONE)    OIC--reminded them Dr Schrader recommended PEG too, and instructed them to use it daily; he can mix it in his gatorade    Commiserated with him about the FT and let him know a lot of people can't get through this without a FT and he should just proceed. Reminded him that he's not 'dieting' and losing weight (which would mean he's mostly losing fat), this type of weight loss he's having he is losing muscle+fat together and that's not good for his health      30 minutes spent on the date of the encounter doing chart review, history and exam, patient education & counseling, documentation and other activities as noted above.  Over 21 min on the phone    Thank  you for involving us in the patient's care.   Elier Serrano MD / Palliative Medicine / Text me via [facility].

## 2021-05-28 NOTE — PROGRESS NOTES
[facility] CANCER CLINIC    RETURN PATIENT VISIT NOTE    PATIENT NAME: Quan Murphy MRN # [id number]  DATE OF VISIT: Jun 2, 2021 YOB: 1950    CANCER TYPE: SCC L tonsil, p16 +gardenia  STAGE: cT2N2 (II)  ECOG PS: 1    Cancer Staging  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)  Staging form: Pharynx - Oropharynx, AJCC 8th Edition  - Clinical stage from 4/13/2021: Stage II (cT2, cN2, cM0) - Signed by Shanthi Schrader MD on 4/13/2021      SUMMARY  3/22/21 L tonsil bx in clinic (Dr. Christensen). Path: SCC, non keratinizing, p16 +gardenia  3/23/21 CT neck. 2.9 x 2.7 x 3.5 cm L tonsil fullness involving soft palate, level 2-3 neck nodes  4/2/21 PET/CT. 2.7 x 2.2 x 2.7 cm L palatine tonsil mass (SUV 24), extension to oral cavity, 3.4 x 2.0 cm L level 4 node invading SCM, 1.8 x 1.8 cm L level 2A/3 node (SUV 7.9) w/ECS, 1.4 x 0.9 cm R level 2A node (SUV 6.7)    Concurrent chemoradiation with weekly cisplatin began 4/28/21    SUBJECTIVE  New is doing poorly today and feels defeated as he feels he is trying hard to follow recommendations. He is having pain and thick secretions that are limiting his PO intake. The secretions make him feel sick during radiation treatment. He is drinking water and drinks 1/4 of a 1300 calorie shake each day. No other new sx. Not sure when PEG is scheduled for.     10 point review of systems otherwise negative    CURRENT OUTPATIENT MEDICATIONS  Current Outpatient Medications   Medication Sig Dispense Refill     ALPRAZolam (XANAX) 0.5 MG tablet TAKE 1 TABLET (0.5 MG) BY MOUTH 4 TIMES DAILY AS NEEDED FOR ANXIETY 120 tablet 0     ASPIRIN ADULT LOW STRENGTH 81 MG EC tablet TAKE ONE TABLET BY MOUTH ONCE DAILY 90 tablet 1     citalopram (CELEXA) 40 MG tablet TAKE ONE TABLET BY MOUTH ONCE DAILY 30 tablet 8     doxepin (SINEQUAN) 10 MG/ML (HIGH CONC) solution Mouth pain: take 1.5 mL mixed with equal amount tap water. Swish for 60 sec then spit. Take every 4 h as needed. Sleep: take  0.6 mL at night. May repeat this once more overnight. Do not take more than 2 xanax overnight. 118 mL 0     guaiFENesin (MUCINEX) 600 MG 12 hr tablet Take 2 tablets (1,200 mg) by mouth 2 times daily 60 tablet 1     HYDROcodone-acetaminophen (NORCO)  MG per tablet Take 1-2 tablets by mouth every 4 hours as needed for severe pain (No more than 10 a day. Stop dilaudid.) 150 tablet 0     hydrOXYzine (VISTARIL) 25 MG capsule TAKE 1 CAPSULE (25 MG) BY MOUTH 4 TIMES DAILY AS NEEDED FOR ANXIETY 120 capsule 3     magic mouthwash (ENTER INGREDIENTS IN COMMENTS) suspension Take 10 mLs by mouth every 4 hours as needed (pain) Swish and spit 200 mL 1     methadone (DOLOPHINE) 5 MG tablet Take 1 tablet (5 mg) by mouth 4 times daily 120 tablet 0     metoprolol succinate ER (TOPROL-XL) 50 MG 24 hr tablet Take 1 tablet (50 mg) by mouth daily 90 tablet 3     naloxone (NARCAN) 4 MG/0.1ML nasal spray Spray 1 spray (4 mg) into one nostril alternating nostrils as needed for opioid reversal every 2-3 minutes until assistance arrives 0.2 mL      nitroGLYcerin (NITROSTAT) 0.4 MG sublingual tablet For chest pain place 1 tablet under the tongue every 5 minutes for 3 doses. If symptoms persist 5 minutes after 1st dose call 911. 25 tablet 0     omeprazole (PRILOSEC) 40 MG DR capsule Take 1 capsule (40 mg) by mouth daily 90 capsule 3     ondansetron (ZOFRAN-ODT) 8 MG ODT tab Take 1 tablet (8 mg) by mouth every 8 hours as needed for nausea 30 tablet 11     prochlorperazine (COMPAZINE) 10 MG tablet Take 0.5 tablets (5 mg) by mouth every 6 hours as needed (Nausea/Vomiting) 30 tablet 11     rosuvastatin (CRESTOR) 40 MG tablet Take 1 tablet (40 mg) by mouth daily 90 tablet 1     silver sulfADIAZINE (SILVADENE) 1 % external cream Apply topically daily 400 g 0     testosterone (ANDROGEL 1.62 % PUMP) 20.25 MG/ACT gel testosterone 20.25 mg/1.25 gram (1.62 %) transdermal gel pump       zolpidem (AMBIEN) 10 MG tablet TAKE ONE TABLET BY MOUTH EVERY  EVENING AS NEEDED FOR SLEEP 30 tablet 5     PHYSICAL EXAM  /83 (BP Location: Right arm, Patient Position: Sitting, Cuff Size: Adult Regular)   Pulse 82   Temp 99  F (37.2  C) (Oral)   Resp 16   Wt 94.7 kg (208 lb 11.2 oz)   SpO2 94%   BMI 31.27 kg/m    Wt Readings from Last 10 Encounters:   05/27/21 97.3 kg (214 lb 6.4 oz)   05/26/21 96.3 kg (212 lb 3.2 oz)   05/20/21 100.7 kg (222 lb)   05/19/21 99.9 kg (220 lb 4.8 oz)   05/14/21 103.5 kg (228 lb 3.2 oz)   05/13/21 103 kg (227 lb)   05/12/21 101.3 kg (223 lb 6.4 oz)   05/06/21 103.4 kg (228 lb)   05/05/21 102.2 kg (225 lb 4.8 oz)   04/29/21 105.7 kg (233 lb)   General: The patient is a pleasant male; fatigued and more flat affect that previously   HEENT: EOMI, PERRL. Sclerae are anicteric. Oral mucosa is pink, diffuse mucositis.   Lymph: nodes less palpable than prior  Heart: Regular rate and rhythm.   Lungs: Clear to auscultation bilaterally.   Abdomen: Bowel sounds present, soft, nontender  Extremities: No lower extremity edema noted bilaterally.   Neuro: Cranial nerves II through XII are grossly intact.  Skin: dermatitis to neck    LABORATORY AND IMAGING STUDIES     Ref. Range 6/2/2021 08:06   Sodium Latest Ref Range: 133 - 144 mmol/L 139   Potassium Latest Ref Range: 3.4 - 5.3 mmol/L 4.1   Chloride Latest Ref Range: 94 - 109 mmol/L 101   Carbon Dioxide Latest Ref Range: 20 - 32 mmol/L 30   Urea Nitrogen Latest Ref Range: 7 - 30 mg/dL 29   Creatinine Latest Ref Range: 0.66 - 1.25 mg/dL 1.58 (H)   GFR Estimate Latest Ref Range: >60 mL/min/1.73_m2 44 (L)   GFR Estimate If Black Latest Ref Range: >60 mL/min/1.73_m2 51 (L)   Calcium Latest Ref Range: 8.5 - 10.1 mg/dL 8.5   Anion Gap Latest Ref Range: 3 - 14 mmol/L 7   Magnesium Latest Ref Range: 1.6 - 2.3 mg/dL 1.2 (L)   Albumin Latest Ref Range: 3.4 - 5.0 g/dL 2.6 (L)   Protein Total Latest Ref Range: 6.8 - 8.8 g/dL 6.3 (L)   Bilirubin Total Latest Ref Range: 0.2 - 1.3 mg/dL 0.6   Alkaline Phosphatase  Latest Ref Range: 40 - 150 U/L 54   ALT Latest Ref Range: 0 - 70 U/L 23   AST Latest Ref Range: 0 - 45 U/L 23   Glucose Latest Ref Range: 70 - 99 mg/dL 100 (H)   WBC Latest Ref Range: 4.0 - 11.0 10e9/L 2.5 (L)   Hemoglobin Latest Ref Range: 13.3 - 17.7 g/dL 10.3 (L)   Hematocrit Latest Ref Range: 40.0 - 53.0 % 31.4 (L)   Platelet Count Latest Ref Range: 150 - 450 10e9/L 102 (L)   RBC Count Latest Ref Range: 4.4 - 5.9 10e12/L 3.62 (L)   MCV Latest Ref Range: 78 - 100 fl 87   MCH Latest Ref Range: 26.5 - 33.0 pg 28.5   MCHC Latest Ref Range: 31.5 - 36.5 g/dL 32.8   RDW Latest Ref Range: 10.0 - 15.0 % 14.4   Diff Method Unknown Automated Method   % Neutrophils Latest Units: % 65.7   % Lymphocytes Latest Units: % 8.1   % Monocytes Latest Units: % 25.0   % Eosinophils Latest Units: % 0.8   % Basophils Latest Units: % 0.4   % Immature Granulocytes Latest Units: % 0.0   Nucleated RBCs Latest Ref Range: 0 /100 0   Absolute Neutrophil Latest Ref Range: 1.6 - 8.3 10e9/L 1.6   Absolute Lymphocytes Latest Ref Range: 0.8 - 5.3 10e9/L 0.2 (L)   Absolute Monocytes Latest Ref Range: 0.0 - 1.3 10e9/L 0.6   Absolute Eosinophils Latest Ref Range: 0.0 - 0.7 10e9/L 0.0   Absolute Basophils Latest Ref Range: 0.0 - 0.2 10e9/L 0.0   Abs Immature Granulocytes Latest Ref Range: 0 - 0.4 10e9/L 0.0   Absolute Nucleated RBC Unknown 0.0     ASSESSMENT AND PLAN  SCC L tonsil, lW1N6O1, p16 +gardenia, ECS +gardenia: Currently in week 6 of chemoradiation. Having the expected toxicities, now with very limited PO intake and continued weight loss. New is having general FTT. Plan to hold cisplatin today (could make up next week) and admit to hospital for urgent PEG placement, tube feeding initiation, and IVF.  D/w Dr. Schrader. Unfortunately no beds available for direct admission so will send him to ED as I do not feel outpatient management is safe at this point. He has received 2L IVF and IV mag. I gave verbal hand off to ED provider Dr. Oshea.     CKD:  Creatinine high today, likely decreased PO intake and chemo. Holding cisplatin and giving IVF in clinic. Monitor for improvement and stability inpatient    Malnutrition, weight loss: ongoing, down another 7 lbs since last week. Barely getting any nutrition PO. we were working on getting PEG through thoracic surgery but at this point we are not sure it can happen this week and waiting until next week is not an option, hence admission for nutrition stability      Mucositis: s/s rinses and pain control as below.  HSV negative     Fever, chills: Intermittent temps to the 99 range mostly a few days after cisplatin. No other symptoms to suggest infection. HSV swab negative as above. Blood cultures and UA/UC negative from last week     Secretions: He's having a hard time while getting radiation. Could try guaifenessin once has tube.      Constipation: senna, colace, andmiralax, and take milk of magnesia prn.     Anticipated acute on chronic opioid use: Working with Dr. Serrano on this; methadone now at 10 mg TID. And 10 norco tabs max daily.     Anxiety, depression: Affect flat today. Has support of wife. On celexa and Xanax. Monitor. Would look to Dr. Serrano for futher recs      ASCVD:  See note by Dr. Ch, Cardiology, from 10/20/20. BP ok today. Per previous notes add metoprolol if his BP is consistently >140/90. CrCl okay to continue lisinopril.      60 minutes spent on the date of the encounter doing chart review, review of test results, interpretation of tests, patient visit, documentation, discussion with other provider(s) and discussion with family     Leisa Zarate CNP on 6/2/2021 at 12:21 PM

## 2021-05-28 NOTE — LETTER
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 Cycle 1, day 1 of weekly cisplatin.    2021: Weekly RT visit. Current dose of 424 cGy. Tolerating treatment well. No issues.    2021: Cycle 1, day 8 of weekly cisplatin.    2021: Weekly RT visit. Current dose of 1484 cGy. Tolerating treatment well.    2021: Cycle 1, day 15 of weekly cisplatin    2021: Weekly RT visit. Current dose of 2544 cGy. Moderate oropharyngeal pain.    2021: Cycle one, day 22 of weekly cisplatin    2021: Weekly RT visit. Current dose of 3604 cGy. Moderate to severe odynophagia. 2 kg weight loss over the past week.    2021: Cycle 1, day 29 of weekly cisplatin. Weekly RT visit. Current dose of 4664 cGy. Severe oral cavity and oropharyngeal pain. 3 kg weight loss over the past week.    ED visits/Hospitalizations:  1. 2021: Presentation to ED with low-grade fevers (99.6-100.1  F) and tachycardia to the low 100-120s. Work-up negative. Symptoms improved with IV fluid support and pain medication.    Missed Treatments:  None    Subjective: Mr. Murphy presents to clinic today for his weekly on-treatment visit.  His biggest complaint today is related to moderate to severe oral cavity and oropharyngeal pain which he rates as an 8-9/10 in severity along with thickened oral cavity secretions. He is currently using methadone 5 mg every 6 hours with hydrocodone 2 tabs every 5-6 h for control  of his symptoms. His p.o. intake has been minimal over the past week with small amounts of liquids and puréed foods by mouth. His weight is down approximately 3 kg over the past week as well. Medical oncology has scheduled him for PEG placement given his weight loss over the past 2 weeks.    ROS:   Constitutional  Pain (0-10): 8 (mouth), 9 (throat), 8 (skin)  Fatigue: Moderate to severe    CNS  Headaches: None    ENT  Mucositis: Severe    Cardiopulmonary  Dyspnea: None    GI  Nausea/vomiting: Mild nausea without vomiting    Nutrition  PEG: No  Diet: Puréed/liquid    Integumentary  Dermatitis: Moderate    Objective:   Wt Readings from Last 4 Encounters:   05/27/21 97.3 kg (214 lb 6.4 oz)   05/26/21 96.3 kg (212 lb 3.2 oz)   05/20/21 100.7 kg (222 lb)   05/19/21 99.9 kg (220 lb 4.8 oz)       Last week's weight: 100.7 kg  Starting weight: 105.7 kg    BP sitting 95/68 repeated sitting and was 117/75 p-78  Standing 124/78 repeat 116/74 p-80    General: Moderately fatigued-appearing 70-year-old gentleman seated in an examination chair in mild discomfort secondary to oral cavity/oropharyngeal pain.  HEENT: NC/AT.  EOMI.  No rhinorrhea or epistaxis.  Moderately thickened oral cavity secretions which pooled within the anterior floor of mouth.  Confluent mucositis predominantly involving the left lateral oral tongue, left RMT, left soft palate and posterior oropharyngeal wall.  No evidence of oral thrush.  Pulmonary: No wheezing, stridor or respiratory distress  Skin: Moderate erythema of the bilateral neck without desquamation.  There is yellow crusting scattered where blistered skin opened.      Treatment-related toxicities (CTCAE v5.0):    Mucositis: Grade 2    Dermatitis: Grade 2    Assessment:    Mr. 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 He is receiving curative-intent concurrent chemoradiation with weekly cisplatin. He has developed worsening mucositis  and associated decreased p.o. intake over the past week.    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  He will try hard at this.    IV fluids not available over the holiday weekend in [place].  If he is not able to take in adequate fluids, then he should go to ER in [place].    Dermatitis:    Continue twice daily Aquaphor use to the lower face bilateral neck    He does have blisters that opened and have a yellow crust to them now.  Silvadene given to use BID with aquaphor over where the blisters opened.    Mucositis:    Pain control as described above    Continue frequent salt/soda rinses    Anxiety:    Continue Celexa    Mosaiq chart and setup information reviewed  IGRT images reviewed    Medication list reviewed    Maribell Martines NP  Dept of Radiation Oncology  [facility]       MARCY Tafoya CNP

## 2021-05-28 NOTE — LETTER
2021         RE: Quan Murphy   [address] [facility] [address]        Dear Colleague,    Thank you for referring your patient, Quan Murphy, to the [facility] RADIATION ONCOLOGY. Please see a copy of my visit note below.    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 Cycle 1, day 1 of weekly cisplatin.    2021: Weekly RT visit. Current dose of 424 cGy. Tolerating treatment well. No issues.    2021: Cycle 1, day 8 of weekly cisplatin.    2021: Weekly RT visit. Current dose of 1484 cGy. Tolerating treatment well.    2021: Cycle 1, day 15 of weekly cisplatin    2021: Weekly RT visit. Current dose of 2544 cGy. Moderate oropharyngeal pain.    2021: Cycle one, day 22 of weekly cisplatin    2021: Weekly RT visit. Current dose of 3604 cGy. Moderate to severe odynophagia. 2 kg weight loss over the past week.    2021: Cycle 1, day 29 of weekly cisplatin. Weekly RT visit. Current dose of 4664 cGy. Severe oral cavity and oropharyngeal pain. 3 kg weight loss over the past week.    ED visits/Hospitalizations:  1. 2021: Presentation to ED with low-grade fevers (99.6-100.1  F) and tachycardia to the low 100-120s. Work-up negative. Symptoms improved with IV fluid support and pain medication.    Missed Treatments:  None    Subjective: Mr. Murphy presents to clinic today for his weekly on-treatment visit.  His biggest complaint today is related to moderate to severe oral cavity and oropharyngeal pain which  he rates as an 8-9/10 in severity along with thickened oral cavity secretions. He is currently using methadone 5 mg every 6 hours with hydrocodone 2 tabs every 5-6 h for control of his symptoms. His p.o. intake has been minimal over the past week with small amounts of liquids and puréed foods by mouth. His weight is down approximately 3 kg over the past week as well. Medical oncology has scheduled him for PEG placement given his weight loss over the past 2 weeks.    ROS:   Constitutional  Pain (0-10): 8 (mouth), 9 (throat), 8 (skin)  Fatigue: Moderate to severe    CNS  Headaches: None    ENT  Mucositis: Severe    Cardiopulmonary  Dyspnea: None    GI  Nausea/vomiting: Mild nausea without vomiting    Nutrition  PEG: No  Diet: Puréed/liquid    Integumentary  Dermatitis: Moderate    Objective:   Wt Readings from Last 4 Encounters:   05/27/21 97.3 kg (214 lb 6.4 oz)   05/26/21 96.3 kg (212 lb 3.2 oz)   05/20/21 100.7 kg (222 lb)   05/19/21 99.9 kg (220 lb 4.8 oz)       Last week's weight: 100.7 kg  Starting weight: 105.7 kg    BP sitting 95/68 repeated sitting and was 117/75 p-78  Standing 124/78 repeat 116/74 p-80    General: Moderately fatigued-appearing 70-year-old gentleman seated in an examination chair in mild discomfort secondary to oral cavity/oropharyngeal pain.  HEENT: NC/AT.  EOMI.  No rhinorrhea or epistaxis.  Moderately thickened oral cavity secretions which pooled within the anterior floor of mouth.  Confluent mucositis predominantly involving the left lateral oral tongue, left RMT, left soft palate and posterior oropharyngeal wall.  No evidence of oral thrush.  Pulmonary: No wheezing, stridor or respiratory distress  Skin: Moderate erythema of the bilateral neck without desquamation.  There is yellow crusting scattered where blistered skin opened.      Treatment-related toxicities (CTCAE v5.0):    Mucositis: Grade 2    Dermatitis: Grade 2    Assessment:    Mr. 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 He is receiving curative-intent concurrent chemoradiation with weekly cisplatin. He has developed worsening mucositis and associated decreased p.o. intake over the past week.    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  He will try hard at this.    IV fluids not available over the holiday weekend in [place].  If he is not able to take in adequate fluids, then he should go to ER in [place].    Dermatitis:    Continue twice daily Aquaphor use to the lower face bilateral neck    He does have blisters that opened and have a yellow crust to them now.  Silvadene given to use BID with aquaphor over where the blisters opened.    Mucositis:    Pain control as described above    Continue frequent salt/soda rinses    Anxiety:    Continue Celexa    Mosaiq chart and setup information reviewed  IGRT images reviewed    Medication list reviewed    Maribell Martines NP  Dept of Radiation Oncology  [facility]       Again, thank you for allowing me to participate in the care of your patient.        Sincerely,        MARCY Tafoya CNP

## 2021-06-01 ENCOUNTER — APPOINTMENT (OUTPATIENT)
Dept: RADIATION ONCOLOGY | Facility: CLINIC | Age: 71
End: 2021-06-01
Attending: RADIOLOGY
Payer: MEDICARE

## 2021-06-01 ENCOUNTER — VIRTUAL VISIT (OUTPATIENT)
Dept: ONCOLOGY | Facility: CLINIC | Age: 71
End: 2021-06-01
Payer: MEDICARE

## 2021-06-01 ENCOUNTER — PATIENT OUTREACH (OUTPATIENT)
Dept: PALLIATIVE CARE | Facility: CLINIC | Age: 71
End: 2021-06-01

## 2021-06-01 VITALS — WEIGHT: 209 LBS | BODY MASS INDEX: 31.32 KG/M2 | DIASTOLIC BLOOD PRESSURE: 82 MMHG | SYSTOLIC BLOOD PRESSURE: 145 MMHG

## 2021-06-01 DIAGNOSIS — N18.2 CKD (CHRONIC KIDNEY DISEASE) STAGE 2, GFR 60-89 ML/MIN: Primary | ICD-10-CM

## 2021-06-01 DIAGNOSIS — Z91.89 AT RISK FOR PROLONGED QT INTERVAL SYNDROME: ICD-10-CM

## 2021-06-01 DIAGNOSIS — C09.9 SQUAMOUS CELL CARCINOMA OF LEFT TONSIL (H): Primary | ICD-10-CM

## 2021-06-01 DIAGNOSIS — C09.9 SQUAMOUS CELL CARCINOMA OF LEFT TONSIL (H): ICD-10-CM

## 2021-06-01 DIAGNOSIS — E44.0 MODERATE PROTEIN-CALORIE MALNUTRITION (H): ICD-10-CM

## 2021-06-01 DIAGNOSIS — G89.3 NEOPLASM RELATED PAIN: ICD-10-CM

## 2021-06-01 LAB — INTERPRETATION ECG - MUSE: NORMAL

## 2021-06-01 PROCEDURE — 98967 PH1 ASSMT&MGMT NQHP 11-20: CPT | Performed by: DIETITIAN, REGISTERED

## 2021-06-01 PROCEDURE — 77386 HC IMRT TREATMENT DELIVERY, COMPLEX: CPT | Performed by: RADIOLOGY

## 2021-06-01 RX ORDER — METHADONE HYDROCHLORIDE 5 MG/1
10 TABLET ORAL 3 TIMES DAILY
Qty: 120 TABLET | Refills: 0 | Status: ON HOLD | COMMUNITY
Start: 2021-06-01 | End: 2021-06-09

## 2021-06-01 NOTE — PROGRESS NOTES
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 Cycle 1, day 1 of weekly cisplatin.    2021: Weekly RT visit. Current dose of 424 cGy. Tolerating treatment well. No issues.    2021: Cycle 1, day 8 of weekly cisplatin.    2021: Weekly RT visit. Current dose of 1484 cGy. Tolerating treatment well.    2021: Cycle 1, day 15 of weekly cisplatin    2021: Weekly RT visit. Current dose of 2544 cGy. Moderate oropharyngeal pain.    2021: Cycle one, day 22 of weekly cisplatin    2021: Weekly RT visit. Current dose of 3604 cGy. Moderate to severe odynophagia. 2 kg weight loss over the past week.    2021: Cycle 1, day 29 of weekly cisplatin. Weekly RT visit. Current dose of 4664 cGy. Severe oral cavity and oropharyngeal pain. 3 kg weight loss over the past week.    ED visits/Hospitalizations:  1. 2021: Presentation to ED with low-grade fevers (99.6-100.1  F) and tachycardia to the low 100-120s. Work-up negative. Symptoms improved with IV fluid support and pain medication.    Missed Treatments:  None    Subjective: Mr. Murphy presents to clinic today for his weekly on-treatment visit.  His biggest complaint today is related to moderate to severe oral cavity and oropharyngeal pain which he rates as an 8-9/10 in severity along with thickened oral cavity secretions. He is currently using methadone 5 mg every 6 hours with hydrocodone 2 tabs every 5-6 h for control of his symptoms.  He feels that his pain is intolerable and a major part of  why he can't eat. His p.o. intake has been minimal over the past week with small amounts of liquids and puréed foods by mouth. He is doing 1340 shakes. He continues to lose weight.. Medical oncology has scheduled him for PEG placement given his weight loss over the past 2 weeks.This has not been scheduled yet.  He gets chemo and fluids tomorrow and probably needs fluids a couple times a week.    ROS:   Constitutional  Pain (0-10): 8 (mouth), 9 (throat), 8 (skin)  Fatigue: Moderate to severe    CNS  Headaches: None    ENT  Mucositis: Severe    Cardiopulmonary  Dyspnea: None    GI  Nausea/vomiting: Mild nausea without vomiting    Nutrition  PEG: No  Diet: Puréed/liquid    Integumentary  Dermatitis: Moderate    Objective:   Wt Readings from Last 4 Encounters:   06/01/21 94.8 kg (209 lb)   05/28/21 97.6 kg (215 lb 1.6 oz)   05/27/21 97.3 kg (214 lb 6.4 oz)   05/26/21 96.3 kg (212 lb 3.2 oz)     Last week's weight: 100.7 kg  Starting weight: 105.7 kg    BP sitting 95/68 repeated sitting and was 117/75 p-78  Standing 124/78 repeat 116/74 p-80    General: Moderately fatigued-appearing 70-year-old gentleman seated in an examination chair in mild discomfort secondary to oral cavity/oropharyngeal pain.  He appears to be in more pain today and irritable.  HEENT: NC/AT.  EOMI.  No rhinorrhea or epistaxis.  Moderately thickened oral cavity secretions which pooled within the anterior floor of mouth.  Confluent mucositis predominantly involving the left lateral oral tongue, left RMT, left soft palate and posterior oropharyngeal wall.  No evidence of oral thrush.  Pulmonary: No wheezing, stridor or respiratory distress  Skin: Moderate erythema of the bilateral neck without desquamation.        Treatment-related toxicities (CTCAE v5.0):    Mucositis: Grade 2    Dermatitis: Grade 2    Assessment:    Mr. 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 He is receiving  curative-intent concurrent chemoradiation with weekly cisplatin. He has developed worsening mucositis and associated decreased p.o. intake over the past week.    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  I did message them that I think his pain needs to be addressed as it continues to increase as we go through treatment.    Fluids/Electrolytes/Nutrition:    Agree with PEG placement    Recommended taking small but frequent quantities of high caloric foods to minimize weight loss in the interim    Recommended 2 shakes of 1340 until PEG placement.  He will try hard at this.    I think he should have IV fluids scheduled routinely until we get his PEG. Will discuss with med onc.    Dermatitis:    Continue twice daily Aquaphor use to the lower face bilateral neck    He does have blisters that opened and have a yellow crust to them now.  Silvadene given to use BID with aquaphor over where the blisters opened.    Mucositis:    Pain control as described above    Continue frequent salt/soda rinses    Anxiety:    Continue Celexa    Mosaiq chart and setup information reviewed  IGRT images reviewed    Medication list reviewed    Maribell Martines NP  Dept of Radiation Oncology  [facility]

## 2021-06-01 NOTE — TELEPHONE ENCOUNTER
FUTURE VISIT INFORMATION      SURGERY INFORMATION:    Date: TBD- Oncology    Consult: Virtual visit 21    RECORDS REQUESTED FROM:       Primary Care Provider: Jose Hernandez MD- [facility]    Pertinent Medical History: hypertension, syncope    Most recent EKG+ Tracin21    Most recent ECHO:  16

## 2021-06-01 NOTE — PROGRESS NOTES
"The patient has been notified of the following:      \"We have found that certain health care needs can be provided without the need for a face to face visit.  This service lets us provide the care you need with a phone conversation.       I will have full access to your [place] medical record during this entire phone call.   I will be taking notes for your medical record.      Since this is like an office visit, we will bill your insurance company for this service.       There are potential benefits and risks of telephone visits (e.g. limits to patient confidentiality) that differ from in-person visits.?  Confidentiality still applies for telephone services, and nobody will record the visit.  It is important to be in a quiet, private space that is free of distractions (including cell phone or other devices) during the visit.??      If during the course of the call I believe a telephone visit is not appropriate, you will not be charged for this service\"     Consent has been obtained for this service by care team member: Yes     CLINICAL NUTRITION SERVICES - REASSESSMENT NOTE   EVALUATION OF PREVIOUS PLAN OF CARE:   Referring Physician: Itzel  Time spent with patient: 15 minutes.    Current diet: nutrition shakes, soft foods only  Current appetite/intake: poor  PEG Tube: Pending placement       Monitoring from previous assessment:   -Food/ONS intake and tolerance - taking 2/3 to 1 full 1340 debby shake daily.  He has not been able to get more than one shake in each day. His wife is making them with [employer], scandi shake and ice cream.  Each shake is ~1500 debby, 50g protein.   He expresses his concern with increased difficulty taking in these shakes due to gag relfex.   -Weight trends -   Wt Readings from Last 6 Encounters:   05/28/21 97.6 kg (215 lb 1.6 oz)   05/27/21 97.3 kg (214 lb 6.4 oz)   05/26/21 96.3 kg (212 lb 3.2 oz)   05/20/21 100.7 kg (222 lb)   05/19/21 99.9 kg (220 lb 4.8 oz)   05/14/21 103.5 kg (228 lb " 3.2 oz)     Previous Goals:   1.  Aim for 2-3 Bulk 1340 shakes/day  2.  Aim for >10 cups water/electrolyte fluids/day  3. Weight maintenance   Evaluation: Not met   Previous Nutrition Diagnosis:   Inadequate oral intake related to odynophagia, mucositis and dysgeusia as evidenced by 6 lb wt loss x past one week.    Evaluation: Declining   NEW FINDINGS:   6% wt loss   CURRENT NUTRITION DIAGNOSIS   Inadequate oral intake related to odynophagia, mucositis and dysgeusia as evidenced by 6% wt loss x past four weeks    INTERVENTIONS   RECOMMENDATIONS FOR MD/PROVIDER TO ORDER DUE TO INABILITY TO MEET NUTRITION NEEDS ORALLY:   Enteral Nutrition   Formula: Isosource 1.5 debby  Volume: 6 cartons/day (1500mL, 1140ml free water)  Provisions:  2250kcal (29kcal/kg), 96g protein (1.2g/kg), 322g CHO, 31g fiber  Dosing wt: 78kg    Suggested Tube feeding schedule via gravity bag feedings  Day 1: 1 carton formula TID spread 3-4 hours apart   Day 2: 1 1/2 cartons formula TID spread 3-4 hours apart   Day 3: 2 cartons formula TID spread 3-4 hours apart    Flush with 30-60mL water before and after each feeding  Flush with additional 120 mL water QID to meet 100% hydration      Implementation  Medical Food Supplement - reviewed current intake and encouraged to consume at least 75% of estimated nutrition needs (1800 debby, 75g protein/day). Reviewed nutrition composition of home made shakes and ways to thin them out to prevent gag reflex.  Enteral Nutrition - reviewed EN via PEG tube. Encouraged FT placement if not able to meet at least 75% estimated nutrition needs x next 1-2 days.    Encouraged to inquire about IFV 2-3 times/week throughout remainder of treatment on 1-2 weeks post treatment.   Collaboration with Maribell BANKS regarding PEG inquiry and recs for IVF.  Goals   1. Aim for >75% of estimated nutrition needs (1800kcal, 75g protein)  2. Aim for at least 10 cups water/electrolyte fluids/day  3. Weight stability; <1kg wt  loss/week    MONITORING AND EVALUATION:  -Food/beverage intake versus need for PEG tube  -Weight trends     Crystal Ramos RD, LD

## 2021-06-01 NOTE — LETTER
"    6/1/2021         RE: Quan Murphy  [address] [place] [address]        Dear Colleague,    Thank you for referring your patient, Quan Murphy, to the [facility]. Please see a copy of my visit note below.    The patient has been notified of the following:      \"We have found that certain health care needs can be provided without the need for a face to face visit.  This service lets us provide the care you need with a phone conversation.       I will have full access to your [place] medical record during this entire phone call.   I will be taking notes for your medical record.      Since this is like an office visit, we will bill your insurance company for this service.       There are potential benefits and risks of telephone visits (e.g. limits to patient confidentiality) that differ from in-person visits.?  Confidentiality still applies for telephone services, and nobody will record the visit.  It is important to be in a quiet, private space that is free of distractions (including cell phone or other devices) during the visit.??      If during the course of the call I believe a telephone visit is not appropriate, you will not be charged for this service\"     Consent has been obtained for this service by care team member: Yes     CLINICAL NUTRITION SERVICES - REASSESSMENT NOTE   EVALUATION OF PREVIOUS PLAN OF CARE:   Referring Physician: Itzel  Time spent with patient: 15 minutes.    Current diet: nutrition shakes, soft foods only  Current appetite/intake: poor  PEG Tube: Pending placement       Monitoring from previous assessment:   -Food/ONS intake and tolerance - taking 2/3 to 1 full 1340 debby shake daily.  He has not been able to get more than one shake in each day. His wife is making them with [employer], scandi shake and ice cream.  Each shake is ~1500 debby, 50g protein.   He expresses his concern with increased difficulty taking in these shakes due to gag relfex. "   -Weight trends -   Wt Readings from Last 6 Encounters:   05/28/21 97.6 kg (215 lb 1.6 oz)   05/27/21 97.3 kg (214 lb 6.4 oz)   05/26/21 96.3 kg (212 lb 3.2 oz)   05/20/21 100.7 kg (222 lb)   05/19/21 99.9 kg (220 lb 4.8 oz)   05/14/21 103.5 kg (228 lb 3.2 oz)     Previous Goals:   1.  Aim for 2-3 Bulk 1340 shakes/day  2.  Aim for >10 cups water/electrolyte fluids/day  3. Weight maintenance   Evaluation: Not met   Previous Nutrition Diagnosis:   Inadequate oral intake related to odynophagia, mucositis and dysgeusia as evidenced by 6 lb wt loss x past one week.    Evaluation: Declining   NEW FINDINGS:   6% wt loss   CURRENT NUTRITION DIAGNOSIS   Inadequate oral intake related to odynophagia, mucositis and dysgeusia as evidenced by 6% wt loss x past four weeks    INTERVENTIONS   RECOMMENDATIONS FOR MD/PROVIDER TO ORDER DUE TO INABILITY TO MEET NUTRITION NEEDS ORALLY:   Enteral Nutrition   Formula: Isosource 1.5 debby  Volume: 6 cartons/day (1500mL, 1140ml free water)  Provisions:  2250kcal (29kcal/kg), 96g protein (1.2g/kg), 322g CHO, 31g fiber  Dosing wt: 78kg    Suggested Tube feeding schedule via gravity bag feedings  Day 1: 1 carton formula TID spread 3-4 hours apart   Day 2: 1 1/2 cartons formula TID spread 3-4 hours apart   Day 3: 2 cartons formula TID spread 3-4 hours apart    Flush with 30-60mL water before and after each feeding  Flush with additional 120 mL water QID to meet 100% hydration      Implementation  Medical Food Supplement - reviewed current intake and encouraged to consume at least 75% of estimated nutrition needs (1800 debby, 75g protein/day). Reviewed nutrition composition of home made shakes and ways to thin them out to prevent gag reflex.  Enteral Nutrition - reviewed EN via PEG tube. Encouraged FT placement if not able to meet at least 75% estimated nutrition needs x next 1-2 days.    Encouraged to inquire about IFV 2-3 times/week throughout remainder of treatment on 1-2 weeks post treatment.    Collaboration with Maribell BANKS regarding PEG inquiry and recs for IVF.  Goals   1. Aim for >75% of estimated nutrition needs (1800kcal, 75g protein)  2. Aim for at least 10 cups water/electrolyte fluids/day  3. Weight stability; <1kg wt loss/week    MONITORING AND EVALUATION:  -Food/beverage intake versus need for PEG tube  -Weight trends     Crystal Ramos RD, LD      Again, thank you for allowing me to participate in the care of your patient.        Sincerely,        Crystal Ramos RD

## 2021-06-01 NOTE — PROGRESS NOTES
"Received VM from patient's wife asking me to reach out again - they were in apts earlier when I tried to call to check in.     Called him back and was able to reach him. He reports that he is still having \"unbelievable pain.\" Notes he thinks meds need to be adjusted. He reports that there isn't a moment in the day that he is not hurting.     Confirms he is taking methadone 1 tab QID + #10 total norco daily, has not been able to decrease that at all.     Denies side effects. Says BMs are going well using Senna-s, having daily BM and feels he is emptying his bowels. Not using miralax, says he hasn't needed that yet.     He is hoping to bring the pain down a little more and that that might help him eat as he hasn't really been able to.     Advised I would update Dr. Serrano and follow up with recommendations. He was okay with that plan.   "

## 2021-06-01 NOTE — LETTER
2021         RE: Quan Murphy   [address] [facility] [address]        Dear Colleague,    Thank you for referring your patient, Quan Murphy, to the [facility] RADI[facility]. Please see a copy of my visit note below.    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 Cycle 1, day 1 of weekly cisplatin.    2021: Weekly RT visit. Current dose of 424 cGy. Tolerating treatment well. No issues.    2021: Cycle 1, day 8 of weekly cisplatin.    2021: Weekly RT visit. Current dose of 1484 cGy. Tolerating treatment well.    2021: Cycle 1, day 15 of weekly cisplatin    2021: Weekly RT visit. Current dose of 2544 cGy. Moderate oropharyngeal pain.    2021: Cycle one, day 22 of weekly cisplatin    2021: Weekly RT visit. Current dose of 3604 cGy. Moderate to severe odynophagia. 2 kg weight loss over the past week.    2021: Cycle 1, day 29 of weekly cisplatin. Weekly RT visit. Current dose of 4664 cGy. Severe oral cavity and oropharyngeal pain. 3 kg weight loss over the past week.    ED visits/Hospitalizations:  1. 2021: Presentation to ED with low-grade fevers (99.6-100.1  F) and tachycardia to the low 100-120s. Work-up negative. Symptoms improved with IV fluid support and pain medication.    Missed Treatments:  None    Subjective: Mr. Murphy presents to clinic today for his weekly on-treatment visit.  His biggest complaint today is related to moderate to severe oral cavity and oropharyngeal pain which  he rates as an 8-9/10 in severity along with thickened oral cavity secretions. He is currently using methadone 5 mg every 6 hours with hydrocodone 2 tabs every 5-6 h for control of his symptoms.  He feels that his pain is intolerable and a major part of why he can't eat. His p.o. intake has been minimal over the past week with small amounts of liquids and puréed foods by mouth. He is doing 1340 shakes. He continues to lose weight.. Medical oncology has scheduled him for PEG placement given his weight loss over the past 2 weeks.This has not been scheduled yet.  He gets chemo and fluids tomorrow and probably needs fluids a couple times a week.    ROS:   Constitutional  Pain (0-10): 8 (mouth), 9 (throat), 8 (skin)  Fatigue: Moderate to severe    CNS  Headaches: None    ENT  Mucositis: Severe    Cardiopulmonary  Dyspnea: None    GI  Nausea/vomiting: Mild nausea without vomiting    Nutrition  PEG: No  Diet: Puréed/liquid    Integumentary  Dermatitis: Moderate    Objective:   Wt Readings from Last 4 Encounters:   06/01/21 94.8 kg (209 lb)   05/28/21 97.6 kg (215 lb 1.6 oz)   05/27/21 97.3 kg (214 lb 6.4 oz)   05/26/21 96.3 kg (212 lb 3.2 oz)     Last week's weight: 100.7 kg  Starting weight: 105.7 kg    BP sitting 95/68 repeated sitting and was 117/75 p-78  Standing 124/78 repeat 116/74 p-80    General: Moderately fatigued-appearing 70-year-old gentleman seated in an examination chair in mild discomfort secondary to oral cavity/oropharyngeal pain.  He appears to be in more pain today and irritable.  HEENT: NC/AT.  EOMI.  No rhinorrhea or epistaxis.  Moderately thickened oral cavity secretions which pooled within the anterior floor of mouth.  Confluent mucositis predominantly involving the left lateral oral tongue, left RMT, left soft palate and posterior oropharyngeal wall.  No evidence of oral thrush.  Pulmonary: No wheezing, stridor or respiratory distress  Skin: Moderate erythema of the bilateral neck without  desquamation.        Treatment-related toxicities (CTCAE v5.0):    Mucositis: Grade 2    Dermatitis: Grade 2    Assessment:    Mr. 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 He is receiving curative-intent concurrent chemoradiation with weekly cisplatin. He has developed worsening mucositis and associated decreased p.o. intake over the past week.    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  I did message them that I think his pain needs to be addressed as it continues to increase as we go through treatment.    Fluids/Electrolytes/Nutrition:    Agree with PEG placement    Recommended taking small but frequent quantities of high caloric foods to minimize weight loss in the interim    Recommended 2 shakes of 1340 until PEG placement.  He will try hard at this.    I think he should have IV fluids scheduled routinely until we get his PEG. Will discuss with med onc.    Dermatitis:    Continue twice daily Aquaphor use to the lower face bilateral neck    He does have blisters that opened and have a yellow crust to them now.  Silvadene given to use BID with aquaphor over where the blisters opened.    Mucositis:    Pain control as described above    Continue frequent salt/soda rinses    Anxiety:    Continue Celexa    Mosaiq chart and setup information reviewed  IGRT images reviewed    Medication list reviewed    Maribell Martines NP  Dept of Radiation Oncology  [facility]       Again, thank you for allowing me to participate in the care of your patient.        Sincerely,        MARCY Tafoya CNP

## 2021-06-01 NOTE — PROGRESS NOTES
Attempted to call patient to check in following apt last week, but unable to reach him. I did leave a VM asking for call back - phone seemed to go straight to VM.     Called patient's wife in addition to patient's phone #, also left VM asking for call back and with my direct phone #.

## 2021-06-01 NOTE — LETTER
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 Cycle 1, day 1 of weekly cisplatin.    2021: Weekly RT visit. Current dose of 424 cGy. Tolerating treatment well. No issues.    2021: Cycle 1, day 8 of weekly cisplatin.    2021: Weekly RT visit. Current dose of 1484 cGy. Tolerating treatment well.    2021: Cycle 1, day 15 of weekly cisplatin    2021: Weekly RT visit. Current dose of 2544 cGy. Moderate oropharyngeal pain.    2021: Cycle one, day 22 of weekly cisplatin    2021: Weekly RT visit. Current dose of 3604 cGy. Moderate to severe odynophagia. 2 kg weight loss over the past week.    2021: Cycle 1, day 29 of weekly cisplatin. Weekly RT visit. Current dose of 4664 cGy. Severe oral cavity and oropharyngeal pain. 3 kg weight loss over the past week.    ED visits/Hospitalizations:  1. 2021: Presentation to ED with low-grade fevers (99.6-100.1  F) and tachycardia to the low 100-120s. Work-up negative. Symptoms improved with IV fluid support and pain medication.    Missed Treatments:  None    Subjective: Mr. Murphy presents to clinic today for his weekly on-treatment visit.  His biggest complaint today is related to moderate to severe oral cavity and oropharyngeal pain which he rates as an 8-9/10 in severity along with thickened oral cavity secretions. He is currently using methadone 5 mg every 6 hours with hydrocodone 2 tabs every 5-6 h for control  of his symptoms.  He feels that his pain is intolerable and a major part of why he can't eat. His p.o. intake has been minimal over the past week with small amounts of liquids and puréed foods by mouth. He is doing 1340 shakes. He continues to lose weight.. Medical oncology has scheduled him for PEG placement given his weight loss over the past 2 weeks.This has not been scheduled yet.  He gets chemo and fluids tomorrow and probably needs fluids a couple times a week.    ROS:   Constitutional  Pain (0-10): 8 (mouth), 9 (throat), 8 (skin)  Fatigue: Moderate to severe    CNS  Headaches: None    ENT  Mucositis: Severe    Cardiopulmonary  Dyspnea: None    GI  Nausea/vomiting: Mild nausea without vomiting    Nutrition  PEG: No  Diet: Puréed/liquid    Integumentary  Dermatitis: Moderate    Objective:   Wt Readings from Last 4 Encounters:   06/01/21 94.8 kg (209 lb)   05/28/21 97.6 kg (215 lb 1.6 oz)   05/27/21 97.3 kg (214 lb 6.4 oz)   05/26/21 96.3 kg (212 lb 3.2 oz)     Last week's weight: 100.7 kg  Starting weight: 105.7 kg    BP sitting 95/68 repeated sitting and was 117/75 p-78  Standing 124/78 repeat 116/74 p-80    General: Moderately fatigued-appearing 70-year-old gentleman seated in an examination chair in mild discomfort secondary to oral cavity/oropharyngeal pain.  He appears to be in more pain today and irritable.  HEENT: NC/AT.  EOMI.  No rhinorrhea or epistaxis.  Moderately thickened oral cavity secretions which pooled within the anterior floor of mouth.  Confluent mucositis predominantly involving the left lateral oral tongue, left RMT, left soft palate and posterior oropharyngeal wall.  No evidence of oral thrush.  Pulmonary: No wheezing, stridor or respiratory distress  Skin: Moderate erythema of the bilateral neck without desquamation.        Treatment-related toxicities (CTCAE v5.0):    Mucositis: Grade 2    Dermatitis: Grade 2    Assessment:    Mr. Murphy is a 70 year old male with a cT2 N2 M0 p16  positive squamous cell carcinoma of the left tonsil. He is receiving curative-intent concurrent chemoradiation with weekly cisplatin. He has developed worsening mucositis and associated decreased p.o. intake over the past week.    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  I did message them that I think his pain needs to be addressed as it continues to increase as we go through treatment.    Fluids/Electrolytes/Nutrition:    Agree with PEG placement    Recommended taking small but frequent quantities of high caloric foods to minimize weight loss in the interim    Recommended 2 shakes of 1340 until PEG placement.  He will try hard at this.    I think he should have IV fluids scheduled routinely until we get his PEG. Will discuss with med onc.    Dermatitis:    Continue twice daily Aquaphor use to the lower face bilateral neck    He does have blisters that opened and have a yellow crust to them now.  Silvadene given to use BID with aquaphor over where the blisters opened.    Mucositis:    Pain control as described above    Continue frequent salt/soda rinses    Anxiety:    Continue Celexa    Mosaiq chart and setup information reviewed  IGRT images reviewed    Medication list reviewed    Maribell Martines NP  Dept of Radiation Oncology  [facility]       MARCY Tafoya CNP

## 2021-06-01 NOTE — PROGRESS NOTES
Called patient and advised of methadone dose increase recommendation from MD below. Reiterated several time we were changing from QID dosing to TID dosing. Reviewed at length he should take methadone 2 tabs TID. He verbalized understanding and repeated my instructions back to me. We agreed to check in Friday, asked him to call sooner with any changes/questions/concerns.

## 2021-06-02 ENCOUNTER — APPOINTMENT (OUTPATIENT)
Dept: LAB | Facility: CLINIC | Age: 71
DRG: 640 | End: 2021-06-02
Attending: INTERNAL MEDICINE
Payer: MEDICARE

## 2021-06-02 ENCOUNTER — HOSPITAL ENCOUNTER (INPATIENT)
Facility: CLINIC | Age: 71
LOS: 7 days | Discharge: HOME | DRG: 640 | End: 2021-06-09
Attending: INTERNAL MEDICINE | Admitting: STUDENT IN AN ORGANIZED HEALTH CARE EDUCATION/TRAINING PROGRAM
Payer: MEDICARE

## 2021-06-02 ENCOUNTER — THERAPY VISIT (OUTPATIENT)
Dept: SPEECH THERAPY | Facility: CLINIC | Age: 71
End: 2021-06-02
Payer: MEDICARE

## 2021-06-02 ENCOUNTER — ONCOLOGY VISIT (OUTPATIENT)
Dept: ONCOLOGY | Facility: CLINIC | Age: 71
DRG: 640 | End: 2021-06-02
Attending: INTERNAL MEDICINE
Payer: MEDICARE

## 2021-06-02 VITALS
OXYGEN SATURATION: 94 % | RESPIRATION RATE: 16 BRPM | TEMPERATURE: 99 F | HEART RATE: 82 BPM | DIASTOLIC BLOOD PRESSURE: 83 MMHG | SYSTOLIC BLOOD PRESSURE: 135 MMHG | WEIGHT: 208.7 LBS | BODY MASS INDEX: 31.27 KG/M2

## 2021-06-02 DIAGNOSIS — F41.9 ANXIETY: ICD-10-CM

## 2021-06-02 DIAGNOSIS — C09.9 SQUAMOUS CELL CARCINOMA OF LEFT TONSIL (H): ICD-10-CM

## 2021-06-02 DIAGNOSIS — C09.9 SQUAMOUS CELL CARCINOMA OF LEFT TONSIL (H): Primary | ICD-10-CM

## 2021-06-02 DIAGNOSIS — N18.2 CKD (CHRONIC KIDNEY DISEASE) STAGE 2, GFR 60-89 ML/MIN: ICD-10-CM

## 2021-06-02 DIAGNOSIS — R50.9 FEVER AND CHILLS: ICD-10-CM

## 2021-06-02 DIAGNOSIS — E83.42 HYPOMAGNESEMIA: ICD-10-CM

## 2021-06-02 DIAGNOSIS — R11.0 NAUSEA: ICD-10-CM

## 2021-06-02 DIAGNOSIS — G89.3 NEOPLASM RELATED PAIN: ICD-10-CM

## 2021-06-02 DIAGNOSIS — C09.9 SQUAMOUS CELL CARCINOMA OF TONSIL (H): ICD-10-CM

## 2021-06-02 DIAGNOSIS — G89.3 CANCER RELATED PAIN: ICD-10-CM

## 2021-06-02 DIAGNOSIS — R13.12 OROPHARYNGEAL DYSPHAGIA: Primary | ICD-10-CM

## 2021-06-02 DIAGNOSIS — K12.1 ORAL ULCER: ICD-10-CM

## 2021-06-02 DIAGNOSIS — R62.51 FAILURE TO THRIVE (0-17): Primary | ICD-10-CM

## 2021-06-02 DIAGNOSIS — Z78.9 ON ENTERAL NUTRITION: ICD-10-CM

## 2021-06-02 LAB
ALBUMIN SERPL-MCNC: 2.4 G/DL (ref 3.4–5)
ALBUMIN SERPL-MCNC: 2.6 G/DL (ref 3.4–5)
ALP SERPL-CCNC: 49 U/L (ref 40–150)
ALP SERPL-CCNC: 54 U/L (ref 40–150)
ALT SERPL W P-5'-P-CCNC: 23 U/L (ref 0–70)
ALT SERPL W P-5'-P-CCNC: 24 U/L (ref 0–70)
ANION GAP SERPL CALCULATED.3IONS-SCNC: 3 MMOL/L (ref 3–14)
ANION GAP SERPL CALCULATED.3IONS-SCNC: 7 MMOL/L (ref 3–14)
AST SERPL W P-5'-P-CCNC: 23 U/L (ref 0–45)
AST SERPL W P-5'-P-CCNC: 24 U/L (ref 0–45)
BACTERIA SPEC CULT: NO GROWTH
BACTERIA SPEC CULT: NO GROWTH
BASOPHILS # BLD AUTO: 0 10E9/L (ref 0–0.2)
BASOPHILS # BLD AUTO: 0 10E9/L (ref 0–0.2)
BASOPHILS NFR BLD AUTO: 0.4 %
BASOPHILS NFR BLD AUTO: 0.6 %
BILIRUB SERPL-MCNC: 0.5 MG/DL (ref 0.2–1.3)
BILIRUB SERPL-MCNC: 0.6 MG/DL (ref 0.2–1.3)
BUN SERPL-MCNC: 24 MG/DL (ref 7–30)
BUN SERPL-MCNC: 29 MG/DL (ref 7–30)
CALCIUM SERPL-MCNC: 8 MG/DL (ref 8.5–10.1)
CALCIUM SERPL-MCNC: 8.5 MG/DL (ref 8.5–10.1)
CHLORIDE SERPL-SCNC: 101 MMOL/L (ref 94–109)
CHLORIDE SERPL-SCNC: 103 MMOL/L (ref 94–109)
CO2 SERPL-SCNC: 30 MMOL/L (ref 20–32)
CO2 SERPL-SCNC: 31 MMOL/L (ref 20–32)
CREAT SERPL-MCNC: 1.22 MG/DL (ref 0.66–1.25)
CREAT SERPL-MCNC: 1.58 MG/DL (ref 0.66–1.25)
DIFFERENTIAL METHOD BLD: ABNORMAL
DIFFERENTIAL METHOD BLD: ABNORMAL
EOSINOPHIL # BLD AUTO: 0 10E9/L (ref 0–0.7)
EOSINOPHIL # BLD AUTO: 0 10E9/L (ref 0–0.7)
EOSINOPHIL NFR BLD AUTO: 0.8 %
EOSINOPHIL NFR BLD AUTO: 1.1 %
ERYTHROCYTE [DISTWIDTH] IN BLOOD BY AUTOMATED COUNT: 14.4 % (ref 10–15)
ERYTHROCYTE [DISTWIDTH] IN BLOOD BY AUTOMATED COUNT: 14.6 % (ref 10–15)
GFR SERPL CREATININE-BSD FRML MDRD: 44 ML/MIN/{1.73_M2}
GFR SERPL CREATININE-BSD FRML MDRD: 60 ML/MIN/{1.73_M2}
GLUCOSE SERPL-MCNC: 100 MG/DL (ref 70–99)
GLUCOSE SERPL-MCNC: 93 MG/DL (ref 70–99)
HCT VFR BLD AUTO: 29.3 % (ref 40–53)
HCT VFR BLD AUTO: 31.4 % (ref 40–53)
HGB BLD-MCNC: 10.3 G/DL (ref 13.3–17.7)
HGB BLD-MCNC: 9.6 G/DL (ref 13.3–17.7)
IMM GRANULOCYTES # BLD: 0 10E9/L (ref 0–0.4)
IMM GRANULOCYTES # BLD: 0 10E9/L (ref 0–0.4)
IMM GRANULOCYTES NFR BLD: 0 %
IMM GRANULOCYTES NFR BLD: 0 %
LABORATORY COMMENT REPORT: NORMAL
LYMPHOCYTES # BLD AUTO: 0.2 10E9/L (ref 0.8–5.3)
LYMPHOCYTES # BLD AUTO: 0.2 10E9/L (ref 0.8–5.3)
LYMPHOCYTES NFR BLD AUTO: 11.2 %
LYMPHOCYTES NFR BLD AUTO: 8.1 %
MAGNESIUM SERPL-MCNC: 1.2 MG/DL (ref 1.6–2.3)
MAGNESIUM SERPL-MCNC: 1.5 MG/DL (ref 1.6–2.3)
MCH RBC QN AUTO: 28.5 PG (ref 26.5–33)
MCH RBC QN AUTO: 28.7 PG (ref 26.5–33)
MCHC RBC AUTO-ENTMCNC: 32.8 G/DL (ref 31.5–36.5)
MCHC RBC AUTO-ENTMCNC: 32.8 G/DL (ref 31.5–36.5)
MCV RBC AUTO: 87 FL (ref 78–100)
MCV RBC AUTO: 88 FL (ref 78–100)
MONOCYTES # BLD AUTO: 0.4 10E9/L (ref 0–1.3)
MONOCYTES # BLD AUTO: 0.6 10E9/L (ref 0–1.3)
MONOCYTES NFR BLD AUTO: 20.7 %
MONOCYTES NFR BLD AUTO: 25 %
NEUTROPHILS # BLD AUTO: 1.2 10E9/L (ref 1.6–8.3)
NEUTROPHILS # BLD AUTO: 1.6 10E9/L (ref 1.6–8.3)
NEUTROPHILS NFR BLD AUTO: 65.7 %
NEUTROPHILS NFR BLD AUTO: 66.4 %
NRBC # BLD AUTO: 0 10*3/UL
NRBC # BLD AUTO: 0 10*3/UL
NRBC BLD AUTO-RTO: 0 /100
NRBC BLD AUTO-RTO: 0 /100
PLATELET # BLD AUTO: 102 10E9/L (ref 150–450)
PLATELET # BLD AUTO: 88 10E9/L (ref 150–450)
PLATELET # BLD EST: ABNORMAL 10*3/UL
PLATELET # BLD EST: ABNORMAL 10*3/UL
POTASSIUM SERPL-SCNC: 3.7 MMOL/L (ref 3.4–5.3)
POTASSIUM SERPL-SCNC: 4.1 MMOL/L (ref 3.4–5.3)
PROT SERPL-MCNC: 5.8 G/DL (ref 6.8–8.8)
PROT SERPL-MCNC: 6.3 G/DL (ref 6.8–8.8)
RBC # BLD AUTO: 3.35 10E12/L (ref 4.4–5.9)
RBC # BLD AUTO: 3.62 10E12/L (ref 4.4–5.9)
RBC MORPH BLD: ABNORMAL
SARS-COV-2 RNA RESP QL NAA+PROBE: NEGATIVE
SARS-COV-2 RNA RESP QL NAA+PROBE: NORMAL
SODIUM SERPL-SCNC: 137 MMOL/L (ref 133–144)
SODIUM SERPL-SCNC: 139 MMOL/L (ref 133–144)
SPECIMEN SOURCE: NORMAL
WBC # BLD AUTO: 1.8 10E9/L (ref 4–11)
WBC # BLD AUTO: 2.5 10E9/L (ref 4–11)

## 2021-06-02 PROCEDURE — 258N000003 HC RX IP 258 OP 636: Performed by: NURSE PRACTITIONER

## 2021-06-02 PROCEDURE — 999N000128 HC STATISTIC PERIPHERAL IV START W/O US GUIDANCE

## 2021-06-02 PROCEDURE — 96365 THER/PROPH/DIAG IV INF INIT: CPT

## 2021-06-02 PROCEDURE — G0463 HOSPITAL OUTPT CLINIC VISIT: HCPCS | Mod: 25,27

## 2021-06-02 PROCEDURE — 96366 THER/PROPH/DIAG IV INF ADDON: CPT

## 2021-06-02 PROCEDURE — 96361 HYDRATE IV INFUSION ADD-ON: CPT

## 2021-06-02 PROCEDURE — 258N000003 HC RX IP 258 OP 636: Performed by: STUDENT IN AN ORGANIZED HEALTH CARE EDUCATION/TRAINING PROGRAM

## 2021-06-02 PROCEDURE — 83735 ASSAY OF MAGNESIUM: CPT | Performed by: STUDENT IN AN ORGANIZED HEALTH CARE EDUCATION/TRAINING PROGRAM

## 2021-06-02 PROCEDURE — 80053 COMPREHEN METABOLIC PANEL: CPT | Performed by: NURSE PRACTITIONER

## 2021-06-02 PROCEDURE — 83735 ASSAY OF MAGNESIUM: CPT | Performed by: NURSE PRACTITIONER

## 2021-06-02 PROCEDURE — 120N000002 HC R&B MED SURG/OB UMMC

## 2021-06-02 PROCEDURE — 87635 SARS-COV-2 COVID-19 AMP PRB: CPT | Performed by: NURSE PRACTITIONER

## 2021-06-02 PROCEDURE — 84132 ASSAY OF SERUM POTASSIUM: CPT | Performed by: STUDENT IN AN ORGANIZED HEALTH CARE EDUCATION/TRAINING PROGRAM

## 2021-06-02 PROCEDURE — 250N000011 HC RX IP 250 OP 636: Performed by: NURSE PRACTITIONER

## 2021-06-02 PROCEDURE — 85025 COMPLETE CBC W/AUTO DIFF WBC: CPT | Performed by: STUDENT IN AN ORGANIZED HEALTH CARE EDUCATION/TRAINING PROGRAM

## 2021-06-02 PROCEDURE — 99222 1ST HOSP IP/OBS MODERATE 55: CPT | Mod: AI | Performed by: STUDENT IN AN ORGANIZED HEALTH CARE EDUCATION/TRAINING PROGRAM

## 2021-06-02 PROCEDURE — 92526 ORAL FUNCTION THERAPY: CPT | Mod: GN | Performed by: SPEECH-LANGUAGE PATHOLOGIST

## 2021-06-02 PROCEDURE — 36415 COLL VENOUS BLD VENIPUNCTURE: CPT | Performed by: STUDENT IN AN ORGANIZED HEALTH CARE EDUCATION/TRAINING PROGRAM

## 2021-06-02 PROCEDURE — 99215 OFFICE O/P EST HI 40 MIN: CPT | Performed by: NURSE PRACTITIONER

## 2021-06-02 PROCEDURE — 80053 COMPREHEN METABOLIC PANEL: CPT | Performed by: STUDENT IN AN ORGANIZED HEALTH CARE EDUCATION/TRAINING PROGRAM

## 2021-06-02 PROCEDURE — 85025 COMPLETE CBC W/AUTO DIFF WBC: CPT | Performed by: NURSE PRACTITIONER

## 2021-06-02 PROCEDURE — 84100 ASSAY OF PHOSPHORUS: CPT | Performed by: STUDENT IN AN ORGANIZED HEALTH CARE EDUCATION/TRAINING PROGRAM

## 2021-06-02 PROCEDURE — 250N000013 HC RX MED GY IP 250 OP 250 PS 637: Performed by: STUDENT IN AN ORGANIZED HEALTH CARE EDUCATION/TRAINING PROGRAM

## 2021-06-02 RX ORDER — ALBUTEROL SULFATE 90 UG/1
1-2 AEROSOL, METERED RESPIRATORY (INHALATION)
Status: CANCELLED
Start: 2021-06-02

## 2021-06-02 RX ORDER — ACETAMINOPHEN 325 MG/1
650 TABLET ORAL EVERY 4 HOURS PRN
Status: DISCONTINUED | OUTPATIENT
Start: 2021-06-02 | End: 2021-06-07

## 2021-06-02 RX ORDER — METHYLPREDNISOLONE SODIUM SUCCINATE 125 MG/2ML
125 INJECTION, POWDER, LYOPHILIZED, FOR SOLUTION INTRAMUSCULAR; INTRAVENOUS
Status: CANCELLED
Start: 2021-06-02

## 2021-06-02 RX ORDER — AMOXICILLIN 250 MG
2 CAPSULE ORAL 2 TIMES DAILY PRN
Status: DISCONTINUED | OUTPATIENT
Start: 2021-06-02 | End: 2021-06-09 | Stop reason: HOSPADM

## 2021-06-02 RX ORDER — HEPARIN SODIUM,PORCINE 10 UNIT/ML
5 VIAL (ML) INTRAVENOUS
Status: CANCELLED | OUTPATIENT
Start: 2021-06-02

## 2021-06-02 RX ORDER — DIPHENHYDRAMINE HYDROCHLORIDE 50 MG/ML
50 INJECTION INTRAMUSCULAR; INTRAVENOUS
Status: CANCELLED
Start: 2021-06-02

## 2021-06-02 RX ORDER — LIDOCAINE 40 MG/G
CREAM TOPICAL
Status: DISCONTINUED | OUTPATIENT
Start: 2021-06-02 | End: 2021-06-09 | Stop reason: HOSPADM

## 2021-06-02 RX ORDER — ONDANSETRON 2 MG/ML
4 INJECTION INTRAMUSCULAR; INTRAVENOUS EVERY 6 HOURS PRN
Status: DISCONTINUED | OUTPATIENT
Start: 2021-06-02 | End: 2021-06-09 | Stop reason: HOSPADM

## 2021-06-02 RX ORDER — MEPERIDINE HYDROCHLORIDE 25 MG/ML
25 INJECTION INTRAMUSCULAR; INTRAVENOUS; SUBCUTANEOUS EVERY 30 MIN PRN
Status: CANCELLED | OUTPATIENT
Start: 2021-06-02

## 2021-06-02 RX ORDER — AMOXICILLIN 250 MG
1 CAPSULE ORAL 2 TIMES DAILY PRN
Status: DISCONTINUED | OUTPATIENT
Start: 2021-06-02 | End: 2021-06-09 | Stop reason: HOSPADM

## 2021-06-02 RX ORDER — ALBUTEROL SULFATE 5 MG/ML
2.5 SOLUTION RESPIRATORY (INHALATION)
Status: CANCELLED | OUTPATIENT
Start: 2021-06-02

## 2021-06-02 RX ORDER — EPINEPHRINE 1 MG/ML
0.3 INJECTION, SOLUTION INTRAMUSCULAR; SUBCUTANEOUS EVERY 5 MIN PRN
Status: CANCELLED | OUTPATIENT
Start: 2021-06-02

## 2021-06-02 RX ORDER — MINERAL OIL/HYDROPHIL PETROLAT
OINTMENT (GRAM) TOPICAL
Status: DISCONTINUED | OUTPATIENT
Start: 2021-06-02 | End: 2021-06-09 | Stop reason: HOSPADM

## 2021-06-02 RX ORDER — NALOXONE HYDROCHLORIDE 0.4 MG/ML
0.2 INJECTION, SOLUTION INTRAMUSCULAR; INTRAVENOUS; SUBCUTANEOUS
Status: CANCELLED | OUTPATIENT
Start: 2021-06-02

## 2021-06-02 RX ORDER — HEPARIN SODIUM (PORCINE) LOCK FLUSH IV SOLN 100 UNIT/ML 100 UNIT/ML
5 SOLUTION INTRAVENOUS
Status: CANCELLED | OUTPATIENT
Start: 2021-06-02

## 2021-06-02 RX ORDER — HYDROCODONE BITARTRATE AND ACETAMINOPHEN 10; 325 MG/1; MG/1
1-2 TABLET ORAL EVERY 4 HOURS PRN
Status: DISCONTINUED | OUTPATIENT
Start: 2021-06-02 | End: 2021-06-05

## 2021-06-02 RX ORDER — METHADONE HYDROCHLORIDE 10 MG/1
10 TABLET ORAL 3 TIMES DAILY
Status: DISCONTINUED | OUTPATIENT
Start: 2021-06-02 | End: 2021-06-04

## 2021-06-02 RX ORDER — LANOLIN ALCOHOL/MO/W.PET/CERES
3 CREAM (GRAM) TOPICAL
Status: DISCONTINUED | OUTPATIENT
Start: 2021-06-02 | End: 2021-06-09 | Stop reason: HOSPADM

## 2021-06-02 RX ORDER — ROSUVASTATIN CALCIUM 20 MG/1
40 TABLET, COATED ORAL DAILY
Status: DISCONTINUED | OUTPATIENT
Start: 2021-06-03 | End: 2021-06-07

## 2021-06-02 RX ORDER — ONDANSETRON 4 MG/1
4 TABLET, ORALLY DISINTEGRATING ORAL EVERY 6 HOURS PRN
Status: DISCONTINUED | OUTPATIENT
Start: 2021-06-02 | End: 2021-06-09 | Stop reason: HOSPADM

## 2021-06-02 RX ORDER — CITALOPRAM HYDROBROMIDE 20 MG/1
40 TABLET ORAL DAILY
Status: DISCONTINUED | OUTPATIENT
Start: 2021-06-03 | End: 2021-06-07 | Stop reason: ALTCHOICE

## 2021-06-02 RX ORDER — DIPHENHYDRAMINE HYDROCHLORIDE AND LIDOCAINE HYDROCHLORIDE AND ALUMINUM HYDROXIDE AND MAGNESIUM HYDRO
10 KIT EVERY 4 HOURS PRN
Status: DISCONTINUED | OUTPATIENT
Start: 2021-06-02 | End: 2021-06-09 | Stop reason: HOSPADM

## 2021-06-02 RX ORDER — MAGNESIUM SULFATE HEPTAHYDRATE 40 MG/ML
4 INJECTION, SOLUTION INTRAVENOUS ONCE
Status: COMPLETED | OUTPATIENT
Start: 2021-06-02 | End: 2021-06-03

## 2021-06-02 RX ORDER — METOPROLOL SUCCINATE 50 MG/1
50 TABLET, EXTENDED RELEASE ORAL DAILY
Status: DISCONTINUED | OUTPATIENT
Start: 2021-06-03 | End: 2021-06-07 | Stop reason: ALTCHOICE

## 2021-06-02 RX ORDER — ALBUTEROL SULFATE 0.83 MG/ML
2.5 SOLUTION RESPIRATORY (INHALATION)
Status: CANCELLED | OUTPATIENT
Start: 2021-06-02

## 2021-06-02 RX ORDER — SODIUM CHLORIDE, SODIUM LACTATE, POTASSIUM CHLORIDE, CALCIUM CHLORIDE 600; 310; 30; 20 MG/100ML; MG/100ML; MG/100ML; MG/100ML
INJECTION, SOLUTION INTRAVENOUS CONTINUOUS
Status: DISCONTINUED | OUTPATIENT
Start: 2021-06-02 | End: 2021-06-04

## 2021-06-02 RX ORDER — SILVER SULFADIAZINE 10 MG/G
CREAM TOPICAL DAILY
Status: DISCONTINUED | OUTPATIENT
Start: 2021-06-03 | End: 2021-06-09 | Stop reason: HOSPADM

## 2021-06-02 RX ORDER — HYDROXYZINE HYDROCHLORIDE 25 MG/1
50 TABLET, FILM COATED ORAL EVERY 6 HOURS PRN
Status: DISCONTINUED | OUTPATIENT
Start: 2021-06-02 | End: 2021-06-07

## 2021-06-02 RX ORDER — ALPRAZOLAM 0.25 MG
0.5 TABLET ORAL 4 TIMES DAILY PRN
Status: DISCONTINUED | OUTPATIENT
Start: 2021-06-02 | End: 2021-06-07

## 2021-06-02 RX ORDER — POLYETHYLENE GLYCOL 3350 17 G/17G
17 POWDER, FOR SOLUTION ORAL DAILY PRN
Status: DISCONTINUED | OUTPATIENT
Start: 2021-06-02 | End: 2021-06-09 | Stop reason: HOSPADM

## 2021-06-02 RX ORDER — HYDROXYZINE PAMOATE 25 MG/1
25 CAPSULE ORAL 4 TIMES DAILY PRN
Status: DISCONTINUED | OUTPATIENT
Start: 2021-06-02 | End: 2021-06-02

## 2021-06-02 RX ADMIN — MAGNESIUM SULFATE HEPTAHYDRATE: 500 INJECTION, SOLUTION INTRAMUSCULAR; INTRAVENOUS at 10:50

## 2021-06-02 RX ADMIN — Medication 1000 ML: at 09:29

## 2021-06-02 RX ADMIN — SODIUM CHLORIDE, POTASSIUM CHLORIDE, SODIUM LACTATE AND CALCIUM CHLORIDE: 600; 310; 30; 20 INJECTION, SOLUTION INTRAVENOUS at 22:22

## 2021-06-02 RX ADMIN — METHADONE HYDROCHLORIDE 10 MG: 10 TABLET ORAL at 23:45

## 2021-06-02 ASSESSMENT — PAIN SCALES - GENERAL: PAINLEVEL: EXTREME PAIN (8)

## 2021-06-02 NOTE — LETTER
6/2/2021         RE: Quan Murphy  [address] [facility] [address]        Dear Colleague,    Thank you for referring your patient, Quan Murphy, to the [facility]. Please see a copy of my visit note below.    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 Christensen). Path: SCC, non keratinizing, p16 +gardenia  3/23/21 CT neck. 2.9 x 2.7 x 3.5 cm L tonsil fullness involving soft palate, level 2-3 neck nodes  4/2/21 PET/CT. 2.7 x 2.2 x 2.7 cm L palatine tonsil mass (SUV 24), extension to oral cavity, 3.4 x 2.0 cm L level 4 node invading SCM, 1.8 x 1.8 cm L level 2A/3 node (SUV 7.9) w/ECS, 1.4 x 0.9 cm R level 2A node (SUV 6.7)    Concurrent chemoradiation with weekly cisplatin began 4/28/21    SUBJECTIVE  New is doing poorly today and feels defeated as he feels he is trying hard to follow recommendations. He is having pain and thick secretions that are limiting his PO intake. The secretions make him feel sick during radiation treatment. He is drinking water and drinks 1/4 of a 1300 calorie shake each day. No other new sx. Not sure when PEG is scheduled for.     10 point review of systems otherwise negative    CURRENT OUTPATIENT MEDICATIONS  Current Outpatient Medications   Medication Sig Dispense Refill     ALPRAZolam (XANAX) 0.5 MG tablet TAKE 1 TABLET (0.5 MG) BY MOUTH 4 TIMES DAILY AS NEEDED FOR ANXIETY 120 tablet 0     ASPIRIN ADULT LOW STRENGTH 81 MG EC tablet TAKE ONE TABLET BY MOUTH ONCE DAILY 90 tablet 1     citalopram  (CELEXA) 40 MG tablet TAKE ONE TABLET BY MOUTH ONCE DAILY 30 tablet 8     doxepin (SINEQUAN) 10 MG/ML (HIGH CONC) solution Mouth pain: take 1.5 mL mixed with equal amount tap water. Swish for 60 sec then spit. Take every 4 h as needed. Sleep: take 0.6 mL at night. May repeat this once more overnight. Do not take more than 2 xanax overnight. 118 mL 0     guaiFENesin (MUCINEX) 600 MG 12 hr tablet Take 2 tablets (1,200 mg) by mouth 2 times daily 60 tablet 1     HYDROcodone-acetaminophen (NORCO)  MG per tablet Take 1-2 tablets by mouth every 4 hours as needed for severe pain (No more than 10 a day. Stop dilaudid.) 150 tablet 0     hydrOXYzine (VISTARIL) 25 MG capsule TAKE 1 CAPSULE (25 MG) BY MOUTH 4 TIMES DAILY AS NEEDED FOR ANXIETY 120 capsule 3     magic mouthwash (ENTER INGREDIENTS IN COMMENTS) suspension Take 10 mLs by mouth every 4 hours as needed (pain) Swish and spit 200 mL 1     methadone (DOLOPHINE) 5 MG tablet Take 1 tablet (5 mg) by mouth 4 times daily 120 tablet 0     metoprolol succinate ER (TOPROL-XL) 50 MG 24 hr tablet Take 1 tablet (50 mg) by mouth daily 90 tablet 3     naloxone (NARCAN) 4 MG/0.1ML nasal spray Spray 1 spray (4 mg) into one nostril alternating nostrils as needed for opioid reversal every 2-3 minutes until assistance arrives 0.2 mL      nitroGLYcerin (NITROSTAT) 0.4 MG sublingual tablet For chest pain place 1 tablet under the tongue every 5 minutes for 3 doses. If symptoms persist 5 minutes after 1st dose call 911. 25 tablet 0     omeprazole (PRILOSEC) 40 MG DR capsule Take 1 capsule (40 mg) by mouth daily 90 capsule 3     ondansetron (ZOFRAN-ODT) 8 MG ODT tab Take 1 tablet (8 mg) by mouth every 8 hours as needed for nausea 30 tablet 11     prochlorperazine (COMPAZINE) 10 MG tablet Take 0.5 tablets (5 mg) by mouth every 6 hours as needed (Nausea/Vomiting) 30 tablet 11     rosuvastatin (CRESTOR) 40 MG tablet Take 1 tablet (40 mg) by mouth daily 90 tablet 1     silver sulfADIAZINE  (SILVADENE) 1 % external cream Apply topically daily 400 g 0     testosterone (ANDROGEL 1.62 % PUMP) 20.25 MG/ACT gel testosterone 20.25 mg/1.25 gram (1.62 %) transdermal gel pump       zolpidem (AMBIEN) 10 MG tablet TAKE ONE TABLET BY MOUTH EVERY EVENING AS NEEDED FOR SLEEP 30 tablet 5     PHYSICAL EXAM  /83 (BP Location: Right arm, Patient Position: Sitting, Cuff Size: Adult Regular)   Pulse 82   Temp 99  F (37.2  C) (Oral)   Resp 16   Wt 94.7 kg (208 lb 11.2 oz)   SpO2 94%   BMI 31.27 kg/m    Wt Readings from Last 10 Encounters:   05/27/21 97.3 kg (214 lb 6.4 oz)   05/26/21 96.3 kg (212 lb 3.2 oz)   05/20/21 100.7 kg (222 lb)   05/19/21 99.9 kg (220 lb 4.8 oz)   05/14/21 103.5 kg (228 lb 3.2 oz)   05/13/21 103 kg (227 lb)   05/12/21 101.3 kg (223 lb 6.4 oz)   05/06/21 103.4 kg (228 lb)   05/05/21 102.2 kg (225 lb 4.8 oz)   04/29/21 105.7 kg (233 lb)   General: The patient is a pleasant male; fatigued and more flat affect that previously   HEENT: EOMI, PERRL. Sclerae are anicteric. Oral mucosa is pink, diffuse mucositis.   Lymph: nodes less palpable than prior  Heart: Regular rate and rhythm.   Lungs: Clear to auscultation bilaterally.   Abdomen: Bowel sounds present, soft, nontender  Extremities: No lower extremity edema noted bilaterally.   Neuro: Cranial nerves II through XII are grossly intact.  Skin: dermatitis to neck    LABORATORY AND IMAGING STUDIES     Ref. Range 6/2/2021 08:06   Sodium Latest Ref Range: 133 - 144 mmol/L 139   Potassium Latest Ref Range: 3.4 - 5.3 mmol/L 4.1   Chloride Latest Ref Range: 94 - 109 mmol/L 101   Carbon Dioxide Latest Ref Range: 20 - 32 mmol/L 30   Urea Nitrogen Latest Ref Range: 7 - 30 mg/dL 29   Creatinine Latest Ref Range: 0.66 - 1.25 mg/dL 1.58 (H)   GFR Estimate Latest Ref Range: >60 mL/min/1.73_m2 44 (L)   GFR Estimate If Black Latest Ref Range: >60 mL/min/1.73_m2 51 (L)   Calcium Latest Ref Range: 8.5 - 10.1 mg/dL 8.5   Anion Gap Latest Ref Range: 3 - 14  mmol/L 7   Magnesium Latest Ref Range: 1.6 - 2.3 mg/dL 1.2 (L)   Albumin Latest Ref Range: 3.4 - 5.0 g/dL 2.6 (L)   Protein Total Latest Ref Range: 6.8 - 8.8 g/dL 6.3 (L)   Bilirubin Total Latest Ref Range: 0.2 - 1.3 mg/dL 0.6   Alkaline Phosphatase Latest Ref Range: 40 - 150 U/L 54   ALT Latest Ref Range: 0 - 70 U/L 23   AST Latest Ref Range: 0 - 45 U/L 23   Glucose Latest Ref Range: 70 - 99 mg/dL 100 (H)   WBC Latest Ref Range: 4.0 - 11.0 10e9/L 2.5 (L)   Hemoglobin Latest Ref Range: 13.3 - 17.7 g/dL 10.3 (L)   Hematocrit Latest Ref Range: 40.0 - 53.0 % 31.4 (L)   Platelet Count Latest Ref Range: 150 - 450 10e9/L 102 (L)   RBC Count Latest Ref Range: 4.4 - 5.9 10e12/L 3.62 (L)   MCV Latest Ref Range: 78 - 100 fl 87   MCH Latest Ref Range: 26.5 - 33.0 pg 28.5   MCHC Latest Ref Range: 31.5 - 36.5 g/dL 32.8   RDW Latest Ref Range: 10.0 - 15.0 % 14.4   Diff Method Unknown Automated Method   % Neutrophils Latest Units: % 65.7   % Lymphocytes Latest Units: % 8.1   % Monocytes Latest Units: % 25.0   % Eosinophils Latest Units: % 0.8   % Basophils Latest Units: % 0.4   % Immature Granulocytes Latest Units: % 0.0   Nucleated RBCs Latest Ref Range: 0 /100 0   Absolute Neutrophil Latest Ref Range: 1.6 - 8.3 10e9/L 1.6   Absolute Lymphocytes Latest Ref Range: 0.8 - 5.3 10e9/L 0.2 (L)   Absolute Monocytes Latest Ref Range: 0.0 - 1.3 10e9/L 0.6   Absolute Eosinophils Latest Ref Range: 0.0 - 0.7 10e9/L 0.0   Absolute Basophils Latest Ref Range: 0.0 - 0.2 10e9/L 0.0   Abs Immature Granulocytes Latest Ref Range: 0 - 0.4 10e9/L 0.0   Absolute Nucleated RBC Unknown 0.0     ASSESSMENT AND PLAN  SCC L tonsil, bE6D2Q1, p16 +gardenia, ECS +gardenia: Currently in week 6 of chemoradiation. Having the expected toxicities, now with very limited PO intake and continued weight loss. New is having general FTT. Plan to hold cisplatin today (could make up next week) and admit to hospital for urgent PEG placement, tube feeding initiation, and IVF.  D/w  Dr. Schrader. Unfortunately no beds available for direct admission so will send him to ED as I do not feel outpatient management is safe at this point. He has received 2L IVF and IV mag. I gave verbal hand off to ED provider Dr. Oshea.     CKD: Creatinine high today, likely decreased PO intake and chemo. Holding cisplatin and giving IVF in clinic. Monitor for improvement and stability inpatient    Malnutrition, weight loss: ongoing, down another 7 lbs since last week. Barely getting any nutrition PO. we were working on getting PEG through thoracic surgery but at this point we are not sure it can happen this week and waiting until next week is not an option, hence admission for nutrition stability      Mucositis: s/s rinses and pain control as below.  HSV negative     Fever, chills: Intermittent temps to the 99 range mostly a few days after cisplatin. No other symptoms to suggest infection. HSV swab negative as above. Blood cultures and UA/UC negative from last week     Secretions: He's having a hard time while getting radiation. Could try guaifenessin once has tube.      Constipation: senna, colace, andmiralax, and take milk of magnesia prn.     Anticipated acute on chronic opioid use: Working with Dr. Serrano on this; methadone now at 10 mg TID. And 10 norco tabs max daily.     Anxiety, depression: Affect flat today. Has support of wife. On celexa and Xanax. Monitor. Would look to Dr. Serrano for futher recs      ASCVD:  See note by Dr. Ch, Cardiology, from 10/20/20. BP ok today. Per previous notes add metoprolol if his BP is consistently >140/90. CrCl okay to continue lisinopril.      60 minutes spent on the date of the encounter doing chart review, review of test results, interpretation of tests, patient visit, documentation, discussion with other provider(s) and discussion with family     Leisa Zarate CNP on 6/2/2021 at 12:21 PM            Again, thank you for allowing me to participate in the care of  your patient.        Sincerely,        Leisa Zarate, CNP

## 2021-06-02 NOTE — PROGRESS NOTES
Infusion Nursing Note:  Quan Murphy presents today for Cycle 1 Day 36 Cisplatin - DEFERRED - Fluids and Electrolyte replacement.    Patient seen by provider today: Yes: Leisa Zarate NP   present during visit today: Not Applicable.    Note:     Per ROSY Zarate NP/Beatrice Hicks, RN @0907 6/2/21:  - Deferring Cisplatin one week; pt getting directly admitted for PEG tube placement  - Pt has not been eating/drinking x 2 weeks (10lb weight loss per pt)  - Give 2L NS and replace magnesium IV per protocol    Patient aware of plan. Speech therapy visited pt during infusion.     Per ROSY Zarate NP/eBatrice Hicks RN 6/2/21:  - Could be a long wait for DA; send pt through the ED  - OK for wife to bring patient over  - No radiation today  - OK to leave PIV in for admission    Patient updated with plan and verbalized understanding.    Intravenous Access:  Peripheral IV placed.    Treatment Conditions:  Lab Results   Component Value Date    HGB 10.3 06/02/2021     Lab Results   Component Value Date    WBC 2.5 06/02/2021      Lab Results   Component Value Date    ANEU 1.6 06/02/2021     Lab Results   Component Value Date     06/02/2021      Lab Results   Component Value Date     06/02/2021                   Lab Results   Component Value Date    POTASSIUM 4.1 06/02/2021           Lab Results   Component Value Date    MAG 1.2 06/02/2021            Lab Results   Component Value Date    CR 1.58 06/02/2021                   Lab Results   Component Value Date    LATRELL 8.5 06/02/2021                Lab Results   Component Value Date    BILITOTAL 0.6 06/02/2021           Lab Results   Component Value Date    ALBUMIN 2.6 06/02/2021                    Lab Results   Component Value Date    ALT 23 06/02/2021           Lab Results   Component Value Date    AST 23 06/02/2021       Results reviewed, labs MET treatment parameters, ok to proceed with treatment. See ROSY above.      Post Infusion  Assessment:  Patient tolerated infusion without incident.  Blood return noted pre and post infusion.  Site patent and intact, free from redness, edema or discomfort.  No evidence of extravasations.  PIV saline locked and wrapped for ED admission.    Discharge Plan:   Patient declined prescription refills.  Discharge instructions reviewed with: Patient.  Patient and/or family verbalized understanding of discharge instructions and all questions answered.  AVS to patient via MYCHART.  Patient will return 6/8 for IV fluids.   Patient discharged in stable condition accompanied by: self.  Departure Mode: Ambulatory.  Face to Face time: 5.      Beatrice Hicks RN

## 2021-06-02 NOTE — NURSING NOTE
"Oncology Rooming Note    June 2, 2021 8:16 AM   Quan Murphy is a 70 year old male who presents for:    Chief Complaint   Patient presents with     Blood Draw     labs drawn with IV start by rn in lab.  vital signs taken.     Oncology Clinic Visit     TONSIL CANCER     Initial Vitals: /83 (BP Location: Right arm, Patient Position: Sitting, Cuff Size: Adult Regular)   Pulse 82   Temp 99  F (37.2  C) (Oral)   Resp 16   Wt 94.7 kg (208 lb 11.2 oz)   SpO2 94%   BMI 31.27 kg/m   Estimated body mass index is 31.27 kg/m  as calculated from the following:    Height as of 5/26/21: 1.74 m (5' 8.5\").    Weight as of this encounter: 94.7 kg (208 lb 11.2 oz). Body surface area is 2.14 meters squared.  Extreme Pain (8) Comment: Data Unavailable   No LMP for male patient.  Allergies reviewed: Yes  Medications reviewed: Yes    Medications: Medication refills not needed today.  Pharmacy name entered into [employer]:    [facility] [place], [place] - [address] [place] [address] [address] - [place], [place] - [address]  [facility] [place], [place] - [address] [place] [address] [phone]  [facility] - [place], [place] - [address]    Clinical concerns: Buildup of phlegm, makes it hard to get through radiation.        Mary Jane Michelle, [facility]              "

## 2021-06-03 ENCOUNTER — APPOINTMENT (OUTPATIENT)
Dept: RADIATION ONCOLOGY | Facility: CLINIC | Age: 71
End: 2021-06-03
Attending: RADIOLOGY
Payer: MEDICARE

## 2021-06-03 VITALS
WEIGHT: 211 LBS | SYSTOLIC BLOOD PRESSURE: 142 MMHG | DIASTOLIC BLOOD PRESSURE: 70 MMHG | HEART RATE: 68 BPM | BODY MASS INDEX: 31.62 KG/M2

## 2021-06-03 DIAGNOSIS — C61 MALIGNANT NEOPLASM OF PROSTATE (H): Primary | ICD-10-CM

## 2021-06-03 LAB — PHOSPHATE SERPL-MCNC: 2.5 MG/DL (ref 2.5–4.5)

## 2021-06-03 PROCEDURE — 258N000003 HC RX IP 258 OP 636: Performed by: STUDENT IN AN ORGANIZED HEALTH CARE EDUCATION/TRAINING PROGRAM

## 2021-06-03 PROCEDURE — 250N000013 HC RX MED GY IP 250 OP 250 PS 637: Performed by: STUDENT IN AN ORGANIZED HEALTH CARE EDUCATION/TRAINING PROGRAM

## 2021-06-03 PROCEDURE — 250N000011 HC RX IP 250 OP 636: Performed by: STUDENT IN AN ORGANIZED HEALTH CARE EDUCATION/TRAINING PROGRAM

## 2021-06-03 PROCEDURE — 99221 1ST HOSP IP/OBS SF/LOW 40: CPT | Mod: GC | Performed by: STUDENT IN AN ORGANIZED HEALTH CARE EDUCATION/TRAINING PROGRAM

## 2021-06-03 PROCEDURE — 77336 RADIATION PHYSICS CONSULT: CPT | Performed by: RADIOLOGY

## 2021-06-03 PROCEDURE — 99222 1ST HOSP IP/OBS MODERATE 55: CPT | Mod: GC | Performed by: STUDENT IN AN ORGANIZED HEALTH CARE EDUCATION/TRAINING PROGRAM

## 2021-06-03 PROCEDURE — 77386 HC IMRT TREATMENT DELIVERY, COMPLEX: CPT | Performed by: RADIOLOGY

## 2021-06-03 PROCEDURE — 77427 RADIATION TX MANAGEMENT X5: CPT | Performed by: RADIOLOGY

## 2021-06-03 PROCEDURE — 250N000009 HC RX 250: Performed by: STUDENT IN AN ORGANIZED HEALTH CARE EDUCATION/TRAINING PROGRAM

## 2021-06-03 PROCEDURE — 99233 SBSQ HOSP IP/OBS HIGH 50: CPT | Performed by: STUDENT IN AN ORGANIZED HEALTH CARE EDUCATION/TRAINING PROGRAM

## 2021-06-03 PROCEDURE — G0463 HOSPITAL OUTPT CLINIC VISIT: HCPCS

## 2021-06-03 PROCEDURE — 120N000002 HC R&B MED SURG/OB UMMC

## 2021-06-03 RX ORDER — HYDROMORPHONE HYDROCHLORIDE 1 MG/ML
0.5 INJECTION, SOLUTION INTRAMUSCULAR; INTRAVENOUS; SUBCUTANEOUS
Status: DISCONTINUED | OUTPATIENT
Start: 2021-06-03 | End: 2021-06-04

## 2021-06-03 RX ORDER — MAGNESIUM OXIDE 400 MG/1
400 TABLET ORAL 3 TIMES DAILY
Status: COMPLETED | OUTPATIENT
Start: 2021-06-03 | End: 2021-06-04

## 2021-06-03 RX ADMIN — MAGNESIUM OXIDE TAB 400 MG (241.3 MG ELEMENTAL MG) 400 MG: 400 (241.3 MG) TAB at 13:32

## 2021-06-03 RX ADMIN — Medication 3 MG: at 03:23

## 2021-06-03 RX ADMIN — CITALOPRAM HYDROBROMIDE 40 MG: 20 TABLET ORAL at 07:26

## 2021-06-03 RX ADMIN — ROSUVASTATIN CALCIUM 40 MG: 20 TABLET, FILM COATED ORAL at 07:26

## 2021-06-03 RX ADMIN — METOPROLOL SUCCINATE 50 MG: 50 TABLET, EXTENDED RELEASE ORAL at 07:26

## 2021-06-03 RX ADMIN — METHADONE HYDROCHLORIDE 10 MG: 10 TABLET ORAL at 15:22

## 2021-06-03 RX ADMIN — HYDROCODONE BITARTRATE AND ACETAMINOPHEN 2 TABLET: 10; 325 TABLET ORAL at 13:32

## 2021-06-03 RX ADMIN — SODIUM CHLORIDE, POTASSIUM CHLORIDE, SODIUM LACTATE AND CALCIUM CHLORIDE: 600; 310; 30; 20 INJECTION, SOLUTION INTRAVENOUS at 07:25

## 2021-06-03 RX ADMIN — METHADONE HYDROCHLORIDE 10 MG: 10 TABLET ORAL at 19:47

## 2021-06-03 RX ADMIN — HYDROCODONE BITARTRATE AND ACETAMINOPHEN 2 TABLET: 10; 325 TABLET ORAL at 07:26

## 2021-06-03 RX ADMIN — OMEPRAZOLE 40 MG: 20 CAPSULE, DELAYED RELEASE ORAL at 07:26

## 2021-06-03 RX ADMIN — SODIUM CHLORIDE, POTASSIUM CHLORIDE, SODIUM LACTATE AND CALCIUM CHLORIDE: 600; 310; 30; 20 INJECTION, SOLUTION INTRAVENOUS at 17:53

## 2021-06-03 RX ADMIN — METHADONE HYDROCHLORIDE 10 MG: 10 TABLET ORAL at 09:38

## 2021-06-03 RX ADMIN — SILVER SULFADIAZINE: 10 CREAM TOPICAL at 09:38

## 2021-06-03 RX ADMIN — HYDROCODONE BITARTRATE AND ACETAMINOPHEN 2 TABLET: 10; 325 TABLET ORAL at 17:52

## 2021-06-03 RX ADMIN — MAGNESIUM SULFATE IN WATER 4 G: 40 INJECTION, SOLUTION INTRAVENOUS at 01:02

## 2021-06-03 RX ADMIN — HYDROMORPHONE HYDROCHLORIDE 0.5 MG: 1 INJECTION, SOLUTION INTRAMUSCULAR; INTRAVENOUS; SUBCUTANEOUS at 22:20

## 2021-06-03 RX ADMIN — MAGNESIUM OXIDE TAB 400 MG (241.3 MG ELEMENTAL MG) 400 MG: 400 (241.3 MG) TAB at 09:39

## 2021-06-03 RX ADMIN — HYDROCODONE BITARTRATE AND ACETAMINOPHEN 2 TABLET: 10; 325 TABLET ORAL at 03:19

## 2021-06-03 RX ADMIN — MAGNESIUM OXIDE TAB 400 MG (241.3 MG ELEMENTAL MG) 400 MG: 400 (241.3 MG) TAB at 19:47

## 2021-06-03 SDOH — ECONOMIC STABILITY: TRANSPORTATION INSECURITY
IN THE PAST 12 MONTHS, HAS LACK OF TRANSPORTATION KEPT YOU FROM MEETINGS, WORK, OR FROM GETTING THINGS NEEDED FOR DAILY LIVING?: NOT ASKED

## 2021-06-03 SDOH — ECONOMIC STABILITY: FOOD INSECURITY: WITHIN THE PAST 12 MONTHS, THE FOOD YOU BOUGHT JUST DIDN'T LAST AND YOU DIDN'T HAVE MONEY TO GET MORE.: NOT ASKED

## 2021-06-03 SDOH — ECONOMIC STABILITY: TRANSPORTATION INSECURITY
IN THE PAST 12 MONTHS, HAS THE LACK OF TRANSPORTATION KEPT YOU FROM MEDICAL APPOINTMENTS OR FROM GETTING MEDICATIONS?: NOT ASKED

## 2021-06-03 SDOH — ECONOMIC STABILITY: INCOME INSECURITY: HOW HARD IS IT FOR YOU TO PAY FOR THE VERY BASICS LIKE FOOD, HOUSING, MEDICAL CARE, AND HEATING?: NOT ASKED

## 2021-06-03 SDOH — ECONOMIC STABILITY: FOOD INSECURITY: WITHIN THE PAST 12 MONTHS, YOU WORRIED THAT YOUR FOOD WOULD RUN OUT BEFORE YOU GOT MONEY TO BUY MORE.: NOT ASKED

## 2021-06-03 ASSESSMENT — ACTIVITIES OF DAILY LIVING (ADL)
DEPENDENT_IADLS:: TRANSPORTATION
ADLS_ACUITY_SCORE: 15

## 2021-06-03 NOTE — PLAN OF CARE
[id number]    Blood pressure (!) 140/77, pulse 68, temperature 97  F (36.1  C), temperature source Oral, resp. rate 18, weight 94.5 kg (208 lb 4.8 oz), SpO2 93 %.    Hypertensive, on daily metoprolol; continue monitoring. Other VSS. Drowsy at times. Scheduled methadone and PRN Norco for throat pain management. Voids spontaneously, urinal at bedside. SBA d/t intermittent drowsiness. LR infusing at 100mL/hr. PEG tube placement scheduled for tomorrow; start PO contrast at 2000 and NPO starting midnight.     Continue w/ POC.

## 2021-06-03 NOTE — H&P
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     #SCC of the left tonsil (cT2, cN2, cM0)  #weight loss  #Failure to thrive  #Nausea  #Hypomagnesemia  Patient presenting with 10# weight loss in setting of known malignancy. Follows with Dr. Schrader of oncology currently in week 6 of chemoradiation, which is now on hold after discussion with oncology and radiation in hopes to get him a feeding tube placed for ongoing nutritional support given his weight loss, worsening renal function. As outpatient, were working on getting PEG placed via thoracic surgery, however has not happened yet so admitted to expedite care given his rapid decline.   -pain medications as below  -oncology consult placed  -will need to discuss with teams (thoracic vs GI vs IR) best team to place PEG; planned as thoracic surgery as outpatient  -nutrition consult; will start calorie counts  -zofran/compazine PRN  -continue silvadene for radiation induced injury; wound consult placed to assess to ensure ongoing adequate care  -continue omeprazole    #?SURESH on CKD2 vs progressive CKD  Patient with increased creatinine to 1.58 in setting of poor oral intake. Would favor SURESH on CKD2 d/t prerenal injury. Given IVF in clinic today. Will continue overnight and check repeat BMP  -trend BMP  -continue IVF overnight given NPO @ [facility]    #Chronic pain  Patient follows with Dr. Reza of palliative care. Currently on, per patient, methadone 10mg TID with up to 10 maximum  norco. Would like to discuss changing his methadone to 5mg Q4H with palliative care during his stay if  possible  -continue PTA regimen  -consider palliative care consult    #HLD  -continue crestor    #mucositis  -continue magic mouthwash, baking soda mouth rinses PRN    #Anxiety/depression  -continue atarax PRN (has been helpful per pt; would like to try increased dose to 50mg)  -continue celexa  -continue xanax PRN  -consider palliative care consult as above    Goals of Care  Had discussion with patient and spouse regarding his overall clinical status and his goals of care. In discussing with him, goals remain primarily around maintaining his function while also prolonging life with reasonable quality where able. Notes that they had not talked much about resuscitation status previously, but after discussion with provider and family, would like to be DNR/DNI. Will update code status to indicate this.          Diet: Calorie Counts  Regular Diet Adult  NPO for Medical/Clinical Reasons Except for: Meds, Ice Chips @ [facility]  DVT Prophylaxis: Pneumatic Compression Devices  Stone Catheter: not present  Code Status: No CPR- Do NOT Intubate         Disposition Plan   Expected discharge: 2 - 3 days, recommended to prior living arrangement once feeding tube placed.  Entered: Ruth Brantley MD 06/02/2021, 9:33 PM     The patient's care was discussed with the Bedside Nurse, Patient and Patient's Family.    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     Per clinic note:  New is doing poorly today and feels  defeated as he feels he is trying hard to follow recommendations. He is having pain and thick secretions that are limiting his PO intake. The secretions make him feel sick during radiation treatment. He is drinking water and drinks 1/4 of a 1300 calorie shake each day. No other new sx. Not sure when PEG is scheduled for.     When seen by this provider, notes much the same issues. States he just is unable to maintain adequate oral intake and inability to keep up with nutrition d/t phlegm, nausea, and poor appetite. States overall he is also having quite a bit of anxiety d/t managing his secretions.     No fever, chills, headache, abdominal pain, chest pain. Notes 10# weight loss over 2 weeks. Notes feeling of abdominal bloating with secretions. No reports of aspiration or feelings of choking.     Review of Systems    The 10 point Review of Systems is negative other than noted in the HPI or here.     Past Medical History    I have reviewed this patient's medical history and updated it with pertinent information if needed.   Past Medical History:   Diagnosis Date     Allergy, unspecified not elsewhere classified     Seasonal allergies, pollen, dust, smoke and animals     Antiplatelet or antithrombotic long-term use      Anxiety      Arthritis      Chest pain      Chronic sinusitis      Coronary atherosclerosis of unspecified type of vessel, native or graft     Coronary artery disease     Hyperlipidemia      Hypertension      Inguinal hernia      Kidney stones      Malignant neoplasm of prostate (H)     Prostate cancer     Prostate cancer (H)        Past Surgical History   I have reviewed this patient's surgical history and updated it with pertinent information if needed.  Past Surgical History:   Procedure Laterality Date     ARTHRODESIS FOOT  7/23/2013    Procedure: ARTHRODESIS FOOT;  Great Toe Arthrodesis Left Foot;  Surgeon: Ash Gonzalez DPM;  Location: [facility] OR     ARTHRODESIS FOOT  6/10/2014    Procedure:  ARTHRODESIS FOOT;  Surgeon: Ash Gonzalez DPM;  Location: [facility] OR     C LAPAROSCOPY, SURGICAL PROSTATECTOMY, RETROPUBIC RADICAL, W/NERVE SPARING  11/30/2004    With full bilateral pelvic lymphadenectomy.  F-[facility].     C TOTAL KNEE ARTHROPLASTY  05/01/08    Left knee     COLONOSCOPY  10/7/2013    Procedure: COLONOSCOPY;  Colonoscopy;  Surgeon: Mike Fallon MD;  Location: [facility] GI     ESOPHAGOSCOPY, GASTROSCOPY, DUODENOSCOPY (EGD), COMBINED N/A 2/12/2020    Procedure: ESOPHAGOGASTRODUODENOSCOPY (EGD);  Surgeon: Sam Escobar MD;  Location: [facility]     EXTRACORPOREAL SHOCK WAVE LITHOTRIPSY (ESWL) Bilateral 10/18/2017    Procedure: EXTRACORPOREAL SHOCK WAVE LITHOTRIPSY (ESWL);  BILATERAL EXTRACORPOREAL SHOCKWAVE LITHOTRIPSY ;  Surgeon: Meir Torres MD;  Location: [facility] OR     HC CORRECT BUNION,SIMPLE  08/11/2005    x3     HC REMV TOE BENIGN BONE LESN  08/11/2005     HERNIORRHAPHY INGUINAL  7/3/2013    Procedure: HERNIORRHAPHY INGUINAL;  Open Repair Inguinal hernia Right with mesh ;  Surgeon: Sam Escobar MD;  Location: [facility] OR     MOHS MICROGRAPHIC PROCEDURE  08/23/11    ear and chin-[facility]     OPEN REDUCTION INTERNAL FIXATION WRIST Right 7/18/2017    Procedure: OPEN REDUCTION INTERNAL FIXATION WRIST;  Right distal radius open reduction and internal fixation;  Surgeon: Pedro Blanca DO;  Location: [facility]     RECONSTRUCT FOREFOOT WITH METATARSOPHALANGEAL (MTP) FUSION  6/10/2014    Procedure: RECONSTRUCT FOREFOOT WITH METATARSOPHALANGEAL (MTP) FUSION;  Surgeon: Ash Gonzalez DPM;  Location: [facility] OR     STENT, CORONARY, DEMI       SURGICAL HISTORY OF -   1999/1974    lt knee     SURGICAL HISTORY OF -   10/2004    lithotripsy     SURGICAL HISTORY OF - 11/05    angiogram with stent       Social History   I have reviewed this patient's social history and updated it with pertinent information if needed.  Social History     Tobacco Use     Smoking status: Never Smoker     Smokeless  tobacco: Never Used   Substance Use Topics     Alcohol use: Yes     Alcohol/week: 8.3 standard drinks     Types: 10 Cans of beer per week     Comment: 2 beers a day      Drug use: No       Family History   I have reviewed this patient's family history and updated it with pertinent information if needed.  Family History   Problem Relation Age of Onset     Hypertension Father         has had MI      Connective Tissue Disorder Mother         LUPUS     Heart Disease Mother         poor valve-needing replacement       Prior to Admission Medications   Prior to Admission Medications   Prescriptions Last Dose Informant Patient Reported? Taking?   ALPRAZolam (XANAX) 0.5 MG tablet   No No   Sig: TAKE 1 TABLET (0.5 MG) BY MOUTH 4 TIMES DAILY AS NEEDED FOR ANXIETY   ASPIRIN ADULT LOW STRENGTH 81 MG EC tablet   No No   Sig: TAKE ONE TABLET BY MOUTH ONCE DAILY   HYDROcodone-acetaminophen (NORCO)  MG per tablet   No No   Sig: Take 1-2 tablets by mouth every 4 hours as needed for severe pain (No more than 10 a day. Stop dilaudid.)   citalopram (CELEXA) 40 MG tablet   No No   Sig: TAKE ONE TABLET BY MOUTH ONCE DAILY   doxepin (SINEQUAN) 10 MG/ML (HIGH CONC) solution   No No   Sig: Mouth pain: take 1.5 mL mixed with equal amount tap water. Swish for 60 sec then spit. Take every 4 h as needed. Sleep: take 0.6 mL at night. May repeat this once more overnight. Do not take more than 2 xanax overnight.   guaiFENesin (MUCINEX) 600 MG 12 hr tablet   No No   Sig: Take 2 tablets (1,200 mg) by mouth 2 times daily   hydrOXYzine (VISTARIL) 25 MG capsule   No No   Sig: TAKE 1 CAPSULE (25 MG) BY MOUTH 4 TIMES DAILY AS NEEDED FOR ANXIETY   magic mouthwash (ENTER INGREDIENTS IN COMMENTS) suspension   No No   Sig: Take 10 mLs by mouth every 4 hours as needed (pain) Swish and spit   methadone (DOLOPHINE) 5 MG tablet   Yes No   Sig: Take 2 tablets (10 mg) by mouth 3 times daily . NO LONGER TAKE IT 4 TIMES A DAY.   metoprolol succinate ER  (TOPROL-XL) 50 MG 24 hr tablet   No No   Sig: Take 1 tablet (50 mg) by mouth daily   naloxone (NARCAN) 4 MG/0.1ML nasal spray   Yes No   Sig: Spray 1 spray (4 mg) into one nostril alternating nostrils as needed for opioid reversal every 2-3 minutes until assistance arrives   nitroGLYcerin (NITROSTAT) 0.4 MG sublingual tablet   No No   Sig: For chest pain place 1 tablet under the tongue every 5 minutes for 3 doses. If symptoms persist 5 minutes after 1st dose call 911.   omeprazole (PRILOSEC) 40 MG DR capsule   No No   Sig: Take 1 capsule (40 mg) by mouth daily   ondansetron (ZOFRAN-ODT) 8 MG ODT tab   No No   Sig: Take 1 tablet (8 mg) by mouth every 8 hours as needed for nausea   prochlorperazine (COMPAZINE) 10 MG tablet   No No   Sig: Take 0.5 tablets (5 mg) by mouth every 6 hours as needed (Nausea/Vomiting)   rosuvastatin (CRESTOR) 40 MG tablet   No No   Sig: Take 1 tablet (40 mg) by mouth daily   silver sulfADIAZINE (SILVADENE) 1 % external cream   No No   Sig: Apply topically daily   testosterone (ANDROGEL 1.62 % PUMP) 20.25 MG/ACT gel   Yes No   Sig: testosterone 20.25 mg/1.25 gram (1.62 %) transdermal gel pump   zolpidem (AMBIEN) 10 MG tablet   No No   Sig: TAKE ONE TABLET BY MOUTH EVERY EVENING AS NEEDED FOR SLEEP      Facility-Administered Medications: None     Allergies   Allergies   Allergen Reactions     Animal Dander      Azithromycin Nausea and Vomiting     Dust Mites      Pollen Extract      Smoke.        Physical Exam   Vital Signs: Temp: 96.2  F (35.7  C) Temp src: Axillary   Pulse: 82   Resp: 16 SpO2: 98 % O2 Device: None (Room air)    Weight: 211 lbs 4.8 oz    General Appearance: chronically ill appearing male in NAD sitting in hospital bed, polite and conversational  HEENT: at/nc, eomi, significant phlegm in mouth/oral cavity, hoarse sounding with breathing, no stridor, managing secretions  Respiratory: ctab on ra, nonlabored  Cardiovascular: rrr, s1, s2  GI: soft, obese, nt  Skin: warm and dry,  evidence of prior radiation on neck  Musculoskeletal: no LE edema, moving all 4, no gross deformity  Neurologic: alert, interactive, speech fluent, grossly nonfocal    Data   Data reviewed today: I reviewed all medications, new labs and imaging results over the last 24 hours. I personally reviewed no images or EKG's today.    Recent Labs   Lab 06/02/21  0806   WBC 2.5*   HGB 10.3*   MCV 87   *      POTASSIUM 4.1   CHLORIDE 101   CO2 30   BUN 29   CR 1.58*   ANIONGAP 7   LATRELL 8.5   *   ALBUMIN 2.6*   PROTTOTAL 6.3*   BILITOTAL 0.6   ALKPHOS 54   ALT 23   AST 23

## 2021-06-03 NOTE — CONSULTS
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  He was seen in clinic on 6/2/2021 and was found to be doing extremely poorly with weakness, severely limited p.o. intake as well as difficulty managing secretions.  His last cisplatin was on 5/27.  He had been referred but that had not been scheduled yet for a PEG as outpatient so he was admitted for poor p.o. intake, failure to thrive and placement of feeding tube.    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 intake  #Failure to thrive    Summary of Recommendations:    Agree with placement of PEG tube tomorrow with IR    Nutrition consultation for tube feed recommendations    Electrolyte repletion per primary team.     May need close monitoring once feeds are started for risk of refeeding syndrome.     We will notify Dr. Schrader of his admission, and also discuss whether we should just skip the last dose of cisplatin (was due yesterday)    Thank you for involving us in the care of this patient. We will continue to follow during the hospitalization.    Patient was seen and plan of care developed with Dr. Reece           History of Present Illness:   History obtained from chart review and confirmed with patient.    Quan Murphy is a 70 year old male with a past medical history of squamous cell cancer of his left tonsil (oncology history outlined below) who is currently undergoing concurrent  chemoradiation with weekly cisplatin.  Dr. Schrader is his primary oncologist.. He presented for his clinic visit on 6/2/2021 with very limited p.o. intake and continued weight loss and failure to thrive.  He was admitted to the hospital to get IV fluids and undergo PEG tube placement for initiation of tube feeding.  At time of interview today he reports feeling quite weak and that radiation has been rough on him.  He and his wife are eager to get the tube placed so that he can start working on improving his nutritional status.      Oncology history  CANCER TYPE: SCC L tonsil, p16 +gardenia  STAGE: cT2N2 (II)  ECOG PS: 1     Cancer Staging  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)  Staging form: Pharynx - Oropharynx, AJCC 8th Edition  - Clinical stage from 4/13/2021: Stage II (cT2, cN2, cM0) - Signed by Shanthi Schrader MD on 4/13/2021        SUMMARY  3/22/21            L tonsil bx in clinic (Dr. Christensen). Path: SCC, non keratinizing, p16 +gardenia  3/23/21            CT neck. 2.9 x 2.7 x 3.5 cm L tonsil fullness involving soft palate, level 2-3 neck nodes  4/2/21              PET/CT. 2.7 x 2.2 x 2.7 cm L palatine tonsil mass (SUV 24), extension to oral cavity, 3.4 x 2.0 cm L level 4 node invading SCM, 1.8 x 1.8 cm L level 2A/3 node (SUV 7.9) w/ECS, 1.4 x 0.9 cm R level 2A node (SUV 6.7)     Concurrent chemoradiation with weekly cisplatin began 4/28/21          Review of Systems:   A comprehensive ROS was performed with the patient and was found to be negative with the exception of that noted in the HPI above.       Past Medical History:     Past Medical History:   Diagnosis Date     Allergy, unspecified not elsewhere classified     Seasonal allergies, pollen, dust, smoke and animals     Antiplatelet or antithrombotic long-term use      Anxiety      Arthritis      Chest pain      Chronic sinusitis      Coronary atherosclerosis of unspecified type of vessel, native or graft     Coronary artery disease     Hyperlipidemia       Hypertension      Inguinal hernia      Kidney stones      Malignant neoplasm of prostate (H)     Prostate cancer     Prostate cancer (H)             Past Surgical History:     Past Surgical History:   Procedure Laterality Date     ARTHRODESIS FOOT  7/23/2013    Procedure: ARTHRODESIS FOOT;  Great Toe Arthrodesis Left Foot;  Surgeon: Ash Gonzalez DPM;  Location: [facility] OR     ARTHRODESIS FOOT  6/10/2014    Procedure: ARTHRODESIS FOOT;  Surgeon: Ash Gonzalez DPM;  Location: [facility] OR     C LAPAROSCOPY, SURGICAL PROSTATECTOMY, RETROPUBIC RADICAL, W/NERVE SPARING  11/30/2004    With full bilateral pelvic lymphadenectomy.  F[facility].     C TOTAL KNEE ARTHROPLASTY  05/01/08    Left knee     COLONOSCOPY  10/7/2013    Procedure: COLONOSCOPY;  Colonoscopy;  Surgeon: Mike Fallon MD;  Location:  GI     ESOPHAGOSCOPY, GASTROSCOPY, DUODENOSCOPY (EGD), COMBINED N/A 2/12/2020    Procedure: ESOPHAGOGASTRODUODENOSCOPY (EGD);  Surgeon: Sam Escobar MD;  Location:  GI     EXTRACORPOREAL SHOCK WAVE LITHOTRIPSY (ESWL) Bilateral 10/18/2017    Procedure: EXTRACORPOREAL SHOCK WAVE LITHOTRIPSY (ESWL);  BILATERAL EXTRACORPOREAL SHOCKWAVE LITHOTRIPSY ;  Surgeon: Meir Torres MD;  Location: [facility] OR     HC CORRECT BUNION,SIMPLE  08/11/2005    x3     HC REMV TOE BENIGN BONE LESN  08/11/2005     HERNIORRHAPHY INGUINAL  7/3/2013    Procedure: HERNIORRHAPHY INGUINAL;  Open Repair Inguinal hernia Right with mesh ;  Surgeon: Sam Escobar MD;  Location: [facility] OR     MOHS MICROGRAPHIC PROCEDURE  08/23/11    ear and chin-[facility]     OPEN REDUCTION INTERNAL FIXATION WRIST Right 7/18/2017    Procedure: OPEN REDUCTION INTERNAL FIXATION WRIST;  Right distal radius open reduction and internal fixation;  Surgeon: Pedro Blanca DO;  Location: [facility] OR     RECONSTRUCT FOREFOOT WITH METATARSOPHALANGEAL (MTP) FUSION  6/10/2014    Procedure: RECONSTRUCT FOREFOOT WITH METATARSOPHALANGEAL (MTP) FUSION;  Surgeon:  Ash Gonzalez DPM;  Location: [facility] OR     STENT, CORONARY, DEMI       SURGICAL HISTORY OF -   1999/1974    lt knee     SURGICAL HISTORY OF -   10/2004    lithotripsy     SURGICAL HISTORY OF - 11/05    angiogram with stent            Social History:     Social History     Socioeconomic History     Marital status:      Spouse name: Alessandra     Number of children: 2     Years of education: Not on file     Highest education level: Not on file   Occupational History     Not on file   Social Needs     Financial resource strain: Not on file     Food insecurity     Worry: Not on file     Inability: Not on file     Transportation needs     Medical: Not on file     Non-medical: Not on file   Tobacco Use     Smoking status: Never Smoker     Smokeless tobacco: Never Used   Substance and Sexual Activity     Alcohol use: Yes     Alcohol/week: 8.3 standard drinks     Types: 10 Cans of beer per week     Comment: 2 beers a day      Drug use: No     Sexual activity: Yes     Partners: Female   Lifestyle     Physical activity     Days per week: Not on file     Minutes per session: Not on file     Stress: Not on file   Relationships     Social connections     Talks on phone: Not on file     Gets together: Not on file     Attends Zoroastrianism service: Not on file     Active member of club or organization: Not on file     Attends meetings of clubs or organizations: Not on file     Relationship status: Not on file     Intimate partner violence     Fear of current or ex partner: Not on file     Emotionally abused: Not on file     Physically abused: Not on file     Forced sexual activity: Not on file   Other Topics Concern      Service Not Asked     Blood Transfusions Not Asked     Caffeine Concern Not Asked     Occupational Exposure Not Asked     Hobby Hazards Not Asked     Sleep Concern Not Asked     Stress Concern Not Asked     Weight Concern Not Asked     Special Diet Not Asked     Back Care Not Asked     Exercise Not Asked      Bike Helmet Not Asked     Seat Belt Not Asked     Self-Exams Not Asked     Parent/sibling w/ CABG, MI or angioplasty before 65F 55M? Not Asked   Social History Narrative     Not on file            Family History:     Family History   Problem Relation Age of Onset     Hypertension Father         has had MI      Connective Tissue Disorder Mother         LUPUS     Heart Disease Mother         poor valve-needing replacement            Allergies:     Allergies   Allergen Reactions     Animal Dander      Azithromycin Nausea and Vomiting     Dust Mites      Pollen Extract      Smoke.             Medications:     Medications Prior to Admission   Medication Sig Dispense Refill Last Dose     ALPRAZolam (XANAX) 0.5 MG tablet TAKE 1 TABLET (0.5 MG) BY MOUTH 4 TIMES DAILY AS NEEDED FOR ANXIETY 120 tablet 0      ASPIRIN ADULT LOW STRENGTH 81 MG EC tablet TAKE ONE TABLET BY MOUTH ONCE DAILY 90 tablet 1      citalopram (CELEXA) 40 MG tablet TAKE ONE TABLET BY MOUTH ONCE DAILY 30 tablet 8      doxepin (SINEQUAN) 10 MG/ML (HIGH CONC) solution Mouth pain: take 1.5 mL mixed with equal amount tap water. Swish for 60 sec then spit. Take every 4 h as needed. Sleep: take 0.6 mL at night. May repeat this once more overnight. Do not take more than 2 xanax overnight. 118 mL 0      guaiFENesin (MUCINEX) 600 MG 12 hr tablet Take 2 tablets (1,200 mg) by mouth 2 times daily 60 tablet 1      HYDROcodone-acetaminophen (NORCO)  MG per tablet Take 1-2 tablets by mouth every 4 hours as needed for severe pain (No more than 10 a day. Stop dilaudid.) 150 tablet 0      hydrOXYzine (VISTARIL) 25 MG capsule TAKE 1 CAPSULE (25 MG) BY MOUTH 4 TIMES DAILY AS NEEDED FOR ANXIETY 120 capsule 3      magic mouthwash (ENTER INGREDIENTS IN COMMENTS) suspension Take 10 mLs by mouth every 4 hours as needed (pain) Swish and spit 200 mL 1      methadone (DOLOPHINE) 5 MG tablet Take 2 tablets (10 mg) by mouth 3 times daily . NO LONGER TAKE IT 4 TIMES A DAY. 120  tablet 0      metoprolol succinate ER (TOPROL-XL) 50 MG 24 hr tablet Take 1 tablet (50 mg) by mouth daily 90 tablet 3      naloxone (NARCAN) 4 MG/0.1ML nasal spray Spray 1 spray (4 mg) into one nostril alternating nostrils as needed for opioid reversal every 2-3 minutes until assistance arrives 0.2 mL       nitroGLYcerin (NITROSTAT) 0.4 MG sublingual tablet For chest pain place 1 tablet under the tongue every 5 minutes for 3 doses. If symptoms persist 5 minutes after 1st dose call 911. 25 tablet 0      omeprazole (PRILOSEC) 40 MG DR capsule Take 1 capsule (40 mg) by mouth daily 90 capsule 3      ondansetron (ZOFRAN-ODT) 8 MG ODT tab Take 1 tablet (8 mg) by mouth every 8 hours as needed for nausea 30 tablet 11      prochlorperazine (COMPAZINE) 10 MG tablet Take 0.5 tablets (5 mg) by mouth every 6 hours as needed (Nausea/Vomiting) 30 tablet 11      rosuvastatin (CRESTOR) 40 MG tablet Take 1 tablet (40 mg) by mouth daily 90 tablet 1      silver sulfADIAZINE (SILVADENE) 1 % external cream Apply topically daily 400 g 0      testosterone (ANDROGEL 1.62 % PUMP) 20.25 MG/ACT gel testosterone 20.25 mg/1.25 gram (1.62 %) transdermal gel pump        zolpidem (AMBIEN) 10 MG tablet TAKE ONE TABLET BY MOUTH EVERY EVENING AS NEEDED FOR SLEEP 30 tablet 5             Physical Exam:   /65 (BP Location: Right arm)   Pulse 69   Temp 97.3  F (36.3  C) (Oral)   Resp 18   Wt 94.5 kg (208 lb 4.8 oz)   SpO2 94%   BMI 31.21 kg/m    Vitals:    06/02/21 2022 06/03/21 0908   Weight: 95.8 kg (211 lb 4.8 oz) 94.5 kg (208 lb 4.8 oz)     General: Appears well, sitting in bed, in no acute distress.  Heme/Lymph: No overt bleeding.  Skin: No concerning lesions,.  Radiation-induced skin changes over the neck and upper chest   HEENT: NCAT. EOMI, anicteric sclera.   Respiratory: Non-labored breathing,  Cardiovascular: Regular rate   Gastrointestinal: Normoactive bowel sounds. Abdomen soft, non-distended, and non-tender.  Extremities: No  extremity edema.   Neurologic: A&O x 3,       Data:   I have personally reviewed the following labs/imaging:  CBC  Recent Labs   Lab 06/02/21 2152 06/02/21  0806   WBC 1.8* 2.5*   RBC 3.35* 3.62*   HGB 9.6* 10.3*   HCT 29.3* 31.4*   MCV 88 87   MCH 28.7 28.5   MCHC 32.8 32.8   RDW 14.6 14.4   PLT 88* 102*     CMP  Recent Labs   Lab 06/02/21 2152 06/02/21  0806    139   POTASSIUM 3.7 4.1   CHLORIDE 103 101   CO2 31 30   ANIONGAP 3 7   GLC 93 100*   BUN 24 29   CR 1.22 1.58*   GFRESTIMATED 60* 44*   GFRESTBLACK 69 51*   LATRELL 8.0* 8.5   MAG 1.5* 1.2*   PHOS 2.5  --    PROTTOTAL 5.8* 6.3*   ALBUMIN 2.4* 2.6*   BILITOTAL 0.5 0.6   ALKPHOS 49 54   AST 24 23   ALT 24 23     INRNo lab results found in last 7 days.

## 2021-06-03 NOTE — PROGRESS NOTES
CLINICAL NUTRITION SERVICES - ASSESSMENT NOTE     Nutrition Prescription    RECOMMENDATIONS FOR MDs/PROVIDERS TO ORDER:  Total fluids per Provider    Malnutrition Status:    Unable to determine at this time    Recommendations already ordered by Registered Dietitian (RD):  None at this time    Future/Additional Recommendations:  - Once enteral access (PEG) obtained and tube ready to use, recommend initiating with Jevity 1.5 Chad @ 10 ml/hr x 8 hrs. If tolerated and lytes within acceptable limits (K+ > 3.0, Mg++ > 1.5 and PO4 > 2.0), adv rate by 10 ml every 8 hrs to goal of 60 ml/hr. Regimen provides 1440 ml/day, 2160 kcals (28 kcals/kg), 92 g PRO (1.2 g/kg), 1094 ml free H20, 311 g CHO, and 30 g fiber daily.  -  Once TF initiated, order multivitamin/mineral (Certavite 15 ml/day) via TF to help ensure micronutrient needs are met due to potential for interruptions to infusion.  -  Once TF initiated, order daily check of K+, Mg and PO4 until TF adv to goal rate to evaluate for refeeding and need for lyte replacement ((per already ordered lyte protocol).  - H2O flushes of 30 ml every 4 hrs (for tube patency).    - Recommendations for transitioning to bolus TFs (only if FT is gastric)    Once patient is tolerating continuous goal TF rate for at least 8 hrs, would transition to bolus TFs as follows; Stop continuous TF for 1-2 hrs to let stomach empty prior to starting gravity feeding. Then begin first bolus with 0.5 cans (125 ml) and if tolerates after approx 4 hrs without GI complaints and/or residuals < 500 ml, rec adv each subsequent bolus by 0.5 cans (125 ml) every ~4 hrs until reach goal regimen of Jevity 1.5, 2 cans TID. Regimen provides 1422 ml, 2133 kcals (28 kcals/kg), 91 g PRO (1.2 g/kg), 1081 ml free H20, 307 g CHO, and 30 g fiber daily.     *Do not give feedings at night while patient asleep (during the day only).    * Change H2O flushes to 140 ml H2O before and after each bolus TID (to provide an addtl 840 ml  "H2O) to meet est fld needs.             REASON FOR ASSESSMENT  Quan Murphy is a/an 70 year old male assessed by the dietitian for positive Admission Nutrition Risk Screen for weight loss of  2 - 13 pounds and eating poorly d/t decreased appetite and Provider consult for failure to thrive.    NUTRITION HISTORY  Unable to obtain from pt secondary to sleeping at time of visit.   Per chart review, pt has been followed by outpatient RD with last visit via phone conversation on 6/1 regarding po progress and possible PEG placement in near future. Reported that pt's po intakes consisting of taking 2/3 to 1 full shake daily (1340 debby shake daily) as his primary source nutrition, but as had increased difficulty taking in these shakes due to gag relfex. PO intakes have also been hindered by odynophagia, mucositis and dysgeusia. Noted pt has been receiving chemo and radiation (week 6) in outpatient setting.     CURRENT NUTRITION ORDERS  Diet: adv from NPO to Regular @ 1330 today  Intakes: No meals ordered at this time    SLP consult for dysphagia, but unable x 2 attempts.    LABS  K+ and PO4 WNL, Mg ++ < normal x 2 days, but trending upward to current value of 1.5 (low)    MEDICATIONS  MAX-OX (po) TID (ordered today).  Pt also received IV dose of magnesium sulfate overnight.    ANTHROPOMETRICS  Height: 0 cm (Data Unavailable). Per chart review, height of 174 cm (5'8.5\").  Most Recent Weight: 94.5 kg (208 lbs) today - lowest wt this admission    IBW: 71.4 kg  BMI: Obesity Grade I BMI 30-34.9  Weight History: Per review of EMR, wt loss of 20 lbs (8.8% loss) x past 3 weeks.     Wt Readings from Last 10 Encounters:   06/03/21 94.5 kg (208 lb 4.8 oz)   06/03/21 95.7 kg (211 lb)   06/02/21 94.7 kg (208 lb 11.2 oz)   06/01/21 94.8 kg (209 lb)   05/28/21 97.6 kg (215 lb 1.6 oz)   05/27/21 97.3 kg (214 lb 6.4 oz)   05/26/21 96.3 kg (212 lb 3.2 oz)   05/20/21 100.7 kg (222 lb)   05/19/21 99.9 kg (220 lb 4.8 oz)   05/14/21 103.5 kg " (228 lb 3.2 oz)       Dosing Weight: 77 kg (adjusted based on lowest wt of 94.5 kg and IBW)    ASSESSED NUTRITION NEEDS  Estimated Energy Needs:1925 -2310 kcals/day (25 -30 kcals/kg)  Justification: Maintenance and Obese  Estimated Protein Needs:  grams protein/day (1.2 - 1.5 grams of pro/kg)  Justification: Repletion  Estimated Fluid Needs: (1 mL/kcal)   Justification: Maintenance    PHYSICAL FINDINGS  See malnutrition section below.  Chronically ill appearing   WOC consult for radiation burns. Recommendation made to apply silvadene cream to burn area then cover with layer of aquaphor ointment.        MALNUTRITION  % Intake: Unable to assess, but suspect poor d/t side effects from chemoradiation treatment  % Weight Loss: > 2% in 1 week (severe)  Subcutaneous Fat Loss: Unable to assess  Muscle Loss: Unable to assess  Fluid Accumulation/Edema: None noted per chart revieww  Malnutrition Diagnosis: Unable to determine due to unable to complete all parameters of nutritional assessment    NUTRITION DIAGNOSIS  Inadequate oral intake related to side effects from chemoradiation (decreased appetite, odynophagia, mucositis and dysgeusia) hindering po as evidenced by wt loss of 20 lbs (8.8% loss) x past 3 weeks, poor po intakes per chart review and no po intakes at present.    INTERVENTIONS  Implementation  Nutrition Education: No education needs assessed at this time   Collaboration with Provider regarding nutrition POC. Informed that plan is for pt to have a PEG placed by IR tomorrow (no NGT placement at this time)     Goals  Initiation of nutrition support within 24 to 48 hrs     Monitoring/Evaluation  Progress toward goals will be monitored and evaluated per protocol.    Marcelina Swanson RD,LD  6A/7D pager [phone]

## 2021-06-03 NOTE — PLAN OF CARE
0700 - 1500  VS stable, afebrile. Pt c/o throat/neck pain - PRN norco given x2. Pt denied SOB/n/v. Radiation done this AM. WOC nurse came to see pt for rash on neck - encouraged to use cream, see WOC orders. NPO at midnight in preparation for PEG tube placement 6/4 - will need to drink PO contrast tonight beginning at 2000. IV patent, LR infusing as ordered. Spouse at bedside. No significant events, continue POC.

## 2021-06-03 NOTE — PROGRESS NOTES
[facility]    Medicine Progress Note - Hospitalist Service, Gold 1       Date of Admission:  6/2/2021    Assessment & Plan            Quan Murphy is a 70 year old male with PMH of stage II SCC of the left tonsil (cT2, cN2, cM0) c/b limited oral intake and ongoing weight loss, CKD, mucositis, anxiety and depression who presents for failure to thrive, severe malnutrition and need for feeding tube placement.     #SCC of the left tonsil (cT2, cN2, cM0)  # severe malnutrition  #Failure to thrive  #Nausea  #Hypomagnesemia    Patient has had 10lb weight loss, very poor oral intake and difficulty taking medications.  Patient strongly doesn't want NJ tube, will pursue PEG with IR tomorrow.    - appreciate IR and oncology assistance  - nutrition consult, will monitor for RFS after TF start  - home care referral  -  Omeprazole  - NPO after MN  - appreciate assistance of Dr. Fang    # Radiation burns: continue silvadene, consider wound care consult    #?SURESH on CKD2 vs progressive CKD  Patient with increased creatinine to 1.58 in setting of poor oral intake, improved today.    -continue fluids  - follow BMP    #Chronic pain  Patient follows with Dr. Reza of palliative care. Currently on, per patient, methadone 10mg TID with up to 10 maximum  norco. Would like to discuss changing his methadone to 5mg Q4H with palliative care during his stay if possible    -continue PTA regimen, add IV for pain with drinking contrast  -consider palliative care consult    #HLD  -continue rosuvastatin     #mucositis  -continue magic mouthwash, baking soda mouth rinses PRN    #Anxiety/depression  -continue atarax PRN (has been helpful per pt; would like to try increased dose to 50mg)  -continue celexa  -continue xanax PRN  -consider palliative care consult as above    Goals of Care  This cancer is likely curable and will continue restorative goals.              Diet: Calorie Counts  NPO for  Medical/Clinical Reasons Except for: Meds, Ice Chips    DVT Prophylaxis: Pneumatic Compression Devices  Stone Catheter: not present  Code Status: No CPR- Do NOT Intubate           Disposition Plan   Expected discharge: 2 - 3 days, recommended to prior living arrangement once TF started.  Entered: Keyur Montemayor MD 06/03/2021, 7:16 AM       The patient's care was discussed with the Patient, Patient's Family and IR Consultant.    Keyur Montemayor MD  Hospitalist Service, [address]  Contact information available via [facility] Paging/Directory  Please see sign in/sign out for up to date coverage information  ______________________________________________________________________    Interval History      No major events, feeling better after hydration.  Pain well controlled but quite severe with oral intake.  Doesn't think he can tolerate a nasal feeding tube.    4 point review of systems otherwise negative.      Data reviewed today: I reviewed all medications, new labs and imaging results over the last 24 hours. I personally reviewed no images or EKG's today.    Physical Exam   Vital Signs: Temp: 99.2  F (37.3  C) Temp src: Oral BP: 133/77 Pulse: 90   Resp: 18 SpO2: 94 % O2 Device: Nasal cannula Oxygen Delivery: 1.5 LPM  Weight: 211 lbs 4.8 oz  General Appearance: Uncomfortable, sitting up in bed  Respiratory: clear to auscultation bilaterally with good air entry   Cardiovascular: normal rate, regular rhythm.   GI: soft, non-distended, non-distended.  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     Data   Recent Labs   Lab 06/02/21  2152 06/02/21  0806   WBC 1.8* 2.5*   HGB 9.6* 10.3*   MCV 88 87   PLT 88* 102*    139   POTASSIUM 3.7 4.1   CHLORIDE 103 101   CO2 31 30   BUN 24 29   CR 1.22 1.58*   ANIONGAP 3 7   LATRELL 8.0* 8.5    GLC 93 100*   ALBUMIN 2.4* 2.6*   PROTTOTAL 5.8* 6.3*   BILITOTAL 0.5 0.6   ALKPHOS 49 54   ALT 24 23   AST 24 23

## 2021-06-03 NOTE — UTILIZATION REVIEW
Admission Status; Secondary Review Determination       Under the authority of the Utilization Management Committee, the utilization review process indicated a secondary review on the above patient. The review outcome is based on review of the medical records, discussions with staff, and applying clinical experience noted on the date of the review.     (x) Inpatient Status Appropriate - This patient's medical care is consistent with medical management for inpatient care and reasonable inpatient medical practice.     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  This is a very complex patient with significant weight loss failure to thrive currently receiving chemoradiation with a severe mucositis requiring complex pain management nutrition consultation feeding tube insertion.  Has evidence of acute kidney injury    This is a conditional review for a Medicare patient, at the time of this review patient is anticipated to require at least 1 more night of hospital care; however if plan changes and discharges before 2 midnights please send for post discharge review.    This document was produced using voice recognition software       The information on this document is developed by the utilization review team in order for the business office to ensure compliance. This only denotes the appropriateness of proper admission status and does not reflect the quality of care rendered.   The definitions of Inpatient Status and Observation Status used in making the determination above are those provided in the CMS Coverage Manual, Chapter 1 and Chapter 6, section 70.4.   Sincerely,   TAYLOR MCKENZIE MD   System Medical Director   Utilization Management   [facility].

## 2021-06-03 NOTE — CONSULTS
Care Management Initial Consult    General Information  Assessment completed with: Patient, Care Team Member, -chart review   Type of CM/SW Visit: Initial Assessment    Primary Care Provider verified and updated as needed: Yes   Readmission within the last 30 days: No previous admission in last 30 days      Reason for Consult: Discharge Planning  Advance Care Planning: Reviewed: Present on chart        Communication Assessment  Patient's communication style: Spoken language (English or Bilingual)    Hearing Difficulty or Deaf: no   Wear Glasses or Blind: yes(reading)    Cognitive  Cognitive/Neuro/Behavioral: WDL                      Living Environment:   People in home: Spouse     Current living Arrangements: House      Able to return to prior arrangements: Yes    Family/Social Support:  Care provided by: Self, spouse/significant other  Provides care for: No one     Description of Support System: Supportive, Involved       Current Resources:   Patient receiving home care services: No  Community Resources: OP Infusion, Palliative Care  Equipment currently used at home: None  Supplies currently used at home: None    Employment/Financial:  Employment Status: Retired        Financial Concerns: No concerns identified     Lifestyle & Psychosocial Needs:        Socioeconomic History     Marital status:      Spouse name: Alessandra     Number of children: 2     Years of education: Not on file     Highest education level: Not on file   Occupational History     Occupation: Retired     Tobacco Use     Smoking status: Never Smoker     Smokeless tobacco: Never Used   Substance and Sexual Activity     Alcohol use: Yes     Alcohol/week: 8.3 standard drinks     Types: 10 Cans of beer per week     Comment: 2 beers a day      Drug use: No     Sexual activity: Yes     Partners: Female       Functional Status:  Prior to admission patient needed assistance:   Dependent ADLs: Independent  Dependent IADLs: Transportation  Assesssment of  Functional Status: At functional baseline    Mental Health Status:  Mental Health Status: Current Concern  Mental Health Management: Medication    Chemical Dependency Status:  Chemical Dependency Status: No Current Concerns       Values/Beliefs:  Spiritual, Cultural Beliefs, Rastafari Practices, Values that affect care: No               Additional Information:    Patient was admitted for poor oral intake, failure to thrive and placement of feeding tube. Per team, PEG tube will be placed Friday, with plans to start tube feeds.    Per protocol, writer submitted a benefit check to [facility] for enteral nutrition.     RNCC will follow.    Eve Oshea RN, BSN, PHN  Care Coordinator   P: [phone], [facility]

## 2021-06-03 NOTE — PLAN OF CARE
SLP: Bedside swallow eval orders received. Attempted to see pt x2, however pt initially off unit for radiation and then NPO for possible PEG per RN. Will follow up as appropriate.

## 2021-06-03 NOTE — CONSULTS
WO Nurse Inpatient Wound Assessment   Reason for consultation: Evaluate and treat  neck wounds    Assessment  Neck wounds due to radiation  Status: initial assessment    Treatment Plan  Neck wounds: Daily    Apply silvadene cream to burn area then cover with layer of aquaphor ointment.    Encourage pt to apply cream/ointment to improve pain over time.      Orders Written  Recommended provider order: None, at this time  WOC Nurse follow-up plan:weekly  Nursing to notify the Provider(s) and re-consult the WOC Nurse if wound(s) deteriorates or new skin concern.    Patient History  According to provider note(s):  Patient is a 70 year old male with PMH of stage II squamous cell carcinoma of left tonsil (cT2, cN2, cM0) with failure to thrive, who presents for consideration of PEG placement. He is currently afebrile and hemodynamically stable, in no apparent distress.    Objective Data    Active Diet Order  Orders Placed This Encounter      NPO for Medical/Clinical Reasons Except for: Meds, Ice Chips      NPO per Anesthesia Guidelines for Procedure/Surgery Except for: Meds      Output:   I/O last 3 completed shifts:  In: 861.67 [I.V.:861.67]  Out: 300 [Urine:300]    Risk Assessment:   Sensory Perception: 4-->no impairment  Moisture: 4-->rarely moist  Activity: 3-->walks occasionally  Mobility: 3-->slightly limited  Nutrition: 2-->probably inadequate  Friction and Shear: 3-->no apparent problem  Harjit Score: 19                          Labs:   Recent Labs   Lab 06/02/21  2152   ALBUMIN 2.4*   HGB 9.6*   WBC 1.8*       Physical Exam  Areas of skin assessed: focused neck    Wound Location:  Neck    Left neck    neck    Date of last photo 6/3  Wound History: Pt with radiation burns, very painful.  Per wife and pt uses silvadene and aquaphor ointment at home but has been refusing.  Declined WOC to apply today 6/3.  Would benefit from at least having aquaphor on burn area to soothe.      Wound Base: 50 % burn, 50% crusted  drainage     Palpation of the wound bed: normal      Drainage: scant     Description of drainage: serous     Measurements (length x width x depth, in cm) not measured, see pictures  Periwound skin: intact      Color: normal and consistent with surrounding tissue      Temperature: normal   Odor: none  Pain: moderate, aching      Interventions  Visual inspection and assessment completed   Wound Care Rationale Protect periwound skin, Provide protection  and Pain reduction  Wound Care: pt declined  Supplies: discussed with RN, discussed with family and discussed with patient  Current off-loading measures: Pillows  Current support surface: Standard  Atmos Air mattress  Education provided to: importance of repositioning, plan of care and wound progress  Discussed plan of care with Patient and Nurse    Michelle Stanton RN

## 2021-06-03 NOTE — PROGRESS NOTES
RADIATION ONCOLOGY WEEKLY ON TREATMENT VISIT   Encounter Date: Radha 3, 2021    Patient Name: Quan Murphy  MRN: [id number]  : 1950     Disease and Stage: cT2 N2 M0 p16 positive squamous cell carcinoma of the left tonsil  Treatment Site: Oropharynx and bilateral neck  Current Dose/Planned Total Dose: 5300 / 6996 cGy  Daily Fraction Size: 212 cGy/day, 5 times/week  Concurrent Chemotherapy: Yes  Drug and Frequency: Cisplatin (40 mg/m ) weekly    Treatment Summary:    2021: Initiation of radiotherapy. Cycle 1, day 1 of weekly cisplatin.    2021: Weekly RT visit. Current dose of 424 cGy. Tolerating treatment well. No issues.    2021: Cycle 1, day 8 of weekly cisplatin.    2021: Weekly RT visit. Current dose of 1484 cGy. Tolerating treatment well.    2021: Cycle 1, day 15 of weekly cisplatin    2021: Weekly RT visit. Current dose of 2544 cGy. Moderate oropharyngeal pain.    2021: Cycle one, day 22 of weekly cisplatin    2021: Weekly RT visit. Current dose of 3604 cGy. Moderate to severe odynophagia. 2 kg weight loss over the past week.    2021: Cycle 1, day 29 of weekly cisplatin. Weekly RT visit. Current dose of 4664 cGy. Severe oral cavity and oropharyngeal pain. 3 kg weight loss over the past week.    2021: Admitted for failure to thrive    6/3/2021: Weekly RT visit. Current dose of 5300 cGy. Moderate to severe oropharyngeal pain. Minimal p.o. intake. Weight down approximately 2 kg over the past week.    ED visits/Hospitalizations:  1. 2021: Presentation to ED with low-grade fevers (99.6-100.1  F) and tachycardia to the low 100-120s. Work-up negative. Symptoms improved with IV fluid support and pain medication.  2. 2021 - present: Admitted for failure to thrive and expedited PEG placement.    Missed Treatments:  1. 2021: 1-day treatment break between fractions 23-24 secondary to the Memorial Day holiday  2. 2021: 1-day treatment break between  fractions 24-25 secondary to hospital admission    Subjective: Mr. Murphy presents to clinic today for his weekly on-treatment visit.  He was admitted yesterday for failure to thrive and expedited PEG placement given his inability to tolerate p.o. with ongoing weight loss.    ROS:   Constitutional  Pain (0-10): 7  Fatigue: Moderate to severe    CNS  Headaches: None    ENT  Mucositis: Severe    Cardiopulmonary  Dyspnea: None    GI  Nausea/vomiting: None    Nutrition  PEG: No  Diet: Puréed/liquid    Integumentary  Dermatitis: Moderate    Objective:   Current weight: 95.7 kg  Last week's weight: 97.3 kg  Starting weight: 105.7 kg    BP: 142/70   Pulse: 68     General: Mildly fatigued-appearing 70-year-old gentleman seated in a wheelchair in no acute distress  HEENT: NC/AT.  EOMI.  No rhinorrhea or epistaxis.  Thickened oral cavity secretions.  Confluent mucositis involving the left tonsillar fossa with extension onto the left soft palate and patchy shallow ulcerations throughout the remainder of the posterior oral cavity and visualized oropharynx.  No evidence of oral thrush.  Pulmonary: No wheezing, stridor or respiratory distress  Skin: Brisk erythema of the bilateral neck.  Patchy desquamation confined to the skin folds.    Treatment-related toxicities (CTCAE v5.0):    Mucositis: Grade 3    Dermatitis: Grade 2    Assessment:    Mr. 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 He is receiving curative-intent concurrent chemoradiation with weekly cisplatin. He continues to have moderate to severe mucositis and dermatitis and is now admitted due to poor p.o. intake and failure to thrive.    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:    Continue chemoradiotherapy    Pain management:    Continue pain control regimen per inpatient team    Fluids/Electrolytes/Nutrition:    Continue with PEG placement during this  hospitalization    Follow-up with oncology dietitian for ongoing nutritional assessment    Dermatitis:    Continue BID/TID Aquaphor use to the lower face bilateral neck    Continue Silvadene application twice daily to the areas of moist desquamation    Mucositis:    Pain control as described above    Continue frequent salt/soda rinses    Anxiety:    Continue Celexa    Mosaiq chart and setup information reviewed  IGRT images reviewed    Medication list reviewed    Ahmet Robbins MD/PhD    Dept of Radiation Oncology  [facility]

## 2021-06-03 NOTE — CONSULTS
Interventional Radiology Consult Service Note    Patient is on IR schedule 6/4 for a Image guided placement of a percutaneous gastrostomy tube.   Labs WNL for procedure. COVID neg.    Orders for NPO, scrubs, contrast and antibiotics have been entered.  Consent will be done prior to procedure.     Please contact the IR charge RN at [phone] for estimated time of procedure.     Case discussed with Dr. Jon from IR and Dr. Montemayor. This is a 70 year old man with PMH significant for stage II squamous cell carcinoma of left tonsil (cT2, cN2, cM0) with limited oral intake due to dysphagia secondary to chemoradiation and increased secretions. Admitted 6/2 as a direct admission for consideration of feeding tube placement. No history of abdominal surgery. IR is consulted for G tube placement. Primary team is looking to do this expeditiously as this is the reason for admission. Pt does not have NG access in place due to mucositis. He is reportedly able to drink oral contrast. IR will plan to proceed with G tube placement 6/4, NG will be placed by IR for insufflation of the stomach, after pt is sedated. This will be removed at the end of our procedure.    Tube must remain in x6 weeks after placement.     Expected date of discharge: TBD (ideally after G tube placement)    Vitals:   /65 (BP Location: Right arm)   Pulse 69   Temp 97.3  F (36.3  C) (Oral)   Resp 18   Wt 94.5 kg (208 lb 4.8 oz)   SpO2 94%   BMI 31.21 kg/m      Pertinent Labs:     Lab Results   Component Value Date    WBC 1.8 (L) 06/02/2021    WBC 2.5 (L) 06/02/2021    WBC 2.5 (L) 05/26/2021       Lab Results   Component Value Date    HGB 9.6 06/02/2021    HGB 10.3 06/02/2021    HGB 10.7 05/26/2021       Lab Results   Component Value Date    PLT 88 06/02/2021     06/02/2021     05/26/2021       Lab Results   Component Value Date    INR 1.08 10/28/2017    PTT 47 (H) 10/28/2017       Lab Results   Component Value Date    POTASSIUM 3.7  06/02/2021        MARCY Hernandez CNP  Interventional Radiology  Pager: [phone]

## 2021-06-03 NOTE — PROGRESS NOTES
CLINICAL NUTRITION SERVICES - BRIEF NOTE      Order received for calorie counts. Pt is currently NPO and hence unable to take po at this time.    Intervention:  - Discontinue order for calorie counts      Marcelina Swanson RD,LD  6A/7D pager [phone]

## 2021-06-03 NOTE — PLAN OF CARE
PT: Pt gone for radiation now, also has PEG scheduled for today. PT will check back as schedules allow and as pt appropriate. Otherwise PT will reschedule eval to tomorrow.

## 2021-06-03 NOTE — PLAN OF CARE
Time: 4068-7852    Afebrile with a max BP of 147/80, OVSS on 1.5L of O2 via nasal cannula. Mag+ replaced, check with AM labs. PRN NORCO given. NPO since midnight for possible PEG tube placement today. SBA. PIV infusing LR 100mL/hr. Adequate UO into bedside urinal. Linens changed for water spill. A/Ox4 but very drowsy even before pain meds. Continue with POC.

## 2021-06-03 NOTE — CONSULTS
THORACIC SURGERY CONSULT NOTE    Consult Reason: PEG placement    HPI: 70 year old man with PMH significant for stage II squamous cell carcinoma of left tonsil (cT2, cN2, cM0) with limited oral intake due to dysphagia  secondary to chemoradiation and increased secretions.  Patient notes a 10 pound weight loss over the last 2 weeks. States he was supposed to see thoracic surgery for placement of PEG due to ongoing weight loss, is to be seen by thoracic surgery as outpatient to schedule PEG tube placement. No previous abdominal surgeries. Patient currently denies any Chest pain, SOB, difficulty breathing, constipation, nausea or vomiting.    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 He is currently afebrile and hemodynamically stable, in no apparent distress.    - Will attempt to schedule while patient inpatient    Discussed with [facility] Resident Dr. Redmond who discussed with staff    PMH:  Past Medical History:   Diagnosis Date     Allergy, unspecified not elsewhere classified     Seasonal allergies, pollen, dust, smoke and animals     Antiplatelet or antithrombotic long-term use      Anxiety      Arthritis      Chest pain      Chronic sinusitis      Coronary atherosclerosis of unspecified type of vessel, native or graft     Coronary artery disease     Hyperlipidemia      Hypertension      Inguinal hernia      Kidney stones      Malignant neoplasm of prostate (H)     Prostate cancer     Prostate cancer (H)        PSH:  Past Surgical History:   Procedure Laterality Date     ARTHRODESIS FOOT  7/23/2013    Procedure: ARTHRODESIS FOOT;  Great Toe Arthrodesis Left Foot;  Surgeon: Ash Gonzalez DPM;  Location: [facility] OR     ARTHRODESIS FOOT  6/10/2014    Procedure: ARTHRODESIS FOOT;  Surgeon: Ash Gonzalez DPM;  Location: [facility] OR     C LAPAROSCOPY, SURGICAL PROSTATECTOMY,  RETROPUBIC RADICAL, W/NERVE SPARING  11/30/2004    With full bilateral pelvic lymphadenectomy.  [facility].     C TOTAL KNEE ARTHROPLASTY  05/01/08    Left knee     COLONOSCOPY  10/7/2013    Procedure: COLONOSCOPY;  Colonoscopy;  Surgeon: Mike Fallon MD;  Location: [facility] GI     ESOPHAGOSCOPY, GASTROSCOPY, DUODENOSCOPY (EGD), COMBINED N/A 2/12/2020    Procedure: ESOPHAGOGASTRODUODENOSCOPY (EGD);  Surgeon: Sam Escobar MD;  Location: [facility]     EXTRACORPOREAL SHOCK WAVE LITHOTRIPSY (ESWL) Bilateral 10/18/2017    Procedure: EXTRACORPOREAL SHOCK WAVE LITHOTRIPSY (ESWL);  BILATERAL EXTRACORPOREAL SHOCKWAVE LITHOTRIPSY ;  Surgeon: Meir Torres MD;  Location: [facility] OR     HC CORRECT BUNION,SIMPLE  08/11/2005    x3     HC REMV TOE BENIGN BONE LESN  08/11/2005     HERNIORRHAPHY INGUINAL  7/3/2013    Procedure: HERNIORRHAPHY INGUINAL;  Open Repair Inguinal hernia Right with mesh ;  Surgeon: Sam Escobar MD;  Location: [facility] OR     MOHS MICROGRAPHIC PROCEDURE  08/23/11    ear and chin-[facility]     OPEN REDUCTION INTERNAL FIXATION WRIST Right 7/18/2017    Procedure: OPEN REDUCTION INTERNAL FIXATION WRIST;  Right distal radius open reduction and internal fixation;  Surgeon: Pedro Blanca DO;  Location: [facility] OR     RECONSTRUCT FOREFOOT WITH METATARSOPHALANGEAL (MTP) FUSION  6/10/2014    Procedure: RECONSTRUCT FOREFOOT WITH METATARSOPHALANGEAL (MTP) FUSION;  Surgeon: Ash Gonzalez DPM;  Location: [facility] OR     STENT, CORONARY, DEMI       SURGICAL HISTORY OF -   1999/1974    lt knee     SURGICAL HISTORY OF -   10/2004    lithotripsy     SURGICAL HISTORY OF - 11/05    angiogram with stent       FH:  family history includes Connective Tissue Disorder in his mother; Heart Disease in his mother; Hypertension in his father.      Allergies:  Allergies   Allergen Reactions     Animal Dander      Azithromycin Nausea and Vomiting     Dust Mites      Pollen Extract      Smoke.        Home  Meds:  Medications Prior to Admission   Medication Sig Dispense Refill Last Dose     ALPRAZolam (XANAX) 0.5 MG tablet TAKE 1 TABLET (0.5 MG) BY MOUTH 4 TIMES DAILY AS NEEDED FOR ANXIETY 120 tablet 0      ASPIRIN ADULT LOW STRENGTH 81 MG EC tablet TAKE ONE TABLET BY MOUTH ONCE DAILY 90 tablet 1      citalopram (CELEXA) 40 MG tablet TAKE ONE TABLET BY MOUTH ONCE DAILY 30 tablet 8      doxepin (SINEQUAN) 10 MG/ML (HIGH CONC) solution Mouth pain: take 1.5 mL mixed with equal amount tap water. Swish for 60 sec then spit. Take every 4 h as needed. Sleep: take 0.6 mL at night. May repeat this once more overnight. Do not take more than 2 xanax overnight. 118 mL 0      guaiFENesin (MUCINEX) 600 MG 12 hr tablet Take 2 tablets (1,200 mg) by mouth 2 times daily 60 tablet 1      HYDROcodone-acetaminophen (NORCO)  MG per tablet Take 1-2 tablets by mouth every 4 hours as needed for severe pain (No more than 10 a day. Stop dilaudid.) 150 tablet 0      hydrOXYzine (VISTARIL) 25 MG capsule TAKE 1 CAPSULE (25 MG) BY MOUTH 4 TIMES DAILY AS NEEDED FOR ANXIETY 120 capsule 3      magic mouthwash (ENTER INGREDIENTS IN COMMENTS) suspension Take 10 mLs by mouth every 4 hours as needed (pain) Swish and spit 200 mL 1      methadone (DOLOPHINE) 5 MG tablet Take 2 tablets (10 mg) by mouth 3 times daily . NO LONGER TAKE IT 4 TIMES A DAY. 120 tablet 0      metoprolol succinate ER (TOPROL-XL) 50 MG 24 hr tablet Take 1 tablet (50 mg) by mouth daily 90 tablet 3      naloxone (NARCAN) 4 MG/0.1ML nasal spray Spray 1 spray (4 mg) into one nostril alternating nostrils as needed for opioid reversal every 2-3 minutes until assistance arrives 0.2 mL       nitroGLYcerin (NITROSTAT) 0.4 MG sublingual tablet For chest pain place 1 tablet under the tongue every 5 minutes for 3 doses. If symptoms persist 5 minutes after 1st dose call 911. 25 tablet 0      omeprazole (PRILOSEC) 40 MG DR capsule Take 1 capsule (40 mg) by mouth daily 90 capsule 3       ondansetron (ZOFRAN-ODT) 8 MG ODT tab Take 1 tablet (8 mg) by mouth every 8 hours as needed for nausea 30 tablet 11      prochlorperazine (COMPAZINE) 10 MG tablet Take 0.5 tablets (5 mg) by mouth every 6 hours as needed (Nausea/Vomiting) 30 tablet 11      rosuvastatin (CRESTOR) 40 MG tablet Take 1 tablet (40 mg) by mouth daily 90 tablet 1      silver sulfADIAZINE (SILVADENE) 1 % external cream Apply topically daily 400 g 0      testosterone (ANDROGEL 1.62 % PUMP) 20.25 MG/ACT gel testosterone 20.25 mg/1.25 gram (1.62 %) transdermal gel pump        zolpidem (AMBIEN) 10 MG tablet TAKE ONE TABLET BY MOUTH EVERY EVENING AS NEEDED FOR SLEEP 30 tablet 5        ROS: 10 point review of system negative unless specified in HPI    Physical Exam:  Temp:  [96.2  F (35.7  C)-99.7  F (37.6  C)] 97.3  F (36.3  C)  Pulse:  [65-90] 69  Resp:  [16-20] 18  BP: (124-147)/(65-80) 124/65  SpO2:  [88 %-98 %] 94 %    Awake, alert, NAD  Non labored breathing on RA  RRR  Soft, non tender, non distended  Warm well perfused  No edema  Pleasant and conversative      Labs:  CBC  Recent Labs   Lab 06/02/21  2152 06/02/21  0806   WBC 1.8* 2.5*   HGB 9.6* 10.3*   PLT 88* 102*     BMP  Recent Labs   Lab 06/02/21 2152 06/02/21  0806    139   POTASSIUM 3.7 4.1   CHLORIDE 103 101   CO2 31 30   BUN 24 29   CR 1.22 1.58*   GLC 93 100*     LFT  Recent Labs   Lab 06/02/21  2152 06/02/21  0806   AST 24 23   ALT 24 23   ALKPHOS 49 54   BILITOTAL 0.5 0.6   ALBUMIN 2.4* 2.6*       Trever Glover MD  Surgery Resident PGY1

## 2021-06-04 ENCOUNTER — PRE VISIT (OUTPATIENT)
Dept: SURGERY | Facility: CLINIC | Age: 71
End: 2021-06-04

## 2021-06-04 ENCOUNTER — APPOINTMENT (OUTPATIENT)
Dept: GENERAL RADIOLOGY | Facility: CLINIC | Age: 71
DRG: 640 | End: 2021-06-04
Attending: INTERNAL MEDICINE
Payer: MEDICARE

## 2021-06-04 ENCOUNTER — ALLIED HEALTH/NURSE VISIT (OUTPATIENT)
Dept: RADIATION ONCOLOGY | Facility: CLINIC | Age: 71
End: 2021-06-04
Attending: RADIOLOGY
Payer: MEDICARE

## 2021-06-04 ENCOUNTER — APPOINTMENT (OUTPATIENT)
Dept: SPEECH THERAPY | Facility: CLINIC | Age: 71
DRG: 640 | End: 2021-06-04
Attending: STUDENT IN AN ORGANIZED HEALTH CARE EDUCATION/TRAINING PROGRAM
Payer: MEDICARE

## 2021-06-04 LAB
ALBUMIN SERPL-MCNC: 2.3 G/DL (ref 3.4–5)
ALP SERPL-CCNC: 60 U/L (ref 40–150)
ALT SERPL W P-5'-P-CCNC: 29 U/L (ref 0–70)
ANION GAP SERPL CALCULATED.3IONS-SCNC: 4 MMOL/L (ref 3–14)
AST SERPL W P-5'-P-CCNC: 34 U/L (ref 0–45)
BASE EXCESS BLDA CALC-SCNC: 5.5 MMOL/L
BASOPHILS # BLD AUTO: 0 10E9/L (ref 0–0.2)
BASOPHILS NFR BLD AUTO: 0.4 %
BILIRUB SERPL-MCNC: 0.4 MG/DL (ref 0.2–1.3)
BUN SERPL-MCNC: 21 MG/DL (ref 7–30)
CALCIUM SERPL-MCNC: 8.8 MG/DL (ref 8.5–10.1)
CHLORIDE SERPL-SCNC: 102 MMOL/L (ref 94–109)
CO2 SERPL-SCNC: 31 MMOL/L (ref 20–32)
CREAT SERPL-MCNC: 1.29 MG/DL (ref 0.66–1.25)
DIFFERENTIAL METHOD BLD: ABNORMAL
EOSINOPHIL # BLD AUTO: 0 10E9/L (ref 0–0.7)
EOSINOPHIL NFR BLD AUTO: 0 %
ERYTHROCYTE [DISTWIDTH] IN BLOOD BY AUTOMATED COUNT: 15 % (ref 10–15)
GFR SERPL CREATININE-BSD FRML MDRD: 56 ML/MIN/{1.73_M2}
GLUCOSE BLDC GLUCOMTR-MCNC: 118 MG/DL (ref 70–99)
GLUCOSE BLDC GLUCOMTR-MCNC: 135 MG/DL (ref 70–99)
GLUCOSE SERPL-MCNC: 131 MG/DL (ref 70–99)
GRAM STN SPEC: NORMAL
HCO3 BLD-SCNC: 29 MMOL/L (ref 21–28)
HCT VFR BLD AUTO: 32.6 % (ref 40–53)
HGB BLD-MCNC: 10.3 G/DL (ref 13.3–17.7)
IMM GRANULOCYTES # BLD: 0 10E9/L (ref 0–0.4)
IMM GRANULOCYTES NFR BLD: 0.4 %
INTERPRETATION ECG - MUSE: NORMAL
LACTATE BLD-SCNC: 2.8 MMOL/L (ref 0.7–2)
LYMPHOCYTES # BLD AUTO: 0.1 10E9/L (ref 0.8–5.3)
LYMPHOCYTES NFR BLD AUTO: 3.5 %
Lab: NORMAL
MAGNESIUM SERPL-MCNC: 1.8 MG/DL (ref 1.6–2.3)
MCH RBC QN AUTO: 28.6 PG (ref 26.5–33)
MCHC RBC AUTO-ENTMCNC: 31.6 G/DL (ref 31.5–36.5)
MCV RBC AUTO: 91 FL (ref 78–100)
MONOCYTES # BLD AUTO: 0.5 10E9/L (ref 0–1.3)
MONOCYTES NFR BLD AUTO: 21.1 %
MRSA DNA SPEC QL NAA+PROBE: NEGATIVE
NEUTROPHILS # BLD AUTO: 1.7 10E9/L (ref 1.6–8.3)
NEUTROPHILS NFR BLD AUTO: 74.6 %
NRBC # BLD AUTO: 0 10*3/UL
NRBC BLD AUTO-RTO: 0 /100
O2/TOTAL GAS SETTING VFR VENT: 100 %
PCO2 BLD: 35 MM HG (ref 35–45)
PH BLD: 7.52 PH (ref 7.35–7.45)
PLATELET # BLD AUTO: 116 10E9/L (ref 150–450)
PLATELET # BLD EST: ABNORMAL 10*3/UL
PO2 BLD: 300 MM HG (ref 80–105)
POTASSIUM SERPL-SCNC: 4.6 MMOL/L (ref 3.4–5.3)
PROT SERPL-MCNC: 6 G/DL (ref 6.8–8.8)
RBC # BLD AUTO: 3.6 10E12/L (ref 4.4–5.9)
SODIUM SERPL-SCNC: 138 MMOL/L (ref 133–144)
SPECIMEN SOURCE: NORMAL
SPECIMEN SOURCE: NORMAL
TROPONIN I SERPL-MCNC: 0.04 UG/L (ref 0–0.04)
WBC # BLD AUTO: 2.3 10E9/L (ref 4–11)

## 2021-06-04 PROCEDURE — 999N000157 HC STATISTIC RCP TIME EA 10 MIN

## 2021-06-04 PROCEDURE — 83605 ASSAY OF LACTIC ACID: CPT | Performed by: INTERNAL MEDICINE

## 2021-06-04 PROCEDURE — 87070 CULTURE OTHR SPECIMN AEROBIC: CPT | Performed by: STUDENT IN AN ORGANIZED HEALTH CARE EDUCATION/TRAINING PROGRAM

## 2021-06-04 PROCEDURE — 71045 X-RAY EXAM CHEST 1 VIEW: CPT | Mod: 26 | Performed by: RADIOLOGY

## 2021-06-04 PROCEDURE — 272N000588 ZZ HC TUBE GASTRO CR5

## 2021-06-04 PROCEDURE — 84484 ASSAY OF TROPONIN QUANT: CPT | Performed by: INTERNAL MEDICINE

## 2021-06-04 PROCEDURE — 85025 COMPLETE CBC W/AUTO DIFF WBC: CPT | Performed by: INTERNAL MEDICINE

## 2021-06-04 PROCEDURE — 82803 BLOOD GASES ANY COMBINATION: CPT | Performed by: STUDENT IN AN ORGANIZED HEALTH CARE EDUCATION/TRAINING PROGRAM

## 2021-06-04 PROCEDURE — 999N000127 HC STATISTIC PERIPHERAL IV START W US GUIDANCE

## 2021-06-04 PROCEDURE — 999N000043 HC STATISTIC CTO2 CONT OXYGEN TECH TIME EA 90 MIN

## 2021-06-04 PROCEDURE — 71045 X-RAY EXAM CHEST 1 VIEW: CPT

## 2021-06-04 PROCEDURE — 999N000035 HC STATISTIC CODE BLUE NO ACCESS REQUIRED

## 2021-06-04 PROCEDURE — 250N000011 HC RX IP 250 OP 636: Performed by: STUDENT IN AN ORGANIZED HEALTH CARE EDUCATION/TRAINING PROGRAM

## 2021-06-04 PROCEDURE — 92610 EVALUATE SWALLOWING FUNCTION: CPT | Mod: GN

## 2021-06-04 PROCEDURE — 80053 COMPREHEN METABOLIC PANEL: CPT | Performed by: INTERNAL MEDICINE

## 2021-06-04 PROCEDURE — 36415 COLL VENOUS BLD VENIPUNCTURE: CPT | Performed by: INTERNAL MEDICINE

## 2021-06-04 PROCEDURE — 77014 PR CT GUIDE FOR PLACEMENT RADIATION THERAPY FIELDS: CPT | Mod: 26 | Performed by: RADIOLOGY

## 2021-06-04 PROCEDURE — 93005 ELECTROCARDIOGRAM TRACING: CPT

## 2021-06-04 PROCEDURE — 99233 SBSQ HOSP IP/OBS HIGH 50: CPT | Performed by: STUDENT IN AN ORGANIZED HEALTH CARE EDUCATION/TRAINING PROGRAM

## 2021-06-04 PROCEDURE — 87077 CULTURE AEROBIC IDENTIFY: CPT | Performed by: STUDENT IN AN ORGANIZED HEALTH CARE EDUCATION/TRAINING PROGRAM

## 2021-06-04 PROCEDURE — 87040 BLOOD CULTURE FOR BACTERIA: CPT | Performed by: STUDENT IN AN ORGANIZED HEALTH CARE EDUCATION/TRAINING PROGRAM

## 2021-06-04 PROCEDURE — 272N000151 HC KIT CR11

## 2021-06-04 PROCEDURE — 83735 ASSAY OF MAGNESIUM: CPT | Performed by: INTERNAL MEDICINE

## 2021-06-04 PROCEDURE — 120N000002 HC R&B MED SURG/OB UMMC

## 2021-06-04 PROCEDURE — 92526 ORAL FUNCTION THERAPY: CPT | Mod: GN

## 2021-06-04 PROCEDURE — 99291 CRITICAL CARE FIRST HOUR: CPT | Mod: GC | Performed by: INTERNAL MEDICINE

## 2021-06-04 PROCEDURE — 250N000013 HC RX MED GY IP 250 OP 250 PS 637: Performed by: STUDENT IN AN ORGANIZED HEALTH CARE EDUCATION/TRAINING PROGRAM

## 2021-06-04 PROCEDURE — 87641 MR-STAPH DNA AMP PROBE: CPT | Performed by: STUDENT IN AN ORGANIZED HEALTH CARE EDUCATION/TRAINING PROGRAM

## 2021-06-04 PROCEDURE — 82962 GLUCOSE BLOOD TEST: CPT

## 2021-06-04 PROCEDURE — 87640 STAPH A DNA AMP PROBE: CPT | Performed by: STUDENT IN AN ORGANIZED HEALTH CARE EDUCATION/TRAINING PROGRAM

## 2021-06-04 PROCEDURE — 77386 HC IMRT TREATMENT DELIVERY, COMPLEX: CPT | Performed by: RADIOLOGY

## 2021-06-04 PROCEDURE — 87205 SMEAR GRAM STAIN: CPT | Performed by: STUDENT IN AN ORGANIZED HEALTH CARE EDUCATION/TRAINING PROGRAM

## 2021-06-04 PROCEDURE — 99222 1ST HOSP IP/OBS MODERATE 55: CPT | Performed by: NURSE PRACTITIONER

## 2021-06-04 PROCEDURE — 36415 COLL VENOUS BLD VENIPUNCTURE: CPT | Performed by: STUDENT IN AN ORGANIZED HEALTH CARE EDUCATION/TRAINING PROGRAM

## 2021-06-04 PROCEDURE — 258N000003 HC RX IP 258 OP 636: Performed by: STUDENT IN AN ORGANIZED HEALTH CARE EDUCATION/TRAINING PROGRAM

## 2021-06-04 PROCEDURE — 93010 ELECTROCARDIOGRAM REPORT: CPT | Performed by: INTERNAL MEDICINE

## 2021-06-04 PROCEDURE — 999N000215 HC STATISTIC HFNC ADULT NON-CPAP

## 2021-06-04 RX ORDER — METHADONE HYDROCHLORIDE 5 MG/1
5 TABLET ORAL EVERY 12 HOURS SCHEDULED
Status: DISCONTINUED | OUTPATIENT
Start: 2021-06-04 | End: 2021-06-04

## 2021-06-04 RX ORDER — HYDROCODONE BITARTRATE AND ACETAMINOPHEN 10; 325 MG/1; MG/1
1-2 TABLET ORAL EVERY 6 HOURS PRN
Status: DISCONTINUED | OUTPATIENT
Start: 2021-06-04 | End: 2021-06-05

## 2021-06-04 RX ORDER — CEFAZOLIN SODIUM 2 G/100ML
2 INJECTION, SOLUTION INTRAVENOUS
Status: COMPLETED | OUTPATIENT
Start: 2021-06-04 | End: 2021-06-07

## 2021-06-04 RX ORDER — METHADONE HYDROCHLORIDE 5 MG/1
5 TABLET ORAL 3 TIMES DAILY
Status: DISCONTINUED | OUTPATIENT
Start: 2021-06-04 | End: 2021-06-04

## 2021-06-04 RX ORDER — PIPERACILLIN SODIUM, TAZOBACTAM SODIUM 4; .5 G/20ML; G/20ML
4.5 INJECTION, POWDER, LYOPHILIZED, FOR SOLUTION INTRAVENOUS EVERY 6 HOURS
Status: DISCONTINUED | OUTPATIENT
Start: 2021-06-04 | End: 2021-06-06

## 2021-06-04 RX ORDER — METHADONE HYDROCHLORIDE 5 MG/1
5 TABLET ORAL 3 TIMES DAILY
Status: DISCONTINUED | OUTPATIENT
Start: 2021-06-04 | End: 2021-06-06

## 2021-06-04 RX ADMIN — ALPRAZOLAM 0.5 MG: 0.25 TABLET ORAL at 12:35

## 2021-06-04 RX ADMIN — HYDROCODONE BITARTRATE AND ACETAMINOPHEN 1 TABLET: 10; 325 TABLET ORAL at 14:43

## 2021-06-04 RX ADMIN — METHADONE HYDROCHLORIDE 5 MG: 5 TABLET ORAL at 19:59

## 2021-06-04 RX ADMIN — SODIUM CHLORIDE, POTASSIUM CHLORIDE, SODIUM LACTATE AND CALCIUM CHLORIDE: 600; 310; 30; 20 INJECTION, SOLUTION INTRAVENOUS at 08:53

## 2021-06-04 RX ADMIN — SODIUM CHLORIDE, POTASSIUM CHLORIDE, SODIUM LACTATE AND CALCIUM CHLORIDE: 600; 310; 30; 20 INJECTION, SOLUTION INTRAVENOUS at 04:15

## 2021-06-04 RX ADMIN — PIPERACILLIN SODIUM AND TAZOBACTAM SODIUM 4.5 G: 4; .5 INJECTION, POWDER, LYOPHILIZED, FOR SOLUTION INTRAVENOUS at 08:10

## 2021-06-04 RX ADMIN — OMEPRAZOLE 40 MG: 20 CAPSULE, DELAYED RELEASE ORAL at 08:16

## 2021-06-04 RX ADMIN — CITALOPRAM HYDROBROMIDE 40 MG: 20 TABLET ORAL at 08:16

## 2021-06-04 RX ADMIN — ALPRAZOLAM 0.5 MG: 0.25 TABLET ORAL at 23:34

## 2021-06-04 RX ADMIN — HYDROCODONE BITARTRATE AND ACETAMINOPHEN 1 TABLET: 10; 325 TABLET ORAL at 15:56

## 2021-06-04 RX ADMIN — MAGNESIUM OXIDE TAB 400 MG (241.3 MG ELEMENTAL MG) 400 MG: 400 (241.3 MG) TAB at 08:16

## 2021-06-04 RX ADMIN — PIPERACILLIN SODIUM AND TAZOBACTAM SODIUM 4.5 G: 4; .5 INJECTION, POWDER, LYOPHILIZED, FOR SOLUTION INTRAVENOUS at 13:20

## 2021-06-04 RX ADMIN — PIPERACILLIN SODIUM AND TAZOBACTAM SODIUM 4.5 G: 4; .5 INJECTION, POWDER, LYOPHILIZED, FOR SOLUTION INTRAVENOUS at 19:59

## 2021-06-04 RX ADMIN — ALPRAZOLAM 0.5 MG: 0.25 TABLET ORAL at 18:29

## 2021-06-04 RX ADMIN — MAGNESIUM OXIDE TAB 400 MG (241.3 MG ELEMENTAL MG) 400 MG: 400 (241.3 MG) TAB at 19:59

## 2021-06-04 RX ADMIN — MAGNESIUM OXIDE TAB 400 MG (241.3 MG ELEMENTAL MG) 400 MG: 400 (241.3 MG) TAB at 13:20

## 2021-06-04 RX ADMIN — ALPRAZOLAM 0.5 MG: 0.25 TABLET ORAL at 01:27

## 2021-06-04 RX ADMIN — HYDROCODONE BITARTRATE AND ACETAMINOPHEN 2 TABLET: 10; 325 TABLET ORAL at 21:56

## 2021-06-04 RX ADMIN — METOPROLOL SUCCINATE 50 MG: 50 TABLET, EXTENDED RELEASE ORAL at 08:17

## 2021-06-04 RX ADMIN — ONDANSETRON 4 MG: 2 INJECTION INTRAMUSCULAR; INTRAVENOUS at 05:41

## 2021-06-04 RX ADMIN — ROSUVASTATIN CALCIUM 40 MG: 20 TABLET, FILM COATED ORAL at 08:15

## 2021-06-04 RX ADMIN — ACETAMINOPHEN 650 MG: 325 TABLET, FILM COATED ORAL at 08:42

## 2021-06-04 RX ADMIN — SILVER SULFADIAZINE: 10 CREAM TOPICAL at 10:47

## 2021-06-04 RX ADMIN — DOCUSATE SODIUM 50 MG AND SENNOSIDES 8.6 MG 1 TABLET: 8.6; 5 TABLET, FILM COATED ORAL at 21:58

## 2021-06-04 RX ADMIN — METHADONE HYDROCHLORIDE 5 MG: 5 TABLET ORAL at 17:13

## 2021-06-04 ASSESSMENT — ACTIVITIES OF DAILY LIVING (ADL)
ADLS_ACUITY_SCORE: 15

## 2021-06-04 NOTE — PROGRESS NOTES
I rounded on the patient on 6/4/2021. Patient known to me from his H&N cancer history, currently undergoing treatment. Had event this morning that resulted in ICU admission, likely from respiratory suppression from narcotics. He was stable when I saw him this morning. We reviewed again that he has a very treatable cancer. He was encouraged to continue his treatment especially since he is almost done. We discussed that he will still need the PEG even though he is almost done with treatment. I explained that we expect the symptoms to continue after treatment but will get better with time. He was inquiring about a PICC with TPN instead, and I recommended that ultimately he does need the enteral nutrition. It sounds like the PEG will be rescheduled for Monday. I spoke with Dr Montemayor about the patient who agrees with the PEG plan. Updated Dr Robbins as well who will be continuing patient's treatment.    Ligia Fang MD    Department of Otolaryngology

## 2021-06-04 NOTE — PROGRESS NOTES
Spoke with patient's wife today. Patient in ICU currently, but she thinks he will be going to step down unit today. Things have been tough, but she feels like she is doing okay and has support she needs for herself and for patient.

## 2021-06-04 NOTE — PROGRESS NOTES
Responded to the code blue on the patient. Started bagging the patient as he was not able to maintain oxygen saturations by himself. He was spontaneously breathing throughout the code with saturations of % with 100% BVM. Narcan was given with the patient waking up. After patient started to arouse he vomitted copious amounts. He was then transported down to 4C on 25L oxymask and then placed on a HHFNC of 50L 100% with saturations in the mid 90s.     Slade Horton RRT

## 2021-06-04 NOTE — PROGRESS NOTES
Hematology / Oncology  Daily Progress Note   Date of Service: 06/04/2021  Patient: Quan Murphy  MRN: [id number]  Admission Date: 6/2/2021  Hospital Day # 2  Cancer Diagnosis: SCC of L tonsil  Primary Outpatient Oncologist: Dr. Schrader  Current Treatment Plan: Concurrent chemoradiation, completed weekly cisplatin     Summary & Recommendations:   - Recommend CT chest to further evaluate aspiration.   - Agree with consulting inpatient palliative team for their recommendations on pain regimen moving forward, given concern for narcotic/BZD-related respiratory depression. Patient follows with palliative outpatient.  - Proceed with PEG placement when able.  - After discussion with Dr. Schrader, patient will not make up last dose of cisplatin he missed. He has completed weekly cisplatin. Continue radiation per [facility].    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 He was seen in clinic on 6/2/2021 and was found to be doing extremely poorly with weakness, severely limited PO intake as well as difficulty managing secretions. His last cisplatin was on 5/27. He had been referred but that had not been scheduled yet for a PEG as outpatient so he was admitted for poor PO intake, failure to thrive and placement of feeding tube. In early morning 6/4, patient suffered respiratory arrest secondary to aspiration vs narcotic respiratory depression, now improved.      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  PET/CT showed 2.7 x 2.2 x 2.7 cm L palatine tonsil mass (SUV 24), extension to oral cavity, 3.4 x 2.0  cm L level 4 node invading SCM, 1.8 x 1.8 cm L level 2A/3 node (SUV 7.9) w/ECS, 1.4 x 0.9 cm R level 2A node (SUV 6.7). He began concurrent chemoradiation with weekly cisplatin on 4/28. He presented for his clinic visit on 6/2/2021 with very limited PO intake and continued weight loss and failure to thrive. He was admitted to the hospital to get IV fluids and undergo PEG tube placement for initiation of tube feeding.  - Continue XRT while inpatient.  - Patient missed last dose of weekly cisplatin due to admission. After discussion with Dr. Schrader, there is no need to make up this dose. He has therefore completed his chemotherapy.   - PEG placement and TF initiation when able. Monitor for refeeding syndrome. Appreciate RD input.   - IVF and electrolyte replacement per primary team.   - PT/OT consult.  - Patient follows with Dr. Serrano with palliative outpatient.     #Pancytopenia  Likely secondary to chemotherapy and/or chronic diease.   - Monitor CBC  - Patient is not nearing transfusion parameters    #Recent h/o respiratory arrest  #AHRF, improving  #Aspiration vs iatrogenic respiratory depression   In early morning 6/4, patient suffered respiratory arrest. Code status DNR/DNI. Spontaneous return of breathing after respirations with BVM and Narcan administration. Suspect etiology of aspiration vs narcotic respiratory depression. Patient now on low flow nasal cannula.   - Management per ICU/medicine teams.   - Recommend CT chest for further evaluation of aspiration.  - Agree with consulting inpatient palliative team for their recommendations on pain regimen moving forward, given concern for narcotic/BZD-related respiratory depression.     Patient was seen and plan of care was discussed with attending physician Dr. Reece.    Thank you for the opportunity to partake in this patient's plan of care. Please do not hesitate to page with questions. We will continue to follow.     Brittni Hill PA-C  "  Hematology/Oncology   Pager: [phone]  ___________________________________________________________________    Subjective & Interval History:    In early morning, patient suffered respiratory arrest. Code status DNR/DNI. Code blue called. Spontaneous return of breathing after respirations with BVM and Narcan administration. O2 needs have been since improving.     Patient is very alarmed this morning after last night's events. He felt that he almost . He has no shortness of breath at this time. He is starting to have more recurring abdominal pain. He wants to find the \"[place]\" of pain regimens where is pain is well-controlled but he does not experience respiratory depression. I assured him that we want the same thing. He is anxious during conversation, and voices that he needs the Xanax at least four times a day. He takes his PRN pain meds consistently at home. Discuss oncologic plans/recommendations. Patient and wife voice understanding. Questions were answered at bedside.     Physical Exam:    Blood pressure (!) 154/90, pulse 74, temperature 102.1  F (38.9  C), temperature source Oral, resp. rate 11, weight 94.5 kg (208 lb 4.8 oz), SpO2 100 %.    General: sitting up in bed, no acute distress  HEENT: sclera anicteric, EOMI, MMM  Neck: supple, normal ROM, radiation dermatitis  CV: RRR, normal S1/S2, no m/r/g  Resp: CTAB, no wheezing/crackles, normal respiratory effort on nasal cannula  GI: soft, non-distended  MSK: warm and well-perfused, normal tone  Skin: no rashes on limited exam, no jaundice  Neuro: Alert and interactive, moves all extremities equally, no focal deficits    Labs & Studies: I personally reviewed the following studies:  ROUTINE LABS (Last four results):  CMP  Recent Labs   Lab 21  0624 21  2152 21  0806    137 139   POTASSIUM 4.6 3.7 4.1   CHLORIDE 102 103 101   CO2 31 31 30   ANIONGAP 4 3 7   * 93 100*   BUN 21 24 29   CR 1.29* 1.22 1.58*   GFRESTIMATED 56* 60* " 44*   GFRESTBLACK 65 69 51*   LATRELL 8.8 8.0* 8.5   MAG 1.8 1.5* 1.2*   PHOS  --  2.5  --    PROTTOTAL 6.0* 5.8* 6.3*   ALBUMIN 2.3* 2.4* 2.6*   BILITOTAL 0.4 0.5 0.6   ALKPHOS 60 49 54   AST 34 24 23   ALT 29 24 23     CBC  Recent Labs   Lab 06/04/21  0624 06/02/21  2152 06/02/21  0806   WBC 2.3* 1.8* 2.5*   RBC 3.60* 3.35* 3.62*   HGB 10.3* 9.6* 10.3*   HCT 32.6* 29.3* 31.4*   MCV 91 88 87   MCH 28.6 28.7 28.5   MCHC 31.6 32.8 32.8   RDW 15.0 14.6 14.4   * 88* 102*     INRNo lab results found in last 7 days.  Medications list for reference:  Current Facility-Administered Medications   Medication     acetaminophen (TYLENOL) tablet 650 mg     ALPRAZolam (XANAX) tablet 0.5 mg     ceFAZolin (ANCEF) intermittent infusion 2 g in 100 mL dextrose PRE-MIX     citalopram (celeXA) tablet 40 mg     [Held by provider] HYDROcodone-acetaminophen (NORCO)  MG per tablet 1-2 tablet     [Held by provider] HYDROmorphone (PF) (DILAUDID) injection 0.5 mg     hydrOXYzine (ATARAX) tablet 50 mg     [START ON 6/6/2021] iohexol (OMNIPAQUE) solution 50 mL     lidocaine (LMX4) cream     lidocaine 1 % 0.1-1 mL     magic mouthwash suspension (diphenhydrAMINE, lidocaine, aluminum-magnesium & simethicone)     magnesium oxide (MAG-OX) tablet 400 mg     May take regular AM medications except those listed below     melatonin tablet 3 mg     methadone (DOLOPHINE) tablet 5 mg     metoprolol succinate ER (TOPROL-XL) 24 hr tablet 50 mg     mineral oil-hydrophilic petrolatum (AQUAPHOR)     omeprazole (priLOSEC) CR capsule 40 mg     ondansetron (ZOFRAN-ODT) ODT tab 4 mg    Or     ondansetron (ZOFRAN) injection 4 mg     piperacillin-tazobactam (ZOSYN) 4.5 g vial to attach to  mL bag     polyethylene glycol (MIRALAX) Packet 17 g     rosuvastatin (CRESTOR) tablet 40 mg     senna-docusate (SENOKOT-S/PERICOLACE) 8.6-50 MG per tablet 1 tablet    Or     senna-docusate (SENOKOT-S/PERICOLACE) 8.6-50 MG per tablet 2 tablet     silver sulfADIAZINE  (SILVADENE) 1 % cream     sodium chloride (PF) 0.9% PF flush 3 mL     sodium chloride (PF) 0.9% PF flush 3 mL     sodium chloride 0.9 % bag TABLE SOLN

## 2021-06-04 NOTE — PROGRESS NOTES
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  He was seen in clinic on 6/2/2021 and was found to be doing extremely poorly with weakness, severely limited p.o. intake as well as difficulty managing secretions.  His last cisplatin was on 5/27.  He had been referred but that had not been scheduled yet for a PEG as outpatient so he was admitted for poor p.o. intake, failure to thrive and placement of feeding tube. Pt has been followed by OP ST caseload. Pt's PO intake has been very limited by odynophagia. Clinical swallow eval completed per MD orders to further assess oropharyngeal swalllow function.    Past History of Dysphagia Pt has been followed by OP SLP at ENT clinic. Previous VFSS completed pre chemoXRT on 4/27/21. Pt with intermittent penetration of thin liquids and a regular diet with thin liquids was recommended at that time. Since then, pt's PO intake has been significantly impacted by odynophagia. Pt has been mostly taking cold liquids and very soft solids. Reports 10lb weight loss and is currently admitted with plan for PEG.    Type of Evaluation   Type of Evaluation Swallow Evaluation   Oral Motor   Oral Musculature generally intact   Structural Abnormalities none present   Mucosal Quality adequate   Dentition (Oral Motor)   Dentition (Oral Motor) adequate dentition   Facial Symmetry (Oral Motor)   Facial Symmetry (Oral Motor) WNL   Lip Function (Oral Motor)   Lip Range of Motion (Oral Motor) WNL   Tongue Function (Oral Motor)   Tongue ROM (Oral Motor) WNL   Jaw Function  (Oral Motor)   Jaw Function (Oral Motor) range of motion impairment   Jaw Range of Motion Impairment minimal impairment   Cough/Swallow/Gag Reflex (Oral Motor)   Soft Palate/Velum (Oral Motor) WNL  (mild erythema )   Volitional Throat Clear/Cough (Oral Motor) WNL   Vocal Quality/Secretion Management (Oral Motor)   Vocal Quality (Oral Motor) WFL   General Swallowing Observations   Current Diet/Method of Nutritional Intake (General Swallowing Observations, NIS) NPO   Respiratory Support (General Swallowing Observations) nasal cannula   Swallowing Evaluation Clinical swallow evaluation   Clinical Swallow Evaluation   Feeding Assistance no assistance needed   Clinical Swallow Evaluation Textures Trialed Thin Liquids   Clinical Swallow Eval: Thin Liquid Texture Trial   Mode of Presentation, Thin Liquids straw;self-fed   Volume of Liquid or Food Presented  3oz   Oral Phase of Swallow WFL   Pharyngeal Phase of Swallow intact   Diagnostic Statement No overt s/sx of aspiration   Swallowing Recommendations   Diet Consistency Recommendations thin liquids;clear liquid diet;other (see comments)  (ADAT)   Supervision Level for Intake close supervision needed   Mode of Delivery Recommendations bolus size, small;food moistened;slow rate of intake   Swallowing Maneuver Recommendations alternate food and liquid intake;extra swallow   Monitoring/Assistance Required (Eating/Swallowing) stop eating activities when fatigue is present;monitor for cough or change in vocal quality with intake;optimize oral intake to minimize need for tube feeding;cue for finger/lingual sweep if oral pocketing present   Recommended Feeding/Eating Techniques (Swallow Eval) maintain upright sitting position for eating;moisten oral mucosa prior to intake;minimize distractions during oral intake;provide oral hygiene prior to intake;provide 6 smaller meals throughout day   Medication Administration Recommendations, Swallowing (SLP) as tolerated   General Therapy  Interventions   Planned Therapy Interventions Dysphagia Treatment   Dysphagia treatment Compensatory strategies for swallowing;Instruction of safe swallow strategies;Modified diet education;Oropharyngeal exercise training   SLP Therapy Assessment/Plan   Criteria for Skilled Therapeutic Interventions Met (SLP Eval) yes;treatment indicated   SLP Diagnosis mild-moderate oropharyngeal dysphagia    Rehab Potential (SLP Eval) fair, will monitor progress closely   Therapy Frequency (SLP Eval) 5 times/wk   Predicted Duration of Therapy Intervention (SLP Eval) 2 weeks   Comment, Therapy Assessment/Plan (SLP) Limited clinical swallow eval completed per MD orders. Pt presents with mild-moderate oropharyngeal dysphagia and odynophagia related to chemoXRT. Pt's oral musculature revealed mild erythema on soft palate and mildy reduced jaw ROM. Pt was only agreeable to small amount of liquid trials due to odynophagia. Pt tolerated thin liquids via straw with no overt s/sx of aspiration. Pt declined additional PO trials despite encouragement due to reduced pain management; RN aware. Recommend pt initiate clear liquids and ADAT. Pt should be fully upright and alert for all PO, take small sips/bites, slow pacing, and utilize extra/hard swallow strategy. ST to continue to follow targeting diet tolerance and oropharyngeal exercises. Pt will require OP ST follow up at ENT clinic upon discharge.    Therapy Plan Review/Discharge Plan (SLP)   Therapy Plan Review (SLP) evaluation/treatment results reviewed;care plan/treatment goals reviewed;risks/benefits reviewed;current/potential barriers reviewed;participants voiced agreement with care plan;participants included;patient;spouse/significant other   Demonstrates Need for Referral to Another Service (SLP) clinical nutrition services/dietitian;occupational therapist;physical therapist;respiratory therapist   SLP Discharge Planning    SLP Discharge Recommendation (DC Rec) home with outpatient  speech therapy   SLP Rationale for DC Rec pt benefits from ongoing ST targeting dysphagia management and oropharyngeal exercises during chemoXRT   SLP Brief overview of current status  Recommend pt initiate clear liquids and ADAT. Pt should be fully upright and alert for all PO, take small sips/bites, slow pacing, and utilize extra/hard swallow strategy.     Total Evaluation Time   Total Evaluation Time (Minutes) 14

## 2021-06-04 NOTE — PLAN OF CARE
Problem: Adult Inpatient Plan of Care  Goal: Optimal Comfort and Wellbeing  6/4/2021 0652 by Marjorie Ruvalcaba RN  Outcome: Declining     Pt noted to be making a loud sound as if snoring. Pt was found having periods of apnea and skin pale. Pulse present. Not responding to name or noise. Sternal rub done with no response from patient. Code was initiated. Code team arrived unit. Pt is No CPR and DNI. Ampu bag placed and 02 provided. Pt was given narcan and pt had a large amount of emesis. Zofran given IV. Pt is alert but not appropriate with questions. Pt was transported to . MD to update wife.

## 2021-06-04 NOTE — CONSULTS
[facility]  Palliative Care Consultation Note    Patient: Quan Murphy  Date of Admission:  6/2/2021    Requesting Clinician / Team: Hospital Medicine  Reason for consult: Pain management  Symptom management  Patient and family support    Recommendations\discussion:   Patient seen and examined.  Discussed with primary care hospital medicine provider.  Reviewed with outpatient palliative care provider.  Recommend ongoing methadone use at 5 mg every 8 hours. (Note home dose was 10 mg every 8 hours)  If ineffective would increase methadone slowly to 7.5 mg every 8 hours after 48-72 hours.  Do not make any adjustments after that period of time for at least 4 to 5 days.  Do not recommend any dosing frequency less than every 8 hours at this time.  Recommend scheduled Tylenol 650 mg every 6 hours. Could use oxycodone or hydromorphone tablets every 4 hours as needed for breakthrough pain.  Recommend outpatient follow-up with Dr. Serrano after discharge.  Would avoid anxiolytics at present time.  Plan is for patient to remain in hospital until feeding tube placed 6/7/2021.      These recommendations have been discussed with patient, pt family, bedside RN and primary MD.      Thank you for the opportunity to participate in the care of this patient and family. Our team: will continue to follow.     During regular M-F work hours -- if you are not sure who specifically to contact -- please contact us by sending a text page to our team consult pager at [phone].    After regular work hours and on weekends/holidays, you can call our answering service at [phone]. Also, who's on call for us is available in [employer].   David VIDAL NP  Nurse Practitioner- Lead Advanced Practice Provider  [facility] Palliative Medicine Consult Service   [phone]  TT spent: 40 minutes of which 30 minutes were spent in direct face to face contact with patient/family. Greater  than 50% of time spent counseling and/or coordinating care.     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 Following admission had what appeared to be aspiration/ respiratory suppression event (query if related to sedation from pain meds) and led to ICU admission. He is followed by Dr Schrader and is receiving chemo/rad for cancer directed therapies. Last saw Dr Serrano in PC clinic at the end of May.   Scheduled for IR guided G tube 6/7.     Today, the patient was seen for #Stage II squamous cell carcinoma of his left tonsil, currently undergoing concurrent chemoradiation- pain management.   #Pancytopenia  #Poor p.o. intake  #Failure to thrive    Prognosis, Goals, & Planning:      Functional Status just prior to hospitalization: 1 (Restricted in physically strenuous activity but ambulatory and able to carry out work of a light or sedentary nature)      Prognosis, Goals, and/or Advance Care Planning were addressed today: Yes        Summary/Comments: Patient has been told he has a potentially curative cancer.  He is interested in ongoing cares.  Can go off on tangents during discussion but otherwise seems alert and aware of present problems.      Patient's decision making preferences: independently          Patient has decision-making capacity today for complex decisions: Yes            I have concerns about the patient/family's health literacy today: No           Patient has a completed Health Care Directive: Yes, and on file.      Code status: No CPR / No Intubation    Coping, Meaning, & Spirituality:   Mood, coping, and/or  meaning in the context of serious illness were addressed today: Yes  Summary/Comments: Is aware his cancer is potentially highly curative.  Trying to get through adverse effects of current treatments.    Social: Social - Lives with wife. 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  2 adult sons 1 of whom lives in [place] [place] [facility] [place] [facility] the other [facility].  He has 5 grandchildren.    History of Present Illness:  History gathered today from: patient, family/loved ones, medical chart, medical team members, unit team members  As outpatient, were working on getting PEG placed via thoracic surgery, however has not happened yet so admitted to expedite care given his rapid decline.  His decline included weight loss, weakness, general malaise and ongoing complaints of pain both integument from radiation burn and mucositis in the back of the throat.  -pain medications as above    Key Palliative Symptom Data:  # Pain severity the last 12 hours: low  # Dyspnea severity the last 12 hours: low  # Anxiety severity the last 12 hours: low    Patient is on opioids: assessed and bowels ok/no needed changes to plan of care today.    ROS:  Comprehensive ROS is reviewed and is negative except as here & per HPI:      Past Medical History:  Past Medical History:   Diagnosis Date     Allergy, unspecified not elsewhere classified     Seasonal allergies, pollen, dust, smoke and animals     Antiplatelet or antithrombotic long-term use      Anxiety      Arthritis      Chest pain      Chronic sinusitis      Coronary atherosclerosis of unspecified type of vessel, native or graft     Coronary artery disease     Hyperlipidemia      Hypertension      Inguinal hernia      Kidney stones      Malignant neoplasm of prostate (H)     Prostate cancer     Prostate cancer (H)         Past Surgical History:  Past Surgical History:   Procedure Laterality Date     ARTHRODESIS FOOT  7/23/2013     Procedure: ARTHRODESIS FOOT;  Great Toe Arthrodesis Left Foot;  Surgeon: Ash Gonzalez DPM;  Location: [facility] OR     ARTHRODESIS FOOT  6/10/2014    Procedure: ARTHRODESIS FOOT;  Surgeon: Ash Gonzalez DPM;  Location: [facility] OR     C LAPAROSCOPY, SURGICAL PROSTATECTOMY, RETROPUBIC RADICAL, W/NERVE SPARING  11/30/2004    With full bilateral pelvic lymphadenectomy.  F-[facility].     C TOTAL KNEE ARTHROPLASTY  05/01/08    Left knee     COLONOSCOPY  10/7/2013    Procedure: COLONOSCOPY;  Colonoscopy;  Surgeon: Mike Fallon MD;  Location: [facility]     ESOPHAGOSCOPY, GASTROSCOPY, DUODENOSCOPY (EGD), COMBINED N/A 2/12/2020    Procedure: ESOPHAGOGASTRODUODENOSCOPY (EGD);  Surgeon: Sam Escobar MD;  Location: [facility]     EXTRACORPOREAL SHOCK WAVE LITHOTRIPSY (ESWL) Bilateral 10/18/2017    Procedure: EXTRACORPOREAL SHOCK WAVE LITHOTRIPSY (ESWL);  BILATERAL EXTRACORPOREAL SHOCKWAVE LITHOTRIPSY ;  Surgeon: Meir Torres MD;  Location: [facility] OR     HC CORRECT BUNION,SIMPLE  08/11/2005    x3     HC REMV TOE BENIGN BONE LESN  08/11/2005     HERNIORRHAPHY INGUINAL  7/3/2013    Procedure: HERNIORRHAPHY INGUINAL;  Open Repair Inguinal hernia Right with mesh ;  Surgeon: Sam Escobar MD;  Location: [facility] OR     MOHS MICROGRAPHIC PROCEDURE  08/23/11    ear and chin-[facility]     OPEN REDUCTION INTERNAL FIXATION WRIST Right 7/18/2017    Procedure: OPEN REDUCTION INTERNAL FIXATION WRIST;  Right distal radius open reduction and internal fixation;  Surgeon: Pedro Blanca DO;  Location: [facility]     RECONSTRUCT FOREFOOT WITH METATARSOPHALANGEAL (MTP) FUSION  6/10/2014    Procedure: RECONSTRUCT FOREFOOT WITH METATARSOPHALANGEAL (MTP) FUSION;  Surgeon: Ash Gonzalez DPM;  Location: [facility] OR     STENT, CORONARY, DEMI       SURGICAL HISTORY OF -   1999/1974    lt knee     SURGICAL HISTORY OF -   10/2004    lithotripsy     SURGICAL HISTORY OF - 11/05    angiogram with stent         Family History:  Family  History   Problem Relation Age of Onset     Hypertension Father         has had MI      Connective Tissue Disorder Mother         LUPUS     Heart Disease Mother         poor valve-needing replacement        Allergies:  Allergies   Allergen Reactions     Animal Dander      Azithromycin Nausea and Vomiting     Dust Mites      Pollen Extract      Smoke.         Medications:  I have reviewed this patient's medication profile and medications from this hospitalization.     Physical Exam:  Vital Signs: Temp: 102.1  F (38.9  C) Temp src: Oral BP: (!) 154/90 Pulse: 74   Resp: 11 SpO2: 100 % O2 Device: Nasal cannula Oxygen Delivery: 3 LPM  Weight: 208 lbs 4.8 oz   General Appearance:  Uncomfortable, sitting up in ICU bed.  Loquacious.  HEENT: Symmetric features. Oropharynx with intact dentition.  Mucositis in the posterior pharynx.  No active bleeding.  NC/AT.  EOMI   respiratory: clear to auscultation anteriorly and bilaterally without wheeze or crackles.  Good air entry   Cardiovascular: S1-S2 without murmur.  Pulses are regular.  Monitor with sinus regular rhythm.  GI: soft, non-distended, non-distended.  No hepatosplenomegaly or mass.  Brief exam.  Skin: widespread thoracic radiation dermatitis with 1x4 cm open area over the left clavicular head  Other: Note radiation mucositis in oral cavity    Data reviewed:  ROUTINE IP LABS (Last four results)  BMP  Recent Labs   Lab 06/04/21 0624 06/02/21 2152 06/02/21  0806    137 139   POTASSIUM 4.6 3.7 4.1   CHLORIDE 102 103 101   LATRELL 8.8 8.0* 8.5   CO2 31 31 30   BUN 21 24 29   CR 1.29* 1.22 1.58*   * 93 100*     CBC  Recent Labs   Lab 06/04/21 0624 06/02/21 2152 06/02/21  0806   WBC 2.3* 1.8* 2.5*   RBC 3.60* 3.35* 3.62*   HGB 10.3* 9.6* 10.3*   HCT 32.6* 29.3* 31.4*   MCV 91 88 87   MCH 28.6 28.7 28.5   MCHC 31.6 32.8 32.8   RDW 15.0 14.6 14.4   * 88* 102*     INRNo lab results found in last 7 days.

## 2021-06-04 NOTE — PROGRESS NOTES
IR follow-up note.    Pt was on IR schedule 6/4 for G tube placement. Given events early this AM and current patient condition (still on O2, likely recent aspiration event), IR will defer G tube placement until 6/7. Contrast will need to be given 6/6 at 2000. This will be sent to the bedside by IR RN charge.    Discussed with Dr. Campbell from IR and Dr. Montemayor.    Alycia Chowdhury DNP, APRN  Interventional Radiology   IR on-call pager: [phone]     Warm

## 2021-06-04 NOTE — PLAN OF CARE
Admitted/transferred from: 7D  Reason for admit/transfer: Code Blue/Unresponsive  Patient status upon admit/transfer: Alert and confused,   Interventions: HiFlo oxygen, EKG, Chest X-Ray, ABG, labs  Plan: Assess labs and determine if patient will have PEG Tube placement today.   2 RN assessment: completed by Gretta Tee RN, Riana Espinoza RN  Result of skin assessment and interventions/actions: WDL- Painful rash left lower/middle (reported from previous RN it is from radiation.  Height/Weight, drug cacm/94.5kg  Patient belongings: At bedside  MDRO education: Not applicable    Took over patient care at 0615 AM. A&O to person. Aspiration precautions. NPO for possible Peg tube placement today.

## 2021-06-04 NOTE — PROGRESS NOTES
"Care Management Follow Up    Length of Stay (days): 2    Expected Discharge Date: 06/09/21     Concerns to be Addressed: Discharge Planning     Patient plan of care discussed at interdisciplinary rounds: Yes    Anticipated Discharge Disposition: TBD   Anticipated Discharge Services: TBD  Anticipated Discharge DME: N/A    Patient/family educated on Medicare website which has current facility and service quality ratings: N/A  Education Provided on the Discharge Plan: N/A  Patient/Family in Agreement with the Plan: N/A    Referrals Placed by CM/SW: [facility]   Private pay costs discussed: Not applicable    Additional Information:    Writer received notification from [employer] [facility] [email or web address] regarding enteral nutrition coverage.     Per [facility], \"Patient Quan Murphy does not meet Medicare criteria for enteral TF, he is self-pay. Cost for Jevity 7 cans daily is $28.88 per day for formula and supplies, if it s on a pump there is also a $75.63 monthly charge. Nursing is covered of he is medically homebound if not [facility] charges $90.00 per visit.     Per our nutrition team in regards to Medicare criteria this is what they said:  Does not meet criteria currently, does not yet have tube and as of yesterday was drinking oral supplements. If tube is placed, would also need documentation of need for > 90 days\".    Per chart review, due to patient's status, IR is deferring G-tube placement until 6/7.    Eve Oshea, RN, BSN, PHN  Care Coordinator   P: [phone], [facility]               "

## 2021-06-04 NOTE — PLAN OF CARE
4C PT: Cancel; PT consult acknowledged and appreciated. Per RN, pt not appropriate for PT evaluation this AM 2/2 recent code, busy with other providers in PM. Will reschedule PT evaluation.

## 2021-06-04 NOTE — SIGNIFICANT EVENT
Significant Event Note    Time of event: 5:47 AM June 4, 2021    Description of event:  Code blue called for respiratory arrest. Per nursing staff, patient had been awake 40-45 minutes prior to the event and was alert, oriented x3, and able to change into his pajamas/night clothes. On my evaluation, code team was present and administering rescue breaths using bag-valve mask ventilation. Patient's code status was confirmed to be DNR/DNI. Per medication review, patient had received Methadone as well as PRN Oxycodone and 1 dose of IV Dilaudid for severe pain. Narcan 0.04 mg IV was administered x1 with improvement in respiratory status and mental status. Patient experienced several episodes of emesis after Narcan was administered but he was able to protect his airway. He was transferred to  for closer respiratory monitoring.    Plan:  70 year old male with a PMH notable for SCC of left tonsil c/b limited oral intake/ongoign weight loss who was admitted with severe malnutrition with need for feeding tube placement, with hospital course c/b acute respiratory failure with improvement s/p Narcan.    Patient received 10 mg TID of Methadone yesterday along with 4 does of PRN Percocet and 1 PRN of IV Dilaudid. However, last dose of narcotic was at 2230 so the time course is slightly unusual, though concomitant administration of Xanax may have contributed. That being said, his symptomatic improvement after administration of Narcan is otherwise difficult to explain.    Will start with broad work-up for cardiac, respiratory, and infectious causes. Remain on 4C given concern that the may need another dose of Narcan given short half-life of the medication.    - Check EKG, CXR   - CBC, CMP, lactate, troponin  - Patient's code status is DNR/DNI  - Family (wife) updated on transfer from hospital medicine floor  - Plan discussed with ICU team, who will admit the patient to their service for closer monitoring    Morgan Hicks,  MD  [facility] medicine Attending  06/04/21 at 6:12 AM

## 2021-06-04 NOTE — PLAN OF CARE
Problem: Adult Inpatient Plan of Care  Goal: Optimal Comfort and Wellbeing  Outcome: Improving     Problem: Oral Intake Inadequate  Goal: Improved Oral Intake  Outcome: Change based on patient need/priority     Pt ate 10% of food for supper. Pt stated that he has no taste for food but drank cold carbonated liquids. Pt was given ginger ale and ice water. Contrast mixed with 600 ml of water given to patient drank without problems. Pt was given scheduled methadone for pain and PRN oxy and dilaudid for neck and throat pain. Pt is now NPO at midnight for tube placement in the morning. Skin scabbed and dried to neck and chest area. Cream applied. BP (!) 152/65 (BP Location: Right arm)   Pulse 78   Temp 98.1  F (36.7  C) (Oral)   Resp 18   Wt 94.5 kg (208 lb 4.8 oz)   SpO2 93%. Pt is not compliant with 02. Pt noted to be anxious tonight, fidgeting and restless. Pt stated anxiety and xanax 0.5 g PO given for anxiety. Pt is resting at this time. IV fluid LR infusing at 100 ml/hr. Uses urinal and occasionally uses the bathroom. SBA with cares. Pt will be monitored through the night.

## 2021-06-04 NOTE — PROGRESS NOTES
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    # Respiratory arrest due to opioids  # Likely aspiration pneumonitis    Patient developed respiratory arrest early this morning, many hours after receiving opioids and benzodiazepines, but also suspect a degree of accumulation of methadone.  Patient initially on HFNC but weaned to NC oxygen now.    - continue to monitor closely  - oxygen as needed  - continue piperacillin/tazobactam, consider stopping if no clear evidence of pneumonia  - monitor opioid use carefully, plan as below   - follow sputum culture    #SCC of the left tonsil (cT2, cN2, cM0)  # severe malnutrition  #Failure to thrive  #Nausea  #Hypomagnesemia    Patient has had 10lb weight loss, very poor oral intake and difficulty taking medications.  Patient strongly doesn't want NJ tube and is agreeable to a PEG tube.  Will delay until Monday given events of today.    - appreciate IR and oncology assistance  - nutrition consult, will monitor for RFS after TF start  - monitor intake, but will hold off on TPN for now  - home care referral  -  Omeprazole  - NPO after MN Sunday night  - appreciate assistance of Dr. Fang    # Radiation burns: continue silvadene, wound care consult    # acute kidney injury, resolved  Patient with increased creatinine to 1.58 in setting of poor oral intake, now improved.     -continue fluids with poor intake  - follow BMP    #Chronic pain    Patient follows with Dr. Reza of palliative care.  At  admission was on  methadone 10mg TID with up to 10 maximum  norco.  Last night was given dilaudid 0.5 mg IV x1 for pain with drinking contrast and had arrest many hours later.    - change methadone to 5 mg TID  - continue norco 1-2 tablets q6h PRN, increase if needed, will try to consider switching off combination medication tomorrow.   - stop IV dilaudid  - palliative care assistance appreciated      #HLD  -continue rosuvastatin     #mucositis  -continue magic mouthwash, baking soda mouth rinses PRN    #Anxiety/depression  -continue atarax PRN   -continue celexa  -continue xanax PRN  -consider palliative care consult as above    Goals of Care  This cancer is likely curable and will continue restorative goals.              Diet: NPO per Anesthesia Guidelines for Procedure/Surgery Except for: Meds  Regular Diet Adult    DVT Prophylaxis: Pneumatic Compression Devices  Stone Catheter: not present  Code Status: No CPR- Do NOT Intubate           Disposition Plan   Expected discharge: 2 - 3 days, recommended to prior living arrangement once TF started.  Entered: Keyur Montemayor MD 06/04/2021, 4:44 PM       The patient's care was discussed with the Patient, Patient's Family and IR Consultant.    Keyur Montemayor MD  Hospitalist Service, [address]  Contact information available via [facility] Paging/Directory  Please see sign in/sign out for up to date coverage information  ______________________________________________________________________    Interval History      Respiratory arrest early this morning with improvement with narcan; had received additional opioids last night for drinking contrast.  Feels ok this morning, a bit confused about last night's events.  Pain well controlled.     4 point review of systems otherwise negative.      Data reviewed today: I reviewed all medications, new labs and imaging results over the last 24 hours. I personally reviewed  no images or EKG's today.    Physical Exam   Vital Signs: Temp: 98.2  F (36.8  C) Temp src: Oral BP: 133/87 Pulse: 77   Resp: 15 SpO2: 95 % O2 Device: Nasal cannula Oxygen Delivery: 1 LPM  Weight: 208 lbs 4.8 oz  General Appearance: Sitting in bed, comfortable  Respiratory: mildly increased work of breathing, basilar coarse crackles.   Cardiovascular: normal rate, regular rhythm.   GI: soft, non-distended, non-distended.  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     Data   Recent Labs   Lab 06/04/21  0624 06/02/21  2152 06/02/21  0806   WBC 2.3* 1.8* 2.5*   HGB 10.3* 9.6* 10.3*   MCV 91 88 87   * 88* 102*    137 139   POTASSIUM 4.6 3.7 4.1   CHLORIDE 102 103 101   CO2 31 31 30   BUN 21 24 29   CR 1.29* 1.22 1.58*   ANIONGAP 4 3 7   LATRELL 8.8 8.0* 8.5   * 93 100*   ALBUMIN 2.3* 2.4* 2.6*   PROTTOTAL 6.0* 5.8* 6.3*   BILITOTAL 0.4 0.5 0.6   ALKPHOS 60 49 54   ALT 29 24 23   AST 34 24 23   TROPI 0.035  --   --

## 2021-06-04 NOTE — H&P
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 Initially admitted for FTT, and briefly on the MICU service after episode of decreased responsiveness (possibly from benzos and opiate PRNs), and emesis overnight. Weaning down on O2 needs, protecting airway and mental status at baseline.    Today:  - stabilized mental status  - hypoxemia improving, now on NC  - treating sepsis due to aspiration pneumonia with Zosyn  - holding narcotics  - transfer to the floor    PLAN:  #Acute encephalopathy likely 2/2 polypharmacy (possible opiate overdose), resolved  Acutely became unresponsive early in the AM on 6/4 and \"Code Blue\" called. Responded well to Narcan x 1 during that time. Does chronically receive narcotics, including methadone, as an outpatient. Oddly, his unresponsive episodes occurred over 6+ hours after his last dose of opiates. He did receive Xanax early in the morning so possible there was interaction with all of these sedating medications, and with Narcan some of this sedation was acutely lifted. Mental status now back to baseline.  - hold opiates for now as below    #Chronic pain  Patient follows with Dr. Reza of palliative care. Currently on, per patient, methadone 10mg TID with up to 10 maximum  norco. Would like to discuss changing his methadone to 5mg Q4H with palliative care during his stay if possible.  - holding methadone, Norco, Dilaudid after MICU admission   - will likely need methadone to continue but could consider lower dose today (defer to medicine)  -consider palliative care consult    #Anxiety/depression  -continue atarax PRN " (has been helpful per pt; would like to try increased dose to 50mg)  -continue celexa  -continue xanax PRN   - will defer to accepting medicine team re: whether to discontinue this  -consider palliative care consult as above    Pulmonary:  #Acute hypoxemic respiratory failure, improving  Acute episode of AMS and emesis in the early AM on 6/4 and subsequently developed hypoxemia. Bag-masked when respiratory code called and successfully switched to high-flow O2. Quickly weaning down on supplemental O2 (currently on 3LPM). Because of fever and concern for aspiration, started on broad-spectrum aspiration pneumonia coverage.  - wean O2 as needed, to keep sats > 92%  - ABx as below    Cardiovascular:    #HLD  -continue rosuvastatin    GI/Nutrition:    #Emesis  Could also be secondary to polypharmacy or narcotic withdrawal after Narcan. Seems to be stable at this time.  - Zofran PRN  - PPI    #Severe malnutrition  #Failure to thrive  #Nausea  Planning to do percutaneous feeding tube on the floor due to poor PO intake, impaired swallowing function, and intolerance of NJ tube. Recent 10 lb weight loss, very poor oral intake and difficulty taking medications.  Patient strongly doesn't want NJ tube, will pursue PEG.  - IR consulted to place feeding tube; initially scheduled for today   - IR will call medicine team re: scheduling of tube placement   - NPO    Renal/Fluids/Electrolytes:  #?SURESH on CKD2 vs progressive CKD, improving  Patient with increased creatinine to 1.58 in setting of poor oral intake, now slowly improving. Did receive fluids, so this is also consistent with pre-renal disease.  - follow BMP  - discontinued maintenance LR to avoid volume overload given hypoxia    #Hypomagnesemia  Goal Mg of 2.  - trend daily    Endocrine:  No active issues.     ID:   #Sepsis due to likely aspiration pneumonia  Witnessed emesis around the time of desaturations on 6/4. Then spiked fever to 102.1F later in the morning. WBC low at  2.3 this morning. He meets sepsis criteria. CXR does not show any significant infiltrates.  - start Zosyn  - vanc started this morning   - can discontinue pending MRSA swab  - blood and sputum cultures    Hematology:    #SCC of the left tonsil (cT2, cN2, cM0)  Recently diagnosed (March 2021) and receiving chemotherapy with cisplatin and radiation.    #Leukopenia  Likely 2/2 chemo versus sepsis/infection (though has been going on for only the last few days and not when cisplatin was started). Not neutropenic.  - trend WBC    Musculoskeletal/Skin:  #Mucositis  -continue magic mouthwash, baking soda mouth rinses PRN    General Cares/Prophylaxis:    DVT Prophylaxis: mechanical, encourage ambulation  GI Prophylaxis: PPI  Restraints: none    Lines/tubes/drains:  - PIV x 1    Disposition:  - Transferring to the floor, intermediate floor    Staffed with Dr. 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 Early in the morning on 6/4, a rapid response was called around 5:47 AM for respiratory arrest. Per the overnight/rapid notes he became unresponsive after ambulating and being oriented (last seen normal less than an hour before the event). Was bag-masked and received Narcan x 1. He had several episodes of emesis after Narcan but was able to protect his airway. Code status confirmed to be DNR/DNI. Notably receives methadone scheduled, Norco PRN (received 20 mg x 4 on 6/3 and Dilaudid 0.5 mg x 1, but no doses just before this episode), and Xanax (at ~1 AM).    He was brought to the  "MICU and developed a fever. Saturating normally on high-flow oxygen. Upon my interactions with him several hours after the event, he was responding appropriately to my questions. He endorses anxiety and discomfort from the emesis and says he \"had a horrible night.\" Not having any shortness of breath, chest pain or pain with urination. He is asking for sips of water.    REVIEW OF SYSTEMS: 10-point ROS otherwise negative.    PAST MEDICAL HISTORY:   Past Medical History:   Diagnosis Date     Allergy, unspecified not elsewhere classified     Seasonal allergies, pollen, dust, smoke and animals     Antiplatelet or antithrombotic long-term use      Anxiety      Arthritis      Chest pain      Chronic sinusitis      Coronary atherosclerosis of unspecified type of vessel, native or graft     Coronary artery disease     Hyperlipidemia      Hypertension      Inguinal hernia      Kidney stones      Malignant neoplasm of prostate (H)     Prostate cancer     Prostate cancer (H)      SURGICAL HISTORY:  Past Surgical History:   Procedure Laterality Date     ARTHRODESIS FOOT  7/23/2013    Procedure: ARTHRODESIS FOOT;  Great Toe Arthrodesis Left Foot;  Surgeon: Ash Gonzalez DPM;  Location: [facility] OR     ARTHRODESIS FOOT  6/10/2014    Procedure: ARTHRODESIS FOOT;  Surgeon: Ash Gonzalez DPM;  Location:  OR     C LAPAROSCOPY, SURGICAL PROSTATECTOMY, RETROPUBIC RADICAL, W/NERVE SPARING  11/30/2004    With full bilateral pelvic lymphadenectomy.  F[facility].     C TOTAL KNEE ARTHROPLASTY  05/01/08    Left knee     COLONOSCOPY  10/7/2013    Procedure: COLONOSCOPY;  Colonoscopy;  Surgeon: Mike Fallon MD;  Location:  GI     ESOPHAGOSCOPY, GASTROSCOPY, DUODENOSCOPY (EGD), COMBINED N/A 2/12/2020    Procedure: ESOPHAGOGASTRODUODENOSCOPY (EGD);  Surgeon: Sam Escobar MD;  Location:  GI     EXTRACORPOREAL SHOCK WAVE LITHOTRIPSY (ESWL) Bilateral 10/18/2017    Procedure: EXTRACORPOREAL SHOCK WAVE LITHOTRIPSY (ESWL);  BILATERAL " EXTRACORPOREAL SHOCKWAVE LITHOTRIPSY ;  Surgeon: Meir Torres MD;  Location: [facility] OR     HC CORRECT BUNION,SIMPLE  08/11/2005    x3     HC REMV TOE BENIGN BONE LESN  08/11/2005     HERNIORRHAPHY INGUINAL  7/3/2013    Procedure: HERNIORRHAPHY INGUINAL;  Open Repair Inguinal hernia Right with mesh ;  Surgeon: Sam Escobar MD;  Location: [facility] OR     MOHS MICROGRAPHIC PROCEDURE  08/23/11    ear and chin-[facility]     OPEN REDUCTION INTERNAL FIXATION WRIST Right 7/18/2017    Procedure: OPEN REDUCTION INTERNAL FIXATION WRIST;  Right distal radius open reduction and internal fixation;  Surgeon: Pedro Blanca DO;  Location: [facility] OR     RECONSTRUCT FOREFOOT WITH METATARSOPHALANGEAL (MTP) FUSION  6/10/2014    Procedure: RECONSTRUCT FOREFOOT WITH METATARSOPHALANGEAL (MTP) FUSION;  Surgeon: Ash Gonzalez DPM;  Location: [facility] OR     STENT, CORONARY, DEMI       SURGICAL HISTORY OF -   1999/1974    lt knee     SURGICAL HISTORY OF -   10/2004    lithotripsy     SURGICAL HISTORY OF - 11/05    angiogram with stent     SOCIAL HISTORY:  Social History     Socioeconomic History     Marital status:      Spouse name: Alessandra     Number of children: 2     Years of education: None     Highest education level: None   Occupational History     Occupation: Retired   Social Needs     Financial resource strain: None     Food insecurity     Worry: None     Inability: None     Transportation needs     Medical: None     Non-medical: None   Tobacco Use     Smoking status: Never Smoker     Smokeless tobacco: Never Used   Substance and Sexual Activity     Alcohol use: Yes     Alcohol/week: 8.3 standard drinks     Types: 10 Cans of beer per week     Comment: 2 beers a day      Drug use: No     Sexual activity: Yes     Partners: Female   Lifestyle     Physical activity     Days per week: None     Minutes per session: None     Stress: None   Relationships     Social connections     Talks on phone: None     Gets  together: None     Attends Tenriism service: None     Active member of club or organization: None     Attends meetings of clubs or organizations: None     Relationship status: None     Intimate partner violence     Fear of current or ex partner: None     Emotionally abused: None     Physically abused: None     Forced sexual activity: None   Other Topics Concern      Service Not Asked     Blood Transfusions Not Asked     Caffeine Concern Not Asked     Occupational Exposure Not Asked     Hobby Hazards Not Asked     Sleep Concern Not Asked     Stress Concern Not Asked     Weight Concern Not Asked     Special Diet Not Asked     Back Care Not Asked     Exercise Not Asked     Bike Helmet Not Asked     Seat Belt Not Asked     Self-Exams Not Asked     Parent/sibling w/ CABG, MI or angioplasty before 65F 55M? Not Asked   Social History Narrative     None     FAMILY HISTORY:   Family History   Problem Relation Age of Onset     Hypertension Father         has had MI      Connective Tissue Disorder Mother         LUPUS     Heart Disease Mother         poor valve-needing replacement     ALLERGIES:   Allergies   Allergen Reactions     Animal Dander      Azithromycin Nausea and Vomiting     Dust Mites      Pollen Extract      Smoke.      MEDICATIONS:  Medications reviewed by me.    PHYSICAL EXAMINATION:  Temp:  [95.8  F (35.4  C)-98.1  F (36.7  C)] 98.1  F (36.7  C)  Pulse:  [65-81] 81  Resp:  [18-19] 18  BP: (124-152)/(65-77) 138/68  SpO2:  [93 %-94 %] 94 %     General: sitting up in bed, in mild distress  HEENT: neck supple, normocephalic. Skin changes in the L neck/upper chest from radiation exposure.  Neuro: fully alert and oriented  Pulm/Resp: Clear breath sounds bilaterally without rhonchi, crackles or wheeze,  breathing non-labored  CV: RRR without murmurs or rubs  Abdomen: Soft, non-distended, non-tender  Incisions/Skin: see above re: neck skin changes    LABS: Reviewed.   Arterial Blood Gases   No lab results  found in last 7 days.  Complete Blood Count   Recent Labs   Lab 06/02/21  2152 06/02/21  0806   WBC 1.8* 2.5*   HGB 9.6* 10.3*   PLT 88* 102*     Basic Metabolic Panel  Recent Labs   Lab 06/02/21  2152 06/02/21  0806    139   POTASSIUM 3.7 4.1   CHLORIDE 103 101   CO2 31 30   BUN 24 29   CR 1.22 1.58*   GLC 93 100*     Liver Function Tests  Recent Labs   Lab 06/02/21 2152 06/02/21  0806   AST 24 23   ALT 24 23   ALKPHOS 49 54   BILITOTAL 0.5 0.6   ALBUMIN 2.4* 2.6*     Pancreatic Enzymes  No lab results found in last 7 days.     Coagulation Profile  No lab results found in last 7 days.    IMAGING:  No results found for this or any previous visit (from the past 24 hour(s)).

## 2021-06-04 NOTE — PLAN OF CARE
ICU End of Shift Summary. See flowsheets for vital signs and detailed assessment.    Changes this shift:  Temperature high 102.1 (o), Tylenol given, MD notified and blood cultures & sputum cultures sent; temperature low 98.2(o). Weaned off HFNC, now on 1L nc for comfort. A & Ox 4, c/o left neck /throat pain 2/2 radiation prn norco given x 2 (1 pill) and prn Xanax 0.5 mg given x 2.   Radiation treatment done this afternoon.    Plan:  Transfer orders in place for Adult Med/Surg, radiation treatment to be done tomorrow morning and prn antianxiety and prn analgesics available. Continue w/goals of care.      Problem: Attention and Thought Clarity Impairment (Delirium)  Goal: Improved Attention and Thought Clarity  Outcome: Improving     Problem: Altered Behavior (Delirium)  Goal: Improved Behavioral Control  Outcome: Improving

## 2021-06-05 ENCOUNTER — APPOINTMENT (OUTPATIENT)
Dept: PHYSICAL THERAPY | Facility: CLINIC | Age: 71
DRG: 640 | End: 2021-06-05
Attending: STUDENT IN AN ORGANIZED HEALTH CARE EDUCATION/TRAINING PROGRAM
Payer: MEDICARE

## 2021-06-05 ENCOUNTER — ALLIED HEALTH/NURSE VISIT (OUTPATIENT)
Dept: RADIATION ONCOLOGY | Facility: CLINIC | Age: 71
End: 2021-06-05
Attending: RADIOLOGY
Payer: MEDICARE

## 2021-06-05 ENCOUNTER — APPOINTMENT (OUTPATIENT)
Dept: SPEECH THERAPY | Facility: CLINIC | Age: 71
DRG: 640 | End: 2021-06-05
Attending: INTERNAL MEDICINE
Payer: MEDICARE

## 2021-06-05 LAB
ANION GAP SERPL CALCULATED.3IONS-SCNC: 3 MMOL/L (ref 3–14)
BUN SERPL-MCNC: 21 MG/DL (ref 7–30)
CALCIUM SERPL-MCNC: 8.2 MG/DL (ref 8.5–10.1)
CHLORIDE SERPL-SCNC: 98 MMOL/L (ref 94–109)
CO2 SERPL-SCNC: 33 MMOL/L (ref 20–32)
CREAT SERPL-MCNC: 1.35 MG/DL (ref 0.66–1.25)
ERYTHROCYTE [DISTWIDTH] IN BLOOD BY AUTOMATED COUNT: 14.8 % (ref 10–15)
GFR SERPL CREATININE-BSD FRML MDRD: 53 ML/MIN/{1.73_M2}
GLUCOSE SERPL-MCNC: 96 MG/DL (ref 70–99)
HCT VFR BLD AUTO: 29.1 % (ref 40–53)
HGB BLD-MCNC: 9.3 G/DL (ref 13.3–17.7)
MCH RBC QN AUTO: 28.6 PG (ref 26.5–33)
MCHC RBC AUTO-ENTMCNC: 32 G/DL (ref 31.5–36.5)
MCV RBC AUTO: 90 FL (ref 78–100)
PLATELET # BLD AUTO: 103 10E9/L (ref 150–450)
POTASSIUM SERPL-SCNC: 3.9 MMOL/L (ref 3.4–5.3)
RBC # BLD AUTO: 3.25 10E12/L (ref 4.4–5.9)
SODIUM SERPL-SCNC: 135 MMOL/L (ref 133–144)
WBC # BLD AUTO: 2.2 10E9/L (ref 4–11)

## 2021-06-05 PROCEDURE — 120N000002 HC R&B MED SURG/OB UMMC

## 2021-06-05 PROCEDURE — 85027 COMPLETE CBC AUTOMATED: CPT | Performed by: STUDENT IN AN ORGANIZED HEALTH CARE EDUCATION/TRAINING PROGRAM

## 2021-06-05 PROCEDURE — 99232 SBSQ HOSP IP/OBS MODERATE 35: CPT | Performed by: STUDENT IN AN ORGANIZED HEALTH CARE EDUCATION/TRAINING PROGRAM

## 2021-06-05 PROCEDURE — 97530 THERAPEUTIC ACTIVITIES: CPT | Mod: GP

## 2021-06-05 PROCEDURE — 92526 ORAL FUNCTION THERAPY: CPT | Mod: GN

## 2021-06-05 PROCEDURE — 250N000013 HC RX MED GY IP 250 OP 250 PS 637: Performed by: STUDENT IN AN ORGANIZED HEALTH CARE EDUCATION/TRAINING PROGRAM

## 2021-06-05 PROCEDURE — 36415 COLL VENOUS BLD VENIPUNCTURE: CPT | Performed by: STUDENT IN AN ORGANIZED HEALTH CARE EDUCATION/TRAINING PROGRAM

## 2021-06-05 PROCEDURE — 97116 GAIT TRAINING THERAPY: CPT | Mod: GP

## 2021-06-05 PROCEDURE — 250N000011 HC RX IP 250 OP 636: Performed by: STUDENT IN AN ORGANIZED HEALTH CARE EDUCATION/TRAINING PROGRAM

## 2021-06-05 PROCEDURE — 77386 HC IMRT TREATMENT DELIVERY, COMPLEX: CPT | Performed by: RADIOLOGY

## 2021-06-05 PROCEDURE — 250N000013 HC RX MED GY IP 250 OP 250 PS 637: Performed by: HOSPITALIST

## 2021-06-05 PROCEDURE — 97161 PT EVAL LOW COMPLEX 20 MIN: CPT | Mod: GP

## 2021-06-05 PROCEDURE — 999N000127 HC STATISTIC PERIPHERAL IV START W US GUIDANCE

## 2021-06-05 PROCEDURE — 80048 BASIC METABOLIC PNL TOTAL CA: CPT | Performed by: STUDENT IN AN ORGANIZED HEALTH CARE EDUCATION/TRAINING PROGRAM

## 2021-06-05 RX ORDER — NALOXONE HYDROCHLORIDE 0.4 MG/ML
0.2 INJECTION, SOLUTION INTRAMUSCULAR; INTRAVENOUS; SUBCUTANEOUS
Status: DISCONTINUED | OUTPATIENT
Start: 2021-06-05 | End: 2021-06-09 | Stop reason: HOSPADM

## 2021-06-05 RX ORDER — HYDROCODONE BITARTRATE AND ACETAMINOPHEN 10; 325 MG/1; MG/1
1 TABLET ORAL EVERY 4 HOURS PRN
Status: DISCONTINUED | OUTPATIENT
Start: 2021-06-05 | End: 2021-06-07

## 2021-06-05 RX ORDER — NALOXONE HYDROCHLORIDE 0.4 MG/ML
0.4 INJECTION, SOLUTION INTRAMUSCULAR; INTRAVENOUS; SUBCUTANEOUS
Status: DISCONTINUED | OUTPATIENT
Start: 2021-06-05 | End: 2021-06-09 | Stop reason: HOSPADM

## 2021-06-05 RX ORDER — CETIRIZINE HYDROCHLORIDE 5 MG/1
5-10 TABLET ORAL DAILY PRN
Status: DISCONTINUED | OUTPATIENT
Start: 2021-06-05 | End: 2021-06-07

## 2021-06-05 RX ADMIN — ACETAMINOPHEN 650 MG: 325 TABLET, FILM COATED ORAL at 02:23

## 2021-06-05 RX ADMIN — PIPERACILLIN SODIUM AND TAZOBACTAM SODIUM 4.5 G: 4; .5 INJECTION, POWDER, LYOPHILIZED, FOR SOLUTION INTRAVENOUS at 01:21

## 2021-06-05 RX ADMIN — ONDANSETRON 4 MG: 2 INJECTION INTRAMUSCULAR; INTRAVENOUS at 22:46

## 2021-06-05 RX ADMIN — PIPERACILLIN SODIUM AND TAZOBACTAM SODIUM 4.5 G: 4; .5 INJECTION, POWDER, LYOPHILIZED, FOR SOLUTION INTRAVENOUS at 08:20

## 2021-06-05 RX ADMIN — HYDROCODONE BITARTRATE AND ACETAMINOPHEN 2 TABLET: 10; 325 TABLET ORAL at 04:59

## 2021-06-05 RX ADMIN — ROSUVASTATIN CALCIUM 40 MG: 20 TABLET, FILM COATED ORAL at 08:03

## 2021-06-05 RX ADMIN — ALPRAZOLAM 0.5 MG: 0.25 TABLET ORAL at 20:19

## 2021-06-05 RX ADMIN — CETIRIZINE HYDROCHLORIDE 10 MG: 5 TABLET ORAL at 02:24

## 2021-06-05 RX ADMIN — METHADONE HYDROCHLORIDE 5 MG: 5 TABLET ORAL at 14:56

## 2021-06-05 RX ADMIN — METHADONE HYDROCHLORIDE 5 MG: 5 TABLET ORAL at 20:11

## 2021-06-05 RX ADMIN — HYDROCODONE BITARTRATE AND ACETAMINOPHEN 1 TABLET: 10; 325 TABLET ORAL at 18:29

## 2021-06-05 RX ADMIN — ALPRAZOLAM 0.5 MG: 0.25 TABLET ORAL at 08:19

## 2021-06-05 RX ADMIN — PIPERACILLIN SODIUM AND TAZOBACTAM SODIUM 4.5 G: 4; .5 INJECTION, POWDER, LYOPHILIZED, FOR SOLUTION INTRAVENOUS at 18:53

## 2021-06-05 RX ADMIN — SILVER SULFADIAZINE: 10 CREAM TOPICAL at 08:21

## 2021-06-05 RX ADMIN — METHADONE HYDROCHLORIDE 5 MG: 5 TABLET ORAL at 08:09

## 2021-06-05 RX ADMIN — METOPROLOL SUCCINATE 50 MG: 50 TABLET, EXTENDED RELEASE ORAL at 08:09

## 2021-06-05 RX ADMIN — HYDROXYZINE HYDROCHLORIDE 50 MG: 25 TABLET, FILM COATED ORAL at 02:24

## 2021-06-05 RX ADMIN — PIPERACILLIN SODIUM AND TAZOBACTAM SODIUM 4.5 G: 4; .5 INJECTION, POWDER, LYOPHILIZED, FOR SOLUTION INTRAVENOUS at 12:35

## 2021-06-05 RX ADMIN — HYDROCODONE BITARTRATE AND ACETAMINOPHEN 1 TABLET: 10; 325 TABLET ORAL at 22:46

## 2021-06-05 RX ADMIN — OMEPRAZOLE 40 MG: 20 CAPSULE, DELAYED RELEASE ORAL at 08:09

## 2021-06-05 RX ADMIN — HYDROCODONE BITARTRATE AND ACETAMINOPHEN 2 TABLET: 10; 325 TABLET ORAL at 12:35

## 2021-06-05 RX ADMIN — DOCUSATE SODIUM 50 MG AND SENNOSIDES 8.6 MG 1 TABLET: 8.6; 5 TABLET, FILM COATED ORAL at 18:32

## 2021-06-05 RX ADMIN — ONDANSETRON 4 MG: 2 INJECTION INTRAMUSCULAR; INTRAVENOUS at 02:42

## 2021-06-05 RX ADMIN — CITALOPRAM HYDROBROMIDE 40 MG: 20 TABLET ORAL at 08:08

## 2021-06-05 ASSESSMENT — ACTIVITIES OF DAILY LIVING (ADL)
ADLS_ACUITY_SCORE: 14
ADLS_ACUITY_SCORE: 15

## 2021-06-05 NOTE — SUMMARY OF CARE
Pt brought, 3 bags, medical supplies, under garmets, vicenta pack, phone, wallet, battery and , mucinex change of clothes, earbuds misc. Supplies.

## 2021-06-05 NOTE — PLAN OF CARE
Assumed cares [id number]. VSS on RA ex HTN. Transferred from ICU following respiratory arrest. Patient stable now and was admitted to  for feeding tube placement. A&Ox4. Up with DAVID CHEUNG on for safety. Patient presents as anxious and manages with PRN Xanax QID and Atarax q6. Reports pain in his neck r/t radiation treatment and managing with PRN Norco q6 and scheduled pain medications. Radiation burns on neck, otherwise skin intact. Urinal at bedside and no BM this shift. Planning to have radiation treatment this AM. Will continue POC.

## 2021-06-05 NOTE — PLAN OF CARE
"Transferred to: 5B) at 0000  Status at time of transfer: stable, A/Ox4, anxious  Belongings: with patient  Stone removed? (if no, why?): voiding in urinal  Chart: Sent with patient  Family notified:  Alessandra (wife)    Several medications including narcotics found in patients room this shift. Medications were not in prescribed or dispensed bottles. Patient stated \"I have only taken the medications you guys have given to me since I have been admitted.\" Patient was educated on medication safety, medications were counted with 2nd RN, removed from patient's room, and given to security. Wife Alessandra was updated regarding medications.     "

## 2021-06-05 NOTE — PROGRESS NOTES
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     # Respiratory arrest due to opioids  # Likely aspiration pneumonitis    Patient developed respiratory arrest early yesterday morning, many hours after receiving opioids and benzodiazepines, but also suspect a degree of accumulation of methadone.  Patient initially on HFNC but weaned off oxygen now.  Interestingly patient states that he has been finding pills in his oropharynx and wondering if perhaps they are accumulating and not being swallowed.  There was some concern from nursing overnight about possible surreptitious use of home medications.        - continue to monitor closely  - oxygen as needed  - continue piperacillin/tazobactam, probably stop tomorrow, am PCT.   - monitor opioid use carefully, plan as below   - follow sputum culture    #SCC of the left tonsil (cT2, cN2, cM0)  # severe malnutrition  #Failure to thrive  #Nausea  #Hypomagnesemia    Patient has had 10lb weight loss, very poor oral intake and difficulty taking medications.  Patient strongly doesn't want NJ tube and is agreeable to a PEG tube, plan for this Monday.  Will encourage oral intake between now and then, no acute indication for TPN.     - appreciate IR and oncology assistance  - nutrition consult, will monitor for RFS after TF start  - monitor intake, but will hold off on TPN for now  - home care referral for TF  -  Omeprazole  - NPO after MN Sunday night  - appreciate assistance of   Leoncio    # Radiation burns: continue silvadene, wound care consult    # acute kidney injury, resolved  Patient with increased creatinine to 1.58 in setting of poor oral intake, now improved.     -continue fluids with poor intake  - follow BMP    #Chronic pain    Patient follows with Dr. Reza of palliative care.  At admission was on  methadone 10mg TID with up to 10 maximum  norco, but concern about excess dosing here in the hospital leading to respiratory arrest.     - continue methadone to 5 mg TID, consider slow increase in coming days  - change norco to 1 tablets q4h PRN  - consider changing norco to oxycodone and adding scheduled tylenol tomorrow, but patient quite reticent about this idea.      # Pancytopenia:  Likely effect of systemic radiation and chemotherapy.   Will recheck CBC with differential tomorrow.         #HLD  -continue rosuvastatin     #mucositis  -continue magic mouthwash, baking soda mouth rinses PRN    #Anxiety/depression  -continue atarax PRN   -continue celexa  -continue xanax PRN  -consider palliative care consult as above    Goals of Care     This cancer is likely curable and will continue restorative goals.              Diet: NPO per Anesthesia Guidelines for Procedure/Surgery Except for: Meds  Regular Diet Adult    DVT Prophylaxis: Pneumatic Compression Devices  Stone Catheter: not present  Code Status: No CPR- Do NOT Intubate           Disposition Plan   Expected discharge: 2 - 3 days, recommended to prior living arrangement once TF started.  Entered: Keyur Montemayor MD 06/05/2021, 8:43 AM       The patient's care was discussed with the Patient, Patient's Family and IR Consultant.    Keyur Montemayor MD  Hospitalist Service, [address]  Contact information available via [facility] Paging/Directory  Please see sign in/sign out for up to date coverage  information  ______________________________________________________________________    Interval History      Feeling quite well this morning, feeling some anxiety but pain fairly well controlled.  Not eating much.  Last bowel movement was 2 days ago.  Would like pain meds more frequently.  Able to wean off oxygen.     4 point review of systems otherwise negative.      Data reviewed today: I reviewed all medications, new labs and imaging results over the last 24 hours. I personally reviewed no images or EKG's today.    Physical Exam   Vital Signs: Temp: 95.6  F (35.3  C) Temp src: Oral BP: (!) 149/76 Pulse: 65   Resp: 18 SpO2: 100 % O2 Device: None (Room air) Oxygen Delivery: 1 LPM  Weight: 208 lbs 4.8 oz  General Appearance: Lying in bed, comfortable  Respiratory: clear to auscultation bilaterally with good air entry   Cardiovascular: normal rate, regular rhythm.   GI: soft, non-distended, non-distended.  No hepatosplenomegaly or mass  Skin: widespread thoracic radiation dermatitis with 1x4 cm open area over the left clavicular head, much improved.   Other: Severe radiation mucositis in oral cavity, somewhat improved.     Data   Recent Labs   Lab 06/05/21  0658 06/04/21  0624 06/02/21  2152   WBC 2.2* 2.3* 1.8*   HGB 9.3* 10.3* 9.6*   MCV 90 91 88   * 116* 88*    138 137   POTASSIUM 3.9 4.6 3.7   CHLORIDE 98 102 103   CO2 33* 31 31   BUN 21 21 24   CR 1.35* 1.29* 1.22   ANIONGAP 3 4 3   LATRELL 8.2* 8.8 8.0*   GLC 96 131* 93   ALBUMIN  --  2.3* 2.4*   PROTTOTAL  --  6.0* 5.8*   BILITOTAL  --  0.4 0.5   ALKPHOS  --  60 49   ALT  --  29 24   AST  --  34 24   TROPI  --  0.035  --

## 2021-06-05 NOTE — PLAN OF CARE
BP (!) 158/81 (BP Location: Right arm)   Pulse 67   Temp 97.8  F (36.6  C) (Oral)   Resp 18   Wt 97.6 kg (215 lb 1.6 oz)   SpO2 100%   BMI 32.23 kg/m      Assumed care 0700 to 1900  Pt rounded on hourly  Michael: alert and oriented. Pt has some anxiety. PRN xanax given once.   Pain: Pt has pain in his neck and with swallowing due to radiation. PRN NORCO given.   GI/: Denies nausea. Bowel sounds present. Good urine output clear yellow urine. Pt had BM today. Pt did not eat much 25% of breakfast due to pain with swallowing. Pt ate ice cream for a snack.   Cardiac: WDL   Respiratory: denies SOB lung sounds clear bilaterally  Skin: Red dry flaky skin to neck. Silver medication cream applied and Eucerin applied   Lines: R PIV saline locked intermittent infusing antibiotics   Assist level: I. Pt went for several walks this shift.   Will continue to monitor and follow plan of care.   Plan: PEG tube placement on Monday.

## 2021-06-05 NOTE — PROGRESS NOTES
I visited with Mr Murphy today. He was working with SLP at the time of the visit. Able to eat some ice cream with some throat discomfort but improved from previous. He has his swallowing exercises. We both discussed with him that it will be important for him to continue to try to eat even after the tube goes in. Patient is concerned about timing of radiation and wants to get done as soon as possible. Explained that Dr Robbins is facilitating this by the treating on Saturday. We explained this as well yesterday, patient asking about BID radiation which we explained is not recommended. He had questions about follow-up visits. Explained that appointments will be less frequent after completing treatment but does need to continue to be closely monitored after initially finishing, needs to see SLP as well as providers. Explained that he will see me and Dr Robbins together, likely with CT scan at 6 weeks post treatment.     Ligia Fang MD    Department of Otolaryngology

## 2021-06-05 NOTE — PLAN OF CARE
Reason for transfer: No longer require ICU level of care.  Transferred from: 4C  Report received from: RUTH Gutierrez  2 RN skin assessment completed by: Joyti WHITE RN and Darlene PARMAR RN  Bed algorithm reevaluated: Yes  Was Pulsate ordered?: No  Belongings: See ABIDA High note.

## 2021-06-05 NOTE — PROGRESS NOTES
06/05/21 0900   Quick Adds   Type of Visit Initial PT Evaluation  (Sophie Conklin, PT, DPT )       Present no   Living Environment   People in home spouse  (wife is retired RN )   Current Living Arrangements house  (split level )   Home Accessibility stairs within home   Number of Stairs, Within Home, Primary 7  (7 up 7 down handrail )   Stair Railings, Within Home, Primary railings safe and in good condition   Transportation Anticipated car, drives self;family or friend will provide   Living Environment Comments Pt lives with SO in split level home. tub shower with grab bar. both pt and SO drive. wife is retired RN    Self-Care   Usual Activity Tolerance good   Current Activity Tolerance moderate   Regular Exercise Yes   Activity/Exercise Type strength training;walking   Exercise Amount/Frequency 3-5 times/wk   Equipment Currently Used at Home none   Activity/Exercise/Self-Care Comment Pt reports going to the gym 3x/week. does not utilize AD at home.    Disability/Function   Hearing Difficulty or Deaf no   Wear Glasses or Blind no   Vision Management reports reading glasses   Concentrating, Remembering or Making Decisions Difficulty no   Difficulty Communicating no   Difficulty Eating/Swallowing yes   Eating/Swallowing eating;swallowing liquids;swallowing solid food   Walking or Climbing Stairs Difficulty no   Dressing/Bathing Difficulty no   Toileting issues no   Doing Errands Independently Difficulty (such as shopping) yes   Errands Management with SO    Fall history within last six months no   Change in Functional Status Since Onset of Current Illness/Injury yes   General Information   Onset of Illness/Injury or Date of Surgery 06/05/21   Referring Physician  Ruth Brantley MD   Patient/Family Therapy Goals Statement (PT) return home, maintain activity tolernace    Pertinent History of Current Problem (include personal factors and/or comorbidities that impact the POC) 70 year old  male known to palliative care clinic (Dr Serrano)  with PMH of 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 Following admission had what appeared to be aspiration/ respiratory suppression event (query if related to sedation from pain meds) and led to ICU admission.   Existing Precautions/Restrictions fall   General Observations activity: up with A    Cognition   Orientation Status (Cognition) oriented x 4   Affect/Mental Status (Cognition) WNL   Follows Commands (Cognition) WNL   Pain Assessment   Patient Currently in Pain Yes, see Vital Sign flowsheet   Integumentary/Edema   Integumentary/Edema no deficits were identifed   Posture    Posture Forward head position   Range of Motion (ROM)   ROM Comment WFL throughout    Strength   Strength Comments minor deconditioning    Bed Mobility   Comment (Bed Mobility) not observed today    Transfers   Transfer Safety Comments mod indep STS    Gait/Stairs (Locomotion)   Comment (Gait/Stairs) supervision without AD    Balance   Balance Comments not formally assessed   Sensory Examination   Sensory Perception WFL   Coordination   Coordination no deficits were identified   Muscle Tone   Muscle Tone no deficits were identified   Clinical Impression   Criteria for Skilled Therapeutic Intervention yes, treatment indicated   PT Diagnosis (PT) dec functional mobility    Influenced by the following impairments deconditioning, pain    Functional limitations due to impairments gait, activity tolerance, balance    Clinical Presentation Evolving/Changing  (ongoing cx treatments )   Clinical Presentation Rationale PMH, clinical judgement, current mobility    Clinical Decision Making (Complexity) low complexity    Therapy Frequency (PT) 3x/week   Predicted Duration of Therapy Intervention (days/wks) week    Planned Therapy Interventions (PT) balance training;gait training;home exercise program;strengthening   Anticipated Equipment Needs at Discharge (PT)   (TBD)   Risk & Benefits of therapy have been explained evaluation/treatment results reviewed;care plan/treatment goals reviewed;risks/benefits reviewed;participants included;patient   Clinical Impression Comments PT eval completed, tx indicated    PT Discharge Planning    PT Discharge Recommendation (DC Rec) home with assist   PT Rationale for DC Rec Pt performs most mobility mod indep - supervised. would benefit from A for fatigue management throughout treatment    PT Brief overview of current status  supervision - mod indep    Total Evaluation Time   Total Evaluation Time (Minutes) 8

## 2021-06-05 NOTE — PROVIDER NOTIFICATION
5B. 5232-1. D.B. Please place PRN Narcan orders. On narcotics and required Narcan this AM. Thanks, Jyoti #[id number]

## 2021-06-06 LAB
ANION GAP SERPL CALCULATED.3IONS-SCNC: 4 MMOL/L (ref 3–14)
BASOPHILS # BLD AUTO: 0 10E9/L (ref 0–0.2)
BASOPHILS NFR BLD AUTO: 0 %
BUN SERPL-MCNC: 15 MG/DL (ref 7–30)
CALCIUM SERPL-MCNC: 8.2 MG/DL (ref 8.5–10.1)
CHLORIDE SERPL-SCNC: 100 MMOL/L (ref 94–109)
CO2 SERPL-SCNC: 34 MMOL/L (ref 20–32)
CREAT SERPL-MCNC: 1.25 MG/DL (ref 0.66–1.25)
DIFFERENTIAL METHOD BLD: ABNORMAL
EOSINOPHIL # BLD AUTO: 0 10E9/L (ref 0–0.7)
EOSINOPHIL NFR BLD AUTO: 1.1 %
ERYTHROCYTE [DISTWIDTH] IN BLOOD BY AUTOMATED COUNT: 14.7 % (ref 10–15)
GFR SERPL CREATININE-BSD FRML MDRD: 58 ML/MIN/{1.73_M2}
GLUCOSE BLDC GLUCOMTR-MCNC: 85 MG/DL (ref 70–99)
GLUCOSE SERPL-MCNC: 69 MG/DL (ref 70–99)
HCT VFR BLD AUTO: 30.9 % (ref 40–53)
HGB BLD-MCNC: 9.7 G/DL (ref 13.3–17.7)
IMM GRANULOCYTES # BLD: 0 10E9/L (ref 0–0.4)
IMM GRANULOCYTES NFR BLD: 0.5 %
LYMPHOCYTES # BLD AUTO: 0.2 10E9/L (ref 0.8–5.3)
LYMPHOCYTES NFR BLD AUTO: 10.3 %
MAGNESIUM SERPL-MCNC: 1.5 MG/DL (ref 1.6–2.3)
MCH RBC QN AUTO: 28.2 PG (ref 26.5–33)
MCHC RBC AUTO-ENTMCNC: 31.4 G/DL (ref 31.5–36.5)
MCV RBC AUTO: 90 FL (ref 78–100)
MONOCYTES # BLD AUTO: 0.5 10E9/L (ref 0–1.3)
MONOCYTES NFR BLD AUTO: 27 %
NEUTROPHILS # BLD AUTO: 1.1 10E9/L (ref 1.6–8.3)
NEUTROPHILS NFR BLD AUTO: 61.1 %
NRBC # BLD AUTO: 0 10*3/UL
NRBC BLD AUTO-RTO: 0 /100
PLATELET # BLD AUTO: 114 10E9/L (ref 150–450)
PLATELET # BLD EST: ABNORMAL 10*3/UL
POTASSIUM SERPL-SCNC: 3.9 MMOL/L (ref 3.4–5.3)
PROCALCITONIN SERPL-MCNC: 0.43 NG/ML
RBC # BLD AUTO: 3.44 10E12/L (ref 4.4–5.9)
SODIUM SERPL-SCNC: 138 MMOL/L (ref 133–144)
WBC # BLD AUTO: 1.9 10E9/L (ref 4–11)

## 2021-06-06 PROCEDURE — 80048 BASIC METABOLIC PNL TOTAL CA: CPT | Performed by: STUDENT IN AN ORGANIZED HEALTH CARE EDUCATION/TRAINING PROGRAM

## 2021-06-06 PROCEDURE — 82962 GLUCOSE BLOOD TEST: CPT

## 2021-06-06 PROCEDURE — 120N000002 HC R&B MED SURG/OB UMMC

## 2021-06-06 PROCEDURE — 36415 COLL VENOUS BLD VENIPUNCTURE: CPT | Performed by: STUDENT IN AN ORGANIZED HEALTH CARE EDUCATION/TRAINING PROGRAM

## 2021-06-06 PROCEDURE — 84145 PROCALCITONIN (PCT): CPT | Performed by: STUDENT IN AN ORGANIZED HEALTH CARE EDUCATION/TRAINING PROGRAM

## 2021-06-06 PROCEDURE — 258N000003 HC RX IP 258 OP 636: Performed by: STUDENT IN AN ORGANIZED HEALTH CARE EDUCATION/TRAINING PROGRAM

## 2021-06-06 PROCEDURE — 85025 COMPLETE CBC W/AUTO DIFF WBC: CPT | Performed by: STUDENT IN AN ORGANIZED HEALTH CARE EDUCATION/TRAINING PROGRAM

## 2021-06-06 PROCEDURE — 83735 ASSAY OF MAGNESIUM: CPT | Performed by: STUDENT IN AN ORGANIZED HEALTH CARE EDUCATION/TRAINING PROGRAM

## 2021-06-06 PROCEDURE — 99232 SBSQ HOSP IP/OBS MODERATE 35: CPT | Performed by: STUDENT IN AN ORGANIZED HEALTH CARE EDUCATION/TRAINING PROGRAM

## 2021-06-06 PROCEDURE — 250N000013 HC RX MED GY IP 250 OP 250 PS 637: Performed by: STUDENT IN AN ORGANIZED HEALTH CARE EDUCATION/TRAINING PROGRAM

## 2021-06-06 PROCEDURE — 250N000013 HC RX MED GY IP 250 OP 250 PS 637: Performed by: HOSPITALIST

## 2021-06-06 PROCEDURE — 250N000011 HC RX IP 250 OP 636: Performed by: STUDENT IN AN ORGANIZED HEALTH CARE EDUCATION/TRAINING PROGRAM

## 2021-06-06 RX ORDER — MAGNESIUM OXIDE 400 MG/1
400 TABLET ORAL 3 TIMES DAILY
Status: DISCONTINUED | OUTPATIENT
Start: 2021-06-06 | End: 2021-06-07

## 2021-06-06 RX ADMIN — METHADONE HYDROCHLORIDE 5 MG: 5 TABLET ORAL at 07:57

## 2021-06-06 RX ADMIN — PIPERACILLIN SODIUM AND TAZOBACTAM SODIUM 4.5 G: 4; .5 INJECTION, POWDER, LYOPHILIZED, FOR SOLUTION INTRAVENOUS at 07:56

## 2021-06-06 RX ADMIN — HYDROCODONE BITARTRATE AND ACETAMINOPHEN 1 TABLET: 10; 325 TABLET ORAL at 06:46

## 2021-06-06 RX ADMIN — ALPRAZOLAM 0.5 MG: 0.25 TABLET ORAL at 20:32

## 2021-06-06 RX ADMIN — DOCUSATE SODIUM 50 MG AND SENNOSIDES 8.6 MG 1 TABLET: 8.6; 5 TABLET, FILM COATED ORAL at 07:56

## 2021-06-06 RX ADMIN — MAGNESIUM OXIDE TAB 400 MG (241.3 MG ELEMENTAL MG) 400 MG: 400 (241.3 MG) TAB at 20:24

## 2021-06-06 RX ADMIN — HYDROCODONE BITARTRATE AND ACETAMINOPHEN 1 TABLET: 10; 325 TABLET ORAL at 02:46

## 2021-06-06 RX ADMIN — METOPROLOL SUCCINATE 50 MG: 50 TABLET, EXTENDED RELEASE ORAL at 07:57

## 2021-06-06 RX ADMIN — CETIRIZINE HYDROCHLORIDE 10 MG: 5 TABLET ORAL at 22:26

## 2021-06-06 RX ADMIN — MAGNESIUM OXIDE TAB 400 MG (241.3 MG ELEMENTAL MG) 400 MG: 400 (241.3 MG) TAB at 08:15

## 2021-06-06 RX ADMIN — Medication 7.5 MG: at 20:24

## 2021-06-06 RX ADMIN — MAGNESIUM OXIDE TAB 400 MG (241.3 MG ELEMENTAL MG) 400 MG: 400 (241.3 MG) TAB at 14:23

## 2021-06-06 RX ADMIN — HYDROCODONE BITARTRATE AND ACETAMINOPHEN 1 TABLET: 10; 325 TABLET ORAL at 12:04

## 2021-06-06 RX ADMIN — PIPERACILLIN SODIUM AND TAZOBACTAM SODIUM 4.5 G: 4; .5 INJECTION, POWDER, LYOPHILIZED, FOR SOLUTION INTRAVENOUS at 00:43

## 2021-06-06 RX ADMIN — Medication 7.5 MG: at 14:23

## 2021-06-06 RX ADMIN — CITALOPRAM HYDROBROMIDE 40 MG: 20 TABLET ORAL at 07:56

## 2021-06-06 RX ADMIN — ALPRAZOLAM 0.5 MG: 0.25 TABLET ORAL at 07:56

## 2021-06-06 RX ADMIN — SILVER SULFADIAZINE: 10 CREAM TOPICAL at 08:13

## 2021-06-06 RX ADMIN — HYDROCODONE BITARTRATE AND ACETAMINOPHEN 1 TABLET: 10; 325 TABLET ORAL at 16:09

## 2021-06-06 RX ADMIN — HYDROCODONE BITARTRATE AND ACETAMINOPHEN 1 TABLET: 10; 325 TABLET ORAL at 22:26

## 2021-06-06 RX ADMIN — OMEPRAZOLE 40 MG: 20 CAPSULE, DELAYED RELEASE ORAL at 07:56

## 2021-06-06 RX ADMIN — DEXTROSE AND SODIUM CHLORIDE: 5; 900 INJECTION, SOLUTION INTRAVENOUS at 18:06

## 2021-06-06 RX ADMIN — ROSUVASTATIN CALCIUM 40 MG: 20 TABLET, FILM COATED ORAL at 07:56

## 2021-06-06 ASSESSMENT — ACTIVITIES OF DAILY LIVING (ADL)
ADLS_ACUITY_SCORE: 14
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
## 2021-06-06 NOTE — PLAN OF CARE
BP (!) 161/86 (BP Location: Left arm)   Pulse 73   Temp 97.8  F (36.6  C) (Oral)   Resp 18   Wt 97.6 kg (215 lb 1.6 oz)   SpO2 95%   BMI 32.23 kg/m    Assumed care 0700 to 1900  Pt rounded on hourly  Michael: alert and oriented. Pt has some anxiety. PRN xanax given once.   Pain: Pt has pain in his neck and with swallowing due to radiation. PRN NORCO given. Pt also has scheduled methadone  GI/: Denies nausea. Bowel sounds present. Good urine output clear yellow urine. Pt had 2 BMs today. Pt did not eat much  due to pain with swallowing. Pt ate ice cream for a snack. and 75% of dinner  Cardiac: WDL   Respiratory: denies SOB lung sounds clear bilaterally  Skin: Red dry flaky skin to neck. Silver medication cream applied and Eucerin applied   Lines: R PIV saline locked intermittent infusing antibiotics   Assist level: I. Pt went for walks this shift.   Will continue to monitor and follow plan of care.   Plan: PEG tube placement on Monday.

## 2021-06-07 ENCOUNTER — APPOINTMENT (OUTPATIENT)
Dept: INTERVENTIONAL RADIOLOGY/VASCULAR | Facility: CLINIC | Age: 71
DRG: 640 | End: 2021-06-07
Attending: PHYSICIAN ASSISTANT
Payer: MEDICARE

## 2021-06-07 ENCOUNTER — APPOINTMENT (OUTPATIENT)
Dept: RADIATION ONCOLOGY | Facility: CLINIC | Age: 71
End: 2021-06-07
Attending: RADIOLOGY
Payer: MEDICARE

## 2021-06-07 DIAGNOSIS — C09.9 SQUAMOUS CELL CARCINOMA OF TONSIL (H): Primary | ICD-10-CM

## 2021-06-07 LAB
ANION GAP SERPL CALCULATED.3IONS-SCNC: 3 MMOL/L (ref 3–14)
B-HCG FREE SERPL-ACNC: 1.5 [IU]/L (ref 0.86–1.14)
BACTERIA SPEC CULT: ABNORMAL
BACTERIA SPEC CULT: ABNORMAL
BASOPHILS # BLD AUTO: 0 10E9/L (ref 0–0.2)
BASOPHILS NFR BLD AUTO: 0 %
BUN SERPL-MCNC: 11 MG/DL (ref 7–30)
CALCIUM SERPL-MCNC: 7.8 MG/DL (ref 8.5–10.1)
CHLORIDE SERPL-SCNC: 101 MMOL/L (ref 94–109)
CO2 SERPL-SCNC: 33 MMOL/L (ref 20–32)
CREAT SERPL-MCNC: 1.19 MG/DL (ref 0.66–1.25)
DIFFERENTIAL METHOD BLD: ABNORMAL
EOSINOPHIL # BLD AUTO: 0 10E9/L (ref 0–0.7)
EOSINOPHIL NFR BLD AUTO: 0 %
ERYTHROCYTE [DISTWIDTH] IN BLOOD BY AUTOMATED COUNT: 14.6 % (ref 10–15)
GFR SERPL CREATININE-BSD FRML MDRD: 62 ML/MIN/{1.73_M2}
GLUCOSE SERPL-MCNC: 94 MG/DL (ref 70–99)
HCT VFR BLD AUTO: 30.1 % (ref 40–53)
HGB BLD-MCNC: 9.5 G/DL (ref 13.3–17.7)
INR PPP: 1.49 (ref 0.86–1.14)
LYMPHOCYTES # BLD AUTO: 0.1 10E9/L (ref 0.8–5.3)
LYMPHOCYTES NFR BLD AUTO: 8 %
MCH RBC QN AUTO: 28.1 PG (ref 26.5–33)
MCHC RBC AUTO-ENTMCNC: 31.6 G/DL (ref 31.5–36.5)
MCV RBC AUTO: 89 FL (ref 78–100)
METAMYELOCYTES # BLD: 0 10E9/L
METAMYELOCYTES NFR BLD MANUAL: 2.7 %
MONOCYTES # BLD AUTO: 0.2 10E9/L (ref 0–1.3)
MONOCYTES NFR BLD AUTO: 14.3 %
NEUTROPHILS # BLD AUTO: 1.3 10E9/L (ref 1.6–8.3)
NEUTROPHILS NFR BLD AUTO: 75 %
PLATELET # BLD AUTO: 116 10E9/L (ref 150–450)
POTASSIUM SERPL-SCNC: 3.2 MMOL/L (ref 3.4–5.3)
POTASSIUM SERPL-SCNC: 3.3 MMOL/L (ref 3.4–5.3)
RBC # BLD AUTO: 3.38 10E12/L (ref 4.4–5.9)
RBC MORPH BLD: NORMAL
SODIUM SERPL-SCNC: 136 MMOL/L (ref 133–144)
SPECIMEN SOURCE: ABNORMAL
WBC # BLD AUTO: 1.7 10E9/L (ref 4–11)

## 2021-06-07 PROCEDURE — 250N000009 HC RX 250: Performed by: RADIOLOGY

## 2021-06-07 PROCEDURE — 85610 PROTHROMBIN TIME: CPT

## 2021-06-07 PROCEDURE — 250N000013 HC RX MED GY IP 250 OP 250 PS 637: Performed by: INTERNAL MEDICINE

## 2021-06-07 PROCEDURE — C1769 GUIDE WIRE: HCPCS

## 2021-06-07 PROCEDURE — 250N000013 HC RX MED GY IP 250 OP 250 PS 637: Performed by: STUDENT IN AN ORGANIZED HEALTH CARE EDUCATION/TRAINING PROGRAM

## 2021-06-07 PROCEDURE — 99152 MOD SED SAME PHYS/QHP 5/>YRS: CPT

## 2021-06-07 PROCEDURE — 250N000011 HC RX IP 250 OP 636: Performed by: RADIOLOGY

## 2021-06-07 PROCEDURE — 99233 SBSQ HOSP IP/OBS HIGH 50: CPT | Performed by: STUDENT IN AN ORGANIZED HEALTH CARE EDUCATION/TRAINING PROGRAM

## 2021-06-07 PROCEDURE — 99152 MOD SED SAME PHYS/QHP 5/>YRS: CPT | Mod: GC | Performed by: RADIOLOGY

## 2021-06-07 PROCEDURE — 85025 COMPLETE CBC W/AUTO DIFF WBC: CPT | Performed by: STUDENT IN AN ORGANIZED HEALTH CARE EDUCATION/TRAINING PROGRAM

## 2021-06-07 PROCEDURE — 120N000002 HC R&B MED SURG/OB UMMC

## 2021-06-07 PROCEDURE — 250N000011 HC RX IP 250 OP 636: Performed by: NURSE PRACTITIONER

## 2021-06-07 PROCEDURE — 272N000588 ZZ HC TUBE GASTRO CR5

## 2021-06-07 PROCEDURE — 84132 ASSAY OF SERUM POTASSIUM: CPT | Performed by: STUDENT IN AN ORGANIZED HEALTH CARE EDUCATION/TRAINING PROGRAM

## 2021-06-07 PROCEDURE — 258N000003 HC RX IP 258 OP 636: Performed by: STUDENT IN AN ORGANIZED HEALTH CARE EDUCATION/TRAINING PROGRAM

## 2021-06-07 PROCEDURE — 85610 PROTHROMBIN TIME: CPT | Performed by: PATHOLOGY

## 2021-06-07 PROCEDURE — 49440 PLACE GASTROSTOMY TUBE PERC: CPT | Mod: GC | Performed by: RADIOLOGY

## 2021-06-07 PROCEDURE — 250N000011 HC RX IP 250 OP 636: Performed by: STUDENT IN AN ORGANIZED HEALTH CARE EDUCATION/TRAINING PROGRAM

## 2021-06-07 PROCEDURE — 80048 BASIC METABOLIC PNL TOTAL CA: CPT | Performed by: STUDENT IN AN ORGANIZED HEALTH CARE EDUCATION/TRAINING PROGRAM

## 2021-06-07 PROCEDURE — 36415 COLL VENOUS BLD VENIPUNCTURE: CPT | Performed by: PATHOLOGY

## 2021-06-07 PROCEDURE — 49440 PLACE GASTROSTOMY TUBE PERC: CPT

## 2021-06-07 PROCEDURE — 99153 MOD SED SAME PHYS/QHP EA: CPT

## 2021-06-07 PROCEDURE — 255N000002 HC RX 255 OP 636: Performed by: RADIOLOGY

## 2021-06-07 PROCEDURE — 36415 COLL VENOUS BLD VENIPUNCTURE: CPT | Performed by: STUDENT IN AN ORGANIZED HEALTH CARE EDUCATION/TRAINING PROGRAM

## 2021-06-07 PROCEDURE — 250N000013 HC RX MED GY IP 250 OP 250 PS 637: Performed by: HOSPITALIST

## 2021-06-07 PROCEDURE — 77386 HC IMRT TREATMENT DELIVERY, COMPLEX: CPT | Performed by: RADIOLOGY

## 2021-06-07 PROCEDURE — 272N000151 HC KIT CR11

## 2021-06-07 PROCEDURE — 99233 SBSQ HOSP IP/OBS HIGH 50: CPT | Performed by: INTERNAL MEDICINE

## 2021-06-07 PROCEDURE — 0DH63UZ INSERTION OF FEEDING DEVICE INTO STOMACH, PERCUTANEOUS APPROACH: ICD-10-PCS | Performed by: RADIOLOGY

## 2021-06-07 RX ORDER — DEXTROSE MONOHYDRATE 100 MG/ML
INJECTION, SOLUTION INTRAVENOUS CONTINUOUS PRN
Status: DISCONTINUED | OUTPATIENT
Start: 2021-06-07 | End: 2021-06-09 | Stop reason: HOSPADM

## 2021-06-07 RX ORDER — NALOXONE HYDROCHLORIDE 0.4 MG/ML
0.2 INJECTION, SOLUTION INTRAMUSCULAR; INTRAVENOUS; SUBCUTANEOUS
Status: DISCONTINUED | OUTPATIENT
Start: 2021-06-07 | End: 2021-06-07 | Stop reason: HOSPADM

## 2021-06-07 RX ORDER — ACETAMINOPHEN 325 MG/10.15ML
650 LIQUID ORAL EVERY 4 HOURS PRN
Status: DISCONTINUED | OUTPATIENT
Start: 2021-06-07 | End: 2021-06-09 | Stop reason: HOSPADM

## 2021-06-07 RX ORDER — FENTANYL CITRATE 50 UG/ML
25-50 INJECTION, SOLUTION INTRAMUSCULAR; INTRAVENOUS EVERY 5 MIN PRN
Status: DISCONTINUED | OUTPATIENT
Start: 2021-06-07 | End: 2021-06-07 | Stop reason: HOSPADM

## 2021-06-07 RX ORDER — HYDROXYZINE HYDROCHLORIDE 25 MG/1
50 TABLET, FILM COATED ORAL EVERY 6 HOURS PRN
Status: DISCONTINUED | OUTPATIENT
Start: 2021-06-07 | End: 2021-06-09 | Stop reason: HOSPADM

## 2021-06-07 RX ORDER — POTASSIUM CHLORIDE 750 MG/1
40 TABLET, EXTENDED RELEASE ORAL ONCE
Status: COMPLETED | OUTPATIENT
Start: 2021-06-07 | End: 2021-06-07

## 2021-06-07 RX ORDER — OMEPRAZOLE
40 KIT
Status: DISCONTINUED | OUTPATIENT
Start: 2021-06-08 | End: 2021-06-09 | Stop reason: HOSPADM

## 2021-06-07 RX ORDER — ALPRAZOLAM 0.25 MG
0.5 TABLET ORAL 4 TIMES DAILY PRN
Status: DISCONTINUED | OUTPATIENT
Start: 2021-06-07 | End: 2021-06-09 | Stop reason: HOSPADM

## 2021-06-07 RX ORDER — POTASSIUM CHLORIDE 20MEQ/15ML
40 LIQUID (ML) ORAL ONCE
Status: COMPLETED | OUTPATIENT
Start: 2021-06-07 | End: 2021-06-08

## 2021-06-07 RX ORDER — METHADONE HYDROCHLORIDE 5 MG/5ML
7.5 SOLUTION ORAL 3 TIMES DAILY
Status: DISCONTINUED | OUTPATIENT
Start: 2021-06-07 | End: 2021-06-07

## 2021-06-07 RX ORDER — CETIRIZINE HYDROCHLORIDE 5 MG/1
5-10 TABLET ORAL DAILY PRN
Status: DISCONTINUED | OUTPATIENT
Start: 2021-06-07 | End: 2021-06-09 | Stop reason: HOSPADM

## 2021-06-07 RX ORDER — CITALOPRAM HYDROBROMIDE 20 MG/10ML
40 SOLUTION, ORAL ORAL DAILY
Status: DISCONTINUED | OUTPATIENT
Start: 2021-06-08 | End: 2021-06-09 | Stop reason: HOSPADM

## 2021-06-07 RX ORDER — IODIXANOL 320 MG/ML
50 INJECTION, SOLUTION INTRAVASCULAR ONCE
Status: COMPLETED | OUTPATIENT
Start: 2021-06-07 | End: 2021-06-07

## 2021-06-07 RX ORDER — ACETAMINOPHEN 325 MG/10.15ML
650 LIQUID ORAL 3 TIMES DAILY
Status: DISCONTINUED | OUTPATIENT
Start: 2021-06-07 | End: 2021-06-09 | Stop reason: HOSPADM

## 2021-06-07 RX ORDER — NALOXONE HYDROCHLORIDE 0.4 MG/ML
0.4 INJECTION, SOLUTION INTRAMUSCULAR; INTRAVENOUS; SUBCUTANEOUS
Status: DISCONTINUED | OUTPATIENT
Start: 2021-06-07 | End: 2021-06-07 | Stop reason: HOSPADM

## 2021-06-07 RX ORDER — FLUMAZENIL 0.1 MG/ML
0.2 INJECTION, SOLUTION INTRAVENOUS
Status: DISCONTINUED | OUTPATIENT
Start: 2021-06-07 | End: 2021-06-07 | Stop reason: HOSPADM

## 2021-06-07 RX ORDER — ROSUVASTATIN CALCIUM 40 MG/1
40 TABLET, COATED ORAL DAILY
Status: DISCONTINUED | OUTPATIENT
Start: 2021-06-08 | End: 2021-06-09 | Stop reason: HOSPADM

## 2021-06-07 RX ORDER — METHADONE HYDROCHLORIDE 5 MG/5ML
7.5 SOLUTION ORAL 3 TIMES DAILY
Status: DISCONTINUED | OUTPATIENT
Start: 2021-06-07 | End: 2021-06-09 | Stop reason: HOSPADM

## 2021-06-07 RX ORDER — HYDROCODONE BITARTRATE AND ACETAMINOPHEN 10; 325 MG/1; MG/1
1 TABLET ORAL EVERY 4 HOURS PRN
Status: DISCONTINUED | OUTPATIENT
Start: 2021-06-07 | End: 2021-06-09 | Stop reason: HOSPADM

## 2021-06-07 RX ORDER — AMINO AC/PROTEIN HYDR/WHEY PRO 10G-100/30
1 LIQUID (ML) ORAL 2 TIMES DAILY
Status: DISCONTINUED | OUTPATIENT
Start: 2021-06-07 | End: 2021-06-09 | Stop reason: HOSPADM

## 2021-06-07 RX ORDER — LIDOCAINE HYDROCHLORIDE 20 MG/ML
JELLY TOPICAL ONCE
Status: COMPLETED | OUTPATIENT
Start: 2021-06-07 | End: 2021-06-07

## 2021-06-07 RX ADMIN — GLUCAGON HYDROCHLORIDE 1 MG: 1 INJECTION, POWDER, FOR SOLUTION INTRAMUSCULAR; INTRAVENOUS; SUBCUTANEOUS at 11:24

## 2021-06-07 RX ADMIN — Medication 25 MG: at 20:00

## 2021-06-07 RX ADMIN — CITALOPRAM HYDROBROMIDE 40 MG: 20 TABLET ORAL at 07:28

## 2021-06-07 RX ADMIN — POTASSIUM CHLORIDE 40 MEQ: 750 TABLET, EXTENDED RELEASE ORAL at 12:39

## 2021-06-07 RX ADMIN — CEFAZOLIN SODIUM 2 G: 2 INJECTION, SOLUTION INTRAVENOUS at 11:50

## 2021-06-07 RX ADMIN — METHADONE HYDROCHLORIDE 7.5 MG: 5 SOLUTION ORAL at 21:56

## 2021-06-07 RX ADMIN — HYDROCODONE BITARTRATE AND ACETAMINOPHEN 1 TABLET: 10; 325 TABLET ORAL at 02:17

## 2021-06-07 RX ADMIN — ROSUVASTATIN CALCIUM 40 MG: 20 TABLET, FILM COATED ORAL at 07:28

## 2021-06-07 RX ADMIN — IODIXANOL 20 ML: 320 INJECTION, SOLUTION INTRAVASCULAR at 11:56

## 2021-06-07 RX ADMIN — ACETAMINOPHEN 650 MG: 325 TABLET, FILM COATED ORAL at 01:48

## 2021-06-07 RX ADMIN — LIDOCAINE HYDROCHLORIDE: 20 JELLY TOPICAL at 11:54

## 2021-06-07 RX ADMIN — Medication 7.5 MG: at 14:08

## 2021-06-07 RX ADMIN — Medication 1 PACKET: at 20:02

## 2021-06-07 RX ADMIN — LIDOCAINE HYDROCHLORIDE 15 ML: 10 INJECTION, SOLUTION EPIDURAL; INFILTRATION; INTRACAUDAL; PERINEURAL at 11:52

## 2021-06-07 RX ADMIN — Medication 15 ML: at 18:23

## 2021-06-07 RX ADMIN — HYDROCODONE BITARTRATE AND ACETAMINOPHEN 1 TABLET: 10; 325 TABLET ORAL at 16:37

## 2021-06-07 RX ADMIN — MAGNESIUM OXIDE TAB 400 MG (241.3 MG ELEMENTAL MG) 400 MG: 400 (241.3 MG) TAB at 07:26

## 2021-06-07 RX ADMIN — DEXTROSE AND SODIUM CHLORIDE: 5; 900 INJECTION, SOLUTION INTRAVENOUS at 06:53

## 2021-06-07 RX ADMIN — ONDANSETRON 4 MG: 2 INJECTION INTRAMUSCULAR; INTRAVENOUS at 11:35

## 2021-06-07 RX ADMIN — HYDROCODONE BITARTRATE AND ACETAMINOPHEN 1 TABLET: 10; 325 TABLET ORAL at 12:42

## 2021-06-07 RX ADMIN — METOPROLOL SUCCINATE 50 MG: 50 TABLET, EXTENDED RELEASE ORAL at 07:28

## 2021-06-07 RX ADMIN — MAGNESIUM OXIDE TAB 400 MG (241.3 MG ELEMENTAL MG) 400 MG: 400 (241.3 MG) TAB at 14:10

## 2021-06-07 RX ADMIN — FENTANYL CITRATE 150 MCG: 50 INJECTION, SOLUTION INTRAMUSCULAR; INTRAVENOUS at 11:46

## 2021-06-07 RX ADMIN — ALPRAZOLAM 0.5 MG: 0.25 TABLET ORAL at 15:37

## 2021-06-07 RX ADMIN — MIDAZOLAM 3.5 MG: 1 INJECTION INTRAMUSCULAR; INTRAVENOUS at 11:46

## 2021-06-07 RX ADMIN — OMEPRAZOLE 40 MG: 20 CAPSULE, DELAYED RELEASE ORAL at 07:27

## 2021-06-07 RX ADMIN — CETIRIZINE HYDROCHLORIDE 5 MG: 5 TABLET ORAL at 07:40

## 2021-06-07 RX ADMIN — ACETAMINOPHEN 650 MG: 325 SOLUTION ORAL at 20:00

## 2021-06-07 RX ADMIN — HYDROCODONE BITARTRATE AND ACETAMINOPHEN 1 TABLET: 10; 325 TABLET ORAL at 06:24

## 2021-06-07 RX ADMIN — Medication 7.5 MG: at 07:26

## 2021-06-07 RX ADMIN — ALPRAZOLAM 0.5 MG: 0.25 TABLET ORAL at 01:48

## 2021-06-07 RX ADMIN — CETIRIZINE HYDROCHLORIDE 5 MG: 5 TABLET ORAL at 07:27

## 2021-06-07 ASSESSMENT — ACTIVITIES OF DAILY LIVING (ADL)
ADLS_ACUITY_SCORE: 14
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
## 2021-06-07 NOTE — PROVIDER NOTIFICATION
5B. 5232-1. D.B. Pt reporting severe pain in throat and requesting additional pain medication, something stronger than Tylenol. Thanks, Jyoti #[id number]    Addendum: Provider instructed to give Norco early.

## 2021-06-07 NOTE — PROCEDURES
[facility]    Procedure: Percutaneous gastrtostomy tube placement    Date/Time: 6/7/2021 12:09 PM  Performed by: Eusebio Rodríguez MD  Authorized by: Eusbeio Rodríguez MD   IR Fellow Physician: Eusebio Rodríguez  Radiology Resident Physician: Cece Magana      UNIVERSAL PROTOCOL   Site Marked: Yes  Prior Images Obtained and Reviewed:  Yes  Required items: Required blood products, implants, devices and special equipment available    Patient identity confirmed:  Verbally with patient, arm band, provided demographic data and hospital-assigned identification number  Patient was reevaluated immediately before administering moderate or deep sedation or anesthesia  Confirmation Checklist:  Patient's identity using two indicators, relevant allergies, procedure was appropriate and matched the consent or emergent situation and correct equipment/implants were available  Time out: Immediately prior to the procedure a time out was called    Universal Protocol: the Joint Commission Universal Protocol was followed    Preparation: Patient was prepped and draped in usual sterile fashion    ESBL (mL):  2         ANESTHESIA    Anesthesia: Local infiltration  Local Anesthetic:  Lidocaine 1% without epinephrine  Anesthetic Total (mL):  5      SEDATION    Patient Sedated: Yes    Sedation:  Fentanyl and midazolam  Vital signs: Vital signs monitored during sedation    Fluoroscopy Time: 6 minute(s)  See dictated procedure note for full details.  Findings: .    Specimens: none    Complications: None    Condition: Stable    Plan: F/u 7 days for 1 x suture and 2 x skin fastener removal    PROCEDURE   Patient Tolerance:  Patient tolerated the procedure well with no immediate complications    Length of time physician/provider present for 1:1 monitoring during sedation: 25

## 2021-06-07 NOTE — IR NOTE
Patient Name: Quan Murphy  Medical Record Number: [id number]  Today's Date: 6/7/2021    Procedure: Percutaneous G Tube Placement  Proceduralist: Cece De La Torre MD (On-Call Pager #[phone])    Patient in room: 1026  Procedure Start: 1118  Procedure end: 1155  Sedation medications administered: 3.5 mg midazolam, 150 mcg fentanyl    Report given to: Edith MIRANDA on  at 1212  : n/a    Other Notes: Pt arrived to IR room 3 from . Consent reviewed. Pt denies any questions or concerns regarding procedure. Pt positioned supine and monitored per protocol. Pt tolerated procedure. Pt transferred back to .

## 2021-06-07 NOTE — PROGRESS NOTES
CLINICAL NUTRITION SERVICES - REASSESSMENT NOTE     Nutrition Prescription    RECOMMENDATIONS FOR MDs/PROVIDERS TO ORDER:  Continue with TF + PO to meet nutrition needs.     Malnutrition Status:    Severe malnutrition in the context of chronic condition    Recommendations already ordered by Registered Dietitian (RD):  Ordered the following EN orders per previous RD recs on 6/3:     Access: PEG tube  Dosing wt: 77 kg (adjusted based on lowest wt of 94.5 kg and IBW)    EN: initiate tube feeds with Jevity 1.5 Chad @ 10 ml/hr x 8 hrs. If tolerated and lytes within acceptable limits (K+ > 3.0, Mg++ > 1.5 and PO4 > 2.0), advance rate by 10 ml every 8 hrs to goal of 50 ml/hr.     --Provision: Jevity 1.5 @ goal 50 ml/hr (1200 ml/day) to provide 1800 kcals (23  Kcal/kg/day + Pending PO), 76 gm PRO (1.0 g/kg/day), 912 ml free H2O, 258 gm CHO and 25 gm Fiber daily.    -Modules: Prosource protein supplement: 1 packet BID via FT ( provides additional 80 kcal and 22 gms protein). TF+Prosource=> 1880 kcal (24 kcal/kg + Pending PO) and 98 gm protein/day (1.3 gm/kg).     -Multivitamin/mineral: (Certavite 15 ml/day) via TF to help ensure micronutrient needs are met due to potential for interruptions to infusion.    - Refeeding precautions: Once TF initiated, check daily K+, Mg++ and PO4 until TF advances to goal rate to evaluate for refeeding and need for Lyte replacement    -Free water flushes: 60 ml every 4 hrs (for tube patency).    Future/Additional Recommendations:  Enteral Nutrition recommendations for Bolus TFs when patient is tolerating goal TF infusion x 24 hours:     Formula: Jevity 1.5 Chad  Volume: 5 cartons/day (1200 mL, 912 ml free water)  Provisions: 1800 kcal (23 kcal/kg + pending PO), 76 gm protein (1.0 g/kg), 258 gm CHO, 25 gm fiber     Suggested Tube feeding schedule via gravity feedings  Day 1: 1/2 carton formula QID spread 3-4 hours apart     Day 2: 1 carton formula QID spread 3-4 hours apart     Day 3:  1 1/2  cartons (360 ml) formula BID; plus add'l 1 carton (240 ml) as 3rd and 4th feeding  - spread 3-4 hours apart      **Flush with 120 mL water before and after each feeding to meet 100% hydration      **Do not give feedings at night while patient asleep (during the day only).        ** IF patient's weight declines, recommend increasing to 6 cartons/day (1 1/2 carton QID)       Provider consult: Registered Dietitian to order TF per Medical Nutrition Therapy Guidelines  --Per chart review, Nutrition consult for TF, start later this afternoon    EVALUATION OF THE PROGRESS TOWARD GOALS   Diet:   --Clear Liquid, NPO since midnight for PEG placement     Nutrition Support:   --PEG placed today 6/7/21    Intake:   --Previously on regular diet with frequent NPO status for test / procedures/SLP evaluation. Intake recorded as 25% and 75% of 2 meals since admit.        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     --Skin: Per chart review, Severe radiation mucositis in oral cavity, somewhat improved.     Labs noted:  K+:3.2 (L), Mg++/Phos:N/A   WBC:1.7 (L)  Ketones urine:20 (5/27),  BUN:11, Cr:1.19, GFR:62    Wt trend: Admit wt 95.8 kg on 6/2/21 --> up to 97.6 kg (6/5), likely reflects volume status changes. Lowest wt 94.5 kg on 6/3.      GI/stool: x6 (6/6), x1 (6/5)  Meds reviewed: Bowel regimen       MALNUTRITION   % Intake: suspect < 75% for > 1 month  (Severe)   % Weight Loss: previously  > 2% in 1 week (severe)  Subcutaneous Fat Loss: Unable to assess  Muscle Loss: Unable to assess  Fluid Accumulation/Edema: None noted  Malnutrition Diagnosis: Severe malnutrition in the context of chronic  condition    Previous Goals   Initiation of nutrition support within 24 to 48 hrs  Evaluation: Met    Previous Nutrition Diagnosis  Inadequate oral intake related to side effects from chemoradiation (decreased appetite, odynophagia, mucositis and dysgeusia) hindering po as evidenced by wt loss of 20 lbs (8.8% loss) x past 3 weeks, poor po intakes per chart review and no po intakes at present.  Evaluation: No change    CURRENT NUTRITION DIAGNOSIS  Inadequate oral intake related to side effects from chemoradiation (decreased appetite, odynophagia, mucositis and dysgeusia) hindering po as evidenced by wt loss of 20 lbs (8.8% loss) x past 3 weeks, poor po intakes PTA per chart review and Plan to start TF support tonight via newly placed PEG     INTERVENTIONS  Implementation  Enteral Nutrition - Initiate  Feeding tube flush    Goals  Total avg nutritional intake to meet a minimum of 1925 kcal/kg and 92 gm PRO/kg daily (per dosing wt 77 kg adjusted wt).    Monitoring/Evaluation  Progress toward goals will be monitored and evaluated per protocol.    Raad Gray RD/GRUPO  Pager [phone]

## 2021-06-07 NOTE — PRE-PROCEDURE
GENERAL PRE-PROCEDURE:   Procedure:  G tube placement  Date/Time:  6/7/2021 10:46 AM    Written consent obtained?: Yes    Risks and benefits: Risks, benefits and alternatives were discussed    Consent given by:  Patient  Patient states understanding of procedure being performed: Yes    Patient's understanding of procedure matches consent: Yes    Procedure consent matches procedure scheduled: Yes    Expected level of sedation:  Moderate  Appropriately NPO:  Yes  ASA Class:  Class 3- Severe systemic disease, definite functional limitations  Mallampati  :  Grade 2- soft palate, base of uvula, tonsillar pillars, and portion of posterior pharyngeal wall visible  Lungs:  Other (comment)  Heart:  Other (comment)  History & Physical reviewed:  History and physical reviewed and no updates needed  Statement of review:  I have reviewed the lab findings, diagnostic data, medications, and the plan for sedation

## 2021-06-07 NOTE — PROGRESS NOTES
BRIEF ONCOLOGY NOTE      This patient was not seen by the oncology team today. Chart and labs were reviewed.     Agree with PEG placement today with subsequent initiation of TF. Appreciate palliative team's input for symptom management. Continue XRT through 6/14 with RadOnc. From oncologic perspective, we do not have any further recommendations for this admission. We will sign off.     Patient was scheduled for appointment with primary oncologist Dr. Schrader tomorrow 6/8. We requested that this be rescheduled by at least one week (6/15 or next available).     Please do not hesitate to reach out if there are questions/concerns that arise. Thank you for your consultation and continued care of this patient.     Brittni Hill PA-C  Hematology/Oncology  Pager: [phone]  Desk phone: [phone]
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
## 2021-06-07 NOTE — PROGRESS NOTES
"[facility]  Palliative Care Daily Progress Note       Recommendations & Counseling       Pain control: I reviewed New's records and his pain medications prior to this admission were changing as his pain increased and it appears that he was taking methadone 10mg TID along with up to 10 tabs maximum  norco and doxepin swish and spit.  For sleep he was taking ambien and xanax (Dr Serrano tried to decrease to max of 2 tabs at night but that didn't work for him so he went back to 4 tabs at night). I met with New and wife, along with Dr Montemayor and at that time, New was agreeable to scheduling tylenol and continued PRN opioid but when I went back to ask more questions about his medications PTA, New became very angry with me. He feels his care is not good here and that everyone is suspicious of his medication use and everyone is \"ganging up on him\" and no one knows what medications he is on and no one has his medications straight and that he will never come back to the  for his care and that when he leaves here, he will go back to his PCP who has been managing his medications for a long time and knows him well and then he asked me to leave. I remain concerned about self escalation of his medications and will continue to work on a scheduled regimen in order to avoid harm. He did tell me that he was only taking Narco 4 times a day so we will go back to that regimen and see if his pain is managed. He has a hx of etoh abuse and Dr Serrano counseled no etoh use.     Gtube placed today which could help to decrease his throat / mouth pain since he doesn't have to force himself to eat enough to get proper nutrition.     Continue methadone 7.5mg TID (note that his methadone has had many changes recently due to increasing pain with radiation: 5/17-5/24 5mg TID; 5/24-6/1 5mg QID; 6/1 increased 10mg TID then episode of sedation in the hospital and decreased to 5mg TID) "     Will schedule tylenol 650mg TID    Continue narco 10/325mg q4hrs PRN for now. Would ideally like to increase scheduled tylenol to 650mg QID and decrease narco 10/325mg to q6hrs PRN is pain is well enough controlled on the increase methadone.     Will hold off on restarting doxepin rinse as patient feels that he is taking too many medications.     Insomnia - continue long standing ambien and xanax        Thank you for the opportunity to continue to participate in the care of this patient and family.  Please feel free to contact on-call palliative provider with any emergent needs.  We can be reached via team pager [phone] (answered 8-4:30 Monday-Friday); after-hours answering service ([phone]);     Neema Andrew MD / Palliative Medicine / Pager [phone]     Total time spent was 55 minutes spent in reviewing record, patient examination and family counseling, discussion with primary team and documentation. >50% of time was spent counseling and/or coordination of care regarding pain management, symptom management, plan of care.     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 Following admission had what appeared to be aspiration/ respiratory suppression event (query if related to sedation from pain meds) and led to ICU admission. He is followed by Dr Schrader and is receiving chemo/rad for cancer directed therapies. Last saw Dr Serrano in PC clinic at the end of May.   Had placement of IR guided G tube on 6/7.     Today, the patient was seen for:  SCC of left tonsil   Throat pain due to  above  FTT  constipation    Prognosis, Goals, or Advance Care Planning was addressed today with: Yes. Dr Montemayor discussed potential for cure of cancer with current treatment.     Mood, coping, and/or meaning in the context of serious illness were addressed today: Yes.              Interval History:     Chart review/discussion with unit or clinical team members:   No acute events over night. Pt asking for more frequent Narco availability     Hx of chronic pain (headaches/sinuses) on long term hydrocodone therapy.   PTA:  Hydrocodone 10mg/APAP tabs, up to 10 a day (prior was on 10 a day of 7.5 mg tabs)  Methadone 5mg qid  (I'd like to start gabap too but didn't add today given all the other changes)     For insomnia and mucositis:  Doxepin  Asked him to limit xanax to 2 pills overnight not 4.  Ok to continue ambien.    Per patient or family/caregivers today:  Patient and wife very upset with care at the hospital. He was mad that he was initially turned away from the hospital as no beds available and then drove 60 miles home only to be called back with bed availability. New also very upset feeling like no one knows what medications he is taking and no one trusting his ability to take his medications.     Key Palliative Symptoms:   Pain location: throat and neck as well as chronic head / sinus pain  # Pain severity the last 12 hours: moderate  # Dyspnea severity the last 12 hours: none    Patient is on opioids: assessed and bowels ok/no needed changes to plan of care today.           Review of Systems:     Besides above, a complete 10+ ROS was reviewed and is unremarkable           Medications:     I have reviewed this patient's medication profile and medications during this hospitalization.    Noted meds:    Citalopram 40mg every day  Methadone was 10mg TID PTA admission, then decreased to 5mg TID on 6/4 then increased to 7.5mg TID on 6/6  Narco 10/325mg q4hrs PRN - taking every 4 hours.   Xanax 0.5mg QID PRN  Tylenol  650mg q4hrs PRN             Physical Exam:   Vitals were reviewed  Temp: 98.2  F (36.8  C) Temp src: Oral BP: (!) 154/81 Pulse: 67   Resp: 15 SpO2: 94 % O2 Device: Nasal cannula Oxygen Delivery: 2 LPM  Gen: alert, sitting up in bed, appears stated age, in NAD  Eyes: Conjunctiva clear. Sclera anicteric .  HENT: NCAT; mucous membranes slightly dry, no clear mucosal lesions  Resp: no increased work of breathing, speaking full sentences  Msk: no gross deformity, no sarcopenia  Skin:  no jaundice  Neuro: A&O x 3; CN II-XII grossly intact;   Mental status/Psych: alert, engaged; sensorium intact             Data Reviewed:     Reviewed recent labs and pertinent imaging

## 2021-06-07 NOTE — PLAN OF CARE
Cancel: Attempted to see pt x2 today, pt in IR for PEG tube placement on first attempt and meeting with provider on second attempt. Session ultimately canceled, speech therapy will plan to follow up with pt tomorrow as he is able/appropriate.

## 2021-06-07 NOTE — PLAN OF CARE
"PT cancel: Attempted to see patient this morning however patient politely declined, stating he \"didn't have any problems with mobility\" and he was getting ready to go for his peg. Patient agreeable to PT coming to work with him prior to his discharge tomorrow.   "

## 2021-06-08 ENCOUNTER — HOME INFUSION (PRE-WILLOW HOME INFUSION) (OUTPATIENT)
Dept: PHARMACY | Facility: CLINIC | Age: 71
End: 2021-06-08

## 2021-06-08 ENCOUNTER — APPOINTMENT (OUTPATIENT)
Dept: RADIATION ONCOLOGY | Facility: CLINIC | Age: 71
End: 2021-06-08
Attending: RADIOLOGY
Payer: MEDICARE

## 2021-06-08 DIAGNOSIS — C09.9 SQUAMOUS CELL CARCINOMA OF TONSIL (H): Primary | ICD-10-CM

## 2021-06-08 LAB
ANION GAP SERPL CALCULATED.3IONS-SCNC: 8 MMOL/L (ref 3–14)
BASOPHILS # BLD AUTO: 0 10E9/L (ref 0–0.2)
BASOPHILS NFR BLD AUTO: 0 %
BUN SERPL-MCNC: 10 MG/DL (ref 7–30)
CALCIUM SERPL-MCNC: 8 MG/DL (ref 8.5–10.1)
CHLORIDE SERPL-SCNC: 105 MMOL/L (ref 94–109)
CO2 SERPL-SCNC: 25 MMOL/L (ref 20–32)
CREAT SERPL-MCNC: 1.14 MG/DL (ref 0.66–1.25)
DIFFERENTIAL METHOD BLD: ABNORMAL
EOSINOPHIL # BLD AUTO: 0 10E9/L (ref 0–0.7)
EOSINOPHIL NFR BLD AUTO: 0.5 %
ERYTHROCYTE [DISTWIDTH] IN BLOOD BY AUTOMATED COUNT: 14.9 % (ref 10–15)
GFR SERPL CREATININE-BSD FRML MDRD: 65 ML/MIN/{1.73_M2}
GLUCOSE SERPL-MCNC: 102 MG/DL (ref 70–99)
HCT VFR BLD AUTO: 29.6 % (ref 40–53)
HGB BLD-MCNC: 9.5 G/DL (ref 13.3–17.7)
IMM GRANULOCYTES # BLD: 0 10E9/L (ref 0–0.4)
IMM GRANULOCYTES NFR BLD: 0.9 %
LYMPHOCYTES # BLD AUTO: 0.2 10E9/L (ref 0.8–5.3)
LYMPHOCYTES NFR BLD AUTO: 7.3 %
MAGNESIUM SERPL-MCNC: 1.3 MG/DL (ref 1.6–2.3)
MCH RBC QN AUTO: 28.4 PG (ref 26.5–33)
MCHC RBC AUTO-ENTMCNC: 32.1 G/DL (ref 31.5–36.5)
MCV RBC AUTO: 88 FL (ref 78–100)
MONOCYTES # BLD AUTO: 0.4 10E9/L (ref 0–1.3)
MONOCYTES NFR BLD AUTO: 20 %
NEUTROPHILS # BLD AUTO: 1.6 10E9/L (ref 1.6–8.3)
NEUTROPHILS NFR BLD AUTO: 71.3 %
NRBC # BLD AUTO: 0 10*3/UL
NRBC BLD AUTO-RTO: 0 /100
PHOSPHATE SERPL-MCNC: 2.2 MG/DL (ref 2.5–4.5)
PHOSPHATE SERPL-MCNC: 2.3 MG/DL (ref 2.5–4.5)
PLATELET # BLD AUTO: 136 10E9/L (ref 150–450)
POTASSIUM SERPL-SCNC: 3.6 MMOL/L (ref 3.4–5.3)
RBC # BLD AUTO: 3.35 10E12/L (ref 4.4–5.9)
SODIUM SERPL-SCNC: 139 MMOL/L (ref 133–144)
WBC # BLD AUTO: 2.2 10E9/L (ref 4–11)

## 2021-06-08 PROCEDURE — 250N000013 HC RX MED GY IP 250 OP 250 PS 637: Performed by: INTERNAL MEDICINE

## 2021-06-08 PROCEDURE — 85025 COMPLETE CBC W/AUTO DIFF WBC: CPT | Performed by: STUDENT IN AN ORGANIZED HEALTH CARE EDUCATION/TRAINING PROGRAM

## 2021-06-08 PROCEDURE — 250N000013 HC RX MED GY IP 250 OP 250 PS 637: Performed by: STUDENT IN AN ORGANIZED HEALTH CARE EDUCATION/TRAINING PROGRAM

## 2021-06-08 PROCEDURE — 80048 BASIC METABOLIC PNL TOTAL CA: CPT | Performed by: STUDENT IN AN ORGANIZED HEALTH CARE EDUCATION/TRAINING PROGRAM

## 2021-06-08 PROCEDURE — 99233 SBSQ HOSP IP/OBS HIGH 50: CPT | Performed by: INTERNAL MEDICINE

## 2021-06-08 PROCEDURE — 250N000011 HC RX IP 250 OP 636: Performed by: STUDENT IN AN ORGANIZED HEALTH CARE EDUCATION/TRAINING PROGRAM

## 2021-06-08 PROCEDURE — 99233 SBSQ HOSP IP/OBS HIGH 50: CPT | Performed by: STUDENT IN AN ORGANIZED HEALTH CARE EDUCATION/TRAINING PROGRAM

## 2021-06-08 PROCEDURE — 36415 COLL VENOUS BLD VENIPUNCTURE: CPT | Performed by: STUDENT IN AN ORGANIZED HEALTH CARE EDUCATION/TRAINING PROGRAM

## 2021-06-08 PROCEDURE — 84100 ASSAY OF PHOSPHORUS: CPT | Performed by: INTERNAL MEDICINE

## 2021-06-08 PROCEDURE — 77386 HC IMRT TREATMENT DELIVERY, COMPLEX: CPT | Performed by: RADIOLOGY

## 2021-06-08 PROCEDURE — 258N000003 HC RX IP 258 OP 636: Performed by: STUDENT IN AN ORGANIZED HEALTH CARE EDUCATION/TRAINING PROGRAM

## 2021-06-08 PROCEDURE — 84100 ASSAY OF PHOSPHORUS: CPT | Performed by: STUDENT IN AN ORGANIZED HEALTH CARE EDUCATION/TRAINING PROGRAM

## 2021-06-08 PROCEDURE — 120N000002 HC R&B MED SURG/OB UMMC

## 2021-06-08 PROCEDURE — 250N000011 HC RX IP 250 OP 636: Performed by: INTERNAL MEDICINE

## 2021-06-08 PROCEDURE — 36592 COLLECT BLOOD FROM PICC: CPT | Performed by: INTERNAL MEDICINE

## 2021-06-08 PROCEDURE — 83735 ASSAY OF MAGNESIUM: CPT | Performed by: STUDENT IN AN ORGANIZED HEALTH CARE EDUCATION/TRAINING PROGRAM

## 2021-06-08 RX ORDER — MAGNESIUM SULFATE HEPTAHYDRATE 40 MG/ML
4 INJECTION, SOLUTION INTRAVENOUS ONCE
Status: COMPLETED | OUTPATIENT
Start: 2021-06-08 | End: 2021-06-08

## 2021-06-08 RX ADMIN — POTASSIUM & SODIUM PHOSPHATES POWDER PACK 280-160-250 MG 1 PACKET: 280-160-250 PACK at 08:48

## 2021-06-08 RX ADMIN — HYDROCODONE BITARTRATE AND ACETAMINOPHEN 1 TABLET: 10; 325 TABLET ORAL at 18:09

## 2021-06-08 RX ADMIN — Medication 1 PACKET: at 19:57

## 2021-06-08 RX ADMIN — CITALOPRAM HYDROBROMIDE 40 MG: 10 SOLUTION ORAL at 08:47

## 2021-06-08 RX ADMIN — ALPRAZOLAM 0.5 MG: 0.25 TABLET ORAL at 18:58

## 2021-06-08 RX ADMIN — POTASSIUM & SODIUM PHOSPHATES POWDER PACK 280-160-250 MG 1 PACKET: 280-160-250 PACK at 14:06

## 2021-06-08 RX ADMIN — OMEPRAZOLE 40 MG: KIT at 08:48

## 2021-06-08 RX ADMIN — METHADONE HYDROCHLORIDE 7.5 MG: 5 SOLUTION ORAL at 05:58

## 2021-06-08 RX ADMIN — POTASSIUM & SODIUM PHOSPHATES POWDER PACK 280-160-250 MG 1 PACKET: 280-160-250 PACK at 19:57

## 2021-06-08 RX ADMIN — Medication 25 MG: at 08:48

## 2021-06-08 RX ADMIN — MAGNESIUM SULFATE IN WATER 4 G: 40 INJECTION, SOLUTION INTRAVENOUS at 14:06

## 2021-06-08 RX ADMIN — HYDROCODONE BITARTRATE AND ACETAMINOPHEN 1 TABLET: 10; 325 TABLET ORAL at 00:37

## 2021-06-08 RX ADMIN — HYDROCODONE BITARTRATE AND ACETAMINOPHEN 1 TABLET: 10; 325 TABLET ORAL at 08:47

## 2021-06-08 RX ADMIN — ONDANSETRON 4 MG: 2 INJECTION INTRAMUSCULAR; INTRAVENOUS at 06:03

## 2021-06-08 RX ADMIN — Medication 25 MG: at 19:57

## 2021-06-08 RX ADMIN — METHADONE HYDROCHLORIDE 7.5 MG: 5 SOLUTION ORAL at 14:05

## 2021-06-08 RX ADMIN — POTASSIUM CHLORIDE 40 MEQ: 40 SOLUTION ORAL at 00:38

## 2021-06-08 RX ADMIN — SILVER SULFADIAZINE: 10 CREAM TOPICAL at 10:59

## 2021-06-08 RX ADMIN — Medication 1 PACKET: at 08:50

## 2021-06-08 RX ADMIN — ACETAMINOPHEN 650 MG: 325 SOLUTION ORAL at 03:13

## 2021-06-08 RX ADMIN — ALPRAZOLAM 0.5 MG: 0.25 TABLET ORAL at 10:27

## 2021-06-08 RX ADMIN — ACETAMINOPHEN 650 MG: 325 SOLUTION ORAL at 14:05

## 2021-06-08 RX ADMIN — ROSUVASTATIN CALCIUM 40 MG: 40 TABLET, COATED ORAL at 19:57

## 2021-06-08 RX ADMIN — HYDROXYZINE HYDROCHLORIDE 50 MG: 25 TABLET, FILM COATED ORAL at 21:38

## 2021-06-08 RX ADMIN — ACETAMINOPHEN 650 MG: 325 SOLUTION ORAL at 19:57

## 2021-06-08 RX ADMIN — ALPRAZOLAM 0.5 MG: 0.25 TABLET ORAL at 00:37

## 2021-06-08 RX ADMIN — METHADONE HYDROCHLORIDE 7.5 MG: 5 SOLUTION ORAL at 21:29

## 2021-06-08 RX ADMIN — Medication 15 ML: at 08:48

## 2021-06-08 RX ADMIN — CETIRIZINE HYDROCHLORIDE 10 MG: 5 TABLET ORAL at 21:38

## 2021-06-08 RX ADMIN — ACETAMINOPHEN 650 MG: 325 SOLUTION ORAL at 08:48

## 2021-06-08 RX ADMIN — DEXTROSE AND SODIUM CHLORIDE: 5; 900 INJECTION, SOLUTION INTRAVENOUS at 18:18

## 2021-06-08 RX ADMIN — DEXTROSE AND SODIUM CHLORIDE: 5; 900 INJECTION, SOLUTION INTRAVENOUS at 01:04

## 2021-06-08 ASSESSMENT — ACTIVITIES OF DAILY LIVING (ADL)
ADLS_ACUITY_SCORE: 14

## 2021-06-08 NOTE — PHARMACY-CONSULT NOTE
Pharmacy Tube Feeding Consult    Medication reviewed for administration by feeding tube and for potential food/drug interactions.    Recommendation: No changes are needed at this time.     Pharmacy will continue to follow as new medications are ordered.     Lanny Moffett, EmmanuelD

## 2021-06-08 NOTE — PLAN OF CARE
Care assumed: [id number]   Vitals  Pain  BP (!) 147/82 (BP Location: Right arm)   Pulse 60   Temp 98.3  F (36.8  C) (Oral)   Resp 18   Wt 98.1 kg (216 lb 3.2 oz)   SpO2 95% RA   BMI 32.39 kg/m        Pain around Peg tube; pain meds given    Activity  Ind.    Neuro/Mood  A&Ox 4     Denies numbness and tingling    Cardiac  No signs of acute distress    Respiratory  No signs of acute respiratory distress    GI/  Voids spontaneously      Diet/ Nutrition  Tolerating Clear  diet   Tube feedings at 10mL/hr. Progress by 10mL/hr every 8 hours. Changed to 20mL/hr at 0330 next at 1130    Skin, Wounds, LDAs Skin mostly intact ex peg tube site   R PIV infusing D5 NS 0.9 at 75mL/hr      Plan of Care  Other notes Continue to monitor and update team with changes   Slept comfortably; One episode of nausea possibly due to tube feedings. Zofran given  Calls appropriately

## 2021-06-08 NOTE — PROGRESS NOTES
Hematology / Oncology  Daily Progress Note   Date of Service: 06/08/2021  Patient: Quan Murphy  MRN: [id number]  Admission Date: 6/2/2021  Hospital Day # 6  Cancer Diagnosis: SCC of L tonsil  Primary Outpatient Oncologist: Dr. Schrader  Current Treatment Plan: Concurrent chemoradiation, completed weekly cisplatin      Summary & Recommendations:   Oncology team was asked by patient's primary oncologist to continue to follow patient for continuity of care. Patient continues to do well. S/p PEG placement yesterday. TF currently at 30 mL/hr. Pain is well-controlled; however, patient is drowsy during our visit. No new recommendations today from oncologic perspective. Tentative plan to discharge tomorrow. Oncologic follow up with [email or web address] on 6/21, which was closest available.      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 He was seen in clinic on 6/2/2021 and was found to be doing extremely poorly with weakness, severely limited PO intake as well as difficulty managing secretions. His last cisplatin was on 5/27. He had been referred but that had not been scheduled yet for a PEG as outpatient so he was admitted for poor PO intake, failure to thrive and placement of feeding tube. In early morning 6/4, patient suffered respiratory arrest secondary to aspiration vs narcotic respiratory depression, now improved.      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  PET/CT showed 2.7 x 2.2 x 2.7 cm L palatine tonsil mass (SUV 24), extension to oral cavity, 3.4 x 2.0 cm L level 4 node invading SCM, 1.8  x 1.8 cm L level 2A/3 node (SUV 7.9) w/ECS, 1.4 x 0.9 cm R level 2A node (SUV 6.7). He began concurrent chemoradiation with weekly cisplatin on 4/28. He presented for his clinic visit on 6/2/2021 with very limited PO intake and continued weight loss and failure to thrive. He was admitted to the hospital to get IV fluids and undergo PEG tube placement for initiation of tube feeding.  - Continue XRT while inpatient, to be completed 6/14.  - Patient missed last dose of weekly cisplatin due to admission. After discussion with Dr. Schrader, there is no need to make up this dose. He has therefore completed his chemotherapy.   - PEG placement 6/9 and TF are currently being titrated. Monitor for refeeding syndrome. Appreciate RD input.   - IVF and electrolyte replacement per primary team.   - PT/OT consult.  - Patient follows with Dr. Serrano with palliative outpatient.   - [facility] visit on 6/21     #Pancytopenia  Likely secondary to chemotherapy and/or chronic diease.   - Monitor CBC  - Patient is not nearing transfusion parameters     #Recent h/o respiratory arrest  #AHRF, resolved  In early morning 6/4, patient suffered respiratory arrest. Code status DNR/DNI. Spontaneous return of breathing after respirations with BVM and Narcan administration. Suspect etiology of aspiration vs narcotic respiratory depression. Patient now on low flow nasal cannula.   - Management per medicine team.  - Patient on 1L NC for comfort. Will not discharge home on oxygen.   - Appreciate inpatient palliative team for their recommendations on pain regimen, given concern for narcotic/BZD-related respiratory depression.     Patient was seen and plan of care was discussed with attending physician Dr. Reece.    Thank you for the opportunity to partake in this patient's plan of care. Please do not hesitate to page with questions.    Brittni Hill PA-C   Hematology/Oncology   Pager:  [phone]  ___________________________________________________________________    Subjective & Interval History:    No acute events noted overnight. Patient is doing well today. He feels that he has found the \"sweet spot\" of pain control, where is pain is well-controlled but he can still breathe. He is somnolent during exam but just received dose of pain med. Easily wakes to voice, but nods off during conversation. States that PEG placement went well. Wife educated on TF and will administer at home. Patient is ready to discharge. Several questions were answered regarding outpatient appointments and prescriptions. Plan per patient and wife to discharge tomorrow.     Physical Exam:    Blood pressure (!) 145/81, pulse 73, temperature 98.3  F (36.8  C), temperature source Oral, resp. rate 18, weight 98.1 kg (216 lb 3.2 oz), SpO2 91 %.    General: pleasant, lying in bed, no acute distress, somnolent  HEENT: sclera anicteric, EOMI, dry mucous membranes  Neck: supple, normal ROM  Resp: normal respiratory effort on 1L NC for comfort  GI: soft, PEG tube insertion site c/d/i  MSK: warm and well-perfused, normal tone  Skin: no rashes on limited exam, no jaundice  Neuro: Alert and interactive, moves all extremities equally, no focal deficits    Labs & Studies: I personally reviewed the following studies:  ROUTINE LABS (Last four results):  CMP  Recent Labs   Lab 06/08/21  0840 06/08/21  0533 06/07/21  2052 06/07/21  0455 06/06/21  0544 06/05/21  0658 06/04/21  0624 06/02/21  2152 06/02/21  0806   NA  --  139  --  136 138 135 138 137 139   POTASSIUM  --  3.6 3.3* 3.2* 3.9 3.9 4.6 3.7 4.1   CHLORIDE  --  105  --  101 100 98 102 103 101   CO2  --  25  --  33* 34* 33* 31 31 30   ANIONGAP  --  8  --  3 4 3 4 3 7   GLC  --  102*  --  94 69* 96 131* 93 100*   BUN  --  10  --  11 15 21 21 24 29   CR  --  1.14  --  1.19 1.25 1.35* 1.29* 1.22 1.58*   GFRESTIMATED  --  65  --  62 58* 53* 56* 60* 44*   GFRESTBLACK  --  75  --  71 67 61 65 69 " 51*   LATRELL  --  8.0*  --  7.8* 8.2* 8.2* 8.8 8.0* 8.5   MAG  --  1.3*  --   --  1.5*  --  1.8 1.5* 1.2*   PHOS 2.3* 2.2*  --   --   --   --   --  2.5  --    PROTTOTAL  --   --   --   --   --   --  6.0* 5.8* 6.3*   ALBUMIN  --   --   --   --   --   --  2.3* 2.4* 2.6*   BILITOTAL  --   --   --   --   --   --  0.4 0.5 0.6   ALKPHOS  --   --   --   --   --   --  60 49 54   AST  --   --   --   --   --   --  34 24 23   ALT  --   --   --   --   --   --  29 24 23     CBC  Recent Labs   Lab 06/08/21  0533 06/07/21  0455 06/06/21  0544 06/05/21  0658   WBC 2.2* 1.7* 1.9* 2.2*   RBC 3.35* 3.38* 3.44* 3.25*   HGB 9.5* 9.5* 9.7* 9.3*   HCT 29.6* 30.1* 30.9* 29.1*   MCV 88 89 90 90   MCH 28.4 28.1 28.2 28.6   MCHC 32.1 31.6 31.4* 32.0   RDW 14.9 14.6 14.7 14.8   * 116* 114* 103*     INR  Recent Labs   Lab 06/07/21  0953   INR 1.49*     Medications list for reference:  Current Facility-Administered Medications   Medication     acetaminophen (TYLENOL) solution 650 mg     acetaminophen (TYLENOL) solution 650 mg     ALPRAZolam (XANAX) tablet 0.5 mg     cetirizine (zyrTEC) tablet 5-10 mg     citalopram (celeXA) solution 40 mg     dextrose 10% infusion     dextrose 5% and 0.9% NaCl infusion     HOLD: Metformin and metformin containing medications if patient received IV contrast     HYDROcodone-acetaminophen (NORCO)  MG per tablet 1 tablet     hydrOXYzine (ATARAX) tablet 50 mg     lidocaine (LMX4) cream     lidocaine 1 % 0.1-1 mL     magic mouthwash suspension (diphenhydrAMINE, lidocaine, aluminum-magnesium & simethicone)     magnesium sulfate 4 g in 100 mL sterile water (premade)     melatonin tablet 3 mg     methadone (DOLPHINE) solution 7.5 mg     metoprolol (LOPRESSOR) suspension 25 mg     mineral oil-hydrophilic petrolatum (AQUAPHOR)     multivitamins w/minerals (CERTAVITE) liquid 15 mL     naloxone (NARCAN) injection 0.2 mg    Or     naloxone (NARCAN) injection 0.4 mg    Or     naloxone (NARCAN) injection 0.2 mg    Or      naloxone (NARCAN) injection 0.4 mg     omeprazole (FIRST-OMEPRAZOLE) suspension 40 mg     ondansetron (ZOFRAN-ODT) ODT tab 4 mg    Or     ondansetron (ZOFRAN) injection 4 mg     polyethylene glycol (MIRALAX) Packet 17 g     potassium & sodium phosphates (NEUTRA-PHOS) Packet 1 packet     protein modular (PROSOURCE TF) 1 packet     rosuvastatin (CRESTOR) tablet 40 mg     senna-docusate (SENOKOT-S/PERICOLACE) 8.6-50 MG per tablet 1 tablet    Or     senna-docusate (SENOKOT-S/PERICOLACE) 8.6-50 MG per tablet 2 tablet     silver sulfADIAZINE (SILVADENE) 1 % cream     sodium chloride (PF) 0.9% PF flush 3 mL     sodium chloride (PF) 0.9% PF flush 3 mL

## 2021-06-08 NOTE — PLAN OF CARE
Pt A&O x4, VSS on RA. On 1L NC for comfort. Independent in room. Pt had high anxiety this AM after coming back from radiation and wanting to discharge. Talked with team about discharge planning and decided to wait and reevaluate discharge tomorrow due to decreases in electrolytes this AM for possible refeeding syndrome and monitor new TF increases. Given Xanax x1 with good relief from anxiety. Also given norco x2 for neck pain. Wife in room and very attentive to patient needs. Peg tube running TF at 30 mL/hr. Can be turned up q8h until goal rate of 50 mL/hr is reached. TF increase to 40 mL/hr at 1900.  Mg and phos. Replaced. Recheck in AM. All meds given through Peg tube. Moved to private room this evening. Pt anxiety appears much more managed. Tolerating clear liquid diet. PIV running D5NS at 75 mL/hr. Denies nausea. Slept for good part of day. Expressed not being able to sleep well for last couple nights. Possible discharge tomorrow. Will continue to monitor.

## 2021-06-08 NOTE — PROGRESS NOTES
[facility]     6/8/21:  Received referral from Eve Oshea RNCC for Enteral feedings.  Benefits verified.  Pt has Medicare and BCBS supplement plans.  He meets Medicare criteria for enteral tube feeds and is covered 100%, anticipated AVTAR of 90 days or more must be documented in patient s medical chart in order for Medicare to cover. Nursing is only covered if patient is homebound if not cost is $90.00 per visit if hospitals bills for it.  Spoke with patient's spouse Alessandra to review home infusion services, review benefits and offer choice of providers.  They would like to use hospitals for home infusion.  New is hoping to discharge as soon as tomorrow and will be going home on enteral feeds.  He has never done home enteral therapy before however his spouse is a retired RN and will be able to assist.  Provided patient and spouse with information about hospitals services.  Inquired about administration method preferred (gravity vs syringe) and assessed supply needs.  Patient and spouse would prefer to do syringe bolus feeds at home.  Informed them about supplies and delivery of supplies, storage of formula, and 24/7 availability of I staff while on enteral therapy.   Spouse verbalized understanding of all information given.  She is willing and able to learn and manage home enteral therapy  Questions answered.  Patient will not have home care nursing after discharge.  I Liaison will follow along to assist with discharge home with infusion needs.  Plan for [facility] liaison to meet with spouse at bedside tomorrow to teach syringe bolus feeding method, if patient is able to convert to bolus feeds then.     Thank you for the referral.     6/9/21:  New is discharging today and going home on enteral therapy.   Spouse needs teaching on syringe bolus feeds.  Met with patient and spouse Alessandra at bedside and provided instruction in enteral administration using syringe bolus method.  Attached y-extension to feeding tube and secured  tube with flexitrac securement device.  Reviewed steps for syringe bolus feeds, prosource administration, and water flushes with spouse.  Spouse is a retired RN and familiar with enteral feedings.  Patient quite anxious to leave during visit and spouse did not want to give a bolus feed at this time due to patient's anxiety.  Reviewed RD recs for initiating bolus feeds at home.  I reinforced water flushing for both patency and to maintain hydration.  Patient and spouse will leave hospital with some formula and supplies.  Plan for [facility] to deliver home supplies by 6pm this evening.    Spouse verbalized understanding of information given.  She demonstrated very good technique with set up and verbalized good understanding of process. Stated she feels comfortable with administering patient's feeding independently at home.  Encouraged her to call [facility] 24/7 triage line with any questions or concerns.  Patient is ready for discharge from [facility] perspective.      RUTH Cruz  [facility] Nurse Liaison   [email or web address]  Cell: [phone] M-F  [facility] Main: [phone]

## 2021-06-08 NOTE — PROGRESS NOTES
[id number]  Pt is alert and oriented x4. VSS. Saturating well on 1L O2. Has chronic throat and neck pain. Also has mild pain on PEG insertion site. Scheduled tylenol and methadone given and provided moderate relief. Denies SOB, chest pain, nausea and vomiting. PEG tube dressing dry and intact.Tolerating TF at 10ml/hr, no residual aspirated. TF to increase 10ml every 8 hours, next due at 0230. Up independently and voiding freely. No BM. Still needs potassium replacement and recheck.
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
## 2021-06-09 ENCOUNTER — APPOINTMENT (OUTPATIENT)
Dept: RADIATION ONCOLOGY | Facility: CLINIC | Age: 71
End: 2021-06-09
Attending: RADIOLOGY
Payer: MEDICARE

## 2021-06-09 ENCOUNTER — HOME INFUSION (PRE-WILLOW HOME INFUSION) (OUTPATIENT)
Dept: PHARMACY | Facility: CLINIC | Age: 71
End: 2021-06-09

## 2021-06-09 VITALS
SYSTOLIC BLOOD PRESSURE: 151 MMHG | TEMPERATURE: 97.2 F | HEART RATE: 68 BPM | OXYGEN SATURATION: 93 % | DIASTOLIC BLOOD PRESSURE: 81 MMHG | WEIGHT: 215.9 LBS | BODY MASS INDEX: 32.35 KG/M2 | RESPIRATION RATE: 16 BRPM

## 2021-06-09 DIAGNOSIS — E43 SEVERE PROTEIN-CALORIE MALNUTRITION (H): ICD-10-CM

## 2021-06-09 DIAGNOSIS — E83.42 HYPOMAGNESEMIA: ICD-10-CM

## 2021-06-09 DIAGNOSIS — C09.9 SQUAMOUS CELL CARCINOMA OF LEFT TONSIL (H): Primary | ICD-10-CM

## 2021-06-09 DIAGNOSIS — Z13.29 SCREENING FOR HYPOTHYROIDISM: ICD-10-CM

## 2021-06-09 LAB
ANION GAP SERPL CALCULATED.3IONS-SCNC: 4 MMOL/L (ref 3–14)
BUN SERPL-MCNC: 10 MG/DL (ref 7–30)
CALCIUM SERPL-MCNC: 8.1 MG/DL (ref 8.5–10.1)
CHLORIDE SERPL-SCNC: 103 MMOL/L (ref 94–109)
CO2 SERPL-SCNC: 31 MMOL/L (ref 20–32)
CREAT SERPL-MCNC: 1.13 MG/DL (ref 0.66–1.25)
GFR SERPL CREATININE-BSD FRML MDRD: 66 ML/MIN/{1.73_M2}
GLUCOSE SERPL-MCNC: 96 MG/DL (ref 70–99)
MAGNESIUM SERPL-MCNC: 1.8 MG/DL (ref 1.6–2.3)
PHOSPHATE SERPL-MCNC: 3 MG/DL (ref 2.5–4.5)
POTASSIUM SERPL-SCNC: 3.5 MMOL/L (ref 3.4–5.3)
SODIUM SERPL-SCNC: 138 MMOL/L (ref 133–144)

## 2021-06-09 PROCEDURE — 36415 COLL VENOUS BLD VENIPUNCTURE: CPT | Performed by: STUDENT IN AN ORGANIZED HEALTH CARE EDUCATION/TRAINING PROGRAM

## 2021-06-09 PROCEDURE — 83735 ASSAY OF MAGNESIUM: CPT | Performed by: STUDENT IN AN ORGANIZED HEALTH CARE EDUCATION/TRAINING PROGRAM

## 2021-06-09 PROCEDURE — 250N000013 HC RX MED GY IP 250 OP 250 PS 637: Performed by: STUDENT IN AN ORGANIZED HEALTH CARE EDUCATION/TRAINING PROGRAM

## 2021-06-09 PROCEDURE — 80048 BASIC METABOLIC PNL TOTAL CA: CPT | Performed by: STUDENT IN AN ORGANIZED HEALTH CARE EDUCATION/TRAINING PROGRAM

## 2021-06-09 PROCEDURE — 250N000013 HC RX MED GY IP 250 OP 250 PS 637: Performed by: INTERNAL MEDICINE

## 2021-06-09 PROCEDURE — 77386 HC IMRT TREATMENT DELIVERY, COMPLEX: CPT | Performed by: RADIOLOGY

## 2021-06-09 PROCEDURE — 77336 RADIATION PHYSICS CONSULT: CPT | Performed by: RADIOLOGY

## 2021-06-09 PROCEDURE — 99239 HOSP IP/OBS DSCHRG MGMT >30: CPT | Performed by: INTERNAL MEDICINE

## 2021-06-09 PROCEDURE — 84100 ASSAY OF PHOSPHORUS: CPT | Performed by: STUDENT IN AN ORGANIZED HEALTH CARE EDUCATION/TRAINING PROGRAM

## 2021-06-09 RX ORDER — ALPRAZOLAM 0.5 MG
0.5 TABLET ORAL 3 TIMES DAILY PRN
Refills: 0
Start: 2021-06-09 | End: 2021-07-02

## 2021-06-09 RX ORDER — METHADONE HYDROCHLORIDE 5 MG/1
7.5 TABLET ORAL 3 TIMES DAILY
Refills: 0
Start: 2021-06-09 | End: 2021-06-21

## 2021-06-09 RX ORDER — AMINO AC/PROTEIN HYDR/WHEY PRO 10G-100/30
1 LIQUID (ML) ORAL 2 TIMES DAILY
Qty: 60 PACKET | Refills: 0 | Status: SHIPPED | OUTPATIENT
Start: 2021-06-09 | End: 2021-07-09

## 2021-06-09 RX ADMIN — HYDROCODONE BITARTRATE AND ACETAMINOPHEN 1 TABLET: 10; 325 TABLET ORAL at 06:44

## 2021-06-09 RX ADMIN — HYDROXYZINE HYDROCHLORIDE 50 MG: 25 TABLET, FILM COATED ORAL at 05:21

## 2021-06-09 RX ADMIN — ACETAMINOPHEN 650 MG: 325 SOLUTION ORAL at 08:14

## 2021-06-09 RX ADMIN — ALPRAZOLAM 0.5 MG: 0.25 TABLET ORAL at 02:42

## 2021-06-09 RX ADMIN — Medication 15 ML: at 08:14

## 2021-06-09 RX ADMIN — SILVER SULFADIAZINE: 10 CREAM TOPICAL at 10:05

## 2021-06-09 RX ADMIN — ALPRAZOLAM 0.5 MG: 0.25 TABLET ORAL at 11:06

## 2021-06-09 RX ADMIN — Medication 25 MG: at 08:14

## 2021-06-09 RX ADMIN — OMEPRAZOLE 40 MG: KIT at 08:14

## 2021-06-09 RX ADMIN — Medication 1 PACKET: at 08:15

## 2021-06-09 RX ADMIN — HYDROCODONE BITARTRATE AND ACETAMINOPHEN 1 TABLET: 10; 325 TABLET ORAL at 11:08

## 2021-06-09 RX ADMIN — CETIRIZINE HYDROCHLORIDE 10 MG: 5 TABLET ORAL at 06:58

## 2021-06-09 RX ADMIN — HYDROCODONE BITARTRATE AND ACETAMINOPHEN 1 TABLET: 10; 325 TABLET ORAL at 02:42

## 2021-06-09 RX ADMIN — METHADONE HYDROCHLORIDE 7.5 MG: 5 SOLUTION ORAL at 05:21

## 2021-06-09 RX ADMIN — CITALOPRAM HYDROBROMIDE 40 MG: 10 SOLUTION ORAL at 08:14

## 2021-06-09 ASSESSMENT — ACTIVITIES OF DAILY LIVING (ADL)
ADLS_ACUITY_SCORE: 14

## 2021-06-09 NOTE — PLAN OF CARE
Night Shift Note  Per MD Expected discharge: 1-2 days, recommended to prior living arrangement once no further concern for refeeding syndrome.  Tolerating tube feeding, goal of 50 ml/hour reached at 0300. Gastric tube placed June 7, 2021. Clear liquid diet. Medications via tube. Complained of pain and anxiety, xanax and NORCO given. Continuous pulse oximetry in use to monitor respiratory status 2/2 respiratory arrest related to narcotic and benzodiazepine usage. Check lab result for magnesium and phosphorus both were replaced yesterday. BMP posted Mg is 1.8 and phos is 3.0. Continue with current plan of nursing care, and update MD with concerns.

## 2021-06-09 NOTE — PROGRESS NOTES
Nutrition Brief - Contacted by MD to start Bolus TF regimen, plan for patient to discharge today,    Refeeding labs: K+/Mg++/phos being replaced, normal limits today. Ongoing potential for refeeding syndrome   EN: Jevity 1.5 @ 50 ml/hr, reached goal rate this morning via Gastrostomy tube.     Interventions:  Ordered Enteral Nutrition recommendations for Bolus TFs:       Formula: Jevity 1.5 Chad  Volume: 5 cartons/day (1200 mL, 912 ml free water)  Provisions: 1800 kcal (23 kcal/kg + pending PO), 76 gm protein (1.0 g/kg), 258 gm CHO, 25 gm fiber     Suggested Tube feeding schedule via gravity feedings    Day 1: Stop continuous TF for 1-2 hrs to let stomach empty prior to starting gravity feeding. Begin first gravity feeding with 1/2 carton formula (120 ml ), x 4 feedings.   --Spread 3-4 hours apart      Day 2: 1 carton formula x 4 feedings,   --Spread each feeding by 3-4 hours apart to allow stomach empty      Day 3:  If tolerated, Then increase to goal volume of  1 1/2 cartons (360 ml) formula x 2 feedings; plus add'l 1 carton (240 ml) as 3rd and 4th feeding , Total of 4 feeding per day.  --Spread each feeding by 3-4 hours apart to allow stomach empty        **Flush with 120 mL water before and after each feeding if TF to provide full hydration.         **Do not give feedings at night while patient asleep (during the day only).    ------------------------------------------------------------------------------------------------------------------  -Modules: Prosource protein supplement (provided by [employer]) @ 1 Packet per feeding tube Twice per day (Infuse via syringe down feeding tube). Flush feeding tube with 15-30 mL of water before and after administration.  Do not mix with other medications.    ---TF+Prosource=> 1880 kcal (24 kcal/kg + Pending PO) and 98 gm protein/day (1.3 gm/kg).      ------------------------------------------------------------------------------------------------------------------    ** IF patient's weight declines, Recommend increasing to 6 cartons/day (1 1/2 carton x 4 feedings per day      ** IF patient to have abdominal bloating and diarrhea with Jevity 1.5, switch formula to a non fiber base regimen of Osmolite 1.5 @  5 cans per day. Same regimen as above.        Raad Gray RD/GRUPO  Pager [phone]

## 2021-06-09 NOTE — PLAN OF CARE
Assumed cares 0700 until discharge around 1145.     BP (!) 151/81 (BP Location: Left arm)   Pulse 68   Temp 97.2  F (36.2  C) (Oral)   Resp 16   Wt 97.9 kg (215 lb 14.4 oz)   SpO2 93%   BMI 32.35 kg/m       Pain: Reported in throat and near PEG site, admin norco x 1.  Neuro: Anxious, admin xanax x 1.  Respiratory: Lung sounds diminished on RA.   Cardiac/Neurovascular: HR and pulses WDL. Trace LE edema.  GI/: Bowel sounds hyperactive. Abd taut/slightly distended and is tender in LUQ near PEG. PEG site slightly reddened. Adequate UOP.  Nutrition: Was on cont TFs, changed over to bolus this shift. Pt's spouse received TF education from [facility] infusion. On clears PO.  Activity: Up SBA.  Skin: Neck reddened and peeling, applied scheduled cream. PEG site also slightly reddened as noted above.  Events this shift: All lytes WDL. Radiation this shift. Pt then medically ready for discharge.     Discharged to: Home  Transportation: Spouse  Time: 1145  Prescriptions: Sent to outside pharmacy.  Belongings: Sent with pt.  PIV/Access: Removed.  Paperwork: Reviewed AVS with pt and pt's spouse, both verbalized understanding. Pt and spouse aware of necessary follow up, bolus TFs and TF admin, and of medication changes.       Shelley Alcaraz RN on 6/9/2021 at 11:45 AM

## 2021-06-09 NOTE — PLAN OF CARE
PT 5B: cancel    Pt discharged prior to PT session this date.    Physical Therapy Discharge Summary    Reason for therapy discharge:    Discharged to home.    Progress towards therapy goal(s). See goals on Care Plan in [facility] electronic health record for goal details.  Goals partially met.  Barriers to achieving goals:   discharge from facility.    Therapy recommendation(s):    Continue home exercise program.

## 2021-06-09 NOTE — PROGRESS NOTES
Care Management Discharge Note    Discharge Date: 06/09/21       Discharge Disposition: Home, Home Infusion    Discharge Services: None    Discharge DME: None    Discharge Transportation: family or friend will provide    Private pay costs discussed: Not applicable  Patient/family educated on Medicare website which has current facility and service quality ratings: (N/A)    Education Provided on the Discharge Plan:  Yes  Persons Notified of Discharge Plans: Patient and wife  Patient/Family in Agreement with the Plan: yes    Handoff Referral Completed: Yes    Additional Information:  Patient is a 70 year old male with PMH of 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   Patient discharged today. Patient discharged with [facility] established enteral feds. Wife had bedside teaching with I liaison. Patient did not have coverage for home nursing and did not have needs for PT/OT.  Patient will have [place] [facility] [place], while there receiving radiation.  Patients wife said PCP is on leave, RNCC encouraged her to still see PCP covering for primary PCP patient has needs and should be seen.       Juliana Jama, RN    Juliana Jama RN, BSN  5B RN Care Coordinator  [phone] phone  [phone] pager    Weekend or Holiday Care Coordinator [phone] pager  Job Code [id number]

## 2021-06-09 NOTE — PLAN OF CARE
D/I: Pt A&O x4, Vitally stable on 1L NC for comfort, but sats in mid 90s on RA. Up with SBA to the bathroom. Atarax and Zyrtec given this evening. PEG tube running TF at 40 mL/hr. Increase q8h until goal rate of 50 mL/hr. Last increase @ 1900 and next TF increase to goal rate @0300.  Mg and phos recheck in AM. All meds given via PEG tube. Tolerating clear liquid diet. D5NS infusing via L PIV @ 75 mL/hr. Denies nausea. Sleeping in between cares.   Plan: Possible discharge tomorrow. Will continue to monitor. Pt would like his Zyrtec in the morning.

## 2021-06-09 NOTE — PLAN OF CARE
SLP: Attempted to see pt for dysphagia tx, however pt was preparing for discharge. Encouraged ongoing completion of oropharyngeal exercises. Pt has OP ST follow up scheduled 6/16 at ENT clinic.     Speech Language Therapy Discharge Summary    Reason for therapy discharge:    Discharged to home with outpatient therapy.    Progress towards therapy goal(s). See goals on Care Plan in [facility] electronic health record for goal details.  Goals not met.  Barriers to achieving goals:   discharge from facility.    Therapy recommendation(s):    Continued therapy is recommended.  Rationale/Recommendations:  Continued ST targeting dysphagia/oropharyngeal exercises during chemoXRT.    Recommend pt continue regular textures and thin liquids. Pt should be fully upright and alert for all PO, take small sips/bites, slow pacing, and utilize extra/hard swallow strategy.
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
## 2021-06-10 ENCOUNTER — TELEPHONE (OUTPATIENT)
Dept: FAMILY MEDICINE | Facility: CLINIC | Age: 71
End: 2021-06-10

## 2021-06-10 ENCOUNTER — PATIENT OUTREACH (OUTPATIENT)
Dept: NURSING | Facility: CLINIC | Age: 71
End: 2021-06-10
Payer: MEDICARE

## 2021-06-10 ENCOUNTER — OFFICE VISIT (OUTPATIENT)
Dept: RADIATION ONCOLOGY | Facility: CLINIC | Age: 71
End: 2021-06-10
Attending: RADIOLOGY
Payer: MEDICARE

## 2021-06-10 VITALS
BODY MASS INDEX: 30.72 KG/M2 | WEIGHT: 205 LBS | SYSTOLIC BLOOD PRESSURE: 143 MMHG | DIASTOLIC BLOOD PRESSURE: 86 MMHG | HEART RATE: 84 BPM

## 2021-06-10 DIAGNOSIS — R62.7 FAILURE TO THRIVE IN ADULT: Primary | ICD-10-CM

## 2021-06-10 DIAGNOSIS — C09.9 SQUAMOUS CELL CARCINOMA OF LEFT TONSIL (H): Primary | ICD-10-CM

## 2021-06-10 LAB
BACTERIA SPEC CULT: NO GROWTH
SPECIMEN SOURCE: NORMAL

## 2021-06-10 PROCEDURE — 77386 HC IMRT TREATMENT DELIVERY, COMPLEX: CPT | Performed by: RADIOLOGY

## 2021-06-10 ASSESSMENT — ACTIVITIES OF DAILY LIVING (ADL): DEPENDENT_IADLS:: TRANSPORTATION

## 2021-06-10 NOTE — PROGRESS NOTES
Clinic Care Coordination Contact    Clinic Care Coordination Contact  OUTREACH    Referral Information:  Referral Source: IP Report    Primary Diagnosis: Oncology    Chief Complaint   Patient presents with     Clinic Care Coordination - Post Hospital     Clinic Care Coordination RN         Universal Utilization: Hospital admission 6/2/-6/9/2021 Failure to thrive G-Tube placement   Clinic Utilization  Difficulty keeping appointments:: No  Compliance Concerns: No  No-Show Concerns: No  No PCP office visit in Past Year: No  Utilization    Last refreshed: 6/10/2021  6:12 AM: Hospital Admissions 1           Last refreshed: 6/10/2021  6:12 AM: ED Visits 1           Last refreshed: 6/10/2021  6:12 AM: No Show Count (past year) 1              Current as of: 6/10/2021  6:12 AM              Clinical Concerns:  Current Medical Concerns:  CC RN spoke to wife.  Wife reports the patient might be experiencing PTSD.  Patient Very anxious and poor sleep.  Patient had too many staff visiting him in the hospital with multiple instructions   Wife is managing his tube feeds and she is a nurse.  Wife has a call out to the Nutritionist for her questions.  Crushing most of his medication and administering via tube feeds   Patient is using mostly Norco for pain with relief   Patient has several Oncology future visits for treatment   Wife reminded of making PCP visit in 2-3 days and she agrees with the plan   Current Behavioral Concerns: See above   Education Provided to patient: CC RN will send introductory letter with Elsa Barrios CC RN contact for any future needs       Health Maintenance Reviewed: Not assessed  Clinical Pathway: None    Medication Management:  Medication reconciliation status: Medications reviewed and reconciled.  Changed medications per patient report.     Functional Status:  Dependent ADLs:: Independent  Dependent IADLs:: Transportation    Living Situation:  Current living arrangement:: I live in a private  home with spouse    Lifestyle & Psychosocial Needs:        Tube Feeding: Yes  Tube Feeding: G-tube  Transportation means:: Family     Chemical Dependency Status: No Current Concerns  Informal Support system:: Spouse   Socioeconomic History     Marital status:      Spouse name: Alessandra     Number of children: 2     Years of education: Not on file     Highest education level: Not on file   Occupational History     Occupation: Retired     Tobacco Use     Smoking status: Never Smoker     Smokeless tobacco: Never Used   Substance and Sexual Activity     Alcohol use: Yes     Alcohol/week: 8.3 standard drinks     Types: 10 Cans of beer per week     Comment: 2 beers a day      Drug use: No     Sexual activity: Yes     Partners: Female            Resources and Interventions:  Current Resources:      Community Resources: OP Infusion, Palliative Care  Supplies used at home:: Enteral Nutrition & Supplies  Equipment Currently Used at Home: none  Employment Status: retired)   )    Advance Care Plan/Directive  Advanced Care Plans/Directives on file:: Yes  Type Advanced Care Plans/Directives: Advanced Directive - On File    Referrals Placed: None     Goals:       Patient/Caregiver understanding: Wife expresses understanding of discharge INSTRUCTIONS        Future Appointments              Tomorrow Elier Serrano MD [facility]    Tomorrow [facility] RAD ONC [facility], [facility]    In 4 days [facility] RAD ONC VARIAN [facility]iation [facility], [facility]    In 6 days Reba Oshea SLP [facility]    In 1 week [facility] D[facility]    In 1 week Leisa Zarate CNP [facility]    In 2 weeks Reba Oshea SLP [facility]    In 3 weeks Elier Serrano MD [facility]  [facility]    In 1 month [facility]    In 1 month Ahmet Robbins MD [facility]    In 1 month Ligia Fang MD [facility]    In 2 months [facility]    In 2 months Ahmet Robbins MD [facility]    In 2 months Ligia Fang MD [facility]          Plan: Patients wife will make a PCP hospital follow up appointment in 2-3 days   Patient will keep future PCP, Oncology and Palliative appointments   No unmet needs, no future care coordination needed at this time   [facility]   Holly Asencio RN, Care Coordinator   [facility] [place]   E-mail [email or web address]   [phone]

## 2021-06-10 NOTE — LETTER
Health Care Home - Access Care Plan    About Me:    Patient Name:  Quan Murphy    YOB: 1950  Age:                      70 year old   [place] MRN:     [id number] Telephone Information:   Home Phone [phone]   Mobile [phone]       Address:  [address] [facility] [address] Email address:  [email or web address]      Emergency Contact(s)   Name Relationship Lgl Grd Work Phone Home Phone Mobile Phone   1. BRUNO MURPHY Spouse No none [phone]   2. GORDON MURPHY Son No [phone]             Health Maintenance: Routine Health maintenance Reviewed: Not assessed    My Access Plan  Medical Emergency 911   Questions or concerns during clinic hours Primary Clinic Line, I will call the clinic directly: [facility] - [phone]   24 Hour Appointment Line [phone] or  [phone] [facility] [phone]) (toll free)   24 Hour Nurse Line [phone] (toll free)   Questions or concerns outside clinic hours 24 Hour Appointment Line, I will call the after-hours on-call line:   [facility] [phone] or [phone]) (toll-free)   Preferred Urgent Care     Preferred Hospital [facility]  [phone]   Preferred Pharmacy [facility] [address]      Behavioral Health Crisis Line The National Suicide Prevention Lifeline at 1-724.563.1714 or 915                     My Care Team Members  Patient Care Team       Relationship Specialty Notifications Start End    Jose Hernandez MD PCP - General   11/7/01     Phone: [phone] Fax: [phone]         [address] [place] [address]    Jose Hernandez MD Assigned PCP   10/4/12     Phone: [phone] Fax: [phone]         [address] [facility] [address]    Aramis Ch MD Assigned Heart and Vascular Provider   10/23/20     Phone:  [phone] Fax: [phone] [address] [facility] [address]    Ligia Fang MD Assigned Surgical Provider   4/11/21     Phone: [phone] Fax: [phone]         [address] [facility] [address]    Ahmet Robbins MD Assigned Cancer Care Provider   4/25/21     Phone: [phone] Fax: [phone]         [address] [facility] [address]    Mirna Ramires, RN Specialty Care Coordinator Hematology & Oncology Abnormal results only, Admissions 4/29/21     Phone: [phone] Pager: [phone]        Elier Serrano MD Assigned Palliative Care Provider   5/23/21     Phone: [phone] Fax: [phone] [address] [facility] [address]           My Medical and Care Information  Problem List   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, insomnia type     Allergic conjunctivitis     Anxiety     Hyperlipidemia LDL goal <130     Hypertension goal BP (blood pressure) < 140/90     Advanced directives, counseling/discussion     Inguinal hernia     Degenerative arthritis of foot     Hallux rigidus     Coronary atherosclerosis     Arthropathy     Chest pain     Status post insertion of drug-eluting stent into left anterior descending artery for coronary artery disease     S/P ORIF (open reduction internal fixation) fracture     Closed Colles' fracture of right radius with routine healing, subsequent encounter     Syncope     Renal hematoma     Retroperitoneal hematoma     Hiatal hernia     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)     Hypomagnesemia     Failure to thrive (0-17)      Current Medications and Allergies:  See printed Medication Report

## 2021-06-10 NOTE — PROGRESS NOTES
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 Cycle 1, day 1 of weekly cisplatin.    2021: Weekly RT visit. Current dose of 424 cGy. Tolerating treatment well. No issues.    2021: Cycle 1, day 8 of weekly cisplatin.    2021: Weekly RT visit. Current dose of 1484 cGy. Tolerating treatment well.    2021: Cycle 1, day 15 of weekly cisplatin    2021: Weekly RT visit. Current dose of 2544 cGy. Moderate oropharyngeal pain.    2021: Cycle one, day 22 of weekly cisplatin    2021: Weekly RT visit. Current dose of 3604 cGy. Moderate to severe odynophagia. 2 kg weight loss over the past week.    2021: Cycle 1, day 29 of weekly cisplatin. Weekly RT visit. Current dose of 4664 cGy. Severe oral cavity and oropharyngeal pain. 3 kg weight loss over the past week.    2021: Admitted for failure to thrive    6/3/2021: Weekly RT visit. Current dose of 5300 cGy. Moderate to severe oropharyngeal pain. Minimal p.o. intake. Weight down approximately 2 kg over the past week.    2021: Respiratory arrest felt to be secondary to polypharmacy    2021: PEG placement    2021: Discharged home    6/10/2021: Weekly RT visit. Current dose of 6572 cGy. Ongoing moderate to severe oropharyngeal pain. Weight down approximately 2.5 kg over the past week.    ED visits/Hospitalizations:  1. 2021: Presentation to ED with low-grade fevers (99.6-100.1  F) and tachycardia to the low 100-120s. Work-up negative. Symptoms improved with IV  fluid support and pain medication.  2. 6/2/2021 - 6/9/2021: Admitted for failure to thrive and expedited PEG placement.    Missed Treatments:  1. 5/31/2021: 1-day treatment break between fractions 23-24 secondary to the Memorial Day holiday. Missed fraction was made up over the following weekend.  2. 6/2/2021: 1-day treatment break between fractions 24-25 secondary to hospital admission.     Subjective: Mr. Murphy presents to clinic today for his weekly on-treatment visit. He was discharged home yesterday and reports that he has been roughly stable since that time. He continues on a mix of short and long-acting narcotic pain medication and reports adequate relief of his symptoms on this regimen. Regarding caloric intake, he is currently taking 4 cans daily via PEG with small amounts of liquids by mouth. He does report ongoing issues with mild to moderate insomnia and inquires about restarting his Ambien prior to bedtime.    ROS:   Constitutional  Pain (0-10): 7 (mild), 7 (throat), 0 (skin)  Fatigue: Moderate to severe    CNS  Headaches: None    ENT  Mucositis: Severe    Cardiopulmonary  Dyspnea: None    GI  Nausea/vomiting: None    Nutrition  PEG: Yes  Diet: PEG dependent for 100% of caloric intake    Integumentary  Dermatitis: Moderate to severe    Objective:   Current weight: 93.0 kg  Last week's weight: 95.7 kg  Starting weight: 105.7 kg    BP: 143/86 (sitting), 135/100 (standing)  Pulse: 84 (sitting), 86 (standing)    General: Moderately fatigued-appearing 70-year-old gentleman seated in an examination chair no acute distress  HEENT: NC/AT.  EOMI.  No rhinorrhea or epistaxis.  Thickened oral cavity secretions.  Confluent mucositis throughout the oral cavity and visualized oropharynx.  Patchy ulcerations throughout the bilateral posterolateral oral tongue.  No evidence of thrush.  Pulmonary: No wheezing, stridor or respiratory distress  Skin: Brisk erythema involving the bilateral neck.  Patchy desquamation  extending beyond the skin folds predominantly on the left.    Treatment-related toxicities (CTCAE v5.0):    Mucositis: Grade 3    Dermatitis: Grade 3    Assessment:    Mr. 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 He is receiving curative-intent concurrent chemoradiation with weekly cisplatin. He was recently discharged following inpatient admission for failure to thrive secondary to poor p.o. intake and pain control.    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:    Complete chemoradiotherapy on Saturday, 6/12/2021    Follow-up with radiation oncology NP next week and weekly thereafter for symptom assessment    Follow-up in ENT multi D clinic on 7/30/2021 with CT neck prior    Pain management:    Continue current pain control regimen    Fluids/Electrolytes/Nutrition:    Continue caloric intake with nutritional goals as delineated by the oncology dietitian    Dermatitis:    Continue BID/TID Aquaphor use to the lower face bilateral neck    Continue Silvadene application twice daily to the areas of moist desquamation    Mucositis:    Pain control as described above    Continue frequent salt/soda rinses    Anxiety:    Continue Celexa    Insomnia:    Recommended against restarting Ambien due to concerns of polypharmacy and his ongoing opioid needs for pain control    Follow-up with palliative care regarding recommendations for alternatives to benzodiazepines    Mosaiq chart and setup information reviewed  IGRT images reviewed    Medication list reviewed    Ahmet Robbins MD/PhD    Dept of Radiation Oncology  [facility]

## 2021-06-10 NOTE — LETTER
[facility] CARE COORDINATION  [address] [facility] [address]  Radha 10, 2021    Quan Murphy  [address] [facility] [address]      Dear Quan,    I am a clinic care coordinator who works with Jose Hernandez MD at [facility] . I wanted to thank your wife for spending the time to talk with me.  Below is a description of clinic care coordination and how I can further assist you.      The clinic care coordination team is made up of a registered nurse,  and community health worker who understand the health care system. The goal of clinic care coordination is to help you manage your health and improve access to the health care system in the most efficient manner. The team can assist you in meeting your health care goals by providing education, coordinating services, strengthening the communication among your providers and supporting you with any resource needs.    Please feel free to contact [place] nurse care coordinator at [phone] with any questions or concerns. We are focused on providing you with the highest-quality healthcare experience possible and that all starts with you.     Sincerely,     [facility]   Holly Asencio RN, Care Coordinator   [facility] [place]   E-mail [email or web address]   [phone]     Enclosed: I have enclosed a copy of a 24 Hour Access Plan. This has helpful phone numbers for you to call when needed. Please keep this in an easy to access place to use as needed.

## 2021-06-10 NOTE — LETTER
6/10/2021         RE: Quan Murphy   [address] [facility] [address]        Dear Colleague,    Thank you for referring your patient, Quan Murphy, to the [facility] RADIATION ONCOLOGY. Please see a copy of my visit note below.    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 Cycle 1, day 1 of weekly cisplatin.    2021: Weekly RT visit. Current dose of 424 cGy. Tolerating treatment well. No issues.    2021: Cycle 1, day 8 of weekly cisplatin.    2021: Weekly RT visit. Current dose of 1484 cGy. Tolerating treatment well.    2021: Cycle 1, day 15 of weekly cisplatin    2021: Weekly RT visit. Current dose of 2544 cGy. Moderate oropharyngeal pain.    2021: Cycle one, day 22 of weekly cisplatin    2021: Weekly RT visit. Current dose of 3604 cGy. Moderate to severe odynophagia. 2 kg weight loss over the past week.    2021: Cycle 1, day 29 of weekly cisplatin. Weekly RT visit. Current dose of 4664 cGy. Severe oral cavity and oropharyngeal pain. 3 kg weight loss over the past week.    2021: Admitted for failure to thrive    6/3/2021: Weekly RT visit. Current dose of 5300 cGy. Moderate to severe oropharyngeal pain. Minimal p.o. intake. Weight down approximately 2 kg over the past week.    2021: Respiratory arrest felt to be secondary to polypharmacy    2021: PEG placement    2021: Discharged home    6/10/2021: Weekly RT visit. Current dose of 6572 cGy. Ongoing moderate to severe  oropharyngeal pain. Weight down approximately 2.5 kg over the past week.    ED visits/Hospitalizations:  1. 5/12/2021: Presentation to ED with low-grade fevers (99.6-100.1  F) and tachycardia to the low 100-120s. Work-up negative. Symptoms improved with IV fluid support and pain medication.  2. 6/2/2021 - 6/9/2021: Admitted for failure to thrive and expedited PEG placement.    Missed Treatments:  1. 5/31/2021: 1-day treatment break between fractions 23-24 secondary to the Memorial Day holiday. Missed fraction was made up over the following weekend.  2. 6/2/2021: 1-day treatment break between fractions 24-25 secondary to hospital admission.     Subjective: Mr. Murphy presents to clinic today for his weekly on-treatment visit. He was discharged home yesterday and reports that he has been roughly stable since that time. He continues on a mix of short and long-acting narcotic pain medication and reports adequate relief of his symptoms on this regimen. Regarding caloric intake, he is currently taking 4 cans daily via PEG with small amounts of liquids by mouth. He does report ongoing issues with mild to moderate insomnia and inquires about restarting his Ambien prior to bedtime.    ROS:   Constitutional  Pain (0-10): 7 (mild), 7 (throat), 0 (skin)  Fatigue: Moderate to severe    CNS  Headaches: None    ENT  Mucositis: Severe    Cardiopulmonary  Dyspnea: None    GI  Nausea/vomiting: None    Nutrition  PEG: Yes  Diet: PEG dependent for 100% of caloric intake    Integumentary  Dermatitis: Moderate to severe    Objective:   Current weight: 93.0 kg  Last week's weight: 95.7 kg  Starting weight: 105.7 kg    BP: 143/86 (sitting), 135/100 (standing)  Pulse: 84 (sitting), 86 (standing)    General: Moderately fatigued-appearing 70-year-old gentleman seated in an examination chair no acute distress  HEENT: NC/AT.  EOMI.  No rhinorrhea or epistaxis.  Thickened oral cavity secretions.  Confluent mucositis throughout the oral cavity  and visualized oropharynx.  Patchy ulcerations throughout the bilateral posterolateral oral tongue.  No evidence of thrush.  Pulmonary: No wheezing, stridor or respiratory distress  Skin: Brisk erythema involving the bilateral neck.  Patchy desquamation extending beyond the skin folds predominantly on the left.    Treatment-related toxicities (CTCAE v5.0):    Mucositis: Grade 3    Dermatitis: Grade 3    Assessment:    Mr. 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 He is receiving curative-intent concurrent chemoradiation with weekly cisplatin. He was recently discharged following inpatient admission for failure to thrive secondary to poor p.o. intake and pain control.    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:    Complete chemoradiotherapy on Saturday, 6/12/2021    Follow-up with radiation oncology NP next week and weekly thereafter for symptom assessment    Follow-up in ENT multi D clinic on 7/30/2021 with CT neck prior    Pain management:    Continue current pain control regimen    Fluids/Electrolytes/Nutrition:    Continue caloric intake with nutritional goals as delineated by the oncology dietitian    Dermatitis:    Continue BID/TID Aquaphor use to the lower face bilateral neck    Continue Silvadene application twice daily to the areas of moist desquamation    Mucositis:    Pain control as described above    Continue frequent salt/soda rinses    Anxiety:    Continue Celexa    Insomnia:    Recommended against restarting Ambien due to concerns of polypharmacy and his ongoing opioid needs for pain control    Follow-up with palliative care regarding recommendations for alternatives to benzodiazepines    Mosaiq chart and setup information reviewed  IGRT images reviewed    Medication list reviewed    Ahmet Robbins MD/PhD    Dept of Radiation Oncology  [facility]       Again, thank you  for allowing me to participate in the care of your patient.        Sincerely,        Ahmet Robbins MD

## 2021-06-10 NOTE — LETTER
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 Cycle 1, day 1 of weekly cisplatin.    2021: Weekly RT visit. Current dose of 424 cGy. Tolerating treatment well. No issues.    2021: Cycle 1, day 8 of weekly cisplatin.    2021: Weekly RT visit. Current dose of 1484 cGy. Tolerating treatment well.    2021: Cycle 1, day 15 of weekly cisplatin    2021: Weekly RT visit. Current dose of 2544 cGy. Moderate oropharyngeal pain.    2021: Cycle one, day 22 of weekly cisplatin    2021: Weekly RT visit. Current dose of 3604 cGy. Moderate to severe odynophagia. 2 kg weight loss over the past week.    2021: Cycle 1, day 29 of weekly cisplatin. Weekly RT visit. Current dose of 4664 cGy. Severe oral cavity and oropharyngeal pain. 3 kg weight loss over the past week.    2021: Admitted for failure to thrive    6/3/2021: Weekly RT visit. Current dose of 5300 cGy. Moderate to severe oropharyngeal pain. Minimal p.o. intake. Weight down approximately 2 kg over the past week.    2021: Respiratory arrest felt to be secondary to polypharmacy    2021: PEG placement    2021: Discharged home    6/10/2021: Weekly RT visit. Current dose of 6572 cGy. Ongoing moderate to severe oropharyngeal pain. Weight down approximately 2.5 kg over the past week.    ED visits/Hospitalizations:  1. 2021: Presentation to ED with low-grade fevers (99.6-100.1  F) and  tachycardia to the low 100-120s. Work-up negative. Symptoms improved with IV fluid support and pain medication.  2. 6/2/2021 - 6/9/2021: Admitted for failure to thrive and expedited PEG placement.    Missed Treatments:  1. 5/31/2021: 1-day treatment break between fractions 23-24 secondary to the Memorial Day holiday. Missed fraction was made up over the following weekend.  2. 6/2/2021: 1-day treatment break between fractions 24-25 secondary to hospital admission.     Subjective: Mr. Murphy presents to clinic today for his weekly on-treatment visit. He was discharged home yesterday and reports that he has been roughly stable since that time. He continues on a mix of short and long-acting narcotic pain medication and reports adequate relief of his symptoms on this regimen. Regarding caloric intake, he is currently taking 4 cans daily via PEG with small amounts of liquids by mouth. He does report ongoing issues with mild to moderate insomnia and inquires about restarting his Ambien prior to bedtime.    ROS:   Constitutional  Pain (0-10): 7 (mild), 7 (throat), 0 (skin)  Fatigue: Moderate to severe    CNS  Headaches: None    ENT  Mucositis: Severe    Cardiopulmonary  Dyspnea: None    GI  Nausea/vomiting: None    Nutrition  PEG: Yes  Diet: PEG dependent for 100% of caloric intake    Integumentary  Dermatitis: Moderate to severe    Objective:   Current weight: 93.0 kg  Last week's weight: 95.7 kg  Starting weight: 105.7 kg    BP: 143/86 (sitting), 135/100 (standing)  Pulse: 84 (sitting), 86 (standing)    General: Moderately fatigued-appearing 70-year-old gentleman seated in an examination chair no acute distress  HEENT: NC/AT.  EOMI.  No rhinorrhea or epistaxis.  Thickened oral cavity secretions.  Confluent mucositis throughout the oral cavity and visualized oropharynx.  Patchy ulcerations throughout the bilateral posterolateral oral tongue.  No evidence of thrush.  Pulmonary: No wheezing, stridor or respiratory  distress  Skin: Brisk erythema involving the bilateral neck.  Patchy desquamation extending beyond the skin folds predominantly on the left.    Treatment-related toxicities (CTCAE v5.0):    Mucositis: Grade 3    Dermatitis: Grade 3    Assessment:    Mr. 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 He is receiving curative-intent concurrent chemoradiation with weekly cisplatin. He was recently discharged following inpatient admission for failure to thrive secondary to poor p.o. intake and pain control.    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:    Complete chemoradiotherapy on Saturday, 6/12/2021    Follow-up with radiation oncology NP next week and weekly thereafter for symptom assessment    Follow-up in ENT multi D clinic on 7/30/2021 with CT neck prior    Pain management:    Continue current pain control regimen    Fluids/Electrolytes/Nutrition:    Continue caloric intake with nutritional goals as delineated by the oncology dietitian    Dermatitis:    Continue BID/TID Aquaphor use to the lower face bilateral neck    Continue Silvadene application twice daily to the areas of moist desquamation    Mucositis:    Pain control as described above    Continue frequent salt/soda rinses    Anxiety:    Continue Celexa    Insomnia:    Recommended against restarting Ambien due to concerns of polypharmacy and his ongoing opioid needs for pain control    Follow-up with palliative care regarding recommendations for alternatives to benzodiazepines    Mosaiq chart and setup information reviewed  IGRT images reviewed    Medication list reviewed    Ahmet Robbins MD/PhD    Dept of Radiation Oncology  [facility]

## 2021-06-11 ENCOUNTER — VIRTUAL VISIT (OUTPATIENT)
Dept: PALLIATIVE CARE | Facility: CLINIC | Age: 71
End: 2021-06-11
Attending: INTERNAL MEDICINE
Payer: MEDICARE

## 2021-06-11 ENCOUNTER — APPOINTMENT (OUTPATIENT)
Dept: RADIATION ONCOLOGY | Facility: CLINIC | Age: 71
End: 2021-06-11
Attending: RADIOLOGY
Payer: MEDICARE

## 2021-06-11 DIAGNOSIS — C09.9 SQUAMOUS CELL CARCINOMA OF LEFT TONSIL (H): ICD-10-CM

## 2021-06-11 DIAGNOSIS — E83.42 HYPOMAGNESEMIA: ICD-10-CM

## 2021-06-11 DIAGNOSIS — G47.01 INSOMNIA DUE TO MEDICAL CONDITION: ICD-10-CM

## 2021-06-11 DIAGNOSIS — C09.9 SQUAMOUS CELL CARCINOMA OF LEFT TONSIL (H): Primary | ICD-10-CM

## 2021-06-11 DIAGNOSIS — E43 SEVERE PROTEIN-CALORIE MALNUTRITION (H): ICD-10-CM

## 2021-06-11 DIAGNOSIS — G89.3 NEOPLASM RELATED PAIN: ICD-10-CM

## 2021-06-11 DIAGNOSIS — Z78.9 ON ENTERAL NUTRITION: ICD-10-CM

## 2021-06-11 LAB
ALBUMIN SERPL-MCNC: 2.4 G/DL (ref 3.4–5)
ALP SERPL-CCNC: 55 U/L (ref 40–150)
ALT SERPL W P-5'-P-CCNC: 23 U/L (ref 0–70)
ANION GAP SERPL CALCULATED.3IONS-SCNC: 4 MMOL/L (ref 3–14)
AST SERPL W P-5'-P-CCNC: 28 U/L (ref 0–45)
BASOPHILS # BLD AUTO: 0 10E9/L (ref 0–0.2)
BASOPHILS NFR BLD AUTO: 0.4 %
BILIRUB SERPL-MCNC: 0.3 MG/DL (ref 0.2–1.3)
BUN SERPL-MCNC: 16 MG/DL (ref 7–30)
CALCIUM SERPL-MCNC: 8.3 MG/DL (ref 8.5–10.1)
CHLORIDE SERPL-SCNC: 102 MMOL/L (ref 94–109)
CO2 SERPL-SCNC: 32 MMOL/L (ref 20–32)
CREAT SERPL-MCNC: 1.2 MG/DL (ref 0.66–1.25)
DIFFERENTIAL METHOD BLD: ABNORMAL
EOSINOPHIL # BLD AUTO: 0 10E9/L (ref 0–0.7)
EOSINOPHIL NFR BLD AUTO: 0.8 %
ERYTHROCYTE [DISTWIDTH] IN BLOOD BY AUTOMATED COUNT: 15.9 % (ref 10–15)
GFR SERPL CREATININE-BSD FRML MDRD: 61 ML/MIN/{1.73_M2}
GLUCOSE SERPL-MCNC: 114 MG/DL (ref 70–99)
HCT VFR BLD AUTO: 29.5 % (ref 40–53)
HGB BLD-MCNC: 9.6 G/DL (ref 13.3–17.7)
IMM GRANULOCYTES # BLD: 0 10E9/L (ref 0–0.4)
IMM GRANULOCYTES NFR BLD: 0.8 %
LYMPHOCYTES # BLD AUTO: 0.3 10E9/L (ref 0.8–5.3)
LYMPHOCYTES NFR BLD AUTO: 13.3 %
MAGNESIUM SERPL-MCNC: 1.3 MG/DL (ref 1.6–2.3)
MCH RBC QN AUTO: 29.1 PG (ref 26.5–33)
MCHC RBC AUTO-ENTMCNC: 32.5 G/DL (ref 31.5–36.5)
MCV RBC AUTO: 89 FL (ref 78–100)
MONOCYTES # BLD AUTO: 0.4 10E9/L (ref 0–1.3)
MONOCYTES NFR BLD AUTO: 15.4 %
NEUTROPHILS # BLD AUTO: 1.7 10E9/L (ref 1.6–8.3)
NEUTROPHILS NFR BLD AUTO: 69.3 %
NRBC # BLD AUTO: 0 10*3/UL
NRBC BLD AUTO-RTO: 0 /100
PHOSPHATE SERPL-MCNC: 3.1 MG/DL (ref 2.5–4.5)
PLATELET # BLD AUTO: 162 10E9/L (ref 150–450)
POTASSIUM SERPL-SCNC: 3.7 MMOL/L (ref 3.4–5.3)
PROT SERPL-MCNC: 6 G/DL (ref 6.8–8.8)
RBC # BLD AUTO: 3.3 10E12/L (ref 4.4–5.9)
SODIUM SERPL-SCNC: 138 MMOL/L (ref 133–144)
WBC # BLD AUTO: 2.5 10E9/L (ref 4–11)

## 2021-06-11 PROCEDURE — 84100 ASSAY OF PHOSPHORUS: CPT | Performed by: INTERNAL MEDICINE

## 2021-06-11 PROCEDURE — 999N001193 HC VIDEO/TELEPHONE VISIT; NO CHARGE

## 2021-06-11 PROCEDURE — 80053 COMPREHEN METABOLIC PANEL: CPT | Performed by: INTERNAL MEDICINE

## 2021-06-11 PROCEDURE — 85025 COMPLETE CBC W/AUTO DIFF WBC: CPT | Performed by: INTERNAL MEDICINE

## 2021-06-11 PROCEDURE — 36415 COLL VENOUS BLD VENIPUNCTURE: CPT | Performed by: INTERNAL MEDICINE

## 2021-06-11 PROCEDURE — 77014 PR CT GUIDE FOR PLACEMENT RADIATION THERAPY FIELDS: CPT | Mod: 26 | Performed by: RADIOLOGY

## 2021-06-11 PROCEDURE — 99443 PR PHYSICIAN TELEPHONE EVALUATION 21-30 MIN: CPT | Mod: 95 | Performed by: INTERNAL MEDICINE

## 2021-06-11 PROCEDURE — 77386 HC IMRT TREATMENT DELIVERY, COMPLEX: CPT | Performed by: RADIOLOGY

## 2021-06-11 PROCEDURE — 83735 ASSAY OF MAGNESIUM: CPT | Performed by: INTERNAL MEDICINE

## 2021-06-11 RX ORDER — PROCHLORPERAZINE 25 MG
25 SUPPOSITORY, RECTAL RECTAL EVERY 12 HOURS PRN
Qty: 15 SUPPOSITORY | Refills: 1 | Status: SHIPPED | OUTPATIENT
Start: 2021-06-11 | End: 2021-06-21

## 2021-06-11 NOTE — PROGRESS NOTES
"New is a 70 year old who is being evaluated via a billable telephone visit.      What phone number would you like to be contacted at? [phone]  How would you like to obtain your AVS? MyChart   Vitals - Patient Reported  Weight (Patient Reported): 93 kg (205 lb)  Height (Patient Reported): 174 cm (5' 8.5\")  BMI (Based on Pt Reported Ht/Wt): 30.72  Pain Score: No Pain (0)       Lauren Jensen MA    [facility]    (This note was transcribed using voice recognition software. While I review and edit the transcription, I may miss errors, and the software sometimes does unexpected capitalizations and formatting that I miss. Please let me know of any serious mistranscriptions and I will addend this note.)    Patient ID:  Medical - He has SCC L tonsil dx 3/2021 [id number] p16+  Definitive chemoradiotherapy 4-6/2021 6/2/2021 admitted with FTT/urgent need for GT placement and had a respiratory arrest 2 d later inpatient thought to be drug-induced. Got PEG and was discharged.  6/12/2021 radiation end date    He has CKD and chronic pain (headaches/sinus pain) on long-term hydrocodone therapy.  On ambien and alprazolam for insomnia/anxiety    Social - Lives with wife. 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     Opioid Safety -  +naloxone  See 5/14/2021 opioid risk/safety discussion; I consider him higher risk due to long history of higher risk polysubstance use (opioids, benzos, alcohol), although without a clear BRENNAN history, self-titration of meds, anxiety. Expectations for opioid tapering after cancer treatment discussed. Knows plan is to taper him down to his baseline opioid use and will 'hand back' his chronic pain management to his PCP at that point.       History:  History gathered today from: patient, medical chart    Losing his voice  We celebrated being 2 fx away from completing his RT! We commiserated about how difficult it has " been.   PEG working well overall, wife manages it capably; appreciates being able to use the tube for meds.    We discussed opioid tapering expectations and timeframe (unrealistic to begin tapering for probably a couple weeks after RT ends)    Pain:  Doing ok: methadone 7.5 mg tid he was discharged on; he is actually taking 5 tabs a day spaced evenly out and reports that he thought someone told him that (?).  Vicodin 10mgs tabs: remains on 6 a day the last week since discharge    PE: There were no vitals taken for this visit.   Wt Readings from Last 3 Encounters:   06/10/21 93 kg (205 lb)   06/08/21 97.9 kg (215 lb 14.4 oz)   06/03/21 95.7 kg (211 lb)     Alert sensorium appears clear on phone  Voice hoarse but totally intelliglbe      Data reviewed:  I reviewed recent labs and imaging, my comments:  Cr 1.13     database reviewed: y      Impression & Recommendations:  71 yo with head and neck cancer completing up definitve chemoradiotherapy complicated by severe pain; high dose opioid use in the context of baseline chronic opioid therapy for chronic pain; inadvertent/iatrogenic inpatient overdose requiring naloxone; high risk long-term chemical use (benzos, ambien although off ambien at the moment he reports, alcohol -- not drinking at the moment).     Celebrated completing RT!  D/w him pain will begin improving soon, in next 2-3 weeks likely  Closely reviewed with him how he should be taking his meds, as prescribed  We will need to taper him off methadone entirely and I plan to taper him down to his baseline vicodin use at which point we'll hand back his chronic pain mgt to his primary care doctor who's done it long-term.  This process can take 6-12 weeks typically, often there's a turning point in which pain rapidly starts improving.    I d/w him I really think he should taper off the alprazolam long term too but also think he should prioritize his energy on opioid tapering in the coming months, that's the top  priority, then would focus on alprazolam. Long term I think he'll be better off on daily ambien than daily alprazolam; neither are ideal but daily ambien is far safer/better for him than chronic daily benzo use and we discussed that.    35 minutes spent on the date of the encounter doing chart review, history and exam, patient education & counseling, documentation and other activities as noted above.    25 min on the phone  Thank you for involving us in the patient's care.   Elier Serrano MD / Palliative Medicine / Text me via [facility].

## 2021-06-11 NOTE — LETTER
"6/11/2021       RE: Quan Murphy  [address]  [facility] [address]     Dear Colleague,    [facility] you [facility] Quan Murphy[facility]. Please see a copy of my visit note below.    New is a 70 year old who is being evaluated via a billable telephone visit.      What phone number would you like to be contacted at? [phone]  How would you like to obtain your AVS? MyChart   Vitals - Patient Reported  Weight (Patient Reported): 93 kg (205 lb)  Height (Patient Reported): 174 cm (5' 8.5\")  BMI (Based on Pt Reported Ht/Wt): 30.72  Pain Score: No Pain (0)       Lauren Jensen MA    P[facility]    (This note was transcribed using voice recognition software. While I review and edit the transcription, I may miss errors, and the software sometimes does unexpected capitalizations and formatting that I miss. Please let me know of any serious mistranscriptions and I will addend this note.)    Patient ID:  Medical - He has SCC L tonsil dx 3/2021 [id number] p16+  Definitive chemoradiotherapy 4-6/2021 6/2/2021 admitted with FTT/urgent need for GT placement and had a respiratory arrest 2 d later inpatient thought to be drug-induced. Got PEG and was discharged.  6/12/2021 radiation end date    He has CKD and chronic pain (headaches/sinus pain) on long-term hydrocodone therapy.  On ambien and alprazolam for insomnia/anxiety    Social - Lives with wife. 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     Opioid Safety -  +naloxone  See 5/14/2021 opioid risk/safety discussion; I consider him higher risk due to long history of higher risk polysubstance use (opioids, benzos, alcohol), although without a clear BRENNAN history, self-titration of meds, anxiety. Expectations for opioid tapering after cancer treatment discussed. Knows plan is to " taper him down to his baseline opioid use and will 'hand back' his chronic pain management to his PCP at that point.       History:  History gathered today from: patient, medical chart    Losing his voice  We celebrated being 2 fx away from completing his RT! We commiserated about how difficult it has been.   PEG working well overall, wife manages it capably; appreciates being able to use the tube for meds.    We discussed opioid tapering expectations and timeframe (unrealistic to begin tapering for probably a couple weeks after RT ends)    Pain:  Doing ok: methadone 7.5 mg tid he was discharged on; he is actually taking 5 tabs a day spaced evenly out and reports that he thought someone told him that (?).  Vicodin 10mgs tabs: remains on 6 a day the last week since discharge    PE: There were no vitals taken for this visit.   Wt Readings from Last 3 Encounters:   06/10/21 93 kg (205 lb)   06/08/21 97.9 kg (215 lb 14.4 oz)   06/03/21 95.7 kg (211 lb)     Alert sensorium appears clear on phone  Voice hoarse but totally intelliglbe      Data reviewed:  I reviewed recent labs and imaging, my comments:  Cr 1.13     database reviewed: y      Impression & Recommendations:  69 yo with head and neck cancer completing up definitve chemoradiotherapy complicated by severe pain; high dose opioid use in the context of baseline chronic opioid therapy for chronic pain; inadvertent/iatrogenic inpatient overdose requiring naloxone; high risk long-term chemical use (benzos, ambien although off ambien at the moment he reports, alcohol -- not drinking at the moment).     Celebrated completing RT!  D/w him pain will begin improving soon, in next 2-3 weeks likely  Closely reviewed with him how he should be taking his meds, as prescribed  We will need to taper him off methadone entirely and I plan to taper him down to his baseline vicodin use at which point we'll hand back his chronic pain mgt to his primary care doctor who's done it  long-term.  This process can take 6-12 weeks typically, often there's a turning point in which pain rapidly starts improving.    I d/w him I really think he should taper off the alprazolam long term too but also think he should prioritize his energy on opioid tapering in the coming months, that's the top priority, then would focus on alprazolam. Long term I think he'll be better off on daily ambien than daily alprazolam; neither are ideal but daily ambien is far safer/better for him than chronic daily benzo use and we discussed that.    35 minutes spent on the date of the encounter doing chart review, history and exam, patient education & counseling, documentation and other activities as noted above.    25 min on the phone  Thank you for involving us in the patient's care.   Elier Serrano MD / Palliative Medicine / Text me via [facility].              Again, thank you for allowing me to participate in the care of your patient.      Sincerely,    Elier Serrano MD

## 2021-06-12 ENCOUNTER — APPOINTMENT (OUTPATIENT)
Dept: RADIATION ONCOLOGY | Facility: CLINIC | Age: 71
End: 2021-06-12
Attending: RADIOLOGY
Payer: MEDICARE

## 2021-06-12 ENCOUNTER — HOME INFUSION (PRE-WILLOW HOME INFUSION) (OUTPATIENT)
Dept: PHARMACY | Facility: CLINIC | Age: 71
End: 2021-06-12

## 2021-06-12 PROCEDURE — 77014 PR CT GUIDE FOR PLACEMENT RADIATION THERAPY FIELDS: CPT | Mod: 26 | Performed by: RADIOLOGY

## 2021-06-12 PROCEDURE — 77427 RADIATION TX MANAGEMENT X5: CPT | Performed by: RADIOLOGY

## 2021-06-12 PROCEDURE — 77336 RADIATION PHYSICS CONSULT: CPT | Performed by: RADIOLOGY

## 2021-06-12 PROCEDURE — 77386 HC IMRT TREATMENT DELIVERY, COMPLEX: CPT | Performed by: RADIOLOGY

## 2021-06-14 ENCOUNTER — VIRTUAL VISIT (OUTPATIENT)
Dept: RADIATION ONCOLOGY | Facility: CLINIC | Age: 71
End: 2021-06-14
Attending: RADIOLOGY
Payer: MEDICARE

## 2021-06-14 DIAGNOSIS — E83.42 HYPOMAGNESEMIA: ICD-10-CM

## 2021-06-14 DIAGNOSIS — C09.9 SQUAMOUS CELL CARCINOMA OF LEFT TONSIL (H): ICD-10-CM

## 2021-06-14 DIAGNOSIS — C09.9 SQUAMOUS CELL CARCINOMA OF LEFT TONSIL (H): Primary | ICD-10-CM

## 2021-06-14 LAB
ALBUMIN SERPL-MCNC: 2.4 G/DL (ref 3.4–5)
ALP SERPL-CCNC: 53 U/L (ref 40–150)
ALT SERPL W P-5'-P-CCNC: 29 U/L (ref 0–70)
ANION GAP SERPL CALCULATED.3IONS-SCNC: 2 MMOL/L (ref 3–14)
AST SERPL W P-5'-P-CCNC: 34 U/L (ref 0–45)
BASOPHILS # BLD AUTO: 0 10E9/L (ref 0–0.2)
BASOPHILS NFR BLD AUTO: 0.2 %
BILIRUB SERPL-MCNC: 0.3 MG/DL (ref 0.2–1.3)
BUN SERPL-MCNC: 21 MG/DL (ref 7–30)
CALCIUM SERPL-MCNC: 9.3 MG/DL (ref 8.5–10.1)
CHLORIDE SERPL-SCNC: 98 MMOL/L (ref 94–109)
CO2 SERPL-SCNC: 36 MMOL/L (ref 20–32)
CREAT SERPL-MCNC: 1.3 MG/DL (ref 0.66–1.25)
DIFFERENTIAL METHOD BLD: ABNORMAL
EOSINOPHIL # BLD AUTO: 0 10E9/L (ref 0–0.7)
EOSINOPHIL NFR BLD AUTO: 0.2 %
ERYTHROCYTE [DISTWIDTH] IN BLOOD BY AUTOMATED COUNT: 16.4 % (ref 10–15)
GFR SERPL CREATININE-BSD FRML MDRD: 55 ML/MIN/{1.73_M2}
GLUCOSE SERPL-MCNC: 90 MG/DL (ref 70–99)
HCT VFR BLD AUTO: 32.3 % (ref 40–53)
HGB BLD-MCNC: 10 G/DL (ref 13.3–17.7)
IMM GRANULOCYTES # BLD: 0 10E9/L (ref 0–0.4)
IMM GRANULOCYTES NFR BLD: 0.2 %
LYMPHOCYTES # BLD AUTO: 0.2 10E9/L (ref 0.8–5.3)
LYMPHOCYTES NFR BLD AUTO: 5.6 %
MAGNESIUM SERPL-MCNC: 1.8 MG/DL (ref 1.6–2.3)
MCH RBC QN AUTO: 28.7 PG (ref 26.5–33)
MCHC RBC AUTO-ENTMCNC: 31 G/DL (ref 31.5–36.5)
MCV RBC AUTO: 93 FL (ref 78–100)
MONOCYTES # BLD AUTO: 1.1 10E9/L (ref 0–1.3)
MONOCYTES NFR BLD AUTO: 25.8 %
NEUTROPHILS # BLD AUTO: 2.8 10E9/L (ref 1.6–8.3)
NEUTROPHILS NFR BLD AUTO: 68 %
NRBC # BLD AUTO: 0 10*3/UL
NRBC BLD AUTO-RTO: 0 /100
PLATELET # BLD AUTO: 258 10E9/L (ref 150–450)
PLATELET # BLD EST: ABNORMAL 10*3/UL
POTASSIUM SERPL-SCNC: 4.5 MMOL/L (ref 3.4–5.3)
PROT SERPL-MCNC: 6.3 G/DL (ref 6.8–8.8)
RBC # BLD AUTO: 3.49 10E12/L (ref 4.4–5.9)
RBC MORPH BLD: NORMAL
SODIUM SERPL-SCNC: 136 MMOL/L (ref 133–144)
WBC # BLD AUTO: 4.1 10E9/L (ref 4–11)

## 2021-06-14 PROCEDURE — 80053 COMPREHEN METABOLIC PANEL: CPT | Performed by: INTERNAL MEDICINE

## 2021-06-14 PROCEDURE — 85025 COMPLETE CBC W/AUTO DIFF WBC: CPT | Performed by: INTERNAL MEDICINE

## 2021-06-14 PROCEDURE — 36415 COLL VENOUS BLD VENIPUNCTURE: CPT | Performed by: INTERNAL MEDICINE

## 2021-06-14 PROCEDURE — 83735 ASSAY OF MAGNESIUM: CPT | Performed by: INTERNAL MEDICINE

## 2021-06-14 NOTE — PROGRESS NOTES
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  He is only 2 days out from treatment so has not noticed any improvement in his symptoms.  He continues to have a lot of phlegm.  Nutrition is 100% via PEG tube and they are not able to get in quite as much as recommended by dietitian.  They have not been weighing him but recommended they weigh him every couple of days and write it down keep track to see if he is losing weight.  His pain is adequately controlled and palliative care is working with him.  His wife does his tube feedings and also does free water feels like he has been getting a decent amount there.  He is very fatigued and spends a lot of his day sleeping.  He has only taken a few bites of food recommended that he eat a few bites and every day.  He states he is working on his swallowing exercises.  He continues to moisturize.  Did have labs done today and it looks like his creatinine is slightly elevated so encouraged to push fluids.  He is in a better mood today than when he was going through treatment.    ROS:  Complete review of systems is negative except for symptoms discussed in subjective above.    Current Outpatient Medications   Medication     ALPRAZolam (XANAX) 0.5 MG tablet     ASPIRIN ADULT LOW STRENGTH 81 MG EC tablet     citalopram (CELEXA) 40 MG tablet     doxepin (SINEQUAN) 10 MG/ML (HIGH CONC) solution     guaiFENesin (MUCINEX) 600 MG 12 hr tablet     HYDROcodone-acetaminophen (NORCO)  MG per tablet     hydrOXYzine (VISTARIL) 25 MG capsule      magic mouthwash (ENTER INGREDIENTS IN COMMENTS) suspension     methadone (DOLOPHINE) 5 MG tablet     metoprolol succinate ER (TOPROL-XL) 50 MG 24 hr tablet     naloxone (NARCAN) 4 MG/0.1ML nasal spray     nitroGLYcerin (NITROSTAT) 0.4 MG sublingual tablet     omeprazole (PRILOSEC) 40 MG DR capsule     ondansetron (ZOFRAN-ODT) 8 MG ODT tab     prochlorperazine (COMPAZINE) 10 MG tablet     prochlorperazine (COMPAZINE) 25 MG suppository     protein modular (PROSOURCE TF) LIQD     rosuvastatin (CRESTOR) 40 MG tablet     silver sulfADIAZINE (SILVADENE) 1 % external cream     testosterone (ANDROGEL 1.62 % PUMP) 20.25 MG/ACT gel     zolpidem (AMBIEN) 10 MG tablet     No current facility-administered medications for this visit.           Allergies   Allergen Reactions     Animal Dander      Azithromycin Nausea and Vomiting     Dust Mites      Pollen Extract      Smoke.        Past Medical History:   Diagnosis Date     Allergy, unspecified not elsewhere classified     Seasonal allergies, pollen, dust, smoke and animals     Antiplatelet or antithrombotic long-term use      Anxiety      Arthritis      Chest pain      Chronic sinusitis      Coronary atherosclerosis of unspecified type of vessel, native or graft     Coronary artery disease     Hyperlipidemia      Hypertension      Inguinal hernia      Kidney stones      Malignant neoplasm of prostate (H)     Prostate cancer     Prostate cancer (H)          PHYSICAL EXAM:  Gen: Alert, in NAD.  He was sleeping when I called him but he did wake up and seemed in good spirits.        LABS AND IMAGING:  Reviewed.    IMPRESSION:   Mr. Murphy is a 70 year old male with a squamous cell carcinoma of the left tonsil s/p chemoradiation now 2 days out since completion of treatment and is stable and it is too soon to see any improvement in his acute side effects.    PLAN:   1. Follow-up with me next week.  Follow up with Dr. Whiteside in 1 month.   Continue close follow up with ENT and medical  oncology.  2. Continue to moisturize with aquaphor and when skin is completely healed can change to preferred moisturizer.  Discussed importance of sun avoidance or sun protection.  3. Fatigue should continue to improve.  4. Continue oral cares with salt and soda rinses.  Routine dental follow up at least every 6 months.  Continue to use fluoride trays or fluoride treatments. Continue jaw, neck and swallowing exercises.  5. Treatment related pain should continue to diminish.  Recommended to slowly wean off pain medications when pain decreases.  He will be on a weaning schedule per palliative care.  His wife did state that she thinks he might be taking a little extra pain medication.  I did tell Quan that if he is taking any additional pain medication he has been a run out early and he will not be given any additional meds in addition to it not being safe.  6. Continue to push fluids and caloric intake to maintain weight.  Try to increase oral intake and decrease calories through PEG tube.  Push fluids as creatinine is slightly elevated.  Encouraged New to eat some food by mouth everyday.  I want him to weigh himself at home every 2 days and record his weight.  Report back in next week with weights.        Time on phone:  23 minutes    Maribell Martines NP   Radiation Oncology

## 2021-06-14 NOTE — LETTER
2021         RE: Quan Murphy   [address]  [facility] [address]        Dear Colleague,    Thank you for referring your patient, Quan Murphy, to the [facility] RADIATION ONCOLOGY. Please see a copy of my visit note below.    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  He is only 2 days out from treatment so has not noticed any improvement in his symptoms.  He continues to have a lot of phlegm.  Nutrition is 100% via PEG tube and they are not able to get in quite as much as recommended by dietitian.  They have not been weighing him but recommended they weigh him every couple of days and write it down keep track to see if he is losing weight.  His pain is adequately controlled and palliative care is working with him.  His wife does his tube feedings and also does free water feels like he has been getting a decent amount there.  He is very fatigued and spends a lot of his day sleeping.  He has only taken a few bites of food recommended that he eat a few bites and every day.  He states he is working on his swallowing exercises.  He continues to moisturize.  Did have labs done today and it looks like his creatinine is slightly elevated so encouraged to push fluids.  He is in a better mood today than when he was going through treatment.    ROS:  Complete review of systems is negative except for symptoms discussed in subjective above.    Current Outpatient Medications   Medication     ALPRAZolam (XANAX) 0.5 MG tablet     ASPIRIN ADULT LOW STRENGTH 81  MG EC tablet     citalopram (CELEXA) 40 MG tablet     doxepin (SINEQUAN) 10 MG/ML (HIGH CONC) solution     guaiFENesin (MUCINEX) 600 MG 12 hr tablet     HYDROcodone-acetaminophen (NORCO)  MG per tablet     hydrOXYzine (VISTARIL) 25 MG capsule     magic mouthwash (ENTER INGREDIENTS IN COMMENTS) suspension     methadone (DOLOPHINE) 5 MG tablet     metoprolol succinate ER (TOPROL-XL) 50 MG 24 hr tablet     naloxone (NARCAN) 4 MG/0.1ML nasal spray     nitroGLYcerin (NITROSTAT) 0.4 MG sublingual tablet     omeprazole (PRILOSEC) 40 MG DR capsule     ondansetron (ZOFRAN-ODT) 8 MG ODT tab     prochlorperazine (COMPAZINE) 10 MG tablet     prochlorperazine (COMPAZINE) 25 MG suppository     protein modular (PROSOURCE TF) LIQD     rosuvastatin (CRESTOR) 40 MG tablet     silver sulfADIAZINE (SILVADENE) 1 % external cream     testosterone (ANDROGEL 1.62 % PUMP) 20.25 MG/ACT gel     zolpidem (AMBIEN) 10 MG tablet     No current facility-administered medications for this visit.           Allergies   Allergen Reactions     Animal Dander      Azithromycin Nausea and Vomiting     Dust Mites      Pollen Extract      Smoke.        Past Medical History:   Diagnosis Date     Allergy, unspecified not elsewhere classified     Seasonal allergies, pollen, dust, smoke and animals     Antiplatelet or antithrombotic long-term use      Anxiety      Arthritis      Chest pain      Chronic sinusitis      Coronary atherosclerosis of unspecified type of vessel, native or graft     Coronary artery disease     Hyperlipidemia      Hypertension      Inguinal hernia      Kidney stones      Malignant neoplasm of prostate (H)     Prostate cancer     Prostate cancer (H)          PHYSICAL EXAM:  Gen: Alert, in NAD.  He was sleeping when I called him but he did wake up and seemed in good spirits.        LABS AND IMAGING:  Reviewed.    IMPRESSION:   Mr. Murphy is a 70 year old male with a squamous cell carcinoma of the left tonsil s/p chemoradiation now 2  days out since completion of treatment and is stable and it is too soon to see any improvement in his acute side effects.    PLAN:   1. Follow-up with me next week.  Follow up with Dr. Whiteside in 1 month.   Continue close follow up with ENT and medical oncology.  2. Continue to moisturize with aquaphor and when skin is completely healed can change to preferred moisturizer.  Discussed importance of sun avoidance or sun protection.  3. Fatigue should continue to improve.  4. Continue oral cares with salt and soda rinses.  Routine dental follow up at least every 6 months.  Continue to use fluoride trays or fluoride treatments. Continue jaw, neck and swallowing exercises.  5. Treatment related pain should continue to diminish.  Recommended to slowly wean off pain medications when pain decreases.  He will be on a weaning schedule per palliative care.  His wife did state that she thinks he might be taking a little extra pain medication.  I did tell Quan that if he is taking any additional pain medication he has been a run out early and he will not be given any additional meds in addition to it not being safe.  6. Continue to push fluids and caloric intake to maintain weight.  Try to increase oral intake and decrease calories through PEG tube.  Push fluids as creatinine is slightly elevated.  Encouraged New to eat some food by mouth everyday.  I want him to weigh himself at home every 2 days and record his weight.  Report back in next week with weights.        Time on phone:  23 minutes    Maribell Martines NP   Radiation Oncology

## 2021-06-14 NOTE — PROGRESS NOTES
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 Christensen). Path: SCC, non keratinizing, p16 +gardenia  3/23/21 CT neck. 2.9 x 2.7 x 3.5 cm L tonsil fullness involving soft palate, level 2-3 neck nodes  4/2/21 PET/CT. 2.7 x 2.2 x 2.7 cm L palatine tonsil mass (SUV 24), extension to oral cavity, 3.4 x 2.0 cm L level 4 node invading SCM, 1.8 x 1.8 cm L level 2A/3 node (SUV 7.9) w/ECS, 1.4 x 0.9 cm R level 2A node (SUV 6.7)  4/28~6/12/21  Chemoradiation with weekly cisplatin. No HD cisplatin due to CKD. Cisplatin held 6/2/21 due to malnutrition, SURESH  5/21/21 [facility] ED for fever to 102. No localizing source, cultures negative, not neutropenic  6/2~6/9/21 [facility] for malnutrition, inability to tolerate PO, SURESH. C/b respiratory arrest due to opioids, aspiration pneumonia, anxiety    ASSESSMENT AND PLAN   SCC L tonsil, [id number], p16 +gardenia, ECS +gardenia: Completed chemoradiation, interruptions as above and as summarized in Dr. Robbins's notes. I reviewed the anticipated course for recovery. Unfortunately, he's not likely to agree to come to the  in the next couple of weeks so we'll have to try to do our best. Has follow up with Dr. Robbins 7/30 with CT. 3 month PET/CT already scheduled 9/3. I will see him afterward that day in addition to Dr. Fang and Dr. Robbins's appointments. Continue weekly visits for now, but will change to video as again, [place] willing [place] [facility] [place].     Dysphagia: Scheduled to see Reba Oshea today, but he's  not willing to come to the [facility]. Messaged her to see what other options we might have. I requested to have an appt with her on 7/2 when he's coming down for an appt with Dr. Serrano.     Gtube site infection: Some pain when manipulating the tube, tenderness and a little green discharge. Amox-clav x 7 days. Monitor for diarrhea.     Secretions: Outlined anticipated course. Change guaifenesin to liquid instead of capsules    Malnutrition, weight loss: Tube feedings doing ok, getting in 4 cartons per day, doing bolus feedings without difficulty, trying to increase to 5.     Constipation: Not discussed today.    Anticipated acute on chronic opioid use: Working with Dr. Serrano on this; see his note for anticipated weaning off and timeframe.     CKD: Creatinine stable     ASCVD:  See note by Dr. Ch, Cardiology, from 10/20/20. Monitor BP; if persistently on the lower side, will have him follow up with his PCP or Dr. Ch; if that's going to be delayed, then will decrease lisinopril temporarily.     Review of the result(s) of each unique test - CMP, CBC, mg    30 minutes spent on the call.     Shanthi Schrader MD  Associate Professor of Medicine  Hematology, Oncology and Transplantation      SUBJECTIVE  New returns for follow up of L tonsil cancer. Completed chemoradiation this past Sat. Alessandra is doing the talking given the difficulty talking from mucositis. We had an in person visit but New declined to come down to the clinic today. He's also not able to do video visits   Doing bolus feedings so he has more time away from being connected. Goal is 5 cartons of Jevity. Still doing 2 cartons of Prosource. Pat has been asked to give him 2 liters of water in addition to the tube feedings.   Crushing meds that are able to be crushed   Not eating almost anything by mouth but sipping on liquids.   Alessandra is doing the talking because of the mucositis  Having some small amount of green drainage from the bottom part of the Gtube.  A little bit of redness in one corner that's improving. But it's tender when the tube is moving   Up and moving around all of the time, only sleeps 20 minutes at a time, even at night  Having 1-2 tiny BMs daily but Pat doesn't think he's constipated.   Had a really hard time in the hospital     PAST MEDICAL HISTORY  SCC as above  ASCVD. Angina in 12/2016. [facility] with proximal LAD stenosis, s/p PTCA and stent. Not on metoprolol due to hypotension. Ses Dr. Aramis Ch, Cardiologist.  CKD baseline ~ 1.2-1.3, up to 1.93 on 11/2/20  HTN  Dyslipidemia  H/o prostate ca s/p robotic prostatectomy about 16 years ago followed by Dr. Meir Torres at [facility] in [place]  Nephrolithasis  Anxiety, insomnia. Takes zolpidem and alprazolam nightly   OA  Chronic sinusitus. Takes chronic hydrocodone-acetaminophen for this  Inguinal hernia  GERD, hiatal hernia. Met with Dr. Adams 2/18/20, symptoms not due to hiatal hernia  ORIF R radius 2017  H/o retroperitoneal hematoma 10/2017. Admitted to [facility]. [place] to be related to lithotripsy 10 days prior  Hemorrhoids  PAD s/p R iliac artery stent after injury during [facility]    CURRENT OUTPATIENT MEDICATIONS  Current Outpatient Medications   Medication Sig Dispense Refill     ALPRAZolam (XANAX) 0.5 MG tablet Take 1 tablet (0.5 mg) by mouth 3 times daily as needed for anxiety  0     amoxicillin-clavulanate (AUGMENTIN-ES) 600-42.9 MG/5ML suspension Take 5 mLs (600 mg) by mouth 2 times daily 200 mL 0     ASPIRIN ADULT LOW STRENGTH 81 MG EC tablet TAKE ONE TABLET BY MOUTH ONCE DAILY 90 tablet 1     citalopram (CELEXA) 40 MG tablet TAKE ONE TABLET BY MOUTH ONCE DAILY 30 tablet 8     doxepin (SINEQUAN) 10 MG/ML (HIGH CONC) solution Mouth pain: take 1.5 mL mixed with equal amount tap water. Swish for 60 sec then spit. Take every 4 h as needed. Sleep: take 0.6 mL at night. May repeat this once more overnight. Do not take more than 2 xanax overnight. 118 mL 0     guaiFENesin (MUCINEX) 600 MG 12  hr tablet Take 2 tablets (1,200 mg) by mouth 2 times daily 60 tablet 1     guaiFENesin (ROBITUSSIN) 100 MG/5ML SYRP Take 10 mLs by mouth 2 times daily 473 mL 11     HYDROcodone-acetaminophen (NORCO)  MG per tablet Take 1-2 tablets by mouth every 4 hours as needed for severe pain (No more than 10 a day. Stop dilaudid.) 150 tablet 0     hydrOXYzine (VISTARIL) 25 MG capsule TAKE 1 CAPSULE (25 MG) BY MOUTH 4 TIMES DAILY AS NEEDED FOR ANXIETY 120 capsule 3     magic mouthwash (ENTER INGREDIENTS IN COMMENTS) suspension Take 10 mLs by mouth every 4 hours as needed (pain) Swish and spit 200 mL 1     methadone (DOLOPHINE) 5 MG tablet Take 1.5 tablets (7.5 mg) by mouth 3 times daily . NO LONGER TAKE IT 4 TIMES A DAY.  0     metoprolol succinate ER (TOPROL-XL) 50 MG 24 hr tablet Take 1 tablet (50 mg) by mouth daily 90 tablet 3     naloxone (NARCAN) 4 MG/0.1ML nasal spray Spray 1 spray (4 mg) into one nostril alternating nostrils as needed for opioid reversal every 2-3 minutes until assistance arrives 0.2 mL      nitroGLYcerin (NITROSTAT) 0.4 MG sublingual tablet For chest pain place 1 tablet under the tongue every 5 minutes for 3 doses. If symptoms persist 5 minutes after 1st dose call 911. 25 tablet 0     omeprazole (PRILOSEC) 40 MG DR capsule Take 1 capsule (40 mg) by mouth daily 90 capsule 3     ondansetron (ZOFRAN-ODT) 8 MG ODT tab Take 1 tablet (8 mg) by mouth every 8 hours as needed for nausea 30 tablet 11     prochlorperazine (COMPAZINE) 10 MG tablet Take 0.5 tablets (5 mg) by mouth every 6 hours as needed (Nausea/Vomiting) 30 tablet 11     prochlorperazine (COMPAZINE) 25 MG suppository Place 1 suppository (25 mg) rectally every 12 hours as needed for nausea 15 suppository 1     protein modular (PROSOURCE TF) LIQD 1 packet by Per Feeding Tube route 2 times daily 60 packet 0     rosuvastatin (CRESTOR) 40 MG tablet Take 1 tablet (40 mg) by mouth daily 90 tablet 1     silver sulfADIAZINE (SILVADENE) 1 % external  cream Apply topically daily 400 g 0     testosterone (ANDROGEL 1.62 % PUMP) 20.25 MG/ACT gel testosterone 20.25 mg/1.25 gram (1.62 %) transdermal gel pump       zolpidem (AMBIEN) 10 MG tablet TAKE ONE TABLET BY MOUTH EVERY EVENING AS NEEDED FOR SLEEP 30 tablet 5     ALLERGIES  Allergies   Allergen Reactions     Animal Dander      Azithromycin Nausea and Vomiting     Dust Mites      Pollen Extract      Smoke.       REVIEW OF SYSTEMS  As above in the HPI, o/w complete 12-point ROS was negative.    PHYSICAL EXAM  There were no vitals taken for this visit. due to virtual telephone visit   Wt Readings from Last 3 Encounters:   05/25/21 75 kg (165 lb 6.4 oz)   05/05/21 74.4 kg (164 lb)   05/05/21 74.8 kg (164 lb 14.4 oz)     GEN: NAD    Remainder of physical exam deferred due to public health emergency and limitations of telephone visit     LABORATORY AND IMAGING STUDIES  Component Date Value     WBC 06/14/2021 4.1      RBC Count 06/14/2021 3.49*     Hemoglobin 06/14/2021 10.0*     Hematocrit 06/14/2021 32.3*     MCV 06/14/2021 93      MCH 06/14/2021 28.7      MCHC 06/14/2021 31.0*     RDW 06/14/2021 16.4*     Platelet Count 06/14/2021 258      Diff Method 06/14/2021 Automated Method      % Neutrophils 06/14/2021 68.0      % Lymphocytes 06/14/2021 5.6      % Monocytes 06/14/2021 25.8      % Eosinophils 06/14/2021 0.2      % Basophils 06/14/2021 0.2      % Immature Granulocytes 06/14/2021 0.2      Nucleated RBCs 06/14/2021 0      Absolute Neutrophil 06/14/2021 2.8      Absolute Lymphocytes 06/14/2021 0.2*     Absolute Monocytes 06/14/2021 1.1      Absolute Eosinophils 06/14/2021 0.0      Absolute Basophils 06/14/2021 0.0      Abs Immature Granulocytes 06/14/2021 0.0      Absolute Nucleated RBC 06/14/2021 0.0      RBC Morphology 06/14/2021 Normal      Platelet Estimate 06/14/2021 Automated count confirmed.  Platelet morphology is normal.      Sodium 06/14/2021 136      Potassium 06/14/2021 4.5      Chloride 06/14/2021 98   "    Carbon Dioxide 06/14/2021 36*     Anion Gap 06/14/2021 2*     Glucose 06/14/2021 90      Urea Nitrogen 06/14/2021 21      Creatinine 06/14/2021 1.30*     GFR Estimate 06/14/2021 55*        GFR Estimate If Black 06/14/2021 64         Calcium 06/14/2021 9.3      Bilirubin Total 06/14/2021 0.3      Albumin 06/14/2021 2.4*     Protein Total 06/14/2021 6.3*     Alkaline Phosphatase 06/14/2021 53      ALT 06/14/2021 29      AST 06/14/2021 34      Magnesium 06/14/2021 1.8      Labs were independently reviewed and interpreted by me      New is a 70 year old who is being evaluated via a billable telephone visit.      What phone number would you like to be contacted at? [phone]  How would you like to obtain your AVS? MyChart   Vitals - Patient Reported  Weight (Patient Reported): 93 kg (205 lb)  Height (Patient Reported): 174 cm (5' 8.5\")  BMI (Based on Pt Reported Ht/Wt): 30.72  Pain Score: Severe Pain (7)  Pain Loc: Other - see comment(TUBE IN HIS THROAT)    Lauren Jensen MA     "

## 2021-06-15 ENCOUNTER — HOME INFUSION (PRE-WILLOW HOME INFUSION) (OUTPATIENT)
Dept: PHARMACY | Facility: CLINIC | Age: 71
End: 2021-06-15

## 2021-06-15 ENCOUNTER — OFFICE VISIT (OUTPATIENT)
Dept: INTERNAL MEDICINE | Facility: CLINIC | Age: 71
End: 2021-06-15
Payer: MEDICARE

## 2021-06-15 ENCOUNTER — VIRTUAL VISIT (OUTPATIENT)
Dept: ONCOLOGY | Facility: CLINIC | Age: 71
End: 2021-06-15
Attending: INTERNAL MEDICINE
Payer: MEDICARE

## 2021-06-15 VITALS
OXYGEN SATURATION: 91 % | BODY MASS INDEX: 30.27 KG/M2 | DIASTOLIC BLOOD PRESSURE: 54 MMHG | WEIGHT: 202 LBS | HEART RATE: 90 BPM | TEMPERATURE: 95.8 F | SYSTOLIC BLOOD PRESSURE: 96 MMHG | RESPIRATION RATE: 18 BRPM

## 2021-06-15 DIAGNOSIS — D61.818 PANCYTOPENIA (H): ICD-10-CM

## 2021-06-15 DIAGNOSIS — Z95.5 STATUS POST INSERTION OF DRUG-ELUTING STENT INTO LEFT ANTERIOR DESCENDING ARTERY FOR CORONARY ARTERY DISEASE: ICD-10-CM

## 2021-06-15 DIAGNOSIS — N18.31 STAGE 3A CHRONIC KIDNEY DISEASE (H): ICD-10-CM

## 2021-06-15 DIAGNOSIS — R62.7 FAILURE TO THRIVE IN ADULT: ICD-10-CM

## 2021-06-15 DIAGNOSIS — E87.8 ELECTROLYTE IMBALANCE: ICD-10-CM

## 2021-06-15 DIAGNOSIS — F41.9 ANXIETY: ICD-10-CM

## 2021-06-15 DIAGNOSIS — N17.9 ACUTE RENAL FAILURE, UNSPECIFIED ACUTE RENAL FAILURE TYPE (H): ICD-10-CM

## 2021-06-15 DIAGNOSIS — R09.2 RESPIRATORY ARREST (H): ICD-10-CM

## 2021-06-15 DIAGNOSIS — L08.9 LOCAL INFECTION OF SKIN AND SUBCUTANEOUS TISSUE: ICD-10-CM

## 2021-06-15 DIAGNOSIS — C09.9 SQUAMOUS CELL CARCINOMA OF LEFT TONSIL (H): ICD-10-CM

## 2021-06-15 DIAGNOSIS — G47.01 SLEEP DISORDER DUE TO A GENERAL MEDICAL CONDITION, INSOMNIA TYPE: Primary | ICD-10-CM

## 2021-06-15 DIAGNOSIS — C09.9 SQUAMOUS CELL CARCINOMA OF LEFT TONSIL (H): Primary | ICD-10-CM

## 2021-06-15 PROCEDURE — 99443 PR PHYSICIAN TELEPHONE EVALUATION 21-30 MIN: CPT | Performed by: INTERNAL MEDICINE

## 2021-06-15 PROCEDURE — 999N001193 HC VIDEO/TELEPHONE VISIT; NO CHARGE

## 2021-06-15 PROCEDURE — 99495 TRANSJ CARE MGMT MOD F2F 14D: CPT | Performed by: INTERNAL MEDICINE

## 2021-06-15 RX ORDER — AMOXICILLIN AND CLAVULANATE POTASSIUM 600; 42.9 MG/5ML; MG/5ML
600 POWDER, FOR SUSPENSION ORAL 2 TIMES DAILY
Qty: 200 ML | Refills: 0 | Status: SHIPPED | OUTPATIENT
Start: 2021-06-15 | End: 2022-02-02

## 2021-06-15 ASSESSMENT — PAIN SCALES - GENERAL: PAINLEVEL: NO PAIN (0)
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
## 2021-06-15 NOTE — LETTER
6/15/2021         RE: Quan Murphy  [address] [facility] [address]        Dear Colleague,    Thank you for referring your patient, Quan Murphy, to the [facility]. Please see a copy of my visit note below.       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 Christensen). Path: SCC, non keratinizing, p16 +gardenia  3/23/21 CT neck. 2.9 x 2.7 x 3.5 cm L tonsil fullness involving soft palate, level 2-3 neck nodes  4/2/21 PET/CT. 2.7 x 2.2 x 2.7 cm L palatine tonsil mass (SUV 24), extension to oral cavity, 3.4 x 2.0 cm L level 4 node invading SCM, 1.8 x 1.8 cm L level 2A/3 node (SUV 7.9) w/ECS, 1.4 x 0.9 cm R level 2A node (SUV 6.7)  4/28~6/12/21  Chemoradiation with weekly cisplatin. No HD cisplatin due to CKD. Cisplatin held 6/2/21 due to malnutrition, SURESH  5/21/21 [facility] ED for fever to 102. No localizing source, cultures negative, not neutropenic  6/2~6/9/21 [facility] for malnutrition, inability to tolerate PO, SURESH. C/b respiratory arrest due to opioids, aspiration pneumonia, anxiety    ASSESSMENT AND PLAN   SCC L tonsil, [id number], p16 +gardenia, ECS +gardenia: Completed chemoradiation, interruptions as above and as summarized in Dr. Robbins's notes. I reviewed the anticipated course for recovery. Unfortunately, he's not likely to agree to come to the [facility] in the next couple of weeks so we'll have to try to do our best. Has follow up with Dr. Robbins 7/30 with CT. 3 month PET/CT already scheduled 9/3. I will see him afterward that day in  addition to Dr. Fang and Dr. Robbins's appointments. Continue weekly visits for now, but will change to video as again, he's not willing to come down to the [facility] for at least a couple of weeks.     Dysphagia: Scheduled to see Reba Oshea today, but he's not willing to come to the [facility]. Messaged her to see what other options we might have. I requested to have an appt with her on 7/2 when he's coming down for an appt with Dr. Serrano.     Gtube site infection: Some pain when manipulating the tube, tenderness and a little green discharge. Amox-clav x 7 days. Monitor for diarrhea.     Secretions: Outlined anticipated course. Change guaifenesin to liquid instead of capsules    Malnutrition, weight loss: Tube feedings doing ok, getting in 4 cartons per day, doing bolus feedings without difficulty, trying to increase to 5.     Constipation: Not discussed today.    Anticipated acute on chronic opioid use: Working with Dr. Serrano on this; see his note for anticipated weaning off and timeframe.     CKD: Creatinine stable     ASCVD:  See note by Dr. Ch, Cardiology, from 10/20/20. Monitor BP; if persistently on the lower side, will have him follow up with his PCP or Dr. Ch; if that's going to be delayed, then will decrease lisinopril temporarily.     Review of the result(s) of each unique test - CMP, CBC, mg    30 minutes spent on the call.     Shanthi Schrader MD  Associate Professor of Medicine  Hematology, Oncology and Transplantation      SUBJECTIVE  New returns for follow up of L tonsil cancer. Completed chemoradiation this past Sat. Alessandra is doing the talking given the difficulty talking from mucositis. We had an in person visit but New declined to come down to the clinic today. He's also not able to do video visits   Doing bolus feedings so he has more time away from being connected. Goal is 5 cartons of Jevity. Still doing 2 cartons of Prosource. Pat has been asked to give him 2 liters of water in addition  to the tube feedings.   Crushing meds that are able to be crushed   Not eating almost anything by mouth but sipping on liquids.   Alessandra is doing the talking because of the mucositis  Having some small amount of green drainage from the bottom part of the Gtube. A little bit of redness in one corner that's improving. But it's tender when the tube is moving   Up and moving around all of the time, only sleeps 20 minutes at a time, even at night  Having 1-2 tiny BMs daily but Pat doesn't think he's constipated.   Had a really hard time in the hospital     PAST MEDICAL HISTORY  SCC as above  ASCVD. Angina in 12/2016. [facility] with proximal LAD stenosis, s/p PTCA and stent. Not on metoprolol due to hypotension. Ses Dr. Aramis Ch, Cardiologist.  CKD baseline ~ 1.2-1.3, up to 1.93 on 11/2/20  HTN  Dyslipidemia  H/o prostate ca s/p robotic prostatectomy about 16 years ago followed by Dr. Meir Torres at [facility] in [place]  Nephrolithasis  Anxiety, insomnia. Takes zolpidem and alprazolam nightly   OA  Chronic sinusitus. Takes chronic hydrocodone-acetaminophen for this  Inguinal hernia  GERD, hiatal hernia. Met with Dr. Adams 2/18/20, symptoms not due to hiatal hernia  ORIF R radius 2017  H/o retroperitoneal hematoma 10/2017. Admitted to [facility]. [place] to be related to lithotripsy 10 days prior  Hemorrhoids  PAD s/p R iliac artery stent after injury during [facility]    CURRENT OUTPATIENT MEDICATIONS  Current Outpatient Medications   Medication Sig Dispense Refill     ALPRAZolam (XANAX) 0.5 MG tablet Take 1 tablet (0.5 mg) by mouth 3 times daily as needed for anxiety  0     amoxicillin-clavulanate (AUGMENTIN-ES) 600-42.9 MG/5ML suspension Take 5 mLs (600 mg) by mouth 2 times daily 200 mL 0     ASPIRIN ADULT LOW STRENGTH 81 MG EC tablet TAKE ONE TABLET BY MOUTH ONCE DAILY 90 tablet 1     citalopram (CELEXA) 40 MG tablet TAKE ONE TABLET BY MOUTH ONCE DAILY 30 tablet 8     doxepin (SINEQUAN) 10 MG/ML (HIGH CONC) solution Mouth  pain: take 1.5 mL mixed with equal amount tap water. Swish for 60 sec then spit. Take every 4 h as needed. Sleep: take 0.6 mL at night. May repeat this once more overnight. Do not take more than 2 xanax overnight. 118 mL 0     guaiFENesin (MUCINEX) 600 MG 12 hr tablet Take 2 tablets (1,200 mg) by mouth 2 times daily 60 tablet 1     guaiFENesin (ROBITUSSIN) 100 MG/5ML SYRP Take 10 mLs by mouth 2 times daily 473 mL 11     HYDROcodone-acetaminophen (NORCO)  MG per tablet Take 1-2 tablets by mouth every 4 hours as needed for severe pain (No more than 10 a day. Stop dilaudid.) 150 tablet 0     hydrOXYzine (VISTARIL) 25 MG capsule TAKE 1 CAPSULE (25 MG) BY MOUTH 4 TIMES DAILY AS NEEDED FOR ANXIETY 120 capsule 3     magic mouthwash (ENTER INGREDIENTS IN COMMENTS) suspension Take 10 mLs by mouth every 4 hours as needed (pain) Swish and spit 200 mL 1     methadone (DOLOPHINE) 5 MG tablet Take 1.5 tablets (7.5 mg) by mouth 3 times daily . NO LONGER TAKE IT 4 TIMES A DAY.  0     metoprolol succinate ER (TOPROL-XL) 50 MG 24 hr tablet Take 1 tablet (50 mg) by mouth daily 90 tablet 3     naloxone (NARCAN) 4 MG/0.1ML nasal spray Spray 1 spray (4 mg) into one nostril alternating nostrils as needed for opioid reversal every 2-3 minutes until assistance arrives 0.2 mL      nitroGLYcerin (NITROSTAT) 0.4 MG sublingual tablet For chest pain place 1 tablet under the tongue every 5 minutes for 3 doses. If symptoms persist 5 minutes after 1st dose call 911. 25 tablet 0     omeprazole (PRILOSEC) 40 MG DR capsule Take 1 capsule (40 mg) by mouth daily 90 capsule 3     ondansetron (ZOFRAN-ODT) 8 MG ODT tab Take 1 tablet (8 mg) by mouth every 8 hours as needed for nausea 30 tablet 11     prochlorperazine (COMPAZINE) 10 MG tablet Take 0.5 tablets (5 mg) by mouth every 6 hours as needed (Nausea/Vomiting) 30 tablet 11     prochlorperazine (COMPAZINE) 25 MG suppository Place 1 suppository (25 mg) rectally every 12 hours as needed for nausea  15 suppository 1     protein modular (PROSOURCE TF) LIQD 1 packet by Per Feeding Tube route 2 times daily 60 packet 0     rosuvastatin (CRESTOR) 40 MG tablet Take 1 tablet (40 mg) by mouth daily 90 tablet 1     silver sulfADIAZINE (SILVADENE) 1 % external cream Apply topically daily 400 g 0     testosterone (ANDROGEL 1.62 % PUMP) 20.25 MG/ACT gel testosterone 20.25 mg/1.25 gram (1.62 %) transdermal gel pump       zolpidem (AMBIEN) 10 MG tablet TAKE ONE TABLET BY MOUTH EVERY EVENING AS NEEDED FOR SLEEP 30 tablet 5     ALLERGIES  Allergies   Allergen Reactions     Animal Dander      Azithromycin Nausea and Vomiting     Dust Mites      Pollen Extract      Smoke.       REVIEW OF SYSTEMS  As above in the HPI, o/w complete 12-point ROS was negative.    PHYSICAL EXAM  There were no vitals taken for this visit. due to virtual telephone visit   Wt Readings from Last 3 Encounters:   05/25/21 75 kg (165 lb 6.4 oz)   05/05/21 74.4 kg (164 lb)   05/05/21 74.8 kg (164 lb 14.4 oz)     GEN: NAD    Remainder of physical exam deferred due to public health emergency and limitations of telephone visit     LABORATORY AND IMAGING STUDIES  Component Date Value     WBC 06/14/2021 4.1      RBC Count 06/14/2021 3.49*     Hemoglobin 06/14/2021 10.0*     Hematocrit 06/14/2021 32.3*     MCV 06/14/2021 93      MCH 06/14/2021 28.7      MCHC 06/14/2021 31.0*     RDW 06/14/2021 16.4*     Platelet Count 06/14/2021 258      Diff Method 06/14/2021 Automated Method      % Neutrophils 06/14/2021 68.0      % Lymphocytes 06/14/2021 5.6      % Monocytes 06/14/2021 25.8      % Eosinophils 06/14/2021 0.2      % Basophils 06/14/2021 0.2      % Immature Granulocytes 06/14/2021 0.2      Nucleated RBCs 06/14/2021 0      Absolute Neutrophil 06/14/2021 2.8      Absolute Lymphocytes 06/14/2021 0.2*     Absolute Monocytes 06/14/2021 1.1      Absolute Eosinophils 06/14/2021 0.0      Absolute Basophils 06/14/2021 0.0      Abs Immature Granulocytes 06/14/2021 0.0       "Absolute Nucleated RBC 06/14/2021 0.0      RBC Morphology 06/14/2021 Normal      Platelet Estimate 06/14/2021 Automated count confirmed.  Platelet morphology is normal.      Sodium 06/14/2021 136      Potassium 06/14/2021 4.5      Chloride 06/14/2021 98      Carbon Dioxide 06/14/2021 36*     Anion Gap 06/14/2021 2*     Glucose 06/14/2021 90      Urea Nitrogen 06/14/2021 21      Creatinine 06/14/2021 1.30*     GFR Estimate 06/14/2021 55*        GFR Estimate If Black 06/14/2021 64         Calcium 06/14/2021 9.3      Bilirubin Total 06/14/2021 0.3      Albumin 06/14/2021 2.4*     Protein Total 06/14/2021 6.3*     Alkaline Phosphatase 06/14/2021 53      ALT 06/14/2021 29      AST 06/14/2021 34      Magnesium 06/14/2021 1.8      Labs were independently reviewed and interpreted by me      New is a 70 year old who is being evaluated via a billable telephone visit.      What phone number would you like to be contacted at? [phone]  How would you like to obtain your AVS? MyChart   Vitals - Patient Reported  Weight (Patient Reported): 93 kg (205 lb)  Height (Patient Reported): 174 cm (5' 8.5\")  BMI (Based on Pt Reported Ht/Wt): 30.72  Pain Score: Severe Pain (7)  Pain Loc: Other - see comment(TUBE IN HIS THROAT)    Lauren Jensen MA         Again, thank you for allowing me to participate in the care of your patient.        Sincerely,        Shanthi Schrader MD    "

## 2021-06-16 ENCOUNTER — ONCOLOGY VISIT (OUTPATIENT)
Dept: RADIATION ONCOLOGY | Facility: CLINIC | Age: 71
End: 2021-06-16

## 2021-06-16 ENCOUNTER — VIRTUAL VISIT (OUTPATIENT)
Dept: SPEECH THERAPY | Facility: CLINIC | Age: 71
End: 2021-06-16
Payer: MEDICARE

## 2021-06-16 DIAGNOSIS — R13.12 OROPHARYNGEAL DYSPHAGIA: Primary | ICD-10-CM

## 2021-06-16 DIAGNOSIS — C09.9 SQUAMOUS CELL CARCINOMA OF TONSIL (H): ICD-10-CM

## 2021-06-16 PROCEDURE — 92526 ORAL FUNCTION THERAPY: CPT | Mod: GN | Performed by: SPEECH-LANGUAGE PATHOLOGIST

## 2021-06-16 NOTE — PROGRESS NOTES
This is a recent snapshot of the patient's [facility] medical record.  For current drug dose and complete information and questions, call [phone] or In Basket pool, fv home infusion ([id number])  CSN Number:  [id number]

## 2021-06-16 NOTE — PROGRESS NOTES
Quan Murphy is a 70 year old male who is being seen via a billable video visit.      Patient has given verbal consent for Video visit? Yes    Video Start Time: 0849    Telehealth Visit Details    Type of Service:  Telehealth    Video End Time (time video stopped): 0910    Originating Location (pt. location): Home    Additional Participants in Telehealth Visit: Pt's wife present    Distant Location (provider location[facility]     Mode of Communication (Audio Visual or Audio Only):  Audio Visual    Reba Oshea, SLP  June 16, 2021

## 2021-06-18 DIAGNOSIS — E83.42 HYPOMAGNESEMIA: ICD-10-CM

## 2021-06-18 DIAGNOSIS — C09.9 SQUAMOUS CELL CARCINOMA OF LEFT TONSIL (H): ICD-10-CM

## 2021-06-18 LAB
ALBUMIN SERPL-MCNC: 2.3 G/DL (ref 3.4–5)
ALP SERPL-CCNC: 52 U/L (ref 40–150)
ALT SERPL W P-5'-P-CCNC: 54 U/L (ref 0–70)
ANION GAP SERPL CALCULATED.3IONS-SCNC: 4 MMOL/L (ref 3–14)
AST SERPL W P-5'-P-CCNC: 54 U/L (ref 0–45)
BASOPHILS # BLD AUTO: 0 10E9/L (ref 0–0.2)
BASOPHILS NFR BLD AUTO: 0.2 %
BILIRUB SERPL-MCNC: 0.3 MG/DL (ref 0.2–1.3)
BUN SERPL-MCNC: 22 MG/DL (ref 7–30)
CALCIUM SERPL-MCNC: 8.7 MG/DL (ref 8.5–10.1)
CHLORIDE SERPL-SCNC: 97 MMOL/L (ref 94–109)
CO2 SERPL-SCNC: 34 MMOL/L (ref 20–32)
CREAT SERPL-MCNC: 1.16 MG/DL (ref 0.66–1.25)
DIFFERENTIAL METHOD BLD: ABNORMAL
EOSINOPHIL # BLD AUTO: 0 10E9/L (ref 0–0.7)
EOSINOPHIL NFR BLD AUTO: 0.2 %
ERYTHROCYTE [DISTWIDTH] IN BLOOD BY AUTOMATED COUNT: 16.2 % (ref 10–15)
GFR SERPL CREATININE-BSD FRML MDRD: 64 ML/MIN/{1.73_M2}
GLUCOSE SERPL-MCNC: 117 MG/DL (ref 70–99)
HCT VFR BLD AUTO: 31.2 % (ref 40–53)
HGB BLD-MCNC: 9.7 G/DL (ref 13.3–17.7)
IMM GRANULOCYTES # BLD: 0 10E9/L (ref 0–0.4)
IMM GRANULOCYTES NFR BLD: 0.6 %
LYMPHOCYTES # BLD AUTO: 0.4 10E9/L (ref 0.8–5.3)
LYMPHOCYTES NFR BLD AUTO: 7.3 %
MAGNESIUM SERPL-MCNC: 2.1 MG/DL (ref 1.6–2.3)
MCH RBC QN AUTO: 28.8 PG (ref 26.5–33)
MCHC RBC AUTO-ENTMCNC: 31.1 G/DL (ref 31.5–36.5)
MCV RBC AUTO: 93 FL (ref 78–100)
MONOCYTES # BLD AUTO: 1.1 10E9/L (ref 0–1.3)
MONOCYTES NFR BLD AUTO: 21.4 %
NEUTROPHILS # BLD AUTO: 3.6 10E9/L (ref 1.6–8.3)
NEUTROPHILS NFR BLD AUTO: 70.3 %
NRBC # BLD AUTO: 0 10*3/UL
NRBC BLD AUTO-RTO: 0 /100
PLATELET # BLD AUTO: 331 10E9/L (ref 150–450)
PLATELET # BLD EST: ABNORMAL 10*3/UL
POTASSIUM SERPL-SCNC: 4.1 MMOL/L (ref 3.4–5.3)
PROT SERPL-MCNC: 6.6 G/DL (ref 6.8–8.8)
RBC # BLD AUTO: 3.37 10E12/L (ref 4.4–5.9)
RBC MORPH BLD: NORMAL
SODIUM SERPL-SCNC: 135 MMOL/L (ref 133–144)
WBC # BLD AUTO: 5.1 10E9/L (ref 4–11)

## 2021-06-18 PROCEDURE — 83735 ASSAY OF MAGNESIUM: CPT | Performed by: INTERNAL MEDICINE

## 2021-06-18 PROCEDURE — 36415 COLL VENOUS BLD VENIPUNCTURE: CPT | Performed by: INTERNAL MEDICINE

## 2021-06-18 PROCEDURE — 85025 COMPLETE CBC W/AUTO DIFF WBC: CPT | Performed by: INTERNAL MEDICINE

## 2021-06-18 PROCEDURE — 80053 COMPREHEN METABOLIC PANEL: CPT | Performed by: INTERNAL MEDICINE

## 2021-06-18 NOTE — PROCEDURES
Radiotherapy Treatment Summary          Date of Report: 2021     PATIENT: CHUCHO MURPHY  MEDICAL RECORD NO: [id number]  : 1950     DIAGNOSIS: C09.0 Malignant neoplasm of tonsillar fossa  INTENT OF RADIOTHERAPY: Curative (primary)  PATHOLOGY: p16-positive squamous cell carcinoma  STAGE: cT2 N2 M0   CONCURRENT SYSTEMIC THERAPY: Cisplatin 40 mg/m2     Details of the treatments summarized below are found in records kept in the Department of Radiation Oncology at [facility].     Treatment Summary:  Treatment Site Dose  Modality From  To  Elapsed Days Fx.  Gross disease   6,996 cGy 06 X   4/28/2021  2021  45  33  Right level II node 6,600 cGy 06 X   4/28/2021  2021  45  33  High risk tissues 6,300 cGy 06 X   4/28/2021  2021  45  33  Elective lymphatics 5,700 cGy 06 X   4/28/2021  2021  45  33     Dose per Fraction:   Gross disease: 212 cGy  Right level II node: 200 cGy  High risk tissues: 191 cGy  Elective lymphatics: 173 cGy     COMMENTS:  Mr. Murphy is a 70 year old gentleman who was diagnosed with a stage II p16-positive squamous cell carcinoma of the left tonsil in 3/2021 after he presented with a 1-month history of left-sided otalgia and globus sensation. Staging evaluation revealed a 2.7 x 2.2 x 2.7 cm left tonsil primary tumor with multiple involved cervical lymph nodes including a 1.8 cm left level II/III node, a 3.4 cm left level IV node and a suspicious 1.4 cm contralateral right level II node. He received curative-intent chemoradiotherapy as described above.     The primary tumor and grossly-involved ipsilateral nodes received a dose of 70 Gy in 33 once-daily fractions using 6 MV photons delivered via a 3 non-coplanar volumetric modulated arc therapy technique. The indeterminate right level II node received 66 Gy; the high risk tissues, consisting of the surrounding left oropharyngeal mucosa and the remainder of left levels II-IV and right level II, received 63 Gy; and the  elective lymphatics, defined as left level V, right levels III-V and the bilateral retropharyngeal regions, received 57 Gy using a simultaneous integrated boost technique. Weekly cisplatin 40 mg/m2 was administered for radiosensitization purposes.      Mr. Murphy developed progressive radiation-induced mucositis and dermatitis during treatment. His mucositis was managed with opioid narcotics for pain control with frequent salt/soda rinses used to treat his oral secretions. He had a history of long-standing narcotic use secondary to chronic pain and the palliative medicine team was consulted prior to treatment for assistance in pain management. Adequate pain control was challenging during his treatment course and he ultimately was placed on a combination of long-acting methadone with PRN [place] for breakthrough symptoms. His dermatitis was managed with BID/TID Aquaphor with BID Silvadene added towards the end of therapy to the areas of moist desquamation.     Mr. Murphy underwent reactive PEG placement on 6/7/2021 after he was unable to maintain adequate nutrition by mouth. He remained PEG dependent at the end of therapy for all of his nutritional needs with only small sips of liquids by mouth. He lost approximately 12.5 kg throughout therapy. Mr. Murphy was seen in the ED on 5/12/2021 after he presented with low-grade fevers and tachycardia. A work-up was negative and his symptoms resolved with IV fluid support and pain medications. He was hospitalized from 6/2/2021 - 6/9/2021 with failure to thrive after he was unable to maintain adequate caloric/fluid intake by mouth and he was treated with supportive cares and underwent expedited PEG placement. During his hospitalization, he had a respiratory arrest on 6/4/2021 which was felt to be secondary to polypharmacy. He recovered uneventfully, had the PEG placed on 6/7/2021 as previously described, and was discharged home on 6/9/2021.      Mr. Murphy received 5  infusions with weekly cisplatin. His infusions were held after 5/27/2021 due to deconditioning. He did have two separate 1-day breaks in his radiotherapy course, the first between fractions 23-24 secondary to the Memorial Day holiday and the second between fractions 24-25 secondary to his hospital admission. Both missed fractions were made up over the following weekends and he otherwise did not have any additional breaks in treatment.     Acute Toxicity Profile (CTCAE v5.0)  Mucositis: Grade 3  Dermatitis: Grade 3     PAIN MANAGEMENT:   Continue methadone 7.5 mg TID  Continue Norco 10 mg tabs q4h as need for breakthrough pain  Titrate pain medications as able following improvement of radiation-induced mucositis     FOLLOW UP PLAN:   Follow-up in radiation oncology clinic with NP in 1 week and weekly thereafter as needed for symptom assessment  Follow-up in ENT multiD clinic in 6 weeks with repeat CT neck      Staff Physician: Ahmet Robbins MD, PhD  Physicist: Kalpesh Bowens PhD     CC:   Ligia Ramires RN                                 Radiation Oncology:  [facility] [address] [place] [address]

## 2021-06-18 NOTE — PROGRESS NOTES
"New is a 70 year old who is being evaluated via a billable telephone visit.      What phone number would you like to be contacted at? [phone]  How would you like to obtain your AVS? 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 Christensen). Path: SCC, non keratinizing, p16 +gardenia  3/23/21              CT neck. 2.9 x 2.7 x 3.5 cm L tonsil fullness involving soft palate, level 2-3 neck nodes  4/2/21                PET/CT. 2.7 x 2.2 x 2.7 cm L palatine tonsil mass (SUV 24), extension to oral cavity, 3.4 x 2.0 cm L level 4 node invading SCM, 1.8 x 1.8 cm L level 2A/3 node (SUV 7.9) w/ECS, 1.4 x 0.9 cm R level 2A node (SUV 6.7)  4/28~6/12/21     Chemoradiation with weekly cisplatin. No HD cisplatin due to CKD. Cisplatin held 6/2/21 due to malnutrition, SURESH  5/21/21              [facility] ED for fever to 102. No localizing source, cultures negative, not neutropenic  6/2~6/9/21         [facility] for malnutrition, inability to tolerate PO, SURESH. C/b respiratory arrest due to opioids, aspiration pneumonia, anxiety     SUBJECTIVE  This past week has been \"great\". He is feeling happy to be home, being in the hospital was really hard for him. He now is relaxed at home. He is taking his pain medication as prescribed and working with palliative " care. He is getting his goal amt of 5 cans jevity and 2 pro-source. Maribell recommended he add a scoop of 1340 powder to each scoop. He is sipping water and doing his swallowing exercises despite throat discomfort. He denies feelings of dehydration. His BM's remain sporadic but he is managing with miralax and senna without issue. He comments he has cut back on ambien and Xanax. G tube site is still somewhat red but goopey drainage and tenderness has resolved with antibiotic. No f/c/breathing concerns.     10 point review of systems otherwise negative    CURRENT OUTPATIENT MEDICATIONS  Current Outpatient Medications   Medication Sig Dispense Refill     ALPRAZolam (XANAX) 0.5 MG tablet Take 1 tablet (0.5 mg) by mouth 3 times daily as needed for anxiety  0     amoxicillin-clavulanate (AUGMENTIN-ES) 600-42.9 MG/5ML suspension Take 5 mLs (600 mg) by mouth 2 times daily 200 mL 0     ASPIRIN ADULT LOW STRENGTH 81 MG EC tablet TAKE ONE TABLET BY MOUTH ONCE DAILY 90 tablet 1     citalopram (CELEXA) 40 MG tablet TAKE ONE TABLET BY MOUTH ONCE DAILY 30 tablet 8     doxepin (SINEQUAN) 10 MG/ML (HIGH CONC) solution Mouth pain: take 1.5 mL mixed with equal amount tap water. Swish for 60 sec then spit. Take every 4 h as needed. Sleep: take 0.6 mL at night. May repeat this once more overnight. Do not take more than 2 xanax overnight. 118 mL 0     guaiFENesin (MUCINEX) 600 MG 12 hr tablet Take 2 tablets (1,200 mg) by mouth 2 times daily 60 tablet 1     guaiFENesin (ROBITUSSIN) 100 MG/5ML SYRP Take 10 mLs by mouth 2 times daily 473 mL 11     HYDROcodone-acetaminophen (NORCO)  MG per tablet Take 1-2 tablets by mouth every 4 hours as needed for severe pain (No more than 10 a day. Stop dilaudid.) 150 tablet 0     hydrOXYzine (VISTARIL) 25 MG capsule TAKE 1 CAPSULE (25 MG) BY MOUTH 4 TIMES DAILY AS NEEDED FOR ANXIETY 120 capsule 3     magic mouthwash (ENTER INGREDIENTS IN COMMENTS) suspension Take 10 mLs by mouth every 4 hours as  needed (pain) Swish and spit 200 mL 1     methadone (DOLOPHINE) 5 MG tablet Take 1.5 tablets (7.5 mg) by mouth 3 times daily . NO LONGER TAKE IT 4 TIMES A DAY.  0     metoprolol succinate ER (TOPROL-XL) 50 MG 24 hr tablet Take 1 tablet (50 mg) by mouth daily 90 tablet 3     naloxone (NARCAN) 4 MG/0.1ML nasal spray Spray 1 spray (4 mg) into one nostril alternating nostrils as needed for opioid reversal every 2-3 minutes until assistance arrives 0.2 mL      nitroGLYcerin (NITROSTAT) 0.4 MG sublingual tablet For chest pain place 1 tablet under the tongue every 5 minutes for 3 doses. If symptoms persist 5 minutes after 1st dose call 911. 25 tablet 0     omeprazole (PRILOSEC) 40 MG DR capsule Take 1 capsule (40 mg) by mouth daily 90 capsule 3     ondansetron (ZOFRAN-ODT) 8 MG ODT tab Take 1 tablet (8 mg) by mouth every 8 hours as needed for nausea 30 tablet 11     prochlorperazine (COMPAZINE) 10 MG tablet Take 0.5 tablets (5 mg) by mouth every 6 hours as needed (Nausea/Vomiting) 30 tablet 11     prochlorperazine (COMPAZINE) 25 MG suppository Place 1 suppository (25 mg) rectally every 12 hours as needed for nausea 15 suppository 1     protein modular (PROSOURCE TF) LIQD 1 packet by Per Feeding Tube route 2 times daily 60 packet 0     rosuvastatin (CRESTOR) 40 MG tablet Take 1 tablet (40 mg) by mouth daily 90 tablet 1     silver sulfADIAZINE (SILVADENE) 1 % external cream Apply topically daily 400 g 0     testosterone (ANDROGEL 1.62 % PUMP) 20.25 MG/ACT gel testosterone 20.25 mg/1.25 gram (1.62 %) transdermal gel pump       zolpidem (AMBIEN) 10 MG tablet TAKE ONE TABLET BY MOUTH EVERY EVENING AS NEEDED FOR SLEEP 30 tablet 5     PHYSICAL EXAM  There were no vitals taken for this visit.  Wt Readings from Last 10 Encounters:   06/15/21 91.6 kg (202 lb)   06/10/21 93 kg (205 lb)   06/08/21 97.9 kg (215 lb 14.4 oz)   06/03/21 95.7 kg (211 lb)   06/02/21 94.7 kg (208 lb 11.2 oz)   06/01/21 94.8 kg (209 lb)   05/28/21 97.6 kg  "(215 lb 1.6 oz)   05/27/21 97.3 kg (214 lb 6.4 oz)   05/26/21 96.3 kg (212 lb 3.2 oz)   05/20/21 100.7 kg (222 lb)     Objective:  There were no vitals taken for this visit.  General: alert and no distress  Psych: Alert and oriented times; coherent speech, normal rate and volume, able to articulate logical thoughts, able to abstract reason, no tangential thoughts, no hallucinations or delusions  Patient's affect is appropriate.   Pulm: Speaking in full sentences, unlabored, no audible wheezes or cough.  The rest of a comprehensive physical examination is deferred due to PHE (public health emergency) video restrictions\"    LABORATORY AND IMAGING STUDIES     Ref. Range 6/18/2021 13:06   Sodium Latest Ref Range: 133 - 144 mmol/L 135   Potassium Latest Ref Range: 3.4 - 5.3 mmol/L 4.1   Chloride Latest Ref Range: 94 - 109 mmol/L 97   Carbon Dioxide Latest Ref Range: 20 - 32 mmol/L 34 (H)   Urea Nitrogen Latest Ref Range: 7 - 30 mg/dL 22   Creatinine Latest Ref Range: 0.66 - 1.25 mg/dL 1.16   GFR Estimate Latest Ref Range: >60 mL/min/1.73_m2 64   GFR Estimate If Black Latest Ref Range: >60 mL/min/1.73_m2 74   Calcium Latest Ref Range: 8.5 - 10.1 mg/dL 8.7   Anion Gap Latest Ref Range: 3 - 14 mmol/L 4   Magnesium Latest Ref Range: 1.6 - 2.3 mg/dL 2.1   Albumin Latest Ref Range: 3.4 - 5.0 g/dL 2.3 (L)   Protein Total Latest Ref Range: 6.8 - 8.8 g/dL 6.6 (L)   Bilirubin Total Latest Ref Range: 0.2 - 1.3 mg/dL 0.3   Alkaline Phosphatase Latest Ref Range: 40 - 150 U/L 52   ALT Latest Ref Range: 0 - 70 U/L 54   AST Latest Ref Range: 0 - 45 U/L 54 (H)   Glucose Latest Ref Range: 70 - 99 mg/dL 117 (H)   WBC Latest Ref Range: 4.0 - 11.0 10e9/L 5.1   Hemoglobin Latest Ref Range: 13.3 - 17.7 g/dL 9.7 (L)   Hematocrit Latest Ref Range: 40.0 - 53.0 % 31.2 (L)   Platelet Count Latest Ref Range: 150 - 450 10e9/L 331   RBC Count Latest Ref Range: 4.4 - 5.9 10e12/L 3.37 (L)   MCV Latest Ref Range: 78 - 100 fl 93   MCH Latest Ref Range: " 26.5 - 33.0 pg 28.8   MCHC Latest Ref Range: 31.5 - 36.5 g/dL 31.1 (L)   RDW Latest Ref Range: 10.0 - 15.0 % 16.2 (H)   Diff Method Unknown Automated Method   % Neutrophils Latest Units: % 70.3   % Lymphocytes Latest Units: % 7.3   % Monocytes Latest Units: % 21.4   % Eosinophils Latest Units: % 0.2   % Basophils Latest Units: % 0.2   % Immature Granulocytes Latest Units: % 0.6   Nucleated RBCs Latest Ref Range: 0 /100 0   Absolute Neutrophil Latest Ref Range: 1.6 - 8.3 10e9/L 3.6   Absolute Lymphocytes Latest Ref Range: 0.8 - 5.3 10e9/L 0.4 (L)   Absolute Monocytes Latest Ref Range: 0.0 - 1.3 10e9/L 1.1   Absolute Eosinophils Latest Ref Range: 0.0 - 0.7 10e9/L 0.0   Absolute Basophils Latest Ref Range: 0.0 - 0.2 10e9/L 0.0   Abs Immature Granulocytes Latest Ref Range: 0 - 0.4 10e9/L 0.0   Absolute Nucleated RBC Unknown 0.0   RBC Morphology Unknown Normal   Platelet Estimate Unknown Automated count confirmed.  Platelet morphology is normal.       ASSESSMENT AND PLAN  SCC L tonsil, [id number], p16 +gardenia, ECS +gardenia: Completed chemoradiation, interruptions as above and as summarized in Dr. Robbins's notes. Again reviewed anticipated course for recovery, he has expected toxicities today. Despite my request, New did not want to adjust his appt next week from [facility], to in person 7/2 when he plans to come down for Dr. Serrano. I will try and add on labs 7/2.   --Has follow up with Dr. Robbins 7/30 with CT. 3 month PET/CT already scheduled 9/3. I will see him afterward that day in addition to Dr. Fang and Dr. Robbins's appointments. Continue frequent visits for now, virtually per his preference.     Dysphagia: meeting with Reba Oshea 7/9. reiterated need to continue exercises and PO intake to prevent long term sequelae. He should     Gtube site infection: goopey drainage and tenderness resolved. No tenderness with using unless pulled in wrong direction.     Secretions: Reviewed anticipated course. Guaifenesin prn       Malnutrition, weight loss: Tube feedings going fine, getting in 5 cartons per day via doing bolus. Weight fluctuating by a couple lbs,   but chris things this is dependent on the scales here. I recommended he weigh himself daily same time at home and contact us or [facility] if still losing weight     Constipation: senna and miralax prn.     Anticipated acute on chronic opioid use: Working with Dr. Serrano on this; see his note for anticipated weaning off and timeframe. confirmed he will be down at the clinic 7/2 for his visit.      CKD: Creatinine stable; labs 7/2      ASCVD:  See note by Dr. Ch, Cardiology, from 10/20/20. If BP persistently on the lower side, would have him follow up with his PCP or Dr. Ch; if that's going to be delayed, lisinopril could be decreased temporarily. not discussed in detail today thought patient is without sx hypotension.     20 minutes spent on the date of the encounter doing chart review, review of test results, interpretation of tests, patient visit, documentation, discussion with other provider(s) and discussion with family, in addition to 14 minutes spent on the phone with the patient.     Leisa Zarate, CNP on 6/21/2021 at 4:27 PM

## 2021-06-21 ENCOUNTER — VIRTUAL VISIT (OUTPATIENT)
Dept: ONCOLOGY | Facility: CLINIC | Age: 71
End: 2021-06-21
Attending: NURSE PRACTITIONER
Payer: MEDICARE

## 2021-06-21 ENCOUNTER — PATIENT OUTREACH (OUTPATIENT)
Dept: PALLIATIVE CARE | Facility: CLINIC | Age: 71
End: 2021-06-21

## 2021-06-21 DIAGNOSIS — G89.3 NEOPLASM RELATED PAIN: ICD-10-CM

## 2021-06-21 DIAGNOSIS — F41.9 ANXIETY: ICD-10-CM

## 2021-06-21 DIAGNOSIS — G89.3 CANCER RELATED PAIN: ICD-10-CM

## 2021-06-21 DIAGNOSIS — M12.9 ARTHROPATHY: ICD-10-CM

## 2021-06-21 DIAGNOSIS — E43 SEVERE PROTEIN-CALORIE MALNUTRITION (H): ICD-10-CM

## 2021-06-21 DIAGNOSIS — C09.9 SQUAMOUS CELL CARCINOMA OF LEFT TONSIL (H): ICD-10-CM

## 2021-06-21 DIAGNOSIS — N18.2 CKD (CHRONIC KIDNEY DISEASE) STAGE 2, GFR 60-89 ML/MIN: ICD-10-CM

## 2021-06-21 DIAGNOSIS — C09.9 SQUAMOUS CELL CARCINOMA OF LEFT TONSIL (H): Primary | ICD-10-CM

## 2021-06-21 PROCEDURE — 99442 PR PHYSICIAN TELEPHONE EVALUATION 11-20 MIN: CPT | Mod: 95 | Performed by: NURSE PRACTITIONER

## 2021-06-21 PROCEDURE — 999N001193 HC VIDEO/TELEPHONE VISIT; NO CHARGE

## 2021-06-21 NOTE — PROGRESS NOTES
Received VM from patient's wife requesting refills of norco (#18 tabs left), methadone (#22 tabs left), and xanax (#66 tabs left). Called her back - advised my understanding is that palliative has not been filling xanax, that has been continued by patient's PCP as we have not made any changes (attempted to have patient decrease, but he was unsuccessful).     Last refill norco: 5/24/2021  Last refill methadone: 5/25/2021 (dose was adjusted twice since last refill)  Last office visit: 6/11/2021  Scheduled for follow up 7/2/2021     Will route request to MD for review.     Reviewed [place]  Report.

## 2021-06-21 NOTE — LETTER
"    6/21/2021         RE: Quan Murphy  [address]  [place] [facility] [address]        Dear Colleague,    Thank you for referring your patient, Quan Murphy, to the [facility]. Please see a copy of my visit note below.    New is a 70 year old who is being evaluated via a billable telephone visit.      What phone number would you like to be contacted at? [phone]  How would you like to obtain your AVS? 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 Christensen). Path: SCC, non keratinizing, p16 +gardenia  3/23/21              CT neck. 2.9 x 2.7 x 3.5 cm L tonsil fullness involving soft palate, level 2-3 neck nodes  4/2/21                PET/CT. 2.7 x 2.2 x 2.7 cm L palatine tonsil mass (SUV 24), extension to oral cavity, 3.4 x 2.0 cm L level 4 node invading SCM, 1.8 x 1.8 cm L level 2A/3 node (SUV 7.9) w/ECS, 1.4 x 0.9 cm R level 2A node (SUV 6.7)  4/28~6/12/21     Chemoradiation with weekly cisplatin. No HD cisplatin due to CKD. Cisplatin held 6/2/21 due to malnutrition, SURESH  5/21/21              [facility] ED for fever to 102. No localizing source, cultures negative, not neutropenic  6/2~6/9/21         [facility] for malnutrition, inability to tolerate PO, SURESH. C/b respiratory arrest due to opioids, " "aspiration pneumonia, anxiety     SUBJECTIVE  This past week has been \"great\". He is feeling happy to be home, being in the hospital was really hard for him. He now is relaxed at home. He is taking his pain medication as prescribed and working with palliative care. He is getting his goal amt of 5 cans jevity and 2 pro-source. Maribell recommended he add a scoop of 1340 powder to each scoop. He is sipping water and doing his swallowing exercises despite throat discomfort. He denies feelings of dehydration. His BM's remain sporadic but he is managing with miralax and senna without issue. He comments he has cut back on ambien and Xanax. G tube site is still somewhat red but goopey drainage and tenderness has resolved with antibiotic. No f/c/breathing concerns.     10 point review of systems otherwise negative    CURRENT OUTPATIENT MEDICATIONS  Current Outpatient Medications   Medication Sig Dispense Refill     ALPRAZolam (XANAX) 0.5 MG tablet Take 1 tablet (0.5 mg) by mouth 3 times daily as needed for anxiety  0     amoxicillin-clavulanate (AUGMENTIN-ES) 600-42.9 MG/5ML suspension Take 5 mLs (600 mg) by mouth 2 times daily 200 mL 0     ASPIRIN ADULT LOW STRENGTH 81 MG EC tablet TAKE ONE TABLET BY MOUTH ONCE DAILY 90 tablet 1     citalopram (CELEXA) 40 MG tablet TAKE ONE TABLET BY MOUTH ONCE DAILY 30 tablet 8     doxepin (SINEQUAN) 10 MG/ML (HIGH CONC) solution Mouth pain: take 1.5 mL mixed with equal amount tap water. Swish for 60 sec then spit. Take every 4 h as needed. Sleep: take 0.6 mL at night. May repeat this once more overnight. Do not take more than 2 xanax overnight. 118 mL 0     guaiFENesin (MUCINEX) 600 MG 12 hr tablet Take 2 tablets (1,200 mg) by mouth 2 times daily 60 tablet 1     guaiFENesin (ROBITUSSIN) 100 MG/5ML SYRP Take 10 mLs by mouth 2 times daily 473 mL 11     HYDROcodone-acetaminophen (NORCO)  MG per tablet Take 1-2 tablets by mouth every 4 hours as needed for severe pain (No more than 10 a " day. Stop dilaudid.) 150 tablet 0     hydrOXYzine (VISTARIL) 25 MG capsule TAKE 1 CAPSULE (25 MG) BY MOUTH 4 TIMES DAILY AS NEEDED FOR ANXIETY 120 capsule 3     magic mouthwash (ENTER INGREDIENTS IN COMMENTS) suspension Take 10 mLs by mouth every 4 hours as needed (pain) Swish and spit 200 mL 1     methadone (DOLOPHINE) 5 MG tablet Take 1.5 tablets (7.5 mg) by mouth 3 times daily . NO LONGER TAKE IT 4 TIMES A DAY.  0     metoprolol succinate ER (TOPROL-XL) 50 MG 24 hr tablet Take 1 tablet (50 mg) by mouth daily 90 tablet 3     naloxone (NARCAN) 4 MG/0.1ML nasal spray Spray 1 spray (4 mg) into one nostril alternating nostrils as needed for opioid reversal every 2-3 minutes until assistance arrives 0.2 mL      nitroGLYcerin (NITROSTAT) 0.4 MG sublingual tablet For chest pain place 1 tablet under the tongue every 5 minutes for 3 doses. If symptoms persist 5 minutes after 1st dose call 911. 25 tablet 0     omeprazole (PRILOSEC) 40 MG DR capsule Take 1 capsule (40 mg) by mouth daily 90 capsule 3     ondansetron (ZOFRAN-ODT) 8 MG ODT tab Take 1 tablet (8 mg) by mouth every 8 hours as needed for nausea 30 tablet 11     prochlorperazine (COMPAZINE) 10 MG tablet Take 0.5 tablets (5 mg) by mouth every 6 hours as needed (Nausea/Vomiting) 30 tablet 11     prochlorperazine (COMPAZINE) 25 MG suppository Place 1 suppository (25 mg) rectally every 12 hours as needed for nausea 15 suppository 1     protein modular (PROSOURCE TF) LIQD 1 packet by Per Feeding Tube route 2 times daily 60 packet 0     rosuvastatin (CRESTOR) 40 MG tablet Take 1 tablet (40 mg) by mouth daily 90 tablet 1     silver sulfADIAZINE (SILVADENE) 1 % external cream Apply topically daily 400 g 0     testosterone (ANDROGEL 1.62 % PUMP) 20.25 MG/ACT gel testosterone 20.25 mg/1.25 gram (1.62 %) transdermal gel pump       zolpidem (AMBIEN) 10 MG tablet TAKE ONE TABLET BY MOUTH EVERY EVENING AS NEEDED FOR SLEEP 30 tablet 5     PHYSICAL EXAM  There were no vitals taken  "for this visit.  Wt Readings from Last 10 Encounters:   06/15/21 91.6 kg (202 lb)   06/10/21 93 kg (205 lb)   06/08/21 97.9 kg (215 lb 14.4 oz)   06/03/21 95.7 kg (211 lb)   06/02/21 94.7 kg (208 lb 11.2 oz)   06/01/21 94.8 kg (209 lb)   05/28/21 97.6 kg (215 lb 1.6 oz)   05/27/21 97.3 kg (214 lb 6.4 oz)   05/26/21 96.3 kg (212 lb 3.2 oz)   05/20/21 100.7 kg (222 lb)     Objective:  There were no vitals taken for this visit.  General: alert and no distress  Psych: Alert and oriented times; coherent speech, normal rate and volume, able to articulate logical thoughts, able to abstract reason, no tangential thoughts, no hallucinations or delusions  Patient's affect is appropriate.   Pulm: Speaking in full sentences, unlabored, no audible wheezes or cough.  The rest of a comprehensive physical examination is deferred due to PHE (public health emergency) video restrictions\"    LABORATORY AND IMAGING STUDIES     Ref. Range 6/18/2021 13:06   Sodium Latest Ref Range: 133 - 144 mmol/L 135   Potassium Latest Ref Range: 3.4 - 5.3 mmol/L 4.1   Chloride Latest Ref Range: 94 - 109 mmol/L 97   Carbon Dioxide Latest Ref Range: 20 - 32 mmol/L 34 (H)   Urea Nitrogen Latest Ref Range: 7 - 30 mg/dL 22   Creatinine Latest Ref Range: 0.66 - 1.25 mg/dL 1.16   GFR Estimate Latest Ref Range: >60 mL/min/1.73_m2 64   GFR Estimate If Black Latest Ref Range: >60 mL/min/1.73_m2 74   Calcium Latest Ref Range: 8.5 - 10.1 mg/dL 8.7   Anion Gap Latest Ref Range: 3 - 14 mmol/L 4   Magnesium Latest Ref Range: 1.6 - 2.3 mg/dL 2.1   Albumin Latest Ref Range: 3.4 - 5.0 g/dL 2.3 (L)   Protein Total Latest Ref Range: 6.8 - 8.8 g/dL 6.6 (L)   Bilirubin Total Latest Ref Range: 0.2 - 1.3 mg/dL 0.3   Alkaline Phosphatase Latest Ref Range: 40 - 150 U/L 52   ALT Latest Ref Range: 0 - 70 U/L 54   AST Latest Ref Range: 0 - 45 U/L 54 (H)   Glucose Latest Ref Range: 70 - 99 mg/dL 117 (H)   WBC Latest Ref Range: 4.0 - 11.0 10e9/L 5.1   Hemoglobin Latest Ref Range: " 13.3 - 17.7 g/dL 9.7 (L)   Hematocrit Latest Ref Range: 40.0 - 53.0 % 31.2 (L)   Platelet Count Latest Ref Range: 150 - 450 10e9/L 331   RBC Count Latest Ref Range: 4.4 - 5.9 10e12/L 3.37 (L)   MCV Latest Ref Range: 78 - 100 fl 93   MCH Latest Ref Range: 26.5 - 33.0 pg 28.8   MCHC Latest Ref Range: 31.5 - 36.5 g/dL 31.1 (L)   RDW Latest Ref Range: 10.0 - 15.0 % 16.2 (H)   Diff Method Unknown Automated Method   % Neutrophils Latest Units: % 70.3   % Lymphocytes Latest Units: % 7.3   % Monocytes Latest Units: % 21.4   % Eosinophils Latest Units: % 0.2   % Basophils Latest Units: % 0.2   % Immature Granulocytes Latest Units: % 0.6   Nucleated RBCs Latest Ref Range: 0 /100 0   Absolute Neutrophil Latest Ref Range: 1.6 - 8.3 10e9/L 3.6   Absolute Lymphocytes Latest Ref Range: 0.8 - 5.3 10e9/L 0.4 (L)   Absolute Monocytes Latest Ref Range: 0.0 - 1.3 10e9/L 1.1   Absolute Eosinophils Latest Ref Range: 0.0 - 0.7 10e9/L 0.0   Absolute Basophils Latest Ref Range: 0.0 - 0.2 10e9/L 0.0   Abs Immature Granulocytes Latest Ref Range: 0 - 0.4 10e9/L 0.0   Absolute Nucleated RBC Unknown 0.0   RBC Morphology Unknown Normal   Platelet Estimate Unknown Automated count confirmed.  Platelet morphology is normal.       ASSESSMENT AND PLAN  SCC L tonsil, [id number], p16 +gardenia, ECS +gardenia: Completed chemoradiation, interruptions as above and as summarized in Dr. Robbins's notes. Again reviewed anticipated course for recovery, he has expected toxicities today. Despite my request, New did not want to adjust his appt next week from [facility], to in person 7/2 when he plans to come down for Dr. Serrano. I will try and add on labs 7/2.   --Has follow up with Dr. Robbins 7/30 with CT. 3 month PET/CT already scheduled 9/3. I will see him afterward that day in addition to Dr. Fang and Dr. Robbins's appointments. Continue frequent visits for now, virtually per his preference.     Dysphagia: meeting with Reba Oshea 7/9. reiterated need to continue  exercises and PO intake to prevent long term sequelae. He should     Gtube site infection: goopey drainage and tenderness resolved. No tenderness with using unless pulled in wrong direction.     Secretions: Reviewed anticipated course. Guaifenesin prn      Malnutrition, weight loss: Tube feedings going fine, getting in 5 cartons per day via doing bolus. Weight fluctuating by a couple lbs,   but chris things this is dependent on the scales here. I recommended he weigh himself daily same time at home and contact us or [facility] if still losing weight     Constipation: senna and miralax prn.     Anticipated acute on chronic opioid use: Working with Dr. Serrano on this; see his note for anticipated weaning off and timeframe. confirmed he will be down at the clinic 7/2 for his visit.      CKD: Creatinine stable; labs 7/2      ASCVD:  See note by Dr. Ch, Cardiology, from 10/20/20. If BP persistently on the lower side, would have him follow up with his PCP or Dr. Ch; if that's going to be delayed, lisinopril could be decreased temporarily. not discussed in detail today thought patient is without sx hypotension.     20 minutes spent on the date of the encounter doing chart review, review of test results, interpretation of tests, patient visit, documentation, discussion with other provider(s) and discussion with family, in addition to 14 minutes spent on the phone with the patient.     Leisa Zarate CNP on 6/21/2021 at 4:27 PM          Again, thank you for allowing me to participate in the care of your patient.        Sincerely,        Leisa Zarate CNP

## 2021-06-22 ENCOUNTER — PATIENT OUTREACH (OUTPATIENT)
Dept: PALLIATIVE CARE | Facility: CLINIC | Age: 71
End: 2021-06-22

## 2021-06-22 RX ORDER — METHADONE HYDROCHLORIDE 5 MG/1
7.5 TABLET ORAL 3 TIMES DAILY
Qty: 135 TABLET | Refills: 0 | Status: SHIPPED | OUTPATIENT
Start: 2021-06-22 | End: 2021-07-15

## 2021-06-22 RX ORDER — HYDROCODONE BITARTRATE AND ACETAMINOPHEN 10; 325 MG/1; MG/1
1-2 TABLET ORAL EVERY 4 HOURS PRN
Qty: 150 TABLET | Refills: 0 | Status: SHIPPED | OUTPATIENT
Start: 2021-06-22 | End: 2021-07-02

## 2021-06-22 NOTE — PROGRESS NOTES
Received message patient having trouble filling one of the scripts at the pharmacy.     Called [facility] to verify issue - norco going through insurance fine, but methadone is coming up as refill too soon.     Reviewed [facility], it was run for #120 for 40 days last month - spoke to [facility] about this (where it was filled). This was an override they had to get from insurance in order to fill for patient and a PA was supposed to have been requested, this doesn't seem to have happened.     Since last fill, script has been changed 2 times.     New PA for current methadone dosing (7.5mg TID) requested urgently via [facility].     -Will wait for response.     It is possible that even with PA we might need to get an early refill override? Will wait for insurance response before taking next steps.     All of this info was communicated to patient and patient's wife.

## 2021-06-22 NOTE — PROGRESS NOTES
Received PA approval for methadone - called pharmacy and confirmed they were able to get script to process through, they will fill for patient to  later this evening along with norco.     Called and advised patient's wife this was done as well.     PA approved for #135 tabs/30 days -- 3/24/2021-6/22/2022

## 2021-06-23 ENCOUNTER — TELEPHONE (OUTPATIENT)
Dept: ONCOLOGY | Facility: CLINIC | Age: 71
End: 2021-06-23

## 2021-06-23 DIAGNOSIS — E78.5 HYPERLIPIDEMIA LDL GOAL <130: ICD-10-CM

## 2021-06-23 RX ORDER — HYDROXYZINE PAMOATE 25 MG/1
25 CAPSULE ORAL 4 TIMES DAILY PRN
Qty: 120 CAPSULE | Refills: 4 | Status: SHIPPED | OUTPATIENT
Start: 2021-06-23 | End: 2021-09-17

## 2021-06-23 RX ORDER — ROSUVASTATIN CALCIUM 40 MG/1
40 TABLET, COATED ORAL DAILY
Qty: 90 TABLET | Refills: 0 | Status: SHIPPED | OUTPATIENT
Start: 2021-06-23 | End: 2021-08-22

## 2021-06-23 NOTE — TELEPHONE ENCOUNTER
"  Requested Prescriptions   Pending Prescriptions Disp Refills     hydrOXYzine (VISTARIL) 25 MG capsule [Pharmacy Med Name: hydrOXYzine 25mg PAMOATE CAP] 120 capsule 3     Sig: TAKE 1 CAPSULE (25 MG) BY MOUTH 4 TIMES DAILY AS NEEDED FOR ANXIETY       Antihistamines Protocol Passed - 6/21/2021 10:40 AM        Passed - Recent (12 mo) or future (30 days) visit within the authorizing provider's specialty     Patient has had an office visit with the authorizing provider or a provider within the authorizing providers department within the previous 12 mos or has a future within next 30 days. See \"Patient Info\" tab in inbasket, or \"Choose Columns\" in Meds & Orders section of the refill encounter.          Passed - Patient is age 3 or older     Apply age and/or weight-based dosing for peds patients age 3 and older.    Forward request to provider for patients under the age of 3.          Passed - Medication is active on med list         Prescription approved per [facility] Refill Protocol.  Renay Jackson RN    "

## 2021-06-23 NOTE — TELEPHONE ENCOUNTER
Prior Authorization Retail Medication Request    Medication/Dose: METHADONE CHL 5MG HEDY  ICD code (if different than what is on RX):    Previously Tried and Failed:    Rationale:      Insurance Name:  Medicare  Insurance ID:  [id number]      Pharmacy Information (if different than what is on RX)  Name:  [facility]   Phone:  [phone]

## 2021-06-24 ENCOUNTER — MYC MEDICAL ADVICE (OUTPATIENT)
Dept: INTERNAL MEDICINE | Facility: CLINIC | Age: 71
End: 2021-06-24

## 2021-06-24 NOTE — PROGRESS NOTES
This is a recent snapshot of the patient's [facility] medical record.  For current drug dose and complete information and questions, call [phone] or In Basket pool, fv home infusion ([id number])  CSN Number:  [id number]

## 2021-06-25 NOTE — PROGRESS NOTES
This is a recent snapshot of the patient's [facility] medical record.  For current drug dose and complete information and questions, call [phone] or In Basket pool, fv home infusion ([id number])  CSN Number:  [id number]

## 2021-06-28 ENCOUNTER — VIRTUAL VISIT (OUTPATIENT)
Dept: RADIATION ONCOLOGY | Facility: CLINIC | Age: 71
End: 2021-06-28
Attending: RADIOLOGY
Payer: MEDICARE

## 2021-06-28 DIAGNOSIS — C09.9 SQUAMOUS CELL CARCINOMA OF LEFT TONSIL (H): Primary | ICD-10-CM

## 2021-06-28 NOTE — LETTER
2021         RE: Quan Murphy   [address] [facility] [address]        Dear Colleague,    Thank you for referring your patient, Quan Murphy, to the [facility] RADIATION ONCOLOGY. Please see a copy of my visit note below.    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  He is just over 2 weeeks out from treatment so has only noticed slight improvement in his symptoms. Phlegm is starting to decrease and notices a dry mouth. He has stopped the guaifenesin.  His weight has stabilized around 190.  He is eating 2 small meals a day now and continuing feedings.   His pain is adequately controlled and palliative care is working with him.  He is currently taking methadone 3 times daily and Norco 1 every 4 hours.  He is not taking Ambien and has reduced his Xanax amount.  He states he has not been drinking.  He states he is working on his swallowing exercise. He continues to moisturize.  He is using fluoride.  His mood is good. Skin is almost completely healed. Energy is improving.    ROS:  Complete review of systems is negative except for symptoms discussed in subjective above.    Current Outpatient Medications   Medication     ALPRAZolam (XANAX) 0.5 MG tablet     amoxicillin-clavulanate (AUGMENTIN-ES) 600-42.9 MG/5ML suspension     ASPIRIN ADULT LOW STRENGTH 81 MG EC tablet     citalopram (CELEXA) 40 MG tablet     doxepin (SINEQUAN) 10 MG/ML (HIGH CONC) solution     guaiFENesin (MUCINEX) 600 MG 12 hr tablet     HYDROcodone-acetaminophen (NORCO)   MG per tablet     hydrOXYzine (VISTARIL) 25 MG capsule     methadone (DOLOPHINE) 5 MG tablet     metoprolol succinate ER (TOPROL-XL) 50 MG 24 hr tablet     naloxone (NARCAN) 4 MG/0.1ML nasal spray     nitroGLYcerin (NITROSTAT) 0.4 MG sublingual tablet     omeprazole (PRILOSEC) 40 MG DR capsule     ondansetron (ZOFRAN-ODT) 8 MG ODT tab     prochlorperazine (COMPAZINE) 10 MG tablet     protein modular (PROSOURCE TF) LIQD     rosuvastatin (CRESTOR) 40 MG tablet     silver sulfADIAZINE (SILVADENE) 1 % external cream     testosterone (ANDROGEL 1.62 % PUMP) 20.25 MG/ACT gel     zolpidem (AMBIEN) 10 MG tablet     No current facility-administered medications for this visit.           Allergies   Allergen Reactions     Animal Dander      Azithromycin Nausea and Vomiting     Dust Mites      Pollen Extract      Smoke.        Past Medical History:   Diagnosis Date     Allergy, unspecified not elsewhere classified     Seasonal allergies, pollen, dust, smoke and animals     Antiplatelet or antithrombotic long-term use      Anxiety      Arthritis      Chest pain      Chronic sinusitis      Coronary atherosclerosis of unspecified type of vessel, native or graft     Coronary artery disease     Depressive disorder 1995     History of blood transfusion 12/15/2004    Prostate Surgery - My own blood     Hyperlipidemia      Hypertension      Inguinal hernia      Kidney stones      Malignant neoplasm of prostate (H)     Prostate cancer     Prostate cancer (H)          PHYSICAL EXAM:  Gen: Alert, in NAD.  Seems more alert this week. Mood is good.      LABS AND IMAGING:  Reviewed.    IMPRESSION:   Mr. Murphy is a 70 year old male with a squamous cell carcinoma of the left tonsil s/p chemoradiation now 9 days out since completion of treatment and is stable and it is too soon to see any improvement in his acute side effects.    PLAN:   1. Follow-up with me late  next week.  Follow up with Dr. Whiteside in 1 month.   Continue close follow up with  ENT and medical oncology.  2. Continue to moisturize with aquaphor and when skin is completely healed can change to preferred moisturizer.  Discussed importance of sun avoidance or sun protection.  3. Fatigue should continue to improve.  4. Continue oral cares with salt and soda rinses.  Routine dental follow up at least every 6 months.  Continue to use fluoride trays or fluoride treatments. Continue jaw, neck and swallowing exercises.  5. Treatment related pain should continue to diminish.  Recommended to slowly wean off pain medications when pain decreases.  He will be on a weaning schedule per palliative care.  6.    Continue to push fluids and caloric intake to maintain weight.  Try to increase oral intake and decrease calories through PEG tube while maintaining weight.      Time on phone:  18 minutes    Maribell Martines NP   Radiation Oncology

## 2021-06-28 NOTE — PROGRESS NOTES
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  He is just over 2 weeeks out from treatment so has only noticed slight improvement in his symptoms. Phlegm is starting to decrease and notices a dry mouth. He has stopped the guaifenesin.  His weight has stabilized around 190.  He is eating 2 small meals a day now and continuing feedings.   His pain is adequately controlled and palliative care is working with him.  He is currently taking methadone 3 times daily and Norco 1 every 4 hours.  He is not taking Ambien and has reduced his Xanax amount.  He states he has not been drinking.  He states he is working on his swallowing exercise. He continues to moisturize.  He is using fluoride.  His mood is good. Skin is almost completely healed. Energy is improving.    ROS:  Complete review of systems is negative except for symptoms discussed in subjective above.    Current Outpatient Medications   Medication     ALPRAZolam (XANAX) 0.5 MG tablet     amoxicillin-clavulanate (AUGMENTIN-ES) 600-42.9 MG/5ML suspension     ASPIRIN ADULT LOW STRENGTH 81 MG EC tablet     citalopram (CELEXA) 40 MG tablet     doxepin (SINEQUAN) 10 MG/ML (HIGH CONC) solution     guaiFENesin (MUCINEX) 600 MG 12 hr tablet     HYDROcodone-acetaminophen (NORCO)  MG per tablet     hydrOXYzine (VISTARIL) 25 MG capsule     methadone (DOLOPHINE) 5 MG tablet     metoprolol succinate ER (TOPROL-XL) 50 MG 24 hr tablet     naloxone (NARCAN) 4 MG/0.1ML nasal spray     nitroGLYcerin (NITROSTAT) 0.4 MG sublingual tablet      omeprazole (PRILOSEC) 40 MG DR capsule     ondansetron (ZOFRAN-ODT) 8 MG ODT tab     prochlorperazine (COMPAZINE) 10 MG tablet     protein modular (PROSOURCE TF) LIQD     rosuvastatin (CRESTOR) 40 MG tablet     silver sulfADIAZINE (SILVADENE) 1 % external cream     testosterone (ANDROGEL 1.62 % PUMP) 20.25 MG/ACT gel     zolpidem (AMBIEN) 10 MG tablet     No current facility-administered medications for this visit.           Allergies   Allergen Reactions     Animal Dander      Azithromycin Nausea and Vomiting     Dust Mites      Pollen Extract      Smoke.        Past Medical History:   Diagnosis Date     Allergy, unspecified not elsewhere classified     Seasonal allergies, pollen, dust, smoke and animals     Antiplatelet or antithrombotic long-term use      Anxiety      Arthritis      Chest pain      Chronic sinusitis      Coronary atherosclerosis of unspecified type of vessel, native or graft     Coronary artery disease     Depressive disorder 1995     History of blood transfusion 12/15/2004    Prostate Surgery - My own blood     Hyperlipidemia      Hypertension      Inguinal hernia      Kidney stones      Malignant neoplasm of prostate (H)     Prostate cancer     Prostate cancer (H)          PHYSICAL EXAM:  Gen: Alert, in NAD.  Seems more alert this week. Mood is good.      LABS AND IMAGING:  Reviewed.    IMPRESSION:   Mr. Murphy is a 70 year old male with a squamous cell carcinoma of the left tonsil s/p chemoradiation now 9 days out since completion of treatment and is stable and it is too soon to see any improvement in his acute side effects.    PLAN:   1. Follow-up with me late  next week.  Follow up with Dr. Whiteside in 1 month.   Continue close follow up with ENT and medical oncology.  2. Continue to moisturize with aquaphor and when skin is completely healed can change to preferred moisturizer.  Discussed importance of sun avoidance or sun protection.  3. Fatigue should continue to improve.  4. Continue  oral cares with salt and soda rinses.  Routine dental follow up at least every 6 months.  Continue to use fluoride trays or fluoride treatments. Continue jaw, neck and swallowing exercises.  5. Treatment related pain should continue to diminish.  Recommended to slowly wean off pain medications when pain decreases.  He will be on a weaning schedule per palliative care.  6.    Continue to push fluids and caloric intake to maintain weight.  Try to increase oral intake and decrease calories through PEG tube while maintaining weight.      Time on phone:  18 minutes    Maribell Martines NP   Radiation Oncology

## 2021-06-29 ENCOUNTER — VIRTUAL VISIT (OUTPATIENT)
Dept: ONCOLOGY | Facility: CLINIC | Age: 71
End: 2021-06-29
Attending: INTERNAL MEDICINE
Payer: MEDICARE

## 2021-06-29 DIAGNOSIS — C09.9 SQUAMOUS CELL CARCINOMA OF LEFT TONSIL (H): Primary | ICD-10-CM

## 2021-06-29 PROCEDURE — 99443 PR PHYSICIAN TELEPHONE EVALUATION 21-30 MIN: CPT | Mod: 95 | Performed by: PHYSICIAN ASSISTANT

## 2021-06-29 PROCEDURE — 999N001193 HC VIDEO/TELEPHONE VISIT; NO CHARGE

## 2021-06-29 NOTE — PROGRESS NOTES
"New is a 70 year old who is being evaluated via a billable telephone visit.      What phone number would you like to be contacted at? [phone]  How would you like to obtain your AVS? MyChart     Vitals - Patient Reported  Systolic (Patient Reported): 92  Diastolic (Patient Reported): 64  Weight (Patient Reported): 86.6 kg (191 lb)  Height (Patient Reported): 174 cm (5' 8.5\")  BMI (Based on Pt Reported Ht/Wt): 28.62  Pain Score: Extreme Pain (8)  Pain Loc: Throat    Chiquis Ernesto ELI    Phone call duration: 23 minutes    Oncology/Hematology Visit Note  Jun 29, 2021    CANCER TYPE: SCC L tonsil, p16 +gardenia  STAGE: cT2N2 (II)  ECOG PS: 1     Cancer Staging  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)  Staging form: Pharynx - Oropharynx, AJCC 8th Edition  - Clinical stage from 4/13/2021: Stage II (cT2, cN2, cM0) - Signed by Shanthi Schrader MD on 4/13/2021        SUMMARY  3/22/21              L tonsil bx in clinic (Dr. Christensen). Path: SCC, non keratinizing, p16 +gardenia  3/23/21              CT neck. 2.9 x 2.7 x 3.5 cm L tonsil fullness involving soft palate, level 2-3 neck nodes  4/2/21                PET/CT. 2.7 x 2.2 x 2.7 cm L palatine tonsil mass (SUV 24), extension to oral cavity, 3.4 x 2.0 cm L level 4 node invading SCM, 1.8 x 1.8 cm L level 2A/3 node (SUV 7.9) w/ECS, 1.4 x 0.9 cm R level 2A node (SUV 6.7)  4/28~6/12/21     Chemoradiation with weekly cisplatin. No HD cisplatin due to CKD. Cisplatin held 6/2/21 due to malnutrition, SURESH  5/21/21              [facility] ED for fever to 102. No localizing source, cultures negative, not neutropenic  6/2~6/9/21         [facility] for malnutrition, inability to tolerate PO, SURESH. C/b respiratory arrest due to opioids, aspiration pneumonia, anxiety    He was called today for oncology follow-up after treatment.     Interval History:  New was called today for oncology follow-up. He overall is feeling well. He is starting to eat more-doing 2 meals per day in addition to 5 " cartons of tube feedings. His weight is stable. He is having normal bowel movements. His dry mouth is improved. He continues to have throat/mouth pain but is able to manage with Methadone and Norco and is planning on tapering with Dr. Serrano. Talking and swallowing make the pain worse. He has been monitoing his BP and HR, both WNL, no dizziness, chest pain, or SOB. He still isn't sleeping well at night though admits he isn't very active during the day so he isn't that tired at night. He denies any fevers, does cough occasionally and uses Robitussin PRN. His prior G tube infection is resolved and he denies any GI concerns. No hearing issues.     Current Outpatient Medications   Medication Sig Dispense Refill     ALPRAZolam (XANAX) 0.5 MG tablet Take 1 tablet (0.5 mg) by mouth 3 times daily as needed for anxiety  0     amoxicillin-clavulanate (AUGMENTIN-ES) 600-42.9 MG/5ML suspension Take 5 mLs (600 mg) by mouth 2 times daily 200 mL 0     ASPIRIN ADULT LOW STRENGTH 81 MG EC tablet TAKE ONE TABLET BY MOUTH ONCE DAILY 90 tablet 1     citalopram (CELEXA) 40 MG tablet TAKE ONE TABLET BY MOUTH ONCE DAILY 30 tablet 8     HYDROcodone-acetaminophen (NORCO)  MG per tablet Take 1-2 tablets by mouth every 4 hours as needed for severe pain (No more than 10 a day. Stop dilaudid.) 150 tablet 0     hydrOXYzine (VISTARIL) 25 MG capsule TAKE 1 CAPSULE (25 MG) BY MOUTH 4 TIMES DAILY AS NEEDED FOR ANXIETY 120 capsule 4     methadone (DOLOPHINE) 5 MG tablet Take 1.5 tablets (7.5 mg) by mouth 3 times daily . NO LONGER TAKE IT 4 TIMES A DAY. 135 tablet 0     metoprolol succinate ER (TOPROL-XL) 50 MG 24 hr tablet Take 1 tablet (50 mg) by mouth daily 90 tablet 3     naloxone (NARCAN) 4 MG/0.1ML nasal spray Spray 1 spray (4 mg) into one nostril alternating nostrils as needed for opioid reversal every 2-3 minutes until assistance arrives 0.2 mL      nitroGLYcerin (NITROSTAT) 0.4 MG sublingual tablet For chest pain place 1 tablet under  the tongue every 5 minutes for 3 doses. If symptoms persist 5 minutes after 1st dose call 911. 25 tablet 0     omeprazole (PRILOSEC) 40 MG DR capsule Take 1 capsule (40 mg) by mouth daily 90 capsule 3     protein modular (PROSOURCE TF) LIQD 1 packet by Per Feeding Tube route 2 times daily 60 packet 0     rosuvastatin (CRESTOR) 40 MG tablet Take 1 tablet (40 mg) by mouth daily 90 tablet 0     testosterone (ANDROGEL 1.62 % PUMP) 20.25 MG/ACT gel testosterone 20.25 mg/1.25 gram (1.62 %) transdermal gel pump       zolpidem (AMBIEN) 10 MG tablet TAKE ONE TABLET BY MOUTH EVERY EVENING AS NEEDED FOR SLEEP 30 tablet 5       Physical Examination:  There were no vitals taken for this visit.  Wt Readings from Last 10 Encounters:   06/15/21 91.6 kg (202 lb)   06/10/21 93 kg (205 lb)   06/08/21 97.9 kg (215 lb 14.4 oz)   06/03/21 95.7 kg (211 lb)   06/02/21 94.7 kg (208 lb 11.2 oz)   06/01/21 94.8 kg (209 lb)   05/28/21 97.6 kg (215 lb 1.6 oz)   05/27/21 97.3 kg (214 lb 6.4 oz)   05/26/21 96.3 kg (212 lb 3.2 oz)   05/20/21 100.7 kg (222 lb)     Unable to do physical exam 2/2 telephone visit. Well sounding in no distress. Normal speech and thought process. Good voice quality. No audible wheezing or cough.    Laboratory Data:  Results for MANUEL MONTOYA (MRN [id number]) as of 6/30/2021 15:57   6/18/2021 13:06   Sodium 135   Potassium 4.1   Chloride 97   Carbon Dioxide 34 (H)   Urea Nitrogen 22   Creatinine 1.16   GFR Estimate 64   GFR Estimate If Black 74   Calcium 8.7   Anion Gap 4   Magnesium 2.1   Albumin 2.3 (L)   Protein Total 6.6 (L)   Bilirubin Total 0.3   Alkaline Phosphatase 52   ALT 54   AST 54 (H)   Glucose 117 (H)   WBC 5.1   Hemoglobin 9.7 (L)   Hematocrit 31.2 (L)   Platelet Count 331   RBC Count 3.37 (L)   MCV 93   MCH 28.8   MCHC 31.1 (L)   RDW 16.2 (H)   Diff Method Automated Method   % Neutrophils 70.3   % Lymphocytes 7.3   % Monocytes 21.4   % Eosinophils 0.2   % Basophils 0.2   % Immature Granulocytes 0.6    Nucleated RBCs 0   Absolute Neutrophil 3.6   Absolute Lymphocytes 0.4 (L)   Absolute Monocytes 1.1   Absolute Eosinophils 0.0   Absolute Basophils 0.0   Abs Immature Granulocytes 0.0   Absolute Nucleated RBC 0.0   RBC Morphology Normal   Platelet Estimate Automated count confirmed.  Platelet morphology is normal.       Assessment and Plan:  1. Onc  SCC L tonsil, [id number], p16 +gardenia, ECS +gardenia: Completed chemoradiation 6/12/21. Recovering as expected after treatment. Prior labs reviewed and no concerning findings. Discussed in detail with patient the expected timing of recovery. Will have [facility] follow-up with labs end of July in addition to CT and rad onc/ENT visit. Will have PET in September.    2. ENT  Dysphagia: Improving, has speech follow-up alter this week. Continue exercises    Pain with acute on chronic opioid use, on methadone and Norco. Will work on tapering with Dr. Serrano. Visit scheduled 7/2. Continue salt/soda swishes as well    Phlegm/secretions improving, discussed dry mouth can be long term issue.    3. FEN  Continue tube feedings -can decrease as he adds in more oral intake. Has RD follow-up scheduled     Lab check this Friday. Hx CKD, will monitor creat.     4. Cards  No active concerns, continue Metoprolol and Crestor. Will need PCP or cardsfollow-up for ASCVD in future.      10 minutes spent on the date of the encounter doing chart review and documentation, in addition to 23 minutes spent on the phone with the patient.     Tariq Waller PA-C  [facility]  [address] [place] [address] [phone]

## 2021-06-29 NOTE — LETTER
"    6/29/2021         RE: Quan Murphy  [address]  [facility] [address]      New is a 70 year old who is being evaluated via a billable telephone visit.      What phone number would you like to be contacted at? [phone]  How would you like to obtain your AVS? 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 Christensen). Path: SCC, non keratinizing, p16 +gardenia  3/23/21              CT neck. 2.9 x 2.7 x 3.5 cm L tonsil fullness involving soft palate, level 2-3 neck nodes  4/2/21                PET/CT. 2.7 x 2.2 x 2.7 cm L palatine tonsil mass (SUV 24), extension to oral cavity, 3.4 x 2.0 cm L level 4 node invading SCM, 1.8 x 1.8 cm L level 2A/3 node (SUV 7.9) w/ECS, 1.4 x 0.9 cm R level 2A node (SUV 6.7)  4/28~6/12/21     Chemoradiation with weekly cisplatin. No HD cisplatin due to CKD. Cisplatin held 6/2/21 due to malnutrition, SURESH  5/21/21              [facility] ED for fever to 102. No localizing source, cultures negative, not neutropenic  6/2~6/9/21         [facility] for malnutrition, inability to tolerate PO, SURESH. C/b respiratory arrest due to opioids, aspiration pneumonia, anxiety    He was called today for oncology follow-up after treatment.     Interval History:  New was called today for oncology follow-up. He overall is " feeling well. He is starting to eat more-doing 2 meals per day in addition to 5 cartons of tube feedings. His weight is stable. He is having normal bowel movements. His dry mouth is improved. He continues to have throat/mouth pain but is able to manage with Methadone and Norco and is planning on tapering with Dr. Serrano. Talking and swallowing make the pain worse. He has been monitoing his BP and HR, both WNL, no dizziness, chest pain, or SOB. He still isn't sleeping well at night though admits he isn't very active during the day so he isn't that tired at night. He denies any fevers, does cough occasionally and uses Robitussin PRN. His prior G tube infection is resolved and he denies any GI concerns. No hearing issues.     Current Outpatient Medications   Medication Sig Dispense Refill     ALPRAZolam (XANAX) 0.5 MG tablet Take 1 tablet (0.5 mg) by mouth 3 times daily as needed for anxiety  0     amoxicillin-clavulanate (AUGMENTIN-ES) 600-42.9 MG/5ML suspension Take 5 mLs (600 mg) by mouth 2 times daily 200 mL 0     ASPIRIN ADULT LOW STRENGTH 81 MG EC tablet TAKE ONE TABLET BY MOUTH ONCE DAILY 90 tablet 1     citalopram (CELEXA) 40 MG tablet TAKE ONE TABLET BY MOUTH ONCE DAILY 30 tablet 8     HYDROcodone-acetaminophen (NORCO)  MG per tablet Take 1-2 tablets by mouth every 4 hours as needed for severe pain (No more than 10 a day. Stop dilaudid.) 150 tablet 0     hydrOXYzine (VISTARIL) 25 MG capsule TAKE 1 CAPSULE (25 MG) BY MOUTH 4 TIMES DAILY AS NEEDED FOR ANXIETY 120 capsule 4     methadone (DOLOPHINE) 5 MG tablet Take 1.5 tablets (7.5 mg) by mouth 3 times daily . NO LONGER TAKE IT 4 TIMES A DAY. 135 tablet 0     metoprolol succinate ER (TOPROL-XL) 50 MG 24 hr tablet Take 1 tablet (50 mg) by mouth daily 90 tablet 3     naloxone (NARCAN) 4 MG/0.1ML nasal spray Spray 1 spray (4 mg) into one nostril alternating nostrils as needed for opioid reversal every 2-3 minutes until assistance arrives 0.2 mL       nitroGLYcerin (NITROSTAT) 0.4 MG sublingual tablet For chest pain place 1 tablet under the tongue every 5 minutes for 3 doses. If symptoms persist 5 minutes after 1st dose call 911. 25 tablet 0     omeprazole (PRILOSEC) 40 MG DR capsule Take 1 capsule (40 mg) by mouth daily 90 capsule 3     protein modular (PROSOURCE TF) LIQD 1 packet by Per Feeding Tube route 2 times daily 60 packet 0     rosuvastatin (CRESTOR) 40 MG tablet Take 1 tablet (40 mg) by mouth daily 90 tablet 0     testosterone (ANDROGEL 1.62 % PUMP) 20.25 MG/ACT gel testosterone 20.25 mg/1.25 gram (1.62 %) transdermal gel pump       zolpidem (AMBIEN) 10 MG tablet TAKE ONE TABLET BY MOUTH EVERY EVENING AS NEEDED FOR SLEEP 30 tablet 5       Physical Examination:  There were no vitals taken for this visit.  Wt Readings from Last 10 Encounters:   06/15/21 91.6 kg (202 lb)   06/10/21 93 kg (205 lb)   06/08/21 97.9 kg (215 lb 14.4 oz)   06/03/21 95.7 kg (211 lb)   06/02/21 94.7 kg (208 lb 11.2 oz)   06/01/21 94.8 kg (209 lb)   05/28/21 97.6 kg (215 lb 1.6 oz)   05/27/21 97.3 kg (214 lb 6.4 oz)   05/26/21 96.3 kg (212 lb 3.2 oz)   05/20/21 100.7 kg (222 lb)     Unable to do physical exam 2/2 telephone visit. Well sounding in no distress. Normal speech and thought process. Good voice quality. No audible wheezing or cough.    Laboratory Data:  Results for MANUEL MONTOYA (MRN [id number]) as of 6/30/2021 15:57   6/18/2021 13:06   Sodium 135   Potassium 4.1   Chloride 97   Carbon Dioxide 34 (H)   Urea Nitrogen 22   Creatinine 1.16   GFR Estimate 64   GFR Estimate If Black 74   Calcium 8.7   Anion Gap 4   Magnesium 2.1   Albumin 2.3 (L)   Protein Total 6.6 (L)   Bilirubin Total 0.3   Alkaline Phosphatase 52   ALT 54   AST 54 (H)   Glucose 117 (H)   WBC 5.1   Hemoglobin 9.7 (L)   Hematocrit 31.2 (L)   Platelet Count 331   RBC Count 3.37 (L)   MCV 93   MCH 28.8   MCHC 31.1 (L)   RDW 16.2 (H)   Diff Method Automated Method   % Neutrophils 70.3   % Lymphocytes 7.3   %  Monocytes 21.4   % Eosinophils 0.2   % Basophils 0.2   % Immature Granulocytes 0.6   Nucleated RBCs 0   Absolute Neutrophil 3.6   Absolute Lymphocytes 0.4 (L)   Absolute Monocytes 1.1   Absolute Eosinophils 0.0   Absolute Basophils 0.0   Abs Immature Granulocytes 0.0   Absolute Nucleated RBC 0.0   RBC Morphology Normal   Platelet Estimate Automated count confirmed.  Platelet morphology is normal.       Assessment and Plan:  1. Onc  SCC L tonsil, sO1L0F1, p16 +gardenia, ECS +gardenia: Completed chemoradiation 6/12/21. Recovering as expected after treatment. Prior labs reviewed and no concerning findings. Discussed in detail with patient the expected timing of recovery. Will have [facility] follow-up with labs end of July in addition to CT and rad onc/ENT visit. Will have PET in September.    2. ENT  Dysphagia: Improving, has speech follow-up alter this week. Continue exercises    Pain with acute on chronic opioid use, on methadone and Norco. Will work on tapering with Dr. Serrano. Visit scheduled 7/2. Continue salt/soda swishes as well    Phlegm/secretions improving, discussed dry mouth can be long term issue.    3. FEN  Continue tube feedings -can decrease as he adds in more oral intake. Has RD follow-up scheduled     Lab check this Friday. Hx CKD, will monitor creat.     4. Cards  No active concerns, continue Metoprolol and Crestor. Will need PCP or cardsfollow-up for ASCVD in future.      10 minutes spent on the date of the encounter doing chart review and documentation, in addition to 23 minutes spent on the phone with the patient.     Tariq Waller PA-C  [facility]  [address], [place] [address] [phone]              CARLOS Hernandez

## 2021-06-29 NOTE — Clinical Note
"    6/29/2021         RE: Quan Murphy  [address]  [facility] [address]        Dear Colleague,    Thank you for referring your patient, Quan Murphy, to the [facility]. Please see a copy of my visit note below.    New is a 70 year old who is being evaluated via a billable telephone visit.      What phone number would you like to be contacted at? [phone]  How would you like to obtain your AVS? MyChart     Vitals - Patient Reported  Systolic (Patient Reported): 92  Diastolic (Patient Reported): 64  Weight (Patient Reported): 86.6 kg (191 lb)  Height (Patient Reported): 174 cm (5' 8.5\")  BMI (Based on Pt Reported Ht/Wt): 28.62  Pain Score: Extreme Pain (8)  Pain Loc: Throat    Chiquis Ernesto RMA    Phone call duration: 23 minutes    *Feeling OK  *Starting to eat-2 meals per day, doing 5 cartons of tube feedings per day. Weight 191lb  *Regular BM  *Dry mouth improving   *Pain is very slowly improving-methadone and norco, working with rosielle to taper  *Burning with talking and swallowing   */74 today HR 76, no dizziness, chest pain or SOB  *Still not sleeping well at night, admits he isn't as active during the day  *No fevers, cough occasionally, has Guaifenesin PRN  *G tube infection resolved. No GI concerns.   *No hearing issues       Again, thank you for allowing me to participate in the care of your patient.        Sincerely,        CARLOS Hernandez  "

## 2021-06-30 ENCOUNTER — VIRTUAL VISIT (OUTPATIENT)
Dept: ONCOLOGY | Facility: CLINIC | Age: 71
End: 2021-06-30
Attending: NURSE PRACTITIONER
Payer: MEDICARE

## 2021-06-30 DIAGNOSIS — C09.9 SQUAMOUS CELL CARCINOMA OF LEFT TONSIL (H): ICD-10-CM

## 2021-06-30 DIAGNOSIS — E44.0 MODERATE PROTEIN-CALORIE MALNUTRITION (H): ICD-10-CM

## 2021-06-30 DIAGNOSIS — N18.2 CKD (CHRONIC KIDNEY DISEASE) STAGE 2, GFR 60-89 ML/MIN: Primary | ICD-10-CM

## 2021-06-30 PROCEDURE — 97802 MEDICAL NUTRITION INDIV IN: CPT | Mod: 95,TEL | Performed by: DIETITIAN, REGISTERED

## 2021-06-30 NOTE — PROGRESS NOTES
"The patient has been notified of the following:      \"We have found that certain health care needs can be provided without the need for a face to face visit.  This service lets us provide the care you need with a phone conversation.       I will have full access to your [place] medical record during this entire phone call.   I will be taking notes for your medical record.      Since this is like an office visit, we will bill your insurance company for this service.       There are potential benefits and risks of telephone visits (e.g. limits to patient confidentiality) that differ from in-person visits.?  Confidentiality still applies for telephone services, and nobody will record the visit.  It is important to be in a quiet, private space that is free of distractions (including cell phone or other devices) during the visit.??      If during the course of the call I believe a telephone visit is not appropriate, you will not be charged for this service\"     Consent has been obtained for this service by care team member: Yes     CLINICAL NUTRITION SERVICES - REASSESSMENT NOTE   EVALUATION OF PREVIOUS PLAN OF CARE:   Referring Physician: Itzel  Time spent with patient: 15 minutes.     Current diet: soft foods, pureed foods  Current appetite/intake: improving  PEG Tube: 6/9/21 - dependent on 5 cartons of Jevity 1.5/day with 2 servings of Prosource protein modular  Monitoring from previous assessment:   -Food/beverage - taking ~2 small meals/day consisting of 1-2 eggs applesauce and squash with mashed potatoes, butter and gravy.  He has not been taking nutrition shakes but is receptive to this if it helps him wean off tube feeding.  He tells me that he doesn't have an appetite for food with his feedings.   His primary barriers to eating are low appetite and desire to eat due to lack of taste and burning in his mouth.   -EN intake -  Good tolerance to gravity bag feedings; would like to start weaning  Enteral Nutrition "   Formula: Jevity 1.5 debby  Volume: 5 cartons/day (1250mL, 950 ml free water)  Provisions:  1875kcal (24kcal/kg), 80 g protein (1.0g/kg), 268g CHO, 26g fiber  Dosing wt: 78kg    -Weight trends - reported wt today on home scale 190 lb - he tells me it fluctuates day to day 189-192 lb andhas been stable at this weight over the past two weeks  Wt Readings from Last 6 Encounters:   06/15/21 91.6 kg (202 lb)   06/10/21 93 kg (205 lb)   06/08/21 97.9 kg (215 lb 14.4 oz)   06/03/21 95.7 kg (211 lb)   06/02/21 94.7 kg (208 lb 11.2 oz)   06/01/21 94.8 kg (209 lb)     Previous Goals:   1. Aim for >75% of estimated nutrition needs (1800kcal, 75g protein)  2. Aim for at least 10 cups water/electrolyte fluids/day  3. Weight stability; <1kg wt loss/week  Evaluation: Not met   Previous Nutrition Diagnosis:   Inadequate oral intake related to odynophagia, mucositis and dysgeusia as evidenced by 6% wt loss x past four weeks    Evaluation: Improving   NEW FINDINGS:   -PEG placed 6/9  -Completion of treatment 2 weeks ago  -Ready to start weaning EN   CURRENT NUTRITION DIAGNOSIS   Inadequate oral intake related to dysgeusia, mucositis as evidenced by pt dependent upon EN to meet 75% of nutrition needs.    INTERVENTIONS   Recommendations / Nutrition Prescription   1. Start weaning EN - reduce by 1 carton each week (continue to take prosource modular)   --4 cartons/day x 1 week; if weight maintained with improved PO, decrease further   --3 cartons/day x 1 week; if weight maintained with improved PO, decrease further   --2 cartons/day x 1 week; if weight maintained with improved PO, decrease further   EN wean goal by July 30th   2. Increase PO intake by ~400 debby, 20g protein with each carton of formula weaned   Implementation  EN - discussed weaning process with improved PO intake   PO - discussed ways to maximize calories/protein in soft/pureed foods  Nutrition provisions - reviewed calorie, protein needs of 2200 debby, 100g protein/day.    ONS - reviewed shakes to try that may replace cartons of EN (Boost Plus, Ensure Plus, Ensure Enlive)  Goals   1. EN and PO to provided 100% of est nutrition needs with weaning of EN  2. Weight stability through weaning process    Follow up/Monitoring:   -Food intake  -Enteral Nutrition intake  -Weight trends    Crystal Ramos RD, [facility] [place]  [phone]

## 2021-06-30 NOTE — LETTER
"    6/30/2021         RE: Quan Murphy  [address] [facility] [address]        Dear Colleague,    Thank you for referring your patient, Quan Murphy, to the [facility]. Please see a copy of my visit note below.    The patient has been notified of the following:      \"We have found that certain health care needs can be provided without the need for a face to face visit.  This service lets us provide the care you need with a phone conversation.       I will have full access to your [place] medical record during this entire phone call.   I will be taking notes for your medical record.      Since this is like an office visit, we will bill your insurance company for this service.       There are potential benefits and risks of telephone visits (e.g. limits to patient confidentiality) that differ from in-person visits.?  Confidentiality still applies for telephone services, and nobody will record the visit.  It is important to be in a quiet, private space that is free of distractions (including cell phone or other devices) during the visit.??      If during the course of the call I believe a telephone visit is not appropriate, you will not be charged for this service\"     Consent has been obtained for this service by care team member: Yes     CLINICAL NUTRITION SERVICES - REASSESSMENT NOTE   EVALUATION OF PREVIOUS PLAN OF CARE:   Referring Physician: Itzel  Time spent with patient: 15 minutes.     Current diet: soft foods, pureed foods  Current appetite/intake: improving  PEG Tube: 6/9/21 - dependent on 5 cartons of Jevity 1.5/day with 2 servings of Prosource protein modular  Monitoring from previous assessment:   -Food/beverage - taking ~2 small meals/day consisting of 1-2 eggs applesauce and squash with mashed potatoes, butter and gravy.  He has not been taking nutrition shakes but is receptive to this if it helps him wean off tube feeding.  He tells me that he doesn't have an " appetite for food with his feedings.   His primary barriers to eating are low appetite and desire to eat due to lack of taste and burning in his mouth.   -EN intake -  Good tolerance to gravity bag feedings; would like to start weaning  Enteral Nutrition   Formula: Jevity 1.5 debby  Volume: 5 cartons/day (1250mL, 950 ml free water)  Provisions:  1875kcal (24kcal/kg), 80 g protein (1.0g/kg), 268g CHO, 26g fiber  Dosing wt: 78kg    -Weight trends - reported wt today on home scale 190 lb - he tells me it fluctuates day to day 189-192 lb andhas been stable at this weight over the past two weeks  Wt Readings from Last 6 Encounters:   06/15/21 91.6 kg (202 lb)   06/10/21 93 kg (205 lb)   06/08/21 97.9 kg (215 lb 14.4 oz)   06/03/21 95.7 kg (211 lb)   06/02/21 94.7 kg (208 lb 11.2 oz)   06/01/21 94.8 kg (209 lb)     Previous Goals:   1. Aim for >75% of estimated nutrition needs (1800kcal, 75g protein)  2. Aim for at least 10 cups water/electrolyte fluids/day  3. Weight stability; <1kg wt loss/week  Evaluation: Not met   Previous Nutrition Diagnosis:   Inadequate oral intake related to odynophagia, mucositis and dysgeusia as evidenced by 6% wt loss x past four weeks    Evaluation: Improving   NEW FINDINGS:   -PEG placed 6/9  -Completion of treatment 2 weeks ago  -Ready to start weaning EN   CURRENT NUTRITION DIAGNOSIS   Inadequate oral intake related to dysgeusia, mucositis as evidenced by pt dependent upon EN to meet 75% of nutrition needs.    INTERVENTIONS   Recommendations / Nutrition Prescription   1. Start weaning EN - reduce by 1 carton each week (continue to take prosource modular)   --4 cartons/day x 1 week; if weight maintained with improved PO, decrease further   --3 cartons/day x 1 week; if weight maintained with improved PO, decrease further   --2 cartons/day x 1 week; if weight maintained with improved PO, decrease further   EN wean goal by July 30th   2. Increase PO intake by ~400 debby, 20g protein with each  carton of formula weaned   Implementation  EN - discussed weaning process with improved PO intake   PO - discussed ways to maximize calories/protein in soft/pureed foods  Nutrition provisions - reviewed calorie, protein needs of 2200 debby, 100g protein/day.   ONS - reviewed shakes to try that may replace cartons of EN (Boost Plus, Ensure Plus, Ensure Enlive)  Goals   1. EN and PO to provided 100% of est nutrition needs with weaning of EN  2. Weight stability through weaning process    Follow up/Monitoring:   -Food intake  -Enteral Nutrition intake  -Weight trends    Crystal Ramos RD, [facility]  [phone]                Again, thank you for allowing me to participate in the care of your patient.        Sincerely,        Crystal Ramos RD

## 2021-07-02 ENCOUNTER — OFFICE VISIT (OUTPATIENT)
Dept: PALLIATIVE CARE | Facility: CLINIC | Age: 71
End: 2021-07-02
Attending: INTERNAL MEDICINE
Payer: MEDICARE

## 2021-07-02 ENCOUNTER — APPOINTMENT (OUTPATIENT)
Dept: LAB | Facility: CLINIC | Age: 71
End: 2021-07-02
Attending: INTERNAL MEDICINE
Payer: MEDICARE

## 2021-07-02 ENCOUNTER — THERAPY VISIT (OUTPATIENT)
Dept: SPEECH THERAPY | Facility: CLINIC | Age: 71
End: 2021-07-02
Payer: MEDICARE

## 2021-07-02 VITALS
BODY MASS INDEX: 29.94 KG/M2 | OXYGEN SATURATION: 95 % | HEART RATE: 70 BPM | WEIGHT: 199.8 LBS | TEMPERATURE: 98.6 F | DIASTOLIC BLOOD PRESSURE: 72 MMHG | RESPIRATION RATE: 16 BRPM | SYSTOLIC BLOOD PRESSURE: 132 MMHG

## 2021-07-02 DIAGNOSIS — G89.3 NEOPLASM RELATED PAIN: ICD-10-CM

## 2021-07-02 DIAGNOSIS — C09.9 SQUAMOUS CELL CARCINOMA OF LEFT TONSIL (H): Primary | ICD-10-CM

## 2021-07-02 DIAGNOSIS — G47.01 INSOMNIA DUE TO MEDICAL CONDITION: ICD-10-CM

## 2021-07-02 DIAGNOSIS — R13.12 OROPHARYNGEAL DYSPHAGIA: Primary | ICD-10-CM

## 2021-07-02 DIAGNOSIS — C09.9 SQUAMOUS CELL CARCINOMA OF TONSIL (H): ICD-10-CM

## 2021-07-02 LAB
ALBUMIN SERPL-MCNC: 2.3 G/DL (ref 3.4–5)
ALP SERPL-CCNC: 60 U/L (ref 40–150)
ALT SERPL W P-5'-P-CCNC: 37 U/L (ref 0–70)
ANION GAP SERPL CALCULATED.3IONS-SCNC: 6 MMOL/L (ref 3–14)
AST SERPL W P-5'-P-CCNC: 34 U/L (ref 0–45)
BASOPHILS # BLD AUTO: 0 10E9/L (ref 0–0.2)
BASOPHILS NFR BLD AUTO: 0.3 %
BILIRUB SERPL-MCNC: 0.3 MG/DL (ref 0.2–1.3)
BUN SERPL-MCNC: 23 MG/DL (ref 7–30)
CALCIUM SERPL-MCNC: 8.5 MG/DL (ref 8.5–10.1)
CHLORIDE SERPL-SCNC: 100 MMOL/L (ref 94–109)
CO2 SERPL-SCNC: 29 MMOL/L (ref 20–32)
CREAT SERPL-MCNC: 1.14 MG/DL (ref 0.66–1.25)
DIFFERENTIAL METHOD BLD: ABNORMAL
EOSINOPHIL # BLD AUTO: 0 10E9/L (ref 0–0.7)
EOSINOPHIL NFR BLD AUTO: 0.3 %
ERYTHROCYTE [DISTWIDTH] IN BLOOD BY AUTOMATED COUNT: 16.3 % (ref 10–15)
GFR SERPL CREATININE-BSD FRML MDRD: 65 ML/MIN/{1.73_M2}
GLUCOSE SERPL-MCNC: 110 MG/DL (ref 70–99)
HCT VFR BLD AUTO: 29.4 % (ref 40–53)
HGB BLD-MCNC: 9.3 G/DL (ref 13.3–17.7)
IMM GRANULOCYTES # BLD: 0 10E9/L (ref 0–0.4)
IMM GRANULOCYTES NFR BLD: 0.3 %
LYMPHOCYTES # BLD AUTO: 0.4 10E9/L (ref 0.8–5.3)
LYMPHOCYTES NFR BLD AUTO: 5.6 %
MAGNESIUM SERPL-MCNC: 2 MG/DL (ref 1.6–2.3)
MCH RBC QN AUTO: 28.6 PG (ref 26.5–33)
MCHC RBC AUTO-ENTMCNC: 31.6 G/DL (ref 31.5–36.5)
MCV RBC AUTO: 91 FL (ref 78–100)
MONOCYTES # BLD AUTO: 0.8 10E9/L (ref 0–1.3)
MONOCYTES NFR BLD AUTO: 11.4 %
NEUTROPHILS # BLD AUTO: 5.5 10E9/L (ref 1.6–8.3)
NEUTROPHILS NFR BLD AUTO: 82.1 %
NRBC # BLD AUTO: 0 10*3/UL
NRBC BLD AUTO-RTO: 0 /100
PLATELET # BLD AUTO: 219 10E9/L (ref 150–450)
POTASSIUM SERPL-SCNC: 4.1 MMOL/L (ref 3.4–5.3)
PROT SERPL-MCNC: 6.2 G/DL (ref 6.8–8.8)
RBC # BLD AUTO: 3.25 10E12/L (ref 4.4–5.9)
SODIUM SERPL-SCNC: 135 MMOL/L (ref 133–144)
WBC # BLD AUTO: 6.6 10E9/L (ref 4–11)

## 2021-07-02 PROCEDURE — 83735 ASSAY OF MAGNESIUM: CPT | Performed by: INTERNAL MEDICINE

## 2021-07-02 PROCEDURE — G0463 HOSPITAL OUTPT CLINIC VISIT: HCPCS

## 2021-07-02 PROCEDURE — 85025 COMPLETE CBC W/AUTO DIFF WBC: CPT | Performed by: INTERNAL MEDICINE

## 2021-07-02 PROCEDURE — 36415 COLL VENOUS BLD VENIPUNCTURE: CPT

## 2021-07-02 PROCEDURE — 99215 OFFICE O/P EST HI 40 MIN: CPT | Performed by: INTERNAL MEDICINE

## 2021-07-02 PROCEDURE — 80053 COMPREHEN METABOLIC PANEL: CPT | Performed by: INTERNAL MEDICINE

## 2021-07-02 PROCEDURE — 92526 ORAL FUNCTION THERAPY: CPT | Mod: GN | Performed by: SPEECH-LANGUAGE PATHOLOGIST

## 2021-07-02 RX ORDER — HYDROCODONE BITARTRATE AND ACETAMINOPHEN 10; 325 MG/1; MG/1
1-2 TABLET ORAL EVERY 4 HOURS PRN
Qty: 150 TABLET | Refills: 0 | Status: SHIPPED | OUTPATIENT
Start: 2021-07-02 | End: 2021-07-15

## 2021-07-02 ASSESSMENT — PAIN SCALES - GENERAL: PAINLEVEL: MODERATE PAIN (5)

## 2021-07-02 NOTE — NURSING NOTE
"Oncology Rooming Note    July 2, 2021 10:04 AM   Quan Murphy is a 70 year old male who presents for:    Chief Complaint   Patient presents with     Blood Draw     Labs drawn from  by RN in lab. Vitals taken. Checked into next appointment.      Initial Vitals: /72 (BP Location: Right arm, Patient Position: Sitting, Cuff Size: Adult Regular)   Pulse 70   Temp 98.6  F (37  C) (Tympanic)   Resp 16   Wt 90.6 kg (199 lb 12.8 oz)   SpO2 95%   BMI 29.94 kg/m   Estimated body mass index is 29.94 kg/m  as calculated from the following:    Height as of 5/26/21: 1.74 m (5' 8.5\").    Weight as of this encounter: 90.6 kg (199 lb 12.8 oz). Body surface area is 2.09 meters squared.  Moderate Pain (5) Comment: Data Unavailable   No LMP for male patient.  Allergies reviewed: Yes  Medications reviewed: Yes    Medications: MEDICATION REFILLS NEEDED TODAY. Provider was NOT notified.  Pharmacy name entered into [employer]:    [facility] [place], [place] - [address] [place] [address] [address] GARSIA [address] - [place], [place] - [facility] [address]  [facility] [place], [place] - [address] [place] [address] [phone]  [facility] - [place], [place] - [address] [facility]    Clinical concerns: New concerns: Need refills: hydrocodone, methadone. Shortness of breath worsened since tube placement-needs more antibiotics wound still has green drainage.        Angela Stevenson LPN July 2, 2021 10:05 AM                "

## 2021-07-02 NOTE — LETTER
7/2/2021       RE: Quan Murphy  [address]  [facility] [address]     Dear Colleague,    [facility] you [facility]an Murphy[facility]. Please see a copy of my visit note below.    Palliative Care Outpatient Clinic    (This note was transcribed using voice recognition software. While I review and edit the transcription, I may miss errors, and the software sometimes does unexpected capitalizations and formatting that I miss. Please let me know of any serious mistranscriptions and I will addend this note.)    Patient ID:  Medical - He has SCC L tonsil dx 3/2021 [id number] p16+  Definitive chemoradiotherapy 4-6/2021 6/2/2021 admitted with FTT/urgent need for GT placement and had a respiratory arrest 2 d later inpatient thought to be drug-induced. Got PEG and was discharged.  6/12/2021 radiation end date     He has CKD and chronic pain (headaches/sinus pain) on long-term hydrocodone therapy.  On ambien and alprazolam for insomnia/anxiety     Social - Lives with wife. 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      Opioid Safety -  +naloxone  See 5/14/2021 opioid risk/safety discussion; I consider him higher risk due to long history of higher risk polysubstance use (opioids, benzos, alcohol), self-titration of meds, anxiety. Expectations for opioid tapering after cancer treatment discussed. Knows plan is to taper him down to his baseline opioid use and will 'hand back' his chronic pain management to his PCP at that point.     History:  History gathered today from: patient, family/loved ones, medical chart    Feels like he is making some improvemetns.  Pain remains severe, but taking more and more PO little by little.    Taking 20mg hydrocodone multiple times a day as he has been, continues on methadone, no side effects    Insomnia: he has  stopped xanax and ambien and alcohol completely!  He notes sleep is poor but actually not terrible, it's not been a disaster    Abx finishing up for GT and they are concerned if they should continue; minimal redness and some scant yellow green crusting/discharge    PE: /72 (BP Location: Right arm, Patient Position: Sitting, Cuff Size: Adult Regular)   Pulse 70   Temp 98.6  F (37  C) (Tympanic)   Resp 16   Wt 90.6 kg (199 lb 12.8 oz)   SpO2 95%   BMI 29.94 kg/m     Wt Readings from Last 3 Encounters:   07/02/21 90.6 kg (199 lb 12.8 oz)   06/15/21 91.6 kg (202 lb)   06/10/21 93 kg (205 lb)     Alert NAD  Neck sl red no ulcer  Tonuge with L ulcer  Some mild mucositis  Clear sensorium    GT with minimal redness at insertion lip. Scant yellow-green crusting      Data reviewed:  I reviewed recent labs and imaging, my comments:  Cr 1.16  Alb 2.3  Hgb 9.3     database reviewed: 6/22 #135 methadone, #150 HC 10mg      Impression & Recommendations:    69 yo man with L tonsillar cancer s/p definitive chemorads complicated by severe pain     Pain:  Time to begin opioid tapering  Will decrease methadone to 5mg tid  Will not force hydrocodone changes today--will see him in about 2 weeks and decrease then.  I anticipate he will need concrete tapering instructions and won't necessarily be able to do it without them  D/w him expectations we'll get off methadone entirely and down to his baseline hydrocodone use in the next 2 months    Insomnia:  It's encouraging stopping all those meds has not been a disaster for him  Big picture told him I think we prioritize getting off opioids and xanax and alcohol as he is doing, if he needs Ambien to sleep that's ok and not a priority at the moment comparatively    Nasal discahrge--he's using an oxymetolazine agent, d/w him to use saline spray instead     Follow-up 2 week      45 minutes spent on the date of the encounter doing chart review, history and exam, patient education &  counseling, documentation and other activities as noted above.    Thank you for involving us in the patient's care.   Elier Serrano MD / Palliative Medicine / Text me via [facility].          Again, thank you for allowing me to participate in the care of your patient.      Sincerely,    Elier Serrano MD

## 2021-07-02 NOTE — PROGRESS NOTES
Palliative [facility] Outpatient [facility]    (This note was transcribed using voice recognition software. While I review and edit the transcription, I may miss errors, and the software sometimes does unexpected capitalizations and formatting that I miss. Please let me know of any serious mistranscriptions and I will addend this note.)    Patient ID:  Medical - He has SCC L tonsil dx 3/2021 [id number] p16+  Definitive chemoradiotherapy 4-6/2021 6/2/2021 admitted with FTT/urgent need for GT placement and had a respiratory arrest 2 d later inpatient thought to be drug-induced. Got PEG and was discharged.  6/12/2021 radiation end date     He has CKD and chronic pain (headaches/sinus pain) on long-term hydrocodone therapy.  On ambien and alprazolam for insomnia/anxiety     Social - Lives with wife. 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      Opioid Safety -  +naloxone  See 5/14/2021 opioid risk/safety discussion; I consider him higher risk due to long history of higher risk polysubstance use (opioids, benzos, alcohol), self-titration of meds, anxiety. Expectations for opioid tapering after cancer treatment discussed. Knows plan is to taper him down to his baseline opioid use and will 'hand back' his chronic pain management to his PCP at that point.     History:  History gathered today from: patient, family/loved ones, medical chart    Feels like he is making some improvemetns.  Pain remains severe, but taking more and more PO little by little.    Taking 20mg hydrocodone multiple times a day as he has been, continues on methadone, no side effects    Insomnia: he has stopped xanax and ambien and alcohol completely!  He notes sleep is poor but actually not terrible, it's not been a disaster    Abx finishing up for GT and they are concerned if they should continue; minimal redness and some scant yellow green crusting/discharge    PE: /72 (BP Location: Right arm,  Patient Position: Sitting, Cuff Size: Adult Regular)   Pulse 70   Temp 98.6  F (37  C) (Tympanic)   Resp 16   Wt 90.6 kg (199 lb 12.8 oz)   SpO2 95%   BMI 29.94 kg/m     Wt Readings from Last 3 Encounters:   07/02/21 90.6 kg (199 lb 12.8 oz)   06/15/21 91.6 kg (202 lb)   06/10/21 93 kg (205 lb)     Alert NAD  Neck sl red no ulcer  Tonuge with L ulcer  Some mild mucositis  Clear sensorium    GT with minimal redness at insertion lip. Scant yellow-green crusting      Data reviewed:  I reviewed recent labs and imaging, my comments:  Cr 1.16  Alb 2.3  Hgb 9.3     database reviewed: 6/22 #135 methadone, #150 HC 10mg      Impression & Recommendations:    71 yo man with L tonsillar cancer s/p definitive chemorads complicated by severe pain     Pain:  Time to begin opioid tapering  Will decrease methadone to 5mg tid  Will not force hydrocodone changes today--will see him in about 2 weeks and decrease then.  I anticipate he will need concrete tapering instructions and won't necessarily be able to do it without them  D/w him expectations we'll get off methadone entirely and down to his baseline hydrocodone use in the next 2 months    Insomnia:  It's encouraging stopping all those meds has not been a disaster for him  Big picture told him I think we prioritize getting off opioids and xanax and alcohol as he is doing, if he needs Ambien to sleep that's ok and not a priority at the moment comparatively    Nasal discahrge--he's using an oxymetolazine agent, d/w him to use saline spray instead     Follow-up 2 week      45 minutes spent on the date of the encounter doing chart review, history and exam, patient education & counseling, documentation and other activities as noted above.    Thank you for involving us in the patient's care.   Elier Serrano MD / Palliative Medicine / Text me via [facility].

## 2021-07-02 NOTE — NURSING NOTE
Chief Complaint   Patient presents with     Blood Draw     Labs drawn from  by RN in lab. Vitals taken. Checked into next appointment.      Labs drawn by venipuncture by RN in lab.  Vital signs taken.  Pt checked in to next appointment.    Aniyah Knowles RN

## 2021-07-08 ENCOUNTER — VIRTUAL VISIT (OUTPATIENT)
Dept: RADIATION ONCOLOGY | Facility: CLINIC | Age: 71
End: 2021-07-08
Attending: RADIOLOGY
Payer: MEDICARE

## 2021-07-08 DIAGNOSIS — C09.9 SQUAMOUS CELL CARCINOMA OF LEFT TONSIL (H): Primary | ICD-10-CM

## 2021-07-08 NOTE — PROGRESS NOTES
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  He is just over 3 weeeks out from treatment and is seeing improvement.  Phlegm is starting to decrease and notices a dry mouth.  His weight has stabilized around 190.  He is eating more and a variety of foods a day now and decreased feedings to 3 cartons a day.   His pain is adequately controlled and palliative care is working with him and he is weaning off methadone.  He is not taking Ambien and has reduced his Xanax amount.  He states he has not been drinking.  He states he is working on his swallowing exercise. He continues to moisturize but skin is healed.  He is using fluoride.  His mood is good. Energy is improving.    ROS:  Complete review of systems is negative except for symptoms discussed in subjective above.    Current Outpatient Medications   Medication     amoxicillin-clavulanate (AUGMENTIN-ES) 600-42.9 MG/5ML suspension     ASPIRIN ADULT LOW STRENGTH 81 MG EC tablet     citalopram (CELEXA) 40 MG tablet     HYDROcodone-acetaminophen (NORCO)  MG per tablet     hydrOXYzine (VISTARIL) 25 MG capsule     methadone (DOLOPHINE) 5 MG tablet     metoprolol succinate ER (TOPROL-XL) 50 MG 24 hr tablet     naloxone (NARCAN) 4 MG/0.1ML nasal spray     nitroGLYcerin (NITROSTAT) 0.4 MG sublingual tablet     omeprazole (PRILOSEC) 40 MG DR capsule     protein modular (PROSOURCE TF) LIQD     rosuvastatin (CRESTOR) 40 MG tablet     testosterone (ANDROGEL 1.62 % PUMP) 20.25 MG/ACT gel     zolpidem (AMBIEN) 10 MG tablet     No  current facility-administered medications for this visit.           Allergies   Allergen Reactions     Animal Dander      Azithromycin Nausea and Vomiting     Dust Mites      Pollen Extract      Smoke.        Past Medical History:   Diagnosis Date     Allergy, unspecified not elsewhere classified     Seasonal allergies, pollen, dust, smoke and animals     Antiplatelet or antithrombotic long-term use      Anxiety      Arthritis      Chest pain      Chronic sinusitis      Coronary atherosclerosis of unspecified type of vessel, native or graft     Coronary artery disease     Depressive disorder 1995     History of blood transfusion 12/15/2004    Prostate Surgery - My own blood     Hyperlipidemia      Hypertension      Inguinal hernia      Kidney stones      Malignant neoplasm of prostate (H)     Prostate cancer     Prostate cancer (H)          PHYSICAL EXAM:  Gen: Alert, in NAD.  Seems more alert this week. Mood is good.      LABS AND IMAGING:  Reviewed.    IMPRESSION:   Mr. Murphy is a 70 year old male with a squamous cell carcinoma of the left tonsil s/p chemoradiation now 3+ weeks out since completion of treatment and is seeing improvement of his symptoms.    PLAN:   1. Follow up with Dr. Whiteside in 3 weeks.   Continue close follow up with ENT and medical oncology.  2. Continue to moisturize with aquaphor and when skin is completely healed can change to preferred moisturizer.  Discussed importance of sun avoidance or sun protection.  3. Fatigue should continue to improve.  4. Continue oral cares with salt and soda rinses.  Routine dental follow up at least every 6 months.  Continue to use fluoride trays or fluoride treatments. Continue jaw, neck and swallowing exercises.  5. Treatment related pain should continue to diminish.  Recommended to slowly wean off pain medications when pain decreases-  He will be on a weaning schedule per palliative care.  6.    Continue to push fluids and caloric intake to maintain weight.   Try to increase oral intake and decrease calories through PEG tube while maintaining weight.      Time on phone:  10 minutes    Maribell Martines NP   Radiation Oncology

## 2021-07-08 NOTE — LETTER
2021         RE: Quan Murphy   [address] [facility] [address]        Dear Colleague,    Thank you for referring your patient, Quan Murphy, to the [facility] RADIATION ONCOLOGY. Please see a copy of my visit note below.    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  He is just over 3 weeeks out from treatment and is seeing improvement.  Phlegm is starting to decrease and notices a dry mouth.  His weight has stabilized around 190.  He is eating more and a variety of foods a day now and decreased feedings to 3 cartons a day.   His pain is adequately controlled and palliative care is working with him and he is weaning off methadone.  He is not taking Ambien and has reduced his Xanax amount.  He states he has not been drinking.  He states he is working on his swallowing exercise. He continues to moisturize but skin is healed.  He is using fluoride.  His mood is good. Energy is improving.    ROS:  Complete review of systems is negative except for symptoms discussed in subjective above.    Current Outpatient Medications   Medication     amoxicillin-clavulanate (AUGMENTIN-ES) 600-42.9 MG/5ML suspension     ASPIRIN ADULT LOW STRENGTH 81 MG EC tablet     citalopram (CELEXA) 40 MG tablet     HYDROcodone-acetaminophen (NORCO)  MG per tablet     hydrOXYzine (VISTARIL) 25 MG capsule     methadone (DOLOPHINE) 5 MG tablet     metoprolol succinate ER (TOPROL-XL) 50 MG 24 hr tablet     naloxone (NARCAN) 4 MG/0.1ML nasal spray     nitroGLYcerin  (NITROSTAT) 0.4 MG sublingual tablet     omeprazole (PRILOSEC) 40 MG DR capsule     protein modular (PROSOURCE TF) LIQD     rosuvastatin (CRESTOR) 40 MG tablet     testosterone (ANDROGEL 1.62 % PUMP) 20.25 MG/ACT gel     zolpidem (AMBIEN) 10 MG tablet     No current facility-administered medications for this visit.           Allergies   Allergen Reactions     Animal Dander      Azithromycin Nausea and Vomiting     Dust Mites      Pollen Extract      Smoke.        Past Medical History:   Diagnosis Date     Allergy, unspecified not elsewhere classified     Seasonal allergies, pollen, dust, smoke and animals     Antiplatelet or antithrombotic long-term use      Anxiety      Arthritis      Chest pain      Chronic sinusitis      Coronary atherosclerosis of unspecified type of vessel, native or graft     Coronary artery disease     Depressive disorder 1995     History of blood transfusion 12/15/2004    Prostate Surgery - My own blood     Hyperlipidemia      Hypertension      Inguinal hernia      Kidney stones      Malignant neoplasm of prostate (H)     Prostate cancer     Prostate cancer (H)          PHYSICAL EXAM:  Gen: Alert, in NAD.  Seems more alert this week. Mood is good.      LABS AND IMAGING:  Reviewed.    IMPRESSION:   Mr. Murphy is a 70 year old male with a squamous cell carcinoma of the left tonsil s/p chemoradiation now 3+ weeks out since completion of treatment and is seeing improvement of his symptoms.    PLAN:   1. Follow up with Dr. Whiteside in 3 weeks.   Continue close follow up with ENT and medical oncology.  2. Continue to moisturize with aquaphor and when skin is completely healed can change to preferred moisturizer.  Discussed importance of sun avoidance or sun protection.  3. Fatigue should continue to improve.  4. Continue oral cares with salt and soda rinses.  Routine dental follow up at least every 6 months.  Continue to use fluoride trays or fluoride treatments. Continue jaw, neck and swallowing  exercises.  5. Treatment related pain should continue to diminish.  Recommended to slowly wean off pain medications when pain decreases-  He will be on a weaning schedule per palliative care.  6.    Continue to push fluids and caloric intake to maintain weight.  Try to increase oral intake and decrease calories through PEG tube while maintaining weight.      Time on phone:  10 minutes    Maribell Martines NP   Radiation Oncology

## 2021-07-14 ENCOUNTER — VIRTUAL VISIT (OUTPATIENT)
Dept: ONCOLOGY | Facility: CLINIC | Age: 71
End: 2021-07-14
Attending: DIETITIAN, REGISTERED
Payer: MEDICARE

## 2021-07-14 DIAGNOSIS — N18.31 STAGE 3A CHRONIC KIDNEY DISEASE (H): ICD-10-CM

## 2021-07-14 DIAGNOSIS — C09.9 SQUAMOUS CELL CARCINOMA OF LEFT TONSIL (H): Primary | ICD-10-CM

## 2021-07-14 PROCEDURE — 999N000109 HC STATISTIC OP CR VISIT

## 2021-07-14 NOTE — PROGRESS NOTES
Nutrition Services:   Called and spoke with both New and his wife, Mounika.     New is frustrated and defeated with his ability to further wean EN; he is down to 3 cartons/day from 5 cartons 3 weeks ago.     He is having a lot of pain in his throat, extreme dry mouth and absolutely no appetite.   He doesn't feel as though if he further weans his EN (to 2 cartons) that it will increase his appetite.  He feels like the tube is 'psychologically decreasing appetite'.       He is currently eating the following:  Breakfast - bites of eggs and applesauce  Lunch - 1/2 bulk 1340 shake made with ice cream, milk and Ensure  Dinner - bites of mashed potatoes, 1/2 cup soup    Weight trends: reported wt today 182 lb at home  July 2nd at home 191.8 lb  July 13th at home 187 lb  July 14th at home 182 lb (? 5 lb wt loss)  Wt Readings from Last 5 Encounters:   07/02/21 90.6 kg (199 lb 12.8 oz)   06/15/21 91.6 kg (202 lb)   06/10/21 93 kg (205 lb)   06/08/21 97.9 kg (215 lb 14.4 oz)   06/03/21 95.7 kg (211 lb)     He is concerned with his weight loss as he is not able to improve his intake with lack of appetite, sore throat and dry mouth.  He denies fatigue with wt loss and has recently started going the gym, engaging in very minimal exercise.  He feels like this has given more energy    He would like to try Medical Marijuana.    He is also willing to try a prescription for Marinol or Megace until he can get registered for Medical marijuana (if able).   He would also like something for dry mouth and sore throat.       Interventions/recommendations:  -Discussed appetite stimulant and potential meds to help improve dry mouth (in addition moist foods, adding water to meals) and sore throat.   -Reviewed moist, calorie dense foods and how to add moisture to foods.     Recommend:  1. Stay at 3 cartons of formula and increase his oral shakes (Boost Plus, Ensure Plus) by one/day  2. Increase formula to 4 cartons/day.  He has lost a lot of  weight in the past few weeks.     Message sent to CNP regarding patients concerns and questions for medication management.     RD will follow-up with patient in 1 week.     Crystal Ramos RDN, GRUPON

## 2021-07-14 NOTE — LETTER
7/14/2021         RE: Quan Murphy  [address] [facility] [address]        Dear Colleague,    Thank you for referring your patient, Quan Murphy, to the [facility] CANCER CLINIC. Please see a copy of my visit note below.    Nutrition Services:   Called and spoke with both New and his wife, Mounika.     New is frustrated and defeated with his ability to further wean EN; he is down to 3 cartons/day from 5 cartons 3 weeks ago.     He is having a lot of pain in his throat, extreme dry mouth and absolutely no appetite.   He doesn't feel as though if he further weans his EN (to 2 cartons) that it will increase his appetite.  He feels like the tube is 'psychologically decreasing appetite'.       He is currently eating the following:  Breakfast - bites of eggs and applesauce  Lunch - 1/2 bulk 1340 shake made with ice cream, milk and Ensure  Dinner - bites of mashed potatoes, 1/2 cup soup    Weight trends: reported wt today 182 lb at home  July 2nd at home 191.8 lb  July 13th at home 187 lb  July 14th at home 182 lb (? 5 lb wt loss)  Wt Readings from Last 5 Encounters:   07/02/21 90.6 kg (199 lb 12.8 oz)   06/15/21 91.6 kg (202 lb)   06/10/21 93 kg (205 lb)   06/08/21 97.9 kg (215 lb 14.4 oz)   06/03/21 95.7 kg (211 lb)     He is concerned with his weight loss as he is not able to improve his intake with lack of appetite, sore throat and dry mouth.  He denies fatigue with wt loss and has recently started going the gym, engaging in very minimal exercise.  He feels like this has given more energy    He would like to try Medical Marijuana.    He is also willing to try a prescription for Marinol or Megace until he can get registered for Medical marijuana (if able).   He would also like something for dry mouth and sore throat.       Interventions/recommendations:  -Discussed appetite stimulant and potential meds to help improve dry mouth (in addition moist foods, adding water to meals) and sore  throat.   -Reviewed moist, calorie dense foods and how to add moisture to foods.     Recommend:  1. Stay at 3 cartons of formula and increase his oral shakes (Boost Plus, Ensure Plus) by one/day  2. Increase formula to 4 cartons/day.  He has lost a lot of weight in the past few weeks.     Message sent to CNP regarding patients concerns and questions for medication management.     RD will follow-up with patient in 1 week.     Crystal Ramos RDN, LDN          Again, thank you for allowing me to participate in the care of your patient.        Sincerely,        Crystal Ramos RD

## 2021-07-15 ENCOUNTER — TELEPHONE (OUTPATIENT)
Dept: SPEECH THERAPY | Facility: CLINIC | Age: 71
End: 2021-07-15

## 2021-07-15 ENCOUNTER — VIRTUAL VISIT (OUTPATIENT)
Dept: RADIATION ONCOLOGY | Facility: CLINIC | Age: 71
End: 2021-07-15
Attending: NURSE PRACTITIONER
Payer: MEDICARE

## 2021-07-15 DIAGNOSIS — C09.9 SQUAMOUS CELL CARCINOMA OF LEFT TONSIL (H): Primary | ICD-10-CM

## 2021-07-15 DIAGNOSIS — C09.9 SQUAMOUS CELL CARCINOMA OF LEFT TONSIL (H): ICD-10-CM

## 2021-07-15 DIAGNOSIS — G89.3 NEOPLASM RELATED PAIN: ICD-10-CM

## 2021-07-15 RX ORDER — HYDROCODONE BITARTRATE AND ACETAMINOPHEN 10; 325 MG/1; MG/1
1-2 TABLET ORAL EVERY 4 HOURS PRN
Qty: 150 TABLET | Refills: 0 | Status: SHIPPED | OUTPATIENT
Start: 2021-07-17 | End: 2021-07-27

## 2021-07-15 RX ORDER — CEVIMELINE HYDROCHLORIDE 30 MG/1
30 CAPSULE ORAL 3 TIMES DAILY
Qty: 120 CAPSULE | Refills: 11 | Status: SHIPPED | OUTPATIENT
Start: 2021-07-15 | End: 2021-07-19

## 2021-07-15 RX ORDER — METHADONE HYDROCHLORIDE 5 MG/1
7.5 TABLET ORAL 3 TIMES DAILY
Qty: 135 TABLET | Refills: 0 | Status: SHIPPED | OUTPATIENT
Start: 2021-07-20 | End: 2021-07-16

## 2021-07-15 RX ORDER — DRONABINOL 2.5 MG/1
2.5 CAPSULE ORAL
Qty: 28 CAPSULE | Refills: 3 | Status: SHIPPED | OUTPATIENT
Start: 2021-07-15 | End: 2023-02-08

## 2021-07-15 NOTE — LETTER
7/15/2021         RE: Quan Murphy   [address]  [facility] [address]        Dear Colleague,    Thank you for referring your patient, Quan Murphy, to the [facility] RADIATION ONCOLOGY. Please see a copy of my visit note below.    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  He he is 1 month out from treatment and I received a message from the dietitian about his decreased appetite and weight loss.  Quan states that he thought he had lost 5 pounds but yesterday afternoon he weighed himself again and his weight was stable.  He did try to call the dietitian back and let her know.  He still has no appetite and decreased taste which is making it hard for him to eat.  He would like to try Marinol as he discussed it with the dietitian.  He also is struggling with a dry mouth and would like to try something to help with his dry mouth.  He is drinking lots of water.  Also discussed Biotene products.  He is working with palliative care on decreasing his pain meds.  He really wants to get his feeding tube out.    ROS:  Complete review of systems is negative except for symptoms discussed in subjective above.    Current Outpatient Medications   Medication     cevimeline (EVOXAC) 30 MG capsule     dronabinol (MARINOL) 2.5 MG capsule     amoxicillin-clavulanate (AUGMENTIN-ES) 600-42.9 MG/5ML suspension     ASPIRIN ADULT LOW STRENGTH 81 MG EC tablet     citalopram (CELEXA) 40 MG tablet     HYDROcodone-acetaminophen (NORCO)  MG per tablet     hydrOXYzine  (VISTARIL) 25 MG capsule     methadone (DOLOPHINE) 5 MG tablet     metoprolol succinate ER (TOPROL-XL) 50 MG 24 hr tablet     naloxone (NARCAN) 4 MG/0.1ML nasal spray     nitroGLYcerin (NITROSTAT) 0.4 MG sublingual tablet     omeprazole (PRILOSEC) 40 MG DR capsule     rosuvastatin (CRESTOR) 40 MG tablet     testosterone (ANDROGEL 1.62 % PUMP) 20.25 MG/ACT gel     zolpidem (AMBIEN) 10 MG tablet     No current facility-administered medications for this visit.          Allergies   Allergen Reactions     Animal Dander      Azithromycin Nausea and Vomiting     Dust Mites      Pollen Extract      Smoke.        Past Medical History:   Diagnosis Date     Allergy, unspecified not elsewhere classified     Seasonal allergies, pollen, dust, smoke and animals     Antiplatelet or antithrombotic long-term use      Anxiety      Arthritis      Chest pain      Chronic sinusitis      Coronary atherosclerosis of unspecified type of vessel, native or graft     Coronary artery disease     Depressive disorder 1995     History of blood transfusion 12/15/2004    Prostate Surgery - My own blood     Hyperlipidemia      Hypertension      Inguinal hernia      Kidney stones      Malignant neoplasm of prostate (H)     Prostate cancer     Prostate cancer (H)          PHYSICAL EXAM:  Gen: Alert, in NAD.  Seems more alert this week. Mood is good.  Weight at home today 186lbs    LABS AND IMAGING:  Reviewed.    IMPRESSION:   Mr. Murphy is a 70 year old male with a squamous cell carcinoma of the left tonsil s/p chemoradiation now 3+ weeks out since completion of treatment and is seeing improvement of his symptoms.    PLAN:   1. Follow up with Dr. Whiteside in 2 weeks.  2. Try Marinol twice daily to see if it improves appetite.  3. Evoxac prescribed for dry mouth.  Discussed other ways to manage xerostomia.        Time on phone:  8 minutes    Maribell Martines NP   Radiation Oncology

## 2021-07-15 NOTE — TELEPHONE ENCOUNTER
Spoke with pt and his wife today via telephone. Reports decreased PO intake d/t decreased appetite and xerostomia. Encouraged pt to try to increase daily PO especially in regards to improving dysgeusia and working towards weaning off PEG. Pt agreeable. Pt plans to continue following with Dietician. Plan to see pt for formal therapy session on 7/30 in conjunction with ENT clinic visit.     JAJA Vaca MA (music), CCC-SLP   Speech Language Pathologist  [place] Trained Vocologist   [facility] [place]  Dept. of Otolaryngology  Department of Rehabilitation Services  [address] [place] [id number]  Email: [email or web address]   Phone: [phone]  Pronouns: she/her/hers

## 2021-07-15 NOTE — PROGRESS NOTES
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  He he is 1 month out from treatment and I received a message from the dietitian about his decreased appetite and weight loss.  Quan states that he thought he had lost 5 pounds but yesterday afternoon he weighed himself again and his weight was stable.  He did try to call the dietitian back and let her know.  He still has no appetite and decreased taste which is making it hard for him to eat.  He would like to try Marinol as he discussed it with the dietitian.  He also is struggling with a dry mouth and would like to try something to help with his dry mouth.  He is drinking lots of water.  Also discussed Biotene products.  He is working with palliative care on decreasing his pain meds.  He really wants to get his feeding tube out.    ROS:  Complete review of systems is negative except for symptoms discussed in subjective above.    Current Outpatient Medications   Medication     cevimeline (EVOXAC) 30 MG capsule     dronabinol (MARINOL) 2.5 MG capsule     amoxicillin-clavulanate (AUGMENTIN-ES) 600-42.9 MG/5ML suspension     ASPIRIN ADULT LOW STRENGTH 81 MG EC tablet     citalopram (CELEXA) 40 MG tablet     HYDROcodone-acetaminophen (NORCO)  MG per tablet     hydrOXYzine (VISTARIL) 25 MG capsule     methadone (DOLOPHINE) 5 MG tablet     metoprolol succinate ER (TOPROL-XL) 50 MG 24 hr tablet     naloxone (NARCAN) 4 MG/0.1ML nasal spray     nitroGLYcerin (NITROSTAT) 0.4 MG sublingual tablet     omeprazole (PRILOSEC) 40 MG   capsule     rosuvastatin (CRESTOR) 40 MG tablet     testosterone (ANDROGEL 1.62 % PUMP) 20.25 MG/ACT gel     zolpidem (AMBIEN) 10 MG tablet     No current facility-administered medications for this visit.          Allergies   Allergen Reactions     Animal Dander      Azithromycin Nausea and Vomiting     Dust Mites      Pollen Extract      Smoke.        Past Medical History:   Diagnosis Date     Allergy, unspecified not elsewhere classified     Seasonal allergies, pollen, dust, smoke and animals     Antiplatelet or antithrombotic long-term use      Anxiety      Arthritis      Chest pain      Chronic sinusitis      Coronary atherosclerosis of unspecified type of vessel, native or graft     Coronary artery disease     Depressive disorder 1995     History of blood transfusion 12/15/2004    Prostate Surgery - My own blood     Hyperlipidemia      Hypertension      Inguinal hernia      Kidney stones      Malignant neoplasm of prostate (H)     Prostate cancer     Prostate cancer (H)          PHYSICAL EXAM:  Gen: Alert, in NAD.  Seems more alert this week. Mood is good.  Weight at home today 186lbs    LABS AND IMAGING:  Reviewed.    IMPRESSION:   Mr. Murphy is a 70 year old male with a squamous cell carcinoma of the left tonsil s/p chemoradiation now 3+ weeks out since completion of treatment and is seeing improvement of his symptoms.    PLAN:   1. Follow up with Dr. Whiteside in 2 weeks.  2. Try Marinol twice daily to see if it improves appetite.  3. Evoxac prescribed for dry mouth.  Discussed other ways to manage xerostomia.        Time on phone:  8 minutes    Maribell Martines NP   Radiation Oncology

## 2021-07-15 NOTE — TELEPHONE ENCOUNTER
Received note from triage patient requesting refills of pain meds.    Last refill norco: 7/2/2021  Last refill methadone: 6/22/2021  Last office visit: 7/2/2021  Message sent to  for follow up apt.    [place]  Report reviewed.

## 2021-07-16 ENCOUNTER — VIRTUAL VISIT (OUTPATIENT)
Dept: PALLIATIVE CARE | Facility: CLINIC | Age: 71
End: 2021-07-16
Attending: INTERNAL MEDICINE
Payer: MEDICARE

## 2021-07-16 DIAGNOSIS — G89.3 NEOPLASM RELATED PAIN: ICD-10-CM

## 2021-07-16 DIAGNOSIS — C09.9 SQUAMOUS CELL CARCINOMA OF LEFT TONSIL (H): ICD-10-CM

## 2021-07-16 PROCEDURE — 999N001193 HC VIDEO/TELEPHONE VISIT; NO CHARGE

## 2021-07-16 PROCEDURE — 99442 PR PHYSICIAN TELEPHONE EVALUATION 11-20 MIN: CPT | Mod: 95 | Performed by: INTERNAL MEDICINE

## 2021-07-16 RX ORDER — METHADONE HYDROCHLORIDE 5 MG/1
2.5 TABLET ORAL 3 TIMES DAILY
Qty: 28 TABLET | Refills: 0 | Status: SHIPPED | OUTPATIENT
Start: 2021-07-20 | End: 2021-07-22

## 2021-07-16 NOTE — PROGRESS NOTES
"New is a 70 year old who is being evaluated via a billable telephone visit.      What phone number would you like to be contacted at? [phone]  How would you like to obtain your AVS? Kash     Vitals - Patient Reported  Weight (Patient Reported): 86.2 kg (190 lb)  Height (Patient Reported): 174 cm (5' 8.5\")  BMI (Based on Pt Reported Ht/Wt): 28.47  Pain Score: Severe Pain (6)  Pain Loc: Throat    [facility]iquis [facility]      Palliative Care Outpatient Clinic    (This note was transcribed using voice recognition software. While I review and edit the transcription, I may miss errors, and the software sometimes does unexpected capitalizations and formatting that I miss. Please let me know of any serious mistranscriptions and I will addend this note.)    Patient ID:  Medical - He has SCC L tonsil dx 3/2021 [id number] p16+  Definitive chemoradiotherapy 4-6/2021 6/2/2021 admitted with FTT/urgent need for GT placement and had a respiratory arrest 2 d later inpatient thought to be drug-induced. Got PEG and was discharged.  6/12/2021 radiation end date     He has CKD and chronic pain (headaches/sinus pain) on long-term hydrocodone therapy.  On ambien and alprazolam for insomnia/anxiety     Social - Lives with wife. 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      Opioid Safety -  +naloxone  See 5/14/2021 opioid risk/safety discussion; I consider him higher risk due to long history of higher risk polysubstance use (opioids, benzos, alcohol), self-titration of meds, anxiety. Expectations for opioid tapering after cancer treatment discussed. Knows plan is to taper him down to his baseline opioid use and will 'hand back' his chronic pain management to his PCP at that point.       History:  History gathered today from: patient, medical chart    Last visit decreased methadone to 5mg tid.    \"We are making progress, but just not as fast as I'd have hoped.\"  Throat pain " continues, hurts to swallow mckenzie solids.  No major problems with decreasing the methadone. He actually is taking it 5mg bid sometimes not tid as Rx'd.     Dry mouth, anorectic; tough to swallow. Maribell Shalondataurus ordered cevimeline which he has not yet received due to insurance.    PE: There were no vitals taken for this visit.   Wt Readings from Last 3 Encounters:   07/02/21 90.6 kg (199 lb 12.8 oz)   06/15/21 91.6 kg (202 lb)   06/10/21 93 kg (205 lb)     Sounds good on phone--voice strong, not wet, no dysarthria  Clear sensorium      Data reviewed:  I reviewed recent labs and imaging, my comments:  Cr 1.14     database reviewed: y      Impression & Recommendations:  69 yo with H&N cancer s/p definitive chemorads complicated by severe pain    Decrease methadone to 2.5 mg tid for 2 weeks then 2.5mg bid for one week then stop.  Continue hydrocodone prn; I don't expect he'll be readily reducing the dose during the methadone taper.  He knows I'm gone for a few weeks.   I'll see him mid Aug when I'm back and actively taper hydrocodone then.   He knows to call [facility] RN with issues in the interim      Xerostomia: it's possible his PA for cevimeline has been lost I'll reach out to rad onc    16 min on phone.    Thank you for involving us in the patient's care.   Elier Serrano MD / Palliative Medicine / Text me via [facility].

## 2021-07-16 NOTE — LETTER
"7/16/2021       RE: Quan Murphy  [address]  [place] [address]     Dear Colleague,    Thank you for referring your patient, Quan Murphy, [facility]. Please see a copy of my visit note below.    Vitals - Patient Reported  Weight (Patient Reported): 86.2 kg (190 lb)  Height (Patient Reported): 174 cm (5' 8.5\")  BMI (Based on Pt Reported Ht/Wt): 28.47  Pain Score: Severe Pain (6)  Pain Loc: Throat    Chiquis [facility]    Palliative Care Outpatient Clinic    (This note was transcribed using voice recognition software. While I review and edit the transcription, I may miss errors, and the software sometimes does unexpected capitalizations and formatting that I miss. Please let me know of any serious mistranscriptions and I will addend this note.)    Patient ID:  Medical - He has SCC L tonsil dx 3/2021 [id number] p16+  Definitive chemoradiotherapy 4-6/2021 6/2/2021 admitted with FTT/urgent need for GT placement and had a respiratory arrest 2 d later inpatient thought to be drug-induced. Got PEG and was discharged.  6/12/2021 radiation end date     He has CKD and chronic pain (headaches/sinus pain) on long-term hydrocodone therapy.  On ambien and alprazolam for insomnia/anxiety     Social - Lives with wife. 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      Opioid Safety -  +naloxone  See 5/14/2021 opioid risk/safety discussion; I consider him higher risk due to long history of higher risk polysubstance use (opioids, benzos, alcohol), self-titration of meds, anxiety. Expectations for opioid tapering after cancer treatment discussed. Knows plan is to taper him down to his baseline opioid use and will 'hand back' his chronic pain management to his PCP at that point.     History:  History gathered today from: patient, medical chart    Last visit decreased " "methadone to 5mg tid.    \"We are making progress, but just not as fast as I'd have hoped.\"  Throat pain continues, hurts to swallow mckenzie solids.  No major problems with decreasing the methadone. He actually is taking it 5mg bid sometimes not tid as Rx'd.     Dry mouth, anorectic; tough to swallow. Maribell Martines ordered cevimeline which he has not yet received due to insurance.  PE: There were no vitals taken for this visit.   Wt Readings from Last 3 Encounters:   07/02/21 90.6 kg (199 lb 12.8 oz)   06/15/21 91.6 kg (202 lb)   06/10/21 93 kg (205 lb)     Sounds good on phone--voice strong, not wet, no dysarthria  Clear sensorium    Data reviewed:  I reviewed recent labs and imaging, my comments:  [place] 1.14     database reviewed: y    Impression & Recommendations:  71 yo with H&N cancer s/p definitive chemorads complicated by severe pain    Decrease methadone to 2.5 mg tid for 2 weeks then 2.5mg bid for one week then stop.  Continue hydrocodone prn; I don't expect he'll be readily reducing the dose during the methadone taper.  He knows I'm gone for a few weeks.   I'll see him mid Aug when I'm back and actively taper hydrocodone then.   He knows to call [facility] RN with issues in the interim    Xerostomia: it's possible his PA for cevimeline has been lost I'll reach out to rad onc    16 min on phone.    Again, thank you for allowing me to participate in the care of your patient.      Sincerely,  Elier Serrano MD  "

## 2021-07-19 ENCOUNTER — DOCUMENTATION ONLY (OUTPATIENT)
Dept: RADIATION ONCOLOGY | Facility: CLINIC | Age: 71
End: 2021-07-19

## 2021-07-19 DIAGNOSIS — K11.7 XEROSTOMIA: ICD-10-CM

## 2021-07-19 DIAGNOSIS — C09.9 SQUAMOUS CELL CARCINOMA OF LEFT TONSIL (H): Primary | ICD-10-CM

## 2021-07-19 RX ORDER — PILOCARPINE HYDROCHLORIDE 5 MG/1
5 TABLET, FILM COATED ORAL 3 TIMES DAILY
Qty: 90 TABLET | Refills: 11 | Status: SHIPPED | OUTPATIENT
Start: 2021-07-19 | End: 2021-07-19

## 2021-07-19 RX ORDER — CEVIMELINE HYDROCHLORIDE 30 MG/1
30 CAPSULE ORAL 3 TIMES DAILY
Qty: 120 CAPSULE | Refills: 11
Start: 2021-07-19 | End: 2021-08-19

## 2021-07-19 NOTE — PROGRESS NOTES
I was notified that his Evoxac was not covered so sent a script for Salagen.  As soon as a new script was sent, he was approved for the Evoxac.  He will try the evoxac.

## 2021-07-20 ENCOUNTER — VIRTUAL VISIT (OUTPATIENT)
Dept: ONCOLOGY | Facility: CLINIC | Age: 71
End: 2021-07-20
Payer: MEDICARE

## 2021-07-20 DIAGNOSIS — C61 MALIGNANT NEOPLASM OF PROSTATE (H): Primary | ICD-10-CM

## 2021-07-20 PROCEDURE — 99207 PR NO CHARGE LOS: CPT | Performed by: DIETITIAN, REGISTERED

## 2021-07-20 NOTE — LETTER
7/20/2021         RE: Quan Murphy  [address] [place] [address]        Dear Colleague,    Thank you for referring your patient, Quan Murphy, to the [facility] [place] [facility]. Please see a copy of my visit note below.    Nutrition Services - Brief note:    Called New and his wife, Mounika today to follow-up on concerns of xerostomia and low appetite for PO.   He tells me that he will be picking up his medication for dry mouth (Evoxac) today after 4pm as it took a week to get approved.   He tells me that he has not obtained Marinol as his pharmacy does not carry it.      His biggest concern at this time is lack of pain control and xerostomia.    He would like to get these under control and see if this improves his appetite before he starts an appetite stimulant.     He remains on 4 cartons of tube feeding formula (Jevity 1.5 debby) daily.   He has been eating small bites of soft foods as well:  Breakfast - bites of eggs and applesauce  Lunch - 1/2 bulk 1340 shake made with ice cream, milk and Ensure  Dinner - bites of mashed potatoes, 1/2 cup soup    He tells me that his weight has been stable at 186 lb on his home scale.   Wt Readings from Last 6 Encounters:   07/02/21 90.6 kg (199 lb 12.8 oz)   06/15/21 91.6 kg (202 lb)   06/10/21 93 kg (205 lb)   06/08/21 97.9 kg (215 lb 14.4 oz)   06/03/21 95.7 kg (211 lb)   06/02/21 94.7 kg (208 lb 11.2 oz)     He will continue to take 4 cartons of EN formula daily in addition to striving for more PO intake.    RD will follow-up in one week    Crystal Ramos RDN, LDN  [facility]  [phone]            Again, thank you for allowing me to participate in the care of your patient.        Sincerely,        Crystal Ramos RD

## 2021-07-20 NOTE — PROGRESS NOTES
Nutrition Services - Brief note:    Called New and his wife, Mounika today to follow-up on concerns of xerostomia and low appetite for PO.   He tells me that he will be picking up his medication for dry mouth (Evoxac) today after 4pm as it took a week to get approved.   He tells me that he has not obtained Marinol as his pharmacy does not carry it.      His biggest concern at this time is lack of pain control and xerostomia.    He would like to get these under control and see if this improves his appetite before he starts an appetite stimulant.     He remains on 4 cartons of tube feeding formula (Jevity 1.5 debby) daily.   He has been eating small bites of soft foods as well:  Breakfast - bites of eggs and applesauce  Lunch - 1/2 bulk 1340 shake made with ice cream, milk and Ensure  Dinner - bites of mashed potatoes, 1/2 cup soup    He tells me that his weight has been stable at 186 lb on his home scale.   Wt Readings from Last 6 Encounters:   07/02/21 90.6 kg (199 lb 12.8 oz)   06/15/21 91.6 kg (202 lb)   06/10/21 93 kg (205 lb)   06/08/21 97.9 kg (215 lb 14.4 oz)   06/03/21 95.7 kg (211 lb)   06/02/21 94.7 kg (208 lb 11.2 oz)     He will continue to take 4 cartons of EN formula daily in addition to striving for more PO intake.    RD will follow-up in one week    Crystal Ramos, SRIKANTH, CELY  [facility]  [phone]

## 2021-07-22 ENCOUNTER — PATIENT OUTREACH (OUTPATIENT)
Dept: PALLIATIVE CARE | Facility: CLINIC | Age: 71
End: 2021-07-22

## 2021-07-22 DIAGNOSIS — G89.3 NEOPLASM RELATED PAIN: ICD-10-CM

## 2021-07-22 DIAGNOSIS — C09.9 SQUAMOUS CELL CARCINOMA OF LEFT TONSIL (H): ICD-10-CM

## 2021-07-22 RX ORDER — METHADONE HYDROCHLORIDE 5 MG/1
5 TABLET ORAL 2 TIMES DAILY
Qty: 28 TABLET | Refills: 0 | COMMUNITY
Start: 2021-07-22 | End: 2021-09-08

## 2021-07-22 NOTE — PROGRESS NOTES
Called him    Let's slow down the methadone taper.     Methadone 5mg bid for two weeks.   Then 2.5mg in morning then 5mg in evening.   Then I'll be back and we'll go from there.  D/w him often when the healing really kicks in pain can get better fast but that hasn't happened yet for him.    Gabriella can you call him in a week thank you.  I can reorder more methadone for him before I go next week -- I havne't done the math tho on when he will run out    Leave at 10 hydrocones a day

## 2021-07-22 NOTE — PROGRESS NOTES
"Received VM from patient asking for call back. In his message he reports that he is struggling with pain \"really, really bad.\"    Spoke with Dr. Serrano last week and had a plan for methadone wean with steady hydrocodone. States weaning off methadone he is fine with, doesn't like methadone overall. Doesn't think it is doing much and makes him feel wobbly.     Hydrocodone has always been his \"go to\" but he worries he has built a tolerance as he feels the allowed amount (10 tabs per day) is not doing it anymore.     Wants to continue methadone wean, but something stronger to replace hydrocodone. States when he hurts so bad he can't eat or swallow.     States he feels he needs something stronger, what he is taking now is not doing it anymore. Further states hs is \"begging for help\" and has real concerns with pain.      Also notes that MD has patient at zolpidem 10mg per day, he wants to go up to 15-20mg as that is the only thing that helps with sleep.    --    Reviewed last office visit note form patient's visit last week.     Per note:     \"Decrease methadone to 2.5 mg tid for 2 weeks then 2.5mg bid for one week then stop.  Continue hydrocodone prn; I don't expect he'll be readily reducing the dose during the methadone taper.  He knows I'm gone for a few weeks.   I'll see him mid Aug when I'm back and actively taper hydrocodone then.   He knows to call [facility] RN with issues in the interim\"    --    Called patient to discuss. He says that he can swallow a bit better and that the sores in his throat are slowly starting to help, however pain is bad. States if he didn't have to eat he would probably be okay. But if he is going to eat and gain weight, he needs help as pain is out of control.    He tells me that swallowing anything except pure liquid (and even when swallowing pure liquid), his throat hurts and burns like fire.    He further states that he does not want to run out of his meds.     I asked how he was " "taking his meds - he reports he is taking methadone as scheduled, 0.5 tab TID for a total of 1.5 tab per day since speaking with MD last week. Regarding the hydrocodone, he says he is allowed up to 10 per day, but has been averaging 12 per day so he is going to be a little short this month.     Discussed with patient that he should not be self-escalating his pain medications on his own and that if his prescribed amount isn't working he needs to call us. Explained I was worried about his safety.      He got very upset with me. He says over the last week he realized he was taking more so called us right away. He says when he is in pain he takes what he needs to in order to stop hurting.     He says he knows we have had a conversation about this before and since our conversation he has been \"very good,\" including stopping xanax and alcohol.     He tells me that he tried to call me last week but no one called him back. I was off last week and explained I was alarmed to hear no one returned his call. Upon further discussion, he says pain got worse after he spoke with Dr. Serrano last Friday and that he tried to call me earlier this week. Again explained I had no VMs from him - has has my direct phone # and knows how to reach us directly. He cannot tell me where he left his message.    He says that when the methadone was decreased was when he started to use more hydrocodone.He says he just gets so tired of pain that all he wants is for it to stop. He tells me that he is doing the best he can.     He tells me that he has #85 tabs of hydrocodone left at the time of our call.     He also asks me about zolpidem - he is on 10mg. Doesn't think it is as good as ambien. Explained those are the same medications, zolpidem is generic for ambien. He says that explains it and generics do not work as well as brand name for him. He wants to be able to take a 0.5-1 tab extra for sleep at night as that is what helps. Explained his PCP " "office was managing this med, not our team.    He further tells me that he really wants to be off methadone. He doesn't like how it makes him feels and it doesn't help. Pointed out that with decreased methadone his pain seemed to increase, he agrees but says he wants to be off methadone.    He says he feels the approach to his pain should be to \"knock it out completely and then cut back\" and not to just use the most minimal amount. He states \"isn't that what palliative care is about?\" \"Give me what I need and then cut back.\"    He says his main goal is to get the gtube out, but he can't eat without pain.     He states he wasn't to reiterate 2 things:   1. He is sorry he is angry and upset, he his just hurting bad  2. He believes I did not get his message, but he called.     He says he hasn't eaten breakfast yet due to pain, but knows he needs to. He says pain is getting old and taking a toll on him mentally, physically, and spiritually.     Advised I would get this update to Dr. Serrano to review. He would like a call back today if possible.     21 mins spent on the phone with patient.  "

## 2021-07-26 DIAGNOSIS — G47.01 SLEEP DISORDER DUE TO A GENERAL MEDICAL CONDITION, INSOMNIA TYPE: ICD-10-CM

## 2021-07-26 DIAGNOSIS — G89.3 NEOPLASM RELATED PAIN: ICD-10-CM

## 2021-07-26 DIAGNOSIS — C09.9 SQUAMOUS CELL CARCINOMA OF LEFT TONSIL (H): ICD-10-CM

## 2021-07-27 ENCOUNTER — VIRTUAL VISIT (OUTPATIENT)
Dept: ONCOLOGY | Facility: CLINIC | Age: 71
End: 2021-07-27
Payer: MEDICARE

## 2021-07-27 DIAGNOSIS — G89.3 NEOPLASM RELATED PAIN: ICD-10-CM

## 2021-07-27 DIAGNOSIS — C09.9 SQUAMOUS CELL CARCINOMA OF LEFT TONSIL (H): ICD-10-CM

## 2021-07-27 DIAGNOSIS — C61 MALIGNANT NEOPLASM OF PROSTATE (H): Primary | ICD-10-CM

## 2021-07-27 PROCEDURE — 97803 MED NUTRITION INDIV SUBSEQ: CPT | Mod: 95 | Performed by: DIETITIAN, REGISTERED

## 2021-07-27 RX ORDER — HYDROCODONE BITARTRATE AND ACETAMINOPHEN 10; 325 MG/1; MG/1
1-2 TABLET ORAL EVERY 4 HOURS PRN
Qty: 150 TABLET | Refills: 0 | Status: SHIPPED | OUTPATIENT
Start: 2021-08-02 | End: 2021-07-27

## 2021-07-27 RX ORDER — ZOLPIDEM TARTRATE 10 MG/1
TABLET ORAL
Qty: 30 TABLET | Refills: 0 | Status: SHIPPED | OUTPATIENT
Start: 2021-07-27 | End: 2021-08-18

## 2021-07-27 NOTE — TELEPHONE ENCOUNTER
Routing to covering provider to review. PCP out of clinic.  Routing refill request to provider for review/approval because:  Drug not on the [facility] refill protocol     Last Written Prescription Date:  2/12/2021  Last Fill Quantity: 30,  # refills: 5   Last office visit: 6/15/2021 with prescribing provider:  demetri     Requested Prescriptions   Pending Prescriptions Disp Refills     zolpidem (AMBIEN) 10 MG tablet [Pharmacy Med Name: ZOLPIDEM TARTRATE 10MG TABS] 30 tablet 0     Sig: TAKE 1 TABLET BY MOUTH EVERY EVENING AS NEEDED FOR SLEEP       There is no refill protocol information for this order        Chela Garsia RN on 7/27/2021 at 9:24 AM

## 2021-07-27 NOTE — PROGRESS NOTES
"The patient has been notified of the following:      \"We have found that certain health care needs can be provided without the need for a face to face visit.  This service lets us provide the care you need with a phone conversation.       I will have full access to your [facility] medical record during this entire phone call.   I will be taking notes for your medical record.      Since this is like an office visit, we will bill your insurance company for this service.       There are potential benefits and risks of telephone visits (e.g. limits to patient confidentiality) that differ from in-person visits.?  Confidentiality still applies for telephone services, and nobody will record the visit.  It is important to be in a quiet, private space that is free of distractions (including cell phone or other devices) during the visit.??      If during the course of the call I believe a telephone visit is not appropriate, you will not be charged for this service\"     Consent has been obtained for this service by care team member: Yes     CLINICAL NUTRITION SERVICES - REASSESSMENT NOTE   EVALUATION OF PREVIOUS PLAN OF CARE:   Referring Physician: Itzel  Time spent with patient: 30 minutes.    Current diet: soft foods, liquids only  Current appetite/intake: poor  PEG Tube: dependent    Monitoring from previous assessment:   -Food intake - taking only bites of cream based soup. He admits to struggling most with cotton mouth and lack of appetite with this.  This has significantly inhibited his ability and desire to take soft/solid foods.  He tells me that his throat pain has been slightly better.   He tend to do better with home made shakes and Ensure.    He also feels as though his feeding tube is psychologically inhibiting his ability to move forward and progress PO intake.  He expresses he desire to wanting it out.    -Liquid meal replacement or supplement - Ensure Plus - using for home made shakes with Bulk 1340  -EN intake - " remains on 3-5 cartons of formula/day (Jevity 1.5 debby with prosource BID).   -Weight trends - stable on home scale  Wt Readings from Last 6 Encounters:   07/02/21 90.6 kg (199 lb 12.8 oz)   06/15/21 91.6 kg (202 lb)   06/10/21 93 kg (205 lb)   06/08/21 97.9 kg (215 lb 14.4 oz)   06/03/21 95.7 kg (211 lb)   06/02/21 94.7 kg (208 lb 11.2 oz)     Previous Goals:   1. EN and PO to provided 100% of est nutrition needs with weaning of EN  2. Weight stability through weaning process  Evaluation: Not met   Previous Nutrition Diagnosis:   Inadequate oral intake related to dysgeusia, mucositis as evidenced by pt dependent upon EN to meet 75% of nutrition needs.   Evaluation: Improving   NEW FINDINGS:   No new findings   CURRENT NUTRITION DIAGNOSIS   Inadequate oral intake related to xerostomia and decreased appetite as evidenced by inability to wean off tube feeding with lack of PO intake.    INTERVENTIONS   Recommendations / Nutrition Prescription   1. Start replacing all EN with PO shakes (Ensure Plus, Bulk 1340) pending SLP clearance  2. Aim for at least 1936-5932 debby and 80-100g protein/day via PO intake   Implementation  Composition of Meals and Snacks - discussed that if he can replace EN with ONS PO, and stabalize his weight x 1 month with sole PO intake, this may justify have feeding tube removed.    Reviewed oral calorie and protein goals as above.   Goals   1. PO intake of >2000 debby, 80g protein/day   2. Weight stability at 200 lbs     Follow up/Monitoring: Follow-up in 2 weeks.   Food intake  Enteral Nutrition intake/wean  Weight trends     Crystal Ramos, SRIKANTH, LDN  [facility]  [phone]

## 2021-07-27 NOTE — LETTER
"    7/27/2021         RE: Quan Murphy  [address] [facility] [address]        Dear Colleague,    Thank you for referring your patient, Quan Murphy, to the [facility] [place]. Please see a copy of my visit note below.    The patient has been notified of the following:      \"We have found that certain health care needs can be provided without the need for a face to face visit.  This service lets us provide the care you need with a phone conversation.       I will have full access to your [facility] medical record during this entire phone call.   I will be taking notes for your medical record.      Since this is like an office visit, we will bill your insurance company for this service.       There are potential benefits and risks of telephone visits (e.g. limits to patient confidentiality) that differ from in-person visits.?  Confidentiality still applies for telephone services, and nobody will record the visit.  It is important to be in a quiet, private space that is free of distractions (including cell phone or other devices) during the visit.??      If during the course of the call I believe a telephone visit is not appropriate, you will not be charged for this service\"     Consent has been obtained for this service by care team member: Yes     CLINICAL NUTRITION SERVICES - REASSESSMENT NOTE   EVALUATION OF PREVIOUS PLAN OF CARE:   Referring Physician: Itzel  Time spent with patient: 30 minutes.    Current diet: soft foods, liquids only  Current appetite/intake: poor  PEG Tube: dependent    Monitoring from previous assessment:   -Food intake - taking only bites of cream based soup. He admits to struggling most with cotton mouth and lack of appetite with this.  This has significantly inhibited his ability and desire to take soft/solid foods.  He tells me that his throat pain has been slightly better.   He tend to do better with home made shakes and Ensure.    He also feels as though " his feeding tube is psychologically inhibiting his ability to move forward and progress PO intake.  He expresses he desire to wanting it out.    -Liquid meal replacement or supplement - Ensure Plus - using for home made shakes with Bulk 1340  -EN intake - remains on 3-5 cartons of formula/day (Jevity 1.5 debby with prosource BID).   -Weight trends - stable on home scale  Wt Readings from Last 6 Encounters:   07/02/21 90.6 kg (199 lb 12.8 oz)   06/15/21 91.6 kg (202 lb)   06/10/21 93 kg (205 lb)   06/08/21 97.9 kg (215 lb 14.4 oz)   06/03/21 95.7 kg (211 lb)   06/02/21 94.7 kg (208 lb 11.2 oz)     Previous Goals:   1. EN and PO to provided 100% of est nutrition needs with weaning of EN  2. Weight stability through weaning process  Evaluation: Not met   Previous Nutrition Diagnosis:   Inadequate oral intake related to dysgeusia, mucositis as evidenced by pt dependent upon EN to meet 75% of nutrition needs.   Evaluation: Improving   NEW FINDINGS:   No new findings   CURRENT NUTRITION DIAGNOSIS   Inadequate oral intake related to xerostomia and decreased appetite as evidenced by inability to wean off tube feeding with lack of PO intake.    INTERVENTIONS   Recommendations / Nutrition Prescription   1. Start replacing all EN with PO shakes (Ensure Plus, Bulk 1340) pending SLP clearance  2. Aim for at least 6379-0558 debby and 80-100g protein/day via PO intake   Implementation  Composition of Meals and Snacks - discussed that if he can replace EN with ONS PO, and stabalize his weight x 1 month with sole PO intake, this may justify have feeding tube removed.    Reviewed oral calorie and protein goals as above.   Goals   1. PO intake of >2000 debby, 80g protein/day   2. Weight stability at 200 lbs     Follow up/Monitoring: Follow-up in 2 weeks.   Food intake  Enteral Nutrition intake/wean  Weight trends     Crystal Ramos RDN, [place]  [facility]  [phone]                Again, thank you for allowing  me to participate in the care of your patient.        Sincerely,        Crystal Ramos RD

## 2021-07-27 NOTE — TELEPHONE ENCOUNTER
Received [employer] message from pharmacy requesting refill of hydrocodone.      Last refill: 7/18 (15 day supply), pt spoke with RN and MD on 7/22, stated he was taking more than 10 tabs/day. Since methadone was adjusted to a slower taper he is taking 10 tabs daily of Norco, however will run out on Friday. Dr. Serrano aware and ok with early fill on 7/30/21  Last office visit: 7/16/21  Scheduled for follow up 8/18/21      Will route request to MD for review.      Reviewed [place]  Report.

## 2021-07-27 NOTE — TELEPHONE ENCOUNTER
Received [employer] message from pharmacy requesting refill of hydrocodone.     Last refill: 7/18 (15 day supply), earliest fill date adjusted to 8/2  Last office visit: 7/16/21  Scheduled for follow up 8/18/21     Will route request to MD for review.     Reviewed [place]  Report.

## 2021-07-28 RX ORDER — HYDROCODONE BITARTRATE AND ACETAMINOPHEN 10; 325 MG/1; MG/1
1-2 TABLET ORAL EVERY 4 HOURS PRN
Qty: 150 TABLET | Refills: 0 | Status: SHIPPED | OUTPATIENT
Start: 2021-07-30 | End: 2021-08-11

## 2021-07-29 NOTE — PROGRESS NOTES
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 Christensen). Path: SCC, non keratinizing, p16 +gardenia  3/23/21              CT neck. 2.9 x 2.7 x 3.5 cm L tonsil fullness involving soft palate, level 2-3 neck nodes  4/2/21                PET/CT. 2.7 x 2.2 x 2.7 cm L palatine tonsil mass (SUV 24), extension to oral cavity, 3.4 x 2.0 cm L level 4 node invading SCM, 1.8 x 1.8 cm L level 2A/3 node (SUV 7.9) w/ECS, 1.4 x 0.9 cm R level 2A node (SUV 6.7)  4/28~6/12/21     Chemoradiation with weekly cisplatin. No HD cisplatin due to CKD. Cisplatin held 6/2/21 due to malnutrition, SURESH  5/21/21              [facility] ED for fever to 102. No localizing source, cultures negative, not neutropenic  6/2~6/9/21         [facility] for malnutrition, inability to tolerate PO, SURESH. C/b respiratory arrest due to opioids, aspiration pneumonia, anxiety    He was seen today for oncology follow-up     Interval History:  -Doing okay, finally feels he may be turning a corner  -Frustrated we are recommended against removal of his PEG as it is psychologically interfering with his ability to eat.   -Has dry mouth, drinks lots of water  -Throat feels sore/burning  -Drinking 5-6 boost cartons daily, also eating eggs, soft toast in addition   -No cough or SOB, but wakes up at night with dry mouth which causes him to cough  -Wants to exercise more but his tube is preventing him  -Birthday is tomorrow  -Denies neuropathy or other residual side effects from chemotherapy     10 point review of systems otherwise negative     Current Outpatient Medications   Medication Sig Dispense Refill      amoxicillin-clavulanate (AUGMENTIN-ES) 600-42.9 MG/5ML suspension Take 5 mLs (600 mg) by mouth 2 times daily 200 mL 0     ASPIRIN ADULT LOW STRENGTH 81 MG EC tablet TAKE ONE TABLET BY MOUTH ONCE DAILY 90 tablet 1     cevimeline (EVOXAC) 30 MG capsule Take 1 capsule (30 mg) by mouth 3 times daily 120 capsule 11     citalopram (CELEXA) 40 MG tablet TAKE ONE TABLET BY MOUTH ONCE DAILY 30 tablet 8     dronabinol (MARINOL) 2.5 MG capsule Take 1 capsule (2.5 mg) by mouth 2 times daily (before meals) 28 capsule 3     HYDROcodone-acetaminophen (NORCO)  MG per tablet Take 1-2 tablets by mouth every 4 hours as needed for severe pain (. No more than 10 tabs in 24 hours.) 150 tablet 0     hydrOXYzine (VISTARIL) 25 MG capsule TAKE 1 CAPSULE (25 MG) BY MOUTH 4 TIMES DAILY AS NEEDED FOR ANXIETY 120 capsule 4     methadone (DOLOPHINE) 5 MG tablet Take 1 tablet (5 mg) by mouth 2 times daily For 2 weeks. Then 1/2 tab in morning and 1 tab in the evening. 28 tablet 0     metoprolol succinate ER (TOPROL-XL) 50 MG 24 hr tablet Take 1 tablet (50 mg) by mouth daily 90 tablet 3     naloxone (NARCAN) 4 MG/0.1ML nasal spray Spray 1 spray (4 mg) into one nostril alternating nostrils as needed for opioid reversal every 2-3 minutes until assistance arrives 0.2 mL      nitroGLYcerin (NITROSTAT) 0.4 MG sublingual tablet For chest pain place 1 tablet under the tongue every 5 minutes for 3 doses. If symptoms persist 5 minutes after 1st dose call 911. 25 tablet 0     omeprazole (PRILOSEC) 40 MG DR capsule Take 1 capsule (40 mg) by mouth daily 90 capsule 3     prochlorperazine (COMPAZINE) 5 MG tablet TAKE 1 TABLET BY MOUTH EVERY 6 HOURS AS NEEDED FOR NAUSEA OR VOMITING       rosuvastatin (CRESTOR) 40 MG tablet Take 1 tablet (40 mg) by mouth daily 90 tablet 0     testosterone (ANDROGEL 1.62 % PUMP) 20.25 MG/ACT gel testosterone 20.25 mg/1.25 gram (1.62 %) transdermal gel pump       zolpidem (AMBIEN) 10 MG tablet TAKE 1 TABLET BY MOUTH EVERY  EVENING AS NEEDED FOR SLEEP 30 tablet 0     Physical Examination:  BP (!) 154/88 (BP Location: Left arm, Patient Position: Sitting, Cuff Size: Adult Regular)   Pulse 72   Temp 97.6  F (36.4  C) (Oral)   Resp 16   Wt 89 kg (196 lb 1.6 oz)   SpO2 97%   BMI 29.38 kg/m    Wt Readings from Last 10 Encounters:   07/02/21 90.6 kg (199 lb 12.8 oz)   06/15/21 91.6 kg (202 lb)   06/10/21 93 kg (205 lb)   06/08/21 97.9 kg (215 lb 14.4 oz)   06/03/21 95.7 kg (211 lb)   06/02/21 94.7 kg (208 lb 11.2 oz)   06/01/21 94.8 kg (209 lb)   05/28/21 97.6 kg (215 lb 1.6 oz)   05/27/21 97.3 kg (214 lb 6.4 oz)   05/26/21 96.3 kg (212 lb 3.2 oz)     General: No acute distress; diaphoretic.   HEENT: Sclera anicteric. Oral mucosa pink; irritated mucosa L side of mouth. Thrush scattered on tongue and oropharnyx  Lymph: No lymphadenopathy in neck  Heart: Regular, rate, and rhythm  Lungs: Clear to ascultation bilaterally  Abdomen: Positive bowel sounds. Soft, non-distended, non-tender. PEG without local tenderness of erythema   Extremities: no lower extremity edema  Neuro: Cranial nerves grossly intact  Rash: none    Laboratory Data:  Results for MANUEL MONTOYA (MRN [id number]) as of 7/30/2021 15:20   Ref. Range 7/30/2021 10:10   Sodium Latest Ref Range: 133 - 144 mmol/L 137   Potassium Latest Ref Range: 3.4 - 5.3 mmol/L 3.9   Chloride Latest Ref Range: 94 - 109 mmol/L 101   Carbon Dioxide Latest Ref Range: 20 - 32 mmol/L 31   Urea Nitrogen Latest Ref Range: 7 - 30 mg/dL 20   Creatinine Latest Ref Range: 0.66 - 1.25 mg/dL 0.99   GFR Estimate Latest Ref Range: >60 mL/min/1.73m2 77   Calcium Latest Ref Range: 8.5 - 10.1 mg/dL 9.5   Anion Gap Latest Ref Range: 3 - 14 mmol/L 5   Magnesium Latest Ref Range: 1.6 - 2.3 mg/dL 2.0   Albumin Latest Ref Range: 3.4 - 5.0 g/dL 3.2 (L)   Protein Total Latest Ref Range: 6.8 - 8.8 g/dL 7.2   Bilirubin Total Latest Ref Range: 0.2 - 1.3 mg/dL 0.5   Alkaline Phosphatase Latest Ref Range: 40 - 150 U/L 72    ALT Latest Ref Range: 0 - 70 U/L 57   AST Latest Ref Range: 0 - 45 U/L 35   Glucose Latest Ref Range: 70 - 99 mg/dL 150 (H)   WBC Latest Ref Range: 4.0 - 11.0 10e3/uL 5.4   Hemoglobin Latest Ref Range: 13.3 - 17.7 g/dL 10.7 (L)   Hematocrit Latest Ref Range: 40.0 - 53.0 % 34.0 (L)   Platelet Count Latest Ref Range: 150 - 450 10e3/uL 222   RBC Count Latest Ref Range: 4.40 - 5.90 10e6/uL 3.64 (L)   MCV Latest Ref Range: 78 - 100 fL 93   MCH Latest Ref Range: 26.5 - 33.0 pg 29.4   MCHC Latest Ref Range: 31.5 - 36.5 g/dL 31.5   RDW Latest Ref Range: 10.0 - 15.0 % 16.3 (H)   % Neutrophils Latest Units: % 82   % Lymphocytes Latest Units: % 9   % Monocytes Latest Units: % 7   % Eosinophils Latest Units: % 1   Absolute Basophils Latest Ref Range: 0.0 - 0.2 10e3/uL 0.0   % Basophils Latest Units: % 0   Absolute Eosinophils Latest Ref Range: 0.0 - 0.7 10e3/uL 0.1   Absolute Immature Granulocytes Latest Ref Range: <=0.0 10e3/uL 0.1 (H)   Absolute Lymphocytes Latest Ref Range: 0.8 - 5.3 10e3/uL 0.5 (L)   Absolute Monocytes Latest Ref Range: 0.0 - 1.3 10e3/uL 0.4   % Immature Granulocytes Latest Units: % 1   Absolute Neutrophils Latest Ref Range: 1.6 - 8.3 10e3/uL 4.4   Absolute NRBCs Latest Units: 10e3/uL 0.0   NRBCs per 100 WBC Latest Ref Range: <1 /100 0         Assessment and Plan:  1. Onc  SCC L tonsil, [id number], p16 +gardenia, ECS +gardenia: Completed chemoradiation 6/12/21. Recovering as expected after treatment. This morning's labs reviewed and no concerning findings. Again reviewed with patient the expected timing of recovery. Unless acute events develop in the next 4 weeks, follow up with Dr. Schrader should suffice.   -CT and rad onc/ENT visit is this afternoon.   -PET is scheduled for September.    2. ENT  Dysphagia: Seeing speech today. He should continue exercises    Pain with acute on chronic opioid use, on methadone and Norco. He has tapering plan with Dr. Serrano; has follow up with him next week. Continue salt/soda  swishes as well    Phlegm/secretions: improving    Thrush: fluconazole x7 days. Pharmacy will review the QT risk with him and if New chooses, can change to nystatin, otherwise based on methadone dose fluconazole was felt to be safe by pharmacy.     Xerostomia: has tried cevimeline and another medication (unsure of name) without relief. Asked he discuss with ENT as I am less familiar with third line treatments. Otherwise encouraged good PO liquid intake  - again discussed dry mouth can be long term issue..     3. MURRAY  Stopped PEG feedings four days ago and now doing 5-6  Ensure/boost daily orally. He is doing some soft food as well but really not doing much for solids. He recently met with RD. I recommended he monitor his weight daily at home for a reliable trend.   -I reviewed the rationale for our recommendation to keep PEG tube until he can maintain weight on oral food alone for a period of time.     Hx CKD, creat stable    4. Cards  No active concerns, continue Metoprolol and Crestor. Will need PCP or cardsfollow-up for ASCVD in future.      60 minutes spent on the date of the encounter doing chart review, review of test results, interpretation of tests, patient visit, documentation and discussion with other provider(s)     Leisa Zarate CNP on 7/30/2021 at 3:34 PM

## 2021-07-30 ENCOUNTER — THERAPY VISIT (OUTPATIENT)
Dept: SPEECH THERAPY | Facility: CLINIC | Age: 71
End: 2021-07-30
Payer: MEDICARE

## 2021-07-30 ENCOUNTER — OFFICE VISIT (OUTPATIENT)
Dept: OTOLARYNGOLOGY | Facility: CLINIC | Age: 71
End: 2021-07-30
Payer: MEDICARE

## 2021-07-30 ENCOUNTER — LAB (OUTPATIENT)
Dept: LAB | Facility: CLINIC | Age: 71
End: 2021-07-30
Payer: MEDICARE

## 2021-07-30 ENCOUNTER — ANCILLARY PROCEDURE (OUTPATIENT)
Dept: CT IMAGING | Facility: CLINIC | Age: 71
End: 2021-07-30
Attending: OTOLARYNGOLOGY
Payer: MEDICARE

## 2021-07-30 ENCOUNTER — ONCOLOGY VISIT (OUTPATIENT)
Dept: ONCOLOGY | Facility: CLINIC | Age: 71
End: 2021-07-30
Attending: NURSE PRACTITIONER
Payer: MEDICARE

## 2021-07-30 VITALS — WEIGHT: 196.21 LBS | BODY MASS INDEX: 29.4 KG/M2

## 2021-07-30 VITALS
RESPIRATION RATE: 16 BRPM | BODY MASS INDEX: 29.38 KG/M2 | HEART RATE: 72 BPM | TEMPERATURE: 97.6 F | WEIGHT: 196.1 LBS | OXYGEN SATURATION: 97 % | SYSTOLIC BLOOD PRESSURE: 154 MMHG | DIASTOLIC BLOOD PRESSURE: 88 MMHG

## 2021-07-30 DIAGNOSIS — C09.9 SQUAMOUS CELL CARCINOMA OF TONSIL (H): ICD-10-CM

## 2021-07-30 DIAGNOSIS — C09.9 SQUAMOUS CELL CARCINOMA OF LEFT TONSIL (H): Primary | ICD-10-CM

## 2021-07-30 DIAGNOSIS — R13.12 OROPHARYNGEAL DYSPHAGIA: Primary | ICD-10-CM

## 2021-07-30 DIAGNOSIS — E83.42 HYPOMAGNESEMIA: ICD-10-CM

## 2021-07-30 DIAGNOSIS — C09.9 SQUAMOUS CELL CARCINOMA OF TONSIL (H): Primary | ICD-10-CM

## 2021-07-30 DIAGNOSIS — Z13.29 SCREENING FOR HYPOTHYROIDISM: ICD-10-CM

## 2021-07-30 DIAGNOSIS — C09.9 SQUAMOUS CELL CARCINOMA OF LEFT TONSIL (H): ICD-10-CM

## 2021-07-30 DIAGNOSIS — C61 MALIGNANT NEOPLASM OF PROSTATE (H): Primary | ICD-10-CM

## 2021-07-30 LAB
ALBUMIN SERPL-MCNC: 3.2 G/DL (ref 3.4–5)
ALP SERPL-CCNC: 72 U/L (ref 40–150)
ALT SERPL W P-5'-P-CCNC: 57 U/L (ref 0–70)
ANION GAP SERPL CALCULATED.3IONS-SCNC: 5 MMOL/L (ref 3–14)
AST SERPL W P-5'-P-CCNC: 35 U/L (ref 0–45)
BASOPHILS # BLD AUTO: 0 10E3/UL (ref 0–0.2)
BASOPHILS NFR BLD AUTO: 0 %
BILIRUB SERPL-MCNC: 0.5 MG/DL (ref 0.2–1.3)
BUN SERPL-MCNC: 20 MG/DL (ref 7–30)
CALCIUM SERPL-MCNC: 9.5 MG/DL (ref 8.5–10.1)
CHLORIDE BLD-SCNC: 101 MMOL/L (ref 94–109)
CO2 SERPL-SCNC: 31 MMOL/L (ref 20–32)
CREAT SERPL-MCNC: 0.99 MG/DL (ref 0.66–1.25)
EOSINOPHIL # BLD AUTO: 0.1 10E3/UL (ref 0–0.7)
EOSINOPHIL NFR BLD AUTO: 1 %
ERYTHROCYTE [DISTWIDTH] IN BLOOD BY AUTOMATED COUNT: 16.3 % (ref 10–15)
GFR SERPL CREATININE-BSD FRML MDRD: 77 ML/MIN/1.73M2
GLUCOSE BLD-MCNC: 150 MG/DL (ref 70–99)
HCT VFR BLD AUTO: 34 % (ref 40–53)
HGB BLD-MCNC: 10.7 G/DL (ref 13.3–17.7)
IMM GRANULOCYTES # BLD: 0.1 10E3/UL
IMM GRANULOCYTES NFR BLD: 1 %
LYMPHOCYTES # BLD AUTO: 0.5 10E3/UL (ref 0.8–5.3)
LYMPHOCYTES NFR BLD AUTO: 9 %
MAGNESIUM SERPL-MCNC: 2 MG/DL (ref 1.6–2.3)
MCH RBC QN AUTO: 29.4 PG (ref 26.5–33)
MCHC RBC AUTO-ENTMCNC: 31.5 G/DL (ref 31.5–36.5)
MCV RBC AUTO: 93 FL (ref 78–100)
MONOCYTES # BLD AUTO: 0.4 10E3/UL (ref 0–1.3)
MONOCYTES NFR BLD AUTO: 7 %
NEUTROPHILS # BLD AUTO: 4.4 10E3/UL (ref 1.6–8.3)
NEUTROPHILS NFR BLD AUTO: 82 %
NRBC # BLD AUTO: 0 10E3/UL
NRBC BLD AUTO-RTO: 0 /100
PLATELET # BLD AUTO: 222 10E3/UL (ref 150–450)
POTASSIUM BLD-SCNC: 3.9 MMOL/L (ref 3.4–5.3)
PROT SERPL-MCNC: 7.2 G/DL (ref 6.8–8.8)
RBC # BLD AUTO: 3.64 10E6/UL (ref 4.4–5.9)
SODIUM SERPL-SCNC: 137 MMOL/L (ref 133–144)
WBC # BLD AUTO: 5.4 10E3/UL (ref 4–11)

## 2021-07-30 PROCEDURE — 85025 COMPLETE CBC W/AUTO DIFF WBC: CPT

## 2021-07-30 PROCEDURE — 92526 ORAL FUNCTION THERAPY: CPT | Mod: GN | Performed by: SPEECH-LANGUAGE PATHOLOGIST

## 2021-07-30 PROCEDURE — 80053 COMPREHEN METABOLIC PANEL: CPT

## 2021-07-30 PROCEDURE — 99024 POSTOP FOLLOW-UP VISIT: CPT | Performed by: RADIOLOGY

## 2021-07-30 PROCEDURE — G1004 CDSM NDSC: HCPCS | Performed by: RADIOLOGY

## 2021-07-30 PROCEDURE — 99214 OFFICE O/P EST MOD 30 MIN: CPT | Mod: 25 | Performed by: OTOLARYNGOLOGY

## 2021-07-30 PROCEDURE — 83735 ASSAY OF MAGNESIUM: CPT

## 2021-07-30 PROCEDURE — 36415 COLL VENOUS BLD VENIPUNCTURE: CPT

## 2021-07-30 PROCEDURE — 99215 OFFICE O/P EST HI 40 MIN: CPT | Performed by: NURSE PRACTITIONER

## 2021-07-30 PROCEDURE — 31575 DIAGNOSTIC LARYNGOSCOPY: CPT | Performed by: OTOLARYNGOLOGY

## 2021-07-30 PROCEDURE — 70491 CT SOFT TISSUE NECK W/DYE: CPT | Mod: MG | Performed by: RADIOLOGY

## 2021-07-30 RX ORDER — FLUCONAZOLE 200 MG/1
200 TABLET ORAL DAILY
Qty: 7 TABLET | Refills: 0 | Status: SHIPPED | OUTPATIENT
Start: 2021-07-30 | End: 2021-07-30

## 2021-07-30 RX ORDER — PROCHLORPERAZINE MALEATE 5 MG
TABLET ORAL
COMMUNITY
Start: 2021-07-28 | End: 2022-02-10

## 2021-07-30 RX ORDER — IOPAMIDOL 755 MG/ML
100 INJECTION, SOLUTION INTRAVASCULAR ONCE
Status: COMPLETED | OUTPATIENT
Start: 2021-07-30 | End: 2021-07-30

## 2021-07-30 RX ORDER — FLUCONAZOLE 200 MG/1
200 TABLET ORAL DAILY
Qty: 7 TABLET | Refills: 0 | Status: SHIPPED | OUTPATIENT
Start: 2021-07-30 | End: 2021-08-13

## 2021-07-30 RX ADMIN — IOPAMIDOL 100 ML: 755 INJECTION, SOLUTION INTRAVASCULAR at 15:34

## 2021-07-30 ASSESSMENT — PAIN SCALES - GENERAL
PAINLEVEL: EXTREME PAIN (8)
PAINLEVEL: MODERATE PAIN (4)

## 2021-07-30 NOTE — PROGRESS NOTES
Rehabilitation Services      OUTPATIENT SPEECH LANGUAGE PATHOLOGY  PLAN OF TREATMENT FOR OUTPATIENT REHABILITATION    Patient's Last Name, First Name, M.I.                         YOB: 1950  Quan Murphy     Provider's Name   Yoselin Wendymisbah, SLP    Medical Record No.  [id number]      Start of Care Date:  04/27/21    Onset Date:  04/27/21   Type:     ___PT   ____OT  ___X_SLP Medical Diagnosis:  Oropharyngeal dysphagia      Treatment Diagnosis:  Mild oropharyngeal dysphagia expected to worsen to moderately severe during XRT        _________________________________________________________________________________  Plan of Treatment:    Frequency/Duration: 1x/month in conjunction with ENT clinic visits     Goals:  Goal Identifier Diet   Goal Description 1. Pt will tolerate regular textures and thin liquids with no overt clinical s/sx of penetration/aspiration in 100% of PO trials.    Target Date 10/24/21   Date Met      Progress (detail required for progress note): Goal not met. Pt just recently stopped using his feeding tube and is only taking liquids and some purees by mouth currently. Reports xerostomia, dysgeusia and lack of appetite are all barriers to greater PO intake in regards to frequency and variety. He will benefit from ongoing SLP services to ensure tolerance of regular textures and thin liquids.      Goal Identifier Exercises   Goal Description 2. Pt will complete 10 reps of 5/5 oropharyngeal and jaw strengthening/ROM exercises with minimal written and/or verbal cues.    Target Date 10/24/21   Date Met      Progress (detail required for progress note): Goal not met. Pt is intermittently completing exercises, but not at prescribed frequency. He will benefit from ongoing SLP services to facilitate exercise completion given risk of XRT induced fibrosis, and subsequent risk of progressive dysphagia which could  lead to G tube dependence.          Certification date from 07/26/21 to 10/24/21.    Yoselin Duong, SLP          I CERTIFY THE NEED FOR THESE SERVICES FURNISHED UNDER        THIS PLAN OF TREATMENT AND WHILE UNDER MY CARE     (Physician attestation of this document indicates review and certification of the therapy plan).                Referring Provider: Dr. Ligia Fang

## 2021-07-30 NOTE — LETTER
7/30/2021       RE: Quan Murphy  [address] [facility] [address]     Dear Colleague,    Thank you for referring your patient, Quan Murphy, to the [facility] EAR NOSE AND THROAT CLINIC [place] at [facility]. Please see a copy of my visit note below.    Dear Dr. Christensen:    I had the pleasure of seeing Quan Murphy in follow-up today at the [facility].     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 The patient has a history of foreign body sensation on the left side starting a few months ago. He tried gargling and using a neti pot with no improvement. He also developed left neck discomfort which radiates into his head. He saw primary care on 3/4/2021. He was treated with a course of antibiotics with no improvement. He saw seen by Dr Christensen on 3/22/2021. At that time he had an enlarged left tonsil. A biopsy was performed at that time which was consistent with a p16+ SCC. He had a CT scan on 3/23/2021 which showed a 2.9 x 2.7 x 3.5 cm left tonsil cancer, involving the soft palate, left level II and III lymphadenopathy. He had a PET scan performed today which showed the primary tumor in the tonsil and soft palate, left-sided lymphadenopathy with SHAYNE present (3.4 x 2.0 cm, 1.8 x 1.8 cm), right-sided node (1.4 x 0.9 cm) with FDG activity, no distant disease.      Interval history:  He comes in today for follow-up. He was last seen as a new patient in April 2021. He was treated with definitive chemoradiation from 4/28/2021 to 6/12/2021. He had a total of 6996 cGy under the care of Dr Robbins. He had weekly cisplatin for a total of 5 cycles under the care of Dr Schrader. He had a difficult time with pain control during his treatment secondary to his chronic pain medication use and pain medications were managed by palliative care. He was hospitalized from  6/2/2021 to 6/9/2021 for failure to thrive. He had reactive PEG tube placement on 6/7/2021 due to inability to maintain adequate nutrition and intolerance of the idea of an NG tube.  He says that things are slowly improving since he completed his treatment.  He continues to struggle with dry mouth.  He is taking about 5-6 shakes/Ensure per day.  He last used his PEG tube about 4 to 5 days ago.  He is taking all of his medications by mouth.  He says that he is having more pain at night, some pain during the daytime.  He is taking Vicodin and methadone.  He is doing the salt and soda rinses.  He is not using any Biotene products.  He is taking the cevimeline for dry mouth.  He has no ear pain.  He feels like his hearing is muffled.  He is going to the gym every other day.  He feels like his breathing is okay with the exception of waking at night feeling short of breath related to inability to breathe through his nose from dry mouth.  He continues to follow with palliative care for his pain control.      MEDICATIONS:     Current Outpatient Medications   Medication Sig Dispense Refill     amoxicillin-clavulanate (AUGMENTIN-ES) 600-42.9 MG/5ML suspension Take 5 mLs (600 mg) by mouth 2 times daily 200 mL 0     ASPIRIN ADULT LOW STRENGTH 81 MG EC tablet TAKE ONE TABLET BY MOUTH ONCE DAILY 90 tablet 1     cevimeline (EVOXAC) 30 MG capsule Take 1 capsule (30 mg) by mouth 3 times daily 120 capsule 11     citalopram (CELEXA) 40 MG tablet TAKE ONE TABLET BY MOUTH ONCE DAILY 30 tablet 8     dronabinol (MARINOL) 2.5 MG capsule Take 1 capsule (2.5 mg) by mouth 2 times daily (before meals) 28 capsule 3     fluconazole (DIFLUCAN) 200 MG tablet Take 1 tablet (200 mg) by mouth daily 7 tablet 0     HYDROcodone-acetaminophen (NORCO)  MG per tablet Take 1-2 tablets by mouth every 4 hours as needed for severe pain (. No more than 10 tabs in 24 hours.) 150 tablet 0     hydrOXYzine (VISTARIL) 25 MG capsule TAKE 1 CAPSULE (25 MG) BY  MOUTH 4 TIMES DAILY AS NEEDED FOR ANXIETY 120 capsule 4     methadone (DOLOPHINE) 5 MG tablet Take 1 tablet (5 mg) by mouth 2 times daily For 2 weeks. Then 1/2 tab in morning and 1 tab in the evening. 28 tablet 0     metoprolol succinate ER (TOPROL-XL) 50 MG 24 hr tablet Take 1 tablet (50 mg) by mouth daily 90 tablet 3     naloxone (NARCAN) 4 MG/0.1ML nasal spray Spray 1 spray (4 mg) into one nostril alternating nostrils as needed for opioid reversal every 2-3 minutes until assistance arrives 0.2 mL      nitroGLYcerin (NITROSTAT) 0.4 MG sublingual tablet For chest pain place 1 tablet under the tongue every 5 minutes for 3 doses. If symptoms persist 5 minutes after 1st dose call 911. 25 tablet 0     omeprazole (PRILOSEC) 40 MG DR capsule Take 1 capsule (40 mg) by mouth daily 90 capsule 3     prochlorperazine (COMPAZINE) 5 MG tablet TAKE 1 TABLET BY MOUTH EVERY 6 HOURS AS NEEDED FOR NAUSEA OR VOMITING       rosuvastatin (CRESTOR) 40 MG tablet Take 1 tablet (40 mg) by mouth daily 90 tablet 0     testosterone (ANDROGEL 1.62 % PUMP) 20.25 MG/ACT gel testosterone 20.25 mg/1.25 gram (1.62 %) transdermal gel pump       zolpidem (AMBIEN) 10 MG tablet TAKE 1 TABLET BY MOUTH EVERY EVENING AS NEEDED FOR SLEEP 30 tablet 0       ALLERGIES:    Allergies   Allergen Reactions     Animal Dander      Azithromycin Nausea and Vomiting     Dust Mites      Pollen Extract      Smoke.        HABITS/SOCIAL HISTORY:   Nonsmoker. Chewing tobacco use occasionally when fishing. . Drink 2-3 beers per day.   Lives with wife (nurse). Retired .     Social History     Socioeconomic History     Marital status:      Spouse name: Alessandra     Number of children: 2     Years of education: Not on file     Highest education level: Not on file   Occupational History     Occupation: Retired   Tobacco Use     Smoking status: Never Smoker     Smokeless tobacco: Never Used   Substance and Sexual Activity     Alcohol use: Yes      Alcohol/week: 8.3 standard drinks     Types: 10 Cans of beer per week     Comment: 2 beers a day      Drug use: No     Sexual activity: Yes     Partners: Female     Birth control/protection: Post-menopausal, Female Surgical   Other Topics Concern      Service Not Asked     Blood Transfusions Not Asked     Caffeine Concern Not Asked     Occupational Exposure Not Asked     Hobby Hazards Not Asked     Sleep Concern Not Asked     Stress Concern Not Asked     Weight Concern Not Asked     Special Diet Not Asked     Back Care Not Asked     Exercise Not Asked     Bike Helmet Not Asked     Seat Belt Not Asked     Self-Exams Not Asked     Parent/sibling w/ CABG, MI or angioplasty before 65F 55M? No   Social History Narrative     Not on file     Social Determinants of Health     Financial Resource Strain:      Difficulty of Paying Living Expenses:    Food Insecurity:      Worried About Running Out of Food in the Last Year:      Ran Out of Food in the Last Year:    Transportation Needs:      Lack of Transportation (Medical):      Lack of Transportation (Non-Medical):    Physical Activity:      Days of Exercise per Week:      Minutes of Exercise per Session:    Stress:      Feeling of Stress :    Social Connections:      Frequency of Communication with Friends and Family:      Frequency of Social Gatherings with Friends and Family:      Attends [employer] Services:      Active Member of Clubs or Organizations:      Attends Club or Organization Meetings:      Marital Status:    Intimate Partner Violence:      Fear of Current or Ex-Partner:      Emotionally Abused:      Physically Abused:      Sexually Abused:        PAST MEDICAL HISTORY:   Past Medical History:   Diagnosis Date     Allergic rhinitis      Allergy, unspecified not elsewhere classified     Seasonal allergies, pollen, dust, smoke and animals     Antiplatelet or antithrombotic long-term use      Anxiety      Arthritis      Chest pain      Chronic sinusitis       Coronary atherosclerosis of unspecified type of vessel, native or graft     Coronary artery disease     Depressive disorder 1995     Gastroesophageal reflux disease 2020    Cleared with medication     Head injury 1954     Hiatal hernia 2015    Right Side     History of blood transfusion 12/15/2004    Prostate Surgery - My own blood     Hyperlipidemia      Hypertension      Inguinal hernia      Kidney problem 10/08/2017    Lithotripsy     Kidney stones      Malignant neoplasm of prostate (H)     Prostate cancer     Prostate cancer (H)         PAST SURGICAL HISTORY:   Past Surgical History:   Procedure Laterality Date     ARTHRODESIS FOOT  7/23/2013    Procedure: ARTHRODESIS FOOT;  Great Toe Arthrodesis Left Foot;  Surgeon: Ash Gonzalez DPM;  Location: [facility] OR     ARTHRODESIS FOOT  6/10/2014    Procedure: ARTHRODESIS FOOT;  Surgeon: Ash Gonzalez DPM;  Location: [facility] OR     BIOPSY  10/1/2004    dermatologist biopsies     C LAPAROSCOPY, SURGICAL PROSTATECTOMY, RETROPUBIC RADICAL, W/NERVE SPARING  11/30/2004    With full bilateral pelvic lymphadenectomy.  [facility].     C TOTAL KNEE ARTHROPLASTY  05/01/08    Left knee     COLONOSCOPY  10/7/2013    Procedure: COLONOSCOPY;  Colonoscopy;  Surgeon: Mike Fallon MD;  Location:  GI     ESOPHAGOSCOPY, GASTROSCOPY, DUODENOSCOPY (EGD), COMBINED N/A 2/12/2020    Procedure: ESOPHAGOGASTRODUODENOSCOPY (EGD);  Surgeon: Sam Escobar MD;  Location:  GI     EXTRACORPOREAL SHOCK WAVE LITHOTRIPSY (ESWL) Bilateral 10/18/2017    Procedure: EXTRACORPOREAL SHOCK WAVE LITHOTRIPSY (ESWL);  BILATERAL EXTRACORPOREAL SHOCKWAVE LITHOTRIPSY ;  Surgeon: Meir Torres MD;  Location: [facility] OR     HC CORRECT BUNION,SIMPLE  08/11/2005    x3     HC REMV TOE BENIGN BONE LESN  08/11/2005     HEAD & NECK SURGERY  1954    From a fall     HERNIORRHAPHY INGUINAL  7/3/2013    Procedure: HERNIORRHAPHY INGUINAL;  Open Repair Inguinal hernia Right with mesh ;  Surgeon: Sam Escobar MD;   Location: [facility] OR     IR GASTROSTOMY TUBE PERCUTANEOUS PLCMNT  6/7/2021     MOHS MICROGRAPHIC PROCEDURE  08/23/11    ear and chin-[facility]     OPEN REDUCTION INTERNAL FIXATION WRIST Right 7/18/2017    Procedure: OPEN REDUCTION INTERNAL FIXATION WRIST;  Right distal radius open reduction and internal fixation;  Surgeon: Pedro Blanca DO;  Location: [facility] OR     RECONSTRUCT FOREFOOT WITH METATARSOPHALANGEAL (MTP) FUSION  6/10/2014    Procedure: RECONSTRUCT FOREFOOT WITH METATARSOPHALANGEAL (MTP) FUSION;  Surgeon: Ash Gonzalez DPM;  Location: [facility] OR     STENT, CORONARY, DEMI       SURGICAL HISTORY OF -   1999/1974    lt knee     SURGICAL HISTORY OF -   10/2004    lithotripsy     SURGICAL HISTORY OF -   11/05    angiogram with stent     VASCULAR SURGERY  11/17/2005    Puncture of iliac artery during and angiogram       FAMILY HISTORY:    Family History   Problem Relation Age of Onset     Hypertension Father         Lived to age 87     Connective Tissue Disorder Mother         LUPUS     Heart Disease Mother         Valve replacement     Anxiety Disorder Mother         Lived to age 84     Dementia Mother         Nursing Home (lived to age 86)       REVIEW OF SYSTEMS:  12 point ROS was negative other than the symptoms noted above in the HPI.  Patient Supplied Answers to Review of Systems  [facility] ENT ROS 7/25/2021   Constitutional Problems with sleep   Neurology -   Psychology -   Ears, Nose, Throat Nasal congestion or drainage, Sore throat, Trouble swallowing   Allergy/Immunology Allergies or hay fever   Hematologic -         PHYSICAL EXAMINATION:   Wt 89 kg (196 lb 3.4 oz)   BMI 29.40 kg/m     Appearance:   normal; NAD, age-appropriate appearance, well-developed, normal habitus   Communication:   normal; communicates verbally, normal voice quality, intermittent stutter   Head/Face:   inspection -  Normal; no scars or visible lesions   Salivary glands -  Normal size, no tenderness, swelling, or  palpable masses   Facial strength -  Normal and symmetric    Skin:  normal, no rash   Ears:  auricle (AD) -  normal  EAC (AD) -  normal  TM (AD) -  Normal, no effusion  auricle (AS) -  normal  EAC (AS) -  normal  TM (AS) -  Serous effusion  Normal clinical speech reception   Nose:  Ext. inspection -  Normal  Internal Inspection -  Normal mucosa, septum, and turbinates   Nasopharynx:  normal mucosa, no masses   Oral Cavity:  lips -  Normal mucosa, oral competence, and stoma size   Age-appropriate dentition, healthy gingival mucosa   Hard palate, buccal, floor of mouth mucosa with xerostomia   Tongue - normal movement, thrush along dorsal and ventral surface, xerostomia, mucositis along lateral tongue   Oropharynx:  There are radiation changes to the mucosa throughout.  There is mucositis along the entire oropharynx both anteriorly and posteriorly on scope exam.  This extends along the soft palate.  It extends along the base of tongue the left side.  There is no obvious residual masses.  The pharyngeal exam is slightly limited due to the patient's gag reflex.   Hypopharynx:  Normal pyriform sinus   Posterior pharyngeal wall with mucositis present   No pooled secretions    Larynx:  Epiglottis, AE folds, false vocal cords, true vocal cords normal in appearance, bilaterally mobile cords   The left arytenoid and inferior AE fold have expected radiation edema that is not obstructive   Neck: No visible mass or asymmetry   thyroid -  Normal   Normal range of motion   Lymphatic:  No palpable masses   Cardiovascular:  warm, pink, well-perfused extremities without swelling, tenderness, or edema   Respiratory:  Normal respiratory effort, no stridor   Neuro/Psych.:  mood/affect -  normal  mental status -  normal         PROCEDURES:   Flexible fiberoptic laryngoscopy: Scope exam was indicated due to tonsil cancer. Verbal consent was obtained. The nasal cavity was prepped with an aerosolized solution of topical anesthetic and  vasoconstrictive agent. The scope was passed through the anterior nasal cavity and advanced. Inspection of the nasopharynx revealed no gross abnormality.  There is mucositis along the posterior pharyngeal wall and extending into the lateral pharyngeal wall and left base of tongue.  There is no obvious residual mass in the left tonsil/oropharynx.  The epiglottis has very mild thickening.  There is radiation edema of the inferior AE fold on the left side extending to the left arytenoid.  This is partially overlying the glottis but is not obstructive.  The vocal cords are mobile bilaterally.  The piriform sinuses are clear. The airway is patent. Procedure tolerated well with no immediate complications noted.                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 He completed definitive chemoradiation on 6/12/2021.    He is recovering from the side effects of his treatment.  He has residual mucositis in his oropharynx which I anticipate will improve.  He should continue with the salt and soda rinses to help with this.  We discussed using a humidifier to help with the dryness.  He does have thrush on exam.  It sounds like he was given a prescription for nystatin earlier today.    He was seen by SLP again today. We spent considerable time counseling him about our recommendations for the PEG to stay in place.  The patient received a consistent message from myself, Dr. Robbins, SLP team that he needs to continue to not use his PEG tube for more than just a few days, ideally for a month, and maintain his weight prior to removal.  We would like to wait until after his PET scan is negative before we proceed with removal, which is about a month from now.  He was understandably disappointed by  this news but we did explain the reasoning for decision making.    He has a lab visit on the same day as his PET scan already scheduled, TSH ordered by Dr Schrader.     We will then see him back in 12 weeks post treatment in multi D clinic with his PET scan and labs. His scan/labs and follow-up visit are already scheduled.    Thank you very much for the opportunity to participate in the care of your patient.      Ligia Fang MD, M.D.  Otolaryngology- Head & Neck Surgery    This note was dictated with voice recognition software and then edited. Please excuse any unintentional errors.     CC:  Ahmet Robbins MD  Department of Radiation Oncology  [facility]    Too Christensen MD  [address] [place] [address]

## 2021-07-30 NOTE — NURSING NOTE
"Oncology Rooming Note    July 30, 2021 2:37 PM   Quan Murphy is a 70 year old male who presents for:    No chief complaint on file.    Initial Vitals: There were no vitals taken for this visit. Estimated body mass index is 29.94 kg/m  as calculated from the following:    Height as of 5/26/21: 1.74 m (5' 8.5\").    Weight as of 7/2/21: 90.6 kg (199 lb 12.8 oz). There is no height or weight on file to calculate BSA.  Data Unavailable Comment: Data Unavailable   No LMP for male patient.  Allergies reviewed: Yes  Medications reviewed: Yes    Medications: Medication refills not needed today.  Pharmacy name entered into Scalix:    [facility] [address] [facility] - [place], [place] - [address]  [facility] [place], [place] - [address] [place] [address] [phone]  [facility] [place], [place] - [address]    Clinical concerns: New concerns: Can the tube come out today? Ongoing sore throat, cotton mouth, nausea since radiation.        Angela Stevenson LPN July 30, 2021 2:38 PM                  "

## 2021-07-30 NOTE — PROGRESS NOTES
Dear Dr. Christensen:    I had the pleasure of seeing Quan Murphy in follow-up today at the [facility].     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 The patient has a history of foreign body sensation on the left side starting a few months ago. He tried gargling and using a neti pot with no improvement. He also developed left neck discomfort which radiates into his head. He saw primary care on 3/4/2021. He was treated with a course of antibiotics with no improvement. He saw seen by Dr Christensen on 3/22/2021. At that time he had an enlarged left tonsil. A biopsy was performed at that time which was consistent with a p16+ SCC. He had a CT scan on 3/23/2021 which showed a 2.9 x 2.7 x 3.5 cm left tonsil cancer, involving the soft palate, left level II and III lymphadenopathy. He had a PET scan performed today which showed the primary tumor in the tonsil and soft palate, left-sided lymphadenopathy with SHAYNE present (3.4 x 2.0 cm, 1.8 x 1.8 cm), right-sided node (1.4 x 0.9 cm) with FDG activity, no distant disease.      Interval history:  He comes in today for follow-up. He was last seen as a new patient in April 2021. He was treated with definitive chemoradiation from 4/28/2021 to 6/12/2021. He had a total of 6996 cGy under the care of Dr Robbins. He had weekly cisplatin for a total of 5 cycles under the care of Dr Schrader. He had a difficult time with pain control during his treatment secondary to his chronic pain medication use and pain medications were managed by palliative care. He was hospitalized from 6/2/2021 to 6/9/2021 for failure to thrive. He had reactive PEG tube placement on 6/7/2021 due to inability to maintain adequate nutrition and intolerance of the idea of an NG tube.  He says that things are slowly improving since he completed his treatment.  He continues to struggle with dry mouth.  He is taking about  5-6 shakes/Ensure per day.  He last used his PEG tube about 4 to 5 days ago.  He is taking all of his medications by mouth.  He says that he is having more pain at night, some pain during the daytime.  He is taking Vicodin and methadone.  He is doing the salt and soda rinses.  He is not using any Biotene products.  He is taking the cevimeline for dry mouth.  He has no ear pain.  He feels like his hearing is muffled.  He is going to the gym every other day.  He feels like his breathing is okay with the exception of waking at night feeling short of breath related to inability to breathe through his nose from dry mouth.  He continues to follow with palliative care for his pain control.      MEDICATIONS:     Current Outpatient Medications   Medication Sig Dispense Refill     amoxicillin-clavulanate (AUGMENTIN-ES) 600-42.9 MG/5ML suspension Take 5 mLs (600 mg) by mouth 2 times daily 200 mL 0     ASPIRIN ADULT LOW STRENGTH 81 MG EC tablet TAKE ONE TABLET BY MOUTH ONCE DAILY 90 tablet 1     cevimeline (EVOXAC) 30 MG capsule Take 1 capsule (30 mg) by mouth 3 times daily 120 capsule 11     citalopram (CELEXA) 40 MG tablet TAKE ONE TABLET BY MOUTH ONCE DAILY 30 tablet 8     dronabinol (MARINOL) 2.5 MG capsule Take 1 capsule (2.5 mg) by mouth 2 times daily (before meals) 28 capsule 3     fluconazole (DIFLUCAN) 200 MG tablet Take 1 tablet (200 mg) by mouth daily 7 tablet 0     HYDROcodone-acetaminophen (NORCO)  MG per tablet Take 1-2 tablets by mouth every 4 hours as needed for severe pain (. No more than 10 tabs in 24 hours.) 150 tablet 0     hydrOXYzine (VISTARIL) 25 MG capsule TAKE 1 CAPSULE (25 MG) BY MOUTH 4 TIMES DAILY AS NEEDED FOR ANXIETY 120 capsule 4     methadone (DOLOPHINE) 5 MG tablet Take 1 tablet (5 mg) by mouth 2 times daily For 2 weeks. Then 1/2 tab in morning and 1 tab in the evening. 28 tablet 0     metoprolol succinate ER (TOPROL-XL) 50 MG 24 hr tablet Take 1 tablet (50 mg) by mouth daily 90 tablet 3      naloxone (NARCAN) 4 MG/0.1ML nasal spray Spray 1 spray (4 mg) into one nostril alternating nostrils as needed for opioid reversal every 2-3 minutes until assistance arrives 0.2 mL      nitroGLYcerin (NITROSTAT) 0.4 MG sublingual tablet For chest pain place 1 tablet under the tongue every 5 minutes for 3 doses. If symptoms persist 5 minutes after 1st dose call 911. 25 tablet 0     omeprazole (PRILOSEC) 40 MG DR capsule Take 1 capsule (40 mg) by mouth daily 90 capsule 3     prochlorperazine (COMPAZINE) 5 MG tablet TAKE 1 TABLET BY MOUTH EVERY 6 HOURS AS NEEDED FOR NAUSEA OR VOMITING       rosuvastatin (CRESTOR) 40 MG tablet Take 1 tablet (40 mg) by mouth daily 90 tablet 0     testosterone (ANDROGEL 1.62 % PUMP) 20.25 MG/ACT gel testosterone 20.25 mg/1.25 gram (1.62 %) transdermal gel pump       zolpidem (AMBIEN) 10 MG tablet TAKE 1 TABLET BY MOUTH EVERY EVENING AS NEEDED FOR SLEEP 30 tablet 0       ALLERGIES:    Allergies   Allergen Reactions     Animal Dander      Azithromycin Nausea and Vomiting     Dust Mites      Pollen Extract      Smoke.        HABITS/SOCIAL HISTORY:   Nonsmoker. Chewing tobacco use occasionally when fishing. . Drink 2-3 beers per day.   Lives with wife (nurse). Retired .     Social History     Socioeconomic History     Marital status:      Spouse name: Alessandra     Number of children: 2     Years of education: Not on file     Highest education level: Not on file   Occupational History     Occupation: Retired   Tobacco Use     Smoking status: Never Smoker     Smokeless tobacco: Never Used   Substance and Sexual Activity     Alcohol use: Yes     Alcohol/week: 8.3 standard drinks     Types: 10 Cans of beer per week     Comment: 2 beers a day      Drug use: No     Sexual activity: Yes     Partners: Female     Birth control/protection: Post-menopausal, Female Surgical   Other Topics Concern      Service Not Asked     Blood Transfusions Not Asked     Caffeine  Concern Not Asked     Occupational Exposure Not Asked     Hobby Hazards Not Asked     Sleep Concern Not Asked     Stress Concern Not Asked     Weight Concern Not Asked     Special Diet Not Asked     Back Care Not Asked     Exercise Not Asked     Bike Helmet Not Asked     Seat Belt Not Asked     Self-Exams Not Asked     Parent/sibling w/ CABG, MI or angioplasty before 65F 55M? No   Social History Narrative     Not on file     Social Determinants of Health     Financial Resource Strain:      Difficulty of Paying Living Expenses:    Food Insecurity:      Worried About Running Out of Food in the Last Year:      Ran Out of Food in the Last Year:    Transportation Needs:      Lack of Transportation (Medical):      Lack of Transportation (Non-Medical):    Physical Activity:      Days of Exercise per Week:      Minutes of Exercise per Session:    Stress:      Feeling of Stress :    Social Connections:      Frequency of Communication with Friends and Family:      Frequency of Social Gatherings with Friends and Family:      Attends Mandaeism Services:      Active Member of Clubs or Organizations:      Attends Club or Organization Meetings:      Marital Status:    Intimate Partner Violence:      Fear of Current or Ex-Partner:      Emotionally Abused:      Physically Abused:      Sexually Abused:        PAST MEDICAL HISTORY:   Past Medical History:   Diagnosis Date     Allergic rhinitis      Allergy, unspecified not elsewhere classified     Seasonal allergies, pollen, dust, smoke and animals     Antiplatelet or antithrombotic long-term use      Anxiety      Arthritis      Chest pain      Chronic sinusitis      Coronary atherosclerosis of unspecified type of vessel, native or graft     Coronary artery disease     Depressive disorder 1995     Gastroesophageal reflux disease 2020    Cleared with medication     Head injury 1954     Hiatal hernia 2015    Right Side     History of blood transfusion 12/15/2004    Prostate Surgery - My  own blood     Hyperlipidemia      Hypertension      Inguinal hernia      Kidney problem 10/08/2017    Lithotripsy     Kidney stones      Malignant neoplasm of prostate (H)     Prostate cancer     Prostate cancer (H)         PAST SURGICAL HISTORY:   Past Surgical History:   Procedure Laterality Date     ARTHRODESIS FOOT  7/23/2013    Procedure: ARTHRODESIS FOOT;  Great Toe Arthrodesis Left Foot;  Surgeon: Ash Gonzalez DPM;  Location: [facility] OR     ARTHRODESIS FOOT  6/10/2014    Procedure: ARTHRODESIS FOOT;  Surgeon: Ash Gonzalez DPM;  Location: [facility] OR     BIOPSY  10/1/2004    dermatologist biopsies     C LAPAROSCOPY, SURGICAL PROSTATECTOMY, RETROPUBIC RADICAL, W/NERVE SPARING  11/30/2004    With full bilateral pelvic lymphadenectomy.  [facility].     C TOTAL KNEE ARTHROPLASTY  05/01/08    Left knee     COLONOSCOPY  10/7/2013    Procedure: COLONOSCOPY;  Colonoscopy;  Surgeon: Mike Fallon MD;  Location: [facility]     ESOPHAGOSCOPY, GASTROSCOPY, DUODENOSCOPY (EGD), COMBINED N/A 2/12/2020    Procedure: ESOPHAGOGASTRODUODENOSCOPY (EGD);  Surgeon: Sam Escobar MD;  Location: [facility]     EXTRACORPOREAL SHOCK WAVE LITHOTRIPSY (ESWL) Bilateral 10/18/2017    Procedure: EXTRACORPOREAL SHOCK WAVE LITHOTRIPSY (ESWL);  BILATERAL EXTRACORPOREAL SHOCKWAVE LITHOTRIPSY ;  Surgeon: Meir Torres MD;  Location: [facility] OR     HC CORRECT BUNION,SIMPLE  08/11/2005    x3     HC REMV TOE BENIGN BONE LESN  08/11/2005     HEAD & NECK SURGERY  1954    From a fall     HERNIORRHAPHY INGUINAL  7/3/2013    Procedure: HERNIORRHAPHY INGUINAL;  Open Repair Inguinal hernia Right with mesh ;  Surgeon: Sam Escobar MD;  Location: [facility]     IR GASTROSTOMY TUBE PERCUTANEOUS PLCMNT  6/7/2021     MOHS MICROGRAPHIC PROCEDURE  08/23/11    ear and chin-[facility]     OPEN REDUCTION INTERNAL FIXATION WRIST Right 7/18/2017    Procedure: OPEN REDUCTION INTERNAL FIXATION WRIST;  Right distal radius open reduction and internal fixation;   Surgeon: Pedro Blanca DO;  Location: [facility]     RECONSTRUCT FOREFOOT WITH METATARSOPHALANGEAL (MTP) FUSION  6/10/2014    Procedure: RECONSTRUCT FOREFOOT WITH METATARSOPHALANGEAL (MTP) FUSION;  Surgeon: Ash Gonzalez DPM;  Location: [facility]     STENT, CORONARY, DEMI       SURGICAL HISTORY OF -   1999/1974    lt knee     SURGICAL HISTORY OF -   10/2004    lithotripsy     SURGICAL HISTORY OF -   11/05    angiogram with stent     VASCULAR SURGERY  11/17/2005    Puncture of iliac artery during and angiogram       FAMILY HISTORY:    Family History   Problem Relation Age of Onset     Hypertension Father         Lived to age 87     Connective Tissue Disorder Mother         LUPUS     Heart Disease Mother         Valve replacement     Anxiety Disorder Mother         Lived to age 84     Dementia Mother         Nursing Home (lived to age 86)       REVIEW OF SYSTEMS:  12 point ROS was negative other than the symptoms noted above in the HPI.  Patient Supplied Answers to Review of Systems  [facility] ENT ROS 7/25/2021   Constitutional Problems with sleep   Neurology -   Psychology -   Ears, Nose, Throat Nasal congestion or drainage, Sore throat, Trouble swallowing   Allergy/Immunology Allergies or hay fever   Hematologic -         PHYSICAL EXAMINATION:   Wt 89 kg (196 lb 3.4 oz)   BMI 29.40 kg/m     Appearance:   normal; NAD, age-appropriate appearance, well-developed, normal habitus   Communication:   normal; communicates verbally, normal voice quality, intermittent stutter   Head/Face:   inspection -  Normal; no scars or visible lesions   Salivary glands -  Normal size, no tenderness, swelling, or palpable masses   Facial strength -  Normal and symmetric    Skin:  normal, no rash   Ears:  auricle (AD) -  normal  EAC (AD) -  normal  TM (AD) -  Normal, no effusion  auricle (AS) -  normal  EAC (AS) -  normal  TM (AS) -  Serous effusion  Normal clinical speech reception   Nose:  Ext. inspection -  Normal  Internal  Inspection -  Normal mucosa, septum, and turbinates   Nasopharynx:  normal mucosa, no masses   Oral Cavity:  lips -  Normal mucosa, oral competence, and stoma size   Age-appropriate dentition, healthy gingival mucosa   Hard palate, buccal, floor of mouth mucosa with xerostomia   Tongue - normal movement, thrush along dorsal and ventral surface, xerostomia, mucositis along lateral tongue   Oropharynx:  There are radiation changes to the mucosa throughout.  There is mucositis along the entire oropharynx both anteriorly and posteriorly on scope exam.  This extends along the soft palate.  It extends along the base of tongue the left side.  There is no obvious residual masses.  The pharyngeal exam is slightly limited due to the patient's gag reflex.   Hypopharynx:  Normal pyriform sinus   Posterior pharyngeal wall with mucositis present   No pooled secretions    Larynx:  Epiglottis, AE folds, false vocal cords, true vocal cords normal in appearance, bilaterally mobile cords   The left arytenoid and inferior AE fold have expected radiation edema that is not obstructive   Neck: No visible mass or asymmetry   thyroid -  Normal   Normal range of motion   Lymphatic:  No palpable masses   Cardiovascular:  warm, pink, well-perfused extremities without swelling, tenderness, or edema   Respiratory:  Normal respiratory effort, no stridor   Neuro/Psych.:  mood/affect -  normal  mental status -  normal         PROCEDURES:   Flexible fiberoptic laryngoscopy: Scope exam was indicated due to tonsil cancer. Verbal consent was obtained. The nasal cavity was prepped with an aerosolized solution of topical anesthetic and vasoconstrictive agent. The scope was passed through the anterior nasal cavity and advanced. Inspection of the nasopharynx revealed no gross abnormality.  There is mucositis along the posterior pharyngeal wall and extending into the lateral pharyngeal wall and left base of tongue.  There is no obvious residual mass in the  left tonsil/oropharynx.  The epiglottis has very mild thickening.  There is radiation edema of the inferior AE fold on the left side extending to the left arytenoid.  This is partially overlying the glottis but is not obstructive.  The vocal cords are mobile bilaterally.  The piriform sinuses are clear. The airway is patent. Procedure tolerated well with no immediate complications noted.                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 He completed definitive chemoradiation on 6/12/2021.    He is recovering from the side effects of his treatment.  He has residual mucositis in his oropharynx which I anticipate will improve.  He should continue with the salt and soda rinses to help with this.  We discussed using a humidifier to help with the dryness.  He does have thrush on exam.  It sounds like he was given a prescription for nystatin earlier today.    He was seen by SLP again today. We spent considerable time counseling him about our recommendations for the PEG to stay in place.  The patient received a consistent message from myself, Dr. Robbins, SLP team that he needs to continue to not use his PEG tube for more than just a few days, ideally for a month, and maintain his weight prior to removal.  We would like to wait until after his PET scan is negative before we proceed with removal, which is about a month from now.  He was understandably disappointed by this news but we did explain the reasoning for decision making.    He has a lab visit on the same day as his PET scan already scheduled, TSH ordered by Dr Schrader.     We will then see him back in 12 weeks post treatment in multi D clinic with his PET scan and labs. His scan/labs and follow-up visit are already  scheduled.    Thank you very much for the opportunity to participate in the care of your patient.      Ligia Fang MD, M.D.  Otolaryngology- Head & Neck Surgery      This note was dictated with voice recognition software and then edited. Please excuse any unintentional errors.         CC:  Ahmet Robbins MD  Department of Radiation Oncology  [facility]      Too Christensen MD  [address] [place] [address]

## 2021-07-30 NOTE — LETTER
2021      RE: Quan Murphy  [address]  [facility] [address]          Department of Radiation Oncology  [facility]  [address] [place] [address]  [phone]       Radiation Oncology Follow-up Visit  2021      Quan Murphy  MRN: [id number]   : 1950     DISEASE TREATED:   cT2 N2 M0 p16 positive squamous cell carcinoma of the left tonsil    RADIATION THERAPY DELIVERED:   Left oropharynx and neck: 6996 cGy in 33 fractions, from 2021 - 2021    SYSTEMIC THERAPY:  Cisplatin 40 mg/m  weekly x5 weeks    INTERVAL SINCE COMPLETION OF RADIATION THERAPY:   7 weeks    SUBJECTIVE:   Quan Murphy is a 70 year old male with a PMH significant for a stage II p16-positive squamous cell carcinoma of the left tonsil diagnosed in 3/2021 after he presented with a 1 month history of left-sided otalgia and globus sensation. Staging evaluation revealed a 2.7 x 2.2 x 2.7 cm left tonsillar primary tumor with multiple involved cervical lymph nodes including a 1.8 cm left level 2/3 node, a 3.4 cm left level 4 node and a suspicious 1.4 cm contralateral right level 2 node. He received curative intent chemoradiotherapy with concurrent weekly cisplatin as described above.    Mr. Murphy returns to ENT multi D clinic today for a routine posttreatment disease surveillance visit. On examination, he is continuing to make a slow recovery from his acute treatment-related toxicities. He has remained PEG dependent until approximately 4-5 days ago when he transitioned to a puréed/liquid p.o. diet. He is currently taking approximately 5-6 protein shakes daily and has not used his PEG over this timeframe for caloric supplementation. He does inquire about removal of his PEG today in clinic. He continues to have a symptomatic dry mouth and is having stable, ongoing oropharyngeal pain which he manages with as needed Vicodin and scheduled methadone. His energy level is improving  and he has returned to the gym for light workouts on an every-other-day basis.    PHYSICAL EXAM:  Weight: 89 kg  BP: 154/88  Pulse: 72  Temp: 36.4  C  Pain: 8     General: Mildly fatigued-appearing 70-year-old gentleman seated comfortably in an examination chair in no acute distress  HEENT: NC/AT.  EOMI.  No rhinorrhea or epistaxis.  EACs clear bilaterally with a small serous effusion noted behind the left TM.  Dry mucous membranes with mildly thickened oral cavity secretions.  Resolving scattered mucositis throughout the oral cavity, involving the left posterolateral oral tongue and soft palate.  No visible evidence of residual tumor.   Neck: Mild to moderate submental lymphedema.  Persistent 1.5-2 cm firm but mobile left level 3 node.  Pulmonary: No wheezing, stridor or respiratory distress  Skin: Resolving desquamation of the bilateral neck.  Scattered hyperpigmentation throughout the bilateral neck (left > right).    Dr. Fang performed a flexible nasopharyngoscopy in clinic.  Please see her note for complete details regarding this procedure.  Briefly, evaluation reveals no evidence of residual tumor.  There are extensive post-radiation treatment changes involving the base of tongue, epiglottis and left AE fold.    LABS AND IMAGIN2021 labs:  Electrolytes: WNL  LFTs: WNL  WBC: 5.4  Hemoglobin: 10.7  Platelets: 222    2021 CT neck:  Near complete resolution of left tonsillar mass with decreased size of metastatic adenopathy with a residual left level 2 node measuring 1.3 cm (previously 1.8 cm) and a 2.6 cm left level 3 node (previously 3.3 cm) with no additional cervical adenopathy appreciated.    IMPRESSION:   Mr. 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 He is currently a month and half out from the completion of treatment and is making a gradual recovery from his acute radiation-induced  toxicities. Interval imaging and clinical evaluation has demonstrated a positive response to therapy.    PLAN:   1. Follow-up in ENT multi D clinic in 6 weeks in coordination with 3-month posttreatment PET/CT  2. Continue p.o. diet with discontinuation of PEG once he is able to maintain adequate caloric intake by mouth with no weight loss for 1 month    Ahmet Robbins MD/PhD    Department of Radiation Oncology  [facility]

## 2021-07-30 NOTE — LETTER
7/30/2021         RE: Quan Murphy  [address]  [place] [address]        Dear Colleague,    Thank you for referring your patient, Quan Murphy, to the [facility]. Please see a copy of my visit note below.      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 Christensen). Path: SCC, non keratinizing, p16 +gardenia  3/23/21              CT neck. 2.9 x 2.7 x 3.5 cm L tonsil fullness involving soft palate, level 2-3 neck nodes  4/2/21                PET/CT. 2.7 x 2.2 x 2.7 cm L palatine tonsil mass (SUV 24), extension to oral cavity, 3.4 x 2.0 cm L level 4 node invading SCM, 1.8 x 1.8 cm L level 2A/3 node (SUV 7.9) w/ECS, 1.4 x 0.9 cm R level 2A node (SUV 6.7)  4/28~6/12/21     Chemoradiation with weekly cisplatin. No HD cisplatin due to CKD. Cisplatin held 6/2/21 due to malnutrition, SURESH  5/21/21              [facility] ED for fever to 102. No localizing source, cultures negative, not neutropenic  6/2~6/9/21         [facility] for malnutrition, inability to tolerate PO, SURESH. C/b respiratory arrest due to opioids, aspiration pneumonia, anxiety    He was seen today for oncology follow-up     Interval History:  -Doing okay, finally feels he may be turning a corner  -Frustrated we are recommended against removal of his PEG as it is psychologically interfering with his ability to eat.   -Has dry mouth, drinks lots of water  -Throat feels sore/burning  -Drinking 5-6 boost cartons daily, also eating eggs, soft toast in addition   -No cough or SOB, but wakes up at night with dry mouth which causes him to cough  -Wants to exercise more but his tube is  preventing him  -Birthday is tomorrow  -Denies neuropathy or other residual side effects from chemotherapy     10 point review of systems otherwise negative     Current Outpatient Medications   Medication Sig Dispense Refill     amoxicillin-clavulanate (AUGMENTIN-ES) 600-42.9 MG/5ML suspension Take 5 mLs (600 mg) by mouth 2 times daily 200 mL 0     ASPIRIN ADULT LOW STRENGTH 81 MG EC tablet TAKE ONE TABLET BY MOUTH ONCE DAILY 90 tablet 1     cevimeline (EVOXAC) 30 MG capsule Take 1 capsule (30 mg) by mouth 3 times daily 120 capsule 11     citalopram (CELEXA) 40 MG tablet TAKE ONE TABLET BY MOUTH ONCE DAILY 30 tablet 8     dronabinol (MARINOL) 2.5 MG capsule Take 1 capsule (2.5 mg) by mouth 2 times daily (before meals) 28 capsule 3     HYDROcodone-acetaminophen (NORCO)  MG per tablet Take 1-2 tablets by mouth every 4 hours as needed for severe pain (. No more than 10 tabs in 24 hours.) 150 tablet 0     hydrOXYzine (VISTARIL) 25 MG capsule TAKE 1 CAPSULE (25 MG) BY MOUTH 4 TIMES DAILY AS NEEDED FOR ANXIETY 120 capsule 4     methadone (DOLOPHINE) 5 MG tablet Take 1 tablet (5 mg) by mouth 2 times daily For 2 weeks. Then 1/2 tab in morning and 1 tab in the evening. 28 tablet 0     metoprolol succinate ER (TOPROL-XL) 50 MG 24 hr tablet Take 1 tablet (50 mg) by mouth daily 90 tablet 3     naloxone (NARCAN) 4 MG/0.1ML nasal spray Spray 1 spray (4 mg) into one nostril alternating nostrils as needed for opioid reversal every 2-3 minutes until assistance arrives 0.2 mL      nitroGLYcerin (NITROSTAT) 0.4 MG sublingual tablet For chest pain place 1 tablet under the tongue every 5 minutes for 3 doses. If symptoms persist 5 minutes after 1st dose call 911. 25 tablet 0     omeprazole (PRILOSEC) 40 MG DR capsule Take 1 capsule (40 mg) by mouth daily 90 capsule 3     prochlorperazine (COMPAZINE) 5 MG tablet TAKE 1 TABLET BY MOUTH EVERY 6 HOURS AS NEEDED FOR NAUSEA OR VOMITING       rosuvastatin (CRESTOR) 40 MG tablet Take 1  tablet (40 mg) by mouth daily 90 tablet 0     testosterone (ANDROGEL 1.62 % PUMP) 20.25 MG/ACT gel testosterone 20.25 mg/1.25 gram (1.62 %) transdermal gel pump       zolpidem (AMBIEN) 10 MG tablet TAKE 1 TABLET BY MOUTH EVERY EVENING AS NEEDED FOR SLEEP 30 tablet 0     Physical Examination:  BP (!) 154/88 (BP Location: Left arm, Patient Position: Sitting, Cuff Size: Adult Regular)   Pulse 72   Temp 97.6  F (36.4  C) (Oral)   Resp 16   Wt 89 kg (196 lb 1.6 oz)   SpO2 97%   BMI 29.38 kg/m    Wt Readings from Last 10 Encounters:   07/02/21 90.6 kg (199 lb 12.8 oz)   06/15/21 91.6 kg (202 lb)   06/10/21 93 kg (205 lb)   06/08/21 97.9 kg (215 lb 14.4 oz)   06/03/21 95.7 kg (211 lb)   06/02/21 94.7 kg (208 lb 11.2 oz)   06/01/21 94.8 kg (209 lb)   05/28/21 97.6 kg (215 lb 1.6 oz)   05/27/21 97.3 kg (214 lb 6.4 oz)   05/26/21 96.3 kg (212 lb 3.2 oz)     General: No acute distress; diaphoretic.   HEENT: Sclera anicteric. Oral mucosa pink; irritated mucosa L side of mouth. Thrush scattered on tongue and oropharnyx  Lymph: No lymphadenopathy in neck  Heart: Regular, rate, and rhythm  Lungs: Clear to ascultation bilaterally  Abdomen: Positive bowel sounds. Soft, non-distended, non-tender. PEG without local tenderness of erythema   Extremities: no lower extremity edema  Neuro: Cranial nerves grossly intact  Rash: none    Laboratory Data:  Results for MANUEL MONTOYA (MRN [id number]) as of 7/30/2021 15:20   Ref. Range 7/30/2021 10:10   Sodium Latest Ref Range: 133 - 144 mmol/L 137   Potassium Latest Ref Range: 3.4 - 5.3 mmol/L 3.9   Chloride Latest Ref Range: 94 - 109 mmol/L 101   Carbon Dioxide Latest Ref Range: 20 - 32 mmol/L 31   Urea Nitrogen Latest Ref Range: 7 - 30 mg/dL 20   Creatinine Latest Ref Range: 0.66 - 1.25 mg/dL 0.99   GFR Estimate Latest Ref Range: >60 mL/min/1.73m2 77   Calcium Latest Ref Range: 8.5 - 10.1 mg/dL 9.5   Anion Gap Latest Ref Range: 3 - 14 mmol/L 5   Magnesium Latest Ref Range: 1.6 - 2.3  mg/dL 2.0   Albumin Latest Ref Range: 3.4 - 5.0 g/dL 3.2 (L)   Protein Total Latest Ref Range: 6.8 - 8.8 g/dL 7.2   Bilirubin Total Latest Ref Range: 0.2 - 1.3 mg/dL 0.5   Alkaline Phosphatase Latest Ref Range: 40 - 150 U/L 72   ALT Latest Ref Range: 0 - 70 U/L 57   AST Latest Ref Range: 0 - 45 U/L 35   Glucose Latest Ref Range: 70 - 99 mg/dL 150 (H)   WBC Latest Ref Range: 4.0 - 11.0 10e3/uL 5.4   Hemoglobin Latest Ref Range: 13.3 - 17.7 g/dL 10.7 (L)   Hematocrit Latest Ref Range: 40.0 - 53.0 % 34.0 (L)   Platelet Count Latest Ref Range: 150 - 450 10e3/uL 222   RBC Count Latest Ref Range: 4.40 - 5.90 10e6/uL 3.64 (L)   MCV Latest Ref Range: 78 - 100 fL 93   MCH Latest Ref Range: 26.5 - 33.0 pg 29.4   MCHC Latest Ref Range: 31.5 - 36.5 g/dL 31.5   RDW Latest Ref Range: 10.0 - 15.0 % 16.3 (H)   % Neutrophils Latest Units: % 82   % Lymphocytes Latest Units: % 9   % Monocytes Latest Units: % 7   % Eosinophils Latest Units: % 1   Absolute Basophils Latest Ref Range: 0.0 - 0.2 10e3/uL 0.0   % Basophils Latest Units: % 0   Absolute Eosinophils Latest Ref Range: 0.0 - 0.7 10e3/uL 0.1   Absolute Immature Granulocytes Latest Ref Range: <=0.0 10e3/uL 0.1 (H)   Absolute Lymphocytes Latest Ref Range: 0.8 - 5.3 10e3/uL 0.5 (L)   Absolute Monocytes Latest Ref Range: 0.0 - 1.3 10e3/uL 0.4   % Immature Granulocytes Latest Units: % 1   Absolute Neutrophils Latest Ref Range: 1.6 - 8.3 10e3/uL 4.4   Absolute NRBCs Latest Units: 10e3/uL 0.0   NRBCs per 100 WBC Latest Ref Range: <1 /100 0         Assessment and Plan:  1. Onc  SCC L tonsil, [id number], p16 +gardenia, ECS +gardenia: Completed chemoradiation 6/12/21. Recovering as expected after treatment. This morning's labs reviewed and no concerning findings. Again reviewed with patient the expected timing of recovery. Unless acute events develop in the next 4 weeks, follow up with Dr. Schrader should suffice.   -CT and rad onc/ENT visit is this afternoon.   -PET is scheduled for September.    2.  ENT  Dysphagia: Seeing speech today. He should continue exercises    Pain with acute on chronic opioid use, on methadone and Norco. He has tapering plan with Dr. Serrano; has follow up with him next week. Continue salt/soda swishes as well    Phlegm/secretions: improving    Thrush: fluconazole x7 days. Pharmacy will review the QT risk with him and if New chooses, can change to nystatin, otherwise based on methadone dose fluconazole was felt to be safe by pharmacy.     Xerostomia: has tried cevimeline and another medication (unsure of name) without relief. Asked he discuss with ENT as I am less familiar with third line treatments. Otherwise encouraged good PO liquid intake  - again discussed dry mouth can be long term issue..     3. MURRAY  Stopped PEG feedings four days ago and now doing 5-6  Ensure/boost daily orally. He is doing some soft food as well but really not doing much for solids. He recently met with RD. I recommended he monitor his weight daily at home for a reliable trend.   -I reviewed the rationale for our recommendation to keep PEG tube until he can maintain weight on oral food alone for a period of time.     Hx CKD, creat stable    4. Cards  No active concerns, continue Metoprolol and Crestor. Will need PCP or cardsfollow-up for ASCVD in future.      60 minutes spent on the date of the encounter doing chart review, review of test results, interpretation of tests, patient visit, documentation and discussion with other provider(s)     Leisa Zarate CNP on 7/30/2021 at 3:34 PM                   Again, thank you for allowing me to participate in the care of your patient.        Sincerely,        Leisa Zarate CNP

## 2021-07-30 NOTE — NURSING NOTE
Chief Complaint   Patient presents with     RECHECK     follow up     Weight 89 kg (196 lb 3.4 oz).    Mati Littlejohn LPN

## 2021-08-02 ENCOUNTER — PATIENT OUTREACH (OUTPATIENT)
Dept: OTOLARYNGOLOGY | Facility: CLINIC | Age: 71
End: 2021-08-02

## 2021-08-02 NOTE — PROGRESS NOTES
Called patient with the following CT scan results:      Impression:  1. Near-complete resolution of left tonsillar mass.  2. Decrease in the size of left sided metastatic nodes. No new nodes.        Reviewed with patient that we will continue with follow-up as scheduled and PET scan on 9/3. Patient was encouraged to call sooner with any further questions or concerns.       Chela Mariano, RN, BSN

## 2021-08-10 ENCOUNTER — VIRTUAL VISIT (OUTPATIENT)
Dept: ONCOLOGY | Facility: CLINIC | Age: 71
End: 2021-08-10
Payer: MEDICARE

## 2021-08-10 DIAGNOSIS — C09.9 SQUAMOUS CELL CARCINOMA OF LEFT TONSIL (H): Primary | ICD-10-CM

## 2021-08-10 PROCEDURE — 97803 MED NUTRITION INDIV SUBSEQ: CPT | Mod: 95 | Performed by: DIETITIAN, REGISTERED

## 2021-08-10 NOTE — PROGRESS NOTES
"The patient has been notified of the following:      \"We have found that certain health care needs can be provided without the need for a face to face visit.  This service lets us provide the care you need with a phone conversation.       I will have full access to your [place] medical record during this entire phone call.   I will be taking notes for your medical record.      Since this is like an office visit, we will bill your insurance company for this service.       There are potential benefits and risks of telephone visits (e.g. limits to patient confidentiality) that differ from in-person visits.?  Confidentiality still applies for telephone services, and nobody will record the visit.  It is important to be in a quiet, private space that is free of distractions (including cell phone or other devices) during the visit.??      If during the course of the call I believe a telephone visit is not appropriate, you will not be charged for this service\"     Consent has been obtained for this service by care team member: Yes     CLINICAL NUTRITION SERVICES - REASSESSMENT NOTE   EVALUATION OF PREVIOUS PLAN OF CARE:   Referring Physician: Itzel  Time spent with patient: 30 minutes.    Current diet: soft foods  Current appetite/intake: fair/improved  PEG Tube: Yes, has not used his tube for two weeks since 7/27.   He is flushing his tube several times/day and taking his ProSource via FT BID.       Monitoring from previous assessment:   -Food intake - New's intake has increased significantly.  He is now eating applesauce, toast with butter, cream of wheat, pancakes with syrup and butter, variety of soups and Ensure Plus  -EN intake/wean: New stopped using his feeding tube 2 weeks ago since previous RD visit.  He has replaced his tube feeding formula with 5 bottles of Ensure Plus.  He would like to have his feeding tube removed on 9/3 at his next ENT visit.   -Liquid meal replacement or supplement - 5 bottles Ensure " Plus - 75g protein, 1875 calories  -Weight trends - trending up per pt report from home scale 183-->193 lb today  Wt Readings from Last 6 Encounters:   21 89 kg (196 lb 3.4 oz)   21 89 kg (196 lb 1.6 oz)   21 90.6 kg (199 lb 12.8 oz)   06/15/21 91.6 kg (202 lb)   06/10/21 93 kg (205 lb)   21 97.9 kg (215 lb 14.4 oz)     Previous Goals:   1. PO intake of >2000 debby, 80g protein/day   2. Weight stability at 200 lbs   Evaluation: Met/onoing for the next 3 weeks to justify removal of feeding tube.    Previous Nutrition Diagnosis:   Inadequate oral intake related to xerostomia and decreased appetite as evidenced by inability to wean off tube feeding with lack of PO intake.    Evaluation: Improving   NEW FINDINGS:   No longer using PEG tube for calories    CURRENT NUTRITION DIAGNOSIS   No nutrition diagnosis identified at this time   INTERVENTIONS   Recommendations / Nutrition Prescription   1. >2200 calories, 100g protein/day via PO intake; 8 cups non-caffeine containing beverages/day   2. Continue taking 5 Ensure Plus/day as PO intake increase, may try replacing Ensure ONS with soft/solid foods   Implementation  Composition of Meals and Snacks, General/healthful diet and Medical Food Supplement - reviewed total calorie, protein and hydration needs as above  Goals   1. PO intake of ONS/soft, solid foods to meet 100% of estimated nutrition needs   2. Weight maintenance     Follow up/Monitorin week follow-up  Food intake and Weight

## 2021-08-10 NOTE — LETTER
"    8/10/2021         RE: Quan Murphy  [address] [facility] [address]        Dear Colleague,    Thank you for referring your patient, Quan Murphy, to the [facility] [place]. Please see a copy of my visit note below.    The patient has been notified of the following:      \"We have found that certain health care needs can be provided without the need for a face to face visit.  This service lets us provide the care you need with a phone conversation.       I will have full access to your [place] medical record during this entire phone call.   I will be taking notes for your medical record.      Since this is like an office visit, we will bill your insurance company for this service.       There are potential benefits and risks of telephone visits (e.g. limits to patient confidentiality) that differ from in-person visits.?  Confidentiality still applies for telephone services, and nobody will record the visit.  It is important to be in a quiet, private space that is free of distractions (including cell phone or other devices) during the visit.??      If during the course of the call I believe a telephone visit is not appropriate, you will not be charged for this service\"     Consent has been obtained for this service by care team member: Yes     CLINICAL NUTRITION SERVICES - REASSESSMENT NOTE   EVALUATION OF PREVIOUS PLAN OF CARE:   Referring Physician: Itzel  Time spent with patient: 30 minutes.    Current diet: soft foods  Current appetite/intake: fair/improved  PEG Tube: Yes, has not used his tube for two weeks since 7/27.   He is flushing his tube several times/day and taking his ProSource via FT BID.       Monitoring from previous assessment:   -Food intake - New's intake has increased significantly.  He is now eating applesauce, toast with butter, cream of wheat, pancakes with syrup and butter, variety of soups and Ensure Plus  -EN intake/wean: New stopped using his feeding " tube 2 weeks ago since previous RD visit.  He has replaced his tube feeding formula with 5 bottles of Ensure Plus.  He would like to have his feeding tube removed on 9/3 at his next ENT visit.   -Liquid meal replacement or supplement - 5 bottles Ensure Plus - 75g protein, 1875 calories  -Weight trends - trending up per pt report from home scale 183-->193 lb today  Wt Readings from Last 6 Encounters:   21 89 kg (196 lb 3.4 oz)   21 89 kg (196 lb 1.6 oz)   21 90.6 kg (199 lb 12.8 oz)   06/15/21 91.6 kg (202 lb)   06/10/21 93 kg (205 lb)   21 97.9 kg (215 lb 14.4 oz)     Previous Goals:   1. PO intake of >2000 debby, 80g protein/day   2. Weight stability at 200 lbs   Evaluation: Met/onoing for the next 3 weeks to justify removal of feeding tube.    Previous Nutrition Diagnosis:   Inadequate oral intake related to xerostomia and decreased appetite as evidenced by inability to wean off tube feeding with lack of PO intake.    Evaluation: Improving   NEW FINDINGS:   No longer using PEG tube for calories    CURRENT NUTRITION DIAGNOSIS   No nutrition diagnosis identified at this time   INTERVENTIONS   Recommendations / Nutrition Prescription   1. >2200 calories, 100g protein/day via PO intake; 8 cups non-caffeine containing beverages/day   2. Continue taking 5 Ensure Plus/day as PO intake increase, may try replacing Ensure ONS with soft/solid foods   Implementation  Composition of Meals and Snacks, General/healthful diet and Medical Food Supplement - reviewed total calorie, protein and hydration needs as above  Goals   1. PO intake of ONS/soft, solid foods to meet 100% of estimated nutrition needs   2. Weight maintenance     Follow up/Monitorin week follow-up  Food intake and Weight          Again, thank you for allowing me to participate in the care of your patient.        Sincerely,        Crystal Ramos RD

## 2021-08-11 ENCOUNTER — PATIENT OUTREACH (OUTPATIENT)
Dept: PALLIATIVE CARE | Facility: CLINIC | Age: 71
End: 2021-08-11

## 2021-08-11 DIAGNOSIS — G89.3 NEOPLASM RELATED PAIN: ICD-10-CM

## 2021-08-11 DIAGNOSIS — C09.9 SQUAMOUS CELL CARCINOMA OF LEFT TONSIL (H): ICD-10-CM

## 2021-08-11 RX ORDER — HYDROCODONE BITARTRATE AND ACETAMINOPHEN 10; 325 MG/1; MG/1
1-2 TABLET ORAL EVERY 4 HOURS PRN
Qty: 150 TABLET | Refills: 0 | Status: SHIPPED | OUTPATIENT
Start: 2021-08-12 | End: 2021-08-18

## 2021-08-11 NOTE — PROGRESS NOTES
Received VM from patient requesting refill of norco, will be out on the 13th.    Last refill: 7/30/2021  Last office visit: 7/16/2021  Scheduled for follow up 8/18/2021     Will route request to MD for review.     Reviewed [place]  Report.

## 2021-08-12 NOTE — PROGRESS NOTES
Department of Radiation Oncology  [facility]  [address] [place] [address]  [phone]       Radiation Oncology Follow-up Visit  2021      Quan Murphy  MRN: [id number]   : 1950     DISEASE TREATED:   cT2 N2 M0 p16 positive squamous cell carcinoma of the left tonsil    RADIATION THERAPY DELIVERED:   Left oropharynx and neck: 6996 cGy in 33 fractions, from 2021 - 2021    SYSTEMIC THERAPY:  Cisplatin 40 mg/m  weekly x5 weeks    INTERVAL SINCE COMPLETION OF RADIATION THERAPY:   7 weeks    SUBJECTIVE:   Quan Murphy is a 70 year old male with a PMH significant for a stage II p16-positive squamous cell carcinoma of the left tonsil diagnosed in 3/2021 after he presented with a 1 month history of left-sided otalgia and globus sensation. Staging evaluation revealed a 2.7 x 2.2 x 2.7 cm left tonsillar primary tumor with multiple involved cervical lymph nodes including a 1.8 cm left level 2/3 node, a 3.4 cm left level 4 node and a suspicious 1.4 cm contralateral right level 2 node. He received curative intent chemoradiotherapy with concurrent weekly cisplatin as described above.    Mr. Murphy returns to ENT multi D clinic today for a routine posttreatment disease surveillance visit. On examination, he is continuing to make a slow recovery from his acute treatment-related toxicities. He has remained PEG dependent until approximately 4-5 days ago when he transitioned to a puréed/liquid p.o. diet. He is currently taking approximately 5-6 protein shakes daily and has not used his PEG over this timeframe for caloric supplementation. He does inquire about removal of his PEG today in clinic. He continues to have a symptomatic dry mouth and is having stable, ongoing oropharyngeal pain which he manages with as needed Vicodin and scheduled methadone. His energy level is improving and he has returned to the gym for light workouts on an every-other-day  basis.    PHYSICAL EXAM:  Weight: 89 kg  BP: 154/88  Pulse: 72  Temp: 36.4  C  Pain: 8     General: Mildly fatigued-appearing 70-year-old gentleman seated comfortably in an examination chair in no acute distress  HEENT: NC/AT.  EOMI.  No rhinorrhea or epistaxis.  EACs clear bilaterally with a small serous effusion noted behind the left TM.  Dry mucous membranes with mildly thickened oral cavity secretions.  Resolving scattered mucositis throughout the oral cavity, involving the left posterolateral oral tongue and soft palate.  No visible evidence of residual tumor.   Neck: Mild to moderate submental lymphedema.  Persistent 1.5-2 cm firm but mobile left level 3 node.  Pulmonary: No wheezing, stridor or respiratory distress  Skin: Resolving desquamation of the bilateral neck.  Scattered hyperpigmentation throughout the bilateral neck (left > right).    Dr. Fang performed a flexible nasopharyngoscopy in clinic.  Please see her note for complete details regarding this procedure.  Briefly, evaluation reveals no evidence of residual tumor.  There are extensive post-radiation treatment changes involving the base of tongue, epiglottis and left AE fold.    LABS AND IMAGIN2021 labs:  Electrolytes: WNL  LFTs: WNL  WBC: 5.4  Hemoglobin: 10.7  Platelets: 222    2021 CT neck:  Near complete resolution of left tonsillar mass with decreased size of metastatic adenopathy with a residual left level 2 node measuring 1.3 cm (previously 1.8 cm) and a 2.6 cm left level 3 node (previously 3.3 cm) with no additional cervical adenopathy appreciated.    IMPRESSION:   Mr. 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 He is currently a month and half out from the completion of treatment and is making a gradual recovery from his acute radiation-induced toxicities. Interval imaging and clinical evaluation has demonstrated a positive  response to therapy.    PLAN:   1. Follow-up in ENT multi D clinic in 6 weeks in coordination with 3-month posttreatment PET/CT  2. Continue p.o. diet with discontinuation of PEG once he is able to maintain adequate caloric intake by mouth with no weight loss for 1 month    Ahmet Robbins MD/PhD    Department of Radiation Oncology  [facility]

## 2021-08-13 ENCOUNTER — TELEPHONE (OUTPATIENT)
Dept: ONCOLOGY | Facility: CLINIC | Age: 71
End: 2021-08-13

## 2021-08-13 DIAGNOSIS — C09.9 SQUAMOUS CELL CARCINOMA OF LEFT TONSIL (H): ICD-10-CM

## 2021-08-13 RX ORDER — FLUCONAZOLE 200 MG/1
200 TABLET ORAL DAILY
Qty: 7 TABLET | Refills: 0 | Status: SHIPPED | OUTPATIENT
Start: 2021-08-13 | End: 2022-02-02

## 2021-08-13 NOTE — TELEPHONE ENCOUNTER
Continues to have white patches on sides of mouth and sides of tongue.Some pain in mouth, side of tongue is a bit raw on side where he received the radiation, chemotherapy.    Wondering if another round of the diflucan will help to make thrush go completely away.   No other concerns at present. Still trying to mix in solids so he can get his peg tube out as soon as possible.    If they do send a prescription please send to [facility] in [place].

## 2021-08-13 NOTE — TELEPHONE ENCOUNTER
Per Leisa Zarate sent more fluconazole and he should be rinsing his mouth 4x a day with salt/baking soda water rinse 4 x a day to keep mouth clean.   Call placed to New to relay above information, he has been rinsing his mouth with baking soda/salt water rinses.

## 2021-08-18 ENCOUNTER — VIRTUAL VISIT (OUTPATIENT)
Dept: PALLIATIVE CARE | Facility: CLINIC | Age: 71
End: 2021-08-18
Attending: INTERNAL MEDICINE
Payer: MEDICARE

## 2021-08-18 DIAGNOSIS — C09.9 SQUAMOUS CELL CARCINOMA OF LEFT TONSIL (H): ICD-10-CM

## 2021-08-18 DIAGNOSIS — G89.3 NEOPLASM RELATED PAIN: ICD-10-CM

## 2021-08-18 DIAGNOSIS — G47.01 SLEEP DISORDER DUE TO A GENERAL MEDICAL CONDITION, INSOMNIA TYPE: ICD-10-CM

## 2021-08-18 PROCEDURE — 99215 OFFICE O/P EST HI 40 MIN: CPT | Mod: 95 | Performed by: INTERNAL MEDICINE

## 2021-08-18 RX ORDER — ZOLPIDEM TARTRATE 12.5 MG/1
12.5 TABLET, FILM COATED, EXTENDED RELEASE ORAL
Qty: 30 TABLET | Refills: 3 | Status: SHIPPED | OUTPATIENT
Start: 2021-08-26 | End: 2021-12-06

## 2021-08-18 RX ORDER — HYDROCODONE BITARTRATE AND ACETAMINOPHEN 10; 325 MG/1; MG/1
1-2 TABLET ORAL EVERY 4 HOURS PRN
Qty: 150 TABLET | Refills: 0 | COMMUNITY
Start: 2021-08-18 | End: 2021-08-24

## 2021-08-18 NOTE — LETTER
"8/18/2021       RE: Quan Murphy  [address]  [facility] [address]     Dear Colleague,    Thank you for referring your patient, Quan Murphy[facility]. Please see a copy of my visit note below.    Palliative Care Outpatient Clinic    Patient ID:  Medical - He has SCC L tonsil dx 3/2021 [id number] p16+  Definitive chemoradiotherapy 4-6/2021 6/2/2021 admitted with FTT/urgent need for GT placement and had a respiratory arrest 2 d later inpatient thought to be drug-induced. Got PEG and was discharged.  6/12/2021 radiation end date     He has CKD and chronic pain (headaches/sinus pain) on long-term hydrocodone therapy.  On ambien and alprazolam for insomnia/anxiety     Social - Lives with wife. 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      Opioid Safety -  +naloxone  See 5/14/2021 opioid risk/safety discussion; I consider him higher risk due to long history of higher risk polysubstance use (opioids, benzos, alcohol), self-titration of meds, anxiety. Expectations for opioid tapering after cancer treatment discussed. Knows plan is to taper him down to his baseline opioid use and will 'hand back' his chronic pain management to his PCP at that point.     History:  History gathered today from: patient, medical chart    \"I'm doing better.\" Slow though, but feels he continues to make regular progress.  PO: soft foods & liquids going well.  Dry mouth is an issue - he is not taking the cevimeline it seems, and I reminded he has refills on it.    Pain:  Good in AM, worse in afternoon and PM. Eating tougher in PM due to pain.  HC 10mg 10 tabs a day.  Confusion about methadone dose: I instructed him to taper and it was on his pill bottle but he reports his current pill bottle says otherwise and I couldn't really figure out what's going on. He's taking 1/2 tab qid of " methadone currently.     HC 10mg/325 8/12/21. Was on #120 HC 7.5/325 tabs monthly long-term.    Insomnia: ambien works but wakes up a few hours later. No side effects.    PE: There were no vitals taken for this visit.   Wt Readings from Last 3 Encounters:   07/30/21 89 kg (196 lb 3.4 oz)   07/30/21 89 kg (196 lb 1.6 oz)   07/02/21 90.6 kg (199 lb 12.8 oz)     Alert  Looks healthier  Clear sensorium  Speech fluent, voice very slightly wet      Data reviewed:  I reviewed recent labs and imaging, my comments:  Cr 0.99    CT  Impression:  1. Near-complete resolution of left tonsillar mass.  2. Decrease in the size of left sided metastatic nodes. No new nodes. '     database reviewed: y    Impression & Recommendations:  72 yo with H&N cancer s/p definitive chemorads.     Pain improving--it will continue to be a slow recovery and we will continue to taper    Methadone 1/2 tab tid for a week, then bid for a week, then daily for a week, then stop.  Vicodin: 9 a day for 2 weeks, then 8 a day, then I'll see him in 3 weeks.    Ok to switch Ambien from 10 to 12.5mgCR at night    41 minutes spent on the date of the encounter doing chart review, history and exam, patient education & counseling, documentation and other activities as noted above.    Thank you for involving us in the patient's care.   Elier Serrano MD / Palliative Medicine / Text me via [facility].

## 2021-08-18 NOTE — PROGRESS NOTES
"New is a 71 year old who is being evaluated via a billable video visit.      How would you like to obtain your AVS? Mail a copy  If the video visit is dropped, the invitation should be resent by: Text to cell phone: [phone]  Will anyone else be joining your video visit?   Spouse, Alessandra will be joining him on the visit     Palliative Care Outpatient Clinic      Patient ID:  Medical - He has SCC L tonsil dx 3/2021 [id number] p16+  Definitive chemoradiotherapy 4-6/2021 6/2/2021 admitted with FTT/urgent need for GT placement and had a respiratory arrest 2 d later inpatient thought to be drug-induced. Got PEG and was discharged.  6/12/2021 radiation end date     He has CKD and chronic pain (headaches/sinus pain) on long-term hydrocodone therapy.  On ambien and alprazolam for insomnia/anxiety     Social - Lives with wife. 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      Opioid Safety -  +naloxone  See 5/14/2021 opioid risk/safety discussion; I consider him higher risk due to long history of higher risk polysubstance use (opioids, benzos, alcohol), self-titration of meds, anxiety. Expectations for opioid tapering after cancer treatment discussed. Knows plan is to taper him down to his baseline opioid use and will 'hand back' his chronic pain management to his PCP at that point.     History:  History gathered today from: patient, medical chart    \"I'm doing better.\" Slow though, but feels he continues to make regular progress.  PO: soft foods & liquids going well.  Dry mouth is an issue - he is not taking the cevimeline it seems, and I reminded he has refills on it.    Pain:  Good in AM, worse in afternoon and PM. Eating tougher in PM due to pain.  HC 10mg 10 tabs a day.  Confusion about methadone dose: I instructed him to taper and it was on his pill bottle but he reports his current pill bottle says otherwise and I couldn't really figure out what's going on. " He's taking 1/2 tab qid of methadone currently.     HC 10mg/325 8/12/21. Was on #120 HC 7.5/325 tabs monthly long-term.    Insomnia: ambien works but wakes up a few hours later. No side effects.    PE: There were no vitals taken for this visit.   Wt Readings from Last 3 Encounters:   07/30/21 89 kg (196 lb 3.4 oz)   07/30/21 89 kg (196 lb 1.6 oz)   07/02/21 90.6 kg (199 lb 12.8 oz)     Alert  Looks healthier  Clear sensorium  Speech fluent, voice very slightly wet      Data reviewed:  I reviewed recent labs and imaging, my comments:  Cr 0.99    CT  Impression:  1. Near-complete resolution of left tonsillar mass.  2. Decrease in the size of left sided metastatic nodes. No new nodes. '     database reviewed: y      Impression & Recommendations:  70 yo with H&N cancer s/p definitive chemorads.     Pain improving--it will continue to be a slow recovery and we will continue to taper    Methadone 1/2 tab tid for a week, then bid for a week, then daily for a week, then stop.  Vicodin: 9 a day for 2 weeks, then 8 a day, then I'll see him in 3 weeks.    Ok to switch Ambien from 10 to 12.5mgCR at night    41 minutes spent on the date of the encounter doing chart review, history and exam, patient education & counseling, documentation and other activities as noted above.    Thank you for involving us in the patient's care.   Elier Serrano MD / Palliative Medicine / Text me via [facility].      Video-Visit Details  Type of service:  video  Originating Location (pt. Location): Home  Distant Location (provider location):  [facility]  Platform used for Video Visit: [place]

## 2021-08-18 NOTE — PATIENT INSTRUCTIONS
METHADONE:  Take 1/2 tablet, 3 times a day for a week.  Then 1/2 tablet, 2 times a day for a week.  Then 1/2 tablet once a day for a week.    HYDROCODONE:  Take a maximum of 9 tabs a day for 2 weeks.  Then reduce to a maximum of 8 tabs a day until you see me.

## 2021-08-19 DIAGNOSIS — C09.9 SQUAMOUS CELL CARCINOMA OF LEFT TONSIL (H): ICD-10-CM

## 2021-08-19 RX ORDER — CEVIMELINE HYDROCHLORIDE 30 MG/1
30 CAPSULE ORAL 3 TIMES DAILY
Qty: 120 CAPSULE | Refills: 11 | Status: SHIPPED | OUTPATIENT
Start: 2021-08-19 | End: 2023-01-01

## 2021-08-24 ENCOUNTER — HOSPITAL ENCOUNTER (EMERGENCY)
Facility: CLINIC | Age: 71
Discharge: HOME | End: 2021-08-24
Attending: NURSE PRACTITIONER | Admitting: NURSE PRACTITIONER
Payer: MEDICARE

## 2021-08-24 ENCOUNTER — VIRTUAL VISIT (OUTPATIENT)
Dept: ONCOLOGY | Facility: CLINIC | Age: 71
End: 2021-08-24
Payer: MEDICARE

## 2021-08-24 ENCOUNTER — PATIENT OUTREACH (OUTPATIENT)
Dept: PALLIATIVE CARE | Facility: CLINIC | Age: 71
End: 2021-08-24

## 2021-08-24 ENCOUNTER — TELEPHONE (OUTPATIENT)
Dept: INTERNAL MEDICINE | Facility: CLINIC | Age: 71
End: 2021-08-24

## 2021-08-24 VITALS
OXYGEN SATURATION: 99 % | RESPIRATION RATE: 16 BRPM | DIASTOLIC BLOOD PRESSURE: 78 MMHG | HEART RATE: 72 BPM | SYSTOLIC BLOOD PRESSURE: 140 MMHG | BODY MASS INDEX: 29.37 KG/M2 | WEIGHT: 196 LBS | TEMPERATURE: 98.5 F

## 2021-08-24 DIAGNOSIS — L08.9 SKIN INFECTION AT GASTROSTOMY TUBE SITE (H): ICD-10-CM

## 2021-08-24 DIAGNOSIS — K94.22 SKIN INFECTION AT GASTROSTOMY TUBE SITE (H): ICD-10-CM

## 2021-08-24 DIAGNOSIS — G89.3 NEOPLASM RELATED PAIN: ICD-10-CM

## 2021-08-24 DIAGNOSIS — C09.9 SQUAMOUS CELL CARCINOMA OF LEFT TONSIL (H): ICD-10-CM

## 2021-08-24 DIAGNOSIS — C09.9 SQUAMOUS CELL CARCINOMA OF LEFT TONSIL (H): Primary | ICD-10-CM

## 2021-08-24 PROCEDURE — 99283 EMERGENCY DEPT VISIT LOW MDM: CPT | Performed by: NURSE PRACTITIONER

## 2021-08-24 PROCEDURE — 97803 MED NUTRITION INDIV SUBSEQ: CPT | Mod: 95 | Performed by: DIETITIAN, REGISTERED

## 2021-08-24 PROCEDURE — 99284 EMERGENCY DEPT VISIT MOD MDM: CPT | Performed by: NURSE PRACTITIONER

## 2021-08-24 RX ORDER — HYDROCODONE BITARTRATE AND ACETAMINOPHEN 10; 325 MG/1; MG/1
1-2 TABLET ORAL EVERY 4 HOURS PRN
Qty: 112 TABLET | Refills: 0 | Status: SHIPPED | OUTPATIENT
Start: 2021-08-25 | End: 2021-09-02

## 2021-08-24 RX ORDER — CEPHALEXIN 500 MG/1
500 CAPSULE ORAL 4 TIMES DAILY
Qty: 28 CAPSULE | Refills: 0 | Status: SHIPPED | OUTPATIENT
Start: 2021-08-24 | End: 2021-08-31

## 2021-08-24 NOTE — LETTER
"    8/24/2021         RE: Quan Murphy  [address] [facility] [address]        Dear Colleague,    Thank you for referring your patient, Quan Murphy, to the [facility] [place]. Please see a copy of my visit note below.    The patient has been notified of the following:      \"We have found that certain health care needs can be provided without the need for a face to face visit.  This service lets us provide the care you need with a phone conversation.       I will have full access to your [place] medical record during this entire phone call.   I will be taking notes for your medical record.      Since this is like an office visit, we will bill your insurance company for this service.       There are potential benefits and risks of telephone visits (e.g. limits to patient confidentiality) that differ from in-person visits.?  Confidentiality still applies for telephone services, and nobody will record the visit.  It is important to be in a quiet, private space that is free of distractions (including cell phone or other devices) during the visit.??      If during the course of the call I believe a telephone visit is not appropriate, you will not be charged for this service\"     Consent has been obtained for this service by care team member: Yes     CLINICAL NUTRITION SERVICES - REASSESSMENT NOTE   EVALUATION OF PREVIOUS PLAN OF CARE:   Referring Physician: Elliot   Time spent with patient: 30 minutes.    Current diet: regular foods, soft foods  Current appetite/intake: improved appetite and intake  PEG Tube: Not using; has not used for one month    New expresses his concern about his feeding tube as it's tender at sight, bright red, dime size red, gray green drainage.  He is adamant that he wants his feeding tube out this week versus waiting for his PET scan on 9/3.     Monitoring from previous assessment:   -Food intake - He has been eating 3 small meals/day.  He has added a lot of " foods to his diet as well.   He is eating eggs, sausage, tuna and chicken salad, cottage cheese, peaches and pears, baked fish, cooked vegetables.  He continues to eat soft cereals with 2% milk, yogurt, pancakes with syrup/butter and nutrition shakes  -Liquid meal replacement or supplement - taking 5 Ensure Enlive daily (1750 debby, 100 g protein) - meeting 100% of protein needs and 80% calorie needs  -Weight trends -   Wt Readings from Last 7 Encounters:   21 89 kg (196 lb 3.4 oz)   21 89 kg (196 lb 1.6 oz)   21 90.6 kg (199 lb 12.8 oz)   06/15/21 91.6 kg (202 lb)   06/10/21 93 kg (205 lb)   21 97.9 kg (215 lb 14.4 oz)   21 95.7 kg (211 lb)     Previous Goals:   1. PO intake of ONS/soft, solid foods to meet 100% of estimated nutrition needs   2. Weight maintenance   Evaluation: Met   Previous Nutrition Diagnosis:   No nutrition diagnosis identified at this time   Evaluation: No change   NEW FINDINGS:   ? Infected tube site   CURRENT NUTRITION DIAGNOSIS   No nutrition diagnosis identified at this time   INTERVENTIONS   Recommendations / Nutrition Prescription   1. Suggested reduce ONS to 4/day  2. Increase PO intake of solid, soft, most foods by 400 debby, 20g protein/day   Implementation  Composition of Meals and Snacks, General/healthful diet and Medical Food Supplement - reviewed total calorie, protein and hydration needs as above.    Goals   1. PO intake of ONS/soft, solid foods to meet 100% of estimated nutrition needs   2. Weight maintenance      Follow up/Monitorin week follow-up - one month  Food intake and Weight      Again, thank you for allowing me to participate in the care of your patient.        Sincerely,        Crystal Ramos RD

## 2021-08-24 NOTE — PROGRESS NOTES
Received VM from patient requesting refill of norco. He notes he has a few days left, but wanted to give us notice on the refill.  Notes he is on a schedule from Dr. Serrano and he will know how to change the script. Has enough methadone for now.     Last refill: 8/12/2021  Last office visit: 8/18/2021  Message sent to  for follow up     Will route request to MD for review.     Reviewed [place]  Report.

## 2021-08-24 NOTE — TELEPHONE ENCOUNTER
Prior Authorization Retail Medication Request- [id number]    Medication/Dose: hydrOXYzine (VISTARIL) 25 MG capsule  ICD code (if different than what is on RX):    Previously Tried and Failed:    Rationale:      Insurance Name:  Medicare   Insurance ID:  [id number]       Pharmacy Information (if different than what is on RX)  Name:    Phone:

## 2021-08-24 NOTE — TELEPHONE ENCOUNTER
Prior Authorization Approval    Authorization Effective Date: 5/26/2021  Authorization Expiration Date: 8/24/2022  Medication: hydrOXYzine (VISTARIL) 25 MG capsule-APPROVED  Approved Dose/Quantity:   Reference #:     Insurance Company: Medicare Blue - Phone [phone] Fax [phone]  Expected CoPay:       CoPay Card Available:      Foundation Assistance Needed:    Which Pharmacy is filling the prescription (Not needed for infusion/clinic administered): [facility] [address]   Pharmacy Notified: Yes  Patient Notified: No    Pharmacy will notify patient when medication is ready.

## 2021-08-24 NOTE — PROGRESS NOTES
"The patient has been notified of the following:      \"We have found that certain health care needs can be provided without the need for a face to face visit.  This service lets us provide the care you need with a phone conversation.       I will have full access to your [facility] medical record during this entire phone call.   I will be taking notes for your medical record.      Since this is like an office visit, we will bill your insurance company for this service.       There are potential benefits and risks of telephone visits (e.g. limits to patient confidentiality) that differ from in-person visits.?  Confidentiality still applies for telephone services, and nobody will record the visit.  It is important to be in a quiet, private space that is free of distractions (including cell phone or other devices) during the visit.??      If during the course of the call I believe a telephone visit is not appropriate, you will not be charged for this service\"     Consent has been obtained for this service by care team member: Yes     CLINICAL NUTRITION SERVICES - REASSESSMENT NOTE   EVALUATION OF PREVIOUS PLAN OF CARE:   Referring Physician: Elliot   Time spent with patient: 30 minutes.    Current diet: regular foods, soft foods  Current appetite/intake: improved appetite and intake  PEG Tube: Not using; has not used for one month    New expresses his concern about his feeding tube as it's tender at sight, bright red, dime size red, gray green drainage.  He is adamant that he wants his feeding tube out this week versus waiting for his PET scan on 9/3.     Monitoring from previous assessment:   -Food intake - He has been eating 3 small meals/day.  He has added a lot of foods to his diet as well.   He is eating eggs, sausage, tuna and chicken salad, cottage cheese, peaches and pears, baked fish, cooked vegetables.  He continues to eat soft cereals with 2% milk, yogurt, pancakes with syrup/butter and nutrition " shakes  -Liquid meal replacement or supplement - taking 5 Ensure Enlive daily (1750 debby, 100 g protein) - meeting 100% of protein needs and 80% calorie needs  -Weight trends -   Wt Readings from Last 7 Encounters:   21 89 kg (196 lb 3.4 oz)   21 89 kg (196 lb 1.6 oz)   21 90.6 kg (199 lb 12.8 oz)   06/15/21 91.6 kg (202 lb)   06/10/21 93 kg (205 lb)   21 97.9 kg (215 lb 14.4 oz)   21 95.7 kg (211 lb)     Previous Goals:   1. PO intake of ONS/soft, solid foods to meet 100% of estimated nutrition needs   2. Weight maintenance   Evaluation: Met   Previous Nutrition Diagnosis:   No nutrition diagnosis identified at this time   Evaluation: No change   NEW FINDINGS:   ? Infected tube site   CURRENT NUTRITION DIAGNOSIS   No nutrition diagnosis identified at this time   INTERVENTIONS   Recommendations / Nutrition Prescription   1. Suggested reduce ONS to 4/day  2. Increase PO intake of solid, soft, most foods by 400 debby, 20g protein/day   Implementation  Composition of Meals and Snacks, General/healthful diet and Medical Food Supplement - reviewed total calorie, protein and hydration needs as above.    Goals   1. PO intake of ONS/soft, solid foods to meet 100% of estimated nutrition needs   2. Weight maintenance      Follow up/Monitorin week follow-up - one month  Food intake and Weight

## 2021-08-24 NOTE — ED TRIAGE NOTES
Pt presents with redness and drainage from G tube site. Pt would like g tube removed. Pt was diagnosed with tonsilar cancer. Pt able to eat soft foods now.

## 2021-08-24 NOTE — TELEPHONE ENCOUNTER
Central Prior Authorization Team   Phone: [phone]      PA Initiation    Medication: hydrOXYzine (VISTARIL) 25 MG capsule-Initiated  Insurance Company: Medicare Blue - Phone [phone] Fax [phone]  Pharmacy Filling the Rx: [address]   Filling Pharmacy Phone: [phone]  Filling Pharmacy Fax:    Start Date: 8/24/2021

## 2021-08-25 ENCOUNTER — HOME INFUSION (PRE-WILLOW HOME INFUSION) (OUTPATIENT)
Dept: PHARMACY | Facility: CLINIC | Age: 71
End: 2021-08-25
Payer: MEDICARE

## 2021-08-25 ENCOUNTER — PATIENT OUTREACH (OUTPATIENT)
Dept: CARE COORDINATION | Facility: CLINIC | Age: 71
End: 2021-08-25

## 2021-08-25 DIAGNOSIS — Z71.89 OTHER SPECIFIED COUNSELING: ICD-10-CM

## 2021-08-25 NOTE — DISCHARGE INSTRUCTIONS
G-tube removed.  Keep site clean and dry. Change dressings as needed.  Recheck in 1 week as planned.  Return sooner for fevers, increased redness, drainage or pain.

## 2021-08-25 NOTE — ED PROVIDER NOTES
History     Chief Complaint   Patient presents with     Wound Check     G tube . Pt would like it out. Redness and drainage.      HPI  Quan Murphy is a 71 year old male who presents to the emergency department with concern for g-tube site infection. Patient noticed over the last 2-3 days increased purulent discharge from the g-tube site.  Yesterday small amount of redness around the g-tube site. Today the redness increased and now about 2cm around the g-tube site and feels more painful. Denies fever or chills. He has not used the g-tube for nutrition for the last 1 month. Patient tells me that he contacted his oncologist and was instructed to come here for possible g-tube infection to have the g-tube removed and get on antibiotics. Patient states he has been wanting the tube removed for the last few weeks but oncology was being cautious and had wanted him to wait until his next PET scan. He is very adamant that he wants the g-tube removed.    Allergies:  Allergies   Allergen Reactions     Animal Dander      Azithromycin Nausea and Vomiting     Dust Mites      Pollen Extract      Smoke.        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     Priority: Medium     Check 9.21 for f/u Fujioka       Hypomagnesemia 04/13/2021     Priority: Medium     Hiatal hernia 02/17/2020     Priority: Medium     Renal hematoma 10/28/2017     Priority: Medium     Retroperitoneal hematoma 10/28/2017     Priority: Medium     Syncope 10/18/2017     Priority: Medium     S/P ORIF (open reduction internal fixation) fracture 08/03/2017     Priority: Medium     Closed Colles' fracture of right radius with routine healing, subsequent encounter 08/03/2017     Priority: Medium     Status post insertion of drug-eluting stent into left  anterior descending artery for coronary artery disease 01/05/2017     Priority: Medium     Chest pain 12/24/2016     Priority: Medium     Arthropathy 02/04/2014     Priority: Medium     Problem list name updated by automated process. Provider to review       Coronary atherosclerosis      Priority: Medium     Coronary artery disease  Problem list name updated by automated process. Provider to review       Hallux rigidus 07/16/2013     Priority: Medium     Inguinal hernia 06/28/2013     Priority: Medium     Degenerative arthritis of foot 06/28/2013     Priority: Medium     Advanced directives, counseling/discussion 05/24/2013     Priority: Medium     Advance Care Planning:   Receipt of ACP document:  Received: Health Care Directive which was witnessed or notarized on 8-18-08.  Document previously scanned on 7-8-13.  Validation form completed and sent to be scanned.  Code Status needs to be updated to reflect choices in most recent ACP document. Confirmed/documented designated decision maker(s). See permanent comments section of demographics in clinical tab. View document(s) and details by clicking on code status.   Added by Vandana Platt RN, System ACP Coordinator on 7/22/2013.    Advance Care Planning:   ACP Review and Resources Provided:  Reviewed chart for advance care plan.  Quan ALFREDA Murphy has no plan or code status on file however states presence of ACP document. Copy requested. Confirmed code status reflects current choices pending receipt of document/advance care plan review. Confirmed/documented designated decision maker(s). See permanent comments section of demographics in clinical tab.   Added by Krysten Jasmine on 5/24/2013             Hypertension goal BP (blood pressure) < 140/90 06/12/2012     Priority: Medium     Hyperlipidemia LDL goal <130 12/22/2011     Priority: Medium     Anxiety 11/02/2011     Priority: Medium     Allergic conjunctivitis 07/08/2008     Priority: Medium     Sleep disorder due to a  general medical condition, insomnia type 11/17/2006     Priority: Medium     Problem list name updated by automated process. Provider to review       Acute reaction to stress 01/12/2005     Priority: Medium     Problem list name updated by automated process. Provider to review       Chronic maxillary sinusitis 01/12/2005     Priority: Medium     Contracture of palmar fascia 01/12/2005     Priority: Medium     Benign neoplasm of skin of other and unspecified parts of face 01/12/2005     Priority: Medium     Malignant neoplasm of prostate (H) 11/26/2004     Priority: Medium        Past Medical History:    Past Medical History:   Diagnosis Date     Allergic rhinitis      Allergy, unspecified not elsewhere classified      Antiplatelet or antithrombotic long-term use      Anxiety      Arthritis      Chest pain      Chronic sinusitis      Coronary atherosclerosis of unspecified type of vessel, native or graft      Depressive disorder 1995     Gastroesophageal reflux disease 2020     Head injury 1954     Hiatal hernia 2015     History of blood transfusion 12/15/2004     Hyperlipidemia      Hypertension      Inguinal hernia      Kidney problem 10/08/2017     Kidney stones      Malignant neoplasm of prostate (H)      Prostate cancer (H)        Past Surgical History:    Past Surgical History:   Procedure Laterality Date     ARTHRODESIS FOOT  7/23/2013    Procedure: ARTHRODESIS FOOT;  Great Toe Arthrodesis Left Foot;  Surgeon: Ash Gonzalez DPM;  Location: [facility] OR     ARTHRODESIS FOOT  6/10/2014    Procedure: ARTHRODESIS FOOT;  Surgeon: Ash Gonzalez DPM;  Location: [facility] OR     BIOPSY  10/1/2004    dermatologist biopsies     C LAPAROSCOPY, SURGICAL PROSTATECTOMY, RETROPUBIC RADICAL, W/NERVE SPARING  11/30/2004    With full bilateral pelvic lymphadenectomy.  [facility].     C TOTAL KNEE ARTHROPLASTY  05/01/08    Left knee     COLONOSCOPY  10/7/2013    Procedure: COLONOSCOPY;  Colonoscopy;  Surgeon: Mike Fallon MD;   Location: [facility]     ESOPHAGOSCOPY, GASTROSCOPY, DUODENOSCOPY (EGD), COMBINED N/A 2/12/2020    Procedure: ESOPHAGOGASTRODUODENOSCOPY (EGD);  Surgeon: Sam Escobar MD;  Location: [facility]     EXTRACORPOREAL SHOCK WAVE LITHOTRIPSY (ESWL) Bilateral 10/18/2017    Procedure: EXTRACORPOREAL SHOCK WAVE LITHOTRIPSY (ESWL);  BILATERAL EXTRACORPOREAL SHOCKWAVE LITHOTRIPSY ;  Surgeon: Meir Torres MD;  Location: [facility] OR     HC CORRECT BUNION,SIMPLE  08/11/2005    x3     HC REMV TOE BENIGN BONE LESN  08/11/2005     HEAD & NECK SURGERY  1954    From a fall     HERNIORRHAPHY INGUINAL  7/3/2013    Procedure: HERNIORRHAPHY INGUINAL;  Open Repair Inguinal hernia Right with mesh ;  Surgeon: Sam Escobar MD;  Location: [facility]     IR GASTROSTOMY TUBE PERCUTANEOUS PLCMNT  6/7/2021     MOHS MICROGRAPHIC PROCEDURE  08/23/11    ear and chin-[facility]     OPEN REDUCTION INTERNAL FIXATION WRIST Right 7/18/2017    Procedure: OPEN REDUCTION INTERNAL FIXATION WRIST;  Right distal radius open reduction and internal fixation;  Surgeon: Pedro Blanca DO;  Location: [facility]     RECONSTRUCT FOREFOOT WITH METATARSOPHALANGEAL (MTP) FUSION  6/10/2014    Procedure: RECONSTRUCT FOREFOOT WITH METATARSOPHALANGEAL (MTP) FUSION;  Surgeon: Ash Gonzalez DPM;  Location: [facility]     STENT, CORONARY, DEMI       SURGICAL HISTORY OF -   1999/1974    lt knee     SURGICAL HISTORY OF -   10/2004    lithotripsy     SURGICAL HISTORY OF -   11/05    angiogram with stent     VASCULAR SURGERY  11/17/2005    Puncture of iliac artery during and angiogram       Family History:    Family History   Problem Relation Age of Onset     Hypertension Father         Lived to age 87     Connective Tissue Disorder Mother         LUPUS     Heart Disease Mother         Valve replacement     Anxiety Disorder Mother         Lived to age 84     Dementia Mother         Nursing Home (lived to age 86)       Social History:  Marital Status:    [2]  Social History     Tobacco Use     Smoking status: Never Smoker     Smokeless tobacco: Never Used   Substance Use Topics     Alcohol use: Yes     Alcohol/week: 8.3 standard drinks     Types: 10 Cans of beer per week     Comment: 2 beers a day      Drug use: No        Medications:    cephALEXin (KEFLEX) 500 MG capsule  amoxicillin-clavulanate (AUGMENTIN-ES) 600-42.9 MG/5ML suspension  ASPIRIN ADULT LOW STRENGTH 81 MG EC tablet  cevimeline (EVOXAC) 30 MG capsule  citalopram (CELEXA) 40 MG tablet  dronabinol (MARINOL) 2.5 MG capsule  fluconazole (DIFLUCAN) 200 MG tablet  [START ON 8/25/2021] HYDROcodone-acetaminophen (NORCO)  MG per tablet  hydrOXYzine (VISTARIL) 25 MG capsule  methadone (DOLOPHINE) 5 MG tablet  metoprolol succinate ER (TOPROL-XL) 50 MG 24 hr tablet  naloxone (NARCAN) 4 MG/0.1ML nasal spray  nitroGLYcerin (NITROSTAT) 0.4 MG sublingual tablet  omeprazole (PRILOSEC) 40 MG DR capsule  prochlorperazine (COMPAZINE) 5 MG tablet  rosuvastatin (CRESTOR) 40 MG tablet  testosterone (ANDROGEL 1.62 % PUMP) 20.25 MG/ACT gel  [START ON 8/26/2021] zolpidem ER (AMBIEN CR) 12.5 MG CR tablet          Review of Systems  As mentioned above in the history present illness. All other systems were reviewed and are negative.    Physical Exam   BP: (!) 166/85  Pulse: 72  Temp: 98.5  F (36.9  C)  Resp: 20  Weight: 88.9 kg (196 lb)  SpO2: 96 %      Physical Exam    GENERAL APPEARANCE: alert and oriented. NAD.   RESP: lungs clear to auscultation - no rales, rhonchi or wheezes  CV: regular rates and rhythm, normal S1 S2, no murmur noted  ABDOMEN:  soft, tenderness to the g-tube site, no HSM or masses and bowel sounds normal  SKIN: brown/yellow drainage from g-tube site noted on dressing. 2cm area of erythema around the g-tube site.    ED Course        Procedures          Removal of G-tube:  --g-tube balloon deflated using 20 ml syringe. 12 ml of fluid removed from the balloon.  Tube easily removed without any resistance.  Site cleansed with normally saline and dressing applied.         No results found. However, due to the size of the patient record, not all encounters were searched. Please check Results Review for a complete set of results.    Medications - No data to display    Assessments & Plan (with Medical Decision Making)   Keflex 500 mg four times a day for 7 days.  G-tube removed.  Keep site clean and dry. Change dressings as needed.  Recheck in 1 week as planned.  Return sooner for fevers, increased redness, drainage or pain.ve reviewed the nursing notes.    I have reviewed the findings, diagnosis, plan and need for follow up with the patient.      Discharge Medication List as of 8/24/2021  7:10 PM      START taking these medications    Details   cephALEXin (KEFLEX) 500 MG capsule Take 1 capsule (500 mg) by mouth 4 times daily for 7 days, Disp-28 capsule, R-0, E-Prescribe             Final diagnoses:   Skin infection at gastrostomy tube site (H)       8/24/2021   [facility] EMERGENCY DEPT     Mumtaz, MARCY Joyce CNP  08/24/21 1935

## 2021-08-25 NOTE — PROGRESS NOTES
Clinic Care Coordination Contact  Community Health Worker Initial Outreach    Patient accepts CC: No, patient declines at this time.    [facility]: Post-Discharge Note  SITUATION                                                      Admission:    Admission Date: 08/24/21   Reason for Admission: Skin infection at gastrostomy tube site, removal of g-tube  Discharge:   Discharge Date: 08/24/21  Discharge Diagnosis: Skin infection at gastrostomy tube site, removal of g-tube    BACKGROUND                                                      Quan Murphy is a 71 year old male who presents to the emergency department with concern for g-tube site infection.    ASSESSMENT      Enrollment  Primary Care Care Coordination Status: Declined (Patient may be interested in the future.)    Discharge Assessment  How are you doing now that you are home?: much better  How are your symptoms? (Red Flag symptoms escalate to triage hotline per guidelines): Improved  Do you feel your condition is stable enough to be safe at home until your provider visit?: Yes  Does the patient have their discharge instructions? : Yes  Does the patient have questions regarding their discharge instructions? : No  Were you started on any new medications or were there changes to any of your previous medications? : Yes  Does the patient have all of their medications?: Yes  Do you have questions regarding any of your medications? : No  Do you have all of your needed medical supplies or equipment (DME)?  (i.e. oxygen tank, CPAP, cane, etc.): Yes  Discharge follow-up appointment scheduled within 14 calendar days? : Yes  Discharge Follow Up Appointment Scheduled with?: Specialty Care Provider    Post-op (Clinicians Only)  Did the patient have surgery or a procedure: No (g-tube removal)  Fever: No  Chills: No  Eating & Drinking: eating and drinking without complaints/concerns  PO Intake: regular diet  Bowel Function: normal  Date of last BM:  08/24/21  Urinary Status: voiding without complaint/concerns      PLAN                                                      Outpatient Plan:      Recheck in 1 week as planned.  Return sooner for fevers, increased redness, drainage or pain.    Future Appointments   Date Time Provider Department [place]   8/31/2021  1:30 PM [facility] COVID VACCINE MODERNA [facility]   9/3/2021 12:00 PM UUPET1 [facility]   9/3/2021  2:30 PM  [facility] LAB DRAW [facility]   9/3/2021  3:00 PM Ahmet Robbins MD [facility]   9/3/2021  3:00 PM Ligia Fang MD [facility]   9/3/2021  3:45 PM Shanthi Schrader MD [facility]   9/28/2021 10:00 AM Crystal Marino, RD [facility]         For any urgent concerns, please contact our 24 hour nurse triage line: [phone] ([phone])        Kristal Montemayor  Community Health Worker  [facility]  Ph: [phone]

## 2021-08-28 ENCOUNTER — HEALTH MAINTENANCE LETTER (OUTPATIENT)
Age: 71
End: 2021-08-28

## 2021-08-31 ENCOUNTER — IMMUNIZATION (OUTPATIENT)
Dept: FAMILY MEDICINE | Facility: CLINIC | Age: 71
End: 2021-08-31
Payer: MEDICARE

## 2021-08-31 PROCEDURE — 91301 PR COVID VAC MODERNA 100 MCG/0.5 ML IM: CPT

## 2021-08-31 PROCEDURE — 0013A PR COVID VAC MODERNA 100 MCG/0.5 ML IM: CPT

## 2021-09-01 DIAGNOSIS — B37.0 THRUSH: Primary | ICD-10-CM

## 2021-09-01 RX ORDER — NYSTATIN 100000/ML
500000 SUSPENSION, ORAL (FINAL DOSE FORM) ORAL 4 TIMES DAILY
Qty: 240 ML | Refills: 0 | Status: SHIPPED | OUTPATIENT
Start: 2021-09-01 | End: 2022-02-02

## 2021-09-02 ENCOUNTER — PATIENT OUTREACH (OUTPATIENT)
Dept: PALLIATIVE CARE | Facility: CLINIC | Age: 71
End: 2021-09-02

## 2021-09-02 DIAGNOSIS — G89.3 NEOPLASM RELATED PAIN: ICD-10-CM

## 2021-09-02 DIAGNOSIS — C09.9 SQUAMOUS CELL CARCINOMA OF LEFT TONSIL (H): ICD-10-CM

## 2021-09-02 RX ORDER — HYDROCODONE BITARTRATE AND ACETAMINOPHEN 10; 325 MG/1; MG/1
1-2 TABLET ORAL EVERY 4 HOURS PRN
Qty: 24 TABLET | Refills: 0 | Status: SHIPPED | OUTPATIENT
Start: 2021-09-06 | End: 2021-09-03

## 2021-09-02 NOTE — PROGRESS NOTES
Received VM from patient requesting refill of norco. He states he has a couple days left. Notes he will finish last methadone tomorrow. He states that he would like to respectfully ask the MD to consider keeping him on #8 tabs norco for now.     Has had continued pain in throat/tongue with a yeast infection, which has made it more tough to eat. Easier to eat when pain is controlled.      Attempted to call patient verify how many tabs he has left as I show refill due next Tuesday 9/7 - left  for call back when he is able.     Last refill: 8/25/2021 (#112 for 14 day supply)  Last office visit: 8/18/2021  Scheduled for follow up 9/8/2021     Will route request to MD for review.     Reviewed [place]  Report.

## 2021-09-02 NOTE — PROGRESS NOTES
Received VM from patient again wanting to give us med counts. Reports he has #25 tabs oxycodone left - would run out Monday. Also reiterates he would like to stay on #8 tabs per day per MD agrees. Throat still quite tender and sore, tough when eating.     Has 1/2 tab methadone left for tomorrow.

## 2021-09-02 NOTE — PROGRESS NOTES
Patient returned my call - thinks he only has enough last to Sunday, hoping he could get meds before Tuesday. Asks me to call to let him know when script can be picked up.

## 2021-09-03 ENCOUNTER — OFFICE VISIT (OUTPATIENT)
Dept: OTOLARYNGOLOGY | Facility: CLINIC | Age: 71
End: 2021-09-03
Payer: MEDICARE

## 2021-09-03 ENCOUNTER — THERAPY VISIT (OUTPATIENT)
Dept: SPEECH THERAPY | Facility: CLINIC | Age: 71
End: 2021-09-03
Payer: MEDICARE

## 2021-09-03 ENCOUNTER — HOSPITAL ENCOUNTER (OUTPATIENT)
Dept: PET IMAGING | Facility: CLINIC | Age: 71
Setting detail: NUCLEAR MEDICINE
Discharge: HOME | End: 2021-09-03
Attending: OTOLARYNGOLOGY | Admitting: OTOLARYNGOLOGY
Payer: MEDICARE

## 2021-09-03 ENCOUNTER — ONCOLOGY VISIT (OUTPATIENT)
Dept: ONCOLOGY | Facility: CLINIC | Age: 71
End: 2021-09-03
Attending: INTERNAL MEDICINE
Payer: MEDICARE

## 2021-09-03 ENCOUNTER — HOSPITAL ENCOUNTER (OUTPATIENT)
Dept: PET IMAGING | Facility: CLINIC | Age: 71
End: 2021-09-03
Attending: OTOLARYNGOLOGY
Payer: MEDICARE

## 2021-09-03 ENCOUNTER — APPOINTMENT (OUTPATIENT)
Dept: LAB | Facility: CLINIC | Age: 71
End: 2021-09-03
Attending: INTERNAL MEDICINE
Payer: MEDICARE

## 2021-09-03 VITALS — HEIGHT: 68 IN | WEIGHT: 196.21 LBS | BODY MASS INDEX: 29.74 KG/M2 | TEMPERATURE: 96.3 F

## 2021-09-03 VITALS
SYSTOLIC BLOOD PRESSURE: 114 MMHG | TEMPERATURE: 98.7 F | WEIGHT: 196 LBS | BODY MASS INDEX: 29.37 KG/M2 | HEART RATE: 66 BPM | DIASTOLIC BLOOD PRESSURE: 76 MMHG | RESPIRATION RATE: 16 BRPM | OXYGEN SATURATION: 98 %

## 2021-09-03 DIAGNOSIS — R13.12 OROPHARYNGEAL DYSPHAGIA: Primary | ICD-10-CM

## 2021-09-03 DIAGNOSIS — C09.9 SQUAMOUS CELL CARCINOMA OF TONSIL (H): ICD-10-CM

## 2021-09-03 DIAGNOSIS — C09.9 SQUAMOUS CELL CARCINOMA OF LEFT TONSIL (H): Primary | ICD-10-CM

## 2021-09-03 DIAGNOSIS — E03.4 HYPOTHYROIDISM DUE TO ACQUIRED ATROPHY OF THYROID: ICD-10-CM

## 2021-09-03 LAB
ALBUMIN SERPL-MCNC: 3.4 G/DL (ref 3.4–5)
ALP SERPL-CCNC: 100 U/L (ref 40–150)
ALT SERPL W P-5'-P-CCNC: 52 U/L (ref 0–70)
ANION GAP SERPL CALCULATED.3IONS-SCNC: 5 MMOL/L (ref 3–14)
AST SERPL W P-5'-P-CCNC: 27 U/L (ref 0–45)
BASOPHILS # BLD AUTO: 0 10E3/UL (ref 0–0.2)
BASOPHILS NFR BLD AUTO: 0 %
BILIRUB SERPL-MCNC: 0.5 MG/DL (ref 0.2–1.3)
BUN SERPL-MCNC: 20 MG/DL (ref 7–30)
CALCIUM SERPL-MCNC: 9.4 MG/DL (ref 8.5–10.1)
CHLORIDE BLD-SCNC: 98 MMOL/L (ref 94–109)
CO2 SERPL-SCNC: 32 MMOL/L (ref 20–32)
CREAT SERPL-MCNC: 1.08 MG/DL (ref 0.66–1.25)
EOSINOPHIL # BLD AUTO: 0 10E3/UL (ref 0–0.7)
EOSINOPHIL NFR BLD AUTO: 1 %
ERYTHROCYTE [DISTWIDTH] IN BLOOD BY AUTOMATED COUNT: 14.4 % (ref 10–15)
GFR SERPL CREATININE-BSD FRML MDRD: 69 ML/MIN/1.73M2
GLUCOSE BLD-MCNC: 107 MG/DL (ref 70–99)
HCT VFR BLD AUTO: 44.1 % (ref 40–53)
HGB BLD-MCNC: 13.7 G/DL (ref 13.3–17.7)
IMM GRANULOCYTES # BLD: 0 10E3/UL
IMM GRANULOCYTES NFR BLD: 0 %
LYMPHOCYTES # BLD AUTO: 0.4 10E3/UL (ref 0.8–5.3)
LYMPHOCYTES NFR BLD AUTO: 11 %
MAGNESIUM SERPL-MCNC: 2.2 MG/DL (ref 1.6–2.3)
MCH RBC QN AUTO: 29.5 PG (ref 26.5–33)
MCHC RBC AUTO-ENTMCNC: 31.1 G/DL (ref 31.5–36.5)
MCV RBC AUTO: 95 FL (ref 78–100)
MONOCYTES # BLD AUTO: 0.5 10E3/UL (ref 0–1.3)
MONOCYTES NFR BLD AUTO: 12 %
NEUTROPHILS # BLD AUTO: 3.1 10E3/UL (ref 1.6–8.3)
NEUTROPHILS NFR BLD AUTO: 76 %
NRBC # BLD AUTO: 0 10E3/UL
NRBC BLD AUTO-RTO: 0 /100
PLATELET # BLD AUTO: 206 10E3/UL (ref 150–450)
POTASSIUM BLD-SCNC: 4.4 MMOL/L (ref 3.4–5.3)
PROT SERPL-MCNC: 7.4 G/DL (ref 6.8–8.8)
RBC # BLD AUTO: 4.64 10E6/UL (ref 4.4–5.9)
SODIUM SERPL-SCNC: 135 MMOL/L (ref 133–144)
T4 FREE SERPL-MCNC: 0.4 NG/DL (ref 0.76–1.46)
TSH SERPL DL<=0.005 MIU/L-ACNC: 42.18 MU/L (ref 0.4–4)
WBC # BLD AUTO: 4.1 10E3/UL (ref 4–11)

## 2021-09-03 PROCEDURE — 85025 COMPLETE CBC W/AUTO DIFF WBC: CPT

## 2021-09-03 PROCEDURE — 36415 COLL VENOUS BLD VENIPUNCTURE: CPT

## 2021-09-03 PROCEDURE — 70491 CT SOFT TISSUE NECK W/DYE: CPT

## 2021-09-03 PROCEDURE — 92526 ORAL FUNCTION THERAPY: CPT | Mod: GN | Performed by: SPEECH-LANGUAGE PATHOLOGIST

## 2021-09-03 PROCEDURE — 84443 ASSAY THYROID STIM HORMONE: CPT

## 2021-09-03 PROCEDURE — 70491 CT SOFT TISSUE NECK W/DYE: CPT | Mod: 26 | Performed by: STUDENT IN AN ORGANIZED HEALTH CARE EDUCATION/TRAINING PROGRAM

## 2021-09-03 PROCEDURE — 78816 PET IMAGE W/CT FULL BODY: CPT | Mod: 26 | Performed by: STUDENT IN AN ORGANIZED HEALTH CARE EDUCATION/TRAINING PROGRAM

## 2021-09-03 PROCEDURE — A9552 F18 FDG: HCPCS | Performed by: OTOLARYNGOLOGY

## 2021-09-03 PROCEDURE — 80053 COMPREHEN METABOLIC PANEL: CPT

## 2021-09-03 PROCEDURE — 84439 ASSAY OF FREE THYROXINE: CPT

## 2021-09-03 PROCEDURE — 250N000011 HC RX IP 250 OP 636: Performed by: OTOLARYNGOLOGY

## 2021-09-03 PROCEDURE — G0463 HOSPITAL OUTPT CLINIC VISIT: HCPCS | Mod: 25

## 2021-09-03 PROCEDURE — 74177 CT ABD & PELVIS W/CONTRAST: CPT

## 2021-09-03 PROCEDURE — 99214 OFFICE O/P EST MOD 30 MIN: CPT | Mod: 25 | Performed by: OTOLARYNGOLOGY

## 2021-09-03 PROCEDURE — 74177 CT ABD & PELVIS W/CONTRAST: CPT | Mod: 26 | Performed by: STUDENT IN AN ORGANIZED HEALTH CARE EDUCATION/TRAINING PROGRAM

## 2021-09-03 PROCEDURE — 31575 DIAGNOSTIC LARYNGOSCOPY: CPT | Performed by: OTOLARYNGOLOGY

## 2021-09-03 PROCEDURE — 99214 OFFICE O/P EST MOD 30 MIN: CPT | Performed by: INTERNAL MEDICINE

## 2021-09-03 PROCEDURE — 83735 ASSAY OF MAGNESIUM: CPT

## 2021-09-03 PROCEDURE — 78816 PET IMAGE W/CT FULL BODY: CPT | Mod: PS,MG

## 2021-09-03 PROCEDURE — 343N000001 HC RX 343: Performed by: OTOLARYNGOLOGY

## 2021-09-03 PROCEDURE — G1004 CDSM NDSC: HCPCS | Mod: GC | Performed by: STUDENT IN AN ORGANIZED HEALTH CARE EDUCATION/TRAINING PROGRAM

## 2021-09-03 PROCEDURE — 71260 CT THORAX DX C+: CPT | Mod: 26 | Performed by: STUDENT IN AN ORGANIZED HEALTH CARE EDUCATION/TRAINING PROGRAM

## 2021-09-03 RX ORDER — HYDROCODONE BITARTRATE AND ACETAMINOPHEN 10; 325 MG/1; MG/1
1-2 TABLET ORAL EVERY 4 HOURS PRN
Qty: 24 TABLET | Refills: 0 | Status: SHIPPED | OUTPATIENT
Start: 2021-09-05 | End: 2021-09-08

## 2021-09-03 RX ORDER — LEVOTHYROXINE SODIUM 137 UG/1
137 TABLET ORAL DAILY
Qty: 30 TABLET | Refills: 11 | Status: SHIPPED | OUTPATIENT
Start: 2021-09-03 | End: 2022-02-02

## 2021-09-03 RX ORDER — IOPAMIDOL 755 MG/ML
50-135 INJECTION, SOLUTION INTRAVASCULAR ONCE
Status: COMPLETED | OUTPATIENT
Start: 2021-09-03 | End: 2021-09-03

## 2021-09-03 RX ADMIN — IOPAMIDOL 120 ML: 755 INJECTION, SOLUTION INTRAVENOUS at 12:45

## 2021-09-03 RX ADMIN — FLUDEOXYGLUCOSE F-18 11.68 MCI.: 500 INJECTION, SOLUTION INTRAVENOUS at 11:59

## 2021-09-03 ASSESSMENT — MIFFLIN-ST. JEOR: SCORE: 1624.5

## 2021-09-03 ASSESSMENT — PAIN SCALES - GENERAL
PAINLEVEL: MILD PAIN (3)
PAINLEVEL: NO PAIN (0)

## 2021-09-03 NOTE — PROGRESS NOTES
Dear Dr. Christensen:    I had the pleasure of seeing Quan Murphy in follow-up today at the [facility].     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 The patient has a history of foreign body sensation on the left side starting a few months ago. He tried gargling and using a neti pot with no improvement. He also developed left neck discomfort which radiates into his head. He saw primary care on 3/4/2021. He was treated with a course of antibiotics with no improvement. He saw seen by Dr Christensen on 3/22/2021. At that time he had an enlarged left tonsil. A biopsy was performed at that time which was consistent with a p16+ SCC. He had a CT scan on 3/23/2021 which showed a 2.9 x 2.7 x 3.5 cm left tonsil cancer, involving the soft palate, left level II and III lymphadenopathy. He had a PET scan performed today which showed the primary tumor in the tonsil and soft palate, left-sided lymphadenopathy with SHAYNE present (3.4 x 2.0 cm, 1.8 x 1.8 cm), right-sided node (1.4 x 0.9 cm) with FDG activity, no distant disease.    He was treated with definitive chemoradiation from 4/28/2021 to 6/12/2021. He had a total of 6996 cGy under the care of Dr Robbins. He had weekly cisplatin for a total of 5 cycles under the care of Dr Schrader. He had a difficult time with pain control during his treatment secondary to his chronic pain medication use and pain medications were managed by palliative care. He was hospitalized from 6/2/2021 to 6/9/2021 for failure to thrive. He had reactive PEG tube placement on 6/7/2021 due to inability to maintain adequate nutrition and intolerance of the idea of an NG tube.        Interval history:  He comes in today for follow-up. He was last seen in July 2021.  He comes in today with his PET scan and thyroid labs.  His PET scan was negative for recurrent disease.  His TSH is elevated with a low T4.  He was  seen in the emergency department in August 2021 with concerns for a PEG site infection.  He did have his PEG tube removed at that time.  He says that he continues to have pain with swallowing of solid food.  He has burning of his mouth with certain foods.  He does not have any issues with drinking of liquids.  He continues to struggle with a dry mouth.  He thinks he is getting some benefit from the cevimeline.  He has dropped his weight a little bit, is out of 196 pounds.  He continues to feel like his left ear is plugged.  He is using salt and soda rinses.  He is working towards transitioning his pain regimen controlled to his primary care from palliative care.      MEDICATIONS:     Current Outpatient Medications   Medication Sig Dispense Refill     amoxicillin-clavulanate (AUGMENTIN-ES) 600-42.9 MG/5ML suspension Take 5 mLs (600 mg) by mouth 2 times daily 200 mL 0     ASPIRIN ADULT LOW STRENGTH 81 MG EC tablet TAKE ONE TABLET BY MOUTH ONCE DAILY 90 tablet 1     cevimeline (EVOXAC) 30 MG capsule Take 1 capsule (30 mg) by mouth 3 times daily 120 capsule 11     citalopram (CELEXA) 40 MG tablet TAKE ONE TABLET BY MOUTH ONCE DAILY 30 tablet 8     dronabinol (MARINOL) 2.5 MG capsule Take 1 capsule (2.5 mg) by mouth 2 times daily (before meals) 28 capsule 3     fluconazole (DIFLUCAN) 200 MG tablet Take 1 tablet (200 mg) by mouth daily 7 tablet 0     [START ON 9/5/2021] HYDROcodone-acetaminophen (NORCO)  MG per tablet Take 1-2 tablets by mouth every 4 hours as needed for severe pain . Take maximum of 8 tabs a day. 24 tablet 0     hydrOXYzine (VISTARIL) 25 MG capsule TAKE 1 CAPSULE (25 MG) BY MOUTH 4 TIMES DAILY AS NEEDED FOR ANXIETY 120 capsule 4     levothyroxine (SYNTHROID/LEVOTHROID) 137 MCG tablet Take 1 tablet (137 mcg) by mouth daily 30 tablet 11     methadone (DOLOPHINE) 5 MG tablet Take 5 mg by mouth 2 times daily TAke 1/2 tab 3 times a day for a week, then twice a day for a week, then once a day for a week,  then stop.  28 tablet 0     metoprolol succinate ER (TOPROL-XL) 50 MG 24 hr tablet Take 1 tablet (50 mg) by mouth daily 90 tablet 3     naloxone (NARCAN) 4 MG/0.1ML nasal spray Spray 1 spray (4 mg) into one nostril alternating nostrils as needed for opioid reversal every 2-3 minutes until assistance arrives 0.2 mL      nitroGLYcerin (NITROSTAT) 0.4 MG sublingual tablet For chest pain place 1 tablet under the tongue every 5 minutes for 3 doses. If symptoms persist 5 minutes after 1st dose call 911. 25 tablet 0     nystatin (MYCOSTATIN) 097734 UNIT/ML suspension Take 5 mLs (500,000 Units) by mouth 4 times daily 240 mL 0     omeprazole (PRILOSEC) 40 MG DR capsule Take 1 capsule (40 mg) by mouth daily 90 capsule 3     prochlorperazine (COMPAZINE) 5 MG tablet TAKE 1 TABLET BY MOUTH EVERY 6 HOURS AS NEEDED FOR NAUSEA OR VOMITING       rosuvastatin (CRESTOR) 40 MG tablet Take 1 tablet (40 mg) by mouth daily 90 tablet 0     testosterone (ANDROGEL 1.62 % PUMP) 20.25 MG/ACT gel testosterone 20.25 mg/1.25 gram (1.62 %) transdermal gel pump       zolpidem ER (AMBIEN CR) 12.5 MG CR tablet Take 1 tablet (12.5 mg) by mouth nightly as needed for sleep 30 tablet 3       ALLERGIES:    Allergies   Allergen Reactions     Animal Dander      Azithromycin Nausea and Vomiting     Dust Mites      Pollen Extract      Smoke.        HABITS/SOCIAL HISTORY:   Nonsmoker. Chewing tobacco use occasionally when fishing. . Drink 2-3 beers per day.   Lives with wife (nurse).   Retired .     Social History     Socioeconomic History     Marital status:      Spouse name: Alessandra     Number of children: 2     Years of education: Not on file     Highest education level: Not on file   Occupational History     Occupation: Retired   Tobacco Use     Smoking status: Never Smoker     Smokeless tobacco: Never Used   Substance and Sexual Activity     Alcohol use: Yes     Alcohol/week: 8.3 standard drinks     Types: 10 Cans of beer per  week     Comment: 2 beers a day      Drug use: No     Sexual activity: Yes     Partners: Female     Birth control/protection: Post-menopausal, Female Surgical   Other Topics Concern      Service Not Asked     Blood Transfusions Not Asked     Caffeine Concern Not Asked     Occupational Exposure Not Asked     Hobby Hazards Not Asked     Sleep Concern Not Asked     Stress Concern Not Asked     Weight Concern Not Asked     Special Diet Not Asked     Back Care Not Asked     Exercise Not Asked     Bike Helmet Not Asked     Seat Belt Not Asked     Self-Exams Not Asked     Parent/sibling w/ CABG, MI or angioplasty before 65F 55M? No   Social History Narrative     Not on file     Social Determinants of Health     Financial Resource Strain:      Difficulty of Paying Living Expenses:    Food Insecurity:      Worried About Running Out of Food in the Last Year:      Ran Out of Food in the Last Year:    Transportation Needs:      Lack of Transportation (Medical):      Lack of Transportation (Non-Medical):    Physical Activity:      Days of Exercise per Week:      Minutes of Exercise per Session:    Stress:      Feeling of Stress :    Social Connections:      Frequency of Communication with Friends and Family:      Frequency of Social Gatherings with Friends and Family:      Attends [employer] Services:      Active Member of Clubs or Organizations:      Attends Club or Organization Meetings:      Marital Status:    Intimate Partner Violence:      Fear of Current or Ex-Partner:      Emotionally Abused:      Physically Abused:      Sexually Abused:        PAST MEDICAL HISTORY:   Past Medical History:   Diagnosis Date     Allergic rhinitis      Allergy, unspecified not elsewhere classified     Seasonal allergies, pollen, dust, smoke and animals     Antiplatelet or antithrombotic long-term use      Anxiety      Arthritis      Chest pain      Chronic sinusitis      Coronary atherosclerosis of unspecified type of vessel, native  or graft     Coronary artery disease     Depressive disorder 1995     Gastroesophageal reflux disease 2020    Cleared with medication     Head injury 1954     Hiatal hernia 2015    Right Side     History of blood transfusion 12/15/2004    Prostate Surgery - My own blood     Hyperlipidemia      Hypertension      Inguinal hernia      Kidney problem 10/08/2017    Lithotripsy     Kidney stones      Malignant neoplasm of prostate ([facility])     Prostate cancer     Prostate cancer ([facility])         PAST SURGICAL HISTORY:   Past Surgical History:   Procedure Laterality Date     ARTHRODESIS FOOT  7/23/2013    Procedure: ARTHRODESIS FOOT;  Great Toe Arthrodesis Left Foot;  Surgeon: Ash Gonzalez DPM;  Location: [facility]     ARTHRODESIS FOOT  6/10/2014    Procedure: ARTHRODESIS FOOT;  Surgeon: Ash Gonzalez DPM;  Location: [facility] OR     BIOPSY  10/1/2004    dermatologist biopsies     C LAPAROSCOPY, SURGICAL PROSTATECTOMY, RETROPUBIC RADICAL, W/NERVE SPARING  11/30/2004    With full bilateral pelvic lymphadenectomy.  [facility].     C TOTAL KNEE ARTHROPLASTY  05/01/08    Left knee     COLONOSCOPY  10/7/2013    Procedure: COLONOSCOPY;  Colonoscopy;  Surgeon: Mike Fallon MD;  Location: [facility]     ESOPHAGOSCOPY, GASTROSCOPY, DUODENOSCOPY (EGD), COMBINED N/A 2/12/2020    Procedure: ESOPHAGOGASTRODUODENOSCOPY (EGD);  Surgeon: Sam Escobar MD;  Location: [facility]     EXTRACORPOREAL SHOCK WAVE LITHOTRIPSY (ESWL) Bilateral 10/18/2017    Procedure: EXTRACORPOREAL SHOCK WAVE LITHOTRIPSY (ESWL);  BILATERAL EXTRACORPOREAL SHOCKWAVE LITHOTRIPSY ;  Surgeon: Meir Torres MD;  Location: [facility] OR     HC CORRECT BUNION,SIMPLE  08/11/2005    x3     HC REMV TOE BENIGN BONE LESN  08/11/2005     HEAD & NECK SURGERY  1954    From a fall     HERNIORRHAPHY INGUINAL  7/3/2013    Procedure: HERNIORRHAPHY INGUINAL;  Open Repair Inguinal hernia Right with mesh ;  Surgeon: Sam Escobar MD;  Location: [facility]     IR GASTROSTOMY TUBE PERCUTANEOUS PLCMNT   "6/7/2021     MOHS MICROGRAPHIC PROCEDURE  08/23/11    ear and chin-[facility]     OPEN REDUCTION INTERNAL FIXATION WRIST Right 7/18/2017    Procedure: OPEN REDUCTION INTERNAL FIXATION WRIST;  Right distal radius open reduction and internal fixation;  Surgeon: Pedro Blanca DO;  Location: [facility]     RECONSTRUCT FOREFOOT WITH METATARSOPHALANGEAL (MTP) FUSION  6/10/2014    Procedure: RECONSTRUCT FOREFOOT WITH METATARSOPHALANGEAL (MTP) FUSION;  Surgeon: Ash Gonzalez DPM;  Location: [facility]     STENT, CORONARY, DEMI       SURGICAL HISTORY OF -   1999/1974    lt knee     SURGICAL HISTORY OF -   10/2004    lithotripsy     SURGICAL HISTORY OF -   11/05    angiogram with stent     VASCULAR SURGERY  11/17/2005    Puncture of iliac artery during and angiogram       FAMILY HISTORY:    Family History   Problem Relation Age of Onset     Hypertension Father         Lived to age 87     Connective Tissue Disorder Mother         LUPUS     Heart Disease Mother         Valve replacement     Anxiety Disorder Mother         Lived to age 84     Dementia Mother         Nursing Home (lived to age 86)       REVIEW OF SYSTEMS:  12 point ROS was negative other than the symptoms noted above in the HPI.  Patient Supplied Answers to Review of Systems  [facility] ENT ROS 7/25/2021   Constitutional Problems with sleep   Neurology -   Psychology -   Ears, Nose, Throat Nasal congestion or drainage, Sore throat, Trouble swallowing   Allergy/Immunology Allergies or hay fever   Hematologic -         PHYSICAL EXAMINATION:   Temp (!) 96.3  F (35.7  C) (Temporal)   Ht 1.727 m (5' 8\")   Wt 89 kg (196 lb 3.4 oz)   BMI 29.83 kg/m     Appearance:   normal; NAD, age-appropriate appearance, well-developed, normal habitus   Communication:   normal; communicates verbally, normal voice quality, intermittent stutter   Head/Face:   inspection -  Normal; no scars or visible lesions   Salivary glands -  Normal size, no tenderness, swelling, or " palpable masses   Facial strength -  Normal and symmetric    Skin:  normal, no rash   Ears:  auricle (AD) -  normal  EAC (AD) -  normal  TM (AD) -  Normal, no effusion  auricle (AS) -  normal  EAC (AS) -  normal  TM (AS) -  Serous effusion  Normal clinical speech reception   Nose:  Ext. inspection -  Normal  Internal Inspection -  Normal mucosa, septum, and turbinates   Nasopharynx:  normal mucosa, no masses   Oral Cavity:  lips -  Normal mucosa, oral competence, and stoma size   Age-appropriate dentition, healthy gingival mucosa   Hard palate, buccal, floor of mouth mucosa with xerostomia   Tongue - normal movement, no masses or mucosal lesions   Oropharynx:  There are radiation changes to the mucosa throughout.  There is residual mucositis along the left tonsillar fossa, left base of tongue, left lateral pharyngeal wall, spares the vallecula  There are no secretions in the vallecula  Palpation is limited of the oropharynx due to gag reflex  No obvious residual disease   Hypopharynx:  Normal pyriform sinus    No pooled secretions    Larynx:  Epiglottis, false vocal cords, true vocal cords normal in appearance, bilaterally mobile cords   The left arytenoid and AE fold have radiation edema that is not obstructive   Neck: No palpable masses  Lymphedema present   Lymphatic:  No palpable masses   Cardiovascular:  warm, pink, well-perfused extremities without swelling, tenderness, or edema   Respiratory:  Normal respiratory effort, no stridor   Neuro/Psych.:  mood/affect -  normal  mental status -  normal         PROCEDURES:   Flexible fiberoptic laryngoscopy: Scope exam was indicated due to tonsil cancer. Verbal consent was obtained. The nasal cavity was prepped with an aerosolized solution of topical anesthetic and vasoconstrictive agent. The scope was passed through the anterior nasal cavity and advanced. Inspection of the nasopharynx revealed no gross abnormality.  There is mucositis along the left lateral pharyngeal  wall and extending into the left base of tongue.  There is no obvious residual mass in the left tonsil/oropharynx.  The epiglottis has very mild thickening.  There is radiation edema of the left AE fold and left arytenoid.  This is not obstructive.  The vocal cords are mobile bilaterally.  The piriform sinuses are clear. The airway is patent. Procedure tolerated well with no immediate complications noted.                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 He completed definitive chemoradiation on 6/12/2021.    He continues to recover from his treatment.  He has no evidence of residual disease.  He does have mucositis present in the left oropharynx along the base of tongue and tonsil.  This is consistent with his PET scan findings.    He was cleared to see the dentist for routine cleaning.    He was seen by SLP team today who spent considerable time working with him again about his oral nutrition and swallowing exercises.    A lymphedema referral was placed.    He had his thyroid labs checked today which show that his TSH is significantly elevated and his T4 is low.  We did start him on some Synthroid today.  We will recheck his labs in a couple of months when he returns to clinic.  We discussed that having his thyroid replacement may help with how he is feeling overall.    He had his PET scan today which appears to be negative for recurrent disease.  We will plan on repeating his imaging in March 2022.    I would like to see him back in clinic in 2 months.  He will return to multidisciplinary clinic in 4 months.    Thank you very much for the opportunity to participate in the care of your patient.      Ligia Fang MD, M.D.  Otolaryngology- Head & Neck Surgery      This  note was dictated with voice recognition software and then edited. Please excuse any unintentional errors.         CC:  Ahmet Robbins MD  Department of Radiation Oncology  [facility]      Too Christensen MD  [address] [place] [address]      Shanthi Schrader MD  Department of Medical Oncology  [facility]

## 2021-09-03 NOTE — PROGRESS NOTES
Department of Radiation Oncology  [facility]  [address] [place] [address]  [phone]       Radiation Oncology Follow-up Visit  September 3, 2021      Quan Murphy  MRN: [id number]   : 1950     DISEASE TREATED:   cT2 N2 M0 p16 positive squamous cell carcinoma of the left tonsil    RADIATION THERAPY DELIVERED:   Left oropharynx and neck: 6996 cGy in 33 fractions, from 2021 - 2021    SYSTEMIC THERAPY:  Cisplatin 40 mg/m  weekly x5 weeks    INTERVAL SINCE COMPLETION OF RADIATION THERAPY:   3 months    SUBJECTIVE:   Quan Murphy is a 70 year old male with a PMH significant for a stage II p16-positive squamous cell carcinoma of the left tonsil diagnosed in 3/2021 after he presented with a 1 month history of left-sided otalgia and globus sensation. Staging evaluation revealed a 2.7 x 2.2 x 2.7 cm left tonsillar primary tumor with multiple involved cervical lymph nodes including a 1.8 cm left level 2/3 node, a 3.4 cm left level 4 node and a suspicious 1.4 cm contralateral right level 2 node. He received curative intent chemoradiotherapy with concurrent weekly cisplatin as described above.    Mr. Murphy returns to ENT multiD clinic today for a routine posttreatment disease surveillance visit. On examination, he is continuing to make a gradual recovery from his acute radiation-induced toxicities. He presented to the ED locally on 2021 with concerns for a PEG site infection with 2-3 days of purulent drainage from his PEG insertion site. The PEG was removed uneventfully in the ED and he was placed on antibiotics. He is eating a solid diet although reports ongoing mild to moderate odynophagia as well as burning with certain spicy/acidic foods. He also describes ongoing treatment-induced xerostomia which she is treating with cevimeline.    PHYSICAL EXAM:  Weight: 89 kg  BP: 114/76  Pulse: 66     General: Fatigued appearing 71-year-old gentleman seated  comfortably in an examination chair in no acute distress  HEENT: NC/AT.  EOMI.  No rhinorrhea or epistaxis.  EACs clear bilaterally.  Small serous effusion on the left with a normal-appearing TM on the right.  Moderately dry mucous membranes with thickened oral secretions.  Post-radiotherapy changes with resolving mucositis predominantly involving the left oropharynx and left posterior lateral bucca mucosa and gingiva.  No thrush.  No evidence of residual disease.  Neck: Mild fibrosis of the bilateral neck.  Moderate submental lymphedema.  No palpable cervical adenopathy bilaterally.  Pulmonary: No wheezing, stridor or respiratory distress  Skin: Mild to moderate hyperpigmentation throughout the bilateral neck.  No desquamation or ulceration.    Dr. Fang performed a flexible nasopharyngoscopy in clinic today. Please see her note for complete details regarding this procedure. Briefly, this demonstrates post-radiotherapy changes with mild edema of the left AE fold and arytenoid. No evidence of residual disease.    LABS AND IMAGIN/3/2021 PET/CT:  Posttreatment changes with no evidence of residual disease.    9/3/2021 labs:  TSH: 42.2  Free T4: 0.40    IMPRESSION:   Mr. 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 He is 3 months out from the completion of treatment and is clinically and radiologically CHANDA.    PLAN:   1. Follow-up with Dr. Fang in 2 months and in multiD clinic in 4 months  2. Start Synthroid 137 mcg daily and recheck TSH in 2 months  3. Referral placed for lymphedema therapy    Ahmet Robbins MD/PhD    Department of Radiation Oncology  [facility]

## 2021-09-03 NOTE — LETTER
9/3/2021       RE: Quan Murphy  [address]  [facility] [address]     Dear Colleague,    Thank you for referring your patient, Quan Murphy, to the [facility] [place] [facility]. Please see a copy of my visit note below.    Dear Dr. Christensen:    I had the pleasure of seeing Quan Murphy in follow-up today at the [facility].     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 The patient has a history of foreign body sensation on the left side starting a few months ago. He tried gargling and using a neti pot with no improvement. He also developed left neck discomfort which radiates into his head. He saw primary care on 3/4/2021. He was treated with a course of antibiotics with no improvement. He saw seen by Dr Christensen on 3/22/2021. At that time he had an enlarged left tonsil. A biopsy was performed at that time which was consistent with a p16+ SCC. He had a CT scan on 3/23/2021 which showed a 2.9 x 2.7 x 3.5 cm left tonsil cancer, involving the soft palate, left level II and III lymphadenopathy. He had a PET scan performed today which showed the primary tumor in the tonsil and soft palate, left-sided lymphadenopathy with SHAYNE present (3.4 x 2.0 cm, 1.8 x 1.8 cm), right-sided node (1.4 x 0.9 cm) with FDG activity, no distant disease.    He was treated with definitive chemoradiation from 4/28/2021 to 6/12/2021. He had a total of 6996 cGy under the care of Dr Robbins. He had weekly cisplatin for a total of 5 cycles under the care of Dr Schrader. He had a difficult time with pain control during his treatment secondary to his chronic pain medication use and pain medications were managed by palliative care. He was hospitalized from 6/2/2021 to 6/9/2021 for failure to thrive. He had reactive PEG tube placement on 6/7/2021 due to  inability to maintain adequate nutrition and intolerance of the idea of an NG tube.        Interval history:  He comes in today for follow-up. He was last seen in July 2021.  He comes in today with his PET scan and thyroid labs.  His PET scan was negative for recurrent disease.  His TSH is elevated with a low T4.  He was seen in the emergency department in August 2021 with concerns for a PEG site infection.  He did have his PEG tube removed at that time.  He says that he continues to have pain with swallowing of solid food.  He has burning of his mouth with certain foods.  He does not have any issues with drinking of liquids.  He continues to struggle with a dry mouth.  He thinks he is getting some benefit from the cevimeline.  He has dropped his weight a little bit, is out of 196 pounds.  He continues to feel like his left ear is plugged.  He is using salt and soda rinses.  He is working towards transitioning his pain regimen controlled to his primary care from palliative care.      MEDICATIONS:     Current Outpatient Medications   Medication Sig Dispense Refill     amoxicillin-clavulanate (AUGMENTIN-ES) 600-42.9 MG/5ML suspension Take 5 mLs (600 mg) by mouth 2 times daily 200 mL 0     ASPIRIN ADULT LOW STRENGTH 81 MG EC tablet TAKE ONE TABLET BY MOUTH ONCE DAILY 90 tablet 1     cevimeline (EVOXAC) 30 MG capsule Take 1 capsule (30 mg) by mouth 3 times daily 120 capsule 11     citalopram (CELEXA) 40 MG tablet TAKE ONE TABLET BY MOUTH ONCE DAILY 30 tablet 8     dronabinol (MARINOL) 2.5 MG capsule Take 1 capsule (2.5 mg) by mouth 2 times daily (before meals) 28 capsule 3     fluconazole (DIFLUCAN) 200 MG tablet Take 1 tablet (200 mg) by mouth daily 7 tablet 0     [START ON 9/5/2021] HYDROcodone-acetaminophen (NORCO)  MG per tablet Take 1-2 tablets by mouth every 4 hours as needed for severe pain . Take maximum of 8 tabs a day. 24 tablet 0     hydrOXYzine (VISTARIL) 25 MG capsule TAKE 1 CAPSULE (25 MG) BY MOUTH 4  TIMES DAILY AS NEEDED FOR ANXIETY 120 capsule 4     levothyroxine (SYNTHROID/LEVOTHROID) 137 MCG tablet Take 1 tablet (137 mcg) by mouth daily 30 tablet 11     methadone (DOLOPHINE) 5 MG tablet Take 5 mg by mouth 2 times daily TAke 1/2 tab 3 times a day for a week, then twice a day for a week, then once a day for a week, then stop.  28 tablet 0     metoprolol succinate ER (TOPROL-XL) 50 MG 24 hr tablet Take 1 tablet (50 mg) by mouth daily 90 tablet 3     naloxone (NARCAN) 4 MG/0.1ML nasal spray Spray 1 spray (4 mg) into one nostril alternating nostrils as needed for opioid reversal every 2-3 minutes until assistance arrives 0.2 mL      nitroGLYcerin (NITROSTAT) 0.4 MG sublingual tablet For chest pain place 1 tablet under the tongue every 5 minutes for 3 doses. If symptoms persist 5 minutes after 1st dose call 911. 25 tablet 0     nystatin (MYCOSTATIN) 192062 UNIT/ML suspension Take 5 mLs (500,000 Units) by mouth 4 times daily 240 mL 0     omeprazole (PRILOSEC) 40 MG DR capsule Take 1 capsule (40 mg) by mouth daily 90 capsule 3     prochlorperazine (COMPAZINE) 5 MG tablet TAKE 1 TABLET BY MOUTH EVERY 6 HOURS AS NEEDED FOR NAUSEA OR VOMITING       rosuvastatin (CRESTOR) 40 MG tablet Take 1 tablet (40 mg) by mouth daily 90 tablet 0     testosterone (ANDROGEL 1.62 % PUMP) 20.25 MG/ACT gel testosterone 20.25 mg/1.25 gram (1.62 %) transdermal gel pump       zolpidem ER (AMBIEN CR) 12.5 MG CR tablet Take 1 tablet (12.5 mg) by mouth nightly as needed for sleep 30 tablet 3       ALLERGIES:    Allergies   Allergen Reactions     Animal Dander      Azithromycin Nausea and Vomiting     Dust Mites      Pollen Extract      Smoke.        HABITS/SOCIAL HISTORY:   Nonsmoker. Chewing tobacco use occasionally when fishing. . Drink 2-3 beers per day.   Lives with wife (nurse).   Retired .     Social History     Socioeconomic History     Marital status:      Spouse name: Alessandra     Number of children: 2      Years of education: Not on file     Highest education level: Not on file   Occupational History     Occupation: Retired   Tobacco Use     Smoking status: Never Smoker     Smokeless tobacco: Never Used   Substance and Sexual Activity     Alcohol use: Yes     Alcohol/week: 8.3 standard drinks     Types: 10 Cans of beer per week     Comment: 2 beers a day      Drug use: No     Sexual activity: Yes     Partners: Female     Birth control/protection: Post-menopausal, Female Surgical   Other Topics Concern      Service Not Asked     Blood Transfusions Not Asked     Caffeine Concern Not Asked     Occupational Exposure Not Asked     Hobby Hazards Not Asked     Sleep Concern Not Asked     Stress Concern Not Asked     Weight Concern Not Asked     Special Diet Not Asked     Back Care Not Asked     Exercise Not Asked     Bike Helmet Not Asked     Seat Belt Not Asked     Self-Exams Not Asked     Parent/sibling w/ CABG, MI or angioplasty before 65F 55M? No   Social History Narrative     Not on file     Social Determinants of Health     Financial Resource Strain:      Difficulty of Paying Living Expenses:    Food Insecurity:      Worried About Running Out of Food in the Last Year:      Ran Out of Food in the Last Year:    Transportation Needs:      Lack of Transportation (Medical):      Lack of Transportation (Non-Medical):    Physical Activity:      Days of Exercise per Week:      Minutes of Exercise per Session:    Stress:      Feeling of Stress :    Social Connections:      Frequency of Communication with Friends and Family:      Frequency of Social Gatherings with Friends and Family:      Attends Yazidism Services:      Active Member of Clubs or Organizations:      Attends Club or Organization Meetings:      Marital Status:    Intimate Partner Violence:      Fear of Current or Ex-Partner:      Emotionally Abused:      Physically Abused:      Sexually Abused:        PAST MEDICAL HISTORY:   Past Medical History:    Diagnosis Date     Allergic rhinitis      Allergy, unspecified not elsewhere classified     Seasonal allergies, pollen, dust, smoke and animals     Antiplatelet or antithrombotic long-term use      Anxiety      Arthritis      Chest pain      Chronic sinusitis      Coronary atherosclerosis of unspecified type of vessel, native or graft     Coronary artery disease     Depressive disorder 1995     Gastroesophageal reflux disease 2020    Cleared with medication     Head injury 1954     Hiatal hernia 2015    Right Side     History of blood transfusion 12/15/2004    Prostate Surgery - My own blood     Hyperlipidemia      Hypertension      Inguinal hernia      Kidney problem 10/08/2017    Lithotripsy     Kidney stones      Malignant neoplasm of prostate (H)     Prostate cancer     Prostate cancer (H)         PAST SURGICAL HISTORY:   Past Surgical History:   Procedure Laterality Date     ARTHRODESIS FOOT  7/23/2013    Procedure: ARTHRODESIS FOOT;  Great Toe Arthrodesis Left Foot;  Surgeon: Ash Gonzalez DPM;  Location: [facility] OR     ARTHRODESIS FOOT  6/10/2014    Procedure: ARTHRODESIS FOOT;  Surgeon: Ash Gonzalez DPM;  Location: [facility] OR     BIOPSY  10/1/2004    dermatologist biopsies     C LAPAROSCOPY, SURGICAL PROSTATECTOMY, RETROPUBIC RADICAL, W/NERVE SPARING  11/30/2004    With full bilateral pelvic lymphadenectomy.  -[facility].     C TOTAL KNEE ARTHROPLASTY  05/01/08    Left knee     COLONOSCOPY  10/7/2013    Procedure: COLONOSCOPY;  Colonoscopy;  Surgeon: Mike Fallon MD;  Location:  GI     ESOPHAGOSCOPY, GASTROSCOPY, DUODENOSCOPY (EGD), COMBINED N/A 2/12/2020    Procedure: ESOPHAGOGASTRODUODENOSCOPY (EGD);  Surgeon: Sam Escobar MD;  Location:  GI     EXTRACORPOREAL SHOCK WAVE LITHOTRIPSY (ESWL) Bilateral 10/18/2017    Procedure: EXTRACORPOREAL SHOCK WAVE LITHOTRIPSY (ESWL);  BILATERAL EXTRACORPOREAL SHOCKWAVE LITHOTRIPSY ;  Surgeon: Meir Torres MD;  Location:  OR      CORRECT  "BUNION,SIMPLE  08/11/2005    x3     HC REMV TOE BENIGN BONE LESN  08/11/2005     HEAD & NECK SURGERY  1954    From a fall     HERNIORRHAPHY INGUINAL  7/3/2013    Procedure: HERNIORRHAPHY INGUINAL;  Open Repair Inguinal hernia Right with mesh ;  Surgeon: Sam Escobar MD;  Location: [facility] OR     IR GASTROSTOMY TUBE PERCUTANEOUS PLCMNT  6/7/2021     MOHS MICROGRAPHIC PROCEDURE  08/23/11    ear and chin-[facility]     OPEN REDUCTION INTERNAL FIXATION WRIST Right 7/18/2017    Procedure: OPEN REDUCTION INTERNAL FIXATION WRIST;  Right distal radius open reduction and internal fixation;  Surgeon: Pedro Blanca DO;  Location: [facility] OR     RECONSTRUCT FOREFOOT WITH METATARSOPHALANGEAL (MTP) FUSION  6/10/2014    Procedure: RECONSTRUCT FOREFOOT WITH METATARSOPHALANGEAL (MTP) FUSION;  Surgeon: Ash Gonzalez DPM;  Location: [facility] OR     STENT, CORONARY, DEMI       SURGICAL HISTORY OF -   1999/1974    lt knee     SURGICAL HISTORY OF -   10/2004    lithotripsy     SURGICAL HISTORY OF -   11/05    angiogram with stent     VASCULAR SURGERY  11/17/2005    Puncture of iliac artery during and angiogram       FAMILY HISTORY:    Family History   Problem Relation Age of Onset     Hypertension Father         Lived to age 87     Connective Tissue Disorder Mother         LUPUS     Heart Disease Mother         Valve replacement     Anxiety Disorder Mother         Lived to age 84     Dementia Mother         Nursing Home (lived to age 86)       REVIEW OF SYSTEMS:  12 point ROS was negative other than the symptoms noted above in the HPI.  Patient Supplied Answers to Review of Systems  [facility] ENT ROS 7/25/2021   Constitutional Problems with sleep   Neurology -   Psychology -   Ears, Nose, Throat Nasal congestion or drainage, Sore throat, Trouble swallowing   Allergy/Immunology Allergies or hay fever   Hematologic -         PHYSICAL EXAMINATION:   Temp (!) 96.3  F (35.7  C) (Temporal)   Ht 1.727 m (5' 8\")   Wt 89 kg (196 lb " 3.4 oz)   BMI 29.83 kg/m     Appearance:   normal; NAD, age-appropriate appearance, well-developed, normal habitus   Communication:   normal; communicates verbally, normal voice quality, intermittent stutter   Head/Face:   inspection -  Normal; no scars or visible lesions   Salivary glands -  Normal size, no tenderness, swelling, or palpable masses   Facial strength -  Normal and symmetric    Skin:  normal, no rash   Ears:  auricle (AD) -  normal  EAC (AD) -  normal  TM (AD) -  Normal, no effusion  auricle (AS) -  normal  EAC (AS) -  normal  TM (AS) -  Serous effusion  Normal clinical speech reception   Nose:  Ext. inspection -  Normal  Internal Inspection -  Normal mucosa, septum, and turbinates   Nasopharynx:  normal mucosa, no masses   Oral Cavity:  lips -  Normal mucosa, oral competence, and stoma size   Age-appropriate dentition, healthy gingival mucosa   Hard palate, buccal, floor of mouth mucosa with xerostomia   Tongue - normal movement, no masses or mucosal lesions   Oropharynx:  There are radiation changes to the mucosa throughout.  There is residual mucositis along the left tonsillar fossa, left base of tongue, left lateral pharyngeal wall, spares the vallecula  There are no secretions in the vallecula  Palpation is limited of the oropharynx due to gag reflex  No obvious residual disease   Hypopharynx:  Normal pyriform sinus    No pooled secretions    Larynx:  Epiglottis, false vocal cords, true vocal cords normal in appearance, bilaterally mobile cords   The left arytenoid and AE fold have radiation edema that is not obstructive   Neck: No palpable masses  Lymphedema present   Lymphatic:  No palpable masses   Cardiovascular:  warm, pink, well-perfused extremities without swelling, tenderness, or edema   Respiratory:  Normal respiratory effort, no stridor   Neuro/Psych.:  mood/affect -  normal  mental status -  normal         PROCEDURES:   Flexible fiberoptic laryngoscopy: Scope exam was indicated due to  tonsil cancer. Verbal consent was obtained. The nasal cavity was prepped with an aerosolized solution of topical anesthetic and vasoconstrictive agent. The scope was passed through the anterior nasal cavity and advanced. Inspection of the nasopharynx revealed no gross abnormality.  There is mucositis along the left lateral pharyngeal wall and extending into the left base of tongue.  There is no obvious residual mass in the left tonsil/oropharynx.  The epiglottis has very mild thickening.  There is radiation edema of the left AE fold and left arytenoid.  This is not obstructive.  The vocal cords are mobile bilaterally.  The piriform sinuses are clear. The airway is patent. Procedure tolerated well with no immediate complications noted.                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 He completed definitive chemoradiation on 6/12/2021.    He continues to recover from his treatment.  He has no evidence of residual disease.  He does have mucositis present in the left oropharynx along the base of tongue and tonsil.  This is consistent with his PET scan findings.    He was cleared to see the dentist for routine cleaning.    He was seen by SLP team today who spent considerable time working with him again about his oral nutrition and swallowing exercises.    A lymphedema referral was placed.    He had his thyroid labs checked today which show that his TSH is significantly elevated and his T4 is low.  We did start him on some Synthroid today.  We will recheck his labs in a couple of months when he returns to clinic.  We discussed that having his thyroid replacement may help with how he is feeling overall.    He had his PET scan today which appears to be negative for recurrent  disease.  We will plan on repeating his imaging in March 2022.    I would like to see him back in clinic in 2 months.  He will return to multidisciplinary clinic in 4 months.    Thank you very much for the opportunity to participate in the care of your patient.      Ligia Fang MD, M.D.  Otolaryngology- Head & Neck Surgery      This note was dictated with voice recognition software and then edited. Please excuse any unintentional errors.         CC:  Ahmet Robbins MD  Department of Radiation Oncology  [facility]      Too Christensen MD  [address] [place] [address]      Shantih Schrader MD  Department of Medical Oncology  [facility]

## 2021-09-03 NOTE — LETTER
9/3/2021         RE: Quan Murphy  [address]  [facility] [address]        Dear Colleague,    Thank you for referring your patient, Quan Murphy, to the [facility]. Please see a copy of my visit note below.       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 Christensen). Path: SCC, non keratinizing, p16 +gardenia  3/23/21 CT neck. 2.9 x 2.7 x 3.5 cm L tonsil fullness involving soft palate, level 2-3 neck nodes  4/2/21 PET/CT. 2.7 x 2.2 x 2.7 cm L palatine tonsil mass (SUV 24), extension to oral cavity, 3.4 x 2.0 cm L level 4 node invading SCM, 1.8 x 1.8 cm L level 2A/3 node (SUV 7.9) w/ECS, 1.4 x 0.9 cm R level 2A node (SUV 6.7)  4/28~6/12/21  Chemoradiation with weekly cisplatin. No HD cisplatin due to CKD. Cisplatin held 6/2/21 due to malnutrition, SURESH  5/21/21 [facility] ED for fever to 102. No localizing source, cultures negative, not neutropenic  6/2~6/9/21 [facility] for malnutrition, inability to tolerate PO, SURESH. C/b respiratory arrest due to opioids, aspiration pneumonia, anxiety    ASSESSMENT AND PLAN   SCC L tonsil, [id number], p16 +gardenia, ECS +gardenia: 3 months after chemoradiation. I see a mildly hypermetabolic area on the L tonsil area, and a necrotic node in the L neck, but PET/CT report came back during our visit and felt to be post-treatment changes, fortunately. Dr. Robbins and Dr. Fang's note was pending at the time of our visit but New reports they had no concerns on scope exam (ADDENDUM: had a spot of mucositis at  the site of the mild FDG uptake in the tonsil area). Will see him in 6 months after the next CT. Survivorship clinic referral.     Dysphagia: Working with speech path. He had the Gtube removed against our advice a few weeks ago. No weight loss recently.    Lymphedema: referral placed by Dr. Fang earlier today    Hypothyroidism: Starting levothyroxine, prescribed by Dr. Fang    Oral cavity pain: Still has some sores on his tongue, which is more prolonged than we would typically see, but the bulk of the mucositis is resolved. Dr. Serrano has been managing pain given complex pain syndrome coming into the diagnosis. Remains on oxycodone, is on this chronically    CKD: Better    Pulm nodules: Stable    ASCVD:  See note by Dr. Ch, Cardiology    Review of the result(s) of each unique test - CMp, CBC pd, TSH  Independent interpretation of a test performed by another physician/other qualified health care professional (not separately reported) - PET/CT    40 minutes spent on the date of the encounter doing chart review, history and exam, documentation and further activities per the note     Shanthi Schrader MD  Associate Professor of Medicine  Hematology, Oncology and Transplantation      YENNY Wolff returns for 3 month post chemoradiation follow up. Saw Dr. Robbins and Dr. Fang earlier today. Doing ok but struggling to feel better. Eating some but still not really eating solid foods well at all. Nothing is tasting good either. Some dry mouth but managing, keeps water with him and is taking cevimeline, which is helping a little bit. No numbness/tingling. Hearing ok. Breathing ok. Still tired. Frustrated with how long it's taking to feel better and worried about the chronic pain. No other new problems.     PAST MEDICAL HISTORY  SCC as above  ASCVD. Angina in 12/2016. [facility] with proximal LAD stenosis, s/p PTCA and stent. Not on metoprolol due to hypotension. Ses Dr. Aramis Ch, Cardiologist.  CKD baseline ~ 1.2-1.3, up to  1.93 on 11/2/20  HTN  Dyslipidemia  H/o prostate ca s/p robotic prostatectomy about 16 years ago followed by Dr. Meir Torres at [facility] in [place]  Nephrolithasis  Anxiety, insomnia. Takes zolpidem and alprazolam nightly   OA  Chronic sinusitus. Takes chronic hydrocodone-acetaminophen for this  Inguinal hernia  GERD, hiatal hernia. Met with Dr. Adams 2/18/20, symptoms not due to hiatal hernia  ORIF R radius 2017  H/o retroperitoneal hematoma 10/2017. Admitted to [facility]. [place] to be related to lithotripsy 10 days prior  Hemorrhoids  PAD s/p R iliac artery stent after injury during [facility]    CURRENT OUTPATIENT MEDICATIONS  Current Outpatient Medications   Medication Sig Dispense Refill     amoxicillin-clavulanate (AUGMENTIN-ES) 600-42.9 MG/5ML suspension Take 5 mLs (600 mg) by mouth 2 times daily 200 mL 0     ASPIRIN ADULT LOW STRENGTH 81 MG EC tablet TAKE ONE TABLET BY MOUTH ONCE DAILY 90 tablet 1     cevimeline (EVOXAC) 30 MG capsule Take 1 capsule (30 mg) by mouth 3 times daily 120 capsule 11     citalopram (CELEXA) 40 MG tablet TAKE ONE TABLET BY MOUTH ONCE DAILY 30 tablet 8     dronabinol (MARINOL) 2.5 MG capsule Take 1 capsule (2.5 mg) by mouth 2 times daily (before meals) 28 capsule 3     fluconazole (DIFLUCAN) 200 MG tablet Take 1 tablet (200 mg) by mouth daily 7 tablet 0     [START ON 9/5/2021] HYDROcodone-acetaminophen (NORCO)  MG per tablet Take 1-2 tablets by mouth every 4 hours as needed for severe pain . Take maximum of 8 tabs a day. 24 tablet 0     hydrOXYzine (VISTARIL) 25 MG capsule TAKE 1 CAPSULE (25 MG) BY MOUTH 4 TIMES DAILY AS NEEDED FOR ANXIETY 120 capsule 4     levothyroxine (SYNTHROID/LEVOTHROID) 137 MCG tablet Take 1 tablet (137 mcg) by mouth daily 30 tablet 11     methadone (DOLOPHINE) 5 MG tablet Take 5 mg by mouth 2 times daily TAke 1/2 tab 3 times a day for a week, then twice a day for a week, then once a day for a week, then stop.  28 tablet 0     metoprolol succinate  ER (TOPROL-XL) 50 MG 24 hr tablet Take 1 tablet (50 mg) by mouth daily 90 tablet 3     naloxone (NARCAN) 4 MG/0.1ML nasal spray Spray 1 spray (4 mg) into one nostril alternating nostrils as needed for opioid reversal every 2-3 minutes until assistance arrives 0.2 mL      nitroGLYcerin (NITROSTAT) 0.4 MG sublingual tablet For chest pain place 1 tablet under the tongue every 5 minutes for 3 doses. If symptoms persist 5 minutes after 1st dose call 911. 25 tablet 0     nystatin (MYCOSTATIN) 337554 UNIT/ML suspension Take 5 mLs (500,000 Units) by mouth 4 times daily 240 mL 0     omeprazole (PRILOSEC) 40 MG DR capsule Take 1 capsule (40 mg) by mouth daily 90 capsule 3     prochlorperazine (COMPAZINE) 5 MG tablet TAKE 1 TABLET BY MOUTH EVERY 6 HOURS AS NEEDED FOR NAUSEA OR VOMITING       rosuvastatin (CRESTOR) 40 MG tablet Take 1 tablet (40 mg) by mouth daily 90 tablet 0     testosterone (ANDROGEL 1.62 % PUMP) 20.25 MG/ACT gel testosterone 20.25 mg/1.25 gram (1.62 %) transdermal gel pump       zolpidem ER (AMBIEN CR) 12.5 MG CR tablet Take 1 tablet (12.5 mg) by mouth nightly as needed for sleep 30 tablet 3     ALLERGIES  Allergies   Allergen Reactions     Animal Dander      Azithromycin Nausea and Vomiting     Dust Mites      Pollen Extract      Smoke.       REVIEW OF SYSTEMS  As above in the HPI, o/w complete 12-point ROS was negative.    PHYSICAL EXAM  /76   Pulse 66   Temp 98.7  F (37.1  C) (Oral)   Resp 16   Wt 88.9 kg (196 lb)   SpO2 98%   BMI 29.37 kg/m    Wt Readings from Last 3 Encounters:   05/25/21 75 kg (165 lb 6.4 oz)   05/05/21 74.4 kg (164 lb)   05/05/21 74.8 kg (164 lb 14.4 oz)     GEN: NAD  HEENT: EOMI, no icterus, injection or pallor. Oropharynx clear  LUNGS: clear bilaterally  CV: regular, no murmurs, rubs, or gallops  ABDOMEN: soft, non-tender, non-distended, normal bowel sounds  EXT: warm, no edema  NEURO: alert  SKIN: no rashes. Chronic changes from radiation    LABORATORY AND IMAGING  STUDIES  Results for MANUEL MONTOYA (MRN [id number]) as of 9/13/2021 12:37   9/3/2021 14:40   Sodium 135   Potassium 4.4   Chloride 98   Carbon Dioxide 32   Urea Nitrogen 20   Creatinine 1.08   GFR Estimate 69   Calcium 9.4   Anion Gap 5   Magnesium 2.2   Albumin 3.4   Protein Total 7.4   Bilirubin Total 0.5   Alkaline Phosphatase 100   ALT 52   AST 27   T4 Free 0.40 (L)   TSH 42.18 (H)   Glucose 107 (H)   WBC 4.1   Hemoglobin 13.7   Hematocrit 44.1   Platelet Count 206   RBC Count 4.64   MCV 95   MCH 29.5   MCHC 31.1 (L)   RDW 14.4   % Neutrophils 76   % Lymphocytes 11   % Monocytes 12   % Eosinophils 1   Absolute Basophils 0.0   % Basophils 0     Labs were independently reviewed and interpreted by me    CT Soft tissue neck w contrast  Narrative: CT SOFT TISSUE NECK W CONTRAST 7/30/2021 3:49 PM    History:  history of oropharynx cancer s/p chemoradiation; Squamous  cell carcinoma of tonsil (H)  ICD-10: Squamous cell carcinoma of tonsil (H)      Comparison:      Technique: Following intravenous administration of nonionic iodinated  contrast medium, thin section helical CT images were obtained from the  skull base down to the level of the aortic arch.  Axial, coronal and  sagittal reformations were performed with 2-3 mm slice thickness  reconstruction. Images were reviewed in soft tissue, lung and bone  windows.    Contrast: Isovue 370 100cc    Findings:   Evaluation of the mucosal space demonstrates interval resolution of  previously seen left tonsillar mass. There is soft tissue edema within  the next bases with thickening of the epiglottis. There is also  thickening of the left aryepiglottic fold with effacement of the left  piriform sinus.     Decrease in the size of metastatic nodes. For example cystic left  level IIa node measures 1.3 cm, previously 1.8 cm. Left level 3 node  measures 2.6 cm, previously 3.3 cm. There is again no fat plane  between these nodes and the SCM muscle. No new lymphadenopathy.  Other  smaller left neck nodes are stable.    The tongue base appears normal. The major salivary glands appear  unremarkable. The thyroid gland appears normal.    There is no evident cervical lymphadenopathy. The fascial spaces in  the neck are intact bilaterally. The major vascular structures in the  neck appear unremarkable.    Evaluation of the osseous structures demonstrate no worrisome lytic or  sclerotic lesion. No overt spinal canal or neuroforaminal stenosis.  The visualized paranasal sinuses are clear. The mastoid air cells are  clear.     The visualized lung apices are clear.  Impression: Impression:  1. Near-complete resolution of left tonsillar mass.  2. Decrease in the size of left sided metastatic nodes. No new nodes.     MIKY SEXTON MD         SYSTEM ID:  [id number]     Study Result    Narrative & Impression   Combined Report of: PET and CT on  9/3/2021 1:32 PM :     1. PET of the neck, chest, abdomen, and pelvis.  2. PET CT Fusion for Attenuation Correction and Anatomical  Localization:    3. Diagnostic CT scan of the chest, abdomen, and pelvis with  intravenous contrast for interpretation.  3. CT of the chest, abdomen and pelvis obtained for diagnostic  interpretation.  4. 3D MIP and PET-CT fused images were processed on an independent  workstation and archived to PACS and reviewed by a radiologist.     Technique:     1. PET: The patient received 11.68 mCi of F-18-FDG; the serum glucose  was 109 prior to administration, body weight was 88.9 kg. Images were  evaluated in the axial, sagittal, and coronal planes as well as the  rotational whole body MIP. Images were acquired from the Vertex to the  Feet.     UPTAKE WAS MEASURED AT 60 MINUTES.      BACKGROUND:  Liver SUV max= 5.19,   Aorta Blood SUV Max: 3.3.      2. CT: Volumetric acquisition for clinical interpretation of the  chest, abdomen, and pelvis acquired at 3 mm sections after the  uneventful administration of intravenous contrast. The  chest, abdomen,  and pelvis were evaluated at 5 mm sections in bone, soft tissue, and  lung windows.       The patient received 120 cc of Isovue 370 intravenously for the  examination.    High resolution images of the neck were obtained with multiple oblique  projection reformats.     3. 3D MIP and PET-CT fused images were processed on an independent  workstation and archived to PACS and reviewed by a radiologist.     INDICATION: Squamous cell carcinoma of tonsil (H); patient with  oropharyngeal cancer s/p chemoradiation, eval for recurrence     ADDITIONAL INFORMATION OBTAINED FROM EMR: 70 year old man with a  squamous cell carcinoma of the left tonsil, treated with definitive  chemoradiation from 4/28/2021 to 6/12/2021. He had a total of 6996  cGy. He had weekly cisplatin for a total of 5 cycles.     COMPARISON: PET/CT 4/2/2021.     FINDINGS:      HEAD/NECK:  See dedicated neuroradiology report for the results of the high  resolution PET/CT of the head and neck.      CHEST:  There is no suspicious FDG uptake in the chest.      There are no pathologically enlarged mediastinal, hilar or axillary  lymph nodes. Prominent left axillary nodes with mild FDG uptake,  likely related to the COVID vaccination. There is no evidence for  infection.  Scattered punctate calcified nodules suggesting an old  calcified granulomas, for example 2 mm nodule in the left upper lobe  (series 8, image 34) and 2 mm nodule in the right upper lobe (series  8, image 43). There is no significant pericardial or pleural  effusions. Left coronary artery stent.     ABDOMEN AND PELVIS:  There is no suspicious FDG uptake in the abdomen or pelvis.     There are no suspicious hepatic lesions. There is no splenomegaly or  evidence for splenic or pancreatic mass lesion. Small splenule. There  are no suspicious adrenal mass lesions. Cholelithiasis without  evidence of acute cholecystitis. There is symmetric nephrographic  renal phase without  hydronephrosis. There is no evidence for bowel  obstruction or free fluid. Layering two bladder stones. Punctate  nephrolithiasis in the renal isabelle bilaterally. Colonic diverticulosis  without evidence of acute diverticulitis. Right common iliac artery  stent appears patent. Fat-containing left inguinal and periumbilical  hernia. Sequela of the prior PEG tube.     LOWER EXTREMITIES:   No abnormal masses or hypermetabolic lesions.     BONES:   There is no abnormal FDG uptake in the skeleton. Thoracolumbar  scoliosis with convexity to the right about the thoracic spine.  Multilevel degenerative changes in the spine.                                                                      IMPRESSION: In this patient with history of squamous cell carcinoma of  the tonsil:     1. No evidence of metastasis in the body.     2. Incidental findings including nonobstructing nephrolithiasis and  bladder stones, cholelithiasis without evidence of acute  cholecystitis, and colonic diverticulosis without evidence of acute  diverticulitis.     3. Please see separate dictation for the high resolution PET-CT of the  head and neck performed at the same time for discussion of findings.      I have personally reviewed the examination and initial interpretation  and I agree with the findings.     NICHELLE BENITEZ MD      Imaging was personally reviewed and interpreted by me as above         Again, thank you for allowing me to participate in the care of your patient.        Sincerely,        Shanthi Schrader MD

## 2021-09-03 NOTE — NURSING NOTE
Chief Complaint   Patient presents with     Blood Draw     Labs drawn via  by RN in lab. VS taken.      Nikki Sarah RN

## 2021-09-03 NOTE — PROGRESS NOTES
Spoke with patient - advised MD prescribed enough for #8 tabs Monday, Tuesday, Wednesday next week. He sees Dr. Serrano Wednesday and they will determine next steps at that apt. Script dated for Monday.     Patient to call and verify his pharmacy open Monday with holiday weekend - if they are not open he will call to let me know so I can request script be resent with fill day for Sunday instead.

## 2021-09-03 NOTE — PROGRESS NOTES
Patient's wife called back, their pharmacy is not open Monday due to the holiday. Hoping script can be resent for Sunday. Advised I would ask MD to resend dated for Sunday and that script will still be for Mon, Tues, Wed and expected to last until patient's apt with MD. They are in agreement.

## 2021-09-03 NOTE — PATIENT INSTRUCTIONS
F/u with Dr Fang on regular clinic day in 2 months with labs on same day    Start thyroid medication - prescription sent to pharmacy

## 2021-09-03 NOTE — PROGRESS NOTES
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 Christensen). Path: SCC, non keratinizing, p16 +gardenia  3/23/21 CT neck. 2.9 x 2.7 x 3.5 cm L tonsil fullness involving soft palate, level 2-3 neck nodes  4/2/21 PET/CT. 2.7 x 2.2 x 2.7 cm L palatine tonsil mass (SUV 24), extension to oral cavity, 3.4 x 2.0 cm L level 4 node invading SCM, 1.8 x 1.8 cm L level 2A/3 node (SUV 7.9) w/ECS, 1.4 x 0.9 cm R level 2A node (SUV 6.7)  4/28~6/12/21  Chemoradiation with weekly cisplatin. No HD cisplatin due to CKD. Cisplatin held 6/2/21 due to malnutrition, SURESH  5/21/21 [facility] ED for fever to 102. No localizing source, cultures negative, not neutropenic  6/2~6/9/21 [facility] for malnutrition, inability to tolerate PO, SURESH. C/b respiratory arrest due to opioids, aspiration pneumonia, anxiety    ASSESSMENT AND PLAN   SCC L tonsil, [id number], p16 +gardenia, ECS +gardenia: 3 months after chemoradiation. I see a mildly hypermetabolic area on the L tonsil area, and a necrotic node in the L neck, but PET/CT report came back during our visit and felt to be post-treatment changes, fortunately. Dr. Robbins and Dr. Fang's note was pending at the time of our visit but New reports they had no concerns on scope exam (ADDENDUM: had a spot of mucositis at the site of the mild FDG uptake in the tonsil area). Will see him in 6 months after the next CT. Survivorship clinic referral.     Dysphagia: Working with speech path. He had the Gtube removed against our advice a few weeks ago. No weight loss  recently.    Lymphedema: referral placed by Dr. Fang earlier today    Hypothyroidism: Starting levothyroxine, prescribed by Dr. Fang    Oral cavity pain: Still has some sores on his tongue, which is more prolonged than we would typically see, but the bulk of the mucositis is resolved. Dr. Serrano has been managing pain given complex pain syndrome coming into the diagnosis. Remains on oxycodone, is on this chronically    CKD: Better    Pulm nodules: Stable    ASCVD:  See note by Dr. Ch, Cardiology    Review of the result(s) of each unique test - CMp, CBC pd, TSH  Independent interpretation of a test performed by another physician/other qualified health care professional (not separately reported) - PET/CT    40 minutes spent on the date of the encounter doing chart review, history and exam, documentation and further activities per the note     Shanthi Schrader MD  Associate Professor of Medicine  Hematology, Oncology and Transplantation      YENNY Wolff returns for 3 month post chemoradiation follow up. Saw Dr. Robbins and Dr. Fang earlier today. Doing ok but struggling to feel better. Eating some but still not really eating solid foods well at all. Nothing is tasting good either. Some dry mouth but managing, keeps water with him and is taking cevimeline, which is helping a little bit. No numbness/tingling. Hearing ok. Breathing ok. Still tired. Frustrated with how long it's taking to feel better and worried about the chronic pain. No other new problems.     PAST MEDICAL HISTORY  SCC as above  ASCVD. Angina in 12/2016. [facility] with proximal LAD stenosis, s/p PTCA and stent. Not on metoprolol due to hypotension. Ses Dr. Aramis Ch, Cardiologist.  CKD baseline ~ 1.2-1.3, up to 1.93 on 11/2/20  HTN  Dyslipidemia  H/o prostate ca s/p robotic prostatectomy about 16 years ago followed by Dr. Meir Torres at [facility] in [place]  Nephrolithasis  Anxiety, insomnia. Takes zolpidem and alprazolam nightly   OA  Chronic  sinusitus. Takes chronic hydrocodone-acetaminophen for this  Inguinal hernia  GERD, hiatal hernia. Met with Dr. Adams 2/18/20, symptoms not due to hiatal hernia  ORIF R radius 2017  H/o retroperitoneal hematoma 10/2017. Admitted to [facility]. [place] to be related to lithotripsy 10 days prior  Hemorrhoids  PAD s/p R iliac artery stent after injury during [facility]    CURRENT OUTPATIENT MEDICATIONS  Current Outpatient Medications   Medication Sig Dispense Refill     amoxicillin-clavulanate (AUGMENTIN-ES) 600-42.9 MG/5ML suspension Take 5 mLs (600 mg) by mouth 2 times daily 200 mL 0     ASPIRIN ADULT LOW STRENGTH 81 MG EC tablet TAKE ONE TABLET BY MOUTH ONCE DAILY 90 tablet 1     cevimeline (EVOXAC) 30 MG capsule Take 1 capsule (30 mg) by mouth 3 times daily 120 capsule 11     citalopram (CELEXA) 40 MG tablet TAKE ONE TABLET BY MOUTH ONCE DAILY 30 tablet 8     dronabinol (MARINOL) 2.5 MG capsule Take 1 capsule (2.5 mg) by mouth 2 times daily (before meals) 28 capsule 3     fluconazole (DIFLUCAN) 200 MG tablet Take 1 tablet (200 mg) by mouth daily 7 tablet 0     [START ON 9/5/2021] HYDROcodone-acetaminophen (NORCO)  MG per tablet Take 1-2 tablets by mouth every 4 hours as needed for severe pain . Take maximum of 8 tabs a day. 24 tablet 0     hydrOXYzine (VISTARIL) 25 MG capsule TAKE 1 CAPSULE (25 MG) BY MOUTH 4 TIMES DAILY AS NEEDED FOR ANXIETY 120 capsule 4     levothyroxine (SYNTHROID/LEVOTHROID) 137 MCG tablet Take 1 tablet (137 mcg) by mouth daily 30 tablet 11     methadone (DOLOPHINE) 5 MG tablet Take 5 mg by mouth 2 times daily TAke 1/2 tab 3 times a day for a week, then twice a day for a week, then once a day for a week, then stop.  28 tablet 0     metoprolol succinate ER (TOPROL-XL) 50 MG 24 hr tablet Take 1 tablet (50 mg) by mouth daily 90 tablet 3     naloxone (NARCAN) 4 MG/0.1ML nasal spray Spray 1 spray (4 mg) into one nostril alternating nostrils as needed for opioid reversal every 2-3 minutes until  assistance arrives 0.2 mL      nitroGLYcerin (NITROSTAT) 0.4 MG sublingual tablet For chest pain place 1 tablet under the tongue every 5 minutes for 3 doses. If symptoms persist 5 minutes after 1st dose call 911. 25 tablet 0     nystatin (MYCOSTATIN) 877904 UNIT/ML suspension Take 5 mLs (500,000 Units) by mouth 4 times daily 240 mL 0     omeprazole (PRILOSEC) 40 MG DR capsule Take 1 capsule (40 mg) by mouth daily 90 capsule 3     prochlorperazine (COMPAZINE) 5 MG tablet TAKE 1 TABLET BY MOUTH EVERY 6 HOURS AS NEEDED FOR NAUSEA OR VOMITING       rosuvastatin (CRESTOR) 40 MG tablet Take 1 tablet (40 mg) by mouth daily 90 tablet 0     testosterone (ANDROGEL 1.62 % PUMP) 20.25 MG/ACT gel testosterone 20.25 mg/1.25 gram (1.62 %) transdermal gel pump       zolpidem ER (AMBIEN CR) 12.5 MG CR tablet Take 1 tablet (12.5 mg) by mouth nightly as needed for sleep 30 tablet 3     ALLERGIES  Allergies   Allergen Reactions     Animal Dander      Azithromycin Nausea and Vomiting     Dust Mites      Pollen Extract      Smoke.       REVIEW OF SYSTEMS  As above in the HPI, o/w complete 12-point ROS was negative.    PHYSICAL EXAM  /76   Pulse 66   Temp 98.7  F (37.1  C) (Oral)   Resp 16   Wt 88.9 kg (196 lb)   SpO2 98%   BMI 29.37 kg/m    Wt Readings from Last 3 Encounters:   05/25/21 75 kg (165 lb 6.4 oz)   05/05/21 74.4 kg (164 lb)   05/05/21 74.8 kg (164 lb 14.4 oz)     GEN: NAD  HEENT: EOMI, no icterus, injection or pallor. Oropharynx clear  LUNGS: clear bilaterally  CV: regular, no murmurs, rubs, or gallops  ABDOMEN: soft, non-tender, non-distended, normal bowel sounds  EXT: warm, no edema  NEURO: alert  SKIN: no rashes. Chronic changes from radiation    LABORATORY AND IMAGING STUDIES  Results for MANUEL MONTOYA (MRN [id number]) as of 9/13/2021 12:37   9/3/2021 14:40   Sodium 135   Potassium 4.4   Chloride 98   Carbon Dioxide 32   Urea Nitrogen 20   Creatinine 1.08   GFR Estimate 69   Calcium 9.4   Anion Gap 5    Magnesium 2.2   Albumin 3.4   Protein Total 7.4   Bilirubin Total 0.5   Alkaline Phosphatase 100   ALT 52   AST 27   T4 Free 0.40 (L)   TSH 42.18 (H)   Glucose 107 (H)   WBC 4.1   Hemoglobin 13.7   Hematocrit 44.1   Platelet Count 206   RBC Count 4.64   MCV 95   MCH 29.5   MCHC 31.1 (L)   RDW 14.4   % Neutrophils 76   % Lymphocytes 11   % Monocytes 12   % Eosinophils 1   Absolute Basophils 0.0   % Basophils 0     Labs were independently reviewed and interpreted by me    CT Soft tissue neck w contrast  Narrative: CT SOFT TISSUE NECK W CONTRAST 7/30/2021 3:49 PM    History:  history of oropharynx cancer s/p chemoradiation; Squamous  cell carcinoma of tonsil (H)  ICD-10: Squamous cell carcinoma of tonsil (H)      Comparison:      Technique: Following intravenous administration of nonionic iodinated  contrast medium, thin section helical CT images were obtained from the  skull base down to the level of the aortic arch.  Axial, coronal and  sagittal reformations were performed with 2-3 mm slice thickness  reconstruction. Images were reviewed in soft tissue, lung and bone  windows.    Contrast: Isovue 370 100cc    Findings:   Evaluation of the mucosal space demonstrates interval resolution of  previously seen left tonsillar mass. There is soft tissue edema within  the next bases with thickening of the epiglottis. There is also  thickening of the left aryepiglottic fold with effacement of the left  piriform sinus.     Decrease in the size of metastatic nodes. For example cystic left  level IIa node measures 1.3 cm, previously 1.8 cm. Left level 3 node  measures 2.6 cm, previously 3.3 cm. There is again no fat plane  between these nodes and the SCM muscle. No new lymphadenopathy. Other  smaller left neck nodes are stable.    The tongue base appears normal. The major salivary glands appear  unremarkable. The thyroid gland appears normal.    There is no evident cervical lymphadenopathy. The fascial spaces in  the neck are  intact bilaterally. The major vascular structures in the  neck appear unremarkable.    Evaluation of the osseous structures demonstrate no worrisome lytic or  sclerotic lesion. No overt spinal canal or neuroforaminal stenosis.  The visualized paranasal sinuses are clear. The mastoid air cells are  clear.     The visualized lung apices are clear.  Impression: Impression:  1. Near-complete resolution of left tonsillar mass.  2. Decrease in the size of left sided metastatic nodes. No new nodes.     MIKY SEXTON MD         SYSTEM ID:  [id number]     Study Result    Narrative & Impression   Combined Report of: PET and CT on  9/3/2021 1:32 PM :     1. PET of the neck, chest, abdomen, and pelvis.  2. PET CT Fusion for Attenuation Correction and Anatomical  Localization:    3. Diagnostic CT scan of the chest, abdomen, and pelvis with  intravenous contrast for interpretation.  3. CT of the chest, abdomen and pelvis obtained for diagnostic  interpretation.  4. 3D MIP and PET-CT fused images were processed on an independent  workstation and archived to PACS and reviewed by a radiologist.     Technique:     1. PET: The patient received 11.68 mCi of F-18-FDG; the serum glucose  was 109 prior to administration, body weight was 88.9 kg. Images were  evaluated in the axial, sagittal, and coronal planes as well as the  rotational whole body MIP. Images were acquired from the Vertex to the  Feet.     UPTAKE WAS MEASURED AT 60 MINUTES.      BACKGROUND:  Liver SUV max= 5.19,   Aorta Blood SUV Max: 3.3.      2. CT: Volumetric acquisition for clinical interpretation of the  chest, abdomen, and pelvis acquired at 3 mm sections after the  uneventful administration of intravenous contrast. The chest, abdomen,  and pelvis were evaluated at 5 mm sections in bone, soft tissue, and  lung windows.       The patient received 120 cc of Isovue 370 intravenously for the  examination.    High resolution images of the neck were obtained with  multiple oblique  projection reformats.     3. 3D MIP and PET-CT fused images were processed on an independent  workstation and archived to PACS and reviewed by a radiologist.     INDICATION: Squamous cell carcinoma of tonsil (H); patient with  oropharyngeal cancer s/p chemoradiation, eval for recurrence     ADDITIONAL INFORMATION OBTAINED FROM EMR: 70 year old man with a  squamous cell carcinoma of the left tonsil, treated with definitive  chemoradiation from 4/28/2021 to 6/12/2021. He had a total of 6996  cGy. He had weekly cisplatin for a total of 5 cycles.     COMPARISON: PET/CT 4/2/2021.     FINDINGS:      HEAD/NECK:  See dedicated neuroradiology report for the results of the high  resolution PET/CT of the head and neck.      CHEST:  There is no suspicious FDG uptake in the chest.      There are no pathologically enlarged mediastinal, hilar or axillary  lymph nodes. Prominent left axillary nodes with mild FDG uptake,  likely related to the COVID vaccination. There is no evidence for  infection.  Scattered punctate calcified nodules suggesting an old  calcified granulomas, for example 2 mm nodule in the left upper lobe  (series 8, image 34) and 2 mm nodule in the right upper lobe (series  8, image 43). There is no significant pericardial or pleural  effusions. Left coronary artery stent.     ABDOMEN AND PELVIS:  There is no suspicious FDG uptake in the abdomen or pelvis.     There are no suspicious hepatic lesions. There is no splenomegaly or  evidence for splenic or pancreatic mass lesion. Small splenule. There  are no suspicious adrenal mass lesions. Cholelithiasis without  evidence of acute cholecystitis. There is symmetric nephrographic  renal phase without hydronephrosis. There is no evidence for bowel  obstruction or free fluid. Layering two bladder stones. Punctate  nephrolithiasis in the renal isabelle bilaterally. Colonic diverticulosis  without evidence of acute diverticulitis. Right common iliac  artery  stent appears patent. Fat-containing left inguinal and periumbilical  hernia. Sequela of the prior PEG tube.     LOWER EXTREMITIES:   No abnormal masses or hypermetabolic lesions.     BONES:   There is no abnormal FDG uptake in the skeleton. Thoracolumbar  scoliosis with convexity to the right about the thoracic spine.  Multilevel degenerative changes in the spine.                                                                      IMPRESSION: In this patient with history of squamous cell carcinoma of  the tonsil:     1. No evidence of metastasis in the body.     2. Incidental findings including nonobstructing nephrolithiasis and  bladder stones, cholelithiasis without evidence of acute  cholecystitis, and colonic diverticulosis without evidence of acute  diverticulitis.     3. Please see separate dictation for the high resolution PET-CT of the  head and neck performed at the same time for discussion of findings.      I have personally reviewed the examination and initial interpretation  and I agree with the findings.     NICHELLE BENITEZ MD      Imaging was personally reviewed and interpreted by me as above

## 2021-09-03 NOTE — LETTER
9/3/2021      RE: Quan Murphy  [address]  [facility] [address]          Department of Radiation Oncology  [facility]  [address] [place] [address]  [phone]       Radiation Oncology Follow-up Visit  September 3, 2021      Quan Murphy  MRN: [id number]   : 1950     DISEASE TREATED:   cT2 N2 M0 p16 positive squamous cell carcinoma of the left tonsil    RADIATION THERAPY DELIVERED:   Left oropharynx and neck: 6996 cGy in 33 fractions, from 2021 - 2021    SYSTEMIC THERAPY:  Cisplatin 40 mg/m  weekly x5 weeks    INTERVAL SINCE COMPLETION OF RADIATION THERAPY:   3 months    SUBJECTIVE:   Quan Murphy is a 70 year old male with a PMH significant for a stage II p16-positive squamous cell carcinoma of the left tonsil diagnosed in 3/2021 after he presented with a 1 month history of left-sided otalgia and globus sensation. Staging evaluation revealed a 2.7 x 2.2 x 2.7 cm left tonsillar primary tumor with multiple involved cervical lymph nodes including a 1.8 cm left level 2/3 node, a 3.4 cm left level 4 node and a suspicious 1.4 cm contralateral right level 2 node. He received curative intent chemoradiotherapy with concurrent weekly cisplatin as described above.    Mr. Murphy returns to ENT multiD clinic today for a routine posttreatment disease surveillance visit. On examination, he is continuing to make a gradual recovery from his acute radiation-induced toxicities. He presented to the ED locally on 2021 with concerns for a PEG site infection with 2-3 days of purulent drainage from his PEG insertion site. The PEG was removed uneventfully in the ED and he was placed on antibiotics. He is eating a solid diet although reports ongoing mild to moderate odynophagia as well as burning with certain spicy/acidic foods. He also describes ongoing treatment-induced xerostomia which she is treating with cevimeline.    PHYSICAL EXAM:  Weight: 89 kg  BP:  114/76  Pulse: 66     General: Fatigued appearing 71-year-old gentleman seated comfortably in an examination chair in no acute distress  HEENT: NC/AT.  EOMI.  No rhinorrhea or epistaxis.  EACs clear bilaterally.  Small serous effusion on the left with a normal-appearing TM on the right.  Moderately dry mucous membranes with thickened oral secretions.  Post-radiotherapy changes with resolving mucositis predominantly involving the left oropharynx and left posterior lateral bucca mucosa and gingiva.  No thrush.  No evidence of residual disease.  Neck: Mild fibrosis of the bilateral neck.  Moderate submental lymphedema.  No palpable cervical adenopathy bilaterally.  Pulmonary: No wheezing, stridor or respiratory distress  Skin: Mild to moderate hyperpigmentation throughout the bilateral neck.  No desquamation or ulceration.    Dr. Fang performed a flexible nasopharyngoscopy in clinic today. Please see her note for complete details regarding this procedure. Briefly, this demonstrates post-radiotherapy changes with mild edema of the left AE fold and arytenoid. No evidence of residual disease.    LABS AND IMAGIN/3/2021 PET/CT:  Posttreatment changes with no evidence of residual disease.    9/3/2021 labs:  TSH: 42.2  Free T4: 0.40    IMPRESSION:   Mr. 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 He is 3 months out from the completion of treatment and is clinically and radiologically CHANDA.    PLAN:   1. Follow-up with Dr. Fang in 2 months and in multiD clinic in 4 months  2. Start Synthroid 137 mcg daily and recheck TSH in 2 months  3. Referral placed for lymphedema therapy    Ahmet Robbins MD/PhD    Department of Radiation Oncology  [facility]

## 2021-09-08 ENCOUNTER — VIRTUAL VISIT (OUTPATIENT)
Dept: PALLIATIVE CARE | Facility: CLINIC | Age: 71
End: 2021-09-08
Attending: INTERNAL MEDICINE
Payer: MEDICARE

## 2021-09-08 DIAGNOSIS — C09.9 SQUAMOUS CELL CARCINOMA OF LEFT TONSIL (H): ICD-10-CM

## 2021-09-08 DIAGNOSIS — G89.3 NEOPLASM RELATED PAIN: ICD-10-CM

## 2021-09-08 PROCEDURE — 999N000109 HC STATISTIC OP CR VISIT

## 2021-09-08 PROCEDURE — 99214 OFFICE O/P EST MOD 30 MIN: CPT | Mod: 95 | Performed by: INTERNAL MEDICINE

## 2021-09-08 RX ORDER — HYDROCODONE BITARTRATE AND ACETAMINOPHEN 10; 325 MG/1; MG/1
1-2 TABLET ORAL EVERY 4 HOURS PRN
Qty: 105 TABLET | Refills: 0 | Status: SHIPPED | OUTPATIENT
Start: 2021-09-08 | End: 2021-09-17

## 2021-09-08 NOTE — PROGRESS NOTES
New is a 71 year old who is being evaluated via a billable video visit.  Patient reviewed medications and allergies via [employer] e-check in.     How would you like to obtain your AVS? [employer]  If the video visit is dropped, the invitation should be resent by: Send to e-mail at: [email or web address]  Will anyone else be joining your video visit? Yes: wife, Alessandra. How would they like to receive their invitation? Text to cell phone: N/A        Palliative Care Outpatient Clinic      Patient ID:  Medical - He has SCC L tonsil dx 3/2021 [id number] p16+  Definitive chemoradiotherapy 4-6/2021 6/2/2021 admitted with FTT/urgent need for GT placement and had a respiratory arrest 2 d later inpatient thought to be drug-induced. Got PEG and was discharged.  6/12/2021 radiation end date  9/3/2021 eval showed CHANDA but has continued L tongue/pharynx mucositis seen on visual exam and PET     He has CKD and chronic pain (headaches/sinus pain) on long-term hydrocodone therapy (#120 hydrocodone 7.5mg/APAP tabs a month)  On ambien and alprazolam for insomnia/anxiety     Social - Lives with wife. 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     Opioid Safety -  +naloxone  See 5/14/2021 opioid risk/safety discussion; I consider him higher risk due to long history of higher risk polysubstance use (opioids, benzos, alcohol), self-titration of meds, anxiety. Expectations for opioid tapering after cancer treatment discussed. Knows plan is to taper him down to his baseline opioid use and will 'hand back' his chronic pain management to his PCP at that point.     History:  History gathered today from: patient, medical chart    9/3 he saw his oncology team members. No evidence of persistent disease. Dr Fang saw ongoing mucositis ~L base of tongue. PET corroborated this. G tube is out and he's taking all by PO and continues to work with SLP. It hurts to eat, he is maintaining his weight though at  "196#.     Continues to have severe pain in his mouth, L tongue. Continues to take 8 Hydrocodone tabs a day.  Denies side effects, no constipation. He is off methadone now and \"I'm not missing that at all.\" He observes how much he likes the hydrocodone and feels stress     He is seeing his PCP again this month. He wonders if his PCP will take over opioid prescribing.    He is starting lymphedema tx next week for his neck lymphedema    PE: There were no vitals taken for this visit.   Wt Readings from Last 3 Encounters:   09/03/21 88.9 kg (196 lb)   09/03/21 89 kg (196 lb 3.4 oz)   08/24/21 88.9 kg (196 lb)     Alert NAD  Speech fluent, voice strong, occasional dry cough, speaking full sentences  Fullness in neck c/w lymphedema      Data reviewed:  I reviewed recent labs and imaging, my comments:  PET last week with post tx inflammation L oral cavity, CHANDA.  Cr 1  TSH high     database reviewed: y      Impression & Recommendations:    70 yo with definitively treated head and neck cancer complicated by mucositis, ongoing pain.  We are slowly tapering his opioids. He has persistent oral mucositis.    Will do one more week of 8 tabs a day, then reduce to 7 tabs a day max.    He sees his PCP in a week or 2 and my sense is that Mr Murphy hopes to transfer his pain care back to Dr Hernandez then. I let him know of course that's fine with me; however I am happy to continue to work with New to get his pain meds back down to his pre-cancer baseline dose before 'handing back' his pain care to Dr Hernandez if that's what Dr Hernandez prefers. New will let me know.     My overall plan would be to continue to drop by one tab a day q2 weeks until he's down to 4 tabs a day, then drop the dose back down to HC 7.5mg tabs--4 a day: this is his pre cancer baseline opioid dose.     Follow-up with me 1 month if he is going to continue pain care with me, otherwise prn.     I tried to give him encouragement--it's a long tough recovery but " he's doing it and it will continue to get better for him!    35 minutes spent on the date of the encounter doing chart review, history and exam, patient education & counseling, documentation and other activities as noted above.    Thank you for involving us in the patient's care.   Elier Serrano MD / Palliative Medicine / Shaylee self via [facility].    Originating Location (pt. Location): Home    Distant Location (provider location): Home  Platform used for Video Visit: [employer]

## 2021-09-08 NOTE — LETTER
9/8/2021       RE: Quan Murphy  [address]  [facility] [address]     Dear Colleague,    Thank you for referring your patient, [facility]an Murphy[facility]. Please see a copy of my visit note below.    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 Got PEG and was discharged.  6/12/2021 radiation end date  9/3/2021 eval showed CHANDA but has continued L tongue/pharynx mucositis seen on visual exam and PET     He has CKD and chronic pain (headaches/sinus pain) on long-term hydrocodone therapy (#120 hydrocodone 7.5mg/APAP tabs a month)  On ambien and alprazolam for insomnia/anxiety     Social - Lives with wife. 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     Opioid Safety -  +naloxone  See 5/14/2021 opioid risk/safety discussion; I consider him higher risk due to long history of higher risk polysubstance use (opioids, benzos, alcohol), self-titration of meds, anxiety. Expectations for opioid tapering after cancer treatment discussed. Knows plan is to taper him down to his baseline opioid use and will 'hand back' his chronic pain management to his PCP at that point.     History:  History gathered today from: patient, medical chart    9/3 he saw his oncology team members. No evidence of persistent disease. Dr Fang saw ongoing mucositis ~L base of tongue. PET corroborated this. G tube is out and he's taking all by PO and continues to work with SLP. It hurts to eat, he is maintaining his weight though at 196#.     Continues to have severe pain in his mouth, L tongue. Continues to take 8 Hydrocodone tabs a day.  Denies side effects, no  "constipation. He is off methadone now and \"I'm not missing that at all.\" He observes how much he likes the hydrocodone and feels stress     He is seeing his PCP again this month. He wonders if his PCP will take over opioid prescribing.    He is starting lymphedema tx next week for his neck lymphedema    PE: There were no vitals taken for this visit.   Wt Readings from Last 3 Encounters:   09/03/21 88.9 kg (196 lb)   09/03/21 89 kg (196 lb 3.4 oz)   08/24/21 88.9 kg (196 lb)     Alert NAD  Speech fluent, voice strong, occasional dry cough, speaking full sentences  Fullness in neck c/w lymphedema      Data reviewed:  I reviewed recent labs and imaging, my comments:  PET last week with post tx inflammation L oral cavity, CHANDA.  Cr 1  TSH high     database reviewed: y      Impression & Recommendations:    72 yo with definitively treated head and neck cancer complicated by mucositis, ongoing pain.  We are slowly tapering his opioids. He has persistent oral mucositis.    Will do one more week of 8 tabs a day, then reduce to 7 tabs a day max.    He sees his PCP in a week or 2 and my sense is that Mr Murphy hopes to transfer his pain care back to Dr Hernandez then. I let him know of course that's fine with me; however I am happy to continue to work with New to get his pain meds back down to his pre-cancer baseline dose before 'handing back' his pain care to Dr Hernandez if that's what Dr Hernandez prefers. New will let me know.     My overall plan would be to continue to drop by one tab a day q2 weeks until he's down to 4 tabs a day, then drop the dose back down to HC 7.5mg tabs--4 a day: this is his pre cancer baseline opioid dose.     Follow-up with me 1 month if he is going to continue pain care with me, otherwise prn.     I tried to give him encouragement--it's a long tough recovery but he's doing it and it will continue to get better for him!    35 minutes spent on the date of the encounter doing chart review, history " and exam, patient education & counseling, documentation and other activities as noted above.    Thank you for involving us in the patient's care.   Elier Serrano MD / Palliative Medicine / Text me via [facility].      Again, thank you for allowing me to participate in the care of your patient.      Sincerely,    Elier Serrano MD

## 2021-09-09 ENCOUNTER — PATIENT OUTREACH (OUTPATIENT)
Dept: OTOLARYNGOLOGY | Facility: CLINIC | Age: 71
End: 2021-09-09

## 2021-09-09 NOTE — PROGRESS NOTES
Called patient with the following PET results:     Impression: In this patient with p16+ SCC of the left tonsil status  post chemoradiotherapy;  Primary: 1} Expected post-treatment changes in the neck without  evidence of recurrent disease at the left tonsillar region. Mild left  tonsillar mucosal enhancement with associated hypermetabolism likely  represents post chemoradiotherapy changes/mucositis. Continued  attention on follow-up.  Lymph node: 1}  Decrease in size of the cystic necrotic lymph nodes  without suspicious FDG uptake in the left level 2A and 3, likely  represent treated metastatic nodes. No new suspicious lymphadenopathy    IMPRESSION: In this patient with history of squamous cell carcinoma of  the tonsil:     1. No evidence of metastasis in the body.     2. Incidental findings including nonobstructing nephrolithiasis and  bladder stones, cholelithiasis without evidence of acute  cholecystitis, and colonic diverticulosis without evidence of acute  diverticulitis.       Reviewed with patient he should proceed with follow-up with PCP regarding bladder stones and gallstones. Patient will continue with plan for follow with Dr. Fang on 11/15. Patient was encouraged to call sooner with any further questions or concerns.     Chela Mariano, RN, BSN

## 2021-09-15 ENCOUNTER — HOSPITAL ENCOUNTER (OUTPATIENT)
Dept: PHYSICAL THERAPY | Facility: CLINIC | Age: 71
Setting detail: THERAPIES SERIES
End: 2021-09-15
Attending: OTOLARYNGOLOGY
Payer: MEDICARE

## 2021-09-15 DIAGNOSIS — E03.4 HYPOTHYROIDISM DUE TO ACQUIRED ATROPHY OF THYROID: ICD-10-CM

## 2021-09-15 PROCEDURE — 97530 THERAPEUTIC ACTIVITIES: CPT | Mod: GP | Performed by: PHYSICAL THERAPIST

## 2021-09-15 PROCEDURE — 97161 PT EVAL LOW COMPLEX 20 MIN: CPT | Mod: GP | Performed by: PHYSICAL THERAPIST

## 2021-09-16 ENCOUNTER — TELEPHONE (OUTPATIENT)
Dept: UROLOGY | Facility: CLINIC | Age: 71
End: 2021-09-16

## 2021-09-16 ENCOUNTER — PATIENT OUTREACH (OUTPATIENT)
Dept: OTOLARYNGOLOGY | Facility: CLINIC | Age: 71
End: 2021-09-16

## 2021-09-16 DIAGNOSIS — Z85.46 PERSONAL HISTORY OF MALIGNANT NEOPLASM OF PROSTATE: Primary | ICD-10-CM

## 2021-09-16 DIAGNOSIS — R68.2 DRY MOUTH: Primary | ICD-10-CM

## 2021-09-16 DIAGNOSIS — F41.9 ANXIETY: ICD-10-CM

## 2021-09-16 RX ORDER — PILOCARPINE HYDROCHLORIDE 5 MG/1
5 TABLET, FILM COATED ORAL 3 TIMES DAILY
Qty: 90 TABLET | Refills: 3 | Status: SHIPPED | OUTPATIENT
Start: 2021-09-16 | End: 2022-08-19

## 2021-09-16 RX ORDER — CITALOPRAM HYDROBROMIDE 40 MG/1
TABLET ORAL
Qty: 90 TABLET | Refills: 0 | Status: SHIPPED | OUTPATIENT
Start: 2021-09-16 | End: 2021-12-15

## 2021-09-16 NOTE — PROGRESS NOTES
"   09/15/21 1426   Quick Adds   Quick Adds Certification   Rehab Discipline   Discipline PT   Type of Visit   Type of visit Initial Edema Evaluation       present No   General Information   Start of care 09/15/21   Referring physician Dr. Ligia Fang MD   Orders Evaluate and treat as indicated   Order date 09/03/21   Medical diagnosis Hypothyroidism due to acquired atrophy of thyroid (E03.4)   Onset of illness / date of surgery 05/15/21  (\"Middle of May\" )   Edema onset 05/15/21  (\"Middle of May\")   Affected body parts Head / Neck   Edema etiology Radiation;Chemo   Location - Radiation L side of neck    Radiation comments Ended 6/12/21   Pertinent history of current problem (PT: include personal factors and/or comorbidities that impact the POC; OT: include additional occupational profile info) Patient is a 69 y/o male with T2N2 p16+ SCC of the left tonsil diagnosed via biopsy in March 2021.  CT scan showed a 2.9 x 2.7 x 3.5 cm left tonsil cancer, involving the soft palate, left level II and III lymphadenopathy.  He was treated with chemoradiation from 4/28/21 to 6/12/21.  He had a difficult time with pain control during his treatment secondary to his chronic pain medication use.  He was hospitalized from 6/2/21-6/9/21 due to failure to thrive.  He had reactive PEG tube placement on 6/7/21 due to inability to maintain adequate nutrition and intolerance of the idea of an NG tube.  He began noticing increased swelling around middle-end of May.  He has been seen by SLP who spent time working with him on his oral nutrition and swallowing exercises.  He is currently on a soft/liquid diet.     Surgical / medical history reviewed Yes   Edema special tests   (None)   Prior level of functional mobility Independent   Prior treatment   (None - has tried some self massage)   Patient role / employment history Retired   Living environment House / [facility]   Assistive device comments None   Fall Risk " "Screen   Fall screen completed by PT   Have you fallen 2 or more times in the past year? No   Have you fallen and had an injury in the past year? No   Is patient a fall risk? No   Abuse Screen (yes response referral indicated)   Feels Unsafe at Home or Work/School no   Feels Threatened by Someone no   Does Anyone Try to Keep You From Having Contact with Others or Doing Things Outside Your Home? no   Physical Signs of Abuse Present no   System Outcome Measures   Outcome Measures Lymphedema   Lymphedema Life Impact Scale (score range 0-72). A higher score indicates greater impairment. 20   Subjective Report   Patient report of symptoms Just a burning sensation in L side of his throat when swallowing - denies any feelings of throbbing, heaviness, fullness   Patient / Family Goals   Patient / family goals statement Learn how to manage edema   Pain   Patient currently in pain Yes   Pain location L side of throat/neck   Pain description Burning   Pain description comment \"Burning sometimes on the left side when I swallow\"   Edema Exam / Assessment   Skin condition Intact;Non-pitting   Ulceration No   Girth Measurements   Girth Measurements Refer to separate girth measurement flowsheet   Range of Motion   ROM comments Decreased cervical AROM    Posture   Posture Forward head position;Protracted shoulders   Activities of Daily Living   Activities of Daily Living Independent   Bed Mobility   Bed mobility Independent   Transfers   Transfers Independent   Gait / Locomotion   Gait / Locomotion Independent   Coordination   Coordination Gross motor coordination appropriate   Planned Edema Interventions   Planned edema interventions Manual lymph drainage;Fit for compression garment;Exercises;Precautions to prevent infection / exacerbation;Education;Manual therapy;Skin care / precautions;Soft tissue mobilization;Myofascial release;Home management program development   Clinical Impression   Criteria for skilled therapeutic " intervention met Yes   Therapy diagnosis L neck lymphedema   Influenced by the following impairments / conditions Edema;Stage 1   Functional limitations due to impairments / conditions Talking, eating, swallowing   Clinical Presentation Stable/Uncomplicated   Clinical Presentation Rationale Based on observation, history, evaluation and clinical judgment.    Clinical Decision Making (Complexity) Low complexity   Treatment Frequency 1x/week   Treatment duration 90 days    Patient / family and/or staff in agreement with plan of care Yes   Risks and benefits of therapy have been explained Yes   Clinical impression comments Patient presents with signs and symptoms consistent with Stage 1 lymphedema in head/neck following diagnosis of tonsil cancer with subsequent radiation therapy.  He will benefit from skilled therapy services to provide CDT including compression garment, MLD, education, instruction in self management.     Education Assessment   Preferred learning style Listening;Reading;Demonstration;Pictures / video   Barriers to learning No barriers   Goals   Edema Eval Goals 1;2   Goal 1   Goal identifier 1   Goal description Patient will demonstrate reduced girth of 2 cm at neck landmarks in order to demonstrate improvement in submandibular and anterior cervical edema.   Target date 12/13/21   Goal 2   Goal identifier 2   Goal description Patient will demonstrate independent ability to correct perform self MLD on head/neck in order to ensure maintenance of swelling reduction and improved drainage after discharge from therapy.    Target date 12/13/21   Total Evaluation Time   PT Eval, Low Complexity Minutes (57022) 35   Certification   Certification date from 09/15/21   Certification date to 12/13/21   Medical Diagnosis Hypothyroidism due to acquired atrophy of thyroid (E03.4)   Certification I certify the need for these services furnished under this plan of treatment and while under my care.  (Physician  co-signature of this document indicates review and certification of the therapy plan).            Shelley Bello PT, DPT, CLT[facility]  Physical Therapist     Phone: [phone]  Email: [email or web address]

## 2021-09-16 NOTE — PROGRESS NOTES
Received a call from patient indicating that he is feeling his dry mouth is worsening and is wondering if there is anything additional he can try. Discussed with him that we can attempt to change his medication from Evoxac to Salagen to see if this would be more beneficial. Dr. Fang and Dr. Robbins were in agreement with this change.     New prescription sent to pharmacy and patient was encouraged to call if there is no improvement or worsening.     Chela Mariano, RN, BSN

## 2021-09-16 NOTE — TELEPHONE ENCOUNTER
appt in nov, has not seen Dr Rogers. Former Felipe pt    Brisa BENITEZ RN Specialty Triage 9/16/2021 9:49 AM

## 2021-09-16 NOTE — TELEPHONE ENCOUNTER
[place]  Next 5 appointments (look out 90 days)    Nov 09, 2021 11:30 AM  Return Visit with Aramis Ch MD  [facility] [place] ([facility] ) [address] [place] [id number]  [phone]   Dec 13, 2021  1:45 PM  (Arrive by 1:30 PM)  Return Visit with Leisa Zarate CNP  [facility] ([facility] ) [address] [place] [id number] [phone]

## 2021-09-16 NOTE — TELEPHONE ENCOUNTER
Reason for Call:  Other call back    Detailed comments: pt calling to request that PSA level labs be ordered before appt- please call and let pt know if labs are placed     Phone Number Patient can be reached at: Cell number on file:    Telephone Information:   Mobile [phone]       Best Time: any    Can we leave a detailed message on this number? YES    Call taken on 9/16/2021 at 8:17 AM by Maura Song

## 2021-09-16 NOTE — PROGRESS NOTES
"                                                                                                            [facility]        OUTPATIENT PHYSICAL THERAPY EDEMA EVALUATION  PLAN OF TREATMENT FOR OUTPATIENT REHABILITATION  (COMPLETE FOR INITIAL CLAIMS ONLY)  Patient's Last Name, First Name, THANHYANG KearnssalvatoreQuan  ALFREDA                           Provider s Name:   [facility] Medical Record No.  [id number]     Start of Care Date:  09/15/21   Onset Date:  05/15/21 (\"Middle of May\")   Type:  PT   Medical Diagnosis:  Hypothyroidism due to acquired atrophy of thyroid (E03.4)   Therapy Diagnosis:  L neck lymphedema Visits from SOC:  1                                     __________________________________________________________________________________   Plan of Treatment/Functional Goals:    Manual lymph drainage, Fit for compression garment, Exercises, Precautions to prevent infection / exacerbation, Education, Manual therapy, Skin care / precautions, Soft tissue mobilization, Myofascial release, Home management program development        GOALS  1. Goal description: Patient will demonstrate reduced girth of 2 cm at neck landmarks in order to demonstrate improvement in submandibular and anterior cervical edema.       Target date: 12/13/21    2. Goal description: Patient will demonstrate independent ability to correct perform self MLD on head/neck in order to ensure maintenance of swelling reduction and improved drainage after discharge from therapy.        Target date: 12/13/21             Treatment Frequency: 1x/week   Treatment duration: 90 days     Shelley Bello, PT, DPT, CLT-BETO                                    I CERTIFY THE NEED FOR THESE SERVICES FURNISHED UNDER        THIS PLAN OF TREATMENT AND WHILE UNDER MY CARE     (Physician co-signature of this document indicates review and certification of the therapy plan).                   Certification date from: 09/15/21       " Certification date to: 12/13/21           Referring physician: Dr. Ligia Fang MD   Initial Assessment  See Epic Evaluation- Start of care: 09/15/21

## 2021-09-17 ENCOUNTER — OFFICE VISIT (OUTPATIENT)
Dept: FAMILY MEDICINE | Facility: CLINIC | Age: 71
End: 2021-09-17
Payer: MEDICARE

## 2021-09-17 VITALS
RESPIRATION RATE: 20 BRPM | HEART RATE: 90 BPM | DIASTOLIC BLOOD PRESSURE: 86 MMHG | TEMPERATURE: 97.7 F | BODY MASS INDEX: 29.13 KG/M2 | SYSTOLIC BLOOD PRESSURE: 128 MMHG | WEIGHT: 191.6 LBS | OXYGEN SATURATION: 95 %

## 2021-09-17 DIAGNOSIS — G89.3 NEOPLASM RELATED PAIN: Primary | ICD-10-CM

## 2021-09-17 DIAGNOSIS — F41.9 ANXIETY: ICD-10-CM

## 2021-09-17 DIAGNOSIS — M12.9 ARTHROPATHY: ICD-10-CM

## 2021-09-17 DIAGNOSIS — C09.9 SQUAMOUS CELL CARCINOMA OF LEFT TONSIL (H): ICD-10-CM

## 2021-09-17 PROCEDURE — 99214 OFFICE O/P EST MOD 30 MIN: CPT | Mod: 25 | Performed by: FAMILY MEDICINE

## 2021-09-17 PROCEDURE — G0008 ADMIN INFLUENZA VIRUS VAC: HCPCS | Performed by: FAMILY MEDICINE

## 2021-09-17 PROCEDURE — 90662 IIV NO PRSV INCREASED AG IM: CPT | Performed by: FAMILY MEDICINE

## 2021-09-17 RX ORDER — HYDROXYZINE PAMOATE 25 MG/1
25 CAPSULE ORAL 4 TIMES DAILY PRN
Qty: 120 CAPSULE | Refills: 4 | Status: SHIPPED | OUTPATIENT
Start: 2021-09-17 | End: 2022-02-09

## 2021-09-17 RX ORDER — ALPRAZOLAM 0.5 MG
0.5 TABLET ORAL 3 TIMES DAILY PRN
Qty: 60 TABLET | Refills: 0 | Status: SHIPPED | OUTPATIENT
Start: 2021-09-17 | End: 2021-10-13

## 2021-09-17 RX ORDER — HYDROCODONE BITARTRATE AND ACETAMINOPHEN 10; 325 MG/1; MG/1
1-2 TABLET ORAL EVERY 4 HOURS PRN
Qty: 105 TABLET | Refills: 0 | Status: SHIPPED | OUTPATIENT
Start: 2021-09-17 | End: 2021-09-30

## 2021-09-17 ASSESSMENT — PAIN SCALES - GENERAL: PAINLEVEL: NO PAIN (0)

## 2021-09-17 NOTE — PROGRESS NOTES
"    Assessment & Plan     Neoplasm related pain  His pain management has been turned over to primary care again from his palliative care providers.  They started a tapering on him.  We have given him 2 weeks now of 7 tablets a day and then hope to go down to 6 tablets a day of the Norco for another month.  Then she like to continue tapering.  - HYDROcodone-acetaminophen (NORCO)  MG per tablet; Take 1-2 tablets by mouth every 4 hours as needed for severe pain . Take maximum of  7 tabs a day.    Anxiety  Approved some Xanax for him.  He was really beside himself today.  He was scheduled for a full physical along with dealing with all these other issues.  He appeared 18 minutes late for various reasons.  He told him he could not be seen and is really unsettled him.  He announced that he could be seen for some issues but we would not be doing a physical today.  He is on top dose Celexa and hydroxyzine as well.  - ALPRAZolam (XANAX) 0.5 MG tablet; Take 1 tablet (0.5 mg) by mouth 3 times daily as needed for anxiety    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)  Managed by oncology.  This is what is causing much of his pain and anxiety.  - HYDROcodone-acetaminophen (NORCO)  MG per tablet; Take 1-2 tablets by mouth every 4 hours as needed for severe pain . Take maximum of  7 tabs a day.    Arthropathy  Baseline issues with arthropathy.  - hydrOXYzine (VISTARIL) 25 MG capsule; Take 1 capsule (25 mg) by mouth 4 times daily as needed for anxiety             BMI:   Estimated body mass index is 29.13 kg/m  as calculated from the following:    Height as of 9/3/21: 1.727 m (5' 8\").    Weight as of this encounter: 86.9 kg (191 lb 9.6 oz).   Most concerned of his diet.  He is just on liquid diet now.  He has lost some weight but watching his caloric intake.        No follow-ups on file.    Jose Hernandez MD  [facility]    Claribel Wolff is a 71 year old who presents for the following health " issues     HPI     Chief Complaint   Patient presents with     Follow Up     discuss cancer and follow ups           Review of Systems   Constitutional, HEENT, cardiovascular, pulmonary, gi and gu systems are negative, except as otherwise noted.      Objective    /86 (BP Location: Right arm, Patient Position: Sitting, Cuff Size: Adult Regular)   Pulse 90   Temp 97.7  F (36.5  C) (Temporal)   Resp 20   Wt 86.9 kg (191 lb 9.6 oz)   SpO2 95%   BMI 29.13 kg/m    Body mass index is 29.13 kg/m .  Physical Exam   GENERAL: healthy, alert and no distress  EYES: Eyes grossly normal to inspection, PERRL and conjunctivae and sclerae normal  PSYCH: tearful, anxious and speech pressured

## 2021-09-21 ENCOUNTER — HOSPITAL ENCOUNTER (OUTPATIENT)
Dept: PHYSICAL THERAPY | Facility: CLINIC | Age: 71
Setting detail: THERAPIES SERIES
End: 2021-09-21
Attending: OTOLARYNGOLOGY
Payer: MEDICARE

## 2021-09-21 PROCEDURE — 97140 MANUAL THERAPY 1/> REGIONS: CPT | Mod: GP | Performed by: PHYSICAL THERAPIST

## 2021-09-24 ENCOUNTER — MYC MEDICAL ADVICE (OUTPATIENT)
Dept: FAMILY MEDICINE | Facility: CLINIC | Age: 71
End: 2021-09-24

## 2021-09-24 NOTE — CONFIDENTIAL NOTE
My Chart message sent to patient. Dr. Hernandez done for the day. Suggest he be seen at  in [place].....RUTH Rolle

## 2021-09-26 ENCOUNTER — PATIENT OUTREACH (OUTPATIENT)
Dept: ONCOLOGY | Facility: CLINIC | Age: 71
End: 2021-09-26

## 2021-09-28 ENCOUNTER — VIRTUAL VISIT (OUTPATIENT)
Dept: ONCOLOGY | Facility: CLINIC | Age: 71
End: 2021-09-28
Payer: MEDICARE

## 2021-09-28 DIAGNOSIS — C09.9 SQUAMOUS CELL CARCINOMA OF LEFT TONSIL (H): ICD-10-CM

## 2021-09-28 DIAGNOSIS — C61 MALIGNANT NEOPLASM OF PROSTATE (H): Primary | ICD-10-CM

## 2021-09-28 PROCEDURE — 98967 PH1 ASSMT&MGMT NQHP 11-20: CPT | Performed by: DIETITIAN, REGISTERED

## 2021-09-28 NOTE — LETTER
"    9/28/2021         RE: Quan Murphy  [address] [facility] [address]        Dear Colleague,    Thank you for referring your patient, Quan Murphy, to the [facility] [place]. Please see a copy of my visit note below.    The patient has been notified of the following:      \"We have found that certain health care needs can be provided without the need for a face to face visit.  This service lets us provide the care you need with a phone conversation.       I will have full access to your [place] medical record during this entire phone call.   I will be taking notes for your medical record.      Since this is like an office visit, we will bill your insurance company for this service.       There are potential benefits and risks of telephone visits (e.g. limits to patient confidentiality) that differ from in-person visits.?  Confidentiality still applies for telephone services, and nobody will record the visit.  It is important to be in a quiet, private space that is free of distractions (including cell phone or other devices) during the visit.??      If during the course of the call I believe a telephone visit is not appropriate, you will not be charged for this service\"     Consent has been obtained for this service by care team member: Yes     CLINICAL NUTRITION SERVICES - REASSESSMENT NOTE   EVALUATION OF PREVIOUS PLAN OF CARE:   Referring Physician: Itzel/Barbara   Time spent with patient: 15 minutes.    Current diet: soft/pureed foods  Current appetite/intake: fair appetite  PEG Tube: Removed ~5 weeks ago in urgent care due to concern with infection    Monitoring from previous assessment:   -Food intake - Still struggles extensively with dry mouth; poor appetite with dry mouth.   Has been trying to find foods that also taste good.  He still struggles with dysgeusia.    B - egg, sausage, pancake w/syrup and butter  L - shakes; rice pudding  D - shakes - total of 5 /day (1750 debby, " 75g protein)  -Liquid meal replacement or supplement -   -Weight trends -  Down 5 lb x past 2 weeks; has regained 2 lb per his report on home scale 193 lb  Wt Readings from Last 8 Encounters:   09/17/21 86.9 kg (191 lb 9.6 oz)   09/03/21 88.9 kg (196 lb)   09/03/21 89 kg (196 lb 3.4 oz)   08/24/21 88.9 kg (196 lb)   07/30/21 89 kg (196 lb 3.4 oz)   07/30/21 89 kg (196 lb 1.6 oz)   07/02/21 90.6 kg (199 lb 12.8 oz)   06/15/21 91.6 kg (202 lb)     Previous Goals:   1. PO intake of ONS/soft, solid foods to meet 100% of estimated nutrition needs   2. Weight maintenance   Evaluation: Not met   Previous Nutrition Diagnosis:   No nutrition diagnosis identified at this time   Evaluation: Declining   NEW FINDINGS:   5 lb wt loss x 2 weeks   CURRENT NUTRITION DIAGNOSIS   Inadequate oral intake related to xerostomia as evidenced by 5 lb wt loss, difficulty eating   INTERVENTIONS   Recommendations / Nutrition Prescription   1. Suggested reduce ONS to 4/day  2. Increase PO intake of solid, soft, most foods by 400 debby, 20g protein/day     Implementation  Composition of Meals and Snacks, General/healthful diet and Medical Food Supplement - reviewed total calorie, protein and hydration needs as above.  Encouraged to aim for >2200 calories and >85g protein/day.  Reviewed ONS provisions (350 debby, 15g protein) with comparison to meals.   Reviewed importance of weight maintenance.     Goals   1. PO intake of ONS/soft, solid foods to meet 100% of estimated nutrition needs   2. Weight maintenance      Follow up/Monitoring:  prn      Crystal Ramos RDN, LDN  [facility]  [phone]          .      Again, thank you for allowing me to participate in the care of your patient.        Sincerely,        Crystal Ramos RD

## 2021-09-28 NOTE — PROGRESS NOTES
"The patient has been notified of the following:      \"We have found that certain health care needs can be provided without the need for a face to face visit.  This service lets us provide the care you need with a phone conversation.       I will have full access to your [facility] medical record during this entire phone call.   I will be taking notes for your medical record.      Since this is like an office visit, we will bill your insurance company for this service.       There are potential benefits and risks of telephone visits (e.g. limits to patient confidentiality) that differ from in-person visits.?  Confidentiality still applies for telephone services, and nobody will record the visit.  It is important to be in a quiet, private space that is free of distractions (including cell phone or other devices) during the visit.??      If during the course of the call I believe a telephone visit is not appropriate, you will not be charged for this service\"     Consent has been obtained for this service by care team member: Yes     CLINICAL NUTRITION SERVICES - REASSESSMENT NOTE   EVALUATION OF PREVIOUS PLAN OF CARE:   Referring Physician: Itzel/Barbara   Time spent with patient: 15 minutes.    Current diet: soft/pureed foods  Current appetite/intake: fair appetite  PEG Tube: Removed ~5 weeks ago in urgent care due to concern with infection    Monitoring from previous assessment:   -Food intake - Still struggles extensively with dry mouth; poor appetite with dry mouth.   Has been trying to find foods that also taste good.  He still struggles with dysgeusia.    B - egg, sausage, pancake w/syrup and butter  L - shakes; rice pudding  D - shakes - total of 5 /day (1750 debby, 75g protein)  -Liquid meal replacement or supplement -   -Weight trends -  Down 5 lb x past 2 weeks; has regained 2 lb per his report on home scale 193 lb  Wt Readings from Last 8 Encounters:   09/17/21 86.9 kg (191 lb 9.6 oz)   09/03/21 88.9 kg (196 lb) "   09/03/21 89 kg (196 lb 3.4 oz)   08/24/21 88.9 kg (196 lb)   07/30/21 89 kg (196 lb 3.4 oz)   07/30/21 89 kg (196 lb 1.6 oz)   07/02/21 90.6 kg (199 lb 12.8 oz)   06/15/21 91.6 kg (202 lb)     Previous Goals:   1. PO intake of ONS/soft, solid foods to meet 100% of estimated nutrition needs   2. Weight maintenance   Evaluation: Not met   Previous Nutrition Diagnosis:   No nutrition diagnosis identified at this time   Evaluation: Declining   NEW FINDINGS:   5 lb wt loss x 2 weeks   CURRENT NUTRITION DIAGNOSIS   Inadequate oral intake related to xerostomia as evidenced by 5 lb wt loss, difficulty eating   INTERVENTIONS   Recommendations / Nutrition Prescription   1. Suggested reduce ONS to 4/day  2. Increase PO intake of solid, soft, most foods by 400 debby, 20g protein/day     Implementation  Composition of Meals and Snacks, General/healthful diet and Medical Food Supplement - reviewed total calorie, protein and hydration needs as above.  Encouraged to aim for >2200 calories and >85g protein/day.  Reviewed ONS provisions (350 debby, 15g protein) with comparison to meals.   Reviewed importance of weight maintenance.     Goals   1. PO intake of ONS/soft, solid foods to meet 100% of estimated nutrition needs   2. Weight maintenance      Follow up/Monitoring:  winter Ramos RDN, GR[place]  [facility]  [phone]          .

## 2021-09-30 ENCOUNTER — HOSPITAL ENCOUNTER (OUTPATIENT)
Dept: PHYSICAL THERAPY | Facility: CLINIC | Age: 71
Setting detail: THERAPIES SERIES
End: 2021-09-30
Attending: OTOLARYNGOLOGY
Payer: MEDICARE

## 2021-09-30 ENCOUNTER — OFFICE VISIT (OUTPATIENT)
Dept: FAMILY MEDICINE | Facility: CLINIC | Age: 71
End: 2021-09-30
Payer: MEDICARE

## 2021-09-30 VITALS
HEART RATE: 87 BPM | BODY MASS INDEX: 29.24 KG/M2 | SYSTOLIC BLOOD PRESSURE: 112 MMHG | RESPIRATION RATE: 18 BRPM | OXYGEN SATURATION: 94 % | WEIGHT: 192.3 LBS | DIASTOLIC BLOOD PRESSURE: 68 MMHG | TEMPERATURE: 97 F

## 2021-09-30 DIAGNOSIS — G89.3 NEOPLASM RELATED PAIN: ICD-10-CM

## 2021-09-30 DIAGNOSIS — H65.05 RECURRENT ACUTE SEROUS OTITIS MEDIA OF LEFT EAR: Primary | ICD-10-CM

## 2021-09-30 DIAGNOSIS — C09.9 SQUAMOUS CELL CARCINOMA OF LEFT TONSIL (H): ICD-10-CM

## 2021-09-30 PROCEDURE — 99214 OFFICE O/P EST MOD 30 MIN: CPT | Performed by: FAMILY MEDICINE

## 2021-09-30 PROCEDURE — 97140 MANUAL THERAPY 1/> REGIONS: CPT | Mod: GP | Performed by: PHYSICAL THERAPIST

## 2021-09-30 RX ORDER — HYDROCODONE BITARTRATE AND ACETAMINOPHEN 10; 325 MG/1; MG/1
1-2 TABLET ORAL EVERY 4 HOURS PRN
Qty: 180 TABLET | Refills: 0 | Status: SHIPPED | OUTPATIENT
Start: 2021-09-30 | End: 2021-10-27

## 2021-09-30 RX ORDER — IBUPROFEN 200 MG
200 TABLET ORAL
COMMUNITY
End: 2022-02-02

## 2021-09-30 RX ORDER — CETIRIZINE HYDROCHLORIDE 10 MG/1
10 TABLET ORAL DAILY
COMMUNITY
End: 2023-01-01

## 2021-09-30 ASSESSMENT — PAIN SCALES - GENERAL: PAINLEVEL: MILD PAIN (2)

## 2021-09-30 NOTE — PROGRESS NOTES
Chief Complaint   Patient presents with     Ear Problem     Fluid in left ear now causing soreness     Recheck Medication     Dronabinol is not working.  Is there another med that can help increase appetite.       Other     Has dry mouth, would like to try Guiffin? to help with that.  Would also like a refill for his pain medication.         Assessment & Plan     Recurrent acute serous otitis media of left ear  He has been under treatment for 5 days with Augmentin.  Was seen in urgent care.  His ear is a little better.  Still serous fluid.  Will finish up his treatment.  If it starts coming back symptomatic again we will have him see ENT probably refill another round.    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  Suspicion is that this swelling inside is blocking off his eustachian tube from the left middle ear.  Fully the swelling goes down he has less trouble with serous otitis on the left.  Is considered cancer free at this time.  - HYDROcodone-acetaminophen (NORCO)  MG per tablet; Take 1-2 tablets by mouth every 4 hours as needed for severe pain . Take maximum of 6 tabs a day.    Neoplasm related pain  Discussed again pain reduction on him.  We will now go with 60 mg a day of hydrocodone.  He was at 70.  At 1 time he was at 100.  We will do this 60 mg daily for a month and see about cutting down some more.  He was on Vicodin prior to this neoplasm really for chronic sinus issues seem like it always helped with his drainage.  He was on 3 or 4-day to the surgery.  Like to get him back to baseline.  We had a discussion on this.  He also takes Vistaril which she thinks helps with the effectiveness of the Vicodin.  - HYDROcodone-acetaminophen (NORCO)  MG per tablet; Take 1-2 tablets by mouth every 4 hours as needed for severe pain . Take maximum of 6 tabs a day.             BMI:   Estimated body mass index is 29.24 kg/m  as calculated  "from the following:    Height as of 9/3/21: 1.727 m (5' 8\").    Weight as of this encounter: 87.2 kg (192 lb 4.8 oz).           No follow-ups on file.    Jose Hernandez MD  [facility] LAURIE Wolff is a 71 year old who presents for the following health issues : Issues as noted above assessment and plan.  Serous otitis of his left ear has been somewhat recurrent they have no antibiotic he was in the urgent care last week and because of his getting bad.  He is better now.  Had 5 days of Augmentin.  The other is pain medication continuing a tapering dose very slow.  But he is tolerating things well.  Pain is much more manageable.    HPI           Review of Systems   Constitutional, HEENT, cardiovascular, pulmonary, gi and gu systems are negative, except as otherwise noted.      Objective    /68 (BP Location: Left arm, Patient Position: Sitting)   Pulse 87   Temp 97  F (36.1  C) (Temporal)   Resp 18   Wt 87.2 kg (192 lb 4.8 oz)   SpO2 94%   BMI 29.24 kg/m    Body mass index is 29.24 kg/m .  Physical Exam   GENERAL: healthy, alert and no distress  EYES: Eyes grossly normal to inspection, PERRL and conjunctivae and sclerae normal  HENT: normal cephalic/atraumatic, right ear: normal: no effusions, no erythema, normal landmarks and left ear: M shows no erythema but no serous fluid behind not bulging.  Canal is clear.  NECK: Some scarring and a little bit of swelling noted on the left side yet not tender.  No significant nodes.  RESP: lungs clear to auscultation - no rales, rhonchi or wheezes  MS: no gross musculoskeletal defects noted, no edema  SKIN: no suspicious lesions or rashes  NEURO: Normal strength and tone, mentation intact and speech normal  PSYCH: mentation appears normal, affect normal/bright                "

## 2021-09-30 NOTE — PROGRESS NOTES
Outpatient Physical Therapy Discharge Note     Patient: Quan Murphy  : 1950    Beginning/End Dates of Reporting Period:  9/15/2021 to 2021    Referring Provider: Dr. Ligia Fang MD    Therapy Diagnosis: L neck lymphedema     Client Self Report: Has been in a lot of pain lately, has been on antibiotics for an ear drum issue lately.  Swelling in his throat feels like sunburn.  Has noticed decreased swelling in his neck, but hasn't noticed any difference in the left side of his face, said it seems to make it more sore when he does the MLD on that area.      Objective Measurements:  Objective Measure: Neck circumference - superior   Details: 47.9 cm     Objective Measure: Neck circumference - middle   Details: 45.3 cm     Objective Measure: Neck circumference - inferior   Details: 44.1 cm       Goals:  Goal Identifier 1   Goal Description Patient will demonstrate reduced girth of 2 cm at neck landmarks in order to demonstrate improvement in submandibular and anterior cervical edema.   Target Date 21   Date Met  21   Progress (detail required for progress note):  Demonstrates decreased head/neck volume, decreased L face volume.      Goal Identifier 2   Goal Description Patient will demonstrate independent ability to correct perform self MLD on head/neck in order to ensure maintenance of swelling reduction and improved drainage after discharge from therapy.    Target Date 21   Date Met  21   Progress (detail required for progress note):         Plan:  Discharge from therapy.    Discharge:    Reason for Discharge: Patient has met all goals.  Patient feels he has a good understanding of how to manage symptoms on his own at home, discussed what to look out for in the future and when to call back if there are any questions.      Equipment Issued: N/A    Discharge Plan: Patient to continue home program.        Shelley Bello, PT, DPT, CLT-BETO [facility]  Therapist     Phone: [phone]  Email: [email or web address]

## 2021-10-04 ENCOUNTER — TELEPHONE (OUTPATIENT)
Dept: RADIATION ONCOLOGY | Facility: CLINIC | Age: 71
End: 2021-10-04

## 2021-10-04 NOTE — TELEPHONE ENCOUNTER
New called today to say that he continues to have a sore left side of throat and plugged ear, even post antibiotics. Completed radiation treatment in June 2021 and was concerned about the length of time it is taking him to heal.

## 2021-10-12 DIAGNOSIS — F41.9 ANXIETY: ICD-10-CM

## 2021-10-13 RX ORDER — ALPRAZOLAM 0.5 MG
TABLET ORAL
Qty: 60 TABLET | Refills: 0 | Status: SHIPPED | OUTPATIENT
Start: 2021-10-13 | End: 2021-11-08

## 2021-10-13 NOTE — TELEPHONE ENCOUNTER
Xanax      Last Written Prescription Date:  9/17/201  Last Fill Quantity: 60,   # refills: 0  Last Office Visit: 9/30/2021  Future Office visit:    Next 5 appointments (look out 90 days)      Nov 09, 2021 11:30 AM  Return Visit with Aramis Ch MD  [facility] ([facility] ) [address] [place] [id number] [phone]     Dec 13, 2021  1:45 PM  (Arrive by 1:30 PM)  Return Visit with Leisa Zarate CNP  [facility] ([facility] ) [address] [place] [id number] [phone]             Routing refill request to provider for review/approval because:  Drug not on the [facility], [facility] or [facility] refill protocol or controlled substance

## 2021-10-19 ENCOUNTER — TELEPHONE (OUTPATIENT)
Dept: CARDIOLOGY | Facility: CLINIC | Age: 71
End: 2021-10-19

## 2021-10-19 NOTE — TELEPHONE ENCOUNTER
I called pt in response to Jennifer's note. Pt has multiple lab appts set up prior to different provider visits. Pt said the lab on 11/1 is for 's visit, 11/9 for , and 11/15 for . Pt will keep 11/9 lab visit with  for flp and bmp as he doesn't want to fast for the 11/1 lab visit. TSH added to 11/1 lab visit, as there is already an order from . I told pt he would need to check with 's nursing team after the 11/1 TSH results come back to see if he still needs 11/15 lab appt as I don't know what labs are needed for that visit. Nettie MIRANDA October 19, 2021, 4:37 PM

## 2021-10-19 NOTE — TELEPHONE ENCOUNTER
Reason for Call:  Other request    Detailed comments: Patient will be in to see Dr. Ch with lab work prior on November 9th and would like to ask Dr. Ch to add a thyroid lab as he is on different medication for this.      Phone Number Patient can be reached at: Home number on file [phone] (home) or Cell number on file:    Telephone Information:   Mobile [phone]       Best Time: any    Can we leave a detailed message on this number? YES    Call taken on 10/19/2021 at 3:57 PM by Jennifer Carter

## 2021-10-25 ENCOUNTER — MYC MEDICAL ADVICE (OUTPATIENT)
Dept: OTOLARYNGOLOGY | Facility: CLINIC | Age: 71
End: 2021-10-25

## 2021-10-27 ENCOUNTER — MYC REFILL (OUTPATIENT)
Dept: FAMILY MEDICINE | Facility: CLINIC | Age: 71
End: 2021-10-27

## 2021-10-27 DIAGNOSIS — G89.3 NEOPLASM RELATED PAIN: ICD-10-CM

## 2021-10-27 DIAGNOSIS — C09.9 SQUAMOUS CELL CARCINOMA OF LEFT TONSIL (H): ICD-10-CM

## 2021-10-27 NOTE — PROGRESS NOTES
"History of Present Illness - Quan Murphy is a 71 year old male presenting in clinic today for a recheck on Patient presents with:  RECHECK: mucus, sore throat    Patient with history of left tonsil cancer with a local metastasis to level 2 nodes status post chemoradiation.  He completed his radiation therapy in June.  However since still is very dry mouth problems with sense of taste more importantly presents because of pressure in his left ear.  Sometimes he is able to \"pop\" his ear feels slightly better but in between still feels hollow on the left side of the head with pressure.  Does appear to affect his hearing.  He denies any vertigo or dizziness.  He is being followed very carefully at the head neck oncology center at the [facility].        BP Readings from Last 1 Encounters:   09/30/21 112/68       BP noted to be well controlled today in office.     Quan IS NOT a smoker/uses chewing tobacco.        Past Medical History -   Past Medical History:   Diagnosis Date     Allergic rhinitis      Allergy, unspecified not elsewhere classified     Seasonal allergies, pollen, dust, smoke and animals     Antiplatelet or antithrombotic long-term use      Anxiety      Arthritis      Chest pain      Chronic sinusitis      Coronary atherosclerosis of unspecified type of vessel, native or graft     Coronary artery disease     Depressive disorder 1995     Gastroesophageal reflux disease 2020    Cleared with medication     Head injury 1954     Hiatal hernia 2015    Right Side     History of blood transfusion 12/15/2004    Prostate Surgery - My own blood     Hyperlipidemia      Hypertension      Inguinal hernia      Kidney problem 10/08/2017    Lithotripsy     Kidney stones      Malignant neoplasm of prostate (H)     Prostate cancer     Prostate cancer (H)        Current Medications -   Current Outpatient Medications:      ALPRAZolam (XANAX) 0.5 MG tablet, TAKE ONE TABLET BY MOUTH THREE TIMES A DAY AS NEEDED " FOR ANXIETY, Disp: 60 tablet, Rfl: 0     amoxicillin-clavulanate (AUGMENTIN-ES) 600-42.9 MG/5ML suspension, Take 5 mLs (600 mg) by mouth 2 times daily (Patient not taking: Reported on 9/17/2021), Disp: 200 mL, Rfl: 0     ASPIRIN ADULT LOW STRENGTH 81 MG EC tablet, TAKE ONE TABLET BY MOUTH ONCE DAILY (Patient not taking: Reported on 9/17/2021), Disp: 90 tablet, Rfl: 1     cetirizine (ZYRTEC) 10 MG tablet, Take 10 mg by mouth, Disp: , Rfl:      cevimeline (EVOXAC) 30 MG capsule, Take 1 capsule (30 mg) by mouth 3 times daily, Disp: 120 capsule, Rfl: 11     citalopram (CELEXA) 40 MG tablet, TAKE ONE TABLET BY MOUTH ONCE DAILY, Disp: 90 tablet, Rfl: 0     dronabinol (MARINOL) 2.5 MG capsule, Take 1 capsule (2.5 mg) by mouth 2 times daily (before meals), Disp: 28 capsule, Rfl: 3     fluconazole (DIFLUCAN) 200 MG tablet, Take 1 tablet (200 mg) by mouth daily, Disp: 7 tablet, Rfl: 0     HYDROcodone-acetaminophen (NORCO)  MG per tablet, Take 1-2 tablets by mouth every 4 hours as needed for severe pain . Take maximum of 6 tabs a day., Disp: 180 tablet, Rfl: 0     hydrOXYzine (VISTARIL) 25 MG capsule, Take 1 capsule (25 mg) by mouth 4 times daily as needed for anxiety, Disp: 120 capsule, Rfl: 4     ibuprofen (ADVIL/MOTRIN) 200 MG tablet, Take 200 mg by mouth, Disp: , Rfl:      levothyroxine (SYNTHROID/LEVOTHROID) 137 MCG tablet, Take 1 tablet (137 mcg) by mouth daily, Disp: 30 tablet, Rfl: 11     metoprolol succinate ER (TOPROL-XL) 50 MG 24 hr tablet, Take 1 tablet (50 mg) by mouth daily, Disp: 90 tablet, Rfl: 3     naloxone (NARCAN) 4 MG/0.1ML nasal spray, Spray 1 spray (4 mg) into one nostril alternating nostrils as needed for opioid reversal every 2-3 minutes until assistance arrives (Patient not taking: Reported on 9/30/2021), Disp: 0.2 mL, Rfl:      nitroGLYcerin (NITROSTAT) 0.4 MG sublingual tablet, For chest pain place 1 tablet under the tongue every 5 minutes for 3 doses. If symptoms persist 5 minutes after 1st  dose call 911. (Patient not taking: Reported on 9/30/2021), Disp: 25 tablet, Rfl: 0     nystatin (MYCOSTATIN) 753296 UNIT/ML suspension, Take 5 mLs (500,000 Units) by mouth 4 times daily (Patient not taking: Reported on 9/30/2021), Disp: 240 mL, Rfl: 0     omeprazole (PRILOSEC) 40 MG DR capsule, Take 1 capsule (40 mg) by mouth daily (Patient not taking: Reported on 9/30/2021), Disp: 90 capsule, Rfl: 3     pilocarpine (SALAGEN) 5 MG tablet, Take 1 tablet (5 mg) by mouth 3 times daily, Disp: 90 tablet, Rfl: 3     prochlorperazine (COMPAZINE) 5 MG tablet, TAKE 1 TABLET BY MOUTH EVERY 6 HOURS AS NEEDED FOR NAUSEA OR VOMITING, Disp: , Rfl:      rosuvastatin (CRESTOR) 40 MG tablet, Take 1 tablet (40 mg) by mouth daily, Disp: 90 tablet, Rfl: 0     testosterone (ANDROGEL 1.62 % PUMP) 20.25 MG/ACT gel, testosterone 20.25 mg/1.25 gram (1.62 %) transdermal gel pump, Disp: , Rfl:      zolpidem ER (AMBIEN CR) 12.5 MG CR tablet, Take 1 tablet (12.5 mg) by mouth nightly as needed for sleep, Disp: 30 tablet, Rfl: 3    Allergies -   Allergies   Allergen Reactions     Animal Dander      Azithromycin Nausea and Vomiting     Dust Mites      Pollen Extract      Smoke.        Social History -   Social History     Socioeconomic History     Marital status:      Spouse name: Alessandra     Number of children: 2     Years of education: Not on file     Highest education level: Not on file   Occupational History     Occupation: Retired   Tobacco Use     Smoking status: Never Smoker     Smokeless tobacco: Never Used   Substance and Sexual Activity     Alcohol use: Yes     Alcohol/week: 8.3 standard drinks     Types: 10 Cans of beer per week     Comment: occasional     Drug use: No     Sexual activity: Yes     Partners: Female     Birth control/protection: Female Surgical   Other Topics Concern      Service Not Asked     Blood Transfusions Not Asked     Caffeine Concern Not Asked     Occupational Exposure Not Asked     Hobby Hazards Not  Asked     Sleep Concern Not Asked     Stress Concern Not Asked     Weight Concern Not Asked     Special Diet Not Asked     Back Care Not Asked     Exercise Not Asked     Bike Helmet Not Asked     Seat Belt Not Asked     Self-Exams Not Asked     Parent/sibling w/ CABG, MI or angioplasty before 65F 55M? No   Social History Narrative     Not on file     Social Determinants of Health     Financial Resource Strain:      Difficulty of Paying Living Expenses:    Food Insecurity:      Worried About Running Out of Food in the Last Year:      Ran Out of Food in the Last Year:    Transportation Needs:      Lack of Transportation (Medical):      Lack of Transportation (Non-Medical):    Physical Activity:      Days of Exercise per Week:      Minutes of Exercise per Session:    Stress:      Feeling of Stress :    Social Connections:      Frequency of Communication with Friends and Family:      Frequency of Social Gatherings with Friends and Family:      Attends [employer] Services:      Active Member of Clubs or Organizations:      Attends Club or Organization Meetings:      Marital Status:    Intimate Partner Violence:      Fear of Current or Ex-Partner:      Emotionally Abused:      Physically Abused:      Sexually Abused:        Family History -   Family History   Problem Relation Age of Onset     Hypertension Father         Lived to age 87     Connective Tissue Disorder Mother         LUPUS     Heart Disease Mother         Valve replacement     Anxiety Disorder Mother         Lived to age 84     Dementia Mother         Nursing Home (lived to age 86)       Review of Systems - As per HPI and PMHx, otherwise review of system review of the head and neck negative. Otherwise 10+ review of system is negative    Physical Exam  There were no vitals taken for this visit.  BMI: There is no height or weight on file to calculate BMI.    General - The patient is well nourished and well developed, and appears to have good nutritional status.   Alert and oriented to person and place, answers questions and cooperates with examination appropriately.    SKIN - No suspicious lesions or rashes.  Respiration - No respiratory distress.  Head and Face - Normocephalic and atraumatic, with no gross asymmetry noted of the contour of the facial features.  The facial nerve is intact, with strong symmetric movements.    Voice and Breathing - The patient was breathing comfortably without the use of accessory muscles. The patients voice was clear and strong, and had appropriate pitch and quality.    Ears - Bilateral pinna and EACs with normal appearing overlying skin.  Right tympanic membrane intact with good mobility on pneumatic otoscopy y. Bony landmarks of the ossicular chain are normal. The tympanic membranes are normal in appearance. No retraction, perforation, or masses.  No fluid or purulence was seen in the external canal or the middle ear.  On the left side tympanic membrane somewhat retracted large amount of serous fluid seen behind it.  Ear canal appears to be clear and dry.    Eyes - Extraocular movements intact.  Sclera were not icteric or injected, conjunctiva were pink and moist.    Mouth - Examination of the oral cavity showed pale, somewhat dry  oral mucosa. No lesions or ulcerations noted.  The tongue was mobile and midline, and the dentition were in good condition.      Throat - The walls of the oropharynx were smooth, pink, but dry , symmetric, and had no lesions or ulcerations.  The tonsillar pillars and soft palate were symmetric. Tonsils are 1+. The uvula was midline on elevation.    Neck - Normal midline excursion of the laryngotracheal complex during swallowing.  Full range of motion on passive movement.  Palpation of the occipital, submental, submandibular, internal jugular chain, and supraclavicular nodes did not demonstrate any abnormal lymph nodes or masses.  The carotid pulse was palpable bilaterally.  Palpation of the thyroid was soft and  smooth, with no nodules or goiter appreciated.  The trachea was mobile and midline.    Nose - External contour is symmetric, no gross deflection or scars.  Nasal mucosa is pink and moist with no abnormal mucus.  The septum was midline and non-obstructive, turbinates of normal size and position.  No polyps, masses, or purulence noted on examination.    Neuro - Nonfocal neuro exam is normal, CN 2 through 12 intact, normal gait and muscle tone.      Performed in clinic today: This point discussed further therapy of his ear.  Considering how uncomfortable he has we discussed attentional myringotomy during the centesis and then the if symptoms recur when control perforation closes potential placement with tube.  He understands risks and benefits of myringotomy and wished to have it done.  Patient is topically anesthetized under the guidance of magnified speculum in the posterior inferior quadrant.  Myringotomy blade is used to make a small radial incision.  Large amount of serous fluid is suctioned.  Patient tolerated procedure well and feels immediately better with most pressure gone and hearing improved.        A/P - Quan ALFREDA Jeffrey is a 71 year old male Patient presents with:  RECHECK: mucus, sore throat    Patient status post radiation therapy 4 months ago has chronic serous otitis media involving left ear.  At this point we performed successful myringotomy and myringotomy centesis.  He feels much better now.  Would like to reevaluate in 3 weeks.  Quan should follow up in 3 weeks at which point ventilation to be considered.    At Quan next appointment they will need a hearing test.      Too Christensen MD

## 2021-10-27 NOTE — TELEPHONE ENCOUNTER
HYDROcodone-acetaminophen (NORCO)  MG per tablet    Next 5 appointments (look out 90 days)    Nov 04, 2021 10:45 AM  Return Visit with Too Christensen MD  [facility] ([facility] ) [address] [place] [id number]  [phone]   Nov 09, 2021 11:30 AM  Return Visit with Aramis Ch MD  [facility] ([facility] ) [address] [place] [id number]  [phone]   Dec 13, 2021  1:45 PM  (Arrive by 1:30 PM)  Return Visit with Leisa Zarate CNP  [facility] ([facility] ) [address] [place] [id number] [phone]           Routing refill request to provider for review/approval because:  Drug not on the [facility], [facility] or [facility] refill protocol or controlled substance  Lauren Finley MA

## 2021-10-28 RX ORDER — HYDROCODONE BITARTRATE AND ACETAMINOPHEN 10; 325 MG/1; MG/1
1-2 TABLET ORAL EVERY 4 HOURS PRN
Qty: 180 TABLET | Refills: 0 | Status: SHIPPED | OUTPATIENT
Start: 2021-10-28 | End: 2021-11-24

## 2021-11-01 ENCOUNTER — LAB (OUTPATIENT)
Dept: LAB | Facility: CLINIC | Age: 71
End: 2021-11-01
Payer: MEDICARE

## 2021-11-01 DIAGNOSIS — C09.9 SQUAMOUS CELL CARCINOMA OF LEFT TONSIL (H): ICD-10-CM

## 2021-11-01 DIAGNOSIS — Z85.46 PERSONAL HISTORY OF MALIGNANT NEOPLASM OF PROSTATE: ICD-10-CM

## 2021-11-01 DIAGNOSIS — E03.4 HYPOTHYROIDISM DUE TO ACQUIRED ATROPHY OF THYROID: ICD-10-CM

## 2021-11-01 LAB
ALBUMIN SERPL-MCNC: 3.4 G/DL (ref 3.4–5)
ALP SERPL-CCNC: 78 U/L (ref 40–150)
ALT SERPL W P-5'-P-CCNC: 76 U/L (ref 0–70)
ANION GAP SERPL CALCULATED.3IONS-SCNC: 5 MMOL/L (ref 3–14)
AST SERPL W P-5'-P-CCNC: 34 U/L (ref 0–45)
BASOPHILS # BLD AUTO: 0 10E3/UL (ref 0–0.2)
BASOPHILS NFR BLD AUTO: 1 %
BILIRUB SERPL-MCNC: 0.7 MG/DL (ref 0.2–1.3)
BUN SERPL-MCNC: 27 MG/DL (ref 7–30)
CALCIUM SERPL-MCNC: 8.9 MG/DL (ref 8.5–10.1)
CHLORIDE BLD-SCNC: 103 MMOL/L (ref 94–109)
CO2 SERPL-SCNC: 31 MMOL/L (ref 20–32)
CREAT SERPL-MCNC: 1.09 MG/DL (ref 0.66–1.25)
EOSINOPHIL # BLD AUTO: 0.1 10E3/UL (ref 0–0.7)
EOSINOPHIL NFR BLD AUTO: 2 %
ERYTHROCYTE [DISTWIDTH] IN BLOOD BY AUTOMATED COUNT: 13.3 % (ref 10–15)
GFR SERPL CREATININE-BSD FRML MDRD: 68 ML/MIN/1.73M2
GLUCOSE BLD-MCNC: 79 MG/DL (ref 70–99)
HCT VFR BLD AUTO: 47.3 % (ref 40–53)
HGB BLD-MCNC: 15.1 G/DL (ref 13.3–17.7)
IMM GRANULOCYTES # BLD: 0 10E3/UL
IMM GRANULOCYTES NFR BLD: 0 %
LYMPHOCYTES # BLD AUTO: 0.5 10E3/UL (ref 0.8–5.3)
LYMPHOCYTES NFR BLD AUTO: 9 %
MAGNESIUM SERPL-MCNC: 2.2 MG/DL (ref 1.6–2.3)
MCH RBC QN AUTO: 28.6 PG (ref 26.5–33)
MCHC RBC AUTO-ENTMCNC: 31.9 G/DL (ref 31.5–36.5)
MCV RBC AUTO: 90 FL (ref 78–100)
MONOCYTES # BLD AUTO: 0.6 10E3/UL (ref 0–1.3)
MONOCYTES NFR BLD AUTO: 11 %
NEUTROPHILS # BLD AUTO: 4.4 10E3/UL (ref 1.6–8.3)
NEUTROPHILS NFR BLD AUTO: 77 %
NRBC # BLD AUTO: 0 10E3/UL
NRBC BLD AUTO-RTO: 0 /100
PLATELET # BLD AUTO: 164 10E3/UL (ref 150–450)
POTASSIUM BLD-SCNC: 4.1 MMOL/L (ref 3.4–5.3)
PROT SERPL-MCNC: 6.9 G/DL (ref 6.8–8.8)
PSA SERPL-MCNC: 0.29 UG/L (ref 0–4)
RBC # BLD AUTO: 5.28 10E6/UL (ref 4.4–5.9)
SODIUM SERPL-SCNC: 139 MMOL/L (ref 133–144)
TSH SERPL DL<=0.005 MIU/L-ACNC: 1.2 MU/L (ref 0.4–4)
WBC # BLD AUTO: 5.7 10E3/UL (ref 4–11)

## 2021-11-01 PROCEDURE — 83735 ASSAY OF MAGNESIUM: CPT

## 2021-11-01 PROCEDURE — 84153 ASSAY OF PSA TOTAL: CPT

## 2021-11-01 PROCEDURE — 84443 ASSAY THYROID STIM HORMONE: CPT

## 2021-11-01 PROCEDURE — 80053 COMPREHEN METABOLIC PANEL: CPT

## 2021-11-01 PROCEDURE — 36415 COLL VENOUS BLD VENIPUNCTURE: CPT

## 2021-11-01 PROCEDURE — 85025 COMPLETE CBC W/AUTO DIFF WBC: CPT

## 2021-11-02 DIAGNOSIS — I10 BENIGN ESSENTIAL HYPERTENSION: ICD-10-CM

## 2021-11-02 RX ORDER — METOPROLOL SUCCINATE 50 MG/1
50 TABLET, EXTENDED RELEASE ORAL DAILY
Qty: 90 TABLET | Refills: 0 | Status: SHIPPED | OUTPATIENT
Start: 2021-11-02 | End: 2022-02-01

## 2021-11-03 ENCOUNTER — OFFICE VISIT (OUTPATIENT)
Dept: UROLOGY | Facility: CLINIC | Age: 71
End: 2021-11-03
Payer: MEDICARE

## 2021-11-03 VITALS
WEIGHT: 207.1 LBS | RESPIRATION RATE: 16 BRPM | DIASTOLIC BLOOD PRESSURE: 73 MMHG | BODY MASS INDEX: 31.49 KG/M2 | HEART RATE: 75 BPM | SYSTOLIC BLOOD PRESSURE: 118 MMHG | OXYGEN SATURATION: 97 %

## 2021-11-03 DIAGNOSIS — E29.1 HYPOGONADISM MALE: ICD-10-CM

## 2021-11-03 DIAGNOSIS — C61 MALIGNANT NEOPLASM OF PROSTATE (H): Primary | ICD-10-CM

## 2021-11-03 DIAGNOSIS — N21.0 BLADDER STONE: ICD-10-CM

## 2021-11-03 DIAGNOSIS — Z87.442 HISTORY OF RENAL STONE: ICD-10-CM

## 2021-11-03 LAB
ALBUMIN UR-MCNC: NEGATIVE MG/DL
APPEARANCE UR: CLEAR
BILIRUB UR QL STRIP: NEGATIVE
COLOR UR AUTO: YELLOW
GLUCOSE UR STRIP-MCNC: NEGATIVE MG/DL
HGB UR QL STRIP: NEGATIVE
KETONES UR STRIP-MCNC: NEGATIVE MG/DL
LEUKOCYTE ESTERASE UR QL STRIP: NEGATIVE
NITRATE UR QL: NEGATIVE
PH UR STRIP: 6 [PH] (ref 5–7)
SP GR UR STRIP: 1.01 (ref 1–1.03)
UROBILINOGEN UR STRIP-MCNC: NORMAL MG/DL

## 2021-11-03 PROCEDURE — 99204 OFFICE O/P NEW MOD 45 MIN: CPT | Mod: 25 | Performed by: UROLOGY

## 2021-11-03 PROCEDURE — 51798 US URINE CAPACITY MEASURE: CPT | Performed by: UROLOGY

## 2021-11-03 PROCEDURE — 81003 URINALYSIS AUTO W/O SCOPE: CPT | Performed by: UROLOGY

## 2021-11-03 PROCEDURE — 52000 CYSTOURETHROSCOPY: CPT | Performed by: UROLOGY

## 2021-11-03 ASSESSMENT — PAIN SCALES - GENERAL: PAINLEVEL: NO PAIN (0)

## 2021-11-03 NOTE — PROGRESS NOTES
S: Patient is a pleasant 71-year-old gentleman who was seen for a consultation with regard to several urological issues.  First of all patient has history of prostate cancer status post radical retropubic prostatectomy a number of years ago.  He was seen by Dr. Torres in the past.  His PSA however has been rising.  In 2018 it was 0.09, in 2019 0.1, in 2020 0.14, in 2021 0.29.  He has been on testosterone replacement therapy for about 5 years.  He uses AndroGel.  This is for hypogonadism.  Patient underwent treatment for tonsillar cancer recently with his PET scan showing evidence of bladder stones.  Patient denies any problems with urination.  Current Outpatient Medications   Medication Sig Dispense Refill     ALPRAZolam (XANAX) 0.5 MG tablet TAKE ONE TABLET BY MOUTH THREE TIMES A DAY AS NEEDED FOR ANXIETY 60 tablet 0     cetirizine (ZYRTEC) 10 MG tablet Take 10 mg by mouth       cevimeline (EVOXAC) 30 MG capsule Take 1 capsule (30 mg) by mouth 3 times daily 120 capsule 11     citalopram (CELEXA) 40 MG tablet TAKE ONE TABLET BY MOUTH ONCE DAILY 90 tablet 0     dronabinol (MARINOL) 2.5 MG capsule Take 1 capsule (2.5 mg) by mouth 2 times daily (before meals) 28 capsule 3     HYDROcodone-acetaminophen (NORCO)  MG per tablet Take 1-2 tablets by mouth every 4 hours as needed for severe pain . Take maximum of 6 tabs a day. 180 tablet 0     hydrOXYzine (VISTARIL) 25 MG capsule Take 1 capsule (25 mg) by mouth 4 times daily as needed for anxiety 120 capsule 4     ibuprofen (ADVIL/MOTRIN) 200 MG tablet Take 200 mg by mouth       levothyroxine (SYNTHROID/LEVOTHROID) 137 MCG tablet Take 1 tablet (137 mcg) by mouth daily 30 tablet 11     metoprolol succinate ER (TOPROL-XL) 50 MG 24 hr tablet Take 1 tablet (50 mg) by mouth daily 90 tablet 0     pilocarpine (SALAGEN) 5 MG tablet Take 1 tablet (5 mg) by mouth 3 times daily 90 tablet 3     prochlorperazine (COMPAZINE) 5 MG tablet TAKE 1 TABLET BY MOUTH EVERY 6 HOURS AS NEEDED  FOR NAUSEA OR VOMITING       rosuvastatin (CRESTOR) 40 MG tablet Take 1 tablet (40 mg) by mouth daily 90 tablet 0     testosterone (ANDROGEL 1.62 % PUMP) 20.25 MG/ACT gel testosterone 20.25 mg/1.25 gram (1.62 %) transdermal gel pump       zolpidem ER (AMBIEN CR) 12.5 MG CR tablet Take 1 tablet (12.5 mg) by mouth nightly as needed for sleep 30 tablet 3     amoxicillin-clavulanate (AUGMENTIN-ES) 600-42.9 MG/5ML suspension Take 5 mLs (600 mg) by mouth 2 times daily (Patient not taking: Reported on 9/17/2021) 200 mL 0     ASPIRIN ADULT LOW STRENGTH 81 MG EC tablet TAKE ONE TABLET BY MOUTH ONCE DAILY (Patient not taking: Reported on 9/17/2021) 90 tablet 1     fluconazole (DIFLUCAN) 200 MG tablet Take 1 tablet (200 mg) by mouth daily (Patient not taking: Reported on 11/3/2021) 7 tablet 0     naloxone (NARCAN) 4 MG/0.1ML nasal spray Spray 1 spray (4 mg) into one nostril alternating nostrils as needed for opioid reversal every 2-3 minutes until assistance arrives (Patient not taking: Reported on 9/30/2021) 0.2 mL      nitroGLYcerin (NITROSTAT) 0.4 MG sublingual tablet For chest pain place 1 tablet under the tongue every 5 minutes for 3 doses. If symptoms persist 5 minutes after 1st dose call 911. (Patient not taking: Reported on 9/30/2021) 25 tablet 0     nystatin (MYCOSTATIN) 503676 UNIT/ML suspension Take 5 mLs (500,000 Units) by mouth 4 times daily (Patient not taking: Reported on 9/30/2021) 240 mL 0     omeprazole (PRILOSEC) 40 MG DR capsule Take 1 capsule (40 mg) by mouth daily (Patient not taking: Reported on 9/30/2021) 90 capsule 3     Allergies   Allergen Reactions     Animal Dander      Azithromycin Nausea and Vomiting     Dust Mites      Pollen Extract      Smoke.      Past Medical History:   Diagnosis Date     Allergic rhinitis      Allergy, unspecified not elsewhere classified     Seasonal allergies, pollen, dust, smoke and animals     Antiplatelet or antithrombotic long-term use      Anxiety      Arthritis       Chest pain      Chronic sinusitis      Coronary atherosclerosis of unspecified type of vessel, native or graft     Coronary artery disease     Depressive disorder 1995     Gastroesophageal reflux disease 2020    Cleared with medication     Head injury 1954     Hiatal hernia 2015    Right Side     History of blood transfusion 12/15/2004    Prostate Surgery - My own blood     Hyperlipidemia      Hypertension      Inguinal hernia      Kidney problem 10/08/2017    Lithotripsy     Kidney stones      Malignant neoplasm of prostate (H)     Prostate cancer     Prostate cancer (H)      Past Surgical History:   Procedure Laterality Date     ARTHRODESIS FOOT  7/23/2013    Procedure: ARTHRODESIS FOOT;  Great Toe Arthrodesis Left Foot;  Surgeon: Ash Gonzalez DPM;  Location: [facility] OR     ARTHRODESIS FOOT  6/10/2014    Procedure: ARTHRODESIS FOOT;  Surgeon: Ash Gonzalez DPM;  Location: [facility] OR     BIOPSY  10/1/2004    dermatologist biopsies     C LAPAROSCOPY, SURGICAL PROSTATECTOMY, RETROPUBIC RADICAL, W/NERVE SPARING  11/30/2004    With full bilateral pelvic lymphadenectomy.  [facility].     C TOTAL KNEE ARTHROPLASTY  05/01/08    Left knee     COLONOSCOPY  10/7/2013    Procedure: COLONOSCOPY;  Colonoscopy;  Surgeon: Mike Fallon MD;  Location:  GI     ESOPHAGOSCOPY, GASTROSCOPY, DUODENOSCOPY (EGD), COMBINED N/A 2/12/2020    Procedure: ESOPHAGOGASTRODUODENOSCOPY (EGD);  Surgeon: Sam Escobar MD;  Location: [facility]     EXTRACORPOREAL SHOCK WAVE LITHOTRIPSY (ESWL) Bilateral 10/18/2017    Procedure: EXTRACORPOREAL SHOCK WAVE LITHOTRIPSY (ESWL);  BILATERAL EXTRACORPOREAL SHOCKWAVE LITHOTRIPSY ;  Surgeon: Meir Torres MD;  Location: [facility] OR     HC CORRECT BUNION,SIMPLE  08/11/2005    x3     HC REMV TOE BENIGN BONE LESN  08/11/2005     HEAD & NECK SURGERY  1954    From a fall     HERNIORRHAPHY INGUINAL  7/3/2013    Procedure: HERNIORRHAPHY INGUINAL;  Open Repair Inguinal hernia Right with mesh ;  Surgeon: Luz  MD Sam;  Location: [facility] OR     IR GASTROSTOMY TUBE PERCUTANEOUS PLCMNT  6/7/2021     MOHS MICROGRAPHIC PROCEDURE  08/23/11    ear and chin-[facility]     OPEN REDUCTION INTERNAL FIXATION WRIST Right 7/18/2017    Procedure: OPEN REDUCTION INTERNAL FIXATION WRIST;  Right distal radius open reduction and internal fixation;  Surgeon: Pedro Blanca DO;  Location: [facility]     RECONSTRUCT FOREFOOT WITH METATARSOPHALANGEAL (MTP) FUSION  6/10/2014    Procedure: RECONSTRUCT FOREFOOT WITH METATARSOPHALANGEAL (MTP) FUSION;  Surgeon: Ash Gonzalez DPM;  Location: [facility] OR     STENT, CORONARY, DEMI       SURGICAL HISTORY OF -   1999/1974    lt knee     SURGICAL HISTORY OF -   10/2004    lithotripsy     SURGICAL HISTORY OF - 11/05    angiogram with stent     VASCULAR SURGERY  11/17/2005    Puncture of iliac artery during and angiogram      Family History   Problem Relation Age of Onset     Hypertension Father         Lived to age 87     Connective Tissue Disorder Mother         LUPUS     Heart Disease Mother         Valve replacement     Anxiety Disorder Mother         Lived to age 84     Dementia Mother         Nursing Home (lived to age 86)     Social History     Socioeconomic History     Marital status:      Spouse name: Alessandra     Number of children: 2     Years of education: None     Highest education level: None   Occupational History     Occupation: Retired   Tobacco Use     Smoking status: Never Smoker     Smokeless tobacco: Never Used   Substance and Sexual Activity     Alcohol use: Yes     Alcohol/week: 8.3 standard drinks     Types: 10 Cans of beer per week     Comment: occasional     Drug use: No     Sexual activity: Yes     Partners: Female     Birth control/protection: Female Surgical   Other Topics Concern      Service Not Asked     Blood Transfusions Not Asked     Caffeine Concern Not Asked     Occupational Exposure Not Asked     Hobby Hazards Not Asked     Sleep Concern  Not Asked     Stress Concern Not Asked     Weight Concern Not Asked     Special Diet Not Asked     Back Care Not Asked     Exercise Not Asked     Bike Helmet Not Asked     Seat Belt Not Asked     Self-Exams Not Asked     Parent/sibling w/ CABG, MI or angioplasty before 65F 55M? No   Social History Narrative     None     Social Determinants of Health     Financial Resource Strain:      Difficulty of Paying Living Expenses:    Food Insecurity:      Worried About Running Out of Food in the Last Year:      Ran Out of Food in the Last Year:    Transportation Needs:      Lack of Transportation (Medical):      Lack of Transportation (Non-Medical):    Physical Activity:      Days of Exercise per Week:      Minutes of Exercise per Session:    Stress:      Feeling of Stress :    Social Connections:      Frequency of Communication with Friends and Family:      Frequency of Social Gatherings with Friends and Family:      Attends Scientologist Services:      Active Member of Clubs or Organizations:      Attends Club or Organization Meetings:      Marital Status:    Intimate Partner Violence:      Fear of Current or Ex-Partner:      Emotionally Abused:      Physically Abused:      Sexually Abused:        REVIEW OF SYSTEMS  =================  C: NEGATIVE for fever, chills, change in weight  I: NEGATIVE for worrisome rashes, moles or lesions  E/M: NEGATIVE for ear, mouth and throat problems  R: NEGATIVE for significant cough or SHORTNESS OF BREATH  CV:  NEGATIVE for chest pain, palpitations or peripheral edema  GI: NEGATIVE for nausea, abdominal pain, heartburn, or change in bowel habits  NEURO: NEGATIVE numbness/weakness  : see HPI  PSYCH: NEGATIVE depression/anxiety  LYmph: no new enlarged lymph nodes  Ortho: no new trauma/movements      Physical Exam:  /73 (BP Location: Right arm, Patient Position: Sitting, Cuff Size: Adult Regular)   Pulse 75   Resp 16   Wt 93.9 kg (207 lb 1.6 oz)   SpO2 97%   BMI 31.49 kg/m      Patient is pleasant, in no acute distress, good general condition.  Heart:  negative, PMI normal  Lung: no evidence of respiratory distress    Abdomen: Soft, nondistended, non tender. No masses. No rebound or guarding.   Exam: No CVA tenderness.  Penis no discharge.  Testes no masses.  No scrotal skin lesion.  Skin: Warm and dry.  No redness.  Neuro: grossly normal  Musculaskeletal: moving all extremities  Psych normal mood and affect  Musculoskeletal  moving all extremities  Hematologic/Lymphatic/Immunologic: normal ant/post cervical, axillary, supraclavicular and inguinal nodes      Patient is draped and prepped.  Flexible cystoscopy placed under direct vision.      The anterior urethra is normal   The prostate is missing.       In the bladder there are 2 bladder stones seen.    Assessment/Plan:  (C61) Malignant neoplasm of prostate (H)  (primary encounter diagnosis)  Comment:    Plan: Discussed PSA elevation after radical retropubic prostatectomy.  Patient needs to stop taking AndroGel at this time since it may be why his cancer is growing.  We will recheck PSA in 3 months after stopping AndroGel.    (N21.0) Bladder stone  Comment:    Plan: CYSTOURETHROSCOPY (67211)            We will remove sometime next year.    (E29.1) Hypogonadism male  Comment:    Plan: Stop AndroGel.  Withdrawal symptoms discussed.    (Z87.237) History of renal stone  Comment:    Plan: No renal stones seen on recent CT scan

## 2021-11-03 NOTE — PATIENT INSTRUCTIONS
Your next cystoscopy is scheduled on 1/5/22 at 8:30 am. Please arrive 15 minutes prior to the time listed. Please call [phone] if you need to reschedule this appointment.          Patient Education     Cystoscopy    Cystoscopy is a procedure that lets your healthcare provider look directly inside your urethra and bladder. It can be used to:     Help diagnose a problem with your urethra, bladder, or kidneys.    Take a sample (biopsy) of bladder or urethral tissue.    Treat certain problems (such as removing kidney stones).    Place a tube (stent) to bypass a blockage.    Take special X-rays of the kidneys.  Based on the findings, your provider may advise other tests or treatments.   What is a cystoscope?  A cystoscope is a telescope-like tube that contains lenses and small glass wires that make bright light (fiberoptics). The cystoscope may be straight and rigid. Or it may be flexible to bend around curves in the urethra. The healthcare provider may look directly into the cystoscope, or project the image onto a screen.   Getting ready    Ask your healthcare provider if you should stop taking any medicines before the procedure.    Follow any directions you are given for not eating or drinking before the procedure.    Follow any other instructions your healthcare provider gives you.    Tell your healthcare provider before the exam if you:     Take any medicines, such as aspirin or blood thinners. This also includes any over-the-counter and prescription medicines, vitamins, herbs, and other supplements.    Have allergies to any medicines    Are pregnant or think you may be pregnant    During the procedure  Cystoscopy is done in the healthcare provider s office, surgery center, or hospital. The doctor and a nurse are present during the procedure. It takes only a few minutes. It takes longer if a biopsy, X-ray, or treatment needs to be done.   During the procedure:    You lie on an exam table on your back, knees bent  and legs apart. You are covered with a drape.    Your urethra and the area around it are washed. Anesthetic jelly may be applied to numb the urethra. Other pain medicine is often not needed. In some cases, you may be offered a mild sedative to help you relax. If a more extensive procedure is to be done, such as a biopsy or kidney stone removal, general anesthesia may be needed. Often a one-time antibiotic is given.    The cystoscope is inserted. A sterile fluid is put into the bladder to expand it. You may feel pressure from this fluid.    When the procedure is done, the cystoscope is removed.  After the procedure  If you had a sedative, general anesthesia, or spinal anesthesia, you must have someone drive you home. Once you re home:     Drink plenty of fluids.    You may have burning or light bleeding when you urinate. This is normal.    Medicines may be prescribed to ease any mild pain or prevent infection. Take these as directed.    Call your healthcare provider if you have heavy bleeding or blood clots, burning that lasts more than a day, a fever over  100  F  ( 38 C ), or trouble urinating.  [employer] last reviewed this educational content on 10/1/2019    8193-3403 [employer]. All rights reserved. This information is not intended as a substitute for professional medical care. Always follow your healthcare professional's instructions.

## 2021-11-03 NOTE — PROGRESS NOTES
Bladder Scan performed. 78 mL maximum residual urine detected after 3 scans. MD informed. Elsy Leahy RN, BSN [facility]

## 2021-11-04 ENCOUNTER — OFFICE VISIT (OUTPATIENT)
Dept: OTOLARYNGOLOGY | Facility: CLINIC | Age: 71
End: 2021-11-04
Payer: MEDICARE

## 2021-11-04 VITALS
BODY MASS INDEX: 31.22 KG/M2 | HEIGHT: 68 IN | TEMPERATURE: 96.5 F | SYSTOLIC BLOOD PRESSURE: 100 MMHG | WEIGHT: 206 LBS | DIASTOLIC BLOOD PRESSURE: 60 MMHG

## 2021-11-04 DIAGNOSIS — H65.22 LEFT CHRONIC SEROUS OTITIS MEDIA: ICD-10-CM

## 2021-11-04 DIAGNOSIS — Z85.818 HISTORY OF CANCER TONSIL: Primary | ICD-10-CM

## 2021-11-04 PROCEDURE — 69420 INCISION OF EARDRUM: CPT | Mod: LT | Performed by: OTOLARYNGOLOGY

## 2021-11-04 PROCEDURE — 99213 OFFICE O/P EST LOW 20 MIN: CPT | Mod: 25 | Performed by: OTOLARYNGOLOGY

## 2021-11-04 ASSESSMENT — MIFFLIN-ST. JEOR: SCORE: 1668.91

## 2021-11-04 NOTE — LETTER
"    11/4/2021         RE: Quan Murphy  [address] [facility] [address]        Dear Colleague,    Thank you for referring your patient, Quan Murphy, to the [facility]. Please see a copy of my visit note below.    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  He completed his radiation therapy in June.  However since still is very dry mouth problems with sense of taste more importantly presents because of pressure in his left ear.  Sometimes he is able to \"pop\" his ear feels slightly better but in between still feels hollow on the left side of the head with pressure.  Does appear to affect his hearing.  He denies any vertigo or dizziness.  He is being followed very carefully at the head neck oncology center at the [facility].        BP Readings from Last 1 Encounters:   09/30/21 112/68       BP noted to be well controlled today in office.     Quan IS NOT a smoker/uses chewing tobacco.        Past Medical History -   Past Medical History:   Diagnosis Date     Allergic rhinitis      Allergy, unspecified not elsewhere classified     Seasonal allergies, pollen, dust, smoke and animals     Antiplatelet or antithrombotic long-term use      Anxiety      Arthritis      Chest pain      Chronic sinusitis      Coronary atherosclerosis of unspecified type of vessel, native or graft     Coronary artery disease     Depressive disorder 1995     Gastroesophageal reflux disease 2020    Cleared with medication     Head injury 1954     Hiatal hernia 2015    Right Side     History of blood transfusion 12/15/2004    Prostate Surgery - My own blood     Hyperlipidemia      Hypertension      Inguinal hernia      Kidney problem 10/08/2017    Lithotripsy     Kidney " stones      Malignant neoplasm of prostate (H)     Prostate cancer     Prostate cancer (H)        Current Medications -   Current Outpatient Medications:      ALPRAZolam (XANAX) 0.5 MG tablet, TAKE ONE TABLET BY MOUTH THREE TIMES A DAY AS NEEDED FOR ANXIETY, Disp: 60 tablet, Rfl: 0     amoxicillin-clavulanate (AUGMENTIN-ES) 600-42.9 MG/5ML suspension, Take 5 mLs (600 mg) by mouth 2 times daily (Patient not taking: Reported on 9/17/2021), Disp: 200 mL, Rfl: 0     ASPIRIN ADULT LOW STRENGTH 81 MG EC tablet, TAKE ONE TABLET BY MOUTH ONCE DAILY (Patient not taking: Reported on 9/17/2021), Disp: 90 tablet, Rfl: 1     cetirizine (ZYRTEC) 10 MG tablet, Take 10 mg by mouth, Disp: , Rfl:      cevimeline (EVOXAC) 30 MG capsule, Take 1 capsule (30 mg) by mouth 3 times daily, Disp: 120 capsule, Rfl: 11     citalopram (CELEXA) 40 MG tablet, TAKE ONE TABLET BY MOUTH ONCE DAILY, Disp: 90 tablet, Rfl: 0     dronabinol (MARINOL) 2.5 MG capsule, Take 1 capsule (2.5 mg) by mouth 2 times daily (before meals), Disp: 28 capsule, Rfl: 3     fluconazole (DIFLUCAN) 200 MG tablet, Take 1 tablet (200 mg) by mouth daily, Disp: 7 tablet, Rfl: 0     HYDROcodone-acetaminophen (NORCO)  MG per tablet, Take 1-2 tablets by mouth every 4 hours as needed for severe pain . Take maximum of 6 tabs a day., Disp: 180 tablet, Rfl: 0     hydrOXYzine (VISTARIL) 25 MG capsule, Take 1 capsule (25 mg) by mouth 4 times daily as needed for anxiety, Disp: 120 capsule, Rfl: 4     ibuprofen (ADVIL/MOTRIN) 200 MG tablet, Take 200 mg by mouth, Disp: , Rfl:      levothyroxine (SYNTHROID/LEVOTHROID) 137 MCG tablet, Take 1 tablet (137 mcg) by mouth daily, Disp: 30 tablet, Rfl: 11     metoprolol succinate ER (TOPROL-XL) 50 MG 24 hr tablet, Take 1 tablet (50 mg) by mouth daily, Disp: 90 tablet, Rfl: 3     naloxone (NARCAN) 4 MG/0.1ML nasal spray, Spray 1 spray (4 mg) into one nostril alternating nostrils as needed for opioid reversal every 2-3 minutes until assistance  arrives (Patient not taking: Reported on 9/30/2021), Disp: 0.2 mL, Rfl:      nitroGLYcerin (NITROSTAT) 0.4 MG sublingual tablet, For chest pain place 1 tablet under the tongue every 5 minutes for 3 doses. If symptoms persist 5 minutes after 1st dose call 911. (Patient not taking: Reported on 9/30/2021), Disp: 25 tablet, Rfl: 0     nystatin (MYCOSTATIN) 494442 UNIT/ML suspension, Take 5 mLs (500,000 Units) by mouth 4 times daily (Patient not taking: Reported on 9/30/2021), Disp: 240 mL, Rfl: 0     omeprazole (PRILOSEC) 40 MG DR capsule, Take 1 capsule (40 mg) by mouth daily (Patient not taking: Reported on 9/30/2021), Disp: 90 capsule, Rfl: 3     pilocarpine (SALAGEN) 5 MG tablet, Take 1 tablet (5 mg) by mouth 3 times daily, Disp: 90 tablet, Rfl: 3     prochlorperazine (COMPAZINE) 5 MG tablet, TAKE 1 TABLET BY MOUTH EVERY 6 HOURS AS NEEDED FOR NAUSEA OR VOMITING, Disp: , Rfl:      rosuvastatin (CRESTOR) 40 MG tablet, Take 1 tablet (40 mg) by mouth daily, Disp: 90 tablet, Rfl: 0     testosterone (ANDROGEL 1.62 % PUMP) 20.25 MG/ACT gel, testosterone 20.25 mg/1.25 gram (1.62 %) transdermal gel pump, Disp: , Rfl:      zolpidem ER (AMBIEN CR) 12.5 MG CR tablet, Take 1 tablet (12.5 mg) by mouth nightly as needed for sleep, Disp: 30 tablet, Rfl: 3    Allergies -   Allergies   Allergen Reactions     Animal Dander      Azithromycin Nausea and Vomiting     Dust Mites      Pollen Extract      Smoke.        Social History -   Social History     Socioeconomic History     Marital status:      Spouse name: Alessandra     Number of children: 2     Years of education: Not on file     Highest education level: Not on file   Occupational History     Occupation: Retired   Tobacco Use     Smoking status: Never Smoker     Smokeless tobacco: Never Used   Substance and Sexual Activity     Alcohol use: Yes     Alcohol/week: 8.3 standard drinks     Types: 10 Cans of beer per week     Comment: occasional     Drug use: No     Sexual activity:  Yes     Partners: Female     Birth control/protection: Female Surgical   Other Topics Concern      Service Not Asked     Blood Transfusions Not Asked     Caffeine Concern Not Asked     Occupational Exposure Not Asked     Hobby Hazards Not Asked     Sleep Concern Not Asked     Stress Concern Not Asked     Weight Concern Not Asked     Special Diet Not Asked     Back Care Not Asked     Exercise Not Asked     Bike Helmet Not Asked     Seat Belt Not Asked     Self-Exams Not Asked     Parent/sibling w/ CABG, MI or angioplasty before 65F 55M? No   Social History Narrative     Not on file     Social Determinants of Health     Financial Resource Strain:      Difficulty of Paying Living Expenses:    Food Insecurity:      Worried About Running Out of Food in the Last Year:      Ran Out of Food in the Last Year:    Transportation Needs:      Lack of Transportation (Medical):      Lack of Transportation (Non-Medical):    Physical Activity:      Days of Exercise per Week:      Minutes of Exercise per Session:    Stress:      Feeling of Stress :    Social Connections:      Frequency of Communication with Friends and Family:      Frequency of Social Gatherings with Friends and Family:      Attends Voodoo Services:      Active Member of Clubs or Organizations:      Attends Club or Organization Meetings:      Marital Status:    Intimate Partner Violence:      Fear of Current or Ex-Partner:      Emotionally Abused:      Physically Abused:      Sexually Abused:        Family History -   Family History   Problem Relation Age of Onset     Hypertension Father         Lived to age 87     Connective Tissue Disorder Mother         LUPUS     Heart Disease Mother         Valve replacement     Anxiety Disorder Mother         Lived to age 84     Dementia Mother         Nursing Home (lived to age 86)       Review of Systems - As per HPI and PMHx, otherwise review of system review of the head and neck negative. Otherwise 10+ review of  system is negative    Physical Exam  There were no vitals taken for this visit.  BMI: There is no height or weight on file to calculate BMI.    General - The patient is well nourished and well developed, and appears to have good nutritional status.  Alert and oriented to person and place, answers questions and cooperates with examination appropriately.    SKIN - No suspicious lesions or rashes.  Respiration - No respiratory distress.  Head and Face - Normocephalic and atraumatic, with no gross asymmetry noted of the contour of the facial features.  The facial nerve is intact, with strong symmetric movements.    Voice and Breathing - The patient was breathing comfortably without the use of accessory muscles. The patients voice was clear and strong, and had appropriate pitch and quality.    Ears - Bilateral pinna and EACs with normal appearing overlying skin.  Right tympanic membrane intact with good mobility on pneumatic otoscopy y. Bony landmarks of the ossicular chain are normal. The tympanic membranes are normal in appearance. No retraction, perforation, or masses.  No fluid or purulence was seen in the external canal or the middle ear.  On the left side tympanic membrane somewhat retracted large amount of serous fluid seen behind it.  Ear canal appears to be clear and dry.    Eyes - Extraocular movements intact.  Sclera were not icteric or injected, conjunctiva were pink and moist.    Mouth - Examination of the oral cavity showed pale, somewhat dry  oral mucosa. No lesions or ulcerations noted.  The tongue was mobile and midline, and the dentition were in good condition.      Throat - The walls of the oropharynx were smooth, pink, but dry , symmetric, and had no lesions or ulcerations.  The tonsillar pillars and soft palate were symmetric. Tonsils are 1+. The uvula was midline on elevation.    Neck - Normal midline excursion of the laryngotracheal complex during swallowing.  Full range of motion on passive  movement.  Palpation of the occipital, submental, submandibular, internal jugular chain, and supraclavicular nodes did not demonstrate any abnormal lymph nodes or masses.  The carotid pulse was palpable bilaterally.  Palpation of the thyroid was soft and smooth, with no nodules or goiter appreciated.  The trachea was mobile and midline.    Nose - External contour is symmetric, no gross deflection or scars.  Nasal mucosa is pink and moist with no abnormal mucus.  The septum was midline and non-obstructive, turbinates of normal size and position.  No polyps, masses, or purulence noted on examination.    Neuro - Nonfocal neuro exam is normal, CN 2 through 12 intact, normal gait and muscle tone.      Performed in clinic today: This point discussed further therapy of his ear.  Considering how uncomfortable he has we discussed attentional myringotomy during the centesis and then the if symptoms recur when control perforation closes potential placement with tube.  He understands risks and benefits of myringotomy and wished to have it done.  Patient is topically anesthetized under the guidance of magnified speculum in the posterior inferior quadrant.  Myringotomy blade is used to make a small radial incision.  Large amount of serous fluid is suctioned.  Patient tolerated procedure well and feels immediately better with most pressure gone and hearing improved.        A/P - Quan Murpyh is a 71 year old male Patient presents with:  RECHECK: mucus, sore throat    Patient status post radiation therapy 4 months ago has chronic serous otitis media involving left ear.  At this point we performed successful myringotomy and myringotomy centesis.  He feels much better now.  Would like to reevaluate in 3 weeks.  Quan should follow up in 3 weeks at which point ventilation to be considered.    At Quan next appointment they will need a hearing test.      Too Christensen MD            Again, thank you for allowing me to participate  in the care of your patient.        Sincerely,        Too Christensen MD, MD

## 2021-11-07 DIAGNOSIS — F41.9 ANXIETY: ICD-10-CM

## 2021-11-08 RX ORDER — ALPRAZOLAM 0.5 MG
TABLET ORAL
Qty: 60 TABLET | Refills: 0 | Status: SHIPPED | OUTPATIENT
Start: 2021-11-08 | End: 2021-11-29

## 2021-11-08 NOTE — TELEPHONE ENCOUNTER
Xanax      Last Written Prescription Date:  10/13/2021  Last Fill Quantity: 60,   # refills: 0  Last Office Visit: 9/30/2021  Future Office visit:    Next 5 appointments (look out 90 days)      Nov 09, 2021 11:30 AM  Return Visit with Aramis Ch MD  [facility] ([facility] ) [address] [facility] [address]  [phone]     Nov 24, 2021  4:15 PM  Return Visit with Too Christensen MD  [facility] ([facility] ) [address] [address]  [phone]     Feb 02, 2022  1:00 PM  Return Visit with David Rogers MD  [facility] ([facility] ) [address] [facility] [address]  [phone]             Routing refill request to provider for review/approval because:  Drug not on the [facility], [facility] or [facility] refill protocol or controlled substance

## 2021-11-09 ENCOUNTER — OFFICE VISIT (OUTPATIENT)
Dept: CARDIOLOGY | Facility: CLINIC | Age: 71
End: 2021-11-09
Payer: MEDICARE

## 2021-11-09 VITALS
HEART RATE: 74 BPM | DIASTOLIC BLOOD PRESSURE: 64 MMHG | OXYGEN SATURATION: 98 % | HEIGHT: 68 IN | BODY MASS INDEX: 31.24 KG/M2 | SYSTOLIC BLOOD PRESSURE: 106 MMHG | WEIGHT: 206.1 LBS

## 2021-11-09 DIAGNOSIS — I25.750 CORONARY ARTERY DISEASE INVOLVING NATIVE ARTERY OF TRANSPLANTED HEART WITH UNSTABLE ANGINA PECTORIS (H): ICD-10-CM

## 2021-11-09 DIAGNOSIS — E78.5 HYPERLIPIDEMIA LDL GOAL <130: ICD-10-CM

## 2021-11-09 DIAGNOSIS — I10 BENIGN ESSENTIAL HYPERTENSION: Primary | ICD-10-CM

## 2021-11-09 LAB
ALT SERPL W P-5'-P-CCNC: 74 U/L (ref 0–70)
ANION GAP SERPL CALCULATED.3IONS-SCNC: 4 MMOL/L (ref 3–14)
BUN SERPL-MCNC: 24 MG/DL (ref 7–30)
CALCIUM SERPL-MCNC: 8.9 MG/DL (ref 8.5–10.1)
CHLORIDE BLD-SCNC: 105 MMOL/L (ref 94–109)
CHOLEST SERPL-MCNC: 135 MG/DL
CO2 SERPL-SCNC: 30 MMOL/L (ref 20–32)
CREAT SERPL-MCNC: 1.11 MG/DL (ref 0.66–1.25)
FASTING STATUS PATIENT QL REPORTED: YES
GFR SERPL CREATININE-BSD FRML MDRD: 67 ML/MIN/1.73M2
GLUCOSE BLD-MCNC: 100 MG/DL (ref 70–99)
HDLC SERPL-MCNC: 48 MG/DL
LDLC SERPL CALC-MCNC: 34 MG/DL
NONHDLC SERPL-MCNC: 87 MG/DL
POTASSIUM BLD-SCNC: 4.1 MMOL/L (ref 3.4–5.3)
SODIUM SERPL-SCNC: 139 MMOL/L (ref 133–144)
TRIGL SERPL-MCNC: 263 MG/DL

## 2021-11-09 PROCEDURE — 36415 COLL VENOUS BLD VENIPUNCTURE: CPT | Performed by: INTERNAL MEDICINE

## 2021-11-09 PROCEDURE — 80048 BASIC METABOLIC PNL TOTAL CA: CPT | Performed by: INTERNAL MEDICINE

## 2021-11-09 PROCEDURE — 99214 OFFICE O/P EST MOD 30 MIN: CPT | Performed by: INTERNAL MEDICINE

## 2021-11-09 PROCEDURE — 80061 LIPID PANEL: CPT | Performed by: INTERNAL MEDICINE

## 2021-11-09 PROCEDURE — 84460 ALANINE AMINO (ALT) (SGPT): CPT | Performed by: INTERNAL MEDICINE

## 2021-11-09 ASSESSMENT — MIFFLIN-ST. JEOR: SCORE: 1669.36

## 2021-11-09 NOTE — LETTER
11/9/2021    Ramakrishna King, DO  [address] [place] [address]    RE: Quan Murphy       Dear Colleague,    I had the pleasure of seeing Quan Murphy in the [facility].    HPI and Plan:   See dictation  Today's clinic visit entailed:  Review of the result(s) of each unique test - BMP, ECG, FLP, ALT  Ordering of each unique test  Prescription drug management  30 minutes spent on the date of the encounter doing chart review, review of test results, patient visit and documentation   Provider  Link to [facility] Help Grid     The level of medical decision making during this visit was of moderate complexity.    Orders Placed This Encounter   Procedures     Basic metabolic panel     Lipid Profile     ALT     Lipid Profile     ALT     Follow-Up with Cardiologist     Echocardiogram Complete       No orders of the defined types were placed in this encounter.      There are no discontinued medications.      Encounter Diagnoses   Name Primary?     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)        CURRENT MEDICATIONS:  Current Outpatient Medications   Medication Sig Dispense Refill     ALPRAZolam (XANAX) 0.5 MG tablet TAKE ONE TABLET BY MOUTH THREE TIMES A DAY AS NEEDED FOR ANXIETY 60 tablet 0     cetirizine (ZYRTEC) 10 MG tablet Take 10 mg by mouth       cevimeline (EVOXAC) 30 MG capsule Take 1 capsule (30 mg) by mouth 3 times daily 120 capsule 11     citalopram (CELEXA) 40 MG tablet TAKE ONE TABLET BY MOUTH ONCE DAILY 90 tablet 0     dronabinol (MARINOL) 2.5 MG capsule Take 1 capsule (2.5 mg) by mouth 2 times daily (before meals) 28 capsule 3     HYDROcodone-acetaminophen (NORCO)  MG per tablet Take 1-2 tablets by mouth every 4 hours as needed for severe pain . Take maximum of 6 tabs a day. 180 tablet 0      hydrOXYzine (VISTARIL) 25 MG capsule Take 1 capsule (25 mg) by mouth 4 times daily as needed for anxiety 120 capsule 4     ibuprofen (ADVIL/MOTRIN) 200 MG tablet Take 200 mg by mouth       levothyroxine (SYNTHROID/LEVOTHROID) 137 MCG tablet Take 1 tablet (137 mcg) by mouth daily 30 tablet 11     metoprolol succinate ER (TOPROL-XL) 50 MG 24 hr tablet Take 1 tablet (50 mg) by mouth daily 90 tablet 0     pilocarpine (SALAGEN) 5 MG tablet Take 1 tablet (5 mg) by mouth 3 times daily 90 tablet 3     prochlorperazine (COMPAZINE) 5 MG tablet TAKE 1 TABLET BY MOUTH EVERY 6 HOURS AS NEEDED FOR NAUSEA OR VOMITING       rosuvastatin (CRESTOR) 40 MG tablet Take 1 tablet (40 mg) by mouth daily 90 tablet 0     testosterone (ANDROGEL 1.62 % PUMP) 20.25 MG/ACT gel testosterone 20.25 mg/1.25 gram (1.62 %) transdermal gel pump       zolpidem ER (AMBIEN CR) 12.5 MG CR tablet Take 1 tablet (12.5 mg) by mouth nightly as needed for sleep 30 tablet 3     amoxicillin-clavulanate (AUGMENTIN-ES) 600-42.9 MG/5ML suspension Take 5 mLs (600 mg) by mouth 2 times daily (Patient not taking: Reported on 9/17/2021) 200 mL 0     ASPIRIN ADULT LOW STRENGTH 81 MG EC tablet TAKE ONE TABLET BY MOUTH ONCE DAILY (Patient not taking: Reported on 9/17/2021) 90 tablet 1     fluconazole (DIFLUCAN) 200 MG tablet Take 1 tablet (200 mg) by mouth daily (Patient not taking: Reported on 11/3/2021) 7 tablet 0     naloxone (NARCAN) 4 MG/0.1ML nasal spray Spray 1 spray (4 mg) into one nostril alternating nostrils as needed for opioid reversal every 2-3 minutes until assistance arrives (Patient not taking: Reported on 9/30/2021) 0.2 mL      nitroGLYcerin (NITROSTAT) 0.4 MG sublingual tablet For chest pain place 1 tablet under the tongue every 5 minutes for 3 doses. If symptoms persist 5 minutes after 1st dose call 911. (Patient not taking: Reported on 9/30/2021) 25 tablet 0     nystatin (MYCOSTATIN) 928434 UNIT/ML suspension Take 5 mLs (500,000 Units) by mouth 4 times  daily (Patient not taking: Reported on 9/30/2021) 240 mL 0     omeprazole (PRILOSEC) 40 MG DR capsule Take 1 capsule (40 mg) by mouth daily (Patient not taking: Reported on 9/30/2021) 90 capsule 3       ALLERGIES     Allergies   Allergen Reactions     Animal Dander      Azithromycin Nausea and Vomiting     Dust Mites      Pollen Extract      Smoke.        PAST MEDICAL HISTORY:  Past Medical History:   Diagnosis Date     Allergic rhinitis      Allergy, unspecified not elsewhere classified     Seasonal allergies, pollen, dust, smoke and animals     Antiplatelet or antithrombotic long-term use      Anxiety      Arthritis      Chest pain      Chronic sinusitis      Coronary atherosclerosis of unspecified type of vessel, native or graft     Coronary artery disease     Depressive disorder 1995     Gastroesophageal reflux disease 2020    Cleared with medication     Head injury 1954     Hiatal hernia 2015    Right Side     History of blood transfusion 12/15/2004    Prostate Surgery - My own blood     Hyperlipidemia      Hypertension      Inguinal hernia      Kidney problem 10/08/2017    Lithotripsy     Kidney stones      Malignant neoplasm of prostate (H)     Prostate cancer     Prostate cancer (H)        PAST SURGICAL HISTORY:  Past Surgical History:   Procedure Laterality Date     ARTHRODESIS FOOT  7/23/2013    Procedure: ARTHRODESIS FOOT;  Great Toe Arthrodesis Left Foot;  Surgeon: Ash Gonzalez DPM;  Location: [facility] OR     ARTHRODESIS FOOT  6/10/2014    Procedure: ARTHRODESIS FOOT;  Surgeon: Ash Gonzalez DPM;  Location: [facility] OR     BIOPSY  10/1/2004    dermatologist biopsies     C LAPAROSCOPY, SURGICAL PROSTATECTOMY, RETROPUBIC RADICAL, W/NERVE SPARING  11/30/2004    With full bilateral pelvic lymphadenectomy.  [facility].     C TOTAL KNEE ARTHROPLASTY  05/01/08    Left knee     COLONOSCOPY  10/7/2013    Procedure: COLONOSCOPY;  Colonoscopy;  Surgeon: Mike Fallon MD;  Location:  GI     ESOPHAGOSCOPY,  GASTROSCOPY, DUODENOSCOPY (EGD), COMBINED N/A 2/12/2020    Procedure: ESOPHAGOGASTRODUODENOSCOPY (EGD);  Surgeon: Sam Escobar MD;  Location: [facility] GI     EXTRACORPOREAL SHOCK WAVE LITHOTRIPSY (ESWL) Bilateral 10/18/2017    Procedure: EXTRACORPOREAL SHOCK WAVE LITHOTRIPSY (ESWL);  BILATERAL EXTRACORPOREAL SHOCKWAVE LITHOTRIPSY ;  Surgeon: Meir Torres MD;  Location: [facility] OR     HC CORRECT BUNION,SIMPLE  08/11/2005    x3     HC REMV TOE BENIGN BONE LESN  08/11/2005     HEAD & NECK SURGERY  1954    From a fall     HERNIORRHAPHY INGUINAL  7/3/2013    Procedure: HERNIORRHAPHY INGUINAL;  Open Repair Inguinal hernia Right with mesh ;  Surgeon: Sam Escobar MD;  Location: [facility] OR     IR GASTROSTOMY TUBE PERCUTANEOUS PLCMNT  6/7/2021     MOHS MICROGRAPHIC PROCEDURE  08/23/11    ear and chin-[facility]     OPEN REDUCTION INTERNAL FIXATION WRIST Right 7/18/2017    Procedure: OPEN REDUCTION INTERNAL FIXATION WRIST;  Right distal radius open reduction and internal fixation;  Surgeon: Pedro Blanca DO;  Location: [facility] OR     RECONSTRUCT FOREFOOT WITH METATARSOPHALANGEAL (MTP) FUSION  6/10/2014    Procedure: RECONSTRUCT FOREFOOT WITH METATARSOPHALANGEAL (MTP) FUSION;  Surgeon: Ash Gonzalez DPM;  Location: [facility] OR     STENT, CORONARY, DEMI       SURGICAL HISTORY OF -   1999/1974    lt knee     SURGICAL HISTORY OF -   10/2004    lithotripsy     SURGICAL HISTORY OF -   11/05    angiogram with stent     VASCULAR SURGERY  11/17/2005    Puncture of iliac artery during and angiogram       FAMILY HISTORY:  Family History   Problem Relation Age of Onset     Hypertension Father         Lived to age 87     Connective Tissue Disorder Mother         LUPUS     Heart Disease Mother         Valve replacement     Anxiety Disorder Mother         Lived to age 84     Dementia Mother         Nursing Home (lived to age 86)       SOCIAL HISTORY:  Social History     Socioeconomic History     Marital status:       Spouse name: Alessandra     Number of children: 2     Years of education: Not on file     Highest education level: Not on file   Occupational History     Occupation: Retired   Tobacco Use     Smoking status: Never Smoker     Smokeless tobacco: Never Used   Substance and Sexual Activity     Alcohol use: Yes     Alcohol/week: 8.3 standard drinks     Types: 10 Cans of beer per week     Comment: occasional     Drug use: No     Sexual activity: Yes     Partners: Female     Birth control/protection: Female Surgical   Other Topics Concern      Service Not Asked     Blood Transfusions Not Asked     Caffeine Concern Not Asked     Occupational Exposure Not Asked     Hobby Hazards Not Asked     Sleep Concern Not Asked     Stress Concern Not Asked     Weight Concern Not Asked     Special Diet Not Asked     Back Care Not Asked     Exercise Not Asked     Bike Helmet Not Asked     Seat Belt Not Asked     Self-Exams Not Asked     Parent/sibling w/ CABG, MI or angioplasty before 65F 55M? No   Social History Narrative     Not on file     Social Determinants of Health     Financial Resource Strain: Not on file   Food Insecurity: Not on file   Transportation Needs: Not on file   Physical Activity: Not on file   Stress: Not on file   Social Connections: Not on file   Intimate Partner Violence: Not on file   Housing Stability: Not on file       Review of Systems:  Skin:  Negative       Eyes:  Negative      ENT:  Negative      Respiratory:  Negative       Cardiovascular:  Negative for;palpitations;chest pain;edema;lightheadedness;dizziness Positive for;fatigue    Gastroenterology: Negative      Genitourinary:  Negative      Musculoskeletal:  Negative      Neurologic:  Negative      Psychiatric:  Negative      Heme/Lymph/Imm:  Positive for allergies    Endocrine:  Positive for thyroid disorder      Physical Exam:  Vitals: /64 (BP Location: Left arm, Patient Position: Sitting, Cuff Size: Adult Regular)   Pulse 74   Ht 1.727 m  "(5' 8\")   Wt 93.5 kg (206 lb 1.6 oz)   SpO2 98%   BMI 31.34 kg/m      Constitutional:           Skin:             Head:           Eyes:           Lymph:      ENT:           Neck:           Respiratory:            Cardiac:                                                           GI:           Extremities and Muscular Skeletal:                 Neurological:           Psych:           CC  No referring provider defined for this encounter.                  Thank you for allowing me to participate in the care of your patient.      Sincerely,     AIDEN ALEXANDRE MD     [facility]  cc:   No referring provider defined for this encounter.        "

## 2021-11-09 NOTE — PROGRESS NOTES
Service Date: 2021    Ramakrishna King MD  [facility]  [address] [place]  [address]    RE:  Quan Murphy  MRN:  [id number]  :  1950    Dear Dr. King:    I had the opportunity to see Mr. Quan Murphy in Cardiology Clinic today at [facility] in [place] for reevaluation of coronary artery disease and risk factors including hypertension and dyslipidemia.  He is a 71-year-old gentleman who had left shoulder and left arm discomfort in 2016 and was transferred down to [facility] for evaluation.  He was found to have severe proximal LAD stenosis treated with angioplasty and stenting.  His left ventricular function has remained normal.    Since then, he has been stable from a cardiac standpoint.  He was initially on lisinopril and metoprolol, but metoprolol was discontinued due to low blood pressures.  His lisinopril was increased when his blood pressures increased and he developed some mild kidney dysfunction.  Since then, lisinopril has been discontinued altogether and he has gone back to metoprolol for blood pressure control.    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  This has included chemotherapy and radiation.  This has been extremely uncomfortable.  He still has discomfort and problems with swallowing despite completing his treatment in .  He has been able to maintain his weight and no longer has to have a G tube in place.  He has not been very active recently because of symptoms of persistent fatigue, but notices no problems with shortness of breath, lightheadedness or syncope.  He has no chest discomfort.    Recent laboratory investigations looked good.  His basic metabolic panel is normal and his cholesterol numbers are excellent, probably partly due to reduced oral intake.  His LDL  is 34 and HDL is 48.    PHYSICAL EXAMINATION:  His blood pressure is 106/64, heart rate 74 and weight 206 pounds.  His lungs are clear.  Heart rhythm is regular.  He has no cardiac murmurs or carotid bruits.    IMPRESSIONS:  Mr. Quan Murphy is a 71-year-old gentleman with a history of unstable angina treated with stenting of his proximal LAD at the end of 2016.  His hypertension and dyslipidemia are well controlled and his cardiac status appears stable.  Unfortunately, he has been undergoing treatment for squamous cell carcinoma of the tonsil and soft palate with chemotherapy and radiation and has had a very difficult time with that.  I am hopeful that he will continue to recover from that difficult treatment, but fortunately, his cardiac issues appear stable.  He is not having any concerning symptoms.  I will not make any medication changes.  I will plan to see him back again in 1 year for reevaluation.    Sincerely,    Aramis Ch MD, [facility]        D: 2021   T: 2021   MT: anita    Name:     QUAN MURPHY  MRN:      [id number]        Account:      [id number]   :      1950           Service Date: 2021       Document: [id number]

## 2021-11-09 NOTE — LETTER
11/9/2021       RE: Quan Murphy  [address]  [facility] [address]     Dear Colleague,    Thank you for referring your patient, Quan Murphy, to the [facility] [place] at [facility]. Please see a copy of my visit note below.    HPI and Plan:   See dictation  Today's clinic visit entailed:  Review of the result(s) of each unique test - BMP, ECG, FLP, ALT  Ordering of each unique test  Prescription drug management  30 minutes spent on the date of the encounter doing chart review, review of test results, patient visit and documentation   Provider  Link to [facility] Help Grid     The level of medical decision making during this visit was of moderate complexity.    Orders Placed This Encounter   Procedures     Basic metabolic panel     Lipid Profile     ALT     Lipid Profile     ALT     Follow-Up with Cardiologist     Echocardiogram Complete       No orders of the defined types were placed in this encounter.      There are no discontinued medications.      Encounter Diagnoses   Name Primary?     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)        CURRENT MEDICATIONS:  Current Outpatient Medications   Medication Sig Dispense Refill     ALPRAZolam (XANAX) 0.5 MG tablet TAKE ONE TABLET BY MOUTH THREE TIMES A DAY AS NEEDED FOR ANXIETY 60 tablet 0     cetirizine (ZYRTEC) 10 MG tablet Take 10 mg by mouth       cevimeline (EVOXAC) 30 MG capsule Take 1 capsule (30 mg) by mouth 3 times daily 120 capsule 11     citalopram (CELEXA) 40 MG tablet TAKE ONE TABLET BY MOUTH ONCE DAILY 90 tablet 0     dronabinol (MARINOL) 2.5 MG capsule Take 1 capsule (2.5 mg) by mouth 2 times daily (before meals) 28 capsule 3     HYDROcodone-acetaminophen (NORCO)  MG per tablet Take 1-2 tablets by mouth every 4 hours as needed for severe pain . Take  maximum of 6 tabs a day. 180 tablet 0     hydrOXYzine (VISTARIL) 25 MG capsule Take 1 capsule (25 mg) by mouth 4 times daily as needed for anxiety 120 capsule 4     ibuprofen (ADVIL/MOTRIN) 200 MG tablet Take 200 mg by mouth       levothyroxine (SYNTHROID/LEVOTHROID) 137 MCG tablet Take 1 tablet (137 mcg) by mouth daily 30 tablet 11     metoprolol succinate ER (TOPROL-XL) 50 MG 24 hr tablet Take 1 tablet (50 mg) by mouth daily 90 tablet 0     pilocarpine (SALAGEN) 5 MG tablet Take 1 tablet (5 mg) by mouth 3 times daily 90 tablet 3     prochlorperazine (COMPAZINE) 5 MG tablet TAKE 1 TABLET BY MOUTH EVERY 6 HOURS AS NEEDED FOR NAUSEA OR VOMITING       rosuvastatin (CRESTOR) 40 MG tablet Take 1 tablet (40 mg) by mouth daily 90 tablet 0     testosterone (ANDROGEL 1.62 % PUMP) 20.25 MG/ACT gel testosterone 20.25 mg/1.25 gram (1.62 %) transdermal gel pump       zolpidem ER (AMBIEN CR) 12.5 MG CR tablet Take 1 tablet (12.5 mg) by mouth nightly as needed for sleep 30 tablet 3     amoxicillin-clavulanate (AUGMENTIN-ES) 600-42.9 MG/5ML suspension Take 5 mLs (600 mg) by mouth 2 times daily (Patient not taking: Reported on 9/17/2021) 200 mL 0     ASPIRIN ADULT LOW STRENGTH 81 MG EC tablet TAKE ONE TABLET BY MOUTH ONCE DAILY (Patient not taking: Reported on 9/17/2021) 90 tablet 1     fluconazole (DIFLUCAN) 200 MG tablet Take 1 tablet (200 mg) by mouth daily (Patient not taking: Reported on 11/3/2021) 7 tablet 0     naloxone (NARCAN) 4 MG/0.1ML nasal spray Spray 1 spray (4 mg) into one nostril alternating nostrils as needed for opioid reversal every 2-3 minutes until assistance arrives (Patient not taking: Reported on 9/30/2021) 0.2 mL      nitroGLYcerin (NITROSTAT) 0.4 MG sublingual tablet For chest pain place 1 tablet under the tongue every 5 minutes for 3 doses. If symptoms persist 5 minutes after 1st dose call 911. (Patient not taking: Reported on 9/30/2021) 25 tablet 0     nystatin (MYCOSTATIN) 076283 UNIT/ML suspension Take  5 mLs (500,000 Units) by mouth 4 times daily (Patient not taking: Reported on 9/30/2021) 240 mL 0     omeprazole (PRILOSEC) 40 MG DR capsule Take 1 capsule (40 mg) by mouth daily (Patient not taking: Reported on 9/30/2021) 90 capsule 3       ALLERGIES     Allergies   Allergen Reactions     Animal Dander      Azithromycin Nausea and Vomiting     Dust Mites      Pollen Extract      Smoke.        PAST MEDICAL HISTORY:  Past Medical History:   Diagnosis Date     Allergic rhinitis      Allergy, unspecified not elsewhere classified     Seasonal allergies, pollen, dust, smoke and animals     Antiplatelet or antithrombotic long-term use      Anxiety      Arthritis      Chest pain      Chronic sinusitis      Coronary atherosclerosis of unspecified type of vessel, native or graft     Coronary artery disease     Depressive disorder 1995     Gastroesophageal reflux disease 2020    Cleared with medication     Head injury 1954     Hiatal hernia 2015    Right Side     History of blood transfusion 12/15/2004    Prostate Surgery - My own blood     Hyperlipidemia      Hypertension      Inguinal hernia      Kidney problem 10/08/2017    Lithotripsy     Kidney stones      Malignant neoplasm of prostate (H)     Prostate cancer     Prostate cancer (H)        PAST SURGICAL HISTORY:  Past Surgical History:   Procedure Laterality Date     ARTHRODESIS FOOT  7/23/2013    Procedure: ARTHRODESIS FOOT;  Great Toe Arthrodesis Left Foot;  Surgeon: Ash Gonzalez DPM;  Location: [facility] OR     ARTHRODESIS FOOT  6/10/2014    Procedure: ARTHRODESIS FOOT;  Surgeon: Ash Gonzalez DPM;  Location: [facility] OR     BIOPSY  10/1/2004    dermatologist biopsies     C LAPAROSCOPY, SURGICAL PROSTATECTOMY, RETROPUBIC RADICAL, W/NERVE SPARING  11/30/2004    With full bilateral pelvic lymphadenectomy.  F-[facility].     C TOTAL KNEE ARTHROPLASTY  05/01/08    Left knee     COLONOSCOPY  10/7/2013    Procedure: COLONOSCOPY;  Colonoscopy;  Surgeon: Mike Fallon MD;   Location: [facility]     ESOPHAGOSCOPY, GASTROSCOPY, DUODENOSCOPY (EGD), COMBINED N/A 2/12/2020    Procedure: ESOPHAGOGASTRODUODENOSCOPY (EGD);  Surgeon: Sam Escobar MD;  Location: [facility]     EXTRACORPOREAL SHOCK WAVE LITHOTRIPSY (ESWL) Bilateral 10/18/2017    Procedure: EXTRACORPOREAL SHOCK WAVE LITHOTRIPSY (ESWL);  BILATERAL EXTRACORPOREAL SHOCKWAVE LITHOTRIPSY ;  Surgeon: Meir Torres MD;  Location: [facility] OR     HC CORRECT BUNION,SIMPLE  08/11/2005    x3     HC REMV TOE BENIGN BONE LESN  08/11/2005     HEAD & NECK SURGERY  1954    From a fall     HERNIORRHAPHY INGUINAL  7/3/2013    Procedure: HERNIORRHAPHY INGUINAL;  Open Repair Inguinal hernia Right with mesh ;  Surgeon: Sam Escobar MD;  Location: [facility] OR     IR GASTROSTOMY TUBE PERCUTANEOUS PLCMNT  6/7/2021     MOHS MICROGRAPHIC PROCEDURE  08/23/11    ear and chin-[facility]     OPEN REDUCTION INTERNAL FIXATION WRIST Right 7/18/2017    Procedure: OPEN REDUCTION INTERNAL FIXATION WRIST;  Right distal radius open reduction and internal fixation;  Surgeon: Pedro Blanca DO;  Location: [facility]     RECONSTRUCT FOREFOOT WITH METATARSOPHALANGEAL (MTP) FUSION  6/10/2014    Procedure: RECONSTRUCT FOREFOOT WITH METATARSOPHALANGEAL (MTP) FUSION;  Surgeon: Ash Gonzalez DPM;  Location: [facility] OR     STENT, CORONARY, DEMI       SURGICAL HISTORY OF -   1999/1974    lt knee     SURGICAL HISTORY OF -   10/2004    lithotripsy     SURGICAL HISTORY OF -   11/05    angiogram with stent     VASCULAR SURGERY  11/17/2005    Puncture of iliac artery during and angiogram       FAMILY HISTORY:  Family History   Problem Relation Age of Onset     Hypertension Father         Lived to age 87     Connective Tissue Disorder Mother         LUPUS     Heart Disease Mother         Valve replacement     Anxiety Disorder Mother         Lived to age 84     Dementia Mother         Nursing Home (lived to age 86)       SOCIAL HISTORY:  Social History     Socioeconomic  History     Marital status:      Spouse name: Alessandra     Number of children: 2     Years of education: Not on file     Highest education level: Not on file   Occupational History     Occupation: Retired   Tobacco Use     Smoking status: Never Smoker     Smokeless tobacco: Never Used   Substance and Sexual Activity     Alcohol use: Yes     Alcohol/week: 8.3 standard drinks     Types: 10 Cans of beer per week     Comment: occasional     Drug use: No     Sexual activity: Yes     Partners: Female     Birth control/protection: Female Surgical   Other Topics Concern      Service Not Asked     Blood Transfusions Not Asked     Caffeine Concern Not Asked     Occupational Exposure Not Asked     Hobby Hazards Not Asked     Sleep Concern Not Asked     Stress Concern Not Asked     Weight Concern Not Asked     Special Diet Not Asked     Back Care Not Asked     Exercise Not Asked     Bike Helmet Not Asked     Seat Belt Not Asked     Self-Exams Not Asked     Parent/sibling w/ CABG, MI or angioplasty before 65F 55M? No   Social History Narrative     Not on file     Social Determinants of Health     Financial Resource Strain: Not on file   Food Insecurity: Not on file   Transportation Needs: Not on file   Physical Activity: Not on file   Stress: Not on file   Social Connections: Not on file   Intimate Partner Violence: Not on file   Housing Stability: Not on file       Review of Systems:  Skin:  Negative       Eyes:  Negative      ENT:  Negative      Respiratory:  Negative       Cardiovascular:  Negative for;palpitations;chest pain;edema;lightheadedness;dizziness Positive for;fatigue    Gastroenterology: Negative      Genitourinary:  Negative      Musculoskeletal:  Negative      Neurologic:  Negative      Psychiatric:  Negative      Heme/Lymph/Imm:  Positive for allergies    Endocrine:  Positive for thyroid disorder      Physical Exam:  Vitals: /64 (BP Location: Left arm, Patient Position: Sitting, Cuff Size: Adult  "Regular)   Pulse 74   Ht 1.727 m (5' 8\")   Wt 93.5 kg (206 lb 1.6 oz)   SpO2 98%   BMI 31.34 kg/m      Constitutional:           Skin:             Head:           Eyes:           Lymph:      ENT:           Neck:           Respiratory:            Cardiac:                                                           GI:           Extremities and Muscular Skeletal:                 Neurological:           Psych:             Service Date: 2021    Ramakrishna King MD  [facility]  [address] [place]  [address]    RE:  Quan Murphy  MRN:  [id number]  :  1950    Dear Dr. King:    I had the opportunity to see Mr. Quan Murphy in Cardiology Clinic today at [facility] in [place] for reevaluation of coronary artery disease and risk factors including hypertension and dyslipidemia.  He is a 71-year-old gentleman who had left shoulder and left arm discomfort in 2016 and was transferred down to [facility] for evaluation.  He was found to have severe proximal LAD stenosis treated with angioplasty and stenting.  His left ventricular function has remained normal.    Since then, he has been stable from a cardiac standpoint.  He was initially on lisinopril and metoprolol, but metoprolol was discontinued due to low blood pressures.  His lisinopril was increased when his blood pressures increased and he developed some mild kidney dysfunction.  Since then, lisinopril has been discontinued altogether and he has gone back to metoprolol for blood pressure control.    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  This has included chemotherapy and radiation.  This has been extremely uncomfortable.  He still has discomfort and problems with swallowing despite completing his treatment in .  He has been able to maintain " his weight and no longer has to have a G tube in place.  He has not been very active recently because of symptoms of persistent fatigue, but notices no problems with shortness of breath, lightheadedness or syncope.  He has no chest discomfort.    Recent laboratory investigations looked good.  His basic metabolic panel is normal and his cholesterol numbers are excellent, probably partly due to reduced oral intake.  His LDL is 34 and HDL is 48.    PHYSICAL EXAMINATION:  His blood pressure is 106/64, heart rate 74 and weight 206 pounds.  His lungs are clear.  Heart rhythm is regular.  He has no cardiac murmurs or carotid bruits.    IMPRESSIONS:  Mr. Quan Murphy is a 71-year-old gentleman with a history of unstable angina treated with stenting of his proximal LAD at the end of 2016.  His hypertension and dyslipidemia are well controlled and his cardiac status appears stable.  Unfortunately, he has been undergoing treatment for squamous cell carcinoma of the tonsil and soft palate with chemotherapy and radiation and has had a very difficult time with that.  I am hopeful that he will continue to recover from that difficult treatment, but fortunately, his cardiac issues appear stable.  He is not having any concerning symptoms.  I will not make any medication changes.  I will plan to see him back again in 1 year for reevaluation.        Aramis Ch MD, [facility]        D: 2021   T: 2021   MT: anita    Name:     QUAN MURPHY  MRN:      [id number]        Account:      [id number]   :      1950           Service Date: 2021     Document: [id number]

## 2021-11-09 NOTE — PROGRESS NOTES
HPI and Plan:   See dictation  Today's clinic visit entailed:  Review of the result(s) of each unique test - BMP, ECG, FLP, ALT  Ordering of each unique test  Prescription drug management  30 minutes spent on the date of the encounter doing chart review, review of test results, patient visit and documentation   Provider  Link to [facility] Help Grid     The level of medical decision making during this visit was of moderate complexity.    Orders Placed This Encounter   Procedures     Basic metabolic panel     Lipid Profile     ALT     Lipid Profile     ALT     Follow-Up with Cardiologist     Echocardiogram Complete       No orders of the defined types were placed in this encounter.      There are no discontinued medications.      Encounter Diagnoses   Name Primary?     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)        CURRENT MEDICATIONS:  Current Outpatient Medications   Medication Sig Dispense Refill     ALPRAZolam (XANAX) 0.5 MG tablet TAKE ONE TABLET BY MOUTH THREE TIMES A DAY AS NEEDED FOR ANXIETY 60 tablet 0     cetirizine (ZYRTEC) 10 MG tablet Take 10 mg by mouth       cevimeline (EVOXAC) 30 MG capsule Take 1 capsule (30 mg) by mouth 3 times daily 120 capsule 11     citalopram (CELEXA) 40 MG tablet TAKE ONE TABLET BY MOUTH ONCE DAILY 90 tablet 0     dronabinol (MARINOL) 2.5 MG capsule Take 1 capsule (2.5 mg) by mouth 2 times daily (before meals) 28 capsule 3     HYDROcodone-acetaminophen (NORCO)  MG per tablet Take 1-2 tablets by mouth every 4 hours as needed for severe pain . Take maximum of 6 tabs a day. 180 tablet 0     hydrOXYzine (VISTARIL) 25 MG capsule Take 1 capsule (25 mg) by mouth 4 times daily as needed for anxiety 120 capsule 4     ibuprofen (ADVIL/MOTRIN) 200 MG tablet Take 200 mg by mouth       levothyroxine (SYNTHROID/LEVOTHROID) 137 MCG tablet Take 1 tablet (137  mcg) by mouth daily 30 tablet 11     metoprolol succinate ER (TOPROL-XL) 50 MG 24 hr tablet Take 1 tablet (50 mg) by mouth daily 90 tablet 0     pilocarpine (SALAGEN) 5 MG tablet Take 1 tablet (5 mg) by mouth 3 times daily 90 tablet 3     prochlorperazine (COMPAZINE) 5 MG tablet TAKE 1 TABLET BY MOUTH EVERY 6 HOURS AS NEEDED FOR NAUSEA OR VOMITING       rosuvastatin (CRESTOR) 40 MG tablet Take 1 tablet (40 mg) by mouth daily 90 tablet 0     testosterone (ANDROGEL 1.62 % PUMP) 20.25 MG/ACT gel testosterone 20.25 mg/1.25 gram (1.62 %) transdermal gel pump       zolpidem ER (AMBIEN CR) 12.5 MG CR tablet Take 1 tablet (12.5 mg) by mouth nightly as needed for sleep 30 tablet 3     amoxicillin-clavulanate (AUGMENTIN-ES) 600-42.9 MG/5ML suspension Take 5 mLs (600 mg) by mouth 2 times daily (Patient not taking: Reported on 9/17/2021) 200 mL 0     ASPIRIN ADULT LOW STRENGTH 81 MG EC tablet TAKE ONE TABLET BY MOUTH ONCE DAILY (Patient not taking: Reported on 9/17/2021) 90 tablet 1     fluconazole (DIFLUCAN) 200 MG tablet Take 1 tablet (200 mg) by mouth daily (Patient not taking: Reported on 11/3/2021) 7 tablet 0     naloxone (NARCAN) 4 MG/0.1ML nasal spray Spray 1 spray (4 mg) into one nostril alternating nostrils as needed for opioid reversal every 2-3 minutes until assistance arrives (Patient not taking: Reported on 9/30/2021) 0.2 mL      nitroGLYcerin (NITROSTAT) 0.4 MG sublingual tablet For chest pain place 1 tablet under the tongue every 5 minutes for 3 doses. If symptoms persist 5 minutes after 1st dose call 911. (Patient not taking: Reported on 9/30/2021) 25 tablet 0     nystatin (MYCOSTATIN) 301591 UNIT/ML suspension Take 5 mLs (500,000 Units) by mouth 4 times daily (Patient not taking: Reported on 9/30/2021) 240 mL 0     omeprazole (PRILOSEC) 40 MG DR capsule Take 1 capsule (40 mg) by mouth daily (Patient not taking: Reported on 9/30/2021) 90 capsule 3       ALLERGIES     Allergies   Allergen Reactions     Animal  Dander      Azithromycin Nausea and Vomiting     Dust Mites      Pollen Extract      Smoke.        PAST MEDICAL HISTORY:  Past Medical History:   Diagnosis Date     Allergic rhinitis      Allergy, unspecified not elsewhere classified     Seasonal allergies, pollen, dust, smoke and animals     Antiplatelet or antithrombotic long-term use      Anxiety      Arthritis      Chest pain      Chronic sinusitis      Coronary atherosclerosis of unspecified type of vessel, native or graft     Coronary artery disease     Depressive disorder 1995     Gastroesophageal reflux disease 2020    Cleared with medication     Head injury 1954     Hiatal hernia 2015    Right Side     History of blood transfusion 12/15/2004    Prostate Surgery - My own blood     Hyperlipidemia      Hypertension      Inguinal hernia      Kidney problem 10/08/2017    Lithotripsy     Kidney stones      Malignant neoplasm of prostate (H)     Prostate cancer     Prostate cancer (H)        PAST SURGICAL HISTORY:  Past Surgical History:   Procedure Laterality Date     ARTHRODESIS FOOT  7/23/2013    Procedure: ARTHRODESIS FOOT;  Great Toe Arthrodesis Left Foot;  Surgeon: Ash Gonzalez DPM;  Location: [facility] OR     ARTHRODESIS FOOT  6/10/2014    Procedure: ARTHRODESIS FOOT;  Surgeon: Ash Gonzalez DPM;  Location: [facility] OR     BIOPSY  10/1/2004    dermatologist biopsies     C LAPAROSCOPY, SURGICAL PROSTATECTOMY, RETROPUBIC RADICAL, W/NERVE SPARING  11/30/2004    With full bilateral pelvic lymphadenectomy.  [facility].     C TOTAL KNEE ARTHROPLASTY  05/01/08    Left knee     COLONOSCOPY  10/7/2013    Procedure: COLONOSCOPY;  Colonoscopy;  Surgeon: Mike Fallon MD;  Location:  GI     ESOPHAGOSCOPY, GASTROSCOPY, DUODENOSCOPY (EGD), COMBINED N/A 2/12/2020    Procedure: ESOPHAGOGASTRODUODENOSCOPY (EGD);  Surgeon: Sam Escobar MD;  Location:  GI     EXTRACORPOREAL SHOCK WAVE LITHOTRIPSY (ESWL) Bilateral 10/18/2017    Procedure: EXTRACORPOREAL SHOCK WAVE  LITHOTRIPSY (ESWL);  BILATERAL EXTRACORPOREAL SHOCKWAVE LITHOTRIPSY ;  Surgeon: Mier Torres MD;  Location: [facility] OR     HC CORRECT BUNION,SIMPLE  08/11/2005    x3     HC REMV TOE BENIGN BONE LESN  08/11/2005     HEAD & NECK SURGERY  1954    From a fall     HERNIORRHAPHY INGUINAL  7/3/2013    Procedure: HERNIORRHAPHY INGUINAL;  Open Repair Inguinal hernia Right with mesh ;  Surgeon: Sam Escobar MD;  Location: [facility] OR     IR GASTROSTOMY TUBE PERCUTANEOUS PLCMNT  6/7/2021     MOHS MICROGRAPHIC PROCEDURE  08/23/11    ear and chin-[facility]     OPEN REDUCTION INTERNAL FIXATION WRIST Right 7/18/2017    Procedure: OPEN REDUCTION INTERNAL FIXATION WRIST;  Right distal radius open reduction and internal fixation;  Surgeon: Pedro Blanca DO;  Location: [facility]     RECONSTRUCT FOREFOOT WITH METATARSOPHALANGEAL (MTP) FUSION  6/10/2014    Procedure: RECONSTRUCT FOREFOOT WITH METATARSOPHALANGEAL (MTP) FUSION;  Surgeon: Ash Gonzalez DPM;  Location: [facility] OR     STENT, CORONARY, DEMI       SURGICAL HISTORY OF -   1999/1974    lt knee     SURGICAL HISTORY OF -   10/2004    lithotripsy     SURGICAL HISTORY OF - 11/05    angiogram with stent     VASCULAR SURGERY  11/17/2005    Puncture of iliac artery during and angiogram       FAMILY HISTORY:  Family History   Problem Relation Age of Onset     Hypertension Father         Lived to age 87     Connective Tissue Disorder Mother         LUPUS     Heart Disease Mother         Valve replacement     Anxiety Disorder Mother         Lived to age 84     Dementia Mother         Nursing Home (lived to age 86)       SOCIAL HISTORY:  Social History     Socioeconomic History     Marital status:      Spouse name: Alessandra     Number of children: 2     Years of education: Not on file     Highest education level: Not on file   Occupational History     Occupation: Retired   Tobacco Use     Smoking status: Never Smoker     Smokeless tobacco: Never Used  "  Substance and Sexual Activity     Alcohol use: Yes     Alcohol/week: 8.3 standard drinks     Types: 10 Cans of beer per week     Comment: occasional     Drug use: No     Sexual activity: Yes     Partners: Female     Birth control/protection: Female Surgical   Other Topics Concern      Service Not Asked     Blood Transfusions Not Asked     Caffeine Concern Not Asked     Occupational Exposure Not Asked     Hobby Hazards Not Asked     Sleep Concern Not Asked     Stress Concern Not Asked     Weight Concern Not Asked     Special Diet Not Asked     Back Care Not Asked     Exercise Not Asked     Bike Helmet Not Asked     Seat Belt Not Asked     Self-Exams Not Asked     Parent/sibling w/ CABG, MI or angioplasty before 65F 55M? No   Social History Narrative     Not on file     Social Determinants of Health     Financial Resource Strain: Not on file   Food Insecurity: Not on file   Transportation Needs: Not on file   Physical Activity: Not on file   Stress: Not on file   Social Connections: Not on file   Intimate Partner Violence: Not on file   Housing Stability: Not on file       Review of Systems:  Skin:  Negative       Eyes:  Negative      ENT:  Negative      Respiratory:  Negative       Cardiovascular:  Negative for;palpitations;chest pain;edema;lightheadedness;dizziness Positive for;fatigue    Gastroenterology: Negative      Genitourinary:  Negative      Musculoskeletal:  Negative      Neurologic:  Negative      Psychiatric:  Negative      Heme/Lymph/Imm:  Positive for allergies    Endocrine:  Positive for thyroid disorder      Physical Exam:  Vitals: /64 (BP Location: Left arm, Patient Position: Sitting, Cuff Size: Adult Regular)   Pulse 74   Ht 1.727 m (5' 8\")   Wt 93.5 kg (206 lb 1.6 oz)   SpO2 98%   BMI 31.34 kg/m      Constitutional:           Skin:             Head:           Eyes:           Lymph:      ENT:           Neck:           Respiratory:            Cardiac:                              "                              GI:           Extremities and Muscular Skeletal:                 Neurological:           Psych:           CC  No referring provider defined for this encounter.

## 2021-11-13 NOTE — PROGRESS NOTES
Dear Dr. Christensen:    I had the pleasure of seeing Quan Murphy in follow-up today at the [facility].     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 The patient has a history of foreign body sensation on the left side starting a few months ago. He tried gargling and using a neti pot with no improvement. He also developed left neck discomfort which radiates into his head. He saw primary care on 3/4/2021. He was treated with a course of antibiotics with no improvement. He saw seen by Dr Christensen on 3/22/2021. At that time he had an enlarged left tonsil. A biopsy was performed at that time which was consistent with a p16+ SCC. He had a CT scan on 3/23/2021 which showed a 2.9 x 2.7 x 3.5 cm left tonsil cancer, involving the soft palate, left level II and III lymphadenopathy. He had a PET scan performed today which showed the primary tumor in the tonsil and soft palate, left-sided lymphadenopathy with SHAYNE present (3.4 x 2.0 cm, 1.8 x 1.8 cm), right-sided node (1.4 x 0.9 cm) with FDG activity, no distant disease.    He was treated with definitive chemoradiation from 4/28/2021 to 6/12/2021. He had a total of 6996 cGy under the care of Dr Robbins. He had weekly cisplatin for a total of 5 cycles under the care of Dr Schrader. He had a difficult time with pain control during his treatment secondary to his chronic pain medication use and pain medications were managed by palliative care. He was hospitalized from 6/2/2021 to 6/9/2021 for failure to thrive. He had reactive PEG tube placement on 6/7/2021 due to inability to maintain adequate nutrition and intolerance of the idea of an NG tube.  His PET scan was negative in September 2021.      Interval history:  He comes in today for follow-up. He was last seen in September 2021. His PET was negative at that time. He was started on synthroid for hypothyroidism. His TSH recheck  this month was normal. He saw medical oncology in September 2021. He has been seeing the lymphedema therapist. His pain control has been handed over from palliative care back to his PCP. He saw his PCP in September for ear issues, treated with a course of antibiotics. He has been seen by urology in November 2021 for rising PSA. Dr Christensen performed a myringotomy in November 2021 for his ear issues. He saw cardiology in November 2021.    He says that things are recovering some from his treatment. He continues to deal with dry mouth. He says his taste is about 30% normal. He says his eating is fine. He is doing his swallowing exercises, stopped doing his stretching exercises. He noticed after stopping the exercises that he had some problems with chewing. He says his weight is increased to stable. He thinks his hearing is better since the myringotomy. His other ear related symptoms have also resolved. He says he is drained of energy and lacks motivation. He is going to the gym occasionally. He stopped the salt and soda rinses. He has no sore throat. He has not been to see the dentist. He is not using a humidifier. He is doing neck massage daily.       MEDICATIONS:     Current Outpatient Medications   Medication Sig Dispense Refill     ALPRAZolam (XANAX) 0.5 MG tablet TAKE ONE TABLET BY MOUTH THREE TIMES A DAY AS NEEDED FOR ANXIETY 60 tablet 0     cetirizine (ZYRTEC) 10 MG tablet Take 10 mg by mouth       cevimeline (EVOXAC) 30 MG capsule Take 1 capsule (30 mg) by mouth 3 times daily 120 capsule 11     citalopram (CELEXA) 40 MG tablet TAKE ONE TABLET BY MOUTH ONCE DAILY 90 tablet 0     dronabinol (MARINOL) 2.5 MG capsule Take 1 capsule (2.5 mg) by mouth 2 times daily (before meals) 28 capsule 3     HYDROcodone-acetaminophen (NORCO)  MG per tablet Take 1-2 tablets by mouth every 4 hours as needed for severe pain . Take maximum of 6 tabs a day. 180 tablet 0     hydrOXYzine (VISTARIL) 25 MG capsule Take 1 capsule  (25 mg) by mouth 4 times daily as needed for anxiety 120 capsule 4     ibuprofen (ADVIL/MOTRIN) 200 MG tablet Take 200 mg by mouth       levothyroxine (SYNTHROID/LEVOTHROID) 137 MCG tablet Take 1 tablet (137 mcg) by mouth daily 30 tablet 11     metoprolol succinate ER (TOPROL-XL) 50 MG 24 hr tablet Take 1 tablet (50 mg) by mouth daily 90 tablet 0     pilocarpine (SALAGEN) 5 MG tablet Take 1 tablet (5 mg) by mouth 3 times daily 90 tablet 3     prochlorperazine (COMPAZINE) 5 MG tablet TAKE 1 TABLET BY MOUTH EVERY 6 HOURS AS NEEDED FOR NAUSEA OR VOMITING       rosuvastatin (CRESTOR) 40 MG tablet Take 1 tablet (40 mg) by mouth daily 90 tablet 0     testosterone (ANDROGEL 1.62 % PUMP) 20.25 MG/ACT gel testosterone 20.25 mg/1.25 gram (1.62 %) transdermal gel pump       zolpidem ER (AMBIEN CR) 12.5 MG CR tablet Take 1 tablet (12.5 mg) by mouth nightly as needed for sleep 30 tablet 3     amoxicillin-clavulanate (AUGMENTIN-ES) 600-42.9 MG/5ML suspension Take 5 mLs (600 mg) by mouth 2 times daily (Patient not taking: Reported on 9/17/2021) 200 mL 0     ASPIRIN ADULT LOW STRENGTH 81 MG EC tablet TAKE ONE TABLET BY MOUTH ONCE DAILY (Patient not taking: Reported on 9/17/2021) 90 tablet 1     fluconazole (DIFLUCAN) 200 MG tablet Take 1 tablet (200 mg) by mouth daily (Patient not taking: Reported on 11/3/2021) 7 tablet 0     naloxone (NARCAN) 4 MG/0.1ML nasal spray Spray 1 spray (4 mg) into one nostril alternating nostrils as needed for opioid reversal every 2-3 minutes until assistance arrives (Patient not taking: Reported on 9/30/2021) 0.2 mL      nitroGLYcerin (NITROSTAT) 0.4 MG sublingual tablet For chest pain place 1 tablet under the tongue every 5 minutes for 3 doses. If symptoms persist 5 minutes after 1st dose call 911. (Patient not taking: Reported on 9/30/2021) 25 tablet 0     nystatin (MYCOSTATIN) 570657 UNIT/ML suspension Take 5 mLs (500,000 Units) by mouth 4 times daily (Patient not taking: Reported on 9/30/2021) 240  mL 0     omeprazole (PRILOSEC) 40 MG DR capsule Take 1 capsule (40 mg) by mouth daily (Patient not taking: Reported on 9/30/2021) 90 capsule 3       ALLERGIES:    Allergies   Allergen Reactions     Animal Dander      Azithromycin Nausea and Vomiting     Dust Mites      Pollen Extract      Smoke.        HABITS/SOCIAL HISTORY:   Nonsmoker. Chewing tobacco use occasionally when fishing. . Drink 2-3 beers per day.   Lives with wife (nurse).   Retired .     Social History     Socioeconomic History     Marital status:      Spouse name: Alessandra     Number of children: 2     Years of education: Not on file     Highest education level: Not on file   Occupational History     Occupation: Retired   Tobacco Use     Smoking status: Never Smoker     Smokeless tobacco: Never Used   Substance and Sexual Activity     Alcohol use: Yes     Alcohol/week: 8.3 standard drinks     Types: 10 Cans of beer per week     Comment: occasional     Drug use: No     Sexual activity: Yes     Partners: Female     Birth control/protection: Female Surgical   Other Topics Concern      Service Not Asked     Blood Transfusions Not Asked     Caffeine Concern Not Asked     Occupational Exposure Not Asked     Hobby Hazards Not Asked     Sleep Concern Not Asked     Stress Concern Not Asked     Weight Concern Not Asked     Special Diet Not Asked     Back Care Not Asked     Exercise Not Asked     Bike Helmet Not Asked     Seat Belt Not Asked     Self-Exams Not Asked     Parent/sibling w/ CABG, MI or angioplasty before 65F 55M? No   Social History Narrative     Not on file     Social Determinants of Health     Financial Resource Strain: Not on file   Food Insecurity: Not on file   Transportation Needs: Not on file   Physical Activity: Not on file   Stress: Not on file   Social Connections: Not on file   Intimate Partner Violence: Not on file   Housing Stability: Not on file       PAST MEDICAL HISTORY:   Past Medical History:    Diagnosis Date     Allergic rhinitis      Allergy, unspecified not elsewhere classified     Seasonal allergies, pollen, dust, smoke and animals     Antiplatelet or antithrombotic long-term use      Anxiety      Arthritis      Chest pain      Chronic sinusitis      Coronary atherosclerosis of unspecified type of vessel, native or graft     Coronary artery disease     Depressive disorder 1995     Gastroesophageal reflux disease 2020    Cleared with medication     Head injury 1954     Hiatal hernia 2015    Right Side     History of blood transfusion 12/15/2004    Prostate Surgery - My own blood     Hyperlipidemia      Hypertension      Inguinal hernia      Kidney problem 10/08/2017    Lithotripsy     Kidney stones      Malignant neoplasm of prostate (H)     Prostate cancer     Prostate cancer (H)         PAST SURGICAL HISTORY:   Past Surgical History:   Procedure Laterality Date     ARTHRODESIS FOOT  7/23/2013    Procedure: ARTHRODESIS FOOT;  Great Toe Arthrodesis Left Foot;  Surgeon: Ash Gonzalez DPM;  Location: [facility] OR     ARTHRODESIS FOOT  6/10/2014    Procedure: ARTHRODESIS FOOT;  Surgeon: Ash Gonzalez DPM;  Location: [facility] OR     BIOPSY  10/1/2004    dermatologist biopsies     C LAPAROSCOPY, SURGICAL PROSTATECTOMY, RETROPUBIC RADICAL, W/NERVE SPARING  11/30/2004    With full bilateral pelvic lymphadenectomy.  -[facility].     C TOTAL KNEE ARTHROPLASTY  05/01/08    Left knee     COLONOSCOPY  10/7/2013    Procedure: COLONOSCOPY;  Colonoscopy;  Surgeon: Mike Fallon MD;  Location:  GI     ESOPHAGOSCOPY, GASTROSCOPY, DUODENOSCOPY (EGD), COMBINED N/A 2/12/2020    Procedure: ESOPHAGOGASTRODUODENOSCOPY (EGD);  Surgeon: Sam Escobar MD;  Location:  GI     EXTRACORPOREAL SHOCK WAVE LITHOTRIPSY (ESWL) Bilateral 10/18/2017    Procedure: EXTRACORPOREAL SHOCK WAVE LITHOTRIPSY (ESWL);  BILATERAL EXTRACORPOREAL SHOCKWAVE LITHOTRIPSY ;  Surgeon: Meir Torres MD;  Location:  OR      CORRECT  "BUNION,SIMPLE  08/11/2005    x3     HC REMV TOE BENIGN BONE LESN  08/11/2005     HEAD & NECK SURGERY  1954    From a fall     HERNIORRHAPHY INGUINAL  7/3/2013    Procedure: HERNIORRHAPHY INGUINAL;  Open Repair Inguinal hernia Right with mesh ;  Surgeon: Sam Escobar MD;  Location: [facility] OR     IR GASTROSTOMY TUBE PERCUTANEOUS PLCMNT  6/7/2021     MOHS MICROGRAPHIC PROCEDURE  08/23/11    ear and chin-[facility]     OPEN REDUCTION INTERNAL FIXATION WRIST Right 7/18/2017    Procedure: OPEN REDUCTION INTERNAL FIXATION WRIST;  Right distal radius open reduction and internal fixation;  Surgeon: Pedro Blanca DO;  Location: [facility]     RECONSTRUCT FOREFOOT WITH METATARSOPHALANGEAL (MTP) FUSION  6/10/2014    Procedure: RECONSTRUCT FOREFOOT WITH METATARSOPHALANGEAL (MTP) FUSION;  Surgeon: Ash Gonzalez DPM;  Location: [facility] OR     STENT, CORONARY, DEMI       SURGICAL HISTORY OF -   1999/1974    lt knee     SURGICAL HISTORY OF -   10/2004    lithotripsy     SURGICAL HISTORY OF -   11/05    angiogram with stent     VASCULAR SURGERY  11/17/2005    Puncture of iliac artery during and angiogram       FAMILY HISTORY:    Family History   Problem Relation Age of Onset     Hypertension Father         Lived to age 87     Connective Tissue Disorder Mother         LUPUS     Heart Disease Mother         Valve replacement     Anxiety Disorder Mother         Lived to age 84     Dementia Mother         Nursing Home (lived to age 86)       REVIEW OF SYSTEMS:  12 point ROS was negative other than the symptoms noted above in the HPI.  Patient Supplied Answers to Review of Systems  [facility] ENT ROS 7/25/2021   Constitutional Problems with sleep   Neurology -   Psychology -   Ears, Nose, Throat Nasal congestion or drainage, Sore throat, Trouble swallowing   Allergy/Immunology Allergies or hay fever   Hematologic -         PHYSICAL EXAMINATION:   Pulse 68   Temp 96.9  F (36.1  C) (Temporal)   Ht 1.727 m (5' 8\")   Wt 93.9 " kg (207 lb)   SpO2 97%   BMI 31.47 kg/m     Appearance:   normal; NAD, age-appropriate appearance, well-developed, normal habitus   Communication:   normal; communicates verbally, normal voice quality, slightly hoarse/rough quality   Head/Face:   inspection -  Normal; no scars or visible lesions   Salivary glands -  Normal size, no tenderness, swelling, or palpable masses   Facial strength -  Normal and symmetric    Skin:  normal, no rash   Ears:  auricle (AD) -  normal  EAC (AD) -  normal  TM (AD) -  Normal, no effusion  auricle (AS) -  normal  EAC (AS) -  normal  TM (AS) -  Crusting over myringotomy site, no effusion  Normal clinical speech reception   Nose:  Ext. inspection -  Normal  Internal Inspection -  Normal mucosa, septum, and turbinates   Nasopharynx:  normal mucosa, no masses   Oral Cavity:  lips -  Normal mucosa, oral competence, and stoma size   Age-appropriate dentition, healthy gingival mucosa   Hard palate, buccal, floor of mouth mucosa with xerostomia   Tongue - normal movement, no masses or mucosal lesions   Oropharynx:  There are radiation changes to the mucosa throughout.    There are no secretions in the vallecula  Palpation is limited of the oropharynx due to gag reflex, no palpable masses, no mucosal lesions  Base of tongue - no masses or mucosal lesions  No obvious residual disease   Hypopharynx:  Normal pyriform sinus    No pooled secretions    Larynx:  Epiglottis, false vocal cords, true vocal cords normal in appearance, bilaterally mobile cords   The left arytenoid and AE fold have mild radiation edema that is not obstructive - improved   Neck: No palpable masses  Lymphedema present but improved   Lymphatic:  No palpable masses   Cardiovascular:  warm, pink, well-perfused extremities without swelling, tenderness, or edema   Respiratory:  Normal respiratory effort, no stridor   Neuro/Psych.:  mood/affect -  normal  mental status -  normal         PROCEDURES:   Flexible fiberoptic  laryngoscopy: Scope exam was indicated due to tonsil cancer. Verbal consent was obtained. The nasal cavity was prepped with an aerosolized solution of topical anesthetic and vasoconstrictive agent. The scope was passed through the anterior nasal cavity and advanced. Inspection of the nasopharynx revealed no gross abnormality.  There is mucositis along the left lateral pharyngeal wall and extending into the left base of tongue.  There is no obvious residual mass in the left tonsil/oropharynx.  The epiglottis has very mild thickening.  There is radiation edema of the left AE fold and left arytenoid.  This is not obstructive.  The vocal cords are mobile bilaterally.  The piriform sinuses are clear. The airway is patent. Procedure tolerated well with no immediate complications noted.                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 He completed definitive chemoradiation on 6/12/2021.    He continues to recover from his treatment.  We discussed options for the dry mouth including sugar free lemon drops. He was encouraged to get a humidifier. He was encouraged to restart the salt and soda rinses. He can use a neti pot/sinus rinse.    He should make a dental appointment.    He was seen by SLP team today who reinforced continuing the exercises.    He is doing his lymphedema therapy at home.    His serous effusion has resolved with myringotomy. He had questions today about tube placement. We discussed the risks and benefits.     His TSH was normal on recheck in November 2021. He is on synthroid. Will recheck in May 2022.    We will plan on repeating his imaging in March 2022.    I would like to see him back in [facility] clinic in 2 months.      Thank you very much for the opportunity to participate in the care  of your patient.      Ligia Fang MD, M.D.  Otolaryngology- Head & Neck Surgery      This note was dictated with voice recognition software and then edited. Please excuse any unintentional errors.         CC:  Ahmet Robbins MD  Department of Radiation Oncology  [facility]      Too Christensen MD  [address] [place] [address]      Shanthi Schrader MD  Department of Medical Oncology  [facility]

## 2021-11-15 ENCOUNTER — THERAPY VISIT (OUTPATIENT)
Dept: SPEECH THERAPY | Facility: CLINIC | Age: 71
End: 2021-11-15
Payer: MEDICARE

## 2021-11-15 ENCOUNTER — LAB (OUTPATIENT)
Dept: LAB | Facility: CLINIC | Age: 71
End: 2021-11-15
Payer: MEDICARE

## 2021-11-15 ENCOUNTER — OFFICE VISIT (OUTPATIENT)
Dept: OTOLARYNGOLOGY | Facility: CLINIC | Age: 71
End: 2021-11-15
Payer: MEDICARE

## 2021-11-15 VITALS
HEART RATE: 68 BPM | HEIGHT: 68 IN | WEIGHT: 207 LBS | OXYGEN SATURATION: 97 % | BODY MASS INDEX: 31.37 KG/M2 | TEMPERATURE: 96.9 F

## 2021-11-15 DIAGNOSIS — C09.9 SQUAMOUS CELL CARCINOMA OF LEFT TONSIL (H): ICD-10-CM

## 2021-11-15 DIAGNOSIS — E78.5 HYPERLIPIDEMIA LDL GOAL <130: ICD-10-CM

## 2021-11-15 DIAGNOSIS — C09.9 SQUAMOUS CELL CARCINOMA OF TONSIL (H): ICD-10-CM

## 2021-11-15 DIAGNOSIS — R13.12 OROPHARYNGEAL DYSPHAGIA: Primary | ICD-10-CM

## 2021-11-15 DIAGNOSIS — C61 MALIGNANT NEOPLASM OF PROSTATE (H): ICD-10-CM

## 2021-11-15 DIAGNOSIS — C09.9 SQUAMOUS CELL CARCINOMA OF TONSIL (H): Primary | ICD-10-CM

## 2021-11-15 DIAGNOSIS — I10 BENIGN ESSENTIAL HYPERTENSION: ICD-10-CM

## 2021-11-15 LAB
ALT SERPL W P-5'-P-CCNC: 66 U/L (ref 0–70)
ANION GAP SERPL CALCULATED.3IONS-SCNC: 2 MMOL/L (ref 3–14)
BASOPHILS # BLD AUTO: 0 10E3/UL (ref 0–0.2)
BASOPHILS NFR BLD AUTO: 1 %
BUN SERPL-MCNC: 27 MG/DL (ref 7–30)
CALCIUM SERPL-MCNC: 9.5 MG/DL (ref 8.5–10.1)
CHLORIDE BLD-SCNC: 104 MMOL/L (ref 94–109)
CHOLEST SERPL-MCNC: 156 MG/DL
CO2 SERPL-SCNC: 30 MMOL/L (ref 20–32)
CREAT SERPL-MCNC: 1.18 MG/DL (ref 0.66–1.25)
EOSINOPHIL # BLD AUTO: 0.1 10E3/UL (ref 0–0.7)
EOSINOPHIL NFR BLD AUTO: 2 %
ERYTHROCYTE [DISTWIDTH] IN BLOOD BY AUTOMATED COUNT: 13.2 % (ref 10–15)
FASTING STATUS PATIENT QL REPORTED: YES
GFR SERPL CREATININE-BSD FRML MDRD: 62 ML/MIN/1.73M2
GLUCOSE BLD-MCNC: 106 MG/DL (ref 70–99)
HCT VFR BLD AUTO: 47.3 % (ref 40–53)
HDLC SERPL-MCNC: 51 MG/DL
HGB BLD-MCNC: 15.3 G/DL (ref 13.3–17.7)
IMM GRANULOCYTES # BLD: 0 10E3/UL
IMM GRANULOCYTES NFR BLD: 0 %
LDLC SERPL CALC-MCNC: 65 MG/DL
LYMPHOCYTES # BLD AUTO: 0.7 10E3/UL (ref 0.8–5.3)
LYMPHOCYTES NFR BLD AUTO: 16 %
MAGNESIUM SERPL-MCNC: 2.3 MG/DL (ref 1.6–2.3)
MCH RBC QN AUTO: 28 PG (ref 26.5–33)
MCHC RBC AUTO-ENTMCNC: 32.3 G/DL (ref 31.5–36.5)
MCV RBC AUTO: 87 FL (ref 78–100)
MONOCYTES # BLD AUTO: 0.6 10E3/UL (ref 0–1.3)
MONOCYTES NFR BLD AUTO: 13 %
NEUTROPHILS # BLD AUTO: 3 10E3/UL (ref 1.6–8.3)
NEUTROPHILS NFR BLD AUTO: 68 %
NONHDLC SERPL-MCNC: 105 MG/DL
NRBC # BLD AUTO: 0 10E3/UL
NRBC BLD AUTO-RTO: 0 /100
PLATELET # BLD AUTO: 148 10E3/UL (ref 150–450)
POTASSIUM BLD-SCNC: 4.3 MMOL/L (ref 3.4–5.3)
PSA SERPL-MCNC: 0.15 UG/L (ref 0–4)
RBC # BLD AUTO: 5.46 10E6/UL (ref 4.4–5.9)
SODIUM SERPL-SCNC: 136 MMOL/L (ref 133–144)
TRIGL SERPL-MCNC: 200 MG/DL
WBC # BLD AUTO: 4.4 10E3/UL (ref 4–11)

## 2021-11-15 PROCEDURE — 31575 DIAGNOSTIC LARYNGOSCOPY: CPT | Performed by: OTOLARYNGOLOGY

## 2021-11-15 PROCEDURE — 80048 BASIC METABOLIC PNL TOTAL CA: CPT | Performed by: PATHOLOGY

## 2021-11-15 PROCEDURE — 83735 ASSAY OF MAGNESIUM: CPT | Performed by: PATHOLOGY

## 2021-11-15 PROCEDURE — 84153 ASSAY OF PSA TOTAL: CPT | Performed by: PATHOLOGY

## 2021-11-15 PROCEDURE — 99214 OFFICE O/P EST MOD 30 MIN: CPT | Mod: 25 | Performed by: OTOLARYNGOLOGY

## 2021-11-15 PROCEDURE — 84460 ALANINE AMINO (ALT) (SGPT): CPT | Performed by: PATHOLOGY

## 2021-11-15 PROCEDURE — 36415 COLL VENOUS BLD VENIPUNCTURE: CPT | Performed by: PATHOLOGY

## 2021-11-15 PROCEDURE — 85025 COMPLETE CBC W/AUTO DIFF WBC: CPT | Performed by: PATHOLOGY

## 2021-11-15 PROCEDURE — 80061 LIPID PANEL: CPT | Performed by: PATHOLOGY

## 2021-11-15 PROCEDURE — 92526 ORAL FUNCTION THERAPY: CPT | Mod: GN | Performed by: SPEECH-LANGUAGE PATHOLOGIST

## 2021-11-15 ASSESSMENT — MIFFLIN-ST. JEOR: SCORE: 1673.45

## 2021-11-15 ASSESSMENT — PAIN SCALES - GENERAL: PAINLEVEL: NO PAIN (0)

## 2021-11-15 NOTE — PROGRESS NOTES
Rehabilitation Services      OUTPATIENT SPEECH LANGUAGE PATHOLOGY  PLAN OF TREATMENT FOR OUTPATIENT REHABILITATION    Patient's Last Name, First Name, M.I.                         YOB: 1950  Quan Murphy     Provider's Name   Yoselin Wendymisbah, SLP    Medical Record No.  [id number]      Start of Care Date:  04/27/21    Onset Date:  04/27/21   Type:     ___PT   ____OT  ___X_SLP Medical Diagnosis:  Oropharyngeal dysphagia      Treatment Diagnosis:  Mild oropharyngeal dysphagia expected to worsen to moderately severe during XRT           _________________________________________________________________________________  Plan of Treatment:     Frequency/Duration: 1x/2-3months in conjunction with ENT clinic visits            Goals:  Goal Identifier Diet   Goal Description 1. Pt will tolerate regular textures and thin liquids with no overt clinical s/sx of penetration/aspiration in 100% of PO trials.    Target Date 01/22/22   Date Met      Progress (detail required for progress note):  Goal not met. Pt demonstrates improvement in swallow function, but is still not taking regular textures/thin liquids with all meals. Reports xerostomia and dysgeusia are all barriers.He will benefit from ongoing SLP services to ensure tolerance of regular textures and thin liquids.      Goal Identifier Exercises   Goal Description 2. Pt will complete 10 reps of 5/5 oropharyngeal and jaw strengthening/ROM exercises with minimal written and/or verbal cues.    Target Date 01/22/22   Date Met      Progress (detail required for progress note):  Goal not met. Pt is intermittently completing exercises, but not at prescribed frequency. He will benefit from ongoing SLP services to facilitate exercise completion given risk of XRT induced fibrosis, and subsequent risk of progressive dysphagia which could lead to G tube dependence.          Certification date  from 10/24/2021 to 1/22/2022.    Yoselin Duong, SLP          I CERTIFY THE NEED FOR THESE SERVICES FURNISHED UNDER        THIS PLAN OF TREATMENT AND WHILE UNDER MY CARE     (Physician attestation of this document indicates review and certification of the therapy plan).                Referring Provider: Dr. Ligia Fang

## 2021-11-15 NOTE — LETTER
11/15/2021       RE: Quan Murphy  [address]  [facility] [address]     Dear Colleague,    Thank you for referring your patient, Quan Murphy, to the [facility] EAR NOSE AND THROAT CLINIC [place] at [facility]. Please see a copy of my visit note below.    Dear Dr. Christensen:    I had the pleasure of seeing Quan Murphy in follow-up today at the [facility] Otolaryngology [facility].     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 The patient has a history of foreign body sensation on the left side starting a few months ago. He tried gargling and using a neti pot with no improvement. He also developed left neck discomfort which radiates into his head. He saw primary care on 3/4/2021. He was treated with a course of antibiotics with no improvement. He saw seen by Dr Crhistensen on 3/22/2021. At that time he had an enlarged left tonsil. A biopsy was performed at that time which was consistent with a p16+ SCC. He had a CT scan on 3/23/2021 which showed a 2.9 x 2.7 x 3.5 cm left tonsil cancer, involving the soft palate, left level II and III lymphadenopathy. He had a PET scan performed today which showed the primary tumor in the tonsil and soft palate, left-sided lymphadenopathy with SHAYNE present (3.4 x 2.0 cm, 1.8 x 1.8 cm), right-sided node (1.4 x 0.9 cm) with FDG activity, no distant disease.    He was treated with definitive chemoradiation from 4/28/2021 to 6/12/2021. He had a total of 6996 cGy under the care of Dr Robbins. He had weekly cisplatin for a total of 5 cycles under the care of Dr Schrader. He had a difficult time with pain control during his treatment secondary to his chronic pain medication use and pain medications were managed by palliative care. He was hospitalized from 6/2/2021 to 6/9/2021 for failure to thrive. He had reactive PEG tube placement on 6/7/2021 due to  inability to maintain adequate nutrition and intolerance of the idea of an NG tube.  His PET scan was negative in September 2021.      Interval history:  He comes in today for follow-up. He was last seen in September 2021. His PET was negative at that time. He was started on synthroid for hypothyroidism. His TSH recheck this month was normal. He saw medical oncology in September 2021. He has been seeing the lymphedema therapist. His pain control has been handed over from palliative care back to his PCP. He saw his PCP in September for ear issues, treated with a course of antibiotics. He has been seen by urology in November 2021 for rising PSA. Dr Christensen performed a myringotomy in November 2021 for his ear issues. He saw cardiology in November 2021.    He says that things are recovering some from his treatment. He continues to deal with dry mouth. He says his taste is about 30% normal. He says his eating is fine. He is doing his swallowing exercises, stopped doing his stretching exercises. He noticed after stopping the exercises that he had some problems with chewing. He says his weight is increased to stable. He thinks his hearing is better since the myringotomy. His other ear related symptoms have also resolved. He says he is drained of energy and lacks motivation. He is going to the gym occasionally. He stopped the salt and soda rinses. He has no sore throat. He has not been to see the dentist. He is not using a humidifier. He is doing neck massage daily.       MEDICATIONS:     Current Outpatient Medications   Medication Sig Dispense Refill     ALPRAZolam (XANAX) 0.5 MG tablet TAKE ONE TABLET BY MOUTH THREE TIMES A DAY AS NEEDED FOR ANXIETY 60 tablet 0     cetirizine (ZYRTEC) 10 MG tablet Take 10 mg by mouth       cevimeline (EVOXAC) 30 MG capsule Take 1 capsule (30 mg) by mouth 3 times daily 120 capsule 11     citalopram (CELEXA) 40 MG tablet TAKE ONE TABLET BY MOUTH ONCE DAILY 90 tablet 0     dronabinol  (MARINOL) 2.5 MG capsule Take 1 capsule (2.5 mg) by mouth 2 times daily (before meals) 28 capsule 3     HYDROcodone-acetaminophen (NORCO)  MG per tablet Take 1-2 tablets by mouth every 4 hours as needed for severe pain . Take maximum of 6 tabs a day. 180 tablet 0     hydrOXYzine (VISTARIL) 25 MG capsule Take 1 capsule (25 mg) by mouth 4 times daily as needed for anxiety 120 capsule 4     ibuprofen (ADVIL/MOTRIN) 200 MG tablet Take 200 mg by mouth       levothyroxine (SYNTHROID/LEVOTHROID) 137 MCG tablet Take 1 tablet (137 mcg) by mouth daily 30 tablet 11     metoprolol succinate ER (TOPROL-XL) 50 MG 24 hr tablet Take 1 tablet (50 mg) by mouth daily 90 tablet 0     pilocarpine (SALAGEN) 5 MG tablet Take 1 tablet (5 mg) by mouth 3 times daily 90 tablet 3     prochlorperazine (COMPAZINE) 5 MG tablet TAKE 1 TABLET BY MOUTH EVERY 6 HOURS AS NEEDED FOR NAUSEA OR VOMITING       rosuvastatin (CRESTOR) 40 MG tablet Take 1 tablet (40 mg) by mouth daily 90 tablet 0     testosterone (ANDROGEL 1.62 % PUMP) 20.25 MG/ACT gel testosterone 20.25 mg/1.25 gram (1.62 %) transdermal gel pump       zolpidem ER (AMBIEN CR) 12.5 MG CR tablet Take 1 tablet (12.5 mg) by mouth nightly as needed for sleep 30 tablet 3     amoxicillin-clavulanate (AUGMENTIN-ES) 600-42.9 MG/5ML suspension Take 5 mLs (600 mg) by mouth 2 times daily (Patient not taking: Reported on 9/17/2021) 200 mL 0     ASPIRIN ADULT LOW STRENGTH 81 MG EC tablet TAKE ONE TABLET BY MOUTH ONCE DAILY (Patient not taking: Reported on 9/17/2021) 90 tablet 1     fluconazole (DIFLUCAN) 200 MG tablet Take 1 tablet (200 mg) by mouth daily (Patient not taking: Reported on 11/3/2021) 7 tablet 0     naloxone (NARCAN) 4 MG/0.1ML nasal spray Spray 1 spray (4 mg) into one nostril alternating nostrils as needed for opioid reversal every 2-3 minutes until assistance arrives (Patient not taking: Reported on 9/30/2021) 0.2 mL      nitroGLYcerin (NITROSTAT) 0.4 MG sublingual tablet For chest  pain place 1 tablet under the tongue every 5 minutes for 3 doses. If symptoms persist 5 minutes after 1st dose call 911. (Patient not taking: Reported on 9/30/2021) 25 tablet 0     nystatin (MYCOSTATIN) 105509 UNIT/ML suspension Take 5 mLs (500,000 Units) by mouth 4 times daily (Patient not taking: Reported on 9/30/2021) 240 mL 0     omeprazole (PRILOSEC) 40 MG DR capsule Take 1 capsule (40 mg) by mouth daily (Patient not taking: Reported on 9/30/2021) 90 capsule 3       ALLERGIES:    Allergies   Allergen Reactions     Animal Dander      Azithromycin Nausea and Vomiting     Dust Mites      Pollen Extract      Smoke.        HABITS/SOCIAL HISTORY:   Nonsmoker. Chewing tobacco use occasionally when fishing. . Drink 2-3 beers per day.   Lives with wife (nurse).   Retired .     Social History     Socioeconomic History     Marital status:      Spouse name: Alessandra     Number of children: 2     Years of education: Not on file     Highest education level: Not on file   Occupational History     Occupation: Retired   Tobacco Use     Smoking status: Never Smoker     Smokeless tobacco: Never Used   Substance and Sexual Activity     Alcohol use: Yes     Alcohol/week: 8.3 standard drinks     Types: 10 Cans of beer per week     Comment: occasional     Drug use: No     Sexual activity: Yes     Partners: Female     Birth control/protection: Female Surgical   Other Topics Concern      Service Not Asked     Blood Transfusions Not Asked     Caffeine Concern Not Asked     Occupational Exposure Not Asked     Hobby Hazards Not Asked     Sleep Concern Not Asked     Stress Concern Not Asked     Weight Concern Not Asked     Special Diet Not Asked     Back Care Not Asked     Exercise Not Asked     Bike Helmet Not Asked     Seat Belt Not Asked     Self-Exams Not Asked     Parent/sibling w/ CABG, MI or angioplasty before 65F 55M? No   Social History Narrative     Not on file     Social Determinants of Health      Financial Resource Strain: Not on file   Food Insecurity: Not on file   Transportation Needs: Not on file   Physical Activity: Not on file   Stress: Not on file   Social Connections: Not on file   Intimate Partner Violence: Not on file   Housing Stability: Not on file       PAST MEDICAL HISTORY:   Past Medical History:   Diagnosis Date     Allergic rhinitis      Allergy, unspecified not elsewhere classified     Seasonal allergies, pollen, dust, smoke and animals     Antiplatelet or antithrombotic long-term use      Anxiety      Arthritis      Chest pain      Chronic sinusitis      Coronary atherosclerosis of unspecified type of vessel, native or graft     Coronary artery disease     Depressive disorder 1995     Gastroesophageal reflux disease 2020    Cleared with medication     Head injury 1954     Hiatal hernia 2015    Right Side     History of blood transfusion 12/15/2004    Prostate Surgery - My own blood     Hyperlipidemia      Hypertension      Inguinal hernia      Kidney problem 10/08/2017    Lithotripsy     Kidney stones      Malignant neoplasm of prostate (H)     Prostate cancer     Prostate cancer (H)         PAST SURGICAL HISTORY:   Past Surgical History:   Procedure Laterality Date     ARTHRODESIS FOOT  7/23/2013    Procedure: ARTHRODESIS FOOT;  Great Toe Arthrodesis Left Foot;  Surgeon: Ash Gonzalez DPM;  Location: [facility] OR     ARTHRODESIS FOOT  6/10/2014    Procedure: ARTHRODESIS FOOT;  Surgeon: Ash Gonzalez DPM;  Location: [facility] OR     BIOPSY  10/1/2004    dermatologist biopsies     C LAPAROSCOPY, SURGICAL PROSTATECTOMY, RETROPUBIC RADICAL, W/NERVE SPARING  11/30/2004    With full bilateral pelvic lymphadenectomy.  F-[facility].     C TOTAL KNEE ARTHROPLASTY  05/01/08    Left knee     COLONOSCOPY  10/7/2013    Procedure: COLONOSCOPY;  Colonoscopy;  Surgeon: Mike Fallon MD;  Location:  GI     ESOPHAGOSCOPY, GASTROSCOPY, DUODENOSCOPY (EGD), COMBINED N/A 2/12/2020    Procedure:  ESOPHAGOGASTRODUODENOSCOPY (EGD);  Surgeon: Sam Escobar MD;  Location: [facility] GI     EXTRACORPOREAL SHOCK WAVE LITHOTRIPSY (ESWL) Bilateral 10/18/2017    Procedure: EXTRACORPOREAL SHOCK WAVE LITHOTRIPSY (ESWL);  BILATERAL EXTRACORPOREAL SHOCKWAVE LITHOTRIPSY ;  Surgeon: Meir Torres MD;  Location: [facility] OR     HC CORRECT BUNION,SIMPLE  08/11/2005    x3     HC REMV TOE BENIGN BONE LESN  08/11/2005     HEAD & NECK SURGERY  1954    From a fall     HERNIORRHAPHY INGUINAL  7/3/2013    Procedure: HERNIORRHAPHY INGUINAL;  Open Repair Inguinal hernia Right with mesh ;  Surgeon: Sam Escobar MD;  Location: [facility] OR     IR GASTROSTOMY TUBE PERCUTANEOUS PLCMNT  6/7/2021     MOHS MICROGRAPHIC PROCEDURE  08/23/11    ear and chin-[facility]     OPEN REDUCTION INTERNAL FIXATION WRIST Right 7/18/2017    Procedure: OPEN REDUCTION INTERNAL FIXATION WRIST;  Right distal radius open reduction and internal fixation;  Surgeon: Pedro Blanca DO;  Location: [facility] OR     RECONSTRUCT FOREFOOT WITH METATARSOPHALANGEAL (MTP) FUSION  6/10/2014    Procedure: RECONSTRUCT FOREFOOT WITH METATARSOPHALANGEAL (MTP) FUSION;  Surgeon: Ash Gonzalez DPM;  Location: [facility] OR     STENT, CORONARY, DEMI       SURGICAL HISTORY OF -   1999/1974    lt knee     SURGICAL HISTORY OF -   10/2004    lithotripsy     SURGICAL HISTORY OF -   11/05    angiogram with stent     VASCULAR SURGERY  11/17/2005    Puncture of iliac artery during and angiogram       FAMILY HISTORY:    Family History   Problem Relation Age of Onset     Hypertension Father         Lived to age 87     Connective Tissue Disorder Mother         LUPUS     Heart Disease Mother         Valve replacement     Anxiety Disorder Mother         Lived to age 84     Dementia Mother         Nursing Home (lived to age 86)       REVIEW OF SYSTEMS:  12 point ROS was negative other than the symptoms noted above in the HPI.  Patient Supplied Answers to Review of Systems  [facility] ENT ROS  "7/25/2021   Constitutional Problems with sleep   Neurology -   Psychology -   Ears, Nose, Throat Nasal congestion or drainage, Sore throat, Trouble swallowing   Allergy/Immunology Allergies or hay fever   Hematologic -         PHYSICAL EXAMINATION:   Pulse 68   Temp 96.9  F (36.1  C) (Temporal)   Ht 1.727 m (5' 8\")   Wt 93.9 kg (207 lb)   SpO2 97%   BMI 31.47 kg/m     Appearance:   normal; NAD, age-appropriate appearance, well-developed, normal habitus   Communication:   normal; communicates verbally, normal voice quality, slightly hoarse/rough quality   Head/Face:   inspection -  Normal; no scars or visible lesions   Salivary glands -  Normal size, no tenderness, swelling, or palpable masses   Facial strength -  Normal and symmetric    Skin:  normal, no rash   Ears:  auricle (AD) -  normal  EAC (AD) -  normal  TM (AD) -  Normal, no effusion  auricle (AS) -  normal  EAC (AS) -  normal  TM (AS) -  Crusting over myringotomy site, no effusion  Normal clinical speech reception   Nose:  Ext. inspection -  Normal  Internal Inspection -  Normal mucosa, septum, and turbinates   Nasopharynx:  normal mucosa, no masses   Oral Cavity:  lips -  Normal mucosa, oral competence, and stoma size   Age-appropriate dentition, healthy gingival mucosa   Hard palate, buccal, floor of mouth mucosa with xerostomia   Tongue - normal movement, no masses or mucosal lesions   Oropharynx:  There are radiation changes to the mucosa throughout.    There are no secretions in the vallecula  Palpation is limited of the oropharynx due to gag reflex, no palpable masses, no mucosal lesions  Base of tongue - no masses or mucosal lesions  No obvious residual disease   Hypopharynx:  Normal pyriform sinus    No pooled secretions    Larynx:  Epiglottis, false vocal cords, true vocal cords normal in appearance, bilaterally mobile cords   The left arytenoid and AE fold have mild radiation edema that is not obstructive - improved   Neck: No palpable " masses  Lymphedema present but improved   Lymphatic:  No palpable masses   Cardiovascular:  warm, pink, well-perfused extremities without swelling, tenderness, or edema   Respiratory:  Normal respiratory effort, no stridor   Neuro/Psych.:  mood/affect -  normal  mental status -  normal         PROCEDURES:   Flexible fiberoptic laryngoscopy: Scope exam was indicated due to tonsil cancer. Verbal consent was obtained. The nasal cavity was prepped with an aerosolized solution of topical anesthetic and vasoconstrictive agent. The scope was passed through the anterior nasal cavity and advanced. Inspection of the nasopharynx revealed no gross abnormality.  There is mucositis along the left lateral pharyngeal wall and extending into the left base of tongue.  There is no obvious residual mass in the left tonsil/oropharynx.  The epiglottis has very mild thickening.  There is radiation edema of the left AE fold and left arytenoid.  This is not obstructive.  The vocal cords are mobile bilaterally.  The piriform sinuses are clear. The airway is patent. Procedure tolerated well with no immediate complications noted.                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 He completed definitive chemoradiation on 6/12/2021.    He continues to recover from his treatment.  We discussed options for the dry mouth including sugar free lemon drops. He was encouraged to get a humidifier. He was encouraged to restart the salt and soda rinses. He can use a neti pot/sinus rinse.    He should make a dental appointment.    He was seen by SLP team today who reinforced continuing the exercises.    He is doing his lymphedema therapy at home.    His serous effusion has resolved with myringotomy. He had questions today about tube  placement. We discussed the risks and benefits.     His TSH was normal on recheck in November 2021. He is on synthroid. Will recheck in May 2022.    We will plan on repeating his imaging in March 2022.    I would like to see him back in multiD clinic in 2 months.      Thank you very much for the opportunity to participate in the care of your patient.      Ligia Fang MD, M.D.  Otolaryngology- Head & Neck Surgery      This note was dictated with voice recognition software and then edited. Please excuse any unintentional errors.         CC:  Ahmet Robbins MD  Department of Radiation Oncology  [facility]      Too Christensen MD  [address] [place] [address]      Shanthi Schrader MD  Department of Medical Oncology  [facility]

## 2021-11-22 DIAGNOSIS — E78.5 HYPERLIPIDEMIA LDL GOAL <130: ICD-10-CM

## 2021-11-22 RX ORDER — ROSUVASTATIN CALCIUM 40 MG/1
40 TABLET, COATED ORAL DAILY
Qty: 90 TABLET | Refills: 3 | Status: SHIPPED | OUTPATIENT
Start: 2021-11-22 | End: 2022-11-15

## 2021-11-24 ENCOUNTER — MYC REFILL (OUTPATIENT)
Dept: FAMILY MEDICINE | Facility: CLINIC | Age: 71
End: 2021-11-24

## 2021-11-24 DIAGNOSIS — C09.9 SQUAMOUS CELL CARCINOMA OF LEFT TONSIL (H): ICD-10-CM

## 2021-11-24 DIAGNOSIS — G89.3 NEOPLASM RELATED PAIN: ICD-10-CM

## 2021-11-26 ENCOUNTER — MYC REFILL (OUTPATIENT)
Dept: INTERNAL MEDICINE | Facility: CLINIC | Age: 71
End: 2021-11-26
Payer: MEDICARE

## 2021-11-26 DIAGNOSIS — F41.9 ANXIETY: ICD-10-CM

## 2021-11-26 RX ORDER — HYDROCODONE BITARTRATE AND ACETAMINOPHEN 10; 325 MG/1; MG/1
1 TABLET ORAL EVERY 6 HOURS PRN
Qty: 28 TABLET | Refills: 0 | Status: SHIPPED | OUTPATIENT
Start: 2021-11-26 | End: 2021-11-29

## 2021-11-26 NOTE — TELEPHONE ENCOUNTER
Pending Prescriptions:                       Disp   Refills    HYDROcodone-acetaminophen (NORCO)  M*180 ta*0        Sig: Take 1-2 tablets by mouth every 4 hours as needed for           severe pain . Take maximum of 6 tabs a day.      Routing refill request to provider for review/approval because:  Drug not on the [facility] refill protocol     Anita Damon RN on 11/26/2021 at 9:18 AM

## 2021-11-28 ENCOUNTER — MYC MEDICAL ADVICE (OUTPATIENT)
Dept: FAMILY MEDICINE | Facility: CLINIC | Age: 71
End: 2021-11-28
Payer: MEDICARE

## 2021-11-28 DIAGNOSIS — G89.3 NEOPLASM RELATED PAIN: ICD-10-CM

## 2021-11-28 DIAGNOSIS — C09.9 SQUAMOUS CELL CARCINOMA OF LEFT TONSIL (H): ICD-10-CM

## 2021-11-29 NOTE — TELEPHONE ENCOUNTER
Pending Prescriptions:                       Disp   Refills    ALPRAZolam (XANAX) 0.5 MG tablet           60 tab*0            Routing refill request to provider for review/approval because:  Drug not on the [facility] refill protocol     Anita Damon RN on 11/29/2021 at 8:37 AM

## 2021-11-30 RX ORDER — HYDROCODONE BITARTRATE AND ACETAMINOPHEN 10; 325 MG/1; MG/1
1 TABLET ORAL EVERY 6 HOURS PRN
Qty: 120 TABLET | Refills: 0 | Status: SHIPPED | OUTPATIENT
Start: 2021-11-30 | End: 2021-12-27

## 2021-11-30 RX ORDER — ALPRAZOLAM 0.5 MG
0.5 TABLET ORAL 3 TIMES DAILY PRN
Qty: 60 TABLET | Refills: 0 | Status: SHIPPED | OUTPATIENT
Start: 2021-11-30 | End: 2022-02-02

## 2021-12-06 DIAGNOSIS — G47.01 SLEEP DISORDER DUE TO A GENERAL MEDICAL CONDITION, INSOMNIA TYPE: ICD-10-CM

## 2021-12-06 RX ORDER — ZOLPIDEM TARTRATE 12.5 MG/1
12.5 TABLET, FILM COATED, EXTENDED RELEASE ORAL
Qty: 30 TABLET | Refills: 3 | Status: SHIPPED | OUTPATIENT
Start: 2021-12-06 | End: 2022-04-01

## 2021-12-13 DIAGNOSIS — F41.9 ANXIETY: ICD-10-CM

## 2021-12-13 NOTE — PROGRESS NOTES
This is a recent snapshot of the patient's [facility] medical record.  For current drug dose and complete information and questions, call [phone] or In Basket pool, [facility] home infusion ([id number])  CSN Number:  [id number]

## 2021-12-13 NOTE — PROGRESS NOTES
This is a recent snapshot of the patient's [facility] medical record.  For current drug dose and complete information and questions, call [phone] or In Basket pool, [facility]v home infusion ([id number])  CSN Number:  [id number]

## 2021-12-15 RX ORDER — CITALOPRAM HYDROBROMIDE 40 MG/1
TABLET ORAL
Qty: 90 TABLET | Refills: 0 | Status: SHIPPED | OUTPATIENT
Start: 2021-12-15 | End: 2022-03-17

## 2021-12-15 NOTE — TELEPHONE ENCOUNTER
Pending Prescriptions:                       Disp   Refills    citalopram (CELEXA) 40 MG tablet [Pharmacy*90 tab*0        Sig: TAKE ONE TABLET BY MOUTH ONCE DAILY      Routing refill request to provider for review/approval because:  Drug interaction warning    Aniyah Medeiros RN

## 2021-12-27 ENCOUNTER — MYC REFILL (OUTPATIENT)
Dept: INTERNAL MEDICINE | Facility: CLINIC | Age: 71
End: 2021-12-27
Payer: MEDICARE

## 2021-12-27 ENCOUNTER — MYC REFILL (OUTPATIENT)
Dept: FAMILY MEDICINE | Facility: CLINIC | Age: 71
End: 2021-12-27
Payer: MEDICARE

## 2021-12-27 DIAGNOSIS — G89.3 NEOPLASM RELATED PAIN: ICD-10-CM

## 2021-12-27 DIAGNOSIS — F41.9 ANXIETY: ICD-10-CM

## 2021-12-27 DIAGNOSIS — C09.9 SQUAMOUS CELL CARCINOMA OF LEFT TONSIL (H): ICD-10-CM

## 2021-12-27 RX ORDER — HYDROCODONE BITARTRATE AND ACETAMINOPHEN 10; 325 MG/1; MG/1
1 TABLET ORAL EVERY 6 HOURS PRN
Qty: 30 TABLET | Refills: 0 | Status: SHIPPED | OUTPATIENT
Start: 2021-12-27 | End: 2021-12-30

## 2021-12-27 RX ORDER — ALPRAZOLAM 0.5 MG
0.5 TABLET ORAL 2 TIMES DAILY PRN
Qty: 60 TABLET | Refills: 0 | Status: SHIPPED | OUTPATIENT
Start: 2021-12-27 | End: 2022-01-24

## 2021-12-27 NOTE — TELEPHONE ENCOUNTER
HYDROcodone-acetaminophen (NORCO)  MG per tablet         Last Written Prescription Date:  11/30/21  Last Fill Quantity: 120,   # refills: 0  Last Office Visit: 9/30/21  Future Office visit:    Next 5 appointments (look out 90 days)    Feb 02, 2022  1:00 PM  Return Visit with David Rogers MD  [facility] ([facility] ) [address] [place] [id number] [phone]           Routing refill request to provider for review/approval because:  Drug not on the [facility], [facility] or [facility] refill protocol or controlled substance

## 2021-12-27 NOTE — TELEPHONE ENCOUNTER
ALPRAZolam (XANAX) 0.5 MG tablet        Last Written Prescription Date:  11/30/21  Last Fill Quantity: 60,   # refills: 0  Last Office Visit: 9/30/21  Future Office visit:    Next 5 appointments (look out 90 days)    Feb 02, 2022  1:00 PM  Return Visit with David Rogers MD  [facility] ([facility] ) [address] [place] [id number] [phone]           Routing refill request to provider for review/approval because:  Drug not on the [facility], [facility] or [facility] refill protocol or controlled substance

## 2021-12-29 ENCOUNTER — MYC MEDICAL ADVICE (OUTPATIENT)
Dept: FAMILY MEDICINE | Facility: CLINIC | Age: 71
End: 2021-12-29
Payer: MEDICARE

## 2021-12-29 ENCOUNTER — MYC REFILL (OUTPATIENT)
Dept: FAMILY MEDICINE | Facility: CLINIC | Age: 71
End: 2021-12-29
Payer: MEDICARE

## 2021-12-29 DIAGNOSIS — G89.3 NEOPLASM RELATED PAIN: ICD-10-CM

## 2021-12-29 DIAGNOSIS — C09.9 SQUAMOUS CELL CARCINOMA OF LEFT TONSIL (H): ICD-10-CM

## 2021-12-29 RX ORDER — HYDROCODONE BITARTRATE AND ACETAMINOPHEN 10; 325 MG/1; MG/1
1 TABLET ORAL EVERY 6 HOURS PRN
Qty: 30 TABLET | Refills: 0 | Status: CANCELLED | OUTPATIENT
Start: 2021-12-29

## 2021-12-29 NOTE — TELEPHONE ENCOUNTER
norco      Last Written Prescription Date:  12/27/21  Last Fill Quantity: 30,   # refills: 0  Last Office Visit: 9/30/21  Future Office visit:    Next 5 appointments (look out 90 days)    Feb 02, 2022  1:00 PM  Return Visit with David Rogers MD  [facility] ([facility] ) [address] [place] [id number]  [phone]           Routing refill request to provider for review/approval because:  Drug not on the [facility], [facility] or [facility] refill protocol or controlled substance

## 2021-12-30 DIAGNOSIS — G89.3 NEOPLASM RELATED PAIN: ICD-10-CM

## 2021-12-30 DIAGNOSIS — C09.9 SQUAMOUS CELL CARCINOMA OF LEFT TONSIL (H): ICD-10-CM

## 2021-12-30 RX ORDER — HYDROCODONE BITARTRATE AND ACETAMINOPHEN 10; 325 MG/1; MG/1
TABLET ORAL
Qty: 30 TABLET | Refills: 0 | Status: SHIPPED | OUTPATIENT
Start: 2021-12-30 | End: 2022-02-02

## 2021-12-30 NOTE — TELEPHONE ENCOUNTER
Norco      Last Written Prescription Date:  12/27/2021  Last Fill Quantity: 30,   # refills: 0  Last Office Visit: 9/30/2021  Future Office visit:    Next 5 appointments (look out 90 days)      Feb 02, 2022  1:00 PM  Return Visit with David Rogers MD  [facility] ([facility] ) [address] [place] [id number]  [phone]             Routing refill request to provider for review/approval because:  Drug not on the [facility], [facility] or [facility] refill protocol or controlled substance

## 2022-01-09 ENCOUNTER — MYC REFILL (OUTPATIENT)
Dept: INTERNAL MEDICINE | Facility: CLINIC | Age: 72
End: 2022-01-09
Payer: MEDICARE

## 2022-01-09 DIAGNOSIS — C09.9 SQUAMOUS CELL CARCINOMA OF LEFT TONSIL (H): ICD-10-CM

## 2022-01-09 DIAGNOSIS — G89.3 NEOPLASM RELATED PAIN: ICD-10-CM

## 2022-01-09 RX ORDER — HYDROCODONE BITARTRATE AND ACETAMINOPHEN 10; 325 MG/1; MG/1
TABLET ORAL
Qty: 30 TABLET | Refills: 0 | Status: CANCELLED | OUTPATIENT
Start: 2022-01-09

## 2022-01-10 RX ORDER — HYDROCODONE BITARTRATE AND ACETAMINOPHEN 10; 325 MG/1; MG/1
1 TABLET ORAL 4 TIMES DAILY PRN
Qty: 120 TABLET | Refills: 0 | Status: SHIPPED | OUTPATIENT
Start: 2022-01-11 | End: 2022-02-08

## 2022-01-12 ENCOUNTER — MYC MEDICAL ADVICE (OUTPATIENT)
Dept: FAMILY MEDICINE | Facility: CLINIC | Age: 72
End: 2022-01-12
Payer: MEDICARE

## 2022-01-12 NOTE — TELEPHONE ENCOUNTER
Patient is given info on testing and treatment for his COVID exposure via [employer].  Closing this encounter.  MELLY GarzonN, RN

## 2022-01-21 DIAGNOSIS — F41.9 ANXIETY: ICD-10-CM

## 2022-01-21 RX ORDER — ALPRAZOLAM 0.5 MG
TABLET ORAL
Qty: 60 TABLET | Refills: 0 | OUTPATIENT
Start: 2022-01-21

## 2022-01-21 NOTE — TELEPHONE ENCOUNTER
Routing to covering provider to review. PCP out of clinic.  Routing refill request to provider for review/approval because:  Drug not on the [facility] refill protocol   Riana Garsia, RN, BSN

## 2022-01-25 RX ORDER — ALPRAZOLAM 0.5 MG
0.5 TABLET ORAL 2 TIMES DAILY PRN
Qty: 60 TABLET | Refills: 0 | Status: SHIPPED | OUTPATIENT
Start: 2022-01-25 | End: 2022-02-20

## 2022-01-25 NOTE — TELEPHONE ENCOUNTER
Please fill script if okay. Patient last script was filled on 12/27/2021 now within the 72 hour window.   Lauren Finley MA

## 2022-01-28 ENCOUNTER — LAB (OUTPATIENT)
Dept: LAB | Facility: CLINIC | Age: 72
End: 2022-01-28
Payer: MEDICARE

## 2022-01-28 DIAGNOSIS — C61 MALIGNANT NEOPLASM OF PROSTATE (H): ICD-10-CM

## 2022-01-28 LAB — PSA SERPL-MCNC: 0.09 UG/L (ref 0–4)

## 2022-01-28 PROCEDURE — 36415 COLL VENOUS BLD VENIPUNCTURE: CPT

## 2022-01-28 PROCEDURE — 84153 ASSAY OF PSA TOTAL: CPT

## 2022-02-01 DIAGNOSIS — I10 BENIGN ESSENTIAL HYPERTENSION: ICD-10-CM

## 2022-02-01 RX ORDER — METOPROLOL SUCCINATE 50 MG/1
50 TABLET, EXTENDED RELEASE ORAL DAILY
Qty: 90 TABLET | Refills: 2 | Status: SHIPPED | OUTPATIENT
Start: 2022-02-01 | End: 2022-11-01

## 2022-02-02 ENCOUNTER — TELEPHONE (OUTPATIENT)
Dept: UROLOGY | Facility: CLINIC | Age: 72
End: 2022-02-02

## 2022-02-02 ENCOUNTER — OFFICE VISIT (OUTPATIENT)
Dept: UROLOGY | Facility: CLINIC | Age: 72
End: 2022-02-02
Payer: MEDICARE

## 2022-02-02 ENCOUNTER — PREP FOR PROCEDURE (OUTPATIENT)
Dept: UROLOGY | Facility: CLINIC | Age: 72
End: 2022-02-02

## 2022-02-02 VITALS
OXYGEN SATURATION: 97 % | DIASTOLIC BLOOD PRESSURE: 63 MMHG | BODY MASS INDEX: 30.5 KG/M2 | WEIGHT: 200.6 LBS | SYSTOLIC BLOOD PRESSURE: 99 MMHG | HEART RATE: 74 BPM | TEMPERATURE: 97.6 F

## 2022-02-02 DIAGNOSIS — N21.0 BLADDER STONE: ICD-10-CM

## 2022-02-02 DIAGNOSIS — N21.0 BLADDER STONES: Primary | ICD-10-CM

## 2022-02-02 DIAGNOSIS — C61 MALIGNANT NEOPLASM OF PROSTATE (H): Primary | ICD-10-CM

## 2022-02-02 PROCEDURE — 51798 US URINE CAPACITY MEASURE: CPT | Performed by: UROLOGY

## 2022-02-02 PROCEDURE — 99214 OFFICE O/P EST MOD 30 MIN: CPT | Mod: 25 | Performed by: UROLOGY

## 2022-02-02 RX ORDER — CEFAZOLIN SODIUM 2 G/100ML
2 INJECTION, SOLUTION INTRAVENOUS SEE ADMIN INSTRUCTIONS
Status: CANCELLED | OUTPATIENT
Start: 2022-02-02

## 2022-02-02 RX ORDER — CEFAZOLIN SODIUM 2 G/100ML
2 INJECTION, SOLUTION INTRAVENOUS
Status: CANCELLED | OUTPATIENT
Start: 2022-02-02

## 2022-02-02 ASSESSMENT — PAIN SCALES - GENERAL: PAINLEVEL: NO PAIN (0)

## 2022-02-02 NOTE — TELEPHONE ENCOUNTER
Reason for Call:  Other call back    Detailed comments: pt scheduled 6 month follow-up office visit and labs- was wondering if Rogers also wanted to have x-ray done beforehand as when he used to see Felipe he had them done before visits.     Phone Number Patient can be reached at: Home number on file [phone] (home)    Best Time:any    Can we leave a detailed message on this number? YES    Call taken on 2/2/2022 at 2:46 PM by Maura Song

## 2022-02-02 NOTE — PROGRESS NOTES
Chief Complaint   Patient presents with     RECHECK     PSA       Quan Murphy is a 71 year old male who presents today for follow up of   Chief Complaint   Patient presents with     RECHECK     PSA    f/u for prostate cancer/bladder stone.  He is s/p RRP in the past.  He was receiving TRT with psa rising.  Recently his TRT has been discontinued, his psa has decreased to 0.09.  He has intermittent obstructive flow with his bladder stones.    Current Outpatient Medications   Medication Sig Dispense Refill     ALPRAZolam (XANAX) 0.5 MG tablet Take 1 tablet (0.5 mg) by mouth 2 times daily as needed for anxiety 60 tablet 0     ALPRAZolam (XANAX) 0.5 MG tablet Take 1 tablet (0.5 mg) by mouth 2 times daily as needed for anxiety 60 tablet 0     amoxicillin-clavulanate (AUGMENTIN-ES) 600-42.9 MG/5ML suspension Take 5 mLs (600 mg) by mouth 2 times daily (Patient not taking: Reported on 9/17/2021) 200 mL 0     ASPIRIN ADULT LOW STRENGTH 81 MG EC tablet TAKE ONE TABLET BY MOUTH ONCE DAILY (Patient not taking: Reported on 9/17/2021) 90 tablet 1     cetirizine (ZYRTEC) 10 MG tablet Take 10 mg by mouth       cevimeline (EVOXAC) 30 MG capsule Take 1 capsule (30 mg) by mouth 3 times daily 120 capsule 11     citalopram (CELEXA) 40 MG tablet TAKE ONE TABLET BY MOUTH ONCE DAILY 90 tablet 0     dronabinol (MARINOL) 2.5 MG capsule Take 1 capsule (2.5 mg) by mouth 2 times daily (before meals) 28 capsule 3     fluconazole (DIFLUCAN) 200 MG tablet Take 1 tablet (200 mg) by mouth daily (Patient not taking: Reported on 11/3/2021) 7 tablet 0     HYDROcodone-acetaminophen (NORCO)  MG per tablet Take 1 tablet by mouth 4 times daily as needed for severe pain 120 tablet 0     hydrOXYzine (VISTARIL) 25 MG capsule Take 1 capsule (25 mg) by mouth 4 times daily as needed for anxiety 120 capsule 4     ibuprofen (ADVIL/MOTRIN) 200 MG tablet Take 200 mg by mouth       levothyroxine (SYNTHROID/LEVOTHROID) 137 MCG tablet Take 1 tablet (137 mcg)  by mouth daily 30 tablet 11     metoprolol succinate ER (TOPROL-XL) 50 MG 24 hr tablet Take 1 tablet (50 mg) by mouth daily 90 tablet 2     naloxone (NARCAN) 4 MG/0.1ML nasal spray Spray 1 spray (4 mg) into one nostril alternating nostrils as needed for opioid reversal every 2-3 minutes until assistance arrives (Patient not taking: Reported on 9/30/2021) 0.2 mL      nitroGLYcerin (NITROSTAT) 0.4 MG sublingual tablet For chest pain place 1 tablet under the tongue every 5 minutes for 3 doses. If symptoms persist 5 minutes after 1st dose call 911. (Patient not taking: Reported on 9/30/2021) 25 tablet 0     nystatin (MYCOSTATIN) 691359 UNIT/ML suspension Take 5 mLs (500,000 Units) by mouth 4 times daily (Patient not taking: Reported on 9/30/2021) 240 mL 0     omeprazole (PRILOSEC) 40 MG DR capsule Take 1 capsule (40 mg) by mouth daily (Patient not taking: Reported on 9/30/2021) 90 capsule 3     pilocarpine (SALAGEN) 5 MG tablet Take 1 tablet (5 mg) by mouth 3 times daily 90 tablet 3     prochlorperazine (COMPAZINE) 5 MG tablet TAKE 1 TABLET BY MOUTH EVERY 6 HOURS AS NEEDED FOR NAUSEA OR VOMITING       rosuvastatin (CRESTOR) 40 MG tablet Take 1 tablet (40 mg) by mouth daily 90 tablet 3     testosterone (ANDROGEL 1.62 % PUMP) 20.25 MG/ACT gel testosterone 20.25 mg/1.25 gram (1.62 %) transdermal gel pump       zolpidem ER (AMBIEN CR) 12.5 MG CR tablet Take 1 tablet (12.5 mg) by mouth nightly as needed for sleep 30 tablet 3     Allergies   Allergen Reactions     Animal Dander      Azithromycin Nausea and Vomiting     Dust Mites      Pollen Extract      Smoke.       Past Medical History:   Diagnosis Date     Allergic rhinitis      Allergy, unspecified not elsewhere classified     Seasonal allergies, pollen, dust, smoke and animals     Antiplatelet or antithrombotic long-term use      Anxiety      Arthritis      Chest pain      Chronic sinusitis      Coronary atherosclerosis of unspecified type of vessel, native or graft      Coronary artery disease     Depressive disorder 1995     Gastroesophageal reflux disease 2020    Cleared with medication     Head injury 1954     Hiatal hernia 2015    Right Side     History of blood transfusion 12/15/2004    Prostate Surgery - My own blood     Hyperlipidemia      Hypertension      Inguinal hernia      Kidney problem 10/08/2017    Lithotripsy     Kidney stones      Malignant neoplasm of prostate (H)     Prostate cancer     Prostate cancer (H)      Past Surgical History:   Procedure Laterality Date     ARTHRODESIS FOOT  7/23/2013    Procedure: ARTHRODESIS FOOT;  Great Toe Arthrodesis Left Foot;  Surgeon: Ash Gonzalez DPM;  Location: [facility]     ARTHRODESIS FOOT  6/10/2014    Procedure: ARTHRODESIS FOOT;  Surgeon: Ash Gonzalez DPM;  Location: [facility]     BIOPSY  10/1/2004    dermatologist biopsies     COLONOSCOPY  10/7/2013    Procedure: COLONOSCOPY;  Colonoscopy;  Surgeon: Mike Fallon MD;  Location: [facility]     ESOPHAGOSCOPY, GASTROSCOPY, DUODENOSCOPY (EGD), COMBINED N/A 2/12/2020    Procedure: ESOPHAGOGASTRODUODENOSCOPY (EGD);  Surgeon: Sam Escobar MD;  Location: [facility]     EXTRACORPOREAL SHOCK WAVE LITHOTRIPSY (ESWL) Bilateral 10/18/2017    Procedure: EXTRACORPOREAL SHOCK WAVE LITHOTRIPSY (ESWL);  BILATERAL EXTRACORPOREAL SHOCKWAVE LITHOTRIPSY ;  Surgeon: Meir Torres MD;  Location: [facility]     HC CORRECT BUNION,SIMPLE  08/11/2005    x3     HC REMV TOE BENIGN BONE LESN  08/11/2005     HEAD & NECK SURGERY  1954    From a fall     HERNIORRHAPHY INGUINAL  7/3/2013    Procedure: HERNIORRHAPHY INGUINAL;  Open Repair Inguinal hernia Right with mesh ;  Surgeon: Sam Escobar MD;  Location: [facility]     IR GASTROSTOMY TUBE PERCUTANEOUS PLCMNT  6/7/2021     MOHS MICROGRAPHIC PROCEDURE  08/23/11    ear and chin-[facility]     OPEN REDUCTION INTERNAL FIXATION WRIST Right 7/18/2017    Procedure: OPEN REDUCTION INTERNAL FIXATION WRIST;  Right distal radius open reduction  and internal fixation;  Surgeon: Pedro Blanca DO;  Location: [facility]     RECONSTRUCT FOREFOOT WITH METATARSOPHALANGEAL (MTP) FUSION  6/10/2014    Procedure: RECONSTRUCT FOREFOOT WITH METATARSOPHALANGEAL (MTP) FUSION;  Surgeon: Ash Gonzalez DPM;  Location: [facility]     STENT, CORONARY, DEMI       SURGICAL HISTORY OF -   1999/1974    lt knee     SURGICAL HISTORY OF -   10/2004    lithotripsy     SURGICAL HISTORY OF -   11/05    angiogram with stent     VASCULAR SURGERY  11/17/2005    Puncture of iliac artery during and angiogram     [facility] LAPAROSCOPY, SURGICAL PROSTATECTOMY, RETROPUBIC RADICAL, W/NERVE SPARING  11/30/2004    With full bilateral pelvic lymphadenectomy.  [facility].     [facility] TOTAL KNEE ARTHROPLASTY  05/01/08    Left knee     Family History   Problem Relation Age of Onset     Hypertension Father         Lived to age 87     Connective Tissue Disorder Mother         LUPUS     Heart Disease Mother         Valve replacement     Anxiety Disorder Mother         Lived to age 84     Dementia Mother         Nursing Home (lived to age 86)     Social History     Socioeconomic History     Marital status:      Spouse name: Alessandra     Number of children: 2     Years of education: None     Highest education level: None   Occupational History     Occupation: Retired   Tobacco Use     Smoking status: Never Smoker     Smokeless tobacco: Never Used   Substance and Sexual Activity     Alcohol use: Yes     Alcohol/week: 8.3 standard drinks     Types: 10 Cans of beer per week     Comment: occasional     Drug use: No     Sexual activity: Yes     Partners: Female     Birth control/protection: Female Surgical   Other Topics Concern      Service Not Asked     Blood Transfusions Not Asked     Caffeine Concern Not Asked     Occupational Exposure Not Asked     Hobby Hazards Not Asked     Sleep Concern Not Asked     Stress Concern Not Asked     Weight Concern Not Asked     Special Diet Not Asked     Back Care Not  Asked     Exercise Not Asked     Bike Helmet Not Asked     Seat Belt Not Asked     Self-Exams Not Asked     Parent/sibling w/ CABG, MI or angioplasty before 65F 55M? No   Social History Narrative     None     Social Determinants of Health     Financial Resource Strain: Not on file   Food Insecurity: Not on file   Transportation Needs: Not on file   Physical Activity: Not on file   Stress: Not on file   Social Connections: Not on file   Intimate Partner Violence: Not on file   Housing Stability: Not on file       REVIEW OF SYSTEMS  =================  C: NEGATIVE for fever, chills, change in weight  I: NEGATIVE for worrisome rashes, moles or lesions  E/M: NEGATIVE for ear, mouth and throat problems  R: NEGATIVE for significant cough or SHORTNESS OF BREATH  CV:  NEGATIVE for chest pain, palpitations or peripheral edema  GI: NEGATIVE for nausea, abdominal pain, heartburn, or change in bowel habits  NEURO: NEGATIVE numbness/weakness  : see HPI  PSYCH: NEGATIVE depression/anxiety  LYmph: no new enlarged lymph nodes  Ortho: no new trauma/movements    Physical Exam:  BP 99/63 (BP Location: Right leg, Patient Position: Sitting, Cuff Size: Adult Regular)   Pulse 74   Temp 97.6  F (36.4  C) (Temporal)   Wt 91 kg (200 lb 9.6 oz)   SpO2 97%   BMI 30.50 kg/m     Patient is pleasant, in no acute distress, good general condition.  Lung: no evidence of respiratory distress    Abdomen: Soft, nondistended, non tender. No masses. No rebound or guarding.   Exam: no cva tenderness.  Bladder scan 150 ml.  Skin: Warm and dry.  No redness.  Psych: normal mood and affect  Neuro: alert and oriented  Musculaskeletal: moving all extremities    Assessment/Plan:   (C61) Malignant neoplasm of prostate (H)  (primary encounter diagnosis)  Comment:    Plan: MEASURE POST-VOID RESIDUAL URINE/BLADDER         CAPACITY, US NON-IMAGING, PSA tumor marker         Recheck psa in six months        Stop TRT indefinitely    (N21.0) Bladder  stone  Comment:    Plan: schedule for cysto and removal.

## 2022-02-02 NOTE — PROGRESS NOTES
Bladder Scan performed. 150 maximum residual urine detected after 3 scans. MD informed   Sona Alonzo RN on 2/2/2022 at 1:09 PM

## 2022-02-02 NOTE — TELEPHONE ENCOUNTER
Type of surgery: CYSTOSCOPY and bladder stone removal    Location of surgery: [facility]  Date and time of surgery: 2/16  Surgeon: Sue  Pre-Op Appt Date: 2/10  Post-Op Appt Date: 2/12   Packet sent out: Not Applicable  Pre-cert/Authorization completed:  Not Applicable  Date: na

## 2022-02-03 DIAGNOSIS — Z11.59 ENCOUNTER FOR SCREENING FOR OTHER VIRAL DISEASES: Primary | ICD-10-CM

## 2022-02-03 NOTE — TELEPHONE ENCOUNTER
Called and spoke with patient's wife who stated she will give the message to the patient that an x-ray is not needed prior to next appointment with Dr. Rogers.    Anita VERMA RN Urology 2/3/2022 10:00 AM

## 2022-02-04 ENCOUNTER — THERAPY VISIT (OUTPATIENT)
Dept: SPEECH THERAPY | Facility: CLINIC | Age: 72
End: 2022-02-04
Payer: MEDICARE

## 2022-02-04 ENCOUNTER — OFFICE VISIT (OUTPATIENT)
Dept: OTOLARYNGOLOGY | Facility: CLINIC | Age: 72
End: 2022-02-04
Payer: MEDICARE

## 2022-02-04 VITALS
WEIGHT: 197 LBS | OXYGEN SATURATION: 98 % | BODY MASS INDEX: 29.86 KG/M2 | SYSTOLIC BLOOD PRESSURE: 101 MMHG | DIASTOLIC BLOOD PRESSURE: 65 MMHG | HEART RATE: 75 BPM | HEIGHT: 68 IN | TEMPERATURE: 96.5 F

## 2022-02-04 DIAGNOSIS — C09.9 SQUAMOUS CELL CARCINOMA OF TONSIL (H): Primary | ICD-10-CM

## 2022-02-04 DIAGNOSIS — C09.9 SQUAMOUS CELL CARCINOMA OF TONSIL (H): ICD-10-CM

## 2022-02-04 DIAGNOSIS — R13.12 OROPHARYNGEAL DYSPHAGIA: Primary | ICD-10-CM

## 2022-02-04 PROCEDURE — 31575 DIAGNOSTIC LARYNGOSCOPY: CPT | Performed by: OTOLARYNGOLOGY

## 2022-02-04 PROCEDURE — 99213 OFFICE O/P EST LOW 20 MIN: CPT | Mod: 25 | Performed by: RADIOLOGY

## 2022-02-04 PROCEDURE — 92526 ORAL FUNCTION THERAPY: CPT | Mod: GN | Performed by: SPEECH-LANGUAGE PATHOLOGIST

## 2022-02-04 PROCEDURE — 99214 OFFICE O/P EST MOD 30 MIN: CPT | Mod: 25 | Performed by: OTOLARYNGOLOGY

## 2022-02-04 ASSESSMENT — PAIN SCALES - GENERAL: PAINLEVEL: NO PAIN (0)

## 2022-02-04 ASSESSMENT — MIFFLIN-ST. JEOR: SCORE: 1628.09

## 2022-02-04 NOTE — PROGRESS NOTES
Dear Dr. Christensen:    I had the pleasure of seeing Quan Murphy in follow-up today at the [facility].     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 The patient has a history of foreign body sensation on the left side starting a few months ago. He tried gargling and using a neti pot with no improvement. He also developed left neck discomfort which radiates into his head. He saw primary care on 3/4/2021. He was treated with a course of antibiotics with no improvement. He saw seen by Dr Christensen on 3/22/2021. At that time he had an enlarged left tonsil. A biopsy was performed at that time which was consistent with a p16+ SCC. He had a CT scan on 3/23/2021 which showed a 2.9 x 2.7 x 3.5 cm left tonsil cancer, involving the soft palate, left level II and III lymphadenopathy. He had a PET scan performed today which showed the primary tumor in the tonsil and soft palate, left-sided lymphadenopathy with SHAYNE present (3.4 x 2.0 cm, 1.8 x 1.8 cm), right-sided node (1.4 x 0.9 cm) with FDG activity, no distant disease.    He was treated with definitive chemoradiation from 4/28/2021 to 6/12/2021. He had a total of 6996 cGy under the care of Dr Robbins. He had weekly cisplatin for a total of 5 cycles under the care of Dr Schrader. He had a difficult time with pain control during his treatment secondary to his chronic pain medication use and pain medications were managed by palliative care. He was hospitalized from 6/2/2021 to 6/9/2021 for failure to thrive. He had reactive PEG tube placement on 6/7/2021 due to inability to maintain adequate nutrition and intolerance of the idea of an NG tube.  His PET scan was negative in September 2021. Dr Christensen performed a myringotomy in November 2021 for his ear issues.       Interval history:  He comes in today for follow-up. He was last seen in November 2021. He had a visit in February  for his elevated PSA, scheduled for cystoscopy with bladder stone removal.  He says that his ear has improved since his last visit.  He avoided tube placement.  He did have Covid a couple of weeks ago and noticed that he did have some increased plugging of his ear.  He says his ear will open up when he is swallowing.  He is having no pain with swallowing.  He does have to have fluids when he eats meats.  He is doing a few shakes per day to supplement.  He says his taste is about 50% of normal.  His weight is stable.  He is noticing that his hearing is worse when he has the plugging episodes of the ear.  He feels like his energy is less, not going to the gym due to the cold.  He is having neck stiffness after exercising.  He is trying to stretch his neck.  He is less regular about doing his swallowing exercises.  He wakes up feeling like his throat is clogged.  He is not using humidifier.      MEDICATIONS:     Current Outpatient Medications   Medication Sig Dispense Refill     ALPRAZolam (XANAX) 0.5 MG tablet Take 1 tablet (0.5 mg) by mouth 2 times daily as needed for anxiety 60 tablet 0     ALPRAZolam (XANAX) 0.5 MG tablet Take 1 tablet (0.5 mg) by mouth 2 times daily as needed for anxiety 60 tablet 0     cetirizine (ZYRTEC) 10 MG tablet Take 10 mg by mouth       cevimeline (EVOXAC) 30 MG capsule Take 1 capsule (30 mg) by mouth 3 times daily 120 capsule 11     citalopram (CELEXA) 40 MG tablet TAKE ONE TABLET BY MOUTH ONCE DAILY 90 tablet 0     dronabinol (MARINOL) 2.5 MG capsule Take 1 capsule (2.5 mg) by mouth 2 times daily (before meals) 28 capsule 3     HYDROcodone-acetaminophen (NORCO)  MG per tablet Take 1 tablet by mouth 4 times daily as needed for severe pain 120 tablet 0     hydrOXYzine (VISTARIL) 25 MG capsule Take 1 capsule (25 mg) by mouth 4 times daily as needed for anxiety 120 capsule 4     metoprolol succinate ER (TOPROL-XL) 50 MG 24 hr tablet Take 1 tablet (50 mg) by mouth daily 90 tablet 2      pilocarpine (SALAGEN) 5 MG tablet Take 1 tablet (5 mg) by mouth 3 times daily 90 tablet 3     rosuvastatin (CRESTOR) 40 MG tablet Take 1 tablet (40 mg) by mouth daily 90 tablet 3     zolpidem ER (AMBIEN CR) 12.5 MG CR tablet Take 1 tablet (12.5 mg) by mouth nightly as needed for sleep 30 tablet 3     naloxone (NARCAN) 4 MG/0.1ML nasal spray Spray 1 spray (4 mg) into one nostril alternating nostrils as needed for opioid reversal every 2-3 minutes until assistance arrives (Patient not taking: Reported on 9/30/2021) 0.2 mL      nitroGLYcerin (NITROSTAT) 0.4 MG sublingual tablet For chest pain place 1 tablet under the tongue every 5 minutes for 3 doses. If symptoms persist 5 minutes after 1st dose call 911. (Patient not taking: Reported on 9/30/2021) 25 tablet 0     omeprazole (PRILOSEC) 40 MG DR capsule Take 1 capsule (40 mg) by mouth daily (Patient not taking: Reported on 9/30/2021) 90 capsule 3     prochlorperazine (COMPAZINE) 5 MG tablet TAKE 1 TABLET BY MOUTH EVERY 6 HOURS AS NEEDED FOR NAUSEA OR VOMITING       testosterone (ANDROGEL 1.62 % PUMP) 20.25 MG/ACT gel testosterone 20.25 mg/1.25 gram (1.62 %) transdermal gel pump         ALLERGIES:    Allergies   Allergen Reactions     Animal Dander      Azithromycin Nausea and Vomiting     Dust Mites      Pollen Extract      Smoke.        HABITS/SOCIAL HISTORY:   Nonsmoker. Chewing tobacco use occasionally when fishing. . Drink 2-3 beers per day.   Lives with wife (nurse).   Retired .     Social History     Socioeconomic History     Marital status:      Spouse name: Alessandra     Number of children: 2     Years of education: Not on file     Highest education level: Not on file   Occupational History     Occupation: Retired   Tobacco Use     Smoking status: Never Smoker     Smokeless tobacco: Never Used   Substance and Sexual Activity     Alcohol use: Yes     Alcohol/week: 8.3 standard drinks     Types: 10 Cans of beer per week     Comment:  occasional     Drug use: No     Sexual activity: Yes     Partners: Female     Birth control/protection: Female Surgical   Other Topics Concern      Service Not Asked     Blood Transfusions Not Asked     Caffeine Concern Not Asked     Occupational Exposure Not Asked     Hobby Hazards Not Asked     Sleep Concern Not Asked     Stress Concern Not Asked     Weight Concern Not Asked     Special Diet Not Asked     Back Care Not Asked     Exercise Not Asked     Bike Helmet Not Asked     Seat Belt Not Asked     Self-Exams Not Asked     Parent/sibling w/ CABG, MI or angioplasty before 65F 55M? No   Social History Narrative     Not on file     Social Determinants of Health     Financial Resource Strain: Not on file   Food Insecurity: Not on file   Transportation Needs: Not on file   Physical Activity: Not on file   Stress: Not on file   Social Connections: Not on file   Intimate Partner Violence: Not on file   Housing Stability: Not on file       PAST MEDICAL HISTORY:   Past Medical History:   Diagnosis Date     Allergic rhinitis      Allergy, unspecified not elsewhere classified     Seasonal allergies, pollen, dust, smoke and animals     Antiplatelet or antithrombotic long-term use      Anxiety      Arthritis      Chest pain      Chronic sinusitis      Coronary atherosclerosis of unspecified type of vessel, native or graft     Coronary artery disease     Depressive disorder 1995     Gastroesophageal reflux disease 2020    Cleared with medication     Head injury 1954     Hiatal hernia 2015    Right Side     History of blood transfusion 12/15/2004    Prostate Surgery - My own blood     Hyperlipidemia      Hypertension      Inguinal hernia      Kidney problem 10/08/2017    Lithotripsy     Kidney stones      Malignant neoplasm of prostate ([facility])     Prostate cancer     Prostate cancer ([facility])         PAST SURGICAL HISTORY:   Past Surgical History:   Procedure Laterality Date     ARTHRODESIS FOOT  7/23/2013    Procedure:  ARTHRODESIS FOOT;  Great Toe Arthrodesis Left Foot;  Surgeon: Ash Gonzalez DPM;  Location: [facility]     ARTHRODESIS FOOT  6/10/2014    Procedure: ARTHRODESIS FOOT;  Surgeon: Ash Gonzalez DPM;  Location: [facility]     BIOPSY  10/1/2004    dermatologist biopsies     COLONOSCOPY  10/7/2013    Procedure: COLONOSCOPY;  Colonoscopy;  Surgeon: Mike Fallon MD;  Location: [facility]     ESOPHAGOSCOPY, GASTROSCOPY, DUODENOSCOPY (EGD), COMBINED N/A 2/12/2020    Procedure: ESOPHAGOGASTRODUODENOSCOPY (EGD);  Surgeon: Sam Escobar MD;  Location: [facility]     EXTRACORPOREAL SHOCK WAVE LITHOTRIPSY (ESWL) Bilateral 10/18/2017    Procedure: EXTRACORPOREAL SHOCK WAVE LITHOTRIPSY (ESWL);  BILATERAL EXTRACORPOREAL SHOCKWAVE LITHOTRIPSY ;  Surgeon: Meir Torres MD;  Location: [facility] OR     HC CORRECT BUNION,SIMPLE  08/11/2005    x3     HC REMV TOE BENIGN BONE LESN  08/11/2005     HEAD & NECK SURGERY  1954    From a fall     HERNIORRHAPHY INGUINAL  7/3/2013    Procedure: HERNIORRHAPHY INGUINAL;  Open Repair Inguinal hernia Right with mesh ;  Surgeon: Sam Escobar MD;  Location: [facility]     IR GASTROSTOMY TUBE PERCUTANEOUS PLCMNT  6/7/2021     MOHS MICROGRAPHIC PROCEDURE  08/23/11    [facility]     OPEN REDUCTION INTERNAL FIXATION WRIST Right 7/18/2017    Procedure: OPEN REDUCTION INTERNAL FIXATION WRIST;  Right distal radius open reduction and internal fixation;  Surgeon: Pedro Blanca DO;  Location: [facility]     RECONSTRUCT FOREFOOT WITH METATARSOPHALANGEAL (MTP) FUSION  6/10/2014    Procedure: RECONSTRUCT FOREFOOT WITH METATARSOPHALANGEAL (MTP) FUSION;  Surgeon: Ash Gonzalez DPM;  Location: [facility]     STENT, CORONARY, DEMI       SURGICAL HISTORY OF -   1999/1974    lt knee     SURGICAL HISTORY OF -   10/2004    lithotripsy     SURGICAL HISTORY OF -   11/05    angiogram with stent     VASCULAR SURGERY  11/17/2005    Puncture of iliac artery during and angiogram     [facility]  "LAPAROSCOPY, SURGICAL PROSTATECTOMY, RETROPUBIC RADICAL, W/NERVE SPARING  11/30/2004    With full bilateral pelvic lymphadenectomy.  F-[facility].     ZZC TOTAL KNEE ARTHROPLASTY  05/01/08    Left knee       FAMILY HISTORY:    Family History   Problem Relation Age of Onset     Hypertension Father         Lived to age 87     Connective Tissue Disorder Mother         LUPUS     Heart Disease Mother         Valve replacement     Anxiety Disorder Mother         Lived to age 84     Dementia Mother         Nursing Home (lived to age 86)       REVIEW OF SYSTEMS:  12 point ROS was negative other than the symptoms noted above in the HPI.  Patient Supplied Answers to Review of Systems  [facility] ENT ROS 1/31/2022   Constitutional Unexplained fever or night sweats   Neurology -   Psychology -   Ears, Nose, Throat Hearing loss, Nasal congestion or drainage   Allergy/Immunology Allergies or hay fever   Hematologic -         PHYSICAL EXAMINATION:   /65 (BP Location: Right arm, Patient Position: Sitting, Cuff Size: Adult Regular)   Pulse 75   Temp (!) 96.5  F (35.8  C) (Temporal)   Ht 1.727 m (5' 8\")   Wt 89.4 kg (197 lb)   SpO2 98%   BMI 29.95 kg/m     Appearance:   normal; NAD, age-appropriate appearance, well-developed, normal habitus   Communication:   normal; communicates verbally, normal voice quality, slightly hoarse/rough quality   Head/Face:   inspection -  Normal; no scars or visible lesions   Salivary glands -  Normal size, no tenderness, swelling, or palpable masses   Facial strength -  Normal and symmetric    Skin:  normal, no rash   Ears:  auricle (AD) -  normal  EAC (AD) -  normal  TM (AD) -  Normal, no effusion  auricle (AS) -  normal  EAC (AS) -  normal  TM (AS) -  Small serous effusion  Normal clinical speech reception   Nose:  Ext. inspection -  Normal  Internal Inspection -  Normal mucosa, septum, and turbinates   Nasopharynx:  normal mucosa, no masses   Oral Cavity:  lips -  Normal mucosa, oral competence, and " stoma size   Age-appropriate dentition, healthy gingival mucosa  Mildly dry mucus membranes   Hard palate, buccal, floor of mouth mucosa with xerostomia   Tongue - normal movement, no masses or mucosal lesions   Oropharynx:  There are radiation changes to the mucosa throughout with scattered telangiectasias  Traction of the mucosa of the palate and tonsillar fossa, left greater than right  There are no secretions in the vallecula  Palpation is limited of the oropharynx due to gag reflex, no palpable masses, no mucosal lesions  Base of tongue - no masses or mucosal lesions  No residual disease   Hypopharynx:  Normal pyriform sinus    No pooled secretions    Larynx:  Epiglottis, false vocal cords, true vocal cords normal in appearance, bilaterally mobile cords   The left arytenoid and AE fold have mild radiation edema that is not obstructive    Neck: No palpable masses  Moderate submental lymphedema  Mild fibrosis bilateral neck   Lymphatic:  No palpable masses   Cardiovascular:  warm, pink, well-perfused extremities without swelling, tenderness, or edema   Respiratory:  Normal respiratory effort, no stridor   Neuro/Psych.:  mood/affect -  normal  mental status -  normal         PROCEDURES:   Flexible fiberoptic laryngoscopy: Scope exam was indicated due to tonsil cancer. Verbal consent was obtained. The nasal cavity was prepped with an aerosolized solution of topical anesthetic and vasoconstrictive agent. The scope was passed through the anterior nasal cavity and advanced. Inspection of the nasopharynx revealed no gross abnormality.  There is no obvious residual mass in the left tonsil/oropharynx.  The epiglottis has very minimal thickening.  There is radiation edema of the left AE fold and left arytenoid.  This is not obstructive.  The vocal cords are mobile bilaterally.  The piriform sinuses are clear. The airway is patent. Procedure tolerated well with no immediate complications noted.          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 He completed definitive chemoradiation on 6/12/2021.    He continues to recover from his treatment.  He was recommended to use a humidifier to try and help with some of his thick phlegm.  He can also use a salt and soda rinses.  He does have a serous effusion on the left but this is improved and is less symptomatic.    He was seen by SLP team today who reinforced continuing the exercises.     He is doing his lymphedema therapy at home.  We discussed the importance of doing this to help with the internal and external lymphedema.    His TSH was normal on recheck in November 2021. He is on synthroid. Will recheck in May 2022.    We will plan on repeating his imaging in March 2022.    I would like to see him back in 2 months with imaging.  He will return to [facility] clinic in 4 months.    Thank you very much for the opportunity to participate in the care of your patient.      Ligia Fang MD  Otolaryngology- Head & Neck Surgery      This note was dictated with voice recognition software and then edited. Please excuse any unintentional errors.         CC:  Ahmet Robbins MD  Department of Radiation Oncology  [facility]      Too Christensen MD  [address] [place] [address]      Shanthi Schrader MD  Department of Medical Oncology  [facility]

## 2022-02-04 NOTE — LETTER
2/4/2022       RE: Quan Murphy  [address]  [place] [address]     Dear Colleague,    Thank you for referring your patient, Quan Murphy, to the [facility] [place] [facility]. Please see a copy of my visit note below.    Dear Dr. Christensen:    I had the pleasure of seeing Quan Murphy in follow-up today at the [facility].     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 The patient has a history of foreign body sensation on the left side starting a few months ago. He tried gargling and using a neti pot with no improvement. He also developed left neck discomfort which radiates into his head. He saw primary care on 3/4/2021. He was treated with a course of antibiotics with no improvement. He saw seen by Dr Christensen on 3/22/2021. At that time he had an enlarged left tonsil. A biopsy was performed at that time which was consistent with a p16+ SCC. He had a CT scan on 3/23/2021 which showed a 2.9 x 2.7 x 3.5 cm left tonsil cancer, involving the soft palate, left level II and III lymphadenopathy. He had a PET scan performed today which showed the primary tumor in the tonsil and soft palate, left-sided lymphadenopathy with SHAYNE present (3.4 x 2.0 cm, 1.8 x 1.8 cm), right-sided node (1.4 x 0.9 cm) with FDG activity, no distant disease.    He was treated with definitive chemoradiation from 4/28/2021 to 6/12/2021. He had a total of 6996 cGy under the care of Dr Robbins. He had weekly cisplatin for a total of 5 cycles under the care of Dr Schrader. He had a difficult time with pain control during his treatment secondary to his chronic pain medication use and pain medications were managed by palliative care. He was hospitalized from 6/2/2021 to 6/9/2021 for failure to thrive. He had reactive PEG tube placement on 6/7/2021 due to  inability to maintain adequate nutrition and intolerance of the idea of an NG tube.  His PET scan was negative in September 2021. Dr Christensen performed a myringotomy in November 2021 for his ear issues.       Interval history:  He comes in today for follow-up. He was last seen in November 2021. He had a visit in February for his elevated PSA, scheduled for cystoscopy with bladder stone removal.  He says that his ear has improved since his last visit.  He avoided tube placement.  He did have Covid a couple of weeks ago and noticed that he did have some increased plugging of his ear.  He says his ear will open up when he is swallowing.  He is having no pain with swallowing.  He does have to have fluids when he eats meats.  He is doing a few shakes per day to supplement.  He says his taste is about 50% of normal.  His weight is stable.  He is noticing that his hearing is worse when he has the plugging episodes of the ear.  He feels like his energy is less, not going to the gym due to the cold.  He is having neck stiffness after exercising.  He is trying to stretch his neck.  He is less regular about doing his swallowing exercises.  He wakes up feeling like his throat is clogged.  He is not using humidifier.      MEDICATIONS:     Current Outpatient Medications   Medication Sig Dispense Refill     ALPRAZolam (XANAX) 0.5 MG tablet Take 1 tablet (0.5 mg) by mouth 2 times daily as needed for anxiety 60 tablet 0     ALPRAZolam (XANAX) 0.5 MG tablet Take 1 tablet (0.5 mg) by mouth 2 times daily as needed for anxiety 60 tablet 0     cetirizine (ZYRTEC) 10 MG tablet Take 10 mg by mouth       cevimeline (EVOXAC) 30 MG capsule Take 1 capsule (30 mg) by mouth 3 times daily 120 capsule 11     citalopram (CELEXA) 40 MG tablet TAKE ONE TABLET BY MOUTH ONCE DAILY 90 tablet 0     dronabinol (MARINOL) 2.5 MG capsule Take 1 capsule (2.5 mg) by mouth 2 times daily (before meals) 28 capsule 3     HYDROcodone-acetaminophen (NORCO)   MG per tablet Take 1 tablet by mouth 4 times daily as needed for severe pain 120 tablet 0     hydrOXYzine (VISTARIL) 25 MG capsule Take 1 capsule (25 mg) by mouth 4 times daily as needed for anxiety 120 capsule 4     metoprolol succinate ER (TOPROL-XL) 50 MG 24 hr tablet Take 1 tablet (50 mg) by mouth daily 90 tablet 2     pilocarpine (SALAGEN) 5 MG tablet Take 1 tablet (5 mg) by mouth 3 times daily 90 tablet 3     rosuvastatin (CRESTOR) 40 MG tablet Take 1 tablet (40 mg) by mouth daily 90 tablet 3     zolpidem ER (AMBIEN CR) 12.5 MG CR tablet Take 1 tablet (12.5 mg) by mouth nightly as needed for sleep 30 tablet 3     naloxone (NARCAN) 4 MG/0.1ML nasal spray Spray 1 spray (4 mg) into one nostril alternating nostrils as needed for opioid reversal every 2-3 minutes until assistance arrives (Patient not taking: Reported on 9/30/2021) 0.2 mL      nitroGLYcerin (NITROSTAT) 0.4 MG sublingual tablet For chest pain place 1 tablet under the tongue every 5 minutes for 3 doses. If symptoms persist 5 minutes after 1st dose call 911. (Patient not taking: Reported on 9/30/2021) 25 tablet 0     omeprazole (PRILOSEC) 40 MG DR capsule Take 1 capsule (40 mg) by mouth daily (Patient not taking: Reported on 9/30/2021) 90 capsule 3     prochlorperazine (COMPAZINE) 5 MG tablet TAKE 1 TABLET BY MOUTH EVERY 6 HOURS AS NEEDED FOR NAUSEA OR VOMITING       testosterone (ANDROGEL 1.62 % PUMP) 20.25 MG/ACT gel testosterone 20.25 mg/1.25 gram (1.62 %) transdermal gel pump         ALLERGIES:    Allergies   Allergen Reactions     Animal Dander      Azithromycin Nausea and Vomiting     Dust Mites      Pollen Extract      Smoke.        HABITS/SOCIAL HISTORY:   Nonsmoker. Chewing tobacco use occasionally when fishing. . Drink 2-3 beers per day.   Lives with wife (nurse).   Retired .     Social History     Socioeconomic History     Marital status:      Spouse name: Alessandra     Number of children: 2     Years of education:  Not on file     Highest education level: Not on file   Occupational History     Occupation: Retired   Tobacco Use     Smoking status: Never Smoker     Smokeless tobacco: Never Used   Substance and Sexual Activity     Alcohol use: Yes     Alcohol/week: 8.3 standard drinks     Types: 10 Cans of beer per week     Comment: occasional     Drug use: No     Sexual activity: Yes     Partners: Female     Birth control/protection: Female Surgical   Other Topics Concern      Service Not Asked     Blood Transfusions Not Asked     Caffeine Concern Not Asked     Occupational Exposure Not Asked     Hobby Hazards Not Asked     Sleep Concern Not Asked     Stress Concern Not Asked     Weight Concern Not Asked     Special Diet Not Asked     Back Care Not Asked     Exercise Not Asked     Bike Helmet Not Asked     Seat Belt Not Asked     Self-Exams Not Asked     Parent/sibling w/ CABG, MI or angioplasty before 65F 55M? No   Social History Narrative     Not on file     Social Determinants of Health     Financial Resource Strain: Not on file   Food Insecurity: Not on file   Transportation Needs: Not on file   Physical Activity: Not on file   Stress: Not on file   Social Connections: Not on file   Intimate Partner Violence: Not on file   Housing Stability: Not on file       PAST MEDICAL HISTORY:   Past Medical History:   Diagnosis Date     Allergic rhinitis      Allergy, unspecified not elsewhere classified     Seasonal allergies, pollen, dust, smoke and animals     Antiplatelet or antithrombotic long-term use      Anxiety      Arthritis      Chest pain      Chronic sinusitis      Coronary atherosclerosis of unspecified type of vessel, native or graft     Coronary artery disease     Depressive disorder 1995     Gastroesophageal reflux disease 2020    Cleared with medication     Head injury 1954     Hiatal hernia 2015    Right Side     History of blood transfusion 12/15/2004    Prostate Surgery - My own blood     Hyperlipidemia       Hypertension      Inguinal hernia      Kidney problem 10/08/2017    Lithotripsy     Kidney stones      Malignant neoplasm of prostate (H)     Prostate cancer     Prostate cancer (H)         PAST SURGICAL HISTORY:   Past Surgical History:   Procedure Laterality Date     ARTHRODESIS FOOT  7/23/2013    Procedure: ARTHRODESIS FOOT;  Great Toe Arthrodesis Left Foot;  Surgeon: Ash Gonzalez DPM;  Location: [facility] OR     ARTHRODESIS FOOT  6/10/2014    Procedure: ARTHRODESIS FOOT;  Surgeon: Ash Gonzalez DPM;  Location: [facility] OR     BIOPSY  10/1/2004    dermatologist biopsies     COLONOSCOPY  10/7/2013    Procedure: COLONOSCOPY;  Colonoscopy;  Surgeon: Mike Fallon MD;  Location: [facility]     ESOPHAGOSCOPY, GASTROSCOPY, DUODENOSCOPY (EGD), COMBINED N/A 2/12/2020    Procedure: ESOPHAGOGASTRODUODENOSCOPY (EGD);  Surgeon: Sam Escobar MD;  Location: [facility]     EXTRACORPOREAL SHOCK WAVE LITHOTRIPSY (ESWL) Bilateral 10/18/2017    Procedure: EXTRACORPOREAL SHOCK WAVE LITHOTRIPSY (ESWL);  BILATERAL EXTRACORPOREAL SHOCKWAVE LITHOTRIPSY ;  Surgeon: Meir Torres MD;  Location: [facility] OR     HC CORRECT BUNION,SIMPLE  08/11/2005    x3     HC REMV TOE BENIGN BONE LESN  08/11/2005     HEAD & NECK SURGERY  1954    From a fall     HERNIORRHAPHY INGUINAL  7/3/2013    Procedure: HERNIORRHAPHY INGUINAL;  Open Repair Inguinal hernia Right with mesh ;  Surgeon: Sam Escobar MD;  Location: [facility] OR     IR GASTROSTOMY TUBE PERCUTANEOUS PLCMNT  6/7/2021     MOHS MICROGRAPHIC PROCEDURE  08/23/11    [facility]     OPEN REDUCTION INTERNAL FIXATION WRIST Right 7/18/2017    Procedure: OPEN REDUCTION INTERNAL FIXATION WRIST;  Right distal radius open reduction and internal fixation;  Surgeon: Pedro Blanca DO;  Location: [facility]     RECONSTRUCT FOREFOOT WITH METATARSOPHALANGEAL (MTP) FUSION  6/10/2014    Procedure: RECONSTRUCT FOREFOOT WITH METATARSOPHALANGEAL (MTP) FUSION;  Surgeon: Ash Gonzalez  "LUIS Champion;  Location: [facility] OR     STENT, CORONARY, DEMI       SURGICAL HISTORY OF -   1999/1974    lt knee     SURGICAL HISTORY OF -   10/2004    lithotripsy     SURGICAL HISTORY OF -   11/05    angiogram with stent     VASCULAR SURGERY  11/17/2005    Puncture of iliac artery during and angiogram     [facility] LAPAROSCOPY, SURGICAL PROSTATECTOMY, RETROPUBIC RADICAL, W/NERVE SPARING  11/30/2004    With full bilateral pelvic lymphadenectomy.  [facility].     [facility] TOTAL KNEE ARTHROPLASTY  05/01/08    Left knee       FAMILY HISTORY:    Family History   Problem Relation Age of Onset     Hypertension Father         Lived to age 87     Connective Tissue Disorder Mother         LUPUS     Heart Disease Mother         Valve replacement     Anxiety Disorder Mother         Lived to age 84     Dementia Mother         Nursing Home (lived to age 86)       REVIEW OF SYSTEMS:  12 point ROS was negative other than the symptoms noted above in the HPI.  Patient Supplied Answers to Review of Systems  [facility] ENT ROS 1/31/2022   Constitutional Unexplained fever or night sweats   Neurology -   Psychology -   Ears, Nose, Throat Hearing loss, Nasal congestion or drainage   Allergy/Immunology Allergies or hay fever   Hematologic -         PHYSICAL EXAMINATION:   /65 (BP Location: Right arm, Patient Position: Sitting, Cuff Size: Adult Regular)   Pulse 75   Temp (!) 96.5  F (35.8  C) (Temporal)   Ht 1.727 m (5' 8\")   Wt 89.4 kg (197 lb)   SpO2 98%   BMI 29.95 kg/m     Appearance:   normal; NAD, age-appropriate appearance, well-developed, normal habitus   Communication:   normal; communicates verbally, normal voice quality, slightly hoarse/rough quality   Head/Face:   inspection -  Normal; no scars or visible lesions   Salivary glands -  Normal size, no tenderness, swelling, or palpable masses   Facial strength -  Normal and symmetric    Skin:  normal, no rash   Ears:  auricle (AD) -  normal  EAC (AD) -  normal  TM (AD) -  Normal, no " effusion  auricle (AS) -  normal  EAC (AS) -  normal  TM (AS) -  Small serous effusion  Normal clinical speech reception   Nose:  Ext. inspection -  Normal  Internal Inspection -  Normal mucosa, septum, and turbinates   Nasopharynx:  normal mucosa, no masses   Oral Cavity:  lips -  Normal mucosa, oral competence, and stoma size   Age-appropriate dentition, healthy gingival mucosa  Mildly dry mucus membranes   Hard palate, buccal, floor of mouth mucosa with xerostomia   Tongue - normal movement, no masses or mucosal lesions   Oropharynx:  There are radiation changes to the mucosa throughout with scattered telangiectasias  Traction of the mucosa of the palate and tonsillar fossa, left greater than right  There are no secretions in the vallecula  Palpation is limited of the oropharynx due to gag reflex, no palpable masses, no mucosal lesions  Base of tongue - no masses or mucosal lesions  No residual disease   Hypopharynx:  Normal pyriform sinus    No pooled secretions    Larynx:  Epiglottis, false vocal cords, true vocal cords normal in appearance, bilaterally mobile cords   The left arytenoid and AE fold have mild radiation edema that is not obstructive    Neck: No palpable masses  Moderate submental lymphedema  Mild fibrosis bilateral neck   Lymphatic:  No palpable masses   Cardiovascular:  warm, pink, well-perfused extremities without swelling, tenderness, or edema   Respiratory:  Normal respiratory effort, no stridor   Neuro/Psych.:  mood/affect -  normal  mental status -  normal         PROCEDURES:   Flexible fiberoptic laryngoscopy: Scope exam was indicated due to tonsil cancer. Verbal consent was obtained. The nasal cavity was prepped with an aerosolized solution of topical anesthetic and vasoconstrictive agent. The scope was passed through the anterior nasal cavity and advanced. Inspection of the nasopharynx revealed no gross abnormality.  There is no obvious residual mass in the left tonsil/oropharynx.  The  epiglottis has very minimal thickening.  There is radiation edema of the left AE fold and left arytenoid.  This is not obstructive.  The vocal cords are mobile bilaterally.  The piriform sinuses are clear. The airway is patent. Procedure tolerated well with no immediate complications noted.          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 He completed definitive chemoradiation on 6/12/2021.    He continues to recover from his treatment.  He was recommended to use a humidifier to try and help with some of his thick phlegm.  He can also use a salt and soda rinses.  He does have a serous effusion on the left but this is improved and is less symptomatic.    He was seen by SLP team today who reinforced continuing the exercises.     He is doing his lymphedema therapy at home.  We discussed the importance of doing this to help with the internal and external lymphedema.    His TSH was normal on recheck in November 2021. He is on synthroid. Will recheck in May 2022.    We will plan on repeating his imaging in March 2022.    I would like to see him back in 2 months with imaging.  He will return to [facility] clinic in 4 months.    Thank you very much for the opportunity to participate in the care of your patient.      Ligia Fang MD  Otolaryngology- Head & Neck Surgery    This note was dictated with voice recognition software and then edited. Please excuse any unintentional errors.       CC:  Ahmet Robbins MD  Department of Radiation Oncology  [facility]      Too Christensen MD  [address] [place] [address]      Shanthi Schrader MD  Department of Medical Oncology  [facility]

## 2022-02-04 NOTE — NURSING NOTE
"Chief Complaint   Patient presents with     RECHECK     2 month follow up in multi d       Blood pressure 101/65, pulse 75, temperature (!) 96.5  F (35.8  C), temperature source Temporal, height 1.727 m (5' 8\"), weight 89.4 kg (197 lb), SpO2 98 %.    Rhea Ovalles, EMT    "

## 2022-02-04 NOTE — LETTER
2022      RE: Quan Murphy  [address]  [facility] [address]          Department of Radiation Oncology  [facility]  [address] [place] [address]  [phone]       Radiation Oncology Follow-up Visit  2022      Quan Murphy  MRN: [id number]   : 1950     DISEASE TREATED:   cT2 N2 M0 p16 positive squamous cell carcinoma of the left tonsil    RADIATION THERAPY DELIVERED:   Left oropharynx and neck: 6996 cGy in 33 fractions, from 2021 - 2021    SYSTEMIC THERAPY:  Cisplatin 40 mg/m  weekly x5 weeks    INTERVAL SINCE COMPLETION OF RADIATION THERAPY:   8 months    SUBJECTIVE:   Quan Murphy is a 71 year old male with a PMH significant for a stage II p16-positive squamous cell carcinoma of the left tonsil diagnosed in 3/2021 after he presented with a 1 month history of left-sided otalgia and globus sensation. Staging evaluation revealed a 2.7 x 2.2 x 2.7 cm left tonsillar primary tumor with multiple involved cervical lymph nodes including a 1.8 cm left level 2/3 node, a 3.4 cm left level 4 node and a suspicious 1.4 cm contralateral right level 2 node. He received curative intent chemoradiotherapy with concurrent weekly cisplatin as described above.    Mr. Murphy returns to ENT multiD clinic today for a routine posttreatment disease surveillance visit. Overall, he reports that he is doing okay with increased mucus and plugging of his ear which he relates to a recent Covid infection that he is recovering from. He is eating a regular diet with the avoidance of some very dry foods due to ongoing treatment-induced xerostomia. He otherwise denies any odynophagia or dysphagia and reports that his taste continues to improve although is still suppressed compared to his pre-treatment baseline.    PHYSICAL EXAM:  Weight: 89.4 kg  BP: 101/65  Pulse: 75    General: Healthy-appearing 71-year-old gentleman seated comfortably in an examination chair no  acute distress  HEENT: NC/AT. EOMI. EACs clear bilaterally. Small serous effusion on the left with a normal-appearing TM on the right. No rhinorrhea or epistaxis. Mildly dry mucous membranes with tacky oral secretions. Scattered telangiectasias involving the oropharynx including the soft palate and posterior oropharyngeal wall. No visible oral cavity lesions. Palpation of the buccal mucosa, gingiva, floor mouth, oral tongue and bilateral tongue base reveal no nodularity, induration or masses.  Neck: Mild fibrosis of bilateral neck. No discrete cervical adenopathy palpated bilaterally.  Pulmonary: No wheezing or stridor respiratory distress  Skin: Scattered telangiectasias throughout the bilateral neck. No desquamation or ulceration.    Dr. Fang performed a flexible nasopharyngoscopy in clinic today. Please see her note for complete details regarding this procedure. Briefly, examination revealed post-treatment changes involving the oropharynx and supraglottic larynx without any evidence of recurrent disease.    LABS AND IMAGING:  No recent labs or imaging    IMPRESSION:   Mr. 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 He is 8 months out from the completion of treatment and remains clinically CHANDA.    PLAN:   1. Follow-up with Dr. Fang in 2 months and in multiD clinic in 4 months  2. Recheck TSH in 5/2022  3. CT neck and chest in 3/2022    Ahmet Robbins MD/PhD    Department of Radiation Oncology  [facility]

## 2022-02-07 NOTE — PROGRESS NOTES
[facility]    OUTPATIENT SPEECH LANGUAGE PATHOLOGY  PLAN OF TREATMENT FOR OUTPATIENT REHABILITATION AND PROGRESS NOTE                                                          Patient's Last Name, First Name, Quan Goodrich Date of Birth  1950   Provider's Name  [facility] Medical Record No.  [id number]    Onset Date  04/27/2021 Start of Care Date  04/27/2021   Type:     __PT   ___OT   _X_SLP Medical Diagnosis  Squamous cell carcinoma of tonsil   SLP Diagnosis  Oropharyngeal dysphagia Plan of Treatment  Frequency/Duration: 1x/2-3 months in conjunction with ENT clinic visit  Certification date from 1/22/22 to 4/22/22     Goals:  Goal Identifier Diet   Goal Description 1. Pt will tolerate regular textures and thin liquids with no overt clinical s/sx of penetration/aspiration in 100% of PO trials.    Target Date 04/22/22   Date Met      Progress (detail required for progress note):  Goal not met. Pt continues to demonstrate oropharyngeal dysphagia characterized by dysgeusia and xerostomia. He will benefit from ongoing SLP services to ensure tolerance of regular textures and thin liquids.      Goal Identifier Exercises   Goal Description 2. Pt will complete 10 reps of 5/5 oropharyngeal and jaw strengthening/ROM exercises with minimal written and/or verbal cues.    Target Date 04/22/22   Date Met      Progress (detail required for progress note):  Goal not met. Pt is intermittently completing exercises, but not at prescribed frequency. He will benefit from ongoing SLP services to facilitate exercise completion given risk of XRT induced fibrosis, and subsequent risk of progressive dysphagia which could lead to G tube dependence.          Beginning/End Dates of Progress Note Reporting Period:  10/24/22 to 1/22/22    Progress Toward Goals:   Progress this  reporting period: Please see above    Client Self (Subjective) Report for Progress Note Reporting Period: Quan Murphy is a 71-year-old male with a mQ2F1V8 p16+ SCC of the left tonsil s/p definitive chemoXRT finishing on 6/12/21. He had PEG placed near the end of treatment, but has since had this removed. He returns today for dysphagia follow up and is seen in conjunction with ENT MultiD clinic visit. Reports bothersome phlegm especially in the morning when he wakes up.                 I CERTIFY THE NEED FOR THESE SERVICES FURNISHED UNDER        THIS PLAN OF TREATMENT AND WHILE UNDER MY CARE     (Physician attestation of this document indicates review and certification of the therapy plan).                Referring Provider: Dr. Ligia Duong, SLP

## 2022-02-08 ENCOUNTER — MYC REFILL (OUTPATIENT)
Dept: INTERNAL MEDICINE | Facility: CLINIC | Age: 72
End: 2022-02-08
Payer: MEDICARE

## 2022-02-08 DIAGNOSIS — G89.3 NEOPLASM RELATED PAIN: ICD-10-CM

## 2022-02-08 DIAGNOSIS — M12.9 ARTHROPATHY: ICD-10-CM

## 2022-02-09 RX ORDER — HYDROCODONE BITARTRATE AND ACETAMINOPHEN 10; 325 MG/1; MG/1
1 TABLET ORAL 4 TIMES DAILY PRN
Qty: 120 TABLET | Refills: 0 | Status: SHIPPED | OUTPATIENT
Start: 2022-02-09 | End: 2022-03-03

## 2022-02-09 RX ORDER — HYDROXYZINE PAMOATE 25 MG/1
CAPSULE ORAL
Qty: 120 CAPSULE | Refills: 4 | Status: SHIPPED | OUTPATIENT
Start: 2022-02-09 | End: 2022-06-30 | Stop reason: ALTCHOICE

## 2022-02-09 NOTE — TELEPHONE ENCOUNTER
Pending Prescriptions:                       Disp   Refills    HYDROcodone-acetaminophen (NORCO)  M*120 ta*0        Sig: Take 1 tablet by mouth 4 times daily as needed for           severe pain    Routing refill request to provider for review/approval because:  Drug not on the [facility] refill protocol    Disp Refills Start End BERTIN   HYDROcodone-acetaminophen (NORCO)  MG per tablet 120 tablet 0 1/11/2022 2/10/2022 --   Sig - Route: Take 1 tablet by mouth 4 times daily as needed for severe pain - Oral

## 2022-02-10 ENCOUNTER — OFFICE VISIT (OUTPATIENT)
Dept: FAMILY MEDICINE | Facility: CLINIC | Age: 72
End: 2022-02-10
Payer: MEDICARE

## 2022-02-10 ENCOUNTER — ANESTHESIA EVENT (OUTPATIENT)
Dept: SURGERY | Facility: CLINIC | Age: 72
End: 2022-02-10
Payer: MEDICARE

## 2022-02-10 VITALS
DIASTOLIC BLOOD PRESSURE: 80 MMHG | SYSTOLIC BLOOD PRESSURE: 116 MMHG | OXYGEN SATURATION: 98 % | BODY MASS INDEX: 30.56 KG/M2 | RESPIRATION RATE: 14 BRPM | HEART RATE: 87 BPM | WEIGHT: 201 LBS | TEMPERATURE: 98.4 F

## 2022-02-10 DIAGNOSIS — Z01.818 PREOP GENERAL PHYSICAL EXAM: Primary | ICD-10-CM

## 2022-02-10 DIAGNOSIS — N21.0 BLADDER STONES: ICD-10-CM

## 2022-02-10 PROCEDURE — 99214 OFFICE O/P EST MOD 30 MIN: CPT | Performed by: FAMILY MEDICINE

## 2022-02-10 PROCEDURE — 93000 ELECTROCARDIOGRAM COMPLETE: CPT | Performed by: FAMILY MEDICINE

## 2022-02-10 ASSESSMENT — PAIN SCALES - GENERAL: PAINLEVEL: NO PAIN (0)

## 2022-02-10 NOTE — PATIENT INSTRUCTIONS
Preparing for Your Surgery  Getting started  A nurse will call you to review your health history and instructions. They will give you an arrival time based on your scheduled surgery time. Please be ready to share:    Your doctor's clinic name and phone number    Your medical, surgical and anesthesia history    A list of allergies and sensitivities    A list of medicines, including herbal treatments and over-the-counter drugs    Whether the patient has a legal guardian (ask how to send us the papers in advance)  Please tell us if you're pregnant--or if there's any chance you might be pregnant. Some surgeries may injure a fetus (unborn baby), so they require a pregnancy test. Surgeries that are safe for a fetus don't always need a test, and you can choose whether to have one.   If you have a child who's having surgery, please ask for a copy of Preparing for Your Child's Surgery.    Preparing for surgery    Within 30 days of surgery: Have a pre-op exam (sometimes called an H&P, or History and Physical). This can be done at a clinic or pre-operative center.  ? If you're having a , you may not need this exam. Talk to your care team.    At your pre-op exam, talk to your care team about all medicines you take. If you need to stop any medicines before surgery, ask when to start taking them again.  ? We do this for your safety. Many medicines can make you bleed too much during surgery. Some change how well surgery (anesthesia) drugs work.    Call your insurance company to let them know you're having surgery. (If you don't have insurance, call [phone].)    Call your clinic if there's any change in your health. This includes signs of a cold or flu (sore throat, runny nose, cough, rash, fever). It also includes a scrape or scratch near the surgery site.    If you have questions on the day of surgery, call your hospital or surgery center.  COVID testing  You may need to be tested for COVID-19 before having  surgery. If so, your surgical team will give you instructions for scheduling this test, separate from your preoperative history and physical.  Eating and drinking guidelines  For your safety: Unless your surgeon tells you otherwise, follow the guidelines below.    Eat and drink as usual until 8 hours before surgery. After that, no food or milk.    Drink clear liquids until 2 hours before surgery. These are liquids you can see through, like water, Gatorade and Propel Water. You may also have black coffee and tea (no cream or milk).    Nothing by mouth within 2 hours of surgery. This includes gum, candy and breath mints.    If you drink alcohol: Stop drinking it the night before surgery.    If your care team tells you to take medicine on the morning of surgery, it's okay to take it with a sip of water.  Preventing infection    Shower or bathe the night before and morning of your surgery. Follow the instructions your clinic gave you. (If no instructions, use regular soap.)    Don't shave or clip hair near your surgery site. We'll remove the hair if needed.    Don't smoke or vape the morning of surgery. You may chew nicotine gum up to 2 hours before surgery. A nicotine patch is okay.  ? Note: Some surgeries require you to completely quit smoking and nicotine. Check with your surgeon.    Your care team will make every effort to keep you safe from infection. We will:  ? Clean our hands often with soap and water (or an alcohol-based hand rub).  ? Clean the skin at your surgery site with a special soap that kills germs.  ? Give you a special gown to keep you warm. (Cold raises the risk of infection.)  ? Wear special hair covers, masks, gowns and gloves during surgery.  ? Give antibiotic medicine, if prescribed. Not all surgeries need antibiotics.  What to bring on the day of surgery    Photo ID and insurance card    Copy of your health care directive, if you have one    Glasses and hearing aides (bring cases)  ? You can't  wear contacts during surgery    Inhaler and eye drops, if you use them (tell us about these when you arrive)    CPAP machine or breathing device, if you use them    A few personal items, if spending the night    If you have . . .  ? A pacemaker, ICD (cardiac defibrillator) or other implant: Bring the ID card.  ? An implanted stimulator: Bring the remote control.  ? A legal guardian: Bring a copy of the certified (court-stamped) guardianship papers.  Please remove any jewelry, including body piercings. Leave jewelry and other valuables at home.  If you're going home the day of surgery    You must have a responsible adult drive you home. They should stay with you overnight as well.    If you don't have someone to stay with you, and you aren't safe to go home alone, we may keep you overnight. Insurance often won't pay for this.  After surgery  If it's hard to control your pain or you need more pain medicine, please call your surgeon's office.  Questions?   If you have any questions for your care team, list them here: _________________________________________________________________________________________________________________________________________________________________________ ____________________________________ ____________________________________ ____________________________________  For informational purposes only. Not to replace the advice of your health care provider. Copyright   2003, 2019 [facility]. All rights reserved. Clinically reviewed by Melissa Tovar MD. [employer] [id number] - REV 07/21.

## 2022-02-10 NOTE — PROGRESS NOTES
[address] [address]  Phone: [phone]  Fax: [phone]  Primary Provider: Mesha Hernandez  Pre-op Performing Provider: MESHA HERNANDEZ      PREOPERATIVE EVALUATION:  Today's date: 2/10/2022    Quan Murphy is a 71 year old male who presents for a preoperative evaluation.    Surgical Information:  Surgery/Procedure: CYSTOSCOPY and bladder stone removal  Surgery Location: [facility]  Surgeon: Sue  Surgery Date: 2/16/22  Time of Surgery: 415pm  Where patient plans to recover: At home with family  Fax number for surgical facility: Note does not need to be faxed, will be available electronically in Epic.    Type of Anesthesia Anticipated: General    Assessment & Plan     The proposed surgical procedure is considered INTERMEDIATE risk.    Preop general physical exam    - EKG 12-lead complete w/read - Clinics    Bladder stones             Risks and Recommendations:  The patient has the following additional risks and recommendations for perioperative complications:   -History of squamous cell carcinoma of the tonsil        RECOMMENDATION:  APPROVAL GIVEN to proceed with proposed procedure, without further diagnostic evaluation.                      Subjective     HPI related to upcoming procedure: Removal of bladder stones.    Preop Questions 2/3/2022   1. Have you ever had a heart attack or stroke? No   2. Have you ever had surgery on your heart or blood vessels, such as a stent placement, a coronary artery bypass, or surgery on an artery in your head, neck, heart, or legs? YES -    3. Do you have chest pain with activity? No   4. Do you have a history of  heart failure? No   5. Do you currently have a cold, bronchitis or symptoms of other infection? YES -    6. Do you have a cough, shortness of breath, or wheezing? No   7. Do you or anyone in your family have previous history of blood clots? No   8. Do you or does anyone in your family have a serious  bleeding problem such as prolonged bleeding following surgeries or cuts? No   9. Have you ever had problems with anemia or been told to take iron pills? YES -    10. Have you had any abnormal blood loss such as black, tarry or bloody stools? No   11. Have you ever had a blood transfusion? YES -    11a. Have you ever had a transfusion reaction? No   12. Are you willing to have a blood transfusion if it is medically needed before, during, or after your surgery? Yes   13. Have you or any of your relatives ever had problems with anesthesia? No   14. Do you have sleep apnea, excessive snoring or daytime drowsiness? No   15. Do you have any artifical heart valves or other implanted medical devices like a pacemaker, defibrillator, or continuous glucose monitor? No   16. Do you have artificial joints? YES -    17. Are you allergic to latex? No       Health Care Directive:  Patient has a Health Care Directive on file      Preoperative Review of :   reviewed - controlled substances reflected in medication list.          Review of Systems  Constitutional, neuro, ENT, endocrine, pulmonary, cardiac, gastrointestinal, genitourinary, musculoskeletal, integument and psychiatric systems are negative, except as otherwise noted.    Patient Active Problem List    Diagnosis Date Noted     Chronic kidney disease, stage 3 (H) 06/15/2021     Priority: Medium     Failure to thrive (0-17) 06/02/2021     Priority: Medium     Replacing diagnoses that were inactivated after the 10/1/2021 regulatory import.       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  Could send to  for SURV       Hypomagnesemia 04/13/2021     Priority: Medium     Hiatal hernia 02/17/2020     Priority: Medium     Renal hematoma 10/28/2017     Priority: Medium     Retroperitoneal hematoma 10/28/2017     Priority: Medium     Syncope 10/18/2017     Priority: Medium     S/P ORIF (open reduction internal  fixation) fracture 08/03/2017     Priority: Medium     Closed Colles' fracture of right radius with routine healing, subsequent encounter 08/03/2017     Priority: Medium     Status post insertion of drug-eluting stent into left anterior descending artery for coronary artery disease 01/05/2017     Priority: Medium     Chest pain 12/24/2016     Priority: Medium     Arthropathy 02/04/2014     Priority: Medium     Problem list name updated by automated process. Provider to review       Coronary atherosclerosis      Priority: Medium     Coronary artery disease  Problem list name updated by automated process. Provider to review       Hallux rigidus 07/16/2013     Priority: Medium     Inguinal hernia 06/28/2013     Priority: Medium     Degenerative arthritis of foot 06/28/2013     Priority: Medium     Advanced directives, counseling/discussion 05/24/2013     Priority: Medium     Advance Care Planning:   Receipt of ACP document:  Received: Health Care Directive which was witnessed or notarized on 8-18-08.  Document previously scanned on 7-8-13.  Validation form completed and sent to be scanned.  Code Status needs to be updated to reflect choices in most recent ACP document. Confirmed/documented designated decision maker(s). See permanent comments section of demographics in clinical tab. View document(s) and details by clicking on code status.   Added by Vandana Platt RN, System ACP Coordinator on 7/22/2013.    Advance Care Planning:   ACP Review and Resources Provided:  Reviewed chart for advance care plan.  Quan Murphy has no plan or code status on file however states presence of ACP document. Copy requested. Confirmed code status reflects current choices pending receipt of document/advance care plan review. Confirmed/documented designated decision maker(s). See permanent comments section of demographics in clinical tab.   Added by Krysten Jasmine on 5/24/2013             Hypertension goal BP (blood pressure) < 140/90  06/12/2012     Priority: Medium     Hyperlipidemia LDL goal <130 12/22/2011     Priority: Medium     Anxiety 11/02/2011     Priority: Medium     Allergic conjunctivitis 07/08/2008     Priority: Medium     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, insomnia type 11/17/2006     Priority: Medium     Problem list name updated by automated process. Provider to review       Acute reaction to stress 01/12/2005     Priority: Medium     Problem list name updated by automated process. Provider to review       Chronic maxillary sinusitis 01/12/2005     Priority: Medium     Contracture of palmar fascia 01/12/2005     Priority: Medium     Benign neoplasm of skin of other and unspecified parts of face 01/12/2005     Priority: Medium     Malignant neoplasm of prostate (H) 11/26/2004     Priority: Medium      Past Medical History:   Diagnosis Date     Allergic rhinitis      Allergy, unspecified not elsewhere classified     Seasonal allergies, pollen, dust, smoke and animals     Antiplatelet or antithrombotic long-term use      Anxiety      Arthritis      Chest pain      Chronic sinusitis      Coronary atherosclerosis of unspecified type of vessel, native or graft     Coronary artery disease     Depressive disorder 1995     Gastroesophageal reflux disease 2020    Cleared with medication     Head injury 1954     Hiatal hernia 2015    Right Side     History of blood transfusion 12/15/2004    Prostate Surgery - My own blood     Hyperlipidemia      Hypertension      Inguinal hernia      Kidney problem 10/08/2017    Lithotripsy     Kidney stones      Malignant neoplasm of prostate (H)     Prostate cancer     Prostate cancer (H)      Syncope      Past Surgical History:   Procedure Laterality Date     ARTHRODESIS FOOT  7/23/2013    Procedure: ARTHRODESIS FOOT;  Great Toe Arthrodesis Left Foot;  Surgeon: Ash Gonzalez DPM;  Location: [facility] OR     ARTHRODESIS FOOT  6/10/2014    Procedure: ARTHRODESIS FOOT;  Surgeon: Ash Gonzalez  LUIS Champion;  Location: [facility] OR     BIOPSY  10/1/2004    dermatologist biopsies     COLONOSCOPY  10/7/2013    Procedure: COLONOSCOPY;  Colonoscopy;  Surgeon: Mike Fallon MD;  Location: [facility]     ESOPHAGOSCOPY, GASTROSCOPY, DUODENOSCOPY (EGD), COMBINED N/A 2/12/2020    Procedure: ESOPHAGOGASTRODUODENOSCOPY (EGD);  Surgeon: Sam Escobar MD;  Location: [facility]     EXTRACORPOREAL SHOCK WAVE LITHOTRIPSY (ESWL) Bilateral 10/18/2017    Procedure: EXTRACORPOREAL SHOCK WAVE LITHOTRIPSY (ESWL);  BILATERAL EXTRACORPOREAL SHOCKWAVE LITHOTRIPSY ;  Surgeon: Meir Torres MD;  Location: [facility] OR     HC CORRECT BUNION,SIMPLE  08/11/2005    x3     HC REMV TOE BENIGN BONE LESN  08/11/2005     HEAD & NECK SURGERY  1954    From a fall     HERNIORRHAPHY INGUINAL  7/3/2013    Procedure: HERNIORRHAPHY INGUINAL;  Open Repair Inguinal hernia Right with mesh ;  Surgeon: Sam Escobar MD;  Location: [facility]     IR GASTROSTOMY TUBE PERCUTANEOUS PLCMNT  6/7/2021     MOHS MICROGRAPHIC PROCEDURE  08/23/11    [facility]     OPEN REDUCTION INTERNAL FIXATION WRIST Right 7/18/2017    Procedure: OPEN REDUCTION INTERNAL FIXATION WRIST;  Right distal radius open reduction and internal fixation;  Surgeon: Pedro Blanca DO;  Location: [facility]     RECONSTRUCT FOREFOOT WITH METATARSOPHALANGEAL (MTP) FUSION  6/10/2014    Procedure: RECONSTRUCT FOREFOOT WITH METATARSOPHALANGEAL (MTP) FUSION;  Surgeon: Ash Gonzalez DPM;  Location: [facility]     STENT, CORONARY, DEMI       SURGICAL HISTORY OF -   1999/1974    lt knee     SURGICAL HISTORY OF -   10/2004    lithotripsy     SURGICAL HISTORY OF -   11/05    angiogram with stent     VASCULAR SURGERY  11/17/2005    Puncture of iliac artery during and angiogram     [facility] LAPAROSCOPY, SURGICAL PROSTATECTOMY, RETROPUBIC RADICAL, W/NERVE SPARING  11/30/2004    With full bilateral pelvic lymphadenectomy.  [facility].     [facility] TOTAL KNEE ARTHROPLASTY  05/01/08    Left knee      Current Outpatient Medications   Medication Sig Dispense Refill     cetirizine (ZYRTEC) 10 MG tablet Take 10 mg by mouth       cevimeline (EVOXAC) 30 MG capsule Take 1 capsule (30 mg) by mouth 3 times daily 120 capsule 11     citalopram (CELEXA) 40 MG tablet TAKE ONE TABLET BY MOUTH ONCE DAILY 90 tablet 0     dronabinol (MARINOL) 2.5 MG capsule Take 1 capsule (2.5 mg) by mouth 2 times daily (before meals) 28 capsule 3     HYDROcodone-acetaminophen (NORCO)  MG per tablet Take 1 tablet by mouth 4 times daily as needed for severe pain 120 tablet 0     hydrOXYzine (VISTARIL) 25 MG capsule TAKE ONE CAPSULE BY MOUTH FOUR TIMES A DAY AS NEEDED FOR ANXIETY 120 capsule 4     metoprolol succinate ER (TOPROL-XL) 50 MG 24 hr tablet Take 1 tablet (50 mg) by mouth daily 90 tablet 2     omeprazole (PRILOSEC) 40 MG DR capsule Take 1 capsule (40 mg) by mouth daily 90 capsule 3     pilocarpine (SALAGEN) 5 MG tablet Take 1 tablet (5 mg) by mouth 3 times daily 90 tablet 3     rosuvastatin (CRESTOR) 40 MG tablet Take 1 tablet (40 mg) by mouth daily 90 tablet 3     zolpidem ER (AMBIEN CR) 12.5 MG CR tablet Take 1 tablet (12.5 mg) by mouth nightly as needed for sleep 30 tablet 3     ALPRAZolam (XANAX) 0.5 MG tablet Take 1 tablet (0.5 mg) by mouth 2 times daily as needed for anxiety (Patient not taking: Reported on 2/10/2022) 60 tablet 0     nitroGLYcerin (NITROSTAT) 0.4 MG sublingual tablet For chest pain place 1 tablet under the tongue every 5 minutes for 3 doses. If symptoms persist 5 minutes after 1st dose call 911. (Patient not taking: Reported on 9/30/2021) 25 tablet 0       Allergies   Allergen Reactions     Animal Dander      Azithromycin Nausea and Vomiting     Dust Mites      Pollen Extract      Smoke.         Social History     Tobacco Use     Smoking status: Never Smoker     Smokeless tobacco: Never Used   Substance Use Topics     Alcohol use: Yes     Alcohol/week: 8.3 standard drinks     Types: 10 Cans of beer per  week     Comment: occasional     Family History   Problem Relation Age of Onset     Hypertension Father         Lived to age 87     Connective Tissue Disorder Mother         LUPUS     Heart Disease Mother         Valve replacement     Anxiety Disorder Mother         Lived to age 84     Dementia Mother         Nursing Home (lived to age 86)     History   Drug Use No         Objective     /80   Pulse 87   Temp 98.4  F (36.9  C) (Temporal)   Resp 14   Wt 91.2 kg (201 lb)   SpO2 98%   BMI 30.56 kg/m      Physical Exam    GENERAL APPEARANCE: healthy, alert and no distress     EYES: EOMI,  PERRL     HENT: ear canals and TM's normal and nose and mouth without ulcers or lesions     NECK: no adenopathy, no asymmetry, masses, or scars and thyroid normal to palpation     RESP: lungs clear to auscultation - no rales, rhonchi or wheezes     CV: regular rates and rhythm, normal S1 S2, no S3 or S4 and no murmur, click or rub     ABDOMEN:  soft, nontender, no HSM or masses and bowel sounds normal     MS: extremities normal- no gross deformities noted, no evidence of inflammation in joints, FROM in all extremities.     SKIN: no suspicious lesions or rashes     NEURO: Normal strength and tone, sensory exam grossly normal, mentation intact and speech normal     PSYCH: mentation appears normal. and affect normal/bright     LYMPHATICS: No cervical adenopathy    Recent Labs   Lab Test 11/15/21  1107 11/15/21  1106 11/09/21  1215 11/01/21  1337 06/07/21  2052 06/07/21  0953   HGB 15.3  --   --  15.1   < >  --    *  --   --  164   < >  --    INR  --   --   --   --   --  1.49*   NA  --  136 139 139   < >  --    POTASSIUM  --  4.3 4.1 4.1   < >  --    CR  --  1.18 1.11 1.09   < >  --     < > = values in this interval not displayed.        Diagnostics:  No labs were ordered during this visit.   EKG: appears normal, NSR, normal axis, normal intervals, no acute ST/T changes c/w ischemia, no LVH by voltage criteria,  nonspecific ST-T changes    Revised Cardiac Risk Index (RCRI):  The patient has the following serious cardiovascular risks for perioperative complications:       RCRI Interpretation: 1 point: Class II (low risk - 0.9% complication rate)           Signed Electronically by: Jose Hernandez MD  Copy of this evaluation report is provided to requesting physician.

## 2022-02-10 NOTE — H&P (VIEW-ONLY)
[address] [address]  Phone: [phone]  Fax: [phone]  Primary Provider: Mesha Hernandez  Pre-op Performing Provider: MESHA HERNANDEZ      PREOPERATIVE EVALUATION:  Today's date: 2/10/2022    Quan Murphy is a 71 year old male who presents for a preoperative evaluation.    Surgical Information:  Surgery/Procedure: CYSTOSCOPY and bladder stone removal  Surgery Location: [facility]  Surgeon: Sue  Surgery Date: 2/16/22  Time of Surgery: 415pm  Where patient plans to recover: At home with family  Fax number for surgical facility: Note does not need to be faxed, will be available electronically in Epic.    Type of Anesthesia Anticipated: General    Assessment & Plan     The proposed surgical procedure is considered INTERMEDIATE risk.    Preop general physical exam    - EKG 12-lead complete w/read - Clinics    Bladder stones             Risks and Recommendations:  The patient has the following additional risks and recommendations for perioperative complications:   -History of squamous cell carcinoma of the tonsil        RECOMMENDATION:  APPROVAL GIVEN to proceed with proposed procedure, without further diagnostic evaluation.                      Subjective     HPI related to upcoming procedure: Removal of bladder stones.    Preop Questions 2/3/2022   1. Have you ever had a heart attack or stroke? No   2. Have you ever had surgery on your heart or blood vessels, such as a stent placement, a coronary artery bypass, or surgery on an artery in your head, neck, heart, or legs? YES -    3. Do you have chest pain with activity? No   4. Do you have a history of  heart failure? No   5. Do you currently have a cold, bronchitis or symptoms of other infection? YES -    6. Do you have a cough, shortness of breath, or wheezing? No   7. Do you or anyone in your family have previous history of blood clots? No   8. Do you or does anyone in your family have a serious  bleeding problem such as prolonged bleeding following surgeries or cuts? No   9. Have you ever had problems with anemia or been told to take iron pills? YES -    10. Have you had any abnormal blood loss such as black, tarry or bloody stools? No   11. Have you ever had a blood transfusion? YES -    11a. Have you ever had a transfusion reaction? No   12. Are you willing to have a blood transfusion if it is medically needed before, during, or after your surgery? Yes   13. Have you or any of your relatives ever had problems with anesthesia? No   14. Do you have sleep apnea, excessive snoring or daytime drowsiness? No   15. Do you have any artifical heart valves or other implanted medical devices like a pacemaker, defibrillator, or continuous glucose monitor? No   16. Do you have artificial joints? YES -    17. Are you allergic to latex? No       Health Care Directive:  Patient has a Health Care Directive on file      Preoperative Review of :   reviewed - controlled substances reflected in medication list.          Review of Systems  Constitutional, neuro, ENT, endocrine, pulmonary, cardiac, gastrointestinal, genitourinary, musculoskeletal, integument and psychiatric systems are negative, except as otherwise noted.    Patient Active Problem List    Diagnosis Date Noted     Chronic kidney disease, stage 3 (H) 06/15/2021     Priority: Medium     Failure to thrive (0-17) 06/02/2021     Priority: Medium     Replacing diagnoses that were inactivated after the 10/1/2021 regulatory import.       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  Could send to  for SURV       Hypomagnesemia 04/13/2021     Priority: Medium     Hiatal hernia 02/17/2020     Priority: Medium     Renal hematoma 10/28/2017     Priority: Medium     Retroperitoneal hematoma 10/28/2017     Priority: Medium     Syncope 10/18/2017     Priority: Medium     S/P ORIF (open reduction internal  fixation) fracture 08/03/2017     Priority: Medium     Closed Colles' fracture of right radius with routine healing, subsequent encounter 08/03/2017     Priority: Medium     Status post insertion of drug-eluting stent into left anterior descending artery for coronary artery disease 01/05/2017     Priority: Medium     Chest pain 12/24/2016     Priority: Medium     Arthropathy 02/04/2014     Priority: Medium     Problem list name updated by automated process. Provider to review       Coronary atherosclerosis      Priority: Medium     Coronary artery disease  Problem list name updated by automated process. Provider to review       Hallux rigidus 07/16/2013     Priority: Medium     Inguinal hernia 06/28/2013     Priority: Medium     Degenerative arthritis of foot 06/28/2013     Priority: Medium     Advanced directives, counseling/discussion 05/24/2013     Priority: Medium     Advance Care Planning:   Receipt of ACP document:  Received: Health Care Directive which was witnessed or notarized on 8-18-08.  Document previously scanned on 7-8-13.  Validation form completed and sent to be scanned.  Code Status needs to be updated to reflect choices in most recent ACP document. Confirmed/documented designated decision maker(s). See permanent comments section of demographics in clinical tab. View document(s) and details by clicking on code status.   Added by Vandana Platt RN, System ACP Coordinator on 7/22/2013.    Advance Care Planning:   ACP Review and Resources Provided:  Reviewed chart for advance care plan.  Quan Murphy has no plan or code status on file however states presence of ACP document. Copy requested. Confirmed code status reflects current choices pending receipt of document/advance care plan review. Confirmed/documented designated decision maker(s). See permanent comments section of demographics in clinical tab.   Added by Krysten Jasmine on 5/24/2013             Hypertension goal BP (blood pressure) < 140/90  06/12/2012     Priority: Medium     Hyperlipidemia LDL goal <130 12/22/2011     Priority: Medium     Anxiety 11/02/2011     Priority: Medium     Allergic conjunctivitis 07/08/2008     Priority: Medium     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, insomnia type 11/17/2006     Priority: Medium     Problem list name updated by automated process. Provider to review       Acute reaction to stress 01/12/2005     Priority: Medium     Problem list name updated by automated process. Provider to review       Chronic maxillary sinusitis 01/12/2005     Priority: Medium     Contracture of palmar fascia 01/12/2005     Priority: Medium     Benign neoplasm of skin of other and unspecified parts of face 01/12/2005     Priority: Medium     Malignant neoplasm of prostate (H) 11/26/2004     Priority: Medium      Past Medical History:   Diagnosis Date     Allergic rhinitis      Allergy, unspecified not elsewhere classified     Seasonal allergies, pollen, dust, smoke and animals     Antiplatelet or antithrombotic long-term use      Anxiety      Arthritis      Chest pain      Chronic sinusitis      Coronary atherosclerosis of unspecified type of vessel, native or graft     Coronary artery disease     Depressive disorder 1995     Gastroesophageal reflux disease 2020    Cleared with medication     Head injury 1954     Hiatal hernia 2015    Right Side     History of blood transfusion 12/15/2004    Prostate Surgery - My own blood     Hyperlipidemia      Hypertension      Inguinal hernia      Kidney problem 10/08/2017    Lithotripsy     Kidney stones      Malignant neoplasm of prostate (H)     Prostate cancer     Prostate cancer (H)      Syncope      Past Surgical History:   Procedure Laterality Date     ARTHRODESIS FOOT  7/23/2013    Procedure: ARTHRODESIS FOOT;  Great Toe Arthrodesis Left Foot;  Surgeon: Ash Gonzalez DPM;  [facility]     ARTHRODESIS FOOT  6/10/2014    Procedure: ARTHRODESIS FOOT;  Surgeon: Ash Gonzalez  LUIS Champion;  Location: [facility]     BIOPSY  10/1/2004    dermatologist biopsies     COLONOSCOPY  10/7/2013    Procedure: COLONOSCOPY;  Colonoscopy;  Surgeon: Mike Fallon MD;  [facility]     ESOPHAGOSCOPY, GASTROSCOPY, DUODENOSCOPY (EGD), COMBINED N/A 2/12/2020    Procedure: ESOPHAGOGASTRODUODENOSCOPY (EGD);  Surgeon: Sam Escobar MD;  [facility]     EXTRACORPOREAL SHOCK WAVE LITHOTRIPSY (ESWL) Bilateral 10/18/2017    Procedure: EXTRACORPOREAL SHOCK WAVE LITHOTRIPSY (ESWL);  BILATERAL EXTRACORPOREAL SHOCKWAVE LITHOTRIPSY ;  Surgeon: Meir Torres MD;  Location: [facility] OR     HC CORRECT BUNION,SIMPLE  08/11/2005    x3     HC REMV TOE BENIGN BONE LESN  08/11/2005     HEAD & NECK SURGERY  1954    From a fall     HERNIORRHAPHY INGUINAL  7/3/2013    Procedure: HERNIORRHAPHY INGUINAL;  Open Repair Inguinal hernia Right with mesh ;  Surgeon: Sam Escobar MD;  [facility]     IR GASTROSTOMY TUBE PERCUTANEOUS PLCMNT  6/7/2021     MOHS MICROGRAPHIC PROCEDURE  08/23/11    ear and chin-[facility]     OPEN REDUCTION INTERNAL FIXATION WRIST Right 7/18/2017    Procedure: OPEN REDUCTION INTERNAL FIXATION WRIST;  Right distal radius open reduction and internal fixation;  Surgeon: Pedro Blanca DO;  [facility]     RECONSTRUCT FOREFOOT WITH METATARSOPHALANGEAL (MTP) FUSION  6/10/2014    Procedure: RECONSTRUCT FOREFOOT WITH METATARSOPHALANGEAL (MTP) FUSION;  Surgeon: Ash Gonzalez DPM;  [facility]     STENT, CORONARY, DEMI       SURGICAL HISTORY OF -   1999/1974    lt knee     SURGICAL HISTORY OF -   10/2004    lithotripsy     SURGICAL HISTORY OF -   11/05    angiogram with stent     VASCULAR SURGERY  11/17/2005    Puncture of iliac artery during and angiogram     [facility] LAPAROSCOPY, SURGICAL PROSTATECTOMY, RETROPUBIC RADICAL, W/NERVE SPARING  11/30/2004    With full bilateral pelvic lymphadenectomy.  [facility].     [facility] TOTAL KNEE ARTHROPLASTY  05/01/08    Left knee      Current Outpatient Medications   Medication Sig Dispense Refill     cetirizine (ZYRTEC) 10 MG tablet Take 10 mg by mouth       cevimeline (EVOXAC) 30 MG capsule Take 1 capsule (30 mg) by mouth 3 times daily 120 capsule 11     citalopram (CELEXA) 40 MG tablet TAKE ONE TABLET BY MOUTH ONCE DAILY 90 tablet 0     dronabinol (MARINOL) 2.5 MG capsule Take 1 capsule (2.5 mg) by mouth 2 times daily (before meals) 28 capsule 3     HYDROcodone-acetaminophen (NORCO)  MG per tablet Take 1 tablet by mouth 4 times daily as needed for severe pain 120 tablet 0     hydrOXYzine (VISTARIL) 25 MG capsule TAKE ONE CAPSULE BY MOUTH FOUR TIMES A DAY AS NEEDED FOR ANXIETY 120 capsule 4     metoprolol succinate ER (TOPROL-XL) 50 MG 24 hr tablet Take 1 tablet (50 mg) by mouth daily 90 tablet 2     omeprazole (PRILOSEC) 40 MG DR capsule Take 1 capsule (40 mg) by mouth daily 90 capsule 3     pilocarpine (SALAGEN) 5 MG tablet Take 1 tablet (5 mg) by mouth 3 times daily 90 tablet 3     rosuvastatin (CRESTOR) 40 MG tablet Take 1 tablet (40 mg) by mouth daily 90 tablet 3     zolpidem ER (AMBIEN CR) 12.5 MG CR tablet Take 1 tablet (12.5 mg) by mouth nightly as needed for sleep 30 tablet 3     ALPRAZolam (XANAX) 0.5 MG tablet Take 1 tablet (0.5 mg) by mouth 2 times daily as needed for anxiety (Patient not taking: Reported on 2/10/2022) 60 tablet 0     nitroGLYcerin (NITROSTAT) 0.4 MG sublingual tablet For chest pain place 1 tablet under the tongue every 5 minutes for 3 doses. If symptoms persist 5 minutes after 1st dose call 911. (Patient not taking: Reported on 9/30/2021) 25 tablet 0       Allergies   Allergen Reactions     Animal Dander      Azithromycin Nausea and Vomiting     Dust Mites      Pollen Extract      Smoke.         Social History     Tobacco Use     Smoking status: Never Smoker     Smokeless tobacco: Never Used   Substance Use Topics     Alcohol use: Yes     Alcohol/week: 8.3 standard drinks     Types: 10 Cans of beer per  week     Comment: occasional     Family History   Problem Relation Age of Onset     Hypertension Father         Lived to age 87     Connective Tissue Disorder Mother         LUPUS     Heart Disease Mother         Valve replacement     Anxiety Disorder Mother         Lived to age 84     Dementia Mother         Nursing Home (lived to age 86)     History   Drug Use No         Objective     /80   Pulse 87   Temp 98.4  F (36.9  C) (Temporal)   Resp 14   Wt 91.2 kg (201 lb)   SpO2 98%   BMI 30.56 kg/m      Physical Exam    GENERAL APPEARANCE: healthy, alert and no distress     EYES: EOMI,  PERRL     HENT: ear canals and TM's normal and nose and mouth without ulcers or lesions     NECK: no adenopathy, no asymmetry, masses, or scars and thyroid normal to palpation     RESP: lungs clear to auscultation - no rales, rhonchi or wheezes     CV: regular rates and rhythm, normal S1 S2, no S3 or S4 and no murmur, click or rub     ABDOMEN:  soft, nontender, no HSM or masses and bowel sounds normal     MS: extremities normal- no gross deformities noted, no evidence of inflammation in joints, FROM in all extremities.     SKIN: no suspicious lesions or rashes     NEURO: Normal strength and tone, sensory exam grossly normal, mentation intact and speech normal     PSYCH: mentation appears normal. and affect normal/bright     LYMPHATICS: No cervical adenopathy    Recent Labs   Lab Test 11/15/21  1107 11/15/21  1106 11/09/21  1215 11/01/21  1337 06/07/21  2052 06/07/21  0953   HGB 15.3  --   --  15.1   < >  --    *  --   --  164   < >  --    INR  --   --   --   --   --  1.49*   NA  --  136 139 139   < >  --    POTASSIUM  --  4.3 4.1 4.1   < >  --    CR  --  1.18 1.11 1.09   < >  --     < > = values in this interval not displayed.        Diagnostics:  No labs were ordered during this visit.   EKG: appears normal, NSR, normal axis, normal intervals, no acute ST/T changes c/w ischemia, no LVH by voltage criteria,  nonspecific ST-T changes    Revised Cardiac Risk Index (RCRI):  The patient has the following serious cardiovascular risks for perioperative complications:       RCRI Interpretation: 1 point: Class II (low risk - 0.9% complication rate)           Signed Electronically by: Jose Hernandez MD  Copy of this evaluation report is provided to requesting physician.

## 2022-02-12 ENCOUNTER — LAB (OUTPATIENT)
Dept: LAB | Facility: CLINIC | Age: 72
End: 2022-02-12
Payer: MEDICARE

## 2022-02-12 DIAGNOSIS — Z11.59 ENCOUNTER FOR SCREENING FOR OTHER VIRAL DISEASES: ICD-10-CM

## 2022-02-12 PROCEDURE — U0005 INFEC AGEN DETEC AMPLI PROBE: HCPCS

## 2022-02-12 PROCEDURE — U0003 INFECTIOUS AGENT DETECTION BY NUCLEIC ACID (DNA OR RNA); SEVERE ACUTE RESPIRATORY SYNDROME CORONAVIRUS 2 (SARS-COV-2) (CORONAVIRUS DISEASE [COVID-19]), AMPLIFIED PROBE TECHNIQUE, MAKING USE OF HIGH THROUGHPUT TECHNOLOGIES AS DESCRIBED BY CMS-2020-01-R: HCPCS

## 2022-02-13 LAB — SARS-COV-2 RNA RESP QL NAA+PROBE: NEGATIVE

## 2022-02-16 ENCOUNTER — ANESTHESIA (OUTPATIENT)
Dept: SURGERY | Facility: CLINIC | Age: 72
End: 2022-02-16
Payer: MEDICARE

## 2022-02-16 ENCOUNTER — HOSPITAL ENCOUNTER (OUTPATIENT)
Facility: CLINIC | Age: 72
Discharge: HOME | End: 2022-02-16
Attending: UROLOGY | Admitting: UROLOGY
Payer: MEDICARE

## 2022-02-16 VITALS
RESPIRATION RATE: 16 BRPM | SYSTOLIC BLOOD PRESSURE: 128 MMHG | TEMPERATURE: 97.5 F | HEIGHT: 68 IN | WEIGHT: 201 LBS | HEART RATE: 62 BPM | BODY MASS INDEX: 30.46 KG/M2 | DIASTOLIC BLOOD PRESSURE: 78 MMHG | OXYGEN SATURATION: 99 %

## 2022-02-16 DIAGNOSIS — N21.0 BLADDER STONES: Primary | ICD-10-CM

## 2022-02-16 PROCEDURE — 360N000075 HC SURGERY LEVEL 2, PER MIN: Performed by: UROLOGY

## 2022-02-16 PROCEDURE — 272N000001 HC OR GENERAL SUPPLY STERILE: Performed by: UROLOGY

## 2022-02-16 PROCEDURE — 52317 REMOVE BLADDER STONE: CPT | Performed by: UROLOGY

## 2022-02-16 PROCEDURE — 250N000025 HC SEVOFLURANE, PER MIN: Performed by: UROLOGY

## 2022-02-16 PROCEDURE — 710N000010 HC RECOVERY PHASE 1, LEVEL 2, PER MIN: Performed by: UROLOGY

## 2022-02-16 PROCEDURE — 999N000141 HC STATISTIC PRE-PROCEDURE NURSING ASSESSMENT: Performed by: UROLOGY

## 2022-02-16 PROCEDURE — 250N000013 HC RX MED GY IP 250 OP 250 PS 637: Performed by: NURSE ANESTHETIST, CERTIFIED REGISTERED

## 2022-02-16 PROCEDURE — 258N000003 HC RX IP 258 OP 636: Performed by: NURSE ANESTHETIST, CERTIFIED REGISTERED

## 2022-02-16 PROCEDURE — 250N000009 HC RX 250: Performed by: NURSE ANESTHETIST, CERTIFIED REGISTERED

## 2022-02-16 PROCEDURE — 710N000012 HC RECOVERY PHASE 2, PER MINUTE: Performed by: UROLOGY

## 2022-02-16 PROCEDURE — 370N000017 HC ANESTHESIA TECHNICAL FEE, PER MIN: Performed by: UROLOGY

## 2022-02-16 PROCEDURE — 250N000011 HC RX IP 250 OP 636: Performed by: NURSE ANESTHETIST, CERTIFIED REGISTERED

## 2022-02-16 RX ORDER — FENTANYL CITRATE 50 UG/ML
INJECTION, SOLUTION INTRAMUSCULAR; INTRAVENOUS PRN
Status: DISCONTINUED | OUTPATIENT
Start: 2022-02-16 | End: 2022-02-16

## 2022-02-16 RX ORDER — ONDANSETRON 2 MG/ML
INJECTION INTRAMUSCULAR; INTRAVENOUS PRN
Status: DISCONTINUED | OUTPATIENT
Start: 2022-02-16 | End: 2022-02-16

## 2022-02-16 RX ORDER — CEPHALEXIN 500 MG/1
500 CAPSULE ORAL 3 TIMES DAILY
Qty: 9 CAPSULE | Refills: 0 | Status: SHIPPED | OUTPATIENT
Start: 2022-02-16 | End: 2022-02-19

## 2022-02-16 RX ORDER — HYDROCODONE BITARTRATE AND ACETAMINOPHEN 5; 325 MG/1; MG/1
1-2 TABLET ORAL EVERY 4 HOURS PRN
Qty: 10 TABLET | Refills: 0 | Status: SHIPPED | OUTPATIENT
Start: 2022-02-16 | End: 2022-04-21

## 2022-02-16 RX ORDER — LIDOCAINE HYDROCHLORIDE 20 MG/ML
INJECTION, SOLUTION INFILTRATION; PERINEURAL PRN
Status: DISCONTINUED | OUTPATIENT
Start: 2022-02-16 | End: 2022-02-16

## 2022-02-16 RX ORDER — SODIUM CHLORIDE, SODIUM LACTATE, POTASSIUM CHLORIDE, CALCIUM CHLORIDE 600; 310; 30; 20 MG/100ML; MG/100ML; MG/100ML; MG/100ML
INJECTION, SOLUTION INTRAVENOUS CONTINUOUS
Status: DISCONTINUED | OUTPATIENT
Start: 2022-02-16 | End: 2022-02-16 | Stop reason: HOSPADM

## 2022-02-16 RX ORDER — DIMENHYDRINATE 50 MG/ML
25 INJECTION, SOLUTION INTRAMUSCULAR; INTRAVENOUS
Status: DISCONTINUED | OUTPATIENT
Start: 2022-02-16 | End: 2022-02-16 | Stop reason: HOSPADM

## 2022-02-16 RX ORDER — HYDROMORPHONE HYDROCHLORIDE 1 MG/ML
0.4 INJECTION, SOLUTION INTRAMUSCULAR; INTRAVENOUS; SUBCUTANEOUS EVERY 5 MIN PRN
Status: DISCONTINUED | OUTPATIENT
Start: 2022-02-16 | End: 2022-02-16 | Stop reason: HOSPADM

## 2022-02-16 RX ORDER — ONDANSETRON 2 MG/ML
4 INJECTION INTRAMUSCULAR; INTRAVENOUS EVERY 30 MIN PRN
Status: DISCONTINUED | OUTPATIENT
Start: 2022-02-16 | End: 2022-02-16 | Stop reason: HOSPADM

## 2022-02-16 RX ORDER — CEFAZOLIN SODIUM 2 G/100ML
2 INJECTION, SOLUTION INTRAVENOUS SEE ADMIN INSTRUCTIONS
Status: DISCONTINUED | OUTPATIENT
Start: 2022-02-16 | End: 2022-02-16 | Stop reason: HOSPADM

## 2022-02-16 RX ORDER — MEPERIDINE HYDROCHLORIDE 25 MG/ML
12.5 INJECTION INTRAMUSCULAR; INTRAVENOUS; SUBCUTANEOUS
Status: DISCONTINUED | OUTPATIENT
Start: 2022-02-16 | End: 2022-02-16 | Stop reason: HOSPADM

## 2022-02-16 RX ORDER — OXYCODONE HYDROCHLORIDE 5 MG/1
5 TABLET ORAL EVERY 4 HOURS PRN
Status: DISCONTINUED | OUTPATIENT
Start: 2022-02-16 | End: 2022-02-16 | Stop reason: HOSPADM

## 2022-02-16 RX ORDER — CEFAZOLIN SODIUM 2 G/100ML
2 INJECTION, SOLUTION INTRAVENOUS
Status: DISCONTINUED | OUTPATIENT
Start: 2022-02-16 | End: 2022-02-16 | Stop reason: HOSPADM

## 2022-02-16 RX ORDER — EPHEDRINE SULFATE 50 MG/ML
INJECTION, SOLUTION INTRAMUSCULAR; INTRAVENOUS; SUBCUTANEOUS PRN
Status: DISCONTINUED | OUTPATIENT
Start: 2022-02-16 | End: 2022-02-16

## 2022-02-16 RX ORDER — ONDANSETRON 4 MG/1
4 TABLET, ORALLY DISINTEGRATING ORAL EVERY 30 MIN PRN
Status: DISCONTINUED | OUTPATIENT
Start: 2022-02-16 | End: 2022-02-16 | Stop reason: HOSPADM

## 2022-02-16 RX ORDER — ALBUTEROL SULFATE 0.83 MG/ML
2.5 SOLUTION RESPIRATORY (INHALATION) EVERY 4 HOURS PRN
Status: DISCONTINUED | OUTPATIENT
Start: 2022-02-16 | End: 2022-02-16 | Stop reason: HOSPADM

## 2022-02-16 RX ORDER — FENTANYL CITRATE 50 UG/ML
50 INJECTION, SOLUTION INTRAMUSCULAR; INTRAVENOUS EVERY 5 MIN PRN
Status: DISCONTINUED | OUTPATIENT
Start: 2022-02-16 | End: 2022-02-16 | Stop reason: HOSPADM

## 2022-02-16 RX ORDER — PROPOFOL 10 MG/ML
INJECTION, EMULSION INTRAVENOUS PRN
Status: DISCONTINUED | OUTPATIENT
Start: 2022-02-16 | End: 2022-02-16

## 2022-02-16 RX ORDER — FENTANYL CITRATE 50 UG/ML
50 INJECTION, SOLUTION INTRAMUSCULAR; INTRAVENOUS
Status: DISCONTINUED | OUTPATIENT
Start: 2022-02-16 | End: 2022-02-16 | Stop reason: HOSPADM

## 2022-02-16 RX ADMIN — FENTANYL CITRATE 50 MCG: 50 INJECTION, SOLUTION INTRAMUSCULAR; INTRAVENOUS at 17:03

## 2022-02-16 RX ADMIN — OXYCODONE HYDROCHLORIDE 5 MG: 5 TABLET ORAL at 18:28

## 2022-02-16 RX ADMIN — FENTANYL CITRATE 50 MCG: 50 INJECTION, SOLUTION INTRAMUSCULAR; INTRAVENOUS at 17:23

## 2022-02-16 RX ADMIN — SUGAMMADEX 200 MG: 100 INJECTION, SOLUTION INTRAVENOUS at 17:35

## 2022-02-16 RX ADMIN — Medication 5 MG: at 17:23

## 2022-02-16 RX ADMIN — ROCURONIUM BROMIDE 50 MG: 50 INJECTION, SOLUTION INTRAVENOUS at 17:12

## 2022-02-16 RX ADMIN — Medication 10 MG: at 17:26

## 2022-02-16 RX ADMIN — PROPOFOL 200 MG: 10 INJECTION, EMULSION INTRAVENOUS at 17:06

## 2022-02-16 RX ADMIN — MIDAZOLAM 1 MG: 1 INJECTION INTRAMUSCULAR; INTRAVENOUS at 17:00

## 2022-02-16 RX ADMIN — MIDAZOLAM 1 MG: 1 INJECTION INTRAMUSCULAR; INTRAVENOUS at 17:12

## 2022-02-16 RX ADMIN — LIDOCAINE HYDROCHLORIDE 1 ML: 10 INJECTION, SOLUTION EPIDURAL; INFILTRATION; INTRACAUDAL; PERINEURAL at 15:17

## 2022-02-16 RX ADMIN — SODIUM CHLORIDE, POTASSIUM CHLORIDE, SODIUM LACTATE AND CALCIUM CHLORIDE: 600; 310; 30; 20 INJECTION, SOLUTION INTRAVENOUS at 15:17

## 2022-02-16 RX ADMIN — LIDOCAINE HYDROCHLORIDE 80 MG: 20 INJECTION, SOLUTION INFILTRATION; PERINEURAL at 17:06

## 2022-02-16 RX ADMIN — Medication 10 MG: at 17:25

## 2022-02-16 RX ADMIN — ONDANSETRON 4 MG: 2 INJECTION INTRAMUSCULAR; INTRAVENOUS at 17:29

## 2022-02-16 RX ADMIN — SUGAMMADEX 100 MG: 100 INJECTION, SOLUTION INTRAVENOUS at 17:39

## 2022-02-16 NOTE — ANESTHESIA PREPROCEDURE EVALUATION
Anesthesia Pre-Procedure Evaluation    Patient: Quan Murphy   MRN: [id number] : 1950        Preoperative Diagnosis: Bladder stones [N21.0]    Procedure : Procedure(s):  CYSTOSCOPY and bladder stone removal          Past Medical History:   Diagnosis Date     Allergic rhinitis      Allergy, unspecified not elsewhere classified     Seasonal allergies, pollen, dust, smoke and animals     Antiplatelet or antithrombotic long-term use      Anxiety      Arthritis      Chest pain      Chronic sinusitis      Coronary atherosclerosis of unspecified type of vessel, native or graft     Coronary artery disease     Depressive disorder      Gastroesophageal reflux disease     Cleared with medication     Head injury      Hiatal hernia 2015    Right Side     History of blood transfusion 12/15/2004    Prostate Surgery - My own blood     Hyperlipidemia      Hypertension      Inguinal hernia      Kidney problem 10/08/2017    Lithotripsy     Kidney stones      Malignant neoplasm of prostate (H)     Prostate cancer     Prostate cancer (H)      Syncope       Past Surgical History:   Procedure Laterality Date     ARTHRODESIS FOOT  2013    Procedure: ARTHRODESIS FOOT;  Great Toe Arthrodesis Left Foot;  Surgeon: Ash Gonzalez DPM;  [facility]     ARTHRODESIS FOOT  6/10/2014    Procedure: ARTHRODESIS FOOT;  Surgeon: Ash Gonzalez DPM;  [facility]     BIOPSY  10/1/2004    dermatologist biopsies     COLONOSCOPY  10/7/2013    Procedure: COLONOSCOPY;  Colonoscopy;  Surgeon: Mike Fallon MD;  Location:  GI     ESOPHAGOSCOPY, GASTROSCOPY, DUODENOSCOPY (EGD), COMBINED N/A 2020    Procedure: ESOPHAGOGASTRODUODENOSCOPY (EGD);  Surgeon: Sam Escobar MD;  Location:  GI     EXTRACORPOREAL SHOCK WAVE LITHOTRIPSY (ESWL) Bilateral 10/18/2017    Procedure: EXTRACORPOREAL SHOCK WAVE LITHOTRIPSY (ESWL);  BILATERAL EXTRACORPOREAL SHOCKWAVE LITHOTRIPSY ;  Surgeon: Meir Torres MD;   Location: [facility] OR     HC CORRECT BUNION,SIMPLE  08/11/2005    x3     HC REMV TOE BENIGN BONE LESN  08/11/2005     HEAD & NECK SURGERY  1954    From a fall     HERNIORRHAPHY INGUINAL  7/3/2013    Procedure: HERNIORRHAPHY INGUINAL;  Open Repair Inguinal hernia Right with mesh ;  Surgeon: Sam Escobar MD;  [facility]     IR GASTROSTOMY TUBE PERCUTANEOUS PLCMNT  6/7/2021     MOHS MICROGRAPHIC PROCEDURE  08/23/11    ear and chin-[facility]     OPEN REDUCTION INTERNAL FIXATION WRIST Right 7/18/2017    Procedure: OPEN REDUCTION INTERNAL FIXATION WRIST;  Right distal radius open reduction and internal fixation;  Surgeon: Pedro Blanca DO;  [facility]     RECONSTRUCT FOREFOOT WITH METATARSOPHALANGEAL (MTP) FUSION  6/10/2014    Procedure: RECONSTRUCT FOREFOOT WITH METATARSOPHALANGEAL (MTP) FUSION;  Surgeon: Ash Gonzalez DPM;  [facility]     STENT, CORONARY, DEMI       SURGICAL HISTORY OF -   1999/1974    lt knee     SURGICAL HISTORY OF -   10/2004    lithotripsy     SURGICAL HISTORY OF -   11/05    angiogram with stent     VASCULAR SURGERY  11/17/2005    Puncture of iliac artery during and angiogram     [facility] LAPAROSCOPY, SURGICAL PROSTATECTOMY, RETROPUBIC RADICAL, W/NERVE SPARING  11/30/2004    With full bilateral pelvic lymphadenectomy.  [facility].     [facility] TOTAL KNEE ARTHROPLASTY  05/01/08    Left knee      Allergies   Allergen Reactions     Animal Dander      Azithromycin Nausea and Vomiting     Dust Mites      Pollen Extract      Smoke.       Social History     Tobacco Use     Smoking status: Never Smoker     Smokeless tobacco: Never Used   Substance Use Topics     Alcohol use: Yes     Alcohol/week: 8.3 standard drinks     Types: 10 Cans of beer per week     Comment: occasional      Wt Readings from Last 1 Encounters:   02/10/22 91.2 kg (201 lb)        Anesthesia Evaluation            ROS/MED HX  ENT/Pulmonary: Comment: Chronic sinusitis     Squamous cell carcinoma of left tonsil      (+) sleep apnea,     Neurologic:       Cardiovascular: Comment: Coronary atherosclerosis    (+) Dyslipidemia hypertension--CAD --stent-1/5/2017. fainting (syncope). Previous cardiac testing   Echo: Date: Results:    Stress Test: Date: Results:    ECG Reviewed: Date: 2/10/2022 Results:  SR  Cath: Date: Results:   (-) taking anticoagulants/antiplatelets   METS/Exercise Tolerance:     Hematologic:       Musculoskeletal:   (+) arthritis,     GI/Hepatic:     (+) GERD, Asymptomatic on medication, hiatal hernia,     Renal/Genitourinary:     (+) renal disease, type: CRI, Pt does not require dialysis,     Endo:       Psychiatric/Substance Use:     (+) psychiatric history anxiety     Infectious Disease:       Malignancy:   (+) Malignancy, History of Prostate and Skin.Prostate CA Active status post Surgery.        Other:            Physical Exam    Airway  airway exam normal      Mallampati: II   TM distance: > 3 FB   Neck ROM: full   Mouth opening: > 3 cm    Respiratory Devices and Support         Dental  no notable dental history         Cardiovascular   cardiovascular exam normal       Rhythm and rate: regular and normal     Pulmonary   pulmonary exam normal        breath sounds clear to auscultation           OUTSIDE LABS:  CBC:   Lab Results   Component Value Date    WBC 4.4 11/15/2021    WBC 5.7 11/01/2021    HGB 15.3 11/15/2021    HGB 15.1 11/01/2021    HCT 47.3 11/15/2021    HCT 47.3 11/01/2021     (L) 11/15/2021     11/01/2021     BMP:   Lab Results   Component Value Date     11/15/2021     11/09/2021    POTASSIUM 4.3 11/15/2021    POTASSIUM 4.1 11/09/2021    CHLORIDE 104 11/15/2021    CHLORIDE 105 11/09/2021    CO2 30 11/15/2021    CO2 30 11/09/2021    BUN 27 11/15/2021    BUN 24 11/09/2021    CR 1.18 11/15/2021    CR 1.11 11/09/2021     (H) 11/15/2021     (H) 11/09/2021     COAGS:   Lab Results   Component Value Date    PTT 47 (H) 10/28/2017    INR 1.49 (H) 06/07/2021      POC:   Lab Results   Component Value Date    BGM 85 06/06/2021     HEPATIC:   Lab Results   Component Value Date    ALBUMIN 3.4 11/01/2021    PROTTOTAL 6.9 11/01/2021    ALT 66 11/15/2021    AST 34 11/01/2021    ALKPHOS 78 11/01/2021    BILITOTAL 0.7 11/01/2021     OTHER:   Lab Results   Component Value Date    PH 7.52 (H) 06/04/2021    LACT 2.8 (H) 06/04/2021    LATRELL 9.5 11/15/2021    PHOS 3.1 06/11/2021    MAG 2.3 11/15/2021    LIPASE 99 02/11/2020    TSH 1.20 11/01/2021    T4 0.40 (L) 09/03/2021    CRP 8.6 (H) 02/11/2020    SED 8 02/11/2020       Anesthesia Plan    ASA Status:  3   NPO Status:  NPO Appropriate    Anesthesia Type: General.     - Airway: LMA   Induction: Intravenous, Propofol.   Maintenance: Balanced.        Consents    Anesthesia Plan(s) and associated risks, benefits, and realistic alternatives discussed. Questions answered and patient/representative(s) expressed understanding.     - Discussed: Risks, Benefits and Alternatives for BOTH SEDATION and the PROCEDURE were discussed     - Discussed with:  Patient      - Extended Intubation/Ventilatory Support Discussed: No.      - Patient is DNR/DNI Status: No    Use of blood products discussed: No .     Postoperative Care    Pain management: Oral pain medications.   PONV prophylaxis: Ondansetron (or other 5HT-3)     Comments:    Other Comments: The risks and benefits of anesthesia, and the alternatives where applicable, have been discussed with the patient, and they wish to proceed.            MARCY Still CRNA

## 2022-02-16 NOTE — ANESTHESIA PROCEDURE NOTES
Airway       Patient location during procedure: OR       Procedure Start/Stop Times: 2/16/2022 5:11 PM  Staff -        CRNA: Delmi Bloom APRN CRNA       Performed By: CRNA  Consent for Airway        Urgency: elective  Indications and Patient Condition       Indications for airway management: mago-procedural       Induction type:intravenous       Mask difficulty assessment: 1 - vent by mask    Final Airway Details       Final airway type: endotracheal airway       Successful airway: ETT - single  Endotracheal Airway Details        Successful intubation technique: video laryngoscopy       VL Blade Size: Glidescope 4       Position: Right    Post intubation assessment        Placement verified by: capnometry, equal breath sounds and chest rise        Number of attempts at approach: 2       Number of other approaches attempted: 1       Secured with: plastic tape       Ease of procedure: easy       Dentition: Intact and Unchanged    Additional Comments       Unable to seat LMA properly, LMA DC'd.  Placed #7 ETT without difficulty with [place] scope.

## 2022-02-16 NOTE — OP NOTE
Preop diagnosis: Bladder stones    Postop diagnosis: Same    Procedure done: litholapaxy of 2 stones combined size greater than 2 cm    Procedure    Patient brought to the OR suite and placed in a dorsolithotomy decision.  After general anesthesia induced, he was draped and prepped in usual surgical fashion.  Patient received preop antibiotics.  A 23 [place] rigid cystoscope was then placed into the bladder.  The bladder stones were identified.  Using the stone  the stones were crushed into multiple small pieces and retrieved with a evacuator.  Patient tolerated procedure well.  No complications identified during procedure.  There was minimal bleeding during the operation.

## 2022-02-16 NOTE — ANESTHESIA CARE TRANSFER NOTE
Patient: Quan Murphy    Procedure: Procedure(s):  CYSTOSCOPY and bladder stone removal       Diagnosis: Bladder stones [N21.0]  Diagnosis Additional Information: No value filed.    Anesthesia Type:   General     Note:    Oropharynx: oropharynx clear of all foreign objects and spontaneously breathing  Level of Consciousness: drowsy  Oxygen Supplementation: face mask    Independent Airway: airway patency satisfactory and stable  Dentition: dentition unchanged  Vital Signs Stable: post-procedure vital signs reviewed and stable  Report to RN Given: handoff report given  Patient transferred to: PACU    Handoff Report: Identifed the Patient, Identified the Reponsible Provider, Reviewed the pertinent medical history, Discussed the surgical course, Reviewed Intra-OP anesthesia mangement and issues during anesthesia, Set expectations for post-procedure period and Allowed opportunity for questions and acknowledgement of understanding      Vitals:  Vitals Value Taken Time   /87 02/16/22 1751   Temp     Pulse 71 02/16/22 1754   Resp 12 02/16/22 1754   SpO2 100 % 02/16/22 1754   Vitals shown include unvalidated device data.    Electronically Signed By: MARCY Still CRNA  February 16, 2022  5:55 PM

## 2022-02-16 NOTE — BRIEF OP NOTE
[facility]    Brief Operative Note    Pre-operative diagnosis: Bladder stones [N21.0]  Post-operative diagnosis Same as pre-operative diagnosis    Procedure: Procedure(s):  CYSTOSCOPY and bladder stone removal  Surgeon: Surgeon(s) and Role:     * David Rogers MD - Primary  Anesthesia: General   Estimated Blood Loss: None    Drains: None  Specimens: * No specimens in log *  Findings:   None.  Complications: None.  Implants: * No implants in log *

## 2022-02-17 PROBLEM — R62.51 FAILURE TO THRIVE IN PEDIATRIC PATIENT: Status: ACTIVE | Noted: 2021-06-02

## 2022-02-17 NOTE — ANESTHESIA POSTPROCEDURE EVALUATION
Patient: Quan Murphy    Procedure: Procedure(s):  CYSTOSCOPY and bladder stone removal       Diagnosis:Bladder stones [N21.0]  Diagnosis Additional Information: No value filed.    Anesthesia Type:  General    Note:  Disposition: Outpatient   Postop Pain Control: Uneventful            Sign Out: Well controlled pain   PONV: No   Neuro/Psych: Uneventful            Sign Out: Acceptable/Baseline neuro status   Airway/Respiratory: Uneventful            Sign Out: Acceptable/Baseline resp. status   CV/Hemodynamics: Uneventful            Sign Out: Acceptable CV status   Other NRE: NONE   DID A NON-ROUTINE EVENT OCCUR? No    Event details/Postop Comments:  Pt was happy with anesthesia care.  No complications.  I will follow up with the pt if needed.           Last vitals:  Vitals Value Taken Time   /78 02/16/22 1810   Temp 97.7  F (36.5  C) 02/16/22 1814   Pulse 61 02/16/22 1814   Resp 25 02/16/22 1814   SpO2 100 % 02/16/22 1814   Vitals shown include unvalidated device data.    Electronically Signed By: MARCY Still CRNA  February 16, 2022  6:28 PM

## 2022-02-17 NOTE — PROGRESS NOTES
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 Staging evaluation revealed a 2.7 x 2.2 x 2.7 cm left tonsillar primary tumor with multiple involved cervical lymph nodes including a 1.8 cm left level 2/3 node, a 3.4 cm left level 4 node and a suspicious 1.4 cm contralateral right level 2 node. He received curative intent chemoradiotherapy with concurrent weekly cisplatin as described above.    Mr. Murphy returns to ENT multiD clinic today for a routine posttreatment disease surveillance visit. Overall, he reports that he is doing okay with increased mucus and plugging of his ear which he relates to a recent Covid infection that he is recovering from. He is eating a regular diet with the avoidance of some very dry foods due to ongoing treatment-induced xerostomia. He otherwise denies any odynophagia or dysphagia and reports that his taste continues to improve although is still suppressed compared to his pre-treatment baseline.    PHYSICAL EXAM:  Weight: 89.4 kg  BP: 101/65  Pulse: 75    General: Healthy-appearing 71-year-old gentleman seated comfortably in an examination chair no acute distress  HEENT: NC/AT. EOMI. EACs clear bilaterally. Small serous  effusion on the left with a normal-appearing TM on the right. No rhinorrhea or epistaxis. Mildly dry mucous membranes with tacky oral secretions. Scattered telangiectasias involving the oropharynx including the soft palate and posterior oropharyngeal wall. No visible oral cavity lesions. Palpation of the buccal mucosa, gingiva, floor mouth, oral tongue and bilateral tongue base reveal no nodularity, induration or masses.  Neck: Mild fibrosis of bilateral neck. No discrete cervical adenopathy palpated bilaterally.  Pulmonary: No wheezing or stridor respiratory distress  Skin: Scattered telangiectasias throughout the bilateral neck. No desquamation or ulceration.    Dr. Fang performed a flexible nasopharyngoscopy in clinic today. Please see her note for complete details regarding this procedure. Briefly, examination revealed post-treatment changes involving the oropharynx and supraglottic larynx without any evidence of recurrent disease.    LABS AND IMAGING:  No recent labs or imaging    IMPRESSION:   Mr. 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 He is 8 months out from the completion of treatment and remains clinically CHANDA.    PLAN:   1. Follow-up with Dr. Fang in 2 months and in multiD clinic in 4 months  2. Recheck TSH in 5/2022  3. CT neck and chest in 3/2022    Ahmet Robbins MD/PhD    Department of Radiation Oncology  [facility]

## 2022-02-17 NOTE — DISCHARGE INSTRUCTIONS
Discharge Instructions Following Cystoscopy   Dr. Rogers    Following your cystoscopy today, you may experience:  1. Small amounts of bleeding.  2. A mild burning sensation, especially with voiding for a day or two.    To relieve these symptoms:  1. Drink fluids to keep your urine clear.    2. Rest.    If you have any heavy bleeding, please call our office at [phone]   For post operative appointments please call [phone]    [facility] Same-Day Surgery Anesthesia  Adult Discharge Orders & Instructions     For 24 hours after surgery    1. Get plenty of rest.  A responsible adult must stay with you for at least 24 hours after you leave the hospital.   2. Do not drive or use heavy equipment.  If you have weakness or tingling, don't drive or use heavy equipment until this feeling goes away.  3. Do not drink alcohol.  4. Avoid strenuous or risky activities.  Ask for help when climbing stairs.   5. You may feel lightheaded.  If so, sit for a few minutes before standing.  Have someone help you get up.   6. You may have a slight fever. Call the doctor if your fever is over 100 F (37.7 C) (taken under the tongue) or lasts longer than 24 hours.  7. You may have a dry mouth, a sore throat, muscle aches or trouble sleeping.  These should go away after 24 hours.  8. Do not make important or legal decisions.  We don t expect you to have any problems from the surgery or treatment you had today. Just in case, here s what to do if you have pain, upset stomach (nausea), bleeding or infection:  Pain:  Take medicines your doctor has prescribed or over-the-counter medicine they have suggested. Resting and using ice packs can help, too. For surgery on an arm or leg, raise it on a pillow to ease swelling. Call your doctor if these methods don t work.  Copyright [employer], Licensed under [employer]  Upset stomach (nausea):  Take anti-nausea medicine approved by your doctor. Drink clear liquids like apple  juice, ginger ale, broth or 7-Up. Be sure to drink enough fluids. Rest can help, too. Move to normal foods when you re ready. Bleeding:  In the first 24 hours, you may see a little blood on your dressing, about the size of a quarter. You don t need to worry about this much blood, but if the blood spot keeps getting bigger:    Put pressure on the wound if you can, AND    Call your doctor.  Copyright [facility], Licensed under [employer]  Fever/Infection: Please call your doctor if you have any of these signs:    Redness    Swelling    Wound feels warm    Pain gets worse    Bad-smelling fluid leaks from wound    Fever or chills  Call your doctor for any of the followin.  It has been over 8 to 10 hours since surgery and you are still not able to urinate (pass water).    2.  Headache for over 24 hours.    3.  Numbness, tingling or weakness in your legs the day after surgery (if you had spinal anesthesia).    24 Hour Nurse advice line: [phone]

## 2022-02-20 ENCOUNTER — MYC REFILL (OUTPATIENT)
Dept: INTERNAL MEDICINE | Facility: CLINIC | Age: 72
End: 2022-02-20
Payer: MEDICARE

## 2022-02-20 DIAGNOSIS — F41.9 ANXIETY: ICD-10-CM

## 2022-02-21 RX ORDER — ALPRAZOLAM 0.5 MG
0.5 TABLET ORAL 2 TIMES DAILY PRN
Qty: 60 TABLET | Refills: 0 | Status: SHIPPED | OUTPATIENT
Start: 2022-02-21 | End: 2022-03-11

## 2022-02-21 NOTE — TELEPHONE ENCOUNTER
Requested Prescriptions   Pending Prescriptions Disp Refills     ALPRAZolam (XANAX) 0.5 MG tablet 60 tablet 0     Sig: Take 1 tablet (0.5 mg) by mouth 2 times daily as needed for anxiety        Last Written Prescription Date:  01/25/2022  Last Fill Quantity: 60,   # refills: 0  Last Office Visit: 02/10/2022  Future Office visit:       Routing refill request to provider for review/approval because:  Drug not on the [facility] or [facility] refill protocol or controlled substance

## 2022-02-22 ENCOUNTER — MYC MEDICAL ADVICE (OUTPATIENT)
Dept: OTOLARYNGOLOGY | Facility: CLINIC | Age: 72
End: 2022-02-22
Payer: MEDICARE

## 2022-02-22 ENCOUNTER — MYC MEDICAL ADVICE (OUTPATIENT)
Dept: CARDIOLOGY | Facility: CLINIC | Age: 72
End: 2022-02-22
Payer: MEDICARE

## 2022-02-22 DIAGNOSIS — R07.9 CHEST PAIN: ICD-10-CM

## 2022-02-22 DIAGNOSIS — I25.750 CORONARY ARTERY DISEASE INVOLVING NATIVE ARTERY OF TRANSPLANTED HEART WITH UNSTABLE ANGINA PECTORIS (H): Primary | ICD-10-CM

## 2022-02-22 DIAGNOSIS — E03.4 HYPOTHYROIDISM DUE TO ACQUIRED ATROPHY OF THYROID: ICD-10-CM

## 2022-02-24 NOTE — TELEPHONE ENCOUNTER
reviewed pt's my chart messages regarding his chest discomfort symptoms and recent EKG.    Aramis Ch MD Sletteboe, Erin B., RN; P [facility] Heart Team 7  I reviewed the ECG's. Minor diffuse T wave abnormality. Basically normal ECG from 2/10/22. No significant changes from his previous ECG's from May and June 2021. But I am still concerned about the chest pain symptoms. He hasn't had a stress test since his LAD stent in 2016. The last stress test was 2005. I think we have better technology since then. I would recommend a stress test because chest pain with emotional stress is a common sign of worsening artery blockage. This doesn't mean he will need an angiogram, but it will tell us if there is a serious problem while we can still fix it.   EE       I sent pt a my chart update with 's recommendations. I will wait to hear back from pt regarding whether he would like to proceed with a stress test at this time. Nettie MIRANDA February 24, 2022, 9:51 AM

## 2022-02-24 NOTE — TELEPHONE ENCOUNTER
Writer called patient to clarify MyChart message. LM to call back with direct number.    Susy Cpoe RN on 2/24/2022 at 8:42 AM

## 2022-02-25 ENCOUNTER — TELEPHONE (OUTPATIENT)
Dept: UROLOGY | Facility: CLINIC | Age: 72
End: 2022-02-25
Payer: MEDICARE

## 2022-02-25 ENCOUNTER — TELEPHONE (OUTPATIENT)
Dept: FAMILY MEDICINE | Facility: CLINIC | Age: 72
End: 2022-02-25
Payer: MEDICARE

## 2022-02-25 RX ORDER — LEVOTHYROXINE SODIUM 137 UG/1
137 TABLET ORAL DAILY
Qty: 30 TABLET | Refills: 11 | Status: SHIPPED | OUTPATIENT
Start: 2022-02-25 | End: 2022-06-02 | Stop reason: DRUGHIGH

## 2022-02-25 NOTE — TELEPHONE ENCOUNTER
"Pt states he is out of Levothyroxine, and has this taken care. Pt states, \"on the 2/2/2022 visit with Dr. Rogers Levothyroxine  was discontinued and this must of been a clerical error. I have since called my doctor at the [facility] and they have ordered a new prescription for me. This is all taken care of, thank you for calling me back.\"    At this time no further follow-up is needed. Closing encounter.    Anita VERMA RN Urology 2/25/2022 2:07 PM    "

## 2022-02-25 NOTE — TELEPHONE ENCOUNTER
Reason for Call:  Other call back    Detailed comments: Patient states Dr. Rogers stopped a medication and New has questions about it. He also sent a mychart.  Please call    Phone Number Patient can be reached at: Home number on file [phone] (home) or Cell number on file:    Telephone Information:   Mobile [phone]       Best Time: any    Can we leave a detailed message on this number? YES    Call taken on 2/25/2022 at 9:38 AM by Jennifer Carter

## 2022-02-25 NOTE — TELEPHONE ENCOUNTER
Signed Prescriptions:                        Disp   Refills    levothyroxine (SYNTHROID/LEVOTHROID) 137 M*30 tab*11       Sig: Take 1 tablet (137 mcg) by mouth daily  Authorizing Provider: MAGO TURNER  Ordering User: ADILIA EVANS, RN on 2/25/2022 at 12:45 PM

## 2022-02-25 NOTE — TELEPHONE ENCOUNTER
Patient returned call, silvio explained our reasoning for reaching out to Dr. Rogers to follow-up on if there was a concern with kidney function and taking the medication.     I also sent a message to Dr. Fang for Ok to refill.     Awaiting patient response from Dr. Rogers and Dr. Fang.     Susy Cope, RN on 2/25/2022 at 9:34 AM

## 2022-02-25 NOTE — TELEPHONE ENCOUNTER
Message given per Dr Hernandez:    Tell him he can take up to 3 a day but should not do this for long and only if needed and maybe he can make this prescription last by going to just 1 a day down the road for a while     - patient agrees with plan.    Bri Oshea XRO/

## 2022-02-26 ENCOUNTER — HOSPITAL ENCOUNTER (EMERGENCY)
Facility: CLINIC | Age: 72
Discharge: HOME | End: 2022-02-26
Attending: PHYSICIAN ASSISTANT | Admitting: PHYSICIAN ASSISTANT
Payer: MEDICARE

## 2022-02-26 VITALS
SYSTOLIC BLOOD PRESSURE: 127 MMHG | HEART RATE: 64 BPM | DIASTOLIC BLOOD PRESSURE: 91 MMHG | OXYGEN SATURATION: 97 % | TEMPERATURE: 98.1 F | RESPIRATION RATE: 18 BRPM

## 2022-02-26 DIAGNOSIS — R07.9 CHEST PAIN: ICD-10-CM

## 2022-02-26 DIAGNOSIS — F41.9 ANXIETY: ICD-10-CM

## 2022-02-26 LAB
ANION GAP SERPL CALCULATED.3IONS-SCNC: 8 MMOL/L (ref 3–14)
BASOPHILS # BLD AUTO: 0 10E3/UL (ref 0–0.2)
BASOPHILS NFR BLD AUTO: 1 %
BUN SERPL-MCNC: 15 MG/DL (ref 7–30)
CALCIUM SERPL-MCNC: 9.2 MG/DL (ref 8.5–10.1)
CHLORIDE BLD-SCNC: 106 MMOL/L (ref 94–109)
CO2 SERPL-SCNC: 24 MMOL/L (ref 20–32)
CREAT SERPL-MCNC: 1.04 MG/DL (ref 0.66–1.25)
EOSINOPHIL # BLD AUTO: 0 10E3/UL (ref 0–0.7)
EOSINOPHIL NFR BLD AUTO: 1 %
ERYTHROCYTE [DISTWIDTH] IN BLOOD BY AUTOMATED COUNT: 13.3 % (ref 10–15)
GFR SERPL CREATININE-BSD FRML MDRD: 77 ML/MIN/1.73M2
GLUCOSE BLD-MCNC: 98 MG/DL (ref 70–99)
HCT VFR BLD AUTO: 36.6 % (ref 40–53)
HGB BLD-MCNC: 12.5 G/DL (ref 13.3–17.7)
HOLD SPECIMEN: NORMAL
IMM GRANULOCYTES # BLD: 0 10E3/UL
IMM GRANULOCYTES NFR BLD: 0 %
LYMPHOCYTES # BLD AUTO: 0.7 10E3/UL (ref 0.8–5.3)
LYMPHOCYTES NFR BLD AUTO: 20 %
MCH RBC QN AUTO: 29.8 PG (ref 26.5–33)
MCHC RBC AUTO-ENTMCNC: 34.2 G/DL (ref 31.5–36.5)
MCV RBC AUTO: 87 FL (ref 78–100)
MONOCYTES # BLD AUTO: 0.4 10E3/UL (ref 0–1.3)
MONOCYTES NFR BLD AUTO: 11 %
NEUTROPHILS # BLD AUTO: 2.3 10E3/UL (ref 1.6–8.3)
NEUTROPHILS NFR BLD AUTO: 67 %
NRBC # BLD AUTO: 0 10E3/UL
NRBC BLD AUTO-RTO: 0 /100
PLATELET # BLD AUTO: 163 10E3/UL (ref 150–450)
POTASSIUM BLD-SCNC: 4 MMOL/L (ref 3.4–5.3)
RBC # BLD AUTO: 4.2 10E6/UL (ref 4.4–5.9)
SODIUM SERPL-SCNC: 138 MMOL/L (ref 133–144)
TROPONIN I SERPL HS-MCNC: 5 NG/L
TROPONIN I SERPL HS-MCNC: 6 NG/L
WBC # BLD AUTO: 3.4 10E3/UL (ref 4–11)

## 2022-02-26 PROCEDURE — 80048 BASIC METABOLIC PNL TOTAL CA: CPT | Performed by: PHYSICIAN ASSISTANT

## 2022-02-26 PROCEDURE — 36415 COLL VENOUS BLD VENIPUNCTURE: CPT | Performed by: PHYSICIAN ASSISTANT

## 2022-02-26 PROCEDURE — 84484 ASSAY OF TROPONIN QUANT: CPT | Performed by: PHYSICIAN ASSISTANT

## 2022-02-26 PROCEDURE — 85025 COMPLETE CBC W/AUTO DIFF WBC: CPT | Performed by: PHYSICIAN ASSISTANT

## 2022-02-26 PROCEDURE — 250N000013 HC RX MED GY IP 250 OP 250 PS 637: Performed by: PHYSICIAN ASSISTANT

## 2022-02-26 PROCEDURE — 99284 EMERGENCY DEPT VISIT MOD MDM: CPT | Performed by: PHYSICIAN ASSISTANT

## 2022-02-26 PROCEDURE — 99284 EMERGENCY DEPT VISIT MOD MDM: CPT | Mod: 25 | Performed by: PHYSICIAN ASSISTANT

## 2022-02-26 PROCEDURE — 93010 ELECTROCARDIOGRAM REPORT: CPT | Performed by: PHYSICIAN ASSISTANT

## 2022-02-26 PROCEDURE — 93005 ELECTROCARDIOGRAM TRACING: CPT | Performed by: PHYSICIAN ASSISTANT

## 2022-02-26 RX ORDER — LORAZEPAM 0.5 MG/1
0.5 TABLET ORAL EVERY 8 HOURS PRN
Qty: 8 TABLET | Refills: 0 | Status: SHIPPED | OUTPATIENT
Start: 2022-02-26 | End: 2022-04-21 | Stop reason: ALTCHOICE

## 2022-02-26 RX ORDER — ALPRAZOLAM 0.5 MG
0.5 TABLET ORAL ONCE
Status: DISCONTINUED | OUTPATIENT
Start: 2022-02-26 | End: 2022-02-26

## 2022-02-26 RX ORDER — LORAZEPAM 1 MG/1
1 TABLET ORAL ONCE
Status: COMPLETED | OUTPATIENT
Start: 2022-02-26 | End: 2022-02-26

## 2022-02-26 RX ADMIN — LORAZEPAM 1 MG: 1 TABLET ORAL at 12:37

## 2022-02-26 NOTE — ED TRIAGE NOTES
Pt presents with chest pain and pain that is radiating to the left arm and jaw. Pt is a bit short of breath. History of stents.  Pain intermittent.

## 2022-02-26 NOTE — ED PROVIDER NOTES
"  History     Chief Complaint   Patient presents with     Chest Pain       HPI  Quan Murphy is a 71 year old male with a history of CAD s/p stenting, chronic kidney disease,  who presents to the emergency department complaining of chest pain. The patient reports he has had intermittent left-sided chest pain for a while now.  He describes it as a burning sensation in his left lower chest that occasionally radiates through his collarbone down his left jaw.  He denies associated shortness of breath, nausea, lightheadedness.  He has gotten sweaty at times.  Yesterday he was going to go up to [employer] to see family but had to turn around at [place] due to chest pain and he was sweaty at that time.  He took 2 nitro which helped but then it gave him a headache.  Currently he denies any symptoms.  He takes a full dose aspirin in the morning.  He denies recent illnesses or fevers.  No leg swelling.  He has a stress test scheduled for Tuesday, 3 days from now, and he reports it has been \"weighing on his mind\" for the last week.  He reports increased amount of stress lately in general and the stress test is making him more anxious.    Per chart review he actually called his clinic provider yesterday asking if he could increase his Xanax up to 3/day from 2/day.        Allergies:  Allergies   Allergen Reactions     Animal Dander      Azithromycin Nausea and Vomiting     Dust Mites      Pollen Extract      Smoke.        Problem List:    Patient Active Problem List    Diagnosis Date Noted     Chronic kidney disease, stage 3 (H) 06/15/2021     Priority: Medium     Failure to thrive (0-17) 06/02/2021     Priority: Medium     Replacing diagnoses that were inactivated after the 10/1/2021 regulatory import.       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     Priority: Medium     Complete prior to 3.16 abner Beard SURV       Hypomagnesemia 04/13/2021     Priority: Medium     Hiatal hernia 02/17/2020     Priority: " Medium     Renal hematoma 10/28/2017     Priority: Medium     Retroperitoneal hematoma 10/28/2017     Priority: Medium     Syncope 10/18/2017     Priority: Medium     S/P ORIF (open reduction internal fixation) fracture 08/03/2017     Priority: Medium     Closed Colles' fracture of right radius with routine healing, subsequent encounter 08/03/2017     Priority: Medium     Status post insertion of drug-eluting stent into left anterior descending artery for coronary artery disease 01/05/2017     Priority: Medium     Chest pain 12/24/2016     Priority: Medium     Arthropathy 02/04/2014     Priority: Medium     Problem list name updated by automated process. Provider to review       Coronary atherosclerosis      Priority: Medium     Coronary artery disease  Problem list name updated by automated process. Provider to review       Hallux rigidus 07/16/2013     Priority: Medium     Inguinal hernia 06/28/2013     Priority: Medium     Degenerative arthritis of foot 06/28/2013     Priority: Medium     Advanced directives, counseling/discussion 05/24/2013     Priority: Medium     Advance Care Planning:   Receipt of ACP document:  Received: Health Care Directive which was witnessed or notarized on 8-18-08.  Document previously scanned on 7-8-13.  Validation form completed and sent to be scanned.  Code Status needs to be updated to reflect choices in most recent ACP document. Confirmed/documented designated decision maker(s). See permanent comments section of demographics in clinical tab. View document(s) and details by clicking on code status.   Added by Vandana Platt RN, System ACP Coordinator on 7/22/2013.    Advance Care Planning:   ACP Review and Resources Provided:  Reviewed chart for advance care plan.  Quan Murphy has no plan or code status on file however states presence of ACP document. Copy requested. Confirmed code status reflects current choices pending receipt of document/advance care plan review.  Confirmed/documented designated decision maker(s). See permanent comments section of demographics in clinical tab.   Added by Krysten Jasmine on 5/24/2013             Hypertension goal BP (blood pressure) < 140/90 06/12/2012     Priority: Medium     Hyperlipidemia LDL goal <130 12/22/2011     Priority: Medium     Anxiety 11/02/2011     Priority: Medium     Allergic conjunctivitis 07/08/2008     Priority: Medium     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, insomnia type 11/17/2006     Priority: Medium     Problem list name updated by automated process. Provider to review       Acute reaction to stress 01/12/2005     Priority: Medium     Problem list name updated by automated process. Provider to review       Chronic maxillary sinusitis 01/12/2005     Priority: Medium     Contracture of palmar fascia 01/12/2005     Priority: Medium     Benign neoplasm of skin of other and unspecified parts of face 01/12/2005     Priority: Medium     Malignant neoplasm of prostate (H) 11/26/2004     Priority: Medium        Past Medical History:    Past Medical History:   Diagnosis Date     Allergic rhinitis      Allergy, unspecified not elsewhere classified      Antiplatelet or antithrombotic long-term use      Anxiety      Arthritis      Chest pain      Chronic sinusitis      Coronary atherosclerosis of unspecified type of vessel, native or graft      Depressive disorder 1995     Gastroesophageal reflux disease 2020     Head injury 1954     Hiatal hernia 2015     History of blood transfusion 12/15/2004     Hyperlipidemia      Hypertension      Inguinal hernia      Kidney problem 10/08/2017     Kidney stones      Malignant neoplasm of prostate (H)      Prostate cancer (H)      Syncope        Past Surgical History:    Past Surgical History:   Procedure Laterality Date     ARTHRODESIS FOOT  7/23/2013    Procedure: ARTHRODESIS FOOT;  Great Toe Arthrodesis Left Foot;  Surgeon: Ash Gonzalez DPM;  Location: [facility]     ARTHRODESIS  FOOT  6/10/2014    Procedure: ARTHRODESIS FOOT;  Surgeon: Ash Gonzalez DPM;  Location: [facility]     BIOPSY  10/1/2004    dermatologist biopsies     COLONOSCOPY  10/7/2013    Procedure: COLONOSCOPY;  Colonoscopy;  Surgeon: Mike Fallon MD;  Location: [facility]     CYSTOSCOPY N/A 2/16/2022    Procedure: CYSTOSCOPY and bladder stone removal;  Surgeon: David Rogers MD;  Location[facility]     ESOPHAGOSCOPY, GASTROSCOPY, DUODENOSCOPY (EGD), COMBINED N/A 2/12/2020    Procedure: ESOPHAGOGASTRODUODENOSCOPY (EGD);  Surgeon: Sam Escobar MD;  Location[facility]     EXTRACORPOREAL SHOCK WAVE LITHOTRIPSY (ESWL) Bilateral 10/18/2017    Procedure: EXTRACORPOREAL SHOCK WAVE LITHOTRIPSY (ESWL);  BILATERAL EXTRACORPOREAL SHOCKWAVE LITHOTRIPSY ;  Surgeon: Meir Torres MD;  Location: [facility] OR     HC CORRECT BUNION,SIMPLE  08/11/2005    x3     HC REMV TOE BENIGN BONE LESN  08/11/2005     HEAD & NECK SURGERY  1954    From a fall     HERNIORRHAPHY INGUINAL  7/3/2013    Procedure: HERNIORRHAPHY INGUINAL;  Open Repair Inguinal hernia Right with mesh ;  Surgeon: Sam Escobar MD;  Location: [facility]     IR GASTROSTOMY TUBE PERCUTANEOUS PLCMNT  6/7/2021     MOHS MICROGRAPHIC PROCEDURE  08/23/11    [facility]     OPEN REDUCTION INTERNAL FIXATION WRIST Right 7/18/2017    Procedure: OPEN REDUCTION INTERNAL FIXATION WRIST;  Right distal radius open reduction and internal fixation;  Surgeon: Pedro Blanca DO;  Location: [facility]     RECONSTRUCT FOREFOOT WITH METATARSOPHALANGEAL (MTP) FUSION  6/10/2014    Procedure: RECONSTRUCT FOREFOOT WITH METATARSOPHALANGEAL (MTP) FUSION;  Surgeon: Ash Gonzalez DPM;  Location: [facility]     STENT, CORONARY, DEMI       SURGICAL HISTORY OF -   1999/1974    lt knee     SURGICAL HISTORY OF -   10/2004    lithotripsy     SURGICAL HISTORY OF -   11/05    angiogram with stent     VASCULAR SURGERY  11/17/2005    Puncture of iliac artery during and angiogram     [facility]  LAPAROSCOPY, SURGICAL PROSTATECTOMY, RETROPUBIC RADICAL, W/NERVE SPARING  11/30/2004    With full bilateral pelvic lymphadenectomy.  [facility].     ZZC TOTAL KNEE ARTHROPLASTY  05/01/08    Left knee       Family History:    Family History   Problem Relation Age of Onset     Hypertension Father         Lived to age 87     Connective Tissue Disorder Mother         LUPUS     Heart Disease Mother         Valve replacement     Anxiety Disorder Mother         Lived to age 84     Dementia Mother         Nursing Home (lived to age 86)       Social History:  Marital Status:   [2]  Social History     Tobacco Use     Smoking status: Never Smoker     Smokeless tobacco: Never Used   Vaping Use     Vaping Use: Never used   Substance Use Topics     Alcohol use: Yes     Alcohol/week: 8.3 standard drinks     Types: 10 Cans of beer per week     Comment: occasional     Drug use: No        Medications:    LORazepam (ATIVAN) 0.5 MG tablet  ALPRAZolam (XANAX) 0.5 MG tablet  cetirizine (ZYRTEC) 10 MG tablet  cevimeline (EVOXAC) 30 MG capsule  citalopram (CELEXA) 40 MG tablet  dronabinol (MARINOL) 2.5 MG capsule  HYDROcodone-acetaminophen (NORCO)  MG per tablet  HYDROcodone-acetaminophen (NORCO) 5-325 MG tablet  hydrOXYzine (VISTARIL) 25 MG capsule  levothyroxine (SYNTHROID/LEVOTHROID) 137 MCG tablet  metoprolol succinate ER (TOPROL-XL) 50 MG 24 hr tablet  nitroGLYcerin (NITROSTAT) 0.4 MG sublingual tablet  omeprazole (PRILOSEC) 40 MG DR capsule  pilocarpine (SALAGEN) 5 MG tablet  rosuvastatin (CRESTOR) 40 MG tablet  zolpidem ER (AMBIEN CR) 12.5 MG CR tablet          Review of Systems   All other systems reviewed and are negative.      Physical Exam   BP: (!) 142/89  Pulse: 66  Temp: 98.1  F (36.7  C)  Resp: 20  SpO2: 99 %      Physical Exam  Vitals and nursing note reviewed.   Constitutional:       General: He is not in acute distress.     Appearance: He is well-developed. He is not diaphoretic.   HENT:      Head: Normocephalic  and atraumatic.      Nose: Nose normal.   Eyes:      Conjunctiva/sclera: Conjunctivae normal.      Pupils: Pupils are equal, round, and reactive to light.   Cardiovascular:      Rate and Rhythm: Normal rate and regular rhythm.      Heart sounds: Normal heart sounds.   Pulmonary:      Effort: Pulmonary effort is normal. No respiratory distress.      Breath sounds: Normal breath sounds.   Abdominal:      General: Bowel sounds are normal. There is no distension.      Palpations: Abdomen is soft.      Tenderness: There is no abdominal tenderness.   Musculoskeletal:         General: No deformity.      Cervical back: Neck supple.      Right lower leg: No edema.      Left lower leg: No edema.   Skin:     General: Skin is warm and dry.   Neurological:      General: No focal deficit present.      Mental Status: He is alert and oriented to person, place, and time. Mental status is at baseline.      Coordination: Coordination normal.   Psychiatric:         Mood and Affect: Mood is anxious and depressed.         Behavior: Behavior normal.         ED Course             Procedures              EKG Interpretation:      Interpreted by Joelle Chawla PA-C  Time reviewed: 1220  Symptoms at time of EKG: none, hx of intermittent chest pain   Rhythm: normal sinus   Rate: normal  Axis: normal  Ectopy: none  Conduction: normal  ST Segments/ T Waves: No ST-T wave changes  Q Waves: none  Comparison to prior: Unchanged     Clinical Impression: normal EKG      Results for orders placed or performed during the hospital encounter of 02/26/22 (from the past 24 hour(s))   CBC with platelets differential    Narrative    The following orders were created for panel order CBC with platelets differential.  Procedure                               Abnormality         Status                     ---------                               -----------         ------                     CBC with platelets and d...[[id number]]  Abnormal            Final  result                 Please view results for these tests on the individual orders.   Basic metabolic panel   Result Value Ref Range    Sodium 138 133 - 144 mmol/L    Potassium 4.0 3.4 - 5.3 mmol/L    Chloride 106 94 - 109 mmol/L    Carbon Dioxide (CO2) 24 20 - 32 mmol/L    Anion Gap 8 3 - 14 mmol/L    Urea Nitrogen 15 7 - 30 mg/dL    Creatinine 1.04 0.66 - 1.25 mg/dL    Calcium 9.2 8.5 - 10.1 mg/dL    Glucose 98 70 - 99 mg/dL    GFR Estimate 77 >60 mL/min/1.73m2   Troponin I   Result Value Ref Range    Troponin I High Sensitivity 6 <79 ng/L   CBC with platelets and differential   Result Value Ref Range    WBC Count 3.4 (L) 4.0 - 11.0 10e3/uL    RBC Count 4.20 (L) 4.40 - 5.90 10e6/uL    Hemoglobin 12.5 (L) 13.3 - 17.7 g/dL    Hematocrit 36.6 (L) 40.0 - 53.0 %    MCV 87 78 - 100 fL    MCH 29.8 26.5 - 33.0 pg    MCHC 34.2 31.5 - 36.5 g/dL    RDW 13.3 10.0 - 15.0 %    Platelet Count 163 150 - 450 10e3/uL    % Neutrophils 67 %    % Lymphocytes 20 %    % Monocytes 11 %    % Eosinophils 1 %    % Basophils 1 %    % Immature Granulocytes 0 %    NRBCs per 100 WBC 0 <1 /100    Absolute Neutrophils 2.3 1.6 - 8.3 10e3/uL    Absolute Lymphocytes 0.7 (L) 0.8 - 5.3 10e3/uL    Absolute Monocytes 0.4 0.0 - 1.3 10e3/uL    Absolute Eosinophils 0.0 0.0 - 0.7 10e3/uL    Absolute Basophils 0.0 0.0 - 0.2 10e3/uL    Absolute Immature Granulocytes 0.0 <=0.4 10e3/uL    Absolute NRBCs 0.0 10e3/uL   Troponin I   Result Value Ref Range    Troponin I High Sensitivity 5 <79 ng/L     *Note: Due to a large number of results and/or encounters for the requested time period, some results have not been displayed. A complete set of results can be found in Results Review.       Medications   LORazepam (ATIVAN) tablet 1 mg (1 mg Oral Given 2/26/22 1237)         Assessments & Plan (with Medical Decision Making)  Quan GANT Jeffrey is a 71 year old male who presented to the ED complaining of chest pain.  This has been an ongoing issue for him for a while but  in the last few days it has gotten worse.  Pain radiates into his collarbone and left arm.  Denies associated shortness of breath, nausea, or lightheadedness.  Has gotten clammy at times.  Reports increased stress.  Has a stress test scheduled in 3 days.  On arrival to the ED he was mildly hypertensive but otherwise had normal vital signs.  Exam today demonstrated no acute abnormalities.  He had already taken full dose aspirin today so no aspirin given and since he was not having any chest pain here no need for nitro.  He did seem quite anxious or so he received a dose of Ativan which she reported helped things greatly.  EKG today showed a sinus rhythm without signs of ischemia.  Initial high-sensitivity troponin was normal at 6.  CBC showed mildly low white count of 3.4 and hemoglobin of 12.5.  His BMP was unremarkable.  Based on high-sensitivity troponin pathway, we did recheck his troponin to ensure no significant change 2 hours after the initial and it was stable at 5.  I discussed these results with the patient and he was very reassured.  Question if there is some component of anxiety to his symptoms.  He admits that he has been perseverating on the upcoming stress test and is very worried he may need another angiogram.  We discussed ways to cope with anxiety and I will give him a few tablets of Ativan since it helped here to get him through the next couple days.  Otherwise no signs of acute coronary syndrome at this time.  He was advised to return to the ED if he does develop any worsening symptoms.  All questions were answered and patient was discharged home in suitable condition.     I have reviewed the nursing notes.    I have reviewed the findings, diagnosis, plan and need for follow up with the patient.    New Prescriptions    LORAZEPAM (ATIVAN) 0.5 MG TABLET    Take 1 tablet (0.5 mg) by mouth every 8 hours as needed for anxiety       Final diagnoses:   Chest pain   Anxiety     Note: Chart documentation  done in part with Dragon Voice Recognition software. Although reviewed after completion, some word and grammatical errors may remain.     2/26/2022   [facility] EMERGENCY DEPT     Joelle Chawla PA-C  02/26/22 7508

## 2022-02-26 NOTE — DISCHARGE INSTRUCTIONS
Your cardiac work-up today was very reassuring.  There are no signs that you are having a heart attack today.  I think it is very reasonable to go home and wait for your stress test on Tuesday to look at things further.  Try to take it easy and practice some things that will help you relax.  Use the Ativan sparingly as needed if you get too anxious.  If you have any worsening concerns please return to the emergency department.    Thank you for choosing [facility]'s Emergency Department. It was a pleasure taking care of you today. If you have any questions, please call [phone].    Joelle Chawla PA-C

## 2022-03-01 ENCOUNTER — HOSPITAL ENCOUNTER (OUTPATIENT)
Dept: NUCLEAR MEDICINE | Facility: CLINIC | Age: 72
Setting detail: NUCLEAR MEDICINE
End: 2022-03-01
Attending: INTERNAL MEDICINE
Payer: MEDICARE

## 2022-03-01 ENCOUNTER — HOSPITAL ENCOUNTER (OUTPATIENT)
Dept: CARDIOLOGY | Facility: CLINIC | Age: 72
End: 2022-03-01
Attending: INTERNAL MEDICINE
Payer: MEDICARE

## 2022-03-01 DIAGNOSIS — I25.750 CORONARY ARTERY DISEASE INVOLVING NATIVE ARTERY OF TRANSPLANTED HEART WITH UNSTABLE ANGINA PECTORIS (H): ICD-10-CM

## 2022-03-01 DIAGNOSIS — R07.9 CHEST PAIN: ICD-10-CM

## 2022-03-01 LAB
CV STRESS MAX HR HE: 126
NUC STRESS EJECTION FRACTION: 61 %
RATE PRESSURE PRODUCT: NORMAL
STRESS ANGINA INDEX: 0
STRESS ECHO BASELINE DIASTOLIC HE: 76
STRESS ECHO BASELINE HR: 81 BPM
STRESS ECHO BASELINE SYSTOLIC BP: 118
STRESS ECHO CALCULATED PERCENT HR: 84 %
STRESS ECHO LAST STRESS DIASTOLIC BP: 90
STRESS ECHO LAST STRESS SYSTOLIC BP: 189
STRESS ECHO POST ESTIMATED WORKLOAD: 10 METS
STRESS ECHO POST EXERCISE DUR MIN: 9 MIN
STRESS ECHO POST EXERCISE DUR SEC: 35 SEC
STRESS ECHO TARGET HR: 150

## 2022-03-01 PROCEDURE — 93018 CV STRESS TEST I&R ONLY: CPT | Performed by: INTERNAL MEDICINE

## 2022-03-01 PROCEDURE — A9502 TC99M TETROFOSMIN: HCPCS | Performed by: INTERNAL MEDICINE

## 2022-03-01 PROCEDURE — G1004 CDSM NDSC: HCPCS | Performed by: INTERNAL MEDICINE

## 2022-03-01 PROCEDURE — 93017 CV STRESS TEST TRACING ONLY: CPT

## 2022-03-01 PROCEDURE — 93016 CV STRESS TEST SUPVJ ONLY: CPT | Performed by: INTERNAL MEDICINE

## 2022-03-01 PROCEDURE — 343N000001 HC RX 343: Performed by: INTERNAL MEDICINE

## 2022-03-01 PROCEDURE — 78452 HT MUSCLE IMAGE SPECT MULT: CPT | Mod: 26 | Performed by: INTERNAL MEDICINE

## 2022-03-01 PROCEDURE — G1004 CDSM NDSC: HCPCS

## 2022-03-01 RX ADMIN — TETROFOSMIN 31.8 MCI.: 1.38 INJECTION, POWDER, LYOPHILIZED, FOR SOLUTION INTRAVENOUS at 13:30

## 2022-03-01 RX ADMIN — TETROFOSMIN 10.7 MCI.: 1.38 INJECTION, POWDER, LYOPHILIZED, FOR SOLUTION INTRAVENOUS at 12:00

## 2022-03-01 NOTE — TELEPHONE ENCOUNTER
Call out to pt to review Nuc Stress Test results and also because I noted pt was seen in the [facility] on 2/26/22 for chest pain. ACS was r/o as troponin and EKG were negative. New states he went to the ED because he was sitting at rest thinking and had onset of chest pain. He states he has a lot of family stress right now and feels reassured that his ED evaluation turned out ok the other day. New has an RIVERA OV scheduled in a couple of weeks which he will keep to discuss his symptoms and stress test further. I told him the test states it's probably negative for ischemia and his pumping function was reported as normal so that's reassuring but did tell him it's not 100% specific so if he has recurrent chest pain that persists he should always be evaluated in the ED. Pt verbalized understanding. Judy Ramírez RN on 3/1/2022 at 3:44 PM

## 2022-03-11 ENCOUNTER — MYC REFILL (OUTPATIENT)
Dept: INTERNAL MEDICINE | Facility: CLINIC | Age: 72
End: 2022-03-11
Payer: MEDICARE

## 2022-03-11 DIAGNOSIS — F41.9 ANXIETY: ICD-10-CM

## 2022-03-11 RX ORDER — ALPRAZOLAM 0.5 MG
0.5 TABLET ORAL 2 TIMES DAILY PRN
Qty: 60 TABLET | Refills: 0 | Status: SHIPPED | OUTPATIENT
Start: 2022-03-11 | End: 2022-04-10

## 2022-03-13 ENCOUNTER — MYC MEDICAL ADVICE (OUTPATIENT)
Dept: FAMILY MEDICINE | Facility: CLINIC | Age: 72
End: 2022-03-13
Payer: MEDICARE

## 2022-03-13 DIAGNOSIS — F41.9 ANXIETY: ICD-10-CM

## 2022-03-14 RX ORDER — ALPRAZOLAM 0.5 MG
0.5 TABLET ORAL 3 TIMES DAILY PRN
Qty: 90 TABLET | Refills: 0 | Status: CANCELLED | OUTPATIENT
Start: 2022-03-14

## 2022-03-15 DIAGNOSIS — F41.9 ANXIETY: ICD-10-CM

## 2022-03-16 ENCOUNTER — VIRTUAL VISIT (OUTPATIENT)
Dept: ONCOLOGY | Facility: CLINIC | Age: 72
End: 2022-03-16
Attending: INTERNAL MEDICINE
Payer: MEDICARE

## 2022-03-16 ENCOUNTER — MYC REFILL (OUTPATIENT)
Dept: FAMILY MEDICINE | Facility: CLINIC | Age: 72
End: 2022-03-16
Payer: MEDICARE

## 2022-03-16 DIAGNOSIS — C09.9 SQUAMOUS CELL CARCINOMA OF LEFT TONSIL (H): Primary | ICD-10-CM

## 2022-03-16 DIAGNOSIS — F41.9 ANXIETY: ICD-10-CM

## 2022-03-16 PROCEDURE — 99442 PR PHYSICIAN TELEPHONE EVALUATION 11-20 MIN: CPT | Mod: 95 | Performed by: INTERNAL MEDICINE

## 2022-03-16 NOTE — PROGRESS NOTES
[facility]    PATIENT NAME: Quan Murphy  MRN # [id number]   DATE OF VISIT: March 16, 2022  YOB: 1950     CANCER TYPE: SCC L tonsil, p16 +gardenia  STAGE: cT2N2 (II)  ECOG PS: 1    PD-L1:  NGS:    SUMMARY  3/22/21 L tonsil bx in clinic (Dr. Christensen). Path: SCC, non keratinizing, p16 +gardenia  3/23/21 CT neck. 2.9 x 2.7 x 3.5 cm L tonsil fullness involving soft palate, level 2-3 neck nodes  4/2/21 PET/CT. 2.7 x 2.2 x 2.7 cm L palatine tonsil mass (SUV 24), extension to oral cavity, 3.4 x 2.0 cm L level 4 node invading SCM, 1.8 x 1.8 cm L level 2A/3 node (SUV 7.9) w/ECS, 1.4 x 0.9 cm R level 2A node (SUV 6.7)  4/28~6/12/21  Chemoradiation with weekly cisplatin. No HD cisplatin due to CKD. Cisplatin held 6/2/21 due to malnutrition, SURESH  5/21/21 [facility] ED for fever to 102. No localizing source, cultures negative, not neutropenic  6/2~6/9/21 [facility] for malnutrition, inability to tolerate PO, SURESH. C/b respiratory arrest due to opioids, aspiration pneumonia, anxiety    ASSESSMENT AND PLAN   SCC L tonsil, [id number], p16 +gardenia, ECS +gardenia: 9 months after chemoradiation. CT in April. If no surprises, discussed he longer needs to see me and will continue to follow up with Dr. Fang and Dr. Robbins.     Lymphedema: doing massages    Hypothyroidism: on levothyroxine     CKD: Better    20 minutes spent on this phone call    Shanthi Schrader MD  Associate Professor of Medicine  Hematology, Oncology and Transplantation      SUBJECTIVE  New returns for 9 month post chemoradiation follow up. CTs are planned 4/8.     Was in the ED for chest pain. Trop negative x 2, EKG unremarkable. Treadmill stress test 3/1 was negative for inducible ischemia. EF was 61%.   Dry mouth persists. Has tried sour candy  Dentist in the next month  Some gum discomfort  Doing massages  Eating ok    PAST MEDICAL HISTORY  SCC as above  ASCVD. Angina in 12/2016. [facility] with proximal LAD stenosis, s/p PTCA and stent. Not on metoprolol due to  hypotension. Ses Dr. Aramis Ch, Cardiologist.  CKD baseline ~ 1.2-1.3, up to 1.93 on 11/2/20  HTN  Dyslipidemia  H/o prostate ca s/p robotic prostatectomy about 16 years ago followed by Dr. Meir Torres at [facility] in [place]  Nephrolithasis.   Bladder stones. S/p litholapaxy of 2 stones 2/16/22 (Dr. Rogers)  Anxiety, insomnia. Takes zolpidem and alprazolam nightly   OA  Chronic sinusitus. Takes chronic hydrocodone-acetaminophen for this  Inguinal hernia  GERD, hiatal hernia. Met with Dr. Adams 2/18/20, symptoms not due to hiatal hernia  ORIF R radius 2017  H/o retroperitoneal hematoma 10/2017. Admitted to [facility]. [place] to be related to lithotripsy 10 days prior  Hemorrhoids  PAD s/p R iliac artery stent after injury during [facility]    CURRENT OUTPATIENT MEDICATIONS  Current Outpatient Medications   Medication Sig Dispense Refill     ALPRAZolam (XANAX) 0.5 MG tablet Take 1 tablet (0.5 mg) by mouth 2 times daily as needed for anxiety 60 tablet 0     cetirizine (ZYRTEC) 10 MG tablet Take 10 mg by mouth       cevimeline (EVOXAC) 30 MG capsule Take 1 capsule (30 mg) by mouth 3 times daily 120 capsule 11     citalopram (CELEXA) 40 MG tablet TAKE ONE TABLET BY MOUTH ONCE DAILY 90 tablet 0     dronabinol (MARINOL) 2.5 MG capsule Take 1 capsule (2.5 mg) by mouth 2 times daily (before meals) 28 capsule 3     HYDROcodone-acetaminophen (NORCO)  MG per tablet Take 1 tablet by mouth 4 times daily as needed for severe pain 120 tablet 0     HYDROcodone-acetaminophen (NORCO) 5-325 MG tablet Take 1-2 tablets by mouth every 4 hours as needed for moderate to severe pain 10 tablet 0     hydrOXYzine (VISTARIL) 25 MG capsule TAKE ONE CAPSULE BY MOUTH FOUR TIMES A DAY AS NEEDED FOR ANXIETY 120 capsule 4     levothyroxine (SYNTHROID/LEVOTHROID) 137 MCG tablet Take 1 tablet (137 mcg) by mouth daily 30 tablet 11     LORazepam (ATIVAN) 0.5 MG tablet Take 1 tablet (0.5 mg) by mouth every 8 hours as needed for anxiety 8 tablet 0      metoprolol succinate ER (TOPROL-XL) 50 MG 24 hr tablet Take 1 tablet (50 mg) by mouth daily 90 tablet 2     nitroGLYcerin (NITROSTAT) 0.4 MG sublingual tablet For chest pain place 1 tablet under the tongue every 5 minutes for 3 doses. If symptoms persist 5 minutes after 1st dose call 911. (Patient not taking: Reported on 9/30/2021) 25 tablet 0     omeprazole (PRILOSEC) 40 MG DR capsule Take 1 capsule (40 mg) by mouth daily 90 capsule 3     pilocarpine (SALAGEN) 5 MG tablet Take 1 tablet (5 mg) by mouth 3 times daily 90 tablet 3     rosuvastatin (CRESTOR) 40 MG tablet Take 1 tablet (40 mg) by mouth daily 90 tablet 3     zolpidem ER (AMBIEN CR) 12.5 MG CR tablet Take 1 tablet (12.5 mg) by mouth nightly as needed for sleep 30 tablet 3     ALLERGIES  Allergies   Allergen Reactions     Animal Dander      Azithromycin Nausea and Vomiting     Dust Mites      Pollen Extract      Smoke.       REVIEW OF SYSTEMS  As above in the HPI, o/w complete 12-point ROS was negative.    PHYSICAL EXAM  There were no vitals taken for this visit.  GEN: NAD    Remainder of physical exam deferred due to public health emergency and limitations of telephone visit.    LABORATORY AND IMAGING STUDIES   2/26/2022 12:11   Sodium 138   Potassium 4.0   Chloride 106   Carbon Dioxide 24   Urea Nitrogen 15   Creatinine 1.04   GFR Estimate 77   Calcium 9.2   Anion Gap 8   Troponin I High Sensitivity 6   Glucose 98   WBC 3.4 (L)   Hemoglobin 12.5 (L)   Hematocrit 36.6 (L)   Platelet Count 163   RBC Count 4.20 (L)   MCV 87   MCH 29.8   MCHC 34.2   RDW 13.3   % Neutrophils 67   % Lymphocytes 20   % Monocytes 11   % Eosinophils 1   % Basophils 1   Absolute Basophils 0.0   Absolute Eosinophils 0.0   Absolute Immature Granulocytes 0.0   Absolute Lymphocytes 0.7 (L)   Absolute Monocytes 0.4   % Immature Granulocytes 0   Absolute Neutrophils 2.3   Absolute NRBCs 0.0   NRBCs per 100 WBC 0     Labs were independently reviewed and interpreted by clair Wolff is  a 71 year old who is being evaluated via a billable telephone visit.    Phone [phone] (Mobile)  Started as a video visit but he     How would you like to obtain your AVS? Kash Chan VF

## 2022-03-16 NOTE — LETTER
3/16/2022     RE: Quan Murphy  [address] [facility] [address]    Dear Colleague,    Thank you for referring your patient, Quan Murphy, to the [facility]. Please see a copy of my visit note below.       [facility]    PATIENT NAME: Quan Murphy  MRN # [id number]   DATE OF VISIT: March 16, 2022  YOB: 1950     CANCER TYPE: SCC L tonsil, p16 +gardenia  STAGE: cT2N2 (II)  ECOG PS: 1    PD-L1:  NGS:    SUMMARY  3/22/21 L tonsil bx in clinic (Dr. Christensen). Path: SCC, non keratinizing, p16 +gardenia  3/23/21 CT neck. 2.9 x 2.7 x 3.5 cm L tonsil fullness involving soft palate, level 2-3 neck nodes  4/2/21 PET/CT. 2.7 x 2.2 x 2.7 cm L palatine tonsil mass (SUV 24), extension to oral cavity, 3.4 x 2.0 cm L level 4 node invading SCM, 1.8 x 1.8 cm L level 2A/3 node (SUV 7.9) w/ECS, 1.4 x 0.9 cm R level 2A node (SUV 6.7)  4/28~6/12/21  Chemoradiation with weekly cisplatin. No HD cisplatin due to CKD. Cisplatin held 6/2/21 due to malnutrition, SURESH  5/21/21 [facility] ED for fever to 102. No localizing source, cultures negative, not neutropenic  6/2~6/9/21 [facility] for malnutrition, inability to tolerate PO, SURESH. C/b respiratory arrest due to opioids, aspiration pneumonia, anxiety    ASSESSMENT AND PLAN   SCC L tonsil, [id number], p16 +gardenia, ECS +gardenia: 9 months after chemoradiation. CT in April. If no surprises, discussed he longer needs to see me and will continue to follow up with Dr. Fang and Dr. Robbins.     Lymphedema: doing massages    Hypothyroidism: on levothyroxine     CKD: Better    20 minutes spent on this phone call    Shanthi Schrader MD  Associate Professor of Medicine  Hematology, Oncology and Transplantation      SUBJECTIVE  New returns for 9 month post chemoradiation follow up. CTs are planned 4/8.     Was in the ED for chest pain. Trop negative x 2, EKG unremarkable. Treadmill stress test 3/1 was negative for inducible ischemia. EF was 61%.   Dry mouth  persists. Has tried sour candy  Dentist in the next month  Some gum discomfort  Doing massages  Eating ok    PAST MEDICAL HISTORY  SCC as above  ASCVD. Angina in 12/2016. [facility] with proximal LAD stenosis, s/p PTCA and stent. Not on metoprolol due to hypotension. Moni Ch, Cardiologist.  CKD baseline ~ 1.2-1.3, up to 1.93 on 11/2/20  HTN  Dyslipidemia  H/o prostate ca s/p robotic prostatectomy about 16 years ago followed by Dr. Meir Torres at [facility] in [place]  Nephrolithasis.   Bladder stones. S/p litholapaxy of 2 stones 2/16/22 (Dr. Rogers)  Anxiety, insomnia. Takes zolpidem and alprazolam nightly   OA  Chronic sinusitus. Takes chronic hydrocodone-acetaminophen for this  Inguinal hernia  GERD, hiatal hernia. Met with Dr. Adams 2/18/20, symptoms not due to hiatal hernia  ORIF R radius 2017  H/o retroperitoneal hematoma 10/2017. Admitted to [facility]. [place] to be related to lithotripsy 10 days prior  Hemorrhoids  PAD s/p R iliac artery stent after injury during [facility]    CURRENT OUTPATIENT MEDICATIONS  Current Outpatient Medications   Medication Sig Dispense Refill     ALPRAZolam (XANAX) 0.5 MG tablet Take 1 tablet (0.5 mg) by mouth 2 times daily as needed for anxiety 60 tablet 0     cetirizine (ZYRTEC) 10 MG tablet Take 10 mg by mouth       cevimeline (EVOXAC) 30 MG capsule Take 1 capsule (30 mg) by mouth 3 times daily 120 capsule 11     citalopram (CELEXA) 40 MG tablet TAKE ONE TABLET BY MOUTH ONCE DAILY 90 tablet 0     dronabinol (MARINOL) 2.5 MG capsule Take 1 capsule (2.5 mg) by mouth 2 times daily (before meals) 28 capsule 3     HYDROcodone-acetaminophen (NORCO)  MG per tablet Take 1 tablet by mouth 4 times daily as needed for severe pain 120 tablet 0     HYDROcodone-acetaminophen (NORCO) 5-325 MG tablet Take 1-2 tablets by mouth every 4 hours as needed for moderate to severe pain 10 tablet 0     hydrOXYzine (VISTARIL) 25 MG capsule TAKE ONE CAPSULE BY MOUTH FOUR TIMES A DAY AS NEEDED  FOR ANXIETY 120 capsule 4     levothyroxine (SYNTHROID/LEVOTHROID) 137 MCG tablet Take 1 tablet (137 mcg) by mouth daily 30 tablet 11     LORazepam (ATIVAN) 0.5 MG tablet Take 1 tablet (0.5 mg) by mouth every 8 hours as needed for anxiety 8 tablet 0     metoprolol succinate ER (TOPROL-XL) 50 MG 24 hr tablet Take 1 tablet (50 mg) by mouth daily 90 tablet 2     nitroGLYcerin (NITROSTAT) 0.4 MG sublingual tablet For chest pain place 1 tablet under the tongue every 5 minutes for 3 doses. If symptoms persist 5 minutes after 1st dose call 911. (Patient not taking: Reported on 9/30/2021) 25 tablet 0     omeprazole (PRILOSEC) 40 MG DR capsule Take 1 capsule (40 mg) by mouth daily 90 capsule 3     pilocarpine (SALAGEN) 5 MG tablet Take 1 tablet (5 mg) by mouth 3 times daily 90 tablet 3     rosuvastatin (CRESTOR) 40 MG tablet Take 1 tablet (40 mg) by mouth daily 90 tablet 3     zolpidem ER (AMBIEN CR) 12.5 MG CR tablet Take 1 tablet (12.5 mg) by mouth nightly as needed for sleep 30 tablet 3     ALLERGIES  Allergies   Allergen Reactions     Animal Dander      Azithromycin Nausea and Vomiting     Dust Mites      Pollen Extract      Smoke.       REVIEW OF SYSTEMS  As above in the HPI, o/w complete 12-point ROS was negative.    PHYSICAL EXAM  There were no vitals taken for this visit.  GEN: NAD    Remainder of physical exam deferred due to public health emergency and limitations of telephone visit.    LABORATORY AND IMAGING STUDIES   2/26/2022 12:11   Sodium 138   Potassium 4.0   Chloride 106   Carbon Dioxide 24   Urea Nitrogen 15   Creatinine 1.04   GFR Estimate 77   Calcium 9.2   Anion Gap 8   Troponin I High Sensitivity 6   Glucose 98   WBC 3.4 (L)   Hemoglobin 12.5 (L)   Hematocrit 36.6 (L)   Platelet Count 163   RBC Count 4.20 (L)   MCV 87   MCH 29.8   MCHC 34.2   RDW 13.3   % Neutrophils 67   % Lymphocytes 20   % Monocytes 11   % Eosinophils 1   % Basophils 1   Absolute Basophils 0.0   Absolute Eosinophils 0.0   Absolute  Immature Granulocytes 0.0   Absolute Lymphocytes 0.7 (L)   Absolute Monocytes 0.4   % Immature Granulocytes 0   Absolute Neutrophils 2.3   Absolute NRBCs 0.0   NRBCs per 100 WBC 0     Labs were independently reviewed and interpreted by me    New is a 71 year old who is being evaluated via a billable telephone visit.    Phone [phone] (Mobile)  Started as a video visit but he     How would you like to obtain your AVS? Kash Chan VF     Again, thank you for allowing me to participate in the care of your patient.      Sincerely,    Shanthi Schrader MD

## 2022-03-17 ENCOUNTER — OFFICE VISIT (OUTPATIENT)
Dept: CARDIOLOGY | Facility: CLINIC | Age: 72
End: 2022-03-17
Payer: MEDICARE

## 2022-03-17 VITALS
BODY MASS INDEX: 29.7 KG/M2 | DIASTOLIC BLOOD PRESSURE: 66 MMHG | HEIGHT: 68 IN | SYSTOLIC BLOOD PRESSURE: 114 MMHG | WEIGHT: 196 LBS | OXYGEN SATURATION: 96 % | HEART RATE: 80 BPM

## 2022-03-17 DIAGNOSIS — I25.750 CORONARY ARTERY DISEASE INVOLVING NATIVE ARTERY OF TRANSPLANTED HEART WITH UNSTABLE ANGINA PECTORIS (H): ICD-10-CM

## 2022-03-17 PROCEDURE — 99214 OFFICE O/P EST MOD 30 MIN: CPT | Performed by: PHYSICIAN ASSISTANT

## 2022-03-17 RX ORDER — CITALOPRAM HYDROBROMIDE 40 MG/1
TABLET ORAL
Qty: 90 TABLET | Refills: 0 | Status: SHIPPED | OUTPATIENT
Start: 2022-03-17 | End: 2022-06-30

## 2022-03-17 RX ORDER — CITALOPRAM HYDROBROMIDE 40 MG/1
40 TABLET ORAL DAILY
Qty: 90 TABLET | Refills: 0 | OUTPATIENT
Start: 2022-03-17

## 2022-03-17 NOTE — PROGRESS NOTES
[id number]      HPI and Plan:   See dictation    Orders this Visit:  No orders of the defined types were placed in this encounter.    No orders of the defined types were placed in this encounter.    There are no discontinued medications.      Encounter Diagnoses   Name Primary?     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)      Chest pain        CURRENT MEDICATIONS:  Current Outpatient Medications   Medication Sig Dispense Refill     ALPRAZolam (XANAX) 0.5 MG tablet Take 1 tablet (0.5 mg) by mouth 2 times daily as needed for anxiety 60 tablet 0     cetirizine (ZYRTEC) 10 MG tablet Take 10 mg by mouth       cevimeline (EVOXAC) 30 MG capsule Take 1 capsule (30 mg) by mouth 3 times daily 120 capsule 11     citalopram (CELEXA) 40 MG tablet TAKE ONE TABLET BY MOUTH ONCE DAILY 90 tablet 0     dronabinol (MARINOL) 2.5 MG capsule Take 1 capsule (2.5 mg) by mouth 2 times daily (before meals) 28 capsule 3     HYDROcodone-acetaminophen (NORCO)  MG per tablet Take 1 tablet by mouth 4 times daily as needed for severe pain 120 tablet 0     HYDROcodone-acetaminophen (NORCO) 5-325 MG tablet Take 1-2 tablets by mouth every 4 hours as needed for moderate to severe pain 10 tablet 0     hydrOXYzine (VISTARIL) 25 MG capsule TAKE ONE CAPSULE BY MOUTH FOUR TIMES A DAY AS NEEDED FOR ANXIETY 120 capsule 4     levothyroxine (SYNTHROID/LEVOTHROID) 137 MCG tablet Take 1 tablet (137 mcg) by mouth daily 30 tablet 11     LORazepam (ATIVAN) 0.5 MG tablet Take 1 tablet (0.5 mg) by mouth every 8 hours as needed for anxiety 8 tablet 0     metoprolol succinate ER (TOPROL-XL) 50 MG 24 hr tablet Take 1 tablet (50 mg) by mouth daily 90 tablet 2     omeprazole (PRILOSEC) 40 MG DR capsule Take 1 capsule (40 mg) by mouth daily 90 capsule 3     pilocarpine (SALAGEN) 5 MG tablet Take 1 tablet (5 mg) by mouth 3 times daily 90 tablet 3     rosuvastatin (CRESTOR) 40 MG tablet Take 1 tablet (40 mg)  by mouth daily 90 tablet 3     zolpidem ER (AMBIEN CR) 12.5 MG CR tablet Take 1 tablet (12.5 mg) by mouth nightly as needed for sleep 30 tablet 3     nitroGLYcerin (NITROSTAT) 0.4 MG sublingual tablet For chest pain place 1 tablet under the tongue every 5 minutes for 3 doses. If symptoms persist 5 minutes after 1st dose call 911. (Patient not taking: Reported on 9/30/2021) 25 tablet 0       ALLERGIES     Allergies   Allergen Reactions     Animal Dander      Azithromycin Nausea and Vomiting     Dust Mites      Pollen Extract      Smoke.        PAST MEDICAL HISTORY:  Past Medical History:   Diagnosis Date     Allergic rhinitis      Allergy, unspecified not elsewhere classified     Seasonal allergies, pollen, dust, smoke and animals     Antiplatelet or antithrombotic long-term use      Anxiety      Arthritis      Chest pain      Chronic sinusitis      Coronary atherosclerosis of unspecified type of vessel, native or graft     Coronary artery disease     Depressive disorder 1995     Gastroesophageal reflux disease 2020    Cleared with medication     Head injury 1954     Hiatal hernia 2015    Right Side     History of blood transfusion 12/15/2004    Prostate Surgery - My own blood     Hyperlipidemia      Hypertension      Inguinal hernia      Kidney problem 10/08/2017    Lithotripsy     Kidney stones      Malignant neoplasm of prostate (H)     Prostate cancer     Prostate cancer (H)      Syncope        PAST SURGICAL HISTORY:  Past Surgical History:   Procedure Laterality Date     ARTHRODESIS FOOT  7/23/2013    Procedure: ARTHRODESIS FOOT;  Great Toe Arthrodesis Left Foot;  Surgeon: Ash Gonzalez DPM;  Location: [facility] OR     ARTHRODESIS FOOT  6/10/2014    Procedure: ARTHRODESIS FOOT;  Surgeon: Ash Gonzalez DPM;  Location: [facility] OR     BIOPSY  10/1/2004    dermatologist biopsies     COLONOSCOPY  10/7/2013    Procedure: COLONOSCOPY;  Colonoscopy;  Surgeon: Mike Fallon MD;  Location:  GI     CYSTOSCOPY N/A  2/16/2022    Procedure: CYSTOSCOPY and bladder stone removal;  Surgeon: David Rogers MD;  Location: [facility] OR     ESOPHAGOSCOPY, GASTROSCOPY, DUODENOSCOPY (EGD), COMBINED N/A 2/12/2020    Procedure: ESOPHAGOGASTRODUODENOSCOPY (EGD);  Surgeon: Sam Escobar MD;  Location: [facility] GI     EXTRACORPOREAL SHOCK WAVE LITHOTRIPSY (ESWL) Bilateral 10/18/2017    Procedure: EXTRACORPOREAL SHOCK WAVE LITHOTRIPSY (ESWL);  BILATERAL EXTRACORPOREAL SHOCKWAVE LITHOTRIPSY ;  Surgeon: Meir Torres MD;  Location: [facility] OR     HC CORRECT BUNION,SIMPLE  08/11/2005    x3     HC REMV TOE BENIGN BONE LESN  08/11/2005     HEAD & NECK SURGERY  1954    From a fall     HERNIORRHAPHY INGUINAL  7/3/2013    Procedure: HERNIORRHAPHY INGUINAL;  Open Repair Inguinal hernia Right with mesh ;  Surgeon: Sam Escobar MD;  Location: [facility] OR     IR GASTROSTOMY TUBE PERCUTANEOUS PLCMNT  6/7/2021     MOHS MICROGRAPHIC PROCEDURE  08/23/11    ear and chin-[facility]     OPEN REDUCTION INTERNAL FIXATION WRIST Right 7/18/2017    Procedure: OPEN REDUCTION INTERNAL FIXATION WRIST;  Right distal radius open reduction and internal fixation;  Surgeon: Pedro Blanca DO;  Location: [facility] OR     RECONSTRUCT FOREFOOT WITH METATARSOPHALANGEAL (MTP) FUSION  6/10/2014    Procedure: RECONSTRUCT FOREFOOT WITH METATARSOPHALANGEAL (MTP) FUSION;  Surgeon: Ash Gonzalez DPM;  Location: [facility] OR     STENT, CORONARY, DEMI       SURGICAL HISTORY OF -   1999/1974    lt knee     SURGICAL HISTORY OF -   10/2004    lithotripsy     SURGICAL HISTORY OF -   11/05    angiogram with stent     VASCULAR SURGERY  11/17/2005    Puncture of iliac artery during and angiogram     [facility] LAPAROSCOPY, SURGICAL PROSTATECTOMY, RETROPUBIC RADICAL, W/NERVE SPARING  11/30/2004    With full bilateral pelvic lymphadenectomy.  -[facility].     [facility] TOTAL KNEE ARTHROPLASTY  05/01/08    Left knee       FAMILY HISTORY:  Family History   Problem Relation Age of Onset     Hypertension  Father         Lived to age 87     Connective Tissue Disorder Mother         LUPUS     Heart Disease Mother         Valve replacement     Anxiety Disorder Mother         Lived to age 84     Dementia Mother         Nursing Home (lived to age 86)       SOCIAL HISTORY:  Social History     Socioeconomic History     Marital status:      Spouse name: Alessandra     Number of children: 2     Years of education: Not on file     Highest education level: Not on file   Occupational History     Occupation: Retired   Tobacco Use     Smoking status: Never Smoker     Smokeless tobacco: Never Used   Vaping Use     Vaping Use: Never used   Substance and Sexual Activity     Alcohol use: Yes     Alcohol/week: 8.3 standard drinks     Types: 10 Cans of beer per week     Comment: occasional     Drug use: No     Sexual activity: Yes     Partners: Female     Birth control/protection: Female Surgical   Other Topics Concern      Service Not Asked     Blood Transfusions Not Asked     Caffeine Concern Not Asked     Occupational Exposure Not Asked     Hobby Hazards Not Asked     Sleep Concern Not Asked     Stress Concern Not Asked     Weight Concern Not Asked     Special Diet Not Asked     Back Care Not Asked     Exercise Not Asked     Bike Helmet Not Asked     Seat Belt Not Asked     Self-Exams Not Asked     Parent/sibling w/ CABG, MI or angioplasty before 65F 55M? No   Social History Narrative     Not on file     Social Determinants of Health     Financial Resource Strain: Not on file   Food Insecurity: Not on file   Transportation Needs: Not on file   Physical Activity: Not on file   Stress: Not on file   Social Connections: Not on file   Intimate Partner Violence: Not on file   Housing Stability: Not on file       Review of Systems:  Skin:  Negative     Eyes:  Positive for glasses  ENT:  Negative    Respiratory:  Negative    Cardiovascular:  lightheadedness;dizziness;Negative for;palpitations;edema Positive for;chest  "pain  Gastroenterology: Negative    Genitourinary:  Negative    Musculoskeletal:  Negative    Neurologic:  Negative    Psychiatric:  Positive for anxiety;sleep disturbances  Heme/Lymph/Imm:  Positive for allergies  Endocrine:  Negative      Physical Exam:  Vitals: /66 (BP Location: Right arm, Patient Position: Sitting, Cuff Size: Adult Regular)   Pulse 80   Ht 1.727 m (5' 8\")   Wt 88.9 kg (196 lb)   SpO2 96%   BMI 29.80 kg/m     Please refer to dictation for physical exam    Recent Lab Results: all reviewed today  CBC RESULTS:  Lab Results   Component Value Date    WBC 3.4 (L) 02/26/2022    WBC 6.6 07/02/2021    RBC 4.20 (L) 02/26/2022    RBC 3.25 (L) 07/02/2021    HGB 12.5 (L) 02/26/2022    HGB 9.3 (L) 07/02/2021    HCT 36.6 (L) 02/26/2022    HCT 29.4 (L) 07/02/2021    MCV 87 02/26/2022    MCV 91 07/02/2021    MCH 29.8 02/26/2022    MCH 28.6 07/02/2021    MCHC 34.2 02/26/2022    MCHC 31.6 07/02/2021    RDW 13.3 02/26/2022    RDW 16.3 (H) 07/02/2021     02/26/2022     07/02/2021       BMP RESULTS:  Lab Results   Component Value Date     02/26/2022     07/02/2021    POTASSIUM 4.0 02/26/2022    POTASSIUM 4.1 07/02/2021    CHLORIDE 106 02/26/2022    CHLORIDE 100 07/02/2021    CO2 24 02/26/2022    CO2 29 07/02/2021    ANIONGAP 8 02/26/2022    ANIONGAP 6 07/02/2021    GLC 98 02/26/2022     (H) 07/02/2021    BUN 15 02/26/2022    BUN 23 07/02/2021    CR 1.04 02/26/2022    CR 1.14 07/02/2021    GFRESTIMATED 77 02/26/2022    GFRESTIMATED 65 07/02/2021    GFRESTBLACK 75 07/02/2021    LATRELL 9.2 02/26/2022    LATRELL 8.5 07/02/2021        INR RESULTS:  Lab Results   Component Value Date    INR 1.49 (H) 06/07/2021    INR 1.08 10/28/2017           CC  Aramis Ch MD  [id number]  [place] [id number]      "

## 2022-03-17 NOTE — PROGRESS NOTES
Service Date: 2022    PRIMARY CARDIOLOGIST:  Aramis Ch MD    REASON FOR VISIT:  Stress test, chest pain follow-up.    HISTORY OF PRESENT ILLNESS:  Mr. Murphy is a delightful 71-year-old gentleman with past medical history significant for the followin.  Coronary artery disease with drug-eluting stent to the proximal LAD for what appeared to be a 50% proximal lesion but with a positive FFR with drug-eluting stent placed.  The remainder of his coronary tree had 30% and 40% lesions.  2.  Hyperlipidemia.  3.  Hypertension.  4.  History of prostate cancer, treated with prostatectomy.  5.  History of tonsillar cancer, treated with radiation and chemotherapy.  6.  Anxiety.    Quan comes in as he had been getting some left shoulder and arm discomfort, as well as discomfort in his clavicle.  This is not necessarily exertional, but concerning him.  It was at one point so bad that he presented to the ER and he had been very worried about this stress test.  The stress test was completed and reviewed today.  He went 9 minutes and 35 seconds.  Maximum heart rate of 126, which is in the predicted range.  Blood pressure went up to 189/90.  He achieved 10 METs.  The imaging showed no ischemia or infarct, but the possibility of diaphragmatic attenuation.    He comes in today to discuss those results.  Of note, he had no chest discomfort, shoulder or arm discomfort on his treadmill.  When he was getting those symptoms, it was typically at rest.  If he exercises or does something, he feels well.  He denies orthopnea or PND, fevers, chills or problems with dyspnea on exertion.  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    SOCIAL HISTORY:  He is .  His wife is a retired RN.  He has about 2 drinks a day.    PHYSICAL  EXAMINATION:  GENERAL:  Well-developed, well-nourished gentleman in no acute distress.  HEENT:  Normocephalic, atraumatic.  HEART:  Regular in rate and rhythm.  I do not appreciate murmur, rub or gallop.  LUNGS:  Clear, without wheezes, rales, or rhonchi.  EXTREMITIES:  2+ radial, ulnar and dorsalis pedis and posterior tibialis pulses bilaterally, no edema.    Last labs show a hemoglobin of 12.5.  BMP showing a creatinine of 1.04, BUN 50, potassium 4.0, sodium 138.    ASSESSMENT AND PLAN:  1.  Coronary artery disease, status post drug-eluting stent to the proximal LAD in 2016 with a normal nuclear stress test without infarct or ischemia and an excellent exercise tolerance at 10 METs.  At this point, I suspect this is a true normal stress test and less likely a false negative.  We discussed that today, the fact that his EF looked normal, he can continue with normal activities.  Should the chest discomfort/shoulder and arm discomfort reoccur in an exertional pattern or at rest, would consider doing a right and left heart catheterization for him, but I am quite confident that this is a true normal.  2.  Hypertension, well-controlled.  3.  Dyslipidemia, excellent control.  Last LDL 65, HDL 51, total cholesterol 156.  4.  Multiple comorbidities including squamous cell carcinoma of the tonsil, prostate cancer, bladder stones, orthopedic issues and the patient is exhausted from clinic visits.  I do not think he requires additional workup now and we will have him see Dr. Ch back as scheduled in November, sooner with concerns.  He will get an echocardiogram and labs at that time.    Thank you for allowing me to participate in this delightful patient's care.    Roma Price PA-C        D: 2022   T: 2022   MT: al    Name:     CHUCHO MONTOYA  MRN:      [id number]        Account:      [id number]   :      1950           Service Date: 2022       Document: [id number]

## 2022-03-17 NOTE — LETTER
3/17/2022    Jose Hernandez MD  [address] [facility] [address]    RE: Quan Murphy       Dear Colleague,     I had the pleasure of seeing Quan Murphy in the [facility].  [id number]      HPI and Plan:   See dictation    Orders this Visit:  No orders of the defined types were placed in this encounter.    No orders of the defined types were placed in this encounter.    There are no discontinued medications.      Encounter Diagnoses   Name Primary?     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)      Chest pain        CURRENT MEDICATIONS:  Current Outpatient Medications   Medication Sig Dispense Refill     ALPRAZolam (XANAX) 0.5 MG tablet Take 1 tablet (0.5 mg) by mouth 2 times daily as needed for anxiety 60 tablet 0     cetirizine (ZYRTEC) 10 MG tablet Take 10 mg by mouth       cevimeline (EVOXAC) 30 MG capsule Take 1 capsule (30 mg) by mouth 3 times daily 120 capsule 11     citalopram (CELEXA) 40 MG tablet TAKE ONE TABLET BY MOUTH ONCE DAILY 90 tablet 0     dronabinol (MARINOL) 2.5 MG capsule Take 1 capsule (2.5 mg) by mouth 2 times daily (before meals) 28 capsule 3     HYDROcodone-acetaminophen (NORCO)  MG per tablet Take 1 tablet by mouth 4 times daily as needed for severe pain 120 tablet 0     HYDROcodone-acetaminophen (NORCO) 5-325 MG tablet Take 1-2 tablets by mouth every 4 hours as needed for moderate to severe pain 10 tablet 0     hydrOXYzine (VISTARIL) 25 MG capsule TAKE ONE CAPSULE BY MOUTH FOUR TIMES A DAY AS NEEDED FOR ANXIETY 120 capsule 4     levothyroxine (SYNTHROID/LEVOTHROID) 137 MCG tablet Take 1 tablet (137 mcg) by mouth daily 30 tablet 11     LORazepam (ATIVAN) 0.5 MG tablet Take 1 tablet (0.5 mg) by mouth every 8 hours as needed for anxiety 8 tablet 0     metoprolol succinate ER (TOPROL-XL) 50 MG 24 hr tablet Take 1 tablet (50 mg) by mouth daily 90 tablet 2     omeprazole (PRILOSEC) 40 MG   capsule Take 1 capsule (40 mg) by mouth daily 90 capsule 3     pilocarpine (SALAGEN) 5 MG tablet Take 1 tablet (5 mg) by mouth 3 times daily 90 tablet 3     rosuvastatin (CRESTOR) 40 MG tablet Take 1 tablet (40 mg) by mouth daily 90 tablet 3     zolpidem ER (AMBIEN CR) 12.5 MG CR tablet Take 1 tablet (12.5 mg) by mouth nightly as needed for sleep 30 tablet 3     nitroGLYcerin (NITROSTAT) 0.4 MG sublingual tablet For chest pain place 1 tablet under the tongue every 5 minutes for 3 doses. If symptoms persist 5 minutes after 1st dose call 911. (Patient not taking: Reported on 9/30/2021) 25 tablet 0       ALLERGIES     Allergies   Allergen Reactions     Animal Dander      Azithromycin Nausea and Vomiting     Dust Mites      Pollen Extract      Smoke.        PAST MEDICAL HISTORY:  Past Medical History:   Diagnosis Date     Allergic rhinitis      Allergy, unspecified not elsewhere classified     Seasonal allergies, pollen, dust, smoke and animals     Antiplatelet or antithrombotic long-term use      Anxiety      Arthritis      Chest pain      Chronic sinusitis      Coronary atherosclerosis of unspecified type of vessel, native or graft     Coronary artery disease     Depressive disorder 1995     Gastroesophageal reflux disease 2020    Cleared with medication     Head injury 1954     Hiatal hernia 2015    Right Side     History of blood transfusion 12/15/2004    Prostate Surgery - My own blood     Hyperlipidemia      Hypertension      Inguinal hernia      Kidney problem 10/08/2017    Lithotripsy     Kidney stones      Malignant neoplasm of prostate (H)     Prostate cancer     Prostate cancer (H)      Syncope        PAST SURGICAL HISTORY:  Past Surgical History:   Procedure Laterality Date     ARTHRODESIS FOOT  7/23/2013    Procedure: ARTHRODESIS FOOT;  Great Toe Arthrodesis Left Foot;  Surgeon: Ash Gonzalez DPM;  [facility]     ARTHRODESIS FOOT  6/10/2014    Procedure: ARTHRODESIS FOOT;  Surgeon: Ash Gonzalez  LUIS Champion;  [facility]     BIOPSY  10/1/2004    dermatologist biopsies     COLONOSCOPY  10/7/2013    Procedure: COLONOSCOPY;  Colonoscopy;  Surgeon: Mike Fallon MD;  [facility]     CYSTOSCOPY N/A 2/16/2022    Procedure: CYSTOSCOPY and bladder stone removal;  Surgeon: David Rogers MD;  [facility]     ESOPHAGOSCOPY, GASTROSCOPY, DUODENOSCOPY (EGD), COMBINED N/A 2/12/2020    Procedure: ESOPHAGOGASTRODUODENOSCOPY (EGD);  Surgeon: Sam Escobar MD;  [facility]     EXTRACORPOREAL SHOCK WAVE LITHOTRIPSY (ESWL) Bilateral 10/18/2017    Procedure: EXTRACORPOREAL SHOCK WAVE LITHOTRIPSY (ESWL);  BILATERAL EXTRACORPOREAL SHOCKWAVE LITHOTRIPSY ;  Surgeon: Meir Torres MD;  Location: [facility]     HC CORRECT BUNION,SIMPLE  08/11/2005    x3     HC REMV TOE BENIGN BONE LESN  08/11/2005     HEAD & NECK SURGERY  1954    From a fall     HERNIORRHAPHY INGUINAL  7/3/2013    Procedure: HERNIORRHAPHY INGUINAL;  Open Repair Inguinal hernia Right with mesh ;  Surgeon: Sam Escobar MD;  [facility]     IR GASTROSTOMY TUBE PERCUTANEOUS PLCMNT  6/7/2021     MOHS MICROGRAPHIC PROCEDURE  08/23/11    ear and chin-[facility]     OPEN REDUCTION INTERNAL FIXATION WRIST Right 7/18/2017    Procedure: OPEN REDUCTION INTERNAL FIXATION WRIST;  Right distal radius open reduction and internal fixation;  Surgeon: Pedro Blanca DO;  [facility]     RECONSTRUCT FOREFOOT WITH METATARSOPHALANGEAL (MTP) FUSION  6/10/2014    Procedure: RECONSTRUCT FOREFOOT WITH METATARSOPHALANGEAL (MTP) FUSION;  Surgeon: Ash Gonzalez DPM;  [facility]     STENT, CORONARY, DEMI       SURGICAL HISTORY OF -   1999/1974    lt knee     SURGICAL HISTORY OF -   10/2004    lithotripsy     SURGICAL HISTORY OF -   11/05    angiogram with stent     VASCULAR SURGERY  11/17/2005    Puncture of iliac artery during and angiogram     [facility] LAPAROSCOPY, SURGICAL PROSTATECTOMY, RETROPUBIC RADICAL, W/NERVE  SPARING  11/30/2004    With full bilateral pelvic lymphadenectomy.  [facility].     [facility] TOTAL KNEE ARTHROPLASTY  05/01/08    Left knee       FAMILY HISTORY:  Family History   Problem Relation Age of Onset     Hypertension Father         Lived to age 87     Connective Tissue Disorder Mother         LUPUS     Heart Disease Mother         Valve replacement     Anxiety Disorder Mother         Lived to age 84     Dementia Mother         Nursing Home (lived to age 86)       SOCIAL HISTORY:  Social History     Socioeconomic History     Marital status:      Spouse name: Alessandra     Number of children: 2     Years of education: Not on file     Highest education level: Not on file   Occupational History     Occupation: Retired   Tobacco Use     Smoking status: Never Smoker     Smokeless tobacco: Never Used   Vaping Use     Vaping Use: Never used   Substance and Sexual Activity     Alcohol use: Yes     Alcohol/week: 8.3 standard drinks     Types: 10 Cans of beer per week     Comment: occasional     Drug use: No     Sexual activity: Yes     Partners: Female     Birth control/protection: Female Surgical   Other Topics Concern      Service Not Asked     Blood Transfusions Not Asked     Caffeine Concern Not Asked     Occupational Exposure Not Asked     Hobby Hazards Not Asked     Sleep Concern Not Asked     Stress Concern Not Asked     Weight Concern Not Asked     Special Diet Not Asked     Back Care Not Asked     Exercise Not Asked     Bike Helmet Not Asked     Seat Belt Not Asked     Self-Exams Not Asked     Parent/sibling w/ CABG, MI or angioplasty before 65F 55M? No   Social History Narrative     Not on file     Social Determinants of Health     Financial Resource Strain: Not on file   Food Insecurity: Not on file   Transportation Needs: Not on file   Physical Activity: Not on file   Stress: Not on file   Social Connections: Not on file   Intimate Partner Violence: Not on file   Housing Stability: Not on file  "      Review of Systems:  Skin:  Negative     Eyes:  Positive for glasses  ENT:  Negative    Respiratory:  Negative    Cardiovascular:  lightheadedness;dizziness;Negative for;palpitations;edema Positive for;chest pain  Gastroenterology: Negative    Genitourinary:  Negative    Musculoskeletal:  Negative    Neurologic:  Negative    Psychiatric:  Positive for anxiety;sleep disturbances  Heme/Lymph/Imm:  Positive for allergies  Endocrine:  Negative      Physical Exam:  Vitals: /66 (BP Location: Right arm, Patient Position: Sitting, Cuff Size: Adult Regular)   Pulse 80   Ht 1.727 m (5' 8\")   Wt 88.9 kg (196 lb)   SpO2 96%   BMI 29.80 kg/m     Please refer to dictation for physical exam    Recent Lab Results: all reviewed today  CBC RESULTS:  Lab Results   Component Value Date    WBC 3.4 (L) 02/26/2022    WBC 6.6 07/02/2021    RBC 4.20 (L) 02/26/2022    RBC 3.25 (L) 07/02/2021    HGB 12.5 (L) 02/26/2022    HGB 9.3 (L) 07/02/2021    HCT 36.6 (L) 02/26/2022    HCT 29.4 (L) 07/02/2021    MCV 87 02/26/2022    MCV 91 07/02/2021    MCH 29.8 02/26/2022    MCH 28.6 07/02/2021    MCHC 34.2 02/26/2022    MCHC 31.6 07/02/2021    RDW 13.3 02/26/2022    RDW 16.3 (H) 07/02/2021     02/26/2022     07/02/2021       BMP RESULTS:  Lab Results   Component Value Date     02/26/2022     07/02/2021    POTASSIUM 4.0 02/26/2022    POTASSIUM 4.1 07/02/2021    CHLORIDE 106 02/26/2022    CHLORIDE 100 07/02/2021    CO2 24 02/26/2022    CO2 29 07/02/2021    ANIONGAP 8 02/26/2022    ANIONGAP 6 07/02/2021    GLC 98 02/26/2022     (H) 07/02/2021    BUN 15 02/26/2022    BUN 23 07/02/2021    CR 1.04 02/26/2022    CR 1.14 07/02/2021    GFRESTIMATED 77 02/26/2022    GFRESTIMATED 65 07/02/2021    GFRESTBLACK 75 07/02/2021    LATRELL 9.2 02/26/2022    LATRELL 8.5 07/02/2021        INR RESULTS:  Lab Results   Component Value Date    INR 1.49 (H) 06/07/2021    INR 1.08 10/28/2017           CC  Aramis Ch MD  [address]  [place] [id number]          Thank you for allowing me to participate in the care of your patient.      Sincerely,     Roma Price PA-C     [facility]  cc:   Aramis Ch MD  [address]  [place] [id number]

## 2022-03-17 NOTE — LETTER
3/17/2022       RE: Quan Murphy  [address]  [facility] [address]     Dear Colleague,    Thank you for referring your patient, Quan Murphy, to the [facility]. Please see a copy of my visit note below.    [id number]      HPI and Plan:   See dictation    Orders this Visit:  No orders of the defined types were placed in this encounter.    No orders of the defined types were placed in this encounter.    There are no discontinued medications.      Encounter Diagnoses   Name Primary?     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)      Chest pain        CURRENT MEDICATIONS:  Current Outpatient Medications   Medication Sig Dispense Refill     ALPRAZolam (XANAX) 0.5 MG tablet Take 1 tablet (0.5 mg) by mouth 2 times daily as needed for anxiety 60 tablet 0     cetirizine (ZYRTEC) 10 MG tablet Take 10 mg by mouth       cevimeline (EVOXAC) 30 MG capsule Take 1 capsule (30 mg) by mouth 3 times daily 120 capsule 11     citalopram (CELEXA) 40 MG tablet TAKE ONE TABLET BY MOUTH ONCE DAILY 90 tablet 0     dronabinol (MARINOL) 2.5 MG capsule Take 1 capsule (2.5 mg) by mouth 2 times daily (before meals) 28 capsule 3     HYDROcodone-acetaminophen (NORCO)  MG per tablet Take 1 tablet by mouth 4 times daily as needed for severe pain 120 tablet 0     HYDROcodone-acetaminophen (NORCO) 5-325 MG tablet Take 1-2 tablets by mouth every 4 hours as needed for moderate to severe pain 10 tablet 0     hydrOXYzine (VISTARIL) 25 MG capsule TAKE ONE CAPSULE BY MOUTH FOUR TIMES A DAY AS NEEDED FOR ANXIETY 120 capsule 4     levothyroxine (SYNTHROID/LEVOTHROID) 137 MCG tablet Take 1 tablet (137 mcg) by mouth daily 30 tablet 11     LORazepam (ATIVAN) 0.5 MG tablet Take 1 tablet (0.5 mg) by mouth every 8 hours as needed for anxiety 8 tablet 0     metoprolol succinate ER (TOPROL-XL) 50 MG 24 hr  tablet Take 1 tablet (50 mg) by mouth daily 90 tablet 2     omeprazole (PRILOSEC) 40 MG DR capsule Take 1 capsule (40 mg) by mouth daily 90 capsule 3     pilocarpine (SALAGEN) 5 MG tablet Take 1 tablet (5 mg) by mouth 3 times daily 90 tablet 3     rosuvastatin (CRESTOR) 40 MG tablet Take 1 tablet (40 mg) by mouth daily 90 tablet 3     zolpidem ER (AMBIEN CR) 12.5 MG CR tablet Take 1 tablet (12.5 mg) by mouth nightly as needed for sleep 30 tablet 3     nitroGLYcerin (NITROSTAT) 0.4 MG sublingual tablet For chest pain place 1 tablet under the tongue every 5 minutes for 3 doses. If symptoms persist 5 minutes after 1st dose call 911. (Patient not taking: Reported on 9/30/2021) 25 tablet 0       ALLERGIES     Allergies   Allergen Reactions     Animal Dander      Azithromycin Nausea and Vomiting     Dust Mites      Pollen Extract      Smoke.        PAST MEDICAL HISTORY:  Past Medical History:   Diagnosis Date     Allergic rhinitis      Allergy, unspecified not elsewhere classified     Seasonal allergies, pollen, dust, smoke and animals     Antiplatelet or antithrombotic long-term use      Anxiety      Arthritis      Chest pain      Chronic sinusitis      Coronary atherosclerosis of unspecified type of vessel, native or graft     Coronary artery disease     Depressive disorder 1995     Gastroesophageal reflux disease 2020    Cleared with medication     Head injury 1954     Hiatal hernia 2015    Right Side     History of blood transfusion 12/15/2004    Prostate Surgery - My own blood     Hyperlipidemia      Hypertension      Inguinal hernia      Kidney problem 10/08/2017    Lithotripsy     Kidney stones      Malignant neoplasm of prostate (H)     Prostate cancer     Prostate cancer (H)      Syncope        PAST SURGICAL HISTORY:  Past Surgical History:   Procedure Laterality Date     ARTHRODESIS FOOT  7/23/2013    Procedure: ARTHRODESIS FOOT;  Great Toe Arthrodesis Left Foot;  Surgeon: Ash Gonzalez DPM;  Location:   OR     ARTHRODESIS FOOT  6/10/2014    Procedure: ARTHRODESIS FOOT;  Surgeon: Ash Gonzalez DPM;  [facility]     BIOPSY  10/1/2004    dermatologist biopsies     COLONOSCOPY  10/7/2013    Procedure: COLONOSCOPY;  Colonoscopy;  Surgeon: Mike Fallon MD;  [facility]     CYSTOSCOPY N/A 2/16/2022    Procedure: CYSTOSCOPY and bladder stone removal;  Surgeon: David Rogers MD;  [facility]     ESOPHAGOSCOPY, GASTROSCOPY, DUODENOSCOPY (EGD), COMBINED N/A 2/12/2020    Procedure: ESOPHAGOGASTRODUODENOSCOPY (EGD);  Surgeon: Sam Escobar MD;  [facility]     EXTRACORPOREAL SHOCK WAVE LITHOTRIPSY (ESWL) Bilateral 10/18/2017    Procedure: EXTRACORPOREAL SHOCK WAVE LITHOTRIPSY (ESWL);  BILATERAL EXTRACORPOREAL SHOCKWAVE LITHOTRIPSY ;  Surgeon: Meir Torres MD;  Location: [facility]     HC CORRECT BUNION,SIMPLE  08/11/2005    x3     HC REMV TOE BENIGN BONE LESN  08/11/2005     HEAD & NECK SURGERY  1954    From a fall     HERNIORRHAPHY INGUINAL  7/3/2013    Procedure: HERNIORRHAPHY INGUINAL;  Open Repair Inguinal hernia Right with mesh ;  Surgeon: Sam Escobar MD;  [facility]     IR GASTROSTOMY TUBE PERCUTANEOUS PLCMNT  6/7/2021     MOHS MICROGRAPHIC PROCEDURE  08/23/11    ear and chin-[facility]     OPEN REDUCTION INTERNAL FIXATION WRIST Right 7/18/2017    Procedure: OPEN REDUCTION INTERNAL FIXATION WRIST;  Right distal radius open reduction and internal fixation;  Surgeon: Pedro Blanca DO;  [facility]     RECONSTRUCT FOREFOOT WITH METATARSOPHALANGEAL (MTP) FUSION  6/10/2014    Procedure: RECONSTRUCT FOREFOOT WITH METATARSOPHALANGEAL (MTP) FUSION;  Surgeon: Ash Gonzalez DPM;  [facility]     STENT, CORONARY, DEMI       SURGICAL HISTORY OF -   1999/1974    lt knee     SURGICAL HISTORY OF -   10/2004    lithotripsy     SURGICAL HISTORY OF -   11/05    angiogram with stent     VASCULAR SURGERY  11/17/2005    Puncture of iliac artery during and  angiogram     [facility] LAPAROSCOPY, SURGICAL PROSTATECTOMY, RETROPUBIC RADICAL, W/NERVE SPARING  11/30/2004    With full bilateral pelvic lymphadenectomy.  [facility].     [facility] TOTAL KNEE ARTHROPLASTY  05/01/08    Left knee       FAMILY HISTORY:  Family History   Problem Relation Age of Onset     Hypertension Father         Lived to age 87     Connective Tissue Disorder Mother         LUPUS     Heart Disease Mother         Valve replacement     Anxiety Disorder Mother         Lived to age 84     Dementia Mother         Nursing Home (lived to age 86)       SOCIAL HISTORY:  Social History     Socioeconomic History     Marital status:      Spouse name: Alessandra     Number of children: 2     Years of education: Not on file     Highest education level: Not on file   Occupational History     Occupation: Retired   Tobacco Use     Smoking status: Never Smoker     Smokeless tobacco: Never Used   Vaping Use     Vaping Use: Never used   Substance and Sexual Activity     Alcohol use: Yes     Alcohol/week: 8.3 standard drinks     Types: 10 Cans of beer per week     Comment: occasional     Drug use: No     Sexual activity: Yes     Partners: Female     Birth control/protection: Female Surgical   Other Topics Concern      Service Not Asked     Blood Transfusions Not Asked     Caffeine Concern Not Asked     Occupational Exposure Not Asked     Hobby Hazards Not Asked     Sleep Concern Not Asked     Stress Concern Not Asked     Weight Concern Not Asked     Special Diet Not Asked     Back Care Not Asked     Exercise Not Asked     Bike Helmet Not Asked     Seat Belt Not Asked     Self-Exams Not Asked     Parent/sibling w/ CABG, MI or angioplasty before 65F 55M? No   Social History Narrative     Not on file     Social Determinants of Health     Financial Resource Strain: Not on file   Food Insecurity: Not on file   Transportation Needs: Not on file   Physical Activity: Not on file   Stress: Not on file   Social Connections: Not on file  "  Intimate Partner Violence: Not on file   Housing Stability: Not on file       Review of Systems:  Skin:  Negative     Eyes:  Positive for glasses  ENT:  Negative    Respiratory:  Negative    Cardiovascular:  lightheadedness;dizziness;Negative for;palpitations;edema Positive for;chest pain  Gastroenterology: Negative    Genitourinary:  Negative    Musculoskeletal:  Negative    Neurologic:  Negative    Psychiatric:  Positive for anxiety;sleep disturbances  Heme/Lymph/Imm:  Positive for allergies  Endocrine:  Negative      Physical Exam:  Vitals: /66 (BP Location: Right arm, Patient Position: Sitting, Cuff Size: Adult Regular)   Pulse 80   Ht 1.727 m (5' 8\")   Wt 88.9 kg (196 lb)   SpO2 96%   BMI 29.80 kg/m     Please refer to dictation for physical exam    Recent Lab Results: all reviewed today  CBC RESULTS:  Lab Results   Component Value Date    WBC 3.4 (L) 02/26/2022    WBC 6.6 07/02/2021    RBC 4.20 (L) 02/26/2022    RBC 3.25 (L) 07/02/2021    HGB 12.5 (L) 02/26/2022    HGB 9.3 (L) 07/02/2021    HCT 36.6 (L) 02/26/2022    HCT 29.4 (L) 07/02/2021    MCV 87 02/26/2022    MCV 91 07/02/2021    MCH 29.8 02/26/2022    MCH 28.6 07/02/2021    MCHC 34.2 02/26/2022    MCHC 31.6 07/02/2021    RDW 13.3 02/26/2022    RDW 16.3 (H) 07/02/2021     02/26/2022     07/02/2021       BMP RESULTS:  Lab Results   Component Value Date     02/26/2022     07/02/2021    POTASSIUM 4.0 02/26/2022    POTASSIUM 4.1 07/02/2021    CHLORIDE 106 02/26/2022    CHLORIDE 100 07/02/2021    CO2 24 02/26/2022    CO2 29 07/02/2021    ANIONGAP 8 02/26/2022    ANIONGAP 6 07/02/2021    GLC 98 02/26/2022     (H) 07/02/2021    BUN 15 02/26/2022    BUN 23 07/02/2021    CR 1.04 02/26/2022    CR 1.14 07/02/2021    GFRESTIMATED 77 02/26/2022    GFRESTIMATED 65 07/02/2021    GFRESTBLACK 75 07/02/2021    LATRELL 9.2 02/26/2022    LATRELL 8.5 07/02/2021        INR RESULTS:  Lab Results   Component Value Date    INR 1.49 (H) " 2021    INR 1.08 10/28/2017         Aramis Ch MD  [address]  [place] [address]    Service Date: 2022    PRIMARY CARDIOLOGIST:  Aramis Ch MD    REASON FOR VISIT:  Stress test, chest pain follow-up.    HISTORY OF PRESENT ILLNESS:  Mr. Murphy is a delightful 71-year-old gentleman with past medical history significant for the followin.  Coronary artery disease with drug-eluting stent to the proximal LAD for what appeared to be a 50% proximal lesion but with a positive FFR with drug-eluting stent placed.  The remainder of his coronary tree had 30% and 40% lesions.  2.  Hyperlipidemia.  3.  Hypertension.  4.  History of prostate cancer, treated with prostatectomy.  5.  History of tonsillar cancer, treated with radiation and chemotherapy.  6.  Anxiety.    Quan comes in as he had been getting some left shoulder and arm discomfort, as well as discomfort in his clavicle.  This is not necessarily exertional, but concerning him.  It was at one point so bad that he presented to the ER and he had been very worried about this stress test.  The stress test was completed and reviewed today.  He went 9 minutes and 35 seconds.  Maximum heart rate of 126, which is in the predicted range.  Blood pressure went up to 189/90.  He achieved 10 METs.  The imaging showed no ischemia or infarct, but the possibility of diaphragmatic attenuation.    He comes in today to discuss those results.  Of note, he had no chest discomfort, shoulder or arm discomfort on his treadmill.  When he was getting those symptoms, it was typically at rest.  If he exercises or does something, he feels well.  He denies orthopnea or PND, fevers, chills or problems with dyspnea on exertion.  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    SOCIAL  HISTORY:  He is .  His wife is a retired RN.  He has about 2 drinks a day.    PHYSICAL EXAMINATION:  GENERAL:  Well-developed, well-nourished gentleman in no acute distress.  HEENT:  Normocephalic, atraumatic.  HEART:  Regular in rate and rhythm.  I do not appreciate murmur, rub or gallop.  LUNGS:  Clear, without wheezes, rales, or rhonchi.  EXTREMITIES:  2+ radial, ulnar and dorsalis pedis and posterior tibialis pulses bilaterally, no edema.    Last labs show a hemoglobin of 12.5.  BMP showing a creatinine of 1.04, BUN 50, potassium 4.0, sodium 138.    ASSESSMENT AND PLAN:  1.  Coronary artery disease, status post drug-eluting stent to the proximal LAD in 2016 with a normal nuclear stress test without infarct or ischemia and an excellent exercise tolerance at 10 METs.  At this point, I suspect this is a true normal stress test and less likely a false negative.  We discussed that today, the fact that his EF looked normal, he can continue with normal activities.  Should the chest discomfort/shoulder and arm discomfort reoccur in an exertional pattern or at rest, would consider doing a right and left heart catheterization for him, but I am quite confident that this is a true normal.  2.  Hypertension, well-controlled.  3.  Dyslipidemia, excellent control.  Last LDL 65, HDL 51, total cholesterol 156.  4.  Multiple comorbidities including squamous cell carcinoma of the tonsil, prostate cancer, bladder stones, orthopedic issues and the patient is exhausted from clinic visits.  I do not think he requires additional workup now and we will have him see Dr. Ch back as scheduled in November, sooner with concerns.  He will get an echocardiogram and labs at that time.    Thank you for allowing me to participate in this delightful patient's care.    Roma Price PA-C        D: 2022   T: 2022   MT: al    Name:     CHUCHO MONTOYA  MRN:      [id number]        Account:      [id number]   :       1950           Service Date: 03/17/2022     Document: [id number]

## 2022-03-17 NOTE — PATIENT INSTRUCTIONS
Thanks for coming into [facility] clinic today.    We discussed: Your stress test is completely normal.      Medication changes:  Continue current medications.      Follow up: with Dr. Ch with your labs and echo in November.        Please call the clinic at  [phone] with any questions or concerns and my our nurses will be happy to help.    Please call [phone] for scheduling.      Reminder: Please bring in all current medications, over the counter supplements and vitamin bottles to your next appointment.

## 2022-03-31 ENCOUNTER — MYC MEDICAL ADVICE (OUTPATIENT)
Dept: FAMILY MEDICINE | Facility: CLINIC | Age: 72
End: 2022-03-31
Payer: MEDICARE

## 2022-03-31 DIAGNOSIS — G47.01 SLEEP DISORDER DUE TO A GENERAL MEDICAL CONDITION, INSOMNIA TYPE: ICD-10-CM

## 2022-03-31 NOTE — TELEPHONE ENCOUNTER
Pending Prescriptions:                       Disp   Refills    zolpidem ER (AMBIEN CR) 12.5 MG CR tablet *30 tab*3        Sig: TAKE ONE TABLET BY MOUTH NIGHTLY AS NEEDED FOR SLEEP      Last Written Prescription Date:  12/06/21  Last Fill Quantity: 30,   # refills: 3  Last Office Visit: 02/10/22  Future Office visit:         Routing refill request to provider for review/approval because:  Drug not on the [facility] refill protocol     Aniyah Medeiros RN

## 2022-03-31 NOTE — TELEPHONE ENCOUNTER
See CuPcAk[employer] & other things you bake refill request. Will close this encounter    Aniyah Medeiros RN

## 2022-04-01 RX ORDER — ZOLPIDEM TARTRATE 12.5 MG/1
TABLET, FILM COATED, EXTENDED RELEASE ORAL
Qty: 30 TABLET | Refills: 3 | Status: SHIPPED | OUTPATIENT
Start: 2022-04-01 | End: 2022-07-17

## 2022-04-03 ENCOUNTER — MYC REFILL (OUTPATIENT)
Dept: INTERNAL MEDICINE | Facility: CLINIC | Age: 72
End: 2022-04-03
Payer: MEDICARE

## 2022-04-03 DIAGNOSIS — G89.3 NEOPLASM RELATED PAIN: ICD-10-CM

## 2022-04-04 RX ORDER — HYDROCODONE BITARTRATE AND ACETAMINOPHEN 10; 325 MG/1; MG/1
1 TABLET ORAL 4 TIMES DAILY PRN
Qty: 120 TABLET | Refills: 0 | Status: SHIPPED | OUTPATIENT
Start: 2022-04-06 | End: 2022-04-06

## 2022-04-04 NOTE — TELEPHONE ENCOUNTER
Pending Prescriptions:                       Disp   Refills    HYDROcodone-acetaminophen (NORCO)  M*120 ta*0        Sig: Take 1 tablet by mouth 4 times daily as needed for           severe pain        Routing refill request to provider for review/approval because:  Drug not on the [facility] refill protocol   Elan Neri, MELLYN, RN, PHN  Casual Clinic RN for [place]  April 4, 2022

## 2022-04-06 DIAGNOSIS — C09.9 SQUAMOUS CELL CARCINOMA OF LEFT TONSIL (H): Primary | ICD-10-CM

## 2022-04-06 DIAGNOSIS — N21.0 BLADDER STONES: ICD-10-CM

## 2022-04-06 DIAGNOSIS — G89.3 NEOPLASM RELATED PAIN: ICD-10-CM

## 2022-04-06 RX ORDER — HYDROCODONE BITARTRATE AND ACETAMINOPHEN 5; 325 MG/1; MG/1
1-2 TABLET ORAL EVERY 4 HOURS PRN
Qty: 10 TABLET | Refills: 0 | Status: CANCELLED | OUTPATIENT
Start: 2022-04-06

## 2022-04-06 RX ORDER — HYDROCODONE BITARTRATE AND ACETAMINOPHEN 10; 325 MG/1; MG/1
1 TABLET ORAL 4 TIMES DAILY PRN
Qty: 120 TABLET | Refills: 0 | Status: SHIPPED | OUTPATIENT
Start: 2022-04-06 | End: 2022-05-01

## 2022-04-21 ENCOUNTER — OFFICE VISIT (OUTPATIENT)
Dept: FAMILY MEDICINE | Facility: CLINIC | Age: 72
End: 2022-04-21
Payer: MEDICARE

## 2022-04-21 VITALS
DIASTOLIC BLOOD PRESSURE: 62 MMHG | TEMPERATURE: 98.5 F | RESPIRATION RATE: 14 BRPM | HEIGHT: 68 IN | WEIGHT: 198.19 LBS | HEART RATE: 82 BPM | OXYGEN SATURATION: 97 % | BODY MASS INDEX: 30.04 KG/M2 | SYSTOLIC BLOOD PRESSURE: 100 MMHG

## 2022-04-21 DIAGNOSIS — F41.9 ANXIETY: ICD-10-CM

## 2022-04-21 DIAGNOSIS — Z95.5 STATUS POST INSERTION OF DRUG-ELUTING STENT INTO LEFT ANTERIOR DESCENDING ARTERY FOR CORONARY ARTERY DISEASE: ICD-10-CM

## 2022-04-21 DIAGNOSIS — F11.90 CHRONIC, CONTINUOUS USE OF OPIOIDS: Primary | ICD-10-CM

## 2022-04-21 PROCEDURE — 99215 OFFICE O/P EST HI 40 MIN: CPT | Performed by: FAMILY MEDICINE

## 2022-04-21 NOTE — PROGRESS NOTES
Assessment & Plan     Chronic, continuous use of opioids  In today to discuss his use of Norco.  He feels it just barely covers him.  He uses it for a number of issues.  He has had recent history of squamous cell carcinoma of the tonsil.  Subsequent treatments have required more use.  He has had kidney stones some.  He has had chronic facial pain from chronic sinusitis.  He has had prostate cancer.  He can some may be increasing this.  He has gone from 7.5 mg to 10 mg in the last couple of years 4 times a day.  I explained to him that this is not going to escalate more.  And he needs to really try to start going on more of a as needed basis for use of this.  And I have emphasized again we need him to get involved in a pain clinic.  Other options including medical cannabis may be something for him to consider.  He is going to consider this after he has some further medical procedures done here.    Anxiety  He has high anxiety.  Wants to go up on his Xanax.  He states he was on for a day at one time we lowered him to 3 and now 2.  He wants to go back up to 3.  Long discussion with him that I do not want to do this.  Talk to him about maybe switching to Klonopin but he states he wants to stick with his Xanax for now.  Suggested counseling to help him with possible other methods to deal with his anxiety besides medication.  He is not keen on this at this time.  I had a long discussion with him that he cannot go up on this and he is also on Celexa which should be helping.  BuSpar might be a consideration.  He is very concerned by the fact that I will be retiring in 8 months and he does not feel that he will be able to carry on with his current medicines.  I told him this is a very valid concern and we need to start now to get him down on these medicines and in a pain clinic.    Status post insertion of drug-eluting stent into left anterior descending artery for coronary artery disease  Discussion here to this is made  "him very anxious.  He just saw cardiology and had a stress test done and they cleared him because he was having some left chest pain going to his shoulder.  He does admit he thinks it is from the anxiety.  And that is one of the reasons he thought he should be able to go up on the Xanax.  Long discussion here and I reviewed the cardiology's visit with him.        50 minutes spent on the date of the encounter doing chart review, review of outside records, review of test results, patient visit, documentation and Discussions as noted above with patient face-to-face.        BMI:   Estimated body mass index is 30.13 kg/m  as calculated from the following:    Height as of this encounter: 1.727 m (5' 8\").    Weight as of this encounter: 89.9 kg (198 lb 3 oz).           No follow-ups on file.    Jose Hernandez MD  [facility] Wolff is a 71 year old who presents for the following health issues : See discussions above in A&P.    HPI     DISCUSS CARDIOLOGY VISIT/ANXIETY    *  Discuss visit with Dr. Roma Price, Cardiologist on 03/17/2022, stress test.      Still having left shoulder pain with chest tightness.    *  Shakiness when waking up in the morning, per patient most likely anxiety?                  Review of Systems   Constitutional, HEENT, cardiovascular, pulmonary, GI, , musculoskeletal, neuro, skin, endocrine and psych systems are negative, except as otherwise noted.      Objective    There were no vitals taken for this visit.  There is no height or weight on file to calculate BMI.  Physical Exam   GENERAL: alert, pale and fatigued  PSYCH: affect flat, anxious and fatigued                "

## 2022-05-01 ENCOUNTER — MYC REFILL (OUTPATIENT)
Dept: FAMILY MEDICINE | Facility: CLINIC | Age: 72
End: 2022-05-01
Payer: MEDICARE

## 2022-05-01 DIAGNOSIS — G89.3 NEOPLASM RELATED PAIN: ICD-10-CM

## 2022-05-02 ENCOUNTER — MYC MEDICAL ADVICE (OUTPATIENT)
Dept: FAMILY MEDICINE | Facility: CLINIC | Age: 72
End: 2022-05-02
Payer: MEDICARE

## 2022-05-04 RX ORDER — HYDROCODONE BITARTRATE AND ACETAMINOPHEN 10; 325 MG/1; MG/1
1 TABLET ORAL 4 TIMES DAILY PRN
Qty: 120 TABLET | Refills: 0 | Status: SHIPPED | OUTPATIENT
Start: 2022-05-04 | End: 2022-06-01

## 2022-05-06 DIAGNOSIS — F41.9 ANXIETY: ICD-10-CM

## 2022-05-06 NOTE — TELEPHONE ENCOUNTER
Xanax      Last Written Prescription Date:  4/11/2022  Last Fill Quantity: 60,   # refills: 0  Last Office Visit: 4/21/2022  Future Office visit:    Next 5 appointments (look out 90 days)      Aug 03, 2022  1:00 PM  Return Visit with David Rogers MD  [facility] ([facility] ) [address] [place] [id number] [phone]             Routing refill request to provider for review/approval because:  Drug not on the [facility], [facility] or [facility] refill protocol or controlled substance

## 2022-05-08 ENCOUNTER — MYC REFILL (OUTPATIENT)
Dept: INTERNAL MEDICINE | Facility: CLINIC | Age: 72
End: 2022-05-08
Payer: MEDICARE

## 2022-05-08 DIAGNOSIS — F41.9 ANXIETY: ICD-10-CM

## 2022-05-09 RX ORDER — ALPRAZOLAM 0.5 MG
0.5 TABLET ORAL 2 TIMES DAILY PRN
Qty: 60 TABLET | Refills: 0 | OUTPATIENT
Start: 2022-05-09

## 2022-05-09 RX ORDER — ALPRAZOLAM 0.5 MG
TABLET ORAL
Qty: 60 TABLET | Refills: 0 | Status: SHIPPED | OUTPATIENT
Start: 2022-05-09 | End: 2022-06-02

## 2022-05-09 NOTE — TELEPHONE ENCOUNTER
Prescription was sent 5/9/2022 for #60 with 0 refills.  Pharmacy notified via E-Prescribe refusal.     MELLY CarlosN, RN  [facility]

## 2022-05-11 NOTE — PROGRESS NOTES
Dear Dr. Christensen:    I had the pleasure of seeing Quan Murphy in follow-up today at the [facility].     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 The patient has a history of foreign body sensation on the left side starting a few months ago. He tried gargling and using a neti pot with no improvement. He also developed left neck discomfort which radiates into his head. He saw primary care on 3/4/2021. He was treated with a course of antibiotics with no improvement. He saw seen by Dr Christensen on 3/22/2021. At that time he had an enlarged left tonsil. A biopsy was performed at that time which was consistent with a p16+ SCC. He had a CT scan on 3/23/2021 which showed a 2.9 x 2.7 x 3.5 cm left tonsil cancer, involving the soft palate, left level II and III lymphadenopathy. He had a PET scan performed today which showed the primary tumor in the tonsil and soft palate, left-sided lymphadenopathy with SHAYNE present (3.4 x 2.0 cm, 1.8 x 1.8 cm), right-sided node (1.4 x 0.9 cm) with FDG activity, no distant disease.    He was treated with definitive chemoradiation from 4/28/2021 to 6/12/2021. He had a total of 6996 cGy under the care of Dr Robbins. He had weekly cisplatin for a total of 5 cycles under the care of Dr Schrader. He had a difficult time with pain control during his treatment secondary to his chronic pain medication use and pain medications were managed by palliative care. He was hospitalized from 6/2/2021 to 6/9/2021 for failure to thrive. He had reactive PEG tube placement on 6/7/2021 due to inability to maintain adequate nutrition and intolerance of the idea of an NG tube.  His PET scan was negative in September 2021. Dr Christensen performed a myringotomy in November 2021 for his ear issues.       Interval history:  He comes in today for follow-up. He was last seen in February 2022. He had cystoscopy with  lithotripsy of bladder stones in February 2022. He was in the ED in February 2022 for chest pain, negative workup, thought to be anxiety related. He had a visit with Dr Schrader in March 2022. He saw cardiology in March 2022, had negative stress test. He saw PCP in April 2022, discussed cutting back on pain meds, seeing pain clinic, recommended counseling for his anxiety. He comes in with repeat imaging.    He says today that he is feeling ok. His biggest at this time is he is having symptoms from his allergies. He says his primary is retiring at the end of his year and he is concerned about finding someone new. He says his throat is doing ok. He is eating and drinking without issue. He says his taste is still not normal. He feels like there is nothing that he can't eat or drink. He has no sticking of food. He is doing his exercises. He is doing the salt/soda rinses. He says his weight is stable. He has no ear pain. His hearing issues have resolved without tubes. He has no sore throat or odynophagia. He still feels like he still has lymphedema. He is doing his lymphedema daily. He is doing 2 protein shakes per day.        MEDICATIONS:     Current Outpatient Medications   Medication Sig Dispense Refill     ALPRAZolam (XANAX) 0.5 MG tablet TAKE ONE TABLET BY MOUTH TWICE A DAY AS NEEDED FOR ANXIETY 60 tablet 0     cetirizine (ZYRTEC) 10 MG tablet Take 10 mg by mouth       cevimeline (EVOXAC) 30 MG capsule Take 1 capsule (30 mg) by mouth 3 times daily 120 capsule 11     citalopram (CELEXA) 40 MG tablet TAKE ONE TABLET BY MOUTH ONCE DAILY 90 tablet 0     dronabinol (MARINOL) 2.5 MG capsule Take 1 capsule (2.5 mg) by mouth 2 times daily (before meals) 28 capsule 3     HYDROcodone-acetaminophen (NORCO)  MG per tablet Take 1 tablet by mouth 4 times daily as needed for severe pain 120 tablet 0     hydrOXYzine (VISTARIL) 25 MG capsule TAKE ONE CAPSULE BY MOUTH FOUR TIMES A DAY AS NEEDED FOR ANXIETY 120 capsule 4      levothyroxine (SYNTHROID/LEVOTHROID) 112 MCG tablet Take 1 tablet (112 mcg) by mouth daily 30 tablet 11     levothyroxine (SYNTHROID/LEVOTHROID) 137 MCG tablet Take 1 tablet (137 mcg) by mouth daily 30 tablet 11     metoprolol succinate ER (TOPROL-XL) 50 MG 24 hr tablet Take 1 tablet (50 mg) by mouth daily 90 tablet 2     omeprazole (PRILOSEC) 40 MG DR capsule Take 1 capsule (40 mg) by mouth daily 90 capsule 3     pilocarpine (SALAGEN) 5 MG tablet Take 1 tablet (5 mg) by mouth 3 times daily 90 tablet 3     rosuvastatin (CRESTOR) 40 MG tablet Take 1 tablet (40 mg) by mouth daily 90 tablet 3     zolpidem ER (AMBIEN CR) 12.5 MG CR tablet TAKE ONE TABLET BY MOUTH NIGHTLY AS NEEDED FOR SLEEP 30 tablet 3     nitroGLYcerin (NITROSTAT) 0.4 MG sublingual tablet For chest pain place 1 tablet under the tongue every 5 minutes for 3 doses. If symptoms persist 5 minutes after 1st dose call 911. (Patient not taking: No sig reported) 25 tablet 0       ALLERGIES:    Allergies   Allergen Reactions     Animal Dander      Azithromycin Nausea and Vomiting     Dust Mites      Pollen Extract      Smoke.        HABITS/SOCIAL HISTORY:   Nonsmoker. Chewing tobacco use occasionally when fishing. . Drink 2-3 beers per day.   Lives with wife (nurse).   Retired .     Social History     Socioeconomic History     Marital status:      Spouse name: Alessandra     Number of children: 2     Years of education: Not on file     Highest education level: Not on file   Occupational History     Occupation: Retired   Tobacco Use     Smoking status: Never Smoker     Smokeless tobacco: Never Used   Vaping Use     Vaping Use: Never used   Substance and Sexual Activity     Alcohol use: Yes     Types: 10 Cans of beer per week     Comment: Moderate     Drug use: No     Sexual activity: Yes     Partners: Female     Birth control/protection: Female Surgical   Other Topics Concern      Service Not Asked     Blood Transfusions Not  Asked     Caffeine Concern Not Asked     Occupational Exposure Not Asked     Hobby Hazards Not Asked     Sleep Concern Not Asked     Stress Concern Not Asked     Weight Concern Not Asked     Special Diet Not Asked     Back Care Not Asked     Exercise Not Asked     Bike Helmet Not Asked     Seat Belt Not Asked     Self-Exams Not Asked     Parent/sibling w/ CABG, MI or angioplasty before 65F 55M? No   Social History Narrative     Not on file     Social Determinants of Health     Financial Resource Strain: Not on file   Food Insecurity: Not on file   Transportation Needs: Not on file   Physical Activity: Not on file   Stress: Not on file   Social Connections: Not on file   Intimate Partner Violence: Not on file   Housing Stability: Not on file       PAST MEDICAL HISTORY:   Past Medical History:   Diagnosis Date     Allergic rhinitis      Allergy, unspecified not elsewhere classified     Seasonal allergies, pollen, dust, smoke and animals     Antiplatelet or antithrombotic long-term use      Anxiety      Arthritis      Chest pain      Chronic sinusitis      Coronary atherosclerosis of unspecified type of vessel, native or graft     Coronary artery disease     Depressive disorder 1995     Gastroesophageal reflux disease 2020    Cleared with medication     Head injury 1954     Hiatal hernia 2015    Right Side     History of blood transfusion 12/15/2004    Prostate Surgery - My own blood     Hyperlipidemia      Hypertension      Inguinal hernia      Kidney problem 10/08/2017    Lithotripsy     Kidney stones      Malignant neoplasm of prostate (H)     Prostate cancer     Prostate cancer (H)      Syncope         PAST SURGICAL HISTORY:   Past Surgical History:   Procedure Laterality Date     ARTHRODESIS FOOT  7/23/2013    Procedure: ARTHRODESIS FOOT;  Great Toe Arthrodesis Left Foot;  Surgeon: Ash Gonzalez DPM;  Location: [facility] OR     ARTHRODESIS FOOT  6/10/2014    Procedure: ARTHRODESIS FOOT;  Surgeon: Ash Gonzalez  LUIS Champion;  Location: [facility] OR     BIOPSY  10/1/2004    dermatologist biopsies     COLONOSCOPY  10/7/2013    Procedure: COLONOSCOPY;  Colonoscopy;  Surgeon: Mike Fallon MD;  Location: [facility]     CYSTOSCOPY N/A 2/16/2022    Procedure: CYSTOSCOPY and bladder stone removal;  Surgeon: David Rogers MD;  Location: [facility]     ESOPHAGOSCOPY, GASTROSCOPY, DUODENOSCOPY (EGD), COMBINED N/A 2/12/2020    Procedure: ESOPHAGOGASTRODUODENOSCOPY (EGD);  Surgeon: Sam Escobar MD;  Location: [facility]     EXTRACORPOREAL SHOCK WAVE LITHOTRIPSY (ESWL) Bilateral 10/18/2017    Procedure: EXTRACORPOREAL SHOCK WAVE LITHOTRIPSY (ESWL);  BILATERAL EXTRACORPOREAL SHOCKWAVE LITHOTRIPSY ;  Surgeon: Meir Torres MD;  Location: [facility] OR     HC CORRECT BUNION,SIMPLE  08/11/2005    x3     HC REMV TOE BENIGN BONE LESN  08/11/2005     HEAD & NECK SURGERY  1954    From a fall     HERNIORRHAPHY INGUINAL  7/3/2013    Procedure: HERNIORRHAPHY INGUINAL;  Open Repair Inguinal hernia Right with mesh ;  Surgeon: Sam Escobar MD;  Location: [facility]     IR GASTROSTOMY TUBE PERCUTANEOUS PLCMNT  6/7/2021     MOHS MICROGRAPHIC PROCEDURE  08/23/11    ear and chin-[facility]     OPEN REDUCTION INTERNAL FIXATION WRIST Right 7/18/2017    Procedure: OPEN REDUCTION INTERNAL FIXATION WRIST;  Right distal radius open reduction and internal fixation;  Surgeon: Pedro Blanca DO;  Location: [facility]     RECONSTRUCT FOREFOOT WITH METATARSOPHALANGEAL (MTP) FUSION  6/10/2014    Procedure: RECONSTRUCT FOREFOOT WITH METATARSOPHALANGEAL (MTP) FUSION;  Surgeon: Ash Gonzalez DPM;  Location: [facility]     STENT, CORONARY, DEMI       SURGICAL HISTORY OF -   1999/1974    lt knee     SURGICAL HISTORY OF -   10/2004    lithotripsy     SURGICAL HISTORY OF -   11/05    angiogram with stent     VASCULAR SURGERY  11/17/2005    Puncture of iliac artery during and angiogram     ZZC LAPAROSCOPY, SURGICAL PROSTATECTOMY, RETROPUBIC RADICAL, W/NERVE  "SPARING  11/30/2004    With full bilateral pelvic lymphadenectomy.  F-[facility].     ZZC TOTAL KNEE ARTHROPLASTY  05/01/08    Left knee       FAMILY HISTORY:    Family History   Problem Relation Age of Onset     Hypertension Father         Lived to age 87     Connective Tissue Disorder Mother         LUPUS     Heart Disease Mother         Valve replacement     Anxiety Disorder Mother         Lived to age 84     Dementia Mother         Nursing Home (lived to age 86)       REVIEW OF SYSTEMS:  12 point ROS was negative other than the symptoms noted above in the HPI.  Patient Supplied Answers to Review of Systems  [facility] ENT ROS 5/7/2022   Constitutional Problems with sleep   Neurology -   Psychology -   Ears, Nose, Throat Nasal congestion or drainage   Allergy/Immunology Allergies or hay fever   Hematologic -         PHYSICAL EXAMINATION:   /78 (BP Location: Right arm, Patient Position: Sitting, Cuff Size: Adult Regular)   Pulse 73   Temp 97.3  F (36.3  C) (Temporal)   Ht 1.727 m (5' 8\")   Wt 88.5 kg (195 lb)   BMI 29.65 kg/m     Appearance:   normal; NAD, age-appropriate appearance, well-developed, normal habitus   Communication:   normal; communicates verbally, normal voice quality   Head/Face:   inspection -  Normal; no scars or visible lesions   Salivary glands -  Normal size, no tenderness, swelling, or palpable masses   Facial strength -  Normal and symmetric    Skin:  normal, no rash   Ears:  auricle (AD) -  normal  EAC (AD) -  normal  TM (AD) -  Normal, no effusion  auricle (AS) -  normal  EAC (AS) -  normal  TM (AS) -  Small serous effusion  Normal clinical speech reception   Nose:  Ext. inspection -  Normal  Internal Inspection -  Normal mucosa, septum, and turbinates   Nasopharynx:  normal mucosa, no masses   Oral Cavity:  lips -  Normal mucosa, oral competence, and stoma size   Age-appropriate dentition, healthy gingival mucosa  Mildly dry mucus membranes   Hard palate, buccal, floor of mouth mucosa with " xerostomia   Tongue - normal movement, no masses or mucosal lesions   Oropharynx:  There are radiation changes to the mucosa throughout with scattered telangiectasias  Retraction of the mucosa of the palate and tonsillar fossa, left greater than right  There are no secretions in the vallecula  Palpation is limited of the oropharynx due to gag reflex, no palpable masses, no mucosal lesions  Base of tongue - no masses or mucosal lesions  No residual disease   Hypopharynx:  Normal pyriform sinus    No pooled secretions    Larynx:  Epiglottis, false vocal cords, true vocal cords normal in appearance, bilaterally mobile cords   The left arytenoid and AE fold have mild radiation edema that is not obstructive    Neck: No palpable masses  Mild submental lymphedema  Mild fibrosis bilateral neck   Lymphatic:  No palpable masses   Cardiovascular:  warm, pink, well-perfused extremities without swelling, tenderness, or edema   Respiratory:  Normal respiratory effort, no stridor   Neuro/Psych.:  mood/affect -  normal  mental status -  normal         PROCEDURES:   Flexible fiberoptic laryngoscopy: Scope exam was indicated due to tonsil cancer. Verbal consent was obtained. The nasal cavity was prepped with an aerosolized solution of topical anesthetic and vasoconstrictive agent. The scope was passed through the anterior nasal cavity and advanced. Inspection of the nasopharynx revealed no gross abnormality.  There are no masses or mucosal lesions in the left tonsil/oropharynx.  The epiglottis has very minimal thickening.  There is radiation edema of the left AE fold and left arytenoid.  This is not obstructive.  The vocal cords are mobile bilaterally.  The piriform sinuses are clear. The airway is patent. Procedure tolerated well with no immediate complications noted.      RESULTS REVIEWED:   Care discussed with SLP - discharged today    I reviewed notes from urology, cardiology, ED, PCP    CT neck and chest imaging independently  reviewed     CT neck and chest report reviewed     TSH/T4 reviewed      IMPRESSION AND PLAN:   Quan Murphy is a 71 year old man with a 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 He completed definitive chemoradiation on 6/12/2021.    He is overall doing well. He     He was seen by SLP team today who discharged him today. He should continue doing the exercises.     He is doing his lymphedema therapy at home.    His TSH was normal on recheck in November 2021. He is on synthroid. He is due for recheck of labs. He went to the lab today, synthroid dose too high, new script sent to pharmacy. Will recheck labs in 3 months.    He had repeat imaging today. We will send results to [facility].    He will return to [facility] clinic in 3 months.    Thank you very much for the opportunity to participate in the care of your patient.      Ligia Fang MD  Otolaryngology- Head & Neck Surgery      This note was dictated with voice recognition software and then edited. Please excuse any unintentional errors.         CC:  Ahmet Robbins MD  Department of Radiation Oncology  [facility]      Too Christensen MD  [address] [place] [address]      Shanthi Schrader MD  Department of Medical Oncology  [facility]

## 2022-05-13 ENCOUNTER — THERAPY VISIT (OUTPATIENT)
Dept: SPEECH THERAPY | Facility: CLINIC | Age: 72
End: 2022-05-13
Payer: MEDICARE

## 2022-05-13 ENCOUNTER — ANCILLARY PROCEDURE (OUTPATIENT)
Dept: CT IMAGING | Facility: CLINIC | Age: 72
End: 2022-05-13
Attending: OTOLARYNGOLOGY
Payer: MEDICARE

## 2022-05-13 ENCOUNTER — LAB (OUTPATIENT)
Dept: LAB | Facility: CLINIC | Age: 72
End: 2022-05-13
Payer: MEDICARE

## 2022-05-13 ENCOUNTER — OFFICE VISIT (OUTPATIENT)
Dept: OTOLARYNGOLOGY | Facility: CLINIC | Age: 72
End: 2022-05-13
Payer: MEDICARE

## 2022-05-13 VITALS
BODY MASS INDEX: 29.55 KG/M2 | HEIGHT: 68 IN | HEART RATE: 73 BPM | TEMPERATURE: 97.3 F | WEIGHT: 195 LBS | DIASTOLIC BLOOD PRESSURE: 78 MMHG | SYSTOLIC BLOOD PRESSURE: 127 MMHG

## 2022-05-13 DIAGNOSIS — E03.4 HYPOTHYROIDISM DUE TO ACQUIRED ATROPHY OF THYROID: ICD-10-CM

## 2022-05-13 DIAGNOSIS — C09.9 SQUAMOUS CELL CARCINOMA OF TONSIL (H): ICD-10-CM

## 2022-05-13 DIAGNOSIS — C09.9 SQUAMOUS CELL CARCINOMA OF TONSIL (H): Primary | ICD-10-CM

## 2022-05-13 DIAGNOSIS — R13.12 OROPHARYNGEAL DYSPHAGIA: Primary | ICD-10-CM

## 2022-05-13 LAB
CREAT BLD-MCNC: 1.6 MG/DL (ref 0.7–1.3)
GFR SERPL CREATININE-BSD FRML MDRD: 46 ML/MIN/1.73M2
T4 FREE SERPL-MCNC: 1.21 NG/DL (ref 0.76–1.46)
TSH SERPL DL<=0.005 MIU/L-ACNC: 0.02 MU/L (ref 0.4–4)

## 2022-05-13 PROCEDURE — 84443 ASSAY THYROID STIM HORMONE: CPT | Performed by: PATHOLOGY

## 2022-05-13 PROCEDURE — 70491 CT SOFT TISSUE NECK W/DYE: CPT | Mod: MG | Performed by: STUDENT IN AN ORGANIZED HEALTH CARE EDUCATION/TRAINING PROGRAM

## 2022-05-13 PROCEDURE — 84439 ASSAY OF FREE THYROXINE: CPT | Performed by: PATHOLOGY

## 2022-05-13 PROCEDURE — 36415 COLL VENOUS BLD VENIPUNCTURE: CPT | Performed by: PATHOLOGY

## 2022-05-13 PROCEDURE — 82565 ASSAY OF CREATININE: CPT | Performed by: PATHOLOGY

## 2022-05-13 PROCEDURE — 31575 DIAGNOSTIC LARYNGOSCOPY: CPT | Performed by: OTOLARYNGOLOGY

## 2022-05-13 PROCEDURE — 99214 OFFICE O/P EST MOD 30 MIN: CPT | Mod: 25 | Performed by: OTOLARYNGOLOGY

## 2022-05-13 PROCEDURE — 71260 CT THORAX DX C+: CPT | Mod: MG | Performed by: RADIOLOGY

## 2022-05-13 PROCEDURE — 92526 ORAL FUNCTION THERAPY: CPT | Mod: GN | Performed by: SPEECH-LANGUAGE PATHOLOGIST

## 2022-05-13 PROCEDURE — G1004 CDSM NDSC: HCPCS | Mod: GC | Performed by: RADIOLOGY

## 2022-05-13 PROCEDURE — G1004 CDSM NDSC: HCPCS | Performed by: STUDENT IN AN ORGANIZED HEALTH CARE EDUCATION/TRAINING PROGRAM

## 2022-05-13 RX ORDER — IOPAMIDOL 755 MG/ML
97 INJECTION, SOLUTION INTRAVASCULAR ONCE
Status: COMPLETED | OUTPATIENT
Start: 2022-05-13 | End: 2022-05-13

## 2022-05-13 RX ADMIN — IOPAMIDOL 97 ML: 755 INJECTION, SOLUTION INTRAVASCULAR at 14:34

## 2022-05-13 ASSESSMENT — PAIN SCALES - GENERAL: PAINLEVEL: NO PAIN (0)

## 2022-05-13 NOTE — PATIENT INSTRUCTIONS
F/u in multi D clinic in conjunction with Dr Robbins in 3 months     Schedule lab work for TSH - can be done today if available  
The patient is a 53y Male complaining of fall from height.

## 2022-05-13 NOTE — NURSING NOTE
"Chief Complaint   Patient presents with     RECHECK     Follow up       Blood pressure 127/78, pulse 73, temperature 97.3  F (36.3  C), temperature source Temporal, height 1.727 m (5' 8\"), weight 88.5 kg (195 lb).    Rhea Ovalles, EMT    "

## 2022-05-13 NOTE — PROGRESS NOTES
[facility]    Outpatient Speech Language Pathology Discharge Note  Patient: Quan Murphy  : 1950    Beginning/End Dates of Reporting Period:  2021 to 2022    Referring Provider: Dr. Ligia Fang    Therapy Diagnosis: Oropharyngeal dysphagia    Client Self Report: Quan Murphy is a 71-year-old male with a lH1T9H3 p16+ SCC of the left tonsil s/p definitive chemoXRT finishing on 21. He had PEG placed near the end of treatment, but has since had this removed. He returns today for dysphagia follow up and is seen in conjunction with ENT clinic visit. Reports swallowing is going well.          Goals:  Goal Identifier Diet   Goal Description 1. Pt will tolerate regular textures and thin liquids with no overt clinical s/sx of penetration/aspiration in 100% of PO trials.    Target Date 22   Date Met  22   Progress (detail required for progress note): Pt reports swallowing is going well. Denies coughing/TC and feeling of stasis with PO intake. He is able to swallow everything he wants. His taste is slowly improving. His wife made him a steak and some brown sugar carrots and he was able to taste these items. Trained pt on trajectory of taste recovery for the first 2 years after XRT and the importance of trying bites/sips of a variety of flavors. Pt also endorses a slight improvement in xerostomia, as well. Trained pt to continue with regular textures/thin liquids with use of safe swallow strategies. Trained pt to let MD know should he experience any new or worsening trouble swallowing.     Goal Identifier Exercises   Goal Description 2. Pt will complete 10 reps of 5/5 oropharyngeal and jaw strengthening/ROM exercises with minimal written and/or verbal cues.    Target Date 22   Date Met  22   Progress (detail required for progress note): Pt reports completing exercises daily.  Retrained rationale and importance of continuing home exercise program for the first 2 years after XRT at a frequency of 2-3x/day. Pt agreeable.         Plan:  Discharge from therapy.    Discharge:    Reason for Discharge: Patient has met all goals.    Equipment Issued: None    Discharge Plan: Patient to continue home program. Pt to let MD know should he experience new or worsening trouble swallowing, at which point re-evaluation would be warranted.

## 2022-05-13 NOTE — LETTER
5/13/2022       RE: Quan Murphy  [address]  [facility] [address]     Dear Colleague,    Thank you for referring your patient, Quan Murphy, to the [facility] EAR [facility]SE AND THROAT CLINIC [place] at [facility]. Please see a copy of my visit note below.    Dear Dr. Christensen:    I had the pleasure of seeing Quan Murphy in follow-up today at the [facility].     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 The patient has a history of foreign body sensation on the left side starting a few months ago. He tried gargling and using a neti pot with no improvement. He also developed left neck discomfort which radiates into his head. He saw primary care on 3/4/2021. He was treated with a course of antibiotics with no improvement. He saw seen by Dr Christensen on 3/22/2021. At that time he had an enlarged left tonsil. A biopsy was performed at that time which was consistent with a p16+ SCC. He had a CT scan on 3/23/2021 which showed a 2.9 x 2.7 x 3.5 cm left tonsil cancer, involving the soft palate, left level II and III lymphadenopathy. He had a PET scan performed today which showed the primary tumor in the tonsil and soft palate, left-sided lymphadenopathy with SHAYNE present (3.4 x 2.0 cm, 1.8 x 1.8 cm), right-sided node (1.4 x 0.9 cm) with FDG activity, no distant disease.    He was treated with definitive chemoradiation from 4/28/2021 to 6/12/2021. He had a total of 6996 cGy under the care of Dr Robbins. He had weekly cisplatin for a total of 5 cycles under the care of Dr Schrader. He had a difficult time with pain control during his treatment secondary to his chronic pain medication use and pain medications were managed by palliative care. He was hospitalized from 6/2/2021 to 6/9/2021 for failure to thrive. He had reactive PEG tube placement on 6/7/2021 due to  inability to maintain adequate nutrition and intolerance of the idea of an NG tube.  His PET scan was negative in September 2021. Dr Christensen performed a myringotomy in November 2021 for his ear issues.       Interval history:  He comes in today for follow-up. He was last seen in February 2022. He had cystoscopy with lithotripsy of bladder stones in February 2022. He was in the ED in February 2022 for chest pain, negative workup, thought to be anxiety related. He had a visit with Dr Schrader in March 2022. He saw cardiology in March 2022, had negative stress test. He saw PCP in April 2022, discussed cutting back on pain meds, seeing pain clinic, recommended counseling for his anxiety. He comes in with repeat imaging.    He says today that he is feeling ok. His biggest at this time is he is having symptoms from his allergies. He says his primary is retiring at the end of his year and he is concerned about finding someone new. He says his throat is doing ok. He is eating and drinking without issue. He says his taste is still not normal. He feels like there is nothing that he can't eat or drink. He has no sticking of food. He is doing his exercises. He is doing the salt/soda rinses. He says his weight is stable. He has no ear pain. His hearing issues have resolved without tubes. He has no sore throat or odynophagia. He still feels like he still has lymphedema. He is doing his lymphedema daily. He is doing 2 protein shakes per day.        MEDICATIONS:     Current Outpatient Medications   Medication Sig Dispense Refill     ALPRAZolam (XANAX) 0.5 MG tablet TAKE ONE TABLET BY MOUTH TWICE A DAY AS NEEDED FOR ANXIETY 60 tablet 0     cetirizine (ZYRTEC) 10 MG tablet Take 10 mg by mouth       cevimeline (EVOXAC) 30 MG capsule Take 1 capsule (30 mg) by mouth 3 times daily 120 capsule 11     citalopram (CELEXA) 40 MG tablet TAKE ONE TABLET BY MOUTH ONCE DAILY 90 tablet 0     dronabinol (MARINOL) 2.5 MG capsule Take 1 capsule  (2.5 mg) by mouth 2 times daily (before meals) 28 capsule 3     HYDROcodone-acetaminophen (NORCO)  MG per tablet Take 1 tablet by mouth 4 times daily as needed for severe pain 120 tablet 0     hydrOXYzine (VISTARIL) 25 MG capsule TAKE ONE CAPSULE BY MOUTH FOUR TIMES A DAY AS NEEDED FOR ANXIETY 120 capsule 4     levothyroxine (SYNTHROID/LEVOTHROID) 112 MCG tablet Take 1 tablet (112 mcg) by mouth daily 30 tablet 11     levothyroxine (SYNTHROID/LEVOTHROID) 137 MCG tablet Take 1 tablet (137 mcg) by mouth daily 30 tablet 11     metoprolol succinate ER (TOPROL-XL) 50 MG 24 hr tablet Take 1 tablet (50 mg) by mouth daily 90 tablet 2     omeprazole (PRILOSEC) 40 MG DR capsule Take 1 capsule (40 mg) by mouth daily 90 capsule 3     pilocarpine (SALAGEN) 5 MG tablet Take 1 tablet (5 mg) by mouth 3 times daily 90 tablet 3     rosuvastatin (CRESTOR) 40 MG tablet Take 1 tablet (40 mg) by mouth daily 90 tablet 3     zolpidem ER (AMBIEN CR) 12.5 MG CR tablet TAKE ONE TABLET BY MOUTH NIGHTLY AS NEEDED FOR SLEEP 30 tablet 3     nitroGLYcerin (NITROSTAT) 0.4 MG sublingual tablet For chest pain place 1 tablet under the tongue every 5 minutes for 3 doses. If symptoms persist 5 minutes after 1st dose call 911. (Patient not taking: No sig reported) 25 tablet 0       ALLERGIES:    Allergies   Allergen Reactions     Animal Dander      Azithromycin Nausea and Vomiting     Dust Mites      Pollen Extract      Smoke.        HABITS/SOCIAL HISTORY:   Nonsmoker. Chewing tobacco use occasionally when fishing. . Drink 2-3 beers per day.   Lives with wife (nurse).   Retired .     Social History     Socioeconomic History     Marital status:      Spouse name: Alessandra     Number of children: 2     Years of education: Not on file     Highest education level: Not on file   Occupational History     Occupation: Retired   Tobacco Use     Smoking status: Never Smoker     Smokeless tobacco: Never Used   Vaping Use     Vaping  Use: Never used   Substance and Sexual Activity     Alcohol use: Yes     Types: 10 Cans of beer per week     Comment: Moderate     Drug use: No     Sexual activity: Yes     Partners: Female     Birth control/protection: Female Surgical   Other Topics Concern      Service Not Asked     Blood Transfusions Not Asked     Caffeine Concern Not Asked     Occupational Exposure Not Asked     Hobby Hazards Not Asked     Sleep Concern Not Asked     Stress Concern Not Asked     Weight Concern Not Asked     Special Diet Not Asked     Back Care Not Asked     Exercise Not Asked     Bike Helmet Not Asked     Seat Belt Not Asked     Self-Exams Not Asked     Parent/sibling w/ CABG, MI or angioplasty before 65F 55M? No   Social History Narrative     Not on file     Social Determinants of Health     Financial Resource Strain: Not on file   Food Insecurity: Not on file   Transportation Needs: Not on file   Physical Activity: Not on file   Stress: Not on file   Social Connections: Not on file   Intimate Partner Violence: Not on file   Housing Stability: Not on file       PAST MEDICAL HISTORY:   Past Medical History:   Diagnosis Date     Allergic rhinitis      Allergy, unspecified not elsewhere classified     Seasonal allergies, pollen, dust, smoke and animals     Antiplatelet or antithrombotic long-term use      Anxiety      Arthritis      Chest pain      Chronic sinusitis      Coronary atherosclerosis of unspecified type of vessel, native or graft     Coronary artery disease     Depressive disorder 1995     Gastroesophageal reflux disease 2020    Cleared with medication     Head injury 1954     Hiatal hernia 2015    Right Side     History of blood transfusion 12/15/2004    Prostate Surgery - My own blood     Hyperlipidemia      Hypertension      Inguinal hernia      Kidney problem 10/08/2017    Lithotripsy     Kidney stones      Malignant neoplasm of prostate ([facility])     Prostate cancer     Prostate cancer (H)      Syncope          PAST SURGICAL HISTORY:   Past Surgical History:   Procedure Laterality Date     ARTHRODESIS FOOT  7/23/2013    Procedure: ARTHRODESIS FOOT;  Great Toe Arthrodesis Left Foot;  Surgeon: Ash Gonzalez DPM;  Location: [facility] OR     ARTHRODESIS FOOT  6/10/2014    Procedure: ARTHRODESIS FOOT;  Surgeon: Ash Gonzalez DPM;  Location: [facility]     BIOPSY  10/1/2004    dermatologist biopsies     COLONOSCOPY  10/7/2013    Procedure: COLONOSCOPY;  Colonoscopy;  Surgeon: Mike Fallon MD;  Location: [facility]     CYSTOSCOPY N/A 2/16/2022    Procedure: CYSTOSCOPY and bladder stone removal;  Surgeon: David Rogers MD;  Location: [facility]     ESOPHAGOSCOPY, GASTROSCOPY, DUODENOSCOPY (EGD), COMBINED N/A 2/12/2020    Procedure: ESOPHAGOGASTRODUODENOSCOPY (EGD);  Surgeon: Sam Escobar MD;  Location: [facility]     EXTRACORPOREAL SHOCK WAVE LITHOTRIPSY (ESWL) Bilateral 10/18/2017    Procedure: EXTRACORPOREAL SHOCK WAVE LITHOTRIPSY (ESWL);  BILATERAL EXTRACORPOREAL SHOCKWAVE LITHOTRIPSY ;  Surgeon: Meir Torres MD;  Location: [facility] OR     HC CORRECT BUNION,SIMPLE  08/11/2005    x3     HC REMV TOE BENIGN BONE LESN  08/11/2005     HEAD & NECK SURGERY  1954    From a fall     HERNIORRHAPHY INGUINAL  7/3/2013    Procedure: HERNIORRHAPHY INGUINAL;  Open Repair Inguinal hernia Right with mesh ;  Surgeon: Sam Escobar MD;  Location: [facility]     IR GASTROSTOMY TUBE PERCUTANEOUS PLCMNT  6/7/2021     MOHS MICROGRAPHIC PROCEDURE  08/23/11    [facility] and [facility]     OPEN REDUCTION INTERNAL FIXATION WRIST Right 7/18/2017    Procedure: OPEN REDUCTION INTERNAL FIXATION WRIST;  Right distal radius open reduction and internal fixation;  Surgeon: Pedro Blanca DO;  Location: [facility]     RECONSTRUCT FOREFOOT WITH METATARSOPHALANGEAL (MTP) FUSION  6/10/2014    Procedure: RECONSTRUCT FOREFOOT WITH METATARSOPHALANGEAL (MTP) FUSION;  Surgeon: Ash Gonzalez DPM;  Location: [facility]     STENT, CORONARY, DEMI    "    SURGICAL HISTORY OF -   1999/1974    lt knee     SURGICAL HISTORY OF -   10/2004    lithotripsy     SURGICAL HISTORY OF -   11/05    angiogram with stent     VASCULAR SURGERY  11/17/2005    Puncture of iliac artery during and angiogram     [facility] LAPAROSCOPY, SURGICAL PROSTATECTOMY, RETROPUBIC RADICAL, W/NERVE SPARING  11/30/2004    With full bilateral pelvic lymphadenectomy.  [facility].     [facility] TOTAL KNEE ARTHROPLASTY  05/01/08    Left knee       FAMILY HISTORY:    Family History   Problem Relation Age of Onset     Hypertension Father         Lived to age 87     Connective Tissue Disorder Mother         LUPUS     Heart Disease Mother         Valve replacement     Anxiety Disorder Mother         Lived to age 84     Dementia Mother         Nursing Home (lived to age 86)       REVIEW OF SYSTEMS:  12 point ROS was negative other than the symptoms noted above in the HPI.  Patient Supplied Answers to Review of Systems  [facility] ENT ROS 5/7/2022   Constitutional Problems with sleep   Neurology -   Psychology -   Ears, Nose, Throat Nasal congestion or drainage   Allergy/Immunology Allergies or hay fever   Hematologic -         PHYSICAL EXAMINATION:   /78 (BP Location: Right arm, Patient Position: Sitting, Cuff Size: Adult Regular)   Pulse 73   Temp 97.3  F (36.3  C) (Temporal)   Ht 1.727 m (5' 8\")   Wt 88.5 kg (195 lb)   BMI 29.65 kg/m     Appearance:   normal; NAD, age-appropriate appearance, well-developed, normal habitus   Communication:   normal; communicates verbally, normal voice quality   Head/Face:   inspection -  Normal; no scars or visible lesions   Salivary glands -  Normal size, no tenderness, swelling, or palpable masses   Facial strength -  Normal and symmetric    Skin:  normal, no rash   Ears:  auricle (AD) -  normal  EAC (AD) -  normal  TM (AD) -  Normal, no effusion  auricle (AS) -  normal  EAC (AS) -  normal  TM (AS) -  Small serous effusion  Normal clinical speech reception   Nose:  Ext. inspection -  " Normal  Internal Inspection -  Normal mucosa, septum, and turbinates   Nasopharynx:  normal mucosa, no masses   Oral Cavity:  lips -  Normal mucosa, oral competence, and stoma size   Age-appropriate dentition, healthy gingival mucosa  Mildly dry mucus membranes   Hard palate, buccal, floor of mouth mucosa with xerostomia   Tongue - normal movement, no masses or mucosal lesions   Oropharynx:  There are radiation changes to the mucosa throughout with scattered telangiectasias  Retraction of the mucosa of the palate and tonsillar fossa, left greater than right  There are no secretions in the vallecula  Palpation is limited of the oropharynx due to gag reflex, no palpable masses, no mucosal lesions  Base of tongue - no masses or mucosal lesions  No residual disease   Hypopharynx:  Normal pyriform sinus    No pooled secretions    Larynx:  Epiglottis, false vocal cords, true vocal cords normal in appearance, bilaterally mobile cords   The left arytenoid and AE fold have mild radiation edema that is not obstructive    Neck: No palpable masses  Mild submental lymphedema  Mild fibrosis bilateral neck   Lymphatic:  No palpable masses   Cardiovascular:  warm, pink, well-perfused extremities without swelling, tenderness, or edema   Respiratory:  Normal respiratory effort, no stridor   Neuro/Psych.:  mood/affect -  normal  mental status -  normal         PROCEDURES:   Flexible fiberoptic laryngoscopy: Scope exam was indicated due to tonsil cancer. Verbal consent was obtained. The nasal cavity was prepped with an aerosolized solution of topical anesthetic and vasoconstrictive agent. The scope was passed through the anterior nasal cavity and advanced. Inspection of the nasopharynx revealed no gross abnormality.  There are no masses or mucosal lesions in the left tonsil/oropharynx.  The epiglottis has very minimal thickening.  There is radiation edema of the left AE fold and left arytenoid.  This is not obstructive.  The vocal  cords are mobile bilaterally.  The piriform sinuses are clear. The airway is patent. Procedure tolerated well with no immediate complications noted.      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 He completed definitive chemoradiation on 6/12/2021.    He is overall doing well. He     He was seen by SLP team today who discharged him today. He should continue doing the exercises.     He is doing his lymphedema therapy at home.    His TSH was normal on recheck in November 2021. He is on synthroid. He is due for recheck of labs. He went to the lab today, synthroid dose too high, new script sent to pharmacy. Will recheck labs in 3 months.    He had repeat imaging today. We will send results to [facility].    He will return to [facility] clinic in 3 months.    Thank you very much for the opportunity to participate in the care of your patient.      Ligia Fang MD  Otolaryngology- Head & Neck Surgery    This note was dictated with voice recognition software and then edited. Please excuse any unintentional errors.     CC:  Ahmet Robbins MD  Department of Radiation Oncology  [facility]    Too Christensen MD  [address] [place] [address]    Shanthi Schrader MD  Department of Medical Oncology  [facility]

## 2022-05-13 NOTE — PROGRESS NOTES
[facility]    OUTPATIENT SPEECH LANGUAGE PATHOLOGY  PLAN OF TREATMENT FOR OUTPATIENT REHABILITATION AND PROGRESS NOTE                                                          Patient's Last Name, First Name, Quan Goodrich Date of Birth  1950   Provider's Name  [facility] Medical Record No.  [id number]    Onset Date  04/27/2021 Start of Care Date  04/27/2021   Type:     __PT   ___OT   _X_SLP Medical Diagnosis  Squamous cell carcinoma of tonsil   SLP Diagnosis  Oropharyngeal dysphagia Plan of Treatment  Frequency/Duration: 1x/2-3 months in conjunction with ENT clinic visit  Certification date from 4/22/22 to 5/13/22       Goals:  Goal Identifier Diet   Goal Description 1. Pt will tolerate regular textures and thin liquids with no overt clinical s/sx of penetration/aspiration in 100% of PO trials.    Target Date 04/22/22   Date Met  05/13/22   Progress (detail required for progress note): Pt reports swallowing is going well. Denies coughing/TC and feeling of stasis with PO intake. He is able to swallow everything he wants. His taste is slowly improving. His wife made him a steak and some brown sugar carrots and he was able to taste these items. Trained pt on trajectory of taste recovery for the first 2 years after XRT and the importance of trying bites/sips of a variety of flavors. Pt also endorses a slight improvement in xerostomia, as well. Trained pt to continue with regular textures/thin liquids with use of safe swallow strategies. Trained pt to let MD know should he experience any new or worsening trouble swallowing.     Goal Identifier Exercises   Goal Description 2. Pt will complete 10 reps of 5/5 oropharyngeal and jaw strengthening/ROM exercises with minimal written and/or verbal cues.    Target Date 04/22/22   Date Met  05/13/22    Progress (detail required for progress note): Pt reports completing exercises daily. Retrained rationale and importance of continuing home exercise program for the first 2 years after XRT at a frequency of 2-3x/day. Pt agreeable.         Beginning/End Dates of Progress Note Reporting Period:  4/22/22 to 5/13/22    Progress Toward Goals:   Progress this reporting period: Pt seen today in conjunction with ENT clinic visit. Pt met goals and will be discharged from SLP services following today's visit.    Client Self (Subjective) Report for Progress Note Reporting Period: Quan Murphy is a 71-year-old male with a [id number] p16+ SCC of the left tonsil s/p definitive chemoXRT finishing on 6/12/21. He had PEG placed near the end of treatment, but has since had this removed. He returns today for dysphagia follow up and is seen in conjunction with ENT clinic visit. Reports swallowing is going well.              I CERTIFY THE NEED FOR THESE SERVICES FURNISHED UNDER        THIS PLAN OF TREATMENT AND WHILE UNDER MY CARE     (Physician attestation of this document indicates review and certification of the therapy plan).                Referring Provider: Dr. Ligia Duong, SLP

## 2022-05-14 RX ORDER — LEVOTHYROXINE SODIUM 112 UG/1
112 TABLET ORAL DAILY
Qty: 30 TABLET | Refills: 11 | Status: SHIPPED | OUTPATIENT
Start: 2022-05-14 | End: 2023-02-08

## 2022-05-20 ENCOUNTER — PATIENT OUTREACH (OUTPATIENT)
Dept: OTOLARYNGOLOGY | Facility: CLINIC | Age: 72
End: 2022-05-20
Payer: MEDICARE

## 2022-05-21 NOTE — PROGRESS NOTES
Called and left message for patient to review CT scan results. Left direct line for return call to discuss.     Chela Mariano, RN, BSN

## 2022-05-24 ENCOUNTER — PATIENT OUTREACH (OUTPATIENT)
Dept: OTOLARYNGOLOGY | Facility: CLINIC | Age: 72
End: 2022-05-24
Payer: MEDICARE

## 2022-05-24 NOTE — PROGRESS NOTES
Called and left message for patient to discuss CT neck results. Left direct line for return call.     Chela Mariano, RN, BSN

## 2022-05-25 ENCOUNTER — PATIENT OUTREACH (OUTPATIENT)
Dept: OTOLARYNGOLOGY | Facility: CLINIC | Age: 72
End: 2022-05-25
Payer: MEDICARE

## 2022-05-25 NOTE — PROGRESS NOTES
Called and left another message for patient to review CT scan results. Left direct line for return call to discuss.     Chela Mariano, RN, BSN

## 2022-05-26 ENCOUNTER — PATIENT OUTREACH (OUTPATIENT)
Dept: OTOLARYNGOLOGY | Facility: CLINIC | Age: 72
End: 2022-05-26
Payer: MEDICARE

## 2022-05-26 DIAGNOSIS — C09.9 SQUAMOUS CELL CARCINOMA OF TONSIL (H): Primary | ICD-10-CM

## 2022-05-26 NOTE — PROGRESS NOTES
Called placed to patient again to review CT scan results, still no answer. Left message with the following  CT results:    Impression:  Postradiation changes in the neck. No evidence of local  residual/recurrent disease. Indeterminant left level 2A lymph nodes,  slightly smaller in size compared with PET/CT from 9/3/2021 but  appears more solid in appearance. Recommend short-term follow-up.     Primary: 1} Expected post-treatment changes in the neck without  evidence of recurrent disease at the primary site.  Neck: 2}  short interval follow-up or PET    Reviewed that Dr. Fang would like to complete repeast CT scan in 3 months to evaluate the left level 2A nodes seen. Reviewed that its just out of abdundnace of caution, and Dr. Fang is not overly concerned she would like to just continue to monitor this. Advised patient writer added CT scan to already scheduled appointment on 8/19. Encouraged patient to return call with any questions or concerns.       Chela Mariano, RN, BSN

## 2022-06-01 ENCOUNTER — MYC REFILL (OUTPATIENT)
Dept: FAMILY MEDICINE | Facility: CLINIC | Age: 72
End: 2022-06-01
Payer: MEDICARE

## 2022-06-01 DIAGNOSIS — G89.3 NEOPLASM RELATED PAIN: ICD-10-CM

## 2022-06-01 RX ORDER — HYDROCODONE BITARTRATE AND ACETAMINOPHEN 10; 325 MG/1; MG/1
1 TABLET ORAL 4 TIMES DAILY PRN
Qty: 120 TABLET | Refills: 0 | Status: SHIPPED | OUTPATIENT
Start: 2022-06-01 | End: 2022-06-28

## 2022-06-01 ASSESSMENT — PATIENT HEALTH QUESTIONNAIRE - PHQ9
SUM OF ALL RESPONSES TO PHQ QUESTIONS 1-9: 8
SUM OF ALL RESPONSES TO PHQ QUESTIONS 1-9: 8
10. IF YOU CHECKED OFF ANY PROBLEMS, HOW DIFFICULT HAVE THESE PROBLEMS MADE IT FOR YOU TO DO YOUR WORK, TAKE CARE OF THINGS AT HOME, OR GET ALONG WITH OTHER PEOPLE: SOMEWHAT DIFFICULT

## 2022-06-01 ASSESSMENT — ANXIETY QUESTIONNAIRES
GAD7 TOTAL SCORE: 10
4. TROUBLE RELAXING: MORE THAN HALF THE DAYS
3. WORRYING TOO MUCH ABOUT DIFFERENT THINGS: MORE THAN HALF THE DAYS
5. BEING SO RESTLESS THAT IT IS HARD TO SIT STILL: NOT AT ALL
1. FEELING NERVOUS, ANXIOUS, OR ON EDGE: NEARLY EVERY DAY
6. BECOMING EASILY ANNOYED OR IRRITABLE: NOT AT ALL
GAD7 TOTAL SCORE: 10
GAD7 TOTAL SCORE: 10
7. FEELING AFRAID AS IF SOMETHING AWFUL MIGHT HAPPEN: SEVERAL DAYS
2. NOT BEING ABLE TO STOP OR CONTROL WORRYING: MORE THAN HALF THE DAYS
7. FEELING AFRAID AS IF SOMETHING AWFUL MIGHT HAPPEN: SEVERAL DAYS

## 2022-06-01 NOTE — TELEPHONE ENCOUNTER
HYDROcodone-acetaminophen (NORCO)  MG per tablet         Last Written Prescription Date:  5/4/22  Last Fill Quantity: 120,   # refills: 0  Last Office Visit: 4/21/22  Future Office visit:    Next 5 appointments (look out 90 days)    Aug 03, 2022  1:00 PM  Return Visit with David Rogers MD  [facility] ([facility] ) [address] [place] [id number] [phone]           Routing refill request to provider for review/approval because:  Drug not on the [facility], [facility] or [facility] refill protocol or controlled substance

## 2022-06-02 ENCOUNTER — OFFICE VISIT (OUTPATIENT)
Dept: FAMILY MEDICINE | Facility: CLINIC | Age: 72
End: 2022-06-02
Payer: MEDICARE

## 2022-06-02 VITALS
RESPIRATION RATE: 16 BRPM | BODY MASS INDEX: 28.66 KG/M2 | HEART RATE: 78 BPM | HEIGHT: 69 IN | SYSTOLIC BLOOD PRESSURE: 114 MMHG | OXYGEN SATURATION: 100 % | TEMPERATURE: 96.9 F | DIASTOLIC BLOOD PRESSURE: 64 MMHG | WEIGHT: 193.5 LBS

## 2022-06-02 DIAGNOSIS — Z23 NEED FOR VACCINATION: ICD-10-CM

## 2022-06-02 DIAGNOSIS — F41.9 ANXIETY: ICD-10-CM

## 2022-06-02 DIAGNOSIS — Z79.899 ENCOUNTER FOR LONG-TERM (CURRENT) USE OF MEDICATIONS: Primary | ICD-10-CM

## 2022-06-02 PROCEDURE — 99214 OFFICE O/P EST MOD 30 MIN: CPT | Mod: 25 | Performed by: FAMILY MEDICINE

## 2022-06-02 PROCEDURE — 96127 BRIEF EMOTIONAL/BEHAV ASSMT: CPT | Performed by: FAMILY MEDICINE

## 2022-06-02 PROCEDURE — 0064A COVID-19,PF,MODERNA (18+ YRS BOOSTER .25ML): CPT | Performed by: FAMILY MEDICINE

## 2022-06-02 PROCEDURE — 91306 COVID-19,PF,MODERNA (18+ YRS BOOSTER .25ML): CPT | Performed by: FAMILY MEDICINE

## 2022-06-02 RX ORDER — BUPROPION HYDROCHLORIDE 150 MG/1
150 TABLET ORAL EVERY MORNING
Qty: 30 TABLET | Refills: 1 | Status: SHIPPED | OUTPATIENT
Start: 2022-06-02 | End: 2022-09-27

## 2022-06-02 RX ORDER — ALPRAZOLAM 0.5 MG
0.5 TABLET ORAL 3 TIMES DAILY PRN
Qty: 90 TABLET | Refills: 0 | Status: SHIPPED | OUTPATIENT
Start: 2022-06-02 | End: 2022-06-28

## 2022-06-02 ASSESSMENT — ANXIETY QUESTIONNAIRES: GAD7 TOTAL SCORE: 10

## 2022-06-02 ASSESSMENT — PATIENT HEALTH QUESTIONNAIRE - PHQ9
10. IF YOU CHECKED OFF ANY PROBLEMS, HOW DIFFICULT HAVE THESE PROBLEMS MADE IT FOR YOU TO DO YOUR WORK, TAKE CARE OF THINGS AT HOME, OR GET ALONG WITH OTHER PEOPLE: SOMEWHAT DIFFICULT
SUM OF ALL RESPONSES TO PHQ QUESTIONS 1-9: 8

## 2022-06-02 ASSESSMENT — PAIN SCALES - GENERAL: PAINLEVEL: NO PAIN (0)

## 2022-06-02 NOTE — PROGRESS NOTES
"  Assessment & Plan     Encounter for long-term (current) use of medications  Multiple medical problems.  Including prostate cancer, tonsillar cancer, coronary artery disease and chronic sinusitis.  We have discussed his use of opioids and benzodiazepines many times.  He says he wants to start tapering down on his opioids.  He pleads with me to do this he is getting more anxious and wants to know if he can up his Xanax for a while to 3 times a day if needed see discussion below.  Then he will start working on decreasing his Vicodin use first working to drop it 3 times a day for the most part from 4 times a day.  I have told him we are going to probably need to get him back into pain clinic.  He wants to try to do this on his own.    Anxiety  Extreme anxiety.  He is on citalopram, Wellbutrin, hydroxyzine, Xanax and Ambien at night.  He does not feel counseling has done anything for him ever and is not interested in this.  Recommend getting him into see psychiatric medication consult.  - buPROPion (WELLBUTRIN XL) 150 MG 24 hr tablet; Take 1 tablet (150 mg) by mouth every morning  - ALPRAZolam (XANAX) 0.5 MG tablet; Take 1 tablet (0.5 mg) by mouth 3 times daily as needed for anxiety    Need for vaccination    - COVID-19,PF,MODERNA (18+ YRS BOOSTER .25ML)             BMI:   Estimated body mass index is 28.99 kg/m  as calculated from the following:    Height as of this encounter: 1.74 m (5' 8.5\").    Weight as of this encounter: 87.8 kg (193 lb 8 oz).           No follow-ups on file.    Jose Hernandez MD  [facility]    Claribel Wolff is a 71 year old who presents for the following health issues: Discussion of the following issues.  He is very concerned about his medications and when he transitions to a different provider after my long-term.  We had a discussion before that getting him back involved with a chronic pain clinic and psychiatry is probably going to be the direction.    History " of Present Illness       Mental Health Follow-up:  Patient presents to follow-up on Depression & Anxiety.Patient's depression since last visit has been:  Worse  The patient is not having other symptoms associated with depression.  Patient's anxiety since last visit has been:  Worse  The patient is having other symptoms associated with anxiety.  Any significant life events: health concerns  Patient is feeling anxious or having panic attacks.  Patient has no concerns about alcohol or drug use.    Reason for visit:  Discuss current symptoms and prescribed meds    He eats 0-1 servings of fruits and vegetables daily.He consumes 2 sweetened beverage(s) daily.He exercises with enough effort to increase his heart rate 10 to 19 minutes per day.  He exercises with enough effort to increase his heart rate 3 or less days per week.   He is taking medications regularly.    Today's PHQ-9         PHQ-9 Total Score: 8    PHQ-9 Q9 Thoughts of better off dead/self-harm past 2 weeks :   Not at all    How difficult have these problems made it for you to do your work, take care of things at home, or get along with other people: Somewhat difficult  Today's AME-7 Score: 10             Review of Systems   Constitutional, HEENT, cardiovascular, pulmonary, gi and gu systems are negative, except as otherwise noted.      Objective    There were no vitals taken for this visit.  There is no height or weight on file to calculate BMI.  Physical Exam   GENERAL: healthy, alert and no distress  MS: no gross musculoskeletal defects noted, no edema  SKIN: no suspicious lesions or rashes  NEURO: Normal strength and tone, mentation intact and speech normal  PSYCH: anxious, fatigued and appearance well groomed

## 2022-06-28 ENCOUNTER — MYC REFILL (OUTPATIENT)
Dept: FAMILY MEDICINE | Facility: CLINIC | Age: 72
End: 2022-06-28

## 2022-06-28 DIAGNOSIS — F41.9 ANXIETY: ICD-10-CM

## 2022-06-28 DIAGNOSIS — G89.3 NEOPLASM RELATED PAIN: ICD-10-CM

## 2022-06-28 ASSESSMENT — ANXIETY QUESTIONNAIRES
2. NOT BEING ABLE TO STOP OR CONTROL WORRYING: SEVERAL DAYS
8. IF YOU CHECKED OFF ANY PROBLEMS, HOW DIFFICULT HAVE THESE MADE IT FOR YOU TO DO YOUR WORK, TAKE CARE OF THINGS AT HOME, OR GET ALONG WITH OTHER PEOPLE?: NOT DIFFICULT AT ALL
5. BEING SO RESTLESS THAT IT IS HARD TO SIT STILL: NOT AT ALL
7. FEELING AFRAID AS IF SOMETHING AWFUL MIGHT HAPPEN: NOT AT ALL
6. BECOMING EASILY ANNOYED OR IRRITABLE: SEVERAL DAYS
7. FEELING AFRAID AS IF SOMETHING AWFUL MIGHT HAPPEN: NOT AT ALL
GAD7 TOTAL SCORE: 5
GAD7 TOTAL SCORE: 5
1. FEELING NERVOUS, ANXIOUS, OR ON EDGE: SEVERAL DAYS
4. TROUBLE RELAXING: SEVERAL DAYS
GAD7 TOTAL SCORE: 5
3. WORRYING TOO MUCH ABOUT DIFFERENT THINGS: SEVERAL DAYS

## 2022-06-28 ASSESSMENT — PATIENT HEALTH QUESTIONNAIRE - PHQ9
10. IF YOU CHECKED OFF ANY PROBLEMS, HOW DIFFICULT HAVE THESE PROBLEMS MADE IT FOR YOU TO DO YOUR WORK, TAKE CARE OF THINGS AT HOME, OR GET ALONG WITH OTHER PEOPLE: NOT DIFFICULT AT ALL
SUM OF ALL RESPONSES TO PHQ QUESTIONS 1-9: 1
SUM OF ALL RESPONSES TO PHQ QUESTIONS 1-9: 1

## 2022-06-29 RX ORDER — HYDROCODONE BITARTRATE AND ACETAMINOPHEN 10; 325 MG/1; MG/1
1 TABLET ORAL 4 TIMES DAILY PRN
Qty: 120 TABLET | Refills: 0 | Status: SHIPPED | OUTPATIENT
Start: 2022-06-29 | End: 2022-07-26

## 2022-06-29 RX ORDER — ALPRAZOLAM 0.5 MG
0.5 TABLET ORAL 3 TIMES DAILY PRN
Qty: 90 TABLET | Refills: 0 | Status: SHIPPED | OUTPATIENT
Start: 2022-06-29 | End: 2022-07-26

## 2022-06-29 NOTE — TELEPHONE ENCOUNTER
Requested Prescriptions   Pending Prescriptions Disp Refills     HYDROcodone-acetaminophen (NORCO)  MG per tablet 120 tablet 0     Sig: Take 1 tablet by mouth 4 times daily as needed for severe pain       There is no refill protocol information for this order           Last Written Prescription Date:  06/01/2022  Last Fill Quantity: 120,   # refills: 0  Last Office Visit: 06/02/2022  Future Office visit:    Next 5 appointments (look out 90 days)    Aug 03, 2022  1:00 PM  Return Visit with David Rogers MD  [facility] ([facility] ) [address] [place] [address] [phone]           Routing refill request to provider for review/approval because:  Drug not on the [facility] or  [employer] refill protocol or controlled substance    Requested Prescriptions   Pending Prescriptions Disp Refills        ALPRAZolam (XANAX) 0.5 MG tablet 90 tablet 0     Sig: Take 1 tablet (0.5 mg) by mouth 3 times daily as needed for anxiety       There is no refill protocol information for this order            Last Written Prescription Date:  06/02/2022  Last Fill Quantity: 90,   # refills: 0  Last Office Visit: 06/02/2022  Future Office visit:    Next 5 appointments (look out 90 days)    Aug 03, 2022  1:00 PM  Return Visit with David Rogers MD  [facility] ([facility] ) [address] [place] [address] [phone]           Routing refill request to provider for review/approval because:  Drug not on the [facility] or  [employer] refill protocol or controlled substance

## 2022-06-30 ENCOUNTER — OFFICE VISIT (OUTPATIENT)
Dept: FAMILY MEDICINE | Facility: CLINIC | Age: 72
End: 2022-06-30
Payer: MEDICARE

## 2022-06-30 VITALS
RESPIRATION RATE: 16 BRPM | DIASTOLIC BLOOD PRESSURE: 84 MMHG | WEIGHT: 194.5 LBS | HEART RATE: 73 BPM | BODY MASS INDEX: 28.81 KG/M2 | TEMPERATURE: 97.2 F | SYSTOLIC BLOOD PRESSURE: 124 MMHG | HEIGHT: 69 IN | OXYGEN SATURATION: 96 %

## 2022-06-30 DIAGNOSIS — Z79.899 ENCOUNTER FOR LONG-TERM (CURRENT) USE OF MEDICATIONS: ICD-10-CM

## 2022-06-30 DIAGNOSIS — F41.9 ANXIETY: Primary | ICD-10-CM

## 2022-06-30 PROCEDURE — 99214 OFFICE O/P EST MOD 30 MIN: CPT | Performed by: FAMILY MEDICINE

## 2022-06-30 RX ORDER — CITALOPRAM HYDROBROMIDE 40 MG/1
40 TABLET ORAL DAILY
Qty: 90 TABLET | Refills: 1 | Status: SHIPPED | OUTPATIENT
Start: 2022-06-30 | End: 2022-12-20

## 2022-06-30 ASSESSMENT — PAIN SCALES - GENERAL: PAINLEVEL: NO PAIN (0)

## 2022-06-30 ASSESSMENT — ANXIETY QUESTIONNAIRES: GAD7 TOTAL SCORE: 5

## 2022-06-30 ASSESSMENT — PATIENT HEALTH QUESTIONNAIRE - PHQ9
SUM OF ALL RESPONSES TO PHQ QUESTIONS 1-9: 1
10. IF YOU CHECKED OFF ANY PROBLEMS, HOW DIFFICULT HAVE THESE PROBLEMS MADE IT FOR YOU TO DO YOUR WORK, TAKE CARE OF THINGS AT HOME, OR GET ALONG WITH OTHER PEOPLE: NOT DIFFICULT AT ALL

## 2022-06-30 NOTE — PROGRESS NOTES
"  Assessment & Plan     Anxiety  Long discussion on this topic.  He suffers from extreme anxiety.  Number of medical comorbidities have led to this.  But he said this for years before having some more recent diagnoses.  The changes we made he is getting discontinue the hydroxyzine does not seem to be helping.  Last visit a month ago we allowed him to increase his Xanax 0.5 mg to 3 a day.  This seems to be really helping him.  He is been started on Wellbutrin 150 mg XL and this seems to be helping.  He tried to go off the Celexa but were putting him back on it as this seems to be helping him.  - citalopram (CELEXA) 40 MG tablet; Take 1 tablet (40 mg) by mouth daily    Encounter for long-term (current) use of medications  Discussed goals of getting him down on his Norco.  He is down to 4 a day.  Once the allergy season subsides because this causes him a lot of facial pressure were going to try to go to 3 a day and then down to 2 a day.  With his anxiety and better control he feels he can manage this.  I told him he may need to be sign up for the pain clinic he does not want to do this wants to try to manage on his own.               BMI:   Estimated body mass index is 29.14 kg/m  as calculated from the following:    Height as of this encounter: 1.74 m (5' 8.5\").    Weight as of this encounter: 88.2 kg (194 lb 8 oz).           No follow-ups on file.    Jose Hernandez MD  [facility] is a 71 year old, presenting for the following health issues:  Recheck Medication (Anxiety )  Here to review with some changes in his medications regarding anxiety.  And to talk about his long-term use of opioids.  Some a month ago we made some changes.  He feels he is doing better.    History of Present Illness       Mental Health Follow-up:  Patient presents to follow-up on Depression & Anxiety.Patient's depression since last visit has been:  Better  The patient is not having other symptoms " associated with depression.  Patient's anxiety since last visit has been:  Better  The patient is not having other symptoms associated with anxiety.  Any significant life events: No  Patient is not feeling anxious or having panic attacks.  Patient has no concerns about alcohol or drug use.He consumes 1 sweetened beverage(s) daily. He exercises with enough effort to increase his heart rate 3 or less days per week.   He is taking medications regularly.    Today's PHQ-9         PHQ-9 Total Score: 1    PHQ-9 Q9 Thoughts of better off dead/self-harm past 2 weeks :   Not at all    How difficult have these problems made it for you to do your work, take care of things at home, or get along with other people: Not difficult at all  Today's AME-7 Score: 5             Review of Systems   Constitutional, HEENT, cardiovascular, pulmonary, gi and gu systems are negative, except as otherwise noted.      Objective    There were no vitals taken for this visit.  There is no height or weight on file to calculate BMI.  Physical Exam   GENERAL: healthy, alert and no distress  PSYCH: mentation appears normal, anxious and appearance well groomed                    .  ..

## 2022-07-01 ENCOUNTER — TELEPHONE (OUTPATIENT)
Dept: UROLOGY | Facility: CLINIC | Age: 72
End: 2022-07-01

## 2022-07-01 NOTE — TELEPHONE ENCOUNTER
Reason for Call:  Other appointment    Detailed comments: patient has an appointment coming up with Dr. Rogers and he's wondering if he should be getting an ultrasound to check for kidney stones before he comes in? He doesn't feel like he has any at this point but just wants to double check that if he should have it done beforehand    Phone Number Patient can be reached at: Home number on file [phone] (home)    Best Time: any    Can we leave a detailed message on this number? YES    Call taken on 7/1/2022 at 2:22 PM by Christa Khan

## 2022-07-06 NOTE — TELEPHONE ENCOUNTER
"Message from Haha Pinchehart:  Original authorizing provider: MD Quan Dean would like a refill of the following medications:  neomycin-polymyxin-hydrocortisone (CORTISPORIN) 3.5-77868-6 otic suspension [Jose Hernandez MD]  ALPRAZolam (XANAX) 0.5 MG tablet [Jose Hernandez MD]  HYDROcodone-acetaminophen (NORCO) 7.5-325 MG per tablet [Jose Hernandez MD]    Preferred pharmacy: [address]     Comment:  Rm, Left ear starting seeping again same as before. Not as bad this time, but would like to treat it with ear drops again if okay with you. Norco is still on schedule. Just trying to \"bundle\" requests.  " Service [facility]

## 2022-07-20 ENCOUNTER — TELEPHONE (OUTPATIENT)
Dept: FAMILY MEDICINE | Facility: CLINIC | Age: 72
End: 2022-07-20

## 2022-07-20 NOTE — TELEPHONE ENCOUNTER
Reason for Call:  Medication or medication refill:    Do you use a [facility]?  Name of the pharmacy and phone number for the current request:  [facility] - [phone]    Name of the medication requested: zolpidem ER (AMBIEN CR) 12.5 MG CR tablet    Other request: patient states he took his last pill last night.     Start date on this Rx is 7/25/22    Can we leave a detailed message on this number? YES    Phone number patient can be reached at: Cell number on file:    Telephone Information:   Mobile [phone]       Best Time: any      Call taken on 7/20/2022 at 2:28 PM by eLana Maki

## 2022-07-20 NOTE — TELEPHONE ENCOUNTER
[facility]    4/01/22 - #30  4/28/22 - #30  5/24/22 - #30  6/20/22 - #30

## 2022-07-20 NOTE — TELEPHONE ENCOUNTER
I called [facility] pharmacy and gave them the verbal OK to fill today and added instructions to patient that these must last 30 days.

## 2022-07-26 ENCOUNTER — MYC MEDICAL ADVICE (OUTPATIENT)
Dept: FAMILY MEDICINE | Facility: CLINIC | Age: 72
End: 2022-07-26

## 2022-07-26 ENCOUNTER — MYC REFILL (OUTPATIENT)
Dept: FAMILY MEDICINE | Facility: CLINIC | Age: 72
End: 2022-07-26

## 2022-07-26 DIAGNOSIS — F41.9 ANXIETY: ICD-10-CM

## 2022-07-26 DIAGNOSIS — G89.3 NEOPLASM RELATED PAIN: ICD-10-CM

## 2022-07-26 RX ORDER — ALPRAZOLAM 0.5 MG
0.5 TABLET ORAL 3 TIMES DAILY PRN
Qty: 90 TABLET | Refills: 0 | Status: SHIPPED | OUTPATIENT
Start: 2022-07-26 | End: 2022-08-24

## 2022-07-26 RX ORDER — HYDROCODONE BITARTRATE AND ACETAMINOPHEN 10; 325 MG/1; MG/1
1 TABLET ORAL 4 TIMES DAILY PRN
Qty: 120 TABLET | Refills: 0 | Status: SHIPPED | OUTPATIENT
Start: 2022-07-26 | End: 2022-08-31

## 2022-07-26 NOTE — TELEPHONE ENCOUNTER
Requested Prescriptions   Pending Prescriptions Disp Refills     HYDROcodone-acetaminophen (NORCO)  MG per tablet 120 tablet 0     Sig: Take 1 tablet by mouth 4 times daily as needed for severe pain     Last Written Prescription Date:  06/29/2022  Last Fill Quantity: 120,   # refills: 0  Last Office Visit: 06/30/2022  Future Office visit:    Next 5 appointments (look out 90 days)    Aug 17, 2022 12:30 PM  Return Visit with David Rogers MD  [facility] ([address] [phone]           Routing refill request to provider for review/approval because:  Drug not on the [facility] or [facility] refill protocol or controlled substance              ALPRAZolam (XANAX) 0.5 MG tablet 90 tablet 0     Sig: Take 1 tablet (0.5 mg) by mouth 3 times daily as needed for anxiety     Last Written Prescription Date:  06/29/2022  Last Fill Quantity: 90,   # refills: 0  Last Office Visit: 06/30/2022  Future Office visit:    Next 5 appointments (look out 90 days)    Aug 17, 2022 12:30 PM  Return Visit with David Rogers MD  [facility] ([facility] ) [address] [place] [address] [phone]           Routing refill request to provider for review/approval because:  Drug not on the [facility], [facility] or  Health refill protocol or controlled substance

## 2022-07-27 ENCOUNTER — OFFICE VISIT (OUTPATIENT)
Dept: PODIATRY | Facility: CLINIC | Age: 72
End: 2022-07-27
Payer: MEDICARE

## 2022-07-27 VITALS
WEIGHT: 196 LBS | SYSTOLIC BLOOD PRESSURE: 142 MMHG | HEIGHT: 69 IN | DIASTOLIC BLOOD PRESSURE: 82 MMHG | BODY MASS INDEX: 29.03 KG/M2

## 2022-07-27 DIAGNOSIS — L60.0 INGROWN TOENAIL OF BOTH FEET: Primary | ICD-10-CM

## 2022-07-27 PROCEDURE — 99203 OFFICE O/P NEW LOW 30 MIN: CPT | Performed by: PODIATRIST

## 2022-07-27 ASSESSMENT — PAIN SCALES - GENERAL: PAINLEVEL: NO PAIN (0)

## 2022-07-27 NOTE — LETTER
"    7/27/2022         RE: Quan Murphy  [address]  [facility] [address]        Dear Colleague,    Thank you for referring your patient, Quan Murphy, to the [facility]. Please see a copy of my visit note below.    Chief Complaint   Patient presents with     Consult     Ingrown lateral left > right great toenail; LOV 9/12/2014 for this issue     PATIENT HISTORY:  Quan Murphy is a 64 year old male who presents to clinic complaining of Ingrown lateral borders of both hallux nail no bleeding but they are painful inflamed red.  History of bilateral first MPJ arthrodesis.    EXAM:Vitals: BP (!) 142/82 (BP Location: Left arm, Patient Position: Sitting, Cuff Size: Adult Regular)   Ht 1.74 m (5' 8.5\")   Wt 88.9 kg (196 lb)   BMI 29.37 kg/m    BMI= Body mass index is 29.37 kg/m .    General appearance: Patient is alert and fully cooperative with history & exam.  No sign of distress is noted during the visit.     Psychiatric: Affect is pleasant & appropriate.  Patient appears motivated to improve health.     Respiratory: Breathing is regular & unlabored while sitting.     HEENT: Hearing is intact to spoken word.  Speech is clear.  No gross evidence of visual impairment that would impact ambulation.     Vascular: DP & PT pulses are intact & regular bilaterally.  No significant edema or varicosities noted.  CFT and skin temperature is normal to both lower extremities.     Neurologic: Lower extremity sensation is intact to light touch.  No evidence of weakness or contracture in the lower extremities.  No evidence of neuropathy.    Dermatologic: Skin is intact to both lower extremities with adequate texture turgor tone about the integument.  Lateral borders of both hallux nails are mildly incurvated with localized erythema and subtle edema but no drainage or bleeding.    Musculoskeletal: Patient is ambulatory without assistive device or brace.  No gross ankle deformity noted.  No foot or " ankle joint effusion is noted.  Consultations noted. First MPJ arthrodesis bilateral with mild edema about the right foot first MPJ.     ASSESSMENT:       ICD-10-CM    1. Ingrown toenail of both feet  L60.0         PLAN:  Reviewed patient's chart in epic.    7/27/2022  Minimal debridement occurred about both lateral hallux nails.  Recommended permanent matrixectomy if this becomes chronic issues.  May return now to regular bathing regular hygiene and follow-up for matrixectomy if these remain symptomatic otherwise as needed.  All questions were answered.     Ash Gonzalez DPM        Again, thank you for allowing me to participate in the care of your patient.        Sincerely,        Ash Gonzalez DPM

## 2022-07-27 NOTE — PROGRESS NOTES
"Chief Complaint   Patient presents with     Consult     Ingrown lateral left > right great toenail; LOV 9/12/2014 for this issue     PATIENT HISTORY:  Quan Murphy is a 64 year old male who presents to clinic complaining of Ingrown lateral borders of both hallux nail no bleeding but they are painful inflamed red.  History of bilateral first MPJ arthrodesis.    EXAM:Vitals: BP (!) 142/82 (BP Location: Left arm, Patient Position: Sitting, Cuff Size: Adult Regular)   Ht 1.74 m (5' 8.5\")   Wt 88.9 kg (196 lb)   BMI 29.37 kg/m    BMI= Body mass index is 29.37 kg/m .    General appearance: Patient is alert and fully cooperative with history & exam.  No sign of distress is noted during the visit.     Psychiatric: Affect is pleasant & appropriate.  Patient appears motivated to improve health.     Respiratory: Breathing is regular & unlabored while sitting.     HEENT: Hearing is intact to spoken word.  Speech is clear.  No gross evidence of visual impairment that would impact ambulation.     Vascular: DP & PT pulses are intact & regular bilaterally.  No significant edema or varicosities noted.  CFT and skin temperature is normal to both lower extremities.     Neurologic: Lower extremity sensation is intact to light touch.  No evidence of weakness or contracture in the lower extremities.  No evidence of neuropathy.    Dermatologic: Skin is intact to both lower extremities with adequate texture turgor tone about the integument.  Lateral borders of both hallux nails are mildly incurvated with localized erythema and subtle edema but no drainage or bleeding.    Musculoskeletal: Patient is ambulatory without assistive device or brace.  No gross ankle deformity noted.  No foot or ankle joint effusion is noted.  Consultations noted. First MPJ arthrodesis bilateral with mild edema about the right foot first MPJ.     ASSESSMENT:       ICD-10-CM    1. Ingrown toenail of both feet  L60.0         PLAN:  Reviewed patient's chart in " epic.    7/27/2022  Minimal debridement occurred about both lateral hallux nails.  Recommended permanent matrixectomy if this becomes chronic issues.  May return now to regular bathing regular hygiene and follow-up for matrixectomy if these remain symptomatic otherwise as needed.  All questions were answered.     Ash Gonzalez DPM

## 2022-08-16 ENCOUNTER — LAB (OUTPATIENT)
Dept: LAB | Facility: CLINIC | Age: 72
End: 2022-08-16
Payer: MEDICARE

## 2022-08-16 ENCOUNTER — TELEPHONE (OUTPATIENT)
Dept: UROLOGY | Facility: CLINIC | Age: 72
End: 2022-08-16

## 2022-08-16 DIAGNOSIS — E03.4 HYPOTHYROIDISM DUE TO ACQUIRED ATROPHY OF THYROID: ICD-10-CM

## 2022-08-16 DIAGNOSIS — N18.30 CHRONIC KIDNEY DISEASE, STAGE 3 (H): Primary | ICD-10-CM

## 2022-08-16 DIAGNOSIS — C61 MALIGNANT NEOPLASM OF PROSTATE (H): ICD-10-CM

## 2022-08-16 PROBLEM — E43 SEVERE PROTEIN-CALORIE MALNUTRITION (H): Status: ACTIVE | Noted: 2022-08-16

## 2022-08-16 PROBLEM — D61.818 PANCYTOPENIA (H): Status: ACTIVE | Noted: 2022-08-16

## 2022-08-16 LAB
CREAT UR-MCNC: 63 MG/DL
MICROALBUMIN UR-MCNC: 6 MG/L
MICROALBUMIN/CREAT UR: 9.52 MG/G CR (ref 0–17)
PSA SERPL-MCNC: 0.17 UG/L (ref 0–4)
T4 FREE SERPL-MCNC: 1.19 NG/DL (ref 0.76–1.46)
TSH SERPL DL<=0.005 MIU/L-ACNC: 0.06 MU/L (ref 0.4–4)

## 2022-08-16 PROCEDURE — 84153 ASSAY OF PSA TOTAL: CPT

## 2022-08-16 PROCEDURE — 36415 COLL VENOUS BLD VENIPUNCTURE: CPT

## 2022-08-16 PROCEDURE — 84443 ASSAY THYROID STIM HORMONE: CPT

## 2022-08-16 PROCEDURE — 82043 UR ALBUMIN QUANTITATIVE: CPT

## 2022-08-16 PROCEDURE — 84439 ASSAY OF FREE THYROXINE: CPT

## 2022-08-16 NOTE — TELEPHONE ENCOUNTER
Patient contacted. He declines virtual visit as he is not comfortable with this type of visit. Patient will keep in person appointment on 8/17.  Mckayla Kamara CMA

## 2022-08-16 NOTE — TELEPHONE ENCOUNTER
[facility] Call Center    Phone Message    May a detailed message be left on voicemail: yes     Reason for Call: Patient is calling to see why he received a message from the clinic, writer saw appt note about converting appt to virtual.  Patient is not available today as he has a lab appt scheduled for today and will be out.  Please reach out to patient to discuss.    Action Taken: Other: Urology    Travel Screening: Not Applicable

## 2022-08-17 ENCOUNTER — OFFICE VISIT (OUTPATIENT)
Dept: UROLOGY | Facility: CLINIC | Age: 72
End: 2022-08-17
Payer: MEDICARE

## 2022-08-17 ENCOUNTER — ANCILLARY PROCEDURE (OUTPATIENT)
Dept: GENERAL RADIOLOGY | Facility: CLINIC | Age: 72
End: 2022-08-17
Attending: UROLOGY
Payer: MEDICARE

## 2022-08-17 VITALS
RESPIRATION RATE: 16 BRPM | OXYGEN SATURATION: 100 % | DIASTOLIC BLOOD PRESSURE: 80 MMHG | SYSTOLIC BLOOD PRESSURE: 125 MMHG | WEIGHT: 193.2 LBS | BODY MASS INDEX: 28.95 KG/M2 | HEART RATE: 67 BPM

## 2022-08-17 DIAGNOSIS — C61 MALIGNANT NEOPLASM OF PROSTATE (H): ICD-10-CM

## 2022-08-17 DIAGNOSIS — N21.0 BLADDER STONES: Primary | ICD-10-CM

## 2022-08-17 DIAGNOSIS — N21.0 BLADDER STONES: ICD-10-CM

## 2022-08-17 PROCEDURE — 74018 RADEX ABDOMEN 1 VIEW: CPT | Mod: TC | Performed by: RADIOLOGY

## 2022-08-17 PROCEDURE — 99213 OFFICE O/P EST LOW 20 MIN: CPT | Performed by: UROLOGY

## 2022-08-17 ASSESSMENT — PAIN SCALES - GENERAL: PAINLEVEL: NO PAIN (0)

## 2022-08-17 NOTE — PROGRESS NOTES
Chief Complaint   Patient presents with     Follow Up       Quan GANT Jeffrey is a 72 year old male who presents today for follow up of   Chief Complaint   Patient presents with     Follow Up    f/u for prostate cancer/bladder stone.  He is s/p RRP in the past.  He was receiving TRT with psa rising.  Recently his TRT has been discontinued, his psa was 0.17.  He is s/p bladder stone removal this year.    Current Outpatient Medications   Medication Sig Dispense Refill     ALPRAZolam (XANAX) 0.5 MG tablet Take 1 tablet (0.5 mg) by mouth 3 times daily as needed for anxiety 90 tablet 0     buPROPion (WELLBUTRIN XL) 150 MG 24 hr tablet Take 1 tablet (150 mg) by mouth every morning 30 tablet 1     cetirizine (ZYRTEC) 10 MG tablet Take 10 mg by mouth       cevimeline (EVOXAC) 30 MG capsule Take 1 capsule (30 mg) by mouth 3 times daily 120 capsule 11     citalopram (CELEXA) 40 MG tablet Take 1 tablet (40 mg) by mouth daily 90 tablet 1     dronabinol (MARINOL) 2.5 MG capsule Take 1 capsule (2.5 mg) by mouth 2 times daily (before meals) 28 capsule 3     HYDROcodone-acetaminophen (NORCO)  MG per tablet Take 1 tablet by mouth 4 times daily as needed for severe pain 120 tablet 0     levothyroxine (SYNTHROID/LEVOTHROID) 112 MCG tablet Take 1 tablet (112 mcg) by mouth daily 30 tablet 11     metoprolol succinate ER (TOPROL-XL) 50 MG 24 hr tablet Take 1 tablet (50 mg) by mouth daily 90 tablet 2     nitroGLYcerin (NITROSTAT) 0.4 MG sublingual tablet For chest pain place 1 tablet under the tongue every 5 minutes for 3 doses. If symptoms persist 5 minutes after 1st dose call 911. 25 tablet 0     omeprazole (PRILOSEC) 40 MG DR capsule Take 1 capsule (40 mg) by mouth daily 90 capsule 3     pilocarpine (SALAGEN) 5 MG tablet Take 1 tablet (5 mg) by mouth 3 times daily 90 tablet 3     rosuvastatin (CRESTOR) 40 MG tablet Take 1 tablet (40 mg) by mouth daily 90 tablet 3     zolpidem ER (AMBIEN CR) 12.5 MG CR tablet TAKE ONE TABLET BY MOUTH  NIGHTLY AS NEEDED FOR SLEEP 30 tablet 3     Allergies   Allergen Reactions     Animal Dander      Azithromycin Nausea and Vomiting     Dust Mites      Pollen Extract      Smoke.       Past Medical History:   Diagnosis Date     Allergic rhinitis      Allergy, unspecified not elsewhere classified     Seasonal allergies, pollen, dust, smoke and animals     Antiplatelet or antithrombotic long-term use      Anxiety      Arthritis      Chest pain      Chronic sinusitis      Coronary atherosclerosis of unspecified type of vessel, native or graft     Coronary artery disease     Depressive disorder 1995     Gastroesophageal reflux disease 2020    Cleared with medication     Head injury 1954     Hiatal hernia 2015    Right Side     History of blood transfusion 12/15/2004    Prostate Surgery - My own blood     Hyperlipidemia      Hypertension      Inguinal hernia      Kidney problem 10/08/2017    Lithotripsy     Kidney stones      Malignant neoplasm of prostate (H)     Prostate cancer     Prostate cancer (H)      Syncope      Past Surgical History:   Procedure Laterality Date     ARTHRODESIS FOOT  7/23/2013    Procedure: ARTHRODESIS FOOT;  Great Toe Arthrodesis Left Foot;  Surgeon: Ash Gonzalez DPM;  [facility]     ARTHRODESIS FOOT  6/10/2014    Procedure: ARTHRODESIS FOOT;  Surgeon: Ash Gonzalez DPM;  [facility]     BIOPSY  10/1/2004    dermatologist biopsies     COLONOSCOPY  10/7/2013    Procedure: COLONOSCOPY;  Colonoscopy;  Surgeon: Mike Fallon MD;  [facility]     CYSTOSCOPY N/A 2/16/2022    Procedure: CYSTOSCOPY and bladder stone removal;  Surgeon: David Rogers MD;  [facility]     ESOPHAGOSCOPY, GASTROSCOPY, DUODENOSCOPY (EGD), COMBINED N/A 2/12/2020    Procedure: ESOPHAGOGASTRODUODENOSCOPY (EGD);  Surgeon: Sam Escobar MD;  [facility]     EXTRACORPOREAL SHOCK WAVE LITHOTRIPSY (ESWL) Bilateral 10/18/2017    Procedure: EXTRACORPOREAL SHOCK WAVE LITHOTRIPSY (ESWL);   BILATERAL EXTRACORPOREAL SHOCKWAVE LITHOTRIPSY ;  Surgeon: Meir Torres MD;  Location: [facility]     HC CORRECT BUNION,SIMPLE  08/11/2005    x3     HC REMV TOE BENIGN BONE LESN  08/11/2005     HEAD & NECK SURGERY  1954    From a fall     HERNIORRHAPHY INGUINAL  7/3/2013    Procedure: HERNIORRHAPHY INGUINAL;  Open Repair Inguinal hernia Right with mesh ;  Surgeon: Sam Escobar MD;  [facility]     IR GASTROSTOMY TUBE PERCUTANEOUS PLCMNT  6/7/2021     MOHS MICROGRAPHIC PROCEDURE  08/23/11    ear and chin-[facility]     OPEN REDUCTION INTERNAL FIXATION WRIST Right 7/18/2017    Procedure: OPEN REDUCTION INTERNAL FIXATION WRIST;  Right distal radius open reduction and internal fixation;  Surgeon: Pedro Blanca DO;  [facility]     RECONSTRUCT FOREFOOT WITH METATARSOPHALANGEAL (MTP) FUSION  6/10/2014    Procedure: RECONSTRUCT FOREFOOT WITH METATARSOPHALANGEAL (MTP) FUSION;  Surgeon: Ash Gonzalez DPM;  [facility]     STENT, CORONARY, DEMI       SURGICAL HISTORY OF -   1999/1974    lt knee     SURGICAL HISTORY OF -   10/2004    lithotripsy     SURGICAL HISTORY OF -   11/05    angiogram with stent     VASCULAR SURGERY  11/17/2005    Puncture of iliac artery during and angiogram     [facility] LAPAROSCOPY, SURGICAL PROSTATECTOMY, RETROPUBIC RADICAL, W/NERVE SPARING  11/30/2004    With full bilateral pelvic lymphadenectomy.  [facility].     [facility] TOTAL KNEE ARTHROPLASTY  05/01/08    Left knee     Family History   Problem Relation Age of Onset     Hypertension Father         Lived to age 87     Connective Tissue Disorder Mother         LUPUS     Heart Disease Mother         Valve replacement     Anxiety Disorder Mother         Lived to age 84     Dementia Mother         Nursing Home (lived to age 86)     Social History     Socioeconomic History     Marital status:      Spouse name: Alessandra     Number of children: 2     Years of education: None     Highest education level: None    Occupational History     Occupation: Retired   Tobacco Use     Smoking status: Never Smoker     Smokeless tobacco: Never Used   Substance and Sexual Activity     Alcohol use: Yes     Alcohol/week: 8.3 standard drinks     Types: 10 Cans of beer per week     Comment: occasional     Drug use: No     Sexual activity: Yes     Partners: Female     Birth control/protection: Female Surgical   Other Topics Concern      Service Not Asked     Blood Transfusions Not Asked     Caffeine Concern Not Asked     Occupational Exposure Not Asked     Hobby Hazards Not Asked     Sleep Concern Not Asked     Stress Concern Not Asked     Weight Concern Not Asked     Special Diet Not Asked     Back Care Not Asked     Exercise Not Asked     Bike Helmet Not Asked     Seat Belt Not Asked     Self-Exams Not Asked     Parent/sibling w/ CABG, MI or angioplasty before 65F 55M? No   Social History Narrative     None     Social Determinants of Health     Financial Resource Strain: Not on file   Food Insecurity: Not on file   Transportation Needs: Not on file   Physical Activity: Not on file   Stress: Not on file   Social Connections: Not on file   Intimate Partner Violence: Not on file   Housing Stability: Not on file       REVIEW OF SYSTEMS  =================  C: NEGATIVE for fever, chills, change in weight  I: NEGATIVE for worrisome rashes, moles or lesions  E/M: NEGATIVE for ear, mouth and throat problems  R: NEGATIVE for significant cough or SHORTNESS OF BREATH  CV:  NEGATIVE for chest pain, palpitations or peripheral edema  GI: NEGATIVE for nausea, abdominal pain, heartburn, or change in bowel habits  NEURO: NEGATIVE numbness/weakness  : see HPI  PSYCH: NEGATIVE depression/anxiety  LYmph: no new enlarged lymph nodes  Ortho: no new trauma/movements    Physical Exam:  /80   Pulse 67   Resp 16   Wt 87.6 kg (193 lb 3.2 oz)   SpO2 100%   BMI 28.95 kg/m     GENERAL: Healthy, alert and no distress  EYES: Eyes grossly normal to  inspection.  No discharge or erythema, or obvious scleral/conjunctival abnormalities.  RESP: No audible wheeze, cough, or visible cyanosis.  No visible retractions or increased work of breathing.    SKIN: Visible skin clear. No significant rash, abnormal pigmentation or lesions.  NEURO: Cranial nerves grossly intact.  Mentation and speech appropriate for age.  PSYCH: Mentation appears normal, affect normal/bright, judgement and insight intact, normal speech and appearance well-groomed.  Assessment/Plan:   (C61) Malignant neoplasm of prostate (H)  (primary encounter diagnosis)  Comment:    Plan: recheck psa in one year    (N21.0) Bladder stone  Comment:  S/p removal.  KUB neg.  Plan: prn

## 2022-08-17 NOTE — PROGRESS NOTES
Dear Dr. Christensen:    I had the pleasure of seeing Quan Murphy in follow-up today at the [facility].     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 The patient has a history of foreign body sensation on the left side starting a few months ago. He tried gargling and using a neti pot with no improvement. He also developed left neck discomfort which radiates into his head. He saw primary care on 3/4/2021. He was treated with a course of antibiotics with no improvement. He saw seen by Dr Christensen on 3/22/2021. At that time he had an enlarged left tonsil. A biopsy was performed at that time which was consistent with a p16+ SCC. He had a CT scan on 3/23/2021 which showed a 2.9 x 2.7 x 3.5 cm left tonsil cancer, involving the soft palate, left level II and III lymphadenopathy. He had a PET scan performed today which showed the primary tumor in the tonsil and soft palate, left-sided lymphadenopathy with SHAYNE present (3.4 x 2.0 cm, 1.8 x 1.8 cm), right-sided node (1.4 x 0.9 cm) with FDG activity, no distant disease.    He was treated with definitive chemoradiation from 4/28/2021 to 6/12/2021. He had a total of 6996 cGy under the care of Dr Robbins. He had weekly cisplatin for a total of 5 cycles under the care of Dr Schrader. He had a difficult time with pain control during his treatment secondary to his chronic pain medication use and pain medications were managed by palliative care. He was hospitalized from 6/2/2021 to 6/9/2021 for failure to thrive. He had reactive PEG tube placement on 6/7/2021 due to inability to maintain adequate nutrition and intolerance of the idea of an NG tube.  His PET scan was negative in September 2021. Dr Christensen performed a myringotomy in November 2021 for his ear issues.       Interval history:  He comes in today for follow-up. He was last seen in May 2022. He had scans at that time, comes  in with repeat neck CT to reevaluate indeterminate left level IIA nodes noted. He has seen his PCP for anxiety/chronic pain, recommended for pain clinic and weaning of medications. His TSH this week was low (0.06), normal T4, decreased synthroid dose last visit.    He says that he continues to have dry mouth, needs a refill of salagen. He has some intermittent soreness on the left side. He is drinking without issues. He has no coughing or choking. His weight is stable. He is doing his swallowing and jaw exercises. He is doing his lymphedema therapy. He is no longer using protein shakes. He has no ear pain. His taste fluctuates.        MEDICATIONS:     Current Outpatient Medications   Medication Sig Dispense Refill     ALPRAZolam (XANAX) 0.5 MG tablet Take 1 tablet (0.5 mg) by mouth 3 times daily as needed for anxiety 90 tablet 0     buPROPion (WELLBUTRIN XL) 150 MG 24 hr tablet Take 1 tablet (150 mg) by mouth every morning 30 tablet 1     cetirizine (ZYRTEC) 10 MG tablet Take 10 mg by mouth       cevimeline (EVOXAC) 30 MG capsule Take 1 capsule (30 mg) by mouth 3 times daily 120 capsule 11     citalopram (CELEXA) 40 MG tablet Take 1 tablet (40 mg) by mouth daily 90 tablet 1     dronabinol (MARINOL) 2.5 MG capsule Take 1 capsule (2.5 mg) by mouth 2 times daily (before meals) 28 capsule 3     HYDROcodone-acetaminophen (NORCO)  MG per tablet Take 1 tablet by mouth 4 times daily as needed for severe pain 120 tablet 0     levothyroxine (SYNTHROID/LEVOTHROID) 112 MCG tablet Take 1 tablet (112 mcg) by mouth daily 30 tablet 11     levothyroxine (SYNTHROID/LEVOTHROID) 75 MCG tablet Take 1 tablet (75 mcg) by mouth daily 30 tablet 11     metoprolol succinate ER (TOPROL-XL) 50 MG 24 hr tablet Take 1 tablet (50 mg) by mouth daily 90 tablet 2     nitroGLYcerin (NITROSTAT) 0.4 MG sublingual tablet For chest pain place 1 tablet under the tongue every 5 minutes for 3 doses. If symptoms persist 5 minutes after 1st dose call  911. 25 tablet 0     omeprazole (PRILOSEC) 40 MG DR capsule Take 1 capsule (40 mg) by mouth daily 90 capsule 3     pilocarpine (SALAGEN) 5 MG tablet Take 1 tablet (5 mg) by mouth 3 times daily 90 tablet 3     rosuvastatin (CRESTOR) 40 MG tablet Take 1 tablet (40 mg) by mouth daily 90 tablet 3     zolpidem ER (AMBIEN CR) 12.5 MG CR tablet TAKE ONE TABLET BY MOUTH NIGHTLY AS NEEDED FOR SLEEP 30 tablet 3       ALLERGIES:    Allergies   Allergen Reactions     Animal Dander      Azithromycin Nausea and Vomiting     Dust Mites      Pollen Extract      Smoke.        HABITS/SOCIAL HISTORY:   Nonsmoker. Chewing tobacco use occasionally when fishing. . Drink 2-3 beers per day.   Lives with wife (nurse).   Retired .     Social History     Socioeconomic History     Marital status:      Spouse name: Alessandra     Number of children: 2     Years of education: Not on file     Highest education level: Not on file   Occupational History     Occupation: Retired   Tobacco Use     Smoking status: Never Smoker     Smokeless tobacco: Never Used   Vaping Use     Vaping Use: Never used   Substance and Sexual Activity     Alcohol use: Yes     Types: 10 Cans of beer per week     Comment: Moderate     Drug use: No     Sexual activity: Yes     Partners: Female     Birth control/protection: Female Surgical   Other Topics Concern      Service Not Asked     Blood Transfusions Not Asked     Caffeine Concern Not Asked     Occupational Exposure Not Asked     Hobby Hazards Not Asked     Sleep Concern Not Asked     Stress Concern Not Asked     Weight Concern Not Asked     Special Diet Not Asked     Back Care Not Asked     Exercise Not Asked     Bike Helmet Not Asked     Seat Belt Not Asked     Self-Exams Not Asked     Parent/sibling w/ CABG, MI or angioplasty before 65F 55M? No   Social History Narrative     Not on file     Social Determinants of Health     Financial Resource Strain: Not on file   Food Insecurity:  Not on file   Transportation Needs: Not on file   Physical Activity: Not on file   Stress: Not on file   Social Connections: Not on file   Intimate Partner Violence: Not on file   Housing Stability: Not on file       PAST MEDICAL HISTORY:   Past Medical History:   Diagnosis Date     Allergic rhinitis      Allergy, unspecified not elsewhere classified     Seasonal allergies, pollen, dust, smoke and animals     Antiplatelet or antithrombotic long-term use      Anxiety      Arthritis      Chest pain      Chronic sinusitis      Coronary atherosclerosis of unspecified type of vessel, native or graft     Coronary artery disease     Depressive disorder 1995     Gastroesophageal reflux disease 2020    Cleared with medication     Head injury 1954     Hiatal hernia 2015    Right Side     History of blood transfusion 12/15/2004    Prostate Surgery - My own blood     Hyperlipidemia      Hypertension      Inguinal hernia      Kidney problem 10/08/2017    Lithotripsy     Kidney stones      Malignant neoplasm of prostate (H)     Prostate cancer     Prostate cancer (H)      Syncope         PAST SURGICAL HISTORY:   Past Surgical History:   Procedure Laterality Date     ARTHRODESIS FOOT  7/23/2013    Procedure: ARTHRODESIS FOOT;  Great Toe Arthrodesis Left Foot;  Surgeon: Ash Gonzalez DPM;  [facility]     ARTHRODESIS FOOT  6/10/2014    Procedure: ARTHRODESIS FOOT;  Surgeon: Ash Gonzalez DPM;  [facility]     BIOPSY  10/1/2004    dermatologist biopsies     COLONOSCOPY  10/7/2013    Procedure: COLONOSCOPY;  Colonoscopy;  Surgeon: Mike Fallon MD;  Location:  GI     CYSTOSCOPY N/A 2/16/2022    Procedure: CYSTOSCOPY and bladder stone removal;  Surgeon: David Rogers MD;  Location:  OR     ESOPHAGOSCOPY, GASTROSCOPY, DUODENOSCOPY (EGD), COMBINED N/A 2/12/2020    Procedure: ESOPHAGOGASTRODUODENOSCOPY (EGD);  Surgeon: Sam Escobar MD;  Location:  GI     EXTRACORPOREAL SHOCK WAVE LITHOTRIPSY  (ESWL) Bilateral 10/18/2017    Procedure: EXTRACORPOREAL SHOCK WAVE LITHOTRIPSY (ESWL);  BILATERAL EXTRACORPOREAL SHOCKWAVE LITHOTRIPSY ;  Surgeon: Meir Torres MD;  Location: [facility]     HC CORRECT BUNION,SIMPLE  08/11/2005    x3     HC REMV TOE BENIGN BONE LESN  08/11/2005     HEAD & NECK SURGERY  1954    From a fall     HERNIORRHAPHY INGUINAL  7/3/2013    Procedure: HERNIORRHAPHY INGUINAL;  Open Repair Inguinal hernia Right with mesh ;  Surgeon: Sam Escobar MD;  [facility]: [facility]     IR GASTROSTOMY TUBE PERCUTANEOUS PLCMNT  6/7/2021     MOHS MICROGRAPHIC PROCEDURE  08/23/11    ear and chin-[facility]     OPEN REDUCTION INTERNAL FIXATION WRIST Right 7/18/2017    Procedure: OPEN REDUCTION INTERNAL FIXATION WRIST;  Right distal radius open reduction and internal fixation;  Surgeon: Pedro Blanca DO;  [facility]     RECONSTRUCT FOREFOOT WITH METATARSOPHALANGEAL (MTP) FUSION  6/10/2014    Procedure: RECONSTRUCT FOREFOOT WITH METATARSOPHALANGEAL (MTP) FUSION;  Surgeon: Ash Gonzalez DPM;  [facility]: [facility]     STENT, CORONARY, DEMI       SURGICAL HISTORY OF -   1999/1974    lt knee     SURGICAL HISTORY OF -   10/2004    lithotripsy     SURGICAL HISTORY OF -   11/05    angiogram with stent     VASCULAR SURGERY  11/17/2005    Puncture of iliac artery during and angiogram     [facility] LAPAROSCOPY, SURGICAL PROSTATECTOMY, RETROPUBIC RADICAL, W/NERVE SPARING  11/30/2004    With full bilateral pelvic lymphadenectomy.  [facility].     [facility] TOTAL KNEE ARTHROPLASTY  05/01/08    Left knee       FAMILY HISTORY:    Family History   Problem Relation Age of Onset     Hypertension Father         Lived to age 87     Connective Tissue Disorder Mother         LUPUS     Heart Disease Mother         Valve replacement     Anxiety Disorder Mother         Lived to age 84     Dementia Mother         Nursing Home (lived to age 86)       REVIEW OF SYSTEMS:  12 point ROS was negative other than the symptoms noted  "above in the HPI.  Patient Supplied Answers to Review of Systems   ENT ROS 8/13/2022   Constitutional: Problems with sleep   Neurology: -   Psychology: -   Ears, Nose, Throat: Nasal congestion or drainage   Musculoskeletal: Sore or stiff joints   Allergy/Immunology: Allergies or hay fever   Hematologic: -         PHYSICAL EXAMINATION:   BP 99/68   Pulse 63   Temp 97.4  F (36.3  C)   Ht 1.727 m (5' 8\")   Wt 88 kg (194 lb)   SpO2 97%   BMI 29.50 kg/m     Appearance:   normal; NAD, age-appropriate appearance, well-developed, normal habitus   Communication:   normal; communicates verbally, normal voice quality   Head/Face:   inspection -  Normal; no scars or visible lesions   Salivary glands -  Normal size, no tenderness, swelling, or palpable masses   Facial strength -  Normal and symmetric    Skin:  normal, no rash   Ears:  auricle (AD) -  normal  EAC (AD) -  normal  TM (AD) -  Normal, no effusion  auricle (AS) -  normal  EAC (AS) -  normal  TM (AS) -  Normal, no effusion  Normal clinical speech reception   Nose:  Ext. inspection -  Normal  Internal Inspection -  Normal mucosa, septum, and turbinates   Nasopharynx:  normal mucosa, no masses   Oral Cavity:  lips -  Normal mucosa, oral competence, and stoma size   Age-appropriate dentition, healthy gingival mucosa  Moderately dry mucus membranes   Hard palate, buccal, floor of mouth mucosa with radiation changes   Tongue - normal movement, no masses or mucosal lesions   Oropharynx:  There are radiation changes to the mucosa throughout with scattered telangiectasias  Retraction of the mucosa of the palate and tonsillar fossa, left greater than right, left superior pole of tonsil with circular fluctuant appearing lesion about 4 mm in size, no palpable masses  There are no secretions in the vallecula  Palpation is limited of the oropharynx due to gag reflex, no palpable masses, no mucosal lesions  Base of tongue - no masses or mucosal lesions  No residual disease "   Hypopharynx:  Normal pyriform sinus    No pooled secretions    Larynx:  Epiglottis, false vocal cords, true vocal cords normal in appearance, bilaterally mobile cords   The left arytenoid and AE fold have mild radiation edema that is not obstructive    Neck: No palpable masses  Mild submental lymphedema  Mild fibrosis bilateral neck   Lymphatic:  No palpable masses   Cardiovascular:  warm, pink, well-perfused extremities without swelling, tenderness, or edema   Respiratory:  Normal respiratory effort, no stridor   Neuro/Psych.:  mood/affect -  normal  mental status -  normal         PROCEDURES:   Flexible fiberoptic laryngoscopy: Scope exam was indicated due to tonsil cancer. Verbal consent was obtained. The nasal cavity was prepped with an aerosolized solution of topical anesthetic and vasoconstrictive agent. The scope was passed through the anterior nasal cavity and advanced. Inspection of the nasopharynx revealed no gross abnormality.  There are no masses or mucosal lesions in the left tonsil/oropharynx.  The epiglottis has very minimal thickening.  There is radiation edema of the left AE fold and left arytenoid.  This is not obstructive.  The vocal cords are mobile bilaterally.  The piriform sinuses are clear. The airway is patent. Procedure tolerated well with no immediate complications noted.    Tonsil biopsy: The left superior tonsil was injected with 1% lidocaine with 1:100,000 epinephrine. The biopsy forceps were used to sample the lesion superficially and then more deeply. The tissue was fibrotic. The specimen was placed in formalin. There was no bleeding from the biopsy site but the area was treated topically with silver nitrate cautery. Patient tolerated the procedure well with no immediate complications.            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 He completed definitive chemoradiation on 6/12/2021.    He had imaging today. There is a lesion of the left superior tonsil seen on imaging. There was a fluctuant appearing mucosal lesion on the superior tonsil/soft palate which was biopsied today. We will contact him with the results.    His salagen was refilled today.    He is doing his lymphedema therapy at home.    He is on synthroid, decreased dose last visit due to low TSH, still low today, will reduce dose again and new script sent to pharmacy. Will recheck labs in 3 months.    I will see him back in 3 months, multi D clinic in 6 months pending the results of the biopsy.    Thank you very much for the opportunity to participate in the care of your patient.      Ligia Fang MD  Otolaryngology- Head & Neck Surgery      This note was dictated with voice recognition software and then edited. Please excuse any unintentional errors.         CC:  Ahmet Robbins MD  Department of Radiation Oncology  [facility]      Too Christensen MD  [address] [place] [address]      Shanthi Schrader MD  Department of Medical Oncology  [facility]

## 2022-08-17 NOTE — PROGRESS NOTES
Department of Radiation Oncology  [facility]  [address] [place] [address]  [phone]       Radiation Oncology Follow-up Visit  2022      Quan Murphy  MRN: [id number]   : 1950     DISEASE TREATED:   cT2 N2 M0 p16 positive squamous cell carcinoma of the left tonsil    RADIATION THERAPY DELIVERED:   Left oropharynx and neck: 6996 cGy in 33 fractions, from 2021 - 2021    SYSTEMIC THERAPY:  Cisplatin 40 mg/m  weekly x5 weeks    INTERVAL SINCE COMPLETION OF RADIATION THERAPY:   14 months    SUBJECTIVE:   Quan Murphy is a 72 year old male with a PMH significant for a stage II p16-positive squamous cell carcinoma of the left tonsil diagnosed in 3/2021 after he presented with a 1 month history of left-sided otalgia and globus sensation. Staging evaluation revealed a 2.7 x 2.2 x 2.7 cm left tonsillar primary tumor with multiple involved cervical lymph nodes including a 1.8 cm left level 2/3 node, a 3.4 cm left level 4 node and a suspicious 1.4 cm contralateral right level 2 node. He received curative intent chemoradiotherapy with concurrent weekly cisplatin as described above.    Mr. Murphy returns to ENT multiD clinic today for a routine posttreatment disease surveillance visit. On exam, he describes a left-sided sore throat which comes and goes. He reports that he is eating and drinking okay without any coughing or choking episodes although continues to have treatment-induced xerostomia and difficulty with very dry foods. He has been compliant with his lymphedema therapy as well as his SLP exercises. He underwent a repeat CT neck earlier today. Although the official radiology read is currently pending, review in clinic demonstrates a new enhancing lesion involving the left tonsillar fossa/soft palate with otherwise no evidence of suspicious cervical adenopathy.    PHYSICAL EXAM:  Weight: 88 kg  BP: 99/68  Pulse: 63    General: Fatigued  appearing 72-year-old gentleman seated in a chair in no acute distress  HEENT: NC/AT. EACs clear bilaterally with normal-appearing TMs. No rhinorrhea or epistaxis. Mildly dry mucous membranes with thickened oral cavity secretions. There is a 5 mm raised mucosal lesion that is mildly erythematous located immediately superior to the pole of the left tonsil on the left lateral soft palate. The mass is unable to be palpated secondary to the patient's strong gag reflex however palpation of the remainder of the oropharynx reveals no discrete nodularity or induration.  Neck: Mild to moderate submental lymphedema. Moderate fibrosis of the left neck. No palpable cervical adenopathy bilaterally.  Pulmonary: No wheezing, stridor or respiratory distress  Skin: Mild pallor. No erythema.    Dr. Fang performed a flexible nasopharyngoscopy in clinic today. Please see her note regarding complete details from this procedure. Briefly, examination of the nasopharynx revealed normal-appearing mucosa with no visible lesions. Passage of the scope into the oropharynx likewise revealed post-treatment changes with no evidence of recurrent disease.    Dr. Fang performed a biopsy of the aforementioned left soft palate lesion. Several tissue samples were collected and sent to pathology for permanent section.    LABS AND IMAGIN2022 CT neck:  Official radiology read currently pending. Review in clinic demonstrates a new rim-enhancing mass located within the left soft palate with no suspicious cervical adenopathy.    2022 labs:  TSH: 0.06  Free T4: 1.19    IMPRESSION:   Mr. 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 He is just over a year out from completion of treatment and has a new lesion located on the left lateral soft palate just superior to the pole of the left tonsil.    PLAN:   1. Follow-up finalized imaging results from  today  2. Follow-up biopsy results from today  3. If pathology demonstrates recurrent disease, will need a restaging PET and likely salvage surgical resection. If it is instead benign/inflammatory, will repeat a CT neck in 3 months.  4. Recheck TSH in 2/2023    Ahmet Robbins MD/PhD    Department of Radiation Oncology  [facility]

## 2022-08-19 ENCOUNTER — OFFICE VISIT (OUTPATIENT)
Dept: OTOLARYNGOLOGY | Facility: CLINIC | Age: 72
End: 2022-08-19
Payer: MEDICARE

## 2022-08-19 ENCOUNTER — ANCILLARY PROCEDURE (OUTPATIENT)
Dept: CT IMAGING | Facility: CLINIC | Age: 72
End: 2022-08-19
Attending: OTOLARYNGOLOGY
Payer: MEDICARE

## 2022-08-19 ENCOUNTER — APPOINTMENT (OUTPATIENT)
Dept: LAB | Facility: CLINIC | Age: 72
End: 2022-08-19
Payer: MEDICARE

## 2022-08-19 VITALS
SYSTOLIC BLOOD PRESSURE: 99 MMHG | HEIGHT: 68 IN | TEMPERATURE: 97.4 F | BODY MASS INDEX: 29.4 KG/M2 | OXYGEN SATURATION: 97 % | WEIGHT: 194 LBS | DIASTOLIC BLOOD PRESSURE: 68 MMHG | HEART RATE: 63 BPM

## 2022-08-19 DIAGNOSIS — C09.9 SQUAMOUS CELL CARCINOMA OF TONSIL (H): ICD-10-CM

## 2022-08-19 DIAGNOSIS — C09.9 SQUAMOUS CELL CARCINOMA OF TONSIL (H): Primary | ICD-10-CM

## 2022-08-19 DIAGNOSIS — E03.4 HYPOTHYROIDISM DUE TO ACQUIRED ATROPHY OF THYROID: ICD-10-CM

## 2022-08-19 DIAGNOSIS — R68.2 DRY MOUTH: ICD-10-CM

## 2022-08-19 LAB
CREAT BLD-MCNC: 1.4 MG/DL (ref 0.7–1.3)
GFR SERPL CREATININE-BSD FRML MDRD: 54 ML/MIN/1.73M2

## 2022-08-19 PROCEDURE — 70491 CT SOFT TISSUE NECK W/DYE: CPT | Mod: MG | Performed by: RADIOLOGY

## 2022-08-19 PROCEDURE — 82565 ASSAY OF CREATININE: CPT | Performed by: PATHOLOGY

## 2022-08-19 PROCEDURE — 88360 TUMOR IMMUNOHISTOCHEM/MANUAL: CPT | Mod: 26 | Performed by: PATHOLOGY

## 2022-08-19 PROCEDURE — 42800 BIOPSY OF THROAT: CPT | Performed by: OTOLARYNGOLOGY

## 2022-08-19 PROCEDURE — 31575 DIAGNOSTIC LARYNGOSCOPY: CPT | Mod: 51 | Performed by: OTOLARYNGOLOGY

## 2022-08-19 PROCEDURE — 88305 TISSUE EXAM BY PATHOLOGIST: CPT | Mod: TC | Performed by: OTOLARYNGOLOGY

## 2022-08-19 PROCEDURE — 88342 IMHCHEM/IMCYTCHM 1ST ANTB: CPT | Mod: 26 | Performed by: PATHOLOGY

## 2022-08-19 PROCEDURE — 99214 OFFICE O/P EST MOD 30 MIN: CPT | Mod: 25 | Performed by: OTOLARYNGOLOGY

## 2022-08-19 PROCEDURE — 88305 TISSUE EXAM BY PATHOLOGIST: CPT | Mod: 26 | Performed by: PATHOLOGY

## 2022-08-19 PROCEDURE — 99214 OFFICE O/P EST MOD 30 MIN: CPT | Performed by: RADIOLOGY

## 2022-08-19 PROCEDURE — G1004 CDSM NDSC: HCPCS | Mod: GC | Performed by: RADIOLOGY

## 2022-08-19 RX ORDER — PILOCARPINE HYDROCHLORIDE 5 MG/1
5 TABLET, FILM COATED ORAL 3 TIMES DAILY
Qty: 90 TABLET | Refills: 3 | Status: SHIPPED | OUTPATIENT
Start: 2022-08-19 | End: 2023-02-08

## 2022-08-19 RX ORDER — LEVOTHYROXINE SODIUM 75 UG/1
75 TABLET ORAL DAILY
Qty: 30 TABLET | Refills: 11 | Status: SHIPPED | OUTPATIENT
Start: 2022-08-19 | End: 2023-01-17

## 2022-08-19 RX ORDER — IOPAMIDOL 755 MG/ML
90 INJECTION, SOLUTION INTRAVASCULAR ONCE
Status: COMPLETED | OUTPATIENT
Start: 2022-08-19 | End: 2022-08-19

## 2022-08-19 RX ADMIN — IOPAMIDOL 90 ML: 755 INJECTION, SOLUTION INTRAVASCULAR at 12:49

## 2022-08-19 ASSESSMENT — PAIN SCALES - GENERAL: PAINLEVEL: NO PAIN (0)

## 2022-08-19 NOTE — LETTER
8/19/2022       RE: Quan Murphy  [address]  [facility] [address]     Dear Colleague,    Thank you for referring your patient, Quan Murphy, to the [facility] EAR NOSE AND THROAT CLINIC [place] at [facility]. Please see a copy of my visit note below.    Dear Dr. Christensen:    I had the pleasure of seeing Quan Murphy in follow-up today at the [facility].     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 The patient has a history of foreign body sensation on the left side starting a few months ago. He tried gargling and using a neti pot with no improvement. He also developed left neck discomfort which radiates into his head. He saw primary care on 3/4/2021. He was treated with a course of antibiotics with no improvement. He saw seen by Dr Christensen on 3/22/2021. At that time he had an enlarged left tonsil. A biopsy was performed at that time which was consistent with a p16+ SCC. He had a CT scan on 3/23/2021 which showed a 2.9 x 2.7 x 3.5 cm left tonsil cancer, involving the soft palate, left level II and III lymphadenopathy. He had a PET scan performed today which showed the primary tumor in the tonsil and soft palate, left-sided lymphadenopathy with SHAYNE present (3.4 x 2.0 cm, 1.8 x 1.8 cm), right-sided node (1.4 x 0.9 cm) with FDG activity, no distant disease.    He was treated with definitive chemoradiation from 4/28/2021 to 6/12/2021. He had a total of 6996 cGy under the care of Dr Robbins. He had weekly cisplatin for a total of 5 cycles under the care of Dr Schrader. He had a difficult time with pain control during his treatment secondary to his chronic pain medication use and pain medications were managed by palliative care. He was hospitalized from 6/2/2021 to 6/9/2021 for failure to thrive. He had reactive PEG tube placement on 6/7/2021 due to  inability to maintain adequate nutrition and intolerance of the idea of an NG tube.  His PET scan was negative in September 2021. Dr Christesnen performed a myringotomy in November 2021 for his ear issues.       Interval history:  He comes in today for follow-up. He was last seen in May 2022. He had scans at that time, comes in with repeat neck CT to reevaluate indeterminate left level IIA nodes noted. He has seen his PCP for anxiety/chronic pain, recommended for pain clinic and weaning of medications. His TSH this week was low (0.06), normal T4, decreased synthroid dose last visit.    He says that he continues to have dry mouth, needs a refill of salagen. He has some intermittent soreness on the left side. He is drinking without issues. He has no coughing or choking. His weight is stable. He is doing his swallowing and jaw exercises. He is doing his lymphedema therapy. He is no longer using protein shakes. He has no ear pain. His taste fluctuates.        MEDICATIONS:     Current Outpatient Medications   Medication Sig Dispense Refill     ALPRAZolam (XANAX) 0.5 MG tablet Take 1 tablet (0.5 mg) by mouth 3 times daily as needed for anxiety 90 tablet 0     buPROPion (WELLBUTRIN XL) 150 MG 24 hr tablet Take 1 tablet (150 mg) by mouth every morning 30 tablet 1     cetirizine (ZYRTEC) 10 MG tablet Take 10 mg by mouth       cevimeline (EVOXAC) 30 MG capsule Take 1 capsule (30 mg) by mouth 3 times daily 120 capsule 11     citalopram (CELEXA) 40 MG tablet Take 1 tablet (40 mg) by mouth daily 90 tablet 1     dronabinol (MARINOL) 2.5 MG capsule Take 1 capsule (2.5 mg) by mouth 2 times daily (before meals) 28 capsule 3     HYDROcodone-acetaminophen (NORCO)  MG per tablet Take 1 tablet by mouth 4 times daily as needed for severe pain 120 tablet 0     levothyroxine (SYNTHROID/LEVOTHROID) 112 MCG tablet Take 1 tablet (112 mcg) by mouth daily 30 tablet 11     levothyroxine (SYNTHROID/LEVOTHROID) 75 MCG tablet Take 1 tablet (75  mcg) by mouth daily 30 tablet 11     metoprolol succinate ER (TOPROL-XL) 50 MG 24 hr tablet Take 1 tablet (50 mg) by mouth daily 90 tablet 2     nitroGLYcerin (NITROSTAT) 0.4 MG sublingual tablet For chest pain place 1 tablet under the tongue every 5 minutes for 3 doses. If symptoms persist 5 minutes after 1st dose call 911. 25 tablet 0     omeprazole (PRILOSEC) 40 MG DR capsule Take 1 capsule (40 mg) by mouth daily 90 capsule 3     pilocarpine (SALAGEN) 5 MG tablet Take 1 tablet (5 mg) by mouth 3 times daily 90 tablet 3     rosuvastatin (CRESTOR) 40 MG tablet Take 1 tablet (40 mg) by mouth daily 90 tablet 3     zolpidem ER (AMBIEN CR) 12.5 MG CR tablet TAKE ONE TABLET BY MOUTH NIGHTLY AS NEEDED FOR SLEEP 30 tablet 3       ALLERGIES:    Allergies   Allergen Reactions     Animal Dander      Azithromycin Nausea and Vomiting     Dust Mites      Pollen Extract      Smoke.        HABITS/SOCIAL HISTORY:   Nonsmoker. Chewing tobacco use occasionally when fishing. . Drink 2-3 beers per day.   Lives with wife (nurse).   Retired .     Social History     Socioeconomic History     Marital status:      Spouse name: Alessandra     Number of children: 2     Years of education: Not on file     Highest education level: Not on file   Occupational History     Occupation: Retired   Tobacco Use     Smoking status: Never Smoker     Smokeless tobacco: Never Used   Vaping Use     Vaping Use: Never used   Substance and Sexual Activity     Alcohol use: Yes     Types: 10 Cans of beer per week     Comment: Moderate     Drug use: No     Sexual activity: Yes     Partners: Female     Birth control/protection: Female Surgical   Other Topics Concern      Service Not Asked     Blood Transfusions Not Asked     Caffeine Concern Not Asked     Occupational Exposure Not Asked     Hobby Hazards Not Asked     Sleep Concern Not Asked     Stress Concern Not Asked     Weight Concern Not Asked     Special Diet Not Asked      Back Care Not Asked     Exercise Not Asked     Bike Helmet Not Asked     Seat Belt Not Asked     Self-Exams Not Asked     Parent/sibling w/ CABG, MI or angioplasty before 65F 55M? No   Social History Narrative     Not on file     Social Determinants of Health     Financial Resource Strain: Not on file   Food Insecurity: Not on file   Transportation Needs: Not on file   Physical Activity: Not on file   Stress: Not on file   Social Connections: Not on file   Intimate Partner Violence: Not on file   Housing Stability: Not on file       PAST MEDICAL HISTORY:   Past Medical History:   Diagnosis Date     Allergic rhinitis      Allergy, unspecified not elsewhere classified     Seasonal allergies, pollen, dust, smoke and animals     Antiplatelet or antithrombotic long-term use      Anxiety      Arthritis      Chest pain      Chronic sinusitis      Coronary atherosclerosis of unspecified type of vessel, native or graft     Coronary artery disease     Depressive disorder 1995     Gastroesophageal reflux disease 2020    Cleared with medication     Head injury 1954     Hiatal hernia 2015    Right Side     History of blood transfusion 12/15/2004    Prostate Surgery - My own blood     Hyperlipidemia      Hypertension      Inguinal hernia      Kidney problem 10/08/2017    Lithotripsy     Kidney stones      Malignant neoplasm of prostate (H)     Prostate cancer     Prostate cancer (H)      Syncope         PAST SURGICAL HISTORY:   Past Surgical History:   Procedure Laterality Date     ARTHRODESIS FOOT  7/23/2013    Procedure: ARTHRODESIS FOOT;  Great Toe Arthrodesis Left Foot;  Surgeon: Ash Gonzalez DPM;  Location:  OR     ARTHRODESIS FOOT  6/10/2014    Procedure: ARTHRODESIS FOOT;  Surgeon: Ash Gonzalez DPM;  Location:  OR     BIOPSY  10/1/2004    dermatologist biopsies     COLONOSCOPY  10/7/2013    Procedure: COLONOSCOPY;  Colonoscopy;  Surgeon: Mike Fallon MD;  Location:  GI     CYSTOSCOPY N/A 2/16/2022     Procedure: CYSTOSCOPY and bladder stone removal;  Surgeon: David Rogers MD;  Location: [facility] OR     ESOPHAGOSCOPY, GASTROSCOPY, DUODENOSCOPY (EGD), COMBINED N/A 2/12/2020    Procedure: ESOPHAGOGASTRODUODENOSCOPY (EGD);  Surgeon: Sam Escobar MD;  Location: [facility] GI     EXTRACORPOREAL SHOCK WAVE LITHOTRIPSY (ESWL) Bilateral 10/18/2017    Procedure: EXTRACORPOREAL SHOCK WAVE LITHOTRIPSY (ESWL);  BILATERAL EXTRACORPOREAL SHOCKWAVE LITHOTRIPSY ;  Surgeon: Meir Torres MD;  Location: [facility] OR     HC CORRECT BUNION,SIMPLE  08/11/2005    x3     HC REMV TOE BENIGN BONE LESN  08/11/2005     HEAD & NECK SURGERY  1954    From a fall     HERNIORRHAPHY INGUINAL  7/3/2013    Procedure: HERNIORRHAPHY INGUINAL;  Open Repair Inguinal hernia Right with mesh ;  Surgeon: Sam Escobar MD;  Location: [facility] OR     IR GASTROSTOMY TUBE PERCUTANEOUS PLCMNT  6/7/2021     MOHS MICROGRAPHIC PROCEDURE  08/23/11    ear and chin-[facility]     OPEN REDUCTION INTERNAL FIXATION WRIST Right 7/18/2017    Procedure: OPEN REDUCTION INTERNAL FIXATION WRIST;  Right distal radius open reduction and internal fixation;  Surgeon: Pedro Blanca DO;  Location: [facility] OR     RECONSTRUCT FOREFOOT WITH METATARSOPHALANGEAL (MTP) FUSION  6/10/2014    Procedure: RECONSTRUCT FOREFOOT WITH METATARSOPHALANGEAL (MTP) FUSION;  Surgeon: Ash Gonzalez DPM;  Location: [facility] OR     STENT, CORONARY, DEMI       SURGICAL HISTORY OF -   1999/1974    lt knee     SURGICAL HISTORY OF -   10/2004    lithotripsy     SURGICAL HISTORY OF -   11/05    angiogram with stent     VASCULAR SURGERY  11/17/2005    Puncture of iliac artery during and angiogram     [facility] LAPAROSCOPY, SURGICAL PROSTATECTOMY, RETROPUBIC RADICAL, W/NERVE SPARING  11/30/2004    With full bilateral pelvic lymphadenectomy.  [facility].     [facility] TOTAL KNEE ARTHROPLASTY  05/01/08    Left knee       FAMILY HISTORY:    Family History   Problem Relation Age of Onset     Hypertension Father   "       Lived to age 87     Connective Tissue Disorder Mother         LUPUS     Heart Disease Mother         Valve replacement     Anxiety Disorder Mother         Lived to age 84     Dementia Mother         Nursing Home (lived to age 86)       REVIEW OF SYSTEMS:  12 point ROS was negative other than the symptoms noted above in the HPI.  Patient Supplied Answers to Review of Systems  [facility]NT ROS 8/13/2022   Constitutional: Problems with sleep   Neurology: -   Psychology: -   Ears, Nose, Throat: Nasal congestion or drainage   Musculoskeletal: Sore or stiff joints   Allergy/Immunology: Allergies or hay fever   Hematologic: -         PHYSICAL EXAMINATION:   BP 99/68   Pulse 63   Temp 97.4  F (36.3  C)   Ht 1.727 m (5' 8\")   Wt 88 kg (194 lb)   SpO2 97%   BMI 29.50 kg/m     Appearance:   normal; NAD, age-appropriate appearance, well-developed, normal habitus   Communication:   normal; communicates verbally, normal voice quality   Head/Face:   inspection -  Normal; no scars or visible lesions   Salivary glands -  Normal size, no tenderness, swelling, or palpable masses   Facial strength -  Normal and symmetric    Skin:  normal, no rash   Ears:  auricle (AD) -  normal  EAC (AD) -  normal  TM (AD) -  Normal, no effusion  auricle (AS) -  normal  EAC (AS) -  normal  TM (AS) -  Normal, no effusion  Normal clinical speech reception   Nose:  Ext. inspection -  Normal  Internal Inspection -  Normal mucosa, septum, and turbinates   Nasopharynx:  normal mucosa, no masses   Oral Cavity:  lips -  Normal mucosa, oral competence, and stoma size   Age-appropriate dentition, healthy gingival mucosa  Moderately dry mucus membranes   Hard palate, buccal, floor of mouth mucosa with radiation changes   Tongue - normal movement, no masses or mucosal lesions   Oropharynx:  There are radiation changes to the mucosa throughout with scattered telangiectasias  Retraction of the mucosa of the palate and tonsillar fossa, left greater than right, " left superior pole of tonsil with circular fluctuant appearing lesion about 4 mm in size, no palpable masses  There are no secretions in the vallecula  Palpation is limited of the oropharynx due to gag reflex, no palpable masses, no mucosal lesions  Base of tongue - no masses or mucosal lesions  No residual disease   Hypopharynx:  Normal pyriform sinus    No pooled secretions    Larynx:  Epiglottis, false vocal cords, true vocal cords normal in appearance, bilaterally mobile cords   The left arytenoid and AE fold have mild radiation edema that is not obstructive    Neck: No palpable masses  Mild submental lymphedema  Mild fibrosis bilateral neck   Lymphatic:  No palpable masses   Cardiovascular:  warm, pink, well-perfused extremities without swelling, tenderness, or edema   Respiratory:  Normal respiratory effort, no stridor   Neuro/Psych.:  mood/affect -  normal  mental status -  normal         PROCEDURES:   Flexible fiberoptic laryngoscopy: Scope exam was indicated due to tonsil cancer. Verbal consent was obtained. The nasal cavity was prepped with an aerosolized solution of topical anesthetic and vasoconstrictive agent. The scope was passed through the anterior nasal cavity and advanced. Inspection of the nasopharynx revealed no gross abnormality.  There are no masses or mucosal lesions in the left tonsil/oropharynx.  The epiglottis has very minimal thickening.  There is radiation edema of the left AE fold and left arytenoid.  This is not obstructive.  The vocal cords are mobile bilaterally.  The piriform sinuses are clear. The airway is patent. Procedure tolerated well with no immediate complications noted.    Tonsil biopsy: The left superior tonsil was injected with 1% lidocaine with 1:100,000 epinephrine. The biopsy forceps were used to sample the lesion superficially and then more deeply. The tissue was fibrotic. The specimen was placed in formalin. There was no bleeding from the biopsy site but the area was  treated topically with silver nitrate cautery. Patient tolerated the procedure well with no immediate complications.            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 He completed definitive chemoradiation on 6/12/2021.    He had imaging today. There is a lesion of the left superior tonsil seen on imaging. There was a fluctuant appearing mucosal lesion on the superior tonsil/soft palate which was biopsied today. We will contact him with the results.    His salagen was refilled today.    He is doing his lymphedema therapy at home.    He is on synthroid, decreased dose last visit due to low TSH, still low today, will reduce dose again and new script sent to pharmacy. Will recheck labs in 3 months.    I will see him back in 3 months, multi D clinic in 6 months pending the results of the biopsy.    Thank you very much for the opportunity to participate in the care of your patient.      Ligia Fang MD  Otolaryngology- Head & Neck Surgery      This note was dictated with voice recognition software and then edited. Please excuse any unintentional errors.     CC:  Ahmet Robbins MD  Department of Radiation Oncology  [facility]      Too Christensen MD  [address] [place] [address]      Shanthi Schrader MD  Department of Medical Oncology  [facility]

## 2022-08-19 NOTE — LETTER
2022       RE: Quan Murphy   [address]  [facility] [address]     Dear Colleague,    Thank you for referring your patient, Quan Murphy, to the [facility] EAR NOSE AND THROAT CLINIC [place] at [facility]. Please see a copy of my visit note below.       Department of Radiation Oncology  [facility]  [address] [place] [address]  [phone]       Radiation Oncology Follow-up Visit  2022      Quan Murphy  MRN: [id number]   : 1950     DISEASE TREATED:   cT2 N2 M0 p16 positive squamous cell carcinoma of the left tonsil    RADIATION THERAPY DELIVERED:   Left oropharynx and neck: 6996 cGy in 33 fractions, from 2021 - 2021    SYSTEMIC THERAPY:  Cisplatin 40 mg/m  weekly x5 weeks    INTERVAL SINCE COMPLETION OF RADIATION THERAPY:   14 months    SUBJECTIVE:   Quan Murphy is a 72 year old male with a PMH significant for a stage II p16-positive squamous cell carcinoma of the left tonsil diagnosed in 3/2021 after he presented with a 1 month history of left-sided otalgia and globus sensation. Staging evaluation revealed a 2.7 x 2.2 x 2.7 cm left tonsillar primary tumor with multiple involved cervical lymph nodes including a 1.8 cm left level 2/3 node, a 3.4 cm left level 4 node and a suspicious 1.4 cm contralateral right level 2 node. He received curative intent chemoradiotherapy with concurrent weekly cisplatin as described above.    Mr. Murphy returns to ENT multiD clinic today for a routine posttreatment disease surveillance visit. On exam, he describes a left-sided sore throat which comes and goes. He reports that he is eating and drinking okay without any coughing or choking episodes although continues to have treatment-induced xerostomia and difficulty with very dry foods. He has been compliant with his lymphedema therapy as well as his SLP exercises. He underwent a repeat CT  neck earlier today. Although the official radiology read is currently pending, review in clinic demonstrates a new enhancing lesion involving the left tonsillar fossa/soft palate with otherwise no evidence of suspicious cervical adenopathy.    PHYSICAL EXAM:  Weight: 88 kg  BP: 99/68  Pulse: 63    General: Fatigued appearing 72-year-old gentleman seated in a chair in no acute distress  HEENT: NC/AT. EACs clear bilaterally with normal-appearing TMs. No rhinorrhea or epistaxis. Mildly dry mucous membranes with thickened oral cavity secretions. There is a 5 mm raised mucosal lesion that is mildly erythematous located immediately superior to the pole of the left tonsil on the left lateral soft palate. The mass is unable to be palpated secondary to the patient's strong gag reflex however palpation of the remainder of the oropharynx reveals no discrete nodularity or induration.  Neck: Mild to moderate submental lymphedema. Moderate fibrosis of the left neck. No palpable cervical adenopathy bilaterally.  Pulmonary: No wheezing, stridor or respiratory distress  Skin: Mild pallor. No erythema.    Dr. Fang performed a flexible nasopharyngoscopy in clinic today. Please see her note regarding complete details from this procedure. Briefly, examination of the nasopharynx revealed normal-appearing mucosa with no visible lesions. Passage of the scope into the oropharynx likewise revealed post-treatment changes with no evidence of recurrent disease.    Dr. Fang performed a biopsy of the aforementioned left soft palate lesion. Several tissue samples were collected and sent to pathology for permanent section.    LABS AND IMAGIN2022 CT neck:  Official radiology read currently pending. Review in clinic demonstrates a new rim-enhancing mass located within the left soft palate with no suspicious cervical adenopathy.    2022 labs:  TSH: 0.06  Free T4: 1.19    IMPRESSION:   Mr. Murphy is a 71 year old male with a cT2 N2 M0  p16 positive squamous cell carcinoma of the left tonsil status post definitive chemoradiotherapy. He is just over a year out from completion of treatment and has a new lesion located on the left lateral soft palate just superior to the pole of the left tonsil.    PLAN:   1. Follow-up finalized imaging results from today  2. Follow-up biopsy results from today  3. If pathology demonstrates recurrent disease, will need a restaging PET and likely salvage surgical resection. If it is instead benign/inflammatory, will repeat a CT neck in 3 months.  4. Recheck TSH in 2/2023    Ahmet Robbins MD/PhD    Department of Radiation Oncology  [facility]

## 2022-08-19 NOTE — NURSING NOTE
"Chief Complaint   Patient presents with     RECHECK     3 month follow up      Blood pressure 99/68, pulse 63, temperature 97.4  F (36.3  C), height 1.727 m (5' 8\"), weight 88 kg (194 lb), SpO2 97 %.    Mati Littlejohn LPN    "

## 2022-08-23 ENCOUNTER — PATIENT OUTREACH (OUTPATIENT)
Dept: OTOLARYNGOLOGY | Facility: CLINIC | Age: 72
End: 2022-08-23

## 2022-08-23 DIAGNOSIS — C09.9 SQUAMOUS CELL CARCINOMA OF TONSIL (H): Primary | ICD-10-CM

## 2022-08-23 LAB
PATH REPORT.ADDENDUM SPEC: ABNORMAL
PATH REPORT.COMMENTS IMP SPEC: ABNORMAL
PATH REPORT.COMMENTS IMP SPEC: YES
PATH REPORT.FINAL DX SPEC: ABNORMAL
PATH REPORT.GROSS SPEC: ABNORMAL
PATH REPORT.MICROSCOPIC SPEC OTHER STN: ABNORMAL
PATH REPORT.RELEVANT HX SPEC: ABNORMAL
PHOTO IMAGE: ABNORMAL

## 2022-08-23 NOTE — TELEPHONE ENCOUNTER
Called and spoke with patient regarding the following CT scan results and pathology results:    IMPRESSION:   Primary: NI-RADS 3} 1.4 x 1.5 x 1.5 cm peripherally enhancing lesion  involving the left posterolateral oropharyngeal wall. Findings are  suspicious for disease recurrence.  Neck: NI-RADS 4} Unchanged metastatic left 1 cm level IIA lymph node.     Final Diagnosis   A. LEFT SUPERIOR TONSIL, BIOPSY:  -INVASIVE NONKERATINIZING SQUAMOUS CELL CARCINOMA, see comment.     Reviewed with patient that we will need to proceed with PET scan to further evaluate. Advised Dr. Fang would like to see him back for virtual visit to further discuss. Patient agreeable to plan and scheduled for PET on 9/1. Reviewed location, and NPO instructions. Patient agreeable to this plan. All questions answered. Patient verbalized understanding and was encouraged to call with further concerns.       Chela Mariano, RN, BSN

## 2022-08-25 NOTE — PROGRESS NOTES
Dear Dr. Christensen:    I had the pleasure of seeing Quan Mruphy in follow-up today at the [facility].     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 The patient has a history of foreign body sensation on the left side starting a few months ago. He tried gargling and using a neti pot with no improvement. He also developed left neck discomfort which radiates into his head. He saw primary care on 3/4/2021. He was treated with a course of antibiotics with no improvement. He saw seen by Dr Christensen on 3/22/2021. At that time he had an enlarged left tonsil. A biopsy was performed at that time which was consistent with a p16+ SCC. He had a CT scan on 3/23/2021 which showed a 2.9 x 2.7 x 3.5 cm left tonsil cancer, involving the soft palate, left level II and III lymphadenopathy. He had a PET scan performed today which showed the primary tumor in the tonsil and soft palate, left-sided lymphadenopathy with SHAYNE present (3.4 x 2.0 cm, 1.8 x 1.8 cm), right-sided node (1.4 x 0.9 cm) with FDG activity, no distant disease.    He was treated with definitive chemoradiation from 4/28/2021 to 6/12/2021. He had a total of 6996 cGy under the care of Dr Robbins. He had weekly cisplatin for a total of 5 cycles under the care of Dr Schrader. He had a difficult time with pain control during his treatment secondary to his chronic pain medication use and pain medications were managed by palliative care. He was hospitalized from 6/2/2021 to 6/9/2021 for failure to thrive. He had reactive PEG tube placement on 6/7/2021 due to inability to maintain adequate nutrition and intolerance of the idea of an NG tube.  His PET scan was negative in September 2021. Dr Christensen performed a myringotomy in November 2021 for his ear issues.       Interval history:  He comes in today for follow-up. He was last seen in August 2022. He had a follow-up CT neck  performed which showed a peripherally enhancing lesion measuring 1.4 x 1.5 x 1.5 cm with stable left level IIA node. The patient was noted to have a lesion of the left soft palate/superior tonsil which was biopsied. 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 His case was reviewed at tumor board today with recommendations for surgery. Keytruda was a potential option if he elected against surgery.     He is feeling very anxious about the diagnosis and plan.       His synthroid dosing was adjusted at his last visit.      MEDICATIONS:     Current Outpatient Medications   Medication Sig Dispense Refill     ALPRAZolam (XANAX) 0.5 MG tablet Take 1 tablet (0.5 mg) by mouth 4 times daily as needed for anxiety 120 tablet 0     buPROPion (WELLBUTRIN XL) 150 MG 24 hr tablet Take 1 tablet (150 mg) by mouth every morning 30 tablet 1     cetirizine (ZYRTEC) 10 MG tablet Take 10 mg by mouth       cevimeline (EVOXAC) 30 MG capsule Take 1 capsule (30 mg) by mouth 3 times daily 120 capsule 11     citalopram (CELEXA) 40 MG tablet Take 1 tablet (40 mg) by mouth daily 90 tablet 1     dronabinol (MARINOL) 2.5 MG capsule Take 1 capsule (2.5 mg) by mouth 2 times daily (before meals) 28 capsule 3     HYDROcodone-acetaminophen (NORCO)  MG per tablet Take 1 tablet by mouth 4 times daily as needed for severe pain 120 tablet 0     levothyroxine (SYNTHROID/LEVOTHROID) 112 MCG tablet Take 1 tablet (112 mcg) by mouth daily 30 tablet 11     levothyroxine (SYNTHROID/LEVOTHROID) 75 MCG tablet Take 1 tablet (75 mcg) by mouth daily 30 tablet 11     metoprolol succinate ER (TOPROL-XL) 50 MG 24 hr tablet Take 1 tablet (50 mg) by mouth daily 90 tablet 2     nitroGLYcerin (NITROSTAT) 0.4 MG sublingual tablet For chest pain place 1 tablet under the tongue every 5 minutes for 3 doses. If symptoms persist 5 minutes after 1st dose call 911. 25 tablet 0     omeprazole (PRILOSEC) 40 MG DR capsule Take 1 capsule (40 mg) by mouth daily 90 capsule 3      pilocarpine (SALAGEN) 5 MG tablet Take 1 tablet (5 mg) by mouth 3 times daily 90 tablet 3     rosuvastatin (CRESTOR) 40 MG tablet Take 1 tablet (40 mg) by mouth daily 90 tablet 3     zolpidem ER (AMBIEN CR) 12.5 MG CR tablet TAKE ONE TABLET BY MOUTH NIGHTLY AS NEEDED FOR SLEEP 30 tablet 3       ALLERGIES:    Allergies   Allergen Reactions     Animal Dander      Azithromycin Nausea and Vomiting     Dust Mites      Pollen Extract      Smoke.        HABITS/SOCIAL HISTORY:   Nonsmoker. Chewing tobacco use occasionally when fishing. . Drink 2-3 beers per day.   Lives with wife (nurse).   Retired .     Social History     Socioeconomic History     Marital status:      Spouse name: Alessandra     Number of children: 2     Years of education: Not on file     Highest education level: Not on file   Occupational History     Occupation: Retired   Tobacco Use     Smoking status: Never Smoker     Smokeless tobacco: Never Used   Vaping Use     Vaping Use: Never used   Substance and Sexual Activity     Alcohol use: Yes     Types: 10 Cans of beer per week     Comment: Moderate     Drug use: No     Sexual activity: Yes     Partners: Female     Birth control/protection: Female Surgical   Other Topics Concern      Service Not Asked     Blood Transfusions Not Asked     Caffeine Concern Not Asked     Occupational Exposure Not Asked     Hobby Hazards Not Asked     Sleep Concern Not Asked     Stress Concern Not Asked     Weight Concern Not Asked     Special Diet Not Asked     Back Care Not Asked     Exercise Not Asked     Bike Helmet Not Asked     Seat Belt Not Asked     Self-Exams Not Asked     Parent/sibling w/ CABG, MI or angioplasty before 65F 55M? No   Social History Narrative     Not on file     Social Determinants of Health     Financial Resource Strain: Not on file   Food Insecurity: Not on file   Transportation Needs: Not on file   Physical Activity: Not on file   Stress: Not on file   Social  Connections: Not on file   Intimate Partner Violence: Not on file   Housing Stability: Not on file       PAST MEDICAL HISTORY:   Past Medical History:   Diagnosis Date     Allergic rhinitis      Allergy, unspecified not elsewhere classified     Seasonal allergies, pollen, dust, smoke and animals     Antiplatelet or antithrombotic long-term use      Anxiety      Arthritis      Chest pain      Chronic sinusitis      Coronary atherosclerosis of unspecified type of vessel, native or graft     Coronary artery disease     Depressive disorder 1995     Gastroesophageal reflux disease 2020    Cleared with medication     Head injury 1954     Hiatal hernia 2015    Right Side     History of blood transfusion 12/15/2004    Prostate Surgery - My own blood     Hyperlipidemia      Hypertension      Inguinal hernia      Kidney problem 10/08/2017    Lithotripsy     Kidney stones      Malignant neoplasm of prostate ([facility])     Prostate cancer     Prostate cancer ([facility])      Syncope         PAST SURGICAL HISTORY:   Past Surgical History:   Procedure Laterality Date     ARTHRODESIS FOOT  7/23/2013    Procedure: ARTHRODESIS FOOT;  Great Toe Arthrodesis Left Foot;  Surgeon: Ash Gonzalez DPM;  Location: [facility]     ARTHRODESIS FOOT  6/10/2014    Procedure: ARTHRODESIS FOOT;  Surgeon: Ash Gonzalez DPM;  Location: [facility]     BIOPSY  10/1/2004    dermatologist biopsies     COLONOSCOPY  10/7/2013    Procedure: COLONOSCOPY;  Colonoscopy;  Surgeon: Mike Fallon MD;  Location: [facility]     CYSTOSCOPY N/A 2/16/2022    Procedure: CYSTOSCOPY and bladder stone removal;  Surgeon: David Rogers MD;  Location: [facility]     ESOPHAGOSCOPY, GASTROSCOPY, DUODENOSCOPY (EGD), COMBINED N/A 2/12/2020    Procedure: ESOPHAGOGASTRODUODENOSCOPY (EGD);  Surgeon: Sam Escobar MD;  Location: [facility]     EXTRACORPOREAL SHOCK WAVE LITHOTRIPSY (ESWL) Bilateral 10/18/2017    Procedure: EXTRACORPOREAL SHOCK WAVE LITHOTRIPSY (ESWL);  BILATERAL EXTRACORPOREAL  SHOCKWAVE LITHOTRIPSY ;  Surgeon: Meir Torres MD;  Location: [facility] OR     HC CORRECT BUNION,SIMPLE  08/11/2005    x3     HC REMV TOE BENIGN BONE LESN  08/11/2005     HEAD & NECK SURGERY  1954    From a fall     HERNIORRHAPHY INGUINAL  7/3/2013    Procedure: HERNIORRHAPHY INGUINAL;  Open Repair Inguinal hernia Right with mesh ;  Surgeon: Sam Escobar MD;  Location: [facility]     IR GASTROSTOMY TUBE PERCUTANEOUS PLCMNT  6/7/2021     MOHS MICROGRAPHIC PROCEDURE  08/23/11    ear and chin-[facility]     OPEN REDUCTION INTERNAL FIXATION WRIST Right 7/18/2017    Procedure: OPEN REDUCTION INTERNAL FIXATION WRIST;  Right distal radius open reduction and internal fixation;  Surgeon: Pedro Blanca DO;  Location: [facility]     RECONSTRUCT FOREFOOT WITH METATARSOPHALANGEAL (MTP) FUSION  6/10/2014    Procedure: RECONSTRUCT FOREFOOT WITH METATARSOPHALANGEAL (MTP) FUSION;  Surgeon: Ash Gonzalez DPM;  Location: [facility]     STENT, CORONARY, DEMI       SURGICAL HISTORY OF -   1999/1974    lt knee     SURGICAL HISTORY OF -   10/2004    lithotripsy     SURGICAL HISTORY OF -   11/05    angiogram with stent     VASCULAR SURGERY  11/17/2005    Puncture of iliac artery during and angiogram     [facility] LAPAROSCOPY, SURGICAL PROSTATECTOMY, RETROPUBIC RADICAL, W/NERVE SPARING  11/30/2004    With full bilateral pelvic lymphadenectomy.  [facility].     [facility] TOTAL KNEE ARTHROPLASTY  05/01/08    Left knee       FAMILY HISTORY:    Family History   Problem Relation Age of Onset     Hypertension Father         Lived to age 87     Connective Tissue Disorder Mother         LUPUS     Heart Disease Mother         Valve replacement     Anxiety Disorder Mother         Lived to age 84     Dementia Mother         Nursing Home (lived to age 86)       REVIEW OF SYSTEMS:  12 point ROS was negative other than the symptoms noted above in the HPI.  Patient Supplied Answers to Review of Systems  [facility] ROS 8/13/2022   Constitutional: Problems  "with sleep   Neurology: -   Psychology: -   Ears, Nose, Throat: Nasal congestion or drainage   Musculoskeletal: Sore or stiff joints   Allergy/Immunology: Allergies or hay fever   Hematologic: -         PHYSICAL EXAMINATION:   Ht 1.727 m (5' 8\")   Wt 88 kg (194 lb)   BMI 29.50 kg/m     Patient in NAD  Breathing comfortably on room air      PROCEDURES:       RESULTS REVIEWED:   Care discussed at multidisciplinary tumor board today    Care discussed with SLP    Path report and CT neck report reviewed      IMPRESSION AND PLAN:   Quan Murphy is a 72 year old man with a 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 He completed definitive chemoradiation on 6/12/2021.    He had a biopsy of the left superior tonsil/soft palate on 8/19/2022 which was consistent with recurrent SCC. His case was discussed at tumor board today. We discussed recommendations for surgical resection. We discussed that this would entail a resection of the left soft palate and likely a tonsillectomy. We discussed that intraoperative margins would determine extent of resection.     We discussed side effects from the surgery. We spent time discussing the impacts on his swallowing and the plans for NG tube placement. We discussed if he has prolonged NG needs that it would be converted to a PEG. He was counseled on the importance of doing his swallowing exercises and close follow-up with the SLP team postop. He was asked to be diligent with his exercises starting before surgery. We discussed possible trismus and importance of doing trismus exercises. We discussed potential VPI and impacts on his speech. He met with SLP today and this information was reinforced as well.    Given his baseline chronic pain issues I anticipate pain will be an issue. I recommended referral back to the palliative care team, he prefers to work with his PCP for his pain control and will discuss this with him at his preop.    He will be admitted postoperatively. [facility] would [facility]    We did discuss the alternative of keytruda, discussed risk that if tumor were to grow on keytruda that could require bigger resection/have more impacts on swallowing.     He is on synthroid, decreased dose last visit due to low TSH, will recheck labs in 3 months.     We will get a PET scan. He says this is scheduled for next week.    We will work on finding him a surgical date. He will need a preop before surgery which he would like to do locally.    Thank you very much for the opportunity to participate in the care of your patient.      Ligia Fang MD  Otolaryngology- Head & Neck Surgery      This note was dictated with voice recognition software and then edited. Please excuse any unintentional errors.         Video start: 3:24 pm  VIdeo end: 3:49 pm       A total of 32 minutes was spent on patient visit and charting activities on date of service.      CC:  Ahmet Robbins MD  Department of Radiation Oncology  [facility]      Too Christensen MD  [address] [place] [address]      Shanthi Schrader MD  Department of Medical Oncology  [facility]

## 2022-08-26 ENCOUNTER — THERAPY VISIT (OUTPATIENT)
Dept: SPEECH THERAPY | Facility: CLINIC | Age: 72
End: 2022-08-26
Payer: MEDICARE

## 2022-08-26 ENCOUNTER — VIRTUAL VISIT (OUTPATIENT)
Dept: OTOLARYNGOLOGY | Facility: CLINIC | Age: 72
End: 2022-08-26
Payer: MEDICARE

## 2022-08-26 ENCOUNTER — TELEPHONE (OUTPATIENT)
Dept: FAMILY MEDICINE | Facility: CLINIC | Age: 72
End: 2022-08-26

## 2022-08-26 ENCOUNTER — PATIENT OUTREACH (OUTPATIENT)
Dept: OTOLARYNGOLOGY | Facility: CLINIC | Age: 72
End: 2022-08-26

## 2022-08-26 VITALS — WEIGHT: 194 LBS | BODY MASS INDEX: 29.4 KG/M2 | HEIGHT: 68 IN

## 2022-08-26 DIAGNOSIS — C09.9 SQUAMOUS CELL CARCINOMA OF TONSIL (H): Primary | ICD-10-CM

## 2022-08-26 DIAGNOSIS — C09.9 SQUAMOUS CELL CARCINOMA OF TONSIL (H): ICD-10-CM

## 2022-08-26 DIAGNOSIS — R13.12 OROPHARYNGEAL DYSPHAGIA: Primary | ICD-10-CM

## 2022-08-26 PROCEDURE — 99207 PR NO BILLABLE SERVICE THIS VISIT: CPT | Performed by: SPEECH-LANGUAGE PATHOLOGIST

## 2022-08-26 PROCEDURE — 99024 POSTOP FOLLOW-UP VISIT: CPT | Mod: VID | Performed by: OTOLARYNGOLOGY

## 2022-08-26 ASSESSMENT — PAIN SCALES - GENERAL: PAINLEVEL: MODERATE PAIN (5)

## 2022-08-26 NOTE — PROGRESS NOTES
Spoke with patient briefly via video in conjunction with ENT video visit follow up. Pt with hx of T2N2 p16+ SCC left tonsil s/p definitive chemoXRT finishing on 6/12/21. Unfortunately, he now presents with a recurrence in the left superior tonsil/soft palate. He will undergo surgical resection. Discussed expected post op dysphagia and need for NG. Educated pt on need for aggressive speech therapy for swallowing and trismus after surgery. Will plan to see pt in conjunction with ENT visits and then for additional therapy via video given pt's distance from the clinic. Educated pt on likelihood of some degree of VPI and trismus after surgery. Informed pt we will work hard to get his swallowing back to where it is now, but there is a chance his swallowing will never be how it is currently after surgery. Suggested pt resume swallow and trismus exercises at least 3-5x/day in anticipation of surgery. Pt requested additional handout. Additional handout sent via [employer] and paper copy in the mail. Encouraged pt to reach out should he have additional questions/concerns.     Full clinical swallow evaluation to come post op once medically cleared for PO.     JAJA Vaca MA (music), CCC-SLP   Speech Language Pathologist  [facility] Trained Vocologist   [facility]  Dept. of Otolaryngology  Department of Rehabilitation Services  [address] [place] [id number]  Email: [email or web address]   Phone: [phone]  Pronouns: she/her/hers

## 2022-08-26 NOTE — TELEPHONE ENCOUNTER
Patient calling to state he is facing having surgery on his palette in the next two weeks and requesting pre op physical as soon as possible. Patient stating he will be completing a PET scan on Thursday, 9/1, and then surgery will be scheduled as soon as possible. Pre op appointment is scheduled on Wednesday, August 31st. Alina Michael LPN

## 2022-08-26 NOTE — LETTER
8/26/2022       RE: Quan Murphy  [address]  [facility] [address]     Dear Colleague,    Thank you for referring your patient, Quan Murphy, to the [facility] [place] at [facility]. Please see a copy of my visit note below.    Dear Dr. Christensen:    I had the pleasure of seeing Quan Murphy in follow-up today at the [facility].     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 The patient has a history of foreign body sensation on the left side starting a few months ago. He tried gargling and using a neti pot with no improvement. He also developed left neck discomfort which radiates into his head. He saw primary care on 3/4/2021. He was treated with a course of antibiotics with no improvement. He saw seen by Dr Christensen on 3/22/2021. At that time he had an enlarged left tonsil. A biopsy was performed at that time which was consistent with a p16+ SCC. He had a CT scan on 3/23/2021 which showed a 2.9 x 2.7 x 3.5 cm left tonsil cancer, involving the soft palate, left level II and III lymphadenopathy. He had a PET scan performed today which showed the primary tumor in the tonsil and soft palate, left-sided lymphadenopathy with SHAYNE present (3.4 x 2.0 cm, 1.8 x 1.8 cm), right-sided node (1.4 x 0.9 cm) with FDG activity, no distant disease.    He was treated with definitive chemoradiation from 4/28/2021 to 6/12/2021. He had a total of 6996 cGy under the care of Dr Robbins. He had weekly cisplatin for a total of 5 cycles under the care of Dr Schrader. He had a difficult time with pain control during his treatment secondary to his chronic pain medication use and pain medications were managed by palliative care. He was hospitalized from 6/2/2021 to 6/9/2021 for failure to thrive. He had reactive PEG tube placement on 6/7/2021 due to  inability to maintain adequate nutrition and intolerance of the idea of an NG tube.  His PET scan was negative in September 2021. Dr Christensen performed a myringotomy in November 2021 for his ear issues.       Interval history:  He comes in today for follow-up. He was last seen in August 2022. He had a follow-up CT neck performed which showed a peripherally enhancing lesion measuring 1.4 x 1.5 x 1.5 cm with stable left level IIA node. The patient was noted to have a lesion of the left soft palate/superior tonsil which was biopsied. 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 His case was reviewed at tumor board today with recommendations for surgery. Keytruda was a potential option if he elected against surgery.     He is feeling very anxious about the diagnosis and plan.       His synthroid dosing was adjusted at his last visit.      MEDICATIONS:     Current Outpatient Medications   Medication Sig Dispense Refill     ALPRAZolam (XANAX) 0.5 MG tablet Take 1 tablet (0.5 mg) by mouth 4 times daily as needed for anxiety 120 tablet 0     buPROPion (WELLBUTRIN XL) 150 MG 24 hr tablet Take 1 tablet (150 mg) by mouth every morning 30 tablet 1     cetirizine (ZYRTEC) 10 MG tablet Take 10 mg by mouth       cevimeline (EVOXAC) 30 MG capsule Take 1 capsule (30 mg) by mouth 3 times daily 120 capsule 11     citalopram (CELEXA) 40 MG tablet Take 1 tablet (40 mg) by mouth daily 90 tablet 1     dronabinol (MARINOL) 2.5 MG capsule Take 1 capsule (2.5 mg) by mouth 2 times daily (before meals) 28 capsule 3     HYDROcodone-acetaminophen (NORCO)  MG per tablet Take 1 tablet by mouth 4 times daily as needed for severe pain 120 tablet 0     levothyroxine (SYNTHROID/LEVOTHROID) 112 MCG tablet Take 1 tablet (112 mcg) by mouth daily 30 tablet 11     levothyroxine (SYNTHROID/LEVOTHROID) 75 MCG tablet Take 1 tablet (75 mcg) by mouth daily 30 tablet 11     metoprolol succinate ER (TOPROL-XL) 50 MG 24 hr tablet Take 1 tablet (50 mg) by  mouth daily 90 tablet 2     nitroGLYcerin (NITROSTAT) 0.4 MG sublingual tablet For chest pain place 1 tablet under the tongue every 5 minutes for 3 doses. If symptoms persist 5 minutes after 1st dose call 911. 25 tablet 0     omeprazole (PRILOSEC) 40 MG DR capsule Take 1 capsule (40 mg) by mouth daily 90 capsule 3     pilocarpine (SALAGEN) 5 MG tablet Take 1 tablet (5 mg) by mouth 3 times daily 90 tablet 3     rosuvastatin (CRESTOR) 40 MG tablet Take 1 tablet (40 mg) by mouth daily 90 tablet 3     zolpidem ER (AMBIEN CR) 12.5 MG CR tablet TAKE ONE TABLET BY MOUTH NIGHTLY AS NEEDED FOR SLEEP 30 tablet 3       ALLERGIES:    Allergies   Allergen Reactions     Animal Dander      Azithromycin Nausea and Vomiting     Dust Mites      Pollen Extract      Smoke.        HABITS/SOCIAL HISTORY:   Nonsmoker. Chewing tobacco use occasionally when fishing. . Drink 2-3 beers per day.   Lives with wife (nurse).   Retired .     Social History     Socioeconomic History     Marital status:      Spouse name: Alessandra     Number of children: 2     Years of education: Not on file     Highest education level: Not on file   Occupational History     Occupation: Retired   Tobacco Use     Smoking status: Never Smoker     Smokeless tobacco: Never Used   Vaping Use     Vaping Use: Never used   Substance and Sexual Activity     Alcohol use: Yes     Types: 10 Cans of beer per week     Comment: Moderate     Drug use: No     Sexual activity: Yes     Partners: Female     Birth control/protection: Female Surgical   Other Topics Concern      Service Not Asked     Blood Transfusions Not Asked     Caffeine Concern Not Asked     Occupational Exposure Not Asked     Hobby Hazards Not Asked     Sleep Concern Not Asked     Stress Concern Not Asked     Weight Concern Not Asked     Special Diet Not Asked     Back Care Not Asked     Exercise Not Asked     Bike Helmet Not Asked     Seat Belt Not Asked     Self-Exams Not Asked      Parent/sibling w/ CABG, MI or angioplasty before 65F 55M? No   Social History Narrative     Not on file     Social Determinants of Health     Financial Resource Strain: Not on file   Food Insecurity: Not on file   Transportation Needs: Not on file   Physical Activity: Not on file   Stress: Not on file   Social Connections: Not on file   Intimate Partner Violence: Not on file   Housing Stability: Not on file       PAST MEDICAL HISTORY:   Past Medical History:   Diagnosis Date     Allergic rhinitis      Allergy, unspecified not elsewhere classified     Seasonal allergies, pollen, dust, smoke and animals     Antiplatelet or antithrombotic long-term use      Anxiety      Arthritis      Chest pain      Chronic sinusitis      Coronary atherosclerosis of unspecified type of vessel, native or graft     Coronary artery disease     Depressive disorder 1995     Gastroesophageal reflux disease 2020    Cleared with medication     Head injury 1954     Hiatal hernia 2015    Right Side     History of blood transfusion 12/15/2004    Prostate Surgery - My own blood     Hyperlipidemia      Hypertension      Inguinal hernia      Kidney problem 10/08/2017    Lithotripsy     Kidney stones      Malignant neoplasm of prostate ([facility])     Prostate cancer     Prostate cancer (H)      Syncope         PAST SURGICAL HISTORY:   Past Surgical History:   Procedure Laterality Date     ARTHRODESIS FOOT  7/23/2013    Procedure: ARTHRODESIS FOOT;  Great Toe Arthrodesis Left Foot;  Surgeon: Ash Gonzalez DPM;  Location: [facility] OR     ARTHRODESIS FOOT  6/10/2014    Procedure: ARTHRODESIS FOOT;  Surgeon: Ash Gonzalez DPM;  Location: [facility]     BIOPSY  10/1/2004    dermatologist biopsies     COLONOSCOPY  10/7/2013    Procedure: COLONOSCOPY;  Colonoscopy;  Surgeon: Mike Fallon MD;  Location:  GI     CYSTOSCOPY N/A 2/16/2022    Procedure: CYSTOSCOPY and bladder stone removal;  Surgeon: David Rogers MD;  Location:  OR      ESOPHAGOSCOPY, GASTROSCOPY, DUODENOSCOPY (EGD), COMBINED N/A 2/12/2020    Procedure: ESOPHAGOGASTRODUODENOSCOPY (EGD);  Surgeon: Sam Escobar MD;  Location: [facility]     EXTRACORPOREAL SHOCK WAVE LITHOTRIPSY (ESWL) Bilateral 10/18/2017    Procedure: EXTRACORPOREAL SHOCK WAVE LITHOTRIPSY (ESWL);  BILATERAL EXTRACORPOREAL SHOCKWAVE LITHOTRIPSY ;  Surgeon: Meir Torres MD;  Location: [facility] OR     HC CORRECT BUNION,SIMPLE  08/11/2005    x3     HC REMV TOE BENIGN BONE LESN  08/11/2005     HEAD & NECK SURGERY  1954    From a fall     HERNIORRHAPHY INGUINAL  7/3/2013    Procedure: HERNIORRHAPHY INGUINAL;  Open Repair Inguinal hernia Right with mesh ;  Surgeon: Sam Escobar MD;  Location: [facility] OR     IR GASTROSTOMY TUBE PERCUTANEOUS PLCMNT  6/7/2021     MOHS MICROGRAPHIC PROCEDURE  08/23/11    ear and chin-[facility]     OPEN REDUCTION INTERNAL FIXATION WRIST Right 7/18/2017    Procedure: OPEN REDUCTION INTERNAL FIXATION WRIST;  Right distal radius open reduction and internal fixation;  Surgeon: Pedro Blanca DO;  Location: [facility] OR     RECONSTRUCT FOREFOOT WITH METATARSOPHALANGEAL (MTP) FUSION  6/10/2014    Procedure: RECONSTRUCT FOREFOOT WITH METATARSOPHALANGEAL (MTP) FUSION;  Surgeon: Ash Gonzalez DPM;  Location: [facility] OR     STENT, CORONARY, DEMI       SURGICAL HISTORY OF -   1999/1974    lt knee     SURGICAL HISTORY OF -   10/2004    lithotripsy     SURGICAL HISTORY OF -   11/05    angiogram with stent     VASCULAR SURGERY  11/17/2005    Puncture of iliac artery during and angiogram     [facility] LAPAROSCOPY, SURGICAL PROSTATECTOMY, RETROPUBIC RADICAL, W/NERVE SPARING  11/30/2004    With full bilateral pelvic lymphadenectomy.  [facility].     [facility] TOTAL KNEE ARTHROPLASTY  05/01/08    Left knee       FAMILY HISTORY:    Family History   Problem Relation Age of Onset     Hypertension Father         Lived to age 87     Connective Tissue Disorder Mother         LUPUS     Heart Disease Mother          "Valve replacement     Anxiety Disorder Mother         Lived to age 84     Dementia Mother         Nursing Home (lived to age 86)       REVIEW OF SYSTEMS:  12 point ROS was negative other than the symptoms noted above in the HPI.  Patient Supplied Answers to Review of Systems  [facility] ROS 8/13/2022   Constitutional: Problems with sleep   Neurology: -   Psychology: -   Ears, Nose, Throat: Nasal congestion or drainage   Musculoskeletal: Sore or stiff joints   Allergy/Immunology: Allergies or hay fever   Hematologic: -         PHYSICAL EXAMINATION:   Ht 1.727 m (5' 8\")   Wt 88 kg (194 lb)   BMI 29.50 kg/m     Patient in NAD  Breathing comfortably on room air      PROCEDURES:       RESULTS REVIEWED:   Care discussed at multidisciplinary tumor board today    Care discussed with SLP    Path report and CT neck report reviewed      IMPRESSION AND PLAN:   Quan Murphy is a 72 year old man with a 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 He completed definitive chemoradiation on 6/12/2021.    He had a biopsy of the left superior tonsil/soft palate on 8/19/2022 which was consistent with recurrent SCC. His case was discussed at tumor board today. We discussed recommendations for surgical resection. We discussed that this would entail a resection of the left soft palate and likely a tonsillectomy. We discussed that intraoperative margins would determine extent of resection.     We discussed side effects from the surgery. We spent time discussing the impacts on his swallowing and the plans for NG tube placement. We discussed if he has prolonged NG needs that it would be converted to a PEG. He was counseled on the importance of doing his swallowing exercises and close follow-up with the SLP team postop. He was asked to be diligent with his exercises starting before surgery. We discussed possible trismus and importance of doing trismus exercises. We discussed potential VPI and impacts on his speech. He met with SLP today and this " information was reinforced as well.    Given his baseline chronic pain issues I anticipate pain will be an issue. I recommended referral back to the palliative care team, he prefers to work with his PCP for his pain control and will discuss this with him at his preop.    He will be admitted postoperatively. His wife would like to stay at [facility] during his hospitalization.    We did discuss the alternative of keytruda, discussed risk that if tumor were to grow on keytruda that could require bigger resection/have more impacts on swallowing.     He is on synthroid, decreased dose last visit due to low TSH, will recheck labs in 3 months.     We will get a PET scan. He says this is scheduled for next week.    We will work on finding him a surgical date. He will need a preop before surgery which he would like to do locally.    Thank you very much for the opportunity to participate in the care of your patient.      Ligia Fang MD  Otolaryngology- Head & Neck Surgery      This note was dictated with voice recognition software and then edited. Please excuse any unintentional errors.       Video start: 3:24 pm  VIdeo end: 3:49 pm       A total of 32 minutes was spent on patient visit and charting activities on date of service.      CC:  Ahmet Robbins MD  Department of Radiation Oncology  [facility]      Too Christensen MD  [address] [place] [address]      Shanthi Schrader MD  Department of Medical Oncology  [facility]

## 2022-08-27 NOTE — CONFIDENTIAL NOTE
Called and spoke with patient as we could arrange PET scan earlier on 8/30 instead. Patient agreeable. PET scan rescheduled. Reviewed location and prep with patient. He verbalized understanding.       Chela Mariano, RN, BSN

## 2022-08-29 ENCOUNTER — TELEPHONE (OUTPATIENT)
Dept: OTOLARYNGOLOGY | Facility: CLINIC | Age: 72
End: 2022-08-29

## 2022-08-29 NOTE — TELEPHONE ENCOUNTER
Called patient to schedule surgery with Dr. Fang    Date of Surgery: 9/6    Location of surgery: [facility]    Pre-Op H&P: PCP    Pre/Post Imaging:  Not Applicable    Discussed COVID-19 Testing: Yes    Post-Op Appt Date: 2 weeks    Surgery Packet Mailed: [facility] - too soon to be mailed      Additional comments: MAX Lopez on 8/29/2022 at 8:45 AM

## 2022-08-30 ENCOUNTER — HOSPITAL ENCOUNTER (OUTPATIENT)
Dept: PET IMAGING | Facility: CLINIC | Age: 72
Discharge: HOME | End: 2022-08-30
Attending: RADIOLOGY
Payer: MEDICARE

## 2022-08-30 ENCOUNTER — HOSPITAL ENCOUNTER (OUTPATIENT)
Dept: PET IMAGING | Facility: CLINIC | Age: 72
Discharge: HOME | End: 2022-08-30
Attending: OTOLARYNGOLOGY
Payer: MEDICARE

## 2022-08-30 DIAGNOSIS — C09.9 SQUAMOUS CELL CARCINOMA OF TONSIL (H): ICD-10-CM

## 2022-08-30 PROCEDURE — 74177 CT ABD & PELVIS W/CONTRAST: CPT

## 2022-08-30 PROCEDURE — 70491 CT SOFT TISSUE NECK W/DYE: CPT | Mod: 26 | Performed by: RADIOLOGY

## 2022-08-30 PROCEDURE — G1010 CDSM STANSON: HCPCS | Mod: PS

## 2022-08-30 PROCEDURE — G1010 CDSM STANSON: HCPCS | Mod: GC | Performed by: STUDENT IN AN ORGANIZED HEALTH CARE EDUCATION/TRAINING PROGRAM

## 2022-08-30 PROCEDURE — 70491 CT SOFT TISSUE NECK W/DYE: CPT

## 2022-08-30 PROCEDURE — 78816 PET IMAGE W/CT FULL BODY: CPT | Mod: 26 | Performed by: STUDENT IN AN ORGANIZED HEALTH CARE EDUCATION/TRAINING PROGRAM

## 2022-08-30 PROCEDURE — 74177 CT ABD & PELVIS W/CONTRAST: CPT | Mod: 26 | Performed by: STUDENT IN AN ORGANIZED HEALTH CARE EDUCATION/TRAINING PROGRAM

## 2022-08-30 PROCEDURE — A9552 F18 FDG: HCPCS | Performed by: RADIOLOGY

## 2022-08-30 PROCEDURE — 343N000001 HC RX 343: Performed by: RADIOLOGY

## 2022-08-30 PROCEDURE — 71260 CT THORAX DX C+: CPT | Mod: 26 | Performed by: STUDENT IN AN ORGANIZED HEALTH CARE EDUCATION/TRAINING PROGRAM

## 2022-08-30 PROCEDURE — 250N000011 HC RX IP 250 OP 636: Performed by: RADIOLOGY

## 2022-08-30 RX ORDER — IOPAMIDOL 755 MG/ML
45-150 INJECTION, SOLUTION INTRAVASCULAR ONCE
Status: COMPLETED | OUTPATIENT
Start: 2022-08-30 | End: 2022-08-30

## 2022-08-30 RX ADMIN — FLUDEOXYGLUCOSE F-18 11.65 MCI.: 500 INJECTION, SOLUTION INTRAVENOUS at 11:52

## 2022-08-30 RX ADMIN — IOPAMIDOL 110 ML: 755 INJECTION, SOLUTION INTRAVENOUS at 11:56

## 2022-08-31 ENCOUNTER — OFFICE VISIT (OUTPATIENT)
Dept: FAMILY MEDICINE | Facility: CLINIC | Age: 72
End: 2022-08-31
Payer: MEDICARE

## 2022-08-31 VITALS
RESPIRATION RATE: 20 BRPM | WEIGHT: 191 LBS | HEART RATE: 70 BPM | BODY MASS INDEX: 29.04 KG/M2 | DIASTOLIC BLOOD PRESSURE: 76 MMHG | SYSTOLIC BLOOD PRESSURE: 106 MMHG | OXYGEN SATURATION: 98 % | TEMPERATURE: 97 F

## 2022-08-31 DIAGNOSIS — Z01.818 PREOP GENERAL PHYSICAL EXAM: Primary | ICD-10-CM

## 2022-08-31 DIAGNOSIS — C09.9 SQUAMOUS CELL CARCINOMA OF LEFT TONSIL (H): ICD-10-CM

## 2022-08-31 DIAGNOSIS — G89.3 NEOPLASM RELATED PAIN: ICD-10-CM

## 2022-08-31 PROCEDURE — 99214 OFFICE O/P EST MOD 30 MIN: CPT | Performed by: FAMILY MEDICINE

## 2022-08-31 PROCEDURE — 93000 ELECTROCARDIOGRAM COMPLETE: CPT | Performed by: FAMILY MEDICINE

## 2022-08-31 RX ORDER — HYDROCODONE BITARTRATE AND ACETAMINOPHEN 10; 325 MG/1; MG/1
1 TABLET ORAL 4 TIMES DAILY PRN
Qty: 120 TABLET | Refills: 0 | Status: SHIPPED | OUTPATIENT
Start: 2022-08-31 | End: 2022-09-26

## 2022-08-31 ASSESSMENT — ANXIETY QUESTIONNAIRES
IF YOU CHECKED OFF ANY PROBLEMS ON THIS QUESTIONNAIRE, HOW DIFFICULT HAVE THESE PROBLEMS MADE IT FOR YOU TO DO YOUR WORK, TAKE CARE OF THINGS AT HOME, OR GET ALONG WITH OTHER PEOPLE: SOMEWHAT DIFFICULT
GAD7 TOTAL SCORE: 6
4. TROUBLE RELAXING: SEVERAL DAYS
GAD7 TOTAL SCORE: 6
7. FEELING AFRAID AS IF SOMETHING AWFUL MIGHT HAPPEN: SEVERAL DAYS
5. BEING SO RESTLESS THAT IT IS HARD TO SIT STILL: NOT AT ALL
1. FEELING NERVOUS, ANXIOUS, OR ON EDGE: SEVERAL DAYS
6. BECOMING EASILY ANNOYED OR IRRITABLE: SEVERAL DAYS
3. WORRYING TOO MUCH ABOUT DIFFERENT THINGS: SEVERAL DAYS
7. FEELING AFRAID AS IF SOMETHING AWFUL MIGHT HAPPEN: SEVERAL DAYS
8. IF YOU CHECKED OFF ANY PROBLEMS, HOW DIFFICULT HAVE THESE MADE IT FOR YOU TO DO YOUR WORK, TAKE CARE OF THINGS AT HOME, OR GET ALONG WITH OTHER PEOPLE?: SOMEWHAT DIFFICULT
2. NOT BEING ABLE TO STOP OR CONTROL WORRYING: SEVERAL DAYS

## 2022-08-31 NOTE — PROGRESS NOTES
[address] [address]  Phone: [phone]  Fax: [phone]  Primary Provider: Mesha Hernandez  Pre-op Performing Provider: MESHA HERNANDEZ      PREOPERATIVE EVALUATION:  Today's date: 8/31/2022    Quan Murphy is a 72 year old male who presents for a preoperative evaluation.    Surgical Information:  Surgery/Procedure: Left radical tonsillectomy and nasogastric tube placement  Surgery Location: [facility]  Surgeon: Dr. Fang  Surgery Date: 9/6/22  Time of Surgery: 12:05 pm  Where patient plans to recover: At home with family  Fax number for surgical facility: Note does not need to be faxed, will be available electronically in Epic.    Type of Anesthesia Anticipated: General    Assessment & Plan     The proposed surgical procedure is considered INTERMEDIATE risk.    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  He has developed some tolerance for opioids over the years.  - HYDROcodone-acetaminophen (NORCO)  MG per tablet; Take 1 tablet by mouth 4 times daily as needed for severe pain                 RECOMMENDATION:  APPROVAL GIVEN to proceed with proposed procedure, without further diagnostic evaluation.                      Subjective     HPI related to upcoming procedure: Previous left tonsillar cancer treated with resection and radiation and chemotherapy now on return visit appears to have returned.  Planning further surgical excision.    Preop Questions 8/29/2022   1. Have you ever had a heart attack or stroke? No   2. Have you ever had surgery on your heart or blood vessels, such as a stent placement, a coronary artery bypass, or surgery on an artery in your head, neck, heart, or legs? YES -    3. Do you have  chest pain with activity? No   4. Do you have a history of  heart failure? No   5. Do you currently have a cold, bronchitis or symptoms of other infection? No   6. Do you have a cough, shortness of breath, or wheezing? No   7. Do you or anyone in your family have previous history of blood clots? No   8. Do you or does anyone in your family have a serious bleeding problem such as prolonged bleeding following surgeries or cuts? No   9. Have you ever had problems with anemia or been told to take iron pills? YES -    10. Have you had any abnormal blood loss such as black, tarry or bloody stools? No   11. Have you ever had a blood transfusion? YES -    11a. Have you ever had a transfusion reaction? No   12. Are you willing to have a blood transfusion if it is medically needed before, during, or after your surgery? Yes   13. Have you or any of your relatives ever had problems with anesthesia? No   14. Do you have sleep apnea, excessive snoring or daytime drowsiness? No   15. Do you have any artifical heart valves or other implanted medical devices like a pacemaker, defibrillator, or continuous glucose monitor? No   16. Do you have artificial joints? YES - Total Knee   17. Are you allergic to latex? No       Health Care Directive:  Patient has a Health Care Directive on file      Preoperative Review of :   reviewed - controlled substances reflected in medication list.          Review of Systems  Constitutional, neuro, ENT, endocrine, pulmonary, cardiac, gastrointestinal, genitourinary, musculoskeletal, integument and psychiatric systems are negative, except as otherwise noted.    Patient Active Problem List    Diagnosis Date Noted     Pancytopenia (H) 08/16/2022     Priority: Medium     Severe protein-calorie malnutrition (H) 08/16/2022     Priority: Medium     Chronic, continuous use of opioids 04/21/2022     Priority: Medium     Chronic kidney disease, stage 3 (H) 06/15/2021     Priority: Medium     Failure to  thrive (0-17) 06/02/2021     Priority: Medium     Replacing diagnoses that were inactivated after the 10/1/2021 regulatory import.       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     Priority: Medium     Hypomagnesemia 04/13/2021     Priority: Medium     Hiatal hernia 02/17/2020     Priority: Medium     Renal hematoma 10/28/2017     Priority: Medium     Retroperitoneal hematoma 10/28/2017     Priority: Medium     Syncope 10/18/2017     Priority: Medium     S/P ORIF (open reduction internal fixation) fracture 08/03/2017     Priority: Medium     Closed Colles' fracture of right radius with routine healing, subsequent encounter 08/03/2017     Priority: Medium     Status post insertion of drug-eluting stent into left anterior descending artery for coronary artery disease 01/05/2017     Priority: Medium     Chest pain 12/24/2016     Priority: Medium     Arthropathy 02/04/2014     Priority: Medium     Problem list name updated by automated process. Provider to review       Coronary atherosclerosis      Priority: Medium     Coronary artery disease  Problem list name updated by automated process. Provider to review       Hallux rigidus 07/16/2013     Priority: Medium     Inguinal hernia 06/28/2013     Priority: Medium     Degenerative arthritis of foot 06/28/2013     Priority: Medium     Advanced directives, counseling/discussion 05/24/2013     Priority: Medium     Advance Care Planning:   Receipt of ACP document:  Received: Health Care Directive which was witnessed or notarized on 8-18-08.  Document previously scanned on 7-8-13.  Validation form completed and sent to be scanned.  Code Status needs to be updated to reflect choices in most recent ACP document. Confirmed/documented designated decision maker(s). See permanent comments section of demographics in clinical tab. View document(s) and details by clicking on code status.   Added by Vandana Platt RN, System ACP Coordinator on 7/22/2013.    Advance Care  Planning:   ACP Review and Resources Provided:  Reviewed chart for advance care plan.  Quan Murphy has no plan or code status on file however states presence of ACP document. Copy requested. Confirmed code status reflects current choices pending receipt of document/advance care plan review. Confirmed/documented designated decision maker(s). See permanent comments section of demographics in clinical tab.   Added by Krysten Jasmine on 5/24/2013             Hypertension goal BP (blood pressure) < 140/90 06/12/2012     Priority: Medium     Hyperlipidemia LDL goal <130 12/22/2011     Priority: Medium     Anxiety 11/02/2011     Priority: Medium     Allergic conjunctivitis 07/08/2008     Priority: Medium     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, insomnia type 11/17/2006     Priority: Medium     Problem list name updated by automated process. Provider to review       Acute reaction to stress 01/12/2005     Priority: Medium     Problem list name updated by automated process. Provider to review       Chronic maxillary sinusitis 01/12/2005     Priority: Medium     Contracture of palmar fascia 01/12/2005     Priority: Medium     Benign neoplasm of skin of other and unspecified parts of face 01/12/2005     Priority: Medium     Malignant neoplasm of prostate (H) 11/26/2004     Priority: Medium      Past Medical History:   Diagnosis Date     Allergic rhinitis      Allergy, unspecified not elsewhere classified     Seasonal allergies, pollen, dust, smoke and animals     Antiplatelet or antithrombotic long-term use      Anxiety      Arthritis      Chest pain      Chronic sinusitis      Coronary atherosclerosis of unspecified type of vessel, native or graft     Coronary artery disease     Depressive disorder 1995     Gastroesophageal reflux disease 2020    Cleared with medication     Head injury 1954     Hiatal hernia 2015    Right Side     History of blood transfusion 12/15/2004    Prostate Surgery - My own blood      Hyperlipidemia      Hypertension      Inguinal hernia      Kidney problem 10/08/2017    Lithotripsy     Kidney stones      Malignant neoplasm of prostate (H)     Prostate cancer     Prostate cancer (H)      Syncope      Past Surgical History:   Procedure Laterality Date     ARTHRODESIS FOOT  7/23/2013    Procedure: ARTHRODESIS FOOT;  Great Toe Arthrodesis Left Foot;  Surgeon: Ash Gonzalez DPM;  Location: [facility]     ARTHRODESIS FOOT  6/10/2014    Procedure: ARTHRODESIS FOOT;  Surgeon: Ash Gonzalez DPM;  Location: [facility]     BIOPSY  10/1/2004    dermatologist biopsies     COLONOSCOPY  10/7/2013    Procedure: COLONOSCOPY;  Colonoscopy;  Surgeon: Mike Fallon MD;  Location: [facility]     CYSTOSCOPY N/A 2/16/2022    Procedure: CYSTOSCOPY and bladder stone removal;  Surgeon: David Rogers MD;  Location: [facility]     ESOPHAGOSCOPY, GASTROSCOPY, DUODENOSCOPY (EGD), COMBINED N/A 2/12/2020    Procedure: ESOPHAGOGASTRODUODENOSCOPY (EGD);  Surgeon: Sam Escobar MD;  Location: [facility]     EXTRACORPOREAL SHOCK WAVE LITHOTRIPSY (ESWL) Bilateral 10/18/2017    Procedure: EXTRACORPOREAL SHOCK WAVE LITHOTRIPSY (ESWL);  BILATERAL EXTRACORPOREAL SHOCKWAVE LITHOTRIPSY ;  Surgeon: Meir Torres MD;  Location: [facility]     HC CORRECT BUNION,SIMPLE  08/11/2005    x3     HC REMV TOE BENIGN BONE LESN  08/11/2005     HEAD & NECK SURGERY  1954    From a fall     HERNIORRHAPHY INGUINAL  7/3/2013    Procedure: HERNIORRHAPHY INGUINAL;  Open Repair Inguinal hernia Right with mesh ;  Surgeon: Sam Escobar MD;  Location: [facility]     IR GASTROSTOMY TUBE PERCUTANEOUS PLCMNT  6/7/2021     MOHS MICROGRAPHIC PROCEDURE  08/23/11    ear and chin-[facility]     OPEN REDUCTION INTERNAL FIXATION WRIST Right 7/18/2017    Procedure: OPEN REDUCTION INTERNAL FIXATION WRIST;  Right distal radius open reduction and internal fixation;  Surgeon: Pedro Blanca DO;  Location: [facility]     RECONSTRUCT FOREFOOT WITH  METATARSOPHALANGEAL (MTP) FUSION  6/10/2014    Procedure: RECONSTRUCT FOREFOOT WITH METATARSOPHALANGEAL (MTP) FUSION;  Surgeon: Ash Gonzalez DPM;  Location: [facility]     STENT, CORONARY, DEMI       SURGICAL HISTORY OF -   1999/1974    lt knee     SURGICAL HISTORY OF -   10/2004    lithotripsy     SURGICAL HISTORY OF -   11/05    angiogram with stent     VASCULAR SURGERY  11/17/2005    Puncture of iliac artery during and angiogram     [facility] LAPAROSCOPY, SURGICAL PROSTATECTOMY, RETROPUBIC RADICAL, W/NERVE SPARING  11/30/2004    With full bilateral pelvic lymphadenectomy.  -[facility].     [facility] TOTAL KNEE ARTHROPLASTY  05/01/08    Left knee     Current Outpatient Medications   Medication Sig Dispense Refill     ALPRAZolam (XANAX) 0.5 MG tablet Take 1 tablet (0.5 mg) by mouth 4 times daily as needed for anxiety 120 tablet 0     buPROPion (WELLBUTRIN XL) 150 MG 24 hr tablet Take 1 tablet (150 mg) by mouth every morning 30 tablet 1     cetirizine (ZYRTEC) 10 MG tablet Take 10 mg by mouth       cevimeline (EVOXAC) 30 MG capsule Take 1 capsule (30 mg) by mouth 3 times daily 120 capsule 11     citalopram (CELEXA) 40 MG tablet Take 1 tablet (40 mg) by mouth daily 90 tablet 1     dronabinol (MARINOL) 2.5 MG capsule Take 1 capsule (2.5 mg) by mouth 2 times daily (before meals) 28 capsule 3     HYDROcodone-acetaminophen (NORCO)  MG per tablet Take 1 tablet by mouth 4 times daily as needed for severe pain 120 tablet 0     levothyroxine (SYNTHROID/LEVOTHROID) 112 MCG tablet Take 1 tablet (112 mcg) by mouth daily 30 tablet 11     levothyroxine (SYNTHROID/LEVOTHROID) 75 MCG tablet Take 1 tablet (75 mcg) by mouth daily 30 tablet 11     metoprolol succinate ER (TOPROL-XL) 50 MG 24 hr tablet Take 1 tablet (50 mg) by mouth daily 90 tablet 2     omeprazole (PRILOSEC) 40 MG DR capsule Take 1 capsule (40 mg) by mouth daily 90 capsule 3     pilocarpine (SALAGEN) 5 MG tablet Take 1 tablet (5 mg) by mouth 3 times daily 90 tablet 3      rosuvastatin (CRESTOR) 40 MG tablet Take 1 tablet (40 mg) by mouth daily 90 tablet 3     zolpidem ER (AMBIEN CR) 12.5 MG CR tablet TAKE ONE TABLET BY MOUTH NIGHTLY AS NEEDED FOR SLEEP 30 tablet 3     nitroGLYcerin (NITROSTAT) 0.4 MG sublingual tablet For chest pain place 1 tablet under the tongue every 5 minutes for 3 doses. If symptoms persist 5 minutes after 1st dose call 911. (Patient not taking: Reported on 8/31/2022) 25 tablet 0       Allergies   Allergen Reactions     Animal Dander      Azithromycin Nausea and Vomiting     Dust Mites      Pollen Extract      Smoke.         Social History     Tobacco Use     Smoking status: Never Smoker     Smokeless tobacco: Never Used   Substance Use Topics     Alcohol use: Yes     Types: 10 Cans of beer per week     Comment: Moderate     Family History   Problem Relation Age of Onset     Hypertension Father         Lived to age 87     Connective Tissue Disorder Mother         LUPUS     Heart Disease Mother         Valve replacement     Anxiety Disorder Mother         Lived to age 84     Dementia Mother         Nursing Home (lived to age 86)     History   Drug Use No         Objective     /76   Pulse 70   Temp 97  F (36.1  C) (Temporal)   Resp 20   Wt 86.6 kg (191 lb)   SpO2 98%   BMI 29.04 kg/m      Physical Exam    GENERAL APPEARANCE: healthy, alert and no distress     EYES: EOMI,  PERRL     HENT: ear canals and TM's normal and left tonsillar abnormality     NECK: no adenopathy, no asymmetry, masses, or scars and thyroid normal to palpation     RESP: lungs clear to auscultation - no rales, rhonchi or wheezes     CV: regular rates and rhythm, normal S1 S2, no S3 or S4 and no murmur, click or rub     ABDOMEN:  soft, nontender, no HSM or masses and bowel sounds normal     MS: extremities normal- no gross deformities noted, no evidence of inflammation in joints, FROM in all extremities.     SKIN: no suspicious lesions or rashes     NEURO: Normal strength and tone,  sensory exam grossly normal, mentation intact and speech normal     PSYCH: affect flat and anxious     LYMPHATICS: No cervical adenopathy    Recent Labs   Lab Test 08/19/22  1247 05/13/22  1429 02/26/22  1211 11/15/21  1107 11/15/21  1106 06/07/21  2052 06/07/21  0953   HGB  --   --  12.5* 15.3  --    < >  --    PLT  --   --  163 148*  --    < >  --    INR  --   --   --   --   --   --  1.49*   NA  --   --  138  --  136   < >  --    POTASSIUM  --   --  4.0  --  4.3   < >  --    CR 1.4* 1.6* 1.04  --  1.18   < >  --     < > = values in this interval not displayed.        Diagnostics:  No labs were ordered during this visit.   EKG: appears normal, NSR, normal axis, normal intervals, no acute ST/T changes c/w ischemia, no LVH by voltage criteria    Revised Cardiac Risk Index (RCRI):  The patient has the following serious cardiovascular risks for perioperative complications:   - Coronary Artery Disease (MI, positive stress test, angina, Qs on EKG) = 1 point     RCRI Interpretation: 1 point: Class II (low risk - 0.9% complication rate)           Signed Electronically by: Jose Hernandez MD  Copy of this evaluation report is provided to requesting physician.

## 2022-08-31 NOTE — PATIENT INSTRUCTIONS
Preparing for Your Surgery  Getting started  A nurse will call you to review your health history and instructions. They will give you an arrival time based on your scheduled surgery time. Please be ready to share:    Your doctor's clinic name and phone number    Your medical, surgical and anesthesia history    A list of allergies and sensitivities    A list of medicines, including herbal treatments and over-the-counter drugs    Whether the patient has a legal guardian (ask how to send us the papers in advance)  Please tell us if you're pregnant--or if there's any chance you might be pregnant. Some surgeries may injure a fetus (unborn baby), so they require a pregnancy test. Surgeries that are safe for a fetus don't always need a test, and you can choose whether to have one.   If you have a child who's having surgery, please ask for a copy of Preparing for Your Child's Surgery.    Preparing for surgery    Within 30 days of surgery: Have a pre-op exam (sometimes called an H&P, or History and Physical). This can be done at a clinic or pre-operative center.  ? If you're having a , you may not need this exam. Talk to your care team.    At your pre-op exam, talk to your care team about all medicines you take. If you need to stop any medicines before surgery, ask when to start taking them again.  ? We do this for your safety. Many medicines can make you bleed too much during surgery. Some change how well surgery (anesthesia) drugs work.    Call your insurance company to let them know you're having surgery. (If you don't have insurance, call [phone].)    Call your clinic if there's any change in your health. This includes signs of a cold or flu (sore throat, runny nose, cough, rash, fever). It also includes a scrape or scratch near the surgery site.    If you have questions on the day of surgery, call your hospital or surgery center.  COVID testing  You may need to be tested for COVID-19 before having  surgery. If so, we will give you instructions.  Eating and drinking guidelines  For your safety: Unless your surgeon tells you otherwise, follow the guidelines below.    Eat and drink as usual until 8 hours before surgery. After that, no food or milk.    Drink clear liquids until 2 hours before surgery. These are liquids you can see through, like water, Gatorade and Propel Water. You may also have black coffee and tea (no cream or milk).    Nothing by mouth within 2 hours of surgery. This includes gum, candy and breath mints.    If you drink alcohol: Stop drinking it the night before surgery.    If your care team tells you to take medicine on the morning of surgery, it's okay to take it with a sip of water.  Preventing infection    Shower or bathe the night before and morning of your surgery. Follow the instructions your clinic gave you. (If no instructions, use regular soap.)    Don't shave or clip hair near your surgery site. We'll remove the hair if needed.    Don't smoke or vape the morning of surgery. You may chew nicotine gum up to 2 hours before surgery. A nicotine patch is okay.  ? Note: Some surgeries require you to completely quit smoking and nicotine. Check with your surgeon.    Your care team will make every effort to keep you safe from infection. We will:  ? Clean our hands often with soap and water (or an alcohol-based hand rub).  ? Clean the skin at your surgery site with a special soap that kills germs.  ? Give you a special gown to keep you warm. (Cold raises the risk of infection.)  ? Wear special hair covers, masks, gowns and gloves during surgery.  ? Give antibiotic medicine, if prescribed. Not all surgeries need antibiotics.  What to bring on the day of surgery    Photo ID and insurance card    Copy of your health care directive, if you have one    Glasses and hearing aides (bring cases)  ? You can't wear contacts during surgery    Inhaler and eye drops, if you use them (tell us about these when  you arrive)    CPAP machine or breathing device, if you use them    A few personal items, if spending the night    If you have . . .  ? A pacemaker, ICD (cardiac defibrillator) or other implant: Bring the ID card.  ? An implanted stimulator: Bring the remote control.  ? A legal guardian: Bring a copy of the certified (court-stamped) guardianship papers.  Please remove any jewelry, including body piercings. Leave jewelry and other valuables at home.  If you're going home the day of surgery    You must have a responsible adult drive you home. They should stay with you overnight as well.    If you don't have someone to stay with you, and you aren't safe to go home alone, we may keep you overnight. Insurance often won't pay for this.  After surgery  If it's hard to control your pain or you need more pain medicine, please call your surgeon's office.  Questions?   If you have any questions for your care team, list them here: _________________________________________________________________________________________________________________________________________________________________________ ____________________________________ ____________________________________ ____________________________________  For informational purposes only. Not to replace the advice of your health care provider. Copyright   2003, 2019 [facility]. All rights reserved. Clinically reviewed by Melissa Tovar MD. NOWBOX [id number] - REV 07/21.

## 2022-09-01 ENCOUNTER — TELEPHONE (OUTPATIENT)
Dept: FAMILY MEDICINE | Facility: CLINIC | Age: 72
End: 2022-09-01

## 2022-09-01 NOTE — TELEPHONE ENCOUNTER
Patient reports that his last check up with the [facility] his cancer returned in his throat.  He is scheduled to have surgery next Tuesday 09/06/22.    Patient tried to get a palliative care person but due to needing the surgery right away, and only being in the hospital for a couple days after, it was not feasible for this.  It was recommended that patient follow up with his PCP after surgery for his care/pain control.    Patient is concerned about his pain being managed after surgery.  He is wanting to have a second pain med prescribed so when he gets home its ready to go and he doesn't have to worry.    Educated patient that after his surgery, the surgeon will have him on pain medication, he will then follow a provider in the hospital who will most likely send him home on some pain medication.  From there he will follow up with PCP for further discussion on how he will proceed with pain management.    Patient wanted to point our that he cannot take methadone as he has serious side effects and that oxycodone does not work.  He said the [facility] gave him dilaudid which worked the best.    Patient asked that this be sent to PCP as ALEXY.

## 2022-09-02 ENCOUNTER — PREP FOR PROCEDURE (OUTPATIENT)
Dept: OTOLARYNGOLOGY | Facility: CLINIC | Age: 72
End: 2022-09-02

## 2022-09-02 ENCOUNTER — ANESTHESIA EVENT (OUTPATIENT)
Dept: SURGERY | Facility: CLINIC | Age: 72
End: 2022-09-02
Payer: MEDICARE

## 2022-09-02 ENCOUNTER — MYC MEDICAL ADVICE (OUTPATIENT)
Dept: OTOLARYNGOLOGY | Facility: CLINIC | Age: 72
End: 2022-09-02

## 2022-09-02 ENCOUNTER — LAB (OUTPATIENT)
Dept: LAB | Facility: CLINIC | Age: 72
End: 2022-09-02
Payer: MEDICARE

## 2022-09-02 DIAGNOSIS — Z01.812 ENCOUNTER FOR PREPROCEDURE SCREENING LABORATORY TESTING FOR COVID-19: Primary | ICD-10-CM

## 2022-09-02 DIAGNOSIS — Z11.52 ENCOUNTER FOR PREPROCEDURE SCREENING LABORATORY TESTING FOR COVID-19: Primary | ICD-10-CM

## 2022-09-02 PROCEDURE — U0005 INFEC AGEN DETEC AMPLI PROBE: HCPCS

## 2022-09-02 PROCEDURE — U0003 INFECTIOUS AGENT DETECTION BY NUCLEIC ACID (DNA OR RNA); SEVERE ACUTE RESPIRATORY SYNDROME CORONAVIRUS 2 (SARS-COV-2) (CORONAVIRUS DISEASE [COVID-19]), AMPLIFIED PROBE TECHNIQUE, MAKING USE OF HIGH THROUGHPUT TECHNOLOGIES AS DESCRIBED BY CMS-2020-01-R: HCPCS

## 2022-09-03 DIAGNOSIS — C77.0 SECONDARY MALIGNANCY OF LYMPH NODES OF HEAD, FACE AND NECK (H): ICD-10-CM

## 2022-09-03 DIAGNOSIS — C09.9 SQUAMOUS CELL CARCINOMA OF TONSIL (H): Primary | ICD-10-CM

## 2022-09-03 LAB — SARS-COV-2 RNA RESP QL NAA+PROBE: NEGATIVE

## 2022-09-03 PROCEDURE — 99443 PR PHYSICIAN TELEPHONE EVALUATION 21-30 MIN: CPT | Mod: 95 | Performed by: OTOLARYNGOLOGY

## 2022-09-03 NOTE — PROGRESS NOTES
Phone call start: 12:09 pm   Phone call end: 12:33 pm      Called and spoke with patient about the PET scan findings and tumor board recommendations. We discussed that if the rib is positive that his tumor is no longer curable. Surgery as scheduled on Tuesday would not address the rib. We discussed that we will try to add the neck dissection on for the same day but that would be at the discretion of the OR based on availability. Unable to make this addition on Friday because staff was gone. We discussed that the recommendation from tumor board was to do the rib biopsy before surgery but as he is declining this recommendation, we can proceed with surgery as long as he understands that we would be proceeding without knowing if he has distant disease. We again reviewed that the surgery will likely cause pain and issues with his swallowing which could be short or long term. We discussed that the NG tube is non-negotiable especially in light of his previous issues with treatment. If the swallowing issues are long term he could require PEG placement. We discussed the risk of aspiration that could be temporary or permanent. We discussed that with the neck dissection he will have a NENA drain placed. We discussed with the neck dissection there is risk to the marginal mandibular nerve, spinal accessory nerve, hypoglossal nerve, vagus nerve, phrenic nerve. We discussed need for postoperative PT for his shoulder rehab. He should anticipate lymphedema with neck dissection and need for therapy. We discussed that treatment of the rib could be chemotherapy/immunotherapy vs SBRT. We discussed that if there is distant metastasis then he could be going through the surgery with incurable disease. We discussed his pain management postoperatively and that he may require palliative care involved in the hospital depending on the extent of his pain and ability to control.    After a lengthy conversation the patient wants to proceed with  surgery on Tuesday, ideally with concurrent neck dissection. Case modification placed for the neck dissection. He understands that IR scheduling of the rib biopsy will be at the discretion of radiology and not be done during his hospitalization.      Ligia Fang MD    Department of Otolaryngology        A total of 30 minutes was spent on phone call and charting activities on 9/3/2022.      CC:  Ahmet Robbins MD  Department of Radiation Oncology  [facility]    Shanthi Schrader MD  Department of Medical Oncology  [facility]

## 2022-09-05 RX ORDER — SODIUM CHLORIDE, SODIUM LACTATE, POTASSIUM CHLORIDE, CALCIUM CHLORIDE 600; 310; 30; 20 MG/100ML; MG/100ML; MG/100ML; MG/100ML
INJECTION, SOLUTION INTRAVENOUS CONTINUOUS
Status: CANCELLED | OUTPATIENT
Start: 2022-09-05

## 2022-09-05 RX ORDER — LABETALOL HYDROCHLORIDE 5 MG/ML
10 INJECTION, SOLUTION INTRAVENOUS
Status: CANCELLED | OUTPATIENT
Start: 2022-09-05

## 2022-09-05 RX ORDER — FENTANYL CITRATE 50 UG/ML
25 INJECTION, SOLUTION INTRAMUSCULAR; INTRAVENOUS EVERY 5 MIN PRN
Status: CANCELLED | OUTPATIENT
Start: 2022-09-05

## 2022-09-05 RX ORDER — ONDANSETRON 4 MG/1
4 TABLET, ORALLY DISINTEGRATING ORAL EVERY 30 MIN PRN
Status: CANCELLED | OUTPATIENT
Start: 2022-09-05

## 2022-09-05 RX ORDER — ONDANSETRON 2 MG/ML
4 INJECTION INTRAMUSCULAR; INTRAVENOUS EVERY 30 MIN PRN
Status: CANCELLED | OUTPATIENT
Start: 2022-09-05

## 2022-09-05 RX ORDER — OXYCODONE HYDROCHLORIDE 5 MG/1
5 TABLET ORAL EVERY 4 HOURS PRN
Status: CANCELLED | OUTPATIENT
Start: 2022-09-05

## 2022-09-05 RX ORDER — HYDROMORPHONE HYDROCHLORIDE 1 MG/ML
0.2 INJECTION, SOLUTION INTRAMUSCULAR; INTRAVENOUS; SUBCUTANEOUS EVERY 5 MIN PRN
Status: CANCELLED | OUTPATIENT
Start: 2022-09-05

## 2022-09-05 NOTE — ANESTHESIA PREPROCEDURE EVALUATION
Anesthesia Pre-Procedure Evaluation    Patient: Quan Murphy   MRN: [id number] : 1950        Procedure : Procedure(s):  left radical tonsillectomy and nasogastric tube placement          Past Medical History:   Diagnosis Date     Allergic rhinitis      Allergy, unspecified not elsewhere classified     Seasonal allergies, pollen, dust, smoke and animals     Antiplatelet or antithrombotic long-term use      Anxiety      Arthritis      Chest pain      Chronic sinusitis      Coronary atherosclerosis of unspecified type of vessel, native or graft     Coronary artery disease     Depressive disorder      Gastroesophageal reflux disease     Cleared with medication     Head injury      Hiatal hernia     Right Side     History of blood transfusion 12/15/2004    Prostate Surgery - My own blood     Hyperlipidemia      Hypertension      Inguinal hernia      Kidney problem 10/08/2017    Lithotripsy     Kidney stones      Malignant neoplasm of prostate (H)     Prostate cancer     Prostate cancer (H)      Syncope       Past Surgical History:   Procedure Laterality Date     ARTHRODESIS FOOT  2013    Procedure: ARTHRODESIS FOOT;  Great Toe Arthrodesis Left Foot;  Surgeon: Ash Gonzalez DPM;  Location: [facility]     ARTHRODESIS FOOT  6/10/2014    Procedure: ARTHRODESIS FOOT;  Surgeon: Ash Gonzalez DPM;  Location: [facility]     BIOPSY  10/1/2004    dermatologist biopsies     COLONOSCOPY  10/7/2013    Procedure: COLONOSCOPY;  Colonoscopy;  Surgeon: Mike Fallon MD;  Location: [facility]     CYSTOSCOPY N/A 2022    Procedure: CYSTOSCOPY and bladder stone removal;  Surgeon: David Rogers MD;  Location: [facility]     ESOPHAGOSCOPY, GASTROSCOPY, DUODENOSCOPY (EGD), COMBINED N/A 2020    Procedure: ESOPHAGOGASTRODUODENOSCOPY (EGD);  Surgeon: Sam Escobar MD;  Location:  GI     EXTRACORPOREAL SHOCK WAVE LITHOTRIPSY (ESWL) Bilateral 10/18/2017    Procedure: EXTRACORPOREAL SHOCK WAVE  LITHOTRIPSY (ESWL);  BILATERAL EXTRACORPOREAL SHOCKWAVE LITHOTRIPSY ;  Surgeon: Meir Torres MD;  Location: [facility]     HC CORRECT BUNION,SIMPLE  08/11/2005    x3     HC REMV TOE BENIGN BONE LESN  08/11/2005     HEAD & NECK SURGERY  1954    From a fall     HERNIORRHAPHY INGUINAL  7/3/2013    Procedure: HERNIORRHAPHY INGUINAL;  Open Repair Inguinal hernia Right with mesh ;  Surgeon: Sam Escobar MD;  Location: [facility]     IR GASTROSTOMY TUBE PERCUTANEOUS PLCMNT  6/7/2021     MOHS MICROGRAPHIC PROCEDURE  08/23/11    ear and chin-[facility]     OPEN REDUCTION INTERNAL FIXATION WRIST Right 7/18/2017    Procedure: OPEN REDUCTION INTERNAL FIXATION WRIST;  Right distal radius open reduction and internal fixation;  Surgeon: Pedro Blanca DO;  Location: [facility]     RECONSTRUCT FOREFOOT WITH METATARSOPHALANGEAL (MTP) FUSION  6/10/2014    Procedure: RECONSTRUCT FOREFOOT WITH METATARSOPHALANGEAL (MTP) FUSION;  Surgeon: Ash Gonzalez DPM;  Location: [facility]     STENT, CORONARY, DEMI       SURGICAL HISTORY OF -   1999/1974    lt knee     SURGICAL HISTORY OF -   10/2004    lithotripsy     SURGICAL HISTORY OF -   11/05    angiogram with stent     VASCULAR SURGERY  11/17/2005    Puncture of iliac artery during and angiogram     [facility] LAPAROSCOPY, SURGICAL PROSTATECTOMY, RETROPUBIC RADICAL, W/NERVE SPARING  11/30/2004    With full bilateral pelvic lymphadenectomy.  [facility].     [facility] TOTAL KNEE ARTHROPLASTY  05/01/08    Left knee      Allergies   Allergen Reactions     Animal Dander      Azithromycin Nausea and Vomiting     Dust Mites      Pollen Extract      Smoke.       Social History     Tobacco Use     Smoking status: Never Smoker     Smokeless tobacco: Never Used   Substance Use Topics     Alcohol use: Yes     Alcohol/week: 10.0 standard drinks     Types: 10 Cans of beer per week     Comment: 1 hard  liquid and 1 wine or beer per day      Wt Readings from Last 1 Encounters:   08/31/22 86.6 kg (191  lb)        Anesthesia Evaluation            ROS/MED HX  ENT/Pulmonary: Comment: Chronic sinusitis     Squamous cell carcinoma of left tonsil     (+) sleep apnea,     Neurologic:  - neg neurologic ROS     Cardiovascular: Comment: History of right iliac pseudoaneurysm, s/p stenting following angiogram in 2005    (+) Dyslipidemia hypertension--CAD --stent-12/28/2016. 1 Drug Eluting Stent. fainting (syncope). Previous cardiac testing   Echo: Date: Results:    Stress Test: Date: 3/1/22 Results:  The nuclear stress test is probably negative for inducible myocardial ischemia or infarction. There is a fixed defect in inferoseptal wall with normal wall motion likely diaphragm attenuation.  Left ventricular function is normal, LVEF at stress is 61%.    ECG Reviewed: Date: 8/31/22 Results:  NSR  Cath: Date: Results:   (-) taking anticoagulants/antiplatelets   METS/Exercise Tolerance:     Hematologic:       Musculoskeletal:   (+) arthritis,     GI/Hepatic:     (+) GERD, Asymptomatic on medication, hiatal hernia,     Renal/Genitourinary:     (+) renal disease, type: CRI, Pt does not require dialysis,     Endo:     (+) thyroid problem, hypothyroidism,     Psychiatric/Substance Use:     (+) psychiatric history anxiety H/O chronic opiod use .     Infectious Disease:       Malignancy:   (+) Malignancy, History of Prostate and Other.Prostate CA Remission status post Surgery.  Other CA Left tonsillar SCC, s/p chemoradiation in 2021 with recurrence status post.    Other:               OUTSIDE LABS:  CBC:   Lab Results   Component Value Date    WBC 3.4 (L) 02/26/2022    WBC 4.4 11/15/2021    HGB 12.5 (L) 02/26/2022    HGB 15.3 11/15/2021    HCT 36.6 (L) 02/26/2022    HCT 47.3 11/15/2021     02/26/2022     (L) 11/15/2021     BMP:   Lab Results   Component Value Date     02/26/2022     11/15/2021    POTASSIUM 4.0 02/26/2022    POTASSIUM 4.3 11/15/2021    CHLORIDE 106 02/26/2022    CHLORIDE 104 11/15/2021    CO2  24 02/26/2022    CO2 30 11/15/2021    BUN 15 02/26/2022    BUN 27 11/15/2021    CR 1.4 (H) 08/19/2022    CR 1.6 (H) 05/13/2022    GLC 98 02/26/2022     (H) 11/15/2021     COAGS:   Lab Results   Component Value Date    PTT 47 (H) 10/28/2017    INR 1.49 (H) 06/07/2021     POC:   Lab Results   Component Value Date    BGM 85 06/06/2021     HEPATIC:   Lab Results   Component Value Date    ALBUMIN 3.4 11/01/2021    PROTTOTAL 6.9 11/01/2021    ALT 66 11/15/2021    AST 34 11/01/2021    ALKPHOS 78 11/01/2021    BILITOTAL 0.7 11/01/2021     OTHER:   Lab Results   Component Value Date    PH 7.52 (H) 06/04/2021    LACT 2.8 (H) 06/04/2021    LATRELL 9.2 02/26/2022    PHOS 3.1 06/11/2021    MAG 2.3 11/15/2021    LIPASE 99 02/11/2020    TSH 0.06 (L) 08/16/2022    T4 1.19 08/16/2022    CRP 8.6 (H) 02/11/2020    SED 8 02/11/2020       Anesthesia Plan    ASA Status:  3   NPO Status:  NPO Appropriate    Anesthesia Type: General.     - Airway: ETT   Induction: Intravenous.   Maintenance: Inhalation.   Techniques and Equipment:     - Airway: Video-Laryngoscope     - Lines/Monitors: 2nd IV, Arterial Line     Consents            Postoperative Care    Pain management: IV analgesics, Oral pain medications, Multi-modal analgesia.   PONV prophylaxis: Ondansetron (or other 5HT-3), Dexamethasone or Solumedrol     Comments:                David Larkin MD

## 2022-09-06 ENCOUNTER — HOSPITAL ENCOUNTER (OUTPATIENT)
Facility: CLINIC | Age: 72
Discharge: HOME | End: 2022-09-06
Attending: OTOLARYNGOLOGY | Admitting: OTOLARYNGOLOGY
Payer: MEDICARE

## 2022-09-06 ENCOUNTER — ANESTHESIA (OUTPATIENT)
Dept: SURGERY | Facility: CLINIC | Age: 72
End: 2022-09-06
Payer: MEDICARE

## 2022-09-06 VITALS
HEART RATE: 64 BPM | TEMPERATURE: 97.5 F | DIASTOLIC BLOOD PRESSURE: 89 MMHG | SYSTOLIC BLOOD PRESSURE: 134 MMHG | WEIGHT: 192.9 LBS | RESPIRATION RATE: 16 BRPM | BODY MASS INDEX: 29.24 KG/M2 | HEIGHT: 68 IN | OXYGEN SATURATION: 99 %

## 2022-09-06 DIAGNOSIS — M89.9 RIB LESION: Primary | ICD-10-CM

## 2022-09-06 DIAGNOSIS — M89.9 BONE LESION: Primary | ICD-10-CM

## 2022-09-06 LAB — GLUCOSE BLDC GLUCOMTR-MCNC: 93 MG/DL (ref 70–99)

## 2022-09-06 PROCEDURE — 999N000001 HC CANCELLED SURGERY UP TO 15 MINS: Performed by: OTOLARYNGOLOGY

## 2022-09-06 PROCEDURE — 999N000141 HC STATISTIC PRE-PROCEDURE NURSING ASSESSMENT: Performed by: OTOLARYNGOLOGY

## 2022-09-06 RX ORDER — LIDOCAINE 40 MG/G
CREAM TOPICAL
Status: DISCONTINUED | OUTPATIENT
Start: 2022-09-06 | End: 2022-09-06 | Stop reason: HOSPADM

## 2022-09-06 RX ORDER — AMPICILLIN AND SULBACTAM 2; 1 G/1; G/1
3 INJECTION, POWDER, FOR SOLUTION INTRAMUSCULAR; INTRAVENOUS
Status: DISCONTINUED | OUTPATIENT
Start: 2022-09-06 | End: 2022-09-06 | Stop reason: HOSPADM

## 2022-09-06 RX ORDER — ACETAMINOPHEN 325 MG/1
975 TABLET ORAL ONCE
Status: DISCONTINUED | OUTPATIENT
Start: 2022-09-06 | End: 2022-09-06 | Stop reason: HOSPADM

## 2022-09-06 RX ORDER — SODIUM CHLORIDE, SODIUM LACTATE, POTASSIUM CHLORIDE, CALCIUM CHLORIDE 600; 310; 30; 20 MG/100ML; MG/100ML; MG/100ML; MG/100ML
INJECTION, SOLUTION INTRAVENOUS CONTINUOUS
Status: DISCONTINUED | OUTPATIENT
Start: 2022-09-06 | End: 2022-09-06 | Stop reason: HOSPADM

## 2022-09-06 RX ORDER — DEXAMETHASONE SODIUM PHOSPHATE 10 MG/ML
10 INJECTION, SOLUTION INTRAMUSCULAR; INTRAVENOUS ONCE
Status: DISCONTINUED | OUTPATIENT
Start: 2022-09-06 | End: 2022-09-06 | Stop reason: HOSPADM

## 2022-09-06 RX ORDER — AMPICILLIN AND SULBACTAM 1; .5 G/1; G/1
1.5 INJECTION, POWDER, FOR SOLUTION INTRAMUSCULAR; INTRAVENOUS SEE ADMIN INSTRUCTIONS
Status: DISCONTINUED | OUTPATIENT
Start: 2022-09-06 | End: 2022-09-06 | Stop reason: HOSPADM

## 2022-09-06 ASSESSMENT — ACTIVITIES OF DAILY LIVING (ADL): ADLS_ACUITY_SCORE: 20

## 2022-09-06 NOTE — PROGRESS NOTES
Patient seen in the preop area. Had another discussion with him again about the procedures, and the options.     Specifically discussed:  -Proceeding with surgery today, will still need rib biopsy. Surgery would address oropharynx and neck, would not address rib. If rib positive, could receive SBRT vs systemic therapy.  -Cancel surgery, proceed with rib biopsy. If rib biopsy positive, could have systemic therapy to address all 3 sites of disease, or could have surgery to the oropharynx, neck with SBRT to the rib.  -Cancel surgery, proceed with rib biopsy, if rib biopsy negative, would need either systemic therapy vs surgery as previously discussed.     Discussed that rescheduled surgery would likely be October pending OR/staffing availability.    Discussed that no guarantees of how tumor would respond to systemic therapy - could stay stable, increase, or decrease. Discussed that PDL1 is high and is a good indicator for responding but certainly not a guarantee. If the oropharyngeal tumor grew, may not be resectable or if resectable may require a bigger resection and more/longer term consequences on swallowing.    Discussed that with surgery today goal would be removal of entire oropharynx tumor and removal of neck disease. Discussed that would likely at least temporarily impact swallowing, but could be long term. Discussed that extent of soft palate resection would depend on margins and could not make promises.     After lengthy discussions, patient will proceed with rib biopsy - explained that scheduling at discretion of radiology, and likely 1-2 weeks out at least. Patient and wife would like to cancel surgery for today.    Patient was discharged to home.      Ligia Fang MD    Department of Otolaryngology

## 2022-09-06 NOTE — CONSULTS
Patient referred to interventional radiology by:     IR Referral [[id number]]  Electronically signed by: Ligia Fang MD on 09/06/22 0921     Order Questions  Question Answer   What is the reason for the IR order request? Biopsy   What type of biopsy is needed? Bone   Which bone needs a biopsy? posterior right 7th rib   Patients clinical information/history? SCC of tonsil, chemoradiation completed - new lesion on rib from PET scan on 8/30   Is this biopsy procedure for research? No   Specimen orders should be placed per site protocol Acknowledge   Call Back # [phone]   Is the patient on aspirin, Plavix or blood thinners? No     Associated Diagnoses  Rib lesion [M89.9]  - Primary         ===  IMAGING    PET and CT on 8/30/2022     INDICATION: Squamous cell carcinoma of tonsil (H).     ADDITIONAL INFORMATION OBTAINED FROM EMR: 72-year-old male with  history of squamous cell carcinoma of the left tonsil status post  chemoradiation with recently diagnosed left superior tonsil/soft  palate recurrence not yet treated.    (Partial findings) BONES:   New FDG avid sclerotic lesion in the posterior right 7th rib (series 4  image 141) with Max SUV 10.5.     (Partial) IMPRESSION:   In this patient with history of squamous cell carcinoma of the left  tonsil with recently diagnosed recurrence:     1. New FDG avid sclerotic lesion in the posterior right 7th rib,  suspicious for metastatic disease.        ===    Review of Epic entered chart data shows the patient is on no anticoagulation      Complete Blood Count:  Lab Results   Component Value Date     02/26/2022     07/02/2021       Coagulation:  Lab Results   Component Value Date    INR 1.49 06/07/2021     ===  PLAN    Referring provider: Ligia Fang MD in [facility]. Priority: Routine: Next available opening. Diagnosis: Rib lesion [M89.9].  Referral info: Biopsy, bone, posterior right seventh rib, SCC of tonsil, chemoradiation completed - new  "lesion on rib from PET scan on 8/30.  Research biopsy: No.  Callback number: [phone].  Aspirin, Plavix or blood thinners?  No.     Case request and imaging reviewed with IR Dr. Cece Magana.    Approved for CT-guided biopsy of posterior right seventh rib bone.    I will forward to procedure schedulers. Pre-procedure IR clinic visit is not necessary.    I have entered the following specimen orders in the correct Epic EMR status (Signed & Held) for a pending admission for procedure on behalf of the referring provider:  [id number] - Tissue, Gross and Microscopic Examination, \"Surgical Pathology\"    If there are additional specimen orders desired by the referring provider, IR can assist in ordering if standard lab names and/or Epic Code numbers are provided to IR directly by the referring provider.      "

## 2022-09-07 ENCOUNTER — DOCUMENTATION ONLY (OUTPATIENT)
Dept: OTHER | Facility: CLINIC | Age: 72
End: 2022-09-07

## 2022-09-08 ENCOUNTER — MYC MEDICAL ADVICE (OUTPATIENT)
Dept: FAMILY MEDICINE | Facility: CLINIC | Age: 72
End: 2022-09-08

## 2022-09-08 ENCOUNTER — MYC MEDICAL ADVICE (OUTPATIENT)
Dept: INTERVENTIONAL RADIOLOGY/VASCULAR | Facility: CLINIC | Age: 72
End: 2022-09-08

## 2022-09-12 ENCOUNTER — MYC MEDICAL ADVICE (OUTPATIENT)
Dept: FAMILY MEDICINE | Facility: CLINIC | Age: 72
End: 2022-09-12

## 2022-09-14 ENCOUNTER — APPOINTMENT (OUTPATIENT)
Dept: INTERVENTIONAL RADIOLOGY/VASCULAR | Facility: CLINIC | Age: 72
End: 2022-09-14
Attending: OTOLARYNGOLOGY
Payer: MEDICARE

## 2022-09-14 ENCOUNTER — APPOINTMENT (OUTPATIENT)
Dept: MEDSURG UNIT | Facility: CLINIC | Age: 72
End: 2022-09-14
Attending: OTOLARYNGOLOGY
Payer: MEDICARE

## 2022-09-14 ENCOUNTER — HOSPITAL ENCOUNTER (OUTPATIENT)
Facility: CLINIC | Age: 72
Discharge: HOME | End: 2022-09-14
Attending: OTOLARYNGOLOGY | Admitting: RADIOLOGY
Payer: MEDICARE

## 2022-09-14 VITALS
OXYGEN SATURATION: 98 % | HEART RATE: 55 BPM | SYSTOLIC BLOOD PRESSURE: 153 MMHG | DIASTOLIC BLOOD PRESSURE: 78 MMHG | WEIGHT: 194.2 LBS | RESPIRATION RATE: 16 BRPM | BODY MASS INDEX: 29.53 KG/M2 | TEMPERATURE: 98 F

## 2022-09-14 DIAGNOSIS — M89.9 BONE LESION: ICD-10-CM

## 2022-09-14 LAB
ERYTHROCYTE [DISTWIDTH] IN BLOOD BY AUTOMATED COUNT: 14.2 % (ref 10–15)
HCT VFR BLD AUTO: 39.6 % (ref 40–53)
HGB BLD-MCNC: 12.8 G/DL (ref 13.3–17.7)
INR PPP: 0.98 (ref 0.85–1.15)
MCH RBC QN AUTO: 29.4 PG (ref 26.5–33)
MCHC RBC AUTO-ENTMCNC: 32.3 G/DL (ref 31.5–36.5)
MCV RBC AUTO: 91 FL (ref 78–100)
PLATELET # BLD AUTO: 162 10E3/UL (ref 150–450)
RBC # BLD AUTO: 4.36 10E6/UL (ref 4.4–5.9)
WBC # BLD AUTO: 3.5 10E3/UL (ref 4–11)

## 2022-09-14 PROCEDURE — 77012 CT SCAN FOR NEEDLE BIOPSY: CPT | Mod: 26 | Performed by: RADIOLOGY

## 2022-09-14 PROCEDURE — 250N000009 HC RX 250: Performed by: RADIOLOGY

## 2022-09-14 PROCEDURE — 999N000134 HC STATISTIC PP CARE STAGE 3

## 2022-09-14 PROCEDURE — 272N000155 HC KIT CR15

## 2022-09-14 PROCEDURE — 99152 MOD SED SAME PHYS/QHP 5/>YRS: CPT

## 2022-09-14 PROCEDURE — 20220 BONE BIOPSY TROCAR/NDL SUPFC: CPT | Performed by: RADIOLOGY

## 2022-09-14 PROCEDURE — 258N000003 HC RX IP 258 OP 636: Performed by: NURSE PRACTITIONER

## 2022-09-14 PROCEDURE — 85027 COMPLETE CBC AUTOMATED: CPT | Performed by: NURSE PRACTITIONER

## 2022-09-14 PROCEDURE — 999N000142 HC STATISTIC PROCEDURE PREP ONLY

## 2022-09-14 PROCEDURE — 88311 DECALCIFY TISSUE: CPT | Mod: TC | Performed by: OTOLARYNGOLOGY

## 2022-09-14 PROCEDURE — 85610 PROTHROMBIN TIME: CPT | Performed by: NURSE PRACTITIONER

## 2022-09-14 PROCEDURE — 20220 BONE BIOPSY TROCAR/NDL SUPFC: CPT

## 2022-09-14 PROCEDURE — 36415 COLL VENOUS BLD VENIPUNCTURE: CPT | Performed by: NURSE PRACTITIONER

## 2022-09-14 PROCEDURE — 250N000011 HC RX IP 250 OP 636: Performed by: STUDENT IN AN ORGANIZED HEALTH CARE EDUCATION/TRAINING PROGRAM

## 2022-09-14 PROCEDURE — 99152 MOD SED SAME PHYS/QHP 5/>YRS: CPT | Performed by: RADIOLOGY

## 2022-09-14 RX ORDER — NALOXONE HYDROCHLORIDE 0.4 MG/ML
0.2 INJECTION, SOLUTION INTRAMUSCULAR; INTRAVENOUS; SUBCUTANEOUS
Status: DISCONTINUED | OUTPATIENT
Start: 2022-09-14 | End: 2022-09-14 | Stop reason: HOSPADM

## 2022-09-14 RX ORDER — ACETAMINOPHEN 325 MG/1
650 TABLET ORAL EVERY 4 HOURS PRN
Status: DISCONTINUED | OUTPATIENT
Start: 2022-09-14 | End: 2022-09-14 | Stop reason: HOSPADM

## 2022-09-14 RX ORDER — FLUMAZENIL 0.1 MG/ML
0.2 INJECTION, SOLUTION INTRAVENOUS
Status: DISCONTINUED | OUTPATIENT
Start: 2022-09-14 | End: 2022-09-14 | Stop reason: HOSPADM

## 2022-09-14 RX ORDER — NALOXONE HYDROCHLORIDE 0.4 MG/ML
0.4 INJECTION, SOLUTION INTRAMUSCULAR; INTRAVENOUS; SUBCUTANEOUS
Status: DISCONTINUED | OUTPATIENT
Start: 2022-09-14 | End: 2022-09-14 | Stop reason: HOSPADM

## 2022-09-14 RX ORDER — BUPIVACAINE HYDROCHLORIDE 5 MG/ML
5 INJECTION, SOLUTION EPIDURAL; INTRACAUDAL ONCE
Status: COMPLETED | OUTPATIENT
Start: 2022-09-14 | End: 2022-09-14

## 2022-09-14 RX ORDER — SODIUM CHLORIDE 9 MG/ML
INJECTION, SOLUTION INTRAVENOUS CONTINUOUS
Status: DISCONTINUED | OUTPATIENT
Start: 2022-09-14 | End: 2022-09-14 | Stop reason: HOSPADM

## 2022-09-14 RX ORDER — FENTANYL CITRATE 50 UG/ML
25-50 INJECTION, SOLUTION INTRAMUSCULAR; INTRAVENOUS EVERY 5 MIN PRN
Status: DISCONTINUED | OUTPATIENT
Start: 2022-09-14 | End: 2022-09-14 | Stop reason: HOSPADM

## 2022-09-14 RX ORDER — LIDOCAINE 40 MG/G
CREAM TOPICAL
Status: DISCONTINUED | OUTPATIENT
Start: 2022-09-14 | End: 2022-09-14 | Stop reason: HOSPADM

## 2022-09-14 RX ADMIN — BUPIVACAINE HYDROCHLORIDE 5 ML: 5 INJECTION, SOLUTION EPIDURAL; INTRACAUDAL; PERINEURAL at 12:12

## 2022-09-14 RX ADMIN — FENTANYL CITRATE 25 MCG: 50 INJECTION, SOLUTION INTRAMUSCULAR; INTRAVENOUS at 12:08

## 2022-09-14 RX ADMIN — MIDAZOLAM HYDROCHLORIDE 0.5 MG: 1 INJECTION, SOLUTION INTRAMUSCULAR; INTRAVENOUS at 12:06

## 2022-09-14 RX ADMIN — FENTANYL CITRATE 50 MCG: 50 INJECTION, SOLUTION INTRAMUSCULAR; INTRAVENOUS at 11:46

## 2022-09-14 RX ADMIN — FENTANYL CITRATE 50 MCG: 50 INJECTION, SOLUTION INTRAMUSCULAR; INTRAVENOUS at 12:00

## 2022-09-14 RX ADMIN — MIDAZOLAM HYDROCHLORIDE 1 MG: 1 INJECTION, SOLUTION INTRAMUSCULAR; INTRAVENOUS at 11:59

## 2022-09-14 RX ADMIN — MIDAZOLAM HYDROCHLORIDE 1 MG: 1 INJECTION, SOLUTION INTRAMUSCULAR; INTRAVENOUS at 11:46

## 2022-09-14 RX ADMIN — SODIUM CHLORIDE: 9 INJECTION, SOLUTION INTRAVENOUS at 10:15

## 2022-09-14 RX ADMIN — MIDAZOLAM HYDROCHLORIDE 0.5 MG: 1 INJECTION, SOLUTION INTRAMUSCULAR; INTRAVENOUS at 12:15

## 2022-09-14 RX ADMIN — FENTANYL CITRATE 25 MCG: 50 INJECTION, SOLUTION INTRAMUSCULAR; INTRAVENOUS at 12:15

## 2022-09-14 ASSESSMENT — ACTIVITIES OF DAILY LIVING (ADL)
ADLS_ACUITY_SCORE: 35

## 2022-09-14 NOTE — PROGRESS NOTES
1315 Dr. Newberry here to assess site. Ice pack and rolled towel applied to site for direct pressure, will continue to monitor.    1345 Site f/d/i. No pain. Pt tolerated ambulation and oral intake. Received order from Dr. Lawrence to continue to monitor pt one more hour. Direct pressure removed.    1415 Site noted to have a small hematoma. Direct pressure applied to site again. Will notify MD when out of procedure.    1430 Dr. Newberry here to assess site and pt. Will continue to monitor.    1530 Dr. Lawrence here to assess site and pt. Pressure dressing applied with gauze roll and foam tape. Will continue to monitor.    1630 Dr. Lawrence and Dr. Newberry here to see pt. Pressure dressing removed.     1645 Dr. Newberry here to assess site. Awaiting orders on discharge.  Report given to Brisa Coates RN.

## 2022-09-14 NOTE — PROGRESS NOTES
Patient Name: Quan Murphy  Medical Record Number: [id number]  Today's Date: 9/14/2022    Procedure: CT-guided Right Superficial Bone Biopsy  Proceduralist: Dr. Coni Starks  Pathology present: No    Procedure Start: 12:12  Procedure end: 12:25  Sedation Start: 11:46  Sedation End: 12:25  Total Sedation Time: 39 minutes   Sedation medications administered: Midazolam 3 mg, Fentanyl 150 mcg       Report given to: RUTH Neri   : N/A    Other Notes: Pt arrived to IR room CT2 from . Consent reviewed. Pt denies any questions or concerns regarding procedure. Pt positioned prone and monitored per protocol. 1 cores obtained and sent to lab as ordered.  Tegaderm x2 used to close biopsy needle track.  Hemastatis achieved.  Pt tolerated procedure without any noted complications. Pt transferred back to .

## 2022-09-14 NOTE — PROGRESS NOTES
New is a 72 year old who is being evaluated via a billable video visit.      How would you like to obtain your AVS? MyChart  If the video visit is dropped, the invitation should be resent by: Send to e-mail at: [email or web address]  Will anyone else be joining your video visit? Tiff Velaua Wolf    Video-Visit Details  Video Start Time: 8:15 AM  Type of service:  Video Visit  Video End Time:9:05 AM  Originating Location (pt. Location): Home  Distant Location (provider location):  [facility]   Platform used for Video Visit: [facility]    PATIENT NAME: Quan Murphy  MRN # [id number]   DATE OF VISIT: September 15, 2022  YOB: 1950     Referring Provider: Dr. Ligia Fang, Otolaryngology  PCP: Dr. Jose Hernandez, FP in [place]    CANCER TYPE: SCC L tonsil, p16 +gardenia  STAGE: cT2N2 (II)  ECOG PS: 1    PD-L1: TPS on [id number] 90%  NGS: N/A    SUMMARY  3/22/21 L tonsil bx in clinic (Dr. Christensen). Path: SCC, non keratinizing, p16 +gardenia  3/23/21 CT neck. 2.9 x 2.7 x 3.5 cm L tonsil fullness involving soft palate, level 2-3 neck nodes  4/2/21 PET/CT. 2.7 x 2.2 x 2.7 cm L palatine tonsil mass (SUV 24), extension to oral cavity, 3.4 x 2.0 cm L level 4 node invading SCM, 1.8 x 1.8 cm L level 2A/3 node (SUV 7.9) w/ECS, 1.4 x 0.9 cm R level 2A node (SUV 6.7)  4/28~6/12/21  Chemoradiation with weekly cisplatin. No HD cisplatin due to CKD. Cisplatin held 6/2/21 due to malnutrition, SURESH  5/21/21 [facility] ED for fever to 102. No localizing source, cultures negative, not neutropenic  6/2~6/9/21 [facility] for malnutrition, inability to tolerate PO, SURESH. C/b respiratory arrest due to opioids, aspiration pneumonia, anxiety  8/19/22 CT neck. 3 month post PET/CT follow up, early due to indeterminate note on PET/CT. 1.4 x 1.5 x 1.5 cm lesion L posterolateral oropharyngeal mucosa, similar enhancing necrotic L level IIA nodes up to 1 cm.   8/19/22 L tonsil bx in clinic. Path:  SCC, p16 +gardenia. TPS 90%   8/30/22 PET/CT. 12 mm L superior tonsil/oropharynx lesion (SUV 17.1), 11 mm L level 2A node (SUV 10.1), more solid than on pror CTs. New sclerotic lesion R 7th rib (SV 10.5), few patchy posterior RUL opacities largest 10 mm (SUV 3.9), mildly avid 8 mm R hilar node (SUV 4.2)  9/14/22 R rib bx     ASSESSMENT AND PLAN   SCC L tonsil, [id number], p16 +gardenia, SHAYNE +gardenia, TPS 90%: He has a lot of hesitation about surgery due to the risk of swallowing dysfunction. He was in the pre-op area 9/6 for scheduled surgery and opted to delay surgery after discussing again with Dr. Fang, and undergo the rib bx first. He asked questions about surgery, which I encouraged him to discuss with Dr. Fang. We discussed the alternative of systemic therapy, especially in light of most likely early metastatic disease. Bone bx is pending but even if negative, the FDG avidity is high enough that I would still be very suspicious of a met. We discussed goals of treatment, which with a metastatic cancer, is palliative and not curative in nature, therefore the balance between QOL and toxicities changes. However, we ttalked about being aggressive in the setting of minimal oligometastatic disease, but that of course, we still needed to consider the cancer cells we wouldn't be able to visualize as well the radiographically visible disease. We also discussed the rationale for aggressive control of the local recurrence due to the morbidity associated with progression. TPS is high, so we could justify pembrolizumab alone. However, response would be improved by adding carboplatin and 5FU to the pembro, which would make sense if we're being as aggressive as possible. Carboplatin and pembrolizumab are given in the infusion center once every 3 weeks, and 5FU is given continuously over 4 days once every 3 weeks. We would do 2 cycles and restage, completing 4 cycles total in the absence of toxicities, intolerance or progression, before  going on to maintenance pembrolizumab. If the cancer responded well, we could still consider aggressive management of the local recurrence with surgery, and/or SBRT to the rib lesion. We discussed the longer term outcomes from the keynote 048 study, in terms of response, proportion of people alive at 1, 2, etc., years out, and median survival. We talked about the downside of systemic therapy in the sense that if the cancer grew, we might lose our window of opportunity for more aggressive local control without the risk of more substantial impact on QOL. He indicated that he would not be interested in a Gtube even down the road, due to the potential impact on QOL. I tried to explore that a little bit more and it would really be the inability to enjoy food that would be most impactful. He expressed an interest in a second opinion. [facility] would be the nearest tertiary referral center. Discussed that they would likely recommend surgery and I encouraged him to ask questions and discuss thoroughly the potential impact on swallowing and QOL if he went that route. However, a second opinion is a good idea in that there is a clinical trial using an HPV-directed vaccine + pembrolizumab. I would think it's still open. At the end of our long discussion, he was not sure of what he wanted to do but will call [facility] today to request the second opinion appt. I have a referral order in just in case he needs it. Will follow up with him in 2 weeks. I encouraged him not to delay more than that if he was still considering surgery as that would already be a month after the last imaging and things could change in the interim with regards to feasibility or the risk of dysphagia. Will also await bone bx.     Hypothyroidism: Levothyroxine     CKD: Baseline ~1.1-1.2, a little higher on the quick tests done prior to CT.    80 minutes spent on the date of the encounter doing chart review, history and exam, documentation and further activities  per the note     Shanthi Schrader MD  Associate Professor of Medicine  Hematology, Oncology and Transplantation      SUBJECTIVE  New returns for follow up of recurrent SCC L tonsil. Dr. Fang had discussed surgery but he has significant hesitations due to the risk of dysphagia. He's physically doing ok - no major changes since the last visit with me.     PAST MEDICAL HISTORY  SCC as above  ASCVD. Angina in 12/2016. [facility] with proximal LAD stenosis, s/p PTCA and stent. Not on metoprolol due to hypotension. Ses Dr. Aramis Ch, Cardiologist.  CKD baseline ~ 1.2-1.3, up to 1.93 on 11/2/20  HTN  Dyslipidemia  H/o prostate ca s/p robotic prostatectomy about 16 years ago followed by Dr. Meir Torres at [facility] in [place]  Nephrolithasis.   Bladder stones. S/p litholapaxy of 2 stones 2/16/22 (Dr. Rogers)  Anxiety, insomnia. Takes zolpidem and alprazolam nightly   OA  Chronic sinusitus. Takes chronic hydrocodone-acetaminophen for this  Inguinal hernia  GERD, hiatal hernia. Met with Dr. Adams 2/18/20, symptoms not due to hiatal hernia  ORIF R radius 2017  H/o retroperitoneal hematoma 10/2017. Admitted to [facility]. [place] to be related to lithotripsy 10 days prior  Hemorrhoids  PAD s/p R iliac artery stent after injury during [facility]    CURRENT OUTPATIENT MEDICATIONS  Current Outpatient Medications   Medication Sig Dispense Refill     ALPRAZolam (XANAX) 0.5 MG tablet Take 1 tablet (0.5 mg) by mouth 4 times daily as needed for anxiety 120 tablet 0     buPROPion (WELLBUTRIN XL) 150 MG 24 hr tablet Take 1 tablet (150 mg) by mouth every morning 30 tablet 1     cetirizine (ZYRTEC) 10 MG tablet Take 10 mg by mouth daily       cevimeline (EVOXAC) 30 MG capsule Take 1 capsule (30 mg) by mouth 3 times daily 120 capsule 11     citalopram (CELEXA) 40 MG tablet Take 1 tablet (40 mg) by mouth daily (Patient taking differently: Take 40 mg by mouth At Bedtime) 90 tablet 1     dronabinol (MARINOL) 2.5 MG capsule Take 1 capsule (2.5 mg) by  mouth 2 times daily (before meals) 28 capsule 3     HYDROcodone-acetaminophen (NORCO)  MG per tablet Take 1 tablet by mouth 4 times daily as needed for severe pain 120 tablet 0     levothyroxine (SYNTHROID/LEVOTHROID) 112 MCG tablet Take 1 tablet (112 mcg) by mouth daily 30 tablet 11     levothyroxine (SYNTHROID/LEVOTHROID) 75 MCG tablet Take 1 tablet (75 mcg) by mouth daily 30 tablet 11     metoprolol succinate ER (TOPROL-XL) 50 MG 24 hr tablet Take 1 tablet (50 mg) by mouth daily 90 tablet 2     omeprazole (PRILOSEC) 40 MG DR capsule Take 1 capsule (40 mg) by mouth daily 90 capsule 3     pilocarpine (SALAGEN) 5 MG tablet Take 1 tablet (5 mg) by mouth 3 times daily 90 tablet 3     rosuvastatin (CRESTOR) 40 MG tablet Take 1 tablet (40 mg) by mouth daily 90 tablet 3     zolpidem ER (AMBIEN CR) 12.5 MG CR tablet TAKE ONE TABLET BY MOUTH NIGHTLY AS NEEDED FOR SLEEP (Patient taking differently: TAKE ONE TABLET BY MOUTH NIGHTLY AS NEEDED FOR SLEEP  Currently taking every night 9/1/22) 30 tablet 3     nitroGLYcerin (NITROSTAT) 0.4 MG sublingual tablet For chest pain place 1 tablet under the tongue every 5 minutes for 3 doses. If symptoms persist 5 minutes after 1st dose call 911. (Patient not taking: No sig reported) 25 tablet 0     ALLERGIES  Allergies   Allergen Reactions     Animal Dander      Azithromycin Nausea and Vomiting     Dust Mites      Pollen Extract      Smoke.       REVIEW OF SYSTEMS  As above in the HPI, o/w complete 12-point ROS was negative.    PHYSICAL EXAM  No vitals due to video visit   GEN: NAD  HEENT: EOMI, no icterus, injection or pallor  NEURO: alert      Remainder of physical exam deferred due to public health emergency and limitations of video visit.    LABORATORY AND IMAGING STUDIES   05/13/22 14:29 05/13/22 16:34 08/16/22 13:15 08/16/22 16:45 08/19/22 12:47 09/06/22 10:01 09/14/22 10:06   Creatinine POCT 1.6 (H)    1.4 (H)     GFR, ESTIMATED POCT 46 (L)    54 (L)     Albumin Urine mg/g  Cr    9.52      Albumin Urine mg/L    6      Creatinine Urine    63      PSA   0.17       T4 Free  1.21 1.19       TSH  0.02 (L) 0.06 (L)       GLUCOSE BY METER POCT      93    WBC       3.5 (L)   Hemoglobin       12.8 (L)   Hematocrit       39.6 (L)   Platelet Count       162   RBC Count       4.36 (L)   MCV       91   MCH       29.4   MCHC       32.3   RDW       14.2   INR       0.98     Labs were independently reviewed and interpreted by me    CT Soft Tissue Neck w Contrast  Narrative: PET CT fusion examination 8/30/2022 12:39 PM  1. Neck CT with contrast  2. PET study of the neck  3. PET CT fusion study of the neck  INDICATION: Squamous cell carcinoma of tonsil (H).    ADDITIONAL INFORMATION OBTAINED FROM EMR: 72-year-old male with  history of squamous cell carcinoma of the left tonsil status post  chemoradiation with recently diagnosed left superior tonsil/soft  palate recurrence not yet treated.    COMPARISON: Same-day PET CT, Neck CT 8/19/2022 and 5/13/2022, chest CT  5/13/2022, PET/CT 9/3/2021    Technique: Please refer to the accompanying whole body PET-CT for  report of the dose and whole body PET-CT findings.  Regarding the neck, axial images were obtained after nonionic  intravenous contrast administration, with sagittal and coronal  reconstructions performed. Neck CT images were reviewed in bone, soft  tissue, and lung windows, with review of the fused PET-CT images as  well in multiple planes.    Findings:  FDG avid approximately 12 mm soft tissue lesion in the left  oropharynx/superior tonsil with max SUV 17.1 (series 6 image 61),  likely corresponding to the previous peripheral enhancing lesion seen  on 8/19/2022 with interval biopsy.     FDG avid 11 mm left level 2A lymph node (series 6 image 74) with Max  SUV 10.1. This node has become more solid on CT when compared with  prior CTs.      Stable posttreatment changes of the neck.    Evaluation of the remaining mucosal space demonstrates no  abnormality  or abnormal metabolic uptake on PET CT in the nasopharynx, hypopharynx  or the glottis. The tongue base otherwise appears normal. The major  salivary glands and thyroid gland appear within normal limits.    Limited evaluation of the cervical vertebral column demonstrates  similar spondylosis with scattered neural foraminal narrowing without  high-grade spinal canal stenosis. Mild mucosal thickening in the right  maxillary sinus. The visualized paranasal sinuses and mastoid air  cells are otherwise clear. Atrophic/atretic left internal jugular  vein. The remaining major vascular structures in the neck are patent.  Atherosclerosis of the carotid bulbs. Left frontal DVA.  Impression: Impression:   In this patient with history of squamous cell carcinoma of the left  tonsil with recently diagnosed recurrence not yet treated:    1. Primary: NI-RADS 4 -- FDG-avid biopsy proven left  oropharyngeal/superior tonsilar recurrence.      Neck:  NI-RADS 3 -- FDG-avid left level IIa lymph node. This is  highly worrisome for a recurrent metastatic node.     2. Please refer to the whole body PET CT performed as a separate  report for the findings of the remainder of the body.    CECT Surveillance Legend:    Primary  1: No evidence of recurrence: routine surveillance  2: Low suspicion    a) Superficial abnormality (skin, mucosal surface): direct visual  inspection    b) Ill-defined deep abnormality: short interval follow-up* or PET  3: High suspicion (new or enlarging discrete nodule/mass): biopsy  4: Definitive recurrence (path proven or clinical progression): no  biopsy needed    Nodes  1: No evidence of recurrence: routine surveillance  2: Low suspicion (ill-defined): short interval follow-up or PET  3: High suspicion (new or enlarging lymph node): biopsy if clinically  needed  4: Definitive recurrence (path proven or clinical progression): no  biopsy needed    *short interval follow-up: 3 months at our  institution    I have personally reviewed the examination and initial interpretation  and I agree with the findings.    JUDI BORREGO MD         SYSTEM ID:  [id number]     Imaging was personally reviewed and interpreted by me

## 2022-09-14 NOTE — PROGRESS NOTES
Patient tolerated recovery stage well. VSS, right back biopsy site clean/dry/intact, no hematoma, and denies pain. Patient tolerated PO food and fluids. Teaching was done and discharge instructions were given.Dr Lawrence and Dr Corea  Both came to evaluate site and gave the OK for pt to go home. Patient ambulated, voided, and PIV removed. Patient discharged from the hospital via wheel chair to home with wife.

## 2022-09-14 NOTE — PRE-PROCEDURE
GENERAL PRE-PROCEDURE:   Procedure:  Right posterior 7th rib biopsy, possible chest tube placement.   Date/Time:  9/14/2022 11:25 AM    Written consent obtained?: Yes    Risks and benefits: Risks, benefits and alternatives were discussed    Consent given by:  Patient  Patient states understanding of procedure being performed: Yes    Patient's understanding of procedure matches consent: Yes    Procedure consent matches procedure scheduled: Yes    Expected level of sedation:  Moderate  Appropriately NPO:  Yes  ASA Class:  2  Mallampati  :  Grade 2- soft palate, base of uvula, tonsillar pillars, and portion of posterior pharyngeal wall visible  Lungs:  Lungs clear with good breath sounds bilaterally  Heart:  Normal heart sounds and rate  History & Physical reviewed:  History and physical reviewed and no updates needed  Statement of review:  I have reviewed the lab findings, diagnostic data, medications, and the plan for sedation

## 2022-09-14 NOTE — PROCEDURES
[facility]    Procedure: Right 7th rib biopsy    Date/Time: 9/14/2022 12:30 PM  Performed by: Dhiraj Newberry  Authorized by: Dhiraj Newberry Fellow Physician: Coni  Other(s) attending procedure: Howard      UNIVERSAL PROTOCOL   Site Marked: NA  Prior Images Obtained and Reviewed:  Yes  Required items: Required blood products, implants, devices and special equipment available    Patient identity confirmed:  Verbally with patient, arm band, provided demographic data and hospital-assigned identification number  Patient was reevaluated immediately before administering moderate or deep sedation or anesthesia  Confirmation Checklist:  Patient's identity using two indicators, relevant allergies, procedure was appropriate and matched the consent or emergent situation and correct equipment/implants were available  Time out: Immediately prior to the procedure a time out was called    Universal Protocol: the Joint Commission Universal Protocol was followed    Preparation: Patient was prepped and draped in usual sterile fashion    ESBL (mL):  0     ANESTHESIA    Anesthesia: Local infiltration  Local Anesthetic:  Lidocaine 1% without epinephrine  Anesthetic Total (mL):  5      SEDATION  Patient Sedated: Yes    Sedation Type:  Moderate (conscious) sedation  Sedation:  Fentanyl and midazolam  Vital signs: Vital signs monitored during sedation    See dictated procedure note for full details.  Findings: Left rib lesion    Specimens: core needle biopsy specimens sent for pathological analysis    Complications: None    Condition: Stable    Plan: Bedrest 1 hour.       PROCEDURE  Describe Procedure: Right 7th rib biopsy. 1 core bone sample taken and submitted in formalin.   Patient Tolerance:  Patient tolerated the procedure well with no immediate complications  Length of time physician/provider present for 1:1 monitoring during sedation: 20

## 2022-09-14 NOTE — DISCHARGE INSTRUCTIONS
[facility]    Interventional Radiology  Patient Instructions Following Bone Biopsy    AFTER YOU GO HOME  If you were given sedation DO NOT drive or operate machinery at home or at work for at least 24 hours  DO relax and take it easy for 48 hours, no strenuous activity for 24 hours  DO drink plenty of fluids  DO resume your regular diet, unless otherwise instructed by your Primary Physician  Keep the dressing dry and in place for 24 hours.  DO NOT SMOKE FOR AT LEAST 24 HOURS, if you have been given any medications that were to help you relax or sedate you during your procedure  DO NOT drink alcoholic beverages the day of your procedure  DO NOT do any strenuous exercise or lifting (> 10 lbs) for at least 3 days following your procedure  DO NOT take a bath or shower for at least 12 hours following your procedure  Remove dressing after shower the next day. Replace with Band aid for 2 days.  Never leave a wet dressing in place.  DO NOT make any important or legal decisions for 24 hours following your procedure  There should be minimum drainage from the biopsy site    CALL THE PHYSICIAN IF:  You start bleeding from the procedure site.  If you do start to bleed from that site, hold pressure on the site for a minimum of 10 minutes.  Your physician will tell you if you need to return to the hospital  You develop nausea or vomiting  You have excessive swelling, redness, or tenderness at the site  You have drainage that looks like it is infected.  You experience severe pain  You develop hives or a rash or unexplained itching  You develop shortness of breath  You develop a temperature of 101 degrees F or greater    ADDITIONAL INSTRUCTIONS: None    [facility] INTERVENTIONAL RADIOLOGY DEPARTMENT  Procedure Physician:         Dr. Newberry          Date of procedure: September 14, 2022  Telephone Numbers: [phone]      Monday-Friday 7:30 am to 4:00 pm  [phone] After 4:00 pm Monday-Friday, Weekends & Holidays.    Ask for the Interventional Radiologist on call.  Someone is on call 24 hrs/day  [facility] toll free number: [phone] Monday-Friday 8:00 am to 4:30 pm  [facility] Emergency Dept: [phone]

## 2022-09-15 ENCOUNTER — VIRTUAL VISIT (OUTPATIENT)
Dept: ONCOLOGY | Facility: CLINIC | Age: 72
End: 2022-09-15
Attending: INTERNAL MEDICINE
Payer: MEDICARE

## 2022-09-15 VITALS — WEIGHT: 195 LBS | BODY MASS INDEX: 29.55 KG/M2 | HEIGHT: 68 IN

## 2022-09-15 DIAGNOSIS — C09.9 SQUAMOUS CELL CARCINOMA OF LEFT TONSIL (H): Primary | ICD-10-CM

## 2022-09-15 PROCEDURE — 99215 OFFICE O/P EST HI 40 MIN: CPT | Mod: 95 | Performed by: INTERNAL MEDICINE

## 2022-09-15 PROCEDURE — G0463 HOSPITAL OUTPT CLINIC VISIT: HCPCS | Mod: PN,RTG | Performed by: INTERNAL MEDICINE

## 2022-09-15 PROCEDURE — 99417 PROLNG OP E/M EACH 15 MIN: CPT | Mod: 95 | Performed by: INTERNAL MEDICINE

## 2022-09-15 ASSESSMENT — PAIN SCALES - GENERAL: PAINLEVEL: NO PAIN (0)

## 2022-09-15 NOTE — LETTER
9/15/2022         RE: Quan Murphy  [address]  [facility] [address]        Dear Colleague,    Thank you for referring your patient, Quan Murphy, to the [facility]. Please see a copy of my visit note below.    New is a 72 year old who is being evaluated via a billable video visit.      How would you like to obtain your AVS? MyChart  If the video visit is dropped, the invitation should be resent by: Send to e-mail at: [email or web address]  Will anyone else be joining your video visit? Tiff Bloom    Video-Visit Details  Video Start Time: 8:15 AM  Type of service:  Video Visit  Video End Time:9:05 AM  Originating Location (pt. Location): Home  Distant Location (provider location):  [facility]   Platform used for Video Visit: [facility]    PATIENT NAME: Quan Murphy  MRN # [id number]   DATE OF VISIT: September 15, 2022  YOB: 1950     Referring Provider: Dr. Ligia Fang, Otolaryngology  PCP: Dr. Jose Hernandez, FP in [place]    CANCER TYPE: SCC L tonsil, p16 +gardenia  STAGE: cT2N2 (II)  ECOG PS: 1    PD-L1: TPS on [id number] 90%  NGS: N/A    SUMMARY  3/22/21 L tonsil bx in clinic (Dr. Christensen). Path: SCC, non keratinizing, p16 +gardenia  3/23/21 CT neck. 2.9 x 2.7 x 3.5 cm L tonsil fullness involving soft palate, level 2-3 neck nodes  4/2/21 PET/CT. 2.7 x 2.2 x 2.7 cm L palatine tonsil mass (SUV 24), extension to oral cavity, 3.4 x 2.0 cm L level 4 node invading SCM, 1.8 x 1.8 cm L level 2A/3 node (SUV 7.9) w/ECS, 1.4 x 0.9 cm R level 2A node (SUV 6.7)  4/28~6/12/21  Chemoradiation with weekly cisplatin. No HD cisplatin due to CKD. Cisplatin held 6/2/21 due to malnutrition, SURESH  5/21/21 [facility] ED for fever to 102. No localizing source, cultures negative, not neutropenic  6/2~6/9/21 [facility] for malnutrition, inability to tolerate PO, SURESH. C/b respiratory arrest due to opioids, aspiration pneumonia,  anxiety  8/19/22 CT neck. 3 month post PET/CT follow up, early due to indeterminate note on PET/CT. 1.4 x 1.5 x 1.5 cm lesion L posterolateral oropharyngeal mucosa, similar enhancing necrotic L level IIA nodes up to 1 cm.   8/19/22 L tonsil bx in clinic. Path: SCC, p16 +gardenia. TPS 90%   8/30/22 PET/CT. 12 mm L superior tonsil/oropharynx lesion (SUV 17.1), 11 mm L level 2A node (SUV 10.1), more solid than on pror CTs. New sclerotic lesion R 7th rib (SV 10.5), few patchy posterior RUL opacities largest 10 mm (SUV 3.9), mildly avid 8 mm R hilar node (SUV 4.2)  9/14/22 R rib bx     ASSESSMENT AND PLAN   SCC L tonsil, [id number], p16 +gardenia, SHAYNE +gardenia, TPS 90%: He has a lot of hesitation about surgery due to the risk of swallowing dysfunction. He was in the pre-op area 9/6 for scheduled surgery and opted to delay surgery after discussing again with Dr. Fang, and undergo the rib bx first. He asked questions about surgery, which I encouraged him to discuss with Dr. Fang. We discussed the alternative of systemic therapy, especially in light of most likely early metastatic disease. Bone bx is pending but even if negative, the FDG avidity is high enough that I would still be very suspicious of a met. We discussed goals of treatment, which with a metastatic cancer, is palliative and not curative in nature, therefore the balance between QOL and toxicities changes. However, we ttalked about being aggressive in the setting of minimal oligometastatic disease, but that of course, we still needed to consider the cancer cells we wouldn't be able to visualize as well the radiographically visible disease. We also discussed the rationale for aggressive control of the local recurrence due to the morbidity associated with progression. TPS is high, so we could justify pembrolizumab alone. However, response would be improved by adding carboplatin and 5FU to the pembro, which would make sense if we're being as aggressive as possible. Carboplatin  and pembrolizumab are given in the infusion center once every 3 weeks, and 5FU is given continuously over 4 days once every 3 weeks. We would do 2 cycles and restage, completing 4 cycles total in the absence of toxicities, intolerance or progression, before going on to maintenance pembrolizumab. If the cancer responded well, we could still consider aggressive management of the local recurrence with surgery, and/or SBRT to the rib lesion. We discussed the longer term outcomes from the keynote 048 study, in terms of response, proportion of people alive at 1, 2, etc., years out, and median survival. We talked about the downside of systemic therapy in the sense that if the cancer grew, we might lose our window of opportunity for more aggressive local control without the risk of more substantial impact on QOL. He indicated that he would not be interested in a Gtube even down the road, due to the potential impact on QOL. I tried to explore that a little bit more and it would really be the inability to enjoy food that would be most impactful. He expressed an interest in a second opinion. [facility] would be the nearest tertiary referral center. Discussed that they would likely recommend surgery and I encouraged him to ask questions and discuss thoroughly the potential impact on swallowing and QOL if he went that route. However, a second opinion is a good idea in that there is a clinical trial using an HPV-directed vaccine + pembrolizumab. I would think it's still open. At the end of our long discussion, he was not sure of what he wanted to do but will call [facility] today to request the second opinion appt. I have a referral order in just in case he needs it. Will follow up with him in 2 weeks. I encouraged him not to delay more than that if he was still considering surgery as that would already be a month after the last imaging and things could change in the interim with regards to feasibility or the risk of dysphagia. Will  also await bone bx.     Hypothyroidism: Levothyroxine     CKD: Baseline ~1.1-1.2, a little higher on the quick tests done prior to CT.    80 minutes spent on the date of the encounter doing chart review, history and exam, documentation and further activities per the note     Shanthi Schrader MD  Associate Professor of Medicine  Hematology, Oncology and Transplantation      SUBJECTIVE  New returns for follow up of recurrent SCC L tonsil. Dr. Fang had discussed surgery but he has significant hesitations due to the risk of dysphagia. He's physically doing ok - no major changes since the last visit with me.     PAST MEDICAL HISTORY  SCC as above  ASCVD. Angina in 12/2016. [facility] with proximal LAD stenosis, s/p PTCA and stent. Not on metoprolol due to hypotension. Ses Dr. Aramis Ch, Cardiologist.  CKD baseline ~ 1.2-1.3, up to 1.93 on 11/2/20  HTN  Dyslipidemia  H/o prostate ca s/p robotic prostatectomy about 16 years ago followed by Dr. Meir Torres at [facility] in [place]  Nephrolithasis.   Bladder stones. S/p litholapaxy of 2 stones 2/16/22 (Dr. Rogers)  Anxiety, insomnia. Takes zolpidem and alprazolam nightly   OA  Chronic sinusitus. Takes chronic hydrocodone-acetaminophen for this  Inguinal hernia  GERD, hiatal hernia. Met with Dr. Adams 2/18/20, symptoms not due to hiatal hernia  ORIF R radius 2017  H/o retroperitoneal hematoma 10/2017. Admitted to [facility]. [place] to be related to lithotripsy 10 days prior  Hemorrhoids  PAD s/p R iliac artery stent after injury during [facility]    CURRENT OUTPATIENT MEDICATIONS  Current Outpatient Medications   Medication Sig Dispense Refill     ALPRAZolam (XANAX) 0.5 MG tablet Take 1 tablet (0.5 mg) by mouth 4 times daily as needed for anxiety 120 tablet 0     buPROPion (WELLBUTRIN XL) 150 MG 24 hr tablet Take 1 tablet (150 mg) by mouth every morning 30 tablet 1     cetirizine (ZYRTEC) 10 MG tablet Take 10 mg by mouth daily       cevimeline (EVOXAC) 30 MG capsule Take 1 capsule  (30 mg) by mouth 3 times daily 120 capsule 11     citalopram (CELEXA) 40 MG tablet Take 1 tablet (40 mg) by mouth daily (Patient taking differently: Take 40 mg by mouth At Bedtime) 90 tablet 1     dronabinol (MARINOL) 2.5 MG capsule Take 1 capsule (2.5 mg) by mouth 2 times daily (before meals) 28 capsule 3     HYDROcodone-acetaminophen (NORCO)  MG per tablet Take 1 tablet by mouth 4 times daily as needed for severe pain 120 tablet 0     levothyroxine (SYNTHROID/LEVOTHROID) 112 MCG tablet Take 1 tablet (112 mcg) by mouth daily 30 tablet 11     levothyroxine (SYNTHROID/LEVOTHROID) 75 MCG tablet Take 1 tablet (75 mcg) by mouth daily 30 tablet 11     metoprolol succinate ER (TOPROL-XL) 50 MG 24 hr tablet Take 1 tablet (50 mg) by mouth daily 90 tablet 2     omeprazole (PRILOSEC) 40 MG DR capsule Take 1 capsule (40 mg) by mouth daily 90 capsule 3     pilocarpine (SALAGEN) 5 MG tablet Take 1 tablet (5 mg) by mouth 3 times daily 90 tablet 3     rosuvastatin (CRESTOR) 40 MG tablet Take 1 tablet (40 mg) by mouth daily 90 tablet 3     zolpidem ER (AMBIEN CR) 12.5 MG CR tablet TAKE ONE TABLET BY MOUTH NIGHTLY AS NEEDED FOR SLEEP (Patient taking differently: TAKE ONE TABLET BY MOUTH NIGHTLY AS NEEDED FOR SLEEP  Currently taking every night 9/1/22) 30 tablet 3     nitroGLYcerin (NITROSTAT) 0.4 MG sublingual tablet For chest pain place 1 tablet under the tongue every 5 minutes for 3 doses. If symptoms persist 5 minutes after 1st dose call 911. (Patient not taking: No sig reported) 25 tablet 0     ALLERGIES  Allergies   Allergen Reactions     Animal Dander      Azithromycin Nausea and Vomiting     Dust Mites      Pollen Extract      Smoke.       REVIEW OF SYSTEMS  As above in the HPI, o/w complete 12-point ROS was negative.    PHYSICAL EXAM  No vitals due to video visit   GEN: NAD  HEENT: EOMI, no icterus, injection or pallor  NEURO: alert      Remainder of physical exam deferred due to public health emergency and limitations  of video visit.    LABORATORY AND IMAGING STUDIES   05/13/22 14:29 05/13/22 16:34 08/16/22 13:15 08/16/22 16:45 08/19/22 12:47 09/06/22 10:01 09/14/22 10:06   Creatinine POCT 1.6 (H)    1.4 (H)     GFR, ESTIMATED POCT 46 (L)    54 (L)     Albumin Urine mg/g Cr    9.52      Albumin Urine mg/L    6      Creatinine Urine    63      PSA   0.17       T4 Free  1.21 1.19       TSH  0.02 (L) 0.06 (L)       GLUCOSE BY METER POCT      93    WBC       3.5 (L)   Hemoglobin       12.8 (L)   Hematocrit       39.6 (L)   Platelet Count       162   RBC Count       4.36 (L)   MCV       91   MCH       29.4   MCHC       32.3   RDW       14.2   INR       0.98     Labs were independently reviewed and interpreted by me    CT Soft Tissue Neck w Contrast  Narrative: PET CT fusion examination 8/30/2022 12:39 PM  1. Neck CT with contrast  2. PET study of the neck  3. PET CT fusion study of the neck  INDICATION: Squamous cell carcinoma of tonsil (H).    ADDITIONAL INFORMATION OBTAINED FROM EMR: 72-year-old male with  history of squamous cell carcinoma of the left tonsil status post  chemoradiation with recently diagnosed left superior tonsil/soft  palate recurrence not yet treated.    COMPARISON: Same-day PET CT, Neck CT 8/19/2022 and 5/13/2022, chest CT  5/13/2022, PET/CT 9/3/2021    Technique: Please refer to the accompanying whole body PET-CT for  report of the dose and whole body PET-CT findings.  Regarding the neck, axial images were obtained after nonionic  intravenous contrast administration, with sagittal and coronal  reconstructions performed. Neck CT images were reviewed in bone, soft  tissue, and lung windows, with review of the fused PET-CT images as  well in multiple planes.    Findings:  FDG avid approximately 12 mm soft tissue lesion in the left  oropharynx/superior tonsil with max SUV 17.1 (series 6 image 61),  likely corresponding to the previous peripheral enhancing lesion seen  on 8/19/2022 with interval biopsy.     FDG avid  11 mm left level 2A lymph node (series 6 image 74) with Max  SUV 10.1. This node has become more solid on CT when compared with  prior CTs.      Stable posttreatment changes of the neck.    Evaluation of the remaining mucosal space demonstrates no abnormality  or abnormal metabolic uptake on PET CT in the nasopharynx, hypopharynx  or the glottis. The tongue base otherwise appears normal. The major  salivary glands and thyroid gland appear within normal limits.    Limited evaluation of the cervical vertebral column demonstrates  similar spondylosis with scattered neural foraminal narrowing without  high-grade spinal canal stenosis. Mild mucosal thickening in the right  maxillary sinus. The visualized paranasal sinuses and mastoid air  cells are otherwise clear. Atrophic/atretic left internal jugular  vein. The remaining major vascular structures in the neck are patent.  Atherosclerosis of the carotid bulbs. Left frontal DVA.  Impression: Impression:   In this patient with history of squamous cell carcinoma of the left  tonsil with recently diagnosed recurrence not yet treated:    1. Primary: NI-RADS 4 -- FDG-avid biopsy proven left  oropharyngeal/superior tonsilar recurrence.      Neck:  NI-RADS 3 -- FDG-avid left level IIa lymph node. This is  highly worrisome for a recurrent metastatic node.     2. Please refer to the whole body PET CT performed as a separate  report for the findings of the remainder of the body.    CECT Surveillance Legend:    Primary  1: No evidence of recurrence: routine surveillance  2: Low suspicion    a) Superficial abnormality (skin, mucosal surface): direct visual  inspection    b) Ill-defined deep abnormality: short interval follow-up* or PET  3: High suspicion (new or enlarging discrete nodule/mass): biopsy  4: Definitive recurrence (path proven or clinical progression): no  biopsy needed    Nodes  1: No evidence of recurrence: routine surveillance  2: Low suspicion (ill-defined): short  interval follow-up or PET  3: High suspicion (new or enlarging lymph node): biopsy if clinically  needed  4: Definitive recurrence (path proven or clinical progression): no  biopsy needed    *short interval follow-up: 3 months at our institution    I have personally reviewed the examination and initial interpretation  and I agree with the findings.    JUDI BORREGO MD      SYSTEM ID:  [id number]     Imaging was personally reviewed and interpreted by me         Again, thank you for allowing me to participate in the care of your patient.      Sincerely,    Shanthi Schrader MD

## 2022-09-15 NOTE — PROGRESS NOTES
"New is a 72 year old who is being evaluated via a billable video visit.      How would you like to obtain your AVS? MyChart  If the video visit is dropped, the invitation should be resent by: Send to e-mail at: [email or web address]  Will anyone else be joining your video visit? No  {If patient encounters technical issues they should call [phone] :[id number]}  Winston Bloom      Video-Visit Details    Video Start Time: {video visit start/end time for provider to select:152948}    Type of service:  Video Visit    Video End Time:{video visit start/end time for provider to select:152948}    Originating Location (pt. Location): {video visit patient location:963574::\"Home\"}    Distant Location (provider location):  [facility]     Platform used for Video Visit: {Virtual Visit Platforms:806235::\"AmWell\"}  "

## 2022-09-16 ENCOUNTER — VIRTUAL VISIT (OUTPATIENT)
Dept: PALLIATIVE CARE | Facility: CLINIC | Age: 72
End: 2022-09-16
Attending: INTERNAL MEDICINE
Payer: MEDICARE

## 2022-09-16 DIAGNOSIS — Z71.89 ADVANCE CARE PLANNING: ICD-10-CM

## 2022-09-16 DIAGNOSIS — C09.9 SQUAMOUS CELL CARCINOMA OF LEFT TONSIL (H): Primary | ICD-10-CM

## 2022-09-16 PROCEDURE — 99417 PROLNG OP E/M EACH 15 MIN: CPT | Performed by: INTERNAL MEDICINE

## 2022-09-16 PROCEDURE — 99215 OFFICE O/P EST HI 40 MIN: CPT | Mod: 95 | Performed by: INTERNAL MEDICINE

## 2022-09-16 PROCEDURE — G0463 HOSPITAL OUTPT CLINIC VISIT: HCPCS | Mod: PN,RTG | Performed by: INTERNAL MEDICINE

## 2022-09-16 NOTE — PROGRESS NOTES
Palliative Care Outpatient Clinic      Patient ID:  Medical - He has SCC L tonsil dx 3/2021 [id number] p16+  Definitive chemoradiotherapy 4-6/2021 6/2/2021 admitted with FTT/urgent need for GT placement and had a respiratory arrest 2 d later inpatient thought to be drug-induced. Got PEG and was discharged.  6/12/2021 radiation end date.  9/3/2021 eval showed CHANDA but has continued L tongue/pharynx mucositis seen on visual exam and PET  9/2021 he transferred his pain care back to PCP   8/2022 new L neck disease, bx confirmed recurrent SCC; initial plan was for resection but PET showed R 7th rib lesion-->bx 9/14/2022     He has CKD and chronic pain (headaches/sinus pain 'post nasal drainage from allergies') on long-term hydrocodone therapy (#120 hydrocodone 10 mg/APAP tabs a month).  He had much dysphoria with methadone in 2021 with his chemoradiation  On ambien and alprazolam for insomnia/anxiety.     Social - Lives with wife. 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   +HCD on chart; names hCeri Carroll his wife as his primary agent.     Care Planning - discussed prognosis and tx goals 9/2022 palliative visit.     Opioid Safety - +naloxone  See 5/14/2021 opioid risk/safety discussion; I consider him higher risk due to long history of higher risk polysubstance use (opioids, benzos, alcohol), hx of inadvertent overdose (hospital 6/2021), self-titration of meds, anxiety.     History:  History gathered today from: patient, medical chart    I saw him last a year ago.  Dx with recurrence locally, perhaps with rib mets (bx results pending) just in the last ~month.  He is grappling with the cancer recurring; probably incurable; heard survival may only be a year or so; in a state of shock he observes. Reviewed with him in detail his situation which is uncertain and complicated although it seems very likely that he does have metastatic dz. He understands that even with  metastatic disease, focusing on control of local (oropharyngeal disease) may yet be a priority for his treatment as folks with his cancer typically die from complications of local (head/neck disease) and I wonder if that will involve offers of resecting his neck disease.     He is really worried about losing ability to swallow if he proceeds with surgery, being forced to have a GT, he really doesn't want this, and worries. He is clear he does not want more time at any cost. We discussed this a lot; he's getting a 2nd opinion at [facility].    He has long standing chronic sinus pain and takes ~40 mg hydrocodone a day for this.  His neck recurrence and rib met are not currently painful.  On qid xanax.      PE: There were no vitals taken for this visit.   Wt Readings from Last 3 Encounters:   09/15/22 88.5 kg (195 lb)   09/14/22 88.1 kg (194 lb 3.2 oz)   09/06/22 87.5 kg (192 lb 14.4 oz)     Alert NAD  Full affect  Voice sl hoarse      Data reviewed:  I reviewed recent labs and imaging, my comments: Cr 1.4    PET shows post R 7th rib FDG avid lesion  1. Primary: NI-RADS 4 -- FDG-avid biopsy proven left  oropharyngeal/superior tonsilar recurrence.      Neck:  NI-RADS 3 -- FDG-avid left level IIa lymph node. This is  highly worrisome for a recurrent metastatic node.     [facility] database reviewed: #120 Vicodin 10s 8/31 monthly, #120 alprax 0.5 mg tabs monthly, #30 Ambiens a month 12.5 mg      Impression & Recommendations:  71 yo with recurrent and possibly metastatic H&N cancer; long-standing chronic facial pain on hydrocodone; anxiety on xanax; eval and treatment planning still ongoing; getting a 2nd opinion at [facility] soon    I had a long discussion with him about his situation, grief, probability of metastatic and incurable disease; answered his Qs about how we'd approach cancer related pain, nausea, etc.    Currently his cancer recurrence is not painful; d/w him when it does we'd probably switch him to another opioid (not  methadone--dysphoric on it last time) etc. I am ok taking over his pain care and helping with other palliative/supportive care needs going forward with him. If he transfers his onc care to [place] he may want to see their program too    Follow-up 1mo    70 minutes spent on the date of the encounter doing chart review, history and exam, patient education & counseling, documentation and other activities as noted above.    Thank you for involving us in the patient's care.   Elier Serrano MD / Palliative Medicine / Text me via [facility].

## 2022-09-16 NOTE — NURSING NOTE
Patient declined individual allergy and medication review by support staff because pt states everything was up-to-date during echeck-in.    Ann Barrera VF

## 2022-09-16 NOTE — LETTER
9/16/2022       RE: Quan Murphy  [address]  [facility] [address]     Dear Colleague,    Thank you for referring your patient, Quan Murphy, [facility] at [facility]. Please see a copy of my visit note below.    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 Got PEG and was discharged.  6/12/2021 radiation end date.  9/3/2021 eval showed CHANDA but has continued L tongue/pharynx mucositis seen on visual exam and PET  9/2021 he transferred his pain care back to PCP   8/2022 new L neck disease, bx confirmed recurrent SCC; initial plan was for resection but PET showed R 7th rib lesion-->bx 9/14/2022     He has CKD and chronic pain (headaches/sinus pain 'post nasal drainage from allergies') on long-term hydrocodone therapy (#120 hydrocodone 10 mg/APAP tabs a month).  He had much dysphoria with methadone in 2021 with his chemoradiation  On ambien and alprazolam for insomnia/anxiety.     Social - Lives with wife. 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   +HCD on chart; names Cheri Carroll his wife as his primary agent.     Care Planning - discussed prognosis and tx goals 9/2022 palliative visit.     Opioid Safety - +naloxone  See 5/14/2021 opioid risk/safety discussion; I consider him higher risk due to long history of higher risk polysubstance use (opioids, benzos, alcohol), hx of inadvertent overdose (hospital 6/2021), self-titration of meds, anxiety.     History:  History gathered today from: patient, medical chart    I saw him last a year ago.  Dx with recurrence locally, perhaps with rib mets (bx results pending) just in the last ~month.  He is  grappling with the cancer recurring; probably incurable; heard survival may only be a year or so; in a state of shock he observes. Reviewed with him in detail his situation which is uncertain and complicated although it seems very likely that he does have metastatic dz. He understands that even with metastatic disease, focusing on control of local (oropharyngeal disease) may yet be a priority for his treatment as folks with his cancer typically die from complications of local (head/neck disease) and I wonder if that will involve offers of resecting his neck disease.     He is really worried about losing ability to swallow if he proceeds with surgery, being forced to have a GT, he really doesn't want this, and worries. He is clear he does not want more time at any cost. We discussed this a lot; he's getting a 2nd opinion at [facility].    He has long standing chronic sinus pain and takes ~40 mg hydrocodone a day for this.  His neck recurrence and rib met are not currently painful.  On qid xanax.      PE: There were no vitals taken for this visit.   Wt Readings from Last 3 Encounters:   09/15/22 88.5 kg (195 lb)   09/14/22 88.1 kg (194 lb 3.2 oz)   09/06/22 87.5 kg (192 lb 14.4 oz)     Alert NAD  Full affect  Voice sl hoarse      Data reviewed:  I reviewed recent labs and imaging, my comments: Cr 1.4    PET shows post R 7th rib FDG avid lesion  1. Primary: NI-RADS 4 -- FDG-avid biopsy proven left  oropharyngeal/superior tonsilar recurrence.      Neck:  NI-RADS 3 -- FDG-avid left level IIa lymph node. This is  highly worrisome for a recurrent metastatic node.     [facility] database reviewed: #120 Vicodin 10s 8/31 monthly, #120 alprax 0.5 mg tabs monthly, #30 Ambiens a month 12.5 mg      Impression & Recommendations:  71 yo with recurrent and possibly metastatic H&N cancer; long-standing chronic facial pain on hydrocodone; anxiety on xanax; eval and treatment planning still ongoing; getting a 2nd opinion at [facility] soon    I had a  long discussion with him about his situation, grief, probability of metastatic and incurable disease; answered his Qs about how we'd approach cancer related pain, nausea, etc.    Currently his cancer recurrence is not painful; d/w him when it does we'd probably switch him to another opioid (not methadone--dysphoric on it last time) etc. I am ok taking over his pain care and helping with other palliative/supportive care needs going forward with him. If he transfers his onc care to [facility] he may want to see their program too    Follow-up 1mo    70 minutes spent on the date of the encounter doing chart review, history and exam, patient education & counseling, documentation and other activities as noted above.    Thank you for involving us in the patient's care.     Elier Serrano MD / Palliative Medicine / Text me via [facility].

## 2022-09-16 NOTE — PROGRESS NOTES
New is a 72 year old who is being evaluated via a billable video visit.      How would you like to obtain your AVS? MyChart  If the video visit is dropped, the invitation should be resent by: Text to cell phone: [phone]  Will anyone else be joining your video visit?       Ann CAMPOS    Video-Visit Details    Video Start Time: 425p    Type of service:  Video Visit    Video End Time:5:02 PM    Originating Location (pt. Location): Home    Distant Location (provider location):  [facility]     Platform used for Video Visit: [employer]

## 2022-09-19 LAB
PATH REPORT.COMMENTS IMP SPEC: ABNORMAL
PATH REPORT.COMMENTS IMP SPEC: ABNORMAL
PATH REPORT.COMMENTS IMP SPEC: YES
PATH REPORT.FINAL DX SPEC: ABNORMAL
PATH REPORT.GROSS SPEC: ABNORMAL
PATH REPORT.MICROSCOPIC SPEC OTHER STN: ABNORMAL
PATH REPORT.RELEVANT HX SPEC: ABNORMAL
PHOTO IMAGE: ABNORMAL

## 2022-09-19 PROCEDURE — 88311 DECALCIFY TISSUE: CPT | Mod: 26 | Performed by: PATHOLOGY

## 2022-09-19 PROCEDURE — 88307 TISSUE EXAM BY PATHOLOGIST: CPT | Mod: 26 | Performed by: PATHOLOGY

## 2022-09-22 ENCOUNTER — VIRTUAL VISIT (OUTPATIENT)
Dept: ONCOLOGY | Facility: CLINIC | Age: 72
End: 2022-09-22
Attending: INTERNAL MEDICINE
Payer: MEDICARE

## 2022-09-22 DIAGNOSIS — F41.9 ANXIETY: ICD-10-CM

## 2022-09-22 DIAGNOSIS — C09.9 SQUAMOUS CELL CARCINOMA OF LEFT TONSIL (H): Primary | ICD-10-CM

## 2022-09-22 PROCEDURE — 99215 OFFICE O/P EST HI 40 MIN: CPT | Mod: 95 | Performed by: INTERNAL MEDICINE

## 2022-09-22 PROCEDURE — G0463 HOSPITAL OUTPT CLINIC VISIT: HCPCS | Mod: PN,RTG | Performed by: INTERNAL MEDICINE

## 2022-09-22 NOTE — PROGRESS NOTES
"New is a 72 year old who is being evaluated via a billable video visit.      How would you like to obtain your AVS? MyChart  If the video visit is dropped, the invitation should be resent by: Send to e-mail at: [email or web address]  Will anyone else be joining your video visit? No  {If patient encounters technical issues they should call [phone] :[id number]}      Video-Visit Details    Video Start Time: {video visit start/end time for provider to select:143668}    Type of service:  Video Visit    Video End Time:{video visit start/end time for provider to select:686932}    Originating Location (pt. Location): {video visit patient location:366934::\"Home\"}    Distant Location (provider location):  [facility]     Platform used for Video Visit: {Virtual Visit Platforms:612600::\""[employer]Well\"}  "

## 2022-09-22 NOTE — LETTER
9/22/2022         RE: Quan Murphy  [address]  [facility] [address]        Dear Colleague,    Thank you for referring your patient, Quan Murphy, to the [facility]. Please see a copy of my visit note below.    New is a 72 year old who is being evaluated via a billable video visit.      How would you like to obtain your AVS? MyChart  If the video visit is dropped, the invitation should be resent by: Send to e-mail at: [email or web address]  Will anyone else be joining your video visit? Tiff Thomason    Video-Visit Details  Video Staart Time:  8:52 AM   Type of service:  Video Visit  Video End Time: 9;36 AM  Originating Location (pt. Location): Home  Distant Location (provider location):  [facility]   Platform used for Video Visit: [facility]    PATIENT NAME: Qaun Murphy  MRN # [id number]   DATE OF VISIT: September 21, 2022  YOB: 1950     Referring Provider: Dr. Ligia Fang, Otolaryngology  PCP: Dr. Jose Hernandez, FP in [place]    CANCER TYPE: SCC L tonsil, p16 +gardenia  STAGE: cT2N2 (II)  ECOG PS: 1    PD-L1: TPS on [id number] 90%  NGS: N/A    SUMMARY  3/22/21 L tonsil bx in clinic (Dr. Christensen). Path: SCC, non keratinizing, p16 +gardenia  3/23/21 CT neck. 2.9 x 2.7 x 3.5 cm L tonsil fullness involving soft palate, level 2-3 neck nodes  4/2/21 PET/CT. 2.7 x 2.2 x 2.7 cm L palatine tonsil mass (SUV 24), extension to oral cavity, 3.4 x 2.0 cm L level 4 node invading SCM, 1.8 x 1.8 cm L level 2A/3 node (SUV 7.9) w/ECS, 1.4 x 0.9 cm R level 2A node (SUV 6.7)  4/28~6/12/21  Chemoradiation with weekly cisplatin. No HD cisplatin due to CKD. Cisplatin held 6/2/21 due to malnutrition, SURESH  5/21/21 [facility] ED for fever to 102. No localizing source, cultures negative, not neutropenic  6/2~6/9/21 [facility] for malnutrition, inability to tolerate PO, SURESH. C/b respiratory arrest due to opioids, aspiration pneumonia,  anxiety  8/19/22 CT neck. 3 month post PET/CT follow up, early due to indeterminate note on PET/CT. 1.4 x 1.5 x 1.5 cm lesion L posterolateral oropharyngeal mucosa, similar enhancing necrotic L level IIA nodes up to 1 cm.   8/19/22 L tonsil bx in clinic. Path: SCC, p16 +gardenia. TPS 90%   8/30/22 PET/CT. 12 mm L superior tonsil/oropharynx lesion (SUV 17.1), 11 mm L level 2A node (SUV 10.1), more solid than on pror CTs. New sclerotic lesion R 7th rib (SV 10.5), few patchy posterior RUL opacities largest 10 mm (SUV 3.9), mildly avid 8 mm R hilar node (SUV 4.2)  9/14/22 R rib bx . Path: SCC    ASSESSMENT AND PLAN   SCC L tonsil, pH3O6A7, p16 +gardenia, SHAYNE +gardenia, TPS 90%: See my note from last week. We had another lengthy discussion today and made significant clarifications to what has been discussed with him since the recurrence was identified. He is under the impression that Dr. Fang was not recommending surgery, which we discussed was not the case. He was actually in the pre-op area on the day of surgery 2 weeks ago, and had another discussion with Dr. Fang while in pre-op and due to his uncertainty at the time about which way he wanted to proceed, and wanting to discuss systemic therapy options first, etc., the surgery was canceled. He was under the impression that I was not recommending surgery, which is also not the case. I clarified that we have two options and at the same time need to keep in mind the principles of what we're trying to accomplish in the setting of oligometastatic disease. One is to pursue surgery and achieve a good degree of local control since systemic therapy may not work and we may lose our window of opportunity to control the local recurrence, which in turn can cause a lot of morbidity in the future, including the inability to eat, which is what he wants to avoid the most. We would then come in with pembrolizumab afterward and likely SBRT to the rib as long as we had some period of disease  stability. The other option is to forego surgery now, primarily because of his concern about the potential risk of functional impairment, knowing that again, we may lose the window of opportunity for surgical options later but accept that and move forward. We briefly discussed the idea of adding chemo to pembrolizumab to optimize response, but I think it useful to focus on the higher level discussion at hand for the moment. He thought we were waiting for the rib bx results to decide what the best course moving forward was, but I clarified that we were working under the assumption that the rib was a met but that we wanted the bx because the principle is to do our best to pathologically prove metastatic disease. This discussion comes up because he really does not ever want a Gtube, even if it means he will not survive, because of the impact on his QOL, not being able to eat and enjoy food. Even the idea of a feeding tube post-operatively is really difficult for him. He asked again about what might happen if he chose not to treat the cancer, which we discussed again. He asked questions again about whether the soft palate could be reconstructed, which I defer to the surgeons.    He has an appt at [facility] for a second opinion in 1 week. I encouraged him to advocate for himself and ask lots of questions. Additionally, I again mentioned that there is a clinical trial there that may still be open using an HPV vaccine + ICI. If that is indeed still open and he's interested and able, I would recommend that over pembrolizumab alone if surgery were not chosen or presented. I explained that he would have to have everything done at [facility] if he were on a trial. He asked about a co-management type of system off trial, but I explained that pembrolizumab (+/- chemo - he has comorbidities which might preclude this and would need an echo before considering 5FU) is a standard of care that we all follow for this cancer, and that there really  isn't much deviation from that if people are adequate candidates. It's not necessary to have two specialists co-managing his treatment; rather, we look to each other for novel options that one has that the other doesn't (i.e., clinical trials), or if we're in a more difficult situation about which medications to use and want input, we can turn to our colleagues to solicit that input and I would certainly continue to advocate for him and reach out to resources and colleagues that I have to make sure we're considering the best options. As for second line or beyond, as we discussed last week, we have a S[facility] trial for HPV16 +gardenia SCCHN in people who are HLA-A 02* +gardenia.     After our long discussion, we will regroup once he's had his consultation at [facility] and go from there. He understands that a decision about which direction to go should be made after that visit due to the time limitations posed by a growing cancer.    As for our recommendation here, it really depends on whether or not he's going to accept or decline the possibility of a feeding tube and swallowing dysfunction for the shorter and longer term after surgery. If not, then we should proceed with systemic therapy.     Hypothyroidism: Levothyroxine, not discussed today    CKD: Baseline ~1.1-1.2, a little higher on the quick tests done prior to CT, not discussed again today    70 minutes spent on the date of the encounter doing chart review, history and exam, documentation and further activities per the note     Shanthi Schrader MD  Associate Professor of Medicine  Hematology, Oncology and Transplantation      SUBJECTIVE  New returns for follow up of recurrent SCC L tonsil. We met last week and had a long discussion. He MyCharted questions last week and I thought it would be most useful to talk again.   A little more phelgm/secretions over the last few weeks but o/w no change.     PAST MEDICAL HISTORY  SCC as above  ASCVD. Angina in 12/2016. [facility] with proximal  LAD stenosis, s/p PTCA and stent. Not on metoprolol due to hypotension. Ses Dr. Aramis Ch, Cardiologist.  CKD baseline ~ 1.2-1.3, up to 1.93 on 11/2/20  HTN  Dyslipidemia  H/o prostate ca s/p robotic prostatectomy about 16 years ago followed by Dr. Meir Torres at [facility] in [place]  Nephrolithasis.   Bladder stones. S/p litholapaxy of 2 stones 2/16/22 (Dr. Rogers)  Anxiety, insomnia. Takes zolpidem and alprazolam nightly   OA  Chronic sinusitus. Takes chronic hydrocodone-acetaminophen for this  Inguinal hernia  GERD, hiatal hernia. Met with Dr. Adams 2/18/20, symptoms not due to hiatal hernia  ORIF R radius 2017  H/o retroperitoneal hematoma 10/2017. Admitted to [facility]. [facility] to be related to lithotripsy 10 days prior  Hemorrhoids  PAD s/p R iliac artery stent after injury during [facility]    CURRENT OUTPATIENT MEDICATIONS  Current Outpatient Medications   Medication Sig Dispense Refill     ALPRAZolam (XANAX) 0.5 MG tablet Take 1 tablet (0.5 mg) by mouth 4 times daily as needed for anxiety 120 tablet 0     buPROPion (WELLBUTRIN XL) 150 MG 24 hr tablet Take 1 tablet (150 mg) by mouth every morning 30 tablet 1     cetirizine (ZYRTEC) 10 MG tablet Take 10 mg by mouth daily       cevimeline (EVOXAC) 30 MG capsule Take 1 capsule (30 mg) by mouth 3 times daily 120 capsule 11     citalopram (CELEXA) 40 MG tablet Take 1 tablet (40 mg) by mouth daily (Patient taking differently: Take 40 mg by mouth At Bedtime) 90 tablet 1     dronabinol (MARINOL) 2.5 MG capsule Take 1 capsule (2.5 mg) by mouth 2 times daily (before meals) 28 capsule 3     HYDROcodone-acetaminophen (NORCO)  MG per tablet Take 1 tablet by mouth 4 times daily as needed for severe pain 120 tablet 0     levothyroxine (SYNTHROID/LEVOTHROID) 112 MCG tablet Take 1 tablet (112 mcg) by mouth daily 30 tablet 11     levothyroxine (SYNTHROID/LEVOTHROID) 75 MCG tablet Take 1 tablet (75 mcg) by mouth daily 30 tablet 11     metoprolol succinate ER  (TOPROL-XL) 50 MG 24 hr tablet Take 1 tablet (50 mg) by mouth daily 90 tablet 2     nitroGLYcerin (NITROSTAT) 0.4 MG sublingual tablet For chest pain place 1 tablet under the tongue every 5 minutes for 3 doses. If symptoms persist 5 minutes after 1st dose call 911. (Patient not taking: No sig reported) 25 tablet 0     omeprazole (PRILOSEC) 40 MG DR capsule Take 1 capsule (40 mg) by mouth daily 90 capsule 3     pilocarpine (SALAGEN) 5 MG tablet Take 1 tablet (5 mg) by mouth 3 times daily 90 tablet 3     rosuvastatin (CRESTOR) 40 MG tablet Take 1 tablet (40 mg) by mouth daily 90 tablet 3     zolpidem ER (AMBIEN CR) 12.5 MG CR tablet TAKE ONE TABLET BY MOUTH NIGHTLY AS NEEDED FOR SLEEP (Patient taking differently: TAKE ONE TABLET BY MOUTH NIGHTLY AS NEEDED FOR SLEEP  Currently taking every night 9/1/22) 30 tablet 3     ALLERGIES  Allergies   Allergen Reactions     Animal Dander      Azithromycin Nausea and Vomiting     Dust Mites      Pollen Extract      Smoke.       REVIEW OF SYSTEMS  As above in the HPI, o/w complete 12-point ROS was negative.    PHYSICAL EXAM  There were no vitals taken for this visit.  GEN: NAD  HEENT: EOMI, no icterus, injection or pallor  NEURO: alert    Remainder of physical exam deferred due to public health emergency and limitations of video visit.    LABORATORY AND IMAGING STUDIES    No new labs or imaging  Path report from rib bx reviewed        Again, thank you for allowing me to participate in the care of your patient.      Sincerely,    Shanthi Schrader MD

## 2022-09-22 NOTE — PROGRESS NOTES
New is a 72 year old who is being evaluated via a billable video visit.      How would you like to obtain your AVS? MyChart  If the video visit is dropped, the invitation should be resent by: Send to e-mail at: [email or web address]  Will anyone else be joining your video visit? Tiff      Joyce Thomason    Video-Visit Details  Video Staart Time:  8:52 AM   Type of service:  Video Visit  Video End Time: 9;36 AM  Originating Location (pt. Location): Home  Distant Location (provider location):  [facility]   Platform used for Video Visit: [facility]    PATIENT NAME: Quan Murphy  MRN # [id number]   DATE OF VISIT: September 21, 2022  YOB: 1950     Referring Provider: Dr. Ligia Fang, Otolaryngology  PCP: Dr. Jose Hernandez, FP in [place]    CANCER TYPE: SCC L tonsil, p16 +gardenia  STAGE: cT2N2 (II)  ECOG PS: 1    PD-L1: TPS on [id number] 90%  NGS: N/A    SUMMARY  3/22/21 L tonsil bx in clinic (Dr. Christensen). Path: SCC, non keratinizing, p16 +gardenia  3/23/21 CT neck. 2.9 x 2.7 x 3.5 cm L tonsil fullness involving soft palate, level 2-3 neck nodes  4/2/21 PET/CT. 2.7 x 2.2 x 2.7 cm L palatine tonsil mass (SUV 24), extension to oral cavity, 3.4 x 2.0 cm L level 4 node invading SCM, 1.8 x 1.8 cm L level 2A/3 node (SUV 7.9) w/ECS, 1.4 x 0.9 cm R level 2A node (SUV 6.7)  4/28~6/12/21  Chemoradiation with weekly cisplatin. No HD cisplatin due to CKD. Cisplatin held 6/2/21 due to malnutrition, SURESH  5/21/21 [facility] ED for fever to 102. No localizing source, cultures negative, not neutropenic  6/2~6/9/21 [facility] for malnutrition, inability to tolerate PO, SURESH. C/b respiratory arrest due to opioids, aspiration pneumonia, anxiety  8/19/22 CT neck. 3 month post PET/CT follow up, early due to indeterminate note on PET/CT. 1.4 x 1.5 x 1.5 cm lesion L posterolateral oropharyngeal mucosa, similar enhancing necrotic L level IIA nodes up to 1 cm.   8/19/22 L tonsil bx in clinic.  Path: SCC, p16 +gardenia. TPS 90%   8/30/22 PET/CT. 12 mm L superior tonsil/oropharynx lesion (SUV 17.1), 11 mm L level 2A node (SUV 10.1), more solid than on pror CTs. New sclerotic lesion R 7th rib (SV 10.5), few patchy posterior RUL opacities largest 10 mm (SUV 3.9), mildly avid 8 mm R hilar node (SUV 4.2)  9/14/22 R rib bx . Path: SCC    ASSESSMENT AND PLAN   SCC L tonsil, [id number], p16 +gardenia, SHAYNE +gardenia, TPS 90%: See my note from last week. We had another lengthy discussion today and made significant clarifications to what has been discussed with him since the recurrence was identified. He is under the impression that Dr. Fang was not recommending surgery, which we discussed was not the case. He was actually in the pre-op area on the day of surgery 2 weeks ago, and had another discussion with Dr. Fang while in pre-op and due to his uncertainty at the time about which way he wanted to proceed, and wanting to discuss systemic therapy options first, etc., the surgery was canceled. He was under the impression that I was not recommending surgery, which is also not the case. I clarified that we have two options and at the same time need to keep in mind the principles of what we're trying to accomplish in the setting of oligometastatic disease. One is to pursue surgery and achieve a good degree of local control since systemic therapy may not work and we may lose our window of opportunity to control the local recurrence, which in turn can cause a lot of morbidity in the future, including the inability to eat, which is what he wants to avoid the most. We would then come in with pembrolizumab afterward and likely SBRT to the rib as long as we had some period of disease stability. The other option is to forego surgery now, primarily because of his concern about the potential risk of functional impairment, knowing that again, we may lose the window of opportunity for surgical options later but accept that and move forward. We  briefly discussed the idea of adding chemo to pembrolizumab to optimize response, but I think it useful to focus on the higher level discussion at hand for the moment. He thought we were waiting for the rib bx results to decide what the best course moving forward was, but I clarified that we were working under the assumption that the rib was a met but that we wanted the bx because the principle is to do our best to pathologically prove metastatic disease. This discussion comes up because he really does not ever want a Gtube, even if it means he will not survive, because of the impact on his QOL, not being able to eat and enjoy food. Even the idea of a feeding tube post-operatively is really difficult for him. He asked again about what might happen if he chose not to treat the cancer, which we discussed again. He asked questions again about whether the soft palate could be reconstructed, which I defer to the surgeons.    He has an appt at [facility] for a second opinion in 1 week. I encouraged him to advocate for himself and ask lots of questions. Additionally, I again mentioned that there is a clinical trial there that may still be open using an HPV vaccine + ICI. If that is indeed still open and he's interested and able, I would recommend that over pembrolizumab alone if surgery were not chosen or presented. I explained that he would have to have everything done at [facility] if he were on a trial. He asked about a co-management type of system off trial, but I explained that pembrolizumab (+/- chemo - he has comorbidities which might preclude this and would need an echo before considering 5FU) is a standard of care that we all follow for this cancer, and that there really isn't much deviation from that if people are adequate candidates. It's not necessary to have two specialists co-managing his treatment; rather, we look to each other for novel options that one has that the other doesn't (i.e., clinical trials), or if we're in  a more difficult situation about which medications to use and want input, we can turn to our colleagues to solicit that input and I would certainly continue to advocate for him and reach out to resources and colleagues that I have to make sure we're considering the best options. As for second line or beyond, as we discussed last week, we have a [facility] trial for HPV16 +gardenia SCCHN in people who are HLA-A 02* +gardenia.     After our long discussion, we will regroup once he's had his consultation at [facility] and go from there. He understands that a decision about which direction to go should be made after that visit due to the time limitations posed by a growing cancer.    As for our recommendation here, it really depends on whether or not he's going to accept or decline the possibility of a feeding tube and swallowing dysfunction for the shorter and longer term after surgery. If not, then we should proceed with systemic therapy.     Hypothyroidism: Levothyroxine, not discussed today    CKD: Baseline ~1.1-1.2, a little higher on the quick tests done prior to CT, not discussed again today    70 minutes spent on the date of the encounter doing chart review, history and exam, documentation and further activities per the note     Shanthi Schrader MD  Associate Professor of Medicine  Hematology, Oncology and Transplantation      SUBJECTIVE  New returns for follow up of recurrent SCC L tonsil. We met last week and had a long discussion. He MyCharted questions last week and I thought it would be most useful to talk again.   A little more phelgm/secretions over the last few weeks but o/w no change.     PAST MEDICAL HISTORY  SCC as above  ASCVD. Angina in 12/2016. [facility] with proximal LAD stenosis, s/p PTCA and stent. Not on metoprolol due to hypotension. Ses Dr. Aramis Ch, Cardiologist.  CKD baseline ~ 1.2-1.3, up to 1.93 on 11/2/20  HTN  Dyslipidemia  H/o prostate ca s/p robotic prostatectomy about 16 years ago followed by Dr. Worley  Brian at [facility] in [place]  Nephrolithasis.   Bladder stones. S/p litholapaxy of 2 stones 2/16/22 (Dr. Rogers)  Anxiety, insomnia. Takes zolpidem and alprazolam nightly   OA  Chronic sinusitus. Takes chronic hydrocodone-acetaminophen for this  Inguinal hernia  GERD, hiatal hernia. Met with Dr. Adams 2/18/20, symptoms not due to hiatal hernia  ORIF R radius 2017  H/o retroperitoneal hematoma 10/2017. Admitted to [facility]. Bureau to be related to lithotripsy 10 days prior  Hemorrhoids  PAD s/p R iliac artery stent after injury during [facility]    CURRENT OUTPATIENT MEDICATIONS  Current Outpatient Medications   Medication Sig Dispense Refill     ALPRAZolam (XANAX) 0.5 MG tablet Take 1 tablet (0.5 mg) by mouth 4 times daily as needed for anxiety 120 tablet 0     buPROPion (WELLBUTRIN XL) 150 MG 24 hr tablet Take 1 tablet (150 mg) by mouth every morning 30 tablet 1     cetirizine (ZYRTEC) 10 MG tablet Take 10 mg by mouth daily       cevimeline (EVOXAC) 30 MG capsule Take 1 capsule (30 mg) by mouth 3 times daily 120 capsule 11     citalopram (CELEXA) 40 MG tablet Take 1 tablet (40 mg) by mouth daily (Patient taking differently: Take 40 mg by mouth At Bedtime) 90 tablet 1     dronabinol (MARINOL) 2.5 MG capsule Take 1 capsule (2.5 mg) by mouth 2 times daily (before meals) 28 capsule 3     HYDROcodone-acetaminophen (NORCO)  MG per tablet Take 1 tablet by mouth 4 times daily as needed for severe pain 120 tablet 0     levothyroxine (SYNTHROID/LEVOTHROID) 112 MCG tablet Take 1 tablet (112 mcg) by mouth daily 30 tablet 11     levothyroxine (SYNTHROID/LEVOTHROID) 75 MCG tablet Take 1 tablet (75 mcg) by mouth daily 30 tablet 11     metoprolol succinate ER (TOPROL-XL) 50 MG 24 hr tablet Take 1 tablet (50 mg) by mouth daily 90 tablet 2     nitroGLYcerin (NITROSTAT) 0.4 MG sublingual tablet For chest pain place 1 tablet under the tongue every 5 minutes for 3 doses. If symptoms persist 5 minutes after 1st dose call 911.  (Patient not taking: No sig reported) 25 tablet 0     omeprazole (PRILOSEC) 40 MG DR capsule Take 1 capsule (40 mg) by mouth daily 90 capsule 3     pilocarpine (SALAGEN) 5 MG tablet Take 1 tablet (5 mg) by mouth 3 times daily 90 tablet 3     rosuvastatin (CRESTOR) 40 MG tablet Take 1 tablet (40 mg) by mouth daily 90 tablet 3     zolpidem ER (AMBIEN CR) 12.5 MG CR tablet TAKE ONE TABLET BY MOUTH NIGHTLY AS NEEDED FOR SLEEP (Patient taking differently: TAKE ONE TABLET BY MOUTH NIGHTLY AS NEEDED FOR SLEEP  Currently taking every night 9/1/22) 30 tablet 3     ALLERGIES  Allergies   Allergen Reactions     Animal Dander      Azithromycin Nausea and Vomiting     Dust Mites      Pollen Extract      Smoke.       REVIEW OF SYSTEMS  As above in the HPI, o/w complete 12-point ROS was negative.    PHYSICAL EXAM  There were no vitals taken for this visit.  GEN: NAD  HEENT: EOMI, no icterus, injection or pallor  NEURO: alert    Remainder of physical exam deferred due to public health emergency and limitations of video visit.    LABORATORY AND IMAGING STUDIES    No new labs or imaging  Path report from rib bx reviewed

## 2022-09-23 DIAGNOSIS — F41.9 ANXIETY: ICD-10-CM

## 2022-09-23 RX ORDER — ALPRAZOLAM 0.5 MG
0.5 TABLET ORAL 4 TIMES DAILY PRN
Qty: 120 TABLET | Refills: 0 | Status: SHIPPED | OUTPATIENT
Start: 2022-09-23 | End: 2022-10-22

## 2022-09-23 RX ORDER — ALPRAZOLAM 0.5 MG
0.5 TABLET ORAL 4 TIMES DAILY PRN
Qty: 120 TABLET | Refills: 0 | OUTPATIENT
Start: 2022-09-23

## 2022-09-23 NOTE — TELEPHONE ENCOUNTER
Pending Prescriptions:                       Disp   Refills    ALPRAZolam (XANAX) 0.5 MG tablet           120 ta*0        Sig: Take 1 tablet (0.5 mg) by mouth 4 times daily as           needed for anxiety    Routing refill request to provider for review/approval because:  Drug not on the [facility] refill protocol     Patient also asking if you could increase dose?  He has an upcoming appointment in [place] for his returning cancer and needs to cancel the visit on the 29th with you.    Requested Prescriptions   Pending Prescriptions Disp Refills    ALPRAZolam (XANAX) 0.5 MG tablet 120 tablet 0     Sig: Take 1 tablet (0.5 mg) by mouth 4 times daily as needed for anxiety        There is no refill protocol information for this order

## 2022-09-25 ENCOUNTER — MYC MEDICAL ADVICE (OUTPATIENT)
Dept: FAMILY MEDICINE | Facility: CLINIC | Age: 72
End: 2022-09-25

## 2022-09-26 DIAGNOSIS — R11.0 NAUSEA: Primary | ICD-10-CM

## 2022-09-26 DIAGNOSIS — G89.3 NEOPLASM RELATED PAIN: ICD-10-CM

## 2022-09-26 RX ORDER — ONDANSETRON 4 MG/1
4 TABLET, ORALLY DISINTEGRATING ORAL EVERY 8 HOURS PRN
Qty: 30 TABLET | Refills: 1 | Status: SHIPPED | OUTPATIENT
Start: 2022-09-26 | End: 2023-01-01

## 2022-09-26 RX ORDER — HYDROCODONE BITARTRATE AND ACETAMINOPHEN 10; 325 MG/1; MG/1
1 TABLET ORAL
Qty: 150 TABLET | Refills: 0 | Status: SHIPPED | OUTPATIENT
Start: 2022-09-26 | End: 2022-10-24

## 2022-10-09 ENCOUNTER — HEALTH MAINTENANCE LETTER (OUTPATIENT)
Age: 72
End: 2022-10-09

## 2022-10-18 DIAGNOSIS — G47.01 SLEEP DISORDER DUE TO A GENERAL MEDICAL CONDITION, INSOMNIA TYPE: ICD-10-CM

## 2022-10-18 NOTE — TELEPHONE ENCOUNTER
Requested Prescriptions   Pending Prescriptions Disp Refills     zolpidem ER (AMBIEN CR) 12.5 MG CR tablet [Pharmacy Med Name: ZOLPIDEM TARTRATE ER 12.5MG TBCR] 30 tablet 3     Sig: TAKE ONE TABLET BY MOUTH NIGHTLY AS NEEDED FOR SLEEP. MUST LAST 30 DAYS     Next 5 appointments (look out 90 days)    Nov 01, 2022  2:30 PM  Return Visit with Aramis Ch MD  [facility] ([facility] ) [address] [place] [id number] [phone]           Routing refill request to provider for review/approval because:  Drug not on the [facility] refill protocol or controlled substance

## 2022-10-19 RX ORDER — ZOLPIDEM TARTRATE 12.5 MG/1
TABLET, FILM COATED, EXTENDED RELEASE ORAL
Qty: 30 TABLET | Refills: 3 | Status: SHIPPED | OUTPATIENT
Start: 2022-10-19 | End: 2023-02-16

## 2022-10-20 ENCOUNTER — DOCUMENTATION ONLY (OUTPATIENT)
Dept: LAB | Facility: CLINIC | Age: 72
End: 2022-10-20

## 2022-10-20 ENCOUNTER — MYC MEDICAL ADVICE (OUTPATIENT)
Dept: FAMILY MEDICINE | Facility: CLINIC | Age: 72
End: 2022-10-20

## 2022-10-20 DIAGNOSIS — I10 HYPERTENSION GOAL BP (BLOOD PRESSURE) < 140/90: ICD-10-CM

## 2022-10-20 DIAGNOSIS — Z01.812 ENCOUNTER FOR PREPROCEDURE SCREENING LABORATORY TESTING FOR COVID-19: Primary | ICD-10-CM

## 2022-10-20 DIAGNOSIS — E78.5 HYPERLIPIDEMIA LDL GOAL <130: ICD-10-CM

## 2022-10-20 DIAGNOSIS — Z11.52 ENCOUNTER FOR PREPROCEDURE SCREENING LABORATORY TESTING FOR COVID-19: Primary | ICD-10-CM

## 2022-10-20 DIAGNOSIS — I25.110 ATHEROSCLEROSIS OF NATIVE CORONARY ARTERY OF NATIVE HEART WITH UNSTABLE ANGINA PECTORIS (H): ICD-10-CM

## 2022-10-20 NOTE — PROGRESS NOTES
Quan Murphy has an upcoming lab appointment:    Future Appointments   Date Time Provider Department Center   10/27/2022 10:30 AM PH LAB [facility]   10/27/2022 11:00 AM PHE[address]   11/1/2022  2:30 PM Aramis Ch MD [facility]   11/18/2022  2:00 PM Jose Hernandez MD [facility]   8/29/2023  1:00 PM PH LAB [facility]   8/30/2023  1:00 PM David Rogers MD [facility]     Patient is scheduled for the following lab(s): BMP, Lipid/ALT    There is no order available. Please review and place either future orders or HMPO (Review of Health Maintenance Protocol Orders), as appropriate.    Health Maintenance Due   Topic     LIPID      Reba Garcia

## 2022-10-22 ENCOUNTER — MYC REFILL (OUTPATIENT)
Dept: FAMILY MEDICINE | Facility: CLINIC | Age: 72
End: 2022-10-22

## 2022-10-22 DIAGNOSIS — F41.9 ANXIETY: ICD-10-CM

## 2022-10-23 ENCOUNTER — HOSPITAL ENCOUNTER (EMERGENCY)
Facility: CLINIC | Age: 72
Discharge: HOME | End: 2022-10-23
Attending: EMERGENCY MEDICINE | Admitting: EMERGENCY MEDICINE
Payer: MEDICARE

## 2022-10-23 ENCOUNTER — APPOINTMENT (OUTPATIENT)
Dept: CT IMAGING | Facility: CLINIC | Age: 72
End: 2022-10-23
Attending: EMERGENCY MEDICINE
Payer: MEDICARE

## 2022-10-23 ENCOUNTER — MYC MEDICAL ADVICE (OUTPATIENT)
Dept: FAMILY MEDICINE | Facility: CLINIC | Age: 72
End: 2022-10-23

## 2022-10-23 VITALS
HEART RATE: 69 BPM | RESPIRATION RATE: 18 BRPM | SYSTOLIC BLOOD PRESSURE: 122 MMHG | WEIGHT: 197.7 LBS | OXYGEN SATURATION: 98 % | DIASTOLIC BLOOD PRESSURE: 83 MMHG | BODY MASS INDEX: 30.06 KG/M2 | TEMPERATURE: 98.5 F

## 2022-10-23 DIAGNOSIS — R19.7 DIARRHEA OF PRESUMED INFECTIOUS ORIGIN: ICD-10-CM

## 2022-10-23 DIAGNOSIS — E86.0 DEHYDRATION: ICD-10-CM

## 2022-10-23 DIAGNOSIS — C09.9 SQUAMOUS CELL CARCINOMA OF TONSIL (H): ICD-10-CM

## 2022-10-23 DIAGNOSIS — G89.3 NEOPLASM RELATED PAIN: ICD-10-CM

## 2022-10-23 LAB
ALBUMIN SERPL-MCNC: 3 G/DL (ref 3.4–5)
ALBUMIN UR-MCNC: NEGATIVE MG/DL
ALP SERPL-CCNC: 82 U/L (ref 40–150)
ALT SERPL W P-5'-P-CCNC: 42 U/L (ref 0–70)
ANION GAP SERPL CALCULATED.3IONS-SCNC: 2 MMOL/L (ref 3–14)
APPEARANCE UR: CLEAR
AST SERPL W P-5'-P-CCNC: 41 U/L (ref 0–45)
BASOPHILS # BLD AUTO: 0 10E3/UL (ref 0–0.2)
BASOPHILS NFR BLD AUTO: 0 %
BILIRUB SERPL-MCNC: 0.5 MG/DL (ref 0.2–1.3)
BILIRUB UR QL STRIP: NEGATIVE
BUN SERPL-MCNC: 19 MG/DL (ref 7–30)
C DIFF TOX B STL QL: NEGATIVE
CALCIUM SERPL-MCNC: 8.9 MG/DL (ref 8.5–10.1)
CHLORIDE BLD-SCNC: 110 MMOL/L (ref 94–109)
CO2 SERPL-SCNC: 26 MMOL/L (ref 20–32)
COLOR UR AUTO: YELLOW
CREAT SERPL-MCNC: 1.27 MG/DL (ref 0.66–1.25)
CRP SERPL-MCNC: 20.4 MG/L (ref 0–8)
EOSINOPHIL # BLD AUTO: 0.1 10E3/UL (ref 0–0.7)
EOSINOPHIL NFR BLD AUTO: 5 %
ERYTHROCYTE [DISTWIDTH] IN BLOOD BY AUTOMATED COUNT: 13.7 % (ref 10–15)
FLUAV RNA SPEC QL NAA+PROBE: NEGATIVE
FLUBV RNA RESP QL NAA+PROBE: NEGATIVE
GFR SERPL CREATININE-BSD FRML MDRD: 60 ML/MIN/1.73M2
GLUCOSE BLD-MCNC: 106 MG/DL (ref 70–99)
GLUCOSE UR STRIP-MCNC: NEGATIVE MG/DL
HCT VFR BLD AUTO: 39 % (ref 40–53)
HGB BLD-MCNC: 13.3 G/DL (ref 13.3–17.7)
HGB UR QL STRIP: NEGATIVE
HOLD SPECIMEN: NORMAL
HYALINE CASTS: 1 /LPF
IMM GRANULOCYTES # BLD: 0 10E3/UL
IMM GRANULOCYTES NFR BLD: 1 %
KETONES UR STRIP-MCNC: NEGATIVE MG/DL
LACTATE SERPL-SCNC: 1 MMOL/L (ref 0.7–2)
LEUKOCYTE ESTERASE UR QL STRIP: NEGATIVE
LIPASE SERPL-CCNC: 157 U/L (ref 73–393)
LYMPHOCYTES # BLD AUTO: 0.3 10E3/UL (ref 0.8–5.3)
LYMPHOCYTES NFR BLD AUTO: 12 %
MCH RBC QN AUTO: 30.8 PG (ref 26.5–33)
MCHC RBC AUTO-ENTMCNC: 34.1 G/DL (ref 31.5–36.5)
MCV RBC AUTO: 90 FL (ref 78–100)
MONOCYTES # BLD AUTO: 0.1 10E3/UL (ref 0–1.3)
MONOCYTES NFR BLD AUTO: 5 %
MUCOUS THREADS #/AREA URNS LPF: PRESENT /LPF
NEUTROPHILS # BLD AUTO: 1.8 10E3/UL (ref 1.6–8.3)
NEUTROPHILS NFR BLD AUTO: 77 %
NITRATE UR QL: NEGATIVE
NRBC # BLD AUTO: 0 10E3/UL
NRBC BLD AUTO-RTO: 0 /100
PH UR STRIP: 5 [PH] (ref 5–7)
PLATELET # BLD AUTO: 121 10E3/UL (ref 150–450)
POTASSIUM BLD-SCNC: 4.4 MMOL/L (ref 3.4–5.3)
PROT SERPL-MCNC: 6.7 G/DL (ref 6.8–8.8)
RBC # BLD AUTO: 4.32 10E6/UL (ref 4.4–5.9)
RBC URINE: <1 /HPF
RSV RNA SPEC NAA+PROBE: NEGATIVE
SARS-COV-2 RNA RESP QL NAA+PROBE: NEGATIVE
SODIUM SERPL-SCNC: 138 MMOL/L (ref 133–144)
SP GR UR STRIP: 1.01 (ref 1–1.03)
UROBILINOGEN UR STRIP-MCNC: NORMAL MG/DL
WBC # BLD AUTO: 2.4 10E3/UL (ref 4–11)
WBC URINE: <1 /HPF

## 2022-10-23 PROCEDURE — 96361 HYDRATE IV INFUSION ADD-ON: CPT | Performed by: EMERGENCY MEDICINE

## 2022-10-23 PROCEDURE — 83690 ASSAY OF LIPASE: CPT | Performed by: EMERGENCY MEDICINE

## 2022-10-23 PROCEDURE — 250N000011 HC RX IP 250 OP 636: Performed by: EMERGENCY MEDICINE

## 2022-10-23 PROCEDURE — 80053 COMPREHEN METABOLIC PANEL: CPT | Performed by: EMERGENCY MEDICINE

## 2022-10-23 PROCEDURE — 87637 SARSCOV2&INF A&B&RSV AMP PRB: CPT | Performed by: EMERGENCY MEDICINE

## 2022-10-23 PROCEDURE — 85025 COMPLETE CBC W/AUTO DIFF WBC: CPT | Performed by: EMERGENCY MEDICINE

## 2022-10-23 PROCEDURE — 87493 C DIFF AMPLIFIED PROBE: CPT | Mod: XU | Performed by: EMERGENCY MEDICINE

## 2022-10-23 PROCEDURE — 36415 COLL VENOUS BLD VENIPUNCTURE: CPT | Performed by: EMERGENCY MEDICINE

## 2022-10-23 PROCEDURE — 86140 C-REACTIVE PROTEIN: CPT | Performed by: EMERGENCY MEDICINE

## 2022-10-23 PROCEDURE — 99285 EMERGENCY DEPT VISIT HI MDM: CPT | Mod: CS | Performed by: EMERGENCY MEDICINE

## 2022-10-23 PROCEDURE — G1010 CDSM STANSON: HCPCS

## 2022-10-23 PROCEDURE — 83605 ASSAY OF LACTIC ACID: CPT | Performed by: EMERGENCY MEDICINE

## 2022-10-23 PROCEDURE — 81001 URINALYSIS AUTO W/SCOPE: CPT | Performed by: EMERGENCY MEDICINE

## 2022-10-23 PROCEDURE — 96374 THER/PROPH/DIAG INJ IV PUSH: CPT | Mod: 59 | Performed by: EMERGENCY MEDICINE

## 2022-10-23 PROCEDURE — 82040 ASSAY OF SERUM ALBUMIN: CPT | Performed by: EMERGENCY MEDICINE

## 2022-10-23 PROCEDURE — 74177 CT ABD & PELVIS W/CONTRAST: CPT | Mod: MG

## 2022-10-23 PROCEDURE — 250N000009 HC RX 250: Performed by: EMERGENCY MEDICINE

## 2022-10-23 PROCEDURE — 258N000003 HC RX IP 258 OP 636: Performed by: EMERGENCY MEDICINE

## 2022-10-23 PROCEDURE — 99285 EMERGENCY DEPT VISIT HI MDM: CPT | Mod: CS,25 | Performed by: EMERGENCY MEDICINE

## 2022-10-23 PROCEDURE — 87506 IADNA-DNA/RNA PROBE TQ 6-11: CPT | Performed by: EMERGENCY MEDICINE

## 2022-10-23 PROCEDURE — C9803 HOPD COVID-19 SPEC COLLECT: HCPCS | Performed by: EMERGENCY MEDICINE

## 2022-10-23 RX ORDER — IOPAMIDOL 755 MG/ML
500 INJECTION, SOLUTION INTRAVASCULAR ONCE
Status: COMPLETED | OUTPATIENT
Start: 2022-10-23 | End: 2022-10-23

## 2022-10-23 RX ORDER — SODIUM CHLORIDE 9 MG/ML
INJECTION, SOLUTION INTRAVENOUS CONTINUOUS
Status: DISCONTINUED | OUTPATIENT
Start: 2022-10-23 | End: 2022-10-23 | Stop reason: HOSPADM

## 2022-10-23 RX ORDER — HYDROMORPHONE HYDROCHLORIDE 1 MG/ML
0.3 INJECTION, SOLUTION INTRAMUSCULAR; INTRAVENOUS; SUBCUTANEOUS
Status: DISCONTINUED | OUTPATIENT
Start: 2022-10-23 | End: 2022-10-23 | Stop reason: HOSPADM

## 2022-10-23 RX ORDER — OXYCODONE HYDROCHLORIDE 5 MG/1
5 TABLET ORAL EVERY 6 HOURS PRN
Qty: 18 TABLET | Refills: 0 | Status: SHIPPED | OUTPATIENT
Start: 2022-10-23 | End: 2023-02-08

## 2022-10-23 RX ADMIN — SODIUM CHLORIDE 1000 ML: 9 INJECTION, SOLUTION INTRAVENOUS at 15:58

## 2022-10-23 RX ADMIN — SODIUM CHLORIDE 60 ML: 9 INJECTION, SOLUTION INTRAVENOUS at 17:36

## 2022-10-23 RX ADMIN — IOPAMIDOL 90 ML: 755 INJECTION, SOLUTION INTRAVENOUS at 17:37

## 2022-10-23 RX ADMIN — HYDROMORPHONE HYDROCHLORIDE 0.3 MG: 1 INJECTION, SOLUTION INTRAMUSCULAR; INTRAVENOUS; SUBCUTANEOUS at 17:16

## 2022-10-23 ASSESSMENT — ACTIVITIES OF DAILY LIVING (ADL)
ADLS_ACUITY_SCORE: 35
ADLS_ACUITY_SCORE: 33
ADLS_ACUITY_SCORE: 35

## 2022-10-23 NOTE — ED TRIAGE NOTES
Patient had first round of chemo this past Tuesday, since Wednesday has had 4-6 episodes of diarrhea per day.      Triage Assessment     Row Name 10/23/22 1500       Triage Assessment (Adult)    Airway WDL WDL       Respiratory WDL    Respiratory WDL WDL       Skin Circulation/Temperature WDL    Skin Circulation/Temperature WDL WDL       Cardiac WDL    Cardiac WDL WDL

## 2022-10-23 NOTE — ED PROVIDER NOTES
History     Chief Complaint   Patient presents with     Diarrhea     HPI     Quan Murphy is a 72 year old male who is followed at [facility] for a recurrent SCC of the left tonsil.  He is currently receiving chemotherapy with plans for future immunotherapy, local surgery and radiation for metastatic rib lesion.  He is now presenting with diarrhea.  States he has had 5-6 loose stools per day.  Describes stools light brown.  Has had fever as high as 100.4 Fahrenheit.  Chills some sweats.  No vomiting.  Mild pain left lower quadrant.  Improved after release of loose stool.  Prior colonoscopy documents that he does have sigmoid and descending colon diverticulum.  He is not aware of any clinical history for having acute diverticulitis.    Allergies:  Allergies   Allergen Reactions     Animal Dander      Azithromycin Nausea and Vomiting     Dust Mites      Pollen Extract      Smoke.        Problem List:    Patient Active Problem List    Diagnosis Date Noted     Pancytopenia (H) 08/16/2022     Priority: Medium     Severe protein-calorie malnutrition (H) 08/16/2022     Priority: Medium     Chronic, continuous use of opioids 04/21/2022     Priority: Medium     Chronic kidney disease, stage 3 (H) 06/15/2021     Priority: Medium     Failure to thrive (0-17) 06/02/2021     Priority: Medium     Replacing diagnoses that were inactivated after the 10/1/2021 regulatory import.       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     Priority: Medium     Hypomagnesemia 04/13/2021     Priority: Medium     Hiatal hernia 02/17/2020     Priority: Medium     Renal hematoma 10/28/2017     Priority: Medium     Retroperitoneal hematoma 10/28/2017     Priority: Medium     Syncope 10/18/2017     Priority: Medium     S/P ORIF (open reduction internal fixation) fracture 08/03/2017     Priority: Medium     Closed Colles' fracture of right radius with routine healing, subsequent encounter 08/03/2017     Priority: Medium     Status  post insertion of drug-eluting stent into left anterior descending artery for coronary artery disease 01/05/2017     Priority: Medium     Chest pain 12/24/2016     Priority: Medium     Arthropathy 02/04/2014     Priority: Medium     Problem list name updated by automated process. Provider to review       Coronary atherosclerosis      Priority: Medium     Coronary artery disease  Problem list name updated by automated process. 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 Provider to review       Acute reaction to stress 01/12/2005     Priority: Medium     Problem list name updated by automated process. Provider to review       Chronic maxillary sinusitis 01/12/2005     Priority: Medium     Contracture of palmar fascia 01/12/2005     Priority: Medium     Benign neoplasm of skin of other and unspecified parts of face 01/12/2005     Priority: Medium     Malignant neoplasm of prostate (H) 11/26/2004     Priority: Medium        Past Medical History:    Past Medical History:   Diagnosis Date     Allergic rhinitis      Allergy, unspecified not elsewhere classified      Antiplatelet or antithrombotic long-term use      Anxiety      Arthritis      Chest pain      Chronic sinusitis      Coronary atherosclerosis of unspecified type of vessel, native or graft      Depressive disorder 1995     Gastroesophageal reflux disease 2020     Head injury 1954     Hiatal hernia 2015     History of  blood transfusion 12/15/2004     Hyperlipidemia      Hypertension      Inguinal hernia      Kidney problem 10/08/2017     Kidney stones      Malignant neoplasm of prostate (H)      Prostate cancer (H)      Syncope      Tonsil cancer (H)        Past Surgical History:    Past Surgical History:   Procedure Laterality Date     ARTHRODESIS FOOT  7/23/2013    Procedure: ARTHRODESIS FOOT;  Great Toe Arthrodesis Left Foot;  Surgeon: Ash Gonzalez DPM;  Location: [facility]     ARTHRODESIS FOOT  6/10/2014    Procedure: ARTHRODESIS FOOT;  Surgeon: Ash Gonzalez DPM;  Location: [facility] OR     BIOPSY  10/1/2004    dermatologist biopsies     COLONOSCOPY  10/7/2013    Procedure: COLONOSCOPY;  Colonoscopy;  Surgeon: Mike Fallon MD;  Location: [facility] GI     CYSTOSCOPY N/A 2/16/2022    Procedure: CYSTOSCOPY and bladder stone removal;  Surgeon: David Rogers MD;  Location: [facility] OR     ESOPHAGOSCOPY, GASTROSCOPY, DUODENOSCOPY (EGD), COMBINED N/A 2/12/2020    Procedure: ESOPHAGOGASTRODUODENOSCOPY (EGD);  Surgeon: Sam Escobar MD;  Location: [facility]     EXTRACORPOREAL SHOCK WAVE LITHOTRIPSY (ESWL) Bilateral 10/18/2017    Procedure: EXTRACORPOREAL SHOCK WAVE LITHOTRIPSY (ESWL);  BILATERAL EXTRACORPOREAL SHOCKWAVE LITHOTRIPSY ;  Surgeon: Meir Torres MD;  Location: [facility] OR     HC CORRECT BUNION,SIMPLE  08/11/2005    x3     HC REMV TOE BENIGN BONE LESN  08/11/2005     HEAD & NECK SURGERY  1954    From a fall     HERNIORRHAPHY INGUINAL  7/3/2013    Procedure: HERNIORRHAPHY INGUINAL;  Open Repair Inguinal hernia Right with mesh ;  Surgeon: Sam Escobar MD;  Location: [facility] OR     IR GASTROSTOMY TUBE PERCUTANEOUS PLCMNT  6/7/2021     MOHS MICROGRAPHIC PROCEDURE  08/23/11    ear and chin-[facility]     OPEN REDUCTION INTERNAL FIXATION WRIST Right 7/18/2017    Procedure: OPEN REDUCTION INTERNAL FIXATION WRIST;  Right distal radius open reduction and internal fixation;  Surgeon: Pedro Blanca DO;   Location: [facility]     RECONSTRUCT FOREFOOT WITH METATARSOPHALANGEAL (MTP) FUSION  6/10/2014    Procedure: RECONSTRUCT FOREFOOT WITH METATARSOPHALANGEAL (MTP) FUSION;  Surgeon: Ash Gonzalez DPM;  Location: [facility] OR     STENT, CORONARY, DEMI       SURGICAL HISTORY OF -   1999/1974    lt knee     SURGICAL HISTORY OF -   10/2004    lithotripsy     SURGICAL HISTORY OF -   11/05    angiogram with stent     VASCULAR SURGERY  11/17/2005    Puncture of iliac artery during and angiogram     [facility] LAPAROSCOPY, SURGICAL PROSTATECTOMY, RETROPUBIC RADICAL, W/NERVE SPARING  11/30/2004    With full bilateral pelvic lymphadenectomy.  [facility].     [facility] TOTAL KNEE ARTHROPLASTY  05/01/08    Left knee       Family History:    Family History   Problem Relation Age of Onset     Hypertension Father         Lived to age 87     Connective Tissue Disorder Mother         LUPUS     Heart Disease Mother         Valve replacement     Anxiety Disorder Mother         Lived to age 84     Dementia Mother         Nursing Home (lived to age 86)       Social History:  Marital Status:   [2]  Social History     Tobacco Use     Smoking status: Never     Smokeless tobacco: Never   Vaping Use     Vaping Use: Never used   Substance Use Topics     Alcohol use: Yes     Alcohol/week: 10.0 standard drinks     Types: 10 Cans of beer per week     Comment: 1 hard  liquid and 1 wine or beer per day     Drug use: No        Medications:    ALPRAZolam (XANAX) 0.5 MG tablet  buPROPion (WELLBUTRIN XL) 150 MG 24 hr tablet  cetirizine (ZYRTEC) 10 MG tablet  cevimeline (EVOXAC) 30 MG capsule  citalopram (CELEXA) 40 MG tablet  dronabinol (MARINOL) 2.5 MG capsule  HYDROcodone-acetaminophen (NORCO)  MG per tablet  levothyroxine (SYNTHROID/LEVOTHROID) 112 MCG tablet  levothyroxine (SYNTHROID/LEVOTHROID) 75 MCG tablet  metoprolol succinate ER (TOPROL-XL) 50 MG 24 hr tablet  nitroGLYcerin (NITROSTAT) 0.4 MG sublingual tablet  omeprazole (PRILOSEC) 40 MG   capsule  ondansetron (ZOFRAN ODT) 4 MG ODT tab  pilocarpine (SALAGEN) 5 MG tablet  rosuvastatin (CRESTOR) 40 MG tablet  zolpidem ER (AMBIEN CR) 12.5 MG CR tablet          Review of Systems   All other systems reviewed and are negative.      Physical Exam   BP: 122/83  Pulse: 69  Temp: 98.5  F (36.9  C)  Resp: 18  Weight: 89.7 kg (197 lb 11.2 oz)  SpO2: 98 %      Physical Exam  Vitals and nursing note reviewed.   Constitutional:       Appearance: He is ill-appearing.   HENT:      Nose: Nose normal. No congestion.      Mouth/Throat:      Mouth: Mucous membranes are dry.      Pharynx: No oropharyngeal exudate.   Eyes:      General: No scleral icterus.     Conjunctiva/sclera: Conjunctivae normal.      Pupils: Pupils are equal, round, and reactive to light.   Cardiovascular:      Rate and Rhythm: Normal rate and regular rhythm.      Pulses: Normal pulses.      Heart sounds: No murmur heard.  Pulmonary:      Effort: Pulmonary effort is normal. No respiratory distress.      Breath sounds: Normal breath sounds.   Abdominal:      General: Abdomen is flat. Bowel sounds are normal.      Tenderness: There is abdominal tenderness (Localized left lower quadrant.). There is no right CVA tenderness, left CVA tenderness, guarding or rebound.   Musculoskeletal:         General: Normal range of motion.      Cervical back: Normal range of motion.   Skin:     General: Skin is warm and dry.      Capillary Refill: Capillary refill takes less than 2 seconds.      Findings: No rash.   Neurological:      General: No focal deficit present.      Mental Status: He is alert and oriented to person, place, and time.   Psychiatric:         Mood and Affect: Mood normal.         Behavior: Behavior normal.         ED Course                 Procedures                  Results for orders placed or performed during the hospital encounter of 10/23/22 (from the past 24 hour(s))   Extra Tube ([place] Draw)    Narrative    The following orders were created  for panel order Extra Tube ([place] Draw).  Procedure                               Abnormality         Status                     ---------                               -----------         ------                     Extra Green Top (Lithium...[[id number]]                      Final result               Extra Purple Top Tube[[id number]]                            Final result               Extra Blood Bank Purple ...[[id number]]                                                 Extra Green Top (Lithium...[[id number]]                      Final result                 Please view results for these tests on the individual orders.   Extra Green Top (Lithium Heparin) Tube   Result Value Ref Range    Hold Specimen JIC    Extra Purple Top Tube   Result Value Ref Range    Hold Specimen JIC    Extra Green Top (Lithium Heparin) ON ICE   Result Value Ref Range    Hold Specimen JIC    CBC with platelets differential    Narrative    The following orders were created for panel order CBC with platelets differential.  Procedure                               Abnormality         Status                     ---------                               -----------         ------                     CBC with platelets and d...[[id number]]  Abnormal            Final result                 Please view results for these tests on the individual orders.   Comprehensive metabolic panel   Result Value Ref Range    Sodium 138 133 - 144 mmol/L    Potassium 4.4 3.4 - 5.3 mmol/L    Chloride 110 (H) 94 - 109 mmol/L    Carbon Dioxide (CO2) 26 20 - 32 mmol/L    Anion Gap 2 (L) 3 - 14 mmol/L    Urea Nitrogen 19 7 - 30 mg/dL    Creatinine 1.27 (H) 0.66 - 1.25 mg/dL    Calcium 8.9 8.5 - 10.1 mg/dL    Glucose 106 (H) 70 - 99 mg/dL    Alkaline Phosphatase 82 40 - 150 U/L    AST 41 0 - 45 U/L    ALT 42 0 - 70 U/L    Protein Total 6.7 (L) 6.8 - 8.8 g/dL    Albumin 3.0 (L) 3.4 - 5.0 g/dL    Bilirubin Total 0.5 0.2 - 1.3 mg/dL    GFR Estimate 60 (L) >60 mL/min/1.73m2    Lipase   Result Value Ref Range    Lipase 157 73 - 393 U/L   Lactic acid whole blood   Result Value Ref Range    Lactic Acid 1.0 0.7 - 2.0 mmol/L   CRP inflammation   Result Value Ref Range    CRP Inflammation 20.4 (H) 0.0 - 8.0 mg/L   CBC with platelets and differential   Result Value Ref Range    WBC Count 2.4 (L) 4.0 - 11.0 10e3/uL    RBC Count 4.32 (L) 4.40 - 5.90 10e6/uL    Hemoglobin 13.3 13.3 - 17.7 g/dL    Hematocrit 39.0 (L) 40.0 - 53.0 %    MCV 90 78 - 100 fL    MCH 30.8 26.5 - 33.0 pg    MCHC 34.1 31.5 - 36.5 g/dL    RDW 13.7 10.0 - 15.0 %    Platelet Count 121 (L) 150 - 450 10e3/uL    % Neutrophils 77 %    % Lymphocytes 12 %    % Monocytes 5 %    % Eosinophils 5 %    % Basophils 0 %    % Immature Granulocytes 1 %    NRBCs per 100 WBC 0 <1 /100    Absolute Neutrophils 1.8 1.6 - 8.3 10e3/uL    Absolute Lymphocytes 0.3 (L) 0.8 - 5.3 10e3/uL    Absolute Monocytes 0.1 0.0 - 1.3 10e3/uL    Absolute Eosinophils 0.1 0.0 - 0.7 10e3/uL    Absolute Basophils 0.0 0.0 - 0.2 10e3/uL    Absolute Immature Granulocytes 0.0 <=0.4 10e3/uL    Absolute NRBCs 0.0 10e3/uL   Extra Tube    Narrative    The following orders were created for panel order Extra Tube.  Procedure                               Abnormality         Status                     ---------                               -----------         ------                     Extra Blue Top Tube[[id number]]                                                           Please view results for these tests on the individual orders.   UA with Microscopic reflex to Culture    Specimen: Urine, NOS   Result Value Ref Range    Color Urine Yellow Colorless, Straw, Light Yellow, Yellow    Appearance Urine Clear Clear    Glucose Urine Negative Negative mg/dL    Bilirubin Urine Negative Negative    Ketones Urine Negative Negative mg/dL    Specific Gravity Urine 1.014 1.003 - 1.035    Blood Urine Negative Negative    pH Urine 5.0 5.0 - 7.0    Protein Albumin Urine Negative Negative  mg/dL    Urobilinogen Urine Normal Normal, 2.0 mg/dL    Nitrite Urine Negative Negative    Leukocyte Esterase Urine Negative Negative    Mucus Urine Present (A) None Seen /LPF    RBC Urine <1 <=2 /HPF    WBC Urine <1 <=5 /HPF    Hyaline Casts Urine 1 <=2 /LPF    Narrative    Urine Culture not indicated   Symptomatic; Unknown Influenza A/B & SARS-CoV2 (COVID-19) Virus PCR Multiplex Nose    Specimen: Nose; Swab   Result Value Ref Range    Influenza A PCR Negative Negative    Influenza B PCR Negative Negative    RSV PCR Negative Negative    SARS CoV2 PCR Negative Negative    Narrative    Testing was performed using the Xpert Xpress CoV2/Flu/RSV Assay on the [employer] GeneXpert Instrument. This test should be ordered for the detection of SARS-CoV-2 and influenza viruses in individuals who meet clinical and/or epidemiological criteria. Test performance is unknown in asymptomatic patients. This test is for in vitro diagnostic use under the FDA EUA for laboratories certified under CLIA to perform high or moderate complexity testing. This test has not been FDA cleared or approved. A negative result does not rule out the presence of PCR inhibitors in the specimen or target RNA in concentration below the limit of detection for the assay. If only one viral target is positive but coinfection with multiple targets is suspected, the sample should be re-tested with another FDA cleared, approved, or authorized test, if coinfection would change clinical management. This test was validated by the [facility]. These laboratories are certified under the Clinical Laboratory Improvement Amendments of 1988 (CLIA-88) as qualified to perform high complexity laboratory testing.   CT Abdomen Pelvis w Contrast    Narrative    EXAM: CT ABDOMEN PELVIS W CONTRAST  LOCATION: [facility]  DATE/TIME: 10/23/2022 5:49 PM    INDICATION: Diarrhea, left lower quadrant abdominal pain, history for  diverticulosis. History of tonsillar squamous cell carcinoma.  COMPARISON: PET/CT 8/30/2022. Abdominal CT 8/5/2019  TECHNIQUE: CT scan of the abdomen and pelvis was performed following injection of IV contrast. Multiplanar reformats were obtained. Dose reduction techniques were used.  CONTRAST: ISOVUE 370, 90 mL    FINDINGS:   LOWER CHEST: Normal.    HEPATOBILIARY: Small gallstones unchanged. Liver unremarkable.    PANCREAS: Normal.    SPLEEN: Normal.    ADRENAL GLANDS: Normal.    KIDNEYS/BLADDER: A few tiny nonobstructing stones seen within each kidney. No hydronephrosis.  Previously identified bladder stone no longer present.    BOWEL: Diverticulosis of sigmoid colon minimally changed with mild bowel wall thickening over a long segment most consistent with chronic diverticular disease. No definite acute diverticulitis evident. Bowel otherwise unremarkable.    LYMPH NODES: Normal.    VASCULATURE: Moderate atherosclerotic plaque. Right common iliac artery stent unchanged.    PELVIC ORGANS: Prostatectomy. No ascites.    MUSCULOSKELETAL: Degenerative changes in scoliosis. No suspicious bony lesion.      Impression    IMPRESSION:   1.  Chronic sigmoid diverticulosis. Similar to previous exam, no definite superimposed acute inflammation.  2.  Tiny nonobstructing kidney stones, no hydronephrosis.  3.  Mild cholelithiasis unchanged.     *Note: Due to a large number of results and/or encounters for the requested time period, some results have not been displayed. A complete set of results can be found in Results Review.       Medications   0.9% sodium chloride BOLUS (0 mLs Intravenous Stopped 10/23/22 8675)     Followed by   sodium chloride 0.9% infusion (has no administration in time range)   0.9% sodium chloride BOLUS (has no administration in time range)   iopamidol (ISOVUE-370) solution 500 mL (has no administration in time range)   sodium chloride 0.9 % bag 100mL for CT scan flush use (has no administration in time  range)       Assessments & Plan (with Medical Decision Making)  New is 72 years of age.  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  He also was concerned for a potential liver nodule and a rib involvement.  He has switched care from the [facility] to the [facility].  They are very pleased with the care at the [facility].  He is currently on chemotherapy.  They are hoping that he will have a positive response allowing for local resection of the tonsil tumor mass and then proceed with radiation therapy to the rib.  He has been having increased pain and has an appointment for a palliative care at the [facility].  Current concerns has been having diarrhea.  His wife a retired RN has been monitoring his fever with T-max around 100 Fahrenheit.  She is concerned that some of the Tylenol and his Norco might be masking even a higher fever.  He arrived this evening with a temp of 98.5.  Clinically he did not appear septic.  Fortunately his lactate was normal.  He did look fluid volume down.  He was rehydrated with 2 L of normal saline.  We saw no electrolyte disturbance.  He did have a decline in his GFR which will need to be monitored carefully while he is on chemotherapy.  I suspect that this is prerenal and should rebound with his hydration.  He is having frequent loose brown stools.  Please send off stool for viral panel, enteric pathogen panel and C. difficile toxin B.  His total white blood cell count and more specifically his absolute neutrophil count = 1.8 was encouraging but at this time did not feel that he needed hospitalization or full septic work-up broad-spectrum antibiotics for neutropenic fever.  His wife will continue to monitor his status and if symptoms intensify she knows to bring him back.  I asked that they contact primary care provider in the clinic for review of stool test results which should be available in the next 1 to 3 days.  We completed a CT  scan after I determined that he had a colonoscopy in 2013 that showed sigmoid and descending colon diverticulum.  His abdomen on examination was tender over that area but had a nonsurgical abdomen.  Fortunately his CT did not show any active colitis or diverticulitis.  A prescription for oxycodone 5 mg strength tablets was prescribed for comfort.  He has tolerated this in the past.     I have reviewed the nursing notes.    I have reviewed the findings, diagnosis, plan and need for follow up with the patient.      New Prescriptions    No medications on file       Final diagnoses:   Diarrhea of presumed infectious origin   Dehydration   Squamous cell carcinoma of tonsil (H) - Currently receiving chemotherapy at [facility]       10/23/2022   [facility] EMERGENCY DEPT     Dhiraj Hernandez,   10/23/22 1932

## 2022-10-24 NOTE — TELEPHONE ENCOUNTER
Patient requesting to have Norco sig read: Take 1 tablet by mouth 6 x daily prn for severe pain instead of 5 x daily.  Ok for this?    If appropriate, prescription for 6 x daily or every 4 hours prn has been pended.

## 2022-10-24 NOTE — TELEPHONE ENCOUNTER
Patient calling from [facility] checking for an update on rx. Patient has been receiving his chemo infusions and has had a lot of pain issues lately, recently was seen in ED. Patient just wanted to make sure provider is receiving his message. Informed patient that message was received and sent to the provider.

## 2022-10-24 NOTE — DISCHARGE INSTRUCTIONS
-Oxycodone 5 mg tablet every 6-8 hours as needed for severe pain    -Stool results should be available in the next 1 to 3 days.  Results will be available in [facility].  Please contact Dr. Rm Hernandez's clinic to discuss treatment if any test results are positive.    -If you start having high fever, blood or mucus in stool or symptoms of diarrhea and abdominal cramping or not improving over next 72 hours I would recommend recheck in the clinic or the emergency department.  At this point in time low-grade fever with your current white count is not a red flag but if symptoms progress and your white count drops that would be of concern. (Neutropenic fever).     It is important to maintain good hydration through oral intake.  If you are having excessive stool output that is contributing to the inability to maintain oral hydration you can use I Imodium purchased over-the-counter with a goal of reducing stool output by no more than 50%.  Typically patients can maintain oral hydration if we keep his stool count down around 5 or 6 loose stools per day.   none

## 2022-10-25 RX ORDER — ALPRAZOLAM 0.5 MG
0.5 TABLET ORAL 4 TIMES DAILY PRN
Qty: 120 TABLET | Refills: 0 | Status: SHIPPED | OUTPATIENT
Start: 2022-10-25 | End: 2022-11-22

## 2022-10-25 RX ORDER — HYDROCODONE BITARTRATE AND ACETAMINOPHEN 10; 325 MG/1; MG/1
1 TABLET ORAL EVERY 4 HOURS PRN
Qty: 180 TABLET | Refills: 0 | Status: SHIPPED | OUTPATIENT
Start: 2022-10-25 | End: 2023-02-08

## 2022-10-25 NOTE — TELEPHONE ENCOUNTER
Requested Prescriptions   Pending Prescriptions Disp Refills     ALPRAZolam (XANAX) 0.5 MG tablet 120 tablet 0     Sig: Take 1 tablet (0.5 mg) by mouth 4 times daily as needed for anxiety       Next 5 appointments (look out 90 days)    Oct 27, 2022 10:00 AM  Nurse Visit with [facility] RN NURSE  [facility] ([facility] ) [address] [place] [id number]  [phone]   Nov 01, 2022  2:30 PM  Return Visit with Aramis Ch MD  [facility] ([facility] ) [address] [place] [id number]  [phone]           Routing refill request to provider for review/approval because:  Drug not on the [facility], [facility] or [facility] refill protocol or controlled substance

## 2022-10-25 NOTE — RESULT ENCOUNTER NOTE
Final Enteric Bacteria and Virus Panel by NARAYAN Stool is NEGATIVE for all tested organisms (bacteria/virus).  No treatment or change in treatment per [facility] Lab Result Enteric Bacteria and Virus Panel protocol.

## 2022-10-27 ENCOUNTER — HOSPITAL ENCOUNTER (OUTPATIENT)
Dept: CARDIOLOGY | Facility: CLINIC | Age: 72
Discharge: HOME | End: 2022-10-27
Attending: INTERNAL MEDICINE | Admitting: INTERNAL MEDICINE
Payer: MEDICARE

## 2022-10-27 ENCOUNTER — LAB (OUTPATIENT)
Dept: LAB | Facility: CLINIC | Age: 72
End: 2022-10-27
Payer: MEDICARE

## 2022-10-27 ENCOUNTER — IMMUNIZATION (OUTPATIENT)
Dept: FAMILY MEDICINE | Facility: CLINIC | Age: 72
End: 2022-10-27
Payer: MEDICARE

## 2022-10-27 DIAGNOSIS — I25.750 CORONARY ARTERY DISEASE INVOLVING NATIVE ARTERY OF TRANSPLANTED HEART WITH UNSTABLE ANGINA PECTORIS (H): ICD-10-CM

## 2022-10-27 DIAGNOSIS — I10 HYPERTENSION GOAL BP (BLOOD PRESSURE) < 140/90: ICD-10-CM

## 2022-10-27 DIAGNOSIS — Z13.220 SCREENING FOR HYPERLIPIDEMIA: ICD-10-CM

## 2022-10-27 DIAGNOSIS — I25.110 ATHEROSCLEROSIS OF NATIVE CORONARY ARTERY OF NATIVE HEART WITH UNSTABLE ANGINA PECTORIS (H): ICD-10-CM

## 2022-10-27 DIAGNOSIS — I25.10 CORONARY ATHEROSCLEROSIS: ICD-10-CM

## 2022-10-27 DIAGNOSIS — E78.5 HYPERLIPIDEMIA LDL GOAL <130: ICD-10-CM

## 2022-10-27 LAB
ALT SERPL W P-5'-P-CCNC: 60 U/L (ref 0–70)
ANION GAP SERPL CALCULATED.3IONS-SCNC: 4 MMOL/L (ref 3–14)
BUN SERPL-MCNC: 16 MG/DL (ref 7–30)
CALCIUM SERPL-MCNC: 8.8 MG/DL (ref 8.5–10.1)
CHLORIDE BLD-SCNC: 107 MMOL/L (ref 94–109)
CHOLEST SERPL-MCNC: 124 MG/DL
CO2 SERPL-SCNC: 27 MMOL/L (ref 20–32)
CREAT SERPL-MCNC: 1.13 MG/DL (ref 0.66–1.25)
FASTING STATUS PATIENT QL REPORTED: YES
GFR SERPL CREATININE-BSD FRML MDRD: 69 ML/MIN/1.73M2
GLUCOSE BLD-MCNC: 90 MG/DL (ref 70–99)
HDLC SERPL-MCNC: 47 MG/DL
LDLC SERPL CALC-MCNC: 48 MG/DL
LVEF ECHO: NORMAL
NONHDLC SERPL-MCNC: 77 MG/DL
POTASSIUM BLD-SCNC: 4 MMOL/L (ref 3.4–5.3)
SODIUM SERPL-SCNC: 138 MMOL/L (ref 133–144)
TRIGL SERPL-MCNC: 147 MG/DL

## 2022-10-27 PROCEDURE — 80061 LIPID PANEL: CPT

## 2022-10-27 PROCEDURE — 36415 COLL VENOUS BLD VENIPUNCTURE: CPT

## 2022-10-27 PROCEDURE — 84460 ALANINE AMINO (ALT) (SGPT): CPT | Performed by: INTERNAL MEDICINE

## 2022-10-27 PROCEDURE — 93306 TTE W/DOPPLER COMPLETE: CPT | Mod: 26 | Performed by: INTERNAL MEDICINE

## 2022-10-27 PROCEDURE — G0008 ADMIN INFLUENZA VIRUS VAC: HCPCS

## 2022-10-27 PROCEDURE — 90662 IIV NO PRSV INCREASED AG IM: CPT

## 2022-10-27 PROCEDURE — 80048 BASIC METABOLIC PNL TOTAL CA: CPT | Performed by: INTERNAL MEDICINE

## 2022-10-27 PROCEDURE — 93306 TTE W/DOPPLER COMPLETE: CPT

## 2022-11-01 ENCOUNTER — OFFICE VISIT (OUTPATIENT)
Dept: CARDIOLOGY | Facility: CLINIC | Age: 72
End: 2022-11-01
Payer: MEDICARE

## 2022-11-01 VITALS
SYSTOLIC BLOOD PRESSURE: 104 MMHG | BODY MASS INDEX: 30.83 KG/M2 | HEART RATE: 68 BPM | OXYGEN SATURATION: 96 % | HEIGHT: 68 IN | DIASTOLIC BLOOD PRESSURE: 68 MMHG | WEIGHT: 203.4 LBS

## 2022-11-01 DIAGNOSIS — I10 BENIGN ESSENTIAL HYPERTENSION: ICD-10-CM

## 2022-11-01 DIAGNOSIS — I25.10 CORONARY ARTERY DISEASE INVOLVING NATIVE CORONARY ARTERY OF NATIVE HEART WITHOUT ANGINA PECTORIS: Primary | ICD-10-CM

## 2022-11-01 DIAGNOSIS — E78.5 HYPERLIPIDEMIA LDL GOAL <130: ICD-10-CM

## 2022-11-01 PROCEDURE — 99214 OFFICE O/P EST MOD 30 MIN: CPT | Performed by: INTERNAL MEDICINE

## 2022-11-01 RX ORDER — METOPROLOL SUCCINATE 50 MG/1
50 TABLET, EXTENDED RELEASE ORAL DAILY
Qty: 90 TABLET | Refills: 3 | Status: SHIPPED | OUTPATIENT
Start: 2022-11-01 | End: 2023-01-01

## 2022-11-01 NOTE — PROGRESS NOTES
HPI and Plan:   See dictation    Today's clinic visit entailed:  Review of the result(s) of each unique test - echo, bmp, flp, alt, nuclear stress test  The following tests were independently interpreted by me as noted in my documentation: nuclear images  Ordering of each unique test  Prescription drug management  30 minutes spent on the date of the encounter doing chart review, review of test results, interpretation of tests, patient visit and documentation   Provider  Link to MDM Help Grid     The level of medical decision making during this visit was of moderate complexity.      Orders Placed This Encounter   Procedures     Basic metabolic panel     Lipid Profile     ALT     Follow-Up with Cardiology       No orders of the defined types were placed in this encounter.      There are no discontinued medications.      Encounter Diagnoses   Name Primary?     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) Yes     Hyperlipidemia LDL goal <130        CURRENT MEDICATIONS:  Current Outpatient Medications   Medication Sig Dispense Refill     ALPRAZolam (XANAX) 0.5 MG tablet Take 1 tablet (0.5 mg) by mouth 4 times daily as needed for anxiety 120 tablet 0     buPROPion (WELLBUTRIN XL) 150 MG 24 hr tablet TAKE ONE TABLET BY MOUTH EVERY MORNING 30 tablet 1     cetirizine (ZYRTEC) 10 MG tablet Take 10 mg by mouth daily       cevimeline (EVOXAC) 30 MG capsule Take 1 capsule (30 mg) by mouth 3 times daily 120 capsule 11     citalopram (CELEXA) 40 MG tablet Take 1 tablet (40 mg) by mouth daily (Patient taking differently: Take 40 mg by mouth At Bedtime) 90 tablet 1     dronabinol (MARINOL) 2.5 MG capsule Take 1 capsule (2.5 mg) by mouth 2 times daily (before meals) 28 capsule 3     HYDROcodone-acetaminophen (NORCO)  MG per tablet Take 1 tablet by mouth every 4 hours as needed for severe pain 180 tablet 0     levothyroxine  (SYNTHROID/LEVOTHROID) 112 MCG tablet Take 1 tablet (112 mcg) by mouth daily 30 tablet 11     levothyroxine (SYNTHROID/LEVOTHROID) 75 MCG tablet Take 1 tablet (75 mcg) by mouth daily 30 tablet 11     metoprolol succinate ER (TOPROL-XL) 50 MG 24 hr tablet Take 1 tablet (50 mg) by mouth daily 90 tablet 2     omeprazole (PRILOSEC) 40 MG DR capsule Take 1 capsule (40 mg) by mouth daily 90 capsule 3     ondansetron (ZOFRAN ODT) 4 MG ODT tab Take 1 tablet (4 mg) by mouth every 8 hours as needed for nausea 30 tablet 1     oxyCODONE (ROXICODONE) 5 MG tablet Take 1 tablet (5 mg) by mouth every 6 hours as needed for severe pain 18 tablet 0     pilocarpine (SALAGEN) 5 MG tablet Take 1 tablet (5 mg) by mouth 3 times daily 90 tablet 3     rosuvastatin (CRESTOR) 40 MG tablet Take 1 tablet (40 mg) by mouth daily 90 tablet 3     zolpidem ER (AMBIEN CR) 12.5 MG CR tablet TAKE ONE TABLET BY MOUTH NIGHTLY AS NEEDED FOR SLEEP. MUST LAST 30 DAYS 30 tablet 3     nitroGLYcerin (NITROSTAT) 0.4 MG sublingual tablet For chest pain place 1 tablet under the tongue every 5 minutes for 3 doses. If symptoms persist 5 minutes after 1st dose call 911. (Patient not taking: Reported on 8/31/2022) 25 tablet 0       ALLERGIES     Allergies   Allergen Reactions     Animal Dander      Azithromycin Nausea and Vomiting     Dust Mites      Pollen Extract      Smoke.        PAST MEDICAL HISTORY:  Past Medical History:   Diagnosis Date     Allergic rhinitis      Allergy, unspecified not elsewhere classified     Seasonal allergies, pollen, dust, smoke and animals     Antiplatelet or antithrombotic long-term use      Anxiety      Arthritis      Chest pain      Chronic sinusitis      Coronary atherosclerosis of unspecified type of vessel, native or graft     Coronary artery disease     Depressive disorder 1995     Gastroesophageal reflux disease 2020    Cleared with medication     Head injury 1954     Hiatal hernia 2015    Right Side     History of blood  transfusion 12/15/2004    Prostate Surgery - My own blood     Hyperlipidemia      Hypertension      Inguinal hernia      Kidney problem 10/08/2017    Lithotripsy     Kidney stones      Malignant neoplasm of prostate (H)     Prostate cancer     Prostate cancer (H)      Syncope      Tonsil cancer (H)        PAST SURGICAL HISTORY:  Past Surgical History:   Procedure Laterality Date     ARTHRODESIS FOOT  7/23/2013    Procedure: ARTHRODESIS FOOT;  Great Toe Arthrodesis Left Foot;  Surgeon: Ash Gonzalez DPM;  [facility]     ARTHRODESIS FOOT  6/10/2014    Procedure: ARTHRODESIS FOOT;  Surgeon: Ash Gonzalez DPM;  [facility]     BIOPSY  10/1/2004    dermatologist biopsies     COLONOSCOPY  10/7/2013    Procedure: COLONOSCOPY;  Colonoscopy;  Surgeon: Mike Fallon MD;  [facility]     CYSTOSCOPY N/A 2/16/2022    Procedure: CYSTOSCOPY and bladder stone removal;  Surgeon: David Rogers MD;  [facility]     ESOPHAGOSCOPY, GASTROSCOPY, DUODENOSCOPY (EGD), COMBINED N/A 2/12/2020    Procedure: ESOPHAGOGASTRODUODENOSCOPY (EGD);  Surgeon: Sam Escobar MD;  [facility]     EXTRACORPOREAL SHOCK WAVE LITHOTRIPSY (ESWL) Bilateral 10/18/2017    Procedure: EXTRACORPOREAL SHOCK WAVE LITHOTRIPSY (ESWL);  BILATERAL EXTRACORPOREAL SHOCKWAVE LITHOTRIPSY ;  Surgeon: Meir Torres MD;  [facility]     HC CORRECT BUNION,SIMPLE  08/11/2005    x3     HC REMV TOE BENIGN BONE LESN  08/11/2005     HEAD & NECK SURGERY  1954    From a fall     HERNIORRHAPHY INGUINAL  7/3/2013    Procedure: HERNIORRHAPHY INGUINAL;  Open Repair Inguinal hernia Right with mesh ;  Surgeon: Sam Escobar MD;  [facility]     IR GASTROSTOMY TUBE PERCUTANEOUS PLCMNT  6/7/2021     MOHS MICROGRAPHIC PROCEDURE  08/23/11    ear and chin-[facility]     OPEN REDUCTION INTERNAL FIXATION WRIST Right 7/18/2017    Procedure: OPEN REDUCTION INTERNAL FIXATION WRIST;  Right distal radius open reduction and  internal fixation;  Surgeon: Pedro Blanca DO;  [facility]     RECONSTRUCT FOREFOOT WITH METATARSOPHALANGEAL (MTP) FUSION  6/10/2014    Procedure: RECONSTRUCT FOREFOOT WITH METATARSOPHALANGEAL (MTP) FUSION;  Surgeon: Ash Gonzalez DPM;  [facility]     STENT, CORONARY, DEMI       SURGICAL HISTORY OF -   1999/1974    lt knee     SURGICAL HISTORY OF -   10/2004    lithotripsy     SURGICAL HISTORY OF -   11/05    angiogram with stent     VASCULAR SURGERY  11/17/2005    Puncture of iliac artery during and angiogram     [facility] LAPAROSCOPY, SURGICAL PROSTATECTOMY, RETROPUBIC RADICAL, W/NERVE SPARING  11/30/2004    With full bilateral pelvic lymphadenectomy.  [facility].     [facility] TOTAL KNEE ARTHROPLASTY  05/01/08    Left knee       FAMILY HISTORY:  Family History   Problem Relation Age of Onset     Hypertension Father         Lived to age 87     Connective Tissue Disorder Mother         LUPUS     Heart Disease Mother         Valve replacement     Anxiety Disorder Mother         Lived to age 84     Dementia Mother         Nursing Home (lived to age 86)       SOCIAL HISTORY:  Social History     Socioeconomic History     Marital status:      Spouse name: Alessandra     Number of children: 2     Years of education: None     Highest education level: None   Occupational History     Occupation: Retired   Tobacco Use     Smoking status: Never     Smokeless tobacco: Never   Vaping Use     Vaping Use: Never used   Substance and Sexual Activity     Alcohol use: Yes     Alcohol/week: 10.0 standard drinks     Types: 10 Cans of beer per week     Comment: 1 hard  liquid and 1 wine or beer per day     Drug use: No     Sexual activity: Yes     Partners: Female     Birth control/protection: Female Surgical   Other Topics Concern     Parent/sibling w/ CABG, MI or angioplasty before 65F 55M? No       Review of Systems:  Skin:          Eyes:         ENT:         Respiratory:  Negative shortness of breath;cough;wheezing    "  Cardiovascular:  Negative;palpitations;chest pain;edema;lightheadedness;dizziness;syncope or near-syncope      Gastroenterology: Positive for diarrhea    Genitourinary:         Musculoskeletal:         Neurologic:  Negative numbness or tingling of hands;numbness or tingling of feet    Psychiatric:         Heme/Lymph/Imm:  Positive for allergies    Endocrine:           Physical Exam:  Vitals: /68 (BP Location: Right arm, Patient Position: Sitting, Cuff Size: Adult Large)   Pulse 68   Ht 1.727 m (5' 8\")   Wt 92.3 kg (203 lb 6.4 oz)   SpO2 96%   BMI 30.93 kg/m      Constitutional:           Skin:             Head:           Eyes:           Lymph:      ENT:           Neck:           Respiratory:            Cardiac:                                                           GI:           Extremities and Muscular Skeletal:                 Neurological:           Psych:           CC  Aramis Ch MD  [address]  [place] [id number]              "

## 2022-11-01 NOTE — PROGRESS NOTES
Service Date: 11/01/2022    HISTORY OF PRESENT ILLNESS:  I had the opportunity to see Mr. Quan Murphy in Cardiology Clinic today at [facility] in [place] for reevaluation of coronary artery disease and cardiac risk factors including hypertension and dyslipidemia.      He is a 72-year-old gentleman who had left shoulder and left arm discomfort in 2016 and found to have severe proximal LAD disease, treated with angioplasty and stenting.  Since then, he has done very well from a cardiac standpoint.      He did have some chest discomfort last spring and underwent stress testing with nuclear imaging, which looked normal.  On 10/27/2022, he also underwent an echocardiogram, which looked normal as well.  His left ventricular size and function was normal with an ejection fraction of 55%-60% with no significant valvular disease.  Mild aortic regurgitation and mild mitral regurgitation were noted.    His basic metabolic panel and cholesterol numbers are normal with an LDL of 48, HDL 47 and ALT was normal at 60.    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  He is hoping for some new medication that might help improve his success rate.    PHYSICAL EXAMINATION:  On examination here today, his blood pressure was 104/68, heart rate 68 and weight 203 pounds.  His lungs are clear.  Heart rhythm is regular.  There are no cardiac murmurs or carotid bruits.    IMPRESSIONS:  Mr. Quan Antonio is a 72-year-old gentleman with hypertension, dyslipidemia and coronary artery disease with a history of stenting of his LAD back in 2016.  He has normal left ventricular size and function and a normal stress test as recently as spring of 2022.  He has no concerning cardiac symptoms recently.    I am hopeful that he can get some effective treatment for his throat cancer.  From a cardiac standpoint, he will be at low risk to undergo any kind of surgical procedure that might be  necessary without any additional cardiac testing or treatment at this time.  I would recommend that he continue his current dose of metoprolol succinate throughout the perioperative period in order to keep his risk low.    We will continue to follow up with him in Cardiology Clinic in 6 months for reevaluation.    cc:   [place] [address]     Aramis Ch MD, [facility]        D: 2022   T: 2022   MT: JOVAN    Name:     CHUCHO MONTOYAFortino  MRN:      [id number]        Account:      [id number]   :      1950           Service Date: 2022       Document: [id number]

## 2022-11-01 NOTE — LETTER
11/1/2022    Jose Hernandez MD  [address] [place] [id number]    RE: Quan Murphy       Dear Colleague,     I had the pleasure of seeing Quan [facility]uphy in the [facility].  HPI and Plan:   See dictation    Today's clinic visit entailed:  Review of the result(s) of each unique test - echo, bmp, flp, alt, nuclear stress test  The following tests were independently interpreted by me as noted in my documentation: nuclear images  Ordering of each unique test  Prescription drug management  30 minutes spent on the date of the encounter doing chart review, review of test results, interpretation of tests, patient visit and documentation   Provider  Link to MDM Help Grid     The level of medical decision making during this visit was of moderate complexity.      Orders Placed This Encounter   Procedures     Basic metabolic panel     Lipid Profile     ALT     Follow-Up with Cardiology       No orders of the defined types were placed in this encounter.      There are no discontinued medications.      Encounter Diagnoses   Name Primary?     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) Yes     Hyperlipidemia LDL goal <130        CURRENT MEDICATIONS:  Current Outpatient Medications   Medication Sig Dispense Refill     ALPRAZolam (XANAX) 0.5 MG tablet Take 1 tablet (0.5 mg) by mouth 4 times daily as needed for anxiety 120 tablet 0     buPROPion (WELLBUTRIN XL) 150 MG 24 hr tablet TAKE ONE TABLET BY MOUTH EVERY MORNING 30 tablet 1     cetirizine (ZYRTEC) 10 MG tablet Take 10 mg by mouth daily       cevimeline (EVOXAC) 30 MG capsule Take 1 capsule (30 mg) by mouth 3 times daily 120 capsule 11     citalopram (CELEXA) 40 MG tablet Take 1 tablet (40 mg) by mouth daily (Patient taking differently: Take 40 mg by mouth At Bedtime) 90 tablet 1     dronabinol (MARINOL) 2.5 MG capsule Take 1 capsule (2.5 mg)  by mouth 2 times daily (before meals) 28 capsule 3     HYDROcodone-acetaminophen (NORCO)  MG per tablet Take 1 tablet by mouth every 4 hours as needed for severe pain 180 tablet 0     levothyroxine (SYNTHROID/LEVOTHROID) 112 MCG tablet Take 1 tablet (112 mcg) by mouth daily 30 tablet 11     levothyroxine (SYNTHROID/LEVOTHROID) 75 MCG tablet Take 1 tablet (75 mcg) by mouth daily 30 tablet 11     metoprolol succinate ER (TOPROL-XL) 50 MG 24 hr tablet Take 1 tablet (50 mg) by mouth daily 90 tablet 2     omeprazole (PRILOSEC) 40 MG DR capsule Take 1 capsule (40 mg) by mouth daily 90 capsule 3     ondansetron (ZOFRAN ODT) 4 MG ODT tab Take 1 tablet (4 mg) by mouth every 8 hours as needed for nausea 30 tablet 1     oxyCODONE (ROXICODONE) 5 MG tablet Take 1 tablet (5 mg) by mouth every 6 hours as needed for severe pain 18 tablet 0     pilocarpine (SALAGEN) 5 MG tablet Take 1 tablet (5 mg) by mouth 3 times daily 90 tablet 3     rosuvastatin (CRESTOR) 40 MG tablet Take 1 tablet (40 mg) by mouth daily 90 tablet 3     zolpidem ER (AMBIEN CR) 12.5 MG CR tablet TAKE ONE TABLET BY MOUTH NIGHTLY AS NEEDED FOR SLEEP. MUST LAST 30 DAYS 30 tablet 3     nitroGLYcerin (NITROSTAT) 0.4 MG sublingual tablet For chest pain place 1 tablet under the tongue every 5 minutes for 3 doses. If symptoms persist 5 minutes after 1st dose call 911. (Patient not taking: Reported on 8/31/2022) 25 tablet 0       ALLERGIES     Allergies   Allergen Reactions     Animal Dander      Azithromycin Nausea and Vomiting     Dust Mites      Pollen Extract      Smoke.        PAST MEDICAL HISTORY:  Past Medical History:   Diagnosis Date     Allergic rhinitis      Allergy, unspecified not elsewhere classified     Seasonal allergies, pollen, dust, smoke and animals     Antiplatelet or antithrombotic long-term use      Anxiety      Arthritis      Chest pain      Chronic sinusitis      Coronary atherosclerosis of unspecified type of vessel, native or graft      Coronary artery disease     Depressive disorder 1995     Gastroesophageal reflux disease 2020    Cleared with medication     Head injury 1954     Hiatal hernia 2015    Right Side     History of blood transfusion 12/15/2004    Prostate Surgery - My own blood     Hyperlipidemia      Hypertension      Inguinal hernia      Kidney problem 10/08/2017    Lithotripsy     Kidney stones      Malignant neoplasm of prostate (H)     Prostate cancer     Prostate cancer (H)      Syncope      Tonsil cancer (H)        PAST SURGICAL HISTORY:  Past Surgical History:   Procedure Laterality Date     ARTHRODESIS FOOT  7/23/2013    Procedure: ARTHRODESIS FOOT;  Great Toe Arthrodesis Left Foot;  Surgeon: Ash Gonzalez DPM;  [facility]     ARTHRODESIS FOOT  6/10/2014    Procedure: ARTHRODESIS FOOT;  Surgeon: Ash Gonzalez DPM;  [facility]     BIOPSY  10/1/2004    dermatologist biopsies     COLONOSCOPY  10/7/2013    Procedure: COLONOSCOPY;  Colonoscopy;  Surgeon: Mike Fallon MD;  [facility]     CYSTOSCOPY N/A 2/16/2022    Procedure: CYSTOSCOPY and bladder stone removal;  Surgeon: David Rogers MD;  [facility]     ESOPHAGOSCOPY, GASTROSCOPY, DUODENOSCOPY (EGD), COMBINED N/A 2/12/2020    Procedure: ESOPHAGOGASTRODUODENOSCOPY (EGD);  Surgeon: Sam Escobar MD;  [facility]     EXTRACORPOREAL SHOCK WAVE LITHOTRIPSY (ESWL) Bilateral 10/18/2017    Procedure: EXTRACORPOREAL SHOCK WAVE LITHOTRIPSY (ESWL);  BILATERAL EXTRACORPOREAL SHOCKWAVE LITHOTRIPSY ;  Surgeon: Meir Torres MD;  [facility]     HC CORRECT BUNION,SIMPLE  08/11/2005    x3     HC REMV TOE BENIGN BONE LESN  08/11/2005     HEAD & NECK SURGERY  1954    From a fall     HERNIORRHAPHY INGUINAL  7/3/2013    Procedure: HERNIORRHAPHY INGUINAL;  Open Repair Inguinal hernia Right with mesh ;  Surgeon: Sam Escobar MD;  [facility]     IR GASTROSTOMY TUBE PERCUTANEOUS PLCMNT  6/7/2021     MOHS MICROGRAPHIC PROCEDURE   08/23/11    ear and chin-[facility]     OPEN REDUCTION INTERNAL FIXATION WRIST Right 7/18/2017    Procedure: OPEN REDUCTION INTERNAL FIXATION WRIST;  Right distal radius open reduction and internal fixation;  Surgeon: Pedro Blanca DO;  [facility]     RECONSTRUCT FOREFOOT WITH METATARSOPHALANGEAL (MTP) FUSION  6/10/2014    Procedure: RECONSTRUCT FOREFOOT WITH METATARSOPHALANGEAL (MTP) FUSION;  Surgeon: Ash Gonzalez DPM;  [facility]     STENT, CORONARY, DEMI       SURGICAL HISTORY OF -   1999/1974    lt knee     SURGICAL HISTORY OF -   10/2004    lithotripsy     SURGICAL HISTORY OF -   11/05    angiogram with stent     VASCULAR SURGERY  11/17/2005    Puncture of iliac artery during and angiogram     [facility] LAPAROSCOPY, SURGICAL PROSTATECTOMY, RETROPUBIC RADICAL, W/NERVE SPARING  11/30/2004    With full bilateral pelvic lymphadenectomy.  [facility].     [facility] TOTAL KNEE ARTHROPLASTY  05/01/08    Left knee       FAMILY HISTORY:  Family History   Problem Relation Age of Onset     Hypertension Father         Lived to age 87     Connective Tissue Disorder Mother         LUPUS     Heart Disease Mother         Valve replacement     Anxiety Disorder Mother         Lived to age 84     Dementia Mother         Nursing Home (lived to age 86)       SOCIAL HISTORY:  Social History     Socioeconomic History     Marital status:      Spouse name: Alessandra     Number of children: 2     Years of education: None     Highest education level: None   Occupational History     Occupation: Retired   Tobacco Use     Smoking status: Never     Smokeless tobacco: Never   Vaping Use     Vaping Use: Never used   Substance and Sexual Activity     Alcohol use: Yes     Alcohol/week: 10.0 standard drinks     Types: 10 Cans of beer per week     Comment: 1 hard  liquid and 1 wine or beer per day     Drug use: No     Sexual activity: Yes     Partners: Female     Birth control/protection: Female Surgical   Other Topics  "Concern     Parent/sibling w/ CABG, MI or angioplasty before 65F 55M? No       Review of Systems:  Skin:          Eyes:         ENT:         Respiratory:  Negative shortness of breath;cough;wheezing     Cardiovascular:  Negative;palpitations;chest pain;edema;lightheadedness;dizziness;syncope or near-syncope      Gastroenterology: Positive for diarrhea    Genitourinary:         Musculoskeletal:         Neurologic:  Negative numbness or tingling of hands;numbness or tingling of feet    Psychiatric:         Heme/Lymph/Imm:  Positive for allergies    Endocrine:           Physical Exam:  Vitals: /68 (BP Location: Right arm, Patient Position: Sitting, Cuff Size: Adult Large)   Pulse 68   Ht 1.727 m (5' 8\")   Wt 92.3 kg (203 lb 6.4 oz)   SpO2 96%   BMI 30.93 kg/m      Constitutional:           Skin:             Head:           Eyes:           Lymph:      ENT:           Neck:           Respiratory:            Cardiac:                                                           GI:           Extremities and Muscular Skeletal:                 Neurological:           Psych:           CC  Aramis Ch MD  [address]  [place] [address]                Service Date: 11/01/2022    HISTORY OF PRESENT ILLNESS:  I had the opportunity to see Mr. Quan Murphy in Cardiology Clinic today at [facility] in [place] for reevaluation of coronary artery disease and cardiac risk factors including hypertension and dyslipidemia.      He is a 72-year-old gentleman who had left shoulder and left arm discomfort in 2016 and found to have severe proximal LAD disease, treated with angioplasty and stenting.  Since then, he has done very well from a cardiac standpoint.      He did have some chest discomfort last spring and underwent stress testing with nuclear imaging, which looked normal.  On 10/27/2022, he also underwent an echocardiogram, which looked normal as well.  His left ventricular size and function " was normal with an ejection fraction of 55%-60% with no significant valvular disease.  Mild aortic regurgitation and mild mitral regurgitation were noted.    His basic metabolic panel and cholesterol numbers are normal with an LDL of 48, HDL 47 and ALT was normal at 60.    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  He is hoping for some new medication that might help improve his success rate.    PHYSICAL EXAMINATION:  On examination here today, his blood pressure was 104/68, heart rate 68 and weight 203 pounds.  His lungs are clear.  Heart rhythm is regular.  There are no cardiac murmurs or carotid bruits.    IMPRESSIONS:  Mr. Chucho Antonio is a 72-year-old gentleman with hypertension, dyslipidemia and coronary artery disease with a history of stenting of his LAD back in 2016.  He has normal left ventricular size and function and a normal stress test as recently as spring of 2022.  He has no concerning cardiac symptoms recently.    I am hopeful that he can get some effective treatment for his throat cancer.  From a cardiac standpoint, he will be at low risk to undergo any kind of surgical procedure that might be necessary without any additional cardiac testing or treatment at this time.  I would recommend that he continue his current dose of metoprolol succinate throughout the perioperative period in order to keep his risk low.    We will continue to follow up with him in Cardiology Clinic in 6 months for reevaluation.    cc:   [place] [address]     Aiden Alexandre MD, [facility]        D: 2022   T: 2022   MT:     Name:     CHUCHO MONTOYA  MRN:      [id number]        Account:      [id number]   :      1950           Service Date: 2022       Document: [id number]      Thank you for allowing me to participate in the care of your patient.      Sincerely,     AIDEN ALEXANDRE MD      [facility]  cc:   Aramis Ch MD  [address]  [place] [id number]

## 2022-11-09 ENCOUNTER — TELEPHONE (OUTPATIENT)
Dept: FAMILY MEDICINE | Facility: CLINIC | Age: 72
End: 2022-11-09

## 2022-11-09 DIAGNOSIS — C09.9 SQUAMOUS CELL CARCINOMA OF LEFT TONSIL (H): Primary | ICD-10-CM

## 2022-11-09 DIAGNOSIS — N18.30 STAGE 3 CHRONIC KIDNEY DISEASE, UNSPECIFIED WHETHER STAGE 3A OR 3B CKD (H): ICD-10-CM

## 2022-11-09 NOTE — TELEPHONE ENCOUNTER
Michael Fazer, NP at [facility] is calling to request orders.    She cares for patient through [place] for infusion treatment and she would like to have us order a CMP to keep an eye on his kidneys.  She states she cannot order these for patient, but can see the results through Care Everywhere.      Michael: [phone], in case there are questions.

## 2022-11-13 ENCOUNTER — MYC MEDICAL ADVICE (OUTPATIENT)
Dept: FAMILY MEDICINE | Facility: CLINIC | Age: 72
End: 2022-11-13

## 2022-11-14 ENCOUNTER — LAB (OUTPATIENT)
Dept: LAB | Facility: CLINIC | Age: 72
End: 2022-11-14
Payer: MEDICARE

## 2022-11-14 DIAGNOSIS — N18.30 STAGE 3 CHRONIC KIDNEY DISEASE, UNSPECIFIED WHETHER STAGE 3A OR 3B CKD (H): ICD-10-CM

## 2022-11-14 LAB
ALBUMIN SERPL-MCNC: 3 G/DL (ref 3.4–5)
ALP SERPL-CCNC: 243 U/L (ref 40–150)
ALT SERPL W P-5'-P-CCNC: 167 U/L (ref 0–70)
ANION GAP SERPL CALCULATED.3IONS-SCNC: 5 MMOL/L (ref 3–14)
AST SERPL W P-5'-P-CCNC: 109 U/L (ref 0–45)
BILIRUB SERPL-MCNC: 0.5 MG/DL (ref 0.2–1.3)
BUN SERPL-MCNC: 11 MG/DL (ref 7–30)
CALCIUM SERPL-MCNC: 8.7 MG/DL (ref 8.5–10.1)
CHLORIDE BLD-SCNC: 103 MMOL/L (ref 94–109)
CO2 SERPL-SCNC: 27 MMOL/L (ref 20–32)
CREAT SERPL-MCNC: 1 MG/DL (ref 0.66–1.25)
GFR SERPL CREATININE-BSD FRML MDRD: 80 ML/MIN/1.73M2
GLUCOSE BLD-MCNC: 112 MG/DL (ref 70–99)
POTASSIUM BLD-SCNC: 3.3 MMOL/L (ref 3.4–5.3)
PROT SERPL-MCNC: 6.4 G/DL (ref 6.8–8.8)
SODIUM SERPL-SCNC: 135 MMOL/L (ref 133–144)

## 2022-11-14 PROCEDURE — 80053 COMPREHEN METABOLIC PANEL: CPT

## 2022-11-14 PROCEDURE — 36415 COLL VENOUS BLD VENIPUNCTURE: CPT

## 2022-11-14 NOTE — TELEPHONE ENCOUNTER
Patient has orders for Lab work taken care of and he is going to the lab today.     Please address the question about IV Fluids through infusion therapy and patient will be contacted for scheduling.    Bri Oshea XRO/

## 2022-11-15 DIAGNOSIS — E78.5 HYPERLIPIDEMIA LDL GOAL <130: ICD-10-CM

## 2022-11-15 RX ORDER — ROSUVASTATIN CALCIUM 40 MG/1
40 TABLET, COATED ORAL DAILY
Qty: 90 TABLET | Refills: 3 | Status: SHIPPED | OUTPATIENT
Start: 2022-11-15 | End: 2023-01-01

## 2022-11-15 NOTE — TELEPHONE ENCOUNTER
Last Written Prescription Date:  11/22/21  Last Fill Quantity: 90,  # refills: 3   Last office visit: Visit date not found with prescribing provider:  11/1/22   Future Office Visit:      Follow up not scheduled yet. Next appointment due around 5/1/22.     Changed pharmacy to [facility] instead of [facility].     Routing to Cardiology RN to please advise.     Sudha Toure on 11/15/2022 at 8:46 AM

## 2022-11-15 NOTE — TELEPHONE ENCOUNTER
Reason for Call:  Other other    Detailed comments: Pt needs to switch pharmacies to the [facility] from [facility]. Also needs a refill on rosuvastatin (CRESTOR) 40 MG tablet    Phone Number Patient can be reached at: Home number on file [phone] (home)    Best Time: any    Can we leave a detailed message on this number? YES    Call taken on 11/15/2022 at 8:38 AM by Matilda Kate

## 2022-11-16 DIAGNOSIS — Z79.899 HIGH RISK MEDICATION USE: ICD-10-CM

## 2022-11-16 DIAGNOSIS — C79.51 SECONDARY MALIGNANT NEOPLASM OF BONE AND BONE MARROW (H): ICD-10-CM

## 2022-11-16 DIAGNOSIS — C79.52 SECONDARY MALIGNANT NEOPLASM OF BONE AND BONE MARROW (H): ICD-10-CM

## 2022-11-16 DIAGNOSIS — C76.0 MALIGNANT NEOPLASM OF HEAD, FACE, AND NECK (H): Primary | ICD-10-CM

## 2022-11-16 NOTE — TELEPHONE ENCOUNTER
Let him know I cannot place standing orders for IV infusions because they have to meet certain criteria based on lab work and clinical presentation.  There are different IV fluids that are used as well.  If he is at the point of dehydration with his complex medical issues he would just have to present to the emergency room here for consideration and monitoring during the infusion process.

## 2022-11-18 ENCOUNTER — LAB (OUTPATIENT)
Dept: LAB | Facility: CLINIC | Age: 72
End: 2022-11-18
Payer: MEDICARE

## 2022-11-18 ENCOUNTER — OFFICE VISIT (OUTPATIENT)
Dept: FAMILY MEDICINE | Facility: CLINIC | Age: 72
End: 2022-11-18
Payer: MEDICARE

## 2022-11-18 VITALS
HEART RATE: 65 BPM | WEIGHT: 195.1 LBS | OXYGEN SATURATION: 97 % | RESPIRATION RATE: 16 BRPM | BODY MASS INDEX: 29.57 KG/M2 | SYSTOLIC BLOOD PRESSURE: 118 MMHG | TEMPERATURE: 97.1 F | DIASTOLIC BLOOD PRESSURE: 62 MMHG | HEIGHT: 68 IN

## 2022-11-18 DIAGNOSIS — Z79.899 ENCOUNTER FOR LONG-TERM (CURRENT) USE OF MEDICATIONS: ICD-10-CM

## 2022-11-18 DIAGNOSIS — C79.51 SECONDARY MALIGNANT NEOPLASM OF BONE AND BONE MARROW (H): ICD-10-CM

## 2022-11-18 DIAGNOSIS — C09.9 SQUAMOUS CELL CARCINOMA OF LEFT TONSIL (H): Primary | ICD-10-CM

## 2022-11-18 DIAGNOSIS — Z79.899 HIGH RISK MEDICATION USE: ICD-10-CM

## 2022-11-18 DIAGNOSIS — C79.52 SECONDARY MALIGNANT NEOPLASM OF BONE AND BONE MARROW (H): ICD-10-CM

## 2022-11-18 DIAGNOSIS — C76.0 MALIGNANT NEOPLASM OF HEAD, FACE, AND NECK (H): ICD-10-CM

## 2022-11-18 LAB
ALBUMIN SERPL-MCNC: 3.1 G/DL (ref 3.4–5)
ALP SERPL-CCNC: 197 U/L (ref 40–150)
ALT SERPL W P-5'-P-CCNC: 118 U/L (ref 0–70)
ANION GAP SERPL CALCULATED.3IONS-SCNC: 6 MMOL/L (ref 3–14)
AST SERPL W P-5'-P-CCNC: 51 U/L (ref 0–45)
BILIRUB SERPL-MCNC: 0.4 MG/DL (ref 0.2–1.3)
BUN SERPL-MCNC: 16 MG/DL (ref 7–30)
CALCIUM SERPL-MCNC: 9 MG/DL (ref 8.5–10.1)
CHLORIDE BLD-SCNC: 102 MMOL/L (ref 94–109)
CO2 SERPL-SCNC: 27 MMOL/L (ref 20–32)
CREAT SERPL-MCNC: 1.08 MG/DL (ref 0.66–1.25)
GFR SERPL CREATININE-BSD FRML MDRD: 73 ML/MIN/1.73M2
GLUCOSE BLD-MCNC: 104 MG/DL (ref 70–99)
POTASSIUM BLD-SCNC: 3.4 MMOL/L (ref 3.4–5.3)
PROT SERPL-MCNC: 6.9 G/DL (ref 6.8–8.8)
SODIUM SERPL-SCNC: 135 MMOL/L (ref 133–144)

## 2022-11-18 PROCEDURE — 99214 OFFICE O/P EST MOD 30 MIN: CPT | Performed by: FAMILY MEDICINE

## 2022-11-18 PROCEDURE — 36415 COLL VENOUS BLD VENIPUNCTURE: CPT

## 2022-11-18 PROCEDURE — 80053 COMPREHEN METABOLIC PANEL: CPT

## 2022-11-18 ASSESSMENT — ANXIETY QUESTIONNAIRES
3. WORRYING TOO MUCH ABOUT DIFFERENT THINGS: NOT AT ALL
GAD7 TOTAL SCORE: 1
2. NOT BEING ABLE TO STOP OR CONTROL WORRYING: NOT AT ALL
IF YOU CHECKED OFF ANY PROBLEMS ON THIS QUESTIONNAIRE, HOW DIFFICULT HAVE THESE PROBLEMS MADE IT FOR YOU TO DO YOUR WORK, TAKE CARE OF THINGS AT HOME, OR GET ALONG WITH OTHER PEOPLE: SOMEWHAT DIFFICULT
5. BEING SO RESTLESS THAT IT IS HARD TO SIT STILL: NOT AT ALL
GAD7 TOTAL SCORE: 1
4. TROUBLE RELAXING: SEVERAL DAYS
6. BECOMING EASILY ANNOYED OR IRRITABLE: NOT AT ALL
1. FEELING NERVOUS, ANXIOUS, OR ON EDGE: NOT AT ALL
7. FEELING AFRAID AS IF SOMETHING AWFUL MIGHT HAPPEN: NOT AT ALL
7. FEELING AFRAID AS IF SOMETHING AWFUL MIGHT HAPPEN: NOT AT ALL
8. IF YOU CHECKED OFF ANY PROBLEMS, HOW DIFFICULT HAVE THESE MADE IT FOR YOU TO DO YOUR WORK, TAKE CARE OF THINGS AT HOME, OR GET ALONG WITH OTHER PEOPLE?: SOMEWHAT DIFFICULT

## 2022-11-18 ASSESSMENT — PAIN SCALES - GENERAL: PAINLEVEL: NO PAIN (0)

## 2022-11-18 NOTE — PROGRESS NOTES
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  Now on chemotherapy.  he has lost his hair.  Having a lot of trouble with constipation because they have switched him from Norco to oxycodone.  Norco was stopped because of the acetaminophen content as he was having increased liver enzymes.  These are actually coming back down now.  Test today continued the downward trend.    Encounter for long-term (current) use of medications  Sounds like he is limiting his oxycodone to 6 a day and seems to be holding him for his discomfort.  Continue to follow along with the [facility].  Because of his constipation no recommended half a dose of colonoscopy prep using MiraLAX and Gatorade.  He has pretty much had only clear liquids today.  We will see if this helps loosen things up.  Then he will use some MiraLAX regularly.               BMI:   Estimated body mass index is 29.66 kg/m  as calculated from the following:    Height as of this encounter: 1.727 m (5' 8\").    Weight as of this encounter: 88.5 kg (195 lb 1.6 oz).           No follow-ups on file.    Jose Hernandez MD  [facility]    Claribel Wolff is a 72 year old, presenting for the following health issues:  Discuss transferring to a new provider (Dr. Owens) (Discuss HPV)  Needs to set up an establish care with another provider here.  Also some advice in managing his constipation he feels he is really getting backed up the last couple of days.    History of Present Illness       Reason for visit:  Discuss transition to another doctor, pending correction    He eats 0-1 servings of fruits and vegetables daily.He consumes 2 sweetened beverage(s) daily.He exercises with enough effort to increase his heart rate 9 or less minutes per day.  He exercises with enough effort to increase his heart rate 3 or less days per week.   He is taking medications regularly.  Today's AME-7 Score: 1   " "          Review of Systems   Constitutional, HEENT, cardiovascular, pulmonary, gi and gu systems are negative, except as otherwise noted.      Objective    /62 (BP Location: Right arm, Patient Position: Sitting, Cuff Size: Adult Regular)   Pulse 65   Temp 97.1  F (36.2  C) (Temporal)   Resp 16   Ht 1.727 m (5' 8\")   Wt 88.5 kg (195 lb 1.6 oz)   SpO2 97%   BMI 29.66 kg/m    Body mass index is 29.66 kg/m .  Physical Exam   GENERAL: healthy, alert and no distress  EYES: Eyes grossly normal to inspection, PERRL and conjunctivae and sclerae normal  MS: no gross musculoskeletal defects noted, no edema  NEURO: Normal strength and tone, mentation intact and speech normal  PSYCH: mentation appears normal, affect flat and fatigued                    "

## 2022-11-18 NOTE — LETTER

## 2022-11-20 DIAGNOSIS — F41.9 ANXIETY: ICD-10-CM

## 2022-11-22 ENCOUNTER — LAB (OUTPATIENT)
Dept: LAB | Facility: CLINIC | Age: 72
End: 2022-11-22
Payer: MEDICARE

## 2022-11-22 DIAGNOSIS — Z79.899 HIGH RISK MEDICATION USE: ICD-10-CM

## 2022-11-22 DIAGNOSIS — C76.0 MALIGNANT NEOPLASM OF HEAD, FACE, AND NECK (H): ICD-10-CM

## 2022-11-22 DIAGNOSIS — C79.51 SECONDARY MALIGNANT NEOPLASM OF BONE AND BONE MARROW (H): ICD-10-CM

## 2022-11-22 DIAGNOSIS — C79.52 SECONDARY MALIGNANT NEOPLASM OF BONE AND BONE MARROW (H): ICD-10-CM

## 2022-11-22 LAB
ALBUMIN SERPL-MCNC: 3.3 G/DL (ref 3.4–5)
ALP SERPL-CCNC: 164 U/L (ref 40–150)
ALT SERPL W P-5'-P-CCNC: 149 U/L (ref 0–70)
ANION GAP SERPL CALCULATED.3IONS-SCNC: 4 MMOL/L (ref 3–14)
AST SERPL W P-5'-P-CCNC: 56 U/L (ref 0–45)
BILIRUB SERPL-MCNC: 0.6 MG/DL (ref 0.2–1.3)
BUN SERPL-MCNC: 18 MG/DL (ref 7–30)
CALCIUM SERPL-MCNC: 9.1 MG/DL (ref 8.5–10.1)
CHLORIDE BLD-SCNC: 100 MMOL/L (ref 94–109)
CO2 SERPL-SCNC: 31 MMOL/L (ref 20–32)
CREAT SERPL-MCNC: 1.26 MG/DL (ref 0.66–1.25)
GFR SERPL CREATININE-BSD FRML MDRD: 61 ML/MIN/1.73M2
GLUCOSE BLD-MCNC: 98 MG/DL (ref 70–99)
POTASSIUM BLD-SCNC: 4.3 MMOL/L (ref 3.4–5.3)
PROT SERPL-MCNC: 6.8 G/DL (ref 6.8–8.8)
SODIUM SERPL-SCNC: 135 MMOL/L (ref 133–144)

## 2022-11-22 PROCEDURE — 36415 COLL VENOUS BLD VENIPUNCTURE: CPT

## 2022-11-22 PROCEDURE — 80053 COMPREHEN METABOLIC PANEL: CPT

## 2022-11-22 RX ORDER — ALPRAZOLAM 0.5 MG
0.5 TABLET ORAL 4 TIMES DAILY PRN
Qty: 120 TABLET | Refills: 0 | Status: SHIPPED | OUTPATIENT
Start: 2022-11-22 | End: 2023-01-03

## 2022-11-22 RX ORDER — BUPROPION HYDROCHLORIDE 150 MG/1
TABLET ORAL
Qty: 30 TABLET | Refills: 1 | Status: SHIPPED | OUTPATIENT
Start: 2022-11-22 | End: 2023-02-08

## 2022-11-23 DIAGNOSIS — C79.51 SECONDARY MALIGNANT NEOPLASM OF BONE AND BONE MARROW (H): ICD-10-CM

## 2022-11-23 DIAGNOSIS — C79.52 SECONDARY MALIGNANT NEOPLASM OF BONE AND BONE MARROW (H): ICD-10-CM

## 2022-11-23 DIAGNOSIS — C76.0 MALIGNANT NEOPLASM OF HEAD, FACE, AND NECK (H): Primary | ICD-10-CM

## 2022-11-23 DIAGNOSIS — Z79.899 ENCOUNTER FOR LONG-TERM (CURRENT) USE OF MEDICATIONS: ICD-10-CM

## 2022-11-25 ENCOUNTER — LAB (OUTPATIENT)
Dept: LAB | Facility: CLINIC | Age: 72
End: 2022-11-25
Payer: MEDICARE

## 2022-11-25 DIAGNOSIS — C79.51 SECONDARY MALIGNANT NEOPLASM OF BONE AND BONE MARROW (H): ICD-10-CM

## 2022-11-25 DIAGNOSIS — C76.0 MALIGNANT NEOPLASM OF HEAD, FACE, AND NECK (H): ICD-10-CM

## 2022-11-25 DIAGNOSIS — C79.52 SECONDARY MALIGNANT NEOPLASM OF BONE AND BONE MARROW (H): ICD-10-CM

## 2022-11-25 LAB
ALBUMIN SERPL-MCNC: 3 G/DL (ref 3.4–5)
ALP SERPL-CCNC: 125 U/L (ref 40–150)
ALT SERPL W P-5'-P-CCNC: 131 U/L (ref 0–70)
ANION GAP SERPL CALCULATED.3IONS-SCNC: 6 MMOL/L (ref 3–14)
AST SERPL W P-5'-P-CCNC: 54 U/L (ref 0–45)
BILIRUB SERPL-MCNC: 0.2 MG/DL (ref 0.2–1.3)
BUN SERPL-MCNC: 18 MG/DL (ref 7–30)
CALCIUM SERPL-MCNC: 8.6 MG/DL (ref 8.5–10.1)
CHLORIDE BLD-SCNC: 102 MMOL/L (ref 94–109)
CO2 SERPL-SCNC: 27 MMOL/L (ref 20–32)
CREAT SERPL-MCNC: 1.2 MG/DL (ref 0.66–1.25)
GFR SERPL CREATININE-BSD FRML MDRD: 65 ML/MIN/1.73M2
GLUCOSE BLD-MCNC: 114 MG/DL (ref 70–99)
POTASSIUM BLD-SCNC: 4.6 MMOL/L (ref 3.4–5.3)
PROT SERPL-MCNC: 6.2 G/DL (ref 6.8–8.8)
SODIUM SERPL-SCNC: 135 MMOL/L (ref 133–144)

## 2022-11-25 PROCEDURE — 80053 COMPREHEN METABOLIC PANEL: CPT

## 2022-11-25 PROCEDURE — 36415 COLL VENOUS BLD VENIPUNCTURE: CPT

## 2022-11-28 ENCOUNTER — LAB (OUTPATIENT)
Dept: LAB | Facility: CLINIC | Age: 72
End: 2022-11-28
Payer: MEDICARE

## 2022-11-28 DIAGNOSIS — C79.51 SECONDARY MALIGNANT NEOPLASM OF BONE AND BONE MARROW (H): ICD-10-CM

## 2022-11-28 DIAGNOSIS — C79.52 SECONDARY MALIGNANT NEOPLASM OF BONE AND BONE MARROW (H): ICD-10-CM

## 2022-11-28 DIAGNOSIS — Z79.899 ENCOUNTER FOR LONG-TERM (CURRENT) USE OF MEDICATIONS: ICD-10-CM

## 2022-11-28 DIAGNOSIS — C76.0 MALIGNANT NEOPLASM OF HEAD, FACE, AND NECK (H): ICD-10-CM

## 2022-11-28 LAB
ALBUMIN SERPL-MCNC: 3 G/DL (ref 3.4–5)
ALP SERPL-CCNC: 105 U/L (ref 40–150)
ALT SERPL W P-5'-P-CCNC: 151 U/L (ref 0–70)
ANION GAP SERPL CALCULATED.3IONS-SCNC: 8 MMOL/L (ref 3–14)
AST SERPL W P-5'-P-CCNC: 69 U/L (ref 0–45)
BASOPHILS # BLD AUTO: 0 10E3/UL (ref 0–0.2)
BASOPHILS NFR BLD AUTO: 0 %
BILIRUB DIRECT SERPL-MCNC: 0.1 MG/DL (ref 0–0.2)
BILIRUB SERPL-MCNC: 0.3 MG/DL (ref 0.2–1.3)
BUN SERPL-MCNC: 15 MG/DL (ref 7–30)
CALCIUM SERPL-MCNC: 8.6 MG/DL (ref 8.5–10.1)
CHLORIDE BLD-SCNC: 101 MMOL/L (ref 94–109)
CO2 SERPL-SCNC: 27 MMOL/L (ref 20–32)
CREAT SERPL-MCNC: 1.21 MG/DL (ref 0.66–1.25)
EOSINOPHIL # BLD AUTO: 0 10E3/UL (ref 0–0.7)
EOSINOPHIL NFR BLD AUTO: 0 %
ERYTHROCYTE [DISTWIDTH] IN BLOOD BY AUTOMATED COUNT: 14.4 % (ref 10–15)
GFR SERPL CREATININE-BSD FRML MDRD: 64 ML/MIN/1.73M2
GLUCOSE BLD-MCNC: 97 MG/DL (ref 70–99)
HCT VFR BLD AUTO: 37.1 % (ref 40–53)
HGB BLD-MCNC: 12 G/DL (ref 13.3–17.7)
IMM GRANULOCYTES # BLD: 0.1 10E3/UL
IMM GRANULOCYTES NFR BLD: 1 %
LDH SERPL L TO P-CCNC: 205 U/L (ref 85–227)
LYMPHOCYTES # BLD AUTO: 1.4 10E3/UL (ref 0.8–5.3)
LYMPHOCYTES NFR BLD AUTO: 24 %
MCH RBC QN AUTO: 30.6 PG (ref 26.5–33)
MCHC RBC AUTO-ENTMCNC: 32.3 G/DL (ref 31.5–36.5)
MCV RBC AUTO: 95 FL (ref 78–100)
MONOCYTES # BLD AUTO: 0.8 10E3/UL (ref 0–1.3)
MONOCYTES NFR BLD AUTO: 13 %
NEUTROPHILS # BLD AUTO: 3.5 10E3/UL (ref 1.6–8.3)
NEUTROPHILS NFR BLD AUTO: 62 %
NRBC # BLD AUTO: 0 10E3/UL
NRBC BLD AUTO-RTO: 0 /100
PLATELET # BLD AUTO: 291 10E3/UL (ref 150–450)
POTASSIUM BLD-SCNC: 3.9 MMOL/L (ref 3.4–5.3)
PROT SERPL-MCNC: 6 G/DL (ref 6.8–8.8)
RBC # BLD AUTO: 3.92 10E6/UL (ref 4.4–5.9)
SODIUM SERPL-SCNC: 136 MMOL/L (ref 133–144)
T3 SERPL-MCNC: 47 NG/DL (ref 85–202)
T4 FREE SERPL-MCNC: 0.91 NG/DL (ref 0.76–1.46)
TSH SERPL DL<=0.005 MIU/L-ACNC: 3.96 MU/L (ref 0.4–4)
WBC # BLD AUTO: 5.7 10E3/UL (ref 4–11)

## 2022-11-28 PROCEDURE — 82248 BILIRUBIN DIRECT: CPT

## 2022-11-28 PROCEDURE — 36415 COLL VENOUS BLD VENIPUNCTURE: CPT

## 2022-11-28 PROCEDURE — 86376 MICROSOMAL ANTIBODY EACH: CPT

## 2022-11-28 PROCEDURE — 84443 ASSAY THYROID STIM HORMONE: CPT

## 2022-11-28 PROCEDURE — 84439 ASSAY OF FREE THYROXINE: CPT

## 2022-11-28 PROCEDURE — 84480 ASSAY TRIIODOTHYRONINE (T3): CPT

## 2022-11-28 PROCEDURE — 80053 COMPREHEN METABOLIC PANEL: CPT

## 2022-11-28 PROCEDURE — 85025 COMPLETE CBC W/AUTO DIFF WBC: CPT

## 2022-11-28 PROCEDURE — 83615 LACTATE (LD) (LDH) ENZYME: CPT

## 2022-11-29 LAB — THYROPEROXIDASE AB SERPL-ACNC: 93 IU/ML

## 2022-12-02 ENCOUNTER — LAB (OUTPATIENT)
Dept: LAB | Facility: CLINIC | Age: 72
End: 2022-12-02
Payer: MEDICARE

## 2022-12-02 DIAGNOSIS — C76.0 MALIGNANT NEOPLASM OF HEAD, FACE, AND NECK (H): ICD-10-CM

## 2022-12-02 DIAGNOSIS — C79.51 SECONDARY MALIGNANT NEOPLASM OF BONE AND BONE MARROW (H): ICD-10-CM

## 2022-12-02 DIAGNOSIS — C76.0 MALIGNANT NEOPLASM OF HEAD, FACE, AND NECK (H): Primary | ICD-10-CM

## 2022-12-02 DIAGNOSIS — Z79.899 HIGH RISK MEDICATION USE: ICD-10-CM

## 2022-12-02 DIAGNOSIS — C79.52 SECONDARY MALIGNANT NEOPLASM OF BONE AND BONE MARROW (H): ICD-10-CM

## 2022-12-02 LAB
ALBUMIN SERPL-MCNC: 2.8 G/DL (ref 3.4–5)
ALP SERPL-CCNC: 84 U/L (ref 40–150)
ALT SERPL W P-5'-P-CCNC: 78 U/L (ref 0–70)
ANION GAP SERPL CALCULATED.3IONS-SCNC: 7 MMOL/L (ref 3–14)
AST SERPL W P-5'-P-CCNC: 30 U/L (ref 0–45)
BILIRUB SERPL-MCNC: 0.4 MG/DL (ref 0.2–1.3)
BUN SERPL-MCNC: 17 MG/DL (ref 7–30)
CALCIUM SERPL-MCNC: 8.3 MG/DL (ref 8.5–10.1)
CHLORIDE BLD-SCNC: 105 MMOL/L (ref 94–109)
CO2 SERPL-SCNC: 25 MMOL/L (ref 20–32)
CREAT SERPL-MCNC: 1.06 MG/DL (ref 0.66–1.25)
GFR SERPL CREATININE-BSD FRML MDRD: 75 ML/MIN/1.73M2
GLUCOSE BLD-MCNC: 109 MG/DL (ref 70–99)
POTASSIUM BLD-SCNC: 4 MMOL/L (ref 3.4–5.3)
PROT SERPL-MCNC: 5.9 G/DL (ref 6.8–8.8)
SODIUM SERPL-SCNC: 137 MMOL/L (ref 133–144)

## 2022-12-02 PROCEDURE — 36415 COLL VENOUS BLD VENIPUNCTURE: CPT

## 2022-12-02 PROCEDURE — 80053 COMPREHEN METABOLIC PANEL: CPT

## 2022-12-06 ENCOUNTER — LAB (OUTPATIENT)
Dept: LAB | Facility: CLINIC | Age: 72
End: 2022-12-06
Payer: MEDICARE

## 2022-12-06 DIAGNOSIS — C79.51 SECONDARY MALIGNANT NEOPLASM OF BONE AND BONE MARROW (H): ICD-10-CM

## 2022-12-06 DIAGNOSIS — C79.52 SECONDARY MALIGNANT NEOPLASM OF BONE AND BONE MARROW (H): ICD-10-CM

## 2022-12-06 DIAGNOSIS — Z79.899 HIGH RISK MEDICATION USE: ICD-10-CM

## 2022-12-06 DIAGNOSIS — C76.0 MALIGNANT NEOPLASM OF HEAD, FACE, AND NECK (H): ICD-10-CM

## 2022-12-06 LAB
ALBUMIN SERPL-MCNC: 2.5 G/DL (ref 3.4–5)
ALP SERPL-CCNC: 228 U/L (ref 40–150)
ALT SERPL W P-5'-P-CCNC: 214 U/L (ref 0–70)
ANION GAP SERPL CALCULATED.3IONS-SCNC: 4 MMOL/L (ref 3–14)
AST SERPL W P-5'-P-CCNC: 143 U/L (ref 0–45)
BILIRUB SERPL-MCNC: 0.5 MG/DL (ref 0.2–1.3)
BUN SERPL-MCNC: 15 MG/DL (ref 7–30)
CALCIUM SERPL-MCNC: 7.7 MG/DL (ref 8.5–10.1)
CHLORIDE BLD-SCNC: 100 MMOL/L (ref 94–109)
CO2 SERPL-SCNC: 30 MMOL/L (ref 20–32)
CREAT SERPL-MCNC: 1.01 MG/DL (ref 0.66–1.25)
GFR SERPL CREATININE-BSD FRML MDRD: 80 ML/MIN/1.73M2
GLUCOSE BLD-MCNC: 115 MG/DL (ref 70–99)
POTASSIUM BLD-SCNC: 4.3 MMOL/L (ref 3.4–5.3)
PROT SERPL-MCNC: 5.8 G/DL (ref 6.8–8.8)
SODIUM SERPL-SCNC: 134 MMOL/L (ref 133–144)

## 2022-12-06 PROCEDURE — 80053 COMPREHEN METABOLIC PANEL: CPT

## 2022-12-06 PROCEDURE — 36415 COLL VENOUS BLD VENIPUNCTURE: CPT

## 2022-12-09 ENCOUNTER — LAB (OUTPATIENT)
Dept: LAB | Facility: CLINIC | Age: 72
End: 2022-12-09
Payer: MEDICARE

## 2022-12-09 DIAGNOSIS — C79.52 SECONDARY MALIGNANT NEOPLASM OF BONE AND BONE MARROW (H): ICD-10-CM

## 2022-12-09 DIAGNOSIS — C79.51 SECONDARY MALIGNANT NEOPLASM OF BONE AND BONE MARROW (H): ICD-10-CM

## 2022-12-09 DIAGNOSIS — Z79.899 HIGH RISK MEDICATION USE: ICD-10-CM

## 2022-12-09 DIAGNOSIS — C76.0 MALIGNANT NEOPLASM OF HEAD, FACE, AND NECK (H): ICD-10-CM

## 2022-12-09 LAB
ALBUMIN SERPL-MCNC: 2.5 G/DL (ref 3.4–5)
ALP SERPL-CCNC: 219 U/L (ref 40–150)
ALT SERPL W P-5'-P-CCNC: 136 U/L (ref 0–70)
ANION GAP SERPL CALCULATED.3IONS-SCNC: 7 MMOL/L (ref 3–14)
AST SERPL W P-5'-P-CCNC: 42 U/L (ref 0–45)
BILIRUB SERPL-MCNC: 0.3 MG/DL (ref 0.2–1.3)
BUN SERPL-MCNC: 17 MG/DL (ref 7–30)
CALCIUM SERPL-MCNC: 8.1 MG/DL (ref 8.5–10.1)
CHLORIDE BLD-SCNC: 100 MMOL/L (ref 94–109)
CO2 SERPL-SCNC: 25 MMOL/L (ref 20–32)
CREAT SERPL-MCNC: 1.1 MG/DL (ref 0.66–1.25)
CRP SERPL-MCNC: 13.8 MG/L (ref 0–8)
GFR SERPL CREATININE-BSD FRML MDRD: 72 ML/MIN/1.73M2
GLUCOSE BLD-MCNC: 109 MG/DL (ref 70–99)
POTASSIUM BLD-SCNC: 4.4 MMOL/L (ref 3.4–5.3)
PROT SERPL-MCNC: 5.8 G/DL (ref 6.8–8.8)
SODIUM SERPL-SCNC: 132 MMOL/L (ref 133–144)

## 2022-12-09 PROCEDURE — 86431 RHEUMATOID FACTOR QUANT: CPT

## 2022-12-09 PROCEDURE — 36415 COLL VENOUS BLD VENIPUNCTURE: CPT

## 2022-12-09 PROCEDURE — 80053 COMPREHEN METABOLIC PANEL: CPT

## 2022-12-09 PROCEDURE — 86140 C-REACTIVE PROTEIN: CPT

## 2022-12-12 ENCOUNTER — LAB (OUTPATIENT)
Dept: LAB | Facility: CLINIC | Age: 72
End: 2022-12-12
Payer: MEDICARE

## 2022-12-12 DIAGNOSIS — C79.51 SECONDARY MALIGNANT NEOPLASM OF BONE AND BONE MARROW (H): ICD-10-CM

## 2022-12-12 DIAGNOSIS — Z79.899 HIGH RISK MEDICATION USE: ICD-10-CM

## 2022-12-12 DIAGNOSIS — Z79.899 ENCOUNTER FOR LONG-TERM (CURRENT) USE OF MEDICATIONS: ICD-10-CM

## 2022-12-12 DIAGNOSIS — C76.0 MALIGNANT NEOPLASM OF HEAD, FACE, AND NECK (H): ICD-10-CM

## 2022-12-12 DIAGNOSIS — C79.52 SECONDARY MALIGNANT NEOPLASM OF BONE AND BONE MARROW (H): ICD-10-CM

## 2022-12-12 LAB
ALBUMIN SERPL-MCNC: 2.6 G/DL (ref 3.4–5)
ALP SERPL-CCNC: 141 U/L (ref 40–150)
ALT SERPL W P-5'-P-CCNC: 76 U/L (ref 0–70)
ANION GAP SERPL CALCULATED.3IONS-SCNC: 4 MMOL/L (ref 3–14)
AST SERPL W P-5'-P-CCNC: 21 U/L (ref 0–45)
BASOPHILS # BLD AUTO: 0 10E3/UL (ref 0–0.2)
BASOPHILS NFR BLD AUTO: 0 %
BILIRUB DIRECT SERPL-MCNC: 0.2 MG/DL (ref 0–0.2)
BILIRUB SERPL-MCNC: 0.4 MG/DL (ref 0.2–1.3)
BUN SERPL-MCNC: 18 MG/DL (ref 7–30)
CALCIUM SERPL-MCNC: 8.4 MG/DL (ref 8.5–10.1)
CHLORIDE BLD-SCNC: 102 MMOL/L (ref 94–109)
CO2 SERPL-SCNC: 28 MMOL/L (ref 20–32)
CREAT SERPL-MCNC: 1.14 MG/DL (ref 0.66–1.25)
EOSINOPHIL # BLD AUTO: 0 10E3/UL (ref 0–0.7)
EOSINOPHIL NFR BLD AUTO: 0 %
ERYTHROCYTE [DISTWIDTH] IN BLOOD BY AUTOMATED COUNT: 14.8 % (ref 10–15)
GFR SERPL CREATININE-BSD FRML MDRD: 69 ML/MIN/1.73M2
GLUCOSE BLD-MCNC: 132 MG/DL (ref 70–99)
HCT VFR BLD AUTO: 35.7 % (ref 40–53)
HGB BLD-MCNC: 11.6 G/DL (ref 13.3–17.7)
IMM GRANULOCYTES # BLD: 0 10E3/UL
IMM GRANULOCYTES NFR BLD: 1 %
LDH SERPL L TO P-CCNC: 179 U/L (ref 85–227)
LYMPHOCYTES # BLD AUTO: 0.4 10E3/UL (ref 0.8–5.3)
LYMPHOCYTES NFR BLD AUTO: 10 %
MCH RBC QN AUTO: 30.3 PG (ref 26.5–33)
MCHC RBC AUTO-ENTMCNC: 32.5 G/DL (ref 31.5–36.5)
MCV RBC AUTO: 93 FL (ref 78–100)
MONOCYTES # BLD AUTO: 0.2 10E3/UL (ref 0–1.3)
MONOCYTES NFR BLD AUTO: 4 %
NEUTROPHILS # BLD AUTO: 3.8 10E3/UL (ref 1.6–8.3)
NEUTROPHILS NFR BLD AUTO: 85 %
NRBC # BLD AUTO: 0 10E3/UL
NRBC BLD AUTO-RTO: 0 /100
PLATELET # BLD AUTO: 131 10E3/UL (ref 150–450)
POTASSIUM BLD-SCNC: 4.7 MMOL/L (ref 3.4–5.3)
PROT SERPL-MCNC: 5.9 G/DL (ref 6.8–8.8)
RBC # BLD AUTO: 3.83 10E6/UL (ref 4.4–5.9)
RHEUMATOID FACT SER NEPH-ACNC: 7 IU/ML
SODIUM SERPL-SCNC: 134 MMOL/L (ref 133–144)
T3 SERPL-MCNC: 36 NG/DL (ref 85–202)
T4 FREE SERPL-MCNC: 0.66 NG/DL (ref 0.76–1.46)
TSH SERPL DL<=0.005 MIU/L-ACNC: 3.39 MU/L (ref 0.4–4)
WBC # BLD AUTO: 4.5 10E3/UL (ref 4–11)

## 2022-12-12 PROCEDURE — 84480 ASSAY TRIIODOTHYRONINE (T3): CPT

## 2022-12-12 PROCEDURE — 36415 COLL VENOUS BLD VENIPUNCTURE: CPT

## 2022-12-12 PROCEDURE — 85025 COMPLETE CBC W/AUTO DIFF WBC: CPT

## 2022-12-12 PROCEDURE — 84439 ASSAY OF FREE THYROXINE: CPT

## 2022-12-12 PROCEDURE — 83615 LACTATE (LD) (LDH) ENZYME: CPT

## 2022-12-12 PROCEDURE — 86376 MICROSOMAL ANTIBODY EACH: CPT

## 2022-12-12 PROCEDURE — 84443 ASSAY THYROID STIM HORMONE: CPT

## 2022-12-12 PROCEDURE — 82248 BILIRUBIN DIRECT: CPT

## 2022-12-12 PROCEDURE — 80053 COMPREHEN METABOLIC PANEL: CPT

## 2022-12-13 LAB — THYROPEROXIDASE AB SERPL-ACNC: 55 IU/ML

## 2022-12-15 ENCOUNTER — LAB (OUTPATIENT)
Dept: LAB | Facility: CLINIC | Age: 72
End: 2022-12-15
Payer: MEDICARE

## 2022-12-15 DIAGNOSIS — Z79.899 HIGH RISK MEDICATION USE: ICD-10-CM

## 2022-12-15 DIAGNOSIS — C79.52 SECONDARY MALIGNANT NEOPLASM OF BONE AND BONE MARROW (H): ICD-10-CM

## 2022-12-15 DIAGNOSIS — C76.0 MALIGNANT NEOPLASM OF HEAD, FACE, AND NECK (H): ICD-10-CM

## 2022-12-15 DIAGNOSIS — C79.51 SECONDARY MALIGNANT NEOPLASM OF BONE AND BONE MARROW (H): ICD-10-CM

## 2022-12-15 LAB
ALBUMIN SERPL-MCNC: 2.6 G/DL (ref 3.4–5)
ALP SERPL-CCNC: 110 U/L (ref 40–150)
ALT SERPL W P-5'-P-CCNC: 67 U/L (ref 0–70)
ANION GAP SERPL CALCULATED.3IONS-SCNC: 6 MMOL/L (ref 3–14)
AST SERPL W P-5'-P-CCNC: 27 U/L (ref 0–45)
BILIRUB SERPL-MCNC: 0.3 MG/DL (ref 0.2–1.3)
BUN SERPL-MCNC: 10 MG/DL (ref 7–30)
CALCIUM SERPL-MCNC: 8.5 MG/DL (ref 8.5–10.1)
CHLORIDE BLD-SCNC: 101 MMOL/L (ref 94–109)
CO2 SERPL-SCNC: 26 MMOL/L (ref 20–32)
CREAT SERPL-MCNC: 1.03 MG/DL (ref 0.66–1.25)
GFR SERPL CREATININE-BSD FRML MDRD: 78 ML/MIN/1.73M2
GLUCOSE BLD-MCNC: 93 MG/DL (ref 70–99)
POTASSIUM BLD-SCNC: 4.3 MMOL/L (ref 3.4–5.3)
PROT SERPL-MCNC: 5.7 G/DL (ref 6.8–8.8)
SODIUM SERPL-SCNC: 133 MMOL/L (ref 133–144)

## 2022-12-15 PROCEDURE — 80053 COMPREHEN METABOLIC PANEL: CPT

## 2022-12-15 PROCEDURE — 36415 COLL VENOUS BLD VENIPUNCTURE: CPT

## 2022-12-20 DIAGNOSIS — F41.9 ANXIETY: ICD-10-CM

## 2022-12-20 RX ORDER — CITALOPRAM HYDROBROMIDE 40 MG/1
40 TABLET ORAL DAILY
Qty: 90 TABLET | Refills: 1 | Status: SHIPPED | OUTPATIENT
Start: 2022-12-20 | End: 2023-01-01

## 2022-12-20 NOTE — TELEPHONE ENCOUNTER
Prescription approved per [facility] Refill Protocol.    RUTH Justice    
09-May-2022 21:54

## 2022-12-23 ENCOUNTER — TELEPHONE (OUTPATIENT)
Dept: FAMILY MEDICINE | Facility: CLINIC | Age: 72
End: 2022-12-23

## 2022-12-23 DIAGNOSIS — R07.0 THROAT PAIN: Primary | ICD-10-CM

## 2022-12-23 RX ORDER — NYSTATIN 100000/ML
500000 SUSPENSION, ORAL (FINAL DOSE FORM) ORAL 4 TIMES DAILY
Qty: 473 ML | Refills: 1 | Status: SHIPPED | OUTPATIENT
Start: 2022-12-23 | End: 2023-02-08

## 2022-12-31 ENCOUNTER — HOSPITAL ENCOUNTER (EMERGENCY)
Facility: CLINIC | Age: 72
Discharge: HOME | End: 2022-12-31
Attending: EMERGENCY MEDICINE | Admitting: EMERGENCY MEDICINE
Payer: MEDICARE

## 2022-12-31 VITALS
RESPIRATION RATE: 20 BRPM | HEART RATE: 64 BPM | OXYGEN SATURATION: 96 % | DIASTOLIC BLOOD PRESSURE: 95 MMHG | SYSTOLIC BLOOD PRESSURE: 155 MMHG | TEMPERATURE: 98.3 F

## 2022-12-31 DIAGNOSIS — C79.89: ICD-10-CM

## 2022-12-31 DIAGNOSIS — B37.0 THRUSH, ORAL: ICD-10-CM

## 2022-12-31 LAB
DEPRECATED S PYO AG THROAT QL EIA: NEGATIVE
GROUP A STREP BY PCR: NOT DETECTED

## 2022-12-31 PROCEDURE — 99283 EMERGENCY DEPT VISIT LOW MDM: CPT | Performed by: EMERGENCY MEDICINE

## 2022-12-31 PROCEDURE — 87651 STREP A DNA AMP PROBE: CPT | Performed by: EMERGENCY MEDICINE

## 2022-12-31 PROCEDURE — 99284 EMERGENCY DEPT VISIT MOD MDM: CPT | Performed by: EMERGENCY MEDICINE

## 2022-12-31 RX ORDER — FLUCONAZOLE 100 MG/1
100 TABLET ORAL DAILY
Qty: 15 TABLET | Refills: 0 | Status: SHIPPED | OUTPATIENT
Start: 2022-12-31 | End: 2023-01-15

## 2022-12-31 ASSESSMENT — ENCOUNTER SYMPTOMS
SORE THROAT: 1
TROUBLE SWALLOWING: 1

## 2022-12-31 NOTE — ED TRIAGE NOTES
Pt reports of a sore throat on the left side for the past couple weeks. States he has been receiving chemo at [facility] and they have been adding different things but it hasn't been helping.      Triage Assessment     Row Name 12/31/22 0737       Triage Assessment (Adult)    Airway WDL WDL       Respiratory WDL    Respiratory WDL WDL       Skin Circulation/Temperature WDL    Skin Circulation/Temperature WDL WDL       Cardiac WDL    Cardiac WDL WDL       Peripheral/Neurovascular WDL    Peripheral Neurovascular WDL WDL       Cognitive/Neuro/Behavioral WDL    Cognitive/Neuro/Behavioral WDL WDL

## 2022-12-31 NOTE — ED PROVIDER NOTES
History     Chief Complaint   Patient presents with     Pharyngitis     HPI  Quan Murphy is a 72 year old male who presents with continued sore throat.  He was diagnosed in March 2021 with squamous cell cancer HPV 16 positive left tonsil area.  In addition has had a sclerotic lesion of the seventh left rib.  Fortunately he has been responding to care provider at the [facility].  He sees Dr. Fiona Fulton.  Last encounter was on December 20.  He has completed cycle 4 of 6 of carboplatin.  Remains on Bactrim prophylaxis and prednisone 60 mg daily.  Has been using Carafate and nystatin solutions for comfort and for treatment of oral thrush.  He is requesting a strep test because of continued sore throat complaint.  He is concerned that nystatin is not covering his thrush because he can still see active adherent lesions on the back of the oral pharynx.    Allergies:  Allergies   Allergen Reactions     Animal Dander      Azithromycin Nausea and Vomiting     Dust Mites      Pollen Extract      Smoke.        Problem List:    Patient Active Problem List    Diagnosis Date Noted     Pancytopenia (H) 08/16/2022     Priority: Medium     Severe protein-calorie malnutrition (H) 08/16/2022     Priority: Medium     Chronic, continuous use of opioids 04/21/2022     Priority: Medium     Chronic kidney disease, stage 3 (H) 06/15/2021     Priority: Medium     Failure to thrive (0-17) 06/02/2021     Priority: Medium     Replacing diagnoses that were inactivated after the 10/1/2021 regulatory import.       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     Priority: Medium     Hypomagnesemia 04/13/2021     Priority: Medium     Hiatal hernia 02/17/2020     Priority: Medium     Renal hematoma 10/28/2017     Priority: Medium     Retroperitoneal hematoma 10/28/2017     Priority: Medium     Syncope 10/18/2017     Priority: Medium     S/P ORIF (open reduction internal fixation) fracture 08/03/2017     Priority: Medium      Closed Colles' fracture of right radius with routine healing, subsequent encounter 08/03/2017     Priority: Medium     Status post insertion of drug-eluting stent into left anterior descending artery for coronary artery disease 01/05/2017     Priority: Medium     Chest pain 12/24/2016     Priority: Medium     Arthropathy 02/04/2014     Priority: Medium     Problem list name updated by automated process. Provider to review       Coronary atherosclerosis      Priority: Medium     Coronary artery disease  Problem list name updated by automated process. 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 Provider to review       Acute reaction to stress 01/12/2005     Priority: Medium     Problem list name updated by automated process. Provider to review       Chronic maxillary sinusitis 01/12/2005     Priority: Medium     Contracture of palmar fascia 01/12/2005     Priority: Medium     Benign neoplasm of skin of other and unspecified parts of face 01/12/2005     Priority: Medium     Malignant neoplasm of prostate (H) 11/26/2004     Priority: Medium        Past Medical History:    Past Medical History:   Diagnosis Date     Allergic rhinitis      Allergy, unspecified not elsewhere classified      Antiplatelet or antithrombotic long-term use      Anxiety      Arthritis      Chest pain      Chronic sinusitis      Coronary atherosclerosis of unspecified type of vessel, native or graft       Depressive disorder 1995     Gastroesophageal reflux disease 2020     Head injury 1954     Hiatal hernia 2015     History of blood transfusion 12/15/2004     Hyperlipidemia      Hypertension      Inguinal hernia      Kidney problem 10/08/2017     Kidney stones      Malignant neoplasm of prostate (H)      Prostate cancer (H)      Syncope      Tonsil cancer (H)        Past Surgical History:    Past Surgical History:   Procedure Laterality Date     ARTHRODESIS FOOT  7/23/2013    Procedure: ARTHRODESIS FOOT;  Great Toe Arthrodesis Left Foot;  Surgeon: Ash Gonzalez DPM;  Location: [facility] OR     ARTHRODESIS FOOT  6/10/2014    Procedure: ARTHRODESIS FOOT;  Surgeon: Ash Gonzalez DPM;  Location: [facility]     BIOPSY  10/1/2004    dermatologist biopsies     COLONOSCOPY  10/7/2013    Procedure: COLONOSCOPY;  Colonoscopy;  Surgeon: Mike Fallon MD;  Location: [facility]     CYSTOSCOPY N/A 2/16/2022    Procedure: CYSTOSCOPY and bladder stone removal;  Surgeon: David Rogers MD;  Location: [facility] OR     ESOPHAGOSCOPY, GASTROSCOPY, DUODENOSCOPY (EGD), COMBINED N/A 2/12/2020    Procedure: ESOPHAGOGASTRODUODENOSCOPY (EGD);  Surgeon: Sam Escobar MD;  Location: [facility]     EXTRACORPOREAL SHOCK WAVE LITHOTRIPSY (ESWL) Bilateral 10/18/2017    Procedure: EXTRACORPOREAL SHOCK WAVE LITHOTRIPSY (ESWL);  BILATERAL EXTRACORPOREAL SHOCKWAVE LITHOTRIPSY ;  Surgeon: Meir Torres MD;  Location: [facility] OR     HC CORRECT BUNION,SIMPLE  08/11/2005    x3     HC REMV TOE BENIGN BONE LESN  08/11/2005     HEAD & NECK SURGERY  1954    From a fall     HERNIORRHAPHY INGUINAL  7/3/2013    Procedure: HERNIORRHAPHY INGUINAL;  Open Repair Inguinal hernia Right with mesh ;  Surgeon: Sam Escobar MD;  Location: [facility] OR     IR GASTROSTOMY TUBE PERCUTANEOUS PLCMNT  6/7/2021     MOHS MICROGRAPHIC PROCEDURE  08/23/11    ear and chin-[facility]     OPEN REDUCTION INTERNAL FIXATION WRIST Right 7/18/2017    Procedure: OPEN REDUCTION  INTERNAL FIXATION WRIST;  Right distal radius open reduction and internal fixation;  Surgeon: Pedro Blanca DO;  Location: [facility]     RECONSTRUCT FOREFOOT WITH METATARSOPHALANGEAL (MTP) FUSION  6/10/2014    Procedure: RECONSTRUCT FOREFOOT WITH METATARSOPHALANGEAL (MTP) FUSION;  Surgeon: Ash Gonzalez DPM;  Location: [facility]     STENT, CORONARY, DEMI       SURGICAL HISTORY OF -   1999/1974    lt knee     SURGICAL HISTORY OF -   10/2004    lithotripsy     SURGICAL HISTORY OF -   11/05    angiogram with stent     VASCULAR SURGERY  11/17/2005    Puncture of iliac artery during and angiogram     [facility] LAPAROSCOPY, SURGICAL PROSTATECTOMY, RETROPUBIC RADICAL, W/NERVE SPARING  11/30/2004    With full bilateral pelvic lymphadenectomy.  [facility].     [facility] TOTAL KNEE ARTHROPLASTY  05/01/08    Left knee       Family History:    Family History   Problem Relation Age of Onset     Hypertension Father         Lived to age 87     Connective Tissue Disorder Mother         LUPUS     Heart Disease Mother         Valve replacement     Anxiety Disorder Mother         Lived to age 84     Dementia Mother         Nursing Home (lived to age 86)       Social History:  Marital Status:   [2]  Social History     Tobacco Use     Smoking status: Never     Smokeless tobacco: Never   Vaping Use     Vaping Use: Never used   Substance Use Topics     Alcohol use: Yes     Alcohol/week: 10.0 standard drinks     Types: 10 Cans of beer per week     Comment: 1 hard  liquid and 1 wine or beer per day     Drug use: No        Medications:    oxyCODONE (ROXICODONE) 5 MG tablet  ALPRAZolam (XANAX) 0.5 MG tablet  buPROPion (WELLBUTRIN XL) 150 MG 24 hr tablet  cetirizine (ZYRTEC) 10 MG tablet  cevimeline (EVOXAC) 30 MG capsule  citalopram (CELEXA) 40 MG tablet  dronabinol (MARINOL) 2.5 MG capsule  HYDROcodone-acetaminophen (NORCO)  MG per tablet  levothyroxine (SYNTHROID/LEVOTHROID) 112 MCG tablet  levothyroxine (SYNTHROID/LEVOTHROID) 75 MCG  tablet  metoprolol succinate ER (TOPROL XL) 50 MG 24 hr tablet  nitroGLYcerin (NITROSTAT) 0.4 MG sublingual tablet  nystatin (MYCOSTATIN) 465244 UNIT/ML suspension  omeprazole (PRILOSEC) 40 MG DR capsule  ondansetron (ZOFRAN ODT) 4 MG ODT tab  pilocarpine (SALAGEN) 5 MG tablet  rosuvastatin (CRESTOR) 40 MG tablet  zolpidem ER (AMBIEN CR) 12.5 MG CR tablet          Review of Systems   HENT: Positive for sore throat and trouble swallowing.    All other systems reviewed and are negative.      Physical Exam   BP: (!) 155/95  Pulse: 64  Temp: 98.3  F (36.8  C)  Resp: 20  SpO2: 96 %      Physical Exam  Vitals and nursing note reviewed.   Constitutional:       Appearance: He is not ill-appearing.   HENT:      Head: Normocephalic.      Nose: Nose normal.      Mouth/Throat:      Comments: Erythema in the left palatine tonsil area.  No exudate.  There is adherence for thrush on the oral mucosa, oropharynx and posterior pharynx.  No palpable adenopathy.  Eyes:      Conjunctiva/sclera: Conjunctivae normal.   Cardiovascular:      Rate and Rhythm: Normal rate.   Pulmonary:      Effort: Pulmonary effort is normal.   Skin:     General: Skin is warm.      Capillary Refill: Capillary refill takes less than 2 seconds.      Findings: No rash.   Neurological:      General: No focal deficit present.      Mental Status: He is alert and oriented to person, place, and time.   Psychiatric:         Mood and Affect: Mood normal.         Behavior: Behavior normal.         ED Course                 Procedures                  Results for orders placed or performed during the hospital encounter of 12/31/22 (from the past 24 hour(s))   Streptococcus A Rapid Scr w Reflx to PCR    Specimen: Throat; Swab   Result Value Ref Range    Group A Strep antigen Negative Negative     *Note: Due to a large number of results and/or encounters for the requested time period, some results have not been displayed. A complete set of results can be found in Results  Review.       Medications - No data to display    Assessments & Plan (with Medical Decision Making)  History for squamous cell throat cancer HPV 16 positive.  On first-line therapy at the [facility] under the care of Dr. Fiona Fulton.  Responding favorably to carboplatin/paclitaxel/pembrolizumab therapy.  He completed cycle 4 of 6.  He came today because of continued sore throat with concerns of unresolved thrush.  He has maintained use of Carafate and oral nystatin solutions.  He still on his Bactrim prophylaxis and is on prednisone 60 mg daily.  Today we noted that the back of his throat does show some swelling and erythema.  Nothing compromising airway.  Does have adherent plaque of thrush on the oral cavity oropharynx and posterior pharynx.  Plan: Add Diflucan to see if this helps resolve his thrush that has not been responsive to his nystatin.     I have reviewed the nursing notes.    I have reviewed the findings, diagnosis, plan and need for follow up with the patient.          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

## 2022-12-31 NOTE — DISCHARGE INSTRUCTIONS
- Enjoyed seeing you today.  Very thankful to see that you are responding to therapy administered the [facility].  Test results have shown excellent response and noted per the notes from Dr. China Fulton that she is also very pleased.    -You were correct and that you still have an resolved oral thrush despite appropriate treatment with your nystatin.  I recommend adding Diflucan 100 mg dose daily for 15 days.  This is usually very effective in treating thrush.    -Continue with her current medications.  You can remain on nystatin and Carafate.  If the nystatin is causing her to gag you could temporarily stop it while on your Diflucan therapy but would recommend restarting it as soon as Diflucan treatment has been completed.

## 2023-01-01 ENCOUNTER — APPOINTMENT (OUTPATIENT)
Dept: GENERAL RADIOLOGY | Facility: CLINIC | Age: 73
DRG: 640 | End: 2023-01-01
Attending: PEDIATRICS
Payer: MEDICARE

## 2023-01-01 ENCOUNTER — ANESTHESIA (OUTPATIENT)
Dept: SURGERY | Facility: CLINIC | Age: 73
DRG: 417 | End: 2023-01-01
Payer: MEDICARE

## 2023-01-01 ENCOUNTER — ANESTHESIA EVENT (OUTPATIENT)
Dept: SURGERY | Facility: CLINIC | Age: 73
DRG: 417 | End: 2023-01-01
Payer: MEDICARE

## 2023-01-01 ENCOUNTER — MYC REFILL (OUTPATIENT)
Dept: FAMILY MEDICINE | Facility: CLINIC | Age: 73
End: 2023-01-01

## 2023-01-01 ENCOUNTER — LAB (OUTPATIENT)
Dept: LAB | Facility: CLINIC | Age: 73
End: 2023-01-01
Payer: MEDICARE

## 2023-01-01 ENCOUNTER — MYC MEDICAL ADVICE (OUTPATIENT)
Dept: FAMILY MEDICINE | Facility: CLINIC | Age: 73
End: 2023-01-01
Payer: MEDICARE

## 2023-01-01 ENCOUNTER — HEALTH MAINTENANCE LETTER (OUTPATIENT)
Age: 73
End: 2023-01-01

## 2023-01-01 ENCOUNTER — TELEPHONE (OUTPATIENT)
Dept: FAMILY MEDICINE | Facility: CLINIC | Age: 73
End: 2023-01-01
Payer: MEDICARE

## 2023-01-01 ENCOUNTER — APPOINTMENT (OUTPATIENT)
Dept: GENERAL RADIOLOGY | Facility: CLINIC | Age: 73
DRG: 417 | End: 2023-01-01
Attending: HOSPITALIST
Payer: MEDICARE

## 2023-01-01 ENCOUNTER — OFFICE VISIT (OUTPATIENT)
Dept: FAMILY MEDICINE | Facility: CLINIC | Age: 73
End: 2023-01-01
Payer: MEDICARE

## 2023-01-01 ENCOUNTER — TELEPHONE (OUTPATIENT)
Dept: SURGERY | Facility: CLINIC | Age: 73
End: 2023-01-01

## 2023-01-01 ENCOUNTER — APPOINTMENT (OUTPATIENT)
Dept: CT IMAGING | Facility: CLINIC | Age: 73
DRG: 640 | End: 2023-01-01
Attending: FAMILY MEDICINE
Payer: MEDICARE

## 2023-01-01 ENCOUNTER — PATIENT OUTREACH (OUTPATIENT)
Dept: CARE COORDINATION | Facility: CLINIC | Age: 73
End: 2023-01-01
Payer: MEDICARE

## 2023-01-01 ENCOUNTER — APPOINTMENT (OUTPATIENT)
Dept: PHYSICAL THERAPY | Facility: CLINIC | Age: 73
DRG: 417 | End: 2023-01-01
Attending: PHYSICIAN ASSISTANT
Payer: MEDICARE

## 2023-01-01 ENCOUNTER — HOSPITAL ENCOUNTER (INPATIENT)
Facility: CLINIC | Age: 73
LOS: 3 days | Discharge: HOME | DRG: 444 | End: 2023-10-10
Attending: HOSPITALIST | Admitting: INTERNAL MEDICINE
Payer: MEDICARE

## 2023-01-01 ENCOUNTER — TELEPHONE (OUTPATIENT)
Dept: EMERGENCY MEDICINE | Facility: CLINIC | Age: 73
End: 2023-01-01
Payer: MEDICARE

## 2023-01-01 ENCOUNTER — APPOINTMENT (OUTPATIENT)
Dept: OCCUPATIONAL THERAPY | Facility: CLINIC | Age: 73
DRG: 444 | End: 2023-01-01
Attending: INTERNAL MEDICINE
Payer: MEDICARE

## 2023-01-01 ENCOUNTER — MYC MEDICAL ADVICE (OUTPATIENT)
Dept: FAMILY MEDICINE | Facility: CLINIC | Age: 73
End: 2023-01-01

## 2023-01-01 ENCOUNTER — APPOINTMENT (OUTPATIENT)
Dept: OCCUPATIONAL THERAPY | Facility: CLINIC | Age: 73
DRG: 417 | End: 2023-01-01
Attending: HOSPITALIST
Payer: MEDICARE

## 2023-01-01 ENCOUNTER — APPOINTMENT (OUTPATIENT)
Dept: SURGERY | Facility: PHYSICIAN GROUP | Age: 73
End: 2023-01-01
Payer: MEDICARE

## 2023-01-01 ENCOUNTER — APPOINTMENT (OUTPATIENT)
Dept: OCCUPATIONAL THERAPY | Facility: CLINIC | Age: 73
DRG: 444 | End: 2023-01-01
Attending: HOSPITALIST
Payer: MEDICARE

## 2023-01-01 ENCOUNTER — PATIENT OUTREACH (OUTPATIENT)
Dept: CARE COORDINATION | Facility: CLINIC | Age: 73
End: 2023-01-01

## 2023-01-01 ENCOUNTER — TELEPHONE (OUTPATIENT)
Dept: FAMILY MEDICINE | Facility: CLINIC | Age: 73
End: 2023-01-01

## 2023-01-01 ENCOUNTER — APPOINTMENT (OUTPATIENT)
Dept: CARDIOLOGY | Facility: CLINIC | Age: 73
DRG: 444 | End: 2023-01-01
Attending: HOSPITALIST
Payer: MEDICARE

## 2023-01-01 ENCOUNTER — APPOINTMENT (OUTPATIENT)
Dept: PHYSICAL THERAPY | Facility: CLINIC | Age: 73
DRG: 444 | End: 2023-01-01
Attending: INTERNAL MEDICINE
Payer: MEDICARE

## 2023-01-01 ENCOUNTER — MEDICAL CORRESPONDENCE (OUTPATIENT)
Dept: HEALTH INFORMATION MANAGEMENT | Facility: CLINIC | Age: 73
End: 2023-01-01

## 2023-01-01 ENCOUNTER — APPOINTMENT (OUTPATIENT)
Dept: SPEECH THERAPY | Facility: CLINIC | Age: 73
DRG: 640 | End: 2023-01-01
Attending: FAMILY MEDICINE
Payer: MEDICARE

## 2023-01-01 ENCOUNTER — MYC REFILL (OUTPATIENT)
Dept: FAMILY MEDICINE | Facility: CLINIC | Age: 73
End: 2023-01-01
Payer: MEDICARE

## 2023-01-01 ENCOUNTER — HOSPITAL ENCOUNTER (EMERGENCY)
Facility: CLINIC | Age: 73
Discharge: ANOTHER HOSPITAL | DRG: 417 | End: 2023-10-15
Attending: FAMILY MEDICINE | Admitting: FAMILY MEDICINE
Payer: MEDICARE

## 2023-01-01 ENCOUNTER — INFUSION THERAPY VISIT (OUTPATIENT)
Dept: INFUSION THERAPY | Facility: CLINIC | Age: 73
End: 2023-01-01
Attending: STUDENT IN AN ORGANIZED HEALTH CARE EDUCATION/TRAINING PROGRAM
Payer: MEDICARE

## 2023-01-01 ENCOUNTER — APPOINTMENT (OUTPATIENT)
Dept: MRI IMAGING | Facility: CLINIC | Age: 73
DRG: 444 | End: 2023-01-01
Attending: STUDENT IN AN ORGANIZED HEALTH CARE EDUCATION/TRAINING PROGRAM
Payer: MEDICARE

## 2023-01-01 ENCOUNTER — OFFICE VISIT (OUTPATIENT)
Dept: UROLOGY | Facility: CLINIC | Age: 73
End: 2023-01-01
Payer: MEDICARE

## 2023-01-01 ENCOUNTER — APPOINTMENT (OUTPATIENT)
Dept: PHYSICAL THERAPY | Facility: CLINIC | Age: 73
DRG: 444 | End: 2023-01-01
Attending: HOSPITALIST
Payer: MEDICARE

## 2023-01-01 ENCOUNTER — APPOINTMENT (OUTPATIENT)
Dept: GENERAL RADIOLOGY | Facility: CLINIC | Age: 73
DRG: 640 | End: 2023-01-01
Attending: NURSE PRACTITIONER
Payer: MEDICARE

## 2023-01-01 ENCOUNTER — MEDICAL CORRESPONDENCE (OUTPATIENT)
Dept: SCHEDULING | Facility: CLINIC | Age: 73
End: 2023-01-01
Payer: MEDICARE

## 2023-01-01 ENCOUNTER — APPOINTMENT (OUTPATIENT)
Dept: CT IMAGING | Facility: CLINIC | Age: 73
End: 2023-01-01
Attending: STUDENT IN AN ORGANIZED HEALTH CARE EDUCATION/TRAINING PROGRAM
Payer: MEDICARE

## 2023-01-01 ENCOUNTER — APPOINTMENT (OUTPATIENT)
Dept: SPEECH THERAPY | Facility: CLINIC | Age: 73
DRG: 640 | End: 2023-01-01
Payer: MEDICARE

## 2023-01-01 ENCOUNTER — HOSPITAL ENCOUNTER (INPATIENT)
Facility: CLINIC | Age: 73
LOS: 4 days | Discharge: HOME | DRG: 417 | End: 2023-10-19
Attending: HOSPITALIST | Admitting: HOSPITALIST
Payer: MEDICARE

## 2023-01-01 ENCOUNTER — TRANSFERRED RECORDS (OUTPATIENT)
Dept: HEALTH INFORMATION MANAGEMENT | Facility: CLINIC | Age: 73
End: 2023-01-01

## 2023-01-01 ENCOUNTER — APPOINTMENT (OUTPATIENT)
Dept: ULTRASOUND IMAGING | Facility: CLINIC | Age: 73
DRG: 444 | End: 2023-01-01
Attending: FAMILY MEDICINE
Payer: MEDICARE

## 2023-01-01 ENCOUNTER — HOSPITAL ENCOUNTER (INPATIENT)
Facility: CLINIC | Age: 73
LOS: 7 days | Discharge: HOME | DRG: 640 | End: 2023-11-04
Attending: NURSE PRACTITIONER | Admitting: HOSPITALIST
Payer: MEDICARE

## 2023-01-01 ENCOUNTER — HOSPITAL ENCOUNTER (EMERGENCY)
Facility: CLINIC | Age: 73
Discharge: LEFT BEFORE DISCHARGE | End: 2023-09-27
Attending: STUDENT IN AN ORGANIZED HEALTH CARE EDUCATION/TRAINING PROGRAM | Admitting: STUDENT IN AN ORGANIZED HEALTH CARE EDUCATION/TRAINING PROGRAM
Payer: MEDICARE

## 2023-01-01 ENCOUNTER — APPOINTMENT (OUTPATIENT)
Dept: ULTRASOUND IMAGING | Facility: CLINIC | Age: 73
End: 2023-01-01
Attending: STUDENT IN AN ORGANIZED HEALTH CARE EDUCATION/TRAINING PROGRAM
Payer: MEDICARE

## 2023-01-01 ENCOUNTER — APPOINTMENT (OUTPATIENT)
Dept: NUCLEAR MEDICINE | Facility: CLINIC | Age: 73
DRG: 444 | End: 2023-01-01
Attending: SURGERY
Payer: MEDICARE

## 2023-01-01 ENCOUNTER — APPOINTMENT (OUTPATIENT)
Dept: CT IMAGING | Facility: CLINIC | Age: 73
DRG: 417 | End: 2023-01-01
Attending: FAMILY MEDICINE
Payer: MEDICARE

## 2023-01-01 ENCOUNTER — HOSPITAL ENCOUNTER (EMERGENCY)
Facility: CLINIC | Age: 73
Discharge: ANOTHER HOSPITAL | DRG: 444 | End: 2023-10-07
Attending: FAMILY MEDICINE | Admitting: FAMILY MEDICINE
Payer: MEDICARE

## 2023-01-01 ENCOUNTER — APPOINTMENT (OUTPATIENT)
Dept: ULTRASOUND IMAGING | Facility: CLINIC | Age: 73
DRG: 417 | End: 2023-01-01
Attending: FAMILY MEDICINE
Payer: MEDICARE

## 2023-01-01 VITALS
SYSTOLIC BLOOD PRESSURE: 116 MMHG | BODY MASS INDEX: 27.22 KG/M2 | HEART RATE: 73 BPM | RESPIRATION RATE: 17 BRPM | WEIGHT: 179 LBS | OXYGEN SATURATION: 99 % | DIASTOLIC BLOOD PRESSURE: 71 MMHG | TEMPERATURE: 97.9 F

## 2023-01-01 VITALS
SYSTOLIC BLOOD PRESSURE: 102 MMHG | DIASTOLIC BLOOD PRESSURE: 68 MMHG | WEIGHT: 202 LBS | BODY MASS INDEX: 31.71 KG/M2 | TEMPERATURE: 97.8 F | HEIGHT: 67 IN

## 2023-01-01 VITALS
BODY MASS INDEX: 27.74 KG/M2 | HEART RATE: 68 BPM | WEIGHT: 183 LBS | DIASTOLIC BLOOD PRESSURE: 70 MMHG | RESPIRATION RATE: 18 BRPM | SYSTOLIC BLOOD PRESSURE: 120 MMHG | HEIGHT: 68 IN | TEMPERATURE: 98.1 F | OXYGEN SATURATION: 96 %

## 2023-01-01 VITALS
HEART RATE: 88 BPM | WEIGHT: 175 LBS | HEIGHT: 68 IN | TEMPERATURE: 97.1 F | DIASTOLIC BLOOD PRESSURE: 64 MMHG | SYSTOLIC BLOOD PRESSURE: 112 MMHG | BODY MASS INDEX: 26.52 KG/M2 | RESPIRATION RATE: 16 BRPM | OXYGEN SATURATION: 99 %

## 2023-01-01 VITALS
BODY MASS INDEX: 25.48 KG/M2 | OXYGEN SATURATION: 96 % | TEMPERATURE: 98.7 F | WEIGHT: 168.1 LBS | HEART RATE: 84 BPM | HEIGHT: 68 IN | DIASTOLIC BLOOD PRESSURE: 76 MMHG | RESPIRATION RATE: 17 BRPM | SYSTOLIC BLOOD PRESSURE: 121 MMHG

## 2023-01-01 VITALS
TEMPERATURE: 98.8 F | RESPIRATION RATE: 16 BRPM | HEART RATE: 78 BPM | OXYGEN SATURATION: 98 % | DIASTOLIC BLOOD PRESSURE: 67 MMHG | SYSTOLIC BLOOD PRESSURE: 123 MMHG

## 2023-01-01 VITALS
OXYGEN SATURATION: 97 % | RESPIRATION RATE: 22 BRPM | DIASTOLIC BLOOD PRESSURE: 82 MMHG | TEMPERATURE: 98.3 F | HEART RATE: 80 BPM | SYSTOLIC BLOOD PRESSURE: 132 MMHG

## 2023-01-01 VITALS
WEIGHT: 178.38 LBS | SYSTOLIC BLOOD PRESSURE: 122 MMHG | HEART RATE: 100 BPM | OXYGEN SATURATION: 100 % | TEMPERATURE: 97.6 F | BODY MASS INDEX: 28 KG/M2 | HEIGHT: 67 IN | DIASTOLIC BLOOD PRESSURE: 64 MMHG | RESPIRATION RATE: 20 BRPM

## 2023-01-01 VITALS
WEIGHT: 200.5 LBS | SYSTOLIC BLOOD PRESSURE: 86 MMHG | DIASTOLIC BLOOD PRESSURE: 60 MMHG | RESPIRATION RATE: 14 BRPM | TEMPERATURE: 97.2 F | BODY MASS INDEX: 31.4 KG/M2 | OXYGEN SATURATION: 95 % | HEART RATE: 70 BPM

## 2023-01-01 VITALS
WEIGHT: 181 LBS | BODY MASS INDEX: 27.43 KG/M2 | SYSTOLIC BLOOD PRESSURE: 91 MMHG | HEART RATE: 61 BPM | OXYGEN SATURATION: 96 % | DIASTOLIC BLOOD PRESSURE: 50 MMHG | RESPIRATION RATE: 20 BRPM | TEMPERATURE: 97.6 F | HEIGHT: 68 IN

## 2023-01-01 VITALS
DIASTOLIC BLOOD PRESSURE: 82 MMHG | OXYGEN SATURATION: 99 % | BODY MASS INDEX: 28.8 KG/M2 | SYSTOLIC BLOOD PRESSURE: 148 MMHG | HEART RATE: 61 BPM | WEIGHT: 189.4 LBS | RESPIRATION RATE: 16 BRPM | TEMPERATURE: 97.5 F

## 2023-01-01 DIAGNOSIS — G47.01 SLEEP DISORDER DUE TO A GENERAL MEDICAL CONDITION, INSOMNIA TYPE: ICD-10-CM

## 2023-01-01 DIAGNOSIS — K11.7 XEROSTOMIA: Primary | ICD-10-CM

## 2023-01-01 DIAGNOSIS — R11.2 INTRACTABLE NAUSEA AND VOMITING: ICD-10-CM

## 2023-01-01 DIAGNOSIS — C09.9 SQUAMOUS CELL CARCINOMA OF LEFT TONSIL (H): ICD-10-CM

## 2023-01-01 DIAGNOSIS — R11.2 NAUSEA AND VOMITING, UNSPECIFIED VOMITING TYPE: ICD-10-CM

## 2023-01-01 DIAGNOSIS — C76.0 MALIGNANT NEOPLASM OF HEAD, FACE, AND NECK (H): ICD-10-CM

## 2023-01-01 DIAGNOSIS — E87.6 HYPOKALEMIA: ICD-10-CM

## 2023-01-01 DIAGNOSIS — Z87.81 S/P ORIF (OPEN REDUCTION INTERNAL FIXATION) FRACTURE: ICD-10-CM

## 2023-01-01 DIAGNOSIS — E78.5 HYPERLIPIDEMIA LDL GOAL <130: ICD-10-CM

## 2023-01-01 DIAGNOSIS — E63.9 INADEQUATE ORAL NUTRITIONAL INTAKE: ICD-10-CM

## 2023-01-01 DIAGNOSIS — E87.6 HYPOKALEMIA: Primary | ICD-10-CM

## 2023-01-01 DIAGNOSIS — C61 MALIGNANT NEOPLASM OF PROSTATE (H): ICD-10-CM

## 2023-01-01 DIAGNOSIS — M19.071 OSTEOARTHRITIS OF RIGHT FOOT, UNSPECIFIED OSTEOARTHRITIS TYPE: ICD-10-CM

## 2023-01-01 DIAGNOSIS — G89.18 POSTOPERATIVE PAIN: ICD-10-CM

## 2023-01-01 DIAGNOSIS — C78.7 METASTATIC CANCER TO LIVER (H): ICD-10-CM

## 2023-01-01 DIAGNOSIS — Z98.890 S/P ORIF (OPEN REDUCTION INTERNAL FIXATION) FRACTURE: ICD-10-CM

## 2023-01-01 DIAGNOSIS — Z95.5 STATUS POST INSERTION OF DRUG-ELUTING STENT INTO LEFT ANTERIOR DESCENDING ARTERY FOR CORONARY ARTERY DISEASE: ICD-10-CM

## 2023-01-01 DIAGNOSIS — R13.12 OROPHARYNGEAL DYSPHAGIA: ICD-10-CM

## 2023-01-01 DIAGNOSIS — C09.9 SQUAMOUS CELL CARCINOMA OF LEFT TONSIL (H): Primary | ICD-10-CM

## 2023-01-01 DIAGNOSIS — Z79.2 ENCOUNTER FOR LONG-TERM (CURRENT) USE OF ANTIBIOTICS: ICD-10-CM

## 2023-01-01 DIAGNOSIS — E83.39 HYPOPHOSPHATEMIA: ICD-10-CM

## 2023-01-01 DIAGNOSIS — N18.30 STAGE 3 CHRONIC KIDNEY DISEASE, UNSPECIFIED WHETHER STAGE 3A OR 3B CKD (H): ICD-10-CM

## 2023-01-01 DIAGNOSIS — R10.11 RUQ ABDOMINAL PAIN: Primary | ICD-10-CM

## 2023-01-01 DIAGNOSIS — C79.51 MALIGNANT NEOPLASM METASTATIC TO BONE (H): ICD-10-CM

## 2023-01-01 DIAGNOSIS — Z79.2 ANTIBIOTIC LONG-TERM USE: Primary | ICD-10-CM

## 2023-01-01 DIAGNOSIS — J32.0 CHRONIC MAXILLARY SINUSITIS: Primary | ICD-10-CM

## 2023-01-01 DIAGNOSIS — M62.81 MUSCLE WEAKNESS (GENERALIZED): ICD-10-CM

## 2023-01-01 DIAGNOSIS — G89.28 OTHER CHRONIC POSTPROCEDURAL PAIN: ICD-10-CM

## 2023-01-01 DIAGNOSIS — F43.0 ACUTE REACTION TO STRESS: ICD-10-CM

## 2023-01-01 DIAGNOSIS — R55 SYNCOPE: ICD-10-CM

## 2023-01-01 DIAGNOSIS — I25.10 ATHEROSCLEROSIS OF NATIVE CORONARY ARTERY OF NATIVE HEART WITHOUT ANGINA PECTORIS: ICD-10-CM

## 2023-01-01 DIAGNOSIS — K90.89 BILE SALT-INDUCED DIARRHEA: ICD-10-CM

## 2023-01-01 DIAGNOSIS — E03.4 HYPOTHYROIDISM DUE TO ACQUIRED ATROPHY OF THYROID: ICD-10-CM

## 2023-01-01 DIAGNOSIS — F41.9 ANXIETY: ICD-10-CM

## 2023-01-01 DIAGNOSIS — Z09 HOSPITAL DISCHARGE FOLLOW-UP: Primary | ICD-10-CM

## 2023-01-01 DIAGNOSIS — R11.0 NAUSEA: ICD-10-CM

## 2023-01-01 DIAGNOSIS — R74.8 ELEVATED ALKALINE PHOSPHATASE LEVEL: ICD-10-CM

## 2023-01-01 DIAGNOSIS — R74.8 ELEVATED LIPASE: ICD-10-CM

## 2023-01-01 DIAGNOSIS — Z79.2 ENCOUNTER FOR LONG-TERM (CURRENT) USE OF ANTIBIOTICS: Primary | ICD-10-CM

## 2023-01-01 DIAGNOSIS — E83.42 HYPOMAGNESEMIA: ICD-10-CM

## 2023-01-01 DIAGNOSIS — K81.1 CHRONIC CHOLECYSTITIS: ICD-10-CM

## 2023-01-01 DIAGNOSIS — K80.20 SYMPTOMATIC CHOLELITHIASIS: ICD-10-CM

## 2023-01-01 DIAGNOSIS — Z87.442 HISTORY OF RENAL STONE: ICD-10-CM

## 2023-01-01 DIAGNOSIS — K82.8 THICKENING OF WALL OF GALLBLADDER: ICD-10-CM

## 2023-01-01 DIAGNOSIS — F11.20 UNCOMPLICATED OPIOID DEPENDENCE (H): ICD-10-CM

## 2023-01-01 DIAGNOSIS — T50.905A DRUG-INDUCED HEPATITIS: ICD-10-CM

## 2023-01-01 DIAGNOSIS — K80.50 CHOLEDOCHOLITHIASIS: ICD-10-CM

## 2023-01-01 DIAGNOSIS — J32.0 CHRONIC MAXILLARY SINUSITIS: ICD-10-CM

## 2023-01-01 DIAGNOSIS — R10.10 UPPER ABDOMINAL PAIN: ICD-10-CM

## 2023-01-01 DIAGNOSIS — K76.9 LIVER LESION: ICD-10-CM

## 2023-01-01 DIAGNOSIS — R62.51 FAILURE TO THRIVE IN PEDIATRIC PATIENT: ICD-10-CM

## 2023-01-01 DIAGNOSIS — K81.0 ACUTE CHOLECYSTITIS: ICD-10-CM

## 2023-01-01 DIAGNOSIS — Z90.49 S/P CHOLECYSTECTOMY: ICD-10-CM

## 2023-01-01 DIAGNOSIS — K85.10 GALLSTONE PANCREATITIS: ICD-10-CM

## 2023-01-01 DIAGNOSIS — R78.81 BACTEREMIA: ICD-10-CM

## 2023-01-01 DIAGNOSIS — I10 HYPERTENSION GOAL BP (BLOOD PRESSURE) < 140/90: ICD-10-CM

## 2023-01-01 DIAGNOSIS — R07.9 CHEST PAIN: ICD-10-CM

## 2023-01-01 DIAGNOSIS — F11.90 CHRONIC, CONTINUOUS USE OF OPIOIDS: ICD-10-CM

## 2023-01-01 DIAGNOSIS — I10 BENIGN ESSENTIAL HYPERTENSION: ICD-10-CM

## 2023-01-01 DIAGNOSIS — K85.10 GALLSTONE PANCREATITIS: Primary | ICD-10-CM

## 2023-01-01 DIAGNOSIS — Z87.898 HISTORY OF BACTEREMIA: ICD-10-CM

## 2023-01-01 DIAGNOSIS — E43 SEVERE PROTEIN-CALORIE MALNUTRITION (H): ICD-10-CM

## 2023-01-01 DIAGNOSIS — E87.20 METABOLIC ACIDOSIS, NORMAL ANION GAP (NAG): ICD-10-CM

## 2023-01-01 DIAGNOSIS — K76.89 HEPATIC CYST: Primary | ICD-10-CM

## 2023-01-01 DIAGNOSIS — Z20.822 LAB TEST NEGATIVE FOR COVID-19 VIRUS: ICD-10-CM

## 2023-01-01 DIAGNOSIS — K21.00 GASTROESOPHAGEAL REFLUX DISEASE WITH ESOPHAGITIS: ICD-10-CM

## 2023-01-01 DIAGNOSIS — Z79.2 ANTIBIOTIC LONG-TERM USE: ICD-10-CM

## 2023-01-01 DIAGNOSIS — R19.7 DIARRHEA, UNSPECIFIED TYPE: ICD-10-CM

## 2023-01-01 DIAGNOSIS — K71.6 DRUG-INDUCED HEPATITIS: ICD-10-CM

## 2023-01-01 DIAGNOSIS — K21.00 GASTROESOPHAGEAL REFLUX DISEASE WITH ESOPHAGITIS, UNSPECIFIED WHETHER HEMORRHAGE: Primary | ICD-10-CM

## 2023-01-01 DIAGNOSIS — R53.1 WEAKNESS: ICD-10-CM

## 2023-01-01 DIAGNOSIS — R63.0 DECREASED APPETITE: ICD-10-CM

## 2023-01-01 DIAGNOSIS — M12.9 ARTHROPATHY: ICD-10-CM

## 2023-01-01 DIAGNOSIS — K85.10 ACUTE BILIARY PANCREATITIS, UNSPECIFIED COMPLICATION STATUS: ICD-10-CM

## 2023-01-01 DIAGNOSIS — K11.7 XEROSTOMIA: ICD-10-CM

## 2023-01-01 DIAGNOSIS — N21.0 BLADDER STONES: Primary | ICD-10-CM

## 2023-01-01 LAB
ABO/RH(D): NORMAL
ALBUMIN SERPL BCG-MCNC: 2.4 G/DL (ref 3.5–5.2)
ALBUMIN SERPL BCG-MCNC: 2.6 G/DL (ref 3.5–5.2)
ALBUMIN SERPL BCG-MCNC: 2.6 G/DL (ref 3.5–5.2)
ALBUMIN SERPL BCG-MCNC: 2.7 G/DL (ref 3.5–5.2)
ALBUMIN SERPL BCG-MCNC: 2.8 G/DL (ref 3.5–5.2)
ALBUMIN SERPL BCG-MCNC: 2.8 G/DL (ref 3.5–5.2)
ALBUMIN SERPL BCG-MCNC: 2.9 G/DL (ref 3.5–5.2)
ALBUMIN SERPL BCG-MCNC: 2.9 G/DL (ref 3.5–5.2)
ALBUMIN SERPL BCG-MCNC: 3.1 G/DL (ref 3.5–5.2)
ALBUMIN SERPL BCG-MCNC: 3.2 G/DL (ref 3.5–5.2)
ALBUMIN SERPL BCG-MCNC: 3.3 G/DL (ref 3.5–5.2)
ALBUMIN SERPL BCG-MCNC: 3.3 G/DL (ref 3.5–5.2)
ALBUMIN SERPL BCG-MCNC: 3.4 G/DL (ref 3.5–5.2)
ALBUMIN SERPL BCG-MCNC: 3.4 G/DL (ref 3.5–5.2)
ALBUMIN SERPL BCG-MCNC: 3.7 G/DL (ref 3.5–5.2)
ALBUMIN SERPL BCG-MCNC: 3.7 G/DL (ref 3.5–5.2)
ALBUMIN SERPL BCG-MCNC: 3.9 G/DL (ref 3.5–5.2)
ALBUMIN UR-MCNC: 100 MG/DL
ALBUMIN UR-MCNC: 100 MG/DL
ALBUMIN UR-MCNC: 30 MG/DL
ALP SERPL-CCNC: 116 U/L (ref 40–129)
ALP SERPL-CCNC: 134 U/L (ref 40–129)
ALP SERPL-CCNC: 150 U/L (ref 40–129)
ALP SERPL-CCNC: 174 U/L (ref 40–150)
ALP SERPL-CCNC: 184 U/L (ref 40–129)
ALP SERPL-CCNC: 194 U/L (ref 40–129)
ALP SERPL-CCNC: 231 U/L (ref 40–129)
ALP SERPL-CCNC: 238 U/L (ref 40–129)
ALP SERPL-CCNC: 240 U/L (ref 40–150)
ALP SERPL-CCNC: 248 U/L (ref 40–129)
ALP SERPL-CCNC: 254 U/L (ref 40–129)
ALP SERPL-CCNC: 280 U/L (ref 40–129)
ALP SERPL-CCNC: 286 U/L (ref 40–129)
ALP SERPL-CCNC: 301 U/L (ref 40–129)
ALP SERPL-CCNC: 305 U/L (ref 40–150)
ALP SERPL-CCNC: 318 U/L (ref 40–129)
ALP SERPL-CCNC: 318 U/L (ref 40–129)
ALP SERPL-CCNC: 325 U/L (ref 40–129)
ALP SERPL-CCNC: 353 U/L (ref 40–129)
ALP SERPL-CCNC: 353 U/L (ref 40–129)
ALP SERPL-CCNC: 60 U/L (ref 40–129)
ALP SERPL-CCNC: 63 U/L (ref 40–129)
ALP SERPL-CCNC: 67 U/L (ref 40–129)
ALT SERPL W P-5'-P-CCNC: 13 U/L (ref 0–70)
ALT SERPL W P-5'-P-CCNC: 14 U/L (ref 0–70)
ALT SERPL W P-5'-P-CCNC: 15 U/L (ref 0–70)
ALT SERPL W P-5'-P-CCNC: 16 U/L (ref 0–70)
ALT SERPL W P-5'-P-CCNC: 18 U/L (ref 0–70)
ALT SERPL W P-5'-P-CCNC: 18 U/L (ref 0–70)
ALT SERPL W P-5'-P-CCNC: 19 U/L (ref 0–70)
ALT SERPL W P-5'-P-CCNC: 19 U/L (ref 0–70)
ALT SERPL W P-5'-P-CCNC: 23 U/L (ref 0–70)
ALT SERPL W P-5'-P-CCNC: 26 U/L (ref 0–70)
ALT SERPL W P-5'-P-CCNC: 26 U/L (ref 0–70)
ALT SERPL W P-5'-P-CCNC: 32 U/L (ref 0–70)
ALT SERPL W P-5'-P-CCNC: 33 U/L (ref 0–70)
ALT SERPL W P-5'-P-CCNC: 41 U/L (ref 10–50)
ALT SERPL W P-5'-P-CCNC: 41 U/L (ref 10–50)
ALT SERPL W P-5'-P-CCNC: 42 U/L (ref 0–70)
ALT SERPL W P-5'-P-CCNC: 43 U/L (ref 0–70)
ALT SERPL W P-5'-P-CCNC: 43 U/L (ref 10–50)
ALT SERPL W P-5'-P-CCNC: 47 U/L (ref 0–70)
ALT SERPL W P-5'-P-CCNC: 48 U/L (ref 0–70)
ANION GAP SERPL CALCULATED.3IONS-SCNC: 10 MMOL/L (ref 7–15)
ANION GAP SERPL CALCULATED.3IONS-SCNC: 11 MMOL/L (ref 7–15)
ANION GAP SERPL CALCULATED.3IONS-SCNC: 12 MMOL/L (ref 7–15)
ANION GAP SERPL CALCULATED.3IONS-SCNC: 13 MMOL/L (ref 7–15)
ANION GAP SERPL CALCULATED.3IONS-SCNC: 14 MMOL/L (ref 7–15)
ANION GAP SERPL CALCULATED.3IONS-SCNC: 15 MMOL/L (ref 7–15)
ANION GAP SERPL CALCULATED.3IONS-SCNC: 15 MMOL/L (ref 7–15)
ANION GAP SERPL CALCULATED.3IONS-SCNC: 16 MMOL/L (ref 7–15)
ANION GAP SERPL CALCULATED.3IONS-SCNC: 17 MMOL/L (ref 7–15)
ANION GAP SERPL CALCULATED.3IONS-SCNC: 9 MMOL/L (ref 7–15)
ANION GAP SERPL CALCULATED.3IONS-SCNC: 9 MMOL/L (ref 7–15)
ANTIBODY SCREEN: NEGATIVE
APPEARANCE UR: CLEAR
APTT PPP: 27 SECONDS (ref 22–38)
APTT PPP: 35 SECONDS (ref 22–38)
AST SERPL W P-5'-P-CCNC: 17 U/L (ref 0–45)
AST SERPL W P-5'-P-CCNC: 18 U/L (ref 0–45)
AST SERPL W P-5'-P-CCNC: 19 U/L (ref 0–45)
AST SERPL W P-5'-P-CCNC: 19 U/L (ref 0–45)
AST SERPL W P-5'-P-CCNC: 21 U/L (ref 0–45)
AST SERPL W P-5'-P-CCNC: 22 U/L (ref 0–45)
AST SERPL W P-5'-P-CCNC: 23 U/L (ref 0–45)
AST SERPL W P-5'-P-CCNC: 25 U/L (ref 0–45)
AST SERPL W P-5'-P-CCNC: 26 U/L (ref 0–45)
AST SERPL W P-5'-P-CCNC: 33 U/L (ref 0–45)
AST SERPL W P-5'-P-CCNC: 33 U/L (ref 10–50)
AST SERPL W P-5'-P-CCNC: 33 U/L (ref 10–50)
AST SERPL W P-5'-P-CCNC: 35 U/L (ref 0–45)
AST SERPL W P-5'-P-CCNC: 37 U/L (ref 0–45)
AST SERPL W P-5'-P-CCNC: 37 U/L (ref 0–45)
AST SERPL W P-5'-P-CCNC: 38 U/L (ref 10–50)
AST SERPL W P-5'-P-CCNC: 42 U/L (ref 0–45)
AST SERPL W P-5'-P-CCNC: 43 U/L (ref 0–45)
AST SERPL W P-5'-P-CCNC: 46 U/L (ref 0–45)
AST SERPL W P-5'-P-CCNC: 49 U/L (ref 0–45)
AST SERPL W P-5'-P-CCNC: 52 U/L (ref 0–45)
B-OH-BUTYR SERPL-SCNC: 0.3 MMOL/L
BACTERIA BLD CULT: ABNORMAL
BACTERIA BLD CULT: ABNORMAL
BACTERIA BLD CULT: NO GROWTH
BASE EXCESS BLDV CALC-SCNC: -10.1 MMOL/L (ref -7.7–1.9)
BASE EXCESS BLDV CALC-SCNC: -11.8 MMOL/L (ref -7.7–1.9)
BASE EXCESS BLDV CALC-SCNC: -4.3 MMOL/L (ref -7.7–1.9)
BASE EXCESS BLDV CALC-SCNC: -5.2 MMOL/L (ref -7.7–1.9)
BASE EXCESS BLDV CALC-SCNC: -5.2 MMOL/L (ref -7.7–1.9)
BASE EXCESS BLDV CALC-SCNC: -5.9 MMOL/L (ref -7.7–1.9)
BASE EXCESS BLDV CALC-SCNC: -7 MMOL/L (ref -7.7–1.9)
BASE EXCESS BLDV CALC-SCNC: -7.6 MMOL/L (ref -7.7–1.9)
BASE EXCESS BLDV CALC-SCNC: -8.7 MMOL/L (ref -7.7–1.9)
BASE EXCESS BLDV CALC-SCNC: -9.1 MMOL/L (ref -7.7–1.9)
BASE EXCESS BLDV CALC-SCNC: -9.8 MMOL/L (ref -7.7–1.9)
BASO+EOS+MONOS # BLD AUTO: ABNORMAL 10*3/UL
BASO+EOS+MONOS # BLD AUTO: ABNORMAL 10*3/UL
BASO+EOS+MONOS NFR BLD AUTO: ABNORMAL %
BASO+EOS+MONOS NFR BLD AUTO: ABNORMAL %
BASOPHILS # BLD AUTO: 0 10E3/UL (ref 0–0.2)
BASOPHILS NFR BLD AUTO: 0 %
BASOPHILS NFR BLD AUTO: 1 %
BASOPHILS NFR BLD AUTO: 1 %
BILIRUB DIRECT SERPL-MCNC: 0.42 MG/DL (ref 0–0.3)
BILIRUB DIRECT SERPL-MCNC: <0.2 MG/DL (ref 0–0.3)
BILIRUB SERPL-MCNC: 0.3 MG/DL
BILIRUB SERPL-MCNC: 0.4 MG/DL
BILIRUB SERPL-MCNC: 0.5 MG/DL
BILIRUB SERPL-MCNC: 0.6 MG/DL
BILIRUB SERPL-MCNC: 1 MG/DL
BILIRUB UR QL STRIP: NEGATIVE
BUN SERPL-MCNC: 11 MG/DL (ref 8–23)
BUN SERPL-MCNC: 11.2 MG/DL (ref 8–23)
BUN SERPL-MCNC: 11.5 MG/DL (ref 8–23)
BUN SERPL-MCNC: 12.2 MG/DL (ref 8–23)
BUN SERPL-MCNC: 12.8 MG/DL (ref 8–23)
BUN SERPL-MCNC: 14.3 MG/DL (ref 8–23)
BUN SERPL-MCNC: 14.5 MG/DL (ref 8–23)
BUN SERPL-MCNC: 15.1 MG/DL (ref 8–23)
BUN SERPL-MCNC: 15.6 MG/DL (ref 8–23)
BUN SERPL-MCNC: 16.7 MG/DL (ref 8–23)
BUN SERPL-MCNC: 16.7 MG/DL (ref 8–23)
BUN SERPL-MCNC: 17.2 MG/DL (ref 8–23)
BUN SERPL-MCNC: 20.1 MG/DL (ref 8–23)
BUN SERPL-MCNC: 6.6 MG/DL (ref 8–23)
BUN SERPL-MCNC: 6.8 MG/DL (ref 8–23)
BUN SERPL-MCNC: 6.8 MG/DL (ref 8–23)
BUN SERPL-MCNC: 7.1 MG/DL (ref 8–23)
BUN SERPL-MCNC: 7.4 MG/DL (ref 8–23)
BUN SERPL-MCNC: 7.6 MG/DL (ref 8–23)
BUN SERPL-MCNC: 7.9 MG/DL (ref 8–23)
BUN SERPL-MCNC: 8.2 MG/DL (ref 8–23)
BUN SERPL-MCNC: 8.3 MG/DL (ref 8–23)
BUN SERPL-MCNC: 8.8 MG/DL (ref 8–23)
BUN SERPL-MCNC: 9.2 MG/DL (ref 8–23)
BUN SERPL-MCNC: 9.6 MG/DL (ref 8–23)
C DIFF TOX B STL QL: NEGATIVE
C DIFF TOX B STL QL: NEGATIVE
C PARVUM AG STL QL IA: NEGATIVE
CALCIUM SERPL-MCNC: 10.3 MG/DL (ref 8.8–10.2)
CALCIUM SERPL-MCNC: 8.5 MG/DL (ref 8.8–10.2)
CALCIUM SERPL-MCNC: 8.9 MG/DL (ref 8.8–10.2)
CALCIUM SERPL-MCNC: 9 MG/DL (ref 8.8–10.2)
CALCIUM SERPL-MCNC: 9.1 MG/DL (ref 8.8–10.2)
CALCIUM SERPL-MCNC: 9.1 MG/DL (ref 8.8–10.2)
CALCIUM SERPL-MCNC: 9.2 MG/DL (ref 8.8–10.2)
CALCIUM SERPL-MCNC: 9.3 MG/DL (ref 8.8–10.2)
CALCIUM SERPL-MCNC: 9.3 MG/DL (ref 8.8–10.2)
CALCIUM SERPL-MCNC: 9.4 MG/DL (ref 8.8–10.2)
CALCIUM SERPL-MCNC: 9.5 MG/DL (ref 8.8–10.2)
CALCIUM SERPL-MCNC: 9.6 MG/DL (ref 8.8–10.2)
CALCIUM SERPL-MCNC: 9.6 MG/DL (ref 8.8–10.2)
CALCIUM SERPL-MCNC: 9.7 MG/DL (ref 8.8–10.2)
CALCIUM SERPL-MCNC: 9.8 MG/DL (ref 8.8–10.2)
CHLORIDE SERPL-SCNC: 102 MMOL/L (ref 98–107)
CHLORIDE SERPL-SCNC: 104 MMOL/L (ref 98–107)
CHLORIDE SERPL-SCNC: 105 MMOL/L (ref 98–107)
CHLORIDE SERPL-SCNC: 106 MMOL/L (ref 98–107)
CHLORIDE SERPL-SCNC: 107 MMOL/L (ref 98–107)
CHLORIDE SERPL-SCNC: 109 MMOL/L (ref 98–107)
CHLORIDE SERPL-SCNC: 110 MMOL/L (ref 98–107)
CHLORIDE SERPL-SCNC: 111 MMOL/L (ref 98–107)
CHLORIDE SERPL-SCNC: 112 MMOL/L (ref 98–107)
CHLORIDE SERPL-SCNC: 115 MMOL/L (ref 98–107)
CHLORIDE SERPL-SCNC: 116 MMOL/L (ref 98–107)
CHLORIDE SERPL-SCNC: 116 MMOL/L (ref 98–107)
CHLORIDE SERPL-SCNC: 119 MMOL/L (ref 98–107)
CHLORIDE SERPL-SCNC: 127 MMOL/L (ref 98–107)
CHLORIDE UR-SCNC: 60 MMOL/L
CK SERPL-CCNC: 53 U/L (ref 39–308)
COLOR UR AUTO: ABNORMAL
COLOR UR AUTO: YELLOW
COLOR UR AUTO: YELLOW
CREAT SERPL-MCNC: 0.76 MG/DL (ref 0.67–1.17)
CREAT SERPL-MCNC: 0.83 MG/DL (ref 0.67–1.17)
CREAT SERPL-MCNC: 0.88 MG/DL (ref 0.67–1.17)
CREAT SERPL-MCNC: 1 MG/DL (ref 0.67–1.17)
CREAT SERPL-MCNC: 1 MG/DL (ref 0.67–1.17)
CREAT SERPL-MCNC: 1.01 MG/DL (ref 0.67–1.17)
CREAT SERPL-MCNC: 1.04 MG/DL (ref 0.67–1.17)
CREAT SERPL-MCNC: 1.05 MG/DL (ref 0.67–1.17)
CREAT SERPL-MCNC: 1.09 MG/DL (ref 0.67–1.17)
CREAT SERPL-MCNC: 1.09 MG/DL (ref 0.67–1.17)
CREAT SERPL-MCNC: 1.1 MG/DL (ref 0.67–1.17)
CREAT SERPL-MCNC: 1.15 MG/DL (ref 0.67–1.17)
CREAT SERPL-MCNC: 1.15 MG/DL (ref 0.67–1.17)
CREAT SERPL-MCNC: 1.19 MG/DL (ref 0.67–1.17)
CREAT SERPL-MCNC: 1.22 MG/DL (ref 0.67–1.17)
CREAT SERPL-MCNC: 1.26 MG/DL (ref 0.67–1.17)
CREAT SERPL-MCNC: 1.32 MG/DL (ref 0.67–1.17)
CREAT SERPL-MCNC: 1.35 MG/DL (ref 0.67–1.17)
CREAT SERPL-MCNC: 1.41 MG/DL (ref 0.67–1.17)
CREAT SERPL-MCNC: 1.46 MG/DL (ref 0.67–1.17)
CREAT SERPL-MCNC: 1.51 MG/DL (ref 0.67–1.17)
CREAT SERPL-MCNC: 1.6 MG/DL (ref 0.67–1.17)
CREAT SERPL-MCNC: 1.74 MG/DL (ref 0.67–1.17)
CREAT SERPL-MCNC: 1.98 MG/DL (ref 0.67–1.17)
CREAT SERPL-MCNC: 2.01 MG/DL (ref 0.67–1.17)
CREAT SERPL-MCNC: 2.21 MG/DL (ref 0.67–1.17)
CREAT SERPL-MCNC: 2.5 MG/DL (ref 0.67–1.17)
CREAT UR-MCNC: 36.9 MG/DL
CRP SERPL-MCNC: 15.45 MG/L
CRP SERPL-MCNC: 30.12 MG/L
DEPRECATED HCO3 PLAS-SCNC: 11 MMOL/L (ref 22–29)
DEPRECATED HCO3 PLAS-SCNC: 12 MMOL/L (ref 22–29)
DEPRECATED HCO3 PLAS-SCNC: 12 MMOL/L (ref 22–29)
DEPRECATED HCO3 PLAS-SCNC: 13 MMOL/L (ref 22–29)
DEPRECATED HCO3 PLAS-SCNC: 14 MMOL/L (ref 22–29)
DEPRECATED HCO3 PLAS-SCNC: 15 MMOL/L (ref 22–29)
DEPRECATED HCO3 PLAS-SCNC: 16 MMOL/L (ref 22–29)
DEPRECATED HCO3 PLAS-SCNC: 16 MMOL/L (ref 22–29)
DEPRECATED HCO3 PLAS-SCNC: 17 MMOL/L (ref 22–29)
DEPRECATED HCO3 PLAS-SCNC: 17 MMOL/L (ref 22–29)
DEPRECATED HCO3 PLAS-SCNC: 18 MMOL/L (ref 22–29)
DEPRECATED HCO3 PLAS-SCNC: 19 MMOL/L (ref 22–29)
DEPRECATED HCO3 PLAS-SCNC: 22 MMOL/L (ref 22–29)
DEPRECATED HCO3 PLAS-SCNC: 23 MMOL/L (ref 22–29)
DEPRECATED HCO3 PLAS-SCNC: 24 MMOL/L (ref 22–29)
DEPRECATED HCO3 PLAS-SCNC: 26 MMOL/L (ref 22–29)
DEPRECATED HCO3 PLAS-SCNC: 9 MMOL/L (ref 22–29)
EGFRCR SERPLBLD CKD-EPI 2021: 27 ML/MIN/1.73M2
EGFRCR SERPLBLD CKD-EPI 2021: 31 ML/MIN/1.73M2
EGFRCR SERPLBLD CKD-EPI 2021: 35 ML/MIN/1.73M2
EGFRCR SERPLBLD CKD-EPI 2021: 35 ML/MIN/1.73M2
EGFRCR SERPLBLD CKD-EPI 2021: 41 ML/MIN/1.73M2
EGFRCR SERPLBLD CKD-EPI 2021: 45 ML/MIN/1.73M2
EGFRCR SERPLBLD CKD-EPI 2021: 49 ML/MIN/1.73M2
EGFRCR SERPLBLD CKD-EPI 2021: 51 ML/MIN/1.73M2
EGFRCR SERPLBLD CKD-EPI 2021: 57 ML/MIN/1.73M2
EGFRCR SERPLBLD CKD-EPI 2021: 61 ML/MIN/1.73M2
EGFRCR SERPLBLD CKD-EPI 2021: 68 ML/MIN/1.73M2
EGFRCR SERPLBLD CKD-EPI 2021: 71 ML/MIN/1.73M2
EGFRCR SERPLBLD CKD-EPI 2021: 72 ML/MIN/1.73M2
EGFRCR SERPLBLD CKD-EPI 2021: 72 ML/MIN/1.73M2
EGFRCR SERPLBLD CKD-EPI 2021: 75 ML/MIN/1.73M2
EGFRCR SERPLBLD CKD-EPI 2021: 76 ML/MIN/1.73M2
EGFRCR SERPLBLD CKD-EPI 2021: 79 ML/MIN/1.73M2
EGFRCR SERPLBLD CKD-EPI 2021: 80 ML/MIN/1.73M2
EGFRCR SERPLBLD CKD-EPI 2021: 80 ML/MIN/1.73M2
EGFRCR SERPLBLD CKD-EPI 2021: >90 ML/MIN/1.73M2
ENTEROCOCCUS FAECALIS: NOT DETECTED
ENTEROCOCCUS FAECIUM: NOT DETECTED
EOSINOPHIL # BLD AUTO: 0.1 10E3/UL (ref 0–0.7)
EOSINOPHIL # BLD AUTO: 0.2 10E3/UL (ref 0–0.7)
EOSINOPHIL # BLD AUTO: 0.2 10E3/UL (ref 0–0.7)
EOSINOPHIL # BLD AUTO: 0.3 10E3/UL (ref 0–0.7)
EOSINOPHIL NFR BLD AUTO: 1 %
EOSINOPHIL NFR BLD AUTO: 2 %
EOSINOPHIL NFR BLD AUTO: 4 %
EOSINOPHIL NFR BLD AUTO: 4 %
ERCP: NORMAL
ERYTHROCYTE [DISTWIDTH] IN BLOOD BY AUTOMATED COUNT: 14.3 % (ref 10–15)
ERYTHROCYTE [DISTWIDTH] IN BLOOD BY AUTOMATED COUNT: 15.3 % (ref 10–15)
ERYTHROCYTE [DISTWIDTH] IN BLOOD BY AUTOMATED COUNT: 15.7 % (ref 10–15)
ERYTHROCYTE [DISTWIDTH] IN BLOOD BY AUTOMATED COUNT: 16.1 % (ref 10–15)
ERYTHROCYTE [DISTWIDTH] IN BLOOD BY AUTOMATED COUNT: 16.5 % (ref 10–15)
ERYTHROCYTE [DISTWIDTH] IN BLOOD BY AUTOMATED COUNT: 16.8 % (ref 10–15)
ERYTHROCYTE [DISTWIDTH] IN BLOOD BY AUTOMATED COUNT: 17.1 % (ref 10–15)
ERYTHROCYTE [DISTWIDTH] IN BLOOD BY AUTOMATED COUNT: 17.1 % (ref 10–15)
ERYTHROCYTE [DISTWIDTH] IN BLOOD BY AUTOMATED COUNT: 17.6 % (ref 10–15)
ERYTHROCYTE [DISTWIDTH] IN BLOOD BY AUTOMATED COUNT: 17.6 % (ref 10–15)
ERYTHROCYTE [DISTWIDTH] IN BLOOD BY AUTOMATED COUNT: 17.9 % (ref 10–15)
ERYTHROCYTE [DISTWIDTH] IN BLOOD BY AUTOMATED COUNT: 18.4 % (ref 10–15)
ERYTHROCYTE [DISTWIDTH] IN BLOOD BY AUTOMATED COUNT: 19.1 % (ref 10–15)
ERYTHROCYTE [DISTWIDTH] IN BLOOD BY AUTOMATED COUNT: 19.5 % (ref 10–15)
ERYTHROCYTE [DISTWIDTH] IN BLOOD BY AUTOMATED COUNT: 19.8 % (ref 10–15)
G LAMBLIA AG STL QL IA: NEGATIVE
GFR SERPL CREATININE-BSD FRML MDRD: 53 ML/MIN/1.73M2
GFR SERPL CREATININE-BSD FRML MDRD: 56 ML/MIN/1.73M2
GFR SERPL CREATININE-BSD FRML MDRD: 63 ML/MIN/1.73M2
GFR SERPL CREATININE-BSD FRML MDRD: 65 ML/MIN/1.73M2
GFR SERPL CREATININE-BSD FRML MDRD: 68 ML/MIN/1.73M2
GLUCOSE BLDC GLUCOMTR-MCNC: 100 MG/DL (ref 70–99)
GLUCOSE BLDC GLUCOMTR-MCNC: 101 MG/DL (ref 70–99)
GLUCOSE BLDC GLUCOMTR-MCNC: 103 MG/DL (ref 70–99)
GLUCOSE BLDC GLUCOMTR-MCNC: 122 MG/DL (ref 70–99)
GLUCOSE BLDC GLUCOMTR-MCNC: 61 MG/DL (ref 70–99)
GLUCOSE BLDC GLUCOMTR-MCNC: 70 MG/DL (ref 70–99)
GLUCOSE BLDC GLUCOMTR-MCNC: 73 MG/DL (ref 70–99)
GLUCOSE BLDC GLUCOMTR-MCNC: 75 MG/DL (ref 70–99)
GLUCOSE BLDC GLUCOMTR-MCNC: 77 MG/DL (ref 70–99)
GLUCOSE BLDC GLUCOMTR-MCNC: 79 MG/DL (ref 70–99)
GLUCOSE BLDC GLUCOMTR-MCNC: 85 MG/DL (ref 70–99)
GLUCOSE BLDC GLUCOMTR-MCNC: 88 MG/DL (ref 70–99)
GLUCOSE BLDC GLUCOMTR-MCNC: 88 MG/DL (ref 70–99)
GLUCOSE BLDC GLUCOMTR-MCNC: 90 MG/DL (ref 70–99)
GLUCOSE BLDC GLUCOMTR-MCNC: 90 MG/DL (ref 70–99)
GLUCOSE BLDC GLUCOMTR-MCNC: 92 MG/DL (ref 70–99)
GLUCOSE BLDC GLUCOMTR-MCNC: 93 MG/DL (ref 70–99)
GLUCOSE BLDC GLUCOMTR-MCNC: 93 MG/DL (ref 70–99)
GLUCOSE BLDC GLUCOMTR-MCNC: 96 MG/DL (ref 70–99)
GLUCOSE BLDC GLUCOMTR-MCNC: 97 MG/DL (ref 70–99)
GLUCOSE BLDC GLUCOMTR-MCNC: 97 MG/DL (ref 70–99)
GLUCOSE BLDC GLUCOMTR-MCNC: 98 MG/DL (ref 70–99)
GLUCOSE BLDC GLUCOMTR-MCNC: 98 MG/DL (ref 70–99)
GLUCOSE BLDC GLUCOMTR-MCNC: 99 MG/DL (ref 70–99)
GLUCOSE BLDC GLUCOMTR-MCNC: 99 MG/DL (ref 70–99)
GLUCOSE SERPL-MCNC: 101 MG/DL (ref 70–99)
GLUCOSE SERPL-MCNC: 101 MG/DL (ref 70–99)
GLUCOSE SERPL-MCNC: 102 MG/DL (ref 70–99)
GLUCOSE SERPL-MCNC: 112 MG/DL (ref 70–99)
GLUCOSE SERPL-MCNC: 117 MG/DL (ref 70–99)
GLUCOSE SERPL-MCNC: 136 MG/DL (ref 70–99)
GLUCOSE SERPL-MCNC: 178 MG/DL (ref 70–99)
GLUCOSE SERPL-MCNC: 73 MG/DL (ref 70–99)
GLUCOSE SERPL-MCNC: 79 MG/DL (ref 70–99)
GLUCOSE SERPL-MCNC: 81 MG/DL (ref 70–99)
GLUCOSE SERPL-MCNC: 81 MG/DL (ref 70–99)
GLUCOSE SERPL-MCNC: 82 MG/DL (ref 70–99)
GLUCOSE SERPL-MCNC: 84 MG/DL (ref 70–99)
GLUCOSE SERPL-MCNC: 85 MG/DL (ref 70–99)
GLUCOSE SERPL-MCNC: 87 MG/DL (ref 70–99)
GLUCOSE SERPL-MCNC: 87 MG/DL (ref 70–99)
GLUCOSE SERPL-MCNC: 88 MG/DL (ref 70–99)
GLUCOSE SERPL-MCNC: 89 MG/DL (ref 70–99)
GLUCOSE SERPL-MCNC: 91 MG/DL (ref 70–99)
GLUCOSE SERPL-MCNC: 93 MG/DL (ref 70–99)
GLUCOSE SERPL-MCNC: 93 MG/DL (ref 70–99)
GLUCOSE SERPL-MCNC: 95 MG/DL (ref 70–99)
GLUCOSE SERPL-MCNC: 99 MG/DL (ref 70–99)
GLUCOSE UR STRIP-MCNC: NEGATIVE MG/DL
HCO3 BLDV-SCNC: 15 MMOL/L (ref 21–28)
HCO3 BLDV-SCNC: 16 MMOL/L (ref 21–28)
HCO3 BLDV-SCNC: 16 MMOL/L (ref 21–28)
HCO3 BLDV-SCNC: 17 MMOL/L (ref 21–28)
HCO3 BLDV-SCNC: 17 MMOL/L (ref 21–28)
HCO3 BLDV-SCNC: 18 MMOL/L (ref 21–28)
HCO3 BLDV-SCNC: 20 MMOL/L (ref 21–28)
HCO3 BLDV-SCNC: 21 MMOL/L (ref 21–28)
HCT VFR BLD AUTO: 28.4 % (ref 40–53)
HCT VFR BLD AUTO: 28.8 % (ref 40–53)
HCT VFR BLD AUTO: 29.1 % (ref 40–53)
HCT VFR BLD AUTO: 29.1 % (ref 40–53)
HCT VFR BLD AUTO: 30.7 % (ref 40–53)
HCT VFR BLD AUTO: 30.9 % (ref 40–53)
HCT VFR BLD AUTO: 31.3 % (ref 40–53)
HCT VFR BLD AUTO: 33 % (ref 40–53)
HCT VFR BLD AUTO: 33.8 % (ref 40–53)
HCT VFR BLD AUTO: 35.2 % (ref 40–53)
HCT VFR BLD AUTO: 36 % (ref 40–53)
HCT VFR BLD AUTO: 36.5 % (ref 40–53)
HCT VFR BLD AUTO: 36.6 % (ref 40–53)
HCT VFR BLD AUTO: 37.6 % (ref 40–53)
HCT VFR BLD AUTO: 38.2 % (ref 40–53)
HEMOCCULT STL QL: NEGATIVE
HGB BLD-MCNC: 10.4 G/DL (ref 13.3–17.7)
HGB BLD-MCNC: 10.5 G/DL (ref 13.3–17.7)
HGB BLD-MCNC: 10.7 G/DL (ref 13.3–17.7)
HGB BLD-MCNC: 11.2 G/DL (ref 13.3–17.7)
HGB BLD-MCNC: 11.6 G/DL (ref 13.3–17.7)
HGB BLD-MCNC: 12 G/DL (ref 13.3–17.7)
HGB BLD-MCNC: 12.2 G/DL (ref 13.3–17.7)
HGB BLD-MCNC: 12.6 G/DL (ref 13.3–17.7)
HGB BLD-MCNC: 12.6 G/DL (ref 13.3–17.7)
HGB BLD-MCNC: 12.8 G/DL (ref 13.3–17.7)
HGB BLD-MCNC: 13 G/DL (ref 13.3–17.7)
HGB BLD-MCNC: 9 G/DL (ref 13.3–17.7)
HGB BLD-MCNC: 9.3 G/DL (ref 13.3–17.7)
HGB BLD-MCNC: 9.6 G/DL (ref 13.3–17.7)
HGB BLD-MCNC: 9.8 G/DL (ref 13.3–17.7)
HGB BLD-MCNC: 9.9 G/DL (ref 13.3–17.7)
HGB BLD-MCNC: 9.9 G/DL (ref 13.3–17.7)
HGB UR QL STRIP: ABNORMAL
HGB UR QL STRIP: NEGATIVE
HGB UR QL STRIP: NEGATIVE
HOLD SPECIMEN: NORMAL
IMM GRANULOCYTES # BLD: 0 10E3/UL
IMM GRANULOCYTES NFR BLD: 0 %
INR PPP: 1.14 (ref 0.85–1.15)
INR PPP: 1.28 (ref 0.85–1.15)
INR PPP: 1.29 (ref 0.85–1.15)
KETONES UR STRIP-MCNC: NEGATIVE MG/DL
LACTATE SERPL-SCNC: 0.7 MMOL/L (ref 0.7–2)
LACTATE SERPL-SCNC: 1.2 MMOL/L (ref 0.7–2)
LACTATE SERPL-SCNC: 1.2 MMOL/L (ref 0.7–2)
LACTATE SERPL-SCNC: 1.3 MMOL/L (ref 0.7–2)
LACTATE SERPL-SCNC: 1.9 MMOL/L (ref 0.7–2)
LACTATE SERPL-SCNC: 2.3 MMOL/L (ref 0.7–2)
LEUKOCYTE ESTERASE UR QL STRIP: NEGATIVE
LIPASE SERPL-CCNC: 102 U/L (ref 13–60)
LIPASE SERPL-CCNC: 132 U/L (ref 13–60)
LIPASE SERPL-CCNC: 1943 U/L (ref 13–60)
LIPASE SERPL-CCNC: 64 U/L (ref 13–60)
LIPASE SERPL-CCNC: 92 U/L (ref 13–60)
LISTERIA SPECIES (DETECTED/NOT DETECTED): NOT DETECTED
LVEF ECHO: NORMAL
LYMPHOCYTES # BLD AUTO: 0.5 10E3/UL (ref 0.8–5.3)
LYMPHOCYTES # BLD AUTO: 0.7 10E3/UL (ref 0.8–5.3)
LYMPHOCYTES # BLD AUTO: 0.7 10E3/UL (ref 0.8–5.3)
LYMPHOCYTES # BLD AUTO: 0.8 10E3/UL (ref 0.8–5.3)
LYMPHOCYTES # BLD AUTO: 1 10E3/UL (ref 0.8–5.3)
LYMPHOCYTES # BLD AUTO: 1 10E3/UL (ref 0.8–5.3)
LYMPHOCYTES NFR BLD AUTO: 10 %
LYMPHOCYTES NFR BLD AUTO: 14 %
LYMPHOCYTES NFR BLD AUTO: 16 %
LYMPHOCYTES NFR BLD AUTO: 7 %
LYMPHOCYTES NFR BLD AUTO: 7 %
LYMPHOCYTES NFR BLD AUTO: 9 %
MAGNESIUM SERPL-MCNC: 1.6 MG/DL (ref 1.7–2.3)
MAGNESIUM SERPL-MCNC: 2 MG/DL (ref 1.7–2.3)
MAGNESIUM SERPL-MCNC: 2.1 MG/DL (ref 1.7–2.3)
MAGNESIUM SERPL-MCNC: 2.2 MG/DL (ref 1.7–2.3)
MAGNESIUM SERPL-MCNC: 2.2 MG/DL (ref 1.7–2.3)
MAGNESIUM SERPL-MCNC: 2.3 MG/DL (ref 1.7–2.3)
MAGNESIUM SERPL-MCNC: 2.3 MG/DL (ref 1.7–2.3)
MCH RBC QN AUTO: 26.3 PG (ref 26.5–33)
MCH RBC QN AUTO: 26.6 PG (ref 26.5–33)
MCH RBC QN AUTO: 27.3 PG (ref 26.5–33)
MCH RBC QN AUTO: 27.3 PG (ref 26.5–33)
MCH RBC QN AUTO: 27.4 PG (ref 26.5–33)
MCH RBC QN AUTO: 27.4 PG (ref 26.5–33)
MCH RBC QN AUTO: 27.5 PG (ref 26.5–33)
MCH RBC QN AUTO: 27.6 PG (ref 26.5–33)
MCH RBC QN AUTO: 27.8 PG (ref 26.5–33)
MCH RBC QN AUTO: 27.8 PG (ref 26.5–33)
MCH RBC QN AUTO: 28 PG (ref 26.5–33)
MCH RBC QN AUTO: 28 PG (ref 26.5–33)
MCH RBC QN AUTO: 28.6 PG (ref 26.5–33)
MCHC RBC AUTO-ENTMCNC: 29.7 G/DL (ref 31.5–36.5)
MCHC RBC AUTO-ENTMCNC: 30.9 G/DL (ref 31.5–36.5)
MCHC RBC AUTO-ENTMCNC: 31.8 G/DL (ref 31.5–36.5)
MCHC RBC AUTO-ENTMCNC: 31.8 G/DL (ref 31.5–36.5)
MCHC RBC AUTO-ENTMCNC: 32.2 G/DL (ref 31.5–36.5)
MCHC RBC AUTO-ENTMCNC: 33.3 G/DL (ref 31.5–36.5)
MCHC RBC AUTO-ENTMCNC: 33.4 G/DL (ref 31.5–36.5)
MCHC RBC AUTO-ENTMCNC: 33.7 G/DL (ref 31.5–36.5)
MCHC RBC AUTO-ENTMCNC: 33.7 G/DL (ref 31.5–36.5)
MCHC RBC AUTO-ENTMCNC: 33.8 G/DL (ref 31.5–36.5)
MCHC RBC AUTO-ENTMCNC: 34 G/DL (ref 31.5–36.5)
MCHC RBC AUTO-ENTMCNC: 34 G/DL (ref 31.5–36.5)
MCHC RBC AUTO-ENTMCNC: 34.3 G/DL (ref 31.5–36.5)
MCHC RBC AUTO-ENTMCNC: 34.4 G/DL (ref 31.5–36.5)
MCHC RBC AUTO-ENTMCNC: 34.4 G/DL (ref 31.5–36.5)
MCV RBC AUTO: 80 FL (ref 78–100)
MCV RBC AUTO: 81 FL (ref 78–100)
MCV RBC AUTO: 82 FL (ref 78–100)
MCV RBC AUTO: 82 FL (ref 78–100)
MCV RBC AUTO: 83 FL (ref 78–100)
MCV RBC AUTO: 84 FL (ref 78–100)
MCV RBC AUTO: 86 FL (ref 78–100)
MCV RBC AUTO: 87 FL (ref 78–100)
MCV RBC AUTO: 88 FL (ref 78–100)
MICROALBUMIN UR-MCNC: <12 MG/L
MICROALBUMIN/CREAT UR: NORMAL MG/G{CREAT}
MONOCYTES # BLD AUTO: 0.7 10E3/UL (ref 0–1.3)
MONOCYTES # BLD AUTO: 0.7 10E3/UL (ref 0–1.3)
MONOCYTES # BLD AUTO: 0.9 10E3/UL (ref 0–1.3)
MONOCYTES # BLD AUTO: 0.9 10E3/UL (ref 0–1.3)
MONOCYTES # BLD AUTO: 1.1 10E3/UL (ref 0–1.3)
MONOCYTES # BLD AUTO: 1.1 10E3/UL (ref 0–1.3)
MONOCYTES NFR BLD AUTO: 11 %
MONOCYTES NFR BLD AUTO: 13 %
MONOCYTES NFR BLD AUTO: 14 %
MONOCYTES NFR BLD AUTO: 15 %
MONOCYTES NFR BLD AUTO: 9 %
MONOCYTES NFR BLD AUTO: 9 %
MUCOUS THREADS #/AREA URNS LPF: PRESENT /LPF
MUCOUS THREADS #/AREA URNS LPF: PRESENT /LPF
NEUTROPHILS # BLD AUTO: 4 10E3/UL (ref 1.6–8.3)
NEUTROPHILS # BLD AUTO: 4.7 10E3/UL (ref 1.6–8.3)
NEUTROPHILS # BLD AUTO: 5.1 10E3/UL (ref 1.6–8.3)
NEUTROPHILS # BLD AUTO: 6.6 10E3/UL (ref 1.6–8.3)
NEUTROPHILS # BLD AUTO: 6.7 10E3/UL (ref 1.6–8.3)
NEUTROPHILS # BLD AUTO: 8.5 10E3/UL (ref 1.6–8.3)
NEUTROPHILS NFR BLD AUTO: 68 %
NEUTROPHILS NFR BLD AUTO: 70 %
NEUTROPHILS NFR BLD AUTO: 71 %
NEUTROPHILS NFR BLD AUTO: 76 %
NEUTROPHILS NFR BLD AUTO: 83 %
NEUTROPHILS NFR BLD AUTO: 83 %
NITRATE UR QL: NEGATIVE
NRBC # BLD AUTO: 0 10E3/UL
NRBC BLD AUTO-RTO: 0 /100
NT-PROBNP SERPL-MCNC: 683 PG/ML (ref 0–900)
O2/TOTAL GAS SETTING VFR VENT: 21 %
PATH REPORT.COMMENTS IMP SPEC: NORMAL
PATH REPORT.COMMENTS IMP SPEC: NORMAL
PATH REPORT.FINAL DX SPEC: NORMAL
PATH REPORT.GROSS SPEC: NORMAL
PATH REPORT.MICROSCOPIC SPEC OTHER STN: NORMAL
PATH REPORT.RELEVANT HX SPEC: NORMAL
PCO2 BLDV: 27 MM HG (ref 40–50)
PCO2 BLDV: 27 MM HG (ref 40–50)
PCO2 BLDV: 29 MM HG (ref 40–50)
PCO2 BLDV: 29 MM HG (ref 40–50)
PCO2 BLDV: 30 MM HG (ref 40–50)
PCO2 BLDV: 34 MM HG (ref 40–50)
PCO2 BLDV: 36 MM HG (ref 40–50)
PCO2 BLDV: 37 MM HG (ref 40–50)
PCO2 BLDV: 37 MM HG (ref 40–50)
PCO2 BLDV: 38 MM HG (ref 40–50)
PCO2 BLDV: 38 MM HG (ref 40–50)
PCO2 BLDV: 39 MM HG (ref 40–50)
PCO2 BLDV: 43 MM HG (ref 40–50)
PH BLDV: 7.18 [PH] (ref 7.32–7.43)
PH BLDV: 7.26 [PH] (ref 7.32–7.43)
PH BLDV: 7.27 [PH] (ref 7.32–7.43)
PH BLDV: 7.31 [PH] (ref 7.32–7.43)
PH BLDV: 7.31 [PH] (ref 7.32–7.43)
PH BLDV: 7.32 [PH] (ref 7.32–7.43)
PH BLDV: 7.32 [PH] (ref 7.32–7.43)
PH BLDV: 7.33 [PH] (ref 7.32–7.43)
PH BLDV: 7.35 [PH] (ref 7.32–7.43)
PH BLDV: 7.35 [PH] (ref 7.32–7.43)
PH BLDV: 7.36 [PH] (ref 7.32–7.43)
PH BLDV: 7.37 [PH] (ref 7.32–7.43)
PH BLDV: 7.37 [PH] (ref 7.32–7.43)
PH UR STRIP: 7 [PH] (ref 5–7)
PH UR STRIP: 7 [PH] (ref 5–7)
PH UR STRIP: 8 [PH] (ref 5–7)
PHOSPHATE SERPL-MCNC: 1.7 MG/DL (ref 2.5–4.5)
PHOSPHATE SERPL-MCNC: 2.6 MG/DL (ref 2.5–4.5)
PHOSPHATE SERPL-MCNC: 2.6 MG/DL (ref 2.5–4.5)
PHOSPHATE SERPL-MCNC: 2.7 MG/DL (ref 2.5–4.5)
PHOSPHATE SERPL-MCNC: 3.3 MG/DL (ref 2.5–4.5)
PHOSPHATE SERPL-MCNC: 3.4 MG/DL (ref 2.5–4.5)
PHOSPHATE SERPL-MCNC: 3.7 MG/DL (ref 2.5–4.5)
PHOTO IMAGE: NORMAL
PLATELET # BLD AUTO: 162 10E3/UL (ref 150–450)
PLATELET # BLD AUTO: 183 10E3/UL (ref 150–450)
PLATELET # BLD AUTO: 193 10E3/UL (ref 150–450)
PLATELET # BLD AUTO: 197 10E3/UL (ref 150–450)
PLATELET # BLD AUTO: 218 10E3/UL (ref 150–450)
PLATELET # BLD AUTO: 222 10E3/UL (ref 150–450)
PLATELET # BLD AUTO: 223 10E3/UL (ref 150–450)
PLATELET # BLD AUTO: 225 10E3/UL (ref 150–450)
PLATELET # BLD AUTO: 229 10E3/UL (ref 150–450)
PLATELET # BLD AUTO: 243 10E3/UL (ref 150–450)
PLATELET # BLD AUTO: 246 10E3/UL (ref 150–450)
PLATELET # BLD AUTO: 246 10E3/UL (ref 150–450)
PLATELET # BLD AUTO: 283 10E3/UL (ref 150–450)
PLATELET # BLD AUTO: 298 10E3/UL (ref 150–450)
PLATELET # BLD AUTO: 301 10E3/UL (ref 150–450)
PLATELET # BLD AUTO: 318 10E3/UL (ref 150–450)
PO2 BLDV: 20 MM HG (ref 25–47)
PO2 BLDV: 23 MM HG (ref 25–47)
PO2 BLDV: 25 MM HG (ref 25–47)
PO2 BLDV: 25 MM HG (ref 25–47)
PO2 BLDV: 27 MM HG (ref 25–47)
PO2 BLDV: 28 MM HG (ref 25–47)
PO2 BLDV: 30 MM HG (ref 25–47)
PO2 BLDV: 38 MM HG (ref 25–47)
PO2 BLDV: 47 MM HG (ref 25–47)
PO2 BLDV: 49 MM HG (ref 25–47)
PO2 BLDV: 58 MM HG (ref 25–47)
PO2 BLDV: 67 MM HG (ref 25–47)
PO2 BLDV: 70 MM HG (ref 25–47)
POTASSIUM SERPL-SCNC: 1.6 MMOL/L (ref 3.4–5.3)
POTASSIUM SERPL-SCNC: 2.1 MMOL/L (ref 3.4–5.3)
POTASSIUM SERPL-SCNC: 2.2 MMOL/L (ref 3.4–5.3)
POTASSIUM SERPL-SCNC: 2.4 MMOL/L (ref 3.4–5.3)
POTASSIUM SERPL-SCNC: 2.5 MMOL/L (ref 3.4–5.3)
POTASSIUM SERPL-SCNC: 2.6 MMOL/L (ref 3.4–5.3)
POTASSIUM SERPL-SCNC: 2.7 MMOL/L (ref 3.4–5.3)
POTASSIUM SERPL-SCNC: 2.7 MMOL/L (ref 3.4–5.3)
POTASSIUM SERPL-SCNC: 2.8 MMOL/L (ref 3.4–5.3)
POTASSIUM SERPL-SCNC: 2.9 MMOL/L (ref 3.4–5.3)
POTASSIUM SERPL-SCNC: 3 MMOL/L (ref 3.4–5.3)
POTASSIUM SERPL-SCNC: 3.1 MMOL/L (ref 3.4–5.3)
POTASSIUM SERPL-SCNC: 3.1 MMOL/L (ref 3.4–5.3)
POTASSIUM SERPL-SCNC: 3.2 MMOL/L (ref 3.4–5.3)
POTASSIUM SERPL-SCNC: 3.3 MMOL/L (ref 3.4–5.3)
POTASSIUM SERPL-SCNC: 3.4 MMOL/L (ref 3.4–5.3)
POTASSIUM SERPL-SCNC: 3.5 MMOL/L (ref 3.4–5.3)
POTASSIUM SERPL-SCNC: 3.6 MMOL/L (ref 3.4–5.3)
POTASSIUM SERPL-SCNC: 3.7 MMOL/L (ref 3.4–5.3)
POTASSIUM SERPL-SCNC: 3.9 MMOL/L (ref 3.4–5.3)
POTASSIUM SERPL-SCNC: 3.9 MMOL/L (ref 3.4–5.3)
POTASSIUM SERPL-SCNC: 4 MMOL/L (ref 3.4–5.3)
POTASSIUM SERPL-SCNC: 4 MMOL/L (ref 3.4–5.3)
POTASSIUM SERPL-SCNC: 4.3 MMOL/L (ref 3.4–5.3)
POTASSIUM SERPL-SCNC: 4.6 MMOL/L (ref 3.4–5.3)
POTASSIUM SERPL-SCNC: 4.7 MMOL/L (ref 3.4–5.3)
POTASSIUM SERPL-SCNC: <1.5 MMOL/L (ref 3.4–5.3)
POTASSIUM UR-SCNC: 40.3 MMOL/L
PROCALCITONIN SERPL IA-MCNC: 0.38 NG/ML
PROCALCITONIN SERPL IA-MCNC: 5.93 NG/ML
PROT SERPL-MCNC: 5 G/DL (ref 6.4–8.3)
PROT SERPL-MCNC: 5.1 G/DL (ref 6.4–8.3)
PROT SERPL-MCNC: 5.3 G/DL (ref 6.4–8.3)
PROT SERPL-MCNC: 5.4 G/DL (ref 6.4–8.3)
PROT SERPL-MCNC: 5.5 G/DL (ref 6.4–8.3)
PROT SERPL-MCNC: 5.6 G/DL (ref 6.4–8.3)
PROT SERPL-MCNC: 5.7 G/DL (ref 6.4–8.3)
PROT SERPL-MCNC: 5.9 G/DL (ref 6.4–8.3)
PROT SERPL-MCNC: 6 G/DL (ref 6.4–8.3)
PROT SERPL-MCNC: 6.1 G/DL (ref 6.4–8.3)
PROT SERPL-MCNC: 6.2 G/DL (ref 6.4–8.3)
PROT SERPL-MCNC: 6.2 G/DL (ref 6.4–8.3)
PROT SERPL-MCNC: 6.3 G/DL (ref 6.4–8.3)
PROT SERPL-MCNC: 6.4 G/DL (ref 6.4–8.3)
PROT SERPL-MCNC: 6.5 G/DL (ref 6.4–8.3)
PROT SERPL-MCNC: 7 G/DL (ref 6.4–8.3)
PSA SERPL DL<=0.01 NG/ML-MCNC: 0.05 NG/ML (ref 0–6.5)
RBC # BLD AUTO: 3.38 10E6/UL (ref 4.4–5.9)
RBC # BLD AUTO: 3.51 10E6/UL (ref 4.4–5.9)
RBC # BLD AUTO: 3.53 10E6/UL (ref 4.4–5.9)
RBC # BLD AUTO: 3.54 10E6/UL (ref 4.4–5.9)
RBC # BLD AUTO: 3.54 10E6/UL (ref 4.4–5.9)
RBC # BLD AUTO: 3.59 10E6/UL (ref 4.4–5.9)
RBC # BLD AUTO: 3.8 10E6/UL (ref 4.4–5.9)
RBC # BLD AUTO: 3.84 10E6/UL (ref 4.4–5.9)
RBC # BLD AUTO: 4.06 10E6/UL (ref 4.4–5.9)
RBC # BLD AUTO: 4.15 10E6/UL (ref 4.4–5.9)
RBC # BLD AUTO: 4.34 10E6/UL (ref 4.4–5.9)
RBC # BLD AUTO: 4.43 10E6/UL (ref 4.4–5.9)
RBC # BLD AUTO: 4.47 10E6/UL (ref 4.4–5.9)
RBC # BLD AUTO: 4.53 10E6/UL (ref 4.4–5.9)
RBC # BLD AUTO: 4.76 10E6/UL (ref 4.4–5.9)
RBC URINE: 1 /HPF
RBC URINE: <1 /HPF
RBC URINE: <1 /HPF
SARS-COV-2 RNA RESP QL NAA+PROBE: NEGATIVE
SODIUM SERPL-SCNC: 133 MMOL/L (ref 135–145)
SODIUM SERPL-SCNC: 135 MMOL/L (ref 135–145)
SODIUM SERPL-SCNC: 135 MMOL/L (ref 135–145)
SODIUM SERPL-SCNC: 136 MMOL/L (ref 135–145)
SODIUM SERPL-SCNC: 137 MMOL/L (ref 135–145)
SODIUM SERPL-SCNC: 138 MMOL/L (ref 135–145)
SODIUM SERPL-SCNC: 138 MMOL/L (ref 136–145)
SODIUM SERPL-SCNC: 139 MMOL/L (ref 135–145)
SODIUM SERPL-SCNC: 139 MMOL/L (ref 135–145)
SODIUM SERPL-SCNC: 140 MMOL/L (ref 135–145)
SODIUM SERPL-SCNC: 140 MMOL/L (ref 136–145)
SODIUM SERPL-SCNC: 142 MMOL/L (ref 135–145)
SODIUM SERPL-SCNC: 142 MMOL/L (ref 135–145)
SODIUM SERPL-SCNC: 143 MMOL/L (ref 135–145)
SODIUM SERPL-SCNC: 143 MMOL/L (ref 136–145)
SODIUM SERPL-SCNC: 144 MMOL/L (ref 135–145)
SODIUM SERPL-SCNC: 145 MMOL/L (ref 135–145)
SP GR UR STRIP: 1 (ref 1–1.03)
SP GR UR STRIP: 1 (ref 1–1.03)
SP GR UR STRIP: 1.03 (ref 1–1.03)
SPECIMEN EXPIRATION DATE: NORMAL
SQUAMOUS EPITHELIAL: <1 /HPF
STAPHYLOCOCCUS AUREUS: NOT DETECTED
STAPHYLOCOCCUS EPIDERMIDIS: NOT DETECTED
STAPHYLOCOCCUS LUGDUNENSIS: NOT DETECTED
STAPHYLOCOCCUS SPECIES: NOT DETECTED
STREPTOCOCCUS AGALACTIAE: NOT DETECTED
STREPTOCOCCUS ANGINOSUS GROUP: NOT DETECTED
STREPTOCOCCUS PNEUMONIAE: NOT DETECTED
STREPTOCOCCUS PYOGENES: NOT DETECTED
STREPTOCOCCUS SPECIES: NOT DETECTED
T4 FREE SERPL-MCNC: 0.83 NG/DL (ref 0.9–1.7)
T4 FREE SERPL-MCNC: 0.87 NG/DL (ref 0.9–1.7)
T4 FREE SERPL-MCNC: 1.6 NG/DL (ref 0.9–1.7)
T4 FREE SERPL-MCNC: 1.7 NG/DL (ref 0.9–1.7)
TROPONIN T SERPL HS-MCNC: 13 NG/L
TSH SERPL DL<=0.005 MIU/L-ACNC: 0.04 UIU/ML (ref 0.3–4.2)
TSH SERPL DL<=0.005 MIU/L-ACNC: 0.06 UIU/ML (ref 0.3–4.2)
TSH SERPL DL<=0.005 MIU/L-ACNC: 1.36 UIU/ML (ref 0.3–4.2)
TSH SERPL DL<=0.005 MIU/L-ACNC: 4.64 UIU/ML (ref 0.3–4.2)
TSH SERPL DL<=0.005 MIU/L-ACNC: 4.77 UIU/ML (ref 0.3–4.2)
UPPER EUS: NORMAL
UROBILINOGEN UR STRIP-MCNC: 4 MG/DL
UROBILINOGEN UR STRIP-MCNC: NORMAL MG/DL
UROBILINOGEN UR STRIP-MCNC: NORMAL MG/DL
VANCOMYCIN SERPL-MCNC: 12.7 UG/ML
VANCOMYCIN SERPL-MCNC: 21.8 UG/ML
VIT D+METAB SERPL-MCNC: 29 NG/ML (ref 20–50)
WBC # BLD AUTO: 10.1 10E3/UL (ref 4–11)
WBC # BLD AUTO: 10.2 10E3/UL (ref 4–11)
WBC # BLD AUTO: 5.3 10E3/UL (ref 4–11)
WBC # BLD AUTO: 5.9 10E3/UL (ref 4–11)
WBC # BLD AUTO: 5.9 10E3/UL (ref 4–11)
WBC # BLD AUTO: 6 10E3/UL (ref 4–11)
WBC # BLD AUTO: 6.4 10E3/UL (ref 4–11)
WBC # BLD AUTO: 6.5 10E3/UL (ref 4–11)
WBC # BLD AUTO: 6.6 10E3/UL (ref 4–11)
WBC # BLD AUTO: 6.9 10E3/UL (ref 4–11)
WBC # BLD AUTO: 7.4 10E3/UL (ref 4–11)
WBC # BLD AUTO: 7.8 10E3/UL (ref 4–11)
WBC # BLD AUTO: 8 10E3/UL (ref 4–11)
WBC # BLD AUTO: 8.7 10E3/UL (ref 4–11)
WBC # BLD AUTO: 9.7 10E3/UL (ref 4–11)
WBC URINE: 0 /HPF
WBC URINE: 1 /HPF
WBC URINE: 2 /HPF

## 2023-01-01 PROCEDURE — 360N000076 HC SURGERY LEVEL 3, PER MIN: Performed by: SURGERY

## 2023-01-01 PROCEDURE — 87329 GIARDIA AG IA: CPT | Performed by: PEDIATRICS

## 2023-01-01 PROCEDURE — 93010 ELECTROCARDIOGRAM REPORT: CPT | Performed by: NURSE PRACTITIONER

## 2023-01-01 PROCEDURE — 84443 ASSAY THYROID STIM HORMONE: CPT | Performed by: STUDENT IN AN ORGANIZED HEALTH CARE EDUCATION/TRAINING PROGRAM

## 2023-01-01 PROCEDURE — 80053 COMPREHEN METABOLIC PANEL: CPT | Performed by: FAMILY MEDICINE

## 2023-01-01 PROCEDURE — 97116 GAIT TRAINING THERAPY: CPT | Mod: GP

## 2023-01-01 PROCEDURE — 250N000013 HC RX MED GY IP 250 OP 250 PS 637: Performed by: INTERNAL MEDICINE

## 2023-01-01 PROCEDURE — 250N000011 HC RX IP 250 OP 636: Mod: JZ | Performed by: NURSE PRACTITIONER

## 2023-01-01 PROCEDURE — 36415 COLL VENOUS BLD VENIPUNCTURE: CPT | Performed by: HOSPITALIST

## 2023-01-01 PROCEDURE — 36415 COLL VENOUS BLD VENIPUNCTURE: CPT | Performed by: INTERNAL MEDICINE

## 2023-01-01 PROCEDURE — 96376 TX/PRO/DX INJ SAME DRUG ADON: CPT | Performed by: FAMILY MEDICINE

## 2023-01-01 PROCEDURE — 250N000013 HC RX MED GY IP 250 OP 250 PS 637: Performed by: HOSPITALIST

## 2023-01-01 PROCEDURE — 120N000001 HC R&B MED SURG/OB

## 2023-01-01 PROCEDURE — 36415 COLL VENOUS BLD VENIPUNCTURE: CPT | Performed by: PEDIATRICS

## 2023-01-01 PROCEDURE — 85730 THROMBOPLASTIN TIME PARTIAL: CPT | Performed by: STUDENT IN AN ORGANIZED HEALTH CARE EDUCATION/TRAINING PROGRAM

## 2023-01-01 PROCEDURE — 83735 ASSAY OF MAGNESIUM: CPT | Performed by: FAMILY MEDICINE

## 2023-01-01 PROCEDURE — 84443 ASSAY THYROID STIM HORMONE: CPT

## 2023-01-01 PROCEDURE — 80053 COMPREHEN METABOLIC PANEL: CPT | Performed by: INTERNAL MEDICINE

## 2023-01-01 PROCEDURE — 255N000002 HC RX 255 OP 636: Performed by: INTERNAL MEDICINE

## 2023-01-01 PROCEDURE — 96361 HYDRATE IV INFUSION ADD-ON: CPT | Performed by: STUDENT IN AN ORGANIZED HEALTH CARE EDUCATION/TRAINING PROGRAM

## 2023-01-01 PROCEDURE — 80053 COMPREHEN METABOLIC PANEL: CPT | Performed by: PEDIATRICS

## 2023-01-01 PROCEDURE — 47562 LAPAROSCOPIC CHOLECYSTECTOMY: CPT | Mod: AS | Performed by: PHYSICIAN ASSISTANT

## 2023-01-01 PROCEDURE — 83605 ASSAY OF LACTIC ACID: CPT | Performed by: HOSPITALIST

## 2023-01-01 PROCEDURE — 80053 COMPREHEN METABOLIC PANEL: CPT | Performed by: HOSPITALIST

## 2023-01-01 PROCEDURE — 85025 COMPLETE CBC W/AUTO DIFF WBC: CPT

## 2023-01-01 PROCEDURE — 80053 COMPREHEN METABOLIC PANEL: CPT | Performed by: PHYSICIAN ASSISTANT

## 2023-01-01 PROCEDURE — 84439 ASSAY OF FREE THYROXINE: CPT | Performed by: STUDENT IN AN ORGANIZED HEALTH CARE EDUCATION/TRAINING PROGRAM

## 2023-01-01 PROCEDURE — 250N000013 HC RX MED GY IP 250 OP 250 PS 637: Performed by: PHYSICIAN ASSISTANT

## 2023-01-01 PROCEDURE — 85027 COMPLETE CBC AUTOMATED: CPT | Performed by: HOSPITALIST

## 2023-01-01 PROCEDURE — 97165 OT EVAL LOW COMPLEX 30 MIN: CPT | Mod: GO | Performed by: OCCUPATIONAL THERAPIST

## 2023-01-01 PROCEDURE — 93356 MYOCRD STRAIN IMG SPCKL TRCK: CPT | Performed by: INTERNAL MEDICINE

## 2023-01-01 PROCEDURE — 96365 THER/PROPH/DIAG IV INF INIT: CPT | Mod: 59 | Performed by: STUDENT IN AN ORGANIZED HEALTH CARE EDUCATION/TRAINING PROGRAM

## 2023-01-01 PROCEDURE — 250N000011 HC RX IP 250 OP 636: Mod: JZ | Performed by: INTERNAL MEDICINE

## 2023-01-01 PROCEDURE — 93306 TTE W/DOPPLER COMPLETE: CPT

## 2023-01-01 PROCEDURE — 250N000013 HC RX MED GY IP 250 OP 250 PS 637: Performed by: PEDIATRICS

## 2023-01-01 PROCEDURE — 250N000013 HC RX MED GY IP 250 OP 250 PS 637: Performed by: FAMILY MEDICINE

## 2023-01-01 PROCEDURE — 250N000011 HC RX IP 250 OP 636: Performed by: PHYSICIAN ASSISTANT

## 2023-01-01 PROCEDURE — 710N000009 HC RECOVERY PHASE 1, LEVEL 1, PER MIN: Performed by: SURGERY

## 2023-01-01 PROCEDURE — 83605 ASSAY OF LACTIC ACID: CPT | Performed by: STUDENT IN AN ORGANIZED HEALTH CARE EDUCATION/TRAINING PROGRAM

## 2023-01-01 PROCEDURE — 82803 BLOOD GASES ANY COMBINATION: CPT | Performed by: PEDIATRICS

## 2023-01-01 PROCEDURE — 96365 THER/PROPH/DIAG IV INF INIT: CPT

## 2023-01-01 PROCEDURE — 250N000011 HC RX IP 250 OP 636: Mod: JZ | Performed by: HOSPITALIST

## 2023-01-01 PROCEDURE — 96367 TX/PROPH/DG ADDL SEQ IV INF: CPT | Performed by: FAMILY MEDICINE

## 2023-01-01 PROCEDURE — 84439 ASSAY OF FREE THYROXINE: CPT | Performed by: PEDIATRICS

## 2023-01-01 PROCEDURE — 90662 IIV NO PRSV INCREASED AG IM: CPT | Performed by: FAMILY MEDICINE

## 2023-01-01 PROCEDURE — 85018 HEMOGLOBIN: CPT | Performed by: PEDIATRICS

## 2023-01-01 PROCEDURE — 76705 ECHO EXAM OF ABDOMEN: CPT

## 2023-01-01 PROCEDURE — 83690 ASSAY OF LIPASE: CPT | Performed by: FAMILY MEDICINE

## 2023-01-01 PROCEDURE — 99418 PROLNG IP/OBS E/M EA 15 MIN: CPT | Performed by: PEDIATRICS

## 2023-01-01 PROCEDURE — 36415 COLL VENOUS BLD VENIPUNCTURE: CPT | Performed by: PHYSICIAN ASSISTANT

## 2023-01-01 PROCEDURE — 81001 URINALYSIS AUTO W/SCOPE: CPT | Performed by: FAMILY MEDICINE

## 2023-01-01 PROCEDURE — 250N000009 HC RX 250: Performed by: NURSE ANESTHETIST, CERTIFIED REGISTERED

## 2023-01-01 PROCEDURE — 36415 COLL VENOUS BLD VENIPUNCTURE: CPT

## 2023-01-01 PROCEDURE — 250N000011 HC RX IP 250 OP 636: Mod: JZ | Performed by: PHYSICIAN ASSISTANT

## 2023-01-01 PROCEDURE — 99232 SBSQ HOSP IP/OBS MODERATE 35: CPT | Performed by: STUDENT IN AN ORGANIZED HEALTH CARE EDUCATION/TRAINING PROGRAM

## 2023-01-01 PROCEDURE — 84145 PROCALCITONIN (PCT): CPT | Performed by: FAMILY MEDICINE

## 2023-01-01 PROCEDURE — 80053 COMPREHEN METABOLIC PANEL: CPT | Performed by: STUDENT IN AN ORGANIZED HEALTH CARE EDUCATION/TRAINING PROGRAM

## 2023-01-01 PROCEDURE — 250N000009 HC RX 250: Performed by: SURGERY

## 2023-01-01 PROCEDURE — 250N000009 HC RX 250: Performed by: ANESTHESIOLOGY

## 2023-01-01 PROCEDURE — 47562 LAPAROSCOPIC CHOLECYSTECTOMY: CPT | Performed by: SURGERY

## 2023-01-01 PROCEDURE — 84132 ASSAY OF SERUM POTASSIUM: CPT | Performed by: INTERNAL MEDICINE

## 2023-01-01 PROCEDURE — 88304 TISSUE EXAM BY PATHOLOGIST: CPT | Mod: 26 | Performed by: PATHOLOGY

## 2023-01-01 PROCEDURE — 84075 ASSAY ALKALINE PHOSPHATASE: CPT

## 2023-01-01 PROCEDURE — 84100 ASSAY OF PHOSPHORUS: CPT | Performed by: PEDIATRICS

## 2023-01-01 PROCEDURE — 82565 ASSAY OF CREATININE: CPT

## 2023-01-01 PROCEDURE — 99214 OFFICE O/P EST MOD 30 MIN: CPT | Mod: 25 | Performed by: STUDENT IN AN ORGANIZED HEALTH CARE EDUCATION/TRAINING PROGRAM

## 2023-01-01 PROCEDURE — 96375 TX/PRO/DX INJ NEW DRUG ADDON: CPT | Performed by: FAMILY MEDICINE

## 2023-01-01 PROCEDURE — 250N000011 HC RX IP 250 OP 636: Performed by: FAMILY MEDICINE

## 2023-01-01 PROCEDURE — 87040 BLOOD CULTURE FOR BACTERIA: CPT | Performed by: INTERNAL MEDICINE

## 2023-01-01 PROCEDURE — 83735 ASSAY OF MAGNESIUM: CPT | Performed by: INTERNAL MEDICINE

## 2023-01-01 PROCEDURE — 258N000003 HC RX IP 258 OP 636: Performed by: STUDENT IN AN ORGANIZED HEALTH CARE EDUCATION/TRAINING PROGRAM

## 2023-01-01 PROCEDURE — 85027 COMPLETE CBC AUTOMATED: CPT | Performed by: INTERNAL MEDICINE

## 2023-01-01 PROCEDURE — 93306 TTE W/DOPPLER COMPLETE: CPT | Mod: 26 | Performed by: INTERNAL MEDICINE

## 2023-01-01 PROCEDURE — 97530 THERAPEUTIC ACTIVITIES: CPT | Mod: GO | Performed by: OCCUPATIONAL THERAPIST

## 2023-01-01 PROCEDURE — 80053 COMPREHEN METABOLIC PANEL: CPT

## 2023-01-01 PROCEDURE — 85004 AUTOMATED DIFF WBC COUNT: CPT

## 2023-01-01 PROCEDURE — 710N000009 HC RECOVERY PHASE 1, LEVEL 1, PER MIN: Performed by: INTERNAL MEDICINE

## 2023-01-01 PROCEDURE — 360N000083 HC SURGERY LEVEL 3 W/ FLUORO, PER MIN: Performed by: INTERNAL MEDICINE

## 2023-01-01 PROCEDURE — 84439 ASSAY OF FREE THYROXINE: CPT

## 2023-01-01 PROCEDURE — 97530 THERAPEUTIC ACTIVITIES: CPT | Mod: GP

## 2023-01-01 PROCEDURE — 82310 ASSAY OF CALCIUM: CPT | Performed by: STUDENT IN AN ORGANIZED HEALTH CARE EDUCATION/TRAINING PROGRAM

## 2023-01-01 PROCEDURE — 99207 PR NO BILLABLE SERVICE THIS VISIT: CPT | Performed by: HOSPITALIST

## 2023-01-01 PROCEDURE — 36415 COLL VENOUS BLD VENIPUNCTURE: CPT | Performed by: FAMILY MEDICINE

## 2023-01-01 PROCEDURE — 80051 ELECTROLYTE PANEL: CPT | Performed by: PEDIATRICS

## 2023-01-01 PROCEDURE — 70551 MRI BRAIN STEM W/O DYE: CPT | Mod: MG

## 2023-01-01 PROCEDURE — 85018 HEMOGLOBIN: CPT | Performed by: STUDENT IN AN ORGANIZED HEALTH CARE EDUCATION/TRAINING PROGRAM

## 2023-01-01 PROCEDURE — 85610 PROTHROMBIN TIME: CPT | Performed by: STUDENT IN AN ORGANIZED HEALTH CARE EDUCATION/TRAINING PROGRAM

## 2023-01-01 PROCEDURE — 82374 ASSAY BLOOD CARBON DIOXIDE: CPT | Performed by: PEDIATRICS

## 2023-01-01 PROCEDURE — 93005 ELECTROCARDIOGRAM TRACING: CPT | Performed by: FAMILY MEDICINE

## 2023-01-01 PROCEDURE — 85027 COMPLETE CBC AUTOMATED: CPT | Performed by: FAMILY MEDICINE

## 2023-01-01 PROCEDURE — 250N000011 HC RX IP 250 OP 636: Performed by: STUDENT IN AN ORGANIZED HEALTH CARE EDUCATION/TRAINING PROGRAM

## 2023-01-01 PROCEDURE — 250N000009 HC RX 250: Performed by: INTERNAL MEDICINE

## 2023-01-01 PROCEDURE — 84100 ASSAY OF PHOSPHORUS: CPT | Performed by: FAMILY MEDICINE

## 2023-01-01 PROCEDURE — 36415 COLL VENOUS BLD VENIPUNCTURE: CPT | Performed by: STUDENT IN AN ORGANIZED HEALTH CARE EDUCATION/TRAINING PROGRAM

## 2023-01-01 PROCEDURE — 99232 SBSQ HOSP IP/OBS MODERATE 35: CPT | Performed by: HOSPITALIST

## 2023-01-01 PROCEDURE — 85025 COMPLETE CBC W/AUTO DIFF WBC: CPT | Performed by: FAMILY MEDICINE

## 2023-01-01 PROCEDURE — 87328 CRYPTOSPORIDIUM AG IA: CPT | Performed by: PEDIATRICS

## 2023-01-01 PROCEDURE — 999N000141 HC STATISTIC PRE-PROCEDURE NURSING ASSESSMENT: Performed by: SURGERY

## 2023-01-01 PROCEDURE — 85025 COMPLETE CBC W/AUTO DIFF WBC: CPT | Performed by: STUDENT IN AN ORGANIZED HEALTH CARE EDUCATION/TRAINING PROGRAM

## 2023-01-01 PROCEDURE — 83735 ASSAY OF MAGNESIUM: CPT | Performed by: STUDENT IN AN ORGANIZED HEALTH CARE EDUCATION/TRAINING PROGRAM

## 2023-01-01 PROCEDURE — 82010 KETONE BODYS QUAN: CPT | Performed by: INTERNAL MEDICINE

## 2023-01-01 PROCEDURE — 250N000009 HC RX 250: Performed by: PHYSICIAN ASSISTANT

## 2023-01-01 PROCEDURE — 258N000003 HC RX IP 258 OP 636: Performed by: FAMILY MEDICINE

## 2023-01-01 PROCEDURE — 97165 OT EVAL LOW COMPLEX 30 MIN: CPT | Mod: GO

## 2023-01-01 PROCEDURE — 97161 PT EVAL LOW COMPLEX 20 MIN: CPT | Mod: GP

## 2023-01-01 PROCEDURE — 258N000003 HC RX IP 258 OP 636: Performed by: INTERNAL MEDICINE

## 2023-01-01 PROCEDURE — 87635 SARS-COV-2 COVID-19 AMP PRB: CPT | Performed by: FAMILY MEDICINE

## 2023-01-01 PROCEDURE — 258N000003 HC RX IP 258 OP 636: Performed by: NURSE PRACTITIONER

## 2023-01-01 PROCEDURE — 36592 COLLECT BLOOD FROM PICC: CPT

## 2023-01-01 PROCEDURE — 86140 C-REACTIVE PROTEIN: CPT | Performed by: INTERNAL MEDICINE

## 2023-01-01 PROCEDURE — 74019 RADEX ABDOMEN 2 VIEWS: CPT

## 2023-01-01 PROCEDURE — 87493 C DIFF AMPLIFIED PROBE: CPT | Performed by: INTERNAL MEDICINE

## 2023-01-01 PROCEDURE — 85027 COMPLETE CBC AUTOMATED: CPT | Performed by: STUDENT IN AN ORGANIZED HEALTH CARE EDUCATION/TRAINING PROGRAM

## 2023-01-01 PROCEDURE — 96365 THER/PROPH/DIAG IV INF INIT: CPT | Mod: 59 | Performed by: FAMILY MEDICINE

## 2023-01-01 PROCEDURE — 86850 RBC ANTIBODY SCREEN: CPT | Performed by: HOSPITALIST

## 2023-01-01 PROCEDURE — 0FC98ZZ EXTIRPATION OF MATTER FROM COMMON BILE DUCT, VIA NATURAL OR ARTIFICIAL OPENING ENDOSCOPIC: ICD-10-PCS | Performed by: INTERNAL MEDICINE

## 2023-01-01 PROCEDURE — 83605 ASSAY OF LACTIC ACID: CPT | Performed by: FAMILY MEDICINE

## 2023-01-01 PROCEDURE — 96361 HYDRATE IV INFUSION ADD-ON: CPT | Performed by: FAMILY MEDICINE

## 2023-01-01 PROCEDURE — 99232 SBSQ HOSP IP/OBS MODERATE 35: CPT | Performed by: PEDIATRICS

## 2023-01-01 PROCEDURE — 82306 VITAMIN D 25 HYDROXY: CPT | Performed by: PEDIATRICS

## 2023-01-01 PROCEDURE — 96375 TX/PRO/DX INJ NEW DRUG ADDON: CPT | Performed by: STUDENT IN AN ORGANIZED HEALTH CARE EDUCATION/TRAINING PROGRAM

## 2023-01-01 PROCEDURE — 87077 CULTURE AEROBIC IDENTIFY: CPT | Performed by: STUDENT IN AN ORGANIZED HEALTH CARE EDUCATION/TRAINING PROGRAM

## 2023-01-01 PROCEDURE — 80048 BASIC METABOLIC PNL TOTAL CA: CPT | Performed by: PEDIATRICS

## 2023-01-01 PROCEDURE — C9113 INJ PANTOPRAZOLE SODIUM, VIA: HCPCS | Mod: JZ | Performed by: INTERNAL MEDICINE

## 2023-01-01 PROCEDURE — 99232 SBSQ HOSP IP/OBS MODERATE 35: CPT | Performed by: PHYSICIAN ASSISTANT

## 2023-01-01 PROCEDURE — 84132 ASSAY OF SERUM POTASSIUM: CPT | Performed by: HOSPITALIST

## 2023-01-01 PROCEDURE — 99285 EMERGENCY DEPT VISIT HI MDM: CPT | Mod: 25 | Performed by: FAMILY MEDICINE

## 2023-01-01 PROCEDURE — 82248 BILIRUBIN DIRECT: CPT

## 2023-01-01 PROCEDURE — 84132 ASSAY OF SERUM POTASSIUM: CPT | Performed by: FAMILY MEDICINE

## 2023-01-01 PROCEDURE — 97535 SELF CARE MNGMENT TRAINING: CPT | Mod: GO

## 2023-01-01 PROCEDURE — 85730 THROMBOPLASTIN TIME PARTIAL: CPT | Performed by: FAMILY MEDICINE

## 2023-01-01 PROCEDURE — 93010 ELECTROCARDIOGRAM REPORT: CPT | Performed by: FAMILY MEDICINE

## 2023-01-01 PROCEDURE — 83880 ASSAY OF NATRIURETIC PEPTIDE: CPT | Performed by: HOSPITALIST

## 2023-01-01 PROCEDURE — G1010 CDSM STANSON: HCPCS

## 2023-01-01 PROCEDURE — 250N000011 HC RX IP 250 OP 636: Mod: JZ | Performed by: STUDENT IN AN ORGANIZED HEALTH CARE EDUCATION/TRAINING PROGRAM

## 2023-01-01 PROCEDURE — 84132 ASSAY OF SERUM POTASSIUM: CPT | Performed by: PEDIATRICS

## 2023-01-01 PROCEDURE — 370N000017 HC ANESTHESIA TECHNICAL FEE, PER MIN: Performed by: INTERNAL MEDICINE

## 2023-01-01 PROCEDURE — 85610 PROTHROMBIN TIME: CPT | Performed by: HOSPITALIST

## 2023-01-01 PROCEDURE — 87149 DNA/RNA DIRECT PROBE: CPT | Performed by: STUDENT IN AN ORGANIZED HEALTH CARE EDUCATION/TRAINING PROGRAM

## 2023-01-01 PROCEDURE — 99233 SBSQ HOSP IP/OBS HIGH 50: CPT | Performed by: SURGERY

## 2023-01-01 PROCEDURE — 99239 HOSP IP/OBS DSCHRG MGMT >30: CPT | Performed by: INTERNAL MEDICINE

## 2023-01-01 PROCEDURE — 272N000001 HC OR GENERAL SUPPLY STERILE: Performed by: INTERNAL MEDICINE

## 2023-01-01 PROCEDURE — 258N000003 HC RX IP 258 OP 636: Performed by: PHYSICIAN ASSISTANT

## 2023-01-01 PROCEDURE — 85014 HEMATOCRIT: CPT | Performed by: INTERNAL MEDICINE

## 2023-01-01 PROCEDURE — 86901 BLOOD TYPING SEROLOGIC RH(D): CPT | Performed by: HOSPITALIST

## 2023-01-01 PROCEDURE — 92610 EVALUATE SWALLOWING FUNCTION: CPT | Mod: GN | Performed by: SPEECH-LANGUAGE PATHOLOGIST

## 2023-01-01 PROCEDURE — 82248 BILIRUBIN DIRECT: CPT | Performed by: STUDENT IN AN ORGANIZED HEALTH CARE EDUCATION/TRAINING PROGRAM

## 2023-01-01 PROCEDURE — 96366 THER/PROPH/DIAG IV INF ADDON: CPT | Performed by: FAMILY MEDICINE

## 2023-01-01 PROCEDURE — 250N000011 HC RX IP 250 OP 636: Performed by: INTERNAL MEDICINE

## 2023-01-01 PROCEDURE — 99222 1ST HOSP IP/OBS MODERATE 55: CPT | Performed by: SURGERY

## 2023-01-01 PROCEDURE — 83690 ASSAY OF LIPASE: CPT | Performed by: NURSE PRACTITIONER

## 2023-01-01 PROCEDURE — 99213 OFFICE O/P EST LOW 20 MIN: CPT | Performed by: UROLOGY

## 2023-01-01 PROCEDURE — 99233 SBSQ HOSP IP/OBS HIGH 50: CPT | Performed by: INTERNAL MEDICINE

## 2023-01-01 PROCEDURE — 80048 BASIC METABOLIC PNL TOTAL CA: CPT | Performed by: INTERNAL MEDICINE

## 2023-01-01 PROCEDURE — 84100 ASSAY OF PHOSPHORUS: CPT | Performed by: STUDENT IN AN ORGANIZED HEALTH CARE EDUCATION/TRAINING PROGRAM

## 2023-01-01 PROCEDURE — 85014 HEMATOCRIT: CPT | Performed by: PHYSICIAN ASSISTANT

## 2023-01-01 PROCEDURE — 36416 COLLJ CAPILLARY BLOOD SPEC: CPT | Performed by: FAMILY MEDICINE

## 2023-01-01 PROCEDURE — 82043 UR ALBUMIN QUANTITATIVE: CPT

## 2023-01-01 PROCEDURE — 99233 SBSQ HOSP IP/OBS HIGH 50: CPT | Performed by: STUDENT IN AN ORGANIZED HEALTH CARE EDUCATION/TRAINING PROGRAM

## 2023-01-01 PROCEDURE — 250N000011 HC RX IP 250 OP 636: Mod: JZ | Performed by: NURSE ANESTHETIST, CERTIFIED REGISTERED

## 2023-01-01 PROCEDURE — 250N000011 HC RX IP 250 OP 636: Mod: JZ | Performed by: SURGERY

## 2023-01-01 PROCEDURE — 84460 ALANINE AMINO (ALT) (SGPT): CPT

## 2023-01-01 PROCEDURE — 82550 ASSAY OF CK (CPK): CPT | Performed by: FAMILY MEDICINE

## 2023-01-01 PROCEDURE — 83690 ASSAY OF LIPASE: CPT | Performed by: PHYSICIAN ASSISTANT

## 2023-01-01 PROCEDURE — 999N000141 HC STATISTIC PRE-PROCEDURE NURSING ASSESSMENT: Performed by: INTERNAL MEDICINE

## 2023-01-01 PROCEDURE — 96374 THER/PROPH/DIAG INJ IV PUSH: CPT | Performed by: FAMILY MEDICINE

## 2023-01-01 PROCEDURE — 250N000011 HC RX IP 250 OP 636: Performed by: ANESTHESIOLOGY

## 2023-01-01 PROCEDURE — 81001 URINALYSIS AUTO W/SCOPE: CPT | Performed by: STUDENT IN AN ORGANIZED HEALTH CARE EDUCATION/TRAINING PROGRAM

## 2023-01-01 PROCEDURE — 87040 BLOOD CULTURE FOR BACTERIA: CPT | Performed by: FAMILY MEDICINE

## 2023-01-01 PROCEDURE — C1769 GUIDE WIRE: HCPCS | Performed by: INTERNAL MEDICINE

## 2023-01-01 PROCEDURE — 250N000013 HC RX MED GY IP 250 OP 250 PS 637: Performed by: STUDENT IN AN ORGANIZED HEALTH CARE EDUCATION/TRAINING PROGRAM

## 2023-01-01 PROCEDURE — 370N000017 HC ANESTHESIA TECHNICAL FEE, PER MIN: Performed by: SURGERY

## 2023-01-01 PROCEDURE — 74177 CT ABD & PELVIS W/CONTRAST: CPT | Mod: MG

## 2023-01-01 PROCEDURE — 97535 SELF CARE MNGMENT TRAINING: CPT | Mod: GO | Performed by: OCCUPATIONAL THERAPIST

## 2023-01-01 PROCEDURE — 99239 HOSP IP/OBS DSCHRG MGMT >30: CPT | Performed by: HOSPITALIST

## 2023-01-01 PROCEDURE — C2617 STENT, NON-COR, TEM W/O DEL: HCPCS | Performed by: INTERNAL MEDICINE

## 2023-01-01 PROCEDURE — 87493 C DIFF AMPLIFIED PROBE: CPT | Performed by: STUDENT IN AN ORGANIZED HEALTH CARE EDUCATION/TRAINING PROGRAM

## 2023-01-01 PROCEDURE — 99207 PR NO BILLABLE SERVICE THIS VISIT: CPT | Performed by: INTERNAL MEDICINE

## 2023-01-01 PROCEDURE — 96376 TX/PRO/DX INJ SAME DRUG ADON: CPT | Performed by: STUDENT IN AN ORGANIZED HEALTH CARE EDUCATION/TRAINING PROGRAM

## 2023-01-01 PROCEDURE — 99233 SBSQ HOSP IP/OBS HIGH 50: CPT | Performed by: PEDIATRICS

## 2023-01-01 PROCEDURE — 84132 ASSAY OF SERUM POTASSIUM: CPT | Performed by: STUDENT IN AN ORGANIZED HEALTH CARE EDUCATION/TRAINING PROGRAM

## 2023-01-01 PROCEDURE — 250N000009 HC RX 250: Performed by: FAMILY MEDICINE

## 2023-01-01 PROCEDURE — 88304 TISSUE EXAM BY PATHOLOGIST: CPT | Mod: TC | Performed by: SURGERY

## 2023-01-01 PROCEDURE — G0008 ADMIN INFLUENZA VIRUS VAC: HCPCS | Performed by: FAMILY MEDICINE

## 2023-01-01 PROCEDURE — 91320 SARSCV2 VAC 30MCG TRS-SUC IM: CPT | Performed by: STUDENT IN AN ORGANIZED HEALTH CARE EDUCATION/TRAINING PROGRAM

## 2023-01-01 PROCEDURE — G0378 HOSPITAL OBSERVATION PER HR: HCPCS

## 2023-01-01 PROCEDURE — 84100 ASSAY OF PHOSPHORUS: CPT | Performed by: INTERNAL MEDICINE

## 2023-01-01 PROCEDURE — 99496 TRANSJ CARE MGMT HIGH F2F 7D: CPT | Performed by: STUDENT IN AN ORGANIZED HEALTH CARE EDUCATION/TRAINING PROGRAM

## 2023-01-01 PROCEDURE — 99223 1ST HOSP IP/OBS HIGH 75: CPT | Mod: AI | Performed by: INTERNAL MEDICINE

## 2023-01-01 PROCEDURE — 272N000001 HC OR GENERAL SUPPLY STERILE: Performed by: SURGERY

## 2023-01-01 PROCEDURE — 250N000011 HC RX IP 250 OP 636: Performed by: SURGERY

## 2023-01-01 PROCEDURE — 258N000003 HC RX IP 258 OP 636: Performed by: NURSE ANESTHETIST, CERTIFIED REGISTERED

## 2023-01-01 PROCEDURE — 74176 CT ABD & PELVIS W/O CONTRAST: CPT | Mod: MA

## 2023-01-01 PROCEDURE — 80202 ASSAY OF VANCOMYCIN: CPT

## 2023-01-01 PROCEDURE — 250N000013 HC RX MED GY IP 250 OP 250 PS 637: Performed by: NURSE PRACTITIONER

## 2023-01-01 PROCEDURE — 250N000009 HC RX 250: Performed by: PEDIATRICS

## 2023-01-01 PROCEDURE — 250N000025 HC SEVOFLURANE, PER MIN: Performed by: INTERNAL MEDICINE

## 2023-01-01 PROCEDURE — 250N000011 HC RX IP 250 OP 636: Mod: JZ | Performed by: FAMILY MEDICINE

## 2023-01-01 PROCEDURE — 99223 1ST HOSP IP/OBS HIGH 75: CPT | Mod: AI | Performed by: HOSPITALIST

## 2023-01-01 PROCEDURE — 82248 BILIRUBIN DIRECT: CPT | Performed by: FAMILY MEDICINE

## 2023-01-01 PROCEDURE — 85014 HEMATOCRIT: CPT | Performed by: HOSPITALIST

## 2023-01-01 PROCEDURE — 96365 THER/PROPH/DIAG IV INF INIT: CPT | Performed by: FAMILY MEDICINE

## 2023-01-01 PROCEDURE — 82436 ASSAY OF URINE CHLORIDE: CPT | Performed by: INTERNAL MEDICINE

## 2023-01-01 PROCEDURE — 99285 EMERGENCY DEPT VISIT HI MDM: CPT | Mod: 25 | Performed by: STUDENT IN AN ORGANIZED HEALTH CARE EDUCATION/TRAINING PROGRAM

## 2023-01-01 PROCEDURE — 71260 CT THORAX DX C+: CPT | Mod: MG

## 2023-01-01 PROCEDURE — 0F798DZ DILATION OF COMMON BILE DUCT WITH INTRALUMINAL DEVICE, VIA NATURAL OR ARTIFICIAL OPENING ENDOSCOPIC: ICD-10-PCS | Performed by: INTERNAL MEDICINE

## 2023-01-01 PROCEDURE — 83605 ASSAY OF LACTIC ACID: CPT | Performed by: INTERNAL MEDICINE

## 2023-01-01 PROCEDURE — 343N000001 HC RX 343: Performed by: RADIOLOGY

## 2023-01-01 PROCEDURE — A9537 TC99M MEBROFENIN: HCPCS | Performed by: RADIOLOGY

## 2023-01-01 PROCEDURE — 258N000003 HC RX IP 258 OP 636: Performed by: HOSPITALIST

## 2023-01-01 PROCEDURE — 250N000009 HC RX 250: Performed by: STUDENT IN AN ORGANIZED HEALTH CARE EDUCATION/TRAINING PROGRAM

## 2023-01-01 PROCEDURE — 96361 HYDRATE IV INFUSION ADD-ON: CPT

## 2023-01-01 PROCEDURE — 78226 HEPATOBILIARY SYSTEM IMAGING: CPT | Mod: MG

## 2023-01-01 PROCEDURE — 74230 X-RAY XM SWLNG FUNCJ C+: CPT

## 2023-01-01 PROCEDURE — 999N000179 XR SURGERY CARM FLUORO LESS THAN 5 MIN W STILLS: Mod: TC

## 2023-01-01 PROCEDURE — 250N000011 HC RX IP 250 OP 636: Performed by: HOSPITALIST

## 2023-01-01 PROCEDURE — 99233 SBSQ HOSP IP/OBS HIGH 50: CPT | Performed by: FAMILY MEDICINE

## 2023-01-01 PROCEDURE — 84443 ASSAY THYROID STIM HORMONE: CPT | Performed by: PEDIATRICS

## 2023-01-01 PROCEDURE — 258N000003 HC RX IP 258 OP 636: Performed by: ANESTHESIOLOGY

## 2023-01-01 PROCEDURE — 258N000001 HC RX 258: Performed by: HOSPITALIST

## 2023-01-01 PROCEDURE — 82570 ASSAY OF URINE CREATININE: CPT

## 2023-01-01 PROCEDURE — 84153 ASSAY OF PSA TOTAL: CPT

## 2023-01-01 PROCEDURE — 83735 ASSAY OF MAGNESIUM: CPT | Performed by: NURSE PRACTITIONER

## 2023-01-01 PROCEDURE — 84484 ASSAY OF TROPONIN QUANT: CPT | Performed by: STUDENT IN AN ORGANIZED HEALTH CARE EDUCATION/TRAINING PROGRAM

## 2023-01-01 PROCEDURE — 82962 GLUCOSE BLOOD TEST: CPT

## 2023-01-01 PROCEDURE — 82374 ASSAY BLOOD CARBON DIOXIDE: CPT | Performed by: STUDENT IN AN ORGANIZED HEALTH CARE EDUCATION/TRAINING PROGRAM

## 2023-01-01 PROCEDURE — 99291 CRITICAL CARE FIRST HOUR: CPT | Mod: 25 | Performed by: FAMILY MEDICINE

## 2023-01-01 PROCEDURE — 99214 OFFICE O/P EST MOD 30 MIN: CPT | Performed by: STUDENT IN AN ORGANIZED HEALTH CARE EDUCATION/TRAINING PROGRAM

## 2023-01-01 PROCEDURE — 82803 BLOOD GASES ANY COMBINATION: CPT | Performed by: FAMILY MEDICINE

## 2023-01-01 PROCEDURE — 83735 ASSAY OF MAGNESIUM: CPT | Performed by: HOSPITALIST

## 2023-01-01 PROCEDURE — 0FT44ZZ RESECTION OF GALLBLADDER, PERCUTANEOUS ENDOSCOPIC APPROACH: ICD-10-PCS | Performed by: SURGERY

## 2023-01-01 PROCEDURE — 86140 C-REACTIVE PROTEIN: CPT | Performed by: FAMILY MEDICINE

## 2023-01-01 PROCEDURE — 36593 DECLOT VASCULAR DEVICE: CPT

## 2023-01-01 PROCEDURE — 84133 ASSAY OF URINE POTASSIUM: CPT | Performed by: INTERNAL MEDICINE

## 2023-01-01 PROCEDURE — 258N000003 HC RX IP 258 OP 636: Performed by: SURGERY

## 2023-01-01 PROCEDURE — 84132 ASSAY OF SERUM POTASSIUM: CPT | Performed by: PHYSICIAN ASSISTANT

## 2023-01-01 PROCEDURE — 85049 AUTOMATED PLATELET COUNT: CPT | Performed by: PEDIATRICS

## 2023-01-01 PROCEDURE — 210N000002 HC R&B HEART CARE

## 2023-01-01 PROCEDURE — 250N000025 HC SEVOFLURANE, PER MIN: Performed by: SURGERY

## 2023-01-01 PROCEDURE — 99285 EMERGENCY DEPT VISIT HI MDM: CPT | Performed by: STUDENT IN AN ORGANIZED HEALTH CARE EDUCATION/TRAINING PROGRAM

## 2023-01-01 PROCEDURE — 85610 PROTHROMBIN TIME: CPT | Performed by: FAMILY MEDICINE

## 2023-01-01 PROCEDURE — 90480 ADMN SARSCOV2 VAC 1/ONLY CMP: CPT | Performed by: STUDENT IN AN ORGANIZED HEALTH CARE EDUCATION/TRAINING PROGRAM

## 2023-01-01 PROCEDURE — 92611 MOTION FLUOROSCOPY/SWALLOW: CPT | Mod: GN | Performed by: SPEECH-LANGUAGE PATHOLOGIST

## 2023-01-01 PROCEDURE — 82272 OCCULT BLD FECES 1-3 TESTS: CPT | Performed by: PEDIATRICS

## 2023-01-01 PROCEDURE — 83690 ASSAY OF LIPASE: CPT | Performed by: STUDENT IN AN ORGANIZED HEALTH CARE EDUCATION/TRAINING PROGRAM

## 2023-01-01 DEVICE — COTTON-LEUNG BILIARY STENT
Type: IMPLANTABLE DEVICE | Status: FUNCTIONAL
Brand: COTTON-LEUNG

## 2023-01-01 RX ORDER — DEXAMETHASONE SODIUM PHOSPHATE 4 MG/ML
INJECTION, SOLUTION INTRA-ARTICULAR; INTRALESIONAL; INTRAMUSCULAR; INTRAVENOUS; SOFT TISSUE PRN
Status: DISCONTINUED | OUTPATIENT
Start: 2023-01-01 | End: 2023-01-01

## 2023-01-01 RX ORDER — OXYCODONE HYDROCHLORIDE 5 MG/1
5-10 TABLET ORAL EVERY 6 HOURS PRN
Qty: 120 TABLET | Refills: 0 | Status: SHIPPED | OUTPATIENT
Start: 2023-01-01 | End: 2023-01-01

## 2023-01-01 RX ORDER — SCOLOPAMINE TRANSDERMAL SYSTEM 1 MG/1
1 PATCH, EXTENDED RELEASE TRANSDERMAL
Status: DISCONTINUED | OUTPATIENT
Start: 2023-01-01 | End: 2023-01-01

## 2023-01-01 RX ORDER — METHOCARBAMOL 500 MG/1
500 TABLET, FILM COATED ORAL 4 TIMES DAILY
Qty: 20 TABLET | Refills: 0 | Status: SHIPPED | OUTPATIENT
Start: 2023-01-01 | End: 2023-01-01

## 2023-01-01 RX ORDER — CITALOPRAM HYDROBROMIDE 40 MG/1
40 TABLET ORAL DAILY
Qty: 90 TABLET | Refills: 0 | Status: SHIPPED | OUTPATIENT
Start: 2023-01-01 | End: 2023-01-01

## 2023-01-01 RX ORDER — AMOXICILLIN 250 MG
1 CAPSULE ORAL 2 TIMES DAILY PRN
Status: DISCONTINUED | OUTPATIENT
Start: 2023-01-01 | End: 2023-01-01

## 2023-01-01 RX ORDER — DULOXETIN HYDROCHLORIDE 60 MG/1
60 CAPSULE, DELAYED RELEASE ORAL DAILY
Status: DISCONTINUED | OUTPATIENT
Start: 2023-01-01 | End: 2023-01-01 | Stop reason: HOSPADM

## 2023-01-01 RX ORDER — PROCHLORPERAZINE 25 MG
12.5 SUPPOSITORY, RECTAL RECTAL EVERY 12 HOURS PRN
Status: DISCONTINUED | OUTPATIENT
Start: 2023-01-01 | End: 2023-01-01 | Stop reason: HOSPADM

## 2023-01-01 RX ORDER — ONDANSETRON 4 MG/1
4 TABLET, FILM COATED ORAL EVERY 8 HOURS PRN
Qty: 21 TABLET | Refills: 0 | Status: ON HOLD | OUTPATIENT
Start: 2023-01-01 | End: 2024-01-01

## 2023-01-01 RX ORDER — LANOLIN ALCOHOL/MO/W.PET/CERES
3 CREAM (GRAM) TOPICAL
Status: DISCONTINUED | OUTPATIENT
Start: 2023-01-01 | End: 2023-01-01 | Stop reason: HOSPADM

## 2023-01-01 RX ORDER — HYDROMORPHONE HCL IN WATER/PF 6 MG/30 ML
0.4 PATIENT CONTROLLED ANALGESIA SYRINGE INTRAVENOUS EVERY 5 MIN PRN
Status: DISCONTINUED | OUTPATIENT
Start: 2023-01-01 | End: 2023-01-01 | Stop reason: HOSPADM

## 2023-01-01 RX ORDER — CEFAZOLIN SODIUM/WATER 2 G/20 ML
2 SYRINGE (ML) INTRAVENOUS SEE ADMIN INSTRUCTIONS
Status: DISCONTINUED | OUTPATIENT
Start: 2023-01-01 | End: 2023-01-01

## 2023-01-01 RX ORDER — SENNOSIDES 8.6 MG
1-2 TABLET ORAL 2 TIMES DAILY PRN
Qty: 30 TABLET | Refills: 0 | Status: ON HOLD | OUTPATIENT
Start: 2023-01-01 | End: 2023-01-01

## 2023-01-01 RX ORDER — ONDANSETRON 4 MG/1
4 TABLET, ORALLY DISINTEGRATING ORAL EVERY 6 HOURS PRN
Status: DISCONTINUED | OUTPATIENT
Start: 2023-01-01 | End: 2023-01-01 | Stop reason: HOSPADM

## 2023-01-01 RX ORDER — OXYCODONE HYDROCHLORIDE 5 MG/1
10 TABLET ORAL EVERY 4 HOURS PRN
Status: DISCONTINUED | OUTPATIENT
Start: 2023-01-01 | End: 2023-01-01 | Stop reason: HOSPADM

## 2023-01-01 RX ORDER — OXYCODONE HYDROCHLORIDE 5 MG/1
TABLET ORAL
Qty: 120 TABLET | Refills: 0 | Status: SHIPPED | OUTPATIENT
Start: 2023-01-01 | End: 2023-01-01

## 2023-01-01 RX ORDER — HYDROMORPHONE HCL IN WATER/PF 6 MG/30 ML
0.2 PATIENT CONTROLLED ANALGESIA SYRINGE INTRAVENOUS
Status: DISCONTINUED | OUTPATIENT
Start: 2023-01-01 | End: 2023-01-01

## 2023-01-01 RX ORDER — POTASSIUM CHLORIDE 7.45 MG/ML
10 INJECTION INTRAVENOUS
Status: DISCONTINUED | OUTPATIENT
Start: 2023-01-01 | End: 2023-01-01 | Stop reason: HOSPADM

## 2023-01-01 RX ORDER — ONDANSETRON 2 MG/ML
4 INJECTION INTRAMUSCULAR; INTRAVENOUS EVERY 30 MIN PRN
Status: COMPLETED | OUTPATIENT
Start: 2023-01-01 | End: 2023-01-01

## 2023-01-01 RX ORDER — SODIUM CHLORIDE 9 MG/ML
INJECTION, SOLUTION INTRAVENOUS CONTINUOUS
Status: DISCONTINUED | OUTPATIENT
Start: 2023-01-01 | End: 2023-01-01

## 2023-01-01 RX ORDER — OXYCODONE HYDROCHLORIDE 5 MG/1
5 TABLET ORAL EVERY 6 HOURS PRN
Qty: 20 TABLET | Refills: 0 | Status: SHIPPED | OUTPATIENT
Start: 2023-01-01 | End: 2023-01-01

## 2023-01-01 RX ORDER — AMOXICILLIN 250 MG
1 CAPSULE ORAL 2 TIMES DAILY PRN
Status: DISCONTINUED | OUTPATIENT
Start: 2023-01-01 | End: 2023-01-01 | Stop reason: HOSPADM

## 2023-01-01 RX ORDER — LEVOTHYROXINE SODIUM 125 UG/1
125 TABLET ORAL DAILY
Qty: 90 TABLET | Refills: 0 | Status: SHIPPED | OUTPATIENT
Start: 2023-01-01 | End: 2023-01-01

## 2023-01-01 RX ORDER — PROCHLORPERAZINE MALEATE 5 MG
5 TABLET ORAL EVERY 6 HOURS PRN
Status: DISCONTINUED | OUTPATIENT
Start: 2023-01-01 | End: 2023-01-01 | Stop reason: HOSPADM

## 2023-01-01 RX ORDER — ACETAMINOPHEN 325 MG/1
325-650 TABLET ORAL EVERY 6 HOURS PRN
Qty: 40 TABLET | Refills: 0 | Status: SHIPPED | OUTPATIENT
Start: 2023-01-01

## 2023-01-01 RX ORDER — POTASSIUM CHLORIDE 1500 MG/1
40 TABLET, EXTENDED RELEASE ORAL DAILY
Status: DISCONTINUED | OUTPATIENT
Start: 2023-01-01 | End: 2023-01-01 | Stop reason: HOSPADM

## 2023-01-01 RX ORDER — ZOLPIDEM TARTRATE 5 MG/1
5 TABLET ORAL
Status: DISCONTINUED | OUTPATIENT
Start: 2023-01-01 | End: 2023-01-01 | Stop reason: HOSPADM

## 2023-01-01 RX ORDER — DIPHENOXYLATE HCL/ATROPINE 2.5-.025/5
5 LIQUID (ML) ORAL 2 TIMES DAILY
Status: DISCONTINUED | OUTPATIENT
Start: 2023-01-01 | End: 2023-01-01

## 2023-01-01 RX ORDER — POTASSIUM CHLORIDE 750 MG/1
10 TABLET, EXTENDED RELEASE ORAL 2 TIMES DAILY
Status: DISCONTINUED | OUTPATIENT
Start: 2023-01-01 | End: 2023-01-01

## 2023-01-01 RX ORDER — CITALOPRAM HYDROBROMIDE 20 MG/1
40 TABLET ORAL DAILY
Status: DISCONTINUED | OUTPATIENT
Start: 2023-01-01 | End: 2023-01-01 | Stop reason: HOSPADM

## 2023-01-01 RX ORDER — PROPOFOL 10 MG/ML
INJECTION, EMULSION INTRAVENOUS PRN
Status: DISCONTINUED | OUTPATIENT
Start: 2023-01-01 | End: 2023-01-01

## 2023-01-01 RX ORDER — NALOXONE HYDROCHLORIDE 0.4 MG/ML
0.4 INJECTION, SOLUTION INTRAMUSCULAR; INTRAVENOUS; SUBCUTANEOUS
Status: DISCONTINUED | OUTPATIENT
Start: 2023-01-01 | End: 2023-01-01 | Stop reason: HOSPADM

## 2023-01-01 RX ORDER — METRONIDAZOLE 500 MG/1
500 TABLET ORAL 2 TIMES DAILY
Qty: 6 TABLET | Refills: 0 | Status: SHIPPED | OUTPATIENT
Start: 2023-01-01 | End: 2023-01-01

## 2023-01-01 RX ORDER — OXYCODONE HYDROCHLORIDE 5 MG/1
5-10 TABLET ORAL EVERY 6 HOURS PRN
Qty: 120 TABLET | Refills: 0 | Status: ON HOLD | OUTPATIENT
Start: 2023-01-01 | End: 2023-01-01

## 2023-01-01 RX ORDER — LIDOCAINE HYDROCHLORIDE 20 MG/ML
INJECTION, SOLUTION INFILTRATION; PERINEURAL PRN
Status: DISCONTINUED | OUTPATIENT
Start: 2023-01-01 | End: 2023-01-01

## 2023-01-01 RX ORDER — POTASSIUM CHLORIDE 7.45 MG/ML
10 INJECTION INTRAVENOUS
Status: COMPLETED | OUTPATIENT
Start: 2023-01-01 | End: 2023-01-01

## 2023-01-01 RX ORDER — POTASSIUM CHLORIDE 1500 MG/1
20 TABLET, EXTENDED RELEASE ORAL ONCE
Status: COMPLETED | OUTPATIENT
Start: 2023-01-01 | End: 2023-01-01

## 2023-01-01 RX ORDER — OXYCODONE HYDROCHLORIDE 5 MG/1
5 TABLET ORAL ONCE
Status: COMPLETED | OUTPATIENT
Start: 2023-01-01 | End: 2023-01-01

## 2023-01-01 RX ORDER — POTASSIUM CHLORIDE 1500 MG/1
40 TABLET, EXTENDED RELEASE ORAL ONCE
Status: COMPLETED | OUTPATIENT
Start: 2023-01-01 | End: 2023-01-01

## 2023-01-01 RX ORDER — POTASSIUM CHLORIDE 1.5 G/1.58G
40 POWDER, FOR SOLUTION ORAL 3 TIMES DAILY
Qty: 180 PACKET | Refills: 0 | Status: SHIPPED | OUTPATIENT
Start: 2023-01-01 | End: 2023-01-01

## 2023-01-01 RX ORDER — ONDANSETRON 2 MG/ML
4 INJECTION INTRAMUSCULAR; INTRAVENOUS EVERY 30 MIN PRN
Status: DISCONTINUED | OUTPATIENT
Start: 2023-01-01 | End: 2023-01-01 | Stop reason: HOSPADM

## 2023-01-01 RX ORDER — CHOLESTYRAMINE 4 G/9G
1 POWDER, FOR SUSPENSION ORAL 2 TIMES DAILY WITH MEALS
Qty: 60 PACKET | Refills: 0 | Status: SHIPPED | OUTPATIENT
Start: 2023-01-01 | End: 2023-01-01

## 2023-01-01 RX ORDER — NALOXONE HYDROCHLORIDE 0.4 MG/ML
0.2 INJECTION, SOLUTION INTRAMUSCULAR; INTRAVENOUS; SUBCUTANEOUS
Status: DISCONTINUED | OUTPATIENT
Start: 2023-01-01 | End: 2023-01-01 | Stop reason: HOSPADM

## 2023-01-01 RX ORDER — EPINEPHRINE 1 MG/ML
0.3 INJECTION, SOLUTION, CONCENTRATE INTRAVENOUS EVERY 5 MIN PRN
Status: CANCELLED | OUTPATIENT
Start: 2023-01-01

## 2023-01-01 RX ORDER — SODIUM CHLORIDE, SODIUM LACTATE, POTASSIUM CHLORIDE, CALCIUM CHLORIDE 600; 310; 30; 20 MG/100ML; MG/100ML; MG/100ML; MG/100ML
INJECTION, SOLUTION INTRAVENOUS CONTINUOUS
Status: DISCONTINUED | OUTPATIENT
Start: 2023-01-01 | End: 2023-01-01 | Stop reason: HOSPADM

## 2023-01-01 RX ORDER — MORPHINE SULFATE 2 MG/ML
2 INJECTION, SOLUTION INTRAMUSCULAR; INTRAVENOUS EVERY 4 HOURS PRN
Status: DISCONTINUED | OUTPATIENT
Start: 2023-01-01 | End: 2023-01-01

## 2023-01-01 RX ORDER — OXYCODONE AND ACETAMINOPHEN 5; 325 MG/1; MG/1
1 TABLET ORAL EVERY 6 HOURS PRN
Qty: 12 TABLET | Refills: 0 | Status: SHIPPED | OUTPATIENT
Start: 2023-01-01 | End: 2023-01-01

## 2023-01-01 RX ORDER — ONDANSETRON 4 MG/1
4 TABLET, ORALLY DISINTEGRATING ORAL EVERY 6 HOURS PRN
Status: DISCONTINUED | OUTPATIENT
Start: 2023-01-01 | End: 2023-01-01

## 2023-01-01 RX ORDER — OMEPRAZOLE 40 MG/1
40 CAPSULE, DELAYED RELEASE ORAL DAILY PRN
Start: 2023-01-01 | End: 2023-01-01 | Stop reason: ALTCHOICE

## 2023-01-01 RX ORDER — POTASSIUM CHLORIDE 1.5 G/1.58G
40 POWDER, FOR SOLUTION ORAL ONCE
Status: COMPLETED | OUTPATIENT
Start: 2023-01-01 | End: 2023-01-01

## 2023-01-01 RX ORDER — ROSUVASTATIN CALCIUM 20 MG/1
40 TABLET, COATED ORAL DAILY
Status: DISCONTINUED | OUTPATIENT
Start: 2023-01-01 | End: 2023-01-01 | Stop reason: HOSPADM

## 2023-01-01 RX ORDER — METOPROLOL SUCCINATE 25 MG/1
25 TABLET, EXTENDED RELEASE ORAL DAILY
Status: DISCONTINUED | OUTPATIENT
Start: 2023-01-01 | End: 2023-01-01 | Stop reason: HOSPADM

## 2023-01-01 RX ORDER — FENTANYL CITRATE 50 UG/ML
50 INJECTION, SOLUTION INTRAMUSCULAR; INTRAVENOUS ONCE
Status: COMPLETED | OUTPATIENT
Start: 2023-01-01 | End: 2023-01-01

## 2023-01-01 RX ORDER — CEFAZOLIN SODIUM/WATER 2 G/20 ML
2 SYRINGE (ML) INTRAVENOUS
Status: DISCONTINUED | OUTPATIENT
Start: 2023-01-01 | End: 2023-01-01

## 2023-01-01 RX ORDER — ONDANSETRON 2 MG/ML
4 INJECTION INTRAMUSCULAR; INTRAVENOUS
Status: COMPLETED | OUTPATIENT
Start: 2023-01-01 | End: 2023-01-01

## 2023-01-01 RX ORDER — PREGABALIN 150 MG/1
150 CAPSULE ORAL 2 TIMES DAILY
Qty: 120 CAPSULE | Refills: 1 | Status: SHIPPED | OUTPATIENT
Start: 2023-01-01 | End: 2023-01-01

## 2023-01-01 RX ORDER — LEVOTHYROXINE SODIUM 100 UG/1
100 TABLET ORAL DAILY
Status: DISCONTINUED | OUTPATIENT
Start: 2023-01-01 | End: 2023-01-01 | Stop reason: HOSPADM

## 2023-01-01 RX ORDER — FENTANYL CITRATE 0.05 MG/ML
25 INJECTION, SOLUTION INTRAMUSCULAR; INTRAVENOUS EVERY 5 MIN PRN
Status: DISCONTINUED | OUTPATIENT
Start: 2023-01-01 | End: 2023-01-01 | Stop reason: HOSPADM

## 2023-01-01 RX ORDER — ENOXAPARIN SODIUM 100 MG/ML
40 INJECTION SUBCUTANEOUS DAILY
Status: DISCONTINUED | OUTPATIENT
Start: 2023-01-01 | End: 2023-01-01 | Stop reason: HOSPADM

## 2023-01-01 RX ORDER — DIPHENHYDRAMINE HYDROCHLORIDE 50 MG/ML
50 INJECTION INTRAMUSCULAR; INTRAVENOUS
Status: CANCELLED
Start: 2023-01-01

## 2023-01-01 RX ORDER — ONDANSETRON 4 MG/1
4 TABLET, ORALLY DISINTEGRATING ORAL EVERY 8 HOURS PRN
Qty: 10 TABLET | Refills: 0 | Status: SHIPPED | OUTPATIENT
Start: 2023-01-01 | End: 2023-01-01

## 2023-01-01 RX ORDER — RAMELTEON 8 MG/1
8 TABLET ORAL AT BEDTIME
Qty: 15 TABLET | Refills: 0 | Status: SHIPPED | OUTPATIENT
Start: 2023-01-01 | End: 2023-01-01

## 2023-01-01 RX ORDER — DEXTROSE MONOHYDRATE 25 G/50ML
25-50 INJECTION, SOLUTION INTRAVENOUS
Status: DISCONTINUED | OUTPATIENT
Start: 2023-01-01 | End: 2023-01-01 | Stop reason: HOSPADM

## 2023-01-01 RX ORDER — POTASSIUM CHLORIDE 7.45 MG/ML
10 INJECTION INTRAVENOUS
Status: DISCONTINUED | OUTPATIENT
Start: 2023-01-01 | End: 2023-01-01 | Stop reason: ALTCHOICE

## 2023-01-01 RX ORDER — POTASSIUM CHLORIDE 20MEQ/15ML
20 LIQUID (ML) ORAL ONCE
Status: COMPLETED | OUTPATIENT
Start: 2023-01-01 | End: 2023-01-01

## 2023-01-01 RX ORDER — LOPERAMIDE HCL 1 MG/7.5ML
2 SUSPENSION ORAL 2 TIMES DAILY
Status: DISCONTINUED | OUTPATIENT
Start: 2023-01-01 | End: 2023-01-01

## 2023-01-01 RX ORDER — EPHEDRINE SULFATE 50 MG/ML
INJECTION, SOLUTION INTRAMUSCULAR; INTRAVENOUS; SUBCUTANEOUS PRN
Status: DISCONTINUED | OUTPATIENT
Start: 2023-01-01 | End: 2023-01-01

## 2023-01-01 RX ORDER — HYDROMORPHONE HCL IN WATER/PF 6 MG/30 ML
0.4 PATIENT CONTROLLED ANALGESIA SYRINGE INTRAVENOUS
Status: DISCONTINUED | OUTPATIENT
Start: 2023-01-01 | End: 2023-01-01 | Stop reason: HOSPADM

## 2023-01-01 RX ORDER — FENTANYL CITRATE 50 UG/ML
25 INJECTION, SOLUTION INTRAMUSCULAR; INTRAVENOUS EVERY 5 MIN PRN
Status: DISCONTINUED | OUTPATIENT
Start: 2023-01-01 | End: 2023-01-01 | Stop reason: HOSPADM

## 2023-01-01 RX ORDER — NALOXONE HYDROCHLORIDE 0.4 MG/ML
0.2 INJECTION, SOLUTION INTRAMUSCULAR; INTRAVENOUS; SUBCUTANEOUS
Status: DISCONTINUED | OUTPATIENT
Start: 2023-01-01 | End: 2023-01-01

## 2023-01-01 RX ORDER — ACETAMINOPHEN 325 MG/1
650 TABLET ORAL EVERY 6 HOURS PRN
Status: DISCONTINUED | OUTPATIENT
Start: 2023-01-01 | End: 2023-01-01 | Stop reason: HOSPADM

## 2023-01-01 RX ORDER — POTASSIUM CHLORIDE 7.45 MG/ML
10 INJECTION INTRAVENOUS
Status: DISCONTINUED | OUTPATIENT
Start: 2023-01-01 | End: 2023-01-01

## 2023-01-01 RX ORDER — SENNOSIDES 8.6 MG
8.6 TABLET ORAL 2 TIMES DAILY
Status: DISCONTINUED | OUTPATIENT
Start: 2023-01-01 | End: 2023-01-01

## 2023-01-01 RX ORDER — PANTOPRAZOLE SODIUM 40 MG/1
40 TABLET, DELAYED RELEASE ORAL DAILY
COMMUNITY
End: 2024-01-01

## 2023-01-01 RX ORDER — OXYCODONE HYDROCHLORIDE 10 MG/1
TABLET ORAL
Qty: 60 TABLET | Refills: 0 | OUTPATIENT
Start: 2023-01-01

## 2023-01-01 RX ORDER — AMOXICILLIN 250 MG
2 CAPSULE ORAL 2 TIMES DAILY PRN
Status: DISCONTINUED | OUTPATIENT
Start: 2023-01-01 | End: 2023-01-01 | Stop reason: HOSPADM

## 2023-01-01 RX ORDER — AMOXICILLIN 875 MG
875 TABLET ORAL 2 TIMES DAILY
Qty: 20 TABLET | Refills: 0 | Status: SHIPPED | OUTPATIENT
Start: 2023-01-01 | End: 2023-01-01

## 2023-01-01 RX ORDER — IOPAMIDOL 755 MG/ML
500 INJECTION, SOLUTION INTRAVASCULAR ONCE
Status: COMPLETED | OUTPATIENT
Start: 2023-01-01 | End: 2023-01-01

## 2023-01-01 RX ORDER — OXYCODONE HYDROCHLORIDE 5 MG/1
5 TABLET ORAL EVERY 4 HOURS PRN
Status: DISCONTINUED | OUTPATIENT
Start: 2023-01-01 | End: 2023-01-01

## 2023-01-01 RX ORDER — CETIRIZINE HYDROCHLORIDE 10 MG/1
10 TABLET ORAL DAILY
Status: DISCONTINUED | OUTPATIENT
Start: 2023-01-01 | End: 2023-01-01 | Stop reason: HOSPADM

## 2023-01-01 RX ORDER — DEXTROSE, SODIUM CHLORIDE, SODIUM LACTATE, POTASSIUM CHLORIDE, AND CALCIUM CHLORIDE 5; .6; .31; .03; .02 G/100ML; G/100ML; G/100ML; G/100ML; G/100ML
INJECTION, SOLUTION INTRAVENOUS CONTINUOUS
Status: DISCONTINUED | OUTPATIENT
Start: 2023-01-01 | End: 2023-01-01

## 2023-01-01 RX ORDER — METHYLPREDNISOLONE SODIUM SUCCINATE 125 MG/2ML
125 INJECTION, POWDER, LYOPHILIZED, FOR SOLUTION INTRAMUSCULAR; INTRAVENOUS
Status: CANCELLED
Start: 2023-01-01

## 2023-01-01 RX ORDER — PREGABALIN 150 MG/1
150 CAPSULE ORAL DAILY
Status: DISCONTINUED | OUTPATIENT
Start: 2023-01-01 | End: 2023-01-01 | Stop reason: HOSPADM

## 2023-01-01 RX ORDER — PIPERACILLIN SODIUM, TAZOBACTAM SODIUM 3; .375 G/15ML; G/15ML
3.38 INJECTION, POWDER, LYOPHILIZED, FOR SOLUTION INTRAVENOUS ONCE
Status: COMPLETED | OUTPATIENT
Start: 2023-01-01 | End: 2023-01-01

## 2023-01-01 RX ORDER — POTASSIUM CHLORIDE 1500 MG/1
20 TABLET, EXTENDED RELEASE ORAL DAILY
Qty: 30 TABLET | Refills: 0 | Status: ON HOLD | OUTPATIENT
Start: 2023-01-01 | End: 2023-01-01

## 2023-01-01 RX ORDER — PREGABALIN 150 MG/1
150 CAPSULE ORAL 2 TIMES DAILY
Qty: 60 CAPSULE | Refills: 0 | Status: SHIPPED | OUTPATIENT
Start: 2023-01-01 | End: 2024-01-01

## 2023-01-01 RX ORDER — AMPICILLIN AND SULBACTAM 2; 1 G/1; G/1
3 INJECTION, POWDER, FOR SOLUTION INTRAMUSCULAR; INTRAVENOUS EVERY 8 HOURS
Status: DISCONTINUED | OUTPATIENT
Start: 2023-01-01 | End: 2023-01-01 | Stop reason: HOSPADM

## 2023-01-01 RX ORDER — SODIUM CHLORIDE, SODIUM LACTATE, POTASSIUM CHLORIDE, CALCIUM CHLORIDE 600; 310; 30; 20 MG/100ML; MG/100ML; MG/100ML; MG/100ML
INJECTION, SOLUTION INTRAVENOUS CONTINUOUS PRN
Status: DISCONTINUED | OUTPATIENT
Start: 2023-01-01 | End: 2023-01-01

## 2023-01-01 RX ORDER — CITALOPRAM HYDROBROMIDE 40 MG/1
40 TABLET ORAL DAILY
Qty: 90 TABLET | Refills: 3 | Status: SHIPPED | OUTPATIENT
Start: 2023-01-01 | End: 2024-01-01

## 2023-01-01 RX ORDER — HEPARIN SODIUM,PORCINE 10 UNIT/ML
5-20 VIAL (ML) INTRAVENOUS DAILY PRN
Status: CANCELLED | OUTPATIENT
Start: 2023-01-01

## 2023-01-01 RX ORDER — ROSUVASTATIN CALCIUM 40 MG/1
40 TABLET, COATED ORAL DAILY
Qty: 90 TABLET | Refills: 3 | Status: SHIPPED | OUTPATIENT
Start: 2023-01-01

## 2023-01-01 RX ORDER — LEVOTHYROXINE SODIUM 50 UG/1
100 TABLET ORAL DAILY
Status: DISCONTINUED | OUTPATIENT
Start: 2023-01-01 | End: 2023-01-01 | Stop reason: HOSPADM

## 2023-01-01 RX ORDER — RAMELTEON 8 MG/1
TABLET ORAL
Qty: 30 TABLET | Refills: 3 | Status: SHIPPED | OUTPATIENT
Start: 2023-01-01 | End: 2023-01-01

## 2023-01-01 RX ORDER — POTASSIUM CHLORIDE 1500 MG/1
20 TABLET, EXTENDED RELEASE ORAL ONCE
Status: DISCONTINUED | OUTPATIENT
Start: 2023-01-01 | End: 2023-01-01

## 2023-01-01 RX ORDER — SODIUM CHLORIDE AND POTASSIUM CHLORIDE 150; 900 MG/100ML; MG/100ML
INJECTION, SOLUTION INTRAVENOUS CONTINUOUS
Status: DISCONTINUED | OUTPATIENT
Start: 2023-01-01 | End: 2023-01-01

## 2023-01-01 RX ORDER — ONDANSETRON 2 MG/ML
4 INJECTION INTRAMUSCULAR; INTRAVENOUS EVERY 6 HOURS PRN
Status: DISCONTINUED | OUTPATIENT
Start: 2023-01-01 | End: 2023-01-01 | Stop reason: HOSPADM

## 2023-01-01 RX ORDER — NALOXONE HYDROCHLORIDE 0.4 MG/ML
0.4 INJECTION, SOLUTION INTRAMUSCULAR; INTRAVENOUS; SUBCUTANEOUS
Status: DISCONTINUED | OUTPATIENT
Start: 2023-01-01 | End: 2023-01-01

## 2023-01-01 RX ORDER — AMPICILLIN AND SULBACTAM 2; 1 G/1; G/1
3 INJECTION, POWDER, FOR SOLUTION INTRAMUSCULAR; INTRAVENOUS 2 TIMES DAILY
Status: DISCONTINUED | OUTPATIENT
Start: 2023-01-01 | End: 2023-01-01 | Stop reason: HOSPADM

## 2023-01-01 RX ORDER — HYDROMORPHONE HCL IN WATER/PF 6 MG/30 ML
0.2 PATIENT CONTROLLED ANALGESIA SYRINGE INTRAVENOUS EVERY 5 MIN PRN
Status: DISCONTINUED | OUTPATIENT
Start: 2023-01-01 | End: 2023-01-01 | Stop reason: HOSPADM

## 2023-01-01 RX ORDER — METHOCARBAMOL 500 MG/1
500 TABLET, FILM COATED ORAL 4 TIMES DAILY
Status: DISCONTINUED | OUTPATIENT
Start: 2023-01-01 | End: 2023-01-01 | Stop reason: HOSPADM

## 2023-01-01 RX ORDER — LORAZEPAM 2 MG/ML
0.5 INJECTION INTRAMUSCULAR ONCE
Status: COMPLETED | OUTPATIENT
Start: 2023-01-01 | End: 2023-01-01

## 2023-01-01 RX ORDER — FENTANYL CITRATE 50 UG/ML
INJECTION, SOLUTION INTRAMUSCULAR; INTRAVENOUS PRN
Status: DISCONTINUED | OUTPATIENT
Start: 2023-01-01 | End: 2023-01-01

## 2023-01-01 RX ORDER — FLUMAZENIL 0.1 MG/ML
0.2 INJECTION, SOLUTION INTRAVENOUS
Status: ACTIVE | OUTPATIENT
Start: 2023-01-01 | End: 2023-01-01

## 2023-01-01 RX ORDER — CHOLESTYRAMINE LIGHT 4 G/5.7G
4 POWDER, FOR SUSPENSION ORAL 3 TIMES DAILY
Status: DISCONTINUED | OUTPATIENT
Start: 2023-01-01 | End: 2023-01-01 | Stop reason: HOSPADM

## 2023-01-01 RX ORDER — ZOLPIDEM TARTRATE 5 MG/1
5 TABLET ORAL
Qty: 30 TABLET | Refills: 0 | Status: SHIPPED | OUTPATIENT
Start: 2023-01-01 | End: 2023-01-01

## 2023-01-01 RX ORDER — LEVOTHYROXINE SODIUM 100 UG/1
100 TABLET ORAL DAILY
OUTPATIENT
Start: 2023-01-01

## 2023-01-01 RX ORDER — ZOLPIDEM TARTRATE 10 MG/1
10 TABLET ORAL
Qty: 30 TABLET | Refills: 0 | Status: SHIPPED | OUTPATIENT
Start: 2023-01-01 | End: 2023-01-01

## 2023-01-01 RX ORDER — HYDROMORPHONE HCL IN WATER/PF 6 MG/30 ML
0.4 PATIENT CONTROLLED ANALGESIA SYRINGE INTRAVENOUS
Status: DISCONTINUED | OUTPATIENT
Start: 2023-01-01 | End: 2023-01-01

## 2023-01-01 RX ORDER — ALBUTEROL SULFATE 0.83 MG/ML
2.5 SOLUTION RESPIRATORY (INHALATION)
Status: CANCELLED | OUTPATIENT
Start: 2023-01-01

## 2023-01-01 RX ORDER — POTASSIUM CHLORIDE 1500 MG/1
40 TABLET, EXTENDED RELEASE ORAL ONCE
Status: DISCONTINUED | OUTPATIENT
Start: 2023-01-01 | End: 2023-01-01

## 2023-01-01 RX ORDER — SINCALIDE 5 UG/5ML
0.02 INJECTION, POWDER, LYOPHILIZED, FOR SOLUTION INTRAVENOUS ONCE
Status: COMPLETED | OUTPATIENT
Start: 2023-01-01 | End: 2023-01-01

## 2023-01-01 RX ORDER — OXYCODONE HYDROCHLORIDE 5 MG/1
5 TABLET ORAL EVERY 4 HOURS PRN
Status: DISCONTINUED | OUTPATIENT
Start: 2023-01-01 | End: 2023-01-01 | Stop reason: HOSPADM

## 2023-01-01 RX ORDER — CEFTRIAXONE 2 G/1
2 INJECTION, POWDER, FOR SOLUTION INTRAMUSCULAR; INTRAVENOUS ONCE
Status: COMPLETED | OUTPATIENT
Start: 2023-01-01 | End: 2023-01-01

## 2023-01-01 RX ORDER — MEPERIDINE HYDROCHLORIDE 25 MG/ML
25 INJECTION INTRAMUSCULAR; INTRAVENOUS; SUBCUTANEOUS EVERY 30 MIN PRN
Status: CANCELLED | OUTPATIENT
Start: 2023-01-01

## 2023-01-01 RX ORDER — RAMELTEON 8 MG/1
8 TABLET ORAL AT BEDTIME
Qty: 30 TABLET | Refills: 0 | Status: SHIPPED | OUTPATIENT
Start: 2023-01-01 | End: 2023-01-01

## 2023-01-01 RX ORDER — NICOTINE POLACRILEX 4 MG
15-30 LOZENGE BUCCAL
Status: DISCONTINUED | OUTPATIENT
Start: 2023-01-01 | End: 2023-01-01 | Stop reason: HOSPADM

## 2023-01-01 RX ORDER — SALIVA STIMULANT COMB. NO.3
2 SPRAY, NON-AEROSOL (ML) MUCOUS MEMBRANE PRN
Qty: 240 ML | Refills: 0 | Status: SHIPPED | OUTPATIENT
Start: 2023-01-01 | End: 2023-01-01

## 2023-01-01 RX ORDER — FAMOTIDINE 20 MG/1
20 TABLET, FILM COATED ORAL 2 TIMES DAILY PRN
COMMUNITY

## 2023-01-01 RX ORDER — PREGABALIN 150 MG/1
CAPSULE ORAL
Qty: 90 CAPSULE | Refills: 1 | OUTPATIENT
Start: 2023-01-01

## 2023-01-01 RX ORDER — PREGABALIN 150 MG/1
150 CAPSULE ORAL 2 TIMES DAILY
Qty: 120 CAPSULE | Refills: 1 | Status: ON HOLD | OUTPATIENT
Start: 2023-01-01 | End: 2023-01-01

## 2023-01-01 RX ORDER — BUPIVACAINE HYDROCHLORIDE AND EPINEPHRINE 2.5; 5 MG/ML; UG/ML
INJECTION, SOLUTION INFILTRATION; PERINEURAL PRN
Status: DISCONTINUED | OUTPATIENT
Start: 2023-01-01 | End: 2023-01-01 | Stop reason: HOSPADM

## 2023-01-01 RX ORDER — SODIUM CHLORIDE 9 MG/ML
INJECTION, SOLUTION INTRAVENOUS CONTINUOUS
Status: DISCONTINUED | OUTPATIENT
Start: 2023-01-01 | End: 2023-01-01 | Stop reason: HOSPADM

## 2023-01-01 RX ORDER — SCOLOPAMINE TRANSDERMAL SYSTEM 1 MG/1
1 PATCH, EXTENDED RELEASE TRANSDERMAL
Qty: 10 PATCH | Refills: 1 | Status: SHIPPED | OUTPATIENT
Start: 2023-01-01 | End: 2023-01-01

## 2023-01-01 RX ORDER — METOPROLOL SUCCINATE 50 MG/1
25 TABLET, EXTENDED RELEASE ORAL DAILY
Qty: 90 TABLET | Refills: 3 | Status: ON HOLD | OUTPATIENT
Start: 2023-01-01 | End: 2024-01-01

## 2023-01-01 RX ORDER — METRONIDAZOLE 500 MG/1
500 TABLET ORAL 2 TIMES DAILY
Qty: 6 TABLET | Refills: 0 | Status: DISCONTINUED | OUTPATIENT
Start: 2023-01-01 | End: 2023-01-01 | Stop reason: HOSPADM

## 2023-01-01 RX ORDER — LIDOCAINE 40 MG/G
CREAM TOPICAL
Status: DISCONTINUED | OUTPATIENT
Start: 2023-01-01 | End: 2023-01-01

## 2023-01-01 RX ORDER — HYDROMORPHONE HYDROCHLORIDE 2 MG/1
4 TABLET ORAL EVERY 4 HOURS PRN
Status: DISCONTINUED | OUTPATIENT
Start: 2023-01-01 | End: 2023-01-01 | Stop reason: HOSPADM

## 2023-01-01 RX ORDER — PANTOPRAZOLE SODIUM 40 MG/1
40 TABLET, DELAYED RELEASE ORAL
Status: DISCONTINUED | OUTPATIENT
Start: 2023-01-01 | End: 2023-01-01 | Stop reason: HOSPADM

## 2023-01-01 RX ORDER — AMOXICILLIN 250 MG
2 CAPSULE ORAL 2 TIMES DAILY PRN
Status: DISCONTINUED | OUTPATIENT
Start: 2023-01-01 | End: 2023-01-01

## 2023-01-01 RX ORDER — BARIUM SULFATE 400 MG/ML
SUSPENSION ORAL ONCE
Status: COMPLETED | OUTPATIENT
Start: 2023-01-01 | End: 2023-01-01

## 2023-01-01 RX ORDER — ACETAMINOPHEN 650 MG/1
650 SUPPOSITORY RECTAL EVERY 6 HOURS PRN
Status: DISCONTINUED | OUTPATIENT
Start: 2023-01-01 | End: 2023-01-01 | Stop reason: HOSPADM

## 2023-01-01 RX ORDER — ONDANSETRON 4 MG/1
4 TABLET, ORALLY DISINTEGRATING ORAL EVERY 8 HOURS PRN
Qty: 10 TABLET | Refills: 0 | Status: SHIPPED | OUTPATIENT
Start: 2023-01-01 | End: 2024-01-01

## 2023-01-01 RX ORDER — ONDANSETRON 4 MG/1
TABLET, ORALLY DISINTEGRATING ORAL
Qty: 10 TABLET | Refills: 0 | Status: SHIPPED | OUTPATIENT
Start: 2023-01-01 | End: 2023-01-01

## 2023-01-01 RX ORDER — FENTANYL CITRATE 0.05 MG/ML
50 INJECTION, SOLUTION INTRAMUSCULAR; INTRAVENOUS EVERY 5 MIN PRN
Status: DISCONTINUED | OUTPATIENT
Start: 2023-01-01 | End: 2023-01-01 | Stop reason: HOSPADM

## 2023-01-01 RX ORDER — HYDROMORPHONE HYDROCHLORIDE 1 MG/ML
0.5 INJECTION, SOLUTION INTRAMUSCULAR; INTRAVENOUS; SUBCUTANEOUS EVERY 30 MIN PRN
Status: DISCONTINUED | OUTPATIENT
Start: 2023-01-01 | End: 2023-01-01 | Stop reason: HOSPADM

## 2023-01-01 RX ORDER — LEVOTHYROXINE SODIUM 100 UG/1
100 TABLET ORAL DAILY
Qty: 90 TABLET | Refills: 3 | Status: SHIPPED | OUTPATIENT
Start: 2023-01-01 | End: 2024-01-01

## 2023-01-01 RX ORDER — ALBUTEROL SULFATE 90 UG/1
1-2 AEROSOL, METERED RESPIRATORY (INHALATION)
Status: CANCELLED
Start: 2023-01-01

## 2023-01-01 RX ORDER — PANTOPRAZOLE SODIUM 40 MG/1
40 TABLET, DELAYED RELEASE ORAL 2 TIMES DAILY PRN
Status: DISCONTINUED | OUTPATIENT
Start: 2023-01-01 | End: 2023-01-01 | Stop reason: HOSPADM

## 2023-01-01 RX ORDER — AMPICILLIN AND SULBACTAM 2; 1 G/1; G/1
3 INJECTION, POWDER, FOR SOLUTION INTRAMUSCULAR; INTRAVENOUS EVERY 12 HOURS
Status: DISCONTINUED | OUTPATIENT
Start: 2023-01-01 | End: 2023-01-01

## 2023-01-01 RX ORDER — CETIRIZINE HYDROCHLORIDE 10 MG/1
10 TABLET ORAL DAILY
Qty: 30 TABLET | Refills: 1 | Status: SHIPPED | OUTPATIENT
Start: 2023-01-01

## 2023-01-01 RX ORDER — POTASSIUM CHLORIDE 1.5 G/1.58G
40 POWDER, FOR SOLUTION ORAL 3 TIMES DAILY
Status: DISCONTINUED | OUTPATIENT
Start: 2023-01-01 | End: 2023-01-01 | Stop reason: HOSPADM

## 2023-01-01 RX ORDER — POTASSIUM CHLORIDE 20MEQ/15ML
40 LIQUID (ML) ORAL ONCE
Status: COMPLETED | OUTPATIENT
Start: 2023-01-01 | End: 2023-01-01

## 2023-01-01 RX ORDER — SODIUM BICARBONATE 650 MG/1
650 TABLET ORAL 4 TIMES DAILY
Qty: 120 TABLET | Refills: 0 | Status: SHIPPED | OUTPATIENT
Start: 2023-01-01 | End: 2023-01-01

## 2023-01-01 RX ORDER — RESPIRATORY SYNCYTIAL VIRUS VACCINE 120MCG/0.5
0.5 KIT INTRAMUSCULAR ONCE
Qty: 1 EACH | Refills: 0 | Status: CANCELLED | OUTPATIENT
Start: 2023-01-01 | End: 2023-01-01

## 2023-01-01 RX ORDER — SENNOSIDES 8.6 MG
8.6 TABLET ORAL 2 TIMES DAILY PRN
Status: DISCONTINUED | OUTPATIENT
Start: 2023-01-01 | End: 2023-01-01 | Stop reason: HOSPADM

## 2023-01-01 RX ORDER — ONDANSETRON 4 MG/1
4 TABLET, ORALLY DISINTEGRATING ORAL EVERY 30 MIN PRN
Status: DISCONTINUED | OUTPATIENT
Start: 2023-01-01 | End: 2023-01-01 | Stop reason: HOSPADM

## 2023-01-01 RX ORDER — POTASSIUM CHLORIDE 20MEQ/15ML
10 LIQUID (ML) ORAL 2 TIMES DAILY
Status: DISCONTINUED | OUTPATIENT
Start: 2023-01-01 | End: 2023-01-01

## 2023-01-01 RX ORDER — DEXTROSE MONOHYDRATE, SODIUM CHLORIDE, AND POTASSIUM CHLORIDE 50; 1.49; 4.5 G/1000ML; G/1000ML; G/1000ML
INJECTION, SOLUTION INTRAVENOUS CONTINUOUS
Status: DISCONTINUED | OUTPATIENT
Start: 2023-01-01 | End: 2023-01-01

## 2023-01-01 RX ORDER — SODIUM CHLORIDE, SODIUM LACTATE, POTASSIUM CHLORIDE, CALCIUM CHLORIDE 600; 310; 30; 20 MG/100ML; MG/100ML; MG/100ML; MG/100ML
INJECTION, SOLUTION INTRAVENOUS CONTINUOUS
Status: DISCONTINUED | OUTPATIENT
Start: 2023-01-01 | End: 2023-01-01

## 2023-01-01 RX ORDER — PIPERACILLIN SODIUM, TAZOBACTAM SODIUM 3; .375 G/15ML; G/15ML
3.38 INJECTION, POWDER, LYOPHILIZED, FOR SOLUTION INTRAVENOUS EVERY 6 HOURS
Status: DISCONTINUED | OUTPATIENT
Start: 2023-01-01 | End: 2023-01-01

## 2023-01-01 RX ORDER — CHOLESTYRAMINE 4 G/9G
1 POWDER, FOR SUSPENSION ORAL 2 TIMES DAILY
Status: DISCONTINUED | OUTPATIENT
Start: 2023-01-01 | End: 2023-01-01

## 2023-01-01 RX ORDER — POTASSIUM CHLORIDE 1500 MG/1
40 TABLET, EXTENDED RELEASE ORAL 2 TIMES DAILY
Status: DISCONTINUED | OUTPATIENT
Start: 2023-01-01 | End: 2023-01-01

## 2023-01-01 RX ORDER — CIPROFLOXACIN 500 MG/1
500 TABLET, FILM COATED ORAL EVERY 12 HOURS SCHEDULED
Qty: 6 TABLET | Refills: 0 | Status: DISCONTINUED | OUTPATIENT
Start: 2023-01-01 | End: 2023-01-01 | Stop reason: HOSPADM

## 2023-01-01 RX ORDER — OMEPRAZOLE 40 MG/1
40 CAPSULE, DELAYED RELEASE ORAL DAILY PRN
Status: CANCELLED
Start: 2023-01-01

## 2023-01-01 RX ORDER — ZOLPIDEM TARTRATE 10 MG/1
10 TABLET ORAL
Qty: 30 TABLET | Refills: 0 | Status: SHIPPED | OUTPATIENT
Start: 2023-01-01 | End: 2024-01-01

## 2023-01-01 RX ORDER — SALIVA STIMULANT COMB. NO.3
2 SPRAY, NON-AEROSOL (ML) MUCOUS MEMBRANE 4 TIMES DAILY
Status: DISCONTINUED | OUTPATIENT
Start: 2023-01-01 | End: 2023-01-01 | Stop reason: HOSPADM

## 2023-01-01 RX ORDER — CHOLESTYRAMINE LIGHT 4 G/5.7G
4 POWDER, FOR SUSPENSION ORAL 2 TIMES DAILY
Status: DISCONTINUED | OUTPATIENT
Start: 2023-01-01 | End: 2023-01-01

## 2023-01-01 RX ORDER — SODIUM BICARBONATE 650 MG/1
1300 TABLET ORAL 3 TIMES DAILY
Status: DISCONTINUED | OUTPATIENT
Start: 2023-01-01 | End: 2023-01-01

## 2023-01-01 RX ORDER — ASPIRIN 325 MG
325 TABLET ORAL DAILY
Status: ON HOLD | COMMUNITY
End: 2023-01-01

## 2023-01-01 RX ORDER — SENNOSIDES 8.6 MG
2 TABLET ORAL 2 TIMES DAILY
Status: DISCONTINUED | OUTPATIENT
Start: 2023-01-01 | End: 2023-01-01

## 2023-01-01 RX ORDER — ONDANSETRON 2 MG/ML
4 INJECTION INTRAMUSCULAR; INTRAVENOUS EVERY 6 HOURS PRN
Status: DISCONTINUED | OUTPATIENT
Start: 2023-01-01 | End: 2023-01-01

## 2023-01-01 RX ORDER — CIPROFLOXACIN 500 MG/1
500 TABLET, FILM COATED ORAL EVERY 12 HOURS SCHEDULED
Status: DISCONTINUED | OUTPATIENT
Start: 2023-01-01 | End: 2023-01-01

## 2023-01-01 RX ORDER — DULOXETIN HYDROCHLORIDE 30 MG/1
60 CAPSULE, DELAYED RELEASE ORAL DAILY
Status: DISCONTINUED | OUTPATIENT
Start: 2023-01-01 | End: 2023-01-01 | Stop reason: HOSPADM

## 2023-01-01 RX ORDER — RAMELTEON 8 MG/1
TABLET ORAL
Qty: 15 TABLET | Refills: 0 | Status: SHIPPED | OUTPATIENT
Start: 2023-01-01 | End: 2023-01-01

## 2023-01-01 RX ORDER — CEFTRIAXONE 2 G/1
2 INJECTION, POWDER, FOR SOLUTION INTRAMUSCULAR; INTRAVENOUS ONCE
Status: CANCELLED
Start: 2023-01-01 | End: 2023-01-01

## 2023-01-01 RX ORDER — ASPIRIN 81 MG/1
81 TABLET ORAL DAILY
Status: ON HOLD | COMMUNITY
End: 2024-01-01

## 2023-01-01 RX ORDER — POTASSIUM CHLORIDE 1500 MG/1
20 TABLET, EXTENDED RELEASE ORAL 2 TIMES DAILY
Qty: 28 TABLET | Refills: 0 | Status: SHIPPED | OUTPATIENT
Start: 2023-01-01 | End: 2023-01-01

## 2023-01-01 RX ORDER — BUPROPION HYDROCHLORIDE 75 MG/1
TABLET ORAL
COMMUNITY
Start: 2023-01-11 | End: 2023-01-01

## 2023-01-01 RX ORDER — OXYCODONE HYDROCHLORIDE 5 MG/1
TABLET ORAL
Qty: 120 TABLET | Refills: 0 | Status: SHIPPED | OUTPATIENT
Start: 2023-01-01 | End: 2024-01-01

## 2023-01-01 RX ORDER — FAMOTIDINE 20 MG/1
20 TABLET, FILM COATED ORAL 2 TIMES DAILY PRN
Status: DISCONTINUED | OUTPATIENT
Start: 2023-01-01 | End: 2023-01-01 | Stop reason: HOSPADM

## 2023-01-01 RX ORDER — OXYCODONE HYDROCHLORIDE 5 MG/1
TABLET ORAL
Qty: 120 TABLET | Refills: 0 | Status: ON HOLD | OUTPATIENT
Start: 2023-01-01 | End: 2023-01-01

## 2023-01-01 RX ORDER — CIPROFLOXACIN 500 MG/1
500 TABLET, FILM COATED ORAL EVERY 12 HOURS
Qty: 6 TABLET | Refills: 0 | Status: SHIPPED | OUTPATIENT
Start: 2023-01-01 | End: 2023-01-01

## 2023-01-01 RX ORDER — LIDOCAINE 40 MG/G
CREAM TOPICAL
Status: DISCONTINUED | OUTPATIENT
Start: 2023-01-01 | End: 2023-01-01 | Stop reason: HOSPADM

## 2023-01-01 RX ORDER — LOPERAMIDE HCL 2 MG
2 CAPSULE ORAL 2 TIMES DAILY
Status: DISCONTINUED | OUTPATIENT
Start: 2023-01-01 | End: 2023-01-01 | Stop reason: HOSPADM

## 2023-01-01 RX ORDER — MIRTAZAPINE 15 MG/1
15 TABLET, FILM COATED ORAL AT BEDTIME
Status: DISCONTINUED | OUTPATIENT
Start: 2023-01-01 | End: 2023-01-01

## 2023-01-01 RX ORDER — KIT FOR THE PREPARATION OF TECHNETIUM TC 99M MEBROFENIN 45 MG/10ML
5 INJECTION, POWDER, LYOPHILIZED, FOR SOLUTION INTRAVENOUS ONCE
Status: COMPLETED | OUTPATIENT
Start: 2023-01-01 | End: 2023-01-01

## 2023-01-01 RX ORDER — SODIUM BICARBONATE 650 MG/1
1300 TABLET ORAL 4 TIMES DAILY
Status: DISCONTINUED | OUTPATIENT
Start: 2023-01-01 | End: 2023-01-01 | Stop reason: HOSPADM

## 2023-01-01 RX ORDER — HYDROMORPHONE HCL IN WATER/PF 6 MG/30 ML
0.2 PATIENT CONTROLLED ANALGESIA SYRINGE INTRAVENOUS
Status: DISCONTINUED | OUTPATIENT
Start: 2023-01-01 | End: 2023-01-01 | Stop reason: HOSPADM

## 2023-01-01 RX ORDER — LEVOTHYROXINE SODIUM 137 UG/1
TABLET ORAL
COMMUNITY
Start: 2023-01-01 | End: 2023-01-01

## 2023-01-01 RX ORDER — HYDROMORPHONE HYDROCHLORIDE 2 MG/1
2 TABLET ORAL EVERY 4 HOURS PRN
Status: DISCONTINUED | OUTPATIENT
Start: 2023-01-01 | End: 2023-01-01 | Stop reason: HOSPADM

## 2023-01-01 RX ORDER — PREGABALIN 75 MG/1
150 CAPSULE ORAL 2 TIMES DAILY
Status: DISCONTINUED | OUTPATIENT
Start: 2023-01-01 | End: 2023-01-01

## 2023-01-01 RX ORDER — PREGABALIN 150 MG/1
150 CAPSULE ORAL DAILY
Qty: 120 CAPSULE | Refills: 1
Start: 2023-01-01 | End: 2023-01-01

## 2023-01-01 RX ORDER — PILOCARPINE HYDROCHLORIDE 5 MG/1
5 TABLET, FILM COATED ORAL 3 TIMES DAILY
Qty: 90 TABLET | Refills: 3 | Status: SHIPPED | OUTPATIENT
Start: 2023-01-01 | End: 2023-01-01

## 2023-01-01 RX ORDER — FENTANYL CITRATE 50 UG/ML
50 INJECTION, SOLUTION INTRAMUSCULAR; INTRAVENOUS EVERY 5 MIN PRN
Status: DISCONTINUED | OUTPATIENT
Start: 2023-01-01 | End: 2023-01-01 | Stop reason: HOSPADM

## 2023-01-01 RX ORDER — PREGABALIN 75 MG/1
75 CAPSULE ORAL DAILY
Status: DISCONTINUED | OUTPATIENT
Start: 2023-01-01 | End: 2023-01-01 | Stop reason: HOSPADM

## 2023-01-01 RX ORDER — OXYCODONE HYDROCHLORIDE 5 MG/1
5 TABLET ORAL EVERY 6 HOURS PRN
Qty: 8 TABLET | Refills: 0 | Status: SHIPPED | OUTPATIENT
Start: 2023-01-01 | End: 2023-01-01

## 2023-01-01 RX ORDER — LEVOTHYROXINE SODIUM 100 UG/1
100 TABLET ORAL DAILY
COMMUNITY
Start: 2023-01-01 | End: 2023-01-01

## 2023-01-01 RX ORDER — AMOXICILLIN 250 MG
1 CAPSULE ORAL 2 TIMES DAILY PRN
Qty: 10 TABLET | Refills: 0 | Status: SHIPPED | OUTPATIENT
Start: 2023-01-01 | End: 2023-01-01

## 2023-01-01 RX ADMIN — LEVOTHYROXINE SODIUM 100 MCG: 100 TABLET ORAL at 09:24

## 2023-01-01 RX ADMIN — Medication 1 MG: at 21:14

## 2023-01-01 RX ADMIN — POTASSIUM CHLORIDE 40 MEQ: 1.5 POWDER, FOR SOLUTION ORAL at 23:16

## 2023-01-01 RX ADMIN — DULOXETINE HYDROCHLORIDE 60 MG: 30 CAPSULE, DELAYED RELEASE PELLETS ORAL at 08:09

## 2023-01-01 RX ADMIN — SCOPALAMINE 1 PATCH: 1 PATCH, EXTENDED RELEASE TRANSDERMAL at 09:24

## 2023-01-01 RX ADMIN — FENTANYL CITRATE 25 MCG: 50 INJECTION, SOLUTION INTRAMUSCULAR; INTRAVENOUS at 11:26

## 2023-01-01 RX ADMIN — PREGABALIN 75 MG: 75 CAPSULE ORAL at 09:27

## 2023-01-01 RX ADMIN — POTASSIUM CHLORIDE: 2 INJECTION, SOLUTION, CONCENTRATE INTRAVENOUS at 03:23

## 2023-01-01 RX ADMIN — Medication 2 SPRAY: at 08:18

## 2023-01-01 RX ADMIN — HYDROMORPHONE HYDROCHLORIDE 0.2 MG: 0.2 INJECTION, SOLUTION INTRAMUSCULAR; INTRAVENOUS; SUBCUTANEOUS at 20:59

## 2023-01-01 RX ADMIN — DULOXETINE HYDROCHLORIDE 60 MG: 60 CAPSULE, DELAYED RELEASE ORAL at 08:30

## 2023-01-01 RX ADMIN — HYDROMORPHONE HYDROCHLORIDE 2 MG: 2 TABLET ORAL at 09:34

## 2023-01-01 RX ADMIN — AMPICILLIN SODIUM AND SULBACTAM SODIUM 3 G: 2; 1 INJECTION, POWDER, FOR SOLUTION INTRAMUSCULAR; INTRAVENOUS at 05:14

## 2023-01-01 RX ADMIN — POTASSIUM CHLORIDE 40 MEQ: 1.5 FOR SOLUTION ORAL at 13:54

## 2023-01-01 RX ADMIN — DULOXETINE HYDROCHLORIDE 60 MG: 30 CAPSULE, DELAYED RELEASE PELLETS ORAL at 09:24

## 2023-01-01 RX ADMIN — PROPOFOL 190 MG: 10 INJECTION, EMULSION INTRAVENOUS at 09:38

## 2023-01-01 RX ADMIN — POTASSIUM CHLORIDE 40 MEQ: 1.5 SOLUTION ORAL at 16:44

## 2023-01-01 RX ADMIN — AMPICILLIN SODIUM AND SULBACTAM SODIUM 3 G: 2; 1 INJECTION, POWDER, FOR SOLUTION INTRAMUSCULAR; INTRAVENOUS at 05:17

## 2023-01-01 RX ADMIN — POTASSIUM CHLORIDE 40 MEQ: 1500 TABLET, EXTENDED RELEASE ORAL at 22:41

## 2023-01-01 RX ADMIN — POTASSIUM CHLORIDE 40 MEQ: 1500 TABLET, EXTENDED RELEASE ORAL at 13:52

## 2023-01-01 RX ADMIN — PROPOFOL 30 MCG/KG/MIN: 10 INJECTION, EMULSION INTRAVENOUS at 10:00

## 2023-01-01 RX ADMIN — CITALOPRAM HYDROBROMIDE 40 MG: 20 TABLET ORAL at 08:30

## 2023-01-01 RX ADMIN — Medication 2 SPRAY: at 21:07

## 2023-01-01 RX ADMIN — LEVOTHYROXINE SODIUM 100 MCG: 100 TABLET ORAL at 08:30

## 2023-01-01 RX ADMIN — Medication 10 MG: at 09:51

## 2023-01-01 RX ADMIN — POTASSIUM CHLORIDE 40 MEQ: 1500 TABLET, EXTENDED RELEASE ORAL at 17:56

## 2023-01-01 RX ADMIN — POTASSIUM CHLORIDE 40 MEQ: 1500 TABLET, EXTENDED RELEASE ORAL at 22:50

## 2023-01-01 RX ADMIN — PIPERACILLIN AND TAZOBACTAM 3.38 G: 3; .375 INJECTION, POWDER, FOR SOLUTION INTRAVENOUS at 09:59

## 2023-01-01 RX ADMIN — AMPICILLIN SODIUM AND SULBACTAM SODIUM 3 G: 2; 1 INJECTION, POWDER, FOR SOLUTION INTRAMUSCULAR; INTRAVENOUS at 15:08

## 2023-01-01 RX ADMIN — Medication 2 SPRAY: at 16:09

## 2023-01-01 RX ADMIN — POTASSIUM CHLORIDE, DEXTROSE MONOHYDRATE AND SODIUM CHLORIDE: 150; 5; 450 INJECTION, SOLUTION INTRAVENOUS at 19:40

## 2023-01-01 RX ADMIN — LEVOTHYROXINE SODIUM 100 MCG: 100 TABLET ORAL at 09:28

## 2023-01-01 RX ADMIN — OXYCODONE HYDROCHLORIDE 5 MG: 5 TABLET ORAL at 14:48

## 2023-01-01 RX ADMIN — CHOLESTYRAMINE 4 G: 4 POWDER, FOR SUSPENSION ORAL at 20:04

## 2023-01-01 RX ADMIN — OXYCODONE HYDROCHLORIDE 10 MG: 5 TABLET ORAL at 10:36

## 2023-01-01 RX ADMIN — Medication 2 SPRAY: at 16:07

## 2023-01-01 RX ADMIN — PIPERACILLIN AND TAZOBACTAM 3.38 G: 3; .375 INJECTION, POWDER, FOR SOLUTION INTRAVENOUS at 20:28

## 2023-01-01 RX ADMIN — HYDROMORPHONE HYDROCHLORIDE 2 MG: 2 TABLET ORAL at 23:37

## 2023-01-01 RX ADMIN — Medication 2 SPRAY: at 15:38

## 2023-01-01 RX ADMIN — FENTANYL CITRATE 50 MCG: 50 INJECTION INTRAMUSCULAR; INTRAVENOUS at 13:41

## 2023-01-01 RX ADMIN — PREGABALIN 150 MG: 150 CAPSULE ORAL at 08:31

## 2023-01-01 RX ADMIN — SODIUM CHLORIDE 250 ML: 9 INJECTION, SOLUTION INTRAVENOUS at 08:23

## 2023-01-01 RX ADMIN — OXYCODONE HYDROCHLORIDE 5 MG: 5 TABLET ORAL at 04:57

## 2023-01-01 RX ADMIN — SODIUM CHLORIDE 1000 ML: 9 INJECTION, SOLUTION INTRAVENOUS at 12:08

## 2023-01-01 RX ADMIN — DEXTROSE MONOHYDRATE 25 ML: 25 INJECTION, SOLUTION INTRAVENOUS at 21:50

## 2023-01-01 RX ADMIN — POTASSIUM CHLORIDE 20 MEQ: 1500 TABLET, EXTENDED RELEASE ORAL at 15:59

## 2023-01-01 RX ADMIN — CITALOPRAM HYDROBROMIDE 40 MG: 20 TABLET ORAL at 09:27

## 2023-01-01 RX ADMIN — POTASSIUM CHLORIDE 10 MEQ: 10 INJECTION, SOLUTION INTRAVENOUS at 01:22

## 2023-01-01 RX ADMIN — Medication 2 SPRAY: at 13:20

## 2023-01-01 RX ADMIN — ENOXAPARIN SODIUM 40 MG: 40 INJECTION SUBCUTANEOUS at 08:19

## 2023-01-01 RX ADMIN — ROSUVASTATIN CALCIUM 40 MG: 20 TABLET, FILM COATED ORAL at 09:24

## 2023-01-01 RX ADMIN — HYDROMORPHONE HYDROCHLORIDE 0.4 MG: 0.2 INJECTION, SOLUTION INTRAMUSCULAR; INTRAVENOUS; SUBCUTANEOUS at 12:55

## 2023-01-01 RX ADMIN — METOPROLOL SUCCINATE 25 MG: 25 TABLET, EXTENDED RELEASE ORAL at 10:10

## 2023-01-01 RX ADMIN — CITALOPRAM HYDROBROMIDE 40 MG: 20 TABLET ORAL at 08:09

## 2023-01-01 RX ADMIN — ROSUVASTATIN CALCIUM 40 MG: 20 TABLET, FILM COATED ORAL at 08:30

## 2023-01-01 RX ADMIN — POTASSIUM CHLORIDE 20 MEQ: 1500 TABLET, EXTENDED RELEASE ORAL at 09:41

## 2023-01-01 RX ADMIN — Medication 2 SPRAY: at 17:07

## 2023-01-01 RX ADMIN — OXYCODONE HYDROCHLORIDE 5 MG: 5 TABLET ORAL at 05:01

## 2023-01-01 RX ADMIN — Medication 2 SPRAY: at 20:47

## 2023-01-01 RX ADMIN — PIPERACILLIN AND TAZOBACTAM 3.38 G: 3; .375 INJECTION, POWDER, FOR SOLUTION INTRAVENOUS at 01:48

## 2023-01-01 RX ADMIN — OXYCODONE HYDROCHLORIDE 5 MG: 5 TABLET ORAL at 20:42

## 2023-01-01 RX ADMIN — CHOLESTYRAMINE 4 G: 4 POWDER, FOR SUSPENSION ORAL at 13:19

## 2023-01-01 RX ADMIN — POTASSIUM CHLORIDE 10 MEQ: 7.46 INJECTION, SOLUTION INTRAVENOUS at 10:30

## 2023-01-01 RX ADMIN — POTASSIUM & SODIUM PHOSPHATES POWDER PACK 280-160-250 MG 1 PACKET: 280-160-250 PACK at 08:40

## 2023-01-01 RX ADMIN — PIPERACILLIN AND TAZOBACTAM 3.38 G: 3; .375 INJECTION, POWDER, FOR SOLUTION INTRAVENOUS at 20:34

## 2023-01-01 RX ADMIN — ONDANSETRON 4 MG: 4 TABLET, ORALLY DISINTEGRATING ORAL at 07:45

## 2023-01-01 RX ADMIN — PANTOPRAZOLE SODIUM 40 MG: 40 TABLET, DELAYED RELEASE ORAL at 06:08

## 2023-01-01 RX ADMIN — METHOCARBAMOL 500 MG: 500 TABLET ORAL at 11:30

## 2023-01-01 RX ADMIN — METOPROLOL SUCCINATE 25 MG: 25 TABLET, EXTENDED RELEASE ORAL at 09:30

## 2023-01-01 RX ADMIN — PREGABALIN 150 MG: 100 CAPSULE ORAL at 08:15

## 2023-01-01 RX ADMIN — ONDANSETRON 4 MG: 4 TABLET, ORALLY DISINTEGRATING ORAL at 13:50

## 2023-01-01 RX ADMIN — OXYCODONE HYDROCHLORIDE 5 MG: 5 TABLET ORAL at 09:59

## 2023-01-01 RX ADMIN — POTASSIUM CHLORIDE 10 MEQ: 10 INJECTION, SOLUTION INTRAVENOUS at 00:14

## 2023-01-01 RX ADMIN — ZOLPIDEM TARTRATE 5 MG: 5 TABLET ORAL at 20:46

## 2023-01-01 RX ADMIN — AMPICILLIN SODIUM AND SULBACTAM SODIUM 3 G: 2; 1 INJECTION, POWDER, FOR SOLUTION INTRAMUSCULAR; INTRAVENOUS at 23:01

## 2023-01-01 RX ADMIN — DULOXETINE HYDROCHLORIDE 60 MG: 30 CAPSULE, DELAYED RELEASE PELLETS ORAL at 08:16

## 2023-01-01 RX ADMIN — SENNOSIDES 2 TABLET: 8.6 TABLET, FILM COATED ORAL at 20:20

## 2023-01-01 RX ADMIN — SODIUM CHLORIDE, POTASSIUM CHLORIDE, SODIUM LACTATE AND CALCIUM CHLORIDE: 600; 310; 30; 20 INJECTION, SOLUTION INTRAVENOUS at 11:34

## 2023-01-01 RX ADMIN — PREGABALIN 150 MG: 100 CAPSULE ORAL at 08:53

## 2023-01-01 RX ADMIN — SUCCINYLCHOLINE CHLORIDE 100 MG: 20 INJECTION, SOLUTION INTRAMUSCULAR; INTRAVENOUS; PARENTERAL at 13:32

## 2023-01-01 RX ADMIN — Medication 2 SPRAY: at 08:37

## 2023-01-01 RX ADMIN — ROSUVASTATIN CALCIUM 40 MG: 20 TABLET, FILM COATED ORAL at 08:14

## 2023-01-01 RX ADMIN — SODIUM CHLORIDE 60 ML: 9 INJECTION, SOLUTION INTRAVENOUS at 12:58

## 2023-01-01 RX ADMIN — POTASSIUM CHLORIDE 40 MEQ: 1.5 POWDER, FOR SOLUTION ORAL at 17:41

## 2023-01-01 RX ADMIN — SODIUM CHLORIDE AND POTASSIUM CHLORIDE: 150; 900 INJECTION, SOLUTION INTRAVENOUS at 22:46

## 2023-01-01 RX ADMIN — PREGABALIN 150 MG: 100 CAPSULE ORAL at 09:26

## 2023-01-01 RX ADMIN — OXYCODONE HYDROCHLORIDE 5 MG: 5 TABLET ORAL at 08:40

## 2023-01-01 RX ADMIN — ONDANSETRON 4 MG: 2 INJECTION INTRAMUSCULAR; INTRAVENOUS at 13:36

## 2023-01-01 RX ADMIN — ONDANSETRON 4 MG: 2 INJECTION INTRAMUSCULAR; INTRAVENOUS at 21:58

## 2023-01-01 RX ADMIN — CETIRIZINE HYDROCHLORIDE 10 MG: 10 TABLET, FILM COATED ORAL at 10:10

## 2023-01-01 RX ADMIN — FAMOTIDINE 20 MG: 20 TABLET ORAL at 17:59

## 2023-01-01 RX ADMIN — HYDROMORPHONE HYDROCHLORIDE 2 MG: 2 TABLET ORAL at 05:34

## 2023-01-01 RX ADMIN — POTASSIUM CHLORIDE 40 MEQ: 1500 TABLET, EXTENDED RELEASE ORAL at 09:24

## 2023-01-01 RX ADMIN — POTASSIUM CHLORIDE 10 MEQ: 7.46 INJECTION, SOLUTION INTRAVENOUS at 11:33

## 2023-01-01 RX ADMIN — CEFTRIAXONE SODIUM 2 G: 2 INJECTION, POWDER, FOR SOLUTION INTRAMUSCULAR; INTRAVENOUS at 08:30

## 2023-01-01 RX ADMIN — Medication 1 MG: at 20:46

## 2023-01-01 RX ADMIN — POTASSIUM CHLORIDE 10 MEQ: 7.46 INJECTION, SOLUTION INTRAVENOUS at 09:20

## 2023-01-01 RX ADMIN — SODIUM BICARBONATE: 84 INJECTION, SOLUTION INTRAVENOUS at 16:56

## 2023-01-01 RX ADMIN — PIPERACILLIN AND TAZOBACTAM 3.38 G: 3; .375 INJECTION, POWDER, FOR SOLUTION INTRAVENOUS at 20:09

## 2023-01-01 RX ADMIN — DULOXETINE HYDROCHLORIDE 60 MG: 30 CAPSULE, DELAYED RELEASE PELLETS ORAL at 08:54

## 2023-01-01 RX ADMIN — ALTEPLASE 2 MG: 2.2 INJECTION, POWDER, LYOPHILIZED, FOR SOLUTION INTRAVENOUS at 11:21

## 2023-01-01 RX ADMIN — SODIUM BICARBONATE: 84 INJECTION, SOLUTION INTRAVENOUS at 11:27

## 2023-01-01 RX ADMIN — POTASSIUM CHLORIDE 10 MEQ: 7.46 INJECTION, SOLUTION INTRAVENOUS at 01:10

## 2023-01-01 RX ADMIN — HYDROMORPHONE HYDROCHLORIDE 0.2 MG: 0.2 INJECTION, SOLUTION INTRAMUSCULAR; INTRAVENOUS; SUBCUTANEOUS at 00:26

## 2023-01-01 RX ADMIN — LOPERAMIDE HYDROCHLORIDE 2 MG: 2 CAPSULE ORAL at 20:10

## 2023-01-01 RX ADMIN — LIDOCAINE HYDROCHLORIDE 100 MG: 20 INJECTION, SOLUTION INFILTRATION; PERINEURAL at 13:32

## 2023-01-01 RX ADMIN — ROSUVASTATIN CALCIUM 40 MG: 20 TABLET, FILM COATED ORAL at 08:40

## 2023-01-01 RX ADMIN — OXYCODONE HYDROCHLORIDE 10 MG: 5 TABLET ORAL at 03:27

## 2023-01-01 RX ADMIN — SODIUM BICARBONATE 650 MG TABLET 1300 MG: at 12:38

## 2023-01-01 RX ADMIN — HYDROMORPHONE HYDROCHLORIDE 0.5 MG: 1 INJECTION, SOLUTION INTRAMUSCULAR; INTRAVENOUS; SUBCUTANEOUS at 15:20

## 2023-01-01 RX ADMIN — PIPERACILLIN AND TAZOBACTAM 3.38 G: 3; .375 INJECTION, POWDER, FOR SOLUTION INTRAVENOUS at 02:48

## 2023-01-01 RX ADMIN — POTASSIUM CHLORIDE 10 MEQ: 10 INJECTION, SOLUTION INTRAVENOUS at 13:27

## 2023-01-01 RX ADMIN — POTASSIUM CHLORIDE 10 MEQ: 7.46 INJECTION, SOLUTION INTRAVENOUS at 04:20

## 2023-01-01 RX ADMIN — IOPAMIDOL 120 ML: 755 INJECTION, SOLUTION INTRAVENOUS at 15:41

## 2023-01-01 RX ADMIN — POTASSIUM CHLORIDE 10 MEQ: 7.46 INJECTION, SOLUTION INTRAVENOUS at 08:06

## 2023-01-01 RX ADMIN — POTASSIUM CHLORIDE 10 MEQ: 7.46 INJECTION, SOLUTION INTRAVENOUS at 01:17

## 2023-01-01 RX ADMIN — POTASSIUM CHLORIDE 10 MEQ: 7.46 INJECTION, SOLUTION INTRAVENOUS at 03:02

## 2023-01-01 RX ADMIN — POTASSIUM CHLORIDE 10 MEQ: 1.5 SOLUTION ORAL at 20:04

## 2023-01-01 RX ADMIN — SODIUM BICARBONATE 650 MG TABLET 1300 MG: at 08:17

## 2023-01-01 RX ADMIN — LOPERAMIDE HYDROCHLORIDE 2 MG: 2 CAPSULE ORAL at 09:26

## 2023-01-01 RX ADMIN — HYDROMORPHONE HYDROCHLORIDE 0.5 MG: 1 INJECTION, SOLUTION INTRAMUSCULAR; INTRAVENOUS; SUBCUTANEOUS at 13:45

## 2023-01-01 RX ADMIN — LEVOTHYROXINE SODIUM 100 MCG: 100 TABLET ORAL at 08:09

## 2023-01-01 RX ADMIN — OXYCODONE HYDROCHLORIDE 10 MG: 5 TABLET ORAL at 20:46

## 2023-01-01 RX ADMIN — POTASSIUM CHLORIDE 10 MEQ: 10 INJECTION, SOLUTION INTRAVENOUS at 03:09

## 2023-01-01 RX ADMIN — ONDANSETRON 4 MG: 2 INJECTION INTRAMUSCULAR; INTRAVENOUS at 08:48

## 2023-01-01 RX ADMIN — SODIUM CHLORIDE 1000 ML: 9 INJECTION, SOLUTION INTRAVENOUS at 21:56

## 2023-01-01 RX ADMIN — ONDANSETRON 4 MG: 2 INJECTION INTRAMUSCULAR; INTRAVENOUS at 08:15

## 2023-01-01 RX ADMIN — METHOCARBAMOL 500 MG: 500 TABLET ORAL at 14:21

## 2023-01-01 RX ADMIN — OXYCODONE HYDROCHLORIDE 5 MG: 5 TABLET ORAL at 18:46

## 2023-01-01 RX ADMIN — DULOXETINE HYDROCHLORIDE 60 MG: 60 CAPSULE, DELAYED RELEASE ORAL at 10:10

## 2023-01-01 RX ADMIN — PREGABALIN 75 MG: 75 CAPSULE ORAL at 21:25

## 2023-01-01 RX ADMIN — CETIRIZINE HYDROCHLORIDE 10 MG: 10 TABLET, FILM COATED ORAL at 09:28

## 2023-01-01 RX ADMIN — ROSUVASTATIN CALCIUM 40 MG: 20 TABLET, FILM COATED ORAL at 17:20

## 2023-01-01 RX ADMIN — POTASSIUM CHLORIDE 10 MEQ: 7.46 INJECTION, SOLUTION INTRAVENOUS at 23:59

## 2023-01-01 RX ADMIN — PIPERACILLIN AND TAZOBACTAM 3.38 G: 3; .375 INJECTION, POWDER, FOR SOLUTION INTRAVENOUS at 07:58

## 2023-01-01 RX ADMIN — PHENYLEPHRINE HYDROCHLORIDE 100 MCG: 10 INJECTION INTRAVENOUS at 09:43

## 2023-01-01 RX ADMIN — SODIUM BICARBONATE 650 MG TABLET 1300 MG: at 19:21

## 2023-01-01 RX ADMIN — OXYCODONE HYDROCHLORIDE 10 MG: 5 TABLET ORAL at 16:19

## 2023-01-01 RX ADMIN — DULOXETINE HYDROCHLORIDE 60 MG: 30 CAPSULE, DELAYED RELEASE PELLETS ORAL at 17:21

## 2023-01-01 RX ADMIN — OXYCODONE HYDROCHLORIDE 10 MG: 5 TABLET ORAL at 21:14

## 2023-01-01 RX ADMIN — DULOXETINE HYDROCHLORIDE 60 MG: 60 CAPSULE, DELAYED RELEASE ORAL at 09:30

## 2023-01-01 RX ADMIN — POTASSIUM CHLORIDE 40 MEQ: 1500 TABLET, EXTENDED RELEASE ORAL at 20:21

## 2023-01-01 RX ADMIN — ROSUVASTATIN CALCIUM 40 MG: 20 TABLET, FILM COATED ORAL at 08:09

## 2023-01-01 RX ADMIN — FENTANYL CITRATE 50 MCG: 50 INJECTION INTRAMUSCULAR; INTRAVENOUS at 13:32

## 2023-01-01 RX ADMIN — HYDROMORPHONE HYDROCHLORIDE 0.2 MG: 0.2 INJECTION, SOLUTION INTRAMUSCULAR; INTRAVENOUS; SUBCUTANEOUS at 06:40

## 2023-01-01 RX ADMIN — DULOXETINE HYDROCHLORIDE 60 MG: 60 CAPSULE, DELAYED RELEASE ORAL at 08:53

## 2023-01-01 RX ADMIN — HYDROMORPHONE HYDROCHLORIDE 0.2 MG: 0.2 INJECTION, SOLUTION INTRAMUSCULAR; INTRAVENOUS; SUBCUTANEOUS at 06:47

## 2023-01-01 RX ADMIN — ONDANSETRON 4 MG: 4 TABLET, ORALLY DISINTEGRATING ORAL at 05:34

## 2023-01-01 RX ADMIN — CITALOPRAM HYDROBROMIDE 40 MG: 20 TABLET ORAL at 09:26

## 2023-01-01 RX ADMIN — POTASSIUM CHLORIDE 40 MEQ: 1500 TABLET, EXTENDED RELEASE ORAL at 08:27

## 2023-01-01 RX ADMIN — POTASSIUM & SODIUM PHOSPHATES POWDER PACK 280-160-250 MG 2 PACKET: 280-160-250 PACK at 09:22

## 2023-01-01 RX ADMIN — OXYCODONE HYDROCHLORIDE 10 MG: 5 TABLET ORAL at 07:45

## 2023-01-01 RX ADMIN — PIPERACILLIN AND TAZOBACTAM 3.38 G: 3; .375 INJECTION, POWDER, FOR SOLUTION INTRAVENOUS at 01:08

## 2023-01-01 RX ADMIN — PIPERACILLIN AND TAZOBACTAM 3.38 G: 3; .375 INJECTION, POWDER, FOR SOLUTION INTRAVENOUS at 13:59

## 2023-01-01 RX ADMIN — POTASSIUM CHLORIDE, DEXTROSE MONOHYDRATE AND SODIUM CHLORIDE: 150; 5; 450 INJECTION, SOLUTION INTRAVENOUS at 09:27

## 2023-01-01 RX ADMIN — ENOXAPARIN SODIUM 40 MG: 40 INJECTION SUBCUTANEOUS at 08:06

## 2023-01-01 RX ADMIN — CITALOPRAM HYDROBROMIDE 40 MG: 20 TABLET ORAL at 08:55

## 2023-01-01 RX ADMIN — SODIUM CHLORIDE: 9 INJECTION, SOLUTION INTRAVENOUS at 12:16

## 2023-01-01 RX ADMIN — POTASSIUM CHLORIDE 10 MEQ: 10 INJECTION, SOLUTION INTRAVENOUS at 15:44

## 2023-01-01 RX ADMIN — SODIUM BICARBONATE 650 MG TABLET 1300 MG: at 13:55

## 2023-01-01 RX ADMIN — Medication 1 MG: at 21:07

## 2023-01-01 RX ADMIN — LORAZEPAM 0.5 MG: 2 INJECTION INTRAMUSCULAR; INTRAVENOUS at 15:46

## 2023-01-01 RX ADMIN — SODIUM CHLORIDE 85 ML: 9 INJECTION, SOLUTION INTRAVENOUS at 15:41

## 2023-01-01 RX ADMIN — CITALOPRAM HYDROBROMIDE 40 MG: 20 TABLET ORAL at 08:16

## 2023-01-01 RX ADMIN — POTASSIUM & SODIUM PHOSPHATES POWDER PACK 280-160-250 MG 2 PACKET: 280-160-250 PACK at 15:55

## 2023-01-01 RX ADMIN — ONDANSETRON 4 MG: 2 INJECTION INTRAMUSCULAR; INTRAVENOUS at 00:11

## 2023-01-01 RX ADMIN — IOPAMIDOL 100 ML: 755 INJECTION, SOLUTION INTRAVENOUS at 12:59

## 2023-01-01 RX ADMIN — PROCHLORPERAZINE EDISYLATE 5 MG: 5 INJECTION INTRAMUSCULAR; INTRAVENOUS at 23:15

## 2023-01-01 RX ADMIN — SODIUM BICARBONATE: 84 INJECTION, SOLUTION INTRAVENOUS at 17:56

## 2023-01-01 RX ADMIN — OXYCODONE HYDROCHLORIDE 10 MG: 5 TABLET ORAL at 21:06

## 2023-01-01 RX ADMIN — LOPERAMIDE HYDROCHLORIDE 2 MG: 2 CAPSULE ORAL at 21:06

## 2023-01-01 RX ADMIN — PREGABALIN 150 MG: 150 CAPSULE ORAL at 09:30

## 2023-01-01 RX ADMIN — ONDANSETRON 4 MG: 2 INJECTION INTRAMUSCULAR; INTRAVENOUS at 15:08

## 2023-01-01 RX ADMIN — ZOLPIDEM TARTRATE 5 MG: 5 TABLET ORAL at 22:01

## 2023-01-01 RX ADMIN — CITALOPRAM HYDROBROMIDE 40 MG: 20 TABLET ORAL at 09:24

## 2023-01-01 RX ADMIN — POTASSIUM CHLORIDE 10 MEQ: 7.46 INJECTION, SOLUTION INTRAVENOUS at 20:47

## 2023-01-01 RX ADMIN — POTASSIUM CHLORIDE 10 MEQ: 1.5 SOLUTION ORAL at 08:18

## 2023-01-01 RX ADMIN — PREGABALIN 150 MG: 100 CAPSULE ORAL at 08:09

## 2023-01-01 RX ADMIN — PIPERACILLIN AND TAZOBACTAM 3.38 G: 3; .375 INJECTION, POWDER, FOR SOLUTION INTRAVENOUS at 08:15

## 2023-01-01 RX ADMIN — DULOXETINE HYDROCHLORIDE 60 MG: 30 CAPSULE, DELAYED RELEASE PELLETS ORAL at 08:41

## 2023-01-01 RX ADMIN — SODIUM CHLORIDE, POTASSIUM CHLORIDE, SODIUM LACTATE AND CALCIUM CHLORIDE: 600; 310; 30; 20 INJECTION, SOLUTION INTRAVENOUS at 09:30

## 2023-01-01 RX ADMIN — ENOXAPARIN SODIUM 40 MG: 40 INJECTION SUBCUTANEOUS at 09:26

## 2023-01-01 RX ADMIN — POTASSIUM & SODIUM PHOSPHATES POWDER PACK 280-160-250 MG 2 PACKET: 280-160-250 PACK at 09:03

## 2023-01-01 RX ADMIN — POTASSIUM CHLORIDE 40 MEQ: 1.5 FOR SOLUTION ORAL at 18:54

## 2023-01-01 RX ADMIN — POTASSIUM CHLORIDE 10 MEQ: 7.46 INJECTION, SOLUTION INTRAVENOUS at 02:30

## 2023-01-01 RX ADMIN — ONDANSETRON 4 MG: 2 INJECTION INTRAMUSCULAR; INTRAVENOUS at 18:34

## 2023-01-01 RX ADMIN — PANTOPRAZOLE SODIUM 40 MG: 40 INJECTION, POWDER, FOR SOLUTION INTRAVENOUS at 20:30

## 2023-01-01 RX ADMIN — CETIRIZINE HYDROCHLORIDE 10 MG: 10 TABLET, FILM COATED ORAL at 09:30

## 2023-01-01 RX ADMIN — PIPERACILLIN AND TAZOBACTAM 3.38 G: 3; .375 INJECTION, POWDER, FOR SOLUTION INTRAVENOUS at 02:15

## 2023-01-01 RX ADMIN — POTASSIUM CHLORIDE 10 MEQ: 7.46 INJECTION, SOLUTION INTRAVENOUS at 19:36

## 2023-01-01 RX ADMIN — PREGABALIN 75 MG: 75 CAPSULE ORAL at 08:30

## 2023-01-01 RX ADMIN — CITALOPRAM HYDROBROMIDE 40 MG: 20 TABLET ORAL at 10:08

## 2023-01-01 RX ADMIN — POTASSIUM CHLORIDE 10 MEQ: 7.46 INJECTION, SOLUTION INTRAVENOUS at 22:46

## 2023-01-01 RX ADMIN — ONDANSETRON 4 MG: 2 INJECTION INTRAMUSCULAR; INTRAVENOUS at 09:14

## 2023-01-01 RX ADMIN — PANTOPRAZOLE SODIUM 40 MG: 40 TABLET, DELAYED RELEASE ORAL at 06:48

## 2023-01-01 RX ADMIN — PREGABALIN 150 MG: 100 CAPSULE ORAL at 08:41

## 2023-01-01 RX ADMIN — SODIUM CHLORIDE: 9 INJECTION, SOLUTION INTRAVENOUS at 17:30

## 2023-01-01 RX ADMIN — POTASSIUM CHLORIDE 40 MEQ: 1500 TABLET, EXTENDED RELEASE ORAL at 07:59

## 2023-01-01 RX ADMIN — MEBROFENIN 6.5 MILLICURIE: 45 INJECTION, POWDER, LYOPHILIZED, FOR SOLUTION INTRAVENOUS at 10:45

## 2023-01-01 RX ADMIN — LEVOTHYROXINE SODIUM 100 MCG: 100 TABLET ORAL at 08:16

## 2023-01-01 RX ADMIN — POTASSIUM CHLORIDE 10 MEQ: 7.46 INJECTION, SOLUTION INTRAVENOUS at 22:34

## 2023-01-01 RX ADMIN — FENTANYL CITRATE 25 MCG: 50 INJECTION, SOLUTION INTRAMUSCULAR; INTRAVENOUS at 11:34

## 2023-01-01 RX ADMIN — DULOXETINE HYDROCHLORIDE 60 MG: 60 CAPSULE, DELAYED RELEASE ORAL at 12:48

## 2023-01-01 RX ADMIN — SODIUM BICARBONATE 650 MG TABLET 1300 MG: at 08:42

## 2023-01-01 RX ADMIN — SODIUM CHLORIDE 1000 ML: 9 INJECTION, SOLUTION INTRAVENOUS at 18:34

## 2023-01-01 RX ADMIN — CETIRIZINE HYDROCHLORIDE 10 MG: 10 TABLET, FILM COATED ORAL at 08:30

## 2023-01-01 RX ADMIN — POTASSIUM CHLORIDE 20 MEQ: 1.5 SOLUTION ORAL at 19:39

## 2023-01-01 RX ADMIN — POTASSIUM CHLORIDE: 2 INJECTION, SOLUTION, CONCENTRATE INTRAVENOUS at 00:00

## 2023-01-01 RX ADMIN — HYDROMORPHONE HYDROCHLORIDE 0.5 MG: 1 INJECTION, SOLUTION INTRAMUSCULAR; INTRAVENOUS; SUBCUTANEOUS at 10:11

## 2023-01-01 RX ADMIN — SODIUM BICARBONATE 650 MG TABLET 1300 MG: at 16:03

## 2023-01-01 RX ADMIN — HYDROMORPHONE HYDROCHLORIDE 0.2 MG: 0.2 INJECTION, SOLUTION INTRAMUSCULAR; INTRAVENOUS; SUBCUTANEOUS at 02:06

## 2023-01-01 RX ADMIN — LOPERAMIDE HYDROCHLORIDE 2 MG: 2 CAPSULE ORAL at 21:13

## 2023-01-01 RX ADMIN — ONDANSETRON 4 MG: 4 TABLET, ORALLY DISINTEGRATING ORAL at 23:37

## 2023-01-01 RX ADMIN — OXYCODONE HYDROCHLORIDE 10 MG: 5 TABLET ORAL at 02:51

## 2023-01-01 RX ADMIN — LEVOTHYROXINE SODIUM 100 MCG: 100 TABLET ORAL at 08:55

## 2023-01-01 RX ADMIN — DEXTROSE 15 G: 15 GEL ORAL at 20:52

## 2023-01-01 RX ADMIN — POTASSIUM CHLORIDE AND SODIUM CHLORIDE: 900; 150 INJECTION, SOLUTION INTRAVENOUS at 00:11

## 2023-01-01 RX ADMIN — SODIUM BICARBONATE 650 MG TABLET 1300 MG: at 07:44

## 2023-01-01 RX ADMIN — SODIUM BICARBONATE 650 MG TABLET 1300 MG: at 13:19

## 2023-01-01 RX ADMIN — POTASSIUM & SODIUM PHOSPHATES POWDER PACK 280-160-250 MG 2 PACKET: 280-160-250 PACK at 17:58

## 2023-01-01 RX ADMIN — LEVOTHYROXINE SODIUM 100 MCG: 100 TABLET ORAL at 12:49

## 2023-01-01 RX ADMIN — SODIUM BICARBONATE 650 MG TABLET 1300 MG: at 09:25

## 2023-01-01 RX ADMIN — PIPERACILLIN AND TAZOBACTAM 3.38 G: 3; .375 INJECTION, POWDER, FOR SOLUTION INTRAVENOUS at 14:28

## 2023-01-01 RX ADMIN — PREGABALIN 150 MG: 100 CAPSULE ORAL at 17:21

## 2023-01-01 RX ADMIN — PHENYLEPHRINE HYDROCHLORIDE 100 MCG: 10 INJECTION INTRAVENOUS at 09:51

## 2023-01-01 RX ADMIN — POTASSIUM CHLORIDE 40 MEQ: 1500 TABLET, EXTENDED RELEASE ORAL at 14:18

## 2023-01-01 RX ADMIN — OXYCODONE HYDROCHLORIDE 10 MG: 5 TABLET ORAL at 05:57

## 2023-01-01 RX ADMIN — ZOLPIDEM TARTRATE 5 MG: 5 TABLET ORAL at 20:47

## 2023-01-01 RX ADMIN — ENOXAPARIN SODIUM 40 MG: 40 INJECTION SUBCUTANEOUS at 08:38

## 2023-01-01 RX ADMIN — POTASSIUM CHLORIDE 10 MEQ: 10 INJECTION, SOLUTION INTRAVENOUS at 14:36

## 2023-01-01 RX ADMIN — ROCURONIUM BROMIDE 50 MG: 50 INJECTION, SOLUTION INTRAVENOUS at 09:38

## 2023-01-01 RX ADMIN — Medication 2 SPRAY: at 19:40

## 2023-01-01 RX ADMIN — PIPERACILLIN AND TAZOBACTAM 3.38 G: 3; .375 INJECTION, POWDER, FOR SOLUTION INTRAVENOUS at 08:25

## 2023-01-01 RX ADMIN — CETIRIZINE HYDROCHLORIDE 10 MG: 10 TABLET, FILM COATED ORAL at 08:31

## 2023-01-01 RX ADMIN — OXYCODONE HYDROCHLORIDE 5 MG: 5 TABLET ORAL at 09:49

## 2023-01-01 RX ADMIN — METOPROLOL SUCCINATE 25 MG: 25 TABLET, EXTENDED RELEASE ORAL at 09:28

## 2023-01-01 RX ADMIN — SODIUM CHLORIDE, POTASSIUM CHLORIDE, SODIUM LACTATE AND CALCIUM CHLORIDE 500 ML: 600; 310; 30; 20 INJECTION, SOLUTION INTRAVENOUS at 08:08

## 2023-01-01 RX ADMIN — PIPERACILLIN AND TAZOBACTAM 3.38 G: 3; .375 INJECTION, POWDER, FOR SOLUTION INTRAVENOUS at 13:37

## 2023-01-01 RX ADMIN — Medication 2 SPRAY: at 13:55

## 2023-01-01 RX ADMIN — LIDOCAINE HYDROCHLORIDE 100 MG: 20 INJECTION, SOLUTION INFILTRATION; PERINEURAL at 09:38

## 2023-01-01 RX ADMIN — POTASSIUM & SODIUM PHOSPHATES POWDER PACK 280-160-250 MG 1 PACKET: 280-160-250 PACK at 09:25

## 2023-01-01 RX ADMIN — CITALOPRAM HYDROBROMIDE 40 MG: 20 TABLET ORAL at 12:49

## 2023-01-01 RX ADMIN — CITALOPRAM HYDROBROMIDE 40 MG: 20 TABLET ORAL at 09:30

## 2023-01-01 RX ADMIN — HYDROMORPHONE HYDROCHLORIDE 0.2 MG: 0.2 INJECTION, SOLUTION INTRAMUSCULAR; INTRAVENOUS; SUBCUTANEOUS at 08:16

## 2023-01-01 RX ADMIN — CETIRIZINE HYDROCHLORIDE 10 MG: 10 TABLET, FILM COATED ORAL at 08:53

## 2023-01-01 RX ADMIN — PROCHLORPERAZINE EDISYLATE 5 MG: 5 INJECTION INTRAMUSCULAR; INTRAVENOUS at 05:13

## 2023-01-01 RX ADMIN — OXYCODONE HYDROCHLORIDE 10 MG: 5 TABLET ORAL at 08:18

## 2023-01-01 RX ADMIN — PROCHLORPERAZINE EDISYLATE 5 MG: 5 INJECTION INTRAMUSCULAR; INTRAVENOUS at 02:47

## 2023-01-01 RX ADMIN — ENOXAPARIN SODIUM 40 MG: 40 INJECTION SUBCUTANEOUS at 08:09

## 2023-01-01 RX ADMIN — ONDANSETRON 4 MG: 4 TABLET, ORALLY DISINTEGRATING ORAL at 09:34

## 2023-01-01 RX ADMIN — SODIUM CHLORIDE: 9 INJECTION, SOLUTION INTRAVENOUS at 07:01

## 2023-01-01 RX ADMIN — Medication 2 SPRAY: at 15:41

## 2023-01-01 RX ADMIN — POTASSIUM CHLORIDE 40 MEQ: 1500 TABLET, EXTENDED RELEASE ORAL at 10:09

## 2023-01-01 RX ADMIN — POTASSIUM CHLORIDE 40 MEQ: 1500 TABLET, EXTENDED RELEASE ORAL at 09:28

## 2023-01-01 RX ADMIN — PROPOFOL 150 MG: 10 INJECTION, EMULSION INTRAVENOUS at 13:32

## 2023-01-01 RX ADMIN — Medication 1 MG: at 19:38

## 2023-01-01 RX ADMIN — CETIRIZINE HYDROCHLORIDE 10 MG: 10 TABLET, FILM COATED ORAL at 12:48

## 2023-01-01 RX ADMIN — SODIUM CHLORIDE, SODIUM LACTATE, POTASSIUM CHLORIDE, CALCIUM CHLORIDE AND DEXTROSE MONOHYDRATE: 5; 600; 310; 30; 20 INJECTION, SOLUTION INTRAVENOUS at 08:25

## 2023-01-01 RX ADMIN — INFLUENZA A VIRUS A/VICTORIA/4897/2022 IVR-238 (H1N1) ANTIGEN (FORMALDEHYDE INACTIVATED), INFLUENZA A VIRUS A/DARWIN/9/2021 SAN-010 (H3N2) ANTIGEN (FORMALDEHYDE INACTIVATED), INFLUENZA B VIRUS B/PHUKET/3073/2013 ANTIGEN (FORMALDEHYDE INACTIVATED), AND INFLUENZA B VIRUS B/MICHIGAN/01/2021 ANTIGEN (FORMALDEHYDE INACTIVATED) 0.7 ML: 60; 60; 60; 60 INJECTION, SUSPENSION INTRAMUSCULAR at 09:26

## 2023-01-01 RX ADMIN — Medication 1 MG: at 22:45

## 2023-01-01 RX ADMIN — ROSUVASTATIN CALCIUM 40 MG: 20 TABLET, FILM COATED ORAL at 09:25

## 2023-01-01 RX ADMIN — LEVOTHYROXINE SODIUM 100 MCG: 100 TABLET ORAL at 09:26

## 2023-01-01 RX ADMIN — CHOLESTYRAMINE 4 G: 4 POWDER, FOR SUSPENSION ORAL at 08:18

## 2023-01-01 RX ADMIN — POTASSIUM CHLORIDE 10 MEQ: 7.46 INJECTION, SOLUTION INTRAVENOUS at 12:37

## 2023-01-01 RX ADMIN — DEXAMETHASONE SODIUM PHOSPHATE 4 MG: 4 INJECTION, SOLUTION INTRA-ARTICULAR; INTRALESIONAL; INTRAMUSCULAR; INTRAVENOUS; SOFT TISSUE at 13:40

## 2023-01-01 RX ADMIN — DEXAMETHASONE SODIUM PHOSPHATE 4 MG: 4 INJECTION, SOLUTION INTRA-ARTICULAR; INTRALESIONAL; INTRAMUSCULAR; INTRAVENOUS; SOFT TISSUE at 09:50

## 2023-01-01 RX ADMIN — SINCALIDE 1.6 MCG: 5 INJECTION, POWDER, LYOPHILIZED, FOR SOLUTION INTRAVENOUS at 12:02

## 2023-01-01 RX ADMIN — Medication 2 SPRAY: at 19:39

## 2023-01-01 RX ADMIN — POTASSIUM CHLORIDE 10 MEQ: 7.46 INJECTION, SOLUTION INTRAVENOUS at 06:51

## 2023-01-01 RX ADMIN — SENNOSIDES 2 TABLET: 8.6 TABLET, FILM COATED ORAL at 12:48

## 2023-01-01 RX ADMIN — ONDANSETRON 4 MG: 4 TABLET, ORALLY DISINTEGRATING ORAL at 00:26

## 2023-01-01 RX ADMIN — ONDANSETRON 4 MG: 2 INJECTION INTRAMUSCULAR; INTRAVENOUS at 08:20

## 2023-01-01 RX ADMIN — OXYCODONE HYDROCHLORIDE 10 MG: 5 TABLET ORAL at 16:46

## 2023-01-01 RX ADMIN — SODIUM BICARBONATE 650 MG TABLET 1300 MG: at 15:34

## 2023-01-01 RX ADMIN — PIPERACILLIN AND TAZOBACTAM 3.38 G: 3; .375 INJECTION, POWDER, FOR SOLUTION INTRAVENOUS at 02:00

## 2023-01-01 RX ADMIN — SUGAMMADEX 200 MG: 100 INJECTION, SOLUTION INTRAVENOUS at 10:49

## 2023-01-01 RX ADMIN — POTASSIUM CHLORIDE 10 MEQ: 10 INJECTION, SOLUTION INTRAVENOUS at 12:19

## 2023-01-01 RX ADMIN — POTASSIUM CHLORIDE 40 MEQ: 1500 TABLET, EXTENDED RELEASE ORAL at 08:30

## 2023-01-01 RX ADMIN — PANTOPRAZOLE SODIUM 40 MG: 40 INJECTION, POWDER, FOR SOLUTION INTRAVENOUS at 08:15

## 2023-01-01 RX ADMIN — SODIUM BICARBONATE 650 MG TABLET 1300 MG: at 21:06

## 2023-01-01 RX ADMIN — PIPERACILLIN AND TAZOBACTAM 3.38 G: 3; .375 INJECTION, POWDER, FOR SOLUTION INTRAVENOUS at 07:06

## 2023-01-01 RX ADMIN — POTASSIUM CHLORIDE 40 MEQ: 1.5 FOR SOLUTION ORAL at 21:13

## 2023-01-01 RX ADMIN — POTASSIUM CHLORIDE 20 MEQ: 1500 TABLET, EXTENDED RELEASE ORAL at 16:56

## 2023-01-01 RX ADMIN — BARIUM SULFATE 1 ML: 400 SUSPENSION ORAL at 09:31

## 2023-01-01 RX ADMIN — PANTOPRAZOLE SODIUM 40 MG: 40 TABLET, DELAYED RELEASE ORAL at 16:24

## 2023-01-01 RX ADMIN — PREGABALIN 150 MG: 150 CAPSULE ORAL at 10:10

## 2023-01-01 RX ADMIN — POTASSIUM CHLORIDE, DEXTROSE MONOHYDRATE AND SODIUM CHLORIDE: 150; 5; 450 INJECTION, SOLUTION INTRAVENOUS at 05:51

## 2023-01-01 RX ADMIN — OXYCODONE HYDROCHLORIDE 10 MG: 5 TABLET ORAL at 08:14

## 2023-01-01 RX ADMIN — PANTOPRAZOLE SODIUM 40 MG: 40 INJECTION, POWDER, FOR SOLUTION INTRAVENOUS at 09:59

## 2023-01-01 RX ADMIN — LEVOTHYROXINE SODIUM 100 MCG: 100 TABLET ORAL at 08:41

## 2023-01-01 RX ADMIN — PHENYLEPHRINE HYDROCHLORIDE 100 MCG: 10 INJECTION INTRAVENOUS at 09:49

## 2023-01-01 RX ADMIN — ONDANSETRON 4 MG: 4 TABLET, ORALLY DISINTEGRATING ORAL at 05:17

## 2023-01-01 RX ADMIN — SODIUM BICARBONATE 650 MG TABLET 1300 MG: at 15:55

## 2023-01-01 RX ADMIN — PREGABALIN 150 MG: 150 CAPSULE ORAL at 13:37

## 2023-01-01 RX ADMIN — Medication 2 SPRAY: at 11:16

## 2023-01-01 RX ADMIN — ONDANSETRON 4 MG: 2 INJECTION INTRAMUSCULAR; INTRAVENOUS at 11:12

## 2023-01-01 RX ADMIN — PIPERACILLIN AND TAZOBACTAM 3.38 G: 3; .375 INJECTION, POWDER, FOR SOLUTION INTRAVENOUS at 13:50

## 2023-01-01 RX ADMIN — AMPICILLIN SODIUM AND SULBACTAM SODIUM 3 G: 2; 1 INJECTION, POWDER, FOR SOLUTION INTRAMUSCULAR; INTRAVENOUS at 13:45

## 2023-01-01 RX ADMIN — SODIUM CHLORIDE, POTASSIUM CHLORIDE, SODIUM LACTATE AND CALCIUM CHLORIDE 1000 ML: 600; 310; 30; 20 INJECTION, SOLUTION INTRAVENOUS at 05:06

## 2023-01-01 RX ADMIN — POTASSIUM CHLORIDE 10 MEQ: 750 TABLET, EXTENDED RELEASE ORAL at 08:09

## 2023-01-01 RX ADMIN — HYDROMORPHONE HYDROCHLORIDE 0.2 MG: 0.2 INJECTION, SOLUTION INTRAMUSCULAR; INTRAVENOUS; SUBCUTANEOUS at 00:11

## 2023-01-01 RX ADMIN — Medication 2 SPRAY: at 11:01

## 2023-01-01 RX ADMIN — POTASSIUM CHLORIDE 10 MEQ: 7.46 INJECTION, SOLUTION INTRAVENOUS at 06:16

## 2023-01-01 RX ADMIN — ONDANSETRON 4 MG: 4 TABLET, ORALLY DISINTEGRATING ORAL at 17:40

## 2023-01-01 RX ADMIN — POTASSIUM CHLORIDE 10 MEQ: 7.46 INJECTION, SOLUTION INTRAVENOUS at 02:12

## 2023-01-01 RX ADMIN — SODIUM BICARBONATE 650 MG TABLET 1300 MG: at 20:04

## 2023-01-01 RX ADMIN — ONDANSETRON 4 MG: 2 INJECTION INTRAMUSCULAR; INTRAVENOUS at 12:17

## 2023-01-01 RX ADMIN — METOPROLOL SUCCINATE 25 MG: 25 TABLET, EXTENDED RELEASE ORAL at 08:30

## 2023-01-01 RX ADMIN — LEVOTHYROXINE SODIUM 100 MCG: 100 TABLET ORAL at 10:09

## 2023-01-01 RX ADMIN — POTASSIUM CHLORIDE AND SODIUM CHLORIDE: 900; 150 INJECTION, SOLUTION INTRAVENOUS at 08:04

## 2023-01-01 RX ADMIN — ENOXAPARIN SODIUM 40 MG: 40 INJECTION SUBCUTANEOUS at 08:54

## 2023-01-01 RX ADMIN — SODIUM BICARBONATE 650 MG TABLET 1300 MG: at 20:46

## 2023-01-01 RX ADMIN — POTASSIUM & SODIUM PHOSPHATES POWDER PACK 280-160-250 MG 1 PACKET: 280-160-250 PACK at 13:55

## 2023-01-01 RX ADMIN — HYDROMORPHONE HYDROCHLORIDE 0.2 MG: 0.2 INJECTION, SOLUTION INTRAMUSCULAR; INTRAVENOUS; SUBCUTANEOUS at 17:40

## 2023-01-01 RX ADMIN — ZOLPIDEM TARTRATE 5 MG: 5 TABLET ORAL at 21:21

## 2023-01-01 RX ADMIN — POTASSIUM CHLORIDE 40 MEQ: 1.5 FOR SOLUTION ORAL at 08:18

## 2023-01-01 RX ADMIN — ONDANSETRON 4 MG: 4 TABLET, ORALLY DISINTEGRATING ORAL at 21:07

## 2023-01-01 RX ADMIN — PIPERACILLIN AND TAZOBACTAM 3.38 G: 3; .375 INJECTION, POWDER, FOR SOLUTION INTRAVENOUS at 21:06

## 2023-01-01 RX ADMIN — OXYCODONE HYDROCHLORIDE 5 MG: 5 TABLET ORAL at 12:44

## 2023-01-01 RX ADMIN — HYDROMORPHONE HYDROCHLORIDE 0.2 MG: 0.2 INJECTION, SOLUTION INTRAMUSCULAR; INTRAVENOUS; SUBCUTANEOUS at 15:59

## 2023-01-01 RX ADMIN — OXYCODONE HYDROCHLORIDE 10 MG: 5 TABLET ORAL at 16:43

## 2023-01-01 RX ADMIN — ONDANSETRON 4 MG: 4 TABLET, ORALLY DISINTEGRATING ORAL at 21:04

## 2023-01-01 RX ADMIN — AMPICILLIN SODIUM AND SULBACTAM SODIUM 3 G: 2; 1 INJECTION, POWDER, FOR SOLUTION INTRAMUSCULAR; INTRAVENOUS at 13:18

## 2023-01-01 RX ADMIN — Medication 2 MG: at 13:20

## 2023-01-01 RX ADMIN — PREGABALIN 75 MG: 75 CAPSULE ORAL at 08:55

## 2023-01-01 RX ADMIN — POTASSIUM CHLORIDE 10 MEQ: 7.46 INJECTION, SOLUTION INTRAVENOUS at 00:04

## 2023-01-01 RX ADMIN — SODIUM BICARBONATE 650 MG TABLET 1300 MG: at 14:58

## 2023-01-01 RX ADMIN — LEVOTHYROXINE SODIUM 100 MCG: 100 TABLET ORAL at 17:22

## 2023-01-01 RX ADMIN — METOPROLOL SUCCINATE 25 MG: 25 TABLET, EXTENDED RELEASE ORAL at 12:48

## 2023-01-01 RX ADMIN — HYDROMORPHONE HYDROCHLORIDE 0.2 MG: 0.2 INJECTION, SOLUTION INTRAMUSCULAR; INTRAVENOUS; SUBCUTANEOUS at 20:26

## 2023-01-01 RX ADMIN — AMPICILLIN SODIUM AND SULBACTAM SODIUM 3 G: 2; 1 INJECTION, POWDER, FOR SOLUTION INTRAMUSCULAR; INTRAVENOUS at 06:09

## 2023-01-01 RX ADMIN — MIRTAZAPINE 15 MG: 15 TABLET, FILM COATED ORAL at 22:45

## 2023-01-01 RX ADMIN — OXYCODONE HYDROCHLORIDE 5 MG: 5 TABLET ORAL at 09:37

## 2023-01-01 RX ADMIN — PROCHLORPERAZINE MALEATE 5 MG: 5 TABLET ORAL at 09:35

## 2023-01-01 RX ADMIN — OXYCODONE HYDROCHLORIDE 10 MG: 5 TABLET ORAL at 15:38

## 2023-01-01 RX ADMIN — LOPERAMIDE HYDROCHLORIDE 2 MG: 2 CAPSULE ORAL at 08:52

## 2023-01-01 RX ADMIN — OXYCODONE HYDROCHLORIDE 10 MG: 5 TABLET ORAL at 09:32

## 2023-01-01 RX ADMIN — OXYCODONE HYDROCHLORIDE 10 MG: 5 TABLET ORAL at 15:02

## 2023-01-01 RX ADMIN — POTASSIUM CHLORIDE: 2 INJECTION, SOLUTION, CONCENTRATE INTRAVENOUS at 13:13

## 2023-01-01 RX ADMIN — Medication 2 SPRAY: at 12:38

## 2023-01-01 RX ADMIN — OXYCODONE HYDROCHLORIDE 5 MG: 5 TABLET ORAL at 11:24

## 2023-01-01 RX ADMIN — Medication 2 SPRAY: at 07:46

## 2023-01-01 RX ADMIN — PREGABALIN 150 MG: 100 CAPSULE ORAL at 09:25

## 2023-01-01 RX ADMIN — PROCHLORPERAZINE MALEATE 5 MG: 5 TABLET ORAL at 21:01

## 2023-01-01 RX ADMIN — METRONIDAZOLE 500 MG: 500 TABLET ORAL at 11:30

## 2023-01-01 RX ADMIN — ONDANSETRON 4 MG: 4 TABLET, ORALLY DISINTEGRATING ORAL at 07:42

## 2023-01-01 RX ADMIN — ROSUVASTATIN CALCIUM 40 MG: 20 TABLET, FILM COATED ORAL at 08:55

## 2023-01-01 RX ADMIN — POTASSIUM & SODIUM PHOSPHATES POWDER PACK 280-160-250 MG 1 PACKET: 280-160-250 PACK at 14:38

## 2023-01-01 RX ADMIN — ZOLPIDEM TARTRATE 5 MG: 5 TABLET ORAL at 19:38

## 2023-01-01 RX ADMIN — OXYCODONE HYDROCHLORIDE 10 MG: 5 TABLET ORAL at 13:40

## 2023-01-01 RX ADMIN — METOPROLOL SUCCINATE 25 MG: 25 TABLET, EXTENDED RELEASE ORAL at 08:40

## 2023-01-01 RX ADMIN — PHENYLEPHRINE HYDROCHLORIDE 100 MCG: 10 INJECTION INTRAVENOUS at 10:34

## 2023-01-01 RX ADMIN — POTASSIUM CHLORIDE AND SODIUM CHLORIDE: 900; 150 INJECTION, SOLUTION INTRAVENOUS at 14:50

## 2023-01-01 RX ADMIN — PANTOPRAZOLE SODIUM 40 MG: 40 INJECTION, POWDER, FOR SOLUTION INTRAVENOUS at 08:21

## 2023-01-01 RX ADMIN — SODIUM CHLORIDE 1000 ML: 9 INJECTION, SOLUTION INTRAVENOUS at 11:05

## 2023-01-01 RX ADMIN — POTASSIUM CHLORIDE 20 MEQ: 1500 TABLET, EXTENDED RELEASE ORAL at 01:05

## 2023-01-01 RX ADMIN — POTASSIUM CHLORIDE 40 MEQ: 1.5 FOR SOLUTION ORAL at 09:04

## 2023-01-01 RX ADMIN — SODIUM BICARBONATE: 84 INJECTION, SOLUTION INTRAVENOUS at 01:08

## 2023-01-01 RX ADMIN — METOPROLOL SUCCINATE 25 MG: 25 TABLET, EXTENDED RELEASE ORAL at 08:31

## 2023-01-01 RX ADMIN — HYDROMORPHONE HYDROCHLORIDE 0.2 MG: 0.2 INJECTION, SOLUTION INTRAMUSCULAR; INTRAVENOUS; SUBCUTANEOUS at 00:32

## 2023-01-01 RX ADMIN — Medication 2 SPRAY: at 07:58

## 2023-01-01 RX ADMIN — CIPROFLOXACIN HYDROCHLORIDE 500 MG: 500 TABLET, FILM COATED ORAL at 11:30

## 2023-01-01 RX ADMIN — POTASSIUM CHLORIDE 10 MEQ: 7.46 INJECTION, SOLUTION INTRAVENOUS at 08:27

## 2023-01-01 RX ADMIN — ZOLPIDEM TARTRATE 5 MG: 5 TABLET ORAL at 21:14

## 2023-01-01 RX ADMIN — ROSUVASTATIN CALCIUM 40 MG: 20 TABLET, FILM COATED ORAL at 08:53

## 2023-01-01 RX ADMIN — FENTANYL CITRATE 100 MCG: 50 INJECTION INTRAMUSCULAR; INTRAVENOUS at 09:38

## 2023-01-01 RX ADMIN — HYDROMORPHONE HYDROCHLORIDE 0.2 MG: 0.2 INJECTION, SOLUTION INTRAMUSCULAR; INTRAVENOUS; SUBCUTANEOUS at 08:19

## 2023-01-01 RX ADMIN — HYDROMORPHONE HYDROCHLORIDE 0.2 MG: 0.2 INJECTION, SOLUTION INTRAMUSCULAR; INTRAVENOUS; SUBCUTANEOUS at 16:24

## 2023-01-01 RX ADMIN — SODIUM BICARBONATE: 84 INJECTION, SOLUTION INTRAVENOUS at 09:30

## 2023-01-01 RX ADMIN — Medication 2 SPRAY: at 13:31

## 2023-01-01 RX ADMIN — ONDANSETRON 4 MG: 2 INJECTION INTRAMUSCULAR; INTRAVENOUS at 06:40

## 2023-01-01 RX ADMIN — SODIUM CHLORIDE: 9 INJECTION, SOLUTION INTRAVENOUS at 19:37

## 2023-01-01 RX ADMIN — SODIUM BICARBONATE 650 MG TABLET 1300 MG: at 07:57

## 2023-01-01 RX ADMIN — POTASSIUM & SODIUM PHOSPHATES POWDER PACK 280-160-250 MG 2 PACKET: 280-160-250 PACK at 13:31

## 2023-01-01 RX ADMIN — POTASSIUM CHLORIDE 40 MEQ: 1500 TABLET, EXTENDED RELEASE ORAL at 09:30

## 2023-01-01 RX ADMIN — LEVOTHYROXINE SODIUM 100 MCG: 100 TABLET ORAL at 09:30

## 2023-01-01 RX ADMIN — ROSUVASTATIN CALCIUM 40 MG: 20 TABLET, FILM COATED ORAL at 20:41

## 2023-01-01 RX ADMIN — FENTANYL CITRATE 50 MCG: 50 INJECTION, SOLUTION INTRAMUSCULAR; INTRAVENOUS at 08:52

## 2023-01-01 RX ADMIN — POTASSIUM & SODIUM PHOSPHATES POWDER PACK 280-160-250 MG 2 PACKET: 280-160-250 PACK at 20:11

## 2023-01-01 RX ADMIN — CHOLESTYRAMINE 4 G: 4 POWDER, FOR SUSPENSION ORAL at 14:58

## 2023-01-01 RX ADMIN — AMPICILLIN SODIUM AND SULBACTAM SODIUM 3 G: 2; 1 INJECTION, POWDER, FOR SOLUTION INTRAMUSCULAR; INTRAVENOUS at 17:40

## 2023-01-01 RX ADMIN — LOPERAMIDE HYDROCHLORIDE 2 MG: 2 CAPSULE ORAL at 08:41

## 2023-01-01 RX ADMIN — PANTOPRAZOLE SODIUM 40 MG: 40 TABLET, DELAYED RELEASE ORAL at 16:56

## 2023-01-01 RX ADMIN — POTASSIUM CHLORIDE 10 MEQ: 10 INJECTION, SOLUTION INTRAVENOUS at 23:08

## 2023-01-01 RX ADMIN — ZOLPIDEM TARTRATE 2.5 MG: 5 TABLET ORAL at 22:45

## 2023-01-01 RX ADMIN — ENOXAPARIN SODIUM 40 MG: 40 INJECTION SUBCUTANEOUS at 09:24

## 2023-01-01 RX ADMIN — ONDANSETRON 4 MG: 4 TABLET, ORALLY DISINTEGRATING ORAL at 06:40

## 2023-01-01 RX ADMIN — POTASSIUM CHLORIDE 10 MEQ: 7.46 INJECTION, SOLUTION INTRAVENOUS at 07:19

## 2023-01-01 RX ADMIN — OXYCODONE HYDROCHLORIDE 10 MG: 5 TABLET ORAL at 15:41

## 2023-01-01 RX ADMIN — POTASSIUM & SODIUM PHOSPHATES POWDER PACK 280-160-250 MG 1 PACKET: 280-160-250 PACK at 21:07

## 2023-01-01 RX ADMIN — POTASSIUM CHLORIDE 20 MEQ: 1500 TABLET, EXTENDED RELEASE ORAL at 11:07

## 2023-01-01 RX ADMIN — PANTOPRAZOLE SODIUM 40 MG: 40 INJECTION, POWDER, FOR SOLUTION INTRAVENOUS at 20:28

## 2023-01-01 RX ADMIN — PIPERACILLIN AND TAZOBACTAM 3.38 G: 3; .375 INJECTION, POWDER, FOR SOLUTION INTRAVENOUS at 12:08

## 2023-01-01 RX ADMIN — SODIUM CHLORIDE, POTASSIUM CHLORIDE, SODIUM LACTATE AND CALCIUM CHLORIDE: 600; 310; 30; 20 INJECTION, SOLUTION INTRAVENOUS at 13:22

## 2023-01-01 RX ADMIN — OXYCODONE HYDROCHLORIDE 10 MG: 5 TABLET ORAL at 21:21

## 2023-01-01 RX ADMIN — DULOXETINE HYDROCHLORIDE 60 MG: 30 CAPSULE, DELAYED RELEASE PELLETS ORAL at 09:26

## 2023-01-01 RX ADMIN — DULOXETINE HYDROCHLORIDE 60 MG: 60 CAPSULE, DELAYED RELEASE ORAL at 09:28

## 2023-01-01 RX ADMIN — METOPROLOL SUCCINATE 25 MG: 25 TABLET, EXTENDED RELEASE ORAL at 08:55

## 2023-01-01 RX ADMIN — CITALOPRAM HYDROBROMIDE 40 MG: 20 TABLET ORAL at 08:40

## 2023-01-01 ASSESSMENT — ACTIVITIES OF DAILY LIVING (ADL)
ADLS_ACUITY_SCORE: 22
ADLS_ACUITY_SCORE: 35
ADLS_ACUITY_SCORE: 22
ADLS_ACUITY_SCORE: 29
ADLS_ACUITY_SCORE: 24
ADLS_ACUITY_SCORE: 22
ADLS_ACUITY_SCORE: 24
ADLS_ACUITY_SCORE: 29
ADLS_ACUITY_SCORE: 35
ADLS_ACUITY_SCORE: 28
ADLS_ACUITY_SCORE: 37
ADLS_ACUITY_SCORE: 24
ADLS_ACUITY_SCORE: 38
ADLS_ACUITY_SCORE: 37
ADLS_ACUITY_SCORE: 24
ADLS_ACUITY_SCORE: 35
ADLS_ACUITY_SCORE: 28
ADLS_ACUITY_SCORE: 24
ADLS_ACUITY_SCORE: 22
ADLS_ACUITY_SCORE: 30
ADLS_ACUITY_SCORE: 22
ADLS_ACUITY_SCORE: 42
ADLS_ACUITY_SCORE: 24
ADLS_ACUITY_SCORE: 22
ADLS_ACUITY_SCORE: 22
DEPENDENT_IADLS:: INDEPENDENT
ADLS_ACUITY_SCORE: 22
ADLS_ACUITY_SCORE: 24
ADLS_ACUITY_SCORE: 27
ADLS_ACUITY_SCORE: 44
DRESSING/BATHING_DIFFICULTY: NO
ADLS_ACUITY_SCORE: 29
ADLS_ACUITY_SCORE: 22
ADLS_ACUITY_SCORE: 27
ADLS_ACUITY_SCORE: 28
ADLS_ACUITY_SCORE: 27
ADLS_ACUITY_SCORE: 40
ADLS_ACUITY_SCORE: 29
ADLS_ACUITY_SCORE: 43
ADLS_ACUITY_SCORE: 44
ADLS_ACUITY_SCORE: 43
ADLS_ACUITY_SCORE: 29
ADLS_ACUITY_SCORE: 28
ADLS_ACUITY_SCORE: 24
SWALLOWING: 2-->DIFFICULTY SWALLOWING FOODS
ADLS_ACUITY_SCORE: 28
ADLS_ACUITY_SCORE: 29
CONCENTRATING,_REMEMBERING_OR_MAKING_DECISIONS_DIFFICULTY: YES
ADLS_ACUITY_SCORE: 42
ADLS_ACUITY_SCORE: 38
ADLS_ACUITY_SCORE: 22
ADLS_ACUITY_SCORE: 24
ADLS_ACUITY_SCORE: 22
ADLS_ACUITY_SCORE: 24
ADLS_ACUITY_SCORE: 22
ADLS_ACUITY_SCORE: 24
DEPENDENT_IADLS:: TRANSPORTATION
ADLS_ACUITY_SCORE: 24
ADLS_ACUITY_SCORE: 24
ADLS_ACUITY_SCORE: 42
DEPENDENT_IADLS:: INDEPENDENT
ADLS_ACUITY_SCORE: 24
ADLS_ACUITY_SCORE: 37
ADLS_ACUITY_SCORE: 24
ADLS_ACUITY_SCORE: 22
TOILETING_ISSUES: NO
ADLS_ACUITY_SCORE: 22
ADLS_ACUITY_SCORE: 36
ADLS_ACUITY_SCORE: 27
ADLS_ACUITY_SCORE: 38
ADLS_ACUITY_SCORE: 28
ADLS_ACUITY_SCORE: 24
ADLS_ACUITY_SCORE: 24
ADLS_ACUITY_SCORE: 44
ADLS_ACUITY_SCORE: 43
ADLS_ACUITY_SCORE: 37
ADLS_ACUITY_SCORE: 27
ADLS_ACUITY_SCORE: 28
ADLS_ACUITY_SCORE: 26
ADLS_ACUITY_SCORE: 24
ADLS_ACUITY_SCORE: 44
ADLS_ACUITY_SCORE: 42
ADLS_ACUITY_SCORE: 22
ADLS_ACUITY_SCORE: 29
ADLS_ACUITY_SCORE: 29
ADLS_ACUITY_SCORE: 28
ADLS_ACUITY_SCORE: 22
ADLS_ACUITY_SCORE: 42
ADLS_ACUITY_SCORE: 24
ADLS_ACUITY_SCORE: 43
ADLS_ACUITY_SCORE: 43
ADLS_ACUITY_SCORE: 29
ADLS_ACUITY_SCORE: 24
ADLS_ACUITY_SCORE: 29
ADLS_ACUITY_SCORE: 22
ADLS_ACUITY_SCORE: 35
ADLS_ACUITY_SCORE: 24
ADLS_ACUITY_SCORE: 44
ADLS_ACUITY_SCORE: 43
ADLS_ACUITY_SCORE: 22
ADLS_ACUITY_SCORE: 38
ADLS_ACUITY_SCORE: 35
ADLS_ACUITY_SCORE: 29
ADLS_ACUITY_SCORE: 22
ADLS_ACUITY_SCORE: 35
ADLS_ACUITY_SCORE: 28
ADLS_ACUITY_SCORE: 37
ADLS_ACUITY_SCORE: 43
ADLS_ACUITY_SCORE: 36
ADLS_ACUITY_SCORE: 34
ADLS_ACUITY_SCORE: 43
EATING: 0-->INDEPENDENT
ADLS_ACUITY_SCORE: 42
ADLS_ACUITY_SCORE: 24
ADLS_ACUITY_SCORE: 43
ADLS_ACUITY_SCORE: 43
ADLS_ACUITY_SCORE: 42
ADLS_ACUITY_SCORE: 22
ADLS_ACUITY_SCORE: 24
ADLS_ACUITY_SCORE: 42
ADLS_ACUITY_SCORE: 22
ADLS_ACUITY_SCORE: 24
ADLS_ACUITY_SCORE: 22
ADLS_ACUITY_SCORE: 27
ADLS_ACUITY_SCORE: 29
ADLS_ACUITY_SCORE: 37
ADLS_ACUITY_SCORE: 44
ADLS_ACUITY_SCORE: 24
ADLS_ACUITY_SCORE: 22
EATING/SWALLOWING: SWALLOWING SOLID FOOD
ADLS_ACUITY_SCORE: 29
ADLS_ACUITY_SCORE: 22
DOING_ERRANDS_INDEPENDENTLY_DIFFICULTY: YES
ADLS_ACUITY_SCORE: 28
ADLS_ACUITY_SCORE: 29
ADLS_ACUITY_SCORE: 43
ADLS_ACUITY_SCORE: 24
ADLS_ACUITY_SCORE: 43
ADLS_ACUITY_SCORE: 44
ADLS_ACUITY_SCORE: 27
ADLS_ACUITY_SCORE: 30
CHANGE_IN_FUNCTIONAL_STATUS_SINCE_ONSET_OF_CURRENT_ILLNESS/INJURY: YES
FALL_HISTORY_WITHIN_LAST_SIX_MONTHS: YES
ADLS_ACUITY_SCORE: 43
ADLS_ACUITY_SCORE: 35
ADLS_ACUITY_SCORE: 24
DIFFICULTY_EATING/SWALLOWING: YES
ADLS_ACUITY_SCORE: 24
ADLS_ACUITY_SCORE: 24
DEPENDENT_IADLS:: INDEPENDENT
SWALLOWING: 2-->DIFFICULTY SWALLOWING FOODS
ADLS_ACUITY_SCORE: 24
ADLS_ACUITY_SCORE: 43
ADLS_ACUITY_SCORE: 22
ADLS_ACUITY_SCORE: 29
ADLS_ACUITY_SCORE: 24
ADLS_ACUITY_SCORE: 24
ADLS_ACUITY_SCORE: 27
ADLS_ACUITY_SCORE: 28
ADLS_ACUITY_SCORE: 35
ADLS_ACUITY_SCORE: 29
ADLS_ACUITY_SCORE: 37
ADLS_ACUITY_SCORE: 43
WALKING_OR_CLIMBING_STAIRS_DIFFICULTY: NO
ADLS_ACUITY_SCORE: 24
ADLS_ACUITY_SCORE: 29
ADLS_ACUITY_SCORE: 34
ADLS_ACUITY_SCORE: 29
ADLS_ACUITY_SCORE: 35
ADLS_ACUITY_SCORE: 29
ADLS_ACUITY_SCORE: 22
ADLS_ACUITY_SCORE: 30
NUMBER_OF_TIMES_PATIENT_HAS_FALLEN_WITHIN_LAST_SIX_MONTHS: 5
EATING: 0-->INDEPENDENT
ADLS_ACUITY_SCORE: 35
ADLS_ACUITY_SCORE: 42
ADLS_ACUITY_SCORE: 22
ADLS_ACUITY_SCORE: 24
ADLS_ACUITY_SCORE: 22
ADLS_ACUITY_SCORE: 31
ADLS_ACUITY_SCORE: 28
ADLS_ACUITY_SCORE: 22
ADLS_ACUITY_SCORE: 43
ADLS_ACUITY_SCORE: 24
WEAR_GLASSES_OR_BLIND: NO
ADLS_ACUITY_SCORE: 44
ADLS_ACUITY_SCORE: 24
ADLS_ACUITY_SCORE: 24
ADLS_ACUITY_SCORE: 28
ADLS_ACUITY_SCORE: 24
ADLS_ACUITY_SCORE: 35
ADLS_ACUITY_SCORE: 22
ADLS_ACUITY_SCORE: 28
ADLS_ACUITY_SCORE: 24
ADLS_ACUITY_SCORE: 44
ADLS_ACUITY_SCORE: 24
ADLS_ACUITY_SCORE: 24
ADLS_ACUITY_SCORE: 22

## 2023-01-01 ASSESSMENT — ANXIETY QUESTIONNAIRES
6. BECOMING EASILY ANNOYED OR IRRITABLE: NOT AT ALL
2. NOT BEING ABLE TO STOP OR CONTROL WORRYING: SEVERAL DAYS
6. BECOMING EASILY ANNOYED OR IRRITABLE: NOT AT ALL
GAD7 TOTAL SCORE: 4
4. TROUBLE RELAXING: NEARLY EVERY DAY
1. FEELING NERVOUS, ANXIOUS, OR ON EDGE: NEARLY EVERY DAY
1. FEELING NERVOUS, ANXIOUS, OR ON EDGE: SEVERAL DAYS
IF YOU CHECKED OFF ANY PROBLEMS ON THIS QUESTIONNAIRE, HOW DIFFICULT HAVE THESE PROBLEMS MADE IT FOR YOU TO DO YOUR WORK, TAKE CARE OF THINGS AT HOME, OR GET ALONG WITH OTHER PEOPLE: SOMEWHAT DIFFICULT
5. BEING SO RESTLESS THAT IT IS HARD TO SIT STILL: NOT AT ALL
7. FEELING AFRAID AS IF SOMETHING AWFUL MIGHT HAPPEN: NOT AT ALL
2. NOT BEING ABLE TO STOP OR CONTROL WORRYING: SEVERAL DAYS
IF YOU CHECKED OFF ANY PROBLEMS ON THIS QUESTIONNAIRE, HOW DIFFICULT HAVE THESE PROBLEMS MADE IT FOR YOU TO DO YOUR WORK, TAKE CARE OF THINGS AT HOME, OR GET ALONG WITH OTHER PEOPLE: VERY DIFFICULT
4. TROUBLE RELAXING: SEVERAL DAYS
GAD7 TOTAL SCORE: 9
GAD7 TOTAL SCORE: 9
3. WORRYING TOO MUCH ABOUT DIFFERENT THINGS: SEVERAL DAYS
5. BEING SO RESTLESS THAT IT IS HARD TO SIT STILL: NOT AT ALL
3. WORRYING TOO MUCH ABOUT DIFFERENT THINGS: SEVERAL DAYS
7. FEELING AFRAID AS IF SOMETHING AWFUL MIGHT HAPPEN: SEVERAL DAYS
GAD7 TOTAL SCORE: 4

## 2023-01-01 ASSESSMENT — ENCOUNTER SYMPTOMS
PSYCHIATRIC NEGATIVE: 1
ACTIVITY CHANGE: 1
NAUSEA: 1
VOMITING: 1
APPETITE CHANGE: 1
FATIGUE: 1
FEVER: 0
APPETITE CHANGE: 1
WEAKNESS: 1
MYALGIAS: 1
CARDIOVASCULAR NEGATIVE: 1
GASTROINTESTINAL NEGATIVE: 1
RESPIRATORY NEGATIVE: 1
MUSCULOSKELETAL NEGATIVE: 1
SHORTNESS OF BREATH: 0
COUGH: 0
NEUROLOGICAL NEGATIVE: 1
ABDOMINAL PAIN: 1
RESPIRATORY NEGATIVE: 1
FEVER: 0
EYES NEGATIVE: 1

## 2023-01-01 ASSESSMENT — PAIN SCALES - GENERAL
PAINLEVEL: NO PAIN (0)
PAINLEVEL: MODERATE PAIN (4)
PAINLEVEL: SEVERE PAIN (6)

## 2023-01-03 ENCOUNTER — MYC REFILL (OUTPATIENT)
Dept: FAMILY MEDICINE | Facility: CLINIC | Age: 73
End: 2023-01-03

## 2023-01-03 DIAGNOSIS — F41.9 ANXIETY: ICD-10-CM

## 2023-01-03 NOTE — TELEPHONE ENCOUNTER
Requested Prescriptions   Pending Prescriptions Disp Refills     ALPRAZolam (XANAX) 0.5 MG tablet 120 tablet 0     Sig: Take 1 tablet (0.5 mg) by mouth 4 times daily as needed for anxiety     Next 5 appointments (look out 90 days)    Jan 17, 2023  9:40 AM  (Arrive by 9:20 AM)  Provider Visit with Monico Rivers MD  [facility] [address] [place] [id number]  [phone]           Routing refill request to provider for review/approval because:  Drug not on the [facility], [facility] or [facility] refill protocol or controlled substance    ALPRAZolam (XANAX) 0.5 MG tablet [Jose Hernandez]       Patient Comment: One more time please.  Vladimir wanted me to fill with current Primary.

## 2023-01-04 NOTE — TELEPHONE ENCOUNTER
Patient is hoping that Dr. Hernandez will fill one more time till he establishes with Dr. Owens. Alina Michael LPN

## 2023-01-05 ENCOUNTER — MYC REFILL (OUTPATIENT)
Dept: FAMILY MEDICINE | Facility: CLINIC | Age: 73
End: 2023-01-05

## 2023-01-05 DIAGNOSIS — F41.9 ANXIETY: ICD-10-CM

## 2023-01-05 RX ORDER — ALPRAZOLAM 0.5 MG
0.5 TABLET ORAL 4 TIMES DAILY PRN
Qty: 120 TABLET | Refills: 0 | Status: SHIPPED | OUTPATIENT
Start: 2023-01-05 | End: 2023-02-08

## 2023-01-05 RX ORDER — ALPRAZOLAM 0.5 MG
0.5 TABLET ORAL 4 TIMES DAILY PRN
Qty: 120 TABLET | Refills: 0 | OUTPATIENT
Start: 2023-01-05

## 2023-01-11 DIAGNOSIS — C76.0 MALIGNANT NEOPLASM OF HEAD, FACE, AND NECK (H): Primary | ICD-10-CM

## 2023-01-11 DIAGNOSIS — C79.52 SECONDARY MALIGNANT NEOPLASM OF BONE AND BONE MARROW (H): ICD-10-CM

## 2023-01-11 DIAGNOSIS — C79.51 SECONDARY MALIGNANT NEOPLASM OF BONE AND BONE MARROW (H): ICD-10-CM

## 2023-01-11 RX ORDER — ALPRAZOLAM 0.5 MG
0.5 TABLET ORAL 4 TIMES DAILY PRN
Qty: 120 TABLET | Refills: 0 | OUTPATIENT
Start: 2023-01-11

## 2023-01-17 ENCOUNTER — LAB (OUTPATIENT)
Dept: LAB | Facility: CLINIC | Age: 73
End: 2023-01-17
Payer: MEDICARE

## 2023-01-17 ENCOUNTER — OFFICE VISIT (OUTPATIENT)
Dept: FAMILY MEDICINE | Facility: CLINIC | Age: 73
End: 2023-01-17
Payer: MEDICARE

## 2023-01-17 VITALS
RESPIRATION RATE: 20 BRPM | HEART RATE: 68 BPM | HEIGHT: 67 IN | BODY MASS INDEX: 31.84 KG/M2 | DIASTOLIC BLOOD PRESSURE: 78 MMHG | OXYGEN SATURATION: 98 % | WEIGHT: 202.9 LBS | TEMPERATURE: 96.9 F | SYSTOLIC BLOOD PRESSURE: 136 MMHG

## 2023-01-17 DIAGNOSIS — Z79.899 ENCOUNTER FOR LONG-TERM (CURRENT) USE OF MEDICATIONS: ICD-10-CM

## 2023-01-17 DIAGNOSIS — D61.818 PANCYTOPENIA (H): ICD-10-CM

## 2023-01-17 DIAGNOSIS — N18.30 STAGE 3 CHRONIC KIDNEY DISEASE, UNSPECIFIED WHETHER STAGE 3A OR 3B CKD (H): ICD-10-CM

## 2023-01-17 DIAGNOSIS — C76.0 MALIGNANT NEOPLASM OF HEAD, FACE, AND NECK (H): ICD-10-CM

## 2023-01-17 DIAGNOSIS — E43 SEVERE PROTEIN-CALORIE MALNUTRITION (H): ICD-10-CM

## 2023-01-17 DIAGNOSIS — F11.20 UNCOMPLICATED OPIOID DEPENDENCE (H): ICD-10-CM

## 2023-01-17 DIAGNOSIS — Z95.5 STATUS POST INSERTION OF DRUG-ELUTING STENT INTO LEFT ANTERIOR DESCENDING ARTERY FOR CORONARY ARTERY DISEASE: ICD-10-CM

## 2023-01-17 DIAGNOSIS — C79.52 SECONDARY MALIGNANT NEOPLASM OF BONE AND BONE MARROW (H): ICD-10-CM

## 2023-01-17 DIAGNOSIS — C79.51 MALIGNANT NEOPLASM METASTATIC TO BONE (H): ICD-10-CM

## 2023-01-17 DIAGNOSIS — Z76.89 ENCOUNTER TO ESTABLISH CARE: Primary | ICD-10-CM

## 2023-01-17 DIAGNOSIS — C79.51 SECONDARY MALIGNANT NEOPLASM OF BONE AND BONE MARROW (H): ICD-10-CM

## 2023-01-17 DIAGNOSIS — F41.9 ANXIETY: Primary | ICD-10-CM

## 2023-01-17 DIAGNOSIS — E03.4 HYPOTHYROIDISM DUE TO ACQUIRED ATROPHY OF THYROID: ICD-10-CM

## 2023-01-17 DIAGNOSIS — C09.9 SQUAMOUS CELL CARCINOMA OF LEFT TONSIL (H): ICD-10-CM

## 2023-01-17 DIAGNOSIS — F41.9 ANXIETY: ICD-10-CM

## 2023-01-17 DIAGNOSIS — G47.01 SLEEP DISORDER DUE TO A GENERAL MEDICAL CONDITION, INSOMNIA TYPE: ICD-10-CM

## 2023-01-17 DIAGNOSIS — I10 HYPERTENSION GOAL BP (BLOOD PRESSURE) < 140/90: ICD-10-CM

## 2023-01-17 DIAGNOSIS — C61 MALIGNANT NEOPLASM OF PROSTATE (H): ICD-10-CM

## 2023-01-17 DIAGNOSIS — R07.0 THROAT PAIN: ICD-10-CM

## 2023-01-17 DIAGNOSIS — J32.0 CHRONIC MAXILLARY SINUSITIS: ICD-10-CM

## 2023-01-17 LAB
ALBUMIN SERPL BCG-MCNC: 3.5 G/DL (ref 3.5–5.2)
ALP SERPL-CCNC: 72 U/L (ref 40–129)
ALT SERPL W P-5'-P-CCNC: 55 U/L (ref 10–50)
ANION GAP SERPL CALCULATED.3IONS-SCNC: 11 MMOL/L (ref 7–15)
AST SERPL W P-5'-P-CCNC: 26 U/L (ref 10–50)
BILIRUB SERPL-MCNC: 0.2 MG/DL
BUN SERPL-MCNC: 12.7 MG/DL (ref 8–23)
CALCIUM SERPL-MCNC: 8.6 MG/DL (ref 8.8–10.2)
CHLORIDE SERPL-SCNC: 98 MMOL/L (ref 98–107)
CREAT SERPL-MCNC: 0.93 MG/DL (ref 0.67–1.17)
DEPRECATED HCO3 PLAS-SCNC: 24 MMOL/L (ref 22–29)
GFR SERPL CREATININE-BSD FRML MDRD: 88 ML/MIN/1.73M2
GLUCOSE SERPL-MCNC: 101 MG/DL (ref 70–99)
HOLD SPECIMEN: NORMAL
POTASSIUM SERPL-SCNC: 3.9 MMOL/L (ref 3.4–5.3)
PROT SERPL-MCNC: 5.7 G/DL (ref 6.4–8.3)
SODIUM SERPL-SCNC: 133 MMOL/L (ref 136–145)

## 2023-01-17 PROCEDURE — 36415 COLL VENOUS BLD VENIPUNCTURE: CPT

## 2023-01-17 PROCEDURE — 99214 OFFICE O/P EST MOD 30 MIN: CPT | Performed by: STUDENT IN AN ORGANIZED HEALTH CARE EDUCATION/TRAINING PROGRAM

## 2023-01-17 PROCEDURE — 80307 DRUG TEST PRSMV CHEM ANLYZR: CPT

## 2023-01-17 PROCEDURE — 80053 COMPREHEN METABOLIC PANEL: CPT

## 2023-01-17 ASSESSMENT — ANXIETY QUESTIONNAIRES
7. FEELING AFRAID AS IF SOMETHING AWFUL MIGHT HAPPEN: SEVERAL DAYS
GAD7 TOTAL SCORE: 6
8. IF YOU CHECKED OFF ANY PROBLEMS, HOW DIFFICULT HAVE THESE MADE IT FOR YOU TO DO YOUR WORK, TAKE CARE OF THINGS AT HOME, OR GET ALONG WITH OTHER PEOPLE?: SOMEWHAT DIFFICULT
4. TROUBLE RELAXING: SEVERAL DAYS
2. NOT BEING ABLE TO STOP OR CONTROL WORRYING: SEVERAL DAYS
GAD7 TOTAL SCORE: 6
5. BEING SO RESTLESS THAT IT IS HARD TO SIT STILL: SEVERAL DAYS
7. FEELING AFRAID AS IF SOMETHING AWFUL MIGHT HAPPEN: SEVERAL DAYS
IF YOU CHECKED OFF ANY PROBLEMS ON THIS QUESTIONNAIRE, HOW DIFFICULT HAVE THESE PROBLEMS MADE IT FOR YOU TO DO YOUR WORK, TAKE CARE OF THINGS AT HOME, OR GET ALONG WITH OTHER PEOPLE: SOMEWHAT DIFFICULT
GAD7 TOTAL SCORE: 6
3. WORRYING TOO MUCH ABOUT DIFFERENT THINGS: SEVERAL DAYS
1. FEELING NERVOUS, ANXIOUS, OR ON EDGE: SEVERAL DAYS
6. BECOMING EASILY ANNOYED OR IRRITABLE: NOT AT ALL

## 2023-01-17 ASSESSMENT — PAIN SCALES - GENERAL: PAINLEVEL: MODERATE PAIN (4)

## 2023-01-17 NOTE — PROGRESS NOTES
Assessment & Plan     Encounter to establish care  History reviewed and updated.  Currently receiving his care through the [facility] and will continue to follow with them during his cancer treatment.  Long-term hoping to transition back here if at all possible.    Status post insertion of drug-eluting stent into left anterior descending artery for coronary artery disease  Patient continues on rosuvastatin and metoprolol.    Hypertension goal BP (blood pressure) < 140/90  Stable    Malignant neoplasm of prostate (H)  Continue to follow with urology with repeat PSAs.    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  Were controlling mood and anxiety with Cymbalta and getting off Wellbutrin.    Chronic maxillary sinusitis    Hypothyroidism due to acquired atrophy of thyroid  Patient being managed by [facility].  Continues on levothyroxine 112 mcg.    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  Planning for repeat imaging at completion of cycles. 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 Functions improved and oncology decreasing dose.       Uncomplicated opioid dependence (H)  Patient following with palliative care at the [facility].  Currently monitoring his pain medication and would switch to oxycodone.  They are slowly decreasing his Xanax that he has been on chronically.  We also had discussion today regarding importance of stopping this with concurrent opiate use.  Also discussed dangers of mixing chronic Ambien.  He will follow with them for his continued pain management.  - [id number] - Urine Drug Confirmation Panel  "(Comprehensive)         BMI:   Estimated body mass index is 31.78 kg/m  as calculated from the following:    Height as of this encounter: 1.702 m (5' 7\").    Weight as of this encounter: 92 kg (202 lb 14.4 oz).   Weight management plan: Discussed healthy diet and exercise guidelines    Monico Rivers MD  [facility] LAURIE Wolff is a 72 year old presenting for the following health issues:  Establish Care      History of Present Illness       Reason for visit:  Establish care    He eats 0-1 servings of fruits and vegetables daily.He consumes 1 sweetened beverage(s) daily.He exercises with enough effort to increase his heart rate 9 or less minutes per day.  He exercises with enough effort to increase his heart rate 3 or less days per week.   He is taking medications regularly.  Today's AME-7 Score: 6     History of squamous cell carcinoma of the left tonsil.  Previous treatment at [facility] and now with recurrence he is getting every 3 week infusions currently.  Now following with the [facility] oncology and primary care as well as palliative. Does follow with urology monitoring PSAs after prostate cancer.  Has been on chronic opiates for greater than 10 years.  Tells me he was initially started on this for his chronic sinusitis.  Palliative care now dealing with pain management.  Continues to have chronic pain in his left side of his throat.  Does have follow-up with ENT as well.  Patient notes eating and drinking okay at this time.  Patient looking to establish care with new provider closer to home.  History was reviewed and updated now.    Review of Systems   Constitutional, HEENT, cardiovascular, pulmonary, gi and gu systems are negative, except as otherwise noted.      Objective    /78 (BP Location: Right arm, Patient Position: Chair, Cuff Size: Adult Large)   Pulse 68   Temp 96.9  F (36.1  C) (Temporal)   Resp 20   Ht 1.702 m (5' 7\")   Wt 92 kg (202 lb 14.4 " oz)   SpO2 98%   BMI 31.78 kg/m    Body mass index is 31.78 kg/m .  Physical Exam   GENERAL: healthy, alert and no distress  EYES: Eyes grossly normal to inspection, PERRL and conjunctivae and sclerae normal  HENT: nose and mouth without ulcers or lesions  NECK: no adenopathy,  RESP: lungs clear to auscultation - no rales, rhonchi or wheezes  CV: regular rate and rhythm, normal S1 S2, no S3 or S4, no murmur, click or rub, trace peripheral edema and peripheral pulses strong  MS: no gross musculoskeletal defects noted, no edema  SKIN: no suspicious lesions or rashes  NEURO: mentation intact and speech normal  PSYCH: mentation appears normal, affect normal

## 2023-01-18 LAB — CREAT UR-MCNC: 55 MG/DL

## 2023-01-19 DIAGNOSIS — C76.0 MALIGNANT NEOPLASM OF HEAD, FACE, AND NECK (H): ICD-10-CM

## 2023-01-19 DIAGNOSIS — C79.52 SECONDARY MALIGNANT NEOPLASM OF BONE AND BONE MARROW (H): Primary | ICD-10-CM

## 2023-01-19 DIAGNOSIS — Z79.899 HIGH RISK MEDICATION USE: ICD-10-CM

## 2023-01-19 DIAGNOSIS — C79.51 SECONDARY MALIGNANT NEOPLASM OF BONE AND BONE MARROW (H): Primary | ICD-10-CM

## 2023-01-20 LAB
A-OH ALPRAZ UR QL CFM: PRESENT
ALPRAZ UR QL CFM: PRESENT
OXYCODONE UR CFM-MCNC: 1200 NG/ML
OXYCODONE/CREAT UR: 2182 NG/MG {CREAT}
OXYMORPHONE UR CFM-MCNC: 238 NG/ML
OXYMORPHONE/CREAT UR: 433 NG/MG {CREAT}
PREGABALIN UR QL CFM: PRESENT

## 2023-01-25 ENCOUNTER — LAB (OUTPATIENT)
Dept: LAB | Facility: CLINIC | Age: 73
End: 2023-01-25
Payer: MEDICARE

## 2023-01-25 DIAGNOSIS — C79.52 SECONDARY MALIGNANT NEOPLASM OF BONE AND BONE MARROW (H): ICD-10-CM

## 2023-01-25 DIAGNOSIS — Z79.899 HIGH RISK MEDICATION USE: ICD-10-CM

## 2023-01-25 DIAGNOSIS — C76.0 MALIGNANT NEOPLASM OF HEAD, FACE, AND NECK (H): ICD-10-CM

## 2023-01-25 DIAGNOSIS — R68.2 DRY MOUTH: ICD-10-CM

## 2023-01-25 DIAGNOSIS — C79.51 SECONDARY MALIGNANT NEOPLASM OF BONE AND BONE MARROW (H): ICD-10-CM

## 2023-01-25 LAB
ALBUMIN SERPL BCG-MCNC: 3.7 G/DL (ref 3.5–5.2)
ALP SERPL-CCNC: 71 U/L (ref 40–129)
ALT SERPL W P-5'-P-CCNC: 60 U/L (ref 10–50)
ANION GAP SERPL CALCULATED.3IONS-SCNC: 12 MMOL/L (ref 7–15)
AST SERPL W P-5'-P-CCNC: 34 U/L (ref 10–50)
BILIRUB SERPL-MCNC: 0.3 MG/DL
BUN SERPL-MCNC: 13.1 MG/DL (ref 8–23)
CALCIUM SERPL-MCNC: 8.6 MG/DL (ref 8.8–10.2)
CHLORIDE SERPL-SCNC: 99 MMOL/L (ref 98–107)
CREAT SERPL-MCNC: 1.04 MG/DL (ref 0.67–1.17)
DEPRECATED HCO3 PLAS-SCNC: 24 MMOL/L (ref 22–29)
GFR SERPL CREATININE-BSD FRML MDRD: 77 ML/MIN/1.73M2
GLUCOSE SERPL-MCNC: 156 MG/DL (ref 70–99)
POTASSIUM SERPL-SCNC: 4.7 MMOL/L (ref 3.4–5.3)
PROT SERPL-MCNC: 5.7 G/DL (ref 6.4–8.3)
SODIUM SERPL-SCNC: 135 MMOL/L (ref 136–145)

## 2023-01-25 PROCEDURE — 36415 COLL VENOUS BLD VENIPUNCTURE: CPT

## 2023-01-25 PROCEDURE — 80053 COMPREHEN METABOLIC PANEL: CPT

## 2023-01-30 RX ORDER — PILOCARPINE HYDROCHLORIDE 5 MG/1
5 TABLET, FILM COATED ORAL 3 TIMES DAILY
Qty: 90 TABLET | Refills: 3 | OUTPATIENT
Start: 2023-01-30

## 2023-02-02 DIAGNOSIS — R79.89 ABNORMAL LIVER FUNCTION TEST: Primary | ICD-10-CM

## 2023-02-07 ENCOUNTER — LAB (OUTPATIENT)
Dept: LAB | Facility: CLINIC | Age: 73
End: 2023-02-07
Payer: MEDICARE

## 2023-02-07 DIAGNOSIS — Z79.899 HIGH RISK MEDICATION USE: ICD-10-CM

## 2023-02-07 DIAGNOSIS — C76.0 MALIGNANT NEOPLASM OF HEAD, FACE, AND NECK (H): ICD-10-CM

## 2023-02-07 DIAGNOSIS — C79.51 SECONDARY MALIGNANT NEOPLASM OF BONE AND BONE MARROW (H): ICD-10-CM

## 2023-02-07 DIAGNOSIS — R79.89 ABNORMAL LIVER FUNCTION TEST: ICD-10-CM

## 2023-02-07 DIAGNOSIS — C79.52 SECONDARY MALIGNANT NEOPLASM OF BONE AND BONE MARROW (H): ICD-10-CM

## 2023-02-07 LAB
ALBUMIN SERPL BCG-MCNC: 3.6 G/DL (ref 3.5–5.2)
ALP SERPL-CCNC: 73 U/L (ref 40–129)
ALT SERPL W P-5'-P-CCNC: 40 U/L (ref 10–50)
ANION GAP SERPL CALCULATED.3IONS-SCNC: 12 MMOL/L (ref 7–15)
AST SERPL W P-5'-P-CCNC: 28 U/L (ref 10–50)
BILIRUB DIRECT SERPL-MCNC: <0.2 MG/DL (ref 0–0.3)
BILIRUB SERPL-MCNC: 0.4 MG/DL
BUN SERPL-MCNC: 12 MG/DL (ref 8–23)
CALCIUM SERPL-MCNC: 9.1 MG/DL (ref 8.8–10.2)
CHLORIDE SERPL-SCNC: 100 MMOL/L (ref 98–107)
CREAT SERPL-MCNC: 1.04 MG/DL (ref 0.67–1.17)
DEPRECATED HCO3 PLAS-SCNC: 24 MMOL/L (ref 22–29)
GFR SERPL CREATININE-BSD FRML MDRD: 77 ML/MIN/1.73M2
GLUCOSE SERPL-MCNC: 83 MG/DL (ref 70–99)
POTASSIUM SERPL-SCNC: 3.9 MMOL/L (ref 3.4–5.3)
PROT SERPL-MCNC: 6 G/DL (ref 6.4–8.3)
SODIUM SERPL-SCNC: 136 MMOL/L (ref 136–145)

## 2023-02-07 PROCEDURE — 36415 COLL VENOUS BLD VENIPUNCTURE: CPT

## 2023-02-07 PROCEDURE — 82248 BILIRUBIN DIRECT: CPT

## 2023-02-07 PROCEDURE — 80053 COMPREHEN METABOLIC PANEL: CPT

## 2023-02-08 ENCOUNTER — TELEPHONE (OUTPATIENT)
Dept: FAMILY MEDICINE | Facility: CLINIC | Age: 73
End: 2023-02-08
Payer: MEDICARE

## 2023-02-08 ENCOUNTER — MYC MEDICAL ADVICE (OUTPATIENT)
Dept: FAMILY MEDICINE | Facility: CLINIC | Age: 73
End: 2023-02-08
Payer: MEDICARE

## 2023-02-08 DIAGNOSIS — E03.4 HYPOTHYROIDISM DUE TO ACQUIRED ATROPHY OF THYROID: ICD-10-CM

## 2023-02-08 DIAGNOSIS — R68.2 DRY MOUTH: ICD-10-CM

## 2023-02-08 DIAGNOSIS — F41.9 ANXIETY: ICD-10-CM

## 2023-02-08 RX ORDER — ALPRAZOLAM 0.5 MG
0.5 TABLET ORAL 3 TIMES DAILY PRN
Qty: 21 TABLET | Refills: 0 | COMMUNITY
Start: 2023-02-16 | End: 2023-03-09

## 2023-02-08 RX ORDER — PILOCARPINE HYDROCHLORIDE 7.5 MG/1
7.5 TABLET, FILM COATED ORAL 3 TIMES DAILY
Qty: 90 TABLET | Refills: 0 | COMMUNITY
Start: 2023-02-08 | End: 2023-01-01

## 2023-02-08 RX ORDER — LEVOTHYROXINE SODIUM 150 UG/1
150 TABLET ORAL DAILY
Qty: 30 TABLET | Refills: 0 | COMMUNITY
Start: 2023-02-08 | End: 2023-01-01

## 2023-02-08 RX ORDER — PREGABALIN 150 MG/1
150 CAPSULE ORAL
COMMUNITY
Start: 2023-01-25 | End: 2023-03-21

## 2023-02-08 RX ORDER — SULFAMETHOXAZOLE AND TRIMETHOPRIM 200; 40 MG/5ML; MG/5ML
SUSPENSION ORAL
COMMUNITY
Start: 2023-01-26 | End: 2023-02-25

## 2023-02-08 RX ORDER — DULOXETIN HYDROCHLORIDE 60 MG/1
60 CAPSULE, DELAYED RELEASE ORAL
COMMUNITY
Start: 2023-01-20 | End: 2023-03-22

## 2023-02-08 RX ORDER — LEVOTHYROXINE SODIUM 150 UG/1
150 TABLET ORAL
COMMUNITY
Start: 2023-01-10 | End: 2023-02-08

## 2023-02-08 RX ORDER — OXYCODONE HYDROCHLORIDE 10 MG/1
10 TABLET ORAL
COMMUNITY
Start: 2023-02-07 | End: 2023-02-08

## 2023-02-08 RX ORDER — PILOCARPINE HYDROCHLORIDE 7.5 MG/1
7.5 TABLET, FILM COATED ORAL
COMMUNITY
Start: 2023-02-03 | End: 2023-02-08

## 2023-02-08 RX ORDER — NYSTATIN 100000/ML
500000 SUSPENSION, ORAL (FINAL DOSE FORM) ORAL
COMMUNITY
Start: 2023-01-26 | End: 2023-03-27

## 2023-02-08 RX ORDER — OXYCODONE HYDROCHLORIDE 10 MG/1
10 TABLET ORAL
Qty: 42 TABLET | Refills: 0 | COMMUNITY
Start: 2023-02-08 | End: 2023-03-09

## 2023-02-08 RX ORDER — PREDNISONE 20 MG/1
50 TABLET ORAL
COMMUNITY
Start: 2023-01-16 | End: 2023-01-01

## 2023-02-08 NOTE — TELEPHONE ENCOUNTER
Received call from RUTH Anderson with oncology, palliative med at [facility] who wanted to give an update on patient.    Patient is hoping to transfer her care and pain management to Dr. Rivers now that he is at the end of chemotherapy.  He wants to be sure that Dr. Rivers is comfortable with this.    Patient has 2 more cycles of chemo left.  He has his 7th round on 02/21/23 and his last/8th round on 03/14/23.  He also had a PET scan on 02/20/23 and sees oncology at [place] on 02/21/23.  Everything is going really well, he is showing improvement, and the hope is that the PET scan will continue to show improvement.    The current plan for patient's pain medications is as follows:  OXYCODONE:  10mg every 3 hours prn - no more than 7 tabs daily  Qty 49 for a weeks supply - must last a full 7 days  - every week decreasing by 1 tab daily - starting 02/16/23 it will be no more than 6 tabs daily - qty 42 - etc.  Goal is to taper by 10mg each week and get down to 4 tabs per day and eventually to 5mg tablets.    Patient has been unable to use Tylenol due to his elevated liver enzymes.  Patient is also on non opioid medications that help with pain.    XANAX:  0.5mg tid prn - qty 21 - must last 7 days - next fill on 02/16/23  Patient is really struggling with his anxiety  Has a hard time seeing new providers and wants to stick with one provider for his medications.  He is however seeing a psychiatrist for his psych meds.    RUTH Anderson and provider Dr. Mckenzie (in today) from Palliative med at [place] would like to know if you would feel comfortable taking over patient's Oxycodone medication, including the tapering plan?    If you would like to talk further with RUTH Anderson or one of the RNs available, and/or one of the Providers, you are welcome to call them at T: [phone] for further information.    Please advise.

## 2023-02-09 NOTE — TELEPHONE ENCOUNTER
That is fine but would want to see him after his follow up with oncology once treatment complete. Thanks.

## 2023-02-09 NOTE — TELEPHONE ENCOUNTER
To clarify, patient's chemo treatment will be complete on 03/14/23.  Would you like to see patient after this?

## 2023-02-14 ENCOUNTER — LAB (OUTPATIENT)
Dept: LAB | Facility: CLINIC | Age: 73
End: 2023-02-14
Payer: MEDICARE

## 2023-02-14 DIAGNOSIS — C79.52 SECONDARY MALIGNANT NEOPLASM OF BONE AND BONE MARROW (H): ICD-10-CM

## 2023-02-14 DIAGNOSIS — R79.89 ABNORMAL LIVER FUNCTION TEST: ICD-10-CM

## 2023-02-14 DIAGNOSIS — C79.51 SECONDARY MALIGNANT NEOPLASM OF BONE AND BONE MARROW (H): ICD-10-CM

## 2023-02-14 DIAGNOSIS — Z79.899 HIGH RISK MEDICATION USE: ICD-10-CM

## 2023-02-14 DIAGNOSIS — C76.0 MALIGNANT NEOPLASM OF HEAD, FACE, AND NECK (H): ICD-10-CM

## 2023-02-14 LAB
ALBUMIN SERPL BCG-MCNC: 3.4 G/DL (ref 3.5–5.2)
ALP SERPL-CCNC: 169 U/L (ref 40–129)
ALT SERPL W P-5'-P-CCNC: 146 U/L (ref 10–50)
ANION GAP SERPL CALCULATED.3IONS-SCNC: 13 MMOL/L (ref 7–15)
AST SERPL W P-5'-P-CCNC: 47 U/L (ref 10–50)
BILIRUB DIRECT SERPL-MCNC: <0.2 MG/DL (ref 0–0.3)
BILIRUB SERPL-MCNC: 0.3 MG/DL
BUN SERPL-MCNC: 11.9 MG/DL (ref 8–23)
CALCIUM SERPL-MCNC: 9.1 MG/DL (ref 8.8–10.2)
CHLORIDE SERPL-SCNC: 104 MMOL/L (ref 98–107)
CREAT SERPL-MCNC: 1.03 MG/DL (ref 0.67–1.17)
DEPRECATED HCO3 PLAS-SCNC: 22 MMOL/L (ref 22–29)
GFR SERPL CREATININE-BSD FRML MDRD: 78 ML/MIN/1.73M2
GLUCOSE SERPL-MCNC: 105 MG/DL (ref 70–99)
POTASSIUM SERPL-SCNC: 4.1 MMOL/L (ref 3.4–5.3)
PROT SERPL-MCNC: 6 G/DL (ref 6.4–8.3)
SODIUM SERPL-SCNC: 139 MMOL/L (ref 136–145)

## 2023-02-14 PROCEDURE — 82248 BILIRUBIN DIRECT: CPT

## 2023-02-14 PROCEDURE — 36415 COLL VENOUS BLD VENIPUNCTURE: CPT

## 2023-02-14 PROCEDURE — 80053 COMPREHEN METABOLIC PANEL: CPT

## 2023-02-16 DIAGNOSIS — G47.01 SLEEP DISORDER DUE TO A GENERAL MEDICAL CONDITION, INSOMNIA TYPE: ICD-10-CM

## 2023-02-16 RX ORDER — ZOLPIDEM TARTRATE 12.5 MG/1
TABLET, FILM COATED, EXTENDED RELEASE ORAL
Qty: 30 TABLET | Refills: 0 | Status: SHIPPED | OUTPATIENT
Start: 2023-02-16 | End: 2023-04-18

## 2023-02-16 NOTE — TELEPHONE ENCOUNTER
Patient is needing this filled today please. Thank you!     Vinh Boswell RN on 2/16/2023 at 3:37 PM        Requested Prescriptions   Pending Prescriptions Disp Refills    zolpidem ER (AMBIEN CR) 12.5 MG CR tablet 30 tablet 3     Sig: TAKE ONE TABLET BY MOUTH NIGHTLY AS NEEDED FOR SLEEP. MUST LAST 30 DAYS       There is no refill protocol information for this order

## 2023-02-16 NOTE — TELEPHONE ENCOUNTER
Tiana from [facility] is calling in to request patient's Ambien be filled at [facility] pharmacy. She said they have never filled it and this is not something they can do. They will fill his opioids and Xanax but unable to do Ambien. He does need this filled today. Request will be sent through.

## 2023-02-17 NOTE — TELEPHONE ENCOUNTER
Spoke with patient and informed of note below. Patient understood.     Closing encounter.   Lauren Finley MA

## 2023-03-01 ENCOUNTER — LAB (OUTPATIENT)
Dept: LAB | Facility: CLINIC | Age: 73
End: 2023-03-01
Payer: MEDICARE

## 2023-03-01 DIAGNOSIS — C76.0 MALIGNANT NEOPLASM OF HEAD, FACE, AND NECK (H): ICD-10-CM

## 2023-03-01 DIAGNOSIS — Z79.899 HIGH RISK MEDICATION USE: ICD-10-CM

## 2023-03-01 DIAGNOSIS — C79.52 SECONDARY MALIGNANT NEOPLASM OF BONE AND BONE MARROW (H): ICD-10-CM

## 2023-03-01 DIAGNOSIS — C79.51 SECONDARY MALIGNANT NEOPLASM OF BONE AND BONE MARROW (H): ICD-10-CM

## 2023-03-01 LAB
ALBUMIN SERPL BCG-MCNC: 3.7 G/DL (ref 3.5–5.2)
ALP SERPL-CCNC: 88 U/L (ref 40–129)
ALT SERPL W P-5'-P-CCNC: 64 U/L (ref 10–50)
ANION GAP SERPL CALCULATED.3IONS-SCNC: 17 MMOL/L (ref 7–15)
AST SERPL W P-5'-P-CCNC: 28 U/L (ref 10–50)
BILIRUB SERPL-MCNC: 0.4 MG/DL
BUN SERPL-MCNC: 11.6 MG/DL (ref 8–23)
CALCIUM SERPL-MCNC: 9.3 MG/DL (ref 8.8–10.2)
CHLORIDE SERPL-SCNC: 98 MMOL/L (ref 98–107)
CREAT SERPL-MCNC: 1.09 MG/DL (ref 0.67–1.17)
DEPRECATED HCO3 PLAS-SCNC: 22 MMOL/L (ref 22–29)
GFR SERPL CREATININE-BSD FRML MDRD: 73 ML/MIN/1.73M2
GLUCOSE SERPL-MCNC: 92 MG/DL (ref 70–99)
HOLD SPECIMEN: NORMAL
POTASSIUM SERPL-SCNC: 3.7 MMOL/L (ref 3.4–5.3)
PROT SERPL-MCNC: 6 G/DL (ref 6.4–8.3)
SODIUM SERPL-SCNC: 137 MMOL/L (ref 136–145)

## 2023-03-01 PROCEDURE — 36415 COLL VENOUS BLD VENIPUNCTURE: CPT

## 2023-03-01 PROCEDURE — 80053 COMPREHEN METABOLIC PANEL: CPT

## 2023-03-09 ENCOUNTER — LAB (OUTPATIENT)
Dept: LAB | Facility: CLINIC | Age: 73
End: 2023-03-09
Payer: MEDICARE

## 2023-03-09 ENCOUNTER — OFFICE VISIT (OUTPATIENT)
Dept: FAMILY MEDICINE | Facility: CLINIC | Age: 73
End: 2023-03-09
Payer: MEDICARE

## 2023-03-09 VITALS
RESPIRATION RATE: 20 BRPM | WEIGHT: 211.9 LBS | SYSTOLIC BLOOD PRESSURE: 130 MMHG | TEMPERATURE: 98.9 F | BODY MASS INDEX: 33.19 KG/M2 | DIASTOLIC BLOOD PRESSURE: 76 MMHG | HEART RATE: 88 BPM | OXYGEN SATURATION: 98 %

## 2023-03-09 DIAGNOSIS — C09.9 SQUAMOUS CELL CARCINOMA OF LEFT TONSIL (H): ICD-10-CM

## 2023-03-09 DIAGNOSIS — T50.905A DRUG-INDUCED HEPATITIS: ICD-10-CM

## 2023-03-09 DIAGNOSIS — R79.89 ABNORMAL LIVER FUNCTION TEST: ICD-10-CM

## 2023-03-09 DIAGNOSIS — N18.30 STAGE 3 CHRONIC KIDNEY DISEASE, UNSPECIFIED WHETHER STAGE 3A OR 3B CKD (H): ICD-10-CM

## 2023-03-09 DIAGNOSIS — Z95.5 STATUS POST INSERTION OF DRUG-ELUTING STENT INTO LEFT ANTERIOR DESCENDING ARTERY FOR CORONARY ARTERY DISEASE: ICD-10-CM

## 2023-03-09 DIAGNOSIS — J32.0 CHRONIC MAXILLARY SINUSITIS: ICD-10-CM

## 2023-03-09 DIAGNOSIS — G47.01 SLEEP DISORDER DUE TO A GENERAL MEDICAL CONDITION, INSOMNIA TYPE: ICD-10-CM

## 2023-03-09 DIAGNOSIS — K71.6 DRUG-INDUCED HEPATITIS: ICD-10-CM

## 2023-03-09 DIAGNOSIS — C79.51 SECONDARY MALIGNANT NEOPLASM OF BONE AND BONE MARROW (H): ICD-10-CM

## 2023-03-09 DIAGNOSIS — C76.0 MALIGNANT NEOPLASM OF HEAD, FACE, AND NECK (H): ICD-10-CM

## 2023-03-09 DIAGNOSIS — C79.52 SECONDARY MALIGNANT NEOPLASM OF BONE AND BONE MARROW (H): ICD-10-CM

## 2023-03-09 DIAGNOSIS — Z79.899 HIGH RISK MEDICATION USE: ICD-10-CM

## 2023-03-09 DIAGNOSIS — E03.4 HYPOTHYROIDISM DUE TO ACQUIRED ATROPHY OF THYROID: ICD-10-CM

## 2023-03-09 DIAGNOSIS — F41.9 ANXIETY: ICD-10-CM

## 2023-03-09 DIAGNOSIS — M19.071 OSTEOARTHRITIS OF RIGHT FOOT, UNSPECIFIED OSTEOARTHRITIS TYPE: ICD-10-CM

## 2023-03-09 DIAGNOSIS — D61.818 PANCYTOPENIA (H): ICD-10-CM

## 2023-03-09 DIAGNOSIS — F11.20 UNCOMPLICATED OPIOID DEPENDENCE (H): Primary | ICD-10-CM

## 2023-03-09 LAB
ALBUMIN SERPL BCG-MCNC: 3.8 G/DL (ref 3.5–5.2)
ALP SERPL-CCNC: 67 U/L (ref 40–129)
ALT SERPL W P-5'-P-CCNC: 97 U/L (ref 10–50)
ANION GAP SERPL CALCULATED.3IONS-SCNC: 9 MMOL/L (ref 7–15)
AST SERPL W P-5'-P-CCNC: 64 U/L (ref 10–50)
BILIRUB DIRECT SERPL-MCNC: <0.2 MG/DL (ref 0–0.3)
BILIRUB SERPL-MCNC: 0.5 MG/DL
BUN SERPL-MCNC: 17.8 MG/DL (ref 8–23)
CALCIUM SERPL-MCNC: 9.3 MG/DL (ref 8.8–10.2)
CHLORIDE SERPL-SCNC: 99 MMOL/L (ref 98–107)
CREAT SERPL-MCNC: 0.96 MG/DL (ref 0.67–1.17)
DEPRECATED HCO3 PLAS-SCNC: 23 MMOL/L (ref 22–29)
GFR SERPL CREATININE-BSD FRML MDRD: 85 ML/MIN/1.73M2
GLUCOSE SERPL-MCNC: 98 MG/DL (ref 70–99)
POTASSIUM SERPL-SCNC: 4.4 MMOL/L (ref 3.4–5.3)
PROT SERPL-MCNC: 5.8 G/DL (ref 6.4–8.3)
SODIUM SERPL-SCNC: 131 MMOL/L (ref 136–145)

## 2023-03-09 PROCEDURE — 80053 COMPREHEN METABOLIC PANEL: CPT

## 2023-03-09 PROCEDURE — 99215 OFFICE O/P EST HI 40 MIN: CPT | Performed by: STUDENT IN AN ORGANIZED HEALTH CARE EDUCATION/TRAINING PROGRAM

## 2023-03-09 PROCEDURE — 36415 COLL VENOUS BLD VENIPUNCTURE: CPT

## 2023-03-09 PROCEDURE — 82248 BILIRUBIN DIRECT: CPT

## 2023-03-09 RX ORDER — PROCHLORPERAZINE MALEATE 10 MG
TABLET ORAL
COMMUNITY
Start: 2023-02-14 | End: 2023-01-01

## 2023-03-09 RX ORDER — OXYCODONE HYDROCHLORIDE 10 MG/1
10 TABLET ORAL EVERY 4 HOURS PRN
Qty: 60 TABLET | Refills: 0 | Status: SHIPPED | OUTPATIENT
Start: 2023-03-09 | End: 2023-03-21

## 2023-03-09 RX ORDER — ALPRAZOLAM 0.5 MG
0.5 TABLET ORAL DAILY PRN
Qty: 10 TABLET | Refills: 0 | Status: SHIPPED | OUTPATIENT
Start: 2023-03-09 | End: 2023-01-01

## 2023-03-09 ASSESSMENT — PAIN SCALES - GENERAL: PAINLEVEL: SEVERE PAIN (6)

## 2023-03-09 NOTE — PROGRESS NOTES
Assessment & Plan     Uncomplicated opioid dependence (H)  Patient has cut back down to 4 tablets daily. Will continue for now but plan to further cut back in the future.  Would like him to eventually transition to 5 mg tablets.  Lengthy discussion today regarding long-term consequences and risk of opioid use, especially in the setting of other benzodiazepine use or sedating medications including respiratory suppression and death.  Patient taking as prescribed and will continue to do so.  Recommend he continue Cymbalta and Lyrica for his nerve pain.  We will continue current dose for his cancer related and other chronic pain but would not continue for his chronic sinusitis which he was doing previously and he is understanding of this.  Would not add back Tylenol due to his current liver disease.  Could consider in the future.  Plan to follow-up in 1 month.    Osteoarthritis of right foot, unspecified osteoarthritis type    Fatigue  Cough  Patient with mild congestion and cough for the last 2 days.  Nonproductive and no fevers.  No sick contacts that he knows of.  No wheezing.  Patient did take home COVID test previously but will do so again now.  Reports being on continued amoxicillin and does have loose stools today.  Likely related to acute viral illness but if this persist I would obtain a C. difficile.  Continue to push fluids.  Continue symptomatic care at home and follow-up if new or worsening symptoms develop.  Precautions discussed.  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  They do wish to continue immunotherapy long-term but held currently due to his drug-induced hepatitis.  He is on chronic prednisone for this and recently had dose decreased.  Labs repeated weekly with plan to taper down prednisone and get off of this.  Patient does note increased weakness  with steroid use and feels like his strength has significantly decreased.  Likely multifactorial in the setting of this chemotherapy.  Patient with no chest pain or shortness of breath.  Plan for repeat labs today per oncology hopefully continue to taper prednisone.  He will follow-up with them in 3 months with repeat imaging for continued surveillance.    Anxiety  Plan to stop taking Xanax regularly.  A few provided for an as-needed basis but would discontinue completely in the future.  He will limit use as much as possible.    Chronic maxillary sinusitis  Patient has been using antihistamine and oxycodone for this for many years.  No issues currently    Status post insertion of drug-eluting stent into left anterior descending artery for coronary artery disease  Continues on Crestor, metoprolol and aspirin    Stage 3 chronic kidney disease, unspecified whether stage 3a or 3b CKD (H)    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, insomnia type  Patient on Ambien now but will work on cutting back to half tablets.  Will not continue to be filled per his insurance and ideally would like to get him off these completely.  Plan to cut back to 6 mg at first and see how this does.    Hypothyroidism due to acquired atrophy of thyroid  Followed at the [facility].  Currently on replacement    Follow-up in 1 month.  Sooner if new or worsening issues arise.    45 minutes spent in direct patient contact with exam, counseling and history, 5 minutes spent with chart review    Monico Rivers MD  [facility]    Claribel Wolff is a 72 year old, presenting for the following health issues:  Follow Up      History of Present Illness       Reason for visit:  [facility] follow up,    He eats 0-1 servings of fruits and vegetables daily.He consumes 1 sweetened beverage(s) daily.He exercises with enough effort to increase his heart rate 9 or less minutes per day.    He is taking medications regularly.       Go  over medications and wanting to switch all medications over.  Patient has been following with oncology at the [facility] as well as palliative care.  Planning to transition care here no longer seeing palliative for his medications.  He has been on chronic opiates as well as benzodiazepines for a very long time and was increased with his recent cancer.  Has actually decreased dose back to 4 10 mg tablets of oxycodone daily as well as 2 half milligram Xanax daily.  Has also been on Ambien but insurance company notified him he would not feel to continue this now.  He has always had issues with sleep but actually better recently.  Patient now off of chemotherapy as well as immunotherapy but they are planning to restart immunotherapy in the future.  Currently dealing with drug-induced hepatitis and on chronic steroids.  He does note fatigue with this.  Feels like he has muscle weakness has gotten worse over the last few months with the prednisone.  Has had cold symptoms the last 2 days with some fatigue dry cough and congestion.  No fevers.  No sick contacts that he knows of.  No wheezing or shortness of breath.      Review of Systems   Constitutional, HEENT, cardiovascular, pulmonary, gi and gu systems are negative, except as otherwise noted.      Objective    /76   Pulse 88   Temp 98.9  F (37.2  C) (Temporal)   Resp 20   Wt 96.1 kg (211 lb 14.4 oz)   SpO2 98%   BMI 33.19 kg/m    Body mass index is 33.19 kg/m .  Physical Exam   GENERAL: healthy, alert and no distress  EYES: Eyes grossly normal to inspection, PERRL and conjunctivae and sclerae normal  HENT: ear canals and TM's normal, nose and mouth without ulcers or lesions  NECK: no adenopathy  RESP: lungs clear to auscultation - no rales, rhonchi or wheezes  CV: regular rate and rhythm, normal S1 S2, no S3 or S4, no murmur, click or rub, trace peripheral edema  ABDOMEN: soft, nontender, no hepatosplenomegaly, no masses and bowel sounds normal  MS: no gross  musculoskeletal defects noted  SKIN: no suspicious lesions or rashes  NEURO: mentation intact and speech normal  PSYCH: mentation appears normal, affect normal/bright

## 2023-03-13 ENCOUNTER — LAB (OUTPATIENT)
Dept: LAB | Facility: CLINIC | Age: 73
End: 2023-03-13
Payer: MEDICARE

## 2023-03-13 DIAGNOSIS — C79.51 SECONDARY MALIGNANT NEOPLASM OF BONE AND BONE MARROW (H): ICD-10-CM

## 2023-03-13 DIAGNOSIS — C79.52 SECONDARY MALIGNANT NEOPLASM OF BONE AND BONE MARROW (H): ICD-10-CM

## 2023-03-13 DIAGNOSIS — Z79.899 HIGH RISK MEDICATION USE: ICD-10-CM

## 2023-03-13 DIAGNOSIS — R79.89 ABNORMAL LIVER FUNCTION TEST: ICD-10-CM

## 2023-03-13 DIAGNOSIS — C76.0 MALIGNANT NEOPLASM OF HEAD, FACE, AND NECK (H): ICD-10-CM

## 2023-03-13 LAB
ALBUMIN SERPL BCG-MCNC: 3.7 G/DL (ref 3.5–5.2)
ALP SERPL-CCNC: 84 U/L (ref 40–129)
ALT SERPL W P-5'-P-CCNC: 74 U/L (ref 10–50)
ANION GAP SERPL CALCULATED.3IONS-SCNC: 13 MMOL/L (ref 7–15)
AST SERPL W P-5'-P-CCNC: 44 U/L (ref 10–50)
BILIRUB DIRECT SERPL-MCNC: <0.2 MG/DL (ref 0–0.3)
BILIRUB SERPL-MCNC: 0.4 MG/DL
BUN SERPL-MCNC: 27.4 MG/DL (ref 8–23)
CALCIUM SERPL-MCNC: 8.9 MG/DL (ref 8.8–10.2)
CHLORIDE SERPL-SCNC: 102 MMOL/L (ref 98–107)
CREAT SERPL-MCNC: 0.99 MG/DL (ref 0.67–1.17)
DEPRECATED HCO3 PLAS-SCNC: 20 MMOL/L (ref 22–29)
GFR SERPL CREATININE-BSD FRML MDRD: 81 ML/MIN/1.73M2
GLUCOSE SERPL-MCNC: 159 MG/DL (ref 70–99)
POTASSIUM SERPL-SCNC: 4.4 MMOL/L (ref 3.4–5.3)
PROT SERPL-MCNC: 6 G/DL (ref 6.4–8.3)
SODIUM SERPL-SCNC: 135 MMOL/L (ref 136–145)

## 2023-03-13 PROCEDURE — 82248 BILIRUBIN DIRECT: CPT

## 2023-03-13 PROCEDURE — 36415 COLL VENOUS BLD VENIPUNCTURE: CPT

## 2023-03-13 PROCEDURE — 80053 COMPREHEN METABOLIC PANEL: CPT

## 2023-03-20 ENCOUNTER — LAB (OUTPATIENT)
Dept: LAB | Facility: CLINIC | Age: 73
End: 2023-03-20
Payer: MEDICARE

## 2023-03-20 DIAGNOSIS — C79.52 SECONDARY MALIGNANT NEOPLASM OF BONE AND BONE MARROW (H): ICD-10-CM

## 2023-03-20 DIAGNOSIS — R79.89 ABNORMAL LIVER FUNCTION TEST: ICD-10-CM

## 2023-03-20 DIAGNOSIS — C09.9 SQUAMOUS CELL CARCINOMA OF LEFT TONSIL (H): ICD-10-CM

## 2023-03-20 DIAGNOSIS — Z79.899 HIGH RISK MEDICATION USE: ICD-10-CM

## 2023-03-20 DIAGNOSIS — C79.51 SECONDARY MALIGNANT NEOPLASM OF BONE AND BONE MARROW (H): ICD-10-CM

## 2023-03-20 DIAGNOSIS — M19.071 OSTEOARTHRITIS OF RIGHT FOOT, UNSPECIFIED OSTEOARTHRITIS TYPE: ICD-10-CM

## 2023-03-20 DIAGNOSIS — C76.0 MALIGNANT NEOPLASM OF HEAD, FACE, AND NECK (H): ICD-10-CM

## 2023-03-20 LAB
ALBUMIN SERPL BCG-MCNC: 3.9 G/DL (ref 3.5–5.2)
ALP SERPL-CCNC: 77 U/L (ref 40–129)
ALT SERPL W P-5'-P-CCNC: 100 U/L (ref 10–50)
ANION GAP SERPL CALCULATED.3IONS-SCNC: 15 MMOL/L (ref 7–15)
AST SERPL W P-5'-P-CCNC: 73 U/L (ref 10–50)
BILIRUB DIRECT SERPL-MCNC: <0.2 MG/DL (ref 0–0.3)
BILIRUB SERPL-MCNC: 0.4 MG/DL
BUN SERPL-MCNC: 18.9 MG/DL (ref 8–23)
CALCIUM SERPL-MCNC: 9.1 MG/DL (ref 8.8–10.2)
CHLORIDE SERPL-SCNC: 106 MMOL/L (ref 98–107)
CREAT SERPL-MCNC: 1.06 MG/DL (ref 0.67–1.17)
DEPRECATED HCO3 PLAS-SCNC: 20 MMOL/L (ref 22–29)
GFR SERPL CREATININE-BSD FRML MDRD: 75 ML/MIN/1.73M2
GLUCOSE SERPL-MCNC: 128 MG/DL (ref 70–99)
POTASSIUM SERPL-SCNC: 3.7 MMOL/L (ref 3.4–5.3)
PROT SERPL-MCNC: 6.1 G/DL (ref 6.4–8.3)
SODIUM SERPL-SCNC: 141 MMOL/L (ref 136–145)

## 2023-03-20 PROCEDURE — 36415 COLL VENOUS BLD VENIPUNCTURE: CPT

## 2023-03-20 PROCEDURE — 82248 BILIRUBIN DIRECT: CPT

## 2023-03-20 PROCEDURE — 80053 COMPREHEN METABOLIC PANEL: CPT

## 2023-03-21 DIAGNOSIS — M19.071 OSTEOARTHRITIS OF RIGHT FOOT, UNSPECIFIED OSTEOARTHRITIS TYPE: Primary | ICD-10-CM

## 2023-03-21 RX ORDER — PREGABALIN 150 MG/1
150 CAPSULE ORAL DAILY
Qty: 90 CAPSULE | Refills: 0 | Status: SHIPPED | OUTPATIENT
Start: 2023-03-21 | End: 2023-03-22

## 2023-03-21 RX ORDER — OXYCODONE HYDROCHLORIDE 10 MG/1
TABLET ORAL
Qty: 60 TABLET | Refills: 0 | Status: SHIPPED | OUTPATIENT
Start: 2023-03-22 | End: 2023-04-04

## 2023-03-21 NOTE — TELEPHONE ENCOUNTER
Medication sent. Should be lasting him 15 days. Also added lab work but if he is having worsening symptoms he may need to be seen sooner.

## 2023-03-21 NOTE — TELEPHONE ENCOUNTER
Requested Prescriptions   Pending Prescriptions Disp Refills    pregabalin (LYRICA) 150 MG capsule       Sig: Take 1 capsule (150 mg) by mouth       There is no refill protocol information for this order          MELLY KlineN, RN

## 2023-03-22 DIAGNOSIS — M19.071 OSTEOARTHRITIS OF RIGHT FOOT, UNSPECIFIED OSTEOARTHRITIS TYPE: ICD-10-CM

## 2023-03-22 NOTE — TELEPHONE ENCOUNTER
Patient calling, he takes LYRICA 150mg TWICE daily - he states that this was the latest dosage that he was given before he left [facility]     He took his last dose this morning and is needing a refill today.    Patient thinks that the NEW dosage was probably not communicated to the provider when he ended his care at [facility].    Also will be due for a refill of his Cymbalta.     Bri Oshea XRO/

## 2023-03-22 NOTE — TELEPHONE ENCOUNTER
Dr sent script for pt pregablin 150 3/21/23 for once a day dosing - pt has been taking 2 daily now- can dr please rewrite and send new script with updated dosing- he took his last capsule this morning

## 2023-03-23 RX ORDER — PREGABALIN 150 MG/1
150 CAPSULE ORAL 2 TIMES DAILY
Qty: 120 CAPSULE | Refills: 1 | Status: SHIPPED | OUTPATIENT
Start: 2023-03-23 | End: 2023-01-01

## 2023-03-23 RX ORDER — DULOXETIN HYDROCHLORIDE 60 MG/1
60 CAPSULE, DELAYED RELEASE ORAL DAILY
Qty: 90 CAPSULE | Refills: 3 | Status: SHIPPED | OUTPATIENT
Start: 2023-03-23 | End: 2024-01-01

## 2023-03-27 ENCOUNTER — LAB (OUTPATIENT)
Dept: LAB | Facility: CLINIC | Age: 73
End: 2023-03-27
Payer: MEDICARE

## 2023-03-27 DIAGNOSIS — B37.0 THRUSH: Primary | ICD-10-CM

## 2023-03-27 DIAGNOSIS — R79.89 ABNORMAL LIVER FUNCTION TEST: ICD-10-CM

## 2023-03-27 DIAGNOSIS — C79.52 SECONDARY MALIGNANT NEOPLASM OF BONE AND BONE MARROW (H): ICD-10-CM

## 2023-03-27 DIAGNOSIS — Z79.899 HIGH RISK MEDICATION USE: ICD-10-CM

## 2023-03-27 DIAGNOSIS — C79.51 SECONDARY MALIGNANT NEOPLASM OF BONE AND BONE MARROW (H): ICD-10-CM

## 2023-03-27 DIAGNOSIS — C09.9 SQUAMOUS CELL CARCINOMA OF LEFT TONSIL (H): ICD-10-CM

## 2023-03-27 DIAGNOSIS — D53.9 MACROCYTIC ANEMIA: Primary | ICD-10-CM

## 2023-03-27 DIAGNOSIS — C76.0 MALIGNANT NEOPLASM OF HEAD, FACE, AND NECK (H): ICD-10-CM

## 2023-03-27 LAB
ALBUMIN SERPL BCG-MCNC: 3.9 G/DL (ref 3.5–5.2)
ALP SERPL-CCNC: 75 U/L (ref 40–129)
ALT SERPL W P-5'-P-CCNC: 62 U/L (ref 10–50)
ANION GAP SERPL CALCULATED.3IONS-SCNC: 12 MMOL/L (ref 7–15)
AST SERPL W P-5'-P-CCNC: 31 U/L (ref 10–50)
BILIRUB DIRECT SERPL-MCNC: <0.2 MG/DL (ref 0–0.3)
BILIRUB SERPL-MCNC: 0.5 MG/DL
BUN SERPL-MCNC: 23.7 MG/DL (ref 8–23)
CALCIUM SERPL-MCNC: 9.5 MG/DL (ref 8.8–10.2)
CHLORIDE SERPL-SCNC: 103 MMOL/L (ref 98–107)
CREAT SERPL-MCNC: 1.07 MG/DL (ref 0.67–1.17)
DEPRECATED HCO3 PLAS-SCNC: 25 MMOL/L (ref 22–29)
ERYTHROCYTE [DISTWIDTH] IN BLOOD BY AUTOMATED COUNT: 16.7 % (ref 10–15)
GFR SERPL CREATININE-BSD FRML MDRD: 74 ML/MIN/1.73M2
GLUCOSE SERPL-MCNC: 107 MG/DL (ref 70–99)
HCT VFR BLD AUTO: 30.9 % (ref 40–53)
HGB BLD-MCNC: 9.6 G/DL (ref 13.3–17.7)
MCH RBC QN AUTO: 31.9 PG (ref 26.5–33)
MCHC RBC AUTO-ENTMCNC: 31.1 G/DL (ref 31.5–36.5)
MCV RBC AUTO: 103 FL (ref 78–100)
PLATELET # BLD AUTO: 166 10E3/UL (ref 150–450)
POTASSIUM SERPL-SCNC: 4 MMOL/L (ref 3.4–5.3)
PROT SERPL-MCNC: 6 G/DL (ref 6.4–8.3)
RBC # BLD AUTO: 3.01 10E6/UL (ref 4.4–5.9)
SODIUM SERPL-SCNC: 140 MMOL/L (ref 136–145)
WBC # BLD AUTO: 9.4 10E3/UL (ref 4–11)

## 2023-03-27 PROCEDURE — 36415 COLL VENOUS BLD VENIPUNCTURE: CPT

## 2023-03-27 PROCEDURE — 80053 COMPREHEN METABOLIC PANEL: CPT

## 2023-03-27 PROCEDURE — 85027 COMPLETE CBC AUTOMATED: CPT

## 2023-03-27 PROCEDURE — 82248 BILIRUBIN DIRECT: CPT

## 2023-03-27 NOTE — TELEPHONE ENCOUNTER
[facility] asking patient's PCP to fill Nystatin. Request was already sent to refill pool.    De Grace,MELLYN, RN

## 2023-03-30 RX ORDER — NYSTATIN 100000/ML
500000 SUSPENSION, ORAL (FINAL DOSE FORM) ORAL 4 TIMES DAILY PRN
Qty: 400 ML | Refills: 1 | Status: SHIPPED | OUTPATIENT
Start: 2023-03-30 | End: 2023-01-01

## 2023-04-03 ENCOUNTER — LAB (OUTPATIENT)
Dept: LAB | Facility: CLINIC | Age: 73
End: 2023-04-03
Payer: MEDICARE

## 2023-04-03 DIAGNOSIS — R79.89 ABNORMAL LIVER FUNCTION TEST: ICD-10-CM

## 2023-04-03 DIAGNOSIS — C09.9 SQUAMOUS CELL CARCINOMA OF LEFT TONSIL (H): ICD-10-CM

## 2023-04-03 DIAGNOSIS — M19.071 OSTEOARTHRITIS OF RIGHT FOOT, UNSPECIFIED OSTEOARTHRITIS TYPE: ICD-10-CM

## 2023-04-03 DIAGNOSIS — D53.9 MACROCYTIC ANEMIA: ICD-10-CM

## 2023-04-03 LAB
ALBUMIN SERPL BCG-MCNC: 3.9 G/DL (ref 3.5–5.2)
ALP SERPL-CCNC: 70 U/L (ref 40–129)
ALT SERPL W P-5'-P-CCNC: 91 U/L (ref 10–50)
AST SERPL W P-5'-P-CCNC: 44 U/L (ref 10–50)
BILIRUB DIRECT SERPL-MCNC: <0.2 MG/DL (ref 0–0.3)
BILIRUB SERPL-MCNC: 0.5 MG/DL
FOLATE SERPL-MCNC: 18 NG/ML (ref 4.6–34.8)
PROT SERPL-MCNC: 6.1 G/DL (ref 6.4–8.3)
RETICS # AUTO: 0.24 10E6/UL (ref 0.03–0.1)
RETICS/RBC NFR AUTO: 7 % (ref 0.5–2)
VIT B12 SERPL-MCNC: 685 PG/ML (ref 232–1245)

## 2023-04-03 PROCEDURE — 85045 AUTOMATED RETICULOCYTE COUNT: CPT

## 2023-04-03 PROCEDURE — 36415 COLL VENOUS BLD VENIPUNCTURE: CPT

## 2023-04-03 PROCEDURE — 80076 HEPATIC FUNCTION PANEL: CPT

## 2023-04-03 PROCEDURE — 82607 VITAMIN B-12: CPT

## 2023-04-03 PROCEDURE — 82746 ASSAY OF FOLIC ACID SERUM: CPT

## 2023-04-04 RX ORDER — OXYCODONE HYDROCHLORIDE 10 MG/1
5 TABLET ORAL EVERY 6 HOURS PRN
Qty: 60 TABLET | Refills: 0 | Status: SHIPPED | OUTPATIENT
Start: 2023-04-06 | End: 2023-04-18

## 2023-04-10 ENCOUNTER — LAB (OUTPATIENT)
Dept: LAB | Facility: CLINIC | Age: 73
End: 2023-04-10
Payer: MEDICARE

## 2023-04-10 DIAGNOSIS — C79.52 SECONDARY MALIGNANT NEOPLASM OF BONE AND BONE MARROW (H): ICD-10-CM

## 2023-04-10 DIAGNOSIS — C79.51 SECONDARY MALIGNANT NEOPLASM OF BONE AND BONE MARROW (H): ICD-10-CM

## 2023-04-10 DIAGNOSIS — R79.89 ABNORMAL LIVER FUNCTION TEST: ICD-10-CM

## 2023-04-10 DIAGNOSIS — C76.0 MALIGNANT NEOPLASM OF HEAD, FACE, AND NECK (H): ICD-10-CM

## 2023-04-10 DIAGNOSIS — Z79.899 HIGH RISK MEDICATION USE: ICD-10-CM

## 2023-04-10 LAB
ALBUMIN SERPL BCG-MCNC: 3.8 G/DL (ref 3.5–5.2)
ALP SERPL-CCNC: 65 U/L (ref 40–129)
ALT SERPL W P-5'-P-CCNC: 71 U/L (ref 10–50)
ANION GAP SERPL CALCULATED.3IONS-SCNC: 11 MMOL/L (ref 7–15)
AST SERPL W P-5'-P-CCNC: 49 U/L (ref 10–50)
BILIRUB DIRECT SERPL-MCNC: <0.2 MG/DL (ref 0–0.3)
BILIRUB SERPL-MCNC: 0.5 MG/DL
BUN SERPL-MCNC: 20.9 MG/DL (ref 8–23)
CALCIUM SERPL-MCNC: 8.9 MG/DL (ref 8.8–10.2)
CHLORIDE SERPL-SCNC: 105 MMOL/L (ref 98–107)
CREAT SERPL-MCNC: 1.05 MG/DL (ref 0.67–1.17)
DEPRECATED HCO3 PLAS-SCNC: 24 MMOL/L (ref 22–29)
GFR SERPL CREATININE-BSD FRML MDRD: 76 ML/MIN/1.73M2
GLUCOSE SERPL-MCNC: 113 MG/DL (ref 70–99)
POTASSIUM SERPL-SCNC: 4.1 MMOL/L (ref 3.4–5.3)
PROT SERPL-MCNC: 5.8 G/DL (ref 6.4–8.3)
SODIUM SERPL-SCNC: 140 MMOL/L (ref 136–145)

## 2023-04-10 PROCEDURE — 82248 BILIRUBIN DIRECT: CPT

## 2023-04-10 PROCEDURE — 80053 COMPREHEN METABOLIC PANEL: CPT

## 2023-04-10 PROCEDURE — 36415 COLL VENOUS BLD VENIPUNCTURE: CPT

## 2023-04-17 ENCOUNTER — LAB (OUTPATIENT)
Dept: LAB | Facility: CLINIC | Age: 73
End: 2023-04-17
Payer: MEDICARE

## 2023-04-17 DIAGNOSIS — R79.89 ABNORMAL LIVER FUNCTION TEST: ICD-10-CM

## 2023-04-17 DIAGNOSIS — C79.52 SECONDARY MALIGNANT NEOPLASM OF BONE AND BONE MARROW (H): ICD-10-CM

## 2023-04-17 DIAGNOSIS — C79.51 SECONDARY MALIGNANT NEOPLASM OF BONE AND BONE MARROW (H): ICD-10-CM

## 2023-04-17 DIAGNOSIS — C76.0 MALIGNANT NEOPLASM OF HEAD, FACE, AND NECK (H): ICD-10-CM

## 2023-04-17 DIAGNOSIS — Z79.899 HIGH RISK MEDICATION USE: ICD-10-CM

## 2023-04-17 LAB
ALBUMIN SERPL BCG-MCNC: 3.9 G/DL (ref 3.5–5.2)
ALP SERPL-CCNC: 75 U/L (ref 40–129)
ALT SERPL W P-5'-P-CCNC: 65 U/L (ref 10–50)
ANION GAP SERPL CALCULATED.3IONS-SCNC: 11 MMOL/L (ref 7–15)
AST SERPL W P-5'-P-CCNC: 38 U/L (ref 10–50)
BILIRUB DIRECT SERPL-MCNC: <0.2 MG/DL (ref 0–0.3)
BILIRUB SERPL-MCNC: 0.4 MG/DL
BUN SERPL-MCNC: 16.4 MG/DL (ref 8–23)
CALCIUM SERPL-MCNC: 9.6 MG/DL (ref 8.8–10.2)
CHLORIDE SERPL-SCNC: 107 MMOL/L (ref 98–107)
CREAT SERPL-MCNC: 1.13 MG/DL (ref 0.67–1.17)
DEPRECATED HCO3 PLAS-SCNC: 23 MMOL/L (ref 22–29)
GFR SERPL CREATININE-BSD FRML MDRD: 69 ML/MIN/1.73M2
GLUCOSE SERPL-MCNC: 101 MG/DL (ref 70–99)
POTASSIUM SERPL-SCNC: 4.2 MMOL/L (ref 3.4–5.3)
PROT SERPL-MCNC: 6.2 G/DL (ref 6.4–8.3)
SODIUM SERPL-SCNC: 141 MMOL/L (ref 136–145)

## 2023-04-17 PROCEDURE — 80053 COMPREHEN METABOLIC PANEL: CPT

## 2023-04-17 PROCEDURE — 82248 BILIRUBIN DIRECT: CPT

## 2023-04-17 PROCEDURE — 36415 COLL VENOUS BLD VENIPUNCTURE: CPT

## 2023-04-18 ENCOUNTER — TELEPHONE (OUTPATIENT)
Dept: FAMILY MEDICINE | Facility: CLINIC | Age: 73
End: 2023-04-18

## 2023-04-18 DIAGNOSIS — C09.9 SQUAMOUS CELL CARCINOMA OF LEFT TONSIL (H): ICD-10-CM

## 2023-04-18 DIAGNOSIS — G47.01 SLEEP DISORDER DUE TO A GENERAL MEDICAL CONDITION, INSOMNIA TYPE: ICD-10-CM

## 2023-04-18 DIAGNOSIS — M19.071 OSTEOARTHRITIS OF RIGHT FOOT, UNSPECIFIED OSTEOARTHRITIS TYPE: ICD-10-CM

## 2023-04-18 RX ORDER — ZOLPIDEM TARTRATE 12.5 MG/1
TABLET, FILM COATED, EXTENDED RELEASE ORAL
Qty: 30 TABLET | Refills: 0 | Status: SHIPPED | OUTPATIENT
Start: 2023-04-18 | End: 2023-01-01

## 2023-04-18 RX ORDER — ZOLPIDEM TARTRATE 12.5 MG/1
TABLET, FILM COATED, EXTENDED RELEASE ORAL
Qty: 30 TABLET | Refills: 0 | Status: CANCELLED | OUTPATIENT
Start: 2023-04-18

## 2023-04-18 RX ORDER — OXYCODONE HYDROCHLORIDE 10 MG/1
5 TABLET ORAL EVERY 6 HOURS PRN
Qty: 60 TABLET | Refills: 0 | Status: SHIPPED | OUTPATIENT
Start: 2023-04-18 | End: 2023-04-19

## 2023-04-18 NOTE — TELEPHONE ENCOUNTER
Patient did not READ his instructions on his bottle of oxyCODONE IR (ROXICODONE) 10 MG tablet  when first given.  He will be out of medication at the end of the week.  He did not realize that he was to take a 1/2 tablet 4x daily.    Patient will need a refill for minimum of  2 weeks for these NEW directions.    Patient also would like medication to help with sleep, zolpidem ER (AMBIEN CR) 12.5 MG CR tablet.  He stopped taking this but is finding it REALLY hard to sleep at night and would like to start taking this again.    Patient had to reschedule the appointment for today because provider was/is out of office.    Bri MELLOO/[employer]

## 2023-04-18 NOTE — TELEPHONE ENCOUNTER
Prior Authorization Retail Medication Request    Medication/Dose: Zolpidem tartrate er 12.5mg  ICD code (if different than what is on RX):    Previously Tried and Failed:    Rationale:      Insurance Name:  Medicarebl RX PDP part D  Insurance ID:  [id number]      Pharmacy Information (if different than what is on RX)  Name:  [facility]  Phone:  [phone]

## 2023-04-18 NOTE — TELEPHONE ENCOUNTER
When patient is rescheduled I can sent to get him to that appointment with current dosing until seen. Please let me know when that will be.

## 2023-04-19 ENCOUNTER — OFFICE VISIT (OUTPATIENT)
Dept: FAMILY MEDICINE | Facility: CLINIC | Age: 73
End: 2023-04-19
Payer: MEDICARE

## 2023-04-19 ENCOUNTER — TELEPHONE (OUTPATIENT)
Dept: FAMILY MEDICINE | Facility: CLINIC | Age: 73
End: 2023-04-19

## 2023-04-19 VITALS
WEIGHT: 209 LBS | RESPIRATION RATE: 20 BRPM | TEMPERATURE: 97.4 F | OXYGEN SATURATION: 98 % | BODY MASS INDEX: 32.73 KG/M2 | HEART RATE: 78 BPM | DIASTOLIC BLOOD PRESSURE: 74 MMHG | SYSTOLIC BLOOD PRESSURE: 118 MMHG

## 2023-04-19 DIAGNOSIS — M19.071 OSTEOARTHRITIS OF RIGHT FOOT, UNSPECIFIED OSTEOARTHRITIS TYPE: ICD-10-CM

## 2023-04-19 DIAGNOSIS — D64.9 ANEMIA, UNSPECIFIED TYPE: ICD-10-CM

## 2023-04-19 DIAGNOSIS — T50.905A DRUG-INDUCED HEPATITIS: ICD-10-CM

## 2023-04-19 DIAGNOSIS — J32.0 CHRONIC MAXILLARY SINUSITIS: ICD-10-CM

## 2023-04-19 DIAGNOSIS — N18.30 STAGE 3 CHRONIC KIDNEY DISEASE, UNSPECIFIED WHETHER STAGE 3A OR 3B CKD (H): ICD-10-CM

## 2023-04-19 DIAGNOSIS — F11.20 UNCOMPLICATED OPIOID DEPENDENCE (H): ICD-10-CM

## 2023-04-19 DIAGNOSIS — C61 MALIGNANT NEOPLASM OF PROSTATE (H): ICD-10-CM

## 2023-04-19 DIAGNOSIS — K71.6 DRUG-INDUCED HEPATITIS: ICD-10-CM

## 2023-04-19 DIAGNOSIS — C09.9 SQUAMOUS CELL CARCINOMA OF LEFT TONSIL (H): ICD-10-CM

## 2023-04-19 DIAGNOSIS — G47.01 SLEEP DISORDER DUE TO A GENERAL MEDICAL CONDITION, INSOMNIA TYPE: Primary | ICD-10-CM

## 2023-04-19 PROCEDURE — 99214 OFFICE O/P EST MOD 30 MIN: CPT | Performed by: STUDENT IN AN ORGANIZED HEALTH CARE EDUCATION/TRAINING PROGRAM

## 2023-04-19 RX ORDER — RAMELTEON 8 MG/1
8 TABLET ORAL AT BEDTIME
Qty: 30 TABLET | Refills: 0 | Status: SHIPPED | OUTPATIENT
Start: 2023-04-19 | End: 2023-01-01

## 2023-04-19 RX ORDER — OXYCODONE HYDROCHLORIDE 5 MG/1
5-10 TABLET ORAL EVERY 6 HOURS PRN
Qty: 120 TABLET | Refills: 0 | Status: SHIPPED | OUTPATIENT
Start: 2023-04-19 | End: 2023-01-01

## 2023-04-19 NOTE — PROGRESS NOTES
Assessment & Plan     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, insomnia type  Sleep hygiene again discussed in detail today.  He has struggled while making changes and much is anxiety as well as chronic pain related.  Given insurance change will transition to ramelteon and limit to as needed use as much as possible.  We will start with half a tablet to make sure he tolerates.  Potential side effects and interactions with alcohol and other medications discussed again in detail today.  He is understanding of risk and previous case reports.  Follow-up in 2 to 3 months.  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  Can still use 5 to 10 mg 4 times daily.  Again long-term consequences and sequelae discussed along with possible side effects and interactions.  He does have follow-up with oncology upcoming.  They continue to monitor his liver function and he will have a repeat PET scan at follow-up.  They are considering continued immunotherapy in the future.  - oxyCODONE (ROXICODONE) 5 MG tablet  Dispense: 120 tablet; Refill: 0    Osteoarthritis of right foot, unspecified osteoarthritis type  - oxyCODONE (ROXICODONE) 5 MG tablet  Dispense: 120 tablet; Refill: 0    Stage 3 chronic kidney disease, unspecified whether stage 3a or 3b CKD (H)    Malignant neoplasm of prostate (H)    Chronic maxillary sinusitis    Anemia  Previously hemoglobin decreased.  Normal folic acid and B12 studies.  Has completed chemo now.  Repeat hemoglobin at this time.  Recommend he have colonoscopy done in the future.    Drug-induced hepatitis  Continue weekly enzyme monitoring with [facility] and currently on 20 mg prednisone with plan to taper.     Monico Rivers MD  [facility]    Claribel Wolff is a 72 year old, presenting for the  following health issues:  Recheck Medication        4/19/2023    10:59 AM   Additional Questions   Roomed by Gretta Hernandez     History of Present Illness       Reason for visit:  Follow up regarding chemotherapy    He eats 2-3 servings of fruits and vegetables daily.He consumes 1 sweetened beverage(s) daily.He exercises with enough effort to increase his heart rate 20 to 29 minutes per day.  He exercises with enough effort to increase his heart rate 3 or less days per week.   He is taking medications regularly.       Completed 6 cycles of chemo with [facility] and was on pembrolizumab previously prior to drug-induced hepatitis.  Continues to follow with monitoring and has 1 month follow-up with them and PET scan.  Has not done well with sleep since discontinue his Ambien previously and now reports eating 2 to 3 hours per night.  Has tried self help with relaxation techniques as well as sleep hygiene without much improvement.  Was feeling very weak on higher doses of prednisone but has been better with decrease.      Review of Systems   Constitutional, HEENT, cardiovascular, pulmonary, GI, , musculoskeletal, neuro, skin, endocrine and psych systems are negative, except as otherwise noted.      Objective    /74   Pulse 78   Temp 97.4  F (36.3  C) (Temporal)   Resp 20   Wt 94.8 kg (209 lb)   SpO2 98%   BMI 32.73 kg/m    Body mass index is 32.73 kg/m .  Physical Exam   GENERAL: healthy, alert and no distress  EYES: Eyes grossly normal to inspection, PERRL and conjunctivae and sclerae normal  RESP: lungs clear to auscultation - no rales, rhonchi or wheezes  CV: regular rate and rhythm, normal S1 S2, no S3 or S4, no murmur, click or rub, no peripheral edema and peripheral pulses strong  MS: no gross musculoskeletal defects noted, no edema  SKIN: no suspicious lesions or rashes  NEURO: mentation intact and speech normal  PSYCH: mentation appears normal, affect normal/bright

## 2023-04-19 NOTE — TELEPHONE ENCOUNTER
What was the reason for the antibiotic. I did see him today but this was not discussed. He is now off of chemo currently.

## 2023-04-19 NOTE — TELEPHONE ENCOUNTER
I called [facility] and he stated [facility] put him on the antibiotic because he was put on Prednisone.  They were concerned about his immune system.  He stated the tablets work well for him.

## 2023-04-19 NOTE — TELEPHONE ENCOUNTER
Pt was getting Bactrim from the [facility], but has stopped treatment there and will need a refill of this. Pt uses sulfamethoxazole/tmp 800-160 mg one tablet once daily. Please send rx if appropriate or contact pt if cannot.    Thank you,  Rubens Lares Pharm.D.  [facility]  [phone]

## 2023-04-21 RX ORDER — SULFAMETHOXAZOLE/TRIMETHOPRIM 800-160 MG
1 TABLET ORAL DAILY
Qty: 30 TABLET | Refills: 1 | Status: CANCELLED | OUTPATIENT
Start: 2023-04-21

## 2023-04-21 NOTE — TELEPHONE ENCOUNTER
FYI - Status Update: IN REGARDS TO HIS REQUEST FOR BACTRIM    Who is Calling: patient    Update: Patient calling on status of Bactrim prescription. Patient stating [facility] had prescribed this as a prophylactic while he is on prednisone and compromised immune system.   Patient was not put on this due to his chemo but due to being on the prednisone. Patient stating he continues to be weaned down on dosage of prednisone with the improving kidney function, presently taking 20 mg daily.     As his labs continue to improve they will continue to decrease his dosing of his prednisone.    [facility] had instructed patient to have his primary take over his refills as he is done with chemo treatments through [facility] at this time.  Patient will try to reach out to [facility] for them to send records on why he was prescribed the Bactrim as well. Fax number given.       Does caller want a call/response back: Yes     Could we send this information to you in [facility] or would you prefer to receive a phone call?:   Patient would prefer a phone call   Okay to leave a detailed message?: Yes at Cell number on file:    Telephone Information:   Mobile [phone]

## 2023-04-21 NOTE — TELEPHONE ENCOUNTER
Patient calling with update that he spoke with [facility] and they will continue to fill his Bactrim as they are still monitoring his labs and decreasing his prednisone. Alina Michael LPN

## 2023-04-21 NOTE — TELEPHONE ENCOUNTER
PA Initiation    Medication: Zolpidem tartrate er 12.5mg - PA INITIATED  Insurance Company: [employer] - Phone [phone] Fax [phone]  Pharmacy Filling the Rx: [place] [facility] [id number]  Filling Pharmacy Phone: [phone]  Start Date: 4/21/2023

## 2023-04-25 ENCOUNTER — OFFICE VISIT (OUTPATIENT)
Dept: CARDIOLOGY | Facility: CLINIC | Age: 73
End: 2023-04-25
Payer: MEDICARE

## 2023-04-25 VITALS
SYSTOLIC BLOOD PRESSURE: 108 MMHG | OXYGEN SATURATION: 97 % | HEIGHT: 67 IN | DIASTOLIC BLOOD PRESSURE: 68 MMHG | HEART RATE: 74 BPM | BODY MASS INDEX: 33.57 KG/M2 | WEIGHT: 213.9 LBS

## 2023-04-25 DIAGNOSIS — E78.5 HYPERLIPIDEMIA LDL GOAL <130: ICD-10-CM

## 2023-04-25 DIAGNOSIS — D64.9 ANEMIA, UNSPECIFIED TYPE: ICD-10-CM

## 2023-04-25 DIAGNOSIS — C76.0 MALIGNANT NEOPLASM OF HEAD, FACE, AND NECK (H): ICD-10-CM

## 2023-04-25 DIAGNOSIS — I25.10 CORONARY ARTERY DISEASE INVOLVING NATIVE CORONARY ARTERY OF NATIVE HEART WITHOUT ANGINA PECTORIS: ICD-10-CM

## 2023-04-25 DIAGNOSIS — I10 BENIGN ESSENTIAL HYPERTENSION: ICD-10-CM

## 2023-04-25 LAB
ALBUMIN SERPL BCG-MCNC: 4 G/DL (ref 3.5–5.2)
ALP SERPL-CCNC: 70 U/L (ref 40–129)
ALT SERPL W P-5'-P-CCNC: 48 U/L (ref 10–50)
ANION GAP SERPL CALCULATED.3IONS-SCNC: 12 MMOL/L (ref 7–15)
AST SERPL W P-5'-P-CCNC: 31 U/L (ref 10–50)
BILIRUB DIRECT SERPL-MCNC: <0.2 MG/DL (ref 0–0.3)
BILIRUB SERPL-MCNC: 0.4 MG/DL
BUN SERPL-MCNC: 20.1 MG/DL (ref 8–23)
CALCIUM SERPL-MCNC: 9.1 MG/DL (ref 8.8–10.2)
CHLORIDE SERPL-SCNC: 106 MMOL/L (ref 98–107)
CHOLEST SERPL-MCNC: 162 MG/DL
CREAT SERPL-MCNC: 1.1 MG/DL (ref 0.67–1.17)
DEPRECATED HCO3 PLAS-SCNC: 22 MMOL/L (ref 22–29)
ERYTHROCYTE [DISTWIDTH] IN BLOOD BY AUTOMATED COUNT: 13.3 % (ref 10–15)
GFR SERPL CREATININE-BSD FRML MDRD: 72 ML/MIN/1.73M2
GLUCOSE SERPL-MCNC: 122 MG/DL (ref 70–99)
HCT VFR BLD AUTO: 39.1 % (ref 40–53)
HDLC SERPL-MCNC: 81 MG/DL
HGB BLD-MCNC: 12.7 G/DL (ref 13.3–17.7)
LDLC SERPL CALC-MCNC: 65 MG/DL
MCH RBC QN AUTO: 32.1 PG (ref 26.5–33)
MCHC RBC AUTO-ENTMCNC: 32.5 G/DL (ref 31.5–36.5)
MCV RBC AUTO: 99 FL (ref 78–100)
NONHDLC SERPL-MCNC: 81 MG/DL
PLATELET # BLD AUTO: 159 10E3/UL (ref 150–450)
POTASSIUM SERPL-SCNC: 4.6 MMOL/L (ref 3.4–5.3)
PROT SERPL-MCNC: 6 G/DL (ref 6.4–8.3)
RBC # BLD AUTO: 3.96 10E6/UL (ref 4.4–5.9)
SODIUM SERPL-SCNC: 140 MMOL/L (ref 136–145)
TRIGL SERPL-MCNC: 79 MG/DL
WBC # BLD AUTO: 6.4 10E3/UL (ref 4–11)

## 2023-04-25 PROCEDURE — 85027 COMPLETE CBC AUTOMATED: CPT | Performed by: INTERNAL MEDICINE

## 2023-04-25 PROCEDURE — 99214 OFFICE O/P EST MOD 30 MIN: CPT | Performed by: INTERNAL MEDICINE

## 2023-04-25 PROCEDURE — 80061 LIPID PANEL: CPT | Performed by: INTERNAL MEDICINE

## 2023-04-25 PROCEDURE — 80053 COMPREHEN METABOLIC PANEL: CPT | Performed by: INTERNAL MEDICINE

## 2023-04-25 PROCEDURE — 82248 BILIRUBIN DIRECT: CPT | Performed by: INTERNAL MEDICINE

## 2023-04-25 PROCEDURE — 36415 COLL VENOUS BLD VENIPUNCTURE: CPT | Performed by: INTERNAL MEDICINE

## 2023-04-25 NOTE — PROGRESS NOTES
General Cardiology Clinic Progress Note  Quan Murphy MRN# [id number]   YOB: 1950 Age: 72 year old       Reason for visit: Reevaluation of coronary artery disease and cardiac risk factors    History of presenting illness:       I had the opportunity to see Mr. Quan Murphy in Cardiology Clinic today at [facility] in [place] for reevaluation of coronary artery disease and cardiac risk factors including hypertension and dyslipidemia.       He is a 72-year-old gentleman who had left shoulder and left arm discomfort in 2016 and found to have severe proximal LAD disease, treated with angioplasty and stenting.  Since then, he has done very well from a cardiac standpoint.      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  He recently had a recurrence in the back of his throat with metastasis to a rib.  He then underwent another series of chemotherapy treatments at [facility] and is hoping for success with that treatment.    He developed some anemia and elevated liver function test while on chemotherapy.  Fortunately those issues are improving rapidly.  He has not had any cardiac complications.  He is weak and short of breath with activity but has been on high-dose prednisone for quite a while which is likely the cause of that issue.  His echocardiogram this year looked essentially normal.  His function is normal and he has no significant valvular disease.  His last nuclear stress test from a year ago was normal.    I reviewed his labs with him today.  His basic metabolic panel is normal.  His liver function tests are excellent.  His liver function ALT has normalized.          Assessment and Plan:     ASSESSMENT:    Mr. Quan Murphy is a really pleasant 72-year-old gentleman with hypertension, dyslipidemia, and coronary artery disease with a history of stenting of his LAD back in 2016.  His echocardiogram is  normal and his last stress test a year ago showed no evidence of ischemia or infarction.  His risk factors are under excellent control, including his blood pressure and cholesterol numbers.    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  I am glad to see that he is recovering from that and seems to be improving again.    His cardiac medications appear to be appropriately adjusted and I will not make any changes.  I will plan to see him back again in 1 year for reevaluation.    Aramis Ch MD           Orders this Visit:  Orders Placed This Encounter   Procedures     Bilirubin direct     Basic metabolic panel     CBC with platelets     Lipid Profile     ALT     Follow-Up with Cardiology     Echocardiogram Complete     No orders of the defined types were placed in this encounter.    There are no discontinued medications.    Today's clinic visit entailed:  Review of the result(s) of each unique test - echo, nuclear stress test, BMP, ALT, flp  Ordering of each unique test  Prescription drug management  30 minutes spent by me on the date of the encounter doing chart review, review of test results, patient visit and documentation   Provider  Link to [facility] Help Grid     The level of medical decision making during this visit was of moderate complexity.           Review of Systems:     Review of Systems:  Skin:        Eyes:       ENT:       Respiratory:  Positive for shortness of breath;dyspnea on exertion;cough  Cardiovascular:  Negative;palpitations;chest pain;lightheadedness;dizziness;syncope or near-syncope Positive for;edema  Gastroenterology:      Genitourinary:       Musculoskeletal:       Neurologic:  Negative numbness or tingling of feet;numbness or tingling of hands  Psychiatric:       Heme/Lymph/Imm:  Positive for allergies  Endocrine:                 Physical Exam:     Vitals: /68 (BP Location: Right arm, Patient  "Position: Sitting, Cuff Size: Adult Large)   Pulse 74   Ht 1.702 m (5' 7\")   Wt 97 kg (213 lb 14.4 oz)   SpO2 97%   BMI 33.50 kg/m    Constitutional: Well nourished and in no apparent distress.  Eyes: Pupils equal, round. Sclerae anicteric.   HEENT: Normocephalic, atraumatic.   Neck: Supple. JVD normal  Respiratory: Breathing non-labored. Lungs clear to auscultation bilaterally. No crackles, wheezes, rhonchi, or rales.  Cardiovascular:  Regular rate and rhythm, normal S1 and S2. No murmur, rub, or gallop.  Skin: Warm, dry. No rashes, cyanosis, or xanthelasma.  Extremities: No edema.  Neurologic: No gross motor deficits. Alert, awake, and oriented to person, place and time.  Psychiatric: Affect appropriate.             Medications:     Current Outpatient Medications   Medication Sig Dispense Refill     cetirizine (ZYRTEC) 10 MG tablet Take 10 mg by mouth daily       citalopram (CELEXA) 40 MG tablet TAKE 1 TABLET (40 MG) BY MOUTH DAILY 90 tablet 1     DULoxetine (CYMBALTA) 60 MG capsule Take 1 capsule (60 mg) by mouth daily 90 capsule 3     levothyroxine (SYNTHROID/LEVOTHROID) 150 MCG tablet Take 1 tablet (150 mcg) by mouth daily 30 tablet 0     metoprolol succinate ER (TOPROL XL) 50 MG 24 hr tablet Take 1 tablet (50 mg) by mouth daily 90 tablet 3     nystatin (MYCOSTATIN) 833665 UNIT/ML suspension Swish and swallow 5 mLs (500,000 Units) in mouth 4 times daily as needed (thrush) 400 mL 1     omeprazole (PRILOSEC) 40 MG DR capsule Take 1 capsule (40 mg) by mouth daily 90 capsule 3     oxyCODONE (ROXICODONE) 5 MG tablet Take 1-2 tablets (5-10 mg) by mouth every 6 hours as needed for severe pain 120 tablet 0     pilocarpine (SALAGEN) 7.5 MG tablet Take 1 tablet (7.5 mg) by mouth 3 times daily 90 tablet 0     predniSONE (DELTASONE) 20 MG tablet Take 50 mg by mouth       pregabalin (LYRICA) 150 MG capsule Take 1 capsule (150 mg) by mouth 2 times daily 120 capsule 1     ramelteon (ROZEREM) 8 MG tablet Take 1 tablet (8 " mg) by mouth At Bedtime 30 tablet 0     rosuvastatin (CRESTOR) 40 MG tablet Take 1 tablet (40 mg) by mouth daily 90 tablet 3     ALPRAZolam (XANAX) 0.5 MG tablet Take 1 tablet (0.5 mg) by mouth daily as needed for anxiety (Patient not taking: Reported on 4/19/2023) 10 tablet 0     cevimeline (EVOXAC) 30 MG capsule Take 1 capsule (30 mg) by mouth 3 times daily (Patient not taking: Reported on 3/9/2023) 120 capsule 11     naloxone (NARCAN) 4 MG/0.1ML nasal spray Spray 1 spray (4 mg) into one nostril alternating nostrils as needed for opioid reversal every 2-3 minutes until assistance arrives (Patient not taking: Reported on 4/19/2023) 0.2 mL 0     nitroGLYcerin (NITROSTAT) 0.4 MG sublingual tablet For chest pain place 1 tablet under the tongue every 5 minutes for 3 doses. If symptoms persist 5 minutes after 1st dose call 911. (Patient not taking: Reported on 4/19/2023) 25 tablet 0     ondansetron (ZOFRAN ODT) 4 MG ODT tab Take 1 tablet (4 mg) by mouth every 8 hours as needed for nausea (Patient not taking: Reported on 4/19/2023) 30 tablet 1     prochlorperazine (COMPAZINE) 10 MG tablet  (Patient not taking: Reported on 4/19/2023)       zolpidem ER (AMBIEN CR) 12.5 MG CR tablet TAKE ONE TABLET BY MOUTH NIGHTLY AS NEEDED FOR SLEEP. MUST LAST 30 DAYS (Patient not taking: Reported on 4/19/2023) 30 tablet 0       Family History   Problem Relation Age of Onset     Hypertension Father         Lived to age 87     Connective Tissue Disorder Mother         LUPUS     Heart Disease Mother         Valve replacement     Anxiety Disorder Mother         Lived to age 84     Dementia Mother         Nursing Home (lived to age 86)       Social History     Socioeconomic History     Marital status:      Spouse name: Alessandra     Number of children: 2     Years of education: Not on file     Highest education level: Not on file   Occupational History     Occupation: Retired   Tobacco Use     Smoking status: Never     Smokeless tobacco:  Never   Vaping Use     Vaping status: Never Used   Substance and Sexual Activity     Alcohol use: Yes     Alcohol/week: 10.0 standard drinks of alcohol     Types: 10 Cans of beer per week     Comment: 1 hard  liquid and 1 wine or beer per day     Drug use: No     Sexual activity: Yes     Partners: Female     Birth control/protection: Female Surgical   Other Topics Concern      Service Not Asked     Blood Transfusions Not Asked     Caffeine Concern Not Asked     Occupational Exposure Not Asked     Hobby Hazards Not Asked     Sleep Concern Not Asked     Stress Concern Not Asked     Weight Concern Not Asked     Special Diet Not Asked     Back Care Not Asked     Exercise Not Asked     Bike Helmet Not Asked     Seat Belt Not Asked     Self-Exams Not Asked     Parent/sibling w/ CABG, MI or angioplasty before 65F 55M? No   Social History Narrative     Not on file     Social Determinants of Health     Financial Resource Strain: Not on file   Food Insecurity: Not on file   Transportation Needs: Not on file   Physical Activity: Not on file   Stress: Not on file   Social Connections: Not on file   Intimate Partner Violence: Not on file   Housing Stability: Not on file            Past Medical History:     Past Medical History:   Diagnosis Date     Allergic rhinitis      Allergy, unspecified not elsewhere classified     Seasonal allergies, pollen, dust, smoke and animals     Antiplatelet or antithrombotic long-term use      Anxiety      Arthritis      Chest pain      Chronic sinusitis      Coronary atherosclerosis of unspecified type of vessel, native or graft     Coronary artery disease     Depressive disorder 1995     Gastroesophageal reflux disease 2020    Cleared with medication     Head injury 1954     Hiatal hernia 2015    Right Side     History of blood transfusion 12/15/2004    Prostate Surgery - My own blood     Hyperlipidemia      Hypertension      Inguinal hernia      Kidney problem 10/08/2017    Lithotripsy      Kidney stones      Malignant neoplasm of prostate (H)     Prostate cancer     Prostate cancer (H)      Syncope      Tonsil cancer (H)               Past Surgical History:     Past Surgical History:   Procedure Laterality Date     ARTHRODESIS FOOT  7/23/2013    Procedure: ARTHRODESIS FOOT;  Great Toe Arthrodesis Left Foot;  Surgeon: Ash Gonzalez DPM;  Location: [facility]     ARTHRODESIS FOOT  6/10/2014    Procedure: ARTHRODESIS FOOT;  Surgeon: Ash Gonzalez DPM;  Location: [facility]     BIOPSY  10/1/2004    dermatologist biopsies     COLONOSCOPY  10/7/2013    Procedure: COLONOSCOPY;  Colonoscopy;  Surgeon: Mike Fallon MD;  Location: [facility]     CYSTOSCOPY N/A 2/16/2022    Procedure: CYSTOSCOPY and bladder stone removal;  Surgeon: David Rogers MD;  Location: [facility]     ESOPHAGOSCOPY, GASTROSCOPY, DUODENOSCOPY (EGD), COMBINED N/A 2/12/2020    Procedure: ESOPHAGOGASTRODUODENOSCOPY (EGD);  Surgeon: Sam Escobar MD;  Location: [facility]     EXTRACORPOREAL SHOCK WAVE LITHOTRIPSY (ESWL) Bilateral 10/18/2017    Procedure: EXTRACORPOREAL SHOCK WAVE LITHOTRIPSY (ESWL);  BILATERAL EXTRACORPOREAL SHOCKWAVE LITHOTRIPSY ;  Surgeon: Meir Torres MD;  Location: [facility] OR     HC CORRECT BUNION,SIMPLE  08/11/2005    x3     HC REMV TOE BENIGN BONE LESN  08/11/2005     HEAD & NECK SURGERY  1954    From a fall     HERNIORRHAPHY INGUINAL  7/3/2013    Procedure: HERNIORRHAPHY INGUINAL;  Open Repair Inguinal hernia Right with mesh ;  Surgeon: Sam Escobar MD;  Location: [facility]     IR GASTROSTOMY TUBE PERCUTANEOUS PLCMNT  6/7/2021     MOHS MICROGRAPHIC PROCEDURE  08/23/11    [facility]     OPEN REDUCTION INTERNAL FIXATION WRIST Right 7/18/2017    Procedure: OPEN REDUCTION INTERNAL FIXATION WRIST;  Right distal radius open reduction and internal fixation;  Surgeon: Pedro Blanca DO;  Location: [facility]     RECONSTRUCT FOREFOOT WITH METATARSOPHALANGEAL (MTP) FUSION  6/10/2014     Procedure: RECONSTRUCT FOREFOOT WITH METATARSOPHALANGEAL (MTP) FUSION;  Surgeon: Ash Gonzalez DPM;  Location: [facility]     STENT, CORONARY, DEMI       SURGICAL HISTORY OF -   1999/1974    lt knee     SURGICAL HISTORY OF -   10/2004    lithotripsy     SURGICAL HISTORY OF -   11/05    angiogram with stent     VASCULAR SURGERY  11/17/2005    Puncture of iliac artery during and angiogram     RUST LAPAROSCOPY, SURGICAL PROSTATECTOMY, RETROPUBIC RADICAL, W/NERVE SPARING  11/30/2004    With full bilateral pelvic lymphadenectomy.  [facility].     RUST TOTAL KNEE ARTHROPLASTY  05/01/08    Left knee              Allergies:   Animal dander, Azithromycin, Dust mites, Pollen extract, and Smoke.       Data:   All laboratory data reviewed:    Recent Labs   Lab Test 04/25/23  1325 12/12/22  1229 11/28/22  1108 10/27/22  1009 08/16/22  1315 05/13/22  1634 11/15/21  1106   LDL 65  --   --  48  --   --  65   HDL 81  --   --  47  --   --  51   NHDL 81  --   --  77  --   --  105   CHOL 162  --   --  124  --   --  156   TRIG 79  --   --  147  --   --  200*   TSH  --  3.39 3.96  --  0.06*   < >  --     < > = values in this interval not displayed.       Lab Results   Component Value Date    WBC 6.4 04/25/2023    WBC 6.6 07/02/2021    RBC 3.96 (L) 04/25/2023    RBC 3.25 (L) 07/02/2021    HGB 12.7 (L) 04/25/2023    HGB 9.3 (L) 07/02/2021    HCT 39.1 (L) 04/25/2023    HCT 29.4 (L) 07/02/2021    MCV 99 04/25/2023    MCV 91 07/02/2021    MCH 32.1 04/25/2023    MCH 28.6 07/02/2021    MCHC 32.5 04/25/2023    MCHC 31.6 07/02/2021    RDW 13.3 04/25/2023    RDW 16.3 (H) 07/02/2021     04/25/2023     07/02/2021       Lab Results   Component Value Date     04/25/2023     07/02/2021    POTASSIUM 4.6 04/25/2023    POTASSIUM 4.3 12/15/2022    POTASSIUM 4.1 07/02/2021    CHLORIDE 106 04/25/2023    CHLORIDE 101 12/15/2022    CHLORIDE 100 07/02/2021    CO2 22 04/25/2023    CO2 26 12/15/2022    CO2 29 07/02/2021    ANIONGAP 12  04/25/2023    ANIONGAP 6 12/15/2022    ANIONGAP 6 07/02/2021     (H) 04/25/2023    GLC 93 12/15/2022     (H) 07/02/2021    BUN 20.1 04/25/2023    BUN 10 12/15/2022    BUN 23 07/02/2021    CR 1.10 04/25/2023    CR 1.14 07/02/2021    GFRESTIMATED 72 04/25/2023    GFRESTIMATED 54 (L) 08/19/2022    GFRESTIMATED 65 07/02/2021    GFRESTBLACK 75 07/02/2021    LATRELL 9.1 04/25/2023    LATRELL 8.5 07/02/2021      Lab Results   Component Value Date    AST 31 04/25/2023    AST 34 07/02/2021    ALT 48 04/25/2023    ALT 37 07/02/2021       No results found for: A1C    Lab Results   Component Value Date    INR 0.98 09/14/2022    INR 1.49 (H) 06/07/2021    INR 1.08 10/28/2017         AIDEN ALEXANDRE MD  [facility]

## 2023-04-25 NOTE — LETTER
4/25/2023    Monico Rivers MD  [address] [place] [address]    RE: Quan Murphy       Dear Colleague,     I had the pleasure of seeing Quan Murphy in the [facility].    General Cardiology Clinic Progress Note  Quan Murphy MRN# [id number]   YOB: 1950 Age: 72 year old       Reason for visit: Reevaluation of coronary artery disease and cardiac risk factors    History of presenting illness:       I had the opportunity to see Mr. Quan Murphy in Cardiology Clinic today at [facility] in [place] for reevaluation of coronary artery disease and cardiac risk factors including hypertension and dyslipidemia.       He is a 72-year-old gentleman who had left shoulder and left arm discomfort in 2016 and found to have severe proximal LAD disease, treated with angioplasty and stenting.  Since then, he has done very well from a cardiac standpoint.      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  He recently had a recurrence in the back of his throat with metastasis to a rib.  He then underwent another series of chemotherapy treatments at [facility] and is hoping for success with that treatment.    He developed some anemia and elevated liver function test while on chemotherapy.  Fortunately those issues are improving rapidly.  He has not had any cardiac complications.  He is weak and short of breath with activity but has been on high-dose prednisone for quite a while which is likely the cause of that issue.  His echocardiogram this year looked essentially normal.  His function is normal and he has no significant valvular disease.  His last nuclear stress test from a year ago was normal.    I reviewed his labs with him today.  His basic metabolic panel is normal.  His liver function tests are excellent.  His liver function ALT has normalized.          Assessment and Plan:      ASSESSMENT:    Mr. Quan Murphy is a really pleasant 72-year-old gentleman with hypertension, dyslipidemia, and coronary artery disease with a history of stenting of his LAD back in 2016.  His echocardiogram is normal and his last stress test a year ago showed no evidence of ischemia or infarction.  His risk factors are under excellent control, including his blood pressure and cholesterol numbers.    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  I am glad to see that he is recovering from that and seems to be improving again.    His cardiac medications appear to be appropriately adjusted and I will not make any changes.  I will plan to see him back again in 1 year for reevaluation.    Aramis Ch MD           Orders this Visit:  Orders Placed This Encounter   Procedures    Bilirubin direct    Basic metabolic panel    CBC with platelets    Lipid Profile    ALT    Follow-Up with Cardiology    Echocardiogram Complete     No orders of the defined types were placed in this encounter.    There are no discontinued medications.    Today's clinic visit entailed:  Review of the result(s) of each unique test - echo, nuclear stress test, BMP, ALT, flp  Ordering of each unique test  Prescription drug management  30 minutes spent by me on the date of the encounter doing chart review, review of test results, patient visit and documentation   Provider  Link to [facility] Help Grid     The level of medical decision making during this visit was of moderate complexity.           Review of Systems:     Review of Systems:  Skin:        Eyes:       ENT:       Respiratory:  Positive for shortness of breath;dyspnea on exertion;cough  Cardiovascular:  Negative;palpitations;chest pain;lightheadedness;dizziness;syncope or near-syncope Positive for;edema  Gastroenterology:      Genitourinary:       Musculoskeletal:       Neurologic:  Negative numbness or  "tingling of feet;numbness or tingling of hands  Psychiatric:       Heme/Lymph/Imm:  Positive for allergies  Endocrine:                 Physical Exam:     Vitals: /68 (BP Location: Right arm, Patient Position: Sitting, Cuff Size: Adult Large)   Pulse 74   Ht 1.702 m (5' 7\")   Wt 97 kg (213 lb 14.4 oz)   SpO2 97%   BMI 33.50 kg/m    Constitutional: Well nourished and in no apparent distress.  Eyes: Pupils equal, round. Sclerae anicteric.   HEENT: Normocephalic, atraumatic.   Neck: Supple. JVD normal  Respiratory: Breathing non-labored. Lungs clear to auscultation bilaterally. No crackles, wheezes, rhonchi, or rales.  Cardiovascular:  Regular rate and rhythm, normal S1 and S2. No murmur, rub, or gallop.  Skin: Warm, dry. No rashes, cyanosis, or xanthelasma.  Extremities: No edema.  Neurologic: No gross motor deficits. Alert, awake, and oriented to person, place and time.  Psychiatric: Affect appropriate.             Medications:     Current Outpatient Medications   Medication Sig Dispense Refill    cetirizine (ZYRTEC) 10 MG tablet Take 10 mg by mouth daily      citalopram (CELEXA) 40 MG tablet TAKE 1 TABLET (40 MG) BY MOUTH DAILY 90 tablet 1    DULoxetine (CYMBALTA) 60 MG capsule Take 1 capsule (60 mg) by mouth daily 90 capsule 3    levothyroxine (SYNTHROID/LEVOTHROID) 150 MCG tablet Take 1 tablet (150 mcg) by mouth daily 30 tablet 0    metoprolol succinate ER (TOPROL XL) 50 MG 24 hr tablet Take 1 tablet (50 mg) by mouth daily 90 tablet 3    nystatin (MYCOSTATIN) 971720 UNIT/ML suspension Swish and swallow 5 mLs (500,000 Units) in mouth 4 times daily as needed (thrush) 400 mL 1    omeprazole (PRILOSEC) 40 MG DR capsule Take 1 capsule (40 mg) by mouth daily 90 capsule 3    oxyCODONE (ROXICODONE) 5 MG tablet Take 1-2 tablets (5-10 mg) by mouth every 6 hours as needed for severe pain 120 tablet 0    pilocarpine (SALAGEN) 7.5 MG tablet Take 1 tablet (7.5 mg) by mouth 3 times daily 90 tablet 0    predniSONE " (DELTASONE) 20 MG tablet Take 50 mg by mouth      pregabalin (LYRICA) 150 MG capsule Take 1 capsule (150 mg) by mouth 2 times daily 120 capsule 1    ramelteon (ROZEREM) 8 MG tablet Take 1 tablet (8 mg) by mouth At Bedtime 30 tablet 0    rosuvastatin (CRESTOR) 40 MG tablet Take 1 tablet (40 mg) by mouth daily 90 tablet 3    ALPRAZolam (XANAX) 0.5 MG tablet Take 1 tablet (0.5 mg) by mouth daily as needed for anxiety (Patient not taking: Reported on 4/19/2023) 10 tablet 0    cevimeline (EVOXAC) 30 MG capsule Take 1 capsule (30 mg) by mouth 3 times daily (Patient not taking: Reported on 3/9/2023) 120 capsule 11    naloxone (NARCAN) 4 MG/0.1ML nasal spray Spray 1 spray (4 mg) into one nostril alternating nostrils as needed for opioid reversal every 2-3 minutes until assistance arrives (Patient not taking: Reported on 4/19/2023) 0.2 mL 0    nitroGLYcerin (NITROSTAT) 0.4 MG sublingual tablet For chest pain place 1 tablet under the tongue every 5 minutes for 3 doses. If symptoms persist 5 minutes after 1st dose call 911. (Patient not taking: Reported on 4/19/2023) 25 tablet 0    ondansetron (ZOFRAN ODT) 4 MG ODT tab Take 1 tablet (4 mg) by mouth every 8 hours as needed for nausea (Patient not taking: Reported on 4/19/2023) 30 tablet 1    prochlorperazine (COMPAZINE) 10 MG tablet  (Patient not taking: Reported on 4/19/2023)      zolpidem ER (AMBIEN CR) 12.5 MG CR tablet TAKE ONE TABLET BY MOUTH NIGHTLY AS NEEDED FOR SLEEP. MUST LAST 30 DAYS (Patient not taking: Reported on 4/19/2023) 30 tablet 0       Family History   Problem Relation Age of Onset    Hypertension Father         Lived to age 87    Connective Tissue Disorder Mother         LUPUS    Heart Disease Mother         Valve replacement    Anxiety Disorder Mother         Lived to age 84    Dementia Mother         Nursing Home (lived to age 86)       Social History     Socioeconomic History    Marital status:      Spouse name: Alessandra    Number of children: 2     Years of education: Not on file    Highest education level: Not on file   Occupational History    Occupation: Retired   Tobacco Use    Smoking status: Never    Smokeless tobacco: Never   Vaping Use    Vaping status: Never Used   Substance and Sexual Activity    Alcohol use: Yes     Alcohol/week: 10.0 standard drinks of alcohol     Types: 10 Cans of beer per week     Comment: 1 hard  liquid and 1 wine or beer per day    Drug use: No    Sexual activity: Yes     Partners: Female     Birth control/protection: Female Surgical   Other Topics Concern     Service Not Asked    Blood Transfusions Not Asked    Caffeine Concern Not Asked    Occupational Exposure Not Asked    Hobby Hazards Not Asked    Sleep Concern Not Asked    Stress Concern Not Asked    Weight Concern Not Asked    Special Diet Not Asked    Back Care Not Asked    Exercise Not Asked    Bike Helmet Not Asked    Seat Belt Not Asked    Self-Exams Not Asked    Parent/sibling w/ CABG, MI or angioplasty before 65F 55M? No   Social History Narrative    Not on file     Social Determinants of Health     Financial Resource Strain: Not on file   Food Insecurity: Not on file   Transportation Needs: Not on file   Physical Activity: Not on file   Stress: Not on file   Social Connections: Not on file   Intimate Partner Violence: Not on file   Housing Stability: Not on file            Past Medical History:     Past Medical History:   Diagnosis Date    Allergic rhinitis     Allergy, unspecified not elsewhere classified     Seasonal allergies, pollen, dust, smoke and animals    Antiplatelet or antithrombotic long-term use     Anxiety     Arthritis     Chest pain     Chronic sinusitis     Coronary atherosclerosis of unspecified type of vessel, native or graft     Coronary artery disease    Depressive disorder 1995    Gastroesophageal reflux disease 2020    Cleared with medication    Head injury 1954    Hiatal hernia 2015    Right Side    History of blood transfusion  12/15/2004    Prostate Surgery - My own blood    Hyperlipidemia     Hypertension     Inguinal hernia     Kidney problem 10/08/2017    Lithotripsy    Kidney stones     Malignant neoplasm of prostate (H)     Prostate cancer    Prostate cancer (H)     Syncope     Tonsil cancer (H)               Past Surgical History:     Past Surgical History:   Procedure Laterality Date    ARTHRODESIS FOOT  7/23/2013    Procedure: ARTHRODESIS FOOT;  Great Toe Arthrodesis Left Foot;  Surgeon: Ash Gonzalez DPM;  Location: [facility]    ARTHRODESIS FOOT  6/10/2014    Procedure: ARTHRODESIS FOOT;  Surgeon: Ash Gonzalez DPM;  Location: [facility]    BIOPSY  10/1/2004    dermatologist biopsies    COLONOSCOPY  10/7/2013    Procedure: COLONOSCOPY;  Colonoscopy;  Surgeon: Mike Fallon MD;  Location: [facility]    CYSTOSCOPY N/A 2/16/2022    Procedure: CYSTOSCOPY and bladder stone removal;  Surgeon: David Rogers MD;  Location: [facility]    ESOPHAGOSCOPY, GASTROSCOPY, DUODENOSCOPY (EGD), COMBINED N/A 2/12/2020    Procedure: ESOPHAGOGASTRODUODENOSCOPY (EGD);  Surgeon: Sam Escobar MD;  Location: [facility]    EXTRACORPOREAL SHOCK WAVE LITHOTRIPSY (ESWL) Bilateral 10/18/2017    Procedure: EXTRACORPOREAL SHOCK WAVE LITHOTRIPSY (ESWL);  BILATERAL EXTRACORPOREAL SHOCKWAVE LITHOTRIPSY ;  Surgeon: Meir Torres MD;  Location: [facility] OR    HC CORRECT BUNION,SIMPLE  08/11/2005    x3    HC REMV TOE BENIGN BONE LESN  08/11/2005    HEAD & NECK SURGERY  1954    From a fall    HERNIORRHAPHY INGUINAL  7/3/2013    Procedure: HERNIORRHAPHY INGUINAL;  Open Repair Inguinal hernia Right with mesh ;  Surgeon: Sam Escobar MD;  Location: [facility]    IR GASTROSTOMY TUBE PERCUTANEOUS PLCMNT  6/7/2021    MOHS MICROGRAPHIC PROCEDURE  08/23/11    ear and chin-[facility]    OPEN REDUCTION INTERNAL FIXATION WRIST Right 7/18/2017    Procedure: OPEN REDUCTION INTERNAL FIXATION WRIST;  Right distal radius open reduction and internal fixation;  Surgeon:  Pedro Blanca, DO;  Location: [facility]    RECONSTRUCT FOREFOOT WITH METATARSOPHALANGEAL (MTP) FUSION  6/10/2014    Procedure: RECONSTRUCT FOREFOOT WITH METATARSOPHALANGEAL (MTP) FUSION;  Surgeon: Ash Gonzalez DPM;  Location: [facility]    STENT, CORONARY, DEMI      SURGICAL HISTORY OF -   1999/1974    lt knee    SURGICAL HISTORY OF -   10/2004    lithotripsy    SURGICAL HISTORY OF -   11/05    angiogram with stent    VASCULAR SURGERY  11/17/2005    Puncture of iliac artery during and angiogram    [facility] LAPAROSCOPY, SURGICAL PROSTATECTOMY, RETROPUBIC RADICAL, W/NERVE SPARING  11/30/2004    With full bilateral pelvic lymphadenectomy.  -[facility].    [facility] TOTAL KNEE ARTHROPLASTY  05/01/08    Left knee              Allergies:   Animal dander, Azithromycin, Dust mites, Pollen extract, and Smoke.       Data:   All laboratory data reviewed:    Recent Labs   Lab Test 04/25/23  1325 12/12/22  1229 11/28/22  1108 10/27/22  1009 08/16/22  1315 05/13/22  1634 11/15/21  1106   LDL 65  --   --  48  --   --  65   HDL 81  --   --  47  --   --  51   NHDL 81  --   --  77  --   --  105   CHOL 162  --   --  124  --   --  156   TRIG 79  --   --  147  --   --  200*   TSH  --  3.39 3.96  --  0.06*   < >  --     < > = values in this interval not displayed.       Lab Results   Component Value Date    WBC 6.4 04/25/2023    WBC 6.6 07/02/2021    RBC 3.96 (L) 04/25/2023    RBC 3.25 (L) 07/02/2021    HGB 12.7 (L) 04/25/2023    HGB 9.3 (L) 07/02/2021    HCT 39.1 (L) 04/25/2023    HCT 29.4 (L) 07/02/2021    MCV 99 04/25/2023    MCV 91 07/02/2021    MCH 32.1 04/25/2023    MCH 28.6 07/02/2021    MCHC 32.5 04/25/2023    MCHC 31.6 07/02/2021    RDW 13.3 04/25/2023    RDW 16.3 (H) 07/02/2021     04/25/2023     07/02/2021       Lab Results   Component Value Date     04/25/2023     07/02/2021    POTASSIUM 4.6 04/25/2023    POTASSIUM 4.3 12/15/2022    POTASSIUM 4.1 07/02/2021    CHLORIDE 106 04/25/2023    CHLORIDE 101  12/15/2022    CHLORIDE 100 07/02/2021    CO2 22 04/25/2023    CO2 26 12/15/2022    CO2 29 07/02/2021    ANIONGAP 12 04/25/2023    ANIONGAP 6 12/15/2022    ANIONGAP 6 07/02/2021     (H) 04/25/2023    GLC 93 12/15/2022     (H) 07/02/2021    BUN 20.1 04/25/2023    BUN 10 12/15/2022    BUN 23 07/02/2021    CR 1.10 04/25/2023    CR 1.14 07/02/2021    GFRESTIMATED 72 04/25/2023    GFRESTIMATED 54 (L) 08/19/2022    GFRESTIMATED 65 07/02/2021    GFRESTBLACK 75 07/02/2021    LATRELL 9.1 04/25/2023    LATRELL 8.5 07/02/2021      Lab Results   Component Value Date    AST 31 04/25/2023    AST 34 07/02/2021    ALT 48 04/25/2023    ALT 37 07/02/2021       No results found for: A1C    Lab Results   Component Value Date    INR 0.98 09/14/2022    INR 1.49 (H) 06/07/2021    INR 1.08 10/28/2017         AIDEN CH MD  [facility]    Thank you for allowing me to participate in the care of your patient.      Sincerely,     AIDEN CH MD     [facility]  cc:   Aiden Ch MD  [address]  [place] [address]

## 2023-04-26 NOTE — TELEPHONE ENCOUNTER
PRIOR AUTHORIZATION DENIED    Medication: Zolpidem tartrate er 12.5mg - PA denied    Denial Date: 4/21/2023    Denial Rational: RECEIVED DENIAL FROM INS FROM DATE 2/26/23 - NSJT WHO SUBMITTED THE ORIGINAL REQUEST? But they are not taking the new request I sent on 4/21/23 they are deferring to the previous denial - DENIAL REASON:     Appeal Information: we will need to appeal denial from Feb - or I could re-try submitting the PA on 4/28 - since this is 60 days after initial denial (we could possibly get this approved faster?   If MD would like to appeal we will need the PT to sign the above document allowing us to submit the appeal for PT

## 2023-04-28 DIAGNOSIS — M19.071 OSTEOARTHRITIS OF RIGHT FOOT, UNSPECIFIED OSTEOARTHRITIS TYPE: ICD-10-CM

## 2023-05-09 NOTE — TELEPHONE ENCOUNTER
Routing refill request to provider for review/approval because:    Requested Prescriptions   Pending Prescriptions Disp Refills    oxyCODONE (ROXICODONE) 5 MG tablet 120 tablet 0     Sig: Take 1-2 tablets (5-10 mg) by mouth every 6 hours as needed for severe pain       There is no refill protocol information for this order

## 2023-05-23 NOTE — TELEPHONE ENCOUNTER
[facility] calling, patient is OUT of his pain medication, he states that he took his last dose yesterday.  He told the providers at [facility] that he only received #90 at pharmacy. PDMP says he received #120 and called I pharmacy and they verified that patient received #120. (there counts are correct in narcotic log book)    They did not refill as it was to early to fill, and 'his story' did not sound like truth.    Patient is scheduled ONE radiation treatment on his right 7th rib on Thursday, 05- at [facility].  Currently staying in [place] in order to receive treatment on Thursday.    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  He was told that he could take Tylenol if needed.    [phone] - [facility] - if questions, can talk with RN in clinic or RUPAL Lennon XRO/

## 2023-05-25 NOTE — TELEPHONE ENCOUNTER
Patient is receiving treatment for cancer in his rib.    He was receiving Oxycodone from Dr. Rivers.    [place] would prefer that Dr. Rivers continue managing pain medication.    He was last given Oxycodone on 5/9/23- patient states he only got #90.    Dr. Bhagat would like to know intentions for long term use.    Patient was upset about Palliative care for not refilling his medicaiton    Please call Dr. Bhagat to discuss.  \  Anita Damon RN on 5/25/2023 at 12:30 PM

## 2023-05-25 NOTE — TELEPHONE ENCOUNTER
Patient is out of medication, he is needing to have a refill today if possible. He had radiation treatment today and will be heading home.  He states that he is doing his best to NOT take more than 6 per day, but has had some extra pain lately.      Patient would like fill if possible.    Bri Oshea XRO/

## 2023-05-26 NOTE — TELEPHONE ENCOUNTER
Patient can use tylenol for extra pain but should not use more then allotted 6 pills per day of oxycodone. Refill not due until the 29th. I will send for this to be picked up the day prior.

## 2023-05-30 NOTE — TELEPHONE ENCOUNTER
I called this patient with the following per Dr. Rivers:Patient can use tylenol for extra pain but should not use more then allotted 6 pills per day of oxycodone. Refill not due until the 29th. I will send for this to be picked up the day prior.

## 2023-06-08 NOTE — TELEPHONE ENCOUNTER
Quan calls to verify his my chart message went through. Is asking Dr Hernandez to call him or respond as soon as possible.      Dosing wt (6/6): 168 lbs  Usual body wt and wt history unclear. Will continue to monitor/trend.

## 2023-06-14 NOTE — TELEPHONE ENCOUNTER
Patient was hoping to change the ramelteon   Can he try Tomoprazalem      He is also wondering if you are planning to take him off Oxycodone?  He would like to be tapered slowly if this is the case.  Please advise,    Anita Damon RN on 6/14/2023 at 1:25 PM

## 2023-06-21 NOTE — TELEPHONE ENCOUNTER
"Routing refill request to provider for review/approval because:  Drug interaction warning        Requested Prescriptions   Pending Prescriptions Disp Refills    citalopram (CELEXA) 40 MG tablet 90 tablet 0     Sig: Take 1 tablet (40 mg) by mouth daily       SSRIs Protocol Passed - 6/21/2023  4:51 PM        Passed - Recent (12 mo) or future (30 days) visit within the authorizing provider's specialty     Patient has had an office visit with the authorizing provider or a provider within the authorizing providers department within the previous 12 mos or has a future within next 30 days. See \"Patient Info\" tab in inbasket, or \"Choose Columns\" in Meds & Orders section of the refill encounter.              Passed - Medication is active on med list        Passed - Patient is age 18 or older             "

## 2023-07-05 NOTE — TELEPHONE ENCOUNTER
Patient just wanting to make sure his medication is filled today so that he can  tomorrow when he is back in town.      Bri Oshea XRO/

## 2023-07-19 NOTE — PROGRESS NOTES
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  Recently completed radiation for metastatic lesion on his rib.  Planning for repeat imaging in 3 months now with oncology.    Chronic, continuous use of opioids  Uncomplicated opioid dependence (H)  Patient continues with cancer related pain and has done well with 6 tablets daily.  On higher doses in the past and did not tolerate decreased dose.  Significant increase in pain with recent radiation and rib lesion.  Patient understands risk of chronic opioids as well as interaction with other medications including risk of respiratory suppression and death.  Did well with getting off benzodiazepines previously.  We will plan to continue now and limiting to 6 tablets daily.  Understands he should try to limit this further.  Follow-up with me in 2 to 3 months.  Urine drug screen up-to-date.    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  Was on Xanax but discontinued as well.  Has not tolerated her mouth and now often not falling asleep till the morning.  Risk and possible side effects with Ambien including interaction with alcohol discussed.  We will restart now at 5 mg.  Also discussion today regarding sleep hygiene techniques she can use at home.  His chronic medical conditions and anxiety large contributors to inability to fall asleep.  - zolpidem (AMBIEN) 5 MG tablet  Dispense: 30 tablet; Refill: 0    Hypothyroidism due to acquired atrophy of thyroid  Most recent TSH low and Synthroid decreased by oncology.  Now 2 months later TSH still low.  Will further decrease to 125 mcg.  Patient has not noted symptom change with this but does feel fatigued and low energy since his radiation but  "improving.  Repeat TSH in 6 weeks.  - TSH with free T4 reflex  - TSH with free T4 reflex  - T4 free    Stage 3 chronic kidney disease, unspecified whether stage 3a or 3b CKD (H)    Chronic maxillary sinusitis    Drug-induced hepatitis  Now resolved and off of prednisone as well as chemo.    Benign essential hypertension  Blood pressure is low in clinic today.  Has been low consistently at home.  Patient is asymptomatic with this without lightheadedness or syncopal symptoms.  Looks like he has only been on metoprolol for his blood pressure.  Would decrease to 25 mg now.  If things remain low I would get rid of this completely.  - metoprolol succinate ER (TOPROL XL) 50 MG 24 hr tablet  Dispense: 90 tablet; Refill: 3    Osteoarthritis of right foot, unspecified osteoarthritis type  - oxyCODONE (ROXICODONE) 5 MG tablet  Dispense: 120 tablet; Refill: 0        BMI:   Estimated body mass index is 31.4 kg/m  as calculated from the following:    Height as of 4/25/23: 1.702 m (5' 7\").    Weight as of this encounter: 90.9 kg (200 lb 8 oz).   Weight management plan: Discussed healthy diet and exercise guidelines      Monico Rivers MD  [facility] LAURIE Wolff is a 72 year old, presenting for the following health issues:  Edema        7/19/2023     3:08 PM   Additional Questions   Roomed by Briana longo     History of Present Illness       Mental Health Follow-up:  Patient presents to follow-up on Anxiety.    Patient's anxiety since last visit has been:  Better  The patient is having other symptoms associated with anxiety.  Any significant life events: health concerns  Patient is feeling anxious or having panic attacks.  Patient has no concerns about alcohol or drug use.    He eats 0-1 servings of fruits and vegetables daily.He consumes 1 sweetened beverage(s) daily.He exercises with enough effort to increase his heart rate 10 to 19 minutes per day.  He exercises with enough effort to increase " his heart rate 3 or less days per week.   He is taking medications regularly.  Today's AME-7 Score: 9     Discuss meds, ankle swelling, back pain, anxiety .         Review of Systems   Constitutional, HEENT, cardiovascular, pulmonary, GI, , musculoskeletal, neuro, skin, endocrine and psych systems are negative, except as otherwise noted.      Objective    BP (!) 86/60   Pulse 70   Temp 97.2  F (36.2  C)   Resp 14   Wt 90.9 kg (200 lb 8 oz)   SpO2 95%   BMI 31.40 kg/m    Body mass index is 31.4 kg/m .  Physical Exam   GENERAL: healthy, alert and no distress  EYES: Eyes grossly normal to inspection, PERRL and conjunctivae and sclerae normal  HENT: nose and mouth without ulcers or lesions  NECK: no adenopathy, no asymmetry, and thyroid normal to palpation  RESP: lungs clear to auscultation - no rales, rhonchi or wheezes  CV: regular rate and rhythm, normal S1 S2, no S3 or S4, no murmur, click or rub, no peripheral edema and peripheral pulses strong  ABDOMEN: soft, nontender, no hepatosplenomegaly, no masses and bowel sounds normal  MS: no gross musculoskeletal defects noted, trace edema  SKIN: no suspicious lesions or rashes  NEURO: Normal strength and tone, mentation intact and speech normal  PSYCH: mentation appears normal, affect normal/bright

## 2023-07-20 PROBLEM — E43 SEVERE PROTEIN-CALORIE MALNUTRITION (H): Status: RESOLVED | Noted: 2022-08-16 | Resolved: 2023-01-01

## 2023-07-28 NOTE — TELEPHONE ENCOUNTER
Sent this information to patient via [facility]. Postponing 2 days for follow up if mychart is unread.    Kristal Michael CMA (AAMA)

## 2023-07-28 NOTE — TELEPHONE ENCOUNTER
Potassium replacement sent. He should have labs rechecked in one week. It could be a reason for his weakness and if he is unable to keep up on oral hydration he would need to be seen in the ER but oral intake fine if he is tolerating this.

## 2023-08-02 NOTE — PROGRESS NOTES
Chief Complaint   Patient presents with    Follow Up     Bladder stones       Quan Murphy is a 73 year old male who presents today for follow up of   Chief Complaint   Patient presents with    Follow Up     Bladder stones    Pleasant 73 y.o. male with h/o prostate cancer s/p RRP many years ago.  Recent psa was 0.05.  He also has h/o renal stones s/p ESWL and also bladder stones s/p removal.  He has no urinary complaints.  Recent PET scan showed bilateral small renal stones only.    Current Outpatient Medications   Medication Sig Dispense Refill    aspirin (ASA) 325 MG tablet Take 325 mg by mouth      cetirizine (ZYRTEC) 10 MG tablet Take 1 tablet (10 mg) by mouth daily 30 tablet 1    citalopram (CELEXA) 40 MG tablet Take 1 tablet (40 mg) by mouth daily 90 tablet 0    DULoxetine (CYMBALTA) 60 MG capsule Take 1 capsule (60 mg) by mouth daily 90 capsule 3    levothyroxine (SYNTHROID/LEVOTHROID) 125 MCG tablet Take 1 tablet (125 mcg) by mouth daily 90 tablet 0    metoprolol succinate ER (TOPROL XL) 50 MG 24 hr tablet Take 0.5 tablets (25 mg) by mouth daily 90 tablet 3    nystatin (MYCOSTATIN) 290285 UNIT/ML suspension Swish and swallow 5 mLs (500,000 Units) in mouth 4 times daily as needed (thrush) 400 mL 1    omeprazole (PRILOSEC) 40 MG DR capsule Take 1 capsule (40 mg) by mouth daily 90 capsule 3    oxyCODONE (ROXICODONE) 5 MG tablet Take 1-2 tablets (5-10 mg) by mouth every 6 hours as needed for severe pain (Limit to 6 pills per day) 120 tablet 0    pilocarpine (SALAGEN) 7.5 MG tablet Take 1 tablet (7.5 mg) by mouth 3 times daily 90 tablet 0    potassium chloride ER (K-TAB) 20 MEQ CR tablet Take 1 tablet (20 mEq) by mouth 2 times daily for 14 days 28 tablet 0    pregabalin (LYRICA) 150 MG capsule Take 1 capsule (150 mg) by mouth 2 times daily 120 capsule 1    rosuvastatin (CRESTOR) 40 MG tablet Take 1 tablet (40 mg) by mouth daily 90 tablet 3    zolpidem (AMBIEN) 5 MG tablet Take 1 tablet (5 mg) by mouth nightly  as needed for sleep 30 tablet 0    cevimeline (EVOXAC) 30 MG capsule Take 1 capsule (30 mg) by mouth 3 times daily (Patient not taking: Reported on 3/9/2023) 120 capsule 11    naloxone (NARCAN) 4 MG/0.1ML nasal spray Spray 1 spray (4 mg) into one nostril alternating nostrils as needed for opioid reversal every 2-3 minutes until assistance arrives (Patient not taking: Reported on 4/19/2023) 0.2 mL 0    nitroGLYcerin (NITROSTAT) 0.4 MG sublingual tablet For chest pain place 1 tablet under the tongue every 5 minutes for 3 doses. If symptoms persist 5 minutes after 1st dose call 911. (Patient not taking: Reported on 4/19/2023) 25 tablet 0    ondansetron (ZOFRAN ODT) 4 MG ODT tab Take 1 tablet (4 mg) by mouth every 8 hours as needed for nausea (Patient not taking: Reported on 4/19/2023) 30 tablet 1    prochlorperazine (COMPAZINE) 10 MG tablet  (Patient not taking: Reported on 4/19/2023)       Allergies   Allergen Reactions    Animal Dander     Azithromycin Nausea and Vomiting    Dust Mites     Pollen Extract     Smoke.       Past Medical History:   Diagnosis Date    Allergic rhinitis     Allergy, unspecified not elsewhere classified     Seasonal allergies, pollen, dust, smoke and animals    Antiplatelet or antithrombotic long-term use     Anxiety     Arthritis     Chest pain     Chronic sinusitis     Coronary atherosclerosis of unspecified type of vessel, native or graft     Coronary artery disease    Depressive disorder 1995    Gastroesophageal reflux disease 2020    Cleared with medication    Head injury 1954    Hiatal hernia 2015    Right Side    History of blood transfusion 12/15/2004    Prostate Surgery - My own blood    Hyperlipidemia     Hypertension     Inguinal hernia     Kidney problem 10/08/2017    Lithotripsy    Kidney stones     Malignant neoplasm of prostate (H)     Prostate cancer    Prostate cancer (H)     Syncope     Tonsil cancer (H)      Past Surgical History:   Procedure Laterality Date    ARTHRODESIS  FOOT  7/23/2013    Procedure: ARTHRODESIS FOOT;  Great Toe Arthrodesis Left Foot;  Surgeon: Ash Gonzalez DPM;  Location: [facility]    ARTHRODESIS FOOT  6/10/2014    Procedure: ARTHRODESIS FOOT;  Surgeon: Ash Gonzalez DPM;  Location: [facility]    BIOPSY  10/1/2004    dermatologist biopsies    COLONOSCOPY  10/7/2013    Procedure: COLONOSCOPY;  Colonoscopy;  Surgeon: Mike Fallon MD;  Location[facility]    CYSTOSCOPY N/A 2/16/2022    Procedure: CYSTOSCOPY and bladder stone removal;  Surgeon: David Rogers MD;  Location[facility]    ESOPHAGOSCOPY, GASTROSCOPY, DUODENOSCOPY (EGD), COMBINED N/A 2/12/2020    Procedure: ESOPHAGOGASTRODUODENOSCOPY (EGD);  Surgeon: Sam Escobar MD;  [facility]    EXTRACORPOREAL SHOCK WAVE LITHOTRIPSY (ESWL) Bilateral 10/18/2017    Procedure: EXTRACORPOREAL SHOCK WAVE LITHOTRIPSY (ESWL);  BILATERAL EXTRACORPOREAL SHOCKWAVE LITHOTRIPSY ;  Surgeon: Meir Torres MD;  Location: [facility]    HC CORRECT BUNION,SIMPLE  08/11/2005    x3    HC REMV TOE BENIGN BONE LESN  08/11/2005    HEAD & NECK SURGERY  1954    From a fall    HERNIORRHAPHY INGUINAL  7/3/2013    Procedure: HERNIORRHAPHY INGUINAL;  Open Repair Inguinal hernia Right with mesh ;  Surgeon: Sam Escobar MD;  Location[facility]    IR GASTROSTOMY TUBE PERCUTANEOUS PLCMNT  6/7/2021    MOHS MICROGRAPHIC PROCEDURE  08/23/11    ear and chin-[facility]    OPEN REDUCTION INTERNAL FIXATION WRIST Right 7/18/2017    Procedure: OPEN REDUCTION INTERNAL FIXATION WRIST;  Right distal radius open reduction and internal fixation;  Surgeon: Pedro Blanca DO;  [facility]    RECONSTRUCT FOREFOOT WITH METATARSOPHALANGEAL (MTP) FUSION  6/10/2014    Procedure: RECONSTRUCT FOREFOOT WITH METATARSOPHALANGEAL (MTP) FUSION;  Surgeon: Ash Gonzalez DPM;  Location: [facility]    STENT, CORONARY, DEMI      SURGICAL HISTORY OF -   1999/1974    lt knee    SURGICAL HISTORY OF -   10/2004    lithotripsy    SURGICAL  HISTORY OF -   11/05    angiogram with stent    VASCULAR SURGERY  11/17/2005    Puncture of iliac artery during and angiogram    [facility] LAPAROSCOPY, SURGICAL PROSTATECTOMY, RETROPUBIC RADICAL, W/NERVE SPARING  11/30/2004    With full bilateral pelvic lymphadenectomy.  F-[facility].    [facility] TOTAL KNEE ARTHROPLASTY  05/01/08    Left knee     Family History   Problem Relation Age of Onset    Hypertension Father         Lived to age 87    Connective Tissue Disorder Mother         LUPUS    Heart Disease Mother         Valve replacement    Anxiety Disorder Mother         Lived to age 84    Dementia Mother         Nursing Home (lived to age 86)     Social History     Socioeconomic History    Marital status:      Spouse name: Alessandra    Number of children: 2    Years of education: None    Highest education level: None   Occupational History    Occupation: Retired   Tobacco Use    Smoking status: Never    Smokeless tobacco: Never   Vaping Use    Vaping Use: Never used   Substance and Sexual Activity    Alcohol use: Yes     Alcohol/week: 10.0 standard drinks of alcohol     Types: 10 Cans of beer per week     Comment: 1 hard  liquid and 1 wine or beer per day    Drug use: No    Sexual activity: Yes     Partners: Female     Birth control/protection: Female Surgical   Other Topics Concern    Parent/sibling w/ CABG, MI or angioplasty before 65F 55M? No       REVIEW OF SYSTEMS  =================  C: NEGATIVE for fever, chills, change in weight  I: NEGATIVE for worrisome rashes, moles or lesions  E/M: NEGATIVE for ear, mouth and throat problems  R: NEGATIVE for significant cough or SHORTNESS OF BREATH  CV:  NEGATIVE for chest pain, palpitations or peripheral edema  GI: NEGATIVE for nausea, abdominal pain, heartburn, or change in bowel habits  NEURO: NEGATIVE numbness/weakness  : see HPI  PSYCH: NEGATIVE depression/anxiety  LYmph: no new enlarged lymph nodes  Ortho: no new trauma/movements    Physical Exam:  Blood pressure 102/68,  "temperature 97.8  F (36.6  C), temperature source Temporal, height 1.702 m (5' 7\"), weight 91.6 kg (202 lb).    GENERAL: healthy, alert and no distress  EYES: Eyes grossly normal to inspection, conjunctivae and sclerae normal  RESP: no audible wheeze, cough, or visible cyanosis.  No visible retractions or increased work of breathing.  Able to speak fully in complete sentences.  NEURO: Cranial nerves grossly intact, mentation intact and speech normal  PSYCH: mentation appears normal, affect normal/bright, judgement and insight intact, normal speech and appearance well-groomed    PET scan's report reviewed.    Assessment/Plan:   (N21.0) Bladder stones  (primary encounter diagnosis)  Comment:    Plan: prn              (C61) Malignant neoplasm of prostate (H)  Comment:    Plan: recheck psa in one year    (Z87.442) History of renal stone  Comment: small and asympmatic  Plan: prn                    "

## 2023-09-01 NOTE — TELEPHONE ENCOUNTER
The RX for the oxycodone 5 mg that is due to be filled on 09/17/2023 did not come over correctly so if another one could be sent that would be great.    Thank you,   Hafsa Theodore, Pharmacy [facility]

## 2023-09-27 NOTE — MEDICATION SCRIBE - ADMISSION MEDICATION HISTORY
Medication Scribe Admission Medication History    Admission medication history is complete. The information provided in this note is only as accurate as the sources available at the time of the update.    Medication reconciliation/reorder completed by provider prior to medication history? No    Information Source(s): Patient, Family member, and CareEverywhere/SureScripts via in-person    Pertinent Information: wife Cheri very helpful    Changes made to PTA medication list:  Added: Augmentin, Famotidine  Deleted: prochlorperazine, pilocarpine (Salagen)  Changed: Omeprazole to prn, levothyroxine to 100mcg    Medication Affordability:       Allergies reviewed with patient and updates made in EHR: yes    Medication History Completed By: SUJIT JIN 9/27/2023 3:27 PM    Prior to Admission medications    Medication Sig Last Dose Taking? Auth Provider Long Term End Date   amoxicillin-clavulanate (AUGMENTIN) 875-125 MG tablet Take 1 tablet by mouth 2 times daily 9/22/2023 at pm Yes Reported, Patient  9/29/23   aspirin (ASA) 325 MG tablet Take 325 mg by mouth daily 8/29/2023 at am Yes Reported, Patient No    cetirizine (ZYRTEC) 10 MG tablet Take 1 tablet (10 mg) by mouth daily 9/22/2023 at am Yes Monico Rivers MD     citalopram (CELEXA) 40 MG tablet TAKE 1 TABLET BY MOUTH ONCE DAILY 9/22/2023 at am Yes Monico Rivers MD Yes    DULoxetine (CYMBALTA) 60 MG capsule Take 1 capsule (60 mg) by mouth daily 9/22/2023 at am Yes Monico Rivers MD Yes    famotidine (PEPCID) 20 MG tablet Take 20 mg by mouth 2 times daily as needed (heartburn) Unknown at unkn Yes Reported, Patient     levothyroxine (SYNTHROID/LEVOTHROID) 100 MCG tablet Take 100 mcg by mouth daily 9/22/2023 at am Yes Reported, Patient Yes    metoprolol succinate ER (TOPROL XL) 50 MG 24 hr tablet Take 0.5 tablets (25 mg) by mouth daily 9/22/2023 at am Yes Monico Rivers MD Yes    naloxone (NARCAN) 4 MG/0.1ML nasal spray Spray 1 spray (4 mg)  into one nostril alternating nostrils as needed for opioid reversal every 2-3 minutes until assistance arrives Unknown at unkn Yes oMnico Rivers MD Yes    nitroGLYcerin (NITROSTAT) 0.4 MG sublingual tablet For chest pain place 1 tablet under the tongue every 5 minutes for 3 doses. If symptoms persist 5 minutes after 1st dose call 911. Unknown at unkn Yes Jose Hernandez MD Yes    nystatin (MYCOSTATIN) 795575 UNIT/ML suspension Swish and swallow 5 mLs (500,000 Units) in mouth 4 times daily as needed (thrush) Unknown at unkn Yes Monico Rivers MD     omeprazole (PRILOSEC) 40 MG DR capsule Take 1 capsule (40 mg) by mouth daily  Patient taking differently: Take 40 mg by mouth daily as needed (heartburn) Unknown at unkn Yes Jose Hernandez MD     ondansetron (ZOFRAN ODT) 4 MG ODT tab Take 1 tablet (4 mg) by mouth every 8 hours as needed for nausea Unknown at unkn Yes Jose Hernandez MD     oxyCODONE (ROXICODONE) 5 MG tablet Take 1-2 tablets (5-10 mg) by mouth every 6 hours as needed for severe pain (Limit to 6 pills per day) 9/26/2023 at unkn Yes Severo Palma MD     pregabalin (LYRICA) 150 MG capsule TAKE 1 CAPSULE (150 MG) BY MOUTH 2 TIMES DAILY 9/22/2023 at hs Yes Hoang Carlisle MD Yes    rosuvastatin (CRESTOR) 40 MG tablet Take 1 tablet (40 mg) by mouth daily 9/22/2023 at am Yes Aramis Ch MD Yes    zolpidem (AMBIEN) 5 MG tablet TAKE 1 TABLET (5 MG) BY MOUTH NIGHTLY AS NEEDED FOR SLEEP 9/22/2023 at hs Yes Monico Rivers MD No    oxyCODONE (ROXICODONE) 5 MG tablet TAKE 1-2 TABLETS (5-10 MG) BY MOUTH EVERY 6 HOURS AS NEEDED FOR SEVERE PAIN (LIMIT TO 6 PILLS PER DAY)  at [facility]  Monico Rivers MD     oxyCODONE (ROXICODONE) 5 MG tablet TAKE ONE TO TWO TABLETS BY MOUTH EVERY 6 HOURS AS NEEDED FOR SEVERE PAIN (DO NOT TAKE MORE THAN 6 TABLETS PER DAY).  at [facility]  Hoang Carlisle MD

## 2023-09-27 NOTE — ED TRIAGE NOTES
Pt had procedure last Tuesday ablation on liver - hx of cancer. Pt has been having N/V. Fevers ongoing since last weekend taking PO abx. Unable to tolerate PO meds or abx due to N/V.      Triage Assessment       Row Name 09/27/23 1126       Triage Assessment (Adult)    Airway WDL WDL       Respiratory WDL    Respiratory WDL WDL       Cardiac WDL    Cardiac WDL WDL

## 2023-09-27 NOTE — ED NOTES
Writer went to PT's room to hook another liter of Saline solution, PT's wife states to hold everything and that they just want to go home. Provider informed and went to bedside to assess PT.

## 2023-09-27 NOTE — ED NOTES
RN went to PT's room along with provider. Provider discussed the risk and benefits leaving against medical advice, but PT and spouse states that they will just go home.

## 2023-09-27 NOTE — DISCHARGE INSTRUCTIONS
Overall your results today were reassuring.  However, you do have a few lab abnormalities and also some signs on CT scan to indicate that you may have an acute problem of your gallbladder.  As we discussed today the next step is to perform a HIDA scan.  This could be arranged as soon as tomorrow morning you are out of the emergency department.  We also recommended transfer for further evaluation by imaging and possible by a surgeon.  However, after attempting multiple different locations and discussing options you have opted for outpatient treatment/follow-up AGAINST MEDICAL ADVICE.  It is possible that you could have complications from the findings we discussed and get really sick.  I will attempt to contact your primary care doctor to arrange for a HIDA scan and outpatient follow-up as soon as possible.  Referral to gastroenterology was also provided today.  Please take the nausea and pain medications as needed.  Follow-up as soon as possible.  Return to the emergency department immediately in the meantime for any new or acutely worsened symptoms.

## 2023-09-28 NOTE — ED PROVIDER NOTES
Update from overnight.  I cared for this patient yesterday.  Please see related documentation from that visit.  Ultimately he elected to leave the emergency department AGAINST MEDICAL ADVICE.  Overnight he was found to have 1 out of 2 positive blood cultures for gram-positive cocci in clusters and chains.  I was able to contact the patient's wife by phone this morning.  Informed her of these results and recommended immediate return to the emergency department for reevaluation, blood draw/cultures, and antibiotics.  She will discuss with her  and hopefully come in for evaluation.  Last night he was adamantly opposed to transfer and being admitted.  We will of course resume care if/when he is to return.    Arun Rodney MD on 9/28/2023 at 11:35 AM      Arun Rodney MD  09/28/23 [id number]

## 2023-09-28 NOTE — ED PROVIDER NOTES
History     Chief Complaint   Patient presents with    Fever    Nausea & Vomiting     HPI  Quan Murphy is a 73 year old male with history of hypertension, CKD, coronary disease, metastatic squamous cell carcinoma s/p chemo/radiation, recent liver ablation presents for evaluation of abdominal pain, nausea and vomiting.  Patient has a history of metastatic HPV related squamous cell carcinoma.  This was initially diagnosed in 2021 and was treated with chemoradiotherapy.  He developed recurrence in 2022 and again had a good response to chemotherapy with exception of a metastases to the right seventh rib.  More recently the patient was found to have evidence of an isolated liver metastasis and he was subsequently treated with an ablation through [facility] on 9/19.  Then while traveling in the [place] [employer] [place] the patient developed fever, vomiting, flulike symptoms.  He was evaluated in the emergency department there and was found to have mild transaminitis and evidence of gallbladder wall thickening on imaging.  After evaluation by surgery it was deemed that this is very likely related to his recent ablation and not acute cholecystitis.  Patient was discharged on p.o. antibiotics and nausea medication.  However, p.o. Lasix rapidly worsened upper abdominal pain and vomiting, making it difficult to even tolerate his prescribed meds.  For these complaints he now presents to our emergency department for reevaluation.  He has had a fever up to 101  F over the past few days.  He has body aches and endorses difficulty with p.o. intake due to vomiting.  Pain is mostly localized to the upper abdomen he states is worse with vomiting.  Otherwise he denies any active chest pain, shortness of breath, changes in urinary habits, other complaints today.    Allergies:  Allergies   Allergen Reactions    Animal Dander     Azithromycin Nausea and Vomiting    Dust Mites     Pollen Extract     Smoke.         Problem List:    Patient Active Problem List    Diagnosis Date Noted    Hypothyroidism due to acquired atrophy of thyroid 03/09/2023     Priority: Medium    Malignant neoplasm metastatic to bone (H) 01/17/2023     Priority: Medium    Pancytopenia (H) 08/16/2022     Priority: Medium    Uncomplicated opioid dependence (H)      Priority: Medium    Chronic kidney disease, stage 3 (H) 06/15/2021     Priority: Medium    Failure to thrive (0-17) 06/02/2021     Priority: Medium     Replacing diagnoses that were inactivated after the 10/1/2021 regulatory import.      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     Priority: Medium    Hypomagnesemia 04/13/2021     Priority: Medium    Hiatal hernia 02/17/2020     Priority: Medium    Renal hematoma 10/28/2017     Priority: Medium    Retroperitoneal hematoma 10/28/2017     Priority: Medium    Syncope 10/18/2017     Priority: Medium    S/P ORIF (open reduction internal fixation) fracture 08/03/2017     Priority: Medium    Closed Colles' fracture of right radius with routine healing, subsequent encounter 08/03/2017     Priority: Medium    Status post insertion of drug-eluting stent into left anterior descending artery for coronary artery disease 01/05/2017     Priority: Medium    Chest pain 12/24/2016     Priority: Medium    Arthropathy 02/04/2014     Priority: Medium     Problem list name updated by automated process. Provider to review      Coronary atherosclerosis      Priority: Medium     Coronary artery disease  Problem list name updated by automated process. Provider to review      Hallux rigidus 07/16/2013     Priority: Medium    Inguinal hernia 06/28/2013     Priority: Medium    Degenerative arthritis of foot 06/28/2013     Priority: Medium    Hypertension goal BP (blood pressure) < 140/90 06/12/2012     Priority: Medium    Hyperlipidemia LDL goal <130 12/22/2011     Priority: Medium    Anxiety 11/02/2011     Priority: Medium    Allergic conjunctivitis  07/08/2008     Priority: Medium    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, insomnia type 11/17/2006     Priority: Medium     Problem list name updated by automated process. Provider to review      Acute reaction to stress 01/12/2005     Priority: Medium     Problem list name updated by automated process. Provider to review      Chronic maxillary sinusitis 01/12/2005     Priority: Medium    Contracture of palmar fascia 01/12/2005     Priority: Medium    Benign neoplasm of skin of other and unspecified parts of face 01/12/2005     Priority: Medium    Malignant neoplasm of prostate (H) 11/26/2004     Priority: Medium        Past Medical History:    Past Medical History:   Diagnosis Date    Allergic rhinitis     Allergy, unspecified not elsewhere classified     Antiplatelet or antithrombotic long-term use     Anxiety     Arthritis     Chest pain     Chronic sinusitis     Coronary atherosclerosis of unspecified type of vessel, native or graft     Depressive disorder 1995    Gastroesophageal reflux disease 2020    Head injury 1954    Hiatal hernia 2015    History of blood transfusion 12/15/2004    Hyperlipidemia     Hypertension     Inguinal hernia     Kidney problem 10/08/2017    Kidney stones     Malignant neoplasm of prostate (H)     Prostate cancer (H)     Syncope     Tonsil cancer (H)        Past Surgical History:    Past Surgical History:   Procedure Laterality Date    ARTHRODESIS FOOT  7/23/2013    Procedure: ARTHRODESIS FOOT;  Great Toe Arthrodesis Left Foot;  Surgeon: Ash Gonzalez DPM;  Location: [facility] OR    ARTHRODESIS FOOT  6/10/2014    Procedure: ARTHRODESIS FOOT;  Surgeon: Ash Gonzalez DPM;  Location: [facility]    BIOPSY  10/1/2004    dermatologist biopsies    COLONOSCOPY  10/7/2013    Procedure: COLONOSCOPY;  Colonoscopy;  Surgeon: Mike Fallon MD;  Location:  GI    CYSTOSCOPY N/A 2/16/2022    Procedure: CYSTOSCOPY and bladder stone removal;  Surgeon: David Rogers MD;   Location: [facility]    ESOPHAGOSCOPY, GASTROSCOPY, DUODENOSCOPY (EGD), COMBINED N/A 2/12/2020    Procedure: ESOPHAGOGASTRODUODENOSCOPY (EGD);  Surgeon: Sam Escobar MD;  Location: [facility]    EXTRACORPOREAL SHOCK WAVE LITHOTRIPSY (ESWL) Bilateral 10/18/2017    Procedure: EXTRACORPOREAL SHOCK WAVE LITHOTRIPSY (ESWL);  BILATERAL EXTRACORPOREAL SHOCKWAVE LITHOTRIPSY ;  Surgeon: Meir Torres MD;  Location: [facility] OR    HC CORRECT BUNION,SIMPLE  08/11/2005    x3    HC REMV TOE BENIGN BONE LESN  08/11/2005    HEAD & NECK SURGERY  1954    From a fall    HERNIORRHAPHY INGUINAL  7/3/2013    Procedure: HERNIORRHAPHY INGUINAL;  Open Repair Inguinal hernia Right with mesh ;  Surgeon: Sam Escobar MD;  Location: [facility]    IR GASTROSTOMY TUBE PERCUTANEOUS PLCMNT  6/7/2021    MOHS MICROGRAPHIC PROCEDURE  08/23/11    ear and chin-[facility]    OPEN REDUCTION INTERNAL FIXATION WRIST Right 7/18/2017    Procedure: OPEN REDUCTION INTERNAL FIXATION WRIST;  Right distal radius open reduction and internal fixation;  Surgeon: Pedro Blanca DO;  Location: [facility]    RECONSTRUCT FOREFOOT WITH METATARSOPHALANGEAL (MTP) FUSION  6/10/2014    Procedure: RECONSTRUCT FOREFOOT WITH METATARSOPHALANGEAL (MTP) FUSION;  Surgeon: Ash Gonzalez DPM;  Location: [facility] OR    STENT, CORONARY, DEMI      SURGICAL HISTORY OF -   1999/1974    lt knee    SURGICAL HISTORY OF -   10/2004    lithotripsy    SURGICAL HISTORY OF -   11/05    angiogram with stent    VASCULAR SURGERY  11/17/2005    Puncture of iliac artery during and angiogram    [facility] LAPAROSCOPY, SURGICAL PROSTATECTOMY, RETROPUBIC RADICAL, W/NERVE SPARING  11/30/2004    With full bilateral pelvic lymphadenectomy.  [facility].    [facility] TOTAL KNEE ARTHROPLASTY  05/01/08    Left knee       Family History:    Family History   Problem Relation Age of Onset    Hypertension Father         Lived to age 87    Connective Tissue Disorder Mother         LUPUS    Heart Disease Mother          Valve replacement    Anxiety Disorder Mother         Lived to age 84    Dementia Mother         Nursing Home (lived to age 86)       Social History:  Marital Status:   [2]  Social History     Tobacco Use    Smoking status: Never    Smokeless tobacco: Never   Vaping Use    Vaping Use: Never used   Substance Use Topics    Alcohol use: Yes     Alcohol/week: 10.0 standard drinks of alcohol     Types: 10 Cans of beer per week     Comment: 1 hard  liquid and 1 wine or beer per day    Drug use: No        Medications:    amoxicillin-clavulanate (AUGMENTIN) 875-125 MG tablet  aspirin (ASA) 325 MG tablet  cetirizine (ZYRTEC) 10 MG tablet  citalopram (CELEXA) 40 MG tablet  DULoxetine (CYMBALTA) 60 MG capsule  famotidine (PEPCID) 20 MG tablet  levothyroxine (SYNTHROID/LEVOTHROID) 100 MCG tablet  metoprolol succinate ER (TOPROL XL) 50 MG 24 hr tablet  naloxone (NARCAN) 4 MG/0.1ML nasal spray  nitroGLYcerin (NITROSTAT) 0.4 MG sublingual tablet  nystatin (MYCOSTATIN) 155943 UNIT/ML suspension  omeprazole (PRILOSEC) 40 MG DR capsule  ondansetron (ZOFRAN ODT) 4 MG ODT tab  oxyCODONE (ROXICODONE) 5 MG tablet  oxyCODONE-acetaminophen (PERCOCET) 5-325 MG tablet  pregabalin (LYRICA) 150 MG capsule  rosuvastatin (CRESTOR) 40 MG tablet  zolpidem (AMBIEN) 5 MG tablet  oxyCODONE (ROXICODONE) 5 MG tablet  oxyCODONE (ROXICODONE) 5 MG tablet      Review of Systems   All other systems reviewed and are negative.  See HPI.    Physical Exam   BP: (!) 160/102  Pulse: (!) 121  Temp: 98.3  F (36.8  C)  Resp: 22  SpO2: 99 %      Physical Exam  Vitals and nursing note reviewed.   Constitutional:       General: He is in acute distress.      Appearance: Normal appearance. He is ill-appearing. He is not toxic-appearing.      Comments: Patient appears in acute distress on arrival.  Pale and generally weak appearing, though fully alert and answering questions appropriately.  Mucous membranes are tacky.  Patient is wearing a stocking cap indoors due  to reports of chills.   HENT:      Head: Normocephalic and atraumatic.      Nose: Nose normal. No rhinorrhea.      Mouth/Throat:      Mouth: Mucous membranes are dry.      Pharynx: Oropharynx is clear. No oropharyngeal exudate or posterior oropharyngeal erythema.   Eyes:      General: No scleral icterus.     Extraocular Movements: Extraocular movements intact.      Conjunctiva/sclera: Conjunctivae normal.      Pupils: Pupils are equal, round, and reactive to light.   Cardiovascular:      Rate and Rhythm: Normal rate and regular rhythm.      Pulses: Normal pulses.      Heart sounds: Normal heart sounds. No murmur heard.  Pulmonary:      Effort: Pulmonary effort is normal. No respiratory distress.      Breath sounds: Normal breath sounds. No wheezing or rhonchi.   Abdominal:      General: There is no distension.      Palpations: Abdomen is soft.      Tenderness: There is abdominal tenderness. There is guarding. There is no right CVA tenderness, left CVA tenderness or rebound.      Hernia: No hernia is present.      Comments: Moderate diffuse but mostly upper abdominal tenderness.  Some voluntary guarding at times.  Otherwise no rebound or guarding, no CVA tenderness.  Difficult to assess Dill sign formally due to fairly significant tenderness to the area at baseline.   Musculoskeletal:         General: No swelling, tenderness or deformity. Normal range of motion.      Cervical back: Normal range of motion and neck supple. No rigidity or tenderness.   Skin:     General: Skin is warm and dry.      Capillary Refill: Capillary refill takes less than 2 seconds.      Coloration: Skin is pale. Skin is not jaundiced.   Neurological:      General: No focal deficit present.      Mental Status: He is alert and oriented to person, place, and time.      Cranial Nerves: No cranial nerve deficit.      Sensory: No sensory deficit.      Motor: Weakness present.      Coordination: Coordination normal.      Gait: Gait normal.    Psychiatric:         Mood and Affect: Mood normal.      Comments: Anxious, uncomfortable, otherwise cooperative with exam, answering questions appropriately.         ED Course              ED Course as of 09/27/23 2141   Wed Sep 27, 2023   1356 Overall labs reassuring.  No maryse signs of sepsis.  Mild elevation in alkaline phosphatase but transaminases otherwise normal.  Findings of possible cholecystitis based on CT images but this was also the case during recent ED visit in [place].  Will obtain right upper quadrant ultrasound.  Patient still requiring IV narcotics for pain control.     Procedures              Results for orders placed or performed during the hospital encounter of 09/27/23 (from the past 24 hour(s))   CBC with platelets differential    Narrative    The following orders were created for panel order CBC with platelets differential.  Procedure                               Abnormality         Status                     ---------                               -----------         ------                     CBC with platelets and d...[[id number]]  Abnormal            Final result                 Please view results for these tests on the individual orders.   INR   Result Value Ref Range    INR 1.29 (H) 0.85 - 1.15   Partial thromboplastin time   Result Value Ref Range    aPTT 27 22 - 38 Seconds   Basic metabolic panel   Result Value Ref Range    Sodium 139 135 - 145 mmol/L    Potassium 3.3 (L) 3.4 - 5.3 mmol/L    Chloride 109 (H) 98 - 107 mmol/L    Carbon Dioxide (CO2) 14 (L) 22 - 29 mmol/L    Anion Gap 16 (H) 7 - 15 mmol/L    Urea Nitrogen 11.5 8.0 - 23.0 mg/dL    Creatinine 1.09 0.67 - 1.17 mg/dL    GFR Estimate 72 >60 mL/min/1.73m2    Calcium 9.3 8.8 - 10.2 mg/dL    Glucose 178 (H) 70 - 99 mg/dL   Lactic acid whole blood   Result Value Ref Range    Lactic Acid 1.9 0.7 - 2.0 mmol/L   Lipase   Result Value Ref Range    Lipase 64 (H) 13 - 60 U/L   Troponin T, High Sensitivity   Result Value Ref Range     Troponin T, High Sensitivity 13 <=22 ng/L   CBC with platelets and differential   Result Value Ref Range    WBC Count 8.0 4.0 - 11.0 10e3/uL    RBC Count 4.47 4.40 - 5.90 10e6/uL    Hemoglobin 12.8 (L) 13.3 - 17.7 g/dL    Hematocrit 37.6 (L) 40.0 - 53.0 %    MCV 84 78 - 100 fL    MCH 28.6 26.5 - 33.0 pg    MCHC 34.0 31.5 - 36.5 g/dL    RDW 14.3 10.0 - 15.0 %    Platelet Count 225 150 - 450 10e3/uL    % Neutrophils 83 %    % Lymphocytes 7 %    % Monocytes 9 %    % Eosinophils 1 %    % Basophils 0 %    % Immature Granulocytes 0 %    NRBCs per 100 WBC 0 <1 /100    Absolute Neutrophils 6.7 1.6 - 8.3 10e3/uL    Absolute Lymphocytes 0.5 (L) 0.8 - 5.3 10e3/uL    Absolute Monocytes 0.7 0.0 - 1.3 10e3/uL    Absolute Eosinophils 0.1 0.0 - 0.7 10e3/uL    Absolute Basophils 0.0 0.0 - 0.2 10e3/uL    Absolute Immature Granulocytes 0.0 <=0.4 10e3/uL    Absolute NRBCs 0.0 10e3/uL   Hepatic panel   Result Value Ref Range    Protein Total 6.3 (L) 6.4 - 8.3 g/dL    Albumin 3.1 (L) 3.5 - 5.2 g/dL    Bilirubin Total 1.0 <=1.2 mg/dL    Alkaline Phosphatase 238 (H) 40 - 129 U/L    AST 37 0 - 45 U/L    ALT 47 0 - 70 U/L    Bilirubin Direct 0.42 (H) 0.00 - 0.30 mg/dL   CT Chest/Abdomen/Pelvis w Contrast    Narrative    CT CHEST/ABDOMEN/PELVIS WITH CONTRAST 9/27/2023 1:12 PM    CLINICAL HISTORY: Abdominal pain. History of squamous cell carcinoma  of left tonsil and hepatic metastases, with recent biopsy/ablation.    TECHNIQUE: CT scan of the chest, abdomen, and pelvis was performed  following injection of IV contrast. Multiplanar reformats were  obtained. Dose reduction techniques were used.   CONTRAST: 100 mL Isovue-370    COMPARISON: CT of the abdomen and pelvis performed 10/23/2022. PET/CT  performed 8/30/2022.    FINDINGS:   LUNGS AND PLEURA: Mild scarring or linear atelectasis in the right  lower lobe. No lung masses or consolidations. No pleural effusions.    MEDIASTINUM/AXILLAE: No enlarged lymph nodes in the chest. No  pericardial  effusion. Small hiatal hernia.    CORONARY ARTERY CALCIFICATION: Previous intervention (stents).    HEPATOBILIARY: Lobular hypodense lesions in hepatic segment VIII  measures 6.8 x 6.3 cm and within segment V measures 3 x 2.6 cm, likely  correspond to areas of the reported recent liver ablation procedure.  The larger of these lesions extends beyond the liver capsule, and the  smaller of these lesions abuts the liver margin. Multiple small  gallstones are noted within the gallbladder. There is diffuse  gallbladder wall thickening and enhancement.    PANCREAS: Normal.    SPLEEN: Normal.    ADRENAL GLANDS: Normal.    KIDNEYS/BLADDER: Unremarkable. No hydronephrosis.    BOWEL: No bowel obstruction. Colonic diverticulosis. No convincing  evidence for colitis or diverticulitis. Unremarkable appendix.    PELVIC ORGANS: Prostatectomy.    LYMPH NODES: No enlarged lymph nodes are identified in the abdomen or  pelvis.    VASCULATURE: Mild atherosclerotic aortoiliac calcification. Right  external iliac artery stent appears patent.    ADDITIONAL FINDINGS: None.    MUSCULOSKELETAL: Degenerative changes throughout the thoracolumbar  spine. Thoracolumbar curve. Focal area of ill-defined sclerosis in the  right seventh rib posteriorly (series 7 image 95) is new compared to  8/30/2022.      Impression    IMPRESSION:   1.  Two lobular hypoenhancing right hepatic regions abutting and/or  extending through the liver capsule likely represent the areas of the  recent reported liver ablation procedure.  2.  Cholelithiasis and diffuse gallbladder wall thickening. Acute  cholecystitis could have this appearance. Recommend gallbladder  ultrasound for further evaluation.  3.  Colonic diverticulosis, without evidence for diverticulitis.  4.  Focal area of ill-defined sclerosis in the right seventh rib  posteriorly is new compared to 8/30/2022, and a sclerotic metastatic  lesion is not excluded.    DAMIÁN WESTBROOK MD         SYSTEM ID:   [id number]   UA with Microscopic reflex to Culture    Specimen: Urine, Clean Catch   Result Value Ref Range    Color Urine Yellow Colorless, Straw, Light Yellow, Yellow    Appearance Urine Clear Clear    Glucose Urine Negative Negative mg/dL    Bilirubin Urine Negative Negative    Ketones Urine Negative Negative mg/dL    Specific Gravity Urine 1.026 1.003 - 1.035    Blood Urine Negative Negative    pH Urine 7.0 5.0 - 7.0    Protein Albumin Urine 30 (A) Negative mg/dL    Urobilinogen Urine 4.0 (A) Normal, 2.0 mg/dL    Nitrite Urine Negative Negative    Leukocyte Esterase Urine Negative Negative    Mucus Urine Present (A) None Seen /LPF    RBC Urine <1 <=2 /HPF    WBC Urine 2 <=5 /HPF    Narrative    Urine Culture not indicated   US Abdomen Limited (RUQ)    Narrative    US ABDOMEN LIMITED 9/27/2023 2:30 PM    CLINICAL HISTORY: Abnormal gallbladder noted on CT.  TECHNIQUE: Limited abdominal ultrasound.  COMPARISON: CT of the chest and abdomen performed earlier today.    FINDINGS:  GALLBLADDER: Several small gallstones are noted within the  gallbladder. The gallbladder wall is diffusely thickened at 0.5 cm. No  pericholecystic fluid. The sonographer reports a negative sonographic  Dill's sign.    BILE DUCTS: There is no biliary dilatation. The common duct measures 4  mm. Portions of the common duct could not be visualized due to  overlying bowel gas.    LIVER: Heterogeneously hyperechoic right hepatic lesion measures 5 x  4.4 x 4.1 cm, and likely corresponds to a region of recent reported  liver ablation. No evidence for fatty infiltration of the liver.    RIGHT KIDNEY: Unremarkable. No hydronephrosis.    PANCREAS: Obscured by overlying bowel gas.    No ascites.      Impression    IMPRESSION:  1.  There is cholelithiasis and diffuse gallbladder wall thickening.  Acute cholecystitis cannot be excluded.  2.  Heterogeneously hyperechoic right hepatic mass likely corresponds  to an area of recent liver ablation.  3.   Nonvisualization of the pancreas.      DAMIÁN WESTBROOK MD         SYSTEM ID:  [id number]     *Note: Due to a large number of results and/or encounters for the requested time period, some results have not been displayed. A complete set of results can be found in Results Review.       Medications   sodium chloride 0.9% BOLUS 1,000 mL (0 mLs Intravenous Stopped 9/27/23 1345)   piperacillin-tazobactam (ZOSYN) 3.375 g vial to attach to  mL bag (0 g Intravenous Stopped 9/27/23 1246)   ondansetron (ZOFRAN) injection 4 mg (4 mg Intravenous $Given 9/27/23 1508)   iopamidol (ISOVUE-370) solution 500 mL (100 mLs Intravenous $Given 9/27/23 1259)   sodium chloride 0.9 % bag 100mL for CT scan flush use (60 mLs Intravenous $Given 9/27/23 1258)       Assessments & Plan (with Medical Decision Making)     I have reviewed the nursing notes.    I have reviewed the findings, diagnosis, plan and need for follow up with the patient.    Medical Decision Making  Quan Murphy is a 73 year old male with history of hypertension, CKD, coronary disease, metastatic squamous cell carcinoma s/p chemo/radiation, recent liver ablation presents for evaluation of abdominal pain, nausea and vomiting.  Patient was hypertensive and tachycardic on arrival with heart rate in the 120s.  Borderline tachypneic as well, though he did appear very uncomfortable.  Exam is also significant for frail and somewhat pale appearance.  He is wearing a winter hat despite normal indoor temperatures.  He also has moderate diffuse upper abdominal tenderness with voluntary guarding, no rebound.  Dill sign was equivocal due to significant baseline tenderness.  Lung sounds are clear and he speaks calmly in full sentences.  Pulses are brown.  No edema or calf tenderness appreciated.  Overall his symptoms are suspicious for possible intra-abdominal infection in light of his recent liver ablation.  Cultures and lactic acid were drawn on arrival and Zosyn/fluids were  given injury.  Differential includes intra-abdominal hemorrhage, cholelithiasis/cholecystitis, worsened malignancy, bowel obstruction, pancreatitis, among others.  Cardiac etiology is a possibility given his symptoms but overall seems less likely in the setting of recent abdominal procedure and no reports of chest pain.    Although there was initial concern for sepsis his labs were reassuring overall.  No leukocytosis, only mild anemia, negative lactic acid.  There was mild hypokalemia and CO2 of 14.  Glucose 178.  Alkaline phosphatase was 238.  Direct bilirubin was 0.42.  Lipase 64.  Otherwise electrolytes, transaminases were within normal limits.  Urinalysis largely unremarkable.  Troponin was negative.  CT of the chest, abdomen, pelvis was significant for 2 lobular enhancing right hepatic regions extending to the liver capsule which are likely a product of the recent liver lesion.  Separately he has cholelithiasis and diffuse gallbladder wall thickening of undetermined etiology.  He also has diverticulosis and a known area sclerosis in the right seventh rib which is consistent with his known history.  Follow-up abdominal ultrasound again shows only wall thickening and cholelithiasis without other findings of acute cholecystitis.    The patient's history, exam, and imaging results were discussed with our general surgery team, Dr. Longoria.  Given the patient's history of cancer and recent it is very unclear whether his pain is related to cholelithiasis or other, multifactorial causes.  Recommend obtaining HIDA scan to further assess and then determine possible surgical interventions.  I also discussed case with our hospitalist, Dr. Beckett, and she is agreement with this plan.  However, she did want to ensure that if testing indicated that our surgery team felt comfortable with performing cholecystectomy after proper clearance.  Unfortunately Dr. Longoria will not be on this over the next several days.  He  reviewed case with providers that will be onsite.  They raised several concerns including potential complications related to recent ablation.  If there were to be any bleeding from this site hemostasis could be a challenge with no interventional radiology available to intervene.  They also believe that surgical oncology would be of benefit given his history and therefore recommended transfer to higher level of care.    I discussed this situation at length many times with the patient and his wife, who is actually a retired nurse from our facility.  Patient was ultimately agreeable to transfer internally to [facility], but there are no beds available currently and the soonest this could change is likely tomorrow morning.  We also discussed the possibility of transfer to [facility], as this was the site of his recent ablation.  The patient was adamantly opposed to this idea due to the distance of transfer and his degree of pain currently, despite reassurances that this would be treated and monitored en route.  He was also opposed to other facilities including [facility], and the [facility].  Initially the plan was to board the patient in the emergency department until a bed became available at [facility].  I even worked toward trying to arrange for HIDA scan while still in the ED, though this would have required 8 hours of abstinence from narcotic medications.    Not long after that the patient changed his mind and stated that he did not want to wait for any of this and instead prefers to go home with clinic follow-up.  Both myself and his wife repeatedly expressed concern about this plan because they don't have a scheduled follow-up with their oncologist, [place] radiologist, or even their primary care doctor.  Also emphasized that he required multiple doses of IV narcotics for adequate pain control in the ED.  Despite these conversations and emphasis that he could suffer serious  consequences from leaving, both the patient and his wife signed AMA paperwork after expressing adequate understanding of risk.  Provided new prescriptions for Zofran and Percocet, though was later contacted by pharmacy and he just filled a new prescription for a different narcotic.  Also provided referral to gastroenterology and sent an in basket message to his PCP and attempt to arrange for prompt HIDA scan and outpatient follow-up.  Emphasized that the patient may return at any time if he changes his mind and we will resume care at any time if/when he is to return.    Patient's visit required extensive review of recent medical encounters from 9/19 and 9/23.  I also had several lengthy conversations with specialists and made multiple attempts at transfer.    Discharge Medication List as of 9/27/2023  5:28 PM        START taking these medications    Details   oxyCODONE-acetaminophen (PERCOCET) 5-325 MG tablet Take 1 tablet by mouth every 6 hours as needed for pain, Disp-12 tablet, R-0, Local Print             Final diagnoses:   Upper abdominal pain   Thickening of wall of gallbladder   Symptomatic cholelithiasis   Elevated lipase   Elevated alkaline phosphatase level   Liver lesion       9/27/2023   [facility] EMERGENCY DEPT       Arun Rodney MD  09/27/23 3341

## 2023-09-30 NOTE — TELEPHONE ENCOUNTER
[facility] Emergency Department/Urgent Care Lab result notification  [Note:  ED Lab Results RN will reference the [facility] Emergency Dept visit note prior to contacting patient AND/OR prior to consulting Emergency Dept Provider.  Highlights of Emergency Dept visit in information summary at the bottom of this telephone note]    1. Reason for call  Notify of lab results  Assess patient symptoms [if necessary]  Review ED Providers recommendations/discharge instructions (if necessary)  Advise per [facility] ED lab result protocol    2. Lab Result (including Rx patient on, if applicable).  If culture, copy of lab report at bottom.  Final BLOOD culture on 9/30/23 shows the presence of bacteria(s): Enterococcus hirae  [facility] Emergency Dept discharge antibiotic prescribed: NONE (In ED gave, piperacillin-tazobactam)  Recommendations in Treatment per [facility] ED lab result Blood culture protocol    ED providers advised admission, patient left AMA    3. RN Assessment (Patient's current Symptoms):  Time of call: 9/30/2023 10:50 AM  Assessment: provider spoke with wife regarding blood cultures previously, wife reports he is currently sleeping, he is not eating, muscle aching, patient reporting he is a little better this morning, adamant about not returning to ED  Fever: yesterday temp 100.3 F otherwise has been below that, said afebrile this morning  Heart rate:  no chest pain, palpitations  Breathing:  no concerns, no SOB,   Abdominal pain:  RUQ muscle ache  Nausea/vomiting:  Nausea    4. RN Recommendations/Instructions per [facility] ED lab result protocol  [facility] ED lab result protocol used: Blood culture  Patient's wife was notified of lab result and treatment recommendations  RN reviewed information about recommendations to return to ED per providers, especially if symptoms unchanged or worsening at anytime, acknowledge patient rights of choice, sine patient is refusing  return advised close follow up with PCP/specialty providers at [facility] since they are trying to communicate with them but have been unsuccessful so far, encouraged contacting [facility] for oncall providers consultation or input if this is a possible resource if they are refusing ED presentation.  All questions answered return for worsening fevers, chest pain, palpitations, breathing problems, abdominal pain, nausea/vomiting.  Wife verbalized understanding and agrees with plan.      5. Please Contact your PCP clinic or return to the Emergency department if your:  Symptoms return.  Symptoms do not improve after 3 days on antibiotic.  Symptoms do not resolve after completing antibiotic.  Symptoms worsen or other concerning symptoms.      Copy of Lab report (if applicable)  Blood Culture Peripheral Blood  Order: [id number]  Status: Final result       Visible to patient: Yes (not seen)    Specimen Information: Peripheral Blood   1 Result Note  Culture Positive on the 1st day of incubation Abnormal       Enterococcus hirae Panic    1 of 2 bottles        Resulting Agency: [facility]     Susceptibility    Enterococcus hirae (1)    Antibiotic Interpretation Sensitivity  Method Status    Ampicillin Susceptible 4 ug/mL COLIN Final    Gentamicin Synergy Susceptible Susceptible ug/mL COLIN Final     No high level gentamicin resistance found - therefore combination therapy with an aminoglycoside may be indicated for serious enterococcal infections such as bacteremia and endocarditis.       Vancomycin Susceptible <=0.5 ug/mL COLIN Final          Specimen Collected: 09/27/23 11:55 AM Last Resulted: 09/30/23  7:32 AM               Praful Min RN  [facility] [employer]  Emergency Dept Lab Result RN  Ph# [phone]

## 2023-10-06 NOTE — TELEPHONE ENCOUNTER
No HIDA scan was ordered, order pended for HIDA and General Surgery.    Please route back to me and I will notify patient and help him schedule as needed.    Bri Oshea XRO/        Carmine Rivers,     I saw Mr. Murphy here in the emergency department.  I am sure you are aware of his recent history involving liver lesions/ablation and subsequent visit to the emergency department in [place].  However, he is still having very significant upper abdominal pain and difficulty tolerating p.o. intake.  For these reasons he came back to the emergency department today.  Overall his lab work was fairly reassuring, no evidence of acute sepsis or blood loss.  However, he does have elevated alkaline phosphatase and direct bilirubin.  On CT scan and ultrasound he had evidence of gallbladder wall thickening and stones but no other overt signs of acute cholecystitis.  I spent a great deal of time talking to our hospitalist and surgeon here.  They ultimately did not feel comfortable with admission in the event that he may ultimately require cholecystectomy due to the complications that may arise from it.  I attempted to transfer the patient to several different facilities unsuccessfully, either by limited bed availability or patient preference.  Ultimately the patient and his wife elected to leave AGAINST MEDICAL ADVICE and are trying to arrange for outpatient follow-up.  We were in the process of trying to schedule a HIDA scan for him out of the emergency department but he did not want to wait.  I think he would benefit from this study and further GI/surgery evaluation.  Referrals were provided out of the emergency department but I am hoping you can follow-up with him.  I provided some pain medications and Zofran to help with symptoms in the meantime and of course informed the patient/wife that they may return at any time to resume the work-up/transfer.     Please let me know if you have any questions  or if I can assist in any way.     Thanks,   Arun Rodney

## 2023-10-07 PROBLEM — K81.0 ACUTE CHOLECYSTITIS: Status: ACTIVE | Noted: 2023-01-01

## 2023-10-07 NOTE — PLAN OF CARE
Goal Outcome Evaluation:    Orientation: A&Ox4, lethargic (received ativan in ambulance)  Activity: A1 walker/gait belt  Diet/BS Checks:   Tele: n/a  IV Access/Drains: PIV SL  Pain Management: c/o 5/10 abdominal pain, no meds ordered yet  Abnormal VS/Results: K 2.2, creatinine 2.5, procal 5.93  Bowel/Bladder:   Skin/Wounds: Bruise to L knee, blanchable redness to toes  Consults:   D/C Disposition:   Other Info: Direct admit from [facility] at 1730. 4 of 6 potassium bags completed per RN/EMS.

## 2023-10-07 NOTE — MEDICATION SCRIBE - ADMISSION MEDICATION HISTORY
Medication Scribe Admission Medication History    Admission medication history is complete. The information provided in this note is only as accurate as the sources available at the time of the update.    Information Source(s): Patient, Family member, and CareEverywhere/SureScripts via in-person    Pertinent Information: wife Cheri very helpful    Changes made to PTA medication list:  Added: None  Deleted: Nystatin suspension  Changed: None    Medication Affordability:  Not including over the counter (OTC) medications, was there a time in the past 3 months when you did not take your medications as prescribed because of cost?: Unable to Assess    Allergies reviewed with patient and updates made in EHR: yes    Medication History Completed By: SUJIT JIN 10/7/2023 12:28 PM    PTA Med List   Medication Sig Last Dose    cetirizine (ZYRTEC) 10 MG tablet Take 1 tablet (10 mg) by mouth daily 10/7/2023 at am    citalopram (CELEXA) 40 MG tablet TAKE 1 TABLET BY MOUTH ONCE DAILY 10/7/2023 at am    DULoxetine (CYMBALTA) 60 MG capsule Take 1 capsule (60 mg) by mouth daily 10/7/2023 at am    famotidine (PEPCID) 20 MG tablet Take 20 mg by mouth 2 times daily as needed (heartburn) 10/6/2023 at hs    levothyroxine (SYNTHROID/LEVOTHROID) 100 MCG tablet Take 100 mcg by mouth daily 10/7/2023 at am    metoprolol succinate ER (TOPROL XL) 50 MG 24 hr tablet Take 0.5 tablets (25 mg) by mouth daily 10/7/2023 at am    naloxone (NARCAN) 4 MG/0.1ML nasal spray Spray 1 spray (4 mg) into one nostril alternating nostrils as needed for opioid reversal every 2-3 minutes until assistance arrives Unknown at unkn    nitroGLYcerin (NITROSTAT) 0.4 MG sublingual tablet For chest pain place 1 tablet under the tongue every 5 minutes for 3 doses. If symptoms persist 5 minutes after 1st dose call 911. Unknown at on hand    omeprazole (PRILOSEC) 40 MG DR capsule Take 1 capsule (40 mg) by mouth daily (Patient taking differently: Take 40 mg by mouth daily as  needed (heartburn)) Past Month at unkn    ondansetron (ZOFRAN ODT) 4 MG ODT tab Take 1 tablet (4 mg) by mouth every 8 hours as needed for nausea 10/6/2023 at hs    oxyCODONE (ROXICODONE) 5 MG tablet Take 1-2 tablets (5-10 mg) by mouth every 6 hours as needed for severe pain (Limit to 6 pills per day) 10/7/2023 at 0730    pregabalin (LYRICA) 150 MG capsule TAKE 1 CAPSULE (150 MG) BY MOUTH 2 TIMES DAILY 10/7/2023 at am    rosuvastatin (CRESTOR) 40 MG tablet Take 1 tablet (40 mg) by mouth daily 10/7/2023 at am    zolpidem (AMBIEN) 5 MG tablet TAKE 1 TABLET (5 MG) BY MOUTH NIGHTLY AS NEEDED FOR SLEEP 10/6/2023 at hs

## 2023-10-07 NOTE — H&P
[facility]    History and Physical - Hospitalist Service       Date of Admission:  10/7/2023    Assessment & Plan      Quan Murphy is a 73 year old male with metastatic squamous cell carcinoma of tonsils, s/p chemoradiation, recent ablation of liver metastasis on 9/19/2023, CAD, hypertension, hyperlipidemia    He presented to [facility] ER on 9/27 with fever and abdominal pain.  Ultrasound showed acute cholecystitis with cholelithiasis.  He declined admission and left AMA.  Blood culture grew Enterococcus hirae sensitive to ampicillin.  He did not receive antibiotics for this.  Multiple attempts were made to contact the patient but were unsuccessful.  His abdominal pain subsequently resolved.    He presented to [facility] ER again on 10/7 with 1 week of diarrhea, nausea, anorexia, poor oral intake 10 pound weight loss in 1 week generalized weakness, dizziness and recurrent falls.      Work-up showed severe hypokalemia with potassium of 2.2 acute kidney injury with creatinine of 2.5.  There was concern that his symptoms are likely related to acute cholecystitis with cholelithiasis.  He was felt to be a high risk patient and was thus transferred to [facility] for general surgery evaluation.  Plan is for general surgery to evaluate him and then decide on next course of action-conservative management versus surgery versus cholecystostomy tube placement by IR.    Cholelithiasis with cholecystitis (acute vs chronic)  Enterococcus hirae bacteremia, 9/27/2023  - Ultrasound abdomen 10/7 showed cholelithiasis with increased wall thickening compared to 9/27.  No pericholecystic fluid and negative sonographic Dill's sign.  Findings equivocal for acute cholecystitis.  HIDA scan recommended.  Post ablation changes in liver.  -Procalcitonin 5.93.  Lactate normal at 1.3.  No leukocytosis.  INR 1.28  -Nontender in right upper quadrant  -Mild elevation in LFTs-alkaline phosphatase 254,  ALT 43, AST 49, total bilirubin 0.5  -Enterococcal bacteremia likely secondary to cholecystitis.  This is sensitive to ampicillin.  Will continue IV Unasyn  -Consult general surgery.  Further management will be as per general surgery's recommendations  -Pain control with Tylenol, IV/p.o. Dilaudid  -Zofran for nausea or vomiting  -IV fluids  -Repeat blood cultures were obtained at [facility]    Acute kidney injury  Mild anion gap metabolic acidosis  - creatinine of 2.5, up from baseline of 1.  This is prerenal   -Administer IV fluids-LR +20 mEq KCl at 100 mL/h   -Avoid nephrotoxic medications  -Monitor labs    Severe hypokalemia, K 2.2   -Due to poor oral intake, nausea and diarrhea   -Administer IV fluids-LR +20 mEq KCl at 100 mL/h   -Place on potassium protocol   -Check magnesium and phosphorous and replace as needed     Diarrhea  -Reports after 2 episodes of loose stools per day.  Reports was treated with antibiotics recently but does not remember for what reason.  -Check for C. difficile    Acute metabolic encephalopathy  -Somewhat confused, has difficulty answering questions, keeps falling asleep  -This is likely secondary to acute illness and really dehydration with  -Supportive management and IV fluids.    Metastatic squamous cell carcinoma of tonsil with metastasis to right seventh rib and liver, diagnosed 3/2021, s/p chemoradiation, s/p recent ablation of liver metastasis on 9/19/2023  -Followed by [facility]    CAD, s/p stenting of LAD in 2016   -Stable, no chest pain.  Continue metoprolol and rosuvastatin      Hypertension  Dyslipidemia  -Continue Toprol-XL, rosuvastatin    Generalized weakness  Recurrent falls  -Secondary to hypovolemia and acute illness  -Consult PT/OT  - consult for discharge planning        Diet:  Full liquid diet for now.  N.p.o. after midnight  DVT Prophylaxis: Pneumatic Compression Devices  Stone Catheter: Not present  Lines: None     Cardiac Monitoring:  "None  Code Status:  Full code    Clinically Significant Risk Factors Present on Admission        # Hypokalemia: Lowest K = 2.2 mmol/L in last 2 days, will replace as needed       # Hypoalbuminemia: Lowest albumin = 3.2 g/dL at 10/7/2023  9:53 AM, will monitor as appropriate  # Coagulation Defect: INR = 1.28 (Ref range: 0.85 - 1.15) and/or PTT = 35 Seconds (Ref range: 22 - 38 Seconds), will monitor for bleeding   # Acute Kidney Injury, unspecified: based on a >150% or 0.3 mg/dL increase in last creatinine compared to past 90 day average, will monitor renal function  # Hypertension: Noted on problem list      # Overweight: Estimated body mass index is 27.52 kg/m  as calculated from the following:    Height as of an earlier encounter on 10/7/23: 1.727 m (5' 8\").    Weight as of an earlier encounter on 10/7/23: 82.1 kg (181 lb).              Disposition Plan           Jessica Lemus MD  Hospitalist Service  [facility]  Securely message with prollie (more info)  Text page via [facility] Paging/Directory     ______________________________________________________________________    Chief Complaint     Generalized weakness, recurrent falls, diarrhea, anorexia, weight loss    History is obtained from the patient    History of Present Illness       Quan Murphy is a 73 year old male with history of metastatic squamous cell carcinoma of tonsils, s/p chemoradiation, recent ablation of liver metastasis on 9/19/2023, CAD, hypertension, hyperlipidemia.    He developed acute upper abdominal pain about a week ago and and presented to [facility] ER on 9/27 with fever.  Work-up showed cholelithiasis with probable acute cholecystitis.  He declined admission and left AMA.  Subsequently blood culture obtained on 9/27 grew Enterococcus hirae sensitive to ampicillin.  Multiple attempts were made to contact the patient but were unsuccessful.  His abdominal pain subsequently resolved.    In this past 1 week, he has " been doing poorly.  Reports he has diarrhea about 2 times a day.  He has some nausea but no vomiting.  He has anorexia.  He is not able to maintain oral intake.  Reports he has lost 10 pounds in 7 days.  He is feeling weak, dizzy and lightheaded and has had several falls.    Due to these concerns, he presented to [facility] ER.  He denies abdominal pain at this time.  His symptoms were thought to be due to cholelithiasis.  General surgery at [facility] felt patient was too complex to be managed there.  His gallbladder is slightly stuck to the liver due to recent ablation.  He was thus transferred to [facility] for general surgery evaluation.  Plan is for general surgery to evaluate him and then decide on next course of action-conservative management versus surgery versus cholecystostomy tube placement by IR.    His work-up also showed acute kidney injury with creatinine of 2.5, severe hypokalemia with potassium of 2.2 and mild anion gap acidosis.    He does not have leukocytosis, lactate is normal but has elevated procalcitonin of 5.9.  LFTs are only mildly elevated and bilirubin is normal.    Ultrasound obtained today showed cholelithiasis with increased gallbladder wall thickening compared to 9/27 but no pericholecystic fluid and negative sonographic Dill sign.    He was administered IV Unasyn.    He appears to be mildly confused, falls asleep between sentences, unable to answer some questions.      Past Medical History    Past Medical History:   Diagnosis Date    Allergic rhinitis     Allergy, unspecified not elsewhere classified     Seasonal allergies, pollen, dust, smoke and animals    Antiplatelet or antithrombotic long-term use     Anxiety     Arthritis     Chest pain     Chronic sinusitis     Coronary atherosclerosis of unspecified type of vessel, native or graft     Coronary artery disease    Depressive disorder 1995    Gastroesophageal reflux disease 2020    Cleared with medication     Head injury 1954    Hiatal hernia 2015    Right Side    History of blood transfusion 12/15/2004    Prostate Surgery - My own blood    Hyperlipidemia     Hypertension     Inguinal hernia     Kidney problem 10/08/2017    Lithotripsy    Kidney stones     Malignant neoplasm of prostate (H)     Prostate cancer    Prostate cancer (H)     Syncope     Tonsil cancer (H)        Past Surgical History   Past Surgical History:   Procedure Laterality Date    ARTHRODESIS FOOT  7/23/2013    Procedure: ARTHRODESIS FOOT;  Great Toe Arthrodesis Left Foot;  Surgeon: Ash Gonzalez DPM;  Location: [facility] OR    ARTHRODESIS FOOT  6/10/2014    Procedure: ARTHRODESIS FOOT;  Surgeon: Ash Gonzalez DPM;  Location: [facility] OR    BIOPSY  10/1/2004    dermatologist biopsies    COLONOSCOPY  10/7/2013    Procedure: COLONOSCOPY;  Colonoscopy;  Surgeon: Mike Fallon MD;  Location: [facility] GI    CYSTOSCOPY N/A 2/16/2022    Procedure: CYSTOSCOPY and bladder stone removal;  Surgeon: David Rogers MD;  Location: [facility] OR    ESOPHAGOSCOPY, GASTROSCOPY, DUODENOSCOPY (EGD), COMBINED N/A 2/12/2020    Procedure: ESOPHAGOGASTRODUODENOSCOPY (EGD);  Surgeon: Sam Escobar MD;  Location: [facility]    EXTRACORPOREAL SHOCK WAVE LITHOTRIPSY (ESWL) Bilateral 10/18/2017    Procedure: EXTRACORPOREAL SHOCK WAVE LITHOTRIPSY (ESWL);  BILATERAL EXTRACORPOREAL SHOCKWAVE LITHOTRIPSY ;  Surgeon: Meir Torres MD;  Location: [facility] OR    HC CORRECT BUNION,SIMPLE  08/11/2005    x3    HC REMV TOE BENIGN BONE LESN  08/11/2005    HEAD & NECK SURGERY  1954    From a fall    HERNIORRHAPHY INGUINAL  7/3/2013    Procedure: HERNIORRHAPHY INGUINAL;  Open Repair Inguinal hernia Right with mesh ;  Surgeon: Sam Escobar MD;  Location: [facility] OR    IR GASTROSTOMY TUBE PERCUTANEOUS PLCMNT  6/7/2021    MOHS MICROGRAPHIC PROCEDURE  08/23/11    ear and chin-[facility]    OPEN REDUCTION INTERNAL FIXATION WRIST Right 7/18/2017    Procedure: OPEN REDUCTION INTERNAL FIXATION  WRIST;  Right distal radius open reduction and internal fixation;  Surgeon: Pedro Blanca DO;  Location: [facility] OR    RECONSTRUCT FOREFOOT WITH METATARSOPHALANGEAL (MTP) FUSION  6/10/2014    Procedure: RECONSTRUCT FOREFOOT WITH METATARSOPHALANGEAL (MTP) FUSION;  Surgeon: Ash Gonzalez DPM;  Location: [facility] OR    STENT, CORONARY, DEMI      SURGICAL HISTORY OF -   1999/1974    lt knee    SURGICAL HISTORY OF -   10/2004    lithotripsy    SURGICAL HISTORY OF -   11/05    angiogram with stent    VASCULAR SURGERY  11/17/2005    Puncture of iliac artery during and angiogram    [facility] LAPAROSCOPY, SURGICAL PROSTATECTOMY, RETROPUBIC RADICAL, W/NERVE SPARING  11/30/2004    With full bilateral pelvic lymphadenectomy.  [facility].    [facility] TOTAL KNEE ARTHROPLASTY  05/01/08    Left knee       Prior to Admission Medications   Prior to Admission Medications   Prescriptions Last Dose Informant Patient Reported? Taking?   DULoxetine (CYMBALTA) 60 MG capsule 10/7/2023 at AM  No Yes   Sig: Take 1 capsule (60 mg) by mouth daily   cetirizine (ZYRTEC) 10 MG tablet 10/7/2023 at AM  No Yes   Sig: Take 1 tablet (10 mg) by mouth daily   citalopram (CELEXA) 40 MG tablet 10/7/2023 at AM  No Yes   Sig: TAKE 1 TABLET BY MOUTH ONCE DAILY   famotidine (PEPCID) 20 MG tablet 10/6/2023 at HS  Yes Yes   Sig: Take 20 mg by mouth 2 times daily as needed (heartburn)   levothyroxine (SYNTHROID/LEVOTHROID) 100 MCG tablet 10/7/2023 at AM  Yes Yes   Sig: Take 100 mcg by mouth daily   metoprolol succinate ER (TOPROL XL) 50 MG 24 hr tablet 10/7/2023 at AM  No Yes   Sig: Take 0.5 tablets (25 mg) by mouth daily   naloxone (NARCAN) 4 MG/0.1ML nasal spray  at PRN  No Yes   Sig: Spray 1 spray (4 mg) into one nostril alternating nostrils as needed for opioid reversal every 2-3 minutes until assistance arrives   nitroGLYcerin (NITROSTAT) 0.4 MG sublingual tablet  at PRN  No Yes   Sig: For chest pain place 1 tablet under the tongue every 5 minutes for 3 doses. If  symptoms persist 5 minutes after 1st dose call 911.   omeprazole (PRILOSEC) 40 MG DR capsule  at PRN  No Yes   Sig: Take 1 capsule (40 mg) by mouth daily   Patient taking differently: Take 40 mg by mouth daily as needed (heartburn)   ondansetron (ZOFRAN ODT) 4 MG ODT tab 10/6/2023 at PM  No Yes   Sig: Take 1 tablet (4 mg) by mouth every 8 hours as needed for nausea   oxyCODONE (ROXICODONE) 5 MG tablet 10/7/2023 at AM  No Yes   Sig: TAKE 1-2 TABLETS (5-10 MG) BY MOUTH EVERY 6 HOURS AS NEEDED FOR SEVERE PAIN (LIMIT TO 6 TABLETS PER DAY)   Patient taking differently: Take 5-10 mg by mouth every 6 hours as needed for severe pain (max 6 tablets in 24 hours)   pregabalin (LYRICA) 150 MG capsule 10/7/2023 at AM  No Yes   Sig: TAKE 1 CAPSULE (150 MG) BY MOUTH 2 TIMES DAILY   rosuvastatin (CRESTOR) 40 MG tablet 10/7/2023 at AM  No Yes   Sig: Take 1 tablet (40 mg) by mouth daily   zolpidem (AMBIEN) 5 MG tablet 10/6/2023 at HS  No Yes   Sig: TAKE 1 TABLET (5 MG) BY MOUTH NIGHTLY AS NEEDED FOR SLEEP      Facility-Administered Medications: None        Social History   I have reviewed this patient's social history and updated it with pertinent information if needed.  Social History     Tobacco Use    Smoking status: Never    Smokeless tobacco: Never   Vaping Use    Vaping Use: Never used   Substance Use Topics    Alcohol use: Yes     Alcohol/week: 10.0 standard drinks of alcohol     Types: 10 Cans of beer per week     Comment: 1 hard  liquid and 1 wine or beer per day    Drug use: No         Family History   I have reviewed this patient's family history and updated it with pertinent information if needed.  Family History   Problem Relation Age of Onset    Hypertension Father         Lived to age 87    Connective Tissue Disorder Mother         LUPUS    Heart Disease Mother         Valve replacement    Anxiety Disorder Mother         Lived to age 84    Dementia Mother         Nursing Home (lived to age 86)        Physical Exam    Vital Signs: Temp: 97.6  F (36.4  C) Temp src: Oral BP: 122/72 Pulse: 64   Resp: 16 SpO2: 97 % O2 Device: None (Room air)    Weight: 0 lbs 0 oz    Constitutional - alert, resting in bed, appears comfortable  Head - normocephalic, atraumatic  ENT - normal eye lids and lashes, no conjunctival hyperemia, no icterus, extraocular movements are normal, normal nose, no discharge, moist oral mucosa, no ulcers or exudates, normal external ear  Neck - no thyromegaly or lymphadenopathy. Tracheal is midline  CV - regular rate and rhythm, no murmurs, no edema  Pulmonary - lungs are clear to auscultation bilaterally, no wheezing or rhonchi  GI - abdomen is soft, non distended, non tender, bowel sounds are present, no organomegaly  Neurological - alert and oriented, normal speech, no focal deficits  Musculoskeletal - no joint erythema or swelling, ROM is ok          Medical Decision Making       75 MINUTES SPENT BY ME on the date of service doing chart review, history, exam, documentation & further activities per the note.      Data     I have personally reviewed the following data over the past 24 hrs:    7.4  \   12.6 (L)   / 298     137 105 20.1 /  91   2.2 (LL) 15 (L) 2.50 (H) \     ALT: 43 AST: 49 (H) AP: 254 (H) TBILI: 0.5   ALB: 3.2 (L) TOT PROTEIN: 6.4 LIPASE: 102 (H)     Procal: 5.93 (HH) CRP: N/A Lactic Acid: 1.3       INR:  1.28 (H) PTT:  35   D-dimer:  N/A Fibrinogen:  N/A       Imaging results reviewed over the past 24 hrs:   Recent Results (from the past 24 hour(s))   US Abdomen Limited (RUQ)    Narrative    EXAM: US ABDOMEN LIMITED  [facility]  DATE: 10/7/2023    INDICATION: ruq abd pain, recent concern for cholecystitis  COMPARISON: Ultrasound 09/27/2023, CT abdomen pelvis 09/27/2023  TECHNIQUE: Limited abdominal ultrasound.    FINDINGS:    GALLBLADDER: Multiple shadowing gallstones are present with wall thickening (up to 8 mm). No pericholecystic fluid. Negative  sonographic Dill's.    BILE DUCTS: No biliary dilatation. The common duct measures 7 mm.    LIVER: Heterogeneously hyperechoic areas within the liver are similar compared to recent CT accounting for differences in technique, corresponding to ablation zones.     RIGHT KIDNEY: No hydronephrosis.    PANCREAS: The visualized portions are normal.    No ascites.      Impression    IMPRESSION:  1. Cholelithiasis with increased wall thickening compared to 09/27/2023, however no pericholecystic fluid and negative sonographic Dill's. Findings are equivocal for acute cholecystitis. Consider HIDA scan if continued clinical concern.  2. Postablation changes in the liver.

## 2023-10-07 NOTE — PHARMACY-ADMISSION MEDICATION HISTORY
Pharmacist Admission Medication History    Admission medication history compiled 100% from [facility] notes and MAR.      Medication History Completed By: Quan Lawton [facility] 10/7/2023 5:34 PM    PTA Med List   Medication Sig Last Dose    cetirizine (ZYRTEC) 10 MG tablet Take 1 tablet (10 mg) by mouth daily 10/7/2023 at AM    citalopram (CELEXA) 40 MG tablet TAKE 1 TABLET BY MOUTH ONCE DAILY 10/7/2023 at AM    DULoxetine (CYMBALTA) 60 MG capsule Take 1 capsule (60 mg) by mouth daily 10/7/2023 at AM    famotidine (PEPCID) 20 MG tablet Take 20 mg by mouth 2 times daily as needed (heartburn) 10/6/2023 at HS    levothyroxine (SYNTHROID/LEVOTHROID) 100 MCG tablet Take 100 mcg by mouth daily 10/7/2023 at AM    metoprolol succinate ER (TOPROL XL) 50 MG 24 hr tablet Take 0.5 tablets (25 mg) by mouth daily 10/7/2023 at AM    naloxone (NARCAN) 4 MG/0.1ML nasal spray Spray 1 spray (4 mg) into one nostril alternating nostrils as needed for opioid reversal every 2-3 minutes until assistance arrives  at PRN    nitroGLYcerin (NITROSTAT) 0.4 MG sublingual tablet For chest pain place 1 tablet under the tongue every 5 minutes for 3 doses. If symptoms persist 5 minutes after 1st dose call 911.  at PRN    omeprazole (PRILOSEC) 40 MG DR capsule Take 1 capsule (40 mg) by mouth daily (Patient taking differently: Take 40 mg by mouth daily as needed (heartburn))  at PRN    ondansetron (ZOFRAN ODT) 4 MG ODT tab Take 1 tablet (4 mg) by mouth every 8 hours as needed for nausea 10/6/2023 at PM    oxyCODONE (ROXICODONE) 5 MG tablet TAKE 1-2 TABLETS (5-10 MG) BY MOUTH EVERY 6 HOURS AS NEEDED FOR SEVERE PAIN (LIMIT TO 6 TABLETS PER DAY) (Patient taking differently: Take 5-10 mg by mouth every 6 hours as needed for severe pain (max 6 tablets in 24 hours)) 10/7/2023 at AM    pregabalin (LYRICA) 150 MG capsule TAKE 1 CAPSULE (150 MG) BY MOUTH 2 TIMES DAILY 10/7/2023 at AM    rosuvastatin (CRESTOR) 40 MG tablet Take 1 tablet (40 mg) by mouth daily  10/7/2023 at AM    zolpidem (AMBIEN) 5 MG tablet TAKE 1 TABLET (5 MG) BY MOUTH NIGHTLY AS NEEDED FOR SLEEP 10/6/2023 at HS

## 2023-10-07 NOTE — ED TRIAGE NOTES
Patient reports falling at about 0730. Denies LOC but thinks he may have hit his head. Pt is pale, SBP 76, reports poor nutrition, took Oxycodone at home prior to coming to ED.      Triage Assessment       Row Name 10/07/23 0905       Triage Assessment (Adult)    Airway WDL WDL       Respiratory WDL    Respiratory WDL WDL       Skin Circulation/Temperature WDL    Skin Circulation/Temperature WDL WDL       Cardiac WDL    Cardiac WDL WDL       Peripheral/Neurovascular WDL    Peripheral Neurovascular WDL WDL       Cognitive/Neuro/Behavioral WDL    Cognitive/Neuro/Behavioral WDL WDL

## 2023-10-07 NOTE — ED PROVIDER NOTES
History     Chief Complaint   Patient presents with    Fall     HPI  Quan Murphy is a 73 year old male who presents with increasing falls and weakness, weight loss, loss of appetitie.  Pt has PMHx significant for:  history of hypertension, CKD, coronary disease, metastatic squamous cell carcinoma s/p chemo/radiation, recent liver ablation.  Patient has a history of metastatic HPV related squamous cell carcinoma. This was initially diagnosed in 2021 and was treated with chemoradiotherapy. He developed recurrence in 2022 and again had a good response to chemotherapy with exception of a metastases to the right seventh rib. More recently the patient was found to have evidence of an isolated liver metastasis and he was subsequently treated with an ablation through [facility] on 9/19.  Patient was here at the end of September for some upper abdominal pain and found to have a thickened gallbladder with concerns of acute cholecystitis.  Patient's blood cultures did come back positive.  Patient ultimately left AMA.  Patient was called multiple times and told to return to the emergency department but did not want to return.  Today,  Patient comes back in because he has been falling more at home.  This morning he was sitting on the toilet just leaned too far forward and fell forward onto his knees and may have hit his head.  Patient is concerned because he is lost a lot of weight over the last couple weeks, he is barely been able to eat much of anything.  He denies any pain just does not have much of an appetite.  Denies any new fevers or chills.  He states that his belly pain is not as bad as when he was in the ER 2 weeks ago.  Denies any new fevers or chills.  Patient had a bowel movement yesterday.  Denies any dysuria or hematuria.    Allergies:  Allergies   Allergen Reactions    Animal Dander     Azithromycin Nausea and Vomiting    Dust Mites     Pollen Extract     Smoke.        Problem List:    Patient Active  Problem List    Diagnosis Date Noted    Hypothyroidism due to acquired atrophy of thyroid 03/09/2023     Priority: Medium    Malignant neoplasm metastatic to bone (H) 01/17/2023     Priority: Medium    Pancytopenia (H) 08/16/2022     Priority: Medium    Uncomplicated opioid dependence (H)      Priority: Medium    Chronic kidney disease, stage 3 (H) 06/15/2021     Priority: Medium    Failure to thrive (0-17) 06/02/2021     Priority: Medium     Replacing diagnoses that were inactivated after the 10/1/2021 regulatory import.      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     Priority: Medium    Hypomagnesemia 04/13/2021     Priority: Medium    Hiatal hernia 02/17/2020     Priority: Medium    Renal hematoma 10/28/2017     Priority: Medium    Retroperitoneal hematoma 10/28/2017     Priority: Medium    Syncope 10/18/2017     Priority: Medium    S/P ORIF (open reduction internal fixation) fracture 08/03/2017     Priority: Medium    Closed Colles' fracture of right radius with routine healing, subsequent encounter 08/03/2017     Priority: Medium    Status post insertion of drug-eluting stent into left anterior descending artery for coronary artery disease 01/05/2017     Priority: Medium    Chest pain 12/24/2016     Priority: Medium    Arthropathy 02/04/2014     Priority: Medium     Problem list name updated by automated process. Provider to review      Coronary atherosclerosis      Priority: Medium     Coronary artery disease  Problem list name updated by automated process. Provider to review      Hallux rigidus 07/16/2013     Priority: Medium    Inguinal hernia 06/28/2013     Priority: Medium    Degenerative arthritis of foot 06/28/2013     Priority: Medium    Hypertension goal BP (blood pressure) < 140/90 06/12/2012     Priority: Medium    Hyperlipidemia LDL goal <130 12/22/2011     Priority: Medium    Anxiety 11/02/2011     Priority: Medium    Allergic conjunctivitis 07/08/2008     Priority: Medium    Sleep  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, insomnia type 11/17/2006     Priority: Medium     Problem list name updated by automated process. Provider to review      Acute reaction to stress 01/12/2005     Priority: Medium     Problem list name updated by automated process. Provider to review      Chronic maxillary sinusitis 01/12/2005     Priority: Medium    Contracture of palmar fascia 01/12/2005     Priority: Medium    Benign neoplasm of skin of other and unspecified parts of face 01/12/2005     Priority: Medium    Malignant neoplasm of prostate (H) 11/26/2004     Priority: Medium        Past Medical History:    Past Medical History:   Diagnosis Date    Allergic rhinitis     Allergy, unspecified not elsewhere classified     Antiplatelet or antithrombotic long-term use     Anxiety     Arthritis     Chest pain     Chronic sinusitis     Coronary atherosclerosis of unspecified type of vessel, native or graft     Depressive disorder 1995    Gastroesophageal reflux disease 2020    Head injury 1954    Hiatal hernia 2015    History of blood transfusion 12/15/2004    Hyperlipidemia     Hypertension     Inguinal hernia     Kidney problem 10/08/2017    Kidney stones     Malignant neoplasm of prostate (H)     Prostate cancer (H)     Syncope     Tonsil cancer (H)        Past Surgical History:    Past Surgical History:   Procedure Laterality Date    ARTHRODESIS FOOT  7/23/2013    Procedure: ARTHRODESIS FOOT;  Great Toe Arthrodesis Left Foot;  Surgeon: Ash Gonzalez DPM;  Location: [facility]    ARTHRODESIS FOOT  6/10/2014    Procedure: ARTHRODESIS FOOT;  Surgeon: Ash Gonzalez DPM;  Location: [facility]    BIOPSY  10/1/2004    dermatologist biopsies    COLONOSCOPY  10/7/2013    Procedure: COLONOSCOPY;  Colonoscopy;  Surgeon: Mike Fallon MD;  Location: [facility]    CYSTOSCOPY N/A 2/16/2022    Procedure: CYSTOSCOPY and bladder stone removal;  Surgeon: David Rogers MD;  Location: [facility]    ESOPHAGOSCOPY, GASTROSCOPY,  DUODENOSCOPY (EGD), COMBINED N/A 2/12/2020    Procedure: ESOPHAGOGASTRODUODENOSCOPY (EGD);  Surgeon: Sam Escobar MD;  Location: [facility]    EXTRACORPOREAL SHOCK WAVE LITHOTRIPSY (ESWL) Bilateral 10/18/2017    Procedure: EXTRACORPOREAL SHOCK WAVE LITHOTRIPSY (ESWL);  BILATERAL EXTRACORPOREAL SHOCKWAVE LITHOTRIPSY ;  Surgeon: Meir Torres MD;  Location: [facility] OR    HC CORRECT BUNION,SIMPLE  08/11/2005    x3    HC REMV TOE BENIGN BONE LESN  08/11/2005    HEAD & NECK SURGERY  1954    From a fall    HERNIORRHAPHY INGUINAL  7/3/2013    Procedure: HERNIORRHAPHY INGUINAL;  Open Repair Inguinal hernia Right with mesh ;  Surgeon: Sam Escobar MD;  Location: [facility]    IR GASTROSTOMY TUBE PERCUTANEOUS PLCMNT  6/7/2021    MOHS MICROGRAPHIC PROCEDURE  08/23/11    ear and chin-[facility]    OPEN REDUCTION INTERNAL FIXATION WRIST Right 7/18/2017    Procedure: OPEN REDUCTION INTERNAL FIXATION WRIST;  Right distal radius open reduction and internal fixation;  Surgeon: Pedro Blanca DO;  Location: [facility]    RECONSTRUCT FOREFOOT WITH METATARSOPHALANGEAL (MTP) FUSION  6/10/2014    Procedure: RECONSTRUCT FOREFOOT WITH METATARSOPHALANGEAL (MTP) FUSION;  Surgeon: Ash Gonzalez DPM;  Location: [facility]    STENT, CORONARY, DEMI      SURGICAL HISTORY OF -   1999/1974    lt knee    SURGICAL HISTORY OF -   10/2004    lithotripsy    SURGICAL HISTORY OF -   11/05    angiogram with stent    VASCULAR SURGERY  11/17/2005    Puncture of iliac artery during and angiogram    [facility] LAPAROSCOPY, SURGICAL PROSTATECTOMY, RETROPUBIC RADICAL, W/NERVE SPARING  11/30/2004    With full bilateral pelvic lymphadenectomy.  [facility].    [facility] TOTAL KNEE ARTHROPLASTY  05/01/08    Left knee       Family History:    Family History   Problem Relation Age of Onset    Hypertension Father         Lived to age 87    Connective Tissue Disorder Mother         LUPUS    Heart Disease Mother         Valve replacement    Anxiety Disorder Mother   "       Lived to age 84    Dementia Mother         Nursing Home (lived to age 86)       Social History:  Marital Status:   [2]  Social History     Tobacco Use    Smoking status: Never    Smokeless tobacco: Never   Vaping Use    Vaping Use: Never used   Substance Use Topics    Alcohol use: Yes     Alcohol/week: 10.0 standard drinks of alcohol     Types: 10 Cans of beer per week     Comment: 1 hard  liquid and 1 wine or beer per day    Drug use: No        Medications:    aspirin (ASA) 325 MG tablet  cetirizine (ZYRTEC) 10 MG tablet  citalopram (CELEXA) 40 MG tablet  DULoxetine (CYMBALTA) 60 MG capsule  famotidine (PEPCID) 20 MG tablet  levothyroxine (SYNTHROID/LEVOTHROID) 100 MCG tablet  metoprolol succinate ER (TOPROL XL) 50 MG 24 hr tablet  naloxone (NARCAN) 4 MG/0.1ML nasal spray  nitroGLYcerin (NITROSTAT) 0.4 MG sublingual tablet  nystatin (MYCOSTATIN) 196818 UNIT/ML suspension  omeprazole (PRILOSEC) 40 MG DR capsule  ondansetron (ZOFRAN ODT) 4 MG ODT tab  oxyCODONE (ROXICODONE) 5 MG tablet  oxyCODONE (ROXICODONE) 5 MG tablet  oxyCODONE (ROXICODONE) 5 MG tablet  pregabalin (LYRICA) 150 MG capsule  rosuvastatin (CRESTOR) 40 MG tablet  zolpidem (AMBIEN) 5 MG tablet          Review of Systems   All other systems reviewed and are negative.      Physical Exam   BP: (!) 76/62  Pulse: 69  Temp: 97.6  F (36.4  C)  Resp: 20  Height: 172.7 cm (5' 8\")  Weight: 82.1 kg (181 lb)  SpO2: 100 %      Physical Exam  Vitals and nursing note reviewed.   Constitutional:       General: He is not in acute distress.     Appearance: He is well-developed. He is not diaphoretic.   HENT:      Head: Normocephalic and atraumatic.      Nose: Nose normal.      Mouth/Throat:      Mouth: Mucous membranes are dry.      Pharynx: No oropharyngeal exudate.   Eyes:      General: No scleral icterus.     Conjunctiva/sclera: Conjunctivae normal.      Pupils: Pupils are equal, round, and reactive to light.      Comments: Pinpoint pupils   "   Cardiovascular:      Rate and Rhythm: Normal rate and regular rhythm.      Heart sounds: Normal heart sounds. No murmur heard.     No friction rub.   Pulmonary:      Effort: Pulmonary effort is normal. No respiratory distress.      Breath sounds: Normal breath sounds. No wheezing or rales.   Abdominal:      General: Bowel sounds are normal. There is no distension.      Palpations: Abdomen is soft. There is no mass.      Tenderness: There is abdominal tenderness (RUQ). There is no guarding or rebound.   Musculoskeletal:         General: No tenderness. Normal range of motion.      Cervical back: Normal range of motion.   Skin:     General: Skin is warm and dry.      Capillary Refill: Capillary refill takes less than 2 seconds.      Findings: No rash.   Neurological:      Mental Status: He is alert and oriented to person, place, and time.   Psychiatric:         Judgment: Judgment normal.         ED Course               EKG Interpretation:      Interpreted by Travis Ferris  Time reviewed: now   Symptoms at time of EKG: now   Rhythm: normal sinus   Rate: normal  Axis: NORMAL  Ectopy: none  Conduction: normal  ST Segments/ T Waves: No ST-T wave changes  Q Waves: none  Comparison to prior: No old EKG available    Clinical Impression: normal EKG         Procedures           Results for orders placed or performed during the hospital encounter of 10/07/23 (from the past 24 hour(s))   CBC with platelets differential    Narrative    The following orders were created for panel order CBC with platelets differential.  Procedure                               Abnormality         Status                     ---------                               -----------         ------                     CBC with platelets and d...[[id number]]  Abnormal            Final result                 Please view results for these tests on the individual orders.   INR   Result Value Ref Range    INR 1.28 (H) 0.85 - 1.15   Partial thromboplastin  time   Result Value Ref Range    aPTT 35 22 - 38 Seconds   Basic metabolic panel   Result Value Ref Range    Sodium 137 135 - 145 mmol/L    Potassium 2.2 (LL) 3.4 - 5.3 mmol/L    Chloride 105 98 - 107 mmol/L    Carbon Dioxide (CO2) 15 (L) 22 - 29 mmol/L    Anion Gap 17 (H) 7 - 15 mmol/L    Urea Nitrogen 20.1 8.0 - 23.0 mg/dL    Creatinine 2.50 (H) 0.67 - 1.17 mg/dL    GFR Estimate 27 (L) >60 mL/min/1.73m2    Calcium 9.3 8.8 - 10.2 mg/dL    Glucose 91 70 - 99 mg/dL   Hepatic function panel   Result Value Ref Range    Protein Total 6.4 6.4 - 8.3 g/dL    Albumin 3.2 (L) 3.5 - 5.2 g/dL    Bilirubin Total 0.5 <=1.2 mg/dL    Alkaline Phosphatase 254 (H) 40 - 129 U/L    AST 49 (H) 0 - 45 U/L    ALT 43 0 - 70 U/L    Bilirubin Direct <0.20 0.00 - 0.30 mg/dL   Lipase   Result Value Ref Range    Lipase 102 (H) 13 - 60 U/L   Lactic acid whole blood   Result Value Ref Range    Lactic Acid 1.3 0.7 - 2.0 mmol/L   Procalcitonin   Result Value Ref Range    Procalcitonin 5.93 (HH) <0.05 ng/mL   Magnesium   Result Value Ref Range    Magnesium 2.3 1.7 - 2.3 mg/dL   CBC with platelets and differential   Result Value Ref Range    WBC Count 7.4 4.0 - 11.0 10e3/uL    RBC Count 4.53 4.40 - 5.90 10e6/uL    Hemoglobin 12.6 (L) 13.3 - 17.7 g/dL    Hematocrit 36.6 (L) 40.0 - 53.0 %    MCV 81 78 - 100 fL    MCH 27.8 26.5 - 33.0 pg    MCHC 34.4 31.5 - 36.5 g/dL    RDW 15.3 (H) 10.0 - 15.0 %    Platelet Count 298 150 - 450 10e3/uL    % Neutrophils 70 %    % Lymphocytes 14 %    % Monocytes 15 %    Mids % (Monos, Eos, Basos)      % Eosinophils 1 %    % Basophils 0 %    % Immature Granulocytes 0 %    NRBCs per 100 WBC 0 <1 /100    Absolute Neutrophils 5.1 1.6 - 8.3 10e3/uL    Absolute Lymphocytes 1.0 0.8 - 5.3 10e3/uL    Absolute Monocytes 1.1 0.0 - 1.3 10e3/uL    Mids Abs (Monos, Eos, Basos)      Absolute Eosinophils 0.1 0.0 - 0.7 10e3/uL    Absolute Basophils 0.0 0.0 - 0.2 10e3/uL    Absolute Immature Granulocytes 0.0 <=0.4 10e3/uL    Absolute  NRBCs 0.0 10e3/uL   [facility] Draw    Narrative    The following orders were created for panel order [facility].  Procedure                               Abnormality         Status                     ---------                               -----------         ------                     Extra Blood Culture Bottle[[id number]]                       Final result                 Please view results for these tests on the individual orders.   Extra Blood Culture Bottle   Result Value Ref Range    Hold Specimen      Narrative    Ok       *Note: Due to a large number of results and/or encounters for the requested time period, some results have not been displayed. A complete set of results can be found in Results Review.       Medications   sodium chloride 0.9% BOLUS 1,000 mL (1,000 mLs Intravenous $New Bag 10/7/23 1105)   sodium chloride 0.9 % infusion (has no administration in time range)   ampicillin-sulbactam (UNASYN) 3 g vial to attach to  mL bag (has no administration in time range)     This is a 73-year-old male who comes in with increasing falls and weakness, weight loss and loss of appetite and some recent diarrhea.  Patient's had an extensive medical history including a recent liver ablation because of a liver met and subsequently was here 2 weeks ago and found to have significant thickening of the gallbladder and worries about acute cholecystitis.  Patient had a positive blood culture but ended up leaving AMA.  Upon arrival here today patient is afebrile and initial blood pressure was somewhat low, subsequent blood pressures have been 90 systolic.  Lab work was obtained and patient has significant hypokalemia which I think explains his weakness.  Patient's creatinine is also up.  I think both of these are combination of the diarrhea that the patient has been having and significantly decreased oral intake.  I am concerned that the loss of appetite is likely from this acute/chronic cholecystitis.  I consulted  our general surgeon here, Dr. Clark and she, reiterated the same plan that Dr. Longoria had mentioned 2 weeks ago that they do not think that this is someone that we can manage here.  They are really concerned about that recent ablation and that the gallbladder is going to likely be stuck in that area.  Patient may benefit from interventional radiology from just having a drain placed or bigger facility where this more complicated surgery could be done.  This certainly could explain some of the patient's loss of appetite and other symptoms.  At this point I think it would be beneficial to transfer the patient  To have the potassium and creatinine corrected with fluid hydration and potassium.  I also think having this gallbladder looked at further is important.  I Katerine go ahead and obtain blood cultures and will give a dose of Unasyn to cover for probable chronic cholecystitis.  Patient's lactic was normal and white count was normal but procalcitonin was elevated.  I discussed the case with the hospitalist at [facility], will accept the patient in transfer patient be transferred at this time    Assessments & Plan (with Medical Decision Making)  Chronic cholecystitis, hypokalemia, weakness, diarrhea     I have reviewed the nursing notes.    I have reviewed the findings, diagnosis, plan and need for follow up with the patient.      New Prescriptions    No medications on file       Final diagnoses:   Chronic cholecystitis   Hypokalemia   Weakness   Diarrhea, unspecified type       10/7/2023   [facility] EMERGENCY DEPT       Travis Ferris MD  10/07/23 1403       Travis Ferris MD  10/13/23 [id number]

## 2023-10-08 NOTE — PROGRESS NOTES
[facility]    Medicine Progress Note - Hospitalist Service    Date of Admission:  10/7/2023  Date of Service: 10/08/2023    Assessment & Plan      uQan Murphy is a 73 year old male with metastatic squamous cell carcinoma of tonsils, s/p chemoradiation, recent ablation of liver metastasis on 9/19/2023, CAD, hypertension, hyperlipidemia    He presented to [facility] ER on 9/27 with fever and abdominal pain.  Ultrasound showed acute cholecystitis with cholelithiasis.  He declined admission and left AMA.  Blood culture grew Enterococcus hirae sensitive to ampicillin.  He did not receive antibiotics for this.  Multiple attempts were made to contact the patient but were unsuccessful.  His abdominal pain subsequently resolved.    He presented to [facility] ER again on 10/7 with 1 week of diarrhea, nausea, anorexia, poor oral intake 10 pound weight loss in 1 week generalized weakness, dizziness and recurrent falls.      Work-up showed severe hypokalemia with potassium of 2.2 acute kidney injury with creatinine of 2.5.  There was concern that his symptoms are likely related to acute cholecystitis with cholelithiasis.  He was felt to be a high risk patient and was thus transferred to [facility] for general surgery evaluation.  Plan is for general surgery to evaluate him and then decide on next course of action-conservative management versus surgery versus cholecystostomy tube placement by IR.    Cholelithiasis with cholecystitis (acute vs chronic)  Enterococcus hirae bacteremia, 9/27/2023  - Ultrasound abdomen 10/7 showed cholelithiasis with increased wall thickening compared to 9/27.  No pericholecystic fluid and negative sonographic Dill's sign.  Findings equivocal for acute cholecystitis.  HIDA scan recommended.  Post ablation changes in liver.  -Procalcitonin 5.93.  Lactate normal at 1.3.  No leukocytosis.  INR 1.28  -Nontender in right upper quadrant  -Mild elevation in  LFTs-alkaline phosphatase 254, ALT 43, AST 49, total bilirubin 0.5  Plan:  -Enterococcal bacteremia likely secondary to cholecystitis -> sensitive to ampicillin.  Will continue IV Unasyn  -Consulted general surgery.  Further management will be as per general surgery's recommendations  -Pain control with Tylenol, IV/p.o. Dilaudid  -Zofran for nausea or vomiting  -Continue IV fluids  -Follow BCs    Acute kidney injury  Mild anion gap metabolic acidosis  -Creatinine of 2.5, up from baseline of 1.  This is prerenal   -IV fluids-LR +20 mEq KCl at 100 mL/h   -Avoid nephrotoxic medications  -Monitor labs    Severe hypokalemia, K 2.2   -Due to poor oral intake, nausea and diarrhea   -Administer IV fluids-LR +20 mEq KCl at 100 mL/h   -Placed on potassium protocol   -Follow magnesium and phosphorous and replace as needed     Diarrhea  -Reports after 2 episodes of loose stools per day.  Reports was treated with antibiotics recently but does not remember for what reason.  -Check for C. Difficile -> pending     Acute metabolic encephalopathy  -Somewhat confused, has difficulty answering questions, keeps falling asleep  -This is likely secondary to acute illness and really dehydration with  -Supportive management and IV fluids.    Metastatic squamous cell carcinoma of tonsil with metastasis to right seventh rib and liver, diagnosed 3/2021, s/p chemoradiation, s/p recent ablation of liver metastasis on 9/19/2023  -Followed by [facility]    CAD, s/p stenting of LAD in 2016   -Stable, no chest pain.  Continue metoprolol and rosuvastatin    Hypertension  Dyslipidemia  -Continue Toprol-XL, rosuvastatin    Generalized weakness  Recurrent falls  -Secondary to hypovolemia and acute illness  -Consult PT/OT  - consult for discharge planning    Diet:  NPO   DVT Prophylaxis: Pneumatic Compression Devices  Stone Catheter: Not present  Lines: None     Cardiac Monitoring: None  Code Status:  Full code       Diet: NPO per  "Anesthesia Guidelines for Procedure/Surgery Except for: Meds    DVT Prophylaxis: Pneumatic Compression Devices  Stone Catheter: Not present  Lines: None     Cardiac Monitoring: None  Code Status: Full Code      Clinically Significant Risk Factors Present on Admission        # Hypokalemia: Lowest K = 2.2 mmol/L in last 2 days, will replace as needed    # Hypercalcemia: corrected calcium is >10.1, will monitor as appropriate    # Hypoalbuminemia: Lowest albumin = 2.9 g/dL at 10/8/2023  7:20 AM, will monitor as appropriate    # Coagulation Defect: INR = 1.28 (Ref range: 0.85 - 1.15) and/or PTT = 35 Seconds (Ref range: 22 - 38 Seconds), will monitor for bleeding     # Acute Kidney Injury, unspecified: based on a >150% or 0.3 mg/dL increase in last creatinine compared to past 90 day average, will monitor renal function    # Hypertension: Noted on problem list      # Overweight: Estimated body mass index is 27.52 kg/m  as calculated from the following:    Height as of this encounter: 1.727 m (5' 8\").    Weight as of this encounter: 82.1 kg (181 lb).              Disposition Plan      Expected Discharge Date: 10/09/2023                  Kevin Kiran MD  Hospitalist Service  [facility]  Securely message with [employer] (more info)  Text page via [employer] Paging/Directory   ______________________________________________________________________    Interval History     No acute events overnight  No CP/SOB  No nausea / vomiting or abdominal pain  No new fevers  No new complaints  Wife very concerned about cognitive decline.     Physical Exam   Vital Signs: Temp: 97.9  F (36.6  C) Temp src: Oral BP: 114/69 Pulse: 65   Resp: 16 SpO2: 98 % O2 Device: None (Room air)    Weight: 181 lbs 0 oz    Constitutional - alert, resting in bed, appears comfortable  CV - regular rate and rhythm, no murmurs, no edema  Pulmonary - lungs are clear to auscultation bilaterally, no wheezing or rhonchi  GI - abdomen is soft, non " distended, non tender, bowel sounds are present, no organomegaly  Neurological - alert and oriented, normal speech, no focal deficits  Musculoskeletal - no joint erythema or swelling, ROM is ok     ----------------------------------------------------------------------------------------    Medical Decision Making       50 MINUTES SPENT BY ME on the date of service doing chart review, history, exam, documentation & further activities per the note.      Data   ------------------------- PAST 24 HR DATA REVIEWED -----------------------------------------------    I have personally reviewed the following data over the past 24 hrs:    9.7  \   12.0 (L)   / 301     142 115 (H) 17.2 /  82   2.6 (LL) 15 (L) 2.21 (H) \     ALT: 32 AST: 35 AP: 231 (H) TBILI: 0.4   ALB: 2.9 (L) TOT PROTEIN: 6.1 (L) LIPASE: N/A     Procal: N/A CRP: 30.12 (H) Lactic Acid: N/A       Imaging results reviewed over the past 24 hrs:   No results found for this or any previous visit (from the past 24 hour(s)).  ------------------------- ENCOUNTER LABS ----------------------------------------------------------------  Recent Labs   Lab 10/08/23  0720 10/07/23  2056 10/07/23  0953   WBC 9.7  --  7.4   HGB 12.0*  --  12.6*   MCV 83  --  81     --  298   INR  --   --  1.28*     --  137   POTASSIUM 2.6* 2.4* 2.2*   CHLORIDE 115*  --  105   CO2 15*  --  15*   BUN 17.2  --  20.1   CR 2.21*  --  2.50*   ANIONGAP 12  --  17*   LATRELL 9.1  --  9.3   GLC 82  --  91   ALBUMIN 2.9*  --  3.2*   PROTTOTAL 6.1*  --  6.4   BILITOTAL 0.4  --  0.5   ALKPHOS 231*  --  254*   ALT 32  --  43   AST 35  --  49*   LIPASE  --   --  102*       Most Recent 3 CBC's:  Recent Labs   Lab Test 10/08/23  0720 10/07/23  0953 09/27/23  1155   WBC 9.7 7.4 8.0   HGB 12.0* 12.6* 12.8*   MCV 83 81 84    298 225     Most Recent 3 BMP's:  Recent Labs   Lab Test 10/08/23  0720 10/07/23  2056 10/07/23  0953 09/27/23  1155     --  137 139   POTASSIUM 2.6* 2.4* 2.2* 3.3*    CHLORIDE 115*  --  105 109*   CO2 15*  --  15* 14*   BUN 17.2  --  20.1 11.5   CR 2.21*  --  2.50* 1.09   ANIONGAP 12  --  17* 16*   LATRELL 9.1  --  9.3 9.3   GLC 82  --  91 178*     Most Recent 2 LFT's:  Recent Labs   Lab Test 10/08/23  0720 10/07/23  0953   AST 35 49*   ALT 32 43   ALKPHOS 231* 254*   BILITOTAL 0.4 0.5     Most Recent 3 INR's:  Recent Labs   Lab Test 10/07/23  0953 09/27/23  1155 09/14/22  1006   INR 1.28* 1.29* 0.98     Most Recent 3 Troponin's:  Recent Labs   Lab Test 06/04/21  0624 12/24/16  1020 12/24/16  0325   TROPI 0.035 <0.015  The 99th percentile for upper reference range is 0.045 ug/L.  Troponin values in   the range of 0.045 - 0.120 ug/L may be associated with risks of adverse   clinical events.   <0.015  The 99th percentile for upper reference range is 0.045 ug/L.  Troponin values in   the range of 0.045 - 0.120 ug/L may be associated with risks of adverse   clinical events.       Most Recent 3 BNP's:No lab results found.  Most Recent D-dimer:  Recent Labs   Lab Test 12/23/16  2130   DD 0.3     Most Recent 6 Bacteria Isolates From Any Culture (See EPIC Reports for Culture Details):  Recent Labs   Lab Test 06/04/21  1104 06/04/21  0744 05/27/21  1031 05/27/21  1015 05/27/21  0950 05/12/21  2304   CULT Moderate growth  Normal manuel    Light growth  beta hemolytic   Streptococcus anginosus  This organism is susceptible to ampicillin, penicillin, vancomycin and the cephalosporins.   If treatment is required AND your patient is allergic to penicillin, contact the   Microbiology Lab within 5 days to request susceptibility testing.  * No growth No growth No growth No growth No growth     Most Recent Urinalysis:  Recent Labs   Lab Test 10/07/23  1328 10/28/17  1500 01/18/16  1457   COLOR Yellow   < > Yellow   APPEARANCE Clear   < > Clear   URINEGLC Negative   < > Negative   URINEBILI Negative   < > Negative   URINEKETONE Negative   < > Negative   SG 1.005   < > 1.015   UBLD Negative   < >  Negative   URINEPH 7.0   < > 7.0   PROTEIN 100*   < > Negative   UROBILINOGEN  --   --  0.2   NITRITE Negative   < > Negative   LEUKEST Negative   < > Negative   RBCU 1   < >  --    WBCU 0   < >  --     < > = values in this interval not displayed.

## 2023-10-08 NOTE — PROVIDER NOTIFICATION
"Pagekennedy CARL-Meenakshi Meyer at 2100 on pt K lab.    \"K is 2.2 from last recheck on today AM when pt arrived. RN didnt replace the potassium. Do you want us to recheck before we replace with the protocol?\"  "

## 2023-10-08 NOTE — PROVIDER NOTIFICATION
MD Notification    Notified Person: MD    Notified Person Name: Fortino Kiran    Notification Date/Time: 10/8/2023 1630    Notification Interaction: vocera message    Purpose of Notification:   Pt is oriented x4, but very confused at times. Neuro exam done with difficulty following commands and slow to respond at times. Noting some tremors/jerking and keeps dropping things. His pupils are small but reactive.    Orders Received: MRI ordered    Comments:  MRI checklist completed with wife over the phone

## 2023-10-08 NOTE — PLAN OF CARE
Orientation: A&Ox4, fprgetful  Activity:A1, GBW  Diet/BS Checks: NPO status initiated at midnight  Tele:N/A  IV Access/Drains: L PIV infusing LR+20mEq/L KCL at 100ml/hr  Pain Management: Denies pain this shift  Abnormal VS/Results: VSS on RA.  K+ = 2.4,  Protocol replaced overnight, recheck at 0700  Bowel/Bladder: Continent of B/B, pt using Urinal at bedside with good UOP. No BM this shift.   Skin/Wounds: Blister to L knee, blanchable redness to toes  Consults: Gen surgery, PT, OT, SW  D/C Disposition: Discharge plan pending  Other Info:

## 2023-10-08 NOTE — PROGRESS NOTES
Summary: [id number]   10/07/23  Admitting Diagnosis:Abdominal pain, nausea, diarrhea, loose of appetite.    Orientation:A&O x4, forgetive @times    Vitals/Tele:VSS on RA    IV Access/drains: L PIV Kcl infusing @100ml/hr    Diet:NPO @midnight    Mobility: SBA GB-    GI/:Continent of B/B    Wound/Skin:blanchable redness to toes, blister to L knee & scattered bruises.    Consults:    Discharge Plan:TBD  Other Important Info: NOVA 2.2.Paged MD for lab redraw before running the K protocol.    See Flow sheets for assessment

## 2023-10-08 NOTE — CONSULTS
General Surgery Consultation      Quan Murphy MRN# [id number]   YOB: 1950 Age: 73 year old   Date of Admission: 10/7/2023     Reason for consult: I was asked by Dr. Lemus to evaluate this patient for concerns of cholecystitis.           Assessment and Plan:    SURESH  Hypokalemia  Diarrhea  Metastatic squamous cell CA to liver and ribs - s/p recent ablation  CAD    Concerns for cholecystitis    He had a liver lesion ablation September 19 of this year.  Last week of September he presented to an outside hospital ED with concerns of abdominal pain, evaluation to rule out gallbladder origin was cut short as he left AMA.  An ultrasound was done and this showed some potential thickening of the gallbladder wall without any pericholecystic fluid and with negative Dill sign.  He was home until yesterday he returned to the emergency room as he fell at home and was found to be severely dehydrated with acute kidney injury and hypokalemia.  He has been having diarrhea and is currently being rule out for C. difficile colitis.  There has been no additional vomiting.  He feels somewhat hungry.  White blood cell count is normal    Should obtain a HIDA scan prior to any definitive procedure (either cholecystostomy tube versus cholecystectomy).  Should an operation become necessary obtaining a CT scan of the abdomen might be of benefit to get a sense of what the ablation has done to the anatomy of his liver in relationship to the gallbladder, deciding then what will be the best hospital for him to undergo any type of surgery.           Chief Complaint:   Concerns for cholecystitis as the root of his current problems.    History is obtained from the patient, electronic health record, and emergency department physician         History of Present Illness:   This patient is a 73 year old male who presents with above-mentioned situation.  Still having diarrhea, no vomiting.                Past Medical History:     Past  Medical History:   Diagnosis Date    Allergic rhinitis     Allergy, unspecified not elsewhere classified     Seasonal allergies, pollen, dust, smoke and animals    Antiplatelet or antithrombotic long-term use     Anxiety     Arthritis     Chest pain     Chronic sinusitis     Coronary atherosclerosis of unspecified type of vessel, native or graft     Coronary artery disease    Depressive disorder 1995    Gastroesophageal reflux disease 2020    Cleared with medication    Head injury 1954    Hiatal hernia 2015    Right Side    History of blood transfusion 12/15/2004    Prostate Surgery - My own blood    Hyperlipidemia     Hypertension     Inguinal hernia     Kidney problem 10/08/2017    Lithotripsy    Kidney stones     Malignant neoplasm of prostate (H)     Prostate cancer    Prostate cancer (H)     Syncope     Tonsil cancer (H)              Past Surgical History:     Past Surgical History:   Procedure Laterality Date    ARTHRODESIS FOOT  7/23/2013    Procedure: ARTHRODESIS FOOT;  Great Toe Arthrodesis Left Foot;  Surgeon: Ash Gonzalez DPM;  Location: [facility] OR    ARTHRODESIS FOOT  6/10/2014    Procedure: ARTHRODESIS FOOT;  Surgeon: Ash Gonzalez DPM;  Location: [facility] OR    BIOPSY  10/1/2004    dermatologist biopsies    COLONOSCOPY  10/7/2013    Procedure: COLONOSCOPY;  Colonoscopy;  Surgeon: Miek Fallon MD;  Location: [facility] GI    CYSTOSCOPY N/A 2/16/2022    Procedure: CYSTOSCOPY and bladder stone removal;  Surgeon: David Rogesr MD;  Location: [facility] OR    ESOPHAGOSCOPY, GASTROSCOPY, DUODENOSCOPY (EGD), COMBINED N/A 2/12/2020    Procedure: ESOPHAGOGASTRODUODENOSCOPY (EGD);  Surgeon: Sam Escobar MD;  Location: [facility]    EXTRACORPOREAL SHOCK WAVE LITHOTRIPSY (ESWL) Bilateral 10/18/2017    Procedure: EXTRACORPOREAL SHOCK WAVE LITHOTRIPSY (ESWL);  BILATERAL EXTRACORPOREAL SHOCKWAVE LITHOTRIPSY ;  Surgeon: Meir Torres MD;  Location:  OR    HC CORRECT BUNION,SIMPLE  08/11/2005    x3    HC REMV  TOE BENIGN BONE LESN  08/11/2005    HEAD & NECK SURGERY  1954    From a fall    HERNIORRHAPHY INGUINAL  7/3/2013    Procedure: HERNIORRHAPHY INGUINAL;  Open Repair Inguinal hernia Right with mesh ;  Surgeon: Sam Escobar MD;  Location: [facility] OR    IR GASTROSTOMY TUBE PERCUTANEOUS PLCMNT  6/7/2021    MOHS MICROGRAPHIC PROCEDURE  08/23/11    ear and chin-[facility]    OPEN REDUCTION INTERNAL FIXATION WRIST Right 7/18/2017    Procedure: OPEN REDUCTION INTERNAL FIXATION WRIST;  Right distal radius open reduction and internal fixation;  Surgeon: Pedro Blanca DO;  Location: [facility]    RECONSTRUCT FOREFOOT WITH METATARSOPHALANGEAL (MTP) FUSION  6/10/2014    Procedure: RECONSTRUCT FOREFOOT WITH METATARSOPHALANGEAL (MTP) FUSION;  Surgeon: Ash Gonzalez DPM;  Location: [facility] OR    STENT, CORONARY, DEMI      SURGICAL HISTORY OF -   1999/1974    lt knee    SURGICAL HISTORY OF -   10/2004    lithotripsy    SURGICAL HISTORY OF -   11/05    angiogram with stent    VASCULAR SURGERY  11/17/2005    Puncture of iliac artery during and angiogram    [facility] LAPAROSCOPY, SURGICAL PROSTATECTOMY, RETROPUBIC RADICAL, W/NERVE SPARING  11/30/2004    With full bilateral pelvic lymphadenectomy.  [facility].    [facility] TOTAL KNEE ARTHROPLASTY  05/01/08    Left knee               Social History:   Accompanied by his wife in the room          Family History:     Family History   Problem Relation Age of Onset    Hypertension Father         Lived to age 87    Connective Tissue Disorder Mother         LUPUS    Heart Disease Mother         Valve replacement    Anxiety Disorder Mother         Lived to age 84    Dementia Mother         Nursing Home (lived to age 86)             Immunizations:     Immunization History   Administered Date(s) Administered    COVID-19 Monovalent 18+ (Moderna) 02/05/2021, 03/05/2021, 08/31/2021    COVID-19 Monovalent Booster 18+ (Moderna) 06/02/2022    FLU 6-35 months 12/03/2009    Flu, Unspecified 11/13/2002     W0q1-64 Novel Flu 01/21/2010    HepB 06/12/2009, 07/13/2009, 01/21/2010    Hepatitis B, Adult 06/12/2009, 07/13/2009, 01/21/2010    Influenza (High Dose) 3 valent vaccine 10/12/2016, 10/09/2017, 10/08/2018, 09/23/2019    Influenza (IIV3) PF 10/20/1995, 10/15/1997, 01/12/2005, 11/11/2005, 11/07/2006, 10/18/2007, 10/14/2010, 09/29/2011, 09/26/2012, 09/24/2014    Influenza Vaccine 65+ (Fluzone HD) 09/21/2020, 09/17/2021, 10/27/2022    Influenza Vaccine >6 months (Alfuria,Fluzone) 10/03/2013, 10/09/2015    Pneumo Conj 13-V (2010&after) 10/09/2017    Pneumococcal 23 valent 10/12/2016    TD,PF 7+ (Tenivac) 09/17/2006    TDAP Vaccine (Boostrix) 12/13/2016    Zoster recombinant adjuvanted (SHINGRIX) 08/07/2019, 02/18/2020    Zoster vaccine, live 02/03/2016             Allergies:     Allergies   Allergen Reactions    Animal Dander     Azithromycin Nausea and Vomiting    Dust Mites     Pollen Extract     Smoke.              Medications:     Medications Prior to Admission   Medication Sig Dispense Refill Last Dose    cetirizine (ZYRTEC) 10 MG tablet Take 1 tablet (10 mg) by mouth daily 30 tablet 1 10/7/2023 at AM    citalopram (CELEXA) 40 MG tablet TAKE 1 TABLET BY MOUTH ONCE DAILY 90 tablet 0 10/7/2023 at AM    DULoxetine (CYMBALTA) 60 MG capsule Take 1 capsule (60 mg) by mouth daily 90 capsule 3 10/7/2023 at AM    famotidine (PEPCID) 20 MG tablet Take 20 mg by mouth 2 times daily as needed (heartburn)   10/6/2023 at HS    levothyroxine (SYNTHROID/LEVOTHROID) 100 MCG tablet Take 100 mcg by mouth daily   10/7/2023 at AM    metoprolol succinate ER (TOPROL XL) 50 MG 24 hr tablet Take 0.5 tablets (25 mg) by mouth daily 90 tablet 3 10/7/2023 at AM    naloxone (NARCAN) 4 MG/0.1ML nasal spray Spray 1 spray (4 mg) into one nostril alternating nostrils as needed for opioid reversal every 2-3 minutes until assistance arrives 0.2 mL 0  at PRN    nitroGLYcerin (NITROSTAT) 0.4 MG sublingual tablet For chest pain place 1 tablet under  the tongue every 5 minutes for 3 doses. If symptoms persist 5 minutes after 1st dose call 911. 25 tablet 0  at PRN    omeprazole (PRILOSEC) 40 MG DR capsule Take 1 capsule (40 mg) by mouth daily (Patient taking differently: Take 40 mg by mouth daily as needed (heartburn)) 90 capsule 3  at PRN    ondansetron (ZOFRAN ODT) 4 MG ODT tab Take 1 tablet (4 mg) by mouth every 8 hours as needed for nausea 10 tablet 0 10/6/2023 at PM    oxyCODONE (ROXICODONE) 5 MG tablet TAKE 1-2 TABLETS (5-10 MG) BY MOUTH EVERY 6 HOURS AS NEEDED FOR SEVERE PAIN (LIMIT TO 6 TABLETS PER DAY) (Patient taking differently: Take 5-10 mg by mouth every 6 hours as needed for severe pain (max 6 tablets in 24 hours)) 120 tablet 0 10/7/2023 at AM    pregabalin (LYRICA) 150 MG capsule TAKE 1 CAPSULE (150 MG) BY MOUTH 2 TIMES DAILY 120 capsule 1 10/7/2023 at AM    rosuvastatin (CRESTOR) 40 MG tablet Take 1 tablet (40 mg) by mouth daily 90 tablet 3 10/7/2023 at AM    zolpidem (AMBIEN) 5 MG tablet TAKE 1 TABLET (5 MG) BY MOUTH NIGHTLY AS NEEDED FOR SLEEP 30 tablet 0 10/6/2023 at HS             Review of Systems:     The 5 point Review of Systems is negative other than noted in the HPI            Physical Exam:   Vitals were reviewed  Temp: 97.9  F (36.6  C) Temp src: Oral BP: 114/69 Pulse: 65   Resp: 16 SpO2: 98 % O2 Device: None (Room air)    Constitutional:   fatigued, alert, cooperative, no apparent distress, appears stated age, and mildly obese     Eyes:   sclera clear     Lungs:   no increased work of breathing, good air exchange, and no retractions     Abdomen:   soft, non-distended, and tenderness noted in the epigastric region     Musculoskeletal:   tone is normal     Skin:   normal skin color, texture, turgor          Data:   All laboratory and imaging data in the past 24 hours reviewed

## 2023-10-09 NOTE — PROGRESS NOTES
[facility]    Medicine Progress Note - Hospitalist Service    Date of Admission:  10/7/2023  Date of Service: 10/09/2023    Assessment & Plan      Quan Murphy is a 73 year old male with metastatic squamous cell carcinoma of tonsils, s/p chemoradiation, recent ablation of liver metastasis on 9/19/2023, CAD, hypertension, hyperlipidemia    He presented to [facility] ER on 9/27 with fever and abdominal pain.  Ultrasound showed acute cholecystitis with cholelithiasis.  He declined admission and left AMA.  Blood culture grew Enterococcus hirae sensitive to ampicillin.  He did not receive antibiotics for this.  Multiple attempts were made to contact the patient but were unsuccessful.  His abdominal pain subsequently resolved.    He presented to [facility] ER again on 10/7 with 1 week of diarrhea, nausea, anorexia, poor oral intake 10 pound weight loss in 1 week generalized weakness, dizziness and recurrent falls.      Work-up showed severe hypokalemia with potassium of 2.2 acute kidney injury with creatinine of 2.5.  There was concern that his symptoms are likely related to acute cholecystitis with cholelithiasis.  He was felt to be a high risk patient and was thus transferred to [facility] for general surgery evaluation.  Plan is for general surgery to evaluate him and then decide on next course of action-conservative management versus surgery versus cholecystostomy tube placement by IR.    Cholelithiasis with cholecystitis (acute vs chronic)  Enterococcus hirae bacteremia, 9/27/2023  - Ultrasound abdomen 10/7 showed cholelithiasis with increased wall thickening compared to 9/27.  No pericholecystic fluid and negative sonographic Dill's sign.  Findings equivocal for acute cholecystitis.  HIDA scan recommended.  Post ablation changes in liver.  -Procalcitonin 5.93.  Lactate normal at 1.3.  No leukocytosis.  INR 1.28  -Nontender in right upper quadrant  -Mild elevation in  LFTs-alkaline phosphatase 254, ALT 43, AST 49, total bilirubin 0.5  Plan:  -Enterococcal bacteremia likely secondary to cholecystitis -> sensitive to ampicillin.  Will continue IV Unasyn -> augmentin at discharge  -Consulted general surgery.  Further management will be as per general surgery's recommendations -> HIDA scan was Negative for acute cholecystitis and biliary dyskinesia.   -Pain control with Tylenol, IV/p.o. Dilaudid  -Zofran for nausea or vomiting  -Can continue IV fluids and advance diet  -Follow BCs -> NGTD  -PT / OT eval -> [facility]     Acute kidney injury  Mild anion gap metabolic acidosis  -Creatinine of 2.5, up from baseline of 1.  This is likely prerenal   -IV fluids-LR +20 mEq KCl at 100 mL/h  -> can stop tomorrow pending Cr  -Avoid nephrotoxic medications  -Monitor labs    Severe hypokalemia, K 2.2   -Due to poor oral intake, nausea and diarrhea   -Administer IV fluids-LR +20 mEq KCl at 100 mL/h   -Placed on potassium protocol   -Follow magnesium and phosphorous and replace as needed     Diarrhea  -Reports after 2 episodes of loose stools per day.  Reports was treated with antibiotics recently but does not remember for what reason.  -Check for C. Difficile -> negative     Acute metabolic encephalopathy  -Somewhat confused, has difficulty answering questions, keeps falling asleep  -This is likely secondary to acute illness and really dehydration with  -Supportive management and IV fluids.  -MRI Brain negative for acute pathology  -Mentation is improving    Metastatic squamous cell carcinoma of tonsil with metastasis to right seventh rib and liver, diagnosed 3/2021, s/p chemoradiation, s/p recent ablation of liver metastasis on 9/19/2023  -Followed by [facility]    CAD, s/p stenting of LAD in 2016   -Stable, no chest pain.  Continue metoprolol and rosuvastatin    Hypertension  Dyslipidemia  -Continue Toprol-XL, rosuvastatin    Generalized weakness  Recurrent falls  -Secondary to hypovolemia and acute  "illness  -Consult PT/OT  - consult for discharge planning    Diet:  NPO   DVT Prophylaxis: Pneumatic Compression Devices  Stone Catheter: Not present  Lines: None     Cardiac Monitoring: None  Code Status:  Full code       Diet: Advance Diet as Tolerated: Clear Liquid Diet  Snacks/Supplements Adult: Other; ok for diet appropriate supplements; Between Meals    DVT Prophylaxis: Pneumatic Compression Devices  Stone Catheter: Not present  Lines: None     Cardiac Monitoring: None  Code Status: Full Code      Clinically Significant Risk Factors        # Hypokalemia: Lowest K = 2.4 mmol/L in last 2 days, will replace as needed       # Hypoalbuminemia: Lowest albumin = 2.9 g/dL at 10/8/2023  7:20 AM, will monitor as appropriate    # Coagulation Defect: INR = 1.28 (Ref range: 0.85 - 1.15) and/or PTT = 35 Seconds (Ref range: 22 - 38 Seconds), will monitor for bleeding     # Acute Kidney Injury, unspecified: based on a >150% or 0.3 mg/dL increase in last creatinine compared to past 90 day average, will monitor renal function    # Hypertension: Noted on problem list        # Overweight: Estimated body mass index is 27.52 kg/m  as calculated from the following:    Height as of this encounter: 1.727 m (5' 8\").    Weight as of this encounter: 82.1 kg (181 lb)., PRESENT ON ADMISSION  # Severe Malnutrition: based on nutrition assessment, PRESENT ON ADMISSION          Disposition Plan      Expected Discharge Date: 10/10/2023                  Kevin Kiran MD  Hospitalist Service  [facility]  Securely message with Currently (more info)  Text page via [employer] Paging/Directory   ______________________________________________________________________    Interval History     No acute events overnight  No CP/SOB  No nausea / vomiting or abdominal pain  No new fevers  No new complaints  Anxious for discharge home    Physical Exam   Vital Signs: Temp: 98.1  F (36.7  C) Temp src: Oral BP: 138/83 Pulse: 69   " Resp: 17 SpO2: 95 % O2 Device: None (Room air)    Weight: 181 lbs 0 oz    Constitutional - alert, resting in bed, appears comfortable  CV - regular rate and rhythm, no murmurs, no edema  Pulmonary - lungs are clear to auscultation bilaterally, no wheezing or rhonchi  GI - abdomen is soft, non distended, non tender, bowel sounds are present, no organomegaly  Neurological - alert and oriented, normal speech, no focal deficits  Musculoskeletal - no joint erythema or swelling, ROM is ok     ----------------------------------------------------------------------------------------    Medical Decision Making       40 MINUTES SPENT BY ME on the date of service doing chart review, history, exam, documentation & further activities per the note.      Data   ------------------------- PAST 24 HR DATA REVIEWED -----------------------------------------------    I have personally reviewed the following data over the past 24 hrs:    N/A  \   N/A   / N/A     N/A N/A N/A /  N/A   3.6 N/A N/A \     Imaging results reviewed over the past 24 hrs:   Recent Results (from the past 24 hour(s))   MR Brain w/o Contrast    Narrative    EXAM: MR BRAIN W/O CONTRAST  LOCATION: [facility]  DATE: 10/8/2023    INDICATION: AMS  COMPARISON: None.  TECHNIQUE: Routine multiplanar multisequence head MRI without intravenous contrast.    FINDINGS:  INTRACRANIAL CONTENTS: No acute or subacute infarct. No mass, acute hemorrhage, or extra-axial fluid collections. Normal brain parenchymal signal. Normal ventricles and sulci. Normal position of the cerebellar tonsils.     SELLA: No abnormality accounting for technique.    OSSEOUS STRUCTURES/SOFT TISSUES: Normal marrow signal. The major intracranial vascular flow voids are maintained.     ORBITS: No abnormality accounting for technique.     SINUSES/MASTOIDS: Mild mucosal thickening scattered about the paranasal sinuses. No middle ear or mastoid effusion.       Impression     IMPRESSION:  1.  Normal head MRI.     NM HepatOBiliary Scan    Narrative    EXAM: NM HEPATOBILIARY SCAN  LOCATION: [facility]  DATE: 10/9/2023    INDICATION: Abdominal pain. Concern for cholecystitis  COMPARISON: CT chest abdomen pelvis 09/27/2023. Abdominal ultrasound 10/07/2023.  TECHNIQUE: 6.5 mCi of Tc-99m mebrofenin, IV. Anterior planar imaging of the abdomen. 1.6 mcg of CCK analog.    FINDINGS: Normal radionuclide activity in the gallbladder, bile ducts, and small bowel. No evidence of cystic or common duct obstruction or intrinsic liver disease. Photopenia in the right hepatic lobe corresponding to the lesion on prior CT. Gallbladder   ejection fraction is 100%, which is within the normal range of 35% or greater.        Impression    IMPRESSION:     Negative for acute cholecystitis and biliary dyskinesia.     ------------------------- ENCOUNTER LABS ----------------------------------------------------------------  Recent Labs   Lab 10/09/23  0409 10/08/23  2138 10/08/23  1506 10/08/23  0720 10/07/23  2056 10/07/23  0953   WBC  --   --   --  9.7  --  7.4   HGB  --   --   --  12.0*  --  12.6*   MCV  --   --   --  83  --  81   PLT  --   --   --  301  --  298   INR  --   --   --   --   --  1.28*   NA  --   --   --  142  --  137   POTASSIUM 3.6 3.4 3.2* 2.6*   < > 2.2*   CHLORIDE  --   --   --  115*  --  105   CO2  --   --   --  15*  --  15*   BUN  --   --   --  17.2  --  20.1   CR  --   --   --  2.21*  --  2.50*   ANIONGAP  --   --   --  12  --  17*   LATRELL  --   --   --  9.1  --  9.3   GLC  --   --   --  82  --  91   ALBUMIN  --   --   --  2.9*  --  3.2*   PROTTOTAL  --   --   --  6.1*  --  6.4   BILITOTAL  --   --   --  0.4  --  0.5   ALKPHOS  --   --   --  231*  --  254*   ALT  --   --   --  32  --  43   AST  --   --   --  35  --  49*   LIPASE  --   --   --   --   --  102*    < > = values in this interval not displayed.       Most Recent 3 CBC's:  Recent Labs   Lab Test 10/08/23  4893  10/07/23  0953 09/27/23  1155   WBC 9.7 7.4 8.0   HGB 12.0* 12.6* 12.8*   MCV 83 81 84    298 225     Most Recent 3 BMP's:  Recent Labs   Lab Test 10/09/23  0409 10/08/23  2138 10/08/23  1506 10/08/23  0720 10/07/23  2056 10/07/23  0953 09/27/23  1155   NA  --   --   --  142  --  137 139   POTASSIUM 3.6 3.4 3.2* 2.6*   < > 2.2* 3.3*   CHLORIDE  --   --   --  115*  --  105 109*   CO2  --   --   --  15*  --  15* 14*   BUN  --   --   --  17.2  --  20.1 11.5   CR  --   --   --  2.21*  --  2.50* 1.09   ANIONGAP  --   --   --  12  --  17* 16*   LATRELL  --   --   --  9.1  --  9.3 9.3   GLC  --   --   --  82  --  91 178*    < > = values in this interval not displayed.     Most Recent 2 LFT's:  Recent Labs   Lab Test 10/08/23  0720 10/07/23  0953   AST 35 49*   ALT 32 43   ALKPHOS 231* 254*   BILITOTAL 0.4 0.5     Most Recent 3 INR's:  Recent Labs   Lab Test 10/07/23  0953 09/27/23  1155 09/14/22  1006   INR 1.28* 1.29* 0.98     Most Recent 3 Troponin's:  Recent Labs   Lab Test 06/04/21  0624 12/24/16  1020 12/24/16  0325   TROPI 0.035 <0.015  The 99th percentile for upper reference range is 0.045 ug/L.  Troponin values in   the range of 0.045 - 0.120 ug/L may be associated with risks of adverse   clinical events.   <0.015  The 99th percentile for upper reference range is 0.045 ug/L.  Troponin values in   the range of 0.045 - 0.120 ug/L may be associated with risks of adverse   clinical events.       Most Recent 3 BNP's:No lab results found.  Most Recent D-dimer:  Recent Labs   Lab Test 12/23/16  2130   DD 0.3     Most Recent 6 Bacteria Isolates From Any Culture (See EPIC Reports for Culture Details):  Recent Labs   Lab Test 06/04/21  1104 06/04/21  0744 05/27/21  1031 05/27/21  1015 05/27/21  0950 05/12/21  2304   CULT Moderate growth  Normal manuel    Light growth  beta hemolytic   Streptococcus anginosus  This organism is susceptible to ampicillin, penicillin, vancomycin and the cephalosporins.   If treatment is  required AND your patient is allergic to penicillin, contact the   Microbiology Lab within 5 days to request susceptibility testing.  * No growth No growth No growth No growth No growth     Most Recent Urinalysis:  Recent Labs   Lab Test 10/07/23  1328 10/28/17  1500 01/18/16  1457   COLOR Yellow   < > Yellow   APPEARANCE Clear   < > Clear   URINEGLC Negative   < > Negative   URINEBILI Negative   < > Negative   URINEKETONE Negative   < > Negative   SG 1.005   < > 1.015   UBLD Negative   < > Negative   URINEPH 7.0   < > 7.0   PROTEIN 100*   < > Negative   UROBILINOGEN  --   --  0.2   NITRITE Negative   < > Negative   LEUKEST Negative   < > Negative   RBCU 1   < >  --    WBCU 0   < >  --     < > = values in this interval not displayed.

## 2023-10-09 NOTE — PROGRESS NOTES
CLINICAL NUTRITION SERVICES  -  ASSESSMENT NOTE      Recommendations Ordered by Registered Dietitian (RD):   Ordered Gel+ and Ensure Clear while diet advances     Malnutrition:   % Weight Loss:  > 10% in 6 months (severe malnutrition)-- 15% loss  % Intake:  </= 50% for >/= 5 days (severe malnutrition)-- suspected x~7 days  Subcutaneous Fat Loss:  unable to assess, pt not available   Muscle Loss:  unable to assess, pt not available   Fluid Retention:  Does not meet criteria-- trace BLE edema     Malnutrition Diagnosis: Severe malnutrition in the context of --  Acute illness or injury  Chronic illness or disease         REASON FOR ASSESSMENT  Quan Murphy is a 73 year old male seen by Registered Dietitian for Admission Nutrition Risk Screen for positive. Recent wt loss of 14-23# w/ decreased appetite reported.        NUTRITION HISTORY  Information obtained from chart review  Unable to obtain nutrition history from pt d/t pt gone from room    PMH of HTN, HLD, Allergic rhinitis, Anxiety, Arthritis, Chest pain, Chronic sinusitis, Coronary atherosclerosis, Depressive disorder, GERD, Head injury (1954), Hiatal hernia (2015), History of blood transfusion (2004), Inguinal hernia, Kidney problem (2017), Kidney stones, Prostate cancer, Syncope, and Tonsil cancer     Per chart review, pt was meeting w/ outpt RD at [facility] from 4/2021-9/2021. Was taking protein supplements. Admitted to [facility] in 6/2021-- G-tube placed 6/7/21. Received 4 cartons of Jevity 1.5 daily. FT removed 7/27/21. Taking 5 Ensure Enlive protein shakes daily after FT removed. Continued struggles w/ xerostomia.     H&P = In this past 1 week, he has been doing poorly. Reports he has diarrhea about 2 times a day. He has some nausea but no vomiting. He has anorexia. He is not able to maintain oral intake. Reports he has lost 10 pounds in 7 days. He is feeling weak, dizzy and lightheaded and has had several falls.     Attempted to visit w/ pt this morning but he was  "absent from his room.       CURRENT NUTRITION ORDERS  Diet: Clear Liquid Diet      Current Intake/Tolerance:  Previously NPO and FLD  NPO for HIDA scan   Now advanced to clears   100% intake documented today       PER CHART REVIEW  Admitted for:   Cholelithiasis with cholecystitis   Acute kidney injury  Mild anion gap metabolic acidosis  Severe hypokalemia  Diarrhea   Acute metabolic encephalopathy     10/7: abd US =   1. Cholelithiasis with increased wall thickening compared to 09/27/2023, however no pericholecystic fluid and negative sonographic Dill's. Findings are equivocal for acute cholecystitis. Consider HIDA scan if continued clinical concern.  2. Postablation changes in the liver.    10/8: brain MRI = normal    10/9: HIDA = Negative for acute cholecystitis and biliary dyskinesia.       NUTRITION FOCUSED PHYSICAL ASSESSMENT FOR DIAGNOSING MALNUTRITION)  No: Patient not available                Observed:    Pt not available     Obtained from Chart/Interdisciplinary Team:  RN notes-- Blister to L knee, blanchable redness to toes     ANTHROPOMETRICS  Height: 5' 8\"  Weight: 181 lbs 0 oz  Body mass index is 27.52 kg/m .  Weight Status:  Overweight BMI 25-29.9  IBW: 70 kg  % IBW: 117%  Weight History:   Wt loss of 14.9 kg over past 6 months (15%)  10/07/23 82.1 kg (181 lb)   10/07/23 82.1 kg (181 lb)   08/02/23 91.6 kg (202 lb)   07/19/23 90.9 kg (200 lb 8 oz)   04/25/23 97 kg (213 lb 14.4 oz)   04/19/23 94.8 kg (209 lb)   03/09/23 96.1 kg (211 lb 14.4 oz)   01/17/23 92 kg (202 lb 14.4 oz)   11/18/22 88.5 kg (195 lb 1.6 oz)   11/01/22 92.3 kg (203 lb 6.4 oz)   10/23/22 89.7 kg (197 lb 11.2 oz)   09/15/22 88.5 kg (195 lb)   09/14/22 88.1 kg (194 lb 3.2 oz)   09/06/22 87.5 kg (192 lb 14.4 oz)   08/31/22 86.6 kg (191 lb)   08/26/22 88 kg (194 lb)   08/19/22 88 kg (194 lb)   08/17/22 87.6 kg (193 lb 3.2 oz)   07/27/22 88.9 kg (196 lb)   06/30/22 88.2 kg (194 lb 8 oz)   06/02/22 87.8 kg (193 lb 8 oz)   05/13/22 88.5 " kg (195 lb)   04/21/22 89.9 kg (198 lb 3 oz)   03/17/22 88.9 kg (196 lb)   02/16/22 91.2 kg (201 lb)     Care everywhere--  90.2 kg (198 lb 11.9 oz) 08/29/2023     97.6 kg (215 lb 2.7 oz) 05/19/2023      91.4 kg (201 lb 8 oz) 02/21/2023     95.3 kg (210 lb 1.6 oz) 01/31/2023      91.4 kg (201 lb 8 oz) 01/10/2023         LABS   CR 2.21 (H) 10/08/2023    ALKPHOS 231 (H) 10/08/2023     MEDICATIONS   citalopram  40 mg Oral Daily    DULoxetine  60 mg Oral Daily    levothyroxine  100 mcg Oral Daily      lactated ringers 1,000 mL with potassium chloride 20 mEq/L infusion 100 mL/hr at 10/09/23 0323         ASSESSED NUTRITION NEEDS PER APPROVED PRACTICE GUIDELINES:  Dosing Weight: 82.1 kg (10/7)  Estimated Energy Needs: 2463+ kcal (30+ Kcal/Kg)  Justification: repletion  Estimated Protein Needs:  grams protein (1.2-1.5 g pro/Kg)  Justification: repletion  Estimated Fluid Needs: 1 mL/kcal  Justification: maintenance      MALNUTRITION:  % Weight Loss:  > 10% in 6 months (severe malnutrition)-- 15% loss  % Intake:  </= 50% for >/= 5 days (severe malnutrition)-- suspected x~7 days  Subcutaneous Fat Loss:  unable to assess, pt not available   Muscle Loss:  unable to assess, pt not available   Fluid Retention:  Does not meet criteria-- trace BLE edema     Malnutrition Diagnosis: Severe malnutrition in the context of --  Acute illness or injury  Chronic illness or disease        NUTRITION DIAGNOSIS:  Inadequate oral intake related to poor appetite, diet restriction as evidenced by NPO x2 days now advanced to clears, wt loss of 15% over past 6 months         NUTRITION INTERVENTIONS  Recommendations / Nutrition Prescription  Ordered Gel+ and Ensure Clear while diet advances      Implementation  Medical food supplement therapy      Nutrition Goals  Diet to advance past liquids w/in 48 hours  Pt to consume >50% intakes of 3 meals/day when diet advances   Wt >82 kg        MONITORING AND EVALUATION:  Progress towards goals will be  monitored and evaluated per protocol and Practice Guidelines      Di Arora RD, LD   Pager: [phone]

## 2023-10-09 NOTE — CONSULTS
Care Management Initial Consult    General Information  Assessment completed with: VM-chart review, Spouse or significant other, wife Cheri  Type of CM/SW Visit: CM Role Introduction    Primary Care Provider verified and updated as needed: Yes   Readmission within the last 30 days:        Reason for Consult: discharge planning  Advance Care Planning:            Communication Assessment  Patient's communication style: spoken language (English or Bilingual)    Hearing Difficulty or Deaf: no   Wear Glasses or Blind: no    Cognitive  Cognitive/Neuro/Behavioral: WDL  Level of Consciousness: lethargic                   Living Environment:   People in home: spouse     Current living Arrangements: house      Able to return to prior arrangements: yes       Family/Social Support:  Care provided by: self, spouse/significant other  Provides care for: no one  Marital Status:   Wife  Cheri       Description of Support System: Supportive, Involved    Support Assessment: Adequate family and caregiver support, Adequate social supports    Current Resources:   Patient receiving home care services:       Community Resources:    Equipment currently used at home: none  Supplies currently used at home:      Employment/Financial:  Employment Status: retired        Financial Concerns:             Does the patient's insurance plan have a 3 day qualifying hospital stay waiver?  No    Lifestyle & Psychosocial Needs:  Social Determinants of Health     Food Insecurity: Not on file   Depression: Not at risk (3/9/2023)    PHQ-2     PHQ-2 Score: 2   Housing Stability: Not on file   Tobacco Use: Low Risk  (9/27/2023)    Patient History     Smoking Tobacco Use: Never     Smokeless Tobacco Use: Never     Passive Exposure: Not on file   Financial Resource Strain: Not on file   Alcohol Use: Not on file   Transportation Needs: Not on file   Physical Activity: Not on file   Interpersonal Safety: Not on file   Stress: Not on file   Social  Connections: Not on file       Functional Status:  Prior to admission patient needed assistance:              Mental Health Status:          Chemical Dependency Status:                Values/Beliefs:  Spiritual, Cultural Beliefs, Buddhism Practices, Values that affect care:                 Additional Information:  Consult for discharge planning. Patient admitted for presented to [facility] ER on 9/27 with fever and abdominal pain. Ultrasound showed acute cholecystitis with cholelithiasis. Writer reviewed chart and TCU or Home with assist recommendations at discharge.     Writer met with wife Cheri at phone and introduced self and role as patient had indicated to therapy during assessment that she is able to assist patient physically at home. Writer confirmed patient's primary doctor is Dr. Rivers. Writer noted that recommendations for discharge include home with assist or TCU. Cheri stated that she is a retired nurse and she has been taking care of patient and she is able to continue. They live 6 blocks from [facility] where they do out patient and stated that writer should ask patient but she would suspect he would be in favor of out patient. Cheri confirmed that they live in a split level home and patient's needs are all met on the top level of their home. She stated that she was called by patient who is expecting to discharge today and wants her to pick him up. She also expressed some frustration that she has not heard from a provider about the HIDA scan but was called by patient. Writer to meet with patient to introduce self and role.    Writer discussed transportation options and possible out of pocket costs of transport with patient. Cheri will transport patient at discharge.     Writer attempted to meet with patient regarding his thoughts and he was sleeping but willing to discuss. He reported that he would like to go home and feels that his wife can offer the support he needs. As Cheri  mentioned, he lives near an outpatient therapy office and if needed would prefer that vs home care. Writer sent a message to Dr. Kiran regarding contacting patient's wife to update.     SW to follow for discharge planning.    LYN Lowe

## 2023-10-09 NOTE — PROGRESS NOTES
"   10/09/23 0913   Appointment Info   Signing Clinician's Name / Credentials (PT) Laurent Lakhani DPT   Living Environment   People in Home spouse   Current Living Arrangements condominium   Home Accessibility stairs within home   Transportation Anticipated family or friend will provide   Living Environment Comments Pt lives in condo with spouse, pt states no stairs to enter, then split entry with 6 stairs up, B railing going up, all needs met on main floor besides laundry. Pt's spouse can provide physical assistance as needed per pt.   Self-Care   Usual Activity Tolerance good   Current Activity Tolerance moderate   Equipment Currently Used at Home none   Fall history within last six months yes   Number of times patient has fallen within last six months 3   Activity/Exercise/Self-Care Comment Pt reports he is IND with functional mobility, no AD use. Does not own any ADs   General Information   Onset of Illness/Injury or Date of Surgery 10/07/23   Referring Physician Jessica Lemus MD   Patient/Family Therapy Goals Statement (PT) return home.   Pertinent History of Current Problem (include personal factors and/or comorbidities that impact the POC) Pt is 74 yo male who, per chart, \"metastatic squamous cell carcinoma of tonsils, s/p chemoradiation, recent ablation of liver metastasis on 9/19/2023, CAD, hypertension, hyperlipidemia     He presented to [facility] ER on 9/27 with fever and abdominal pain.  Ultrasound showed acute cholecystitis with cholelithiasis.  He declined admission and left AMA.  Blood culture grew Enterococcus hirae sensitive to ampicillin.  He did not receive antibiotics for this.  Multiple attempts were made to contact the patient but were unsuccessful.  His abdominal pain subsequently resolved.     He presented to [facility] ER again on 10/7 with 1 week of diarrhea, nausea, anorexia, poor oral intake 10 pound weight loss in 1 week generalized weakness, dizziness and recurrent falls.\" "   Existing Precautions/Restrictions fall   Cognition   Affect/Mental Status (Cognition) WFL   Orientation Status (Cognition) oriented x 4   Pain Assessment   Patient Currently in Pain No   Integumentary/Edema   Integumentary/Edema no deficits were identifed   Posture    Posture Forward head position   Range of Motion (ROM)   Range of Motion ROM is WFL   Strength (Manual Muscle Testing)   Strength (Manual Muscle Testing) Able to perform R SLR;Able to perform L SLR;Deficits observed during functional mobility   Bed Mobility   Bed Mobility supine-sit   Comment, (Bed Mobility) SBA, from HOB at 30 degrees.   Transfers   Transfers sit-stand transfer   Comment, (Transfers) FWW and SBA, from elevated HOB.   Gait/Stairs (Locomotion)   Comment, (Gait/Stairs) Pt ambulated 10' with FWW and CGA, step through pattern, decreassed speed, mild kyphotic/forward flexed posture with walker use.   Balance   Balance Comments mild unsteadiness noted when standing/moving w/o AD.   Sensory Examination   Sensory Perception patient reports no sensory changes   Clinical Impression   Criteria for Skilled Therapeutic Intervention Yes, treatment indicated   PT Diagnosis (PT) impaired functional mobility   Influenced by the following impairments decreased strength and activity tolerance, impaired balance.   Functional limitations due to impairments difficulty with bed mobility, transfers, and ambulation   Clinical Presentation (PT Evaluation Complexity) Stable/Uncomplicated   Clinical Presentation Rationale clinical judgement   Clinical Decision Making (Complexity) low complexity   Planned Therapy Interventions (PT) balance training;bed mobility training;gait training;home exercise program;neuromuscular re-education;stair training;ROM (range of motion);strengthening;stretching;transfer training;progressive activity/exercise   Anticipated Equipment Needs at Discharge (PT) walker, rolling   Risk & Benefits of therapy have been explained  evaluation/treatment results reviewed;care plan/treatment goals reviewed;risks/benefits reviewed;current/potential barriers reviewed;participants voiced agreement with care plan;participants included;patient   PT Total Evaluation Time   PT Eval, Low Complexity Minutes (02579) 10   Physical Therapy Goals   PT Frequency Daily   PT Predicted Duration/Target Date for Goal Attainment 10/16/23   PT Goals Bed Mobility;Transfers;Gait;Stairs   PT: Bed Mobility Independent;Supine to/from sit   PT: Transfers Modified independent;Sit to/from stand;Assistive device   PT: Gait Modified independent;Rolling walker;150 feet   PT: Stairs Supervision/stand-by assist;6 stairs;Rail on both sides   PT Discharge Planning   PT Plan assess stairs (6 stairs B railing), progress gait distance and dynamic balance. Encourage increased mobility frequency as able.   PT Discharge Recommendation (DC Rec) home with assist;home with outpatient physical therapy   PT Rationale for DC Rec Pt at baseline is IND with functional mobility, no AD use. Lives in [facility] level, with spouse. Pt is currently below baseline, limited by decreased strength and activity tolerance. Pt demonstrates gait with FWW and SBA on this date, slow moving but steady with walker. Anticipate pt will be safe to d/c home with FWW and assistance from spouse. OP PT therapy would benefit for continued strength and balance activities.   PT Brief overview of current status FWW and SBA for gait.   Total Session Time   Total Session Time (sum of timed and untimed services) 10

## 2023-10-09 NOTE — PLAN OF CARE
Goal Outcome Evaluation:    10/09: [id number]  Orientation: A&Ox4, very forgetful  Activity: SBA, using IV pole and/or walker.  Diet/BS Checks: Tolerating clears.   Tele: N/A  IV Access/Drains: Right ac PIV infusing NS at 100/hr.   Pain Management:Denies pain  Abnormal VS/Results: K+ & Mg WNL, redraw am.  Bowel/Bladder: Continent B/B. X1 BM and voiding adequately.   Skin/Wounds: Blister to L knee, blanchable redness to toes  Consults: Gen surgery, PT, OT, SW  D/C Disposition: Discharge plan 10/10 back home after IV ABX.   Other Info: HIDA scan negative. Wife updated

## 2023-10-09 NOTE — PLAN OF CARE
Orientation: A&Ox4, very forgetful  Activity: Ax1, using IV pole  Diet/BS Checks: NPO status initiated at midnight  Tele: N/A  IV Access/Drains: New R PIV placed,  infusing LR+20mEq/L KCL at 100ml/hr  Pain Management:C/o abdominal discomfort, pt refused interventions.  Abnormal VS/Results: VSS on RA.  K+ replaced x1 this shift, recheck= 3.6   Bowel/Bladder: Continent B/B, pt had 2 loose stools this shift  Skin/Wounds: Blister to L knee, blanchable redness to toes  Consults: Gen surgery, PT, OT, SW  D/C Disposition: Discharge plan pending  Other Info:   C/o N/V, PRN zofran given x1. MRI completed, results unremarkable. Plan for HIDA scan today

## 2023-10-09 NOTE — PROGRESS NOTES
MD Notification    Notified Person: MD    Notified Person Name: YEVGENIY Kiran MD    Notification Date/Time: 10/09 at 1332    Notification Interaction: vocera    Purpose of Notification: Can pt eat?     Orders Received: awaiting     Comments: ADAT and pt will need abx

## 2023-10-09 NOTE — PLAN OF CARE
Goal Outcome Evaluation:    Orientation: A&Ox4, forgetful/confused at times  Activity: A1,with walker/gait belt  Diet/BS Checks: Full liquid diet, NPO at midnight  Tele: N/A  IV Access/Drains: L PIV infusing LR+20mEq/L KCL at 100ml/hr  Pain Management: c/o 6/10 pain to abdomen and head, PRN dilaudid x1 effective  Abnormal VS/Results: VSS on RA.  K+ replaced x2, recheck at 2200.   Bowel/Bladder: Continent, 4 loose stools today, cdiff negative   Skin/Wounds: Blister to L knee, blanchable redness to toes  Consults: Gen surgery, PT, OT, SW  D/C Disposition: Discharge plan pending  Other Info: Plan for MRI tonight and HIDA scan tomorrow (no time yet, but needs to be NPO and no narcotics for 4 hours prior)

## 2023-10-09 NOTE — PROGRESS NOTES
10/08/23 1500   Appointment Info   Signing Clinician's Name / Credentials (OT) Chela Snow,OTR/L   Living Environment   People in Home spouse   Current Living Arrangements house   Home Accessibility stairs to enter home;stairs within home   Transportation Anticipated family or friend will provide   Living Environment Comments Pt. had some difficulty answering home environemnt questions;per chart, pt. lives in a split level home w/ spouse(who is a retired RN);tub/shower combo. w/ grab bars;pt. reports he has a shower chair, but does not use;?standard toilet--need to verify info. w/ spouse   Self-Care   Usual Activity Tolerance moderate   Current Activity Tolerance fair   Activity/Exercise/Self-Care Comment Pt. reports he is indep. w/ ADL's at home;spouse does the household tasks, cleaning, laundry and assits w/ med. mgmt.   General Information   Onset of Illness/Injury or Date of Surgery 10/07/23   Referring Physician Jessica Lemus MD   Additional Occupational Profile Info/Pertinent History of Current Problem Per chart H &b P:Quan Murphy is a 73 year old male with metastatic squamous cell carcinoma of tonsils, s/p chemoradiation, recent ablation of liver metastasis on 9/19/2023, CAD, hypertension, hyperlipidemia     He presented to [facility] ER on 9/27 with fever and abdominal pain.  Ultrasound showed acute cholecystitis with cholelithiasis.  He declined admission and left AMA.  Blood culture grew Enterococcus hirae sensitive to ampicillin.  He did not receive antibiotics for this.  Multiple attempts were made to contact the patient but were unsuccessful.  His abdominal pain subsequently resolved.     He presented to [facility] ER again on 10/7 with 1 week of diarrhea, nausea, anorexia, poor oral intake 10 pound weight loss in 1 week generalized weakness, dizziness and recurrent falls.       Work-up showed severe hypokalemia with potassium of 2.2 acute kidney injury with creatinine of 2.5.  There was  "concern that his symptoms are likely related to acute cholecystitis with cholelithiasis.  He was felt to be a high risk patient and was thus transferred to [facility] for general surgery evaluation.  Plan is for general surgery to evaluate him and then decide on next course of action-conservative management versus surgery versus cholecystostomy tube placement by IR.   Existing Precautions/Restrictions fall;NPO   General Observations and Info Pt. demo. mild onfusion, impulsive at times;A & O, cooperative   Cognitive Status Examination   Orientation Status person;place;time   Affect/Mental Status (Cognitive) confused   Follows Commands 75-90% accuracy;follows one-step commands   Safety Deficit minimal deficit  (difficulty anserwing safety questions;decreased self/safety awareness)   Memory Deficit   (STM recall 1/3 words after delay)   Cognitive Status Comments will plan to compelte SLUMS 10/9   Visual Perception   Impact of Vision Impairment on Function (Vision) pt. reports he wears glasses;when asked about his vision, pt.reported\"It needs work.\"   Sensory   Sensory Comments no numbness/tingling reported   Pain Assessment   Patient Currently in Pain No   Posture   Posture protracted shoulders;forward head position   Range of Motion Comprehensive   Comment, General Range of Motion BUE WFL   Strength Comprehensive (MMT)   Comment, General Manual Muscle Testing (MMT) Assessment BUE WFL;gen.weakness noted   Coordination   Upper Extremity Coordination No deficits were identified   Bed Mobility   Comment (Bed Mobility) SBA   Transfers   Transfer Comments SBA-CGA   Balance   Balance Comments impaired, slightly unsteady;overall CGA for madeleine mobility, IV pole support   Upper Body Dressing Assessment/Training   [place] Level (Upper Body Dressing) moderate assist (50% patient effort)   Lower Body Dressing Assessment/Training   [place] Level (Lower Body Dressing)   (SBA seated dressing task)   Grooming " Assessment/Training   [place] Level (Grooming)   (SBA-CGA)   Toileting   Assistive Devices (Toileting)   (SBA-CGA)   Clinical Impression   Criteria for Skilled Therapeutic Interventions Met (OT) Yes, treatment indicated   OT Diagnosis Decline in ADL performnace   OT Problem List-Impairments impacting ADL problems related to;activity tolerance impaired;balance;cognition;mobility;strength   Assessment of Occupational Performance 3-5 Performance Deficits   Identified Performance Deficits dressing, toileting, bathing, standing ADL's, mobility   Planned Therapy Interventions (OT) ADL retraining;cognition;strengthening;home program guidelines;progressive activity/exercise   Clinical Decision Making Complexity (OT) low complexity   Anticipated Equipment Needs Upon Discharge (OT)   (shower chair if does not have one)   Risk & Benefits of therapy have been explained evaluation/treatment results reviewed;care plan/treatment goals reviewed;risks/benefits reviewed;current/potential barriers reviewed;participants voiced agreement with care plan;participants included;patient   OT Total Evaluation Time   OT Eval, Low Complexity Minutes (00223) 8   OT Goals   Therapy Frequency (OT) 5 times/wk   OT Predicted Duration/Target Date for Goal Attainment 10/13/23   OT Goals Hygiene/Grooming;Lower Body Dressing;Toilet Transfer/Toileting;Cognition   OT: Hygiene/Grooming modified independent;independent;while standing   OT: Lower Body Dressing Independent;Modified independent;using adaptive equipment;including set-up/clothing retrieval  (full dressing)   OT: Toilet Transfer/Toileting Independent;Modified independent;cleaning and garment management;toilet transfer;using adaptive equipment   OT: Cognitive Patient/caregiver will verbalize understanding of cognitive assessment results/recommendations as needed for safe discharge planning   Self-Care/Home Management   Self-Care/Home Mgmt/ADL, Compensatory, Meal Prep Minutes (54308) 20    Symptoms Noted During/After Treatment (Meal Preparation/Planning Training) fatigue   Treatment Detail/Skilled Intervention Pt. lying in bed upon arrival, slepping, easily awakened;pt. able to come supine-sit SBA,;sit-stand + ambulation in room to/from bathroom w/ SBA-CGA, IV pole support;pt. is unsfafe/impulsive at times, w/ some confusion noted throughout session;Pt. completed toileting, transfer SBA-CGA;pt. needed max. A to doff/marysol new gown + robe;pt. sat in chair w/ SBA-CGA;pt. NPO but able to have some water per nursing/surgeon;pt. holding cup in R hand, dropped water on lap;mod. A to doff wet robe/top layer;pt. up inchair w/ chair alarm activated upon departure;nursing updated. Will plan to complete SLUMS 10/9.   Therapeutic Activities   Therapeutic Activity Minutes (47847) 8   Symptoms noted during/after treatment fatigue   Treatment Detail/Skilled Intervention Pt. completing transfers w/ SBA-CGA, declines AD;pt. used IV pole for support for hallway/room mobility mobility;In order to progress activity larry., endurance for ADL's, pt.ambulated approx.100 feet total in hallway, CGA, seated rest break after 50 feet/half-way;pt. unsteady at times, lacks safety awareness;pt. attempting to use IV pole for push off to stand versus chair, CGA-min. A sit-stand;pt. overall SBA-CGA for room mobility, impusively bending and reaching for floor to demo. bathroom set-up, unsteady, min. A required for dynamic balance;left WW in room for safety, although pt. declined use.   OT Discharge Planning   OT Plan SLUMS, g/h standing sinkside   OT Discharge Recommendation (DC Rec) home with assist;home with home care occupational therapy;Transitional Care Facility   OT Rationale for DC Rec Currently, pt. below basline, needing A X 1 for safe mobility + ADL  completion;pt. limited by weakness, impaired balance, impaired cognition/safety;At this time, rec. TCU to maximize safety/indep. prior to discharge back home;pending progress.,  pt. may be able to discharge directly home w/ assist for I/ADl's as indicated, Home OT. Spouse is a retired RN.   OT Brief overview of current status CGA room/hallway mobility using IV pole support;SBA-CGA toileting, difficulty answering safety questions, memory recall 1/3;will plan for SLUMS tomorrow   Total Session Time   Timed Code Treatment Minutes 28   Total Session Time (sum of timed and untimed services) 36

## 2023-10-10 NOTE — PLAN OF CARE
Goal Outcome Evaluation:    Orientation: A&Ox4  Activity: SBA with walker  Diet/BS Checks: Tolerating clears.   Tele: N/A  IV Access/Drains: R PIV infusing NS at 100/hr.   Pain Management:Abdominal discomfort PRN Oxy x2 and PRN Zofran x2 given.   Abnormal VS/Results: VSS on RA   Bowel/Bladder: Continent B/B. X3 BM during shift.   Skin/Wounds: Blister to L knee, blanchable redness to toes  Consults: Gen surgery, PT, OT, SW  D/C Disposition: Discharge plan 10/10 back home after IV ABX.   Other Info: HIDA scan negative.

## 2023-10-10 NOTE — CONSULTS
Care Management Discharge Note    Discharge Date: 10/10/2023       Discharge Disposition:  home with spouse    Discharge Services:  n/a    Discharge DME:  none    Discharge Transportation: family or friend will provide    Private pay costs discussed: Not applicable    Does the patient's insurance plan have a 3 day qualifying hospital stay waiver?  No    PAS Confirmation Code:  n/a  Patient/family educated on Medicare website which has current facility and service quality ratings:  no    Education Provided on the Discharge Plan: yes   Persons Notified of Discharge Plans: Patient, Spouse at the bedside,Bedside RN  Patient/Family in Agreement with the Plan: yes    Handoff Referral Completed: Yes    Additional Information:  Writer met with the patient and spouse at the bedside. Patient is cleared for discharge to home with PCP follow up as scheduled in the AVS.  Patient and Spouse identified no further needs for discharge at this time and are hopeful to leave soon.Writer updated bedside that AVS is completed and no needs identified.      Zulma Conroy RN, BSN, ACM   Care Transitions Specialist  [facility]  Care Transitions Specialist  [address] [id number]  [email or web address]  Office: [phone] Fax: [phone]  [facility]

## 2023-10-10 NOTE — PLAN OF CARE
Physical Therapy Discharge Summary    Reason for therapy discharge:    Discharged to home.    Progress towards therapy goal(s). See goals on Care Plan in [facility] electronic health record for goal details.  Goals partially met.  Barriers to achieving goals:   discharge from facility.    Therapy recommendation(s):    Continued therapy is recommended.  Rationale/Recommendations:   .     PT Discharge Planning:    PT Plan: Progress gait distance without AD, balance assessment  PT Discharge Recommendation (DC Rec): home with assist, home with outpatient physical therapy  PT Rationale for DC Rec: Pt at baseline is IND with functional mobility, no AD use. Lives in [facility] level, with spouse. Pt is currently below baseline, limited by decreased strength and activity tolerance. Pt demonstrates gait without AD and CGA on this date, but slow moving. Anticipate pt will be safe to d/c home with assistance from spouse. OP PT therapy would benefit for continued strength and balance activities.  PT Brief overview of current status: No AD and SBA-CGA for gait    Recommendation above provided by last treating therapist.

## 2023-10-10 NOTE — DISCHARGE SUMMARY
"[facility]  Hospitalist Discharge Summary      Date of Admission:  10/7/2023  Date of Discharge:  10/10/2023  Discharging Provider: Ahmet Caraballo MD  Discharge Service: Hospitalist Service    Discharge Diagnoses   Symptomatic cholelithiasis   Acute kidney injury   Hypokalemia   Acute metabolic encephalopathy  Recent enterococcus hirae bacteremia    Clinically Significant Risk Factors     # Overweight: Estimated body mass index is 27.83 kg/m  as calculated from the following:    Height as of this encounter: 1.727 m (5' 8\").    Weight as of this encounter: 83 kg (183 lb).    # Severe Malnutrition: based on nutrition assessment      Follow-ups Needed After Discharge   Follow-up Appointments     Follow-up and recommended labs and tests       Follow up with primary care provider, Monico Rivers, within 7 days   for hospital follow- up.  The following labs/tests are recommended:   complete metabolic profile and complete blood count.            Unresulted Labs Ordered in the Past 30 Days of this Admission       No orders found from 9/7/2023 to 10/8/2023.        These results will be followed up by primary care provider     Discharge Disposition   Discharged to home  Condition at discharge: Stable    Hospital Course   Quan Murphy is a 73 year old male with metastatic squamous cell carcinoma of tonsils, s/p chemoradiation, recent ablation of liver metastasis on 9/19/2023, CAD, hypertension, hyperlipidemia  He presented to [facility] ER on 9/27 with fever and abdominal pain.  Ultrasound showed acute cholecystitis with cholelithiasis.  He declined admission and left AMA.  Blood culture grew Enterococcus hirae sensitive to ampicillin.  He did not receive antibiotics for this.  Multiple attempts were made to contact the patient but were unsuccessful.  His abdominal pain subsequently resolved.  He presented to [facility] ER again on 10/7 with 1 week of diarrhea, nausea, anorexia, poor " oral intake 10 pound weight loss in 1 week generalized weakness, dizziness and recurrent falls.    Work-up showed severe hypokalemia with potassium of 2.2 acute kidney injury with creatinine of 2.5.  There was concern that his symptoms are likely related to acute cholecystitis with cholelithiasis.  He was felt to be a high risk patient and was thus transferred to [facility] for general surgery evaluation.  The plan was for general surgery to evaluate him and then decide on next course of action-conservative management versus surgery versus cholecystostomy tube placement by IR.    Cholelithiasis  Ultrasound abdomen 10/7 showed cholelithiasis with increased wall thickening compared to 9/27.  No pericholecystic fluid and negative sonographic Dill's sign.  Findings equivocal for acute cholecystitis.  HIDA scan recommended.  Post ablation changes in liver.  Pro-calcitonin 5.93.  Lactate normal at 1.3.  No leukocytosis.  INR 1.28.  Non-tender in right upper quadrant.  Mild elevation in LFTs-alkaline phosphatase 254, ALT 43, AST 49, total bilirubin 0.5  Consulted general surgery. HIDA scan was Negative for acute cholecystitis and biliary dyskinesia. No surgery recommended. Symptomatic treatment.    Enterococcal bacteremia prior to admission   1/2 bottles positive on 9/27 at OSH- patient refused treatment and left.  Blood cultures this admission negative.   Urinalysis negative for infection.  Transthoracic echocardiogram- no evidence of endocarditis   Tmax during admission 98.3F  Pro-calcitonin 5.93  C-reactive protein 30   WBC <10K  He was treated with Unasyn and discharged on Augmentin.  Follow up with primary care provider     Acute kidney injury  Mild anion gap metabolic acidosis  Creatinine   Date Value Ref Range Status   10/09/2023 2.01 (H) 0.67 - 1.17 mg/dL Final   07/02/2021 1.14 0.66 - 1.25 mg/dL Final   Creatinine of 2.5, up from baseline of 1.  This is likely prerenal   Creatinine improving.    Follow up with primary care provider     Severe hypokalemia, K 2.2   Potassium   Date Value Ref Range Status   10/09/2023 3.6 3.4 - 5.3 mmol/L Final   10/09/2023 3.7 3.4 - 5.3 mmol/L Final   12/15/2022 4.3 3.4 - 5.3 mmol/L Final   07/02/2021 4.1 3.4 - 5.3 mmol/L Final   Due to poor oral intake, nausea and diarrhea   Replaced per protocol.  Follow up with primary care provider     Diarrhea  Reports after 2 episodes of loose stools per day.  Reports was treated with antibiotics recently but does not remember for what reason.  C. Difficile negative and diarrhea has resolved.    Acute metabolic encephalopathy  Somewhat confused on admission, had difficulty answering questions,falling asleep.This was likely secondary to acute illness and dehydration.  MRI Brain negative for acute pathology.  Resolved with supportive care.    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    CAD, s/p stenting of LAD in 2016   Hypertension  Dyslipidemia  Continue metoprolol and rosuvastatin    Generalized weakness  Recurrent falls  Secondary to hypovolemia and acute illness.  Consulted physcial therapy and occupational therapy- ok to discharge home    Diet:  NPO   DVT Prophylaxis: Pneumatic Compression Devices  Stone Catheter: Not present  Lines: None     Cardiac Monitoring: None  Code Status:  Full code    Consultations This Hospital Stay   SURGERY GENERAL IP CONSULT  CARE MANAGEMENT / SOCIAL WORK IP CONSULT  PHYSICAL THERAPY ADULT IP CONSULT  OCCUPATIONAL THERAPY ADULT IP CONSULT    Code Status   Prior    Time Spent on this Encounter   I, Ahmet Caraballo MD, personally saw the patient today and spent greater than 30 minutes discharging this patient.       Ahmet Caraballo MD  [place] [address] [facility]  [address]  [facility]  "[id number]  Phone: [phone]  ______________________________________________________________________    Physical Exam   Vital Signs:                    Weight: 183 lbs 0 oz  /70 (BP Location: Left arm)   Pulse 68   Temp 98.1  F (36.7  C) (Oral)   Resp 18   Ht 1.727 m (5' 8\")   Wt 83 kg (183 lb)   SpO2 96%   BMI 27.83 kg/m    Constitutional: awake, alert, cooperative, no apparent distress  Eyes: Lids and lashes normal, pupils equal, round, sclera clear, conjunctiva normal  ENT: Normocephalic, without obvious abnormality, atraumatic, oral pharynx with moist mucous membranes, tonsils without erythema or exudates  Respiratory: No increased work of breathing, good air exchange, clear to auscultation bilaterally, no crackles or wheezing  Cardiovascular: regular rate and rhythm, normal S1 and S2, no S3 or S4, and no murmur noted  GI: normal bowel sounds, soft, non-distended, non-tender, no masses palpated  Skin: no rashes and no jaundice  Musculoskeletal: There is no redness, warmth, or swelling of the joints.  Full range of motion noted.  Motor strength is 5 out of 5 all extremities bilaterally.  Tone is normal.  Neurologic: Awake, alert, oriented to name, place and time.  Cranial nerves II-XII are grossly intact.  Motor is 5 out of 5 bilaterally.  Neuropsychiatric: General: normal, calm and normal eye contact=vs  Constitutional: awake, alert, cooperative, no apparent distress  Neurologic: Awake, alert  Neuropsychiatric: General: normal, calm and normal eye contact         Primary Care Physician   Monico Rivers    Discharge Orders      Primary Care - Care Coordination Referral      Reason for your hospital stay    Treatment of a blood infection     Follow-up and recommended labs and tests     Follow up with primary care provider, Monico Rivers, within 7 days for hospital follow- up.  The following labs/tests are recommended: complete metabolic profile and complete blood count.     Activity    " Your activity upon discharge: activity as tolerated     Diet    Follow this diet upon discharge: regular       Significant Results and Procedures   Most Recent 3 CBC's:  Recent Labs   Lab Test 10/08/23  0720 10/07/23  0953 09/27/23  1155   WBC 9.7 7.4 8.0   HGB 12.0* 12.6* 12.8*   MCV 83 81 84    298 225     Most Recent 3 BMP's:  Recent Labs   Lab Test 10/09/23  0409 10/08/23  2138 10/08/23  1506 10/08/23  0720 10/07/23  2056 10/07/23  0953     --   --  142  --  137   POTASSIUM 3.7  3.6 3.4 3.2* 2.6*   < > 2.2*   CHLORIDE 116*  --   --  115*  --  105   CO2 12*  --   --  15*  --  15*   BUN 16.7  --   --  17.2  --  20.1   CR 2.01*  --   --  2.21*  --  2.50*   ANIONGAP 14  --   --  12  --  17*   LATRELL 9.1  --   --  9.1  --  9.3   GLC 79  --   --  82  --  91    < > = values in this interval not displayed.   ,   Results for orders placed or performed during the hospital encounter of 10/07/23   NM HepatOBiliary Scan    Narrative    EXAM: NM HEPATOBILIARY SCAN  LOCATION: [facility]  DATE: 10/9/2023    INDICATION: Abdominal pain. Concern for cholecystitis  COMPARISON: CT chest abdomen pelvis 09/27/2023. Abdominal ultrasound 10/07/2023.  TECHNIQUE: 6.5 mCi of Tc-99m mebrofenin, IV. Anterior planar imaging of the abdomen. 1.6 mcg of CCK analog.    FINDINGS: Normal radionuclide activity in the gallbladder, bile ducts, and small bowel. No evidence of cystic or common duct obstruction or intrinsic liver disease. Photopenia in the right hepatic lobe corresponding to the lesion on prior CT. Gallbladder   ejection fraction is 100%, which is within the normal range of 35% or greater.        Impression    IMPRESSION:     Negative for acute cholecystitis and biliary dyskinesia.   MR Brain w/o Contrast    Narrative    EXAM: MR BRAIN W/O CONTRAST  LOCATION: [facility]  DATE: 10/8/2023    INDICATION: AMS  COMPARISON: None.  TECHNIQUE: Routine multiplanar multisequence head  MRI without intravenous contrast.    FINDINGS:  INTRACRANIAL CONTENTS: No acute or subacute infarct. No mass, acute hemorrhage, or extra-axial fluid collections. Normal brain parenchymal signal. Normal ventricles and sulci. Normal position of the cerebellar tonsils.     SELLA: No abnormality accounting for technique.    OSSEOUS STRUCTURES/SOFT TISSUES: Normal marrow signal. The major intracranial vascular flow voids are maintained.     ORBITS: No abnormality accounting for technique.     SINUSES/MASTOIDS: Mild mucosal thickening scattered about the paranasal sinuses. No middle ear or mastoid effusion.       Impression    IMPRESSION:  1.  Normal head MRI.     Echocardiogram Complete     Value    LVEF  60%    [facility]    [id number]  [id number]  [id number]  [id number]^NORBERTO^QASIM^BIMAL     [facility]  Echocardiography Laboratory  [address] [place] [address]     Name: CHUCHO MONTOYA  MRN: [id number]  : 1950  Study Date: 10/10/2023 12:41 PM  Age: 73 yrs  Gender: Male  Patient Location: [facility]  Reason For Study: CAD, Metastatic squamous cell carcinoma of tonsils  Ordering Physician: QASIM THORNTON  Referring Physician: Monico Rivers  Performed By: Ash Shah RDCS     BSA: 2.0 m2  Height: 68 in  Weight: 183 lb  HR: 53  BP: 130/81 mmHg  ______________________________________________________________________________  Procedure  Complete Portable Echo Adult. Myocardial Strain Imaging performed.  ______________________________________________________________________________  Interpretation Summary     1. The left ventricle is normal in structure, function and size. The visual  ejection fraction is estimated at 60%. Global peak LV longitudinal strain is  averaged at -19%. This is within reported normal limits (normal <-18%).  2. The right ventricle is normal in structure, function and size.  3. No valve disease.     Echo 10/2022 showed EF 55%, strain  -19%.  ______________________________________________________________________________  Left Ventricle  The left ventricle is normal in structure, function and size. There is normal  left ventricular wall thickness. The visual ejection fraction is estimated at  60%. Global peak LV longitudinal strain is averaged at -19%. This is within  reported normal limits (normal <-18%). Left ventricular diastolic function is  normal. Normal left ventricular wall motion.     Right Ventricle  The right ventricle is normal in structure, function and size.     Atria  Normal left atrial size. Right atrial size is normal. There is no atrial shunt  seen.     Mitral Valve  There is mild (1+) mitral regurgitation.     Tricuspid Valve  There is mild (1+) tricuspid regurgitation. The right ventricular systolic  pressure is approximated at 24.9 mmHg plus the right atrial pressure.     Aortic Valve  There is mild (1+) aortic regurgitation.     Pulmonic Valve  The pulmonic valve is normal in structure and function.     Vessels  Normal ascending, transverse (arch), and descending aorta. The inferior vena  cava was normal in size with preserved respiratory variability.     Pericardium  There is no pericardial effusion.     Rhythm  Sinus rhythm was noted.  ______________________________________________________________________________  MMode/2D Measurements & Calculations  IVSd: 1.2 cm     LVIDd: 4.4 cm  LVIDs: 2.9 cm  LVPWd: 1.1 cm  FS: 32.3 %  LV mass(C)d: 176.3 grams  LV mass(C)dI: 89.6 grams/m2  Ao root diam: 3.6 cm  LA dimension: 3.7 cm  asc Aorta Diam: 3.8 cm  LA/Ao: 1.0  Ao root diam index Ht(cm/m): 2.1  Ao root diam index BSA (cm/m2): 1.8  Asc Ao diam index BSA (cm/m2): 1.9  Asc Ao diam index Ht(cm/m): 2.2  LA Volume (BP): 49.9 ml     LA Volume Index (BP): 25.3 ml/m2  RV Base: 4.2 cm  RWT: 0.50  TAPSE: 2.1 cm     Doppler Measurements & Calculations  MV E max lenore: 69.4 cm/sec  MV A max lenore: 91.7 cm/sec  MV E/A: 0.76  MV dec slope: 428.5  cm/sec2  MV dec time: 0.16 sec  AI P1/2t: 1060 msec  PA acc time: 0.10 sec  TR max foster: 249.3 cm/sec  TR max P.9 mmHg  E/E' av.0  Lateral E/e': 6.2  Medial E/e': 7.8  RV S Foster: 11.7 cm/sec     ______________________________________________________________________________  Report approved by: Raymond Sinclair 10/10/2023 01:43 PM           *Note: Due to a large number of results and/or encounters for the requested time period, some results have not been displayed. A complete set of results can be found in Results Review.       Discharge Medications   Discharge Medication List as of 10/10/2023  4:18 PM        START taking these medications    Details   amoxicillin (AMOXIL) 875 MG tablet Take 1 tablet (875 mg) by mouth 2 times daily for 10 days, Disp-20 tablet, R-0, E-Prescribe           CONTINUE these medications which have CHANGED    Details   omeprazole (PRILOSEC) 40 MG DR capsule Take 1 capsule (40 mg) by mouth daily as needed (heartburn), No Print Out      pregabalin (LYRICA) 150 MG capsule Take 1 capsule (150 mg) by mouth daily, Disp-120 capsule, R-1, No Print Out           CONTINUE these medications which have NOT CHANGED    Details   cetirizine (ZYRTEC) 10 MG tablet Take 1 tablet (10 mg) by mouth daily, Disp-30 tablet, R-1, E-Prescribe      citalopram (CELEXA) 40 MG tablet TAKE 1 TABLET BY MOUTH ONCE DAILY, Disp-90 tablet, R-0, E-Prescribe      DULoxetine (CYMBALTA) 60 MG capsule Take 1 capsule (60 mg) by mouth daily, Disp-90 capsule, R-3, E-Prescribe      famotidine (PEPCID) 20 MG tablet Take 20 mg by mouth 2 times daily as needed (heartburn), Historical      levothyroxine (SYNTHROID/LEVOTHROID) 100 MCG tablet Take 100 mcg by mouth daily, Historical      metoprolol succinate ER (TOPROL XL) 50 MG 24 hr tablet Take 0.5 tablets (25 mg) by mouth daily, Disp-90 tablet, R-3, E-Prescribe      naloxone (NARCAN) 4 MG/0.1ML nasal spray Spray 1 spray (4 mg) into one nostril alternating nostrils as needed  for opioid reversal every 2-3 minutes until assistance arrives, Disp-0.2 mL, R-0, E-Prescribe      nitroGLYcerin (NITROSTAT) 0.4 MG sublingual tablet For chest pain place 1 tablet under the tongue every 5 minutes for 3 doses. If symptoms persist 5 minutes after 1st dose call 911., Disp-25 tablet, R-0, E-Prescribe      oxyCODONE (ROXICODONE) 5 MG tablet TAKE 1-2 TABLETS (5-10 MG) BY MOUTH EVERY 6 HOURS AS NEEDED FOR SEVERE PAIN (LIMIT TO 6 TABLETS PER DAY), Disp-120 tablet, R-0, E-Prescribe      rosuvastatin (CRESTOR) 40 MG tablet Take 1 tablet (40 mg) by mouth daily, Disp-90 tablet, R-3, E-Prescribe      zolpidem (AMBIEN) 5 MG tablet TAKE 1 TABLET (5 MG) BY MOUTH NIGHTLY AS NEEDED FOR SLEEP, Disp-30 tablet, R-0, E-Prescribe      ondansetron (ZOFRAN ODT) 4 MG ODT tab Take 1 tablet (4 mg) by mouth every 8 hours as needed for nausea, Disp-10 tablet, R-0, E-Prescribe           Allergies   Allergies   Allergen Reactions    Animal Dander     Azithromycin Nausea and Vomiting    Dust Mites     Pollen Extract     Smoke.

## 2023-10-10 NOTE — PLAN OF CARE
Occupational Therapy Discharge Summary    Reason for therapy discharge:    Discharged to home.    Progress towards therapy goal(s). See goals on Care Plan in [facility] electronic health record for goal details.  Goals partially met.  Barriers to achieving goals:   discharge from facility.    Therapy recommendation(s):    Currently, pt. below basline, needing A X 1 for safe mobility + ADL completion;pt. limited by weakness, impaired balance, impaired cognition/safety;At this time, rec. TCU to maximize safety/indep. prior to discharge back home;pending progress., pt. may be able to discharge directly home w/ assist for I/ADl's as indicated, Home OT. Spouse is a retired RN. 19/30 on [employer]

## 2023-10-11 NOTE — PROGRESS NOTES
Clinic Care Coordination Contact  [facility]/Voicemail       Clinical Data: Care Coordinator Outreach  Outreach attempted x 1.  Left message on patient's voicemail with call back information and requested return call.  Plan:  Care Coordinator will try to reach patient again in 1-2 business days.    Rossana Moore RN Care Coordination   [facility] Center[facility]  Email: [email or web address]  Phone: [phone]

## 2023-10-12 NOTE — PROGRESS NOTES
Clinic Care Coordination Contact  [facility]: Post-Discharge Note  SITUATION                                                      Admission:    Admission Date: 10/07/23   Reason for Admission: Enterococcus hirae bacteremia  Discharge:   Discharge Date: 10/10/23  Discharge Diagnosis: Enterococcus hirae bacteremia    BACKGROUND                                                      Per hospital discharge summary and inpatient provider notes:    Hospital Course  Quan Murphy is a 73 year old male with metastatic squamous cell carcinoma of tonsils, s/p chemoradiation, recent ablation of liver metastasis on 9/19/2023, CAD, hypertension, hyperlipidemia     He presented to [facility] ER on 9/27 with fever and abdominal pain.  Ultrasound showed acute cholecystitis with cholelithiasis.  He declined admission and left AMA.  Blood culture grew Enterococcus hirae sensitive to ampicillin.  He did not receive antibiotics for this.  Multiple attempts were made to contact the patient but were unsuccessful.  His abdominal pain subsequently resolved.     He presented to [facility] ER again on 10/7 with 1 week of diarrhea, nausea, anorexia, poor oral intake 10 pound weight loss in 1 week generalized weakness, dizziness and recurrent falls.       Work-up showed severe hypokalemia with potassium of 2.2 acute kidney injury with creatinine of 2.5.  There was concern that his symptoms are likely related to acute cholecystitis with cholelithiasis.  He was felt to be a high risk patient and was thus transferred to [facility] for general surgery evaluation.  Plan is for general surgery to evaluate him and then decide on next course of action-conservative management versus surgery versus cholecystostomy tube placement by IR.     Cholelithiasis with cholecystitis (acute vs chronic)  Enterococcus hirae bacteremia, 9/27/2023  - Ultrasound abdomen 10/7 showed cholelithiasis with increased wall thickening compared to 9/27.   No pericholecystic fluid and negative sonographic Dill's sign.  Findings equivocal for acute cholecystitis.  HIDA scan recommended.  Post ablation changes in liver.  -Procalcitonin 5.93.  Lactate normal at 1.3.  No leukocytosis.  INR 1.28  -Nontender in right upper quadrant  -Mild elevation in LFTs-alkaline phosphatase 254, ALT 43, AST 49, total bilirubin 0.5  Plan:  -Enterococcal bacteremia likely secondary to cholecystitis -> sensitive to ampicillin.  Will continue IV Unasyn -> augmentin at discharge  -Consulted general surgery.  Further management will be as per general surgery's recommendations -> HIDA scan was Negative for acute cholecystitis and biliary dyskinesia.   -Pain control with Tylenol, IV/p.o. Dilaudid  -Zofran for nausea or vomiting  -Can continue IV fluids and advance diet  -Follow BCs -> NGTD  -PT / OT eval -> [facility]      Acute kidney injury  Mild anion gap metabolic acidosis  -Creatinine of 2.5, up from baseline of 1.  This is likely prerenal   -IV fluids-LR +20 mEq KCl at 100 mL/h  -> can stop tomorrow pending Cr  -Avoid nephrotoxic medications  -Monitor labs     Severe hypokalemia, K 2.2   -Due to poor oral intake, nausea and diarrhea   -Administer IV fluids-LR +20 mEq KCl at 100 mL/h   -Placed on potassium protocol   -Follow magnesium and phosphorous and replace as needed      Diarrhea  -Reports after 2 episodes of loose stools per day.  Reports was treated with antibiotics recently but does not remember for what reason.  -Check for C. Difficile -> negative      Acute metabolic encephalopathy  -Somewhat confused, has difficulty answering questions, keeps falling asleep  -This is likely secondary to acute illness and really dehydration with  -Supportive management and IV fluids.  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  Continue metoprolol and rosuvastatin     Hypertension  Dyslipidemia  -Continue Toprol-XL, rosuvastatin     Generalized weakness  Recurrent falls  -Secondary to hypovolemia and acute illness  -Consult PT/OT  - consult for discharge planning    ASSESSMENT           Discharge Assessment  How are you doing now that you are home?: per patient report, he is doing OK. he said it is a slow road. he said he is feeling better. he has all of his medications. patient has a follow up appt 10/19 with PCP. patient has no needs at this time.  How are your symptoms? (Red Flag symptoms escalate to triage hotline per guidelines): Improved  Do you feel your condition is stable enough to be safe at home until your provider visit?: Yes  Does the patient have their discharge instructions? : Yes  Does the patient have questions regarding their discharge instructions? : No  Were you started on any new medications or were there changes to any of your previous medications? : Yes  Does the patient have all of their medications?: Yes  Do you have questions regarding any of your medications? : No  Do you have all of your needed medical supplies or equipment (DME)?  (i.e. oxygen tank, CPAP, cane, etc.): Yes  Discharge follow-up appointment scheduled within 14 calendar days? : Yes  Discharge Follow Up Appointment Date: 10/19/23  Discharge Follow Up Appointment Scheduled with?: Primary Care Provider         Post-op (Clinicians Only)  Did the patient have surgery or a procedure: No  Fever: No  Chills: No  Eating & Drinking: eating and drinking without complaints/concerns  PO Intake: regular diet  Bowel Function: normal  Urinary Status: voiding without complaint/concerns      PLAN                                                      Outpatient Plan:      Discharge Orders          Reason for your hospital stay      Treatment of a blood infection          Follow-up and recommended labs and tests      Follow up with primary care provider, Monico Rivers, within 7 days for hospital follow- up.  The following labs/tests are recommended: complete metabolic profile and complete blood count.          Activity     Your activity upon discharge: activity as tolerated          Diet     Follow this diet upon discharge: regular       Future Appointments   Date Time Provider Department Center   10/19/2023  2:20 PM Monico Rivers MD [facility]         For any urgent concerns, please contact our 24 hour nurse triage line: [phone] ([phone])       Patient declines care coordination at this time.    Rossana Moore RN

## 2023-10-15 PROBLEM — K85.10 GALLSTONE PANCREATITIS: Status: ACTIVE | Noted: 2023-01-01

## 2023-10-15 NOTE — PROGRESS NOTES
[facility]    Medicine Progress Note - Hospitalist Service    Date of Admission:  10/15/2023    Assessment & Plan   Quan Murphy is a 73 year old male admitted to [facility] on October 15 for gallstone pancreatitis. Pt was recently hospitalized from 10/7-10/10/23 . Quan Murphy is a 73 year old male with metastatic squamous cell carcinoma of tonsils, s/p chemoradiation, recent ablation of liver metastasis on 9/19/2023, CAD, hypertension, hyperlipidemia  He presented to [facility] ER on 9/27 with fever and abdominal pain.  Ultrasound showed acute cholecystitis with cholelithiasis.  He declined admission and left AMA.  Blood culture grew Enterococcus hirae sensitive to ampicillin.  He did not receive antibiotics for this.  Multiple attempts were made to contact the patient but were unsuccessful.  His abdominal pain subsequently resolved.  He presented to [facility] ER again on 10/7 with 1 week of diarrhea, nausea, anorexia, poor oral intake 10 pound weight loss in 1 week generalized weakness, dizziness and recurrent falls.    Work-up showed severe hypokalemia with potassium of 2.2 acute kidney injury with creatinine of 2.5.  There was concern that his symptoms are likely related to acute cholecystitis with cholelithiasis.  He was felt to be a high risk patient and was thus transferred to [facility] for general surgery evaluation. Consulted general surgery. HIDA scan was Negative for acute cholecystitis and biliary dyskinesia. No surgery recommended. Symptomatic treatment.Patient presented with ongoing abdominal pain, N/V and unable to eat due to pain and was only drinking sprite PTA       Choledocholithiasis  Gallstone pancreatitis  Acute metabolic encephalopathy  Patient presented to the emergency room with a 5-day history of nausea, vomiting, abdominal pain.  Blood work was significant for an ALP elevation and lipase elevation.  CT scan revealed cholelithiasis  with a density in the region of the distal common bile duct near the ampulla most likely representing choledocholithiasis.  Peripancreatic edema was also seen consistent with pancreatitis.  The patient has been having ongoing issues with acute cholecystitis and cholelithiasis since September and would benefit from cholecystectomy while inpatient.  Patient was not able to tolerate diet since studies of discharge and was only drinking Sprite as per wife at bedside  -N.p.o.  -IV Zosyn  -IV fluids  -Follow-up GI consult with Dr. Blood and GEN surge consult   planning on ERCP tomorrow     Enterococcal bacteremia prior to admission          1/2 bottles positive on 9/27 at OSH- patient refused treatment and left.   Repeat blood cultures remain negative   He was treated with Unasyn and discharged on Augmentin.  Blood cultures repeated today   On IV zosyn now     Severe hypokalemia at 1.6  Likely due to GI losses from his vomiting.  Asymptomatic with generalized weakness and EKG changes.  Potassium has significantly improved with replacement and last check was 2.8 prior to transfer; however I believe that this is an erroneous lab value as the patient had only received 30 mill equivalents of potassium prior to rechecking.  -Follow-up repeat chemistry  -Potassium replacement protocol  -[facility] for close potassium monitoring.    Replace and recheck with close monitoring    SURESH  Creatinine was 1.09 on September 27, admission creatinine is 1.98.  Presumably hypovolemic secondary to his nausea, and vomiting.  -IV hydration  -Monitor renal function  -Avoid nephrotoxic medications    Metastatic squamous cell carcinoma of the tonsils status post chemoradiation  Mets to the liver status post ablation on September 19, 2023  -Follows at the [facility]  .  Coronary artery disease  Hypertension  Dyslipidemia  Chest pain-free on exam.  EKG is abnormal with changes consistent of hypokalemia as mentioned above.  -Resume PTA metoprolol  "and Crestor once med rec is complete                Diet: NPO for Medical/Clinical Reasons Except for: Meds, Ice Chips    DVT Prophylaxis: Pneumatic Compression Devices  Stone Catheter: Not present  Lines: None     Cardiac Monitoring: ACTIVE order. Indication: Electrolyte Imbalance (24 hours)- Magnesium <1.3 mg/ml; Potassium < =2.8 or > 5.5 mg/ml  Code Status: Full Code      Clinically Significant Risk Factors Present on Admission        # Hypokalemia: Lowest K = 1.35 mmol/L in last 2 days, will replace as needed    # Hypercalcemia: Highest Ca = 10.3 mg/dL in last 2 days, will monitor as appropriate    # Hypoalbuminemia: Lowest albumin = 2.9 g/dL at 10/15/2023  5:04 AM, will monitor as appropriate     # Hypertension: Noted on problem list      # Overweight: Estimated body mass index is 27.22 kg/m  as calculated from the following:    Height as of 10/7/23: 1.727 m (5' 8\").    Weight as of 10/14/23: 81.2 kg (179 lb).              Disposition Plan      Expected Discharge Date: 10/17/2023                    Rosalinda Moore MD  Hospitalist Service  [facility]  Securely message with [employer] (more info)  Text page via [facility] Paging/Directory   ______________________________________________________________________    Interval History   Patient is resting comfortably in bed.  Potassium is still low at 1.6 this morning, getting replaced.  Patient complains of abdominal pain with eating and has not been tolerating diet since recent discharge.  Wife is at bedside and is appropriately emotional.  Reassurance given.  Discussed with bedside RN and patient and his wife today.  Discussed with Dr. Blood  planning on ERCP tomorrow    Physical Exam   Vital Signs: Temp: 97.7  F (36.5  C) Temp src: Oral BP: 122/75 Pulse: 64   Resp: 18 SpO2: 97 % O2 Device: None (Room air)    Weight: 0 lbs 0 oz    General Appearance: Alert awake, not in acute distress  Respiratory: Clear to auscultation bilaterally, diminished in " the bases  Cardiovascular: Normal rate rhythm regular  GI: Right upper quadrant and epigastric tenderness present, abdomen nondistended soft, bowel sounds positive no guarding rigidity or rebound noted  Skin: Warm and dry  Psychiatric mood and affect are normal  Neurological; able to move all extremities    Medical Decision Making       55 MINUTES SPENT BY ME on the date of service doing chart review, history, exam, documentation & further activities per the note.      Data     I have personally reviewed the following data over the past 24 hrs:    6.0  \   11.6 (L)   / 222     140 115 (H) 11.2 /  101 (H)   1.6 (LL) 12 (L) 1.74 (H) \     ALT: 18 AST: 23 AP: 248 (H) TBILI: 0.6   ALB: 2.9 (L) TOT PROTEIN: 6.0 (L) LIPASE: 1,943 (H)     Procal: 0.38 (H) CRP: 15.45 (H) Lactic Acid: N/A       INR:  1.14 PTT:  N/A   D-dimer:  N/A Fibrinogen:  N/A       Imaging results reviewed over the past 24 hrs:   Recent Results (from the past 24 hour(s))   CT Abdomen Pelvis w/o Contrast    Narrative    EXAM: CT ABDOMEN PELVIS W/O CONTRAST  LOCATION: [facility]  DATE: 10/14/2023    INDICATION: Severe abdominal pain with intractable nausea and vomiting.  History of recent liver ablation secondary to metastatic cancer lesion  COMPARISON: CT from 09/27/2023.  TECHNIQUE: CT scan of the abdomen and pelvis was performed without IV contrast. Multiplanar reformats were obtained. Dose reduction techniques were used.  CONTRAST: None.    FINDINGS:   LOWER CHEST: Bibasilar atelectasis.    HEPATOBILIARY: Again noted is a vague hypodense, centrally hyperdense lesion in the periphery of hepatic segment 8 abutting the liver capsule measuring roughly 7 x 6 cm on image 46, not significantly changed compared to prior study given differences in   technique. There is some inflammatory change external to the liver capsule. There is also a more indistinct hypodense focus in segment 5 measuring roughly 3.2 x 3.0 cm on image 68,  again similar to prior study given differences in contrast and   noncontrast enhanced technique. A few small gallstones. There is focal density in the region of the distal common bile duct near the ampulla as seen on image 87 of series 3 and image 43 of the coronal series. The common bile duct measures up to 10 mm on   coronal image 43 as well.    PANCREAS: Mild, diffuse peripancreatic edema.    SPLEEN: Normal.    ADRENAL GLANDS: Normal.    KIDNEYS/BLADDER: Small, nonobstructing bilateral intrarenal calculi. No hydronephrosis. Normal bladder.    BOWEL: Colonic diverticulosis. Normal caliber small bowel.    LYMPH NODES: Normal.    VASCULATURE: Mild aortoiliac atherosclerotic change. Right external iliac arterial stent in place though patency cannot be assessed given absence of contrast.    PELVIC ORGANS: Small fat-containing left inguinal hernia. Status post prostatectomy.    MUSCULOSKELETAL: Osteopenia with severe lumbar spine degenerative disc changes. Arthritic change of the hips.      Impression    IMPRESSION:   1.  Cholelithiasis with density in the region of the distal common bile duct near the ampulla likely to represent choledocholithiasis. Additionally, there is peripancreatic edema consistent with changes of pancreatitis. Recommend correlation with serum   lipase levels.    2.  No significant change in the evolving posttreatment findings of segment 5 and segment 8 liver lesions.    3.  Multiple nonobstructing bilateral renal calculi.    4.  Colonic diverticulosis.     US Abdomen Limited (RUQ)    Narrative    EXAM: US ABDOMEN LIMITED  LOCATION: [facility]  DATE: 10/15/2023    INDICATION: Abdominal pain, intractable nausea vomiting, elevated lipase, abnormal CT scan with possible choledocholithiasis.  COMPARISON: CT abdomen and pelvis without IV contrast 10/14/2023.  TECHNIQUE: Limited abdominal ultrasound.    FINDINGS:    GALLBLADDER: Dependent stones and sludge. Mild wall  thickening measuring 0.4 cm. No pericholecystic fluid or sonographic tenderness.    BILE DUCTS: No biliary dilatation. The common duct measures 6.0 mm. Tiny stones in the distal CBD seen on CT are not identifiable on ultrasound.    LIVER: Echogenic mass in the right hepatic lobe measures 2.1 x 2.0 cm. Larger zone of heterogeneity in the right hepatic lobe measuring 6.1 x 4.5 cm corresponding to larger lesion in the right hepatic lobe. Smooth hepatic contour. Patent portal vein with   normal directional flow.    RIGHT KIDNEY: No stones, masses or hydronephrosis. Right kidney measures 10.3 cm [id number].    PANCREAS: Obscured due to overlying bowel gas.    No ascites.      Impression    IMPRESSION:  1.  Distal common bile duct obscured due to overlying bowel gas, therefore, tiny stones noted on CT are not identifiable. Dependent stones and sludge in the gallbladder with mild wall thickening. No acute inflammatory signs.    2.  Heterogeneous masslike lesions in the right hepatic lobe corresponding to CT.    3.  Pancreas obscured due to overlying bowel gas.

## 2023-10-15 NOTE — PLAN OF CARE
Orientation: A/Ox4    Vitals/Tele: VSS - SR, LSC - RA, Normotensive, afebrile    IV Access/drains: 2 PIV    Diet: NPO     Mobility: Ax1 GB    GI/: BS audible, Adequate UO via toilet    Wound/Skin: Blanchable redness to scarum    Consults: Cards, Gen surg     Discharge Plan: ERCP tomorrow followed by cholecystectomy - will transfer to  at 2300       See Flow sheets for assessment    Problem: Electrolyte Imbalance  Goal: Electrolyte Balance  Outcome: Not Progressing   Goal Outcome Evaluation:

## 2023-10-15 NOTE — CONSULTS
73 year old male admitted to [facility] on October 15 for gallstone pancreatitis.   Choledocholithiasis  Gallstone pancreatitis  Acute metabolic encephalopathy  5-day history of nausea, vomiting, abdominal pain.  Blood work was significant for an ALP elevation and lipase elevation.  CT scan revealed cholelithiasis with a density in the region of the distal common bile duct near the ampulla most likely representing choledocholithiasis.  Peripancreatic edema was also seen consistent with pancreatitis.   Significant electrolyte abnormalities. Low K  NPO  Plan for ERCP when metabolic issues stablized    Trent Blood MD FACP   [facility]

## 2023-10-15 NOTE — PHARMACY-ADMISSION MEDICATION HISTORY
Pharmacy Intern Admission Medication History    Admission medication history is complete. The information provided in this note is only as accurate as the sources available at the time of the update.    Information Source(s): Family member and CareEverywhere/SureScripts via in-person    Pertinent Information: Updated med list and last doses per patient's wife testimony (Cheri).   Since discharge from the hospital on 10/10, patient only had Oxycodone 10 mg yesterday. He wasn't able to take any other meds.     Recent antimicrobials:  On 10/10, patient was prescribed Amoxicillin 875 mg for bacteriemia with instructions of take 1 table by mouth 2 times a day for 10 days. Patient didn't start the course of antibiotics.        Changes made to PTA medication list:  Added: None  Deleted: None  Changed: None      Allergies reviewed with patient and updates made in EHR: yes    Medication History Completed By: Priti Hernandez 10/15/2023 10:40 AM    PTA Med List   Medication Sig Last Dose    amoxicillin (AMOXIL) 875 MG tablet Take 1 tablet (875 mg) by mouth 2 times daily for 10 days     cetirizine (ZYRTEC) 10 MG tablet Take 1 tablet (10 mg) by mouth daily     citalopram (CELEXA) 40 MG tablet TAKE 1 TABLET BY MOUTH ONCE DAILY     DULoxetine (CYMBALTA) 60 MG capsule Take 1 capsule (60 mg) by mouth daily     famotidine (PEPCID) 20 MG tablet Take 20 mg by mouth 2 times daily as needed (heartburn)     levothyroxine (SYNTHROID/LEVOTHROID) 100 MCG tablet Take 100 mcg by mouth daily     metoprolol succinate ER (TOPROL XL) 50 MG 24 hr tablet Take 0.5 tablets (25 mg) by mouth daily     naloxone (NARCAN) 4 MG/0.1ML nasal spray Spray 1 spray (4 mg) into one nostril alternating nostrils as needed for opioid reversal every 2-3 minutes until assistance arrives  at prn    nitroGLYcerin (NITROSTAT) 0.4 MG sublingual tablet For chest pain place 1 tablet under the tongue every 5 minutes for 3 doses. If symptoms persist 5 minutes after 1st dose  call 911.  at prn    omeprazole (PRILOSEC) 40 MG DR capsule Take 1 capsule (40 mg) by mouth daily as needed (heartburn)     ondansetron (ZOFRAN ODT) 4 MG ODT tab DISSOLVE 1 TABLET (4 MG) BY MOUTH EVERY 8 HOURS AS NEEDED FOR NAUSEA     oxyCODONE (ROXICODONE) 5 MG tablet TAKE 1-2 TABLETS (5-10 MG) BY MOUTH EVERY 6 HOURS AS NEEDED FOR SEVERE PAIN (LIMIT TO 6 TABLETS PER DAY) 10/14/2023 at am    pregabalin (LYRICA) 150 MG capsule Take 1 capsule (150 mg) by mouth daily     rosuvastatin (CRESTOR) 40 MG tablet Take 1 tablet (40 mg) by mouth daily     zolpidem (AMBIEN) 5 MG tablet TAKE 1 TABLET (5 MG) BY MOUTH NIGHTLY AS NEEDED FOR SLEEP

## 2023-10-15 NOTE — PLAN OF CARE
Goal Outcome Evaluation:  Neuro: A/ox4. Forgetful. Intact.  CV: Tele NSR. B/ps stable.  Pulm: LS CTA. RA  GI/: Tolerating npo diet with ice chips, meds with sips. Passing flatus, BS +. Voiding to urinal, auop.  Pain: C/o abdominal pain, controlled with rest and IV dilaudid.   Activity: A1-2 GB, standing at side of bed for urinal/BSC. Hx of freq falls.  Skin: Intact.  Labs: Last K+ 2.1, protocol ordered, recheck at 2044.    Lines/drips: PIV x2, patent infusing TKO inbetween Abx.    Plan: Continue current POC. K+ replacement protocol. Gen surg and GI consults pending. Plan for ERCP. Falls precautions.

## 2023-10-15 NOTE — PLAN OF CARE
Goal Outcome Evaluation:      Plan of Care Reviewed With: patient, spouse    Overall Patient Progress: no change    Outcome Evaluation: K <1.5 at [facility], 1.6 on arrival    DATE & TIME: 10/15/23 admit from [facility] - 0700    COGNITION/BEHAVIOR: A&Ox4  Vital signs:  Temp: 98.6  F (37  C) Temp src: Oral BP: 138/85 Pulse: 76   Resp: 18 SpO2: 97 % via RA  TELEMETRY RHYTHM: SR  MOBILITY: Ax1 to standing at bedside to use urinal  PAIN: 4/10 generalized abdominal pain  DIET: NPO, ice chips  RESP: Lung sounds clear  CV: HRRR.  [facility] did report trigeminal PVC's towards end of ride here.  GI/: Voiding adequately.  Constipated per report.  PV/NV: Grossly intact.  PCD's on.  SKIN: Blanchable erythema to the sacral region and dorsal feet; was wearing shoes without socks  LINES: One PIV infusing IVF bolus, then maintenance fluids and one infusing potassium  LAB/BG: K 1.6; replacement started, due for 60 mEq, but will have his potassium rechecked before completing (10am and 3pm)  TESTS/PROCEDURES: Gen Surg and GI consults pending  OTHER: [facility] status

## 2023-10-15 NOTE — H&P
[facility]    History and Physical  Hospitalist     Date of Admission:  10/15/2023    Assessment & Plan   Quan Murphy is a 73 year old male admitted to [facility] on October 15 for gallstone pancreatitis.    Choledocholithiasis  Gallstone pancreatitis  Acute metabolic encephalopathy  Patient presented to the emergency room with a 5-day history of nausea, vomiting, abdominal pain.  Blood work was significant for an ALP elevation and lipase elevation.  CT scan revealed cholelithiasis with a density in the region of the distal common bile duct near the ampulla most likely representing choledocholithiasis.  Peripancreatic edema was also seen consistent with pancreatitis.  The patient has been having ongoing issues with acute cholecystitis and cholelithiasis since September and would benefit from cholecystectomy while inpatient.  Patient is slow to answer questions I suspect he is developing an acute metabolic encephalopathy.  -N.p.o.  -IV Zosyn  -IV fluids  -Follow-up GI consult with Dr. Blood and GEN surge consult    Hypokalemia  Likely due to GI losses from his vomiting.  Asymptomatic with generalized weakness and EKG changes.  Potassium has significantly improved with replacement and last check was 2.8 prior to transfer; however I believe that this is an erroneous lab value as the patient had only received 30 mill equivalents of potassium prior to rechecking.  -Follow-up repeat chemistry  -Potassium replacement protocol  [place] [place]    Addendum: repeat K at 1.6, will continue to replace. Recheck at 1000.     SURESH  Creatinine was 1.09 on September 27, admission creatinine is 1.98.  Presumably hypovolemic secondary to his nausea, and vomiting.  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  Coronary artery disease  Hypertension  Dyslipidemia  Chest pain-free on exam.  EKG is abnormal with changes consistent of hypokalemia as mentioned above.  -Resume PTA metoprolol and Crestor once med rec is complete    DVT ppx: SCDs  Expected length of stay greater than 2 days  Code Status: Full code      Clinically Significant Risk Factors Present on Admission        # Hypokalemia: Lowest K = 1.35 mmol/L in last 2 days, will replace as needed    # Hypercalcemia: Highest Ca = 10.3 mg/dL in last 2 days, will monitor as appropriate    # Hypoalbuminemia: Lowest albumin = 3.4 g/dL at 10/14/2023  9:55 PM, will monitor as appropriate     # Hypertension: Noted on problem list      # Overweight: Estimated body mass index is 27.22 kg/m  as calculated from the following:    Height as of 10/7/23: 1.727 m (5' 8\").    Weight as of 10/14/23: 81.2 kg (179 lb).                  Primary Care Physician   Monico Rivers    Chief Complaint   No chief complaint on file.    History obtained from the patient    History of Present Illness   Quan Murphy is a 73 year old male with a past medical history of acute cholecystitis on September 27 for which the patient chose to leave the hospital AMA presents back to the hospital with nausea, vomiting, abdominal pain for 5 days.  He reports decreased appetite during this period.  He was experiencing fevers and reports a Tmax of 104  F at home.  He also endorses chills.  In the emergency room he was evaluated and found to have an elevated lipase.  Imaging was obtained which revealed a likely distal common bile duct stone gallstone as well as pancreatitis.  The patient was transferred to [facility] for further management and likely ERCP.  On arrival the patient reports that his abdominal pain is a 4 out of 10.  He is aware of the plan and in agreement.  He provides no other complaints.    Past Medical History    I have reviewed this patient's " medical history and updated it with pertinent information if needed.   Past Medical History:   Diagnosis Date    Allergic rhinitis     Allergy, unspecified not elsewhere classified     Seasonal allergies, pollen, dust, smoke and animals    Antiplatelet or antithrombotic long-term use     Anxiety     Arthritis     Chest pain     Chronic sinusitis     Coronary atherosclerosis of unspecified type of vessel, native or graft     Coronary artery disease    Depressive disorder 1995    Gastroesophageal reflux disease 2020    Cleared with medication    Head injury 1954    Hiatal hernia 2015    Right Side    History of blood transfusion 12/15/2004    Prostate Surgery - My own blood    Hyperlipidemia     Hypertension     Inguinal hernia     Kidney problem 10/08/2017    Lithotripsy    Kidney stones     Malignant neoplasm of prostate (H)     Prostate cancer    Prostate cancer (H)     Syncope     Tonsil cancer (H)        Past Surgical History   I have reviewed this patient's surgical history and updated it with pertinent information if needed.  Past Surgical History:   Procedure Laterality Date    ARTHRODESIS FOOT  7/23/2013    Procedure: ARTHRODESIS FOOT;  Great Toe Arthrodesis Left Foot;  Surgeon: Ash Gonzalez DPM;  Location: [facility] OR    ARTHRODESIS FOOT  6/10/2014    Procedure: ARTHRODESIS FOOT;  Surgeon: Ash Gonzalez DPM;  Location: [facility] OR    BIOPSY  10/1/2004    dermatologist biopsies    COLONOSCOPY  10/7/2013    Procedure: COLONOSCOPY;  Colonoscopy;  Surgeon: Mike Fallon MD;  Location:  GI    CYSTOSCOPY N/A 2/16/2022    Procedure: CYSTOSCOPY and bladder stone removal;  Surgeon: David Rogers MD;  Location: [facility] OR    ESOPHAGOSCOPY, GASTROSCOPY, DUODENOSCOPY (EGD), COMBINED N/A 2/12/2020    Procedure: ESOPHAGOGASTRODUODENOSCOPY (EGD);  Surgeon: Sam Escobar MD;  Location:  GI    EXTRACORPOREAL SHOCK WAVE LITHOTRIPSY (ESWL) Bilateral 10/18/2017    Procedure: EXTRACORPOREAL SHOCK WAVE LITHOTRIPSY  (ESWL);  BILATERAL EXTRACORPOREAL SHOCKWAVE LITHOTRIPSY ;  Surgeon: Meir Torres MD;  Location: [facility] OR    HC CORRECT BUNION,SIMPLE  08/11/2005    x3    HC REMV TOE BENIGN BONE LESN  08/11/2005    HEAD & NECK SURGERY  1954    From a fall    HERNIORRHAPHY INGUINAL  7/3/2013    Procedure: HERNIORRHAPHY INGUINAL;  Open Repair Inguinal hernia Right with mesh ;  Surgeon: Sam Escobar MD;  Location: [facility] OR    IR GASTROSTOMY TUBE PERCUTANEOUS PLCMNT  6/7/2021    MOHS MICROGRAPHIC PROCEDURE  08/23/11    ear and chin-[facility]    OPEN REDUCTION INTERNAL FIXATION WRIST Right 7/18/2017    Procedure: OPEN REDUCTION INTERNAL FIXATION WRIST;  Right distal radius open reduction and internal fixation;  Surgeon: Pedro Blanca DO;  Location: [facility] OR    RECONSTRUCT FOREFOOT WITH METATARSOPHALANGEAL (MTP) FUSION  6/10/2014    Procedure: RECONSTRUCT FOREFOOT WITH METATARSOPHALANGEAL (MTP) FUSION;  Surgeon: Ash Gonzalez DPM;  Location: [facility] OR    STENT, CORONARY, DEMI      SURGICAL HISTORY OF -   1999/1974    lt knee    SURGICAL HISTORY OF -   10/2004    lithotripsy    SURGICAL HISTORY OF -   11/05    angiogram with stent    VASCULAR SURGERY  11/17/2005    Puncture of iliac artery during and angiogram    [facility] LAPAROSCOPY, SURGICAL PROSTATECTOMY, RETROPUBIC RADICAL, W/NERVE SPARING  11/30/2004    With full bilateral pelvic lymphadenectomy.  [facility].    [facility] TOTAL KNEE ARTHROPLASTY  05/01/08    Left knee       Allergies   Allergies   Allergen Reactions    Animal Dander     Azithromycin Nausea and Vomiting    Dust Mites     Pollen Extract     Smoke.        Social History   I have reviewed this patient's social history and updated it with pertinent information if needed. Quan Murphy  reports that he has never smoked. He has never used smokeless tobacco. He reports current alcohol use of about 10.0 standard drinks of alcohol per week. He reports that he does not use drugs.    Family History   I have  reviewed this patient's family history and updated it with pertinent information if needed.   Family History   Problem Relation Age of Onset    Hypertension Father         Lived to age 87    Connective Tissue Disorder Mother         LUPUS    Heart Disease Mother         Valve replacement    Anxiety Disorder Mother         Lived to age 84    Dementia Mother         Nursing Home (lived to age 86)       Review of Systems   The 10 point Review of Systems is negative other than noted in the HPI or here.     Physical Exam                      Vital Signs with Ranges  Temp:  [97.9  F (36.6  C)] 97.9  F (36.6  C)  Pulse:  [73-91] 73  Resp:  [17-18] 17  BP: (116-137)/(71-89) 116/71  SpO2:  [99 %] 99 %  0 lbs 0 oz  Physical Exam  Vitals reviewed.   Constitutional:       Appearance: Normal appearance.      Comments: Patient seen resting bed comfortably no apparent distress on the medical unit.  He is somewhat slow to answer questions, but with time is able to answer questions appropriately and accurately.   HENT:      Head: Normocephalic and atraumatic.      Mouth/Throat:      Mouth: Mucous membranes are moist.      Pharynx: Oropharynx is clear.   Eyes:      Extraocular Movements: Extraocular movements intact.      Conjunctiva/sclera: Conjunctivae normal.      Pupils: Pupils are equal, round, and reactive to light.   Cardiovascular:      Rate and Rhythm: Normal rate and regular rhythm.      Pulses: Normal pulses.      Heart sounds: Normal heart sounds. No murmur heard.  Pulmonary:      Effort: Pulmonary effort is normal.      Breath sounds: Normal breath sounds. No wheezing, rhonchi or rales.   Abdominal:      General: Abdomen is flat.      Palpations: Abdomen is soft.      Tenderness: There is abdominal tenderness.      Comments: Diffuse tenderness to palpation without rebound or guarding.  No palpable mass.  Hypoactive bowel sounds.   Musculoskeletal:         General: No swelling. Normal range of motion.      Cervical back:  Normal range of motion and neck supple.   Skin:     General: Skin is warm and dry.   Neurological:      General: No focal deficit present.      Mental Status: He is alert and oriented to person, place, and time. Mental status is at baseline.      Cranial Nerves: No cranial nerve deficit.           Medical Decision Making       75 MINUTES SPENT BY ME on the date of service doing chart review, history, exam, documentation & further activities per the note.

## 2023-10-15 NOTE — ED PROVIDER NOTES
History     Chief Complaint   Patient presents with    Generalized Weakness     HPI  Quan Murphy is a 73 year old male who presented to the emergency room via ambulance from his home secondary to concerns of inability to tolerate any food for the last 5 days since returning home from a hospital stay at [facility].  Patient has been nauseated and vomiting and unable to tolerate his medications as well as any food except for some sips of Sprite or orange soda.  Patient was hospitalized at [facility] for 3 days secondary to concerns of possible acute cholecystitis as reason for his nausea, vomiting, anorexia, and diarrhea symptoms.  It was found through evaluation including a HIDA scan that no surgery was recommended and the patient was subsequently discharged home.  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  He is being treated at the [facility] for his squamous cell cancer issues.  Patient recently underwent an ablation of liver metastasis on 9/19/2023.  Patient was also with a history for kidney failure and hypokalemia which was corrected during his hospital stay at [facility].  His wife is a retired registered nurse and has been caring for him but states it is getting to the point where she can no longer care for him appropriately.  The she states that he fell twice today and she has difficulty getting him up off the floor because of his severe weakness.  She states that she is hoping that maybe is just his potassium level again in that is the reason for his weakness but she is not sure she can continue caring for him at their home in his current state.  She states that he has not eaten anything in the last 5 days since returning home from [facility] and is also not had any bowel movements.  He has multiple episodes of vomiting through the day that have been bile-like but not bloody.  She is not aware of any  fever.  Patient states that he did not injure himself in the falls today.  His wife is concerned for possible worsening of the cancer metastasis to his abdomen as reason for his continued abdominal pain.  Patient states that his pain is around his bellybutton he has noted some increased distention.  She is worried about possible bowel obstruction.  His wife states that the patient is mad at her because she keeps bringing him back to the ER where he has to undergo all these tests.  I asked if he is at the point where he desires testing not be done but he states that he still has hopes for cure of his cancer.  He states that he has an appointment with [facility] to discuss immunotherapy for treatment of his cancer.  His wife is hoping that he can stay here in the hospital for a day or 2 to hopefully improve his strength and then she is going to try to figure out options for continued care of him as she is not sure she can continue to do it at home by herself.    0/10/23  Discharge Disposition   Discharged to home  Condition at discharge: Stable     Hospital Course  Quan Murphy is a 73 year old male with metastatic squamous cell carcinoma of tonsils, s/p chemoradiation, recent ablation of liver metastasis on 9/19/2023, CAD, hypertension, hyperlipidemia  He presented to [facility] ER on 9/27 with fever and abdominal pain.  Ultrasound showed acute cholecystitis with cholelithiasis.  He declined admission and left AMA.  Blood culture grew Enterococcus hirae sensitive to ampicillin.  He did not receive antibiotics for this.  Multiple attempts were made to contact the patient but were unsuccessful.  His abdominal pain subsequently resolved.  He presented to [facility] ER again on 10/7 with 1 week of diarrhea, nausea, anorexia, poor oral intake 10 pound weight loss in 1 week generalized weakness, dizziness and recurrent falls.    Work-up showed severe hypokalemia with potassium of 2.2 acute kidney injury with creatinine  of 2.5.  There was concern that his symptoms are likely related to acute cholecystitis with cholelithiasis.  He was felt to be a high risk patient and was thus transferred to [facility] for general surgery evaluation.  The plan was for general surgery to evaluate him and then decide on next course of action-conservative management versus surgery versus cholecystostomy tube placement by IR.     Cholelithiasis  Ultrasound abdomen 10/7 showed cholelithiasis with increased wall thickening compared to 9/27.  No pericholecystic fluid and negative sonographic Dill's sign.  Findings equivocal for acute cholecystitis.  HIDA scan recommended.  Post ablation changes in liver.  Pro-calcitonin 5.93.  Lactate normal at 1.3.  No leukocytosis.  INR 1.28.  Non-tender in right upper quadrant.  Mild elevation in LFTs-alkaline phosphatase 254, ALT 43, AST 49, total bilirubin 0.5  Consulted general surgery. HIDA scan was Negative for acute cholecystitis and biliary dyskinesia. No surgery recommended. Symptomatic treatment.     Enterococcal bacteremia prior to admission   1/2 bottles positive on 9/27 at OSH- patient refused treatment and left.  Blood cultures this admission negative.   Urinalysis negative for infection.  Transthoracic echocardiogram- no evidence of endocarditis   Tmax during admission 98.3F  Pro-calcitonin 5.93  C-reactive protein 30   WBC <10K  He was treated with Unasyn and discharged on Augmentin.  Follow up with primary care provider      Acute kidney injury  Mild anion gap metabolic acidosis        Creatinine   Date Value Ref Range Status   10/09/2023 2.01 (H) 0.67 - 1.17 mg/dL Final   07/02/2021 1.14 0.66 - 1.25 mg/dL Final   Creatinine of 2.5, up from baseline of 1.  This is likely prerenal   Creatinine improving.   Follow up with primary care provider      Severe hypokalemia, K 2.2         Potassium   Date Value Ref Range Status   10/09/2023 3.6 3.4 - 5.3 mmol/L Final   10/09/2023 3.7 3.4 - 5.3  mmol/L Final   12/15/2022 4.3 3.4 - 5.3 mmol/L Final   07/02/2021 4.1 3.4 - 5.3 mmol/L Final   Due to poor oral intake, nausea and diarrhea   Replaced per protocol.  Follow up with primary care provider      Diarrhea  Reports after 2 episodes of loose stools per day.  Reports was treated with antibiotics recently but does not remember for what reason.  C. Difficile negative and diarrhea has resolved.     Acute metabolic encephalopathy  Somewhat confused on admission, had difficulty answering questions,falling asleep.This was likely secondary to acute illness and dehydration.  MRI Brain negative for acute pathology.  Resolved with supportive care.     Metastatic squamous cell carcinoma of tonsil with metastasis to right seventh rib and liver, diagnosed 3/2021, s/p chemoradiation, s/p recent ablation of liver metastasis on 9/19/2023  Followed by [facility].     CAD, s/p stenting of LAD in 2016   Hypertension  Dyslipidemia  Continue metoprolol and rosuvastatin     Generalized weakness  Recurrent falls  Secondary to hypovolemia and acute illness.  Consulted physcial therapy and occupational therapy- ok to discharge home     Diet:  NPO   DVT Prophylaxis: Pneumatic Compression Devices  Stone Catheter: Not present  Lines: None     Cardiac Monitoring: None  Code Status:  Full code       Study Result    Narrative & Impression   EXAM: NM HEPATOBILIARY SCAN  LOCATION: [facility]  DATE: 10/9/2023     INDICATION: Abdominal pain. Concern for cholecystitis  COMPARISON: CT chest abdomen pelvis 09/27/2023. Abdominal ultrasound 10/07/2023.  TECHNIQUE: 6.5 mCi of Tc-99m mebrofenin, IV. Anterior planar imaging of the abdomen. 1.6 mcg of CCK analog.     FINDINGS: Normal radionuclide activity in the gallbladder, bile ducts, and small bowel. No evidence of cystic or common duct obstruction or intrinsic liver disease. Photopenia in the right hepatic lobe corresponding to the lesion on prior CT. Gallbladder    ejection fraction is 100%, which is within the normal range of 35% or greater.                                                                         IMPRESSION:      Negative for acute cholecystitis and biliary dyskinesia.     Allergies:  Allergies   Allergen Reactions    Animal Dander     Azithromycin Nausea and Vomiting    Dust Mites     Pollen Extract     Smoke.        Problem List:    Patient Active Problem List    Diagnosis Date Noted    Acute cholecystitis 10/07/2023     Priority: Medium    Hypothyroidism due to acquired atrophy of thyroid 03/09/2023     Priority: Medium    Malignant neoplasm metastatic to bone (H) 01/17/2023     Priority: Medium    Pancytopenia (H) 08/16/2022     Priority: Medium    Uncomplicated opioid dependence (H)      Priority: Medium    Chronic kidney disease, stage 3 (H) 06/15/2021     Priority: Medium    Failure to thrive (0-17) 06/02/2021     Priority: Medium     Replacing diagnoses that were inactivated after the 10/1/2021 regulatory import.      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     Priority: Medium    Hypomagnesemia 04/13/2021     Priority: Medium    Hiatal hernia 02/17/2020     Priority: Medium    Renal hematoma 10/28/2017     Priority: Medium    Retroperitoneal hematoma 10/28/2017     Priority: Medium    Syncope 10/18/2017     Priority: Medium    S/P ORIF (open reduction internal fixation) fracture 08/03/2017     Priority: Medium    Closed Colles' fracture of right radius with routine healing, subsequent encounter 08/03/2017     Priority: Medium    Status post insertion of drug-eluting stent into left anterior descending artery for coronary artery disease 01/05/2017     Priority: Medium    Chest pain 12/24/2016     Priority: Medium    Arthropathy 02/04/2014     Priority: Medium     Problem list name updated by automated process. Provider to review      Coronary atherosclerosis      Priority: Medium     Coronary artery disease  Problem list name updated by  automated process. Provider to review      Hallux rigidus 07/16/2013     Priority: Medium    Inguinal hernia 06/28/2013     Priority: Medium    Degenerative arthritis of foot 06/28/2013     Priority: Medium    Hypertension goal BP (blood pressure) < 140/90 06/12/2012     Priority: Medium    Hyperlipidemia LDL goal <130 12/22/2011     Priority: Medium    Anxiety 11/02/2011     Priority: Medium    Allergic conjunctivitis 07/08/2008     Priority: Medium    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, insomnia type 11/17/2006     Priority: Medium     Problem list name updated by automated process. Provider to review      Acute reaction to stress 01/12/2005     Priority: Medium     Problem list name updated by automated process. Provider to review      Chronic maxillary sinusitis 01/12/2005     Priority: Medium    Contracture of palmar fascia 01/12/2005     Priority: Medium    Benign neoplasm of skin of other and unspecified parts of face 01/12/2005     Priority: Medium    Malignant neoplasm of prostate (H) 11/26/2004     Priority: Medium        Past Medical History:    Past Medical History:   Diagnosis Date    Allergic rhinitis     Allergy, unspecified not elsewhere classified     Antiplatelet or antithrombotic long-term use     Anxiety     Arthritis     Chest pain     Chronic sinusitis     Coronary atherosclerosis of unspecified type of vessel, native or graft     Depressive disorder 1995    Gastroesophageal reflux disease 2020    Head injury 1954    Hiatal hernia 2015    History of blood transfusion 12/15/2004    Hyperlipidemia     Hypertension     Inguinal hernia     Kidney problem 10/08/2017    Kidney stones     Malignant neoplasm of prostate (H)     Prostate cancer (H)     Syncope     Tonsil cancer (H)        Past Surgical History:    Past Surgical History:   Procedure Laterality Date    ARTHRODESIS FOOT  7/23/2013    Procedure: ARTHRODESIS FOOT;  Great Toe Arthrodesis Left Foot;  Surgeon: Ash Gonzalez  LUIS Champion;  Location: [facility] OR    ARTHRODESIS FOOT  6/10/2014    Procedure: ARTHRODESIS FOOT;  Surgeon: Ash Gonzalez DPM;  Location: [facility] OR    BIOPSY  10/1/2004    dermatologist biopsies    COLONOSCOPY  10/7/2013    Procedure: COLONOSCOPY;  Colonoscopy;  Surgeon: Mike Fallon MD;  Location: [facility]    CYSTOSCOPY N/A 2/16/2022    Procedure: CYSTOSCOPY and bladder stone removal;  Surgeon: David Rogers MD;  Location: [facility]    ESOPHAGOSCOPY, GASTROSCOPY, DUODENOSCOPY (EGD), COMBINED N/A 2/12/2020    Procedure: ESOPHAGOGASTRODUODENOSCOPY (EGD);  Surgeon: Sam Escobar MD;  Location: [facility]    EXTRACORPOREAL SHOCK WAVE LITHOTRIPSY (ESWL) Bilateral 10/18/2017    Procedure: EXTRACORPOREAL SHOCK WAVE LITHOTRIPSY (ESWL);  BILATERAL EXTRACORPOREAL SHOCKWAVE LITHOTRIPSY ;  Surgeon: Meir Torres MD;  Location: [facility] OR    HC CORRECT BUNION,SIMPLE  08/11/2005    x3    HC REMV TOE BENIGN BONE LESN  08/11/2005    HEAD & NECK SURGERY  1954    From a fall    HERNIORRHAPHY INGUINAL  7/3/2013    Procedure: HERNIORRHAPHY INGUINAL;  Open Repair Inguinal hernia Right with mesh ;  Surgeon: Sam Escobar MD;  Location: [facility]    IR GASTROSTOMY TUBE PERCUTANEOUS PLCMNT  6/7/2021    MOHS MICROGRAPHIC PROCEDURE  08/23/11    ear and chin-[facility]    OPEN REDUCTION INTERNAL FIXATION WRIST Right 7/18/2017    Procedure: OPEN REDUCTION INTERNAL FIXATION WRIST;  Right distal radius open reduction and internal fixation;  Surgeon: Pedro Blanca DO;  Location: [facility]    RECONSTRUCT FOREFOOT WITH METATARSOPHALANGEAL (MTP) FUSION  6/10/2014    Procedure: RECONSTRUCT FOREFOOT WITH METATARSOPHALANGEAL (MTP) FUSION;  Surgeon: Ahs Gonzalez DPM;  Location: [facility]    STENT, CORONARY, DEMI      SURGICAL HISTORY OF -   1999/1974    lt knee    SURGICAL HISTORY OF -   10/2004    lithotripsy    SURGICAL HISTORY OF -   11/05    angiogram with stent    VASCULAR SURGERY  11/17/2005    Puncture of iliac artery  during and angiogram    [facility] LAPAROSCOPY, SURGICAL PROSTATECTOMY, RETROPUBIC RADICAL, W/NERVE SPARING  11/30/2004    With full bilateral pelvic lymphadenectomy.  F-[facility].    [facility] TOTAL KNEE ARTHROPLASTY  05/01/08    Left knee       Family History:    Family History   Problem Relation Age of Onset    Hypertension Father         Lived to age 87    Connective Tissue Disorder Mother         LUPUS    Heart Disease Mother         Valve replacement    Anxiety Disorder Mother         Lived to age 84    Dementia Mother         Nursing Home (lived to age 86)       Social History:  Marital Status:   [2]  Social History     Tobacco Use    Smoking status: Never    Smokeless tobacco: Never   Vaping Use    Vaping Use: Never used   Substance Use Topics    Alcohol use: Yes     Alcohol/week: 10.0 standard drinks of alcohol     Types: 10 Cans of beer per week     Comment: 1 hard  liquid and 1 wine or beer per day    Drug use: No        Medications:    amoxicillin (AMOXIL) 875 MG tablet  cetirizine (ZYRTEC) 10 MG tablet  citalopram (CELEXA) 40 MG tablet  DULoxetine (CYMBALTA) 60 MG capsule  famotidine (PEPCID) 20 MG tablet  levothyroxine (SYNTHROID/LEVOTHROID) 100 MCG tablet  metoprolol succinate ER (TOPROL XL) 50 MG 24 hr tablet  naloxone (NARCAN) 4 MG/0.1ML nasal spray  nitroGLYcerin (NITROSTAT) 0.4 MG sublingual tablet  omeprazole (PRILOSEC) 40 MG DR capsule  ondansetron (ZOFRAN ODT) 4 MG ODT tab  oxyCODONE (ROXICODONE) 5 MG tablet  pregabalin (LYRICA) 150 MG capsule  rosuvastatin (CRESTOR) 40 MG tablet  zolpidem (AMBIEN) 5 MG tablet          Review of Systems   Constitutional:  Positive for activity change and appetite change. Negative for fever.   HENT: Negative.     Eyes: Negative.    Respiratory: Negative.  Negative for cough and shortness of breath.    Cardiovascular:  Negative for chest pain.   Gastrointestinal:  Positive for abdominal pain (Periumbilical), nausea and vomiting (Nonbloody but states it looks like bile.).         His wife reports no bowel movement for last 5 days.   Genitourinary:  Positive for decreased urine volume.   Musculoskeletal:  Positive for myalgias.   Skin:  Negative for rash.   Neurological:  Positive for weakness (generalized).   Psychiatric/Behavioral: Negative.     All other systems reviewed and are negative.      Physical Exam   BP: 137/89  Pulse: 91  Temp: 97.9  F (36.6  C)  Resp: 18  Weight: 81.2 kg (179 lb)  SpO2: 99 %      Physical Exam  Vitals and nursing note reviewed. Exam conducted with a chaperone present (Spouse).   Constitutional:       General: He is in acute distress.      Appearance: He is ill-appearing. He is not diaphoretic.   HENT:      Head: Normocephalic and atraumatic.      Nose: No congestion.      Mouth/Throat:      Mouth: Mucous membranes are dry.      Pharynx: No oropharyngeal exudate.   Eyes:      General: No scleral icterus.     Conjunctiva/sclera: Conjunctivae normal.      Pupils: Pupils are equal, round, and reactive to light.   Cardiovascular:      Rate and Rhythm: Normal rate and regular rhythm.      Pulses: Normal pulses.   Pulmonary:      Effort: No respiratory distress.   Abdominal:      General: There is distension.      Palpations: There is no mass.      Tenderness: There is abdominal tenderness. There is no guarding or rebound.   Musculoskeletal:         General: No deformity or signs of injury.   Skin:     Capillary Refill: Capillary refill takes less than 2 seconds.      Coloration: Skin is pale. Skin is not jaundiced.      Findings: Bruising (buttock) present.   Neurological:      Mental Status: He is alert and oriented to person, place, and time.   Psychiatric:         Mood and Affect: Mood normal.         Behavior: Behavior normal.         ED Course                 Procedures              EKG Interpretation:      Interpreted by Quan Mohan DO  Time reviewed: 11:25PM  Symptoms at time of EKG: N/V, muscle weakness   Rhythm: normal sinus   Rate:  Normal  Axis: Normal  Ectopy: none  Conduction: normal  ST Segments/ T Waves: ST Segment Depression Global  Q Waves: none  Comparison to prior: 10/7/23 - bradycardia    Clinical Impression: Not significantly changed from 10/7/2023 except for the normalization of the heart rate        Critical Care time:  was 35 minutes for this patient excluding procedures.               Results for orders placed or performed during the hospital encounter of 10/14/23 (from the past 24 hour(s))   CBC with platelets differential    Narrative    The following orders were created for panel order CBC with platelets differential.  Procedure                               Abnormality         Status                     ---------                               -----------         ------                     CBC with platelets and d...[[id number]]  Abnormal            Final result                 Please view results for these tests on the individual orders.   Comprehensive metabolic panel   Result Value Ref Range    Sodium 138 135 - 145 mmol/L    Potassium <1.5 (LL) 3.4 - 5.3 mmol/L    Carbon Dioxide (CO2) 13 (L) 22 - 29 mmol/L    Anion Gap 15 7 - 15 mmol/L    Urea Nitrogen 11.0 8.0 - 23.0 mg/dL    Creatinine 1.98 (H) 0.67 - 1.17 mg/dL    GFR Estimate 35 (L) >60 mL/min/1.73m2    Calcium 10.3 (H) 8.8 - 10.2 mg/dL    Chloride 110 (H) 98 - 107 mmol/L    Glucose 112 (H) 70 - 99 mg/dL    Alkaline Phosphatase 301 (H) 40 - 129 U/L    AST 25 0 - 45 U/L    ALT 19 0 - 70 U/L    Protein Total 7.0 6.4 - 8.3 g/dL    Albumin 3.4 (L) 3.5 - 5.2 g/dL    Bilirubin Total 0.6 <=1.2 mg/dL   Lipase   Result Value Ref Range    Lipase 1,943 (H) 13 - 60 U/L   Procalcitonin   Result Value Ref Range    Procalcitonin 0.38 (H) <0.05 ng/mL   CRP inflammation   Result Value Ref Range    CRP Inflammation 15.45 (H) <5.00 mg/L   Blood gas venous   Result Value Ref Range    pH Venous 7.18 (LL) 7.32 - 7.43    pCO2 Venous 43 40 - 50 mm Hg    pO2 Venous 20 (L) 25 - 47 mm Hg     Bicarbonate Venous 16 (L) 21 - 28 mmol/L    Base Excess/Deficit -11.8 (L) -7.7 - 1.9 mmol/L    FIO2 21    CBC with platelets and differential   Result Value Ref Range    WBC Count 10.2 4.0 - 11.0 10e3/uL    RBC Count 4.76 4.40 - 5.90 10e6/uL    Hemoglobin 13.0 (L) 13.3 - 17.7 g/dL    Hematocrit 38.2 (L) 40.0 - 53.0 %    MCV 80 78 - 100 fL    MCH 27.3 26.5 - 33.0 pg    MCHC 34.0 31.5 - 36.5 g/dL    RDW 16.5 (H) 10.0 - 15.0 %    Platelet Count 246 150 - 450 10e3/uL    % Neutrophils 83 %    % Lymphocytes 7 %    % Monocytes 9 %    Mids % (Monos, Eos, Basos)      % Eosinophils 1 %    % Basophils 0 %    % Immature Granulocytes 0 %    NRBCs per 100 WBC 0 <1 /100    Absolute Neutrophils 8.5 (H) 1.6 - 8.3 10e3/uL    Absolute Lymphocytes 0.7 (L) 0.8 - 5.3 10e3/uL    Absolute Monocytes 0.9 0.0 - 1.3 10e3/uL    Mids Abs (Monos, Eos, Basos)      Absolute Eosinophils 0.1 0.0 - 0.7 10e3/uL    Absolute Basophils 0.0 0.0 - 0.2 10e3/uL    Absolute Immature Granulocytes 0.0 <=0.4 10e3/uL    Absolute NRBCs 0.0 10e3/uL   Magnesium   Result Value Ref Range    Magnesium 2.0 1.7 - 2.3 mg/dL   CK total   Result Value Ref Range    CK 53 39 - 308 U/L   CT Abdomen Pelvis w/o Contrast    Narrative    EXAM: CT ABDOMEN PELVIS W/O CONTRAST  LOCATION: [facility]  DATE: 10/14/2023    INDICATION: Severe abdominal pain with intractable nausea and vomiting.  History of recent liver ablation secondary to metastatic cancer lesion  COMPARISON: CT from 09/27/2023.  TECHNIQUE: CT scan of the abdomen and pelvis was performed without IV contrast. Multiplanar reformats were obtained. Dose reduction techniques were used.  CONTRAST: None.    FINDINGS:   LOWER CHEST: Bibasilar atelectasis.    HEPATOBILIARY: Again noted is a vague hypodense, centrally hyperdense lesion in the periphery of hepatic segment 8 abutting the liver capsule measuring roughly 7 x 6 cm on image 46, not significantly changed compared to prior study given  differences in   technique. There is some inflammatory change external to the liver capsule. There is also a more indistinct hypodense focus in segment 5 measuring roughly 3.2 x 3.0 cm on image 68, again similar to prior study given differences in contrast and   noncontrast enhanced technique. A few small gallstones. There is focal density in the region of the distal common bile duct near the ampulla as seen on image 87 of series 3 and image 43 of the coronal series. The common bile duct measures up to 10 mm on   coronal image 43 as well.    PANCREAS: Mild, diffuse peripancreatic edema.    SPLEEN: Normal.    ADRENAL GLANDS: Normal.    KIDNEYS/BLADDER: Small, nonobstructing bilateral intrarenal calculi. No hydronephrosis. Normal bladder.    BOWEL: Colonic diverticulosis. Normal caliber small bowel.    LYMPH NODES: Normal.    VASCULATURE: Mild aortoiliac atherosclerotic change. Right external iliac arterial stent in place though patency cannot be assessed given absence of contrast.    PELVIC ORGANS: Small fat-containing left inguinal hernia. Status post prostatectomy.    MUSCULOSKELETAL: Osteopenia with severe lumbar spine degenerative disc changes. Arthritic change of the hips.      Impression    IMPRESSION:   1.  Cholelithiasis with density in the region of the distal common bile duct near the ampulla likely to represent choledocholithiasis. Additionally, there is peripancreatic edema consistent with changes of pancreatitis. Recommend correlation with serum   lipase levels.    2.  No significant change in the evolving posttreatment findings of segment 5 and segment 8 liver lesions.    3.  Multiple nonobstructing bilateral renal calculi.    4.  Colonic diverticulosis.     UA with Microscopic reflex to Culture    Specimen: Urine, Midstream   Result Value Ref Range    Color Urine Straw Colorless, Straw, Light Yellow, Yellow    Appearance Urine Clear Clear    Glucose Urine Negative Negative mg/dL    Bilirubin Urine  Negative Negative    Ketones Urine Negative Negative mg/dL    Specific Gravity Urine 1.004 1.003 - 1.035    Blood Urine Small (A) Negative    pH Urine 8.0 (H) 5.0 - 7.0    Protein Albumin Urine 100 (A) Negative mg/dL    Urobilinogen Urine Normal Normal, 2.0 mg/dL    Nitrite Urine Negative Negative    Leukocyte Esterase Urine Negative Negative    RBC Urine <1 <=2 /HPF    WBC Urine 1 <=5 /HPF    Squamous Epithelials Urine <1 <=1 /HPF    Narrative    Urine Culture not indicated   Potassium   Result Value Ref Range    Potassium 2.8 (L) 3.4 - 5.3 mmol/L   US Abdomen Limited (RUQ)    Narrative    EXAM: US ABDOMEN LIMITED  LOCATION: [facility] [place]  DATE: 10/15/2023    INDICATION: Abdominal pain, intractable nausea vomiting, elevated lipase, abnormal CT scan with possible choledocholithiasis.  COMPARISON: CT abdomen and pelvis without IV contrast 10/14/2023.  TECHNIQUE: Limited abdominal ultrasound.    FINDINGS:    GALLBLADDER: Dependent stones and sludge. Mild wall thickening measuring 0.4 cm. No pericholecystic fluid or sonographic tenderness.    BILE DUCTS: No biliary dilatation. The common duct measures 6.0 mm. Tiny stones in the distal CBD seen on CT are not identifiable on ultrasound.    LIVER: Echogenic mass in the right hepatic lobe measures 2.1 x 2.0 cm. Larger zone of heterogeneity in the right hepatic lobe measuring 6.1 x 4.5 cm corresponding to larger lesion in the right hepatic lobe. Smooth hepatic contour. Patent portal vein with   normal directional flow.    RIGHT KIDNEY: No stones, masses or hydronephrosis. Right kidney measures 10.3 cm [id number].    PANCREAS: Obscured due to overlying bowel gas.    No ascites.      Impression    IMPRESSION:  1.  Distal common bile duct obscured due to overlying bowel gas, therefore, tiny stones noted on CT are not identifiable. Dependent stones and sludge in the gallbladder with mild wall thickening. No acute inflammatory signs.    2.   Heterogeneous masslike lesions in the right hepatic lobe corresponding to CT.    3.  Pancreas obscured due to overlying bowel gas.         *Note: Due to a large number of results and/or encounters for the requested time period, some results have not been displayed. A complete set of results can be found in Results Review.       Medications   ondansetron (ZOFRAN) injection 4 mg (4 mg Intravenous $Given 10/14/23 2158)   potassium chloride 10 mEq in 100 mL sterile water infusion (10 mEq Intravenous $New Bag 10/15/23 0122)   piperacillin-tazobactam (ZOSYN) 3.375 g vial to attach to  mL bag (has no administration in time range)   sodium chloride 0.9% BOLUS 1,000 mL (0 mLs Intravenous Stopped 10/14/23 8489)   prochlorperazine (COMPAZINE) injection 5 mg (5 mg Intravenous $Given 10/14/23 2315)       Assessments & Plan (with Medical Decision Making)  73-year-old male to the ER via ambulance from his home secondary concerns of increased weakness with persistent nausea and vomiting symptoms and inability to tolerate any oral intake for the last 5 days.  Patient was discharged from [facility] 5 days ago with similar symptoms.  It was determined at that visit that the patient did not need surgery for cholecystitis.  Patient appears to be in significant distress secondary to severe nausea and vomiting as well as periumbilical abdominal tenderness.  Is also reported that he has fallen 2-3 times earlier today secondary to muscle weakness.  His wife is to the point where she feels she cannot safely care for him anymore.  Patient with a history for squamous cell carcinoma with metastasis.  He is currently receiving active treatment for this condition at the [facility] in [place].  Patient with exam findings consistent with severe hypokalemia with a level of less than 1.5.  IV potassium replacement initiated as patient's not able to tolerate oral intake due to his severe nausea and vomiting symptoms.  CT scan  of his abdomen performed showed evidence for pancreatic inflammation and the patient was also found to have elevated lipase level.  His CT scan suggestive of likely choledocholithiasis.  Patient did not want to be transferred to another hospital but because of the likely need for MRCP and possibly ERCP given the CT scan findings and the biliary duct recommendation was made for transfer to a facility that can accommodate those imaging and procedures.  We do not have MRCP availability during the weekends.  I spoke to Dr. Agustin, hospitalist at [facility], who conditionally accepted the patient in transfer to their facility based on repeat potassium level and CK level test results.  Repeat potassium level was 2.8 and the CK level was 53.  Plan is to transfer the patient by ALS ambulance to [facility] for further evaluation and treatment.     I have reviewed the nursing notes.    I have reviewed the findings, diagnosis, plan and need for follow up with the patient.       Critical Care Addendum  My initial assessment, based on my review of prehospital provider report, review of nursing observations, review of vital signs, focused history, physical exam, review of cardiac rhythm monitor, and 12 lead ECG analysis, established a high suspicion that Quan Murphy has a high risk electrolyte abnormality, which requires immediate intervention, and therefore he is critically ill.     After the initial assessment, the care team initiated multiple lab tests, initiated IV fluid administration, and initiated medication therapy with potassium replacement therapy  to provide stabilization care. Due to the critical nature of this patient, I reassessed vital signs, review of cardiac rhythm monitor, mental status, neurologic status, and respiratory status multiple times prior to his disposition.     Time also spent performing documentation, discussion with family to obtain medical information for decision  making, reviewing test results, discussion with consultants, and coordination of care.     Critical care time (excluding teaching time and procedures): 35 minutes.         Medical Decision Making  The patient's presentation was of high complexity (a chronic illness severe exacerbation, progression, or side effect of treatment).    The patient's evaluation involved:  review of external note(s) from 1 sources (see separate area of note for details)  review of 1 test result(s) ordered prior to this encounter (see separate area of note for details)  ordering and/or review of 3+ test(s) in this encounter (see separate area of note for details)    The patient's management necessitated high risk (drug therapy requiring intensive monitoring (see separate area of note for details)) and high risk (a decision regarding hospitalization).        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

## 2023-10-15 NOTE — ED TRIAGE NOTES
Pt discharged from hospital 4 days ago.  Has not eaten, has nausea and weakness.         Triage Assessment (Adult)       Row Name 10/14/23 0027          Triage Assessment    Airway WDL WDL        Respiratory WDL    Respiratory WDL WDL

## 2023-10-15 NOTE — PROGRESS NOTES
Finished fourth bag of potassium upon arrival with [facility]; due for two more per previous orders at [facility].    Lab here checking STAT labs on admit.

## 2023-10-15 NOTE — CONSULTS
General Surgery Consultation    Quan Murphy MRN#: [id number]   Age: 73 year old YOB: 1950     The surgical service was consulted by Rashid Agustin MD to evaluate and/or treat this patient for gallstone pancreatitis.    Date of Admission:          10/15/2023       Assessment:   73 year old male with PMH of CAD and recent ablation of a liver lesion, who was recently admitted with concern for cholecystitis, however a HIDA scan was negative and the patient was discharged on 10/10. He  presented back to the hospital on 10/15/2023 with symptoms and signs consistent with gallstone pancreatitis. GI consulted and planning for ERCP.         Plan:   -- ERCP per GI  -- General surgery will plan for cholecystectomy during this admission following ERCP, timing to be determined   ___________________________________________________________________         Chief Complaint:   Abdominal pain       History of Present Illness:   73 year old male with PMH of CAD and recent ablation of a liver lesion, who was recently admitted with concern for cholecystitis, however a HIDA scan was negative and the patient was discharged on 10/10. He  presented back to the hospital on 10/15/2023 with abdominal pain, nausea, vomiting.Continues to have pain.          Past Medical History:     Past Medical History:   Diagnosis Date    Allergic rhinitis     Allergy, unspecified not elsewhere classified     Seasonal allergies, pollen, dust, smoke and animals    Antiplatelet or antithrombotic long-term use     Anxiety     Arthritis     Chest pain     Chronic sinusitis     Coronary atherosclerosis of unspecified type of vessel, native or graft     Coronary artery disease    Depressive disorder 1995    Gastroesophageal reflux disease 2020    Cleared with medication    Head injury 1954    Hiatal hernia 2015    Right Side    History of blood transfusion 12/15/2004    Prostate Surgery - My own blood    Hyperlipidemia     Hypertension     Inguinal  hernia     Kidney problem 10/08/2017    Lithotripsy    Kidney stones     Malignant neoplasm of prostate (H)     Prostate cancer    Prostate cancer (H)     Syncope     Tonsil cancer (H)           Past Surgical History:     Past Surgical History:   Procedure Laterality Date    ARTHRODESIS FOOT  7/23/2013    Procedure: ARTHRODESIS FOOT;  Great Toe Arthrodesis Left Foot;  Surgeon: Ash Gonzalez DPM;  Location: [facility] OR    ARTHRODESIS FOOT  6/10/2014    Procedure: ARTHRODESIS FOOT;  Surgeon: Ash Gonzalez DPM;  Location: [facility] OR    BIOPSY  10/1/2004    dermatologist biopsies    COLONOSCOPY  10/7/2013    Procedure: COLONOSCOPY;  Colonoscopy;  Surgeon: Mike Fallon MD;  Location: [facility] GI    CYSTOSCOPY N/A 2/16/2022    Procedure: CYSTOSCOPY and bladder stone removal;  Surgeon: David Rogers MD;  Location: [facility] OR    ESOPHAGOSCOPY, GASTROSCOPY, DUODENOSCOPY (EGD), COMBINED N/A 2/12/2020    Procedure: ESOPHAGOGASTRODUODENOSCOPY (EGD);  Surgeon: Sam Escobar MD;  Location: [facility] GI    EXTRACORPOREAL SHOCK WAVE LITHOTRIPSY (ESWL) Bilateral 10/18/2017    Procedure: EXTRACORPOREAL SHOCK WAVE LITHOTRIPSY (ESWL);  BILATERAL EXTRACORPOREAL SHOCKWAVE LITHOTRIPSY ;  Surgeon: Meir Torres MD;  Location: [facility] OR    HC CORRECT BUNION,SIMPLE  08/11/2005    x3    HC REMV TOE BENIGN BONE LESN  08/11/2005    HEAD & NECK SURGERY  1954    From a fall    HERNIORRHAPHY INGUINAL  7/3/2013    Procedure: HERNIORRHAPHY INGUINAL;  Open Repair Inguinal hernia Right with mesh ;  Surgeon: Sam sEcobar MD;  Location: [facility] OR    IR GASTROSTOMY TUBE PERCUTANEOUS PLCMNT  6/7/2021    MOHS MICROGRAPHIC PROCEDURE  08/23/11    ear and chin-[facility]    OPEN REDUCTION INTERNAL FIXATION WRIST Right 7/18/2017    Procedure: OPEN REDUCTION INTERNAL FIXATION WRIST;  Right distal radius open reduction and internal fixation;  Surgeon: Pedro Blanca DO;  Location: [facility] OR    RECONSTRUCT FOREFOOT WITH METATARSOPHALANGEAL  (MTP) FUSION  6/10/2014    Procedure: RECONSTRUCT FOREFOOT WITH METATARSOPHALANGEAL (MTP) FUSION;  Surgeon: Ash Gonzalez DPM;  Location: [facility] OR    STENT, CORONARY, DEMI      SURGICAL HISTORY OF -   1999/1974    lt knee    SURGICAL HISTORY OF -   10/2004    lithotripsy    SURGICAL HISTORY OF -   11/05    angiogram with stent    VASCULAR SURGERY  11/17/2005    Puncture of iliac artery during and angiogram    [facility] LAPAROSCOPY, SURGICAL PROSTATECTOMY, RETROPUBIC RADICAL, W/NERVE SPARING  11/30/2004    With full bilateral pelvic lymphadenectomy.  [facility].    [facility] TOTAL KNEE ARTHROPLASTY  05/01/08    Left knee            Medications:     Prior to Admission medications    Medication Sig Start Date End Date Taking? Authorizing Provider   amoxicillin (AMOXIL) 875 MG tablet Take 1 tablet (875 mg) by mouth 2 times daily for 10 days 10/10/23 10/20/23 Yes Ahmet Caraballo MD   cetirizine (ZYRTEC) 10 MG tablet Take 1 tablet (10 mg) by mouth daily 6/20/23  Yes Monico Rivers MD   citalopram (CELEXA) 40 MG tablet TAKE 1 TABLET BY MOUTH ONCE DAILY 9/20/23  Yes Monico Rivers MD   DULoxetine (CYMBALTA) 60 MG capsule Take 1 capsule (60 mg) by mouth daily 3/23/23  Yes Monico Rivers MD   famotidine (PEPCID) 20 MG tablet Take 20 mg by mouth 2 times daily as needed (heartburn)   Yes Reported, Patient   levothyroxine (SYNTHROID/LEVOTHROID) 100 MCG tablet Take 100 mcg by mouth daily 8/29/23  Yes Reported, Patient   metoprolol succinate ER (TOPROL XL) 50 MG 24 hr tablet Take 0.5 tablets (25 mg) by mouth daily 7/19/23  Yes Monico Rivers MD   naloxone (NARCAN) 4 MG/0.1ML nasal spray Spray 1 spray (4 mg) into one nostril alternating nostrils as needed for opioid reversal every 2-3 minutes until assistance arrives 3/9/23  Yes Monico Rivers MD   nitroGLYcerin (NITROSTAT) 0.4 MG sublingual tablet For chest pain place 1 tablet under the tongue every 5 minutes for 3 doses. If symptoms persist 5 minutes  after 1st dose call 911. 10/2/20  Yes Jose Hernandez MD   omeprazole (PRILOSEC) 40 MG DR capsule Take 1 capsule (40 mg) by mouth daily as needed (heartburn) 10/10/23  Yes Ahmet Caraballo MD   ondansetron (ZOFRAN ODT) 4 MG ODT tab DISSOLVE 1 TABLET (4 MG) BY MOUTH EVERY 8 HOURS AS NEEDED FOR NAUSEA 10/12/23  Yes Monico Rivers MD   oxyCODONE (ROXICODONE) 5 MG tablet TAKE 1-2 TABLETS (5-10 MG) BY MOUTH EVERY 6 HOURS AS NEEDED FOR SEVERE PAIN (LIMIT TO 6 TABLETS PER DAY) 10/3/23  Yes Monico Rivers MD   pregabalin (LYRICA) 150 MG capsule Take 1 capsule (150 mg) by mouth daily 10/10/23  Yes Ahmet Caraballo MD   rosuvastatin (CRESTOR) 40 MG tablet Take 1 tablet (40 mg) by mouth daily 11/15/22  Yes Aramis Ch MD   zolpidem (AMBIEN) 5 MG tablet TAKE 1 TABLET (5 MG) BY MOUTH NIGHTLY AS NEEDED FOR SLEEP 9/14/23  Yes Monico Rivers MD          Current Medications:          cetirizine  10 mg Oral Daily    citalopram  40 mg Oral Daily    DULoxetine  60 mg Oral Daily    levothyroxine  100 mcg Oral Daily    metoprolol succinate ER  25 mg Oral Daily    pantoprazole  40 mg Intravenous BID    piperacillin-tazobactam  3.375 g Intravenous Q6H    potassium chloride  20 mEq Oral Once    pregabalin  150 mg Oral Daily    sennosides  2 tablet Oral BID    sodium chloride (PF)  3 mL Intracatheter Q8H     acetaminophen **OR** acetaminophen, HYDROmorphone, HYDROmorphone, lidocaine 4%, lidocaine (buffered or not buffered), melatonin, naloxone **OR** naloxone **OR** naloxone **OR** naloxone, ondansetron **OR** ondansetron, senna-docusate **OR** senna-docusate, sodium chloride (PF)         Allergies:     Allergies   Allergen Reactions    Animal Dander     Azithromycin Nausea and Vomiting    Dust Mites     Pollen Extract     Smoke.           Social History:     Social History     Tobacco Use    Smoking status: Never    Smokeless tobacco: Never   Substance Use Topics    Alcohol use: Yes      Alcohol/week: 10.0 standard drinks of alcohol     Types: 10 Cans of beer per week     Comment: 1 hard  liquid and 1 wine or beer per day          Family History:     Family History   Problem Relation Age of Onset    Hypertension Father         Lived to age 87    Connective Tissue Disorder Mother         LUPUS    Heart Disease Mother         Valve replacement    Anxiety Disorder Mother         Lived to age 84    Dementia Mother         Nursing Home (lived to age 86)             Review of Systems:   The 12 point Review of Systems is negative other than noted in the HPI.         Physical Exam:   Blood pressure 131/81, pulse 67, temperature 97.6  F (36.4  C), temperature source Oral, resp. rate 18, SpO2 99%.  I/O last 3 completed shifts:  In: 2013 [P.O.:120; I.V.:1893]  Out: 1425 [Urine:1425]  General: male of stated age, no acute distress  HEENT: Normocephalic. Anecteric. Moist mucous membranes. Pupils equal  Neck: Supple without masses or lymphadenopathy  Lungs: Non-labored breathing  Heart: Regular rate & rhythm   Abdomen: Soft, mildly tender over all quadrants, mildly distended  Extremities: Moves all extremities. No edema  Neurologic: No focal deficits   Skin: Warm and dry         Data:   Labs:  Recent Labs   Lab Test 10/15/23  0504 10/14/23  2155 10/08/23  0720   WBC 6.0 10.2 9.7   HGB 11.6* 13.0* 12.0*   HCT 33.8* 38.2* 36.0*    246 301     Recent Labs   Lab Test 10/15/23  1228 10/15/23  0504 10/15/23  0117 10/14/23  2155 10/09/23  0409   POTASSIUM 2.1* 1.6* 2.8* <1.5* 3.7  3.6   CHLORIDE  --  115*  --  110* 116*   CO2  --  12*  --  13* 12*   BUN  --  11.2  --  11.0 16.7   CR  --  1.74*  --  1.98* 2.01*   GLC  --  101*  --  112* 79     Recent Labs   Lab Test 10/15/23  0504 10/14/23  2155 10/08/23  0720 10/07/23  0953 09/27/23  1245 09/27/23  1155 05/15/23  1342 05/08/23  1329   BILITOTAL 0.6 0.6 0.4 0.5 1.0  --    < > 0.4   DBIL  --   --   --  <0.20 0.42*  --   --  <0.20   ALT 18 19 32 43 47  --    < > 41    AST 23 25 35 49* 37  --    < > 33   ALKPHOS 248* 301* 231* 254* 238*  --    < > 63   LIPASE  --  1,943*  --  102*  --  64*  --   --     < > = values in this interval not displayed.     Recent Labs   Lab Test 10/15/23  0504 10/07/23  0953 09/27/23  1155 06/07/21  0953 10/28/17  1210   INR 1.14 1.28* 1.29*   < > 1.08   PTT  --  35 27  --  47*    < > = values in this interval not displayed.       CT scan of the abdomen:   IMPRESSION:   1.  Cholelithiasis with density in the region of the distal common bile duct near the ampulla likely to represent choledocholithiasis. Additionally, there is peripancreatic edema consistent with changes of pancreatitis. Recommend correlation with serum   lipase levels.     2.  No significant change in the evolving posttreatment findings of segment 5 and segment 8 liver lesions.     3.  Multiple nonobstructing bilateral renal calculi.     4.  Colonic diverticulosis.    Ultrasound of the abdomen:   IMPRESSION:  1.  Distal common bile duct obscured due to overlying bowel gas, therefore, tiny stones noted on CT are not identifiable. Dependent stones and sludge in the gallbladder with mild wall thickening. No acute inflammatory signs.     2.  Heterogeneous masslike lesions in the right hepatic lobe corresponding to CT.     3.  Pancreas obscured due to overlying bowel gas.    Thank you very much for this consult.     Time spent: 90 minutes total time spent on the date of this encounter doing: chart review, review of test results, patient visit/obtaining a history, physical exam, education, counseling, developing plan of care, and documenting.     I have discussed the history, physical, and plan with Dr Garcia, who has independently interviewed and examined the patient and agrees with the plan as stated.     Kalin Mendosa MD on 10/15/2023 at 3:56 PM  General Surgery Resident PGY4     Physician Attestation  I, Dhiraj Garcia, saw and evaluated this patient as part of a shared visit.  I have  reviewed and discussed with the advanced practice provider and/or resident their history, physical and plan.    I personally reviewed the vital signs, medications, labs and imaging.    My key history or physical exam findings: has abdominal pain; complicated encephalopathy and hypokalemia.    Clinical and radiologic appearance of cholecystitis and choledocholithiasis    Key management decisions made by me: lap jennifer after ERCP    Dhiraj Garcia MD  Date of Service (when I saw the patient): 10/15/23

## 2023-10-15 NOTE — PROVIDER NOTIFICATION
MD Notification    Notified Person: MD    Notified Person Name: Dr. Moore    Notification Date/Time: 1632    Notification Interaction: Vocera message     Purpose of Notification: Critical lab - potassium of 2.2    Orders Received: Continue to follow protocol, reduce IV potassium to 100mL/hr    Comments:

## 2023-10-16 NOTE — ANESTHESIA PREPROCEDURE EVALUATION
Anesthesia Pre-Procedure Evaluation    Patient: Quan Murphy   MRN: [id number] : 1950        Procedure : Procedure(s):  ENDOSCOPIC RETROGRADE CHOLANGIOPANCREATOGRAPHY, COMPLEX  Endoscopic ultrasound upper gastrointestinal tract (GI)          Past Medical History:   Diagnosis Date    Allergic rhinitis     Allergy, unspecified not elsewhere classified     Seasonal allergies, pollen, dust, smoke and animals    Antiplatelet or antithrombotic long-term use     Anxiety     Arthritis     Chest pain     Chronic sinusitis     Coronary atherosclerosis of unspecified type of vessel, native or graft     Coronary artery disease    Depressive disorder     Gastroesophageal reflux disease     Cleared with medication    Head injury     Hiatal hernia 2015    Right Side    History of blood transfusion 12/15/2004    Prostate Surgery - My own blood    Hyperlipidemia     Hypertension     Inguinal hernia     Kidney problem 10/08/2017    Lithotripsy    Kidney stones     Malignant neoplasm of prostate (H)     Prostate cancer    Prostate cancer (H)     Syncope     Tonsil cancer (H)       Past Surgical History:   Procedure Laterality Date    ARTHRODESIS FOOT  2013    Procedure: ARTHRODESIS FOOT;  Great Toe Arthrodesis Left Foot;  Surgeon: Ash Gonzalez DPM;  Location: [facility]    ARTHRODESIS FOOT  6/10/2014    Procedure: ARTHRODESIS FOOT;  Surgeon: Ash Gonzalez DPM;  Location: [facility]    BIOPSY  10/1/2004    dermatologist biopsies    COLONOSCOPY  10/7/2013    Procedure: COLONOSCOPY;  Colonoscopy;  Surgeon: Mike Fallon MD;  Location:  GI    CYSTOSCOPY N/A 2022    Procedure: CYSTOSCOPY and bladder stone removal;  Surgeon: David Rogers MD;  Location: [facility]    ESOPHAGOSCOPY, GASTROSCOPY, DUODENOSCOPY (EGD), COMBINED N/A 2020    Procedure: ESOPHAGOGASTRODUODENOSCOPY (EGD);  Surgeon: Sam Escobar MD;  Location:  GI    EXTRACORPOREAL SHOCK WAVE LITHOTRIPSY (ESWL) Bilateral  10/18/2017    Procedure: EXTRACORPOREAL SHOCK WAVE LITHOTRIPSY (ESWL);  BILATERAL EXTRACORPOREAL SHOCKWAVE LITHOTRIPSY ;  Surgeon: Meir Torres MD;  Location: [facility]    HC CORRECT BUNION,SIMPLE  08/11/2005    x3    HC REMV TOE BENIGN BONE LESN  08/11/2005    HEAD & NECK SURGERY  1954    From a fall    HERNIORRHAPHY INGUINAL  7/3/2013    Procedure: HERNIORRHAPHY INGUINAL;  Open Repair Inguinal hernia Right with mesh ;  Surgeon: Sam Escobar MD;  Location: [facility]    IR GASTROSTOMY TUBE PERCUTANEOUS PLCMNT  6/7/2021    MOHS MICROGRAPHIC PROCEDURE  08/23/11    ear and chin-[facility]    OPEN REDUCTION INTERNAL FIXATION WRIST Right 7/18/2017    Procedure: OPEN REDUCTION INTERNAL FIXATION WRIST;  Right distal radius open reduction and internal fixation;  Surgeon: Pedro Blanca DO;  Location: [facility]    RECONSTRUCT FOREFOOT WITH METATARSOPHALANGEAL (MTP) FUSION  6/10/2014    Procedure: RECONSTRUCT FOREFOOT WITH METATARSOPHALANGEAL (MTP) FUSION;  Surgeon: Ash Gonzalez DPM;  Location: [facility]    STENT, CORONARY, DEMI      SURGICAL HISTORY OF -   1999/1974    lt knee    SURGICAL HISTORY OF -   10/2004    lithotripsy    SURGICAL HISTORY OF -   11/05    angiogram with stent    VASCULAR SURGERY  11/17/2005    Puncture of iliac artery during and angiogram    [facility] LAPAROSCOPY, SURGICAL PROSTATECTOMY, RETROPUBIC RADICAL, W/NERVE SPARING  11/30/2004    With full bilateral pelvic lymphadenectomy.  [facility].    [facility] TOTAL KNEE ARTHROPLASTY  05/01/08    Left knee      Allergies   Allergen Reactions    Animal Dander     Azithromycin Nausea and Vomiting    Dust Mites     Pollen Extract     Smoke.       Social History     Tobacco Use    Smoking status: Never    Smokeless tobacco: Never   Substance Use Topics    Alcohol use: Yes     Alcohol/week: 10.0 standard drinks of alcohol     Types: 10 Cans of beer per week     Comment: 1 hard  liquid and 1 wine or beer per day      Wt Readings from Last 1  Encounters:   10/14/23 81.2 kg (179 lb)        Anesthesia Evaluation   Pt has had prior anesthetic.     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 The left ventricle is normal in structure, function and size. The visual  ejection fraction is estimated at 60%. Global peak LV longitudinal strain is  averaged at -19%. This is within reported normal limits (normal <-18%).  2. The right ventricle is normal in structure, function and size.  3. No valve disease.     Echo 10/2022 showed EF 55%, strain -19%.  ______________________________________________________________________________  Left Ventricle  The left ventricle is normal in structure, function and size. There is normal  left ventricular wall thickness. The visual ejection fraction is estimated at  60%. Global peak LV longitudinal strain is averaged at -19%. This is within  reported normal limits (normal <-18%). Left ventricular diastolic function is  normal. Normal left ventricular wall motion.     Right Ventricle  The right ventricle is normal in structure, function and size.     Atria  Normal left atrial size. Right atrial size is normal. There is no atrial shunt  seen.     Mitral Valve  There is mild (1+) mitral regurgitation.     Tricuspid Valve  There is mild (1+) tricuspid regurgitation. The right ventricular systolic  pressure is approximated at 24.9 mmHg plus the right atrial pressure.     Aortic Valve  There is mild (1+) aortic regurgitation.     Pulmonic Valve  The pulmonic valve is normal in structure and function.     Vessels  Normal ascending, transverse (arch), and descending aorta. The  inferior vena  cava was normal in size with preserved respiratory variability.     Pericardium  There is no pericardial effusion.     Rhythm  Sinus rhythm was noted.    3/2022 stress test  Result Text        The nuclear stress test is probably negative for inducible myocardial ischemia or infarction. (there is a fixed defect in inferoseptal wall with normal wall motion likely diaphragm attenuation     Left ventricular function is normal.     The left ventricular ejection fraction at stress is 61%.     There is no prior study for comparison.      (+)  hypertension- -  CAD -  - -   Taking blood thinners          fainting (syncope).                         METS/Exercise Tolerance:     Hematologic:     (+)      anemia,          Musculoskeletal:       GI/Hepatic: Comment: Choledocholithiasis  Gallstone pancreatitis      (+) GERD,     hiatal hernia,              Renal/Genitourinary: Comment: Severe hypokalemia on admission    Metabolic acidosis with bicarb of 9    (+) renal disease, type: CRI,     Nephrolithiasis ,       Endo:     (+)          thyroid problem,            Psychiatric/Substance Use:       Infectious Disease: Comment: Enterococcal bacteremia prior to admission      Malignancy:       Other:            Physical Exam    Airway  airway exam normal    Comment: Small mouth.    Mallampati: III   TM distance: > 3 FB   Neck ROM: full   Mouth opening: > 3 cm    Respiratory Devices and Support         Dental       (+) Minor Abnormalities - some fillings, tiny chips      Cardiovascular   cardiovascular exam normal          Pulmonary   pulmonary exam normal                OUTSIDE LABS:  CBC:   Lab Results   Component Value Date    WBC 5.9 10/16/2023    WBC 6.0 10/15/2023    HGB 9.6 (L) 10/16/2023    HGB 11.6 (L) 10/15/2023    HCT 28.4 (L) 10/16/2023    HCT 33.8 (L) 10/15/2023     10/16/2023     10/15/2023     BMP:   Lab Results   Component Value Date     10/16/2023     10/15/2023    POTASSIUM  3.6 10/16/2023    POTASSIUM 3.6 10/16/2023    CHLORIDE 127 (H) 10/16/2023    CHLORIDE 115 (H) 10/15/2023    CO2 9 (LL) 10/16/2023    CO2 12 (L) 10/15/2023    BUN 14.5 10/16/2023    BUN 11.2 10/15/2023    CR 1.60 (H) 10/16/2023    CR 1.74 (H) 10/15/2023    GLC 73 10/16/2023     (H) 10/15/2023     COAGS:   Lab Results   Component Value Date    PTT 35 10/07/2023    INR 1.14 10/15/2023     POC:   Lab Results   Component Value Date    BGM 85 06/06/2021     HEPATIC:   Lab Results   Component Value Date    ALBUMIN 2.4 (L) 10/16/2023    PROTTOTAL 5.3 (L) 10/16/2023    ALT 14 10/16/2023    AST 19 10/16/2023    ALKPHOS 194 (H) 10/16/2023    BILITOTAL 0.6 10/16/2023     OTHER:   Lab Results   Component Value Date    PH 7.52 (H) 06/04/2021    LACT 0.7 10/16/2023    LATRELL 9.5 10/16/2023    PHOS 3.3 10/07/2023    MAG 2.0 10/15/2023    LIPASE 1,943 (H) 10/14/2023    TSH 0.06 (L) 08/08/2023    T4 1.70 08/08/2023    T3 36 (L) 12/12/2022    CRP 13.8 (H) 12/09/2022    SED 8 02/11/2020       Anesthesia Plan    ASA Status:  3    NPO Status:  NPO Appropriate    Anesthesia Type: General.     - Airway: ETT   Induction: Intravenous, Propofol.   Maintenance: Balanced.   Techniques and Equipment:     - Airway: Video-Laryngoscope       Consents    Anesthesia Plan(s) and associated risks, benefits, and realistic alternatives discussed. Questions answered and patient/representative(s) expressed understanding.     - Discussed:     - Discussed with:  Patient            Postoperative Care    Pain management: IV analgesics, Multi-modal analgesia.   PONV prophylaxis: Ondansetron (or other 5HT-3)     Comments:                David Guerra MD

## 2023-10-16 NOTE — PROVIDER NOTIFICATION
MD Notification    Notified Person: MD    Notified Person Name: Dr. Tipton     Notification Date/Time: 10-16 0700am     Notification Interaction: [employer]era messaging     Purpose of Notification: [address] Quan Murphy had a critical lab - CO2 is 9.    Orders Received: Please page the daytime hospitalist to address     Comments:

## 2023-10-16 NOTE — CONSULTS
[facility]  Gastroenterology Consultation         Quan Murphy  [address]  [facility] [address]  73 year old male    Admission Date/Time: 10/15/2023  Primary Care Provider: Monico Rivers  Referring / Attending Physician:  Dr. Rashid Agustin    We were asked to see the patient in consultation by Dr. Rashid Agustin for evaluation of gallstone pancreatitis.      CC: Abdominal pain    HPI:  Quan Murphy is a 73 year old male with a past medical history of GERD, CAD not on anticoagulation whom was diagnosed with acute cholecystitis on September 27 for which the patient chose to leave the hospital AMA presents back to the hospital with nausea, vomiting, abdominal pain for 5 days.  Since leaving has had decreased appetite, pain with eating, intermittent fever with Tmax of 104  F at home associated with chills.      On presentation had elevated lipase, abdominal ultrasound revealed a likely distal common bile duct stone gallstone as well as pancreatitis, also noted heterogeneous mass like lesions in the right hepatic lobe corresponding to CT  The patient was transferred to [facility] for further management and likely ERCP.     ROS: A comprehensive ten point review of systems was negative aside from those in mentioned in the HPI.      PAST MED HX:  I have reviewed this patient's medical history and updated it with pertinent information if needed.   Past Medical History:   Diagnosis Date    Allergic rhinitis     Allergy, unspecified not elsewhere classified     Seasonal allergies, pollen, dust, smoke and animals    Antiplatelet or antithrombotic long-term use     Anxiety     Arthritis     Chest pain     Chronic sinusitis     Coronary atherosclerosis of unspecified type of vessel, native or graft     Coronary artery disease    Depressive disorder 1995    Gastroesophageal reflux disease 2020    Cleared with medication    Head injury 1954    Hiatal hernia 2015    Right Side     History of blood transfusion 12/15/2004    Prostate Surgery - My own blood    Hyperlipidemia     Hypertension     Inguinal hernia     Kidney problem 10/08/2017    Lithotripsy    Kidney stones     Malignant neoplasm of prostate (H)     Prostate cancer    Prostate cancer (H)     Syncope     Tonsil cancer (H)        MEDICATIONS:   Prior to Admission Medications   Prescriptions Last Dose Informant Patient Reported? Taking?   DULoxetine (CYMBALTA) 60 MG capsule   No Yes   Sig: Take 1 capsule (60 mg) by mouth daily   amoxicillin (AMOXIL) 875 MG tablet   No Yes   Sig: Take 1 tablet (875 mg) by mouth 2 times daily for 10 days   cetirizine (ZYRTEC) 10 MG tablet   No Yes   Sig: Take 1 tablet (10 mg) by mouth daily   citalopram (CELEXA) 40 MG tablet   No Yes   Sig: TAKE 1 TABLET BY MOUTH ONCE DAILY   famotidine (PEPCID) 20 MG tablet   Yes Yes   Sig: Take 20 mg by mouth 2 times daily as needed (heartburn)   levothyroxine (SYNTHROID/LEVOTHROID) 100 MCG tablet   Yes Yes   Sig: Take 100 mcg by mouth daily   metoprolol succinate ER (TOPROL XL) 50 MG 24 hr tablet   No Yes   Sig: Take 0.5 tablets (25 mg) by mouth daily   naloxone (NARCAN) 4 MG/0.1ML nasal spray  at prn  No Yes   Sig: Spray 1 spray (4 mg) into one nostril alternating nostrils as needed for opioid reversal every 2-3 minutes until assistance arrives   nitroGLYcerin (NITROSTAT) 0.4 MG sublingual tablet  at prn  No Yes   Sig: For chest pain place 1 tablet under the tongue every 5 minutes for 3 doses. If symptoms persist 5 minutes after 1st dose call 911.   omeprazole (PRILOSEC) 40 MG DR capsule   No Yes   Sig: Take 1 capsule (40 mg) by mouth daily as needed (heartburn)   ondansetron (ZOFRAN ODT) 4 MG ODT tab   No Yes   Sig: DISSOLVE 1 TABLET (4 MG) BY MOUTH EVERY 8 HOURS AS NEEDED FOR NAUSEA   oxyCODONE (ROXICODONE) 5 MG tablet 10/14/2023 at am  No Yes   Sig: TAKE 1-2 TABLETS (5-10 MG) BY MOUTH EVERY 6 HOURS AS NEEDED FOR SEVERE PAIN (LIMIT TO 6 TABLETS PER DAY)    pregabalin (LYRICA) 150 MG capsule   No Yes   Sig: Take 1 capsule (150 mg) by mouth daily   rosuvastatin (CRESTOR) 40 MG tablet   No Yes   Sig: Take 1 tablet (40 mg) by mouth daily   zolpidem (AMBIEN) 5 MG tablet   No Yes   Sig: TAKE 1 TABLET (5 MG) BY MOUTH NIGHTLY AS NEEDED FOR SLEEP      Facility-Administered Medications: None       ALLERGIES:   Allergies   Allergen Reactions    Animal Dander     Azithromycin Nausea and Vomiting    Dust Mites     Pollen Extract     Smoke.        SOCIAL HISTORY:  Social History     Tobacco Use    Smoking status: Never    Smokeless tobacco: Never   Vaping Use    Vaping Use: Never used   Substance Use Topics    Alcohol use: Yes     Alcohol/week: 10.0 standard drinks of alcohol     Types: 10 Cans of beer per week     Comment: 1 hard  liquid and 1 wine or beer per day    Drug use: No       FAMILY HISTORY:  Family History   Problem Relation Age of Onset    Hypertension Father         Lived to age 87    Connective Tissue Disorder Mother         LUPUS    Heart Disease Mother         Valve replacement    Anxiety Disorder Mother         Lived to age 84    Dementia Mother         Nursing Home (lived to age 86)       PHYSICAL EXAM:   General  alert, oriented and comfortable  Vital Signs with Ranges  Temp: 98.3  F (36.8  C) Temp src: Axillary BP: 135/82 Pulse: 68   Resp: 18 SpO2: 99 % O2 Device: None (Room air)    I/O last 3 completed shifts:  In: 1890 [I.V.:1890]  Out: 1425 [Urine:1425]    Constitutional: healthy, alert, and no distress   Cardiovascular: negative, PMI normal. No lifts, heaves, or thrills. RRR. No murmurs, clicks gallops or rub  Respiratory: negative, Percussion normal. Good diaphragmatic excursion. Lungs clear  Abdomen: Abdomen soft, RUQ tenderness. BS normal. No masses, organomegaly          ADDITIONAL COMMENTS:   I reviewed the patient's new clinical lab test results.   Recent Labs   Lab Test 10/16/23  0622 10/15/23  0504 10/14/23  2155 10/08/23  0720 10/07/23  3568  09/27/23  1155   WBC 5.9 6.0 10.2   < > 7.4 8.0   HGB 9.6* 11.6* 13.0*   < > 12.6* 12.8*   MCV 81 81 80   < > 81 84    222 246   < > 298 225   INR  --  1.14  --   --  1.28* 1.29*    < > = values in this interval not displayed.     Recent Labs   Lab Test 10/16/23  0622 10/16/23  0045 10/15/23  2046 10/15/23  1228 10/15/23  0504 10/15/23  0117 10/14/23  2155   POTASSIUM 3.6  3.6 3.2* 2.5*   < > 1.6*   < > <1.5*   CHLORIDE 127*  --   --   --  115*  --  110*   CO2 9*  --   --   --  12*  --  13*   BUN 14.5  --   --   --  11.2  --  11.0   ANIONGAP 9  --   --   --  13  --  15    < > = values in this interval not displayed.     Recent Labs   Lab Test 10/16/23  0622 10/15/23  0504 10/14/23  2316 10/14/23  2155 10/08/23  0720 10/07/23  1328 10/07/23  0953 09/27/23  1329 09/27/23  1245 09/27/23  1155   ALBUMIN 2.4* 2.9*  --  3.4*   < >  --  3.2*  --    < >  --    BILITOTAL 0.6 0.6  --  0.6   < >  --  0.5  --    < >  --    ALT 14 18  --  19   < >  --  43  --    < >  --    AST 19 23  --  25   < >  --  49*  --    < >  --    PROTEIN  --   --  100*  --   --  100*  --  30*  --   --    LIPASE  --   --   --  1,943*  --   --  102*  --   --  64*    < > = values in this interval not displayed.       I reviewed the patient's new imaging results.        CONSULTATION ASSESSMENT AND PLAN:    Quan Murphy is a 73 year old male admitted to [facility] on October 15 for gallstone pancreatitis.     Gallstone pancreatitis  Choledocholithiasis  Cholelithiasis  5 day history n/v and abdominal pain  . Lipase 1943  CT scan revealed cholelithiasis with a density in the region of the distal common bile duct near the ampulla most likely representing choledocholithiasis.  Peripancreatic edema was also seen consistent with pancreatitis.    Suspect gallstone pancreatitis and choledocholithiasis  Recommend ERCP today with stone removal    - daily labs  - NPO for ERCP today  - continue IVF fluids, prn pain control and antiemetics  -  appreciate surgery consult and plan cholecystectomy this admission    Liver lesion  Abdominal ultrasound noted mass like lesions in the right hepatic lobe corresponding to CT  EUS scheduled can further investigate  May be due to inflammatory effect from above     - repeat imaging in 6 months      [facility].  Office: [phone]  Cell : [phone] (Dr. Blood)  Cell: [phone] (Sona Issa PA-C)

## 2023-10-16 NOTE — PROVIDER NOTIFICATION
MD Notification    Notified Person: MD    Notified Person Name: Dr. Tipton    Notification Date/Time: 10/16/23 0508    Notification Interaction: [employer] messaging    Purpose of Notification: [address] Murphy is nauseous. Gave zofran already. Can I get an order for compazine? Thanks.    Orders Received: Yes    Comments:

## 2023-10-16 NOTE — PLAN OF CARE
Alert and oriented x4. Lethargic at times. VSS on RA. Denies chest pain and SOB. Abdominal pain managed with prn iv Dilaudid and zofran for nausea. Had ERCP. Denies pain and nausea after ERCP procedure. Tolerating clear liquid diet. Denies chest pain and SOB. Tele NSR. Up Ax1 walker+GB. Voiding per urinal. Incontinent of loose stool. Senna discontinued. Sodium bicarbonate with dextrose infusing 75ml/hr. NPO at midnight. Plan is lap jennifer tomorrow around noon.

## 2023-10-16 NOTE — PROVIDER NOTIFICATION
MD Notification    Notified Person: MD    Notified Person Name: Dr. Moore    Notification Date/Time: 10-16 700    Notification Interaction: [employer] messaging     Purpose of Notification:   [address] Quan Murphy had a critical CO2 of 9    Orders Received: Added D5 to his iVF     Comments:

## 2023-10-16 NOTE — ANESTHESIA POSTPROCEDURE EVALUATION
Patient: Quan Murphy    Procedure: Procedure(s):  ENDOSCOPIC RETROGRADE CHOLANGIOPANCREATOGRAPHY, COMPLEX  Endoscopic ultrasound upper gastrointestinal tract (GI)       Anesthesia Type:  General    Note:  Disposition: Inpatient   Postop Pain Control: Uneventful            Sign Out: Well controlled pain   PONV: No   Neuro/Psych: Uneventful            Sign Out: Acceptable/Baseline neuro status   Airway/Respiratory: Uneventful            Sign Out: Acceptable/Baseline resp. status   CV/Hemodynamics: Uneventful            Sign Out: Acceptable CV status; No obvious hypovolemia; No obvious fluid overload   Other NRE: NONE   DID A NON-ROUTINE EVENT OCCUR? No           Last vitals:  Vitals Value Taken Time   /83 10/16/23 1516   Temp 36.3  C (97.4  F) 10/16/23 1430   Pulse 61 10/16/23 1516   Resp 20 10/16/23 1516   SpO2 98 % 10/16/23 1516       Electronically Signed By: David Guerra MD  October 16, 2023  4:15 PM

## 2023-10-16 NOTE — PROGRESS NOTES
[facility]    Medicine Progress Note - Hospitalist Service    Date of Admission:  10/15/2023    Assessment & Plan   Quan Murphy is a 73 year old male admitted to [facility] on October 15 for gallstone pancreatitis. Pt was recently hospitalized from 10/7-10/10/23 . Quan Murphy is a 73 year old male with metastatic squamous cell carcinoma of tonsils, s/p chemoradiation, recent ablation of liver metastasis on 9/19/2023, CAD, hypertension, hyperlipidemia  He presented to [facility] ER on 9/27 with fever and abdominal pain.  Ultrasound showed acute cholecystitis with cholelithiasis.  He declined admission and left AMA.  Blood culture grew Enterococcus hirae sensitive to ampicillin.  He did not receive antibiotics for this.  Multiple attempts were made to contact the patient but were unsuccessful.  His abdominal pain subsequently resolved.  He presented to [facility] ER again on 10/7 with 1 week of diarrhea, nausea, anorexia, poor oral intake 10 pound weight loss in 1 week generalized weakness, dizziness and recurrent falls.    Work-up showed severe hypokalemia with potassium of 2.2 acute kidney injury with creatinine of 2.5.  There was concern that his symptoms are likely related to acute cholecystitis with cholelithiasis.  He was felt to be a high risk patient and was thus transferred to [facility] for general surgery evaluation. Consulted general surgery. HIDA scan was Negative for acute cholecystitis and biliary dyskinesia. No surgery recommended. Symptomatic treatment.Patient presented with ongoing abdominal pain, N/V and unable to eat due to pain and was only drinking sprite PTA       Choledocholithiasis  Gallstone pancreatitis  Acute metabolic encephalopathy  Patient presented to the emergency room with a 5-day history of nausea, vomiting, abdominal pain.  Blood work was significant for an ALP elevation and lipase elevation.  CT scan revealed cholelithiasis  with a density in the region of the distal common bile duct near the ampulla most likely representing choledocholithiasis.  Peripancreatic edema was also seen consistent with pancreatitis.  The patient has been having ongoing issues with acute cholecystitis and cholelithiasis since September and would benefit from cholecystectomy while inpatient.  Patient was not able to tolerate diet since studies of discharge and was only drinking Sprite as per wife at bedside  On IV PPI BID   -N.p.o.  -IV Zosyn  -IV fluids  -Follow-up GI consult with Dr. Blood and GEN surge consult   planning on EUS and  ERCP today     Enterococcal bacteremia prior to admission          1/2 bottles positive on 9/27 at OSH- patient refused treatment and left.   Repeat blood cultures remain negative   He was treated with Unasyn and discharged on Augmentin.  Blood cultures repeated today   On IV zosyn now     Severe hypokalemia at 1.6 improved   Likely due to GI losses from his vomiting.  Asymptomatic with generalized weakness and EKG changes.  Potassium has significantly improved with replacement and last check was 2.8 prior to transfer; however I believe that this is an erroneous lab value as the patient had only received 30 mill equivalents of potassium prior to rechecking.  -Follow-up repeat chemistry  -Potassium replacement protocol  -[facility] for close potassium monitoring.    Replace and recheck with close monitoring  On scheduled potassium at 40meq PO daily     Metabolic acidosis with bicarb of 9:  Lactte normal  Ketones normal   Started on bicarb in IVF   Follow BMP closely     SURESH  Creatinine was 1.09 on September 27, admission creatinine is 1.98.  Presumably hypovolemic secondary to his nausea, and vomiting.  -IV hydration  -Monitor renal function  -Avoid nephrotoxic medications  Cretinine at 1.6 currently     Metastatic squamous cell carcinoma of the tonsils status post chemoradiation  Mets to the liver status post ablation on  "September 19, 2023  -Follows at the [facility]  .  Coronary artery disease  Hypertension  Dyslipidemia  Chest pain-free on exam.  EKG is abnormal with changes consistent of hypokalemia as mentioned above.  -Resume PTA metoprolol and Crestor once med rec is complete                Diet: NPO for Medical/Clinical Reasons Except for: Meds, Ice Chips    DVT Prophylaxis: Pneumatic Compression Devices  Stone Catheter: Not present  Lines: None     Cardiac Monitoring: ACTIVE order. Indication: Electrolyte Imbalance (24 hours)- Magnesium <1.3 mg/ml; Potassium < =2.8 or > 5.5 mg/ml  Code Status: Full Code      Clinically Significant Risk Factors        # Hypokalemia: Lowest K = 1.35 mmol/L in last 2 days, will replace as needed    # Hypercalcemia: Highest Ca = 10.3 mg/dL in last 2 days, will monitor as appropriate    # Hypoalbuminemia: Lowest albumin = 2.4 g/dL at 10/16/2023  6:22 AM, will monitor as appropriate       # Hypertension: Noted on problem list        # Overweight: Estimated body mass index is 27.22 kg/m  as calculated from the following:    Height as of 10/7/23: 1.727 m (5' 8\").    Weight as of 10/14/23: 81.2 kg (179 lb)., PRESENT ON ADMISSION            Disposition Plan      Expected Discharge Date: 10/18/2023                    Rosalinda Moore MD  Hospitalist Service  [facility]  Securely message with [employer] (more info)  Text page via Capt[employer] Paging/Directory   ______________________________________________________________________    Interval History   Patient is resting comfortably in bed.  C/o intermittent abdominal pain. Nausea improved with zofran. Going down for EUS and ERCP per GI today   Physical Exam   Vital Signs: Temp: 98.2  F (36.8  C) Temp src: Axillary BP: 128/74 Pulse: 63   Resp: 18 SpO2: 97 % O2 Device: None (Room air)    Weight: 0 lbs 0 oz    General Appearance: Alert awake, not in acute distress  Respiratory: Clear to auscultation bilaterally, diminished in the " bases  Cardiovascular: Normal rate rhythm regular  GI: Right upper quadrant and epigastric tenderness present, abdomen nondistended soft, bowel sounds positive no guarding rigidity or rebound noted  Skin: Warm and dry  Psychiatric mood and affect are normal  Neurological; able to move all extremities    Medical Decision Making       55 MINUTES SPENT BY ME on the date of service doing chart review, history, exam, documentation & further activities per the note.      Data     I have personally reviewed the following data over the past 24 hrs:    5.9  \   9.6 (L)   / 193     145 127 (H) 14.5 /  73   3.6; 3.6 9 (LL) 1.60 (H) \     ALT: 14 AST: 19 AP: 194 (H) TBILI: 0.6   ALB: 2.4 (L) TOT PROTEIN: 5.3 (L) LIPASE: N/A     Procal: N/A CRP: N/A Lactic Acid: 0.7         Imaging results reviewed over the past 24 hrs:   No results found for this or any previous visit (from the past 24 hour(s)).

## 2023-10-16 NOTE — PLAN OF CARE
Goal Outcome Evaluation:       Orientation: A&O x4   Aggression Stop Light: green  Mobility: A1 GBW   Tele: NSR  Pain Management: abdominal pain managed with with IV dilaudid 0.2 mg. Patient reported relief.   Diet: NPO   Bowel/Bladder: urinal at bedside. 1 loose BM this shift. Incontinent of bowel.   Abnormal Lab/Assessments: Potassium 3.2, replaced once this shift. AM recheck. Patient reported nausea. Treated with zofran and compazine- patient reported relief following compazine. Blanchable redness to coccyx- Mepilex CDI  Drain/Device/Wound: N/A  Consults:   D/C Day/Goals/Place: pending     Shift Note: Patient scheduled for ERCP and cholecystectomy today.

## 2023-10-16 NOTE — ANESTHESIA CARE TRANSFER NOTE
Patient: Quan Murphy    Procedure: Procedure(s):  ENDOSCOPIC RETROGRADE CHOLANGIOPANCREATOGRAPHY, COMPLEX  Endoscopic ultrasound upper gastrointestinal tract (GI)       Diagnosis: Gallstone pancreatitis [K85.10]  Choledocholithiasis [K80.50]  Diagnosis Additional Information: No value filed.    Anesthesia Type:   General     Note:    Oropharynx: oropharynx clear of all foreign objects and spontaneously breathing  Level of Consciousness: drowsy  Oxygen Supplementation: face mask  Level of Supplemental Oxygen (L/min / FiO2): 6  Independent Airway: airway patency satisfactory and stable  Dentition: dentition unchanged  Vital Signs Stable: post-procedure vital signs reviewed and stable  Report to RN Given: handoff report given  Patient transferred to: PACU  Comments: Neuromuscular blockade not used after succinylcholine for intubation, spontaneous return of TOF 4/4 with sustained tetany, spontaneous respirations, adequate tidal volumes, followed commands to voice, oropharynx suctioned with soft flexible catheter, extubated atraumatically, extubated with suction, airway patent after extubation.  Oxygen via facemask at 6 liters per minute to PACU. Oxygen tubing connected to wall O2 in PACU, SpO2, NiBP, and EKG monitors and alarms on and functioning, Sarah Hugger warmer connected to patient gown, report on patient's clinical status given to PACU RN, RN questions answered. PACU RN told of patient's extreme somnolence pre-operatively, and instructed that if seen, this behavior is not new post-anesthetic.         Handoff Report: Identifed the Patient, Identified the Reponsible Provider, Reviewed the pertinent medical history, Discussed the surgical course, Reviewed Intra-OP anesthesia mangement and issues during anesthesia, Set expectations for post-procedure period and Allowed opportunity for questions and acknowledgement of understanding      Vitals:  Vitals Value Taken Time   /75 10/16/23 1409   Temp     Pulse  70 10/16/23 1410   Resp 16 10/16/23 1410   SpO2 100 % 10/16/23 1410   Vitals shown include unfiled device data.    Electronically Signed By: Charline Ware  October 16, 2023  2:12 PM

## 2023-10-16 NOTE — ANESTHESIA PROCEDURE NOTES
Airway       Patient location during procedure: OR       Procedure Start/Stop Times: 10/16/2023 1:34 PM  Staff -        Anesthesiologist:  David Guerra MD       CRNA: Charline Ware       Performed By: CRNA  Consent for Airway        Urgency: elective  Indications and Patient Condition       Indications for airway management: mago-procedural       Induction type:intravenous       Mask difficulty assessment: 1 - vent by mask    Final Airway Details       Final airway type: endotracheal airway       Successful airway: ETT - single and Oral  Endotracheal Airway Details        ETT size (mm): 8.0       Cuffed: yes       Successful intubation technique: video laryngoscopy       VL Blade Size: Glidescope 4       Grade View of Cords: 1       Adjucts: stylet       Position: Right       Measured from: gums/teeth       Secured at (cm): 22       Bite block used: None    Post intubation assessment        Placement verified by: capnometry, equal breath sounds and chest rise        Number of attempts at approach: 1       Number of other approaches attempted: 0       Secured with: silk tape       Ease of procedure: easy       Dentition: Intact and Unchanged    Medication(s) Administered   Medication Administration Time: 10/16/2023 1:34 PM    Additional Comments       Patient has small mouth opening and very stiff neck and jaw. Airway was difficult to manipulate, but visualization was good once scope in place.

## 2023-10-16 NOTE — PROGRESS NOTES
Surgery    Reports abdominal pain. Defines area of discomfort as the entire abdomen.  +bowel function   NPO for ercp today  Denies CP, dyspnea    Gen:  Awake, Alert, NAD  /74 (BP Location: Left arm)   Pulse 63   Temp 98.2  F (36.8  C) (Axillary)   Resp 18   SpO2 97%   Resp - Non-labored  Abdomen - soft, globally tender, more so epigastric and RUQ areas. Non distended.   Extremities - no lower extremity edema or tenderness with palpation    LFT's normal  Cre 1.6    A/P 72 yo man with history of gastrostomy tube 2021 following admission for metastatic SCC of the tonsils with mets to liver, GERD, CAD [facility] with abdominal pain 2  to gallstone pancreatitis, choledocholithiasis.    - continue npo  - ercp today  - anticipate laparoscopic cholecystectomy tomorrow/tbd.    Karri Kumar PA-C  Office: [phone]  Pager: [phone]

## 2023-10-16 NOTE — PROVIDER NOTIFICATION
MD Notification    Notified Person: MD    Notified Person Name:  Arrieta    Notification Date/Time: 10/15 2205    Notification Interaction: vocera message     Purpose of Notification: Critical Potassium lab of 2.5    Orders Received: Continue with Potassium replacement protocol     Comments:

## 2023-10-17 NOTE — PLAN OF CARE
[id number] Nursing shift  Seen by surgeon who rescheduled the lap jennifer for tomorrow am (due to scheduling timing issue)  The NPO status changed back to regular with plans for NPO after midnight tonight  Pt remains weak and unsteady., Requires assist of 2/GB/walker when up ambulating/transfers (1 person to follow with IV pole). Sleepy most of shift.K+ 3.3 replacement started. Writer asked next shift to give 2nd dose per Potassium protocal order K+ lab recheck. Abnormal labs - MD's aware. Remains on D5 &1/2 NS with 75meq Bicarb at 75/hr. Poor oral intake. Has aversion to solid foods., BGM soft 88 - 70 (for which pudding and sprite with 1 packet sugar given) which raised it to 90)  Writer informed next shift nurse who will notify MD of BGMs and ask for further advisement.PT to see pt\.  Wife at bedside most of shift sh is a retired nurse, is very supportive to pt and participated in some of pt cares  Anticipate TCU when medically stable for discharge following his laparoscopic cholecystectomy tomorrow   by Surgeon Dr LANCE Walker,

## 2023-10-17 NOTE — PLAN OF CARE
[id number]  Primary Diagnosis: Gallstone pancreatitis  Orientation: A&Ox4, lethargic first half of shift.  Aggression Stop Light: green  Mobility: A1 GBW   Tele: NSR  Pain Management: abdominal pain managed with with IV dilaudid 0.2 mg, 2x, improved. Zofran given for nausea given 2x, improved symptoms.   Diet: Clear liquid. NPO @ midnight.  Bowel/Bladder: Urinal at bedside. No LBM this shift. Incontinent of bowel.   Abnormal Lab/Assessments: Potassium 3.6, AM recheck. VSS on RA, bradycardic @ times.  Drain/Device/Wound: N/A  Consults: GI, Gen Surg  D/C Day/Goals/Place: Pending overall improvement    Shift Note: ERCP completed 10/16, scheduled for  laparoscopic cholecystectomy today @ 1140.

## 2023-10-17 NOTE — PROGRESS NOTES
[facility]    Medicine Progress Note - Hospitalist Service    Date of Admission:  10/15/2023    Assessment & Plan   Quan Murphy is a 73 year old male admitted to [facility] on October 15 for gallstone pancreatitis. Pt was recently hospitalized from 10/7-10/10/23 . Quan Murphy is a 73 year old male with metastatic squamous cell carcinoma of tonsils, s/p chemoradiation, recent ablation of liver metastasis on 9/19/2023, CAD, hypertension, hyperlipidemia  He presented to [facility] ER on 9/27 with fever and abdominal pain.  Ultrasound showed acute cholecystitis with cholelithiasis.  He declined admission and left AMA.  Blood culture grew Enterococcus hirae sensitive to ampicillin.  He did not receive antibiotics for this.  Multiple attempts were made to contact the patient but were unsuccessful.  His abdominal pain subsequently resolved.  He presented to [facility] ER again on 10/7 with 1 week of diarrhea, nausea, anorexia, poor oral intake 10 pound weight loss in 1 week generalized weakness, dizziness and recurrent falls.    Work-up showed severe hypokalemia with potassium of 2.2 acute kidney injury with creatinine of 2.5.  There was concern that his symptoms are likely related to acute cholecystitis with cholelithiasis.  He was felt to be a high risk patient and was thus transferred to [facility] for general surgery evaluation. Consulted general surgery. HIDA scan was Negative for acute cholecystitis and biliary dyskinesia. No surgery recommended. Symptomatic treatment.Patient presented with ongoing abdominal pain, N/V and unable to eat due to pain and was only drinking sprite PTA       Choledocholithiasis status post ERCP and sphincterotomy, stone removal and stent placement by GI with Dr. Blood  on 10/16/2023  Going down to the OR for laparoscopic cholecystectomy on 10/17/2023  Possible acute cholangitis with first seen during ERCP and CBD  Gallstone  pancreatitis  Acute metabolic encephalopathy  Patient presented to the emergency room with a 5-day history of nausea, vomiting, abdominal pain.  Blood work was significant for an ALP elevation and lipase elevation.  CT scan revealed cholelithiasis with a density in the region of the distal common bile duct near the ampulla most likely representing choledocholithiasis.  Peripancreatic edema was also seen consistent with pancreatitis.  The patient has been having ongoing issues with acute cholecystitis and cholelithiasis since September and would benefit from cholecystectomy while inpatient.  Patient was not able to tolerate diet since studies of discharge and was only drinking Sprite as per wife at bedside  On IV PPI BID   -N.p.o for laparoscopic cholecystectomy  -IV Zosyn  -IV fluids  -Follow-up GI consult with Dr. Blood and GEN surge consult   planning on EUS and  ERCP, sphincterotomy, stone removal and stent placement on 10/16/2023  Pus was noted in the CBD with during the stone removal  Continue IV Zosyn for total of 1 week course of antibiotics  Going down for laparoscopic cholecystectomy today      Enterococcal bacteremia prior to admission          1/2 bottles positive on 9/27 at OSH- patient refused treatment and left.   Repeat blood cultures remain negative   He was treated with Unasyn and discharged on Augmentin.  Blood cultures repeated remain negative so far  On IV zosyn now continue antibiotics with orals Ceftin and Flagyl on discharge for total of 1 week course of antibiotics    Severe hypokalemia at 1.6 improved   Likely due to GI losses from his vomiting.  Asymptomatic with generalized weakness and EKG changes.  Potassium has significantly improved with replacement and last check was 2.8 prior to transfer; however I believe that this is an erroneous lab value as the patient had only received 30 mill equivalents of potassium prior to rechecking.  -Follow-up repeat chemistry  -Potassium  "replacement protocol  Magnesium was checked and returned normal at 2    Replace and recheck with close monitoring  On scheduled potassium at 40meq PO daily     Potassium is again low at 3 on 10/17/2023 replace per protocol    Metabolic acidosis with bicarb of 9:  Lactte normal  Ketones normal   Started on bicarb in IVF   Follow BMP closely   Bicarb slightly better improved from 9-15 on 10/17/2023    SURESH  Creatinine was 1.09 on September 27, admission creatinine is 1.98.  Presumably hypovolemic secondary to his nausea, and vomiting.  -IV hydration  -Monitor renal function  -Avoid nephrotoxic medications  Cretinine at 1.6 -1.5 currently     Monitor for any signs of fluid overload  Appears euvolemic currently    Metastatic squamous cell carcinoma of the tonsils status post chemoradiation  Mets to the liver status post ablation on September 19, 2023  -Follows at the [facility] ER  .  Coronary artery disease  Hypertension  Dyslipidemia  Chest pain-free on exam.  EKG is abnormal with changes consistent of hypokalemia as mentioned above.  -Resumed PTA metoprolol and Crestor                 Diet: NPO per Anesthesia Guidelines for Procedure/Surgery Except for: Meds    DVT Prophylaxis: Pneumatic Compression Devices  Stone Catheter: Not present  Lines: None     Cardiac Monitoring: None  Code Status: Full Code      Clinically Significant Risk Factors        # Hypokalemia: Lowest K = 2.1 mmol/L in last 2 days, will replace as needed    # Hypercalcemia: corrected calcium is >10.1, will monitor as appropriate    # Hypoalbuminemia: Lowest albumin = 2.4 g/dL at 10/16/2023  6:22 AM, will monitor as appropriate       # Hypertension: Noted on problem list        # Overweight: Estimated body mass index is 27.22 kg/m  as calculated from the following:    Height as of 10/7/23: 1.727 m (5' 8\").    Weight as of 10/14/23: 81.2 kg (179 lb)., PRESENT ON ADMISSION            Disposition Plan      Expected Discharge Date: 10/18/2023    "   Destination: home with family              Rosalinda Moore MD  Hospitalist Service  [facility]  Securely message with collegefeed (more info)  Text page via [employer]eli Paging/Directory   ______________________________________________________________________    Interval History   Patient is resting comfortably in bed.  C/o intermittent abdominal pain improving. Nausea improved with zofran. Going down for laparoscopic cholecystectomy today.  Potassium slightly low at 3.  No other acute issues    Physical Exam   Vital Signs: Temp: 97.5  F (36.4  C) Temp src: Oral BP: 135/78 Pulse: 59   Resp: 18 SpO2: 99 % O2 Device: None (Room air) Oxygen Delivery: 6 LPM  Weight: 0 lbs 0 oz    General Appearance: Alert awake, not in acute distress  Respiratory: Clear to auscultation bilaterally, diminished in the bases  Cardiovascular: Normal rate rhythm regular  GI: Right upper quadrant and epigastric tenderness present, abdomen nondistended soft, bowel sounds positive no guarding rigidity or rebound noted  Skin: Warm and dry  Psychiatric mood and affect are normal  Neurological; able to move all extremities    Medical Decision Making       55 MINUTES SPENT BY ME on the date of service doing chart review, history, exam, documentation & further activities per the note.      Data     I have personally reviewed the following data over the past 24 hrs:    6.5  \   10.4 (L)   / 218     144 119 (H) 12.2 /  93   3.0 (L) 15 (L) 1.51 (H) \     ALT: 13 AST: 18 AP: 184 (H) TBILI: 0.5   ALB: 2.6 (L) TOT PROTEIN: 5.7 (L) LIPASE: 92 (H)       Imaging results reviewed over the past 24 hrs:   Recent Results (from the past 24 hour(s))   XR Surgery DAVID L/T 5 Min Fluoro w Stills    Narrative    This exam was marked as non-reportable because it will not be read by a   radiologist or a [place] non-radiologist provider.

## 2023-10-17 NOTE — PROGRESS NOTES
[facility]  Gastroenterology Progress Note     Quan Murphy MRN# [id number]   YOB: 1950 Age: 73 year old          Assessment and Plan:     Quan Murphy is a 73 year old male admitted to [facility] on October 15 for gallstone pancreatitis.      Gallstone pancreatitis  Choledocholithiasis  Cholelithiasis  5 day history n/v and abdominal pain  . Lipase 1943  CT scan revealed cholelithiasis with a density in the region of the distal common bile duct near the ampulla most likely representing choledocholithiasis.  Peripancreatic edema was also seen consistent with pancreatitis.    Suspect gallstone pancreatitis and choledocholithiasis  10/16/23 EUS revealed one stone with sludge visualized in CBD with dilation up to 10 mm.  10/16/23 ERCP revealed acquired duodenal stenosis. Removal of choledocholithiasis was accomplished by biliary sphincterotomy and balloon extraction. The biliary tree was swept and pus was found. One temporary stent was placed into the common bile duct.      - Daily CMP  - NPO for laparoscopic cholecystectomy today  - Continue IVF fluids, prn pain control and antiemetics  - Recommend stent removal in 3 months  - GI will continue to follow     Liver lesion  Abdominal ultrasound noted mass like lesions in the right hepatic lobe corresponding to CT  EUS scheduled can further investigate. May be due to inflammatory effect from above.  No abnormal findings noted on EUS.     - Repeat imaging in 6 months                  Interval History:     C/o mild abdominal pain and nausea. Denies vomiting, diarrhea, melena and hematochezia overnight. Patient is NPO for cholecystectomy today.              Review of Systems:     C: NEGATIVE for fever, chills, change in weight  E/M: NEGATIVE for ear, mouth and throat problems  R: NEGATIVE for significant cough or SOB  CV: NEGATIVE for chest pain, palpitations or peripheral edema             Medications:   I have  reviewed this patient's current medications   ceFAZolin  2 g Intravenous Pre-Op/Pre-procedure x 1 dose    ceFAZolin  2 g Intravenous See Admin Instructions    cetirizine  10 mg Oral Daily    citalopram  40 mg Oral Daily    DULoxetine  60 mg Oral Daily    levothyroxine  100 mcg Oral Daily    metoprolol succinate ER  25 mg Oral Daily    pantoprazole  40 mg Intravenous BID    piperacillin-tazobactam  3.375 g Intravenous Q6H    potassium chloride  40 mEq Oral Daily    pregabalin  150 mg Oral Daily    sodium chloride (PF)  3 mL Intracatheter Q8H                  Physical Exam:   Vitals were reviewed  Vital Signs with Ranges  Temp:  [97.1  F (36.2  C)-98.2  F (36.8  C)] 97.9  F (36.6  C)  Pulse:  [58-72] 59  Resp:  [13-22] 16  BP: (124-156)/(74-86) 124/76  SpO2:  [96 %-100 %] 100 %  I/O last 3 completed shifts:  In: 750 [P.O.:450; I.V.:300]  Out: 1675 [Urine:1675]  Constitutional: Healthy, alert, and no distress   Cardiovascular: RRR. No murmurs, clicks, gallops or rubs  Respiratory: CTAB, no increased work of breathing  Abdomen: Abdomen soft, mild tenderness in RUQ region, non distended, BS normal             Data:   I reviewed the patient's new clinical lab test results.   Recent Labs   Lab Test 10/17/23  0959 10/16/23  0622 10/15/23  0504 10/08/23  0720 10/07/23  0953 09/27/23  1155   WBC 6.5 5.9 6.0   < > 7.4 8.0   HGB 10.4* 9.6* 11.6*   < > 12.6* 12.8*   MCV 81 81 81   < > 81 84    193 222   < > 298 225   INR  --   --  1.14  --  1.28* 1.29*    < > = values in this interval not displayed.     Recent Labs   Lab Test 10/17/23  0959 10/16/23  0622 10/16/23  0045 10/15/23  1228 10/15/23  0504   POTASSIUM 3.0* 3.6  3.6 3.2*   < > 1.6*   CHLORIDE 119* 127*  --   --  115*   CO2 15* 9*  --   --  12*   BUN 12.2 14.5  --   --  11.2   ANIONGAP 10 9  --   --  13    < > = values in this interval not displayed.     Recent Labs   Lab Test 10/17/23  0959 10/16/23  0622 10/15/23  0504 10/14/23  3734 10/14/23  3740  10/08/23  0720 10/07/23  1328 10/07/23  0953 09/27/23  1329   ALBUMIN 2.6* 2.4* 2.9*  --  3.4*   < >  --  3.2*  --    BILITOTAL 0.5 0.6 0.6  --  0.6   < >  --  0.5  --    ALT 13 14 18  --  19   < >  --  43  --    AST 18 19 23  --  25   < >  --  49*  --    PROTEIN  --   --   --  100*  --   --  100*  --  30*   LIPASE 92*  --   --   --  1,943*  --   --  102*  --     < > = values in this interval not displayed.       I reviewed the patient's new imaging results.    All laboratory data reviewed  All imaging studies reviewed by me.    [facility]  Office: [phone]  Cell: [phone] (Dr. Blood)  Cell: [phone] (Brianna Herrmann PA-C)

## 2023-10-17 NOTE — PROGRESS NOTES
Surgery    Reports abdominal pain improving. Defines area of discomfort as the entire abdomen.  +bowel function   S/p ERCP  Denies CP, dyspnea    Gen:  Awake, Alert, NAD  /49 (BP Location: Left arm)   Pulse 57   Temp 98  F (36.7  C) (Oral)   Resp 18   SpO2 100%   Resp - Non-labored  Abdomen - soft, mild RUQ tenderness.  Non distended.   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    - ok for regular diet today; npo after mn  - anticipate laparoscopic cholecystectomy tomorrow    Mele Walker DO

## 2023-10-18 NOTE — PROVIDER NOTIFICATION
MD Notification    Notified Person: MD    Notified Person Name: DB CELAYA     Notification Date/Time: 10/17/23  8:38pm    Notification Interaction: [employer]    Purpose of Notification: Blood glucose has gone down to 61.    Orders Received: PRN glucose/dextrose medications.

## 2023-10-18 NOTE — OP NOTE
General Surgery Operative Note    PREOPERATIVE DIAGNOSIS:  Gallstone pancreatitis [K85.10]    POSTOPERATIVE DIAGNOSIS:  Same    PROCEDURE:   Procedure(s):  CHOLECYSTECTOMY, LAPAROSCOPIC    ANESTHESIA:  General.      SURGEON:  Dannie Warner MD    ASSISTANT:  Karri Kumar PA-C. The physician assistant was medically necessary for their expertise in camera management, suctioning, and retraction    INDICATIONS:  The patient has a tumor and gallstones.  The risks, including but not limited to bleeding, infection, bile duct or bowel injury, anesthesia, and the possible need for an open approach were reviewed. The patient appeared to understand and wished to proceed with operation.    PROCEDURE:  The patient was taken to the operating suite.  The operative area was prepped and draped in a sterile fashion.  Surgeon initiated timeout was acknowledged.      Under general anesthesia the abdomen was insufflated through a periumbilical incision with a veress needle. Over 3 liters were place with low pressures. A 5mm trocar was placed. There was no injury seen when the camera was placed. Under direct vision two 5mm trocars were placed in the right upper quadrant and an 11mm trocar placed below the xiphoid. The gallbladder was grasped and adhesions taken down with blunt dissection and cautery. The peritoneal surfaces of the critical angle were cauterized posteriorly and anteriorly. The critical angle was dissected out, until there were only two structures remaining. Once these structures were identified, the duct was doubly clipped proximally, singly clipped distally and divided. The cystic artery was double clipped proximally, singly clipped distally and divided. The gallbladder was dissected off its bed with cautery from medial to lateral. The gallbladder was set aside and hemostasis assured on the liver. Irrigation was used and suctioned out. The bed was dry and the clips were intact. The gallbladder was removed through the larger  incision, which was enlarged as needed to permit passage of the gallbladder. We then irrigated again, and saw no sign of blood of bile leak. We checked for veress needle injury, there was none. The large trocar was removed and the fascia closed with 0 Vicryl. Marcaine was instilled. Gas was suctioned out. Trocars were removed. Sponge count was reported as correct. All incisions were closed with 4-0 Vicryl and steri-strips.            INTRAOPERATIVE FINDINGS:  edematous gallbladder with stones    Dannie aWrner MD

## 2023-10-18 NOTE — ANESTHESIA CARE TRANSFER NOTE
Patient: Quan Murphy    Procedure: Procedure(s):  CHOLECYSTECTOMY, LAPAROSCOPIC       Diagnosis: Gallstone pancreatitis [K85.10]  Diagnosis Additional Information: No value filed.    Anesthesia Type:   General     Note:    Oropharynx: oropharynx clear of all foreign objects and spontaneously breathing  Level of Consciousness: awake  Oxygen Supplementation: face mask  Level of Supplemental Oxygen (L/min / FiO2): 6  Independent Airway: airway patency satisfactory and stable  Dentition: dentition unchanged  Vital Signs Stable: post-procedure vital signs reviewed and stable  Report to RN Given: handoff report given  Patient transferred to: PACU    Handoff Report: Identifed the Patient, Identified the Reponsible Provider, Reviewed the pertinent medical history, Discussed the surgical course, Reviewed Intra-OP anesthesia mangement and issues during anesthesia, Set expectations for post-procedure period and Allowed opportunity for questions and acknowledgement of understanding    Vitals:  Vitals Value Taken Time   BP     Temp     Pulse     Resp     SpO2         Electronically Signed By: MARCY Little CRNA  October 18, 2023  11:00 AM

## 2023-10-18 NOTE — CONSULTS
Care Management Initial Consult    General Information  Assessment completed with: [place]  Type of CM/SW Visit: Initial Assessment    Primary Care Provider verified and updated as needed: Yes   Readmission within the last 30 days: unable to assess      Reason for Consult: other (see comments) (elevated risk of readmission)  Advance Care Planning:            Communication Assessment  Patient's communication style: spoken language (English or Bilingual)    Hearing Difficulty or Deaf: no   Wear Glasses or Blind: no    Cognitive  Cognitive/Neuro/Behavioral: .WDL except  Level of Consciousness: alert  Arousal Level: opens eyes spontaneously  Orientation: oriented x 4  Mood/Behavior: calm, cooperative  Best Language: 0 - No aphasia  Speech: clear, spontaneous, logical    Living Environment:   People in home: spouse  Cheri  Current living Arrangements: condominium      Able to return to prior arrangements: yes       Family/Social Support:  Care provided by: self  Provides care for: no one  Marital Status:   Wife, Children  Cheri       Description of Support System: Supportive, Involved         Current Resources:   Patient receiving home care services: No     Community Resources: None  Equipment currently used at home: none  Supplies currently used at home: None    Employment/Financial:  Employment Status: retired        Financial Concerns: none   Referral to Financial Worker: No       Does the patient's insurance plan have a 3 day qualifying hospital stay waiver?  No    Lifestyle & Psychosocial Needs:  Social Determinants of Health     Food Insecurity: Not on file   Depression: Not at risk (3/9/2023)    PHQ-2     PHQ-2 Score: 2   Housing Stability: Not on file   Tobacco Use: Low Risk  (10/17/2023)    Patient History     Smoking Tobacco Use: Never     Smokeless Tobacco Use: Never     Passive Exposure: Not on file   Financial Resource Strain: Not on file   Alcohol Use: Not on file   Transportation Needs: Not on  file   Physical Activity: Not on file   Interpersonal Safety: Not on file   Stress: Not on file   Social Connections: Not on file       Functional Status:  Prior to admission patient needed assistance:   Dependent ADLs:: Independent  Dependent IADLs:: Independent       Mental Health Status:          Chemical Dependency Status:                Values/Beliefs:  Spiritual, Cultural Beliefs, Protestant Practices, Values that affect care: yes ([place])               Additional Information:  Per consult for elevated risk of readmission, met with patient to discuss discharge plan.  Per pt, prior to admit, he was independent with his ADLs & IADLs.  Discussed that PT and OT will evaluate him for discharge recommendations.  Discussed PCP follow-up will be scheduled prior to discharge.  Scheduled PCP-Nov 08, 2023  1:20 PM  (Arrive by 1:05 PM)  ED/Hospital Follow Up with Dannie Ponce MD  [facility] ([facility] )   Await PT/OT eval.    Inpatient Care Coordinator will continue to follow for discharge planning.   RUTH Barrera RN, BSN, OCN   Inpatient Care Coordination [address]  Office: [phone]

## 2023-10-18 NOTE — PROGRESS NOTES
[facility]    Medicine Progress Note - Hospitalist Service    Date of Admission:  10/15/2023    Assessment & Plan   Quan Murphy is a 73 year old male admitted to [facility] on October 15 for gallstone pancreatitis. Pt was recently hospitalized from 10/7-10/10/23 . Quan Murphy is a 73 year old male with metastatic squamous cell carcinoma of tonsils, s/p chemoradiation, recent ablation of liver metastasis on 9/19/2023, CAD, hypertension, hyperlipidemia  He presented to [facility] ER on 9/27 with fever and abdominal pain.  Ultrasound showed acute cholecystitis with cholelithiasis.  He declined admission and left AMA.  Blood culture grew Enterococcus hirae sensitive to ampicillin.  He did not receive antibiotics for this.  Multiple attempts were made to contact the patient but were unsuccessful.  His abdominal pain subsequently resolved.  He presented to [facility] ER again on 10/7 with 1 week of diarrhea, nausea, anorexia, poor oral intake 10 pound weight loss in 1 week generalized weakness, dizziness and recurrent falls.    Work-up showed severe hypokalemia with potassium of 2.2 acute kidney injury with creatinine of 2.5.  There was concern that his symptoms are likely related to acute cholecystitis with cholelithiasis.  He was felt to be a high risk patient and was thus transferred to [facility] for general surgery evaluation. Consulted general surgery. HIDA scan was Negative for acute cholecystitis and biliary dyskinesia. No surgery recommended. Symptomatic treatment.Patient presented with ongoing abdominal pain, N/V and unable to eat due to pain and was only drinking sprite PTA      Choledocholithiasis status post ERCP and sphincterotomy, stone removal and stent placement by GI with Dr. Blood  on 10/16/2023  Possible acute cholangitis with first seen during ERCP and CBD  Gallstone pancreatitis  Acute metabolic encephalopathy. Resolved.  Patient presented to  the emergency room with a 5-day history of nausea, vomiting, abdominal pain.  Blood work was significant for an ALP elevation and lipase elevation.  CT scan revealed cholelithiasis with a density in the region of the distal common bile duct near the ampulla most likely representing choledocholithiasis.  Peripancreatic edema was also seen consistent with pancreatitis.  The patient has been having ongoing issues with acute cholecystitis and cholelithiasis since September.   performed EUS and  ERCP, sphincterotomy, stone removal and stent placement on 10/16/2023  Pus was noted in the CBD with during the stone removal.  Still with RUQ pain.  - Cont PPI.   - N.p.o for laparoscopic cholecystectomy today. IVF.  - Cont Zosyn (total of 1 week of abx-Ceftin and Flagyl at discharge)  - Laparoscopic cholecystectomy today.  - Appreciate GI/surgery assistance).      Enterococcal bacteremia prior to admission       1/2 bottles positive on 9/27 at OSH - patient refused treatment and left.   Repeat blood cultures remain negative   He was treated with Unasyn and discharged on Augmentin.  Blood cultures repeated remain negative so far  On IV zosyn now.    Severe hypokalemia at 1.6 improved   Likely due to GI losses from his vomiting.  Asymptomatic with generalized weakness and EKG changes.  Potassium has significantly improved with replacement and last check was 2.8 prior to transfer; however I believe that this is an erroneous lab value as the patient had only received 30 mill equivalents of potassium prior to rechecking. 3.2 this am.  - Replace as indicated.  - Monitor levels.      Metabolic acidosis with bicarb of 9:  Lactate normal  Ketones normal   Started on bicarb in IVF   Follow BMP closely   Bicarb improved from 9-17 on 10/18/2023    SURESH  Creatinine was 1.09 on September 27, admission creatinine is 1.98.  Presumably hypovolemic secondary to his nausea, and vomiting.  Improved to 1.46 this am.  -IV hydration  -Monitor  renal function  -Avoid nephrotoxic medications    Metastatic squamous cell carcinoma of the tonsils status post chemoradiation  Mets to the liver status post ablation on September 19, 2023  -Follows at the [facility]  .  Coronary artery disease  Hypertension  Dyslipidemia  Chest pain-free on exam.  EKG is abnormal with changes consistent of hypokalemia as mentioned above.  -Resumed PTA metoprolol and Crestor              Diet: NPO per Anesthesia Guidelines for Procedure/Surgery Except for: Meds    DVT Prophylaxis: Pneumatic Compression Devices  Stone Catheter: Not present  Lines: None     Cardiac Monitoring: None  Code Status: Full Code      Disp: TBD (pending postsurgical course and sxs control (2-3 days).    Edwin Langston MD  Hospitalist Service  [facility]    ______________________________________________________________________    Interval History   Patient is resting comfortably in bed.  Still with abd pain though better controlled. No n/v.  No cp/sob.    Physical Exam   Vital Signs: Temp: 97.2  F (36.2  C) Temp src: Oral BP: 131/77 Pulse: 60   Resp: 17 SpO2: 97 % O2 Device: None (Room air)    Weight: 0 lbs 0 oz    General Appearance: Alert awake, not in acute distress  Respiratory: Clear to auscultation bilaterally, diminished in the bases  Cardiovascular: Normal rate rhythm regular  GI: Right upper quadrant and epigastric tenderness present, abdomen nondistended soft, bowel sounds positive no guarding rigidity or rebound noted  Skin: Warm and dry  Psychiatric mood and affect are normal  Neurological; able to move all extremities      Data     I have personally reviewed the following data over the past 24 hrs:    6.4  \   9.9 (L)   / 183     143 116 (H) 8.8 /  79   3.2 (L); 3.2 (L) 17 (L) 1.46 (H) \     ALT: 13 AST: 18 AP: 184 (H) TBILI: 0.5   ALB: 2.6 (L) TOT PROTEIN: 5.7 (L) LIPASE: 92 (H)       Imaging results reviewed over the past 24 hrs:   No results found for this or any  previous visit (from the past 24 hour(s)).

## 2023-10-18 NOTE — PLAN OF CARE
Goal Outcome Evaluation:       0700-1900    Primary Diagnosis: Gallstone pancreatitis  Orientation: A&Ox4,  Aggression Stop Light: green  Mobility: A1 GB/W. Ambulated in hallway, dizziness reported at the end of the walk. Pt denies dizziness at the time of reassessment  Tele: N/A  Pain Management: 5-6/10 abdominal pain managed with with  Oxycodone and IV dilaudid 0.4 mg, 2x, improved. Zofran given for nausea given 1x. Cold therapy also applied on abdomen   Diet: Full liquid.  Bowel/Bladder: Continent. Urinal at bedside. 1x loose BM. Pt ambulated to bathroom  Abnormal Lab/Assessments: Potassium 3.2 from overnight, replaced at start of shift, recheck pending. VSS on RA. BS check is 73.  Drain/Device/Wound: IV infusing Bicarb 75,Eq in D5 1/2 NS @75ml/hr. Intermittent abx.  Consults: Pending care mgmt  POD 0 laparoscopic cholecystectomy, 4 LAP SITES CDI, - passing flatus. BS hypoactive   D/C Day/Goals/Place: Pending overall improvement.

## 2023-10-18 NOTE — OR NURSING
Spoke with Dr. Aparicio regarding potassium level and intervention as well as metoprolol not given this am. Order obtained to give home dose now. Pt expressed wanting zofran and pain medication for a pain rating of 6/10. Order obtained for 4mg zofran and 50 mcg fentanyl iv

## 2023-10-18 NOTE — ANESTHESIA PREPROCEDURE EVALUATION
Anesthesia Pre-Procedure Evaluation    Patient: Quan Murphy   MRN: [id number] : 1950        Procedure : Procedure(s):  CHOLECYSTECTOMY, LAPAROSCOPIC          Past Medical History:   Diagnosis Date    Allergic rhinitis     Allergy, unspecified not elsewhere classified     Seasonal allergies, pollen, dust, smoke and animals    Antiplatelet or antithrombotic long-term use     Anxiety     Arthritis     Chest pain     Chronic sinusitis     Coronary atherosclerosis of unspecified type of vessel, native or graft     Coronary artery disease    Depressive disorder     Gastroesophageal reflux disease     Cleared with medication    Head injury     Hiatal hernia     Right Side    History of blood transfusion 12/15/2004    Prostate Surgery - My own blood    Hyperlipidemia     Hypertension     Inguinal hernia     Kidney problem 10/08/2017    Lithotripsy    Kidney stones     Malignant neoplasm of prostate (H)     Prostate cancer    Prostate cancer (H)     Syncope     Tonsil cancer (H)       Past Surgical History:   Procedure Laterality Date    ARTHRODESIS FOOT  2013    Procedure: ARTHRODESIS FOOT;  Great Toe Arthrodesis Left Foot;  Surgeon: Ash Gonzalez DPM;  Location: [facility]    ARTHRODESIS FOOT  6/10/2014    Procedure: ARTHRODESIS FOOT;  Surgeon: Ash Gonzalez DPM;  Location: [facility] OR    BIOPSY  10/1/2004    dermatologist biopsies    COLONOSCOPY  10/7/2013    Procedure: COLONOSCOPY;  Colonoscopy;  Surgeon: Mike Fallon MD;  Location: [facility] GI    CYSTOSCOPY N/A 2022    Procedure: CYSTOSCOPY and bladder stone removal;  Surgeon: David Rogers MD;  Location:  OR    ENDOSCOPIC RETROGRADE CHOLANGIOPANCREATOGRAM COMPLEX N/A 10/16/2023    Procedure: ENDOSCOPIC RETROGRADE CHOLANGIOPANCREATOGRAPHY, COMPLEX;  Surgeon: Trent Blood MD;  Location:  OR    ENDOSCOPIC ULTRASOUND UPPER GASTROINTESTINAL TRACT (GI) N/A 10/16/2023    Procedure: Endoscopic ultrasound upper  gastrointestinal tract (GI);  Surgeon: Trent Blood MD;  Location: [facility] OR    ESOPHAGOSCOPY, GASTROSCOPY, DUODENOSCOPY (EGD), COMBINED N/A 2/12/2020    Procedure: ESOPHAGOGASTRODUODENOSCOPY (EGD);  Surgeon: Sam Escobar MD;  Location: [facility]    EXTRACORPOREAL SHOCK WAVE LITHOTRIPSY (ESWL) Bilateral 10/18/2017    Procedure: EXTRACORPOREAL SHOCK WAVE LITHOTRIPSY (ESWL);  BILATERAL EXTRACORPOREAL SHOCKWAVE LITHOTRIPSY ;  Surgeon: Meir Torres MD;  Location: [facility] OR    HC CORRECT BUNION,SIMPLE  08/11/2005    x3    HC REMV TOE BENIGN BONE LESN  08/11/2005    HEAD & NECK SURGERY  1954    From a fall    HERNIORRHAPHY INGUINAL  7/3/2013    Procedure: HERNIORRHAPHY INGUINAL;  Open Repair Inguinal hernia Right with mesh ;  Surgeon: Sam Escobar MD;  Location: [facility] OR    IR GASTROSTOMY TUBE PERCUTANEOUS PLCMNT  6/7/2021    MOHS MICROGRAPHIC PROCEDURE  08/23/11    ear and chin-[facility]    OPEN REDUCTION INTERNAL FIXATION WRIST Right 7/18/2017    Procedure: OPEN REDUCTION INTERNAL FIXATION WRIST;  Right distal radius open reduction and internal fixation;  Surgeon: Pedro Blanca DO;  Location: [facility] OR    RECONSTRUCT FOREFOOT WITH METATARSOPHALANGEAL (MTP) FUSION  6/10/2014    Procedure: RECONSTRUCT FOREFOOT WITH METATARSOPHALANGEAL (MTP) FUSION;  Surgeon: Ash Gonzalez DPM;  Location: [facility] OR    STENT, CORONARY, DEMI      SURGICAL HISTORY OF -   1999/1974    lt knee    SURGICAL HISTORY OF -   10/2004    lithotripsy    SURGICAL HISTORY OF -   11/05    angiogram with stent    VASCULAR SURGERY  11/17/2005    Puncture of iliac artery during and angiogram    [facility] LAPAROSCOPY, SURGICAL PROSTATECTOMY, RETROPUBIC RADICAL, W/NERVE SPARING  11/30/2004    With full bilateral pelvic lymphadenectomy.  -[facility].    [facility] TOTAL KNEE ARTHROPLASTY  05/01/08    Left knee      Allergies   Allergen Reactions    Animal Dander     Azithromycin Nausea and Vomiting    Dust Mites     Pollen Extract     Smoke.        Social History     Tobacco Use    Smoking status: Never    Smokeless tobacco: Never   Substance Use Topics    Alcohol use: Yes     Alcohol/week: 10.0 standard drinks of alcohol     Types: 10 Cans of beer per week     Comment: 1 hard  liquid and 1 wine or beer per day      Wt Readings from Last 1 Encounters:   10/14/23 81.2 kg (179 lb)        Anesthesia Evaluation   Pt has had prior anesthetic.     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 The left ventricle is normal in structure, function and size. The visual  ejection fraction is estimated at 60%. Global peak LV longitudinal strain is  averaged at -19%. This is within reported normal limits (normal <-18%).  2. The right ventricle is normal in structure, function and size.  3. No valve disease.     Echo 10/2022 showed EF 55%, strain -19%.  ______________________________________________________________________________  Left Ventricle  The left ventricle is normal in structure, function and size. There is normal  left ventricular wall thickness. The visual ejection fraction is estimated at  60%. Global peak LV longitudinal strain is averaged at -19%. This is within  reported normal limits (normal <-18%). Left ventricular diastolic function is  normal. Normal left ventricular wall motion.     Right Ventricle  The right ventricle is normal in structure, function and size.     Atria  Normal left atrial size. Right atrial size is normal. There is no atrial shunt  seen.     Mitral Valve  There is mild (1+) mitral regurgitation.     Tricuspid Valve  There is mild (1+) tricuspid regurgitation. The right  ventricular systolic  pressure is approximated at 24.9 mmHg plus the right atrial pressure.     Aortic Valve  There is mild (1+) aortic regurgitation.     Pulmonic Valve  The pulmonic valve is normal in structure and function.     Vessels  Normal ascending, transverse (arch), and descending aorta. The inferior vena  cava was normal in size with preserved respiratory variability.     Pericardium  There is no pericardial effusion.     Rhythm  Sinus rhythm was noted.    3/2022 stress test  Result Text        The nuclear stress test is probably negative for inducible myocardial ischemia or infarction. (there is a fixed defect in inferoseptal wall with normal wall motion likely diaphragm attenuation     Left ventricular function is normal.     The left ventricular ejection fraction at stress is 61%.     There is no prior study for comparison.      (+)  hypertension- -  CAD -  - -   Taking blood thinners          fainting (syncope).                         METS/Exercise Tolerance:     Hematologic:     (+)      anemia,          Musculoskeletal:       GI/Hepatic: Comment: Choledocholithiasis  Gallstone pancreatitis      (+) GERD,     hiatal hernia,              Renal/Genitourinary: Comment: Severe hypokalemia on admission    Metabolic acidosis with bicarb of 9    (+) renal disease, type: CRI,     Nephrolithiasis ,       Endo:     (+)          thyroid problem,            Psychiatric/Substance Use:       Infectious Disease: Comment: Enterococcal bacteremia prior to admission      Malignancy:       Other:            Physical Exam    Airway  airway exam normal    Comment: Small mouth.    Mallampati: III   TM distance: > 3 FB   Neck ROM: full   Mouth opening: > 3 cm    Respiratory Devices and Support         Dental       (+) Minor Abnormalities - some fillings, tiny chips      Cardiovascular   cardiovascular exam normal          Pulmonary   pulmonary exam normal                OUTSIDE LABS:  CBC:   Lab Results   Component Value  Date    WBC 6.4 10/18/2023    WBC 6.5 10/17/2023    HGB 9.9 (L) 10/18/2023    HGB 10.4 (L) 10/17/2023    HCT 28.8 (L) 10/18/2023    HCT 30.9 (L) 10/17/2023     10/18/2023     10/17/2023     BMP:   Lab Results   Component Value Date     10/18/2023     10/17/2023    POTASSIUM 3.2 (L) 10/18/2023    POTASSIUM 3.2 (L) 10/18/2023    CHLORIDE 116 (H) 10/18/2023    CHLORIDE 119 (H) 10/17/2023    CO2 17 (L) 10/18/2023    CO2 15 (L) 10/17/2023    BUN 8.8 10/18/2023    BUN 12.2 10/17/2023    CR 1.46 (H) 10/18/2023    CR 1.51 (H) 10/17/2023    GLC 79 10/18/2023    GLC 88 10/18/2023     COAGS:   Lab Results   Component Value Date    PTT 35 10/07/2023    INR 1.14 10/15/2023     POC:   Lab Results   Component Value Date    BGM 85 06/06/2021     HEPATIC:   Lab Results   Component Value Date    ALBUMIN 2.6 (L) 10/17/2023    PROTTOTAL 5.7 (L) 10/17/2023    ALT 13 10/17/2023    AST 18 10/17/2023    ALKPHOS 184 (H) 10/17/2023    BILITOTAL 0.5 10/17/2023     OTHER:   Lab Results   Component Value Date    PH 7.52 (H) 06/04/2021    LACT 0.7 10/16/2023    LATRELL 9.0 10/18/2023    PHOS 3.3 10/07/2023    MAG 2.0 10/15/2023    LIPASE 92 (H) 10/17/2023    TSH 0.06 (L) 08/08/2023    T4 1.70 08/08/2023    T3 36 (L) 12/12/2022    CRP 13.8 (H) 12/09/2022    SED 8 02/11/2020       Anesthesia Plan    ASA Status:  3    NPO Status:  NPO Appropriate    Anesthesia Type: General.     - Airway: ETT   Induction: Intravenous, Propofol.   Maintenance: Balanced.   Techniques and Equipment:     - Airway: Video-Laryngoscope       Consents    Anesthesia Plan(s) and associated risks, benefits, and realistic alternatives discussed. Questions answered and patient/representative(s) expressed understanding.     - Discussed:     - Discussed with:  Patient            Postoperative Care    Pain management: IV analgesics, Multi-modal analgesia.   PONV prophylaxis: Ondansetron (or other 5HT-3), Dexamethasone or Solumedrol, Background Propofol  Infusion     Comments:                Rah Aparicio MD

## 2023-10-18 NOTE — ANESTHESIA PROCEDURE NOTES
Airway       Patient location during procedure: OR ([facility] - Operating Room or Procedural Area)       Procedure Start/Stop Times: 10/18/2023 9:43 AM  Staff -        Anesthesiologist:  Rah Aparicio MD       CRNA: David Upton APRN CRNA       Other Anesthesia Staff: Dhiraj Castaneda       Performed By: CRNA and SRNA  Consent for Airway        Urgency: elective  Indications and Patient Condition       Indications for airway management: mago-procedural       Induction type:intravenous       Mask difficulty assessment: 1 - vent by mask    Final Airway Details       Final airway type: endotracheal airway       Successful airway: ETT - single  Endotracheal Airway Details        ETT size (mm): 8.0       Cuffed: yes       Successful intubation technique: video laryngoscopy       VL Blade Size: Glidescope 4       Grade View of Cords: 4       Adjucts: stylet       Position: Right       Measured from: lips       Secured at (cm): 23       Bite block used: None    Post intubation assessment        Number of attempts at approach: 1       Number of other approaches attempted: 0       Secured with: silk tape       Ease of procedure: easy       Dentition: Intact and Unchanged    Medication(s) Administered   Medication Administration Time: 10/18/2023 9:43 AM

## 2023-10-18 NOTE — DISCHARGE INSTRUCTIONS
[facility] - [facility]GICAL CONSULT[facility]S  Discharge Instructions: Post-Operative Laparoscopic Cholecystectomy    ACTIVITY  Expect to feel tired after your surgery.  This will gradually resolve.    Take frequent, short walks and increase your activity gradually.    Avoid strenuous physical activity or heavy lifting greater than 15-20 lbs. for 2-3 weeks.  You may climb stairs.  You may drive without restrictions when you are not using any prescription pain medication and feel comfortable in a car.  You may return to work/school when you are comfortable without any prescription pain medication.    WOUND CARE  You may remove your outer dressing or Band-Aids and shower 48 hours after the surgery.  Pat your incisions dry and leave them open to air.  Re-apply dressing (Band-Aids or gauze/tape) as needed for comfort or drainage.  You have steri-strips (looks like white tape) on your incision.  You may peel off the steri-strips 2 weeks after your surgery if they have not peeled off on their own.   Do not soak your incisions in a tub or pool for 2 weeks.   Do not apply any lotions, creams, or ointments to your incisions.  A ridge under your incisions is normal and will gradually resolve.    DIET  Start with liquids, then gradually resume your regular diet as tolerated.  Avoid heavy, spicy, and greasy meals for 2-3 days.  Drink plenty of fluids to stay hydrated.  It is not uncommon to experience some loose stools or diarrhea after surgery.  This is your body's way of adapting to the bile which will slowly drain into your intestine.  A low fat diet may help with this.  This should improve over 1-2 months.    PAIN  Expect some tenderness and discomfort at the incision sites.  Take your muscle relaxant (robaxin for the first several days after surgery.  Use the prescribed pain medication (oxycodone) at your discretion.  If you need a refill of this you will need to talk to your physician who prescribes your pain prescriptions.   Expect gradual resolution of your pain over several days.  You may take ibuprofen with food (unless you have been told not to) or acetaminophen/Tylenol instead of or in addition to your prescribed pain medication.  You may take Tylenol 500 - 1000 mg every 6 hours as needed for pain - do not exceed 4,000 mg of tylenol (acetaminophen) from all sources in 24 hours.  You may take Ibuprofen 400 - 600 mg every 6 hours as needed for pain - do not exceed 2,400 mg of ibuprofen from all sources in 24 hours. Do not use Ibuprofen for any longer than necessary or take if you have been told not to by another medical provider.  You may alternate Ibuprofen and Tylenol every 3 hours as needed for pain. Reserve the narcotic prescription for refractory pain.  Do not drink alcohol or drive while you are taking pain medications.  You may apply ice to your incisions in 20 minute intervals as needed for the next 48 hours.  After that time, consider switching to heat if you prefer.    EXPECTATIONS  Pain medications can cause constipation.  Limit use when possible.  Take over the counter stool softener/stimulant, such as Colace or Senna, 1-2 times a day with plenty of water.  You may take a mild over the counter laxative, such as Miralax or a suppository, as needed.  You may discontinue these medications once you are having regular bowel movements and/or are no longer taking your narcotic pain medication.   You may have shoulder or upper back discomfort due to the gas used in surgery.  This is temporary and should resolve in 48-72 hours.  Short, frequent walks may help with this.  If you are unable to urinate, or feel as though you are not emptying your bladder adequately, we recommend you call our office and/or seek care at an ER or Urgent Care facility if after hours.    FOLLOW UP  Our office will contact you in approximately 2 weeks to check on your progress and answer any questions you may have.  If you are doing well, you will not need  to return for a follow up appointment.  If any concerns are identified over the phone, we will help you make an appointment to see a provider.   If you have not received a phone call, have any questions or concerns, or would like to be seen, please call us at [phone] and ask to speak with our nurse.  We are located at [address] [place] [address].    CALL OUR OFFICE -[phone] IF YOU HAVE:   Chills or fever above 101 F.  Increased redness, warmth, or drainage at your incisions.  Significant bleeding.  Pain not relieved by your pain medication or rest.  Increasing pain after the first 48 hours.  Any other concerns or questions.

## 2023-10-18 NOTE — ANESTHESIA POSTPROCEDURE EVALUATION
Patient: Quan Murphy    Procedure: Procedure(s):  CHOLECYSTECTOMY, LAPAROSCOPIC       Anesthesia Type:  General    Note:  Disposition: Inpatient   Postop Pain Control: Uneventful            Sign Out: Well controlled pain   PONV: No   Neuro/Psych: Uneventful            Sign Out: Acceptable/Baseline neuro status   Airway/Respiratory: Uneventful            Sign Out: Acceptable/Baseline resp. status   CV/Hemodynamics: Uneventful            Sign Out: Acceptable CV status   Other NRE:    DID A NON-ROUTINE EVENT OCCUR? No           Last vitals:  Vitals Value Taken Time   /74 10/18/23 1145   Temp 37.1  C (98.8  F) 10/18/23 1143   Pulse 58 10/18/23 1154   Resp 14 10/18/23 1154   SpO2 95 % 10/18/23 1155   Vitals shown include unfiled device data.    Electronically Signed By: Crystal Baez  October 18, 2023  12:38 PM

## 2023-10-18 NOTE — PLAN OF CARE
Goal Outcome Evaluation:    [id number]  Primary Diagnosis: Gallstone pancreatitis  Orientation: A&Ox4, forgetful at times  Aggression Stop Light: green  Mobility: A1 GBW   Tele: N/A  Pain Management: 5-6/10 abdominal pain managed with with IV dilaudid 0.2 mg, 2x, improved. Zofran given for nausea given 1x and compazine given 1x, pt states compazine works more effectively.   Diet: Reg, very poor intake. NPO @ midnight.  Bowel/Bladder: Continent. Urinal at bedside. 1x loose BM.   Abnormal Lab/Assessments: Potassium 3.4, replaced, recheck at 3am. VSS on RA. Blood sugars have been low; tried juices/ice cream/pudding with minimal improvement; glucose went down to 61 (pt was asymptomatic), oncall hospitalist notified and PRN dextrose/glucose ordered and given 2x. Last BG check at 10:41pm was 99. Did not give any more since BG at 22:17 was 88 and patient has some D5 in the cont. IVF.  Drain/Device/Wound: IV infusing Bicarb 75,Eq in D5 1/2 NS @75ml/hr. Intermittent abx.  Consults: Pending care mgmt  Scheduled for laparoscopic cholecystectomy in AM  D/C Day/Goals/Place: TCU when medically stable for discharge following his procedure.    PT wife called to update writer and pt that she is unable to come to the hospital today, pt updated. CHG bath completed.

## 2023-10-19 NOTE — PLAN OF CARE
Physical Therapy Discharge Summary    Reason for therapy discharge:    Discharged to home.    Progress towards therapy goal(s). See goals on Care Plan in [facility] electronic health record for goal details.  Goals partially met.  Barriers to achieving goals:   discharge from facility.    Therapy recommendation(s):    Pt currently below reported IND baseline for mobility. Able to ambulate 250 ft with SBA at FWW today. Rec home with assist of spouse, use of FWW, HHPT to address continued deficits.

## 2023-10-19 NOTE — PROGRESS NOTES
[facility]  Gastroenterology Progress Note     Quan Murphy MRN# [id number]   YOB: 1950 Age: 73 year old          Assessment and Plan:     Quan Murphy is a 73 year old male admitted to [facility] on October 15 for gallstone pancreatitis.      Gallstone pancreatitis  Choledocholithiasis  Cholelithiasis  5 day history n/v and abdominal pain  . Lipase 1943  CT scan revealed cholelithiasis with a density in the region of the distal common bile duct near the ampulla most likely representing choledocholithiasis.  Peripancreatic edema was also seen consistent with pancreatitis.    Suspect gallstone pancreatitis and choledocholithiasis  10/16/23 EUS revealed one stone with sludge visualized in CBD with dilation up to 10 mm.  10/16/23 ERCP revealed acquired duodenal stenosis. Removal of choledocholithiasis was accomplished by biliary sphincterotomy and balloon extraction. The biliary tree was swept and pus was found. One temporary stent was placed into the common bile duct.   10/18/23 Laparoscopic cholecystectomy      - Diet as tolerated  - Continue antibiotics and prn pain control   - Recommend stent removal in 3 months  - Outpatient follow up with GI in 1-2 weeks     Liver lesion  Abdominal ultrasound noted mass like lesions in the right hepatic lobe corresponding to CT. EUS scheduled can further investigate. May be due to inflammatory effect from above.  No abnormal findings noted on EUS.     - Repeat imaging in 6 months                Interval History:     Cholecystectomy yesterday. Pain well controlled. Tolerating a full liquid diet without worsening abdominal pain, nausea and vomiting. Patient had 2 small bowel movements this morning. Denies melena and hematochezia.              Review of Systems:     C: NEGATIVE for fever, chills, change in weight  E/M: NEGATIVE for ear, mouth and throat problems  R: NEGATIVE for significant cough or SOB  CV: NEGATIVE for  chest pain, palpitations or peripheral edema             Medications:   I have reviewed this patient's current medications   cetirizine  10 mg Oral Daily    ciprofloxacin  500 mg Oral Q12H HARRIS (08/20)    citalopram  40 mg Oral Daily    DULoxetine  60 mg Oral Daily    levothyroxine  100 mcg Oral Daily    metoprolol succinate ER  25 mg Oral Daily    metroNIDAZOLE  500 mg Oral BID    pantoprazole  40 mg Oral BID AC    potassium chloride  40 mEq Oral Daily    pregabalin  150 mg Oral Daily    sodium chloride (PF)  3 mL Intracatheter Q8H                  Physical Exam:   Vitals were reviewed  Vital Signs with Ranges  Temp:  [97.4  F (36.3  C)-98.8  F (37.1  C)] 97.5  F (36.4  C)  Pulse:  [54-68] 61  Resp:  [12-20] 16  BP: (124-167)/(70-94) 148/82  SpO2:  [95 %-100 %] 99 %  I/O last 3 completed shifts:  In: 800 [I.V.:800]  Out: 865 [Urine:850; Blood:15]  Constitutional: Healthy, alert, and no distress   Cardiovascular: RRR. No murmurs, clicks, gallops or rubs  Respiratory: CTAB, no increased work of breathing  Abdomen: Abdomen soft, mild tenderness in RUQ region, non distended, BS normal. Incisions covered by band aids noted to abdomen without surrounding erythema.             Data:   I reviewed the patient's new clinical lab test results.   Recent Labs   Lab Test 10/19/23  0553 10/18/23  0338 10/17/23  0959 10/16/23  0622 10/15/23  0504 10/08/23  0720 10/07/23  0953 09/27/23  1155   WBC 5.3 6.4 6.5   < > 6.0   < > 7.4 8.0   HGB 9.8* 9.9* 10.4*   < > 11.6*   < > 12.6* 12.8*   MCV 82 81 81   < > 81   < > 81 84    183 218   < > 222   < > 298 225   INR  --   --   --   --  1.14  --  1.28* 1.29*    < > = values in this interval not displayed.     Recent Labs   Lab Test 10/19/23  0553 10/18/23  1543 10/18/23  0338 10/17/23  2130 10/17/23  0959   POTASSIUM 3.6  3.6 3.4 3.2*  3.2*   < > 3.0*   CHLORIDE 112*  --  116*  --  119*   CO2 16*  --  17*  --  15*   BUN 6.8*  --  8.8  --  12.2   ANIONGAP 11  --  10  --  10    < > =  values in this interval not displayed.     Recent Labs   Lab Test 10/19/23  0553 10/17/23  0959 10/16/23  0622 10/15/23  0504 10/14/23  2316 10/14/23  2155 10/08/23  0720 10/07/23  1328 10/07/23  0953 09/27/23  1329   ALBUMIN 2.4* 2.6* 2.4*   < >  --  3.4*   < >  --  3.2*  --    BILITOTAL 0.6 0.5 0.6   < >  --  0.6   < >  --  0.5  --    ALT 19 13 14   < >  --  19   < >  --  43  --    AST 33 18 19   < >  --  25   < >  --  49*  --    PROTEIN  --   --   --   --  100*  --   --  100*  --  30*   LIPASE  --  92*  --   --   --  1,943*  --   --  102*  --     < > = values in this interval not displayed.       I reviewed the patient's new imaging results.    All laboratory data reviewed  All imaging studies reviewed by me.    [facility]  Office: [phone]  Cell: [phone] (Dr. Blood)  Cell: [phone] (Brianna Herrmann PA-C)

## 2023-10-19 NOTE — PLAN OF CARE
19:00-07:30  Orientation: A&Ox4  Activity: Ax1 GB+W  Diet/BS Checks: Full liquid diet  Tele: N/A  IV Access/Drains: R PIV infusing NaHCO3 in D5+1/2 NS @ 75mL/hr w/ int abx  Pain Management: IV dilaudid x1, PO oxycodone. Had dose increased to 10mg  Abnormal VS/Results: Cr: 1.46, B  Bowel/Bladder: Continent of B/B  Skin/Wounds: 4 lap sites WDL  Consults: PT/OT  D/C Disposition: Plan pending PT/OT consults  Other Info:

## 2023-10-19 NOTE — PROVIDER NOTIFICATION
MD Notification    Notified Person: MD    Notified Person Name: Dr. Langston    Notification Date/Time: 1140 10/19/23    Notification Interaction: Voclucia    Purpose of Notification: Hello, pt and family would like to talk to you about possible discharge today. please come upstairs when you have time    Orders Received:    Comments:

## 2023-10-19 NOTE — PROGRESS NOTES
"   10/19/23 0900   Appointment Info   Signing Clinician's Name / Credentials (PT) China Ramsey DPT   Living Environment   People in Home spouse   Current Living Arrangements condominium   Home Accessibility stairs to enter home;stairs within home   Number of Stairs, Main Entrance 6   Stair Railings, Main Entrance railing on right side (ascending)   Number of Stairs, Within Home, Primary seven   Stair Railings, Within Home, Primary railings safe and in good condition   Transportation Anticipated family or friend will provide   Living Environment Comments Pt reports all needs can be met on main level of home, does not need to access basement   Self-Care   Usual Activity Tolerance good   Current Activity Tolerance moderate   Equipment Currently Used at Home walker, rolling   Fall history within last six months yes   Number of times patient has fallen within last six months 2   Activity/Exercise/Self-Care Comment IND for mobility and ADLs at baseline   General Information   Onset of Illness/Injury or Date of Surgery 10/15/23   Referring Physician Karri Kumar, SANGEETA   Patient/Family Therapy Goals Statement (PT) to return home   Pertinent History of Current Problem (include personal factors and/or comorbidities that impact the POC) Per H and P:\"Quan Murphy is a 73 year old male admitted to [facility] on October 15 for gallstone pancreatitis. Pt was recently hospitalized from 10/7-10/10/23 . Quan Murphy is a 73 year old male with metastatic squamous cell carcinoma of tonsils, s/p chemoradiation, recent ablation of liver metastasis on 9/19/2023, CAD, hypertension, hyperlipidemia  He presented to [facility] ER on 9/27 with fever and abdominal pain.  Ultrasound showed acute cholecystitis with cholelithiasis.  He declined admission and left AMA.  Blood culture grew Enterococcus hirae sensitive to ampicillin.  He did not receive antibiotics for this.  Multiple attempts were made to contact the patient but " "were unsuccessful.  His abdominal pain subsequently resolved.  He presented to [facility] ER again on 10/7 with 1 week of diarrhea, nausea, anorexia, poor oral intake 10 pound weight loss in 1 week generalized weakness, dizziness and recurrent falls.    Work-up showed severe hypokalemia with potassium of 2.2 acute kidney injury with creatinine of 2.5.  There was concern that his symptoms are likely related to acute cholecystitis with cholelithiasis.  He was felt to be a high risk patient and was thus transferred to [facility] for general surgery evaluation. \"   Existing Precautions/Restrictions fall;abdominal   Cognition   Affect/Mental Status (Cognition) WFL   Orientation Status (Cognition) oriented x 3   Follows Commands (Cognition) WFL   Pain Assessment   Patient Currently in Pain Yes, see Vital Sign flowsheet  (5-6/10 at rest)   Posture    Posture Forward head position   Range of Motion (ROM)   Range of Motion ROM is WFL   Strength (Manual Muscle Testing)   Strength (Manual Muscle Testing) Able to perform R SLR;Able to perform L SLR;Deficits observed during functional mobility   Strength Comments decreased functional LE strength and endurance   Bed Mobility   Bed Mobility supine-sit   Comment, (Bed Mobility) SBA via logroll   Transfers   Transfers sit-stand transfer   Comment, (Transfers) CGA to FWW   Gait/Stairs (Locomotion)   Comment, (Gait/Stairs) Pt amb 5 ft with FWW and CGA, slow pace, steady   Balance   Balance Comments currently benefits from use of FWW for mobility   Sensory Examination   Sensory Perception patient reports no sensory changes   Clinical Impression   Criteria for Skilled Therapeutic Intervention Yes, treatment indicated   PT Diagnosis (PT) impaired IND with functional mobilityt   Influenced by the following impairments impaired functional strength, activity tolerance, balance, pain, abd precautions   Functional limitations due to impairments impaired bed mobility, " transfers, ambulation   Clinical Presentation (PT Evaluation Complexity) stable   Clinical Presentation Rationale Based on current presentation, PMH, social support   Clinical Decision Making (Complexity) low complexity   Planned Therapy Interventions (PT) balance training;bed mobility training;gait training;home exercise program;neuromuscular re-education;stair training;ROM (range of motion);strengthening;stretching;transfer training;progressive activity/exercise;patient/family education   Risk & Benefits of therapy have been explained evaluation/treatment results reviewed;care plan/treatment goals reviewed;risks/benefits reviewed;current/potential barriers reviewed;participants included;participants voiced agreement with care plan;patient   PT Total Evaluation Time   PT Eval, Low Complexity Minutes (77095) 10   Physical Therapy Goals   PT Frequency Daily   PT Predicted Duration/Target Date for Goal Attainment 10/26/23   PT Goals Bed Mobility;Transfers;Gait;Stairs   PT: Bed Mobility Independent;Supine to/from sit;Within precautions   PT: Transfers Modified independent;Sit to/from stand;Assistive device;Within precautions   PT: Gait Modified independent;Rolling walker;150 feet;Within precautions   PT: Stairs Supervision/stand-by assist;6 stairs;Rail on right;Within precautions   Therapeutic Activity   Therapeutic Activities: dynamic activities to improve functional performance Minutes (30069) 8   Symptoms Noted During/After Treatment Fatigue;Increased pain   Treatment Detail/Skilled Intervention Pt edu on abdominal precautions, use of logroll for bed mobility. Pt with youth size walker in room, time spent to get appropriate size FWW for pt, pt edu on proper fit. Pt completed sit<>stands throughout session with SBA, does need cues at times for safe hand placement. Sit>sup with SBA via logroll. Pt encouraged to mobilize with nursing staff later today. Pt remained supine with all needs in reach, alarm armed   Gait  Training   Gait Training Minutes (70498) 12   Symptoms Noted During/After Treatment (Gait Training) fatigue;increased pain   Treatment Detail/Skilled Intervention Pt cued for gait with FWW, progressing to SBA with further distance, 250 ft. Slow pace but steady throughout. Cued for upright posture, safe walker proximity. Pt reports fatigue in LEs following.   PT Discharge Planning   PT Plan trial gait with no AD, stairs   PT Discharge Recommendation (DC Rec) home with assist;home with outpatient physical therapy   PT Rationale for DC Rec Pt currently below reported IND baseline for mobility. Able to ambulate 250 ft with SBA at FWW today. Rec home with assist of spouse, use of FWW, HHPT to address continued deficits.   PT Brief overview of current status SBA with FWW   Total Session Time   Timed Code Treatment Minutes 20   Total Session Time (sum of timed and untimed services) 30

## 2023-10-19 NOTE — PROGRESS NOTES
10/19/23 0815   Appointment Info   Signing Clinician's Name / Credentials (OT) Mukund Loza, OTR/L   Living Environment   People in Home spouse   Current Living Arrangements condominium   Home Accessibility stairs to enter home;stairs within home   Number of Stairs, Main Entrance 6   Stair Railings, Main Entrance railings safe and in good condition   Number of Stairs, Within Home, Primary seven  (7 up and 7 down)   Stair Railings, Within Home, Primary railings safe and in good condition   Transportation Anticipated family or friend will provide   Living Environment Comments Pt lives in split level home w/ spouse. Pt reports not needing to go downstairs. Tub shower w/ grab bars and shower chair.   Self-Care   Usual Activity Tolerance good   Current Activity Tolerance moderate   Equipment Currently Used at Home none   Fall history within last six months yes   Number of times patient has fallen within last six months 2   Activity/Exercise/Self-Care Comment Pt reports being IND w/ all ADL's at baseline prior to admission.   Instrumental Activities of Daily Living (IADL)   IADL Comments Spouse completes all IADL's at baseline. Spouse assists w/ med mgmt and finances.   General Information   Onset of Illness/Injury or Date of Surgery 10/15/23   Referring Physician Karri Kumar PA-C   Patient/Family Therapy Goal Statement (OT) Return to home today.   Additional Occupational Profile Info/Pertinent History of Current Problem Quan Murphy is a 73 year old male admitted to [facility] on October 15 for gallstone pancreatitis.   Existing Precautions/Restrictions fall   Cognitive Status Examination   Orientation Status orientation to person, place and time   Visual Perception   Visual Impairment/Limitations corrective lenses for reading   Sensory   Sensory Quick Adds sensation intact   Pain Assessment   Patient Currently in Pain No   Range of Motion Comprehensive   General Range of Motion no range of motion  deficits identified   Strength Comprehensive (MMT)   Comment, General Manual Muscle Testing (MMT) Assessment Generalized weakness bilaterally   Bed Mobility   Bed Mobility sit-supine   Comment (Bed Mobility) SBA   Transfers   Transfers sit-stand transfer;toilet transfer   Sit-Stand Transfer   Assistive Device (Sit-Stand Transfers) walker, front-wheeled   Sit/Stand Transfer Comments SBA   Toilet Transfer   Type (Toilet Transfer) stand-sit;sit-stand   Assistive Device (Toilet Transfer) walker, front-wheeled   Toilet Transfer Comments SBA   Lower Body Dressing Assessment/Training   Curry Level (Lower Body Dressing) supervision   Grooming Assessment/Training   Curry Level (Grooming)   (SBA)   Toileting   Assistive Devices (Toileting)   (SBA)   Clinical Impression   Criteria for Skilled Therapeutic Interventions Met (OT) Yes, treatment indicated   OT Diagnosis Decreased ADL independence and tolerance   Influenced by the following impairments Decreased ADL independence   OT Problem List-Impairments impacting ADL problems related to;activity tolerance impaired;cognition;strength;mobility   Assessment of Occupational Performance 3-5 Performance Deficits   Identified Performance Deficits dressing, toileting, bathing, standing ADL's, mobility   Planned Therapy Interventions (OT) ADL retraining;cognition;strengthening;home program guidelines;progressive activity/exercise;risk factor education   Clinical Decision Making Complexity (OT) problem focused assessment/low complexity   Risk & Benefits of therapy have been explained evaluation/treatment results reviewed;care plan/treatment goals reviewed;risks/benefits reviewed;current/potential barriers reviewed;patient   OT Total Evaluation Time   OT Eval, Low Complexity Minutes (63400) 10   OT Goals   Therapy Frequency (OT) 4 times/week   OT Predicted Duration/Target Date for Goal Attainment 10/24/23   OT: Hygiene/Grooming independent;while standing   OT: Lower Body  Dressing Independent;including set-up/clothing retrieval   OT: Toilet Transfer/Toileting Independent;toilet transfer;cleaning and garment management   OT: Cognitive Patient/caregiver will verbalize understanding of cognitive assessment results/recommendations as needed for safe discharge planning   Self-Care/Home Management   Self-Care/Home Mgmt/ADL, Compensatory, Meal Prep Minutes (79894) 24   Symptoms Noted During/After Treatment (Meal Preparation/Planning Training) fatigue   Treatment Detail/Skilled Intervention Pt greeted up in bathroom w/ nursing assistant; agreeable for OT evaluation. Direct handoff provided. Pt able to complete toilet transfer w/ SBA; pt voided and completed mago care w/ supervision. Following completion, pt stood sinkside to complete 1 g/h task w/ SBA for safety. Pt returned to room w/ use of FWW and SBA; pt returned seated edge of bed. Pt able to demonstrate doffing/donning B socks w/ supervision. Pt then requesting to ambulate within hallway. Educated pt regarding PLB prior to ambulation and pt was able to demonstrate understanding. SBA sit > stand edge of bed w/ cues for safe hand placement. Pt ambulated approx. 50 feet in hallway w/ SBA and assist for line management. Pt able to demonstrate PLB throughout. Pt returned to room and seated EOB. SBA sit > sup w/ bed flat; pt able to boost self up. Pt remained supine in bed at end of therapy session w/ all needs met. RN notified.   OT Discharge Planning   OT Plan Light home mgmt tasks, 2-3 step sequencing task, increase activity tolerance   OT Discharge Recommendation (DC Rec) home with assist;home with home care occupational therapy   OT Rationale for DC Rec Pt currently below baseline d/t decreased activity tolerance. Pt recently discharged home w/ assist from spouse but readmitted d/t new diagnosis. Pt reports that they have been getting stronger lately and have no concern about returning home w/ assist from spouse. Anticipate once  medically ready pt will be safe to dischage home.   OT Brief overview of current status SBA bed mobility and transfers w/ FWW, Supervision LB dressing, SBA toileting   Total Session Time   Timed Code Treatment Minutes 24   Total Session Time (sum of timed and untimed services) 34

## 2023-10-19 NOTE — PROGRESS NOTES
[facility]  GENERAL SURGERY Progress Note    Admission Date: 10/15/2023  10/19/2023         Assessment and Plan:     Quan Murphy is a 73 year old male admitted with gallstone pancreatitis.  3 days s/p ERCP with acquired duodenal stenosis. Successful removal of choledocholithiasis. The biliary tree was swept and pus was found. One temporary CBD stent.  1 day s/p laparoscopic cholecystectomy.     - ADAT. Senokot BID prn.   - Transition to oral analgesia. Patient with chronic pain on 1-2 tablets every 6 hours as needed. Added robaxin for better pain control.   - Agree with 1 week of antibiotics given purulence visualized during ERCP.  - Ambulate 4x day and encourage IS    DISPOSITION:  - Doing well from our standpoint. Will follow peripherally and our discharge orders are in place. Robaxin and senokot sent. Just had refill of oxycodone from PCP on 10/3.  - Telephone follow up with our team in 2-3 weeks. Call sooner if questions or concerns.  - Follow up with GI in 1-2 weeks and removal of CBD stent in 3 months. Liver lesion imaging in 6 months.             Interval History:     Quan Murphy is seen on surgical rounds. He states he is doing well this morning. He is sore at incisions but otherwise no complaints. Off of dilaudid for pain now, worst pain at fascial closure site. Tolerating full liquids. Ambulating hays with walker. Urinating without difficulty. Two BM's yesterday. Hypertensive, vitals otherwise wnl. WBC wnl and Hgb stable.                     Physical Exam:   Blood pressure (!) 148/82, pulse 61, temperature 97.5  F (36.4  C), temperature source Oral, resp. rate 16, weight 85.9 kg (189 lb 6.4 oz), SpO2 99%.  Temperature Temp  Av  F (36.7  C)  Min: 97.4  F (36.3  C)  Max: 98.8  F (37.1  C)   I/O last 3 completed shifts:  In: 800 [I.V.:800]  Out: 865 [Urine:850; Blood:15]    GENERAL: VS reviewed, alert, oriented, no acute distress  LUNGS: Normal respiratory effort, no  wheezing  ABDOMEN:  Soft, appropriately tender, non-distended  INCISION: Steris and band-aids in place. Clean, dry, and intact. No surrounding erythema.  EXTREMITIES: Moving all extremities, no gross deformities  NEUROLOGICAL: Grossly non-focal, mood & affect appropriate          Data:     Recent Labs   Lab Test 10/19/23  0553 10/18/23  0338 10/17/23  0959   WBC 5.3 6.4 6.5   HGB 9.8* 9.9* 10.4*   HCT 29.1* 28.8* 30.9*    183 218      Recent Labs   Lab Test 10/19/23  0553 10/18/23  1543 10/18/23  0338 10/17/23  2130 10/17/23  0959     --  143  --  144   POTASSIUM 3.6  3.6 3.4 3.2*  3.2*   < > 3.0*   CHLORIDE 112*  --  116*  --  119*   CO2 16*  --  17*  --  15*   BUN 6.8*  --  8.8  --  12.2   CR 1.32*  --  1.46*  --  1.51*    < > = values in this interval not displayed.     Recent Labs   Lab Test 10/19/23  0553 10/17/23  0959 10/16/23  0622 10/15/23  0504 10/14/23  2155 10/08/23  0720 10/07/23  0953 09/27/23  1245 05/15/23  1342 05/08/23  1329   BILITOTAL 0.6 0.5 0.6   < > 0.6   < > 0.5 1.0   < > 0.4   DBIL  --   --   --   --   --   --  <0.20 0.42*  --  <0.20   ALT 19 13 14   < > 19   < > 43 47   < > 41   AST 33 18 19   < > 25   < > 49* 37   < > 33   ALKPHOS 150* 184* 194*   < > 301*   < > 254* 238*   < > 63   LIPASE  --  92*  --   --  1,943*  --  102*  --    < >  --     < > = values in this interval not displayed.       Marii Inman PA-C  [facility]  [phone]

## 2023-10-19 NOTE — PROGRESS NOTES
[facility]    Medicine Progress Note - Hospitalist Service    Date of Admission:  10/15/2023    Assessment & Plan   Quan Murphy is a 73 year old male admitted to [facility] on October 15 for gallstone pancreatitis. Pt was recently hospitalized from 10/7-10/10/23 . Quan Murphy is a 73 year old male with metastatic squamous cell carcinoma of tonsils, s/p chemoradiation, recent ablation of liver metastasis on 9/19/2023, CAD, hypertension, hyperlipidemia  He presented to [facility] ER on 9/27 with fever and abdominal pain.  Ultrasound showed acute cholecystitis with cholelithiasis.  He declined admission and left AMA.  Blood culture grew Enterococcus hirae sensitive to ampicillin.  He did not receive antibiotics for this.  Multiple attempts were made to contact the patient but were unsuccessful.  His abdominal pain subsequently resolved.  He presented to [facility] ER again on 10/7 with 1 week of diarrhea, nausea, anorexia, poor oral intake 10 pound weight loss in 1 week generalized weakness, dizziness and recurrent falls.    Work-up showed severe hypokalemia with potassium of 2.2 acute kidney injury with creatinine of 2.5.  There was concern that his symptoms are likely related to acute cholecystitis with cholelithiasis.  He was felt to be a high risk patient and was thus transferred to [facility] for general surgery evaluation. Consulted general surgery. HIDA scan was Negative for acute cholecystitis and biliary dyskinesia. No surgery recommended. Symptomatic treatment.Patient presented with ongoing abdominal pain, N/V and unable to eat due to pain and was only drinking sprite PTA      Choledocholithiasis status post ERCP and sphincterotomy, stone removal and stent placement by GI with Dr. Blood  on 10/16/2023  Possible acute cholangitis with first seen during ERCP and CBD  Gallstone pancreatitis s/p lap-choli on 10/18.  Acute metabolic encephalopathy.  Resolved.  Patient presented to the emergency room with a 5-day history of nausea, vomiting, abdominal pain.  Blood work was significant for an ALP elevation and lipase elevation.  CT scan revealed cholelithiasis with a density in the region of the distal common bile duct near the ampulla most likely representing choledocholithiasis.  Peripancreatic edema was also seen consistent with pancreatitis.  The patient has been having ongoing issues with acute cholecystitis and cholelithiasis since September.   performed EUS and  ERCP, sphincterotomy, stone removal and stent placement on 10/16/2023  Pus was noted in the CBD with during the stone removal.  S/p laparoscopic cholecystectomy on 10/18.  Improving abd pain.  Reports BM this am.  - Cont PPI.   - Pain control (change to PO today).  - Cont Zosyn (total of 1 week of abx- change to Ceftin and Flagyl today, 10/19 ).  - Appreciate GI/surgery assistance).      Enterococcal bacteremia prior to admission       1/2 bottles positive on 9/27 at OSH - patient refused treatment and left.   Repeat blood cultures remain negative   He was treated with Unasyn and discharged on Augmentin.  Blood cultures repeated remain negative so far  On IV zosyn now-->cipro/flagyl today.    Severe hypokalemia at 1.6 improved   Likely due to GI losses from his vomiting.  Asymptomatic with generalized weakness and EKG changes.  Potassium has significantly improved with replacement and last check was 2.8 prior to transfer; however I believe that this is an erroneous lab value as the patient had only received 30 mill equivalents of potassium prior to rechecking. 3.6 this am.  - Replace as indicated.  - Monitor levels.      Metabolic acidosis with bicarb of 9:  Lactate normal  Ketones normal   Hydration with bicarb IVF.  Follow BMP closely   Bicarb improved from 9-16 on 10/19/2023    SURESH  Creatinine was 1.09 on September 27, admission creatinine is 1.98.  Presumably hypovolemic secondary to  his nausea, and vomiting.  Improved to 1.46--1.32 this am.  -Hydration.  -Monitor renal function  -Avoid nephrotoxic medications    Metastatic squamous cell carcinoma of the tonsils status post chemoradiation  Mets to the liver status post ablation on September 19, 2023  -Follows at the [facility]  .  Coronary artery disease  Hypertension  Dyslipidemia  Chest pain-free on exam.  EKG is abnormal with changes consistent of hypokalemia as mentioned above.  -Resumed PTA metoprolol and Crestor              Diet: Full Liquid Diet    DVT Prophylaxis: Pneumatic Compression Devices  Stone Catheter: Not present  Lines: None     Cardiac Monitoring: None  Code Status: Full Code      Disp: 1-2 days pending pain control, therapies.  Pain meds/abx to PO today.    Edwin Langston MD  Hospitalist Service  [facility]    ______________________________________________________________________    Interval History   Intermittent abd pain. Reports BM this am.  No n/v.  No cp/sob.    Physical Exam   Vital Signs: Temp: 97.5  F (36.4  C) Temp src: Oral BP: (!) 148/82 Pulse: 61   Resp: 16 SpO2: 99 % O2 Device: None (Room air) Oxygen Delivery: 2 LPM  Weight: 189 lbs 6.4 oz    General Appearance: Alert awake, not in acute distress  Respiratory: Clear to auscultation bilaterally, diminished in the bases  Cardiovascular: Normal rate rhythm regular  GI: Right upper quadrant and epigastric tenderness present, abdomen nondistended soft, bowel sounds positive no guarding rigidity or rebound noted  Skin: Warm and dry  Psychiatric mood and affect are normal  Neurological; able to move all extremities      Data     I have personally reviewed the following data over the past 24 hrs:    5.3  \   9.8 (L)   / 162     139 112 (H) 6.8 (L) /  99   3.6; 3.6 16 (L) 1.32 (H) \     ALT: 19 AST: 33 AP: 150 (H) TBILI: 0.6   ALB: 2.4 (L) TOT PROTEIN: 5.1 (L) LIPASE: N/A       Imaging results reviewed over the past 24 hrs:   No results found  for this or any previous visit (from the past 24 hour(s)).       Repair Performed By Another Provider Text (Leave Blank If You Do Not Want): After the tissue was excised the defect was repaired by another provider.

## 2023-10-19 NOTE — PROVIDER NOTIFICATION
MD Notification    Notified Person: MD    Notified Person Name: Ish    Notification Date/Time: 10/19/23 1:55 AM    Notification Interaction: Vocera    Purpose of Notification: Pt requesting PRN oxycodone dose to be increased from 5mg to 10mg.     Orders Received: PRN dose increased to 10mg    Comments:

## 2023-10-19 NOTE — PLAN OF CARE
Goal Outcome Evaluation:       0700-1900  Orientation: A&Ox4  Activity: Ax1 GB+W, ambulated well today  Diet/BS Checks: low fat diet  Tele: N/A  IV Access/Drains: R PIV infusing Sodium Bicarb in D5+1/2 NS @ 75mL/hr w/ int abx  Pain Management: 10mg PO oxycodone x2. Effective   Abnormal VS/Results: Cr: 1.32, HGB 9.8, BG: WNL  Bowel/Bladder: Continent of B/B, had bm at start of shift  Skin/Wounds: 4 lap sites WDL  Consults: PT/OT, Surg  D/C Disposition: Plan pending. Possible discharge today.PT/Ot saw pt.  Other Info: Nausea, Compazine given x1

## 2023-10-19 NOTE — DISCHARGE SUMMARY
Discharge Summary    Quan Murphy MRN# [id number]   YOB: 1950 Age: 73 year old     Date of Admission:  10/15/2023  Date of Discharge:  10/19/2023  Admitting Physician:  Rashid Agustin MD  Discharge Physician:  Edwin Langston MD Pager [phone] Office [phone]  Discharging Service:  [facility]     Primary Provider: Monico Rivers          Discharge Diagnosis:     See problem-oriented hospital course for diagnoses addressed during this hospital stay.             Discharge Disposition:     Discharged to home          Condition on Discharge:     Discharge condition: Stable   Discharge vitals: Blood pressure (!) 148/82, pulse 61, temperature 97.5  F (36.4  C), temperature source Oral, resp. rate 16, weight 85.9 kg (189 lb 6.4 oz), SpO2 99%.   Code status on discharge: Full Code          Discharge Medications:        Review of your medicines        START taking        Dose / Directions   ciprofloxacin 500 MG tablet  Commonly known as: CIPRO  Indication: cholangitis  Used for: Choledocholithiasis      Dose: 500 mg  Take 1 tablet (500 mg) by mouth every 12 hours  Quantity: 6 tablet  Refills: 0     methocarbamol 500 MG tablet  Commonly known as: ROBAXIN  Used for: Postoperative pain      Dose: 500 mg  Take 1 tablet (500 mg) by mouth 4 times daily for 5 days  Quantity: 20 tablet  Refills: 0     metroNIDAZOLE 500 MG tablet  Commonly known as: FLAGYL  Indication: cholangitis  Used for: Choledocholithiasis      Dose: 500 mg  Take 1 tablet (500 mg) by mouth 2 times daily  Quantity: 6 tablet  Refills: 0     senna-docusate 8.6-50 MG tablet  Commonly known as: SENOKOT-S/PERICOLACE  Used for: Acute cholecystitis      Dose: 1 tablet  Take 1 tablet by mouth 2 times daily as needed for constipation  Quantity: 10 tablet  Refills: 0     sennosides 8.6 MG tablet  Commonly known as: SENOKOT  Used for: Postoperative pain      Dose: 1-2 tablet  Take 1-2 tablets by mouth 2 times daily as  needed for constipation  Quantity: 30 tablet  Refills: 0            CONTINUE these medicines which may have CHANGED, or have new prescriptions. If we are uncertain of the size of tablets/capsules you have at home, strength may be listed as something that might have changed.        Dose / Directions   * oxyCODONE 5 MG tablet  Commonly known as: ROXICODONE  This may have changed: Another medication with the same name was added. Make sure you understand how and when to take each.  Used for: 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), Osteoarthritis of right foot, unspecified osteoarthritis type      TAKE 1-2 TABLETS (5-10 MG) BY MOUTH EVERY 6 HOURS AS NEEDED FOR SEVERE PAIN (LIMIT TO 6 TABLETS PER DAY)  Quantity: 120 tablet  Refills: 0     * oxyCODONE 5 MG tablet  Commonly known as: ROXICODONE  This may have changed: You were already taking a medication with the same name, and this prescription was added. Make sure you understand how and when to take each.  Used for: Acute cholecystitis      Dose: 5 mg  Take 1 tablet (5 mg) by mouth every 6 hours as needed for pain  Quantity: 8 tablet  Refills: 0     * oxyCODONE 5 MG tablet  Commonly known as: ROXICODONE  This may have changed: You were already taking a medication with the same name, and this prescription was added. Make sure you understand how and when to take each.  Used for: Choledocholithiasis      Dose: 5 mg  Take 1 tablet (5 mg) by mouth every 6 hours as needed for moderate pain  Quantity: 20 tablet  Refills: 0           * This list has 3 medication(s) that are the same as other medications prescribed for you. Read the directions carefully, and ask your doctor or other care provider to review them with you.                CONTINUE these medicines which have NOT CHANGED        Dose / Directions   amoxicillin 875 MG tablet  Commonly known as: AMOXIL  Used for: Bacteremia      Dose: 875 mg  Take 1 tablet (875 mg) by mouth 2 times daily for 10 days  Quantity: 20  tablet  Refills: 0     cetirizine 10 MG tablet  Commonly known as: zyrTEC  Used for: Chronic maxillary sinusitis      Dose: 10 mg  Take 1 tablet (10 mg) by mouth daily  Quantity: 30 tablet  Refills: 1     citalopram 40 MG tablet  Commonly known as: celeXA  Used for: Anxiety      Dose: 40 mg  TAKE 1 TABLET BY MOUTH ONCE DAILY  Quantity: 90 tablet  Refills: 0     DULoxetine 60 MG capsule  Commonly known as: CYMBALTA  Used for: Osteoarthritis of right foot, unspecified osteoarthritis type      Dose: 60 mg  Take 1 capsule (60 mg) by mouth daily  Quantity: 90 capsule  Refills: 3     famotidine 20 MG tablet  Commonly known as: PEPCID  Indication: Gastroesophageal Reflux Disease      Dose: 20 mg  Take 20 mg by mouth 2 times daily as needed (heartburn)  Refills: 0     levothyroxine 100 MCG tablet  Commonly known as: SYNTHROID/LEVOTHROID      Dose: 100 mcg  Take 100 mcg by mouth daily  Refills: 0     metoprolol succinate ER 50 MG 24 hr tablet  Commonly known as: TOPROL XL  Used for: Benign essential hypertension      Dose: 25 mg  Take 0.5 tablets (25 mg) by mouth daily  Quantity: 90 tablet  Refills: 3     naloxone 4 MG/0.1ML nasal spray  Commonly known as: NARCAN  Used for: Osteoarthritis of right foot, unspecified osteoarthritis type, 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)      Dose: 4 mg  Spray 1 spray (4 mg) into one nostril alternating nostrils as needed for opioid reversal every 2-3 minutes until assistance arrives  Quantity: 0.2 mL  Refills: 0     nitroGLYcerin 0.4 MG sublingual tablet  Commonly known as: NITROSTAT  Used for: Atherosclerosis of native coronary artery of native heart with unstable angina pectoris (H)      For chest pain place 1 tablet under the tongue every 5 minutes for 3 doses. If symptoms persist 5 minutes after 1st dose call 911.  Quantity: 25 tablet  Refills: 0     omeprazole 40 MG DR capsule  Commonly known as: PriLOSEC  Used for: Gastroesophageal reflux disease with esophagitis, unspecified  whether hemorrhage      Dose: 40 mg  Take 1 capsule (40 mg) by mouth daily as needed (heartburn)  Refills: 0     ondansetron 4 MG ODT tab  Commonly known as: ZOFRAN ODT  Used for: 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), Osteoarthritis of right foot, unspecified osteoarthritis type      DISSOLVE 1 TABLET (4 MG) BY MOUTH EVERY 8 HOURS AS NEEDED FOR NAUSEA  Quantity: 10 tablet  Refills: 0     pregabalin 150 MG capsule  Commonly known as: LYRICA  Used for: Osteoarthritis of right foot, unspecified osteoarthritis type      Dose: 150 mg  Take 1 capsule (150 mg) by mouth daily  Quantity: 120 capsule  Refills: 1     rosuvastatin 40 MG tablet  Commonly known as: CRESTOR  Used for: Hyperlipidemia LDL goal <130      Dose: 40 mg  Take 1 tablet (40 mg) by mouth daily  Quantity: 90 tablet  Refills: 3     zolpidem 5 MG tablet  Commonly known as: AMBIEN  Used for: 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, insomnia type      Dose: 5 mg  TAKE 1 TABLET (5 MG) BY MOUTH NIGHTLY AS NEEDED FOR SLEEP  Quantity: 30 tablet  Refills: 0               Where to get your medicines        These medications were sent to [facility] [place], [place] - [address] [address]  [address] [place] [id number]      Phone: [phone]   ciprofloxacin 500 MG tablet  methocarbamol 500 MG tablet  metroNIDAZOLE 500 MG tablet  oxyCODONE 5 MG tablet  senna-docusate 8.6-50 MG tablet  sennosides 8.6 MG tablet       Some of these will need a paper prescription and others can be bought over the counter. Ask your nurse if you have questions.    Bring a paper prescription for each of these medications  oxyCODONE 5 MG tablet                Consultations:     GI  Surgery.             Brief Hx and Hospital Course:     Quan Murphy is a 73 year old male admitted to [facility] on October 15 for gallstone pancreatitis. Pt was recently hospitalized from 10/7-10/10/23 . Quan Murphy is a 73 year old male with metastatic  squamous cell carcinoma of tonsils, s/p chemoradiation, recent ablation of liver metastasis on 9/19/2023, CAD, hypertension, hyperlipidemia  He presented to [facility] ER on 9/27 with fever and abdominal pain.  Ultrasound showed acute cholecystitis with cholelithiasis.  He declined admission and left AMA.  Blood culture grew Enterococcus hirae sensitive to ampicillin.  He did not receive antibiotics for this.  Multiple attempts were made to contact the patient but were unsuccessful.  His abdominal pain subsequently resolved.  He presented to [facility] ER again on 10/7 with 1 week of diarrhea, nausea, anorexia, poor oral intake 10 pound weight loss in 1 week generalized weakness, dizziness and recurrent falls.    Work-up showed severe hypokalemia with potassium of 2.2 acute kidney injury with creatinine of 2.5.  There was concern that his symptoms are likely related to acute cholecystitis with cholelithiasis.  He was felt to be a high risk patient and was thus transferred to [facility] for general surgery evaluation. Consulted general surgery. HIDA scan was Negative for acute cholecystitis and biliary dyskinesia. No surgery recommended. Symptomatic treatment.Patient presented with ongoing abdominal pain, N/V and unable to eat due to pain and was only drinking sprite PTA       Choledocholithiasis status post ERCP and sphincterotomy, stone removal and stent placement by GI with Dr. Blood  on 10/16/2023  Possible acute cholangitis with first seen during ERCP and CBD  Gallstone pancreatitis s/p lap-choli on 10/18.  Acute metabolic encephalopathy. Resolved.  Patient presented to the emergency room with a 5-day history of nausea, vomiting, abdominal pain.  Blood work was significant for an ALP elevation and lipase elevation.  CT scan revealed cholelithiasis with a density in the region of the distal common bile duct near the ampulla most likely representing choledocholithiasis.  Peripancreatic edema was  also seen consistent with pancreatitis.  The patient has been having ongoing issues with acute cholecystitis and cholelithiasis since September.   performed EUS and  ERCP, sphincterotomy, stone removal and stent placement on 10/16/2023  Pus was noted in the CBD with during the stone removal.  S/p laparoscopic cholecystectomy on 10/18.  Improving abd pain.  Reports BM this am.  - Cont PPI.   - Pain control (change to PO today).  - Has been on zosyn, transitioned to cipro/flagyl at discharge.   - Follow up with GI/surgery.        Enterococcal bacteremia prior to admission       Per chart refuced treatment in sept.  Repeat blood cultures remain negative   He was treated with Unasyn and discharged on Augmentin.  Blood cultures repeated remain negative so far   IV zosyn-->cipro/flagyl at discharge.     Severe hypokalemia at 1.6 improved   Likely due to GI losses from his vomiting.  Asymptomatic with generalized weakness and EKG changes.  Potassium has significantly improved with replacement and last check was 2.8 prior to transfer; however I believe that this is an erroneous lab value as the patient had only received 30 mill equivalents of potassium prior to rechecking. 3.6 this am.  - BMP next week.        Metabolic acidosis with bicarb of 9:  Lactate normal  Ketones normal   Bicarb improved from 9-16 on 10/19/2023  Hydration.  BMP next week,     SURESH  Creatinine was 1.09 on September 27, admission creatinine is 1.98.  Presumably hypovolemic secondary to his nausea, and vomiting.  Improved to 1.46--1.32 this am.  -Hydration.  -Avoid nephrotoxic medications  - BMP next week.     Metastatic squamous cell carcinoma of the tonsils status post chemoradiation  Mets to the liver status post ablation on September 19, 2023  -Follows at the [facility]  .  Coronary artery disease  Hypertension  Dyslipidemia  Chest pain-free on exam.  EKG is abnormal with changes consistent of hypokalemia as mentioned above.  -Resumed PTA  metoprolol and Crestor   - Outpatient follow up.    .    He is being discharged home in stable conditions.  Offered to keep him till tomorrow to watch electrolyts but pt wanted to be discharged.                DISCHARGE EXAM:   Temp:  [97.4  F (36.3  C)-98.4  F (36.9  C)] 97.5  F (36.4  C)  Pulse:  [54-65] 61  Resp:  [12-16] 16  BP: (133-167)/(70-94) 148/82  SpO2:  [95 %-99 %] 99 %  Wt Readings from Last 5 Encounters:   10/19/23 85.9 kg (189 lb 6.4 oz)   10/14/23 81.2 kg (179 lb)   10/10/23 83 kg (183 lb)   10/07/23 82.1 kg (181 lb)   08/02/23 91.6 kg (202 lb)              Pertinent Tests and Procedures:     Cholecystectomy.             Pending Results:     None.          Discharge Instructions and Follow-Up:     Discharge diet: Orders Placed This Encounter      Diet      Low Fat Diet (up to 50g)      Diet     Discharge activity: Activity as tolerated   Discharge follow-up: Follow up with primary care provider, GI, surgery.   Outpatient therapy: None    Home Care agency: None    Supplies and equipment: None   Lines and drains: None    Wound care: None   Other instructions: None      More than 30 minutes were required for coordination of care for this discharge.         Procedures / Labs / Imaging:     Results for orders placed or performed during the hospital encounter of 10/15/23   UPPER EUS     Status: None   Result Value Ref Range    Upper EUS       [address] [address] [address]  [id number]  _______________________________________________________________________________  Patient Name: Quan Murphy           Procedure Date: 10/16/2023 1:43 PM  MRN: [id number]                       Account Number: [id number]  YOB: 1950              Admit Type: Inpatient  Age: 73                               Room: [address] [address]  Note Status: Finalized                Attending MD: JOSI BENNETT MD,   Instrument Name: 533 UN--ZK7 Radial    _______________________________________________________________________________     Procedure:                Upper EUS  Indications:              Common bile duct dilation (acquired) seen on CT                             scan, Dilated pancreatic duct on CT scan, Acute                             pancreatitis  Providers:                JOSI BENNETT MD, Sanam Bonner RN  Referring MD:             CHUCHO GAN,   Medicines:                General Anes thesia  Complications:            No immediate complications.  _______________________________________________________________________________  Procedure:                Pre-Anesthesia Assessment:                            - Prior to the procedure, a History and Physical                             was performed, and patient medications and                             allergies were reviewed. The patient is competent.                             The risks and benefits of the procedure and the                             sedation options and risks were discussed with the                             patient. All questions were answered and informed                             consent was obtained. Patient identification and                             proposed procedure were verified by the physician.                             Mental Status Examination: normal. Prophylactic                             Antibiotics: The patient does not require                             prophylactic a ntibiotics. Prior Anticoagulants: The                             patient has taken no anticoagulant or antiplatelet                             agents. After reviewing the risks and benefits, the                             patient was deemed in satisfactory condition to                             undergo the procedure. The anesthesia plan was to                             use minimal sedation / analgesia (anxiolysis).                             Immediately  prior to administration of medications,                             the patient was re-assessed for adequacy to receive                             sedatives. The heart rate, respiratory rate, oxygen                             saturations, blood pressure, adequacy of pulmonary                             ventilation, and response to care were monitored                             throughout the procedure. The physical status of                             the patient was re-assessed after the procedure.                             After obtaining informed consent, the endoscope was                             passed under direct vision. Throughout the                             procedure, the patient's blood pressure, pulse, and                             oxygen saturations were monitored continuously. The                             Endosonoscope was introduced through the mouth, and                             advanced to the duodenal bulb. The upper EUS was                             accomplished without difficulty. The patient                             tolerated the procedure well.                                                                                   Findings:       ENDOSONOGRAPHIC FINDING: :       There was no sign of significant endosonographic abnormality in the        esophagus.       Endosonographic images of the stomach were unremarkable.       There was no sign of significant endosonographic abnormality in the        duodenal bulb.       One stone was visualized endosonographically  in the common bile duct and        in the lower third of the main bile duct. The stone measured 6 mm in        greatest dimension. It was hyperechoic.       Moderate hyperechoic material consistent with sludge was visualized        endosonographically in the common bile duct.       There was dilation in the common bile duct which measured up to 10 mm.        CBD wall appears thickened.       There was no sign  of significant endosonographic abnormality in the        entire pancreas. The pancreatic duct measured up to 3 mm in diameter.                                                                                   Impression:               - There was no sign of significant pathology in the                             esophagus.                            - Endosonographic images of the stomach were                             unremarkable.                            - There was no sign of significant pathology in the                             duodenal bulb.                             - One stone was visualized endosonographically in                             the common bile duct and in the lower third of the                             main bile duct.                            - Hyperechoic material consistent with sludge was                             visualized endosonographically in the common bile                             duct.                            - There was dilation in the common bile duct which                             measured up to 10 mm.                            - There was no sign of significant pathology in the                             entire pancreas.                            - No specimens collected.  Recommendation:           - Perform an ERCP.                                                                                   Procedure Code(s):       --- Professional ---       76722, Esophagogastroduodenoscopy, flexible, transoral; with endoscopic        ultrasound examination, including the esophagus, stomach, and either th e        duodenum or a surgically altered stomach where the jejunum is examined        distal to the anastomosis  Diagnosis Code(s):       --- Professional ---       K80.50, Calculus of bile duct without cholangitis or cholecystitis        without obstruction       K83.8, Other specified diseases of biliary tract       K86.89, Other specified diseases of  pancreas       K85.90, Acute pancreatitis without necrosis or infection, unspecified    CPT copyright 2022 [employer] Medical Association. All rights reserved.    The codes documented in this report are preliminary and upon  review may   be revised to meet current compliance requirements.    Electronically Signed by Josi Pantoja  ________________________  JOSI BENNETT MD  10/16/2023 3:05:38 PM  I was physically present for the entire viewing portion of the exam.  JOSI BENNETT MD  Number of Addenda: 0    Note Initiated On: 10/16/2023 1:43 PM  Total Procedure Duration: 0 hours 4 minutes 10 seconds   Scope In: 1:44:10 PM  S cope Out: 1:48:20 PM     ENDOSCOPIC RETROGRADE CHOLANGIOPANCREATOGRAPHY     Status: None   Result Value Ref Range    ERCP       [address] [place]  [address]  _______________________________________________________________________________  Patient Name: Chucho Murphy           Procedure Date: 10/16/2023 1:44 PM  MRN: [id number]                       Account Number: [id number]  YOB: 1950              Admit Type: Inpatient  Age: 73                               Room: [address] [address]  Note Status: Finalized                Attending MD: JOSI BENNETT MD,   Instrument Name: 504 [id number] ERCP         _______________________________________________________________________________     Procedure:                ERCP  Indications:              Common bile duct stone(s), Suspected ascending                             cholangitis, Acute pancreatitis  Providers:                JOSI BENNETT MD, Sanam Bonner RN  Referring MD:             CHUCHO GAN, DO  Medicines:                General Anesthesia  Complications:            No immediate complications.  ___ ____________________________________________________________________________  Procedure:                Pre-Anesthesia Assessment:                            - Prior to the  procedure, a History and Physical                             was performed, and patient medications and                             allergies were reviewed. The patient is competent.                             The risks and benefits of the procedure and the                             sedation options and risks were discussed with the                             patient. All questions were answered and informed                             consent was obtained. Patient identification and                             proposed procedure were verified by the physician.                             Mental Status Examination: normal. Prophylactic                             Antibiotics: The patient does not require                             prophylactic antibiotics. Prior Anticoagulants: The                              patient has taken no anticoagulant or antiplatelet                             agents. After reviewing the risks and benefits, the                             patient was deemed in satisfactory condition to                             undergo the procedure. The anesthesia plan was to                             use minimal sedation / analgesia (anxiolysis).                             Immediately prior to administration of medications,                             the patient was re-assessed for adequacy to receive                             sedatives. The heart rate, respiratory rate, oxygen                             saturations, blood pressure, adequacy of pulmonary                             ventilation, and response to care were monitored                             throughout the procedure. The physical status of                             the patient was re-assessed after the procedure.                            After obtaining informed consent, the scope was                              passed under direct vision. Throughout the                             procedure, the patient's blood  pressure, pulse, and                             oxygen saturations were monitored continuously. The                             Duodenoscope was introduced through the mouth, and                             used to inject contrast into and used to cannulate                             the bile duct. The ERCP was accomplished without                             difficulty. The patient tolerated the procedure                             well.                                                                                   Findings:       The  film was normal. The upper GI tract was traversed under direct        vision without detailed examination. An acquired benign-appearing,        intrinsic moderate stenosis was found in the first portion of the        duodenum and was traversed. The major papilla was bulging. The major        papilla was on the rim of a diverticul um. The bile duct was deeply        cannulated with the short-nosed traction sphincterotome. Contrast was        injected. I personally interpreted the bile duct images. There was brisk        flow of contrast through the ducts. Image quality was excellent.        Contrast extended to the biliary pancreatic junction. Contrast extended        to the main bile duct. Contrast extended to the cystic duct.        Opacification of the main bile duct was successful. The maximum diameter        of the ducts was 10 mm. A short 0.035 inch Soft Jagwire was passed into        the biliary tree. An 8 mm biliary sphincterotomy was made with a        monofilament traction (standard) sphincterotome using ERBE        electrocautery. There was no post-sphincterotomy bleeding. The biliary        tree was swept with a 12 mm balloon starting at the bifurcation. Sludge        was swept from the duct. All stones were removed. Pus was swept from the        duct. One 10 Fr by 7 cm temporary stent with a single  external flap and        a single internal flap was placed 6  cm into the common bile duct. Bile        flowed through the stent. The stent was in good position.                                                                                   Impression:               - Acquired duodenal stenosis.                            - The major papilla appeared to be bulging.                            - The major papilla was on the rim of a                             diverticulum.                            - Choledocholithiasis was found. Complete removal                             was accomplished by biliary sphincterotomy and                             balloon extraction.                            - A biliary sphincterotomy was performed.                            - The biliary tree was swept and pus was found.                            - One temporary stent was placed into the common                             bile duct.  Recommendation:           - Return patient to hospital [facility] f or ongoing care.                            - Clear liquid diet.                            - Return patient to hospital [facility] for ongoing care.                            - Repeat ERCP in 3 months to remove stent.                                                                                   Procedure Code(s):       --- Professional ---       66543, Endoscopic retrograde cholangiopancreatography (ERCP); with        placement of endoscopic stent into biliary or pancreatic duct, including        pre- and post-dilation and guide wire passage, when performed, including        sphincterotomy, when performed, each stent       25100, Endoscopic retrograde cholangiopancreatography (ERCP); with        removal of calculi/debris from biliary/pancreatic duct(s)  Diagnosis Code(s):       --- Professional ---       K31.5, Obstruction of duodenum       K80.50, Calculus of bile duct without cholangitis or cholecystitis        without obstruction       K85.90, Acute pancreatitis without necrosis or   infection, unspecified       K83.8, Other specified diseases of biliary tract    CPT copyright 2022 [employer]. All rights reserved.    The codes documented in this report are preliminary and upon  review may   be revised to meet current compliance requirements.    Electronically Signed by Trent Pantoja  ________________________  TRENT BENNETT MD  10/16/2023 3:18:28 PM  I was physically present for the entire viewing portion of the exam.  TRENT BENNETT MD  Number of Addenda: 0    Note Initiated On: 10/16/2023 1:44 PM  Total Procedure Duration: 0 hours 16 minutes 34 seconds   Scope In: 1:48:45 PM  Scope Out: 2:05:19 PM     XR Surgery DAVID L/T 5 Min Fluoro w Stills     Status: None    Narrative    This exam was marked as non-reportable because it will not be read by a   radiologist or a [place] non-radiologist provider.         CBC with platelets     Status: Abnormal   Result Value Ref Range    WBC Count 6.0 4.0 - 11.0 10e3/uL    RBC Count 4.15 (L) 4.40 - 5.90 10e6/uL    Hemoglobin 11.6 (L) 13.3 - 17.7 g/dL    Hematocrit 33.8 (L) 40.0 - 53.0 %    MCV 81 78 - 100 fL    MCH 28.0 26.5 - 33.0 pg    MCHC 34.3 31.5 - 36.5 g/dL    RDW 16.1 (H) 10.0 - 15.0 %    Platelet Count 222 150 - 450 10e3/uL   Comprehensive metabolic panel     Status: Abnormal   Result Value Ref Range    Sodium 140 135 - 145 mmol/L    Potassium 1.6 (LL) 3.4 - 5.3 mmol/L    Carbon Dioxide (CO2) 12 (L) 22 - 29 mmol/L    Anion Gap 13 7 - 15 mmol/L    Urea Nitrogen 11.2 8.0 - 23.0 mg/dL    Creatinine 1.74 (H) 0.67 - 1.17 mg/dL    GFR Estimate 41 (L) >60 mL/min/1.73m2    Calcium 9.7 8.8 - 10.2 mg/dL    Chloride 115 (H) 98 - 107 mmol/L    Glucose 101 (H) 70 - 99 mg/dL    Alkaline Phosphatase 248 (H) 40 - 129 U/L    AST 23 0 - 45 U/L    ALT 18 0 - 70 U/L    Protein Total 6.0 (L) 6.4 - 8.3 g/dL    Albumin 2.9 (L) 3.5 - 5.2 g/dL    Bilirubin Total 0.6 <=1.2 mg/dL   INR     Status: Normal   Result Value Ref Range    INR 1.14 0.85  - 1.15   Potassium     Status: Abnormal   Result Value Ref Range    Potassium 2.2 (LL) 3.4 - 5.3 mmol/L   Potassium     Status: Abnormal   Result Value Ref Range    Potassium 2.1 (LL) 3.4 - 5.3 mmol/L   Magnesium     Status: Normal   Result Value Ref Range    Magnesium 2.0 1.7 - 2.3 mg/dL   Potassium     Status: Abnormal   Result Value Ref Range    Potassium 2.5 (LL) 3.4 - 5.3 mmol/L   Potassium     Status: Abnormal   Result Value Ref Range    Potassium 3.2 (L) 3.4 - 5.3 mmol/L   Potassium     Status: Normal   Result Value Ref Range    Potassium 3.6 3.4 - 5.3 mmol/L   CBC with platelets     Status: Abnormal   Result Value Ref Range    WBC Count 5.9 4.0 - 11.0 10e3/uL    RBC Count 3.51 (L) 4.40 - 5.90 10e6/uL    Hemoglobin 9.6 (L) 13.3 - 17.7 g/dL    Hematocrit 28.4 (L) 40.0 - 53.0 %    MCV 81 78 - 100 fL    MCH 27.4 26.5 - 33.0 pg    MCHC 33.8 31.5 - 36.5 g/dL    RDW 16.8 (H) 10.0 - 15.0 %    Platelet Count 193 150 - 450 10e3/uL   Comprehensive metabolic panel     Status: Abnormal   Result Value Ref Range    Sodium 145 135 - 145 mmol/L    Potassium 3.6 3.4 - 5.3 mmol/L    Carbon Dioxide (CO2) 9 (LL) 22 - 29 mmol/L    Anion Gap 9 7 - 15 mmol/L    Urea Nitrogen 14.5 8.0 - 23.0 mg/dL    Creatinine 1.60 (H) 0.67 - 1.17 mg/dL    GFR Estimate 45 (L) >60 mL/min/1.73m2    Calcium 9.5 8.8 - 10.2 mg/dL    Chloride 127 (H) 98 - 107 mmol/L    Glucose 73 70 - 99 mg/dL    Alkaline Phosphatase 194 (H) 40 - 129 U/L    AST 19 0 - 45 U/L    ALT 14 0 - 70 U/L    Protein Total 5.3 (L) 6.4 - 8.3 g/dL    Albumin 2.4 (L) 3.5 - 5.2 g/dL    Bilirubin Total 0.6 <=1.2 mg/dL   Ketone Beta-Hydroxybutyrate Quantitative     Status: Normal   Result Value Ref Range    Ketone (Beta-Hydroxybutyrate) Quantitative 0.30 <=0.30 mmol/L   Lactic acid whole blood     Status: Normal   Result Value Ref Range    Lactic Acid 0.7 0.7 - 2.0 mmol/L   CBC with platelets     Status: Abnormal   Result Value Ref Range    WBC Count 6.5 4.0 - 11.0 10e3/uL    RBC Count  3.80 (L) 4.40 - 5.90 10e6/uL    Hemoglobin 10.4 (L) 13.3 - 17.7 g/dL    Hematocrit 30.9 (L) 40.0 - 53.0 %    MCV 81 78 - 100 fL    MCH 27.4 26.5 - 33.0 pg    MCHC 33.7 31.5 - 36.5 g/dL    RDW 17.1 (H) 10.0 - 15.0 %    Platelet Count 218 150 - 450 10e3/uL   Comprehensive metabolic panel     Status: Abnormal   Result Value Ref Range    Sodium 144 135 - 145 mmol/L    Potassium 3.0 (L) 3.4 - 5.3 mmol/L    Carbon Dioxide (CO2) 15 (L) 22 - 29 mmol/L    Anion Gap 10 7 - 15 mmol/L    Urea Nitrogen 12.2 8.0 - 23.0 mg/dL    Creatinine 1.51 (H) 0.67 - 1.17 mg/dL    GFR Estimate 49 (L) >60 mL/min/1.73m2    Calcium 9.5 8.8 - 10.2 mg/dL    Chloride 119 (H) 98 - 107 mmol/L    Glucose 93 70 - 99 mg/dL    Alkaline Phosphatase 184 (H) 40 - 129 U/L    AST 18 0 - 45 U/L    ALT 13 0 - 70 U/L    Protein Total 5.7 (L) 6.4 - 8.3 g/dL    Albumin 2.6 (L) 3.5 - 5.2 g/dL    Bilirubin Total 0.5 <=1.2 mg/dL   Lipase     Status: Abnormal   Result Value Ref Range    Lipase 92 (H) 13 - 60 U/L   Potassium     Status: Normal   Result Value Ref Range    Potassium 3.4 3.4 - 5.3 mmol/L   Glucose by meter     Status: Normal   Result Value Ref Range    GLUCOSE BY METER POCT 79 70 - 99 mg/dL   Potassium     Status: Abnormal   Result Value Ref Range    Potassium 3.2 (L) 3.4 - 5.3 mmol/L   Glucose by meter     Status: Normal   Result Value Ref Range    GLUCOSE BY METER POCT 88 70 - 99 mg/dL   Glucose by meter     Status: Normal   Result Value Ref Range    GLUCOSE BY METER POCT 99 70 - 99 mg/dL   CBC with platelets     Status: Abnormal   Result Value Ref Range    WBC Count 6.4 4.0 - 11.0 10e3/uL    RBC Count 3.54 (L) 4.40 - 5.90 10e6/uL    Hemoglobin 9.9 (L) 13.3 - 17.7 g/dL    Hematocrit 28.8 (L) 40.0 - 53.0 %    MCV 81 78 - 100 fL    MCH 28.0 26.5 - 33.0 pg    MCHC 34.4 31.5 - 36.5 g/dL    RDW 17.1 (H) 10.0 - 15.0 %    Platelet Count 183 150 - 450 10e3/uL   Basic metabolic panel     Status: Abnormal   Result Value Ref Range    Sodium 143 135 - 145 mmol/L     Potassium 3.2 (L) 3.4 - 5.3 mmol/L    Chloride 116 (H) 98 - 107 mmol/L    Carbon Dioxide (CO2) 17 (L) 22 - 29 mmol/L    Anion Gap 10 7 - 15 mmol/L    Urea Nitrogen 8.8 8.0 - 23.0 mg/dL    Creatinine 1.46 (H) 0.67 - 1.17 mg/dL    GFR Estimate 51 (L) >60 mL/min/1.73m2    Calcium 9.0 8.8 - 10.2 mg/dL    Glucose 88 70 - 99 mg/dL   Glucose by meter     Status: Normal   Result Value Ref Range    GLUCOSE BY METER POCT 79 70 - 99 mg/dL   Glucose by meter     Status: Normal   Result Value Ref Range    GLUCOSE BY METER POCT 79 70 - 99 mg/dL   Potassium     Status: Normal   Result Value Ref Range    Potassium 3.4 3.4 - 5.3 mmol/L   Glucose by meter     Status: Normal   Result Value Ref Range    GLUCOSE BY METER POCT 73 70 - 99 mg/dL   Potassium     Status: Normal   Result Value Ref Range    Potassium 3.6 3.4 - 5.3 mmol/L   Glucose by meter     Status: Normal   Result Value Ref Range    GLUCOSE BY METER POCT 99 70 - 99 mg/dL   Comprehensive metabolic panel     Status: Abnormal   Result Value Ref Range    Sodium 139 135 - 145 mmol/L    Potassium 3.6 3.4 - 5.3 mmol/L    Carbon Dioxide (CO2) 16 (L) 22 - 29 mmol/L    Anion Gap 11 7 - 15 mmol/L    Urea Nitrogen 6.8 (L) 8.0 - 23.0 mg/dL    Creatinine 1.32 (H) 0.67 - 1.17 mg/dL    GFR Estimate 57 (L) >60 mL/min/1.73m2    Calcium 8.9 8.8 - 10.2 mg/dL    Chloride 112 (H) 98 - 107 mmol/L    Glucose 99 70 - 99 mg/dL    Alkaline Phosphatase 150 (H) 40 - 129 U/L    AST 33 0 - 45 U/L    ALT 19 0 - 70 U/L    Protein Total 5.1 (L) 6.4 - 8.3 g/dL    Albumin 2.4 (L) 3.5 - 5.2 g/dL    Bilirubin Total 0.6 <=1.2 mg/dL   CBC with platelets     Status: Abnormal   Result Value Ref Range    WBC Count 5.3 4.0 - 11.0 10e3/uL    RBC Count 3.53 (L) 4.40 - 5.90 10e6/uL    Hemoglobin 9.8 (L) 13.3 - 17.7 g/dL    Hematocrit 29.1 (L) 40.0 - 53.0 %    MCV 82 78 - 100 fL    MCH 27.8 26.5 - 33.0 pg    MCHC 33.7 31.5 - 36.5 g/dL    RDW 17.6 (H) 10.0 - 15.0 %    Platelet Count 162 150 - 450 10e3/uL   Glucose by  meter     Status: Normal   Result Value Ref Range    GLUCOSE BY METER POCT 77 70 - 99 mg/dL   Glucose by meter     Status: Normal   Result Value Ref Range    GLUCOSE BY METER POCT 90 70 - 99 mg/dL   Adult Type and Screen     Status: None   Result Value Ref Range    ABO/RH(D) O POS     Antibody Screen Negative Negative    SPECIMEN EXPIRATION DATE [id number]    Blood Culture Arm, Left     Status: Normal (Preliminary result)    Specimen: Arm, Left; Blood   Result Value Ref Range    Culture No growth after 3 days     Narrative    Only an Aerobic Blood Culture Bottle was collected, interpret results with caution.      Blood Culture Hand, Left     Status: Normal (Preliminary result)    Specimen: Hand, Left; Blood   Result Value Ref Range    Culture No growth after 3 days     Narrative    Only an Aerobic Blood Culture Bottle was collected, interpret results with caution.      ABO/Rh type and screen     Status: None    Narrative    The following orders were created for panel order ABO/Rh type and screen.  Procedure                               Abnormality         Status                     ---------                               -----------         ------                     Adult Type and Screen[[id number]]                            Final result                 Please view results for these tests on the individual orders.

## 2023-10-19 NOTE — PROGRESS NOTES
Care Management Discharge Note    Discharge Date: 10/19/2023       Discharge Disposition: Home    Discharge Services: None    Discharge DME: None    Discharge Transportation: family or friend will provide    Private pay costs discussed: Not applicable    Does the patient's insurance plan have a 3 day qualifying hospital stay waiver?  No    PAS Confirmation Code:    Patient/family educated on Medicare website which has current facility and service quality ratings:      Education Provided on the Discharge Plan:    Persons Notified of Discharge Plans: Patient   Patient/Family in Agreement with the Plan: yes    Handoff Referral Completed: Yes    Additional Information:  Patient is being discharged home today. PCP follow up Scheduled for Nov 08, 2023  1:20 PM  (Arrive by 1:05 PM)  ED/Hospital Follow Up with Dannie Ponce MD  [facility] ([facility] )      LYN Landis

## 2023-10-20 NOTE — PROGRESS NOTES
Clinic Care Coordination Contact  [facility]: Post-Discharge Note  SITUATION                                                      Admission:    Admission Date: 10/15/23   Reason for Admission: Gallstone pancreatitis  Discharge:   Discharge Date: 10/19/23  Discharge Diagnosis: Gallstone pancreatitis    BACKGROUND                                                      Per hospital discharge summary and inpatient provider notes:    Quan Murphy is a 73 year old male admitted to [facility] on October 15 for gallstone pancreatitis. Pt was recently hospitalized from 10/7-10/10/23 . Quan Murphy is a 73 year old male with metastatic squamous cell carcinoma of tonsils, s/p chemoradiation, recent ablation of liver metastasis on 9/19/2023, CAD, hypertension, hyperlipidemia  He presented to [facility] ER on 9/27 with fever and abdominal pain.  Ultrasound showed acute cholecystitis with cholelithiasis.  He declined admission and left AMA.  Blood culture grew Enterococcus hirae sensitive to ampicillin.  He did not receive antibiotics for this.  Multiple attempts were made to contact the patient but were unsuccessful.  His abdominal pain subsequently resolved.  He presented to [facility] ER again on 10/7 with 1 week of diarrhea, nausea, anorexia, poor oral intake 10 pound weight loss in 1 week generalized weakness, dizziness and recurrent falls.    Work-up showed severe hypokalemia with potassium of 2.2 acute kidney injury with creatinine of 2.5.  There was concern that his symptoms are likely related to acute cholecystitis with cholelithiasis.  He was felt to be a high risk patient and was thus transferred to [facility] for general surgery evaluation. Consulted general surgery. HIDA scan was Negative for acute cholecystitis and biliary dyskinesia. No surgery recommended. Symptomatic treatment.Patient presented with ongoing abdominal pain, N/V and unable to eat due to pain and was only drinking  sprite PTA        ASSESSMENT           Discharge Assessment  How are you doing now that you are home?: Per the spouse of the patient he is moving around although he refuses to use the walker  How are your symptoms? (Red Flag symptoms escalate to triage hotline per guidelines): Improved  Do you feel your condition is stable enough to be safe at home until your provider visit?: Yes  Does the patient have their discharge instructions? : Yes  Does the patient have questions regarding their discharge instructions? : No  Were you started on any new medications or were there changes to any of your previous medications? : Yes  Does the patient have all of their medications?: Yes  Do you have questions regarding any of your medications? : No  Do you have all of your needed medical supplies or equipment (DME)?  (i.e. oxygen tank, CPAP, cane, etc.): Yes  Discharge follow-up appointment scheduled within 14 calendar days? : No  Discharge Follow Up Appointment Date: 11/08/23  Is patient agreeable to assistance with scheduling? : No         Post-op (Clinicians Only)  Did the patient have surgery or a procedure: Yes  Incision: healing  Drainage: No  Bleeding: none  Fever: No  Chills: No  Redness: No  Warmth: No  Swelling: No  Incision site pain: Yes  Closure: suture  Eating & Drinking: eating and drinking without complaints/concerns  PO Intake: low fat diet  Bowel Function: loose stools  Urinary Status: voiding without complaint/concerns        PLAN                                                      Outpatient Plan:  The spouse of the patient declined care coordination.  The contact information for the assigned RN DEVIN Tracy was given to the spouse.    Future Appointments   Date Time Provider Department Center   11/8/2023  1:20 PM Dannie Ponce MD [facility]         For any urgent concerns, please contact our 24 hour nurse triage line: [phone] ([phone])         Juan Quintero MSN, RN, PHN, CCM / Covering for  [place] [phone]  Primary Care Clinical RN Care Coordinator  [facility]  10/20/2023   9:46 AM  [email or web address]  Office: [phone]

## 2023-10-20 NOTE — PLAN OF CARE
Occupational Therapy Discharge Summary    Reason for therapy discharge:    Discharged to home.    Progress towards therapy goal(s). See goals on Care Plan in [facility] electronic health record for goal details.  Goals partially met.  Barriers to achieving goals:   discharge from facility.    Therapy recommendation(s):    Pt currently below baseline d/t decreased activity tolerance. Pt recently discharged home w/ assist from spouse but readmitted d/t new diagnosis. Pt reports that they have been getting stronger lately and have no concern about returning home w/ assist from spouse. Anticipate once medically ready pt will be safe to dischage home.

## 2023-10-20 NOTE — TELEPHONE ENCOUNTER
Reason for Call:  Appointment Request    Patient requesting this type of appt:  Hospital/ED Follow-Up     Requested provider: Monico Rivers    Reason patient unable to be scheduled: Not with their preferred provider    When does patient want to be seen/preferred time: 1-2 weeks    Comments: PT was scheduled with Dannie Ponce on 11/8 but Cheri, wife, called and stated patient is admit about seeing his PCP.  Requesting an appt and to be fit in.    Could we send this information to you in [facility] or would you prefer to receive a phone call?:   Patient would prefer a phone call   Okay to leave a detailed message?: Yes at Cell number on file:    Telephone Information:   Mobile [phone]       Call taken on 10/20/2023 at 10:22 AM by Angelina Macedo

## 2023-10-24 NOTE — TELEPHONE ENCOUNTER
Patient's wife stated patient was admitted to the hospital and in the ED several times since 10/7/2023.  She stated patient started having increased pain and was taking more medication than prescribed for his daily level of pain when he was at home.  She stated patient is scheduled for a hospital follow up on 11/1/2023, but will need a refill of his oxycodone by Friday 10/27/2023 (earlier than said refill date due to extreme home pain that brought him into the hospital.   Cheri stated she picked up a prescription for 20, 5 mg tablets that will only last him 3 days (as he takes the prescribed 5 mg tablets, 2 tablets 3 times a day).    Patient's wife stated between the cancer, the missed diagnosis and procedures this past month, patient's pain had increased and he is now trying to get back on his daily regimen of oxycodone.    Will forward to PCP for review.    Patient's wife stated if they need to reschedule his 11/1/2023 follow up visit for a date prior to Friday to get a refill early, please let them know when he could be seen.      Patient takes 2, 5mg tablets, 3 times a day   Disp Refills Start End BERTIN   oxyCODONE (ROXICODONE) 5 MG tablet 120 tablet 0 10/3/2023  No   Sig: TAKE 1-2 TABLETS (5-10 MG) BY MOUTH EVERY 6 HOURS AS NEEDED FOR SEVERE PAIN (LIMIT TO 6 TABLETS PER DAY)   Sent to pharmacy as: oxyCODONE HCl 5 MG Oral Tablet (ROXICODONE)   Class: E-Prescribe      Renay Jackson RN

## 2023-10-24 NOTE — PROGRESS NOTES
Clinic Care Coordination Contact  Care Team Conversations    Patient's wife called RN CC and left message. She has some questions on patient's Oxycodone.     Patient is not actively enrolled in care coordination. Wife and patient declined care coordination on 10/20/2023.     RN CC will send message to care team in clinic to call and follow up.     Rossana Moore RN Care Coordination   [facility]  Email: [email or web address]  Phone: [phone]

## 2023-10-27 NOTE — TELEPHONE ENCOUNTER
"Provider approval of early refill for oxycodone 5 mg by mouth every 6 hours as needed for moderate pain per documentation Dr. Silvestre MD as stated below.      Medication was not ordered, and encounter was \"doned\".    Will forward to [facility].    Renay Jackson RN    "

## 2023-10-27 NOTE — TELEPHONE ENCOUNTER
Phoned patient and informed him of refill request approval and sent to pharmacy.  Patient stated understanding.    Renay Jackson RN

## 2023-10-28 PROBLEM — R63.0 DECREASED APPETITE: Status: ACTIVE | Noted: 2023-01-01

## 2023-10-28 PROBLEM — E87.6 HYPOKALEMIA: Status: ACTIVE | Noted: 2023-01-01

## 2023-10-28 NOTE — ED PROVIDER NOTES
History     Chief Complaint   Patient presents with    Dehydration     HPI  Quan Murphy is a 73 year old male with history of metastatic squamous cell carcinoma of tonsils (s/p chemoradiation and follows with [facility]), recent ablation of liver metastasis on 9/19/2023, CAD, hypertension, hyperlipidemia, prostate cancer, CAD, and stage III CKD who presents for evaluation of decreased appetite and concern for dehydration. Patient was hospitalized 10/15/2023-10/19/2023 for gallstone pancreatitis.  S/p ERCP with sphincterotomy and stent placement on 10/16 with subsequent laparoscopic cholecystectomy on 10/18.  See notes below. He was discharged home on 10/19/2023, 5 days ago. He was to complete Cipro and Flagyl course. Also prescribed 10 day course of Amoxicillin 875. He was told to increase diet as tolerated.  Patient has been able to tolerate fluids well, but has no appetite. Denies nausea or vomiting. Denies abdominal pain. He is passing some loose stools since surgery, but not diarrhea. Passing gas. He completed the Cipro/Flagyl but stopped Amoxicillin and Potassium pills because of the size of the pills causing him to gag.  He always has difficulty with large pills, that he attributes to his tonsil cancer.        Admission  Discharged  10/15/2023 - 10/19/2023 (4 days)  [facility]     Nikole, Rashid Bustillo MD  Last attending  Treatment team Gallstone pancreatitis  Principal problem     Discharge Summary  Edwin Langston MD (Physician)  Hospitalist  Discharge Summary     Quan Murphy MRN# [id number]   YOB: 1950 Age: 73 year old      Date of Admission:                  10/15/2023  Date of Discharge:                  10/19/2023  Admitting Physician:               Rashid Agustin MD  Discharge Physician:              Edwin Langston MD Pager [phone] Office [phone]  Discharging Service:               [facility] Hospitalist Service     Primary Provider:  Monico Rivers          Discharge Diagnosis:      See problem-oriented hospital course for diagnoses addressed during this hospital stay.             Discharge Disposition:      Discharged to home           Condition on Discharge:      Discharge condition: Stable   Discharge vitals: Blood pressure (!) 148/82, pulse 61, temperature 97.5  F (36.4  C), temperature source Oral, resp. rate 16, weight 85.9 kg (189 lb 6.4 oz), SpO2 99%.   Code status on discharge: Full Code           Discharge Medications:          Review of your medicines          START taking         Dose / Directions   ciprofloxacin 500 MG tablet  Commonly known as: CIPRO  Indication: cholangitis  Used for: Choledocholithiasis       Dose: 500 mg  Take 1 tablet (500 mg) by mouth every 12 hours  Quantity: 6 tablet  Refills: 0      methocarbamol 500 MG tablet  Commonly known as: ROBAXIN  Used for: Postoperative pain       Dose: 500 mg  Take 1 tablet (500 mg) by mouth 4 times daily for 5 days  Quantity: 20 tablet  Refills: 0      metroNIDAZOLE 500 MG tablet  Commonly known as: FLAGYL  Indication: cholangitis  Used for: Choledocholithiasis       Dose: 500 mg  Take 1 tablet (500 mg) by mouth 2 times daily  Quantity: 6 tablet  Refills: 0      senna-docusate 8.6-50 MG tablet  Commonly known as: SENOKOT-S/PERICOLACE  Used for: Acute cholecystitis       Dose: 1 tablet  Take 1 tablet by mouth 2 times daily as needed for constipation  Quantity: 10 tablet  Refills: 0      sennosides 8.6 MG tablet  Commonly known as: SENOKOT  Used for: Postoperative pain       Dose: 1-2 tablet  Take 1-2 tablets by mouth 2 times daily as needed for constipation  Quantity: 30 tablet  Refills: 0                CONTINUE these medicines which may have CHANGED, or have new prescriptions. If we are uncertain of the size of tablets/capsules you have at home, strength may be listed as something that might have changed.         Dose / Directions   * oxyCODONE 5 MG tablet  Commonly known  as: ROXICODONE  This may have changed: Another medication with the same name was added. Make sure you understand how and when to take each.  Used for: 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), Osteoarthritis of right foot, unspecified osteoarthritis type       TAKE 1-2 TABLETS (5-10 MG) BY MOUTH EVERY 6 HOURS AS NEEDED FOR SEVERE PAIN (LIMIT TO 6 TABLETS PER DAY)  Quantity: 120 tablet  Refills: 0      * oxyCODONE 5 MG tablet  Commonly known as: ROXICODONE  This may have changed: You were already taking a medication with the same name, and this prescription was added. Make sure you understand how and when to take each.  Used for: Acute cholecystitis       Dose: 5 mg  Take 1 tablet (5 mg) by mouth every 6 hours as needed for pain  Quantity: 8 tablet  Refills: 0      * oxyCODONE 5 MG tablet  Commonly known as: ROXICODONE  This may have changed: You were already taking a medication with the same name, and this prescription was added. Make sure you understand how and when to take each.  Used for: Choledocholithiasis       Dose: 5 mg  Take 1 tablet (5 mg) by mouth every 6 hours as needed for moderate pain  Quantity: 20 tablet  Refills: 0              * This list has 3 medication(s) that are the same as other medications prescribed for you. Read the directions carefully, and ask your doctor or other care provider to review them with you.                    CONTINUE these medicines which have NOT CHANGED         Dose / Directions   amoxicillin 875 MG tablet  Commonly known as: AMOXIL  Used for: Bacteremia       Dose: 875 mg  Take 1 tablet (875 mg) by mouth 2 times daily for 10 days  Quantity: 20 tablet  Refills: 0      cetirizine 10 MG tablet  Commonly known as: zyrTEC  Used for: Chronic maxillary sinusitis       Dose: 10 mg  Take 1 tablet (10 mg) by mouth daily  Quantity: 30 tablet  Refills: 1      citalopram 40 MG tablet  Commonly known as: celeXA  Used for: Anxiety       Dose: 40 mg  TAKE 1 TABLET BY MOUTH ONCE  DAILY  Quantity: 90 tablet  Refills: 0      DULoxetine 60 MG capsule  Commonly known as: CYMBALTA  Used for: Osteoarthritis of right foot, unspecified osteoarthritis type       Dose: 60 mg  Take 1 capsule (60 mg) by mouth daily  Quantity: 90 capsule  Refills: 3      famotidine 20 MG tablet  Commonly known as: PEPCID  Indication: Gastroesophageal Reflux Disease       Dose: 20 mg  Take 20 mg by mouth 2 times daily as needed (heartburn)  Refills: 0      levothyroxine 100 MCG tablet  Commonly known as: SYNTHROID/LEVOTHROID       Dose: 100 mcg  Take 100 mcg by mouth daily  Refills: 0      metoprolol succinate ER 50 MG 24 hr tablet  Commonly known as: TOPROL XL  Used for: Benign essential hypertension       Dose: 25 mg  Take 0.5 tablets (25 mg) by mouth daily  Quantity: 90 tablet  Refills: 3      naloxone 4 MG/0.1ML nasal spray  Commonly known as: NARCAN  Used for: Osteoarthritis of right foot, unspecified osteoarthritis type, 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)       Dose: 4 mg  Spray 1 spray (4 mg) into one nostril alternating nostrils as needed for opioid reversal every 2-3 minutes until assistance arrives  Quantity: 0.2 mL  Refills: 0      nitroGLYcerin 0.4 MG sublingual tablet  Commonly known as: NITROSTAT  Used for: Atherosclerosis of native coronary artery of native heart with unstable angina pectoris (H)       For chest pain place 1 tablet under the tongue every 5 minutes for 3 doses. If symptoms persist 5 minutes after 1st dose call 911.  Quantity: 25 tablet  Refills: 0      omeprazole 40 MG DR capsule  Commonly known as: PriLOSEC  Used for: Gastroesophageal reflux disease with esophagitis, unspecified whether hemorrhage       Dose: 40 mg  Take 1 capsule (40 mg) by mouth daily as needed (heartburn)  Refills: 0      ondansetron 4 MG ODT tab  Commonly known as: ZOFRAN ODT  Used for: 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), Osteoarthritis of right foot, unspecified osteoarthritis type       DISSOLVE 1  TABLET (4 MG) BY MOUTH EVERY 8 HOURS AS NEEDED FOR NAUSEA  Quantity: 10 tablet  Refills: 0      pregabalin 150 MG capsule  Commonly known as: LYRICA  Used for: Osteoarthritis of right foot, unspecified osteoarthritis type       Dose: 150 mg  Take 1 capsule (150 mg) by mouth daily  Quantity: 120 capsule  Refills: 1      rosuvastatin 40 MG tablet  Commonly known as: CRESTOR  Used for: Hyperlipidemia LDL goal <130       Dose: 40 mg  Take 1 tablet (40 mg) by mouth daily  Quantity: 90 tablet  Refills: 3      zolpidem 5 MG tablet  Commonly known as: AMBIEN  Used for: 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, insomnia type       Dose: 5 mg  TAKE 1 TABLET (5 MG) BY MOUTH NIGHTLY AS NEEDED FOR SLEEP  Quantity: 30 tablet  Refills: 0                    Where to get your medicines          These medications were sent to [facility] [place]  [place], [place] - [address] [address]  [address] [address], [place] [address]        Phone: [phone]   ciprofloxacin 500 MG tablet  methocarbamol 500 MG tablet  metroNIDAZOLE 500 MG tablet  oxyCODONE 5 MG tablet  senna-docusate 8.6-50 MG tablet  sennosides 8.6 MG tablet         Some of these will need a paper prescription and others can be bought over the counter. Ask your nurse if you have questions.    Bring a paper prescription for each of these medications  oxyCODONE 5 MG tablet                 Consultations:      GI  Surgery.              Brief Hx and Hospital Course:      Quan Murphy is a 73 year old male admitted to [facility] on October 15 for gallstone pancreatitis. Pt was recently hospitalized from 10/7-10/10/23 . Quan Murphy is a 73 year old male with metastatic squamous cell carcinoma of tonsils, s/p chemoradiation, recent ablation of liver metastasis on 9/19/2023, CAD, hypertension, hyperlipidemia  He presented to [facility] ER on 9/27 with fever and abdominal pain.  Ultrasound showed acute cholecystitis with cholelithiasis.  He  declined admission and left AMA.  Blood culture grew Enterococcus hirae sensitive to ampicillin.  He did not receive antibiotics for this.  Multiple attempts were made to contact the patient but were unsuccessful.  His abdominal pain subsequently resolved.  He presented to [facility] ER again on 10/7 with 1 week of diarrhea, nausea, anorexia, poor oral intake 10 pound weight loss in 1 week generalized weakness, dizziness and recurrent falls.    Work-up showed severe hypokalemia with potassium of 2.2 acute kidney injury with creatinine of 2.5.  There was concern that his symptoms are likely related to acute cholecystitis with cholelithiasis.  He was felt to be a high risk patient and was thus transferred to [facility] for general surgery evaluation. Consulted general surgery. HIDA scan was Negative for acute cholecystitis and biliary dyskinesia. No surgery recommended. Symptomatic treatment.Patient presented with ongoing abdominal pain, N/V and unable to eat due to pain and was only drinking sprite PTA       Choledocholithiasis status post ERCP and sphincterotomy, stone removal and stent placement by GI with Dr. Blood  on 10/16/2023  Possible acute cholangitis with first seen during ERCP and CBD  Gallstone pancreatitis s/p lap-choli on 10/18.  Acute metabolic encephalopathy. Resolved.  Patient presented to the emergency room with a 5-day history of nausea, vomiting, abdominal pain.  Blood work was significant for an ALP elevation and lipase elevation.  CT scan revealed cholelithiasis with a density in the region of the distal common bile duct near the ampulla most likely representing choledocholithiasis.  Peripancreatic edema was also seen consistent with pancreatitis.  The patient has been having ongoing issues with acute cholecystitis and cholelithiasis since September.   performed EUS and  ERCP, sphincterotomy, stone removal and stent placement on 10/16/2023  Pus was noted in the CBD  with during the stone removal.  S/p laparoscopic cholecystectomy on 10/18.  Improving abd pain.  Reports BM this am.  - Cont PPI.   - Pain control (change to PO today).  - Has been on zosyn, transitioned to cipro/flagyl at discharge.   - Follow up with GI/surgery.        Enterococcal bacteremia prior to admission       Per chart refuced treatment in sept.  Repeat blood cultures remain negative   He was treated with Unasyn and discharged on Augmentin.  Blood cultures repeated remain negative so far   IV zosyn-->cipro/flagyl at discharge.     Severe hypokalemia at 1.6 improved   Likely due to GI losses from his vomiting.  Asymptomatic with generalized weakness and EKG changes.  Potassium has significantly improved with replacement and last check was 2.8 prior to transfer; however I believe that this is an erroneous lab value as the patient had only received 30 mill equivalents of potassium prior to rechecking. 3.6 this am.  - BMP next week.        Metabolic acidosis with bicarb of 9:  Lactate normal  Ketones normal   Bicarb improved from 9-16 on 10/19/2023  Hydration.  BMP next week,     SURESH  Creatinine was 1.09 on September 27, admission creatinine is 1.98.  Presumably hypovolemic secondary to his nausea, and vomiting.  Improved to 1.46--1.32 this am.  -Hydration.  -Avoid nephrotoxic medications  - BMP next week.     Metastatic squamous cell carcinoma of the tonsils status post chemoradiation  Mets to the liver status post ablation on September 19, 2023  -Follows at the [facility]  .  Coronary artery disease  Hypertension  Dyslipidemia  Chest pain-free on exam.  EKG is abnormal with changes consistent of hypokalemia as mentioned above.  -Resumed PTA metoprolol and Crestor   - Outpatient follow up.     .     He is being discharged home in stable conditions.  Offered to keep him till tomorrow to watch electrolyts but pt wanted to be discharged.                 Allergies:  Allergies   Allergen Reactions    Animal  Dander     Azithromycin Nausea and Vomiting    Dust Mites     Pollen Extract     Smoke.        Problem List:    Patient Active Problem List    Diagnosis Date Noted    Gallstone pancreatitis 10/15/2023     Priority: Medium    Acute cholecystitis 10/07/2023     Priority: Medium    Hypothyroidism due to acquired atrophy of thyroid 03/09/2023     Priority: Medium    Malignant neoplasm metastatic to bone (H) 01/17/2023     Priority: Medium    Pancytopenia (H) 08/16/2022     Priority: Medium    Uncomplicated opioid dependence (H)      Priority: Medium    Chronic kidney disease, stage 3 (H) 06/15/2021     Priority: Medium    Failure to thrive (0-17) 06/02/2021     Priority: Medium     Replacing diagnoses that were inactivated after the 10/1/2021 regulatory import.      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     Priority: Medium    Hypomagnesemia 04/13/2021     Priority: Medium    Hiatal hernia 02/17/2020     Priority: Medium    Renal hematoma 10/28/2017     Priority: Medium    Retroperitoneal hematoma 10/28/2017     Priority: Medium    Syncope 10/18/2017     Priority: Medium    S/P ORIF (open reduction internal fixation) fracture 08/03/2017     Priority: Medium    Closed Colles' fracture of right radius with routine healing, subsequent encounter 08/03/2017     Priority: Medium    Status post insertion of drug-eluting stent into left anterior descending artery for coronary artery disease 01/05/2017     Priority: Medium    Chest pain 12/24/2016     Priority: Medium    Arthropathy 02/04/2014     Priority: Medium     Problem list name updated by automated process. Provider to review      Coronary atherosclerosis      Priority: Medium     Coronary artery disease  Problem list name updated by automated process. Provider to review      Hallux rigidus 07/16/2013     Priority: Medium    Inguinal hernia 06/28/2013     Priority: Medium    Degenerative arthritis of foot 06/28/2013     Priority: Medium    Hypertension  goal BP (blood pressure) < 140/90 06/12/2012     Priority: Medium    Hyperlipidemia LDL goal <130 12/22/2011     Priority: Medium    Anxiety 11/02/2011     Priority: Medium    Allergic conjunctivitis 07/08/2008     Priority: Medium    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, insomnia type 11/17/2006     Priority: Medium     Problem list name updated by automated process. Provider to review      Acute reaction to stress 01/12/2005     Priority: Medium     Problem list name updated by automated process. Provider to review      Chronic maxillary sinusitis 01/12/2005     Priority: Medium    Contracture of palmar fascia 01/12/2005     Priority: Medium    Benign neoplasm of skin of other and unspecified parts of face 01/12/2005     Priority: Medium    Malignant neoplasm of prostate (H) 11/26/2004     Priority: Medium        Past Medical History:    Past Medical History:   Diagnosis Date    Allergic rhinitis     Allergy, unspecified not elsewhere classified     Antiplatelet or antithrombotic long-term use     Anxiety     Arthritis     Chest pain     Chronic sinusitis     Coronary atherosclerosis of unspecified type of vessel, native or graft     Depressive disorder 1995    Gastroesophageal reflux disease 2020    Head injury 1954    Hiatal hernia 2015    History of blood transfusion 12/15/2004    Hyperlipidemia     Hypertension     Inguinal hernia     Kidney problem 10/08/2017    Kidney stones     Malignant neoplasm of prostate (H)     Prostate cancer (H)     Syncope     Tonsil cancer (H)        Past Surgical History:    Past Surgical History:   Procedure Laterality Date    ARTHRODESIS FOOT  7/23/2013    Procedure: ARTHRODESIS FOOT;  Great Toe Arthrodesis Left Foot;  Surgeon: Ash Gonzalez DPM;  Location: [facility] OR    ARTHRODESIS FOOT  6/10/2014    Procedure: ARTHRODESIS FOOT;  Surgeon: Ash Gonzalez DPM;  Location: [facility]    BIOPSY  10/1/2004    dermatologist biopsies    COLONOSCOPY  10/7/2013     Procedure: COLONOSCOPY;  Colonoscopy;  Surgeon: Mike Fallon MD;  Location: [facility]    CYSTOSCOPY N/A 2/16/2022    Procedure: CYSTOSCOPY and bladder stone removal;  Surgeon: David Rogers MD;  Location: [facility]    ENDOSCOPIC RETROGRADE CHOLANGIOPANCREATOGRAM COMPLEX N/A 10/16/2023    Procedure: ENDOSCOPIC RETROGRADE CHOLANGIOPANCREATOGRAPHY, COMPLEX;  Surgeon: Trent Blood MD;  Location: [facility] OR    ENDOSCOPIC ULTRASOUND UPPER GASTROINTESTINAL TRACT (GI) N/A 10/16/2023    Procedure: Endoscopic ultrasound upper gastrointestinal tract (GI);  Surgeon: Trent Blood MD;  Location: [facility]    ESOPHAGOSCOPY, GASTROSCOPY, DUODENOSCOPY (EGD), COMBINED N/A 2/12/2020    Procedure: ESOPHAGOGASTRODUODENOSCOPY (EGD);  Surgeon: Sam Escobar MD;  Location: [facility]    EXTRACORPOREAL SHOCK WAVE LITHOTRIPSY (ESWL) Bilateral 10/18/2017    Procedure: EXTRACORPOREAL SHOCK WAVE LITHOTRIPSY (ESWL);  BILATERAL EXTRACORPOREAL SHOCKWAVE LITHOTRIPSY ;  Surgeon: Meir Torres MD;  Location: [facility] OR    HC CORRECT BUNION,SIMPLE  08/11/2005    x3    HC REMV TOE BENIGN BONE LESN  08/11/2005    HEAD & NECK SURGERY  1954    From a fall    HERNIORRHAPHY INGUINAL  7/3/2013    Procedure: HERNIORRHAPHY INGUINAL;  Open Repair Inguinal hernia Right with mesh ;  Surgeon: Sam Escobar MD;  Location: [facility]    IR GASTROSTOMY TUBE PERCUTANEOUS PLCMNT  6/7/2021    LAPAROSCOPIC CHOLECYSTECTOMY N/A 10/18/2023    Procedure: CHOLECYSTECTOMY, LAPAROSCOPIC;  Surgeon: Dannie Warner MD;  Location: [facility] OR    MOHS MICROGRAPHIC PROCEDURE  08/23/11    ear and chin-[facility]    OPEN REDUCTION INTERNAL FIXATION WRIST Right 7/18/2017    Procedure: OPEN REDUCTION INTERNAL FIXATION WRIST;  Right distal radius open reduction and internal fixation;  Surgeon: Pedro Blanca DO;  Location: [facility]    RECONSTRUCT FOREFOOT WITH METATARSOPHALANGEAL (MTP) FUSION  6/10/2014    Procedure: RECONSTRUCT FOREFOOT WITH  METATARSOPHALANGEAL (MTP) FUSION;  Surgeon: Ash Gonzalez DPM;  Location: [facility]    STENT, CORONARY, DEMI      SURGICAL HISTORY OF -   1999/1974    lt knee    SURGICAL HISTORY OF -   10/2004    lithotripsy    SURGICAL HISTORY OF -   11/05    angiogram with stent    VASCULAR SURGERY  11/17/2005    Puncture of iliac artery during and angiogram    [facility] LAPAROSCOPY, SURGICAL PROSTATECTOMY, RETROPUBIC RADICAL, W/NERVE SPARING  11/30/2004    With full bilateral pelvic lymphadenectomy.  -[facility].    [facility] TOTAL KNEE ARTHROPLASTY  05/01/08    Left knee       Family History:    Family History   Problem Relation Age of Onset    Hypertension Father         Lived to age 87    Connective Tissue Disorder Mother         LUPUS    Heart Disease Mother         Valve replacement    Anxiety Disorder Mother         Lived to age 84    Dementia Mother         Nursing Home (lived to age 86)       Social History:  Marital Status:   [2]  Social History     Tobacco Use    Smoking status: Never    Smokeless tobacco: Never   Vaping Use    Vaping Use: Never used   Substance Use Topics    Alcohol use: Yes     Alcohol/week: 10.0 standard drinks of alcohol     Types: 10 Cans of beer per week     Comment: 1 hard  liquid and 1 wine or beer per day    Drug use: No        Medications:    acetaminophen (TYLENOL) 325 MG tablet  cetirizine (ZYRTEC) 10 MG tablet  ciprofloxacin (CIPRO) 500 MG tablet  citalopram (CELEXA) 40 MG tablet  DULoxetine (CYMBALTA) 60 MG capsule  famotidine (PEPCID) 20 MG tablet  levothyroxine (SYNTHROID/LEVOTHROID) 100 MCG tablet  metoprolol succinate ER (TOPROL XL) 50 MG 24 hr tablet  metroNIDAZOLE (FLAGYL) 500 MG tablet  naloxone (NARCAN) 4 MG/0.1ML nasal spray  nitroGLYcerin (NITROSTAT) 0.4 MG sublingual tablet  omeprazole (PRILOSEC) 40 MG DR capsule  ondansetron (ZOFRAN ODT) 4 MG ODT tab  ondansetron (ZOFRAN) 4 MG tablet  oxyCODONE (ROXICODONE) 5 MG tablet  oxyCODONE (ROXICODONE) 5 MG tablet  potassium chloride ER (K-TAB)  20 MEQ CR tablet  pregabalin (LYRICA) 150 MG capsule  rosuvastatin (CRESTOR) 40 MG tablet  sennosides (SENOKOT) 8.6 MG tablet  zolpidem (AMBIEN) 5 MG tablet          Review of Systems   Constitutional:  Positive for appetite change. Negative for fever.   HENT: Negative.     Respiratory: Negative.     Cardiovascular: Negative.    Gastrointestinal: Negative.    Genitourinary: Negative.    Musculoskeletal: Negative.    Skin: Negative.    Neurological: Negative.    All other systems reviewed and are negative.      Physical Exam   BP: 138/84  Pulse: 69  Temp: 97.4  F (36.3  C)  Resp: 16  Weight: 78.5 kg (173 lb 1.6 oz)  SpO2: 99 %      Physical Exam  Constitutional:       General: He is not in acute distress.     Appearance: He is well-developed. He is not ill-appearing.   HENT:      Head: Normocephalic and atraumatic.      Right Ear: External ear normal.      Left Ear: External ear normal.      Nose: Nose normal.      Mouth/Throat:      Mouth: Mucous membranes are moist.   Eyes:      Conjunctiva/sclera: Conjunctivae normal.   Cardiovascular:      Rate and Rhythm: Normal rate and regular rhythm.      Heart sounds: Normal heart sounds. No murmur heard.  Pulmonary:      Effort: Pulmonary effort is normal. No respiratory distress.      Breath sounds: Normal breath sounds.   Abdominal:      General: Bowel sounds are normal. There is no distension.      Palpations: Abdomen is soft.      Tenderness: There is no abdominal tenderness.      Comments: Laparoscopic incisional sites dry and intact, no surrounding erythema.    Musculoskeletal:         General: Normal range of motion.   Skin:     General: Skin is warm and dry.      Findings: No rash.   Neurological:      General: No focal deficit present.      Mental Status: He is alert and oriented to person, place, and time.         ED Course                 Procedures              EKG Interpretation:      Interpreted by MARCY Iraheta CNP  Time reviewed: 7:32  pm  Symptoms at time of EKG: none   Rhythm: normal sinus   Rate: normal  Axis: normal  Ectopy: none  Conduction: normal  ST Segments/ T Waves: diffuse nonspecific T-abnormality  Q Waves: none  Comparison to prior: Unchanged    Clinical Impression: NSR with no acute ischemic changes.           Results for orders placed or performed during the hospital encounter of 10/28/23 (from the past 24 hour(s))   CBC with platelets differential    Narrative    The following orders were created for panel order CBC with platelets differential.  Procedure                               Abnormality         Status                     ---------                               -----------         ------                     CBC with platelets and d...[[id number]]  Abnormal            Final result                 Please view results for these tests on the individual orders.   Comprehensive metabolic panel   Result Value Ref Range    Sodium 136 135 - 145 mmol/L    Potassium 2.2 (LL) 3.4 - 5.3 mmol/L    Carbon Dioxide (CO2) 17 (L) 22 - 29 mmol/L    Anion Gap 13 7 - 15 mmol/L    Urea Nitrogen 8.3 8.0 - 23.0 mg/dL    Creatinine 1.26 (H) 0.67 - 1.17 mg/dL    GFR Estimate 61 >60 mL/min/1.73m2    Calcium 9.8 8.8 - 10.2 mg/dL    Chloride 106 98 - 107 mmol/L    Glucose 101 (H) 70 - 99 mg/dL    Alkaline Phosphatase 353 (H) 40 - 129 U/L    AST 22 0 - 45 U/L    ALT 16 0 - 70 U/L    Protein Total 6.2 (L) 6.4 - 8.3 g/dL    Albumin 3.3 (L) 3.5 - 5.2 g/dL    Bilirubin Total 0.5 <=1.2 mg/dL   CBC with platelets and differential   Result Value Ref Range    WBC Count 8.7 4.0 - 11.0 10e3/uL    RBC Count 4.43 4.40 - 5.90 10e6/uL    Hemoglobin 12.2 (L) 13.3 - 17.7 g/dL    Hematocrit 36.5 (L) 40.0 - 53.0 %    MCV 82 78 - 100 fL    MCH 27.5 26.5 - 33.0 pg    MCHC 33.4 31.5 - 36.5 g/dL    RDW 19.1 (H) 10.0 - 15.0 %    Platelet Count 243 150 - 450 10e3/uL    % Neutrophils 76 %    % Lymphocytes 9 %    % Monocytes 13 %    % Eosinophils 2 %    % Basophils 0 %    %  Immature Granulocytes 0 %    NRBCs per 100 WBC 0 <1 /100    Absolute Neutrophils 6.6 1.6 - 8.3 10e3/uL    Absolute Lymphocytes 0.8 0.8 - 5.3 10e3/uL    Absolute Monocytes 1.1 0.0 - 1.3 10e3/uL    Absolute Eosinophils 0.2 0.0 - 0.7 10e3/uL    Absolute Basophils 0.0 0.0 - 0.2 10e3/uL    Absolute Immature Granulocytes 0.0 <=0.4 10e3/uL    Absolute NRBCs 0.0 10e3/uL   Lipase   Result Value Ref Range    Lipase 132 (H) 13 - 60 U/L   XR Abdomen 2 Views    Narrative    EXAM: XR ABDOMEN 2 VIEWS  LOCATION: [facility]  DATE: 10/28/2023    INDICATION: decreased appetite. recent ERCP with sphincterotomy and stent placed in common bile duct.  COMPARISON: 08/17/2022      Impression    IMPRESSION: Mild gaseous distention of the small large bowel suggestive of ileus. No free air. Plastic common bile duct stent is in place. Cholecystectomy. Vascular stent over the right iliac vessels. Scoliosis and degenerative changes of the spine.     *Note: Due to a large number of results and/or encounters for the requested time period, some results have not been displayed. A complete set of results can be found in Results Review.       Medications   sodium chloride 0.9 % infusion ( Intravenous $New Bag 10/28/23 1937)   potassium chloride 10 mEq in 100 mL sterile water infusion (10 mEq Intravenous $New Bag 10/28/23 1936)   ondansetron (ZOFRAN) injection 4 mg (4 mg Intravenous $Given 10/28/23 1834)   sodium chloride 0.9% BOLUS 1,000 mL (1,000 mLs Intravenous $New Bag 10/28/23 1834)       Assessments & Plan (with Medical Decision Making)  I consulted    73 year old male with history of metastatic squamous cell carcinoma of tonsils (s/p chemoradiation and follows with [facility]), recent ablation of liver metastasis on 9/19/2023, CAD, hypertension, hyperlipidemia, prostate cancer, CAD, and stage III CKD who presents for evaluation of decreased appetite and concern for dehydration. Patient was hospitalized  10/15/2023-10/19/2023 for gallstone pancreatitis.  S/p ERCP with sphincterotomy and stent placement on 10/16 with subsequent laparoscopic cholecystectomy on 10/18.  See notes below. He was discharged home on 10/19/2023, 5 days ago. He was to complete Cipro and Flagyl course. Also prescribed 10 day course of Amoxicillin 875. He was told to increase diet as tolerated.  Patient has been able to tolerate fluids well, but has no appetite. Denies nausea or vomiting. Denies abdominal pain. He is passing some loose stools since surgery, but not diarrhea. Passing gas. He completed the Cipro/Flagyl but stopped Amoxicillin and Potassium pills because of the size of the pills causing him to gag.  He always has difficulty with large pills, that he attributes to his tonsil cancer.      On exam patient has normal vital signs.  No abdominal tenderness or distention.  Abdomen is soft.  Good bowel sounds.  Lung sounds CTA.  No hypoxia.  Abdominal x-ray reveals shows stent in place. Mild gaseous distention suggestive of ileus.  Otherwise he has no evidence of bowel obstruction, as he is passing stools and has good bowel sounds.    Hemoglobin 12.2  No leukocytosis.  Potassium is critically low at 2.2, normal sodium, normal chloride, normal magnesium.  Lipase mildly elevated 132, but was at 1900 prior to his surgery.  Creatinine 1.26, in the setting of stage III CKD and is better than prior levels.    Patient's hypokalemia is likely related to his inability to take the potassium supplements and his decreased intake. Patient was given IV NS 1 liter bolus.  He needs admission for potassium replacement and monitoring.  IV potassium replacement protocol has been initiated.  He was given his home dose of oxycodone 10 mg p.o. Patient is in agreement with the plan for admission.  I spoke with on-call hospitalist, Dr. Floyd who accepts patient for admission.    - Dr. Floyd requested IV fluids to be changed to NS with 20kcl @ 75ml/hr and  this had been done.      New Prescriptions    No medications on file       Final diagnoses:   Hypokalemia   Decreased appetite       10/28/2023   [facility] EMERGENCY DEPT       Gemini Pandya APRN CNP  10/28/23 2014       Gemini Pandya APRN CNP  10/28/23 2102

## 2023-10-28 NOTE — ED TRIAGE NOTES
Pt presents with dehydration and nausea. No appetite. Only had boost yesterday. Pt had ERCP and lap jennifer. Looser stools.      Triage Assessment (Adult)       Row Name 10/28/23 3906          Triage Assessment    Airway WDL WDL        Respiratory WDL    Respiratory WDL WDL        Skin Circulation/Temperature WDL    Skin Circulation/Temperature WDL WDL        Cardiac WDL    Cardiac WDL WDL        Peripheral/Neurovascular WDL    Peripheral Neurovascular WDL WDL        Cognitive/Neuro/Behavioral WDL    Cognitive/Neuro/Behavioral WDL WDL

## 2023-10-29 PROBLEM — E63.9 INADEQUATE ORAL NUTRITIONAL INTAKE: Status: ACTIVE | Noted: 2023-01-01

## 2023-10-29 PROBLEM — R63.0 DECREASED APPETITE: Status: RESOLVED | Noted: 2023-01-01 | Resolved: 2023-01-01

## 2023-10-29 PROBLEM — R78.81 BACTEREMIA: Status: ACTIVE | Noted: 2023-01-01

## 2023-10-29 NOTE — ED NOTES
Pt feeling much better. Color pink. Pt denies nausea. One NS bolus infused. Pt on cardiac monitor. EKG done. KCL started IV. Spouse at bedside. Vss stable.

## 2023-10-29 NOTE — H&P
"Admit Date: 10/28/2023       Chief Complaints:    dehydration with decreased appetite    HPI:      73 years old male with a past medical history of metastatic squamous cell carcinoma of the tonsil status post chemoradiation at [facility], liver metastasis ablation, hypertension, dyslipidemia, chronic kidney disease, coronary artery disease, recent hospitalization for gallstone pancreatitis status post ERCP with subsequent laparoscopic cholecystectomy on 10/18/2023 and multiple other medical issues presented to the emergency room with poor p.o. intake/generalized weakness.  He has been trying to drink more fluids including water and Gatorade.  Patient had some loose stools but not watery diarrhea.   During recent hospitalization, he completed a course of Cipro/Flagyl and was also supposed to be on a course of amoxicillin/potassium pills which she did not take since he could not tolerate larger pills.  Work-up in ED showed a potassium of 2.2 with a BUN of 8.3/creatinine 1.06.  Lipase was 132.  X-ray of the abdomen shows biliary stent in place and nonspecific gas pattern but cannot rule out an ileus.  Patient has good bowel movements at this time and  denies any abdominal pain nausea vomiting.  Patient was admitted for further evaluation    Review of symptoms:  12 point review of system is negative unless otherwise stated in history of present illness    Clinically Significant Risk Factors Present on Admission        # Hypokalemia: Lowest K = 2.2 mmol/L in last 2 days, will replace as needed    # Hypercalcemia: corrected calcium is >10.1, will monitor as appropriate    # Hypoalbuminemia: Lowest albumin = 3.3 g/dL at 10/28/2023  6:17 PM, will monitor as appropriate     # Hypertension: Noted on problem list      # Overweight: Estimated body mass index is 26.32 kg/m  as calculated from the following:    Height as of 10/7/23: 1.727 m (5' 8\").    Weight as of this encounter: 78.5 kg (173 lb 1.6 oz).       # " Financial/Environmental Concerns:              Past Medical History:   Diagnosis Date    Allergic rhinitis     Allergy, unspecified not elsewhere classified     Seasonal allergies, pollen, dust, smoke and animals    Antiplatelet or antithrombotic long-term use     Anxiety     Arthritis     Chest pain     Chronic sinusitis     Coronary atherosclerosis of unspecified type of vessel, native or graft     Coronary artery disease    Depressive disorder 1995    Gastroesophageal reflux disease 2020    Cleared with medication    Head injury 1954    Hiatal hernia 2015    Right Side    History of blood transfusion 12/15/2004    Prostate Surgery - My own blood    Hyperlipidemia     Hypertension     Inguinal hernia     Kidney problem 10/08/2017    Lithotripsy    Kidney stones     Malignant neoplasm of prostate (H)     Prostate cancer    Prostate cancer (H)     Syncope     Tonsil cancer (H)        Principal Problem:    Decreased appetite  Active Problems:    Hypokalemia        Current Facility-Administered Medications:     0.9% sodium chloride + KCl 20 mEq/L infusion, , Intravenous, Continuous, Gemini Pandya APRN CNP    potassium chloride 10 mEq in 100 mL sterile water infusion, 10 mEq, Intravenous, Q1H, Gemini Pandya APRN CNP, Last Rate: 100 mL/hr at 10/28/23 2047, 10 mEq at 10/28/23 2047    Current Outpatient Medications:     acetaminophen (TYLENOL) 325 MG tablet, Take 1-2 tablets (325-650 mg) by mouth every 6 hours as needed for mild pain, Disp: 40 tablet, Rfl: 0    citalopram (CELEXA) 40 MG tablet, TAKE 1 TABLET BY MOUTH ONCE DAILY, Disp: 90 tablet, Rfl: 0    DULoxetine (CYMBALTA) 60 MG capsule, Take 1 capsule (60 mg) by mouth daily, Disp: 90 capsule, Rfl: 3    famotidine (PEPCID) 20 MG tablet, Take 20 mg by mouth 2 times daily as needed (heartburn), Disp: , Rfl:     levothyroxine (SYNTHROID/LEVOTHROID) 100 MCG tablet, Take 100 mcg by mouth daily, Disp: , Rfl:     metoprolol succinate ER (TOPROL XL) 50 MG  24 hr tablet, Take 0.5 tablets (25 mg) by mouth daily, Disp: 90 tablet, Rfl: 3    omeprazole (PRILOSEC) 40 MG DR capsule, Take 1 capsule (40 mg) by mouth daily as needed (heartburn), Disp: , Rfl:     ondansetron (ZOFRAN ODT) 4 MG ODT tab, DISSOLVE 1 TABLET (4 MG) BY MOUTH EVERY 8 HOURS AS NEEDED FOR NAUSEA, Disp: 10 tablet, Rfl: 0    ondansetron (ZOFRAN) 4 MG tablet, Take 1 tablet (4 mg) by mouth every 8 hours as needed for nausea, Disp: 21 tablet, Rfl: 0    oxyCODONE (ROXICODONE) 5 MG tablet, TAKE 1-2 TABLETS (5-10 MG) BY MOUTH EVERY 6 HOURS AS NEEDED FOR SEVERE PAIN (LIMIT TO 6 TABLETS PER DAY) (Patient taking differently: Take 5-10 mg by mouth every 6 hours as needed for severe pain TAKE 1-2 TABLETS (5-10 MG) BY MOUTH EVERY 6 HOURS AS NEEDED FOR SEVERE PAIN (LIMIT TO 6 TABLETS PER DAY)), Disp: 120 tablet, Rfl: 0    pregabalin (LYRICA) 150 MG capsule, Take 1 capsule (150 mg) by mouth daily, Disp: 120 capsule, Rfl: 1    rosuvastatin (CRESTOR) 40 MG tablet, Take 1 tablet (40 mg) by mouth daily, Disp: 90 tablet, Rfl: 3    sennosides (SENOKOT) 8.6 MG tablet, Take 1-2 tablets by mouth 2 times daily as needed for constipation, Disp: 30 tablet, Rfl: 0    zolpidem (AMBIEN) 5 MG tablet, TAKE 1 TABLET (5 MG) BY MOUTH NIGHTLY AS NEEDED FOR SLEEP, Disp: 30 tablet, Rfl: 0    cetirizine (ZYRTEC) 10 MG tablet, Take 1 tablet (10 mg) by mouth daily, Disp: 30 tablet, Rfl: 1    naloxone (NARCAN) 4 MG/0.1ML nasal spray, Spray 1 spray (4 mg) into one nostril alternating nostrils as needed for opioid reversal every 2-3 minutes until assistance arrives, Disp: 0.2 mL, Rfl: 0    nitroGLYcerin (NITROSTAT) 0.4 MG sublingual tablet, For chest pain place 1 tablet under the tongue every 5 minutes for 3 doses. If symptoms persist 5 minutes after 1st dose call 911., Disp: 25 tablet, Rfl: 0    potassium chloride ER (K-TAB) 20 MEQ CR tablet, Take 1 tablet (20 mEq) by mouth daily, Disp: 30 tablet, Rfl: 0    Past Surgical History:   Procedure  Laterality Date    ARTHRODESIS FOOT  7/23/2013    Procedure: ARTHRODESIS FOOT;  Great Toe Arthrodesis Left Foot;  Surgeon: Ash Gonzalez DPM;  Location: [facility] OR    ARTHRODESIS FOOT  6/10/2014    Procedure: ARTHRODESIS FOOT;  Surgeon: Ash Gonzalez DPM;  Location: [facility] OR    BIOPSY  10/1/2004    dermatologist biopsies    COLONOSCOPY  10/7/2013    Procedure: COLONOSCOPY;  Colonoscopy;  Surgeon: Mike Fallon MD;  Location: [facility] GI    CYSTOSCOPY N/A 2/16/2022    Procedure: CYSTOSCOPY and bladder stone removal;  Surgeon: David Rogers MD;  Location: [facility]    ENDOSCOPIC RETROGRADE CHOLANGIOPANCREATOGRAM COMPLEX N/A 10/16/2023    Procedure: ENDOSCOPIC RETROGRADE CHOLANGIOPANCREATOGRAPHY, COMPLEX;  Surgeon: Trent Blood MD;  Location: [facility] OR    ENDOSCOPIC ULTRASOUND UPPER GASTROINTESTINAL TRACT (GI) N/A 10/16/2023    Procedure: Endoscopic ultrasound upper gastrointestinal tract (GI);  Surgeon: Trent Blood MD;  Location:  OR    ESOPHAGOSCOPY, GASTROSCOPY, DUODENOSCOPY (EGD), COMBINED N/A 2/12/2020    Procedure: ESOPHAGOGASTRODUODENOSCOPY (EGD);  Surgeon: Sam Escobar MD;  Location: [facility]    EXTRACORPOREAL SHOCK WAVE LITHOTRIPSY (ESWL) Bilateral 10/18/2017    Procedure: EXTRACORPOREAL SHOCK WAVE LITHOTRIPSY (ESWL);  BILATERAL EXTRACORPOREAL SHOCKWAVE LITHOTRIPSY ;  Surgeon: Meir Torres MD;  Location: [facility] OR    HC CORRECT BUNION,SIMPLE  08/11/2005    x3    HC REMV TOE BENIGN BONE LESN  08/11/2005    HEAD & NECK SURGERY  1954    From a fall    HERNIORRHAPHY INGUINAL  7/3/2013    Procedure: HERNIORRHAPHY INGUINAL;  Open Repair Inguinal hernia Right with mesh ;  Surgeon: Sam Escobar MD;  Location: [facility] OR    IR GASTROSTOMY TUBE PERCUTANEOUS PLCMNT  6/7/2021    LAPAROSCOPIC CHOLECYSTECTOMY N/A 10/18/2023    Procedure: CHOLECYSTECTOMY, LAPAROSCOPIC;  Surgeon: Dannie Warner MD;  Location:  OR    MOHS MICROGRAPHIC PROCEDURE  08/23/11    ear and chin-[facility]    OPEN REDUCTION INTERNAL FIXATION WRIST Right 7/18/2017    Procedure: OPEN REDUCTION INTERNAL FIXATION WRIST;  Right distal radius open reduction and internal fixation;  Surgeon: Pedro Blanca DO;  Location: [facility]    RECONSTRUCT FOREFOOT WITH METATARSOPHALANGEAL (MTP) FUSION  6/10/2014    Procedure: RECONSTRUCT FOREFOOT WITH METATARSOPHALANGEAL (MTP) FUSION;  Surgeon: Ash Gonzalez DPM;  Location: [facility] OR    STENT, CORONARY, DEIM      SURGICAL HISTORY OF -   1999/1974    lt knee    SURGICAL HISTORY OF -   10/2004    lithotripsy    SURGICAL HISTORY OF -   11/05    angiogram with stent    VASCULAR SURGERY  11/17/2005    Puncture of iliac artery during and angiogram    [facility] LAPAROSCOPY, SURGICAL PROSTATECTOMY, RETROPUBIC RADICAL, W/NERVE SPARING  11/30/2004    With full bilateral pelvic lymphadenectomy.  [facility].    [facility] TOTAL KNEE ARTHROPLASTY  05/01/08    Left knee       Allergies   Allergen Reactions    Animal Dander     Azithromycin Nausea and Vomiting    Dust Mites     Pollen Extract     Smoke.        Family History   Problem Relation Age of Onset    Hypertension Father         Lived to age 87    Connective Tissue Disorder Mother         LUPUS    Heart Disease Mother         Valve replacement    Anxiety Disorder Mother         Lived to age 84    Dementia Mother         Nursing Home (lived to age 86)       Social History     Socioeconomic History    Marital status:      Spouse name: Alessandra    Number of children: 2   Occupational History    Occupation: Retired   Tobacco Use    Smoking status: Never    Smokeless tobacco: Never   Vaping Use    Vaping Use: Never used   Substance and Sexual Activity    Alcohol use: Yes     Alcohol/week: 10.0 standard drinks of alcohol     Types: 10 Cans of beer per week     Comment: 1 hard  liquid and 1 wine or beer per day    Drug use: No    Sexual activity: Yes     Partners: Female     Birth control/protection: Female Surgical   Other Topics Concern     Parent/sibling w/ CABG, MI or angioplasty before 65F 55M? No       Physical Exam:  Vitals:    10/28/23 2030 10/28/23 2100 10/28/23 2106 10/28/23 2110   BP: 133/81 135/88  130/78   Pulse: 66 67 67 66   Resp: 15 (!) 8 20 22   Temp:       SpO2: 99% 98% 98% 99%   Weight:          /78   Pulse 66   Temp 97.4  F (36.3  C)   Resp 22   Wt 78.5 kg (173 lb 1.6 oz)   SpO2 99%   BMI 26.32 kg/m    Systolic (24hrs), Av , Min:128 , Max:140   Diastolic (24hrs), Av, Min:78, Max:88    No intake or output data in the 24 hours ending 10/28/23 2135    General:    not in acute distress, not in pain  Head:  atraumatic, normocephalic, face symmetrical  Eye:  conjunctivae clear , anicteric  Ear:  normal external ears, grossly normal hearing  Neck:  supple, no JVD  Respiratory:  no respiratory distress, no labored respirations, no shortness of breath,  Lung Sounds:  bilateral: clear to auscultation  Cardiovascular:normal rate, regular rhythm, PMI normal, S1 - normal, S2 - normal splitting  Neurological Status:  alert, awake, oriented to person, oriented to place, oriented to time  Skin:  normal turgor, warm  PSYCH - good eye contact appears euthymic       I&O: No intake/output data recorded.    Lab Results:   CBC:   Lab Results   Component Value Date    WBC 8.7 10/28/2023    WBC 6.6 2021    RBC 4.43 10/28/2023    RBC 3.25 2021     BMP:   Lab Results   Component Value Date    POTASSIUM 2.2 10/28/2023    POTASSIUM 4.3 12/15/2022    POTASSIUM 4.1 2021    CHLORIDE 106 10/28/2023    CHLORIDE 101 12/15/2022    CHLORIDE 100 2021    BUN 8.3 10/28/2023    BUN 10 12/15/2022    BUN 23 2021     CMP:  Lab Results   Component Value Date    POTASSIUM 2.2 10/28/2023    POTASSIUM 4.3 12/15/2022    POTASSIUM 4.1 2021    CHLORIDE 106 10/28/2023    CHLORIDE 101 12/15/2022    CHLORIDE 100 2021    ANIONGAP 13 10/28/2023    ANIONGAP 6 12/15/2022    ANIONGAP 6 2021    BUN 8.3 10/28/2023    BUN 10  "12/15/2022    BUN 23 07/02/2021    ALBUMIN 3.3 10/28/2023    ALBUMIN 2.6 12/15/2022    ALBUMIN 2.3 07/02/2021    AST 22 10/28/2023    AST 34 07/02/2021     Coagulation:   Lab Results   Component Value Date    INR 1.14 10/15/2023    INR 1.49 06/07/2021    PTT 35 10/07/2023    PTT 47 10/28/2017     Cardiac markers: No results found for: \"TROPONINI\"  ABGs: No results found for: \"PHARTERIAL\"  Mg: No components found for: \"MAGNESIUM\"  BNP: No results found for: \"BNP\"  Thyroid:   Lab Results   Component Value Date    TSH 0.06 08/08/2023    TSH 3.39 12/12/2022    TSH 4.33 10/20/2011               Assessment/Plan:      73 years old male with a past medical history of metastatic squamous cell carcinoma of the tonsil status post chemoradiation at [facility], liver metastasis ablation, hypertension, dyslipidemia, chronic kidney disease, coronary artery disease, recent hospitalization for gallstone pancreatitis status post ERCP with subsequent laparoscopic cholecystectomy on 10/18/2023 and multiple other medical issues presented to the emergency room with poor p.o. intake/generalized weakness.  He has been trying to drink more fluids including water and Gatorade.  Patient had some loose stools but not watery diarrhea.   During recent hospitalization, he completed a course of Cipro/Flagyl and was also supposed to be on a course of amoxicillin/potassium pills which she did not take since he could not tolerate larger pills.  Work-up in ED showed a potassium of 2.2 with a BUN of 8.3/creatinine 1.06.  Lipase was 132.  X-ray of the abdomen shows biliary stent in place and nonspecific gas pattern but cannot rule out an ileus.  Patient has good bowel movements at this time and  denies any abdominal pain nausea vomiting.  Patient was admitted for further evaluation    1 Severe hypokalemia at risk of cardiac arrhythmias.  Likely due to poor p.o. intake/GI loss.  Supplement with IV and eventually oral when able to tolerate.  Monitor on " telemetry and trend closely    2.  History of coronary artery disease.  Denies any cardiac symptoms at this time.  Resume the home metoprolol/statin when able to tolerate    3 Dyslipidemia resume home statin when able to handle proper p.o. intake    4 chronic kidney disease appears to be closer to baseline.  During Recent hospitalization creatinine was up to 1.98 but now it is 1.06.  IV fluids as above due to poor p.o. intake    5 recent hospitalization for enterococcus bacteremia.  Was on IV Zosyn and completed a course of Cipro/Flagyl.  Watch closely    6 recent hospitalization for choledocholithiasis status post ERCP with sphincterotomy and stent placement by GI 10/16/2023 status post laparoscopic cholecystectomy on 10/18/2023.  Denies any nausea vomiting/abdominal pain at this time.  Treatment is supportive    7 GI prophylaxis will be with Protonix    8.  History of metastatic squamous cell carcinoma of the tonsil status post chemoradiation at [facility], liver metastasis ablation. Appetite is poor and further affected by radiation to tonsil:can tolerate soft diet. Nutrition consult and trial of Remeron    Code status: Full    Patient be admitted under observation status    Our patient will be admitted under the hospitalist services and further management to be based on patient's clinical progress.    D/w care team and patient/spouse    As the provider for the telehealth service, I attest that I introduced myself to the patient and provided my credentials. Based on review of the patient s chart and/or a discussion with members of the patient s treatment team, I determined that telemedicine via a real-time, two-way, interactive audio and video platform is an appropriate means of providing this service. The patient and I mutually agreed that this visit is appropriate for telemedicine as well.    The patient was located at [facility]. IValentino was at [place], [place]. The encounter was  approximately 11 minutes. The nurse was present for the duration of the encounter.    Chart reviewed,labs and available imaging reviewed,consultant notes reviewed. Previous available medical records have been reviewed  Care plan discussed with care team    I have seen and examined the patient today. Documentation for this visit was completed using a template. Everything documented was personally performed today with the necessary additions, deletions and changes made as appropriate with the diagnoses being listed in no particular order of clinical relevance.    Annie Pandya MD MPh  Securely message with the Vocera Web Console (learn more here)  Text page via [facility] Paging/Directory

## 2023-10-29 NOTE — PLAN OF CARE
Goal Outcome Evaluation:      Plan of Care Reviewed With: patient, spouse    Overall Patient Progress: improvingOverall Patient Progress: improving    Outcome Evaluation: Potassium was replaced on days for level of 2.7, and recheck was 2.9. Decided to try first dose of next replacement in the oral liquid form. It burned his throat a bit, so diluted it with his Sprite and he was able to slowly drink it. Voiding and had another BM. Had some pudding for supper, but refused offers of anything else. SR per telemetry. Blood sugar was 92.

## 2023-10-29 NOTE — CONSULTS
"Care Management Initial Consult    General Information  Assessment completed with: Patient, \"New\"  Type of CM/SW Visit: Offer D/C Planning    Primary Care Provider verified and updated as needed: Yes   Readmission within the last 30 days:  Yes:   10/15/2023 - 10/19/2023 (4 days)  [facility]       Reason for Consult: elevated risk of readmission    Advance Care Planning: Advance Care Planning Reviewed: present on chart        Communication Assessment  Patient's communication style: spoken language (English or Bilingual)    Hearing Difficulty or Deaf: no   Wear Glasses or Blind: no    Cognitive  Cognitive/Neuro/Behavioral: WDL                      Living Environment:   People in home: spouse  Cheri  Current living Arrangements: house (split-level home)      Able to return to prior arrangements: yes     Family/Social Support:  Care provided by: spouse/significant other  Provides care for: no one  Marital Status:   Wife -Cheri       Description of Support System: Supportive, Involved    Support Assessment: Adequate family and caregiver support, Adequate social supports (Wife is a retired RN)    Current Resources:   Patient receiving home care services: No     Community Resources: None  Equipment currently used at home: walker, rolling  Supplies currently used at home: None    Employment/Financial:  Employment Status: retired        Financial Concerns: none      Does the patient's insurance plan have a 3 day qualifying hospital stay waiver?  No    Lifestyle & Psychosocial Needs:  Social Determinants of Health     Food Insecurity: Not on file   Depression: Not at risk (3/9/2023)    PHQ-2     PHQ-2 Score: 2   Housing Stability: Not on file   Tobacco Use: Low Risk  (10/19/2023)    Patient History     Smoking Tobacco Use: Never     Smokeless Tobacco Use: Never     Passive Exposure: Not on file   Financial Resource Strain: Not on file   Alcohol Use: Not on file   Transportation Needs: Not on " file   Physical Activity: Not on file   Interpersonal Safety: Not on file   Stress: Not on file   Social Connections: Not on file       Functional Status:  Prior to admission patient needed assistance:   Dependent ADLs:: Independent  Dependent IADLs:: Independent     Mental Health Status:  Mental Health Status: No Current Concerns       Chemical Dependency Status:  Chemical Dependency Status: No Current Concerns           Values/Beliefs:  Spiritual, Cultural Beliefs, Mosque Practices, Values that affect care: no (Shital)               Additional Information:  Referral received due to Elevated Risk Score and recent inpatient hospital stay within past 30 days.     10/15/2023 - 10/19/2023 (4 days)  [facility]    CM met with the Pt to introduce self/role, complete assessment and discuss and offer services.     Pt reported he lives with his wife, who is a retired RN. She is home with him 24/7 and can assist with cares as needed. She has been driving him to his appointments without concern.     Pt discussed his recent hospital stay at [facility] for his gallbladder.  Reports he is scheduled for another procedure (stent placement)-however expects it will be outpatient.    He and his wife are able to drive. States he can get himself to town when needed. Prefers his wife to drive longer distances. Does not use a walker but has adjusted the home to accommodate his needs. They have a split level home so has moved what is needed to avoid managing the stairs less often.     CM discussed having home care or additional community based resources. Pt denied needing help at this time. Acknowledged the Clinic Care Coordinator had called them recently. They had also declined at that time.     Pt stated they would reach out if they change their minds or if his condition changes to the point where his wife cannot support his needs alone.     PLAN: Discharge to home with continued help/care from wife Cheri.      Family to assist with transport.     [facility]-referral to assist with follow up PCP appointment     Pt agreed to a Clinic Care Coordination follow-up should their needs change once they return home.     LYN Schwarz  [facility] [phone]   [facility]  [phone]

## 2023-10-29 NOTE — PLAN OF CARE
Goal Outcome Evaluation:      Plan of Care Reviewed With: patient, spouse    Overall Patient Progress: improvingOverall Patient Progress: improving    Outcome Evaluation: Pt up with SBA or IND without fluids to bathroom. Pt has chronic pain PRN oxy given today. Replaced pot x6 redraw at 345pm. Lap sites from previous surgery noted on abdomen. Alert and orientated x4, lung sounds clear .

## 2023-10-29 NOTE — MEDICATION SCRIBE - ADMISSION MEDICATION HISTORY
Medication Scribe Admission Medication History    Admission medication history is complete. The information provided in this note is only as accurate as the sources available at the time of the update.    Information Source(s): Family member Spouse via in-person    Pertinent Information: patient's family reports that patient did not complete course of Amoxicillin 875 mg course of antibiotics, medication was stopped on 10/25/23. Patient reports that he has a hard time taking his potassium due to such a large tablet and would like to receive suspension or liquid if possible.     Changes made to PTA medication list:  Added: None  Deleted: Oxycodone 5 mg - current entry already exists on med list   Changed: None    Medication Affordability:  Not including over the counter (OTC) medications, was there a time in the past 3 months when you did not take your medications as prescribed because of cost?: No    Allergies reviewed with patient and updates made in EHR: yes    Medication History Completed By: KIERA NGUYEN 10/28/2023 7:57 PM    PTA Med List   Medication Sig Last Dose    acetaminophen (TYLENOL) 325 MG tablet Take 1-2 tablets (325-650 mg) by mouth every 6 hours as needed for mild pain More than a month at on hand    citalopram (CELEXA) 40 MG tablet TAKE 1 TABLET BY MOUTH ONCE DAILY 10/28/2023 at am    DULoxetine (CYMBALTA) 60 MG capsule Take 1 capsule (60 mg) by mouth daily 10/28/2023 at am    famotidine (PEPCID) 20 MG tablet Take 20 mg by mouth 2 times daily as needed (heartburn) 10/28/2023 at am    levothyroxine (SYNTHROID/LEVOTHROID) 100 MCG tablet Take 100 mcg by mouth daily 10/28/2023 at am    metoprolol succinate ER (TOPROL XL) 50 MG 24 hr tablet Take 0.5 tablets (25 mg) by mouth daily 10/28/2023 at am    omeprazole (PRILOSEC) 40 MG DR capsule Take 1 capsule (40 mg) by mouth daily as needed (heartburn) More than a month at on hand    ondansetron (ZOFRAN ODT) 4 MG ODT tab DISSOLVE 1 TABLET (4 MG) BY MOUTH EVERY  8 HOURS AS NEEDED FOR NAUSEA 10/28/2023 at pm    ondansetron (ZOFRAN) 4 MG tablet Take 1 tablet (4 mg) by mouth every 8 hours as needed for nausea Unknown at unknown    oxyCODONE (ROXICODONE) 5 MG tablet TAKE 1-2 TABLETS (5-10 MG) BY MOUTH EVERY 6 HOURS AS NEEDED FOR SEVERE PAIN (LIMIT TO 6 TABLETS PER DAY) (Patient taking differently: Take 5-10 mg by mouth every 6 hours as needed for severe pain TAKE 1-2 TABLETS (5-10 MG) BY MOUTH EVERY 6 HOURS AS NEEDED FOR SEVERE PAIN (LIMIT TO 6 TABLETS PER DAY)) 10/28/2023 at pm    pregabalin (LYRICA) 150 MG capsule Take 1 capsule (150 mg) by mouth daily 10/28/2023 at am    rosuvastatin (CRESTOR) 40 MG tablet Take 1 tablet (40 mg) by mouth daily 10/28/2023 at am    sennosides (SENOKOT) 8.6 MG tablet Take 1-2 tablets by mouth 2 times daily as needed for constipation Past Month at unknown    zolpidem (AMBIEN) 5 MG tablet TAKE 1 TABLET (5 MG) BY MOUTH NIGHTLY AS NEEDED FOR SLEEP 10/27/2023 at hs

## 2023-10-29 NOTE — PROGRESS NOTES
S-(situation): Patient arrives to room 249 at 2200 via cart from ED.    B-(background): Decreased appetite and dehydration.     A-(assessment): Patient AOx4. Pleasant and cooperative. VSS on RA. Lung sounds clear, abdomen soft and non tender. Abdominal incision intact and dry. Bowel sounds present. +1 edema on bilateral feet, no tenderness or numbness reported. SBA to bathroom. Denies pain and nausea.     R-(recommendations): Orders reviewed with patient and wife. Will monitor patient per MD orders.     Inpatient nursing criteria listed below were met:    Health care directives status obtained and documented: Yes  VTE ordered/documented: Yes, Lovenox  Skin issues/needs documented:NA  Isolation addressed and Signage used: NA  Fall Prevention: Care plan updated Yes Education given and documented Yes  Care Plan initiated and Co-Morbidities added: Yes  Education Assessment documented:Yes  Admission Education Documented: Yes  If present CAUTI/CLABI Education done: NA  New medication patient education completed and documented (Possible Side Effects of Common Medications handout): Yes  Allergies Reviewed: Yes  Admission Medication Reconciliation completed: No, done ate ER  Home medications if not able to send immediately home with family stored here: NA  Reminder note placed in discharge instructions regarding home meds: NA  Individualized care needs/preferences addressed and charted: Yes  Provider Notified that patient has arrived to the unit: Yes

## 2023-10-29 NOTE — PROGRESS NOTES
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  Since returning home patient has had significant difficulty with swallowing solid foods and larger pills and has had difficulty keeping himself hydrated with progressive weakness noted.  On ED evaluation he was found to have a potassium of 2.2 but other electrolytes and renal function normal but patient not tolerating p.o. intake and he was admitted for ongoing management.    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  Since returning home from the hospital on 10/19/2023 he has had progressively increasing difficulty in his swallowing ability and is often either not able to swallow solid foods or large pills or will bring them back up as they get stuck in his upper throat.    Plan:   -Proceed with CT scan of the neck and chest to evaluate for any mass  or structural component that may be contributing to his inability to swallow solid food  -Change diet to soft bite sized food and monitor for tolerance  -Speech therapy when available on 10/31/2023 with consideration of video swallow study if appropriate  -Nursing may crush pills if needed.  -Continue with IV fluids and monitor strict intake and output to assess overall volume needs  -Nutritionist consult placed to assist with ensuring patient receives his nutritional goals daily    Active Problems:    Hypokalemia    Assessment: Patient diagnosed with hypokalemia during hospitalization on 10/7/2023 which was suspected to be caused by poor oral intake, nausea, and diarrhea.  Patient discharged from the last 2 hospitalizations with potassium replacement however patient has not been able to take this due to inability to swallow the large pill since his discharge on 10/19/2023.  Potassium 2.2 on presentation for this hospital stay, has increased up to 2.9 with high potassium replacement protocol    Plan:   -Given patient's ongoing and minimal p.o. intake we will add potassium chloride to patient's IV fluids in addition to continuation of high protocol replacement      Squamous cell carcinoma of left tonsil     Metastatic cancer to liver s/p liver ablation on 9/19/23    Assessment: Patient status postchemotherapy and radiation of the initial mass in 2021 following which patient did have malnutrition and inability to safely swallow and required G-tube feeding for period of time along with speech therapy evaluation and strengthening.  Initial mass responded well to treatment however since that time patient has had intermittent metastatic disease show up in his neck, in the rib, and most recently in the liver.  All have responded well to medical management.  Liver ablation underwent at [facility] on 9/19/2023 for most recent metastatic spot    Plan:   -Proceed with CTA of the chest abdomen and pelvis as outlined  above  -Anticipate outpatient follow-up with oncology team at [facility] for consideration of PET scan in November/December as previously planned  -Speech therapy evaluation as outlined above      Recent enterococcus bacteremia 9/27/23 acute cholecystitis and gallstone pancreatitis related to choledocholithiasis S/P ERCP, sphincterotomy with stent placement, lap cholecystectomy 10/16/23-10/18/23    Assessment: Patient hospitalized from 10/7/2023 through 10/10/2023 and again 10/16/2023 through 10/19/2023 due to the above condition.  He has now completed a prolonged course of initially oral Augmentin followed by Cipro and Flagyl with recent blood cultures negative.    Plan:   -Repeat blood cultures today to ensure ongoing resolution of bacteremia as patient did stop his oral Augmentin several days early due to an inability to swallow the pills.  Of note on review of initial positive blood culture on 9/27/2023 the organism was also sensitive to ciprofloxacin which patient has continued since recent hospital discharge and therefore risk of ongoing bacteremia is felt very low and no further antibiotics will be prescribed at this time unless patient has signs concerning for infection      Coronary atherosclerosis Status post insertion of drug-eluting stent into left anterior descending artery for coronary artery disease    Assessment: Previous history without any recent signs concerning for angina.  Patient normally on metformin XL 50 mg daily, Crestor 40 mg daily.  Has not been on any aspirin.      Plan:   -Hold metoprolol due to low normal blood pressures and poor oral intake  -Continue Crestor as per home regimen      Hypertension goal BP (blood pressure) < 140/90    Assessment: Patient is normally on metoprolol 25 mg which has been held since admission due to low normal blood pressures and poor oral intake.  Blood pressures have been in the 110-120 range even as oral metoprolol has been held since admission    Plan:  "  -Continue to hold metoprolol and monitor for blood pressure stability      Chronic kidney disease, stage 3 (H)    Assessment: Present at baseline with apparent recent creatinine of 1.1-1.4.  Is stable at previous baseline    Plan:   -Continue with IV fluids given poor oral intake and monitor for creatinine stability during this stay      Hypothyroidism due to acquired atrophy of thyroid    Assessment: On levothyroxine 100 mcg daily    Plan:   -Continue home regimen without change      Anxiety    Assessment: Patient has been on Celexa already milligrams daily and feels symptoms have overall been well controlled    Plan:   -Restart home regimen without change      Hyperlipidemia LDL goal <130    Assessment: Patient on Crestor 40 mg daily    Plan:   -Continue home regimen without change        Diet: Soft & Bite Sized Diet (level 6) Thin Liquids (level 0)    DVT Prophylaxis: Enoxaparin (Lovenox) SQ  Stone Catheter: Not present  Lines: None     Cardiac Monitoring: ACTIVE order. Indication: Tachyarrhythmias, acute (48 hours)  Code Status: Full Code      Clinically Significant Risk Factors Present on Admission        # Hypokalemia: Lowest K = 2.2 mmol/L in last 2 days, will replace as needed    # Hypercalcemia: corrected calcium is >10.1, will monitor as appropriate    # Hypoalbuminemia: Lowest albumin = 2.7 g/dL at 10/29/2023  5:28 AM, will monitor as appropriate     # Hypertension: Noted on problem list      # Overweight: Estimated body mass index is 25.56 kg/m  as calculated from the following:    Height as of this encounter: 1.727 m (5' 8\").    Weight as of this encounter: 76.2 kg (168 lb 1.6 oz).       # Financial/Environmental Concerns: none         Disposition Plan      Expected Discharge Date: 10/30/2023      Destination: home with family              Neema Sherlyn Beckett MD  Hospitalist Service  [facility]  Securely message with [employer] (more info)  Text page via [employer] " [place]/Directory   ______________________________________________________________________    Interval History   Patient has been vitally stable and normal without fever.  He denies any nausea, vomiting, significant abdominal pain.  Continues to have intermittent loose stools.  Attempted regular food this morning and it did not go well however liquids continue to do fairly well but patient is not taking them in high volume.  He reports that food and large pills get stuck in his mouth as he is attempting to swallow them and he cannot get them to go down his throat and when they do they feel like they get stuck and come back out.  No other new symptoms.  No new nursing concerns.    Physical Exam   Vital Signs: Temp: 98.7  F (37.1  C) Temp src: Oral BP: 115/67 Pulse: 79   Resp: 20 SpO2: 97 % O2 Device: None (Room air)    Weight: 168 lbs 1.6 oz    Constitutional: awake, alert, cooperative, no apparent distress, and appears stated age  Respiratory: No increased work of breathing, good air exchange, clear to auscultation bilaterally, no crackles or wheezing  Cardiovascular: Regular rate and rhythm without murmur  GI: Bowel sounds are present in all 4 quadrants, abdomen is obese but soft and nondistended.  Patient does have very mild reported tenderness on palpation but not localized and states it is much better than it was last week  Skin: Incisions from surgical procedures last week are still covered in Steri-Strips however there is no significant erythema, warmth, or tenderness on palpation of these areas.  Some bruising noted surrounding some of the sites but none expansive in nature.  Neurologic: Awake, alert, oriented to name, place and situation    Medical Decision Making     60 MINUTES SPENT BY ME on the date of service doing chart review, history, exam, documentation & further activities per the note.      Data     I have personally reviewed the following data over the past 24 hrs:    10.1  \   11.2 (L)   / 223      138 111 (H) 7.6 (L) /  97   2.9 (L) 15 (L) 1.10 \     ALT: 13 AST: 17 AP: 318 (H) TBILI: 0.6   ALB: 2.7 (L) TOT PROTEIN: 5.5 (L) LIPASE: N/A     Trop: N/A BNP: 683     Procal: N/A CRP: N/A Lactic Acid: 1.2

## 2023-10-30 PROBLEM — E83.39 HYPOPHOSPHATEMIA: Status: ACTIVE | Noted: 2023-01-01

## 2023-10-30 PROBLEM — R79.89 ELEVATED LACTIC ACID LEVEL: Status: ACTIVE | Noted: 2023-01-01

## 2023-10-30 PROBLEM — R93.5 ABNORMAL ABDOMINAL CT SCAN: Status: ACTIVE | Noted: 2023-01-01

## 2023-10-30 PROBLEM — R93.89 ABNORMAL CHEST CT: Status: ACTIVE | Noted: 2023-01-01

## 2023-10-30 PROBLEM — D64.9 ANEMIA, UNSPECIFIED TYPE: Status: ACTIVE | Noted: 2023-01-01

## 2023-10-30 NOTE — PROGRESS NOTES
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      Principal Problem:    Inadequate oral nutritional intake  Active Problems:    Hypertension goal BP (blood pressure) < 140/90    Chronic kidney disease, stage 3 (H)    Hypokalemia    Metastatic cancer to liver (H)    History of bacteremia-Enterococcus Sept 2023    S/P ERCP, sphincterotomy with stent placement, lap cholecystectomy 10/16-10/18/23    Hypophosphatemia    Hypoalbuminemia    Anemia, unspecified type    Elevated lactic acid level    Abnormal chest CT-RLL opacity    Abnormal abdominal CT scan-new extrahepatic 2 cm low attenuation area with fat stranding    Hyperlipidemia LDL goal <130    Coronary atherosclerosis    Status post insertion of drug-eluting stent into left anterior descending artery for coronary artery disease    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  Since hospital discharge on 10/19/2023, he had progressively increasing swallowing difficulty and has either not been able to swallow solid foods or large pills or has had to expectorate them shortly after trying to swallow because they get stuck in his upper throat.  As a consequence, his oral nutritional intake has been inadequate.  No gross causative abnormality noted on exam or radiographically after CT scan of the neck and chest.  Serum albumin was low at admission and continues to be low likely due to inadequate nutritional intake.  Oral intake is significantly improved on 10/30.  -Continue altered diet of soft bite sized food as tolerated until formal speech-language pathology evaluation when available on 10/31/2023 with consideration of video swallow study if appropriate  -Nursing may crush pills and or dissolve in liquid if needed  -discontinue IV fluids  -Registered dietitian assist withing recommendations for optimizing nutritional status  -Monitor serum albumin as indicated    Hypokalemia  Hypophosphatemia  He had hypokalemia during hospitalization on 10/7/2023 that was attributed to poor oral intake, nausea, and diarrhea.  Patient discharged from the last 2 hospitalizations in October with potassium supplementation at home which the patient has not tolerated due to inability to swallow the large pill.  Admitted with potassium 2.2 and has now normalized after additional potassium supplementation.  Noted to have hypophosphatemia likely due to inadequate oral nutritional intake and which could contribute to weakness.  -Discontinuing IV fluids  -Monitor potassium as indicated and continue to provide potassium supplementation per protocol  -Resume scheduled potassium supplementation  -Ordered phosphorus supplementation per standard protocol today  -Discontinue cardiac  monitoring    Squamous cell carcinoma of left tonsil   Metastatic cancer to liver s/p ablation of liver metastasis on 9/19/23  Abnormal abdomen CT with area of extrahepatic hypoattenuation  He is status post chemoradiation of left tonsillar  mass in 2021.  At that time, he had malnutrition from inability to swallow safely and required G-tube feeding for period of time along with speech therapy evaluation and strengthening.  Initial mass responded well to treatment however since that time patient has had intermittent metastatic disease show up in his neck, in the rib, and most recently in the liver.  All have responded well to medical management.  He underwent ablation of liver metastasis at [facility] on 9/19/2023.  On abdomen CT during this hospital stay, he was found to have signs of previous ablation and liver mets but finding of 2 cm region of extrahepatic hypoattenuation raising concern for possibility of phlegmon or abscess which is new as compared with October 14.  It is unclear whether this radiographic abnormality is related to previous ablation of hepatic metastasis or more recent laparoscopic cholecystectomy.  Clinically, active infection is not suspected nor is he having worsening abdominal pain.  -Continue clinical monitoring  -If he were to develop fever or other signs of SIRS or worsening abdominal symptoms, would consider additional diagnostic evaluation of this radiographic abnormality  -Anticipate outpatient follow-up with oncology team at [facility] in November/December as previously planned    Abnormal chest CT  Chest CT this hospitalization demonstrates opacity in the right lower lobe consistent with possible pneumonitis.  Clinically, pneumonia is not suspected at this time.  Recent aspiration is possible.  He is not having significant respiratory symptoms at this time.  -Continue clinical monitoring  -If he develops fever or worsening respiratory symptoms, consider additional evaluation  and/or treatment for pneumonia including aspiration pneumonia    Enterococcus bacteremia 9/27/23   Acute cholecystitis and gallstone pancreatitis related to choledocholithiasis S/P ERCP, sphincterotomy with stent placement, and lap cholecystectomy 10/15/23-10/18/23  Patient hospitalized from 10/7/2023 through 10/10/2023 and again 10/15/2023 through 10/19/2023 due to symptomatic choledocholithiasis with gallstone pancreatitis and acute cholecystitis for which she had invasive intervention including sphincterotomy with common bile duct stent placement and laparoscopic cholecystectomy.  He had completed a prolonged course of antibiotic treatment with recent blood cultures negative.  He has not had fever or other signs of infection during this hospitalization.  Blood cultures obtained 10/29 are pending and he is not currently receiving antibiotics.  -Continue clinical monitoring, reconsider empiric antibiotic therapy if there is increasing concern for infection    Coronary atherosclerosis Status post insertion of drug-eluting stent into left anterior descending artery for coronary artery disease  Previous history of CAD and coronary artery stent without any recent symptoms or signs signs concerning for angina.  Patient chronically treated with on metoprolol XL 25 mg daily and Crestor 40 mg daily but not aspirin.    -Holding metoprolol for now due to low normal blood pressures and poor oral intake but anticipate restarting it as blood pressure trends upward  -Continue chronic dose of Crestor    Hypertension goal BP (blood pressure) < 140/90  Chronic hypertension is normally treated with metoprolol XL 25 mg daily which has been held since admission due to low normal blood pressures and poor oral intake.  He has been normotensive.  -Continue to hold metoprolol and monitor for blood pressure stability    Chronic kidney disease, stage 3  Baseline creatinine of 1.1-1.4 concerning for stage III chronic kidney disease.  Renal  "function is stable compared with this baseline.  -discontinue IV fluids and ordered recheck of renal function    Hypothyroidism due to acquired atrophy of thyroid  Chronic hypothyroidism appears stable on chronic dose of levothyroxine 100 mcg daily  -Continue chronic regimen     Anxiety  Chronic anxiety has been stable on chronic dose of Celexa   -Continue chronic dose of Celexa    Hyperlipidemia LDL goal <130  Chronic hyperlipidemia normally treated with Crestor 40 mg daily  -Continue chronic dose of Crestor          Diet: Soft & Bite Sized Diet (level 6) Thin Liquids (level 0)  Snacks/Supplements Adult: Ensure Enlive; With Meals    DVT Prophylaxis: Enoxaparin (Lovenox) SQ  Stone Catheter: Not present  Lines: None     Cardiac Monitoring: ACTIVE order. Indication: Tachyarrhythmias, acute (48 hours)  Code Status: Full Code      Clinically Significant Risk Factors        # Hypokalemia: Lowest K = 2.2 mmol/L in last 2 days, will replace as needed    # Hypercalcemia: corrected calcium is >10.1, will monitor as appropriate    # Hypoalbuminemia: Lowest albumin = 2.4 g/dL at 10/30/2023  6:18 AM, will monitor as appropriate     # Hypertension: Noted on problem list        # Overweight: Estimated body mass index is 25.56 kg/m  as calculated from the following:    Height as of this encounter: 1.727 m (5' 8\").    Weight as of this encounter: 76.2 kg (168 lb 1.6 oz)., PRESENT ON ADMISSION     # Financial/Environmental Concerns: none         Disposition Plan     Expected Discharge Date: 10/31/2023      Destination: home with family              Pedro Guillaume MD  Hospitalist Service  [facility]  Securely message with [employer] (more info)  Text page via [facility] Paging/Directory   ______________________________________________________________________    Interval History   He feels better today.  Throat continues to be somewhat sore.  After taking potassium liquid supplement, he noted some swelling of " his left upper lip.  His lip has not been itchy or particularly painful.  He is not having swallowing problems today and feels like swallowing function has recovered to his usual baseline although he has been avoiding swallowing larger pills.  His stomach feels better today as well.  He is not nauseated and his appetite is better.  Chronic abdominal pain is about the same as his usual baseline and no worse than usual.  He is having frequent bowel movements.  He is voiding without difficulty.  He has not had fever.  He has been hemodynamically stable.  He has slight cough but denies any shortness of breath.  He has not required oxygen supplementation.  He consumed almost a liter of fluids at lunch including tomato soup, much better intake than previously.    Physical Exam   Vital Signs: Temp: 98.2  F (36.8  C) Temp src: Oral BP: 131/78 Pulse: 92   Resp: 20 SpO2: 97 % O2 Device: None (Room air)    Patient Vitals for the past 24 hrs:   BP Temp Temp src Pulse Resp SpO2   10/30/23 0815 131/78 98.2  F (36.8  C) Oral 92 20 97 %   10/30/23 0400 124/76 98.4  F (36.9  C) Oral 85 18 95 %   10/30/23 0040 116/65 97.7  F (36.5  C) Oral 82 18 95 %   10/29/23 1947 124/77 98.9  F (37.2  C) Oral 81 18 97 %     Weight: 168 lbs 1.6 oz  Wt Readings from Last 4 Encounters:   10/28/23 76.2 kg (168 lb 1.6 oz)   10/19/23 85.9 kg (189 lb 6.4 oz)   10/14/23 81.2 kg (179 lb)   10/10/23 83 kg (183 lb)       Intake/Output Summary (Last 24 hours) at 10/30/2023 1507  Last data filed at 10/30/2023 1400  Gross per 24 hour   Intake 3708 ml   Output 2025 ml   Net 1683 ml       General Appearance: Chronically ill-appearing man without signs of acute distress sitting up in bed  Respiratory: Normal respiratory effort, clear lungs  Cardiovascular: Regular rate and rhythm, good radial pulse, normal capillary refill, trace peripheral edema in the lower legs  GI: Hypoactive bowel sounds, nondistended abdomen, soft, no abdominal tenderness  Skin: Mild  asymmetric swelling of the left upper lip, no urticaria or erythema, no other rash, generally pale color  Throat: Mild erythema of the posterior pharynx, no focal lesions and no exudates    Medical Decision Making       74 MINUTES SPENT BY ME on the date of service doing chart review, history, exam, documentation & further activities per the note.      Data     I have personally reviewed the following data over the past 24 hrs:    7.8  \   9.9 (L)   / 197     136 112 (H) 6.6 (L) /  93   3.7 13 (L) 1.00 \     ALT: 13 AST: 21 AP: 286 (H) TBILI: 0.4   ALB: 2.4 (L) TOT PROTEIN: 5.0 (L) LIPASE: N/A     Procal: N/A CRP: N/A Lactic Acid: 1.2         Magnesium 2.0, phosphorus 1.7  Blood cultures from yesterday are pending    Cardiac monitor results reviewed over the past 24 hours: Normal sinus rhythm, no arrhythmias    Imaging results reviewed over the past 24 hrs:   Recent Results (from the past 24 hour(s))   CT Chest/Abdomen/Pelvis w Contrast    Narrative    EXAM: CT CHEST/ABDOMEN/PELVIS W CONTRAST  LOCATION: [facility]  DATE: 10/29/2023    INDICATION: History of squamous cell carcinoma of the tonsils with metastases to the liver status post ablation in September 2023, status post ERCP and laparoscopic cholecystectomy for bacteremia caused by retained stone. Now with inability to swallow.   Possible ileus on x ray  COMPARISON: CT abdomen/pelvis 10/14/2023, CT chest 09/27/2023, PET/CT 08/30/2022  TECHNIQUE: CT scan of the chest, abdomen, and pelvis was performed following injection of IV contrast. Multiplanar reformats were obtained. Dose reduction techniques were used.   CONTRAST: ISOVUE 370, 70 mL    FINDINGS:   LUNGS AND PLEURA: Minimal patchy groundglass opacity in the right lower lobe. Linear scarring in the right lower lobe. No pleural effusion or pneumothorax.    MEDIASTINUM/AXILLAE: No lymphadenopathy. No pericardial effusion.    CORONARY ARTERY CALCIFICATION: Previous intervention  (stents or CABG).    HEPATOBILIARY: Redemonstrated hepatic segment 8 lesion status post ablation, with cavity measuring 6.3 x 5.1 cm, containing hypodense and hyperdense contents. There is a new extrahepatic low-attenuation area measuring 2.0 x 1.5 cm. There is mild adjacent   fat stranding. A hepatic segment 8 low attenuation lesion measures 0.8 cm (series 5, image 123), previously 0.4 cm on 09/27/2023. A hepatic segment 5 low-attenuation lesion measures 1.6 x 1.5 cm (series 5, image 161), previously 1.3 x 1.3 cm on   09/27/2023. Another hepatic segment 5 low-attenuation lesion measures 2.4 x 2.2 cm (series 5, image 165), previously 3.3 x 3.0 cm on 09/27/2023, likely also status post ablation. Status post cholecystectomy. Biliary stent in place. Pneumobilia in the   left hepatic lobe, expected given presence of biliary stent. Mild localized biliary dilatation peripheral to the hepatic segment 5 lesion. No additional biliary dilatation.    PANCREAS: Normal.    SPLEEN: Normal.    ADRENAL GLANDS: Normal.    KIDNEYS/BLADDER: Few nonobstructing renal calculi bilaterally measuring up to 0.3 cm. No hydronephrosis. Urinary bladder appears normal.    BOWEL: Mild colonic diverticulosis. No obstruction or inflammatory change. Appendix not visualized, although no secondary signs of acute appendicitis.    LYMPH NODES: No lymphadenopathy.    VASCULATURE: Mild to moderate atherosclerotic calcifications. Right external iliac artery stent.    PELVIC ORGANS: Status post prostatectomy.    MUSCULOSKELETAL: Redemonstrated 0.9 cm sclerotic lesion in T4, unchanged from prior, but new since PET/CT dated 08/30/2022. Redemonstrated ill-defined sclerosis in the right posterior seventh rib. Fat-containing left inguinal hernia.      Impression    IMPRESSION:  1.  Minimal patchy groundglass opacity in the right lower lobe, either pneumonia, aspiration, or other nonspecific pneumonitis.    2.  Redemonstrated hepatic segment 8 lesion status post  ablation. New adjacent extrahepatic 2.0 cm low-attenuation area with surrounding fat stranding, possibly representing phlegmon or abscess. Abscess within the ablation cavity also not excluded.    3.  Hepatic segment 5 lesion status post ablation, decreased in size since 09/27/2023. There is mild biliary dilatation peripheral to this lesion.     4.  A few smaller hepatic lesions have slightly increased in size since 09/27/2023.    5.  Status post cholecystectomy. Biliary stent in place. Expected pneumobilia. No additional biliary dilatation.    6.  No bowel obstruction or ileus.    7.  Redemonstrated sclerotic lesions in T4 and the right posterior seventh rib, likely metastatic.   CT Soft Tissue Neck w Contrast    Narrative    EXAM: CT SOFT TISSUE NECK W CONTRAST  LOCATION: [facility]  DATE: 10/29/2023    INDICATION:  Hx of squamous cell carcinoma of the tonsil s/p treatment but with recurrent inability to swallow solid foods for past few weeks, weight loss  COMPARISON: CT neck 8/30/2022 and PET CT 09/03/2021, 08/30/2022   CONTRAST:  Isovue 370, 120 mL  TECHNIQUE: Routine CT Soft Tissue Neck with IV contrast. Multiplanar reformats. Dose reduction techniques were used.    FINDINGS:     MUCOSAL SPACES/SOFT TISSUES: Allowing for beam hardening from dental amalgam, no convincing new nodular focus of enhancement in the left glossotonsillar sulcus. Similar slight improved mild diffuse mucosal thickening in the oropharynx and hypopharynx   since 08/30/2022, likely reflective of posttreatment change. No new nodular area of enhancement. Similar mild thickening of the aryepiglottic folds.    The oral cavity is partially obscured by streak artifact from dental amalgam/restorations. Visualized floor of mouth structures are unremarkable.    LYMPH NODES: The previously seen FDG avid left level II lymph node is significantly decreased in size now measuring 5 mm in AP dimension, previously 13 mm. No  new pathologic lymph nodes by size or morphology criteria.     SALIVARY GLANDS:  Unremarkable parotid and submandibular glands.    THYROID: Normal.     VESSELS: Vascular structures of the neck are patent.    VISUALIZED INTRACRANIAL/ORBITS/SINUSES: No acute abnormality of the visualized intracranial compartment or orbits. Chronic right lamina papyracea deformity. Visualized paranasal sinuses and mastoid air cells are clear.    OTHER: Multilevel cervical spondylosis. Interval development of a sclerotic T4 vertebral body lesion since 08/30/2022. The included lung apices are clear.      Impression    IMPRESSION:   1.  No CT neck findings to explain the patient's swallowing difficulty or weight loss.  2.  Redemonstration of posttreatment change in the neck without findings to suggest local or otto recurrence.   *  Positive treatment response with decreased size of the known metastatic left level II lymph node.  *  Interval development of a sclerotic T4 vertebral body lesion since 08/30/2022, possible osseous metastasis.     Recent Labs   Lab 10/30/23  1254 10/30/23  1201 10/30/23  0822 10/30/23  0618 10/29/23  2342 10/29/23  0651 10/29/23  0528 10/28/23  1817   WBC  --   --   --  7.8  --   --  10.1 8.7   HGB  --   --   --  9.9*  --   --  11.2* 12.2*   MCV  --   --   --  86  --   --  87 82   PLT  --   --   --  197  --   --  223 243   NA  --   --   --  136  --   --  138 136   POTASSIUM 3.7  --   --  3.3* 3.6   < > 2.7* 2.2*   CHLORIDE  --   --   --  112*  --   --  111* 106   CO2  --   --   --  13*  --   --  15* 17*   BUN  --   --   --  6.6*  --   --  7.6* 8.3   CR  --   --   --  1.00  --   --  1.10 1.26*   ANIONGAP  --   --   --  11  --   --  12 13   LATRELL  --   --   --  9.2  --   --  9.5 9.8   GLC  --  93 101* 89  --    < > 81 101*   ALBUMIN  --   --   --  2.4*  --   --  2.7* 3.3*   PROTTOTAL  --   --   --  5.0*  --   --  5.5* 6.2*   BILITOTAL  --   --   --  0.4  --   --  0.6 0.5   ALKPHOS  --   --   --  286*  --   --   318* 353*   ALT  --   --   --  13  --   --  13 16   AST  --   --   --  21  --   --  17 22   LIPASE  --   --   --   --   --   --   --  132*    < > = values in this interval not displayed.

## 2023-10-30 NOTE — CONSULTS
CLINICAL NUTRITION SERVICES - ASSESSMENT NOTE     Nutrition Prescription    RECOMMENDATIONS FOR MDs/PROVIDERS TO ORDER:  None at this time    Malnutrition Status:    Unable to determine due to lack of NFPE, nutrition hx PTA - will meet with pt as able    Recommendations already ordered by Registered Dietitian (RD):  Will offer Ensure enlive (any flavor) once daily with lunch to support PO intake - plan to adjust based on pt preference/tolerance, will increase pending potential refeeding syndrome    Please continue to encourage intake of food and fluids as able    Future/Additional Recommendations:  Will follow to monitor oral intake, weight changes, GI symptoms/BMs, dysphagia, and nutritional supplement tolerance     REASON FOR ASSESSMENT  Quan Murphy is a/an 73 year old male assessed by the dietitian via provider order - difficulty in swallowing solids, weight loss, throat cancer history thought in remission, recent bactermia illness from choledocholithiasis and identified at risk via screening criteria    MST score of 3: recent weight loss of 14-23 lbs, poor appetite noted    NUTRITION HISTORY  Pt admitted with decreased appetite and concern for dehydration. Was recently hospitalized 10/15 to 10/19 for gallstone pancreatitis. Is s/p ERCP with sphincterotomy and stent placement on 1016, laparoscopic cholecystectomy on 10/18. Pt has been able to tolerate fluids well but has no appetite. Denies N/V/abdominal pain. Is passing some loose stools since surgery but no diarrhea. Passing gas. Has difficulty swallowing large pills, causing pt to gag. Attributes this to tonsil cancer.  Only had Boost the day PTA.     Found to have inadequate oral intake, hypokalemia    Dxhx of hyperlipidemia, HTN, coronary atherosclerosis, s/p insertion of drug-eluting stent for CAD, squamous cell carcinoma of left tonsil, CKD stage 3, hypothyroidism due to acquired atrophy of thyroid, metastatic cancer to liver, malignant neoplasm  "metastatic to bone, bacteremia enterococcus, c/p ERCP, sphincterotomy with stent placement, lap cholecystectomy, malignant neoplasm of prostate, chronic maxillary sinusitis, contracture of palmar fascia, benign neoplasm of skin, sleep disorder, allergic conjunctivitis, anxiety, inguinal hernia, arthritis of foot, hallux rigidus, arthropathy, chest pain, s/p ORIF, closed Colles' fracture of right radius, syncope, renal hematoma, retroperitoneal hematoma, hiatal hernia, failure to thrive, uncomplicated opioid dependence, pancytopenia    CURRENT NUTRITION ORDERS  Diet: Orders Placed This Encounter      Soft & Bite Sized Diet (level 6) Thin Liquids (level 0)    Intake/Tolerance: 0-25% of meal trays so far during admission. Appetite rated as \"poor.\" Does seem to have adequate PO fluid intake with meals. GI is WDL, no concerns. Last BM was 10/29 per flow sheet.     LABS  Labs reviewed - potassium low this AM at 3.3, elevated chloride, low CO2, low urea nitrogen, low phos this AM at 1.7, elevated alkaline phosphatase, elevated lipase, slightly elevated BGs, low hemoglobin and hematocrit    MEDICATIONS  Medications reviewed - artificial saliva, citalopram, cymbalta, lovenox injection, synthroid, neutra-phos, potassium chloride, lyrica, crestor, dextrose IVF at 100 mL/hr = 2,400 mL per day, PRN melatonin last given yesterday 10/29, PRN oxycodone last given today 10/30, PRN ambien last given yesterday 10/29    ANTHROPOMETRICS  Height: 172.7 cm (5' 8\")  Most Recent Weight: 76.2 kg (168 lb 1.6 oz) via standing scale  IBW: 154 lbs  BMI: Overweight BMI 25-29.9  Weight History:   Wt Readings from Last 15 Encounters:   10/28/23 76.2 kg (168 lb 1.6 oz)   10/19/23 85.9 kg (189 lb 6.4 oz)   10/14/23 81.2 kg (179 lb)   10/10/23 83 kg (183 lb)   10/07/23 82.1 kg (181 lb)   08/02/23 91.6 kg (202 lb)   07/19/23 90.9 kg (200 lb 8 oz)   04/25/23 97 kg (213 lb 14.4 oz)   04/19/23 94.8 kg (209 lb)   03/09/23 96.1 kg (211 lb 14.4 oz) "   01/17/23 92 kg (202 lb 14.4 oz)   11/18/22 88.5 kg (195 lb 1.6 oz)   11/01/22 92.3 kg (203 lb 6.4 oz)   10/23/22 89.7 kg (197 lb 11.2 oz)   09/15/22 88.5 kg (195 lb)   16.8% weight loss in less than 3 months (significant for severe malnutrition)    Dosing Weight: 76.2 kg using actual BW    ASSESSED NUTRITION NEEDS  Estimated Energy Needs: 1,905-2,286 kcals/day (25 - 30 kcals/kg)  Justification: Increased needs and Repletion  Estimated Protein Needs:  grams protein/day (0.6 - 1.5 grams of pro/kg)  Justification: CKD, Hypercatabolism with critical illness, Increased needs, and Repletion  *pending kidney status  Estimated Fluid Needs: 2,286 mL/day (30 mL/kg)   Justification: Maintenance    PHYSICAL FINDINGS  See malnutrition section below.    MALNUTRITION  % Intake: Unable to assess PTA - will meet with pt as able *likely will meet criteria  % Weight Loss: > 7.5% in less than 3 months (severe)  Subcutaneous Fat Loss: Unable to assess  Muscle Loss: Unable to assess  Fluid Accumulation/Edema: None noted  Malnutrition Diagnosis: Unable to determine due to lack of NFPE, nutrition hx PTA - will meet with pt as able    NUTRITION DIAGNOSIS  Inadequate oral intake related to acute on chronic illness, poor appetite, dysphagia, hx of throat cancer as evidenced by need for modified diet texture, intake of 0-25% so far during admission, weight loss of > 7.5% in less than 3 months (severe), and increased needs above baseline    INTERVENTIONS  Implementation  Nutrition Education: Will be provided if education needs arise *would highly benefit from high debby, high pro nutrition therapy diet ed - added handouts to discharge instructions and will meet with pt as able     Collaboration with other providers  Medical food supplement therapy - Ensure enlive (any flavor) once daily with lunch to start, will increase as able pending pt preference/tolerance and pending potential refeeding syndrome   Multivitamin/mineral supplement  therapy - did not order as contraindicated     Goals  Patient to consume % of nutritionally adequate meal trays TID, or the equivalent with supplements/snacks  Pt weight will remain stable during admission  Pt will tolerate nutritional supplement    Monitoring/Evaluation  Progress toward goals will be monitored and evaluated per protocol.  Thuy Govea RD, LD  Clinical Dietitian  Office: [phone]  Weekend pager: [phone]

## 2023-10-31 PROBLEM — R74.8 ELEVATED ALKALINE PHOSPHATASE LEVEL: Status: ACTIVE | Noted: 2023-01-01

## 2023-10-31 PROBLEM — R13.12 OROPHARYNGEAL DYSPHAGIA: Status: ACTIVE | Noted: 2023-01-01

## 2023-10-31 PROBLEM — K90.89 BILE SALT-INDUCED DIARRHEA: Status: ACTIVE | Noted: 2023-01-01

## 2023-10-31 NOTE — PLAN OF CARE
"Goal Outcome Evaluation:           Overall Patient Progress: no changeOverall Patient Progress: no change    Outcome Evaluation: Patient refused breakfast and consumed more coca cola and encouraged to eat the apple sauce. Able to take oral scheduled  medications. Patient vomited large amount of yellow liquid this morning and had a diarrhea x 2 episodes. PRN Zofran given and MD updated. New order for cholestyramine. PRN Oxycodone x 2 given this shift due to throat pain. Potassium oral tablet was changed to packets for easy swallowing. Speech therapist evaluation done and patient on a pureed diet.    BP (!) 148/91 (BP Location: Right arm)   Pulse 92   Temp 97.8  F (36.6  C) (Oral)   Resp 18   Ht 1.727 m (5' 8\")   Wt 76.2 kg (168 lb 1.6 oz)   SpO2 96%   BMI 25.56 kg/m      "

## 2023-10-31 NOTE — PLAN OF CARE
"Goal Outcome Evaluation:           Overall Patient Progress: improvingOverall Patient Progress: improving    Outcome Evaluation: Ambulates in the hallway x 2 this shift. Reported throat pain 7/10, PRN Oxycodone x 2 given and was effective. PRN Pepcid utilized due to heartburn. Cardiac monitoring discontinued, NSR. Blood glucose at 90s. MD notified this morning for 1.7 Phosphorus, new order for P protocol. Potassium was replaced and is now 3.7.    /75 (BP Location: Left arm)   Pulse 94   Temp 98.9  F (37.2  C) (Oral)   Resp 20   Ht 1.727 m (5' 8\")   Wt 76.2 kg (168 lb 1.6 oz)   SpO2 98%   BMI 25.56 kg/m      "

## 2023-10-31 NOTE — PLAN OF CARE
Goal Outcome Evaluation:           Overall Patient Progress: improvingOverall Patient Progress: improving    Outcome Evaluation: Pt up SBA and A&O x4 with some intermintent confusion. Reported throat pain and recieved PRN oxycodone 10mg x1 overnight and also requested his melatonin and ambian at HS which are currently PRN but Pt reports to taking them on a regular basis. Pt is Saline locked. Pt refused potassium pill at HS but reports tolerating the powdered/ liquid version better due to throat pain. Wife Alessandra updated this AM and was anticipating Pt will have speech eval and electrolyte resolve to discharge anticipated for 11/01. All protocols electrolyes being rechecked this AM.

## 2023-10-31 NOTE — PROGRESS NOTES
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      Inadequate oral nutritional intake  Dysphagia  Hypoalbuminemia  History of inadequate PO intake requiring G-tube feeding in 2021 following chemoradiation treatment of squamous cell carcinoma of the left tonsil  He had no recent swallowing problems prior to his liver ablation in September 2023 but started noticing some difficulty in swallowing solids and larger pills prior to his recent hospitalizations earlier this month.  Since hospital discharge on 10/19/2023, he had progressively increasing swallowing difficulty and has either not been able to swallow solid foods or large pills or has had to expectorate them shortly after trying to swallow because they get stuck in his upper throat.  As a consequence, his oral nutritional intake has been inadequate.  No gross causative abnormality noted on exam or radiographically after CT scan of the neck and chest.  Serum albumin was low at admission and continues to be low likely due to inadequate nutritional intake.  Oral intake is significantly improved on 10/30.  Speech-language pathology evaluation on 10/31 was significantly abnormal concerning for dysphagia.  -Switched  to puréed solids with thin liquids per recommendation of speech-language pathologist  -Ordered video swallow study per recommendation of speech-language pathologist  -Nursing may crush pills and or dissolve in liquid if needed  -Registered dietitian assisting with recommendations for optimizing nutritional status  -Monitor serum albumin as indicated    Metabolic acidosis with normal anion gap  Diarrhea  Patient was admitted with metabolic acidosis and normal anion gap that also appears to have been present during his previous hospitalization.  He has intermittent compensatory respiratory alkalosis.  He has diarrhea which he says began after his cholecystectomy raising suspicion for postcholecystectomy diarrheal syndrome.  Suspect metabolic acidosis is due to stool bicarbonate losses from diarrhea.  Metabolic acidosis may exacerbate other GI issues.  -Start oral bicarbonate supplementation  -Ordered recheck of electrolytes and blood gases  -Start cholestyramine twice daily and follow stool output    Hypokalemia  Hypophosphatemia  He had hypokalemia during hospitalization on 10/7/2023 that was attributed to poor oral intake, nausea, and diarrhea.  Patient discharged from the last 2 hospitalizations in October with potassium supplementation at home which the patient has not tolerated due to inability to swallow the large pill.  Admitted with potassium 2.2 and potassium has now normalized after additional potassium supplementation.  Noted to have hypophosphatemia likely due to inadequate oral nutritional intake and which could contribute to weakness.  -Monitor potassium as indicated and continue to provide potassium supplementation per protocol  -Resume scheduled potassium supplementation  -Continue phosphorus supplementation per standard protocol    Anemia, unspecified type  Mildly anemic with hemoglobin 9-10, similar to hemoglobin at time of recent hospital discharge on October 19.  Active bleeding is not suspected  and stool testing for occult blood negative during this hospital stay.  -Monitor hemoglobin periodically    Squamous cell carcinoma of left tonsil   Metastatic cancer to liver s/p ablation of liver metastasis on 9/19/23  Abnormal abdomen CT with area of extrahepatic hypoattenuation  He is status post chemoradiation of left tonsillar  mass in 2021.  At that time, he had malnutrition from inability to swallow safely and required G-tube feeding for period of time along with speech therapy evaluation and strengthening.  Initial mass responded well to treatment however since that time patient has had intermittent metastatic disease show up in his neck, in the rib, and most recently in the liver.  All have responded well to medical management.  He underwent ablation of liver metastasis at [facility] on 9/19/2023.  On abdomen CT during this hospital stay, he was found to have signs of previous ablation and liver mets but finding of 2 cm region of extrahepatic hypoattenuation raising concern for possibility of phlegmon or abscess which is new as compared with October 14.  It is unclear whether this radiographic abnormality is related to previous ablation of hepatic metastasis or more recent laparoscopic cholecystectomy.  Clinically, active infection is not suspected nor is he having worsening abdominal pain.  -Continue clinical monitoring  -If he were to develop fever or other signs of SIRS or worsening abdominal symptoms, would consider additional diagnostic evaluation of this radiographic abnormality  -Anticipate outpatient follow-up with oncology team at [facility] in November/December as previously planned    Elevated alkaline phosphatase  Serum alkaline phosphatase has been consistently elevated during this hospitalization and is higher than it was at hospital discharge on October 19 after his cholecystectomy and ERCP with sphincterotomy and biliary stent placement.  Other LFTs have been stable.  Aside from an area  of focal biliary dilation on CT distal to one of the hepatic metastatic lesions, other biliary obstruction has not been suspected during this hospitalization.  Biliary infection has not been suspected so far.  -Monitor LFTs including alkaline phosphatase periodically    Abnormal chest CT  Chest CT this hospitalization demonstrates opacity in the right lower lobe consistent with possible pneumonitis.  Clinically, pneumonia is not suspected at this time.  Recent aspiration is possible.  He is not having significant respiratory symptoms at this time.  -Continue clinical monitoring  -If he develops fever or worsening respiratory symptoms, consider additional evaluation and/or treatment for pneumonia including aspiration pneumonia    Enterococcus bacteremia 9/27/23   Acute cholecystitis and gallstone pancreatitis related to choledocholithiasis S/P ERCP, sphincterotomy with stent placement, and lap cholecystectomy 10/15/23-10/18/23  Patient hospitalized from 10/7/2023 through 10/10/2023 and again 10/15/2023 through 10/19/2023 due to symptomatic choledocholithiasis with gallstone pancreatitis and acute cholecystitis for which she had invasive intervention including sphincterotomy with common bile duct stent placement and laparoscopic cholecystectomy.  He had completed a prolonged course of antibiotic treatment with recent blood cultures negative.  He has not had fever or other signs of infection during this hospitalization.  Blood cultures obtained 10/29 are pending and he is not currently receiving antibiotics.  -Continue clinical monitoring, reconsider empiric antibiotic therapy if there is increasing concern for infection    Coronary atherosclerosis Status post insertion of drug-eluting stent into left anterior descending artery for coronary artery disease  Previous history of CAD and coronary artery stent without any recent symptoms or signs signs concerning for angina.  Patient chronically treated with on metoprolol  "XL 25 mg daily and Crestor 40 mg daily but not aspirin.    -Holding metoprolol for now due to low normal blood pressures and poor oral intake but anticipate restarting it as blood pressure trends upward  -Continue chronic dose of Crestor    Hypertension goal BP (blood pressure) < 140/90  Chronic hypertension is normally treated with metoprolol XL 25 mg daily which has been held since admission due to low normal blood pressures and poor oral intake.  He has been normotensive.  Heart rate has trended upward.  -Continue to hold metoprolol today    Chronic kidney disease, stage 3  Baseline creatinine of 1.1-1.4 concerning for stage III chronic kidney disease.  Renal function is stable compared with this baseline.  -ordered recheck of renal function    Hypothyroidism due to acquired atrophy of thyroid  Chronic hypothyroidism appears stable on chronic dose of levothyroxine 100 mcg daily  -Continue chronic regimen     Anxiety  Chronic anxiety has been stable on chronic dose of Celexa   -Continue chronic dose of Celexa    Hyperlipidemia LDL goal <130  Chronic hyperlipidemia normally treated with Crestor 40 mg daily  -Continue chronic dose of Crestor          Diet: Snacks/Supplements Adult: Ensure Enlive; With Meals  Pureed Diet (level 4) Thin Liquids (level 0)    DVT Prophylaxis: Enoxaparin (Lovenox) SQ  Stone Catheter: Not present  Lines: None     Cardiac Monitoring: None  Code Status: Full Code      Clinically Significant Risk Factors        # Hypokalemia: Lowest K = 2.9 mmol/L in last 2 days, will replace as needed    # Hypercalcemia: corrected calcium is >10.1, will monitor as appropriate    # Hypoalbuminemia: Lowest albumin = 2.4 g/dL at 10/30/2023  6:18 AM, will monitor as appropriate     # Hypertension: Noted on problem list        # Overweight: Estimated body mass index is 25.56 kg/m  as calculated from the following:    Height as of this encounter: 1.727 m (5' 8\").    Weight as of this encounter: 76.2 kg (168 lb " 1.6 oz)., PRESENT ON ADMISSION     # Financial/Environmental Concerns: none         Disposition Plan     Expected Discharge Date: 11/02/2023      Destination: home with family              Pedro Guillaume MD  Hospitalist Service  [facility]  Securely message with Lulu (more info)  Text page via [facility] Paging/Directory   ______________________________________________________________________    Interval History   There were no significant overnight events.  He tolerated oral intake well yesterday.  After breakfast today, he noted worsening nausea and subsequently vomited.  After vomiting, nausea completely resolved and has not recurred.  He has not had any worsening abdominal pain.  He did have a large diarrheal watery bowel movement today.  Cough is improved.  He remains afebrile.  He has been intermittently tachycardic but remains normotensive.  Oxygenation has been normal.  Urine output has been good.    Additional history is obtained from the patient.  He says that after his cholecystectomy 1 to 2 weeks ago, he has been having ongoing diarrheal stools.  He had not been having diarrhea prior to his cholecystectomy.    Physical Exam   Vital Signs: Temp: 99.4  F (37.4  C) Temp src: Oral BP: 114/71 Pulse: 91   Resp: 18 SpO2: 96 % O2 Device: None (Room air)    Patient Vitals for the past 24 hrs:   BP Temp Temp src Pulse Resp SpO2   10/31/23 0948 114/71 99.4  F (37.4  C) Oral 91 18 96 %   10/31/23 0750 130/75 97.7  F (36.5  C) Axillary 97 18 97 %   10/31/23 0128 136/76 99  F (37.2  C) Oral 112 16 --   10/30/23 1946 129/85 98.9  F (37.2  C) Oral 100 18 98 %   10/30/23 1604 126/75 98.9  F (37.2  C) Oral 94 20 98 %       Intake/Output Summary (Last 24 hours) at 10/31/2023 1330  Last data filed at 10/31/2023 0900  Gross per 24 hour   Intake 360 ml   Output 750 ml   Net -390 ml       General Appearance: Elderly man without signs of acute distress sitting up in bed  Respiratory: Normal  respiratory effort, few inspiratory crackles at the bases bilaterally that clear with deep inspiration  Cardiovascular: Regular rate and rhythm, good radial pulse, normal capillary refill  GI: Hypoactive bowel sounds, nondistended abdomen, soft, no abdominal tenderness     Medical Decision Making       42 MINUTES SPENT BY ME on the date of service doing chart review, history, exam, documentation & further activities per the note.      Data     I have personally reviewed the following data over the past 24 hrs:    N/A  \   10.7 (L)   / N/A     136 112 (H) 7.1 (L) /  103 (H)   3.5 11 (L) 1.00 \     ALT: 18 AST: 46 (H) AP: 353 (H) TBILI: 0.5   ALB: 2.6 (L) TOT PROTEIN: 5.7 (L) LIPASE: N/A       Phosphorus 3.7  Blood cultures are negative so far  Stool testing for occult blood was negative    Venous Blood Gas  Recent Labs   Lab 10/31/23  1012 10/31/23  0617   PHV 7.35 7.26*   PCO2V 27* 37*   PO2V 49* 25   HCO3V 15* 16*   RASHAWN -9.8* -10.1*   O2PER 21 21       Recent Labs   Lab 10/31/23  1012 10/31/23  0929 10/31/23  0617 10/31/23  0131 10/30/23  1702 10/30/23  1254 10/30/23  0822 10/30/23  0618 10/29/23  0651 10/29/23  0528 10/28/23  1817   WBC  --   --   --   --   --   --   --  7.8  --  10.1 8.7   HGB  --   --  10.7*  --   --   --   --  9.9*  --  11.2* 12.2*   MCV  --   --   --   --   --   --   --  86  --  87 82   PLT  --   --   --   --   --   --   --  197  --  223 243     --  138  --   --   --   --  136  --  138 136   POTASSIUM 3.5  --  4.3  --   --  3.7  --  3.3*   < > 2.7* 2.2*   CHLORIDE 112*  --  112*  --   --   --   --  112*  --  111* 106   CO2 11*  --  14*  --   --   --   --  13*  --  15* 17*   BUN  --   --  7.1*  --   --   --   --  6.6*  --  7.6* 8.3   CR  --   --  1.00  --   --   --   --  1.00  --  1.10 1.26*   ANIONGAP 13  --  12  --   --   --   --  11  --  12 13   LATRELL  --   --  9.2  --   --   --   --  9.2  --  9.5 9.8   GLC  --  103* 87 97   < >  --    < > 89   < > 81 101*   ALBUMIN  --   --  2.6*  --    --   --   --  2.4*  --  2.7* 3.3*   PROTTOTAL  --   --  5.7*  --   --   --   --  5.0*  --  5.5* 6.2*   BILITOTAL  --   --  0.5  --   --   --   --  0.4  --  0.6 0.5   ALKPHOS  --   --  353*  --   --   --   --  286*  --  318* 353*   ALT  --   --  18  --   --   --   --  13  --  13 16   AST  --   --  46*  --   --   --   --  21  --  17 22   LIPASE  --   --   --   --   --   --   --   --   --   --  132*    < > = values in this interval not displayed.

## 2023-10-31 NOTE — PROGRESS NOTES
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  Orders received for clinical bedside swallow evaluation.   General Observations Patient sitting upright in bed with wife at bedside throughout today s evaluation. Patient reporting that he has had swallowing difficulties for years, but that it has gotten significantly worse over the past few months.       Present no   Language English   Type of Evaluation   Type of Evaluation Swallow Evaluation   Oral Motor   Oral Musculature generally intact   Structural Abnormalities none present   Mucosal Quality dry   Dentition (Oral Motor)   Dentition (Oral Motor) adequate dentition;natural dentition   Facial Symmetry (Oral Motor)   Facial Symmetry (Oral Motor) WNL   Lip Function (Oral Motor)   Lip Range of Motion (Oral Motor) WNL   Tongue Function (Oral Motor)   Tongue ROM (Oral Motor) WNL   Jaw Function (Oral Motor)   Jaw Function (Oral Motor) WNL   Vocal  Quality/Secretion Management (Oral Motor)   Vocal Quality (Oral Motor) WNL   Secretion Management (Oral Motor) WNL   General Swallowing Observations   Past History of Dysphagia Yes-known history of dysphagia, however, it has gotten significantly worse over that past few months. Patient reporting that he is no longer able to swallow pills or eat foods that need to be chewed.   Respiratory Support room air   Current Diet/Method of Nutritional Intake (General Swallowing Observations, NIS) easy to chew (level 7);thin liquids (level 0)   Swallowing Evaluation Clinical swallow evaluation   Clinical Swallow Evaluation   Feeding Assistance no assistance needed   Rationale for completing additional evaluation Due to medical history and recent changes in swallow function, recommend MBSS to objectively assess oropharyngeal swallow to determine appropriate diet and/or exercises.   Clinical Swallow Evaluation Textures Trialed thin liquids;pureed;soft & bite-sized   Clinical Swallow Eval: Thin Liquid Texture Trial   Mode of Presentation, Thin Liquids cup;self-fed   Volume of Liquid or Food Presented 3 ounces   Oral Phase of Swallow delayed AP movement   Pharyngeal Phase of Swallow intact   Diagnostic Statement Delayed initiation of swallow, however, no overt s/sx of penetration/aspiration.   Clinical Swallow Evaluation: Puree Solid Texture Trial   Mode of Presentation, Puree spoon;self-fed   Volume of Puree Presented 1 ounce   Oral Phase, Puree delayed AP movement   Pharyngeal Phase, Puree intact   Diagnostic Statement Difficulties initiating swallow. No overt s/sx of penetration/aspiration.   Clinical Swallow Eval: Soft & Bite Sized   Mode of Presentation spoon;self-fed   Volume Presented 1 bite of soft fish   Oral Phase impaired mastication;delayed AP movement   Pharyngeal Phase other (see comments)  (spit out)   Diagnostic Statement Patient presenting with significantly prolonged mastication. Pt using thin liquid to wash down  small amounts which resulted in delayed initiation of the swallow. Pt eventually expectorating bolus.   Esophageal Phase of Swallow   Patient reports or presents with symptoms of esophageal dysphagia No   Swallowing Recommendations   Diet Consistency Recommendations thin liquids (level 0);pureed (level 4)   Mode of Delivery Recommendations bolus size, small   Swallowing Maneuver Recommendations alternate food and liquid intake   Recommended Feeding/Eating Techniques (Swallow Eval) patient is independent, no specific recommendations   Medication Administration Recommendations, Swallowing (SLP) pills in liquid form (if able)   Instrumental Assessment Recommendations VFSS (videofluoroscopic swallowing study)   Comment, Swallowing Recommendations Patient presents with moderate-severe oropharyngeal dysphagia characterized by prolonged mastication and delayed initiation of swallow. Patient demonstrating mastication of soft solid for >2 minutes prior to expectoration of bolus. Pt demonstrating bolus holding prior to initiation of swallow. Recommend puree diet with thin liquids. Medications in liquid form. Recommend modified barium swallow study to objectively assess oral and pharyngeal swallow function and to determine appropriate diet and/or exercises. Consider alternate route of nutrition d/t limited oral intake.   General Therapy Interventions   Planned Therapy Interventions Dysphagia Treatment   Dysphagia treatment Modified diet education;Compensatory strategies for swallowing   Intervention Comments To maximize safety with oral intake and reduce risk of aspiration and pneumonia   Clinical Impression   Criteria for Skilled Therapeutic Interventions Met (SLP Eval) Yes, treatment indicated   SLP Diagnosis moderate-severe oropharyngeal dysphagia   Activity Limitations Related to Problem List (SLP) reduced PO intake   Risks & Benefits of therapy have been explained evaluation/treatment results reviewed;care plan/treatment  goals reviewed;current/potential barriers reviewed;participants voiced agreement with care plan;participants included;patient;spouse/significant other   Clinical Impression Comments Patient presents with severe oropharyngeal dysphagia characterized by prolonged mastication and delayed initiation of swallow. Patient demonstrating mastication of soft solid for >2 minutes prior to expectoration of bolus. Pt demonstrating bolus holding prior to initiation of swallow. Recommend puree diet with thin liquids. Medications in liquid form. Recommend modified barium swallow study to objectively assess oral and pharyngeal swallow function and to determine appropriate diet and/or exercises. Consider alternate route of nutrition d/t limited oral intake.   SLP Total Evaluation Time   Eval: oral/pharyngeal swallow function, clinical swallow Minutes (47884) 45   SLP Goals   Therapy Frequency (SLP Eval) 2 times/week   SLP Predicted Duration/Target Date for Goal Attainment 11/06/23   SLP Goals Swallow   SLP: Safely tolerate diet without signs/symptoms of aspiration Minced & moist diet;Thin liquids;With use of compensatory swallow strategies;Independently   SLP Discharge Planning   SLP Plan SLP will continue to follow during length of stay   SLP Discharge Recommendation home with outpatient therapy services   SLP Rationale for DC Rec Recommend VFSS to objectively assess oral and pharyngeal phase of swallow and/or determine exercises and compensatory strategies.   SLP Brief overview of current status  puree diet, thin liquids, medications in liquid form   Total Session Time   Total Session Time (sum of timed and untimed services) 45       Thank you for this referral!    Chela Weinstein MA, CCC-SLP  [facility]  [phone]

## 2023-11-01 NOTE — PLAN OF CARE
"Goal Outcome Evaluation:      Plan of Care Reviewed With: patient    Overall Patient Progress: improvingOverall Patient Progress: improving    Outcome Evaluation: Pt tolerated pureed diet and thin liquids overnight.  1 dose 10mg oxycodone given for 7/10 throat pain.  Plan is do video swallow outpatient after discharge.  Please see note in MAR regarding meds in water.      /85 (BP Location: Left arm)   Pulse 100   Temp 98.3  F (36.8  C) (Oral)   Resp 18   Ht 1.727 m (5' 8\")   Wt 76.2 kg (168 lb 1.6 oz)   SpO2 96%   BMI 25.56 kg/m     "

## 2023-11-01 NOTE — PLAN OF CARE
Goal Outcome Evaluation:      Plan of Care Reviewed With: patient    Overall Patient Progress: no changeOverall Patient Progress: no change         Patient alert and oriented. Vital signs stable. Patient independent in room. Eating small portions of meals. PRN oxycodone for throat pain. Meds dissolved in water.

## 2023-11-01 NOTE — PROGRESS NOTES
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      Inadequate oral nutritional intake  Dysphagia  Hypoalbuminemia  History of inadequate PO intake requiring G-tube feeding in 2021 following chemoradiation treatment of squamous cell carcinoma of the left tonsil  He had no recent swallowing problems prior to his liver ablation in September 2023 but started noticing some difficulty in swallowing solids and larger pills prior to his recent hospitalizations earlier this month.  Since hospital discharge on 10/19/2023, he had progressively increasing swallowing difficulty and has either not been able to swallow solid foods or large pills or has had to expectorate them shortly after trying to swallow because they get stuck in his upper throat.  As a consequence, his oral nutritional intake has been inadequate.  No gross causative abnormality noted on exam or radiographically after CT scan of the neck and chest.  Serum albumin was low at admission and continues to be low likely due to inadequate nutritional intake.  Oral intake started to significantly improve as of 10/30.  Speech-language pathology evaluation on 10/31 was significantly abnormal concerning for dysphagia  for which additional diagnostic evaluation has been advised.  However, he appears to be tolerating modified diet well since then.  -Switched to puréed solids with thin liquids per recommendation of speech-language pathologist  -Anticipate video swallow study on 11/3 as scheduled  -Nursing may crush pills and or dissolve in liquid if needed  -Registered dietitian assisting with recommendations for optimizing nutritional status  -Monitor serum albumin as indicated    Metabolic acidosis with normal anion gap  Diarrhea  Patient was admitted with metabolic acidosis and normal anion gap that also appears to have been present during his previous hospitalization.  He has intermittent compensatory respiratory alkalosis.  He has diarrhea which he says began after his cholecystectomy raising suspicion for postcholecystectomy diarrheal syndrome.  Suspect metabolic acidosis is due to stool bicarbonate losses from diarrhea.  Metabolic acidosis may exacerbate other GI issues.  Both diarrhea and metabolic acidosis persist.  Today they provide history that their dog has been diagnosed with Giardia intestinal infection.  So far, other enteric infection including clostridia difficile has not been suspected.  Hyperthyroidism causing diarrhea is another consideration.  Hyperchloremia after previous IV fluid treatment with chloride and ongoing potassium chloride supplementation may exacerbate metabolic acidosis.  -Increase oral bicarbonate supplementation to 4 times daily  -Ordered recheck of electrolytes and blood gases  -Increase cholestyramine to 3 times daily  -Start scheduled dose of Lomotil twice daily  -Ordered testing for stool Giardia antigen and if Giardia infection is identified would treat appropriately  -Ordered TSH and free T4    Hypokalemia  Hypophosphatemia  He had hypokalemia during hospitalization on 10/7/2023 that was attributed to poor oral intake, nausea, and diarrhea.  Patient discharged from the last 2  hospitalizations in October with potassium supplementation at home which the patient has not tolerated due to inability to swallow the large pill.  Admitted with potassium 2.2 and potassium has now normalized after additional potassium supplementation.  Noted to have hypophosphatemia likely due to inadequate oral nutritional intake and which could contribute to weakness.  -Monitor potassium as indicated and continue to provide potassium supplementation per protocol  -Continue scheduled potassium supplementation  -Continue phosphorus supplementation per standard protocol    Anemia, unspecified type  Mildly anemic with hemoglobin 9-10, similar to hemoglobin at time of recent hospital discharge on October 19.  Active bleeding is not suspected and stool testing for occult blood negative during this hospital stay.  -Monitor hemoglobin periodically    Squamous cell carcinoma of left tonsil   Metastatic cancer to liver s/p ablation of liver metastasis on 9/19/23  Abnormal abdomen CT with area of extrahepatic hypoattenuation  He is status post chemoradiation of left tonsillar  mass in 2021.  At that time, he had malnutrition from inability to swallow safely and required G-tube feeding for period of time along with speech therapy evaluation and strengthening.  Initial mass responded well to treatment however since that time patient has had intermittent metastatic disease show up in his neck, in the rib, and most recently in the liver.  All have responded well to medical management.  He underwent ablation of liver metastasis at [facility] on 9/19/2023.  On abdomen CT during this hospital stay, he was found to have signs of previous ablation and liver mets but finding of 2 cm region of extrahepatic hypoattenuation raising concern for possibility of phlegmon or abscess which is new as compared with October 14.  It is unclear whether this radiographic abnormality is related to previous ablation of hepatic metastasis or more  recent laparoscopic cholecystectomy.  Clinically, active infection is not suspected nor is he having worsening abdominal pain.  -Continue clinical monitoring  -If he were to develop fever or other signs of SIRS or worsening abdominal symptoms, would consider additional diagnostic evaluation of this radiographic abnormality  -Anticipate outpatient follow-up with oncology team at [facility] in November/December as previously planned    Elevated alkaline phosphatase  Serum alkaline phosphatase has been consistently elevated during this hospitalization and is higher than it was at hospital discharge on October 19 after his cholecystectomy and ERCP with sphincterotomy and biliary stent placement.  Other LFTs have been stable.  Aside from an area of focal biliary dilation on CT distal to one of the hepatic metastatic lesions, other biliary obstruction has not been suspected during this hospitalization.  Biliary infection has not been suspected so far.  -Monitor LFTs including alkaline phosphatase periodically    Abnormal chest CT  Chest CT this hospitalization demonstrates opacity in the right lower lobe consistent with possible pneumonitis.  Clinically, pneumonia is not suspected at this time.  Recent aspiration is possible.  He is not having significant respiratory symptoms at this time.  -Continue clinical monitoring  -If he develops fever or worsening respiratory symptoms, consider additional evaluation and/or treatment for pneumonia including aspiration pneumonia    Enterococcus bacteremia 9/27/23   Acute cholecystitis and gallstone pancreatitis related to choledocholithiasis S/P ERCP, sphincterotomy with stent placement, and lap cholecystectomy 10/15/23-10/18/23  Patient hospitalized from 10/7/2023 through 10/10/2023 and again 10/15/2023 through 10/19/2023 due to symptomatic choledocholithiasis with gallstone pancreatitis and acute cholecystitis for which she had invasive intervention including sphincterotomy with  common bile duct stent placement and laparoscopic cholecystectomy.  He had completed a prolonged course of antibiotic treatment with recent blood cultures negative.  He has not had fever or other signs of infection during this hospitalization.  Blood cultures obtained 10/29 are pending and he is not currently receiving antibiotics.  -Continue clinical monitoring, reconsider empiric antibiotic therapy if there is increasing concern for infection    Coronary atherosclerosis Status post insertion of drug-eluting stent into left anterior descending artery for coronary artery disease  Previous history of CAD and coronary artery stent without any recent symptoms or signs signs concerning for angina.  Patient chronically treated with on metoprolol XL 25 mg daily and Crestor 40 mg daily but not aspirin.    -Holding metoprolol for now due to low normal blood pressures and poor oral intake but anticipate restarting it as blood pressure trends upward  -Continue chronic dose of Crestor    Hypertension goal BP (blood pressure) < 140/90  Chronic hypertension is normally treated with metoprolol XL 25 mg daily which has been held since admission due to low normal blood pressures and poor oral intake.  He has been normotensive.  Heart rate has trended upward.  -Continue to hold metoprolol today    Chronic kidney disease, stage 3  Baseline creatinine of 1.1-1.4 concerning for stage III chronic kidney disease.  Renal function is stable compared with this baseline.  -ordered recheck of renal function    Hypothyroidism due to acquired atrophy of thyroid  Chronic hypothyroidism appears stable on chronic dose of levothyroxine 100 mcg daily although it is possible that hyperthyroidism contributes to diarrhea.  Most recent TSH was low in August 2023 with borderline elevation of free T4 of 1.70 (1.70 is the upper limit of normal)  -Continue chronic dose of levothyroxine for now and reassess based on thyroid function test results ordered  "today    Anxiety  Chronic anxiety has been stable on chronic dose of Celexa   -Continue chronic dose of Celexa    Hyperlipidemia LDL goal <130  Chronic hyperlipidemia normally treated with Crestor 40 mg daily  -Continue chronic dose of Crestor          Diet: Snacks/Supplements Adult: Ensure Enlive; With Meals  Pureed Diet (level 4) Thin Liquids (level 0)    DVT Prophylaxis: Enoxaparin (Lovenox) SQ  Stone Catheter: Not present  Lines: None     Cardiac Monitoring: None  Code Status: Full Code      Clinically Significant Risk Factors        # Hypokalemia: Lowest K = 3.3 mmol/L in last 2 days, will replace as needed    # Hypercalcemia: corrected calcium is >10.1, will monitor as appropriate    # Hypoalbuminemia: Lowest albumin = 2.4 g/dL at 10/30/2023  6:18 AM, will monitor as appropriate     # Hypertension: Noted on problem list        # Overweight: Estimated body mass index is 25.56 kg/m  as calculated from the following:    Height as of this encounter: 1.727 m (5' 8\").    Weight as of this encounter: 76.2 kg (168 lb 1.6 oz)., PRESENT ON ADMISSION     # Financial/Environmental Concerns: none         Disposition Plan     Expected Discharge Date: 11/02/2023      Destination: home with family              Pedro Guillaume MD  Hospitalist Service  [facility]  Securely message with [employer] (more info)  Text page via [employer] Paging/Directory   ______________________________________________________________________    Interval History   There were no significant overnight events.  He remains afebrile and generally stable hemodynamically.  Heart rate and blood pressure have fluctuated from normal to mildly elevated.  Oxygenation remains stable.  He has had good urine output.  He had 2 bowel movements yesterday and reports that they continue to be loose and diarrheal without change in consistency so far.  He denies any abdominal pain.    Additional history is obtained from the patient and his wife " today.  They report that their pet dog has been diagnosed with Giardia intestinal infection.  Patient says that he has little if any contact with the dog and does not manage the dog's stools at home.    Physical Exam   Vital Signs: Temp: 98.5  F (36.9  C) Temp src: Oral BP: (!) 140/85 Pulse: 87   Resp: 20 SpO2: 96 % O2 Device: None (Room air)    Patient Vitals for the past 24 hrs:   BP Temp Temp src Pulse Resp SpO2   11/01/23 0750 (!) 140/85 98.5  F (36.9  C) Oral 87 20 96 %   11/01/23 0009 138/85 98.3  F (36.8  C) Oral 100 18 96 %   10/31/23 2011 -- 98.8  F (37.1  C) Oral -- -- --   10/31/23 2010 -- -- -- -- -- 97 %   10/31/23 2009 121/71 -- -- 92 -- 97 %   10/31/23 1513 (!) 148/91 97.8  F (36.6  C) Oral 92 18 96 %       Intake/Output Summary (Last 24 hours) at 11/1/2023 1131  Last data filed at 11/1/2023 1000  Gross per 24 hour   Intake 1030 ml   Output 1250 ml   Net -220 ml       General Appearance: Elderly man without signs of distress while sitting in bed  Respiratory: Normal respiratory effort, few inspiratory crackles at the left lung base with otherwise clear lung fields  Cardiovascular: Regular rate and rhythm, good radial pulse, normal capillary refill     Medical Decision Making             Data     I have personally reviewed the following data over the past 24 hrs:    N/A  \   N/A   / N/A     137 112 (H) 7.4 (L) /  85   3.3 (L) 13 (L) 1.05 \     ALT: 15 AST: 26 AP: 318 (H) TBILI: 0.4   ALB: 2.7 (L) TOT PROTEIN: 5.4 (L) LIPASE: N/A       Phosphorus 3.3    Venous Blood Gas  Recent Labs   Lab 11/01/23  0638 10/31/23  1419 10/31/23  1012 10/31/23  0617   PHV 7.32 7.27* 7.35 7.26*   PCO2V 29* 37* 27* 37*   PO2V 70* 28 49* 25   HCO3V 15* 17* 15* 16*   RASHAWN -9.8* -9.1* -9.8* -10.1*   O2PER 21 21 21 21       Recent Labs   Lab 11/01/23  0638 10/31/23  1419 10/31/23  1012 10/31/23  0929 10/31/23  0617 10/30/23  0822 10/30/23  0618 10/29/23  0651 10/29/23  0528 10/28/23  1817   WBC  --   --   --   --   --   --  7.8   --  10.1 8.7   HGB  --   --   --   --  10.7*  --  9.9*  --  11.2* 12.2*   MCV  --   --   --   --   --   --  86  --  87 82   PLT  --   --   --   --   --   --  197  --  223 243    136 136  --  138  --  136  --  138 136   POTASSIUM 3.3* 3.5 3.5  --  4.3   < > 3.3*   < > 2.7* 2.2*   CHLORIDE 112* 110* 112*  --  112*  --  112*  --  111* 106   CO2 13* 14* 11*  --  14*  --  13*  --  15* 17*   BUN 7.4*  --   --   --  7.1*  --  6.6*  --  7.6* 8.3   CR 1.05  --   --   --  1.00  --  1.00  --  1.10 1.26*   ANIONGAP 12 12 13  --  12  --  11  --  12 13   LATRELL 9.0  --   --   --  9.2  --  9.2  --  9.5 9.8   GLC 85  --   --  103* 87   < > 89   < > 81 101*   ALBUMIN 2.7*  --   --   --  2.6*  --  2.4*  --  2.7* 3.3*   PROTTOTAL 5.4*  --   --   --  5.7*  --  5.0*  --  5.5* 6.2*   BILITOTAL 0.4  --   --   --  0.5  --  0.4  --  0.6 0.5   ALKPHOS 318*  --   --   --  353*  --  286*  --  318* 353*   ALT 15  --   --   --  18  --  13  --  13 16   AST 26  --   --   --  46*  --  21  --  17 22   LIPASE  --   --   --   --   --   --   --   --   --  132*    < > = values in this interval not displayed.

## 2023-11-02 NOTE — PROGRESS NOTES
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      Inadequate oral nutritional intake  Nausea and vomiting  Dysphagia  Hypoalbuminemia  History of inadequate PO intake requiring G-tube feeding in 2021 following chemoradiation treatment of squamous cell carcinoma of the left tonsil  He had no recent swallowing problems prior to his liver ablation in September 2023 but started noticing some difficulty in swallowing solids and larger pills prior to his recent hospitalizations earlier this month.  Since hospital discharge on 10/19/2023, he had progressively increasing swallowing difficulty and has either not been able to swallow solid foods or large pills or has had to expectorate them shortly after trying to swallow because they get stuck in his upper throat.  As a consequence, his oral nutritional intake has been inadequate.  No gross causative abnormality noted on exam or radiographically after CT scan of the neck and chest.  Oral intake started to significantly improve as of 10/30.  Speech-language pathology evaluation on 10/31 was significantly abnormal concerning for dysphagia for which additional diagnostic evaluation has been advised.  He appears to be  swallowing modified diet well since then but continues to be troubled by recurring nausea and vomiting such that inadequate oral intake persists.  Serum albumin was low at admission and continues to be low likely due to inadequate nutritional intake.    -Continue puréed solids with thin liquids   -Anticipate video swallow study on 11/3 as scheduled  -Nursing may crush pills and or dissolve in liquid if needed  -Registered dietitian assisting with recommendations for optimizing nutritional status  -Monitor serum albumin as indicated  -Start scopolamine patch due to refractory nausea  -Continue Zofran and add Compazine as needed for nausea    Metabolic acidosis with normal anion gap  Diarrhea, suspect ileus  Patient was admitted with metabolic acidosis and normal anion gap that also appears to have been present during his previous hospitalization.  He has intermittent compensatory respiratory alkalosis.  He has diarrhea which he says began after his cholecystectomy raising suspicion for postcholecystectomy diarrheal syndrome.  Suspect metabolic acidosis is due to stool bicarbonate losses from diarrhea.  Metabolic acidosis may be causing ileus that in turn is causing recurring nausea and vomiting and persistent diarrhea.  Both diarrhea and metabolic acidosis persist although diarrhea is starting to improve after starting Questran and scheduled doses of Imodium.  During hospitalization they provided history that their dog has been diagnosed with Giardia intestinal infection.  So far, other enteric infection including clostridia difficile has not been suspected.  Hyperchloremia after previous IV fluid treatment with chloride and ongoing potassium chloride supplementation may exacerbate metabolic acidosis.  -Continue oral bicarbonate supplementation 4 times daily  -Ordered recheck of electrolytes and blood gases  -Hold cholestyramine today due to nausea and inability to tolerate oral medication including  medications  -Continue scheduled dose of Lomotil twice daily  -if Giardia infection is identified would treat appropriately  -Attempting to avoid chloride administration whether and IV fluids or with medications including potassium chloride    Hypokalemia  Hypophosphatemia  He had hypokalemia during hospitalization on 10/7/2023 that was attributed to poor oral intake, nausea, and diarrhea.  Patient discharged from the last 2 hospitalizations in October with potassium supplementation at home which the patient has not tolerated due to inability to swallow the large pill.  Admitted with potassium 2.2 and potassium has intermittently normalized after additional potassium supplementation, but hypokalemia continues to recur probably because of inadequate oral intake, ongoing stool losses, intermittent patient refusal of oral potassium supplementation, and metabolic acidosis causing electrolyte shifts.  There is concern that recurring hypokalemia exacerbates ileus.  Noted to have hypophosphatemia likely due to inadequate oral nutritional intake and which could contribute to weakness and/or ileus.  -Monitor potassium as indicated, discontinue standard potassium supplementation protocol because protocol includes treatment with potassium chloride and attempting to limit chloride administration due to persistent metabolic acidosis  -Continue scheduled potassium supplementation but switch to potassium phosphorus  -Continue phosphorus supplementation per standard protocol    Anemia, unspecified type  Mildly anemic with hemoglobin 9-10, similar to hemoglobin at time of recent hospital discharge on October 19.  Active bleeding is not suspected and stool testing for occult blood negative during this hospital stay.  -Monitor hemoglobin periodically    Squamous cell carcinoma of left tonsil   Metastatic cancer to liver s/p ablation of liver metastasis on 9/19/23  Abnormal abdomen CT with area of extrahepatic hypoattenuation  He is  status post chemoradiation of left tonsillar  mass in 2021.  At that time, he had malnutrition from inability to swallow safely and required G-tube feeding for period of time along with speech therapy evaluation and strengthening.  Initial mass responded well to treatment however since that time patient has had intermittent metastatic disease show up in his neck, in the rib, and most recently in the liver.  All have responded well to medical management.  He underwent ablation of liver metastasis at [facility] on 9/19/2023.  On abdomen CT during this hospital stay, he was found to have signs of previous ablation and liver mets but finding of 2 cm region of extrahepatic hypoattenuation raising concern for possibility of phlegmon or abscess which is new as compared with October 14.  It is unclear whether this radiographic abnormality is related to previous ablation of hepatic metastasis or more recent laparoscopic cholecystectomy.  Clinically, active infection is not suspected nor is he having worsening abdominal pain.  -Continue clinical monitoring  -If he were to develop fever or other signs of SIRS or worsening abdominal symptoms, would consider additional diagnostic evaluation of this radiographic abnormality  -Anticipate outpatient follow-up with oncology team at [facility] in November/December as previously planned    Elevated alkaline phosphatase  Serum alkaline phosphatase has been consistently elevated during this hospitalization and is higher than it was at hospital discharge on October 19 after his cholecystectomy and ERCP with sphincterotomy and biliary stent placement.  Other LFTs have been stable.  Aside from an area of focal biliary dilation on CT distal to one of the hepatic metastatic lesions, other biliary obstruction has not been suspected during this hospitalization.  Biliary infection has not been suspected so far.  -Monitor LFTs including alkaline phosphatase periodically    Abnormal chest  CT  Chest CT this hospitalization demonstrates opacity in the right lower lobe consistent with possible pneumonitis.  Clinically, pneumonia is not suspected at this time.  Recent aspiration is possible.  He is not having significant respiratory symptoms at this time.  -Continue clinical monitoring  -If he develops fever or worsening respiratory symptoms, consider additional evaluation and/or treatment for pneumonia including aspiration pneumonia    Enterococcus bacteremia 9/27/23   Acute cholecystitis and gallstone pancreatitis related to choledocholithiasis S/P ERCP, sphincterotomy with stent placement, and lap cholecystectomy 10/15/23-10/18/23  Patient hospitalized from 10/7/2023 through 10/10/2023 and again 10/15/2023 through 10/19/2023 due to symptomatic choledocholithiasis with gallstone pancreatitis and acute cholecystitis for which she had invasive intervention including sphincterotomy with common bile duct stent placement and laparoscopic cholecystectomy.  He had completed a prolonged course of antibiotic treatment with recent blood cultures negative.  He has not had fever or other signs of infection during this hospitalization.  Blood cultures obtained 10/29 are pending and he is not currently receiving antibiotics.  -Continue clinical monitoring, reconsider empiric antibiotic therapy if there is increasing concern for infection    Coronary atherosclerosis Status post insertion of drug-eluting stent into left anterior descending artery for coronary artery disease  Previous history of CAD and coronary artery stent without any recent symptoms or signs signs concerning for angina.  Patient chronically treated with on metoprolol XL 25 mg daily and Crestor 40 mg daily but not aspirin.    -Holding metoprolol for now due to low normal blood pressures and poor oral intake but anticipate restarting it as blood pressure trends upward  -Continue chronic dose of Crestor    Hypertension goal BP (blood pressure) <  "140/90  Chronic hypertension is normally treated with metoprolol XL 25 mg daily which has been held since admission due to low normal blood pressures and poor oral intake.  He has been normotensive.  Heart rate has trended upward.  -Continue to hold metoprolol today    Chronic kidney disease, stage 3  Baseline creatinine of 1.1-1.4 concerning for stage III chronic kidney disease.  Renal function is stable compared with this baseline.  -ordered recheck of renal function    Hypothyroidism due to acquired atrophy of thyroid  Chronic hypothyroidism appears stable on chronic dose of levothyroxine 100 mcg daily although it is possible that hyperthyroidism contributes to diarrhea.  Thyroid function test this hospitalization are essentially normal with borderline low free T4 in the context of inability to reliably tolerate oral medications due to GI symptoms.  -Continue chronic dose of levothyroxine for now and reassess based on thyroid function test results ordered today    Anxiety  Chronic anxiety has been stable on chronic dose of Celexa   -Continue chronic dose of Celexa    Hyperlipidemia LDL goal <130  Chronic hyperlipidemia normally treated with Crestor 40 mg daily  -Continue chronic dose of Crestor          Diet: Snacks/Supplements Adult: Ensure Enlive; With Meals  Pureed Diet (level 4) Thin Liquids (level 0)    DVT Prophylaxis: Enoxaparin (Lovenox) SQ  Stone Catheter: Not present  Lines: None     Cardiac Monitoring: None  Code Status: Full Code      Clinically Significant Risk Factors        # Hypokalemia: Lowest K = 3.2 mmol/L in last 2 days, will replace as needed       # Hypoalbuminemia: Lowest albumin = 2.4 g/dL at 10/30/2023  6:18 AM, will monitor as appropriate     # Hypertension: Noted on problem list        # Overweight: Estimated body mass index is 25.56 kg/m  as calculated from the following:    Height as of this encounter: 1.727 m (5' 8\").    Weight as of this encounter: 76.2 kg (168 lb 1.6 oz).      # " Financial/Environmental Concerns: none         Disposition Plan     Expected Discharge Date: 11/02/2023      Destination: home with family              Pedro Guillaume MD  Hospitalist Service  [facility]  Securely message with [employer] (more info)  Text page via [employer] Paging/Directory   ______________________________________________________________________    Interval History   Patient says he felt worse last evening.  He had good oral intake at supper.  However, he subsequently developed worsening nausea last evening and vomited.  This morning he continues to be nauseated.  He does not think he could tolerate any oral intake including his oral medications this morning.  He had a large bowel movement yesterday afternoon but his stool is starting to have more formed to it and the frequency of his stools is slowing.  He denies any abdominal pain.  He remains afebrile and hemodynamically stable.  Oxygenation is normal.  Urine output has been low.    Physical Exam   Vital Signs: Temp: 99.3  F (37.4  C) Temp src: Oral BP: 122/79 Pulse: 107   Resp: 18 SpO2: 96 % O2 Device: None (Room air)    Patient Vitals for the past 24 hrs:   BP Temp Temp src Pulse Resp SpO2   11/02/23 0730 122/79 99.3  F (37.4  C) Oral 107 18 96 %   11/01/23 2320 125/67 99  F (37.2  C) Oral 101 18 95 %   11/01/23 2040 136/78 99.1  F (37.3  C) Oral 100 18 98 %   11/01/23 1548 120/77 99.5  F (37.5  C) Oral 101 20 98 %       Weight: 168 lbs 1.6 oz    General Appearance: Chronically ill-appearing without signs of acute distress  Respiratory: Normal respiratory effort, clear lungs  Cardiovascular: Regular rate and rhythm, good radial pulse, normal capillary refill  GI: Normal bowel sounds, soft, nontender abdomen     Medical Decision Making             Data     I have personally reviewed the following data over the past 24 hrs:    N/A  \   N/A   / N/A     137 110 (H) 8.2 /  117 (H)   3.2 (L) 13 (L) 1.01 \     TSH: N/A T4: N/A  A1C: N/A       Phosphorus 2.7  Giardia stool antigen test results are pending  TSH 1.36 yesterday with free T4 0.87    Recent Labs   Lab 11/02/23  0516 11/01/23  1240 11/01/23  0638 10/31/23  1419 10/31/23  1012 10/31/23  0929 10/31/23  0617 10/30/23  0822 10/30/23  0618 10/29/23  0651 10/29/23  0528 10/28/23  1817   WBC  --   --   --   --   --   --   --   --  7.8  --  10.1 8.7   HGB  --   --   --   --   --   --  10.7*  --  9.9*  --  11.2* 12.2*   MCV  --   --   --   --   --   --   --   --  86  --  87 82   PLT  --   --   --   --   --   --   --   --  197  --  223 243     --  137 136   < >  --  138  --  136  --  138 136   POTASSIUM 3.2* 3.9 3.3* 3.5   < >  --  4.3   < > 3.3*   < > 2.7* 2.2*   CHLORIDE 110*  --  112* 110*   < >  --  112*  --  112*  --  111* 106   CO2 13*  --  13* 14*   < >  --  14*  --  13*  --  15* 17*   BUN 8.2  --  7.4*  --   --   --  7.1*  --  6.6*  --  7.6* 8.3   CR 1.01  --  1.05  --   --   --  1.00  --  1.00  --  1.10 1.26*   ANIONGAP 14  --  12 12   < >  --  12  --  11  --  12 13   LATRELL 8.9  --  9.0  --   --   --  9.2  --  9.2  --  9.5 9.8   *  --  85  --   --  103* 87   < > 89   < > 81 101*   ALBUMIN  --   --  2.7*  --   --   --  2.6*  --  2.4*  --  2.7* 3.3*   PROTTOTAL  --   --  5.4*  --   --   --  5.7*  --  5.0*  --  5.5* 6.2*   BILITOTAL  --   --  0.4  --   --   --  0.5  --  0.4  --  0.6 0.5   ALKPHOS  --   --  318*  --   --   --  353*  --  286*  --  318* 353*   ALT  --   --  15  --   --   --  18  --  13  --  13 16   AST  --   --  26  --   --   --  46*  --  21  --  17 22   LIPASE  --   --   --   --   --   --   --   --   --   --   --  132*    < > = values in this interval not displayed.

## 2023-11-02 NOTE — PLAN OF CARE
Goal Outcome Evaluation:      Plan of Care Reviewed With: patient    Overall Patient Progress: improvingOverall Patient Progress: improving    Outcome Evaluation: oxycodone given x1 for throat pain prior to dinner. patient was able to take their salt tabs dissolved in cola without pain/discomfort to mouth. Patient requesting to be given ambien and melatonin to help sleep tonight. Scopolamine patch is helping with nausea. poor appetite for dinner tonight.

## 2023-11-02 NOTE — PLAN OF CARE
Goal Outcome Evaluation:      Plan of Care Reviewed With: patient    Overall Patient Progress: no changeOverall Patient Progress: no change         Patient alert and oriented. Vital signs stable, room air. Independent in room. Patient tolerating medications that are ordered today. Denied shower today.

## 2023-11-02 NOTE — PLAN OF CARE
Goal Outcome Evaluation:           Overall Patient Progress: improvingOverall Patient Progress: improving    Outcome Evaluation: Pt is A&0x4 VSS. Pt had one BM yesterday and one small emesis this shift. Pt thinks it was related to questran. Noted to be on muliple different liquid medications of different flavors. Pt refused questran and liquid K+ at HS. Recieved Zofran x1, 10mg of Oxy x1, Ambian and melatonin x1. Pt is up Independent in room. Educated on healtier drink options and not to chugg carbonated beverage but Pt is noted to continue to drink only coke and sprite. PHos and K recheaks this morning. Wife called for update and NOC nurse was unable to reach her when called back.

## 2023-11-03 PROBLEM — R11.2 NAUSEA WITH VOMITING: Status: ACTIVE | Noted: 2023-01-01

## 2023-11-03 NOTE — PLAN OF CARE
"Goal Outcome Evaluation:      Plan of Care Reviewed With: patient, spouse          Outcome Evaluation: Pt alert, forget at times, easily reoriented. receiving Oxycodone for throat pain. pt reports \"no appetite\" pt insisting drinking Sprite and ice chips despite orders for mild thick liquids. Intermittently has to clear throat after drinking. tolerating meds crushed/mixed in pudding. steady gait, needing supervision to ambulate in halls d/t forgetful. will continue wit POC.      "

## 2023-11-03 NOTE — PROGRESS NOTES
Care Management Discharge Note    Discharge Date: Expected 11/03/2023       Discharge Disposition: Home    Discharge Services: None    Discharge DME: None    Discharge Transportation: family or friend will provide    Private pay costs discussed: Not applicable    Does the patient's insurance plan have a 3 day qualifying hospital stay waiver?  No    Patient/family educated on Medicare website which has current facility and service quality ratings: no    Education Provided on the Discharge Plan: No    Persons Notified of Discharge Plans: Patient, Wife     Patient/Family in Agreement with the Plan: yes    Handoff Referral Completed: Yes    Additional Information:  Care Management has been following for discharge planning and due to elevated risk for hospital readmission.      Per provider at 2 pm rounds today, patient may be ready for discharge this evening.  Patient denies any discharge needs.  Wife is supportive and helps patient with any care needs.      Handoff sent to clinic care coordination due to elevated risk for readmission.      Family transport at discharge.     LYN Diallo  [email or web address]   [phone]

## 2023-11-03 NOTE — PROGRESS NOTES
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   General Observations patient appreared slightly confused at times, responding to questions with off-task comments   Pain Assessment   Patient Currently in Pain No   Type of Evaluation   Type of Evaluation Swallow Evaluation   VFSS Evaluation   Radiologist Dr. Demetria White   Views Taken left lateral;A/P   Physical Location of Procedure [place], [place]   VFSS Textures Trialed thin liquids;mildly thick liquids;pureed;soft & bite-sized   VFSS Eval: Thin Liquid Texture Trial   Mode of Presentation, Thin Liquid cup;spoon;self-fed   Order of Presentation 1, 2, 3, 13   Preparatory Phase poor bolus control   Oral Phase, Thin Liquid WFL;premature pharyngeal entry   Bolus Location When Swallow Triggered posterior laryngeal surface of epiglottis   Pharyngeal Phase, Thin Liquid impaired hyolaryngeal excursion;impaired epiglottic movement;pharyngeal wall coating;residue in vallecula;residue in pyriform sinus   Rosenbek's Penetration Aspiration Scale: Thin Liquid Trial Results 7 - contrast passes glottis, visible subglottic residue remains despite patient's response (aspiration)   Response to Aspiration unproductive reflexive cough   Strategies and Compensations chin tuck;reduce bolus size;double swallow   Diagnostic Statement aspiration of larger bolus trials, penetration across all trials   VFSS Eval: Mildly Thick Liquids   Mode of Presentation cup;self-fed   Order of Presentation 4, 5, 6, 7, 8, 11   Preparatory Phase WFL   Oral " Phase WFL;premature pharyngeal entry   Bolus Location When Swallow Triggered valleculae   Pharyngeal Phase pharyngeal wall coating;residue in vallecula;residue in pyriform sinus;impaired epiglottic movement;impaired hyolaryngel excursion;impaired tongue base retraction   Rosenbek's Penetration Aspiration Scale 1 - no aspiration, contrast does not enter airway   Strategies and Compensations reduce bolus size;double swallow;chin tuck   VFSS Evaluation: Puree Solid Texture Trial   Mode of Presentation, Puree spoon;self-fed   Order of Presentation 9, 10   Preparatory Phase WFL;poor bolus control   Oral Phase, Puree premature pharyngeal entry;residue in oral cavity;impaired AP movement   Bolus Location When Swallow Triggered posterior laryngeal surface of epiglottis   Pharyngeal Phase, Puree impaired hyolaryngel excursion;impaired epiglottic movement;impaired tongue base retraction;pharyngeal wall coating;residue in vallecula;residue in pyriform sinus   Rosenbek's Penetration Aspiration Scale: Puree Food Trial Results 1 - no aspiration, contrast does not enter airway   Strategies and Compensations liquid wash;double swallow;reduce bolus size   Diagnostic Statement significant pharyngeal residual.  reduces with small bolus sixe, liquid wash, and double swallow   VFSS Eval: Soft & Bite Sized   Mode of Presentation spoon;self-fed   Order of presentation 12   Preparatory Phase poor bolus control;prolonged bolus preparation   Oral Phase impaired AP movement;residue in oral cavity;premature phrayngeal entry   Bolus Location When Swallow Triggered posterior laryngeal surface of epiglottis   Pharyngeal Phase impaired epiglottic movement;impaired tongue base retraction;impaired hyolaryngel excursion;pharyngeal wall coating;residue in vallecula;residue in pyriform sinus;impaired pharyngoesophageal segment opening   Rosenbek's Penetration Aspiration Scale 1 - no aspiration, contrast does not enter airway   Strategies and  Compensations reduce bolus size;liquid wash;double swallow   Diagnostic Statement significant pharyngeal residual. reduces with small bolus sixe, liquid wash, and double swallow   Esophageal Phase of Swallow   Esophageal sweep performed during today s vidofluoroscopic exam  Yes;Please refer to radiologist's report for details   Swallowing Recommendations   Diet Consistency Recommendations mildly thick liquids (level 2);pureed (level 4)   Supervision Level for Intake close supervision needed   Mode of Delivery Recommendations bolus size, small;no straws;slow rate of intake   Postural Recommendations chin tuck   Swallowing Maneuver Recommendations alternate food and liquid intake;double dry swallow   Monitoring/Assistance Required (Eating/Swallowing) stop eating activities when fatigue is present;monitor for cough or change in vocal quality with intake   Recommended Feeding/Eating Techniques (Swallow Eval) maintain upright sitting position for eating;maintain upright posture during/after eating for 30 minutes;minimize distractions during oral intake;set-up and prepare tray   Medication Administration Recommendations, Swallowing (SLP) pills crushed and placed in pudding   General Therapy Interventions   Intervention Comments To maximize safety with oral intake and reduce risk of aspiration and pneumonia   Clinical Impression   Criteria for Skilled Therapeutic Interventions Met (SLP Eval) Yes, treatment indicated   Activity Limitations Related to Problem List (SLP) reduced safety of oral intake   Risks & Benefits of therapy have been explained risks/benefits reviewed;spouse/significant other;participants voiced agreement with care plan   Clinical Impression Comments Completed VFSS with patient in upright position and tolerating the position well. Both lateral views and AP views completed. The patient fed himself with cup and spoon, following simplified directions with occasional need for repetitions.   Liquid trials  included thin and mildly thick liquids. Food trials included pureed foods and soft and bite size foods. No straw trials completed.  Overall, Patient presents. With moderate. Oropharyngeal dysphasia characterized by aspiration and penetration of thin liquids and pharyngeal residual across all trials.  Specifically, regarding thin liquid trials, aspiration of residual of large thin liquid trial noted and penetration of smaller trials during swallow due to ineffective epiglottal tilt and reduced closure.  Tolerated mildly thickened liquids without aspiration or penetration.  With food trials, no aspiration or penetration noted, however, approximately 50% of bolus remained throughout the pharynx after first swallow and additional 20% cleared with second swallow, but when chin tuck and liquid wash utilized, oropharyngeal residual reduced to approximately 15%.  Recommend diet of mildly thick liquids, pureed foods, and pills crushed and placed in pudding.  Patient to be cued to alternate food and drink 1:1 and take small sips and bites, completing a second swallow after each bite/sip.  Consulted with nursing and family, replaying results of study, diet level recommendations, and effective compensatory swallowing strategies.  Wife and patient agreed to recommendations.  Will follow up with Dr. Guillaume.   SLP Total Evaluation Time   Evaluation, videofluoroscopic eval of swallow function Minutes (22385) 32   SLP Goals   Therapy Frequency (SLP Eval) 2 times/week   SLP Predicted Duration/Target Date for Goal Attainment 11/06/23   SLP Goals Swallow   SLP: Safely tolerate diet without signs/symptoms of aspiration Mildly thick liquids;Pureed diet   SLP Discharge Planning   SLP Plan SLP will continue to follow during length of stay   SLP Discharge Recommendation home with outpatient therapy services   SLP Rationale for DC Rec to maximize safety of oral intake   SLP Brief overview of current status  pureed foods, mildly thickened  liquids, crushed meds with pudding carrier   Total Session Time   Total Session Time (sum of timed and untimed services) 32   Psychosocial Support   Trust Relationship/Rapport care explained;choices provided;emotional support provided;empathic listening provided;questions answered   Family/Support System Care caregiver stress acknowledged;self-care encouraged     Opal Stevens) Johnie Alford MA, CCC-SLP    [facility]  [email or web address]  Direct Line: [phone]

## 2023-11-03 NOTE — PLAN OF CARE
"  Problem: Adult Inpatient Plan of Care  Goal: Plan of Care Review  Description: The Plan of Care Review/Shift note should be completed every shift.  The Outcome Evaluation is a brief statement about your assessment that the patient is improving, declining, or no change.  This information will be displayed automatically on your shift  note.  Outcome: Progressing  Flowsheets (Taken 11/3/2023 7824)  Outcome Evaluation: Patient disoriented to time and place. Reports feeling tired and confused. Noted a confused look on face. Reported to provider and scopolamine patch d/c. Patient reported feeling less confused and \"foggy\" later in shift. Pain managed with Oxycodone 10 mg x1 PRN at HS. Ambian given for sleep. Urine sample for lab. SBA with bed alarm due to confusion. Patient call light appropriate. Now at 0600 patient is A&Ox4 and looks much clearer. Denies pain and nausea this AM.  Overall Patient Progress: no change     "

## 2023-11-03 NOTE — PROGRESS NOTES
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      Inadequate oral nutritional intake  Nausea and vomiting  Moderate oropharyngeal dysphagia  Hypoalbuminemia  History of inadequate PO intake requiring G-tube feeding in 2021 following chemoradiation treatment of squamous cell carcinoma of the left tonsil  He had no recent swallowing problems prior to his liver ablation in September 2023 but started noticing some difficulty in swallowing solids and larger pills prior to his recent hospitalizations earlier this month.  Since hospital discharge on 10/19/2023, he had progressively increasing swallowing difficulty and has either not been able to swallow solid foods or large pills or has had to expectorate them shortly after trying to swallow because they get stuck in his upper throat.  As a consequence, his oral nutritional intake has been inadequate.  No gross causative abnormality noted on exam or radiographically after CT scan of the neck and chest.  Oral intake started to significantly improve as of 10/30.  Speech-language pathology evaluation on 10/31 and video swallow on 11/2 were significantly abnormal concerning for moderate oropharyngeal dysphagia for which  modified diet with mildly thickened liquids and puréed solids has been advised.  Throughout hospitalization he has been troubled by recurring nausea and vomiting such that inadequate oral intake persists, but nausea does appear to have improved over the last 24 hours although oral intake remains very limited.  Serum albumin was low at admission and continues to be low likely due to inadequate nutritional intake.    -Continue puréed solids with mildly thickened liquids   -Nursing may crush pills and or dissolve in liquid if needed  -Registered dietitian assisting with recommendations for optimizing nutritional status  -Monitor serum albumin as indicated  -Continue scopolamine patch due to refractory nausea  -Continue Zofran and add Compazine as needed for nausea    Metabolic acidosis with normal anion gap  Diarrhea, suspect ileus  Patient was admitted with metabolic acidosis and normal anion gap that also appears to have been present during his previous hospitalization.  He has intermittent compensatory respiratory alkalosis.  He has persistent diarrhea which he says began after his cholecystectomy raising suspicion for postcholecystectomy diarrheal syndrome.  Suspect metabolic acidosis is due to stool bicarbonate losses from diarrhea.  Metabolic acidosis may be causing ileus that in turn is causing recurring nausea and vomiting and persistent diarrhea.  Stool testing for Giardia was negative and other enteric infection including clostridia difficile has not been suspected.  Diarrhea is now improving after starting scheduled doses of Imodium.  Hyperchloremia after previous IV fluid treatment with chloride and frequent potassium chloride supplementation may have exacerbated metabolic acidosis, but hyperchloremia has resolved after transition to other forms of potassium supplementation.  -Continue oral bicarbonate supplementation 4 times daily  -Ordered recheck of electrolytes and blood gases  -Continue scheduled dose  of Imodium twice daily but may need to decrease dosing as diarrhea resolves because of his risk of constipation  -Minimizing chloride administration as able    Hypokalemia  Hypophosphatemia  He had hypokalemia during hospitalization on 10/7/2023 that was attributed to poor oral intake, nausea, and diarrhea.  Patient discharged from the last 2 hospitalizations in October with potassium supplementation at home which the patient has not tolerated due to inability to swallow the large pill.  Admitted with potassium 2.2 and potassium has intermittently normalized after additional potassium supplementation, but hypokalemia continues to recur probably because of inadequate oral intake, ongoing stool losses, intermittent patient refusal of oral potassium supplementation, and metabolic acidosis causing electrolyte shifts.  Ongoing and recurring hypokalemia continues to prolong his hospitalization.  Hyperaldosteronism is a consideration but not strongly suspected.  There is concern that recurring hypokalemia exacerbates ileus.  Noted to have hypophosphatemia likely due to inadequate oral nutritional intake and which could contribute to weakness and/or ileus.  -Not using standard potassium supplementation protocol because protocol includes treatment with potassium chloride tablets and he has had difficulty tolerating the tablets due to swallowing problems  -Ordered potassium chloride powder 40 mEq 3 times a day which he has been able to tolerate with swallowing but may need to de-escalate and/or stop potassium chloride if he develops recurrent hyperchloremia  -Continue potassium phosphorus supplementation but increase to 2 packets 3 times a day  -Ordered recheck of potassium in a.m.  -Continue phosphorus supplementation per standard protocol  -Consider adding spironolactone for renal potassium sparing effect if hypokalemia continues to recur despite escalating potassium supplementation    Anemia, unspecified type  Mildly  anemic with hemoglobin 9-10, similar to hemoglobin at time of recent hospital discharge on October 19.  Active bleeding is not suspected and stool testing for occult blood negative during this hospital stay.  -Monitor hemoglobin periodically    Squamous cell carcinoma of left tonsil   Metastatic cancer to liver s/p ablation of liver metastasis on 9/19/23  Abnormal abdomen CT with area of extrahepatic hypoattenuation  He is status post chemoradiation of left tonsillar  mass in 2021.  At that time, he had malnutrition from inability to swallow safely and required G-tube feeding for period of time along with speech therapy evaluation and strengthening.  Initial mass responded well to treatment however since that time patient has had intermittent metastatic disease show up in his neck, in the rib, and most recently in the liver.  All have responded well to medical management.  He underwent ablation of liver metastasis at [facility] on 9/19/2023.  On abdomen CT during this hospital stay, he was found to have signs of previous ablation and liver mets but finding of 2 cm region of extrahepatic hypoattenuation raising concern for possibility of phlegmon or abscess which is new as compared with October 14.  It is unclear whether this radiographic abnormality is related to previous ablation of hepatic metastasis or more recent laparoscopic cholecystectomy.  Clinically, active infection is not suspected nor is he having worsening abdominal pain.  -Continue clinical monitoring  -If he were to develop fever or other signs of SIRS or worsening abdominal symptoms, would consider additional diagnostic evaluation of this radiographic abnormality  -Anticipate outpatient follow-up with oncology team at [facility] in November/December as previously planned    Elevated alkaline phosphatase  Serum alkaline phosphatase has been consistently elevated during this hospitalization and is higher than it was at hospital discharge on October  19 after his cholecystectomy and ERCP with sphincterotomy and biliary stent placement.  Other LFTs have been stable.  Aside from an area of focal biliary dilation on CT distal to one of the hepatic metastatic lesions, other biliary obstruction has not been suspected during this hospitalization.  Biliary infection has not been suspected so far.  -Monitor LFTs including alkaline phosphatase periodically    Abnormal chest CT  Chest CT this hospitalization demonstrates opacity in the right lower lobe consistent with possible pneumonitis.  Clinically, pneumonia is not suspected at this time.  Recent aspiration is possible.  He is not having significant respiratory symptoms at this time.  -Continue clinical monitoring  -If he develops fever or worsening respiratory symptoms, consider additional evaluation and/or treatment for pneumonia including aspiration pneumonia    Enterococcus bacteremia 9/27/23   Acute cholecystitis and gallstone pancreatitis related to choledocholithiasis S/P ERCP, sphincterotomy with stent placement, and lap cholecystectomy 10/15/23-10/18/23  Patient hospitalized from 10/7/2023 through 10/10/2023 and again 10/15/2023 through 10/19/2023 due to symptomatic choledocholithiasis with gallstone pancreatitis and acute cholecystitis for which she had invasive intervention including sphincterotomy with common bile duct stent placement and laparoscopic cholecystectomy.  He had completed a prolonged course of antibiotic treatment with recent blood cultures negative.  He has not had fever or other signs of infection during this hospitalization.  Blood cultures obtained 10/29 are pending and he is not currently receiving antibiotics.  -Continue clinical monitoring, reconsider empiric antibiotic therapy if there is increasing concern for infection    Coronary atherosclerosis Status post insertion of drug-eluting stent into left anterior descending artery for coronary artery disease  Previous history of CAD and  coronary artery stent without any recent symptoms or signs signs concerning for angina.  Patient chronically treated with on metoprolol XL 25 mg daily and Crestor 40 mg daily but not aspirin.    -Holding metoprolol for now due to low normal blood pressures and poor oral intake but anticipate restarting it as blood pressure trends upward  -Continue chronic dose of Crestor    Hypertension goal BP (blood pressure) < 140/90  Chronic hypertension is normally treated with metoprolol XL 25 mg daily which has been held since admission due to low normal blood pressures and poor oral intake.  He has been normotensive.  Heart rate has trended upward.  -Continue to hold metoprolol today    Chronic kidney disease, stage 3  Baseline creatinine of 1.1-1.4 concerning for stage III chronic kidney disease.  Renal function is stable compared with this baseline.  -ordered recheck of renal function    Hypothyroidism due to acquired atrophy of thyroid  Chronic hypothyroidism appears stable on chronic dose of levothyroxine 100 mcg daily although it is possible that hyperthyroidism contributes to diarrhea.  Thyroid function test this hospitalization are essentially normal with borderline low free T4 in the context of inability to reliably tolerate oral medications due to GI symptoms.  -Continue chronic dose of levothyroxine for now and reassess based on thyroid function test results ordered today    Anxiety  Chronic anxiety has been stable on chronic dose of Celexa   -Continue chronic dose of Celexa    Hyperlipidemia LDL goal <130  Chronic hyperlipidemia normally treated with Crestor 40 mg daily  -Continue chronic dose of Crestor          Diet: Pureed Diet (level 4) Mildly Thick (level 2)  Snacks/Supplements Adult: Other; Mildly Thick Ensure enlive; With Meals    DVT Prophylaxis: Enoxaparin (Lovenox) SQ  Stone Catheter: Not present  Lines: None     Cardiac Monitoring: None  Code Status: Full Code      Clinically Significant Risk Factors     "    # Hypokalemia: Lowest K = 3 mmol/L in last 2 days, will replace as needed       # Hypoalbuminemia: Lowest albumin = 2.4 g/dL at 10/30/2023  6:18 AM, will monitor as appropriate     # Hypertension: Noted on problem list        # Overweight: Estimated body mass index is 25.56 kg/m  as calculated from the following:    Height as of this encounter: 1.727 m (5' 8\").    Weight as of this encounter: 76.2 kg (168 lb 1.6 oz).      # Financial/Environmental Concerns: none         Disposition Plan     Expected Discharge Date: 11/03/2023    Discharge Delays: Lab Result Pending (enter specific test & time in comments)  Destination: home with family  Discharge Comments: adjusting medications and rechecking electrolytes            Pedro Guillaume MD  Hospitalist Service  [facility]  Securely message with [employer] (more info)  Text page via [employer] Paging/Directory   ______________________________________________________________________    Interval History   There were no significant overnight events.  Patient says he feels better today.  However, his oral intake remains limited and nursing reports that he has been eating very little at meals.  He has not had a stool since yesterday.  He denies any nausea.  He remains afebrile and hemodynamically stable with normal oxygenation.  He is voiding spontaneously.  He denies any abdominal pain.    Physical Exam   Vital Signs: Temp: 98.6  F (37  C) Temp src: Oral BP: 127/80 Pulse: 90   Resp: 22 SpO2: 97 % O2 Device: None (Room air)    Patient Vitals for the past 24 hrs:   BP Temp Temp src Pulse Resp SpO2   11/03/23 1543 127/80 98.6  F (37  C) Oral 90 22 --   11/03/23 0812 138/74 99.5  F (37.5  C) Oral 98 22 97 %   11/02/23 2330 127/77 97.1  F (36.2  C) Oral 91 24 97 %       General Appearance: Chronically ill-appearing man without signs of acute distress sitting in bed  Respiratory: Normal respiratory effort, clear lungs  Cardiovascular: Regular rate and " rhythm, good radial pulse, normal capillary refill     Medical Decision Making             Data     I have personally reviewed the following data over the past 24 hrs:    N/A  \   N/A   / N/A     135 106 9.2 /  87   3.1 (L) 18 (L) 1.04 \     ALT: 13 AST: 19 AP: 280 (H) TBILI: 0.5   ALB: 2.7 (L) TOT PROTEIN: 5.6 (L) LIPASE: N/A         Venous Blood Gas  Recent Labs   Lab 11/03/23  1221 11/03/23  0557 11/02/23  1804 11/02/23  1405   PHV 7.36 7.35 7.37 7.31*   PCO2V 36* 38* 29* 39*   PO2V 38 27 47 23*   HCO3V 20* 21 17* 20*   RASHAWN -5.2 -4.3 -7.0 -5.9   O2PER 21 21 21 21     Imaging results reviewed over the past 24 hrs:   Recent Results (from the past 24 hour(s))   XR Video Swallow with SLP or OT    Narrative    XR VIDEO SWALLOW WITH SLP OR OT 11/3/2023 9:46 AM    HISTORY: Dysphasia. History of chemoradiation treatment of tonsillar  cancer in 2021.    COMPARISON: 4/27/2021    VIDEO ESOPHAGEAL SWALLOWING STUDY: Flash penetration into the tracheal  vestibule to the level of the vocal cords with thin liquid. After each  swallow this subsequently dribbles below the vocal cords and the  patient clears most of it with throat clearing. Penetration into the  tracheal vestibule which dribbles below the cords with nectar  thickened. No penetration with nectar thickened during chin tuck  maneuver. Small amount residual in the vallecula with puree, some of  which clears with a second swallow. Penetration into the tracheal  vestibule with semisolid. Reflux into the posterior nasal pharynx with  pureed and semisolid. Solid consistency and tablet passed  satisfactorily.    Please refer to speech pathology report for additional commentary.    LAURA MENDEZ MD         SYSTEM ID:  [id number]     Recent Labs   Lab 11/03/23  1753 11/03/23  1221 11/03/23  0557 11/02/23  1804 11/02/23  1006 11/02/23  0516 11/01/23  1240 11/01/23  0638 10/31/23  0929 10/31/23  0617 10/30/23  0822 10/30/23  0618 10/29/23  0651 10/29/23  0528  10/28/23  1817   WBC  --   --   --   --   --   --   --   --   --   --   --  7.8  --  10.1 8.7   HGB  --   --   --   --   --   --   --   --   --  10.7*  --  9.9*  --  11.2* 12.2*   MCV  --   --   --   --   --   --   --   --   --   --   --  86  --  87 82   PLT  --   --   --   --   --   --   --   --   --   --   --  197  --  223 243   NA  --  135 136 133*   < > 137  --  137   < > 138  --  136  --  138 136   POTASSIUM 3.1* 3.0* 3.1* 3.4   < > 3.2*   < > 3.3*   < > 4.3   < > 3.3*   < > 2.7* 2.2*   CHLORIDE  --  106 107 104   < > 110*  --  112*   < > 112*  --  112*  --  111* 106   CO2  --  18* 18* 14*   < > 13*  --  13*   < > 14*  --  13*  --  15* 17*   BUN  --   --  9.2  --   --  8.2  --  7.4*  --  7.1*  --  6.6*  --  7.6* 8.3   CR  --   --  1.04  --   --  1.01  --  1.05  --  1.00  --  1.00  --  1.10 1.26*   ANIONGAP  --  11 11 15   < > 14  --  12   < > 12  --  11  --  12 13   LATRELL  --   --  9.0  --   --  8.9  --  9.0  --  9.2  --  9.2  --  9.5 9.8   GLC  --   --  87  --   --  117*  --  85   < > 87   < > 89   < > 81 101*   ALBUMIN  --   --  2.7*  --   --   --   --  2.7*  --  2.6*  --  2.4*  --  2.7* 3.3*   PROTTOTAL  --   --  5.6*  --   --   --   --  5.4*  --  5.7*  --  5.0*  --  5.5* 6.2*   BILITOTAL  --   --  0.5  --   --   --   --  0.4  --  0.5  --  0.4  --  0.6 0.5   ALKPHOS  --   --  280*  --   --   --   --  318*  --  353*  --  286*  --  318* 353*   ALT  --   --  13  --   --   --   --  15  --  18  --  13  --  13 16   AST  --   --  19  --   --   --   --  26  --  46*  --  21  --  17 22   LIPASE  --   --   --   --   --   --   --   --   --   --   --   --   --   --  132*    < > = values in this interval not displayed.

## 2023-11-04 NOTE — TELEPHONE ENCOUNTER
Reason for Call:  Appointment Request    Patient requesting this type of appt:  Hospital/ED Follow-Up     Requested provider: Monico Rivers    Reason patient unable to be scheduled: Not within requested timeframe    When does patient want to be seen/preferred time: 3-7 days    Comments: Patient discharged on 11-04-23 from [facility] for dehydration.  Needs a hospital follow-up within 7 days.      Could we send this information to you in [facility] or would you prefer to receive a phone call?:   Patient would prefer a phone call   Okay to leave a detailed message?:  at Home number on file [phone]  Call taken on 11/4/2023 at 10:09 AM by Destiny Fontanez

## 2023-11-04 NOTE — PLAN OF CARE
Goal Outcome Evaluation:      Plan of Care Reviewed With: patient    Overall Patient Progress: no changeOverall Patient Progress: no change    Outcome Evaluation: Pt alert, intermittent confusion noted. Pt c/o throat pain, 10mg Oxy given. VSS on room air. Pt insists on drinking ice with thin soda provided by family.     Update:  Pot 3.5  Phos 3.3, no replacement per protocol - recheck in AM.

## 2023-11-04 NOTE — PLAN OF CARE
Goal Outcome Evaluation:      Plan of Care Reviewed With: patient          Outcome Evaluation: Patient alert to self and place, vss, poor appetite, drinking sprite, finished autumn ensure, K+ 3.1, patient given potassium chloride and potassium/sodium phosphates, recheck in AM, PRN oxy x2, ambien, and melatonin, release of info paperwork signed and placed in chart, indep

## 2023-11-04 NOTE — PLAN OF CARE
S-(situation): Patient discharged to home via wheelchair to private vehicle with spouse.    B-(background): Hypokalemia and dehydration    A-(assessment): Potasium was 3.5 this morning and phosphorus was 3.3. Drank electrolytes in thickened soda per request slowly. Pain in throat was 5-6 and received Oxycodone to help decrease the pain. Forgetful and a bit confused this morning talking about drinking some wine with his cheese. VSS. PCO2 was 38. Anxious to get home.     R-(recommendations): Discharge instructions reviewed with patient and his wife. Listed belongings gathered and returned to patient. A message was left with Monico Rivers's office to call patient about working him in for a follow up appointment. Wife and patient aware.        Discharge Nursing Criteria:     Care Plan and Patient education resolved: Yes    New Medications- pt has been educated about purpose and side effects: Yes    Vaccines  Influenza status verified at discharge:  Yes    MISC  Prescriptions if needed, hard copies sent with patient  Yes  Home medications returned to patient: NA  Medication Bin checked and emptied on discharge Yes  Patient reports post-discharge pain management plan is effective: Yes

## 2023-11-04 NOTE — DISCHARGE INSTRUCTIONS
Follow-up with PCP in 1 week\  BMP early next week    Give letter to all your consultants with PCP phone and addresss - that way, someone can add it tinot their system and your PCP can get CC on results    Ask or print results of any  CT abdomen or neck or other imaging ( eg PET) , any procedures and all lab results- keep them in a binder at home sorted by month.    Potassium and phosphorus rich foods - see list    Monico Rivers's office will call you with a work-in appointment time next week. Call the clinic if you have not heard back by Monday afternoon.

## 2023-11-04 NOTE — PROGRESS NOTES
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      Inadequate oral nutritional intake  Nausea and vomiting  Moderate oropharyngeal dysphagia  Hypoalbuminemia  History of inadequate PO intake requiring G-tube feeding in 2021 following chemoradiation treatment of squamous cell carcinoma of the left tonsil  He had no recent swallowing problems prior to his liver ablation in September 2023 but started noticing some difficulty in swallowing solids and larger pills prior to his recent hospitalizations earlier this month.  Since hospital discharge on 10/19/2023, he had progressively increasing swallowing difficulty and has either not been able to swallow solid foods or large pills or has had to expectorate them shortly after trying to swallow because they get stuck in his upper throat.  As a consequence, his oral nutritional intake has been inadequate.  No gross causative abnormality noted on exam or radiographically after CT scan of the neck and chest.  Oral intake started to significantly improve as of 10/30.  Speech-language pathology evaluation on 10/31 and video swallow on 11/2 were significantly abnormal concerning for moderate oropharyngeal dysphagia for which  modified diet with mildly thickened liquids and puréed solids has been advised.  Throughout hospitalization he has been troubled by recurring nausea and vomiting such that inadequate oral intake persists, but nausea does appear to have improved over the last 24 hours although oral intake remains very limited.  Serum albumin was low at admission and continues to be low likely due to inadequate nutritional intake.    -Continue puréed solids with mildly thickened liquids   -Nursing may crush pills and or dissolve in liquid if needed  -Registered dietitian assisting with recommendations for optimizing nutritional status  -Monitor serum albumin as indicated  -Continue scopolamine patch due to refractory nausea  -Continue Zofran and add Compazine as needed for nausea    Metabolic acidosis with normal anion gap  Diarrhea, suspect ileus  Patient was admitted with metabolic acidosis and normal anion gap that also appears to have been present during his previous hospitalization.  He has intermittent compensatory respiratory alkalosis.  He has persistent diarrhea which he says began after his cholecystectomy raising suspicion for postcholecystectomy diarrheal syndrome.  Suspect metabolic acidosis is due to stool bicarbonate losses from diarrhea.  Metabolic acidosis may be causing ileus that in turn is causing recurring nausea and vomiting and persistent diarrhea.  Stool testing for Giardia was negative and other enteric infection including clostridia difficile has not been suspected.  Diarrhea is now improving after starting scheduled doses of Imodium.  Hyperchloremia after previous IV fluid treatment with chloride and frequent potassium chloride supplementation may have exacerbated metabolic acidosis, but hyperchloremia has resolved after transition to other forms of potassium supplementation.  -Continue oral bicarbonate supplementation 4 times daily  -Ordered recheck of electrolytes and blood gases  -Continue scheduled dose  of Imodium twice daily but may need to decrease dosing as diarrhea resolves because of his risk of constipation  -Minimizing chloride administration as able    Hypokalemia  Hypophosphatemia  He had hypokalemia during hospitalization on 10/7/2023 that was attributed to poor oral intake, nausea, and diarrhea.  Patient discharged from the last 2 hospitalizations in October with potassium supplementation at home which the patient has not tolerated due to inability to swallow the large pill.  Admitted with potassium 2.2 and potassium has intermittently normalized after additional potassium supplementation, but hypokalemia continues to recur probably because of inadequate oral intake, ongoing stool losses, intermittent patient refusal of oral potassium supplementation, and metabolic acidosis causing electrolyte shifts.  Ongoing and recurring hypokalemia continues to prolong his hospitalization.  Hyperaldosteronism is a consideration but not strongly suspected.  There is concern that recurring hypokalemia exacerbates ileus.  Noted to have hypophosphatemia likely due to inadequate oral nutritional intake and which could contribute to weakness and/or ileus.  -Not using standard potassium supplementation protocol because protocol includes treatment with potassium chloride tablets and he has had difficulty tolerating the tablets due to swallowing problems  -Ordered potassium chloride powder 40 mEq 3 times a day which he has been able to tolerate with swallowing but may need to de-escalate and/or stop potassium chloride if he develops recurrent hyperchloremia  -Continue potassium phosphorus supplementation but increase to 2 packets 3 times a day  -Ordered recheck of potassium in a.m.  -Continue phosphorus supplementation per standard protocol  -Consider adding spironolactone for renal potassium sparing effect if hypokalemia continues to recur despite escalating potassium supplementation    Anemia, unspecified type  Mildly  anemic with hemoglobin 9-10, similar to hemoglobin at time of recent hospital discharge on October 19.  Active bleeding is not suspected and stool testing for occult blood negative during this hospital stay.  -Monitor hemoglobin periodically    Squamous cell carcinoma of left tonsil   Metastatic cancer to liver s/p ablation of liver metastasis on 9/19/23  Abnormal abdomen CT with area of extrahepatic hypoattenuation  He is status post chemoradiation of left tonsillar  mass in 2021.  At that time, he had malnutrition from inability to swallow safely and required G-tube feeding for period of time along with speech therapy evaluation and strengthening.  Initial mass responded well to treatment however since that time patient has had intermittent metastatic disease show up in his neck, in the rib, and most recently in the liver.  All have responded well to medical management.  He underwent ablation of liver metastasis at [facility] on 9/19/2023.  On abdomen CT during this hospital stay, he was found to have signs of previous ablation and liver mets but finding of 2 cm region of extrahepatic hypoattenuation raising concern for possibility of phlegmon or abscess which is new as compared with October 14.  It is unclear whether this radiographic abnormality is related to previous ablation of hepatic metastasis or more recent laparoscopic cholecystectomy.  Clinically, active infection is not suspected nor is he having worsening abdominal pain.  -Continue clinical monitoring  -If he were to develop fever or other signs of SIRS or worsening abdominal symptoms, would consider additional diagnostic evaluation of this radiographic abnormality  -Anticipate outpatient follow-up with oncology team at [facility] in November/December as previously planned    Elevated alkaline phosphatase  Serum alkaline phosphatase has been consistently elevated during this hospitalization and is higher than it was at hospital discharge on October  19 after his cholecystectomy and ERCP with sphincterotomy and biliary stent placement.  Other LFTs have been stable.  Aside from an area of focal biliary dilation on CT distal to one of the hepatic metastatic lesions, other biliary obstruction has not been suspected during this hospitalization.  Biliary infection has not been suspected so far.  -Monitor LFTs including alkaline phosphatase periodically    Abnormal chest CT  Chest CT this hospitalization demonstrates opacity in the right lower lobe consistent with possible pneumonitis.  Clinically, pneumonia is not suspected at this time.  Recent aspiration is possible.  He is not having significant respiratory symptoms at this time.  -Continue clinical monitoring  -If he develops fever or worsening respiratory symptoms, consider additional evaluation and/or treatment for pneumonia including aspiration pneumonia    Enterococcus bacteremia 9/27/23   Acute cholecystitis and gallstone pancreatitis related to choledocholithiasis S/P ERCP, sphincterotomy with stent placement, and lap cholecystectomy 10/15/23-10/18/23  Patient hospitalized from 10/7/2023 through 10/10/2023 and again 10/15/2023 through 10/19/2023 due to symptomatic choledocholithiasis with gallstone pancreatitis and acute cholecystitis for which she had invasive intervention including sphincterotomy with common bile duct stent placement and laparoscopic cholecystectomy.  He had completed a prolonged course of antibiotic treatment with recent blood cultures negative.  He has not had fever or other signs of infection during this hospitalization.  Blood cultures obtained 10/29 are pending and he is not currently receiving antibiotics.  -Continue clinical monitoring, reconsider empiric antibiotic therapy if there is increasing concern for infection    Coronary atherosclerosis Status post insertion of drug-eluting stent into left anterior descending artery for coronary artery disease  Previous history of CAD and  coronary artery stent without any recent symptoms or signs signs concerning for angina.  Patient chronically treated with on metoprolol XL 25 mg daily and Crestor 40 mg daily but not aspirin.    -Holding metoprolol for now due to low normal blood pressures and poor oral intake but anticipate restarting it as blood pressure trends upward  -Continue chronic dose of Crestor    Hypertension goal BP (blood pressure) < 140/90  Chronic hypertension is normally treated with metoprolol XL 25 mg daily which has been held since admission due to low normal blood pressures and poor oral intake.  He has been normotensive.  Heart rate has trended upward.  -Continue to hold metoprolol today    Chronic kidney disease, stage 3  Baseline creatinine of 1.1-1.4 concerning for stage III chronic kidney disease.  Renal function is stable compared with this baseline.  -ordered recheck of renal function    Hypothyroidism due to acquired atrophy of thyroid  Chronic hypothyroidism appears stable on chronic dose of levothyroxine 100 mcg daily although it is possible that hyperthyroidism contributes to diarrhea.  Thyroid function test this hospitalization are essentially normal with borderline low free T4 in the context of inability to reliably tolerate oral medications due to GI symptoms.  -Continue chronic dose of levothyroxine for now and reassess based on thyroid function test results ordered today    Anxiety  Chronic anxiety has been stable on chronic dose of Celexa   -Continue chronic dose of Celexa    Hyperlipidemia LDL goal <130  Chronic hyperlipidemia normally treated with Crestor 40 mg daily  -Continue chronic dose of Crestor    HOME TODAY -  K 3. 5  Phos and K rich foods print out  Check BMP next week with PCP  Print out all results and keep in binder -    Patient has lots of consultants in different systems  Dectease Bicarb to 650 mg QID    FULL discontinue summary to dr Guillaume          Diet: Pureed Diet (level 4) Mildly Thick (level  "2)  Snacks/Supplements Adult: Other; Mildly Thick Ensure enlive; With Meals  Diet    DVT Prophylaxis: Low Risk/Ambulatory with no VTE prophylaxis indicated  Stone Catheter: Not present  Lines: None     Cardiac Monitoring: None  Code Status: Full Code      Clinically Significant Risk Factors        # Hypokalemia: Lowest K = 3 mmol/L in last 2 days, will replace as needed       # Hypoalbuminemia: Lowest albumin = 2.4 g/dL at 10/30/2023  6:18 AM, will monitor as appropriate     # Hypertension: Noted on problem list        # Overweight: Estimated body mass index is 25.56 kg/m  as calculated from the following:    Height as of this encounter: 1.727 m (5' 8\").    Weight as of this encounter: 76.2 kg (168 lb 1.6 oz).      # Financial/Environmental Concerns: none         Disposition Plan      Expected Discharge Date: 11/04/2023,  9:00 AM  Discharge Delays: Lab Result Pending (enter specific test & time in comments)  Destination: home with family  Discharge Comments: adjusting medications and rechecking electrolytes            China Mcdaniel MD  Hospitalist Service  [facility]  Securely message with [employer] (more info)  Text page via [employer] Paging/Directory   ______________________________________________________________________    Interval History   K 3 5 today  Soft loose stools small quantitye x 3 during night,NOT dudring day  No emesisi    Physical Exam   Vital Signs: Temp: 98.7  F (37.1  C) Temp src: Oral BP: 121/76 Pulse: 84   Resp: 17 SpO2: 96 % O2 Device: None (Room air)    Weight: 168 lbs 1.6 oz    General Appearance: NAD, depressed flat affect  Respiratory: RRR no mmm  Cardiovascular: RRR no mm  GI: soft  Skin: neg  Other: neg     Medical Decision Making       20 MINUTES SPENT BY ME on the date of service doing chart review, history, exam, documentation & further activities per the note.      Data     I have personally reviewed the following data over the past 24 hrs:    N/A  \   " N/A   / 229     137 106 9.6 /  95   3.5 19 (L) 1.15 \

## 2023-11-06 NOTE — Clinical Note
"Dear Dr. Rivers, We completed a post hospital follow up call and patient has a pending post hospital follow up appointment with you tomorrow. Of note, patient cannot take potassium & sodium phosphates - packet due to \"bitterness that burns\" and patient cannot tolerate. Wife is attempting to give and will attempt to give going forward.  Wife did declined CC program again.  Thank you, Aleja Vanegas RN, BSN, PHN Care Coordinator [place], [place], and [place] Castaneda  Phone: [phone]  (Covering for Rossana BELTRAN RN) "

## 2023-11-06 NOTE — PROGRESS NOTES
"Clinic Care Coordination Contact  [facility]: Post-Discharge Note  SITUATION                                                      Admission:    Admission Date: 10/28/23   Reason for Admission: swallowing difficulty  Discharge:   Discharge Date: 11/04/23  Discharge Diagnosis: Inadequate oral nutritional intake  Nausea and vomiting  Moderate oropharyngeal dysphagia  Hypoalbuminemia  History of inadequate PO intake requiring G-tube feeding in 2021 following chemoradiation treatment of squamous cell carcinoma of the left tonsil    BACKGROUND                                                      Per hospital discharge summary and inpatient provider notes:  Patient is a 73-year-old gentleman with a past medical history of 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     ASSESSMENT       Discharge Assessment  How are you doing now that you are home?: CC RN contacted patient, introduced self, role, care coordination program and reason for call.  Patient reports feeling the same, and is working on his \"nutrition.\" Patient then handed phone to wife, Alessandra whom is on pts signed consent to communicate. Alessandra talked to a pharmacist this morning to report that patient cannot take potassium & sodium phosphates - packet due to \"bitterness that burns\" and patient cannot tolerate. CC RN offered MTM referral or CC program enrollment, wife declined as she stated she is a retired RN and felt " confident to advocate for patients needs. Patients post hospital follow up appointment is >7 days from discharge, CC RN warm transferred to priority post hospital follow up scheduling line to inquire of PCP availability, or send a work in message to team. Wife agreeable.  How are your symptoms? (Red Flag symptoms escalate to triage hotline per guidelines): Unchanged  Do you feel your condition is stable enough to be safe at home until your provider visit?: Yes  Does the patient have their discharge instructions? : Yes  Does the patient have questions regarding their discharge instructions? : No  Were you started on any new medications or were there changes to any of your previous medications? : Yes  Does the patient have all of their medications?: Yes  Do you have questions regarding any of your medications? : Yes (see comment) (will route encounter to PCP as FYI)  Discharge follow-up appointment scheduled within 14 calendar days? : Yes  Discharge Follow Up Appointment Date: 11/07/23  Discharge Follow Up Appointment Scheduled with?: Primary Care Provider         Post-op (Clinicians Only)  Did the patient have surgery or a procedure: No  Fever: No  Chills: No  Eating & Drinking: eating and drinking without complaints/concerns (per IP AVS: Follow this diet upon discharge:  Snacks/Supplements Adult: Mildly Thick Ensure enlive;  With Meals  Pureed Diet (level 4) Mildly Thick (level 2))  Bowel Function: loose stools  Date of last BM: 11/06/23  Urinary Status: voiding without complaint/concerns    PLAN                                                      Outpatient Plan:  1. Wife has copy of IP AVS and will follow recommendations.   2. Wife declined Care Coordination program at this time. Patient may be referred to Care Coordination in the future if agreeable.   3. Patient/wife encouraged to contact the care team if situation changes and assistance is needed.   4. FYI to PCP:patient cannot take potassium & sodium  "phosphates - packet due to \"bitterness that burns\" and patient cannot tolerate.      Future Appointments   Date Time Provider Department Center   11/7/2023  1:40 PM Monico Rivers MD [facility]         For any urgent concerns, please contact our 24 hour nurse triage line: [phone] ([phone])         Aleja Vanegas, RN  (Covering for Rossana BELTRAN RN)                  "

## 2023-11-06 NOTE — DISCHARGE SUMMARY
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       Inadequate oral nutritional intake  Nausea and vomiting  Moderate oropharyngeal dysphagia  Hypoalbuminemia  History of inadequate PO intake requiring G-tube feeding in 2021 following chemoradiation treatment of squamous cell carcinoma of the left tonsil  He had no recent swallowing problems prior to his liver ablation in September 2023 but started noticing some difficulty in swallowing solids and larger pills prior to his recent hospitalizations earlier this month.  Since hospital discharge on 10/19/2023, he had progressively increasing swallowing difficulty and has either not been able to swallow solid foods or large pills or has had to expectorate them shortly after trying to swallow because they get stuck in his upper throat.  As a consequence, his oral nutritional intake has been inadequate.  No gross causative abnormality noted on exam or radiographically after CT scan of the neck and chest.  Oral intake started to significantly improve as of 10/30.  Speech-language  pathology evaluation on 10/31 and video swallow on 11/2 were significantly abnormal concerning for moderate oropharyngeal dysphagia for which modified diet with mildly thickened liquids and puréed solids has been advised.  Throughout hospitalization he has been troubled by recurring nausea and vomiting such that inadequate oral intake persists, but nausea does appear to have improved over the last 24 hours although oral intake remains very limited.  Serum albumin was low at admission and continues to be low likely due to inadequate nutritional intake.    -Continue puréed solids with mildly thickened liquids   -Nursing may crush pills and or dissolve in liquid if needed  -Registered dietitian assisting with recommendations for optimizing nutritional status  -Monitor serum albumin as indicated  -Continue scopolamine patch due to refractory nausea  -Continue Zofran and add Compazine as needed for nausea    Resolved fro more than 24 hours prior to discharge     Metabolic acidosis with normal anion gap  Diarrhea, suspect ileus  Patient was admitted with metabolic acidosis and normal anion gap that also appears to have been present during his previous hospitalization.  He has intermittent compensatory respiratory alkalosis.  He has persistent diarrhea which he says began after his cholecystectomy raising suspicion for postcholecystectomy diarrheal syndrome.  Suspect metabolic acidosis is due to stool bicarbonate losses from diarrhea.  Metabolic acidosis may be causing ileus that in turn is causing recurring nausea and vomiting and persistent diarrhea.  Stool testing for Giardia was negative and other enteric infection including clostridia difficile has not been suspected.  Diarrhea is now improving after starting scheduled doses of Imodium.  Hyperchloremia after previous IV fluid treatment with chloride and frequent potassium chloride supplementation may have exacerbated metabolic acidosis, but hyperchloremia has resolved  after transition to other forms of potassium supplementation.  -Continue oral bicarbonate supplementation 4 times daily  -Ordered recheck of electrolytes and blood gases  -Continue scheduled dose of Imodium twice daily but may need to decrease dosing as diarrhea resolves because of his risk of constipation  -Minimizing chloride administration as able     Ecreased dose of bicarbonate to 650 mg QID -loose stool quantitiy and frequency much improved   Lower dose bicarb can be used in RTA.    Hypokalemia  Hypophosphatemia  He had hypokalemia during hospitalization on 10/7/2023 that was attributed to poor oral intake, nausea, and diarrhea.  Patient discharged from the last 2 hospitalizations in October with potassium supplementation at home which the patient has not tolerated due to inability to swallow the large pill.  Admitted with potassium 2.2 and potassium has intermittently normalized after additional potassium supplementation, but hypokalemia continues to recur probably because of inadequate oral intake, ongoing stool losses, intermittent patient refusal of oral potassium supplementation, and metabolic acidosis causing electrolyte shifts.  Ongoing and recurring hypokalemia continues to prolong his hospitalization.  Hyperaldosteronism is a consideration but not strongly suspected.  There is concern that recurring hypokalemia exacerbates ileus.  Noted to have hypophosphatemia likely due to inadequate oral nutritional intake and which could contribute to weakness and/or ileus.  -Not using standard potassium supplementation protocol because protocol includes treatment with potassium chloride tablets and he has had difficulty tolerating the tablets due to swallowing problems  -Ordered potassium chloride powder 40 mEq 3 times a day which he has been able to tolerate with swallowing but may need to de-escalate and/or stop potassium chloride if he develops recurrent hyperchloremia  -Continue potassium phosphorus  supplementation but increase to 2 packets 3 times a day  -Ordered recheck of potassium in a.m.  -Continue phosphorus supplementation per standard protocol  -Consider adding spironolactone for renal potassium sparing effect if hypokalemia continues to recur despite escalating potassium supplementation     Have elected to forgo additional spironolacton to decrease pill burden.  List with K and Phos rich foods has been provided     Anemia, unspecified type  Mildly anemic with hemoglobin 9-10, similar to hemoglobin at time of recent hospital discharge on October 19.  Active bleeding is not suspected and stool testing for occult blood negative during this hospital stay.  -Monitor hemoglobin periodically  -multifactorial including repeat blooddraws,, illness. No need urgent work up     Squamous cell carcinoma of left tonsil   Metastatic cancer to liver s/p ablation of liver metastasis on 9/19/23  Abnormal abdomen CT with area of extrahepatic hypoattenuation  He is status post chemoradiation of left tonsillar  mass in 2021.  At that time, he had malnutrition from inability to swallow safely and required G-tube feeding for period of time along with speech therapy evaluation and strengthening.  Initial mass responded well to treatment however since that time patient has had intermittent metastatic disease show up in his neck, in the rib, and most recently in the liver.  All have responded well to medical management.  He underwent ablation of liver metastasis at [facility] on 9/19/2023.  On abdomen CT during this hospital stay, he was found to have signs of previous ablation and liver mets but finding of 2 cm region of extrahepatic hypoattenuation raising concern for possibility of phlegmon or abscess which is new as compared with October 14.  It is unclear whether this radiographic abnormality is related to previous ablation of hepatic metastasis or more recent laparoscopic cholecystectomy.  Clinically, active infection is  not suspected nor is he having worsening abdominal pain.  -Continue clinical monitoring  -If he were to develop fever or other signs of SIRS or worsening abdominal symptoms, would consider additional diagnostic evaluation of this radiographic abnormality  -Anticipate outpatient follow-up with oncology team at [facility] in November/December as previously planned     Elevated alkaline phosphatase  Serum alkaline phosphatase has been consistently elevated during this hospitalization and is higher than it was at hospital discharge on October 19 after his cholecystectomy and ERCP with sphincterotomy and biliary stent placement.  Other LFTs have been stable.  Aside from an area of focal biliary dilation on CT distal to one of the hepatic metastatic lesions, other biliary obstruction has not been suspected during this hospitalization.  Biliary infection has not been suspected so far.  -Monitor LFTs including alkaline phosphatase periodically  - Follow-up with his [facility] providers as mentioned above     Abnormal chest CT  Chest CT this hospitalization demonstrates opacity in the right lower lobe consistent with possible pneumonitis.  Clinically, pneumonia is not suspected at this time.  Recent aspiration is possible.  He is not having significant respiratory symptoms at this time.  -Continue clinical monitoring  -If he develops fever or worsening respiratory symptoms, consider additional evaluation and/or treatment for pneumonia including aspiration pneumonia     Enterococcus bacteremia 9/27/23   Acute cholecystitis and gallstone pancreatitis related to choledocholithiasis S/P ERCP, sphincterotomy with stent placement, and lap cholecystectomy 10/15/23-10/18/23  Patient hospitalized from 10/7/2023 through 10/10/2023 and again 10/15/2023 through 10/19/2023 due to symptomatic choledocholithiasis with gallstone pancreatitis and acute cholecystitis for which she had invasive intervention including sphincterotomy with  common bile duct stent placement and laparoscopic cholecystectomy.  He had completed a prolonged course of antibiotic treatment with recent blood cultures negative.  He has not had fever or other signs of infection during this hospitalization.  Blood cultures obtained 10/29 are pending and he is not currently receiving antibiotics.  -Continue clinical monitoring, reconsider empiric antibiotic therapy if there is increasing concern for infection     Coronary atherosclerosis Status post insertion of drug-eluting stent into left anterior descending artery for coronary artery disease  Previous history of CAD and coronary artery stent without any recent symptoms or signs signs concerning for angina.  Patient chronically treated with on metoprolol XL 25 mg daily and Crestor 40 mg daily but not aspirin.    -Holding metoprolol for now due to low normal blood pressures and poor oral intake but anticipate restarting it as blood pressure trends upward  -Continue chronic dose of Crestor     Hypertension goal BP (blood pressure) < 140/90  Chronic hypertension is normally treated with metoprolol XL 25 mg daily which has been held since admission due to low normal blood pressures and poor oral intake.  He has been normotensive.  Heart rate has trended upward.  -Continue to hold metoprolol today     Chronic kidney disease, stage 3  Baseline creatinine of 1.1-1.4 concerning for stage III chronic kidney disease.  Renal function is stable compared with this baseline.  -ordered recheck of renal function     Hypothyroidism due to acquired atrophy of thyroid  Chronic hypothyroidism appears stable on chronic dose of levothyroxine 100 mcg daily although it is possible that hyperthyroidism contributes to diarrhea.  Thyroid function test this hospitalization are essentially normal with borderline low free T4 in the context of inability to reliably tolerate oral medications due to GI symptoms.  -Continue chronic dose of levothyroxine for now  "and reassess based on thyroid function test results ordered today     Anxiety  Chronic anxiety has been stable on chronic dose of Celexa   -Continue chronic dose of Celexa     Hyperlipidemia LDL goal <130  Chronic hyperlipidemia normally treated with Crestor 40 mg daily  -Continue chronic dose of Crestor     HOME TODAY -  K 3. 5  Phos and K rich foods print out  Check BMP next week with PCP  Print out all results and keep in binder -               Patient has lots of consultants in different systems  Dectease Bicarb to 650 mg QID     For details refer to Dr Guillaume's daily notes           Diet: Pureed Diet (level 4) Mildly Thick (level 2)  Snacks/Supplements Adult: Other; Mildly Thick Ensure enlive; With Meals  Diet    DVT Prophylaxis: Low Risk/Ambulatory with no VTE prophylaxis indicated  Stone Catheter: Not present  Lines: None     Cardiac Monitoring: None  Code Status: Full Code          Clinically Significant Risk Factors          # Hypokalemia: Lowest K = 3 mmol/L in last 2 days, will replace as needed       # Hypoalbuminemia: Lowest albumin = 2.4 g/dL at 10/30/2023  6:18 AM, will monitor as appropriate     # Hypertension: Noted on problem list        # Overweight: Estimated body mass index is 25.56 kg/m  as calculated from the following:    Height as of this encounter: 1.727 m (5' 8\").    Weight as of this encounter: 76.2 kg (168 lb 1.6 oz).      # Financial/Environmental Concerns: none               Clinically Significant Risk Factors     # Overweight: Estimated body mass index is 25.56 kg/m  as calculated from the following:    Height as of this encounter: 1.727 m (5' 8\").    Weight as of this encounter: 76.2 kg (168 lb 1.6 oz).       Follow-ups Needed After Discharge     Unresulted Labs Ordered in the Past 30 Days of this Admission       No orders found from 9/28/2023 to 10/29/2023.        These results will be followed up by PCP    Discharge Disposition   Discharged to home  Condition at discharge: " Stable    Hospital Course   See above    Consultations This Hospital Stay   CARE MANAGEMENT / SOCIAL WORK IP CONSULT  NUTRITION SERVICES ADULT IP CONSULT  SPEECH LANGUAGE PATH ADULT IP CONSULT    Code Status   Prior    Time Spent on this Encounter   I, China Mcdaniel MD, personally saw the patient today and spent greater than 30 minutes discharging this patient.       China Mcdaniel MD  [facility] 2A MEDICAL SURGICAL  [address] [place] [address]  Phone: [phone]  ______________________________________________________________________    Physical Exam   Vital Signs:                    Weight: 168 lbs 1.6 oz  General Appearance: Chronically ill  Respiratory: CTA  Cardiovascular: RR  GI: deferrec  Skin: neg  Other: Flat affect        Primary Care Physician   Monico Rivers    Discharge Orders      Primary Care - Care Coordination Referral      Reason for your hospital stay    Low potassium     Follow-up and recommended labs and tests     Follow up with primary care provider, Monico Rivers, within 7 days to evaluate medication change and for hospital follow- up.  The following labs/tests are recommended: BMP and phos next week. To follow K and chloride, renal functon and  phos.     Activity    Your activity upon discharge: activity as tolerated     Diet    Follow this diet upon discharge: Orders Placed This Encounter      Snacks/Supplements Adult: Other; Mildly Thick Ensure enlive; With Meals      Pureed Diet (level 4) Mildly Thick (level 2)       Significant Results and Procedures   Most Recent 3 CBC's:  Recent Labs   Lab Test 11/04/23  0608 10/31/23  0617 10/30/23  0618 10/29/23  0528 10/28/23  1817   WBC  --   --  7.8 10.1 8.7   HGB  --  10.7* 9.9* 11.2* 12.2*   MCV  --   --  86 87 82     --  197 223 243     Most Recent 3 BMP's:  Recent Labs   Lab Test 11/04/23  0608 11/03/23  1753 11/03/23  1221 11/03/23  0557 11/02/23  1006 11/02/23  0516     --   135 136   < > 137   POTASSIUM 3.5 3.1* 3.0* 3.1*   < > 3.2*   CHLORIDE 106  --  106 107   < > 110*   CO2 19*  --  18* 18*   < > 13*   BUN 9.6  --   --  9.2  --  8.2   CR 1.15  --   --  1.04  --  1.01   ANIONGAP 12  --  11 11   < > 14   LATRELL 9.0  --   --  9.0  --  8.9   GLC 95  --   --  87  --  117*    < > = values in this interval not displayed.       Discharge Medications   Discharge Medication List as of 11/4/2023 10:20 AM        START taking these medications    Details   artificial saliva (BIOTENE MT) SOLN solution Swish and spit 2 mLs (2 sprays) in mouth as needed for dry mouth, Disp-240 mL, R-0, E-Prescribe      potassium & sodium phosphates (NEUTRA-PHOS) 280-160-250 MG Packet Take 2 packets by mouth 3 times daily for 30 days, Disp-30 packet, R-0, E-Prescribe      potassium chloride (KLOR-CON) 20 MEQ packet Take 40 mEq by mouth 3 times daily for 30 days, Disp-180 packet, R-0, E-Prescribe      sodium bicarbonate 650 MG tablet Take 1 tablet (650 mg) by mouth 4 times daily for 30 days, Disp-120 tablet, R-0, E-Prescribe           CONTINUE these medications which have NOT CHANGED    Details   acetaminophen (TYLENOL) 325 MG tablet Take 1-2 tablets (325-650 mg) by mouth every 6 hours as needed for mild pain, Disp-40 tablet, R-0, E-Prescribe      citalopram (CELEXA) 40 MG tablet TAKE 1 TABLET BY MOUTH ONCE DAILY, Disp-90 tablet, R-0, E-Prescribe      DULoxetine (CYMBALTA) 60 MG capsule Take 1 capsule (60 mg) by mouth daily, Disp-90 capsule, R-3, E-Prescribe      famotidine (PEPCID) 20 MG tablet Take 20 mg by mouth 2 times daily as needed (heartburn), Historical      levothyroxine (SYNTHROID/LEVOTHROID) 100 MCG tablet Take 100 mcg by mouth daily, Historical      metoprolol succinate ER (TOPROL XL) 50 MG 24 hr tablet Take 0.5 tablets (25 mg) by mouth daily, Disp-90 tablet, R-3, E-Prescribe      omeprazole (PRILOSEC) 40 MG DR capsule Take 1 capsule (40 mg) by mouth daily as needed (heartburn), No Print Out       ondansetron (ZOFRAN ODT) 4 MG ODT tab DISSOLVE 1 TABLET (4 MG) BY MOUTH EVERY 8 HOURS AS NEEDED FOR NAUSEA, Disp-10 tablet, R-0, E-Prescribe      ondansetron (ZOFRAN) 4 MG tablet Take 1 tablet (4 mg) by mouth every 8 hours as needed for nausea, Disp-21 tablet, R-0, E-Prescribe      oxyCODONE (ROXICODONE) 5 MG tablet TAKE 1-2 TABLETS (5-10 MG) BY MOUTH EVERY 6 HOURS AS NEEDED FOR SEVERE PAIN (LIMIT TO 6 TABLETS PER DAY), Disp-120 tablet, R-0, E-Prescribe      pregabalin (LYRICA) 150 MG capsule Take 1 capsule (150 mg) by mouth daily, Disp-120 capsule, R-1, No Print Out      rosuvastatin (CRESTOR) 40 MG tablet Take 1 tablet (40 mg) by mouth daily, Disp-90 tablet, R-3, E-Prescribe      zolpidem (AMBIEN) 5 MG tablet TAKE 1 TABLET (5 MG) BY MOUTH NIGHTLY AS NEEDED FOR SLEEP, Disp-30 tablet, R-0, E-Prescribe      cetirizine (ZYRTEC) 10 MG tablet Take 1 tablet (10 mg) by mouth daily, Disp-30 tablet, R-1, E-Prescribe      naloxone (NARCAN) 4 MG/0.1ML nasal spray Spray 1 spray (4 mg) into one nostril alternating nostrils as needed for opioid reversal every 2-3 minutes until assistance arrives, Disp-0.2 mL, R-0, E-Prescribe      nitroGLYcerin (NITROSTAT) 0.4 MG sublingual tablet For chest pain place 1 tablet under the tongue every 5 minutes for 3 doses. If symptoms persist 5 minutes after 1st dose call 911., Disp-25 tablet, R-0, E-Prescribe           STOP taking these medications       potassium chloride ER (K-TAB) 20 MEQ CR tablet Comments:   Reason for Stopping:         sennosides (SENOKOT) 8.6 MG tablet Comments:   Reason for Stopping:             Allergies   Allergies   Allergen Reactions    Animal Dander     Azithromycin Nausea and Vomiting    Dust Mites     Pollen Extract     Smoke.

## 2023-11-06 NOTE — TELEPHONE ENCOUNTER
Reason for Call:  Appointment Request    Patient requesting this type of appt:  Hospital/ED Follow-Up     Requested provider: Monico Rivers    Reason patient unable to be scheduled: Not within requested timeframe    When does patient want to be seen/preferred time:  one week after discharge     Comments: HOSP FU-[facility] - LOW POTASSIUM AND WEAKNESS-10/28/23 TO 11/04/23     Could we send this information to you in [employer] or would you prefer to receive a phone call?:   Patient would prefer a phone call   Okay to leave a detailed message?: Yes at Cell number on file:    Telephone Information:   Mobile [phone]       Call taken on 11/6/2023 at 10:16 AM by Maribell SAWYER

## 2023-11-07 NOTE — PROGRESS NOTES
Assessment & Plan     New was seen today for hospital f/u.    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  Most recent liver ablation without chemo now.  Lengthy discussion today regarding long-term plan and potential follow-up with palliative care which she has seen through the [facility] in the past and does not want to do again.  Potential benefits discussed.  They will think about this but are not wanting further visits at this time.  Also discussed need for adequate nutrition intake and potential of need for tube feeds long-term if unable to keep up his nutrition.  This is not something he is interested in further goals of care will be discussed with him and the family.  He will follow-up with GI.  Follow-up with me in 2 weeks.  Continues on oxycodone which I recommend he limit to the 6 tablets daily but should have goal of less than this.  Again risk of chronic opioid use and potential side effects including risk of death respiratory suppression and addiction discussed.  He is very worried about not getting his pain medication given his ongoing issues with his throat.  Has been on higher doses in the past.  Has been able to take his pain medicines but avoiding some others as he does not like swallowing them.  Importance of taking things consistently discussed.  Follow-up sooner if new or worsening issues arise    Malnutrition   Oropharyngeal dysphagia  Patient with previous visit with speech therapy and swallow study.  Thickened liquids recommended given dysphagia.  Risk of aspiration with thin liquids discussed.  Currently doing boost and Ensure and trying to do oral intake which she thinks has been better recently but still struggles with this.  Importance of adequate caloric intake discussed.   Would like him to follow-up with dietitian.  -     Cancel: Adult GI  Referral - Consult Only; Future  -     Adult GI  Referral - Consult Only; Future      Hypertension goal BP (blood pressure) < 140/90  Stable    Atherosclerosis of native coronary artery of native heart without angina pectoris    Nausea  Does have Zofran available but scopolamine was the most helpful for him.  Symptoms previous good at this time.  We will have him restart scopolamine patch as needed.  Recommend he start taking omeprazole consistently.  -     scopolamine (TRANSDERM) 1 MG/3DAYS 72 hr patch; Place 1 patch onto the skin every 72 hours  -     Phosphorus; Future  -     Phosphorus    Hypothyroidism due to acquired atrophy of thyroid  Has not been taking consistently and potentially decreased in the setting of acute illness.  We will have him start taking regularly now and repeat in 4 weeks.  Consider increasing his dose if things remain low.  -     TSH with free T4 reflex; Future  -     **TSH with free T4 reflex FUTURE 2mo  -     T4 free    Hypophosphatemia  Hypomagnesemia  Hypokalemia  Metabolic acidosis, normal anion gap (NAG)  Multifactorial in the setting of his chronic disease as well as continued diarrhea since cholecystectomy as well as decreased oral intake due to discomfort and dysphagia.  Potassium stable now with replacement.  I suspect improvement with improved diet and decrease in diarrhea.  Continue sodium bicarb at this time and potassium replacement.  We will repeat labs in 1 to 2 weeks with follow-up.  -     Comprehensive metabolic panel (BMP + Alb, Alk Phos, ALT, AST, Total. Bili, TP)    Bile salt-induced diarrhea  Gallstone pancreatitis  Suspect related to his previous cholecystectomy.  Negative infectious work-up in the past and given relation to his cholecystectomy this is likely the cause.  Is to start Imodium now but did best with cholestyramine in the hospital which we will try.  Can continue  "Imodium as they have not given this a good try now.  As far as I can tell he does have stent in place and will need this removed.  No pain at this time.  Plan for follow-up with GI.  -     Cancel: Adult GI  Referral - Consult Only; Future  -     Adult GI  Referral - Consult Only; Future    Chronic maxillary sinusitis    Malignant neoplasm of prostate (H)    Anxiety  Continue citalopram    Other orders  -     REVIEW OF HEALTH MAINTENANCE PROTOCOL ORDERS          MED REC REQUIRED  Post Medication Reconciliation Status:  Discharge medications reconciled and changed, see notes/orders      MD THANH Guzman [facility]    Claribel Wolff is a 73 year old, presenting for the following health issues:  Hospital F/U        11/7/2023     1:38 PM   Additional Questions   Roomed by Gretta Hernandez   Accompanied by Wife-Mounika       HPI         11/6/2023     9:51 AM   Post Discharge Outreach   Admission Date 10/28/2023   Reason for Admission swallowing difficulty   Discharge Date 11/4/2023   Discharge Diagnosis Inadequate oral nutritional intake  Nausea and vomiting  Moderate oropharyngeal dysphagia  Hypoalbuminemia  History of inadequate PO intake requiring G-tube feeding in 2021 following chemoradiation treatment of squamous cell carcinoma of the left tonsil   How are you doing now that you are home? CC RN contacted patient, introduced self, role, care coordination program and reason for call.  Patient reports feeling the same, and is working on his \"nutrition.\" Patient then handed phone to wife, Alessandra whom is on pts signed consent to communicate. Alessandra talked to a pharmacist this morning to report that patient cannot take potassium & sodium phosphates - packet due to \"bitterness that burns\" and patient cannot tolerate. CC RN offered MTM referral or CC program enrollment, wife declined as she stated she is a retired RN and felt confident to advocate for patients needs. Patients " post hospital follow up appointment is >7 days from discharge, CC RN warm transferred to priority post hospital follow up scheduling line to inquire of PCP availability, or send a work in message to team. Wife agreeable.   How are your symptoms? (Red Flag symptoms escalate to triage hotline per guidelines) Unchanged   Do you feel your condition is stable enough to be safe at home until your provider visit? Yes   Does the patient have their discharge instructions?  Yes   Does the patient have questions regarding their discharge instructions?  No   Were you started on any new medications or were there changes to any of your previous medications?  Yes   Does the patient have all of their medications? Yes   Do you have questions regarding any of your medications?  Yes (see comment)   Discharge follow-up appointment scheduled within 14 calendar days?  Yes   Discharge Follow Up Appointment Date 11/7/2023   Discharge Follow Up Appointment Scheduled with? Primary Care Provider     Hospital Follow-up Visit:    Hospital/Nursing Home/IP Rehab Facility: [facility]  Date of Admission: 10/28/23  Date of Discharge: 11/4/23  Reason(s) for Admission: Inadequate oral nutritional intake  N&V    Was your hospitalization related to COVID-19? No   Problems taking medications regularly:  None  Medication changes since discharge: None  Problems adhering to non-medication therapy:  None    Summary of hospitalization:  [facility] hospital discharge summary reviewed  Diagnostic Tests/Treatments reviewed.  Follow up needed: GI and oncology and dietitian and speech therapy  Other Healthcare Providers Involved in Patient s Care:         Specialist appointment - dietitian, GI, oncology , speech  Update since discharge: stable.     Hospital discharge summary reviewed and patient here with wife today.  They have struggled to keep taking medication consistently as home given his throat discomfort and dysphagia that was  "noted on recent study.  He does not like the thickened liquids.  Notes poor appetite.  No chest pain or respiratory symptoms.  No belly pain.  Continues to have ongoing diarrhea.  No blood in the stool or urine.    Plan of care communicated with patient and family               Review of Systems   Constitutional, HEENT, cardiovascular, pulmonary, GI, , musculoskeletal, neuro, skin, endocrine and psych systems are negative, except as otherwise noted.      Objective    /64   Pulse 88   Temp 97.1  F (36.2  C) (Temporal)   Resp 16   Ht 1.727 m (5' 8\")   Wt 79.4 kg (175 lb)   SpO2 99%   BMI 26.61 kg/m    Body mass index is 26.61 kg/m .  Physical Exam   GENERAL: alert and no distress  EYES: Eyes grossly normal to inspection, PERRL and conjunctivae and sclerae normal  HENT: nose and mouth without ulcers or lesions noted, mild erythema, no thrush present  NECK: mild right sided tenderness to palpation without asymmetrical mass  RESP: lungs clear to auscultation - no rales, rhonchi or wheezes  CV: regular rate and rhythm, normal S1 S2, trace peripheral edema and peripheral pulses strong  ABDOMEN: soft, nontender, no hepatosplenomegaly, no masses and bowel sounds normal  MS: no gross musculoskeletal defects noted, no edema  SKIN: no suspicious lesions or rashes  NEURO:  mentation intact and speech normal  PSYCH: mentation appears normal, affect normal/bright              "

## 2023-11-08 NOTE — TELEPHONE ENCOUNTER
Patient had a lap jennifer 10/18/23 PLB and wife is calling with questions about a stent.    Please call    Phone: [phone]   Message ok

## 2023-11-08 NOTE — TELEPHONE ENCOUNTER
Patient's wife calling to discuss stent and GI    She reports patient saw his PCP yesterday and they want him to see GI    Informed her that GI will also be the ones to handle the stent since they are the ones that placed it    Provided her with phone number to Dr Blood's office    She will call DENA Domínguez, RN-BSN

## 2023-11-08 NOTE — TELEPHONE ENCOUNTER
The scopolamine patch has been ordered, however, insurance denied it. Please follow up on how you would like to proceed with that.    Patient is requesting a prescription for magic mouthwash.     Patient is also wondering when he should have follow up labs and visits next.

## 2023-11-09 NOTE — TELEPHONE ENCOUNTER
Order for magic mouth wash placed. Was there a reason patch was denied? He could buy out of pocket or continue with zofran. Otherwise I can place PA if able.

## 2023-11-28 NOTE — PROGRESS NOTES
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, insomnia type  - zolpidem (AMBIEN) 10 MG tablet  Dispense: 30 tablet; Refill: 0  Nausea and vomiting, unspecified vomiting type  History of bacteremia-Enterococcus Sept 2023  S/P ERCP, sphincterotomy with stent placement, lap cholecystectomy 10/16-10/18/23  Status post insertion of drug-eluting stent into left anterior descending artery for coronary artery disease  Other chronic postprocedural pain  - pregabalin (LYRICA) 150 MG capsule  Dispense: 60 capsule; Refill: 0  Hyperlipidemia LDL goal <130  - rosuvastatin (CRESTOR) 40 MG tablet  Dispense: 90 tablet; Refill: 3  Anxiety  - citalopram (CELEXA) 40 MG tablet  Dispense: 90 tablet; Refill: 3    Patient's diarrhea now resolved and stop cholestyramine.  Significant improvement in appetite but still occasional nausea.  Using Zofran as needed but no longer using scopolamine patches due to coverage.  Will have upcoming procedure in January for stent removal.  Doing well without fevers or other infectious symptoms at this time.  Does note right intercostal pain at times with significantly deep breaths but no other abdominal pain or Dill's.  Lungs clear on auscultation.  Intercostal pain has been improving but does have point tenderness that is mild.  Upcoming appointment with [facility] on 6 December.  Continuing potassium supplementation will repeat level today as he may not need this given resolution of diarrhea and vomiting.  Taking  thyroid medication as prescribed now at 100 mcg and would repeat in 2 to 3 weeks.  Discussed trial of pilocarpine for xerostomia as he used in the past but given cost and potential for minimal improvement would discontinue this if he does not note improvement.  Other side effects discussed.  Discussed sleep hygiene in detail today.  Significance of medication interactions and potential sedation and risk for falls as well as memory impairment in the setting of polypharmacy discussed.  He struggles with lack of sleep and getting up often with 5 mg and would like to go to 10 mg as he was on in the past.  He did tolerate well then.  Was also on higher dose of Lyrica but transition to once daily upon discharge.  This is helpful for his chronic pain.  No confusion or side effects of medication.  Stools normal now in the setting of his chronic opiate use.  Has been on Celexa and Cymbalta which has controlled his anxiety and depression.  Potential interaction discussed.  Was on benzodiazepines in the past but discontinued due to interactions.  Alk phos still slightly elevated with stent in place.  Upcoming removal and follow-up with GI.  No signs of acute infection.         MED REC REQUIRED  Post Medication Reconciliation Status:  Discharge medications reconciled and changed, see notes/orders      Monico Rivers MD  [facility]    Claribel Wolff is a 73 year old, presenting for the following health issues:  Fatigue (Malnutrition)        11/28/2023     2:02 PM   Additional Questions   Roomed by Lauren BHATT       History of Present Illness       Back Pain:  He presents for follow up of back pain. Patient's back pain is a chronic problem.  Location of back pain:  Other  Description of back pain: other  Back pain spreads: nowhere    Since patient first noticed back pain, pain is: unchanged  Does back pain interfere with his job:  Not applicable       Mental Health Follow-up:  Patient presents to  "follow-up on Anxiety.    Patient's anxiety since last visit has been:  Medium  The patient is having other symptoms associated with anxiety.  Any significant life events: health concerns  Patient is not feeling anxious or having panic attacks.  Patient has no concerns about alcohol or drug use.    Hypothyroidism:     Since last visit, patient describes the following symptoms::  Anxiety and Depression    Reason for visit:  Follow up meds, labs, and ability to eat .He consumes 4 sweetened beverage(s) daily.He exercises with enough effort to increase his heart rate 9 or less minutes per day.  He exercises with enough effort to increase his heart rate 3 or less days per week.   He is taking medications regularly.     Intake much improved since previous visit.  Tolerating medications and taking potassium.  Diarrhea resolved.    Review of Systems   Constitutional:  Positive for fatigue.      Constitutional, HEENT, cardiovascular, pulmonary, GI, , musculoskeletal, neuro, skin, endocrine and psych systems are negative, except as otherwise noted.      Objective    /64   Pulse 100   Temp 97.6  F (36.4  C) (Temporal)   Resp 20   Ht 1.702 m (5' 7\")   Wt 80.9 kg (178 lb 6 oz)   SpO2 100%   BMI 27.94 kg/m    Body mass index is 27.94 kg/m .  Physical Exam   GENERAL: alert and no distress  EYES: Eyes grossly normal to inspection, PERRL and conjunctivae and sclerae normal  HENT:  nose and mouth without ulcers or lesions  NECK: no adenopathy,right sided discomfort to palpation without nodule noded.   RESP: lungs clear to auscultation - no rales, rhonchi or wheezes  CV: regular rate and rhythm, normal S1 S2, no S3 or S4, no murmur  ABDOMEN: soft, nontender, no hepatosplenomegaly, negative naqvi's, no masses and bowel sounds normal  MS: no gross musculoskeletal defects noted, no edema, right lateral intercostal tenderness to palpation and movement mild  SKIN: no suspicious lesions or rashes  NEURO: Normal strength and " tone, mentation intact and speech normal  PSYCH: mentation appears normal, affect normal/bright

## 2023-11-29 NOTE — TELEPHONE ENCOUNTER
[facility]  Post op call note - Laparoscopic Cholecystectomy    Quan Murphy was called for an update regarding his recovery.  He underwent surgery on 10/18/23.      Today he tells me he is doing well and has no complaints or concerns about diet, bowel function or wound healing.  He has no complaints about fever/chills, n/v/d, abdominal pain, changes in urination or BM, or wound issues.     Pathology:  Final Diagnosis   A(1). Gallbladder, cholecystectomy:  -Chronic cholecystitis, and cholelithiasis.  -One (1) adjacent lymph node negative for metastatic carcinoma (0/1).  -Negative for dysplasia or malignancy.     He may remove steri strips if they are still in place and keep the wounds clean.  He may advance his activity as tolerated but avoid heavy lifting initially.  The patient states all of his questions were answered and he understands our discussion.  He agrees to follow up as needed and to call our office with any concerns.    Karri Kumar PA-C  [phone]

## 2023-12-04 NOTE — TELEPHONE ENCOUNTER
Patient calling, hoping for refill today, he will be heading out of town to [facility] tomorrow.    Bri Oshea XRO/

## 2023-12-18 NOTE — TELEPHONE ENCOUNTER
Order/Referral Request    Who is requesting: Infectious Disease [facility]    Orders being requested: Antibiotics need to be changed    Infectious Diseases-Miscellaneous Note     Mr. Murphy has known history of oropharynx squamous cell carcinoma status post chemoradiation/chemotherapy and most recently ablation of liver lesion in 09/2023.  He has had persistent hepatic lesion/fluid collection at the ablation site. Recent PET CT (12/6) showed FDG avid lobulated soft tissue/fluid associated with the ablation site in the right hepatic lobe.  He was admitted for further workup.  US guided biopsy (12/12) produced 5 mL of purulent fluid and tissue samples.  Tissue culture had microbial growth and fluid culture was negative.   Pathology on the tissue samples was negative for malignancy but did show evidence of an organizing abscess with granulation tissue.  Drain could not be placed due to location. He was discharged to complete a 2 week course of Zosyn.  At that point, he would be reassess in ID clinic with repeat imaging.      Since being discharged from the hospital, the liver tissue culture grew Enterococcus Hirae which is resistant to Penicillin and intermediate to Daptomycin.  It is noted that he is on an SSRI which eliminates Linezolid as an option.  We recommend switching him to Vancomycin and Ceftriaxone.  He has received Vancomycin in the past but not Ceftriaxone.      He was recently on IV Vancomycin 1500 mg IV Q12H with a high trough level of 18.9.  We recommend starting with 1000 mg IV Q12H dosing.      He has a [facility] near his home and his wife is a retired RN.      RECOMMENDATIONS  Discontinue Zosyn  Start Ceftriaxone 2 G IV Q24H and Vancomycin 1000 mg IV Q12H   Continue OPAT lab monitoring, but including Alk phos and Vancomycin trough level.  Continue PICC site care weekly   ID follow up with repeat imaging in 2 weeks as scheduled     Treatment plan reviewed with Fortino  Jeffrey, who expressed understanding. All questions answered to patient's satisfaction.           AUREA Metcalf, DAISHA., M.S.         Electronically signed by AUREA Wooten P.A.-C., M.S. at 12/18/2023 10:08 AM           Reason service is needed/diagnosis: Cultures came back with correct susceptibilities    When are orders needed by: TODAY    Has this been discussed with Provider: Please place orders in Epic      Does patient have an appointment scheduled?: Yes: Called and patient will have labs done today and infusion is working on getting him scheduled for first initial dose of Ceftriaxone up in infusion either today or tomorrow per [facility] ID recommendations    UPDATE: Patient scheduled to get Ceftriaxone at infusion center at 8AM tomorrow morning, 12/19    Where to send orders: Place orders within Epic      Please place orders ASAP. PCP is not in office today    De Grace,MELLYN, RN

## 2023-12-19 NOTE — PROGRESS NOTES
[place] History and Physical    Girl Mimi Wylie is a 0 day old female 7 lb 12 oz (3515 g) infant, delivered at Gestational Age: 40w6d on 2018.    MATERNAL INFORMATION:     Age, /Para, CHARMAINE:   Information for the patient's mother:  Inessa Mimi MILADIS [[id number]]   33 year old         2019, by Last Menstrual Period      steroids during pregnancy: None     Information for the patient's mother:  LoreLuisJostinMimi hammond [[id number]]     Social History     Tobacco Use   • Smoking status: Former Smoker     Last attempt to quit: 2003     Years since quitting: 15.6   • Smokeless tobacco: Never Used   Substance Use Topics   • Alcohol use: No     Alcohol/week: 2.4 - 3.0 oz     Types: 4 - 5 Standard drinks or equivalent per week     Comment: none since pregnancy      Information for the patient's mother:  LoreLuisGlendaMimi [[id number]]     Past Medical History:   Diagnosis Date   • Anemia     with syncope    • Anxiety    • Anxiety disorder    • Depression    • Headache, chronic migraine without aura    • Insomnia    • Kidney stones    • Post-operative nausea and vomiting    • Syncope     has not happened since 27 yeras old        Prenatal Labs:  Information for the patient's mother:  Inessa Mimi REDDING [[id number]]     Recent Labs   Lab 18  1640 18  1620 18  1512   HIV Antigen/Antibody Screen  --  NONREACTIVE  --    HEP B Surface AG NEGATIVE  --   --    TREPONEMA PALLIDUM AB NONREACTIVE  --   --    Neisseria Gonorrhoeae by Nucleic Acid Amplification  --   --  NEGATIVE   Chlamydia Trachomatis by Nucleic Acid Amplification  --   --  NEGATIVE   Rubella Antibody IgG 36.9  --   --    ABO/RH(D) O POSITIVE  --   --      Information for the patient's mother:  PiyushMimi hammond [[id number]]     Recent Labs   Lab 19  1524   CULTURE NEGATIVE FOR STREPTOCOCCUS AGALACTIAE (STREP GROUP B)   Specimen Description VAGINA/RECTUM     GBS: Negative No antibiotics  Infusion Nursing Note:  Quan Murphy presents today for 1st Dose of Rocephin.    Patient seen by provider today: No   present during visit today: Not Applicable.    Note: Patient ambulated to IVO  with wife. VS taken. Afebrile Pain 4/10 in abdomen after taking pain med. Patient has PICC line. Plans to have home antibiotics -Vanco/Rocephin. Will return weekly for PICC Labs and dressing change.       Intravenous Access:  PICC.    Treatment Conditions:  Not Applicable.      Post Infusion Assessment:  Patient tolerated infusion without incident.  Patient observed for 30 minutes post ROCEPHIN per protocol.  Blood return noted pre and post infusion.  [facility] notified that patient tolerated dose.  Spoke with Yulia, she will get message to [facility]. They will notify patient and wife for delivery.      Discharge Plan:   Discharge instructions reviewed with: Patient.  Patient discharged in stable condition accompanied by: self and wife.  Departure Mode: Ambulatory.      Mirna Hernandez RN   needed.    BIRTH HISTORY:     Delivery Method: Vaginal, Spontaneous [250]   Rupture date & time: 2019 12:06 AM   Date & time of birth 2018 5:24 AM   Induction: None     Complications/ Risks None     Placenta appearance: Intact   Cord info/complications: 3 Vessels None       Delayed cord clamping: Yes   Indications for :     Presentation/position: Vertex Left Occiput Anterior   Forceps attempted? No     Vacuum attempted? No     Shoulder dystocia? No                          INFORMATION     Resuscitation:  Suctioning          APGARS  One minute Five minutes   Skin color: 0  1    Heart rate: 2  2     Reflex: 2  2    Muscle tone: 2  2    Breathin  2    Totals:   8  9      Cord Blood/Merna Evaluation (CRD)  Recent Labs   Lab 19  0524   ABR O POSITIVE   DATANTIIGG NEGATIVE     Blood gases sent? No   Cord pH No results found    PHYSICAL EXAM     VITALS:   Visit Vitals  Pulse 132   Temp 98 °F (36.7 °C)   Resp 32   Ht 21\" (53.3 cm) Comment: Filed from Delivery Summary   Wt 3.515 kg Comment: Filed from Delivery Summary   HC 34 cm (13.39\") Comment: Filed from Delivery Summary   BMI 12.36 kg/m²     Intake/Output        07 -  0659 700 -  0659          Stool Occurrence  1 x          Birth Measurements:        Weight: 7 lb 12 oz (3515 g)        Length: 21\"        Head circumference: 34 cm    GENERAL:Baby Girl is an alert, vigorous female with appropriate behavior. She is in no acute distress.  SKIN: Her skin is warm with normal turgor. The color of the skin is pink. There is no rash. There are no bruises or other signs of injury.  Significant jaundice is not present.  HEAD: The head is atraumatic and normocephalic. The anterior fontanel is open and flat.  EYES: The conjunctivae appear normal with neither icterus nor subconjunctival hemorrhage.   Red reflexes are seen bilaterally.  EARS: Pinnae normal.  NOSE: There is no nasal flaring, nares patent bilaterally.  THROAT:   The oropharynx is normal.  There is no cleft of the palate.  NECK: Clavicles without crepitus.  TRUNK AND THORAX: There are no lesions on the trunk; there is no dimple over the presacral area. There are no retractions.  LUNGS: The lung fields are clear to auscultation.  HEART: The precordium is quiet. The heart rhythm is grossly regular. S1 and S2 are normal. There are no murmurs. Normal femoral pulses.  ABDOMEN: The umbilical cord stump is normal. There is not an umbilical hernia. The abdomen is flat and soft.   GENITALIA: normal female genitalia  RECTAL: anus patent  EXTREMITIES: Moving all 4 extremities. The hip exam is normal  . There are no hip clicks or clunks.    NEUROLOGIC: She displays normal tone throughout. She is not jittery.    ASSESSMENT     Well 0 day old female infant.      PLAN     Routine  care.   is currently being fed Breast milk only.   I am aware that mother's feeding choice upon admission was the following:   Information for the patient's mother:  Mimi Wylie [[id number]]   Exclusive breast milk feeding  There are no medical contraindications to exclusive breast milk feeding, and the benefits of exclusively breastfeeding were discussed/reinforced with the mother.    Infant has a history of cardiac abnormalities noted in utero, and will be followed clinically and by cardiology when needed or as a scheduled outpatient.

## 2023-12-21 NOTE — PROGRESS NOTES
Infusion Nursing Note:  Quan Murphy presents today for PICC Labs and dressing change.    Patient seen by provider today: No   present during visit today: Not Applicable.    Note: Patient ambulated to IVO with wife. PICC tender at insertion site per patient. Slight pink. Dressing change completed, biopatch and securement device added prior to tegaderm. Patient states feels better. .      Intravenous Access:  Labs drawn without difficulty.  PICC.    Treatment Conditions:  Lab Results   Component Value Date    HGB 9.0 (L) 12/21/2023    WBC 6.6 12/21/2023    ANEU 5.5 07/02/2021    ANEUTAUTO 4.7 12/21/2023     12/21/2023        Lab Results   Component Value Date     11/28/2023    POTASSIUM 4.6 11/28/2023    MAG 1.6 (L) 11/07/2023    CR 0.76 12/21/2023    LATRELL 8.9 11/28/2023    BILITOTAL 0.3 11/28/2023    ALBUMIN 2.8 (L) 11/28/2023    ALT 23 12/21/2023    AST 52 (H) 11/28/2023       Lab results from today faxed to Infectious disease at [place].  [phone].      Post Infusion Assessment:  Blood return noted pre and post infusion.        Discharge Plan:   Discharge instructions reviewed with: Patient and Family.  Patient discharged in stable condition accompanied by: self and wife.  Departure Mode: Ambulatory. Return in 1 week.      Mirna Hernandez RN

## 2023-12-28 NOTE — PROGRESS NOTES
"Infusion Nursing Note:  Quan Murphy presents today for PICC labs/Drsg change .    Patient seen by provider today: No   present during visit today: Not Applicable.    Note: Patient's denies sx or concerns today, declines VS. See below for PICC note.      Intravenous Access:  PICC.  No blood return for lab draw today when flushed with NS and position changes.   Site WDL. Flushes easily, still no blood return.   Dr. Zamudio contacted to inquire if pt can get Alteplase to PICC line.  Alteplase ordered. Given and no blood return at 30\", positive blood return at 60 minutes. Labs drawn without difficulty at that time.   Saline locked, Site WDL.     Discharge Plan:   AVS to patient via MYCHART. Patient doing home IV abx with [facility], wife administering. Patient will return 1/4/24 for next appointment.   Patient discharged in stable condition accompanied by: wife.  Departure Mode: Ambulatory.      Manisha Chase RN  "

## 2024-01-01 ENCOUNTER — TELEPHONE (OUTPATIENT)
Dept: UROLOGY | Facility: CLINIC | Age: 74
End: 2024-01-01
Payer: MEDICARE

## 2024-01-01 ENCOUNTER — TELEPHONE (OUTPATIENT)
Dept: FAMILY MEDICINE | Facility: CLINIC | Age: 74
End: 2024-01-01
Payer: MEDICARE

## 2024-01-01 ENCOUNTER — APPOINTMENT (OUTPATIENT)
Dept: GENERAL RADIOLOGY | Facility: CLINIC | Age: 74
End: 2024-01-01
Attending: FAMILY MEDICINE
Payer: MEDICARE

## 2024-01-01 ENCOUNTER — HOSPITAL ENCOUNTER (OUTPATIENT)
Dept: ULTRASOUND IMAGING | Facility: CLINIC | Age: 74
Discharge: HOME | End: 2024-03-05
Attending: STUDENT IN AN ORGANIZED HEALTH CARE EDUCATION/TRAINING PROGRAM | Admitting: STUDENT IN AN ORGANIZED HEALTH CARE EDUCATION/TRAINING PROGRAM
Payer: MEDICARE

## 2024-01-01 ENCOUNTER — OFFICE VISIT (OUTPATIENT)
Dept: FAMILY MEDICINE | Facility: CLINIC | Age: 74
End: 2024-01-01
Payer: MEDICARE

## 2024-01-01 ENCOUNTER — APPOINTMENT (OUTPATIENT)
Dept: CT IMAGING | Facility: CLINIC | Age: 74
DRG: 604 | End: 2024-01-01
Attending: EMERGENCY MEDICINE
Payer: MEDICARE

## 2024-01-01 ENCOUNTER — HOSPITAL ENCOUNTER (OUTPATIENT)
Dept: CT IMAGING | Facility: CLINIC | Age: 74
Discharge: HOME | End: 2024-01-09
Attending: PHYSICIAN ASSISTANT | Admitting: PHYSICIAN ASSISTANT
Payer: MEDICARE

## 2024-01-01 ENCOUNTER — HOSPITAL ENCOUNTER (INPATIENT)
Facility: CLINIC | Age: 74
LOS: 5 days | Discharge: HOME | DRG: 604 | End: 2024-02-11
Attending: EMERGENCY MEDICINE | Admitting: INTERNAL MEDICINE
Payer: MEDICARE

## 2024-01-01 ENCOUNTER — HOSPITAL ENCOUNTER (OUTPATIENT)
Facility: CLINIC | Age: 74
Discharge: HOME | End: 2024-01-15
Attending: INTERNAL MEDICINE | Admitting: INTERNAL MEDICINE
Payer: MEDICARE

## 2024-01-01 ENCOUNTER — HOSPITAL ENCOUNTER (EMERGENCY)
Facility: CLINIC | Age: 74
Discharge: ANOTHER HOSPITAL | DRG: 871 | End: 2024-01-26
Attending: STUDENT IN AN ORGANIZED HEALTH CARE EDUCATION/TRAINING PROGRAM | Admitting: STUDENT IN AN ORGANIZED HEALTH CARE EDUCATION/TRAINING PROGRAM
Payer: MEDICARE

## 2024-01-01 ENCOUNTER — PATIENT OUTREACH (OUTPATIENT)
Dept: CARE COORDINATION | Facility: CLINIC | Age: 74
End: 2024-01-01
Payer: MEDICARE

## 2024-01-01 ENCOUNTER — DOCUMENTATION ONLY (OUTPATIENT)
Dept: OTHER | Facility: CLINIC | Age: 74
End: 2024-01-01

## 2024-01-01 ENCOUNTER — MYC MEDICAL ADVICE (OUTPATIENT)
Dept: FAMILY MEDICINE | Facility: CLINIC | Age: 74
End: 2024-01-01

## 2024-01-01 ENCOUNTER — LAB (OUTPATIENT)
Dept: LAB | Facility: CLINIC | Age: 74
End: 2024-01-01
Payer: MEDICARE

## 2024-01-01 ENCOUNTER — HOSPITAL ENCOUNTER (EMERGENCY)
Facility: CLINIC | Age: 74
Discharge: HOME | End: 2024-03-12
Attending: NURSE PRACTITIONER | Admitting: NURSE PRACTITIONER
Payer: MEDICARE

## 2024-01-01 ENCOUNTER — HOSPITAL ENCOUNTER (EMERGENCY)
Facility: CLINIC | Age: 74
Discharge: HOME | End: 2024-03-15
Attending: FAMILY MEDICINE | Admitting: FAMILY MEDICINE
Payer: MEDICARE

## 2024-01-01 ENCOUNTER — APPOINTMENT (OUTPATIENT)
Dept: ULTRASOUND IMAGING | Facility: CLINIC | Age: 74
DRG: 871 | End: 2024-01-01
Attending: RADIOLOGY
Payer: MEDICARE

## 2024-01-01 ENCOUNTER — TRANSFERRED RECORDS (OUTPATIENT)
Dept: HEALTH INFORMATION MANAGEMENT | Facility: CLINIC | Age: 74
End: 2024-01-01

## 2024-01-01 ENCOUNTER — APPOINTMENT (OUTPATIENT)
Dept: ULTRASOUND IMAGING | Facility: CLINIC | Age: 74
DRG: 604 | End: 2024-01-01
Attending: INTERNAL MEDICINE
Payer: MEDICARE

## 2024-01-01 ENCOUNTER — HOSPITAL ENCOUNTER (OUTPATIENT)
Dept: CT IMAGING | Facility: CLINIC | Age: 74
Discharge: HOME | DRG: 871 | End: 2024-01-25
Attending: INTERNAL MEDICINE | Admitting: INTERNAL MEDICINE
Payer: MEDICARE

## 2024-01-01 ENCOUNTER — APPOINTMENT (OUTPATIENT)
Dept: GENERAL RADIOLOGY | Facility: CLINIC | Age: 74
End: 2024-01-01
Attending: INTERNAL MEDICINE
Payer: MEDICARE

## 2024-01-01 ENCOUNTER — APPOINTMENT (OUTPATIENT)
Dept: ULTRASOUND IMAGING | Facility: CLINIC | Age: 74
DRG: 871 | End: 2024-01-01
Attending: INTERNAL MEDICINE
Payer: MEDICARE

## 2024-01-01 ENCOUNTER — INFUSION THERAPY VISIT (OUTPATIENT)
Dept: INFUSION THERAPY | Facility: CLINIC | Age: 74
End: 2024-01-01
Attending: PEDIATRICS
Payer: MEDICARE

## 2024-01-01 ENCOUNTER — NURSE TRIAGE (OUTPATIENT)
Dept: FAMILY MEDICINE | Facility: CLINIC | Age: 74
End: 2024-01-01

## 2024-01-01 ENCOUNTER — TELEPHONE (OUTPATIENT)
Dept: FAMILY MEDICINE | Facility: CLINIC | Age: 74
End: 2024-01-01

## 2024-01-01 ENCOUNTER — ANESTHESIA EVENT (OUTPATIENT)
Dept: SURGERY | Facility: CLINIC | Age: 74
End: 2024-01-01
Payer: MEDICARE

## 2024-01-01 ENCOUNTER — ANESTHESIA (OUTPATIENT)
Dept: SURGERY | Facility: CLINIC | Age: 74
End: 2024-01-01
Payer: MEDICARE

## 2024-01-01 ENCOUNTER — PATIENT OUTREACH (OUTPATIENT)
Dept: CARE COORDINATION | Facility: CLINIC | Age: 74
End: 2024-01-01

## 2024-01-01 ENCOUNTER — TELEPHONE (OUTPATIENT)
Dept: UROLOGY | Facility: CLINIC | Age: 74
End: 2024-01-01

## 2024-01-01 ENCOUNTER — VIRTUAL VISIT (OUTPATIENT)
Dept: PALLIATIVE CARE | Facility: CLINIC | Age: 74
End: 2024-01-01
Attending: INTERNAL MEDICINE
Payer: MEDICARE

## 2024-01-01 ENCOUNTER — HOSPITAL ENCOUNTER (EMERGENCY)
Facility: CLINIC | Age: 74
Discharge: HOME | End: 2024-01-11
Attending: FAMILY MEDICINE | Admitting: FAMILY MEDICINE
Payer: MEDICARE

## 2024-01-01 ENCOUNTER — INFUSION THERAPY VISIT (OUTPATIENT)
Dept: INFUSION THERAPY | Facility: CLINIC | Age: 74
End: 2024-01-01
Attending: STUDENT IN AN ORGANIZED HEALTH CARE EDUCATION/TRAINING PROGRAM
Payer: MEDICARE

## 2024-01-01 ENCOUNTER — APPOINTMENT (OUTPATIENT)
Dept: GENERAL RADIOLOGY | Facility: CLINIC | Age: 74
DRG: 871 | End: 2024-01-01
Attending: STUDENT IN AN ORGANIZED HEALTH CARE EDUCATION/TRAINING PROGRAM
Payer: MEDICARE

## 2024-01-01 ENCOUNTER — HOSPITAL ENCOUNTER (EMERGENCY)
Facility: CLINIC | Age: 74
Discharge: HOME | End: 2024-04-26
Attending: PHYSICIAN ASSISTANT | Admitting: PHYSICIAN ASSISTANT
Payer: OTHER MISCELLANEOUS

## 2024-01-01 ENCOUNTER — HOSPITAL ENCOUNTER (OUTPATIENT)
Dept: CT IMAGING | Facility: CLINIC | Age: 74
Discharge: HOME | End: 2024-04-03
Attending: STUDENT IN AN ORGANIZED HEALTH CARE EDUCATION/TRAINING PROGRAM | Admitting: STUDENT IN AN ORGANIZED HEALTH CARE EDUCATION/TRAINING PROGRAM
Payer: MEDICARE

## 2024-01-01 ENCOUNTER — HOSPITAL ENCOUNTER (INPATIENT)
Facility: CLINIC | Age: 74
LOS: 5 days | Discharge: HOME | DRG: 871 | End: 2024-01-31
Attending: INTERNAL MEDICINE | Admitting: HOSPITALIST
Payer: MEDICARE

## 2024-01-01 ENCOUNTER — OFFICE VISIT (OUTPATIENT)
Dept: UROLOGY | Facility: CLINIC | Age: 74
End: 2024-01-01
Payer: MEDICARE

## 2024-01-01 VITALS
TEMPERATURE: 96.9 F | SYSTOLIC BLOOD PRESSURE: 118 MMHG | HEIGHT: 69 IN | RESPIRATION RATE: 16 BRPM | BODY MASS INDEX: 28.42 KG/M2 | OXYGEN SATURATION: 96 % | DIASTOLIC BLOOD PRESSURE: 70 MMHG | WEIGHT: 191.9 LBS | HEART RATE: 68 BPM

## 2024-01-01 VITALS
TEMPERATURE: 99.5 F | DIASTOLIC BLOOD PRESSURE: 65 MMHG | BODY MASS INDEX: 30.31 KG/M2 | SYSTOLIC BLOOD PRESSURE: 118 MMHG | RESPIRATION RATE: 18 BRPM | HEART RATE: 104 BPM | WEIGHT: 200 LBS | HEIGHT: 68 IN | OXYGEN SATURATION: 94 %

## 2024-01-01 VITALS
TEMPERATURE: 97.5 F | OXYGEN SATURATION: 99 % | HEART RATE: 64 BPM | HEIGHT: 68 IN | BODY MASS INDEX: 28.81 KG/M2 | RESPIRATION RATE: 18 BRPM | SYSTOLIC BLOOD PRESSURE: 110 MMHG | WEIGHT: 190.1 LBS | DIASTOLIC BLOOD PRESSURE: 68 MMHG

## 2024-01-01 VITALS
OXYGEN SATURATION: 90 % | TEMPERATURE: 97.7 F | DIASTOLIC BLOOD PRESSURE: 75 MMHG | SYSTOLIC BLOOD PRESSURE: 111 MMHG | HEART RATE: 67 BPM

## 2024-01-01 VITALS
SYSTOLIC BLOOD PRESSURE: 110 MMHG | DIASTOLIC BLOOD PRESSURE: 78 MMHG | HEART RATE: 72 BPM | TEMPERATURE: 97.8 F | OXYGEN SATURATION: 99 % | BODY MASS INDEX: 26.83 KG/M2 | RESPIRATION RATE: 16 BRPM | HEIGHT: 68 IN | WEIGHT: 177 LBS

## 2024-01-01 VITALS — HEIGHT: 68 IN | BODY MASS INDEX: 25.76 KG/M2 | WEIGHT: 170 LBS

## 2024-01-01 VITALS
BODY MASS INDEX: 27.89 KG/M2 | HEART RATE: 105 BPM | OXYGEN SATURATION: 98 % | HEIGHT: 68 IN | SYSTOLIC BLOOD PRESSURE: 108 MMHG | WEIGHT: 184 LBS | DIASTOLIC BLOOD PRESSURE: 64 MMHG | RESPIRATION RATE: 12 BRPM | TEMPERATURE: 97.9 F

## 2024-01-01 VITALS
WEIGHT: 201.8 LBS | HEART RATE: 88 BPM | RESPIRATION RATE: 18 BRPM | DIASTOLIC BLOOD PRESSURE: 60 MMHG | SYSTOLIC BLOOD PRESSURE: 102 MMHG | OXYGEN SATURATION: 100 % | TEMPERATURE: 96.9 F | BODY MASS INDEX: 30.68 KG/M2

## 2024-01-01 VITALS
DIASTOLIC BLOOD PRESSURE: 68 MMHG | SYSTOLIC BLOOD PRESSURE: 105 MMHG | OXYGEN SATURATION: 96 % | TEMPERATURE: 97.4 F | HEIGHT: 68 IN | RESPIRATION RATE: 20 BRPM | BODY MASS INDEX: 28.79 KG/M2 | HEART RATE: 79 BPM | WEIGHT: 190 LBS

## 2024-01-01 VITALS
HEART RATE: 70 BPM | TEMPERATURE: 97.7 F | WEIGHT: 192.3 LBS | RESPIRATION RATE: 18 BRPM | OXYGEN SATURATION: 99 % | BODY MASS INDEX: 29.15 KG/M2 | SYSTOLIC BLOOD PRESSURE: 125 MMHG | HEIGHT: 68 IN | DIASTOLIC BLOOD PRESSURE: 63 MMHG

## 2024-01-01 VITALS
HEART RATE: 81 BPM | OXYGEN SATURATION: 98 % | BODY MASS INDEX: 26.83 KG/M2 | DIASTOLIC BLOOD PRESSURE: 65 MMHG | WEIGHT: 177 LBS | SYSTOLIC BLOOD PRESSURE: 110 MMHG | TEMPERATURE: 97.5 F | HEIGHT: 68 IN | RESPIRATION RATE: 20 BRPM

## 2024-01-01 VITALS
HEIGHT: 69 IN | HEART RATE: 86 BPM | OXYGEN SATURATION: 100 % | TEMPERATURE: 97.7 F | DIASTOLIC BLOOD PRESSURE: 62 MMHG | RESPIRATION RATE: 18 BRPM | WEIGHT: 202.5 LBS | SYSTOLIC BLOOD PRESSURE: 100 MMHG | BODY MASS INDEX: 29.99 KG/M2

## 2024-01-01 VITALS
TEMPERATURE: 97.2 F | SYSTOLIC BLOOD PRESSURE: 109 MMHG | HEART RATE: 76 BPM | BODY MASS INDEX: 29.76 KG/M2 | OXYGEN SATURATION: 99 % | WEIGHT: 190 LBS | RESPIRATION RATE: 18 BRPM | DIASTOLIC BLOOD PRESSURE: 75 MMHG

## 2024-01-01 VITALS
HEART RATE: 94 BPM | OXYGEN SATURATION: 96 % | DIASTOLIC BLOOD PRESSURE: 81 MMHG | TEMPERATURE: 98.5 F | RESPIRATION RATE: 20 BRPM | SYSTOLIC BLOOD PRESSURE: 155 MMHG

## 2024-01-01 VITALS
WEIGHT: 208.1 LBS | HEART RATE: 74 BPM | OXYGEN SATURATION: 96 % | DIASTOLIC BLOOD PRESSURE: 68 MMHG | SYSTOLIC BLOOD PRESSURE: 125 MMHG | BODY MASS INDEX: 31.64 KG/M2 | RESPIRATION RATE: 18 BRPM | TEMPERATURE: 98.3 F

## 2024-01-01 VITALS
RESPIRATION RATE: 14 BRPM | TEMPERATURE: 98.5 F | HEART RATE: 80 BPM | SYSTOLIC BLOOD PRESSURE: 128 MMHG | DIASTOLIC BLOOD PRESSURE: 64 MMHG | BODY MASS INDEX: 29.92 KG/M2 | WEIGHT: 196.8 LBS | OXYGEN SATURATION: 99 %

## 2024-01-01 DIAGNOSIS — Z79.2 LONG TERM (CURRENT) USE OF ANTIBIOTICS: Primary | ICD-10-CM

## 2024-01-01 DIAGNOSIS — R11.2 NAUSEA AND VOMITING, UNSPECIFIED VOMITING TYPE: Primary | ICD-10-CM

## 2024-01-01 DIAGNOSIS — C09.9 SQUAMOUS CELL CARCINOMA OF LEFT TONSIL (H): ICD-10-CM

## 2024-01-01 DIAGNOSIS — C79.52 SECONDARY MALIGNANT NEOPLASM OF BONE AND BONE MARROW (H): ICD-10-CM

## 2024-01-01 DIAGNOSIS — F43.23 ADJUSTMENT DISORDER WITH MIXED ANXIETY AND DEPRESSED MOOD: ICD-10-CM

## 2024-01-01 DIAGNOSIS — Z87.898 HISTORY OF BACTEREMIA: ICD-10-CM

## 2024-01-01 DIAGNOSIS — G47.01 SLEEP DISORDER DUE TO A GENERAL MEDICAL CONDITION, INSOMNIA TYPE: ICD-10-CM

## 2024-01-01 DIAGNOSIS — G89.3 CANCER ASSOCIATED PAIN: ICD-10-CM

## 2024-01-01 DIAGNOSIS — D69.6 THROMBOCYTOPENIA (H): ICD-10-CM

## 2024-01-01 DIAGNOSIS — L27.0 DRUG ERUPTION: Primary | ICD-10-CM

## 2024-01-01 DIAGNOSIS — R50.9 FEVER IN ADULT: ICD-10-CM

## 2024-01-01 DIAGNOSIS — S30.1XXA RECTUS SHEATH HEMATOMA, INITIAL ENCOUNTER: ICD-10-CM

## 2024-01-01 DIAGNOSIS — K76.89 HEPATIC CYST: ICD-10-CM

## 2024-01-01 DIAGNOSIS — C79.51 MALIGNANT NEOPLASM METASTATIC TO BONE (H): ICD-10-CM

## 2024-01-01 DIAGNOSIS — Z23 NEED FOR PROPHYLACTIC VACCINATION AGAINST HEPATITIS A: ICD-10-CM

## 2024-01-01 DIAGNOSIS — I10 HYPERTENSION GOAL BP (BLOOD PRESSURE) < 140/90: ICD-10-CM

## 2024-01-01 DIAGNOSIS — M19.071 OSTEOARTHRITIS OF RIGHT FOOT, UNSPECIFIED OSTEOARTHRITIS TYPE: ICD-10-CM

## 2024-01-01 DIAGNOSIS — Z71.89 ADVANCE CARE PLANNING: ICD-10-CM

## 2024-01-01 DIAGNOSIS — R33.9 URINARY RETENTION: ICD-10-CM

## 2024-01-01 DIAGNOSIS — N18.30 STAGE 3 CHRONIC KIDNEY DISEASE, UNSPECIFIED WHETHER STAGE 3A OR 3B CKD (H): ICD-10-CM

## 2024-01-01 DIAGNOSIS — E03.4 HYPOTHYROIDISM DUE TO ACQUIRED ATROPHY OF THYROID: Primary | ICD-10-CM

## 2024-01-01 DIAGNOSIS — I82.A12 ACUTE DEEP VEIN THROMBOSIS (DVT) OF AXILLARY VEIN OF LEFT UPPER EXTREMITY (H): Primary | ICD-10-CM

## 2024-01-01 DIAGNOSIS — Z95.5 STATUS POST INSERTION OF DRUG-ELUTING STENT INTO LEFT ANTERIOR DESCENDING ARTERY FOR CORONARY ARTERY DISEASE: ICD-10-CM

## 2024-01-01 DIAGNOSIS — C76.0 MALIGNANT NEOPLASM OF HEAD, FACE, AND NECK (H): ICD-10-CM

## 2024-01-01 DIAGNOSIS — Z90.49 S/P CHOLECYSTECTOMY: ICD-10-CM

## 2024-01-01 DIAGNOSIS — F11.20 UNCOMPLICATED OPIOID DEPENDENCE (H): ICD-10-CM

## 2024-01-01 DIAGNOSIS — L29.9 ITCHING: ICD-10-CM

## 2024-01-01 DIAGNOSIS — K75.0 LIVER ABSCESS: ICD-10-CM

## 2024-01-01 DIAGNOSIS — R16.0 LIVER MASS: Primary | ICD-10-CM

## 2024-01-01 DIAGNOSIS — Z79.2 ENCOUNTER FOR LONG-TERM (CURRENT) USE OF ANTIBIOTICS: ICD-10-CM

## 2024-01-01 DIAGNOSIS — C76.0 MALIGNANT NEOPLASM OF HEAD, FACE, AND NECK (H): Primary | ICD-10-CM

## 2024-01-01 DIAGNOSIS — T78.40XA ALLERGIC REACTION, INITIAL ENCOUNTER: Primary | ICD-10-CM

## 2024-01-01 DIAGNOSIS — R16.0 LIVER MASS: ICD-10-CM

## 2024-01-01 DIAGNOSIS — I82.A12 ACUTE DEEP VEIN THROMBOSIS (DVT) OF AXILLARY VEIN OF LEFT UPPER EXTREMITY (H): ICD-10-CM

## 2024-01-01 DIAGNOSIS — E03.4 HYPOTHYROIDISM DUE TO ACQUIRED ATROPHY OF THYROID: ICD-10-CM

## 2024-01-01 DIAGNOSIS — C78.7 METASTATIC CANCER TO LIVER (H): ICD-10-CM

## 2024-01-01 DIAGNOSIS — E86.0 DEHYDRATION: ICD-10-CM

## 2024-01-01 DIAGNOSIS — G89.28 OTHER CHRONIC POSTPROCEDURAL PAIN: ICD-10-CM

## 2024-01-01 DIAGNOSIS — T50.905A ADVERSE EFFECT OF DRUG, INITIAL ENCOUNTER: ICD-10-CM

## 2024-01-01 DIAGNOSIS — D61.818 PANCYTOPENIA (H): ICD-10-CM

## 2024-01-01 DIAGNOSIS — C09.9 SQUAMOUS CELL CARCINOMA OF LEFT TONSIL (H): Primary | ICD-10-CM

## 2024-01-01 DIAGNOSIS — T82.898A OCCLUSION OF PERIPHERALLY INSERTED CENTRAL CATHETER (PICC) LINE, INITIAL ENCOUNTER (H): ICD-10-CM

## 2024-01-01 DIAGNOSIS — I25.10 ATHEROSCLEROSIS OF NATIVE CORONARY ARTERY OF NATIVE HEART WITHOUT ANGINA PECTORIS: ICD-10-CM

## 2024-01-01 DIAGNOSIS — K75.0 LIVER ABSCESS: Primary | ICD-10-CM

## 2024-01-01 DIAGNOSIS — E88.09 HYPOALBUMINEMIA: ICD-10-CM

## 2024-01-01 DIAGNOSIS — F43.23 ADJUSTMENT DISORDER WITH MIXED ANXIETY AND DEPRESSED MOOD: Primary | ICD-10-CM

## 2024-01-01 DIAGNOSIS — L29.9 ITCHING: Primary | ICD-10-CM

## 2024-01-01 DIAGNOSIS — Z09 HOSPITAL DISCHARGE FOLLOW-UP: ICD-10-CM

## 2024-01-01 DIAGNOSIS — D64.9 ANEMIA, UNSPECIFIED TYPE: ICD-10-CM

## 2024-01-01 DIAGNOSIS — E87.70 HYPERVOLEMIA, UNSPECIFIED HYPERVOLEMIA TYPE: ICD-10-CM

## 2024-01-01 DIAGNOSIS — Z79.2 ENCOUNTER FOR LONG-TERM (CURRENT) USE OF ANTIBIOTICS: Primary | ICD-10-CM

## 2024-01-01 DIAGNOSIS — Z79.2 ANTIBIOTIC LONG-TERM USE: ICD-10-CM

## 2024-01-01 DIAGNOSIS — D62 ANEMIA DUE TO BLOOD LOSS, ACUTE: ICD-10-CM

## 2024-01-01 DIAGNOSIS — C79.51 SECONDARY MALIGNANT NEOPLASM OF BONE AND BONE MARROW (H): ICD-10-CM

## 2024-01-01 DIAGNOSIS — R65.20 SEPSIS WITH ACUTE ORGAN DYSFUNCTION WITHOUT SEPTIC SHOCK, DUE TO UNSPECIFIED ORGANISM, UNSPECIFIED ORGAN DYSFUNCTION TYPE (H): ICD-10-CM

## 2024-01-01 DIAGNOSIS — Z01.818 PREOP GENERAL PHYSICAL EXAM: Primary | ICD-10-CM

## 2024-01-01 DIAGNOSIS — I82.622 DEEP VEIN THROMBOSIS (DVT) OF LEFT UPPER EXTREMITY, UNSPECIFIED CHRONICITY, UNSPECIFIED VEIN (H): ICD-10-CM

## 2024-01-01 DIAGNOSIS — E43 SEVERE PROTEIN-CALORIE MALNUTRITION (H): ICD-10-CM

## 2024-01-01 DIAGNOSIS — N17.9 AKI (ACUTE KIDNEY INJURY) (H): ICD-10-CM

## 2024-01-01 DIAGNOSIS — E87.6 HYPOKALEMIA: ICD-10-CM

## 2024-01-01 DIAGNOSIS — D69.1 ASPIRIN-LIKE PLATELET FUNCTION DEFECT (H): ICD-10-CM

## 2024-01-01 DIAGNOSIS — Z79.899 NEED FOR PROPHYLACTIC CHEMOTHERAPY: ICD-10-CM

## 2024-01-01 DIAGNOSIS — C78.7 METASTATIC CANCER TO LIVER (H): Primary | ICD-10-CM

## 2024-01-01 DIAGNOSIS — R60.0 BILATERAL LOWER EXTREMITY EDEMA: Primary | ICD-10-CM

## 2024-01-01 DIAGNOSIS — G89.3 NEOPLASM RELATED PAIN: ICD-10-CM

## 2024-01-01 DIAGNOSIS — G89.3 CANCER ASSOCIATED PAIN: Primary | ICD-10-CM

## 2024-01-01 DIAGNOSIS — S40.012A CONTUSION OF LEFT SHOULDER, INITIAL ENCOUNTER: ICD-10-CM

## 2024-01-01 DIAGNOSIS — A41.9 SEPSIS WITH ACUTE ORGAN DYSFUNCTION WITHOUT SEPTIC SHOCK, DUE TO UNSPECIFIED ORGANISM, UNSPECIFIED ORGAN DYSFUNCTION TYPE (H): ICD-10-CM

## 2024-01-01 DIAGNOSIS — T50.905D ADVERSE EFFECT OF DRUG, SUBSEQUENT ENCOUNTER: ICD-10-CM

## 2024-01-01 DIAGNOSIS — R33.9 URINARY RETENTION: Primary | ICD-10-CM

## 2024-01-01 DIAGNOSIS — B37.0 CANDIDIASIS OF MOUTH: Primary | ICD-10-CM

## 2024-01-01 DIAGNOSIS — C61 MALIGNANT NEOPLASM OF PROSTATE (H): ICD-10-CM

## 2024-01-01 DIAGNOSIS — R79.89 ELEVATED SERUM CREATININE: ICD-10-CM

## 2024-01-01 DIAGNOSIS — E83.42 HYPOMAGNESEMIA: ICD-10-CM

## 2024-01-01 LAB
ABO/RH(D): NORMAL
ACANTHOCYTES BLD QL SMEAR: ABNORMAL
ALBUMIN SERPL BCG-MCNC: 2.5 G/DL (ref 3.5–5.2)
ALBUMIN SERPL BCG-MCNC: 2.6 G/DL (ref 3.5–5.2)
ALBUMIN SERPL BCG-MCNC: 2.6 G/DL (ref 3.5–5.2)
ALBUMIN SERPL BCG-MCNC: 2.8 G/DL (ref 3.5–5.2)
ALBUMIN SERPL BCG-MCNC: 2.9 G/DL (ref 3.5–5.2)
ALBUMIN SERPL BCG-MCNC: 3.5 G/DL (ref 3.5–5.2)
ALBUMIN SERPL BCG-MCNC: 3.7 G/DL (ref 3.5–5.2)
ALBUMIN SERPL BCG-MCNC: 3.7 G/DL (ref 3.5–5.2)
ALBUMIN SERPL BCG-MCNC: 3.8 G/DL (ref 3.5–5.2)
ALBUMIN UR-MCNC: 30 MG/DL
ALBUMIN UR-MCNC: 30 MG/DL
ALBUMIN UR-MCNC: NEGATIVE MG/DL
ALP SERPL-CCNC: 197 U/L (ref 40–150)
ALP SERPL-CCNC: 209 U/L (ref 40–150)
ALP SERPL-CCNC: 214 U/L (ref 40–150)
ALP SERPL-CCNC: 224 U/L (ref 40–150)
ALP SERPL-CCNC: 226 U/L (ref 40–150)
ALP SERPL-CCNC: 232 U/L (ref 40–150)
ALP SERPL-CCNC: 233 U/L (ref 40–150)
ALP SERPL-CCNC: 244 U/L (ref 40–150)
ALP SERPL-CCNC: 257 U/L (ref 40–150)
ALP SERPL-CCNC: 263 U/L (ref 40–150)
ALP SERPL-CCNC: 283 U/L (ref 40–150)
ALT SERPL W P-5'-P-CCNC: 104 U/L (ref 0–70)
ALT SERPL W P-5'-P-CCNC: 119 U/L (ref 0–70)
ALT SERPL W P-5'-P-CCNC: 130 U/L (ref 0–70)
ALT SERPL W P-5'-P-CCNC: 155 U/L (ref 0–70)
ALT SERPL W P-5'-P-CCNC: 31 U/L (ref 0–70)
ALT SERPL W P-5'-P-CCNC: 34 U/L (ref 0–70)
ALT SERPL W P-5'-P-CCNC: 36 U/L (ref 0–70)
ALT SERPL W P-5'-P-CCNC: 37 U/L (ref 0–70)
ALT SERPL W P-5'-P-CCNC: 52 U/L (ref 0–70)
ALT SERPL W P-5'-P-CCNC: 64 U/L (ref 0–70)
ALT SERPL W P-5'-P-CCNC: 66 U/L (ref 0–70)
ALT SERPL W P-5'-P-CCNC: 68 U/L (ref 0–70)
ALT SERPL W P-5'-P-CCNC: 69 U/L (ref 0–70)
ANION GAP SERPL CALCULATED.3IONS-SCNC: 10 MMOL/L (ref 7–15)
ANION GAP SERPL CALCULATED.3IONS-SCNC: 11 MMOL/L (ref 7–15)
ANION GAP SERPL CALCULATED.3IONS-SCNC: 11 MMOL/L (ref 7–15)
ANION GAP SERPL CALCULATED.3IONS-SCNC: 12 MMOL/L (ref 7–15)
ANION GAP SERPL CALCULATED.3IONS-SCNC: 14 MMOL/L (ref 7–15)
ANION GAP SERPL CALCULATED.3IONS-SCNC: 15 MMOL/L (ref 7–15)
ANION GAP SERPL CALCULATED.3IONS-SCNC: 6 MMOL/L (ref 7–15)
ANION GAP SERPL CALCULATED.3IONS-SCNC: 8 MMOL/L (ref 7–15)
ANION GAP SERPL CALCULATED.3IONS-SCNC: 8 MMOL/L (ref 7–15)
ANION GAP SERPL CALCULATED.3IONS-SCNC: 9 MMOL/L (ref 7–15)
ANTIBODY SCREEN: NEGATIVE
APPEARANCE UR: CLEAR
APTT PPP: 113 SECONDS (ref 22–38)
APTT PPP: 35 SECONDS (ref 22–38)
APTT PPP: 36 SECONDS (ref 22–38)
APTT PPP: 77 SECONDS (ref 22–38)
APTT PPP: 83 SECONDS (ref 22–38)
AST SERPL W P-5'-P-CCNC: 112 U/L (ref 0–45)
AST SERPL W P-5'-P-CCNC: 118 U/L (ref 0–45)
AST SERPL W P-5'-P-CCNC: 131 U/L (ref 0–45)
AST SERPL W P-5'-P-CCNC: 183 U/L (ref 0–45)
AST SERPL W P-5'-P-CCNC: 39 U/L (ref 0–45)
AST SERPL W P-5'-P-CCNC: 44 U/L (ref 0–45)
AST SERPL W P-5'-P-CCNC: 44 U/L (ref 0–45)
AST SERPL W P-5'-P-CCNC: 47 U/L (ref 0–45)
AST SERPL W P-5'-P-CCNC: 62 U/L (ref 0–45)
AUER BODIES BLD QL SMEAR: ABNORMAL
BACTERIA BLD CULT: NO GROWTH
BACTERIA BLD CULT: NO GROWTH
BASE EXCESS BLDV CALC-SCNC: -2.6 MMOL/L (ref -3–3)
BASE EXCESS BLDV CALC-SCNC: -4.3 MMOL/L (ref -3–3)
BASE EXCESS BLDV CALC-SCNC: -4.7 MMOL/L (ref -3–3)
BASO STIPL BLD QL SMEAR: ABNORMAL
BASOPHILS # BLD AUTO: 0 10E3/UL (ref 0–0.2)
BASOPHILS NFR BLD AUTO: 0 %
BASOPHILS NFR BLD AUTO: 1 %
BILIRUB SERPL-MCNC: 0.2 MG/DL
BILIRUB SERPL-MCNC: 0.3 MG/DL
BILIRUB SERPL-MCNC: 0.4 MG/DL
BILIRUB SERPL-MCNC: 0.5 MG/DL
BILIRUB SERPL-MCNC: 0.5 MG/DL
BILIRUB SERPL-MCNC: 0.8 MG/DL
BILIRUB UR QL STRIP: NEGATIVE
BITE CELLS BLD QL SMEAR: ABNORMAL
BLD PROD TYP BPU: NORMAL
BLD PROD TYP BPU: NORMAL
BLISTER CELLS BLD QL SMEAR: ABNORMAL
BLOOD COMPONENT TYPE: NORMAL
BLOOD COMPONENT TYPE: NORMAL
BUN SERPL-MCNC: 12.7 MG/DL (ref 8–23)
BUN SERPL-MCNC: 13 MG/DL (ref 8–23)
BUN SERPL-MCNC: 13.2 MG/DL (ref 8–23)
BUN SERPL-MCNC: 13.3 MG/DL (ref 8–23)
BUN SERPL-MCNC: 14.1 MG/DL (ref 8–23)
BUN SERPL-MCNC: 15.4 MG/DL (ref 8–23)
BUN SERPL-MCNC: 22.5 MG/DL (ref 8–23)
BUN SERPL-MCNC: 24.6 MG/DL (ref 8–23)
BUN SERPL-MCNC: 30.3 MG/DL (ref 8–23)
BUN SERPL-MCNC: 30.5 MG/DL (ref 8–23)
BUN SERPL-MCNC: 30.8 MG/DL (ref 8–23)
BUN SERPL-MCNC: 31.2 MG/DL (ref 8–23)
BUN SERPL-MCNC: 32.8 MG/DL (ref 8–23)
BUN SERPL-MCNC: 34.1 MG/DL (ref 8–23)
BUN SERPL-MCNC: 35.9 MG/DL (ref 8–23)
BUN SERPL-MCNC: 38.7 MG/DL (ref 8–23)
BUN SERPL-MCNC: 42.6 MG/DL (ref 8–23)
BUN SERPL-MCNC: 47.4 MG/DL (ref 8–23)
BURR CELLS BLD QL SMEAR: ABNORMAL
CA-I BLD-MCNC: 5.1 MG/DL (ref 4.4–5.2)
CALCIUM SERPL-MCNC: 7.5 MG/DL (ref 8.8–10.2)
CALCIUM SERPL-MCNC: 7.7 MG/DL (ref 8.8–10.2)
CALCIUM SERPL-MCNC: 7.8 MG/DL (ref 8.8–10.2)
CALCIUM SERPL-MCNC: 8 MG/DL (ref 8.8–10.2)
CALCIUM SERPL-MCNC: 8 MG/DL (ref 8.8–10.2)
CALCIUM SERPL-MCNC: 8.1 MG/DL (ref 8.8–10.2)
CALCIUM SERPL-MCNC: 8.2 MG/DL (ref 8.8–10.2)
CALCIUM SERPL-MCNC: 8.2 MG/DL (ref 8.8–10.2)
CALCIUM SERPL-MCNC: 8.3 MG/DL (ref 8.8–10.2)
CALCIUM SERPL-MCNC: 8.4 MG/DL (ref 8.8–10.2)
CALCIUM SERPL-MCNC: 8.8 MG/DL (ref 8.8–10.2)
CALCIUM SERPL-MCNC: 9.2 MG/DL (ref 8.8–10.2)
CALCIUM SERPL-MCNC: 9.5 MG/DL (ref 8.8–10.2)
CALCIUM SERPL-MCNC: 9.5 MG/DL (ref 8.8–10.2)
CALCIUM SERPL-MCNC: 9.8 MG/DL (ref 8.8–10.2)
CALCIUM SERPL-MCNC: 9.9 MG/DL (ref 8.8–10.2)
CHLORIDE SERPL-SCNC: 102 MMOL/L (ref 98–107)
CHLORIDE SERPL-SCNC: 104 MMOL/L (ref 98–107)
CHLORIDE SERPL-SCNC: 104 MMOL/L (ref 98–107)
CHLORIDE SERPL-SCNC: 106 MMOL/L (ref 98–107)
CHLORIDE SERPL-SCNC: 108 MMOL/L (ref 98–107)
CHLORIDE SERPL-SCNC: 108 MMOL/L (ref 98–107)
CHLORIDE SERPL-SCNC: 111 MMOL/L (ref 98–107)
CHLORIDE SERPL-SCNC: 112 MMOL/L (ref 98–107)
CHLORIDE SERPL-SCNC: 113 MMOL/L (ref 98–107)
CHLORIDE SERPL-SCNC: 114 MMOL/L (ref 98–107)
CHLORIDE SERPL-SCNC: 115 MMOL/L (ref 98–107)
CHLORIDE SERPL-SCNC: 116 MMOL/L (ref 98–107)
CHLORIDE SERPL-SCNC: 99 MMOL/L (ref 98–107)
CODING SYSTEM: NORMAL
CODING SYSTEM: NORMAL
COLOR UR AUTO: ABNORMAL
COLOR UR AUTO: YELLOW
COLOR UR AUTO: YELLOW
CREAT BLD-MCNC: 1.8 MG/DL (ref 0.7–1.3)
CREAT SERPL-MCNC: 0.97 MG/DL (ref 0.67–1.17)
CREAT SERPL-MCNC: 0.99 MG/DL (ref 0.67–1.17)
CREAT SERPL-MCNC: 1.01 MG/DL (ref 0.67–1.17)
CREAT SERPL-MCNC: 1.07 MG/DL (ref 0.67–1.17)
CREAT SERPL-MCNC: 1.08 MG/DL (ref 0.67–1.17)
CREAT SERPL-MCNC: 1.08 MG/DL (ref 0.67–1.17)
CREAT SERPL-MCNC: 1.16 MG/DL (ref 0.67–1.17)
CREAT SERPL-MCNC: 1.19 MG/DL (ref 0.67–1.17)
CREAT SERPL-MCNC: 1.2 MG/DL (ref 0.67–1.17)
CREAT SERPL-MCNC: 1.28 MG/DL (ref 0.67–1.17)
CREAT SERPL-MCNC: 1.32 MG/DL (ref 0.67–1.17)
CREAT SERPL-MCNC: 1.52 MG/DL (ref 0.67–1.17)
CREAT SERPL-MCNC: 1.58 MG/DL (ref 0.67–1.17)
CREAT SERPL-MCNC: 1.63 MG/DL (ref 0.67–1.17)
CREAT SERPL-MCNC: 1.68 MG/DL (ref 0.67–1.17)
CREAT SERPL-MCNC: 1.77 MG/DL (ref 0.67–1.17)
CREAT SERPL-MCNC: 1.85 MG/DL (ref 0.67–1.17)
CREAT SERPL-MCNC: 1.86 MG/DL (ref 0.67–1.17)
CREAT SERPL-MCNC: 1.95 MG/DL (ref 0.67–1.17)
CREAT SERPL-MCNC: 2.03 MG/DL (ref 0.67–1.17)
CREAT SERPL-MCNC: 2.05 MG/DL (ref 0.67–1.17)
CREAT SERPL-MCNC: 2.43 MG/DL (ref 0.67–1.17)
CREAT SERPL-MCNC: 2.62 MG/DL (ref 0.67–1.17)
CROSSMATCH: NORMAL
CROSSMATCH: NORMAL
CRP SERPL-MCNC: 12.08 MG/L
CRP SERPL-MCNC: 27.47 MG/L
CRP SERPL-MCNC: 27.7 MG/L
CRP SERPL-MCNC: 50.52 MG/L
DACRYOCYTES BLD QL SMEAR: ABNORMAL
DEPRECATED HCO3 PLAS-SCNC: 12 MMOL/L (ref 22–29)
DEPRECATED HCO3 PLAS-SCNC: 13 MMOL/L (ref 22–29)
DEPRECATED HCO3 PLAS-SCNC: 14 MMOL/L (ref 22–29)
DEPRECATED HCO3 PLAS-SCNC: 15 MMOL/L (ref 22–29)
DEPRECATED HCO3 PLAS-SCNC: 16 MMOL/L (ref 22–29)
DEPRECATED HCO3 PLAS-SCNC: 17 MMOL/L (ref 22–29)
DEPRECATED HCO3 PLAS-SCNC: 18 MMOL/L (ref 22–29)
DEPRECATED HCO3 PLAS-SCNC: 18 MMOL/L (ref 22–29)
DEPRECATED HCO3 PLAS-SCNC: 19 MMOL/L (ref 22–29)
DEPRECATED HCO3 PLAS-SCNC: 20 MMOL/L (ref 22–29)
DEPRECATED HCO3 PLAS-SCNC: 20 MMOL/L (ref 22–29)
EGFRCR SERPLBLD CKD-EPI 2021: 25 ML/MIN/1.73M2
EGFRCR SERPLBLD CKD-EPI 2021: 28 ML/MIN/1.73M2
EGFRCR SERPLBLD CKD-EPI 2021: 34 ML/MIN/1.73M2
EGFRCR SERPLBLD CKD-EPI 2021: 34 ML/MIN/1.73M2
EGFRCR SERPLBLD CKD-EPI 2021: 36 ML/MIN/1.73M2
EGFRCR SERPLBLD CKD-EPI 2021: 38 ML/MIN/1.73M2
EGFRCR SERPLBLD CKD-EPI 2021: 38 ML/MIN/1.73M2
EGFRCR SERPLBLD CKD-EPI 2021: 39 ML/MIN/1.73M2
EGFRCR SERPLBLD CKD-EPI 2021: 40 ML/MIN/1.73M2
EGFRCR SERPLBLD CKD-EPI 2021: 43 ML/MIN/1.73M2
EGFRCR SERPLBLD CKD-EPI 2021: 44 ML/MIN/1.73M2
EGFRCR SERPLBLD CKD-EPI 2021: 46 ML/MIN/1.73M2
EGFRCR SERPLBLD CKD-EPI 2021: 48 ML/MIN/1.73M2
EGFRCR SERPLBLD CKD-EPI 2021: 57 ML/MIN/1.73M2
EGFRCR SERPLBLD CKD-EPI 2021: 59 ML/MIN/1.73M2
EGFRCR SERPLBLD CKD-EPI 2021: 64 ML/MIN/1.73M2
EGFRCR SERPLBLD CKD-EPI 2021: 65 ML/MIN/1.73M2
EGFRCR SERPLBLD CKD-EPI 2021: 67 ML/MIN/1.73M2
EGFRCR SERPLBLD CKD-EPI 2021: 73 ML/MIN/1.73M2
EGFRCR SERPLBLD CKD-EPI 2021: 73 ML/MIN/1.73M2
EGFRCR SERPLBLD CKD-EPI 2021: 74 ML/MIN/1.73M2
EGFRCR SERPLBLD CKD-EPI 2021: 79 ML/MIN/1.73M2
EGFRCR SERPLBLD CKD-EPI 2021: 81 ML/MIN/1.73M2
EGFRCR SERPLBLD CKD-EPI 2021: 83 ML/MIN/1.73M2
ELLIPTOCYTES BLD QL SMEAR: ABNORMAL
EOSINOPHIL # BLD AUTO: 0 10E3/UL (ref 0–0.7)
EOSINOPHIL # BLD AUTO: 0.2 10E3/UL (ref 0–0.7)
EOSINOPHIL NFR BLD AUTO: 0 %
EOSINOPHIL NFR BLD AUTO: 2 %
EOSINOPHIL NFR BLD AUTO: 3 %
ERCP: NORMAL
ERYTHROCYTE [DISTWIDTH] IN BLOOD BY AUTOMATED COUNT: 18 % (ref 10–15)
ERYTHROCYTE [DISTWIDTH] IN BLOOD BY AUTOMATED COUNT: 18.2 % (ref 10–15)
ERYTHROCYTE [DISTWIDTH] IN BLOOD BY AUTOMATED COUNT: 18.3 % (ref 10–15)
ERYTHROCYTE [DISTWIDTH] IN BLOOD BY AUTOMATED COUNT: 18.3 % (ref 10–15)
ERYTHROCYTE [DISTWIDTH] IN BLOOD BY AUTOMATED COUNT: 18.4 % (ref 10–15)
ERYTHROCYTE [DISTWIDTH] IN BLOOD BY AUTOMATED COUNT: 18.4 % (ref 10–15)
ERYTHROCYTE [DISTWIDTH] IN BLOOD BY AUTOMATED COUNT: 18.5 % (ref 10–15)
ERYTHROCYTE [DISTWIDTH] IN BLOOD BY AUTOMATED COUNT: 18.6 % (ref 10–15)
ERYTHROCYTE [DISTWIDTH] IN BLOOD BY AUTOMATED COUNT: 18.6 % (ref 10–15)
ERYTHROCYTE [DISTWIDTH] IN BLOOD BY AUTOMATED COUNT: 18.7 % (ref 10–15)
ERYTHROCYTE [DISTWIDTH] IN BLOOD BY AUTOMATED COUNT: 18.9 % (ref 10–15)
ERYTHROCYTE [DISTWIDTH] IN BLOOD BY AUTOMATED COUNT: 19.3 % (ref 10–15)
ERYTHROCYTE [DISTWIDTH] IN BLOOD BY AUTOMATED COUNT: 19.3 % (ref 10–15)
ERYTHROCYTE [DISTWIDTH] IN BLOOD BY AUTOMATED COUNT: 19.5 % (ref 10–15)
ERYTHROCYTE [DISTWIDTH] IN BLOOD BY AUTOMATED COUNT: 19.6 % (ref 10–15)
ERYTHROCYTE [DISTWIDTH] IN BLOOD BY AUTOMATED COUNT: 19.9 % (ref 10–15)
ERYTHROCYTE [DISTWIDTH] IN BLOOD BY AUTOMATED COUNT: 20.1 % (ref 10–15)
ERYTHROCYTE [DISTWIDTH] IN BLOOD BY AUTOMATED COUNT: 20.2 % (ref 10–15)
ERYTHROCYTE [DISTWIDTH] IN BLOOD BY AUTOMATED COUNT: 20.6 % (ref 10–15)
ERYTHROCYTE [DISTWIDTH] IN BLOOD BY AUTOMATED COUNT: 21 % (ref 10–15)
ERYTHROCYTE [SEDIMENTATION RATE] IN BLOOD BY WESTERGREN METHOD: 10 MM/HR (ref 0–20)
FLUAV RNA SPEC QL NAA+PROBE: NEGATIVE
FLUBV RNA RESP QL NAA+PROBE: NEGATIVE
FRAGMENTS BLD QL SMEAR: ABNORMAL
GLUCOSE BLDC GLUCOMTR-MCNC: 103 MG/DL (ref 70–99)
GLUCOSE BLDC GLUCOMTR-MCNC: 118 MG/DL (ref 70–99)
GLUCOSE SERPL-MCNC: 100 MG/DL (ref 70–99)
GLUCOSE SERPL-MCNC: 105 MG/DL (ref 70–99)
GLUCOSE SERPL-MCNC: 114 MG/DL (ref 70–99)
GLUCOSE SERPL-MCNC: 118 MG/DL (ref 70–99)
GLUCOSE SERPL-MCNC: 72 MG/DL (ref 70–99)
GLUCOSE SERPL-MCNC: 74 MG/DL (ref 70–99)
GLUCOSE SERPL-MCNC: 77 MG/DL (ref 70–99)
GLUCOSE SERPL-MCNC: 79 MG/DL (ref 70–99)
GLUCOSE SERPL-MCNC: 82 MG/DL (ref 70–99)
GLUCOSE SERPL-MCNC: 83 MG/DL (ref 70–99)
GLUCOSE SERPL-MCNC: 84 MG/DL (ref 70–99)
GLUCOSE SERPL-MCNC: 89 MG/DL (ref 70–99)
GLUCOSE SERPL-MCNC: 90 MG/DL (ref 70–99)
GLUCOSE SERPL-MCNC: 91 MG/DL (ref 70–99)
GLUCOSE SERPL-MCNC: 92 MG/DL (ref 70–99)
GLUCOSE SERPL-MCNC: 95 MG/DL (ref 70–99)
GLUCOSE SERPL-MCNC: 96 MG/DL (ref 70–99)
GLUCOSE SERPL-MCNC: 96 MG/DL (ref 70–99)
GLUCOSE UR STRIP-MCNC: NEGATIVE MG/DL
HCO3 BLDV-SCNC: 21 MMOL/L (ref 21–28)
HCO3 BLDV-SCNC: 21 MMOL/L (ref 21–28)
HCO3 BLDV-SCNC: 22 MMOL/L (ref 21–28)
HCT VFR BLD AUTO: 24.3 % (ref 40–53)
HCT VFR BLD AUTO: 25.3 % (ref 40–53)
HCT VFR BLD AUTO: 27.1 % (ref 40–53)
HCT VFR BLD AUTO: 27.2 % (ref 40–53)
HCT VFR BLD AUTO: 27.2 % (ref 40–53)
HCT VFR BLD AUTO: 27.3 % (ref 40–53)
HCT VFR BLD AUTO: 29 % (ref 40–53)
HCT VFR BLD AUTO: 30.1 % (ref 40–53)
HCT VFR BLD AUTO: 30.9 % (ref 40–53)
HCT VFR BLD AUTO: 31 % (ref 40–53)
HCT VFR BLD AUTO: 31.1 % (ref 40–53)
HCT VFR BLD AUTO: 31.3 % (ref 40–53)
HCT VFR BLD AUTO: 32.1 % (ref 40–53)
HCT VFR BLD AUTO: 32.8 % (ref 40–53)
HCT VFR BLD AUTO: 35.2 % (ref 40–53)
HCT VFR BLD AUTO: 36 % (ref 40–53)
HCT VFR BLD AUTO: 36.4 % (ref 40–53)
HCT VFR BLD AUTO: 36.4 % (ref 40–53)
HCT VFR BLD AUTO: 37.4 % (ref 40–53)
HCT VFR BLD AUTO: 39.5 % (ref 40–53)
HGB BLD-MCNC: 10.2 G/DL (ref 13.3–17.7)
HGB BLD-MCNC: 10.2 G/DL (ref 13.3–17.7)
HGB BLD-MCNC: 10.3 G/DL (ref 13.3–17.7)
HGB BLD-MCNC: 10.6 G/DL (ref 13.3–17.7)
HGB BLD-MCNC: 11 G/DL (ref 13.3–17.7)
HGB BLD-MCNC: 11.7 G/DL (ref 13.3–17.7)
HGB BLD-MCNC: 11.7 G/DL (ref 13.3–17.7)
HGB BLD-MCNC: 11.8 G/DL (ref 13.3–17.7)
HGB BLD-MCNC: 12 G/DL (ref 13.3–17.7)
HGB BLD-MCNC: 12.5 G/DL (ref 13.3–17.7)
HGB BLD-MCNC: 6.7 G/DL (ref 13.3–17.7)
HGB BLD-MCNC: 6.9 G/DL (ref 13.3–17.7)
HGB BLD-MCNC: 7.1 G/DL (ref 13.3–17.7)
HGB BLD-MCNC: 7.3 G/DL (ref 13.3–17.7)
HGB BLD-MCNC: 7.4 G/DL (ref 13.3–17.7)
HGB BLD-MCNC: 7.5 G/DL (ref 13.3–17.7)
HGB BLD-MCNC: 7.6 G/DL (ref 13.3–17.7)
HGB BLD-MCNC: 7.7 G/DL (ref 13.3–17.7)
HGB BLD-MCNC: 7.9 G/DL (ref 13.3–17.7)
HGB BLD-MCNC: 8.1 G/DL (ref 13.3–17.7)
HGB BLD-MCNC: 8.1 G/DL (ref 13.3–17.7)
HGB BLD-MCNC: 8.2 G/DL (ref 13.3–17.7)
HGB BLD-MCNC: 8.2 G/DL (ref 13.3–17.7)
HGB BLD-MCNC: 8.3 G/DL (ref 13.3–17.7)
HGB BLD-MCNC: 8.5 G/DL (ref 13.3–17.7)
HGB BLD-MCNC: 8.6 G/DL (ref 13.3–17.7)
HGB BLD-MCNC: 8.6 G/DL (ref 13.3–17.7)
HGB BLD-MCNC: 8.7 G/DL (ref 13.3–17.7)
HGB BLD-MCNC: 9.1 G/DL (ref 13.3–17.7)
HGB BLD-MCNC: 9.3 G/DL (ref 13.3–17.7)
HGB BLD-MCNC: 9.5 G/DL (ref 13.3–17.7)
HGB BLD-MCNC: 9.9 G/DL (ref 13.3–17.7)
HGB C CRYSTALS: ABNORMAL
HGB UR QL STRIP: ABNORMAL
HGB UR QL STRIP: NEGATIVE
HGB UR QL STRIP: NEGATIVE
HOLD SPECIMEN: NORMAL
HOLD SPECIMEN: NORMAL
HOWELL-JOLLY BOD BLD QL SMEAR: ABNORMAL
IMM GRANULOCYTES # BLD: 0 10E3/UL
IMM GRANULOCYTES # BLD: 0.1 10E3/UL
IMM GRANULOCYTES NFR BLD: 0 %
IMM GRANULOCYTES NFR BLD: 1 %
IMM GRANULOCYTES NFR BLD: 1 %
INR PPP: 1.26 (ref 0.85–1.15)
INR PPP: 1.31 (ref 0.85–1.15)
ISSUE DATE AND TIME: NORMAL
ISSUE DATE AND TIME: NORMAL
KETONES UR STRIP-MCNC: NEGATIVE MG/DL
LACTATE SERPL-SCNC: 0.9 MMOL/L (ref 0.7–2)
LACTATE SERPL-SCNC: 1 MMOL/L (ref 0.7–2)
LACTATE SERPL-SCNC: 1.3 MMOL/L (ref 0.7–2)
LACTATE SERPL-SCNC: 1.9 MMOL/L (ref 0.7–2)
LACTATE SERPL-SCNC: 2.2 MMOL/L (ref 0.7–2)
LACTATE SERPL-SCNC: 2.4 MMOL/L (ref 0.7–2)
LACTATE SERPL-SCNC: 2.5 MMOL/L (ref 0.7–2)
LACTATE SERPL-SCNC: 2.9 MMOL/L (ref 0.7–2)
LEUKOCYTE ESTERASE UR QL STRIP: NEGATIVE
LIPASE SERPL-CCNC: 11 U/L (ref 13–60)
LYMPHOCYTES # BLD AUTO: 0.7 10E3/UL (ref 0.8–5.3)
LYMPHOCYTES # BLD AUTO: 0.8 10E3/UL (ref 0.8–5.3)
LYMPHOCYTES # BLD AUTO: 0.8 10E3/UL (ref 0.8–5.3)
LYMPHOCYTES # BLD AUTO: 0.9 10E3/UL (ref 0.8–5.3)
LYMPHOCYTES # BLD AUTO: 1 10E3/UL (ref 0.8–5.3)
LYMPHOCYTES # BLD AUTO: 1.2 10E3/UL (ref 0.8–5.3)
LYMPHOCYTES # BLD AUTO: 1.2 10E3/UL (ref 0.8–5.3)
LYMPHOCYTES # BLD AUTO: 1.4 10E3/UL (ref 0.8–5.3)
LYMPHOCYTES # BLD AUTO: 1.5 10E3/UL (ref 0–5.3)
LYMPHOCYTES # BLD AUTO: 1.8 10E3/UL (ref 0–5.3)
LYMPHOCYTES # BLD AUTO: 2.6 10E3/UL (ref 0.8–5.3)
LYMPHOCYTES NFR BLD AUTO: 12 %
LYMPHOCYTES NFR BLD AUTO: 15 %
LYMPHOCYTES NFR BLD AUTO: 16 %
LYMPHOCYTES NFR BLD AUTO: 17 %
LYMPHOCYTES NFR BLD AUTO: 18 %
LYMPHOCYTES NFR BLD AUTO: 20 %
LYMPHOCYTES NFR BLD AUTO: 20 %
LYMPHOCYTES NFR BLD AUTO: 21 %
LYMPHOCYTES NFR BLD AUTO: 21 %
LYMPHOCYTES NFR BLD AUTO: 22 %
LYMPHOCYTES NFR BLD AUTO: 22 %
LYMPHOCYTES NFR BLD AUTO: 24 %
LYMPHOCYTES NFR BLD AUTO: 30 %
LYMPHOCYTES NFR BLD AUTO: 7 %
MCH RBC QN AUTO: 26.1 PG (ref 26.5–33)
MCH RBC QN AUTO: 26.5 PG (ref 26.5–33)
MCH RBC QN AUTO: 26.6 PG (ref 26.5–33)
MCH RBC QN AUTO: 26.8 PG (ref 26.5–33)
MCH RBC QN AUTO: 26.8 PG (ref 26.5–33)
MCH RBC QN AUTO: 26.9 PG (ref 26.5–33)
MCH RBC QN AUTO: 27 PG (ref 26.5–33)
MCH RBC QN AUTO: 27.2 PG (ref 26.5–33)
MCH RBC QN AUTO: 27.8 PG (ref 26.5–33)
MCH RBC QN AUTO: 28.6 PG (ref 26.5–33)
MCH RBC QN AUTO: 28.6 PG (ref 26.5–33)
MCH RBC QN AUTO: 28.7 PG (ref 26.5–33)
MCH RBC QN AUTO: 28.9 PG (ref 26.5–33)
MCH RBC QN AUTO: 28.9 PG (ref 26.5–33)
MCH RBC QN AUTO: 29 PG (ref 26.5–33)
MCH RBC QN AUTO: 29.1 PG (ref 26.5–33)
MCHC RBC AUTO-ENTMCNC: 29.9 G/DL (ref 31.5–36.5)
MCHC RBC AUTO-ENTMCNC: 31.1 G/DL (ref 31.5–36.5)
MCHC RBC AUTO-ENTMCNC: 31.2 G/DL (ref 31.5–36.5)
MCHC RBC AUTO-ENTMCNC: 31.3 G/DL (ref 31.5–36.5)
MCHC RBC AUTO-ENTMCNC: 31.4 G/DL (ref 31.5–36.5)
MCHC RBC AUTO-ENTMCNC: 31.4 G/DL (ref 31.5–36.5)
MCHC RBC AUTO-ENTMCNC: 31.6 G/DL (ref 31.5–36.5)
MCHC RBC AUTO-ENTMCNC: 31.8 G/DL (ref 31.5–36.5)
MCHC RBC AUTO-ENTMCNC: 32 G/DL (ref 31.5–36.5)
MCHC RBC AUTO-ENTMCNC: 32.1 G/DL (ref 31.5–36.5)
MCHC RBC AUTO-ENTMCNC: 32.1 G/DL (ref 31.5–36.5)
MCHC RBC AUTO-ENTMCNC: 32.3 G/DL (ref 31.5–36.5)
MCHC RBC AUTO-ENTMCNC: 32.4 G/DL (ref 31.5–36.5)
MCHC RBC AUTO-ENTMCNC: 32.5 G/DL (ref 31.5–36.5)
MCHC RBC AUTO-ENTMCNC: 32.9 G/DL (ref 31.5–36.5)
MCHC RBC AUTO-ENTMCNC: 32.9 G/DL (ref 31.5–36.5)
MCV RBC AUTO: 83 FL (ref 78–100)
MCV RBC AUTO: 83 FL (ref 78–100)
MCV RBC AUTO: 84 FL (ref 78–100)
MCV RBC AUTO: 84 FL (ref 78–100)
MCV RBC AUTO: 85 FL (ref 78–100)
MCV RBC AUTO: 86 FL (ref 78–100)
MCV RBC AUTO: 86 FL (ref 78–100)
MCV RBC AUTO: 87 FL (ref 78–100)
MCV RBC AUTO: 87 FL (ref 78–100)
MCV RBC AUTO: 88 FL (ref 78–100)
MCV RBC AUTO: 88 FL (ref 78–100)
MCV RBC AUTO: 89 FL (ref 78–100)
MCV RBC AUTO: 89 FL (ref 78–100)
MCV RBC AUTO: 90 FL (ref 78–100)
MCV RBC AUTO: 92 FL (ref 78–100)
MONOCYTES # BLD AUTO: 0.3 10E3/UL (ref 0–1.3)
MONOCYTES # BLD AUTO: 0.5 10E3/UL (ref 0–1.3)
MONOCYTES # BLD AUTO: 0.5 10E3/UL (ref 0–1.3)
MONOCYTES # BLD AUTO: 0.6 10E3/UL (ref 0–1.3)
MONOCYTES # BLD AUTO: 0.7 10E3/UL (ref 0–1.3)
MONOCYTES # BLD AUTO: 0.7 10E3/UL (ref 0–1.3)
MONOCYTES # BLD AUTO: 0.8 10E3/UL (ref 0–1.3)
MONOCYTES # BLD AUTO: 0.9 10E3/UL (ref 0–1.3)
MONOCYTES # BLD AUTO: 0.9 10E3/UL (ref 0–1.3)
MONOCYTES # BLD AUTO: 1.1 10E3/UL (ref 0–1.3)
MONOCYTES # BLD AUTO: 1.3 10E3/UL (ref 0–1.3)
MONOCYTES NFR BLD AUTO: 11 %
MONOCYTES NFR BLD AUTO: 12 %
MONOCYTES NFR BLD AUTO: 13 %
MONOCYTES NFR BLD AUTO: 13 %
MONOCYTES NFR BLD AUTO: 14 %
MONOCYTES NFR BLD AUTO: 18 %
MONOCYTES NFR BLD AUTO: 19 %
MONOCYTES NFR BLD AUTO: 19 %
MONOCYTES NFR BLD AUTO: 3 %
MONOCYTES NFR BLD AUTO: 5 %
MONOCYTES NFR BLD AUTO: 6 %
MUCOUS THREADS #/AREA URNS LPF: PRESENT /LPF
NEUTROPHILS # BLD AUTO: 1.7 10E3/UL (ref 1.6–8.3)
NEUTROPHILS # BLD AUTO: 2.5 10E3/UL (ref 1.6–8.3)
NEUTROPHILS # BLD AUTO: 2.6 10E3/UL (ref 1.6–8.3)
NEUTROPHILS # BLD AUTO: 2.7 10E3/UL (ref 1.6–8.3)
NEUTROPHILS # BLD AUTO: 3.1 10E3/UL (ref 1.6–8.3)
NEUTROPHILS # BLD AUTO: 3.1 10E3/UL (ref 1.6–8.3)
NEUTROPHILS # BLD AUTO: 3.3 10E3/UL (ref 1.6–8.3)
NEUTROPHILS # BLD AUTO: 4 10E3/UL (ref 1.6–8.3)
NEUTROPHILS # BLD AUTO: 4.6 10E3/UL (ref 1.6–8.3)
NEUTROPHILS # BLD AUTO: 4.8 10E3/UL (ref 1.6–8.3)
NEUTROPHILS # BLD AUTO: 6.2 10E3/UL (ref 1.6–8.3)
NEUTROPHILS # BLD AUTO: 8 10E3/UL (ref 1.6–8.3)
NEUTROPHILS # BLD AUTO: 8.5 10E3/UL (ref 1.6–8.3)
NEUTROPHILS # BLD AUTO: 8.8 10E3/UL (ref 1.6–8.3)
NEUTROPHILS NFR BLD AUTO: 52 %
NEUTROPHILS NFR BLD AUTO: 59 %
NEUTROPHILS NFR BLD AUTO: 60 %
NEUTROPHILS NFR BLD AUTO: 63 %
NEUTROPHILS NFR BLD AUTO: 63 %
NEUTROPHILS NFR BLD AUTO: 65 %
NEUTROPHILS NFR BLD AUTO: 67 %
NEUTROPHILS NFR BLD AUTO: 68 %
NEUTROPHILS NFR BLD AUTO: 69 %
NEUTROPHILS NFR BLD AUTO: 70 %
NEUTROPHILS NFR BLD AUTO: 71 %
NEUTROPHILS NFR BLD AUTO: 71 %
NEUTROPHILS NFR BLD AUTO: 80 %
NEUTROPHILS NFR BLD AUTO: 88 %
NEUTS HYPERSEG BLD QL SMEAR: ABNORMAL
NITRATE UR QL: NEGATIVE
NRBC # BLD AUTO: 0 10E3/UL
NRBC BLD AUTO-RTO: 0 /100
O2/TOTAL GAS SETTING VFR VENT: 21 %
OXYHGB MFR BLDV: 62 % (ref 70–75)
OXYHGB MFR BLDV: 67 % (ref 70–75)
OXYHGB MFR BLDV: 69 % (ref 70–75)
PCO2 BLDV: 38 MM HG (ref 40–50)
PCO2 BLDV: 38 MM HG (ref 40–50)
PCO2 BLDV: 39 MM HG (ref 40–50)
PH BLDV: 7.34 [PH] (ref 7.32–7.43)
PH BLDV: 7.35 [PH] (ref 7.32–7.43)
PH BLDV: 7.37 [PH] (ref 7.32–7.43)
PH UR STRIP: 6 [PH] (ref 5–7)
PH UR STRIP: 6.5 [PH] (ref 5–7)
PH UR STRIP: 7 [PH] (ref 5–7)
PLAT MORPH BLD: ABNORMAL
PLATELET # BLD AUTO: 108 10E3/UL (ref 150–450)
PLATELET # BLD AUTO: 114 10E3/UL (ref 150–450)
PLATELET # BLD AUTO: 124 10E3/UL (ref 150–450)
PLATELET # BLD AUTO: 126 10E3/UL (ref 150–450)
PLATELET # BLD AUTO: 128 10E3/UL (ref 150–450)
PLATELET # BLD AUTO: 141 10E3/UL (ref 150–450)
PLATELET # BLD AUTO: 144 10E3/UL (ref 150–450)
PLATELET # BLD AUTO: 145 10E3/UL (ref 150–450)
PLATELET # BLD AUTO: 147 10E3/UL (ref 150–450)
PLATELET # BLD AUTO: 149 10E3/UL (ref 150–450)
PLATELET # BLD AUTO: 150 10E3/UL (ref 150–450)
PLATELET # BLD AUTO: 151 10E3/UL (ref 150–450)
PLATELET # BLD AUTO: 157 10E3/UL (ref 150–450)
PLATELET # BLD AUTO: 160 10E3/UL (ref 150–450)
PLATELET # BLD AUTO: 161 10E3/UL (ref 150–450)
PLATELET # BLD AUTO: 163 10E3/UL (ref 150–450)
PLATELET # BLD AUTO: 180 10E3/UL (ref 150–450)
PLATELET # BLD AUTO: 180 10E3/UL (ref 150–450)
PLATELET # BLD AUTO: 197 10E3/UL (ref 150–450)
PLATELET # BLD AUTO: 199 10E3/UL (ref 150–450)
PLATELET # BLD AUTO: 206 10E3/UL (ref 150–450)
PLATELET # BLD AUTO: 207 10E3/UL (ref 150–450)
PLATELET # BLD AUTO: 211 10E3/UL (ref 150–450)
PLATELET # BLD AUTO: 99 10E3/UL (ref 150–450)
PO2 BLDV: 36 MM HG (ref 25–47)
PO2 BLDV: 39 MM HG (ref 25–47)
PO2 BLDV: 39 MM HG (ref 25–47)
POLYCHROMASIA BLD QL SMEAR: ABNORMAL
POTASSIUM SERPL-SCNC: 3.3 MMOL/L (ref 3.4–5.3)
POTASSIUM SERPL-SCNC: 3.4 MMOL/L (ref 3.4–5.3)
POTASSIUM SERPL-SCNC: 3.5 MMOL/L (ref 3.4–5.3)
POTASSIUM SERPL-SCNC: 3.5 MMOL/L (ref 3.4–5.3)
POTASSIUM SERPL-SCNC: 3.6 MMOL/L (ref 3.4–5.3)
POTASSIUM SERPL-SCNC: 3.7 MMOL/L (ref 3.4–5.3)
POTASSIUM SERPL-SCNC: 3.8 MMOL/L (ref 3.4–5.3)
POTASSIUM SERPL-SCNC: 3.9 MMOL/L (ref 3.4–5.3)
POTASSIUM SERPL-SCNC: 4 MMOL/L (ref 3.4–5.3)
POTASSIUM SERPL-SCNC: 4.1 MMOL/L (ref 3.4–5.3)
POTASSIUM SERPL-SCNC: 4.3 MMOL/L (ref 3.4–5.3)
POTASSIUM SERPL-SCNC: 4.5 MMOL/L (ref 3.4–5.3)
POTASSIUM SERPL-SCNC: 4.6 MMOL/L (ref 3.4–5.3)
POTASSIUM SERPL-SCNC: 4.9 MMOL/L (ref 3.4–5.3)
PROCALCITONIN SERPL IA-MCNC: 0.37 NG/ML
PROT SERPL-MCNC: 5.1 G/DL (ref 6.4–8.3)
PROT SERPL-MCNC: 5.2 G/DL (ref 6.4–8.3)
PROT SERPL-MCNC: 5.3 G/DL (ref 6.4–8.3)
PROT SERPL-MCNC: 5.4 G/DL (ref 6.4–8.3)
PROT SERPL-MCNC: 5.7 G/DL (ref 6.4–8.3)
PROT SERPL-MCNC: 6.9 G/DL (ref 6.4–8.3)
PROT SERPL-MCNC: 7 G/DL (ref 6.4–8.3)
PROT SERPL-MCNC: 7.2 G/DL (ref 6.4–8.3)
PROT SERPL-MCNC: 7.3 G/DL (ref 6.4–8.3)
RBC # BLD AUTO: 2.79 10E6/UL (ref 4.4–5.9)
RBC # BLD AUTO: 2.93 10E6/UL (ref 4.4–5.9)
RBC # BLD AUTO: 3.19 10E6/UL (ref 4.4–5.9)
RBC # BLD AUTO: 3.2 10E6/UL (ref 4.4–5.9)
RBC # BLD AUTO: 3.23 10E6/UL (ref 4.4–5.9)
RBC # BLD AUTO: 3.26 10E6/UL (ref 4.4–5.9)
RBC # BLD AUTO: 3.27 10E6/UL (ref 4.4–5.9)
RBC # BLD AUTO: 3.29 10E6/UL (ref 4.4–5.9)
RBC # BLD AUTO: 3.46 10E6/UL (ref 4.4–5.9)
RBC # BLD AUTO: 3.55 10E6/UL (ref 4.4–5.9)
RBC # BLD AUTO: 3.55 10E6/UL (ref 4.4–5.9)
RBC # BLD AUTO: 3.7 10E6/UL (ref 4.4–5.9)
RBC # BLD AUTO: 3.73 10E6/UL (ref 4.4–5.9)
RBC # BLD AUTO: 3.8 10E6/UL (ref 4.4–5.9)
RBC # BLD AUTO: 4.02 10E6/UL (ref 4.4–5.9)
RBC # BLD AUTO: 4.05 10E6/UL (ref 4.4–5.9)
RBC # BLD AUTO: 4.11 10E6/UL (ref 4.4–5.9)
RBC # BLD AUTO: 4.13 10E6/UL (ref 4.4–5.9)
RBC # BLD AUTO: 4.46 10E6/UL (ref 4.4–5.9)
RBC # BLD AUTO: 4.64 10E6/UL (ref 4.4–5.9)
RBC AGGLUT BLD QL: ABNORMAL
RBC MORPH BLD: ABNORMAL
RBC URINE: 14 /HPF
RBC URINE: <1 /HPF
RBC URINE: <1 /HPF
ROULEAUX BLD QL SMEAR: ABNORMAL
RSV RNA SPEC NAA+PROBE: NEGATIVE
SAO2 % BLDV: 63.8 % (ref 70–75)
SAO2 % BLDV: 69.2 % (ref 70–75)
SAO2 % BLDV: 70.3 % (ref 70–75)
SARS-COV-2 RNA RESP QL NAA+PROBE: NEGATIVE
SICKLE CELLS BLD QL SMEAR: ABNORMAL
SMUDGE CELLS BLD QL SMEAR: ABNORMAL
SODIUM SERPL-SCNC: 128 MMOL/L (ref 135–145)
SODIUM SERPL-SCNC: 130 MMOL/L (ref 135–145)
SODIUM SERPL-SCNC: 131 MMOL/L (ref 135–145)
SODIUM SERPL-SCNC: 132 MMOL/L (ref 135–145)
SODIUM SERPL-SCNC: 135 MMOL/L (ref 135–145)
SODIUM SERPL-SCNC: 135 MMOL/L (ref 135–145)
SODIUM SERPL-SCNC: 136 MMOL/L (ref 135–145)
SODIUM SERPL-SCNC: 137 MMOL/L (ref 135–145)
SODIUM SERPL-SCNC: 137 MMOL/L (ref 135–145)
SODIUM SERPL-SCNC: 139 MMOL/L (ref 135–145)
SODIUM SERPL-SCNC: 140 MMOL/L (ref 135–145)
SODIUM SERPL-SCNC: 141 MMOL/L (ref 135–145)
SODIUM SERPL-SCNC: 142 MMOL/L (ref 135–145)
SODIUM UR-SCNC: <20 MMOL/L
SP GR UR STRIP: 1.01 (ref 1–1.03)
SP GR UR STRIP: 1.02 (ref 1–1.03)
SP GR UR STRIP: 1.04 (ref 1–1.03)
SPECIMEN EXPIRATION DATE: NORMAL
SPHEROCYTES BLD QL SMEAR: ABNORMAL
SQUAMOUS EPITHELIAL: <1 /HPF
STOMATOCYTES BLD QL SMEAR: ABNORMAL
T4 FREE SERPL-MCNC: 0.81 NG/DL (ref 0.9–1.7)
T4 FREE SERPL-MCNC: 1.21 NG/DL (ref 0.9–1.7)
TARGETS BLD QL SMEAR: ABNORMAL
TOXIC GRANULES BLD QL SMEAR: ABNORMAL
TSH SERPL DL<=0.005 MIU/L-ACNC: 0.03 UIU/ML (ref 0.3–4.2)
TSH SERPL DL<=0.005 MIU/L-ACNC: 11.72 UIU/ML (ref 0.3–4.2)
UFH PPP CHRO-ACNC: 0.38 IU/ML
UFH PPP CHRO-ACNC: <0.1 IU/ML
UFH PPP CHRO-ACNC: <0.1 IU/ML
UNIT ABO/RH: NORMAL
UNIT ABO/RH: NORMAL
UNIT NUMBER: NORMAL
UNIT NUMBER: NORMAL
UNIT STATUS: NORMAL
UNIT STATUS: NORMAL
UNIT TYPE ISBT: 5100
UNIT TYPE ISBT: 5100
UROBILINOGEN UR STRIP-MCNC: NORMAL MG/DL
VANCOMYCIN SERPL-MCNC: 10 UG/ML
VANCOMYCIN SERPL-MCNC: 11.2 UG/ML
VANCOMYCIN SERPL-MCNC: 12.2 UG/ML
VANCOMYCIN SERPL-MCNC: 14.7 UG/ML
VANCOMYCIN SERPL-MCNC: 15.7 UG/ML
VANCOMYCIN SERPL-MCNC: 17.3 UG/ML
VANCOMYCIN SERPL-MCNC: 7.7 UG/ML
VANCOMYCIN SERPL-MCNC: <4 UG/ML
VANCOMYCIN SERPL-MCNC: <4 UG/ML
VARIANT LYMPHS BLD QL SMEAR: PRESENT
WBC # BLD AUTO: 12.4 10E3/UL (ref 4–11)
WBC # BLD AUTO: 3.3 10E3/UL (ref 4–11)
WBC # BLD AUTO: 4 10E3/UL (ref 4–11)
WBC # BLD AUTO: 4.1 10E3/UL (ref 4–11)
WBC # BLD AUTO: 4.3 10E3/UL (ref 4–11)
WBC # BLD AUTO: 4.5 10E3/UL (ref 4–11)
WBC # BLD AUTO: 4.6 10E3/UL (ref 4–11)
WBC # BLD AUTO: 4.6 10E3/UL (ref 4–11)
WBC # BLD AUTO: 5 10E3/UL (ref 4–11)
WBC # BLD AUTO: 5.3 10E3/UL (ref 4–11)
WBC # BLD AUTO: 5.3 10E3/UL (ref 4–11)
WBC # BLD AUTO: 5.6 10E3/UL (ref 4–11)
WBC # BLD AUTO: 6.8 10E3/UL (ref 4–11)
WBC # BLD AUTO: 7 10E3/UL (ref 4–11)
WBC # BLD AUTO: 7 10E3/UL (ref 4–11)
WBC # BLD AUTO: 7.2 10E3/UL (ref 4–11)
WBC # BLD AUTO: 8.7 10E3/UL (ref 4–11)
WBC # BLD AUTO: 8.9 10E3/UL (ref 4–11)
WBC # BLD AUTO: 9.7 10E3/UL (ref 4–11)
WBC # BLD AUTO: 9.9 10E3/UL (ref 4–11)
WBC URINE: 0 /HPF
WBC URINE: 0 /HPF
WBC URINE: 1 /HPF

## 2024-01-01 PROCEDURE — 36415 COLL VENOUS BLD VENIPUNCTURE: CPT

## 2024-01-01 PROCEDURE — 99232 SBSQ HOSP IP/OBS MODERATE 35: CPT | Performed by: PEDIATRICS

## 2024-01-01 PROCEDURE — 250N000011 HC RX IP 250 OP 636: Performed by: STUDENT IN AN ORGANIZED HEALTH CARE EDUCATION/TRAINING PROGRAM

## 2024-01-01 PROCEDURE — 99285 EMERGENCY DEPT VISIT HI MDM: CPT | Performed by: EMERGENCY MEDICINE

## 2024-01-01 PROCEDURE — 258N000003 HC RX IP 258 OP 636: Performed by: PHYSICIAN ASSISTANT

## 2024-01-01 PROCEDURE — 81001 URINALYSIS AUTO W/SCOPE: CPT | Performed by: NURSE PRACTITIONER

## 2024-01-01 PROCEDURE — 83605 ASSAY OF LACTIC ACID: CPT | Performed by: STUDENT IN AN ORGANIZED HEALTH CARE EDUCATION/TRAINING PROGRAM

## 2024-01-01 PROCEDURE — 250N000011 HC RX IP 250 OP 636: Performed by: FAMILY MEDICINE

## 2024-01-01 PROCEDURE — 80202 ASSAY OF VANCOMYCIN: CPT

## 2024-01-01 PROCEDURE — 85018 HEMOGLOBIN: CPT | Performed by: INTERNAL MEDICINE

## 2024-01-01 PROCEDURE — 250N000012 HC RX MED GY IP 250 OP 636 PS 637: Performed by: INTERNAL MEDICINE

## 2024-01-01 PROCEDURE — 96374 THER/PROPH/DIAG INJ IV PUSH: CPT | Performed by: PHYSICIAN ASSISTANT

## 2024-01-01 PROCEDURE — 83605 ASSAY OF LACTIC ACID: CPT | Performed by: HOSPITALIST

## 2024-01-01 PROCEDURE — 85027 COMPLETE CBC AUTOMATED: CPT | Performed by: HOSPITALIST

## 2024-01-01 PROCEDURE — 250N000011 HC RX IP 250 OP 636: Performed by: HOSPITALIST

## 2024-01-01 PROCEDURE — 250N000011 HC RX IP 250 OP 636: Performed by: PHYSICIAN ASSISTANT

## 2024-01-01 PROCEDURE — 250N000009 HC RX 250: Performed by: HOSPITALIST

## 2024-01-01 PROCEDURE — 82565 ASSAY OF CREATININE: CPT

## 2024-01-01 PROCEDURE — 250N000011 HC RX IP 250 OP 636: Performed by: INTERNAL MEDICINE

## 2024-01-01 PROCEDURE — 74330 X-RAY BILE/PANC ENDOSCOPY: CPT

## 2024-01-01 PROCEDURE — 85025 COMPLETE CBC W/AUTO DIFF WBC: CPT | Performed by: PHYSICIAN ASSISTANT

## 2024-01-01 PROCEDURE — 93971 EXTREMITY STUDY: CPT | Mod: LT

## 2024-01-01 PROCEDURE — 250N000012 HC RX MED GY IP 250 OP 636 PS 637: Performed by: SPECIALIST

## 2024-01-01 PROCEDURE — 86140 C-REACTIVE PROTEIN: CPT | Performed by: STUDENT IN AN ORGANIZED HEALTH CARE EDUCATION/TRAINING PROGRAM

## 2024-01-01 PROCEDURE — 80048 BASIC METABOLIC PNL TOTAL CA: CPT | Performed by: HOSPITALIST

## 2024-01-01 PROCEDURE — 258N000003 HC RX IP 258 OP 636: Performed by: HOSPITALIST

## 2024-01-01 PROCEDURE — 250N000013 HC RX MED GY IP 250 OP 250 PS 637: Performed by: INTERNAL MEDICINE

## 2024-01-01 PROCEDURE — 250N000013 HC RX MED GY IP 250 OP 250 PS 637: Performed by: PEDIATRICS

## 2024-01-01 PROCEDURE — 710N000012 HC RECOVERY PHASE 2, PER MINUTE: Performed by: INTERNAL MEDICINE

## 2024-01-01 PROCEDURE — 99418 PROLNG IP/OBS E/M EA 15 MIN: CPT | Performed by: PEDIATRICS

## 2024-01-01 PROCEDURE — 36415 COLL VENOUS BLD VENIPUNCTURE: CPT | Performed by: STUDENT IN AN ORGANIZED HEALTH CARE EDUCATION/TRAINING PROGRAM

## 2024-01-01 PROCEDURE — 85048 AUTOMATED LEUKOCYTE COUNT: CPT | Performed by: EMERGENCY MEDICINE

## 2024-01-01 PROCEDURE — 250N000011 HC RX IP 250 OP 636: Performed by: EMERGENCY MEDICINE

## 2024-01-01 PROCEDURE — 85730 THROMBOPLASTIN TIME PARTIAL: CPT | Performed by: INTERNAL MEDICINE

## 2024-01-01 PROCEDURE — 85049 AUTOMATED PLATELET COUNT: CPT | Performed by: PEDIATRICS

## 2024-01-01 PROCEDURE — 84443 ASSAY THYROID STIM HORMONE: CPT

## 2024-01-01 PROCEDURE — 86923 COMPATIBILITY TEST ELECTRIC: CPT | Performed by: FAMILY MEDICINE

## 2024-01-01 PROCEDURE — 250N000011 HC RX IP 250 OP 636: Performed by: PEDIATRICS

## 2024-01-01 PROCEDURE — 85520 HEPARIN ASSAY: CPT | Performed by: PEDIATRICS

## 2024-01-01 PROCEDURE — 85018 HEMOGLOBIN: CPT | Performed by: EMERGENCY MEDICINE

## 2024-01-01 PROCEDURE — 258N000003 HC RX IP 258 OP 636: Performed by: PEDIATRICS

## 2024-01-01 PROCEDURE — 120N000001 HC R&B MED SURG/OB

## 2024-01-01 PROCEDURE — 85027 COMPLETE CBC AUTOMATED: CPT | Performed by: STUDENT IN AN ORGANIZED HEALTH CARE EDUCATION/TRAINING PROGRAM

## 2024-01-01 PROCEDURE — 36569 INSJ PICC 5 YR+ W/O IMAGING: CPT

## 2024-01-01 PROCEDURE — 85018 HEMOGLOBIN: CPT | Performed by: FAMILY MEDICINE

## 2024-01-01 PROCEDURE — 250N000025 HC SEVOFLURANE, PER MIN: Performed by: INTERNAL MEDICINE

## 2024-01-01 PROCEDURE — 85025 COMPLETE CBC W/AUTO DIFF WBC: CPT

## 2024-01-01 PROCEDURE — G0378 HOSPITAL OBSERVATION PER HR: HCPCS

## 2024-01-01 PROCEDURE — 96376 TX/PRO/DX INJ SAME DRUG ADON: CPT

## 2024-01-01 PROCEDURE — 99418 PROLNG IP/OBS E/M EA 15 MIN: CPT | Mod: AI | Performed by: HOSPITALIST

## 2024-01-01 PROCEDURE — 85004 AUTOMATED DIFF WBC COUNT: CPT | Performed by: STUDENT IN AN ORGANIZED HEALTH CARE EDUCATION/TRAINING PROGRAM

## 2024-01-01 PROCEDURE — 80202 ASSAY OF VANCOMYCIN: CPT | Performed by: FAMILY MEDICINE

## 2024-01-01 PROCEDURE — 370N000017 HC ANESTHESIA TECHNICAL FEE, PER MIN: Performed by: INTERNAL MEDICINE

## 2024-01-01 PROCEDURE — 84439 ASSAY OF FREE THYROXINE: CPT

## 2024-01-01 PROCEDURE — 71046 X-RAY EXAM CHEST 2 VIEWS: CPT

## 2024-01-01 PROCEDURE — 250N000013 HC RX MED GY IP 250 OP 250 PS 637: Performed by: STUDENT IN AN ORGANIZED HEALTH CARE EDUCATION/TRAINING PROGRAM

## 2024-01-01 PROCEDURE — 84460 ALANINE AMINO (ALT) (SGPT): CPT

## 2024-01-01 PROCEDURE — 250N000013 HC RX MED GY IP 250 OP 250 PS 637: Performed by: HOSPITALIST

## 2024-01-01 PROCEDURE — 99285 EMERGENCY DEPT VISIT HI MDM: CPT | Mod: 25

## 2024-01-01 PROCEDURE — 85004 AUTOMATED DIFF WBC COUNT: CPT

## 2024-01-01 PROCEDURE — 250N000013 HC RX MED GY IP 250 OP 250 PS 637: Performed by: FAMILY MEDICINE

## 2024-01-01 PROCEDURE — 96375 TX/PRO/DX INJ NEW DRUG ADDON: CPT

## 2024-01-01 PROCEDURE — 99233 SBSQ HOSP IP/OBS HIGH 50: CPT | Performed by: PEDIATRICS

## 2024-01-01 PROCEDURE — 84132 ASSAY OF SERUM POTASSIUM: CPT | Performed by: INTERNAL MEDICINE

## 2024-01-01 PROCEDURE — 99222 1ST HOSP IP/OBS MODERATE 55: CPT | Performed by: SPECIALIST

## 2024-01-01 PROCEDURE — 258N000003 HC RX IP 258 OP 636: Performed by: FAMILY MEDICINE

## 2024-01-01 PROCEDURE — 710N000009 HC RECOVERY PHASE 1, LEVEL 1, PER MIN: Performed by: INTERNAL MEDICINE

## 2024-01-01 PROCEDURE — 258N000003 HC RX IP 258 OP 636: Performed by: STUDENT IN AN ORGANIZED HEALTH CARE EDUCATION/TRAINING PROGRAM

## 2024-01-01 PROCEDURE — 96367 TX/PROPH/DG ADDL SEQ IV INF: CPT

## 2024-01-01 PROCEDURE — 84300 ASSAY OF URINE SODIUM: CPT | Performed by: HOSPITALIST

## 2024-01-01 PROCEDURE — 85730 THROMBOPLASTIN TIME PARTIAL: CPT | Performed by: FAMILY MEDICINE

## 2024-01-01 PROCEDURE — 99233 SBSQ HOSP IP/OBS HIGH 50: CPT | Performed by: INTERNAL MEDICINE

## 2024-01-01 PROCEDURE — 36415 COLL VENOUS BLD VENIPUNCTURE: CPT | Performed by: EMERGENCY MEDICINE

## 2024-01-01 PROCEDURE — 96365 THER/PROPH/DIAG IV INF INIT: CPT

## 2024-01-01 PROCEDURE — 99223 1ST HOSP IP/OBS HIGH 75: CPT | Mod: AI | Performed by: INTERNAL MEDICINE

## 2024-01-01 PROCEDURE — 272N000001 HC OR GENERAL SUPPLY STERILE: Performed by: INTERNAL MEDICINE

## 2024-01-01 PROCEDURE — 93000 ELECTROCARDIOGRAM COMPLETE: CPT | Performed by: STUDENT IN AN ORGANIZED HEALTH CARE EDUCATION/TRAINING PROGRAM

## 2024-01-01 PROCEDURE — 83605 ASSAY OF LACTIC ACID: CPT | Performed by: EMERGENCY MEDICINE

## 2024-01-01 PROCEDURE — 84075 ASSAY ALKALINE PHOSPHATASE: CPT | Performed by: PHYSICIAN ASSISTANT

## 2024-01-01 PROCEDURE — P9045 ALBUMIN (HUMAN), 5%, 250 ML: HCPCS | Mod: JZ | Performed by: INTERNAL MEDICINE

## 2024-01-01 PROCEDURE — 80048 BASIC METABOLIC PNL TOTAL CA: CPT | Performed by: PEDIATRICS

## 2024-01-01 PROCEDURE — 84075 ASSAY ALKALINE PHOSPHATASE: CPT

## 2024-01-01 PROCEDURE — 250N000009 HC RX 250: Performed by: ANESTHESIOLOGY

## 2024-01-01 PROCEDURE — 85730 THROMBOPLASTIN TIME PARTIAL: CPT | Performed by: PEDIATRICS

## 2024-01-01 PROCEDURE — 96375 TX/PRO/DX INJ NEW DRUG ADDON: CPT | Mod: 59 | Performed by: EMERGENCY MEDICINE

## 2024-01-01 PROCEDURE — 99233 SBSQ HOSP IP/OBS HIGH 50: CPT | Performed by: STUDENT IN AN ORGANIZED HEALTH CARE EDUCATION/TRAINING PROGRAM

## 2024-01-01 PROCEDURE — 80202 ASSAY OF VANCOMYCIN: CPT | Performed by: INTERNAL MEDICINE

## 2024-01-01 PROCEDURE — 99214 OFFICE O/P EST MOD 30 MIN: CPT | Performed by: STUDENT IN AN ORGANIZED HEALTH CARE EDUCATION/TRAINING PROGRAM

## 2024-01-01 PROCEDURE — 71260 CT THORAX DX C+: CPT | Mod: MG

## 2024-01-01 PROCEDURE — 36592 COLLECT BLOOD FROM PICC: CPT

## 2024-01-01 PROCEDURE — 80048 BASIC METABOLIC PNL TOTAL CA: CPT | Performed by: FAMILY MEDICINE

## 2024-01-01 PROCEDURE — 272N000450 HC KIT 4FR POWER PICC SINGLE LUMEN

## 2024-01-01 PROCEDURE — 85025 COMPLETE CBC W/AUTO DIFF WBC: CPT | Performed by: INTERNAL MEDICINE

## 2024-01-01 PROCEDURE — 85049 AUTOMATED PLATELET COUNT: CPT | Performed by: INTERNAL MEDICINE

## 2024-01-01 PROCEDURE — 250N000009 HC RX 250: Performed by: STUDENT IN AN ORGANIZED HEALTH CARE EDUCATION/TRAINING PROGRAM

## 2024-01-01 PROCEDURE — 99239 HOSP IP/OBS DSCHRG MGMT >30: CPT | Performed by: INTERNAL MEDICINE

## 2024-01-01 PROCEDURE — 80053 COMPREHEN METABOLIC PANEL: CPT | Performed by: INTERNAL MEDICINE

## 2024-01-01 PROCEDURE — 250N000013 HC RX MED GY IP 250 OP 250 PS 637: Performed by: EMERGENCY MEDICINE

## 2024-01-01 PROCEDURE — 82565 ASSAY OF CREATININE: CPT | Performed by: PHYSICIAN ASSISTANT

## 2024-01-01 PROCEDURE — 99232 SBSQ HOSP IP/OBS MODERATE 35: CPT | Performed by: SPECIALIST

## 2024-01-01 PROCEDURE — 85004 AUTOMATED DIFF WBC COUNT: CPT | Performed by: INTERNAL MEDICINE

## 2024-01-01 PROCEDURE — 85027 COMPLETE CBC AUTOMATED: CPT | Performed by: FAMILY MEDICINE

## 2024-01-01 PROCEDURE — 85610 PROTHROMBIN TIME: CPT | Performed by: PEDIATRICS

## 2024-01-01 PROCEDURE — 250N000009 HC RX 250: Performed by: SPECIALIST

## 2024-01-01 PROCEDURE — 99285 EMERGENCY DEPT VISIT HI MDM: CPT | Performed by: STUDENT IN AN ORGANIZED HEALTH CARE EDUCATION/TRAINING PROGRAM

## 2024-01-01 PROCEDURE — 85049 AUTOMATED PLATELET COUNT: CPT

## 2024-01-01 PROCEDURE — 36415 COLL VENOUS BLD VENIPUNCTURE: CPT | Performed by: HOSPITALIST

## 2024-01-01 PROCEDURE — 99233 SBSQ HOSP IP/OBS HIGH 50: CPT | Performed by: SPECIALIST

## 2024-01-01 PROCEDURE — 74177 CT ABD & PELVIS W/CONTRAST: CPT

## 2024-01-01 PROCEDURE — 80051 ELECTROLYTE PANEL: CPT | Performed by: PEDIATRICS

## 2024-01-01 PROCEDURE — 85018 HEMOGLOBIN: CPT | Performed by: PEDIATRICS

## 2024-01-01 PROCEDURE — 258N000003 HC RX IP 258 OP 636: Performed by: NURSE PRACTITIONER

## 2024-01-01 PROCEDURE — 84145 PROCALCITONIN (PCT): CPT | Performed by: STUDENT IN AN ORGANIZED HEALTH CARE EDUCATION/TRAINING PROGRAM

## 2024-01-01 PROCEDURE — 255N000002 HC RX 255 OP 636: Performed by: INTERNAL MEDICINE

## 2024-01-01 PROCEDURE — 82805 BLOOD GASES W/O2 SATURATION: CPT | Performed by: PEDIATRICS

## 2024-01-01 PROCEDURE — 99213 OFFICE O/P EST LOW 20 MIN: CPT | Mod: 25 | Performed by: UROLOGY

## 2024-01-01 PROCEDURE — 99284 EMERGENCY DEPT VISIT MOD MDM: CPT | Mod: 25 | Performed by: FAMILY MEDICINE

## 2024-01-01 PROCEDURE — 99283 EMERGENCY DEPT VISIT LOW MDM: CPT | Performed by: NURSE PRACTITIONER

## 2024-01-01 PROCEDURE — 250N000011 HC RX IP 250 OP 636: Mod: JZ | Performed by: STUDENT IN AN ORGANIZED HEALTH CARE EDUCATION/TRAINING PROGRAM

## 2024-01-01 PROCEDURE — 84075 ASSAY ALKALINE PHOSPHATASE: CPT | Performed by: STUDENT IN AN ORGANIZED HEALTH CARE EDUCATION/TRAINING PROGRAM

## 2024-01-01 PROCEDURE — 73030 X-RAY EXAM OF SHOULDER: CPT | Mod: LT

## 2024-01-01 PROCEDURE — 99232 SBSQ HOSP IP/OBS MODERATE 35: CPT | Performed by: STUDENT IN AN ORGANIZED HEALTH CARE EDUCATION/TRAINING PROGRAM

## 2024-01-01 PROCEDURE — 999N000141 HC STATISTIC PRE-PROCEDURE NURSING ASSESSMENT: Performed by: INTERNAL MEDICINE

## 2024-01-01 PROCEDURE — 96361 HYDRATE IV INFUSION ADD-ON: CPT

## 2024-01-01 PROCEDURE — 99223 1ST HOSP IP/OBS HIGH 75: CPT | Mod: AI | Performed by: HOSPITALIST

## 2024-01-01 PROCEDURE — 36415 COLL VENOUS BLD VENIPUNCTURE: CPT | Performed by: FAMILY MEDICINE

## 2024-01-01 PROCEDURE — 96361 HYDRATE IV INFUSION ADD-ON: CPT | Performed by: PHYSICIAN ASSISTANT

## 2024-01-01 PROCEDURE — 80202 ASSAY OF VANCOMYCIN: CPT | Performed by: HOSPITALIST

## 2024-01-01 PROCEDURE — 84460 ALANINE AMINO (ALT) (SGPT): CPT | Performed by: PHYSICIAN ASSISTANT

## 2024-01-01 PROCEDURE — 74178 CT ABD&PLV WO CNTR FLWD CNTR: CPT | Mod: MA

## 2024-01-01 PROCEDURE — 99284 EMERGENCY DEPT VISIT MOD MDM: CPT | Mod: 25 | Performed by: PHYSICIAN ASSISTANT

## 2024-01-01 PROCEDURE — 76705 ECHO EXAM OF ABDOMEN: CPT

## 2024-01-01 PROCEDURE — 96360 HYDRATION IV INFUSION INIT: CPT | Performed by: NURSE PRACTITIONER

## 2024-01-01 PROCEDURE — 85025 COMPLETE CBC W/AUTO DIFF WBC: CPT | Performed by: EMERGENCY MEDICINE

## 2024-01-01 PROCEDURE — 80048 BASIC METABOLIC PNL TOTAL CA: CPT | Performed by: STUDENT IN AN ORGANIZED HEALTH CARE EDUCATION/TRAINING PROGRAM

## 2024-01-01 PROCEDURE — 99284 EMERGENCY DEPT VISIT MOD MDM: CPT | Performed by: FAMILY MEDICINE

## 2024-01-01 PROCEDURE — 250N000009 HC RX 250: Performed by: INTERNAL MEDICINE

## 2024-01-01 PROCEDURE — 87040 BLOOD CULTURE FOR BACTERIA: CPT | Performed by: STUDENT IN AN ORGANIZED HEALTH CARE EDUCATION/TRAINING PROGRAM

## 2024-01-01 PROCEDURE — 74178 CT ABD&PLV WO CNTR FLWD CNTR: CPT | Mod: MG

## 2024-01-01 PROCEDURE — 36593 DECLOT VASCULAR DEVICE: CPT

## 2024-01-01 PROCEDURE — 999N000040 HC STATISTIC CONSULT NO CHARGE VASC ACCESS

## 2024-01-01 PROCEDURE — 82330 ASSAY OF CALCIUM: CPT | Performed by: PEDIATRICS

## 2024-01-01 PROCEDURE — 99283 EMERGENCY DEPT VISIT LOW MDM: CPT | Mod: 25 | Performed by: FAMILY MEDICINE

## 2024-01-01 PROCEDURE — 999N000127 HC STATISTIC PERIPHERAL IV START W US GUIDANCE

## 2024-01-01 PROCEDURE — 71045 X-RAY EXAM CHEST 1 VIEW: CPT

## 2024-01-01 PROCEDURE — 99233 SBSQ HOSP IP/OBS HIGH 50: CPT | Performed by: FAMILY MEDICINE

## 2024-01-01 PROCEDURE — 99207 EKG 12-LEAD COMPLETE W/READ - CLINICS: CPT | Performed by: STUDENT IN AN ORGANIZED HEALTH CARE EDUCATION/TRAINING PROGRAM

## 2024-01-01 PROCEDURE — 250N000011 HC RX IP 250 OP 636: Mod: JZ | Performed by: INTERNAL MEDICINE

## 2024-01-01 PROCEDURE — 85025 COMPLETE CBC W/AUTO DIFF WBC: CPT | Performed by: FAMILY MEDICINE

## 2024-01-01 PROCEDURE — 99214 OFFICE O/P EST MOD 30 MIN: CPT | Mod: 25 | Performed by: STUDENT IN AN ORGANIZED HEALTH CARE EDUCATION/TRAINING PROGRAM

## 2024-01-01 PROCEDURE — 86923 COMPATIBILITY TEST ELECTRIC: CPT | Performed by: PEDIATRICS

## 2024-01-01 PROCEDURE — 84155 ASSAY OF PROTEIN SERUM: CPT | Performed by: STUDENT IN AN ORGANIZED HEALTH CARE EDUCATION/TRAINING PROGRAM

## 2024-01-01 PROCEDURE — 82040 ASSAY OF SERUM ALBUMIN: CPT | Performed by: FAMILY MEDICINE

## 2024-01-01 PROCEDURE — 96374 THER/PROPH/DIAG INJ IV PUSH: CPT | Mod: 59 | Performed by: EMERGENCY MEDICINE

## 2024-01-01 PROCEDURE — 258N000003 HC RX IP 258 OP 636: Performed by: EMERGENCY MEDICINE

## 2024-01-01 PROCEDURE — 80053 COMPREHEN METABOLIC PANEL: CPT

## 2024-01-01 PROCEDURE — 82040 ASSAY OF SERUM ALBUMIN: CPT | Performed by: EMERGENCY MEDICINE

## 2024-01-01 PROCEDURE — 96360 HYDRATION IV INFUSION INIT: CPT | Performed by: FAMILY MEDICINE

## 2024-01-01 PROCEDURE — 83690 ASSAY OF LIPASE: CPT | Performed by: STUDENT IN AN ORGANIZED HEALTH CARE EDUCATION/TRAINING PROGRAM

## 2024-01-01 PROCEDURE — 250N000011 HC RX IP 250 OP 636: Performed by: ANESTHESIOLOGY

## 2024-01-01 PROCEDURE — 258N000003 HC RX IP 258 OP 636: Performed by: ANESTHESIOLOGY

## 2024-01-01 PROCEDURE — 82374 ASSAY BLOOD CARBON DIOXIDE: CPT | Performed by: FAMILY MEDICINE

## 2024-01-01 PROCEDURE — 51700 IRRIGATION OF BLADDER: CPT | Performed by: UROLOGY

## 2024-01-01 PROCEDURE — 99284 EMERGENCY DEPT VISIT MOD MDM: CPT | Performed by: PHYSICIAN ASSISTANT

## 2024-01-01 PROCEDURE — 99496 TRANSJ CARE MGMT HIGH F2F 7D: CPT | Performed by: STUDENT IN AN ORGANIZED HEALTH CARE EDUCATION/TRAINING PROGRAM

## 2024-01-01 PROCEDURE — 87637 SARSCOV2&INF A&B&RSV AMP PRB: CPT | Performed by: STUDENT IN AN ORGANIZED HEALTH CARE EDUCATION/TRAINING PROGRAM

## 2024-01-01 PROCEDURE — 99215 OFFICE O/P EST HI 40 MIN: CPT | Mod: 95 | Performed by: INTERNAL MEDICINE

## 2024-01-01 PROCEDURE — 999N000190 HC STATISTIC VAT ROUNDS

## 2024-01-01 PROCEDURE — 82565 ASSAY OF CREATININE: CPT | Performed by: INTERNAL MEDICINE

## 2024-01-01 PROCEDURE — 80202 ASSAY OF VANCOMYCIN: CPT | Performed by: PHYSICIAN ASSISTANT

## 2024-01-01 PROCEDURE — 96376 TX/PRO/DX INJ SAME DRUG ADON: CPT | Mod: 59 | Performed by: EMERGENCY MEDICINE

## 2024-01-01 PROCEDURE — 81001 URINALYSIS AUTO W/SCOPE: CPT | Performed by: HOSPITALIST

## 2024-01-01 PROCEDURE — 99283 EMERGENCY DEPT VISIT LOW MDM: CPT | Mod: 25 | Performed by: NURSE PRACTITIONER

## 2024-01-01 PROCEDURE — 81001 URINALYSIS AUTO W/SCOPE: CPT | Performed by: STUDENT IN AN ORGANIZED HEALTH CARE EDUCATION/TRAINING PROGRAM

## 2024-01-01 PROCEDURE — 360N000082 HC SURGERY LEVEL 2 W/ FLUORO, PER MIN: Performed by: INTERNAL MEDICINE

## 2024-01-01 PROCEDURE — 86900 BLOOD TYPING SEROLOGIC ABO: CPT | Performed by: FAMILY MEDICINE

## 2024-01-01 PROCEDURE — 250N000009 HC RX 250: Performed by: EMERGENCY MEDICINE

## 2024-01-01 PROCEDURE — 84295 ASSAY OF SERUM SODIUM: CPT | Performed by: INTERNAL MEDICINE

## 2024-01-01 PROCEDURE — 93970 EXTREMITY STUDY: CPT

## 2024-01-01 PROCEDURE — 99283 EMERGENCY DEPT VISIT LOW MDM: CPT | Performed by: FAMILY MEDICINE

## 2024-01-01 PROCEDURE — 258N000003 HC RX IP 258 OP 636

## 2024-01-01 PROCEDURE — 99417 PROLNG OP E/M EACH 15 MIN: CPT | Mod: 95 | Performed by: INTERNAL MEDICINE

## 2024-01-01 PROCEDURE — 250N000011 HC RX IP 250 OP 636

## 2024-01-01 PROCEDURE — P9016 RBC LEUKOCYTES REDUCED: HCPCS | Performed by: PEDIATRICS

## 2024-01-01 PROCEDURE — 258N000003 HC RX IP 258 OP 636: Performed by: INTERNAL MEDICINE

## 2024-01-01 PROCEDURE — 85652 RBC SED RATE AUTOMATED: CPT | Performed by: STUDENT IN AN ORGANIZED HEALTH CARE EDUCATION/TRAINING PROGRAM

## 2024-01-01 PROCEDURE — P9016 RBC LEUKOCYTES REDUCED: HCPCS | Performed by: FAMILY MEDICINE

## 2024-01-01 PROCEDURE — 99285 EMERGENCY DEPT VISIT HI MDM: CPT | Mod: 25 | Performed by: EMERGENCY MEDICINE

## 2024-01-01 PROCEDURE — 84132 ASSAY OF SERUM POTASSIUM: CPT | Performed by: PEDIATRICS

## 2024-01-01 PROCEDURE — 51703 INSERT BLADDER CATH COMPLEX: CPT | Performed by: UROLOGY

## 2024-01-01 PROCEDURE — 82374 ASSAY BLOOD CARBON DIOXIDE: CPT | Performed by: EMERGENCY MEDICINE

## 2024-01-01 PROCEDURE — 96361 HYDRATE IV INFUSION ADD-ON: CPT | Performed by: NURSE PRACTITIONER

## 2024-01-01 RX ORDER — TAMSULOSIN HYDROCHLORIDE 0.4 MG/1
0.4 CAPSULE ORAL DAILY
Status: DISCONTINUED | OUTPATIENT
Start: 2024-01-01 | End: 2024-01-01 | Stop reason: HOSPADM

## 2024-01-01 RX ORDER — NALOXONE HYDROCHLORIDE 0.4 MG/ML
0.4 INJECTION, SOLUTION INTRAMUSCULAR; INTRAVENOUS; SUBCUTANEOUS
Status: DISCONTINUED | OUTPATIENT
Start: 2024-01-01 | End: 2024-01-01

## 2024-01-01 RX ORDER — NALOXONE HYDROCHLORIDE 0.4 MG/ML
0.2 INJECTION, SOLUTION INTRAMUSCULAR; INTRAVENOUS; SUBCUTANEOUS
Status: DISCONTINUED | OUTPATIENT
Start: 2024-01-01 | End: 2024-01-01 | Stop reason: HOSPADM

## 2024-01-01 RX ORDER — ONDANSETRON 4 MG/1
4 TABLET, ORALLY DISINTEGRATING ORAL EVERY 30 MIN PRN
Status: DISCONTINUED | OUTPATIENT
Start: 2024-01-01 | End: 2024-01-01 | Stop reason: HOSPADM

## 2024-01-01 RX ORDER — HEPARIN SODIUM (PORCINE) LOCK FLUSH IV SOLN 100 UNIT/ML 100 UNIT/ML
5-10 SOLUTION INTRAVENOUS
Status: DISCONTINUED | OUTPATIENT
Start: 2024-01-01 | End: 2024-01-01 | Stop reason: HOSPADM

## 2024-01-01 RX ORDER — OXYCODONE HYDROCHLORIDE 5 MG/1
5-10 TABLET ORAL
Status: DISCONTINUED | OUTPATIENT
Start: 2024-01-01 | End: 2024-01-01

## 2024-01-01 RX ORDER — SODIUM CHLORIDE 9 MG/ML
INJECTION, SOLUTION INTRAVENOUS CONTINUOUS
Status: DISCONTINUED | OUTPATIENT
Start: 2024-01-01 | End: 2024-01-01

## 2024-01-01 RX ORDER — ONDANSETRON 4 MG/1
4 TABLET, ORALLY DISINTEGRATING ORAL EVERY 6 HOURS PRN
Status: DISCONTINUED | OUTPATIENT
Start: 2024-01-01 | End: 2024-01-01 | Stop reason: HOSPADM

## 2024-01-01 RX ORDER — FUROSEMIDE 20 MG
20 TABLET ORAL DAILY
Qty: 1 TABLET | Refills: 0 | Status: SHIPPED | OUTPATIENT
Start: 2024-01-01 | End: 2024-01-01

## 2024-01-01 RX ORDER — IOPAMIDOL 755 MG/ML
500 INJECTION, SOLUTION INTRAVASCULAR ONCE
Status: COMPLETED | OUTPATIENT
Start: 2024-01-01 | End: 2024-01-01

## 2024-01-01 RX ORDER — NALOXONE HYDROCHLORIDE 0.4 MG/ML
0.4 INJECTION, SOLUTION INTRAMUSCULAR; INTRAVENOUS; SUBCUTANEOUS
Status: DISCONTINUED | OUTPATIENT
Start: 2024-01-01 | End: 2024-01-01 | Stop reason: HOSPADM

## 2024-01-01 RX ORDER — ENOXAPARIN SODIUM 100 MG/ML
0.75 INJECTION SUBCUTANEOUS EVERY 12 HOURS
Qty: 11.2 ML | Refills: 0 | Status: ON HOLD | OUTPATIENT
Start: 2024-01-01 | End: 2024-01-01

## 2024-01-01 RX ORDER — INDOMETHACIN 50 MG/1
100 SUPPOSITORY RECTAL
Status: DISCONTINUED | OUTPATIENT
Start: 2024-01-01 | End: 2024-01-01 | Stop reason: HOSPADM

## 2024-01-01 RX ORDER — FLUMAZENIL 0.1 MG/ML
0.2 INJECTION, SOLUTION INTRAVENOUS
Status: DISCONTINUED | OUTPATIENT
Start: 2024-01-01 | End: 2024-01-01 | Stop reason: HOSPADM

## 2024-01-01 RX ORDER — HYDROXYZINE HYDROCHLORIDE 25 MG/1
50 TABLET, FILM COATED ORAL 3 TIMES DAILY PRN
Status: DISCONTINUED | OUTPATIENT
Start: 2024-01-01 | End: 2024-01-01 | Stop reason: HOSPADM

## 2024-01-01 RX ORDER — BUDESONIDE 3 MG/1
6 CAPSULE, COATED PELLETS ORAL DAILY
COMMUNITY

## 2024-01-01 RX ORDER — SODIUM CHLORIDE, SODIUM LACTATE, POTASSIUM CHLORIDE, CALCIUM CHLORIDE 600; 310; 30; 20 MG/100ML; MG/100ML; MG/100ML; MG/100ML
INJECTION, SOLUTION INTRAVENOUS CONTINUOUS
Status: DISCONTINUED | OUTPATIENT
Start: 2024-01-01 | End: 2024-01-01 | Stop reason: HOSPADM

## 2024-01-01 RX ORDER — AMOXICILLIN 250 MG
1 CAPSULE ORAL 2 TIMES DAILY PRN
Status: DISCONTINUED | OUTPATIENT
Start: 2024-01-01 | End: 2024-01-01 | Stop reason: HOSPADM

## 2024-01-01 RX ORDER — ONDANSETRON 4 MG/1
4 TABLET, ORALLY DISINTEGRATING ORAL EVERY 8 HOURS PRN
Qty: 10 TABLET | Refills: 0 | Status: SHIPPED | OUTPATIENT
Start: 2024-01-01 | End: 2024-01-01

## 2024-01-01 RX ORDER — NYSTATIN 100000/ML
500000 SUSPENSION, ORAL (FINAL DOSE FORM) ORAL 4 TIMES DAILY
Qty: 280 ML | Refills: 0 | Status: SHIPPED | OUTPATIENT
Start: 2024-01-01 | End: 2024-01-01

## 2024-01-01 RX ORDER — HEPARIN SODIUM,PORCINE 10 UNIT/ML
5-20 VIAL (ML) INTRAVENOUS DAILY PRN
Status: DISCONTINUED | OUTPATIENT
Start: 2024-01-01 | End: 2024-01-01 | Stop reason: HOSPADM

## 2024-01-01 RX ORDER — FENTANYL CITRATE 50 UG/ML
25 INJECTION, SOLUTION INTRAMUSCULAR; INTRAVENOUS EVERY 5 MIN PRN
Status: DISCONTINUED | OUTPATIENT
Start: 2024-01-01 | End: 2024-01-01 | Stop reason: HOSPADM

## 2024-01-01 RX ORDER — LORAZEPAM 2 MG/ML
1 INJECTION INTRAMUSCULAR ONCE
Status: COMPLETED | OUTPATIENT
Start: 2024-01-01 | End: 2024-01-01

## 2024-01-01 RX ORDER — ACETAMINOPHEN 325 MG/1
650 TABLET ORAL EVERY 4 HOURS PRN
Status: DISCONTINUED | OUTPATIENT
Start: 2024-01-01 | End: 2024-01-01

## 2024-01-01 RX ORDER — TAMSULOSIN HYDROCHLORIDE 0.4 MG/1
0.4 CAPSULE ORAL DAILY
Qty: 30 CAPSULE | Refills: 0 | Status: SHIPPED | OUTPATIENT
Start: 2024-01-01 | End: 2024-01-01

## 2024-01-01 RX ORDER — ENOXAPARIN SODIUM 100 MG/ML
1 INJECTION SUBCUTANEOUS EVERY 12 HOURS
Status: DISCONTINUED | OUTPATIENT
Start: 2024-01-01 | End: 2024-01-01 | Stop reason: HOSPADM

## 2024-01-01 RX ORDER — ZOLPIDEM TARTRATE 10 MG/1
10 TABLET ORAL
Qty: 30 TABLET | Refills: 0 | Status: SHIPPED | OUTPATIENT
Start: 2024-01-01

## 2024-01-01 RX ORDER — LORAZEPAM 0.5 MG/1
0.5 TABLET ORAL EVERY 6 HOURS PRN
Status: DISCONTINUED | OUTPATIENT
Start: 2024-01-01 | End: 2024-01-01 | Stop reason: HOSPADM

## 2024-01-01 RX ORDER — HEPARIN SODIUM 10000 [USP'U]/100ML
0-5000 INJECTION, SOLUTION INTRAVENOUS CONTINUOUS
Status: DISCONTINUED | OUTPATIENT
Start: 2024-01-01 | End: 2024-01-01

## 2024-01-01 RX ORDER — OXYCODONE HYDROCHLORIDE 5 MG/1
5-10 TABLET ORAL EVERY 6 HOURS PRN
Qty: 140 TABLET | Refills: 0 | Status: SHIPPED | OUTPATIENT
Start: 2024-01-01 | End: 2024-01-01

## 2024-01-01 RX ORDER — HYDROMORPHONE HYDROCHLORIDE 1 MG/ML
0.5 INJECTION, SOLUTION INTRAMUSCULAR; INTRAVENOUS; SUBCUTANEOUS EVERY 30 MIN PRN
Status: COMPLETED | OUTPATIENT
Start: 2024-01-01 | End: 2024-01-01

## 2024-01-01 RX ORDER — OXYCODONE HYDROCHLORIDE 5 MG/1
5-10 TABLET ORAL EVERY 6 HOURS PRN
Qty: 140 TABLET | Refills: 0 | OUTPATIENT
Start: 2024-01-01 | End: 2024-01-01

## 2024-01-01 RX ORDER — HYDROMORPHONE HYDROCHLORIDE 1 MG/ML
0.5 INJECTION, SOLUTION INTRAMUSCULAR; INTRAVENOUS; SUBCUTANEOUS
Status: DISCONTINUED | OUTPATIENT
Start: 2024-01-01 | End: 2024-01-01

## 2024-01-01 RX ORDER — OXYCODONE HYDROCHLORIDE 5 MG/1
5-10 TABLET ORAL EVERY 6 HOURS PRN
Status: DISCONTINUED | OUTPATIENT
Start: 2024-01-01 | End: 2024-01-01

## 2024-01-01 RX ORDER — OXYCODONE HYDROCHLORIDE 5 MG/1
TABLET ORAL
Qty: 120 TABLET | Refills: 0 | Status: ON HOLD | OUTPATIENT
Start: 2024-01-01 | End: 2024-01-01

## 2024-01-01 RX ORDER — FLUCONAZOLE 200 MG/1
400 TABLET ORAL DAILY
Qty: 15 TABLET | Refills: 0 | Status: SHIPPED | OUTPATIENT
Start: 2024-01-01 | End: 2024-01-01

## 2024-01-01 RX ORDER — PREGABALIN 150 MG/1
150 CAPSULE ORAL 2 TIMES DAILY
Status: DISCONTINUED | OUTPATIENT
Start: 2024-01-01 | End: 2024-01-01 | Stop reason: HOSPADM

## 2024-01-01 RX ORDER — ACETAMINOPHEN 325 MG/1
650 TABLET ORAL EVERY 4 HOURS PRN
Status: DISCONTINUED | OUTPATIENT
Start: 2024-01-01 | End: 2024-01-01 | Stop reason: HOSPADM

## 2024-01-01 RX ORDER — PREGABALIN 150 MG/1
150 CAPSULE ORAL 2 TIMES DAILY
Qty: 60 CAPSULE | Refills: 0 | Status: SHIPPED | OUTPATIENT
Start: 2024-01-01 | End: 2024-01-01

## 2024-01-01 RX ORDER — DEXAMETHASONE SODIUM PHOSPHATE 4 MG/ML
INJECTION, SOLUTION INTRA-ARTICULAR; INTRALESIONAL; INTRAMUSCULAR; INTRAVENOUS; SOFT TISSUE PRN
Status: DISCONTINUED | OUTPATIENT
Start: 2024-01-01 | End: 2024-01-01

## 2024-01-01 RX ORDER — DIPHENHYDRAMINE HCL 25 MG
25 CAPSULE ORAL EVERY 6 HOURS PRN
Status: DISCONTINUED | OUTPATIENT
Start: 2024-01-01 | End: 2024-01-01 | Stop reason: HOSPADM

## 2024-01-01 RX ORDER — FENTANYL CITRATE 50 UG/ML
INJECTION, SOLUTION INTRAMUSCULAR; INTRAVENOUS PRN
Status: DISCONTINUED | OUTPATIENT
Start: 2024-01-01 | End: 2024-01-01

## 2024-01-01 RX ORDER — HEPARIN SODIUM,PORCINE 10 UNIT/ML
5-10 VIAL (ML) INTRAVENOUS
Status: DISCONTINUED | OUTPATIENT
Start: 2024-01-01 | End: 2024-01-01 | Stop reason: HOSPADM

## 2024-01-01 RX ORDER — LEVOTHYROXINE SODIUM 88 UG/1
88 TABLET ORAL DAILY
Qty: 90 TABLET | Refills: 1 | Status: SHIPPED | OUTPATIENT
Start: 2024-01-01 | End: 2024-01-01

## 2024-01-01 RX ORDER — HYDROXYZINE HYDROCHLORIDE 25 MG/1
25 TABLET, FILM COATED ORAL ONCE
Status: COMPLETED | OUTPATIENT
Start: 2024-01-01 | End: 2024-01-01

## 2024-01-01 RX ORDER — SODIUM CHLORIDE, SODIUM LACTATE, POTASSIUM CHLORIDE, CALCIUM CHLORIDE 600; 310; 30; 20 MG/100ML; MG/100ML; MG/100ML; MG/100ML
INJECTION, SOLUTION INTRAVENOUS CONTINUOUS
Status: DISCONTINUED | OUTPATIENT
Start: 2024-01-01 | End: 2024-01-01

## 2024-01-01 RX ORDER — PRAMOXINE HYDROCHLORIDE 10 MG/ML
LOTION TOPICAL 3 TIMES DAILY
Status: DISCONTINUED | OUTPATIENT
Start: 2024-01-01 | End: 2024-01-01 | Stop reason: HOSPADM

## 2024-01-01 RX ORDER — PREGABALIN 150 MG/1
150 CAPSULE ORAL 2 TIMES DAILY
Qty: 60 CAPSULE | Refills: 0 | Status: SHIPPED | OUTPATIENT
Start: 2024-01-01

## 2024-01-01 RX ORDER — HYDROXYZINE HYDROCHLORIDE 25 MG/1
25-50 TABLET, FILM COATED ORAL 3 TIMES DAILY PRN
Qty: 30 TABLET | Refills: 0 | Status: SHIPPED | OUTPATIENT
Start: 2024-01-01 | End: 2024-01-01

## 2024-01-01 RX ORDER — DULOXETIN HYDROCHLORIDE 60 MG/1
60 CAPSULE, DELAYED RELEASE ORAL DAILY
Qty: 90 CAPSULE | Refills: 1 | Status: SHIPPED | OUTPATIENT
Start: 2024-01-01

## 2024-01-01 RX ORDER — ONDANSETRON 2 MG/ML
4 INJECTION INTRAMUSCULAR; INTRAVENOUS EVERY 30 MIN PRN
Status: DISCONTINUED | OUTPATIENT
Start: 2024-01-01 | End: 2024-01-01 | Stop reason: HOSPADM

## 2024-01-01 RX ORDER — HEPARIN SODIUM,PORCINE 10 UNIT/ML
5-20 VIAL (ML) INTRAVENOUS DAILY PRN
Status: CANCELLED | OUTPATIENT
Start: 2024-01-01

## 2024-01-01 RX ORDER — METOPROLOL SUCCINATE 25 MG/1
25 TABLET, EXTENDED RELEASE ORAL DAILY
Status: DISCONTINUED | OUTPATIENT
Start: 2024-01-01 | End: 2024-01-01

## 2024-01-01 RX ORDER — CEFAZOLIN SODIUM 1 G/50ML
1250 SOLUTION INTRAVENOUS EVERY 24 HOURS
Status: DISCONTINUED | OUTPATIENT
Start: 2024-01-01 | End: 2024-01-01 | Stop reason: HOSPADM

## 2024-01-01 RX ORDER — LIDOCAINE 40 MG/G
CREAM TOPICAL
Status: DISCONTINUED | OUTPATIENT
Start: 2024-01-01 | End: 2024-01-01 | Stop reason: HOSPADM

## 2024-01-01 RX ORDER — FAMOTIDINE 20 MG/1
20 TABLET, FILM COATED ORAL 2 TIMES DAILY
Status: DISCONTINUED | OUTPATIENT
Start: 2024-01-01 | End: 2024-01-01 | Stop reason: HOSPADM

## 2024-01-01 RX ORDER — ONDANSETRON 2 MG/ML
4 INJECTION INTRAMUSCULAR; INTRAVENOUS EVERY 6 HOURS PRN
Status: DISCONTINUED | OUTPATIENT
Start: 2024-01-01 | End: 2024-01-01 | Stop reason: HOSPADM

## 2024-01-01 RX ORDER — CALCIUM CARBONATE 500 MG/1
1000 TABLET, CHEWABLE ORAL 4 TIMES DAILY PRN
Status: DISCONTINUED | OUTPATIENT
Start: 2024-01-01 | End: 2024-01-01 | Stop reason: HOSPADM

## 2024-01-01 RX ORDER — AMOXICILLIN 250 MG
2 CAPSULE ORAL 2 TIMES DAILY PRN
Status: DISCONTINUED | OUTPATIENT
Start: 2024-01-01 | End: 2024-01-01 | Stop reason: HOSPADM

## 2024-01-01 RX ORDER — OXYCODONE HYDROCHLORIDE 5 MG/1
10 TABLET ORAL EVERY 4 HOURS PRN
Status: DISCONTINUED | OUTPATIENT
Start: 2024-01-01 | End: 2024-01-01

## 2024-01-01 RX ORDER — MORPHINE SULFATE 4 MG/ML
4 INJECTION, SOLUTION INTRAMUSCULAR; INTRAVENOUS
Status: DISCONTINUED | OUTPATIENT
Start: 2024-01-01 | End: 2024-01-01 | Stop reason: HOSPADM

## 2024-01-01 RX ORDER — CIPROFLOXACIN 500 MG/1
500 TABLET, FILM COATED ORAL DAILY
COMMUNITY
End: 2024-01-01

## 2024-01-01 RX ORDER — PROPOFOL 10 MG/ML
INJECTION, EMULSION INTRAVENOUS PRN
Status: DISCONTINUED | OUTPATIENT
Start: 2024-01-01 | End: 2024-01-01

## 2024-01-01 RX ORDER — PREDNISONE 20 MG/1
20 TABLET ORAL DAILY
Status: DISCONTINUED | OUTPATIENT
Start: 2024-01-01 | End: 2024-01-01 | Stop reason: HOSPADM

## 2024-01-01 RX ORDER — NITROGLYCERIN 0.4 MG/1
0.4 TABLET SUBLINGUAL EVERY 5 MIN PRN
Status: DISCONTINUED | OUTPATIENT
Start: 2024-01-01 | End: 2024-01-01 | Stop reason: HOSPADM

## 2024-01-01 RX ORDER — OXYCODONE HYDROCHLORIDE 5 MG/1
10 TABLET ORAL EVERY 6 HOURS PRN
Qty: 24 TABLET | Refills: 0 | Status: SHIPPED | OUTPATIENT
Start: 2024-01-01 | End: 2024-01-01

## 2024-01-01 RX ORDER — ACETAMINOPHEN 650 MG/1
650 SUPPOSITORY RECTAL EVERY 4 HOURS PRN
Status: DISCONTINUED | OUTPATIENT
Start: 2024-01-01 | End: 2024-01-01 | Stop reason: HOSPADM

## 2024-01-01 RX ORDER — LIDOCAINE HYDROCHLORIDE 20 MG/ML
INJECTION, SOLUTION INFILTRATION; PERINEURAL PRN
Status: DISCONTINUED | OUTPATIENT
Start: 2024-01-01 | End: 2024-01-01

## 2024-01-01 RX ORDER — OXYCODONE HYDROCHLORIDE 5 MG/1
5-10 TABLET ORAL EVERY 6 HOURS PRN
Qty: 240 TABLET | Refills: 0 | Status: SHIPPED | OUTPATIENT
Start: 2024-01-01 | End: 2024-05-01

## 2024-01-01 RX ORDER — OXYCODONE HYDROCHLORIDE 5 MG/1
5 TABLET ORAL ONCE
Status: COMPLETED | OUTPATIENT
Start: 2024-01-01 | End: 2024-01-01

## 2024-01-01 RX ORDER — ZOLPIDEM TARTRATE 5 MG/1
10 TABLET ORAL
Status: DISCONTINUED | OUTPATIENT
Start: 2024-01-01 | End: 2024-01-01 | Stop reason: HOSPADM

## 2024-01-01 RX ORDER — ONDANSETRON 2 MG/ML
4 INJECTION INTRAMUSCULAR; INTRAVENOUS EVERY 6 HOURS PRN
Status: DISCONTINUED | OUTPATIENT
Start: 2024-01-01 | End: 2024-01-01

## 2024-01-01 RX ORDER — HYDROMORPHONE HYDROCHLORIDE 1 MG/ML
0.5 INJECTION, SOLUTION INTRAMUSCULAR; INTRAVENOUS; SUBCUTANEOUS EVERY 4 HOURS PRN
Status: DISCONTINUED | OUTPATIENT
Start: 2024-01-01 | End: 2024-01-01 | Stop reason: HOSPADM

## 2024-01-01 RX ORDER — PREDNISONE 10 MG/1
TABLET ORAL
Qty: 11 TABLET | Refills: 0 | Status: ON HOLD | OUTPATIENT
Start: 2024-01-01 | End: 2024-01-01

## 2024-01-01 RX ORDER — LEVOTHYROXINE SODIUM 88 UG/1
88 TABLET ORAL
Status: DISCONTINUED | OUTPATIENT
Start: 2024-01-01 | End: 2024-01-01 | Stop reason: HOSPADM

## 2024-01-01 RX ORDER — PREDNISONE 5 MG/1
5 TABLET ORAL DAILY
Qty: 3 TABLET | Refills: 0 | Status: COMPLETED | OUTPATIENT
Start: 2024-01-01 | End: 2024-01-01

## 2024-01-01 RX ORDER — HEPARIN SODIUM,PORCINE 10 UNIT/ML
5-10 VIAL (ML) INTRAVENOUS EVERY 24 HOURS
Status: DISCONTINUED | OUTPATIENT
Start: 2024-01-01 | End: 2024-01-01 | Stop reason: HOSPADM

## 2024-01-01 RX ORDER — CEFTRIAXONE 1 G/1
1 INJECTION, POWDER, FOR SOLUTION INTRAMUSCULAR; INTRAVENOUS ONCE
Status: COMPLETED | OUTPATIENT
Start: 2024-01-01 | End: 2024-01-01

## 2024-01-01 RX ORDER — PREDNISONE 20 MG/1
40 TABLET ORAL DAILY
Qty: 6 TABLET | Refills: 0 | Status: SHIPPED | OUTPATIENT
Start: 2024-01-01

## 2024-01-01 RX ORDER — OXYCODONE HYDROCHLORIDE 5 MG/1
10 TABLET ORAL EVERY 4 HOURS PRN
Status: DISCONTINUED | OUTPATIENT
Start: 2024-01-01 | End: 2024-01-01 | Stop reason: HOSPADM

## 2024-01-01 RX ORDER — FAMOTIDINE 20 MG/1
20 TABLET, FILM COATED ORAL 2 TIMES DAILY PRN
Status: DISCONTINUED | OUTPATIENT
Start: 2024-01-01 | End: 2024-01-01 | Stop reason: HOSPADM

## 2024-01-01 RX ORDER — FLUCONAZOLE 200 MG/1
400 TABLET ORAL DAILY
Qty: 15 TABLET | Refills: 0 | Status: SHIPPED | OUTPATIENT
Start: 2024-01-01

## 2024-01-01 RX ORDER — ONDANSETRON 4 MG/1
4 TABLET, ORALLY DISINTEGRATING ORAL EVERY 8 HOURS PRN
Status: DISCONTINUED | OUTPATIENT
Start: 2024-01-01 | End: 2024-01-01

## 2024-01-01 RX ORDER — LEVOTHYROXINE SODIUM 100 UG/1
100 TABLET ORAL DAILY
Qty: 90 TABLET | Refills: 1 | Status: SHIPPED | OUTPATIENT
Start: 2024-01-01

## 2024-01-01 RX ORDER — ROPIVACAINE IN 0.9% SOD CHL/PF 0.1 %
.03-.125 PLASTIC BAG, INJECTION (ML) EPIDURAL CONTINUOUS
Status: DISCONTINUED | OUTPATIENT
Start: 2024-01-01 | End: 2024-01-01 | Stop reason: HOSPADM

## 2024-01-01 RX ORDER — PREDNISONE 5 MG/1
10 TABLET ORAL DAILY
Qty: 2 TABLET | Refills: 0 | Status: COMPLETED | OUTPATIENT
Start: 2024-01-01 | End: 2024-01-01

## 2024-01-01 RX ORDER — LORAZEPAM 0.5 MG/1
0.5 TABLET ORAL EVERY 6 HOURS
Qty: 5 TABLET | Refills: 0 | Status: SHIPPED | OUTPATIENT
Start: 2024-01-01

## 2024-01-01 RX ORDER — CEFTRIAXONE 1 G/1
1 INJECTION, POWDER, FOR SOLUTION INTRAMUSCULAR; INTRAVENOUS EVERY 24 HOURS
Status: DISCONTINUED | OUTPATIENT
Start: 2024-01-01 | End: 2024-01-01 | Stop reason: HOSPADM

## 2024-01-01 RX ORDER — ACETAMINOPHEN 325 MG/1
975 TABLET ORAL ONCE
Status: COMPLETED | OUTPATIENT
Start: 2024-01-01 | End: 2024-01-01

## 2024-01-01 RX ORDER — ASPIRIN 81 MG/1
81 TABLET ORAL DAILY
Status: DISCONTINUED | OUTPATIENT
Start: 2024-01-01 | End: 2024-01-01 | Stop reason: HOSPADM

## 2024-01-01 RX ORDER — HYDROMORPHONE HYDROCHLORIDE 1 MG/ML
0.3 INJECTION, SOLUTION INTRAMUSCULAR; INTRAVENOUS; SUBCUTANEOUS EVERY 6 HOURS PRN
Status: DISCONTINUED | OUTPATIENT
Start: 2024-01-01 | End: 2024-01-01

## 2024-01-01 RX ORDER — LORAZEPAM 2 MG/ML
0.5 INJECTION INTRAMUSCULAR EVERY 6 HOURS PRN
Status: DISCONTINUED | OUTPATIENT
Start: 2024-01-01 | End: 2024-01-01

## 2024-01-01 RX ORDER — ZOLPIDEM TARTRATE 10 MG/1
10 TABLET ORAL
Qty: 30 TABLET | Refills: 0 | Status: SHIPPED | OUTPATIENT
Start: 2024-01-01 | End: 2024-01-01

## 2024-01-01 RX ORDER — ENOXAPARIN SODIUM 100 MG/ML
0.75 INJECTION SUBCUTANEOUS EVERY 12 HOURS
Status: DISCONTINUED | OUTPATIENT
Start: 2024-01-01 | End: 2024-01-01 | Stop reason: HOSPADM

## 2024-01-01 RX ORDER — ZOLPIDEM TARTRATE 5 MG/1
5 TABLET ORAL
Status: DISCONTINUED | OUTPATIENT
Start: 2024-01-01 | End: 2024-01-01 | Stop reason: HOSPADM

## 2024-01-01 RX ORDER — CEFAZOLIN SODIUM 1 G/50ML
1250 SOLUTION INTRAVENOUS EVERY 24 HOURS
Status: DISCONTINUED | OUTPATIENT
Start: 2024-01-01 | End: 2024-01-01

## 2024-01-01 RX ORDER — HYDRALAZINE HYDROCHLORIDE 20 MG/ML
10 INJECTION INTRAMUSCULAR; INTRAVENOUS EVERY 6 HOURS PRN
Status: DISCONTINUED | OUTPATIENT
Start: 2024-01-01 | End: 2024-01-01 | Stop reason: HOSPADM

## 2024-01-01 RX ORDER — HYDROXYZINE HYDROCHLORIDE 25 MG/1
TABLET, FILM COATED ORAL
Qty: 30 TABLET | Refills: 3 | Status: SHIPPED | OUTPATIENT
Start: 2024-01-01

## 2024-01-01 RX ORDER — SODIUM CHLORIDE 9 MG/ML
INJECTION, SOLUTION INTRAVENOUS CONTINUOUS
Status: DISCONTINUED | OUTPATIENT
Start: 2024-01-01 | End: 2024-01-01 | Stop reason: HOSPADM

## 2024-01-01 RX ORDER — CEFTRIAXONE 2 G/1
2 INJECTION, POWDER, FOR SOLUTION INTRAMUSCULAR; INTRAVENOUS EVERY 24 HOURS
Status: DISCONTINUED | OUTPATIENT
Start: 2024-01-01 | End: 2024-01-01

## 2024-01-01 RX ORDER — ONDANSETRON 4 MG/1
4 TABLET, ORALLY DISINTEGRATING ORAL EVERY 6 HOURS PRN
Status: DISCONTINUED | OUTPATIENT
Start: 2024-01-01 | End: 2024-01-01

## 2024-01-01 RX ORDER — POTASSIUM CHLORIDE 1500 MG/1
40 TABLET, EXTENDED RELEASE ORAL ONCE
Status: COMPLETED | OUTPATIENT
Start: 2024-01-01 | End: 2024-01-01

## 2024-01-01 RX ORDER — HYDROMORPHONE HCL IN WATER/PF 6 MG/30 ML
0.4 PATIENT CONTROLLED ANALGESIA SYRINGE INTRAVENOUS EVERY 5 MIN PRN
Status: DISCONTINUED | OUTPATIENT
Start: 2024-01-01 | End: 2024-01-01 | Stop reason: HOSPADM

## 2024-01-01 RX ORDER — POLYETHYLENE GLYCOL 3350 17 G/17G
17 POWDER, FOR SOLUTION ORAL 2 TIMES DAILY PRN
Status: DISCONTINUED | OUTPATIENT
Start: 2024-01-01 | End: 2024-01-01 | Stop reason: HOSPADM

## 2024-01-01 RX ORDER — HEPARIN SODIUM 5000 [USP'U]/.5ML
5000 INJECTION, SOLUTION INTRAVENOUS; SUBCUTANEOUS EVERY 8 HOURS
Status: DISCONTINUED | OUTPATIENT
Start: 2024-01-01 | End: 2024-01-01 | Stop reason: ALTCHOICE

## 2024-01-01 RX ORDER — ACETAMINOPHEN 325 MG/1
325-650 TABLET ORAL EVERY 6 HOURS PRN
Status: DISCONTINUED | OUTPATIENT
Start: 2024-01-01 | End: 2024-01-01

## 2024-01-01 RX ORDER — VANCOMYCIN HYDROCHLORIDE 1 G/200ML
1000 INJECTION, SOLUTION INTRAVENOUS ONCE
Status: COMPLETED | OUTPATIENT
Start: 2024-01-01 | End: 2024-01-01

## 2024-01-01 RX ORDER — RESPIRATORY SYNCYTIAL VIRUS VACCINE 120MCG/0.5
0.5 KIT INTRAMUSCULAR ONCE
Qty: 1 EACH | Refills: 0 | Status: CANCELLED | OUTPATIENT
Start: 2024-01-01 | End: 2024-01-01

## 2024-01-01 RX ORDER — MORPHINE SULFATE 4 MG/ML
4 INJECTION, SOLUTION INTRAMUSCULAR; INTRAVENOUS
Status: COMPLETED | OUTPATIENT
Start: 2024-01-01 | End: 2024-01-01

## 2024-01-01 RX ORDER — HYDROXYZINE HYDROCHLORIDE 25 MG/1
25-50 TABLET, FILM COATED ORAL 3 TIMES DAILY PRN
Status: DISCONTINUED | OUTPATIENT
Start: 2024-01-01 | End: 2024-01-01 | Stop reason: HOSPADM

## 2024-01-01 RX ORDER — LINEZOLID 600 MG/1
600 TABLET, FILM COATED ORAL
COMMUNITY
Start: 2024-01-01 | End: 2024-05-29

## 2024-01-01 RX ORDER — FENTANYL CITRATE 50 UG/ML
50 INJECTION, SOLUTION INTRAMUSCULAR; INTRAVENOUS EVERY 5 MIN PRN
Status: DISCONTINUED | OUTPATIENT
Start: 2024-01-01 | End: 2024-01-01 | Stop reason: HOSPADM

## 2024-01-01 RX ORDER — HYDROXYZINE HYDROCHLORIDE 25 MG/1
TABLET, FILM COATED ORAL
Qty: 30 TABLET | Refills: 3 | Status: SHIPPED | OUTPATIENT
Start: 2024-01-01 | End: 2024-01-01

## 2024-01-01 RX ORDER — ROSUVASTATIN CALCIUM 20 MG/1
40 TABLET, COATED ORAL DAILY
Status: DISCONTINUED | OUTPATIENT
Start: 2024-01-01 | End: 2024-01-01 | Stop reason: HOSPADM

## 2024-01-01 RX ORDER — OXYCODONE HYDROCHLORIDE 5 MG/1
5 TABLET ORAL EVERY 4 HOURS PRN
Status: DISCONTINUED | OUTPATIENT
Start: 2024-01-01 | End: 2024-01-01 | Stop reason: HOSPADM

## 2024-01-01 RX ORDER — OXYCODONE HYDROCHLORIDE 5 MG/1
10 TABLET ORAL EVERY 6 HOURS PRN
Qty: 24 TABLET | Refills: 0 | OUTPATIENT
Start: 2024-01-01

## 2024-01-01 RX ORDER — MORPHINE SULFATE 15 MG/1
15 TABLET, FILM COATED, EXTENDED RELEASE ORAL EVERY 12 HOURS
Qty: 60 TABLET | Refills: 0 | Status: CANCELLED | OUTPATIENT
Start: 2024-01-01 | End: 2024-05-12

## 2024-01-01 RX ORDER — ONDANSETRON 2 MG/ML
INJECTION INTRAMUSCULAR; INTRAVENOUS PRN
Status: DISCONTINUED | OUTPATIENT
Start: 2024-01-01 | End: 2024-01-01

## 2024-01-01 RX ORDER — HYDROMORPHONE HCL IN WATER/PF 6 MG/30 ML
0.2 PATIENT CONTROLLED ANALGESIA SYRINGE INTRAVENOUS EVERY 5 MIN PRN
Status: DISCONTINUED | OUTPATIENT
Start: 2024-01-01 | End: 2024-01-01 | Stop reason: HOSPADM

## 2024-01-01 RX ORDER — ENOXAPARIN SODIUM 100 MG/ML
1 INJECTION SUBCUTANEOUS EVERY 12 HOURS
Qty: 100 ML | Refills: 0 | Status: SHIPPED | OUTPATIENT
Start: 2024-01-01

## 2024-01-01 RX ORDER — NALOXONE HYDROCHLORIDE 0.4 MG/ML
0.2 INJECTION, SOLUTION INTRAMUSCULAR; INTRAVENOUS; SUBCUTANEOUS
Status: DISCONTINUED | OUTPATIENT
Start: 2024-01-01 | End: 2024-01-01

## 2024-01-01 RX ORDER — BISACODYL 10 MG
10 SUPPOSITORY, RECTAL RECTAL DAILY PRN
Status: DISCONTINUED | OUTPATIENT
Start: 2024-01-01 | End: 2024-01-01 | Stop reason: HOSPADM

## 2024-01-01 RX ORDER — LORAZEPAM 0.5 MG/1
0.5 TABLET ORAL EVERY 6 HOURS PRN
Qty: 10 TABLET | Refills: 0 | Status: SHIPPED | OUTPATIENT
Start: 2024-01-01 | End: 2024-01-01

## 2024-01-01 RX ORDER — LORAZEPAM 0.5 MG/1
0.5 TABLET ORAL EVERY 6 HOURS
Qty: 10 TABLET | Refills: 0 | Status: SHIPPED | OUTPATIENT
Start: 2024-01-01 | End: 2024-01-01

## 2024-01-01 RX ORDER — METOPROLOL SUCCINATE 25 MG/1
25 TABLET, EXTENDED RELEASE ORAL DAILY
Status: DISCONTINUED | OUTPATIENT
Start: 2024-01-01 | End: 2024-01-01 | Stop reason: HOSPADM

## 2024-01-01 RX ORDER — OXYCODONE HYDROCHLORIDE 5 MG/1
10 TABLET ORAL
Status: DISCONTINUED | OUTPATIENT
Start: 2024-01-01 | End: 2024-01-01 | Stop reason: HOSPADM

## 2024-01-01 RX ORDER — CETIRIZINE HYDROCHLORIDE 10 MG/1
10 TABLET ORAL DAILY PRN
Status: DISCONTINUED | OUTPATIENT
Start: 2024-01-01 | End: 2024-01-01 | Stop reason: HOSPADM

## 2024-01-01 RX ORDER — ONDANSETRON 4 MG/1
4 TABLET, ORALLY DISINTEGRATING ORAL EVERY 8 HOURS PRN
Qty: 10 TABLET | Refills: 0 | Status: SHIPPED | OUTPATIENT
Start: 2024-01-01

## 2024-01-01 RX ADMIN — VANCOMYCIN HYDROCHLORIDE 1500 MG: 1 INJECTION, POWDER, LYOPHILIZED, FOR SOLUTION INTRAVENOUS at 16:34

## 2024-01-01 RX ADMIN — ACETAMINOPHEN 650 MG: 325 TABLET, FILM COATED ORAL at 14:49

## 2024-01-01 RX ADMIN — OXYCODONE HYDROCHLORIDE 10 MG: 5 TABLET ORAL at 00:30

## 2024-01-01 RX ADMIN — TAMSULOSIN HYDROCHLORIDE 0.4 MG: 0.4 CAPSULE ORAL at 08:25

## 2024-01-01 RX ADMIN — ACETAMINOPHEN 650 MG: 325 TABLET, FILM COATED ORAL at 06:12

## 2024-01-01 RX ADMIN — OXYCODONE HYDROCHLORIDE 10 MG: 5 TABLET ORAL at 16:57

## 2024-01-01 RX ADMIN — SODIUM CHLORIDE 1000 ML: 9 INJECTION, SOLUTION INTRAVENOUS at 06:28

## 2024-01-01 RX ADMIN — ASPIRIN 81 MG: 81 TABLET, COATED ORAL at 08:57

## 2024-01-01 RX ADMIN — HYDROXYZINE HYDROCHLORIDE 50 MG: 25 TABLET ORAL at 12:36

## 2024-01-01 RX ADMIN — ACETAMINOPHEN 650 MG: 325 TABLET, FILM COATED ORAL at 03:06

## 2024-01-01 RX ADMIN — OXYCODONE HYDROCHLORIDE 10 MG: 5 TABLET ORAL at 07:38

## 2024-01-01 RX ADMIN — OXYCODONE HYDROCHLORIDE 10 MG: 5 TABLET ORAL at 21:00

## 2024-01-01 RX ADMIN — HYDROXYZINE HYDROCHLORIDE 50 MG: 25 TABLET, FILM COATED ORAL at 11:51

## 2024-01-01 RX ADMIN — PRAMOXINE HYDROCHLORIDE: 10 LOTION TOPICAL at 21:59

## 2024-01-01 RX ADMIN — VANCOMYCIN HYDROCHLORIDE 1500 MG: 1 INJECTION, POWDER, LYOPHILIZED, FOR SOLUTION INTRAVENOUS at 09:53

## 2024-01-01 RX ADMIN — ACETAMINOPHEN 650 MG: 325 TABLET, FILM COATED ORAL at 08:19

## 2024-01-01 RX ADMIN — PREGABALIN 150 MG: 25 CAPSULE ORAL at 20:22

## 2024-01-01 RX ADMIN — OXYCODONE HYDROCHLORIDE 10 MG: 5 TABLET ORAL at 05:36

## 2024-01-01 RX ADMIN — PREDNISONE 5 MG: 5 TABLET ORAL at 09:28

## 2024-01-01 RX ADMIN — ACETAMINOPHEN 975 MG: 325 TABLET, FILM COATED ORAL at 02:45

## 2024-01-01 RX ADMIN — HEPARIN, PORCINE (PF) 10 UNIT/ML INTRAVENOUS SYRINGE 5 ML: at 15:43

## 2024-01-01 RX ADMIN — HYDROXYZINE HYDROCHLORIDE 50 MG: 25 TABLET ORAL at 16:41

## 2024-01-01 RX ADMIN — PREGABALIN 150 MG: 25 CAPSULE ORAL at 08:26

## 2024-01-01 RX ADMIN — CEFTRIAXONE SODIUM 2 G: 2 INJECTION, POWDER, FOR SOLUTION INTRAMUSCULAR; INTRAVENOUS at 20:48

## 2024-01-01 RX ADMIN — PREGABALIN 150 MG: 150 CAPSULE ORAL at 08:40

## 2024-01-01 RX ADMIN — OXYCODONE HYDROCHLORIDE 10 MG: 5 TABLET ORAL at 10:02

## 2024-01-01 RX ADMIN — HYDROMORPHONE HYDROCHLORIDE 0.3 MG: 1 INJECTION, SOLUTION INTRAMUSCULAR; INTRAVENOUS; SUBCUTANEOUS at 21:02

## 2024-01-01 RX ADMIN — HYDROXYZINE HYDROCHLORIDE 50 MG: 25 TABLET, FILM COATED ORAL at 21:17

## 2024-01-01 RX ADMIN — TAMSULOSIN HYDROCHLORIDE 0.4 MG: 0.4 CAPSULE ORAL at 09:28

## 2024-01-01 RX ADMIN — ACETAMINOPHEN 650 MG: 325 TABLET, FILM COATED ORAL at 22:29

## 2024-01-01 RX ADMIN — FENTANYL CITRATE 50 MCG: 50 INJECTION INTRAMUSCULAR; INTRAVENOUS at 14:36

## 2024-01-01 RX ADMIN — ZOLPIDEM TARTRATE 5 MG: 5 TABLET ORAL at 21:16

## 2024-01-01 RX ADMIN — HYDROXYZINE HYDROCHLORIDE 50 MG: 25 TABLET ORAL at 05:51

## 2024-01-01 RX ADMIN — PREDNISONE 5 MG: 5 TABLET ORAL at 08:31

## 2024-01-01 RX ADMIN — ACETAMINOPHEN 650 MG: 325 TABLET, FILM COATED ORAL at 20:16

## 2024-01-01 RX ADMIN — HYDROXYZINE HYDROCHLORIDE 50 MG: 25 TABLET, FILM COATED ORAL at 21:58

## 2024-01-01 RX ADMIN — ROSUVASTATIN CALCIUM 40 MG: 20 TABLET, FILM COATED ORAL at 20:48

## 2024-01-01 RX ADMIN — ACETAMINOPHEN 650 MG: 325 TABLET, FILM COATED ORAL at 06:22

## 2024-01-01 RX ADMIN — PRAMOXINE HYDROCHLORIDE: 10 LOTION TOPICAL at 08:40

## 2024-01-01 RX ADMIN — PRAMOXINE HYDROCHLORIDE: 10 LOTION TOPICAL at 08:50

## 2024-01-01 RX ADMIN — ACETAMINOPHEN 650 MG: 325 TABLET, FILM COATED ORAL at 20:59

## 2024-01-01 RX ADMIN — DIPHENHYDRAMINE HYDROCHLORIDE 25 MG: 25 CAPSULE ORAL at 20:24

## 2024-01-01 RX ADMIN — OXYCODONE HYDROCHLORIDE 10 MG: 5 TABLET ORAL at 12:07

## 2024-01-01 RX ADMIN — LEVOTHYROXINE SODIUM 88 MCG: 0.09 TABLET ORAL at 05:40

## 2024-01-01 RX ADMIN — HYDROMORPHONE HYDROCHLORIDE 0.5 MG: 1 INJECTION, SOLUTION INTRAMUSCULAR; INTRAVENOUS; SUBCUTANEOUS at 03:07

## 2024-01-01 RX ADMIN — ONDANSETRON 4 MG: 4 TABLET, ORALLY DISINTEGRATING ORAL at 21:02

## 2024-01-01 RX ADMIN — ROSUVASTATIN CALCIUM 40 MG: 20 TABLET, FILM COATED ORAL at 08:22

## 2024-01-01 RX ADMIN — PREGABALIN 150 MG: 25 CAPSULE ORAL at 20:33

## 2024-01-01 RX ADMIN — HYDROXYZINE HYDROCHLORIDE 50 MG: 25 TABLET, FILM COATED ORAL at 20:24

## 2024-01-01 RX ADMIN — SODIUM CHLORIDE 250 ML: 9 INJECTION, SOLUTION INTRAVENOUS at 03:48

## 2024-01-01 RX ADMIN — PROPOFOL 150 MG: 10 INJECTION, EMULSION INTRAVENOUS at 14:36

## 2024-01-01 RX ADMIN — OXYCODONE HYDROCHLORIDE 10 MG: 5 TABLET ORAL at 20:00

## 2024-01-01 RX ADMIN — PREGABALIN 150 MG: 25 CAPSULE ORAL at 08:24

## 2024-01-01 RX ADMIN — OXYCODONE HYDROCHLORIDE 5 MG: 5 TABLET ORAL at 04:55

## 2024-01-01 RX ADMIN — OXYCODONE HYDROCHLORIDE 10 MG: 5 TABLET ORAL at 08:19

## 2024-01-01 RX ADMIN — OXYCODONE HYDROCHLORIDE 10 MG: 5 TABLET ORAL at 16:49

## 2024-01-01 RX ADMIN — ACETAMINOPHEN 650 MG: 325 TABLET, FILM COATED ORAL at 23:44

## 2024-01-01 RX ADMIN — TAMSULOSIN HYDROCHLORIDE 0.4 MG: 0.4 CAPSULE ORAL at 08:22

## 2024-01-01 RX ADMIN — SODIUM CHLORIDE 1000 ML: 9 INJECTION, SOLUTION INTRAVENOUS at 13:40

## 2024-01-01 RX ADMIN — PREGABALIN 150 MG: 25 CAPSULE ORAL at 08:25

## 2024-01-01 RX ADMIN — SODIUM CHLORIDE: 9 INJECTION, SOLUTION INTRAVENOUS at 19:19

## 2024-01-01 RX ADMIN — PRAMOXINE HYDROCHLORIDE: 10 LOTION TOPICAL at 08:13

## 2024-01-01 RX ADMIN — OXYCODONE HYDROCHLORIDE 10 MG: 5 TABLET ORAL at 23:43

## 2024-01-01 RX ADMIN — OXYCODONE HYDROCHLORIDE 10 MG: 5 TABLET ORAL at 16:45

## 2024-01-01 RX ADMIN — OXYCODONE HYDROCHLORIDE 10 MG: 5 TABLET ORAL at 16:40

## 2024-01-01 RX ADMIN — TAMSULOSIN HYDROCHLORIDE 0.4 MG: 0.4 CAPSULE ORAL at 13:04

## 2024-01-01 RX ADMIN — HYDROXYZINE HYDROCHLORIDE 50 MG: 25 TABLET ORAL at 01:17

## 2024-01-01 RX ADMIN — ZOLPIDEM TARTRATE 10 MG: 5 TABLET ORAL at 23:44

## 2024-01-01 RX ADMIN — ASPIRIN 81 MG: 81 TABLET, COATED ORAL at 09:46

## 2024-01-01 RX ADMIN — OXYCODONE HYDROCHLORIDE 10 MG: 5 TABLET ORAL at 04:37

## 2024-01-01 RX ADMIN — MORPHINE SULFATE 4 MG: 4 INJECTION, SOLUTION INTRAMUSCULAR; INTRAVENOUS at 22:25

## 2024-01-01 RX ADMIN — DIPHENHYDRAMINE HYDROCHLORIDE 25 MG: 25 CAPSULE ORAL at 21:38

## 2024-01-01 RX ADMIN — LORAZEPAM 0.5 MG: 0.5 TABLET ORAL at 23:20

## 2024-01-01 RX ADMIN — ACETAMINOPHEN 650 MG: 325 TABLET, FILM COATED ORAL at 12:45

## 2024-01-01 RX ADMIN — FENTANYL CITRATE 50 MCG: 50 INJECTION INTRAMUSCULAR; INTRAVENOUS at 14:51

## 2024-01-01 RX ADMIN — ACETAMINOPHEN 650 MG: 325 TABLET, FILM COATED ORAL at 15:59

## 2024-01-01 RX ADMIN — PREDNISONE 20 MG: 20 TABLET ORAL at 08:40

## 2024-01-01 RX ADMIN — HEPARIN SODIUM 1400 UNITS/HR: 10000 INJECTION, SOLUTION INTRAVENOUS at 08:01

## 2024-01-01 RX ADMIN — TAMSULOSIN HYDROCHLORIDE 0.4 MG: 0.4 CAPSULE ORAL at 08:57

## 2024-01-01 RX ADMIN — SODIUM CHLORIDE 1000 ML: 9 INJECTION, SOLUTION INTRAVENOUS at 09:37

## 2024-01-01 RX ADMIN — SENNOSIDES AND DOCUSATE SODIUM 1 TABLET: 8.6; 5 TABLET ORAL at 22:29

## 2024-01-01 RX ADMIN — HYDROMORPHONE HYDROCHLORIDE 0.5 MG: 1 INJECTION, SOLUTION INTRAMUSCULAR; INTRAVENOUS; SUBCUTANEOUS at 08:23

## 2024-01-01 RX ADMIN — ENOXAPARIN SODIUM 70 MG: 80 INJECTION SUBCUTANEOUS at 15:04

## 2024-01-01 RX ADMIN — MORPHINE SULFATE 4 MG: 4 INJECTION, SOLUTION INTRAMUSCULAR; INTRAVENOUS at 02:14

## 2024-01-01 RX ADMIN — LORAZEPAM 1 MG: 2 INJECTION INTRAMUSCULAR; INTRAVENOUS at 10:35

## 2024-01-01 RX ADMIN — LEVOTHYROXINE SODIUM 88 MCG: 88 TABLET ORAL at 06:15

## 2024-01-01 RX ADMIN — OXYCODONE HYDROCHLORIDE 10 MG: 5 TABLET ORAL at 16:44

## 2024-01-01 RX ADMIN — ROSUVASTATIN CALCIUM 40 MG: 20 TABLET, FILM COATED ORAL at 08:26

## 2024-01-01 RX ADMIN — HYDROXYZINE HYDROCHLORIDE 50 MG: 25 TABLET, FILM COATED ORAL at 21:37

## 2024-01-01 RX ADMIN — ACETAMINOPHEN 650 MG: 325 TABLET, FILM COATED ORAL at 21:17

## 2024-01-01 RX ADMIN — HYDROXYZINE HYDROCHLORIDE 50 MG: 25 TABLET, FILM COATED ORAL at 06:21

## 2024-01-01 RX ADMIN — PREDNISONE 10 MG: 5 TABLET ORAL at 08:24

## 2024-01-01 RX ADMIN — OXYCODONE HYDROCHLORIDE 10 MG: 5 TABLET ORAL at 02:43

## 2024-01-01 RX ADMIN — HEPARIN SODIUM 5000 UNITS: 5000 INJECTION, SOLUTION INTRAVENOUS; SUBCUTANEOUS at 14:24

## 2024-01-01 RX ADMIN — ASPIRIN 81 MG: 81 TABLET, COATED ORAL at 08:13

## 2024-01-01 RX ADMIN — HEPARIN SODIUM 5000 UNITS: 5000 INJECTION, SOLUTION INTRAVENOUS; SUBCUTANEOUS at 06:15

## 2024-01-01 RX ADMIN — ROSUVASTATIN CALCIUM 40 MG: 20 TABLET, FILM COATED ORAL at 08:13

## 2024-01-01 RX ADMIN — SODIUM CHLORIDE, POTASSIUM CHLORIDE, SODIUM LACTATE AND CALCIUM CHLORIDE 1000 ML: 600; 310; 30; 20 INJECTION, SOLUTION INTRAVENOUS at 22:54

## 2024-01-01 RX ADMIN — PREGABALIN 150 MG: 25 CAPSULE ORAL at 08:34

## 2024-01-01 RX ADMIN — ROSUVASTATIN CALCIUM 40 MG: 20 TABLET, FILM COATED ORAL at 09:29

## 2024-01-01 RX ADMIN — ENOXAPARIN SODIUM 90 MG: 100 INJECTION SUBCUTANEOUS at 09:53

## 2024-01-01 RX ADMIN — LIDOCAINE HYDROCHLORIDE 100 MG: 20 INJECTION, SOLUTION INFILTRATION; PERINEURAL at 14:36

## 2024-01-01 RX ADMIN — SODIUM CHLORIDE 60 ML: 9 INJECTION, SOLUTION INTRAVENOUS at 13:30

## 2024-01-01 RX ADMIN — PREGABALIN 150 MG: 150 CAPSULE ORAL at 20:21

## 2024-01-01 RX ADMIN — ENOXAPARIN SODIUM 90 MG: 100 INJECTION SUBCUTANEOUS at 21:01

## 2024-01-01 RX ADMIN — ACETAMINOPHEN 650 MG: 325 TABLET, FILM COATED ORAL at 20:20

## 2024-01-01 RX ADMIN — OXYCODONE HYDROCHLORIDE 10 MG: 5 TABLET ORAL at 13:00

## 2024-01-01 RX ADMIN — VANCOMYCIN HYDROCHLORIDE 1500 MG: 1 INJECTION, POWDER, LYOPHILIZED, FOR SOLUTION INTRAVENOUS at 16:28

## 2024-01-01 RX ADMIN — ZOLPIDEM TARTRATE 5 MG: 5 TABLET ORAL at 22:03

## 2024-01-01 RX ADMIN — VANCOMYCIN HYDROCHLORIDE 1500 MG: 1 INJECTION, POWDER, LYOPHILIZED, FOR SOLUTION INTRAVENOUS at 15:26

## 2024-01-01 RX ADMIN — PREGABALIN 150 MG: 150 CAPSULE ORAL at 21:17

## 2024-01-01 RX ADMIN — ZOLPIDEM TARTRATE 10 MG: 5 TABLET ORAL at 21:02

## 2024-01-01 RX ADMIN — HEPARIN SODIUM 5000 UNITS: 5000 INJECTION, SOLUTION INTRAVENOUS; SUBCUTANEOUS at 14:31

## 2024-01-01 RX ADMIN — ACETAMINOPHEN 650 MG: 325 TABLET, FILM COATED ORAL at 16:44

## 2024-01-01 RX ADMIN — PREGABALIN 150 MG: 150 CAPSULE ORAL at 08:57

## 2024-01-01 RX ADMIN — ZOLPIDEM TARTRATE 10 MG: 5 TABLET ORAL at 20:59

## 2024-01-01 RX ADMIN — ACETAMINOPHEN 650 MG: 325 TABLET, FILM COATED ORAL at 10:02

## 2024-01-01 RX ADMIN — IOPAMIDOL 90 ML: 755 INJECTION, SOLUTION INTRAVENOUS at 13:29

## 2024-01-01 RX ADMIN — PRAMOXINE HYDROCHLORIDE: 10 LOTION TOPICAL at 16:13

## 2024-01-01 RX ADMIN — FAMOTIDINE 20 MG: 20 TABLET, FILM COATED ORAL at 14:29

## 2024-01-01 RX ADMIN — OXYCODONE HYDROCHLORIDE 10 MG: 5 TABLET ORAL at 22:29

## 2024-01-01 RX ADMIN — OXYCODONE HYDROCHLORIDE 10 MG: 5 TABLET ORAL at 22:03

## 2024-01-01 RX ADMIN — PREGABALIN 150 MG: 25 CAPSULE ORAL at 09:28

## 2024-01-01 RX ADMIN — OXYCODONE HYDROCHLORIDE 10 MG: 5 TABLET ORAL at 16:18

## 2024-01-01 RX ADMIN — ZOLPIDEM TARTRATE 10 MG: 5 TABLET ORAL at 22:29

## 2024-01-01 RX ADMIN — PREGABALIN 150 MG: 25 CAPSULE ORAL at 21:00

## 2024-01-01 RX ADMIN — DIPHENHYDRAMINE HYDROCHLORIDE 25 MG: 25 CAPSULE ORAL at 21:16

## 2024-01-01 RX ADMIN — PREDNISONE 20 MG: 20 TABLET ORAL at 08:05

## 2024-01-01 RX ADMIN — ZOLPIDEM TARTRATE 5 MG: 5 TABLET ORAL at 21:02

## 2024-01-01 RX ADMIN — LEVOTHYROXINE SODIUM 88 MCG: 0.09 TABLET ORAL at 07:13

## 2024-01-01 RX ADMIN — PRAMOXINE HYDROCHLORIDE: 10 LOTION TOPICAL at 21:05

## 2024-01-01 RX ADMIN — HYDROXYZINE HYDROCHLORIDE 50 MG: 25 TABLET, FILM COATED ORAL at 03:26

## 2024-01-01 RX ADMIN — HEPARIN SODIUM 5000 UNITS: 5000 INJECTION, SOLUTION INTRAVENOUS; SUBCUTANEOUS at 22:03

## 2024-01-01 RX ADMIN — PREGABALIN 150 MG: 25 CAPSULE ORAL at 22:28

## 2024-01-01 RX ADMIN — HYDROXYZINE HYDROCHLORIDE 50 MG: 25 TABLET ORAL at 08:34

## 2024-01-01 RX ADMIN — LEVOTHYROXINE SODIUM 88 MCG: 88 TABLET ORAL at 06:34

## 2024-01-01 RX ADMIN — VANCOMYCIN HYDROCHLORIDE 1250 MG: 10 INJECTION, POWDER, LYOPHILIZED, FOR SOLUTION INTRAVENOUS at 22:33

## 2024-01-01 RX ADMIN — OXYCODONE HYDROCHLORIDE 10 MG: 5 TABLET ORAL at 12:36

## 2024-01-01 RX ADMIN — HEPARIN, PORCINE (PF) 10 UNIT/ML INTRAVENOUS SYRINGE 5 ML: at 15:33

## 2024-01-01 RX ADMIN — SODIUM CHLORIDE 1000 ML: 9 INJECTION, SOLUTION INTRAVENOUS at 20:51

## 2024-01-01 RX ADMIN — ACETAMINOPHEN 650 MG: 325 TABLET, FILM COATED ORAL at 22:02

## 2024-01-01 RX ADMIN — POTASSIUM CHLORIDE 40 MEQ: 1500 TABLET, EXTENDED RELEASE ORAL at 19:59

## 2024-01-01 RX ADMIN — LEVOTHYROXINE SODIUM 88 MCG: 0.09 TABLET ORAL at 05:36

## 2024-01-01 RX ADMIN — SODIUM CHLORIDE 1000 ML: 9 INJECTION, SOLUTION INTRAVENOUS at 12:38

## 2024-01-01 RX ADMIN — HEPARIN SODIUM 5000 UNITS: 5000 INJECTION, SOLUTION INTRAVENOUS; SUBCUTANEOUS at 21:33

## 2024-01-01 RX ADMIN — HYDROXYZINE HYDROCHLORIDE 50 MG: 25 TABLET ORAL at 20:00

## 2024-01-01 RX ADMIN — OXYCODONE HYDROCHLORIDE 10 MG: 5 TABLET ORAL at 01:49

## 2024-01-01 RX ADMIN — PRAMOXINE HYDROCHLORIDE: 10 LOTION TOPICAL at 22:02

## 2024-01-01 RX ADMIN — ONDANSETRON 4 MG: 2 INJECTION INTRAMUSCULAR; INTRAVENOUS at 14:50

## 2024-01-01 RX ADMIN — PREGABALIN 150 MG: 25 CAPSULE ORAL at 20:01

## 2024-01-01 RX ADMIN — OXYCODONE HYDROCHLORIDE 10 MG: 5 TABLET ORAL at 16:13

## 2024-01-01 RX ADMIN — VANCOMYCIN HYDROCHLORIDE 1500 MG: 1 INJECTION, POWDER, LYOPHILIZED, FOR SOLUTION INTRAVENOUS at 16:48

## 2024-01-01 RX ADMIN — LEVOTHYROXINE SODIUM 88 MCG: 0.09 TABLET ORAL at 06:02

## 2024-01-01 RX ADMIN — OXYCODONE HYDROCHLORIDE 10 MG: 5 TABLET ORAL at 06:12

## 2024-01-01 RX ADMIN — ACETAMINOPHEN 650 MG: 325 TABLET, FILM COATED ORAL at 08:13

## 2024-01-01 RX ADMIN — ROSUVASTATIN CALCIUM 40 MG: 20 TABLET, FILM COATED ORAL at 08:24

## 2024-01-01 RX ADMIN — SODIUM CHLORIDE 60 ML: 9 INJECTION, SOLUTION INTRAVENOUS at 09:05

## 2024-01-01 RX ADMIN — PRAMOXINE HYDROCHLORIDE: 10 LOTION TOPICAL at 08:07

## 2024-01-01 RX ADMIN — ASPIRIN 81 MG: 81 TABLET, COATED ORAL at 08:05

## 2024-01-01 RX ADMIN — PRAMOXINE HYDROCHLORIDE: 10 LOTION TOPICAL at 21:19

## 2024-01-01 RX ADMIN — ROSUVASTATIN CALCIUM 40 MG: 20 TABLET, FILM COATED ORAL at 08:12

## 2024-01-01 RX ADMIN — SODIUM CHLORIDE, POTASSIUM CHLORIDE, SODIUM LACTATE AND CALCIUM CHLORIDE: 600; 310; 30; 20 INJECTION, SOLUTION INTRAVENOUS at 21:30

## 2024-01-01 RX ADMIN — ACETAMINOPHEN 650 MG: 325 TABLET, FILM COATED ORAL at 16:13

## 2024-01-01 RX ADMIN — OXYCODONE HYDROCHLORIDE 10 MG: 5 TABLET ORAL at 20:13

## 2024-01-01 RX ADMIN — ROSUVASTATIN CALCIUM 40 MG: 20 TABLET, FILM COATED ORAL at 09:46

## 2024-01-01 RX ADMIN — OXYCODONE HYDROCHLORIDE 10 MG: 5 TABLET ORAL at 12:45

## 2024-01-01 RX ADMIN — SENNOSIDES AND DOCUSATE SODIUM 1 TABLET: 8.6; 5 TABLET ORAL at 06:12

## 2024-01-01 RX ADMIN — IOPAMIDOL 86 ML: 755 INJECTION, SOLUTION INTRAVENOUS at 14:47

## 2024-01-01 RX ADMIN — VANCOMYCIN HYDROCHLORIDE 1250 MG: 1 INJECTION, POWDER, LYOPHILIZED, FOR SOLUTION INTRAVENOUS at 22:18

## 2024-01-01 RX ADMIN — HYDROXYZINE HYDROCHLORIDE 50 MG: 25 TABLET ORAL at 12:46

## 2024-01-01 RX ADMIN — LORAZEPAM 0.5 MG: 0.5 TABLET ORAL at 05:52

## 2024-01-01 RX ADMIN — MORPHINE SULFATE 4 MG: 4 INJECTION, SOLUTION INTRAMUSCULAR; INTRAVENOUS at 07:35

## 2024-01-01 RX ADMIN — HEPARIN SODIUM 5000 UNITS: 5000 INJECTION, SOLUTION INTRAVENOUS; SUBCUTANEOUS at 06:42

## 2024-01-01 RX ADMIN — ACETAMINOPHEN 650 MG: 325 TABLET, FILM COATED ORAL at 16:57

## 2024-01-01 RX ADMIN — LIDOCAINE HYDROCHLORIDE 2 ML: 10 INJECTION, SOLUTION EPIDURAL; INFILTRATION; INTRACAUDAL; PERINEURAL at 13:28

## 2024-01-01 RX ADMIN — OXYCODONE HYDROCHLORIDE 10 MG: 5 TABLET ORAL at 06:22

## 2024-01-01 RX ADMIN — SODIUM CHLORIDE 59 ML: 9 INJECTION, SOLUTION INTRAVENOUS at 14:46

## 2024-01-01 RX ADMIN — SODIUM CHLORIDE: 9 INJECTION, SOLUTION INTRAVENOUS at 17:07

## 2024-01-01 RX ADMIN — LORAZEPAM 0.5 MG: 2 INJECTION INTRAMUSCULAR; INTRAVENOUS at 11:36

## 2024-01-01 RX ADMIN — HYDROXYZINE HYDROCHLORIDE 50 MG: 25 TABLET ORAL at 08:44

## 2024-01-01 RX ADMIN — OXYCODONE HYDROCHLORIDE 10 MG: 5 TABLET ORAL at 21:33

## 2024-01-01 RX ADMIN — OXYCODONE HYDROCHLORIDE 10 MG: 5 TABLET ORAL at 01:36

## 2024-01-01 RX ADMIN — SODIUM CHLORIDE 60 ML: 9 INJECTION, SOLUTION INTRAVENOUS at 14:23

## 2024-01-01 RX ADMIN — OXYCODONE HYDROCHLORIDE 10 MG: 5 TABLET ORAL at 20:59

## 2024-01-01 RX ADMIN — VANCOMYCIN HYDROCHLORIDE 1500 MG: 1 INJECTION, POWDER, LYOPHILIZED, FOR SOLUTION INTRAVENOUS at 15:46

## 2024-01-01 RX ADMIN — LEVOTHYROXINE SODIUM 88 MCG: 0.09 TABLET ORAL at 06:41

## 2024-01-01 RX ADMIN — SUGAMMADEX 200 MG: 100 INJECTION, SOLUTION INTRAVENOUS at 14:56

## 2024-01-01 RX ADMIN — VANCOMYCIN HYDROCHLORIDE 1250 MG: 10 INJECTION, POWDER, LYOPHILIZED, FOR SOLUTION INTRAVENOUS at 23:38

## 2024-01-01 RX ADMIN — HYDROXYZINE HYDROCHLORIDE 50 MG: 25 TABLET, FILM COATED ORAL at 20:48

## 2024-01-01 RX ADMIN — HEPARIN SODIUM 1100 UNITS/HR: 10000 INJECTION, SOLUTION INTRAVENOUS at 17:28

## 2024-01-01 RX ADMIN — ACETAMINOPHEN 650 MG: 325 TABLET, FILM COATED ORAL at 21:00

## 2024-01-01 RX ADMIN — PREDNISONE 20 MG: 20 TABLET ORAL at 08:57

## 2024-01-01 RX ADMIN — PREDNISONE 5 MG: 5 TABLET ORAL at 08:22

## 2024-01-01 RX ADMIN — CEFTRIAXONE SODIUM 1 G: 1 INJECTION, POWDER, FOR SOLUTION INTRAMUSCULAR; INTRAVENOUS at 21:46

## 2024-01-01 RX ADMIN — LEVOTHYROXINE SODIUM 88 MCG: 0.09 TABLET ORAL at 05:51

## 2024-01-01 RX ADMIN — ACETAMINOPHEN 650 MG: 325 TABLET, FILM COATED ORAL at 08:44

## 2024-01-01 RX ADMIN — OXYCODONE HYDROCHLORIDE 10 MG: 5 TABLET ORAL at 12:10

## 2024-01-01 RX ADMIN — ALBUMIN HUMAN 12.5 G: 0.05 INJECTION, SOLUTION INTRAVENOUS at 11:36

## 2024-01-01 RX ADMIN — IOPAMIDOL 100 ML: 755 INJECTION, SOLUTION INTRAVENOUS at 09:06

## 2024-01-01 RX ADMIN — TAMSULOSIN HYDROCHLORIDE 0.4 MG: 0.4 CAPSULE ORAL at 08:13

## 2024-01-01 RX ADMIN — HYDROMORPHONE HYDROCHLORIDE 0.5 MG: 1 INJECTION, SOLUTION INTRAMUSCULAR; INTRAVENOUS; SUBCUTANEOUS at 09:23

## 2024-01-01 RX ADMIN — ACETAMINOPHEN 650 MG: 325 TABLET, FILM COATED ORAL at 13:03

## 2024-01-01 RX ADMIN — PREGABALIN 150 MG: 150 CAPSULE ORAL at 09:46

## 2024-01-01 RX ADMIN — HEPARIN SODIUM 5000 UNITS: 5000 INJECTION, SOLUTION INTRAVENOUS; SUBCUTANEOUS at 21:57

## 2024-01-01 RX ADMIN — SODIUM CHLORIDE 1000 ML: 9 INJECTION, SOLUTION INTRAVENOUS at 18:32

## 2024-01-01 RX ADMIN — HEPARIN SODIUM 600 UNITS/HR: 10000 INJECTION, SOLUTION INTRAVENOUS at 14:37

## 2024-01-01 RX ADMIN — IOPAMIDOL 90 ML: 755 INJECTION, SOLUTION INTRAVENOUS at 14:23

## 2024-01-01 RX ADMIN — HEPARIN SODIUM 1100 UNITS/HR: 10000 INJECTION, SOLUTION INTRAVENOUS at 11:06

## 2024-01-01 RX ADMIN — ACETAMINOPHEN 650 MG: 325 TABLET, FILM COATED ORAL at 08:40

## 2024-01-01 RX ADMIN — HEPARIN, PORCINE (PF) 10 UNIT/ML INTRAVENOUS SYRINGE 5 ML: at 12:07

## 2024-01-01 RX ADMIN — VANCOMYCIN HYDROCHLORIDE 1250 MG: 10 INJECTION, POWDER, LYOPHILIZED, FOR SOLUTION INTRAVENOUS at 00:23

## 2024-01-01 RX ADMIN — PRAMOXINE HYDROCHLORIDE: 10 LOTION TOPICAL at 16:53

## 2024-01-01 RX ADMIN — MORPHINE SULFATE 4 MG: 4 INJECTION, SOLUTION INTRAMUSCULAR; INTRAVENOUS at 14:43

## 2024-01-01 RX ADMIN — DIPHENHYDRAMINE HYDROCHLORIDE 25 MG: 25 CAPSULE ORAL at 06:46

## 2024-01-01 RX ADMIN — HYDROXYZINE HYDROCHLORIDE 50 MG: 25 TABLET, FILM COATED ORAL at 06:46

## 2024-01-01 RX ADMIN — ACETAMINOPHEN 650 MG: 325 TABLET, FILM COATED ORAL at 16:49

## 2024-01-01 RX ADMIN — HYDROXYZINE HYDROCHLORIDE 25 MG: 25 TABLET, FILM COATED ORAL at 07:35

## 2024-01-01 RX ADMIN — ZOLPIDEM TARTRATE 5 MG: 5 TABLET ORAL at 21:33

## 2024-01-01 RX ADMIN — PREGABALIN 150 MG: 150 CAPSULE ORAL at 22:00

## 2024-01-01 RX ADMIN — SODIUM CHLORIDE: 9 INJECTION, SOLUTION INTRAVENOUS at 08:21

## 2024-01-01 RX ADMIN — HYDROMORPHONE HYDROCHLORIDE 0.5 MG: 1 INJECTION, SOLUTION INTRAMUSCULAR; INTRAVENOUS; SUBCUTANEOUS at 17:06

## 2024-01-01 RX ADMIN — HEPARIN SODIUM 5000 UNITS: 5000 INJECTION, SOLUTION INTRAVENOUS; SUBCUTANEOUS at 06:31

## 2024-01-01 RX ADMIN — SODIUM CHLORIDE 1000 ML: 9 INJECTION, SOLUTION INTRAVENOUS at 02:04

## 2024-01-01 RX ADMIN — METOPROLOL SUCCINATE 25 MG: 25 TABLET, EXTENDED RELEASE ORAL at 08:25

## 2024-01-01 RX ADMIN — DEXAMETHASONE SODIUM PHOSPHATE 4 MG: 4 INJECTION, SOLUTION INTRA-ARTICULAR; INTRALESIONAL; INTRAMUSCULAR; INTRAVENOUS; SOFT TISSUE at 14:50

## 2024-01-01 RX ADMIN — LEVOTHYROXINE SODIUM 88 MCG: 88 TABLET ORAL at 06:10

## 2024-01-01 RX ADMIN — ACETAMINOPHEN 650 MG: 325 TABLET, FILM COATED ORAL at 08:34

## 2024-01-01 RX ADMIN — PREGABALIN 150 MG: 25 CAPSULE ORAL at 08:12

## 2024-01-01 RX ADMIN — PREGABALIN 150 MG: 150 CAPSULE ORAL at 08:13

## 2024-01-01 RX ADMIN — ALTEPLASE 2 MG: 2.2 INJECTION, POWDER, LYOPHILIZED, FOR SOLUTION INTRAVENOUS at 09:41

## 2024-01-01 RX ADMIN — HYDROXYZINE HYDROCHLORIDE 50 MG: 25 TABLET ORAL at 16:45

## 2024-01-01 RX ADMIN — OXYCODONE HYDROCHLORIDE 10 MG: 5 TABLET ORAL at 08:40

## 2024-01-01 RX ADMIN — ACETAMINOPHEN 650 MG: 325 TABLET, FILM COATED ORAL at 21:57

## 2024-01-01 RX ADMIN — HYDROXYZINE HYDROCHLORIDE 50 MG: 25 TABLET, FILM COATED ORAL at 08:13

## 2024-01-01 RX ADMIN — VANCOMYCIN HYDROCHLORIDE 1000 MG: 1 INJECTION, SOLUTION INTRAVENOUS at 23:25

## 2024-01-01 RX ADMIN — ZOLPIDEM TARTRATE 10 MG: 5 TABLET ORAL at 00:30

## 2024-01-01 RX ADMIN — HEPARIN SODIUM 5000 UNITS: 5000 INJECTION, SOLUTION INTRAVENOUS; SUBCUTANEOUS at 21:16

## 2024-01-01 RX ADMIN — ZOLPIDEM TARTRATE 10 MG: 5 TABLET ORAL at 22:34

## 2024-01-01 RX ADMIN — HYDROXYZINE HYDROCHLORIDE 50 MG: 25 TABLET ORAL at 08:13

## 2024-01-01 RX ADMIN — PREGABALIN 150 MG: 150 CAPSULE ORAL at 08:06

## 2024-01-01 RX ADMIN — OXYCODONE HYDROCHLORIDE 10 MG: 5 TABLET ORAL at 12:29

## 2024-01-01 RX ADMIN — ROSUVASTATIN CALCIUM 40 MG: 20 TABLET, FILM COATED ORAL at 08:05

## 2024-01-01 RX ADMIN — HYDROMORPHONE HYDROCHLORIDE 0.5 MG: 1 INJECTION, SOLUTION INTRAMUSCULAR; INTRAVENOUS; SUBCUTANEOUS at 10:32

## 2024-01-01 RX ADMIN — ACETAMINOPHEN 650 MG: 325 TABLET, FILM COATED ORAL at 01:52

## 2024-01-01 RX ADMIN — HYDROMORPHONE HYDROCHLORIDE 1 MG: 1 INJECTION, SOLUTION INTRAMUSCULAR; INTRAVENOUS; SUBCUTANEOUS at 12:46

## 2024-01-01 RX ADMIN — ROSUVASTATIN CALCIUM 40 MG: 20 TABLET, FILM COATED ORAL at 08:57

## 2024-01-01 RX ADMIN — HEPARIN SODIUM 5000 UNITS: 5000 INJECTION, SOLUTION INTRAVENOUS; SUBCUTANEOUS at 06:10

## 2024-01-01 RX ADMIN — PREGABALIN 150 MG: 25 CAPSULE ORAL at 20:59

## 2024-01-01 RX ADMIN — ACETAMINOPHEN 650 MG: 325 TABLET, FILM COATED ORAL at 02:43

## 2024-01-01 RX ADMIN — ACETAMINOPHEN 650 MG: 325 TABLET, FILM COATED ORAL at 16:45

## 2024-01-01 RX ADMIN — PREGABALIN 150 MG: 150 CAPSULE ORAL at 21:57

## 2024-01-01 RX ADMIN — OXYCODONE HYDROCHLORIDE 10 MG: 5 TABLET ORAL at 08:33

## 2024-01-01 RX ADMIN — HYDROXYZINE HYDROCHLORIDE 50 MG: 25 TABLET ORAL at 22:30

## 2024-01-01 RX ADMIN — HYDROXYZINE HYDROCHLORIDE 25 MG: 25 TABLET ORAL at 10:02

## 2024-01-01 RX ADMIN — SODIUM CHLORIDE, POTASSIUM CHLORIDE, SODIUM LACTATE AND CALCIUM CHLORIDE: 600; 310; 30; 20 INJECTION, SOLUTION INTRAVENOUS at 20:35

## 2024-01-01 RX ADMIN — HYDROXYZINE HYDROCHLORIDE 50 MG: 25 TABLET ORAL at 13:00

## 2024-01-01 RX ADMIN — ROSUVASTATIN CALCIUM 40 MG: 20 TABLET, FILM COATED ORAL at 08:40

## 2024-01-01 RX ADMIN — PREGABALIN 150 MG: 150 CAPSULE ORAL at 20:13

## 2024-01-01 RX ADMIN — ENOXAPARIN SODIUM 90 MG: 100 INJECTION SUBCUTANEOUS at 08:12

## 2024-01-01 RX ADMIN — OXYCODONE HYDROCHLORIDE 10 MG: 5 TABLET ORAL at 08:24

## 2024-01-01 RX ADMIN — OXYCODONE HYDROCHLORIDE 10 MG: 5 TABLET ORAL at 22:34

## 2024-01-01 RX ADMIN — OXYCODONE HYDROCHLORIDE 10 MG: 5 TABLET ORAL at 03:06

## 2024-01-01 RX ADMIN — ACETAMINOPHEN 650 MG: 325 TABLET, FILM COATED ORAL at 04:37

## 2024-01-01 RX ADMIN — ACETAMINOPHEN 650 MG: 325 TABLET, FILM COATED ORAL at 05:36

## 2024-01-01 RX ADMIN — HYDROXYZINE HYDROCHLORIDE 50 MG: 25 TABLET ORAL at 20:59

## 2024-01-01 RX ADMIN — HEPARIN SODIUM 5000 UNITS: 5000 INJECTION, SOLUTION INTRAVENOUS; SUBCUTANEOUS at 14:49

## 2024-01-01 RX ADMIN — OXYCODONE HYDROCHLORIDE 10 MG: 5 TABLET ORAL at 14:49

## 2024-01-01 RX ADMIN — SENNOSIDES AND DOCUSATE SODIUM 1 TABLET: 8.6; 5 TABLET ORAL at 05:29

## 2024-01-01 RX ADMIN — TAMSULOSIN HYDROCHLORIDE 0.4 MG: 0.4 CAPSULE ORAL at 08:06

## 2024-01-01 RX ADMIN — LORAZEPAM 0.5 MG: 0.5 TABLET ORAL at 12:07

## 2024-01-01 RX ADMIN — PRAMOXINE HYDROCHLORIDE: 10 LOTION TOPICAL at 15:03

## 2024-01-01 RX ADMIN — DIPHENHYDRAMINE HYDROCHLORIDE 25 MG: 25 CAPSULE ORAL at 21:58

## 2024-01-01 RX ADMIN — ACETAMINOPHEN 650 MG: 325 TABLET, FILM COATED ORAL at 07:39

## 2024-01-01 RX ADMIN — LEVOTHYROXINE SODIUM 88 MCG: 88 TABLET ORAL at 06:42

## 2024-01-01 RX ADMIN — SODIUM CHLORIDE 1000 ML: 9 INJECTION, SOLUTION INTRAVENOUS at 22:22

## 2024-01-01 RX ADMIN — HEPARIN SODIUM 5000 UNITS: 5000 INJECTION, SOLUTION INTRAVENOUS; SUBCUTANEOUS at 06:34

## 2024-01-01 RX ADMIN — OXYCODONE HYDROCHLORIDE 10 MG: 5 TABLET ORAL at 00:24

## 2024-01-01 RX ADMIN — OXYCODONE HYDROCHLORIDE 10 MG: 5 TABLET ORAL at 08:13

## 2024-01-01 RX ADMIN — TAMSULOSIN HYDROCHLORIDE 0.4 MG: 0.4 CAPSULE ORAL at 08:40

## 2024-01-01 RX ADMIN — MORPHINE SULFATE 4 MG: 4 INJECTION, SOLUTION INTRAMUSCULAR; INTRAVENOUS at 21:00

## 2024-01-01 RX ADMIN — OXYCODONE HYDROCHLORIDE 10 MG: 5 TABLET ORAL at 06:43

## 2024-01-01 RX ADMIN — OXYCODONE HYDROCHLORIDE 10 MG: 5 TABLET ORAL at 21:58

## 2024-01-01 RX ADMIN — ASPIRIN 81 MG: 81 TABLET, COATED ORAL at 08:40

## 2024-01-01 RX ADMIN — METOPROLOL SUCCINATE 25 MG: 25 TABLET, EXTENDED RELEASE ORAL at 08:57

## 2024-01-01 RX ADMIN — OXYCODONE HYDROCHLORIDE 10 MG: 5 TABLET ORAL at 21:17

## 2024-01-01 RX ADMIN — METOPROLOL SUCCINATE 12.5 MG: 25 TABLET, EXTENDED RELEASE ORAL at 10:21

## 2024-01-01 RX ADMIN — SODIUM CHLORIDE, POTASSIUM CHLORIDE, SODIUM LACTATE AND CALCIUM CHLORIDE: 600; 310; 30; 20 INJECTION, SOLUTION INTRAVENOUS at 14:26

## 2024-01-01 RX ADMIN — CALCIUM CARBONATE (ANTACID) CHEW TAB 500 MG 1000 MG: 500 CHEW TAB at 15:03

## 2024-01-01 RX ADMIN — VANCOMYCIN HYDROCHLORIDE 1250 MG: 10 INJECTION, POWDER, LYOPHILIZED, FOR SOLUTION INTRAVENOUS at 16:05

## 2024-01-01 RX ADMIN — PRAMOXINE HYDROCHLORIDE: 10 LOTION TOPICAL at 16:19

## 2024-01-01 RX ADMIN — ACETAMINOPHEN 650 MG: 325 TABLET, FILM COATED ORAL at 16:40

## 2024-01-01 RX ADMIN — HEPARIN SODIUM 5000 UNITS: 5000 INJECTION, SOLUTION INTRAVENOUS; SUBCUTANEOUS at 13:05

## 2024-01-01 RX ADMIN — VANCOMYCIN HYDROCHLORIDE 1250 MG: 10 INJECTION, POWDER, LYOPHILIZED, FOR SOLUTION INTRAVENOUS at 23:34

## 2024-01-01 RX ADMIN — ROCURONIUM BROMIDE 40 MG: 50 INJECTION, SOLUTION INTRAVENOUS at 14:36

## 2024-01-01 RX ADMIN — ACETAMINOPHEN 650 MG: 325 TABLET, FILM COATED ORAL at 12:29

## 2024-01-01 RX ADMIN — SODIUM CHLORIDE, POTASSIUM CHLORIDE, SODIUM LACTATE AND CALCIUM CHLORIDE: 600; 310; 30; 20 INJECTION, SOLUTION INTRAVENOUS at 03:23

## 2024-01-01 RX ADMIN — PREDNISONE 20 MG: 20 TABLET ORAL at 10:19

## 2024-01-01 RX ADMIN — LEVOTHYROXINE SODIUM 88 MCG: 88 TABLET ORAL at 06:31

## 2024-01-01 RX ADMIN — OXYCODONE HYDROCHLORIDE 10 MG: 5 TABLET ORAL at 15:58

## 2024-01-01 RX ADMIN — ACETAMINOPHEN 650 MG: 325 TABLET, FILM COATED ORAL at 12:36

## 2024-01-01 RX ADMIN — HYDROXYZINE HYDROCHLORIDE 50 MG: 25 TABLET ORAL at 21:00

## 2024-01-01 ASSESSMENT — ACTIVITIES OF DAILY LIVING (ADL)
ADLS_ACUITY_SCORE: 25
ADLS_ACUITY_SCORE: 23
ADLS_ACUITY_SCORE: 26
ADLS_ACUITY_SCORE: 22
ADLS_ACUITY_SCORE: 28
ADLS_ACUITY_SCORE: 28
ADLS_ACUITY_SCORE: 23
ADLS_ACUITY_SCORE: 28
ADLS_ACUITY_SCORE: 22
ADLS_ACUITY_SCORE: 35
ADLS_ACUITY_SCORE: 25
ADLS_ACUITY_SCORE: 23
ADLS_ACUITY_SCORE: 22
ADLS_ACUITY_SCORE: 38
ADLS_ACUITY_SCORE: 26
ADLS_ACUITY_SCORE: 38
ADLS_ACUITY_SCORE: 35
ADLS_ACUITY_SCORE: 35
ADLS_ACUITY_SCORE: 25
ADLS_ACUITY_SCORE: 25
ADLS_ACUITY_SCORE: 37
ADLS_ACUITY_SCORE: 25
ADLS_ACUITY_SCORE: 22
ADLS_ACUITY_SCORE: 23
ADLS_ACUITY_SCORE: 28
ADLS_ACUITY_SCORE: 38
ADLS_ACUITY_SCORE: 25
ADLS_ACUITY_SCORE: 25
ADLS_ACUITY_SCORE: 26
ADLS_ACUITY_SCORE: 25
ADLS_ACUITY_SCORE: 22
ADLS_ACUITY_SCORE: 23
DEPENDENT_IADLS:: INDEPENDENT
ADLS_ACUITY_SCORE: 25
ADLS_ACUITY_SCORE: 28
ADLS_ACUITY_SCORE: 23
ADLS_ACUITY_SCORE: 35
ADLS_ACUITY_SCORE: 25
ADLS_ACUITY_SCORE: 23
ADLS_ACUITY_SCORE: 25
ADLS_ACUITY_SCORE: 23
DEPENDENT_IADLS:: INDEPENDENT
ADLS_ACUITY_SCORE: 38
ADLS_ACUITY_SCORE: 23
ADLS_ACUITY_SCORE: 25
ADLS_ACUITY_SCORE: 26
ADLS_ACUITY_SCORE: 26
ADLS_ACUITY_SCORE: 23
ADLS_ACUITY_SCORE: 23
ADLS_ACUITY_SCORE: 25
ADLS_ACUITY_SCORE: 23
ADLS_ACUITY_SCORE: 25
ADLS_ACUITY_SCORE: 26
ADLS_ACUITY_SCORE: 22
ADLS_ACUITY_SCORE: 25
ADLS_ACUITY_SCORE: 23
ADLS_ACUITY_SCORE: 23
ADLS_ACUITY_SCORE: 26
ADLS_ACUITY_SCORE: 26
ADLS_ACUITY_SCORE: 23
ADLS_ACUITY_SCORE: 22
ADLS_ACUITY_SCORE: 25
ADLS_ACUITY_SCORE: 36
ADLS_ACUITY_SCORE: 38
ADLS_ACUITY_SCORE: 25
ADLS_ACUITY_SCORE: 23
ADLS_ACUITY_SCORE: 38
ADLS_ACUITY_SCORE: 28
ADLS_ACUITY_SCORE: 35
ADLS_ACUITY_SCORE: 23
ADLS_ACUITY_SCORE: 25
ADLS_ACUITY_SCORE: 23
ADLS_ACUITY_SCORE: 28
ADLS_ACUITY_SCORE: 23
ADLS_ACUITY_SCORE: 22
ADLS_ACUITY_SCORE: 35
ADLS_ACUITY_SCORE: 23
ADLS_ACUITY_SCORE: 35
ADLS_ACUITY_SCORE: 26
ADLS_ACUITY_SCORE: 35
ADLS_ACUITY_SCORE: 23
ADLS_ACUITY_SCORE: 26
ADLS_ACUITY_SCORE: 23
ADLS_ACUITY_SCORE: 23
ADLS_ACUITY_SCORE: 22
ADLS_ACUITY_SCORE: 23
ADLS_ACUITY_SCORE: 23
ADLS_ACUITY_SCORE: 28
ADLS_ACUITY_SCORE: 23
ADLS_ACUITY_SCORE: 22
ADLS_ACUITY_SCORE: 26
ADLS_ACUITY_SCORE: 25
ADLS_ACUITY_SCORE: 38
ADLS_ACUITY_SCORE: 35
ADLS_ACUITY_SCORE: 26
ADLS_ACUITY_SCORE: 23
ADLS_ACUITY_SCORE: 25
ADLS_ACUITY_SCORE: 22
ADLS_ACUITY_SCORE: 23
DEPENDENT_IADLS:: INDEPENDENT
ADLS_ACUITY_SCORE: 25
ADLS_ACUITY_SCORE: 26
ADLS_ACUITY_SCORE: 23
ADLS_ACUITY_SCORE: 35
ADLS_ACUITY_SCORE: 25
ADLS_ACUITY_SCORE: 35
ADLS_ACUITY_SCORE: 25
ADLS_ACUITY_SCORE: 25
ADLS_ACUITY_SCORE: 23
ADLS_ACUITY_SCORE: 23
ADLS_ACUITY_SCORE: 35
ADLS_ACUITY_SCORE: 35
ADLS_ACUITY_SCORE: 23
ADLS_ACUITY_SCORE: 25
ADLS_ACUITY_SCORE: 25
ADLS_ACUITY_SCORE: 23
ADLS_ACUITY_SCORE: 26
ADLS_ACUITY_SCORE: 25
ADLS_ACUITY_SCORE: 23
ADLS_ACUITY_SCORE: 25
ADLS_ACUITY_SCORE: 38
ADLS_ACUITY_SCORE: 35
ADLS_ACUITY_SCORE: 28
ADLS_ACUITY_SCORE: 23
ADLS_ACUITY_SCORE: 28
ADLS_ACUITY_SCORE: 26
ADLS_ACUITY_SCORE: 22
ADLS_ACUITY_SCORE: 25
ADLS_ACUITY_SCORE: 20
ADLS_ACUITY_SCORE: 23
ADLS_ACUITY_SCORE: 26
ADLS_ACUITY_SCORE: 23
ADLS_ACUITY_SCORE: 20
ADLS_ACUITY_SCORE: 28
ADLS_ACUITY_SCORE: 25
ADLS_ACUITY_SCORE: 23
ADLS_ACUITY_SCORE: 26
ADLS_ACUITY_SCORE: 35
ADLS_ACUITY_SCORE: 23
ADLS_ACUITY_SCORE: 25
DEPENDENT_IADLS:: INDEPENDENT
ADLS_ACUITY_SCORE: 28
ADLS_ACUITY_SCORE: 22
ADLS_ACUITY_SCORE: 22
ADLS_ACUITY_SCORE: 26
ADLS_ACUITY_SCORE: 37
ADLS_ACUITY_SCORE: 35
ADLS_ACUITY_SCORE: 25
ADLS_ACUITY_SCORE: 23
DEPENDENT_IADLS:: INDEPENDENT
ADLS_ACUITY_SCORE: 23
ADLS_ACUITY_SCORE: 26
ADLS_ACUITY_SCORE: 35
ADLS_ACUITY_SCORE: 23

## 2024-01-01 ASSESSMENT — COLUMBIA-SUICIDE SEVERITY RATING SCALE - C-SSRS
6. HAVE YOU EVER DONE ANYTHING, STARTED TO DO ANYTHING, OR PREPARED TO DO ANYTHING TO END YOUR LIFE?: NO
2. HAVE YOU ACTUALLY HAD ANY THOUGHTS OF KILLING YOURSELF IN THE PAST MONTH?: NO
1. IN THE PAST MONTH, HAVE YOU WISHED YOU WERE DEAD OR WISHED YOU COULD GO TO SLEEP AND NOT WAKE UP?: NO
6. HAVE YOU EVER DONE ANYTHING, STARTED TO DO ANYTHING, OR PREPARED TO DO ANYTHING TO END YOUR LIFE?: NO
1. IN THE PAST MONTH, HAVE YOU WISHED YOU WERE DEAD OR WISHED YOU COULD GO TO SLEEP AND NOT WAKE UP?: NO
1. IN THE PAST MONTH, HAVE YOU WISHED YOU WERE DEAD OR WISHED YOU COULD GO TO SLEEP AND NOT WAKE UP?: NO
6. HAVE YOU EVER DONE ANYTHING, STARTED TO DO ANYTHING, OR PREPARED TO DO ANYTHING TO END YOUR LIFE?: NO

## 2024-01-01 ASSESSMENT — PAIN SCALES - GENERAL
PAINLEVEL: EXTREME PAIN (8)
PAINLEVEL: SEVERE PAIN (7)
PAINLEVEL: SEVERE PAIN (7)

## 2024-01-01 ASSESSMENT — ENCOUNTER SYMPTOMS
SEIZURES: 0
DYSRHYTHMIAS: 0

## 2024-01-01 ASSESSMENT — LIFESTYLE VARIABLES: TOBACCO_USE: 0

## 2024-01-04 NOTE — PROGRESS NOTES
Infusion Nursing Note:  Quan Murphy presents today for PICC Labs, dressing change.    Patient seen by provider today: No   present during visit today: Not Applicable.    Note: Patient arrived with wife. VS taken. No complaints. .      Intravenous Access:  PICC.  NS flushes with a little resistance, no blood return. Dressing and cap changed still no blood return.   Contacted PCP-Alteplase ordered. No blood return at 30 minutes, rechecked at 50 minutes with positive blood return. 10 ml blood removed for waste then labs drawn.    Treatment Conditions:  Lab Results   Component Value Date    HGB 9.3 (L) 01/04/2024    WBC 5.0 01/04/2024    ANEU 5.5 07/02/2021    ANEUTAUTO 3.3 01/04/2024     01/04/2024        Lab Results   Component Value Date     11/28/2023    POTASSIUM 4.6 11/28/2023    MAG 1.6 (L) 11/07/2023    CR 1.16 01/04/2024    LATRELL 8.9 11/28/2023    BILITOTAL 0.3 11/28/2023    ALBUMIN 2.8 (L) 11/28/2023    ALT 36 01/04/2024    AST 52 (H) 11/28/2023         Post Infusion Assessment:  PICC dressing change completed.   Site patent and intact, free from redness, edema or discomfort.       Discharge Plan:   Patient discharged in stable condition accompanied by: self and wife.  Departure Mode: Ambulatory.  Return in 1 week. .      Mirna Hernandez RN

## 2024-01-04 NOTE — PROGRESS NOTES
[address] [address]  Phone: [phone]  Fax: [phone]  Primary Provider: Monico Rivers  Pre-op Performing Provider: MONICO RIVERS      PREOPERATIVE EVALUATION:  Today's date: 1/4/2024    New is a 73 year old, presenting for the following:  Pre-Op Exam        1/4/2024    10:59 AM   Additional Questions   Roomed by Vicky COLLINS   Accompanied by Alessandra, wife         1/4/2024    10:59 AM   Patient Reported Additional Medications   Patient reports taking the following new medications vancomycin,       Surgical Information:  Surgery/Procedure: ENDOSCOPIC RETROGRADE CHOLANGIOPANCREATOGRAPHY, WITH TUBE, STENT, OR FOREIGN BODY REMOVAL   Surgery Location: [facility]  Surgeon: Dr. Blood  Surgery Date: 1/15/2024  Time of Surgery: 2:30 pm  Where patient plans to recover: At home with family  Fax number for surgical facility: Note does not need to be faxed, will be available electronically in Epic.    Assessment & Plan     The proposed surgical procedure is considered INTERMEDIATE risk.    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)       Digestive    Metastatic cancer to liver (H)       Endocrine    Hypomagnesemia    Hypothyroidism due to acquired atrophy of thyroid    Hypoalbuminemia       Circulatory    Hypertension goal BP (blood pressure) < 140/90       Musculoskeletal and Integumentary    Malignant neoplasm metastatic to bone (H)       Immune    Pancytopenia (H)       Urinary    Chronic kidney disease, stage 3 (H)       Hematologic    Anemia, unspecified type       Other    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, insomnia type    Uncomplicated opioid dependence (H)    S/P ERCP, sphincterotomy with stent placement, lap cholecystectomy 10/16-10/18/23     Other Visit Diagnoses       Preop general physical exam    -  Primary    Liver abscess        Severe  protein-calorie malnutrition (H24)              Patient feeling improved and his abdominal pain as well as nausea.  Intake has been good the last several weeks.  No fevers or evidence of continued infection on antibiotics.  Did have recent labs, echo and EKG done.  Negative stress testing last year.  Plan to continue for stent removal now.  Anemia has been stable.  Will be starting chemo after removal.  Again discussion regarding his Ambien and chronic pain medication today.  Will continue oxycodone at current limitations.  Again risk of respiratory suppression and death overdose and addiction discussed.  He does have pain contract.  Okay for surgery       Risks and Recommendations:  The patient has the following additional risks and recommendations for perioperative complications:  Anemia/Bleeding/Clotting:    - Anemia and does not require treatment prior to surgery. Monitor hemoglobin postoperatively  Infection:    - Patient with ongoing treatment for liver abscess    Antiplatelet or Anticoagulation Medication Instructions:   - aspirin: Discontinue aspirin 7-10 days prior to procedure to reduce bleeding risk. It should be resumed postoperatively.     Additional Medication Instructions:  Hold pregabalin on day of surgery    RECOMMENDATION:  APPROVAL GIVEN to proceed with proposed procedure, without further diagnostic evaluation.      Subjective       HPI related to upcoming procedure: ENDOSCOPIC RETROGRADE CHOLANGIOPANCREATOGRAPHY, WITH TUBE, STENT, OR FOREIGN BODY REMOVAL         12/28/2023     1:59 PM   Preop Questions   1. Have you ever had a heart attack or stroke? No   2. Have you ever had surgery on your heart or blood vessels, such as a stent placement, a coronary artery bypass, or surgery on an artery in your head, neck, heart, or legs? YES - stent   3. Do you have chest pain with activity? No   4. Do you have a history of  heart failure? No   5. Do you currently have a cold, bronchitis or symptoms of other  infection? YES - infection in abscess on right side   6. Do you have a cough, shortness of breath, or wheezing? No   7. Do you or anyone in your family have previous history of blood clots? No   8. Do you or does anyone in your family have a serious bleeding problem such as prolonged bleeding following surgeries or cuts? No   9. Have you ever had problems with anemia or been told to take iron pills? YES - anemia   10. Have you had any abnormal blood loss such as black, tarry or bloody stools? No   11. Have you ever had a blood transfusion? YES - surgery   11a. Have you ever had a transfusion reaction? No   12. Are you willing to have a blood transfusion if it is medically needed before, during, or after your surgery? Yes   13. Have you or any of your relatives ever had problems with anesthesia? No   14. Do you have sleep apnea, excessive snoring or daytime drowsiness? No   15. Do you have any artifical heart valves or other implanted medical devices like a pacemaker, defibrillator, or continuous glucose monitor? No   16. Do you have artificial joints? YES - left knee   17. Are you allergic to latex? No       Health Care Directive:  Patient has a Health Care Directive on file      Preoperative Review of :   reviewed - controlled substances reflected in medication list.      Status of Chronic Conditions:  ANEMIA - Patient has a recent history of moderate-severe anemia, which has not been symptomatic.     DEPRESSION - Patient has a long history of Depression of moderate severity requiring medication for control with recent symptoms being stable..Current symptoms of depression include depressed mood, insomnia.     HYPOTHYROIDISM - Patient has a longstanding history of chronic Hypothyroidism. Patient has been doing well, noting no tremor, insomnia, hair loss or changes in skin texture. Continues to take medications as directed, without adverse reactions or side effects. Last TSH   Lab Results   Component Value Date     TSH 0.03 (L) 01/02/2024   .      Review of Systems  CONSTITUTIONAL: NEGATIVE for fever, chills, change in weight  INTEGUMENTARY/SKIN: NEGATIVE for worrisome rashes, moles or lesions  EYES: NEGATIVE for vision changes or irritation  ENT/MOUTH: NEGATIVE for ear, mouth and throat problems  RESP: NEGATIVE for significant cough or SOB  CV: NEGATIVE for chest pain, palpitations or peripheral edema  GI: NEGATIVE for nausea, abdominal pain, heartburn, or change in bowel habits  : NEGATIVE for frequency, dysuria, or hematuria  MUSCULOSKELETAL: NEGATIVE for significant arthralgias or myalgia  NEURO: NEGATIVE for weakness, dizziness or paresthesias  ENDOCRINE: NEGATIVE for temperature intolerance, skin/hair changes  HEME: NEGATIVE for bleeding problems  PSYCHIATRIC: NEGATIVE for changes in mood or affect    Patient Active Problem List    Diagnosis Date Noted    Nausea with vomiting 11/03/2023     Priority: Medium    Metabolic acidosis, normal anion gap (NAG) 10/31/2023     Priority: Medium    Oropharyngeal dysphagia 10/31/2023     Priority: Medium    Elevated alkaline phosphatase level 10/31/2023     Priority: Medium    Bile salt-induced diarrhea 10/31/2023     Priority: Medium    Hypophosphatemia 10/30/2023     Priority: Medium    Hypoalbuminemia 10/30/2023     Priority: Medium    Anemia, unspecified type 10/30/2023     Priority: Medium    Elevated lactic acid level 10/30/2023     Priority: Medium    Abnormal chest CT-RLL opacity 10/30/2023     Priority: Medium    Abnormal abdominal CT scan-new extrahepatic 2 cm low attenuation area with fat stranding 10/30/2023     Priority: Medium    Inadequate oral nutritional intake 10/29/2023     Priority: Medium    Metastatic cancer to liver (H) 10/29/2023     Priority: Medium    History of bacteremia-Enterococcus Sept 2023 10/29/2023     Priority: Medium    S/P ERCP, sphincterotomy with stent placement, lap cholecystectomy 10/16-10/18/23 10/29/2023     Priority: Medium     Hypokalemia 10/28/2023     Priority: Medium    Gallstone pancreatitis 10/15/2023     Priority: Medium    Acute cholecystitis 10/07/2023     Priority: Medium    Hypothyroidism due to acquired atrophy of thyroid 03/09/2023     Priority: Medium    Malignant neoplasm metastatic to bone (H) 01/17/2023     Priority: Medium    Pancytopenia (H) 08/16/2022     Priority: Medium    Uncomplicated opioid dependence (H)      Priority: Medium    Chronic kidney disease, stage 3 (H) 06/15/2021     Priority: Medium    Failure to thrive (0-17) 06/02/2021     Priority: Medium     Replacing diagnoses that were inactivated after the 10/1/2021 regulatory import.      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     Priority: Medium    Hypomagnesemia 04/13/2021     Priority: Medium    Hiatal hernia 02/17/2020     Priority: Medium    Renal hematoma 10/28/2017     Priority: Medium    Retroperitoneal hematoma 10/28/2017     Priority: Medium    Syncope 10/18/2017     Priority: Medium    S/P ORIF (open reduction internal fixation) fracture 08/03/2017     Priority: Medium    Closed Colles' fracture of right radius with routine healing, subsequent encounter 08/03/2017     Priority: Medium    Status post insertion of drug-eluting stent into left anterior descending artery for coronary artery disease 01/05/2017     Priority: Medium    Chest pain 12/24/2016     Priority: Medium    Arthropathy 02/04/2014     Priority: Medium     Problem list name updated by automated process. Provider to review      Coronary atherosclerosis      Priority: Medium     Coronary artery disease  Problem list name updated by automated process. Provider to review      Hallux rigidus 07/16/2013     Priority: Medium    Inguinal hernia 06/28/2013     Priority: Medium    Degenerative arthritis of foot 06/28/2013     Priority: Medium    Hypertension goal BP (blood pressure) < 140/90 06/12/2012     Priority: Medium    Hyperlipidemia LDL goal <130 12/22/2011     Priority:  Medium    Anxiety 11/02/2011     Priority: Medium    Allergic conjunctivitis 07/08/2008     Priority: Medium    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, insomnia type 11/17/2006     Priority: Medium     Problem list name updated by automated process. Provider to review      Acute reaction to stress 01/12/2005     Priority: Medium     Problem list name updated by automated process. Provider to review      Chronic maxillary sinusitis 01/12/2005     Priority: Medium    Contracture of palmar fascia 01/12/2005     Priority: Medium    Benign neoplasm of skin of other and unspecified parts of face 01/12/2005     Priority: Medium    Malignant neoplasm of prostate (H) 11/26/2004     Priority: Medium      Past Medical History:   Diagnosis Date    Allergic rhinitis     Allergy, unspecified not elsewhere classified     Seasonal allergies, pollen, dust, smoke and animals    Antiplatelet or antithrombotic long-term use     Anxiety     Arthritis     Chest pain     Chronic sinusitis     Coronary atherosclerosis of unspecified type of vessel, native or graft     Coronary artery disease    Depressive disorder 1995    Gastroesophageal reflux disease 2020    Cleared with medication    Head injury 1954    Hiatal hernia 2015    Right Side    History of blood transfusion 12/15/2004    Prostate Surgery - My own blood    Hyperlipidemia     Hypertension     Inguinal hernia     Kidney problem 10/08/2017    Lithotripsy    Kidney stones     Malignant neoplasm of prostate (H)     Prostate cancer    Nausea with vomiting 11/03/2023    Prostate cancer (H)     Syncope     Tonsil cancer (H)      Past Surgical History:   Procedure Laterality Date    ABDOMEN SURGERY      ARTHRODESIS FOOT  07/23/2013    Procedure: ARTHRODESIS FOOT;  Great Toe Arthrodesis Left Foot;  Surgeon: Ash Gonzalez DPM;  Location: [facility]    ARTHRODESIS FOOT  06/10/2014    Procedure: ARTHRODESIS FOOT;  Surgeon: Ash Gonzalez DPM;  Location: [facility]    BIOPSY   10/01/2004    dermatologist biopsies    COLONOSCOPY  10/07/2013    Procedure: COLONOSCOPY;  Colonoscopy;  Surgeon: Mike Fallon MD;  Location:  GI    CYSTOSCOPY N/A 02/16/2022    Procedure: CYSTOSCOPY and bladder stone removal;  Surgeon: David Rogers MD;  Location:  OR    ENDOSCOPIC RETROGRADE CHOLANGIOPANCREATOGRAM COMPLEX N/A 10/16/2023    Procedure: ENDOSCOPIC RETROGRADE CHOLANGIOPANCREATOGRAPHY, COMPLEX;  Surgeon: Trent Blood MD;  Location:  OR    ENDOSCOPIC ULTRASOUND UPPER GASTROINTESTINAL TRACT (GI) N/A 10/16/2023    Procedure: Endoscopic ultrasound upper gastrointestinal tract (GI);  Surgeon: Trent Blood MD;  Location:  OR    ENT SURGERY      ESOPHAGOSCOPY, GASTROSCOPY, DUODENOSCOPY (EGD), COMBINED N/A 02/12/2020    Procedure: ESOPHAGOGASTRODUODENOSCOPY (EGD);  Surgeon: Sam Escobar MD;  Location:  GI    EXTRACORPOREAL SHOCK WAVE LITHOTRIPSY (ESWL) Bilateral 10/18/2017    Procedure: EXTRACORPOREAL SHOCK WAVE LITHOTRIPSY (ESWL);  BILATERAL EXTRACORPOREAL SHOCKWAVE LITHOTRIPSY ;  Surgeon: Meir Torres MD;  Location:  OR    HC CORRECT BUNION,SIMPLE  08/11/2005    x3    HC REMV TOE BENIGN BONE LESN  08/11/2005    HEAD & NECK SURGERY  1954    From a fall    HERNIORRHAPHY INGUINAL  07/03/2013    Procedure: HERNIORRHAPHY INGUINAL;  Open Repair Inguinal hernia Right with mesh ;  Surgeon: Sam Escobar MD;  Location:  OR    IR GASTROSTOMY TUBE PERCUTANEOUS PLCMNT  06/07/2021    LAPAROSCOPIC CHOLECYSTECTOMY N/A 10/18/2023    Procedure: CHOLECYSTECTOMY, LAPAROSCOPIC;  Surgeon: Dannie Warner MD;  Location:  OR    MOHS MICROGRAPHIC PROCEDURE  08/23/2011    ear and chin-[facility]    OPEN REDUCTION INTERNAL FIXATION WRIST Right 07/18/2017    Procedure: OPEN REDUCTION INTERNAL FIXATION WRIST;  Right distal radius open reduction and internal fixation;  Surgeon: Pedro Blanca DO;  Location:  OR    RECONSTRUCT FOREFOOT WITH  METATARSOPHALANGEAL (MTP) FUSION  06/10/2014    Procedure: RECONSTRUCT FOREFOOT WITH METATARSOPHALANGEAL (MTP) FUSION;  Surgeon: Ash Gonzalez DPM;  Location: [facility] OR    STENT, CORONARY, DEMI      SURGICAL HISTORY OF -   1999/1974    lt knee    SURGICAL HISTORY OF -   10/2004    lithotripsy    SURGICAL HISTORY OF -   11/2005    angiogram with stent    VASCULAR SURGERY  11/17/2005    Puncture of iliac artery during and angiogram    [facility] LAPAROSCOPY, SURGICAL PROSTATECTOMY, RETROPUBIC RADICAL, W/NERVE SPARING  11/30/2004    With full bilateral pelvic lymphadenectomy.  F-[facility].    [facility] TOTAL KNEE ARTHROPLASTY  05/01/2008    Left knee     Current Outpatient Medications   Medication Sig Dispense Refill    acetaminophen (TYLENOL) 325 MG tablet Take 1-2 tablets (325-650 mg) by mouth every 6 hours as needed for mild pain 40 tablet 0    aspirin 81 MG EC tablet Take 81 mg by mouth daily      cefTRIAXone Sodium (ROCEPHIN IJ) Inject 2 g as directed every 24 hours Supplied per [facility]   Taking vancomycin IV twice daily      cetirizine (ZYRTEC) 10 MG tablet Take 1 tablet (10 mg) by mouth daily 30 tablet 1    citalopram (CELEXA) 40 MG tablet Take 1 tablet (40 mg) by mouth daily 90 tablet 3    DULoxetine (CYMBALTA) 60 MG capsule Take 1 capsule (60 mg) by mouth daily 90 capsule 3    famotidine (PEPCID) 20 MG tablet Take 20 mg by mouth 2 times daily as needed (heartburn)      metoprolol succinate ER (TOPROL XL) 50 MG 24 hr tablet Take 0.5 tablets (25 mg) by mouth daily 90 tablet 3    ondansetron (ZOFRAN ODT) 4 MG ODT tab DISSOLVE ONE TABLET BY MOUTH EVERY 8 HOURS AS NEEDED FOR NAUSEA 10 tablet 0    ondansetron (ZOFRAN) 4 MG tablet Take 1 tablet (4 mg) by mouth every 8 hours as needed for nausea 21 tablet 0    oxyCODONE (ROXICODONE) 5 MG tablet TAKE ONE TO TWO  TABLETS (5-10 MG) BY MOUTH EVERY 6 HOURS AS NEEDED FOR SEVERE PAIN (LIMIT TO 6 TABLETS PER DAY) 120 tablet 0    pantoprazole (PROTONIX) 40 MG EC tablet Take 40 mg by mouth  "daily      rosuvastatin (CRESTOR) 40 MG tablet Take 1 tablet (40 mg) by mouth daily 90 tablet 3    zolpidem (AMBIEN) 10 MG tablet TAKE 1 TABLET (10 MG) BY MOUTH NIGHTLY AS NEEDED FOR SLEEP 30 tablet 0    levothyroxine (SYNTHROID/LEVOTHROID) 88 MCG tablet Take 1 tablet (88 mcg) by mouth daily (Patient not taking: Reported on 1/4/2024) 90 tablet 1    naloxone (NARCAN) 4 MG/0.1ML nasal spray Spray 1 spray (4 mg) into one nostril alternating nostrils as needed for opioid reversal every 2-3 minutes until assistance arrives (Patient not taking: Reported on 1/4/2024) 0.2 mL 0    nitroGLYcerin (NITROSTAT) 0.4 MG sublingual tablet For chest pain place 1 tablet under the tongue every 5 minutes for 3 doses. If symptoms persist 5 minutes after 1st dose call 911. (Patient not taking: Reported on 12/19/2023) 25 tablet 0    pregabalin (LYRICA) 150 MG capsule Take 1 capsule (150 mg) by mouth 2 times daily for 30 days 60 capsule 0       Allergies   Allergen Reactions    Animal Dander     Azithromycin Nausea and Vomiting    Dust Mites     Pollen Extract     Smoke.         Social History     Tobacco Use    Smoking status: Never    Smokeless tobacco: Never   Substance Use Topics    Alcohol use: Yes     Alcohol/week: 10.0 standard drinks of alcohol     Types: 10 Cans of beer per week     Comment: Moderate - 1 to two beers per day. ( None lately).     Family History   Problem Relation Age of Onset    Hypertension Father         Lived to age 87    Connective Tissue Disorder Mother         LUPUS    Heart Disease Mother         Valve replacement    Anxiety Disorder Mother         Lived to age 84    Dementia Mother         Nursing Home (lived to age 86)     History   Drug Use No         Objective     /68 (BP Location: Right arm, Patient Position: Chair)   Pulse 64   Temp 97.5  F (36.4  C) (Temporal)   Resp 18   Ht 1.715 m (5' 7.5\")   Wt 86.2 kg (190 lb 1.6 oz)   SpO2 99%   BMI 29.33 kg/m      Physical Exam    GENERAL APPEARANCE: " alert and no distress     EYES: EOMI,  PERRL     HENT: ear canals and TM's normal and nose and mouth without ulcers or lesions     NECK: no adenopathy, scar     RESP: lungs clear to auscultation - no rales, rhonchi or wheezes     CV: regular rates and rhythm, normal S1 S2, no S3 or S4 and no murmur, click or rub     ABDOMEN:  soft, nontender, no HSM or masses and bowel sounds normal     MS: extremities normal- no gross deformities noted, no evidence of inflammation in joints, FROM in all extremities.     SKIN: no suspicious lesions or rashes     NEURO: Normal strength and tone, sensory exam grossly normal, mentation intact and speech normal     PSYCH: mentation appears normal. and affect normal/bright    Recent Labs   Lab Test 01/04/24  1037 12/28/23  1231 12/21/23  0919 11/28/23  1517 11/07/23  1454 10/15/23  1228 10/15/23  0504 10/07/23  2056 10/07/23  0953   HGB 9.3* 9.3*   < >  --  10.5*   < > 11.6*   < > 12.6*    246   < >  --  318   < > 222   < > 298   INR  --   --   --   --   --   --  1.14  --  1.28*   NA  --   --   --  137 137   < > 140   < > 137   POTASSIUM  --   --   --  4.6 4.7   < > 1.6*   < > 2.2*   CR 1.16 0.88   < > 0.83 1.09   < > 1.74*   < > 2.50*    < > = values in this interval not displayed.        Diagnostics:  No labs were ordered during this visit.   No EKG this visit, completed in the last 90 days.    Revised Cardiac Risk Index (RCRI):  The patient has the following serious cardiovascular risks for perioperative complications:   - Coronary Artery Disease (MI, positive stress test, angina, Qs on EKG) = 1 point     RCRI Interpretation: 1 point: Class II (low risk - 0.9% complication rate)         Signed Electronically by: Monico Rievrs MD  Copy of this evaluation report is provided to requesting physician.

## 2024-01-11 NOTE — ED TRIAGE NOTES
PT here with problems with the piccline, states that around 12 midnight he was out walkig his dog,and he got wrapped up with the leash and fell on the left shoulder and left rib, bruising noted to left shoulder, PS 8/10. PT with a single lumen PICCLINE to left arm.

## 2024-01-11 NOTE — ED PROVIDER NOTES
History     Chief Complaint   Patient presents with    Shoulder Pain    Rib Pain     HPI  Quan Murphy is a 73 year old male who presents with left shoulder and rib pain after a fall.  Patient got tangled up with his dog and ended up falling on an incline on his driveway onto his left shoulder and rib area.  Patient has a PICC line has been in for antibiotic infusion.  Since then he has noticed a lot of bruising over his shoulder and he could feel what felt like coiling of the PICC line.  His wife tried to flush it tonight but it was unable to be flushed.  Patient has normal range of motion of his arm.  Denies any extremity numbness or tingling.    Allergies:  Allergies   Allergen Reactions    Animal Dander     Augmentin [Amoxicillin-Pot Clavulanate] GI Disturbance    Azithromycin Nausea and Vomiting    Dust Mites     Pollen Extract     Smoke.        Problem List:    Patient Active Problem List    Diagnosis Date Noted    Nausea with vomiting 11/03/2023     Priority: Medium    Metabolic acidosis, normal anion gap (NAG) 10/31/2023     Priority: Medium    Oropharyngeal dysphagia 10/31/2023     Priority: Medium    Elevated alkaline phosphatase level 10/31/2023     Priority: Medium    Bile salt-induced diarrhea 10/31/2023     Priority: Medium    Hypophosphatemia 10/30/2023     Priority: Medium    Hypoalbuminemia 10/30/2023     Priority: Medium    Anemia, unspecified type 10/30/2023     Priority: Medium    Elevated lactic acid level 10/30/2023     Priority: Medium    Abnormal chest CT-RLL opacity 10/30/2023     Priority: Medium    Abnormal abdominal CT scan-new extrahepatic 2 cm low attenuation area with fat stranding 10/30/2023     Priority: Medium    Inadequate oral nutritional intake 10/29/2023     Priority: Medium    Metastatic cancer to liver (H) 10/29/2023     Priority: Medium    History of bacteremia-Enterococcus Sept 2023 10/29/2023     Priority: Medium    S/P ERCP, sphincterotomy with stent placement, lap  cholecystectomy 10/16-10/18/23 10/29/2023     Priority: Medium    Hypokalemia 10/28/2023     Priority: Medium    Gallstone pancreatitis 10/15/2023     Priority: Medium    Acute cholecystitis 10/07/2023     Priority: Medium    Hypothyroidism due to acquired atrophy of thyroid 03/09/2023     Priority: Medium    Malignant neoplasm metastatic to bone (H) 01/17/2023     Priority: Medium    Pancytopenia (H) 08/16/2022     Priority: Medium    Uncomplicated opioid dependence (H)      Priority: Medium    Chronic kidney disease, stage 3 (H) 06/15/2021     Priority: Medium    Failure to thrive (0-17) 06/02/2021     Priority: Medium     Replacing diagnoses that were inactivated after the 10/1/2021 regulatory import.      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     Priority: Medium    Hypomagnesemia 04/13/2021     Priority: Medium    Hiatal hernia 02/17/2020     Priority: Medium    Renal hematoma 10/28/2017     Priority: Medium    Retroperitoneal hematoma 10/28/2017     Priority: Medium    Syncope 10/18/2017     Priority: Medium    S/P ORIF (open reduction internal fixation) fracture 08/03/2017     Priority: Medium    Closed Colles' fracture of right radius with routine healing, subsequent encounter 08/03/2017     Priority: Medium    Status post insertion of drug-eluting stent into left anterior descending artery for coronary artery disease 01/05/2017     Priority: Medium    Chest pain 12/24/2016     Priority: Medium    Arthropathy 02/04/2014     Priority: Medium     Problem list name updated by automated process. Provider to review      Coronary atherosclerosis      Priority: Medium     Coronary artery disease  Problem list name updated by automated process. Provider to review      Hallux rigidus 07/16/2013     Priority: Medium    Inguinal hernia 06/28/2013     Priority: Medium    Degenerative arthritis of foot 06/28/2013     Priority: Medium    Hypertension goal BP (blood pressure) < 140/90 06/12/2012      Priority: Medium    Hyperlipidemia LDL goal <130 12/22/2011     Priority: Medium    Anxiety 11/02/2011     Priority: Medium    Allergic conjunctivitis 07/08/2008     Priority: Medium    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, insomnia type 11/17/2006     Priority: Medium     Problem list name updated by automated process. Provider to review      Acute reaction to stress 01/12/2005     Priority: Medium     Problem list name updated by automated process. Provider to review      Chronic maxillary sinusitis 01/12/2005     Priority: Medium    Contracture of palmar fascia 01/12/2005     Priority: Medium    Benign neoplasm of skin of other and unspecified parts of face 01/12/2005     Priority: Medium    Malignant neoplasm of prostate (H) 11/26/2004     Priority: Medium        Past Medical History:    Past Medical History:   Diagnosis Date    Allergic rhinitis     Allergy, unspecified not elsewhere classified     Antiplatelet or antithrombotic long-term use     Anxiety     Arthritis     Chest pain     Chronic sinusitis     Coronary atherosclerosis of unspecified type of vessel, native or graft     Depressive disorder 1995    Gastroesophageal reflux disease 2020    Head injury 1954    Hiatal hernia 2015    History of blood transfusion 12/15/2004    Hyperlipidemia     Hypertension     Inguinal hernia     Kidney problem 10/08/2017    Kidney stones     Malignant neoplasm of prostate (H)     Nausea with vomiting 11/03/2023    Prostate cancer (H)     Syncope     Tonsil cancer (H)        Past Surgical History:    Past Surgical History:   Procedure Laterality Date    ABDOMEN SURGERY      ARTHRODESIS FOOT  07/23/2013    Procedure: ARTHRODESIS FOOT;  Great Toe Arthrodesis Left Foot;  Surgeon: Ash Gonzalez DPM;  Location: [facility]    ARTHRODESIS FOOT  06/10/2014    Procedure: ARTHRODESIS FOOT;  Surgeon: Ash Gonzalez DPM;  Location: [facility]    BIOPSY  10/01/2004    dermatologist biopsies    COLONOSCOPY  10/07/2013     Procedure: COLONOSCOPY;  Colonoscopy;  Surgeon: Mike Fallon MD;  Location:  GI    CYSTOSCOPY N/A 02/16/2022    Procedure: CYSTOSCOPY and bladder stone removal;  Surgeon: David Rogers MD;  Location:  OR    ENDOSCOPIC RETROGRADE CHOLANGIOPANCREATOGRAM COMPLEX N/A 10/16/2023    Procedure: ENDOSCOPIC RETROGRADE CHOLANGIOPANCREATOGRAPHY, COMPLEX;  Surgeon: Trent Blood MD;  Location:  OR    ENDOSCOPIC ULTRASOUND UPPER GASTROINTESTINAL TRACT (GI) N/A 10/16/2023    Procedure: Endoscopic ultrasound upper gastrointestinal tract (GI);  Surgeon: Trent Blood MD;  Location:  OR    ENT SURGERY      ESOPHAGOSCOPY, GASTROSCOPY, DUODENOSCOPY (EGD), COMBINED N/A 02/12/2020    Procedure: ESOPHAGOGASTRODUODENOSCOPY (EGD);  Surgeon: Sam Escobar MD;  Location: [facility]    EXTRACORPOREAL SHOCK WAVE LITHOTRIPSY (ESWL) Bilateral 10/18/2017    Procedure: EXTRACORPOREAL SHOCK WAVE LITHOTRIPSY (ESWL);  BILATERAL EXTRACORPOREAL SHOCKWAVE LITHOTRIPSY ;  Surgeon: Meir Torres MD;  Location:  OR    HC CORRECT BUNION,SIMPLE  08/11/2005    x3    HC REMV TOE BENIGN BONE LESN  08/11/2005    HEAD & NECK SURGERY  1954    From a fall    HERNIORRHAPHY INGUINAL  07/03/2013    Procedure: HERNIORRHAPHY INGUINAL;  Open Repair Inguinal hernia Right with mesh ;  Surgeon: Sam Escobar MD;  Location:  OR    IR GASTROSTOMY TUBE PERCUTANEOUS PLCMNT  06/07/2021    LAPAROSCOPIC CHOLECYSTECTOMY N/A 10/18/2023    Procedure: CHOLECYSTECTOMY, LAPAROSCOPIC;  Surgeon: Dannie Warner MD;  Location:  OR    MOHS MICROGRAPHIC PROCEDURE  08/23/2011    ear and chin-[facility]    OPEN REDUCTION INTERNAL FIXATION WRIST Right 07/18/2017    Procedure: OPEN REDUCTION INTERNAL FIXATION WRIST;  Right distal radius open reduction and internal fixation;  Surgeon: Pedro Blanca DO;  Location:  OR    RECONSTRUCT FOREFOOT WITH METATARSOPHALANGEAL (MTP) FUSION  06/10/2014    Procedure: RECONSTRUCT  FOREFOOT WITH METATARSOPHALANGEAL (MTP) FUSION;  Surgeon: Ash Gonzalez DPM;  Location: [facility]    STENT, CORONARY, DEMI      SURGICAL HISTORY OF -   1999/1974    lt knee    SURGICAL HISTORY OF -   10/2004    lithotripsy    SURGICAL HISTORY OF -   11/2005    angiogram with stent    VASCULAR SURGERY  11/17/2005    Puncture of iliac artery during and angiogram    [facility] LAPAROSCOPY, SURGICAL PROSTATECTOMY, RETROPUBIC RADICAL, W/NERVE SPARING  11/30/2004    With full bilateral pelvic lymphadenectomy.  [facility].    [facility] TOTAL KNEE ARTHROPLASTY  05/01/2008    Left knee       Family History:    Family History   Problem Relation Age of Onset    Hypertension Father         Lived to age 87    Connective Tissue Disorder Mother         LUPUS    Heart Disease Mother         Valve replacement    Anxiety Disorder Mother         Lived to age 84    Dementia Mother         Nursing Home (lived to age 86)       Social History:  Marital Status:   [2]  Social History     Tobacco Use    Smoking status: Never    Smokeless tobacco: Never   Vaping Use    Vaping Use: Never used   Substance Use Topics    Alcohol use: Yes     Alcohol/week: 10.0 standard drinks of alcohol     Types: 10 Cans of beer per week     Comment: Moderate - 1 to two beers per day. ( None lately).    Drug use: No        Medications:    acetaminophen (TYLENOL) 325 MG tablet  aspirin 81 MG EC tablet  cefTRIAXone Sodium (ROCEPHIN IJ)  cetirizine (ZYRTEC) 10 MG tablet  citalopram (CELEXA) 40 MG tablet  DULoxetine (CYMBALTA) 60 MG capsule  famotidine (PEPCID) 20 MG tablet  levothyroxine (SYNTHROID/LEVOTHROID) 88 MCG tablet  metoprolol succinate ER (TOPROL XL) 50 MG 24 hr tablet  naloxone (NARCAN) 4 MG/0.1ML nasal spray  nitroGLYcerin (NITROSTAT) 0.4 MG sublingual tablet  ondansetron (ZOFRAN ODT) 4 MG ODT tab  ondansetron (ZOFRAN) 4 MG tablet  oxyCODONE (ROXICODONE) 5 MG tablet  pantoprazole (PROTONIX) 40 MG EC tablet  pregabalin (LYRICA) 150 MG capsule  rosuvastatin  "(CRESTOR) 40 MG tablet  zolpidem (AMBIEN) 10 MG tablet          Review of Systems   All other systems reviewed and are negative.      Physical Exam   BP: 116/72  Pulse: 89  Temp: 97.4  F (36.3  C)  Resp: 20  Height: 172.7 cm (5' 8\")  Weight: 86.2 kg (190 lb)  SpO2: 98 %      Physical Exam  Vitals and nursing note reviewed.   Constitutional:       General: He is not in acute distress.     Appearance: Normal appearance. He is not ill-appearing.   Musculoskeletal:         General: Signs of injury present.      Comments: Some mild bruising over the left clavicle and left anterior shoulder area.  Patient has normal range of motion of the shoulder.  Patient has normal pulses in the hand, patient has normal  strength of the hand on the affected side.   Neurological:      Mental Status: He is alert.         ED Course                 Procedures           Results for orders placed or performed during the hospital encounter of 01/11/24 (from the past 24 hour(s))   XR Chest 1 View    Narrative    EXAM: XR CHEST 1 VIEW  LOCATION: [facility]  DATE: 1/11/2024    INDICATION: Trauma, left shoulder pain, concerned about PICC line displacement  COMPARISON: 06/04/2021      Impression    IMPRESSION: Left PICC, the distal aspect of which is coiled in the medial left subclavian vein, tip in the left brachiocephalic vein. Repositioning/replacement recommended.    Lungs clear. No pleural effusion or pneumothorax. Normal heart size and pulmonary vascularity. Coronary stent. Scoliosis.   XR Shoulder Left G/E 3 Views    Narrative    EXAM: LEFT SHOULDER 3 VIEWS  LOCATION: [facility]  DATE/TIME: 1/11/2024 5:31 AM CST    INDICATION: Fall. Left shoulder pain. Concern for PICC line displacement.  COMPARISON: None.      Impression    IMPRESSION:   1. No visualized acute fracture or malalignment of the left shoulder.  2. A left upper extremity peripherally inserted central catheter " is in place. There is abnormal tortuosity of a few centimeters long segment of the catheter in the region of the left subclavian vein where it curls back upon itself. The distal tip of the   catheter projects over the left brachiocephalic vein and is directed towards the superior vena cava.                   *Note: Due to a large number of results and/or encounters for the requested time period, some results have not been displayed. A complete set of results can be found in Results Review.       Medications   oxyCODONE (ROXICODONE) tablet 5 mg (5 mg Oral $Given 1/11/24 6312)     X-ray showed the PICC line was coiled in the left subclavian vein.  As patient stated earlier, they were unable to flush it at home.  Because this was coiled, I did not want this to clot as it was not functionally more and so this was removed.  This came out very easy and measured length is exactly 47 cm which is the document at length that was placed.  Patient tolerated this well.  There is no other fractures noted.  Recommend continue conservative care.  Patient will be discharged at this time.    Assessments & Plan (with Medical Decision Making)  PICC line problem, left shoulder contusion     I have reviewed the nursing notes.    I have reviewed the findings, diagnosis, plan and need for follow up with the patient.      New Prescriptions    No medications on file       Final diagnoses:   Occlusion of peripherally inserted central catheter (PICC) line, initial encounter (H24)   Contusion of left shoulder, initial encounter       1/11/2024   [facility] EMERGENCY DEPT       Travis Ferris MD  01/11/24 [id number]

## 2024-01-11 NOTE — DISCHARGE INSTRUCTIONS
1.  Talk to your [place] doctors about needing a new PICC line.  2.  Please continue to ice your shoulder and rib area to help with swelling and inflammation.

## 2024-01-15 NOTE — ADDENDUM NOTE
Addendum  created 01/15/24 1600 by Charline Ware    Clinical Note Signed, Intraprocedure Meds edited

## 2024-01-15 NOTE — ANESTHESIA POSTPROCEDURE EVALUATION
Patient: Quan Murphy    Procedure: Procedure(s):  ENDOSCOPIC RETROGRADE CHOLANGIOPANCREATOGRAPHY, WITH stent removal       Anesthesia Type:  General    Note:  Disposition: Admission   Postop Pain Control: Uneventful            Sign Out: Well controlled pain   PONV: No   Neuro/Psych: Uneventful            Sign Out: Acceptable/Baseline neuro status   Airway/Respiratory: Uneventful            Sign Out: Acceptable/Baseline resp. status   CV/Hemodynamics: Uneventful            Sign Out: Acceptable CV status; No obvious hypovolemia; No obvious fluid overload   Other NRE: NONE   DID A NON-ROUTINE EVENT OCCUR? No           Last vitals:  Vitals Value Taken Time   /73 01/15/24 1515   Temp     Pulse 65 01/15/24 1523   Resp 7 01/15/24 1523   SpO2 100 % 01/15/24 1523   Vitals shown include unfiled device data.    Electronically Signed By: Roseann Garner MD  January 15, 2024  3:24 PM

## 2024-01-15 NOTE — DISCHARGE INSTRUCTIONS
Same Day Surgery Discharge Instructions for  Sedation and General Anesthesia     It's not unusual to feel dizzy, light-headed or faint for up to 24 hours after surgery or while taking pain medication.  If you have these symptoms: sit for a few minutes before standing and have someone assist you when you get up to walk or use the bathroom.    You should rest and relax for the next 24 hours. We recommend you make arrangements to have an adult stay with you for at least 24 hours after your discharge.  Avoid hazardous and strenuous activity.    DO NOT DRIVE any vehicle or operate mechanical equipment for 24 hours following the end of your surgery.  Even though you may feel normal, your reactions may be affected by the medication you have received.    Do not drink alcoholic beverages for 24 hours following surgery.     Slowly progress to your regular diet as you feel able. It's not unusual to feel nauseated and/or vomit after receiving anesthesia.  If you develop these symptoms, drink clear liquids (apple juice, ginger ale, broth, 7-up, etc. ) until you feel better.  If your nausea and vomiting persists for 24 hours, please notify your surgeon.      All narcotic pain medications, along with inactivity and anesthesia, can cause constipation. Drinking plenty of liquids and increasing fiber intake will help.    For any questions of a medical nature, call your surgeon.    Do not make important decisions for 24 hours.    If you had general anesthesia, you may have a sore throat for a couple of days related to the breathing tube used during surgery.  You may use Cepacol lozenges to help with this discomfort.  If it worsens or if you develop a fever, contact your surgeon.     If you feel your pain is not well managed with the pain medications prescribed by your surgeon, please contact your surgeon's office to let them know so they can address your concerns.        **If you have questions or concerns about your procedure, call    Dr. Blood at [phone].**

## 2024-01-15 NOTE — ANESTHESIA CARE TRANSFER NOTE
Patient: Quan Murphy    Procedure: Procedure(s):  ENDOSCOPIC RETROGRADE CHOLANGIOPANCREATOGRAPHY, WITH stent removal       Diagnosis: Obstructed, duodenum [K31.5]  Calculus of bile duct w cholangitis, unsp, w/o obstruction [K80.30]  Acute pancreatitis [K85.90]  Bile duct proliferation [K83.8]  Diagnosis Additional Information: No value filed.    Anesthesia Type:   General     Note:    Oropharynx: oropharynx clear of all foreign objects  Level of Consciousness: awake  Oxygen Supplementation: face mask    Independent Airway: airway patency satisfactory and stable  Dentition: dentition unchanged  Vital Signs Stable: post-procedure vital signs reviewed and stable    Patient transferred to: PACU    Handoff Report: Identifed the Patient, Identified the Reponsible Provider, Reviewed the pertinent medical history, Discussed the surgical course, Reviewed Intra-OP anesthesia mangement and issues during anesthesia, Set expectations for post-procedure period and Allowed opportunity for questions and acknowledgement of understanding      Vitals:  Vitals Value Taken Time   /73 01/15/24 1515   Temp     Pulse 64 01/15/24 1524   Resp 6 01/15/24 1524   SpO2 100 % 01/15/24 1524   Vitals shown include unfiled device data.    Electronically Signed By: Roseann Garner MD  January 15, 2024  3:25 PM

## 2024-01-15 NOTE — ANESTHESIA CARE TRANSFER NOTE
Patient: Quan Murphy    Procedure: Procedure(s):  ENDOSCOPIC RETROGRADE CHOLANGIOPANCREATOGRAPHY, WITH stent removal       Diagnosis: Obstructed, duodenum [K31.5]  Calculus of bile duct w cholangitis, unsp, w/o obstruction [K80.30]  Acute pancreatitis [K85.90]  Bile duct proliferation [K83.8]  Diagnosis Additional Information: No value filed.    Anesthesia Type:   General     Note:    Oropharynx: oropharynx clear of all foreign objects and spontaneously breathing  Level of Consciousness: awake  Oxygen Supplementation: room air    Independent Airway: airway patency satisfactory and stable  Dentition: dentition unchanged  Vital Signs Stable: post-procedure vital signs reviewed and stable  Report to RN Given: handoff report given  Patient transferred to: PACU  Comments: Neuromuscular blockade reversed with sugammadex, spontaneous respirations, adequate tidal volumes, followed commands to voice, oropharynx suctioned with soft flexible catheter, extubated atraumatically, extubated with suction, airway patent after extubation.  Oxygen via room air at room air to PACU. SpO2, NiBP, and EKG monitors and alarms on and functioning, Sarah Hugger warmer connected to patient gown, report on patient's clinical status given to PACU RN, RN questions answered.         Handoff Report: Identifed the Patient, Identified the Reponsible Provider, Reviewed the pertinent medical history, Discussed the surgical course, Reviewed Intra-OP anesthesia mangement and issues during anesthesia, Set expectations for post-procedure period and Allowed opportunity for questions and acknowledgement of understanding      Vitals:  Vitals Value Taken Time   /79 01/15/24 1545   Temp     Pulse 66 01/15/24 1558   Resp 12 01/15/24 1558   SpO2 99 % 01/15/24 1558   Vitals shown include unfiled device data.    Electronically Signed By: Charline Ware  January 15, 2024  3:59 PM

## 2024-01-15 NOTE — ANESTHESIA PREPROCEDURE EVALUATION
Anesthesia Pre-Procedure Evaluation    Patient: Quan Murphy   MRN: [id number] : 1950        Procedure : Procedure(s):  ENDOSCOPIC RETROGRADE CHOLANGIOPANCREATOGRAPHY, WITH TUBE, STENT, OR FOREIGN BODY REMOVAL          Past Medical History:   Diagnosis Date    Allergic rhinitis     Allergy, unspecified not elsewhere classified     Seasonal allergies, pollen, dust, smoke and animals    Antiplatelet or antithrombotic long-term use     Anxiety     Arthritis     Chest pain     Chronic sinusitis     Coronary atherosclerosis of unspecified type of vessel, native or graft     Coronary artery disease    Depressive disorder     Gastroesophageal reflux disease     Cleared with medication    Head injury     Hiatal hernia 2015    Right Side    History of blood transfusion 12/15/2004    Prostate Surgery - My own blood    Hyperlipidemia     Hypertension     Inguinal hernia     Kidney problem 10/08/2017    Lithotripsy    Kidney stones     Malignant neoplasm of prostate (H)     Prostate cancer    Nausea with vomiting 2023    Prostate cancer (H)     Syncope     Tonsil cancer (H)       Past Surgical History:   Procedure Laterality Date    ABDOMEN SURGERY      ARTHRODESIS FOOT  2013    Procedure: ARTHRODESIS FOOT;  Great Toe Arthrodesis Left Foot;  Surgeon: Ash Gonzalez DPM;  Location:  OR    ARTHRODESIS FOOT  06/10/2014    Procedure: ARTHRODESIS FOOT;  Surgeon: Ash Gonzalez DPM;  Location: [facility] OR    BIOPSY  10/01/2004    dermatologist biopsies    COLONOSCOPY  10/07/2013    Procedure: COLONOSCOPY;  Colonoscopy;  Surgeon: Mike Fallon MD;  Location:  GI    CYSTOSCOPY N/A 2022    Procedure: CYSTOSCOPY and bladder stone removal;  Surgeon: David Rogers MD;  Location:  OR    ENDOSCOPIC RETROGRADE CHOLANGIOPANCREATOGRAM COMPLEX N/A 10/16/2023    Procedure: ENDOSCOPIC RETROGRADE CHOLANGIOPANCREATOGRAPHY, COMPLEX;  Surgeon: Trent Blood MD;  Location:  OR     ENDOSCOPIC ULTRASOUND UPPER GASTROINTESTINAL TRACT (GI) N/A 10/16/2023    Procedure: Endoscopic ultrasound upper gastrointestinal tract (GI);  Surgeon: Trent Blood MD;  Location:  OR    ENT SURGERY      ESOPHAGOSCOPY, GASTROSCOPY, DUODENOSCOPY (EGD), COMBINED N/A 02/12/2020    Procedure: ESOPHAGOGASTRODUODENOSCOPY (EGD);  Surgeon: Sam Escobar MD;  Location: [facility] GI    EXTRACORPOREAL SHOCK WAVE LITHOTRIPSY (ESWL) Bilateral 10/18/2017    Procedure: EXTRACORPOREAL SHOCK WAVE LITHOTRIPSY (ESWL);  BILATERAL EXTRACORPOREAL SHOCKWAVE LITHOTRIPSY ;  Surgeon: Meir Torres MD;  Location: [facility] OR    HC CORRECT BUNION,SIMPLE  08/11/2005    x3    HC REMV TOE BENIGN BONE LESN  08/11/2005    HEAD & NECK SURGERY  1954    From a fall    HERNIORRHAPHY INGUINAL  07/03/2013    Procedure: HERNIORRHAPHY INGUINAL;  Open Repair Inguinal hernia Right with mesh ;  Surgeon: Sam Escobar MD;  Location: [facility] OR    IR GASTROSTOMY TUBE PERCUTANEOUS PLCMNT  06/07/2021    LAPAROSCOPIC CHOLECYSTECTOMY N/A 10/18/2023    Procedure: CHOLECYSTECTOMY, LAPAROSCOPIC;  Surgeon: Dannie Warner MD;  Location:  OR    MOHS MICROGRAPHIC PROCEDURE  08/23/2011    ear and chin-[facility]    OPEN REDUCTION INTERNAL FIXATION WRIST Right 07/18/2017    Procedure: OPEN REDUCTION INTERNAL FIXATION WRIST;  Right distal radius open reduction and internal fixation;  Surgeon: Pedro Blanca DO;  Location: [facility]    RECONSTRUCT FOREFOOT WITH METATARSOPHALANGEAL (MTP) FUSION  06/10/2014    Procedure: RECONSTRUCT FOREFOOT WITH METATARSOPHALANGEAL (MTP) FUSION;  Surgeon: Ash Gonzalez DPM;  Location: [facility] OR    STENT, CORONARY, DEMI      SURGICAL HISTORY OF -   1999/1974    lt knee    SURGICAL HISTORY OF -   10/2004    lithotripsy    SURGICAL HISTORY OF -   11/2005    angiogram with stent    VASCULAR SURGERY  11/17/2005    Puncture of iliac artery during and angiogram    ZZC LAPAROSCOPY, SURGICAL PROSTATECTOMY,  RETROPUBIC RADICAL, W/NERVE SPARING  11/30/2004    With full bilateral pelvic lymphadenectomy.  F[facility].    Z TOTAL KNEE ARTHROPLASTY  05/01/2008    Left knee      Allergies   Allergen Reactions    Animal Dander     Augmentin [Amoxicillin-Pot Clavulanate] GI Disturbance    Azithromycin Nausea and Vomiting    Dust Mites     Pollen Extract     Smoke.       Social History     Tobacco Use    Smoking status: Never    Smokeless tobacco: Never   Substance Use Topics    Alcohol use: Yes     Alcohol/week: 10.0 standard drinks of alcohol     Types: 10 Cans of beer per week     Comment: Moderate - 1 to two beers per day. ( None lately).      Wt Readings from Last 1 Encounters:   01/11/24 86.2 kg (190 lb)        Prior to Admission medications    Medication Sig Start Date End Date Taking? Authorizing Provider   acetaminophen (TYLENOL) 325 MG tablet Take 1-2 tablets (325-650 mg) by mouth every 6 hours as needed for mild pain 10/19/23   Edwin Langston MD   aspirin 81 MG EC tablet Take 81 mg by mouth daily    Reported, Patient   cefTRIAXone Sodium (ROCEPHIN IJ) Inject 2 g as directed every 24 hours Supplied per [facility]   Taking vancomycin IV twice daily    Reported, Patient   cetirizine (ZYRTEC) 10 MG tablet Take 1 tablet (10 mg) by mouth daily 6/20/23   Monico Rivers MD   citalopram (CELEXA) 40 MG tablet Take 1 tablet (40 mg) by mouth daily 11/28/23   Monico Rivers MD   DULoxetine (CYMBALTA) 60 MG capsule Take 1 capsule (60 mg) by mouth daily 3/23/23   Monico Rivers MD   famotidine (PEPCID) 20 MG tablet Take 20 mg by mouth 2 times daily as needed (heartburn)    Reported, Patient   levothyroxine (SYNTHROID/LEVOTHROID) 88 MCG tablet Take 1 tablet (88 mcg) by mouth daily  Patient not taking: Reported on 1/4/2024 1/3/24   Monico Rivers MD   metoprolol succinate ER (TOPROL XL) 50 MG 24 hr tablet Take 0.5 tablets (25 mg) by mouth daily 7/19/23   Monico Rivers MD   naloxone (NARCAN) 4 MG/0.1ML  nasal spray Spray 1 spray (4 mg) into one nostril alternating nostrils as needed for opioid reversal every 2-3 minutes until assistance arrives  Patient not taking: Reported on 1/4/2024 3/9/23   Monico Rivers MD   nitroGLYcerin (NITROSTAT) 0.4 MG sublingual tablet For chest pain place 1 tablet under the tongue every 5 minutes for 3 doses. If symptoms persist 5 minutes after 1st dose call 911.  Patient not taking: Reported on 12/19/2023 10/2/20   Jose Hernandez MD   ondansetron (ZOFRAN ODT) 4 MG ODT tab DISSOLVE ONE TABLET BY MOUTH EVERY 8 HOURS AS NEEDED FOR NAUSEA 12/18/23   Monico Rivers MD   ondansetron (ZOFRAN) 4 MG tablet Take 1 tablet (4 mg) by mouth every 8 hours as needed for nausea 10/19/23   Edwin Langston MD   oxyCODONE (ROXICODONE) 5 MG tablet TAKE ONE TO TWO  TABLETS (5-10 MG) BY MOUTH EVERY 6 HOURS AS NEEDED FOR SEVERE PAIN (LIMIT TO 6 TABLETS PER DAY) 12/28/23   Monico Rivers MD   pantoprazole (PROTONIX) 40 MG EC tablet Take 40 mg by mouth daily    Reported, Patient   pregabalin (LYRICA) 150 MG capsule Take 1 capsule (150 mg) by mouth 2 times daily for 30 days 11/28/23 12/28/23  Monico Rivers MD   rosuvastatin (CRESTOR) 40 MG tablet Take 1 tablet (40 mg) by mouth daily 11/28/23   Monico Rivers MD   zolpidem (AMBIEN) 10 MG tablet TAKE 1 TABLET (10 MG) BY MOUTH NIGHTLY AS NEEDED FOR SLEEP 12/28/23   Monico Rivers MD       ECG 10/28/23: Sinus Rhythm   -Diffuse nonspecific T-abnormality.    ECHO 10/2023: cardiac echo  Interpretation Summary     1. The left ventricle is normal in structure, function and size. The visual  ejection fraction is estimated at 60%. Global peak LV longitudinal strain is  averaged at -19%. This is within reported normal limits (normal <-18%).  2. The right ventricle is normal in structure, function and size.  3. No valve disease.     Echo 10/2022 showed EF 55%, strain  -19%.  ______________________________________________________________________________  Left Ventricle  The left ventricle is normal in structure, function and size. There is normal  left ventricular wall thickness. The visual ejection fraction is estimated at  60%. Global peak LV longitudinal strain is averaged at -19%. This is within  reported normal limits (normal <-18%). Left ventricular diastolic function is  normal. Normal left ventricular wall motion.     Right Ventricle  The right ventricle is normal in structure, function and size.     Atria  Normal left atrial size. Right atrial size is normal. There is no atrial shunt  seen.     Mitral Valve  There is mild (1+) mitral regurgitation.     Tricuspid Valve  There is mild (1+) tricuspid regurgitation. The right ventricular systolic  pressure is approximated at 24.9 mmHg plus the right atrial pressure.     Aortic Valve  There is mild (1+) aortic regurgitation.     Pulmonic Valve  The pulmonic valve is normal in structure and function.     Vessels  Normal ascending, transverse (arch), and descending aorta. The inferior vena  cava was normal in size with preserved respiratory variability.     Pericardium  There is no pericardial effusion.     Rhythm  Sinus rhythm was noted.    3/2022 stress test  Result Text        The nuclear stress test is probably negative for inducible myocardial ischemia or infarction. (there is a fixed defect in inferoseptal wall with normal wall motion likely diaphragm attenuation     Left ventricular function is normal.     The left ventricular ejection fraction at stress is 61%.     There is no prior study for comparison.    Anesthesia Evaluation   Pt has had prior anesthetic. Type: General.    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                      (-) PAZ, arrhythmias and valvular problems/murmurs   METS/Exercise Tolerance:     Hematologic:     (+)      anemia,       (-) history of blood clots   Musculoskeletal:   (+)  arthritis,             GI/Hepatic: Comment: Choledocholithiasis  Gallstone pancreatitis      (+) GERD,     hiatal hernia,       liver disease,       Renal/Genitourinary: Comment: Severe hypokalemia on admission        (+) renal disease, type: CRI,     Nephrolithiasis ,       Endo:     (+)          thyroid problem, hypothyroidism,        (-) Type I DM, Type II DM and obesity   Psychiatric/Substance Use:     (+) psychiatric history anxiety       Infectious Disease: Comment: Enterococcal bacteremia prior to admission   (-) Recent Fever   Malignancy:   (+) Malignancy, History of Prostate.    Other:               OUTSIDE LABS:  CBC:   Lab Results   Component Value Date    WBC 5.0 01/04/2024    WBC 5.9 12/28/2023    HGB 9.3 (L) 01/04/2024    HGB 9.3 (L) 12/28/2023    HCT 31.1 (L) 01/04/2024    HCT 31.3 (L) 12/28/2023     01/04/2024     12/28/2023     BMP:   Lab Results   Component Value Date     11/28/2023     11/07/2023    POTASSIUM 4.6 11/28/2023    POTASSIUM 4.7 11/07/2023    CHLORIDE 102 11/28/2023    CHLORIDE 109 (H) 11/07/2023    CO2 23 11/28/2023    CO2 18 (L) 11/07/2023    BUN 16.7 11/28/2023    BUN 6.8 (L) 11/07/2023    CR 1.16 01/04/2024    CR 0.88 12/28/2023    GLC 93 11/28/2023    GLC 95 11/07/2023     COAGS:   Lab Results   Component Value Date    PTT 35 10/07/2023    INR 1.14 10/15/2023     POC:   Lab Results   Component Value Date    BGM 85 06/06/2021     HEPATIC:   Lab Results   Component Value Date     "ALBUMIN 2.8 (L) 11/28/2023    PROTTOTAL 6.5 11/28/2023    ALT 36 01/04/2024    AST 52 (H) 11/28/2023    ALKPHOS 232 (H) 01/04/2024    BILITOTAL 0.3 11/28/2023     OTHER:   Lab Results   Component Value Date    PH 7.52 (H) 06/04/2021    LACT 1.2 10/30/2023    LATRELL 8.9 11/28/2023    PHOS 3.4 11/07/2023    MAG 1.6 (L) 11/07/2023    LIPASE 132 (H) 10/28/2023    TSH 0.03 (L) 01/02/2024    T4 1.21 01/02/2024    T3 36 (L) 12/12/2022    CRP 13.8 (H) 12/09/2022    SED 8 02/11/2020       Anesthesia Plan    ASA Status:  3    NPO Status:  NPO Appropriate    Anesthesia Type: General.     - Airway: ETT   Induction: Propofol.   Maintenance: Balanced.        Consents    Anesthesia Plan(s) and associated risks, benefits, and realistic alternatives discussed. Questions answered and patient/representative(s) expressed understanding.     - Discussed:     - Discussed with:  Patient            Postoperative Care    Pain management: Multi-modal analgesia.   PONV prophylaxis: Ondansetron (or other 5HT-3), Dexamethasone or Solumedrol, Background Propofol Infusion     Comments:               Roseann Garner MD    I have reviewed the pertinent notes and labs in the chart from the past 30 days and (re)examined the patient.  Any updates or changes from those notes are reflected in this note.              # Overweight: Estimated body mass index is 28.89 kg/m  as calculated from the following:    Height as of 1/11/24: 1.727 m (5' 8\").    Weight as of 1/11/24: 86.2 kg (190 lb).      "

## 2024-01-15 NOTE — TELEPHONE ENCOUNTER
Patient due for refill tomorrow.    He is heading to [place] for chemotherapy tomorrow around 2pm and would like to  prior.    Bri Oshea XRO/

## 2024-01-19 NOTE — TELEPHONE ENCOUNTER
Forms/Letter Request    Type of form/letter: OTHER: Order request from Mckayla Ponce at [place]   Orders for CBC with differential, ALT, Creatinine, and ECG    Do we have the form/letter: Yes: In mailbox    Who is the form from? Mckayla Ponce at [facility] in [place]    Where did/will the form come from? Patient or family brought in       When is form/letter needed by: N/A    How would you like the form/letter returned: N/A    Patient Notified form requests are processed in 5-7 business days:Yes    Could we send this information to you in [employer] or would you prefer to receive a phone call?:   Patient would like to be contacted via [employer]

## 2024-01-23 NOTE — TELEPHONE ENCOUNTER
CARLOS Block from Infectious disease at [place] calling with update on patient care plan in regards to liver abscess. Patient is allergic to levofloxin and only other option/antibiotic they can use is Linezolid for treatment.     She is wanting to consult with PCP as the Linezolid interacts with patients prescribe Cymbalta and Celexa. Per Mckayla she is wanting to figure out a taper dose to be off these or alternatives that patient can treat his anxiety with as they are hoping to starting the antibiotic asap.    Mckayla can be reached at [phone] and would like the team to call her and the patient when a decision is made to coordinate care.    Aniyah Medeiros, RN, BSN

## 2024-01-24 NOTE — TELEPHONE ENCOUNTER
Huddled with PCP.     PCP asked if he has tried benadryl or hydroxyzine. PCP stated patient should be able to take benadryl with his other meds.     Called patient back with PCP response, and he states he has been taking benadryl and it only helps for maybe an hour.     He is wondering if he can get something stronger, stated he would be willing to try hydroxyzine if PCP will prescribe it.     Mary Jane Browning, BSN, RN

## 2024-01-24 NOTE — TELEPHONE ENCOUNTER
Spoke with Mckayla and infectious disease regarding antibiotic needs.  Will need to be starting antibiotics quickly so plan to stop Cymbalta now and decrease Celexa to 20 mg for the next 3 days and then discontinue to try and avoid some withdrawal side effects.  Anticipate being on for the next few weeks so would not start alternative medication at this time given interaction.  Plan for him to follow-up with me in clinic to discuss things further and let us know if new or worsening mood or anxiety while taking antibiotics.

## 2024-01-24 NOTE — TELEPHONE ENCOUNTER
Huddled with PCP.     PCP states he will send something into the pharmacy for him now.     Called and notified patient of this. He had no further questions or concerns.    Mary Jane Browning, BSN, RN

## 2024-01-24 NOTE — TELEPHONE ENCOUNTER
Mckayla kathleen from [place] to follow up on message that was left yesterday. See note below. She is wanting a call back if possible yet today. She is in clinic until 5 pm. Alina Michael LPN

## 2024-01-24 NOTE — TELEPHONE ENCOUNTER
Patient called wondering if PCP has had time to review the messages left by Mckayla at the [facility]. Writer advised the messages were sent to him and that I see a note from PCP that was just put in a few minutes ago, stating that he talked to Mckayla and gave some instructions to her about the antidepressant medications.     Writer read below note from PCP to patient. Patient states he will wait for Mckayla to call him again regarding the instructions and new antibiotic.     Patient states he is very itchy due to the reaction from the medication. He states he has been using lotion, but says he scratches until he bleeds. He is wondering if there is anything he can take orally to relieve the itching that won't interact with his medications.     Please review and advise.     MELLY SantoyoN, RN

## 2024-01-25 NOTE — PROGRESS NOTES
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)       Endocrine    Hypothyroidism due to acquired atrophy of thyroid       Circulatory    Hypertension goal BP (blood pressure) < 140/90       Musculoskeletal and Integumentary    Malignant neoplasm metastatic to bone (H)       Immune    Pancytopenia (H)       Urinary    Malignant neoplasm of prostate (H)    Chronic kidney disease, stage 3 (H)       Other    Status post insertion of drug-eluting stent into left anterior descending artery for coronary artery disease    Uncomplicated opioid dependence (H)    S/P ERCP, sphincterotomy with stent placement, lap cholecystectomy 10/16-10/18/23     Other Visit Diagnoses       Drug eruption    -  Primary    Liver abscess        Relevant Orders    EKG 12-lead complete w/read - Clinics (Completed)    Severe protein-calorie malnutrition (H24)               EKG today with upcoming nasal menus.  Patient stopped Cymbalta and quickly tapered off Celexa without withdrawal symptoms now.  Thinks mood and anxiety wise he will be fine without them and will not plan for any mother medication at this time.  Can restart once antibiotics are completed if he desires.  Rash improving with itchiness largely gone.  Does have Atarax as needed.  He did see dermatology and not consistent with drug eruption related to his Levaquin.  Has repeat CT today.  Upcoming follow-up with [facility].     BMI  Estimated body mass index is 29.9 kg/m  as calculated from the following:    Height as of this encounter: 1.753 m (5' 9\").    Weight as of this encounter: 91.9 kg (202 lb 8 oz).   Weight management plan: Discussed healthy diet and exercise guidelines        Claribel Wolff is a 73 year old, presenting for the following health issues:  No chief complaint on file.        1/25/2024     2:03 PM   Additional Questions   Roomed by Darlene GOMES MA     History of Present Illness " "      Reason for visit:  Severe rash Probable reaxtion to Levofloxacin started on 1 1224  Symptom onset:  1-3 days ago  Symptoms include:  Rash fatigue low grade temp last evening  Symptom intensity:  Severe  Symptom progression:  Staying the same  Had these symptoms before:  No  What makes it worse:  No  What makes it better:  No    He eats 0-1 servings of fruits and vegetables daily.He consumes 3 sweetened beverage(s) daily.He exercises with enough effort to increase his heart rate 20 to 29 minutes per day.  He exercises with enough effort to increase his heart rate 3 or less days per week.   He is taking medications regularly.     Patient need an EKG prior to being seen at the [facility].  Recent rash that has slightly improved as well as itching is much improved.  Did see dermatology several days ago.  Open follow-up with the [facility] and plan to start linezolid for his liver abscess that did not completely respond with previous antibiotics.  Previous failed attempted drainage.  Patient without chest pain or respiratory symptoms.  Rashes been the most bothersome but happy this is improving.      Review of Systems  Constitutional, neuro, ENT, endocrine, pulmonary, cardiac, gastrointestinal, genitourinary, musculoskeletal, integument and psychiatric systems are negative, except as otherwise noted.      Objective    /62   Pulse 86   Temp 97.7  F (36.5  C) (Temporal)   Resp 18   Ht 1.753 m (5' 9\")   Wt 91.9 kg (202 lb 8 oz)   SpO2 100%   BMI 29.90 kg/m    Body mass index is 29.9 kg/m .  Physical Exam   GENERAL: alert and no distress  EYES: Eyes grossly normal to inspection, PERRL and conjunctivae and sclerae normal  RESP: lungs clear to auscultation - no rales, rhonchi or wheezes  CV: regular rate and rhythm, normal S1 S2,  ABDOMEN: soft, minimal right upper quadrant tenderness, no hepatosplenomegaly, bowel sounds normal  MS: no gross musculoskeletal defects noted, no edema  SKIN: Maculopapular erythematous rash " diffusely on arms abdomen and legs.  Excoriations on arms noted.  NEURO: mentation intact and speech normal  PSYCH: mentation appears normal, affect normal/bright        Signed Electronically by: Monico Rivers MD

## 2024-01-26 PROBLEM — K75.0 LIVER ABSCESS: Status: ACTIVE | Noted: 2024-01-01

## 2024-01-26 NOTE — ED PROVIDER NOTES
Assumed care at change of shift from Dr. Arun Rodney.  See his note for patient presenting details and initial workup.  Briefly, this is a 73-year-old male who has history of liver abscesses and recurrent intra-abdominal infections.  Had been recently treated after consultation with infectious disease, had started on Levaquin.  He developed a rash and the Levaquin was discontinued as it was thought to be the causative agent.  ID had recommended transition to oral linezolid, but he needed to stop Celexa and Cymbalta in order to transition to this medication.  Has not yet started linezolid.  Last night noted fevers of 100.7  F that increased to 101.4  F today.  Presented to the ED for further workup.  Evaluation was concerning for sepsis.  Had consulted infectious disease at [place], and they recommended to get cultures, and start treatment with broad-spectrum antibiotics, vancomycin and Rocephin, and can transition to alternative therapy later.  Would treat as sepsis without known source.  Patient has been hypotensive but was fluid responsive.  Was on 50 mL/h normal saline infusion overnight.  Just received another bolus at 4 AM.  Repeat lactic acid level is pending.  Was asked to assume care of this patient to follow-up on lactic acid level and to await available bed for placement    Patient continued to have soft blood pressures throughout ED stay.  Unfortunately, due to high volume of critically ill patients and need for active resuscitation in other rooms by this provider, had remained hypotensive but was not tachycardic.  He was mentating appropriately.  He had received total of 4 L of IV fluids, which should have been an adequate 30 mL/kg bolus to treat sepsis.  Will need to initiate the patient on pressors.  Suspect that he may not be tachycardic due to the blocker therapy.  Unfortunately, if he is on pressors would not be able to keep him locally as there are no ICU beds available.    Bed became available at  [facility].  Patient will be transferred by ambulance since he will need to have pressors and IV fluids, antibiotics and monitoring.  He left the department in stable condition     Maura Beckett,   01/28/24 1734

## 2024-01-26 NOTE — ED TRIAGE NOTES
Pt here for evaluation of fever in light of a known liver abscess. CT scan and labs done at [facility] today. Off antibiotics now for 5 days due to reaction (head to toe rash per report). Temps at home as follows: 1830 100.7, 1930 101.4. 650 mg Tylenol taken at 1830. Hypotensive in triage, BP 79/47. Earlier today /60. Normally 100-110 systolic.     Triage Assessment (Adult)       Row Name 01/25/24 2007          Triage Assessment    Airway WDL WDL        Respiratory WDL    Respiratory WDL WDL        Skin Circulation/Temperature WDL    Skin Circulation/Temperature WDL WDL        Cardiac WDL    Cardiac WDL WDL     Cardiac Rhythm NSR        Peripheral/Neurovascular WDL    Peripheral Neurovascular WDL WDL        Cognitive/Neuro/Behavioral WDL    Cognitive/Neuro/Behavioral WDL WDL

## 2024-01-26 NOTE — ED PROVIDER NOTES
History     Chief Complaint   Patient presents with    Fever     HPI  Quan Murphy is a 73 year old male with a complex medical history including oropharynx squamous cell carcinoma s/p chemo/radiation, anxiety, coronary disease, CKD, liver ablation and gallstones complicated by enteric coccus bacteremia who presents for evaluation of a fever.    The following is an excerpt from an infectious disease clinic note through [facility] on 1/22:  Mr. Murphy is a 73 y.o. male   with history of oropharynx squamous cell carcinoma status post chemoradiation/chemotherapy and ablation of liver lesion in 09/2023.  He has had persistent hepatic lesion/fluid collection at the ablation site. Recent PET CT (12/6) showed FDG avid lobulated soft tissue/fluid associated with the ablation site in the right hepatic lobe.  He was admitted for further workup.  US guided biopsy (12/12) produced 5 mL of purulent fluid and tissue samples.  Tissue culture had microbial growth and fluid culture was negative.   Pathology on the tissue samples was negative for malignancy but did show evidence of an organizing abscess with granulation tissue.  Drain could not be placed due to location. He was discharged to complete a 2 week course of Zosyn.  After discharge, the liver tissue culture grew Enterococcus Hirae which is resistant to Penicillin and intermediate to Daptomycin.  He was then switched to Vancomycin and Ceftriaxone (12/18).       Repeat imaging at outside facility showed minimal improvement in the hypoenhancing soft tissue density surrounding the ablation cavity.  Previously it was measured 7.1 x 3.6 by 5.2 cm on 12/26/2023 and is currently 6.2 x 5.2 x 2.8 cm.  It initially was 7.1 x 6.6 x 3 cm when he was admitted with abdominal pain 12/11/2023. Mr. Murphy declined having PICC line replaced after it was removed due to malfunction after fall.  It was then decided to try levofloxacin which had a COLIN < 0.5 for Enterococcus Hirae.        Jeffrey initiated levofloxacin on 01/12/2024.  He then had a biliary stent exchange on 01/15/2024.  He noticed rash on left upper arm where blood pressure cuff was for the procedure  on 1/15. The rash was not itchy or painful.  Over the weekend the rash spread and became itchy. He was seen by a local Dermatologist this morning and Levaquin was discontinued as it was thought to be the cause of the rash.     Patient recently stopped Celexa and Cymbalta in order to facilitate transition to oral linezolid, per ID recommendations.  Starting last night he noted low-grade fevers of 100.7 that increased to 101.4 today.  This has been improving temporarily with Tylenol.  Borderline low blood pressures were also noted at home, so he came in for evaluation.  In the ED, patient reports that his rash has been improving overall.  He does feel weak/fatigued but otherwise denies any focal infectious symptoms such as cough, new abdominal pain, urinary symptoms, or other complaints.    Results from CT performed through clinic today are as follows:  1.  Multiple nodular observations within the liver. Previously noted ablation cavity at segment 8 measures larger compared to 1/9/2024. Conversely, the adjacent soft tissue abutting the hepatic capsule measures smaller.  2.  Other several hepatic nodular lesions suggesting metastatic disease again identified. Slightly larger lesions at segment 7, and newly visualized lesions at segment 2 and segment 5 are noted. As such, new areas of metastatic disease are possible. There are also stable lesions.  3.  Stable hypodense lesion at the pancreas neck that may be further evaluated with pancreas MRI.    Allergies:  Allergies   Allergen Reactions    Levofloxacin Rash     Head to toe    Animal Dander     Augmentin [Amoxicillin-Pot Clavulanate] GI Disturbance    Azithromycin Nausea and Vomiting    Dust Mites     Pollen Extract     Smoke.        Problem List:    Patient Active Problem List     Diagnosis Date Noted    Nausea with vomiting 11/03/2023     Priority: Medium    Metabolic acidosis, normal anion gap (NAG) 10/31/2023     Priority: Medium    Oropharyngeal dysphagia 10/31/2023     Priority: Medium    Elevated alkaline phosphatase level 10/31/2023     Priority: Medium    Bile salt-induced diarrhea 10/31/2023     Priority: Medium    Hypophosphatemia 10/30/2023     Priority: Medium    Hypoalbuminemia 10/30/2023     Priority: Medium    Anemia, unspecified type 10/30/2023     Priority: Medium    Elevated lactic acid level 10/30/2023     Priority: Medium    Abnormal chest CT-RLL opacity 10/30/2023     Priority: Medium    Abnormal abdominal CT scan-new extrahepatic 2 cm low attenuation area with fat stranding 10/30/2023     Priority: Medium    Inadequate oral nutritional intake 10/29/2023     Priority: Medium    Metastatic cancer to liver (H) 10/29/2023     Priority: Medium    History of bacteremia-Enterococcus Sept 2023 10/29/2023     Priority: Medium    S/P ERCP, sphincterotomy with stent placement, lap cholecystectomy 10/16-10/18/23 10/29/2023     Priority: Medium    Hypokalemia 10/28/2023     Priority: Medium    Gallstone pancreatitis 10/15/2023     Priority: Medium    Acute cholecystitis 10/07/2023     Priority: Medium    Hypothyroidism due to acquired atrophy of thyroid 03/09/2023     Priority: Medium    Malignant neoplasm metastatic to bone (H) 01/17/2023     Priority: Medium    Pancytopenia (H) 08/16/2022     Priority: Medium    Uncomplicated opioid dependence (H)      Priority: Medium    Chronic kidney disease, stage 3 (H) 06/15/2021     Priority: Medium    Failure to thrive (0-17) 06/02/2021     Priority: Medium     Replacing diagnoses that were inactivated after the 10/1/2021 regulatory import.      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     Priority: Medium    Hypomagnesemia 04/13/2021     Priority: Medium    Hiatal hernia 02/17/2020     Priority: Medium    Renal hematoma  10/28/2017     Priority: Medium    Retroperitoneal hematoma 10/28/2017     Priority: Medium    Syncope 10/18/2017     Priority: Medium    S/P ORIF (open reduction internal fixation) fracture 08/03/2017     Priority: Medium    Closed Colles' fracture of right radius with routine healing, subsequent encounter 08/03/2017     Priority: Medium    Status post insertion of drug-eluting stent into left anterior descending artery for coronary artery disease 01/05/2017     Priority: Medium    Chest pain 12/24/2016     Priority: Medium    Arthropathy 02/04/2014     Priority: Medium     Problem list name updated by automated process. Provider to review      Coronary atherosclerosis      Priority: Medium     Coronary artery disease  Problem list name updated by automated process. Provider to review      Hallux rigidus 07/16/2013     Priority: Medium    Inguinal hernia 06/28/2013     Priority: Medium    Degenerative arthritis of foot 06/28/2013     Priority: Medium    Hypertension goal BP (blood pressure) < 140/90 06/12/2012     Priority: Medium    Hyperlipidemia LDL goal <130 12/22/2011     Priority: Medium    Anxiety 11/02/2011     Priority: Medium    Allergic conjunctivitis 07/08/2008     Priority: Medium    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, insomnia type 11/17/2006     Priority: Medium     Problem list name updated by automated process. Provider to review      Acute reaction to stress 01/12/2005     Priority: Medium     Problem list name updated by automated process. Provider to review      Chronic maxillary sinusitis 01/12/2005     Priority: Medium    Contracture of palmar fascia 01/12/2005     Priority: Medium    Benign neoplasm of skin of other and unspecified parts of face 01/12/2005     Priority: Medium    Malignant neoplasm of prostate (H) 11/26/2004     Priority: Medium        Past Medical History:    Past Medical History:   Diagnosis Date    Allergic rhinitis     Allergy, unspecified not elsewhere  classified     Antiplatelet or antithrombotic long-term use     Anxiety     Arthritis     Chest pain     Chronic sinusitis     Coronary atherosclerosis of unspecified type of vessel, native or graft     Depressive disorder 1995    Gastroesophageal reflux disease 2020    Head injury 1954    Hiatal hernia 2015    History of blood transfusion 12/15/2004    Hyperlipidemia     Hypertension     Inguinal hernia     Kidney problem 10/08/2017    Kidney stones     Malignant neoplasm of prostate (H)     Nausea with vomiting 11/03/2023    Prostate cancer (H)     Syncope     Thyroid disease     Tonsil cancer (H)        Past Surgical History:    Past Surgical History:   Procedure Laterality Date    ABDOMEN SURGERY      ARTHRODESIS FOOT  07/23/2013    Procedure: ARTHRODESIS FOOT;  Great Toe Arthrodesis Left Foot;  Surgeon: Ash Gonzalez DPM;  Location: [facility]    ARTHRODESIS FOOT  06/10/2014    Procedure: ARTHRODESIS FOOT;  Surgeon: Ash Gonzalez DPM;  Location: [facility]    BIOPSY  10/01/2004    dermatologist biopsies    COLONOSCOPY  10/07/2013    Procedure: COLONOSCOPY;  Colonoscopy;  Surgeon: Mike Fallon MD;  Location:  GI    CYSTOSCOPY N/A 02/16/2022    Procedure: CYSTOSCOPY and bladder stone removal;  Surgeon: David Rogers MD;  Location:  OR    ENDOSCOPIC RETROGRADE CHOLANGIOPANCREATOGRAM N/A 1/15/2024    Procedure: ENDOSCOPIC RETROGRADE CHOLANGIOPANCREATOGRAPHY, WITH stent removal;  Surgeon: Trent Blood MD;  Location:  OR    ENDOSCOPIC RETROGRADE CHOLANGIOPANCREATOGRAM COMPLEX N/A 10/16/2023    Procedure: ENDOSCOPIC RETROGRADE CHOLANGIOPANCREATOGRAPHY, COMPLEX;  Surgeon: Trent Blood MD;  Location:  OR    ENDOSCOPIC ULTRASOUND UPPER GASTROINTESTINAL TRACT (GI) N/A 10/16/2023    Procedure: Endoscopic ultrasound upper gastrointestinal tract (GI);  Surgeon: Trent Blood MD;  Location:  OR    ENT SURGERY      ESOPHAGOSCOPY, GASTROSCOPY, DUODENOSCOPY (EGD), COMBINED N/A  02/12/2020    Procedure: ESOPHAGOGASTRODUODENOSCOPY (EGD);  Surgeon: Sam Escobar MD;  Location: [facility]    EXTRACORPOREAL SHOCK WAVE LITHOTRIPSY (ESWL) Bilateral 10/18/2017    Procedure: EXTRACORPOREAL SHOCK WAVE LITHOTRIPSY (ESWL);  BILATERAL EXTRACORPOREAL SHOCKWAVE LITHOTRIPSY ;  Surgeon: Meir Torres MD;  Location: [facility] OR    HC CORRECT BUNION,SIMPLE  08/11/2005    x3    HC REMV TOE BENIGN BONE LESN  08/11/2005    HEAD & NECK SURGERY  1954    From a fall    HERNIORRHAPHY INGUINAL  07/03/2013    Procedure: HERNIORRHAPHY INGUINAL;  Open Repair Inguinal hernia Right with mesh ;  Surgeon: Sam Escobar MD;  Location: [facility]    IR GASTROSTOMY TUBE PERCUTANEOUS PLCMNT  06/07/2021    LAPAROSCOPIC CHOLECYSTECTOMY N/A 10/18/2023    Procedure: CHOLECYSTECTOMY, LAPAROSCOPIC;  Surgeon: Dannie Warner MD;  Location: [facility] OR    MOHS MICROGRAPHIC PROCEDURE  08/23/2011    ear and chin-[facility]    OPEN REDUCTION INTERNAL FIXATION WRIST Right 07/18/2017    Procedure: OPEN REDUCTION INTERNAL FIXATION WRIST;  Right distal radius open reduction and internal fixation;  Surgeon: Pedro Blanca DO;  Location: [facility]    RECONSTRUCT FOREFOOT WITH METATARSOPHALANGEAL (MTP) FUSION  06/10/2014    Procedure: RECONSTRUCT FOREFOOT WITH METATARSOPHALANGEAL (MTP) FUSION;  Surgeon: Ash Gonzalez DPM;  Location: [facility]    STENT, CORONARY, DEMI      SURGICAL HISTORY OF -   1999/1974    lt knee    SURGICAL HISTORY OF -   10/2004    lithotripsy    SURGICAL HISTORY OF -   11/2005    angiogram with stent    VASCULAR SURGERY  11/17/2005    Puncture of iliac artery during and angiogram    [facility] LAPAROSCOPY, SURGICAL PROSTATECTOMY, RETROPUBIC RADICAL, W/NERVE SPARING  11/30/2004    With full bilateral pelvic lymphadenectomy.  [facility].    [facility] TOTAL KNEE ARTHROPLASTY  05/01/2008    Left knee       Family History:    Family History   Problem Relation Age of Onset    Hypertension Father         Lived to age 87     "Connective Tissue Disorder Mother         LUPUS    Heart Disease Mother         Valve replacement    Anxiety Disorder Mother         Lived to age 84    Dementia Mother         Nursing Home (lived to age 86)       Social History:  Marital Status:   [2]  Social History     Tobacco Use    Smoking status: Never    Smokeless tobacco: Never   Vaping Use    Vaping Use: Never used   Substance Use Topics    Alcohol use: Yes     Alcohol/week: 10.0 standard drinks of alcohol     Types: 10 Cans of beer per week     Comment: Moderate - 1 to two beers per day. ( None lately).    Drug use: No        Medications:    acetaminophen (TYLENOL) 325 MG tablet  aspirin 81 MG EC tablet  cetirizine (ZYRTEC) 10 MG tablet  famotidine (PEPCID) 20 MG tablet  hydrOXYzine HCl (ATARAX) 25 MG tablet  levothyroxine (SYNTHROID/LEVOTHROID) 88 MCG tablet  melatonin 5 MG tablet  metoprolol succinate ER (TOPROL XL) 50 MG 24 hr tablet  oxyCODONE (ROXICODONE) 5 MG tablet  pregabalin (LYRICA) 150 MG capsule  rosuvastatin (CRESTOR) 40 MG tablet  zolpidem (AMBIEN) 10 MG tablet  naloxone (NARCAN) 4 MG/0.1ML nasal spray  nitroGLYcerin (NITROSTAT) 0.4 MG sublingual tablet  ondansetron (ZOFRAN ODT) 4 MG ODT tab  ondansetron (ZOFRAN) 4 MG tablet      Review of Systems   All other systems reviewed and are negative.  See HPI.    Physical Exam   BP: (!) 79/47  Pulse: 111  Temp: 98.2  F (36.8  C)  Resp: 18  Height: 172.7 cm (5' 8\")  Weight: 90.7 kg (200 lb)  SpO2: 100 %      Physical Exam  Vitals and nursing note reviewed.   Constitutional:       General: He is not in acute distress.     Appearance: Normal appearance. He is not toxic-appearing.      Comments: Patient appears weak, somewhat pale, but is fully alert and answering questions appropriately.   HENT:      Head: Normocephalic and atraumatic.      Mouth/Throat:      Mouth: Mucous membranes are dry.      Pharynx: No oropharyngeal exudate or posterior oropharyngeal erythema.   Eyes:      General: No " scleral icterus.     Conjunctiva/sclera: Conjunctivae normal.   Cardiovascular:      Rate and Rhythm: Normal rate and regular rhythm.      Heart sounds: Normal heart sounds.   Pulmonary:      Effort: Pulmonary effort is normal. No respiratory distress.      Breath sounds: Normal breath sounds. No wheezing or rhonchi.      Comments: No obvious respiratory distress.  Speaking comfortably in full sentences.  Abdominal:      General: There is no distension.      Palpations: Abdomen is soft.      Tenderness: There is no abdominal tenderness. There is no guarding or rebound.      Comments: Abdomen is entirely nontender.  No rebound or guarding.   Musculoskeletal:         General: No deformity. Normal range of motion.      Cervical back: Normal range of motion and neck supple. No tenderness.      Right lower leg: No edema.      Left lower leg: No edema.   Skin:     General: Skin is warm.      Findings: Erythema and rash present.      Comments: Patient has diffuse areas of raised erythematous, macular appearing rash.  There are fairly large areas of palpable rash to likely skin and significant portions of his torso.  See attached photos.  No particular areas of erythema that would indicate superimposed cellulitis.  No areas of induration or fluctuance.  Negative Nikolsky sign and he has no control involvement.   Neurological:      Mental Status: He is alert.         ED Course            ED Course as of 01/26/24 1607   Thu Jan 25, 2024 2113 Case was discussed with on-call [place] infectious disease specialist, Dr. Singleton.  Given fever of unknown source, labile blood pressures, agreed with plans for admission and at least temporary initiation of IV antibiotics.  Based on prior history, agrees with plans for vancomycin and Rocephin.     Procedures                Results for orders placed or performed during the hospital encounter of 01/25/24 (from the past 24 hour(s))   CBC with platelets differential    Narrative    The  following orders were created for panel order CBC with platelets differential.  Procedure                               Abnormality         Status                     ---------                               -----------         ------                     CBC with platelets and d...[[id number]]  Abnormal            Final result                 Please view results for these tests on the individual orders.   Comprehensive metabolic panel   Result Value Ref Range    Sodium 130 (L) 135 - 145 mmol/L    Potassium 4.3 3.4 - 5.3 mmol/L    Carbon Dioxide (CO2) 16 (L) 22 - 29 mmol/L    Anion Gap 15 7 - 15 mmol/L    Urea Nitrogen 24.6 (H) 8.0 - 23.0 mg/dL    Creatinine 1.77 (H) 0.67 - 1.17 mg/dL    GFR Estimate 40 (L) >60 mL/min/1.73m2    Calcium 8.2 (L) 8.8 - 10.2 mg/dL    Chloride 99 98 - 107 mmol/L    Glucose 96 70 - 99 mg/dL    Alkaline Phosphatase 257 (H) 40 - 150 U/L    AST 62 (H) 0 - 45 U/L    ALT 64 0 - 70 U/L    Protein Total 5.7 (L) 6.4 - 8.3 g/dL    Albumin 2.9 (L) 3.5 - 5.2 g/dL    Bilirubin Total 0.5 <=1.2 mg/dL   Lipase   Result Value Ref Range    Lipase 11 (L) 13 - 60 U/L   Lactic acid whole blood w/ reflex x1 in 3 Hr when >2   Result Value Ref Range    Lactic Acid, Initial 2.5 (H) 0.7 - 2.0 mmol/L   Procalcitonin   Result Value Ref Range    Procalcitonin 0.37 <0.50 ng/mL   CRP inflammation   Result Value Ref Range    CRP Inflammation 27.70 (H) <5.00 mg/L   Erythrocyte sedimentation rate auto   Result Value Ref Range    Erythrocyte Sedimentation Rate 10 0 - 20 mm/hr   Blood Culture Arm, Left    Specimen: Arm, Left; Blood   Result Value Ref Range    Culture No growth after 12 hours    CBC with platelets and differential   Result Value Ref Range    WBC Count 9.7 4.0 - 11.0 10e3/uL    RBC Count 4.46 4.40 - 5.90 10e6/uL    Hemoglobin 12.0 (L) 13.3 - 17.7 g/dL    Hematocrit 37.4 (L) 40.0 - 53.0 %    MCV 84 78 - 100 fL    MCH 26.9 26.5 - 33.0 pg    MCHC 32.1 31.5 - 36.5 g/dL    RDW 18.6 (H) 10.0 - 15.0 %    Platelet  Count 180 150 - 450 10e3/uL    % Neutrophils 88 %    % Lymphocytes 7 %    % Monocytes 3 %    % Eosinophils 2 %    % Basophils 0 %    % Immature Granulocytes 0 %    NRBCs per 100 WBC 0 <1 /100    Absolute Neutrophils 8.5 (H) 1.6 - 8.3 10e3/uL    Absolute Lymphocytes 0.7 (L) 0.8 - 5.3 10e3/uL    Absolute Monocytes 0.3 0.0 - 1.3 10e3/uL    Absolute Eosinophils 0.2 0.0 - 0.7 10e3/uL    Absolute Basophils 0.0 0.0 - 0.2 10e3/uL    Absolute Immature Granulocytes 0.0 <=0.4 10e3/uL    Absolute NRBCs 0.0 10e3/uL   Symptomatic Influenza A/B, RSV, & SARS-CoV2 PCR (COVID-19) Nose    Specimen: Nose; Swab   Result Value Ref Range    Influenza A PCR Negative Negative    Influenza B PCR Negative Negative    RSV PCR Negative Negative    SARS CoV2 PCR Negative Negative    Narrative    Testing was performed using the Xpert Xpress CoV2/Flu/RSV Assay on the Cepheid GeneXpert Instrument. This test should be ordered for the detection of SARS-CoV-2, influenza, and RSV viruses in individuals who meet clinical and/or epidemiological criteria. Test performance is unknown in asymptomatic patients. This test is for in vitro diagnostic use under the FDA EUA for laboratories certified under CLIA to perform high or moderate complexity testing. This test has not been FDA cleared or approved. A negative result does not rule out the presence of PCR inhibitors in the specimen or target RNA in concentration below the limit of detection for the assay. If only one viral target is positive but coinfection with multiple targets is suspected, the sample should be re-tested with another FDA cleared, approved, or authorized test, if coinfection would change clinical management. This test was validated by the [facility]. These laboratories are certified under the Clinical Laboratory Improvement Amendments of 1988 (CLIA-88) as qualified to perform high complexity laboratory testing.   Blood Culture Arm, Right    Specimen: Arm, Right; Blood    Result Value Ref Range    Culture No growth after 12 hours     Narrative    Only an Aerobic Blood Culture Bottle was collected, interpret results with caution.       UA with Microscopic reflex to Culture    Specimen: Urine, Midstream   Result Value Ref Range    Color Urine Yellow Colorless, Straw, Light Yellow, Yellow    Appearance Urine Clear Clear    Glucose Urine Negative Negative mg/dL    Bilirubin Urine Negative Negative    Ketones Urine Negative Negative mg/dL    Specific Gravity Urine 1.036 (H) 1.003 - 1.035    Blood Urine Negative Negative    pH Urine 6.0 5.0 - 7.0    Protein Albumin Urine Negative Negative mg/dL    Urobilinogen Urine Normal Normal, 2.0 mg/dL    Nitrite Urine Negative Negative    Leukocyte Esterase Urine Negative Negative    Mucus Urine Present (A) None Seen /LPF    RBC Urine <1 <=2 /HPF    WBC Urine 0 <=5 /HPF    Squamous Epithelials Urine <1 <=1 /HPF    Narrative    Urine Culture not indicated   XR Chest 2 Views    Narrative    EXAM: XR CHEST 2 VIEWS  LOCATION: [facility]  DATE: 1/25/2024    INDICATION: Fever of undetermined source, rule out pneumonia  COMPARISON: CT chest 10/29/2023, radiograph chest 04/11/2024      Impression    IMPRESSION: Normal heart size. Mild subsegmental atelectasis at the left base. No evidence for CHF or pneumonia. No pleural effusion or pneumothorax. Moderate mid thoracic scoliosis.   Lactic acid whole blood   Result Value Ref Range    Lactic Acid 1.9 0.7 - 2.0 mmol/L   Lactic acid whole blood   Result Value Ref Range    Lactic Acid 2.2 (H) 0.7 - 2.0 mmol/L   CBC with platelets differential    Narrative    The following orders were created for panel order CBC with platelets differential.  Procedure                               Abnormality         Status                     ---------                               -----------         ------                     CBC with platelets and d...[[id number]]  Abnormal            Final  result                 Please view results for these tests on the individual orders.   Lactic acid whole blood   Result Value Ref Range    Lactic Acid 2.9 (H) 0.7 - 2.0 mmol/L   Basic metabolic panel   Result Value Ref Range    Sodium 131 (L) 135 - 145 mmol/L    Potassium 4.6 3.4 - 5.3 mmol/L    Chloride 104 98 - 107 mmol/L    Carbon Dioxide (CO2) 17 (L) 22 - 29 mmol/L    Anion Gap 10 7 - 15 mmol/L    Urea Nitrogen 31.2 (H) 8.0 - 23.0 mg/dL    Creatinine 2.05 (H) 0.67 - 1.17 mg/dL    GFR Estimate 34 (L) >60 mL/min/1.73m2    Calcium 7.7 (L) 8.8 - 10.2 mg/dL    Glucose 100 (H) 70 - 99 mg/dL   CBC with platelets and differential   Result Value Ref Range    WBC Count 9.9 4.0 - 11.0 10e3/uL    RBC Count 4.11 (L) 4.40 - 5.90 10e6/uL    Hemoglobin 11.0 (L) 13.3 - 17.7 g/dL    Hematocrit 35.2 (L) 40.0 - 53.0 %    MCV 86 78 - 100 fL    MCH 26.8 26.5 - 33.0 pg    MCHC 31.3 (L) 31.5 - 36.5 g/dL    RDW 18.7 (H) 10.0 - 15.0 %    Platelet Count 161 150 - 450 10e3/uL    % Neutrophils 80 %    % Lymphocytes 12 %    % Monocytes 5 %    % Eosinophils 2 %    % Basophils 0 %    % Immature Granulocytes 1 %    NRBCs per 100 WBC 0 <1 /100    Absolute Neutrophils 8.0 1.6 - 8.3 10e3/uL    Absolute Lymphocytes 1.2 0.8 - 5.3 10e3/uL    Absolute Monocytes 0.5 0.0 - 1.3 10e3/uL    Absolute Eosinophils 0.2 0.0 - 0.7 10e3/uL    Absolute Basophils 0.0 0.0 - 0.2 10e3/uL    Absolute Immature Granulocytes 0.1 <=0.4 10e3/uL    Absolute NRBCs 0.0 10e3/uL     *Note: Due to a large number of results and/or encounters for the requested time period, some results have not been displayed. A complete set of results can be found in Results Review.       Medications   vancomycin (VANCOCIN) 1,250 mg in sodium chloride 0.9 % 250 mL intermittent infusion (0 mg Intravenous Stopped 1/25/24 3865)   cefTRIAXone (ROCEPHIN) 1 g vial to attach to  mL bag for ADULTS or NS 50 mL bag for PEDS (has no administration in time range)   morphine (PF) injection 4 mg (4 mg  Intravenous $Given 1/26/24 1443)   norepinephrine (LEVOPHED) 4 mg in  mL PERIPHERAL infusion ( Intravenous Not Given 1/26/24 1552)   famotidine (PEPCID) tablet 20 mg (20 mg Oral $Given 1/26/24 1429)   sodium chloride 0.9% BOLUS 1,000 mL (0 mLs Intravenous Stopped 1/25/24 2253)   cefTRIAXone (ROCEPHIN) 1 g vial to attach to  mL bag for ADULTS or NS 50 mL bag for PEDS (0 g Intravenous Stopped 1/25/24 2218)   morphine (PF) injection 4 mg (4 mg Intravenous $Given 1/26/24 0214)   sodium chloride 0.9% BOLUS 1,000 mL (0 mLs Intravenous Stopped 1/26/24 0012)   sodium chloride 0.9% BOLUS 1,000 mL (0 mLs Intravenous Stopped 1/26/24 0304)   acetaminophen (TYLENOL) tablet 975 mg (975 mg Oral $Given 1/26/24 0245)   sodium chloride 0.9% BOLUS 1,000 mL (0 mLs Intravenous Stopped 1/26/24 0800)   hydrOXYzine HCl (ATARAX) tablet 25 mg (25 mg Oral $Given 1/26/24 0735)       Assessments & Plan (with Medical Decision Making)     I have reviewed the nursing notes.    I have reviewed the findings, diagnosis, plan and need for follow up with the patient.  Medical Decision Making  Quan Murphy is a 73 year old male with a complex medical history including oropharynx squamous cell carcinoma s/p chemo/radiation, anxiety, coronary disease, CKD, liver ablation and gallstones complicated by enteric coccus bacteremia who presents for evaluation of a fever.  Please see HPI for details regarding extensive events over the last several months.  Patient presents today for onset of fever in the context of recent bacteremia, PICC line for IV antibiotics, and failed transition to oral antibiotics due to severe skin rash/drug reaction.  Patient was hypotensive on arrival, 79/47, and also tachycardic.  Blood pressure improved significantly without intervention, systolic values in the 90s at the time of my exam.  Heart rate also normalized spontaneously.  He was afebrile on arrival but did have a fever at home that improved with Tylenol.  Due  to his recent extensive history I am concerned about the possibility of bacteremia and we therefore kalie cultures, started him empirically on vancomycin and Rocephin.  These were the medications that he was taking previously before the PICC line was removed and this should provide fairly broad coverage for any other new sources as well.  Patient does have a very diffuse rash as pictured above.  This has been improving over the last several days and seems consistent with a drug side effect/eruption.  I see no signs of serious skin infection such as cellulitis SJS/TE, and I do not think this is related to his fever.    Labs revealed chronic anemia with no leukocytosis.  He did have elevated BUN to creatinine ratio, SURESH, and modestly elevated alkaline phosphatase, largely similar to prior values.  Initial lactic acid was elevated at 2.5, but procalcitonin was within normal limits.  Blood pressures were labile at times but consistently improved with fluid resuscitation and his heart rate was largely normal throughout.  CRP was slightly elevated but  ESR was normal.  Viral panel was negative.  Urinalysis and chest x-ray showed no signs of infection or other acute abnormalities.  Patient had a CT of the abdomen performed in clinic earlier in the day which showed multiple nodular appearing lesions in the liver with some changes to the previously visualized ablation cavity and hepatic capsule.  Some of the nodular appearing lesions appear larger than previous studies concerning for progression of malignancy.  These findings are consistent with known issues.    I was able to discuss his case with the infectious disease specialists through [place].  They agree with plans to restart empiric antibiotics and with admission due to possible fever and soft blood pressures.  It may be possible that he can transition to linezolid, as planned through clinic, but only after he improves clinically and is off of some of his old medications  that are contraindicated with this antibiotic.  Patient was agreeable to admission.  Unfortunately, our hospital is at capacity and there are no beds within the [place] system.  We reached out to all nearby health systems and no beds are available in the surrounding areas.  Patient subsequently boarded in our emergency department for several hours until shift change.  He had soft blood pressures again at times throughout the night, systolic values occasionally reaching into the 70s.  Heart rate was consistently normal and repeated perfusion assessments  for normal.  I do think he is dry clinically and over the course of his entire visit, 3 L of saline was given.  There are no signs of volume overload at shift change.  We will repeat lactic acid and determine if additional fluids the patient.  I do not think a support indicated at this time  And the patient is largely asymptomatic.  Case will be signed out to my colleague, Dr. Beckett, who will follow-up on transfer.    New Prescriptions    No medications on file       Final diagnoses:   Fever in adult   History of bacteremia   Sepsis with acute organ dysfunction without septic shock, due to unspecified organism, unspecified organ dysfunction type (H)       1/25/2024   [facility] EMERGENCY DEPT       Arun Rodney MD  01/26/24 8620

## 2024-01-26 NOTE — CONSULTS
Pharmacy Vancomycin Initial Note  Date of Service 2024  Patient's  1950  73 year old, male    Indication: Abscess    Current estimated CrCl = Estimated Creatinine Clearance: 46.2 mL/min (A) (based on SCr of 1.58 mg/dL (H)).    Creatinine for last 3 days  2024: 12:32 PM Creatinine 1.58 mg/dL    Recent Vancomycin Level(s) for last 3 days  2024: 12:32 PM Vancomycin <4.0 ug/mL      Vancomycin IV Administrations (past 72 hours)        No vancomycin orders with administrations in past 72 hours.                    Nephrotoxins and other renal medications (From now, onward)      None            Contrast Orders - past 72 hours (72h ago, onward)      None            [employer] Prediction of Planned Initial Vancomycin Regimen  Regimen: 1250 mg IV every 24 hours.  Start time: 20:37 on 2024  Exposure target: AUC24 (range)400-600 mg/L.hr   AUC24,ss: 499 mg/L.hr  Probability of AUC24 > 400: 75 %  Ctrough,ss: 15.7 mg/L  Probability of Ctrough,ss > 20: 27 %  Probability of nephrotoxicity (Lodise JAYASHREE ): 11 %          Plan:  Start vancomycin  1250 mg IV q24h.   Vancomycin monitoring method: AUC  Vancomycin therapeutic monitoring goal: 400-600 mg*h/L  Pharmacy will check vancomycin levels as appropriate in 1-3 Days.    Serum creatinine levels will be ordered daily for the first week of therapy and at least twice weekly for subsequent weeks.      Rodrigue Oleary, [facility]

## 2024-01-26 NOTE — ED NOTES
Report to RN at [facility] 67 pt will go to room 612. Pt is stable. Has been resting. VS WNL.Wife at bedside. EMTALA signed.

## 2024-01-26 NOTE — MEDICATION SCRIBE - ADMISSION MEDICATION HISTORY
Medication Scribe Admission Medication History    Admission medication history is complete. The information provided in this note is only as accurate as the sources available at the time of the update.    Information Source(s): Patient, Family member, and CareEverywhere/SureScripts via in-person and phone    Pertinent Information: patient states he took his meds yesterday morning but not last night before coming in. Wife Alessandra went over list with me and frequencies of prns    Changes made to PTA medication list:  Added: None  Deleted: ceftriaxone inj, cipro, celexa, cymbalta, protonix  Changed: zyrtec to prn    Medication Affordability:       Allergies reviewed with patient and updates made in EHR: yes    Medication History Completed By: SUJIT JIN 1/26/2024 12:35 PM    PTA Med List   Medication Sig Last Dose    acetaminophen (TYLENOL) 325 MG tablet Take 1-2 tablets (325-650 mg) by mouth every 6 hours as needed for mild pain Unknown at unkn    aspirin 81 MG EC tablet Take 81 mg by mouth daily 1/25/2024 at am    cetirizine (ZYRTEC) 10 MG tablet Take 1 tablet (10 mg) by mouth daily (Patient taking differently: Take 10 mg by mouth daily as needed for rhinitis) Unknown at unkn    famotidine (PEPCID) 20 MG tablet Take 20 mg by mouth 2 times daily as needed (heartburn) 1/25/2024 at am    hydrOXYzine HCl (ATARAX) 25 MG tablet Take 1-2 tablets (25-50 mg) by mouth 3 times daily as needed for itching 1/25/2024 at am    levothyroxine (SYNTHROID/LEVOTHROID) 88 MCG tablet Take 1 tablet (88 mcg) by mouth daily 1/25/2024 at am    melatonin 5 MG tablet Take 5 mg by mouth nightly as needed for sleep Past Month at hs    metoprolol succinate ER (TOPROL XL) 50 MG 24 hr tablet Take 0.5 tablets (25 mg) by mouth daily 1/25/2024 at am    oxyCODONE (ROXICODONE) 5 MG tablet TAKE ONE TO TWO TABLETS BY MOUTH EVERY 6 HOURS AS NEEDED FOR SEVERE PAIN - MAX OF 6 TABLETS PER DAY (Patient taking differently: Take 5-10 mg by mouth every 6 hours as  needed for severe pain MAX OF 6 TABLETS PER DAY) 1/25/2024 at am    pregabalin (LYRICA) 150 MG capsule TAKE 1 CAPSULE (150 MG) BY MOUTH 2 TIMES DAILY FOR 30 DAYS 1/25/2024 at am    rosuvastatin (CRESTOR) 40 MG tablet Take 1 tablet (40 mg) by mouth daily 1/25/2024 at am    zolpidem (AMBIEN) 10 MG tablet TAKE ONE TABLET BY MOUTH EVERY NIGHT AT BEDTIME AS NEEDED FOR SLEEP 1/24/2024 at hs

## 2024-01-26 NOTE — ED NOTES
Per ; nicole to given morphine, MAP maintained above 65.    (0) understands/communicates without difficulty

## 2024-01-27 NOTE — H&P
[facility]    History and Physical - Hospitalist Service       Date of Admission:  1/26/2024    Assessment & Plan      Quan Murphy is a 73 year old male admitted on 1/26/2024.  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    Recent complex medical history with multiple hospitalizations and changes to antibiotic plan as detailed in admission HPI.      Septic shock due to liver abscess due to Enterobacter hirae  Lactic acidosis  *abscess initially noted on imaging in late October but was without infectious symptoms, underwent biopsy (drain placement not possible) with positive culture in December  *has been off antibiotics for liver abscess since 1/21  *developed fever in the 24 hours prior to arrival, on admission febrile (100.4), tachycardic (107), hypotensive (79/56) with leukocytosis (12.5) and lactate 2.5  *pressures normalized with 4L IVF and remain stable  *CT abd/pelvis showing slight increase in size of liver abscess; CXR negative    - continue vanco and ceftriaxone  - ID consult  -  ml/hr  - STAT BMP and lactic acid  - blood cultures not drawn in ED, will obtain if spikes fever again    ADDENDUM:  Lactate 2.4, will bolus 1L IVF and repeat in 4 hours    SURESH on CKD stage III  *baseline Cr 0.8-1.0; admission Cr 1.58 but trending up on repeat to 2.0, likely due to sepsis  -  ml/hr  - repeat BMP in AM  - UA  - urine sodium    Hyponatremia  *admission Na 131  - IVF and BMP as above    Suspected drug rash due to levofloxacin  *developed after starting levofloxacin, evaluated by Derm and felt to be drug rash - now slowly improving off levo per patient  - continue PTA atarax  - monitor    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  *previously undergone chemo and  "radiation  *alk phos and AST mildly elevated at admission, stable from prior  - anticipate any treatment will be on hold pending resolution of infection    Recent cholangitis s/p ERCP with sphincterotomy and stent placement 10/16/23, stent exchange 1/15/24  - follow up outpatient    CAD s/p PCI to LAD >20 years ago  HTN  - hold PTA metoprolol as hypotensive earlier in day  - continue PTA aspirin    Hypothyroid  - continue PTA levothyroxine    Prostate cancer s/p prostatectomy, in remission  Noted        Diet:  pureed diet, mildly thick liquids  DVT Prophylaxis: Heparin SQ  Stone Catheter: Not present  Lines: None     Cardiac Monitoring: None  Code Status:  Full code per patient     Clinically Significant Risk Factors Present on Admission              # Hypoalbuminemia: Lowest albumin = 2.9 g/dL at 1/25/2024  8:51 PM, will monitor as appropriate   # Drug Induced Platelet Defect: home medication list includes an antiplatelet medication  # Acute Kidney Injury, unspecified: based on a >150% or 0.3 mg/dL increase in last creatinine compared to past 90 day average, will monitor renal function  # Hypertension: Noted on problem list      # Obesity: Estimated body mass index is 30.41 kg/m  as calculated from the following:    Height as of 1/25/24: 1.727 m (5' 8\").    Weight as of 1/25/24: 90.7 kg (200 lb).       # Financial/Environmental Concerns:           Disposition Plan           Dannie Smith MD  Hospitalist Service  [facility]  Securely message with [employer] (more info)  Text page via [employer] Paging/Directory     ______________________________________________________________________    Chief Complaint   Fever     History is obtained from the patient, chart review including multiple recent admissions.     History of Present Illness   Quan Murphy is a 73 year old male who presents with fever.  He has a very complex recent past medical history as detailed below.    *Underwent ablation of liver " lesion on 9/19/23  *Seen in ED 9/27/23 for abdominal pain but left AMA; blood cultures subsequently returned positive for Enterococcus hirae but he was unable to be contacted so left untreated  *hospitalized 10/7-10/10/23 for cholelithiasis managed without intervention; blood cultures negative but was discharged on augmentin  *hospitalized 10/15-10/19/23 for cholangitis s/p ERCP with sphincterotomy and biliary stenting 10/16 followed by lap jennifer 10/18 during which time he was treated with zosyn, again with negative blood cultures  *hospitalized 10/28-11/4 for malnutrition due to dysphagia and nausea with CT abd showing possible liver abscess but was without infectious symptoms, blood cultures again negative  *hospitalized 12/11-12/16/23 for worsening RUQ pain with PET CT showing active liver lesion; biopsy negative for malignancy but culture positive for Enterococcus hirae and discharged with 2 week course of zosyn (drain could not be placed due to location), blood cultures negative  *followed by ID through [place] since discharge; was switched from zosyn to vanco+ceftriaxone 12/18 after Enterococcus found to be resistant to penicillin and intermediate to daptomycin  *per ID note 1/11/24, repeat imaging 12/26 showed slight decrease in size of liver abscess and vanco/ceft was extended an additional 2 weeks  *he subsequently had a fall with malfunction of his PICC line and declined replacement so vanco/ceft was stopped and initiated on levofloxacin 1/12  *developed diffuse rash and seen by Dermatology who felt was consistent with drug rash and recommended discontinuation of levo which was stopped 1/21  *per ID, he was recommended to taper off celexa and cymbalta and initiate linezolid in about 1 week      Today, he reports developing fever last night to 101.7 with associated chills, nausea, lightheadedness, weakness and fatigue.  He denies any increase in abdominal pain.  Denies cough or dyspnea.  No diarrhea, vomiting,  headache, sore throat, myalgias, arthralgias.  Reports skin rash is slowly improving.  His symptoms continued today, and his wife, who is a retired RN, decided to bring him to the ED for evaluation.        Past Medical History    Past Medical History:   Diagnosis Date    Allergic rhinitis     Allergy, unspecified not elsewhere classified     Seasonal allergies, pollen, dust, smoke and animals    Antiplatelet or antithrombotic long-term use     Anxiety     Arthritis     Chest pain     Chronic sinusitis     Coronary atherosclerosis of unspecified type of vessel, native or graft     Coronary artery disease    Depressive disorder 1995    Gastroesophageal reflux disease 2020    Cleared with medication    Head injury 1954    Hiatal hernia 2015    Right Side    History of blood transfusion 12/15/2004    Prostate Surgery - My own blood    Hyperlipidemia     Hypertension     Inguinal hernia     Kidney problem 10/08/2017    Lithotripsy    Kidney stones     Malignant neoplasm of prostate (H)     Prostate cancer    Nausea with vomiting 11/03/2023    Prostate cancer (H)     Syncope     Thyroid disease     Tonsil cancer (H)        Past Surgical History   Past Surgical History:   Procedure Laterality Date    ABDOMEN SURGERY      ARTHRODESIS FOOT  07/23/2013    Procedure: ARTHRODESIS FOOT;  Great Toe Arthrodesis Left Foot;  Surgeon: Ash Gonzalez DPM;  Location: [facility] OR    ARTHRODESIS FOOT  06/10/2014    Procedure: ARTHRODESIS FOOT;  Surgeon: Ash Gonzalez DPM;  Location: [facility] OR    BIOPSY  10/01/2004    dermatologist biopsies    COLONOSCOPY  10/07/2013    Procedure: COLONOSCOPY;  Colonoscopy;  Surgeon: Mike Fallon MD;  Location:  GI    CYSTOSCOPY N/A 02/16/2022    Procedure: CYSTOSCOPY and bladder stone removal;  Surgeon: David Rogers MD;  Location: [facility] OR    ENDOSCOPIC RETROGRADE CHOLANGIOPANCREATOGRAM N/A 1/15/2024    Procedure: ENDOSCOPIC RETROGRADE CHOLANGIOPANCREATOGRAPHY, WITH stent removal;  Surgeon:  Trent Blood MD;  Location:  OR    ENDOSCOPIC RETROGRADE CHOLANGIOPANCREATOGRAM COMPLEX N/A 10/16/2023    Procedure: ENDOSCOPIC RETROGRADE CHOLANGIOPANCREATOGRAPHY, COMPLEX;  Surgeon: Trent Blood MD;  Location:  OR    ENDOSCOPIC ULTRASOUND UPPER GASTROINTESTINAL TRACT (GI) N/A 10/16/2023    Procedure: Endoscopic ultrasound upper gastrointestinal tract (GI);  Surgeon: Trent Blood MD;  Location:  OR    ENT SURGERY      ESOPHAGOSCOPY, GASTROSCOPY, DUODENOSCOPY (EGD), COMBINED N/A 02/12/2020    Procedure: ESOPHAGOGASTRODUODENOSCOPY (EGD);  Surgeon: Sam Escobar MD;  Location:  GI    EXTRACORPOREAL SHOCK WAVE LITHOTRIPSY (ESWL) Bilateral 10/18/2017    Procedure: EXTRACORPOREAL SHOCK WAVE LITHOTRIPSY (ESWL);  BILATERAL EXTRACORPOREAL SHOCKWAVE LITHOTRIPSY ;  Surgeon: Meir Torres MD;  Location:  OR    HC CORRECT BUNION,SIMPLE  08/11/2005    x3    HC REMV TOE BENIGN BONE LESN  08/11/2005    HEAD & NECK SURGERY  1954    From a fall    HERNIORRHAPHY INGUINAL  07/03/2013    Procedure: HERNIORRHAPHY INGUINAL;  Open Repair Inguinal hernia Right with mesh ;  Surgeon: Sam Escobar MD;  Location:  OR    IR GASTROSTOMY TUBE PERCUTANEOUS PLCMNT  06/07/2021    LAPAROSCOPIC CHOLECYSTECTOMY N/A 10/18/2023    Procedure: CHOLECYSTECTOMY, LAPAROSCOPIC;  Surgeon: Dannie Warner MD;  Location:  OR    MOHS MICROGRAPHIC PROCEDURE  08/23/2011    [facility]    OPEN REDUCTION INTERNAL FIXATION WRIST Right 07/18/2017    Procedure: OPEN REDUCTION INTERNAL FIXATION WRIST;  Right distal radius open reduction and internal fixation;  Surgeon: Pedro Blanca DO;  Location: [facility] OR    RECONSTRUCT FOREFOOT WITH METATARSOPHALANGEAL (MTP) FUSION  06/10/2014    Procedure: RECONSTRUCT FOREFOOT WITH METATARSOPHALANGEAL (MTP) FUSION;  Surgeon: Ash Gonzalez DPM;  Location: [facility] OR    STENT, CORONARY, DEMI      SURGICAL HISTORY OF -   1999/1974    lt knee     SURGICAL HISTORY OF -   10/2004    lithotripsy    SURGICAL HISTORY OF -   11/2005    angiogram with stent    VASCULAR SURGERY  11/17/2005    Puncture of iliac artery during and angiogram    [facility] LAPAROSCOPY, SURGICAL PROSTATECTOMY, RETROPUBIC RADICAL, W/NERVE SPARING  11/30/2004    With full bilateral pelvic lymphadenectomy.  [facility].    [facility] TOTAL KNEE ARTHROPLASTY  05/01/2008    Left knee       Prior to Admission Medications   Prior to Admission Medications   Prescriptions Last Dose Informant Patient Reported? Taking?   acetaminophen (TYLENOL) 325 MG tablet  Spouse/Significant Other No No   Sig: Take 1-2 tablets (325-650 mg) by mouth every 6 hours as needed for mild pain   aspirin 81 MG EC tablet   Yes No   Sig: Take 81 mg by mouth daily   cetirizine (ZYRTEC) 10 MG tablet  Spouse/Significant Other No No   Sig: Take 1 tablet (10 mg) by mouth daily   Patient taking differently: Take 10 mg by mouth daily as needed for rhinitis   famotidine (PEPCID) 20 MG tablet  Spouse/Significant Other Yes No   Sig: Take 20 mg by mouth 2 times daily as needed (heartburn)   hydrOXYzine HCl (ATARAX) 25 MG tablet   No No   Sig: Take 1-2 tablets (25-50 mg) by mouth 3 times daily as needed for itching   levothyroxine (SYNTHROID/LEVOTHROID) 88 MCG tablet   No No   Sig: Take 1 tablet (88 mcg) by mouth daily   melatonin 5 MG tablet   Yes No   Sig: Take 5 mg by mouth nightly as needed for sleep   metoprolol succinate ER (TOPROL XL) 50 MG 24 hr tablet  Spouse/Significant Other No No   Sig: Take 0.5 tablets (25 mg) by mouth daily   naloxone (NARCAN) 4 MG/0.1ML nasal spray  Spouse/Significant Other No No   Sig: Spray 1 spray (4 mg) into one nostril alternating nostrils as needed for opioid reversal every 2-3 minutes until assistance arrives   nitroGLYcerin (NITROSTAT) 0.4 MG sublingual tablet  Spouse/Significant Other No No   Sig: For chest pain place 1 tablet under the tongue every 5 minutes for 3 doses. If symptoms persist 5 minutes after 1st  dose call 911.   ondansetron (ZOFRAN ODT) 4 MG ODT tab   No No   Sig: DISSOLVE ONE TABLET BY MOUTH EVERY 8 HOURS AS NEEDED FOR NAUSEA   ondansetron (ZOFRAN) 4 MG tablet  Spouse/Significant Other No No   Sig: Take 1 tablet (4 mg) by mouth every 8 hours as needed for nausea   oxyCODONE (ROXICODONE) 5 MG tablet   No No   Sig: TAKE ONE TO TWO TABLETS BY MOUTH EVERY 6 HOURS AS NEEDED FOR SEVERE PAIN - MAX OF 6 TABLETS PER DAY   Patient taking differently: Take 5-10 mg by mouth every 6 hours as needed for severe pain MAX OF 6 TABLETS PER DAY   pregabalin (LYRICA) 150 MG capsule   No No   Sig: TAKE 1 CAPSULE (150 MG) BY MOUTH 2 TIMES DAILY FOR 30 DAYS   rosuvastatin (CRESTOR) 40 MG tablet   No No   Sig: Take 1 tablet (40 mg) by mouth daily   zolpidem (AMBIEN) 10 MG tablet   No No   Sig: TAKE ONE TABLET BY MOUTH EVERY NIGHT AT BEDTIME AS NEEDED FOR SLEEP      Facility-Administered Medications: None           Physical Exam   Vital Signs: Temp: 98.7  F (37.1  C) Temp src: Oral BP: 137/68 Pulse: 85   Resp: 18 SpO2: 97 % O2 Device: None (Room air)    Weight: 0 lbs 0 oz    General Appearance: well nourished male in NAD  Eyes: PERRL, sclera anicteric  HEENT: mucous membranes moist  Respiratory: lungs CTAB, no wheezes or crackles  Cardiovascular: RRR, normal s1/s2 without murmur  GI: abdomen soft, normal bowel sounds, nontender  Lymph/Hematologic: mild LE edema   Skin: diffuse blanchable erythematous rash over trunk and extremities  Musculoskeletal: extremities warm and well perfused   Neurologic: alert and appropriate, CN grossly intact   Psychiatric: normal affect     Medical Decision Making       90 MINUTES SPENT BY ME on the date of service doing chart review, history, exam, documentation & further activities per the note.      Data     I have personally reviewed the following data over the past 24 hrs:    9.9  \   11.0 (L)   / 161     132 (L) 104 34.1 (H) /  95   4.9 16 (L) 2.03 (H) \     Procal: N/A CRP: N/A Lactic Acid: 2.4  (H)         Imaging results reviewed over the past 24 hrs:   No results found for this or any previous visit (from the past 24 hour(s)).

## 2024-01-27 NOTE — PLAN OF CARE
Orientation: A&Ox4    Activity: SBA    Diet/BS Checks: Pureed with mildly thick liquids     Tele: N/A    IV Access/Drains: LPIV CDI infusing 1L LR bolus over 4 hr     Pain Management: PRN 10 mg oxy given x1.    Abnormal VS/Results: Tachycardic at times, on RA, pt has yet to urinate for urine samples. Lactic was 2.4, LR bolus started. Repeat lactic at 0200.     Bowel/Bladder: Continent of both, urinal at bedside for lab collet     Skin/Wounds: Rash all over pt's body, scattered scabs and scratches     Consults: ID ordered     D/C Disposition: Pending     Other Info: Prn atarax given x1, prn Ambien given x1.

## 2024-01-27 NOTE — CONSULTS
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     -Severe generalized drug rash with fever and SIRs related to Levaquin with SURESH. Has been off antibiotics since 1/21  -SURESH superimposed on CKD improved  -Hyponatremia  -Pyogenic liver abscess related to Enterococcus hirae following ablation of metastatic liver lesion. 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 s/p chemoradiation/chemotherapy and ablation of liver lesion in 09/2023  -Recent cholangitis s/p ERCP with sphincterotomy and stent placement 10/16/23, stent exchange 1/15/24   -chronic medical conditions CAD s.p PCI, HTN, hypothyroidism, prostate cancer s/p prostatectomy    Recommendations:  Continue Vancomycin, hold ceftriaxone for now  Monitor rash for improvement, may need steroid therapy  Given enlarging size of abscess, would continue to treat with IV vancomycin  ID will continue to follow    Bharti Gore MD    Reason for Consult   Reason for consult: I was asked to evaluate this patient for antimicrobial recommendations for liver abscess with sepsis syndrome.    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  He has had persistent hepatic lesion/fluid collection at the ablation site. Recent PET CT (12/6) showed FDG avid lobulated soft tissue/fluid associated with the ablation site in the right hepatic lobe.  US guided biopsy (12/12) produced 5 mL of purulent fluid and Tissue culture grew Enterococcus hirae.   Pathology on the tissue samples was negative for malignancy but did show evidence of an organizing abscess with granulation tissue.      He was discharged to complete a 2 week course of Zosyn.  After discharge, the liver tissue culture grew Enterococcus Hirae which was resistant to Penicillin and intermediate to Daptomycin.  He was then switched to Vancomycin and Ceftriaxone (12/18).  He declined PICC or IV antibiotics at discharge and was initiated on Levaquin on 1/12/2024 with biliary stent exchange on 1/15/2024 at which point he was noted to have a rash and antibiotics were discontinued on 1/21/2024    The plan was to initiate him on oral Linezolid a week after tapering off his psychotropic medications, but has been admitted with fever in the interim. Upon admission he met sepsis criteria with tachycardia, hypotension and leukocytosis    CT shows Previously noted  ablation cavity at segment 8 measures larger compared to 1/9/2024.  Conversely, the adjacent soft tissue abutting the hepatic capsule  measures smaller. There are additional liver lesions consistent with metastatic disease    Patient continues to complain of severe pruritis    Antimicrobial therapy  1/25 Ceftriaxone, vancomycin    Past Medical History   I have reviewed this patient's medical history and updated it with pertinent information if needed.   Past Medical History:   Diagnosis Date    Allergic rhinitis     Allergy, unspecified not elsewhere classified     Seasonal allergies, pollen, dust, smoke and animals     Antiplatelet or antithrombotic long-term use     Anxiety     Arthritis     Chest pain     Chronic sinusitis     Coronary atherosclerosis of unspecified type of vessel, native or graft     Coronary artery disease    Depressive disorder 1995    Gastroesophageal reflux disease 2020    Cleared with medication    Head injury 1954    Hiatal hernia 2015    Right Side    History of blood transfusion 12/15/2004    Prostate Surgery - My own blood    Hyperlipidemia     Hypertension     Inguinal hernia     Kidney problem 10/08/2017    Lithotripsy    Kidney stones     Malignant neoplasm of prostate (H)     Prostate cancer    Nausea with vomiting 11/03/2023    Prostate cancer (H)     Syncope     Thyroid disease     Tonsil cancer (H)        Past Surgical History   I have reviewed this patient's surgical history and updated it with pertinent information if needed.  Past Surgical History:   Procedure Laterality Date    ABDOMEN SURGERY      ARTHRODESIS FOOT  07/23/2013    Procedure: ARTHRODESIS FOOT;  Great Toe Arthrodesis Left Foot;  Surgeon: Ash Gonzalez DPM;  Location:  OR    ARTHRODESIS FOOT  06/10/2014    Procedure: ARTHRODESIS FOOT;  Surgeon: Ash Gonzalez DPM;  Location: [facility] OR    BIOPSY  10/01/2004    dermatologist biopsies    COLONOSCOPY  10/07/2013    Procedure: COLONOSCOPY;  Colonoscopy;  Surgeon: Mike Fallon MD;  Location:  GI    CYSTOSCOPY N/A 02/16/2022    Procedure: CYSTOSCOPY and bladder stone removal;  Surgeon: David Rogers MD;  Location:  OR    ENDOSCOPIC RETROGRADE CHOLANGIOPANCREATOGRAM N/A 1/15/2024    Procedure: ENDOSCOPIC RETROGRADE CHOLANGIOPANCREATOGRAPHY, WITH stent removal;  Surgeon: Trent Blood MD;  Location:  OR    ENDOSCOPIC RETROGRADE CHOLANGIOPANCREATOGRAM COMPLEX N/A 10/16/2023    Procedure: ENDOSCOPIC RETROGRADE CHOLANGIOPANCREATOGRAPHY, COMPLEX;  Surgeon: Trent Blood MD;  Location:  OR    ENDOSCOPIC ULTRASOUND UPPER GASTROINTESTINAL TRACT (GI)  N/A 10/16/2023    Procedure: Endoscopic ultrasound upper gastrointestinal tract (GI);  Surgeon: Trent Blood MD;  Location:  OR    ENT SURGERY      ESOPHAGOSCOPY, GASTROSCOPY, DUODENOSCOPY (EGD), COMBINED N/A 02/12/2020    Procedure: ESOPHAGOGASTRODUODENOSCOPY (EGD);  Surgeon: Sam Escobar MD;  Location: [facility] GI    EXTRACORPOREAL SHOCK WAVE LITHOTRIPSY (ESWL) Bilateral 10/18/2017    Procedure: EXTRACORPOREAL SHOCK WAVE LITHOTRIPSY (ESWL);  BILATERAL EXTRACORPOREAL SHOCKWAVE LITHOTRIPSY ;  Surgeon: Meir Torres MD;  Location: [facility] OR    HC CORRECT BUNION,SIMPLE  08/11/2005    x3    HC REMV TOE BENIGN BONE LESN  08/11/2005    HEAD & NECK SURGERY  1954    From a fall    HERNIORRHAPHY INGUINAL  07/03/2013    Procedure: HERNIORRHAPHY INGUINAL;  Open Repair Inguinal hernia Right with mesh ;  Surgeon: Sam Escobar MD;  Location: [facility] OR    IR GASTROSTOMY TUBE PERCUTANEOUS PLCMNT  06/07/2021    LAPAROSCOPIC CHOLECYSTECTOMY N/A 10/18/2023    Procedure: CHOLECYSTECTOMY, LAPAROSCOPIC;  Surgeon: Dannie Warner MD;  Location:  OR    MOHS MICROGRAPHIC PROCEDURE  08/23/2011    ear and chin-[facility]    OPEN REDUCTION INTERNAL FIXATION WRIST Right 07/18/2017    Procedure: OPEN REDUCTION INTERNAL FIXATION WRIST;  Right distal radius open reduction and internal fixation;  Surgeon: Pedro Blanca DO;  Location: [facility] OR    RECONSTRUCT FOREFOOT WITH METATARSOPHALANGEAL (MTP) FUSION  06/10/2014    Procedure: RECONSTRUCT FOREFOOT WITH METATARSOPHALANGEAL (MTP) FUSION;  Surgeon: Ash Gonzalez DPM;  Location: [facility] OR    STENT, CORONARY, DEMI      SURGICAL HISTORY OF -   1999/1974    lt knee    SURGICAL HISTORY OF -   10/2004    lithotripsy    SURGICAL HISTORY OF -   11/2005    angiogram with stent    VASCULAR SURGERY  11/17/2005    Puncture of iliac artery during and angiogram    [facility] LAPAROSCOPY, SURGICAL PROSTATECTOMY, RETROPUBIC RADICAL, W/NERVE SPARING  11/30/2004    With full  bilateral pelvic lymphadenectomy.  F-[facility].    ZZC TOTAL KNEE ARTHROPLASTY  05/01/2008    Left knee       Prior to Admission Medications   Prior to Admission Medications   Prescriptions Last Dose Informant Patient Reported? Taking?   acetaminophen (TYLENOL) 325 MG tablet Unknown Spouse/Significant Other No Yes   Sig: Take 1-2 tablets (325-650 mg) by mouth every 6 hours as needed for mild pain   aspirin 81 MG EC tablet 1/25/2024  Yes Yes   Sig: Take 81 mg by mouth daily   cetirizine (ZYRTEC) 10 MG tablet Unknown Spouse/Significant Other No Yes   Sig: Take 1 tablet (10 mg) by mouth daily   Patient taking differently: Take 10 mg by mouth daily as needed for rhinitis   famotidine (PEPCID) 20 MG tablet 1/26/2024 at 1429 Spouse/Significant Other Yes Yes   Sig: Take 20 mg by mouth 2 times daily as needed (heartburn)   hydrOXYzine HCl (ATARAX) 25 MG tablet 1/26/2024  No Yes   Sig: Take 1-2 tablets (25-50 mg) by mouth 3 times daily as needed for itching   levothyroxine (SYNTHROID/LEVOTHROID) 88 MCG tablet 1/25/2024  No Yes   Sig: Take 1 tablet (88 mcg) by mouth daily   melatonin 5 MG tablet Unknown  Yes Yes   Sig: Take 5 mg by mouth nightly as needed for sleep   metoprolol succinate ER (TOPROL XL) 50 MG 24 hr tablet 1/25/2024 Spouse/Significant Other No Yes   Sig: Take 0.5 tablets (25 mg) by mouth daily   naloxone (NARCAN) 4 MG/0.1ML nasal spray Unknown Spouse/Significant Other No Yes   Sig: Spray 1 spray (4 mg) into one nostril alternating nostrils as needed for opioid reversal every 2-3 minutes until assistance arrives   nitroGLYcerin (NITROSTAT) 0.4 MG sublingual tablet Unknown Spouse/Significant Other No Yes   Sig: For chest pain place 1 tablet under the tongue every 5 minutes for 3 doses. If symptoms persist 5 minutes after 1st dose call 911.   ondansetron (ZOFRAN ODT) 4 MG ODT tab Unknown  No Yes   Sig: DISSOLVE ONE TABLET BY MOUTH EVERY 8 HOURS AS NEEDED FOR NAUSEA   ondansetron (ZOFRAN) 4 MG tablet Unknown  Spouse/Significant Other No Yes   Sig: Take 1 tablet (4 mg) by mouth every 8 hours as needed for nausea   oxyCODONE (ROXICODONE) 5 MG tablet Unknown  No Yes   Sig: TAKE ONE TO TWO TABLETS BY MOUTH EVERY 6 HOURS AS NEEDED FOR SEVERE PAIN - MAX OF 6 TABLETS PER DAY   Patient taking differently: Take 5-10 mg by mouth every 6 hours as needed for severe pain MAX OF 6 TABLETS PER DAY   pregabalin (LYRICA) 150 MG capsule 1/25/2024  No Yes   Sig: TAKE 1 CAPSULE (150 MG) BY MOUTH 2 TIMES DAILY FOR 30 DAYS   rosuvastatin (CRESTOR) 40 MG tablet 1/25/2024  No Yes   Sig: Take 1 tablet (40 mg) by mouth daily   zolpidem (AMBIEN) 10 MG tablet 1/24/2024  No Yes   Sig: TAKE ONE TABLET BY MOUTH EVERY NIGHT AT BEDTIME AS NEEDED FOR SLEEP      Facility-Administered Medications: None     Allergies   Allergies   Allergen Reactions    Levofloxacin Rash     Head to toe    Animal Dander     Augmentin [Amoxicillin-Pot Clavulanate] GI Disturbance    Azithromycin Nausea and Vomiting    Dust Mites     Pollen Extract     Smoke.        Immunization History   Immunization History   Administered Date(s) Administered    COVID-19 12+ (2023-24) (Pfizer) 11/28/2023    COVID-19 Monovalent 18+ (Moderna) 02/05/2021, 03/05/2021, 08/31/2021    COVID-19 Monovalent Booster 18+ (Moderna) 06/02/2022    Flu, Unspecified 11/13/2002    R8c4-24 Novel Flu 01/21/2010    HepB 06/12/2009, 07/13/2009, 01/21/2010    Hepatitis B, Adult 06/12/2009, 07/13/2009, 01/21/2010    Influenza (High Dose) 3 valent vaccine 10/12/2016, 10/09/2017, 10/08/2018, 09/23/2019    Influenza (IIV3) PF 10/20/1995, 10/15/1997, 01/12/2005, 11/11/2005, 11/07/2006, 10/18/2007, 10/14/2010, 09/29/2011, 09/26/2012, 09/24/2014    Influenza Vaccine 65+ (Fluzone HD) 09/21/2020, 09/17/2021, 10/27/2022, 10/30/2023    Influenza Vaccine >6 months,quad, PF 10/03/2013, 10/09/2015    Influenza, seasonal, injectable, PF 12/03/2009    Pneumo Conj 13-V (2010&after) 10/09/2017    Pneumococcal 23 valent 10/12/2016     RSV Vaccine (Abrysvo) 11/29/2023    TD,PF 7+ (Tenivac) 09/17/2006    TDAP Vaccine (Boostrix) 12/13/2016    Zoster recombinant adjuvanted (SHINGRIX) 08/07/2019, 02/18/2020    Zoster vaccine, live 02/03/2016       Social History   I have reviewed this patient's social history and updated it with pertinent information if needed. Quan Murpyh  reports that he has never smoked. He has never used smokeless tobacco. He reports current alcohol use of about 10.0 standard drinks of alcohol per week. He reports that he does not use drugs.    Family History   I have reviewed this patient's family history and updated it with pertinent information if needed.   Family History   Problem Relation Age of Onset    Hypertension Father         Lived to age 87    Connective Tissue Disorder Mother         LUPUS    Heart Disease Mother         Valve replacement    Anxiety Disorder Mother         Lived to age 84    Dementia Mother         Nursing Home (lived to age 86)       Review of Systems   The 10 point Review of Systems is as per HPI    Physical Exam   Temp: 98.1  F (36.7  C) Temp src: Axillary BP: 105/65 Pulse: 102   Resp: 18 SpO2: 99 % O2 Device: None (Room air)    Vital Signs with Ranges  Temp:  [98.1  F (36.7  C)-99.5  F (37.5  C)] 98.1  F (36.7  C)  Pulse:  [] 102  Resp:  [18-20] 18  BP: ()/(50-74) 105/65  SpO2:  [94 %-99 %] 99 %  212 lbs 0 oz  Body mass index is 32.23 kg/m .    GENERAL APPEARANCE:  awake  EYES: Eyes grossly normal to inspection  NECK: no adenopathy  RESP: lungs clear   CV: regular rates and rhythm  ABDOMEN: soft, nontender  MS: LE edema  SKIN: extensive generalized maculopapular drug rash    Data   All laboratory data reviewed  Component      Latest Ref Rng 1/27/2024  7:57 AM   Sodium      135 - 145 mmol/L 128 (L)    Potassium      3.4 - 5.3 mmol/L 4.5    Chloride      98 - 107 mmol/L 102    Carbon Dioxide (CO2)      22 - 29 mmol/L 17 (L)    Anion Gap      7 - 15 mmol/L 9    Urea Nitrogen      8.0  - 23.0 mg/dL 35.9 (H)    Creatinine      0.67 - 1.17 mg/dL 1.95 (H)    GFR Estimate      >60 mL/min/1.73m2 36 (L)    Calcium      8.8 - 10.2 mg/dL 8.0 (L)    Glucose      70 - 99 mg/dL 84      Component      Latest Ref Rn 1/27/2024  7:57 AM   WBC      4.0 - 11.0 10e3/uL 7.0    RBC Count      4.40 - 5.90 10e6/uL 3.80 (L)    Hemoglobin      13.3 - 17.7 g/dL 10.2 (L)    Hematocrit      40.0 - 53.0 % 32.1 (L)    MCV      78 - 100 fL 85    MCH      26.5 - 33.0 pg 26.8    MCHC      31.5 - 36.5 g/dL 31.8    RDW      10.0 - 15.0 % 18.4 (H)    Platelet Count      150 - 450 10e3/uL 149 (L)       Component      Latest Ref Rn 1/27/2024  2:28 AM   Lactic Acid      0.7 - 2.0 mmol/L 1.3      Component      Latest Ref [facility] 1/25/2024  9:14 PM   Influenza A      Negative  Negative    Influenza B      Negative  Negative    Resp Syncytial Virus      Negative  Negative    SARS CoV2 PCR      Negative  Negative      Component      Latest Ref [facility] 1/25/2024  8:51 PM   Lactic Acid      0.7 - 2.0 mmol/L 2.5 (H)    Procalcitonin      <0.50 ng/mL 0.37    CRP Inflammation      <5.00 mg/L 27.70 (H)    Sed Rate      0 - 20 mm/hr 10      Microbiology  1/25 blood cx pending      OSH 12/12/2023  Component 1 mo ago   Bacterial Culture, Aerobic + Susc  Abnormal   ENTEROCOCCUS HIRAE  1+   Resulting Agency DTL   Susceptibility    Organism Antibiotic Method Susceptibility   Enterococcus hirae Linezolid SUSCEPTIBILITY, COLIN (MCG/ML) <=2 mcg/mL: Susceptible   Enterococcus hirae Vancomycin SUSCEPTIBILITY, COLIN (MCG/ML) <=2 mcg/mL: Susceptible   Enterococcus hirae Gent Synergy SUSCEPTIBILITY, COLIN (MCG/ML) <=500 mcg/mL: Susceptible   Enterococcus hirae Penicillin SUSCEPTIBILITY, COLIN (MCG/ML) >8 mcg/mL: Resistant   Enterococcus hirae Daptomycin SUSCEPTIBILITY, BP (MCG/ML) 4 mcg/mL: Intermediate       Imaging  EXAM: XR CHEST 2 VIEWS  LOCATION: [facility]  DATE: 1/25/2024     INDICATION: Fever of undetermined source, rule out  pneumonia  COMPARISON: CT chest 10/29/2023, radiograph chest 04/11/2024                                                                      IMPRESSION: Normal heart size. Mild subsegmental atelectasis at the left base. No evidence for CHF or pneumonia. No pleural effusion or pneumothorax. Moderate mid thoracic scoliosis.      CT ABDOMEN PELVIS W/O & W CONTRAST 1/25/2024 1:43 PM     CLINICAL HISTORY: Liver mass     TECHNIQUE: CT scan of the abdomen and pelvis was performed following  injection of IV contrast. Multiplanar reformats were obtained.  Precontrast CT abdomen. Postcontrast CT abdomen performed during the  arterial and portal venous phases. Portal venous phase scanning of the  pelvis. Dose reduction techniques were used.     CONTRAST: Isovue 370,  95mL     COMPARISON: CT 1/9/2024.     FINDINGS:   LOWER CHEST: Normal.     HEPATOBILIARY: Segment 8 ablation site shows hypoenhancement and is  larger since the prior exam measuring 2.5 x 2 cm, previously 1.6 x 1.2  cm, series 8 image 45. Extending superiorly from this region is an  extracapsular multinodular opacity again noted. In transverse plane  this is approximately 4.0 x 2.6 cm, previously 5.2 x 2.8 cm when  measuring in a similar fashion as on the prior exam series 8 image 34.  Oblique craniocaudal length is 5.1 cm, previously 6.2 cm, series 9  image 38.     Segment 8 complex hypodense nodules are stable in size measuring 1.8  cm and 0.9 cm, series 8 image 32.     Segment 7 slightly larger complex hypodense nodule measuring 1.9 cm,  previously 1.3 cm, series 8 image 47.     Segment 2 0.8 cm nodule is newly seen series 8 image 35. Another tiny  new example is 0.5 cm right inferior liver at segment 5, series 8  image 81.     There are other examples that appears similar to prior.     PANCREAS: Lobulated hypodensity at the pancreas neck is stable  measuring 1.4 cm series 6 image 70. No new pancreas abnormality.     SPLEEN: Normal.     ADRENAL GLANDS:  Normal.     KIDNEYS/BLADDER: Several bilateral intrarenal stones again noted. No  hydronephrosis. These could also represent areas of medullary  calcification. No new renal abnormality. The bladder appears  unremarkable.     BOWEL: No obstruction or acute inflammation.     PELVIC ORGANS: Stable small fat at the left inguinal canal. No new  pelvic mass.     ADDITIONAL FINDINGS: Lymph nodes remain small. No new adenopathy  identified.     MUSCULOSKELETAL: Degenerative changes and scoliotic curvature of the  spine. No aggressive process.                                                                      IMPRESSION:   1.  Multiple nodular observations within the liver. Previously noted  ablation cavity at segment 8 measures larger compared to 1/9/2024.  Conversely, the adjacent soft tissue abutting the hepatic capsule  measures smaller.  2.  Other several hepatic nodular lesions suggesting metastatic  disease again identified. Slightly larger lesions at segment 7, and  newly visualized lesions at segment 2 and segment 5 are noted. As  such, new areas of metastatic disease are possible. There are also  stable lesions.  3.  Stable hypodense lesion at the pancreas neck that may be further  evaluated with pancreas MRI.

## 2024-01-27 NOTE — PROVIDER NOTIFICATION
"MD Notification    Notified Person: MD    Notified Person Name: Luis    Notification Date/Time: 1/26 2205     Notification Interaction: Vocera    Purpose of Notification: FYI, lactic came back 2.4.     Orders Received: \"Pause maintenance fluids and give 1L bolus over 4 hours. Will repeat lactate at 0200.\" 1L LR bolus, 1 L NS bolus, and IV vanco ordered     Comments: LR bolus over 4hr started per MD note   "

## 2024-01-27 NOTE — PHARMACY-VANCOMYCIN DOSING SERVICE
Pharmacy Vancomycin Note  Date of Service 2024  Patient's  1950   73 year old, male    Indication: Abscess  Day of Therapy: 2  Current vancomycin regimen:  1250 mg IV x1  Current vancomycin monitoring method: AUC  Current vancomycin therapeutic monitoring goal: 400-600 mg*h/L    [employer] Prediction of Current Vancomycin Regimen  Regimen: 1250 mg IV every 24 hours.  Start time: 22:23 on 2024  Exposure target: AUC24 (range)400-600 mg/L.hr   AUC24,ss: 575 mg/L.hr  Probability of AUC24 > 400: 96 %  Ctrough,ss: 19 mg/L  Probability of Ctrough,ss > 20: 43 %  Probability of nephrotoxicity (Lodise JAYASHREE ): 16 %      Current estimated CrCl = Estimated Creatinine Clearance: 35.1 mL/min (A) (based on SCr of 2.03 mg/dL (H)).    Creatinine for last 3 days  2024: 12:32 PM Creatinine 1.58 mg/dL;  8:51 PM Creatinine 1.77 mg/dL  2024:  3:29 PM Creatinine 2.05 mg/dL;  9:43 PM Creatinine 2.03 mg/dL    Recent Vancomycin Levels (past 3 days)  2024: 12:32 PM Vancomycin <4.0 ug/mL  2024:  9:43 PM Vancomycin 7.7 ug/mL    Vancomycin IV Administrations (past 72 hours)                     vancomycin (VANCOCIN) 1,250 mg in sodium chloride 0.9 % 250 mL intermittent infusion (mg) 1,250 mg New Bag 24                    Nephrotoxins and other renal medications (From now, onward)      Start     Dose/Rate Route Frequency Ordered Stop    24  vancomycin (VANCOCIN) 1,000 mg in 200 mL dextrose intermittent infusion         1,000 mg  200 mL/hr over 1 Hours Intravenous ONCE 24  vancomycin place goss - receiving intermittent dosing         1 each Does not apply SEE ADMIN INSTRUCTIONS 24            Interpretation of levels and current regimen:  Vancomycin level is reflective of -600  Has serum creatinine changed greater than 50% in last 72 hours: Yes  Urine output:  unable to determine  Renal Function: Worsening    [employer] Prediction of  Planned New Vancomycin Regimen  Regimen: 1000 mg IV every 24 hours.  Start time: 22:23 on 01/26/2024  Exposure target: AUC24 (range)400-600 mg/L.hr   AUC24,ss: 465 mg/L.hr  Probability of AUC24 > 400: 76 %  Ctrough,ss: 15.3 mg/L  Probability of Ctrough,ss > 20: 16 %  Probability of nephrotoxicity (Lodise JAYASHREE 2009): 11 %    Plan:  Give Vancomycin 1000mg IV x1 tonight  Vancomycin monitoring method: AUC  Vancomycin therapeutic monitoring goal: 400-600 mg*h/L  Pharmacy will check vancomycin levels as appropriate in 1-3 Days.  Serum creatinine levels will be ordered a minimum of twice weekly.    Preet Owen RPH\

## 2024-01-27 NOTE — PLAN OF CARE
Orientation: A&Ox4  Activity: ind  Diet/BS Checks: pureed w/ mildly thick liquids  Tele:  n/a  IV Access/Drains: L PIV SL  Pain Management: denies  Abnormal VS/Results: VSS on RA ex. Soft BP and tachy  Bowel/Bladder: cont/ B/B  Skin/Wounds:  full body rash, scattered scabs  Consults: ID  D/C Disposition: pending  Other Info: atarax given 1x, oxy given 1x. pt has urine sample that needs to be collected, cup at bedside

## 2024-01-27 NOTE — PLAN OF CARE
1/26/24, Fever  1/27/24, 7 a to 3 p    Orientation: A&O    Vitals/Tele: Independent    IV Access/drains: 2 Ivs SL    Diet: Regular    Mobility: Independent    GI/: retaining, patient wants to observe    Wound/Skin: Rash, generalized    Consults: [facility], ID    Discharge Plan: Pending clinical improvement      See Flow sheets for assessment

## 2024-01-27 NOTE — PROGRESS NOTES
[facility]    Medicine Progress Note - Hospitalist Service    Date of Admission:  1/26/2024  Date of Service: 01/27/2024    Assessment & Plan        Quan Murphy is a 73 year old male admitted on 1/26/2024.  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    Recent complex medical history with multiple hospitalizations and changes to antibiotic plan as detailed in admission HPI.    Septic shock due to liver abscess due to Enterobacter hirae  Lactic acidosis  *abscess initially noted on imaging in late October but was without infectious symptoms, underwent biopsy (drain placement not possible) with positive culture in December  *has been off antibiotics for liver abscess since 1/21  *developed fever in the 24 hours prior to arrival, on admission febrile (100.4), tachycardic (107), hypotensive (79/56) with leukocytosis (12.5) and lactate 2.5  *pressures normalized with 4L IVF and remain stable  *CT abd/pelvis showing slight increase in size of liver abscess; CXR negative  Lactate 2.4 now normalized .,  Plan:  - continue vanco and ceftriaxone  - ID consulted  - blood cultures not drawn in ED, will obtain if spikes fever again    SURESH on CKD stage III  *baseline Cr 0.8-1.0; admission Cr 1.58 but trending up on repeat to 2.0, likely due to sepsis  - Improving, but will hold off IVF today due to significant edema    Hyponatremia  *admission Na 131 -> 128 with IVF  Plan:  - hold further IVF  - BMP in AM    Suspected drug rash due to levofloxacin  *developed after starting levofloxacin, evaluated by Derm and felt to be drug rash - now slowly improving off levo per patient  - continue PTA atarax  - monitor  - Benadryl as needed    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  *previously  "undergone chemo and radiation  *alk phos and AST mildly elevated at admission, stable from prior  - anticipate any treatment will be on hold pending resolution of infection    Recent cholangitis s/p ERCP with sphincterotomy and stent placement 10/16/23, stent exchange 1/15/24  - follow up outpatient    CAD s/p PCI to LAD >20 years ago  HTN  - hold PTA metoprolol as hypotensive earlier in day  - continue PTA aspirin    Hypothyroid  - continue PTA levothyroxine    Prostate cancer s/p prostatectomy, in remission  Noted          Diet: Regular Diet Adult    DVT Prophylaxis: Pneumatic Compression Devices  Stone Catheter: Not present  Lines: None     Cardiac Monitoring: None  Code Status: Full Code      Clinically Significant Risk Factors Present on Admission         # Hyponatremia: Lowest Na = 128 mmol/L in last 2 days, will monitor as appropriate      # Hypoalbuminemia: Lowest albumin = 2.9 g/dL at 1/25/2024  8:51 PM, will monitor as appropriate   # Drug Induced Platelet Defect: home medication list includes an antiplatelet medication  # Acute Kidney Injury, unspecified: based on a >150% or 0.3 mg/dL increase in last creatinine compared to past 90 day average, will monitor renal function  # Hypertension: Noted on problem list      # Obesity: Estimated body mass index is 32.23 kg/m  as calculated from the following:    Height as of 1/25/24: 1.727 m (5' 8\").    Weight as of this encounter: 96.2 kg (212 lb).       # Financial/Environmental Concerns:           Disposition Plan      Expected Discharge Date: 01/29/2024                    Kevin Kiran MD  Hospitalist Service  [facility]  Securely message with [facility] (more info)  Text page via [employer] Paging/Directory   ______________________________________________________________________    Interval History     Reports itchy rash  No fevers  No nausea / vomiting or abdominal pain today  No CP/SOB  Reports worsening edema of LEs  Some urinary retention " noted  No new complaints.     Physical Exam   Vital Signs: Temp: 98.1  F (36.7  C) Temp src: Axillary BP: 105/65 Pulse: 102   Resp: 18 SpO2: 99 % O2 Device: None (Room air)    Weight: 212 lbs 0 oz    General Appearance: well nourished male in NAD  Respiratory: lungs CTAB, no wheezes or crackles  Cardiovascular: RRR, normal s1/s2 without murmur  GI: abdomen soft, normal bowel sounds, nontender  Lymph/Hematologic: mild LE edema   Skin: diffuse blanchable erythematous rash over trunk and extremities  Musculoskeletal: extremities warm and well perfused   Neurologic: alert and appropriate, CN grossly intact   Psychiatric: normal affect   ----------------------------------------------------------------------------------------    Medical Decision Making       ------------------ MEDICAL DECISION MAKING ------------------------------------------------------------------------------------------------------  High Complexity Decision Making       Data   ------------------------- PAST 24 HR DATA REVIEWED -----------------------------------------------    I have personally reviewed the following data over the past 24 hrs:    7.0  \   10.2 (L)   / 149 (L)     128 (L) 102 35.9 (H) /  84   4.5 17 (L) 1.95 (H) \     Procal: N/A CRP: N/A Lactic Acid: 1.3         Imaging results reviewed over the past 24 hrs:   No results found for this or any previous visit (from the past 24 hour(s)).  ------------------------- ENCOUNTER LABS ----------------------------------------------------------------  Recent Labs   Lab 01/27/24  0757 01/26/24  2143 01/26/24  1529 01/25/24  2051 01/25/24  1232 01/25/24  1232   WBC 7.0  --  9.9 9.7  --  12.4*   HGB 10.2*  --  11.0* 12.0*  --  12.5*   MCV 85  --  86 84  --  85   *  --  161 180  --  197   * 132* 131* 130*   < >  --    POTASSIUM 4.5 4.9 4.6 4.3   < >  --    CHLORIDE 102 104 104 99   < >  --    CO2 17* 16* 17* 16*   < >  --    BUN 35.9* 34.1* 31.2* 24.6*   < >  --    CR 1.95* 2.03* 2.05*  1.77*  --  1.58*   ANIONGAP 9 12 10 15   < >  --    LATRELL 8.0* 7.8* 7.7* 8.2*   < >  --    GLC 84 95 100* 96   < >  --    ALBUMIN  --   --   --  2.9*  --   --    PROTTOTAL  --   --   --  5.7*  --   --    BILITOTAL  --   --   --  0.5  --   --    ALKPHOS  --   --   --  257*  --  283*   ALT  --   --   --  64  --  66   AST  --   --   --  62*  --   --    LIPASE  --   --   --  11*  --   --     < > = values in this interval not displayed.       Most Recent 3 CBC's:  Recent Labs   Lab Test 01/27/24 0757 01/26/24  1529 01/25/24 2051   WBC 7.0 9.9 9.7   HGB 10.2* 11.0* 12.0*   MCV 85 86 84   * 161 180     Most Recent 3 BMP's:  Recent Labs   Lab Test 01/27/24  0757 01/26/24  2143 01/26/24  1529   * 132* 131*   POTASSIUM 4.5 4.9 4.6   CHLORIDE 102 104 104   CO2 17* 16* 17*   BUN 35.9* 34.1* 31.2*   CR 1.95* 2.03* 2.05*   ANIONGAP 9 12 10   LATRELL 8.0* 7.8* 7.7*   GLC 84 95 100*     Most Recent 2 LFT's:  Recent Labs   Lab Test 01/25/24 2051 01/25/24  1232 12/21/23  0919 11/28/23  1517   AST 62*  --   --  52*   ALT 64 66   < > 48   ALKPHOS 257* 283*   < > 305*   BILITOTAL 0.5  --   --  0.3    < > = values in this interval not displayed.     Most Recent 3 INR's:  Recent Labs   Lab Test 10/15/23  0504 10/07/23  0953 09/27/23  1155   INR 1.14 1.28* 1.29*     Most Recent 3 Troponin's:  Recent Labs   Lab Test 06/04/21  0624 12/24/16  1020 12/24/16  0325   TROPI 0.035 <0.015  The 99th percentile for upper reference range is 0.045 ug/L.  Troponin values in   the range of 0.045 - 0.120 ug/L may be associated with risks of adverse   clinical events.   <0.015  The 99th percentile for upper reference range is 0.045 ug/L.  Troponin values in   the range of 0.045 - 0.120 ug/L may be associated with risks of adverse   clinical events.       Most Recent 3 BNP's:  Recent Labs   Lab Test 10/29/23  0528   NTBNPI 683     Most Recent D-dimer:  Recent Labs   Lab Test 12/23/16  2130   DD 0.3     Most Recent 6 Bacteria Isolates From Any  Culture (See EPIC Reports for Culture Details):  Recent Labs   Lab Test 06/04/21  1104 06/04/21  0744 05/27/21  1031 05/27/21  1015 05/27/21  0950 05/12/21  2304   CULT Moderate growth  Normal manuel    Light growth  beta hemolytic   Streptococcus anginosus  This organism is susceptible to ampicillin, penicillin, vancomycin and the cephalosporins.   If treatment is required AND your patient is allergic to penicillin, contact the   Microbiology Lab within 5 days to request susceptibility testing.  * No growth No growth No growth No growth No growth     Most Recent TSH and T4:  Recent Labs   Lab Test 01/02/24  1418   TSH 0.03*   T4 1.21     Most Recent 6 glucoses:  Recent Labs   Lab Test 01/27/24  0757 01/26/24  2143 01/26/24  1529 01/25/24  2051 11/28/23  1517 11/07/23  1454   GLC 84 95 100* 96 93 95     Most Recent Urinalysis:  Recent Labs   Lab Test 01/27/24  0643 10/28/17  1500 01/18/16  1457   COLOR Yellow   < > Yellow   APPEARANCE Clear   < > Clear   URINEGLC Negative   < > Negative   URINEBILI Negative   < > Negative   URINEKETONE Negative   < > Negative   SG 1.023   < > 1.015   UBLD Negative   < > Negative   URINEPH 6.5   < > 7.0   PROTEIN 30*   < > Negative   UROBILINOGEN  --   --  0.2   NITRITE Negative   < > Negative   LEUKEST Negative   < > Negative   RBCU <1   < >  --    WBCU 1   < >  --     < > = values in this interval not displayed.     Most Recent ESR & CRP:  Recent Labs   Lab Test 01/25/24  2051 10/07/23  2056 12/09/22  0901   SED 10  --   --    CRP  --   --  13.8*   CRPI 27.70*   < >  --     < > = values in this interval not displayed.

## 2024-01-27 NOTE — PLAN OF CARE
[id number]  Orientation: A&Ox4    Activity: Independent in room     Diet/BS Checks: Regular     Tele: N/A    IV Access/Drains: RPIV CDI SL     Pain Management: Prn 10 mg oxy given x1     Abnormal VS/Results: Tachycardic at times, afebrile this shift. Vanco draw at 2200.    Bowel/Bladder: Continent of both, retaining urine. Last bladder scan was 443 mL after urinating, pt declined straight cath. Pt given frequent encouragement to urinate.     Skin/Wounds: Rash all over body with scratches.     Consults: ID    D/C Disposition: Pending     Other Info: Pt itching rash

## 2024-01-27 NOTE — PHARMACY-VANCOMYCIN DOSING SERVICE
Pharmacy Vancomycin Initial Note  Date of Service 2024  Patient's  1950  73 year old, male    Indication: Abscess    Current estimated CrCl = Estimated Creatinine Clearance: 34.8 mL/min (A) (based on SCr of 2.05 mg/dL (H)).    Creatinine for last 3 days  2024: 12:32 PM Creatinine 1.58 mg/dL;  8:51 PM Creatinine 1.77 mg/dL  2024:  3:29 PM Creatinine 2.05 mg/dL    Recent Vancomycin Level(s) for last 3 days  2024: 12:32 PM Vancomycin <4.0 ug/mL      Vancomycin IV Administrations (past 72 hours)                     vancomycin (VANCOCIN) 1,250 mg in sodium chloride 0.9 % 250 mL intermittent infusion (mg) 1,250 mg New Bag 24                    Nephrotoxins and other renal medications (From now, onward)      Start     Dose/Rate Route Frequency Ordered Stop    24  vancomycin place goss - receiving intermittent dosing         1 each Does not apply SEE ADMIN INSTRUCTIONS 24              [employer] Prediction of Planned Initial Vancomycin Regimen  Regimen: 1250 mg IV every 24 hours.  Start time: 20:25 on 2024  Exposure target: AUC24 (range)400-600 mg/L.hr   AUC24,ss: 627 mg/L.hr  Probability of AUC24 > 400: 90 %  Ctrough,ss: 20.8 mg/L  Probability of Ctrough,ss > 20: 54 %  Probability of nephrotoxicity (Lodise JAYASHREE ): 19 %          Plan:  Start vancomycin  intermittent based on levels due to SURESH. Pt got 1250mg IV yesterday  Vancomycin monitoring method: AUC  Vancomycin therapeutic monitoring goal: 400-600 mg*h/L  Pharmacy will check vancomycin levels as appropriate in  2100 .    Serum creatinine levels will be ordered a minimum of twice weekly.      Preet Owen RPH

## 2024-01-28 NOTE — PROGRESS NOTES
[facility]    Infectious Disease Progress Note    Date of Service : 01/28/2024     Assessment:  73YM with oropharynx squamous cell carcinoma status post chemoradiation/chemotherapy and ablation of liver lesion in 09/2023 who was found to have hepatic abscess with Enterococcus   hirae at the ablation site s/p biopsy which showed organizing abscess with granulation tissue, negative for malignancy, treated with Ceftriaxone/Vancomycin and transitioned to oral Levaquin with course complicated by a severe drug rash.      -Severe generalized drug rash with fever and SIRs related to Levaquin with SURESH. Has been off antibiotics since 1/21  -SURESH superimposed on CKD improved  -Hyponatremia  -Pyogenic liver abscess related to Enterococcus hirae following ablation of metastatic liver lesion. 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 Dose increased to 1250 mg daily        Bharti Gore MD    Interval History   Remains very uncomfortable due to drug rash which Is very exuberant. Has remained afebrile, tolerating vancomycin without additional side effects    Physical Exam   Temp: 97.6  F (36.4  C) Temp src: Oral BP: 103/55 Pulse: 102   Resp: 20 SpO2: 94 % O2 Device: None (Room air)    Vitals:    01/27/24 0417 01/28/24 0016   Weight: 96.2 kg (212 lb) 96.2 kg (212 lb)     Vital Signs with Ranges  Temp:  [97.2  F (36.2  C)-98.1  F (36.7  C)] 97.6  F (36.4  C)  Pulse:  []  102  Resp:  [18-20] 20  BP: ()/(49-65) 103/55  SpO2:  [92 %-98 %] 94 %    GENERAL APPEARANCE:  awake  EYES: Eyes grossly normal to inspection  NECK: no adenopathy  RESP: lungs clear   CV: regular rates and rhythm  ABDOMEN: soft, nontender  MS: LE edema  SKIN: extensive generalized maculopapular drug rash    Other:    Medications      aspirin  81 mg Oral Daily    heparin ANTICOAGULANT  5,000 Units Subcutaneous Q8H    levothyroxine  88 mcg Oral QAM AC    pramoxine   Rectal TID    pregabalin  150 mg Oral BID    rosuvastatin  40 mg Oral Daily    sodium chloride (PF)  3 mL Intracatheter Q8H    tamsulosin  0.4 mg Oral Daily    vancomycin place goss - receiving intermittent dosing  1 each Does not apply See Admin Instructions       Data   All microbiology laboratory data reviewed.  Recent Labs   Lab Test 01/27/24  0757 01/26/24  1529 01/25/24 2051   WBC 7.0 9.9 9.7   HGB 10.2* 11.0* 12.0*   HCT 32.1* 35.2* 37.4*   MCV 85 86 84   * 161 180     Recent Labs   Lab Test 01/27/24  0757 01/26/24  2143 01/26/24  1529   CR 1.95* 2.03* 2.05*     Recent Labs   Lab Test 01/25/24 2051   SED 10     Component      Latest Ref Rng 1/27/2024  10:38 PM   Vancomycin        ug/mL 10.0

## 2024-01-28 NOTE — PLAN OF CARE
Orientation: A&Ox4  Activity: ind  Diet/BS Checks: regular  Tele:  n/a  IV Access/Drains: L PIV SL  Pain Management: denies  Abnormal VS/Results: VSS on RA ex. Soft BP and tachy  Bowel/Bladder: cont/ B/B  Skin/Wounds:  full body rash, scattered scabs  Consults: ID  D/C Disposition: pending  Other Info: , oxy given 1x, bladder scan showed 509, pt refused straight cath

## 2024-01-28 NOTE — PHARMACY-VANCOMYCIN DOSING SERVICE
Pharmacy Vancomycin Note  Date of Service 2024  Patient's  1950   73 year old, male    Indication: Abscess  Day of Therapy: 3  Current vancomycin regimen: Intermittent dosing  Current vancomycin monitoring method: Renal Replacement Therapy  Current vancomycin therapeutic monitoring goal:  per levels      Current estimated CrCl = Estimated Creatinine Clearance: 38.5 mL/min (A) (based on SCr of 1.95 mg/dL (H)).    Creatinine for last 3 days  2024: 12:32 PM Creatinine 1.58 mg/dL;  8:51 PM Creatinine 1.77 mg/dL  2024:  3:29 PM Creatinine 2.05 mg/dL;  9:43 PM Creatinine 2.03 mg/dL  2024:  7:57 AM Creatinine 1.95 mg/dL    Recent Vancomycin Levels (past 3 days)  2024: 12:32 PM Vancomycin <4.0 ug/mL  2024:  9:43 PM Vancomycin 7.7 ug/mL  2024: 10:38 PM Vancomycin 10.0 ug/mL    Vancomycin IV Administrations (past 72 hours)                     vancomycin (VANCOCIN) 1,000 mg in 200 mL dextrose intermittent infusion (mg) 1,000 mg New Bag 24 2325    vancomycin (VANCOCIN) 1,250 mg in sodium chloride 0.9 % 250 mL intermittent infusion (mg) 1,250 mg New Bag 24 2218                    Nephrotoxins and other renal medications (From now, onward)      Start     Dose/Rate Route Frequency Ordered Stop    24 0000  vancomycin (VANCOCIN) 1,250 mg in 0.9% NaCl 250 mL intermittent infusion         1,250 mg  over 90 Minutes Intravenous ONCE 24 2335      24  vancomycin place goss - receiving intermittent dosing         1 each Does not apply SEE ADMIN INSTRUCTIONS 24                 Contrast Orders - past 72 hours (72h ago, onward)      None            Interpretation of levels and current regimen:  Vancomycin level is reflective of therapeutic level    Has serum creatinine changed greater than 50% in last 72 hours: No    Urine output:  unable to determine    Renal Function: Improving    Plan:  Vancomycin 1250 mg IV x 1  Vancomycin monitoring method:   intermittent dosing  Vancomycin therapeutic monitoring goal: 400-600 mg*h/L  Pharmacy will check vancomycin levels as appropriate in 1-3 Days.  Serum creatinine levels will be ordered daily for the first week of therapy and at least twice weekly for subsequent weeks.    Louann Felipe, PharmD, BCPS

## 2024-01-28 NOTE — PROGRESS NOTES
1900 - 2330    Orientation: A&Ox4     Activity: Independent in room   Diet/BS Checks: Regular   Tele: N/A  IV Access/Drains: RPIV CDI SL   Pain Management: Prn 10 mg oxy given x1   Abnormal VS/Results: Tachycardic at times, afebrile this shift. Vanco draw at 2200.  Bowel/Bladder: Continent of both, retaining urine. Last bladder scan was 323 mL after urinating, pt declined straight cath. Pt given frequent encouragement to urinate.   Skin/Wounds: Rash all over body with scratches.   Consults: ID  D/C Disposition: Pending   Other Info: Pt itching rash

## 2024-01-28 NOTE — PROGRESS NOTES
1/28/24; [id number]    A&O X4; Ind in room; pain managed w/ PRN Tylenol and Oxy; pt is retaining urine; Stone attempts X2 were unsuccessful so Urology has been consulted; pt has been hypotensive, at times and MD is aware; ID following; plan for discharge when medically cleared.

## 2024-01-28 NOTE — PROVIDER NOTIFICATION
"MD Notification    Notified Person: MD    Notified Person Name: Qiana    Notification Date/Time: 1/28/24; 1153    Notification Interaction: vocera    Purpose of Notification: I had two failed attempts at straight cath w/ 740mL in bladder; used straight tip and then a coude;  He is unable to void and is on Flomax. Do you have any orders?     Orders Received: MD will order Urology consult    Comments:  Added to communication \" he is also hypotensive; rebounds to a soft BP when at rest, in bed\"  "

## 2024-01-28 NOTE — PROGRESS NOTES
[facility]    Medicine Progress Note - Hospitalist Service    Date of Admission:  1/26/2024  Date of Service: 01/28/2024    Assessment & Plan        Quan Murphy is a 73 year old male admitted on 1/26/2024.  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    Recent complex medical history with multiple hospitalizations and changes to antibiotic plan as detailed in admission HPI.    Septic shock due to liver abscess due to Enterobacter hirae  Lactic acidosis  *abscess initially noted on imaging in late October but was without infectious symptoms, underwent biopsy (drain placement not possible) with positive culture in December  *has been off antibiotics for liver abscess since 1/21  *developed fever in the 24 hours prior to arrival, on admission febrile (100.4), tachycardic (107), hypotensive (79/56) with leukocytosis (12.5) and lactate 2.5  *pressures normalized with 4L IVF and remain stable  *CT abd/pelvis showing slight increase in size of liver abscess; CXR negative  Lactate 2.4 now normalized .,  Plan:  - continue vanco   - ID following  - blood cultures not drawn in ED, will obtain if spikes fever again    SURESH on CKD stage III  *baseline Cr 0.8-1.0; admission Cr 1.58 but trending up on repeat to 2.0, likely due to sepsis  - Improving, but will hold off IVF today due to significant edema    Hyponatremia  *admission Na 131 -> 128 with IVF  Plan:  - hold further IVF  - BMP in AM    Suspected drug rash due to levofloxacin  *developed after starting levofloxacin, evaluated by Derm and felt to be drug rash - now slowly improving off levo per patient  - continue PTA atarax  - monitor  - Benadryl as needed  - Prednisone added    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  *previously undergone chemo and radiation  *alk phos and AST mildly elevated at admission, stable from prior  - anticipate any treatment will be on hold pending resolution of infection    Recent cholangitis s/p ERCP with sphincterotomy and stent placement 10/16/23, stent exchange 1/15/24  - follow up outpatient    CAD s/p PCI to LAD >20 years ago  HTN  - hold PTA metoprolol as hypotensive earlier in day  - continue PTA aspirin    Hypothyroid  - continue PTA levothyroxine    Prostate cancer s/p prostatectomy, in remission  Noted          Diet: Regular Diet Adult    DVT Prophylaxis: Pneumatic Compression Devices  Stone Catheter: Not present  Lines: None     Cardiac Monitoring: None  Code Status: Full Code      Clinically Significant Risk Factors         # Hyponatremia: Lowest Na = 128 mmol/L in last 2 days, will monitor as appropriate      # Hypoalbuminemia: Lowest albumin = 2.9 g/dL at 1/25/2024  8:51 PM, will monitor as appropriate      # Acute Kidney Injury, unspecified: based on a >150% or 0.3 mg/dL increase in last creatinine compared to past 90 day average, will monitor renal function  # Hypertension: Noted on problem list        # Obesity: Estimated body mass index is 32.23 kg/m  as calculated from the following:    Height as of 1/25/24: 1.727 m (5' 8\").    Weight as of this encounter: 96.2 kg (212 lb)., PRESENT ON ADMISSION       # Financial/Environmental Concerns:           Disposition Plan      Expected Discharge Date: 01/30/2024                    Kevin Kiran MD  Hospitalist Service  [facility]  Securely message with [employer] (more info)  Text page via [employer] Paging/Directory   ______________________________________________________________________    Interval History     Reports ongoing diffuse and itchy rash  No fevers  No nausea / vomiting or abdominal pain today  No CP/SOB  Still with significant urinary retention   No new complaints.     Physical Exam   Vital Signs: Temp: " 97.6  F (36.4  C) Temp src: Oral BP: 124/63 Pulse: 102   Resp: 20 SpO2: 94 % O2 Device: None (Room air)    Weight: 212 lbs 0 oz    General Appearance: well nourished male in NAD  Respiratory: lungs CTAB, no wheezes or crackles  Cardiovascular: RRR, normal s1/s2 without murmur  GI: abdomen soft, normal bowel sounds, nontender  Lymph/Hematologic: mild LE edema   Skin: diffuse blanchable erythematous rash over trunk and extremities  Musculoskeletal: extremities warm and well perfused   Neurologic: alert and appropriate, CN grossly intact   Psychiatric: normal affect   ----------------------------------------------------------------------------------------    Medical Decision Making       ------------------ MEDICAL DECISION MAKING ------------------------------------------------------------------------------------------------------  High Complexity Decision Making       Data   ------------------------- PAST 24 HR DATA REVIEWED -----------------------------------------------        Imaging results reviewed over the past 24 hrs:   No results found for this or any previous visit (from the past 24 hour(s)).  ------------------------- ENCOUNTER LABS ----------------------------------------------------------------  Recent Labs   Lab 01/27/24  0757 01/26/24  2143 01/26/24  1529 01/25/24  2051 01/25/24  1232 01/25/24  1232   WBC 7.0  --  9.9 9.7  --  12.4*   HGB 10.2*  --  11.0* 12.0*  --  12.5*   MCV 85  --  86 84  --  85   *  --  161 180  --  197   * 132* 131* 130*   < >  --    POTASSIUM 4.5 4.9 4.6 4.3   < >  --    CHLORIDE 102 104 104 99   < >  --    CO2 17* 16* 17* 16*   < >  --    BUN 35.9* 34.1* 31.2* 24.6*   < >  --    CR 1.95* 2.03* 2.05* 1.77*  --  1.58*   ANIONGAP 9 12 10 15   < >  --    LATRELL 8.0* 7.8* 7.7* 8.2*   < >  --    GLC 84 95 100* 96   < >  --    ALBUMIN  --   --   --  2.9*  --   --    PROTTOTAL  --   --   --  5.7*  --   --    BILITOTAL  --   --   --  0.5  --   --    ALKPHOS  --   --   --  257*   --  283*   ALT  --   --   --  64  --  66   AST  --   --   --  62*  --   --    LIPASE  --   --   --  11*  --   --     < > = values in this interval not displayed.       Most Recent 3 CBC's:  Recent Labs   Lab Test 01/27/24  0757 01/26/24  1529 01/25/24 2051   WBC 7.0 9.9 9.7   HGB 10.2* 11.0* 12.0*   MCV 85 86 84   * 161 180     Most Recent 3 BMP's:  Recent Labs   Lab Test 01/27/24  0757 01/26/24  2143 01/26/24  1529   * 132* 131*   POTASSIUM 4.5 4.9 4.6   CHLORIDE 102 104 104   CO2 17* 16* 17*   BUN 35.9* 34.1* 31.2*   CR 1.95* 2.03* 2.05*   ANIONGAP 9 12 10   LATRELL 8.0* 7.8* 7.7*   GLC 84 95 100*     Most Recent 2 LFT's:  Recent Labs   Lab Test 01/25/24 2051 01/25/24  1232 12/21/23  0919 11/28/23  1517   AST 62*  --   --  52*   ALT 64 66   < > 48   ALKPHOS 257* 283*   < > 305*   BILITOTAL 0.5  --   --  0.3    < > = values in this interval not displayed.     Most Recent 3 INR's:  Recent Labs   Lab Test 10/15/23  0504 10/07/23  0953 09/27/23  1155   INR 1.14 1.28* 1.29*     Most Recent 3 Troponin's:  Recent Labs   Lab Test 06/04/21  0624 12/24/16  1020 12/24/16  0325   TROPI 0.035 <0.015  The 99th percentile for upper reference range is 0.045 ug/L.  Troponin values in   the range of 0.045 - 0.120 ug/L may be associated with risks of adverse   clinical events.   <0.015  The 99th percentile for upper reference range is 0.045 ug/L.  Troponin values in   the range of 0.045 - 0.120 ug/L may be associated with risks of adverse   clinical events.       Most Recent 3 BNP's:  Recent Labs   Lab Test 10/29/23  0528   NTBNPI 683     Most Recent D-dimer:  Recent Labs   Lab Test 12/23/16  2130   DD 0.3     Most Recent 6 Bacteria Isolates From Any Culture (See EPIC Reports for Culture Details):  Recent Labs   Lab Test 06/04/21  1104 06/04/21  0744 05/27/21  1031 05/27/21  1015 05/27/21  0950 05/12/21  2304   CULT Moderate growth  Normal manuel    Light growth  beta hemolytic   Streptococcus anginosus  This organism is  susceptible to ampicillin, penicillin, vancomycin and the cephalosporins.   If treatment is required AND your patient is allergic to penicillin, contact the   Microbiology Lab within 5 days to request susceptibility testing.  * No growth No growth No growth No growth No growth     Most Recent TSH and T4:  Recent Labs   Lab Test 01/02/24  1418   TSH 0.03*   T4 1.21     Most Recent 6 glucoses:  Recent Labs   Lab Test 01/27/24  0757 01/26/24  2143 01/26/24  1529 01/25/24  2051 11/28/23  1517 11/07/23  1454   GLC 84 95 100* 96 93 95     Most Recent Urinalysis:  Recent Labs   Lab Test 01/27/24  0643 10/28/17  1500 01/18/16  1457   COLOR Yellow   < > Yellow   APPEARANCE Clear   < > Clear   URINEGLC Negative   < > Negative   URINEBILI Negative   < > Negative   URINEKETONE Negative   < > Negative   SG 1.023   < > 1.015   UBLD Negative   < > Negative   URINEPH 6.5   < > 7.0   PROTEIN 30*   < > Negative   UROBILINOGEN  --   --  0.2   NITRITE Negative   < > Negative   LEUKEST Negative   < > Negative   RBCU <1   < >  --    WBCU 1   < >  --     < > = values in this interval not displayed.     Most Recent ESR & CRP:  Recent Labs   Lab Test 01/25/24  2051 10/07/23  2056 12/09/22  0901   SED 10  --   --    CRP  --   --  13.8*   CRPI 27.70*   < >  --     < > = values in this interval not displayed.

## 2024-01-28 NOTE — CONSULTS
Urology consult note    I was called for difficult Stone catheter placement on 73-year-old gentleman who is admitted for sepsis from liver abscess.  He has a history of a radical prostatectomy many years ago.  He sees Dr. Rogers at urology clinic in [place].  He now has no urinary symptoms or complaints.  He has not voided since yesterday evening.  Nurses were unable to place catheter at the bedside so I was contacted.    I prepped and draped the penis in the standard sterile fashion.  I placed a 14 Yoruba coudé tip Stone catheter without difficulty. 950mL of clear urine drained from the bladder.    A/P: Leave in Stone catheter during hospitalization  Due to the degree of bladder distention, I recommend that the catheter remain in place for 2 weeks before removal and trial of void.  He will plan to follow-up with Dr. Rogers in [place] urology clinic after discharge.

## 2024-01-29 NOTE — PROGRESS NOTES
MD Notification    Notified Person: MD    Notified Person Name:Fortino Deng    Notification Date/Time: 1/29 0010    Notification Interaction: vocera    Purpose of Notification:     Pt temp 95.8 axillary, not able to get oral temp. Pt refuses rectal temp. any further suggestions? pt has warm blankets on and warmed up his room. Pt other vitals are stable.       Orders Received: check BG and get bear hugger    Comments:  pt

## 2024-01-29 NOTE — PROGRESS NOTES
"Orientation: A&Ox4  Activity: independent  Diet/BS Checks: regular  Tele:  N/A  IV Access/Drains: right forearm piv, intermittent abx  Pain Management: reports pain \"all over\", given prn oxycodone x2 and prn tylenol x1  Abnormal VS/Results: tachycardia at times. Hgb 8.6.   Bowel/Bladder: mcdermott in place for rentention, TOV in two weeks  Skin/Wounds: full body rash, abrasions to BLE from scratching   Consults: ID, urology   D/C Disposition: pending plan for abx - ID consulting with Arnold  Other Info: Itchy rash all over body. Reports chills, afebrile. Per ID note, likely PICC placement tomorrow for IV vancomycin at discharge. Ambulated unit x2  "

## 2024-01-29 NOTE — PROGRESS NOTES
[facility]    Medicine Progress Note - Hospitalist Service    Date of Admission:  1/26/2024  Date of Service: 01/29/2024    Assessment & Plan        Quan Murphy is a 73 year old male admitted on 1/26/2024.  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    Recent complex medical history with multiple hospitalizations and changes to antibiotic plan as detailed in admission HPI.    Septic shock due to liver abscess due to Enterobacter hirae  Lactic acidosis  *abscess initially noted on imaging in late October but was without infectious symptoms, underwent biopsy (drain placement not possible) with positive culture in December  *has been off antibiotics for liver abscess since 1/21  *developed fever in the 24 hours prior to arrival, on admission febrile (100.4), tachycardic (107), hypotensive (79/56) with leukocytosis (12.5) and lactate 2.5  *pressures normalized with 4L IVF and remain stable  *CT abd/pelvis showing slight increase in size of liver abscess; CXR negative  Lactate 2.4 now normalized .,  Plan:  - continue vanco   - ID following -> Likely PICC tomorrow for IV Vancomycin at discharge   - blood cultures not drawn in ED, will obtain if spikes fever again    SURESH on CKD stage III  *baseline Cr 0.8-1.0; admission Cr 1.58 but trending up on repeat to 2.0, likely due to sepsis  - Improving, but will hold off IVF today due to significant edema  - Cr improving, 1.68 today    Hyponatremia  *admission Na 131 -> 128 with IVF  Plan:  - hold further IVF  - BMP in AM -> Na now normalized    Suspected drug rash due to levofloxacin  *developed after starting levofloxacin, evaluated by Derm and felt to be drug rash - now slowly improving off levo per patient  - continue PTA atarax  -  "monitor  - Benadryl as needed  - Prednisone added, will continue as rash now improving    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  *previously undergone chemo and radiation  *alk phos and AST mildly elevated at admission, stable from prior  - anticipate any treatment will be on hold pending resolution of infection    Recent cholangitis s/p ERCP with sphincterotomy and stent placement 10/16/23, stent exchange 1/15/24  - follow up outpatient    CAD s/p PCI to LAD >20 years ago  HTN  - hold PTA metoprolol as hypotensive earlier in day  - continue PTA aspirin    Hypothyroid  - continue PTA levothyroxine    Prostate cancer s/p prostatectomy, in remission  Urinary Retention  Noted  Urology consulted for retention -> Due to the degree of bladder distention, I recommend that the catheter remain in place for 2 weeks before removal and trial of void.           Diet: Regular Diet Adult    DVT Prophylaxis: Pneumatic Compression Devices  Stone Catheter: PRESENT, indication: Retention  Lines: None     Cardiac Monitoring: None  Code Status: Full Code      Clinically Significant Risk Factors              # Hypoalbuminemia: Lowest albumin = 2.9 g/dL at 1/25/2024  8:51 PM, will monitor as appropriate      # Acute Kidney Injury, unspecified: based on a >150% or 0.3 mg/dL increase in last creatinine compared to past 90 day average, will monitor renal function  # Hypertension: Noted on problem list        # Obesity: Estimated body mass index is 31.64 kg/m  as calculated from the following:    Height as of 1/25/24: 1.727 m (5' 8\").    Weight as of this encounter: 94.4 kg (208 lb 1.6 oz)., PRESENT ON ADMISSION       # Financial/Environmental Concerns: none         Disposition Plan      Expected Discharge Date: 01/31/2024                    Kevin Kiran MD  Hospitalist Service  [facility]  Securely message with [employer] (more info)  Text page via [facility] Paging/Directory "   ______________________________________________________________________    Interval History     No acute events overnight  Reports ongoing diffuse and itchy rash but improving overall  No fevers  No nausea / vomiting or abdominal pain today  No CP/SOB  Tolerating mcdermott  No new complaints.     Physical Exam   Vital Signs: Temp: 98  F (36.7  C) Temp src: Oral BP: 123/61 Pulse: 86   Resp: 18 SpO2: 98 % O2 Device: None (Room air)    Weight: 208 lbs 1.6 oz    General Appearance: well nourished male in NAD  Respiratory: lungs CTAB, no wheezes or crackles  Cardiovascular: RRR, normal s1/s2 without murmur  GI: abdomen soft, normal bowel sounds, nont-connor  : mcdermott catheter present.   Lymph/Hematologic: mild LE edema bilaterally  Skin: diffuse blanchable erythematous rash over trunk and extremities  Musculoskeletal: extremities warm and well perfused   Neurologic: alert and appropriate, Kendra.  Psychiatric: normal affect   ----------------------------------------------------------------------------------------    Medical Decision Making       ------------------ MEDICAL DECISION MAKING ------------------------------------------------------------------------------------------------------  High Complexity Decision Making       Data   ------------------------- PAST 24 HR DATA REVIEWED -----------------------------------------------    I have personally reviewed the following data over the past 24 hrs:    5.3  \   8.6 (L)   / 145 (L)     135 108 (H) 38.7 (H) /  92   4.3 15 (L) 1.68 (H) \     Procal: N/A CRP: 50.52 (H) Lactic Acid: 1.0         Imaging results reviewed over the past 24 hrs:   No results found for this or any previous visit (from the past 24 hour(s)).  ------------------------- ENCOUNTER LABS ----------------------------------------------------------------  Recent Labs   Lab 01/29/24  0440 01/27/24  0757 01/26/24  2143 01/26/24  1529 01/25/24  2051 01/25/24  1232 01/25/24  1232   WBC 5.3 7.0  --  9.9 9.7  --   12.4*   HGB 8.6* 10.2*  --  11.0* 12.0*  --  12.5*   MCV 84 85  --  86 84  --  85   * 149*  --  161 180  --  197    128* 132* 131* 130*   < >  --    POTASSIUM 4.3 4.5 4.9 4.6 4.3   < >  --    CHLORIDE 108* 102 104 104 99   < >  --    CO2 15* 17* 16* 17* 16*   < >  --    BUN 38.7* 35.9* 34.1* 31.2* 24.6*   < >  --    CR 1.68* 1.95* 2.03* 2.05* 1.77*  --  1.58*   ANIONGAP 12 9 12 10 15   < >  --    LATRELL 8.3* 8.0* 7.8* 7.7* 8.2*   < >  --    GLC 92 84 95 100* 96   < >  --    ALBUMIN  --   --   --   --  2.9*  --   --    PROTTOTAL  --   --   --   --  5.7*  --   --    BILITOTAL  --   --   --   --  0.5  --   --    ALKPHOS  --   --   --   --  257*  --  283*   ALT  --   --   --   --  64  --  66   AST  --   --   --   --  62*  --   --    LIPASE  --   --   --   --  11*  --   --     < > = values in this interval not displayed.       Most Recent 3 CBC's:  Recent Labs   Lab Test 01/29/24  0440 01/27/24  0757 01/26/24  1529   WBC 5.3 7.0 9.9   HGB 8.6* 10.2* 11.0*   MCV 84 85 86   * 149* 161     Most Recent 3 BMP's:  Recent Labs   Lab Test 01/29/24  0440 01/27/24  0757 01/26/24  2143    128* 132*   POTASSIUM 4.3 4.5 4.9   CHLORIDE 108* 102 104   CO2 15* 17* 16*   BUN 38.7* 35.9* 34.1*   CR 1.68* 1.95* 2.03*   ANIONGAP 12 9 12   LATRELL 8.3* 8.0* 7.8*   GLC 92 84 95     Most Recent 2 LFT's:  Recent Labs   Lab Test 01/25/24  2051 01/25/24  1232 12/21/23  0919 11/28/23  1517   AST 62*  --   --  52*   ALT 64 66   < > 48   ALKPHOS 257* 283*   < > 305*   BILITOTAL 0.5  --   --  0.3    < > = values in this interval not displayed.     Most Recent 3 INR's:  Recent Labs   Lab Test 10/15/23  0504 10/07/23  0953 09/27/23  1155   INR 1.14 1.28* 1.29*     Most Recent 3 Troponin's:  Recent Labs   Lab Test 06/04/21  0624 12/24/16  1020 12/24/16  0325   TROPI 0.035 <0.015  The 99th percentile for upper reference range is 0.045 ug/L.  Troponin values in   the range of 0.045 - 0.120 ug/L may be associated with risks of adverse    clinical events.   <0.015  The 99th percentile for upper reference range is 0.045 ug/L.  Troponin values in   the range of 0.045 - 0.120 ug/L may be associated with risks of adverse   clinical events.       Most Recent 3 BNP's:  Recent Labs   Lab Test 10/29/23  0528   NTBNPI 683     Most Recent D-dimer:  Recent Labs   Lab Test 12/23/16  2130   DD 0.3     Most Recent 6 Bacteria Isolates From Any Culture (See EPIC Reports for Culture Details):  Recent Labs   Lab Test 06/04/21  1104 06/04/21  0744 05/27/21  1031 05/27/21  1015 05/27/21  0950 05/12/21  2304   CULT Moderate growth  Normal manuel    Light growth  beta hemolytic   Streptococcus anginosus  This organism is susceptible to ampicillin, penicillin, vancomycin and the cephalosporins.   If treatment is required AND your patient is allergic to penicillin, contact the   Microbiology Lab within 5 days to request susceptibility testing.  * No growth No growth No growth No growth No growth     Most Recent TSH and T4:  Recent Labs   Lab Test 01/02/24  1418   TSH 0.03*   T4 1.21     Most Recent 6 glucoses:  Recent Labs   Lab Test 01/29/24  0440 01/27/24  0757 01/26/24  2143 01/26/24  1529 01/25/24 2051 11/28/23  1517   GLC 92 84 95 100* 96 93     Most Recent Urinalysis:  Recent Labs   Lab Test 01/27/24  0643 10/28/17  1500 01/18/16  1457   COLOR Yellow   < > Yellow   APPEARANCE Clear   < > Clear   URINEGLC Negative   < > Negative   URINEBILI Negative   < > Negative   URINEKETONE Negative   < > Negative   SG 1.023   < > 1.015   UBLD Negative   < > Negative   URINEPH 6.5   < > 7.0   PROTEIN 30*   < > Negative   UROBILINOGEN  --   --  0.2   NITRITE Negative   < > Negative   LEUKEST Negative   < > Negative   RBCU <1   < >  --    WBCU 1   < >  --     < > = values in this interval not displayed.     Most Recent ESR & CRP:  Recent Labs   Lab Test 01/29/24  0440 01/25/24  2051 10/07/23  2056 12/09/22  0901   SED  --  10  --   --    CRP  --   --   --  13.8*   CRPI 50.52* 27.70*    < >  --     < > = values in this interval not displayed.

## 2024-01-29 NOTE — PROGRESS NOTES
Notified provider about indwelling mcdermott catheter discussed removal or continued need.    Did provider choose to remove indwelling mcdermott catheter? NO    Provider's mcdermott indication for keeping indwelling mcdermott catheter: Indication for continued use: Retention    Is there an order for indwelling mcdermott catheter? YES    *If there is a plan to keep mcdermott catheter in place at discharge daily notification with provider is not necessary, but please add a notation in the treatment team sticky note that the patient will be discharging with the catheter.

## 2024-01-29 NOTE — CONSULTS
"SPIRITUAL HEALTH SERVICES - Consult Note  [facility] MedSurg/Onc 88   Referral Source/Reason for Visit: Pt request for Communion    Summary and Recommendations -  New anticipates a PICC tomorrow and discharging home with IV antibiotics. His goal to is to be able to resume cancer treatment which he reports is on hold until an abscess heals.  New named some distress at needing to pause cancer treatment. He is trying to \"hold onto his elke\" and \"make sense\" of what he's going through. He named benefiting from verbal processing as he explores emotions, meaning, and experiences.  Communion is an important part of New's spiritual practice, and it was provided at his request.  New attends [employer] in [place]. Pastor Pandey was able to visit him when he was at [facility]. While at [facility], New appreciates  support.    Plan: Spiritual Health remains available. Please contact as needs arise.    Nita Alberts  Associate     SHS available 24/7 for emergent requests/referrals, either by paging the on-call  or by entering an ASAP/STAT consult in Epic (this will also page the on-call ).    Assessment    Patient/Family Understanding of Illness and Goals of Care - New reflected on his illness narrative and named anticipating receiving a PICC tomorrow and discharging home with IV antibiotics. His goal is to be able to resume cancer treatment which he reports is on hold until an abscess heals.    Distress and Loss -   New named distress at needing to pause cancer treatment, knowing that the cancer is in his body.  He also reflected on the loss of taste buds and limited ability to produce saliva. His itching and and rash is a source of discomfort at this time.  While acknowledging the toll his illness is taking on himself, he acknowledged concern for his wife, as well. Their hopes for this time in their lives (senior care) have changed " "dramatically.    Strengths, Coping, and Resources -   New finds support in his family - wife, two sons (their spouses), and five grandchildren.  He named benefiting from verbal processing as he explores emotion, meaning, and experience.    Meaning, Beliefs, and Spirituality -   Bipin grew up Rastafari but now identifies as Bahai. He is trying to \"hold onto his elke\" and \"make sense of\" of all he's going through.  Communion is an important part of his spiritual practice, and it was provided at his request.  New attends [employer] in [place]. Pastor Pandey was able to visit him when he was at [facility]. While at [facility], New appreciates  support.  "

## 2024-01-29 NOTE — PLAN OF CARE
Orientation: A&Ox4  Activity: ind  Diet/BS Checks: regular  Tele:  n/a  IV Access/Drains: L PIV SL w int abx, mcdermott   Pain Management: oxy given 1x  Abnormal VS/Results: VSS on RA ex. Soft BP and tachy, low temp of 95.8  Bowel/Bladder:mcdermott placed for urinary retention  Skin/Wounds:  full body rash, scattered scabs  Consults: ID  D/C Disposition: pending  Other Info: pt had temp of 95.8 axillary, refused rectal temp and unable to get oral temp. MD notified and warmed pt room and gave pt bear hugger. Temp inc. 97.9. sepsis fired, lactic came back at 1.0. benadryl and atarax given.

## 2024-01-29 NOTE — PROGRESS NOTES
UROLOGY Chart check    Our office will call him to arrange 2wk follow-up with Dr. Rogers.     SANGEETA Ansari [facility]  Office: [phone]  After business hours: [phone]

## 2024-01-29 NOTE — PROGRESS NOTES
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      -Severe generalized drug rash with fever and SIRs related to Levaquin with SURESH. Has been off antibiotics since 1/21  -SURESH superimposed on CKD improving  -Hyponatremia  -Pyogenic liver abscess related to Enterococcus hirae following ablation of metastatic liver lesion. 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 s/p chemoradiation/chemotherapy and ablation of liver lesion in 09/2023  -Recent cholangitis s/p ERCP with sphincterotomy and stent placement 10/16/23, stent exchange 1/15/24   -chronic medical conditions CAD s.p PCI, HTN, hypothyroidism, prostate cancer s/p prostatectomy     Recommendations:  Continue Vancomycin  Continue prednisone  Likely PICC tomorrow for IV Vancomycin at discharge  Follow renal functions  Call out to [facility] to discuss discharge plan        Bharti Gore MD    Interval History   Continues to complain of ongoing edema, rash and pruritis      Physical Exam   Temp: 97.1  F (36.2  C) Temp src: Axillary BP: 119/61 Pulse: 110   Resp: 18 SpO2: 96 % O2 Device: None (Room air)    Vitals:    01/27/24 0417 01/28/24 0016 01/29/24 0606   Weight: 96.2 kg (212 lb) 96.2 kg (212 lb) 94.4 kg (208 lb 1.6 oz)     Vital Signs with Ranges  Temp:  [95.8  F (35.4  C)-97.9  F (36.6  C)] 97.1  F (36.2  C)  Pulse:  [] 110  Resp:  [18-19] 18  BP:  ()/(49-63) 119/61  SpO2:  [94 %-99 %] 96 %    GENERAL APPEARANCE:  awake  EYES: Eyes grossly normal to inspection  NECK: no adenopathy  RESP: lungs clear   CV: regular rates and rhythm  ABDOMEN: soft, nontender  MS: LE edema  SKIN: extensive generalized maculopapular drug rash    Other:    Medications      aspirin  81 mg Oral Daily    heparin ANTICOAGULANT  5,000 Units Subcutaneous Q8H    levothyroxine  88 mcg Oral QAM AC    pramoxine   Rectal TID    predniSONE  20 mg Oral Daily    pregabalin  150 mg Oral BID    rosuvastatin  40 mg Oral Daily    sodium chloride (PF)  3 mL Intracatheter Q8H    tamsulosin  0.4 mg Oral Daily    vancomycin  1,250 mg Intravenous Q24H       Data   All microbiology laboratory data reviewed.  Recent Labs   Lab Test 01/29/24  0440 01/27/24  0757 01/26/24  1529   WBC 5.3 7.0 9.9   HGB 8.6* 10.2* 11.0*   HCT 27.2* 32.1* 35.2*   MCV 84 85 86   * 149* 161     Recent Labs   Lab Test 01/29/24  0440 01/27/24  0757 01/26/24  2143   CR 1.68* 1.95* 2.03*     Recent Labs   Lab Test 01/25/24  2051   SED 10

## 2024-01-29 NOTE — PROGRESS NOTES
1500 - 8900     Orientation: A&Ox4     Activity: Independent in room   Diet/BS Checks: Regular   Tele: N/A  IV Access/Drains: RPIV CDI SL, Dr. Jessica placed a mcdermott for urinary retention.1 L of urine was drained immediately after placement.  Pain Management: PRN 10 mg Oxy given x 2  Abnormal VS/Results: Tachycardic at times, afebrile this shift.   Skin/Wounds: Rash all over body with scratches.   Consults: ID  D/C Disposition: Pending   Other Info: Pt c/o itching rash

## 2024-01-29 NOTE — CONSULTS
Care Management Initial Consult    General Information  Assessment completed with: Patient, Spouse or significant other, New and spouse Cheri  Type of CM/SW Visit: Initial Assessment    Primary Care Provider verified and updated as needed: Yes   Readmission within the last 30 days: unable to assess      Reason for Consult: discharge planning, other (see comments) (Elevated risk of readmission)  Advance Care Planning: Advance Care Planning Reviewed: present on chart          Communication Assessment  Patient's communication style: spoken language (English or Bilingual)    Hearing Difficulty or Deaf: no   Wear Glasses or Blind: yes    Cognitive  Cognitive/Neuro/Behavioral: WDL                      Living Environment:   People in home: spouse  Cheri  Current living Arrangements: house      Able to return to prior arrangements: yes       Family/Social Support:  Care provided by: self  Provides care for: no one  Marital Status:   Wife, Children  Cheri       Description of Support System: Supportive, Involved         Current Resources:   Patient receiving home care services: No     Community Resources: None  Equipment currently used at home: none  Supplies currently used at home: None    Employment/Financial:  Employment Status: retired        Financial Concerns: none   Referral to Financial Worker: No       Does the patient's insurance plan have a 3 day qualifying hospital stay waiver?  No    Lifestyle & Psychosocial Needs:  Social Determinants of Health     Food Insecurity: Low Risk  (12/28/2023)    Food Insecurity     Within the past 12 months, did you worry that your food would run out before you got money to buy more?: No     Within the past 12 months, did the food you bought just not last and you didn t have money to get more?: No   Depression: Not at risk (1/4/2024)    PHQ-2     PHQ-2 Score: 0   Housing Stability: Low Risk  (12/28/2023)    Housing Stability     Do you have housing? : Yes     Are you worried  about losing your housing?: No   Tobacco Use: Low Risk  (1/26/2024)    Patient History     Smoking Tobacco Use: Never     Smokeless Tobacco Use: Never     Passive Exposure: Not on file   Financial Resource Strain: Low Risk  (12/28/2023)    Financial Resource Strain     Within the past 12 months, have you or your family members you live with been unable to get utilities (heat, electricity) when it was really needed?: No   Alcohol Use: Not on file   Transportation Needs: Low Risk  (12/28/2023)    Transportation Needs     Within the past 12 months, has lack of transportation kept you from medical appointments, getting your medicines, non-medical meetings or appointments, work, or from getting things that you need?: No   Physical Activity: Not on file   Interpersonal Safety: Low Risk  (1/25/2024)    Interpersonal Safety     Do you feel physically and emotionally safe where you currently live?: Yes     Within the past 12 months, have you been hit, slapped, kicked or otherwise physically hurt by someone?: No     Within the past 12 months, have you been humiliated or emotionally abused in other ways by your partner or ex-partner?: No   Stress: Not on file   Social Connections: Not on file       Functional Status:  Prior to admission patient needed assistance:   Dependent ADLs:: Independent  Dependent IADLs:: Independent       Mental Health Status:          Chemical Dependency Status:                Values/Beliefs:  Spiritual, Cultural Beliefs, Taoism Practices, Values that affect care: yes (Shital)               Additional Information:  Per consult for elevated risk of readmission and discharge planning, met with patient and his spouse Cheri to discuss discharge planning.  Per pt, prior to admit he was independent with his ADLs & IADLs.  Patient shared that he was open to [facility] for home IV abxs that his spouse was helping out with but this stopped recently.  Patient shared that he has an appointment at [place] on  Feb. 5th.  Discussed that Urology is recommending discharging home with the mcdermott catheter and that their clinic will be calling patient to schedule follow-up for trial void.  Offered home care RN for ongoing mcdermott catheter care and education and per pt, his spouse will be able to assist him with mcdermott care and no home care needed.  Discussed awaiting ID recommendations for abx at discharge.  Per pt, he was paying privately for home IV abxs.   Await ID recommendations for abxs at discharge.     Inpatient Care Coordinator will continue to follow for discharge planning.  RUTH Barrera RN, BSN, OCN   Inpatient Care Coordination [address]  Office: [phone]

## 2024-01-30 NOTE — PHARMACY-VANCOMYCIN DOSING SERVICE
Pharmacy Vancomycin Note  Date of Service 2024  Patient's  1950   73 year old, male    Indication: Abscess  Day of Therapy: 5  Current vancomycin regimen:  1250 mg IV q24h  Current vancomycin monitoring method: AUC  Current vancomycin therapeutic monitoring goal: 400-600 mg*h/L    Insight[employer] Prediction of Current Vancomycin Regimen  Regimen: 1250 mg IV every 24 hours.  Start time: 23:34 on 2024  Exposure target: AUC24 (range)400-600 mg/L.hr   AUC24,ss: 497 mg/L.hr  Probability of AUC24 > 400: 91 %  Ctrough,ss: 15.8 mg/L  Probability of Ctrough,ss > 20: 16 %  Probability of nephrotoxicity (Lodise JAYASHREE ): 11 %    Current estimated CrCl = Estimated Creatinine Clearance: 44.3 mL/min (A) (based on SCr of 1.68 mg/dL (H)).    Creatinine for last 3 days  2024:  7:57 AM Creatinine 1.95 mg/dL  2024:  4:40 AM Creatinine 1.68 mg/dL    Recent Vancomycin Levels (past 3 days)  2024: 10:38 PM Vancomycin 10.0 ug/mL  2024:  9:11 PM Vancomycin 15.7 ug/mL    Vancomycin IV Administrations (past 72 hours)                     vancomycin (VANCOCIN) 1,250 mg in 0.9% NaCl 250 mL intermittent infusion (mg) 1,250 mg New Bag 24 2334    vancomycin (VANCOCIN) 1,250 mg in 0.9% NaCl 250 mL intermittent infusion (mg) 1,250 mg New Bag 24 0023    vancomycin (VANCOCIN) 1,000 mg in 200 mL dextrose intermittent infusion (mg) 1,000 mg New Bag 24 2325                    Nephrotoxins and other renal medications (From now, onward)      Start     Dose/Rate Route Frequency Ordered Stop    24 2300  vancomycin (VANCOCIN) 1,250 mg in 0.9% NaCl 250 mL intermittent infusion         1,250 mg  over 90 Minutes Intravenous EVERY 24 HOURS 24 1130                Interpretation of levels and current regimen:  Vancomycin level is reflective of -600  Has serum creatinine changed greater than 50% in last 72 hours: Yes  Urine output:  good urine output  Renal Function:  Improving    Plan:  Continue Current Dose  Vancomycin monitoring method: AUC  Vancomycin therapeutic monitoring goal: 400-600 mg*h/L  Pharmacy will check vancomycin levels as appropriate in 3-5 Days.  Serum creatinine levels will be ordered a minimum of twice weekly.    Preet Owen RPH

## 2024-01-30 NOTE — PROGRESS NOTES
Medicine Progress Note - Hospitalist Service    Date of Admission:  1/26/2024  Date of Service: 01/30/2024    Assessment & Plan        Quan Murphy is a 73 year old male admitted on 1/26/2024.  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    Recent complex medical history with multiple hospitalizations and changes to antibiotic plan as detailed in admission HPI.    Septic shock due to liver abscess due to Enterobacter hirae  Lactic acidosis  *abscess initially noted on imaging in late October but was without infectious symptoms, underwent biopsy (drain placement not possible) with positive culture in December  *has been off antibiotics for liver abscess since 1/21  *developed fever in the 24 hours prior to arrival, on admission febrile (100.4), tachycardic (107), hypotensive (79/56) with leukocytosis (12.5) and lactate 2.5  *pressures normalized with 4L IVF and remain stable  *CT abd/pelvis showing slight increase in size of liver abscess; CXR negative  Lactate 2.4 now normalized .,  Plan:  - continue vanco   - ID following -> Likely PICC tomorrow for IV Vancomycin at discharge   - blood cultures not drawn in ED, will obtain if spikes fever again  - SW consulted for home infusion  - US abdomen 01/30 -> No recurrent hepatic abscess. Several small hepatic metastases again seen.    SURESH on CKD stage III  *baseline Cr 0.8-1.0; admission Cr 1.58 but trending up on repeat to 2.0, likely due to sepsis  - Improving, but will hold off IVF today due to significant edema  - Cr improving, 1.68 -> 1.52 today    Hyponatremia  *admission Na 131 -> 128 with IVF  Plan:  - hold further IVF  - BMP in AM -> Na now normalized at 142    Suspected drug rash due to  "levofloxacin  *developed after starting levofloxacin, evaluated by Derm and felt to be drug rash - now slowly improving off levo per patient  - continue PTA atarax  - monitor  - Benadryl as needed  - Prednisone added, will continue as rash now improving    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  *previously undergone chemo and radiation  *alk phos and AST mildly elevated at admission, stable from prior  - anticipate any treatment will be on hold pending resolution of infection    Recent cholangitis s/p ERCP with sphincterotomy and stent placement 10/16/23, stent exchange 1/15/24  - follow up outpatient    CAD s/p PCI to LAD >20 years ago  HTN  - Resume PTA metoprolol   - continue PTA aspirin    Hypothyroid  - continue PTA levothyroxine    Prostate cancer s/p prostatectomy, in remission  Urinary Retention  Noted  Urology was consulted for retention -> Due to the degree of bladder distention, I recommend that the catheter remain in place for 2 weeks before removal and trial of void.           Diet: Regular Diet Adult    DVT Prophylaxis: Pneumatic Compression Devices  Stone Catheter: PRESENT, indication: Retention  Lines: PRESENT      PICC 01/30/24 Single Lumen Right Basilic-Site Assessment: WDL    Cardiac Monitoring: None  Code Status: Full Code      Clinically Significant Risk Factors              # Hypoalbuminemia: Lowest albumin = 2.9 g/dL at 1/25/2024  8:51 PM, will monitor as appropriate       # Hypertension: Noted on problem list        # Obesity: Estimated body mass index is 31.64 kg/m  as calculated from the following:    Height as of 1/25/24: 1.727 m (5' 8\").    Weight as of this encounter: 94.4 kg (208 lb 1.6 oz)., PRESENT ON ADMISSION       # Financial/Environmental Concerns: none         Disposition Plan      Expected Discharge Date: 01/31/2024                    Kevin Kiran MD  Hospitalist Service  [facility]  Securely message with [email or web address] (more info)  Text page via [employer] " Mark[employer]Gorge   ______________________________________________________________________    Interval History     No acute events overnight  Reports ongoing diffuse and itchy rash but improving overall  No fevers  No nausea / vomiting or abdominal pain today  No CP/SOB  Tolerating mcdermott  No new complaints.     Physical Exam   Vital Signs: Temp: 98  F (36.7  C) Temp src: Axillary BP: 128/65 Pulse: 79   Resp: 18 SpO2: 100 % O2 Device: None (Room air)    Weight: 208 lbs 1.6 oz    General Appearance: well nourished male in NAD  Respiratory: lungs CTAB, no wheezes or crackles  Cardiovascular: RRR, normal s1/s2 without murmur  GI: abdomen soft, normal bowel sounds, nont-connor  : mcdermott catheter present.   Lymph/Hematologic: mild LE edema bilaterally  Skin: diffuse blanchable erythematous rash over trunk and extremities  Musculoskeletal: extremities warm and well perfused   Neurologic: alert and appropriate, Kendra.  Psychiatric: normal affect   ----------------------------------------------------------------------------------------    Medical Decision Making       ------------------ MEDICAL DECISION MAKING ------------------------------------------------------------------------------------------------------  High Complexity Decision Making       Data   ------------------------- PAST 24 HR DATA REVIEWED -----------------------------------------------    I have personally reviewed the following data over the past 24 hrs:    8.9  \   9.9 (L)   / 199     142 113 (H) 30.3 (H) /  89   4.1 18 (L) 1.52 (H) \     Procal: N/A CRP: 27.47 (H) Lactic Acid: N/A         Imaging results reviewed over the past 24 hrs:   Recent Results (from the past 24 hour(s))   US Abdomen Limited    Narrative    ULTRASOUND ABDOMEN LIMITED  1/30/2024 2:20 PM    CLINICAL HISTORY: Liver lesions. Rule out recurrent abscess.    TECHNIQUE: Limited abdominal ultrasound.    COMPARISON: CT 1/25/2024.    FINDINGS: Ablation defect noted. There are at least five  liver lesions  consistent with metastases. No hepatic abscess.      Impression    IMPRESSION:  1.  No recurrent hepatic abscess.  2.  Several small hepatic metastases again seen.    SHANE PAREDES MD         SYSTEM ID:  [id number]     ------------------------- ENCOUNTER LABS ----------------------------------------------------------------  Recent Labs   Lab 01/30/24  0659 01/29/24  0440 01/29/24  0019 01/27/24  0757 01/26/24  1529 01/25/24  2051 01/25/24  1232   WBC 8.9 5.3  --  7.0   < > 9.7 12.4*   HGB 9.9* 8.6*  --  10.2*   < > 12.0* 12.5*   MCV 83 84  --  85   < > 84 85    145*  --  149*   < > 180 197    135  --  128*   < > 130*  --    POTASSIUM 4.1 4.3  --  4.5   < > 4.3  --    CHLORIDE 113* 108*  --  102   < > 99  --    CO2 18* 15*  --  17*   < > 16*  --    BUN 30.3* 38.7*  --  35.9*   < > 24.6*  --    CR 1.52* 1.68*  --  1.95*   < > 1.77* 1.58*   ANIONGAP 11 12  --  9   < > 15  --    LATRELL 8.8 8.3*  --  8.0*   < > 8.2*  --    GLC 89 92 103* 84   < > 96  --    ALBUMIN  --   --   --   --   --  2.9*  --    PROTTOTAL  --   --   --   --   --  5.7*  --    BILITOTAL  --   --   --   --   --  0.5  --    ALKPHOS  --   --   --   --   --  257* 283*   ALT  --   --   --   --   --  64 66   AST  --   --   --   --   --  62*  --    LIPASE  --   --   --   --   --  11*  --     < > = values in this interval not displayed.       Most Recent 3 CBC's:  Recent Labs   Lab Test 01/30/24  0659 01/29/24  0440 01/27/24  0757   WBC 8.9 5.3 7.0   HGB 9.9* 8.6* 10.2*   MCV 83 84 85    145* 149*     Most Recent 3 BMP's:  Recent Labs   Lab Test 01/30/24 0659 01/29/24 0440 01/29/24  0019 01/27/24  0757    135  --  128*   POTASSIUM 4.1 4.3  --  4.5   CHLORIDE 113* 108*  --  102   CO2 18* 15*  --  17*   BUN 30.3* 38.7*  --  35.9*   CR 1.52* 1.68*  --  1.95*   ANIONGAP 11 12  --  9   LATRELL 8.8 8.3*  --  8.0*   GLC 89 92 103* 84     Most Recent 2 LFT's:  Recent Labs   Lab Test 01/25/24 2051 01/25/24  1232 12/21/23  0919  11/28/23  1517   AST 62*  --   --  52*   ALT 64 66   < > 48   ALKPHOS 257* 283*   < > 305*   BILITOTAL 0.5  --   --  0.3    < > = values in this interval not displayed.     Most Recent 3 INR's:  Recent Labs   Lab Test 10/15/23  0504 10/07/23  0953 09/27/23  1155   INR 1.14 1.28* 1.29*     Most Recent 3 Troponin's:  Recent Labs   Lab Test 06/04/21  0624 12/24/16  1020 12/24/16  0325   TROPI 0.035 <0.015  The 99th percentile for upper reference range is 0.045 ug/L.  Troponin values in   the range of 0.045 - 0.120 ug/L may be associated with risks of adverse   clinical events.   <0.015  The 99th percentile for upper reference range is 0.045 ug/L.  Troponin values in   the range of 0.045 - 0.120 ug/L may be associated with risks of adverse   clinical events.       Most Recent 3 BNP's:  Recent Labs   Lab Test 10/29/23  0528   NTBNPI 683     Most Recent D-dimer:  Recent Labs   Lab Test 12/23/16  2130   DD 0.3     Most Recent 6 Bacteria Isolates From Any Culture (See EPIC Reports for Culture Details):  Recent Labs   Lab Test 06/04/21  1104 06/04/21  0744 05/27/21  1031 05/27/21  1015 05/27/21  0950 05/12/21  2304   CULT Moderate growth  Normal manuel    Light growth  beta hemolytic   Streptococcus anginosus  This organism is susceptible to ampicillin, penicillin, vancomycin and the cephalosporins.   If treatment is required AND your patient is allergic to penicillin, contact the   Microbiology Lab within 5 days to request susceptibility testing.  * No growth No growth No growth No growth No growth     Most Recent TSH and T4:  Recent Labs   Lab Test 01/02/24  1418   TSH 0.03*   T4 1.21     Most Recent 6 glucoses:  Recent Labs   Lab Test 01/30/24  0659 01/29/24  0440 01/29/24  0019 01/27/24  0757 01/26/24  2143 01/26/24  1529   GLC 89 92 103* 84 95 100*     Most Recent Urinalysis:  Recent Labs   Lab Test 01/27/24  0643 10/28/17  1500 01/18/16  1457   COLOR Yellow   < > Yellow   APPEARANCE Clear   < > Clear   URINEGLC  Negative   < > Negative   URINEBILI Negative   < > Negative   URINEKETONE Negative   < > Negative   SG 1.023   < > 1.015   UBLD Negative   < > Negative   URINEPH 6.5   < > 7.0   PROTEIN 30*   < > Negative   UROBILINOGEN  --   --  0.2   NITRITE Negative   < > Negative   LEUKEST Negative   < > Negative   RBCU <1   < >  --    WBCU 1   < >  --     < > = values in this interval not displayed.     Most Recent ESR & CRP:  Recent Labs   Lab Test 01/30/24  0659 01/29/24  0440 01/25/24  2051 10/07/23  2056 12/09/22  0901   SED  --   --  10  --   --    CRP  --   --   --   --  13.8*   CRPI 27.47*   < > 27.70*   < >  --     < > = values in this interval not displayed.

## 2024-01-30 NOTE — PROGRESS NOTES
[facility]    Infectious Disease Progress Note    Date of Service : 01/30/2024     Assessment:  73YM with oropharynx squamous cell carcinoma status post chemoradiation/chemotherapy and ablation of liver lesion in 09/2023 complicated by cholangitis/ gram negative bacteremia, then  have hepatic abscess at the ablation site with Enterococcus hirae at the ablation site s/p biopsy on 12/12/2023 which showed organizing abscess with granulation tissue, negative for malignancy, treated with Ceftriaxone/Vancomycin and transitioned to oral Levaquin with course complicated by a severe drug rash.      -Severe generalized drug rash , fever, SURESH and SIRs related to Levaquin. Has been off antibiotics since 1/21  -SURESH superimposed on CKD improving  -Hyponatremia  -Pyogenic liver abscess following ablation of metastatic liver lesion 09/2023. S/p biopsy/IR drain on 12/12/2023 with cx positive for Enterococcus hirae. 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 s/p chemoradiation/chemotherapy and ablation of liver lesion in 09/2023  -Cholangitis s/p ERCP with sphincterotomy and stent placement 10/16/23, enterococcus hirae bacteremia 10/2023 (per notes, I did not see cx data in care everywhere) , stent exchange 1/15/24   -chronic medical conditions CAD s.p PCI, HTN, hypothyroidism, prostate cancer s/p prostatectomy     Recommendations:  Continue Vancomycin  PICC  Consult IR to see if liver lesions are amenable to any drainage -ordered  Short source prednisone  Plan IV Vancomycin at discharge and close follow up with [facility] ID team in 1-2 weeks. Will need further imaging to monitor for resolution of infection and could potentially transition to oral Linezolid at a later date once infection is better controlled   Continue to hold Celexa and other antidepressants for  now  Care integration for discharge planning      Bharti Gore MD    Interval History   Feels better though still very edematous with extensive erythema of skin       Physical Exam   Temp: 97.4  F (36.3  C) Temp src: Axillary BP: 112/64 Pulse: 78   Resp: 18 SpO2: 97 % O2 Device: None (Room air)    Vitals:    01/27/24 0417 01/28/24 0016 01/29/24 0606   Weight: 96.2 kg (212 lb) 96.2 kg (212 lb) 94.4 kg (208 lb 1.6 oz)     Vital Signs with Ranges  Temp:  [96.8  F (36  C)-98  F (36.7  C)] 97.4  F (36.3  C)  Pulse:  [78-89] 78  Resp:  [18] 18  BP: (112-125)/(61-65) 112/64  SpO2:  [96 %-99 %] 97 %    GENERAL APPEARANCE:  awake  EYES: Eyes grossly normal to inspection  RESP: lungs clear   MS: LE edema  SKIN: extensive generalized maculopapular drug rash    Other:    Medications      aspirin  81 mg Oral Daily    heparin ANTICOAGULANT  5,000 Units Subcutaneous Q8H    levothyroxine  88 mcg Oral QAM AC    pramoxine   Rectal TID    predniSONE  20 mg Oral Daily    pregabalin  150 mg Oral BID    rosuvastatin  40 mg Oral Daily    sodium chloride (PF)  3 mL Intracatheter Q8H    tamsulosin  0.4 mg Oral Daily    vancomycin  1,250 mg Intravenous Q24H       Data   All microbiology laboratory data reviewed.  Recent Labs   Lab Test 01/30/24  0659 01/29/24  0440 01/27/24  0757   WBC 8.9 5.3 7.0   HGB 9.9* 8.6* 10.2*   HCT 30.9* 27.2* 32.1*   MCV 83 84 85    145* 149*     Recent Labs   Lab Test 01/30/24  0659 01/29/24  0440 01/27/24  0757   CR 1.52* 1.68* 1.95*     Recent Labs   Lab Test 01/25/24  2051   SED 10

## 2024-01-30 NOTE — PROGRESS NOTES
Notified provider about indwelling mcdermott catheter discussed removal or continued need.    Did provider choose to remove indwelling mcdermott catheter? YES    Provider's mcdermott indication for keeping indwelling mcdermott catheter: Indication for continued use: Retention    Is there an order for indwelling mcdermott catheter? YES    *If there is a plan to keep mcdermott catheter in place at discharge daily notification with provider is not necessary, but please add a notation in the treatment team sticky note that the patient will be discharging with the catheter.       According to urology note 1/28/24, plan for mcdermott to remain in place for two weeks

## 2024-01-30 NOTE — PROGRESS NOTES
Care Management Follow Up    Length of Stay (days): 4    Expected Discharge Date: 01/31/2024     Concerns to be Addressed:       Patient plan of care discussed at interdisciplinary rounds: Yes    Anticipated Discharge Disposition: Home     Anticipated Discharge Services:    Anticipated Discharge DME:      Patient/family educated on Medicare website which has current facility and service quality ratings:    Education Provided on the Discharge Plan:    Patient/Family in Agreement with the Plan: yes    Referrals Placed by CM/SW: Internal Clinic Care Coordination  Private pay costs discussed: Not applicable    Additional Information:  Per chart review indicating patient will need IV abx at discharge. Met with patient to discuss discharge plan of home with [place] support for IV abx.  Patient shared that he would like to use [facility].  Patient was informed that [facility] is checking his insurance benefit for home IV abx coverage.  Patient stated he would like to go with [facility].  Email sent to [facility] and Sushma MIRANDA Liaison asking for insurance check.  Emailed Sushma MIRANDA Liaison that pt would like to go [facility].  Called [facility] [phone] and spoke with Aiden.  Informed Aiden that pt will be discharging home with IV vanco via PICC line and that pt would like to use [facility].  Per Aiden, insurance check will be done for home IV abx coverage and she will call back RNCC.     Addendum:  Received call from Teetee Aquino RN Liaison with [facility] [phone].  Per Teetee, pt doesn't have coverage for home IV abx with his insurance, [facility] [place] cost for pt for IV vanco at home would be Drug copay: $62.78/week Per julianne supplies: $140/week Total per week: $202.78 that the patient would be responsible for.  Per [facility] has accepted patient for Skilled RN services for blood draws, line care and mcdermott catheter care.  He will be serviced out of the [facility] phone number  [phone]  and fax number [phone]. They will call the patient to set up a SOC visit once he discharges.    Met with patient and discussed cost of [facility] for IV drug/supplies per week and HC agency that will be following patient.  Per pt's spouse, she would like to use [facility] for pt's weekly blood draw and PICC line care.  RNCC will arrange prior to discharge.  Called [facility] [place] and spoke with  to schedule pt to see Dr. oRgers in 2 weeks, per  she would need to send a message to the team and they would call RNCC back with a scheduled appt.  Inpatient Care Coordinator will continue to follow for discharge planning.  RUTH Barrera RN, BSN, OCN   Inpatient Care Coordination [address]  Office: [phone]

## 2024-01-30 NOTE — PLAN OF CARE
Goal Outcome Evaluation:  1/29/24  1900-0730H    Orientation: A/Ox4  Activity: up ind in the room  Diet/BS Checks: reg  Tele:  none  IV Access/Drains: R PIV int with abx  Pain Management: Managed by Oxy and Tylenol  Abnormal VS/Results: VSS,RA  Bowel/Bladder: bowel-cont, mcdermott in place with adequate UO  Skin/Wounds: generalized itchiness, redness/rashes  Consults: ID/Urology  D/C Disposition: pending with overall improvement   Other Info: Oxy 10mg PO PRN and Tylenol PO PRN [id number] for pain, Atarax PO PRN given 2x and Benadryl PO PRN 1x for itchiness. Able to sleep in between cares.  Per ID plan for possible PICC placement today for IV Vancomycin at discharge .

## 2024-01-30 NOTE — PROVIDER NOTIFICATION
MD Notification    Notified Person: MD    Notified Person Name: naga     Notification Date/Time: 1/30 1500    Notification Interaction: vocera    Purpose of Notification: please see abdomen US impression    Orders Received: no changes    Comments: no changes

## 2024-01-30 NOTE — PROVIDER NOTIFICATION
MD Notification    Notified Person: MD    Notified Person Name: naga    Notification Date/Time: 1/30 1035    Notification Interaction: juli    Purpose of Notification: is patient getting picc lined placed today? Infectious disease note says likely today but no order    Orders Received:    Comments: order just placed by ID

## 2024-01-30 NOTE — TELEPHONE ENCOUNTER
[facility] Call Center    Phone Message    May a detailed message be left on voicemail: no     Reason for Call: Nurse Payan called stating that Dr. Rogers recommended patient to see him 2 weeks follow up. Nurse Payan want the scheduling to call her on this number [phone] please. Thank you    Action Taken: Other: [facility]    Travel Screening: Not Applicable

## 2024-01-30 NOTE — PROCEDURES
[facility]    Single Lumen PICC Placement    Date/Time: 1/30/2024 1:14 PM    Performed by: Sushma Rojas RN  Authorized by: Bharti Gore MD  Indications: vascular access      UNIVERSAL PROTOCOL   Site Marked: Yes  Prior Images Obtained and Reviewed:  Yes  Required items: Required blood products, implants, devices and special equipment available    Patient identity confirmed:  Verbally with patient, hospital-assigned identification number, arm band and provided demographic data  NA - No sedation, light sedation, or local anesthesia  Confirmation Checklist:  Patient's identity using two indicators, relevant allergies, correct equipment/implants were available and procedure was appropriate and matched the consent or emergent situation  Time out: Immediately prior to the procedure a time out was called    Universal Protocol: the Joint Commission Universal Protocol was followed    Preparation: Patient was prepped and draped in usual sterile fashion    ESBL (mL):  2     ANESTHESIA    Local Anesthetic:  Lidocaine 1% without epinephrine  Anesthetic Total (mL):  2      SEDATION    Patient Sedated: No        Preparation: skin prepped with ChloraPrep  Skin prep agent: skin prep agent completely dried prior to procedure  Sterile barriers: maximum sterile barriers were used: cap, mask, sterile gown, sterile gloves, and large sterile sheet  Hand hygiene: hand hygiene performed prior to central venous catheter insertion  Type of line used: PICC  Catheter type: single lumen  Catheter size: 4 Fr  Brand: Bard  Placement method: ultrasound and MST  Number of attempts: 1  Difficulty threading catheter: no  Successful placement: yes  Orientation: right    Location: basilic vein  Tip Location: SVC  Arm circumference: adults 10 cm  Extremity circumference: 34  Visible catheter length: 6  Total catheter length: 48  Dressing and securement: adhesive securement device, chlorhexidine disc applied, occlusive  dressing applied and sterile dressing applied  Post procedure assessment: blood return through all ports and placement verified by 3CG technology  PROCEDURE   Patient Tolerance:  Patient tolerated the procedure well with no immediate complications   Picc placed without difficulty/picc ok to use  Disposal: sharps and needle count correct at the end of procedure, needles and guidewire disposed in sharps container

## 2024-01-30 NOTE — CONSULTS
IR consult received. Liver lesions and ablation defect noted on CT. Also reviewed prior imaging here and at [place]. No definite recurrent abscess. Will order ultrasound to see if any small abscesses visualized by US, unlikely. If fluid collection visualized by US then will aspirate.    Rodrigue Ignacio MD

## 2024-01-31 NOTE — TELEPHONE ENCOUNTER
As of current, pt is still hospitalized.  Writer reached out to O[facility] and there no one picked up.       Anita VERMA RN   [facility], Urology   1/31/2024 8:54 AM

## 2024-01-31 NOTE — PROGRESS NOTES
MD Notification    Notified Person: MD    Notified Person Name: Sainz    Notification Date/Time: 12:46    Notification Interaction: US L Arm +DVT    Purpose of Notification:    Orders Received:    Comments:

## 2024-01-31 NOTE — PLAN OF CARE
Goal Outcome Evaluation:      1/30/24  1900-0730H     Orientation: A/Ox4  Activity: up ind in the room  Diet/BS Checks: reg  Tele:  none  IV Access/Drains: R PIV SL, R PICC SL int Abx  Pain Management: Managed by Oxy and Tylenol  Abnormal VS/Results: VSS exc HTN,RA  Bowel/Bladder: bowel-cont, mcdermott in place with adequate UO, No BM this shift  Skin/Wounds: generalized itchiness, redness/rashes, scattered abrasions due to itchiness  Consults: ID/Urology/IR/SW  D/C Disposition: pending plan for home infusion (IV Vanco) and ff up with [facility] ID team  Other Info: Oxy 10mg PO PRN and Tylenol PO PRN [id number] for pain, Atarax PO PRN and Benadryl PO PRN 1x for itchiness. Able to sleep in between cares.    Per Uro note catheter will remain in place for 2 weeks before removal and trial of void

## 2024-01-31 NOTE — PHARMACY-VANCOMYCIN DOSING SERVICE
Renal function continues to improve. AUC per Insights program predicting AUC of 413 today. Plan to discharge home with home infusion today, so will move time of dose earlier for more convenient dosing, which should be fine with his current AUC and improving renal function. Retimed for 1800 today. Will order level for tomorrow morning if pt still here, or if discharged will have home infusion check level tomorrow or the following day to assess dose. Alina BenitezD

## 2024-01-31 NOTE — PROGRESS NOTES
"Orientation: A&Ox4  Activity: independent  Diet/BS Checks: Regular diet  Tele:  N/A  IV Access/Drains: right forearm piv, SL. Right arm PICC in place for long term abx use.  Pain Management: reports 6-7/10 pain \"all over\", prn tylenol and oxycodone x2  Abnormal VS/Results: VSS on RA. No BP to right arm  Bowel/Bladder: Stone in place for retention, plan to discharge with it.   Skin/Wounds: full body rash, scheduled sarna lotion and prn benadryl and atrax available. Scabs to BLE from scratching rash  Consults: ID, urology  D/C Disposition: pending plan for abx, providers consulting with [facility]  Other Info: Ambulated unit multiple times. Abdominal US completed. PICC placed to right arm, heat pack applied per orders.  "

## 2024-01-31 NOTE — PLAN OF CARE
Discharge Note:  Patient & spouse provided w/[facility] catheter education & supplies, as well as AVS paperwork.  Patient discharging home w/spouse & discharge medications will be picked up @[facility].

## 2024-01-31 NOTE — PROGRESS NOTES
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      -Severe generalized drug rash , fever, SURESH and SIRs related to Levaquin. Has been off antibiotics since 1/21  -SURESH superimposed on CKD improving  -Lft elevation related to tumor/ rash  -Pyogenic liver abscess following ablation of metastatic liver lesion 09/2023. S/p biopsy/IR drain on 12/12/2023 with cx positive for Enterococcus hirae. 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 s/p chemoradiation/chemotherapy and ablation of liver lesion in 09/2023  -Cholangitis s/p ERCP with sphincterotomy and stent placement 10/16/23, enterococcus hirae bacteremia 10/2023 (per notes, I did not see cx data in care everywhere) , stent exchange 1/15/24   -chronic medical conditions CAD s.p PCI, HTN, hypothyroidism, prostate cancer s/p prostatectomy     Recommendations:  US was repeated to assess for drainable focus of infection and no abscess was noted, hepatic lesions are consistent with metastases  CT on admission noted hypoenhancement at the ablation site which appeared slightly larger since prior exam. It is unclear whether this is due to persistent abscess vs tumor recurrence  Patient has had about 4 weeks of appropriate therapy (Vancomycin/ceftriaxone 12/18-1/12) and vancomycin again since 1/25  -present  Would suggest continuing vancomycin for now. Patient has PET scan on 2/5 and IF follow up at [facility]. Based upon PET ,may need additional  MRI liver W/Wo contrast for further assessment prior to discontinuing Vancomycin.   Taper and discontinue steroids  PICC and IV vancomycin  OPIV antibiotic orders placed in Epic    Patient is to follow with his [facility] ID team as scheduled and will need MRI liver in the near future  Can discharge from the ID stand point once antibiotics are arranged    Bharti Gore MD    Interval History   Feels ok, rash is improving, renal functions are also better, tolerating vancomycin without side effects    Physical Exam   Temp: 97.2  F (36.2  C) Temp src: Oral BP: 135/68 Pulse: 71   Resp: 16 SpO2: 98 % O2 Device: None (Room air)    Vitals:    01/27/24 0417 01/28/24 0016 01/29/24 0606   Weight: 96.2 kg (212 lb) 96.2 kg (212 lb) 94.4 kg (208 lb 1.6 oz)     Vital Signs with Ranges  Temp:  [97.2  F (36.2  C)-98.3  F (36.8  C)] 97.2  F (36.2  C)  Pulse:  [71-89] 71  Resp:  [16-18] 16  BP: (128-146)/(65-73) 135/68  SpO2:  [96 %-100 %] 98 %    GENERAL APPEARANCE:  awake  EYES: Eyes grossly normal to inspection  RESP: lungs clear   MS: LE edema  SKIN: extensive generalized maculopapular drug rash    Other:    Medications      aspirin  81 mg Oral Daily    heparin ANTICOAGULANT  5,000 Units Subcutaneous Q8H    levothyroxine  88 mcg Oral QAM AC    metoprolol succinate ER  25 mg Oral Daily    pramoxine   Rectal TID    predniSONE  20 mg Oral Daily    pregabalin  150 mg Oral BID    rosuvastatin  40 mg Oral Daily    sodium chloride (PF)  10-40 mL Intracatheter Q7 Days    sodium chloride (PF)  3 mL Intracatheter Q8H    tamsulosin  0.4 mg Oral Daily    vancomycin  1,250 mg Intravenous Q24H       Data   All microbiology laboratory data reviewed.  Recent Labs   Lab Test 01/31/24  0614 01/30/24  0659 01/29/24  0440   WBC 5.6 8.9 5.3   HGB 8.5* 9.9* 8.6*   HCT 27.2* 30.9* 27.2*   MCV 83 83 84     199 145*     Recent Labs   Lab Test 01/31/24  0614 01/30/24  0659 01/29/24  0440   CR 1.32* 1.52* 1.68*     Recent Labs   Lab Test 01/25/24  2051   SED 10     Component      Latest Ref Rng 1/31/2024  6:14 AM   CRP Inflammation      <5.00 mg/L 12.08 (H)       Component      Latest Ref Rng 1/29/2024  9:11 PM   Vancomycin        ug/mL 15.7      Imaging  1/26 US liver  ULTRASOUND ABDOMEN LIMITED  1/30/2024 2:20 PM     CLINICAL HISTORY: Liver lesions. Rule out recurrent abscess.     TECHNIQUE: Limited abdominal ultrasound.     COMPARISON: CT 1/25/2024.     FINDINGS: Ablation defect noted. There are at least five liver lesions  consistent with metastases. No hepatic abscess.                                                                      IMPRESSION:  1.  No recurrent hepatic abscess.  2.  Several small hepatic metastases again seen.      CT chest abdomen pelvis  CT ABDOMEN PELVIS W/O & W CONTRAST 1/25/2024 1:43 PM     CLINICAL HISTORY: Liver mass     TECHNIQUE: CT scan of the abdomen and pelvis was performed following  injection of IV contrast. Multiplanar reformats were obtained.  Precontrast CT abdomen. Postcontrast CT abdomen performed during the  arterial and portal venous phases. Portal venous phase scanning of the  pelvis. Dose reduction techniques were used.     CONTRAST: Isovue 370,  95mL     COMPARISON: CT 1/9/2024.     FINDINGS:   LOWER CHEST: Normal.     HEPATOBILIARY: Segment 8 ablation site shows hypoenhancement and is  larger since the prior exam measuring 2.5 x 2 cm, previously 1.6 x 1.2  cm, series 8 image 45. Extending superiorly from this region is an  extracapsular multinodular opacity again noted. In transverse plane  this is approximately 4.0 x 2.6 cm, previously 5.2 x 2.8 cm when  measuring in a similar fashion as on the prior exam series 8 image 34.  Oblique craniocaudal length is 5.1 cm, previously 6.2 cm, series 9  image 38.     Segment 8 complex hypodense nodules are stable in size  measuring 1.8  cm and 0.9 cm, series 8 image 32.     Segment 7 slightly larger complex hypodense nodule measuring 1.9 cm,  previously 1.3 cm, series 8 image 47.     Segment 2 0.8 cm nodule is newly seen series 8 image 35. Another tiny  new example is 0.5 cm right inferior liver at segment 5, series 8  image 81.     There are other examples that appears similar to prior.     PANCREAS: Lobulated hypodensity at the pancreas neck is stable  measuring 1.4 cm series 6 image 70. No new pancreas abnormality.     SPLEEN: Normal.     ADRENAL GLANDS: Normal.     KIDNEYS/BLADDER: Several bilateral intrarenal stones again noted. No  hydronephrosis. These could also represent areas of medullary  calcification. No new renal abnormality. The bladder appears  unremarkable.     BOWEL: No obstruction or acute inflammation.     PELVIC ORGANS: Stable small fat at the left inguinal canal. No new  pelvic mass.     ADDITIONAL FINDINGS: Lymph nodes remain small. No new adenopathy  identified.     MUSCULOSKELETAL: Degenerative changes and scoliotic curvature of the  spine. No aggressive process.                                                                      IMPRESSION:   1.  Multiple nodular observations within the liver. Previously noted  ablation cavity at segment 8 measures larger compared to 1/9/2024.  Conversely, the adjacent soft tissue abutting the hepatic capsule  measures smaller.  2.  Other several hepatic nodular lesions suggesting metastatic  disease again identified. Slightly larger lesions at segment 7, and  newly visualized lesions at segment 2 and segment 5 are noted. As  such, new areas of metastatic disease are possible. There are also  stable lesions.  3.  Stable hypodense lesion at the pancreas neck that may be further  evaluated with pancreas MRI.

## 2024-01-31 NOTE — DISCHARGE SUMMARY
"[facility]  Hospitalist Discharge Summary      Date of Admission:  1/26/2024  Date of Discharge:  1/31/2024  6:27 PM  Discharging Provider: Sridhar Sainz DO  Discharge Service: Hospitalist Service    Discharge Diagnoses        Septic shock due to liver abscess due to Enterobacter hirae  Lactic acidosis  SURESH on CKD stage III  Hyponatremia  Suspected drug rash due to levofloxacin  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  Recent cholangitis s/p ERCP with sphincterotomy and stent placement 10/16/23, stent exchange 1/15/24  CAD s/p PCI to LAD >20 years ago  HTN   Hypothyroid  Prostate cancer s/p prostatectomy, in remission  Urinary Retention  Left upper extremity DVT, likely due to PICC in setting of malignancy           Clinically Significant Risk Factors     # Obesity: Estimated body mass index is 31.64 kg/m  as calculated from the following:    Height as of 1/25/24: 1.727 m (5' 8\").    Weight as of this encounter: 94.4 kg (208 lb 1.6 oz).       Follow-ups Needed After Discharge   Follow-up Appointments     Follow-up and recommended labs and tests       Follow up with primary care provider, Monico Rivers, within 1-2   weeks, for hospital follow- up. The following labs/tests are recommended:   CBC and BMP.  Follow up with Hem/onc and ID as planned next week            Unresulted Labs Ordered in the Past 30 Days of this Admission       No orders found from 12/27/2023 to 1/27/2024.          Discharge Disposition   Discharged to home  Condition at discharge: Stable    Hospital Course   Quan Murphy is a 73 year old male admitted on 1/26/2024.  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     Recent complex medical history with multiple hospitalizations and changes to " antibiotic plan as detailed in admission HPI.     Septic shock due to liver abscess due to Enterobacter hirae  Lactic acidosis  *abscess initially noted on imaging in late October but was without infectious symptoms, underwent biopsy (drain placement not possible) with positive culture in December  *has been off antibiotics for liver abscess since 1/21  *developed fever in the 24 hours prior to arrival, on admission febrile (100.4), tachycardic (107), hypotensive (79/56) with leukocytosis (12.5) and lactate 2.5  *pressures normalized with 4L IVF and remain stable  *CT abd/pelvis showing slight increase in size of liver abscess; CXR negative  Lactate 2.4 now normalized .,  Plan:  - continue vanco   - ID following -> Likely PICC tomorrow for IV Vancomycin at discharge   - blood cultures not drawn in ED, will obtain if spikes fever again  - SW consulted for home infusion  - US abdomen 01/30 -> No recurrent hepatic abscess. Several small hepatic metastases again seen.     SURESH on CKD stage III  *baseline Cr 0.8-1.0; admission Cr 1.58 but trending up on repeat to 2.0, likely due to sepsis  - Improving, but will hold off IVF today due to significant edema  - Cr improving, 1.68 -> 1.52 today     Hyponatremia  *admission Na 131 -> 128 with IVF  Plan:  - hold further IVF  - BMP in AM -> Na now normalized at 142     Suspected drug rash due to levofloxacin  *developed after starting levofloxacin, evaluated by Derm and felt to be drug rash - now slowly improving off levo per patient  - continue PTA atarax  - monitor  - Benadryl as needed  - Prednisone added, will continue as rash now improving     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  *previously undergone chemo and radiation  *alk phos and AST mildly elevated at admission, stable from prior  - anticipate any treatment will be on hold pending resolution of infection     Recent cholangitis s/p ERCP with sphincterotomy and stent placement 10/16/23, stent exchange  1/15/24  - follow up outpatient     CAD s/p PCI to LAD >20 years ago  HTN  - Resume PTA metoprolol   - continue PTA aspirin     Hypothyroid  - continue PTA levothyroxine     Prostate cancer s/p prostatectomy, in remission  Urinary Retention  Noted  Urology was consulted for retention -> Due to the degree of bladder distention, I recommend that the catheter remain in place for 2 weeks before removal and trial of void.      Left upper extremity DVT  On day of discharge noted to have swelling in the left arm.  US was obtained and showed extensive DVT  - Discharged on Lovenox due to malignancy history.  Has close follow up with hem/onc and they can change if warranted          Consultations This Hospital Stay   INFECTIOUS DISEASES IP CONSULT  PHARMACY TO DOSE VANCO  VASCULAR ACCESS ADULT IP CONSULT  UROLOGY IP CONSULT  CARE MANAGEMENT / SOCIAL WORK IP CONSULT  SPIRITUAL HEALTH SERVICES IP CONSULT  VASCULAR ACCESS ADULT IP CONSULT  INTERVENTIONAL RADIOLOGY ADULT/PEDS IP CONSULT  VASCULAR ACCESS ADULT IP CONSULT    Code Status   Full Code    Time Spent on this Encounter   ISridhar DO, personally saw the patient today and spent greater than 30 minutes discharging this patient.       Sridhar Sainz DO  [place] [address] ONCOLOGY  [facility] [address]  [facility] [address]  Phone: [phone]  ______________________________________________________________________    Physical Exam   Vital Signs: Temp: 97.3  F (36.3  C) Temp src: Oral BP: 129/60 Pulse: 73   Resp: 16 SpO2: 100 % O2 Device: None (Room air)    Weight: 208 lbs 1.6 oz  General Appearance: Resting comfortably. NAD  Respiratory: Clear to auscultation.  No respiratory distress  Cardiovascular: RRR. No obvious murmusr  GI: Soft.  Non-distended  Skin: Diffuse erythema related to drug rash.  No cyanosis  Other: Alert.  Left upper extremity is swollen.  Also minimal swelling to bilateral feet and right hand noted        Primary Care  Physician   Monico Rivers    Discharge Orders      Home Infusion Referral      Reason for your hospital stay    Liver abscess, drug reaction, left upper extremity DVT     Follow-up and recommended labs and tests     Follow up with primary care provider, Monico Rivers, within 1-2 weeks, for hospital follow- up. The following labs/tests are recommended: CBC and BMP.  Follow up with Hem/onc and ID as planned next week     Activity    Your activity upon discharge: activity as tolerated     Diet    Follow this diet upon discharge: Orders Placed This Encounter      Regular Diet Adult       Significant Results and Procedures   Most Recent 3 CBC's:  Recent Labs   Lab Test 01/31/24  0614 01/30/24  0659 01/29/24  0440   WBC 5.6 8.9 5.3   HGB 8.5* 9.9* 8.6*   MCV 83 83 84    199 145*     Most Recent 3 BMP's:  Recent Labs   Lab Test 01/31/24  0614 01/30/24  0659 01/29/24  0440    142 135   POTASSIUM 3.8 4.1 4.3   CHLORIDE 116* 113* 108*   CO2 20* 18* 15*   BUN 22.5 30.3* 38.7*   CR 1.32* 1.52* 1.68*   ANIONGAP 6* 11 12   LATRELL 8.4* 8.8 8.3*   GLC 77 89 92   ,   Results for orders placed or performed during the hospital encounter of 01/26/24   US Abdomen Limited    Narrative    ULTRASOUND ABDOMEN LIMITED  1/30/2024 2:20 PM    CLINICAL HISTORY: Liver lesions. Rule out recurrent abscess.    TECHNIQUE: Limited abdominal ultrasound.    COMPARISON: CT 1/25/2024.    FINDINGS: Ablation defect noted. There are at least five liver lesions  consistent with metastases. No hepatic abscess.      Impression    IMPRESSION:  1.  No recurrent hepatic abscess.  2.  Several small hepatic metastases again seen.    SHANE PAREDES MD         SYSTEM ID:  [id number]   US Upper Extremity Venous Duplex Left    Narrative    US UPPER EXTREMITY VENOUS DUPLEX LEFT  1/31/2024 12:20 PM     HISTORY: BILL extremity swelling    COMPARISON: None.    FINDINGS: Color Doppler and spectral waveform analysis performed.  There is extensive DVT in the  left internal jugular vein, subclavian  vein, axillary vein, and brachial veins. There is superficial venous  thrombus in the left basilic vein extending from the proximal humerus  to the antecubital fossa. There is superficial venous thrombus in the  left cephalic vein extending from its origin to the shoulder.      Impression    IMPRESSION: Extensive left upper extremity DVT and superficial venous  thrombus as above.      MALLORY MCLEAN MD         SYSTEM ID:  [id number]     *Note: Due to a large number of results and/or encounters for the requested time period, some results have not been displayed. A complete set of results can be found in Results Review.       Discharge Medications   Current Discharge Medication List        START taking these medications    Details   enoxaparin ANTICOAGULANT (LOVENOX) 80 MG/0.8ML syringe Inject 0.7 mLs (70 mg) Subcutaneous every 12 hours for 7 days  Qty: 11.2 mL, Refills: 0    Comments: Future refills by hem/onc or PCP Dr. Monico Rivers with phone number [phone].  Associated Diagnoses: Acute deep vein thrombosis (DVT) of axillary vein of left upper extremity (H)      furosemide (LASIX) 20 MG tablet Take 1 tablet (20 mg) by mouth daily  Qty: 1 tablet, Refills: 0    Associated Diagnoses: Hypervolemia, unspecified hypervolemia type      predniSONE (DELTASONE) 10 MG tablet Take 2 tablets (20 mg) by mouth daily for 3 days, THEN 1 tablet (10 mg) daily for 3 days, THEN 0.5 tablets (5 mg) daily for 3 days.  Qty: 11 tablet, Refills: 0    Associated Diagnoses: Adverse effect of drug, initial encounter      tamsulosin (FLOMAX) 0.4 MG capsule Take 1 capsule (0.4 mg) by mouth daily  Qty: 30 capsule, Refills: 0    Comments: Future refills by PCP Dr. Monico Rivers with phone number [phone].  Associated Diagnoses: Urinary retention      vancomycin (VANCOCIN) 1250 mg/250 mL IVPB Inject 1,250 mg into the vein every 24 hours for 21 days    Comments: Check weekly CBC/diff,   CRP. Check Creatinine and Vancomycin twice weekly, pharmacy to dose based on AUC  Send results to [facility] ID  Mckayla Ponce  Associated Diagnoses: Liver abscess           CONTINUE these medications which have NOT CHANGED    Details   acetaminophen (TYLENOL) 325 MG tablet Take 1-2 tablets (325-650 mg) by mouth every 6 hours as needed for mild pain  Qty: 40 tablet, Refills: 0    Associated Diagnoses: Gallstone pancreatitis      aspirin 81 MG EC tablet Take 81 mg by mouth daily      cetirizine (ZYRTEC) 10 MG tablet Take 1 tablet (10 mg) by mouth daily  Qty: 30 tablet, Refills: 1    Associated Diagnoses: Chronic maxillary sinusitis      famotidine (PEPCID) 20 MG tablet Take 20 mg by mouth 2 times daily as needed (heartburn)      hydrOXYzine HCl (ATARAX) 25 MG tablet Take 1-2 tablets (25-50 mg) by mouth 3 times daily as needed for itching  Qty: 30 tablet, Refills: 0    Associated Diagnoses: Itching      levothyroxine (SYNTHROID/LEVOTHROID) 88 MCG tablet Take 1 tablet (88 mcg) by mouth daily  Qty: 90 tablet, Refills: 1    Associated Diagnoses: Hypothyroidism due to acquired atrophy of thyroid      melatonin 5 MG tablet Take 5 mg by mouth nightly as needed for sleep      metoprolol succinate ER (TOPROL XL) 50 MG 24 hr tablet Take 0.5 tablets (25 mg) by mouth daily  Qty: 90 tablet, Refills: 3    Associated Diagnoses: Benign essential hypertension      naloxone (NARCAN) 4 MG/0.1ML nasal spray Spray 1 spray (4 mg) into one nostril alternating nostrils as needed for opioid reversal every 2-3 minutes until assistance arrives  Qty: 0.2 mL, Refills: 0    Associated Diagnoses: Osteoarthritis of right foot, unspecified osteoarthritis type; 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 If symptoms persist 5 minutes after 1st dose call 911.  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); Osteoarthritis of right foot, unspecified osteoarthritis type      ondansetron (ZOFRAN) 4 MG tablet Take 1 tablet (4 mg) by mouth every 8 hours as needed for nausea  Qty: 21 tablet, Refills: 0    Associated Diagnoses: Choledocholithiasis      oxyCODONE (ROXICODONE) 5 MG tablet TAKE ONE TO TWO TABLETS BY MOUTH EVERY 6 HOURS AS NEEDED FOR SEVERE PAIN - MAX OF 6 TABLETS PER DAY  Qty: 120 tablet, Refills: 0    Associated Diagnoses: 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); Osteoarthritis of right foot, unspecified osteoarthritis type      pregabalin (LYRICA) 150 MG capsule TAKE 1 CAPSULE (150 MG) BY MOUTH 2 TIMES DAILY FOR 30 DAYS  Qty: 60 capsule, Refills: 0    Associated Diagnoses: Other chronic postprocedural pain      rosuvastatin (CRESTOR) 40 MG tablet Take 1 tablet (40 mg) by mouth daily  Qty: 90 tablet, Refills: 3    Associated Diagnoses: Hyperlipidemia LDL goal <130      zolpidem (AMBIEN) 10 MG tablet TAKE ONE TABLET BY MOUTH EVERY NIGHT AT BEDTIME AS NEEDED FOR SLEEP  Qty: 30 tablet, Refills: 0    Associated Diagnoses: 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, insomnia type           Allergies   Allergies   Allergen Reactions    Levofloxacin Rash     Head to toe    Animal Dander     Augmentin [Amoxicillin-Pot Clavulanate] GI Disturbance    Azithromycin Nausea and Vomiting    Dust Mites     Pollen Extract     Smoke.

## 2024-01-31 NOTE — PROGRESS NOTES
Care Management Discharge Note    Discharge Date: 01/31/2024       Discharge Disposition: Home    Discharge Services:      Discharge DME:      Discharge Transportation:      Private pay costs discussed: Not applicable    Does the patient's insurance plan have a 3 day qualifying hospital stay waiver?  No    PAS Confirmation Code:    Patient/family educated on Medicare website which has current facility and service quality ratings:      Education Provided on the Discharge Plan:    Persons Notified of Discharge Plans: bedside RN  Patient/Family in Agreement with the Plan: yes    Handoff Referral Completed: Yes    Additional Information:  Per Dr. Sainz, patient will be discharging home today with [facility] for IV abx.  Per patient and spouse request, called [facility] [place] [phone] and spoke to Kristina.  Informed Kristina that pt is discharging home with IV abx and pt is requesting to have Outpatient Infusion Services to do his PICC dressings changes weekly and blood draws 2x a week while receiving home IV abx.  Kristina was able to scheduled patient for outpt PICC and blood draws.  Met with patient to discuss discharge plan of home with [facility] for IV abx and scheduled outpt PICC and lab draws.  Called [facility] Teetee Aquino RN Liaison with [facility] [phone] and informed her that pt will go to Outpatient Infusion for PICC cares and blood draws and will not need home care for this.  Per Teetee, IV abx and supplies will be delivered later tonight and that pt should be dosed at hospital prior to discharge.  Called Pharmacist and pt can receive IV abx at 1600 today.  Called pt's spouse Alessandra and discuss plan for home with [facility] and that pt will receive IV vanco dose prior to discharge.  Informed Alessandra that pt has Outpatient PICC care and blood draws scheduled at [facility].  Informed her that Urology Clinic will call pt with scheduled visit for trial void.  Discussed  PCP follow-up and Alessandra shared pt will see his Vladimir Baker on Feb. 5th and that she didn't want PCP appt scheduled.   Informed bedside RN to give Vanco dose prior to discharge and send [facility] catheter bags and supplies home with patient.   Ora Montemayor RN  Ora Montemayor RN, BSN, OCN   Inpatient Care Coordination [address]  Office: [phone]

## 2024-02-01 NOTE — TELEPHONE ENCOUNTER
Writer called and spoke with wife (c2c on file). Appt made for TOV on 2.14.24.     Anita VERMA RN   [facility], Urology   2/1/2024 10:22 AM

## 2024-02-01 NOTE — TELEPHONE ENCOUNTER
[facility] Call Center    Phone Message    May a detailed message be left on voicemail: no     Reason for Call: Other: Patients spouse is calling back regarding the message that was left on the 31st. Please call patients spouse back to schedule.     Action Taken: Other: [facility]    Travel Screening: Not Applicable

## 2024-02-01 NOTE — PROGRESS NOTES
Clinic Care Coordination Contact  [facility]: Post-Discharge Note  SITUATION                                                      Admission:    Admission Date: 01/26/24   Reason for Admission: Septic shock due to liver abscess  Discharge:   Discharge Date: 01/31/24  Discharge Diagnosis: Septic shock due to liver abscess    BACKGROUND                                                      Per hospital discharge summary and inpatient provider notes:    Assessment & Plan  Quan Murphy is a 73 year old male admitted on 1/26/2024.  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     Recent complex medical history with multiple hospitalizations and changes to antibiotic plan as detailed in admission HPI.     Septic shock due to liver abscess due to Enterobacter hirae  Lactic acidosis  *abscess initially noted on imaging in late October but was without infectious symptoms, underwent biopsy (drain placement not possible) with positive culture in December  *has been off antibiotics for liver abscess since 1/21  *developed fever in the 24 hours prior to arrival, on admission febrile (100.4), tachycardic (107), hypotensive (79/56) with leukocytosis (12.5) and lactate 2.5  *pressures normalized with 4L IVF and remain stable  *CT abd/pelvis showing slight increase in size of liver abscess; CXR negative  Lactate 2.4 now normalized .,  Plan:  - continue vanco   - ID following -> Likely PICC tomorrow for IV Vancomycin at discharge   - blood cultures not drawn in ED, will obtain if spikes fever again  - SW consulted for home infusion  - US abdomen 01/30 -> No recurrent hepatic abscess. Several small hepatic metastases again seen.     SURESH on CKD stage III  *baseline Cr 0.8-1.0; admission Cr 1.58 but trending up on  repeat to 2.0, likely due to sepsis  - Improving, but will hold off IVF today due to significant edema  - Cr improving, 1.68 -> 1.52 today     Hyponatremia  *admission Na 131 -> 128 with IVF  Plan:  - hold further IVF  - BMP in AM -> Na now normalized at 142     Suspected drug rash due to levofloxacin  *developed after starting levofloxacin, evaluated by Derm and felt to be drug rash - now slowly improving off levo per patient  - continue PTA atarax  - monitor  - Benadryl as needed  - Prednisone added, will continue as rash now improving     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  *previously undergone chemo and radiation  *alk phos and AST mildly elevated at admission, stable from prior  - anticipate any treatment will be on hold pending resolution of infection     Recent cholangitis s/p ERCP with sphincterotomy and stent placement 10/16/23, stent exchange 1/15/24  - follow up outpatient     CAD s/p PCI to LAD >20 years ago  HTN  - Resume PTA metoprolol   - continue PTA aspirin     Hypothyroid  - continue PTA levothyroxine     Prostate cancer s/p prostatectomy, in remission  Urinary Retention  Noted  Urology was consulted for retention -> Due to the degree of bladder distention, I recommend that the catheter remain in place for 2 weeks before removal and trial of void.     ASSESSMENT           Discharge Assessment  How are you doing now that you are home?: per patient's wife report, things are so so. she said they are having issues with patient's dressing changes and blood work. wife said the patient is scheduled 3 times a week. she said previous, the patient only went in once a week. patient said she did leave a message with the infusion nurses to see if they could get it figured out. wife said they have all medications.  How are your symptoms? (Red Flag symptoms escalate to triage hotline per guidelines): Improved  Do you feel your condition is stable enough to be safe at home until your provider visit?:  Yes  Does the patient have their discharge instructions? : Yes  Does the patient have questions regarding their discharge instructions? : No  Were you started on any new medications or were there changes to any of your previous medications? : Yes  Does the patient have all of their medications?: Yes  Do you have questions regarding any of your medications? : No  Do you have all of your needed medical supplies or equipment (DME)?  (i.e. oxygen tank, CPAP, cane, etc.): Yes  Discharge follow-up appointment scheduled within 14 calendar days? : Yes  Discharge Follow Up Appointment Date: 02/21/24  Discharge Follow Up Appointment Scheduled with?: Primary Care Provider         Post-op (Clinicians Only)  Did the patient have surgery or a procedure: No  Fever: No  Chills: No  Eating & Drinking: eating and drinking without complaints/concerns  PO Intake: low fat diet  Urinary Status: indwelling urinary catheter      PLAN                                                      Outpatient Plan:      Follow-ups Needed After Discharge  Follow-up Appointments     Follow-up and recommended labs and tests       Follow up with primary care provider, Monico Rivers, within 1-2   weeks, for hospital follow- up. The following labs/tests are recommended:   CBC and BMP.  Follow up with Hem/onc and ID as planned next week    Future Appointments   Date Time Provider Department Center   2/6/2024  3:00 PM PH INFUSION CHAIR [facility]   2/9/2024 10:30 AM PH INFUSION NURSE [facility]   2/13/2024  3:00 PM PH INFUSION NURSE [facility]   2/14/2024  8:45 AM David Rogers MD [facility]   2/16/2024 10:30 AM PH INFUSION CHAIR 9 [facility]   2/20/2024  3:00 PM PH INFUSION NURSE [facility]   2/21/2024 10:00 AM Monico Rivers MD [facility]   2/23/2024  8:30 AM PH INFUSION NURSE [facility]   2/27/2024  3:00 PM PH INFUSION NURSE [facility]   3/5/2024  3:00 PM PH INFUSION  CHAIR [facility]         For any urgent concerns, please contact our 24 hour nurse triage line: [phone] ([phone])       RN CC spoke to patient's wife. She reports a lot going on. She took down RN CC's contact information in case she needs anything. Wife would like to bring patient to infusion on Tuesdays for dressing changes and blood work. She does have a message out to infusion and will wait to hear back from them. Wife said the patient works with [employer], and the [employer]. She said appointments get hard with 3 different places to manage. RN CC will plan to follow up with patient and wife next week to see how things are going and if they want to complete care coordination enrollment. Wife is in agreement with the plan.     Rossana Moore RN

## 2024-02-01 NOTE — TELEPHONE ENCOUNTER
Reason for Call:  Other appointment    Detailed comments: New has an appointment on 2/14/24 in [place] with Dr Rogers, he currently has a catheter that is really bothering him, his spouse states he's ready to pull it out. New would like to know if he can be worked into Dr Rogers's schedule asap.    Phone Number Patient can be reached at: Cell number on file:    Telephone Information:   Mobile [phone]       Best Time: anytime    Can we leave a detailed message on this number? YES    Call taken on 2/1/2024 at 2:48 PM by Marcelina Hernandez

## 2024-02-05 PROBLEM — S30.1XXA RECTUS SHEATH HEMATOMA, INITIAL ENCOUNTER: Status: ACTIVE | Noted: 2024-01-01

## 2024-02-05 PROBLEM — I82.A12 ACUTE DEEP VEIN THROMBOSIS (DVT) OF AXILLARY VEIN OF LEFT UPPER EXTREMITY (H): Status: ACTIVE | Noted: 2024-01-01

## 2024-02-05 PROBLEM — D62 ANEMIA DUE TO BLOOD LOSS, ACUTE: Status: ACTIVE | Noted: 2024-01-01

## 2024-02-05 NOTE — PROGRESS NOTES
S: Patient registered to Observation. Patient arrived to room 251 via cart from ED    B: LLQ abdominal pain.     A: Pain in LLQ, IV dilaudid given. Denies nausea. Alert and oriented, lungs clear. Heparin gtt started.     R: Orders and observation goals reviewed with patient    Nursing Observation criteria listed below was met:    Skin issues/needs documented:Yes  Isolation needs addressed and Signage up: NA  Fall Prevention: Education given and documented: Yes  Education Assessment documented:Yes  Admission Education Documented: Yes  New medication patient education completed and documented (Possible Side Effects of Common Medications handout): Yes  OBS video/handout Reviewed & Documented: Yes  Allergies Reviewed: Yes  Medication Reconciliation Complete: Yes  Home medications if not able to send immediately home with family stored here: NA  Reminder note placed in discharge instructions of home meds: NA  Patient has discharge needs (If yes, please explain): No  Patient discharge preferences addressed and charted on white board:  Yes  Provider notified that patient has arrived to the unit: Yes

## 2024-02-05 NOTE — H&P
[facility]    History and Physical - Hospitalist Service       Date of Admission:  2/5/2024    Assessment & Plan      Quan Murphy is a 73 year old male admitted on 2/5/2024.    Recent hospitalization at [facility]1/26-1/31. Quan GANT.   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 *CT abd/pelvis showing slight increase in size of liver abscess; CXR negative. continue vanco. US abdomen 01/30 -> No recurrent hepatic abscess. Several small hepatic metastases again seen. Per information from patient's wife, vancomycin to continue till 3 weeks post discharge on 1/31. Patient also had a drug rash from levaquin at the hospital and is on prednisone taper and benadryl prn, atarax. He also had PICC line - DVT during last hospitalization, lovenox every 12 hrs.          Current hospitalization at [facility] :       Patient has been admitted with Abdominal pain around left lower quadrant - 3 days ago, 8/10 in intensity, constant pain, increase with activity. Also has poor apetite. Patient subsequently came to the ER for further evaluation and management.       CT abdomen : 1.  Moderate size left rectus sheath hematoma with concern for active  extravasation into the hematoma. 2.  Left subclavian and axillary vein thrombosis, better assessed by the concomitant venous ultrasound of the left upper extremity. 3.  Trace right and small left pleural effusion and bibasilar atelectasis are new. 4.  Interval progression of hepatic metastases with increased size and  number of hepatic metastases. Decreased size of the ablation zone in the right lobe of the dome. 5.  Slight worsening of right seventh rib and T4 osseous metastases. 6.  Stable 1.5 cm heterogeneous area in the pancreatic neck. Attention  on  follow-up.    ER MD who checked out the patient to me had discussed with IR Teams with [address] [place] [address] at [facility] and [facility] and both team suggested conservative management with no IR intervention. Will switch to lovenox to heparin infusion. Hb has been stable. Repeat check at 6:30 pm also showed stable hb. Plan is to use abdominal binder, monitor hb, transfuse prn to keep Hb 7-8. In case patient becomes hemodynamically unstable and if hb drops, will have no other option but to stop heparin infusion, and transfer to higher level of care with IR facility to stop bleeding, also will continue to use abdominal binder..      A/p :       Acute Abdominal pain  Moderate size left rectus sheath hematoma with concern for active extravasation into the hematoma.  H/o Left subclavian and axillary vein thrombosis  Anemia : hb stable at 8.5    Patient has been admitted with Abdominal pain around left lower quadrant - 3 days ago, 8/10 in intensity, constant pain, increase with activity. Also has poor apetite. Patient subsequently came to the ER for further evaluation and management.       CT abdomen : 1.  Moderate size left rectus sheath hematoma with concern for active  extravasation into the hematoma. 2.  Left subclavian and axillary vein thrombosis, better assessed by the concomitant venous ultrasound of the left upper extremity. 3.  Trace right and small left pleural effusion and bibasilar atelectasis are new. 4.  Interval progression of hepatic metastases with increased size and  number of hepatic metastases. Decreased size of the ablation zone in the right lobe of the dome. 5.  Slight worsening of right seventh rib and T4 osseous metastases. 6.  Stable 1.5 cm heterogeneous area in the pancreatic neck. Attention  on follow-up.    ER MD who checked out the patient to me had discussed with IR Teams with [address] [place] [address] at [facility] and [facility] and both team suggested conservative management with no IR intervention. Will switch to lovenox  to heparin infusion. Hb has been stable. Repeat check at 6:30 pm also showed stable hb. Plan is to use abdominal binder, monitor hb, transfuse prn to keep Hb 7-8. In case patient becomes hemodynamically unstable and if hb drops, will have no other option but to stop heparin infusion, and transfer to higher level of care with IR facility to stop bleeding, also will continue to use abdominal binder.    In case of any hemodynamic instability and significant drop in hb, will need to transfer to higher level of care.      Hypokalemia : supplement prn        [facility].  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 *CT abd/pelvis showing slight increase in size of liver abscess; CXR negative. continue vanco. US abdomen 01/30 -> No recurrent hepatic abscess. Several small hepatic metastases again seen. Per information from patient's wife, vancomycin to continue till 3 weeks post discharge on 1/31. Patient also had a drug rash from levaquin at the hospital and is on prednisone taper and benadryl prn, atarax. He also had PICC line - DVT during last hospitalization, lovenox every 12 hrs.     Metastatic cancer of oropharynx - seen at [place], diagnosed in 2023, s/p chemo, follows up with oncology, chemo scheduled for 27th Feb    PICC line associated DVT during last hospitalization, lovenox every 12 hrs - switched to heparin infusion.    Metastatic cancer of oropharynx - seen at [place], diagnosed in 2023, s/p chemo, follows up with oncology     Urinary retention : S/p Stone since 1/28 for urinary retention : follow up with urology on Feb 14  CAD s/p stent in LAD : on baby aspirin, bb, statin  S/p Rt. Iliac arter : on baby aspirin  GERD: on pepcid  Itching :  "on hydroxyzine 25-50 mg tid prn for itching  HTN, Essential : on metoprolol 25 mg daily  Chronic pain : on oxycodone 5-10 mg every 6 hourly prn  Neuropathy : on lyrica 150 mg bid   HLD : on statin  BPH : on flomax  Recent cholangitis s/p ERCP with sphincterotomy and stent placement 10/16/23, stent exchange 1/15/24  - follow up outpatient  Hypothyroid  - continue PTA levothyroxine  Abnormal LFT's : monitor               Observation Goals: List all goals to be met before discharge home  ,  ~Diagnostic tests and consults completed and ,     resulted., ~Vital signs at baseline., ~Tolerating oral intake., ~Safe discharge environment has been,    identified by care management., ~Adequate pain control on oral analgesia., ~Ability to ambulate is at baseline., ~Tolerating oral antibiotics without worsening,    of infection., ~O2 sats at baseline or greater than 93% on ,         room air., Nurse to notify Provider when observation goals have been met and patient is ready for discharge.  Diet: Regular Diet Adult    DVT Prophylaxis: heparin infusion  Stone Catheter: Not present  Lines: PRESENT             Cardiac Monitoring: ACTIVE order. Indication: While on heparin  Code Status:  full    Clinically Significant Risk Factors Present on Admission              # Hypoalbuminemia: Lowest albumin = 2.8 g/dL at 2/5/2024  8:22 AM, will monitor as appropriate  # Drug Induced Coagulation Defect: home medication list includes an anticoagulant medication  # Drug Induced Platelet Defect: home medication list includes an antiplatelet medication   # Hypertension: Noted on problem list      # Obesity: Estimated body mass index is 30.11 kg/m  as calculated from the following:    Height as of this encounter: 1.727 m (5' 8\").    Weight as of this encounter: 89.8 kg (198 lb).       # Financial/Environmental Concerns:           Disposition Plan      Expected Discharge Date: 02/06/2024                  Jame Rich MD  Hospitalist Service  M " [facility]  Securely message with [employer] (more info)  Text page via [facility] Paging/Directory     ______________________________________________________________________    Chief Complaint   Abdominal pain    History is obtained from the patient    History of Present Illness         Quan Murphy is a 73 year old male admitted on 2/5/2024.    [facility] hospitalization at [facility]. Quan GANT.   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 *CT abd/pelvis showing slight increase in size of liver abscess; CXR negative. continue vanco. US abdomen 01/30 -> No recurrent hepatic abscess. Several small hepatic metastases again seen. Per information from patient's wife, vancomycin to continue till 3 weeks post discharge on 1/31. Patient also had a drug rash from levaquin at the hospital and is on prednisone taper and benadryl prn, atarax. He also had PICC line - DVT during last hospitalization, lovenox every 12 hrs.          Current hospitalization at [facility] :       Patient has been admitted with Abdominal pain around left lower quadrant - 3 days ago, 8/10 in intensity, constant pain, increase with activity. Also has poor apetite. Patient subsequently came to the ER for further evaluation and management.       CT abdomen : 1.  Moderate size left rectus sheath hematoma with concern for active  extravasation into the hematoma. 2.  Left subclavian and axillary vein thrombosis, better assessed by the concomitant venous ultrasound of the left upper extremity. 3.  Trace right and small left pleural effusion and bibasilar atelectasis are new. 4.  Interval progression of hepatic metastases with increased size and  number of hepatic metastases. Decreased size of the ablation zone in the right  lobe of the dome. 5.  Slight worsening of right seventh rib and T4 osseous metastases. 6.  Stable 1.5 cm heterogeneous area in the pancreatic neck. Attention  on follow-up.    ER MD who checked out the patient to me had discussed with IR Teams with 2 hospital at [facility] and [facility] and both team suggested conservative management with no IR intervention. Will switch to lovenox to heparin infusion. Hb has been stable. Repeat check at 6:30 pm also showed stable hb. Plan is to use abdominal binder, monitor hb, transfuse prn to keep Hb 7-8. In case patient becomes hemodynamically unstable and if hb drops, will have no other option but to stop heparin infusion, and transfer to higher level of care with IR facility to stop bleeding, also will continue to use abdominal binder..      Lives in [facility] in [place]  , 2 children  Denies smoking, occasional alcohol, drugs  Functionally independent  retired    Past Medical History    Past Medical History:   Diagnosis Date    Allergic rhinitis     Allergy, unspecified not elsewhere classified     Seasonal allergies, pollen, dust, smoke and animals    Antiplatelet or antithrombotic long-term use     Anxiety     Arthritis     Chest pain     Chronic sinusitis     Coronary atherosclerosis of unspecified type of vessel, native or graft     Coronary artery disease    Depressive disorder 1995    Gastroesophageal reflux disease 2020    Cleared with medication    Head injury 1954    Hiatal hernia 2015    Right Side    History of blood transfusion 12/15/2004    Prostate Surgery - My own blood    Hyperlipidemia     Hypertension     Inguinal hernia     Kidney problem 10/08/2017    Lithotripsy    Kidney stones     Malignant neoplasm of prostate (H)     Prostate cancer    Nausea with vomiting 11/03/2023    Prostate cancer (H)     Syncope     Thyroid disease     Tonsil cancer (H)        Past Surgical History   Past Surgical History:   Procedure Laterality Date    ABDOMEN SURGERY       ARTHRODESIS FOOT  07/23/2013    Procedure: ARTHRODESIS FOOT;  Great Toe Arthrodesis Left Foot;  Surgeon: Ash Gonzalez DPM;  Location: [facility] OR    ARTHRODESIS FOOT  06/10/2014    Procedure: ARTHRODESIS FOOT;  Surgeon: Ash Gonzalez DPM;  Location: [facility] OR    BIOPSY  10/01/2004    dermatologist biopsies    COLONOSCOPY  10/07/2013    Procedure: COLONOSCOPY;  Colonoscopy;  Surgeon: Mike Fallon MD;  Location: [facility] GI    CYSTOSCOPY N/A 02/16/2022    Procedure: CYSTOSCOPY and bladder stone removal;  Surgeon: David Rogers MD;  Location: [facility] OR    ENDOSCOPIC RETROGRADE CHOLANGIOPANCREATOGRAM N/A 1/15/2024    Procedure: ENDOSCOPIC RETROGRADE CHOLANGIOPANCREATOGRAPHY, WITH stent removal;  Surgeon: Trent Blood MD;  Location:  OR    ENDOSCOPIC RETROGRADE CHOLANGIOPANCREATOGRAM COMPLEX N/A 10/16/2023    Procedure: ENDOSCOPIC RETROGRADE CHOLANGIOPANCREATOGRAPHY, COMPLEX;  Surgeon: Trent Blood MD;  Location:  OR    ENDOSCOPIC ULTRASOUND UPPER GASTROINTESTINAL TRACT (GI) N/A 10/16/2023    Procedure: Endoscopic ultrasound upper gastrointestinal tract (GI);  Surgeon: Trent Blood MD;  Location:  OR    ENT SURGERY      ESOPHAGOSCOPY, GASTROSCOPY, DUODENOSCOPY (EGD), COMBINED N/A 02/12/2020    Procedure: ESOPHAGOGASTRODUODENOSCOPY (EGD);  Surgeon: Sam Escobar MD;  Location:  GI    EXTRACORPOREAL SHOCK WAVE LITHOTRIPSY (ESWL) Bilateral 10/18/2017    Procedure: EXTRACORPOREAL SHOCK WAVE LITHOTRIPSY (ESWL);  BILATERAL EXTRACORPOREAL SHOCKWAVE LITHOTRIPSY ;  Surgeon: Meir Torres MD;  Location: [facility] OR    HC CORRECT BUNION,SIMPLE  08/11/2005    x3    HC REMV TOE BENIGN BONE LESN  08/11/2005    HEAD & NECK SURGERY  1954    From a fall    HERNIORRHAPHY INGUINAL  07/03/2013    Procedure: HERNIORRHAPHY INGUINAL;  Open Repair Inguinal hernia Right with mesh ;  Surgeon: Sam Escobar MD;  Location:  OR    IR GASTROSTOMY TUBE PERCUTANEOUS PLCMNT  06/07/2021     LAPAROSCOPIC CHOLECYSTECTOMY N/A 10/18/2023    Procedure: CHOLECYSTECTOMY, LAPAROSCOPIC;  Surgeon: Dannie Warner MD;  Location: [facility] OR    MOHS MICROGRAPHIC PROCEDURE  08/23/2011    ear and chin-[facility]    OPEN REDUCTION INTERNAL FIXATION WRIST Right 07/18/2017    Procedure: OPEN REDUCTION INTERNAL FIXATION WRIST;  Right distal radius open reduction and internal fixation;  Surgeon: Pedro Blanca DO;  Location: [facility] OR    RECONSTRUCT FOREFOOT WITH METATARSOPHALANGEAL (MTP) FUSION  06/10/2014    Procedure: RECONSTRUCT FOREFOOT WITH METATARSOPHALANGEAL (MTP) FUSION;  Surgeon: Ash Gonzalez DPM;  Location: [facility] OR    STENT, CORONARY, DEMI      SURGICAL HISTORY OF -   1999/1974    lt knee    SURGICAL HISTORY OF -   10/2004    lithotripsy    SURGICAL HISTORY OF -   11/2005    angiogram with stent    VASCULAR SURGERY  11/17/2005    Puncture of iliac artery during and angiogram    [facility] LAPAROSCOPY, SURGICAL PROSTATECTOMY, RETROPUBIC RADICAL, W/NERVE SPARING  11/30/2004    With full bilateral pelvic lymphadenectomy.  -[facility].    [facility] TOTAL KNEE ARTHROPLASTY  05/01/2008    Left knee       Prior to Admission Medications   Prior to Admission Medications   Prescriptions Last Dose Informant Patient Reported? Taking?   acetaminophen (TYLENOL) 325 MG tablet 2/5/2024 at 0400 Spouse/Significant Other No Yes   Sig: Take 1-2 tablets (325-650 mg) by mouth every 6 hours as needed for mild pain   aspirin 81 MG EC tablet 2/5/2024 at am Spouse/Significant Other Yes Yes   Sig: Take 81 mg by mouth daily   cetirizine (ZYRTEC) 10 MG tablet Past Week at am Spouse/Significant Other No Yes   Sig: Take 1 tablet (10 mg) by mouth daily   Patient taking differently: Take 10 mg by mouth daily as needed for rhinitis   enoxaparin ANTICOAGULANT (LOVENOX) 80 MG/0.8ML syringe 2/5/2024 at 0600 Spouse/Significant Other No Yes   Sig: Inject 0.7 mLs (70 mg) Subcutaneous every 12 hours for 7 days   Patient taking differently: Inject  0.75 mg/kg Subcutaneous every 12 hours 6 AM and 6 PM   famotidine (PEPCID) 20 MG tablet 2/4/2024 at am Spouse/Significant Other Yes Yes   Sig: Take 20 mg by mouth 2 times daily as needed (heartburn)   hydrOXYzine HCl (ATARAX) 25 MG tablet 2/5/2024 at am Spouse/Significant Other No Yes   Sig: Take 1-2 tablets (25-50 mg) by mouth 3 times daily as needed for itching   levothyroxine (SYNTHROID/LEVOTHROID) 88 MCG tablet 2/5/2024 at am Spouse/Significant Other No Yes   Sig: Take 1 tablet (88 mcg) by mouth daily   melatonin 5 MG tablet Past Week at hs Spouse/Significant Other Yes Yes   Sig: Take 5 mg by mouth nightly as needed for sleep   metoprolol succinate ER (TOPROL XL) 50 MG 24 hr tablet 2/5/2024 at am Spouse/Significant Other No Yes   Sig: Take 0.5 tablets (25 mg) by mouth daily   naloxone (NARCAN) 4 MG/0.1ML nasal spray PRN at on hand Spouse/Significant Other No No   Sig: Spray 1 spray (4 mg) into one nostril alternating nostrils as needed for opioid reversal every 2-3 minutes until assistance arrives   nitroGLYcerin (NITROSTAT) 0.4 MG sublingual tablet PRN at on hand Spouse/Significant Other No No   Sig: For chest pain place 1 tablet under the tongue every 5 minutes for 3 doses. If symptoms persist 5 minutes after 1st dose call 911.   ondansetron (ZOFRAN ODT) 4 MG ODT tab Not Taking Spouse/Significant Other No No   Sig: DISSOLVE ONE TABLET BY MOUTH EVERY 8 HOURS AS NEEDED FOR NAUSEA   Patient not taking: Reported on 2/5/2024   ondansetron (ZOFRAN) 4 MG tablet Not Taking Spouse/Significant Other No No   Sig: Take 1 tablet (4 mg) by mouth every 8 hours as needed for nausea   Patient not taking: Reported on 2/5/2024   oxyCODONE (ROXICODONE) 5 MG tablet 2/5/2024 at 0400 Spouse/Significant Other No Yes   Sig: TAKE ONE TO TWO TABLETS BY MOUTH EVERY 6 HOURS AS NEEDED FOR SEVERE PAIN - MAX OF 6 TABLETS PER DAY   Patient taking differently: Take 5-10 mg by mouth every 6 hours as needed for severe pain MAX OF 6 TABLETS PER  DAY   predniSONE (DELTASONE) 10 MG tablet 2/5/2024 at am Spouse/Significant Other No Yes   Sig: Take 2 tablets (20 mg) by mouth daily for 3 days, THEN 1 tablet (10 mg) daily for 3 days, THEN 0.5 tablets (5 mg) daily for 3 days.   pregabalin (LYRICA) 150 MG capsule 2/5/2024 at am Spouse/Significant Other No Yes   Sig: TAKE 1 CAPSULE (150 MG) BY MOUTH 2 TIMES DAILY FOR 30 DAYS   rosuvastatin (CRESTOR) 40 MG tablet 2/5/2024 at am Spouse/Significant Other No Yes   Sig: Take 1 tablet (40 mg) by mouth daily   tamsulosin (FLOMAX) 0.4 MG capsule 2/5/2024 at am Spouse/Significant Other No Yes   Sig: Take 1 capsule (0.4 mg) by mouth daily   vancomycin (VANCOCIN) 1250 mg/250 mL IVPB 2/4/2024 at 1600 Spouse/Significant Other No Yes   Sig: Inject 1,250 mg into the vein every 24 hours for 21 days   Patient taking differently: Inject 1,250 mg into the vein every 24 hours 4 PM   zolpidem (AMBIEN) 10 MG tablet 2/4/2024 at hs Spouse/Significant Other No Yes   Sig: TAKE ONE TABLET BY MOUTH EVERY NIGHT AT BEDTIME AS NEEDED FOR SLEEP      Facility-Administered Medications: None        Review of Systems         No fever or chills  No nausea, vomiting, diarrhea  No urinary symptoms    Physical Exam   Vital Signs: Temp: 97.9  F (36.6  C) Temp src: Oral BP: (!) 159/66 Pulse: 69   Resp: 20 SpO2: 100 % O2 Device: None (Room air)    Weight: 198 lbs 0 oz       GENERAL: The patient is not in any acute distressed. Awake and alert.  HEENT: Nonicteric sclerae, PERRLA, EOMI. Oropharynx clear. Moist mucous membranes. Conjunctivae appear well perfused.  HEART: Regular rate and rhythm without murmurs.  LUNGS: Clear to auscultation bilaterally. No wheezing or crackles.  ABDOMEN: Soft, positive bowel sounds, tender.  SKIN: No rash, no excessive bruising, petechiae, or purpura.  EXTREMITIES : no rashes, no swelling in legs.  NEUROLOGIC: conscious and oriented, follows commands, no obvious focal deficits.  ROS: All other systems negative       Medical  Decision Making       76 MINUTES SPENT BY ME on the date of service doing chart review, history, exam, documentation & further activities per the note.  MANAGEMENT DISCUSSED with the following over the past 24 hours: rn, patient       Data     I have personally reviewed the following data over the past 24 hrs:    5.3  \   8.5 (L); 8.6 (L)   / 157     142 114 (H) 12.7 /  91   3.4 19 (L) 0.97 \     ALT: 155 (H) AST: 183 (H) AP: 244 (H) TBILI: 0.3   ALB: 2.8 (L) TOT PROTEIN: 5.4 (L) LIPASE: N/A     Procal: N/A CRP: N/A Lactic Acid: 0.9

## 2024-02-05 NOTE — ED NOTES
ED Nursing criteria listed below was addressed during verbal handoff:     Abnormal vitals: No  Abnormal results: Yes, CT scan  Med Reconciliation completed: Yes  Meds given in ED: Yes, Ativan 1 mg, 3 doses dilaudid, 1 home dose vanco  Any Overdue Meds: Yes  Core Measures: na  Isolation: No  Special needs: No  Skin assessment: No    Observation Patient  Education provided: Yes

## 2024-02-05 NOTE — ED TRIAGE NOTES
He has L lower quadrant abdominal pain for the past 3 days. He denies nausea and is having loose to normal BM's.  He also was recently sent to [facility] for sepsis and had a severe allergic reaction to lovofloxicin.

## 2024-02-05 NOTE — MEDICATION SCRIBE - ADMISSION MEDICATION HISTORY
Medication Scribe Admission Medication History    Admission medication history is complete. The information provided in this note is only as accurate as the sources available at the time of the update.    Information Source(s): Family, spouse: Alessandra via in-person    Pertinent Information: Patient's spouse emphasizes that Lovenox injections MUST be administered at 0600 AM and 1800 PM every day for accurate trough. Vancomycin infusion must be administered at 1600 every single day as well.     Ondansetron changed to not taking; unsure there is any left at home on hand, and not sure which one they have.     Changes made to PTA medication list:  Added: None  Deleted: Furosemide 20 mg; one time, completed therapy   Changed: None     Medication Affordability:       Allergies reviewed with patient and updates made in EHR: yes    Medication History Completed By: KIERA NGUYEN 2/5/2024 3:22 PM    PTA Med List   Medication Sig Note Last Dose    acetaminophen (TYLENOL) 325 MG tablet Take 1-2 tablets (325-650 mg) by mouth every 6 hours as needed for mild pain 2/5/2024: Tabs  2/5/2024 at 0400    aspirin 81 MG EC tablet Take 81 mg by mouth daily  2/5/2024 at am    cetirizine (ZYRTEC) 10 MG tablet Take 1 tablet (10 mg) by mouth daily (Patient taking differently: Take 10 mg by mouth daily as needed for rhinitis)  Past Week at am    enoxaparin ANTICOAGULANT (LOVENOX) 80 MG/0.8ML syringe Inject 0.7 mLs (70 mg) Subcutaneous every 12 hours for 7 days (Patient taking differently: Inject 0.75 mg/kg Subcutaneous every 12 hours 6 AM and 6 PM)  2/5/2024 at 0600    famotidine (PEPCID) 20 MG tablet Take 20 mg by mouth 2 times daily as needed (heartburn)  2/4/2024 at am    hydrOXYzine HCl (ATARAX) 25 MG tablet Take 1-2 tablets (25-50 mg) by mouth 3 times daily as needed for itching 2/5/2024: Took 2 tablets  2/5/2024 at am    levothyroxine (SYNTHROID/LEVOTHROID) 88 MCG tablet Take 1 tablet (88 mcg) by mouth daily  2/5/2024 at am    melatonin 5 MG  tablet Take 5 mg by mouth nightly as needed for sleep  Past Week at hs    metoprolol succinate ER (TOPROL XL) 50 MG 24 hr tablet Take 0.5 tablets (25 mg) by mouth daily  2/5/2024 at am    oxyCODONE (ROXICODONE) 5 MG tablet TAKE ONE TO TWO TABLETS BY MOUTH EVERY 6 HOURS AS NEEDED FOR SEVERE PAIN - MAX OF 6 TABLETS PER DAY (Patient taking differently: Take 5-10 mg by mouth every 6 hours as needed for severe pain MAX OF 6 TABLETS PER DAY) 2/5/2024: 2 tablets  2/5/2024 at 0400    predniSONE (DELTASONE) 10 MG tablet Take 2 tablets (20 mg) by mouth daily for 3 days, THEN 1 tablet (10 mg) daily for 3 days, THEN 0.5 tablets (5 mg) daily for 3 days. 2/5/2024: 2/5: took 10 mg, 10 mg due 2/6 and 5 mg 2/7-2/9 2/5/2024 at am    pregabalin (LYRICA) 150 MG capsule TAKE 1 CAPSULE (150 MG) BY MOUTH 2 TIMES DAILY FOR 30 DAYS  2/5/2024 at am    rosuvastatin (CRESTOR) 40 MG tablet Take 1 tablet (40 mg) by mouth daily  2/5/2024 at am    tamsulosin (FLOMAX) 0.4 MG capsule Take 1 capsule (0.4 mg) by mouth daily  2/5/2024 at am    vancomycin (VANCOCIN) 1250 mg/250 mL IVPB Inject 1,250 mg into the vein every 24 hours for 21 days (Patient taking differently: Inject 1,250 mg into the vein every 24 hours 4 PM) 2/5/2024: Last infusion date: 2/21 at 4 pm  2/4/2024 at 1600    zolpidem (AMBIEN) 10 MG tablet TAKE ONE TABLET BY MOUTH EVERY NIGHT AT BEDTIME AS NEEDED FOR SLEEP  2/4/2024 at hs

## 2024-02-06 PROBLEM — Z90.49 S/P CHOLECYSTECTOMY: Status: ACTIVE | Noted: 2023-01-01

## 2024-02-06 PROBLEM — D62 ANEMIA DUE TO BLOOD LOSS, ACUTE: Status: RESOLVED | Noted: 2024-01-01 | Resolved: 2024-01-01

## 2024-02-06 PROBLEM — C78.7 METASTATIC CANCER TO LIVER (H): Status: ACTIVE | Noted: 2023-01-01

## 2024-02-06 PROBLEM — C09.9 SQUAMOUS CELL CARCINOMA OF LEFT TONSIL (H): Status: ACTIVE | Noted: 2021-04-13

## 2024-02-06 PROBLEM — E03.4 HYPOTHYROIDISM DUE TO ACQUIRED ATROPHY OF THYROID: Status: ACTIVE | Noted: 2023-03-09

## 2024-02-06 PROBLEM — Z87.898 HISTORY OF BACTEREMIA: Status: ACTIVE | Noted: 2023-01-01

## 2024-02-06 PROBLEM — D62 ANEMIA DUE TO BLOOD LOSS, ACUTE: Status: ACTIVE | Noted: 2024-01-01

## 2024-02-06 PROBLEM — N18.30 CHRONIC KIDNEY DISEASE, STAGE 3 (H): Status: ACTIVE | Noted: 2021-06-15

## 2024-02-06 PROBLEM — R10.9 ACUTE ABDOMINAL PAIN: Status: ACTIVE | Noted: 2024-01-01

## 2024-02-06 NOTE — PROGRESS NOTES
Patient has swelling to BLEs. Wife expressed concern about possible DVTs. Provider updated. US ordered to BLEs. Ok to be completed in the morning. Pt updated on plan and in agreement.

## 2024-02-06 NOTE — ED PROVIDER NOTES
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  He presented today with primary complaint of abdominal pain.  Patient was recently hospitalized 1/26/2024 through 1/31/24 secondary to septic shock due to a liver abscess due to Enterobacter hirae.  He developed severe allergic drug rash reaction to levofloxacin and currently has a PICC line in place on vancomycin.  He was started on the Lovenox just prior to discharge.  Patient notes he has had gradually increasing left sided abdominal pain with a \"lump\" noted.  Pain increases with any movement.  He has been trying to manage with oxycodone but has not been able to sleep due to the pain.  He has not had any fever since discharge.  Normal bowel movements with no blackness or blood.  Normal urination.  No nausea or vomiting.  He has not had any abdominal surgeries but had a right-sided inguinal hernia repair.  His left upper extremity swelling waxes and wanes, yesterday it was more swollen.  His skin is sloughing and dryness continues but is improving.  He just ran out of his oxycodone.  His wife has been giving him his Lovenox shots.  He is on a prednisone taper.  He has had lower extremity swelling.  His wife states that he was given Lasix for 1 dose but not continued on it.  He denies significant shortness of breath or cough.    Patient's wife has a very thorough record of his health care documented.  His weight on December 6 was 182 pounds.    Allergies:  Allergies "   Allergen Reactions    Levofloxacin Rash     Head to toe    Animal Dander     Augmentin [Amoxicillin-Pot Clavulanate] GI Disturbance    Azithromycin Nausea and Vomiting    Dust Mites     Pollen Extract     Smoke.        Problem List:    Patient Active Problem List    Diagnosis Date Noted    Anemia due to blood loss, acute 02/05/2024     Priority: Medium    Rectus sheath hematoma, initial encounter 02/05/2024     Priority: Medium    Acute deep vein thrombosis (DVT) of axillary vein of left upper extremity (H) 02/05/2024     Priority: Medium    Liver abscess 01/26/2024     Priority: Medium    Nausea with vomiting 11/03/2023     Priority: Medium    Metabolic acidosis, normal anion gap (NAG) 10/31/2023     Priority: Medium    Oropharyngeal dysphagia 10/31/2023     Priority: Medium    Elevated alkaline phosphatase level 10/31/2023     Priority: Medium    Bile salt-induced diarrhea 10/31/2023     Priority: Medium    Hypophosphatemia 10/30/2023     Priority: Medium    Hypoalbuminemia 10/30/2023     Priority: Medium    Anemia, unspecified type 10/30/2023     Priority: Medium    Elevated lactic acid level 10/30/2023     Priority: Medium    Abnormal chest CT-RLL opacity 10/30/2023     Priority: Medium    Abnormal abdominal CT scan-new extrahepatic 2 cm low attenuation area with fat stranding 10/30/2023     Priority: Medium    Inadequate oral nutritional intake 10/29/2023     Priority: Medium    Metastatic cancer to liver (H) 10/29/2023     Priority: Medium    History of bacteremia-Enterococcus Sept 2023 10/29/2023     Priority: Medium    S/P ERCP, sphincterotomy with stent placement, lap cholecystectomy 10/16-10/18/23 10/29/2023     Priority: Medium    Hypokalemia 10/28/2023     Priority: Medium    Gallstone pancreatitis 10/15/2023     Priority: Medium    Acute cholecystitis 10/07/2023     Priority: Medium    Hypothyroidism due to acquired atrophy of thyroid 03/09/2023     Priority: Medium    Malignant neoplasm metastatic to  bone (H) 01/17/2023     Priority: Medium    Pancytopenia (H) 08/16/2022     Priority: Medium    Uncomplicated opioid dependence (H)      Priority: Medium    Chronic kidney disease, stage 3 (H) 06/15/2021     Priority: Medium    Failure to thrive (0-17) 06/02/2021     Priority: Medium     Replacing diagnoses that were inactivated after the 10/1/2021 regulatory import.      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     Priority: Medium    Hypomagnesemia 04/13/2021     Priority: Medium    Hiatal hernia 02/17/2020     Priority: Medium    Renal hematoma 10/28/2017     Priority: Medium    Retroperitoneal hematoma 10/28/2017     Priority: Medium    Syncope 10/18/2017     Priority: Medium    S/P ORIF (open reduction internal fixation) fracture 08/03/2017     Priority: Medium    Closed Colles' fracture of right radius with routine healing, subsequent encounter 08/03/2017     Priority: Medium    Status post insertion of drug-eluting stent into left anterior descending artery for coronary artery disease 01/05/2017     Priority: Medium    Chest pain 12/24/2016     Priority: Medium    Arthropathy 02/04/2014     Priority: Medium     Problem list name updated by automated process. Provider to review      Coronary atherosclerosis      Priority: Medium     Coronary artery disease  Problem list name updated by automated process. Provider to review      Hallux rigidus 07/16/2013     Priority: Medium    Inguinal hernia 06/28/2013     Priority: Medium    Degenerative arthritis of foot 06/28/2013     Priority: Medium    Hypertension goal BP (blood pressure) < 140/90 06/12/2012     Priority: Medium    Hyperlipidemia LDL goal <130 12/22/2011     Priority: Medium    Anxiety 11/02/2011     Priority: Medium    Allergic conjunctivitis 07/08/2008     Priority: Medium    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, insomnia type 11/17/2006     Priority: Medium     Problem list name updated by automated process. Provider to  review      Acute reaction to stress 01/12/2005     Priority: Medium     Problem list name updated by automated process. Provider to review      Chronic maxillary sinusitis 01/12/2005     Priority: Medium    Contracture of palmar fascia 01/12/2005     Priority: Medium    Benign neoplasm of skin of other and unspecified parts of face 01/12/2005     Priority: Medium    Malignant neoplasm of prostate (H) 11/26/2004     Priority: Medium        Past Medical History:    Past Medical History:   Diagnosis Date    Allergic rhinitis     Allergy, unspecified not elsewhere classified     Antiplatelet or antithrombotic long-term use     Anxiety     Arthritis     Chest pain     Chronic sinusitis     Coronary atherosclerosis of unspecified type of vessel, native or graft     Depressive disorder 1995    Gastroesophageal reflux disease 2020    Head injury 1954    Hiatal hernia 2015    History of blood transfusion 12/15/2004    Hyperlipidemia     Hypertension     Inguinal hernia     Kidney problem 10/08/2017    Kidney stones     Malignant neoplasm of prostate (H)     Nausea with vomiting 11/03/2023    Prostate cancer (H)     Syncope     Thyroid disease     Tonsil cancer (H)        Past Surgical History:    Past Surgical History:   Procedure Laterality Date    ABDOMEN SURGERY      ARTHRODESIS FOOT  07/23/2013    Procedure: ARTHRODESIS FOOT;  Great Toe Arthrodesis Left Foot;  Surgeon: Ash Gonzalez DPM;  Location: [facility]    ARTHRODESIS FOOT  06/10/2014    Procedure: ARTHRODESIS FOOT;  Surgeon: Ash Gonzalez DPM;  Location: [facility]    BIOPSY  10/01/2004    dermatologist biopsies    COLONOSCOPY  10/07/2013    Procedure: COLONOSCOPY;  Colonoscopy;  Surgeon: Mike Fallon MD;  Location:  GI    CYSTOSCOPY N/A 02/16/2022    Procedure: CYSTOSCOPY and bladder stone removal;  Surgeon: David Rogers MD;  Location: [facility]    ENDOSCOPIC RETROGRADE CHOLANGIOPANCREATOGRAM N/A 1/15/2024    Procedure: ENDOSCOPIC RETROGRADE  CHOLANGIOPANCREATOGRAPHY, WITH stent removal;  Surgeon: Trent Blood MD;  Location:  OR    ENDOSCOPIC RETROGRADE CHOLANGIOPANCREATOGRAM COMPLEX N/A 10/16/2023    Procedure: ENDOSCOPIC RETROGRADE CHOLANGIOPANCREATOGRAPHY, COMPLEX;  Surgeon: Trent Blood MD;  Location:  OR    ENDOSCOPIC ULTRASOUND UPPER GASTROINTESTINAL TRACT (GI) N/A 10/16/2023    Procedure: Endoscopic ultrasound upper gastrointestinal tract (GI);  Surgeon: Trent Blood MD;  Location:  OR    ENT SURGERY      ESOPHAGOSCOPY, GASTROSCOPY, DUODENOSCOPY (EGD), COMBINED N/A 02/12/2020    Procedure: ESOPHAGOGASTRODUODENOSCOPY (EGD);  Surgeon: Sam Escobar MD;  Location:  GI    EXTRACORPOREAL SHOCK WAVE LITHOTRIPSY (ESWL) Bilateral 10/18/2017    Procedure: EXTRACORPOREAL SHOCK WAVE LITHOTRIPSY (ESWL);  BILATERAL EXTRACORPOREAL SHOCKWAVE LITHOTRIPSY ;  Surgeon: Meir Torres MD;  Location:  OR    HC CORRECT BUNION,SIMPLE  08/11/2005    x3    HC REMV TOE BENIGN BONE LESN  08/11/2005    HEAD & NECK SURGERY  1954    From a fall    HERNIORRHAPHY INGUINAL  07/03/2013    Procedure: HERNIORRHAPHY INGUINAL;  Open Repair Inguinal hernia Right with mesh ;  Surgeon: Sam Escobar MD;  Location:  OR    IR GASTROSTOMY TUBE PERCUTANEOUS PLCMNT  06/07/2021    LAPAROSCOPIC CHOLECYSTECTOMY N/A 10/18/2023    Procedure: CHOLECYSTECTOMY, LAPAROSCOPIC;  Surgeon: Dannie Warner MD;  Location:  OR    MOHS MICROGRAPHIC PROCEDURE  08/23/2011    [facility]    OPEN REDUCTION INTERNAL FIXATION WRIST Right 07/18/2017    Procedure: OPEN REDUCTION INTERNAL FIXATION WRIST;  Right distal radius open reduction and internal fixation;  Surgeon: Pedro Blanca DO;  Location: [facility]    RECONSTRUCT FOREFOOT WITH METATARSOPHALANGEAL (MTP) FUSION  06/10/2014    Procedure: RECONSTRUCT FOREFOOT WITH METATARSOPHALANGEAL (MTP) FUSION;  Surgeon: Ash Gonzalez DPM;  Location: [facility]    STENT,  CORONARY, DEMI      SURGICAL HISTORY OF -   1999/1974    lt knee    SURGICAL HISTORY OF -   10/2004    lithotripsy    SURGICAL HISTORY OF -   11/2005    angiogram with stent    VASCULAR SURGERY  11/17/2005    Puncture of iliac artery during and angiogram    [facility] LAPAROSCOPY, SURGICAL PROSTATECTOMY, RETROPUBIC RADICAL, W/NERVE SPARING  11/30/2004    With full bilateral pelvic lymphadenectomy.  -[facility].    [facility] TOTAL KNEE ARTHROPLASTY  05/01/2008    Left knee       Family History:    Family History   Problem Relation Age of Onset    Hypertension Father         Lived to age 87    Connective Tissue Disorder Mother         LUPUS    Heart Disease Mother         Valve replacement    Anxiety Disorder Mother         Lived to age 84    Dementia Mother         Nursing Home (lived to age 86)       Social History:  Marital Status:   [2]  Social History     Tobacco Use    Smoking status: Never    Smokeless tobacco: Never   Vaping Use    Vaping Use: Never used   Substance Use Topics    Alcohol use: Yes     Alcohol/week: 10.0 standard drinks of alcohol     Types: 10 Cans of beer per week     Comment: Moderate - 1 to two beers per day. ( None lately).    Drug use: No        Medications:    No current facility-administered medications on file prior to encounter.  acetaminophen (TYLENOL) 325 MG tablet, Take 1-2 tablets (325-650 mg) by mouth every 6 hours as needed for mild pain  aspirin 81 MG EC tablet, Take 81 mg by mouth daily  cetirizine (ZYRTEC) 10 MG tablet, Take 1 tablet (10 mg) by mouth daily (Patient taking differently: Take 10 mg by mouth daily as needed for rhinitis)  enoxaparin ANTICOAGULANT (LOVENOX) 80 MG/0.8ML syringe, Inject 0.7 mLs (70 mg) Subcutaneous every 12 hours for 7 days (Patient taking differently: Inject 0.75 mg/kg Subcutaneous every 12 hours 6 AM and 6 PM)  famotidine (PEPCID) 20 MG tablet, Take 20 mg by mouth 2 times daily as needed (heartburn)  hydrOXYzine HCl (ATARAX) 25 MG tablet, Take 1-2 tablets  (25-50 mg) by mouth 3 times daily as needed for itching  levothyroxine (SYNTHROID/LEVOTHROID) 88 MCG tablet, Take 1 tablet (88 mcg) by mouth daily  melatonin 5 MG tablet, Take 5 mg by mouth nightly as needed for sleep  metoprolol succinate ER (TOPROL XL) 50 MG 24 hr tablet, Take 0.5 tablets (25 mg) by mouth daily  oxyCODONE (ROXICODONE) 5 MG tablet, TAKE ONE TO TWO TABLETS BY MOUTH EVERY 6 HOURS AS NEEDED FOR SEVERE PAIN - MAX OF 6 TABLETS PER DAY (Patient taking differently: Take 5-10 mg by mouth every 6 hours as needed for severe pain MAX OF 6 TABLETS PER DAY)  predniSONE (DELTASONE) 10 MG tablet, Take 2 tablets (20 mg) by mouth daily for 3 days, THEN 1 tablet (10 mg) daily for 3 days, THEN 0.5 tablets (5 mg) daily for 3 days.  pregabalin (LYRICA) 150 MG capsule, TAKE 1 CAPSULE (150 MG) BY MOUTH 2 TIMES DAILY FOR 30 DAYS  rosuvastatin (CRESTOR) 40 MG tablet, Take 1 tablet (40 mg) by mouth daily  tamsulosin (FLOMAX) 0.4 MG capsule, Take 1 capsule (0.4 mg) by mouth daily  vancomycin (VANCOCIN) 1250 mg/250 mL IVPB, Inject 1,250 mg into the vein every 24 hours for 21 days (Patient taking differently: Inject 1,250 mg into the vein every 24 hours 4 PM)  zolpidem (AMBIEN) 10 MG tablet, TAKE ONE TABLET BY MOUTH EVERY NIGHT AT BEDTIME AS NEEDED FOR SLEEP  naloxone (NARCAN) 4 MG/0.1ML nasal spray, Spray 1 spray (4 mg) into one nostril alternating nostrils as needed for opioid reversal every 2-3 minutes until assistance arrives  nitroGLYcerin (NITROSTAT) 0.4 MG sublingual tablet, For chest pain place 1 tablet under the tongue every 5 minutes for 3 doses. If symptoms persist 5 minutes after 1st dose call 911.  ondansetron (ZOFRAN ODT) 4 MG ODT tab, DISSOLVE ONE TABLET BY MOUTH EVERY 8 HOURS AS NEEDED FOR NAUSEA (Patient not taking: Reported on 2/5/2024)  ondansetron (ZOFRAN) 4 MG tablet, Take 1 tablet (4 mg) by mouth every 8 hours as needed for nausea (Patient not taking: Reported on 2/5/2024)           Review of Systems  All  "other ROS reviewed and are negative or non-contributory except as stated in HPI.     Physical Exam   BP: 120/75  Pulse: 79  Temp: 98.9  F (37.2  C)  Resp: 18  Height: 172.7 cm (5' 8\")  Weight: 93.1 kg (205 lb 4.8 oz)  SpO2: 100 %      Physical Exam  Vitals and nursing note reviewed.   Constitutional:       Comments: Very pleasant older man sitting in the bed   HENT:      Head: Normocephalic.      Nose: Nose normal.      Mouth/Throat:      Pharynx: Oropharynx is clear.      Comments: Slightly tacky mucous membranes  Eyes:      General: No scleral icterus.     Extraocular Movements: Extraocular movements intact.      Conjunctiva/sclera: Conjunctivae normal.   Cardiovascular:      Rate and Rhythm: Normal rate and regular rhythm.      Pulses: Normal pulses.      Heart sounds: Normal heart sounds.      Comments: PICC line in right upper arm.  Pulmonary:      Effort: Pulmonary effort is normal.      Breath sounds: Normal breath sounds.   Abdominal:       Musculoskeletal:      Cervical back: Normal range of motion and neck supple.      Right lower leg: Edema present.      Left lower leg: Edema present.      Comments: Edema of the left upper extremity and mild edema bilateral lower extremities.  Mild erythema and warmth of the left upper extremity.   Skin:     Comments: Diffuse dryness with some sloughing and peeling and healing scabs of the skin over the entire body.  Left arm is swollen and mildly erythematous   Neurological:      General: No focal deficit present.      Mental Status: He is alert.   Psychiatric:         Mood and Affect: Mood normal.         Behavior: Behavior normal.         ED Course (with Medical Decision Making)    Pt seen and examined by me.  RN and EPIC notes reviewed.       Patient with very complex recent medical history.  He has metastatic cancer, liver abscess with IV vancomycin, left upper extremity DVT on Lovenox.  His primary complaint is significant abdominal pain mostly centered around a " palpable mass in the left lower quadrant of the abdomen.  He has not had surgeries.  This could be a hernia but I am more inclined to think this is a hematoma, possibly related to his Lovenox injections versus other cause.  No trauma.  He has had no other areas of significant bleeding with normal urination and bowel movements.    I am going to try to get his pain under better control.  I am also going to CT his chest/abdomen/pelvis to see the extent of the clot along with checking the lump in the abdomen.  If he has to go off of blood thinners because of a possible hematoma I would like to know the clot status.    Patient had improvement of his pain with IV pain medications.  Labs show elevated LFTs as noted below but they are near previous numbers.  Hemoglobin is 8.5 which is equal to hemoglobin 5 days ago.  Normal white count.  Platelets 147.    CT results called to me by radiology.  He has moderate size left rectus sheath hematoma with concern for possible active extravasation into the hematoma.  In addition, patient has left subclavian and axillary vein thrombosis.  Trace right and small left pleural effusions with bibasilar atelectasis.  Progression of hepatic metastases.  Seventh rib and T4 osseous metastases.  Pancreatic neck heterogeneous finding, stable.    I spoke with radiology about possible IR intervention of rectus sheath hematomas.  General radiology thought this would be a possibility.  I did contact interventional radiology at [facility].  He recommended holding patient's Lovenox for 48 hours and suspected that this would help with any active bleeding.  He did not recommend any intervention.    Patient was supposed to be at [facility] earlier today.  For this reason, I contacted [facility] as I feel patient needs either intervention or further evaluation and management both of his pain and recommendations for both the clot and the bleeding.  They put me in touch with interventional  radiologist at [facility] and after reviewing all of the studies/scans his recommendation was to change patient to heparin which could be easily turned off if needed.  Place an abdominal binder.  He did not feel IR intervention needed at this point as patient clinically stable with no change in hemoglobin.  In addition, he felt that the risk would be too high to try to intervene on the subclavian/axillary clot.  He did not feel patient needed to be transferred to [facility].    I spoke with the hospitalist in this facility.  Current plan is to start heparin, continue pain medications, place abdominal binder.  Closely monitor hemoglobin and vitals.  Patient accepted to the hospitalist service.  All the results and plans discussed with him.  He is stable.         Procedures  Results for orders placed or performed during the hospital encounter of 02/05/24 (from the past 24 hour(s))   CBC with platelets differential    Narrative    The following orders were created for panel order CBC with platelets differential.  Procedure                               Abnormality         Status                     ---------                               -----------         ------                     CBC with platelets and d...[[id number]]  Abnormal            Final result                 Please view results for these tests on the individual orders.   Lactic acid whole blood w/ reflex x1 in 3 Hr when >2   Result Value Ref Range    Lactic Acid, Initial 0.9 0.7 - 2.0 mmol/L   Comprehensive metabolic panel   Result Value Ref Range    Sodium 142 135 - 145 mmol/L    Potassium 3.4 3.4 - 5.3 mmol/L    Carbon Dioxide (CO2) 19 (L) 22 - 29 mmol/L    Anion Gap 9 7 - 15 mmol/L    Urea Nitrogen 12.7 8.0 - 23.0 mg/dL    Creatinine 0.97 0.67 - 1.17 mg/dL    GFR Estimate 83 >60 mL/min/1.73m2    Calcium 8.1 (L) 8.8 - 10.2 mg/dL    Chloride 114 (H) 98 - 107 mmol/L    Glucose 91 70 - 99 mg/dL    Alkaline Phosphatase 244 (H) 40 - 150 U/L     (H) 0  - 45 U/L     (H) 0 - 70 U/L    Protein Total 5.4 (L) 6.4 - 8.3 g/dL    Albumin 2.8 (L) 3.5 - 5.2 g/dL    Bilirubin Total 0.3 <=1.2 mg/dL   CBC with platelets and differential   Result Value Ref Range    WBC Count 4.1 4.0 - 11.0 10e3/uL    RBC Count 3.20 (L) 4.40 - 5.90 10e6/uL    Hemoglobin 8.5 (L) 13.3 - 17.7 g/dL    Hematocrit 27.3 (L) 40.0 - 53.0 %    MCV 85 78 - 100 fL    MCH 26.6 26.5 - 33.0 pg    MCHC 31.1 (L) 31.5 - 36.5 g/dL    RDW 19.9 (H) 10.0 - 15.0 %    Platelet Count 147 (L) 150 - 450 10e3/uL    % Neutrophils 65 %    % Lymphocytes 21 %    % Monocytes 14 %    % Eosinophils 0 %    % Basophils 0 %    % Immature Granulocytes 0 %    NRBCs per 100 WBC 0 <1 /100    Absolute Neutrophils 2.7 1.6 - 8.3 10e3/uL    Absolute Lymphocytes 0.9 0.8 - 5.3 10e3/uL    Absolute Monocytes 0.6 0.0 - 1.3 10e3/uL    Absolute Eosinophils 0.0 0.0 - 0.7 10e3/uL    Absolute Basophils 0.0 0.0 - 0.2 10e3/uL    Absolute Immature Granulocytes 0.0 <=0.4 10e3/uL    Absolute NRBCs 0.0 10e3/uL   CT Chest/Abdomen/Pelvis w Contrast    Narrative    CT CHEST/ABDOMEN/PELVIS W CONTRAST 2/5/2024 9:16 AM    CLINICAL HISTORY: Anasarca, left lower quadrant pain with mass,  potentially hematoma versus hernia, left arm swelling increasing with  known DVT    TECHNIQUE: CT scan of the chest, abdomen, and pelvis was performed  following injection of IV contrast. Multiplanar reformats were  obtained. Dose reduction techniques were used.   CONTRAST: [id number], 100mL    COMPARISON: 1/25/2024, 1/19/2024, 12/26/2023 and 10/29/2023    FINDINGS:   LUNGS AND PLEURA: New trace right and small left pleural effusion and  bibasilar atelectasis. No new or enlarging pulmonary masses.    MEDIASTINUM/AXILLAE: Filling defect in the left subclavian and  axillary veins, but assessed on the concomitant venous ultrasound of  the left upper extremity. The right arm PICC tip at the SVC/right  atrial junction. No filling defects in the central pulmonary arteries.  No  lymphadenopathy in the mediastinum, hilar regions or axillary  regions. Coronary stents.    HEPATOBILIARY: Decreased size of the ablation zone and segment 8 and  decreased size of adjacent soft tissue extending outside the liver.  The ablation cavity measures 2.3 x 1.6 cm (3/143) previously measuring  3.1 x 2.3 cm and the soft tissue abutting the liver capsule measures  3.7 x 2.2 cm (3/136), previously 3.9 x 2.0 cm.     Slight gradual increase of number and size of hepatic metastases. For  example, a 2.4 cm lesion in segment 8 previously measured 1.7 cm  (3/132), a 1.9 cm lesion in segment 6/7 (3/146) previously measured  1.3 cm, a 1.0 cm lesion in segment 2 is new (3/145) and a 1.0 cm  lesion in the medial segment 6 is new (3/169).    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 Patent main portal vein.    Cholecystectomy. No biliary ductal dilatation.    PANCREAS: Stable heterogeneous hypodense lesion in the pancreatic neck  measuring 1.5 cm (3/173). No pancreatic ductal dilatation,  peripancreatic inflammatory changes or fluid collections.    SPLEEN: Normal.    ADRENAL GLANDS: Normal.    KIDNEYS/BLADDER: Few small nonobstructing bilateral renal calculi.  Subcentimeter left renal cyst requiring no specific follow-up. Stone  catheter decompresses the urinary bladder.    BOWEL: Diverticulosis in the colon. No acute inflammatory change. No  obstruction.     PELVIC ORGANS: Prostatectomy. No pelvic masses.    ADDITIONAL FINDINGS: Moderate sized left rectus sheath hematoma  measuring 7.1 x 5.5 x 11.9 cm. A small hyperdense linear foci within  the hematoma concerning for active extravasation (series 3, image  [id number]).    No lymphadenopathy in the abdomen and pelvis. Right external iliac  artery stent. No abdominal aortic aneurysm. Small amount of free fluid  in the pelvis. No fluid collections or free air.    MUSCULOSKELETAL: Since the chest CT on 10/29/2023, new  sclerotic  metastasis in the posterior right seventh rib. Increased size of  sclerotic metastasis in T4. Degenerative changes of the spine. Few  healing left-sided rib fractures.      Impression    IMPRESSION:  1.  Moderate size left rectus sheath hematoma with concern for active  extravasation into the hematoma.  2.  Left subclavian and axillary vein thrombosis, better assessed by  the concomitant venous ultrasound of the left upper extremity.  3.  Trace right and small left pleural effusion and bibasilar  atelectasis are new.  4.  Interval progression of hepatic metastases with increased size and  number of hepatic metastases. Decreased size of the ablation zone in  the right lobe of the dome.  5.  Slight worsening of right seventh rib and T4 osseous metastases.  6.  Stable 1.5 cm heterogeneous area in the pancreatic neck. Attention  on follow-up.    ANDREY CAMACHO MD         SYSTEM ID:  [id number]   CBC with platelets   Result Value Ref Range    WBC Count 5.3 4.0 - 11.0 10e3/uL    RBC Count 3.19 (L) 4.40 - 5.90 10e6/uL    Hemoglobin 8.5 (L) 13.3 - 17.7 g/dL    Hematocrit 27.1 (L) 40.0 - 53.0 %    MCV 85 78 - 100 fL    MCH 26.6 26.5 - 33.0 pg    MCHC 31.4 (L) 31.5 - 36.5 g/dL    RDW 20.1 (H) 10.0 - 15.0 %    Platelet Count 157 150 - 450 10e3/uL   Hemoglobin   Result Value Ref Range    Hemoglobin 8.6 (L) 13.3 - 17.7 g/dL   Potassium   Result Value Ref Range    Potassium 3.4 3.4 - 5.3 mmol/L   Partial thromboplastin time   Result Value Ref Range    aPTT 113 (H) 22 - 38 Seconds   Potassium   Result Value Ref Range    Potassium 4.1 3.4 - 5.3 mmol/L   Hemoglobin   Result Value Ref Range    Hemoglobin 8.5 (L) 13.3 - 17.7 g/dL     *Note: Due to a large number of results and/or encounters for the requested time period, some results have not been displayed. A complete set of results can be found in Results Review.       Medications   sodium chloride 0.9 % infusion ( Intravenous $New Bag 2/5/24 9893)   heparin 25,000 units in  0.45% NaCl 250 mL ANTICOAGULANT infusion (0 Units/hr Intravenous Stopped 2/6/24 0055)   calcium carbonate (TUMS) chewable tablet 1,000 mg (has no administration in time range)   sodium chloride 0.9 % infusion ( Intravenous $New Bag 2/5/24 1707)   naloxone (NARCAN) injection 0.2 mg (has no administration in time range)     Or   naloxone (NARCAN) injection 0.4 mg (has no administration in time range)     Or   naloxone (NARCAN) injection 0.2 mg (has no administration in time range)     Or   naloxone (NARCAN) injection 0.4 mg (has no administration in time range)   HYDROmorphone (PF) (DILAUDID) injection 0.3 mg (0.3 mg Intravenous $Given 2/5/24 2102)   hydrALAZINE (APRESOLINE) injection 10 mg (has no administration in time range)   famotidine (PEPCID) tablet 20 mg (has no administration in time range)   hydrOXYzine HCl (ATARAX) tablet 25-50 mg (50 mg Oral $Given 2/5/24 2000)   levothyroxine (SYNTHROID/LEVOTHROID) tablet 88 mcg (has no administration in time range)   metoprolol succinate ER (TOPROL XL) 24 hr tablet 25 mg (has no administration in time range)   nitroGLYcerin (NITROSTAT) sublingual tablet 0.4 mg (has no administration in time range)   oxyCODONE (ROXICODONE) tablet 5-10 mg (10 mg Oral $Given 2/5/24 2000)   predniSONE (DELTASONE) tablet 10 mg (has no administration in time range)     Followed by   predniSONE (DELTASONE) tablet 5 mg (has no administration in time range)   pregabalin (LYRICA) capsule 150 mg (150 mg Oral $Given 2/5/24 2001)   rosuvastatin (CRESTOR) tablet 40 mg (has no administration in time range)   tamsulosin (FLOMAX) capsule 0.4 mg (has no administration in time range)   zolpidem (AMBIEN) tablet 10 mg (10 mg Oral $Given 2/5/24 2102)   senna-docusate (SENOKOT-S/PERICOLACE) 8.6-50 MG per tablet 1 tablet (has no administration in time range)     Or   senna-docusate (SENOKOT-S/PERICOLACE) 8.6-50 MG per tablet 2 tablet (has no administration in time range)   ondansetron (ZOFRAN ODT) ODT tab 4 mg  (4 mg Oral $Given 2/5/24 2102)     Or   ondansetron (ZOFRAN) injection 4 mg ( Intravenous See Alternative 2/5/24 2102)   acetaminophen (TYLENOL) tablet 650 mg (has no administration in time range)     Or   acetaminophen (TYLENOL) Suppository 650 mg (has no administration in time range)   sodium chloride 0.9 % infusion ( Intravenous Canceled Entry 2/5/24 1819)   vancomycin (VANCOCIN) 1,250 mg in sodium chloride 0.9 % 250 mL intermittent infusion (has no administration in time range)   sodium chloride (PF) 0.9% PF flush 10-20 mL (has no administration in time range)   sodium chloride (PF) 0.9% PF flush 10-40 mL ( Intracatheter Not Given 2/5/24 1856)   sodium chloride (PF) 0.9% PF flush 10-40 mL (has no administration in time range)   HYDROmorphone (PF) (DILAUDID) injection 0.5 mg (0.5 mg Intravenous $Given 2/5/24 1032)   iopamidol (ISOVUE-370) solution 500 mL (100 mLs Intravenous $Given 2/5/24 0906)   sodium chloride 0.9 % bag 100mL for CT scan flush use (60 mLs Intravenous $Given 2/5/24 0905)   LORazepam (ATIVAN) injection 1 mg (1 mg Intravenous $Given 2/5/24 1035)   potassium chloride ER (KLOR-CON M) CR tablet 40 mEq (40 mEq Oral $Given 2/5/24 1959)       Assessments & Plan     I have reviewed the findings, diagnosis, plan with the patient.  Current Discharge Medication List          Final diagnoses:   Rectus sheath hematoma, initial encounter   Anemia due to blood loss, acute   Acute deep vein thrombosis (DVT) of axillary vein of left upper extremity (H)     Disposition: Patient admitted to the hospitalist service.    Note: Chart documentation done in part with Dragon Voice Recognition software. Although reviewed after completion, some word and grammatical errors may remain.   2/5/2024   [address] MEDICAL SURGICAL       Janet Colon MD  02/06/24 0134

## 2024-02-06 NOTE — PROGRESS NOTES
Pt having increased pain, rating it 8/10 to LLQ and wrapping around to L side of back. Stated it was the worst it's been. Pt restless.  Pt not due for his oxycodone yet nor his PRN dilaudid. Messaged night provider. Night provider adjusted PRN dilaudid and oxycodone orders.

## 2024-02-06 NOTE — SIGNIFICANT EVENT
Significant Event Note    Time of event: 4:34 PM February 6, 2024    Description of event:  Patient's hemoglobin has dropped from 7.9 at 0600 this morning down to 6.9 this afternoon with patient have stable vitals but ongoing abdominal pain requiring increased frequency of narcotics and some developing dizziness upon standing and walking this afternoon.  Concern is for recurrent/ongoing bleeding of the rectal sheath hematoma     Ultrasound shows ongoing left upper extremity DVT stable from previous.      Plan:  -Discontinue heparin drip now and complete Vanco administration currently running.  -Type and cross 1 unit PRBC now with plans to transfuse after Vanco infusion is complete - obtain another hemoglobin just prior to transfusion to evaluate for hemoglobin stability vs ongoing worsening following heparin discontinuation  -check hemoglobin every 4 hours after transfusion with vitals signs every 2 hours with increased monitoring if there is any signs of hemodynamic instability   -if there is concern for ongoing bleeding even after heparin drip has been discontinued will need to transfer to higher level of care urgently for IR intervention  -If hemoglobin stablizes off heparin drip, will need to discuss next steps with patient's [facility] oncology team - Dr. Waldo Molina, office number [phone]      Discussed with: Patient, wife, bedside nurse, charge nurse, Dr. Rich who is taking over patient's care this evening.     Neema Beckett MD

## 2024-02-06 NOTE — PROGRESS NOTES
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 who developed sepsis with persistent Entercoccus bacteremia in September 2023 and was found to have a liver abscess on October 2023 s/p biospy currently on IV Vancomycin via home infusion for ongoing treatment with further chemotherapy on hold, recent severe drug rash to Levaquin, recent left upper extremity DVT from PICC line during previous hospitalization on Lovenox injections, CAD s/p PCI in remote past, essential HTN, history of prostate cancer s/p prostatectomy in remission, hypothyroidism who presented with acute onset of abdominal pain and was found to have a rectal sheath hematoma with active extravagation.  IR did not recommend transfer but recommended hospitalization locally with transition off Lovenox and on to heparin drip to see if hematoma would stabilize with compression alone and allow for ongoing anticoagulation in light of active DVT and cancer diagnosis.  If hemoglobin decreased significantly, heparin drip should be discontinued and hemoglobin continued to be monitored for stability off anticoagulation.      Currently hemoglobin had decreased slightly from 8.5 to 7.9 but is now 8.2 on recent recheck and patient's pain has been well controlled with increased PO oxycodone frequency from home regimen.  Of note, patient did have one hypotensive blood pressure overnight and received a 250 ml fluid bolus which may contribute to reduction noted at 0600 this morning.  Discussed with patient's primary oncologist, Dr. Flaco Molina from [facility] and will continue heparin drip and serial hemoglobin monitoring overnight to ensure stability with plans to transition patient to Lovenox 50 mg every  12 hours (decrease of previous home dose by approximately 30%) and continue this lower dosing until outpatient follow up with oncology in 3 weeks, where further treatment plan would be readdressed.  Patient and wife in agreement     Principal Problem:    Acute abdominal pain    Rectus sheath hematoma, initial encounter    Assessment: occurring spontaneously with active extravagation on imaging however hemoglobin appears to have stabilized after slight drop this morning.     Plan:   -continue every 4 hour hemoglobin checks  -continue vitals every 4 hours to monitor for hemodynamic stability  -continue low intensity heparin drip for now with plans to transition to Lovenox 50 mg subcutaneous every 12 hours tomorrow if hemoglobin is stable  -if there is concern for further bleeding, will discontinue heparin drip and continue to monitor hemoglobin for stability off anticoagulation with further discussion with oncology of outpatient plan if that occurs. Patient's primary oncologist is Dr. Flaco Molina from [facility] - office is [phone]  -hold metoprolol for now - unfortunately morning dose has already been given and will monitor blood pressure closely for stability     Active Problems:    History of bacteremia-Enterococcus Sept 2023    Liver abscess    S/P ERCP, sphincterotomy with stent placement, lap cholecystectomy 10/16-10/18/23    Assessment: patient has been struggling with ongoing liver abscess since September, currently on IV vancomycin and being following by [facility] infectious disease provider, Dr. Mckayla Ponce.      Plan:   -continue with IV vanco during this hospitalization and at discharge with follow up appointment at the end of this month as previously arranged.       Acute deep vein thrombosis (DVT) of axillary vein of left upper extremity (H)    Assessment: occurring secondary to previous PICC line placement in combination with a fall which caused trauma/displacement in patient with active  metastatic cancer.  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  Patient is very focused on getting infection resolved so he can return to his chemo and immunotherapy treatments.  Has appointment for hopeful infusion for the end of this months after ID clearance.      Plan:   -no acute intervention  -Follow up with Dr. Yasmani Molina at the end of this month as previously planned      Hyperlipidemia LDL goal <130    Assessment: on Crestor 40 mg daily    Plan:   -continue home regimen without change       Hypertension goal BP (blood pressure) < 140/90    Assessment: patient is on metoprolol XL 25 mg daily which was given this morning and my limit tachycardia ability of the heart.  Blood pressures have been low normal     Plan:   -will hold metoprolol going forward and monitor for blood pressure stability       Status post insertion of drug-eluting stent into left anterior descending artery for coronary artery disease over 20 year ago    Assessment: on metoprolol and ASA at baseline.  ASA has been held secondary to acute bleeding    Plan:   -continue to hold ASA and will also hold metoprolol for now given low normal blood pressures       Chronic kidney disease, stage 3 (H)    Assessment: baseline creatinine 0.8-1.0.  Was 0.97 at admission and 1.1 today    Plan:   -monitor for ongoing creatinine stability       Hypothyroidism due to  "acquired atrophy of thyroid    Assessment: on levothyroxine 88 mcg daily     Plan:   -continue home regimen without change            Observation Goals: -vital signs normal or at patient baseline, hb stable, Nurse to notify provider when observation goals have been met and patient is ready for discharge.  Diet: Regular Diet Adult    DVT Prophylaxis: heparin drip, low intensity   Stone Catheter: PRESENT, indication:    Lines: PRESENT      PICC 01/30/24 Single Lumen Right Basilic-Site Assessment: WDL      Cardiac Monitoring: ACTIVE order. Indication: to monitor for bleeding  Code Status: Full Code      Clinically Significant Risk Factors Present on Admission              # Hypoalbuminemia: Lowest albumin = 2.5 g/dL at 2/6/2024  6:18 AM, will monitor as appropriate  # Drug Induced Coagulation Defect: home medication list includes an anticoagulant medication  # Drug Induced Platelet Defect: home medication list includes an antiplatelet medication   # Hypertension: Noted on problem list      # Obesity: Estimated body mass index is 30.11 kg/m  as calculated from the following:    Height as of this encounter: 1.727 m (5' 8\").    Weight as of this encounter: 89.8 kg (198 lb).       # Financial/Environmental Concerns:           Disposition Plan     Expected Discharge Date: 02/06/2024                    Neema Beckett MD  Hospitalist Service  [facility]  Securely message with App [employer] (more info)  Text page via [employer] Paging/Directory   ______________________________________________________________________    Interval History   Patient had one blood pressure of 68/46 documented - unable to see confirmation reading performed.  Patient was completely asymptomatic during this time, had recently been given IV narcotics.  Was given a 250 mL fluid bolus with next blood pressure normalizing and patient still asymptomatic.  Hemoglobin had been stable at 8.4-8.5, was down to 7.9 following fluid " bolus but recheck at 1000 is 8.2.  Patient continues to have abdominal pain but pain better controlled with increased frequency of oxycodone orally.  Patient denies any new symptoms this morning.  No new nursing concerns.     Physical Exam   Vital Signs: Temp: 97.9  F (36.6  C) Temp src: Oral BP: 108/50 Pulse: 74   Resp: 20 SpO2: 97 % O2 Device: None (Room air)    Weight: 198 lbs 0 oz    Constitutional: awake, alert, cooperative, no apparent distress, and appears stated age  Respiratory: No increased work of breathing, good air exchange, clear to auscultation bilaterally, no crackles or wheezing  Cardiovascular: RRR  GI: bowel sounds present, abdomen soft and non-distended, ongoing tenderness and mass noted on palpation of the inferior lateral umbilicus but unclear if stable in size from ED providers exam yesterday   Skin: ongoing skin irritation, cracking, dryness diffusely but worst in left upper extremity secondary to previously known drug rash and patient/wife reports this is continuing to slowly improve  Musculoskeletal: 1-2+ pitting edema in bilateral lower leg, non-pitting edema present in right upper extremity distal to DVT  Neurologic: Awake, alert, oriented to name, place and situation     Medical Decision Making     52 MINUTES SPENT BY ME on the date of service doing chart review, history, exam, documentation & further activities per the note.      Data     I have personally reviewed the following data over the past 24 hrs:    4.5  \   8.2 (L)   / 144 (L)     140; 140 114 (H); 114 (H) 13.2 /  83   4.3; 4.3 17 (L); 17 (L) 1.19 (H) \     ALT: 130 (H) AST: 118 (H) AP: 224 (H) TBILI: 0.5   ALB: 2.5 (L) TOT PROTEIN: 5.1 (L) LIPASE: N/A     INR:  N/A PTT:  83 (H)   D-dimer:  N/A Fibrinogen:  N/A

## 2024-02-06 NOTE — UTILIZATION REVIEW
"  Admission Status; Secondary Review Determination         Under the authority of the Utilization Management Committee, the utilization review process indicated a secondary review on the above patient.  The review outcome is based on review of the medical records, discussions with staff, and applying clinical experience noted on the date of the review.        (xxx)      Inpatient Status Appropriate - This patient's medical care is consistent with medical management for inpatient care and reasonable inpatient medical practice.      () Observation Status Appropriate - This patient does not meet hospital inpatient criteria and is placed in observation status. If this patient's primary payer is Medicare and was admitted as an inpatient, Condition Code 44 should be used and patient status changed to \"observation\".   () Admission Status NOT Appropriate - This patient's medical care is not consistent with medical management for Inpatient or Observation Status.          RATIONALE FOR DETERMINATION     Quan Murphy is a 74 yo male with a history of SCC of the oropharynx on chemotherapy, HTN, CAD, CKD, and hypothyroidism recently admitted due to liver abscess who was admitted on 2/5/2024 with acute abdominal pain  He was found to have a moderate size left rectus sheath hematoma and there is concern for active extravasation into the hematoma.  He is admitted for close clinical monitoring, IV heparin, pain management, and further evaluation.  I spoke with Dr. Luna and this afternoon his hemoglobin has decreased to 7.1. The plan is to recheck hemoglobin with further plan pending hemoglobin results.  He will require at least 1-2 days further hospitalization.  IP status is appropriate.    The severity of illness, intensity of service provided, expected LOS and risk for adverse outcome make the care complex, high risk and appropriate for hospital admission.        The information on this document is developed by the utilization " review team in order for the business office to ensure compliance.  This only denotes the appropriateness of proper admission status and does not reflect the quality of care rendered.         The definitions of Inpatient Status and Observation Status used in making the determination above are those provided in the CMS Coverage Manual, Chapter 1 and Chapter 6, section 70.4.      Sincerely,     Sandy Payne MD  Physician Advisor   Utilization Review/ Case Management  [facility].

## 2024-02-06 NOTE — PHARMACY-VANCOMYCIN DOSING SERVICE
Pharmacy Vancomycin Note  Date of Service 2024  Patient's  1950   73 year old, male    Indication: Abscess  Day of Therapy: 12  Current vancomycin regimen:  1250 mg IV q24h  Current vancomycin monitoring method: AUC  Current vancomycin therapeutic monitoring goal: 400-600 mg*h/L    [employer]ight[employer] Prediction of Current Vancomycin Regimen    Loading dose: N/A  Regimen: 1250 mg IV every 24 hours.  Start time: 16:00 on 2024  Exposure target: AUC24 (range)400-600 mg/L.hr   AUC24,ss: 410 mg/L.hr  Probability of AUC24 > 400: 57 %  Ctrough,ss: 11.8 mg/L  Probability of Ctrough,ss > 20: 0 %  Probability of nephrotoxicity (Lodise JAYASHREE ): 7 %         Current estimated CrCl = Estimated Creatinine Clearance: 61.1 mL/min (A) (based on SCr of 1.19 mg/dL (H)).    Creatinine for last 3 days  2024:  8:22 AM Creatinine 0.97 mg/dL  2024:  6:18 AM Creatinine 1.19 mg/dL    Recent Vancomycin Levels (past 3 days)  2024:  3:06 PM Vancomycin 12.2 ug/mL    Vancomycin IV Administrations (past 72 hours)        No vancomycin orders with administrations in past 72 hours.                    Nephrotoxins and other renal medications (From now, onward)      Start     Dose/Rate Route Frequency Ordered Stop    24 1600  vancomycin (VANCOCIN) 1,500 mg in sodium chloride 0.9 % 250 mL intermittent infusion         1,500 mg  over 90 Minutes Intravenous EVERY 24 HOURS 24 1551                 Contrast Orders - past 72 hours (72h ago, onward)      Start     Dose/Rate Route Frequency Stop    24 0825  iopamidol (ISOVUE-370) solution 500 mL         500 mL Intravenous ONCE 24 0906            Interpretation of levels and current regimen:  Vancomycin level is reflective of -600    Has serum creatinine changed greater than 50% in last 72 hours: No    Urine output:  unable to determine    Renal Function: Stable    Insight[employer] Prediction of Planned New Vancomycin Regimen   Loading dose: N/A  Regimen: 1500  mg IV every 24 hours.  Start time: 16:00 on 02/06/2024  Exposure target: AUC24 (range)400-600 mg/L.hr   AUC24,ss: 485 mg/L.hr  Probability of AUC24 > 400: 90 %  Ctrough,ss: 13.9 mg/L  Probability of Ctrough,ss > 20: 0 %  Probability of nephrotoxicity (Lodise JAYASHREE 2009): 9 %        Plan:  Increase Dose to 1500mg q24h  Vancomycin monitoring method: AUC  Vancomycin therapeutic monitoring goal: 400-600 mg*h/L  Pharmacy will check vancomycin levels as appropriate in 1-3 Days.  Serum creatinine levels will be ordered daily for the first week of therapy and at least twice weekly for subsequent weeks.    Ranjan Trammell RPH

## 2024-02-06 NOTE — PROGRESS NOTES
PRIMARY DIAGNOSIS: DVT  OUTPATIENT/OBSERVATION GOALS TO BE MET BEFORE DISCHARGE:  1. Anticoagulant treatment initiated: Yes;      2. Diagnostic testing complete (if applicable): Yes    3. Adequate home care or support arranged for LMWH administration: N/A    4. Return to near baseline physical activity: Yes    5. Pain Status: Improved but still requiring IV narcotics.    Discharge Planner Nurse   Safe discharge environment identified: Yes  Barriers to discharge: Yes  Utilizing IV pain medication. Continues to be on heparin gtt.          Entered by: Steffi Mcmahon RN 02/06/2024 6:32 AM     Please review provider order for any additional goals.   Nurse to notify provider when observation goals have been met and patient is ready for discharge.

## 2024-02-06 NOTE — PROGRESS NOTES
"Patient's blood pressure 68/46 MAP 60, pulse 92. Denies feeling lightheaded, dizzy, fatigued. Is alert and oriented, talking. Night provider updated. Ordered 250ml bolus and \"no more narcotics.\"   "

## 2024-02-06 NOTE — PROGRESS NOTES
PRIMARY DIAGNOSIS: DVT  OUTPATIENT/OBSERVATION GOALS TO BE MET BEFORE DISCHARGE:  1. Anticoagulant treatment initiated: Yes;  Heparin Gtt.   2. Diagnostic testing complete (if applicable): Yes    3. Adequate home care or support arranged for LMWH administration: No    4. Return to near baseline physical activity: Yes    5. Pain Status: No improvement noted. Consider adjustment in pain regimen.    Pt reporting increased pain. Noted to be anxious. Provided active listening and reassurance.     Discharge Planner Nurse   Safe discharge environment identified: Yes  Barriers to discharge: Yes       Entered by: Steffi Mcmahon RN 02/06/2024 6:30 AM     Please review provider order for any additional goals.   Nurse to notify provider when observation goals have been met and patient is ready for discharge.

## 2024-02-06 NOTE — PROGRESS NOTES
Clinic Care Coordination Contact  Ambulatory Care Coordination to Inpatient Care Management   Hand-In Communication    Date:  February 6, 2024  Name: Quan Murphy is enrolled in Ambulatory Care Coordination program and I am the Lead Care Coordinator.  CC Contact Information: Epic InDoctor on [employer] + phone  Payor Source: Payor: MEDICARE / Plan: MEDICARE / Product Type: Medicare /   Current services in place:     Please see the CC Snaphot and Care Management Flowsheets for specific  details of this Quan Murphy care plan.   Additional details/specific concerns r/t this admission:    No additional concerns at this time .    I will follow this admission in Epic. Please feel free to contact me with questions or for further collaboration in discharge planning.    Rossana Moore RN Care Coordination   [facility]  Email: [email or web address]  Phone: [phone]

## 2024-02-06 NOTE — CONSULTS
Care Management Initial Consult    General Information  Assessment completed with: EMR review, Patient & Spouse   Type of CM/SW Visit: Offer D/C Planning    Primary Care Provider verified and updated as needed: Yes   Readmission within the last 30 days:  Yes:     1/26/2024 - 1/31/2024 (5 days)  [facility]     Reason for Consult: discharge planning     Elevated risk of readmission    Advance Care Planning: Advance Care Planning Reviewed: present on chart        Communication Assessment  Patient's communication style: spoken language (English or Bilingual)    Hearing Difficulty or Deaf: no   Wear Glasses or Blind: yes    Cognitive  Cognitive/Neuro/Behavioral: WDL                      Living Environment:   People in home: spouse     Current living Arrangements: house      Able to return to prior arrangements: yes       Family/Social Support:  Care provided by: self  Provides care for: no one  Marital Status:   (Wife is an RN)         Description of Support System: Supportive, Involved       Current Resources:   Patient receiving home care services: No  Community Resources: None  Equipment currently used at home: none  Supplies currently used at home: PICC cares/supplies    Employment/Financial:  Employment Status: retired        Financial Concerns: none      Does the patient's insurance plan have a 3 day qualifying hospital stay waiver?  No    Lifestyle & Psychosocial Needs:  Social Determinants of Health     Food Insecurity: Low Risk  (12/28/2023)    Food Insecurity     Within the past 12 months, did you worry that your food would run out before you got money to buy more?: No     Within the past 12 months, did the food you bought just not last and you didn t have money to get more?: No   Depression: Not at risk (1/4/2024)    PHQ-2     PHQ-2 Score: 0   Housing Stability: Low Risk  (12/28/2023)    Housing Stability     Do you have housing? : Yes     Are you worried about losing your  housing?: No   Tobacco Use: Low Risk  (2/5/2024)    Patient History     Smoking Tobacco Use: Never     Smokeless Tobacco Use: Never     Passive Exposure: Not on file   Financial Resource Strain: Low Risk  (12/28/2023)    Financial Resource Strain     Within the past 12 months, have you or your family members you live with been unable to get utilities (heat, electricity) when it was really needed?: No   Alcohol Use: Not on file   Transportation Needs: Low Risk  (12/28/2023)    Transportation Needs     Within the past 12 months, has lack of transportation kept you from medical appointments, getting your medicines, non-medical meetings or appointments, work, or from getting things that you need?: No   Physical Activity: Not on file   Interpersonal Safety: Low Risk  (1/25/2024)    Interpersonal Safety     Do you feel physically and emotionally safe where you currently live?: Yes     Within the past 12 months, have you been hit, slapped, kicked or otherwise physically hurt by someone?: No     Within the past 12 months, have you been humiliated or emotionally abused in other ways by your partner or ex-partner?: No   Stress: Not on file   Social Connections: Not on file       Functional Status:  Prior to admission patient needed assistance:   Dependent ADLs:: Independent  Dependent IADLs:: Independent       Mental Health Status:  Mental Health Status: No Current Concerns       Chemical Dependency Status:  Chemical Dependency Status: No Current Concerns             Values/Beliefs:  Spiritual, Cultural Beliefs, Quaker Practices, Values that affect care: yes (Shital)  Description of Beliefs that Will Affect Care: Shital            Additional Information:  Referral received due to Pt currently receives Home Infusion therapy. Pt also is a recent hospital re-admission. EMR reviewed.     Call placed to [facility]. Confirmed that Pt currently receives their services for IV Vanco.   Receives Q24. Had been set  to end on Feb. 21st.   Intake reported the Pt has a few dose at home and can continue as previous     CM spoke spoke with Pt's wife who is a RN.  She is very involved and aware of Pt's needs. Will be able to support needs on discharge.       Anticipated Discharge plan:  Return home with spouse.     Resume [facility]   P: [phone]  F: [phone]        LYN Schwarz  [facility] [phone]   [facility]  [phone]

## 2024-02-07 PROBLEM — Z96.0 URINARY CATHETER IN PLACE: Status: ACTIVE | Noted: 2024-01-01

## 2024-02-07 PROBLEM — T50.905A ADVERSE DRUG EFFECT: Status: ACTIVE | Noted: 2024-01-01

## 2024-02-07 PROBLEM — Z45.2 PICC (PERIPHERALLY INSERTED CENTRAL CATHETER) IN PLACE: Status: ACTIVE | Noted: 2024-01-01

## 2024-02-07 PROBLEM — E88.09 HYPOALBUMINEMIA: Status: ACTIVE | Noted: 2023-01-01

## 2024-02-07 PROBLEM — E87.20 METABOLIC ACIDOSIS, NORMAL ANION GAP (NAG): Status: ACTIVE | Noted: 2023-01-01

## 2024-02-07 PROBLEM — D69.6 THROMBOCYTOPENIA (H): Status: ACTIVE | Noted: 2024-01-01

## 2024-02-07 PROBLEM — D69.1 ASPIRIN-LIKE PLATELET FUNCTION DEFECT (H): Status: ACTIVE | Noted: 2024-01-01

## 2024-02-07 PROBLEM — T50.905A MEDICATION INDUCED COAGULOPATHY (H): Status: ACTIVE | Noted: 2024-01-01

## 2024-02-07 PROBLEM — D68.9 MEDICATION INDUCED COAGULOPATHY (H): Status: ACTIVE | Noted: 2024-01-01

## 2024-02-07 NOTE — PROGRESS NOTES
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 with more recent mets to liver and bone, sepsis with persistent Entercoccus bacteremia in September 2023, liver abscess in October 2023 currently on chronic IV Vancomycin treatment, recent severe adverse drug reaction to Levaquin, recent left upper extremity DVT from PICC line during recent hospitalization being treated with Lovenox, CAD s/p PCI in remote past, essential HTN, history of prostate cancer s/p prostatectomy in remission, and hypothyroidism who presented with acute onset of abdominal pain and was hospitalized due to concerns for rectal sheath hematoma with active extravasation in a patient on therapeutic anticoagulation.  IR did not recommend hospital transfer for procedure not available at this hospital but rather recommended hospitalization locally for nonprocedural management.      Principal Problem:    Rectus sheath hematoma, initial encounter  Active Problems:    Medication induced coagulopathy  (H24)    Anemia due to blood loss, acute    Hypertension goal BP (blood pressure) < 140/90    Chronic kidney disease, stage 3 (H)    Malignant neoplasm metastatic to bone (H)    Metastatic cancer to liver (H)    History of bacteremia-Enterococcus Sept 2023    S/P ERCP, sphincterotomy with stent placement, lap cholecystectomy 10/16-10/18/23    Hypoalbuminemia    Metabolic acidosis, normal anion gap (NAG)    Liver abscess    Acute deep vein thrombosis (DVT) of axillary vein of left upper extremity (H)    Aspirin-like platelet function defect (H)    Hyperlipidemia LDL goal <130    Coronary atherosclerosis    Status post insertion of drug-eluting stent into left anterior descending artery for coronary artery disease    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  Has recently been treated with therapeutic doses of Lovenox which causes coagulopathy and increases bleeding risk.  Also takes aspirin chronically which causes platelet function defect and increases bleeding risk.  He is now thrombocytopenic further increasing bleeding risk.  He has chronic anemia probably from cancer and/or chemotherapy treatment thereof with baseline hemoglobin 8-10 including at time of recent hospital discharge in January.  Hemoglobin decreased as low as 6.7 on 2/5 at which time he was transfused 1 unit packed red blood cells and therapeutic anticoagulation was also discontinued.  Hemoglobin has subsequently stabilized at 7 and he is clinically improved from his hematoma.  -continue serial hemoglobin checks  -Ordered PRBC transfusion today because of hemoglobin persistently 7 or less  -Discontinue telemetry  -not restarting therapeutic anticoagulation today  -if there is concern for further bleeding, will reconsider hospital transfer for interventional radiology intervention that is not available at this hospital   -hold chronic metoprolol     History of bacteremia-Enterococcus Sept 2023  Liver abscess October 2023  S/P ERCP, sphincterotomy with stent placement, lap cholecystectomy 10/16-10/18/23  PICC line right upper extremity  Treated for Enterococcus bacteremia in September 2023 with ongoing liver abscess since then with ongoing IV vancomycin treatment supervised by [facility] infectious disease provider, Dr. Mckayla Ponce.  Has ongoing PICC line in right  upper extremity for IV antibiotic treatment.  -continue with IV vanco during this hospitalization and at discharge  -Anticipate outpatient follow up appointment with ID and his primary oncology team at the end of February as previously arranged    Acute deep vein thrombosis (DVT) of axillary vein of left upper extremity   Diagnosed with left axillary DVT during recent hospitalization associated with indwelling PICC line in the left arm, trauma to the left shoulder from a fall, and active metastatic cancer.  That PICC line was removed.  DVT had been treated with Lovenox 70 mg every 12 hours up until this hospital admission.  Repeat ultrasound of LUE demonstrated persistent left axillary DVT.  Lovenox was discontinued at admission and he was started on IV heparin infusion.  Due to concern for persistent bleeding, IV heparin infusion was discontinued on 2/6 so he presently is not receiving therapeutic anticoagulation.  -Not recommending restarting therapeutic anticoagulation today, reassess tomorrow    Squamous cell carcinoma of left tonsil  Malignant neoplasm metastatic to bone  Metastatic cancer to liver  Recurrent now metastatic cancer followed by oncology at [facility].  Patient had previously started chemotherapy treatment with carboplatin, paclitaxel and pembrolizumab which has been held for the past few months due to ongoing infection with progression of cancer burden based upon results of repeat imaging.  Patient expresses preference to treat infection and return to his chemo and immunotherapy treatments.     -no acute oncologic intervention during this hospitalization  -Anticipate outpatient follow up with Dr. Falco Molina at [facility] (office is [phone]) presently scheduled for later this month    Chronic metabolic acidosis with normal anion gap  Hyperchloremia  Noted during this hospitalization to have normal anion gap metabolic acidosis as well as hyperchloremia.  Metabolic acidosis appears to be  chronic dating back to at least July 2023.  Hyperchloremia has also been intermittent since that time.  He probably has hyperchloremic metabolic acidosis, but underlying cause for persistent hyperchloremic metabolic acidosis is unclear.  -Ordered recheck of electrolytes and blood gases  -Minimize use of IV fluids containing chloride as able    Urinary retention  Indwelling urinary catheter  Had urinary catheter placed during recent hospitalization in January 2023 due to urinary retention.  Continued use of indwelling urinary catheter was recommended at the time of that hospital discharge until outpatient follow-up with urology this month   -Continue current indwelling urinary catheter, reconsider exchange if there is concern for complication related to this catheter  -Anticipate outpatient follow-up with urology on February 14 as scheduled    Hyperlipidemia LDL goal <130  Chronic hyperlipidemia treated with Crestor 40 mg daily  -continue chronic Crestor without change    Hypertension goal BP (blood pressure) < 140/90  Chronic hypertension treated with metoprolol XL 25 mg daily.  Blood pressure may be lower than his baseline in the context of bleeding with acute blood loss anemia  -Continue to hold metoprolol today    Status post insertion of drug-eluting stent into left anterior descending artery for coronary artery disease over 20 year ago  Known CAD with previous stent in LAD over 20 years ago.  Takes statin, metoprolol, and ASA chronically.  ASA has been held secondary to acute bleeding.  -continue to hold ASA and metoprolol as noted above    Chronic kidney disease, stage 3  Reported diagnosis of stage III chronic kidney disease with baseline creatinine 0.8-1.0.  Renal function has been generally stable so far during this hospitalization  -Ordered recheck renal function    Hypothyroidism due to acquired atrophy of thyroid  Known chronic hypothyroidism treated with levothyroxine 88 mcg daily   -continue chronic  "dose of levothyroxine           Diet: Regular Diet Adult    DVT Prophylaxis: Pneumatic Compression Devices  Stone Catheter: PRESENT, indication:    Lines: PRESENT      PICC 01/30/24 Single Lumen Right Basilic-Site Assessment: WDL      Cardiac Monitoring: ACTIVE order. Indication: to monitor for bleeding  Code Status: Full Code      Clinically Significant Risk Factors Present on Admission              # Hypoalbuminemia: Lowest albumin = 2.5 g/dL at 2/6/2024  6:18 AM, will monitor as appropriate  # Drug Induced Coagulation Defect: home medication list includes an anticoagulant medication  # Drug Induced Platelet Defect: home medication list includes an antiplatelet medication   # Hypertension: Noted on problem list      # Obesity: Estimated body mass index is 30.11 kg/m  as calculated from the following:    Height as of this encounter: 1.727 m (5' 8\").    Weight as of this encounter: 89.8 kg (198 lb).       # Financial/Environmental Concerns: none         Disposition Plan     Expected Discharge Date: 02/08/2024      Destination: home with family;home with help/services              Pedro Guillaume MD  Hospitalist Service  [facility]  Securely message with [employer] (more info)  Text page via [facility] Paging/Directory   ______________________________________________________________________    Interval History   Last evening he developed worsening anemia with fall and hemoglobin as low as 6.7.  He was transfused 1 unit PRBCs uneventfully.  He says he feels better today.  He has less abdominal pain.  They are noticing some new bruising in his left flank area although he has no pain in that area.  He denies any dizziness although has not yet been out much today.  He remains afebrile and hemodynamically stable.  Oxygenation has been normal.  He has had good urine output through his urinary catheter.  He is tolerating oral intake.    Physical Exam   Vital Signs: Temp: 97.9  F (36.6  C) Temp src: " Oral BP: 108/51 Pulse: 75   Resp: 18 SpO2: 100 % O2 Device: None (Room air)    Patient Vitals for the past 24 hrs:   BP Temp Temp src Pulse Resp SpO2   02/07/24 1058 108/51 97.9  F (36.6  C) Oral 75 18 100 %   02/07/24 0726 116/53 98  F (36.7  C) Oral 85 18 99 %   02/07/24 0612 -- 97.8  F (36.6  C) -- -- -- --   02/07/24 0220 113/66 97.5  F (36.4  C) Oral 75 18 95 %   02/06/24 2227 (!) 142/72 97.5  F (36.4  C) -- 67 18 98 %   02/06/24 2057 (!) 146/71 97.4  F (36.3  C) Oral 66 20 98 %   02/06/24 1956 117/59 97.5  F (36.4  C) Oral 67 20 99 %   02/06/24 1934 115/51 97.5  F (36.4  C) Oral 65 20 98 %   02/06/24 1929 115/51 97.5  F (36.4  C) Oral 65 20 98 %   02/06/24 1822 116/56 97.4  F (36.3  C) Oral 70 20 100 %   02/06/24 1513 104/55 97.8  F (36.6  C) Oral 68 20 --     Weight: 198 lbs 0 oz  Vitals:    02/05/24 0734 02/05/24 1642   Weight: 93.1 kg (205 lb 4.8 oz) 89.8 kg (198 lb)       Intake/Output Summary (Last 24 hours) at 2/7/2024 1217  Last data filed at 2/7/2024 0839  Gross per 24 hour   Intake 840 ml   Output 1325 ml   Net -485 ml       General Appearance: Obese, elderly man, NAD lying in bed  Respiratory: normal effort, clear lungs  Cardiovascular: RRR, brisk cap refill, peripheral edema in all limbs though left UE is larger than right  GI: obese, soft, non-tender, palpable hematoma left lower abdomen with ecchymosis left flank     Medical Decision Making       68 MINUTES SPENT BY ME on the date of service doing chart review, history, exam, documentation & further activities per the note.      Data     I have personally reviewed the following data over the past 24 hrs:    4.6  \   7.4 (L)   / 124 (L)     141 115 (H) 15.4 /  82   3.8 16 (L) 1.08 \     ALT: 119 (H) AST: 112 (H) AP: 209 (H) TBILI: 0.8   ALB: 2.6 (L) TOT PROTEIN: 5.2 (L) LIPASE: N/A     INR:  N/A PTT:  83 (H)   D-dimer:  N/A Fibrinogen:  N/A       Imaging results reviewed over the past 24 hrs:   Recent Results (from the past 24 hour(s))   US Upper  Extremity Venous Duplex Left    Narrative    US UPPER EXTREMITY VENOUS DUPLEX LEFT  2/6/2024 1:01 PM     HISTORY: left arm swelling, history of DVT in the left upper extremity    COMPARISON: 1/31/2024    FINDINGS: Color Doppler and spectral waveform analysis performed.    Again identified is thrombus in the left internal jugular vein  extending into the subclavian vein, axillary vein, and brachial veins.  This is not significantly changed.    Previously seen thrombus in the left basilic vein has improved. There  is persistent thrombus in the proximal basilic vein. Previously seen  thrombus in the mid and distal basilic vein has resolved.      Impression    IMPRESSION: No significant change in DVT in the left upper extremity.  Improvement in superficial venous thrombus in the left basilic vein.      MALLORY MCLEAN MD         SYSTEM ID:  [id number]   US Lower Extremity Venous Duplex Bilateral    Narrative    VENOUS ULTRASOUND BOTH LEGS  2/6/2024 1:02 PM     HISTORY: (B)LE edema    COMPARISON: None.    FINDINGS:  Examination of the deep veins with graded compression and  color flow Doppler with spectral wave form analysis was performed.   There is no evidence for DVT in the lower extremities.      Impression    IMPRESSION: No evidence of deep venous thrombosis.    MALLORY MCLEAN MD         SYSTEM ID:  [id number]     Recent Labs   Lab 02/07/24  0956 02/07/24  0600 02/07/24  0216 02/06/24  0959 02/06/24  0618 02/06/24  0026 02/06/24  0009 02/05/24  1826 02/05/24  0822   WBC  --  4.6  --   --  4.5  --   --  5.3 4.1   HGB 7.4* 7.6* 7.5*   < > 7.9*   < >  --  8.5*  8.6* 8.5*   MCV  --  87  --   --  86  --   --  85 85   PLT  --  124*  --   --  144*  --   --  157 147*   NA  --  141  --   --  140  140  --   --   --  142   POTASSIUM  --  3.8  --   --  4.3  4.3  --  4.1 3.4 3.4   CHLORIDE  --  115*  --   --  114*  114*  --   --   --  114*   CO2  --  16*  --   --  17*  17*  --   --   --  19*   BUN  --  15.4  --   --  13.2   --   --   --  12.7   CR  --  1.08  --   --  1.19*  --   --   --  0.97   ANIONGAP  --  10  --   --  9  9  --   --   --  9   LATRELL  --  7.8*  --   --  7.5*  --   --   --  8.1*   GLC  --  82  --   --  83  --   --   --  91   ALBUMIN  --  2.6*  --   --  2.5*  --   --   --  2.8*   PROTTOTAL  --  5.2*  --   --  5.1*  --   --   --  5.4*   BILITOTAL  --  0.8  --   --  0.5  --   --   --  0.3   ALKPHOS  --  209*  --   --  224*  --   --   --  244*   ALT  --  119*  --   --  130*  --   --   --  155*   AST  --  112*  --   --  118*  --   --   --  183*    < > = values in this interval not displayed.

## 2024-02-07 NOTE — PLAN OF CARE
Goal Outcome Evaluation:           Overall Patient Progress: no changeOverall Patient Progress: no change    Outcome Evaluation: Pt A&Ox4. Reports pain being more controlled today. Received oxycodone, tylenol, atarax and Ambien. Hgb at 1900 6.7. 1 unit of blood given. No transfusion reactions. Recheck of hgb 1 hour after was 7.7. 0200 hgb was 7.5, 0600 hgb 7.6. B/P slightly elevated 140s systolic during transfusion but did come back down. All other VSS. ABD binder in place. NS running at TKO.

## 2024-02-07 NOTE — PROGRESS NOTES
SPIRITUAL HEALTH SERVICES Progress Note    Medical Surgical Unit at [facility].      REFERRAL SOURCE/REASON FOR VISIT - Saw patient per patient request at admission    SUMMARY - I visited New & his wife.  Both were open to my introduction and to emotional and spiritual support.  I offered them a blessing.         Plan - I will continue to follow patient and be available for any ongoing support needs until his discharge.     Ole Giorn, Ph.D, [facility]  [address] [place], [place] [address] [phone]  [email or web address]    Assessment -     Patient/Family Understanding of Illness and Goals of Care - Patient seems to understand his illness and goals.  He reported that his caregivers here are trying to find a balance with a couple of medical issues he has.    Meaning, Beliefs, and Spirituality - New and his wife are members of [employer], in [place].  They anticipate their  visiting today.

## 2024-02-07 NOTE — PLAN OF CARE
Goal Outcome Evaluation:      Plan of Care Reviewed With: patient    Overall Patient Progress: no changeOverall Patient Progress: no change    Outcome Evaluation: patient a&o, PRN oxy, Tylenol, and atarax given, ambulated halls, dressing change for PICC completed, mcdermott in place, low hgb charge nurse and provider notified, blood transfusion ordered, heparin drip stopped, US done on legs and left arm

## 2024-02-07 NOTE — PROGRESS NOTES
S-(situation): Patient changed to inpatient status    B-(background): Patient status change due to observation goals not being met.     A-(assessment): Pt A&Ox4. Pain better controlled with change in pain medication schedule. Hgb 6.9. Provider ordered 1 unit of blood. Hgb labs q4hrs. Currently transfusing. Lungs clear. Stone patent and intact. Up with SBA.     R-(recommendations):  Will monitor patient per MD orders.       Inpatient nursing criteria listed below were met:    Adult Profile completedYes  Health care directives status obtained and documented: Yes  VTE prophylaxis orders: No  (SCD's Documented: Yes  Vaccine assessment done and vaccine ordered if needed. Yes  My Chart patient sign up addressed and documented: Yes  Care Plan initiated: Yes  Discharge planning review completed (admission navigator) Yes

## 2024-02-08 NOTE — PROGRESS NOTES
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 with more recent mets to liver and bone, sepsis with persistent Entercoccus bacteremia in September 2023, liver abscess in October 2023 currently on chronic IV Vancomycin treatment, recent severe adverse drug reaction to Levaquin, recent left upper extremity DVT from PICC line during recent hospitalization being treated with Lovenox, CAD s/p PCI in remote past, essential HTN, history of prostate cancer s/p prostatectomy in remission, and hypothyroidism who presented with acute onset of abdominal pain and was hospitalized due to concerns for rectal sheath hematoma with active extravasation in a patient on therapeutic anticoagulation.  IR did not recommend hospital transfer for procedure not available at this hospital but rather recommended hospitalization locally for nonprocedural management.      Rectus sheath hematoma, initial encounter  Medication induced coagulopathy  Aspirin induced platelet function defect  Thrombocytopenia  Acute blood loss anemia superimposed on chronic anemia  Rectus sheath hematoma occurred spontaneously with active extravasation on imaging at admission.  Has recently been treated with therapeutic doses of Lovenox which causes coagulopathy and increases bleeding risk.  Despite stopping Lovenox at admission and stopping IV heparin on 2/6, INR remains mildly prolonged suspicious for underlying coagulopathy.  Also takes aspirin chronically which causes platelet function defect and increases bleeding risk, but aspirin has been held during hospitalization.  He became acutely thrombocytopenic during this hospitalization further increasing bleeding risk.  Thrombocytopenia is now trending toward improvement.  He has chronic anemia  probably from cancer and/or chemotherapy treatment thereof with baseline hemoglobin 8-10 including at time of recent hospital discharge in January.  Hemoglobin decreased as low as 6.7 on 2/5 at which time he was transfused 1 unit packed red blood cells and therapeutic anticoagulation was also discontinued.  He was transfused a second unit PRBCs on 2/7 since which time his hemoglobin has improved to 8 and he is clinically improved from his hematoma and anemia.  -continue serial hemoglobin checks every 12 hours  -not restarting therapeutic anticoagulation today but discussed restarting IV heparin infusion tomorrow if he remains clinically improved with stable hemoglobin  -if there is concern for recurrent bleeding, will reconsider hospital transfer for interventional radiology intervention that is not available at this hospital     History of bacteremia-Enterococcus Sept 2023  Liver abscess October 2023  S/P ERCP, sphincterotomy with stent placement, lap cholecystectomy 10/16-10/18/23  PICC line right upper extremity  Treated for Enterococcus bacteremia in September 2023 with ongoing liver abscess since then with ongoing IV vancomycin treatment supervised by [place] infectious disease provider, Dr. Mckayla Ponce.  Has ongoing PICC line in right upper extremity for IV antibiotic treatment.  -continue with IV vanco during this hospitalization and at discharge  -Anticipate outpatient follow up appointment with ID and his primary oncology team at [place] at the end of February as previously arranged    Acute deep vein thrombosis (DVT) of axillary vein of left upper extremity   Diagnosed with left axillary DVT during recent hospitalization associated with indwelling PICC line in the left arm, trauma to the left shoulder from a fall, and active metastatic cancer.  That PICC line was removed.  DVT had been treated with Lovenox 70 mg every 12 hours up until this hospital admission.  Repeat ultrasound of LUE demonstrated  persistent left axillary DVT.  Lovenox was discontinued at admission and he was started on IV heparin infusion.  Due to concern for persistent bleeding, IV heparin infusion was discontinued on 2/6 so he presently is not receiving therapeutic anticoagulation.  -Not recommending restarting therapeutic anticoagulation today, reassess tomorrow    Squamous cell carcinoma of left tonsil  Malignant neoplasm metastatic to bone  Metastatic cancer to liver  Recurrent now metastatic cancer followed by oncology at [facility].  Patient had previously started chemotherapy treatment with carboplatin, paclitaxel and pembrolizumab which has been held for the past few months due to ongoing infection with progression of cancer burden based upon results of repeat imaging.  Patient expresses preference to treat infection and return to his chemo and immunotherapy treatments.     -no acute oncologic intervention during this hospitalization  -Anticipate outpatient follow up with Dr. Flaco Molina at [facility] (office is [phone]) presently scheduled for later this month    Chronic metabolic acidosis with normal anion gap  Hyperchloremia  Noted during this hospitalization to have normal anion gap metabolic acidosis as well as hyperchloremia.  Metabolic acidosis appears to be chronic dating back to at least July 2023.  Hyperchloremia has also been intermittent since that time.  He probably has chronic hyperchloremic metabolic acidosis.  -Ordered recheck of electrolytes and blood gases  -Minimize use of IV fluids containing chloride as able    Urinary retention  Indwelling urinary catheter  Had urinary catheter placed during recent hospitalization in January 2023 due to urinary retention.  Continued use of indwelling urinary catheter was recommended at the time of that hospital discharge until outpatient follow-up with urology this month   -Continue current indwelling urinary catheter, reconsider exchange if there is concern for  complication related to this catheter  -Anticipate outpatient follow-up with urology on February 14 as scheduled    Hypoalbuminemia  Serum albumin was low at 2.8 at admission and had also been low on January 31.  Inadequate nutritional protein intake is suspected although recent adverse drug reaction may also contribute.  Hypoalbuminemia increases his risk for volume overload and peripheral edema.  -Monitor albumin as indicated  -Reconsider IV albumin administration if there is increasing concern for volume overload or to support hemodynamic status if necessary    Adverse drug reaction to levofloxacin  He developed adverse drug reaction to levofloxacin during last hospitalization in January 2023.  Signs of that adverse drug reaction including skin rash are gradually improving.  -Continuing prednisone to complete treatment course as previously recommended for this adverse drug reaction  -Continuing antihistamines as needed for symptomatic relief of itching    Hyperlipidemia LDL goal <130  Chronic hyperlipidemia treated with Crestor 40 mg daily  -continue chronic Crestor without change    Hypertension goal BP (blood pressure) < 140/90  Chronic hypertension treated with metoprolol XL 25 mg daily.  Blood pressure may be lower than his baseline in the context of bleeding with acute blood loss anemia  -Continue to hold metoprolol today    Status post insertion of drug-eluting stent into left anterior descending artery for coronary artery disease over 20 year ago  Known CAD with previous stent in LAD over 20 years ago.  Takes statin, metoprolol, and ASA chronically.  ASA has been held secondary to acute bleeding.  -continue to hold ASA and metoprolol as noted above    Chronic kidney disease, stage 3  Reported diagnosis of stage III chronic kidney disease with baseline creatinine 0.8-1.0.  Renal function has been generally stable so far during this hospitalization  -Ordered recheck renal function    Hypothyroidism due to  "acquired atrophy of thyroid  Known chronic hypothyroidism treated with levothyroxine 88 mcg daily   -continue chronic dose of levothyroxine     Obesity versus overweight  Appears overweight versus obese with BMI 30 although current BMI may be overestimated because he continues to have some signs of volume overload.  -No acute intervention          Diet: Regular Diet Adult    DVT Prophylaxis: Pneumatic Compression Devices  Stone Catheter: PRESENT, indication:    Lines: PRESENT      PICC 01/30/24 Single Lumen Right Basilic-Site Assessment: WDL      Cardiac Monitoring: None  Code Status: Full Code      Clinically Significant Risk Factors              # Hypoalbuminemia: Lowest albumin = 2.5 g/dL at 2/6/2024  6:18 AM, will monitor as appropriate  # Coagulation Defect: INR = 1.31 (Ref range: 0.85 - 1.15) and/or PTT = 36 Seconds (Ref range: 22 - 38 Seconds), will monitor for bleeding  # Thrombocytopenia: Lowest platelets = 124 in last 2 days, will monitor for bleeding   # Hypertension: Noted on problem list        # Obesity: Estimated body mass index is 30.11 kg/m  as calculated from the following:    Height as of this encounter: 1.727 m (5' 8\").    Weight as of this encounter: 89.8 kg (198 lb)., PRESENT ON ADMISSION     # Financial/Environmental Concerns: none         Disposition Plan     Expected Discharge Date: 02/09/2024      Destination: home with family;home with help/services              Pedro Guillaume MD  Hospitalist Service  [facility]  Securely message with [employer] (more info)  Text page via [employer] Paging/Directory   ______________________________________________________________________    Interval History   There were no significant overnight events.  He thinks he feels stronger after his second blood transfusion yesterday.  He slept better last night and today.  His abdominal pain is about the same but not any worse.  Oral analgesics are relieving the abdominal pain.  The " abdominal binder also helps to reduce the abdominal pain.  He is noticing increasing bruising in his left flank area and in the left groin extending to the scrotum although has no pain in that area.  He remains afebrile and hemodynamically stable.  Oxygenation remains normal.  Urine output remains good.  He is tolerating oral intake.  He is starting to advance ambulation.    Physical Exam   Vital Signs: Temp: 98.2  F (36.8  C) Temp src: Oral BP: 113/52 Pulse: 82   Resp: 16 SpO2: 99 % O2 Device: None (Room air)    Patient Vitals for the past 24 hrs:   BP Temp Temp src Pulse Resp SpO2   02/08/24 1112 113/52 98.2  F (36.8  C) Oral 82 16 99 %   02/08/24 0925 128/65 97.8  F (36.6  C) Oral 80 20 99 %   02/08/24 0239 109/56 97.9  F (36.6  C) Oral 86 18 98 %   02/07/24 2346 133/57 99  F (37.2  C) Oral 82 18 97 %   02/07/24 2029 (!) 137/113 98.5  F (36.9  C) -- 78 18 98 %   02/07/24 1942 119/61 97.8  F (36.6  C) Oral 83 18 99 %   02/07/24 1820 108/61 97.3  F (36.3  C) Oral 68 16 98 %   02/07/24 1800 126/66 97.5  F (36.4  C) Oral 68 18 98 %   02/07/24 1515 116/48 -- -- 72 -- 99 %   02/07/24 1512 116/48 97.7  F (36.5  C) -- 75 20 100 %       Weight: 198 lbs 0 oz Body mass index is 30.11 kg/m .    Intake/Output Summary (Last 24 hours) at 2/8/2024 1500  Last data filed at 2/8/2024 1413  Gross per 24 hour   Intake 0 ml   Output 1675 ml   Net -1675 ml     General Appearance: No acute distress resting in bed  Respiratory: Normal respiratory effort, clear lungs  Cardiovascular: Regular rate and rhythm, good radial pulse, brisk capillary refill, moderate peripheral edema in the lower legs  GI: Firm slightly tender palpable mass in the superficial soft tissues of the left lower anterior abdominal wall and also in the left flank without fluctuance  Skin: Visible ecchymosis in the left flank, no skin erythema in the abdominal wall or flank     Medical Decision Making             Data     I have personally reviewed the following data  over the past 24 hrs:    N/A  \   8.2 (L)   / 128 (L)     140 115 (H) 14.1 /  74   4.1 17 (L) 1.08 \     INR:  1.31 (H) PTT:  36   D-dimer:  N/A Fibrinogen:  N/A           Recent Labs   Lab 02/08/24  0531 02/07/24  1424 02/07/24  0956 02/07/24  0600 02/06/24  0959 02/06/24  0618 02/06/24  0009 02/05/24  1826   WBC  --   --   --  4.6  --  4.5  --  5.3   HGB 8.2* 7.3* 7.4* 7.6*   < > 7.9*   < > 8.5*  8.6*   MCV  --   --   --  87  --  86  --  85   *  --   --  124*  --  144*  --  157   INR 1.31*  --   --   --   --   --   --   --     142  --  141  --  140  140  --   --    POTASSIUM 4.1 3.9  --  3.8  --  4.3  4.3   < > 3.4   CHLORIDE 115* 114*  --  115*  --  114*  114*  --   --    CO2 17* 20*  --  16*  --  17*  17*  --   --    BUN 14.1  --   --  15.4  --  13.2  --   --    CR 1.08  --   --  1.08  --  1.19*  --   --    ANIONGAP 8 8  --  10  --  9  9  --   --    LATRELL 7.8*  --   --  7.8*  --  7.5*  --   --    GLC 74  --   --  82  --  83  --   --    ALBUMIN  --   --   --  2.6*  --  2.5*  --   --    PROTTOTAL  --   --   --  5.2*  --  5.1*  --   --    BILITOTAL  --   --   --  0.8  --  0.5  --   --    ALKPHOS  --   --   --  209*  --  224*  --   --    ALT  --   --   --  119*  --  130*  --   --    AST  --   --   --  112*  --  118*  --   --     < > = values in this interval not displayed.

## 2024-02-08 NOTE — PLAN OF CARE
Goal Outcome Evaluation:      Plan of Care Reviewed With: patient    Overall Patient Progress: no changeOverall Patient Progress: no change    Outcome Evaluation: Pt is A&O.  VSS.  States has pain on left back/side area.  Was medicated and this was effective.  Hgb continues to drop - blood transfusion ordered.  He states does not have much energy, hoping blood transfusion will perk him up.  Wife here helping with most of his cares.

## 2024-02-08 NOTE — PLAN OF CARE
Goal Outcome Evaluation:      Plan of Care Reviewed With: patient    Overall Patient Progress: no changeOverall Patient Progress: no change    Outcome Evaluation: Pt A/Ox4. VSS on RA. Pain to left abdomen/flank area given PRN Oxycodone and Tylenol. Atarax given for itchiness. Ambien given for sleep. Blood tranfusion completed last evening, pt reports feeling better. Had a slight elevated BP in the 130's systolic during infusion. No reaction from transfusion. Hgb recheck this morning at 8.2. LR infusing at 10 ml/hr for TKO. Pt is SBA. Stone with adequate urine output. ABD binder in place. Pt has not had a BM for the past few days per patient. Given Senna x2.

## 2024-02-08 NOTE — PROGRESS NOTES
Care Management Follow Up    Length of Stay (days): 2    Expected Discharge Date: 02/09/2024     Concerns to be Addressed: discharge planning     Patient plan of care discussed at interdisciplinary rounds: Yes    Anticipated Discharge Disposition: Home Infusion  Disposition Comments: Return home with resumption of [facility]  Anticipated Discharge Services: None  Anticipated Discharge DME: None    Patient/family educated on Medicare website which has current facility and service quality ratings: yes  Education Provided on the Discharge Plan: No  Patient/Family in Agreement with the Plan: yes    Referrals Placed by CM/SW: Internal Clinic Care Coordination  Private pay costs discussed: Not applicable    Additional Information:  Received call from Jeremias Thao, [facility] [phone].  Patient is currently receiving Vancomycin through [facility]. Jeremias would like an update when patient is ready for discharge. Phone or email [email or web address]      LYN Benjamin  [facility] [phone]/ [facility] [phone]  Care Management

## 2024-02-08 NOTE — PLAN OF CARE
Goal Outcome Evaluation:      Plan of Care Reviewed With: patient, spouse    Overall Patient Progress: no changeOverall Patient Progress: no change    Outcome Evaluation: Pt is A & O x 4, VSS, afebrile, on room air. Prn oxycodone and tylenol given x 1 for left abdominal pain and atarax x 1 for itching. Lung sounds CTA, bowels normal active, x 1 BM today. Good urine output via mcdermott catheter. Wife at bedside most of day. Potassium WNL and to be rechecked in AM per protocol. LR continues to infuse at TKO in PICC line.

## 2024-02-09 NOTE — PROGRESS NOTES
Care Management Follow Up    Length of Stay (days): 3    Expected Discharge Date: 02/09/2024     Concerns to be Addressed: discharge planning       Patient plan of care discussed at interdisciplinary rounds: Yes    Anticipated Discharge Disposition: Home Infusion  Disposition Comments: Return home with resumption of [facility]    Anticipated Discharge Services: None  Anticipated Discharge DME: None    Patient/family educated on Medicare website which has current facility and service quality ratings: yes    Education Provided on the Discharge Plan: No    Patient/Family in Agreement with the Plan: yes    Referrals Placed by CM/SW: Internal Clinic Care Coordination    Private pay costs discussed: Not applicable    Additional Information:  Per morning rounds, anticipate patient will be medically ready for discharge tomorrow.    Spoke with Jeremias Thao, at [facility], [phone], to inform her or anticipated discharge date.    Please call [facility] either way tomorrow to notify them of discharge.  [phone] (weekend - ask for the on-call pharmacist). There is no need to fax orders, as they can pull orders from Epic.    Plan is to continue IV-Vanco Q24 and Lovenox at home as was doing prior to hospitalization.    Wife, Alessandra, is a RN, and capable of assisting patient with cares.    High Risk - PCP follow up appt on 2/21/24.    Family transport.    LYN Benjamin  [facility] [phone]/ [place] [phone]  Care Management

## 2024-02-09 NOTE — PROGRESS NOTES
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 with more recent mets to liver and bone, sepsis with persistent Entercoccus bacteremia in September 2023, liver abscess in October 2023 currently on chronic IV Vancomycin treatment, recent severe adverse drug reaction to Levaquin, recent left upper extremity DVT from PICC line during recent hospitalization being treated with Lovenox, CAD s/p PCI in remote past, essential HTN, history of prostate cancer s/p prostatectomy in remission, and hypothyroidism who presented with acute onset of abdominal pain and was hospitalized due to concerns for rectal sheath hematoma with active extravasation in a patient on therapeutic anticoagulation.  IR did not recommend hospital transfer for procedure not available at this hospital but rather recommended hospitalization locally for nonprocedural management.      Rectus sheath hematoma, initial encounter  Medication induced coagulopathy  Aspirin induced platelet function defect  Thrombocytopenia  Acute blood loss anemia superimposed on chronic anemia  Rectus sheath hematoma occurred spontaneously with active extravasation on imaging at admission.  He started therapeutic doses of Lovenox on 1/31 which causes coagulopathy and increases bleeding risk.  Despite stopping Lovenox at admission and stopping IV heparin on 2/6, INR remains mildly prolonged suspicious for possible underlying coagulopathy.  Also takes aspirin chronically which causes platelet function defect and increases bleeding risk, but aspirin has been held during hospitalization.  He became acutely thrombocytopenic during this hospitalization further increasing bleeding risk.  Thrombocytopenia has now resolved and was probably due to consumption although HIT  has not been completely excluded.  He has chronic anemia probably from cancer and/or chemotherapy treatment thereof with baseline hemoglobin 8-10 including at time of recent hospital discharge in January.  Hemoglobin decreased as low as 6.7 on 2/5 at which time he was transfused 1 unit packed red blood cells and therapeutic anticoagulation was also discontinued.  He was transfused a second unit PRBCs on 2/7 since which time his hemoglobin has improved to 8 and stabilized for over 24 hours.  Active bleeding is no longer suspected and he is clinically improved from his hematoma and anemia.  -continue serial hemoglobin checks every 12 hours  -restart therapeutic anticoagulation today with IV heparin infusion low intensity without boluses due to recent severe bleeding and with anti-Xa monitoring  -if he tolerates IV heparin infusion over the next 24 hours without bleeding or significantly worsening thrombocytopenia, anticipate transition to therapeutic Lovenox tomorrow for discharge    History of bacteremia-Enterococcus Sept 2023  Liver abscess October 2023  S/P ERCP, sphincterotomy with stent placement, lap cholecystectomy 10/16-10/18/23  PICC line right upper extremity  Treated for Enterococcus bacteremia in September 2023 with ongoing liver abscess since then with ongoing IV vancomycin treatment supervised by [facility] infectious disease provider, Dr. Mckayla Ponce.  Has ongoing PICC line in right upper extremity for IV antibiotic treatment.  -continue with IV vanco during this hospitalization and at discharge until outpatient follow up appointment with ID at [facility] at the end of February as previously arranged    Acute deep vein thrombosis (DVT) of axillary vein of left upper extremity   Diagnosed with left axillary DVT during recent hospitalization associated with indwelling PICC line in the left arm, trauma to the left shoulder from a fall, and active metastatic cancer.  That PICC line was removed.  DVT had been  treated with Lovenox 70 mg every 12 hours up until this hospital admission.  Repeat ultrasound of LUE demonstrated persistent left axillary DVT.  Lovenox was discontinued at admission and he was started on IV heparin infusion.  Due to concern for persistent bleeding, IV heparin infusion was discontinued on 2/6 so he presently is not receiving therapeutic anticoagulation.  -recommend restarting therapeutic anticoagulation today as above  -defer recommendation of duration of anticoagulation treatment to his primary Oncologist    Squamous cell carcinoma of left tonsil  Malignant neoplasm metastatic to bone  Metastatic cancer to liver  Cancer-related pain  Recurrent now metastatic cancer followed by oncology at [facility].  Patient had previously started chemotherapy treatment with carboplatin, paclitaxel and pembrolizumab which has been held for the past few months due to ongoing infection with progression of cancer burden based upon results of repeat imaging.  Patient expresses preference to treat infection and return to his chemo and immunotherapy treatments.  CT this admission demonstrated worsening hepatic metastases. He also has had increased cancer-related pain now using 10 mg oxycodone every 6 hours as needed up to 8 tabs in 24 hours (previously had used up to 6 abs in 24 hours)  -no acute oncologic intervention during this hospitalization  -Anticipate outpatient follow up with Dr. Flaco Molina at [facility] (office is [phone]) presently scheduled for later this month  -ordered (and printed and signed) new acute short-term oxycodone prescription for discharge reflecting higher dosage of 10 mg every 6 hours as needed up to max of 8 tabs in 24 hours (total 24 tabs) until he can follow up with PCP on 2/13 after discharge for further refills, script given to nursing today to give to the patient and his wife    Chronic metabolic acidosis with normal anion gap  Hyperchloremia  Noted during this hospitalization  to have normal anion gap metabolic acidosis as well as hyperchloremia.  Metabolic acidosis appears to be chronic dating back to at least July 2023.  Hyperchloremia has also been intermittent since that time.  He probably has chronic hyperchloremic metabolic acidosis.  -Ordered recheck of electrolytes and blood gases  -Minimize use of IV fluids containing chloride as able    Urinary retention  Indwelling urinary catheter  Had urinary catheter placed during recent hospitalization in January 2023 due to urinary retention.  Continued use of indwelling urinary catheter was recommended at the time of that hospital discharge until outpatient follow-up with urology this month   -Continue current indwelling urinary catheter, reconsider exchange if there is concern for complication related to this catheter  -Anticipate outpatient follow-up with urology on February 14 as scheduled    Hypoalbuminemia  Serum albumin was low at 2.8 at admission and had also been low on January 31.  Inadequate nutritional protein intake is suspected although recent adverse drug reaction may also contribute.  Hypoalbuminemia increases his risk for volume overload and peripheral edema.  -Monitor albumin as indicated  -Reconsider IV albumin administration if there is increasing concern for volume overload or to support hemodynamic status if necessary    Adverse drug reaction to levofloxacin  He developed adverse drug reaction to levofloxacin (non-urticarial rash) during recent hospitalization in January 2023.  Signs of that adverse drug reaction including skin rash are gradually improving and he has now completed prednisone treatment course.  -Continuing antihistamines as needed for symptomatic relief of itching    Hyperlipidemia LDL goal <130  Chronic hyperlipidemia treated with Crestor 40 mg daily  -continue chronic Crestor without change    Hypertension goal BP (blood pressure) < 140/90  Chronic hypertension treated with metoprolol XL 25 mg daily.   Blood pressure has been lower than his baseline in the context of bleeding with acute blood loss anemia but is starting to trend up while holding Toprol XL  -resume metoprolol XL today but at lower dose of 12.5 mg daily    Status post insertion of drug-eluting stent into left anterior descending artery for coronary artery disease over 20 year ago  Known CAD with previous stent in LAD over 20 years ago.  Takes statin, metoprolol, and ASA chronically.  ASA has been held secondary to acute bleeding.  -continue to hold ASA as noted above  -restart beta blocker and continue statin    Chronic kidney disease, stage 3  Reported diagnosis of stage III chronic kidney disease with baseline creatinine 0.8-1.0.  Renal function has been generally stable so far during this hospitalization  -Ordered recheck renal function    Hypothyroidism due to acquired atrophy of thyroid  Known chronic hypothyroidism treated with levothyroxine 88 mcg daily   -continue chronic dose of levothyroxine     Obesity versus overweight  Appears overweight versus obese with BMI 30 although current BMI may be overestimated because he continues to have some signs of volume overload.  -No acute intervention          Diet: Regular Diet Adult    DVT Prophylaxis: Pneumatic Compression Devices  Stone Catheter: PRESENT, indication:    Lines: PRESENT      PICC 01/30/24 Single Lumen Right Basilic-Site Assessment: WDL      Cardiac Monitoring: None  Code Status: Full Code      Clinically Significant Risk Factors              # Hypoalbuminemia: Lowest albumin = 2.5 g/dL at 2/6/2024  6:18 AM, will monitor as appropriate  # Coagulation Defect: INR = 1.26 (Ref range: 0.85 - 1.15) and/or PTT = 35 Seconds (Ref range: 22 - 38 Seconds), will monitor for bleeding  # Thrombocytopenia: Lowest platelets = 128 in last 2 days, will monitor for bleeding   # Hypertension: Noted on problem list        # Obesity: Estimated body mass index is 30.11 kg/m  as calculated from the  "following:    Height as of this encounter: 1.727 m (5' 8\").    Weight as of this encounter: 89.8 kg (198 lb)., PRESENT ON ADMISSION     # Financial/Environmental Concerns: none         Disposition Plan     Expected Discharge Date: 02/09/2024      Destination: home with family;home with help/services              Pedro Guillaume MD  Hospitalist Service  [facility]  Securely message with [place] (more info)  Text page via [facility] Paging/Directory   ______________________________________________________________________    Interval History   There were no significant overnight events.  He says he feels better overall today.  He really has minimal lingering abdominal pain and flank pain in the location of his hematoma.  He continues to have cancer related pain which has worsened in recent weeks.  He is finding that he has had to take oxycodone 10 mg every 6 hours more reliably.  He is worried that he may not have enough oxycodone tablets at home after hospital discharge.  Previously, his PCP had prescribed oxycodone 5 mg tablets not to use more than 6 tablets within 24 hours.  However, while taking 10 mg every 6 hours, he is now using up to 8 tablets within 24 hours.  His skin is itchy but he thinks his rash overall is improving.  He remains afebrile and hemodynamically stable with normal oxygenation.  He is voiding spontaneously with good urine output.  He is tolerating advancing physical activity.    Physical Exam   Vital Signs: Temp: 98.1  F (36.7  C) Temp src: Oral BP: 120/64 Pulse: 81   Resp: 17 SpO2: 96 % O2 Device: None (Room air)    Patient Vitals for the past 24 hrs:   BP Temp Temp src Pulse Resp SpO2   02/09/24 0752 120/64 98.1  F (36.7  C) Oral 81 17 96 %   02/08/24 2320 (!) 140/65 98  F (36.7  C) Oral 73 16 97 %   02/08/24 1606 116/64 98  F (36.7  C) Oral 72 14 100 %   02/08/24 1112 113/52 98.2  F (36.8  C) Oral 82 16 99 %   02/08/24 0925 128/65 97.8  F (36.6  C) Oral 80 20 99 % "     Weight: 198 lbs 0 oz      Intake/Output Summary (Last 24 hours) at 2/9/2024 0838  Last data filed at 2/8/2024 2227  Gross per 24 hour   Intake --   Output 1175 ml   Net -1175 ml       General Appearance: NAD  Respiratory: normal effort  Cardiovascular: RRR  GI: soft abdomen, firm non-tender mass left lower anterior abdominal wall and separate firm mass left flank  Skin: ecchymosis left flank, diffuse rash with dry skin and excoriations     Medical Decision Making       52 MINUTES SPENT BY ME on the date of service doing chart review, history, exam, documentation & further activities per the note.      Data     I have personally reviewed the following data over the past 24 hrs:    N/A  \   8.1 (L)   / 150     139 111 (H) 13.3 /  79   3.8 18 (L) 0.99 \     INR:  1.26 (H) PTT:  35   D-dimer:  N/A Fibrinogen:  N/A           Recent Labs   Lab 02/09/24  0453 02/08/24  1838 02/08/24  0531 02/07/24  1424 02/07/24  0956 02/07/24  0600 02/06/24  0959 02/06/24  0618 02/06/24  0009 02/05/24  1826   WBC  --   --   --   --   --  4.6  --  4.5  --  5.3   HGB 8.1* 8.1* 8.2* 7.3*   < > 7.6*   < > 7.9*   < > 8.5*  8.6*   MCV  --   --   --   --   --  87  --  86  --  85     --  128*  --   --  124*  --  144*  --  157   INR 1.26*  --  1.31*  --   --   --   --   --   --   --      --  140 142  --  141  --  140  140  --   --    POTASSIUM 3.8  --  4.1 3.9  --  3.8  --  4.3  4.3   < > 3.4   CHLORIDE 111*  --  115* 114*  --  115*  --  114*  114*  --   --    CO2 18*  --  17* 20*  --  16*  --  17*  17*  --   --    BUN 13.3  --  14.1  --   --  15.4  --  13.2  --   --    CR 0.99  --  1.08  --   --  1.08  --  1.19*  --   --    ANIONGAP 10  --  8 8  --  10  --  9  9  --   --    LATRELL 8.0*  --  7.8*  --   --  7.8*  --  7.5*  --   --    GLC 79  --  74  --   --  82  --  83  --   --    ALBUMIN  --   --   --   --   --  2.6*  --  2.5*  --   --    PROTTOTAL  --   --   --   --   --  5.2*  --  5.1*  --   --    BILITOTAL  --   --   --   --    --  0.8  --  0.5  --   --    ALKPHOS  --   --   --   --   --  209*  --  224*  --   --    ALT  --   --   --   --   --  119*  --  130*  --   --    AST  --   --   --   --   --  112*  --  118*  --   --     < > = values in this interval not displayed.

## 2024-02-09 NOTE — PLAN OF CARE
Goal Outcome Evaluation:      Plan of Care Reviewed With: patient    Overall Patient Progress: improvingOverall Patient Progress: improving    Outcome Evaluation: A&O. VSS on rm air. denies pain. Ambulating SBA. Stone in place for retention. PICC line dressing CDI w/ fluids running TKO.

## 2024-02-09 NOTE — PLAN OF CARE
"Goal Outcome Evaluation:                 Outcome Evaluation: 1500-1930: Hgb: 8.1. PRN Tylenol, Atarax, and Oxycodone were utilized this shift.     /64 (BP Location: Right arm)   Pulse 72   Temp 98  F (36.7  C) (Oral)   Resp 14   Ht 1.727 m (5' 8\")   Wt 89.8 kg (198 lb)   SpO2 100%   BMI 30.11 kg/m          "

## 2024-02-09 NOTE — PLAN OF CARE
Goal Outcome Evaluation:      Plan of Care Reviewed With: patient    Overall Patient Progress: improvingOverall Patient Progress: improving     Vitals stable. A&Ox4. Last HGB was 8.1. Pt reported tenderness in lower abdomen. Good urine output via mcdermott cath. Pt continues with rash throughout body. LR infusing in PICC at 10 ml /hr. Pt requested PRN Ambien and oxycodone at 2230.

## 2024-02-10 NOTE — PROGRESS NOTES
4 hour shift note:    Pt VSS on RA. A&O x4.     Pt and spouse noted to nurse that redness and edema in arms and trunk seemed to be worse in evening, questioning if related to timing of Vanco and wondering if possible that continued redness, itching, and edema possibly indicating reaction to Vanco. Want to discuss with provider possibly d/c'ing vanco at discharge.     Pt c/o pain at HS. Gave 10 mg oxycodone, 650 mg acetaminophen, and 10 mg ambien.

## 2024-02-10 NOTE — PROGRESS NOTES
Went back and updated patient and spouse regarding US. Very appreciative of the cares.     Lynette Luis MD

## 2024-02-10 NOTE — PLAN OF CARE
"Goal Outcome Evaluation:      Plan of Care Reviewed With: patient    Overall Patient Progress: improvingOverall Patient Progress: improving    Outcome Evaluation: AxOx4. Rash to body, worse to upper body. Red and scabs.  PICC in right arm.Heparin gtt started. Vancomycin q 24 hours. Stone in place. Ambulated outside of halls x 3. 2 Oxycodone, 2 hydroxine, 2 tylenol given x 2 for 7/10 pain and itching.  This has worked well from in past. Hematoma to left flank back.    /58 (BP Location: Right arm)   Pulse 67   Temp 97.4  F (36.3  C) (Oral)   Resp 16   Ht 1.727 m (5' 8\")   Wt 89.8 kg (198 lb)   SpO2 100%   BMI 30.11 kg/m      "

## 2024-02-10 NOTE — PROGRESS NOTES
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    Unfortunately patient has also had a recent left upper extremity DVT from a PICC line source, patient has been treated with Lovenox, additionally has underlying coronary artery disease, hypertension, status post prostatectomy due to prostate cancer in remission, hypothyroid who presented with abdominal discomfort and found to have a rectal sheath hematoma with extravasation and anticoagulated patient.    The case was discussed with interventional radiology who did not recommend transfer to higher level care facility for procedural needs that are currently not available here at [facility] [place] [facility] [place]  Recommendation was for conservative management and ongoing monitoring without the need for transfer.      #1 acute blood loss anemia secondary to rectus sheath hematoma  #2 medication induced coagulopathy multifactorial Lovenox and aspirin  #3 thrombocytopenia  #4 anemia of chronic disease    Spontaneous rectus sheath hematoma with extravasation and patient on therapeutic dosing of Lovenox for an upper extremity DVT.  Despite discontinuation of Lovenox on hospitalization as well as discontinuation of intravenous heparin (for management of upper extremity DVT) INR continue to remain prolonged suspicious for underlying coagulopathy.    Patient also taking aspirin chronically which certainly could have contributing to platelet dysfunction which has been on hold during hospitalization.     Patient was then noted to become markedly  thrombocytopenic which has now stabilized and heparin induced thrombocytopenia has not been 100% excluded.  In addition patient has chronic anemia likely in the setting of underlying malignancy and chemotherapy management so at baseline he is anemic.    Patient had evidence of acute blood loss anemia requiring PRBC transfusion 2 units.      Overall clinically stable and improving and is currently on therapeutic anticoagulation with intravenous heparin which was resumed at a lower intensity and without bolus and the tentative plan is to transition him to therapeutic Lovenox prior to discharge.  I will initiate Lovenox today February 10 and continue to monitor for at least another 24 to 48 hours to ensure hemodynamic stability without further    Patient's hemoglobin is currently stable at 8.3.  Platelets are 141 down from previous on 2/9.         #5 history of Enterococcus bacteremia September 2023  #6 liver abscess October 2023   #7 status post ERCP, sphincterotomy and common bile duct stent along with laparoscopic cholecystectomy October 2023  #8 chronic intravenous antibiotics with a PICC line in place        Patient is under treatment for Enterococcus bacteremia and liver abscess.  Patient is currently on intravenous vancomycin supervised by [facility] infectious disease Dr. Ponce.  PICC line is in place.  Patient will follow-up in the outpatient setting with [facility] at the end of February as has been previously arranged.  At this time no changes to intravenous antibiotics.    Spouse is very concerned and she wants to discontinue the use of vancomycin and I recommended that she have this discussion with the infectious disease physician that is managing the intravenous antibiotics.  Patient and spouse understand and they have a visit with infectious disease on Monday via telemedicine.    #9 deep vein thrombosis in the setting of PICC line placement to the left upper extremity    DVT associated with PICC line  in the left upper extremity along with traumatic event from a mechanical fall and active metastatic cancer.  That PICC line was removed and DVT was under treatment with Lovenox 70 mg every 12 hours up until this hospitalization due to the above.      Patient had repeat imaging of the left upper extremity which shows persistent left axillary DVT.  Patient has been on intravenous heparin which was discontinued on February 6 and resumed on February 9 due to acute blood loss anemia and thrombocytopenia.    The total timing and ongoing anticoagulation will be up to patient's primary oncologist.    I do recommend resumption of DVT management with Lovenox in anticipation of discharge in the next 24 to 48 hours at increased dosing due to increased current weight.     Upper extremity is more edematous today than previously.  Patient and spouse understandably concerned repeat Doppler.      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  Concern of infection and progression of cancer burden and patient decided to treat infection and then return to his chemotherapy and immunotherapy treatments.    CT during this hospitalization demonstrated worsening hepatic metastatic involvement.  Additionally has had increased cancer related pain and now on 10 mg of oxycodone every 6 hours as needed.        Patient will follow-up with his [facility] physician as already scheduled for later this month.    Patient has been provided with new acute short-term oxycodone prescription for discharge.      #12 chronic metabolic acidosis with normal anion gap  #13 hyperchloremia    All of this is not new looking back at laboratory studies it is from at least July 2023.  Likely in the setting of chronic disease.  Minimize intravenous fluids containing chloride.  Encourage good oral intake.    #14  chronic urinary retention with an indwelling Stone catheter    This was placed during recent hospitalization back in January due to concerns of urinary retention.  It was recommended to continue the use of indwelling Stone catheter at that time.  Patient has outpatient follow-up with urology to further decide if this needs to be removed versus not.      #15 hypoalbuminemia  #16 malnutrition in the setting of malignancy    Patient has underlying malignancy and multiple chronic comorbidities.  Not unexpected at this time do not recommend intravenous albumin infusion unless there is concerns of significant hypotension.  Patient would benefit from ongoing dietitian support.    #17 previous adverse reaction to Levaquin  #18 skin rash    Patient had adverse reaction to Levaquin with an nonurticarial rash.  Noted in previous hospitalization in January continue to monitor closely.  Antihistamines for any concerns of itching.      #19 hyperlipidemia  #20 hypertension  #21 hypotension    Patient is on Crestor no changes.  Patient actually has lower blood pressures I do recommend holding metoprolol for now and allowing permissive hypertension and chronically ill 73-year-old with multiple comorbidities and underlying infection.    Hypotension is asymptomatic check orthostatics daily.  Hold beta-blocker for now.        #22 underlying coronary artery disease    Patient has coronary artery disease status post LAD stent.  On aspirin, metoprolol and statin.  Aspirin currently on hold due to main concern during this hospitalization.  Continue to hold for now.    Holding beta-blocker due to hypotension no changes to statin.    #23 chronic kidney disease stage III    Avoid renal insults all medications to be renally dosed continue to monitor daily.    #20 for hypothyroidism due to acquired atrophy    Patient is on levothyroxine no changes.    #21 obesity versus overweight    Patient has evidence of peripheral edema this is in the  "setting of hypoalbuminemia.  Weight has increased.    #22 hypoalbuminemia  #23 peripheral edema    Likely in the setting of underlying chronic comorbidities and malignance with evidence of peripheral edema and some hypotension although asymptomatic ordering intravenous albumin.      #24 anxiety    Patient has previously needed the use of a benzodiazepine he was down titrated and discontinued no longer taking anything.  Discussed with them that in their interim in this hospitalization with significant anxiety I do recommend intravenous lorazepam low-dose and they are in agreement.          Diet: Regular Diet Adult    DVT Prophylaxis: Pneumatic Compression Devices  Stone Catheter: PRESENT, indication:    Lines: PRESENT      PICC 01/30/24 Single Lumen Right Basilic-Site Assessment: WDL      Cardiac Monitoring: None  Code Status: Full Code      Clinically Significant Risk Factors              # Hypoalbuminemia: Lowest albumin = 2.5 g/dL at 2/6/2024  6:18 AM, will monitor as appropriate  # Coagulation Defect: INR = 1.26 (Ref range: 0.85 - 1.15) and/or PTT = 35 Seconds (Ref range: 22 - 38 Seconds), will monitor for bleeding    # Hypertension: Noted on problem list        # Obesity: Estimated body mass index is 30.11 kg/m  as calculated from the following:    Height as of this encounter: 1.727 m (5' 8\").    Weight as of this encounter: 89.8 kg (198 lb)., PRESENT ON ADMISSION     # Financial/Environmental Concerns: none         Disposition Plan   Complicated patient which will required multiple assessments and discussion with staff.  Anticipate potential discharge as early as February 11 pending platelets and hemoglobin stability.    Additionally pending findings on imaging that has been ordered for today.             Lynette Luis MD  Hospitalist Service  [facility]  Securely message with [employer] (more info)  Text page via [facility] Paging/Directory "   ______________________________________________________________________    Interval History     Patient and spouse in the room.  Both are extremely concerned and anxious regarding all the workup and planning they are somewhat frustrated that they keep getting different recommendations.    Discussed with patient and spouse that due to initiating Lovenox today at higher dose than previously due to weight gain I do recommend remaining hospitalized to ensure that platelets do not continue to drop as they have since yesterday and that his hemoglobin continues to remain stable.      Additionally the patient has a worsening left upper extremity edema that needs a Doppler to ensure there is no changes.      Patient reports significant anxiety and I have offered to treat this and he is in agreement.  Additionally he is also reporting increasing peripheral edema that is explained to his satisfaction and to his spouses satisfaction due to hypoalbuminemia.  I did discuss with him that I will give him an intravenous dose of albumin and hopefully this will be helpful to him.      Physical Exam   Vital Signs: Temp: 97.5  F (36.4  C) Temp src: Oral BP: 103/48 Pulse: 71   Resp: 16 SpO2: 98 % O2 Device: None (Room air)    Weight: 198 lbs 0 oz    General physical exam    Alert and oriented x 3 very pleasant on room air    Lungs are clear to auscultation bilaterally    Cardiovascular exam regular rate and rhythm no murmurs noted    Left upper extremity with edema noted     Peripheral edema to the digits and lower extremities noted +1    Neurological exam alert and oriented x 3 nonfocal    Skin patient has evidence of skin rash dry skin      Psychiatric mood and affect appropriate          Medical Decision Making       Over 45  MINUTES SPENT BY ME on the date of service doing chart review, history, exam, documentation & further activities per the note.  NOTE(S)/MEDICAL RECORDS REVIEWED over the past 24 hours: I have reviewed  extensively the notes laboratory studies and imaging during hospitalization.  Tests ORDERED & REVIEWED in the past 24 hours:  - See lab/imaging results included in the data section of the note  - CBC  Medical complexity over the past 24 hours:  - Prescription DRUG MANAGEMENT performed  - Treatment limited by SOCIAL DETERMINANTS OF HEALTH      Data     I have personally reviewed the following data over the past 24 hrs:    N/A  \   8.3 (L)   / 141 (L)     139 111 (H) 13.0 /  72   3.6 19 (L) 1.01 \       Imaging results reviewed over the past 24 hrs:   No results found for this or any previous visit (from the past 24 hour(s)).

## 2024-02-10 NOTE — PLAN OF CARE
"Goal Outcome Evaluation:      Plan of Care Reviewed With: patient, spouse    Overall Patient Progress: improvingOverall Patient Progress: improving    Outcome Evaluation: AxOx4.  PICC to right arm.  Heparing gtt stopped. Lovenox started. Stoen in place with good output. Ativan x 1 with relief. 2 Oxycodone, 2 tylenol, and hydroxyzine x 2. Ambulated halls. BP's soft.  Metoprolol held and d/c'd.  Ultrasound to right arm for increased swelling. Hematoma to left flank.. Dry, red, flaky skin with scabbing throughout body. Worse to upper extremities. Offered lotion.    BP 95/56 (BP Location: Right arm)   Pulse 70   Temp (!) 96.3  F (35.7  C) (Axillary)   Resp 16   Ht 1.727 m (5' 8\")   Wt 87.2 kg (192 lb 4.8 oz)   SpO2 99%   BMI 29.24 kg/m      "

## 2024-02-10 NOTE — PLAN OF CARE
Goal Outcome Evaluation:      Plan of Care Reviewed With: patient    Overall Patient Progress: improvingOverall Patient Progress: improving    Patient AO x4, pleasant and conversant. Reports increased redness and itchiness. Noted swelling and scabs on forearms. Stone intact with good urine output. Continued on heparin drip, rate unchanged. PICC dressing reinforced. PRN meds Tylenol, Oxycodone and Atarax given.

## 2024-02-11 NOTE — PROGRESS NOTES
4 Hour Shift Note:    Patient sleeping most of shift. Stone leg bag in place. PRN Oxycodone, Tylenol and Atarax given for pain to back and abdomen. PICC infusing LR TKO.

## 2024-02-11 NOTE — DISCHARGE INSTRUCTIONS
If any pain fevers chills increasing left upper extremity swelling return to the emergency department for acute assessment.

## 2024-02-11 NOTE — PROGRESS NOTES
Care Management Discharge Note    Discharge Date: 02/11/2024     Discharge Disposition: Home Infusion    Discharge Services: None    Discharge DME: None    Discharge Transportation: family or friend will provide    Private pay costs discussed: Not applicable    Does the patient's insurance plan have a 3 day qualifying hospital stay waiver?  No    Patient/family educated on Medicare website which has current facility and service quality ratings: yes    Education Provided on the Discharge Plan: No  Persons Notified of Discharge Plans: Patient/wife  Patient/Family in Agreement with the Plan: yes    Handoff Referral Completed: Yes    Additional Information:  Per IDT rounds, patient is medically stable for discharge today.     Patient will discharge to home with wife Alessandra. Alessandra, is a RN, and capable of assisting patient with cares.     CM called [facility] [phone] and advised that patient will be discharging home today after dose of IV abx. CM faxed orders per request to [phone].     Patient's wife will transport to home.       LYN Bhat  Inpatient Care Coordinator   [facility] [phone]  [facility] [phone]

## 2024-02-11 NOTE — PLAN OF CARE
S-(situation): Patient discharged to home via ambulatory to the door with spouse    B-(background): Rectus sheath hematoma    A-(assessment): Pt is A & O x 4, VSS, afebrile, on room air. Lung sounds CTA. Pt remains itchy, atarax given x 1. Oxycodone and tylenol given for pain. IV vanco given early for discharge. Wife at bedside.    R-(recommendations): Discharge instructions reviewed with patient and spouse. Listed belongings gathered and returned to patient.          Discharge Nursing Criteria:     Care Plan and Patient education resolved: Yes    New Medications- pt has been educated about purpose and side effects: Yes    Vaccines  Influenza status verified at discharge:  Yes    Intentionally Retained Items (examples: Drains, Wound packing, ureteral stents, mcdermott, PICC line or IV)  Patient was sent home with PICC left in place.   Follow up appointment made for removal:  To be used for outpatient ABX    MISC  Prescriptions if needed, hard copies sent with patient  Yes  Home medications returned to patient: NA  Medication Bin checked and emptied on discharge Yes  Patient reports post-discharge pain management plan is effective: Yes    TKA/OMID ONLY:   Discharged with [facility] Stockings No  [facility] stocking Education Completed No

## 2024-02-11 NOTE — PLAN OF CARE
Goal Outcome Evaluation:      Plan of Care Reviewed With: patient    Overall Patient Progress: improving    Outcome Evaluation: Pt A&O x4. VSS on RA. LR running at KVO through PICC. Pt had 0.5 mg PO Lorazepam at 2320 and at 0550.     7/10 pain at 0300, received 10 mg oxycodone and 650 mg ace.     Pt mcdermott output adequate during shift. Self-completed cath cares. No change in redness/itching.

## 2024-02-11 NOTE — DISCHARGE SUMMARY
"[facility]  Hospitalist Discharge Summary      Date of Admission:  2/5/2024  Date of Discharge:  2/11/2024  Discharging Provider: Lynette Luis MD  Discharge Service: Hospitalist Service    Discharge Diagnoses   #1 acute blood loss anemia requiring PRBC transfusion secondary to rectus sheath hematoma  #2  Spontaneous rectus sheath hematoma  #3 medication induced coagulopathy, thrombocytopenia  #4 anemia of chronic disease  #5 underlying Enterococcus bacteremia on intravenous vancomycin  #6 history of left upper extremity DVT on chronic anticoagulation with Lovenox  #7 cancer related chronic pain requiring increase outpatient prescription  #8 cancer induced malnutrition with hypoalbuminemia  #9 bradycardia  #10 hypotension  #11 peripheral edema        Clinically Significant Risk Factors     # Overweight: Estimated body mass index is 29.24 kg/m  as calculated from the following:    Height as of this encounter: 1.727 m (5' 8\").    Weight as of this encounter: 87.2 kg (192 lb 4.8 oz).       Follow-ups Needed After Discharge     Patient will follow-up with primary care as soon as able spouse is going to be in touch with primary care  Laboratory studies will be ordered in the outpatient setting for ongoing monitoring of creatinine and platelets patient is due to have a vancomycin trough on Tuesday at which time a BMP and a CBC will be ordered      Unresulted Labs Ordered in the Past 30 Days of this Admission       No orders found from 1/6/2024 to 2/6/2024.            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  Case was discussed prior to hospitalization with interventional radiology who did not recommend transfer to higher level care facility for procedural needs and patient could be safely hospitalized at [facility]note 3] for conservative management.        #1 acute blood loss anemia secondary to rectus sheath hematoma  #2 spontaneous rectus sheath hematoma in anticoagulated patient  #3 medication induced coagulopathy (multifactorial Lovenox and aspirin) with thrombocytopenia and INR coagulopathy  #4 anemia of chronic disease    Spontaneous rectus sheath hematoma with extravasation in patient on therapeutic dosing of Lovenox for an upper extremity DVT.  Despite discontinuation of Lovenox on admission along with discontinuation of any intravenous heparin for ongoing management of upper extremity DVT patient continued to have elevated INR and thrombocytopenia.  Patient was also previously on aspirin which was discontinued and is currently on hold on discharge which also could have played a role in his platelet dysfunction.    Patient required PRBC transfusion for acute blood loss anemia in patient with underlying anemia of chronic disease.  Patient required 2 units of PRBCs.    Clinically improved and stable was started on subcu Lovenox 1 mg/kg every 12 hours on February 10 with ongoing stability and was able to discharge home on February 11.  Due to increase in weight dosing adjusted for discharge.      #5 history of Enterococcus bacteremia September 2023  #6 liver abscess October 2023   #7 status post ERCP, sphincterotomy and common bile duct stent along with laparoscopic cholecystectomy October 2023  #8 chronic intravenous antibiotics with a  PICC line in place    Patient is under treatment for Enterococcus bacteremia and liver abscess.  Patient is currently on intravenous vancomycin supervised by [facility] infectious disease Dr. Ponce and during hospitalization managed by clinical pharmacist.  PICC line is in place.  Patient will follow-up in the outpatient setting with [facility] at the end of February as has been previously arranged.  At this time no changes to intravenous antibiotics although may need adjustment based on creatinine function spouse is aware and will discuss this with [facility] infectious disease.    Spouse has been very concerned and she wanted us to discontinue the use of intravenous vancomycin I did discuss with her that this conversation should happen with the infectious disease doctor at the [facility].  Patient will be seen via telemedicine on Monday at which time spouse whom is a prior nurse will discuss vancomycin with Dr. Ponce.    #9 deep vein thrombosis in the setting of PICC line placement to the left upper extremity    DVT associated with PICC line in the left upper extremity along with traumatic event from a mechanical fall and active metastatic cancer.  That PICC line was removed and DVT was under treatment with Lovenox 70 mg every 12 hours up until this hospitalization due to the above.   Patient had imaging of the left upper extremity which shows persistent left axillary DVT and again had repeat imaging on February 10 due to concerns of increasing edema and the ultrasound showed stability and patient and spouse reassured.    Patient on arrival due to hematoma all anticoagulation was initially on hold and he was started on intravenous heparin which had to be discontinued February 6 due to ongoing concerns with thrombocytopenia and acute blood loss anemia ultimately resumed back on February 9 with stability of laboratory parameters.  Patient was then started on subcu Lovenox at higher dosing February 10 without  any concerns.  He will be discharging on 90 mcg every 12.  New prescription provided.    Unclear if this will be indefinite anticipate potentially so in the setting of underlying malignancy and upper extremity DVT but will allow primary oncologist to decide if any changes or any other anticoagulation recommendations.      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  Concern of infection and progression of cancer burden and patient decided to treat infection and then return to his chemotherapy and immunotherapy treatments once past this acute infectious process.    CT during this hospitalization demonstrated worsening hepatic metastatic involvement.  Additionally has had increased cancer related pain and now on 10 mg of oxycodone every 6 hours as needed and new prescription has been provided with increased dosing.    Patient will follow-up with his [facility] physician as already scheduled for later this month.      #12 chronic metabolic acidosis with normal anion gap  #13 hyperchloremia    All of this is not new looking back at laboratory studies it is from at least July 2023.  Likely in the setting of chronic disease.  Minimize intravenous fluids containing chloride.  Encourage good oral intake.    #14 chronic urinary retention with an indwelling Stone catheter    This was placed during recent hospitalization back in January due to concerns of urinary retention.  It was recommended to continue the use of indwelling Stone catheter at that time.  Patient has outpatient follow-up with urology to further decide if this needs to be removed versus not.      #15 hypoalbuminemia  #16 malnutrition in the setting of malignancy  #17 peripheral edema    Patient has underlying malignancy and multiple chronic comorbidities.  Not unexpected at this time do not recommend  intravenous albumin infusion unless there is concerns of significant hypotension.  Patient was provided with intravenous dose of albumin with much improvement of his peripheral edema.    #18 previous adverse reaction to Levaquin  #19 skin rash    Patient had adverse reaction to Levaquin with an nonurticarial rash.  Noted in previous hospitalization in January continue to monitor closely.  Antihistamines for any concerns of itching.      #20 hyperlipidemia  Patient is on Crestor no changes.      #21 hypertension  #22 hypotension and bradycardia medication induced resolved      Patient actually noted to have hypotension asymptomatic beta-blocker had to be held then was noted to be bradycardic improved with discontinuation of beta-blocker.      On discharge metoprolol on hold discussed with spouse she is a nurse and she is 100% in agreement and she will be checking blood pressure and heart rate and call primary care if any concerns.        #23 underlying coronary artery disease    Patient has coronary artery disease status post LAD stent.  On aspirin, metoprolol and statin prior to arrival.  Aspirin currently on hold due to main concern during this hospitalization and medication changes including holding aspirin and beta-blocker as noted above.      #24 chronic kidney disease stage III    Stable creatinine all medications were renally dosed during hospitalization    #25 hypothyroidism due to acquired atrophy    Patient is on levothyroxine no changes.    #26 anxiety resolved    Needed a few doses of benzodiazepine due to being frustrated regarding hospitalization and somewhat anxious.  No need for benzodiazepine on discharge.    Consultations This Hospital Stay   PHARMACY IP CONSULT  CARE MANAGEMENT / SOCIAL WORK IP CONSULT  PHARMACY IP CONSULT    Code Status   Full Code    Time Spent on this Encounter   Lynette JAY MD, personally saw the patient today and spent greater than 30 minutes discharging this  patient.       Lynette Luis MD  [facility] MEDICAL SURGICAL  [address]   [place] [address]  Phone: [phone]  ______________________________________________________________________    Physical Exam   Vital Signs: Temp: 97.7  F (36.5  C) Temp src: Oral BP: 125/63 Pulse: 70   Resp: 18 SpO2: 99 % O2 Device: None (Room air)    Weight: 192 lbs 4.8 oz    General appearance alert and oriented x 3 seen ambulating in the room  Left upper extremity much improved less edematous than on February 10  Lungs are clear to auscultation bilaterally  Cardiovascular exam regular rate and rhythm no rubs murmurs or gallops  Lower extremity without pitting edema  Neurological exam alert and oriented x 3 no lateralizing symptoms  Psychiatric exam mood and affect appropriate       Primary Care Physician   Monico Rivers    Discharge Orders   No discharge procedures on file.    Significant Results and Procedures   Results for orders placed or performed during the hospital encounter of 02/05/24   CT Chest/Abdomen/Pelvis w Contrast    Narrative    CT CHEST/ABDOMEN/PELVIS W CONTRAST 2/5/2024 9:16 AM    CLINICAL HISTORY: Anasarca, left lower quadrant pain with mass,  potentially hematoma versus hernia, left arm swelling increasing with  known DVT    TECHNIQUE: CT scan of the chest, abdomen, and pelvis was performed  following injection of IV contrast. Multiplanar reformats were  obtained. Dose reduction techniques were used.   CONTRAST: ISOVUE-370, 100mL    COMPARISON: 1/25/2024, 1/19/2024, 12/26/2023 and 10/29/2023    FINDINGS:   LUNGS AND PLEURA: New trace right and small left pleural effusion and  bibasilar atelectasis. No new or enlarging pulmonary masses.    MEDIASTINUM/AXILLAE: Filling defect in the left subclavian and  axillary veins, but assessed on the concomitant venous ultrasound of  the left upper extremity. The right arm PICC tip at the SVC/right  atrial junction. No filling defects in  the central pulmonary arteries.  No lymphadenopathy in the mediastinum, hilar regions or axillary  regions. Coronary stents.    HEPATOBILIARY: Decreased size of the ablation zone and segment 8 and  decreased size of adjacent soft tissue extending outside the liver.  The ablation cavity measures 2.3 x 1.6 cm (3/143) previously measuring  3.1 x 2.3 cm and the soft tissue abutting the liver capsule measures  3.7 x 2.2 cm (3/136), previously 3.9 x 2.0 cm.     Slight gradual increase of number and size of hepatic metastases. For  example, a 2.4 cm lesion in segment 8 previously measured 1.7 cm  (3/132), a 1.9 cm lesion in segment 6/7 (3/146) previously measured  1.3 cm, a 1.0 cm lesion in segment 2 is new (3/145) and a 1.0 cm  lesion in the medial segment 6 is new (3/169).    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 Patent main portal vein.    Cholecystectomy. No biliary ductal dilatation.    PANCREAS: Stable heterogeneous hypodense lesion in the pancreatic neck  measuring 1.5 cm (3/173). No pancreatic ductal dilatation,  peripancreatic inflammatory changes or fluid collections.    SPLEEN: Normal.    ADRENAL GLANDS: Normal.    KIDNEYS/BLADDER: Few small nonobstructing bilateral renal calculi.  Subcentimeter left renal cyst requiring no specific follow-up. Stone  catheter decompresses the urinary bladder.    BOWEL: Diverticulosis in the colon. No acute inflammatory change. No  obstruction.     PELVIC ORGANS: Prostatectomy. No pelvic masses.    ADDITIONAL FINDINGS: Moderate sized left rectus sheath hematoma  measuring 7.1 x 5.5 x 11.9 cm. A small hyperdense linear foci within  the hematoma concerning for active extravasation (series 3, image  [id number]).    No lymphadenopathy in the abdomen and pelvis. Right external iliac  artery stent. No abdominal aortic aneurysm. Small amount of free fluid  in the pelvis. No fluid collections or free air.    MUSCULOSKELETAL: Since the  chest CT on 10/29/2023, new sclerotic  metastasis in the posterior right seventh rib. Increased size of  sclerotic metastasis in T4. Degenerative changes of the spine. Few  healing left-sided rib fractures.      Impression    IMPRESSION:  1.  Moderate size left rectus sheath hematoma with concern for active  extravasation into the hematoma.  2.  Left subclavian and axillary vein thrombosis, better assessed by  the concomitant venous ultrasound of the left upper extremity.  3.  Trace right and small left pleural effusion and bibasilar  atelectasis are new.  4.  Interval progression of hepatic metastases with increased size and  number of hepatic metastases. Decreased size of the ablation zone in  the right lobe of the dome.  5.  Slight worsening of right seventh rib and T4 osseous metastases.  6.  Stable 1.5 cm heterogeneous area in the pancreatic neck. Attention  on follow-up.    ANDREY CAMACHO MD         SYSTEM ID:  [id number]   US Upper Extremity Venous Duplex Left    Narrative    US UPPER EXTREMITY VENOUS DUPLEX LEFT  2/6/2024 1:01 PM     HISTORY: left arm swelling, history of DVT in the left upper extremity    COMPARISON: 1/31/2024    FINDINGS: Color Doppler and spectral waveform analysis performed.    Again identified is thrombus in the left internal jugular vein  extending into the subclavian vein, axillary vein, and brachial veins.  This is not significantly changed.    Previously seen thrombus in the left basilic vein has improved. There  is persistent thrombus in the proximal basilic vein. Previously seen  thrombus in the mid and distal basilic vein has resolved.      Impression    IMPRESSION: No significant change in DVT in the left upper extremity.  Improvement in superficial venous thrombus in the left basilic vein.      MALLORY MCLEAN MD         SYSTEM ID:  [id number]   US Lower Extremity Venous Duplex Bilateral    Narrative    VENOUS ULTRASOUND BOTH LEGS  2/6/2024 1:02 PM     HISTORY: (B)LE  edema    COMPARISON: None.    FINDINGS:  Examination of the deep veins with graded compression and  color flow Doppler with spectral wave form analysis was performed.   There is no evidence for DVT in the lower extremities.      Impression    IMPRESSION: No evidence of deep venous thrombosis.    MALLORY MCLEAN MD         SYSTEM ID:  [id number]   US Upper Extremity Venous Duplex Left    Narrative    EXAM: US UPPER EXTREMITY VENOUS DUPLEX LEFT  LOCATION: [facility]  DATE: 2/10/2024    INDICATION: increased edema h o DVT  COMPARISON: Left upper extremity venous Doppler ultrasound 02/06/2024.  TECHNIQUE: Venous Duplex ultrasound of the left upper extremity with (when possible) and without compression, augmentation, and duplex. Color flow and spectral Doppler with waveform analysis performed.    FINDINGS: Ultrasound includes evaluation of the internal jugular vein, innominate vein, subclavian vein, axillary vein, and brachial vein. The superficial cephalic and basilic veins and deep radial and ulnar veins were also evaluated where seen.     LEFT: Redemonstrated occlusive mixed echogenicity filling defect within the left internal jugular vein, subclavian vein, and axillary vein. Improvement in previously seen DVT within the brachial vein. Redemonstrated partially occlusive superficial   thrombus within the basilic vein in the proximal upper arm. Mild subcutaneous edema within the upper arm.      Impression    IMPRESSION:   1.  Redemonstrated occlusive DVT within the left internal jugular, subclavian, and axillary veins. Improvement in DVT within the brachial vein.  2.  Redemonstrated superficial thrombus within the basilic vein.     *Note: Due to a large number of results and/or encounters for the requested time period, some results have not been displayed. A complete set of results can be found in Results Review.       Discharge Medications   Current Discharge Medication List         CONTINUE these medications which have CHANGED    Details   enoxaparin ANTICOAGULANT (LOVENOX) 100 MG/ML syringe Inject 0.9 mLs (90 mg) Subcutaneous every 12 hours  Qty: 100 mL, Refills: 0    Comments: 90 mg q12 hours daily.  Associated Diagnoses: Deep vein thrombosis (DVT) of left upper extremity, unspecified chronicity, unspecified vein (H)      oxyCODONE (ROXICODONE) 5 MG tablet Take 2 tablets (10 mg) by mouth every 6 hours as needed for severe pain MAX OF 8 TABLETS PER DAY  Qty: 24 tablet, Refills: 0    Associated Diagnoses: 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); Osteoarthritis of right foot, unspecified osteoarthritis type; Cancer associated pain           CONTINUE these medications which have NOT CHANGED    Details   acetaminophen (TYLENOL) 325 MG tablet Take 1-2 tablets (325-650 mg) by mouth every 6 hours as needed for mild pain  Qty: 40 tablet, Refills: 0    Associated Diagnoses: Gallstone pancreatitis      cetirizine (ZYRTEC) 10 MG tablet Take 1 tablet (10 mg) by mouth daily  Qty: 30 tablet, Refills: 1    Associated Diagnoses: Chronic maxillary sinusitis      famotidine (PEPCID) 20 MG tablet Take 20 mg by mouth 2 times daily as needed (heartburn)      hydrOXYzine HCl (ATARAX) 25 MG tablet Take 1-2 tablets (25-50 mg) by mouth 3 times daily as needed for itching  Qty: 30 tablet, Refills: 0    Associated Diagnoses: Itching      levothyroxine (SYNTHROID/LEVOTHROID) 88 MCG tablet Take 1 tablet (88 mcg) by mouth daily  Qty: 90 tablet, Refills: 1    Associated Diagnoses: Hypothyroidism due to acquired atrophy of thyroid      melatonin 5 MG tablet Take 5 mg by mouth nightly as needed for sleep      pregabalin (LYRICA) 150 MG capsule TAKE 1 CAPSULE (150 MG) BY MOUTH 2 TIMES DAILY FOR 30 DAYS  Qty: 60 capsule, Refills: 0    Associated Diagnoses: Other chronic postprocedural pain      rosuvastatin (CRESTOR) 40 MG tablet Take 1 tablet (40 mg) by mouth daily  Qty: 90 tablet, Refills: 3    Associated Diagnoses:  Hyperlipidemia LDL goal <130      tamsulosin (FLOMAX) 0.4 MG capsule Take 1 capsule (0.4 mg) by mouth daily  Qty: 30 capsule, Refills: 0    Comments: Future refills by PCP Dr. Monico Rivers with phone number [phone].  Associated Diagnoses: Urinary retention      vancomycin (VANCOCIN) 1250 mg/250 mL IVPB Inject 1,250 mg into the vein every 24 hours for 21 days    Comments: Check weekly CBC/diff,  CRP. Check Creatinine and Vancomycin twice weekly, pharmacy to dose based on AUC  Send results to [facility] ID  Mckayla Ponce  Associated Diagnoses: Liver abscess      zolpidem (AMBIEN) 10 MG tablet TAKE ONE TABLET BY MOUTH EVERY NIGHT AT BEDTIME AS NEEDED FOR SLEEP  Qty: 30 tablet, Refills: 0    Associated Diagnoses: 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, insomnia type      naloxone (NARCAN) 4 MG/0.1ML nasal spray Spray 1 spray (4 mg) into one nostril alternating nostrils as needed for opioid reversal every 2-3 minutes until assistance arrives  Qty: 0.2 mL, Refills: 0    Associated Diagnoses: Osteoarthritis of right foot, unspecified osteoarthritis type; 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 If symptoms persist 5 minutes after 1st dose call 911.  Qty: 25 tablet, Refills: 0    Associated Diagnoses: Atherosclerosis of native coronary artery of native heart with unstable angina pectoris (H)      ondansetron (ZOFRAN ODT) 4 MG ODT tab DISSOLVE ONE TABLET BY MOUTH EVERY 8 HOURS AS NEEDED FOR NAUSEA  Qty: 10 tablet, Refills: 0    Associated Diagnoses: 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); Osteoarthritis of right foot, unspecified osteoarthritis type           STOP taking these medications       aspirin 81 MG EC tablet Comments:   Reason for Stopping:         metoprolol succinate ER (TOPROL XL) 50 MG 24 hr tablet Comments:   Reason for Stopping:         ondansetron  (ZOFRAN) 4 MG tablet Comments:   Reason for Stopping:         predniSONE (DELTASONE) 10 MG tablet Comments:   Reason for Stopping:             Allergies   Allergies   Allergen Reactions    Levofloxacin Rash     Head to toe    Animal Dander     Augmentin [Amoxicillin-Pot Clavulanate] GI Disturbance    Azithromycin Nausea and Vomiting    Dust Mites     Pollen Extract     Smoke.

## 2024-02-12 NOTE — PROGRESS NOTES
Clinic Care Coordination Contact  [facility]: Post-Discharge Note  SITUATION                                                      Admission:    Admission Date: 02/05/24   Reason for Admission: acute blood loss anemia requiring PRBC transfusion secondary to rectus sheath hematoma  Discharge:   Discharge Date: 02/11/24  Discharge Diagnosis: acute blood loss anemia requiring PRBC transfusion secondary to rectus sheath hematoma    BACKGROUND                                                      Per hospital discharge summary and inpatient provider notes:    Hospital Course  73-year-old gentleman with a complicated past medical history including 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  Case was discussed prior to hospitalization with interventional radiology who did not recommend transfer to higher level care facility for procedural needs and patient could be safely hospitalized at [facility] for conservative management.          #1 acute blood loss anemia secondary to rectus sheath hematoma  #2 spontaneous rectus sheath hematoma in anticoagulated patient  #3 medication induced coagulopathy (multifactorial Lovenox and aspirin) with thrombocytopenia and INR coagulopathy  #4 anemia of chronic disease     Spontaneous rectus sheath hematoma with extravasation in patient on therapeutic dosing of Lovenox for an upper extremity DVT.  Despite discontinuation of Lovenox on admission along with discontinuation of any intravenous heparin  for ongoing management of upper extremity DVT patient continued to have elevated INR and thrombocytopenia.  Patient was also previously on aspirin which was discontinued and is currently on hold on discharge which also could have played a role in his platelet dysfunction.     Patient required PRBC transfusion for acute blood loss anemia in patient with underlying anemia of chronic disease.  Patient required 2 units of PRBCs.     Clinically improved and stable was started on subcu Lovenox 1 mg/kg every 12 hours on February 10 with ongoing stability and was able to discharge home on February 11.  Due to increase in weight dosing adjusted for discharge.        #5 history of Enterococcus bacteremia September 2023  #6 liver abscess October 2023   #7 status post ERCP, sphincterotomy and common bile duct stent along with laparoscopic cholecystectomy October 2023  #8 chronic intravenous antibiotics with a PICC line in place     Patient is under treatment for Enterococcus bacteremia and liver abscess.  Patient is currently on intravenous vancomycin supervised by [facility] infectious disease Dr. Ponce and during hospitalization managed by clinical pharmacist.  PICC line is in place.  Patient will follow-up in the outpatient setting with [facility] at the end of February as has been previously arranged.  At this time no changes to intravenous antibiotics although may need adjustment based on creatinine function spouse is aware and will discuss this with [facility] infectious disease.     Spouse has been very concerned and she wanted us to discontinue the use of intravenous vancomycin I did discuss with her that this conversation should happen with the infectious disease doctor at the [facility].  Patient will be seen via telemedicine on Monday at which time spouse whom is a prior nurse will discuss vancomycin with Dr. Ponce.     #9 deep vein thrombosis in the setting of PICC line placement to the left upper extremity      DVT associated with PICC line in the left upper extremity along with traumatic event from a mechanical fall and active metastatic cancer.  That PICC line was removed and DVT was under treatment with Lovenox 70 mg every 12 hours up until this hospitalization due to the above.   Patient had imaging of the left upper extremity which shows persistent left axillary DVT and again had repeat imaging on February 10 due to concerns of increasing edema and the ultrasound showed stability and patient and spouse reassured.     Patient on arrival due to hematoma all anticoagulation was initially on hold and he was started on intravenous heparin which had to be discontinued February 6 due to ongoing concerns with thrombocytopenia and acute blood loss anemia ultimately resumed back on February 9 with stability of laboratory parameters.  Patient was then started on subcu Lovenox at higher dosing February 10 without any concerns.  He will be discharging on 90 mcg every 12.  New prescription provided.     Unclear if this will be indefinite anticipate potentially so in the setting of underlying malignancy and upper extremity DVT but will allow primary oncologist to decide if any changes or any other anticoagulation recommendations.        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  Concern of infection and progression of cancer burden and patient decided to treat infection and then return to his chemotherapy and immunotherapy treatments once past this acute infectious process.     CT during this hospitalization demonstrated worsening hepatic metastatic involvement.  Additionally has had increased cancer related pain and now on 10 mg of oxycodone every 6 hours as needed and new prescription has been provided with increased dosing.     Patient will follow-up with his [facility] physician as already scheduled for later this month.        #12 chronic metabolic acidosis with normal anion gap  #13 hyperchloremia     All of this is not new looking back at laboratory studies it is from at least July 2023.  Likely in the setting of chronic disease.  Minimize intravenous fluids containing chloride.  Encourage good oral intake.     #14 chronic urinary retention with an indwelling Stone catheter     This was placed during recent hospitalization back in January due to concerns of urinary retention.  It was recommended to continue the use of indwelling Stone catheter at that time.  Patient has outpatient follow-up with urology to further decide if this needs to be removed versus not.        #15 hypoalbuminemia  #16 malnutrition in the setting of malignancy  #17 peripheral edema     Patient has underlying malignancy and multiple chronic comorbidities.  Not unexpected at this time do not recommend intravenous albumin infusion unless there is concerns of significant hypotension.  Patient was provided with intravenous dose of albumin with much improvement of his peripheral edema.     #18 previous adverse reaction to Levaquin  #19 skin rash     Patient had adverse reaction to Levaquin with an nonurticarial rash.  Noted in previous hospitalization in January continue to monitor closely.  Antihistamines for any concerns of itching.        #20 hyperlipidemia  Patient is on Crestor no changes.        #21 hypertension  #22 hypotension and bradycardia medication induced resolved        Patient actually noted to have hypotension asymptomatic beta-blocker had to be held then was noted to be bradycardic improved with discontinuation of beta-blocker.       On discharge metoprolol on hold discussed with spouse she is a nurse and she is 100% in agreement and she will be checking blood pressure and heart rate and call primary care if any concerns.           #23 underlying coronary artery disease     Patient has  coronary artery disease status post LAD stent.  On aspirin, metoprolol and statin prior to arrival.  Aspirin currently on hold due to main concern during this hospitalization and medication changes including holding aspirin and beta-blocker as noted above.        #24 chronic kidney disease stage III     Stable creatinine all medications were renally dosed during hospitalization     #25 hypothyroidism due to acquired atrophy     Patient is on levothyroxine no changes.     #26 anxiety resolved     Needed a few doses of benzodiazepine due to being frustrated regarding hospitalization and somewhat anxious.  No need for benzodiazepine on discharge.    ASSESSMENT           Discharge Assessment  How are you doing now that you are home?: per patient's wife, things are OK. she said she has a message out to [place] to see when he needs his next vanco trough. wife said otherwise things are going OK  How are your symptoms? (Red Flag symptoms escalate to triage hotline per guidelines): Improved  Do you feel your condition is stable enough to be safe at home until your provider visit?: Yes  Does the patient have their discharge instructions? : Yes  Does the patient have questions regarding their discharge instructions? : No  Were you started on any new medications or were there changes to any of your previous medications? : Yes  Does the patient have all of their medications?: Yes  Do you have questions regarding any of your medications? : No  Do you have all of your needed medical supplies or equipment (DME)?  (i.e. oxygen tank, CPAP, cane, etc.): Yes  Discharge follow-up appointment scheduled within 14 calendar days? : Yes  Discharge Follow Up Appointment Date: 02/21/24  Discharge Follow Up Appointment Scheduled with?: Primary Care Provider         Post-op (Clinicians Only)  Did the patient have surgery or a procedure: No  Fever: No  Chills: No  Eating & Drinking: eating and drinking without complaints/concerns  PO Intake: low fat  diet      PLAN                                                      Outpatient Plan:      Follow-ups Needed After Discharge  Patient will follow-up with primary care as soon as able spouse is going to be in touch with primary care  Laboratory studies will be ordered in the outpatient setting for ongoing monitoring of creatinine and platelets patient is due to have a vancomycin trough on Tuesday at which time a BMP and a CBC will be ordered    Future Appointments   Date Time Provider Department Center   2/13/2024  3:00 PM PH INFUSION NURSE [facility]   2/14/2024  8:45 AM David Rogers MD [facility]   2/16/2024 10:30 AM PH INFUSION CHAIR 9 [facility]   2/20/2024  3:00 PM PH INFUSION NURSE [facility]   2/21/2024 10:00 AM Monico Rivers MD [facility]   2/23/2024  8:30 AM PH INFUSION NURSE [facility]   2/23/2024 10:00 AM Monico Rivers MD [facility]   2/27/2024  3:00 PM PH INFUSION NURSE [facility]   3/5/2024  3:00 PM PH INFUSION CHAIR 8 [facility]         For any urgent concerns, please contact our 24 hour nurse triage line: [phone] ([phone])       Patient already enrolled in care coordination. Wife said at this time, she can't think of any needs from RN CC. She is waiting to hear from the [facility] on patient's labs. Patient said they might need a hemoglobin order. Wife will call RN CC if she needs anything.     Rossana Moore RN

## 2024-02-13 NOTE — TELEPHONE ENCOUNTER
MTM referral from: Transitions of Care (recent hospital discharge or ED visit)    MTM referral outreach attempt #1 on February 13, 2024 at 10:22 AM      Outcome: Spoke with patient's wife, she declined services    Use vbc (girish) for the carrier/Plan on the flowsheet        Roma Oneal CPhT  MTM

## 2024-02-13 NOTE — PROGRESS NOTES
Clinic Care Coordination Contact  Care Team Conversations    RN CC received a call from patient's wife. She said the patient needs to see Dr. Rivers today. She said he needs a hospital follow up and has a lot of swelling since being discharged.     RN CC called and spoke to the wife. She said they were able to get an appointment with Dr. Rivers today at 2 pm. She said at this time, she does not need anything more.     Rossana Moore RN Care Coordination   [facility] [place]  Email: [email or web address]  Phone: [phone]

## 2024-02-13 NOTE — PROGRESS NOTES
Infusion Nursing Note:  Quan Murphy presents today for PICC labs/Drsg change.    Patient seen by provider today: Yes: Dr. Rivers.   present during visit today: Not Applicable.    Note: N/A.      Intravenous Access:  PICC.  Labs drawn without difficulty.   Drsg change done.   See IV flowsheet.      Discharge Plan:   AVS to patient via MYCHART.  Patient will return 2/20 for next appointment.   Patient discharged in stable condition accompanied by: wife.  Departure Mode: Ambulatory.      Manisha Chase RN

## 2024-02-13 NOTE — PROGRESS NOTES
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)    Relevant Medications    oxyCODONE (ROXICODONE) 5 MG tablet       Digestive    Metastatic cancer to liver (H) - Primary    Liver abscess    Relevant Orders    Vancomycin level    CBC with platelets and differential    Comprehensive metabolic panel (BMP + Alb, Alk Phos, ALT, AST, Total. Bili, TP)       Endocrine    Hypomagnesemia       Circulatory    Coronary atherosclerosis    Acute deep vein thrombosis (DVT) of axillary vein of left upper extremity (H)       Musculoskeletal and Integumentary    Degenerative arthritis of foot    Relevant Medications    oxyCODONE (ROXICODONE) 5 MG tablet    Rectus sheath hematoma, initial encounter    Relevant Medications    oxyCODONE (ROXICODONE) 5 MG tablet       Immune    Pancytopenia (H)       Hematologic    Aspirin-like platelet function defect (H)       Other    History of bacteremia-Enterococcus Sept 2023    Relevant Orders    Vancomycin level    CBC with platelets and differential    Comprehensive metabolic panel (BMP + Alb, Alk Phos, ALT, AST, Total. Bili, TP)    Adverse drug effect    Relevant Medications    camphor-menthol (DERMASARRA) 0.5-0.5 % external lotion     Other Visit Diagnoses       Need for prophylactic vaccination against hepatitis A        Cancer associated pain        Relevant Medications    oxyCODONE (ROXICODONE) 5 MG tablet           Patient with catheter in place and has follow up with urology tomorrow for trial of void. Did have virtual follow up with [facility] since discharge who recommended continued vancomycin until follow up visit. He is unsure if he will continue this which was discussed today as he wonders if continued rash also related to the vancomycin which he has been on for some time. Was plan to switch to linezolid once off depression medications but does not look like ID wanted to do this until follow up. Previous  aspiration concerning for infected abscess contributing to liver lesion with mets. Increased mets on recent imaging. Continued left upper extremity swelling with improvement in discomfort. Decrease of clot burden on repeat US at discharge reassuring to response to lovenox. Continue lovenox and follow up with oncology. Be seen if worsening swelling, pain or new symptoms develop. Would need repeat US. Rash persistent but improved since he was on levofloxacin and thought fixed drug eruption related. Itching mostly resolved and continues zyrtec. Discussion with patient and his wife today regarding following up with palliative care and we did discuss hospice options in the future if this were something he would like to consider. They are not interested in this today. Hemoglobin improved today and no further discomfort or swelling at hematoma site. He will continue to focus on oral hydration but avoid sodium intake. Appetite has been pretty good since discharge. Follow up with me in one week prior to his [place] follow up. Be seen sooner if new or worsening symptoms or things not continuing to improve. Will plan to increase oxycodone due to ongoing discomfort. Again discussion regarding opiate use and risk of interactions, respiratory suppression, death and addiction discussed.     MED REC REQUIRED  Post Medication Reconciliation Status:  Discharge medications reconciled and changed, see notes/orders      Subjective   New is a 73 year old, presenting for the following health issues:  Swelling (Bilateral swelling in bilateral hands and feet)        2/13/2024     2:09 PM   Additional Questions   Roomed by Vicky COLLINS   Accompanied by Alessandra, wife     History of Present Illness       Reason for visit:  Swelling in all exremities  icreased swelling in left arm due to blood clot  Symptom onset:  1-2 weeks ago    He eats 2-3 servings of fruits and vegetables daily.He consumes 4 sweetened beverage(s) daily.He exercises with enough  effort to increase his heart rate 9 or less minutes per day.    He is taking medications regularly.           2/12/2024    11:06 AM   Post Discharge Outreach   Admission Date 2/5/2024   Reason for Admission acute blood loss anemia requiring PRBC transfusion secondary to rectus sheath hematoma   Discharge Date 2/11/2024   Discharge Diagnosis acute blood loss anemia requiring PRBC transfusion secondary to rectus sheath hematoma   How are you doing now that you are home? per patient's wife, things are OK. she said she has a message out to [place] to see when he needs his next vanco trough. wife said otherwise things are going OK   How are your symptoms? (Red Flag symptoms escalate to triage hotline per guidelines) Improved   Do you feel your condition is stable enough to be safe at home until your provider visit? Yes   Does the patient have their discharge instructions?  Yes   Does the patient have questions regarding their discharge instructions?  No   Were you started on any new medications or were there changes to any of your previous medications?  Yes   Does the patient have all of their medications? Yes   Do you have questions regarding any of your medications?  No   Do you have all of your needed medical supplies or equipment (DME)?  (i.e. oxygen tank, CPAP, cane, etc.) Yes   Discharge follow-up appointment scheduled within 14 calendar days?  Yes   Discharge Follow Up Appointment Date 2/21/2024   Discharge Follow Up Appointment Scheduled with? Primary Care Provider     Hospital Follow-up Visit:    Hospital/Nursing Home/ Rehab Facility: [facility]  Date of Admission: 2/5/24  Date of Discharge: 2/11/24  Reason(s) for Admission: acute blood loss    Was your hospitalization related to COVID-19? No   Problems taking medications regularly:  antibiotics  Medication changes since discharge: None  Problems adhering to non-medication therapy:  None    Summary of hospitalization:  [facility]  hospital discharge summary reviewed  Diagnostic Tests/Treatments reviewed.  Follow up needed: [facility], urology  Other Healthcare Providers Involved in Patient s Care:         Specialist appointment - Oncology, ID, urology , dermatology  Update since discharge: stable.         Plan of care communicated with patient and family                 Review of Systems  Constitutional, HEENT, cardiovascular, pulmonary, GI, , musculoskeletal, neuro, skin, endocrine and psych systems are negative, except as otherwise noted.      Objective    /60 (BP Location: Right arm)   Pulse 88   Temp 96.9  F (36.1  C) (Temporal)   Resp 18   Wt 91.5 kg (201 lb 12.8 oz)   SpO2 100%   BMI 30.68 kg/m    Body mass index is 30.68 kg/m .  Physical Exam   GENERAL: alert and no distress  EYES: Eyes grossly normal to inspection, PERRL and conjunctivae and sclerae normal  HENT: nose and mouth without ulcers or lesions  NECK: no adenopathy, no asymmetry, masses, or scars  RESP: lungs clear to auscultation - no rales, rhonchi or wheezes  CV: regular rate and rhythm, normal S1 S2, 1+ peripheral edema and 2 + in left upper extremity, Radial and DP pulses intact  ABDOMEN: soft, nontender, no masses and bowel sounds normal  MS: no gross musculoskeletal defects noted, no edema  SKIN: Diffuse dryness with mild erythema and excoriation worse on the arms, macular rash faint  NEURO:  mentation intact and speech normal  PSYCH: mentation appears normal, affect depressed            Signed Electronically by: Monico Rivers MD

## 2024-02-14 NOTE — PROGRESS NOTES
Chief Complaint   Patient presents with    Follow Up     Trial void        Quan Murphy is a 73 year old male who presents today for follow up of   Chief Complaint   Patient presents with    Follow Up     Trial void     Patient was admitted recently due to multiple medical problems.  He had a mcdermott catheter placed about 2 weeks ago.  He has h/o RRP and bladder stone removal in the past.    Current Outpatient Medications   Medication Sig Dispense Refill    acetaminophen (TYLENOL) 325 MG tablet Take 1-2 tablets (325-650 mg) by mouth every 6 hours as needed for mild pain 40 tablet 0    camphor-menthol (DERMASARRA) 0.5-0.5 % external lotion Apply 1 mL topically every 6 hours as needed for skin care 222 mL 1    cetirizine (ZYRTEC) 10 MG tablet Take 1 tablet (10 mg) by mouth daily 30 tablet 1    enoxaparin ANTICOAGULANT (LOVENOX) 100 MG/ML syringe Inject 0.9 mLs (90 mg) Subcutaneous every 12 hours 100 mL 0    famotidine (PEPCID) 20 MG tablet Take 20 mg by mouth 2 times daily as needed (heartburn)      hydrOXYzine HCl (ATARAX) 25 MG tablet TAKE 1-2 TABLETS (25-50 MG) BY MOUTH 3 TIMES DAILY AS NEEDED FOR ITCHING 30 tablet 0    levothyroxine (SYNTHROID/LEVOTHROID) 88 MCG tablet Take 1 tablet (88 mcg) by mouth daily 90 tablet 1    melatonin 5 MG tablet Take 5 mg by mouth nightly as needed for sleep      nitroGLYcerin (NITROSTAT) 0.4 MG sublingual tablet For chest pain place 1 tablet under the tongue every 5 minutes for 3 doses. If symptoms persist 5 minutes after 1st dose call 911. 25 tablet 0    oxyCODONE (ROXICODONE) 5 MG tablet Take 1-2 tablets (5-10 mg) by mouth every 6 hours as needed for severe pain MAX OF 8 TABLETS PER  tablet 0    pregabalin (LYRICA) 150 MG capsule TAKE 1 CAPSULE (150 MG) BY MOUTH 2 TIMES DAILY FOR 30 DAYS 60 capsule 0    rosuvastatin (CRESTOR) 40 MG tablet Take 1 tablet (40 mg) by mouth daily 90 tablet 3    tamsulosin (FLOMAX) 0.4 MG capsule Take 1 capsule (0.4 mg) by mouth daily 30 capsule  0    zolpidem (AMBIEN) 10 MG tablet TAKE ONE TABLET BY MOUTH EVERY NIGHT AT BEDTIME AS NEEDED FOR SLEEP 30 tablet 0    naloxone (NARCAN) 4 MG/0.1ML nasal spray Spray 1 spray (4 mg) into one nostril alternating nostrils as needed for opioid reversal every 2-3 minutes until assistance arrives (Patient not taking: Reported on 2/13/2024) 0.2 mL 0    ondansetron (ZOFRAN ODT) 4 MG ODT tab DISSOLVE ONE TABLET BY MOUTH EVERY 8 HOURS AS NEEDED FOR NAUSEA (Patient not taking: Reported on 2/5/2024) 10 tablet 0    vancomycin (VANCOCIN) 1250 mg/250 mL IVPB Inject 1,250 mg into the vein every 24 hours for 21 days (Patient not taking: Reported on 2/14/2024)       Allergies   Allergen Reactions    Levofloxacin Rash     Head to toe    Animal Dander     Augmentin [Amoxicillin-Pot Clavulanate] GI Disturbance    Azithromycin Nausea and Vomiting    Dust Mites     Pollen Extract     Smoke.       Past Medical History:   Diagnosis Date    Allergic rhinitis     Allergy, unspecified not elsewhere classified     Seasonal allergies, pollen, dust, smoke and animals    Antiplatelet or antithrombotic long-term use     Anxiety     Arthritis     Chest pain     Chronic sinusitis     Coronary atherosclerosis of unspecified type of vessel, native or graft     Coronary artery disease    Depressive disorder 1995    Gastroesophageal reflux disease 2020    Cleared with medication    Head injury 1954    Hiatal hernia 2015    Right Side    History of blood transfusion 12/15/2004    Prostate Surgery - My own blood    Hyperlipidemia     Hypertension     Inguinal hernia     Kidney problem 10/08/2017    Lithotripsy    Kidney stones     Malignant neoplasm of prostate (H)     Prostate cancer    Nausea with vomiting 11/03/2023    Prostate cancer (H)     Syncope     Thyroid disease     Tonsil cancer (H)      Past Surgical History:   Procedure Laterality Date    ABDOMEN SURGERY      ARTHRODESIS FOOT  07/23/2013    Procedure: ARTHRODESIS FOOT;  Great Toe Arthrodesis  Left Foot;  Surgeon: Ash Gonzalez DPM;  Location: [facility]    ARTHRODESIS FOOT  06/10/2014    Procedure: ARTHRODESIS FOOT;  Surgeon: Ash Gonzalez DPM;  Location: [facility]    BIOPSY  10/01/2004    dermatologist biopsies    COLONOSCOPY  10/07/2013    Procedure: COLONOSCOPY;  Colonoscopy;  Surgeon: Mike Fallon MD;  Location:  GI    CYSTOSCOPY N/A 02/16/2022    Procedure: CYSTOSCOPY and bladder stone removal;  Surgeon: David Rogers MD;  Location: [facility]    ENDOSCOPIC RETROGRADE CHOLANGIOPANCREATOGRAM N/A 1/15/2024    Procedure: ENDOSCOPIC RETROGRADE CHOLANGIOPANCREATOGRAPHY, WITH stent removal;  Surgeon: Trent Blood MD;  Location:  OR    ENDOSCOPIC RETROGRADE CHOLANGIOPANCREATOGRAM COMPLEX N/A 10/16/2023    Procedure: ENDOSCOPIC RETROGRADE CHOLANGIOPANCREATOGRAPHY, COMPLEX;  Surgeon: Trent Blood MD;  Location:  OR    ENDOSCOPIC ULTRASOUND UPPER GASTROINTESTINAL TRACT (GI) N/A 10/16/2023    Procedure: Endoscopic ultrasound upper gastrointestinal tract (GI);  Surgeon: Trent Blood MD;  Location:  OR    ENT SURGERY      ESOPHAGOSCOPY, GASTROSCOPY, DUODENOSCOPY (EGD), COMBINED N/A 02/12/2020    Procedure: ESOPHAGOGASTRODUODENOSCOPY (EGD);  Surgeon: Sam Escobar MD;  Location: [facility]    EXTRACORPOREAL SHOCK WAVE LITHOTRIPSY (ESWL) Bilateral 10/18/2017    Procedure: EXTRACORPOREAL SHOCK WAVE LITHOTRIPSY (ESWL);  BILATERAL EXTRACORPOREAL SHOCKWAVE LITHOTRIPSY ;  Surgeon: Meir Torres MD;  Location: [facility] OR    HC CORRECT BUNION,SIMPLE  08/11/2005    x3    HC REMV TOE BENIGN BONE LESN  08/11/2005    HEAD & NECK SURGERY  1954    From a fall    HERNIORRHAPHY INGUINAL  07/03/2013    Procedure: HERNIORRHAPHY INGUINAL;  Open Repair Inguinal hernia Right with mesh ;  Surgeon: Sam Escobar MD;  Location:  OR    IR GASTROSTOMY TUBE PERCUTANEOUS PLCMNT  06/07/2021    LAPAROSCOPIC CHOLECYSTECTOMY N/A 10/18/2023    Procedure: CHOLECYSTECTOMY, LAPAROSCOPIC;   Surgeon: Dannie Warner MD;  Location: [facility] OR    MOHS MICROGRAPHIC PROCEDURE  08/23/2011    ear and chin-[facility]    OPEN REDUCTION INTERNAL FIXATION WRIST Right 07/18/2017    Procedure: OPEN REDUCTION INTERNAL FIXATION WRIST;  Right distal radius open reduction and internal fixation;  Surgeon: Pedro Blanca DO;  Location: [facility]    RECONSTRUCT FOREFOOT WITH METATARSOPHALANGEAL (MTP) FUSION  06/10/2014    Procedure: RECONSTRUCT FOREFOOT WITH METATARSOPHALANGEAL (MTP) FUSION;  Surgeon: Ash Gonzalez DPM;  Location: [facility]    STENT, CORONARY, DEMI      SURGICAL HISTORY OF -   1999/1974    lt knee    SURGICAL HISTORY OF -   10/2004    lithotripsy    SURGICAL HISTORY OF -   11/2005    angiogram with stent    VASCULAR SURGERY  11/17/2005    Puncture of iliac artery during and angiogram    [facility] LAPAROSCOPY, SURGICAL PROSTATECTOMY, RETROPUBIC RADICAL, W/NERVE SPARING  11/30/2004    With full bilateral pelvic lymphadenectomy.  [facility].    [facility] TOTAL KNEE ARTHROPLASTY  05/01/2008    Left knee     Family History   Problem Relation Age of Onset    Hypertension Father         Lived to age 87    Connective Tissue Disorder Mother         LUPUS    Heart Disease Mother         Valve replacement    Anxiety Disorder Mother         Lived to age 84    Dementia Mother         Nursing Home (lived to age 86)     Social History     Socioeconomic History    Marital status:      Spouse name: Alessandra    Number of children: 2    Years of education: None    Highest education level: None   Occupational History    Occupation: Retired   Tobacco Use    Smoking status: Never    Smokeless tobacco: Never   Vaping Use    Vaping Use: Never used   Substance and Sexual Activity    Alcohol use: Yes     Alcohol/week: 10.0 standard drinks of alcohol     Types: 10 Cans of beer per week     Comment: Moderate - 1 to two beers per day. ( None lately).    Drug use: No    Sexual activity: Not Currently     Partners: Female     Birth  control/protection: Female Surgical   Other Topics Concern    Parent/sibling w/ CABG, MI or angioplasty before 65F 55M? No     Social Determinants of Health     Financial Resource Strain: Low Risk  (12/28/2023)    Financial Resource Strain     Within the past 12 months, have you or your family members you live with been unable to get utilities (heat, electricity) when it was really needed?: No   Food Insecurity: Low Risk  (12/28/2023)    Food Insecurity     Within the past 12 months, did you worry that your food would run out before you got money to buy more?: No     Within the past 12 months, did the food you bought just not last and you didn t have money to get more?: No   Transportation Needs: Low Risk  (12/28/2023)    Transportation Needs     Within the past 12 months, has lack of transportation kept you from medical appointments, getting your medicines, non-medical meetings or appointments, work, or from getting things that you need?: No   Interpersonal Safety: Low Risk  (1/25/2024)    Interpersonal Safety     Do you feel physically and emotionally safe where you currently live?: Yes     Within the past 12 months, have you been hit, slapped, kicked or otherwise physically hurt by someone?: No     Within the past 12 months, have you been humiliated or emotionally abused in other ways by your partner or ex-partner?: No   Housing Stability: Low Risk  (12/28/2023)    Housing Stability     Do you have housing? : Yes     Are you worried about losing your housing?: No       REVIEW OF SYSTEMS  =================  C: NEGATIVE for fever, chills, change in weight  I: NEGATIVE for worrisome rashes, moles or lesions  E/M: NEGATIVE for ear, mouth and throat problems  R: NEGATIVE for significant cough or SHORTNESS OF BREATH  CV:  NEGATIVE for chest pain, palpitations or peripheral edema  GI: NEGATIVE for nausea, abdominal pain, heartburn, or change in bowel habits  NEURO: NEGATIVE numbness/weakness  : see HPI  PSYCH:  NEGATIVE depression/anxiety  LYmph: no new enlarged lymph nodes  Ortho: no new trauma/movements    Physical Exam:  /75   Pulse 67   Temp 97.7  F (36.5  C)   SpO2 90%    Patient is pleasant, in no acute distress, good general condition.  Lung: no evidence of respiratory distress    Abdomen: Soft, nondistended, non tender. No masses. No rebound or guarding.   Exam: mcdermott catheter in placed.  Skin: Warm and dry.  No redness.  Psych: normal mood and affect  Neuro: alert and oriented  Musculaskeletal: moving all extremities    Assessment/Plan:   (R33.9) Urinary retention  (primary encounter diagnosis)  Comment: trial of void performed 300 ml placed and he voided out 300 ml  Plan: passed tov.  Rtc prn.

## 2024-02-20 NOTE — PROGRESS NOTES
Infusion Nursing Note:  Quan Murphy presents today for PICC labs and dressing change.    Patient seen by provider today: No   present during visit today: Not Applicable.    Note: Pt here today with his wife for PICC dressing change and PICC labs. Patient has appt down at [place] tomorrow with infectious disease doctor.  Patient was taking vancomycin but ended up having to stop it because of an allergic reaction.  Patient skin is red, irritated and dry from allergic reaction.       Intravenous Access:  PICC.    Treatment Conditions:  Not Applicable.      Post Infusion Assessment:  Blood return noted, labs collected and sent to lab      Discharge Plan:   Patient discharged in stable condition accompanied by: self and wife.  Departure Mode: Ambulatory.  Patient has appt to return to infusion on 03/05/2024      Sol Yarbrough RN

## 2024-02-21 NOTE — PROGRESS NOTES
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)       Digestive    Metastatic cancer to liver (H)    Liver abscess       Endocrine    Hypothyroidism due to acquired atrophy of thyroid    Relevant Orders    TSH with free T4 reflex       Circulatory    Hypertension goal BP (blood pressure) < 140/90    Acute deep vein thrombosis (DVT) of axillary vein of left upper extremity (H)       Musculoskeletal and Integumentary    Malignant neoplasm metastatic to bone (H)       Hematologic    Anemia, unspecified type       Other    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  Continues on Lovenox shots now until follow-up with hematology oncology next week.  Continue linezolid and follow-up with infectious disease and hopeful to get off of this.  Would plan to restart Cymbalta when he is able.  I think unlikely antibiotic contributing to his swelling and patient does have other reasons for his edema in lower extremities with negative ultrasound in the past.  Importance of protein intake given his malnutrition discussed.  Will start doing ensures twice daily.  Will also start compression stockings now.  Would also consider repeat echocardiogram but no other signs of fluid overload with clear lung exam today.  Could consider diuresis going forward.  Hemoglobin slowly improving I think this will help his edema as well.  I did give him small amount of Ativan to have  on hand for severe panic episodes and lengthy discussion today regarding risk of benzodiazepine and opioid use.  Would not use simultaneousl with risk of respiratory depression, overdose and death discussed.  Consider follow-up with psychology and palliative care going forward.  Repeat TSH today.     MED REC REQUIRED  Post Medication Reconciliation Status:  Discharge medications reconciled and changed, see notes/orders        Subjective   New is a 73 year old, presenting for the following health issues:  Hospital F/U        2/21/2024     9:51 AM   Additional Questions   Roomed by Briana longo         2/21/2024     9:52 AM   Patient Reported Additional Medications   Patient reports taking the following new medications Linezolid     HPI         2/12/2024    11:06 AM   Post Discharge Outreach   Admission Date 2/5/2024   Reason for Admission acute blood loss anemia requiring PRBC transfusion secondary to rectus sheath hematoma   Discharge Date 2/11/2024   Discharge Diagnosis acute blood loss anemia requiring PRBC transfusion secondary to rectus sheath hematoma   How are you doing now that you are home? per patient's wife, things are OK. she said she has a message out to [facility] to see when he needs his next vanc trough. wife said otherwise things are going OK   How are your symptoms? (Red Flag symptoms escalate to triage hotline per guidelines) Improved   Do you feel your condition is stable enough to be safe at home until your provider visit? Yes   Does the patient have their discharge instructions?  Yes   Does the patient have questions regarding their discharge instructions?  No   Were you started on any new medications or were there changes to any of your previous medications?  Yes   Does the patient have all of their medications? Yes   Do you have questions regarding any of your medications?  No   Do you have all of your needed medical supplies or equipment (DME)?  (i.e. oxygen tank, CPAP, cane, etc.) Yes   Discharge  follow-up appointment scheduled within 14 calendar days?  Yes   Discharge Follow Up Appointment Date 2/21/2024   Discharge Follow Up Appointment Scheduled with? Primary Care Provider                Following up again today after hospital follow-up last week.  Did again speak with infectious disease after visit and transition to linezolid but patient does note some tingling into his fingers and feels like swelling is worse in his legs since then.  Left arm swelling improving and discomfort gone.  Still has some mild erythematous and itchy skin but this is also improving.  Ultrasound of left upper extremity improving upon discharge and did have negative bilateral lower extremity ultrasounds.  Patient notes he has not been doing ensures as much as he has in the past but appetite has been better.  Willing to start these again now.  He does have follow-up infectious disease tomorrow and oncology next week.  Patient without fevers or respiratory symptoms.  Abdominal pain resolved.  Stools have been normal.  Urinating without issues and catheter pulled after urology follow-up last week.  Does note some more episodes of anxiety related to his cancer and now off of Cymbalta on Celexa.  He is interested in restarting Cymbalta when he can after antibiotics but wondering about small amount of Ativan to have on hand for severe episodes.  Did have this in the past and was given in the hospital which did calm things down for him quite a bit.  Continues on his chronic opiates.      Review of Systems  Constitutional, HEENT, cardiovascular, pulmonary, GI, , musculoskeletal, neuro, skin, endocrine and psych systems are negative, except as otherwise noted.      Objective    /64   Pulse 80   Temp 98.5  F (36.9  C)   Resp 14   Wt 89.3 kg (196 lb 12.8 oz)   SpO2 99%   BMI 29.92 kg/m    Body mass index is 29.92 kg/m .  Physical Exam   GENERAL: alert and no distress  EYES: Eyes grossly normal to inspection, PERRL and  conjunctivae and sclerae normal  HENT: nose and mouth without ulcers or lesions  RESP: lungs clear to auscultation - no rales, rhonchi or wheezes  CV: regular rate and rhythm, normal S1 S2, 1+ edema in left upper extremity and bilateral lower extremities, DP and radial pulses intact   ABDOMEN: soft, nontender, no hepatosplenomegaly, no masses and bowel sounds normal  MS: no gross musculoskeletal defects noted, no edema  SKIN: no suspicious lesions or rashes  NEURO: mentation intact and speech normal  PSYCH: mentation appears normal, affect normal/bright            Signed Electronically by: Monico Rivers MD

## 2024-02-21 NOTE — TELEPHONE ENCOUNTER
Unable to reach patient today. He should not stop all anticoagulation given his clot and cancer. He could switch to an oral medication like  Apixaban 5 mg twice daily but I would like to discuss with him prior. Can call again tomorrow.

## 2024-02-21 NOTE — PROGRESS NOTES
Clinic Care Coordination Contact  Care Team Conversations    RN CC received a voicemail from patient's wife asking for a call back.     RN CC called and spoke to patient's wife (consent to communicate on file). She said the patient has a lot going on. She said the patient saw Dr. Rivers today and forgot to mention his Lovenox.     Wife said the patient's legs are very swollen. She said the patient's legs are cracking and bleeding from the swelling. Wife said the patient is not mobile right now due to the swelling. Wife and patient are hoping to stop the Lovenox. They want to see if stopping the medication will help the swelling. Wife is wondering if there is an oral medication patient can take instead of the Lovenox. Wife said the patient needs to get off the Lovenox.     Wife said the patient has decided he is not going to do another Lovenox shot right now. She said both her and the patient know the risk of stopping this medication. She said they are desperate to get this swelling down.     RN CC will send a message to Primary Care Provider to review and advise.     Rossana Moore RN Care Coordination   [facility] [place]  Email: [email or web address]  Phone: [phone]

## 2024-02-21 NOTE — TELEPHONE ENCOUNTER
Medication Question or Refill    Contacts         Type Contact Phone/Fax    02/21/2024 02:20 PM CST Phone (Incoming) New Murphy (Self) [phone] (H)            What medication are you calling about (include dose and sig)?: patient would like to stop taking LOVENOX Injection and go to a oral medication.    Preferred Pharmacy:   [facility], [place] - [address]   [place] [address]  Phone: [phone] Fax: [phone]        Controlled Substance Agreement on file:   CSA -- Patient Level:    CSA: None found at the patient level.       Who prescribed the medication?:     When did you use the medication last? today        Do you have any questions or concerns?  Yes: patient wants to stop taking LOVENOX and go to a oral medication. He is thinking hi legs and feet will be better.      Could we send this information to you in [facility] or would you prefer to receive a phone call?:   Patient would prefer a phone call   Okay to leave a detailed message?: Yes at Cell number on file:    Telephone Information:   Mobile [phone]

## 2024-02-22 NOTE — TELEPHONE ENCOUNTER
I called and gave New the below message from Dr. Rivers.  I did tell him that Dr. Rivers needs to talk to him before medication can be prescribed.  He stated he will want to talk to Dr. Rivers.  He will keep his phone with him.

## 2024-02-22 NOTE — TELEPHONE ENCOUNTER
Spoke with patient who is currently at the [facility]. We did discuss risk of clot progression and propensity with NOAC vs lovenox in setting of malignancy. It may help with swelling but its unclear if that will change. He does have CKD. Risk of bleed and recurrent hematoma also discussed today as well as reversibility if he did have a bleeding event. He did stop injections yesterday on his own so it will be fine to start apixaban today. He will discuss with ID provider and follow up with oncology. Potential costs also discussed.

## 2024-02-28 NOTE — PROGRESS NOTES
Clinic Care Coordination Contact  Care Team Conversations    RN CC called and spoke to patient's wife Alessandra. She said they are currently down at the [facility] and will be down there another night or two. She said at this point, there isn't anything they need. She said she thinks they have everything taken care of. She would like RUTH BELTRAN to reach out in 1-2 weeks. We will plan to establish goals at that time.     Rossana Moore RN Care Coordination   [facility] [place]  Email: [email or web address]  Phone: [phone]

## 2024-03-01 NOTE — TELEPHONE ENCOUNTER
Lorazepam sent to use for severe episodes only.  Patient going through quickly.  Please remind him of this but refill sent.

## 2024-03-05 NOTE — TELEPHONE ENCOUNTER
Nurse Triage SBAR    Is this a 2nd Level Triage? YES, LICENSED PRACTITIONER REVIEW IS REQUIRED    Situation: Patient calling in with concern for yeast infection in mouth. His tongue has been more swollen the last 4-5 days and throat is very sore. No visible sores that wife (who is an RN) could see.    Background: Patient is currently receiving chemotherapy for his cancer at the [facility], is on strong antibiotic for a liver abscess.    Assessment: Patient is not having any respiratory distress, no difficulty breathing, no shortness of breath. He is able to swallow his salvia and eat. No lesions in mouth.     No fevers, no swelling of face, no redness of face.     Protocol Recommended Disposition:   Go to ED Now    Recommendation: Patient does not want to go to ED as this has been going on for several days and he has had yeast infections in the past and  this seems similar.     Message handled by Nurse Triage with Huddle - provider name: Dr. Rivers . Huddled with PCP to see if are okay with seeing patient in clinic as he does not want to go to ED or drive to [place] for [place] to look at his mouth. Provider okay with seeing patient in clinic. Patient scheduled with 2:20 arrival time.     Patient notified that appointment is appropriate per his PCP and went over red flag symptoms with patient.     Does the patient meet one of the following criteria for ADS visit consideration? No    Jennifer Oshea RN on 3/5/2024 at 10:30 AM    Reason for Disposition   Tongue (or mouth floor under the tongue) is very swollen and tender    Additional Information   Negative: SEVERE difficulty breathing (e.g., struggling for each breath, speaks in single words, stridor)   Negative: Sounds like a life-threatening emergency to the triager   Negative: Followed a tooth (or teeth) injury   Negative: Followed a mouth injury   Negative: Throat is painful   Negative: Cold sore suspected (i.e., fever blister sore on the outer lip)    Negative: Tooth is painful or swelling around a tooth   Negative: Drooling or spitting out saliva (because can't swallow) and new-onset   Negative: Chemical or electrical burn of mouth    Protocols used: Mouth Pain-A-OH

## 2024-03-05 NOTE — TELEPHONE ENCOUNTER
Additional Information   Negative: SEVERE difficulty breathing (e.g., struggling for each breath, speaks in single words, stridor)   Negative: Sounds like a life-threatening emergency to the triager   Negative: Followed a tooth (or teeth) injury   Negative: Followed a mouth injury   Negative: Throat is painful   Negative: Cold sore suspected (i.e., fever blister sore on the outer lip)   Negative: Tooth is painful or swelling around a tooth   Negative: Drooling or spitting out saliva (because can't swallow) and new-onset   Negative: Chemical or electrical burn of mouth    Protocols used: Mouth Pain-A-OH

## 2024-03-05 NOTE — PROGRESS NOTES
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)       Digestive    Metastatic cancer to liver (H)    Liver abscess       Endocrine    Hypothyroidism due to acquired atrophy of thyroid       Circulatory    Hypertension goal BP (blood pressure) < 140/90    Coronary atherosclerosis    Acute deep vein thrombosis (DVT) of axillary vein of left upper extremity (H)    Relevant Orders     Upper Extremity Venous Duplex Left (Completed)       Musculoskeletal and Integumentary    Malignant neoplasm metastatic to bone (H)    Relevant Medications    budesonide (ENTOCORT EC) 3 MG EC capsule    Rectus sheath hematoma, initial encounter    Relevant Medications    budesonide (ENTOCORT EC) 3 MG EC capsule       Other    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  This has not been treated by oncology given concern for esophageal involvement he would benefit from oral antifungals.  We did discuss risk and benefits of fluconazole versus nystatin swish and swallow.  Very concerned for this affecting his liver which was discussed with patient today more concerning in the setting of previous liver damage with his immunotherapy as well as current liver abscess and metastatic lesions.  Right now we will plan for 14-day course of fluconazole.  Follow-up with me upon completion.  Also discussed his anticoagulation today as it does not look like this has been addressed since his visit to the [place] and vascular recommendations.  Unfortunately there is no good option for  patient at this time and we did discuss the risk and benefits of continued anticoagulation in the setting of his hematoma as well as risk of worsening of his clot burden.  Ultrasound done today with mild improvement but persistent DVT in his internal jugular subclavian and axillary veins.  Asymptomatic with this now as well as with the hematoma found on imaging.  We did discuss potentially decreasing to 2.5 mg twice daily as a middle ground but patient would like to continue at full dose given persistent clot.  Will be seen immediately if further signs of bleeding.  Upcoming follow-up with oncology and will discuss with them again at that time.  Let me know sooner if new or worsening issues arise.  Will plan to reach out to infectious disease and inform of fluconazole use.     MED REC REQUIRED  Post Medication Reconciliation Status:  Discharge medications reconciled and changed, see notes/orders    Spent 35 minutes with patient and exam and history as well as education today, 10 minutes in chart review    Subjective   New is a 73 year old, presenting for the following health issues:  Throat Pain (Thinks it may be yeast in the throat)        3/5/2024     2:54 PM   Additional Questions   Roomed by Clemente Santiago CMA     History of Present Illness       Reason for visit:  Sore mouth and throat  Symptom onset:  3-7 days ago  Symptom intensity:  Moderate  Symptom progression:  Worsening  Had these symptoms before:  Yes  Has tried/received treatment for these symptoms:  Yes  Previous treatment was successful:  Yes    He eats 0-1 servings of fruits and vegetables daily.He consumes 0 sweetened beverage(s) daily.He exercises with enough effort to increase his heart rate 20 to 29 minutes per day.  He exercises with enough effort to increase his heart rate 3 or less days per week.   He is taking medications regularly.     Patient here today with sore mouth with swallowing has noted redness.  History of thrush in the past and  "recently started on his immunotherapy.  Did recently see oncology and PET scan did show some spots in his throat as well as esophagus with concern for candidiasis but he has not been seen by them since.  They did not start treatment.  Of note he was also found to have hematoma again and rectus sheath on imaging but he has no symptoms of this now.  Was switched to oral anticoagulation after he did not want to continue with Lovenox injections.  Patient with previous symptomatic hematoma in the past.  With review of records it appears oncology at the [facility] did send for E consult to vascular regarding his anticoagulation of his left upper extremity DVT versus bleeding risk from his hematoma.  No consensus on treatment moving forward but discussed weighing pros and cons of both.  Patient had no knowledge of this today.  He does have follow-up with [facility] and so far tolerate immunotherapy without issues.  Previously did have liver damage related to his immunotherapy so was started on steroids with restarting treatment which did improve his cancer in the past.  Currently spreading lesions which was the reason for decision to go back to treatment.  No blood in the stool or urine.  Swelling in his left upper extremity resolved.  Patient without respiratory symptoms.  Edema resolving with good improvement from what it was in his lower extremities.    Review of Systems  Constitutional, HEENT, cardiovascular, pulmonary, GI, , musculoskeletal, neuro, skin, endocrine and psych systems are negative, except as otherwise noted.      Objective    /64 (BP Location: Right arm, Patient Position: Chair, Cuff Size: Adult Regular)   Pulse 105   Temp 97.9  F (36.6  C) (Temporal)   Resp 12   Ht 1.727 m (5' 8\")   Wt 83.5 kg (184 lb)   SpO2 98%   BMI 27.98 kg/m    Body mass index is 27.98 kg/m .  Physical Exam   GENERAL: alert and no distress  EYES: Eyes grossly normal to inspection, PERRL and conjunctivae and sclerae normal  HENT: " Mouth with diffuse erythematous changes without ulcerations or lesions noted  NECK: Possible lymph node enlargement without tenderness to palpation or asymmetry  RESP: lungs clear to auscultation - no rales, rhonchi or wheezes  CV: regular rate and rhythm, normal S1 S2, no murmur, trace peripheral edema in bilateral lower extremities  ABDOMEN: soft, nontender, no hepatosplenomegaly, no masses and bowel sounds normal  MS: no gross musculoskeletal defects noted, no edema  SKIN: mild erythema and xerosis of legs and arms,   NEURO: mentation intact and speech normal  PSYCH: mentation appears normal, affect normal/bright      Signed Electronically by: Monico Rivers MD

## 2024-03-13 NOTE — LETTER
[facility] CARE COORDINATION  [address]   [place] [address]    March 13, 2024    Quan Murphy  [address]  [place] [address]      Dear Quan,    I am a clinic care coordinator who works with Monico Rivers MD with the [facility]. I wanted to thank you for spending the time to talk with me.  Below is a description of clinic care coordination and how I can further assist you.       The clinic care coordination team is made up of a registered nurse, , financial resource worker and community health worker who understand the health care system. The goal of clinic care coordination is to help you manage your health and improve access to the health care system. Our team works alongside your provider to assist you in determining your health and social needs. We can help you obtain health care and community resources, providing you with necessary information and education. We can work with you through any barriers and develop a care plan that helps coordinate and strengthen the communication between you and your care team.  Our services are voluntary and are offered without charge to you personally.    Please feel free to contact me with any questions or concerns regarding care coordination and what we can offer.      We are focused on providing you with the highest-quality healthcare experience possible.    Sincerely,     Rossana Moore RN Care Coordination   [facility]-  [place], [place], [place]  Phone: [phone]      Enclosed: I have enclosed a copy of the Patient Centered Plan of Care. This has helpful information and goals that we have talked about. Please keep this in an easy to access place to use as needed.

## 2024-03-13 NOTE — LETTER
[facility]  Patient Centered Plan of Care  About Me:        Patient Name:  Quan Montoya    YOB: 1950  Age:         73 year old   Ricardo MRN:    [id number] Telephone Information:  Home Phone [phone]   Mobile [phone]       Address:  [address] [facility] [address] Email address:  [email or web address]      Emergency Contact(s)    Name Relationship Lgl Grd Work Phone Home Phone Mobile Phone   1. MARIA T MONTOYA Spouse No   [phone]   2. GORDON MONTOYA Son No   [phone]   3. FRANCISCO JAVIER MONTOYA Son No   [phone]           Primary language:  English     needed? No   [facility] [employer] [facility]:  [phone] op. 1  Other communication barriers:None    Preferred Method of Communication:  Mail  Current living arrangement: I live in a private home with spouse    Mobility Status/ Medical Equipment: No data recorded      Health Maintenance  Health Maintenance Reviewed: Due/Overdue   Health Maintenance Due   Topic Date Due    HEPATITIS A IMMUNIZATION (1 of 2 - Risk 2-dose series) Never done    MEDICARE ANNUAL WELLNESS VISIT  07/15/2021           My Access Plan  Medical Emergency 911   Primary Clinic Line [facility]* - [phone]   24 Hour Appointment Line [phone] or  [phone]) (toll-free)   24 Hour Nurse Line [phone] (toll-free)   Preferred Urgent Care No data recorded   Preferred Hospital [facility]  [phone]     Preferred Pharmacy [facility] [place], [place] - [address]      Behavioral Health Crisis Line The National Suicide Prevention Lifeline at 1-350.171.5124 or Text/Call 218           My Care Team Members  Patient Care Team         Relationship Specialty Notifications Start End    Monico Rivers MD PCP - General Family Medicine  1/17/23     Phone: [phone] Fax: [phone]         [address] [place] [address]    Elier Serrano MD  Assigned Palliative Care Provider   5/23/21     Phone: [phone] Fax: [phone]         [address] Palliative Care [facility] [address]    Aramis Ch MD Assigned Heart and Vascular Provider   11/12/22     Phone: [phone] Fax: [phone] [address] [facility] [address]    Shanthi Schrader MD Assigned Cancer Care Provider   12/3/22     Phone: [phone] Fax: [phone]         [address]  [facility] [address]    Monico Rivers MD Assigned PCP   1/21/23     Phone: [phone] Fax: [phone] [address] [place] [address]    David Rogers MD MD Urology  6/12/23     Phone: [phone] Fax: [phone] [address] [address]    David Rogers MD MD Urology  6/12/23     Phone: [phone] Fax: [phone] [address] [address]    David Rogers MD Assigned Surgical Provider   11/18/23     Phone: [phone] Fax: [phone] [address] [address]    Rossana Moore RN Lead Care Coordinator  Admissions 2/1/24                 My Care Plans  Self Management and Treatment Plan    Care Plan  Care Plan: Health Maintenance       Problem: Health Maintenance Due or Overdue       Goal: Become up-to-date with health maintenance visit(s)       Start Date: 3/13/2024 Expected End Date: 9/9/2024    This Visit's Progress: 0%    Note:     Barriers: declining health, cancer diagnosis  Strengths: engaged and motivated  Patient expressed understanding of goal: yes  Action steps to achieve this goal:  1. I will ask to update HM at appointments  2. I will scheduled appts for important HM items  3. I will work with the CHW to complete my overdue HM   4. I will call RN CC with any questions or concerns                            Care Plan: Health Care System       Problem: Navigating health care system       Goal: Assist patient and caregiver with navigating  the health care system       Start Date: 3/13/2024 Expected End Date: 9/9/2024    This Visit's Progress: 0%    Note:     Barriers: declining health, cancer diagnosis  Strengths: motivated and engaged  Patient expressed understanding of goal: yes  Action steps to achieve this goal:  1. I will call and make primary care appointments at [phone]  2. I will call and make specialty appointments at [phone]  3. I will contact RNCC with any questions or concerns about care or appointments.                                  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, insomnia type    Allergic conjunctivitis    Anxiety    Hyperlipidemia LDL goal <130    Hypertension goal BP (blood pressure) < 140/90    Inguinal hernia    Degenerative arthritis of foot    Hallux rigidus    Coronary atherosclerosis    Arthropathy    Chest pain    Status post insertion of drug-eluting stent into left anterior descending artery for coronary artery disease    S/P ORIF (open reduction internal fixation) fracture    Closed Colles' fracture of right radius with routine healing, subsequent encounter    Syncope    Renal hematoma    Retroperitoneal hematoma    Hiatal hernia    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)    Hypomagnesemia    Failure to thrive (0-17)    Chronic kidney disease, stage 3 (H)    Uncomplicated opioid dependence (H)    Pancytopenia (H)    Malignant neoplasm metastatic to bone (H)     Hypothyroidism due to acquired atrophy of thyroid    Acute cholecystitis    Gallstone pancreatitis    Hypokalemia    Inadequate oral nutritional intake    Metastatic cancer to liver (H)    History of bacteremia-Enterococcus Sept 2023    S/P ERCP, sphincterotomy with stent placement, lap cholecystectomy 10/16-10/18/23    Hypophosphatemia    Hypoalbuminemia    Anemia, unspecified type    Elevated lactic acid level    Abnormal chest CT-RLL opacity    Abnormal abdominal CT scan-new extrahepatic 2 cm low attenuation area with fat stranding    Metabolic acidosis, normal anion gap (NAG)    Oropharyngeal dysphagia    Elevated alkaline phosphatase level    Bile salt-induced diarrhea    Nausea with vomiting    Liver abscess    Rectus sheath hematoma, initial encounter    Acute deep vein thrombosis (DVT) of axillary vein of left upper extremity (H)    Medication induced coagulopathy  (H24)    Anemia due to blood loss, acute    PICC (peripherally inserted central catheter) in place    Urinary catheter in place    Aspirin-like platelet function defect (H)    Thrombocytopenia (H24)    Adverse drug effect      Current Medications and Allergies:    Allergies   Allergen Reactions    Levofloxacin Rash     Head to toe    Animal Dander     Augmentin [Amoxicillin-Pot Clavulanate] GI Disturbance    Azithromycin Nausea and Vomiting    Dust Mites     Pollen Extract     Smoke.      Current Outpatient Medications   Medication    acetaminophen (TYLENOL) 325 MG tablet    apixaban ANTICOAGULANT (ELIQUIS) 5 MG tablet    budesonide (ENTOCORT EC) 3 MG EC capsule    camphor-menthol (DERMASARRA) 0.5-0.5 % external lotion    cetirizine (ZYRTEC) 10 MG tablet    enoxaparin ANTICOAGULANT (LOVENOX) 100 MG/ML syringe    famotidine (PEPCID) 20 MG tablet    fluconazole (DIFLUCAN) 200 MG tablet    hydrOXYzine HCl (ATARAX) 25 MG tablet    levothyroxine (SYNTHROID/LEVOTHROID) 100 MCG tablet    linezolid (ZYVOX) 600 MG tablet    LORazepam (ATIVAN) 0.5 MG tablet     melatonin 5 MG tablet    naloxone (NARCAN) 4 MG/0.1ML nasal spray    nitroGLYcerin (NITROSTAT) 0.4 MG sublingual tablet    ondansetron (ZOFRAN ODT) 4 MG ODT tab    oxyCODONE (ROXICODONE) 5 MG tablet    pregabalin (LYRICA) 150 MG capsule    rosuvastatin (CRESTOR) 40 MG tablet    zolpidem (AMBIEN) 10 MG tablet     No current facility-administered medications for this visit.         Care Coordination Start Date: 2/1/2024   Frequency of Care Coordination: No data recorded   Form Last Updated: 03/13/2024

## 2024-03-13 NOTE — PROGRESS NOTES
Clinic Care Coordination Contact    OUTREACH    Referral Information:  Referral Source: IP Handoff    Chief Complaint   Patient presents with    Clinic Care Coordination - Follow-up     RN CC- ED follow up        Universal Utilization: 97.2% Admission or ED Risk  Clinic Utilization  Difficulty keeping appointments:: No  Compliance Concerns: No  No-Show Concerns: No  No PCP office visit in Past Year: No  Utilization      No Show Count (past year)  2             ED Visits  8             Hospital Admissions  6                    Current as of: 3/13/2024  8:00 AM              Clinical Concerns:  Current Medical Concerns:  RN CC called and spoke to patient's wife Alessandra. Patient has a lot of medical things going on right now. He has cancer and has been struggling off and on. RN CC has reached out to wife and patient multiple times but we have never been able to fully finish enrollment. This was done today. Wife said the patient was in the emergency room yesterday for dehydration. Wife said the patient is feeling so so today. She said he tried to increase his fluids and tried to eat some supper last night. She said it resulted in him vomiting. Wife said the patient is weak and tired. She said he has lost 7 lbs in the last week. She said the patient really struggles with no appetite. She said he does do well with protein shakes. Wife said as of now, they have everything settled. She said the patient is working on hydration and rest. Wife can't think of any needs at this time.    Current Behavioral Concerns: None noted.        Education Provided to patient: RN CC advised patient and/or caregiver to call Primary Care Provider's office with any urgent questions or concerns. Advised patient and/or care giver to call the pharmacy for any refill requests needed. Advised patient and/or care giver to call RN CC with any non urgent needs or concerns.      Health Maintenance Reviewed: Due/Overdue   Health Maintenance Due   Topic Date  Due    HEPATITIS A IMMUNIZATION (1 of 2 - Risk 2-dose series) Never done    MEDICARE ANNUAL WELLNESS VISIT  07/15/2021       Clinical Pathway: None    Medication Management:  Medication review status: Medications reviewed and no changes reported per patient.           Functional Status:  Dependent ADLs:: Independent  Dependent IADLs:: Independent    Living Situation:  Current living arrangement:: I live in a private home with spouse    Lifestyle & Psychosocial Needs:    Social Determinants of Health     Food Insecurity: Low Risk  (12/28/2023)    Food Insecurity     Within the past 12 months, did you worry that your food would run out before you got money to buy more?: No     Within the past 12 months, did the food you bought just not last and you didn t have money to get more?: No   Depression: Not at risk (1/4/2024)    PHQ-2     PHQ-2 Score: 0   Housing Stability: Low Risk  (12/28/2023)    Housing Stability     Do you have housing? : Yes     Are you worried about losing your housing?: No   Tobacco Use: Low Risk  (3/5/2024)    Patient History     Smoking Tobacco Use: Never     Smokeless Tobacco Use: Never     Passive Exposure: Not on file   Financial Resource Strain: Low Risk  (12/28/2023)    Financial Resource Strain     Within the past 12 months, have you or your family members you live with been unable to get utilities (heat, electricity) when it was really needed?: No   Alcohol Use: Not on file   Transportation Needs: Low Risk  (12/28/2023)    Transportation Needs     Within the past 12 months, has lack of transportation kept you from medical appointments, getting your medicines, non-medical meetings or appointments, work, or from getting things that you need?: No   Physical Activity: Not on file   Interpersonal Safety: Low Risk  (3/5/2024)    Interpersonal Safety     Do you feel physically and emotionally safe where you currently live?: Yes     Within the past 12 months, have you been hit, slapped, kicked or  otherwise physically hurt by someone?: No     Within the past 12 months, have you been humiliated or emotionally abused in other ways by your partner or ex-partner?: No   Stress: Not on file   Social Connections: Not on file     Inadequate nutrition (GOAL):: Yes  Tube Feeding: No  Transportation means:: Family     Informal Support system:: Spouse      Resources and Interventions:  Current Resources:      Community Resources: None  Supplies Currently Used at Home: None  Equipment Currently Used at Home: none  Employment Status: retired     Advance Care Plan/Directive  Advanced Care Plans/Directives on file:: Yes  Type Advanced Care Plans/Directives: Advanced Directive - On File    Referrals Placed: None    Care Plan:  Care Plan: Health Maintenance       Problem: Health Maintenance Due or Overdue       Goal: Become up-to-date with health maintenance visit(s)       Start Date: 3/13/2024 Expected End Date: 9/9/2024    This Visit's Progress: 0%    Note:     Barriers: declining health, cancer diagnosis  Strengths: engaged and motivated  Patient expressed understanding of goal: yes  Action steps to achieve this goal:  1. I will ask to update HM at appointments  2. I will scheduled appts for important HM items  3. I will work with the CHW to complete my overdue HM   4. I will call RN CC with any questions or concerns                            Care Plan: Health Care System       Problem: Navigating health care system       Goal: Assist patient and caregiver with navigating the health care system       Start Date: 3/13/2024 Expected End Date: 9/9/2024    This Visit's Progress: 0%    Note:     Barriers: declining health, cancer diagnosis  Strengths: motivated and engaged  Patient expressed understanding of goal: yes  Action steps to achieve this goal:  1. I will call and make primary care appointments at [phone]  2. I will call and make specialty appointments at [phone]  3. I will contact RNCC with any questions or  concerns about care or appointments.                                  Patient/Caregiver understanding: Patient/caregiver verbalized understanding and denies any additional questions or concerns at this time. RNCC engaged in [employer] communications during encounter.        Future Appointments                In 2 weeks [facility]    In 1 month [facility]    In 1 month [facility]    In 1 month [facility]    In 3 months [id number]    In 3 months Aramis Ch MD [facility]            Plan: Care Coordination enrollment completed. Introduction letter and complex care plan sent to patient via [email or web address]. Wife and patient would like a call in 2 weeks to follow up. Wife and patient will reach out in the meantime if hey need anything.     Rossana Moore RN Care Coordination   [facility] Hospital[facility]  Email: [email or web address]  Phone: [phone]

## 2024-03-13 NOTE — DISCHARGE INSTRUCTIONS
Recheck of labs as scheduled by your care team.  Drink plenty of fluids.    Return for any fevers, vomiting, abdominal pain, or any symptoms of concern.

## 2024-03-13 NOTE — ED PROVIDER NOTES
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 He is noted to have creatinine 2.43 (4 days ago was 2.62) and this is up from 1.2 a few weeks ago. Patient reports decreased intake. Denies fevers or chills. Denies abdominal or flank pain.  Denies nausea, vomiting or diarrhea.    Outpatient Labs done earlier today:    Results for orders placed or performed in visit on 03/12/24 (from the past 24 hour(s))   CBC with Platelets & Differential    Narrative    The following orders were created for panel order CBC with Platelets & Differential.  Procedure                               Abnormality         Status                     ---------                               -----------         ------                     CBC with platelets and d...[[id number]]  Abnormal            Final result                 Please view results for these tests on the individual orders.   Comprehensive metabolic panel   Result Value Ref Range    Sodium 135 135 - 145 mmol/L    Potassium 4.0 3.4 - 5.3 mmol/L    Carbon Dioxide (CO2) 13 (L) 22 - 29 mmol/L    Anion Gap 14 7 - 15 mmol/L    Urea Nitrogen 42.6 (H) 8.0 - 23.0 mg/dL    Creatinine 2.43 (H) 0.67 - 1.17 mg/dL    GFR Estimate 28 (L) >60 mL/min/1.73m2    Calcium 9.9 8.8 - 10.2 mg/dL    Chloride 108 (H) 98 - 107 mmol/L    Glucose 118 (H) 70 - 99 mg/dL    Alkaline Phosphatase 214 (H) 40 - 150 U/L    AST 44 0 - 45 U/L    ALT 34 0 - 70 U/L    Protein Total 7.3 6.4 - 8.3 g/dL    Albumin 3.7 3.5 - 5.2 g/dL    Bilirubin Total 0.3 <=1.2 mg/dL   CBC with platelets and differential   Result Value Ref Range    WBC Count 7.2 4.0 - 11.0 10e3/uL    RBC Count  4.05 (L) 4.40 - 5.90 10e6/uL    Hemoglobin 11.7 (L) 13.3 - 17.7 g/dL    Hematocrit 36.4 (L) 40.0 - 53.0 %    MCV 90 78 - 100 fL    MCH 28.9 26.5 - 33.0 pg    MCHC 32.1 31.5 - 36.5 g/dL    RDW 18.4 (H) 10.0 - 15.0 %    Platelet Count 114 (L) 150 - 450 10e3/uL    % Neutrophils 63 %    % Lymphocytes 24 %    % Monocytes 12 %    % Eosinophils 0 %    % Basophils 0 %    % Immature Granulocytes 0 %    NRBCs per 100 WBC 0 <1 /100    Absolute Neutrophils 4.6 1.6 - 8.3 10e3/uL    Absolute Lymphocytes 1.8 0.0 - 5.3 10e3/uL    Absolute Monocytes 0.9 0.0 - 1.3 10e3/uL    Absolute Eosinophils 0.0 0.0 - 0.7 10e3/uL    Absolute Basophils 0.0 0.0 - 0.2 10e3/uL    Absolute Immature Granulocytes 0.0 <=0.4 10e3/uL    Absolute NRBCs 0.0 10e3/uL     *Note: Due to a large number of results and/or encounters for the requested time period, some results have not been displayed. A complete set of results can be found in Results Review.         Allergies:  Allergies   Allergen Reactions    Levofloxacin Rash     Head to toe    Animal Dander     Augmentin [Amoxicillin-Pot Clavulanate] GI Disturbance    Azithromycin Nausea and Vomiting    Dust Mites     Pollen Extract     Smoke.        Problem List:    Patient Active Problem List    Diagnosis Date Noted    PICC (peripherally inserted central catheter) in place 02/07/2024     Priority: Medium    Urinary catheter in place 02/07/2024     Priority: Medium    Aspirin-like platelet function defect (H) 02/07/2024     Priority: Medium    Thrombocytopenia (H24) 02/07/2024     Priority: Medium    Adverse drug effect 02/07/2024     Priority: Medium    Medication induced coagulopathy  (H24) 02/06/2024     Priority: Medium    Anemia due to blood loss, acute 02/06/2024     Priority: Medium    Rectus sheath hematoma, initial encounter 02/05/2024     Priority: Medium    Acute deep vein thrombosis (DVT) of axillary vein of left upper extremity (H) 02/05/2024     Priority: Medium    Liver abscess 01/26/2024      Priority: Medium    Nausea with vomiting 11/03/2023     Priority: Medium    Metabolic acidosis, normal anion gap (NAG) 10/31/2023     Priority: Medium    Oropharyngeal dysphagia 10/31/2023     Priority: Medium    Elevated alkaline phosphatase level 10/31/2023     Priority: Medium    Bile salt-induced diarrhea 10/31/2023     Priority: Medium    Hypophosphatemia 10/30/2023     Priority: Medium    Hypoalbuminemia 10/30/2023     Priority: Medium    Anemia, unspecified type 10/30/2023     Priority: Medium    Elevated lactic acid level 10/30/2023     Priority: Medium    Abnormal chest CT-RLL opacity 10/30/2023     Priority: Medium    Abnormal abdominal CT scan-new extrahepatic 2 cm low attenuation area with fat stranding 10/30/2023     Priority: Medium    Inadequate oral nutritional intake 10/29/2023     Priority: Medium    Metastatic cancer to liver (H) 10/29/2023     Priority: Medium    History of bacteremia-Enterococcus Sept 2023 10/29/2023     Priority: Medium    S/P ERCP, sphincterotomy with stent placement, lap cholecystectomy 10/16-10/18/23 10/29/2023     Priority: Medium    Hypokalemia 10/28/2023     Priority: Medium    Gallstone pancreatitis 10/15/2023     Priority: Medium    Acute cholecystitis 10/07/2023     Priority: Medium    Hypothyroidism due to acquired atrophy of thyroid 03/09/2023     Priority: Medium    Malignant neoplasm metastatic to bone (H) 01/17/2023     Priority: Medium    Pancytopenia (H) 08/16/2022     Priority: Medium    Uncomplicated opioid dependence (H)      Priority: Medium    Chronic kidney disease, stage 3 (H) 06/15/2021     Priority: Medium    Failure to thrive (0-17) 06/02/2021     Priority: Medium     Replacing diagnoses that were inactivated after the 10/1/2021 regulatory import.      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     Priority: Medium    Hypomagnesemia 04/13/2021     Priority: Medium    Hiatal hernia 02/17/2020     Priority: Medium    Renal hematoma 10/28/2017      Priority: Medium    Retroperitoneal hematoma 10/28/2017     Priority: Medium    Syncope 10/18/2017     Priority: Medium    S/P ORIF (open reduction internal fixation) fracture 08/03/2017     Priority: Medium    Closed Colles' fracture of right radius with routine healing, subsequent encounter 08/03/2017     Priority: Medium    Status post insertion of drug-eluting stent into left anterior descending artery for coronary artery disease 01/05/2017     Priority: Medium    Chest pain 12/24/2016     Priority: Medium    Arthropathy 02/04/2014     Priority: Medium     Problem list name updated by automated process. Provider to review      Coronary atherosclerosis      Priority: Medium     Coronary artery disease  Problem list name updated by automated process. Provider to review      Hallux rigidus 07/16/2013     Priority: Medium    Inguinal hernia 06/28/2013     Priority: Medium    Degenerative arthritis of foot 06/28/2013     Priority: Medium    Hypertension goal BP (blood pressure) < 140/90 06/12/2012     Priority: Medium    Hyperlipidemia LDL goal <130 12/22/2011     Priority: Medium    Anxiety 11/02/2011     Priority: Medium    Allergic conjunctivitis 07/08/2008     Priority: Medium    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, insomnia type 11/17/2006     Priority: Medium     Problem list name updated by automated process. Provider to review      Acute reaction to stress 01/12/2005     Priority: Medium     Problem list name updated by automated process. Provider to review      Chronic maxillary sinusitis 01/12/2005     Priority: Medium    Contracture of palmar fascia 01/12/2005     Priority: Medium    Benign neoplasm of skin of other and unspecified parts of face 01/12/2005     Priority: Medium    Malignant neoplasm of prostate (H) 11/26/2004     Priority: Medium        Past Medical History:    Past Medical History:   Diagnosis Date    Allergic rhinitis     Allergy, unspecified not elsewhere classified      Antiplatelet or antithrombotic long-term use     Anxiety     Arthritis     Chest pain     Chronic sinusitis     Coronary atherosclerosis of unspecified type of vessel, native or graft     Depressive disorder 1995    Gastroesophageal reflux disease 2020    Head injury 1954    Hiatal hernia 2015    History of blood transfusion 12/15/2004    Hyperlipidemia     Hypertension     Inguinal hernia     Kidney problem 10/08/2017    Kidney stones     Malignant neoplasm of prostate (H)     Nausea with vomiting 11/03/2023    Prostate cancer (H)     Syncope     Thyroid disease     Tonsil cancer (H)        Past Surgical History:    Past Surgical History:   Procedure Laterality Date    ABDOMEN SURGERY      ARTHRODESIS FOOT  07/23/2013    Procedure: ARTHRODESIS FOOT;  Great Toe Arthrodesis Left Foot;  Surgeon: Ash Gonzalez DPM;  Location: [facility]    ARTHRODESIS FOOT  06/10/2014    Procedure: ARTHRODESIS FOOT;  Surgeon: Ash Gonzalez DPM;  Location: [facility]    BIOPSY  10/01/2004    dermatologist biopsies    COLONOSCOPY  10/07/2013    Procedure: COLONOSCOPY;  Colonoscopy;  Surgeon: Mike Fallon MD;  Location:  GI    CYSTOSCOPY N/A 02/16/2022    Procedure: CYSTOSCOPY and bladder stone removal;  Surgeon: David Rogers MD;  Location:  OR    ENDOSCOPIC RETROGRADE CHOLANGIOPANCREATOGRAM N/A 1/15/2024    Procedure: ENDOSCOPIC RETROGRADE CHOLANGIOPANCREATOGRAPHY, WITH stent removal;  Surgeon: Trent Blood MD;  Location:  OR    ENDOSCOPIC RETROGRADE CHOLANGIOPANCREATOGRAM COMPLEX N/A 10/16/2023    Procedure: ENDOSCOPIC RETROGRADE CHOLANGIOPANCREATOGRAPHY, COMPLEX;  Surgeon: Trent Blood MD;  Location:  OR    ENDOSCOPIC ULTRASOUND UPPER GASTROINTESTINAL TRACT (GI) N/A 10/16/2023    Procedure: Endoscopic ultrasound upper gastrointestinal tract (GI);  Surgeon: Trent Blood MD;  Location:  OR    ENT SURGERY      ESOPHAGOSCOPY, GASTROSCOPY, DUODENOSCOPY (EGD), COMBINED N/A 02/12/2020     Procedure: ESOPHAGOGASTRODUODENOSCOPY (EGD);  Surgeon: Sam Escobar MD;  Location: [facility] GI    EXTRACORPOREAL SHOCK WAVE LITHOTRIPSY (ESWL) Bilateral 10/18/2017    Procedure: EXTRACORPOREAL SHOCK WAVE LITHOTRIPSY (ESWL);  BILATERAL EXTRACORPOREAL SHOCKWAVE LITHOTRIPSY ;  Surgeon: Meir Torres MD;  Location: [facility] OR    HC CORRECT BUNION,SIMPLE  08/11/2005    x3    HC REMV TOE BENIGN BONE LESN  08/11/2005    HEAD & NECK SURGERY  1954    From a fall    HERNIORRHAPHY INGUINAL  07/03/2013    Procedure: HERNIORRHAPHY INGUINAL;  Open Repair Inguinal hernia Right with mesh ;  Surgeon: Sam Escobar MD;  Location: [facility]    IR GASTROSTOMY TUBE PERCUTANEOUS PLCMNT  06/07/2021    LAPAROSCOPIC CHOLECYSTECTOMY N/A 10/18/2023    Procedure: CHOLECYSTECTOMY, LAPAROSCOPIC;  Surgeon: Dannie Warner MD;  Location: [facility] OR    MOHS MICROGRAPHIC PROCEDURE  08/23/2011    ear and chin-[facility]    OPEN REDUCTION INTERNAL FIXATION WRIST Right 07/18/2017    Procedure: OPEN REDUCTION INTERNAL FIXATION WRIST;  Right distal radius open reduction and internal fixation;  Surgeon: Pedro Blanca DO;  Location: [facility]    RECONSTRUCT FOREFOOT WITH METATARSOPHALANGEAL (MTP) FUSION  06/10/2014    Procedure: RECONSTRUCT FOREFOOT WITH METATARSOPHALANGEAL (MTP) FUSION;  Surgeon: Ash Gonzalez DPM;  Location: [facility]    STENT, CORONARY, DEMI      SURGICAL HISTORY OF -   1999/1974    lt knee    SURGICAL HISTORY OF -   10/2004    lithotripsy    SURGICAL HISTORY OF -   11/2005    angiogram with stent    VASCULAR SURGERY  11/17/2005    Puncture of iliac artery during and angiogram    [facility] LAPAROSCOPY, SURGICAL PROSTATECTOMY, RETROPUBIC RADICAL, W/NERVE SPARING  11/30/2004    With full bilateral pelvic lymphadenectomy.  [facility].    [facility] TOTAL KNEE ARTHROPLASTY  05/01/2008    Left knee       Family History:    Family History   Problem Relation Age of Onset    Hypertension Father         Lived to age 87    Connective Tissue  "Disorder Mother         LUPUS    Heart Disease Mother         Valve replacement    Anxiety Disorder Mother         Lived to age 84    Dementia Mother         Nursing Home (lived to age 86)       Social History:  Marital Status:   [2]  Social History     Tobacco Use    Smoking status: Never    Smokeless tobacco: Never   Vaping Use    Vaping Use: Never used   Substance Use Topics    Alcohol use: Yes     Alcohol/week: 10.0 standard drinks of alcohol     Types: 10 Cans of beer per week     Comment: Moderate - 1 to two beers per day. ( None lately).    Drug use: No        Medications:    acetaminophen (TYLENOL) 325 MG tablet  apixaban ANTICOAGULANT (ELIQUIS) 5 MG tablet  budesonide (ENTOCORT EC) 3 MG EC capsule  camphor-menthol (DERMASARRA) 0.5-0.5 % external lotion  cetirizine (ZYRTEC) 10 MG tablet  enoxaparin ANTICOAGULANT (LOVENOX) 100 MG/ML syringe  famotidine (PEPCID) 20 MG tablet  fluconazole (DIFLUCAN) 200 MG tablet  hydrOXYzine HCl (ATARAX) 25 MG tablet  levothyroxine (SYNTHROID/LEVOTHROID) 100 MCG tablet  linezolid (ZYVOX) 600 MG tablet  LORazepam (ATIVAN) 0.5 MG tablet  melatonin 5 MG tablet  naloxone (NARCAN) 4 MG/0.1ML nasal spray  nitroGLYcerin (NITROSTAT) 0.4 MG sublingual tablet  ondansetron (ZOFRAN ODT) 4 MG ODT tab  oxyCODONE (ROXICODONE) 5 MG tablet  pregabalin (LYRICA) 150 MG capsule  rosuvastatin (CRESTOR) 40 MG tablet  zolpidem (AMBIEN) 10 MG tablet          Review of Systems  As mentioned above in the history present illness. All other systems were reviewed and are negative.    Physical Exam   BP: 90/74  Pulse: 100  Temp: 97.8  F (36.6  C)  Resp: 20  Height: 172.7 cm (5' 8\")  Weight: 80.3 kg (177 lb)  SpO2: 100 %      Physical Exam  Constitutional:       General: He is not in acute distress.     Appearance: Normal appearance. He is well-developed. He is not ill-appearing.   HENT:      Head: Normocephalic and atraumatic.      Right Ear: External ear normal.      Left Ear: External ear normal. "      Nose: Nose normal.      Mouth/Throat:      Mouth: Mucous membranes are moist.   Eyes:      Conjunctiva/sclera: Conjunctivae normal.   Cardiovascular:      Rate and Rhythm: Normal rate and regular rhythm.      Heart sounds: Normal heart sounds. No murmur heard.  Pulmonary:      Effort: Pulmonary effort is normal. No respiratory distress.      Breath sounds: Normal breath sounds.   Musculoskeletal:         General: Normal range of motion.   Skin:     General: Skin is warm and dry.      Findings: No rash.   Neurological:      General: No focal deficit present.      Mental Status: He is alert and oriented to person, place, and time.         ED Course        Procedures              Results for orders placed or performed during the hospital encounter of 03/12/24 (from the past 24 hour(s))   UA with Microscopic reflex to Culture    Specimen: Urine, NOS   Result Value Ref Range    Color Urine Straw Colorless, Straw, Light Yellow, Yellow    Appearance Urine Clear Clear    Glucose Urine Negative Negative mg/dL    Bilirubin Urine Negative Negative    Ketones Urine Negative Negative mg/dL    Specific Gravity Urine 1.006 1.003 - 1.035    Blood Urine Large (A) Negative    pH Urine 7.0 5.0 - 7.0    Protein Albumin Urine 30 (A) Negative mg/dL    Urobilinogen Urine Normal Normal, 2.0 mg/dL    Nitrite Urine Negative Negative    Leukocyte Esterase Urine Negative Negative    RBC Urine 14 (H) <=2 /HPF    WBC Urine 0 <=5 /HPF    Narrative    Urine Culture not indicated     *Note: Due to a large number of results and/or encounters for the requested time period, some results have not been displayed. A complete set of results can be found in Results Review.       Medications   sodium chloride 0.9 % infusion (0 mLs Intravenous Stopped 3/12/24 2120)   sodium chloride 0.9% BOLUS 1,000 mL (0 mLs Intravenous Stopped 3/12/24 2121)       Assessments & Plan (with Medical Decision Making)   73-year-old male with history of tonsillar cancer who  was sent here by his clinic provider for possible dehydration and needing some IV fluids.  He had labs done earlier today as noted above with elevated creatinine that has been trending upward.  Patient otherwise has no fevers.  UA obtained and shows no evidence of infection.  Patient denies abdominal pain or flank pain to suggest kidney stone.  Patient was given IV NS 1.5 L bolus. He felt much better.  UA is negative for infection. He does have 14 RBCs, large blood, and 30 protein.  Patient felt much better.  He will follow-up with his clinic provider and his oncologist.  He has routine labs done weekly, and will need to have his kidney function labs repeated.    Discharge Medication List as of 3/12/2024  9:21 PM          Final diagnoses:   Dehydration       3/12/2024   [facility] EMERGENCY DEPT       Gemini Pandya APRN CNP  03/12/24 [id number]

## 2024-03-14 NOTE — TELEPHONE ENCOUNTER
Patient's wife called RN CC. She is wondering if this can be sure to get filled before the weekend. She said they head down to the [place] on Monday and will be down there for 3 days. She said [facility] will be out of medication by Monday.     Will route high priority to Primary Care Provider.     Rossana Moore RN Care Coordination   [facility]  Email: [email or web address]  Phone: [phone]

## 2024-03-14 NOTE — PROGRESS NOTES
Clinic Care Coordination Contact  Care Team Conversations    Patient's wife left a voicemail asking for RN CC to call back.     RN CC called patient's wife back. She said the patient needs a refill on his Oxycodone. She said they have to go down to the [place] on Monday and they will be down there for 3 days. She said the patient will be out of medication on Monday.     See refill encounter from 3/14/2024. RN CC added note and sent to Primary Care Provider.     Rossana Moore RN Care Coordination   [facility]  Email: [email or web address]  Phone: [phone]

## 2024-03-15 NOTE — PROGRESS NOTES
Clinic Care Coordination Contact  Care Team Conversations    Patient's wife called and left message for RN CC. She said the [facility] just called the patient and said they wanted patient to get fluids on Friday. She said they need orders so patient can get fluids.     Upon chart review, patient is currently at the emergency room getting needed fluids. Will follow up with wife as scheduled.     Rossana Moore RN Care Coordination   [facility]  Email: [email or web address]  Phone: [phone]

## 2024-03-15 NOTE — DISCHARGE INSTRUCTIONS
Your creatinine remains unchanged at 1.85.  This level may not be related to dehydration.  You received another liter of normal saline through the IV today.  I would like you to try to increase your water intake to about 64 ounces a day.  Monitor your urine output and make sure that your urine is pale yellow and not any darker.  Have your kidney function rechecked again next week.  Discussed with your oncologist whether elevated creatinine may be related to the Keytruda and not from dehydration.  Turn to the emergency department at any time as needed.

## 2024-03-15 NOTE — ED TRIAGE NOTES
Patient states his creatinine is elevated and was instructed by [place] to get IV fluids. He does not have a therapy plan in place.      Triage Assessment (Adult)       Row Name 03/15/24 0842          Triage Assessment    Airway WDL WDL        Respiratory WDL    Respiratory WDL WDL        Skin Circulation/Temperature WDL    Skin Circulation/Temperature WDL WDL

## 2024-03-15 NOTE — ED PROVIDER NOTES
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  Patient is being followed by the [facility], and he has had 2 infusions of Keytruda.  He is expected to have another infusion next week.  They have been monitoring his creatinine and it was on the rise.  He was here 2 nights ago and had 2 L of normal saline.  He had a repeat creatinine level and he was told today that he should come in to get some additional fluids.  Patient is only drinking 4 glasses of water a day.  He is not having any nausea, vomiting or diarrhea.  He is making urine.  He always feels thirsty from previous radiation treatment to the throat from his first round of HPV throat cancer.    Problem List:  Patient Active Problem List    Diagnosis Date Noted    PICC (peripherally inserted central catheter) in place 02/07/2024     Priority: Medium    Urinary catheter in place 02/07/2024     Priority: Medium    Aspirin-like platelet function defect (H) 02/07/2024     Priority: Medium    Thrombocytopenia (H24) 02/07/2024     Priority: Medium    Adverse drug effect 02/07/2024     Priority: Medium    Medication induced coagulopathy  (H24) 02/06/2024     Priority: Medium    Anemia due to blood loss, acute 02/06/2024     Priority: Medium    Rectus sheath hematoma, initial encounter 02/05/2024     Priority: Medium    Acute deep vein thrombosis (DVT) of axillary vein of left upper extremity (H) 02/05/2024     Priority: Medium    Liver abscess 01/26/2024     Priority: Medium    Nausea with vomiting 11/03/2023     Priority: Medium    Metabolic  acidosis, normal anion gap (NAG) 10/31/2023     Priority: Medium    Oropharyngeal dysphagia 10/31/2023     Priority: Medium    Elevated alkaline phosphatase level 10/31/2023     Priority: Medium    Bile salt-induced diarrhea 10/31/2023     Priority: Medium    Hypophosphatemia 10/30/2023     Priority: Medium    Hypoalbuminemia 10/30/2023     Priority: Medium    Anemia, unspecified type 10/30/2023     Priority: Medium    Elevated lactic acid level 10/30/2023     Priority: Medium    Abnormal chest CT-RLL opacity 10/30/2023     Priority: Medium    Abnormal abdominal CT scan-new extrahepatic 2 cm low attenuation area with fat stranding 10/30/2023     Priority: Medium    Inadequate oral nutritional intake 10/29/2023     Priority: Medium    Metastatic cancer to liver (H) 10/29/2023     Priority: Medium    History of bacteremia-Enterococcus Sept 2023 10/29/2023     Priority: Medium    S/P ERCP, sphincterotomy with stent placement, lap cholecystectomy 10/16-10/18/23 10/29/2023     Priority: Medium    Hypokalemia 10/28/2023     Priority: Medium    Gallstone pancreatitis 10/15/2023     Priority: Medium    Acute cholecystitis 10/07/2023     Priority: Medium    Hypothyroidism due to acquired atrophy of thyroid 03/09/2023     Priority: Medium    Malignant neoplasm metastatic to bone (H) 01/17/2023     Priority: Medium    Pancytopenia (H) 08/16/2022     Priority: Medium    Uncomplicated opioid dependence (H)      Priority: Medium    Chronic kidney disease, stage 3 (H) 06/15/2021     Priority: Medium    Failure to thrive (0-17) 06/02/2021     Priority: Medium     Replacing diagnoses that were inactivated after the 10/1/2021 regulatory import.      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     Priority: Medium    Hypomagnesemia 04/13/2021     Priority: Medium    Hiatal hernia 02/17/2020     Priority: Medium    Renal hematoma 10/28/2017     Priority: Medium    Retroperitoneal hematoma 10/28/2017     Priority: Medium     Syncope 10/18/2017     Priority: Medium    S/P ORIF (open reduction internal fixation) fracture 08/03/2017     Priority: Medium    Closed Colles' fracture of right radius with routine healing, subsequent encounter 08/03/2017     Priority: Medium    Status post insertion of drug-eluting stent into left anterior descending artery for coronary artery disease 01/05/2017     Priority: Medium    Chest pain 12/24/2016     Priority: Medium    Arthropathy 02/04/2014     Priority: Medium     Problem list name updated by automated process. Provider to review      Coronary atherosclerosis      Priority: Medium     Coronary artery disease  Problem list name updated by automated process. Provider to review      Hallux rigidus 07/16/2013     Priority: Medium    Inguinal hernia 06/28/2013     Priority: Medium    Degenerative arthritis of foot 06/28/2013     Priority: Medium    Hypertension goal BP (blood pressure) < 140/90 06/12/2012     Priority: Medium    Hyperlipidemia LDL goal <130 12/22/2011     Priority: Medium    Anxiety 11/02/2011     Priority: Medium    Allergic conjunctivitis 07/08/2008     Priority: Medium    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, insomnia type 11/17/2006     Priority: Medium     Problem list name updated by automated process. Provider to review      Acute reaction to stress 01/12/2005     Priority: Medium     Problem list name updated by automated process. Provider to review      Chronic maxillary sinusitis 01/12/2005     Priority: Medium    Contracture of palmar fascia 01/12/2005     Priority: Medium    Benign neoplasm of skin of other and unspecified parts of face 01/12/2005     Priority: Medium    Malignant neoplasm of prostate (H) 11/26/2004     Priority: Medium       Past Medical History:   Diagnosis Date    Allergic rhinitis     Allergy, unspecified not elsewhere classified     Antiplatelet or antithrombotic long-term use     Anxiety     Arthritis     Chest pain     Chronic sinusitis      Coronary atherosclerosis of unspecified type of vessel, native or graft     Depressive disorder 1995    Gastroesophageal reflux disease 2020    Head injury 1954    Hiatal hernia 2015    History of blood transfusion 12/15/2004    Hyperlipidemia     Hypertension     Inguinal hernia     Kidney problem 10/08/2017    Kidney stones     Malignant neoplasm of prostate (H)     Nausea with vomiting 11/03/2023    Prostate cancer (H)     Syncope     Thyroid disease     Tonsil cancer (H)        MEDS: acetaminophen (TYLENOL) 325 MG tablet  apixaban ANTICOAGULANT (ELIQUIS) 5 MG tablet  budesonide (ENTOCORT EC) 3 MG EC capsule  camphor-menthol (DERMASARRA) 0.5-0.5 % external lotion  cetirizine (ZYRTEC) 10 MG tablet  enoxaparin ANTICOAGULANT (LOVENOX) 100 MG/ML syringe  famotidine (PEPCID) 20 MG tablet  fluconazole (DIFLUCAN) 200 MG tablet  hydrOXYzine HCl (ATARAX) 25 MG tablet  levothyroxine (SYNTHROID/LEVOTHROID) 100 MCG tablet  linezolid (ZYVOX) 600 MG tablet  LORazepam (ATIVAN) 0.5 MG tablet  melatonin 5 MG tablet  naloxone (NARCAN) 4 MG/0.1ML nasal spray  nitroGLYcerin (NITROSTAT) 0.4 MG sublingual tablet  ondansetron (ZOFRAN ODT) 4 MG ODT tab  oxyCODONE (ROXICODONE) 5 MG tablet  pregabalin (LYRICA) 150 MG capsule  rosuvastatin (CRESTOR) 40 MG tablet  zolpidem (AMBIEN) 10 MG tablet        ALLERGIES:    Allergies   Allergen Reactions    Levofloxacin Rash     Head to toe    Animal Dander     Augmentin [Amoxicillin-Pot Clavulanate] GI Disturbance    Azithromycin Nausea and Vomiting    Dust Mites     Pollen Extract     Smoke.        Past Surgical History:   Procedure Laterality Date    ABDOMEN SURGERY      ARTHRODESIS FOOT  07/23/2013    Procedure: ARTHRODESIS FOOT;  Great Toe Arthrodesis Left Foot;  Surgeon: Ash Gonzalez DPM;  Location: [facility]    ARTHRODESIS FOOT  06/10/2014    Procedure: ARTHRODESIS FOOT;  Surgeon: Ash Gonzalez DPM;  Location: [facility]    BIOPSY  10/01/2004    dermatologist biopsies    COLONOSCOPY   10/07/2013    Procedure: COLONOSCOPY;  Colonoscopy;  Surgeon: Mike Fallon MD;  Location:  GI    CYSTOSCOPY N/A 02/16/2022    Procedure: CYSTOSCOPY and bladder stone removal;  Surgeon: David Rogers MD;  Location:  OR    ENDOSCOPIC RETROGRADE CHOLANGIOPANCREATOGRAM N/A 1/15/2024    Procedure: ENDOSCOPIC RETROGRADE CHOLANGIOPANCREATOGRAPHY, WITH stent removal;  Surgeon: Trent Blood MD;  Location:  OR    ENDOSCOPIC RETROGRADE CHOLANGIOPANCREATOGRAM COMPLEX N/A 10/16/2023    Procedure: ENDOSCOPIC RETROGRADE CHOLANGIOPANCREATOGRAPHY, COMPLEX;  Surgeon: Trent Blood MD;  Location:  OR    ENDOSCOPIC ULTRASOUND UPPER GASTROINTESTINAL TRACT (GI) N/A 10/16/2023    Procedure: Endoscopic ultrasound upper gastrointestinal tract (GI);  Surgeon: Trent Blood MD;  Location:  OR    ENT SURGERY      ESOPHAGOSCOPY, GASTROSCOPY, DUODENOSCOPY (EGD), COMBINED N/A 02/12/2020    Procedure: ESOPHAGOGASTRODUODENOSCOPY (EGD);  Surgeon: Sam Escobar MD;  Location:  GI    EXTRACORPOREAL SHOCK WAVE LITHOTRIPSY (ESWL) Bilateral 10/18/2017    Procedure: EXTRACORPOREAL SHOCK WAVE LITHOTRIPSY (ESWL);  BILATERAL EXTRACORPOREAL SHOCKWAVE LITHOTRIPSY ;  Surgeon: Meir Torres MD;  Location:  OR     CORRECT BUNION,SIMPLE  08/11/2005    x3    HC REMV TOE BENIGN BONE LESN  08/11/2005    HEAD & NECK SURGERY  1954    From a fall    HERNIORRHAPHY INGUINAL  07/03/2013    Procedure: HERNIORRHAPHY INGUINAL;  Open Repair Inguinal hernia Right with mesh ;  Surgeon: Sam Escobar MD;  Location:  OR    IR GASTROSTOMY TUBE PERCUTANEOUS PLCMNT  06/07/2021    LAPAROSCOPIC CHOLECYSTECTOMY N/A 10/18/2023    Procedure: CHOLECYSTECTOMY, LAPAROSCOPIC;  Surgeon: Dannie Warner MD;  Location:  OR    MOHS MICROGRAPHIC PROCEDURE  08/23/2011    ear and chin-[facility]    OPEN REDUCTION INTERNAL FIXATION WRIST Right 07/18/2017    Procedure: OPEN REDUCTION INTERNAL FIXATION WRIST;  Right  "distal radius open reduction and internal fixation;  Surgeon: Pedro Blanca DO;  Location: [facility]    RECONSTRUCT FOREFOOT WITH METATARSOPHALANGEAL (MTP) FUSION  06/10/2014    Procedure: RECONSTRUCT FOREFOOT WITH METATARSOPHALANGEAL (MTP) FUSION;  Surgeon: Ash Gonzalez DPM;  Location: [facility] OR    STENT, CORONARY, DEMI      SURGICAL HISTORY OF -   1999/1974    lt knee    SURGICAL HISTORY OF -   10/2004    lithotripsy    SURGICAL HISTORY OF -   11/2005    angiogram with stent    VASCULAR SURGERY  11/17/2005    Puncture of iliac artery during and angiogram    [facility] LAPAROSCOPY, SURGICAL PROSTATECTOMY, RETROPUBIC RADICAL, W/NERVE SPARING  11/30/2004    With full bilateral pelvic lymphadenectomy.  -[facility].    [facility] TOTAL KNEE ARTHROPLASTY  05/01/2008    Left knee       Social History     Tobacco Use    Smoking status: Never    Smokeless tobacco: Never   Vaping Use    Vaping Use: Never used   Substance Use Topics    Alcohol use: Yes     Alcohol/week: 10.0 standard drinks of alcohol     Types: 10 Cans of beer per week     Comment: Moderate - 1 to two beers per day. ( None lately).    Drug use: No         Review of Systems   Except as noted in HPI, all other systems were reviewed and are negative    Physical Exam   Vitals were reviewed  Patient Vitals for the past 12 hrs:   BP Temp Temp src Pulse Resp SpO2 Height Weight   03/15/24 1138 110/65 -- -- 81 20 98 % -- --   03/15/24 0842 103/74 97.5  F (36.4  C) Temporal 101 20 98 % 1.727 m (5' 8\") 80.3 kg (177 lb)     GENERAL APPEARANCE: Alert and oriented x 3, no acute distress  FACE: normal facies  NECK: no adenopathy or asymmetry  RESP: normal respiratory effort; clear breath sounds bilaterally  CV: regular rate and rhythm; no significant murmurs, gallops or rubs  ABD: soft, no tenderness; no rebound or guarding; bowel sounds are normal  EXT: No calf tenderness or pitting edema  SKIN: no worrisome rash  NEURO: no facial droop; no focal deficits, speech is " normal        Available Lab/Imaging Results     Results for orders placed or performed during the hospital encounter of 03/15/24 (from the past 24 hour(s))   CBC with platelets differential    Narrative    The following orders were created for panel order CBC with platelets differential.  Procedure                               Abnormality         Status                     ---------                               -----------         ------                     CBC with platelets and d...[[id number]]  Abnormal            Final result                 Please view results for these tests on the individual orders.   Basic metabolic panel   Result Value Ref Range    Sodium 137 135 - 145 mmol/L    Potassium 3.4 3.4 - 5.3 mmol/L    Chloride 112 (H) 98 - 107 mmol/L    Carbon Dioxide (CO2) 14 (L) 22 - 29 mmol/L    Anion Gap 11 7 - 15 mmol/L    Urea Nitrogen 30.8 (H) 8.0 - 23.0 mg/dL    Creatinine 1.85 (H) 0.67 - 1.17 mg/dL    GFR Estimate 38 (L) >60 mL/min/1.73m2    Calcium 9.5 8.8 - 10.2 mg/dL    Glucose 90 70 - 99 mg/dL   CBC with platelets and differential   Result Value Ref Range    WBC Count 4.3 4.0 - 11.0 10e3/uL    RBC Count 3.55 (L) 4.40 - 5.90 10e6/uL    Hemoglobin 10.2 (L) 13.3 - 17.7 g/dL    Hematocrit 31.0 (L) 40.0 - 53.0 %    MCV 87 78 - 100 fL    MCH 28.7 26.5 - 33.0 pg    MCHC 32.9 31.5 - 36.5 g/dL    RDW 18.2 (H) 10.0 - 15.0 %    Platelet Count 108 (L) 150 - 450 10e3/uL    % Neutrophils 60 %    % Lymphocytes 20 %    % Monocytes 19 %    % Eosinophils 0 %    % Basophils 0 %    % Immature Granulocytes 0 %    NRBCs per 100 WBC 0 <1 /100    Absolute Neutrophils 2.6 1.6 - 8.3 10e3/uL    Absolute Lymphocytes 0.9 0.8 - 5.3 10e3/uL    Absolute Monocytes 0.8 0.0 - 1.3 10e3/uL    Absolute Eosinophils 0.0 0.0 - 0.7 10e3/uL    Absolute Basophils 0.0 0.0 - 0.2 10e3/uL    Absolute Immature Granulocytes 0.0 <=0.4 10e3/uL    Absolute NRBCs 0.0 10e3/uL     *Note: Due to a large number of results and/or encounters for the  requested time period, some results have not been displayed. A complete set of results can be found in Results Review.              Impression     Final diagnoses:   Elevated serum creatinine         ED Course & Medical Decision Making   Quan Murphy is a 73 year old male who presented to the emergency department with obstructions from his oncology clinic to come in for IV fluids due to concern for dehydration as a cause for his elevated creatinine level.  Patient started Keytruda and has had 2 infusions.  He has metastatic HPV infection that has recurred.  He had his initial HPV throat cancer several years ago, and underwent radiation and chemotherapy.  Patient is currently on Keytruda and has had 2 infusions.    Vital signs reveal a temp of 97.5, blood pressure 103/74, heart rate of 101, respiratory rate of 20 with 98% oxygen saturation.  Exam reveals clear breath sounds, normal heart sounds, no abdominal tenderness.  Lower extremities are unremarkable.  Laboratory workup reveals white blood count of 4.3, hemoglobin of 10.2, basic metabolic panel reveals a creatinine of 1.85, with GFR of 39.  Patient's creatinine levels have been 1.86 yesterday, 2.433 days ago, and 2.62 a week ago.  Patient does not have any symptoms of nausea, vomiting or diarrhea.  He can continue to orally rehydrate.  We discussed his current oral intake of about 4 glasses of water a day.  This needs to increase to about 64 ounces.  He has no history of CHF or any contraindications to drinking more fluids.  I suspect that his baseline creatinine levels may be due to side effects of the Keytruda; this is not likely due to dehydration as he is making urine and has no signs of significant dehydration.  Continue close monitoring and check urine to make sure he does not have acute nephritis.        Written after-visit summary and instructions were given at the time of discharge.    Follow up Plan:   [facility] Emergency Dept  [address] [place] [id number] [phone]    As needed      Discharge Instructions:   Your creatinine remains unchanged at 1.85.  This level may not be related to dehydration.  You received another liter of normal saline through the IV today.  I would like you to try to increase your water intake to about 64 ounces a day.  Monitor your urine output and make sure that your urine is pale yellow and not any darker.  Have your kidney function rechecked again next week.  Discussed with your oncologist whether elevated creatinine may be related to the Keytruda and not from dehydration.  Turn to the emergency department at any time as needed.       Disclaimer: This note consists of words and symbols derived from keyboarding and dictation using voice recognition software.  As a result, there may be errors that have gone undetected.  Please consider this when interpreting information found in this note.       Rex Glez MD  03/15/24 8011     no

## 2024-03-18 NOTE — PROGRESS NOTES
Clinic Care Coordination Contact    Situation: Patient chart reviewed by care coordinator.    Background: Patient is actively enrolled in care coordination.     Assessment: Patient was seen in the emergency room over the weekend for fluids. Patient is scheduled to be down [facility] the [facility] the next 3 days.     Plan/Recommendations: RN CC will reach out at next scheduled outreach.     Rossana Moore RN Care Coordination   [facility]  Email: [email or web address]  Phone: [phone]

## 2024-03-29 NOTE — PROGRESS NOTES
Clinic Care Coordination Contact  [facility]/Voicemail    Clinical Data: Care Coordinator Outreach    Outreach Documentation Number of Outreach Attempt   3/29/2024   3:17 PM 1       Left message on  wife Alessandra's  voicemail with call back information and requested return call.    Plan: Care Coordinator sent care coordination introduction letter on 2/1/2024 via [employer]. Care Coordinator will try to reach patient again in 10 business days.    Rossana Moore RN Care Coordination   [facility] [place]  Email: [email or web address]  Phone: [phone]

## 2024-04-04 NOTE — TELEPHONE ENCOUNTER
I have sent a prescription for 3 days of prednisone to his pharmacy.  He should pick this up and start tonight.  He should continue the hydroxyzine or Benadryl on an as-needed basis.      Christine

## 2024-04-04 NOTE — TELEPHONE ENCOUNTER
Patient had a CT scan with contrast at 2:15 pm yesterday.  The itching and redness started this morning at 8:30 am.  No problems breathing.  No additional problems swallowing.  No hives.    He is red from head to toe.  He has ongoing peeling of his skin that has not increased.    He has been taking hydroxyzine and this is helping the itching.    He has had some swelling in his ankle that comes and goes.    He has turned red when he was on vancomycin before.  He has been sleeping today due to not being able to sleep last night.    She has not checked his blood pressure.    Please advise if there is anything else they should do other than monitor it.    Anita Damon RN on 4/4/2024 at 3:38 PM

## 2024-04-04 NOTE — TELEPHONE ENCOUNTER
I called and talked with Cheri ( wife, C2C on file).  I asked her if they saw the ct scan result on his My-chart, and that if they had any question regarding those results to call and take with the ordering provider.    Cheri stated he is bright red and itching since the Ct. Scan.  I informed her I would have a nurse call and talk to them about his symptoms.      C2C on on file, 6-.

## 2024-04-04 NOTE — TELEPHONE ENCOUNTER
----- Message from Monico Rivesr MD sent at 4/4/2024  7:59 AM CDT -----  Team - please call patient with results to make sure results available for his ordering oncologist.

## 2024-04-09 NOTE — PROGRESS NOTES
Clinic Care Coordination Contact  [facility]/Voicemail    Clinical Data: Care Coordinator Outreach    Outreach Documentation Number of Outreach Attempt   3/29/2024   3:17 PM 1   4/9/2024  12:16 PM 1       Left message on  wife Alessandra's  voicemail with call back information and requested return call.    Plan: Care Coordinator sent care coordination introduction letter on 2/1/2024 via [employer]. Care Coordinator will try to reach patient again in 1-2 business days.    Rossana Moore, RN Care Coordination   [facility] [place]  Email: [email or web address]  Phone: [phone]

## 2024-04-11 NOTE — PROGRESS NOTES
Clinic Care Coordination Contact  Follow Up Progress Note      Assessment: RN CC received a voicemail from patient's wife. She said the [facility] wanted patient's CT images and only got the report. She asked for a call back.     RN CC called [facility] department and had the CT imagines pushed to the [facility].     RN CC called and spoke to patient's wife. Advised the images were pushed. Advised the [facility] should get the images now. Patient's wife thanked RN CC. She said it is really frustrating how much calling they need to do to get things done. She again thanked RN CC. She will call with any further needs. She said the patient is doing well right now and they do not need anything right now.     Care Gaps:    Health Maintenance Due   Topic Date Due    HEPATITIS A IMMUNIZATION (1 of 2 - Risk 2-dose series) Never done    MEDICARE ANNUAL WELLNESS VISIT  07/15/2021    LIPID  04/25/2024       Postponed to next Primary Care Provider visit     Care Plans  Care Plan: Health Maintenance       Problem: Health Maintenance Due or Overdue       Goal: Become up-to-date with health maintenance visit(s)       Start Date: 3/13/2024 Expected End Date: 9/9/2024    This Visit's Progress: 0%    Note:     Barriers: declining health, cancer diagnosis  Strengths: engaged and motivated  Patient expressed understanding of goal: yes  Action steps to achieve this goal:  1. I will ask to update HM at appointments  2. I will scheduled appts for important HM items  3. I will work with the CH[facility] to complete my overdue HM   4. I will call RN CC with any questions or concerns                            Care Plan: Health Care System       Problem: Navigating health care system       Goal: Assist patient and caregiver with navigating the health care system       Start Date: 3/13/2024 Expected End Date: 9/9/2024    This Visit's Progress: 0%    Note:     Barriers: declining health, cancer diagnosis  Strengths: motivated and engaged  Patient expressed  understanding of goal: yes  Action steps to achieve this goal:  1. I will call and make primary care appointments at [phone]  2. I will call and make specialty appointments at [phone]  3. I will contact RNCC with any questions or concerns about care or appointments.                                  Intervention/Education provided during outreach: As above. CC role, goal(s), clinic after hours, appointments, discussed/reviewed. Support provided.     Outreach Frequency: 2 weeks, more frequently as needed    Plan:   Patient will continue to take all medications as prescribed.   Patient will follow up with the [facility] as scheduled.   Wife will call with any needs.   Patient/caregiver will call RNCC with questions, concerns, support needs. RNCC will be available as needed.     Care Coordinator will follow up in 2 weeks.     Rossana Moore RN Care Coordination   [facility]  Email: [email or web address]  Phone: [phone]

## 2024-04-11 NOTE — TELEPHONE ENCOUNTER
Pt requesting to be restarted on their prescription of duloxetine. Was discontinued due to a drug interaction, but the other medication course is finished and the patient would like to start taking duloxetine again. Please send rx, or reach out to patient if any questions.     Thank you,  Rubens Lares Pharm.D.  [facility]  [phone]

## 2024-04-12 NOTE — PROGRESS NOTES
Palliative Care Outpatient Clinic      Patient ID:  Medical - He has SCC L tonsil dx 3/2021 [id number] p16+  Definitive chemoradiotherapy 4-6/2021 6/2/2021 admitted with FTT/urgent need for GT placement and had a respiratory arrest 2 d later inpatient thought to be drug-induced. Got PEG and was discharged.  6/12/2021 radiation end date.  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  He had much dysphoria with methadone in 2021 with his chemoradiation  On ambien and alprazolam for insomnia/anxiety.     Social - Lives with wife. 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   +HCD on chart; names Cheri Carroll his wife as his primary agent.      Care Planning - discussed prognosis and tx goals 9/2022 palliative visit.     Opioid Safety - +naloxone  See 5/14/2021 opioid risk/safety discussion; I consider him higher risk due to long history of higher risk polysubstance use (opioids, benzos, alcohol), hx of inadvertent overdose (hospital 6/2021), self-titration of meds, anxiety.       History:  History gathered today from: patient, medical chart, outside records including Care Everywhere (reviewed [facility] records extensively)    It's been a couple years since I saw him.  His cancer care is now at [facility] and I reviewed major events as summarized  above.   He's not doing well  Lost 30# since Jan. Around that time he was clearly developing widespread mets mckenzie liver, bone.  His qol has been poor in general he notes.  He's been on pembro since Feb; scan Apr showed progresion; his [facility] oncologist is suggesting adding chemo again and he's not sure he wants to do that.    Dr French has been discussing care goals with him and has suggested hospice enrollment. New thinks he might enroll; he knows it means stopping anticancer tx.  We discussed this in detail. He asks what to expect; d/w him he'd probably die of liver failure from his tumor.    Pain control  He misses hydrocodone; he was on it long term and really felt it was helpful.  I assume he's off it due to liver concerns with the APAP and we discussed that.      Oxycodone 5 mg tabs #240 4/2; #140 3/17, 3/1, 2/13.   Pregabalin 150 mg bid  Ambien  Ativan    PE: There were no vitals taken for this visit.   Wt Readings from Last 3 Encounters:   03/15/24 80.3 kg (177 lb)   03/12/24 80.3 kg (177 lb)   03/05/24 83.5 kg (184 lb)     Alert NAD  Cachectic, chronically ill appearing      Data reviewed:  I reviewed recent labs and imaging, my comments:  May labs 4/9 Cr 1.59  AlkP 240  Liver enzymes ow wnl  Hgb 9.8  Plt 151      OUtside PET Feb  FINDINGS:   New superficial FDG avidity involving the mucosal of the oral cavity and pharynx as well as increased FDG avidity of the esophagus.     The previous dominant hepatic lesion representing abscess is now markedly decreased related to interval therapy. However, the overall degree of FDG avid hepatic metastatic disease has increased, with multiple new metastases involving both hepatic lobes   and interval increase in size of previously seen metastases.     Interval progression of FDG avid osseous metastatic disease. Sites of osseous metastasis include but are not limited to the sternum, the posterior right 7th rib, thoracic and lumbar vertebral bodies, the superior right  ilium, the posterior right ilium,   the right ischium, and the right femoral neck.     Focal uptake in a portacaval lymph node is suspicious for metastasis. New uptake in bilateral axillary lymph nodes is indeterminate and may be reactive. Likewise, multiple distal external iliac and inguinal nodes may be reactive.     Focal uptake in multiple healing left-sided rib fractures which are new since comparison. Scattered musculoskeletal activity elsewhere with a degenerative appearance.     Significant incidental findings on the low-dose unenhanced CT fusion images: Scattered vascular calcification including coronary artery calcification. Cholecystectomy. Nonobstructing bilateral calyceal tip urinary calculi. Colonic diverticula. Anterior   abdominal wall injection sites. Fat-containing left inguinal hernia. Right extrarenal iliac stent. Musculoskeletal degenerative changes.       CT 4/3  IMPRESSION:  1. Worsening metastatic disease through the liver. Retraction at the  ablation site along the right hepatic lobe is unchanged. Adjacent soft  tissue has improved.  2. Multiple small nonobstructing stones in both kidneys.  3. Left rectus sheath hematoma has nearly resolved. There is a small  bilobed fluid collection posterior to the rectus sheath musculature in  the pelvis that may be sequela of the previous hematoma. Infection  within this collection cannot be excluded.      [facility] database reviewed: y      Impression & Recommendations:  74 yo with recurrent and now widely metastatic tonsilar SCC which appears to be progressing on pembrolizumab; clinical decline    I hadn't seen New in nearly a couple years and I think he wanted to run his situation by me and see if I had ideas and what I thought about hospice care for him. Dr Rivers has been discussing care goals with New and New is inclined he tells me today to start hospice; he understands this means discontinuing anticancer tx entirely. I told New he certainly  qualifies for hospice if he wants it and it is reasonable to enroll if that's what's most compatible with his preferences which it sounds like it is. I am happy to be his hospice attending if he wishes and would be happy to see him via video while he's on hospice if he has the energy/interest in that; but he wouldn't need to and usually the hospice team itself comprehensively takes over someone's care and we discussed that.     Discussed [place] hydrocodone (he really likes HC)--I think it will be prohibitively expensive though with his insurance.  We could try MSContin--he'll think about that. Given the totality of his situation it seems like it's time to prioritize symptom relief and comfort for New.  I d/w him some basics about what dying from tumor related liver failure is usually like. D/w him I don't know the time frame, could be a month or several months; unlikely to be a year or anything like that.    Answered his Qs  He says he'll get back in contact with me if he wants.  Follow-up as needed/anytime.      Over 60 minutes spent on the date of the encounter doing chart review, history and exam, patient education & counseling, documentation and other activities as noted above.    Thank you for involving us in the patient's care.   Elier Serrano MD / Palliative Medicine / Text me via [employer]  This note may have been composed with voice recognition software and there may be mistranscriptions.      Video visit; start ~245 end ~310, he is at home, I am on site.

## 2024-04-12 NOTE — LETTER
4/12/2024       RE: Quan Murphy  [address]  [place] [facility] [address]     Dear Colleague,    Thank you for referring your patient, [facility]an Murphy[facility]. Please see a copy of my visit note below.    Palliative Care Outpatient Clinic      Patient ID:  Medical - He has SCC L tonsil dx 3/2021 [id number] p16+  Definitive chemoradiotherapy 4-6/2021 6/2/2021 admitted with FTT/urgent need for GT placement and had a respiratory arrest 2 d later inpatient thought to be drug-induced. Got PEG and was discharged.  6/12/2021 radiation end date.  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  He had much dysphoria with methadone in 2021 with his chemoradiation  On ambien and alprazolam for insomnia/anxiety.     Social - Lives with wife. 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   +HCD on chart; names Cheri Carroll his wife as his primary agent.      Care Planning - discussed prognosis and tx goals 9/2022 palliative visit.     Opioid Safety - +naloxone  See 5/14/2021 opioid risk/safety discussion; I consider him higher risk due to long history of higher risk polysubstance use (opioids, benzos, alcohol), hx of inadvertent overdose (hospital 6/2021),  self-titration of meds, anxiety.       History:  History gathered today from: patient, medical chart, outside records including Care Everywhere (reviewed [facility] records extensively)    It's been a couple years since I saw him.  His cancer care is now at [facility] and I reviewed major events as summarized above.   He's not doing well  Lost 30# since Jan. Around that time he was clearly developing widespread mets mckenzie liver, bone.  His qol has been poor in general he notes.  He's been on pembro since Feb; scan Apr showed progresion; his [facility] oncologist is suggesting adding chemo again and he's not sure he wants to do that.    Dr French has been discussing care goals with him and has suggested hospice enrollment. New thinks he might enroll; he knows it means stopping anticancer tx.  We discussed this in detail. He asks what to expect; d/w him he'd probably die of liver failure from his tumor.    Pain control  He misses hydrocodone; he was on it long term and really felt it was helpful.  I assume he's off it due to liver concerns with the APAP and we discussed that.      Oxycodone 5 mg tabs #240 4/2; #140 3/17, 3/1, 2/13.   Pregabalin 150 mg bid  Ambien  Ativan    PE: There were no vitals taken for this visit.   Wt Readings from Last 3 Encounters:   03/15/24 80.3 kg (177 lb)   03/12/24 80.3 kg (177 lb)   03/05/24 83.5 kg (184 lb)     Alert NAD  Cachectic, chronically ill appearing      Data reviewed:  I reviewed recent labs and imaging, my comments:  May labs 4/9 Cr 1.59  AlkP 240  Liver enzymes ow wnl  Hgb 9.8  Plt 151      OUtside PET Feb  FINDINGS:   New superficial FDG avidity involving the mucosal of the oral cavity and pharynx as well as increased FDG avidity of the esophagus.     The previous dominant hepatic lesion representing abscess is now markedly decreased related to interval therapy. However, the overall degree of FDG avid hepatic metastatic disease has increased, with multiple new metastases involving both  hepatic lobes   and interval increase in size of previously seen metastases.     Interval progression of FDG avid osseous metastatic disease. Sites of osseous metastasis include but are not limited to the sternum, the posterior right 7th rib, thoracic and lumbar vertebral bodies, the superior right ilium, the posterior right ilium,   the right ischium, and the right femoral neck.     Focal uptake in a portacaval lymph node is suspicious for metastasis. New uptake in bilateral axillary lymph nodes is indeterminate and may be reactive. Likewise, multiple distal external iliac and inguinal nodes may be reactive.     Focal uptake in multiple healing left-sided rib fractures which are new since comparison. Scattered musculoskeletal activity elsewhere with a degenerative appearance.     Significant incidental findings on the low-dose unenhanced CT fusion images: Scattered vascular calcification including coronary artery calcification. Cholecystectomy. Nonobstructing bilateral calyceal tip urinary calculi. Colonic diverticula. Anterior   abdominal wall injection sites. Fat-containing left inguinal hernia. Right extrarenal iliac stent. Musculoskeletal degenerative changes.       CT 4/3  IMPRESSION:  1. Worsening metastatic disease through the liver. Retraction at the  ablation site along the right hepatic lobe is unchanged. Adjacent soft  tissue has improved.  2. Multiple small nonobstructing stones in both kidneys.  3. Left rectus sheath hematoma has nearly resolved. There is a small  bilobed fluid collection posterior to the rectus sheath musculature in  the pelvis that may be sequela of the previous hematoma. Infection  within this collection cannot be excluded.      [facility] database reviewed: y      Impression & Recommendations:  72 yo with recurrent and now widely metastatic tonsilar SCC which appears to be progressing on pembrolizumab; clinical decline    I hadn't seen New in nearly a couple years and I think he wanted  to run his situation by me and see if I had ideas and what I thought about hospice care for him. Dr Rivers has been discussing care goals with New and New is inclined he tells me today to start hospice; he understands this means discontinuing anticancer tx entirely. I told New he certainly qualifies for hospice if he wants it and it is reasonable to enroll if that's what's most compatible with his preferences which it sounds like it is. I am happy to be his hospice attending if he wishes and would be happy to see him via video while he's on hospice if he has the energy/interest in that; but he wouldn't need to and usually the hospice team itself comprehensively takes over someone's care and we discussed that.     Discussed [place] hydrocodone (he really likes HC)--I think it will be prohibitively expensive though with his insurance.  We could try MSContin--he'll think about that. Given the totality of his situation it seems like it's time to prioritize symptom relief and comfort for New.  I d/w him some basics about what dying from tumor related liver failure is usually like. D/w him I don't know the time frame, could be a month or several months; unlikely to be a year or anything like that.    Answered his Qs  He says he'll get back in contact with me if he wants.  Follow-up as needed/anytime.      Over 60 minutes spent on the date of the encounter doing chart review, history and exam, patient education & counseling, documentation and other activities as noted above.    Thank you for involving us in the patient's care.   Elier Serrano MD / Palliative Medicine / Text me via [employer]  This note may have been composed with voice recognition software and there may be mistranscriptions.      Video visit; start ~245 end ~310, he is at home, I am on site.        Again, thank you for allowing me to participate in the care of your patient.      Sincerely,    Elier Serrano MD

## 2024-04-25 NOTE — PROGRESS NOTES
Clinic Care Coordination Contact  Follow Up Progress Note      Assessment: RN CC called and spoke to patient's wife. Patient is due for a check in call. Alessandra said things are going OK right now. She said the patient enrolled into hospice last week. She said things have been going OK with the hospice team. She has no needs at this time.    Care Gaps:    Health Maintenance Due   Topic Date Due    HEPATITIS A IMMUNIZATION (1 of 2 - Risk 2-dose series) Never done    MEDICARE ANNUAL WELLNESS VISIT  07/15/2021    COVID-19 Vaccine (6 - 2023-24 season) 01/23/2024    LIPID  04/25/2024       Postponed to next Primary Care Provider visit     Care Plans      Intervention/Education provided during outreach: As above. CC role, goal(s), clinic after hours, appointments, discussed/reviewed. Support provided.    Plan:   RN CC will disenroll patient from Primary Care Coordination as he is now enrolled with hospice. Wife will reach out to the clinic with any further needs or concerns.     Rossana Moore RN Care Coordination   [facility]  Email: [email or web address]  Phone: [phone]

## 2024-04-26 NOTE — ED NOTES
Pt presents for fluids due to weakness. Pt's wife states that he is on hospice and has been feeling really dehydrated with increased weakness and requested to come to ED.

## 2024-04-26 NOTE — ED PROVIDER NOTES
History     Chief Complaint   Patient presents with    IV Fluids       HPI  Quan Murphy is a 73 year old male who presents to the emergency department with his wife requesting IV fluids.  The patient was placed on hospice on 4/14.  He has metastatic oropharyngeal cancer.  Wife notes that he is no longer eating but does sip on fluids at times.  He is swallowing medications okay.  He started requesting to come to the hospital for 2 bags of IV fluids today.  Wife notes he is very stubborn and just getting him out of the house to get here has worn him out.  His wife Alessandra states that their goal is comfort cares for him at this time and he is receiving oral Dilaudid, benzodiazepines, and Haldol at home.  Here today patient rates his pain as a 9/10.        Allergies:  Allergies   Allergen Reactions    Levofloxacin Rash     Head to toe    Animal Dander     Augmentin [Amoxicillin-Pot Clavulanate] GI Disturbance    Azithromycin Nausea and Vomiting    Dust Mites     Pollen Extract     Smoke.        Problem List:    Patient Active Problem List    Diagnosis Date Noted    PICC (peripherally inserted central catheter) in place 02/07/2024     Priority: Medium    Urinary catheter in place 02/07/2024     Priority: Medium    Aspirin-like platelet function defect (H) 02/07/2024     Priority: Medium    Thrombocytopenia (H24) 02/07/2024     Priority: Medium    Adverse drug effect 02/07/2024     Priority: Medium    Medication induced coagulopathy  (H24) 02/06/2024     Priority: Medium    Anemia due to blood loss, acute 02/06/2024     Priority: Medium    Rectus sheath hematoma, initial encounter 02/05/2024     Priority: Medium    Acute deep vein thrombosis (DVT) of axillary vein of left upper extremity (H) 02/05/2024     Priority: Medium    Liver abscess 01/26/2024     Priority: Medium    Nausea with vomiting 11/03/2023     Priority: Medium    Metabolic acidosis, normal anion gap (NAG) 10/31/2023     Priority: Medium    Oropharyngeal  dysphagia 10/31/2023     Priority: Medium    Elevated alkaline phosphatase level 10/31/2023     Priority: Medium    Bile salt-induced diarrhea 10/31/2023     Priority: Medium    Hypophosphatemia 10/30/2023     Priority: Medium    Hypoalbuminemia 10/30/2023     Priority: Medium    Anemia, unspecified type 10/30/2023     Priority: Medium    Elevated lactic acid level 10/30/2023     Priority: Medium    Abnormal chest CT-RLL opacity 10/30/2023     Priority: Medium    Abnormal abdominal CT scan-new extrahepatic 2 cm low attenuation area with fat stranding 10/30/2023     Priority: Medium    Inadequate oral nutritional intake 10/29/2023     Priority: Medium    Metastatic cancer to liver (H) 10/29/2023     Priority: Medium    History of bacteremia-Enterococcus Sept 2023 10/29/2023     Priority: Medium    S/P ERCP, sphincterotomy with stent placement, lap cholecystectomy 10/16-10/18/23 10/29/2023     Priority: Medium    Hypokalemia 10/28/2023     Priority: Medium    Gallstone pancreatitis 10/15/2023     Priority: Medium    Acute cholecystitis 10/07/2023     Priority: Medium    Hypothyroidism due to acquired atrophy of thyroid 03/09/2023     Priority: Medium    Malignant neoplasm metastatic to bone (H) 01/17/2023     Priority: Medium    Pancytopenia (H) 08/16/2022     Priority: Medium    Uncomplicated opioid dependence (H)      Priority: Medium    Chronic kidney disease, stage 3 (H) 06/15/2021     Priority: Medium    Failure to thrive (0-17) 06/02/2021     Priority: Medium     Replacing diagnoses that were inactivated after the 10/1/2021 regulatory import.      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     Priority: Medium    Hypomagnesemia 04/13/2021     Priority: Medium    Hiatal hernia 02/17/2020     Priority: Medium    Renal hematoma 10/28/2017     Priority: Medium    Retroperitoneal hematoma 10/28/2017     Priority: Medium    Syncope 10/18/2017     Priority: Medium    S/P ORIF (open reduction internal  fixation) fracture 08/03/2017     Priority: Medium    Closed Colles' fracture of right radius with routine healing, subsequent encounter 08/03/2017     Priority: Medium    Status post insertion of drug-eluting stent into left anterior descending artery for coronary artery disease 01/05/2017     Priority: Medium    Chest pain 12/24/2016     Priority: Medium    Arthropathy 02/04/2014     Priority: Medium     Problem list name updated by automated process. Provider to review      Coronary atherosclerosis      Priority: Medium     Coronary artery disease  Problem list name updated by automated process. Provider to review      Hallux rigidus 07/16/2013     Priority: Medium    Inguinal hernia 06/28/2013     Priority: Medium    Degenerative arthritis of foot 06/28/2013     Priority: Medium    Hypertension goal BP (blood pressure) < 140/90 06/12/2012     Priority: Medium    Hyperlipidemia LDL goal <130 12/22/2011     Priority: Medium    Anxiety 11/02/2011     Priority: Medium    Allergic conjunctivitis 07/08/2008     Priority: Medium    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, insomnia type 11/17/2006     Priority: Medium     Problem list name updated by automated process. Provider to review      Acute reaction to stress 01/12/2005     Priority: Medium     Problem list name updated by automated process. Provider to review      Chronic maxillary sinusitis 01/12/2005     Priority: Medium    Contracture of palmar fascia 01/12/2005     Priority: Medium    Benign neoplasm of skin of other and unspecified parts of face 01/12/2005     Priority: Medium    Malignant neoplasm of prostate (H) 11/26/2004     Priority: Medium        Past Medical History:    Past Medical History:   Diagnosis Date    Allergic rhinitis     Allergy, unspecified not elsewhere classified     Antiplatelet or antithrombotic long-term use     Anxiety     Arthritis     Chest pain     Chronic sinusitis     Coronary atherosclerosis of unspecified type of  vessel, native or graft     Depressive disorder 1995    Gastroesophageal reflux disease 2020    Head injury 1954    Hiatal hernia 2015    History of blood transfusion 12/15/2004    Hyperlipidemia     Hypertension     Inguinal hernia     Kidney problem 10/08/2017    Kidney stones     Malignant neoplasm of prostate (H)     Nausea with vomiting 11/03/2023    Prostate cancer (H)     Syncope     Thyroid disease     Tonsil cancer (H)        Past Surgical History:    Past Surgical History:   Procedure Laterality Date    ABDOMEN SURGERY      ARTHRODESIS FOOT  07/23/2013    Procedure: ARTHRODESIS FOOT;  Great Toe Arthrodesis Left Foot;  Surgeon: Ash Gonzalez DPM;  Location:  OR    ARTHRODESIS FOOT  06/10/2014    Procedure: ARTHRODESIS FOOT;  Surgeon: Ash Gonzalez DPM;  Location: [facility] OR    BIOPSY  10/01/2004    dermatologist biopsies    COLONOSCOPY  10/07/2013    Procedure: COLONOSCOPY;  Colonoscopy;  Surgeon: Mike Fallon MD;  Location:  GI    CYSTOSCOPY N/A 02/16/2022    Procedure: CYSTOSCOPY and bladder stone removal;  Surgeon: David Rogers MD;  Location:  OR    ENDOSCOPIC RETROGRADE CHOLANGIOPANCREATOGRAM N/A 1/15/2024    Procedure: ENDOSCOPIC RETROGRADE CHOLANGIOPANCREATOGRAPHY, WITH stent removal;  Surgeon: Trent Blood MD;  Location:  OR    ENDOSCOPIC RETROGRADE CHOLANGIOPANCREATOGRAM COMPLEX N/A 10/16/2023    Procedure: ENDOSCOPIC RETROGRADE CHOLANGIOPANCREATOGRAPHY, COMPLEX;  Surgeon: Trent Blood MD;  Location:  OR    ENDOSCOPIC ULTRASOUND UPPER GASTROINTESTINAL TRACT (GI) N/A 10/16/2023    Procedure: Endoscopic ultrasound upper gastrointestinal tract (GI);  Surgeon: Trent Blood MD;  Location:  OR    ENT SURGERY      ESOPHAGOSCOPY, GASTROSCOPY, DUODENOSCOPY (EGD), COMBINED N/A 02/12/2020    Procedure: ESOPHAGOGASTRODUODENOSCOPY (EGD);  Surgeon: Sam Escobar MD;  Location:  GI    EXTRACORPOREAL SHOCK WAVE LITHOTRIPSY (ESWL) Bilateral  10/18/2017    Procedure: EXTRACORPOREAL SHOCK WAVE LITHOTRIPSY (ESWL);  BILATERAL EXTRACORPOREAL SHOCKWAVE LITHOTRIPSY ;  Surgeon: Meir Torres MD;  Location: [facility] OR    HC CORRECT BUNION,SIMPLE  08/11/2005    x3    HC REMV TOE BENIGN BONE LESN  08/11/2005    HEAD & NECK SURGERY  1954    From a fall    HERNIORRHAPHY INGUINAL  07/03/2013    Procedure: HERNIORRHAPHY INGUINAL;  Open Repair Inguinal hernia Right with mesh ;  Surgeon: Sam Escobar MD;  Location: [facility] OR    IR GASTROSTOMY TUBE PERCUTANEOUS PLCMNT  06/07/2021    LAPAROSCOPIC CHOLECYSTECTOMY N/A 10/18/2023    Procedure: CHOLECYSTECTOMY, LAPAROSCOPIC;  Surgeon: Dannie Warner MD;  Location: [facility] OR    MOHS MICROGRAPHIC PROCEDURE  08/23/2011    ear and chin-[facility]    OPEN REDUCTION INTERNAL FIXATION WRIST Right 07/18/2017    Procedure: OPEN REDUCTION INTERNAL FIXATION WRIST;  Right distal radius open reduction and internal fixation;  Surgeon: Pedro Blanca DO;  Location: [facility] OR    RECONSTRUCT FOREFOOT WITH METATARSOPHALANGEAL (MTP) FUSION  06/10/2014    Procedure: RECONSTRUCT FOREFOOT WITH METATARSOPHALANGEAL (MTP) FUSION;  Surgeon: Ash Gonzalez DPM;  Location: [facility] OR    STENT, CORONARY, DEMI      SURGICAL HISTORY OF -   1999/1974    lt knee    SURGICAL HISTORY OF -   10/2004    lithotripsy    SURGICAL HISTORY OF -   11/2005    angiogram with stent    VASCULAR SURGERY  11/17/2005    Puncture of iliac artery during and angiogram    [facility] LAPAROSCOPY, SURGICAL PROSTATECTOMY, RETROPUBIC RADICAL, W/NERVE SPARING  11/30/2004    With full bilateral pelvic lymphadenectomy.  [facility].    [facility] TOTAL KNEE ARTHROPLASTY  05/01/2008    Left knee       Family History:    Family History   Problem Relation Age of Onset    Hypertension Father         Lived to age 87    Connective Tissue Disorder Mother         LUPUS    Heart Disease Mother         Valve replacement    Anxiety Disorder Mother         Lived to age 84    Dementia Mother          Nursing Home (lived to age 86)       Social History:  Marital Status:   [2]  Social History     Tobacco Use    Smoking status: Never    Smokeless tobacco: Never   Vaping Use    Vaping status: Never Used   Substance Use Topics    Alcohol use: Yes     Alcohol/week: 10.0 standard drinks of alcohol     Types: 10 Cans of beer per week     Comment: Moderate - 1 to two beers per day. ( None lately).    Drug use: No        Medications:    acetaminophen (TYLENOL) 325 MG tablet  apixaban ANTICOAGULANT (ELIQUIS) 5 MG tablet  budesonide (ENTOCORT EC) 3 MG EC capsule  camphor-menthol (DERMASARRA) 0.5-0.5 % external lotion  cetirizine (ZYRTEC) 10 MG tablet  DULoxetine (CYMBALTA) 60 MG capsule  enoxaparin ANTICOAGULANT (LOVENOX) 100 MG/ML syringe  famotidine (PEPCID) 20 MG tablet  fluconazole (DIFLUCAN) 200 MG tablet  hydrOXYzine HCl (ATARAX) 25 MG tablet  levothyroxine (SYNTHROID/LEVOTHROID) 100 MCG tablet  linezolid (ZYVOX) 600 MG tablet  LORazepam (ATIVAN) 0.5 MG tablet  melatonin 5 MG tablet  naloxone (NARCAN) 4 MG/0.1ML nasal spray  nitroGLYcerin (NITROSTAT) 0.4 MG sublingual tablet  ondansetron (ZOFRAN ODT) 4 MG ODT tab  oxyCODONE (ROXICODONE) 5 MG tablet  predniSONE (DELTASONE) 20 MG tablet  pregabalin (LYRICA) 150 MG capsule  rosuvastatin (CRESTOR) 40 MG tablet  zolpidem (AMBIEN) 10 MG tablet          Review of Systems   All other systems reviewed and are negative.        Physical Exam   BP: 107/86  Pulse: 102  Temp: 97.4  F (36.3  C)  Resp: 18  SpO2: 100 %      Physical Exam  Vitals and nursing note reviewed.   Constitutional:       General: He is sleeping. He is not in acute distress.     Appearance: He is cachectic. He is not ill-appearing, toxic-appearing or diaphoretic.      Comments: Frail, sleeping but arousable and answers questions appropriately   HENT:      Head: Normocephalic and atraumatic.      Mouth/Throat:      Mouth: Mucous membranes are dry.   Eyes:      Conjunctiva/sclera: Conjunctivae normal.       Pupils: Pupils are equal, round, and reactive to light.   Cardiovascular:      Rate and Rhythm: Normal rate and regular rhythm.      Heart sounds: Normal heart sounds.   Pulmonary:      Effort: Pulmonary effort is normal. No respiratory distress.      Breath sounds: Normal breath sounds.   Abdominal:      General: Bowel sounds are normal. There is no distension.      Palpations: Abdomen is soft.      Tenderness: There is no abdominal tenderness.   Musculoskeletal:         General: No deformity.      Cervical back: Neck supple.   Skin:     General: Skin is warm and dry.   Neurological:      General: No focal deficit present.      Coordination: Coordination normal.   Psychiatric:         Behavior: Behavior is cooperative.           ED Course        Procedures      No results found. However, due to the size of the patient record, not all encounters were searched. Please check Results Review for a complete set of results.    Medications   sodium chloride 0.9% BOLUS 1,000 mL (0 mLs Intravenous Stopped 4/26/24 1340)   HYDROmorphone (DILAUDID) injection 1 mg (1 mg Intravenous $Given 4/26/24 1246)   sodium chloride 0.9% BOLUS 1,000 mL (0 mLs Intravenous Stopped 4/26/24 1432)         Assessments & Plan (with Medical Decision Making)  Quan Murphy is a 73 year old male who presented to the ED with his wife requesting IV fluids.  Patient has been on hospice for couple weeks now.  Wife acknowledges reports he is comfort cares at this time but patient was adamant and coming here for IV fluids.  Reports pain is a 9/10 but no other acute concerns.  On arrival he was afebrile with unremarkable vital signs.  Patient sleepy but arousable.  Mucous membranes dry.  No acute abnormalities on exam today.  Patient administered 2 L IV fluids here along with 1 mg of Dilaudid for acute pain relief.  Remained hemodynamically stable throughout ED stay.  No further interventions or workup completed at this time since he is comfort cares  at this time, focused on making him comfortable.  Patient will be discharged back home in stable condition.     I have reviewed the nursing notes.    I have reviewed the findings, diagnosis, plan and need for follow up with the patient.      Discharge Medication List as of 4/26/2024  2:43 PM          Final diagnoses:   Dehydration     Note: Chart documentation done in part with Dragon Voice Recognition software. Although reviewed after completion, some word and grammatical errors may remain.     4/26/2024   [facility] EMERGENCY DEPT       Joelle Chawla PA-C  04/26/24 [id number]

## 2025-06-30 NOTE — TELEPHONE ENCOUNTER
"Requested Prescriptions   Pending Prescriptions Disp Refills    cetirizine (ZYRTEC) 10 MG tablet       Sig: Take 1 tablet (10 mg) by mouth daily       Antihistamines Protocol Failed - 6/20/2023 12:16 PM        Failed - Patient is 3-64 years of age     Apply weight-based dosing for peds patients age 3 - 12 years of age.    Forward request to provider for patients under the age of 3 or over the age of 64.            Passed - Recent (12 mo) or future (30 days) visit within the authorizing provider's specialty     Patient has had an office visit with the authorizing provider or a provider within the authorizing providers department within the previous 12 mos or has a future within next 30 days. See \"Patient Info\" tab in inbasket, or \"Choose Columns\" in Meds & Orders section of the refill encounter.              Passed - Medication is active on med list             " Pt left ER in stable condition and a&o x4. Pt left carried by mother and left via personal vehicle. Pt and mother had no further questions following discharge teaching.

## (undated) DEVICE — ESU CORD BIPOLAR GREEN 10-4000

## (undated) DEVICE — PREP POVIDONE IODINE SCRUB 7.5% 120ML

## (undated) DEVICE — DEVICE SUTURE PASSER 14GA WECK EFX EFXSP2

## (undated) DEVICE — ENDO SCOPE WARMER LF TM500

## (undated) DEVICE — TUBING SUCTION 12"X1/4" N612

## (undated) DEVICE — DRAPE STERI TOWEL SM 1000

## (undated) DEVICE — CONTRAST OMNIPAQUE 240 (50ML) Y-520 (CHARGE BY ML)

## (undated) DEVICE — ENDO BITE BLOCK 60 MAXI LF 00712804

## (undated) DEVICE — TUBING SUCTION 10'X3/16" N510

## (undated) DEVICE — LINEN TOWEL PACK X5 5464

## (undated) DEVICE — RAD RX ISOVUE 300 (50ML) 61% IOPAMIDOL CHARGE PER ML

## (undated) DEVICE — SU VICRYL 4-0 PS-2 18" UND J496H

## (undated) DEVICE — DRAPE GYN/UROLOGY FLUID POUCH TUR 29455

## (undated) DEVICE — ESU GROUND PAD ADULT W/CORD E7507

## (undated) DEVICE — GLOVE PROTEXIS MICRO 6.0  2D73PM60

## (undated) DEVICE — PACK BASIC SET-UP 9101

## (undated) DEVICE — NDL CANNULA INTERLINK BLUNT 18GA

## (undated) DEVICE — EVAC SYSTEM CLEAR FLOW SC082500

## (undated) DEVICE — ESU HOLSTER PLASTIC DISP E2400

## (undated) DEVICE — CAST PLASTER SPLINT 4X15" EXTRA FAST

## (undated) DEVICE — SOL WATER INJ 2000ML BAG 07118-07

## (undated) DEVICE — SU VICRYL 2-0 CP-2 18" UND J762D

## (undated) DEVICE — GOWN XLG DISP 9545

## (undated) DEVICE — SUCTION MANIFOLD NEPTUNE 2 SYS 4 PORT 0702-020-000

## (undated) DEVICE — ESU PENCIL SMOKE EVAC W/ROCKER SWITCH 0703-047-000

## (undated) DEVICE — NDL INSUFFLATION 120MM VERRES 172015

## (undated) DEVICE — DRSG GAUZE 4X4" 3033

## (undated) DEVICE — CLIP APPLIER ENDO ROTATING 10MM MED/LG ER320

## (undated) DEVICE — ENDO CATH BALLOON EXTRACTOR PRO RX 09-12MM 4700

## (undated) DEVICE — GLOVE PROTEXIS W/NEU-THERA 8.5  2D73TE85

## (undated) DEVICE — GLOVE PROTEXIS BLUE W/NEU-THERA 8.0  2D73EB80

## (undated) DEVICE — GLOVE PROTEXIS W/NEU-THERA 7.5  2D73TE75

## (undated) DEVICE — LIGHT HANDLE X1 31140133

## (undated) DEVICE — SUCTION IRR STRYKERFLOW II W/TIP 250-070-520

## (undated) DEVICE — SOL NACL 0.9% IRRIG 1000ML BOTTLE 07138-09

## (undated) DEVICE — BNDG ESMARK 4" STERILE

## (undated) DEVICE — GLOVE PROTEXIS W/NEU-THERA 6.5  2D73TE65

## (undated) DEVICE — SOL WATER IRRIG 1000ML BOTTLE 2F7114

## (undated) DEVICE — DECANTER BAG 2002S

## (undated) DEVICE — GLOVE GAMMEX DERMAPRENE ULTRA SZ 8.5 LF 8517

## (undated) DEVICE — PEN MARKING SKIN

## (undated) DEVICE — ESU ELEC BLADE 2.75" COATED/INSULATED E1455

## (undated) DEVICE — PREP SKIN SCRUB TRAY 4461A

## (undated) DEVICE — BNDG ELASTIC 3"X5YDS UNSTERILE 6611-30

## (undated) DEVICE — ENDO POUCH UNIV RETRIEVAL SYSTEM INZII 10MM CD001

## (undated) DEVICE — GLOVE PROTEXIS BLUE W/NEU-THERA 6.5  2D73EB65

## (undated) DEVICE — ESU GROUND PAD UNIVERSAL W/O CORD

## (undated) DEVICE — ENDO TROCAR FIRST ENTRY KII FIOS Z-THRD 11X100MM CTF33

## (undated) DEVICE — LABEL YELLOW TIME OUT CUSTOM SBA15TOFHB

## (undated) DEVICE — ENDO TROCAR FIRST ENTRY KII FIOS Z-THRD 05X100MM CTF03

## (undated) DEVICE — SU ETHILON 3-0 PS-2 18" 1669H

## (undated) DEVICE — ENDO TROCAR SLEEVE KII Z-THREADED 05X100MM CTS02

## (undated) DEVICE — IMPLANTABLE DEVICE
Type: IMPLANTABLE DEVICE | Site: WRIST | Status: NON-FUNCTIONAL
Removed: 2017-07-18

## (undated) DEVICE — SOL NACL 0.9% IRRIG 1000ML BOTTLE 2F7124

## (undated) DEVICE — INTR ENDOSCOPIC STENT FUSION OASIS 09.0FRX200CM

## (undated) DEVICE — PREP CHLORAPREP 26ML TINTED ORANGE  260815

## (undated) DEVICE — BASIN SET MINOR DISP

## (undated) DEVICE — TUBING CYSTO/BLADDER IRRIG SET 80" 06544-01

## (undated) DEVICE — Device

## (undated) DEVICE — PACK HAND WRIST FOREARM CUSTOM

## (undated) DEVICE — DRAPE SHEET REV FOLD 3/4 9349

## (undated) DEVICE — LABEL MEDICATION SYSTEM  3304

## (undated) DEVICE — DRSG GAUZE 4X4" TRAY

## (undated) DEVICE — SU VICRYL 0 UR-6 27" J603H

## (undated) DEVICE — PACK LAP CHOLE SLC15LCFSD

## (undated) DEVICE — LINEN GOWN LG 5406

## (undated) DEVICE — CAST PADDING 4" WEBRIL STERILE

## (undated) DEVICE — PREP CHLORAPREP 26ML TINTED HI-LITE ORANGE 930815

## (undated) DEVICE — SYR BULB IRRIG 50ML LATEX FREE 0035280

## (undated) DEVICE — LUBRICATING JELLY 4.25OZ

## (undated) DEVICE — SOL WATER IRRIG 1000ML BOTTLE 07139-09

## (undated) DEVICE — ESU HOLDER LAP INST DISP PURPLE LONG 330MM H-PRO-330

## (undated) DEVICE — SOL NACL 0.9% INJ 1000ML BAG 2B1324X

## (undated) RX ORDER — SODIUM CHLORIDE 9 MG/ML
INJECTION, SOLUTION INTRAVENOUS
Status: DISPENSED
Start: 2022-09-14

## (undated) RX ORDER — ONDANSETRON 2 MG/ML
INJECTION INTRAMUSCULAR; INTRAVENOUS
Status: DISPENSED
Start: 2023-01-01

## (undated) RX ORDER — LIDOCAINE HYDROCHLORIDE 10 MG/ML
INJECTION, SOLUTION EPIDURAL; INFILTRATION; INTRACAUDAL; PERINEURAL
Status: DISPENSED
Start: 2017-07-18

## (undated) RX ORDER — DEXAMETHASONE SODIUM PHOSPHATE 10 MG/ML
INJECTION INTRAMUSCULAR; INTRAVENOUS
Status: DISPENSED
Start: 2017-07-18

## (undated) RX ORDER — BUPIVACAINE HYDROCHLORIDE 5 MG/ML
INJECTION, SOLUTION EPIDURAL; INTRACAUDAL
Status: DISPENSED
Start: 2022-09-14

## (undated) RX ORDER — DEXAMETHASONE SODIUM PHOSPHATE 4 MG/ML
INJECTION, SOLUTION INTRA-ARTICULAR; INTRALESIONAL; INTRAMUSCULAR; INTRAVENOUS; SOFT TISSUE
Status: DISPENSED
Start: 2017-10-18

## (undated) RX ORDER — PROPOFOL 10 MG/ML
INJECTION, EMULSION INTRAVENOUS
Status: DISPENSED
Start: 2023-01-01

## (undated) RX ORDER — BUPIVACAINE HYDROCHLORIDE 5 MG/ML
INJECTION, SOLUTION EPIDURAL; INTRACAUDAL
Status: DISPENSED
Start: 2017-07-18

## (undated) RX ORDER — LIDOCAINE HYDROCHLORIDE 20 MG/ML
JELLY TOPICAL
Status: DISPENSED
Start: 2021-06-07

## (undated) RX ORDER — CEFAZOLIN SODIUM 2 G/100ML
INJECTION, SOLUTION INTRAVENOUS
Status: DISPENSED
Start: 2021-06-07

## (undated) RX ORDER — FENTANYL CITRATE 50 UG/ML
INJECTION, SOLUTION INTRAMUSCULAR; INTRAVENOUS
Status: DISPENSED
Start: 2023-01-01

## (undated) RX ORDER — PIPERACILLIN SODIUM, TAZOBACTAM SODIUM 3; .375 G/15ML; G/15ML
INJECTION, POWDER, LYOPHILIZED, FOR SOLUTION INTRAVENOUS
Status: DISPENSED
Start: 2023-01-01

## (undated) RX ORDER — PROPOFOL 10 MG/ML
INJECTION, EMULSION INTRAVENOUS
Status: DISPENSED
Start: 2017-07-18

## (undated) RX ORDER — PROPOFOL 10 MG/ML
INJECTION, EMULSION INTRAVENOUS
Status: DISPENSED
Start: 2017-10-18

## (undated) RX ORDER — ONDANSETRON 2 MG/ML
INJECTION INTRAMUSCULAR; INTRAVENOUS
Status: DISPENSED
Start: 2017-10-18

## (undated) RX ORDER — EPHEDRINE SULFATE 50 MG/ML
INJECTION, SOLUTION INTRAMUSCULAR; INTRAVENOUS; SUBCUTANEOUS
Status: DISPENSED
Start: 2023-01-01

## (undated) RX ORDER — NALOXONE HYDROCHLORIDE 0.4 MG/ML
INJECTION, SOLUTION INTRAMUSCULAR; INTRAVENOUS; SUBCUTANEOUS
Status: DISPENSED
Start: 2022-02-16

## (undated) RX ORDER — EPHEDRINE SULFATE 50 MG/ML
INJECTION, SOLUTION INTRAMUSCULAR; INTRAVENOUS; SUBCUTANEOUS
Status: DISPENSED
Start: 2022-02-16

## (undated) RX ORDER — LIDOCAINE HYDROCHLORIDE 20 MG/ML
INJECTION, SOLUTION EPIDURAL; INFILTRATION; INTRACAUDAL; PERINEURAL
Status: DISPENSED
Start: 2020-02-12

## (undated) RX ORDER — LIDOCAINE HYDROCHLORIDE 10 MG/ML
INJECTION, SOLUTION EPIDURAL; INFILTRATION; INTRACAUDAL; PERINEURAL
Status: DISPENSED
Start: 2021-06-07

## (undated) RX ORDER — BUPIVACAINE HYDROCHLORIDE AND EPINEPHRINE 2.5; 5 MG/ML; UG/ML
INJECTION, SOLUTION EPIDURAL; INFILTRATION; INTRACAUDAL; PERINEURAL
Status: DISPENSED
Start: 2023-01-01

## (undated) RX ORDER — DEXAMETHASONE SODIUM PHOSPHATE 4 MG/ML
INJECTION, SOLUTION INTRA-ARTICULAR; INTRALESIONAL; INTRAMUSCULAR; INTRAVENOUS; SOFT TISSUE
Status: DISPENSED
Start: 2023-01-01

## (undated) RX ORDER — HYDROMORPHONE HYDROCHLORIDE 1 MG/ML
INJECTION, SOLUTION INTRAMUSCULAR; INTRAVENOUS; SUBCUTANEOUS
Status: DISPENSED
Start: 2023-01-01

## (undated) RX ORDER — FENTANYL CITRATE 50 UG/ML
INJECTION, SOLUTION INTRAMUSCULAR; INTRAVENOUS
Status: DISPENSED
Start: 2021-06-07

## (undated) RX ORDER — KETAMINE HCL IN 0.9 % NACL 20 MG/2 ML
SYRINGE (ML) INTRAVENOUS
Status: DISPENSED
Start: 2017-10-18

## (undated) RX ORDER — ACETAMINOPHEN 325 MG/1
TABLET ORAL
Status: DISPENSED
Start: 2022-09-06

## (undated) RX ORDER — PROPOFOL 10 MG/ML
INJECTION, EMULSION INTRAVENOUS
Status: DISPENSED
Start: 2024-01-01

## (undated) RX ORDER — PHENYLEPHRINE HCL IN 0.9% NACL 1 MG/10 ML
SYRINGE (ML) INTRAVENOUS
Status: DISPENSED
Start: 2017-07-18

## (undated) RX ORDER — ONDANSETRON 2 MG/ML
INJECTION INTRAMUSCULAR; INTRAVENOUS
Status: DISPENSED
Start: 2017-07-18

## (undated) RX ORDER — OXYCODONE HYDROCHLORIDE 5 MG/1
TABLET ORAL
Status: DISPENSED
Start: 2017-10-18

## (undated) RX ORDER — LIDOCAINE HYDROCHLORIDE 20 MG/ML
INJECTION, SOLUTION EPIDURAL; INFILTRATION; INTRACAUDAL; PERINEURAL
Status: DISPENSED
Start: 2017-10-18

## (undated) RX ORDER — HYDROMORPHONE HYDROCHLORIDE 1 MG/ML
INJECTION, SOLUTION INTRAMUSCULAR; INTRAVENOUS; SUBCUTANEOUS
Status: DISPENSED
Start: 2017-10-18

## (undated) RX ORDER — PROPOFOL 10 MG/ML
INJECTION, EMULSION INTRAVENOUS
Status: DISPENSED
Start: 2020-02-12

## (undated) RX ORDER — LIDOCAINE HYDROCHLORIDE 20 MG/ML
INJECTION, SOLUTION EPIDURAL; INFILTRATION; INTRACAUDAL; PERINEURAL
Status: DISPENSED
Start: 2017-07-18

## (undated) RX ORDER — METOCLOPRAMIDE HYDROCHLORIDE 5 MG/ML
INJECTION INTRAMUSCULAR; INTRAVENOUS
Status: DISPENSED
Start: 2017-10-18

## (undated) RX ORDER — AMPICILLIN AND SULBACTAM 2; 1 G/1; G/1
INJECTION, POWDER, FOR SOLUTION INTRAMUSCULAR; INTRAVENOUS
Status: DISPENSED
Start: 2022-09-06

## (undated) RX ORDER — LIDOCAINE HYDROCHLORIDE 10 MG/ML
INJECTION, SOLUTION EPIDURAL; INFILTRATION; INTRACAUDAL; PERINEURAL
Status: DISPENSED
Start: 2022-09-14

## (undated) RX ORDER — FENTANYL CITRATE 0.05 MG/ML
INJECTION, SOLUTION INTRAMUSCULAR; INTRAVENOUS
Status: DISPENSED
Start: 2023-01-01

## (undated) RX ORDER — METOPROLOL SUCCINATE 25 MG/1
TABLET, EXTENDED RELEASE ORAL
Status: DISPENSED
Start: 2023-01-01

## (undated) RX ORDER — DEXAMETHASONE SODIUM PHOSPHATE 4 MG/ML
INJECTION, SOLUTION INTRA-ARTICULAR; INTRALESIONAL; INTRAMUSCULAR; INTRAVENOUS; SOFT TISSUE
Status: DISPENSED
Start: 2024-01-01

## (undated) RX ORDER — FENTANYL CITRATE 50 UG/ML
INJECTION, SOLUTION INTRAMUSCULAR; INTRAVENOUS
Status: DISPENSED
Start: 2022-02-16

## (undated) RX ORDER — FENTANYL CITRATE 50 UG/ML
INJECTION, SOLUTION INTRAMUSCULAR; INTRAVENOUS
Status: DISPENSED
Start: 2017-10-18

## (undated) RX ORDER — FENTANYL CITRATE 50 UG/ML
INJECTION, SOLUTION INTRAMUSCULAR; INTRAVENOUS
Status: DISPENSED
Start: 2022-09-14

## (undated) RX ORDER — ONDANSETRON 2 MG/ML
INJECTION INTRAMUSCULAR; INTRAVENOUS
Status: DISPENSED
Start: 2024-01-01

## (undated) RX ORDER — DEXAMETHASONE SODIUM PHOSPHATE 10 MG/ML
INJECTION INTRAMUSCULAR; INTRAVENOUS
Status: DISPENSED
Start: 2022-09-06

## (undated) RX ORDER — HYDROXYZINE HYDROCHLORIDE 50 MG/ML
INJECTION, SOLUTION INTRAMUSCULAR
Status: DISPENSED
Start: 2017-10-18

## (undated) RX ORDER — FENTANYL CITRATE 50 UG/ML
INJECTION, SOLUTION INTRAMUSCULAR; INTRAVENOUS
Status: DISPENSED
Start: 2024-01-01

## (undated) RX ORDER — FENTANYL CITRATE 50 UG/ML
INJECTION, SOLUTION INTRAMUSCULAR; INTRAVENOUS
Status: DISPENSED
Start: 2017-07-18

## (undated) RX ORDER — ONDANSETRON 2 MG/ML
INJECTION INTRAMUSCULAR; INTRAVENOUS
Status: DISPENSED
Start: 2021-06-07